# Patient Record
Sex: FEMALE | Race: WHITE | Employment: UNEMPLOYED | URBAN - NONMETROPOLITAN AREA
[De-identification: names, ages, dates, MRNs, and addresses within clinical notes are randomized per-mention and may not be internally consistent; named-entity substitution may affect disease eponyms.]

---
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
## 2021-06-18 ENCOUNTER — HOSPITAL ENCOUNTER (EMERGENCY)
Age: 21
Discharge: ANOTHER HOSPITAL | End: 2021-06-19
Attending: EMERGENCY MEDICINE
Payer: COMMERCIAL

## 2021-06-18 VITALS
RESPIRATION RATE: 18 BRPM | HEIGHT: 64 IN | HEART RATE: 78 BPM | BODY MASS INDEX: 46.95 KG/M2 | TEMPERATURE: 98.8 F | OXYGEN SATURATION: 96 % | DIASTOLIC BLOOD PRESSURE: 88 MMHG | SYSTOLIC BLOOD PRESSURE: 138 MMHG | WEIGHT: 275 LBS

## 2021-06-18 DIAGNOSIS — F32.A DEPRESSION WITH SUICIDAL IDEATION: Primary | ICD-10-CM

## 2021-06-18 DIAGNOSIS — R45.851 DEPRESSION WITH SUICIDAL IDEATION: Primary | ICD-10-CM

## 2021-06-18 LAB
-: ABNORMAL
-: NORMAL
ABSOLUTE EOS #: 0.07 K/UL (ref 0–0.44)
ABSOLUTE IMMATURE GRANULOCYTE: 0.03 K/UL (ref 0–0.3)
ABSOLUTE LYMPH #: 2.2 K/UL (ref 1.1–3.7)
ABSOLUTE MONO #: 0.76 K/UL (ref 0.1–1.2)
ACETAMINOPHEN LEVEL: <5 UG/ML (ref 10–30)
ALBUMIN SERPL-MCNC: 4.5 G/DL (ref 3.5–5.2)
ALBUMIN/GLOBULIN RATIO: 1.6 (ref 1–2.5)
ALP BLD-CCNC: 72 U/L (ref 35–104)
ALT SERPL-CCNC: 71 U/L (ref 5–33)
AMORPHOUS: ABNORMAL
AMPHETAMINE SCREEN URINE: NEGATIVE
ANION GAP SERPL CALCULATED.3IONS-SCNC: 10 MMOL/L (ref 9–17)
AST SERPL-CCNC: 35 U/L
BACTERIA: ABNORMAL
BARBITURATE SCREEN URINE: NEGATIVE
BASOPHILS # BLD: 0 % (ref 0–2)
BASOPHILS ABSOLUTE: 0.05 K/UL (ref 0–0.2)
BENZODIAZEPINE SCREEN, URINE: NEGATIVE
BILIRUB SERPL-MCNC: 0.34 MG/DL (ref 0.3–1.2)
BILIRUBIN URINE: NEGATIVE
BUN BLDV-MCNC: 9 MG/DL (ref 6–20)
BUN/CREAT BLD: 14 (ref 9–20)
BUPRENORPHINE URINE: NEGATIVE
CALCIUM SERPL-MCNC: 10.3 MG/DL (ref 8.6–10.4)
CANNABINOID SCREEN URINE: POSITIVE
CASTS UA: ABNORMAL /LPF
CHLORIDE BLD-SCNC: 103 MMOL/L (ref 98–107)
CO2: 23 MMOL/L (ref 20–31)
COCAINE METABOLITE, URINE: NEGATIVE
COLOR: YELLOW
COMMENT UA: ABNORMAL
CREAT SERPL-MCNC: 0.65 MG/DL (ref 0.5–0.9)
CRYSTALS, UA: ABNORMAL /HPF
DIFFERENTIAL TYPE: ABNORMAL
EOSINOPHILS RELATIVE PERCENT: 1 % (ref 1–4)
EPITHELIAL CELLS UA: ABNORMAL /HPF (ref 0–25)
ETHANOL PERCENT: <0.01 %
ETHANOL: <10 MG/DL
GFR AFRICAN AMERICAN: >60 ML/MIN
GFR NON-AFRICAN AMERICAN: >60 ML/MIN
GFR SERPL CREATININE-BSD FRML MDRD: ABNORMAL ML/MIN/{1.73_M2}
GFR SERPL CREATININE-BSD FRML MDRD: ABNORMAL ML/MIN/{1.73_M2}
GLUCOSE BLD-MCNC: 84 MG/DL (ref 70–99)
GLUCOSE URINE: NEGATIVE
HCG QUALITATIVE: NEGATIVE
HCT VFR BLD CALC: 45.2 % (ref 36.3–47.1)
HEMOGLOBIN: 15 G/DL (ref 11.9–15.1)
IMMATURE GRANULOCYTES: 0 %
KETONES, URINE: NEGATIVE
LEUKOCYTE ESTERASE, URINE: NEGATIVE
LYMPHOCYTES # BLD: 18 % (ref 24–43)
MCH RBC QN AUTO: 28.6 PG (ref 25.2–33.5)
MCHC RBC AUTO-ENTMCNC: 33.2 G/DL (ref 28.4–34.8)
MCV RBC AUTO: 86.3 FL (ref 82.6–102.9)
MDMA URINE: ABNORMAL
METHADONE SCREEN, URINE: NEGATIVE
METHAMPHETAMINE, URINE: NEGATIVE
MONOCYTES # BLD: 6 % (ref 3–12)
MUCUS: ABNORMAL
NITRITE, URINE: NEGATIVE
NRBC AUTOMATED: 0 PER 100 WBC
OPIATES, URINE: NEGATIVE
OTHER OBSERVATIONS UA: ABNORMAL
OXYCODONE SCREEN URINE: NEGATIVE
PDW BLD-RTO: 13 % (ref 11.8–14.4)
PH UA: 5.5 (ref 5–9)
PHENCYCLIDINE, URINE: NEGATIVE
PLATELET # BLD: 296 K/UL (ref 138–453)
PLATELET ESTIMATE: ABNORMAL
PMV BLD AUTO: 9.1 FL (ref 8.1–13.5)
POTASSIUM SERPL-SCNC: 4.2 MMOL/L (ref 3.7–5.3)
PROPOXYPHENE, URINE: NEGATIVE
PROTEIN UA: NEGATIVE
RBC # BLD: 5.24 M/UL (ref 3.95–5.11)
RBC # BLD: ABNORMAL 10*6/UL
RBC UA: ABNORMAL /HPF (ref 0–2)
REASON FOR REJECTION: NORMAL
RENAL EPITHELIAL, UA: ABNORMAL /HPF
SALICYLATE LEVEL: <1 MG/DL (ref 3–10)
SARS-COV-2, RAPID: NOT DETECTED
SEG NEUTROPHILS: 75 % (ref 36–65)
SEGMENTED NEUTROPHILS ABSOLUTE COUNT: 9.07 K/UL (ref 1.5–8.1)
SODIUM BLD-SCNC: 136 MMOL/L (ref 135–144)
SPECIFIC GRAVITY UA: 1.02 (ref 1.01–1.02)
SPECIMEN DESCRIPTION: NORMAL
TEST INFORMATION: ABNORMAL
TOTAL PROTEIN: 7.4 G/DL (ref 6.4–8.3)
TRICHOMONAS: ABNORMAL
TRICYCLIC ANTIDEPRESSANTS, UR: NEGATIVE
TURBIDITY: CLEAR
URINE HGB: NEGATIVE
UROBILINOGEN, URINE: NORMAL
WBC # BLD: 12.2 K/UL (ref 3.5–11.3)
WBC # BLD: ABNORMAL 10*3/UL
WBC UA: ABNORMAL /HPF (ref 0–5)
YEAST: ABNORMAL
ZZ NTE CLEAN UP: ORDERED TEST: NORMAL
ZZ NTE WITH NAME CLEAN UP: SPECIMEN SOURCE: NORMAL

## 2021-06-18 PROCEDURE — 87635 SARS-COV-2 COVID-19 AMP PRB: CPT

## 2021-06-18 PROCEDURE — 80053 COMPREHEN METABOLIC PANEL: CPT

## 2021-06-18 PROCEDURE — 93005 ELECTROCARDIOGRAM TRACING: CPT | Performed by: EMERGENCY MEDICINE

## 2021-06-18 PROCEDURE — 80143 DRUG ASSAY ACETAMINOPHEN: CPT

## 2021-06-18 PROCEDURE — G0480 DRUG TEST DEF 1-7 CLASSES: HCPCS

## 2021-06-18 PROCEDURE — 81001 URINALYSIS AUTO W/SCOPE: CPT

## 2021-06-18 PROCEDURE — C9803 HOPD COVID-19 SPEC COLLECT: HCPCS

## 2021-06-18 PROCEDURE — 80179 DRUG ASSAY SALICYLATE: CPT

## 2021-06-18 PROCEDURE — 84703 CHORIONIC GONADOTROPIN ASSAY: CPT

## 2021-06-18 PROCEDURE — 85025 COMPLETE CBC W/AUTO DIFF WBC: CPT

## 2021-06-18 PROCEDURE — 99284 EMERGENCY DEPT VISIT MOD MDM: CPT

## 2021-06-18 PROCEDURE — 80306 DRUG TEST PRSMV INSTRMNT: CPT

## 2021-06-18 RX ORDER — ZIPRASIDONE HYDROCHLORIDE 80 MG/1
80 CAPSULE ORAL NIGHTLY
Status: ON HOLD | COMMUNITY
End: 2021-09-17 | Stop reason: HOSPADM

## 2021-06-18 RX ORDER — PRAZOSIN HYDROCHLORIDE 1 MG/1
3 CAPSULE ORAL NIGHTLY
Status: ON HOLD | COMMUNITY
End: 2021-09-17 | Stop reason: HOSPADM

## 2021-06-18 ASSESSMENT — ENCOUNTER SYMPTOMS
ABDOMINAL PAIN: 0
COUGH: 0
SHORTNESS OF BREATH: 0
COLOR CHANGE: 0
NAUSEA: 0
SORE THROAT: 0
DIARRHEA: 0
VOMITING: 0

## 2021-06-18 ASSESSMENT — PATIENT HEALTH QUESTIONNAIRE - PHQ9: SUM OF ALL RESPONSES TO PHQ QUESTIONS 1-9: 21

## 2021-06-18 NOTE — ED NOTES
Called [facility] for transfer to [facility], waiting for call back. Trying [facility] but is discharge dependent, going to try other facilities also.       Shira Gains  06/18/21 3231

## 2021-06-18 NOTE — ED PROVIDER NOTES
[facility] ED  eMERGENCY dEPARTMENT eNCOUnter      Pt Name: Beronica Carreno  MRN: [id number]  [place] 2000  Date of evaluation: 6/18/2021  Provider: Abisai Fish DO     CHIEF COMPLAINT       Chief Complaint   Patient presents with    Suicidal     with a plan of overdosing on zoloft         HISTORY OF PRESENT ILLNESS   (Location/Symptom, Timing/Onset, Context/Setting, Quality, Duration, Modifying Factors, Severity) Note limiting factors. HPI    Beronica Carreno is a 24 y.o. female who presents to the emergency department with suicidal ideation. Patient is a 67-year-old female with past medical history of depression. She states that she has had 3 previous suicide attempts by overdose. She reports that over the past 2 weeks her depression has been increasing and her thoughts of suicide are worsened. She states that she is to the point where worsening depression has caused her to contemplate suicide and she states that she has a plan where she will overdose on her medications. She recognizes that this is not normal and is concerned that she will hurt her self if she stays at home and therefore comes to the hospital for evaluation. She denies any alcohol but does states she smokes marijuana daily    Nursing Notes were reviewed. REVIEW OF SYSTEMS    (2+ for level 4; 10+ for level 5)   Review of Systems   Constitutional: Negative for chills and fever. HENT: Negative for congestion and sore throat. Respiratory: Negative for cough and shortness of breath. Cardiovascular: Negative for chest pain. Gastrointestinal: Negative for abdominal pain, diarrhea, nausea and vomiting. Genitourinary: Negative for dysuria. Musculoskeletal: Negative for myalgias. Skin: Negative for color change and rash. Neurological: Negative for dizziness, light-headedness and headaches. Psychiatric/Behavioral: Positive for suicidal ideas. Negative for self-injury.    All other systems Insecurity:     Worried About Running Out of Food in the Last Year:     [address] in the Last Year:    Transportation Needs:     Lack of Transportation (Medical):  Lack of Transportation (Non-Medical):    Physical Activity:     Days of Exercise per Week:     Minutes of Exercise per Session:    Stress:     Feeling of Stress :    Social Connections:     Frequency of Communication with Friends and Family:     Frequency of Social Gatherings with Friends and Family:     Attends Buddhist Services:     Active Member of Clubs or Organizations:     Attends Club or Organization Meetings:     Marital Status:    Intimate Partner Violence:     Fear of Current or Ex-Partner:     Emotionally Abused:     Physically Abused:     Sexually Abused:        SCREENINGS           PHYSICAL EXAM    (up to 7 for level 4, 8 or more for level 5)   @[email or web address]    Physical Exam  Vitals and nursing note reviewed. Constitutional:       General: She is not in acute distress. Appearance: Normal appearance. She is not ill-appearing, toxic-appearing or diaphoretic. HENT:      Head: Normocephalic and atraumatic. Eyes:      General: No scleral icterus. Right eye: No discharge. Left eye: No discharge. Extraocular Movements: Extraocular movements intact. Conjunctiva/sclera: Conjunctivae normal.      Pupils: Pupils are equal, round, and reactive to light. Cardiovascular:      Rate and Rhythm: Normal rate and regular rhythm. Pulses: Normal pulses. Heart sounds: Normal heart sounds. No murmur heard. No friction rub. No gallop. Pulmonary:      Effort: Pulmonary effort is normal. No respiratory distress. Breath sounds: Normal breath sounds. No wheezing, rhonchi or rales. Musculoskeletal:         General: No swelling, tenderness, deformity or signs of injury. Normal range of motion. Cervical back: Normal range of motion and neck supple. No rigidity or tenderness.    Skin: General: Skin is warm and dry. Capillary Refill: Capillary refill takes less than 2 seconds. Findings: No erythema or rash. Neurological:      General: No focal deficit present. Mental Status: She is alert and oriented to person, place, and time. Cranial Nerves: No cranial nerve deficit. Sensory: No sensory deficit. Psychiatric:      Comments: Depressed affect with suicidal ideation         DIAGNOSTIC RESULTS     EKG (Per Emergency Physician):   EKG shows normal sinus rhythm at a rate of 70 with nonspecific ST segment changes but no acute cranial injury or dysrhythmia change. QTC is 414 and NC interval is normal at 140    RADIOLOGY (Per Emergency Physician): Interpretation per the Radiologist below, if available at the time of this note:  No results found. ED BEDSIDE ULTRASOUND:   Performed by ED Physician - none    LABS:  Labs Reviewed   ACETAMINOPHEN LEVEL - Abnormal; Notable for the following components:       Result Value    Acetaminophen Level <5 (*)     All other components within normal limits   COMPREHENSIVE METABOLIC PANEL - Abnormal; Notable for the following components:    ALT 71 (*)     AST 35 (*)     All other components within normal limits   SALICYLATE LEVEL - Abnormal; Notable for the following components:    Salicylate Lvl <1 (*)     All other components within normal limits   URINE DRUG SCREEN - Abnormal; Notable for the following components:    Cannabinoid Scrn, Ur POSITIVE (*)     All other components within normal limits   CBC WITH AUTO DIFFERENTIAL - Abnormal; Notable for the following components:    WBC 12.2 (*)     RBC 5.24 (*)     Seg Neutrophils 75 (*)     Lymphocytes 18 (*)     Segs Absolute 9.07 (*)     All other components within normal limits   COVID-19, RAPID   ETHANOL   HCG, SERUM, QUALITATIVE   SPECIMEN REJECTION        All other labs were within normal range or not returned as of this dictation.     EMERGENCY DEPARTMENT COURSE and DIFFERENTIAL DIAGNOSIS/MDM:   Vitals:    Vitals:    06/18/21 1630   BP: 138/88   Pulse: 78   Resp: 18   Temp: 98.8 °F (37.1 °C)   TempSrc: Tympanic   SpO2: 96%   Weight: 275 lb (124.7 kg)   Height: 5' 4\" (1.626 m)       Medications - No data to display    MDM. Patient presented to the ER with stable vitals and she denied any suicide attempt at this time. However she is high risk for suicide based on her 3 previous attempts and the fact she has a plan at this time. Secondary to this patient will be pink slipped. She underwent a medical clearance exam in the ER which revealed positive marijuana consistent with her history but otherwise no clinically significant findings. Therefore at this time as patient is high risk for suicide attempt based on her plan and previous intentional overdoses she will need to be placed in the hospital for further care    Patient is medically cleared at this time for placement/transfer to a psychiatric facility      REVAL:         Fahad Carver 124 time was minutes, excluding separately reportable procedures. There was a high probability of clinically significant/life threatening deterioration in the patient's condition which required my urgent intervention. CONSULTS:  None    PROCEDURES:  Unless otherwise noted below, none     Procedures    FINAL IMPRESSION      1. Depression with suicidal ideation          DISPOSITION/PLAN   DISPOSITION Decision To Transfer 06/18/2021 05:42:57 PM      PATIENT REFERRED TO:  No follow-up provider specified. DISCHARGE MEDICATIONS:  New Prescriptions    No medications on file          (Please note:  Portions of this note were completed with a voice recognition program.  Efforts were made to edit the dictations but occasionally words and phrases are mis-transcribed.)  Form v2016. J.[id number]    Marcos Nicholas DO (electronically signed)  Emergency Medicine Provider        DO Shannan  06/18/21 Lisbeth Spicer,   06/18/21 168

## 2021-06-19 LAB
EKG ATRIAL RATE: 75 BPM
EKG P AXIS: 24 DEGREES
EKG P-R INTERVAL: 140 MS
EKG Q-T INTERVAL: 384 MS
EKG QRS DURATION: 90 MS
EKG QTC CALCULATION (BAZETT): 414 MS
EKG R AXIS: 29 DEGREES
EKG T AXIS: 20 DEGREES
EKG VENTRICULAR RATE: 70 BPM

## 2021-06-19 PROCEDURE — 93010 ELECTROCARDIOGRAM REPORT: CPT | Performed by: FAMILY MEDICINE

## 2021-07-24 ENCOUNTER — HOSPITAL ENCOUNTER (EMERGENCY)
Age: 21
Discharge: HOME | End: 2021-07-25
Attending: EMERGENCY MEDICINE
Payer: COMMERCIAL

## 2021-07-24 DIAGNOSIS — F41.1 ANXIETY STATE: Primary | ICD-10-CM

## 2021-07-24 LAB
-: ABNORMAL
ABSOLUTE EOS #: 0.18 K/UL (ref 0–0.44)
ABSOLUTE IMMATURE GRANULOCYTE: 0.06 K/UL (ref 0–0.3)
ABSOLUTE LYMPH #: 2.41 K/UL (ref 1.1–3.7)
ABSOLUTE MONO #: 0.83 K/UL (ref 0.1–1.2)
ACETAMINOPHEN LEVEL: <5 UG/ML (ref 10–30)
ALBUMIN SERPL-MCNC: 4.4 G/DL (ref 3.5–5.2)
ALBUMIN/GLOBULIN RATIO: 1.3 (ref 1–2.5)
ALP BLD-CCNC: 82 U/L (ref 35–104)
ALT SERPL-CCNC: 40 U/L (ref 5–33)
AMORPHOUS: ABNORMAL
AMPHETAMINE SCREEN URINE: NEGATIVE
ANION GAP SERPL CALCULATED.3IONS-SCNC: 13 MMOL/L (ref 9–17)
AST SERPL-CCNC: 26 U/L
BACTERIA: ABNORMAL
BARBITURATE SCREEN URINE: NEGATIVE
BASOPHILS # BLD: 1 % (ref 0–2)
BASOPHILS ABSOLUTE: 0.07 K/UL (ref 0–0.2)
BENZODIAZEPINE SCREEN, URINE: NEGATIVE
BILIRUB SERPL-MCNC: 0.39 MG/DL (ref 0.3–1.2)
BILIRUBIN URINE: NEGATIVE
BUN BLDV-MCNC: 10 MG/DL (ref 6–20)
BUN/CREAT BLD: 12 (ref 9–20)
BUPRENORPHINE URINE: NEGATIVE
CALCIUM SERPL-MCNC: 9.9 MG/DL (ref 8.6–10.4)
CANNABINOID SCREEN URINE: POSITIVE
CASTS UA: ABNORMAL /LPF
CHLORIDE BLD-SCNC: 99 MMOL/L (ref 98–107)
CO2: 25 MMOL/L (ref 20–31)
COCAINE METABOLITE, URINE: NEGATIVE
COLOR: YELLOW
COMMENT UA: NORMAL
CREAT SERPL-MCNC: 0.81 MG/DL (ref 0.5–0.9)
CRYSTALS, UA: ABNORMAL /HPF
DIFFERENTIAL TYPE: ABNORMAL
EOSINOPHILS RELATIVE PERCENT: 1 % (ref 1–4)
EPITHELIAL CELLS UA: ABNORMAL /HPF (ref 0–25)
ETHANOL PERCENT: <0.01 %
ETHANOL: <10 MG/DL
GFR AFRICAN AMERICAN: >60 ML/MIN
GFR NON-AFRICAN AMERICAN: >60 ML/MIN
GFR SERPL CREATININE-BSD FRML MDRD: ABNORMAL ML/MIN/{1.73_M2}
GFR SERPL CREATININE-BSD FRML MDRD: ABNORMAL ML/MIN/{1.73_M2}
GLUCOSE BLD-MCNC: 86 MG/DL (ref 70–99)
GLUCOSE URINE: NEGATIVE
HCG QUALITATIVE: NEGATIVE
HCT VFR BLD CALC: 45.1 % (ref 36.3–47.1)
HEMOGLOBIN: 15 G/DL (ref 11.9–15.1)
IMMATURE GRANULOCYTES: 1 %
KETONES, URINE: NEGATIVE
LEUKOCYTE ESTERASE, URINE: NEGATIVE
LYMPHOCYTES # BLD: 18 % (ref 24–43)
MCH RBC QN AUTO: 28.4 PG (ref 25.2–33.5)
MCHC RBC AUTO-ENTMCNC: 33.3 G/DL (ref 28.4–34.8)
MCV RBC AUTO: 85.4 FL (ref 82.6–102.9)
MDMA URINE: ABNORMAL
METHADONE SCREEN, URINE: NEGATIVE
METHAMPHETAMINE, URINE: NEGATIVE
MONOCYTES # BLD: 6 % (ref 3–12)
MUCUS: ABNORMAL
NITRITE, URINE: NEGATIVE
NRBC AUTOMATED: 0 PER 100 WBC
OPIATES, URINE: NEGATIVE
OTHER OBSERVATIONS UA: ABNORMAL
OXYCODONE SCREEN URINE: NEGATIVE
PDW BLD-RTO: 13 % (ref 11.8–14.4)
PH UA: 8.5 (ref 5–9)
PHENCYCLIDINE, URINE: NEGATIVE
PLATELET # BLD: 301 K/UL (ref 138–453)
PLATELET ESTIMATE: ABNORMAL
PMV BLD AUTO: 8.3 FL (ref 8.1–13.5)
POTASSIUM SERPL-SCNC: 3.7 MMOL/L (ref 3.7–5.3)
PROPOXYPHENE, URINE: NEGATIVE
PROTEIN UA: NEGATIVE
RBC # BLD: 5.28 M/UL (ref 3.95–5.11)
RBC # BLD: ABNORMAL 10*6/UL
RBC UA: ABNORMAL /HPF (ref 0–2)
RENAL EPITHELIAL, UA: ABNORMAL /HPF
SALICYLATE LEVEL: <1 MG/DL (ref 3–10)
SARS-COV-2, RAPID: NOT DETECTED
SEG NEUTROPHILS: 73 % (ref 36–65)
SEGMENTED NEUTROPHILS ABSOLUTE COUNT: 9.71 K/UL (ref 1.5–8.1)
SODIUM BLD-SCNC: 137 MMOL/L (ref 135–144)
SPECIFIC GRAVITY UA: 1.01 (ref 1.01–1.02)
SPECIMEN DESCRIPTION: NORMAL
TEST INFORMATION: ABNORMAL
TOTAL PROTEIN: 7.8 G/DL (ref 6.4–8.3)
TRICHOMONAS: ABNORMAL
TRICYCLIC ANTIDEPRESSANTS, UR: NEGATIVE
TURBIDITY: CLEAR
URINE HGB: NEGATIVE
UROBILINOGEN, URINE: NORMAL
WBC # BLD: 13.3 K/UL (ref 3.5–11.3)
WBC # BLD: ABNORMAL 10*3/UL
WBC UA: ABNORMAL /HPF (ref 0–5)
YEAST: ABNORMAL

## 2021-07-24 PROCEDURE — 80143 DRUG ASSAY ACETAMINOPHEN: CPT

## 2021-07-24 PROCEDURE — C9803 HOPD COVID-19 SPEC COLLECT: HCPCS

## 2021-07-24 PROCEDURE — 6370000000 HC RX 637 (ALT 250 FOR IP): Performed by: EMERGENCY MEDICINE

## 2021-07-24 PROCEDURE — 87635 SARS-COV-2 COVID-19 AMP PRB: CPT

## 2021-07-24 PROCEDURE — G0480 DRUG TEST DEF 1-7 CLASSES: HCPCS

## 2021-07-24 PROCEDURE — 80179 DRUG ASSAY SALICYLATE: CPT

## 2021-07-24 PROCEDURE — 80053 COMPREHEN METABOLIC PANEL: CPT

## 2021-07-24 PROCEDURE — 81001 URINALYSIS AUTO W/SCOPE: CPT

## 2021-07-24 PROCEDURE — 80306 DRUG TEST PRSMV INSTRMNT: CPT

## 2021-07-24 PROCEDURE — 84703 CHORIONIC GONADOTROPIN ASSAY: CPT

## 2021-07-24 PROCEDURE — 85025 COMPLETE CBC W/AUTO DIFF WBC: CPT

## 2021-07-24 PROCEDURE — 99282 EMERGENCY DEPT VISIT SF MDM: CPT

## 2021-07-24 RX ORDER — ALPRAZOLAM 0.5 MG/1
0.5 TABLET ORAL ONCE
Status: COMPLETED | OUTPATIENT
Start: 2021-07-24 | End: 2021-07-24

## 2021-07-24 RX ADMIN — ALPRAZOLAM 0.5 MG: 0.5 TABLET ORAL at 21:09

## 2021-07-24 ASSESSMENT — ENCOUNTER SYMPTOMS
NAUSEA: 0
ABDOMINAL PAIN: 0
WHEEZING: 0
SHORTNESS OF BREATH: 0
COLOR CHANGE: 0
VOMITING: 0

## 2021-07-25 VITALS
RESPIRATION RATE: 20 BRPM | OXYGEN SATURATION: 100 % | TEMPERATURE: 99.1 F | HEART RATE: 75 BPM | DIASTOLIC BLOOD PRESSURE: 93 MMHG | SYSTOLIC BLOOD PRESSURE: 150 MMHG

## 2021-07-25 NOTE — ED NOTES
Called [facility] to have the counselor set up an appt with the pt, they will call Monday (7/26/2021) morning.       Compa Settler  07/25/21 6424

## 2021-07-25 NOTE — ED PROVIDER NOTES
History    Not on file   Tobacco Use    Smoking status: Never Smoker    Smokeless tobacco: Never Used   Vaping Use    Vaping Use: Every day    Substances: Nicotine   Substance and Sexual Activity    Alcohol use: Not Currently    Drug use: Yes     Frequency: 7.0 times per week     Types: Marijuana     Comment: HISTORY OF OPIATE USE; PT REPORTS OD ON ATIVAN 08/19/2020    Sexual activity: Not Currently   Other Topics Concern    Not on file   Social History Narrative    Not on file     Social Determinants of Health     Financial Resource Strain:     Difficulty of Paying Living Expenses:    Food Insecurity:     Worried About Running Out of Food in the Last Year:     Ran Out of Food in the Last Year:    Transportation Needs:     Lack of Transportation (Medical):  Lack of Transportation (Non-Medical):    Physical Activity:     Days of Exercise per Week:     Minutes of Exercise per Session:    Stress:     Feeling of Stress :    Social Connections:     Frequency of Communication with Friends and Family:     Frequency of Social Gatherings with Friends and Family:     Attends Denominational Services:     Active Member of Clubs or Organizations:     Attends Club or Organization Meetings:     Marital Status:    Intimate Partner Violence:     Fear of Current or Ex-Partner:     Emotionally Abused:     Physically Abused:     Sexually Abused:        No family history on file. Allergies:  Augmentin [amoxicillin-pot clavulanate]    Home Medications:  Prior to Admission medications    Medication Sig Start Date End Date Taking?  Authorizing Provider   prazosin (MINIPRESS) 1 MG capsule Take 1 mg by mouth nightly    Historical Provider, MD   sertraline (ZOLOFT) 50 MG tablet Take 50 mg by mouth nightly    Historical Provider, MD   ziprasidone (GEODON) 20 MG capsule Take 20 mg by mouth nightly    Historical Provider, MD   meclizine (ANTIVERT) 25 MG tablet Take 1 tablet by mouth 3 times daily as needed for Dizziness 11/9/17   Magalys Guadarrama MD   Topiramate (TOPAMAX PO) Take 25 mg by mouth 2 times daily     Historical Provider, MD   LORazepam (ATIVAN PO) Take 0.5 mg by mouth daily as needed     Historical Provider, MD   Naproxen Sodium (ALEVE PO) Take 1 tablet by mouth as needed     Historical Provider, MD   SITagliptin Phosphate (JANUVIA PO) Take 500 mg by mouth daily     Historical Provider, MD       REVIEW OF SYSTEMS    (2-9 systems for level 4, 10 or more for level 5)      Review of Systems   Constitutional: Negative for chills and fever. HENT: Negative for congestion. Respiratory: Negative for shortness of breath and wheezing. Cardiovascular: Negative for chest pain and leg swelling. Gastrointestinal: Negative for abdominal pain, nausea and vomiting. Genitourinary: Negative for dysuria. Skin: Negative for color change. Neurological: Negative for weakness and headaches. Psychiatric/Behavioral: Negative for agitation and suicidal ideas. PHYSICAL EXAM   (up to 7 for level 4, 8 or more for level 5)      INITIAL VITALS:   BP (!) 164/114   Pulse 104   Temp 99.1 °F (37.3 °C) (Tympanic)   Resp 18   SpO2 97%     Physical Exam  Vitals reviewed. Constitutional:       General: She is not in acute distress. Appearance: She is well-developed. HENT:      Head: Normocephalic and atraumatic. Eyes:      General:         Right eye: No discharge. Left eye: No discharge. Conjunctiva/sclera: Conjunctivae normal.   Cardiovascular:      Rate and Rhythm: Normal rate and regular rhythm. Heart sounds: Normal heart sounds. No murmur heard. No friction rub. No gallop. Pulmonary:      Effort: Pulmonary effort is normal. No respiratory distress. Breath sounds: Normal breath sounds. No wheezing or rales. Abdominal:      General: There is no distension. Palpations: Abdomen is soft. Tenderness: There is no abdominal tenderness. There is no guarding or rebound. Musculoskeletal:         General: No swelling or tenderness. Skin:     General: Skin is warm. Findings: No erythema. Neurological:      Mental Status: She is alert. Psychiatric:         Mood and Affect: Mood is not anxious or elated. Behavior: Behavior is not slowed. Thought Content: Thought content is not paranoid. Thought content includes homicidal ideation. Thought content does not include suicidal ideation. Thought content includes homicidal plan. Thought content does not include suicidal plan. DIFFERENTIAL  DIAGNOSIS     PLAN (LABS / IMAGING / EKG):  Orders Placed This Encounter   Procedures    COVID-19, Rapid    CBC auto differential    Comprehensive Metabolic Panel    Ethanol    Acetaminophen level    Salicylate    HCG Qualitative, Serum    Urine Drug Screen    Urinalysis Reflex to Culture    Microscopic Urinalysis    Suicide precautions       MEDICATIONS ORDERED:  Orders Placed This Encounter   Medications    ALPRAZolam (XANAX) tablet 0.5 mg       DDX: Depression versus bipolar disorder versus suicidal ideation versus homicidal ideation    DIAGNOSTIC RESULTS / EMERGENCY DEPARTMENT COURSE / MDM   :  Results for orders placed or performed during the hospital encounter of 07/24/21   COVID-19, Rapid    Specimen: Nasopharyngeal Swab   Result Value Ref Range    Specimen Description . NASOPHARYNGEAL SWAB     SARS-CoV-2, Rapid Not Detected Not Detected   CBC auto differential   Result Value Ref Range    WBC 13.3 (H) 3.5 - 11.3 k/uL    RBC 5.28 (H) 3.95 - 5.11 m/uL    Hemoglobin 15.0 11.9 - 15.1 g/dL    Hematocrit 45.1 36.3 - 47.1 %    MCV 85.4 82.6 - 102.9 fL    MCH 28.4 25.2 - 33.5 pg    MCHC 33.3 28.4 - 34.8 g/dL    RDW 13.0 11.8 - 14.4 %    Platelets 931 718 - 354 k/uL    MPV 8.3 8.1 - 13.5 fL    NRBC Automated 0.0 0.0 per 100 WBC    Differential Type NOT REPORTED     Seg Neutrophils 73 (H) 36 - 65 %    Lymphocytes 18 (L) 24 - 43 %    Monocytes 6 3 - 12 % Eosinophils % 1 1 - 4 %    Basophils 1 0 - 2 %    Immature Granulocytes 1 (H) 0 %    Segs Absolute 9.71 (H) 1.50 - 8.10 k/uL    Absolute Lymph # 2.41 1.10 - 3.70 k/uL    Absolute Mono # 0.83 0.10 - 1.20 k/uL    Absolute Eos # 0.18 0.00 - 0.44 k/uL    Basophils Absolute 0.07 0.00 - 0.20 k/uL    Absolute Immature Granulocyte 0.06 0.00 - 0.30 k/uL    WBC Morphology NOT REPORTED     RBC Morphology NOT REPORTED     Platelet Estimate NOT REPORTED    Comprehensive Metabolic Panel   Result Value Ref Range    Glucose 86 70 - 99 mg/dL    BUN 10 6 - 20 mg/dL    CREATININE 0.81 0.50 - 0.90 mg/dL    Bun/Cre Ratio 12 9 - 20    Calcium 9.9 8.6 - 10.4 mg/dL    Sodium 137 135 - 144 mmol/L    Potassium 3.7 3.7 - 5.3 mmol/L    Chloride 99 98 - 107 mmol/L    CO2 25 20 - 31 mmol/L    Anion Gap 13 9 - 17 mmol/L    Alkaline Phosphatase 82 35 - 104 U/L    ALT 40 (H) 5 - 33 U/L    AST 26 <32 U/L    Total Bilirubin 0.39 0.3 - 1.2 mg/dL    Total Protein 7.8 6.4 - 8.3 g/dL    Albumin 4.4 3.5 - 5.2 g/dL    Albumin/Globulin Ratio 1.3 1.0 - 2.5    GFR Non-African American >60 >60 mL/min    GFR African American >60 >60 mL/min    GFR Comment          GFR Staging         Ethanol   Result Value Ref Range    Ethanol <10 <10 mg/dL    Ethanol percent <0.010 <0.010 %   Acetaminophen level   Result Value Ref Range    Acetaminophen Level <5 (L) 10 - 30 ug/mL   Salicylate   Result Value Ref Range    Salicylate Lvl <1 (L) 3 - 10 mg/dL   HCG Qualitative, Serum   Result Value Ref Range    hCG Qual NEGATIVE NEGATIVE   Urine Drug Screen   Result Value Ref Range    Amphetamine Screen, Ur NEGATIVE NEGATIVE    Barbiturate Screen, Ur NEGATIVE NEGATIVE    Benzodiazepine Screen, Urine NEGATIVE NEGATIVE    Cocaine Metabolite, Urine NEGATIVE NEGATIVE    Methadone Screen, Urine NEGATIVE NEGATIVE    Opiates, Urine NEGATIVE NEGATIVE    Phencyclidine, Urine NEGATIVE NEGATIVE    Propoxyphene, Urine NEGATIVE NEGATIVE    Cannabinoid Scrn, Ur POSITIVE (A) NEGATIVE    Oxycodone Screen, Ur NEGATIVE NEGATIVE    Methamphetamine, Urine NEGATIVE NEGATIVE    Tricyclic Antidepressants, Urine NEGATIVE NEGATIVE    MDMA, Urine NOT REPORTED NEGATIVE    Buprenorphine Urine NEGATIVE NEGATIVE    Test Information NOT REPORTED    Urinalysis Reflex to Culture    Specimen: Urine, clean catch   Result Value Ref Range    Color, UA YELLOW YELLOW    Turbidity UA CLEAR CLEAR    Glucose, Ur NEGATIVE NEGATIVE    Bilirubin Urine NEGATIVE NEGATIVE    Ketones, Urine NEGATIVE NEGATIVE    Specific Gravity, UA 1.015 1.010 - 1.020    Urine Hgb NEGATIVE NEGATIVE    pH, UA 8.5 5.0 - 9.0    Protein, UA NEGATIVE NEGATIVE    Urobilinogen, Urine Normal Normal    Nitrite, Urine NEGATIVE NEGATIVE    Leukocyte Esterase, Urine NEGATIVE NEGATIVE    Urinalysis Comments NOT REPORTED    Microscopic Urinalysis   Result Value Ref Range    -          WBC, UA 0 TO 2 0 - 5 /HPF    RBC, UA 0 TO 2 0 - 2 /HPF    Casts UA NOT REPORTED /LPF    Crystals, UA NOT REPORTED None /HPF    Epithelial Cells UA 2 TO 5 0 - 25 /HPF    Renal Epithelial, UA NOT REPORTED 0 /HPF    Bacteria, UA TRACE (A) None    Mucus, UA TRACE (A) None    Trichomonas, UA NOT REPORTED None    Amorphous, UA NOT REPORTED None    Other Observations UA NOT REPORTED NOT REQ. Yeast, UA NOT REPORTED None       IMPRESSION: 74-year-old female comes in with homicidal ideation. Screening blood work was performed, watch precautions were started, patient pink slipped, involuntary hold because of homicidal ideation with plan strangling father. Likely transfer to psychiatric facility once medically cleared. RADIOLOGY:    No results found.       EKG  None    All EKG's are interpreted by the Emergency Department Physician who either signs or Co-signs this chart in the absence of a cardiologist.    EMERGENCY DEPARTMENT COURSE:    Patient work-up was unremarkable, Covid was negative as well, at this time patient is medically cleared, pending transfer to psychiatric facility. PROCEDURES:  None    CONSULTS:  None    CRITICAL CARE:  None    FINAL IMPRESSION      Homicidal Ideation. DISPOSITION / PLAN     DISPOSITION        PATIENT REFERRED TO:  No follow-up provider specified.     DISCHARGE MEDICATIONS:  New Prescriptions    No medications on file       Roro Bellamy MD  Emergency Medicine Attending    (Please note that portions of thisnote were completed with a voice recognition program.  Efforts were made to edit the dictations but occasionally words are mis-transcribed.)       Roro Bellamy MD  07/24/21 0572

## 2021-07-25 NOTE — ED NOTES
Called [facility] for transfer to [facility], waiting for call back.       Valeria Herrmann  07/24/21 9165

## 2021-09-14 ENCOUNTER — HOSPITAL ENCOUNTER (EMERGENCY)
Age: 21
Discharge: ANOTHER HOSPITAL | End: 2021-09-14
Attending: FAMILY MEDICINE
Payer: COMMERCIAL

## 2021-09-14 VITALS
OXYGEN SATURATION: 97 % | BODY MASS INDEX: 47.72 KG/M2 | WEIGHT: 278 LBS | DIASTOLIC BLOOD PRESSURE: 94 MMHG | SYSTOLIC BLOOD PRESSURE: 146 MMHG | HEART RATE: 100 BPM | RESPIRATION RATE: 16 BRPM | TEMPERATURE: 98.2 F

## 2021-09-14 DIAGNOSIS — R45.850 HOMICIDAL IDEATION: ICD-10-CM

## 2021-09-14 DIAGNOSIS — F31.9 BIPOLAR 1 DISORDER (HCC): Primary | ICD-10-CM

## 2021-09-14 PROBLEM — F32.A DEPRESSION WITH SUICIDAL IDEATION: Status: ACTIVE | Noted: 2021-09-14

## 2021-09-14 PROBLEM — R45.851 DEPRESSION WITH SUICIDAL IDEATION: Status: ACTIVE | Noted: 2021-09-14

## 2021-09-14 LAB
-: ABNORMAL
ABSOLUTE EOS #: 0.62 K/UL (ref 0–0.44)
ABSOLUTE IMMATURE GRANULOCYTE: 0.07 K/UL (ref 0–0.3)
ABSOLUTE LYMPH #: 3.2 K/UL (ref 1.1–3.7)
ABSOLUTE MONO #: 0.89 K/UL (ref 0.1–1.2)
ACETAMINOPHEN LEVEL: <5 UG/ML (ref 10–30)
ALBUMIN SERPL-MCNC: 4.2 G/DL (ref 3.5–5.2)
ALBUMIN/GLOBULIN RATIO: 1.4 (ref 1–2.5)
ALP BLD-CCNC: 74 U/L (ref 35–104)
ALT SERPL-CCNC: 31 U/L (ref 5–33)
AMORPHOUS: ABNORMAL
AMPHETAMINE SCREEN URINE: NEGATIVE
ANION GAP SERPL CALCULATED.3IONS-SCNC: 13 MMOL/L (ref 9–17)
AST SERPL-CCNC: 19 U/L
BACTERIA: ABNORMAL
BARBITURATE SCREEN URINE: NEGATIVE
BASOPHILS # BLD: 1 % (ref 0–2)
BASOPHILS ABSOLUTE: 0.1 K/UL (ref 0–0.2)
BENZODIAZEPINE SCREEN, URINE: NEGATIVE
BILIRUB SERPL-MCNC: 0.17 MG/DL (ref 0.3–1.2)
BILIRUBIN URINE: NEGATIVE
BUN BLDV-MCNC: 6 MG/DL (ref 6–20)
BUN/CREAT BLD: 7 (ref 9–20)
BUPRENORPHINE URINE: NEGATIVE
CALCIUM SERPL-MCNC: 9.2 MG/DL (ref 8.6–10.4)
CANNABINOID SCREEN URINE: POSITIVE
CASTS UA: ABNORMAL /LPF
CHLORIDE BLD-SCNC: 104 MMOL/L (ref 98–107)
CO2: 20 MMOL/L (ref 20–31)
COCAINE METABOLITE, URINE: NEGATIVE
COLOR: YELLOW
COMMENT UA: ABNORMAL
CREAT SERPL-MCNC: 0.82 MG/DL (ref 0.5–0.9)
CRYSTALS, UA: ABNORMAL /HPF
DIFFERENTIAL TYPE: ABNORMAL
EOSINOPHILS RELATIVE PERCENT: 5 % (ref 1–4)
EPITHELIAL CELLS UA: ABNORMAL /HPF (ref 0–25)
ETHANOL PERCENT: <0.01 %
ETHANOL: <10 MG/DL
GFR AFRICAN AMERICAN: >60 ML/MIN
GFR NON-AFRICAN AMERICAN: >60 ML/MIN
GFR SERPL CREATININE-BSD FRML MDRD: ABNORMAL ML/MIN/{1.73_M2}
GFR SERPL CREATININE-BSD FRML MDRD: ABNORMAL ML/MIN/{1.73_M2}
GLUCOSE BLD-MCNC: 100 MG/DL (ref 70–99)
GLUCOSE URINE: NEGATIVE
HCG QUALITATIVE: NEGATIVE
HCT VFR BLD CALC: 43.4 % (ref 36.3–47.1)
HEMOGLOBIN: 14.6 G/DL (ref 11.9–15.1)
IMMATURE GRANULOCYTES: 1 %
KETONES, URINE: NEGATIVE
LEUKOCYTE ESTERASE, URINE: NEGATIVE
LYMPHOCYTES # BLD: 26 % (ref 24–43)
MCH RBC QN AUTO: 28.3 PG (ref 25.2–33.5)
MCHC RBC AUTO-ENTMCNC: 33.6 G/DL (ref 28.4–34.8)
MCV RBC AUTO: 84.3 FL (ref 82.6–102.9)
MDMA URINE: ABNORMAL
METHADONE SCREEN, URINE: NEGATIVE
METHAMPHETAMINE, URINE: NEGATIVE
MONOCYTES # BLD: 7 % (ref 3–12)
MUCUS: ABNORMAL
NITRITE, URINE: NEGATIVE
NRBC AUTOMATED: 0 PER 100 WBC
OPIATES, URINE: NEGATIVE
OTHER OBSERVATIONS UA: ABNORMAL
OXYCODONE SCREEN URINE: NEGATIVE
PDW BLD-RTO: 12.3 % (ref 11.8–14.4)
PH UA: 6.5 (ref 5–9)
PHENCYCLIDINE, URINE: NEGATIVE
PLATELET # BLD: 273 K/UL (ref 138–453)
PLATELET ESTIMATE: ABNORMAL
PMV BLD AUTO: 8.7 FL (ref 8.1–13.5)
POTASSIUM SERPL-SCNC: 4 MMOL/L (ref 3.7–5.3)
PROPOXYPHENE, URINE: NEGATIVE
PROTEIN UA: NEGATIVE
RBC # BLD: 5.15 M/UL (ref 3.95–5.11)
RBC # BLD: ABNORMAL 10*6/UL
RBC UA: ABNORMAL /HPF (ref 0–2)
RENAL EPITHELIAL, UA: ABNORMAL /HPF
SALICYLATE LEVEL: <1 MG/DL (ref 3–10)
SARS-COV-2, RAPID: NOT DETECTED
SEG NEUTROPHILS: 60 % (ref 36–65)
SEGMENTED NEUTROPHILS ABSOLUTE COUNT: 7.61 K/UL (ref 1.5–8.1)
SODIUM BLD-SCNC: 137 MMOL/L (ref 135–144)
SPECIFIC GRAVITY UA: 1.02 (ref 1.01–1.02)
SPECIMEN DESCRIPTION: NORMAL
TEST INFORMATION: ABNORMAL
TOTAL PROTEIN: 7.2 G/DL (ref 6.4–8.3)
TRICHOMONAS: ABNORMAL
TRICYCLIC ANTIDEPRESSANTS, UR: NEGATIVE
TURBIDITY: CLEAR
URINE HGB: NEGATIVE
UROBILINOGEN, URINE: NORMAL
WBC # BLD: 12.5 K/UL (ref 3.5–11.3)
WBC # BLD: ABNORMAL 10*3/UL
WBC UA: ABNORMAL /HPF (ref 0–5)
YEAST: ABNORMAL

## 2021-09-14 PROCEDURE — 36415 COLL VENOUS BLD VENIPUNCTURE: CPT

## 2021-09-14 PROCEDURE — 80053 COMPREHEN METABOLIC PANEL: CPT

## 2021-09-14 PROCEDURE — 85025 COMPLETE CBC W/AUTO DIFF WBC: CPT

## 2021-09-14 PROCEDURE — 84703 CHORIONIC GONADOTROPIN ASSAY: CPT

## 2021-09-14 PROCEDURE — 81001 URINALYSIS AUTO W/SCOPE: CPT

## 2021-09-14 PROCEDURE — 6370000000 HC RX 637 (ALT 250 FOR IP): Performed by: EMERGENCY MEDICINE

## 2021-09-14 PROCEDURE — 80143 DRUG ASSAY ACETAMINOPHEN: CPT

## 2021-09-14 PROCEDURE — G0480 DRUG TEST DEF 1-7 CLASSES: HCPCS

## 2021-09-14 PROCEDURE — 87635 SARS-COV-2 COVID-19 AMP PRB: CPT

## 2021-09-14 PROCEDURE — 99285 EMERGENCY DEPT VISIT HI MDM: CPT

## 2021-09-14 PROCEDURE — 80179 DRUG ASSAY SALICYLATE: CPT

## 2021-09-14 PROCEDURE — 93005 ELECTROCARDIOGRAM TRACING: CPT | Performed by: FAMILY MEDICINE

## 2021-09-14 PROCEDURE — 80306 DRUG TEST PRSMV INSTRMNT: CPT

## 2021-09-14 RX ORDER — HYDROXYZINE HYDROCHLORIDE 25 MG/1
25 TABLET, FILM COATED ORAL 2 TIMES DAILY
Status: ON HOLD | COMMUNITY
End: 2021-09-17 | Stop reason: HOSPADM

## 2021-09-14 RX ORDER — HYDROXYZINE PAMOATE 50 MG/1
50 CAPSULE ORAL 3 TIMES DAILY PRN
Status: DISCONTINUED | OUTPATIENT
Start: 2021-09-14 | End: 2021-09-15 | Stop reason: HOSPADM

## 2021-09-14 RX ORDER — TRAZODONE HYDROCHLORIDE 50 MG/1
50 TABLET ORAL NIGHTLY
Status: ON HOLD | COMMUNITY
End: 2021-09-17 | Stop reason: HOSPADM

## 2021-09-14 RX ADMIN — HYDROXYZINE PAMOATE 50 MG: 50 CAPSULE ORAL at 22:12

## 2021-09-14 ASSESSMENT — ENCOUNTER SYMPTOMS
EYES NEGATIVE: 1
RESPIRATORY NEGATIVE: 1
GASTROINTESTINAL NEGATIVE: 1

## 2021-09-14 NOTE — ED PROVIDER NOTES
[facility] ED  EMERGENCY DEPARTMENT ENCOUNTER      Pt Name: Rafael Marques  MRN: [id number]  [place] 2000  Date of evaluation: 9/14/2021  Provider: Dick Lo MD    CHIEF COMPLAINT     Chief Complaint   Patient presents with    Homicidal     pt states she is on a \"donw slope\" of her bipolar, pt states \"I want to kill anyone who pisses me off\"         HISTORY OF PRESENT ILLNESS   (Location/Symptom, Timing/Onset, Context/Setting,Quality, Duration, Modifying Factors, Severity)  Note limiting factors. Rafael Marques is a17 y.o. female who presents to the emergency department      Patient is a 57-year-old with a history of bipolar presented ER complaining of decompensated bipolar. She reports that she knows she is in a downslope of her bipolar and she is generally irritable and she feels that she could really hurt somebody if they would irritate her. She denies having a particular person in mind or a plan to hurt somebody in particular. Nursing Notes werereviewed. REVIEW OF SYSTEMS    (2-9 systems for level 4, 10 or more for level 5)     Review of Systems   Constitutional: Negative. HENT: Negative. Eyes: Negative. Respiratory: Negative. Cardiovascular: Negative. Gastrointestinal: Negative. Endocrine: Negative. Genitourinary: Negative. Musculoskeletal: Negative. Skin: Negative. Neurological: Negative. Hematological: Negative. Psychiatric/Behavioral: Positive for behavioral problems and dysphoric mood. Homicidal ideas no specific plan. Except as noted above the remainder of the review of systems was reviewed and negative.        PAST MEDICAL HISTORY     Past Medical History:   Diagnosis Date    Anxiety     At risk for abuse of opiates     Borderline [facility] [address]     Depression     Diabetes mellitus ([facility] [address] [facility]     Migraines          SURGICALHISTORY       Past Surgical History:   Procedure Laterality Transportation (Non-Medical):    Physical Activity:     Days of Exercise per Week:     Minutes of Exercise per Session:    Stress:     Feeling of Stress :    Social Connections:     Frequency of Communication with Friends and Family:     Frequency of Social Gatherings with Friends and Family:     Attends [employer]ism Services:     Active Member of Clubs or Organizations:     Attends Club or Organization Meetings:     Marital Status:    Intimate Partner Violence:     Fear of Current or Ex-Partner:     Emotionally Abused:     Physically Abused:     Sexually Abused:        SCREENINGS                    PHYSICAL EXAM    (up to 7 for level 4, 8 or more for level 5)     ED Triage Vitals [09/14/21 1451]   BP Temp Temp Source Pulse Resp SpO2 Height Weight   (!) 161/104 98.2 °F (36.8 °C) Tympanic 129 16 97 % -- 278 lb (126.1 kg)       Physical Exam  Vitals and nursing note reviewed. Constitutional:       General: She is not in acute distress. Appearance: Normal appearance. She is obese. She is not ill-appearing, toxic-appearing or diaphoretic. HENT:      Head: Normocephalic. Nose: Nose normal.      Mouth/Throat:      Mouth: Mucous membranes are moist.   Cardiovascular:      Rate and Rhythm: Normal rate and regular rhythm. Pulses: Normal pulses. Heart sounds: Normal heart sounds. Pulmonary:      Effort: Pulmonary effort is normal.      Breath sounds: Normal breath sounds. Abdominal:      General: Abdomen is flat. Musculoskeletal:         General: No swelling, tenderness, deformity or signs of injury. Normal range of motion. Cervical back: Normal range of motion. Right lower leg: No edema. Skin:     General: Skin is warm. Capillary Refill: Capillary refill takes less than 2 seconds. Neurological:      General: No focal deficit present. Mental Status: She is alert. Mental status is at baseline.    Psychiatric:      Comments: Depression with homicidal ideation/irritability. Patient tells me she feels that she is very irritable and she is afraid she will hurt somebody. DIAGNOSTIC RESULTS     EKG: All EKG's are interpreted by the Emergency Department Physician who either signs orCo-signs this chart in the absence of a cardiologist.        RADIOLOGY:   plain film images such as CT, Ultrasound and MRI are read by the radiologist. Plain radiographic images are visualized and preliminarily interpreted by the emergency physician with the below findings:        Interpretation per the Radiologist below, ifavailable at the time of this note:    No orders to display         ED BEDSIDE ULTRASOUND:   Performed by ED Physician - none    LABS:  Labs Reviewed   ACETAMINOPHEN LEVEL - Abnormal; Notable for the following components:       Result Value    Acetaminophen Level <5 (*)     All other components within normal limits   CBC WITH AUTO DIFFERENTIAL - Abnormal; Notable for the following components:    WBC 12.5 (*)     RBC 5.15 (*)     Eosinophils % 5 (*)     Immature Granulocytes 1 (*)     Absolute Eos # 0.62 (*)     All other components within normal limits   COMPREHENSIVE METABOLIC PANEL - Abnormal; Notable for the following components:    Glucose 100 (*)     Bun/Cre Ratio 7 (*)     Total Bilirubin 0.17 (*)     All other components within normal limits   SALICYLATE LEVEL - Abnormal; Notable for the following components:    Salicylate Lvl <1 (*)     All other components within normal limits   URINE DRUG SCREEN - Abnormal; Notable for the following components:    Cannabinoid Scrn, Ur POSITIVE (*)     All other components within normal limits   URINALYSIS WITH MICROSCOPIC - Abnormal; Notable for the following components:    Bacteria, UA 1+ (*)     Mucus, UA 1+ (*)     All other components within normal limits   COVID-19, RAPID   ETHANOL   HCG, SERUM, QUALITATIVE       All other labs were within normal range ornot returned as of this dictation.     EMERGENCY DEPARTMENT COURSE and DIFFERENTIAL DIAGNOSIS/MDM:   Vitals:    Vitals:    09/14/21 1451   BP: (!) 161/104   Pulse: 129   Resp: 16   Temp: 98.2 °F (36.8 °C)   TempSrc: Tympanic   SpO2: 97%   Weight: 278 lb (126.1 kg)           MDM  Number of Diagnoses or Management Options  Bipolar 1 disorder (HCC)  Homicidal ideation  Diagnosis management comments: Patient history of bipolar disorder presents to the ER complaining of suicidal ideation and irritability. She tells me that she is decompensating she really needs to be inpatient at this time. She request to be admitted at a specific facility: [facility]. Medically cleared. CRITICAL CARE TIME   Total CriticalCare time was  minutes, excluding separately reportable procedures. There was a high probability of clinically significant/life threatening deterioration in the patient's condition which required my urgent intervention. CONSULTS:  None    PROCEDURES:  Unlessotherwise noted below, none     Procedures    FINAL IMPRESSION      1. Bipolar 1 disorder ([facility] [address]    2. Homicidal ideation          DISPOSITION/PLAN   DISPOSITION        PATIENT REFERRED TO:  No follow-up provider specified.     DISCHARGE MEDICATIONS:  New Prescriptions    No medications on file              (Please note that portions of this note were completed with a voice recognition program.  Efforts were made to edit the dictations but occasionally words are mis-transcribed.)      Maddy Pate MD (electronically signed)  Attending Emergency Physician            Maddy Pate MD  09/14/21 1267 Flora Couch MD  09/16/21 0412

## 2021-09-15 ENCOUNTER — HOSPITAL ENCOUNTER (INPATIENT)
Age: 21
LOS: 3 days | Discharge: HOME | DRG: 751 | End: 2021-09-18
Attending: PSYCHIATRY & NEUROLOGY | Admitting: PSYCHIATRY & NEUROLOGY
Payer: COMMERCIAL

## 2021-09-15 PROBLEM — F43.10 PTSD (POST-TRAUMATIC STRESS DISORDER): Status: ACTIVE | Noted: 2021-09-15

## 2021-09-15 PROBLEM — F12.10 MARIJUANA ABUSE: Status: ACTIVE | Noted: 2021-09-15

## 2021-09-15 PROBLEM — F31.64 BIPOLAR AFFECTIVE DISORDER, MIXED, SEVERE, WITH PSYCHOTIC BEHAVIOR (HCC): Status: ACTIVE | Noted: 2021-09-15

## 2021-09-15 PROBLEM — F33.3 SEVERE RECURRENT MAJOR DEPRESSION WITH PSYCHOTIC FEATURES (HCC): Status: ACTIVE | Noted: 2021-09-15

## 2021-09-15 LAB
EKG ATRIAL RATE: 113 BPM
EKG P AXIS: 42 DEGREES
EKG P-R INTERVAL: 136 MS
EKG Q-T INTERVAL: 346 MS
EKG QRS DURATION: 88 MS
EKG QTC CALCULATION (BAZETT): 459 MS
EKG R AXIS: 62 DEGREES
EKG T AXIS: 8 DEGREES
EKG VENTRICULAR RATE: 106 BPM

## 2021-09-15 PROCEDURE — 93010 ELECTROCARDIOGRAM REPORT: CPT | Performed by: INTERNAL MEDICINE

## 2021-09-15 PROCEDURE — 90792 PSYCH DIAG EVAL W/MED SRVCS: CPT | Performed by: PSYCHIATRY & NEUROLOGY

## 2021-09-15 PROCEDURE — 6370000000 HC RX 637 (ALT 250 FOR IP): Performed by: NURSE PRACTITIONER

## 2021-09-15 PROCEDURE — 1240000000 HC EMOTIONAL WELLNESS R&B

## 2021-09-15 RX ORDER — POLYETHYLENE GLYCOL 3350 17 G/17G
17 POWDER, FOR SOLUTION ORAL DAILY PRN
Status: DISCONTINUED | OUTPATIENT
Start: 2021-09-15 | End: 2021-09-18 | Stop reason: HOSPADM

## 2021-09-15 RX ORDER — IBUPROFEN 400 MG/1
400 TABLET ORAL EVERY 6 HOURS PRN
Status: DISCONTINUED | OUTPATIENT
Start: 2021-09-15 | End: 2021-09-18 | Stop reason: HOSPADM

## 2021-09-15 RX ORDER — ZIPRASIDONE HYDROCHLORIDE 40 MG/1
40 CAPSULE ORAL 2 TIMES DAILY WITH MEALS
Status: DISCONTINUED | OUTPATIENT
Start: 2021-09-15 | End: 2021-09-17

## 2021-09-15 RX ORDER — MECLIZINE HYDROCHLORIDE 25 MG/1
25 TABLET ORAL 3 TIMES DAILY PRN
Status: DISCONTINUED | OUTPATIENT
Start: 2021-09-15 | End: 2021-09-18 | Stop reason: HOSPADM

## 2021-09-15 RX ORDER — ONDANSETRON 4 MG/1
4 TABLET, FILM COATED ORAL
Status: ON HOLD | COMMUNITY
End: 2021-09-17 | Stop reason: HOSPADM

## 2021-09-15 RX ORDER — ACETAMINOPHEN 325 MG/1
650 TABLET ORAL EVERY 4 HOURS PRN
Status: DISCONTINUED | OUTPATIENT
Start: 2021-09-15 | End: 2021-09-18 | Stop reason: HOSPADM

## 2021-09-15 RX ORDER — MAGNESIUM HYDROXIDE/ALUMINUM HYDROXICE/SIMETHICONE 120; 1200; 1200 MG/30ML; MG/30ML; MG/30ML
30 SUSPENSION ORAL EVERY 6 HOURS PRN
Status: DISCONTINUED | OUTPATIENT
Start: 2021-09-15 | End: 2021-09-18 | Stop reason: HOSPADM

## 2021-09-15 RX ORDER — PRAZOSIN HYDROCHLORIDE 1 MG/1
3 CAPSULE ORAL NIGHTLY
Status: DISCONTINUED | OUTPATIENT
Start: 2021-09-15 | End: 2021-09-18 | Stop reason: HOSPADM

## 2021-09-15 RX ORDER — TRAZODONE HYDROCHLORIDE 50 MG/1
50 TABLET ORAL NIGHTLY PRN
Status: DISCONTINUED | OUTPATIENT
Start: 2021-09-15 | End: 2021-09-18 | Stop reason: HOSPADM

## 2021-09-15 RX ORDER — HYDROXYZINE 50 MG/1
50 TABLET, FILM COATED ORAL 3 TIMES DAILY PRN
Status: DISCONTINUED | OUTPATIENT
Start: 2021-09-15 | End: 2021-09-18 | Stop reason: HOSPADM

## 2021-09-15 RX ORDER — METFORMIN HYDROCHLORIDE 500 MG/1
500 TABLET, EXTENDED RELEASE ORAL
Status: ON HOLD | COMMUNITY
End: 2021-09-17 | Stop reason: HOSPADM

## 2021-09-15 RX ADMIN — TRAZODONE HYDROCHLORIDE 50 MG: 50 TABLET ORAL at 21:14

## 2021-09-15 RX ADMIN — NICOTINE POLACRILEX 2 MG: 2 GUM, CHEWING BUCCAL at 20:00

## 2021-09-15 RX ADMIN — PRAZOSIN HYDROCHLORIDE 3 MG: 1 CAPSULE ORAL at 21:14

## 2021-09-15 RX ADMIN — HYDROXYZINE HYDROCHLORIDE 50 MG: 50 TABLET ORAL at 21:14

## 2021-09-15 ASSESSMENT — SLEEP AND FATIGUE QUESTIONNAIRES
DO YOU USE A SLEEP AID: YES
DO YOU HAVE DIFFICULTY SLEEPING: YES
DIFFICULTY ARISING: NO
DIFFICULTY STAYING ASLEEP: YES
SLEEP PATTERN: DIFFICULTY FALLING ASLEEP;DISTURBED/INTERRUPTED SLEEP;RESTLESSNESS;INSOMNIA
AVERAGE NUMBER OF SLEEP HOURS: 2
RESTFUL SLEEP: NO
DIFFICULTY FALLING ASLEEP: YES

## 2021-09-15 ASSESSMENT — LIFESTYLE VARIABLES: HISTORY_ALCOHOL_USE: NO

## 2021-09-15 ASSESSMENT — PAIN SCALES - GENERAL: PAINLEVEL_OUTOF10: 0

## 2021-09-15 NOTE — ED PROVIDER NOTES
79-year-old patient initially evaluated per emergency department attending Dr. Maik Rojas please refer to his documentation concerning presentation laboratory studies    At change of shift and upon my interviewing the patient she relates that she is no longer homicidal however she is suicidal with a plan of taking pills.   She has had suicide attempts in the past with attempts at overdosing    Consultation undertaken with [facility] representative patient accepted under Dr. Melida Hernandez service    Diagnosis major depression with suicidal ideations     Zach Julian MD  09/14/21 4269

## 2021-09-15 NOTE — PROGRESS NOTES
Pharmacy Medication History Note      List of current medications patient is taking is complete. Source of information: [address] [place], [place]),[place]    Changes made to medication list:  Medications removed (include reason, ex. therapy complete or physician discontinued, noncompliance):  Lorazepam (therapy completed), Meclizine (therapy completed), Naproxen (list clean up), Sitagliptin (list clean up), Topiramate (list clean up)    Medications added/doses adjusted:  Adjusted Hydroxyzine to 25 mg twice daily  Added Prazosin to 3 mg nightly  Adjusted Sertraline to 75 mg daily  Adjusted Ziprasidone to 80 mg nightly  Added Metformin  mg daily  Added Ondansetron 4 mg every 4 to 6 hours as needed      Please let me know if you have any questions about this encounter. Thank you!     Electronically signed by Hai Skinner, [address] [place] [address] on 9/15/2021 at 9:11 AM

## 2021-09-15 NOTE — PROGRESS NOTES
Comprehensive Nutrition Assessment    Type and Reason for Visit:  Initial, Positive Nutrition Screen (wt loss, poor appetite)    Nutrition Recommendations/Plan:   Will continue to provide Regular diet and add Ensure High Protein supplements to trays once daily. Nutrition Assessment:  Pt was admitted due to homocidal/suicidal thoughts. Current wt is stated. Review of wt history shows wt has been stable since 6/21. Pt is now advanced to Regular diet. Malnutrition Assessment:  Malnutrition Status: At risk for malnutrition (Comment)    Context:  Acute Illness     Findings of the 6 clinical characteristics of malnutrition:  Energy Intake:  Unable to assess  Weight Loss:  Unable to assess (stated wts)     Body Fat Loss:  Unable to assess     Muscle Mass Loss:  Unable to assess    Fluid Accumulation:  No significant fluid accumulation     Strength:  Not Performed    Estimated Daily Nutrient Needs:  Energy (kcal):  15 kcal/kg= 1500 kcal; Weight Used for Energy Requirements:  Current     Protein (g):  1.3g/kg= 70 g; Weight Used for Protein Requirements:  Ideal          Nutrition Related Findings:  no edema, labs/meds: Reviewed, PMH: Polysubstance abuse      Wounds:  None       Current Nutrition Therapies:    ADULT DIET; Regular  Adult Oral Nutrition Supplement;  Low Calorie/High Protein Oral Supplement    Anthropometric Measures:  · Height: 5' 4\" (162.6 cm)  · Current Body Weight: 278 lb (126.1 kg)   · Admission Body Weight: 278 lb (126.1 kg)    · Usual Body Weight: 275 lb (124.7 kg)     · Ideal Body Weight: 120 lbs; BMI: 47.7  · BMI Categories: Obese Class 3 (BMI 40.0 or greater)       Nutrition Diagnosis:   · Inadequate oral intake related to psychological cause or life stress (current condition) as evidenced by poor intake prior to admission, weight loss    Nutrition Interventions:   Food and/or Nutrient Delivery:  Continue Current Diet, Start Oral Nutrition Supplement  Nutrition Education/Counseling:  No recommendation at this time   Coordination of Nutrition Care:  Continue to monitor while inpatient    Goals:  po intake greater than 50%       Nutrition Monitoring and Evaluation:   Food/Nutrient Intake Outcomes:  Food and Nutrient Intake, Supplement Intake  Physical Signs/Symptoms Outcomes:  Biochemical Data, GI Status, Skin, Weight, Fluid Status or Edema     Discharge Planning:    Continue current diet     Electronically signed by Deandre Cruz RD, LD on 9/15/21 at 2:31 PM EDT    Contact: [phone]

## 2021-09-15 NOTE — BH NOTE
Patient was called to desk for scheduled Geodon at 1700. Patient refused stating, \"I take that at night with my Trazadone. Please tell me you guys have that right, because they've had my meds wrong all day. I told them how I take my meds, and I want them given like that, I don't want my medications messed with. \" Writer and another staff member explained to patient that meds could only be given as written by the physician. Geodon was refused at this time.

## 2021-09-15 NOTE — ED NOTES
Called [facility] for I transfer. Pt has agreed to try [facility] as we have not recvd a response from [facility].   Waiting for [facility] to call back     Kulwinder Burr  09/14/21 2128
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
## 2021-09-15 NOTE — BH NOTE
Patient given tobacco quitline number 77842657738 at this time, refusing to call at this time, states \" I just dont want to quit now\"- patient given information as to the dangers of long term tobacco use. Continue to reinforce the importance of tobacco cessation.
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
## 2021-09-15 NOTE — BH NOTE
[facility]  Admission Note     Admission Type:   Admission Type: Involuntary (signed in)    Reason for admission:  Reason for Admission: homicial ideations towards \"people messing with me. \" no specific plan or people. wants medications adjusted    PATIENT STRENGTHS:  Strengths: Medication Compliance, Social Skills, Motivated    Patient Strengths and Limitations:  Limitations: Tendency to isolate self, Difficulty problem solving/relies on others to help solve problems, Inappropriate/potentially harmful leisure interests    Addictive Behavior:   Addictive Behavior  In the past 3 months, have you felt or has someone told you that you have a problem with:  : None  Do you have a history of Chemical Use?: No  Do you have a history of Alcohol Use?: No  Do you have a history of Street Drug Abuse?: Yes  Histroy of Prescripton Drug Abuse?: No    Medical Problems:   Past Medical History:   Diagnosis Date    Anxiety     At risk for abuse of opiates     Borderline [facility] disorder ([facility] [address]     Depression     Diabetes mellitus ([facility] [address] [facility]     Migraines        Status EXAM:  Status and Exam  Normal: No  Facial Expression: Worried  Affect: Appropriate  Level of Consciousness: Alert  Mood:Normal: No  Mood: Depressed, Anxious  Motor Activity:Normal: No  Motor Activity: Agitated  Interview Behavior: Cooperative  Preception: [place] to Person, [place] to Time, [place] to Place, [place] to Situation  Attention:Normal: Yes  Thought Content:Normal: No  Thought Content: Preoccupations  Hallucinations: None  Delusions: No  Memory:Normal: Yes  Insight and Judgment: No  Insight and Judgment: Poor Judgment, Poor Insight  Present Suicidal Ideation: No  Present Homicidal Ideation: Yes (towards no one specific and no specific plans \"just anyone who messes with me\")    Tobacco Screening:  Practical Counseling, on admission, kamlesh X, if applicable and completed (first 3 are required if patient doesn't refuse):             (x ) Recognizing danger situations (included triggers and roadblocks)                    (x )  Coping skills (new ways to manage stress, exercise, relaxation techniques, changing routine, distraction)                                                           (x )  Basic information about quitting (benefits of quitting, techniques in how to quit, available resources  ( ) Referral for counseling faxed to [facility]                                           (x ) Patient refused counseling  ( ) Patient has not smoked in the last 30 days    Metabolic Screening:    No results found for: LABA1C    Lab Results   Component Value Date    CHOL 127 06/20/2021     Lab Results   Component Value Date    TRIG 100 06/20/2021     Lab Results   Component Value Date    HDL 32 (L) 06/20/2021     No components found for: LDLCAL  No results found for: LABVLDL      Body mass index is 47.72 kg/m². BP Readings from Last 2 Encounters:   09/15/21 (!) 140/116   09/14/21 (!) 146/94       Patient reports increased depression and anxiety due to her increased irritation towards people in her life \"messing with her. \" pt reports homicidal ideations towards \"anyone who messes with me. \" pt reports she feels her medications need adjusted due to her mood concerns and her poor sleep. Pt reports she is a \"binge/ purge\" bulimic and has been since middle school. Pt denies any hallucinations and suicidal ideations. Pt admitted with followings belongings:  Dentures: None  Vision - Corrective Lenses: None  Hearing Aid: None  Jewelry: Earrings, Bracelet  Body Piercings Removed: No  Clothing: Pants, Jacket / coat, Shirt, Undergarments (Comment), Footwear  Were All Patient Medications Collected?: Not Applicable  Other Valuables: Cell phone     Patient's home medications were no. Patient oriented to surroundings and program expectations and copy of patient rights given. Received admission packet:  yes. Consents reviewed, signed yes. Refused no. Patient verbalize understanding:  yes.   Patient education on precautions: yes                   Kem Yang RN

## 2021-09-15 NOTE — GROUP NOTE
Group Therapy Note    Date: 9/15/2021    Group Start Time: 1000  Group End Time: 4317  Group Topic: Psychotherapy    [facility]    SAM Spann        Group Therapy Note    Attendees: 7/14         Patient's Goal:  Expression of feeling    Notes:      Status After Intervention:  Improved    Participation Level: Interactive    Participation Quality: Sharing      Speech:  normal      Thought Process/Content: Logical      Affective Functioning: Congruent      Mood: euthymic      Level of consciousness:  Alert and Oriented x4      Response to Learning: Able to verbalize/acknowledge new learning      Endings: None Reported    Modes of Intervention: Support      Discipline Responsible: /Counselor      Signature:  SAM Spann

## 2021-09-15 NOTE — GROUP NOTE
Group Therapy Note    Date: 9/15/2021    Group Start Time: 1330  Group End Time: 1609  Group Topic: Recreational    [address], JORGE        Group Therapy Note    Attendees: 4/16         Patient's Goal:  Identify coping skills through recreation and lesiure    Status After Intervention:  Improved    Participation Level:  Active Listener and Interactive    Participation Quality: Appropriate, Attentive, Sharing and Supportive      Speech:  normal      Thought Process/Content: Logical      Affective Functioning: Congruent      Mood: euphoric      Level of consciousness:  Alert, Oriented x4 and Attentive      Response to Learning: Able to verbalize current knowledge/experience, Able to verbalize/acknowledge new learning and Able to retain information    Modes of Intervention: Education, Support, Socialization and Exploration      Discipline Responsible: Psychoeducational Specialist      Signature:  Brittanie Pitt

## 2021-09-15 NOTE — PLAN OF CARE
[facility]  Initial Interdisciplinary Treatment Plan NO      Original treatment plan Date & Time: 9/15/21    Admission Type:  Admission Type: Involuntary (signed in)    Reason for admission:   Reason for Admission: homicial ideations towards \"people messing with me. \" no specific plan or people.  wants medications adjusted    Estimated Length of Stay:  5-7days  Estimated Discharge Date: to be determined by physician    PATIENT STRENGTHS:  Patient Strengths:Strengths: Medication Compliance, Social Skills, Motivated  Patient Strengths and Limitations:Limitations: Tendency to isolate self, Difficulty problem solving/relies on others to help solve problems, Inappropriate/potentially harmful leisure interests  Addictive Behavior: Addictive Behavior  In the past 3 months, have you felt or has someone told you that you have a problem with:  : None  Do you have a history of Chemical Use?: No  Do you have a history of Alcohol Use?: No  Do you have a history of Street Drug Abuse?: Yes  Histroy of Prescripton Drug Abuse?: No  Medical Problems:  Past Medical History:   Diagnosis Date    Anxiety     At risk for abuse of opiates     Borderline personality disorder ([facility]     Depression     Diabetes mellitus ([facility]     Migraines      Status EXAM:Status and Exam  Normal: No  Facial Expression: Worried  Affect: Appropriate  Level of Consciousness: Alert  Mood:Normal: No  Mood: Depressed, Anxious  Motor Activity:Normal: No  Motor Activity: Agitated  Interview Behavior: Cooperative  Preception: [place] to Person, [place] to Time, [place] to Place, [place] to Situation  Attention:Normal: Yes  Thought Content:Normal: No  Thought Content: Preoccupations  Hallucinations: None  Delusions: No  Memory:Normal: Yes  Insight and Judgment: No  Insight and Judgment: Poor Judgment, Poor Insight  Present Suicidal Ideation: No  Present Homicidal Ideation: Yes (towards no one specific and no specific plans \"just anyone who messes with me\")    EDUCATION:   Learner Progress Toward Treatment Goals: reviewed group plans and strategies for care    Method:group therapy, medication compliance, individualized assessments and care planning    Outcome: needs reinforcement    PATIENT GOALS: to be discussed with patient within 72 hours    PLAN/TREATMENT RECOMMENDATIONS:     continue group therapy , medications compliance, goal setting, individualized assessments and care, continue to monitor pt on unit      SHORT-TERM GOALS:   Time frame for Short-Term Goals: 5-7 days    LONG-TERM GOALS:  Time frame for Long-Term Goals: 6 months  Members Present in Team Meeting: See Signature Sheet    Amilcar Melgoza, [address]

## 2021-09-15 NOTE — PROGRESS NOTES
Pharmacy Med Education Group Note    Date: 9/15/21  Start Time: 3155  End Time: 1600    Number Participants in Group:  4    Goal:  Patient will demonstrate an understanding of the medications intended purpose and possible adverse effects  Topic: [place] for Pharmacy Med Ed Group    Discipline Responsible:     OT  AT  [facility].  RT     X Other       Participation Level:     None  Minimal      X Active Listener    X Interactive    Monopolizing         Participation Quality:    X Appropriate  Inappropriate     X       Attentive        Intrusive          Sharing        Resistant          Supportive        Lethargic       Affective:     X Congruent  Incongruent  Blunted  Flat    Constricted  Anxious  Elated  Angry    Labile  Depressed  Other         Cognitive:    X Alert  Oriented PPTP     Concentration   X G  F  P   Attention Span   X G  F  P   Short-Term Memory   X G  F  P   Long-Term Memory  G  F  P   ProblemSolving/  Decision Making  G  F  P   Ability to Process  Information   X G  F  P      Contributing Factors             Delusional             Hallucinating             Flight of Ideas             Other:       Modes of Intervention:    X Education   X Support  Exploration    Clarifying  Problem Solving  Confrontation    Socialization  Limit Setting  Reality Testing    Activity  Movement  Media    Other:            Response to Learning:    X Able to verbalize current knowledge/experience    Able to verbalize/acknowledge new learning    Able to retain information    Capable of insight    Able to change behavior    Progressing to goal    Other:        Comments:     Sienna Saenz RPH,PharmD,  9/15/2021, 4:11 PM

## 2021-09-15 NOTE — PLAN OF CARE
Problem: Anger Management/Homicidal Ideation:  Goal: Absence of homicidal ideation  Description: Absence of homicidal ideation, Patient approachable ;mood sad and irritable during 1:1 talk. Patient encouraged to seek staff for 1:1 talk time. Patient denied suicidal and homicidal ideations       Problem: Risk of Harm:  Goal: Ability to remain free from injury will improve  Description: Ability to remain free from injury will improve  Outcome: Ongoing, Patient remain free from falls ;non skid slippers on.

## 2021-09-15 NOTE — ED NOTES
Called [facility] access for I transfer to [facility] (per pt request). Pt is pink slipped.  Waiting for call back from [facility]  09/14/21 2015

## 2021-09-15 NOTE — PROGRESS NOTES
Behavioral Services  Medicare Certification Upon Admission    I certify that this patient's inpatient psychiatric hospital admission is medically necessary for:    [x] (1) Treatment which could reasonably be expected to improve this patient's condition,       [x] (2) Or for diagnostic study;     AND     [x](2) The inpatient psychiatric services are provided while the individual is under the care of a physician and are included in the individualized plan of care.     Estimated length of stay/service 3 to 5 days    Plan for post-hospital care Home with outpatient community mental health follow-up and recommended dialectical behavior therapy    Electronically signed by Meghann Briceño MD on 9/15/2021 at 3:33 PM

## 2021-09-16 PROCEDURE — 1240000000 HC EMOTIONAL WELLNESS R&B

## 2021-09-16 PROCEDURE — 90833 PSYTX W PT W E/M 30 MIN: CPT | Performed by: PSYCHIATRY & NEUROLOGY

## 2021-09-16 PROCEDURE — 6370000000 HC RX 637 (ALT 250 FOR IP): Performed by: NURSE PRACTITIONER

## 2021-09-16 PROCEDURE — 6370000000 HC RX 637 (ALT 250 FOR IP): Performed by: PSYCHIATRY & NEUROLOGY

## 2021-09-16 PROCEDURE — APPSS15 APP SPLIT SHARED TIME 0-15 MINUTES: Performed by: NURSE PRACTITIONER

## 2021-09-16 PROCEDURE — 99232 SBSQ HOSP IP/OBS MODERATE 35: CPT | Performed by: PSYCHIATRY & NEUROLOGY

## 2021-09-16 RX ORDER — HYDROXYZINE HYDROCHLORIDE 10 MG/1
10 TABLET, FILM COATED ORAL 2 TIMES DAILY
Status: DISCONTINUED | OUTPATIENT
Start: 2021-09-16 | End: 2021-09-18 | Stop reason: HOSPADM

## 2021-09-16 RX ORDER — NICOTINE 21 MG/24HR
1 PATCH, TRANSDERMAL 24 HOURS TRANSDERMAL DAILY
Status: DISCONTINUED | OUTPATIENT
Start: 2021-09-16 | End: 2021-09-18 | Stop reason: HOSPADM

## 2021-09-16 RX ADMIN — ZIPRASIDONE HYDROCHLORIDE 40 MG: 40 CAPSULE ORAL at 18:34

## 2021-09-16 RX ADMIN — HYDROXYZINE HYDROCHLORIDE 50 MG: 50 TABLET ORAL at 21:03

## 2021-09-16 RX ADMIN — PRAZOSIN HYDROCHLORIDE 3 MG: 1 CAPSULE ORAL at 21:03

## 2021-09-16 RX ADMIN — TRAZODONE HYDROCHLORIDE 50 MG: 50 TABLET ORAL at 21:03

## 2021-09-16 RX ADMIN — ZIPRASIDONE HYDROCHLORIDE 40 MG: 40 CAPSULE ORAL at 08:25

## 2021-09-16 ASSESSMENT — ENCOUNTER SYMPTOMS
EYES NEGATIVE: 1
GASTROINTESTINAL NEGATIVE: 1
RESPIRATORY NEGATIVE: 1

## 2021-09-16 NOTE — PROGRESS NOTES
Daily Progress Note  9/16/2021    Patient Name: Pascual Acosta:  Suicidal/homicidal ideation          SUBJECTIVE:      Patient is seen today for a follow up assessment. Anastasiya Vaughan reports that she is feeling better and \"no longer in crisis\", she has been attending group activities and remains social among the milieu. She did refuse to take her geodon yesterday because it was given earlier than what she takes it at home. She also refused her evening dose of zoloft. She reports today that she is \"willing to meet in the middle\" and agrees to take her geodon with dinner today. She was educated that she needs to take it with food. She reports that she is having increased anxiety regarding interactions with another peer who she reports \"wants to be my sugar daddy\". We discussed the importance of notifying staff when she feels uncomfortable, she then downplayed the interaction requesting that this author tell no one else. She endorses improvement in suicidal ideation, continues to contract for safety. We discussed therapy, she named off multiple providers in the [place] area that she reports as part of her \"team\" and feels that she has appropriate support already established.     Appetite:  [x] Adequate/Unchanged  [] Increased  [] Decreased      Sleep:       [x] Adequate/Unchanged  [] Fair  [] Poor      Group Attendance on Unit:   [x] Yes  [] Selectively    [] No    Medication Side Effects: denies         Mental Status Exam  Level of consciousness: Alert and awake   Appearance: Appropriate attire for setting, seated in chair, with fair  grooming and hygiene   Behavior/Motor: Approachable, no psychomotor abnormalities   Attitude toward examiner: Cooperative, attentive, good eye contact   Speech: Normal rate volume and tone  Mood: \"Better\"  Affect: Congruent, somewhat superficial and minimizing  Thought processes: Linear and coherent  Thought content: Endorses improvement in homicidal ideation  Suicidal Ideation: Reports improvement in suicidal ideations, contracts for safety on the unit. Delusions: No evidence of delusions. Perceptual Disturbance: Denies, patient does not appear to be responding to internal stimuli. Cognition: Oriented to self, location, time, and situation  Memory: intact  Insight: poor   Judgement: poor       Data   height is 5' 4\" (1.626 m) and weight is 278 lb (126.1 kg). Her oral temperature is 98 °F (36.7 °C). Her blood pressure is 145/98 (abnormal) and her pulse is 99. Her respiration is 14 and oxygen saturation is 100%. Labs:   No visits with results within 2 Day(s) from this visit.    Latest known visit with results is:   Admission on 09/14/2021, Discharged on 09/14/2021   Component Date Value Ref Range Status    Acetaminophen Level 09/14/2021 <5* 10 - 30 ug/mL Final    WBC 09/14/2021 12.5* 3.5 - 11.3 k/uL Final    RBC 09/14/2021 5.15* 3.95 - 5.11 m/uL Final    Hemoglobin 09/14/2021 14.6  11.9 - 15.1 g/dL Final    Hematocrit 09/14/2021 43.4  36.3 - 47.1 % Final    MCV 09/14/2021 84.3  82.6 - 102.9 fL Final    MCH 09/14/2021 28.3  25.2 - 33.5 pg Final    MCHC 09/14/2021 33.6  28.4 - 34.8 g/dL Final    RDW 09/14/2021 12.3  11.8 - 14.4 % Final    Platelets 71/13/8568 273  138 - 453 k/uL Final    MPV 09/14/2021 8.7  8.1 - 13.5 fL Final    NRBC Automated 09/14/2021 0.0  0.0 per 100 WBC Final    Differential Type 09/14/2021 NOT REPORTED   Final    Seg Neutrophils 09/14/2021 60  36 - 65 % Final    Lymphocytes 09/14/2021 26  24 - 43 % Final    Monocytes 09/14/2021 7  3 - 12 % Final    Eosinophils % 09/14/2021 5* 1 - 4 % Final    Basophils 09/14/2021 1  0 - 2 % Final    Immature Granulocytes 09/14/2021 1* 0 % Final    Segs Absolute 09/14/2021 7.61  1.50 - 8.10 k/uL Final    Absolute Lymph # 09/14/2021 3.20  1.10 - 3.70 k/uL Final    Absolute Mono # 09/14/2021 0.89  0.10 - 1.20 k/uL Final    Absolute Eos # 09/14/2021 0.62* 0.00 - 0.44 k/uL Final    Basophils Absolute 09/14/2021 0.10  0.00 - 0.20 k/uL Final    Absolute Immature Granulocyte 09/14/2021 0.07  0.00 - 0.30 k/uL Final    WBC Morphology 09/14/2021 NOT REPORTED   Final    RBC Morphology 09/14/2021 NOT REPORTED   Final    Platelet Estimate 42/84/1136 NOT REPORTED   Final    Glucose 09/14/2021 100* 70 - 99 mg/dL Final    BUN 09/14/2021 6  6 - 20 mg/dL Final    CREATININE 09/14/2021 0.82  0.50 - 0.90 mg/dL Final    Bun/Cre Ratio 09/14/2021 7* 9 - 20 Final    Calcium 09/14/2021 9.2  8.6 - 10.4 mg/dL Final    Sodium 09/14/2021 137  135 - 144 mmol/L Final    Potassium 09/14/2021 4.0  3.7 - 5.3 mmol/L Final    Chloride 09/14/2021 104  98 - 107 mmol/L Final    CO2 09/14/2021 20  20 - 31 mmol/L Final    Anion Gap 09/14/2021 13  9 - 17 mmol/L Final    Alkaline Phosphatase 09/14/2021 74  35 - 104 U/L Final    ALT 09/14/2021 31  5 - 33 U/L Final    AST 09/14/2021 19  <32 U/L Final    Total Bilirubin 09/14/2021 0.17* 0.3 - 1.2 mg/dL Final    Total Protein 09/14/2021 7.2  6.4 - 8.3 g/dL Final    Albumin 09/14/2021 4.2  3.5 - 5.2 g/dL Final    Albumin/Globulin Ratio 09/14/2021 1.4  1.0 - 2.5 Final    GFR Non- 09/14/2021 >60  >60 mL/min Final    GFR  09/14/2021 >60  >60 mL/min Final    GFR Comment 09/14/2021        Final    Comment: Average GFR for 20-28 years old:   80 mL/min/1.73sq m  Chronic Kidney Disease:   <60 mL/min/1.73sq m  Kidney failure:   <15 mL/min/1.73sq m              eGFR calculated using average adult body mass.  Additional eGFR calculator available at:        [employer]            GFR Staging 09/14/2021        Final    Comment: Stage 1: Some kidney damage normal GFR  Stage 2: Mild kidney damage GFR 60-89   Stage 3: Moderate kidney damage GFR 30-59  Stage 4: Severe kidney damage GFR 15-29  Stage 5: Severe kidney damage GFR <15  ESRD - chronic treatment by dialysis or transplant            Ethanol 09/14/2021 <10  <10 mg/dL Final    Ethanol percent 09/14/2021 <0.010  <0.010 % Final    hCG Qual 09/14/2021 NEGATIVE  NEGATIVE Final    Comment: Specimens with hCG levels near the threshold of the test (25 mIU/mL) may give a negative or   indeterminate result. In such cases, another test should be performed with a new specimen   in 48-72 hours. If early pregnancy is suspected clinically in this setting, correlation   with quantitative serum b-hCG level is suggested. [employer] has confirmed the use of plasma for this test. This has not been cleared   or approved by the U.S. Food and Drug Administration. The FDA has determined that such   clearance is not necessary.  Salicylate Lvl 47/75/6327 <1* 3 - 10 mg/dL Final    Amphetamine Screen, Ur 09/14/2021 NEGATIVE  NEGATIVE Final    Barbiturate Screen, Ur 09/14/2021 NEGATIVE  NEGATIVE Final    Benzodiazepine Screen, Urine 09/14/2021 NEGATIVE  NEGATIVE Final    Cocaine Metabolite, Urine 09/14/2021 NEGATIVE  NEGATIVE Final    Methadone Screen, Urine 09/14/2021 NEGATIVE  NEGATIVE Final    Opiates, Urine 09/14/2021 NEGATIVE  NEGATIVE Final    Phencyclidine, Urine 09/14/2021 NEGATIVE  NEGATIVE Final    Propoxyphene, Urine 09/14/2021 NEGATIVE  NEGATIVE Final    Cannabinoid Scrn, Ur 09/14/2021 POSITIVE* NEGATIVE Final    Oxycodone Screen, Ur 09/14/2021 NEGATIVE  NEGATIVE Final    Methamphetamine, Urine 09/14/2021 NEGATIVE  NEGATIVE Final    Tricyclic Antidepressants, Urine 09/14/2021 NEGATIVE  NEGATIVE Final    Comment: Drug screen results are to be used for medical purposes only. All positive results are   unconfirmed. Testing for employment or legal uses should be sent to a reference laboratory   for confirmation.       MDMA, Urine 09/14/2021 NOT REPORTED  NEGATIVE Final    Buprenorphine Urine 09/14/2021 NEGATIVE  NEGATIVE Final    Test Information 09/14/2021 NOT REPORTED   Final    Ventricular Rate 09/14/2021 106  BPM Final    Atrial Rate Normal  Normal Final    Nitrite, Urine 09/14/2021 NEGATIVE  NEGATIVE Final    Leukocyte Esterase, Urine 09/14/2021 NEGATIVE  NEGATIVE Final    Urinalysis Comments 09/14/2021 NOT REPORTED   Final    - 09/14/2021        Final    WBC, UA 09/14/2021 0 TO 2  0 - 5 /HPF Final    RBC, UA 09/14/2021 0 TO 2  0 - 2 /HPF Final    Casts UA 09/14/2021 NOT REPORTED  /LPF Final    Crystals, UA 09/14/2021 NOT REPORTED  None /HPF Final    Epithelial Cells UA 09/14/2021 5 TO 10  0 - 25 /HPF Final    Renal Epithelial, UA 09/14/2021 NOT REPORTED  0 /HPF Final    Bacteria, UA 09/14/2021 1+* None Final    Mucus, UA 09/14/2021 1+* None Final    Trichomonas, UA 09/14/2021 NOT REPORTED  None Final    Amorphous, UA 09/14/2021 NOT REPORTED  None Final    Other Observations UA 09/14/2021 NOT REPORTED  NOT REQ. Final    Yeast, UA 09/14/2021 NOT REPORTED  None Final         Reviewed patient's current plan of care and vital signs with nursing staff.     Labs reviewed: [x] Yes    Medications  Current Facility-Administered Medications: nicotine (NICODERM CQ) 14 MG/24HR 1 patch, 1 patch, TransDERmal, Daily  meclizine (ANTIVERT) tablet 25 mg, 25 mg, Oral, TID PRN  prazosin (MINIPRESS) capsule 3 mg, 3 mg, Oral, Nightly  acetaminophen (TYLENOL) tablet 650 mg, 650 mg, Oral, Q4H PRN  aluminum & magnesium hydroxide-simethicone (MAALOX) 200-200-20 MG/5ML suspension 30 mL, 30 mL, Oral, Q6H PRN  hydrOXYzine (ATARAX) tablet 50 mg, 50 mg, Oral, TID PRN  ibuprofen (ADVIL;MOTRIN) tablet 400 mg, 400 mg, Oral, Q6H PRN  traZODone (DESYREL) tablet 50 mg, 50 mg, Oral, Nightly PRN  polyethylene glycol (GLYCOLAX) packet 17 g, 17 g, Oral, Daily PRN  ziprasidone (GEODON) capsule 40 mg, 40 mg, Oral, BID WC  sertraline (ZOLOFT) tablet 75 mg, 75 mg, Oral, Nightly    ASSESSMENT  Severe recurrent major depression with psychotic features ([facility]         PLAN  Patient symptoms are: Slowly improving  Continue current medication regimen, encourage compliance with oral medication regimen this evening  Monitor need and frequency of PRN medications. Encourage participation in groups and milieu. Attempt to develop insight. Psycho-education conducted. Supportive Therapy conducted. Probable discharge is per attending physician, pending medication compliance and stability of symptoms  Follow-up daily while inpatient. Patient continues to be monitored in the inpatient psychiatric facility at [facility] for safety and stabilization. Patient continues to need, on a daily basis, active treatment furnished directly by or requiring the supervision of inpatient psychiatric personnel. Electronically signed by INOCENCIO Fields CNP on 9/16/2021 at 12:59 PM    **This report has been created using voice recognition software. It may contain minor errors which are inherent in voice recognition technology. **    I independently saw and evaluated the patient. I reviewed the nurse practitioners documentation above. Any additional comments or changes to the nurse practitioners documentation are stated below otherwise agree with assessment. Plan will be as follows:  Spent 30 minutes with the patient, of that greater than 16 minutes was spent in supportive psychotherapy. Patient is denying side effects to medication. We discussed risk benefits and alternatives and she is in agreement to take her Geodon and suppertime. Previously she states she took it at nighttime before bed and would be in the habit of eating before bed. We discussed potential benefit of splitting dosing to a.m. and dinnertime doses so that she could resume a more normal eating habit. Patient is reporting improvement in her suicidal ideations. Requesting Atarax p.o. twice daily  PLAN  Patient s symptoms   are improving  Schedule Atarax 10 mg p.o. twice daily  Attempt to develop insight  Psycho-education conducted. Supportive Therapy conducted.   Probable discharge is likely Saturday  Follow-up daily while on inpatient unit

## 2021-09-16 NOTE — GROUP NOTE
HS Goal Group          Date: September 15, 2021     Group Start Time: 2000     Group End Time: 2040     ARVIN PACHECOI      Attendees: 7/16          Group Topic: HS Goal Group     Notes: Patient participated in HS goal group. Status After Intervention: Improved     Participation Level:  Active Listener and Interactive     Participation Quality: Appropriate, attentive and supportive     Speech: Normal     Thought Process/Content: Logical and linear     Affective Functioning: Congruent     Mood: WDL     Level of consciousness: Oriented x4     Response to Learning: Progressing to goal; able to verbalize current knowledge/experience, acknowledge new learning, retain information and change behavior Endings: None reported     Modes of Intervention: Education and exploration          Discipline Responsible: Behavioral Health Tech     Signature: DONALDO Lindo

## 2021-09-16 NOTE — PLAN OF CARE
[address] [place] [address]  Day 3 Interdisciplinary Treatment Plan NOTE    Review Date & Time: 9/16/21    Admission Type:   Admission Type: Involuntary (signed in)    Reason for admission:  Reason for Admission: homicial ideations towards \"people messing with me. \" no specific plan or people.  wants medications adjusted  Estimated Length of Stay: 5-7 days  Estimated Discharge Date Update: to be determined by physician    PATIENT STRENGTHS:  Patient Strengths Strengths: Medication Compliance, Social Skills, Motivated  Patient Strengths and Limitations:Limitations: Unrealistic self-view  Addictive Behavior:Addictive Behavior  In the past 3 months, have you felt or has someone told you that you have a problem with:  : None  Do you have a history of Chemical Use?: No  Do you have a history of Alcohol Use?: No  Do you have a history of Street Drug Abuse?: Yes  Histroy of Prescripton Drug Abuse?: No  Medical Problems:  Past Medical History:   Diagnosis Date    Anxiety     At risk for abuse of opiates     Borderline personality disorder ([facility]     Depression     Diabetes mellitus ([facility]     Migraines        Risk:  Fall RiskTotal: 65  Ukrt Scale Kurt Scale Score: 23  BVC Total: 0  Change in scores no Changes to plan of Care no    Status EXAM:   Status and Exam  Normal: No  Facial Expression: Avoids Gaze, Worried  Affect: Appropriate  Level of Consciousness: Alert  Mood:Normal: No  Mood: Anxious, Empty  Motor Activity:Normal: No  Motor Activity: Agitated  Interview Behavior: Cooperative  Preception: [place] to Person, [place] to Time, [place] to Place, [place] to Situation  Attention:Normal: No  Attention: Unable to Concentrate  Thought Processes: Circumstantial  Thought Content:Normal: No  Thought Content: Preoccupations  Hallucinations: None  Delusions: No  Memory:Normal: Yes  Insight and Judgment: No  Insight and Judgment: Poor Insight, Poor Judgment  Present Suicidal Ideation: No  Present Homicidal Ideation: No    Daily

## 2021-09-16 NOTE — GROUP NOTE
Group Therapy Note    Date: 9/16/2021    Group Start Time: 1000  Group End Time: 9422  Group Topic: Psychotherapy    ARVIN BHI C    SAM Tian        Group Therapy Note    Attendees: 3/17         Patient's Goal:  Expression of feeling     Notes:      Status After Intervention:  Improved    Participation Level:  Active Listener and Interactive    Participation Quality: Appropriate and Attentive      Speech:  normal      Thought Process/Content: Logical      Affective Functioning: Congruent      Mood: euthymic      Level of consciousness:  Alert and Oriented x4      Response to Learning: Able to verbalize current knowledge/experience and Able to verbalize/acknowledge new learning      Endings: None Reported    Modes of Intervention: Education and Support      Discipline Responsible: /Counselor      Signature:  SAM Tian

## 2021-09-16 NOTE — GROUP NOTE
Group Therapy Note    Date: 9/16/2021    Group Start Time: 1100  Group End Time: 1200  Group Topic: Cognitive Skills    JORGE Steele        Group Therapy Note    Attendees:4/15         Patient's Goal:  Ability to express self appropriately, improving self esteem. Status After Intervention:  Improved    Participation Level:  Active Listener and Interactive    Participation Quality: Appropriate, Attentive, Sharing and Supportive      Speech:  normal      Thought Process/Content: Logical      Affective Functioning: Congruent      Mood: euphoric      Level of consciousness:  Alert, Oriented x4 and Attentive      Response to Learning: Able to verbalize current knowledge/experience, Able to verbalize/acknowledge new learning and Able to retain information    Modes of Intervention: Education, Support, Socialization and Exploration      Discipline Responsible: Psychoeducational Specialist      Signature:  John Bonds

## 2021-09-16 NOTE — GROUP NOTE
Group Therapy Note    Date: 9/16/2021    Group Start Time: 3341  Group End Time: 1430  Group Topic: Recreational    ARVIN Petty, CTRS        Group Therapy Note    Attendees:7/15         Patient's Goal:  To identify coping skills through recreation and leisure        Status After Intervention:  Improved    Participation Level:  Active Listener and Interactive    Participation Quality: Appropriate, Attentive, Sharing and Supportive      Speech:  normal      Thought Process/Content: Logical      Affective Functioning: Congruent      Mood: euphoric      Level of consciousness:  Alert, Oriented x4 and Attentive      Response to Learning: Able to verbalize current knowledge/experience, Able to verbalize/acknowledge new learning and Able to retain information      Modes of Intervention: Education, Support, Socialization, Exploration and Clarifying      Discipline Responsible: Psychoeducational Specialist      Signature:  Robbie Douglas

## 2021-09-16 NOTE — PLAN OF CARE
Patient was able to verbalize anger and frustration appropriately. Patient remains free from homicidal thoughts.

## 2021-09-17 PROCEDURE — 90833 PSYTX W PT W E/M 30 MIN: CPT | Performed by: PSYCHIATRY & NEUROLOGY

## 2021-09-17 PROCEDURE — 6370000000 HC RX 637 (ALT 250 FOR IP): Performed by: NURSE PRACTITIONER

## 2021-09-17 PROCEDURE — 1240000000 HC EMOTIONAL WELLNESS R&B

## 2021-09-17 PROCEDURE — 99232 SBSQ HOSP IP/OBS MODERATE 35: CPT | Performed by: PSYCHIATRY & NEUROLOGY

## 2021-09-17 PROCEDURE — 6370000000 HC RX 637 (ALT 250 FOR IP): Performed by: PSYCHIATRY & NEUROLOGY

## 2021-09-17 RX ORDER — ESCITALOPRAM OXALATE 10 MG/1
5 TABLET ORAL DAILY
Status: DISCONTINUED | OUTPATIENT
Start: 2021-09-18 | End: 2021-09-18 | Stop reason: HOSPADM

## 2021-09-17 RX ORDER — ZIPRASIDONE HYDROCHLORIDE 80 MG/1
80 CAPSULE ORAL
Qty: 30 CAPSULE | Refills: 0 | Status: SHIPPED | OUTPATIENT
Start: 2021-09-18

## 2021-09-17 RX ORDER — SERTRALINE HYDROCHLORIDE 25 MG/1
75 TABLET, FILM COATED ORAL NIGHTLY
Qty: 90 TABLET | Refills: 0 | Status: CANCELLED | OUTPATIENT
Start: 2021-09-17

## 2021-09-17 RX ORDER — TRAZODONE HYDROCHLORIDE 50 MG/1
50 TABLET ORAL NIGHTLY PRN
Qty: 30 TABLET | Refills: 0 | Status: SHIPPED | OUTPATIENT
Start: 2021-09-17

## 2021-09-17 RX ORDER — ESCITALOPRAM OXALATE 5 MG/1
5 TABLET ORAL DAILY
Qty: 30 TABLET | Refills: 0 | Status: SHIPPED | OUTPATIENT
Start: 2021-09-18

## 2021-09-17 RX ORDER — NICOTINE 21 MG/24HR
1 PATCH, TRANSDERMAL 24 HOURS TRANSDERMAL DAILY
Qty: 30 PATCH | Refills: 0 | Status: CANCELLED | OUTPATIENT
Start: 2021-09-18

## 2021-09-17 RX ORDER — HYDROXYZINE HYDROCHLORIDE 10 MG/1
10 TABLET, FILM COATED ORAL 2 TIMES DAILY
Qty: 60 TABLET | Refills: 0 | Status: SHIPPED | OUTPATIENT
Start: 2021-09-17 | End: 2021-09-27

## 2021-09-17 RX ORDER — IBUPROFEN 400 MG/1
400 TABLET ORAL EVERY 6 HOURS PRN
Qty: 120 TABLET | Refills: 0 | Status: SHIPPED | OUTPATIENT
Start: 2021-09-17

## 2021-09-17 RX ORDER — ZIPRASIDONE HYDROCHLORIDE 80 MG/1
80 CAPSULE ORAL
Status: DISCONTINUED | OUTPATIENT
Start: 2021-09-18 | End: 2021-09-18 | Stop reason: HOSPADM

## 2021-09-17 RX ORDER — ZIPRASIDONE HYDROCHLORIDE 40 MG/1
40 CAPSULE ORAL 2 TIMES DAILY WITH MEALS
Qty: 60 CAPSULE | Refills: 0 | Status: CANCELLED | OUTPATIENT
Start: 2021-09-17

## 2021-09-17 RX ORDER — PRAZOSIN HYDROCHLORIDE 1 MG/1
3 CAPSULE ORAL NIGHTLY
Qty: 90 CAPSULE | Refills: 0 | Status: SHIPPED | OUTPATIENT
Start: 2021-09-17

## 2021-09-17 RX ADMIN — HYDROXYZINE HYDROCHLORIDE 50 MG: 50 TABLET ORAL at 11:03

## 2021-09-17 RX ADMIN — ZIPRASIDONE HYDROCHLORIDE 40 MG: 40 CAPSULE ORAL at 08:38

## 2021-09-17 RX ADMIN — HYDROXYZINE HYDROCHLORIDE 10 MG: 10 TABLET, FILM COATED ORAL at 08:38

## 2021-09-17 RX ADMIN — HYDROXYZINE HYDROCHLORIDE 50 MG: 50 TABLET ORAL at 21:05

## 2021-09-17 RX ADMIN — TRAZODONE HYDROCHLORIDE 50 MG: 50 TABLET ORAL at 21:02

## 2021-09-17 RX ADMIN — HYDROXYZINE HYDROCHLORIDE 10 MG: 10 TABLET, FILM COATED ORAL at 21:01

## 2021-09-17 RX ADMIN — PRAZOSIN HYDROCHLORIDE 3 MG: 1 CAPSULE ORAL at 21:02

## 2021-09-17 NOTE — GROUP NOTE
Group Therapy Note    Date: 9/17/2021    Group Start Time: 0915  Group End Time: 0930  Group Topic: Community Meeting    ARVIN CAREY TED Sr RN        Group Therapy Note    Attendees: 03/19         Patient's Goal:  Daily goal setting      Status After Intervention:  Improved    Participation Level: Interactive    Participation Quality: Appropriate      Speech:  normal      Thought Process/Content: Logical      Affective Functioning: Congruent      Mood: anxious      Level of consciousness:  Alert and Oriented x4      Response to Learning: Able to verbalize current knowledge/experience      Endings: None Reported    Modes of Intervention: Support      Discipline Responsible: Registered Nurse      Signature:  Cesar Sr RN

## 2021-09-17 NOTE — GROUP NOTE
Group Therapy Note    Date: 9/17/2021    Group Start Time: 2478  Group End Time: 0142  Group Topic: Recreational    [address], CTRS        Group Therapy Note    Attendees:3/16    patient refused to attend priorities  group at [id number] after encouragement from staff. 1:1 talk time provided as alternative to group session.          Signature:  Baron Martinez, [address]

## 2021-09-17 NOTE — PROGRESS NOTES
CLINICAL PHARMACY NOTE: MEDS TO BEDS    Total # of Prescriptions Filled: 3   The following medications were delivered to the patient:  · Ibuprofen 400mg  · Escitalopram 5mg  · Hydroxyzine HCL 10mg      Additional Documentation:  Delivered medications to nurses station

## 2021-09-17 NOTE — GROUP NOTE
Group Therapy Note    Date: 9/17/2021    Group Start Time: 1000  Group End Time: 1687  Group Topic: Psychotherapy    ARVIN BHI SAM Shoemaker        Group Therapy Note    Attendees: 6/19         Patient's Goal:  Expression of feeling     Notes:      Status After Intervention:  Improved    Participation Level:  Active Listener and Interactive    Participation Quality: Appropriate and Attentive      Speech:  normal      Thought Process/Content: Logical      Affective Functioning: Congruent      Mood: euthymic      Level of consciousness:  Alert and Oriented x4      Response to Learning: Able to verbalize current knowledge/experience and Able to verbalize/acknowledge new learning      Endings: None Reported    Modes of Intervention: Education and Support      Discipline Responsible: /Counselor      Signature:  SAM Guillen

## 2021-09-17 NOTE — GROUP NOTE
Group Therapy Note    Date: 9/17/2021    Group Start Time: 1430  Group End Time: 1500  Group Topic: Healthy Living/Wellness    ARVIN Malone        Group Therapy Note    Attendees: 6         Patient's Goal: Socialization    Notes:  Pt attended group and participated. Status After Intervention:  Improved    Participation Level:  Active Listener    Participation Quality: Appropriate      Speech:  normal      Thought Process/Content: Logical      Affective Functioning: Flat      Mood: euthymic      Level of consciousness:  Alert and Oriented x4      Response to Learning: Able to verbalize current knowledge/experience      Endings: None Reported    Modes of Intervention: Socialization      Discipline Responsible: Behavorial Health Tech      Signature:  Deborah Chairez

## 2021-09-17 NOTE — GROUP NOTE
Group Therapy Note    Date: 9/17/2021    Group Start Time: 1100  Group End Time: 2699  Group Topic: Recreational    [address], CTRS        Group Therapy Note    Attendees: 5/18         Patient's Goal:  To identify positive coping skills through recreation and leisure    Status After Intervention:  Improved    Participation Level:  Active Listener and Interactive    Participation Quality: Appropriate, Attentive, Sharing and Supportive      Speech:  normal      Thought Process/Content: Logical      Affective Functioning: Congruent      Mood: euphoric      Level of consciousness:  Alert, Oriented x4 and Attentive      Response to Learning: Able to verbalize current knowledge/experience, Able to verbalize/acknowledge new learning and Able to retain information    Modes of Intervention: Education, Support, Socialization, Exploration and Clarifying      Discipline Responsible: Psychoeducational Specialist      Signature:  Steff Richardson

## 2021-09-18 VITALS
BODY MASS INDEX: 47.46 KG/M2 | HEIGHT: 64 IN | HEART RATE: 104 BPM | TEMPERATURE: 97.5 F | RESPIRATION RATE: 14 BRPM | DIASTOLIC BLOOD PRESSURE: 97 MMHG | WEIGHT: 278 LBS | OXYGEN SATURATION: 100 % | SYSTOLIC BLOOD PRESSURE: 137 MMHG

## 2021-09-18 PROCEDURE — 99239 HOSP IP/OBS DSCHRG MGMT >30: CPT | Performed by: PSYCHIATRY & NEUROLOGY

## 2021-09-18 PROCEDURE — 6370000000 HC RX 637 (ALT 250 FOR IP): Performed by: PSYCHIATRY & NEUROLOGY

## 2021-09-18 RX ADMIN — HYDROXYZINE HYDROCHLORIDE 10 MG: 10 TABLET, FILM COATED ORAL at 08:44

## 2021-09-18 RX ADMIN — ESCITALOPRAM OXALATE 5 MG: 10 TABLET ORAL at 08:43

## 2021-09-18 ASSESSMENT — PAIN SCALES - GENERAL: PAINLEVEL_OUTOF10: 0

## 2021-09-18 NOTE — BH NOTE
Patient given tobacco quitline number [id number] at this time, refusing to call at this time, states \" I just dont want to quit now\"- patient given information as to the dangers of long term tobacco use. Continue to reinforce the importance of tobacco cessation.

## 2021-09-18 NOTE — PLAN OF CARE
Problem: Falls - Risk of:  Goal: Will remain free from falls  Description: Will remain free from falls  Outcome: Ongoing  Note: Patient remains free from falls at this time. Problem: Falls - Risk of:  Goal: Absence of physical injury  Description: Absence of physical injury  Outcome: Ongoing  Note: Patient remains free from injuries at this time. Problem: Anger Management/Homicidal Ideation:  Goal: Ability to verbalize frustrations and anger appropriately will improve  Description: Ability to verbalize frustrations and anger appropriately will improve  9/17/2021 2223 by Karrie Shah LPN  Outcome: Ongoing  Note: Patient verbalized no frustrations and/or anger at this time. Problem: Anger Management/Homicidal Ideation:  Goal: Absence of homicidal ideation  Description: Absence of homicidal ideation  9/17/2021 2223 by Karrie Shah LPN  Outcome: Ongoing  Note: Patient denies homicidal ideations at this time. Problem: Risk of Harm:  Goal: Ability to remain free from injury will improve  Description: Ability to remain free from injury will improve  Outcome: Ongoing  Note: Patient remains free from injuries at this time     Problem: Tobacco Use:  Goal: Inpatient tobacco use cessation counseling participation  Description: Inpatient tobacco use cessation counseling participation  Outcome: Ongoing  Note: Patient participated in inpatient tobacco use cessation counseling at this time.
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
## 2021-09-18 NOTE — BH NOTE
[facility]  Discharge Note    Pt discharged with followings belongings:   Dentures: None  Vision - Corrective Lenses: None  Hearing Aid: None  Jewelry: Earrings, Bracelet  Body Piercings Removed: No  Clothing: Pants, Jacket / coat, Shirt, Undergarments (Comment), Footwear  Were All Patient Medications Collected?: Not Applicable  Other Valuables: Cell phone   Valuables sent home with patient or returned to patient. Patient education on aftercare instructions: completed  Information faxed to [facility] by nurse  at 10:27 AM .Patient verbalize understanding of AVS:  yes. Status EXAM upon discharge:  Status and Exam  Normal: Yes  Facial Expression: Brightened  Affect: Appropriate  Level of Consciousness: Alert  Mood:Normal: No  Mood: Anxious  Motor Activity:Normal: Yes  Motor Activity: Agitated  Interview Behavior: Cooperative  Preception: [place] to Person, [place] to Time, [place] to Place, [place] to Situation  Attention:Normal: No  Attention: Distractible  Thought Processes: Circumstantial  Thought Content:Normal: Yes  Thought Content: Preoccupations  Hallucinations: None  Delusions: No  Memory:Normal: Yes  Insight and Judgment: No  Insight and Judgment: Poor Insight  Present Suicidal Ideation: No  Present Homicidal Ideation: No      Metabolic Screening:    No results found for: LABA1C    Lab Results   Component Value Date    CHOL 127 06/20/2021     Lab Results   Component Value Date    TRIG 100 06/20/2021     Lab Results   Component Value Date    HDL 32 (L) 06/20/2021     No components found for: LDLCAL  No results found for: LABVLDL    Patient was discharged to a private residence with transportation provided by her parent. Patient was was ambulatory on discharge. Patient left with medications, valuables and discharge instructions.     Tamara Concepcion RN

## 2021-09-18 NOTE — SUICIDE SAFETY PLAN
SAFETY PLAN    A suicide Safety Plan is a document that supports someone when they are having thoughts of suicide. Warning Signs that indicate a suicidal crisis may be developing: What (situations, thoughts, feelings, body sensations, behaviors, etc.) do you experience that lets you know you are beginning to think about suicide? 1. Sweaty palms, racing heart and panic attacks  2. Negative thoughts  3. No motivation for weeks    Internal Coping Strategies:  What things can I do (relaxation techniques, hobbies, physical activities, etc.) to take my mind off my problems without contacting another person? 1. rosalio  2. meditation  3. drawing    People and social settings that provide distraction: Who can I call or where can I go to distract me? 1. Name: my grandparents   2. Name: friends house    3. Place: to the park with my brother                People whom I can ask for help: Who can I call when I need help - for example, friends, family, clergy, someone else? 1. Name: Carola Cowan (little brother)                  2. Name: Mom Oumou Levin)    3. Name: Diana Benítez (Grandfather)      Professionals or [address] [place] [address] agencies I can contact during a crisis: Who can I call for help - for example, my doctor, my psychiatrist, my psychologist, a mental health provider, a suicide hotline? 1. Clinician Name: Lalo Santana         Clinician Pager or Emergency Contact #:    2. Suicide Prevention Lifeline: 2-880-941-TALK (3422)    3. [address] [place] [address] Emergency Services -  for example, [facility] suicide hotline, [facility] Hotline: 211      Emergency Services Address:       Emergency Services Phone:     Making the environment safe: How can I make my environment (house/apartment/living space) safer? For example, can I remove guns, medications, and other items? 1. Removing addictive meds  2. Removing sharp objects  3.  Removing things to burn myself with

## 2021-09-18 NOTE — PROGRESS NOTES
denies       Data   height is 5' 4\" (1.626 m) and weight is 278 lb (126.1 kg). Her temporal temperature is 98 °F (36.7 °C). Her blood pressure is 115/74 and her pulse is 107. Her respiration is 14 and oxygen saturation is 100%. Labs:   No visits with results within 2 Day(s) from this visit.    Latest known visit with results is:   Admission on 09/14/2021, Discharged on 09/14/2021   Component Date Value Ref Range Status    Acetaminophen Level 09/14/2021 <5* 10 - 30 ug/mL Final    WBC 09/14/2021 12.5* 3.5 - 11.3 k/uL Final    RBC 09/14/2021 5.15* 3.95 - 5.11 m/uL Final    Hemoglobin 09/14/2021 14.6  11.9 - 15.1 g/dL Final    Hematocrit 09/14/2021 43.4  36.3 - 47.1 % Final    MCV 09/14/2021 84.3  82.6 - 102.9 fL Final    MCH 09/14/2021 28.3  25.2 - 33.5 pg Final    MCHC 09/14/2021 33.6  28.4 - 34.8 g/dL Final    RDW 09/14/2021 12.3  11.8 - 14.4 % Final    Platelets 16/49/1224 273  138 - 453 k/uL Final    MPV 09/14/2021 8.7  8.1 - 13.5 fL Final    NRBC Automated 09/14/2021 0.0  0.0 per 100 WBC Final    Differential Type 09/14/2021 NOT REPORTED   Final    Seg Neutrophils 09/14/2021 60  36 - 65 % Final    Lymphocytes 09/14/2021 26  24 - 43 % Final    Monocytes 09/14/2021 7  3 - 12 % Final    Eosinophils % 09/14/2021 5* 1 - 4 % Final    Basophils 09/14/2021 1  0 - 2 % Final    Immature Granulocytes 09/14/2021 1* 0 % Final    Segs Absolute 09/14/2021 7.61  1.50 - 8.10 k/uL Final    Absolute Lymph # 09/14/2021 3.20  1.10 - 3.70 k/uL Final    Absolute Mono # 09/14/2021 0.89  0.10 - 1.20 k/uL Final    Absolute Eos # 09/14/2021 0.62* 0.00 - 0.44 k/uL Final    Basophils Absolute 09/14/2021 0.10  0.00 - 0.20 k/uL Final    Absolute Immature Granulocyte 09/14/2021 0.07  0.00 - 0.30 k/uL Final    WBC Morphology 09/14/2021 NOT REPORTED   Final    RBC Morphology 09/14/2021 NOT REPORTED   Final    Platelet Estimate 38/09/7555 NOT REPORTED   Final    Glucose 09/14/2021 100* 70 - 99 mg/dL Final    BUN 09/14/2021 6  6 - 20 mg/dL Final    CREATININE 09/14/2021 0.82  0.50 - 0.90 mg/dL Final    Bun/Cre Ratio 09/14/2021 7* 9 - 20 Final    Calcium 09/14/2021 9.2  8.6 - 10.4 mg/dL Final    Sodium 09/14/2021 137  135 - 144 mmol/L Final    Potassium 09/14/2021 4.0  3.7 - 5.3 mmol/L Final    Chloride 09/14/2021 104  98 - 107 mmol/L Final    CO2 09/14/2021 20  20 - 31 mmol/L Final    Anion Gap 09/14/2021 13  9 - 17 mmol/L Final    Alkaline Phosphatase 09/14/2021 74  35 - 104 U/L Final    ALT 09/14/2021 31  5 - 33 U/L Final    AST 09/14/2021 19  <32 U/L Final    Total Bilirubin 09/14/2021 0.17* 0.3 - 1.2 mg/dL Final    Total Protein 09/14/2021 7.2  6.4 - 8.3 g/dL Final    Albumin 09/14/2021 4.2  3.5 - 5.2 g/dL Final    Albumin/Globulin Ratio 09/14/2021 1.4  1.0 - 2.5 Final    GFR Non- 09/14/2021 >60  >60 mL/min Final    GFR  09/14/2021 >60  >60 mL/min Final    GFR Comment 09/14/2021        Final    Comment: Average GFR for 20-28 years old:   80 mL/min/1.73sq m  Chronic Kidney Disease:   <60 mL/min/1.73sq m  Kidney failure:   <15 mL/min/1.73sq m              eGFR calculated using average adult body mass. Additional eGFR calculator available at:        [email or web address]            GFR Staging 09/14/2021        Final    Comment: Stage 1: Some kidney damage normal GFR  Stage 2: Mild kidney damage GFR 60-89   Stage 3: Moderate kidney damage GFR 30-59  Stage 4: Severe kidney damage GFR 15-29  Stage 5: Severe kidney damage GFR <15  ESRD - chronic treatment by dialysis or transplant            Ethanol 09/14/2021 <10  <10 mg/dL Final    Ethanol percent 09/14/2021 <0.010  <0.010 % Final    hCG Qual 09/14/2021 NEGATIVE  NEGATIVE Final    Comment: Specimens with hCG levels near the threshold of the test (25 mIU/mL) may give a negative or   indeterminate result. In such cases, another test should be performed with a new specimen   in 48-72 hours.   If early pregnancy is suspected clinically in this setting, correlation   with quantitative serum b-hCG level is suggested. [employer] has confirmed the use of plasma for this test. This has not been cleared   or approved by the U.S. Food and Drug Administration. The FDA has determined that such   clearance is not necessary.  Salicylate Lvl 92/35/8109 <1* 3 - 10 mg/dL Final    Amphetamine Screen, Ur 09/14/2021 NEGATIVE  NEGATIVE Final    Barbiturate Screen, Ur 09/14/2021 NEGATIVE  NEGATIVE Final    Benzodiazepine Screen, Urine 09/14/2021 NEGATIVE  NEGATIVE Final    Cocaine Metabolite, Urine 09/14/2021 NEGATIVE  NEGATIVE Final    Methadone Screen, Urine 09/14/2021 NEGATIVE  NEGATIVE Final    Opiates, Urine 09/14/2021 NEGATIVE  NEGATIVE Final    Phencyclidine, Urine 09/14/2021 NEGATIVE  NEGATIVE Final    Propoxyphene, Urine 09/14/2021 NEGATIVE  NEGATIVE Final    Cannabinoid Scrn, Ur 09/14/2021 POSITIVE* NEGATIVE Final    Oxycodone Screen, Ur 09/14/2021 NEGATIVE  NEGATIVE Final    Methamphetamine, Urine 09/14/2021 NEGATIVE  NEGATIVE Final    Tricyclic Antidepressants, Urine 09/14/2021 NEGATIVE  NEGATIVE Final    Comment: Drug screen results are to be used for medical purposes only. All positive results are   unconfirmed. Testing for employment or legal uses should be sent to a reference laboratory   for confirmation.       MDMA, Urine 09/14/2021 NOT REPORTED  NEGATIVE Final    Buprenorphine Urine 09/14/2021 NEGATIVE  NEGATIVE Final    Test Information 09/14/2021 NOT REPORTED   Final    Ventricular Rate 09/14/2021 106  BPM Final    Atrial Rate 09/14/2021 113  BPM Final    P-R Interval 09/14/2021 136  ms Final    QRS Duration 09/14/2021 88  ms Final    Q-T Interval 09/14/2021 346  ms Final    QTc Calculation (Bazett) 09/14/2021 459  ms Final    P Axis 09/14/2021 42  degrees Final    R Axis 09/14/2021 62  degrees Final    T Axis 09/14/2021 8  degrees Final    Specimen Description 09/14/2021 . NASOPHARYNGEAL SWAB   Final    SARS-CoV-2, Rapid 09/14/2021 Not Detected  Not Detected Final    Comment:       Rapid NAAT:  The specimen is NEGATIVE for SARS-CoV-2, the novel coronavirus associated with   COVID-19. The ID NOW COVID-19 assay is designed to detect the virus that causes COVID-19 in patients   with signs and symptoms of infection who are suspected of COVID-19. An individual without symptoms of COVID-19 and who is not shedding SARS-CoV-2 virus would   expect to have a negative (not detected) result in this assay. Negative results should be treated as presumptive and, if inconsistent with clinical signs   and symptoms or necessary for patient management,  should be tested with an alternative molecular assay. Negative results do not preclude   SARS-CoV-2 infection and   should not be used as the sole basis for patient management decisions.          Fact sheet for Healthcare Providers: Dmei.es  Fact sheet for Patients: Demi.es          Methodology: Isothermal Nucleic Acid Amplification      Color, UA 09/14/2021 YELLOW  YELLOW Final    Turbidity UA 09/14/2021 CLEAR  CLEAR Final    Glucose, Ur 09/14/2021 NEGATIVE  NEGATIVE Final    Bilirubin Urine 09/14/2021 NEGATIVE  NEGATIVE Final    Ketones, Urine 09/14/2021 NEGATIVE  NEGATIVE Final    Specific Gravity, UA 09/14/2021 1.020  1.010 - 1.020 Final    Urine Hgb 09/14/2021 NEGATIVE  NEGATIVE Final    pH, UA 09/14/2021 6.5  5.0 - 9.0 Final    Protein, UA 09/14/2021 NEGATIVE  NEGATIVE Final    Urobilinogen, Urine 09/14/2021 Normal  Normal Final    Nitrite, Urine 09/14/2021 NEGATIVE  NEGATIVE Final    Leukocyte Esterase, Urine 09/14/2021 NEGATIVE  NEGATIVE Final    Urinalysis Comments 09/14/2021 NOT REPORTED   Final    - 09/14/2021        Final    WBC, UA 09/14/2021 0 TO 2  0 - 5 /HPF Final    RBC, UA 09/14/2021 0 TO 2  0 - 2 /HPF Final    Casts UA 09/14/2021 NOT REPORTED  /LPF Final    Crystals, UA 09/14/2021 NOT REPORTED  None /HPF Final    Epithelial Cells UA 09/14/2021 5 TO 10  0 - 25 /HPF Final    Renal Epithelial, UA 09/14/2021 NOT REPORTED  0 /HPF Final    Bacteria, UA 09/14/2021 1+* None Final    Mucus, UA 09/14/2021 1+* None Final    Trichomonas, UA 09/14/2021 NOT REPORTED  None Final    Amorphous, UA 09/14/2021 NOT REPORTED  None Final    Other Observations UA 09/14/2021 NOT REPORTED  NOT REQ. Final    Yeast, UA 09/14/2021 NOT REPORTED  None Final            Medications  Current Facility-Administered Medications: [START ON 9/18/2021] ziprasidone (GEODON) capsule 80 mg, 80 mg, Oral, Dinner  [START ON 9/18/2021] escitalopram (LEXAPRO) tablet 5 mg, 5 mg, Oral, Daily  nicotine (NICODERM CQ) 14 MG/24HR 1 patch, 1 patch, TransDERmal, Daily  hydrOXYzine (ATARAX) tablet 10 mg, 10 mg, Oral, BID  meclizine (ANTIVERT) tablet 25 mg, 25 mg, Oral, TID PRN  prazosin (MINIPRESS) capsule 3 mg, 3 mg, Oral, Nightly  acetaminophen (TYLENOL) tablet 650 mg, 650 mg, Oral, Q4H PRN  aluminum & magnesium hydroxide-simethicone (MAALOX) 200-200-20 MG/5ML suspension 30 mL, 30 mL, Oral, Q6H PRN  hydrOXYzine (ATARAX) tablet 50 mg, 50 mg, Oral, TID PRN  ibuprofen (ADVIL;MOTRIN) tablet 400 mg, 400 mg, Oral, Q6H PRN  traZODone (DESYREL) tablet 50 mg, 50 mg, Oral, Nightly PRN  polyethylene glycol (GLYCOLAX) packet 17 g, 17 g, Oral, Daily PRN    ASSESSMENT  Severe recurrent major depression with psychotic features [facility])     PLAN  Patient s symptoms   are improving  Discontinue Zoloft and start Lexapro  Attempt to develop insight  Psycho-education conducted. Supportive Therapy conducted. Probable discharge is tomorrow  Follow-up daily while in the inpatient unit    More than 16 mins of the 30-minute session was spent doing Supportive psychotherapy.    Electronically signed by Carmella King MD on 9/17/21 at 9:20 PM EDT    **This report has been created using voice recognition software. It may contain minor errors which are inherent in voice recognition technology. **

## 2021-09-20 NOTE — CARE COORDINATION
Name: Brenton Hodgkin    : 2000    Discharge Date: 4154  Primary Auth/Cert #: [id number]     Destination: home     Discharge Medications:      Medication List      START taking these medications    escitalopram 5 MG tablet  Commonly known as: LEXAPRO  Take 1 tablet by mouth daily  Notes to patient: Help improve mood     ibuprofen 400 MG tablet  Commonly known as: ADVIL;MOTRIN  Take 1 tablet by mouth every 6 hours as needed for Pain  Notes to patient: Help with pain        CHANGE how you take these medications    hydrOXYzine 10 MG tablet  Commonly known as: ATARAX  Take 1 tablet by mouth 2 times daily for 10 days  What changed:   · medication strength  · how much to take  Notes to patient: Help with anxiety     traZODone 50 MG tablet  Commonly known as: DESYREL  Take 1 tablet by mouth nightly as needed for Sleep  What changed:   · when to take this  · reasons to take this  Notes to patient: Help with sleep     ziprasidone 80 MG capsule  Commonly known as: GEODON  Take 1 capsule by mouth Daily with supper  What changed: when to take this  Notes to patient: Help clear thoughts        CONTINUE taking these medications    prazosin 1 MG capsule  Commonly known as: MINIPRESS  Take 3 capsules by mouth nightly  Notes to patient: Help with blood pressure, PTSD        STOP taking these medications    ALEVE PO     ATIVAN PO     meclizine 25 MG tablet  Commonly known as: ANTIVERT     metFORMIN 500 MG extended release tablet  Commonly known as: GLUCOPHAGE-XR     ondansetron 4 MG tablet  Commonly known as: ZOFRAN     sertraline 50 MG tablet  Commonly known as: ZOLOFT           Where to Get Your Medications      These medications were sent to [facility], [address], [address] [facility] [address]    Phone: [phone]   · escitalopram 5 MG tablet  · hydrOXYzine 10 MG tablet  · ibuprofen 400 MG tablet  · prazosin 1 MG

## 2021-10-04 ENCOUNTER — HOSPITAL ENCOUNTER (OUTPATIENT)
Dept: NON INVASIVE DIAGNOSTICS | Age: 21
Discharge: HOME | End: 2021-10-04
Payer: COMMERCIAL

## 2021-10-04 DIAGNOSIS — R00.0 TACHYCARDIA: ICD-10-CM

## 2021-10-04 PROCEDURE — 93225 XTRNL ECG REC<48 HRS REC: CPT

## 2021-10-04 PROCEDURE — 93226 XTRNL ECG REC<48 HR SCAN A/R: CPT

## 2021-10-07 LAB
ACQUISITION DURATION: NORMAL S
AVERAGE HEART RATE: 92 BPM
EKG DIAGNOSIS: NORMAL
HOLTER MAX HEART RATE: 139 BPM
HOOKUP DATE: NORMAL
HOOKUP TIME: NORMAL
MAX HEART RATE TIME/DATE: NORMAL
MIN HEART RATE TIME/DATE: NORMAL
MIN HEART RATE: 53 BPM
NUMBER OF QRS COMPLEXES: NORMAL
NUMBER OF SUPRAVENTRICULAR COUPLETS: 0
NUMBER OF SUPRAVENTRICULAR ECTOPICS: 41
NUMBER OF SUPRAVENTRICULAR ISOLATED BEATS: 41
NUMBER OF VENTRICULAR BIGEMINAL CYCLES: 0
NUMBER OF VENTRICULAR COUPLETS: 1
NUMBER OF VENTRICULAR ECTOPICS: 5

## 2022-10-21 ENCOUNTER — HOSPITAL ENCOUNTER (OUTPATIENT)
Age: 22
Setting detail: SPECIMEN
Discharge: HOME | End: 2022-10-21

## 2022-10-22 LAB
SOURCE: NORMAL
TRICHOMONAS VAGINALI, MOLECULAR: NEGATIVE

## 2022-10-24 LAB
C. TRACHOMATIS DNA ,URINE: NEGATIVE
N. GONORRHOEAE DNA, URINE: NEGATIVE
SPECIMEN DESCRIPTION: NORMAL

## 2024-10-17 ENCOUNTER — HOSPITAL ENCOUNTER (OUTPATIENT)
Dept: HOSPITAL 101 - ER | Age: 24
Setting detail: OBSERVATION
LOS: 1 days | Discharge: HOME | End: 2024-10-18
Payer: COMMERCIAL

## 2024-10-17 VITALS
TEMPERATURE: 98.42 F | SYSTOLIC BLOOD PRESSURE: 117 MMHG | DIASTOLIC BLOOD PRESSURE: 78 MMHG | HEART RATE: 101 BPM | OXYGEN SATURATION: 98 %

## 2024-10-17 VITALS — OXYGEN SATURATION: 99 %

## 2024-10-17 VITALS — SYSTOLIC BLOOD PRESSURE: 148 MMHG | OXYGEN SATURATION: 99 % | DIASTOLIC BLOOD PRESSURE: 82 MMHG

## 2024-10-17 VITALS — OXYGEN SATURATION: 100 % | HEART RATE: 104 BPM

## 2024-10-17 VITALS
OXYGEN SATURATION: 97 % | HEART RATE: 94 BPM | SYSTOLIC BLOOD PRESSURE: 118 MMHG | DIASTOLIC BLOOD PRESSURE: 79 MMHG | TEMPERATURE: 98.2 F

## 2024-10-17 VITALS — TEMPERATURE: 98.2 F | SYSTOLIC BLOOD PRESSURE: 103 MMHG | DIASTOLIC BLOOD PRESSURE: 69 MMHG | OXYGEN SATURATION: 97 %

## 2024-10-17 VITALS
SYSTOLIC BLOOD PRESSURE: 120 MMHG | DIASTOLIC BLOOD PRESSURE: 70 MMHG | HEART RATE: 120 BPM | OXYGEN SATURATION: 98 % | TEMPERATURE: 98.7 F

## 2024-10-17 VITALS — SYSTOLIC BLOOD PRESSURE: 84 MMHG | OXYGEN SATURATION: 99 % | DIASTOLIC BLOOD PRESSURE: 50 MMHG

## 2024-10-17 VITALS
SYSTOLIC BLOOD PRESSURE: 84 MMHG | HEART RATE: 100 BPM | DIASTOLIC BLOOD PRESSURE: 60 MMHG | TEMPERATURE: 98.3 F | OXYGEN SATURATION: 98 %

## 2024-10-17 VITALS
DIASTOLIC BLOOD PRESSURE: 78 MMHG | OXYGEN SATURATION: 98 % | HEART RATE: 101 BPM | SYSTOLIC BLOOD PRESSURE: 117 MMHG | TEMPERATURE: 98 F

## 2024-10-17 VITALS — BODY MASS INDEX: 54.1 KG/M2 | BODY MASS INDEX: 58.5 KG/M2

## 2024-10-17 VITALS
DIASTOLIC BLOOD PRESSURE: 82 MMHG | HEART RATE: 99 BPM | SYSTOLIC BLOOD PRESSURE: 150 MMHG | TEMPERATURE: 98.8 F | OXYGEN SATURATION: 99 %

## 2024-10-17 VITALS — HEART RATE: 101 BPM | DIASTOLIC BLOOD PRESSURE: 85 MMHG | OXYGEN SATURATION: 100 % | SYSTOLIC BLOOD PRESSURE: 153 MMHG

## 2024-10-17 VITALS
OXYGEN SATURATION: 98 % | SYSTOLIC BLOOD PRESSURE: 100 MMHG | DIASTOLIC BLOOD PRESSURE: 65 MMHG | TEMPERATURE: 98.24 F | HEART RATE: 101 BPM

## 2024-10-17 VITALS — DIASTOLIC BLOOD PRESSURE: 78 MMHG | SYSTOLIC BLOOD PRESSURE: 135 MMHG | OXYGEN SATURATION: 98 %

## 2024-10-17 VITALS — OXYGEN SATURATION: 100 % | SYSTOLIC BLOOD PRESSURE: 150 MMHG | DIASTOLIC BLOOD PRESSURE: 82 MMHG

## 2024-10-17 VITALS — OXYGEN SATURATION: 100 %

## 2024-10-17 VITALS
HEART RATE: 100 BPM | SYSTOLIC BLOOD PRESSURE: 90 MMHG | DIASTOLIC BLOOD PRESSURE: 60 MMHG | OXYGEN SATURATION: 95 % | TEMPERATURE: 98.42 F

## 2024-10-17 VITALS — OXYGEN SATURATION: 98 % | HEART RATE: 106 BPM

## 2024-10-17 VITALS
DIASTOLIC BLOOD PRESSURE: 88 MMHG | OXYGEN SATURATION: 98 % | SYSTOLIC BLOOD PRESSURE: 139 MMHG | TEMPERATURE: 98.96 F | HEART RATE: 101 BPM

## 2024-10-17 VITALS — HEART RATE: 117 BPM

## 2024-10-17 VITALS — OXYGEN SATURATION: 99 % | HEART RATE: 106 BPM

## 2024-10-17 VITALS — HEART RATE: 102 BPM

## 2024-10-17 VITALS — OXYGEN SATURATION: 98 %

## 2024-10-17 VITALS — HEART RATE: 103 BPM

## 2024-10-17 DIAGNOSIS — Z79.3: ICD-10-CM

## 2024-10-17 DIAGNOSIS — F31.9: ICD-10-CM

## 2024-10-17 DIAGNOSIS — R55: ICD-10-CM

## 2024-10-17 DIAGNOSIS — D72.829: ICD-10-CM

## 2024-10-17 DIAGNOSIS — R00.0: ICD-10-CM

## 2024-10-17 DIAGNOSIS — D62: Primary | ICD-10-CM

## 2024-10-17 DIAGNOSIS — R06.03: ICD-10-CM

## 2024-10-17 DIAGNOSIS — E88.819: ICD-10-CM

## 2024-10-17 DIAGNOSIS — D47.02: ICD-10-CM

## 2024-10-17 DIAGNOSIS — G90.A: ICD-10-CM

## 2024-10-17 DIAGNOSIS — F17.290: ICD-10-CM

## 2024-10-17 DIAGNOSIS — R94.6: ICD-10-CM

## 2024-10-17 DIAGNOSIS — E28.2: ICD-10-CM

## 2024-10-17 DIAGNOSIS — N93.8: ICD-10-CM

## 2024-10-17 DIAGNOSIS — K76.0: ICD-10-CM

## 2024-10-17 DIAGNOSIS — I10: ICD-10-CM

## 2024-10-17 LAB
ADD MANUAL DIFF: NO
ALANINE AMINOTRANSFERASE: 51 U/L (ref 14–59)
ALBUMIN GLOBULIN RATIO: 0.9
ALBUMIN LEVEL: 3.2 G/DL (ref 3.4–5)
ALKALINE PHOSPHATASE: 89 U/L (ref 46–116)
ANION GAP: 13.3
ASPARTATE AMINO TRANSFERASE: 28 U/L (ref 15–37)
BLOOD UREA NITROGEN: 8 MG/DL (ref 7–18)
CALCIUM: 9 MG/DL (ref 8.5–10.1)
CARBON DIOXIDE: 25.7 MMOL/L (ref 21–32)
CAST SEEN?: (no result) #/LPF
CHLORIDE: 106 MMOL/L (ref 98–107)
CO2 BLD-SCNC: 25.7 MMOL/L (ref 21–32)
ESTIMATED GFR (AFRICAN AMERICA: >60
ESTIMATED GFR (NON-AFRICAN AME: >60
GLOBULIN: 3.6 G/DL
GLUCOSE BLD-MCNC: 123 MG/DL (ref 74–106)
GLUCOSE URINE UA: NEGATIVE MG/DL
HCT VFR BLD CALC: 19.8 % (ref 36–48)
HEMATOCRIT: 19.8 % (ref 36–48)
HEMOGLOBIN: 5.9 G/DL (ref 12–16)
IMMATURE GRANULOCYTES ABS AUTO: 0.14 10^3/UL (ref 0–0.03)
IMMATURE GRANULOCYTES PCT AUTO: 1 % (ref 0–0.5)
INR: 0.94
LACTATE/LACTIC ACID: 1.5 MMOL/L (ref 0.4–2)
LYMPHOCYTES  ABSOLUTE AUTO: 2.3 10^3/UL (ref 1.2–3.8)
MAGNESIUM: 2.1 MG/DL (ref 1.8–2.4)
MCV RBC: 82.5 FL (ref 81–99)
MEAN CORPUSCULAR HEMOGLOBIN: 24.6 PG (ref 26.7–34)
MEAN CORPUSCULAR HGB CONC: 29.8 G/DL (ref 29.9–35.2)
MEAN CORPUSCULAR VOLUME: 82.5 FL (ref 81–99)
PARTIAL THROMBOPLASTIN TIME: 21.7 SEC (ref 22.3–36.2)
PLATELET # BLD: 310 10^3/UL (ref 150–450)
PLATELET COUNT: 310 10^3/UL (ref 150–450)
POTASSIUM SERPLBLD-SCNC: 4 MMOL/L (ref 3.5–5.1)
POTASSIUM: 4 MMOL/L (ref 3.5–5.1)
PROTHROMBIN TIME: 10 SEC (ref 9–11.6)
RED BLOOD COUNT: 2.4 10^6/UL (ref 4.2–5.4)
SODIUM BLD-SCNC: 141 MMOL/L (ref 136–145)
SODIUM: 141 MMOL/L (ref 136–145)
THYROID STIMULATING HORMONE: 6.14 UIU/ML (ref 0.36–3.74)
TOTAL PROTEIN: 6.8 G/DL (ref 6.4–8.2)
URINE CULTURE INDICATED: YES
WBC # BLD: 14.3 10^3/UL (ref 4–11)
WHITE BLOOD COUNT: 14.3 10^3/UL (ref 4–11)

## 2024-10-17 PROCEDURE — 85378 FIBRIN DEGRADE SEMIQUANT: CPT

## 2024-10-17 PROCEDURE — 80053 COMPREHEN METABOLIC PANEL: CPT

## 2024-10-17 PROCEDURE — 86850 RBC ANTIBODY SCREEN: CPT

## 2024-10-17 PROCEDURE — 96376 TX/PRO/DX INJ SAME DRUG ADON: CPT

## 2024-10-17 PROCEDURE — 96374 THER/PROPH/DIAG INJ IV PUSH: CPT

## 2024-10-17 PROCEDURE — P9016 RBC LEUKOCYTES REDUCED: HCPCS

## 2024-10-17 PROCEDURE — 84703 CHORIONIC GONADOTROPIN ASSAY: CPT

## 2024-10-17 PROCEDURE — 83605 ASSAY OF LACTIC ACID: CPT

## 2024-10-17 PROCEDURE — 36415 COLL VENOUS BLD VENIPUNCTURE: CPT

## 2024-10-17 PROCEDURE — 97165 OT EVAL LOW COMPLEX 30 MIN: CPT

## 2024-10-17 PROCEDURE — 84443 ASSAY THYROID STIM HORMONE: CPT

## 2024-10-17 PROCEDURE — 83735 ASSAY OF MAGNESIUM: CPT

## 2024-10-17 PROCEDURE — 36430 TRANSFUSION BLD/BLD COMPNT: CPT

## 2024-10-17 PROCEDURE — 85025 COMPLETE CBC W/AUTO DIFF WBC: CPT

## 2024-10-17 PROCEDURE — 76830 TRANSVAGINAL US NON-OB: CPT

## 2024-10-17 PROCEDURE — 71045 X-RAY EXAM CHEST 1 VIEW: CPT

## 2024-10-17 PROCEDURE — 85730 THROMBOPLASTIN TIME PARTIAL: CPT

## 2024-10-17 PROCEDURE — 85027 COMPLETE CBC AUTOMATED: CPT

## 2024-10-17 PROCEDURE — 82948 REAGENT STRIP/BLOOD GLUCOSE: CPT

## 2024-10-17 PROCEDURE — 93005 ELECTROCARDIOGRAM TRACING: CPT

## 2024-10-17 PROCEDURE — 99285 EMERGENCY DEPT VISIT HI MDM: CPT

## 2024-10-17 PROCEDURE — 96361 HYDRATE IV INFUSION ADD-ON: CPT

## 2024-10-17 PROCEDURE — 96375 TX/PRO/DX INJ NEW DRUG ADDON: CPT

## 2024-10-17 PROCEDURE — 86923 COMPATIBILITY TEST ELECTRIC: CPT

## 2024-10-17 PROCEDURE — 86901 BLOOD TYPING SEROLOGIC RH(D): CPT

## 2024-10-17 PROCEDURE — 80048 BASIC METABOLIC PNL TOTAL CA: CPT

## 2024-10-17 PROCEDURE — 85610 PROTHROMBIN TIME: CPT

## 2024-10-17 PROCEDURE — 87086 URINE CULTURE/COLONY COUNT: CPT

## 2024-10-17 PROCEDURE — 94761 N-INVAS EAR/PLS OXIMETRY MLT: CPT

## 2024-10-17 PROCEDURE — 84484 ASSAY OF TROPONIN QUANT: CPT

## 2024-10-17 PROCEDURE — 86900 BLOOD TYPING SEROLOGIC ABO: CPT

## 2024-10-17 PROCEDURE — 81001 URINALYSIS AUTO W/SCOPE: CPT

## 2024-10-17 PROCEDURE — G0378 HOSPITAL OBSERVATION PER HR: HCPCS

## 2024-10-17 RX ADMIN — MEGESTROL ACETATE 20 MG: 40 SUSPENSION ORAL at 16:34

## 2024-10-17 RX ADMIN — SODIUM CHLORIDE 999 ML: 900 INJECTION, SOLUTION INTRAVENOUS at 14:56

## 2024-10-17 RX ADMIN — QUETIAPINE FUMARATE 50 MG: 25 TABLET ORAL at 21:21

## 2024-10-17 RX ADMIN — BUSPIRONE HYDROCHLORIDE 10 MG: 10 TABLET ORAL at 20:04

## 2024-10-17 RX ADMIN — ACETAMINOPHEN 1000 MG: 500 TABLET ORAL at 20:04

## 2024-10-17 RX ADMIN — ATORVASTATIN CALCIUM 20 MG: 20 TABLET, FILM COATED ORAL at 21:21

## 2024-10-17 RX ADMIN — DIPHENHYDRAMINE HYDROCHLORIDE 25 MG: 50 INJECTION INTRAMUSCULAR; INTRAVENOUS at 19:47

## 2024-10-17 RX ADMIN — KETOROLAC TROMETHAMINE 30 MG: 30 INJECTION, SOLUTION INTRAMUSCULAR; INTRAVENOUS at 14:56

## 2024-10-17 RX ADMIN — DEXAMETHASONE SODIUM PHOSPHATE 10 MG: 10 INJECTION INTRAMUSCULAR; INTRAVENOUS at 20:19

## 2024-10-17 NOTE — P.HP_ITS
HPI    
H&P: HPI    
History of Present Illness    
Chief complaint: GENERAL WEAKNESS ANEMIA VAginal Bleeding Blood Tra    
Narrative:     
Patient presented to the emergency room with mild dyspnea and near syncopal   
episode.  In ER found to have a hemoglobin of less than 6.  She has had bleeding  
for the last several months, heavy vaginal bleeding.  She is currently on birth   
control pills.  She does have a history of PCOS she also has a history of   
systemic mastocytosis.  Insulin resistance.    
    
When I saw patient up in the medical surgical floor, she was resting comfortably  
in bed eating dinner.  No complaints at the time.  But she is not up and moving   
obviously either.    
    
Opioid HPI    
Opioid Management    
Most Recent Pain and Opioid Data:     
    
    
                          Last Pain Assessment      10/17/24 17:57    
     
                Last ORT Total Score 11              10/17/24 17:06  10/17/24    
     
                Last ORT Risk Category High Risk       10/17/24 17:06  10/17/24    
    
    
    
PFSH    
PFSH    
Medical History (Updated 10/17/24 @ 17:47 by Jelly Tomlinson)    
    
PTSD (post-traumatic stress disorder)    
   ?F43.10 - Post-traumatic stress disorder, unspecified (ICD-10)    
Bipolar 1 disorder    
   ?F31.9 - Bipolar disorder, unspecified (ICD-10)    
Anxiety    
   ?F41.9 - Anxiety disorder, unspecified (ICD-10)    
Depression    
   ?F32.A - Depression, unspecified (ICD-10)    
PCOS (polycystic ovarian syndrome)    
   ?E28.2 - Polycystic ovarian syndrome (ICD-10)    
Insulin resistance    
   ?E88.819 - Insulin resistance, unspecified (ICD-10)    
Mastocytosis    
   ?D47.09 - Other mast cell neoplasms of uncertain behavior (ICD-10)    
POTS (postural orthostatic tachycardia syndrome)    
   ?G90.A - Postural orthostatic tachycardia syndrome [POTS] (ICD-10)    
Systemic mastocytosis    
   ?D47.02 - Systemic mastocytosis (ICD-10)    
    
    
Surgical History (Updated 10/17/24 @ 17:47 by Jelly Tomlinson)    
    
H/O esophagogastroduodenoscopy    
   ?Z98.890 - Other specified postprocedural states (ICD-10)    
Hx of tonsillectomy    
   ?Z90.89 - Acquired absence of other organs (ICD-10)    
    
    
Family History (Updated 10/17/24 @ 17:49 by Jelly Tomlinson)    
Grandmother   Family history of CHF (congestive heart failure)    
   Family history of COPD (chronic obstructive pulmonary disease)    
   Family history of hypertension    
Aunt   Family history of cancer    
   Family history of diabetes mellitus    
Grandfather   Family history of cancer    
   Family history of diabetes mellitus    
   Family history of hypertension    
Uncle   Family history of cancer    
   Family history of diabetes mellitus    
Father   Family history of hypertension    
    
    
Social History (Updated 10/17/24 @ 17:51 by Jelly Tomlinson)    
Within the past year, how often did you have a drink containing alcohol:    
monthly or less     
Within the past year, how many standard drinks containing alcohol did you have   
on a typical day:  1 or 2     
Within the past year, how often did you have six or more drinks on one occasion:  
  never     
Total score:  0     
Score interpretation:  A score less than 3 is consistent with normal alcohol   
consumption.     
Do you use any of these nicotine containing products:  vaping products     
Non-prescribed substance use:  denies use     
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     
Previous occupational history:  self employed     
Highest level of school completed/degree received:  some college, no degree     
Are you now , , , , never  or living with   
a partner:  living with partner     
Little interest or pleasure in doing things:  not at all     
Feeling down, depressed, or hopeless:  not at all     
Feel stressed/tense/nervous/anxious/difficulty sleeping:  only a little     
Gender Identity:  female     
    
    
    
Meds    
Home Medications and Allergies    
                                Home Medications    
    
    
    
?Medication ?Instructions ?Recorded ?Confirmed ?Type    
     
aripiprazole 5 mg tablet 5 mg PO DAILY 10/17/24 10/17/24 History    
     
atorvastatin 20 mg tablet 20 mg PO .QHS 10/17/24 10/17/24 History    
     
blood sugar diagnostic (OneTouch  10/17/24 10/17/24 History    
    
Ultra Test strips)        
     
blood-glucose meter (OneTouch  10/17/24 10/17/24 History    
    
Ultra2 Meter)        
     
buspirone 10 mg tablet 10 mg PO BID 10/17/24 10/17/24 History    
     
docusate sodium 100 mg capsule 100 mg PO .QHS PRN constipation 10/17/24 10/17/24  
 History    
     
drospirenone (contraceptive) 4 mg 4 mg PO DAILY 10/17/24 10/17/24 History    
    
(28) tablet (Slynd)        
     
ferrous sulfate 325 mg (65 mg 325 mg PO DAILY 10/17/24 10/17/24 History    
    
iron) tablet (FeroSul)        
     
guanfacine 1 mg tablet,extended 1 mg PO .QHS 10/17/24 10/17/24 History    
    
release 24 hr        
     
lamotrigine 100 mg tablet 100 mg PO DAILY 10/17/24 10/17/24 History    
     
lisinopril 5 mg tablet 5 mg PO DAILY 10/17/24 10/17/24 History    
     
prazosin 1 mg capsule 1 mg PO BID 10/17/24 10/17/24 History    
     
prazosin 1 mg capsule 1 mg PO Q12H 10/17/24 10/17/24 History    
     
quetiapine 50 mg tablet 50 mg PO .QHS 10/17/24 10/17/24 History    
     
quetiapine 50 mg tablet 50 mg PO .QHS 10/17/24 10/17/24 History    
     
sertraline 50 mg tablet 50 mg PO DAILY 10/17/24 10/17/24 History    
     
sertraline 50 mg tablet 50 mg PO Q24H 10/17/24 10/17/24 History    
     
sitagliptin phosphate 50 mg tablet 50 mg PO DAILY 10/17/24 10/17/24 History    
    
(Januvia)        
     
sitagliptin phosphate 50 mg tablet 50 mg PO DAILY 10/17/24 10/17/24 History    
    
(Januvia)        
    
    
    
                                    Allergies    
    
    
    
Allergy/AdvReac Type Severity Reaction Status Date / Time    
     
amoxicillin (From Augmentin) Allergy Severe Anaphylaxis Verified 10/17/24 13:16    
     
clavulanic acid (From Allergy Severe Anaphylaxis Verified 10/17/24 13:16    
    
Augmentin)         
     
Penicillins Allergy Severe Anaphylaxis Verified 10/17/24 13:16    
    
    
    
    
Exam    
Constitutional    
Vital Signs, click to edit/add:     
                                Last Vital Signs    
    
    
    
Temp  98.3 F   10/17/24 18:25    
     
Pulse  101 H  10/17/24 18:25    
     
Resp  20   10/17/24 18:25    
     
BP  100/65   10/17/24 18:25    
     
Pulse Ox  98   10/17/24 18:25    
     
O2 Del Method  Room Air  10/17/24 18:25    
    
    
    
Documenting provider has reviewed patient's vital signs: yes    
Common normals: no apparent distress    
Respiratory    
Common normals: normal respiratory effort and no retractions    
Cardio    
Common normals: regular rate and regular rhythm    
GI    
Common normals: Normal to inspection, nondistended, normoactive bowel sounds   
present (Morbid obesity), soft to palpation and non-tender    
    
Results    
Labs    
Labs:     
                                    Short CBC    
    
    
    
  10/17/24 Range/Units    
    
  14:20     
     
WBC  14.3 H  (4.0-11.0)  10^3/uL    
     
Hgb  5.9 L*  (12.0-16.0)  g/dL    
     
Hct  19.8 L*  (36.0-48.0)  %    
     
Plt Count  310  (150-450)  10^3/uL    
    
    
                                       BMP    
    
    
    
  10/17/24    
    
  14:20    
     
Sodium  141    
     
Potassium  4.0    
     
Chloride  106    
     
Carbon Dioxide  25.7    
     
BUN  8.0    
     
Creatinine  0.96    
     
Glucose  123 H    
     
Calcium  9.0    
    
    
                                 Liver Function    
    
    
    
  10/17/24 Range/Units    
    
  14:20     
     
Total Bilirubin  0.3  (0.2-1.0)  mg/dL    
     
AST  28  (15-37)  U/L    
     
ALT  51  (14-59)  U/L    
     
Alkaline Phosphatase  89  ()  U/L    
     
Albumin  3.2 L  (3.4-5.0)  g/dL    
    
    
                                      Urine    
    
    
    
  10/17/24 Range/Units    
    
  13:16     
     
Urine Color  Yellow  (YELLOW)      
     
Urine Clarity  Clear  (CLEAR)      
     
Urine pH  7.0  (5.0-9.0)      
     
Ur Specific Gravity  1.020  (1.005-1.025)      
     
Urine Protein  100 A  (NEG/TRACE)  mg/dL    
     
Urine Glucose (UA)  Negative  (NEGATIVE)  mg/dL    
    
    
    
    
Assessment and Plan    
Assessment and Plan    
(1) Vaginal bleeding:     
(2) Anemia requiring transfusions:     
(3) Bipolar 1 disorder:     
(4) PCOS (polycystic ovarian syndrome):     
(5) Mastocytosis:     
(6) Insulin resistance:     
(7) POTS (postural orthostatic tachycardia syndrome):     
(8) Systemic mastocytosis:     
    
Plan    
Admission findings: Sinus tachycardia, respiratory distress, uncontrolled   
hypertension, leukocytosis, severe anemia with hemoglobin less than 6 due to   
dysfunctional uterine bleeding over the last several months.    
--------------------------------------------------------------------------------    
    
    
Acute blood loss anemia secondary to acute NSTEMI acute heavy vaginal bleeding-  
checking on ultrasound, medications recommended by GYN, type cross and transfuse  
 2 units, my suspicion is she will need 1 additional unit tomorrow morning we   
will see how the labs play out repeat CBC around midnight.    
--------------------------------------------------------------------------------    
    
    
Systemic mastocytosis-would recommend patient to receive 1 dose of Decadron and   
Benadryl due to the getting the transfusion.    
--------------------------------------------------------------------------------    
    
    
Hepatic steatosis as well as insulin resistance-will check insulin sliding   
scale, monitor labs.    
--------------------------------------------------------------------------------    
    
    
Leukocytosis-check urinalysis, leukocytosis may be on the basis of demargination  
 from the acute and semiacute blood loss    
--------------------------------------------------------------------------------    
    
    
Elevated TSH-she can follow this as an outpatient.    
--------------------------------------------------------------------------------    
    
    
Bipolar disorder-maintain current medications    
--------------------------------------------------------------------------------    
    
    
Uncontrolled hypertension-maintain current medications, may be on the basis of   
her tachycardia and near syncope.    
--------------------------------------------------------------------------------    
    
    
Admission status: Patient with significant and symptomatic acute NSTEMI acute   
blood loss anemia-transfusion overnight, medically necessary treatment will   
likely span 1 midnight.  Observational status.  If further intervention possible  
 surgical intervention are necessary patient will be changed to inpatient status

## 2024-10-17 NOTE — ED_ITS
HPI    
HPI - General Adult    
General    
Chief complaint: Weakness    
Stated complaint: GENERAL WEAKNESS    
Time Seen by Provider: 10/17/24 14:29    
Source: patient    
Mode of arrival: walk-in    
History of Present Illness    
HPI narrative:     
Patient is a 24-year-old female with a history of PCOS who presents to the   
emergency department for increasing exertional dyspnea, intermittent episodes of  
syncope and generalized weakness.  She states she has had heavy vaginal bleeding  
for the last 5 months.  She has not gotten into see her gynecologist and in fact  
states she has not seen him in 3 years.  She has been on birth control for the   
last several months, drospirenone.  She does report pelvic cramping that has   
been constant for several months, no urinary symptoms or flank pain.    
Related Data    
                                    Allergies    
    
    
    
Allergy/AdvReac Type Severity Reaction Status Date / Time    
     
amoxicillin (From Augmentin) Allergy Severe Anaphylaxis Verified 10/17/24 13:16    
     
clavulanic acid (From Allergy Severe Anaphylaxis Verified 10/17/24 13:16    
    
Augmentin)         
     
Penicillins Allergy Severe Anaphylaxis Verified 10/17/24 13:16    
    
    
    
    
Opioid HPI    
Opioid Management    
Most Recent Opioid Data:     
    
    
                                        No Data to Display    
    
    
    
Review of Systems    
    
    
ROS      
    
 Constitutional Denies: fever or chills       
    
 Ears, nose, mouth, and throat Denies: throat pain or nasal congestion       
    
 Cardiovascular Denies: chest pain       
    
 Respiratory Reports: shortness of breath; Denies: cough       
    
 Gastrointestinal Denies: nausea or vomiting       
    
 Genitourinary Denies: painful urination       
    
 Neurological Denies: numbness in extremities or weakness in extremities       
    
 Hematologic/Lymphatic Denies: easy bruising or easy bleeding       
    
    
[facility]    
[facility]    
Medical History (Updated 10/17/24 @ 15:46 by IVIS Rothman)    
    
Systemic mastocytosis    
   ?D47.02 - Systemic mastocytosis (ICD-10)    
    
    
    
Exam    
Narrative    
Exam Narrative:     
Gen.: Awake, alert, in no distress    
Head: Normocephalic, atraumatic    
ENT: Moist mucous membranes    
Respiratory: No respiratory distress, lungs clear bilaterally    
Cardio: Regular rate and rhythm    
Gastrointestinal: Abdomen is soft, nondistended and nontender to palpation    
Extremities: Moves extremities equally    
Psych: Normal mood and affect    
Neuro: No focal neuro deficit    
Skin: Warm, dry, intact    
    
Constitutional    
Vital Signs, click to edit/add:     
    
                                Last Vital Signs    
    
    
    
Temp  98.7 F   10/17/24 13:07    
     
Pulse  120 H  10/17/24 13:07    
     
Resp  18   10/17/24 13:07    
     
BP  120/70   10/17/24 13:07    
     
Pulse Ox  98   10/17/24 13:07    
     
O2 Del Method  Room Air  10/17/24 13:07    
    
    
    
    
    
Course    
Vital Signs    
Vital signs:     
    
                                   Vital Signs    
    
    
    
Temperature  98.7 F   10/17/24 13:07    
     
Pulse Rate  120 H  10/17/24 13:07    
     
Respiratory Rate  18   10/17/24 13:07    
     
Blood Pressure  120/70   10/17/24 13:07    
     
Pulse Oximetry  98   10/17/24 13:07    
     
Oxygen Delivery Method  Room Air  10/17/24 13:07    
    
    
                                            
    
    
    
Temperature  98.7 F   10/17/24 13:07    
     
Pulse Rate  120 H  10/17/24 13:07    
     
Respiratory Rate  18   10/17/24 13:07    
     
Blood Pressure  120/70   10/17/24 13:07    
     
Pulse Oximetry  98   10/17/24 13:07    
     
Oxygen Delivery Method  Room Air  10/17/24 13:07    
    
    
    
    
    
Medical Decision Making    
MDM Narrative    
Medical decision making narrative:     
This patient had lab work obtained from the lobby while waiting for room   
assignment, she was noted to have a hemoglobin of 5.9 at time of getting a room   
assignment and she was made aware that she will need a blood transfusion and   
admission to the hospital.  Her D-dimer is normal.     
X-ray is unremarkable, I spoke with Dr. Patterson who recommends Megace 20 mg twice   
daily for 3 days and then 20 mg daily until she is seen in the office as an   
outpatient.  First dose of Megace was given in the ER and he also requested an   
ultrasound of the pelvis.  This was ordered for the patient and she is admitted   
for blood transfusion.  Stable at time of admission.    
    
    
SUPERVISED APC VISIT, PHYSICIAN ATTESTATION:     
    
Based on the medical record the care appears appropriate.    
    
?    
Medical Records    
Medical records reviewed: Yes I reviewed the patient's medical records    
Lab Data    
Lab results reviewed: Yes I reviewed the patient's lab results    
Labs:     
    
                                   Lab Results    
    
    
    
  10/17/24 10/17/24 Range/Units    
    
  14:20 14:40     
     
WBC  14.3 H   (4.0-11.0)  10^3/uL    
     
RBC  2.40 L   (4.20-5.40)  10^6/uL    
     
Hgb  5.9 L*   (12.0-16.0)  g/dL    
     
Hct  19.8 L*   (36.0-48.0)  %    
     
MCV  82.5   (81.0-99.0)  fL    
     
MCH  24.6 L   (26.7-34.0)  pg    
     
MCHC  29.8 L   (29.9-35.2)  g/dL    
     
RDW  17.6 H   (11.0-15.0)  %    
     
Plt Count  310   (150-450)  10^3/uL    
     
MPV  8.9 L   (9.5-13.5)  fL    
     
Neut % (Auto)  75.1 H   (43.0-75.0)  %    
     
Lymph % (Auto)  16.4 L   (20.5-60.0)  %    
     
Mono % (Auto)  5.8   (1.7-12.0)  %    
     
Eos % (Auto)  1.3   (0.9-7.0)  %    
     
Baso % (Auto)  0.4   (0.2-2.0)  %    
     
Neut # (Auto)  10.7 H   (1.4-6.5)  10^3/uL    
     
Lymph # (Auto)  2.3   (1.2-3.8)  10^3/uL    
     
Mono # (Auto)  0.8   (0.3-0.8)  10^3/uL    
     
Eos # (Auto)  0.2   (0.0-0.7)  10^3/uL    
     
Baso # (Auto)  0.1   (0.0-0.1)  10^3/uL    
     
Abs Immat Gran (auto)  0.14 H   (0.00-0.03)  10^3/uL    
     
Imm/Tot Granulo (auto)  1.0 H   (0.0-0.5)  %    
     
D-Dimer  0.39   (<=0.59)  mg/L FEU    
     
Sodium  141   (136-145)  mmol/L    
     
Potassium  4.0   (3.5-5.1)  mmol/L    
     
Chloride  106   ()  mmol/L    
     
Carbon Dioxide  25.7   (21.0-32.0)  mmol/L    
     
Anion Gap  13.3       
     
BUN  8.0   (7.0-18.0)  mg/dL    
     
Creatinine  0.96   (0.55-1.02)  mg/dL    
     
Est GFR ( Amer)  >60   (>=60 mL/min/1.73m^2)      
     
Est GFR (Non-Af Amer)  >60   (>=60 mL/min/1.73m^2)      
     
BUN/Creatinine Ratio  8.3       
     
Glucose  123 H   ()  mg/dL    
     
Lactate  1.5   (0.4-2.0)  mmol/L    
     
Calcium  9.0   (8.5-10.1)  mg/dL    
     
Magnesium  2.1   (1.8-2.4)  mg/dL    
     
Total Bilirubin  0.3   (0.2-1.0)  mg/dL    
     
AST  28   (15-37)  U/L    
     
ALT  51   (14-59)  U/L    
     
Alkaline Phosphatase  89   ()  U/L    
     
Troponin I High Sens  <4.0 L   (4.0-51.3)  pg/mL    
     
Total Protein  6.8   (6.4-8.2)  g/dL    
     
Albumin  3.2 L   (3.4-5.0)  g/dL    
     
Globulin  3.6   g/dL    
     
Albumin/Globulin Ratio  0.9       
     
TSH  6.138 H   (0.358-3.740)  uIU/mL    
     
Serum HCG, Qual  Negative   (NEGATIVE)      
     
Blood Type   B Positive      
     
Antibody Screen   Negative      
     
Crossmatch   See Detail      
    
    
    
    
Imaging Data    
Chest x-ray:     
      Attestation: I have reviewed the pertinent imaging results.    
ECG Data    
Attestation: I personally reviewed and interpreted this ECG as follows: (Sinus   
tachycardia at a rate of 112, no acute ST elevation or ectopy.  EKG reviewed by   
attending physician)    
    
Discharge Plan    
Discharge    
Chief Complaint: Weakness    
    
Patient Disposition: Admitted as Observation    
    
Time of Disposition Decision: 15:45    
    
Print Language: English    
    
Referrals:    
Physician,Non-Staff, MD [Physician] - 1 week

## 2024-10-17 NOTE — ED.GENADUL1
HPI
HPI - General Adult
General
Chief complaint: Weakness
Stated complaint: GENERAL WEAKNESS
Time Seen by Provider: 10/17/24 14:29
Source: patient
Mode of arrival: walk-in
History of Present Illness
HPI narrative: 
Patient is a 24-year-old female with a history of PCOS who presents to the emergency department for increasing exertional dyspnea, intermittent episodes of syncope and generalized weakness.  She states she has had heavy vaginal bleeding for the last 
5 months.  She has not gotten into see her gynecologist and in fact states she has not seen him in 3 years.  She has been on birth control for the last several months, drospirenone.  She does report pelvic cramping that has been constant for several 
months, no urinary symptoms or flank pain.
Related Data
Allergies

Allergy/AdvReac Type Severity Reaction Status Date / Time
amoxicillin (From Augmentin) Allergy Severe Anaphylaxis Verified 10/17/24 13:16
clavulanic acid (From Allergy Severe Anaphylaxis Verified 10/17/24 13:16
Augmentin)     
Penicillins Allergy Severe Anaphylaxis Verified 10/17/24 13:16



Opioid HPI
Opioid Management
Most Recent Opioid Data: 
      No Data to Display


Review of Systems
ROS  
 Constitutional Denies: fever or chills   
 Ears, nose, mouth, and throat Denies: throat pain or nasal congestion   
 Cardiovascular Denies: chest pain   
 Respiratory Reports: shortness of breath; Denies: cough   
 Gastrointestinal Denies: nausea or vomiting   
 Genitourinary Denies: painful urination   
 Neurological Denies: numbness in extremities or weakness in extremities   
 Hematologic/Lymphatic Denies: easy bruising or easy bleeding   

[facility]
Medical History (Updated 10/17/24 @ 15:46 by IVIS Rothman)

Systemic mastocytosis
 ?D47.02 - Systemic mastocytosis (ICD-10)



Exam
Narrative
Exam Narrative: 
Gen.: Awake, alert, in no distress
Head: Normocephalic, atraumatic
ENT: Moist mucous membranes
Respiratory: No respiratory distress, lungs clear bilaterally
Cardio: Regular rate and rhythm
Gastrointestinal: Abdomen is soft, nondistended and nontender to palpation
Extremities: Moves extremities equally
Psych: Normal mood and affect
Neuro: No focal neuro deficit
Skin: Warm, dry, intact

Constitutional
Vital Signs, click to edit/add: 

Last Vital Signs

Temp  98.7 F   10/17/24 13:07
Pulse  120 H  10/17/24 13:07
Resp  18   10/17/24 13:07
BP  120/70   10/17/24 13:07
Pulse Ox  98   10/17/24 13:07
O2 Del Method  Room Air  10/17/24 13:07




Course
Vital Signs
Vital signs: 

Vital Signs

Temperature  98.7 F   10/17/24 13:07
Pulse Rate  120 H  10/17/24 13:07
Respiratory Rate  18   10/17/24 13:07
Blood Pressure  120/70   10/17/24 13:07
Pulse Oximetry  98   10/17/24 13:07
Oxygen Delivery Method  Room Air  10/17/24 13:07



Temperature  98.7 F   10/17/24 13:07
Pulse Rate  120 H  10/17/24 13:07
Respiratory Rate  18   10/17/24 13:07
Blood Pressure  120/70   10/17/24 13:07
Pulse Oximetry  98   10/17/24 13:07
Oxygen Delivery Method  Room Air  10/17/24 13:07




Medical Decision Making
MDM Narrative
Medical decision making narrative: 
This patient had lab work obtained from the lobby while waiting for room assignment, she was noted to have a hemoglobin of 5.9 at time of getting a room assignment and she was made aware that she will need a blood transfusion and admission to the 
hospital.  Her D-dimer is normal. 
X-ray is unremarkable, I spoke with Dr. Patterson who recommends Megace 20 mg twice daily for 3 days and then 20 mg daily until she is seen in the office as an outpatient.  First dose of Megace was given in the ER and he also requested an ultrasound of 
the pelvis.  This was ordered for the patient and she is admitted for blood transfusion.  Stable at time of admission.


SUPERVISED APC VISIT, PHYSICIAN ATTESTATION: 

Based on the medical record the care appears appropriate.

?
Medical Records
Medical records reviewed: Yes I reviewed the patient's medical records
Lab Data
Lab results reviewed: Yes I reviewed the patient's lab results
Labs: 

Lab Results

  10/17/24 10/17/24 Range/Units
  14:20 14:40 
WBC  14.3 H   (4.0-11.0)  10^3/uL
RBC  2.40 L   (4.20-5.40)  10^6/uL
Hgb  5.9 L*   (12.0-16.0)  g/dL
Hct  19.8 L*   (36.0-48.0)  %
MCV  82.5   (81.0-99.0)  fL
MCH  24.6 L   (26.7-34.0)  pg
MCHC  29.8 L   (29.9-35.2)  g/dL
RDW  17.6 H   (11.0-15.0)  %
Plt Count  310   (150-450)  10^3/uL
MPV  8.9 L   (9.5-13.5)  fL
Neut % (Auto)  75.1 H   (43.0-75.0)  %
Lymph % (Auto)  16.4 L   (20.5-60.0)  %
Mono % (Auto)  5.8   (1.7-12.0)  %
Eos % (Auto)  1.3   (0.9-7.0)  %
Baso % (Auto)  0.4   (0.2-2.0)  %
Neut # (Auto)  10.7 H   (1.4-6.5)  10^3/uL
Lymph # (Auto)  2.3   (1.2-3.8)  10^3/uL
Mono # (Auto)  0.8   (0.3-0.8)  10^3/uL
Eos # (Auto)  0.2   (0.0-0.7)  10^3/uL
Baso # (Auto)  0.1   (0.0-0.1)  10^3/uL
Abs Immat Gran (auto)  0.14 H   (0.00-0.03)  10^3/uL
Imm/Tot Granulo (auto)  1.0 H   (0.0-0.5)  %
D-Dimer  0.39   (<=0.59)  mg/L FEU
Sodium  141   (136-145)  mmol/L
Potassium  4.0   (3.5-5.1)  mmol/L
Chloride  106   ()  mmol/L
Carbon Dioxide  25.7   (21.0-32.0)  mmol/L
Anion Gap  13.3   
BUN  8.0   (7.0-18.0)  mg/dL
Creatinine  0.96   (0.55-1.02)  mg/dL
Est GFR ( Amer)  >60   (>=60 mL/min/1.73m^2)  
Est GFR (Non-Af Amer)  >60   (>=60 mL/min/1.73m^2)  
BUN/Creatinine Ratio  8.3   
Glucose  123 H   ()  mg/dL
Lactate  1.5   (0.4-2.0)  mmol/L
Calcium  9.0   (8.5-10.1)  mg/dL
Magnesium  2.1   (1.8-2.4)  mg/dL
Total Bilirubin  0.3   (0.2-1.0)  mg/dL
AST  28   (15-37)  U/L
ALT  51   (14-59)  U/L
Alkaline Phosphatase  89   ()  U/L
Troponin I High Sens  <4.0 L   (4.0-51.3)  pg/mL
Total Protein  6.8   (6.4-8.2)  g/dL
Albumin  3.2 L   (3.4-5.0)  g/dL
Globulin  3.6   g/dL
Albumin/Globulin Ratio  0.9   
TSH  6.138 H   (0.358-3.740)  uIU/mL
Serum HCG, Qual  Negative   (NEGATIVE)  
Blood Type   B Positive  
Antibody Screen   Negative  
Crossmatch   See Detail  



Imaging Data
Chest x-ray: 
      Attestation: I have reviewed the pertinent imaging results.
ECG Data
Attestation: I personally reviewed and interpreted this ECG as follows: (Sinus tachycardia at a rate of 112, no acute ST elevation or ectopy.  EKG reviewed by attending physician)

Discharge Plan
Discharge
Chief Complaint: Weakness

Patient Disposition: Admitted as Observation

Time of Disposition Decision: 15:45

Print Language: English

Referrals:
Physician,Non-Staff, MD [Physician] - 1 week

## 2024-10-17 NOTE — XR_ITS
[address] [address] 
     [phone] 
  
  
Patient Name: 
SANTANA CORNELL 
  
MRN: [id number]    YOB: 2000    Sex: F 
Assigned Patient Location: ER 
Current Patient Location: ED.MAIN 
Accession/Order Number: [id number] 
Exam Date: 10/17/2024  15:20    Report Date: 10/17/2024  15:56 
  
At the request of: 
SAMANTHA DUTTON   
  
Procedure:  XR chest 1V 
  
EXAM: CHEST 1 VIEW 
  
HISTORY: Shortness of breath 
  
TECHNIQUE: Chest, one view. 
  
COMPARISON: None. 
  
FINDINGS:  
  
Lungs are clear. No focal consolidation, pleural effusion, or pneumothorax.  
Pulmonary vasculature is within normal limits. Cardiomediastinal silhouette is  
  
normal. 
  
ORDER #: [id number] XR/XR chest 1V  
IMPRESSION:   
1. No acute cardiopulmonary disease.   
   
   
Electronically authenticated by: CARY ROBERTS   Date: 10/17/2024  15:56

## 2024-10-17 NOTE — ECG_ITS
[facility] 
                                        
                                       Test Date:    2024-10-17 
Pat Name:     SANTANA CORNELL         Department:    
Patient ID:   [id number]               Room:         - 
Gender:       Female                   Technician:    
:          2000               Requested By:  
Order Number: [id number]              Reading MD:   CELIA NEAL 
                                 Measurements 
Intervals                              Axis           
Rate:         112                      P:            41 
MD:           136                      QRS:          60 
QRSD:         88                       T:            11 
QT:           324                                     
QTc:          391                                     
                           Interpretive Statements 
1120 Sinus tachycardia 
4068 Nonspecific Twave abnormality 
9140 **  abnormal rhythm ECG  ** 
No previous ECG available for comparison 
Electronically Signed On 10- 21:47:46 EDT by CELIA NEAL

## 2024-10-17 NOTE — US_ITS
The [address] [address] 
     [phone] 
  
  
Patient Name: 
SANTANA CORNELL 
  
MRN: [id number]    YOB: 2000    Sex: F 
Assigned Patient Location: [place] 
Current Patient Location: [place] 
Accession/Order Number: [id number] 
Exam Date: 10/17/2024  18:15    Report Date: 10/17/2024  20:08 
  
At the request of: 
SAMANTHA DUTTON   
  
Procedure:  US pelvis transvaginal 
  
US pelvis transvaginal 
  
HISTORY: Vaginal bleeding  
  
COMPARISONS: 5/14/2016 
  
TECHNIQUE: Transvaginal imaging of the pelvis was performed. 
  
FINDINGS:  
  
UTERUS: Normal in size and echogenicity. The uterus measures 8.8 x 4.4 x 5.5  
cm.  
  
MYOMETRIUM:Unremarkable. 
  
ENDOMETRIUM: The endometrium is abnormally thickened and heterogeneous in  
echotexture. The endometrium measures1.9 cm. 
  
RIGHT OVARY: Multiple peripheral follicles are present in the right ovary. No  
suspicious ovarian masses are present. There is enlargement of the right  
ovary.  
The right ovary measures 4.2 x 2.8 x 2.9 cm with a volume of 17.7 cc. 
  
LEFT OVARY: Multiple peripheral follicles are present in the left ovary. No  
suspicious ovarian masses are present. There is enlargement of the left ovary.  
  
The left ovary measures 3.9 x 2.5 x 3.0 cm with a volume of 15.3 cc. 
  
OTHER:There is no significant free fluid in the pelvis. 
  
ORDER #: [id number] US/US pelvis transvaginal  
IMPRESSION:   
   
1. Enlarged ovaries with multiple follicles typical of polycystic ovarian   
syndrome.  
2. Abnormal thickening and heterogeneity of the endometrium measuring 1.9 cm.   
This could be followed up with repeat pelvic sonography in 6 weeks.  
   
   
Electronically authenticated by: JESE MEZA   Date: 10/17/2024  20:08

## 2024-10-17 NOTE — PM.HP
HPI
H&P: HPI
History of Present Illness
Chief complaint: GENERAL WEAKNESS ANEMIA VAginal Bleeding Blood Tra
Narrative: 
Patient presented to the emergency room with mild dyspnea and near syncopal episode.  In ER found to have a hemoglobin of less than 6.  She has had bleeding for the last several months, heavy vaginal bleeding.  She is currently on birth control 
pills.  She does have a history of PCOS she also has a history of systemic mastocytosis.  Insulin resistance.

When I saw patient up in the medical surgical floor, she was resting comfortably in bed eating dinner.  No complaints at the time.  But she is not up and moving obviously either.

Opioid HPI
Opioid Management
Most Recent Pain and Opioid Data: 
      Last Pain Assessment 10/17/24 17:57  
      Last ORT Total Score 11 10/17/24 17:06 10/17/24
      Last ORT Risk Category High Risk 10/17/24 17:06 10/17/24


PFSH
PFSH
Medical History (Updated 10/17/24 @ 17:47 by Jelly Tomlinson)

PTSD (post-traumatic stress disorder)
 ?F43.10 - Post-traumatic stress disorder, unspecified (ICD-10)
Bipolar 1 disorder
 ?F31.9 - Bipolar disorder, unspecified (ICD-10)
Anxiety
 ?F41.9 - Anxiety disorder, unspecified (ICD-10)
Depression
 ?F32.A - Depression, unspecified (ICD-10)
PCOS (polycystic ovarian syndrome)
 ?E28.2 - Polycystic ovarian syndrome (ICD-10)
Insulin resistance
 ?E88.819 - Insulin resistance, unspecified (ICD-10)
Mastocytosis
 ?D47.09 - Other mast cell neoplasms of uncertain behavior (ICD-10)
POTS (postural orthostatic tachycardia syndrome)
 ?G90.A - Postural orthostatic tachycardia syndrome [POTS] (ICD-10)
Systemic mastocytosis
 ?D47.02 - Systemic mastocytosis (ICD-10)


Surgical History (Updated 10/17/24 @ 17:47 by Jelly Tomlinson)

H/O esophagogastroduodenoscopy
 ?Z98.890 - Other specified postprocedural states (ICD-10)
Hx of tonsillectomy
 ?Z90.89 - Acquired absence of other organs (ICD-10)


Family History (Updated 10/17/24 @ 17:49 by Jelly Tomlinson)
Grandmother
 Family history of CHF (congestive heart failure)
 Family history of COPD (chronic obstructive pulmonary disease)
 Family history of hypertension
Aunt
 Family history of cancer
 Family history of diabetes mellitus
Grandfather
 Family history of cancer
 Family history of diabetes mellitus
 Family history of hypertension
Uncle
 Family history of cancer
 Family history of diabetes mellitus
Father
 Family history of hypertension


Social History (Updated 10/17/24 @ 17:51 by Jelly Tomlinson)
Within the past year, how often did you have a drink containing alcohol:  monthly or less 
Within the past year, how many standard drinks containing alcohol did you have on a typical day:  1 or 2 
Within the past year, how often did you have six or more drinks on one occasion:  never 
Total score:  0 
Score interpretation:  A score less than 3 is consistent with normal alcohol consumption. 
Do you use any of these nicotine containing products:  vaping products 
Non-prescribed substance use:  denies use 
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 
Previous occupational history:  self employed 
Highest level of school completed/degree received:  some college, no degree 
Are you now , , , , never  or living with a partner:  living with partner 
Little interest or pleasure in doing things:  not at all 
Feeling down, depressed, or hopeless:  not at all 
Feel stressed/tense/nervous/anxious/difficulty sleeping:  only a little 
Gender Identity:  female 



Meds
Home Medications and Allergies
Home Medications

?Medication ?Instructions ?Recorded ?Confirmed ?Type
aripiprazole 5 mg tablet 5 mg PO DAILY 10/17/24 10/17/24 History
atorvastatin 20 mg tablet 20 mg PO .QHS 10/17/24 10/17/24 History
blood sugar diagnostic (OneTouch  10/17/24 10/17/24 History
Ultra Test strips)    
blood-glucose meter (OneTouch  10/17/24 10/17/24 History
Ultra2 Meter)    
buspirone 10 mg tablet 10 mg PO BID 10/17/24 10/17/24 History
docusate sodium 100 mg capsule 100 mg PO .QHS PRN constipation 10/17/24 10/17/24 History
drospirenone (contraceptive) 4 mg 4 mg PO DAILY 10/17/24 10/17/24 History
(28) tablet (Slynd)    
ferrous sulfate 325 mg (65 mg 325 mg PO DAILY 10/17/24 10/17/24 History
iron) tablet (FeroSul)    
guanfacine 1 mg tablet,extended 1 mg PO .QHS 10/17/24 10/17/24 History
release 24 hr    
lamotrigine 100 mg tablet 100 mg PO DAILY 10/17/24 10/17/24 History
lisinopril 5 mg tablet 5 mg PO DAILY 10/17/24 10/17/24 History
prazosin 1 mg capsule 1 mg PO BID 10/17/24 10/17/24 History
prazosin 1 mg capsule 1 mg PO Q12H 10/17/24 10/17/24 History
quetiapine 50 mg tablet 50 mg PO .QHS 10/17/24 10/17/24 History
quetiapine 50 mg tablet 50 mg PO .QHS 10/17/24 10/17/24 History
sertraline 50 mg tablet 50 mg PO DAILY 10/17/24 10/17/24 History
sertraline 50 mg tablet 50 mg PO Q24H 10/17/24 10/17/24 History
sitagliptin phosphate 50 mg tablet 50 mg PO DAILY 10/17/24 10/17/24 History
(Januvia)    
sitagliptin phosphate 50 mg tablet 50 mg PO DAILY 10/17/24 10/17/24 History
(Januvia)    


Allergies

Allergy/AdvReac Type Severity Reaction Status Date / Time
amoxicillin (From Augmentin) Allergy Severe Anaphylaxis Verified 10/17/24 13:16
clavulanic acid (From Allergy Severe Anaphylaxis Verified 10/17/24 13:16
Augmentin)     
Penicillins Allergy Severe Anaphylaxis Verified 10/17/24 13:16



Exam
Constitutional
Vital Signs, click to edit/add: 
Last Vital Signs

Temp  98.3 F   10/17/24 18:25
Pulse  101 H  10/17/24 18:25
Resp  20   10/17/24 18:25
BP  100/65   10/17/24 18:25
Pulse Ox  98   10/17/24 18:25
O2 Del Method  Room Air  10/17/24 18:25


Documenting provider has reviewed patient's vital signs: yes
Common normals: no apparent distress
Respiratory
Common normals: normal respiratory effort and no retractions
Cardio
Common normals: regular rate and regular rhythm
GI
Common normals: Normal to inspection, nondistended, normoactive bowel sounds present (Morbid obesity), soft to palpation and non-tender

Results
Labs
Labs: 
Short CBC

  10/17/24 Range/Units
  14:20 
WBC  14.3 H  (4.0-11.0)  10^3/uL
Hgb  5.9 L*  (12.0-16.0)  g/dL
Hct  19.8 L*  (36.0-48.0)  %
Plt Count  310  (150-450)  10^3/uL

BMP

  10/17/24
  14:20
Sodium  141
Potassium  4.0
Chloride  106
Carbon Dioxide  25.7
BUN  8.0
Creatinine  0.96
Glucose  123 H
Calcium  9.0

Liver Function

  10/17/24 Range/Units
  14:20 
Total Bilirubin  0.3  (0.2-1.0)  mg/dL
AST  28  (15-37)  U/L
ALT  51  (14-59)  U/L
Alkaline Phosphatase  89  ()  U/L
Albumin  3.2 L  (3.4-5.0)  g/dL

Urine

  10/17/24 Range/Units
  13:16 
Urine Color  Yellow  (YELLOW)  
Urine Clarity  Clear  (CLEAR)  
Urine pH  7.0  (5.0-9.0)  
Ur Specific Gravity  1.020  (1.005-1.025)  
Urine Protein  100 A  (NEG/TRACE)  mg/dL
Urine Glucose (UA)  Negative  (NEGATIVE)  mg/dL



Assessment and Plan
Assessment and Plan
(1) Vaginal bleeding: 
(2) Anemia requiring transfusions: 
(3) Bipolar 1 disorder: 
(4) PCOS (polycystic ovarian syndrome): 
(5) Mastocytosis: 
(6) Insulin resistance: 
(7) POTS (postural orthostatic tachycardia syndrome): 
(8) Systemic mastocytosis: 

Plan
Admission findings: Sinus tachycardia, respiratory distress, uncontrolled hypertension, leukocytosis, severe anemia with hemoglobin less than 6 due to dysfunctional uterine bleeding over the last several months.


Acute blood loss anemia secondary to acute NSTEMI acute heavy vaginal bleeding-checking on ultrasound, medications recommended by GYN, type cross and transfuse 2 units, my suspicion is she will need 1 additional unit tomorrow morning we will see how 
the labs play out repeat CBC around midnight.


Systemic mastocytosis-would recommend patient to receive 1 dose of Decadron and Benadryl due to the getting the transfusion.


Hepatic steatosis as well as insulin resistance-will check insulin sliding scale, monitor labs.


Leukocytosis-check urinalysis, leukocytosis may be on the basis of demargination from the acute and semiacute blood loss


Elevated TSH-she can follow this as an outpatient.


Bipolar disorder-maintain current medications


Uncontrolled hypertension-maintain current medications, may be on the basis of her tachycardia and near syncope.


Admission status: Patient with significant and symptomatic acute NSTEMI acute blood loss anemia-transfusion overnight, medically necessary treatment will likely span 1 midnight.  Observational status.  If further intervention possible surgical 
intervention are necessary patient will be changed to inpatient status

## 2024-10-18 VITALS
SYSTOLIC BLOOD PRESSURE: 108 MMHG | TEMPERATURE: 98.24 F | HEART RATE: 100 BPM | OXYGEN SATURATION: 98 % | DIASTOLIC BLOOD PRESSURE: 63 MMHG

## 2024-10-18 VITALS
OXYGEN SATURATION: 95 % | DIASTOLIC BLOOD PRESSURE: 68 MMHG | TEMPERATURE: 97.9 F | SYSTOLIC BLOOD PRESSURE: 108 MMHG | HEART RATE: 88 BPM

## 2024-10-18 VITALS — HEART RATE: 87 BPM

## 2024-10-18 VITALS — OXYGEN SATURATION: 95 % | SYSTOLIC BLOOD PRESSURE: 105 MMHG | DIASTOLIC BLOOD PRESSURE: 68 MMHG | TEMPERATURE: 98.24 F

## 2024-10-18 VITALS
DIASTOLIC BLOOD PRESSURE: 71 MMHG | HEART RATE: 90 BPM | OXYGEN SATURATION: 96 % | TEMPERATURE: 98.5 F | SYSTOLIC BLOOD PRESSURE: 115 MMHG

## 2024-10-18 VITALS
SYSTOLIC BLOOD PRESSURE: 108 MMHG | DIASTOLIC BLOOD PRESSURE: 63 MMHG | TEMPERATURE: 98.24 F | OXYGEN SATURATION: 98 % | HEART RATE: 100 BPM

## 2024-10-18 VITALS
HEART RATE: 89 BPM | TEMPERATURE: 98.06 F | SYSTOLIC BLOOD PRESSURE: 108 MMHG | DIASTOLIC BLOOD PRESSURE: 69 MMHG | OXYGEN SATURATION: 96 %

## 2024-10-18 VITALS — HEART RATE: 97 BPM

## 2024-10-18 VITALS
SYSTOLIC BLOOD PRESSURE: 108 MMHG | TEMPERATURE: 98 F | HEART RATE: 84 BPM | OXYGEN SATURATION: 94 % | DIASTOLIC BLOOD PRESSURE: 68 MMHG

## 2024-10-18 VITALS — HEART RATE: 91 BPM

## 2024-10-18 VITALS — HEART RATE: 85 BPM

## 2024-10-18 VITALS — HEART RATE: 102 BPM

## 2024-10-18 LAB
ADD MANUAL DIFF: NO
ADD MANUAL DIFF: NO
ANION GAP: 16.2
BLOOD UREA NITROGEN: 10 MG/DL (ref 7–18)
CALCIUM: 8.6 MG/DL (ref 8.5–10.1)
CARBON DIOXIDE: 20.2 MMOL/L (ref 21–32)
CHLORIDE: 106 MMOL/L (ref 98–107)
CO2 BLD-SCNC: 20.2 MMOL/L (ref 21–32)
ESTIMATED GFR (AFRICAN AMERICA: >60
ESTIMATED GFR (NON-AFRICAN AME: 60
GLUCOSE BLD-MCNC: 189 MG/DL (ref 74–106)
HCT VFR BLD CALC: 25.2 % (ref 36–48)
HCT VFR BLD CALC: 25.5 % (ref 36–48)
HCT VFR BLD CALC: 26.4 % (ref 36–48)
HEMATOCRIT: 25.2 % (ref 36–48)
HEMATOCRIT: 25.5 % (ref 36–48)
HEMATOCRIT: 26.4 % (ref 36–48)
HEMOGLOBIN: 7.8 G/DL (ref 12–16)
HEMOGLOBIN: 7.9 G/DL (ref 12–16)
HEMOGLOBIN: 8.4 G/DL (ref 12–16)
IMMATURE GRANULOCYTES ABS AUTO: 0.21 10^3/UL (ref 0–0.03)
IMMATURE GRANULOCYTES ABS AUTO: 0.29 10^3/UL (ref 0–0.03)
IMMATURE GRANULOCYTES PCT AUTO: 1.2 % (ref 0–0.5)
IMMATURE GRANULOCYTES PCT AUTO: 1.7 % (ref 0–0.5)
LYMPHOCYTES  ABSOLUTE AUTO: 1.2 10^3/UL (ref 1.2–3.8)
LYMPHOCYTES  ABSOLUTE AUTO: 1.3 10^3/UL (ref 1.2–3.8)
MCV RBC: 82 FL (ref 81–99)
MCV RBC: 82 FL (ref 81–99)
MCV RBC: 82.4 FL (ref 81–99)
MEAN CORPUSCULAR HEMOGLOBIN: 25.4 PG (ref 26.7–34)
MEAN CORPUSCULAR HEMOGLOBIN: 25.5 PG (ref 26.7–34)
MEAN CORPUSCULAR HEMOGLOBIN: 26.1 PG (ref 26.7–34)
MEAN CORPUSCULAR HGB CONC: 31 G/DL (ref 29.9–35.2)
MEAN CORPUSCULAR HGB CONC: 31 G/DL (ref 29.9–35.2)
MEAN CORPUSCULAR HGB CONC: 31.8 G/DL (ref 29.9–35.2)
MEAN CORPUSCULAR VOLUME: 82 FL (ref 81–99)
MEAN CORPUSCULAR VOLUME: 82 FL (ref 81–99)
MEAN CORPUSCULAR VOLUME: 82.4 FL (ref 81–99)
PLATELET # BLD: 275 10^3/UL (ref 150–450)
PLATELET # BLD: 293 10^3/UL (ref 150–450)
PLATELET # BLD: 313 10^3/UL (ref 150–450)
PLATELET COUNT: 275 10^3/UL (ref 150–450)
PLATELET COUNT: 293 10^3/UL (ref 150–450)
PLATELET COUNT: 313 10^3/UL (ref 150–450)
POTASSIUM SERPLBLD-SCNC: 4.4 MMOL/L (ref 3.5–5.1)
POTASSIUM: 4.4 MMOL/L (ref 3.5–5.1)
RED BLOOD COUNT: 3.06 10^6/UL (ref 4.2–5.4)
RED BLOOD COUNT: 3.11 10^6/UL (ref 4.2–5.4)
RED BLOOD COUNT: 3.22 10^6/UL (ref 4.2–5.4)
SODIUM BLD-SCNC: 138 MMOL/L (ref 136–145)
SODIUM: 138 MMOL/L (ref 136–145)
WBC # BLD: 17 10^3/UL (ref 4–11)
WBC # BLD: 17.2 10^3/UL (ref 4–11)
WBC # BLD: 18.1 10^3/UL (ref 4–11)
WHITE BLOOD COUNT: 17 10^3/UL (ref 4–11)
WHITE BLOOD COUNT: 17.2 10^3/UL (ref 4–11)
WHITE BLOOD COUNT: 18.1 10^3/UL (ref 4–11)

## 2024-10-18 RX ADMIN — BUSPIRONE HYDROCHLORIDE 10 MG: 10 TABLET ORAL at 09:23

## 2024-10-18 RX ADMIN — MEGESTROL ACETATE 20 MG: 40 SUSPENSION ORAL at 10:08

## 2024-10-18 RX ADMIN — LISINOPRIL 5 MG: 5 TABLET ORAL at 09:23

## 2024-10-18 RX ADMIN — LAMOTRIGINE 100 MG: 100 TABLET ORAL at 09:23

## 2024-10-18 RX ADMIN — SITAGLIPTIN 50 MG: 50 TABLET, FILM COATED ORAL at 09:23

## 2024-10-18 RX ADMIN — DIPHENHYDRAMINE HYDROCHLORIDE 25 MG: 50 INJECTION INTRAMUSCULAR; INTRAVENOUS at 04:43

## 2024-10-18 RX ADMIN — FERROUS SULFATE TAB 325 MG (65 MG ELEMENTAL FE) 325 MG: 325 (65 FE) TAB at 09:23

## 2024-10-18 RX ADMIN — SERTRALINE HYDROCHLORIDE 50 MG: 50 TABLET ORAL at 09:23

## 2024-10-18 RX ADMIN — ARIPIPRAZOLE 5 MG: 5 TABLET ORAL at 09:23

## 2024-10-18 RX ADMIN — DOCUSATE SODIUM 100 MG: 100 CAPSULE, LIQUID FILLED ORAL at 09:27

## 2024-10-18 NOTE — CM.NOTE
Rounds made with Dr. Wild, discussed with pt discharge to home today.  Dr. Wild will reach out to Dr. Patterson for further recommendations.  Pt will f/u with Dr. Patterson and PCP.

## 2024-10-18 NOTE — P.DS_ITS
DS: Providers    
Provider    
Date of admission:     
10/17/24 16:53    
    
Primary care physician:     
Jacquie Bacon NP    
    
Consults:     
                                            
    
10/17/24 18:00    
Consult to Pharmacy Routine     
   Consulting Provider:     
   Reason for consultation: Please [place] me when Med Rec is Updated    
   Has provider been notified: No    
Occupational Therapy Eval and Treat Routine     
   Reason for consultation: Only if needed for Rehab    
   Has provider been notified: No    
Physical Therapy Eval and Treat Routine     
   Reason for consultation: Eval and Treat    
   Has provider been notified: No    
    
    
    
    
DS: Diagnosis    
Discharge Diagnosis    
(1) Vaginal bleeding:     
(2) Anemia requiring transfusions:     
(3) Bipolar 1 disorder:     
(4) PCOS (polycystic ovarian syndrome):     
(5) Mastocytosis:     
(6) Insulin resistance:     
(7) POTS (postural orthostatic tachycardia syndrome):     
(8) Systemic mastocytosis:     
    
Plan    
Admission findings: Sinus tachycardia, respiratory distress, uncontrolled   
hypertension, leukocytosis, severe anemia with hemoglobin less than 6 due to   
dysfunctional uterine bleeding over the last several months.    
    
--------------------------------------------------------------------------------    
    
    
Acute blood loss anemia secondary to acute  heavy vaginal bleeding-checking on   
ultrasound, medications recommended by GYN, type cross and transfuse 2 units, my  
suspicion is she will need 1 additional unit tomorrow morning we will see how   
the labs play out repeat CBC around midnight.    
    
--------------------------------------------------------------------------------    
    
    
Systemic mastocytosis-would recommend patient to receive 1 dose of Decadron and   
Benadryl due to the getting the transfusion.    
    
--------------------------------------------------------------------------------    
    
    
Hepatic steatosis as well as insulin resistance-will check insulin sliding   
scale, monitor labs.    
    
--------------------------------------------------------------------------------    
    
    
Leukocytosis-check urinalysis, leukocytosis may be on the basis of demargination  
from the acute and semiacute blood loss    
    
--------------------------------------------------------------------------------    
    
    
Elevated TSH-she can follow this as an outpatient.    
    
--------------------------------------------------------------------------------    
    
    
Bipolar disorder-maintain current medications    
    
--------------------------------------------------------------------------------    
    
    
Uncontrolled hypertension-maintain current medications, may be on the basis of   
her tachycardia and near syncope.    
    
L    
    
Admission status: Patient with significant and symptomatic acute NSTEMI acute   
blood loss anemia-transfusion overnight, medically necessary treatment will   
likely span 1 midnight.  Observational status.  If further intervention possible  
surgical intervention are necessary patient will be changed to inpatient status    
    
    
?    
    
DS: Summary    
Hospital Course    
Hospital Course:    
Patient was admitted with increasing weakness and lightheadedness.  Near   
syncope.  In ER found to have significant anemia this is likely secondary to   
severe vaginal bleeding she has had over the last 3 months but more severe in   
the last couple of days.  She was admitted overnight given 2 units of PRBCs.    
She was still symptomatic and hemoglobin was not above 8, so she was given an   
additional unit of blood.  She feels much improved after the third unit of   
blood.  Her vaginal bleeding.  Case was discussed with gynecology, recommenda  
tion at this point is to discharge patient home with Megace 20 mg twice a day   
for 3 days and then 1020 mg a day for at least a month.  Follow-up with GYN in   
the meantime.  Patient agreeable with plan.  Medications to this.  Discharge to   
home today with follow-up with GYN within the next week or 2 and a PCP in the   
same timeframe    
Status at Discharge    
Overall status at discharge: patient is not back to baseline    
Time Spent with Patient    
Time attestation:     
Total time spent providing and/or coordinating discharge services:    
    
Time spent: greater than 30 minutes    
    
Exam    
Constitutional    
Vital Signs, click to edit/add:     
                                Last Vital Signs    
    
    
    
Temp  98.3 F   10/18/24 05:47    
     
Pulse  102 H  10/18/24 05:57    
     
Resp  16   10/18/24 05:47    
     
BP  108/63   10/18/24 05:47    
     
Pulse Ox  98   10/18/24 05:47    
     
O2 Del Method  Room Air  10/18/24 05:47    
    
    
    
Documenting provider has reviewed patient's vital signs: yes    
Common normals: no apparent distress    
Respiratory    
Common normals: normal respiratory effort and no retractions    
Cardio    
Common normals: regular rate and regular rhythm    
GI    
Common normals: Normal to inspection, nondistended, normoactive bowel sounds p  
resent (Morbid obesity), soft to palpation and non-tender    
    
DS: Data    
Data Completed and Pending    
Labs on day of discharge:     
                             Labs from last 24 hours    
    
    
    
  10/18/24 10/17/24 10/17/24    
    
  02:30 21:21 14:40    
     
WBC  17.2 H      
     
RBC  3.11 L      
     
Hgb  7.9 L      
     
Hct  25.5 L      
     
MCV  82.0      
     
MCH  25.4 L      
     
MCHC  31.0      
     
RDW  16.5 H      
     
Plt Count  313      
     
MPV  8.7 L      
     
Neut % (Auto)  89.2 H      
     
Lymph % (Auto)  7.8 L      
     
Mono % (Auto)  0.9 L      
     
Eos % (Auto)  0.2 L      
     
Baso % (Auto)  0.2      
     
Neut # (Auto)  15.3 H      
     
Lymph # (Auto)  1.3      
     
Mono # (Auto)  0.2 L      
     
Eos # (Auto)  0.0      
     
Baso # (Auto)  0.0      
     
Abs Immat Gran (auto)  0.29 H      
     
Imm/Tot Granulo (auto)  1.7 H      
     
PT       
     
INR       
     
APTT       
     
D-Dimer       
     
Sodium       
     
Potassium       
     
Chloride       
     
Carbon Dioxide       
     
Anion Gap       
     
BUN       
     
Creatinine       
     
Est GFR ( Amer)       
     
Est GFR (Non-Af Amer)       
     
BUN/Creatinine Ratio       
     
Glucose       
     
Lactate       
     
Calcium       
     
Magnesium       
     
Total Bilirubin       
     
AST       
     
ALT       
     
Alkaline Phosphatase       
     
Troponin I High Sens       
     
Total Protein       
     
Albumin       
     
Globulin       
     
Albumin/Globulin Ratio       
     
TSH       
     
Serum HCG, Qual       
     
Urine Color       
     
Urine Clarity       
     
Urine pH       
     
Ur Specific Gravity       
     
Urine Protein       
     
Urine Glucose (UA)       
     
Urine Ketones       
     
Urine Occult Blood       
     
Urine Nitrite       
     
Urine Bilirubin       
     
Urine Urobilinogen       
     
Ur Leukocyte Esterase       
     
Urine RBC       
     
Urine WBC       
     
Ur Squamous Epith Cells       
     
Urine Crystals       
     
Urine Bacteria       
     
Urine Casts       
     
Urine Mucus       
     
Ur Culture Indicated?       
     
POC Glucose   115 H     
     
Blood Type    B Positive    
     
Antibody Screen    Negative    
     
Crossmatch    See Detail    
    
    
    
    
  10/17/24 10/17/24    
    
  14:20 13:16    
     
WBC  14.3 H     
     
RBC  2.40 L     
     
Hgb  5.9 L*     
     
Hct  19.8 L*     
     
MCV  82.5     
     
MCH  24.6 L     
     
MCHC  29.8 L     
     
RDW  17.6 H     
     
Plt Count  310     
     
MPV  8.9 L     
     
Neut % (Auto)  75.1 H     
     
Lymph % (Auto)  16.4 L     
     
Mono % (Auto)  5.8     
     
Eos % (Auto)  1.3     
     
Baso % (Auto)  0.4     
     
Neut # (Auto)  10.7 H     
     
Lymph # (Auto)  2.3     
     
Mono # (Auto)  0.8     
     
Eos # (Auto)  0.2     
     
Baso # (Auto)  0.1     
     
Abs Immat Gran (auto)  0.14 H     
     
Imm/Tot Granulo (auto)  1.0 H     
     
PT  10.0     
     
INR  0.94     
     
APTT  21.7 L     
     
D-Dimer  0.39     
     
Sodium  141     
     
Potassium  4.0     
     
Chloride  106     
     
Carbon Dioxide  25.7     
     
Anion Gap  13.3     
     
BUN  8.0     
     
Creatinine  0.96     
     
Est GFR ( Amer)  >60     
     
Est GFR (Non-Af Amer)  >60     
     
BUN/Creatinine Ratio  8.3     
     
Glucose  123 H     
     
Lactate  1.5     
     
Calcium  9.0     
     
Magnesium  2.1     
     
Total Bilirubin  0.3     
     
AST  28     
     
ALT  51     
     
Alkaline Phosphatase  89     
     
Troponin I High Sens  <4.0 L     
     
Total Protein  6.8     
     
Albumin  3.2 L     
     
Globulin  3.6     
     
Albumin/Globulin Ratio  0.9     
     
TSH  6.138 H     
     
Serum HCG, Qual  Negative     
     
Urine Color   Yellow    
     
Urine Clarity   Clear    
     
Urine pH   7.0    
     
Ur Specific Gravity   1.020    
     
Urine Protein   100 A    
     
Urine Glucose (UA)   Negative    
     
Urine Ketones   Negative    
     
Urine Occult Blood   Large A    
     
Urine Nitrite   Negative    
     
Urine Bilirubin   Negative    
     
Urine Urobilinogen   0.2    
     
Ur Leukocyte Esterase   Negative    
     
Urine RBC   >100 A    
     
Urine WBC   0-2 A    
     
Ur Squamous Epith Cells   Rare    
     
Urine Crystals   None seen    
     
Urine Bacteria   Small A    
     
Urine Casts   None seen    
     
Urine Mucus   None seen    
     
Ur Culture Indicated?   Yes    
     
POC Glucose      
     
Blood Type      
     
Antibody Screen      
     
Crossmatch      
    
    
    
    
    
Discharge Plan    
Discharge    
Disposition: Home, Self-Care    
    
Condition: Good    
    
Discharge Medications:    
New    
  megestrol 20 mg tablet     
   20 mg PO BID MDD bid for 2 days, then daily Qty: 30 11RF    
   Rx Instructions:    
   bid for 2 days, then daily    
    
Continued    
  atorvastatin 20 mg tablet     
   20 mg PO .QHS     
  (DME) blood-glucose meter [OneTouch Ultra2 Meter]  Misc     
     MISCELLANEOUS      
  (DME) OneTouch Ultra Test  Strip     
     MISCELLANEOUS      
  buspirone 10 mg tablet     
   10 mg PO BID     
  docusate sodium 100 mg capsule     
   100 mg PO .QHS PRN (Reason: constipation)     
  Slynd 4 mg (28) tablet     
   4 mg PO DAILY     
  ferrous sulfate [FeroSul] 325 mg (65 mg iron) tablet     
   325 mg PO DAILY     
  guanfacine 1 mg tablet extended release 24 hr     
   1 mg PO .QHS     
  lamotrigine 100 mg tablet     
   100 mg PO DAILY     
  lisinopril 5 mg tablet     
   5 mg PO DAILY     
  prazosin 1 mg capsule     
   1 mg PO BID     
  quetiapine 50 mg tablet     
   50 mg PO .QHS     
  sertraline 50 mg tablet     
   50 mg PO DAILY     
  Januvia 50 mg tablet     
   50 mg PO DAILY     
  aripiprazole 5 mg tablet     
   5 mg PO DAILY     
  prazosin 1 mg capsule     
   1 mg PO Q12H     
  quetiapine 50 mg tablet     
   50 mg PO .QHS     
  sertraline 50 mg tablet     
   50 mg PO Q24H     
  Januvia 50 mg tablet     
   50 mg PO DAILY     
    
Print Language: English    
    
Patient Instructions:  Megestrol (By mouth), Anemia (DC)    
    
Forms:  Portal Instructions    
    
Follow Up Appointments: Thurs. Oct. 24 @ 4:15pm with Jacquie Balderas NP     
[phone]     
     
    
    
Discharge Date/Time: 10/18/24 10:41

## 2024-10-18 NOTE — PM.DS1
DS: Providers
Provider
Date of admission: 
10/17/24 16:53

Primary care physician: 
Jacquie Bacon NP

Consults: 


10/17/24 18:00
Consult to Pharmacy Routine 
 Consulting Provider: 
 Reason for consultation: Please [place] me when Med Rec is Updated
 Has provider been notified: No
Occupational Therapy Eval and Treat Routine 
 Reason for consultation: Only if needed for Rehab
 Has provider been notified: No
Physical Therapy Eval and Treat Routine 
 Reason for consultation: Eval and Treat
 Has provider been notified: No




DS: Diagnosis
Discharge Diagnosis
(1) Vaginal bleeding: 
(2) Anemia requiring transfusions: 
(3) Bipolar 1 disorder: 
(4) PCOS (polycystic ovarian syndrome): 
(5) Mastocytosis: 
(6) Insulin resistance: 
(7) POTS (postural orthostatic tachycardia syndrome): 
(8) Systemic mastocytosis: 

Plan
Admission findings: Sinus tachycardia, respiratory distress, uncontrolled hypertension, leukocytosis, severe anemia with hemoglobin less than 6 due to dysfunctional uterine bleeding over the last several months.



Acute blood loss anemia secondary to acute  heavy vaginal bleeding-checking on ultrasound, medications recommended by GYN, type cross and transfuse 2 units, my suspicion is she will need 1 additional unit tomorrow morning we will see how the labs 
play out repeat CBC around midnight.



Systemic mastocytosis-would recommend patient to receive 1 dose of Decadron and Benadryl due to the getting the transfusion.



Hepatic steatosis as well as insulin resistance-will check insulin sliding scale, monitor labs.



Leukocytosis-check urinalysis, leukocytosis may be on the basis of demargination from the acute and semiacute blood loss



Elevated TSH-she can follow this as an outpatient.



Bipolar disorder-maintain current medications



Uncontrolled hypertension-maintain current medications, may be on the basis of her tachycardia and near syncope.



Admission status: Patient with significant and symptomatic acute NSTEMI acute blood loss anemia-transfusion overnight, medically necessary treatment will likely span 1 midnight.  Observational status.  If further intervention possible surgical 
intervention are necessary patient will be changed to inpatient status


?

DS: Summary
Hospital Course
Hospital Course:
Patient was admitted with increasing weakness and lightheadedness.  Near syncope.  In ER found to have significant anemia this is likely secondary to severe vaginal bleeding she has had over the last 3 months but more severe in the last couple of 
days.  She was admitted overnight given 2 units of PRBCs.  She was still symptomatic and hemoglobin was not above 8, so she was given an additional unit of blood.  She feels much improved after the third unit of blood.  Her vaginal bleeding.  Case 
was discussed with gynecology, recommendation at this point is to discharge patient home with Megace 20 mg twice a day for 3 days and then 1020 mg a day for at least a month.  Follow-up with GYN in the meantime.  Patient agreeable with plan.  
Medications to this.  Discharge to home today with follow-up with GYN within the next week or 2 and a PCP in the same timeframe
Status at Discharge
Overall status at discharge: patient is not back to baseline
Time Spent with Patient
Time attestation: 
Total time spent providing and/or coordinating discharge services:

Time spent: greater than 30 minutes

Exam
Constitutional
Vital Signs, click to edit/add: 
Last Vital Signs

Temp  98.3 F   10/18/24 05:47
Pulse  102 H  10/18/24 05:57
Resp  16   10/18/24 05:47
BP  108/63   10/18/24 05:47
Pulse Ox  98   10/18/24 05:47
O2 Del Method  Room Air  10/18/24 05:47


Documenting provider has reviewed patient's vital signs: yes
Common normals: no apparent distress
Respiratory
Common normals: normal respiratory effort and no retractions
Cardio
Common normals: regular rate and regular rhythm
GI
Common normals: Normal to inspection, nondistended, normoactive bowel sounds present (Morbid obesity), soft to palpation and non-tender

DS: Data
Data Completed and Pending
Labs on day of discharge: 
Labs from last 24 hours

  10/18/24 10/17/24 10/17/24
  02:30 21:21 14:40
WBC  17.2 H  
RBC  3.11 L  
Hgb  7.9 L  
Hct  25.5 L  
MCV  82.0  
MCH  25.4 L  
MCHC  31.0  
RDW  16.5 H  
Plt Count  313  
MPV  8.7 L  
Neut % (Auto)  89.2 H  
Lymph % (Auto)  7.8 L  
Mono % (Auto)  0.9 L  
Eos % (Auto)  0.2 L  
Baso % (Auto)  0.2  
Neut # (Auto)  15.3 H  
Lymph # (Auto)  1.3  
Mono # (Auto)  0.2 L  
Eos # (Auto)  0.0  
Baso # (Auto)  0.0  
Abs Immat Gran (auto)  0.29 H  
Imm/Tot Granulo (auto)  1.7 H  
PT   
INR   
APTT   
D-Dimer   
Sodium   
Potassium   
Chloride   
Carbon Dioxide   
Anion Gap   
BUN   
Creatinine   
Est GFR ( Amer)   
Est GFR (Non-Af Amer)   
BUN/Creatinine Ratio   
Glucose   
Lactate   
Calcium   
Magnesium   
Total Bilirubin   
AST   
ALT   
Alkaline Phosphatase   
Troponin I High Sens   
Total Protein   
Albumin   
Globulin   
Albumin/Globulin Ratio   
TSH   
Serum HCG, Qual   
Urine Color   
Urine Clarity   
Urine pH   
Ur Specific Gravity   
Urine Protein   
Urine Glucose (UA)   
Urine Ketones   
Urine Occult Blood   
Urine Nitrite   
Urine Bilirubin   
Urine Urobilinogen   
Ur Leukocyte Esterase   
Urine RBC   
Urine WBC   
Ur Squamous Epith Cells   
Urine Crystals   
Urine Bacteria   
Urine Casts   
Urine Mucus   
Ur Culture Indicated?   
POC Glucose   115 H 
Blood Type    B Positive
Antibody Screen    Negative
Crossmatch    See Detail

  10/17/24 10/17/24
  14:20 13:16
WBC  14.3 H 
RBC  2.40 L 
Hgb  5.9 L* 
Hct  19.8 L* 
MCV  82.5 
MCH  24.6 L 
MCHC  29.8 L 
RDW  17.6 H 
Plt Count  310 
MPV  8.9 L 
Neut % (Auto)  75.1 H 
Lymph % (Auto)  16.4 L 
Mono % (Auto)  5.8 
Eos % (Auto)  1.3 
Baso % (Auto)  0.4 
Neut # (Auto)  10.7 H 
Lymph # (Auto)  2.3 
Mono # (Auto)  0.8 
Eos # (Auto)  0.2 
Baso # (Auto)  0.1 
Abs Immat Gran (auto)  0.14 H 
Imm/Tot Granulo (auto)  1.0 H 
PT  10.0 
INR  0.94 
APTT  21.7 L 
D-Dimer  0.39 
Sodium  141 
Potassium  4.0 
Chloride  106 
Carbon Dioxide  25.7 
Anion Gap  13.3 
BUN  8.0 
Creatinine  0.96 
Est GFR ( Amer)  >60 
Est GFR (Non-Af Amer)  >60 
BUN/Creatinine Ratio  8.3 
Glucose  123 H 
Lactate  1.5 
Calcium  9.0 
Magnesium  2.1 
Total Bilirubin  0.3 
AST  28 
ALT  51 
Alkaline Phosphatase  89 
Troponin I High Sens  <4.0 L 
Total Protein  6.8 
Albumin  3.2 L 
Globulin  3.6 
Albumin/Globulin Ratio  0.9 
TSH  6.138 H 
Serum HCG, Qual  Negative 
Urine Color   Yellow
Urine Clarity   Clear
Urine pH   7.0
Ur Specific Gravity   1.020
Urine Protein   100 A
Urine Glucose (UA)   Negative
Urine Ketones   Negative
Urine Occult Blood   Large A
Urine Nitrite   Negative
Urine Bilirubin   Negative
Urine Urobilinogen   0.2
Ur Leukocyte Esterase   Negative
Urine RBC   >100 A
Urine WBC   0-2 A
Ur Squamous Epith Cells   Rare
Urine Crystals   None seen
Urine Bacteria   Small A
Urine Casts   None seen
Urine Mucus   None seen
Ur Culture Indicated?   Yes
POC Glucose  
Blood Type  
Antibody Screen  
Crossmatch  




Discharge Plan
Discharge
Disposition: Home, Self-Care

Condition: Good

Discharge Medications:
New
  megestrol 20 mg tablet 
   20 mg PO BID MDD bid for 2 days, then daily Qty: 30 11RF
   Rx Instructions:
   bid for 2 days, then daily

Continued
  atorvastatin 20 mg tablet 
   20 mg PO .QHS 
  (DME) blood-glucose meter [OneTouch Ultra2 Meter]  Misc 
     MISCELLANEOUS  
  (DME) OneTouch Ultra Test  Strip 
     MISCELLANEOUS  
  buspirone 10 mg tablet 
   10 mg PO BID 
  docusate sodium 100 mg capsule 
   100 mg PO .QHS PRN (Reason: constipation) 
  Slynd 4 mg (28) tablet 
   4 mg PO DAILY 
  ferrous sulfate [FeroSul] 325 mg (65 mg iron) tablet 
   325 mg PO DAILY 
  guanfacine 1 mg tablet extended release 24 hr 
   1 mg PO .QHS 
  lamotrigine 100 mg tablet 
   100 mg PO DAILY 
  lisinopril 5 mg tablet 
   5 mg PO DAILY 
  prazosin 1 mg capsule 
   1 mg PO BID 
  quetiapine 50 mg tablet 
   50 mg PO .QHS 
  sertraline 50 mg tablet 
   50 mg PO DAILY 
  Januvia 50 mg tablet 
   50 mg PO DAILY 
  aripiprazole 5 mg tablet 
   5 mg PO DAILY 
  prazosin 1 mg capsule 
   1 mg PO Q12H 
  quetiapine 50 mg tablet 
   50 mg PO .QHS 
  sertraline 50 mg tablet 
   50 mg PO Q24H 
  Januvia 50 mg tablet 
   50 mg PO DAILY 

Print Language: English

Patient Instructions:  Megestrol (By mouth), Anemia (DC)

Forms:  Portal Instructions

Follow Up Appointments: Thurs. Oct. 24 @ 4:15pm with Jacquie Balderas NP 
[phone] 
 


Discharge Date/Time: 10/18/24 10:41

## 2024-10-19 ENCOUNTER — HOSPITAL ENCOUNTER (EMERGENCY)
Dept: HOSPITAL 101 - ER | Age: 24
Discharge: HOME | End: 2024-10-19
Payer: COMMERCIAL

## 2024-10-19 VITALS
DIASTOLIC BLOOD PRESSURE: 86 MMHG | TEMPERATURE: 98.78 F | SYSTOLIC BLOOD PRESSURE: 174 MMHG | OXYGEN SATURATION: 99 % | HEART RATE: 106 BPM

## 2024-10-19 VITALS — BODY MASS INDEX: 54.9 KG/M2

## 2024-10-19 DIAGNOSIS — F17.290: ICD-10-CM

## 2024-10-19 DIAGNOSIS — N93.9: Primary | ICD-10-CM

## 2024-10-19 DIAGNOSIS — E28.2: ICD-10-CM

## 2024-10-19 LAB
ADD MANUAL DIFF: NO
HCT VFR BLD CALC: 30.2 % (ref 36–48)
HEMATOCRIT: 30.2 % (ref 36–48)
HEMOGLOBIN: 9.3 G/DL (ref 12–16)
IMMATURE GRANULOCYTES ABS AUTO: 0.19 10^3/UL (ref 0–0.03)
IMMATURE GRANULOCYTES PCT AUTO: 1 % (ref 0–0.5)
LYMPHOCYTES  ABSOLUTE AUTO: 3.4 10^3/UL (ref 1.2–3.8)
MCV RBC: 83.4 FL (ref 81–99)
MEAN CORPUSCULAR HEMOGLOBIN: 25.7 PG (ref 26.7–34)
MEAN CORPUSCULAR HGB CONC: 30.8 G/DL (ref 29.9–35.2)
MEAN CORPUSCULAR VOLUME: 83.4 FL (ref 81–99)
PLATELET # BLD: 346 10^3/UL (ref 150–450)
PLATELET COUNT: 346 10^3/UL (ref 150–450)
RED BLOOD COUNT: 3.62 10^6/UL (ref 4.2–5.4)
WBC # BLD: 18.9 10^3/UL (ref 4–11)
WHITE BLOOD COUNT: 18.9 10^3/UL (ref 4–11)

## 2024-10-19 PROCEDURE — 36415 COLL VENOUS BLD VENIPUNCTURE: CPT

## 2024-10-19 PROCEDURE — 99284 EMERGENCY DEPT VISIT MOD MDM: CPT

## 2024-10-19 PROCEDURE — 96372 THER/PROPH/DIAG INJ SC/IM: CPT

## 2024-10-19 PROCEDURE — 85025 COMPLETE CBC W/AUTO DIFF WBC: CPT

## 2024-10-19 RX ADMIN — KETOROLAC TROMETHAMINE 60 MG: 30 INJECTION, SOLUTION INTRAMUSCULAR at 17:44

## 2024-10-19 RX ADMIN — PROMETHAZINE HYDROCHLORIDE 25 MG: 25 INJECTION INTRAMUSCULAR; INTRAVENOUS at 17:45

## 2024-10-19 NOTE — ED.FEMALEGU1
HPI - Female Genitourinary
General
Chief complaint: Vaginal Bleeding
Stated complaint: BLEED POST TRANSFUSION
Time Seen by Provider: 10/19/24 16:51
Source: patient
Mode of arrival: Wheelchair
Limitations: no limitations
History of Present Illness
HPI Narrative: 
Patient is a 24-year-old female with a history of PCOS who returns to the emergency department for reevaluation of vaginal bleeding.  This patient was seen in the ER 2 days ago and was found to be anemic requiring a blood transfusion secondary to a 
5-month history of dysfunctional uterine bleeding.  She had an ultrasound showing a thickened endometrium, she was found to have a hemoglobin of 5.9 and was admitted receiving 3 units of blood.  She states today she had a return of heavy vaginal 
bleeding and was concerned that she may be bleeding too much again.  She states she feels mildly dizzy.  She was started on megestrol twice daily while in the ER and discharged home with this medication.  She has an appoint with OB/GYN in 2 days.
Related Data
Home Medications

?Medication ?Instructions ?Recorded ?Confirmed
aripiprazole 5 mg tablet 5 mg PO DAILY 10/17/24 10/17/24
atorvastatin 20 mg tablet 20 mg PO .QHS 10/17/24 10/17/24
blood sugar diagnostic (OneTouch  10/17/24 10/17/24
Ultra Test strips)   
blood-glucose meter (OneTouch  10/17/24 10/17/24
Ultra2 Meter)   
buspirone 10 mg tablet 10 mg PO BID 10/17/24 10/17/24
docusate sodium 100 mg capsule 100 mg PO .QHS PRN constipation 10/17/24 10/17/24
drospirenone (contraceptive) 4 mg 4 mg PO DAILY 10/17/24 10/17/24
(28) tablet (Slynd)   
ferrous sulfate 325 mg (65 mg 325 mg PO DAILY 10/17/24 10/17/24
iron) tablet (FeroSul)   
guanfacine 1 mg tablet,extended 1 mg PO .QHS 10/17/24 10/17/24
release 24 hr   
lamotrigine 100 mg tablet 100 mg PO DAILY 10/17/24 10/17/24
lisinopril 5 mg tablet 5 mg PO DAILY 10/17/24 10/17/24
prazosin 1 mg capsule 1 mg PO BID 10/17/24 10/17/24
prazosin 1 mg capsule 1 mg PO Q12H 10/17/24 10/17/24
quetiapine 50 mg tablet 50 mg PO .QHS 10/17/24 10/17/24
quetiapine 50 mg tablet 50 mg PO .QHS 10/17/24 10/17/24
sertraline 50 mg tablet 50 mg PO DAILY 10/17/24 10/17/24
sertraline 50 mg tablet 50 mg PO Q24H 10/17/24 10/17/24
sitagliptin phosphate 50 mg tablet 50 mg PO DAILY 10/17/24 10/17/24
(Januvia)   
sitagliptin phosphate 50 mg tablet 50 mg PO DAILY 10/17/24 10/17/24
(Januvia)   

Previous Rx's

?Medication ?Instructions ?Recorded
megestrol 20 mg tablet 20 mg PO BID bid for 2 days, then 10/18/24
 daily #30 tabs 
ketorolac 10 mg tablet 10 mg PO TID PRN pain #10 tabs 10/19/24
promethazine 25 mg tablet 25 mg PO Q6H PRN nausea and 10/19/24
 vomiting #12 tabs 


Allergies

Allergy/AdvReac Type Severity Reaction Status Date / Time
amoxicillin (From Augmentin) Allergy Severe Anaphylaxis Verified 10/19/24 17:03
clavulanic acid (From Allergy Severe Anaphylaxis Verified 10/19/24 17:03
Augmentin)     
Penicillins Allergy Severe Anaphylaxis Verified 10/19/24 17:03



Review of Systems
ROS  
 Constitutional Denies: fever or chills   
 Cardiovascular Denies: chest pain   
 Respiratory Denies: shortness of breath   
 Gastrointestinal Reports: nausea; Denies: vomiting   
 Genitourinary Reports: pelvic pain   
 Neurological Denies: headache, numbness in extremities or weakness in extremities   
 Hematologic/Lymphatic Denies: easy bruising or easy bleeding   

[facility]
Medical History (Updated 10/19/24 @ 17:24 by IVIS Rothman)

PTSD (post-traumatic stress disorder)
 ?F43.10 - Post-traumatic stress disorder, unspecified (ICD-10)
Bipolar 1 disorder
 ?F31.9 - Bipolar disorder, unspecified (ICD-10)
Anxiety
 ?F41.9 - Anxiety disorder, unspecified (ICD-10)
Depression
 ?F32.A - Depression, unspecified (ICD-10)
PCOS (polycystic ovarian syndrome)
 ?E28.2 - Polycystic ovarian syndrome (ICD-10)
Insulin resistance
 ?E88.819 - Insulin resistance, unspecified (ICD-10)
Mastocytosis
 ?D47.09 - Other mast cell neoplasms of uncertain behavior (ICD-10)
POTS (postural orthostatic tachycardia syndrome)
 ?G90.A - Postural orthostatic tachycardia syndrome [POTS] (ICD-10)
Systemic mastocytosis
 ?D47.02 - Systemic mastocytosis (ICD-10)


Surgical History (Updated 10/17/24 @ 17:47 by Jelly Tomlinson)

H/O esophagogastroduodenoscopy
 ?Z98.890 - Other specified postprocedural states (ICD-10)
Hx of tonsillectomy
 ?Z90.89 - Acquired absence of other organs (ICD-10)


Family History (Updated 10/17/24 @ 17:49 by Jelly Tomlinson)
Grandmother
 Family history of CHF (congestive heart failure)
 Family history of COPD (chronic obstructive pulmonary disease)
 Family history of hypertension
Aunt
 Family history of cancer
 Family history of diabetes mellitus
Grandfather
 Family history of cancer
 Family history of diabetes mellitus
 Family history of hypertension
Uncle
 Family history of cancer
 Family history of diabetes mellitus
Father
 Family history of hypertension


Social History (Reviewed 10/19/24 @ 17:27 by IVIS Rothman)
Within the past year, how often did you have a drink containing alcohol:  monthly or less 
Within the past year, how many standard drinks containing alcohol did you have on a typical day:  1 or 2 
Within the past year, how often did you have six or more drinks on one occasion:  never 
Total score:  0 
Score interpretation:  A score less than 3 is consistent with normal alcohol consumption. 
Do you use any of these nicotine containing products:  vaping products 
Non-prescribed substance use:  denies use 
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 
Previous occupational history:  self employed 
Highest level of school completed/degree received:  some college, no degree 
Are you now , , , , never  or living with a partner:  living with partner 
Little interest or pleasure in doing things:  not at all 
Feeling down, depressed, or hopeless:  not at all 
Feel stressed/tense/nervous/anxious/difficulty sleeping:  only a little 
Gender Identity:  female 



Exam
Narrative
Exam Narrative: 
Gen.: Awake, alert, in no distress
Head: Normocephalic, atraumatic
ENT: Moist mucous membranes
Respiratory: No respiratory distress
Gastrointestinal: Abdomen is soft, nondistended and nontender to palpation
Extremities: Moves extremities equally
Psych: Normal mood and affect
Neuro: No focal neuro deficit
Skin: Warm, dry, intact

Constitutional
Vital Signs, click to edit/add: 

Last Vital Signs

Temp  98.7 F   10/19/24 17:03
Pulse  106 H  10/19/24 17:03
Resp  20   10/19/24 17:03
BP  174/86 H  10/19/24 17:03
Pulse Ox  99   10/19/24 17:03
O2 Del Method  Room Air  10/19/24 17:03




Course
Vital Signs
Vital signs: 

Vital Signs

Temperature  98.7 F   10/19/24 17:03
Pulse Rate  106 H  10/19/24 17:03
Respiratory Rate  20   10/19/24 17:03
Blood Pressure  174/86 H  10/19/24 17:03
Pulse Oximetry  99   10/19/24 17:03
Oxygen Delivery Method  Room Air  10/19/24 17:03



Temperature  98.7 F   10/19/24 17:03
Pulse Rate  106 H  10/19/24 17:03
Respiratory Rate  20   10/19/24 17:03
Blood Pressure  174/86 H  10/19/24 17:03
Pulse Oximetry  99   10/19/24 17:03
Oxygen Delivery Method  Room Air  10/19/24 17:03




MDM - Female Genitourinary
MDM Narrative
Medical decision making narrative: 
Repeat CBC shows hemoglobin 9.3 today.  This is improved from even discharge after 3 units of blood.  She was given education and reassurance.  She is not hypotensive.  She is not actively vomiting or febrile.  She should keep her appointment with 
Dr. Patterson on Monday as scheduled.  She was given Toradol and Phenergan for symptoms of nausea and abdominal cramping.  Family at bedside had additional questions about bleeding and clotting, these questions were answered and they were given 
reassurance that the patient is likely suffering from withdrawal bleeding from starting the Megace.  Return to the ER if symptoms change or worsen


SUPERVISED APC VISIT, PHYSICIAN ATTESTATION: 

Based on the medical record the care appears appropriate.

?
Medical Records
Attestation: I reviewed the patient's medical records.
Lab Data
Attestation: I reviewed the patient's lab results.
Labs: 

Lab Results

  10/19/24 Range/Units
  17:04 
WBC  18.9 H  (4.0-11.0)  10^3/uL
RBC  3.62 L  (4.20-5.40)  10^6/uL
Hgb  9.3 L  (12.0-16.0)  g/dL
Hct  30.2 L  (36.0-48.0)  %
MCV  83.4  (81.0-99.0)  fL
MCH  25.7 L  (26.7-34.0)  pg
MCHC  30.8  (29.9-35.2)  g/dL
RDW  17.6 H  (11.0-15.0)  %
Plt Count  346  (150-450)  10^3/uL
MPV  8.6 L  (9.5-13.5)  fL
Neut % (Auto)  75.4 H  (43.0-75.0)  %
Lymph % (Auto)  17.9 L  (20.5-60.0)  %
Mono % (Auto)  4.8  (1.7-12.0)  %
Eos % (Auto)  0.6 L  (0.9-7.0)  %
Baso % (Auto)  0.3  (0.2-2.0)  %
Neut # (Auto)  14.3 H  (1.4-6.5)  10^3/uL
Lymph # (Auto)  3.4  (1.2-3.8)  10^3/uL
Mono # (Auto)  0.9 H  (0.3-0.8)  10^3/uL
Eos # (Auto)  0.1  (0.0-0.7)  10^3/uL
Baso # (Auto)  0.1  (0.0-0.1)  10^3/uL
Abs Immat Gran (auto)  0.19 H  (0.00-0.03)  10^3/uL
Imm/Tot Granulo (auto)  1.0 H  (0.0-0.5)  %




Discharge Plan
Discharge
Chief Complaint: Vaginal Bleeding

Clinical Impression:
 Vaginal bleeding


Patient Disposition: Home, Self-Care

Time of Disposition Decision: 17:24

Condition: Good

Prescriptions / Home Meds:
New
  ketorolac 10 mg tablet 
   10 mg PO TID PRN (Reason: pain) Qty: 10 0RF
  promethazine 25 mg tablet 
   25 mg PO Q6H PRN (Reason: nausea and vomiting) Qty: 12 0RF

No Action
  atorvastatin 20 mg tablet 
   20 mg PO .QHS 
  (DME) blood-glucose meter [OneTouch Ultra2 Meter]  Misc 
     MISCELLANEOUS  
  (DME) OneTouch Ultra Test  Strip 
     MISCELLANEOUS  
  buspirone 10 mg tablet 
   10 mg PO BID 
  docusate sodium 100 mg capsule 
   100 mg PO .QHS PRN (Reason: constipation) 
  Slynd 4 mg (28) tablet 
   4 mg PO DAILY 
  ferrous sulfate [FeroSul] 325 mg (65 mg iron) tablet 
   325 mg PO DAILY 
  guanfacine 1 mg tablet extended release 24 hr 
   1 mg PO .QHS 
  lamotrigine 100 mg tablet 
   100 mg PO DAILY 
  lisinopril 5 mg tablet 
   5 mg PO DAILY 
  prazosin 1 mg capsule 
   1 mg PO BID 
  quetiapine 50 mg tablet 
   50 mg PO .QHS 
  sertraline 50 mg tablet 
   50 mg PO DAILY 
  Januvia 50 mg tablet 
   50 mg PO DAILY 
  aripiprazole 5 mg tablet 
   5 mg PO DAILY 
  prazosin 1 mg capsule 
   1 mg PO Q12H 
  quetiapine 50 mg tablet 
   50 mg PO .QHS 
  sertraline 50 mg tablet 
   50 mg PO Q24H 
  Januvia 50 mg tablet 
   50 mg PO DAILY 
  megestrol 20 mg tablet 
   20 mg PO BID MDD bid for 2 days, then daily Qty: 30 11RF
   Rx Instructions:
   bid for 2 days, then daily

Print Language: English

Instructions:  Abnormal (Dysfunctional) Uterine Bleeding (ED)

Additional Instructions:
Follow up with Dr. Patterson on Monday as scheduled

Referrals:
Jacquie Bacon NP [Primary Care Provider] - 1 week

## 2024-10-19 NOTE — XMS_ITS
Comprehensive CCD (C-CDA v2.1)  
  
                          Created on: 2024  
  
  
Linnette Beckham  
External Reference #: CDR,PersonID:[id number]  
: 2000  
Sex: Female  
  
Demographics  
  
  
                                        Address             [address] [place]  [address]  
   
                                        Mobile Phone        [phone]  
   
                                        Preferred Language  en  
   
                                        Marital Status      Single  
   
                                        [facility] Affiliation Unknown  
   
                                        Race                White  
   
                                        Ethnic Group        Not  or Lati  
no  
  
  
Author  
  
  
                                        Organization        [facility]  
  
  
Care Team Providers  
  
  
                                Care Team Member Name Role            Phone  
   
                                STANLEY NERI MD Unavailable     Unavailab  
le  
   
                                STANLEY NERI MD Unavailable     Unavailab  
le  
   
                                NONE            Unavailable     Unavailable  
   
                                ANYA OLIVARES Attending       Unavailable  
   
                                ANYA OLIVARES Consulting      Unavailable  
   
                                ANYA OLIVARES Admitting       Unavailable  
   
                                Doreen Cabrera CNP Primary Care Provider [phone]  
   
                                Deborah APRN - LARISSA Doreen M Primary Care Provider  
 [phone]  
   
                                MOLLY SALEH C Admitting       Unavailable  
   
                                MOLLY SALEH C Attending       Unavailable  
   
                                ISMAEL ORTEGA Referring       Unavailable  
   
                                DEBORAH, DOREEN M Primary Care    Unavailable  
   
                                MALIKA SHEPHERD Attending       Unavailable  
   
                                DEBORAH, DOREEN M Primary Care    Unavailable  
   
                                ISMAEL ORTEGA Attending       Unavailable  
   
                                DEBOARH, DOREEN M Primary Care    Unavailable  
   
                                RAFAEL GALLAGHER   Attending       Unavailable  
   
                                DEBORAH, DOREEN M Primary Care    Unavailable  
   
                                DEBORAH, DOREEN M Referring       Unavailable  
   
                                DEBORAH, DOREEN M Primary Care    Unavailable  
   
                                Deborah APRITZEL - LARISSA, Doreen M Primary Care Provider  
 [phone]  
   
                                DEBORAH, DOREEN M Referring       Unavailable  
   
                                DEBORAH, DOREEN M Primary Care    Unavailable  
   
                                Deborah Doreen DIAS Primary Care Provider [phone]  
   
                                DEBORAH, DOREEN M Primary Care    Unavailable  
   
                                ABRAHAM MARIE Attending       Unavailable  
   
                                ABRAHAM MARIE Attending       Unavailable  
   
                                MIKKI MARIEMAREINA CARNES Referring       Unavailable  
   
                                DEBORAH, DOREEN M Primary Care    Unavailable  
   
                                Deborah LARISSA Doreen Unavailable     [phone]  
  
  
  
Allergies  
  
  
                                                    Allergy   
Classification                          Reported   
Allergen(s)               Allergy Type              Date of   
Onset                     Reaction(s)               Facility  
   
                                                    Amoxicillin /   
Clavulanate  
(6 sources)                             Amoxicillin /   
Clavulanate;   
Translations:   
[Augmentin   
500-125 MG Oral   
Tablet]                   Drug Allergy                                       [facility]  
   
                                                    ARIPiprazole  
(6 sources)                             ARIPiprazole;   
Translations:   
[Abilify]                 Drug Allergy                
1                         groggy                    Health   
[facility]  
   
                                                    metFORMIN  
(6 sources)                             metFORMIN;   
Translations:   
[metformin]               Drug Allergy                
1                                       Nausea,   
Vomiting                                Health   
[facility]  
   
                                                    Serotonin Reuptake   
Inhibitors (SSRIs)  
(7 sources)                             Sertraline;   
Translations:   
[trazodone   
hydrochloride]            Drug Allergy                
1                                       Panic attack,   
slow/groggy                             Health   
[facility]  
   
                                                    Unclassified  
(6 sources)                             Amoxicillin-Pot   
Clavulanate;   
Translations:   
[AMOXICILLIN-PO  
T CLAVULANATE]                          Propensity to   
adverse   
reactions to   
drug                                      
7                                                   [facility]  
   
                                                      
(1 source)                              Amoxicillin /   
Clavulanate               Drug Allergy                
4                                                   [facility]   
Repository  
   
                                                      
(20 sources)                            Amoxicillin /   
Clavulanate;   
Translations:   
[Augmentin   
500-125 MG Oral   
Tablet]                   Drug Allergy                
1                         Shock                     [facility]  
   
                                                      
(20 sources)                            ARIPiprazole;   
Translations:   
[Abilify]                 Drug Allergy                
1                         groggy                    [facility]  
   
                                                      
(20 sources)                            metFORMIN;   
Translations:   
[METFORMIN]               Drug Allergy                
0                                       Nausea,   
Vomiting                                Health   
[facility]  
   
                                                      
(15 sources)        Sertraline          Drug Allergy          
1                         slow/ groggy              Health   
[facility]  
   
                                                      
(4 sources)         traZODone           Drug Allergy        10-  
2                         Panic attack              Health   
[facility]  
   
                                                      
(1 source)                              nickel;   
Translations:   
[NICKEL]                  Drug Allergy                
7                                                   [facility]   
Repository  
   
                                                      
(1 source)                              CARBONIC   
ANHYDRASE   
INHIBITORS;   
Translations:   
[CARBONIC   
ANHYDRASE   
INHIBITORS]                             Propensity to   
adverse   
reactions to   
drug   
(disorder)                                
7                                                   [facility]   
Repository  
   
                                                      
(8 sources)         Sertraline          Drug Allergy          
4                         slow/groggy               Health   
[facility]  
   
                                                      
(8 sources)         traZODone           Drug Allergy          
4                         Panic attack              Health   
[facility]  
  
  
  
Medications  
Current Medications  
  
  
  
                      Medication Drug Class(es) Dates      Sig (Normalized) Sig   
(Original)  
   
                                                    Alcohol Pads 70%  
(7 sources)                                         Start:   
2024                                          Alcohol Pads 70%   
2024   
Provider: Doreen Cabrera CNP  
   
                                                    ARIPiprazole 5 mg   
oral tablet  
(20 sources)                            Atypical   
Antipsychotic                           Start:   
10-  
End:   
2024                                          ARIPiprazole 5 MG   
Oral Tablet   
2024   
Provider: Doreen Cabrera CNP  
   
                                                    atorvastatin 20 mg   
oral tablet  
(7 sources)                             HMG-CoA Reductase   
Inhibitor                               Start:   
2024                                          Atorvastatin   
Calcium 20 MG Oral   
Tablet 2024   
Provider: Doreen Cabrera CNP  
   
                                                    Blood Glucose System   
Sriram Kit  
(7 sources)                                         Start:   
2024                                          Blood Glucose   
System Sriram Kit   
2024   
Provider: Dorene Cabrera CNP  
   
                                                    busPIRone   
hydrochloride 10 mg   
oral tablet  
(20 sources)                                        Start:   
2024                                          busPIRone HCl 10   
MG Oral Tablet   
2024   
Provider: Doreen Cabrera CNP  
  
  
  
                                                    Start: 2023  
End: 2024                                     busPIRone HCl 5 MG Oral Tabl  
et 2024 - 2024   
Provider:   
Doreen Cabrera CNP  
  
  
  
                                                    docusate sodium 100   
mg oral capsule  
(5 sources)                     Start: 2024                 Colace 100 MG   
Oral   
Capsule 2024   
Provider: Doreen Cabrera CNP  
   
                                                    24 hr guanFACINE 1 mg   
extended release oral   
tablet  
(17 sources)                            Central alpha-2   
Adrenergic Agonist                      Start: 2024  
End: 2024                                     Intuniv 1 MG Oral   
Tablet Extended   
Release 24 Hour   
2024 Provider:   
Doreen Cabrera CNP  
   
                                                    hydrOXYzine pamoate   
50 mg oral capsule  
(2 sources)     Antihistamine   Start: 2021                 hydrOXYzine   
(VISTARIL) capsule 50   
mg  
  
  
  
                                                take 1 tablet by mouth once verito  
y hydrOXYzine (ATARAX) 10 MG tablet Take 10 mg   
by   
mouth nightly 0 Active  
  
  
  
                                                    ibuprofen 400 mg   
oral tablet  
(2 sources)                             Nonsteroidal   
Anti-inflammatory Drug                  Start:   
2021                              take 1 tablet   
by mouth   
every six   
hours as   
needed for   
pain                                    ibuprofen   
(ADVIL;MOTRIN) 400   
MG tablet Take 1   
tablet by mouth   
every 6 hours as   
needed for Pain   
120 tablet 0   
2021 Active  
   
                                                    Iron  
(5 sources)                                         Start:   
2024                                          [facility] Iron 325 (65   
Fe) MG Oral Tablet   
2024   
Provider: Doreen Cabrera CNP  
   
                                                    lisinopril 5 mg   
oral tablet  
(7 sources)                             Angiotensin Converting   
Enzyme Inhibitor                        Start:   
2024                                          Lisinopril 5 MG   
Oral Tablet   
2024   
Provider: Doreen   
Deborah CNP  
   
                                                    LORazepam 0.5 mg   
oral tablet  
(3 sources)         Benzodiazepine                          take 0.5 mg   
by mouth once   
daily as   
needed                                  LORazepam (ATIVAN   
PO) Take 0.5 mg by   
mouth daily as   
needed 0 Active  
   
                                                    meclizine   
hydrochloride 25   
mg oral tablet  
(3 sources)               Antiemetic                Start:   
2017                              take 1 tablet   
by mouth   
three times   
daily as   
needed for   
dizziness                               meclizine   
(ANTIVERT) 25 MG   
tablet Take 1   
tablet by mouth 3   
times daily as   
needed for   
Dizziness 30   
tablet 0   
2017 Active  
   
                                                    naloxone   
hydrochloride 40   
mg/ml nasal spray  
(20 sources)              Opioid Antagonist         Start:   
2021  
End:   
2021                                          Narcan 4 MG/0.1ML   
Nasal Liquid   
2021   
Provider: Doreen Cabrera CNP  
   
                                                    Naproxen  
(3 sources)                             Nonsteroidal   
Anti-inflammatory Drug                                         Naproxen Sodium   
(ALEVE PO) Take 1   
tablet by mouth as   
needed 0 Active  
   
                                                    polyethylene   
glycol 3350 [id number]   
mg powder for oral   
solution  
(20 sources)              Osmotic Laxative          Start:   
2021                                          MiraLax 17   
GM/SCOOP Oral   
Powder 2021   
Provider: Doreen Cabrera CNP  
   
                                                    QUEtiapine 50 mg   
oral tablet  
(5 sources)               Atypical Antipsychotic    Start:   
2024                                          SEROquel 50 MG   
Oral Tablet   
2024   
Provider: Doreen Cabrera CNP  
   
                                                    SITagliptin 50 mg   
oral tablet  
(10 sources)                            Dipeptidyl Peptidase 4   
Inhibitor                               Start:   
2024                                          Januvia 50 MG Oral   
Tablet 2024   
Provider: Doreen Cabrera CNP  
  
  
  
                                                take 500 mg by mouth once daily   
SITagliptin Phosphate (JANUVIA PO) Take 500 mg   
by   
mouth daily 0 Active  
  
  
  
                                                    topiramate 25 mg oral   
tablet  
(3 sources)                                                 take 25 mg by mouth   
twice daily                             Topiramate (TOPAMAX PO)   
Take 25 mg by mouth 2   
times daily 0 Active  
   
                                                    {24 (drospirenone 4 MG   
Oral Tablet) / 4 (inert   
ingredients 1 MG Oral   
Tablet) } Pack [Slynd]  
(7 sources)                                         Start:   
2024                                          Slynd 4 MG Oral Tablet   
2024 Provider:   
Doreen Cabrera CNP  
  
  
  
Completed/Discontinued Medications  
  
  
  
                      Medication Drug Class(es) Dates      Sig (Normalized) Sig   
(Original)  
   
                                                    ALPRAZolam 0.5 mg oral   
tablet  
(1 source)                Benzodiazepine            Start:   
2021  
End:   
2021                                          ALPRAZolam   
(XANAX) tablet   
0.5 mg  
   
                                                    azithromycin 250 mg oral   
tablet  
(20 sources)                            Macrolide   
Antimicrobial                           Start:   
2021  
End:   
2021                                          Azithromycin 250   
MG Oral Tablet   
2021 -   
2021   
Provider:  
   
                                                    brompheniramine maleate   
0.4 mg/ml /   
dextromethorphan   
hydrobromide 2 mg/ml /   
pseudoephedrine   
hydrochloride 6 mg/ml   
oral solution  
(20 sources)                            alpha-Adrenergic   
Agonist,   
Uncompetitive   
N-methyl-D-aspartat  
e Receptor   
Antagonist, Sigma-1   
Agonist                                 Start:   
2021  
End:   
2021                                          Pseudoeph-Bromph  
en-DM 30-2-10   
MG/5ML Oral   
Syrup 2021   
- 2021   
Provider:  
   
                                                    escitalopram 5 mg oral   
tablet  
(20 sources)                            Serotonin Reuptake   
Inhibitor                               Start:   
2021  
End:   
10-                                          Lexapro 5 MG   
Oral Tablet   
2021 -   
2021   
Provider:  
   
                                                    hydroCHLOROthiazide 25   
mg / spironolactone 25   
mg oral tablet  
(20 sources)                            Thiazide Diuretic,   
Aldosterone   
Antagonist                              Start:   
2023  
End:   
2023                                          Spironolactone-H  
CTZ 25-25 MG   
Oral Tablet   
2023 -   
2023   
Provider: Doreen Cabrera CNP  
  
  
  
                                                    Start: 2021  
End: 2023                                     Aldactazide 50-50 MG Oral Ta  
blet 06/10/2021 - 2021   
Provider: Doreen Cabrera CNP  
  
  
  
                                                    hydrocortisone 10   
mg/ml / neomycin 3.5   
mg/ml / polymyxin b   
55167 unt/ml otic   
suspension  
(20 sources)                            Aminoglycoside   
Antibacterial,   
Polymyxin-class   
Antibacterial,   
Corticosteroid                          Start:   
2021  
End: 2021                                     Neomycin-Polymyxin-HC   
[id number] Otic   
Suspension 2021 -   
2021 Provider: Doreen Cabrera CNP  
   
                                                    ketorolac   
tromethamine 10 mg   
oral tablet  
(20 sources)                            Nonsteroidal   
Anti-inflammatory   
Drug, Cyclooxygenase   
Inhibitor                               Start:   
2022  
End: 2022                                     Ketorolac Tromethamine 10   
MG Oral Tablet 2022   
- 2022 Provider:   
Julieth Pisano CNP  
   
                                                    lamoTRIgine 100 mg   
oral tablet  
(20 sources)                            Mood Stabilizer,   
Anti-epileptic Agent                    Start:   
2024  
End: 2024                                     lamoTRIgine 100 MG Oral   
Tablet 2024 -   
2024 Provider: Doreen Cabrera CNP  
   
                                                    metFORMIN   
hydrochloride 500 mg   
oral tablet  
(20 sources)              Biguanide                 Start:   
07-  
End: 2024                                     metFORMIN HCl 500 MG Oral   
Tablet 07/15/2023 -   
2024 Provider:  
  
  
  
                                                    Start: 2021  
End: 10-                         take 1 tablet by mouth every   
twenty-four hours                       metFORMIN HCl  MG Oral   
Tablet Extended Release 24 Hour   
2021 - 10/21/2022 Provider:   
Doreen Cabrera CNP  
  
  
  
                                                    methylPREDNISolone 4 mg   
oral tablet  
(20 sources)              Corticosteroid            Start:   
2021  
End: 2021                                     methylPREDNISolone 4 MG   
Oral Tablet Therapy   
Pack 2021 -   
2021 Provider:  
   
                                                    prazosin 1 mg oral   
capsule  
(20 sources)                            alpha-Adrenergic   
Blocker                                 Start:   
2023  
End: 2024                                     Prazosin HCl 1 MG Oral   
Capsule 2024 -   
2024 Provider:   
Doreen Cabrera CNP  
  
  
  
                                                    Start: 2022  
End: 2023                                     Prazosin HCl 1 MG Oral Capsu  
le   
2022 - 2023 Provider:   
Doreen Cabrera CNP  
   
                                        Start: 2021   take 3 capsules by m  
outh once   
daily                                   prazosin (MINIPRESS) 1 MG capsule   
Take 3 capsules by mouth nightly   
90 capsule 0 2021 Active  
   
                                                    Start: 2021  
End: 10-                                     Prazosin HCl 1 MG Oral Capsu  
le   
06/10/2021 - 2021 Provider:   
Doreen Cabrera CNP  
  
  
  
                                                    promethazine   
hydrochloride 12.5 mg   
oral tablet  
(20 sources)              Phenothiazine             Start: 2022  
End: 2023                                     Promethazine HCl 12.5   
MG Oral Tablet   
2022 -   
2023 Provider:   
Julieth Pisano CNP  
   
                                                    sertraline 50 mg oral   
tablet  
(20 sources)                            Serotonin Reuptake   
Inhibitor                               Start: 2024  
End: 2024                                     Sertraline HCl 50 MG   
Oral Tablet   
2024 -   
2024 Provider:   
Doreen Cabrera CNP  
  
  
  
                                                    Start: 2021  
End: 2021                                     Zoloft 50 MG Oral Tablet 2021 - 2021 Provider:  
  
  
  
                                                    traZODone hydrochloride   
100 mg oral tablet  
(20 sources)                            Serotonin Reuptake   
Inhibitor                               Start: 2023  
End: 2024                                     traZODone HCl 100 MG   
Oral Tablet   
2024 -   
2024 Provider:   
Doreen Cabrera CNP  
  
  
  
                                                    Start: 2021  
End: 10-                                     traZODone HCl 50 MG Oral Tab  
let 2021 - 2021   
Provider:  
  
  
  
                                                    ziprasidone 80 mg oral   
capsule  
(20 sources)              Atypical Antipsychotic    Start: 2021  
End: 10-                                     Geodon 80 MG Oral   
Capsule 2021 -   
2021 Provider:  
  
  
  
                                                    Start: 2021  
End: 2021                                     Geodon 20 MG Oral Capsule   
2021 - 2021 Provider:  
   
                                                            take 4 capsules by m  
outh once   
daily                                   ziprasidone (GEODON) 20 MG capsule   
Take 80 mg by mouth nightly 0   
Active  
  
  
  
Problems  
Active Problems  
  
  
                                                    Problem   
Classification  Problem         Date            Documented Date Episodic/Chronic  
   
                                                    Anxiety disorders  
(20 sources)                            Posttraumatic stress   
disorder;   
Translations:   
[Post-traumatic stress   
disorder, unspecified]                  Onset:   
2021                                          Chronic  
   
                                                    Attention-deficit,   
conduct, and   
disruptive behavior   
disorders  
(20 sources)                            Attention deficit   
hyperactivity   
disorder;   
Translations:   
[Attention-deficit   
hyperactivity   
disorder, unspecified   
type]                                   Onset:   
2024                Chronic  
   
                                                    Attention-deficit,   
conduct, and   
disruptive behavior   
disorders  
(12 sources)                            Undifferentiated   
attention deficit   
disorder;   
Translations:   
[Attention deficit   
disorder with   
hyperactivity]                          Onset:   
2024                                          Chronic  
   
                                                    Deficiency and other   
anemia  
(3 sources)                             Iron deficiency   
anemia; Translations:   
[Iron deficiency   
anemia, unspecified]                    Onset:   
10-                                          Episodic  
   
                                                    Diabetes mellitus   
without complication  
(19 sources)                            Type 2 diabetes   
mellitus without   
complication;   
Translations: [Type 2   
diabetes mellitus   
without complications]                  Onset:   
2024                Chronic  
   
                                                    Disorders of teeth   
and jaw  
(5 sources)                             Dental caries;   
Translations: [Dental   
caries, unspecified]                    Onset:   
2024                                          Episodic  
   
                                                    Essential   
hypertension  
(16 sources)                            Hypertensive disorder;   
Translations:   
[Unspecified essential   
hypertension]                           Onset:   
2021                                          Chronic  
   
                                                    Immunizations and   
screening for   
infectious disease  
(20 sources)                            Contact with and   
(suspected) exposure   
to other viral   
communicable diseases;   
Translations: [HIV   
screening]                              Onset:   
08-                                          Episodic  
   
                                                    Impulse control   
disorders, NEC  
(1 source)                              Homicidal thoughts;   
Translations:   
[Homicidal ideations]                                         Episodic  
   
                                                    Menstrual disorders  
(14 sources)                            Dysmenorrhea;   
Translations:   
[Dysmenorrhea,   
unspecified]                            Onset:   
2024                Chronic  
   
                                                    Mood disorders  
(20 sources)                            Depressive disorder;   
Translations: [Major   
depressive disorder,   
single episode,   
unspecified]                            Onset:   
2021                                          Chronic  
   
                                                    Nonspecific chest   
pain  
(3 sources)                             Other chest pain;   
Translations: [Chest   
pain]                                   Onset:   
2024                                          Episodic  
   
                                                    Other ear and sense   
organ disorders  
(7 sources)                             Otitis externa;   
Translations:   
[Infective otitis   
externa, unspecified]                   Onset:   
2021                                          Chronic  
   
                                                    Other lower   
respiratory disease  
(6 sources)                             Cough; Translations:   
[Cough]                                 Onset:   
2022                                          Episodic  
   
                                                    Other nutritional;   
endocrine; and   
metabolic disorders  
(20 sources)                            Finding of body mass   
index; Translations:   
[Body mass index   
(observable entity)]                    Onset:   
2021                                          Chronic  
   
                                                    Other nutritional;   
endocrine; and   
metabolic disorders  
(20 sources)                            Morbid obesity;   
Translations: [Morbid   
obesity]                                Onset:   
2021                                          Chronic  
   
                                                    Personality   
disorders  
(20 sources)                            Borderline personality   
disorder;   
Translations:   
[Borderline   
personality disorder]                   Onset:   
2021  
Resolved:   
2024                                          Chronic  
   
                                                    Substance-related   
disorders  
(20 sources)                            Tobacco dependence   
syndrome;   
Translations:   
[Nicotine dependence,   
unspecified,   
uncomplicated]                          Onset:   
2021  
Resolved:   
2023                                          Chronic  
  
  
Past or Other Problems  
  
  
                      Problem Classification Problem    Date       Documented Da  
te Episodic/Chronic  
   
                                                    Cardiac dysrhythmias  
(10 sources)                            Tachycardia;   
Translations:   
[Tachycardia,   
unspecified]                            Onset:   
2021                                          Episodic  
   
                                                    Conditions associated   
with dizziness or   
vertigo  
(5 sources)                             Dizziness;   
Translations:   
[Dizziness and   
giddiness]                              Onset:   
2017                Episodic  
   
                                                    Deficiency and other   
anemia  
(9 sources)                             Anemia;   
Translations:   
[Anemia,   
unspecified]                            Onset:   
2024                                          Episodic  
   
                                                    Other screening for   
suspected conditions   
(not mental disorders   
or infectious disease)  
(20 sources)                            Encounter for   
screening for   
diabetes mellitus;   
Translations:   
[Diabetes Risk Test   
Score]                                  Onset:   
2021                                          Episodic  
   
                                                    Otitis media and   
related conditions  
(5 sources)                             Acute suppurative   
otitis media   
without spontaneous   
rupture of ear   
drum; Translations:   
[Acute suppurative   
otitis media   
without spontaneous   
rupture of ear   
drum, right ear]                        Onset:   
2017                Episodic  
  
  
  
Results  
  
  
                          Test Name    Value        Interpretation Reference   
Range                                   Facility  
   
                                                    Laboratory - Chemistry and C  
hemistry - challengeon 2024   
   
                      Ferritin [Mass/Vol] 118 ng/mL             ( )  [facility]  
h   
[facility]  
   
                      Free T3 [Mass/Vol] 3.2 pg/mL             (2.0-4.4 ) [facility]  
   
                          Free T4 [Mass/Vol] 1.13 ng/dL                (0.82-1.7  
7   
)                                       [facility]  
   
                      Iron [Mass/Vol] 52 ug/dL              ( )  [facility]  
   
                                                    Iron binding capacity   
[Mass/Vol]      396 ug/dL                       (250-450 )      [facility]  
   
                                                    Iron binding   
capacity.unsaturated   
[Mass/Vol]      344 ug/dL                       (131-425 )      [facility]  
   
                                                    Iron saturation [Mass   
fraction]       13 %            Low             (15-55 )        [facility]  
   
                          TSH Qn       2.930 uIU/mL              (0.450-4.50  
0 )                                     [facility]  
   
                                                    Laboratory - Hematology and   
Cell countson 2024   
   
                      Basophils (Bld) [#/Vol] 0.1 10*3/uL            (0.0-0.2 )   
[facility]  
   
                          Basophils/100 WBC (Bld) 0 %                       (Not  
 Estab.   
)                                       [facility]  
   
                                                    Eosinophils (Bld)   
[#/Vol]         0.2 10*3/uL                     (0.0-0.4 )      [facility]  
   
                                                    Eosinophils/100 WBC   
(Bld)               1 %                                     (Not Estab.   
)                                       [facility]  
   
                                                    Erythrocyte   
distribution width   
(RBC) [Ratio]       15.7 %              High                (11.7-15.4   
)                                       [facility]  
   
                                                    Hematocrit (Bld)   
[Volume fraction]   41.3 %                                  (34.0-46.6   
)                                       [facility]  
   
                                                    Hemoglobin (Bld)   
[Mass/Vol]          12.9 g/dL                               (11.1-15.9   
)                                       [facility]   
[facility]   
[facility] [place]  
   
                                                    Immature granulocytes   
(Bld) [#/Vol]   0.1 10*3/uL                     (0.0-0.1 )      [facility]   
[facility]   
[facility] [place]  
   
                                                    Immature   
granulocytes/100 WBC   
(Bld)               1 %                                     (Not Estab.   
)                                       [facility]   
[facility]   
[facility] [place]  
   
                                                    Lymphocytes (Bld)   
[#/Vol]         2.4 10*3/uL                     (0.7-3.1 )      [facility]   
[facility]   
[facility] [place]  
   
                                                    Lymphocytes/100 WBC   
(Bld)               18 %                                    (Not Estab.   
)                                       [facility]   
[facility]   
[facility] [place]  
   
                                                    MCH (RBC) [Entitic   
mass]               24.3 pg             Low                 (26.6-33.0   
)                                       [facility]   
[facility]   
[facility] [place]  
   
                          MCHC (RBC) [Mass/Vol] 31.2 g/dL    Low          (31.5-  
35.7   
)                                       [facility]  
   
                      MCV (RBC) [Entitic vol] 78 fL      Low        (79-97 )   H  
[facility]alt   
[facility]   
[facility] [place]  
   
                      Monocytes (Bld) [#/Vol] 0.6 10*3/uL            (0.1-0.9 )   
[facility]   
[facility]   
[facility] [place]  
   
                          Monocytes/100 WBC (Bld) 5 %                       (Not  
 Estab.   
)                                       [facility]  
   
                                                    Morphology Gagandeep (Bld)   
[Interp]        NP                                              [facility]  
   
                                                    Neutrophils (Bld)   
[#/Vol]         9.7 10*3/uL     High            (1.4-7.0 )      [facility]  
   
                                                    Neutrophils/100 WBC   
(Bld)               75 %                                    (Not Estab.   
)                                       [facility]  
   
                                                    Nucleated RBC/100 WBC   
(Bld) [Ratio]   NP                                              [facility]   
[facility]   
[facility] [place]  
   
                      Platelets (Bld) [#/Vol] 308 10*3/uL            (150-450 )   
[facility]   
[facility]   
[facility] [place]  
   
                          RBC (Bld) [#/Vol] 5.31 10*6/uL High         (3.77-5.28  
   
)                                       [facility]  
   
                      WBC (Bld) [#/Vol] 13.1 10*3/uL High       (3.4-10.8 ) [facility]  
   
                                                    Laboratory - Serology - non-  
microon 2024   
   
                      Thyroglobulin Ab Qn [IU]/mL               (0.0-0.9 ) [facility]  
[facility]   
[place]  
   
                                        Comment on above:   Note: Thyroglobulin   
Antibody measured by J[facility]   
[facility] .It should be noted that the presence of   
thyroglobulinantibodies may not be pathogenic nor diagnostic,   
especiallyat very low levels. The assay  has found   
thatfour percent of individuals without evidence of   
thyroiddisease or autoimmunity will have positive TgAb levels   
upto 4 IU/mL.   
   
                      TPO Ab Qn  14 [IU]/mL            (0-34 )    [facility]  
   
                                                    No Panel Informationon    
   
                      Immature Cells NP                               [facility]  
   
                      Reported Physicians See Note                         [facility]  
   
                                        Comment on above:   Note: Reported Physi  
cians:Ordering: Doreen Cabrera   
   
                                                    Troponin I.cardiac High sens  
itivity method [Mass/Vol]on 2024   
   
                                                    1 HOUR TROP I, HIGH   
SENSITIVITY     <2              Normal          <16             [facility]  
   
                                        Comment on above:   Performed By: #### 8  
9579-7 ####  
[facility] ([id number])  
[address] [place] [address]   
   
                                                    BASIC METABOLIC PANLon    
   
                      Anion gap [Moles/Vol] 7 mmol/L   Normal     5-15       [facility]  
   
                                        Comment on above:   Performed By: #### C  
BCA, BMP, 76049-2, 90413-7, 74580-0 ####  
[facility] ([id number])  
[address] [place] [address]   
   
                      Calcium [Mass/Vol] 8.9 mg/dL  Normal     8.5-10.5   [facility]  
   
                                        Comment on above:   Performed By: #### C  
BCA, BMP, 50583-9, 65175-7, 51915-8 ####  
[facility] ([id number])  
[address] [place] [address]   
   
                      Chloride [Moles/Vol] 101 mmol/L Normal          [facility]  
   
                                        Comment on above:   Performed By: #### C  
BCA, BMP, 62277-3, 94125-8, 67425-8 ####  
[facility] ([id number])  
[address] [place] [address]   
   
                      CO2 [Moles/Vol] 28 mmol/L  Normal     22-32      [facility]  
   
                                        Comment on above:   Performed By: #### C  
REBECCA, BMP, 74605-7, 88537-6, 93001-9 ####  
[facility] ([id number])  
[address] [place] [address]   
   
                      Creatinine [Mass/Vol] 0.91 mg/dL Normal     0.40-1.00  [facility]  
   
                                        Comment on above:   Result Comment: METH  
OD TRACEABLE TO IDMS STANDARD   
   
                                                            Performed By: #### C  
REBECCA, BISHOP, , 26582-7, 11151-8 ####  
[facility] ([id number])  
[address] [place] [address]   
   
                                                    eGFR (CKD-EPI) NON-RACE   
DEPENDENT       >90             Normal          >59             [facility]  
   
                                        Comment on above:   Result Comment:  
Reported eGFR is based on the  
CKD-EPI  equation that does  
not use a race coefficient.   
   
                                                            Performed By: #### C  
REBECCA, BISHOP, , 57284-2, 91461-7 ####  
[facility] ([id number])  
[address] [place] [address]   
   
                      Glucose [Mass/Vol] 107 mg/dL  High       65-99      [facility]  
   
                                        Comment on above:   Performed By: #### C  
REBECCA, BMP, , 99515-0, 87940-6 ####  
[facility] ([id number])  
[address] [place] [address]   
   
                      Potassium [Moles/Vol] 3.5 mmol/L Normal     3.5-5.0    [facility]  
   
                                        Comment on above:   Performed By: #### C  
REBECCA, BMP, 81237-1, 08546-2, 57063-1 ####  
[facility] ([id number])  
[address] [place] [address]   
   
                      Sodium [Moles/Vol] 136 mmol/L Normal     134-146    [facility]  
   
                                        Comment on above:   Performed By: #### C  
BCA, BMP, 97251-6, 92746-6, 84161-7 ####  
[facility] ([id number])  
[address] [place] [address]   
   
                                                    Urea nitrogen   
[Mass/Vol]      10 mg/dL        Normal          5-23            [facility]  
   
                                        Comment on above:   Performed By: #### C  
BCA, BMP, , 84870-6, 55791-9 ####  
[facility] ([id number])  
[address] [place] [address]   
   
                                                    CBC AND AUTO DIFFon 20  
24   
   
                      ABSOLUTE BASOPHIL 0.2 X10E9/L Normal     0.0-0.2    [facility]  
   
                                        Comment on above:   Performed By: #### C  
BCA, BMP, , 74753-1, 08744-8 ####  
[facility] ([id number])  
[address] [place] [address]   
   
                      ABSOLUTE NEUTROPHIL 10.7 X10E9/L High       1.5-6.6    [facility]  
   
                                        Comment on above:   Performed By: #### C  
BCA, BMP, , 30635-0, 76015-4 ####  
[facility] ([id number])  
[address] [place] [address]   
   
                      Basophils/100 WBC (Bld) 1.1 %      Normal                [facility]  
   
                                        Comment on above:   Performed By: #### C  
BCA, BMP, , 77826-0, 81090-5 ####  
[facility] ([id number])  
[address] [place] [address]   
   
                                                    Eosinophils (Bld)   
[#/Vol]         0.2 10*3/uL     Normal          0.0-0.4         [facility]  
   
                                        Comment on above:   Performed By: #### C  
BCA, BMP, , 62187-9, 85090-2 ####  
[facility] ([id number])  
[address] [place] [address]   
   
                                                    Eosinophils/100 WBC   
(Bld)           1.3 %           Normal                          [facility]  
   
                                        Comment on above:   Performed By: #### C  
BCA, BMP, 98847-7, 98912-5, 42246-7 ####  
[facility] ([id number])  
[address] [place] [address]   
   
                                                    Erythrocyte   
distribution width   
(RBC) [Ratio]   19.4 %          High            11.5-15.0       [facility]  
   
                                        Comment on above:   Performed By: #### C  
BCA, BMP, 69647-1, 67612-3, 02236-2 ####  
[facility] ([id number])  
[address] [place] [address]   
   
                                                    Hematocrit (Bld)   
[Volume fraction] 33.5 %          Low             35-47           [facility]  
   
                                        Comment on above:   Performed By: #### C  
BCA, BMP, , 24738-8, 60121-2 ####  
[facility] ([id number])  
[address] [place] [address]   
   
                                                    Hemoglobin (Bld)   
[Mass/Vol]      10.6 g/dL       Low             11.7-15.5       [facility]  
   
                                        Comment on above:   Performed By: #### C  
BCA, BMP, , 39451-6, 73644-2 ####  
[facility] ([id number])  
[address] [place] [address]   
   
                                                    Lymphocytes (Bld)   
[#/Vol]         2.4 10*3/uL     Normal          1.0-3.5         [facility]  
   
                                        Comment on above:   Performed By: #### C  
BCA, BMP, 32778-0, 31703-2, 14682-9 ####  
[facility] ([id number])  
[address] [place] [address]   
   
                                                    Lymphocytes/100 WBC   
(Bld)           17.2 %          Normal                          [facility]  
   
                                        Comment on above:   Performed By: #### C  
BCA, BMP, 22751-2, 97396-5, 26491-3 ####  
[facility] ([id number])  
[address] [place] [address]   
   
                                                    MCH (RBC) [Entitic   
mass]           21.7 pg         Low             27-34           [facility]  
   
                                        Comment on above:   Performed By: #### C  
BCA, BMP, 25985-8, 07457-0, 69252-6 ####  
[facility] ([id number])  
[address] [place] [address]   
   
                      MCHC (RBC) [Mass/Vol] 31.6 g/dL  Low        32-36      [facility]  
   
                                        Comment on above:   Performed By: #### DAVID  
BCA, BMP, 71598-0, 96509-7, 08032-1 ####  
[facility] ([id number])  
[address] [place] [address]   
   
                      MCV (RBC) [Entitic vol] 69 fL      Low             P  
[facility]  
   
                                        Comment on above:   Performed By: #### C  
BCA, BMP, , 23615-6, 00646-2 ####  
[facility] ([id number])  
[address] [place] [address]   
   
                      Monocytes (Bld) [#/Vol] 0.7 10*3/uL Normal     0-0.9        
[facility]  
   
                                        Comment on above:   Performed By: #### C  
BCA, BMP, 73593-2, 43285-7, 28156-9 ####  
[facility] ([id number])  
[address] [place] [address]   
   
                      Monocytes/100 WBC (Bld) 4.8 %      Normal                P  
[facility]  
   
                                        Comment on above:   Performed By: #### DAVID  
BCA, BMP, 61558-9, 32924-6, 93004-0 ####  
[facility] ([id number])  
[address] [place] [address]   
   
                                                    Neutrophils/100 WBC   
(Bld)           75.6 %          Normal                          [facility]  
   
                                        Comment on above:   Performed By: #### C  
REBECCA, BMP, 76527-9, 00772-3, 07956-4 ####  
[facility] ([id number])  
[address] [place] [address]   
   
                                                    Platelet mean volume   
(Bld) [Entitic vol] 6.6 fL          Low             7-12            [facility]  
   
                                        Comment on above:   Performed By: #### DAVID FERNANDEZ, BMP, 36059-9, 15570-7, 70016-0 ####  
[facility] ([id number])  
[address] [place] [address]   
   
                      Platelets (Bld) [#/Vol] 333 10*3/uL Normal     150-450      
[facility]  
   
                                        Comment on above:   Performed By: #### DAVID FERNANDEZ, BMP, 99198-1, 27521-2, 52125-1 ####  
[facility] ([id number])  
[address] [place] [address]   
   
                      RBC COUNT  4.88 X10E12/L Normal     3.80-5.20  [facility]  
   
                                        Comment on above:   Performed By: #### DAVID FERNANDEZ, BMP, 04944-6, 99101-7, 16563-1 ####  
[facility] ([id number])  
[address] [place] [address]   
   
                                                    RBC morphology finding   
Nom (Bld)       REVIEWED        Normal                          [facility]  
   
                                        Comment on above:   Performed By: #### DAVID  
BCA, BMP, 06552-7, 67880-1, 12250-8 ####  
[facility] ([id number])  
[address] [place] [address]   
   
                      WBC (Bld) [#/Vol] 14.1 10*3/uL High       4.0-11.0   [facility]  
   
                                        Comment on above:   Performed By: #### DAVID FERNANDEZ, BMP, 47482-8, 32227-2, 88100-7 ####  
[facility] ([id number])  
[address] [place] [address]   
   
                                                    Fibrin D-dimer DDU (PPP) [Ma  
ss/Vol]on 2024   
   
                      D DIMER    <150       Normal     <255       [facility]  
   
                                        Comment on above:   Result Comment:  
Results <255 ng/mL DDU: The presence of a  
VTE can safely be excluded with a negative  
D-Dimer result and Wells score. A negative  
result doesn't exclude the possibility of DIC.  
The test be repeated along with other  
diagnostic tests if the patient's symptoms  
persist or worsen.  
[email or web address].com/dv/dl.[email or web address]  
[email or web address]=acaea   
   
                                                            Performed By: #### C  
REBECCA BMP, , 60552-8, 00975-0 ####  
[facility] ([id number])  
[address] [place] [address]   
   
                                                    MAGNESIUMon 2024   
   
                      Magnesium [Mass/Vol] 1.8 mg/dL  Normal     1.8-2.6    [facility]  
   
                                        Comment on above:   Performed By: #### C  
REBECCA BMP, , 23154-0, 79883-5 ####  
[facility] ([id number])  
[address] [place] [address]   
   
                                                    Troponin I.cardiac High sens  
itivity method [Mass/Vol]on 2024   
   
                                                    TROPONIN I, HIGH   
SENSITIVITY     <2              Normal          <16             [facility]  
   
                                        Comment on above:   Performed By: #### C  
REBECCA, BMP, , 05450-2, 58519-0 ####  
[facility] ([id number])  
[address] [place] [address]   
   
                                                    XR CHEST 2 VWSon 2024   
   
                                        XR CHEST 2 VWS      XR CHEST 2 VWS  
XR CHEST 2 VWS  
History: . chest   
discomfort.  
Comparison: 2020  
Impression:  
No acute pulmonary   
process. No pneumothorax   
or pleural effusion.  
Nonenlarged heart.  
Workstation:[id number]  
Finalized by Dejon Khan MD on 2024   
9:49 PM             Normal                                  [facility]  
   
                                                    Chlamydia/GC DNA, Uron 10-24  
-2022   
   
                      Chlamydia Probe, Ur Negative   Normal     NEG        [facility]  
   
                                        Comment on above:   Result Comment: CHLA  
MYDIA TRACHOMATIS DNA not detected by   
nucleic acid amplification.  
This test is intended for medical purposes only and is not   
valid for the  
evaluation of suspected sexual abuse or for other forensic   
purposes.  
In certain contexts, culture may be required to meet   
applicable laws and  
regulations for diagnosis of C. trachomatis and N. gonorrhoeae   
infections.  
Per 2014 CDC recommendations, this test does not include   
confirmation of  
positive results by an alternative nucleic acid target.   
   
                                                            Performed By: #### T  
RCMOL, UC ####  
[facility]  
[address] [place], [place] [address] [phone]  
: Carson Santizo MD   
   
                      Gonorrhea Probe, Ur Negative   Normal     NEG        [facility]  
   
                                        Comment on above:   Result Comment: NEIS  
SERIA GONORRHOEAE DNA not detected by   
nucleic acid amplification.  
This test is intended for medical purposes only and is not   
valid for the  
evaluation of suspected sexual abuse or for other forensic   
purposes.  
In certain contexts, culture may be required to meet   
applicable laws and  
regulations for diagnosis of C. trachomatis and N. gonorrhoeae   
infections.  
Per 2014 CDC recommendations, this test does not include   
confirmation of  
positive results by an alternative nucleic acid target.   
   
                                                            Performed By: #### T  
RCMOL, UC ####  
[facility]  
[address] [place], [place] [address] [phone]  
: Carson Santizo MD   
   
                                                    Trich Vag, Molecularon 10-22  
-2022   
   
                      Trich Vag, Molecular Negative   Normal     NEG        [facility]  
   
                                        Comment on above:   Result Comment: T. v  
aginalis DNA not detected  
Results should be interpreted in conjunction with other   
clinical data.  
This test is intended for medical purposes only and is not   
valid for the  
evaluation of suspected sexual abuse or for other forensic   
purposes.  
This test has not been evaluated in pregnant women or in   
patients less than 16  
years of age.   
   
                                                            Performed By: #### T  
RCMOL, UCGP ####  
[facility]  
[address] [place] [address]  
[phone]  
: Carson Santizo MD   
   
                      Source:    .URINE     Normal                [facility]  
   
                                        Comment on above:   Performed By: #### T  
RCMOL, UCGP ####  
[facility]  
[address] [place] [address]  
[phone]  
: Carson Santizo MD   
   
                                                    Acetaminophenon 2021   
   
                                                    Acetaminophen   
[Mass/Vol]      ug/mL           Low             10-30           [facility]  
   
                                        Comment on above:   Performed By: #### C  
OVRB ####  
[facility]  
[address]   
[place] [address] [phone]  
: Haresh Zimmer MD   
   
                                                    Acetaminophen LevelOrdered B  
y: Seth Greteljanice on 2021   
   
                          Acetaminophen Level <5           Low          10 - 30   
ug/mL                                   [facility]   
Phone:   
[phone]  
   
                                                    Interpretation and   
review of laboratory   
results         Abnormal                                        [facility]   
Phone:   
[phone]  
   
                                                                  [facility]   
Phone:   
[phone]  
   
                                                    CBC Auto DifferentialOrdered  
 By: Seth Rahman on 2021   
   
                      Absolute Eos # 0.62       High                  [facility]   
Phone:   
[phone]  
   
                                                    Absolute Immature   
Granulocyte     0.07                                            [facility]   
Phone:   
[phone]  
   
                      Absolute Lymph # 3.20                             [facility]   
Phone:   
[phone]  
   
                      Absolute Mono # 0.89                             [facility]   
Phone:   
[phone]  
   
                      Basophils (Bld) [#/Vol] 0.10 10*3/uL                        
 [facility]   
Phone:   
[phone]  
   
                      Basophils/100 WBC (Bld) 1 %                   0 - 2 %    M  
[facility]   
Phone:   
[phone]  
   
                      Differential Type NOT REPORTED                       [facility]   
Phone:   
[phone]  
   
                                                    Eosinophils/100 WBC   
(Bld)           5 %             High            1 - 4 %         [facility]   
Phone:   
[phone]  
   
                                                    Hematocrit (Bld)   
[Volume fraction]   43.4 %                                  36.3 - 47.1   
%                                       [facility]   
Phone:   
[phone]  
   
                                                    Hemoglobin.gastrointest  
inal spec 1 Ql (Stl) 14.6 g/dL                               11.9 - 15.1   
g/dL                                    [facility]   
Phone:   
[phone]  
   
                                                    Immature   
granulocytes/100 WBC   
(Bld)           1 %             High            0               [facility]   
Phone:   
[phone]  
   
                                                    Interpretation and   
review of laboratory   
results         Abnormal                                        [facility]   
Phone:   
[phone]  
   
                                                    Lymphocytes/100 WBC   
(Bld)           26 %                            24 - 43 %       [facility]   
Phone:   
[phone]  
   
                                                    MCH (RBC) [Entitic   
mass]               28.3 pg                                 25.2 - 33.5   
pg                                      [facility]   
Phone:   
[phone]  
   
                          MCHC (RBC) [Mass/Vol] 33.6 g/dL                 28.4 -  
 34.8   
g/dL                                    [facility]   
Phone:   
[phone]  
   
                          MCV (RBC) [Entitic vol] 84.3 fL                   82.6  
 -   
102.9 fL                                [facility]   
Phone:   
[phone]  
   
                      Monocytes/100 WBC (Bld) 7 %                   3 - 12 %   M  
[facility]   
Phone:   
[phone]  
   
                          NRBC Automated 0.0                       0.0 per 100   
WBC                                     [facility]   
Phone:   
[phone]  
   
                                                    Platelet distribution   
width (Bld) [Ratio] 12.3 %                                  11.8 - 14.4   
%                                       [facility]   
Phone:   
[phone]  
   
                      Platelet Estimate NOT REPORTED                       [facility]   
Phone:   
[phone]  
   
                                                    Platelet mean volume   
(Bld) [Entitic vol] 8.7 fL                                  8.1 - 13.5   
fL                                      [facility]   
Phone:   
[phone]  
   
                      Platelets (Bld) [#/Vol] 273 10*3/uL                         
[facility]   
Phone:   
[phone]  
   
                          RBC (Bld) [#/Vol] 5.15 10*6/uL High         3.95 - 5.1  
1   
m/uL                                    [facility]   
Phone:   
[phone]  
   
                      RBC (Bld) [#/Vol] NOT REPORTED                       [facility]   
Phone:   
[phone]  
   
                                                    Segmented   
neutrophils/100 WBC   
(Bld)           60 %                            36 - 65 %       [facility]   
Phone:   
[phone]  
   
                      Segs Absolute 7.61                             [facility]   
Phone:   
[phone]  
   
                      WBC (Bld) [#/Vol] 12.5 10*3/uL High                  [facility]   
Phone:   
[phone]  
   
                      WBC (Bld) [#/Vol] NOT REPORTED                       [facility]   
Phone:   
[phone]  
   
                                                                  [facility]   
Phone:   
[phone]  
   
                                                    CBC with Diffon 2021   
   
                      Abs. Basophil 0.10 k/uL  Normal     0.00-0.20  [facility]  
   
                                        Comment on above:   Performed By: #### D  
AU ####  
[facility]  
[address] [place] [address] [address] [place], [facility]83 [phone]  
: Haresh Zimmer MD   
   
                      Abs.Imm.Granulocyte 0.07 k/uL  Normal     0.00-0.30  [facility]  
   
                                        Comment on above:   Performed By: #### D  
AU ####  
[address] [place] [address]  
[phone]  
: Haresh Zimmer MD   
   
                      Abs.Neutrophil (Seg) 7.61 k/uL  Normal     1.50-8.10  [facility]  
   
                                        Comment on above:   Performed By: #### D  
AU ####  
[address] [place], [place] [address] [phone]  
: Haresh Zimmer MD   
   
                      Basophils/100 WBC (Bld) 1 %        Normal     0-2        [facility]  
   
                                        Comment on above:   Performed By: #### D  
AU ####  
[address] [place], [place] [address] [phone]  
: Haresh Zimmer MD   
   
                                                    Eosinophils (Bld)   
[#/Vol]         0.62 10*3/uL    High            0.00-0.44       [facility]  
   
                                        Comment on above:   Performed By: #### D  
AU ####  
[facility]  
[address] [place], [facility]83 [phone]  
: Haresh Zimmer MD   
   
                                                    Eosinophils/100 WBC   
(Bld)           5 %             High            1-4             [facility]  
   
                                        Comment on above:   Performed By: #### D  
AU ####  
[facility]  
[address] [place], [place] [address]  
[phone]  
: Haresh Zimmer MD   
   
                                                    Erythrocyte   
distribution width   
(RBC) [Ratio]   12.3 %          Normal          11.8-14.4       [facility]  
   
                                        Comment on above:   Performed By: #### D  
AU ####  
[facility]  
[address] [place], [facility]83 [phone]  
: Haresh Zimmer MD   
   
                                                    Hematocrit (Bld)   
[Volume fraction] 43.4 %          Normal          36.3-47.1       [facility]  
   
                                        Comment on above:   Performed By: #### D  
AU ####  
[facility]  
[address] [place], [facility]83 [phone]  
: Haresh Zimmer MD   
   
                                                    Hemoglobin (Bld)   
[Mass/Vol]      14.6 g/dL       Normal          11.9-15.1       [facility]  
   
                                        Comment on above:   Performed By: #### D  
AU ####  
[address] [place], [facility]14 [phone]  
: Haresh Zimmer MD   
   
                                                    Immature   
granulocytes/100 WBC   
(Bld)           1 %             High            0               [facility]  
   
                                        Comment on above:   Performed By: #### D  
AU ####  
[facility]  
[address] [place], [facility]83 [phone]  
: Haresh Zimmer MD   
   
                                                    Lymphocytes (Bld)   
[#/Vol]         3.20 10*3/uL    Normal          1.10-3.70       [facility]  
   
                                        Comment on above:   Performed By: #### D  
AU ####  
[facility]  
[address] [place], [facility]83 [phone]  
: Haresh Zimmer MD   
   
                                                    Lymphocytes/100 WBC   
(Bld)           26 %            Normal          24-43           [facility]  
   
                                        Comment on above:   Performed By: #### D  
AU ####  
[facility]  
[address] [place] [address] [phone]  
: Haresh Zimmer MD   
   
                                                    MCH (RBC) [Entitic   
mass]           28.3 pg         Normal          25.2-33.5       [facility]  
   
                                        Comment on above:   Performed By: #### D  
AU ####  
[facility]  
[address] [place] [place] [address]  
[phone]  
: Haresh Zimmer MD   
   
                      MCHC (RBC) [Mass/Vol] 33.6 g/dL  Normal     28.4-34.8  [facility]  
   
                                        Comment on above:   Performed By: #### D  
AU ####  
[address] [place], [facility]83 [phone]  
: Haresh Zimmer MD   
   
                      MCV (RBC) [Entitic vol] 84.3 fL    Normal     82.6-102.9 [facility]  
   
                                        Comment on above:   Performed By: #### D  
AU ####  
[address] [place], [place] [address] [phone]  
: Haresh Zimmer MD   
   
                      Monocytes (Bld) [#/Vol] 0.89 10*3/uL Normal     0.10-1.20   
 [facility]  
   
                                        Comment on above:   Performed By: #### D  
AU ####  
[facility]  
[address] [place] [address] [address] [place], [place] [address] [phone]  
: Haresh Zimmer MD   
   
                      Monocytes/100 WBC (Bld) 7 %        Normal     3-12       M  
[facility]  
   
                                        Comment on above:   Performed By: #### D  
AU ####  
[facility]  
[address] [place] [address] [place] [place], [place] [address] [phone]  
: Haresh Zimmer MD   
   
                      Neutrophil (Seg) 60 %       Normal     36-65      [facility]  
   
                                        Comment on above:   Performed By: #### D  
AU ####  
[facility]  
[address] [place], [place] [address] [phone]  
: Haresh Zimmer MD   
   
                      NRBC Automated 0.0 per 100 WBC Normal     0.0        [facility]  
   
                                        Comment on above:   Performed By: #### D  
AU ####  
[facility]  
[address] [place], [place] [address] [phone]  
: Haresh Zimmer MD   
   
                                                    Platelet mean volume   
(Bld) [Entitic vol] 8.7 fL          Normal          8.1-13.5        [facility]  
   
                                        Comment on above:   Performed By: #### D  
AU ####  
[address] [place], [place] [address] [phone]  
: Haresh Zimmer MD   
   
                      Platelets (Bld) [#/Vol] 273 10*3/uL Normal     138-453      
[facility]  
   
                                        Comment on above:   Performed By: #### D  
AU ####  
[address] [place], [place] [address] [phone]  
: Haresh Zimmer MD   
   
                      RBC (Bld) [#/Vol] 5.15 10*6/uL High       3.95-5.11  [facility]  
   
                                        Comment on above:   Performed By: #### D  
AU ####  
[address] [place], [place] [address] [phone]  
: Haresh Zimmer MD   
   
                      WBC (Bld) [#/Vol] 12.5 10*3/uL High       3.5-11.3   [facility]  
   
                                        Comment on above:   Performed By: #### D  
AU ####  
[facility]  
[address] [place] [address] [place] [place], [place] [address] [phone]  
: Haresh Zimmer MD   
   
                      Auto Diff Performed NOT REPORTED Normal                [facility]  
   
                                        Comment on above:   Performed By: #### D  
AU ####  
[facility]  
[address] [place] [address] [address] [place], [facility]83 [phone]  
: Haresh Zimmer MD   
   
                      Platelet Estimate NOT REPORTED Normal                [facility]  
   
                                        Comment on above:   Performed By: #### D  
AU ####  
[facility]  
[address] [place] [address] [phone]  
: Haresh Zimmer MD   
   
                                                    RBC morphology finding   
Nom (Bld)       NOT REPORTED    Normal                          [facility]  
   
                                        Comment on above:   Performed By: #### D  
AU ####  
[facility]  
[address] [place] [address] [phone]  
: Haresh Zimmer MD   
   
                      WBC Morphology NOT REPORTED Normal                [facility]  
   
                                        Comment on above:   Performed By: #### D  
AU ####  
[facility]  
[address] [place] [address] [phone]  
: Haresh Zimmer MD   
   
                                                    COVID-19, RapidOrdered By: DAVID Rahman on 2021   
   
                                                    SARS-CoV-2 (COVID-19)   
RNA PAMELA+probe Ql (Unsp   
spec)               Not detected                            Not   
Detected                                [facility]   
Phone:   
[phone]  
   
                                        Comment on above:     
Rapid NAAT: The specimen is NEGATIVE for SARS-CoV-2, the novel   
coronavirus associated with  
COVID-19.  
  
The ID NOW COVID-19 assay is designed to detect the virus that   
causes COVID-19 in patients  
with signs and symptoms of infection who are suspected of   
COVID-19.  
An individual without symptoms of COVID-19 and who is not   
shedding SARS-CoV-2 virus would  
expect to have a negative (not detected) result in this assay.  
Negative results should be treated as presumptive and, if   
inconsistent with clinical signs  
and symptoms or necessary for patient management,  
should be tested with an alternative molecular assay. Negative   
results do not preclude  
SARS-CoV-2 infection and  
should not be used as the sole basis for patient management   
decisions.  
  
Fact sheet for Healthcare Providers:   
https://www.fda.gov/media/708635/download  
Fact sheet for Patients:   
https://www.fda.gov/media/492065/download  
  
Methodology: Isothermal Nucleic Acid Amplification  
  
   
   
                      Specimen Description .NASOPHARYNGEAL SWAB                   
      [facility]   
Phone:   
[phone]  
   
                                                                  [facility]   
Phone:   
[phone]  
   
                                                    Comp Metabolic Profon 2021   
   
                      (cont.)               Normal                [facility]  
   
                                        Comment on above:   Result Comment: Aver  
age GFR for 20-29 years old:  
116 mL/min/1.73sq m  
Chronic Kidney Disease:  
<60 mL/min/1.73sq m  
Kidney failure:  
<15 mL/min/1.73sq m  
eGFR calculated using average adult body mass. Additional eGFR   
calculator  
available at:  
http://[email or web address]/multiple_crcl_2012.htm   
   
                                                            Performed By: #### D  
AU ####  
[facility]  
[address] [place] [address] [place] [address] [phone]  
: Haresh Zimmer MD   
   
                      Albumin [Mass/Vol] 4.2 g/dL   Normal     3.5-5.2    [facility]  
   
                                        Comment on above:   Performed By: #### D  
AU ####  
[facility]  
[address] [place] [address] [phone]  
: Haresh Zimmer MD   
   
                      Albumin/Glob Ratio 1.4        Normal     1.0-2.5    [facility]  
   
                                        Comment on above:   Performed By: #### D  
AU ####  
[address] [place] [address] [phone]  
: Haresh Zimmer MD   
   
                      Alkaline Phos 74 U/L     Normal          [facility]  
   
                                        Comment on above:   Performed By: #### D  
AU ####  
[facility]  
[address] [place], [place] [address] [phone]  
: Haresh Zimmer MD   
   
                                                    ALT [Catalytic   
activity/Vol]   31 U/L          Normal          5-33            [facility]  
   
                                        Comment on above:   Performed By: #### D  
AU ####  
[facility]  
[address] [place] [address] [phone]  
: Haresh Zimmer MD   
   
                      Anion gap [Moles/Vol] 13 mmol/L  Normal     9-17       [facility]  
   
                                        Comment on above:   Performed By: #### D  
AU ####  
[facility]  
[address] [place], [place] [address] [phone]  
: Haresh Zimmer MD   
   
                                                    AST [Catalytic   
activity/Vol]   19 U/L          Normal          <32             [facility]  
   
                                        Comment on above:   Performed By: #### D  
AU ####  
[facility]  
[address] [place], [place] [address] [phone]  
: Haresh Zimmer MD   
   
                      Bilirubin [Mass/Vol] 0.17 mg/dL Low        0.3-1.2    [facility]  
   
                                        Comment on above:   Performed By: #### D  
AU ####  
[facility]  
[address] [place], [place] [address] [phone]  
: Haresh Zimmer MD   
   
                      BUN/CRE Ratio 7          Low        9-20       [facility]  
   
                                        Comment on above:   Performed By: #### D  
AU ####  
[facility]  
[address] [place], [place] [address] [phone]  
: Haresh Zimmer MD   
   
                      Calcium [Mass/Vol] 9.2 mg/dL  Normal     8.6-10.4   [facility]  
   
                                        Comment on above:   Performed By: #### D  
AU ####  
[facility]  
[address] [place] [address] [place] [address]  
[phone]  
: Haresh Zimmer MD   
   
                      Chloride [Moles/Vol] 104 mmol/L Normal          [facility]  
   
                                        Comment on above:   Performed By: #### D  
AU ####  
[facility]  
[address] [place], [place] [address] [phone]  
: Haresh Zimmer MD   
   
                      CO2 [Moles/Vol] 20 mmol/L  Normal     20-31      [facility]  
   
                                        Comment on above:   Performed By: #### D  
AU ####  
[facility]  
[address] [place] [address] [place] [place], [place] [address] [phone]  
: Haresh Zimmer MD   
   
                      Creatinine [Mass/Vol] 0.82 mg/dL Normal     0.50-0.90  [facility]  
   
                                        Comment on above:   Performed By: #### D  
AU ####  
[facility]  
[address] [place], [place] [address] [phone]  
: Haresh Zimmer MD   
   
                      GFR, Amer >60        Normal     >60        [facility]  
   
                                        Comment on above:   Performed By: #### D  
AU ####  
[facility]  
[address] [place], [place] [address] [phone]  
: Haresh Zimmer MD   
   
                      GFR,non  Amer >60        Normal     >60        [facility]  
   
                                        Comment on above:   Performed By: #### D  
AU ####  
[facility]  
[address] [place], [place] [address] [phone]  
: Haresh Zimmer MD   
   
                      Glucose [Mass/Vol] 100 mg/dL  High       70-99      [facility]  
   
                                        Comment on above:   Performed By: #### D  
AU ####  
[facility]  
[address] [place], [place] [address] [phone]  
: Haresh Zimmer MD   
   
                      Potassium [Moles/Vol] 4.0 mmol/L Normal     3.7-5.3    [facility]  
   
                                        Comment on above:   Performed By: #### D  
AU ####  
[facility]  
[address] [place] [address] [place] [address]  
[phone]  
: Haresh Zimmer MD   
   
                      Protein [Mass/Vol] 7.2 g/dL   Normal     6.4-8.3    [facility]  
   
                                        Comment on above:   Performed By: #### D  
AU ####  
[facility]  
[address] [place] [address]  
[phone]  
: Haresh Zimmer MD   
   
                      Sodium [Moles/Vol] 137 mmol/L Normal     135-144    [facility]  
   
                                        Comment on above:   Performed By: #### D  
AU ####  
[facility]  
[address] [place], [place] [address] [phone]  
: Haresh Zimmer MD   
   
                      Staging:              Normal                [facility]  
   
                                        Comment on above:   Result Comment: Stag  
e 1: Some kidney damage normal GFR  
Stage 2: Mild kidney damage GFR 60-89  
Stage 3: Moderate kidney damage GFR 30-59  
Stage 4: Severe kidney damage GFR 15-29  
Stage 5: Severe kidney damage GFR <15  
ESRD - chronic treatment by dialysis or transplant   
   
                                                            Performed By: #### D  
AU ####  
[facility]  
[address] [place] [address] [phone]  
: Haresh Zimmer MD   
   
                                                    Urea nitrogen   
[Mass/Vol]      6 mg/dL         Normal          6-20            [facility]  
   
                                        Comment on above:   Performed By: #### D  
AU ####  
[facility]  
[address] [place] [address] [phone]  
: Haresh Zimmer MD   
   
                                                    Comprehensive Metabolic Pane  
lOrdered By: Seth Rahman on 2021   
   
                          Albumin [Mass/Vol] 4.2 g/dL                  3.5 - 5.2  
   
g/dL                                    [facility]   
Phone:   
[phone]  
   
                                                    Albumin/Globulin [Mass   
ratio]          1.4 {ratio}                                     [facility]   
Phone:   
[phone]  
   
                                                    ALP (Bld) [Catalytic   
activity/Vol]       74 U/L                                  35 - 104   
U/L                                     [facility]   
Phone:   
[phone]  
   
                                                    ALT [Catalytic   
activity/Vol]   31 U/L                          5 - 33 U/L      [facility]   
Phone:   
[phone]  
   
                          Anion gap [Moles/Vol] 13 mmol/L                 9 - 17  
   
mmol/L                                  [facility]   
Phone:   
[phone]  
   
                                                    AST [Catalytic   
activity/Vol]   19 U/L                          <32             [facility]   
Phone:   
[phone]  
   
                          Bilirubin [Mass/Vol] 0.17 mg/dL   Low          0.3 - 1  
.2   
mg/dL                                   [facility]   
Phone:   
[phone]  
   
                          Calcium [Mass/Vol] 9.2 mg/dL                 8.6 - 10.  
4   
mg/dL                                   [facility]   
Phone:   
[phone]  
   
                          Chloride [Moles/Vol] 104 mmol/L                98 - 10  
7   
mmol/L                                  [facility]   
Phone:   
[phone]  
   
                          CO2 [Moles/Vol] 20 mmol/L                 20 - 31   
mmol/L                                  [facility]   
Phone:   
[phone]  
   
                          Creatinine [Mass/Vol] 0.82 mg/dL                0.50 -  
 0.90   
mg/dL                                   [facility]   
Phone:   
[phone]  
   
                                                    Free PSA/Total PSA   
[Mass fraction]     7.2 g/dL                                6.4 - 8.3   
g/dL                                    [facility]   
Phone:   
[phone]  
   
                      GFR  >60                   >60 mL/min [facility]  
y   
Health  
Work   
Phone:   
[phone]  
   
                                                    GFR Non-   
American        >60                             >60 mL/min      [facility]   
Phone:   
[phone]  
   
                          Glucose [Mass/Vol] 100 mg/dL    High         70 - 99   
mg/dL                                   [facility]   
Phone:   
[phone]  
   
                          Potassium [Moles/Vol] 4.0 mmol/L                3.7 -   
5.3   
mmol/L                                  [facility]   
Phone:   
[phone]  
   
                          Sodium [Moles/Vol] 137 mmol/L                135 - 144  
   
mmol/L                                  [facility]   
Phone:   
[phone]  
   
                                                    Urea nitrogen (BldV)   
[Mass/Vol]          6 mg/dL                                 6 - 20   
mg/dL                                   [facility]   
Phone:   
[phone]  
   
                                                    Urea   
nitrogen/Creatinine   
(Bld) [Mass ratio] 7               Low                             [facility]   
Phone:   
[phone]  
   
                                                    Drug Scr, Abuse, Uron 2021   
   
                      Amphetamine(s),Ur Negative   Normal     NEG        [facility]  
   
                                        Comment on above:   Performed By: #### D  
AU ####  
[facility]  
[address] [place] [address] [phone]  
: Haresh Zimmer MD   
   
                      Barbiturate(s),Ur Negative   Normal     NEG        [facility]  
   
                                        Comment on above:   Performed By: #### D  
AU ####  
[facility]  
[address] [place] [address] [phone]  
: Haresh Zimmer MD   
   
                      Benzodiazepine(s) Negative   Normal     NEG        [facility]  
   
                                        Comment on above:   Performed By: #### D  
AU ####  
[facility]  
[address] [place], [place] [address] [phone]  
: Haresh Zimmer MD   
   
                      Buprenorphrine, Ur Negative   Normal     [facility]  
   
                                        Comment on above:   Performed By: #### D  
AU ####  
[facility]  
[address] [place], [place] [address] [phone]  
: Haresh Zimmer MD   
   
                      Cannabinoid(s),Ur Positive   Abnormal   NEG        [facility]  
   
                                        Comment on above:   Performed By: #### D  
AU ####  
[facility]  
[address] [place], [place] [address] [phone]  
: Haresh Zimmer MD   
   
                      Cocaine Metabolite Negative   Normal     [facility]  
   
                                        Comment on above:   Performed By: #### D  
AU ####  
[facility]  
[address] [place], [facility] [phone]  
: Haresh Zimmer MD   
   
                      Methadone Ql (U) Negative   Normal     [facility]  
   
                                        Comment on above:   Performed By: #### D  
AU ####  
[facility]  
[address] [place], [facility] [phone]  
: Haresh Zimmer MD   
   
                      Methamphetamine, Ur Negative   Normal     [facility]  
   
                                        Comment on above:   Performed By: #### D  
AU ####  
[facility]  
[address] [place], [facility] [phone]  
: Haresh Zimmer MD   
   
                      Opiate(s), Ur Negative   Normal     [facility]  
   
                                        Comment on above:   Performed By: #### D  
AU ####  
[facility]  
[address] [place], [facility] [phone]  
: Haresh Zimmer MD   
   
                      Oxycodone, Urine Negative   Normal     [facility]  
   
                                        Comment on above:   Performed By: #### D  
AU ####  
[facility]  
[address] [place] [address] [phone]  
: Haresh Zimmer MD   
   
                      Phencyclidine, Ur Negative   Normal     NEG        [facility]  
   
                                        Comment on above:   Performed By: #### D  
AU ####  
[facility]  
[address] [place] [address] [phone]  
: Haresh Zimmer MD   
   
                      Propoxyphene,Urine Negative   Normal     NEG        [facility]  
   
                                        Comment on above:   Performed By: #### D  
AU ####  
[facility]  
[address] [place] [address] [phone]  
: Haresh Zimmer MD   
   
                                                    Tricyclic   
antidepressants Screen   
Ql (U)          Negative        Normal          NEG             [facility]  
   
                                        Comment on above:   Result Comment: Drug  
 screen results are to be used for medical  
  
purposes only. All positive  
results are unconfirmed. Testing for employment or legal uses   
should be sent  
to a reference laboratory for confirmation.   
   
                                                            Performed By: #### D  
AU ####  
[facility]  
[address] [place] [address] [place] [place], [place] [address] [phone]  
: Haresh Zimmer MD   
   
                      Interpretive Info NOT REPORTED Normal                [facility]  
   
                                        Comment on above:   Performed By: #### D  
AU ####  
[facility]  
[address] [place] [address] [place] [address] [phone]  
: Haresh Zimmer MD   
   
                      MDMA, Urine NOT REPORTED Normal     NEG        [facility]  
   
                                        Comment on above:   Performed By: #### D  
AU ####  
[facility]  
[address] [place] [address] [place] [address] [phone]  
: Haresh Zimmer MD   
   
                                                    EthanolOrdered By: Seth purvis on 2021   
   
                      Ethanol [Mass/Vol] mg/dL                 <10 mg/dL  [facility]   
Phone:   
[phone]  
   
                      Ethanol percent <0.010                <0.010 %   [facility]   
Phone:   
[phone]  
   
                                                                  [facility]   
Phone:   
[phone]  
   
                                                    Ethanol Alcoholon 2021  
   
   
                      Ethanol [Mass/Vol] mg/dL      Normal     <10        [facility]  
   
                                        Comment on above:   Performed By: #### C  
OVRB ####  
[facility]  
[address] [place] [address] [phone]  
: Haresh Zimmer MD   
   
                      Ethanol percent <0.010     Normal     <0.010     [facility]  
   
                                        Comment on above:   Performed By: #### C  
OVRB ####  
[facility]  
[address] [place] [address] [phone]  
: Haresh Zimmer MD   
   
                                                    HCG Qualitative, SerumOrdere  
d By: Seth Rahman on 2021   
   
                      hCG Qual   Negative              NEGATIVE   [facility]  
[employer]   
Phone:   
[phone]  
   
                                        Comment on above:   Specimens with hCG l  
evels near the threshold of the test (25   
mIU/mL) may give a negative or  
indeterminate result. In such cases, another test should be   
performed with a new specimen  
in 48-72 hours. If early pregnancy is suspected clinically in   
this setting, correlation  
with quantitative serum b-hCG level is suggested.  
  
[employer] has confirmed the use of plasma for this   
test. This has not been cleared  
or approved by the U.S. Food and Drug Administration. The FDA   
has determined that such  
clearance is not necessary.  
  
   
   
                                                                  [facility]   
Phone:   
[phone]  
   
                                                    HCG Screen, Bloodon 20  
21   
   
                      HCG Screen, Blood Negative   Normal     NEG        [facility]  
   
                                        Comment on above:   Result Comment: Spec  
imens with hCG levels near the threshold   
of the test (25 mIU/mL) may give a  
negative or indeterminate result. In such cases, another test   
should be  
performed with a new specimen in 48-72 hours. If early   
pregnancy is suspected  
clinically in this setting, correlation with quantitative   
serum b-hCG level is  
suggested.  
[employer] has confirmed the use of plasma for this   
test. This has not  
been cleared or approved by the U.S. Food and Drug   
Administration. The FDA has  
determined that such clearance is not necessary.   
   
                                                            Performed By: #### D  
AU ####  
[facility]  
[address] [place] [address] [phone]  
: Haresh Zimmer MD   
   
                                                    Laboratory - Chemistry and C  
hemistry - challengeOrdered By: Seth Rahman on   
2021   
   
                                                    GFR/1.73 sq M.predicted   
MDRD (S/P/Bld) [Vol   
rate/Area]                                                      [facility]  
[employer]   
Phone:   
[phone]  
   
                                        Comment on above:   Average GFR for 20-2  
9 years old:  
116 mL/min/1.73sq m  
Chronic Kidney Disease:  
<60 mL/min/1.73sq m  
Kidney failure:  
<15 mL/min/1.73sq m  
  
  
eGFR calculated using average adult body mass. Additional eGFR   
calculator available at:  
  
http://www.3LM/multiple_crcl_2012.htm  
  
  
   
   
                                                            Stage 1: Some kidney  
 damage normal GFR  
Stage 2: Mild kidney damage GFR 60-89  
Stage 3: Moderate kidney damage GFR 30-59  
Stage 4: Severe kidney damage GFR 15-29  
Stage 5: Severe kidney damage GFR <15  
ESRD - chronic treatment by dialysis or transplant  
  
  
   
   
                                                    No Panel InformationOrdered   
By: Seth Rahman on 2021   
   
                                                    Interpretation and   
review of laboratory   
results         Abnormal                                        [facility]   
Phone:   
[phone]  
   
                                                                  [facility]   
Phone:   
[phone]  
   
                                                    SARS-CoV-2on 2021   
   
                                                    SARS-CoV-2 (COVID-19)   
RNA PAMELA+probe Ql (Unsp   
spec)           Not detected    Normal          [facility]  
   
                                        Comment on above:   Result Comment:  
Rapid NAAT: The specimen is NEGATIVE for SARS-CoV-2, the novel   
coronavirus  
associated with COVID-19.  
The ID NOW COVID-19 assay is designed to detect the virus that   
causes COVID-19  
in patients with signs and symptoms of infection who are   
suspected of COVID-19.  
An individual without symptoms of COVID-19 and who is not   
shedding SARS-CoV-2  
virus would expect to have a negative (not detected) result in   
this assay.  
Negative results should be treated as presumptive and, if   
inconsistent with  
clinical signs and symptoms or necessary for patient   
management,  
should be tested with an alternative molecular assay. Negative   
results do not  
preclude SARS-CoV-2 infection and  
should not be used as the sole basis for patient management   
decisions.  
Fact sheet for Healthcare Providers:   
https://www.fda.gov/media/078160/download  
Fact sheet for Patients:   
https://www.fda.gov/media/096890/download  
Methodology: Isothermal Nucleic Acid Amplification   
   
                                                            Performed By: #### C  
OVRB ####  
[facility]  
[address] [place], [place] [address] [phone]  
: Haresh Zimmer MD   
   
                                                    Salicylateon 2021   
   
                      Salicylate <1         Low        3-10       [facility]  
   
                                        Comment on above:   Performed By: #### D  
AU ####  
[facility]  
[address] [place], [place] [address] [phone]  
: Haresh Zimmer MD   
   
                                                    SalicylateOrdered By: Meghna Rahman on 2021   
   
                          Salicylate Lvl <1           Low          3 - 10   
mg/dL                                   [facility]  
Work   
Phone:   
[phone]  
   
                                                    Urinalysis w/ Microon 2021   
   
                      -----                 Normal                [facility]  
   
                                        Comment on above:   Performed By: #### C  
OVRB ####  
[facility]  
[address] [place], [place] [address] [phone]  
: Haresh Zimmer MD   
   
                      Bacteria   1+         Abnormal   NONE       [facility]  
   
                                        Comment on above:   Performed By: #### C  
OVRB ####  
[facility]  
[address] [place] 1618883 [phone]  
: Haresh Zimmer MD   
   
                      Bilirubin, SemiQt,Ur Negative   Normal     NEG        [facility]  
   
                                        Comment on above:   Performed By: #### C  
OVRB ####  
[facility]  
[address] [place], [place] [address] [phone]  
: Haresh Zimmer MD   
   
                      Blood, Urine Negative   Normal     NEG        [facility]  
   
                                        Comment on above:   Performed By: #### C  
OVRB ####  
[facility]  
[address] [place], [place] [address] [phone]  
: Haresh Zimmer MD   
   
                      Clarity (U) CLEAR      Normal     CLEAR      [facility]  
   
                                        Comment on above:   Performed By: #### C  
OVRB ####  
[facility]  
[address] [place], [place] [address] [phone]  
: Haresh Zimmer MD   
   
                      Color (U)  YELLOW     Normal     YEL        [facility]  
   
                                        Comment on above:   Performed By: #### C  
OVRB ####  
[facility]  
[address] [place], [place] [address] [phone]  
: Haresh Zimmer MD   
   
                                                    Epithelial cells LM Ql   
(Urine sed)     5 TO 10         Normal          0-25            [facility]  
   
                                        Comment on above:   Performed By: #### C  
OVRB ####  
[facility]  
[address] [place], [place] [address] [phone]  
: Haresh Zimmer MD   
   
                      Glucose Ql (U) Negative   Normal     NEG        [facility]  
   
                                        Comment on above:   Performed By: #### C  
OVRB ####  
[facility]  
[address] [place] [address] [place] [place], [place] [address]83 [phone]  
: Haresh Zimmer MD   
   
                      Ketones Ql (U) Negative   Normal     NEG        [facility]  
   
                                        Comment on above:   Performed By: #### C  
OVRB ####  
[facility]  
[address] [place] [address] [place] [place], [place] [address] [phone]  
: Haresh Zimmer MD   
   
                                                    Leukocyte esterase Test   
strip Ql (U)    Negative        Normal          NEG             [facility]  
   
                                        Comment on above:   Performed By: #### C  
OVRB ####  
[facility]  
[address] [place] [address] [place] [address]  
[phone]  
: Haresh Zimmer MD   
   
                      Mucus Strands 1+         Abnormal   NONE       [facility]  
   
                                        Comment on above:   Performed By: #### C  
OVRB ####  
[facility]  
[address] [place], [place] [address] [phone]  
: Haresh Zimmer MD   
   
                      Nitrite,Ur Negative   Normal     NEG        [facility]  
   
                                        Comment on above:   Performed By: #### C  
OVRB ####  
[facility]  
[address] [place], [place] [address] [phone]  
: Haresh Zimmer MD   
   
                      PH,Ur      6.5        Normal     5.0-9.0    [facility]  
   
                                        Comment on above:   Performed By: #### C  
OVRB ####  
[facility]  
[address] [place], [place] [address] [phone]  
: Haresh Zimmer MD   
   
                      Protein Ql (U) Negative   Normal     NEG        [facility]  
   
                                        Comment on above:   Performed By: #### C  
OVRB ####  
[facility]  
[address] [place], [place] [address]83 [phone]  
: Haresh Zimmer MD   
   
                      Spec. Gravity,Ur 1.020      Normal     1.010-1.020 [facility]  
   
                                        Comment on above:   Performed By: #### C  
OVRB ####  
[facility]  
[address] [place] [address] [phone]  
: Haresh Zimmer MD   
   
                      Urine RBC's 0 TO 2     Normal     0-2        [facility]  
   
                                        Comment on above:   Performed By: #### C  
OVRB ####  
[facility]  
[address] [place], [facility] [phone]  
: Haresh Zimmer MD   
   
                      Urine WBC's 0 TO 2     Normal     0-5        [facility]  
   
                                        Comment on above:   Performed By: #### C  
OVRB ####  
[address] [place] [address]  
[phone]  
: Haresh Zimmer MD   
   
                      Urobilinogen,Ur Normal     Normal     NORM       [facility]  
   
                                        Comment on above:   Performed By: #### C  
OVRB ####  
[facility]  
[address] [place], [facility]  
[phone]  
: Haresh Zimmer MD   
   
                                                    Amorphous sediment LM   
Ql (Urine sed)  NOT REPORTED    Normal          NONE            [facility]  
   
                                        Comment on above:   Performed By: #### C  
OVRB ####  
[facility]  
[address] [place], [place] [address] [phone]  
: Haresh Zimmer MD   
   
                      Casts      NOT REPORTED Normal                [facility]  
   
                                        Comment on above:   Performed By: #### C  
OVRB ####  
[facility]  
[address] [place], [place] [address] [phone]  
: Haresh Zimmer MD   
   
                      Comment    NOT REPORTED Normal                [facility]  
   
                                        Comment on above:   Performed By: #### C  
OVRB ####  
[facility]  
[address] [place], [place] [address] [phone]  
: Haresh Zimmer MD   
   
                                                    Crystals LM Nom (Urine   
sed)            NOT REPORTED    Normal          [facility]  
   
                                        Comment on above:   Performed By: #### C  
OVRB ####  
[facility]  
[address] [place], [place] [address] [phone]  
: Haresh Zimmer MD   
   
                      Epithelial, Renal NOT REPORTED Normal     [address] [place] [address]  
   
                                        Comment on above:   Performed By: #### C  
OVRB ####  
[facility]  
[address] [place], [place] [address] [phone]  
: Haresh Zimmer MD   
   
                      Other Observations NOT REPORTED Normal     NREQ       [facility]  
   
                                        Comment on above:   Performed By: #### C  
OVRB ####  
[facility]  
[address] [place] [address]  
[phone]  
: Haresh Zimmer MD   
   
                      Trichomonas NOT REPORTED Normal     [facility]  
   
                                        Comment on above:   Performed By: #### C  
OVRB ####  
[facility]  
[address] [place] [address] [address] [place], [place] [address] [phone]  
: Haresh Zimmer MD   
   
                      Yeast      NOT REPORTED Normal     NONE       [facility]  
   
                                        Comment on above:   Performed By: #### C  
OVRB ####  
[facility]  
[address] [place], [place] [address] [phone]  
: Haresh Zimmer MD   
   
                                                    Urinalysis with microscopicO  
rdered By: Seth Rahman on 2021   
   
                      -                                           [facility]  
[employer]   
Phone:   
[phone]  
   
                      Amorphous, UA NOT REPORTED            None       [facility]   
Phone:   
[phone]  
   
                      Bacteria, UA 1+         Abnormal   None       [facility]   
Phone:   
[phone]  
   
                      Bilirubin Urine Negative              NEGATIVE   [facility]  
Work   
Phone:   
[phone]  
   
                      Casts UA   NOT REPORTED            /LPF       [facility]   
[employer]  
Work   
Phone:   
[phone]  
   
                      Color, UA  YELLOW                YELLOW     [facility]  
Work   
Phone:   
[phone]  
   
                      Crystals, UA NOT REPORTED            None /HPF  [facility]   
[employer]  
Work   
Phone:   
[phone]  
   
                      Epithelial Cells UA 5 TO 10                          [facility]  
   
[employer]  
Work   
Phone:   
[phone]  
   
                      Glucose, Ur Negative              NEGATIVE   [facility]  
Work   
Phone:   
[phone]  
   
                                                    Interpretation and   
review of laboratory   
results         Abnormal                                        [facility]  
Work   
Phone:   
[phone]  
   
                      Ketones Ql (U) Negative              NEGATIVE   [facility]   
[employer]  
Work   
Phone:   
[phone]  
   
                                                    Leukocyte esterase Test   
strip Ql (U)    Negative                        NEGATIVE        [facility]  
Work   
Phone:   
[phone]  
   
                      Mucus, UA  1+         Abnormal   None       [facility]  
Work   
Phone:   
[phone]  
   
                      Nitrite, Urine Negative              NEGATIVE   [facility]  
Work   
Phone:   
[phone]  
   
                      Other Observations UA NOT REPORTED            NOT REQ.   [facility]  
[facility]  
Work   
Phone:   
[phone]  
   
                      pH, UA     6.5                              [facility]   
[employer]  
Work   
Phone:   
[phone]  
   
                      Protein, UA Negative              NEGATIVE   [facility]  
Work   
Phone:   
[phone]  
   
                      RBC, UA    0 TO 2                           [facility]   
[employer]  
Work   
Phone:   
[phone]  
   
                      Renal Epithelial, UA NOT REPORTED            0 /HPF     [facility]  
y   
Health  
Work   
Phone:   
[phone]  
   
                      Specific Gravity, UA 1.020                            [facility]  
[employer]  
Work   
Phone:   
[phone]  
   
                      Trichomonas, UA NOT REPORTED            None       [facility]   
[employer]  
Work   
Phone:   
[phone]  
   
                      Turbidity UA CLEAR                 CLEAR      [facility]   
[employer]  
Work   
Phone:   
[phone]  
   
                      Urinalysis Comments NOT REPORTED                       [facility]  
   
Health  
Work   
Phone:   
[phone]  
   
                      Urine Hgb  Negative              NEGATIVE   [facility]  
Work   
Phone:   
[phone]  
   
                      Urobilinogen, Urine Normal                Normal     [facility]  
   
[employer]  
Work   
Phone:   
[phone]  
   
                      WBC, UA    0 TO 2                           [employer]  
Work   
Phone:   
[phone]  
   
                      Yeast, UA  NOT REPORTED            None       [employer]  
Work   
Phone:   
[phone]  
   
                                                                  [facility]  
Work   
Phone:   
[phone]  
   
                                                    Urine Drug ScreenOrdered By:  
 Seth Rahman on 2021   
   
                      Amphetamine Screen, Ur Negative              NEGATIVE   [facility]   
[employer]  
Work   
Phone:   
[phone]  
   
                      Barbiturate Screen, Ur Negative              NEGATIVE   [facility]   
[employer]  
Work   
Phone:   
[phone]  
   
                                                    Benzodiazepine Screen,   
Urine           Negative                        NEGATIVE        [facility]   
[employer]  
Work   
Phone:   
[phone]  
   
                      Buprenorphine Urine Negative              NEGATIVE   [facility]  
Work   
Phone:   
[phone]  
   
                      Cannabinoid Scrn, Ur Positive   Abnormal   NEGATIVE   [facility]  
y   
Health  
Work   
Phone:   
[phone]  
   
                                                    Cocaine Metabolite,   
Urine           Negative                        NEGATIVE        [facility]  
Work   
Phone:   
[phone]  
   
                                                    Interpretation and   
review of laboratory   
results         Abnormal                                        [facility]  
Work   
Phone:   
[phone]  
   
                      MDMA, Urine NOT REPORTED            NEGATIVE   [facility]  
Work   
Phone:   
[phone]  
   
                      Methadone Screen, Urine Negative              NEGATIVE   [facility]   
[employer]  
Work   
Phone:   
[phone]  
   
                      Methamphetamine, Urine Negative              NEGATIVE   [facility]   
[employer]  
Work   
Phone:   
[phone]  
   
                      Opiates, Urine Negative              NEGATIVE   [facility]   
Health  
Work   
Phone:   
[phone]  
   
                      Oxycodone Screen, Ur Negative              NEGATIVE   [facility]  
y   
Health  
Work   
Phone:   
[phone]  
   
                      Phencyclidine, Urine Negative              NEGATIVE   [facility]  
y   
Health  
Work   
Phone:   
[phone]  
   
                      Propoxyphene, Urine Negative              NEGATIVE   [facility]  
   
Health  
Work   
Phone:   
[phone]  
   
                      Test Information NOT REPORTED                       [employer]  
Work   
Phone:   
[phone]  
   
                                                    Tricyclic   
Antidepressants, Urine Negative                        NEGATIVE        [facility]   
[employer]  
Work   
Phone:   
[phone]  
   
                                        Comment on above:   Drug screen results   
are to be used for medical purposes only.   
All positive results are  
unconfirmed. Testing for employment or legal uses should be   
sent to a reference laboratory  
for confirmation.  
  
   
   
                                                                  [employer]  
Work   
Phone:   
[phone]  
   
                                                    Acetaminophenon 2021   
   
                                                    Acetaminophen   
[Mass/Vol]      ug/mL           Low             10-30           [facility]  
   
                                        Comment on above:   Performed By: #### D  
AU ####  
[facility]  
[address] [place] [address] [phone]  
: Haresh Zimmer MD   
   
                                                    Acetaminophen levelOrdered B  
y: Malika Shepherd on 2021   
   
                          Acetaminophen Level <5           Low          10 - 30   
ug/mL                                   [employer]   
Phone:   
[phone]  
   
                                                    Interpretation and   
review of laboratory   
results         Abnormal                                        [employer]   
Phone:   
[phone]  
   
                                                                  [employer]   
Phone:   
[phone]  
   
                                                    CBC auto differentialOrdered  
 By: Malika Chengsain on 2021   
   
                      Absolute Eos # 0.18                             [employer]   
Phone:   
[phone]  
   
                                                    Absolute Immature   
Granulocyte     0.06                                            [employer]   
Phone:   
[phone]  
   
                      Absolute Lymph # 2.41                             [employer]   
Phone:   
[phone]  
   
                      Absolute Mono # 0.83                             [employer]   
Phone:   
[phone]  
   
                      Basophils (Bld) [#/Vol] 0.07 10*3/uL                        
 [employer]   
Phone:   
[phone]  
   
                      Basophils/100 WBC (Bld) 1 %                   0 - 2 %    [facility]  
[facility]   
Phone:   
[phone]  
   
                      Differential Type NOT REPORTED                       [employer]   
Phone:   
[phone]  
   
                                                    Eosinophils/100 WBC   
(Bld)           1 %                             1 - 4 %         [employer]   
Phone:   
[phone]  
   
                                                    Hematocrit (Bld)   
[Volume fraction]   45.1 %                                  36.3 - 47.1   
%                                       [employer]   
Phone:   
[phone]  
   
                                                    Hemoglobin.gastrointest  
inal spec 1 Ql (Stl) 15.0 g/dL                               11.9 - 15.1   
g/dL                                    [employer]   
Phone:   
[phone]  
   
                                                    Immature   
granulocytes/100 WBC   
(Bld)           1 %             High            0               [employer]   
Phone:   
[phone]  
   
                                                    Interpretation and   
review of laboratory   
results         Abnormal                                        [employer]   
Phone:   
[phone]  
   
                                                    Lymphocytes/100 WBC   
(Bld)           18 %            Low             24 - 43 %       [employer]   
Phone:   
[phone]  
   
                                                    MCH (RBC) [Entitic   
mass]               28.4 pg                                 25.2 - 33.5   
pg                                      [employer]   
Phone:   
[phone]  
   
                          MCHC (RBC) [Mass/Vol] 33.3 g/dL                 28.4 -  
 34.8   
g/dL                                    [employer]   
Phone:   
[phone]  
   
                          MCV (RBC) [Entitic vol] 85.4 fL                   82.6  
 -   
102.9 fL                                [employer]   
Phone:   
[phone]  
   
                      Monocytes/100 WBC (Bld) 6 %                   3 - 12 %   M  
[facility]   
Phone:   
[phone]  
   
                          NRBC Automated 0.0                       0.0 per 100   
WBC                                     [employer]   
Phone:   
[phone]  
   
                                                    Platelet distribution   
width (Bld) [Ratio] 13.0 %                                  11.8 - 14.4   
%                                       [employer]   
Phone:   
[phone]  
   
                      Platelet Estimate NOT REPORTED                       [employer]   
Phone:   
[phone]  
   
                                                    Platelet mean volume   
(Bld) [Entitic vol] 8.3 fL                                  8.1 - 13.5   
fL                                      [employer]   
Phone:   
[phone]  
   
                      Platelets (Bld) [#/Vol] 301 10*3/uL                         
[employer]   
Phone:   
[phone]  
   
                          RBC (Bld) [#/Vol] 5.28 10*6/uL High         3.95 - 5.1  
1   
m/uL                                    [employer]   
Phone:   
[phone]  
   
                      RBC (Bld) [#/Vol] NOT REPORTED                       [employer]   
Phone:   
[phone]  
   
                                                    Segmented   
neutrophils/100 WBC   
(Bld)           73 %            High            36 - 65 %       [employer]   
Phone:   
[phone]  
   
                      Segs Absolute 9.71       High                  [employer]   
Phone:   
[phone]  
   
                      WBC (Bld) [#/Vol] 13.3 10*3/uL High                  [employer]   
Phone:   
[phone]  
   
                      WBC (Bld) [#/Vol] NOT REPORTED                       [facility]  
Work   
Phone:   
[phone]  
   
                                                                  [facility]  
Work   
Phone:   
[phone]  
   
                                                    CBC with Diffon 2021   
   
                      Abs. Basophil 0.07 k/uL  Normal     0.00-0.20  [facility]  
   
                                        Comment on above:   Performed By: #### C  
DP, WEN, CP, HCG ####  
[address] [place] [address]  
[phone]  
: Haresh Zimmer MD   
   
                      Abs.Imm.Granulocyte 0.06 k/uL  Normal     0.00-0.30  [facility]  
   
                                        Comment on above:   Performed By: #### C  
DPWEN, CP, HCG ####  
[address] [place] [address] [phone]  
: Haresh Zimmer MD   
   
                      Abs.Neutrophil (Seg) 9.71 k/uL  High       1.50-8.10  [facility]  
   
                                        Comment on above:   Performed By: #### C  
WEN MARTIN, CP, HCG ####  
[address] [place] [address]  
[phone]  
: Haresh Zimmer MD   
   
                      Basophils/100 WBC (Bld) 1 %        Normal     0-2        [facility]  
   
                                        Comment on above:   Performed By: #### C  
WEN MARTIN, CP, HCG ####  
[address] [place] [address]  
[phone]  
: Haresh Zimmer MD   
   
                                                    Eosinophils (Bld)   
[#/Vol]         0.18 10*3/uL    Normal          0.00-0.44       [facility]  
   
                                        Comment on above:   Performed By: #### C  
DP, SALI, CP, HCG ####  
[address] [place], [place] [address] [phone]  
: Haresh Zimmer MD   
   
                                                    Eosinophils/100 WBC   
(Bld)           1 %             Normal          1-4             [facility]  
   
                                        Comment on above:   Performed By: #### C  
DP, SALI, CP, HCG ####  
[facility]  
[address] [place] [address] [place] [address]  
[phone]  
: Haresh Zimmer MD   
   
                                                    Erythrocyte   
distribution width   
(RBC) [Ratio]   13.0 %          Normal          11.8-14.4       [facility]  
   
                                        Comment on above:   Performed By: #### C  
DP, SALI, CP, HCG ####  
[address] [place] [address]  
[phone]  
: Haresh Zimmer MD   
   
                                                    Hematocrit (Bld)   
[Volume fraction] 45.1 %          Normal          36.3-47.1       [facility]  
   
                                        Comment on above:   Performed By: #### C  
DP, SALI, CP, HCG ####  
[address] [place] [address]  
[phone]  
: Haresh Zimmer MD   
   
                                                    Hemoglobin (Bld)   
[Mass/Vol]      15.0 g/dL       Normal          11.9-15.1       [facility]  
   
                                        Comment on above:   Performed By: #### C  
DP, SALI, CP, HCG ####  
[address] [place] [address]  
[phone]  
: Haresh Zimmer MD   
   
                                                    Immature   
granulocytes/100 WBC   
(Bld)           1 %             High            0               [facility]  
   
                                        Comment on above:   Performed By: #### C  
DP, SALI, CP, HCG ####  
[address] [place] [address]  
[phone]  
: Haresh Zimmer MD   
   
                                                    Lymphocytes (Bld)   
[#/Vol]         2.41 10*3/uL    Normal          1.10-3.70       [facility]  
   
                                        Comment on above:   Performed By: #### C  
DP, SALI, CP, HCG ####  
[address] [place], [place] [address] [phone]  
: Haresh Zimmer MD   
   
                                                    Lymphocytes/100 WBC   
(Bld)           18 %            Low             24-43           [facility]  
   
                                        Comment on above:   Performed By: #### C  
DP, SALI, CP, HCG ####  
[facility]  
[address] [place] [address]  
[phone]  
: Haresh Zimmer MD   
   
                                                    MCH (RBC) [Entitic   
mass]           28.4 pg         Normal          25.2-33.5       [facility]  
   
                                        Comment on above:   Performed By: #### C  
DP, SALI, CP, HCG ####  
[facility]  
[address] [place] [address]  
[phone]  
: Haresh Zimmer MD   
   
                      MCHC (RBC) [Mass/Vol] 33.3 g/dL  Normal     28.4-34.8  [facility]  
   
                                        Comment on above:   Performed By: #### C  
DP, SALI, CP, HCG ####  
[address] [place] [address]  
[phone]  
: Haresh Zimmer MD   
   
                      MCV (RBC) [Entitic vol] 85.4 fL    Normal     82.6-102.9 [facility]  
   
                                        Comment on above:   Performed By: #### C  
DP, WEN, CP, HCG ####  
[address] [place] [address]  
[phone]  
: Haresh Zimmer MD   
   
                      Monocytes (Bld) [#/Vol] 0.83 10*3/uL Normal     0.10-1.20   
 [facility]  
   
                                        Comment on above:   Performed By: #### C  
DP, SALI, CP, HCG ####  
[address] [place] [address]  
[phone]  
: Haresh Zimmer MD   
   
                      Monocytes/100 WBC (Bld) 6 %        Normal     3-12       M  
[facility]  
   
                                        Comment on above:   Performed By: #### C  
DP, SALI, CP, HCG ####  
[address] [place] [address]  
[phone]  
: Haresh Zimmer MD   
   
                      Neutrophil (Seg) 73 %       High       36-65      [facility]  
   
                                        Comment on above:   Performed By: #### C  
DP SALI, CP, HCG ####  
[address] [place] [address]  
[phone]  
: Haresh iZmmer MD   
   
                      NRBC Automated 0.0 per 100 WBC Normal     0.0        [facility]  
   
                                        Comment on above:   Performed By: #### C  
DP, SALI, CP, HCG ####  
[address] [place], [place] [address]  
[phone]  
: Haresh Zimmer MD   
   
                                                    Platelet mean volume   
(Bld) [Entitic vol] 8.3 fL          Normal          8.1-13.5        [facility]  
   
                                        Comment on above:   Performed By: #### C  
WEN MARTIN, CP, HCG ####  
[address] [place] [address]  
[phone]  
: Haresh Zimmer MD   
   
                      Platelets (Bld) [#/Vol] 301 10*3/uL Normal     138-453      
[facility]  
   
                                        Comment on above:   Performed By: #### C  
DPWEN, CP, HCG ####  
[address] [place] [address]  
[phone]  
: Haresh Zimmer MD   
   
                      RBC (Bld) [#/Vol] 5.28 10*6/uL High       3.95-5.11  [facility]  
   
                                        Comment on above:   Performed By: #### C  
WEN MARTIN, CP, HCG ####  
[address] [place], [facility]  
[phone]  
: Haresh Zimmer MD   
   
                      WBC (Bld) [#/Vol] 13.3 10*3/uL High       3.5-11.3   [facility]  
   
                                        Comment on above:   Performed By: #### C  
VERONICA SALI, CP, HCG ####  
[address] [place] [address]  
[phone]  
: Haresh Zimmer MD   
   
                      Auto Diff Performed NOT REPORTED Normal                [facility]  
   
                                        Comment on above:   Performed By: #### C  
DP, SALI, CP, HCG ####  
[facility]  
[address] [place], [place] [address] [phone]  
: Haresh Zimmer MD   
   
                      Platelet Estimate NOT REPORTED Normal                [facility]  
   
                                        Comment on above:   Performed By: #### C  
DP, SALI, CP, HCG ####  
[facility]  
[address] [place] [address]  
[phone]  
: Haresh Zimmer MD   
   
                                                    RBC morphology finding   
Nom (Bld)       NOT REPORTED    Normal                          [facility]  
   
                                        Comment on above:   Performed By: #### C  
DP, SALI, CP, HCG ####  
[facility]  
[address] [place] [address] [place] [address]  
[phone]  
: Haresh Zimmer MD   
   
                      WBC Morphology NOT REPORTED Normal                [facility]  
   
                                        Comment on above:   Performed By: #### C  
DP, SALI, CP, HCG ####  
[address] [place], [place] [address] [phone]  
: Haresh Zimmer MD   
   
                                                    COVID-19, RapidOrdered By: SIMONE Shepherd on 2021   
   
                                                    SARS-CoV-2 (COVID-19)   
RNA PAMELA+probe Ql (Unsp   
spec)               Not detected                            Not   
Detected                                [facility]  
Work   
Phone:   
[phone]  
   
                                        Comment on above:     
Rapid NAAT: The specimen is NEGATIVE for SARS-CoV-2, the novel   
coronavirus associated with  
COVID-19.  
  
The ID NOW COVID-19 assay is designed to detect the virus that   
causes COVID-19 in patients  
with signs and symptoms of infection who are suspected of   
COVID-19.  
An individual without symptoms of COVID-19 and who is not   
shedding SARS-CoV-2 virus would  
expect to have a negative (not detected) result in this assay.  
Negative results should be treated as presumptive and, if   
inconsistent with clinical signs  
and symptoms or necessary for patient management,  
should be tested with an alternative molecular assay. Negative   
results do not preclude  
SARS-CoV-2 infection and  
should not be used as the sole basis for patient management   
decisions.  
  
Fact sheet for Healthcare Providers:   
https://www.fda.gov/media/441704/download  
Fact sheet for Patients:   
https://www.fda.gov/media/726810/download  
  
Methodology: Isothermal Nucleic Acid Amplification  
  
   
   
                      Specimen Description .NASOPHARYNGEAL SWAB                   
      [facility]  
Work   
Phone:   
[phone]  
   
                                                                  [facility]  
Work   
Phone:   
[phone]  
   
                                                    Comp Metabolic Profon 2021   
   
                      (cont.)               Normal                [facility]  
   
                                        Comment on above:   Result Comment: Aver  
age GFR for 20-29 years old:  
116 mL/min/1.73sq m  
Chronic Kidney Disease:  
<60 mL/min/1.73sq m  
Kidney failure:  
<15 mL/min/1.73sq m  
eGFR calculated using average adult body mass. Additional eGFR   
calculator  
available at:  
http://www.3LM/multiple_crcl_.htm   
   
                                                            Performed By: #### C  
WEN MARTIN CP, HCG ####  
[facility]  
[address] [place] [address] [place] [address] [phone]  
: Haresh Zimmer MD   
   
                      Albumin [Mass/Vol] 4.4 g/dL   Normal     3.5-5.2    [facility]  
   
                                        Comment on above:   Performed By: #### C  
WEN MARTIN CP, HCG ####  
[address] [place] [address] [phone]  
: Haresh Zimmer MD   
   
                      Albumin/Glob Ratio 1.3        Normal     1.0-2.5    [facility]  
   
                                        Comment on above:   Performed By: #### C  
WEN MARTIN CP, HCG ####  
[facility]  
[address] [place] [address] [phone]  
: Haresh Zimmer MD   
   
                      Alkaline Phos 82 U/L     Normal          [facility]  
   
                                        Comment on above:   Performed By: #### C  
WEN MARTIN CP, HCG ####  
[facility]  
[address] [place] [address] [phone]  
: Haresh Zimmer MD   
   
                                                    ALT [Catalytic   
activity/Vol]   40 U/L          High            5-33            [facility]  
   
                                        Comment on above:   Performed By: #### C  
WEN MARTIN CP, HCG ####  
[facility]  
[address] [place], [place] [address] [phone]  
: Haresh Zimmer MD   
   
                      Anion gap [Moles/Vol] 13 mmol/L  Normal     9-17       [facility]  
   
                                        Comment on above:   Performed By: #### C  
DP, SALI, CP, HCG ####  
[facility]  
[address] [place] [address]  
[phone]  
: Haresh Zimmer MD   
   
                                                    AST [Catalytic   
activity/Vol]   26 U/L          Normal          <32             [facility]  
   
                                        Comment on above:   Performed By: #### C  
DP, SALI, CP, HCG ####  
[address] [place] [address] [phone]  
: Haresh Zimmer MD   
   
                      Bilirubin [Mass/Vol] 0.39 mg/dL Normal     0.3-1.2    [facility]  
   
                                        Comment on above:   Performed By: #### C  
DP, SALI, CP, HCG ####  
[address] [place], [place] [address] [phone]  
: Haresh Zimmer MD   
   
                      BUN/CRE Ratio 12         Normal     9-20       [facility]  
   
                                        Comment on above:   Performed By: #### C  
DP, SALI, CP, HCG ####  
[address] [place], [place] [address] [phone]  
: Haresh Zimmer MD   
   
                      Calcium [Mass/Vol] 9.9 mg/dL  Normal     8.6-10.4   [facility]  
   
                                        Comment on above:   Performed By: #### C  
DP, SALI, CP, HCG ####  
[address] [place], [place] [address] [phone]  
: Haresh Zimmer MD   
   
                      Chloride [Moles/Vol] 99 mmol/L  Normal          [facility]  
   
                                        Comment on above:   Performed By: #### C  
DP, SALI, CP, HCG ####  
[address] [place], [place] [address] [phone]  
: Haresh Zimmer MD   
   
                      CO2 [Moles/Vol] 25 mmol/L  Normal     20-31      [facility]  
   
                                        Comment on above:   Performed By: #### C  
DP, SALI, CP, HCG ####  
[facility]  
[address] [place], [place] [address] [phone]  
: Haresh Zimmer MD   
   
                      Creatinine [Mass/Vol] 0.81 mg/dL Normal     0.50-0.90  [facility]  
   
                                        Comment on above:   Performed By: #### C  
DP, SALI, CP, HCG ####  
[facility]  
[address] [place], [place] [address] [phone]  
: Haresh Zimmer MD   
   
                      GFR, Amer >60        Normal     >60        [facility]  
   
                                        Comment on above:   Performed By: #### C  
DP, SALI, CP, HCG ####  
[facility]  
[address] [place] [address] [place] [place], [place] [address] [phone]  
: Haresh Zimmer MD   
   
                      GFR,non  Amer >60        Normal     >60        [facility]  
   
                                        Comment on above:   Performed By: #### C  
DP, SALI, CP, HCG ####  
[facility]  
[address] [place] [address] [place] [place], [place] [address] [phone]  
: Haresh Zimmer MD   
   
                      Glucose [Mass/Vol] 86 mg/dL   Normal     70-99      [facility]  
   
                                        Comment on above:   Performed By: #### C  
DP, SALI, CP, HCG ####  
[facility]  
[address] [place], [place] [address] [phone]  
: Haresh Zimmer MD   
   
                      Potassium [Moles/Vol] 3.7 mmol/L Normal     3.7-5.3    [facility]  
   
                                        Comment on above:   Performed By: #### C  
DP, SALI, CP, HCG ####  
[facility]  
[address] [place], [place] [address] [phone]  
: Haresh Zimmer MD   
   
                      Protein [Mass/Vol] 7.8 g/dL   Normal     6.4-8.3    [facility]  
   
                                        Comment on above:   Performed By: #### C  
DP, WEN, CP, HCG ####  
[facility]  
[address] [place] [address] [phone]  
: Haresh Zimmer MD   
   
                      Sodium [Moles/Vol] 137 mmol/L Normal     135-144    [facility]  
   
                                        Comment on above:   Performed By: #### C  
DP, SALI, CP, HCG ####  
[facility]  
[address] [place] [address] [phone]  
: Haresh Zimmer MD   
   
                      Staging:              Normal                [facility]  
   
                                        Comment on above:   Result Comment: Stag  
e 1: Some kidney damage normal GFR  
Stage 2: Mild kidney damage GFR 60-89  
Stage 3: Moderate kidney damage GFR 30-59  
Stage 4: Severe kidney damage GFR 15-29  
Stage 5: Severe kidney damage GFR <15  
ESRD - chronic treatment by dialysis or transplant   
   
                                                            Performed By: #### C  
DP, WEN, CP, HCG ####  
[facility]  
[address] [place] [address] [phone]  
: Haresh Zimmer MD   
   
                                                    Urea nitrogen   
[Mass/Vol]      10 mg/dL        Normal          6-20            [facility]  
   
                                        Comment on above:   Performed By: #### C  
DP, SALI, CP, HCG ####  
[facility]  
[address] [place] [address] [phone]  
: Haresh Zimmer MD   
   
                                                    Comprehensive Metabolic Pane  
lOrdered By: Malika Shepherd on 2021   
   
                          Albumin [Mass/Vol] 4.4 g/dL                  3.5 - 5.2  
   
g/dL                                    [facility]  
Work   
Phone:   
[phone]  
   
                                                    Albumin/Globulin [Mass   
ratio]          1.3 {ratio}                                     [facility]   
Phone:   
[phone]  
   
                                                    ALP (Bld) [Catalytic   
activity/Vol]       82 U/L                                  35 - 104   
U/L                                     [facility]   
Phone:   
[phone]  
   
                                                    ALT [Catalytic   
activity/Vol]   40 U/L          High            5 - 33 U/L      [facility]   
Phone:   
[phone]  
   
                          Anion gap [Moles/Vol] 13 mmol/L                 9 - 17  
   
mmol/L                                  [facility]   
Phone:   
[phone]  
   
                                                    AST [Catalytic   
activity/Vol]   26 U/L                          <32             [facility]   
Phone:   
[phone]  
   
                          Bilirubin [Mass/Vol] 0.39 mg/dL                0.3 - 1  
.2   
mg/dL                                   [facility]   
Phone:   
[phone]  
   
                          Calcium [Mass/Vol] 9.9 mg/dL                 8.6 - 10.  
4   
mg/dL                                   [facility]   
Phone:   
[phone]  
   
                          Chloride [Moles/Vol] 99 mmol/L                 98 - 10  
7   
mmol/L                                  [facility]   
Phone:   
[phone]  
   
                          CO2 [Moles/Vol] 25 mmol/L                 20 - 31   
mmol/L                                  [facility]   
Phone:   
[phone]  
   
                          Creatinine [Mass/Vol] 0.81 mg/dL                0.50 -  
 0.90   
mg/dL                                   [facility]   
Phone:   
[phone]  
   
                                                    Free PSA/Total PSA   
[Mass fraction]     7.8 g/dL                                6.4 - 8.3   
g/dL                                    [facility]   
Phone:   
[phone]  
   
                      GFR  >60                   >60 mL/min [facility]  
y   
Health  
Work   
Phone:   
[phone]  
   
                                                    GFR Non-   
American        >60                             >60 mL/min      [facility]   
Phone:   
[phone]  
   
                          Glucose [Mass/Vol] 86 mg/dL                  70 - 99   
mg/dL                                   [facility]   
Phone:   
[phone]  
   
                          Potassium [Moles/Vol] 3.7 mmol/L                3.7 -   
5.3   
mmol/L                                  [facility]   
Phone:   
[phone]  
   
                          Sodium [Moles/Vol] 137 mmol/L                135 - 144  
   
mmol/L                                  [facility]   
Phone:   
[phone]  
   
                                                    Urea nitrogen (BldV)   
[Mass/Vol]          10 mg/dL                                6 - 20   
mg/dL                                   [facility]   
Phone:   
[phone]  
   
                                                    Urea   
nitrogen/Creatinine   
(Bld) [Mass ratio] 12                                              [facility]  
Work   
Phone:   
[phone]  
   
                                                    Drug Scr, Abuse, Uron 2021   
   
                      Amphetamine(s),Ur Negative   Normal     [facility]  
   
                                        Comment on above:   Performed By: #### C  
OVRB ####  
[facility]  
[address] [place], [place] [address] [phone]  
: Haresh Zimmer MD   
   
                      Barbiturate(s),Ur Negative   Normal     NEG        [facility]  
   
                                        Comment on above:   Performed By: #### C  
OVRB ####  
[facility]  
[address] [place] [address]  
[phone]  
: Haresh Zimmer MD   
   
                      Benzodiazepine(s) Negative   Normal     NEG        [facility]  
   
                                        Comment on above:   Performed By: #### C  
OVRB ####  
[facility]  
[address] [place], [place] [address] [phone]  
: Haresh Zimmer MD   
   
                      Buprenorphrine, Ur Negative   Normal     [facility]  
   
                                        Comment on above:   Performed By: #### C  
OVRB ####  
[facility]  
[address] [place] [address]  
[phone]  
: Haresh Zimmer MD   
   
                      Cannabinoid(s),Ur Positive   Abnormal   NEG        [facility]  
   
                                        Comment on above:   Performed By: #### C  
OVRB ####  
[facility]  
[address] [place] [address]  
[phone]  
: Haresh Zimmer MD   
   
                      Cocaine Metabolite Negative   Normal     [facility]  
   
                                        Comment on above:   Performed By: #### C  
OVRB ####  
[facility]  
[address] [place], [place] [address] [phone]  
: Haresh Zimmer MD   
   
                      Methadone Ql (U) Negative   Normal     [facility]  
   
                                        Comment on above:   Performed By: #### C  
OVRB ####  
[facility]  
[address] [place], [place] [address] [phone]  
: Haresh Zimmer MD   
   
                      Methamphetamine, Ur Negative   Normal     NEG        [facility]  
   
                                        Comment on above:   Performed By: #### C  
OVRB ####  
[facility]  
[address] [place], [place] [address] [phone]  
: Haresh Zimmer MD   
   
                      Opiate(s), Ur Negative   Normal     NEG        [facility]  
   
                                        Comment on above:   Performed By: #### C  
OVRB ####  
[facility]  
[address] [place], [place] [address] [phone]  
: Haresh Zimmer MD   
   
                      Oxycodone, Urine Negative   Normal     NEG        [facility]  
   
                                        Comment on above:   Performed By: #### C  
OVRB ####  
[facility]  
[address] [place] [address] [place] [place], [place] [address] [phone]  
: Haresh Zimmer MD   
   
                      Phencyclidine, Ur Negative   Normal     [facility]  
   
                                        Comment on above:   Performed By: #### C  
OVRB ####  
[facility]  
[address] [place] [address]  
[phone]  
: Haresh Zimmer MD   
   
                      Propoxyphene,Urine Negative   Normal     NEG        [facility]  
   
                                        Comment on above:   Performed By: #### C  
OVRB ####  
[facility]  
[address] [place] [address] [place] [place], [place] [address] [phone]  
: Haresh Zimmer MD   
   
                                                    Tricyclic   
antidepressants Screen   
Ql (U)          Negative        [facility]  
   
                                        Comment on above:   Result Comment: Drug  
 screen results are to be used for medical  
  
purposes only. All positive  
results are unconfirmed. Testing for employment or legal uses   
should be sent  
to a reference laboratory for confirmation.   
   
                                                            Performed By: #### C  
OVRB ####  
[facility]  
[address] [place], [place] [address] [phone]  
: Haresh Zimmer MD   
   
                      Interpretive Info NOT REPORTED Normal                [facility]  
   
                                        Comment on above:   Performed By: #### C  
OVRB ####  
[facility]  
[address] [place], [place] [address] [phone]  
: Haresh Zimmer MD   
   
                      MDMA, Urine NOT REPORTED Normal     NEG        [facility]  
   
                                        Comment on above:   Performed By: #### C  
OVRB ####  
[facility]  
[address] [place] [address] [phone]  
: Haresh Zimmer MD   
   
                                                    EthanolOrdered By: Malika hernadez on 2021   
   
                      Ethanol [Mass/Vol] mg/dL                 <10 mg/dL  [facility]   
Phone:   
[phone]  
   
                      Ethanol percent <0.010                <0.010 %   [facility]   
Phone:   
[phone]  
   
                                                                  [facility]   
Phone:   
[phone]  
   
                                                    Ethanol Alcoholon 2021  
   
   
                      Ethanol [Mass/Vol] mg/dL      Normal     <10        [facility]  
   
                                        Comment on above:   Performed By: #### D  
AU ####  
[facility]  
[address] [place] [address] [phone]  
: Haresh Zimmer MD   
   
                      Ethanol percent <0.010     Normal     <0.010     [facility]  
   
                                        Comment on above:   Performed By: #### D  
AU ####  
[facility]  
[address] [place] [address] [phone]  
: Haresh Zimmer MD   
   
                                                    HCG Qualitative, SerumOrdere  
d By: Mlaika Shepherd on 2021   
   
                      hCG Qual   Negative              NEGATIVE   [facility]  
[employer]   
Phone:   
[phone]  
   
                                        Comment on above:   Specimens with hCG l  
evels near the threshold of the test (25   
mIU/mL) may give a negative or  
indeterminate result. In such cases, another test should be   
performed with a new specimen  
in 48-72 hours. If early pregnancy is suspected clinically in   
this setting, correlation  
with quantitative serum b-hCG level is suggested.  
  
[employer] has confirmed the use of plasma for this   
test. This has not been cleared  
or approved by the U.S. Food and Drug Administration. The FDA   
has determined that such  
clearance is not necessary.  
  
   
   
                                                                  [employer]   
Phone:   
[phone]  
   
                                                    HCG Screen, Bloodon 20   
   
                      HCG Screen, Blood Negative   Normal     NEG        [facility]  
   
                                        Comment on above:   Result Comment: Spec  
imens with hCG levels near the threshold   
of the test (25 mIU/mL) may give a  
negative or indeterminate result. In such cases, another test   
should be  
performed with a new specimen in 48-72 hours. If early   
pregnancy is suspected  
clinically in this setting, correlation with quantitative   
serum b-hCG level is  
suggested.  
[employer] has confirmed the use of plasma for this   
test. This has not  
been cleared or approved by the U.S. Food and Drug   
Administration. The FDA has  
determined that such clearance is not necessary.   
   
                                                            Performed By: #### C  
DP, SALI, CP, HCG ####  
[facility]  
[address] [place] [address] [phone]  
: Haresh Zimmer MD   
   
                                                    Laboratory - Chemistry and C  
hemistry - challengeOrdered By: Malika Shepherd on   
2021   
   
                                                    GFR/1.73 sq M.predicted   
MDRD (S/P/Bld) [Vol   
rate/Area]                                                      [facility]   
Phone:   
[phone]  
   
                                        Comment on above:   Average GFR for 20-2  
9 years old:  
116 mL/min/1.73sq m  
Chronic Kidney Disease:  
<60 mL/min/1.73sq m  
Kidney failure:  
<15 mL/min/1.73sq m  
  
  
eGFR calculated using average adult body mass. Additional eGFR   
calculator available at:  
  
http://www.3LM/multiple_crcl_2012.htm  
  
  
   
   
                                                            Stage 1: Some kidney  
 damage normal GFR  
Stage 2: Mild kidney damage GFR 60-89  
Stage 3: Moderate kidney damage GFR 30-59  
Stage 4: Severe kidney damage GFR 15-29  
Stage 5: Severe kidney damage GFR <15  
ESRD - chronic treatment by dialysis or transplant  
  
  
   
   
                                                    Microscopic UrinalysisOrdere  
d By: Malika Shepherd on 2021   
   
                      -                                           [employer]   
Phone:   
[phone]  
   
                      Amorphous, UA NOT REPORTED            None       [employer]  
Work   
Phone:   
[phone]  
   
                      Bacteria, UA TRACE      Abnormal   None       [employer]   
Phone:   
[phone]  
   
                      Casts UA   NOT REPORTED            /LPF       [employer]  
Work   
Phone:   
[phone]  
   
                      Crystals, UA NOT REPORTED            None /HPF  [employer]   
Phone:   
[phone]  
   
                      Epithelial Cells UA 2 TO 5                           [facility]  
   
[employer]   
Phone:   
[phone]  
   
                                                    Interpretation and   
review of laboratory   
results         Abnormal                                        [facility]   
Phone:   
[phone]  
   
                      Mucus, UA  TRACE      Abnormal   None       [facility]   
[employer]   
Phone:   
[phone]  
   
                      Other Observations UA NOT REPORTED            NOT REQ.   M  
[facility]   
[employer]  
Work   
Phone:   
[phone]  
   
                      RBC, UA    0 TO 2                           [facility]   
[employer]  
Work   
Phone:   
[phone]  
   
                      Renal Epithelial, UA NOT REPORTED            0 /HPF     [facility]  
[facility]   
[employer]  
Work   
Phone:   
[phone]  
   
                      Trichomonas, UA NOT REPORTED            None       [facility]   
[employer]   
Phone:   
[phone]  
   
                      WBC, UA    0 TO 2                           [facility]   
[employer]   
Phone:   
[phone]  
   
                      Yeast, UA  NOT REPORTED            None       [facility]   
[facility] [facility]   
Phone:   
[phone]  
   
                                                                  [facility]   
[facility]   
Phone:   
[phone]  
   
                                                    No Panel InformationOrdered   
By: Malika Shepherd on 2021   
   
                                                    Interpretation and   
review of laboratory   
results         Abnormal                                        [facility]   
Phone:   
[phone]  
   
                                                                  [facility]   
[facility]   
Phone:   
[phone]  
   
                                                    SARS-CoV-2on 2021   
   
                                                    SARS-CoV-2 (COVID-19)   
RNA PAMELA+probe Ql (Unsp   
spec)           Not detected    Normal          [facility]  
   
                                        Comment on above:   Result Comment:  
Rapid NAAT: The specimen is NEGATIVE for SARS-CoV-2, the novel   
coronavirus  
associated with COVID-19.  
The ID NOW COVID-19 assay is designed to detect the virus that   
causes COVID-19  
in patients with signs and symptoms of infection who are   
suspected of COVID-19.  
An individual without symptoms of COVID-19 and who is not   
shedding SARS-CoV-2  
virus would expect to have a negative (not detected) result in   
this assay.  
Negative results should be treated as presumptive and, if   
inconsistent with  
clinical signs and symptoms or necessary for patient   
management,  
should be tested with an alternative molecular assay. Negative   
results do not  
preclude SARS-CoV-2 infection and  
should not be used as the sole basis for patient management   
decisions.  
Fact sheet for Healthcare Providers:   
https://www.fda.gov/media/444325/download  
Fact sheet for Patients:   
https://www.fda.gov/media/449989/download  
Methodology: Isothermal Nucleic Acid Amplification   
   
                                                            Performed By: #### C  
OVRB ####  
[facility]  
[address] [place] [address] [phone]  
: Haresh Zimmer MD   
   
                                                    Salicylateon 2021   
   
                      Salicylate <1         Low        3-10       [facility]  
   
                                        Comment on above:   Performed By: #### C  
DP, SALI, CP, HCG ####  
[facility]  
[address] [place] [address] [phone]  
: Haresh Zimmer MD   
   
                                                    SalicylateOrdered By: Malika matta on 2021   
   
                          Salicylate Lvl <1           Low          3 - 10   
mg/dL                                   [facility]  
Work   
Phone:   
[phone]  
   
                                                    UA w/Reflex Cultureon 2021   
   
                      Bilirubin, SemiQt,Ur Negative   Normal     NEG        [facility]  
   
                                        Comment on above:   Performed By: #### C  
OVRB ####  
[facility]  
[address] [place] [address] [phone]  
: Haresh Zimmer MD   
   
                      Blood, Urine Negative   Normal     NEG        [facility]  
   
                                        Comment on above:   Performed By: #### C  
OVRB ####  
[facility]  
[address] [place] [address] [phone]  
: Haresh Zimmer MD   
   
                      Clarity (U) CLEAR      Normal     CLEAR      [facility]  
   
                                        Comment on above:   Performed By: #### C  
OVRB ####  
[facility]  
[address] [place] [address] [phone]  
: Haresh Zimmer MD   
   
                      Color (U)  YELLOW     Normal     YEL        [facility]  
   
                                        Comment on above:   Performed By: #### C  
OVRB ####  
[facility]  
[address] [place] [address] [phone]  
: Haresh Zimmer MD   
   
                      Glucose Ql (U) Negative   Normal     NEG        [facility]  
   
                                        Comment on above:   Performed By: #### C  
OVRB ####  
[facility]  
[address] [place] [address] [address] [place], [place] [id number] [phone]  
: Haresh Zimmer MD   
   
                      Ketones Ql (U) Negative   Normal     NEG        [facility]  
   
                                        Comment on above:   Performed By: #### C  
OVRB ####  
[facility]  
[address] [place] [address] [address] [place], [place] [address] [phone]  
: Haresh Zimmer MD   
   
                                                    Leukocyte esterase Test   
strip Ql (U)    Negative        Normal          NEG             [facility]  
   
                                        Comment on above:   Performed By: #### C  
OVRB ####  
[address] [place], [place] [address] [phone]  
: Haresh Zimmer MD   
   
                      Nitrite,Ur Negative   Normal     NEG        [facility]  
   
                                        Comment on above:   Performed By: #### C  
OVRB ####  
[facility]  
[address] [place] [address] [address] [place], [place] [address] [phone]  
: Haresh Zimmer MD   
   
                      PH,Ur      8.5        Normal     5.0-9.0    [facility]  
   
                                        Comment on above:   Performed By: #### C  
OVRB ####  
[address] [place], [place] [address] [phone]  
: Haresh Zimmer MD   
   
                      Protein Ql (U) Negative   Normal     [facility]  
   
                                        Comment on above:   Performed By: #### C  
OVRB ####  
[facility]  
[address] [place] [address] [address] [place], [place] [address] [phone]  
: Haresh Zimmer MD   
   
                      Spec. Gravity,Ur 1.015      Normal     1.010-1.020 [facility]  
   
                                        Comment on above:   Performed By: #### C  
OVRB ####  
[address] [place] [address] [phone]  
: Haresh Zimmer MD   
   
                      Urobilinogen,Ur Normal     Normal     NORM       [facility]  
   
                                        Comment on above:   Performed By: #### C  
OVRB ####  
[facility]  
[address] [place] [address] [phone]  
: Haresh Zimmer MD   
   
                      Comment    NOT REPORTED Normal                [facility]  
   
                                        Comment on above:   Performed By: #### C  
OVRB ####  
[facility]  
[address] [place] [address] [phone]  
: Haresh Zimmer MD   
   
                                                    Urinalysis Reflex to Culture  
Ordered By: Malika Shepherd on 2021   
   
                      Bilirubin Urine Negative              NEGATIVE   [facility]   
[facility]   
Phone:   
[phone]  
   
                      Color, UA  YELLOW                YELLOW     [facility]   
[employer]  
Work   
Phone:   
[phone]  
   
                      Glucose, Ur Negative              NEGATIVE   [facility]   
[facility]   
Phone:   
[phone]  
   
                      Ketones Ql (U) Negative              NEGATIVE   [facility]   
[facility]   
Phone:   
[phone]  
   
                                                    Leukocyte esterase Test   
strip Ql (U)    Negative                        NEGATIVE        [facility]   
[facility]   
Phone:   
[phone]  
   
                      Nitrite, Urine Negative              NEGATIVE   [facility]   
[facility]   
Phone:   
[phone]  
   
                      pH, UA     8.5                              [facility]   
[employer]  
Work   
Phone:   
[phone]  
   
                      Protein, UA Negative              NEGATIVE   [facility]   
[employer]   
Phone:   
[phone]  
   
                      Specific [place], UA 1.015                            [facility]   
[facility]   
Phone:   
[phone]  
   
                      Turbidity UA CLEAR                 CLEAR      [facility]   
[employer] [facility]   
Phone:   
[phone]  
   
                      Urinalysis Comments NOT REPORTED                       [facility]  
   
[employer]  
Work   
Phone:   
[phone]  
   
                      Urine Hgb  Negative              NEGATIVE   [facility]   
[employer] [facility]   
Phone:   
[phone]  
   
                      Urobilinogen, Urine Normal                Normal     [facility]  
   
[facility]   
Phone:   
[phone]  
   
                                                                  [facility]   
[employer]  
Work   
Phone:   
[phone]  
   
                                                    Urinalysis,Microon   
1   
   
                      -----                 Normal                [facility]  
   
                                        Comment on above:   Performed By: #### C  
OVRB ####  
[facility]  
[address] [place] [address] [phone]  
: Haresh Zimmer MD   
   
                      Bacteria   TRACE      Abnormal   [facility]  
   
                                        Comment on above:   Performed By: #### C  
OVRB ####  
[facility]  
[address] [place] [address] [address] [place], [place] [address] [phone]  
: Haresh Zimmer MD   
   
                                                    Epithelial cells LM Ql   
(Urine sed)     2 TO 5          Normal          0-25            [facility]  
   
                                        Comment on above:   Performed By: #### C  
OVRB ####  
[facility]  
[address] [place], [place] [address] [phone]  
: Haresh Zimmer MD   
   
                      Mucus Strands TRACE      Abnormal   [facility]  
   
                                        Comment on above:   Performed By: #### C  
OVRB ####  
[address] [place], [place] [address] [phone]  
: Haresh Zimmer MD   
   
                      Urine RBC's 0 TO 2     Normal     0-2        [facility]  
   
                                        Comment on above:   Performed By: #### C  
OVRB ####  
[facility]  
[address] [place] [address] [address] [place], [place] [address] [phone]  
: Haresh Zimmer MD   
   
                      Urine WBC's 0 TO 2     Normal     0-5        [facility]  
   
                                        Comment on above:   Performed By: #### C  
OVRB ####  
[address] [place], [place] [address] [phone]  
: Haresh Zimmer MD   
   
                                                    Amorphous sediment LM   
Ql (Urine sed)  NOT REPORTED    Normal          [facility]  
   
                                        Comment on above:   Performed By: #### C  
OVRB ####  
[facility]  
[address] [place] [address] [address] [place], [place] [address] [phone]  
: Haresh Zimmer MD   
   
                      Casts      NOT REPORTED Normal                [facility]  
   
                                        Comment on above:   Performed By: #### C  
OVRB ####  
[facility]  
[address] [place] [address] [address] [place], [place] [address] [phone]  
: Haresh Zimmer MD   
   
                                                    Crystals LM Nom (Urine   
sed)            NOT REPORTED    Normal          [facility]  
   
                                        Comment on above:   Performed By: #### C  
OVRB ####  
[facility]  
[address] [place] [address]  
[phone]  
: Haresh Zimmer MD   
   
                      Epithelial, Renal NOT REPORTED Normal     0          [facility]  
   
                                        Comment on above:   Performed By: #### C  
OVRB ####  
[facility]  
[address] [place], [place] [address] [phone]  
: Haresh Zimmer MD   
   
                      Other Observations NOT REPORTED Normal     NREQ       [facility]  
   
                                        Comment on above:   Performed By: #### C  
OVRB ####  
[facility]  
[address] [place] [address]  
[phone]  
: Haresh Zimmer MD   
   
                      Trichomonas NOT REPORTED Normal     NONE       [facility]  
   
                                        Comment on above:   Performed By: #### C  
OVRB ####  
[facility]  
[address] [place], [place] [address] [phone]  
: Haresh Zimmer MD   
   
                      Yeast      NOT REPORTED Normal     NONE       [facility]  
   
                                        Comment on above:   Performed By: #### C  
OVRB ####  
[facility]  
[address] [place], [place] [address] [phone]  
: Haresh Zimmer MD   
   
                                                    Urine Drug ScreenOrdered By:  
 Malika Shepherd on 2021   
   
                      Amphetamine Screen, Ur Negative              NEGATIVE   [facility]   
[employer]  
Work   
Phone:   
[phone]  
   
                      Barbiturate Screen, Ur Negative              NEGATIVE   [facility]  
Work   
Phone:   
[phone]  
   
                                                    Benzodiazepine Screen,   
Urine           Negative                        NEGATIVE        [facility]  
Work   
Phone:   
[phone]  
   
                      Buprenorphine Urine Negative              NEGATIVE   [facility]   
Phone:   
[phone]  
   
                      Cannabinoid Scrn, Ur Positive   Abnormal   NEGATIVE   [facility]   
Health  
Work   
Phone:   
[phone]  
   
                                                    Cocaine Metabolite,   
Urine           Negative                        NEGATIVE        [facility]  
Work   
Phone:   
[phone]  
   
                                                    Interpretation and   
review of laboratory   
results         Abnormal                                        [facility]   
[employer]   
Phone:   
[phone]  
   
                      MDMA, Urine NOT REPORTED            NEGATIVE   [facility]  
[employer]   
Phone:   
[phone]  
   
                      Methadone Screen, Urine Negative              NEGATIVE   M  
[facility]   
[employer]  
Work   
Phone:   
[phone]  
   
                      Methamphetamine, Urine Negative              NEGATIVE   Me  
[facility]   
[employer]  
Work   
Phone:   
[phone]  
   
                      Opiates, Urine Negative              NEGATIVE   [facility]   
[employer]  
Work   
Phone:   
[phone]  
   
                      Oxycodone Screen, Ur Negative              NEGATIVE   [facility]   
Health  
Work   
Phone:   
[phone]  
   
                      Phencyclidine, Urine Negative              NEGATIVE   [facility]   
Health  
Work   
Phone:   
[phone]  
   
                      Propoxyphene, Urine Negative              NEGATIVE   [facility]  
   
[employer]  
Work   
Phone:   
[phone]  
   
                      Test Information NOT REPORTED                       [facility]   
[facility]   
Phone:   
[phone]  
   
                                                    Tricyclic   
Antidepressants, Urine Negative                        NEGATIVE        [facility]   
[facility]   
Phone:   
[phone]  
   
                                        Comment on above:   Drug screen results   
are to be used for medical purposes only.   
All positive results are  
unconfirmed. Testing for employment or legal uses should be   
sent to a reference laboratory  
for confirmation.  
  
   
   
                                                                  [facility]   
Phone:   
[phone]  
   
                                                    Laboratory - Microbiology an  
d Antimicrobial susceptibilityOrdered By: Doreen Cabrera on   
2021   
   
                                                    SARS-CoV-2 (COVID-19)   
RNA PAMELA+probe Ql (Resp) Not detected                            (Not   
Detected )                              Health   
[facility]  
Work   
Phone:   
[phone]  
   
                                        Comment on above:   Note: This nucleic a  
mallorie amplification test was developed and   
itsperformance characteristics determined by   
[facility]. Nucleic acid amplification tests include   
RT-PCR and TMA. This test has not been FDA cleared orapproved.   
This test has been authorized by FDA under anEmergency Use   
Authorization (EUA). This test is onlyauthorized for the   
duration of time the declaration thatcircumstances exist   
justifying the authorization of theemergency use of in vitro   
diagnostic tests for detection [id number] virus and/or   
diagnosis of COVID-19 infectionunder section 564(b)(1) of the   
Act, 21 U.S.C. 360bbb-3(b)(1), unless the authorization is   
terminated or revokedsooner.When diagnostic testing is   
negative, the possibility of afalse negative result should be   
considered in the contextof a patient's recent exposures and   
the presence ofclinical signs and symptoms consistent with   
COVID-19. Anindividual without symptoms of COVID-19 and who is   
notshedding SARS-CoV-2 virus would expect to have a   
negative(not detected) result in this assay.   
   
                                                    SARS-CoV-2 (COVID-19)   
RNA PAMELA+probe Ql (Unsp   
spec)           Performed                                       Health   
[facility]  
Work   
Phone:   
[phone]  
   
                                                    Vitamin Don 2021   
   
                      Vitamin D  14.7 ng/mL Low        30.0-100.0 [facility]  
   
                                        Comment on above:   Result Comment: (<20  
 ng/mL) Deficiency  
This assay accurately quantifies the sum of vitamin D3,   
25-Hydroxy and  
vitamin D2, 25-Hyroxy.   
   
                                                            Performed By: #### V  
ITD ####  
[facility]  
[address] [place] [place] [address]  
[phone]   
   
                                                    CBC With Platelet No Differe  
ntialon 2021   
   
                                                    Erythrocyte   
distribution width   
(RBC) [Ratio]   13.6 %          Normal          11.5-14.5       [facility]  
   
                                        Comment on above:   Performed By: #### C  
BCND ####  
[facility]  
[address] [place] [address]  
[phone]   
   
                                                    Hematocrit (Bld)   
[Volume fraction] 43.5 %          Normal          37.0-47.0       [facility]  
   
                                        Comment on above:   Performed By: #### C  
BCND ####  
[facility]  
[address] [place] [address]  
[phone]   
   
                                                    Hemoglobin (Bld)   
[Mass/Vol]      14.6 g/dL       Normal          12.0-16.0       [facility]  
   
                                        Comment on above:   Performed By: #### C  
BCND ####  
[facility]  
[address] [place] [address]  
[phone]   
   
                                                    MCH (RBC) [Entitic   
mass]           28.6 pg         Normal          27.0-31.3       [facility]  
   
                                        Comment on above:   Performed By: #### C  
BCND ####  
[facility]  
[address] [place] [address]  
[phone]   
   
                      MCHC       33.5 %     Normal     33.0-37.0  [facility]  
   
                                        Comment on above:   Performed By: #### C  
BCND ####  
[facility]  
[address] [place] [address]  
[phone]   
   
                      MCV (RBC) [Entitic vol] 85.4 fL    Normal     82.0-100.0 M  
[facility]  
   
                                        Comment on above:   Performed By: #### C  
BCND ####  
[facility]  
[address] [place] [address]  
[phone]   
   
                      Platelets (Bld) [#/Vol] 284 10*3/uL Normal     130-400      
[facility]  
   
                                        Comment on above:   Performed By: #### C  
BCND ####  
[facility]  
[address] [place] [address]  
[phone]   
   
                      RBC (Bld) [#/Vol] 5.10 10*6/uL Normal     4.20-5.40  [facility]  
   
                                        Comment on above:   Performed By: #### C  
BCND ####  
[facility]  
[address] [place] [address]  
[phone]   
   
                      WBC (Bld) [#/Vol] 11.1 10*3/uL Critically high 4.8-10.8     
[facility]  
   
                                        Comment on above:   Performed By: #### C  
BCND ####  
[facility]  
[address] [place] [address]  
[phone]   
   
                                                    Folateon 2021   
   
                      Folate     15.3 ng/mL Normal     7.3-26.1   [facility]  
   
                                        Comment on above:   Result Comment: As o  
f 8/10/16, the methodology has changed.   
Results from  
this methodology should not be compared with results from  
previous methodology.   
   
                                                            Performed By: #### F  
OLAT ####  
[facility]  
[address] [place] [address]  
[phone]   
   
                                                    Lipid Panelon 2021   
   
                      Cholesterol [Mass/Vol] 127 mg/dL  Normal     0-199      [facility]  
   
                                        Comment on above:   Result Comment: ATP   
III Cholesterol classification is   
Desirable.   
   
                                                            Performed By: #### L  
IPID ####  
[facility]  
[address] [place] [address]  
[phone]   
   
                                                    Cholesterol in HDL   
[Mass/Vol]      32 mg/dL        Low             40-59           [facility]  
   
                                        Comment on above:   Result Comment: ATP   
III HDL Cholestrol Classification is low.  
Expected Values:  
Males: >55 = No Risk  
35-55 = Moderate Risk  
<35 = High Risk  
Females: >65 = No Risk  
45-65 = Moderate Risk  
<45 = High Risk  
NCEP Guidelines: Third Report May 2001  
>59 = negative risk factor for CHD  
<40 = major risk factor for CHD   
   
                                                            Performed By: #### L  
IPID ####  
[facility]  
[address] [place] [address]  
[phone]   
   
                                                    Cholesterol in LDL   
[Mass/Vol]      75 mg/dL        Normal          0-129           [facility]  
   
                                        Comment on above:   Result Comment: ATP   
III LDL Classification is Optimal.   
   
                                                            Performed By: #### L  
IPID ####  
[facility]  
[address] [place] [address]  
[phone]   
   
                      Triglyceride [Mass/Vol] 100 mg/dL  Normal     0-150      M  
[facility]  
   
                                        Comment on above:   Result Comment: ATP   
III Triglycerides Classification is   
Normal.   
   
                                                            Performed By: #### L  
IPID ####  
[facility]  
[address] [place] [address]  
[phone]   
   
                                                    TSH w/out Reflexon    
   
                      TSH w/out Reflex 1.020 uIU/mL Normal     0.440-3.86 [facility]  
   
                                        Comment on above:   Performed By: #### T  
SH ####  
[facility]  
[address] [place] [address]  
[phone]   
   
                                                    Vitamin B12on 2021   
   
                                                    Cobalamin (Vitamin B12)   
[Mass/Vol]      1050 pg/mL      Normal          232-1245        [facility]  
   
                                        Comment on above:   Performed By: #### B  
12 ####  
[facility]  
[address] [place] [address]  
[phone]   
   
                                                    EKG 12 LeadOrdered By: Cuca Gallagher on 2021   
   
                      Atrial Rate 75                    BPM        [facility]   
Phone:   
[phone]  
   
                      P Axis     24                    degrees    [facility]   
Phone:   
[phone]  
   
                      P-R Interval 140 ms                           [facility]   
Phone:   
[phone]  
   
                      Q-T Interval 384 ms                           [facility]   
Phone:   
[phone]  
   
                      QRS Duration 90 ms                            [facility]   
Phone:   
[phone]  
   
                                                    QTc Calculation   
(Bazett)        414 ms                                          [facility]   
Phone:   
[phone]  
   
                      R Axis     29                    degrees    [facility]   
Phone:   
[phone]  
   
                      T Axis     20                    degrees    [facility]   
Phone:   
[phone]  
   
                      Ventricular Rate 70                    BPM        [facility]   
Phone:   
[phone]  
   
                                                            Normal sinus rhythm   
with   
sinus arrhythmia  
Possible Anterior infarct   
, age undetermined  
Abnormal ECG  
No previous ECGs   
available  
Confirmed by KRANTHI SU ([id number]) on   
2021 12:40:27 AM  
                                                            [facility]   
Phone:   
[phone]  
   
                                                            [place], [facility] Incoming E  
kg   
Results From Ge Gilboa -   
2021 12:40 AM EDT   
Formatting of this note   
might be different from   
the original.  
Normal sinus rhythm with   
sinus arrhythmia  
Possible Anterior infarct   
, age undetermined  
Abnormal ECG  
No previous ECGs   
available  
Confirmed by KRANTHI SU ([id number]) on   
2021 12:40:27 AM                                         [facility]   
Phone:   
[phone]  
   
                                                                  [facility]   
Phone:   
[phone]  
   
                                                    Acetaminophenon 2021   
   
                                                    Acetaminophen   
[Mass/Vol]      ug/mL           Low             10-30           [facility]  
   
                                        Comment on above:   Performed By: #### A  
LCB, ACET ####  
[facility]  
[address] [place] [address] [place] [address] [phone]  
: Haresh Zimmer MD   
   
                                                    Acetaminophen LevelOrdered B  
y: Rafael Gallagher on 2021   
   
                          Acetaminophen Level <5           Low          10 - 30   
ug/mL                                   [facility]   
Phone:   
[phone]  
   
                                                    Interpretation and   
review of laboratory   
results         Abnormal                                        [facility]   
Phone:   
[phone]  
   
                                                                  [facility]   
Phone:   
[phone]  
   
                                                    CBC Auto DifferentialOrdered  
 By: Rafael Gallagher on 2021   
   
                      Absolute Eos # 0.07                             [facility]   
Phone:   
[phone]  
   
                                                    Absolute Immature   
Granulocyte     0.03                                            [facility]   
Phone:   
[phone]  
   
                      Absolute Lymph # 2.20                             [facility]   
Phone:   
[phone]  
   
                      Absolute Mono # 0.76                             [facility]   
Phone:   
[phone]  
   
                      Basophils (Bld) [#/Vol] 0.05 10*3/uL                        
 [facility]   
Phone:   
[phone]  
   
                      Basophils/100 WBC (Bld) 0 %                   0 - 2 %    M  
[facility]   
Phone:   
[phone]  
   
                      Differential Type NOT REPORTED                       [facility]   
Phone:   
[phone]  
   
                                                    Eosinophils/100 WBC   
(Bld)           1 %                             1 - 4 %         [facility]   
Phone:   
[phone]  
   
                                                    Hematocrit (Bld)   
[Volume fraction]   45.2 %                                  36.3 - 47.1   
%                                       [facility]   
Phone:   
[phone]  
   
                                                    Hemoglobin.gastrointest  
inal spec 1 Ql (Stl) 15.0 g/dL                               11.9 - 15.1   
g/dL                                    [facility]   
Phone:   
[phone]  
   
                                                    Immature   
granulocytes/100 WBC   
(Bld)           0 %                             0               [facility]   
Phone:   
[phone]  
   
                                                    Interpretation and   
review of laboratory   
results         Abnormal                                        [facility]   
Phone:   
[phone]  
   
                                                    Lymphocytes/100 WBC   
(Bld)           18 %            Low             24 - 43 %       [facility]   
Phone:   
[phone]  
   
                                                    MCH (RBC) [Entitic   
mass]               28.6 pg                                 25.2 - 33.5   
pg                                      [facility]   
Phone:   
[phone]  
   
                          MCHC (RBC) [Mass/Vol] 33.2 g/dL                 28.4 -  
 34.8   
g/dL                                    [facility]   
Phone:   
[phone]  
   
                          MCV (RBC) [Entitic vol] 86.3 fL                   82.6  
 -   
102.9 fL                                [facility]   
Phone:   
[phone]  
   
                      Monocytes/100 WBC (Bld) 6 %                   3 - 12 %   M  
[facility]   
Phone:   
[phone]  
   
                          NRBC Automated 0.0                       0.0 per 100   
WBC                                     [facility]   
Phone:   
[phone]  
   
                                                    Platelet distribution   
width (Bld) [Ratio] 13.0 %                                  11.8 - 14.4   
%                                       [facility]   
Phone:   
[phone]  
   
                      Platelet Estimate NOT REPORTED                       [facility]   
Phone:   
[phone]  
   
                                                    Platelet mean volume   
(Bld) [Entitic vol] 9.1 fL                                  8.1 - 13.5   
fL                                      [facility]   
Phone:   
[phone]  
   
                      Platelets (Bld) [#/Vol] 296 10*3/uL                         
[facility]   
Phone:   
[phone]  
   
                          RBC (Bld) [#/Vol] 5.24 10*6/uL High         3.95 - 5.1  
1   
m/uL                                    [facility]   
Phone:   
[phone]  
   
                      RBC (Bld) [#/Vol] NOT REPORTED                       [facility]   
Phone:   
[phone]  
   
                                                    Segmented   
neutrophils/100 WBC   
(Bld)           75 %            High            36 - 65 %       [facility]   
Phone:   
[phone]  
   
                      Segs Absolute 9.07       High                  [facility]   
Phone:   
[phone]  
   
                      WBC (Bld) [#/Vol] 12.2 10*3/uL High                  [facility]   
Phone:   
[phone]  
   
                      WBC (Bld) [#/Vol] NOT REPORTED                       [facility]   
Phone:   
[phone]  
   
                                                                  [facility]   
Phone:   
[phone]  
   
                                                    CBC with Diffon 2021   
   
                      Abs. Basophil 0.05 k/uL  Normal     0.00-0.20  [facility]  
   
                                        Comment on above:   Performed By: #### C  
OVRB ####  
[facility]  
[address] [place] [address]  
[phone]  
: Haresh Zimmer MD   
   
                      Abs.Imm.Granulocyte 0.03 k/uL  Normal     0.00-0.30  [facility]  
   
                                        Comment on above:   Performed By: #### C  
OVRB ####  
[facility]  
[address] [place], [place] [address] [phone]  
: Haresh Zimmer MD   
   
                      Abs.Neutrophil (Seg) 9.07 k/uL  High       1.50-8.10  [facility]  
   
                                        Comment on above:   Performed By: #### C  
OVRB ####  
[facility]  
[address] [place] [address] [address] [place] [id number] [phone]  
: Haresh Zimmer MD   
   
                      Basophils/100 WBC (Bld) 0 %        Normal     0-2        [facility]  
   
                                        Comment on above:   Performed By: #### C  
OVRB ####  
[facility]  
[address] [place] [address] [address] [place] [address] [phone]  
: Haresh Zimmer MD   
   
                                                    Eosinophils (Bld)   
[#/Vol]         0.07 10*3/uL    Normal          0.00-0.44       [facility]  
   
                                        Comment on above:   Performed By: #### C  
OVRB ####  
[address] [place] [facility] [phone]  
: Haresh Zimmer MD   
   
                                                    Eosinophils/100 WBC   
(Bld)           1 %             Normal          1-4             [facility]  
   
                                        Comment on above:   Performed By: #### C  
OVRB ####  
[address] [place], [place] [address] [phone]  
: Haresh Zimmer MD   
   
                                                    Erythrocyte   
distribution width   
(RBC) [Ratio]   13.0 %          Normal          11.8-14.4       [facility]  
   
                                        Comment on above:   Performed By: #### C  
OVRB ####  
[address] [place] [address] [phone]  
: Haresh Zimmer MD   
   
                                                    Hematocrit (Bld)   
[Volume fraction] 45.2 %          Normal          36.3-47.1       [facility]  
   
                                        Comment on above:   Performed By: #### C  
OVRB ####  
[address] [place], [place] [address] [phone]  
: Haresh Zimmer MD   
   
                                                    Hemoglobin (Bld)   
[Mass/Vol]      15.0 g/dL       Normal          11.9-15.1       [facility]  
   
                                        Comment on above:   Performed By: #### C  
OVRB ####  
[facility]  
[address] [place], [place] [address] [phone]  
: Haresh Zimmer MD   
   
                                                    Immature   
granulocytes/100 WBC   
(Bld)           0 %             Normal          0               [facility]  
   
                                        Comment on above:   Performed By: #### C  
OVRB ####  
[facility]  
[address] [place], [facility] [phone]  
: Haresh Zimmer MD   
   
                                                    Lymphocytes (Bld)   
[#/Vol]         2.20 10*3/uL    Normal          1.10-3.70       [facility]  
   
                                        Comment on above:   Performed By: #### C  
OVRB ####  
[address] [place], [place] [address] [phone]  
: Haresh Zimmer MD   
   
                                                    Lymphocytes/100 WBC   
(Bld)           18 %            Low             24-43           [facility]  
   
                                        Comment on above:   Performed By: #### C  
OVRB ####  
[address] [place], [place] [address] [phone]  
: Haresh Zimmer MD   
   
                                                    MCH (RBC) [Entitic   
mass]           28.6 pg         Normal          25.2-33.5       [facility]  
   
                                        Comment on above:   Performed By: #### C  
OVRB ####  
[address] [place], [facility] [phone]  
: Haresh Zimmer MD   
   
                      MCHC (RBC) [Mass/Vol] 33.2 g/dL  Normal     28.4-34.8  [facility]  
   
                                        Comment on above:   Performed By: #### C  
OVRB ####  
[address] [place] [address] [phone]  
: Haresh Zimmer MD   
   
                      MCV (RBC) [Entitic vol] 86.3 fL    Normal     82.6-102.9 M  
[facility]  
   
                                        Comment on above:   Performed By: #### C  
OVRB ####  
[facility]  
[address] [place] [address] [phone]  
: Haresh Zimmer MD   
   
                      Monocytes (Bld) [#/Vol] 0.76 10*3/uL Normal     0.10-1.20   
 [facility]  
   
                                        Comment on above:   Performed By: #### C  
OVRB ####  
[facility]  
[address] [place], [place] [address] [phone]  
: Haresh Zimmer MD   
   
                      Monocytes/100 WBC (Bld) 6 %        Normal     3-12       M  
[facility] [facility]  
   
                                        Comment on above:   Performed By: #### C  
OVRB ####  
[facility]  
[address] [place], [place] [address] [phone]  
: Haresh Zimmer MD   
   
                      Neutrophil (Seg) 75 %       High       36-65      [facility]  
   
                                        Comment on above:   Performed By: #### C  
OVRB ####  
[facility]  
[address] [place], [place] [address] [phone]  
: Haresh Zimmer MD   
   
                      NRBC Automated 0.0 per 100 WBC Normal     0.0        [facility]  
   
                                        Comment on above:   Performed By: #### C  
OVRB ####  
[address] [place], [place] [address] [phone]  
: Haresh Zimmer MD   
   
                                                    Platelet mean volume   
(Bld) [Entitic vol] 9.1 fL          Normal          8.1-13.5        [facility]  
   
                                        Comment on above:   Performed By: #### C  
OVRB ####  
[facility]  
[address] [place], [place] [address] [phone]  
: Haresh Zimmer MD   
   
                      Platelets (Bld) [#/Vol] 296 10*3/uL Normal     138-453      
[facility]  
   
                                        Comment on above:   Performed By: #### C  
OVRB ####  
[facility]  
[address] [place], [place] [address] [phone]  
: Haresh Zimmer MD   
   
                      RBC (Bld) [#/Vol] 5.24 10*6/uL High       3.95-5.11  [facility]  
   
                                        Comment on above:   Performed By: #### C  
OVRB ####  
[facility]  
[address] [place] [address]  
[phone]  
: Haresh Zimmer MD   
   
                      WBC (Bld) [#/Vol] 12.2 10*3/uL High       3.5-11.3   [facility]  
   
                                        Comment on above:   Performed By: #### C  
OVRB ####  
[facility]  
[address] [place] [address]  
[phone]  
: Haresh Zimmer MD   
   
                      Auto Diff Performed NOT REPORTED Normal                [facility]  
   
                                        Comment on above:   Performed By: #### C  
OVRB ####  
[address] [place] [address]  
[phone]  
: Haresh Zimmer MD   
   
                      Platelet Estimate NOT REPORTED Normal                [facility]  
   
                                        Comment on above:   Performed By: #### C  
OVRB ####  
[facility]  
[address] [place] [address] [place] [address]  
[phone]  
: Haresh Zimmer MD   
   
                                                    RBC morphology finding   
Nom (Bld)       NOT REPORTED    Normal                          [facility]  
   
                                        Comment on above:   Performed By: #### C  
OVRB ####  
[address] [place] [address]  
[phone]  
: Haresh Zimmer MD   
   
                      WBC Morphology NOT REPORTED Normal                [facility]  
   
                                        Comment on above:   Performed By: #### C  
OVRB ####  
[facility]  
[address] [place] [address] [place] [address]  
[phone]  
: Haresh Zimmer MD   
   
                                                    COVID-19, RapidOrdered By: PIPO Gallagher on 2021   
   
                                                    SARS-CoV-2 (COVID-19)   
RNA PAMELA+probe Ql (Unsp   
spec)               Not detected                            Not   
Detected                                [facility]  
Work   
Phone:   
[phone]  
   
                                        Comment on above:     
Rapid NAAT: The specimen is NEGATIVE for SARS-CoV-2, the novel   
coronavirus associated with  
COVID-19.  
  
The ID NOW COVID-19 assay is designed to detect the virus that   
causes COVID-19 in patients  
with signs and symptoms of infection who are suspected of   
COVID-19.  
An individual without symptoms of COVID-19 and who is not   
shedding SARS-CoV-2 virus would  
expect to have a negative (not detected) result in this assay.  
Negative results should be treated as presumptive and, if   
inconsistent with clinical signs  
and symptoms or necessary for patient management,  
should be tested with an alternative molecular assay. Negative   
results do not preclude  
SARS-CoV-2 infection and  
should not be used as the sole basis for patient management   
decisions.  
  
Fact sheet for Healthcare Providers:   
https://www.fda.gov/media/504724/download  
Fact sheet for Patients:   
https://www.fda.gov/media/251187/download  
  
Methodology: Isothermal Nucleic Acid Amplification  
  
   
   
                      Specimen Description .NASOPHARYNGEAL SWAB                   
      [facility]  
[employer]   
Phone:   
[phone]  
   
                                                                  [facility]  
[employer]   
Phone:   
[phone]  
   
                                                    Comp Metabolic Profon 2021   
   
                      (cont.)               Normal                [facility]  
   
                                        Comment on above:   Result Comment: Aver  
age GFR for 20-29 years old:  
116 mL/min/1.73sq m  
Chronic Kidney Disease:  
<60 mL/min/1.73sq m  
Kidney failure:  
<15 mL/min/1.73sq m  
eGFR calculated using average adult body mass. Additional eGFR   
calculator  
available at:  
http://www.3LM/multiple_crcl_2012.htm   
   
                                                            Performed By: #### D  
AU ####  
[facility]  
[address] [place] [address] [phone]  
: Haresh Zimmer MD   
   
                      Albumin [Mass/Vol] 4.5 g/dL   Normal     3.5-5.2    [facility]  
   
                                        Comment on above:   Performed By: #### D  
AU ####  
[facility]  
[address] [place] [address] [phone]  
: Haresh Zimmer MD   
   
                      Albumin/Glob Ratio 1.6        Normal     1.0-2.5    [facility]  
   
                                        Comment on above:   Performed By: #### D  
AU ####  
[facility]  
[address] [place], [place] [address] [phone]  
: Haresh Zimmer MD   
   
                      Alkaline Phos 72 U/L     Normal          [facility]  
   
                                        Comment on above:   Performed By: #### D  
AU ####  
[facility]  
[address] [place], [place] [address] [phone]  
: Haresh Zimmer MD   
   
                                                    ALT [Catalytic   
activity/Vol]   71 U/L          High            5-33            [facility]  
   
                                        Comment on above:   Performed By: #### D  
AU ####  
[facility]  
[address] [place], [place] [address] [phone]  
: Haresh Zimmer MD   
   
                      Anion gap [Moles/Vol] 10 mmol/L  Normal     9-17       [facility]  
   
                                        Comment on above:   Performed By: #### D  
AU ####  
[facility]  
[address] [place], [place] [address] [phone]  
: Haresh Zimmer MD   
   
                                                    AST [Catalytic   
activity/Vol]   35 U/L          High            <32             [facility]  
   
                                        Comment on above:   Performed By: #### D  
AU ####  
[facility]  
[address] [place], [place] [address] [phone]  
: Haresh Zimmer MD   
   
                      Bilirubin [Mass/Vol] 0.34 mg/dL Normal     0.3-1.2    [facility]  
   
                                        Comment on above:   Performed By: #### D  
AU ####  
[facility]  
[address] [place], [place] [address] [phone]  
: Harseh Zimmer MD   
   
                      BUN/CRE Ratio 14         Normal     9-20       [facility]  
   
                                        Comment on above:   Performed By: #### D  
AU ####  
[facility]  
[address] [place], [place] [address] [phone]  
: Haresh Zimmer MD   
   
                      Calcium [Mass/Vol] 10.3 mg/dL Normal     8.6-10.4   [facility]  
   
                                        Comment on above:   Performed By: #### D  
AU ####  
[facility]  
[address] [place], [place] [address] [phone]  
: Haresh Zimmer MD   
   
                      Chloride [Moles/Vol] 103 mmol/L Normal          [facility]  
   
                                        Comment on above:   Performed By: #### D  
AU ####  
[facility]  
[address] [place], [place] [address] [phone]  
: Haresh Zimmer MD   
   
                      CO2 [Moles/Vol] 23 mmol/L  Normal     20-31      [facility]  
   
                                        Comment on above:   Performed By: #### D  
AU ####  
[facility]  
[address] [place], [place] [address] [phone]  
: Haresh Zimmer MD   
   
                      Creatinine [Mass/Vol] 0.65 mg/dL Normal     0.50-0.90  [facility]  
   
                                        Comment on above:   Performed By: #### D  
AU ####  
[facility]  
[address] [place], [place] [address] [phone]  
: Haresh Zimmer MD   
   
                      GFR, Amer >60        Normal     >60        [facility]  
   
                                        Comment on above:   Performed By: #### D  
AU ####  
[facility]  
[address] [place], [place] [address] [phone]  
: Haresh Zimmer MD   
   
                      GFR,non  Amer >60        Normal     >60        [facility]  
   
                                        Comment on above:   Performed By: #### D  
AU ####  
[facility]  
[address] [place], [place] [address] [phone]  
: Haresh Zimmer MD   
   
                      Glucose [Mass/Vol] 84 mg/dL   Normal     70-99      [facility]  
   
                                        Comment on above:   Performed By: #### D  
AU ####  
[facility]  
[address] [place], [place] [address] [phone]  
: Haresh Zimmer MD   
   
                      Potassium [Moles/Vol] 4.2 mmol/L Normal     3.7-5.3    [facility]  
   
                                        Comment on above:   Performed By: #### D  
AU ####  
[facility]  
[address] [place] [address] [address] [place], [place] [address] [phone]  
: Haresh Zimmer MD   
   
                      Protein [Mass/Vol] 7.4 g/dL   Normal     6.4-8.3    [facility]  
   
                                        Comment on above:   Performed By: #### D  
AU ####  
[facility]  
[address] [place] [address] [phone]  
: Haresh Zimmer MD   
   
                      Sodium [Moles/Vol] 136 mmol/L Normal     135-144    [facility]  
   
                                        Comment on above:   Performed By: #### D  
AU ####  
[address] [place] [place] [address] [phone]  
: Haresh Zimmer MD   
   
                      Staging:              Normal                [facility]  
   
                                        Comment on above:   Result Comment: Stag  
e 1: Some kidney damage normal GFR  
Stage 2: Mild kidney damage GFR 60-89  
Stage 3: Moderate kidney damage GFR 30-59  
Stage 4: Severe kidney damage GFR 15-29  
Stage 5: Severe kidney damage GFR <15  
ESRD - chronic treatment by dialysis or transplant   
   
                                                            Performed By: #### D  
AU ####  
[address] [place] [address] [phone]  
: Haresh Zimmer MD   
   
                                                    Urea nitrogen   
[Mass/Vol]      9 mg/dL         Normal          6-20            [facility]  
   
                                        Comment on above:   Performed By: #### D  
AU ####  
[address] [place] [address] [phone]  
: Haresh Zimmer MD   
   
                                                    Comprehensive Metabolic Pane  
lOrdered By: Rafael Gallagher on 2021   
   
                          Albumin [Mass/Vol] 4.5 g/dL                  3.5 - 5.2  
   
g/dL                                    [facility]  
Work   
Phone:   
[phone]  
   
                                                    Albumin/Globulin [Mass   
ratio]          1.6 {ratio}                                     [facility]  
[employer]   
Phone:   
[phone]  
   
                                                    ALP (Bld) [Catalytic   
activity/Vol]       72 U/L                                  35 - 104   
U/L                                     [facility]  
Work   
Phone:   
[phone]  
   
                                                    ALT [Catalytic   
activity/Vol]   71 U/L          High            5 - 33 U/L      [facility]   
Phone:   
[phone]  
   
                          Anion gap [Moles/Vol] 10 mmol/L                 9 - 17  
   
mmol/L                                  [facility]   
Phone:   
[phone]  
   
                                                    AST [Catalytic   
activity/Vol]   35 U/L          High            <32             [facility]   
Phone:   
[phone]  
   
                          Bilirubin [Mass/Vol] 0.34 mg/dL                0.3 - 1  
.2   
mg/dL                                   [facility]   
Phone:   
[phone]  
   
                          Calcium [Mass/Vol] 10.3 mg/dL                8.6 - 10.  
4   
mg/dL                                   [facility]   
Phone:   
[phone]  
   
                          Chloride [Moles/Vol] 103 mmol/L                98 - 10  
7   
mmol/L                                  [facility]   
Phone:   
[phone]  
   
                          CO2 [Moles/Vol] 23 mmol/L                 20 - 31   
mmol/L                                  [facility]   
Phone:   
[phone]  
   
                          Creatinine [Mass/Vol] 0.65 mg/dL                0.50 -  
 0.90   
mg/dL                                   [facility]   
Phone:   
[phone]  
   
                                                    Free PSA/Total PSA   
[Mass fraction]     7.4 g/dL                                6.4 - 8.3   
g/dL                                    [facility]   
Phone:   
[phone]  
   
                      GFR  >60                   >60 mL/min [facility]  
y   
Health  
Work   
Phone:   
[phone]  
   
                                                    GFR Non-   
American        >60                             >60 mL/min      [facility]   
Phone:   
[phone]  
   
                          Glucose [Mass/Vol] 84 mg/dL                  70 - 99   
mg/dL                                   [facility]   
Phone:   
[phone]  
   
                          Potassium [Moles/Vol] 4.2 mmol/L                3.7 -   
5.3   
mmol/L                                  [facility]   
Phone:   
[phone]  
   
                          Sodium [Moles/Vol] 136 mmol/L                135 - 144  
   
mmol/L                                  [facility]   
Phone:   
[phone]  
   
                                                    Urea nitrogen (BldV)   
[Mass/Vol]          9 mg/dL                                 6 - 20   
mg/dL                                   [facility]  
Work   
Phone:   
[phone]  
   
                                                    Urea   
nitrogen/Creatinine   
(Bld) [Mass ratio] 14                                              [facility]  
[employer]   
Phone:   
[phone]  
   
                                                    Drug Scr, Abuse, Uron 2021   
   
                      Amphetamine(s),Ur Negative   Normal     NEG        [facility]  
   
                                        Comment on above:   Performed By: #### C  
OVRB ####  
[facility]  
[address] [place] [address] [place] [address] [phone]  
: Haresh Zimmer MD   
   
                      Barbiturate(s),Ur Negative   Normal     [facility]  
   
                                        Comment on above:   Performed By: #### C  
OVRB ####  
[address] [place], [place] [address] [phone]  
: Haresh Zimmer MD   
   
                      Benzodiazepine(s) Negative   Normal     NEG        [facility]  
   
                                        Comment on above:   Performed By: #### C  
OVRB ####  
[address] [place], [place] [address] [phone]  
: Haresh Zimmer MD   
   
                      Buprenorphrine, Ur Negative   Normal     [facility]  
   
                                        Comment on above:   Performed By: #### C  
OVRB ####  
[address] [place], [place] [address] [phone]  
: Haresh Zimmer MD   
   
                      Cannabinoid(s),Ur Positive   Abnormal   NEG        [facility]  
   
                                        Comment on above:   Performed By: #### C  
OVRB ####  
[facility]  
[address] [place] [address] [place] [address] [phone]  
: Haresh Zimmer MD   
   
                      Cocaine Metabolite Negative   Normal     [facility]  
   
                                        Comment on above:   Performed By: #### C  
OVRB ####  
[address] [place] [address] [phone]  
: Haresh Zimmer MD   
   
                      Methadone Ql (U) Negative   Normal     [facility]  
   
                                        Comment on above:   Performed By: #### C  
OVRB ####  
[facility]  
[address] [place] [address] [place] [place], [place] [address] [phone]  
: Haresh Zimmer MD   
   
                      Methamphetamine, Ur Negative   Normal     NEG        [facility]  
   
                                        Comment on above:   Performed By: #### C  
OVRB ####  
[facility]  
[address] [place], [place] [address] [phone]  
: Haresh Zimmer MD   
   
                      Opiate(s), Ur Negative   Normal     NEG        [facility]  
   
                                        Comment on above:   Performed By: #### C  
OVRB ####  
[facility]  
[address] [place], [place] [address] [phone]  
: Haresh Zimmer MD   
   
                      Oxycodone, Urine Negative   Normal     NEG        [facility]  
   
                                        Comment on above:   Performed By: #### C  
OVRB ####  
[facility]  
[address] [place], [place] [address] [phone]  
: Haresh Zimmer MD   
   
                      Phencyclidine, Ur Negative   Normal     NEG        [facility]  
   
                                        Comment on above:   Performed By: #### C  
OVRB ####  
[facility]  
[address] [place], [place] [address] [phone]  
: Haresh Zimmer MD   
   
                      Propoxyphene,Urine Negative   Normal     [facility]  
   
                                        Comment on above:   Performed By: #### C  
OVRB ####  
[facility]  
[address] [place], [place] [address] [phone]  
: Haresh Zimmer MD   
   
                                                    Tricyclic   
antidepressants Screen   
Ql (U)          Negative        Normal          [facility]  
   
                                        Comment on above:   Result Comment: Drug  
 screen results are to be used for medical  
  
purposes only. All positive  
results are unconfirmed. Testing for employment or legal uses   
should be sent  
to a reference laboratory for confirmation.   
   
                                                            Performed By: #### C  
OVRB ####  
[facility]  
[address] [place] [address] [phone]  
: Haresh Zimmer MD   
   
                      Interpretive Info NOT REPORTED [facility]  
   
                                        Comment on above:   Performed By: #### C  
OVRB ####  
[facility]  
[address] [place] [address] [phone]  
: Haresh Zimmer MD   
   
                                                    Drug Scr, Abuse, UrOrdered B  
y: Rafael Gallagher on 2021   
   
                      MDMA, Urine NOT REPORTED Normal     NEG        [facility]   
Phone:   
[phone]  
   
                                        Comment on above:   Performed By: #### C  
OVRB ####  
[address] [place] [address] [phone]  
: Haresh Zimmer MD   
   
                                                    EthanolOrdered By: Rafael valdivia on 2021   
   
                      Ethanol [Mass/Vol] mg/dL                 <10 mg/dL  [facility]   
Phone:   
[phone]  
   
                      Ethanol percent <0.010                <0.010 %   [facility]   
Phone:   
[phone]  
   
                                                                  [facility]   
Phone:   
[phone]  
   
                                                    Ethanol Alcoholon 2021  
   
   
                      Ethanol [Mass/Vol] mg/dL      Normal     <10        [facility]  
   
                                        Comment on above:   Performed By: #### A  
LC[facility], ACET ####  
[address] [place] [address] [phone]  
: Haresh Zimmer MD   
   
                      Ethanol percent <0.010     Normal     <0.010     [facility]  
   
                                        Comment on above:   Performed By: #### A  
[facility], [facility] ####  
[address] [place] [address] [phone]  
: Haresh Zimmer MD   
   
                                                    HCG Qualitative, SerumOrdere  
d By: Rafael Charlesmarianne on 2021   
   
                      hCG Qual   Negative              NEGATIVE   [facility]   
Phone:   
[phone]  
   
                                        Comment on above:   Specimens with hCG l  
evels near the threshold of the test (25   
mIU/mL) may give a negative or  
indeterminate result. In such cases, another test should be   
performed with a new specimen  
in 48-72 hours. If early pregnancy is suspected clinically in   
this setting, correlation  
with quantitative serum b-hCG level is suggested.  
  
[employer] has confirmed the use of plasma for this   
test. This has not been cleared  
or approved by the U.S. Food and Drug Administration. The FDA   
has determined that such  
clearance is not necessary.  
  
   
   
                                                                  [facility]   
Phone:   
[phone]  
   
                                                    HCG Screen, Bloodon 20  
21   
   
                      HCG Screen, Blood Negative   Normal     NEG        [facility]  
   
                                        Comment on above:   Result Comment: Spec  
imens with hCG levels near the threshold   
of the test (25 mIU/mL) may give a  
negative or indeterminate result. In such cases, another test   
should be  
performed with a new specimen in 48-72 hours. If early   
pregnancy is suspected  
clinically in this setting, correlation with quantitative   
serum b-hCG level is  
suggested.  
[employer] has confirmed the use of plasma for this   
test. This has not  
been cleared or approved by the U.S. Food and Drug   
Administration. The FDA has  
determined that such clearance is not necessary.   
   
                                                            Performed By: #### D  
AU ####  
[facility]  
[address] [place] [address] [place] [address]  
[phone]  
: Haresh Zimmer MD   
   
                                                    Laboratory - Chemistry and C  
hemistry - challengeOrdered By: Rafael Gallagher on   
2021   
   
                                                    GFR/1.73 sq M.predicted   
MDRD (S/P/Bld) [Vol   
rate/Area]                                                      [employer]   
Phone:   
[phone]  
   
                                        Comment on above:   Average GFR for 20-2  
9 years old:  
116 mL/min/1.73sq m  
Chronic Kidney Disease:  
<60 mL/min/1.73sq m  
Kidney failure:  
<15 mL/min/1.73sq m  
  
  
eGFR calculated using average adult body mass. Additional eGFR   
calculator available at:  
  
http://www.[employer].for; to (do)/multiple_crcl_2012.htm  
  
  
   
   
                                                            Stage 1: Some kidney  
 damage normal GFR  
Stage 2: Mild kidney damage GFR 60-89  
Stage 3: Moderate kidney damage GFR 30-59  
Stage 4: Severe kidney damage GFR 15-29  
Stage 5: Severe kidney damage GFR <15  
ESRD - chronic treatment by dialysis or transplant  
  
  
   
   
                                                    No Panel InformationOrdered   
By: Rafael Gallagher on 2021   
   
                                                    Interpretation and   
review of laboratory   
results         Abnormal                                        [facility]   
Phone:   
[phone]  
   
                                                                  [facility]   
Phone:   
[phone]  
[phone]  
   
                                                    SARS-CoV-2on 2021   
   
                                                    SARS-CoV-2 (COVID-19)   
RNA PAMELA+probe Ql (Unsp   
spec)           Not detected    Normal          NOTDET          [facility]  
   
                                        Comment on above:   Result Comment:  
Rapid NAAT: The specimen is NEGATIVE for SARS-CoV-2, the novel   
coronavirus  
associated with COVID-19.  
The ID NOW COVID-19 assay is designed to detect the virus that   
causes COVID-19  
in patients with signs and symptoms of infection who are   
suspected of COVID-19.  
An individual without symptoms of COVID-19 and who is not   
shedding SARS-CoV-2  
virus would expect to have a negative (not detected) result in   
this assay.  
Negative results should be treated as presumptive and, if   
inconsistent with  
clinical signs and symptoms or necessary for patient   
management,  
should be tested with an alternative molecular assay. Negative   
results do not  
preclude SARS-CoV-2 infection and  
should not be used as the sole basis for patient management   
decisions.  
Fact sheet for Healthcare Providers:   
https://www.fda.gov/media/335107/download  
Fact sheet for Patients:   
https://www.fda.gov/media/779892/download  
Methodology: Isothermal Nucleic Acid Amplification   
   
                                                            Performed By: #### C  
OVRB ####  
[facility]  
[address] [place] [address] [phone]  
: Haresh Zimmer MD   
   
                                                    SPECIMEN REJECTIONOrdered By  
: Rafael Gallagher on 2021   
   
                      -          NOT REPORTED                       [facility]   
Phone:   
[phone]  
   
                      Ordered Test CDP                              [facility]   
Phone:   
[phone]  
   
                                        Reason for Rejection Unable to perform   
testing: No specimen   
received.                                                   [employer]   
Phone:   
[phone]  
   
                                                    Specimen source Nom   
(Unsp spec)     .BLOOD                                          [facility]   
Phone:   
[phone]  
   
                                                                  [facility]   
Phone:   
[phone]  
   
                                                    Salicylateon 2021   
   
                      Salicylate <1         Low        3-10       [facility]  
   
                                        Comment on above:   Performed By: #### D  
AU ####  
[facility]  
[address] [place] [address] [phone]  
: Haresh Zimmer MD   
   
                                                    SalicylateOrdered By: Rafael Gallagher on 2021   
   
                          Salicylate Lvl <1           Low          3 - 10   
mg/dL                                   [facility]  
Work   
Phone:   
[phone]  
   
                                                    Specimen Rejectionon   
021   
   
                                        Reason for rejection Unable to perform   
testing: No specimen   
received.           Normal                                  [facility]  
   
                                        Comment on above:   Performed By: #### C  
OVRB ####  
[facility]  
[address] [place], [place] [address] [phone]  
: Haresh Zimmer MD   
   
                      Source of sample .BLOOD     Normal                [facility]  
   
                                        Comment on above:   Performed By: #### C  
OVRB ####  
[facility]  
[address] [place], [place] [address] [phone]  
: Haresh Zimmer MD   
   
                      Test ordered CDP        Normal                [facility]  
   
                                        Comment on above:   Performed By: #### C  
OVRB ####  
[facility]  
[address] [place], [place] [address] [phone]  
: Haresh Zimmer MD   
   
                      -----      NOT REPORTED Normal                [facility]  
   
                                        Comment on above:   Performed By: #### C  
OVRB ####  
[facility]  
[address] [place], [place] [address] [phone]  
: Haresh Zimmer MD   
   
                                                    Urinalysis w/ Microon 2021   
   
                      -----                 Normal                [facility]  
   
                                        Comment on above:   Performed By: #### C  
OVRB ####  
[facility]  
[address] [place], [place] [address] [phone]  
: Haresh Zimmer MD   
   
                      Bacteria   1+         Abnormal   NONE       [facility]  
   
                                        Comment on above:   Performed By: #### C  
OVRB ####  
[facility]  
[address] [place] [address] [phone]  
: Haresh Zimmer MD   
   
                      Bilirubin, SemiQt,Ur Negative   Normal     NEG        [facility]  
   
                                        Comment on above:   Performed By: #### C  
OVRB ####  
[facility]  
[address] [place], [place] [address] [phone]  
: Haresh Zimmer MD   
   
                      Blood, Urine Negative   Normal     NEG        [facility]  
   
                                        Comment on above:   Performed By: #### C  
OVRB ####  
[facility]  
[address] [place], [place] [address] [phone]  
: Haresh Zimmer MD   
   
                      Clarity (U) CLEAR      Normal     CLEAR      [facility]  
   
                                        Comment on above:   Performed By: #### C  
OVRB ####  
[facility]  
[address] [place], [place] [address] [phone]  
: Haresh Zimmer MD   
   
                      Color (U)  YELLOW     Normal     YEL        [facility]  
   
                                        Comment on above:   Performed By: #### C  
OVRB ####  
[facility]  
[address] [place], [place] [address] [phone]  
: Haresh Zimmer MD   
   
                                                    Epithelial cells LM Ql   
(Urine sed)     5 TO 10         Normal          0-25            [facility]  
   
                                        Comment on above:   Performed By: #### C  
OVRB ####  
[facility]  
[address] [place], [place] [address] [phone]  
: Haresh Zimmer MD   
   
                      Glucose Ql (U) Negative   Normal     NEG        [facility]  
   
                                        Comment on above:   Performed By: #### C  
OVRB ####  
[facility]  
[address] [place], [place] [address] [phone]  
: Haresh Zimmer MD   
   
                      Ketones Ql (U) Negative   Normal     NEG        [facility]  
   
                                        Comment on above:   Performed By: #### C  
OVRB ####  
[facility]  
[address] [place], [place] [address] [phone]  
: Haresh Zimmer MD   
   
                                                    Leukocyte esterase Test   
strip Ql (U)    Negative        Normal          NEG             [facility]  
   
                                        Comment on above:   Performed By: #### C  
OVRB ####  
[facility]  
[address] [place] [address] [phone]  
: Haresh Zimmer MD   
   
                      Nitrite,Ur Negative   Normal     NEG        [facility]  
   
                                        Comment on above:   Performed By: #### C  
OVRB ####  
[facility]  
[address] [place], [place] [address] [phone]  
: Haresh Zimmer MD   
   
                      PH,Ur      5.5        Normal     5.0-9.0    [facility]  
   
                                        Comment on above:   Performed By: #### C  
OVRB ####  
[facility]  
[address] [place], [place] [address] [phone]  
: Haresh Zimmer MD   
   
                      Protein Ql (U) Negative   Normal     NEG        [facility]  
   
                                        Comment on above:   Performed By: #### C  
OVRB ####  
[facility]  
[address] [place], [place] [address] [phone]  
: Haresh Zimmer MD   
   
                      Spec. Gravity,Ur 1.025      High       1.010-1.020 [facility]  
   
                                        Comment on above:   Performed By: #### C  
OVRB ####  
[facility]  
[address] [place], [place] [address] [phone]  
: Haresh Zimmer MD   
   
                      Urine RBC's None       Normal     0-2        [facility]  
   
                                        Comment on above:   Performed By: #### C  
OVRB ####  
[facility]  
[address] [place], [place] [address] [phone]  
: Haresh Zimmer MD   
   
                      Urine WBC's 0 TO 2     Normal     0-5        [facility]  
   
                                        Comment on above:   Performed By: #### C  
OVRB ####  
[facility]  
[address] [place], [place] [address] [phone]  
: Haresh Zimmer MD   
   
                      Urobilinogen,Ur Normal     Normal     NORM       [facility]  
   
                                        Comment on above:   Performed By: #### C  
OVRB ####  
[facility]  
[address] [place], [place] [address] [phone]  
: Haresh Zimmer MD   
   
                                                    Amorphous sediment LM   
Ql (Urine sed)  NOT REPORTED    Normal          NONE            [facility]  
   
                                        Comment on above:   Performed By: #### C  
OVRB ####  
[facility]  
[address] [place], [place] [address] [phone]  
: Haresh Zimmer MD   
   
                      Casts      NOT REPORTED Normal                [facility]  
   
                                        Comment on above:   Performed By: #### C  
OVRB ####  
[facility]  
[address] [place], [place] [address] [phone]  
: Haresh Zimmer MD   
   
                      Comment    NOT REPORTED Normal                [facility]  
   
                                        Comment on above:   Performed By: #### C  
OVRB ####  
[facility]  
[address] [place], [place] [address] [phone]  
: Haresh Zimmer MD   
   
                                                    Crystals LM Nom (Urine   
sed)            NOT REPORTED    Normal          [facility]  
   
                                        Comment on above:   Performed By: #### C  
OVRB ####  
[facility]  
[address] [place], [place] [address] [phone]  
: Haresh Zimmer MD   
   
                      Epithelial, Renal NOT REPORTED Normal     0          [facility]  
   
                                        Comment on above:   Performed By: #### C  
OVRB ####  
[facility]  
[address] [place] [address]  
[phone]  
: Haresh Zimmer MD   
   
                      Mucus Strands NOT REPORTED Normal     [facility]  
   
                                        Comment on above:   Performed By: #### C  
OVRB ####  
[facility]  
[address] [place], [place] [address] [phone]  
: Haresh Zimmer MD   
   
                      Other Observations NOT REPORTED Normal     NREQ       [facility]  
   
                                        Comment on above:   Performed By: #### C  
OVRB ####  
[facility]  
[address] [place], [place] [address] [phone]  
: Haresh Zimmer MD   
   
                      Trichomonas NOT REPORTED Normal     [facility]  
   
                                        Comment on above:   Performed By: #### C  
OVRB ####  
[facility]  
[address] [place] [address] [phone]  
: Haresh Zimmer MD   
   
                      Yeast      NOT REPORTED Normal     NONE       [facility]  
   
                                        Comment on above:   Performed By: #### C  
OVRB ####  
[facility]  
[address] [place] [address] [phone]  
: Haresh Zimmer MD   
   
                                                    Urinalysis with microscopicO  
rdered By: Seth Rahman on 2021   
   
                      -                                           [employer]  
Work   
Phone:   
[phone]  
   
                      Amorphous, UA NOT REPORTED            None       [employer]  
Work   
Phone:   
[phone]  
   
                      Bacteria, UA 1+         Abnormal   None       [employer]   
Phone:   
[phone]  
   
                      Bilirubin Urine Negative              NEGATIVE   [employer]   
Phone:   
[phone]  
   
                      Casts UA   NOT REPORTED            /LPF       [employer]  
Work   
Phone:   
[phone]  
   
                      Color, UA  YELLOW                YELLOW     [employer]  
Work   
Phone:   
[phone]  
   
                      Crystals, UA NOT REPORTED            None /HPF  [employer]  
Work   
Phone:   
[phone]  
   
                      Epithelial Cells UA 5 TO 10                          [employer]  
Work   
Phone:   
[phone]  
   
                      Glucose, Ur Negative              NEGATIVE   [employer]  
Work   
Phone:   
[phone]  
   
                                                    Interpretation and   
review of laboratory   
results         Abnormal                                        [employer]  
Work   
Phone:   
[phone]  
   
                      Ketones Ql (U) Negative              NEGATIVE   [facility]   
Phone:   
[phone]  
   
                                                    Leukocyte esterase Test   
strip Ql (U)    Negative                        NEGATIVE        [employer]   
Phone:   
[phone]  
   
                      Mucus, UA  NOT REPORTED            None       [employer]  
Work   
Phone:   
[phone]  
   
                      Nitrite, Urine Negative              NEGATIVE   [facility]   
Phone:   
[phone]  
   
                      Other Observations UA NOT REPORTED            NOT REQ.   [facility]  
[facility]  
Work   
Phone:   
[phone]  
   
                      pH, UA     5.5                              [facility]  
Work   
Phone:   
[phone]  
   
                      Protein, UA Negative              NEGATIVE   [employer]   
Phone:   
[phone]  
   
                      RBC, UA    None                             [employer]  
Work   
Phone:   
[phone]  
   
                      Renal Epithelial, UA NOT REPORTED            0 /HPF     [facility]  
[employer]  
Work   
Phone:   
[phone]  
   
                      Specific [place], UA 1.025      High                  [facility]  
y   
Health  
Work   
Phone:   
[phone]  
   
                      Trichomonas, UA NOT REPORTED            None       [facility]  
Work   
Phone:   
[phone]  
   
                      Turbidity UA CLEAR                 CLEAR      [facility]  
Work   
Phone:   
[phone]  
   
                      Urinalysis Comments NOT REPORTED                       [facility]   
Health  
Work   
Phone:   
[phone]  
   
                      Urine Hgb  Negative              NEGATIVE   [facility]  
Work   
Phone:   
[phone]  
   
                      Urobilinogen, Urine Normal                Normal     [facility]  
   
Health  
Work   
Phone:   
[phone]  
   
                      WBC, UA    0 TO 2                           [facility]   
[facility]  
Work   
Phone:   
[phone]  
   
                      Yeast, UA  NOT REPORTED            None       [facility]   
Health  
Work   
Phone:   
[phone]  
   
                                                                  [facility]   
Health  
Work   
Phone:   
[phone]  
   
                                                    Urine Drug ScreenOrdered By:  
 Rafael Gallagher on 2021   
   
                      Amphetamine Screen, Ur Negative              NEGATIVE   [facility]  
[facility]   
Health  
Work   
Phone:   
[phone]  
   
                      Barbiturate Screen, Ur Negative              NEGATIVE   [facility]  
y   
Health  
Work   
Phone:   
[phone]  
   
                                                    Benzodiazepine Screen,   
Urine           Negative                        NEGATIVE        [facility]   
[facility]  
Work   
Phone:   
[phone]  
   
                      Buprenorphine Urine Negative              NEGATIVE   [facility]  
   
Health  
Work   
Phone:   
[phone]  
   
                      Cannabinoid Scrn, Ur Positive   Abnormal   NEGATIVE   [facility]  
[facility]   
Health  
Work   
Phone:   
[phone]  
   
                                                    Cocaine Metabolite,   
Urine           Negative                        NEGATIVE        [facility]   
[facility]  
Work   
Phone:   
[phone]  
   
                                                    Interpretation and   
review of laboratory   
results         Abnormal                                        [facility]   
[facility]  
Work   
Phone:   
[phone]  
   
                      Methadone Screen, Urine Negative              NEGATIVE   [facility]  
[facility]   
[facility]  
Work   
Phone:   
[phone]  
   
                      Methamphetamine, Urine Negative              NEGATIVE   [facility]  
[facility]   
Health  
Work   
Phone:   
[phone]  
   
                      Opiates, Urine Negative              NEGATIVE   [facility]   
[facility]  
Work   
Phone:   
[phone]  
   
                      Oxycodone Screen, Ur Negative              NEGATIVE   [facility]  
[facility]   
Health  
Work   
Phone:   
[phone]  
   
                      Phencyclidine, Urine Negative              NEGATIVE   [facility]  
[facility]   
Health  
Work   
Phone:   
[phone]  
   
                      Propoxyphene, Urine Negative              NEGATIVE   [facility]  
   
Health  
Work   
Phone:   
[phone]  
   
                      Test Information NOT REPORTED                       [employer]   
Phone:   
[phone]  
   
                                                    Tricyclic   
Antidepressants, Urine Negative                        NEGATIVE        [employer]   
Phone:   
[phone]  
   
                                        Comment on above:   Drug screen results   
are to be used for medical purposes only.   
All positive results are  
unconfirmed. Testing for employment or legal uses should be   
sent to a reference laboratory  
for confirmation.  
  
   
   
                                                                  [facility]   
Phone:   
[phone]  
   
                                                    COVID-19 PCRon 2020   
   
                          SARS-CoV-2, PAMELA Not Detected Normal       Not   
Detected                                The   
[facility]  
   
                                        Comment on above:   Result Comment: This  
 test was developed and its performance   
characteristics determined  
by [employer]. This test has not been FDA cleared or  
approved. This test has been authorized by FDA under an   
Emergency Use  
Authorization (EUA). This test is only authorized for the   
duration of  
time the declaration that circumstances exist justifying the  
authorization of the emergency use of in vitro diagnostic   
tests for  
detection of SARS-CoV-2 virus and/or diagnosis of COVID-19   
infection  
under section 564(b)(1) of the Act, 21 U.S.C. 360bbb-3(b)(1),   
unless  
the authorization is terminated or revoked sooner.  
When diagnostic testing is negative, the possibility of a   
false  
negative result should be considered in the context of a   
patient's  
recent exposures and the presence of clinical signs and   
symptoms  
consistent with COVID-19. An individual without symptoms of   
COVID-19  
and who is not shedding SARS-CoV-2 virus would expect to have   
a  
negative (not detected) result in this assay.   
   
                                                            Performed By: #### C  
VDPCR ####  
[facility]  
[address] [place] [id number]  
[facility]   
   
                                                    CBC/PLATELET & AUTO DIFFEREN  
TIALon 2017   
   
                                                    Basophils Auto #/vol   
(Bld)           0.0 10*3/uL     Normal          0.0-0.1         [facility]  
   
                                                    Basophils/100 WBC Auto   
(Bld)           0 %             Normal          0-1             [facility]  
   
                                                    CBC/PLATELET & AUTO   
DIFFERENTIAL    0.0 10*3/uL     Normal          -               [facility]  
   
                                        Comment on above:   Result Comment: er 7  
   
   
                      Eosinophils 0.0 10*3/uL Normal     0.0-0.4    [facility]  
   
                                                    Eosinophils/100   
leukocytes      0 %             Normal          0-5             [facility]  
   
                                                    Erythrocyte   
distribution width Auto   
Ratio (RBC)     12.9 %          Normal          11.7-14.4       [facility]  
   
                      Erythrocytes (RBC) 4.79 10*6/uL Normal     4.20-5.40  [facility]  
   
                      Hematocrit (HCT) 40.0 %     Normal     37.0-47.0  [facility]  
   
                                                    Hemoglobin mass conc   
(Bld)           14.0 g/dL       Normal          11.5-16.0       [facility]  
   
                      Lymphocytes 1.8 10*3/uL Normal     0.9-2.5    [facility]  
   
                                                    Lymphocytes/100   
leukocytes      10 %            Low             13-44           [facility]  
   
                      MCH        29.2 pg    Normal     27.0-31.0  [facility]  
   
                      MCHC mass conc (RBC) 35.0 g/dL  Normal     31.5-36.0  [facility]  
   
                      MCV        83.5 fL    Normal     82.0-101.0 [facility]  
   
                      Monocytes  1.0 10*3/uL Normal     0.1-1.0    [facility]  
   
                                                    Monocytes/100   
leukocytes      6 %             Normal          5-9             [facility]  
   
                      Neutrophils 14.5 10*3/uL High       2.1-6.5    [facility]  
   
                                                    Neutrophils/100   
leukocytes      83 %            High            39-75           [facility]  
   
                      Nucleated erythrocytes 0 %        Normal     -          [facility]  
   
                                                    Platelet mean volume   
(PMV)           8.5 fL          Normal          8.2-11.4        [facility]  
   
                      Platelets  239.0 10*3/uL Normal     130.0-400.0 [facility]  
   
                      WBC (Leukocytes) 17.6 10*3/uL High       4.4-10.2   [facility]  
   
                                                    CHEST PORTABLEon 2017   
   
                                        CHEST PORTABLE      EXAM: Portable   
chest.CLINICAL STATEMENT:   
Syncopal episode   
today.COMPARISON:   
2017.TECHNIQUE:   
Single mobile AP upright   
view chest at 3:21   
PM.FINDINGS: Heart size   
within normal limits.   
There is no mediastinal   
widening.The lung fields   
and pleural spaces are   
clear.IMPRESSION:No acute   
change.Dictated   
Physician: Je BellDictated On: 2017   
15:30Interpreted By:   
Je BellTranscribed By: Gunnar Belligned By: Je BellSigned On:   
2017 15:30    Normal                                  [facility]  
   
                                                    HCG URINEon 2017   
   
                                                    HCG.beta subunit   
(pregnancy test) Ql (U) Negative        Normal          Negative        [facility]  
   
                                                    HCG.beta subunit   
(pregnancy test) Ql (U) SEE NOTE        Normal          -               [facility]  
   
                                        Comment on above:   Result Comment: er 7  
   
   
                                                    M I PANELon 2017   
   
                      Anion gap  12.8 mmol/L Normal     -          [facility]  
   
                      BUN/Creatinine Ratio 10.2 mg/mg Normal     [facility]  
   
                      Calcium    8.8 mg/dL  Normal     8.5-10.1   [facility]  
   
                      Chloride   107 mmol/L Normal          [facility]  
   
                      CO2        27.1 mmol/L Normal     21.0-32.0  [facility]  
   
                      Creatinine 0.98 mg/dL Normal     0.55-1.02  [facility]  
   
                      eGFR (non-black) mL/min/{1.73_m2} Normal     >60        [facility]  
   
                      Glucose mass conc 87 mg/dL   Normal          [facility]  
   
                      M I PANEL  SEE NOTE   Normal     -          [facility]  
   
                                        Comment on above:   Result Comment: er 7  
   
   
                      Magnesium  1.9 mg/dL  Normal     1.8-2.4    [facility]  
   
                      Osmolality 283 mosm/kg [facility]  
   
                      Potassium molar conc 3.9 mmol/L Normal     3.5-5.1    [facility]  
   
                      Sodium     143 mmol/L Normal     136-145    [facility]  
   
                                                    Troponin I.cardiac mass   
conc            ng/mL           Normal          0.000-0.056     [facility]  
   
                      Urea nitrogen 10 mg/dL   Normal     7-18       [facility]  
   
                                                    PT PROTIMEon 2017   
   
                      INR Coag RelTime (PPP) 1.0 {INR}  Normal     -          [facility]  
   
                                                    Prothrombin time (PT)   
Coag time (PPP) 10.5 s          Normal          9.3-11.7        [facility]  
   
                      PT PROTIME SEE NOTE   Normal     [facility]  
   
                                        Comment on above:   Result Comment: er 7  
   
   
                                                    PTTon 2017   
   
                      aPTT       24.8 s     Normal     24.5-32.7  [facility]  
   
                                        Comment on above:   Result Comment: er 7  
   
   
                                                    URINALYSIS W/ MICROSCOPIC if  
 indicatedon 2017   
   
                      Bilirubin Ql (U) Negative   Normal     Negative   [facility]  
   
                      Blood      Moderate   Abnormal   Negative   [facility]  
   
                      Blood product type Clean catch Normal     -          [facility]  
   
                      Glucose mass conc Normal     Normal     Normal     [facility]  
   
                      Protein    Moderate   Abnormal   Negative   [facility]  
   
                      Sq. Epithelial Cells 3 /[HPF]   Abnormal   None seen  [facility]  
   
                                                    URINALYSIS W/   
MICROSCOPIC if   
indicated       0 /[HPF]        Normal          0-2             [facility]  
   
                                        Comment on above:   Result Comment: er 7  
   
   
                      Urine, appearance CLEAR      Normal     Clear      [facility]  
   
                                                    Urine, bacteria in   
sediment        1 /[HPF]        Abnormal        None seen       [facility]  
   
                      Urine, color YELLOW     Normal     -          [facility]  
   
                      Urine, ketones presence Negative   Normal     Negative   H  
[facility]  
   
                      Urine, nitrite presence Positive   Abnormal   Negative   H  
[facility]  
   
                      Urine, pH  7.0 [pH]   Normal     5.0 - 9.0  [facility]  
   
                          Urine, specific gravity 1.010        Normal       1.01  
0 -   
1.030                                   [facility]  
   
                      Urine, urobilinogen Normal     Normal     Normal     [facility]  
   
                      WBC (Leukocytes) 6 /[HPF]   Abnormal   0-5        [facility]  
   
                      WBC (Leukocytes) Moderate   Abnormal   Negative   [facility]  
  
  
  
Vital Signs  
  
  
                          Date Time    Vital Sign   Value        Performing   
Clinician                               Facility  
   
                                                    2024   
16:          Body height         162.56 cm           Doreen Deborah LARISSA  
Work Phone:   
[phone]                          [facility]  
Work Phone:   
[phone]  
   
                                                    2024   
16:                              Body mass index   
(BMI) [Ratio]             57.2 kg/m2                Doreen Deborah DIAS  
Work Phone:   
[phone]                          [facility]  
Work Phone:   
[phone]  
   
                                                    2024   
16:                              Body surface area   
Derived from formula      2.4 m2                    Doreen Deborah DIAS  
Work Phone:   
[phone]                          [facility]  
Work Phone:   
[phone]  
   
                                                    2024   
16:          Body temperature    97.6 [degF]         Doreen Deborah DIAS  
Work Phone:   
[phone]                          [facility]  
Work Phone:   
[phone]  
   
                                                    2024   
16:          Body weight         151.05 kg           Doreen Cabrera CNP  
Work Phone:   
[phone]                          [facility]  
Work Phone:   
[phone]  
   
                                                    2024   
16:                              Diastolic blood   
pressure                  86 mm[Hg]                 Doreen Mccormacken CNP  
Work Phone:   
[phone]                          [facility]  
Work Phone:   
[phone]  
   
                                                    2024   
16:          Heart rate          103 /min            Doreen Cabrera CNP  
Work Phone:   
[phone]                          [facility]  
Work Phone:   
[phone]  
   
                                                    2024   
16:          Respiratory rate    18 /min             Doreen Cabrera CNP  
Work Phone:   
[phone]                          [facility]  
Work Phone:   
[phone]  
   
                                                    2024   
16:                              SaO2% (BldA) [Mass   
fraction]                 95 %                      Doreen Cabrera CNP  
Work Phone:   
[phone]                          [facility]  
Work Phone:   
[phone]  
   
                                                    2024   
16:                              Systolic blood   
pressure                  135 mm[Hg]                Doreen Deborah CNP  
Work Phone:   
[phone]                          [facility]  
Work Phone:   
[phone]  
   
                                                    2024   
17:                              Diastolic blood   
pressure                  84 mm[Hg]                 Doreen Deborah CNP  
Work Phone:   
[phone]                          [facility]  
   
                                        Comment on above:   manual large   
   
                                                    2024   
17:                              Systolic blood   
pressure                  144 mm[Hg]                Doreen Deborah CNP  
Work Phone:   
[phone]                          [facility]  
   
                                        Comment on above:   manual large   
   
                                                    2024   
17:                              Diastolic blood   
pressure                  86 mm[Hg]                 Doreen Deborah CNP  
Work Phone:   
[phone]                          [facility]  
Work Phone:   
[phone]  
   
                                                    2024   
17:                              Systolic blood   
pressure                  154 mm[Hg]                Doreen Deborah CNP  
Work Phone:   
[phone]                          [facility]  
Work Phone:   
[phone]  
   
                                                    2024   
16:          Body height         162.56 cm           Doreen Cabrera CNP  
Work Phone:   
[phone]                          [facility]  
Work Phone:   
[phone]  
   
                                                    2024   
16:                              Body mass index   
(BMI) [Ratio]             57.9 kg/m2                Doreen Cabrera CNP  
Work Phone:   
[phone]                          [facility]  
Work Phone:   
[phone]  
   
                                                    2024   
16:                              Body surface area   
Derived from formula      2.4 m2                    Doreen Cabrera CNP  
Work Phone:   
[phone]                          [facility]  
Work Phone:   
[phone]  
   
                                                    2024   
16:          Body weight         153.14 kg           Doreen Cabrera CNP  
Work Phone:   
[phone]                          [facility]  
Work Phone:   
[phone]  
   
                                                    2024   
16:                              Diastolic blood   
pressure                  98 mm[Hg]                 Doreen Cabrera CNP  
Work Phone:   
[phone]                          [facility]  
Work Phone:   
[phone]  
   
                                                    2024   
16:          Heart rate          103 /min            Doreen Cabrera CNP  
Work Phone:   
[phone]                          [facility]  
Work Phone:   
[phone]  
   
                                                    2024   
16:                              SaO2% (BldA) [Mass   
fraction]                 96 %                      Doreen Cabrera CNP  
Work Phone:   
[phone]                          [facility]  
Work Phone:   
[phone]  
   
                                                    2024   
16:                              Systolic blood   
pressure                  151 mm[Hg]                Doreen Cabrera CNP  
Work Phone:   
[phone]                          [facility]  
Work Phone:   
[phone]  
   
                                                    2024   
16:                              Diastolic blood   
pressure                  89 mm[Hg]                 Doreen Cabrera CNP  
Work Phone:   
[phone]                          [facility]  
   
                                                    2024   
16:          Heart rate          75 /min             Doreen aCbrera CNP  
Work Phone:   
[phone]                          [facility]   
[facility]  
   
                                                    2024   
16:                              Systolic blood   
pressure                  137 mm[Hg]                Doreen Cabrera CNP  
Work Phone:   
[phone]                          [facility]   
[facility] [place]  
   
                                                    2024   
16:          Body height         162.56 cm           Doreen Cabrera CNP  
Work Phone:   
[phone]                          [facility]  
   
                                                    2024   
16:                              Body mass index   
(BMI) [Ratio]             54.6 kg/m2                Doreen Cabrera CNP  
Work Phone:   
[phone]                          [facility]  
   
                                                    2024   
16:                              Body surface area   
Derived from formula      2.4 m2                    Doreen Cabrera CNP  
Work Phone:   
[phone]                          [facility]  
   
                                                    2024   
16:          Body weight         144.24 kg           Doreen Cabrera CNP  
Work Phone:   
[phone]                          [facility]  
   
                                                    2024   
16:                              Diastolic blood   
pressure                  94 mm[Hg]                 Doreen Cabrera CNP  
Work Phone:   
[phone]                          [facility]  
   
                                                    2024   
16:          Heart rate          75 /min             Doreen Cabrera CNP  
Work Phone:   
[phone]                          [facility]  
   
                                                    2024   
16:                              SaO2% (BldA) [Mass   
fraction]                 98 %                      Doreen Cabrera CNP  
Work Phone:   
[phone]                          [facility]  
   
                                                    2024   
16:                              Systolic blood   
pressure                  161 mm[Hg]                Doreen Cabrera CNP  
Work Phone:   
[phone]                          [facility]  
   
                                                    2024   
19:          Body height         162.56 cm           Doreen Cabrera CNP  
Work Phone:   
[phone]                          [facility]  
   
                                                    2024   
19:                              Body mass index   
(BMI) [Ratio]             52.7 kg/m2                Doreenpaola Mccormacken CNP  
Work Phone:   
[phone]                          [facility]  
   
                                                    2024   
19:                              Body surface area   
Derived from formula      2.3 m2                    Doreen Cabrera CNP  
Work Phone:   
[phone]                          [facility]  
   
                                                    2024   
19:          Body weight         139.26 kg           Doreen Cabrera CNP  
Work Phone:   
[phone]                          [facility]  
   
                                                    2024   
19:                              Diastolic blood   
pressure                  88 mm[Hg]                 Doreen Cabrera CNP  
Work Phone:   
[phone]                          [facility]  
   
                                                    2024   
19:          Heart rate          106 /min            Doreen Cabrera CNP  
Work Phone:   
[phone]                          [facility]  
   
                                                    2024   
19:                              SaO2% (BldA) [Mass   
fraction]                 97 %                      Doreen Cabrera CNP  
Work Phone:   
[phone]                          [facility]  
   
                                                    2024   
19:                              Systolic blood   
pressure                  134 mm[Hg]                Doreen Cabrera CNP  
Work Phone:   
[phone]                          [facility]  
   
                                                    2023   
15:                              Diastolic blood   
pressure                  69 mm[Hg]                 Doreen Cabrera CNP  
Work Phone:   
[phone]                          [facility]  
   
                                                    2023   
15:                              Systolic blood   
pressure                  116 mm[Hg]                Doreen Cabrera CNP  
Work Phone:   
[phone]                          [facility]  
   
                                                    2023   
15:                              Diastolic blood   
pressure                  78 mm[Hg]                 Doreen Cabrera CNP  
Work Phone:   
[phone]                          [facility]  
Work Phone:   
[phone]  
   
                                                    2023   
15:                              Systolic blood   
pressure                  137 mm[Hg]                Doreen Cabrera CNP  
Work Phone:   
[phone]                          [facility]  
Work Phone:   
[phone]  
   
                                                    2023   
17:          Body height         162.56 cm           Doreen Cabrera CNP  
Work Phone:   
[phone]                          [facility]  
Work Phone:   
[phone]  
   
                                                    07-   
17:                              Body mass index   
(BMI) [Ratio]             32.8 kg/m2                Doreen Cabrera CNP  
Work Phone:   
[phone]                          [facility]  
Work Phone:   
[phone]  
   
                                                    2023   
17:                              Body surface area   
Derived from formula      1.9 m2                    Doreen Cabrera CNP  
Work Phone:   
[phone]                          [facility]  
Work Phone:   
[phone]  
   
                                                    2023   
17:          Body temperature    98.2 [degF]         Doreen Cabrera CNP  
Work Phone:   
[phone]                          [facility]  
Work Phone:   
[phone]  
   
                                                    2023   
17:          Body weight         86.64 kg            Doreen Cabrera CNP  
Work Phone:   
[phone]                          [facility]  
Work Phone:   
[phone]  
   
                                                    2023   
17:                              Diastolic blood   
pressure                  76 mm[Hg]                 Doreen Cabrera CNP  
Work Phone:   
[phone]                          [facility]  
Work Phone:   
[phone]  
   
                                                    2023   
17:          Heart rate          97 /min             Doreen Cbarera CNP  
Work Phone:   
[phone]                          [facility]  
Work Phone:   
[phone]  
   
                                                    2023   
17:                              SaO2% (BldA) [Mass   
fraction]                 97 %                      Doreen Cabrera CNP  
Work Phone:   
[phone]                          [facility]  
Work Phone:   
[phone]  
   
                                                    2023   
17:                              Systolic blood   
pressure                  111 mm[Hg]                Doreen Cabrera CNP  
Work Phone:   
[phone]                          [facility]  
Work Phone:   
[phone]  
   
                                                    07-   
14:                              Diastolic blood   
pressure                  76 mm[Hg]                 Doreen Cabrera CNP  
Work Phone:   
[phone]                          [facility]  
   
                                                    07-   
14:          Heart rate          94 /min             Doreen Cabrera CNP  
Work Phone:   
[phone]                          [facility]  
   
                                                    07-   
14:                              Systolic blood   
pressure                  124 mm[Hg]                Doreen Cabrera CNP  
Work Phone:   
[phone]                          [facility]  
   
                                                    10-   
13:          Body height         162.56 cm           Doreen Cabrera CNP  
Work Phone:   
[phone]                          [facility]  
Work Phone:   
[phone]  
   
                                                    10-   
13:                              Body mass index   
(BMI) [Ratio]             52.5 kg/m2                Doreen Cabrera CNP  
Work Phone:   
[phone]                          [facility]  
Work Phone:   
[phone]  
   
                                                    10-   
13:                              Body surface area   
Derived from formula      2.3 m2                    Doreen Cabrera CNP  
Work Phone:   
[phone]                          [facility]  
Work Phone:   
[phone]  
   
                                                    10-   
13:          Body temperature    99.3 [degF]         Doreen Cabrera CNP  
Work Phone:   
[phone]                          [facility]  
Work Phone:   
[phone]  
   
                                                    10-   
13:          Body weight         138.8 kg            Doreen Cabrera CNP  
Work Phone:   
[phone]                          [facility]  
Work Phone:   
[phone]  
   
                                                    10-   
13:                              Diastolic blood   
pressure                  86 mm[Hg]                 Doreen Cabrera CNP  
Work Phone:   
[phone]                          [facility]  
Work Phone:   
[phone]  
   
                                                    10-   
13:          Heart rate          96 /min             Doreen Cabrera CNP  
Work Phone:   
[phone]                          [facility]  
Work Phone:   
[phone]  
   
                                                    10-   
13:          Heart Rate Rhythm   1 1                 Doreen Cabrera CNP  
Work Phone:   
[phone]                          [facility]  
Work Phone:   
[phone]  
   
                                                    10-   
13:                              SaO2% (BldA) [Mass   
fraction]                 97 %                      Doreen Cabrera CNP  
Work Phone:   
[phone]                          [facility]  
Work Phone:   
[phone]  
   
                                                    10-   
13:                              Systolic blood   
pressure                  124 mm[Hg]                Doreen Cabrera CNP  
Work Phone:   
[phone]                          [facility]  
Work Phone:   
[phone]  
   
                                                    2022   
15:          Body height         162.56 cm           Doreen Cabrera CNP  
Work Phone:   
[phone]                          [facility]  
Work Phone:   
[phone]  
   
                                                    2022   
15:                              Body mass index   
(BMI) [Ratio]             53.6 kg/m2                Doreen Cabrera CNP  
Work Phone:   
[phone]                          [facility]  
Work Phone:   
[phone]  
   
                                                    2022   
15:                              Body surface area   
Derived from formula      2.36 m2                   Doreen Cabrera CNP  
Work Phone:   
[phone]                          [facility]  
Work Phone:   
[phone]  
   
                                                    2022   
15:                              Body surface area   
Derived from formula      2.4 m2                    Doreen Cabrera CNP  
Work Phone:   
[phone]                          [facility]  
Work Phone:   
[phone]  
   
                                                    2022   
15:          Body temperature    97.4 [degF]         Doreen Cabrera CNP  
Work Phone:   
[phone]                          [facility]  
Work Phone:   
[phone]  
   
                                                    2022   
15:          Body weight         141.52 kg           Doreen Cabrera CNP  
Work Phone:   
[phone]                          [facility]  
Work Phone:   
[phone]  
   
                                                    2022   
15:                              Diastolic blood   
pressure                  82 mm[Hg]                 Doreen Cabrera CNP  
Work Phone:   
[phone]                          [facility]  
Work Phone:   
[phone]  
   
                                                    2022   
15:          Heart rate          86 /min             Doreen Cabrera CNP  
Work Phone:   
[phone]                          [facility]  
Work Phone:   
[phone]  
   
                                                    2022   
15:                              SaO2% (BldA) [Mass   
fraction]                 98 %                      Doreen Cabrera CNP  
Work Phone:   
[phone]                          [facility]  
Work Phone:   
[phone]  
   
                                                    2022   
15:                              Systolic blood   
pressure                  124 mm[Hg]                Doreen Cabrera CNP  
Work Phone:   
[phone]                          [facility]  
Work Phone:   
[phone]  
   
                                                    2022   
15:          Body height         162.56 cm           Doreen Cabrera CNP  
Work Phone:   
[phone]                          [facility]  
Work Phone:   
[phone]  
   
                                                    2022   
15:                              Body mass index   
(BMI) [Ratio]             52.2 kg/m2                Doreen Cabrera CNP  
Work Phone:   
[phone]                          [facility]  
Work Phone:   
[phone]  
   
                                                    2022   
15:                              Body surface area   
Derived from formula      2.34 m2                   Doreen Cabrera CNP  
Work Phone:   
[phone]                          [facility]  
Work Phone:   
[phone]  
   
                                                    2022   
15:                              Body surface area   
Derived from formula      2.3 m2                    Doreen Cabrera CNP  
Work Phone:   
[phone]                          [facility]  
Work Phone:   
[phone]  
   
                                                    2022   
15:          Body temperature    99.3 [degF]         Doreen Cabrera CNP  
Work Phone:   
[phone]                          [facility]  
Work Phone:   
[phone]  
   
                                                    2022   
15:          Body weight         137.89 kg           Doreen Cabrera CNP  
Work Phone:   
[phone]                          [facility]  
Work Phone:   
[phone]  
   
                                                    2022   
15:                              Diastolic blood   
pressure                  94 mm[Hg]                 Doreen Cabrera CNP  
Work Phone:   
[phone]                          [facility]  
Work Phone:   
[phone]  
   
                                                    2022   
15:          Heart rate          105 /min            Doreen Cabrera CNP  
Work Phone:   
[phone]                          [facility]  
Work Phone:   
[phone]  
   
                                                    2022   
15:          Heart Rate Rhythm   1 1                 Doreen Cabrera CNP  
Work Phone:   
[phone]                          [facility]  
Work Phone:   
[phone]  
   
                                                    2022   
15:                              SaO2% (BldA) [Mass   
fraction]                 98 %                      Doreen Cabrera CNP  
Work Phone:   
[phone]                          [facility]  
Work Phone:   
[phone]  
   
                                                    2022   
15:                              Systolic blood   
pressure                  126 mm[Hg]                Doreen Cabrera CNP  
Work Phone:   
[phone]                          [facility]  
Work Phone:   
[phone]  
   
                                                    2022   
16:                              Diastolic blood   
pressure                  102 mm[Hg]                Doreen Cabrera CNP  
Work Phone:   
[phone]                          [facility]  
Work Phone:   
[phone]  
   
                                                    2022   
16:                              Systolic blood   
pressure                  150 mm[Hg]                Doreen Cabrera CNP  
Work Phone:   
[phone]                          [facility]  
Work Phone:   
[phone]  
   
                                                    2022   
15:          Body height         162.56 cm           Doreen Cabrera CNP  
Work Phone:   
[phone]                          [facility]  
Work Phone:   
[phone]  
   
                                                    2022   
15:                              Body mass index   
(BMI) [Ratio]             52.6 kg/m2                Doreen Cabrera CNP  
Work Phone:   
[phone]                          [facility]  
Work Phone:   
[phone]  
   
                                                    2022   
15:                              Body surface area   
Derived from formula      2.34 m2                   Doreen Cabrera CNP  
Work Phone:   
[phone]                          [facility]  
Work Phone:   
[phone]  
   
                                                    2022   
15:                              Body surface area   
Derived from formula      2.3 m2                    Doreen Cabrera CNP  
Work Phone:   
[phone]                          [facility]  
Work Phone:   
[phone]  
   
                                                    2022   
15:          Body temperature    98.5 [degF]         Doreen Cabrera CNP  
Work Phone:   
[phone]                          [facility]  
Work Phone:   
[phone]  
   
                                                    2022   
15:          Body weight         138.98 kg           Doreen Cabrera CNP  
Work Phone:   
[phone]                          [facility]  
Work Phone:   
[phone]  
   
                                                    2022   
15:                              Diastolic blood   
pressure                  108 mm[Hg]                Doreen Cabrera CNP  
Work Phone:   
[phone]                          [facility]  
Work Phone:   
[phone]  
   
                                                    2022   
15:          Heart rate          89 /min             Doreen Cabrera CNP  
Work Phone:   
[phone]                          [facility]  
Work Phone:   
[phone]  
   
                                                    2022   
15:                              SaO2% (BldA) [Mass   
fraction]                 99 %                      Doreen Cabrera CNP  
Work Phone:   
[phone]                          [facility]  
Work Phone:   
[phone]  
   
                                                    2022   
15:                              Systolic blood   
pressure                  152 mm[Hg]                Doreen Cabrera CNP  
Work Phone:   
[phone]                          [facility]  
Work Phone:   
[phone]  
   
                                                    2021   
13:          Body height         162.56 cm           Doreen Cabrera CNP  
Work Phone:   
[phone]                          [facility]  
Work Phone:   
[phone]  
   
                                                    2021   
13:                              Body mass index   
(BMI) [Ratio]             50.1 kg/m2                Doreen Cabrera CNP  
Work Phone:   
[phone]                          [facility]  
Work Phone:   
[phone]  
   
                                                    2021   
13:                              Body surface area   
Derived from formula      2.3 m2                    Doreen Cabrera CNP  
Work Phone:   
[phone]                          [facility]  
Work Phone:   
[phone]  
   
                                                    2021   
13:          Body temperature    96.8 [degF]         Doreen Cabrera CNP  
Work Phone:   
[phone]                          [facility]  
Work Phone:   
[phone]  
   
                                                    2021   
13:          Body weight         132.45 kg           Doreen Cabrera CNP  
Work Phone:   
[phone]                          [facility]  
Work Phone:   
[phone]  
   
                                                    2021   
13:                              Diastolic blood   
pressure                  86 mm[Hg]                 Doreen Cabrera CNP  
Work Phone:   
[phone]                          [facility]  
Work Phone:   
[phone]  
   
                                                    2021   
13:          Heart rate          86 /min             Doreen Cabrera CNP  
Work Phone:   
[phone]                          [facility]  
Work Phone:   
[phone]  
   
                                                    2021   
13:          Respiratory rate    18 /min             Doreen Cabrera CNP  
Work Phone:   
[phone]                          [facility]  
Work Phone:   
[phone]  
   
                                                    2021   
13:                              SaO2% (BldA) [Mass   
fraction]                 96 %                      Doreen Cabrera CNP  
Work Phone:   
[phone]                          [facility]  
Work Phone:   
[phone]  
   
                                                    2021   
13:                              Systolic blood   
pressure                  132 mm[Hg]                Doreen Cabrera CNP  
Work Phone:   
[phone]                          [facility]   
[facility]  
Work Phone:   
[phone]  
   
                                                    2021   
23:          Heart rate          100 /min            Seth Rahman MD  
Work Phone:   
[phone]                          [facility]  
Work Phone:   
[phone]  
   
                                                    2021   
23:                              Diastolic blood   
pressure                  94 mm[Hg]                 Seth Rahman MD  
Work Phone:   
[phone]                          [facility]  
Work Phone:   
[phone]  
   
                                                    2021   
23:                              Systolic blood   
pressure                  146 mm[Hg]                Seth Rahman MD  
Work Phone:   
[phone]                          [facility]  
Work Phone:   
[phone]  
   
                                                    2021   
22:                              SaO2% (BldA) [Mass   
fraction]                 97 %                      Seth Rahman MD  
Work Phone:   
[phone]                          [facility]  
Work Phone:   
[phone]  
   
                                                    2021   
14:                              Body mass index   
(BMI) [Ratio]             47.72 kg/m2               Seth Rahman MD  
Work Phone:   
[phone]                          [facility]  
Work Phone:   
[phone]  
   
                                                    2021   
14:          Body temperature    98.2 [degF]         Seth Rahman MD  
Work Phone:   
[phone]                          [facility]  
Work Phone:   
[phone]  
   
                                                    2021   
14:          Body weight         126.1 kg            Seth Rahman MD  
Work Phone:   
[phone]                          [facility]  
Work Phone:   
[phone]  
   
                                                    2021   
14:          Respiratory rate    16 /min             Seth Rahman MD  
Work Phone:   
[phone]                          [facility]  
Work Phone:   
[phone]  
   
                                                    2021   
16:          Body height         162.56 cm           Doreen Cabrera CNP  
Work Phone:   
[phone]                          [facility]  
Work Phone:   
[phone]  
   
                                                    2021   
16:                              Body mass index   
(BMI) [Ratio]             49.3 kg/m2                Doreen Cabrera CNP  
Work Phone:   
[phone]                          [facility]  
Work Phone:   
[phone]  
   
                                                    2021   
16:                              Body surface area   
Derived from formula      2.28 m2                   Doreen Cabrera CNP  
Work Phone:   
[phone]                          [facility]  
Work Phone:   
[phone]  
   
                                                    2021   
16:          Body temperature    97.4 [degF]         Doreen Cabrera CNP  
Work Phone:   
[phone]                          [facility]  
Work Phone:   
[phone]  
   
                                                    2021   
16:          Body weight         130.18 kg           Doreen Cabrera CNP  
Work Phone:   
[phone]                          [facility]  
Work Phone:   
[phone]  
   
                                                    2021   
16:                              Diastolic blood   
pressure                  98 mm[Hg]                 Doreen Cabrera CNP  
Work Phone:   
[phone]                          [facility]  
Work Phone:   
[phone]  
   
                                                    2021   
16:          Heart rate          85 /min             Doreen Cabrera CNP  
Work Phone:   
[phone]                          [facility]  
Work Phone:   
[phone]  
   
                                                    2021   
16:          Respiratory rate    18 /min             Doreen Cabrera CNP  
Work Phone:   
[phone]                          [facility]  
Work Phone:   
[phone]  
   
                                                    2021   
16:                              SaO2% (BldA) [Mass   
fraction]                 97 %                      Doreen Cabrera CNP  
Work Phone:   
[phone]                          [facility]  
Work Phone:   
[phone]  
   
                                                    2021   
16:                              Systolic blood   
pressure                  144 mm[Hg]                Doreen Mccormackobed DIAS  
Work Phone:   
[phone]                          [facility]   
[facility] [place]  
Work Phone:   
[phone]  
   
                                                    2021   
02:                              Diastolic blood   
pressure                  93 mm[Hg]                 Malika Shepherd MD  
Work Phone:   
[phone]                          [facility]  
Work Phone:   
[phone]  
   
                                                    2021   
02:          Heart rate          75 /min             Malika Shepherd MD  
Work Phone:   
[phone]                          [facility]  
Work Phone:   
[phone]  
   
                                                    2021   
02:          Respiratory rate    20 /min             Malika Shepherd MD  
Work Phone:   
[phone]                          [facility]  
Work Phone:   
[phone]  
   
                                                    2021   
02:                              SaO2% (BldA) [Mass   
fraction]                 100 %                     Malika Shepherd MD  
Work Phone:   
[phone]                          [facility]  
Work Phone:   
[phone]  
   
                                                    2021   
02:                              Systolic blood   
pressure                  150 mm[Hg]                Malika Shepherd MD  
Work Phone:   
[phone]                          [facility]  
Work Phone:   
[phone]  
   
                                                    2021   
20:          Body temperature    99.1 [degF]         Malika Shepherd MD  
Work Phone:   
[phone]                          [facility]  
Work Phone:   
[phone]  
   
                                                    2021   
11:          Body height         162.56 cm           Doreenpaola Cabrera CNP  
Work Phone:   
[phone]                          [facility]  
Work Phone:   
[phone]  
   
                                                    2021   
11:                              Body mass index   
(BMI) [Ratio]             48.9 kg/m2                Doreenpaola Cabrera CNP  
Work Phone:   
[phone]                          [facility]  
Work Phone:   
[phone]  
   
                                                    2021   
11:                              Body surface area   
Derived from formula      2.27 m2                   Doreen Cabrera CNP  
Work Phone:   
[phone]                          [facility]  
Work Phone:   
[phone]  
   
                                                    2021   
11:          Body temperature    98.2 [degF]         Doreen Cabrera CNP  
Work Phone:   
[phone]                          [facility]  
Work Phone:   
[phone]  
   
                                                    2021   
11:          Body weight         129.28 kg           Doreen Cabrera CNP  
Work Phone:   
[phone]                          [facility]  
Work Phone:   
[phone]  
   
                                                    2021   
11:                              Diastolic blood   
pressure                  86 mm[Hg]                 Doreen Cabrera CNP  
Work Phone:   
[phone]                          [facility]  
Work Phone:   
[phone]  
   
                                                    2021   
11:          Heart rate          101 /min            Doreen Cabrera CNP  
Work Phone:   
[phone]                          [facility]  
Work Phone:   
[phone]  
   
                                                    2021   
11:                              SaO2% (BldA) [Mass   
fraction]                 98 %                      Doreen Cabrera CNP  
Work Phone:   
[phone]                          [facility]  
Work Phone:   
[phone]  
   
                                                    2021   
11:                              Systolic blood   
pressure                  124 mm[Hg]                Doreen Cabrera CNP  
Work Phone:   
[phone]                          [facility]  
Work Phone:   
[phone]  
   
                                                    2021   
16:          Body height         162.6 cm            [employer]  
Work Phone:   
[phone]                          [facility]  
Work Phone:   
[phone]  
   
                                                    2021   
16:                              Body mass index   
(BMI) [Ratio]             47.2 kg/m2                [employer]  
Work Phone:   
[phone]                          [facility]  
Work Phone:   
[phone]  
   
                                                    2021   
16:          Body temperature    98.8 [degF]         Rafael Andes DO  
Work Phone:   
[phone]                          [facility]  
Work Phone:   
[phone]  
   
                                                    2021   
16:          Body weight         124.74 kg           Rafael Andes DO  
Work Phone:   
[phone]                          [facility]  
Work Phone:   
[phone]  
   
                                                    2021   
16:                              Diastolic blood   
pressure                  88 mm[Hg]                 Rafael Andes DO  
Work Phone:   
[phone]                          [facility]  
Work Phone:   
[phone]  
   
                                                    2021   
16:          Heart rate          78 /min             Rafael Andes DO  
Work Phone:   
[phone]                          [facility]  
Work Phone:   
[phone]  
   
                                                    2021   
16:          Respiratory rate    18 /min             Rafael Andes DO  
Work Phone:   
[phone]                          [facility]  
Work Phone:   
[phone]  
   
                                                    2021   
16:                              SaO2% (BldA) [Mass   
fraction]                 96 %                      Rafael Andes DO  
Work Phone:   
[phone]                          [facility]  
Work Phone:   
[phone]  
   
                                                    2021   
16:                              Systolic blood   
pressure                  138 mm[Hg]                Rafael Andes DO  
Work Phone:   
[phone]                          [facility]  
Work Phone:   
[phone]  
   
                                                    06-   
15:          Body height         162.56 cm           Doreen Cabrera Arideas  
Work Phone:   
[phone]                          [employer]   
[facility]  
Work Phone:   
[phone]  
   
                                                    06-   
15:                              Body mass index   
(BMI) [Ratio]             49.5 kg/m2                Doreen Cabrera Arideas  
Work Phone:   
[phone]                          [facility]  
Work Phone:   
[phone]  
   
                                                    06-   
15:                              Body surface area   
Derived from formula      2.29 m2                   Doreen Cabrera CNP  
Work Phone:   
[phone]                          [facility]  
Work Phone:   
[phone]  
   
                                                    06-   
15:          Body temperature    97.6 [degF]         Doreen Cabrera CNP  
Work Phone:   
[phone]                          [facility]  
Work Phone:   
[phone]  
   
                                                    06-   
15:          Body weight         130.82 kg           Doreen Cabrera CNP  
Work Phone:   
[phone]                          [facility]  
Work Phone:   
[phone]  
   
                                                    06-   
15:                              Diastolic blood   
pressure                  88 mm[Hg]                 Doreen Cabrera CNP  
Work Phone:   
[phone]                          [facility]  
Work Phone:   
[phone]  
   
                                                    06-   
15:          Heart rate          83 /min             Doreen Cabrera CNP  
Work Phone:   
[phone]                          [facility]  
Work Phone:   
[phone]  
   
                                                    06-   
15:          Respiratory rate    20 /min             Doreen Cabrera CNP  
Work Phone:   
[phone]                          [facility]  
Work Phone:   
[phone]  
   
                                                    06-   
15:                              SaO2% (BldA) [Mass   
fraction]                 98 %                      Doreen Cabrera CNP  
Work Phone:   
[phone]                          [facility]  
Work Phone:   
[phone]  
   
                                                    06-   
15:                              Systolic blood   
pressure                  130 mm[Hg]                Doreen Cabrera CNP  
Work Phone:   
[phone]                          [facility]  
Work Phone:   
[phone]  
   
                                                    2021   
15:                              Diastolic blood   
pressure                  108 mm[Hg]                Doreen Cabrera CNP  
Work Phone:   
[phone]                          [facility]  
Work Phone:   
[phone]  
   
                                                    2021   
15:                              Systolic blood   
pressure                  134 mm[Hg]                Doreen Cabrera CNP  
Work Phone:   
[phone]                          [facility]  
Work Phone:   
[phone]  
   
                                                    2021   
14:          Body height         162.56 cm           Doreen Cabrera CNP  
Work Phone:   
[phone]                          [facility]  
Work Phone:   
[phone]  
   
                                                    2021   
14:                              Body mass index   
(BMI) [Ratio]             49.8 kg/m2                Doreen Cabrera CNP  
Work Phone:   
[phone]                          [facility]  
Work Phone:   
[phone]  
   
                                                    2021   
14:                              Body surface area   
Derived from formula      2.29 m2                   Doreen Cabrera CNP  
Work Phone:   
[phone]                          [facility]  
Work Phone:   
[phone]  
   
                                                    2021   
14:          Body temperature    96.4 [degF]         Doreen Cabrera CNP  
Work Phone:   
[phone]                          [facility]  
Work Phone:   
[phone]  
   
                                                    2021   
14:          Body weight         131.54 kg           Doreen Cabrera CNP  
Work Phone:   
[phone]                          [facility]  
Work Phone:   
[phone]  
   
                                                    2021   
14:                              Diastolic blood   
pressure                  108 mm[Hg]                Doreen Cabrera CNP  
Work Phone:   
[phone]                          [facility]  
Work Phone:   
[phone]  
   
                                                    2021   
14:          Heart rate          97 /min             Doreen Cabrera CNP  
Work Phone:   
[phone]                          [facility]  
Work Phone:   
[phone]  
   
                                                    2021   
14:          Respiratory rate    18 /min             Doreen Cabrera CNP  
Work Phone:   
[phone]                          [facility]  
Work Phone:   
[phone]  
   
                                                    2021   
14:                              SaO2% (BldA) [Mass   
fraction]                 98 %                      Doreen Cabrera CNP  
Work Phone:   
[phone]                          [facility]  
Work Phone:   
[phone]  
   
                                                    2021   
14:                              Systolic blood   
pressure                  150 mm[Hg]                Doreen Cabrera CNP  
Work Phone:   
[phone]                          [facility]  
Work Phone:   
[phone]  
  
  
  
Encounters  
  
  
                          Encounter Date Encounter Type Care Provider Facility  
   
                                                    Start: 2024  
End: 2024           [facility] visit, estab pt      Radha Young LSW  
Work Phone:   
[phone]                          [facility]  
Work Phone:   
[phone]  
   
                                                    Start: 2024  
End: 2024           [facility] visit, estab pt      Leonie Short LISWS  
Work Phone:   
[phone]                          [facility]  
Work Phone:   
[phone]  
   
                                                    Start: 2024  
End: 2024                         Encounter for   
preprocedural laboratory   
examination                             Doreen Cabrera CNP  
Work Phone:   
[phone]                          [facility]  
Work Phone:   
[phone]  
   
                                                    Start: 2024  
End: 2024           [facility] visit, estab pt      Doreen Cabrera CNP  
Work Phone:   
[phone]                          [facility]  
Work Phone:   
[phone]  
   
                                                    Start: 2024  
End: 2024           [facility] visit, estab pt      Leonie Short LISWS  
Work Phone:   
[phone]                          [facility]  
Work Phone:   
[phone]  
   
                                                    Start: 2024  
End: 2024           [facility] visit, estab pt      Leonie Short LISWS  
Work Phone:   
[phone]                          [facility]  
Work Phone:   
[phone]  
   
                                                    Start: 2024  
End: 2024                         Emergency department   
patient visit             ABRAHAM MARIE          [facility]  
   
                                                    Start: 2024  
End: 2024           FQHC visit, estab pt      Leonie HUTCHINSON  
Work Phone:   
[phone]                          [facility]  
Work Phone:   
[phone]  
   
                                                    Start: 2024  
End: 2024           General                   Doreen Cabrera CNP  
Work Phone:   
[phone]                          [facility]  
Work Phone:   
[phone]  
   
                                                    Start: 2023  
End: 2023           General                   Brandy Beaulieu DDS  
Work Phone:   
[phone]                          [facility]  
Work Phone:   
[phone]  
   
                                                    Start: 2023  
End: 2023           General                   Brandy Beaulieu DDS  
Work Phone:   
[phone]                          [facility]  
Work Phone:   
[phone]  
   
                                                    Start: 2023  
End: 2023           [facility]QHC visit, estab pt      Doreen Cabrera CNP  
Work Phone:   
[phone]                          [facility]  
Work Phone:   
[phone]  
   
                                        Start: 07-   comprehensive oral   
evaluation - new or   
established patient                     Brandy Beaulieu DDS  
Work Phone:   
[phone]                          [facility]  
   
                                                    Start: 07-  
End: 07-           General                   Yue Ronquillo RD  
Work Phone:   
[phone]                          [facility]  
Work Phone:   
[phone]  
   
                                                    Start: 10-  
End: 10-     ambulatory          DOREEN CABRERA      [facility]  
   
                                                    Start: 10-  
End: 10-                         Subsequent hospital visit   
by physician                            Doreen MCCORMACK -   
CNP  
Work Phone:   
[phone]                          [facility]   
CTR  
   
                                                    Start: 10-  
End: 10-           FQHC visit, estab pt      Doreen Cabrera CNP  
Work Phone:   
[phone]                          [facility]  
Work Phone:   
[phone]  
   
                                                    Start: 2022  
End: 2022           General                   Haylie Aguilar   
[facility]  
Work Phone:   
[phone]                          [facility]   
[facility]  
Work Phone:   
[phone]  
   
                                                    Start: 2022  
End: 2022           ambulatory                Julieth Pisano CNP  
Work Phone:   
[phone]                          [facility]  
Work Phone:   
[phone]  
   
                                                    Start: 2022  
End: 2022           General                   Julieth Aliya CNP  
Work Phone:   
[phone]                          [facility]  
Work Phone:   
[phone]  
   
                                                    Start: 2022  
End: 2022           FQHC visit, estab pt      Haylie Aguilar   
[facility]  
Work Phone:   
[phone]                          [facility]  
Work Phone:   
[phone]  
   
                                                    Start: 2022  
End: 2022           FQHC visit, estab pt      Haylie Aguilar   
[facility]  
Work Phone:   
[phone]                          [facility]  
Work Phone:   
[phone]  
   
                                                    Start: 2022  
End: 2022           ambulatory                Julieth Menaer CNP  
Work Phone:   
[phone]                          [facility]  
Work Phone:   
[phone]  
   
                                                    Start: 2022  
End: 2021           General                   Julieth Aliya CNP  
Work Phone:   
[phone]                          [facility]  
Work Phone:   
[phone]  
   
                                                    Start: 10-  
End: 10-     ambulatory          DOREEN CABRERA      [facility]  
l  
   
                                                    Start: 10-  
End: 10-                         Subsequent hospital visit   
by physician              [facility] EKG  
   
                                        Comment on above:   Tachycardia   
   
                                                    Start: 2021  
End: 2021           FQHC visit, estab pt      vAis Felder   
[facility]  
Work Phone:   
[phone]                          [facility]  
Work Phone:   
[phone]  
   
                                                    Start: 2021  
End: 2021           FQHC visit, estab pt      Avis Felder   
[facility]  
Work Phone:   
[phone]                          [facility]  
Work Phone:   
[phone]  
   
                                                    Start: 09-  
End: 2021                         Evaluation and management   
of inpatient              [facility]  
   
                                                    Start: 2021  
End: 09-                         Emergency department   
patient visit             ISMAEL ORTEGA          [facility]  
   
                                                    Start: 2021  
End: 2021                         Emergency department   
patient visit                           Seth Rahman MD  
Work Phone:   
[phone]                          [facility]   
ED  
   
                                        Comment on above:   Bipolar 1 disorder (  
HCC) (Primary Dx);  
Homicidal ideation   
   
                                                    Start: 2021  
End: 2021           FQHC visit, estab pt      Leonie HUTCHINSON  
Work Phone:   
[phone]                          [facility]  
Work Phone:   
[phone]  
   
                                                    Start: 2021  
End: 2021                         Emergency department   
patient visit             MALIKA MANN [facility]  
   
                                                    Start: 2021  
End: 2021                         Emergency department   
patient visit                           Malika Shepherd MD  
Work Phone:   
[phone]                          [facility]   
ED  
   
                                        Comment on above:   Anxiety state (Prima  
ry Dx)   
   
                                                    Start: 2021  
End: 2021           ambulatory                Pamela Maguire CNP  
Work Phone:   
[phone]                          [facility]  
Work Phone:   
[phone]  
   
                                                    Start: 2021  
End: 2021           General                   Pamela Maguire CNP  
Work Phone:   
[phone]                          [facility]  
Work Phone:   
[phone]  
   
                                                    Start: 2021  
End: 2021           FQHC visit, estab pt      Leonie HUTCHINSON  
Work Phone:   
[phone]                          [facility]  
Work Phone:   
[phone]  
   
                                                    Start: 2021  
End: 2021           FQHC visit, estab pt      Leonie Short LISWS  
Work Phone:   
[phone]                          [facility]  
Work Phone:   
[phone]  
   
                                                    Start: 2021  
End: 2021                         Emergency department   
patient visit             RAFAEL GALLAGHER              [facility]  
   
                                                    Start: 2021  
End: 2021                         Emergency department   
patient visit                           Rafael Gallagher DO  
Work Phone:   
[phone]                          [facility]   
ED  
   
                                        Comment on above:   Depression with suic  
idal ideation (Primary Dx)   
   
                                                    Start: 06-  
End: 06-           FQHC visit, estab pt      Leonie Short LISWS  
Work Phone:   
[phone]                          [facility]  
Work Phone:   
[phone]  
   
                                                    Start: 06-  
End: 06-           FQHC visit, estab pt      Leonie Short LISWS  
Work Phone:   
[phone]                          [facility]  
Work Phone:   
[phone]  
   
                                                    Start: 2021  
End: 2021           FQHC visit, estab pt      Leonie Short LISWS  
Work Phone:   
[phone]                          [facility]  
Work Phone:   
[phone]  
   
                                                    Start: 2021  
End: 2021           FQHC visit new patient    Doreen Cabrera CNP  
Work Phone:   
[phone]                          [facility]  
Work Phone:   
[phone]  
   
                                                    Start: 08-  
End: 08-                         Patient encounter   
procedure                 ANYA GOLDBERG MARSHALL           Facility:  
   
                                                    Start: 2017  
End: 2017                         Emergency department   
patient visit             MD HALLE ANGEL     Facility:[facility]  
  
  
  
Procedures  
  
  
                          Date         Procedure    Procedure Detail Performing   
Clinician  
   
                                        Start: 2024   Application of silve  
r   
diamine fluoride by   
physician                                           Doreen Cabrera CNP  
Work Phone:   
[phone]  
   
                                        Start: 2024   Behav assmt w/score   
&   
docd/stand instrument                               Radha Young LSW  
Work Phone:   
[phone]  
   
                                        Start: 2024   Eye img vld mtch dx   
7   
stnd fld w/o evc rtnopthy                           Doreen Cabrera CNP  
Work Phone:   
[phone]  
   
                                        Start: 2024   Foot examination   
performed                                           Doreen Cabrera CNP  
Work Phone:   
[phone]  
   
                                        Start: 2024   Fundus photography   
w/interpretation & report                           Doreen Cabrera CNP  
Work Phone:   
[phone]  
   
                                        Start: 2024   Gluc bld gluc mntr d  
ev   
cleared fda spec home use                           Doreen Cabrera CNP  
Work Phone:   
[phone]  
   
                                        Start: 2024   Most recent diastoli  
c   
blood pressure 80-89 mm   
hg                                                  Doreen Cabrera CNP  
Work Phone:   
[phone]  
   
                                        Start: 2024   Most recent systolic  
   
blood press 130-139mm hg                            Doreen Cabrera CNP  
Work Phone:   
[phone]  
   
                                        Start: 2024   Collection venous bl  
ood   
venipuncture                                        Doreen Cabrera CNP  
Work Phone:   
[phone]  
   
                                        Start: 2024   Current tobacco non-  
user   
cad cap copd pv dm                                  Doreen Cabrera CNP  
Work Phone:   
[phone]  
   
                                        Start: 2024   Gluc bld gluc mntr d  
ev   
cleared fda spec home use                           Doreen Cabrera CNP  
Work Phone:   
[phone]  
   
                                        Start: 2024   Most recent hg a1c>e  
qual   
to 7.0%&<8.0%                                       Doreen Cabrera CNP  
Work Phone:   
[phone]  
   
                          Start: 2024 Pneumococcal Prevnar 20               
 Doreen Cabrera CNP  
Work Phone:   
[phone]  
   
                                        Start: 2024   Psychotherapy w/tabatha  
ent   
30 minutes                                          Leonie Short LISYABUY  
Work Phone:   
[phone]  
   
                                        Start: 2024   Behav assmt w/score   
&   
docd/stand instrument                               Leonie Short LISWS  
Work Phone:   
[phone]  
   
                                        Start: 2024   Current tobacco non-  
user   
cad cap copd pv dm                                  Doreen Cabrera CNP  
Work Phone:   
[phone]  
   
                                        Start: 2024   Most recent diastoli  
c   
blood pressure 80-89 mm   
hg                                                  Doreen Cabrera CNP  
Work Phone:   
[phone]  
   
                                        Start: 2024   Most recent systolic  
   
blood press 130-139mm hg                            Doreen Cabrera CNP  
Work Phone:   
[phone]  
   
                                        Start: 2024   Pt scrnd tobacco use  
 rcvd   
tobacco cessation talk                              Doreen Cabrera CNP  
Work Phone:   
[phone]  
   
                                        Start: 2024   Current tobacco non-  
user   
cad cap copd pv dm                                  Doreen Cabrera CNP  
Work Phone:   
[phone]  
   
                                        Start: 2024   Gluc bld gluc mntr d  
ev   
cleared fda spec home use                           Doreen Cabrera CNP  
Work Phone:   
[phone]  
   
                                        Start: 2024   Most recent diastoli  
c   
blood pressure 80-89 mm   
hg                                                  Doreen Cabrera CNP  
Work Phone:   
[phone]  
   
                                        Start: 2024   Most recent hemoglob  
in   
a1c level < 7.0%                                    Doreen Cabrera CNP  
Work Phone:   
[phone]  
   
                                        Start: 2024   Most recent systolic  
   
blood press 130-139mm hg                            Doreen Cabrera CNP  
Work Phone:   
[phone]  
   
                                        Start: 2024   Psychotherapy w/tabatha  
ent   
30 minutes                                          Leonie HUTCHINSON  
Work Phone:   
[phone]  
   
                                        Start: 2024   Pt scrnd tobacco use  
 rcvd   
tobacco cessation talk                              Doreen Cabrera CNP  
Work Phone:   
[phone]  
   
                                Start: 2024 Screening for disorder Visit F  
or: Screening   
For Disorder                            Leonie HUTCHINSON  
Work Phone:   
[phone]  
   
                                        Start: 2024   Venereal disease   
screening                               Visit For: Screening   
Exam Std                                Doreen Cabrera CNP  
Work Phone:   
[phone]  
   
                                        Start: 2023   resin-based composit  
e -   
one surface, posterior                              Brandy Beaulieu DDS  
Work Phone:   
[phone]  
   
                                        Start: 2023   resin-based composit  
e -   
one surface, posterior                              Brandy Beaulieu DDS  
Work Phone:   
[phone]  
   
                                        Start: 2023   Hemoglobin glycosyla  
geraldine   
a1c                                                 Doreen Deborah CNP  
Work Phone:   
[phone]  
   
                                        Start: 2023   Most recent diastoli  
c   
blood pressure < 80 mm hg                           Doreen Cabrera CNP  
Work Phone:   
[phone]  
   
                                        Start: 2023   Most recent hemoglob  
in   
a1c level < 7.0%                                    Doreen Cabrera CNP  
Work Phone:   
[phone]  
   
                                        Start: 2023   Most recent systolic  
   
blood pressure <130 mm hg                           Doreen Cabrera CNP  
Work Phone:   
[phone]  
   
                                        Start: 07-   caries risk assessme  
nt   
and documentation, with a   
finding of high risk                                Yue Ronquillo RD  
Work Phone:   
[phone]  
   
                                        Start: 07-   intraoral - complete  
   
series of radiographic   
images                                              Yue Ronquillo RD  
Work Phone:   
[phone]  
   
                                        Start: 07-   panoramic radiograph  
ic   
image                                               Yue Ronquillo RD  
Work Phone:   
[phone]  
   
                          Start: 07- prophylaxis - adult              Tommy Ronquillo RD  
Work Phone:   
[phone]  
   
                                        Start: 10-   Most recent diastoli  
c   
blood pressure 80-89 mm   
hg                                                  Doreen Cabrera CNP  
Work Phone:   
[phone]  
   
                                        Start: 10-   Most recent systolic  
   
blood pressure <130 mm hg                           Doreen Cabrera CNP  
Work Phone:   
[phone]  
   
                                        Start: 2022   Psychotherapy w/tabatha  
ent   
30 minutes                                          Haylie Aguilar LPCC-S  
Work Phone:   
[phone]  
   
                                        Start: 2022   Most recent diastoli  
c   
blood pressure 80-89 mm   
hg                                                  Julieth Aliya CNP  
Work Phone:   
[phone]  
   
                                        Start: 2022   Most recent systolic  
   
blood pressure <130 mm hg                           Julieth Aliya CNP  
Work Phone:   
[phone]  
   
                                        Start: 2022   Psychotherapy w/tabatha  
ent   
30 minutes                                          Haylie Aguilar LPCC-S  
Work Phone:   
[phone]  
   
                                        Start: 2022   Most recent diastol   
blood   
pres >/equal 90 mm hg                               Doreen Cabrera CNP  
Work Phone:   
[phone]  
   
                                        Start: 2022   Most recent systolic  
   
blood pressure <130 mm hg                           Doreen Cabrera [facility]  
Work Phone:   
[phone]  
   
                                        Start: 2022   Psychotherapy w/tabatha  
ent   
30 minutes                                          Haylie Aguilar [facility]  
Work Phone:   
[phone]  
   
                                        Start: 2022   Hemoglobin glycosyla  
geraldine   
a1c                                                 Julieth Pisano [facility]  
Work Phone:   
[phone]  
   
                                        Start: 2022   Most recent diastol   
blood   
pres >/equal 90 mm hg                               Julieth Menaer [facility]  
Work Phone:   
[phone]  
   
                                        Start: 2022   Most recent hemoglob  
in   
a1c level < 7.0%                                    Julieth Menaer [facility]  
Work Phone:   
[phone]  
   
                                        Start: 2022   Most recent systolic  
   
blood pres>/equal 140 mm   
hg                                                  Julieth Pisano [facility]  
Work Phone:   
[phone]  
   
                                        Start: 2022   Psychotherapy w/tabatha  
ent   
30 minutes                                          Haylie Aguilar [facility]  
Work Phone:   
[phone]  
   
                                        Start: 2021   Antibody hiv-1&hiv-2  
   
single result                                       Doreen Cabrera [facility]  
Work Phone:   
[phone]  
   
                                        Start: 2021   Most recent diastoli  
c   
blood pressure 80-89 mm   
hg                                                  Doreen Cabrera [facility]  
Work Phone:   
[phone]  
   
                                        Start: 2021   Most recent systolic  
   
blood press 130-139mm hg                            Doreen Cabrera [facility]  
Work Phone:   
[phone]  
   
                                        Start: 2021   Psychotherapy w/tabatha  
ent   
30 minutes                                          Avis Felder   
[facility]  
Work Phone:   
[phone]  
   
                                        Start: 2021   Pt scrnd tobacco use  
 rcvd   
tobacco cessation talk                              Doreen Cabrera [facility]  
Work Phone:   
[phone]  
   
                          Start: 2021 COVID-19, RAPID              Seth Rahman MD  
Work Phone:   
[phone]  
   
                          Start: 2021 Drug screen class list a              
  Seth Rahman MD  
Work Phone:   
[phone]  
   
                                        Start: 2021   Urnls dip stick/tabl  
et   
reagent auto microscopy                             Seth Rahman MD  
Work Phone:   
[phone]  
   
                          Start: 2021 Assay of acetaminophen                
Seth Rahman MD  
Work Phone:   
[phone]  
   
                          Start: 2021 Assay of ethanol              Meghna Rahman MD  
Work Phone:   
[phone]  
   
                          Start: 2021 Assay of salicylate              Cip  
alysia Rahman MD  
Work Phone:   
[phone]  
   
                                        Start: 2021   Comprehensive metabo  
lic   
panel                                               Seth Rahman MD  
Work Phone:   
[phone]  
   
                                        Start: 2021   Ecg routine ecg w/le  
ast   
12 lds w/i&r                                        Seth Rahman MD  
Work Phone:   
[phone]  
   
                                        Start: 2021   Most recent diastoli  
c   
blood pressure < 80 mm hg                           Doreenpaola Cabrera [facility]  
Work Phone:   
[phone]  
   
                                        Start: 2021   Most recent systolic  
   
blood pressure <130 mm hg                           [facility]  
Work Phone:   
[phone]  
   
                                        Start: 2021   Psychotherapy w/tabatha  
ent   
30 minutes                                          Leonie Short LISWS  
Work Phone:   
[phone]  
   
                          Start: 2021 COVID-19, RAPID              Malika Shepherd MD  
Work Phone:   
[phone]  
   
                          Start: 2021 Assay of acetaminophen                
Malika Shepherd MD  
Work Phone:   
[phone]  
   
                          Start: 2021 Assay of ethanol              Malika Shepherd MD  
Work Phone:   
[phone]  
   
                          Start: 2021 Assay of salicylate              Maykel Shepherd MD  
Work Phone:   
[phone]  
   
                                        Start: 2021   Comprehensive metabo  
lic   
panel                                               Malika Shepherd MD  
Work Phone:   
[phone]  
   
                          Start: 2021 Drug screen class list mann Shepherd MD  
Work Phone:   
[phone]  
   
                                        Start: 2021   Urinalysis microscop  
ic   
only                                                Malika Shepherd MD  
Work Phone:   
[phone]  
   
                                        Start: 2021   Urnls dip stick/tabl  
et   
rgnt auto w/o microscopy                            Malika Shepherd MD  
Work Phone:   
[phone]  
   
                                        Start: 2021   Most recent diastoli  
c   
blood pressure 80-89 mm   
hg                                                  [facility] CNP  
Work Phone:   
[phone]  
   
                                        Start: 2021   Most recent systolic  
   
blood pressure <130 mm hg                           [facility] CNP  
Work Phone:   
[phone]  
   
                                        Start: 2021   Psychotherapy w/tabatha  
ent   
30 minutes                                          Leonie Short LISWS  
Work Phone:   
[phone]  
   
                                        Start: 2021   Ecg routine ecg w/le  
ast   
12 lds w/i&r                                        Rafael Andes DO  
Work Phone:   
[phone]  
   
                          Start: 2021 Assay of acetaminophen                
Rafael Andes DO  
Work Phone:   
[phone]  
   
                          Start: 2021 Assay of ethanol              Rafael  
 Andes DO  
Work Phone:   
[phone]  
   
                          Start: 2021 Assay of salicylate              Jus  
tin Andes DO  
Work Phone:   
[phone]  
   
                                        Start: 2021   Comprehensive metabo  
lic   
panel                                               Rafael Andes DO  
Work Phone:   
[phone]  
   
                          Start: 2021 SPECIMEN REJECTION              Just  
in Andes DO  
Work Phone:   
[phone]  
   
                          Start: 2021 COVID-19, RAPID              Rafael   
Andes DO  
Work Phone:   
[phone]  
   
                          Start: 2021 Drug screen class list a              
  Rafael Andes DO  
Work Phone:   
[phone]  
   
                                        Start: 2021   Urnls dip stick/tabl  
et   
reagent auto microscopy                             Seth Rahman MD  
Work Phone:   
[phone]  
   
                                        Start: 06-   Most recent diastoli  
c   
blood pressure 80-89 mm   
hg                                                  [facility]  
Work Phone:   
[phone]  
   
                                        Start: 06-   Most recent systolic  
   
blood pressure <130 mm hg                           Doreen Cabrera CNP  
Work Phone:   
[phone]  
   
                                        Start: 06-   Psychotherapy w/tabatha  
ent   
30 minutes                                          Leonie Garrett LISWS  
Work Phone:   
[phone]  
   
                                        Start: 2021   Ear Pressure Equaliz  
ation   
Tube, Insertion,   
Bilaterally                                         Doreen Cabrera CNP  
Work Phone:   
[phone]  
   
                                        Start: 2021   Most recent diastol   
blood   
pres >/equal 90 mm hg                               Doreen Cabrera CNP  
Work Phone:   
[phone]  
   
                                        Start: 2021   Most recent systolic  
   
blood pres>/equal 140 mm   
hg                                                  Doreen Cabrera CNP  
Work Phone:   
[phone]  
   
                                        Start: 2021   Pt-focused hlth risk  
   
assmt score doc stnd   
instrm                                              Doreen Cabrera CNP  
Work Phone:   
[phone]  
   
                          Start: 2021 Tonsillectomy              Doreenpaola cuevas CNP  
Work Phone:   
[phone]  
  
  
  
Plan of Treatment  
  
  
                          Date         Care Activity Detail       Author  
   
                                Start: 10- [facility] visit, estab pt Medical E  
stablished   
Patient                                 [facility]  
Work Phone:   
[phone]  
   
                                        Start: 10-   CBC W Auto Different  
ial   
panel - Blood                                       [facility]  
   
                                Start: 2024 [facility] visit, estab pt Medical E  
stablished   
Patient                                 [facility]  
Work Phone:   
[phone]  
   
                                                    Start: 2024  
End: 2024                         Patient education based   
on identified need                                  [facility]  
   
                          Start: 2024              US Transvaginal (18721)  
 [facility]  
   
                                                    Start: 2024  
End: 2024                         Patient education based   
on identified need                                  [facility]  
   
                                Start: 2024 [facility] visit, estab pt Medical E  
stablished   
Patient                                 [facility]  
Work Phone:   
[phone]  
   
                                                    Start: 2024  
End: 2024                         Patient education based   
on identified need                                  [facility]  
   
                                        Start: 2024   Ferritin [Mass/volum  
e]   
in Serum or Plasma                                  [facility]   
[place]  
   
                                                    Start: 2024  
End: 2024                         Patient education based   
on identified need                                  [facility]  
   
                                Start: 2024 [facility] visit, estab pt Medical E  
stablished   
Patient                                 [facility]  
Work Phone:   
[phone]  
   
                                Start: 2024                 All 3 Urine Te  
st   
Gonorrhea-Chlamydia-Tri  
[facility]  
   
                                                    Start: 2024  
End: 2024                         Patient education based   
on identified need                                  [facility]  
   
                                                    Start: 2023  
End: 2023                         Patient education based   
on identified need                                  [facility]  
   
                          Start: 2023              Psychiatry   [facility]  
Work Phone:   
[phone]  
   
                                        Comment on above:   Note: Please make a   
referral to: see if dr alonso accepting   
now,   
otherwise ok with arminda or edy   
   
                                Start: 2023 [facility] visit, estab pt Medical E  
stablished   
Patient                                 [facility]  
Work Phone:   
[phone]  
   
                                                    Start: 10-  
End: 10-                         Patient education based   
on identified need                                  [facility]  
   
                                Start: 2022                 All 3 Urine Te  
st   
Gonorrhea-Chlamydia-Tri  
[facility]  
   
                                Start: 09- [facility] visit, estab pt Medical E  
stablished   
Patient                                 [facility]  
Work Phone:   
[phone]  
   
                                                    Start: 2022  
End: 2022                         Patient education based   
on identified need                      [facility] offered active   
listening and   
supportive feedback;   
normalized emotions and   
feelings, also provided   
pt time to process any   
current stressors.   
~Promoted and   
encouraged   
follow-through with   
scheduling psychiatric   
services                                [facility]  
   
                                                    Start: 2022  
End: 2022                         Patient education based   
on identified need                                  [facility]  
   
                          Start: 2022                           [facility]  
   
                          Start: 2022 [facility] visit, estab pt              Ti  
[facility]  
Work Phone:   
[phone]  
   
                                        Comment on above:   Note: Please make a   
referral to: request dr alonso   
   
                                                    Start: 2022  
End: 2022                         Patient education based   
on identified need                                  [facility]  
   
                                                    Start: 2022  
End: 2022                         Patient education based   
on identified need                      [facility]   
[facility]   
[facility] ~[facility] offered   
active listening and   
supportive feedback;   
normalized emotions and   
feelings, also provided   
pt time to process any   
current stressors ~[facility]   
discussed potential   
benefits of counseling   
and supported   
re-engaging, as needed.   
~[facility] encouraged pt to   
continue to make time   
to implement self-care   
regimen and use coping   
methods, as needed                      [facility]  
   
                                        Start: 2022   CBC W Auto Different  
ial   
panel - Blood                                       [facility]  
   
                                        Start: 2022   Lipid 1996 panel - S  
wayne   
or Plasma                 LIPID PROFILE             [facility]  
   
                                                    Start: 2022  
End: 2022                         Patient education based   
on identified need                                  [facility]  
   
                          Start: 2022 Influenza vaccination Flu vaccine (#  
1) [facility]  
   
                                        Start: 2021   COVID-19 Vaccine (3   
-   
Booster for Pfizer   
series)                                 COVID-19 Vaccine (3 -   
Booster for Pfizer   
series)                                 [facility]  
   
                          Start: 10-                           [facility]  
   
                                Start: 2021 [facility] visit, estab pt Medical E  
stablished   
Patient                                 [facility]  
Work Phone:   
[phone]  
   
                          Start: 2021                           [facility]  
Work Phone:   
[phone]  
   
                                        Comment on above:   Note: Please make a   
referral to: [address] [id number]: [phone]:   
[phone]   
   
                                                            Note: Please make a   
referral to: [facility] , no longer wants to   
see vinny   
   
                                                    Start: 2021  
End: 2021                         Patient education based   
on identified need                                  [facility]  
   
                          Start: 2021 Influenza vaccination              M  
[facility]  
Work Phone:   
[phone]  
   
                                                    Start: 2021  
End: 2021                         Patient education based   
on identified need                                  [facility]  
   
                          Start: 2021              SARS-CoV-2, PAMELA [facility]  
   
                                                    Start: 2021  
End: 2021                         Patient education based   
on identified need                                  [facility]  
   
                                                    Start: 06-  
End: 06-                         Patient education based   
on identified need                                  [facility]  
   
                                                    Start: 2021  
End: 2021                         Patient education based   
on identified need                                  [facility]  
   
                                        Start: 2020   DTaP/Tdap/Td vaccine  
 (7   
- Td or Tdap)                           DTaP/Tdap/Td vaccine (7   
- Td or Tdap)                           [facility]  
   
                                        Start: 2020   Screening for malign  
ant   
neoplasm of cervix                                  [facility]   
[employer]  
   
                                        Start: 2018   DTaP/Tdap/Td vaccine  
 (1   
- Tdap)                                 DTaP/Tdap/Td vaccine (1   
- Tdap)                                 [facility]  
Work Phone:   
[phone]  
   
                          Start: 2017 Hepatitis C screening Hepatitis C sc  
reen [facility]   
[employer]  
   
                                        Start: 2015   Screening for Chlamy  
argelia   
trachomatis                                         [facility]   
[employer]  
   
                          Start: 2014 HIV screening HIV screen   [facility]  
Work Phone:   
[phone]  
   
                          Start: 2011 COVID-19 Vaccine (1) COVID-19 Vaccin  
e (1) [facility]  
Work Phone:   
[phone]  
   
                          Start: 2011 [facility]  
[facility]  
   
                                        Start: 2010   HPV vaccine (1 - 2-d  
ose   
series)                                 HPV vaccine (1 - 2-dose   
series)                                 [facility]   
[employer]  
   
                                        Start: 2000   Varicella vaccine (1  
 of   
2 - 2-dose childhood   
series)                                 Varicella vaccine (1 of   
2 - 2-dose childhood   
series)                                 [facility]  
   
                          Start: 1999 Hepatitis C screening Hepatitis C sc  
reen [facility]  
Work Phone:   
[phone]  
   
                                                      
End: 10-                         C.trachomatis   
N.gonorrhoeae DNA, Urine                            [facility]  
[employer] Phone:   
[phone]  
   
                                        Comment on above:   Once for 1 Occurrenc  
es starting 10/21/2022 until 10/21/2022   
   
                                                EKG 12 Lead     EKG 12 Lead ECG   
STAT   
2021 3:18 PM EDT                  [facility]  
Work Phone:   
[phone]  
   
                                                      
End: 10-                         Trichomonas Vaginali,   
Molecular                                           [facility]  
Work Phone:   
[phone]  
   
                                        Comment on above:   Once for 1 Occurrenc  
es starting 10/21/2022 until 10/21/2022   
  
  
  
Immunizations  
  
  
                      Immunization Date Immunization Notes      Care Provider Mandeep deshpande  
   
                                        2024          influenza, injectabl  
e,   
quadrivalent,   
preservative free;   
Translations: [Fluarix]                             Doreen Cabrera [facility]  
Work Phone:   
[phone]                          [facility]  
   
                                        Comment on above:   Note: Patient tolera  
geraldine well. No signs or symptoms of adverse   
reactions. Patient waited a minimum of 15 minutes.   
   
                                        2024          Imm.Admin. over 18 y  
rs   
Any Route FIRST Injection   
(Rendering physician   
modifier)                                           Doreen Cabrera [facility]  
Work Phone:   
[phone]                          [facility]  
   
                                        2024          Human Papillomavirus  
   
9-valent vaccine;   
Translations: [GARDASIL   
9]                                                  Doreen Cabrera [facility]  
Work Phone:   
[phone]                          [facility]  
   
                                        2024          pneumococcal vaccine  
,   
unspecified formulation                             Doreen Cabrera [facility]  
Work Phone:   
[phone]                          [facility]  
   
                                        Comment on above:   Note: Patient tolera  
geraldine well. No signs or symptoms of adverse   
reactions. Patient waited a minimum of 15 minutes.   
   
                                        2024          Imm.Admin.Through 17  
 yrs   
Any Route FIRST Injection                           Doreen Cabrera CNP  
Work Phone:   
[phone]                          [facility]  
   
                                        2024          Ea.Addnl.Imm.Admin.T  
hroug  
h 17 yrs Any Route                                  Doreen Cabrera CNP  
Work Phone:   
[phone]                          [facility]  
   
                                        2024          VFC Imm. Admin. thro  
ugh   
17 yrs Any Route per C   
Injection (Signi/Sep   
Eval. & Man.)                                       Doreen Cabrera CNP  
Work Phone:   
[phone]                          [facility]  
   
                                        10-          influenza, injectabl  
e,   
quadrivalent,   
preservative free;   
Translations: [Fluarix]                             Doreen Cabrera CNP  
Work Phone:   
[phone]                          [facility]  
   
                                        Comment on above:   Note: Patient tolera  
geraldine well. No signs or symptoms of adverse   
reactions. Patient waited a minimum of 15 minutes.   
   
                                        10-          Imm.Admin. over 17 y  
rs   
Any Route FIRST Injection                           Doreen Cabrera [facility]  
Work Phone:   
[phone]                          [facility]  
   
                                        2021          1st Dose PFIZER COVI  
D-19   
Vaccine                                             Doreen Cabrera [facility]  
Work Phone:   
[phone]                          [facility]  
   
                                        2021          1st Dose PFIZER COVI  
D-19   
Vaccine                                             Doreen Cabrera [facility]  
Work Phone:   
[phone]                          [facility]  
   
                                        2016          meningococcal   
oligosaccharide (groups   
A, C, Y and W-135)   
diphtheria toxoid   
conjugate vaccine (MCV4O)                           Doreen Cabrera [facility]  
Work Phone:   
[phone]                          [facility]  
   
                                        2010          tetanus toxoid, redu  
kyleigh   
diphtheria toxoid, and   
acellular pertussis   
vaccine, adsorbed                                   Doreen Cabrera [facility]  
Work Phone:   
[phone]                          [facility]  
   
                                        2004          measles, mumps and   
rubella virus vaccine                               Doreen Cabrera [facility]  
Work Phone:   
[phone]                          [facility]  
   
                                        2004          poliovirus vaccine,   
inactivated                                         Doreen Cabrera [facility]  
Work Phone:   
[phone]                          [facility]  
   
                                        2000          measles, mumps and   
rubella virus vaccine                               Doreen Cabrera [facility]  
Work Phone:   
[phone]                          [facility]  
   
                                        2000          poliovirus vaccine,   
inactivated                                         Doreen Cabrera [facility]  
Work Phone:   
[phone]                          [facility]  
   
                                        2000          haemophilus influenz  
ae   
type b conjugate and   
Hepatitis B vaccine                                 Doreen Cabrera [facility]  
Work Phone:   
[phone]                          [facility]  
   
                                        1999          poliovirus vaccine,   
inactivated                                         Doreen Cabrera [facility]  
Work Phone:   
[phone]                          [facility]  
   
                                        1999          diphtheria, tetanus   
toxoids and pertussis   
vaccine                                             Doreen Deborah [facility]  
Work Phone:   
[phone]                          [facility]  
   
                                        1999          trivalent poliovirus  
   
vaccine, live, oral                                 Doreen Cabrera CNP  
Work Phone:   
[phone]                          [facility]  
   
                                        1999          hepatitis B vaccine,  
   
pediatric or   
pediatric/adolescent   
dosage                                              Doreen Cabrera CNP  
Work Phone:   
[phone]                          [facility]  
   
                                        1999          hepatitis B vaccine,  
   
pediatric or   
pediatric/adolescent   
dosage                                              Doreen Cabrera CNP  
Work Phone:   
[phone]                          [facility]  
  
  
  
Payers  
  
  
                          Date         Payer Category Payer        Policy ID  
   
                          2021   Unknown                   [id number]   
[id number]  
   
                          1999   Unknown                   [id number] [id number]  
[id number]  
   
                          1999   Unknown                   [id number] [id number]  
[id number]  
   
                          1999   Unknown                   [id number] [id number]  
[id number]  
   
                          1999   Unknown                   [id number] [id number]  
[id number]  
   
                          1999   Unknown                   [id number] [id number]  
[id number]  
   
                          1999   Unknown                   [id number] [id number]  
[id number]  
   
                          1999   Unknown                   [id number] [id number].  
[id number]  
   
                          1999   Unknown                   [id number] [id number]  
[id number]  
   
                          1999   Unknown                   [id number] 2.16.8  
[id number]  
   
                          1960   Private Health Insurance              [id number]  
   
                                       Unknown                   [id number]  
  
  
  
Social History  
  
  
                          Date         Type         Detail       Facility  
   
                                       Assertion    Family problems (finding) He  
[facility]  
   
                                                Assertion       Problem situatio  
n   
relating to social and   
personal history   
(finding)                               [facility]  
   
                                                Assertion       Emotional stress  
   
(finding)                               [facility]  
   
                                                Assertion       Lives with parobed  
ts   
(finding)                               [facility]  
   
                                       Assertion    Social drinker (finding) [facility]  
[facility]  
   
                                                Assertion       Benzodiazepine m  
isuse   
(finding)                               [facility]  
   
                                       Assertion    Sexually active (finding) He  
[facility]  
   
                                       Assertion                 [facility]   
[facility]  
   
                                                Assertion       Gender identity   
finding   
(finding)                               [facility]  
   
                                                Assertion       Finding of sexua  
l   
orientation (finding)                   [facility]  
   
                                                            Tobacco smoking   
status                    Unknown if ever smoked    [facility]   
[facility]  
Work Phone:   
[phone]  
   
                                                    Start: 10-  
End: 2021                         Tobacco smoking   
status NHIS               Never smoker              [employer] Phone:   
[phone]  
   
                                                    Start: 10-  
End: 2021                         Tobacco use and   
exposure                  Never used                [facility]  
   
                                                    Start: 2021  
End: 2021     Alcohol intake      Ex-drinker (finding) [employer] Phone:   
[phone]  
   
                                        Start: 2021   History SDOH Alcohol  
   
Frequency                 1                         [employer] Phone:   
[phone]  
   
                                        Start: 1999   Sex Assigned At   
Birth                     Not on file               [employer] Phone:   
[phone]  
   
                                                            Exposure to   
SARS-CoV-2 (event)        Not sure                  [employer]       Smoking monitori  
ng status   
(finding)                               [facility]  
Work Phone:   
[phone]  
   
                                                Assertion       Cigarette smoker  
   
(finding)                               [facility]  
   
                                                [facility]       Light cigarette   
smoker   
(1-9 cigs/day) (finding)                [facility]  
   
                                                Assertion       Exposure to poll  
ution   
(event)                                 [facility]  
   
                                       Assertion    Smoker (finding) [facility]  
   
                                                Assertion       Finding relating  
 to   
sexual activity (finding)               [facility]  
   
                                                Assertion       Homosexual behav  
ior   
(finding)                               [facility]  
   
                                                Assertion       Currently not se  
xually   
active (finding)                        [facility]  
   
                                                Assertion       Details of drug   
misuse   
behavior (observable   
entity)                                 [facility]  
   
                                                Assertion       History of disor  
danny   
(situation)                             [facility]  
   
                                                    NEGATED: Highlighted   
row                       Assertion                 Current drinker of   
alcohol (finding)                       [facility]  
   
                                                    NEGATED: Highlighted   
row                       Assertion                 Finding relating to drug   
misuse behavior (finding)               [facility]   
[place]  
   
                                                    NEGATED: Highlighted   
row                 Assertion                               [facility]  
   
                                                    NEGATED: Highlighted   
row                       Assertion                 Exposure to pollution   
(event)                                 [facility]  
  
  
  
Medical Equipment  
  
  
                                Procedure Code  Equipment Code  Equipment Origin  
al   
Text                                    Equipment   
Identifier                              Dates  
   
                                                                Blood Glucose Te  
st In   
Vitro Strip               [id number]                  Start:   
2024  
End:   
2024  
   
                                                                [employer]  
   
Miscellaneous             [id number]                  Start:   
2024  
   
                                                                OneTouch Ultra I  
n   
Vitro Strip               [id number]                  Start:   
2024  
End:   
2025  
  
  
  
Mental Status  
  
  
                          Date         Assessment   Result       Facility  
   
                                2020      Cognitive function A previous haider  
icide attempt    
with hospitalization at [address] [place] [address]  
Work Phone:   
[phone]  
   
                                                Cognitive function Cognitive fun  
ctioning was   
normal Cognitive function   
finding (finding)                       [facility]  
Work Phone:   
[phone]  
  
  
  
Clinical Notes 2021 to 10-  
  
  
                                Note Date & Type Note            Facility  
  
  
  
                                        10- Evaluation note   
  
  
Includes: Assessments for all patient encounters  
  
  
  
                                                    Findings  
   
                                                    [D50.9 - Iron deficiency ane  
jennifer,   
unspecified] iron deficiency   
anemia                                  Chart Update with Doreen   
Deborah DIAS                              10/07/2024  
  
  
  
                                                    Last Documented On 10/09/202  
4 2:06PM ; [facility]  
  
  
  
                                        Attention-deficit hyperactivity disorder  
  Established Patient with Radha Young [facility]                               2024  
  
  
  
                                                    Last Documented On   
4 4:15PM ; [facility]  
  
  
  
                                                    Bipolar I disorder, most rec  
ent   
episode, depressed - mild                Established Patient with Radha Young [facility]                               2024  
  
  
  
                                                    Last Documented On   
4 4:15PM ; [facility]  
  
  
  
                                Nicotine dependence  Established Patient with   
Radha Young W 2024  
  
  
  
                                                    Last Documented On   
4 4:15PM ; [facility]  
  
  
  
                                        Post-traumatic stress disorder  Establ  
ished Patient with Radha Young   
[facility]                                     2024  
  
  
  
                                                    Last Documented On   
4 4:15PM ; [facility]  
  
  
  
                                                    [Z68.43 - Body mass index [B  
MI]   
50.0-59.9, adult] assessment of body   
mass index                              Medical Established Patient with   
Doreen Cabrera LARISSA                        2024  
  
  
  
                                                    Last Documented On 10/09/202  
4 2:09PM ; [facility]  
  
  
  
                                                    Bipolar I disorder, most rec  
ent   
episode, depressed - mild               Medical Established Patient with Doreen   
Deborah CNP                              2024  
  
  
  
                                                    Last Documented On 10/09/202  
4 2:09PM ; [facility]  
  
  
  
                                Caries          Medical Established Patient with  
 Doreen Deborah CNP 2024  
  
  
  
                                                    Last Documented On 10/09/202  
4 2:09PM ; [facility]  
  
  
  
                                        Encounter for Immunization Medical Estab  
lished Patient with Doreen Deborah   
CNP                                     2024  
  
  
  
                                                    Last Documented On 10/09/202  
4 2:09PM ; [facility]  
  
  
  
                                                    Type 2 diabetes mellitus wit  
hout   
complication                            Medical Established Patient with   
Doreen Deborah CNP                        2024  
  
  
  
                                                    Last Documented On 10/09/202  
4 2:09PM ; [facility]  
  
  
  
                                        Attention-deficit hyperactivity disorder  
  Established Patient with   
Leonie Short [facility]                     2024  
  
  
  
                                                    Last Documented On   
4 3:28PM ; [facility]  
  
  
  
                                                    Bipolar I disorder, most rec  
ent   
episode, depressed - mild               BH Established Patient with Leonie   
Short [facility]                             2024  
  
  
  
                                                    Last Documented On   
4 3:28PM ; [facility]  
  
  
  
                                        Post-traumatic stress disorder BH Establ  
ished Patient with Leonie Short   
[facility]                                   2024  
  
  
  
                                                    Last Documented On   
4 3:28PM ; [facility]  
  
  
  
                                                    [E11.9 - Type 2 diabetes lobito  
litus   
without complications] type 2 diabetes   
mellitus                                Medical Established Patient with   
Doreen Deborah CNP                        2024  
  
  
  
                                                    Last Documented On   
4 7:36PM ; [facility]  
  
  
  
                                                    [F41.1 - Generalized anxiety  
   
disorder] generalized anxiety   
disorder                                Medical Established Patient with   
Doreen Deborah CNP                        2024  
  
  
  
                                                    Last Documented On   
4 7:36PM ; [facility]  
  
  
  
                                                    [I10 - Essential (primary)   
hypertension] essential hypertension    Medical Established Patient with   
Doreen Deborah CNP                        2024  
  
  
  
                                                    Last Documented On   
4 7:36PM ; [facility]  
  
  
  
                                                    [N94.6 - Dysmenorrhea, unspe  
cified]   
dysmenorrhea                            Medical Established Patient with   
Doreen Deborah CNP                        2024  
  
  
  
                                                    Last Documented On   
4 7:36PM ; [facility]  
  
  
  
                                                    [Z68.43 - Body mass index [B  
MI]   
50.0-59.9, adult] assessment of body   
mass index                              Medical Established Patient with   
Doreen Cabrera CNP                        2024  
  
  
  
                                                    Last Documented On   
4 7:36PM ; [facility]  
  
  
  
                                        Encounter for Immunization Medical Estab  
lished Patient with Doreen Cabrera   
CNP                                     2024  
  
  
  
                                                    Last Documented On   
4 7:36PM ; [facility]  
  
  
  
                                        Venipuncture was performed Medical Estab  
lished Patient with Doreen Cabrera   
CNP                                     2024  
  
  
  
                                                    Last Documented On   
4 7:36PM ; [facility]  
  
  
  
                                        Attention-deficit hyperactivity disorder  
  Established Patient with   
Leonie Short LISWS                     2024  
  
  
  
                                                    Last Documented On   
4 10:10AM ; [facility]  
  
  
  
                                                    Bipolar I disorder, most rec  
ent   
episode, depressed - mild                Established Patient with Leonie   
Short LISWS                             2024  
  
  
  
                                                    Last Documented On   
4 10:10AM ; [facility]  
  
  
  
                                        Post-traumatic stress disorder  Establ  
ished Patient with Leonie Short   
LISWS                                   2024  
  
  
  
                                                    Last Documented On   
4 10:10AM ; [facility]  
  
  
  
                                        [D64.9 - Anemia, unspecified] anemia Med  
ical Established Patient with   
Doreen Cabrera CNP                        2024  
  
  
  
                                                    Last Documented On   
4 6:51PM ; [facility]  
  
  
  
                                                    [Z68.43 - Body mass index [B  
MI]   
50.0-59.9, adult] assessment of body   
mass index                              Medical Established Patient with   
Doreen Cabrera CNP                        2024  
  
  
  
                                                    Last Documented On   
4 6:51PM ; [facility]  
  
  
  
                                                    Bipolar affective disorder,   
current   
episode depressed, mild                  Established Patient with Leonie   
Short LISWS                             2024  
  
  
  
                                                    Last Documented On   
4 5:16PM ; [facility]  
  
  
  
                                        Post-traumatic stress disorder  Establ  
ished Patient with Leonie Short   
LISWS                                   2024  
  
  
  
                                                    Last Documented On   
4 5:16PM ; [facility]  
  
  
  
                                                    Undifferentiated attention d  
eficit   
disorder                                 Established Patient with   
Leonie Short LISWS                     2024  
  
  
  
                                                    Last Documented On   
4 5:16PM ; [facility]  
  
  
  
                                        Visit for: screening for disorder BH Est  
ablished Patient with Leonie   
Short LISWS                             2024  
  
  
  
                                                    Last Documented On   
4 5:16PM ; [facility]  
  
  
  
                                                    [Z68.43 - Body mass index [B  
MI]   
50.0-59.9, adult] assessment of body   
mass index                              Medical Established Patient with   
Doreen Cabrera CNP                        2024  
  
  
  
                                                    Last Documented On   
4 7:50PM ; [facility]  
  
  
  
                                        Attention-deficit hyperactivity disorder  
 Medical Established Patient with   
Doreen Cabrera CNP                        2024  
  
  
  
                                                    Last Documented On   
4 7:50PM ; [facility]  
  
  
  
                                                    Diabetes Risk Test Score was  
 three   
score 3/27/2024                         Medical Established Patient with Doreen Cabrera CNP                              2024  
  
  
  
                                                    Last Documented On   
4 7:50PM ; [facility]  
  
  
  
                                        Visit for: screening for STD Medical Est  
ablished Patient with Doreen Cabrera   
CNP                                     2024  
  
  
  
                                                    Last Documented On   
4 7:50PM ; [facility]  
  
  
  
                                                    [Z68.32 - Body mass index [B  
MI]   
32.0-32.9, adult] assessment of body   
mass index                              Medical Established Patient with   
Doreen Cabrera CNP                        2023  
  
  
  
                                                    Last Documented On   
3 6:01PM ; [facility]  
  
  
  
                                        Borderline personality disorder Medical   
Established Patient with Doreen Cabrrea CNP                              2023  
  
  
  
                                                    Last Documented On   
3 6:01PM ; [facility]  
  
  
  
                                        Screening for diabetes mellitus Medical   
Established Patient with Doreen Cabrera CNP                              2023  
  
  
  
                                                    Last Documented On   
3 6:01PM ; [facility]  
  
  
  
                                        Assessment of body mass index Medical Es  
tablished Patient with Doreen Cabrera CNP                              10/21/2022  
  
  
  
                                                    Last Documented On 10/21/202  
2 2:45PM ; [facility]  
  
  
  
                                                    Bipolar affective disorder,   
current   
episode manic                            [facility]                        [facility]  
  
  
  
                                                    Last Documented On   
2 3:22PM ; [facility]  
  
  
  
                                                    Bipolar affective disorder,   
current   
episode manic                           BH Establ[facility]                        [facility]  
  
  
  
                                                    Last Documented On   
2 2:28PM ; [facility]  
  
  
  
                                                    Bipolar affective disorder,   
current   
episode depressed, severe with   
psychosis                                Tele[facility]al Health with Haylienicanor Aguilar [facility]                        2022  
  
  
  
                                                    Last Documented On   
2 3:29PM ; [facility]  
  
  
  
                                                    Assessment of body mass inde  
x [Body   
mass index [BMI] 50.0-59.9, adult]      [facility] - Established with Julieth   
Aliya CNP                               2022  
  
  
  
                                                    Last Documented On   
2 9:36AM ; [facility]  
  
  
  
                                                    Bipolar I disorder, most rec  
ent   
episode, manic                          [facility] - Established with Julieth   
Aliya CNP                               2022  
  
  
  
                                                    Last Documented On   
2 9:36AM ; [facility]  
  
  
  
                                        Post-traumatic stress disorder  Establ  
ished Patient with Haylienicanor Aguilar   
[facility]                                  2022  
  
  
  
                                                    Last Documented On   
2 3:20PM ; [facility]  
  
  
  
                                No cough        Medical Established Patient with  
 Doreen Deborah CNP 2022  
  
  
  
                                                    Last Documented On   
2 4:04PM ; [facility]  
  
  
  
                                                    Z68.43 - Body mass index [BM  
I]   
50.0-59.9, adult                        Medical Established Patient with   
Doreen Deborah CNP                        2022  
  
  
  
                                                    Last Documented On   
2 4:04PM ; [facility]  
  
  
  
                                                    Borderline personality disor  
danny Pt   
reported hx of sx/dx                     Established Patient with Haylienicanor Aguilar [facility]                        2022  
  
  
  
                                                    Last Documented On   
2 4:08PM ; [facility]  
  
  
  
                                                    Assessment of body mass inde  
x [Body   
mass index [BMI] 50.0-59.9, adult]      [facility] - Established with Julieth   
Aliya CNP                               2022  
  
  
  
                                                    Last Documented On   
2 7:41PM ; [facility]  
  
  
  
                                                    Diabetes Risk Test Score was  
 three   
score 2022                         Open [facility] - Established with Julieth   
Aliya CNP                               2022  
  
  
  
                                                    Last Documented On   
2 7:41PM ; [facility]  
  
  
  
                                                    Bipolar I disorder, most rec  
ent   
episode, manic                           Established Patient with Avis Felder [facility]                          2021  
  
  
  
                                                    Last Documented On   
1 1:35AM ; [facility]  
  
  
  
                                        Borderline personality disorder  Estab  
lished Patient with Avis   
Felder [facility]                          2021  
  
  
  
                                                    Last Documented On   
1 1:35AM ; [facility]  
  
  
  
                                        Post-traumatic stress disorder  Establ  
ished Patient with Avis   
Felder LP[facility]                          2021  
  
  
  
                                                    Last Documented On   
1 1:35AM ; [facility]  
  
  
  
                                                    Assessment of visit for: bhargavi myles for   
human immunodeficiency virus            Medical Established Patient with   
Doreen Deborah CNP                        2021  
  
  
  
                                                    Last Documented On   
1 2:56PM ; [facility]  
  
  
  
                                Nicotine dependence Medical Established Patient   
with Doreen Deborah CNP 2021  
  
  
  
                                                    Last Documented On   
1 2:56PM ; [facility]  
  
  
  
                                Tachycardia     Medical Established Patient with  
 Doreen Deborah CNP 2021  
  
  
  
                                                    Last Documented On   
1 2:56PM ; [facility]  
  
  
  
                                                    Z68.43 - Body mass index [BM  
I]   
50.0-59.9, adult                        Medical Established Patient with   
Doreen Deborah CNP                        2021  
  
  
  
                                                    Last Documented On   
1 2:56PM ; [facility]  
  
  
  
                                                    Bipolar I disorder, most rec  
ent   
episode, manic                           Established Patient with Leonie   
Short LISWS                             2021  
  
  
  
                                                    Last Documented On   
1 10:17AM ; [facility]  
  
  
  
                                                    Borderline personality disor  
danny per   
patient reported history                 Established Patient with Leonie   
Short LISWS                             2021  
  
  
  
                                                    Last Documented On   
1 10:17AM ; [facility]  
  
  
  
                                Nicotine dependence  Established Patient with   
Leonie Short LISWS 2021  
  
  
  
                                                    Last Documented On   
1 10:17AM ; [facility]  
  
  
  
                                        Post-traumatic stress disorder  Establ  
ished Patient with Leonie Short   
LISWS                                   2021  
  
  
  
                                                    Last Documented On   
1 10:17AM ; [facility]  
  
  
  
                                Body mass index Medical Established Patient with  
 Doreen Deborah CNP 2021  
  
  
  
                                                    Last Documented On   
1 5:23PM ; [facility]  
  
  
  
                                Morbid obesity  Medical Established Patient with  
 Doreen Deborah CNP 2021  
  
  
  
                                                    Last Documented On   
1 5:23PM ; [facility]  
  
  
  
                                        Nicotine dependence uncomplicated Medica  
l Established Patient with Doreen   
Deborah CNP                              2021  
  
  
  
                                                    Last Documented On   
1 5:23PM ; [facility]  
  
  
  
                                                    Z68.42 - Body mass index [BM  
I]   
45.0-49.9, adult                        Medical Established Patient with   
Doreen Deborah CNP                        2021  
  
  
  
                                                    Last Documented On   
1 5:23PM ; [facility]  
  
  
  
                                Episodic mood disorders On Call with Pamela Velezammy edwards CNP 2021  
  
  
  
                                                    Last Documented On   
1 7:49AM ; [facility]  
  
  
  
                                                    Mood disorders, NOS per tabatha  
ent   
reported history                         Established Patient with Leonie   
Short LISWS                             2021  
  
  
  
                                                    Last Documented On   
1 7:15PM ; [facility]  
  
  
  
                                        Post-traumatic stress disorder  Establ  
ished Patient with Leonie Short   
LISWS                                   2021  
  
  
  
                                                    Last Documented On   
1 7:15PM ; [facility]  
  
  
  
                                Morbid obesity  Medical Established Patient with  
 Doreen Deborah CNP 2021  
  
  
  
                                                    Last Documented On   
1 12:31PM ; [facility]  
  
  
  
                                Otitis externa  Medical Established Patient with  
 Doreen Deborah CNP 2021  
  
  
  
                                                    Last Documented On   
1 12:31PM ; [facility]  
  
  
  
                                                    Z68.42 - Body mass index [BM  
I]   
45.0-49.9, adult                        Medical Established Patient with   
Doreen Deborah CNP                        2021  
  
  
  
                                                    Last Documented On   
1 12:31PM ; [facility]  
  
  
  
                                        Post-traumatic stress disorder  Establ  
ished Patient with Leonie Short   
LISWS                                   06/10/2021  
  
  
  
                                                    Last Documented On 06/10/202  
1 10:05PM ; [facility]  
  
  
  
                                Morbid obesity  Medical Established Patient with  
 Doreen Deborah CNP 06/10/2021  
  
  
  
                                                    Last Documented On 06/10/202  
1 4:45PM ; [facility]  
  
  
  
                                                    Z68.42 - Body mass index [BM  
I]   
45.0-49.9, adult                        Medical Established Patient with   
Doreen Deborah CNP                        06/10/2021  
  
  
  
                                                    Last Documented On 06/10/202  
1 4:45PM ; [facility]  
  
  
  
                                        Post-traumatic stress disorder  Establ  
ished Patient with Leonie Short   
LISWS                                   2021  
  
  
  
                                                    Last Documented On   
1 11:59AM ; [facility]  
  
  
  
                                                    Assessment of visit for: bhargavi myles for   
human immunodeficiency virus            Medical New Patient with Doreen Cabrera CNP                              2021  
  
  
  
                                                    Last Documented On   
1 4:06PM ; [facility]  
  
  
  
                                                    Diabetes Risk Test Score was  
 one score   
2021                               Medical New Patient with Doreen Cabrera CNP                              2021  
  
  
  
                                                    Last Documented On   
1 4:06PM ; [facility]  
  
  
  
                                Hypertension    Medical New Patient with Doreen esposito CNP 2021  
  
  
  
                                                    Last Documented On   
1 4:06PM ; [facility]  
  
  
  
                                Morbid obesity  Medical New Patient with Doreen esposito CNP 2021  
  
  
  
                                                    Last Documented On   
1 4:06PM ; [facility]  
  
  
  
                                Post-traumatic stress disorder Medical New Patie  
nt with Doreen Cabrera CNP   
2021  
  
  
  
                                                    Last Documented On   
1 4:06PM ; [facility]  
  
  
  
                                                    Z68.42 - Body mass index [BM  
I]   
45.0-49.9, adult                        Medical New Patient with Doreen Cabrera CNP                              2021  
  
  
  
                                                    Last Documented On   
1 4:06PM ; [facility]  
Work Phone: [phone]- Evaluation note  
  
Includes: Assessments for all patient encounters  
  
  
  
                                Findings        Encounter       Date  
   
                                                    [D50.9 - Iron deficiency ane  
jennifer,   
unspecified] iron deficiency anemia Chart Update with Doreen Cabrera CNP   
10/07/2024  
  
  
  
                                                    Last Documented On 10/09/202  
4 2:06PM ; [facility]  
  
  
  
                                        Attention-deficit hyperactivity disorder  
  Established Patient with Radha Young LSW                               2024  
  
  
  
                                                    Last Documented On   
4 4:15PM ; [facility]  
  
  
  
                                                    Bipolar I disorder, most rec  
ent   
episode, depressed - mild                Established Patient with Radha Young LSW                               2024  
  
  
  
                                                    Last Documented On   
4 4:15PM ; [facility]  
  
  
  
                                Nicotine dependence  Established Patient with   
Radha Young LSW 2024  
  
  
  
                                                    Last Documented On   
4 4:15PM ; [facility]  
  
  
  
                                        Post-traumatic stress disorder  Establ  
ished Patient with Radha Young   
LSW                                     2024  
  
  
  
                                                    Last Documented On   
4 4:15PM ; [facility]  
  
  
  
                                                    [Z68.43 - Body mass index [B  
MI]   
50.0-59.9, adult] assessment of body   
mass index                              Medical Established Patient with   
Doreenpaola Mccormacken CNP                        2024  
  
  
  
                                                    Last Documented On 10/04/202  
4 1:56PM ; [facility]  
  
  
  
                                                    Bipolar I disorder, most rec  
ent   
episode, depressed - mild               Medical Established Patient with Doreen   
Deborah CNP                              2024  
  
  
  
                                                    Last Documented On 10/04/202  
4 1:56PM ; [facility]  
  
  
  
                                Caries          Medical Established Patient with  
 Doreen Deborah CNP 2024  
  
  
  
                                                    Last Documented On 10/04/202  
4 1:56PM ; [facility]  
  
  
  
                                        Encounter for Immunization Medical Estab  
lished Patient with Doreen Deborah   
CNP                                     2024  
  
  
  
                                                    Last Documented On 10/04/202  
4 1:56PM ; [facility]  
  
  
  
                                                    Type 2 diabetes mellitus wit  
hout   
complication                            Medical Established Patient with   
Doreen Deborah CNP                        2024  
  
  
  
                                                    Last Documented On 10/04/202  
4 1:56PM ; [facility]  
  
  
  
                                        Attention-deficit hyperactivity disorder  
  Established Patient with   
Leonie Short LISWS                     2024  
  
  
  
                                                    Last Documented On   
4 3:28PM ; [facility]  
  
  
  
                                                    Bipolar I disorder, most rec  
ent   
episode, depressed - mild                Established Patient with Leonie   
Short LISWS                             2024  
  
  
  
                                                    Last Documented On   
4 3:28PM ; [facility]  
  
  
  
                                        Post-traumatic stress disorder  Establ  
ished Patient with Leonie Short   
LISWS                                   2024  
  
  
  
                                                    Last Documented On   
4 3:28PM ; [facility]  
  
  
  
                                                    [E11.9 - Type 2 diabetes lobito  
litus   
without complications] type 2 diabetes   
mellitus                                Medical Established Patient with   
Doreen Deborah CNP                        2024  
  
  
  
                                                    Last Documented On   
4 7:36PM ; [facility]  
  
  
  
                                                    [F41.1 - Generalized anxiety  
   
disorder] generalized anxiety   
disorder                                Medical Established Patient with   
Doreen Deborah CNP                        2024  
  
  
  
                                                    Last Documented On   
4 7:36PM ; [facility]  
  
  
  
                                                    [I10 - Essential (primary)   
hypertension] essential hypertension    Medical Established Patient with   
Doreen Cabrera CNP                        2024  
  
  
  
                                                    Last Documented On   
4 7:36PM ; [facility]  
  
  
  
                                                    [N94.6 - Dysmenorrhea, unspe  
cified]   
dysmenorrhea                            Medical Established Patient with   
Doreen Cabrera CNP                        2024  
  
  
  
                                                    Last Documented On   
4 7:36PM ; [facility]  
  
  
  
                                                    [Z68.43 - Body mass index [B  
MI]   
50.0-59.9, adult] assessment of body   
mass index                              Medical Established Patient with   
Doreen Cabrera CNP                        2024  
  
  
  
                                                    Last Documented On   
4 7:36PM ; [facility]  
  
  
  
                                        Encounter for Immunization Medical Estab  
lished Patient with Doreen Cabrera   
CNP                                     2024  
  
  
  
                                                    Last Documented On   
4 7:36PM ; [facility]  
  
  
  
                                        Venipuncture was performed Medical Estab  
lished Patient with Doreen Cabrera   
CNP                                     2024  
  
  
  
                                                    Last Documented On   
4 7:36PM ; [facility]  
  
  
  
                                        Attention-deficit hyperactivity disorder  
  Established Patient with   
Leonie Short LISWS                     2024  
  
  
  
                                                    Last Documented On   
4 10:10AM ; [facility]  
  
  
  
                                                    Bipolar I disorder, most rec  
ent   
episode, depressed - mild               BH Established Patient with Leonie   
Short LISWS                             2024  
  
  
  
                                                    Last Documented On   
4 10:10AM ; [facility]  
  
  
  
                                        Post-traumatic stress disorder  Establ  
ished Patient with Leonie Short   
LISWS                                   2024  
  
  
  
                                                    Last Documented On   
4 10:10AM ; [facility]  
  
  
  
                                        [D64.9 - Anemia, unspecified] anemia Med  
ical Established Patient with   
Doreen Cabrera CNP                        2024  
  
  
  
                                                    Last Documented On   
4 6:51PM ; [facility]  
  
  
  
                                                    [Z68.43 - Body mass index [B  
MI]   
50.0-59.9, adult] assessment of body   
mass index                              Medical Established Patient with   
Doreen Cabrera CNP                        2024  
  
  
  
                                                    Last Documented On   
4 6:51PM ; [facility]  
  
  
  
                                                    Bipolar affective disorder,   
current   
episode depressed, mild                  Established Patient with Leonie   
Short LISWS                             2024  
  
  
  
                                                    Last Documented On   
4 5:16PM ; [facility]  
  
  
  
                                        Post-traumatic stress disorder BH Establ  
ished Patient with Leonie Short   
LISWS                                   2024  
  
  
  
                                                    Last Documented On   
4 5:16PM ; [facility]  
  
  
  
                                                    Undifferentiated attention d  
eficit   
disorder                                 Established Patient with   
Leonie Short LISWS                     2024  
  
  
  
                                                    Last Documented On   
4 5:16PM ; [facility]  
  
  
  
                                        Visit for: screening for disorder BH Est  
ablished Patient with Leonie   
Short LISWS                             2024  
  
  
  
                                                    Last Documented On   
4 5:16PM ; [facility]  
  
  
  
                                                    [Z68.43 - Body mass index [B  
MI]   
50.0-59.9, adult] assessment of body   
mass index                              Medical Established Patient with   
Doreen Cabrera CNP                        2024  
  
  
  
                                                    Last Documented On   
4 7:50PM ; [facility]  
  
  
  
                                        Attention-deficit hyperactivity disorder  
 Medical Established Patient with   
Doreenpaola Cabrera CNP                        2024  
  
  
  
                                                    Last Documented On   
4 7:50PM ; [facility]  
  
  
  
                                                    Diabetes Risk Test Score was  
 three   
score 3/27/2024                         Medical Established Patient with Doreenpaola Mccormacken CNP                              2024  
  
  
  
                                                    Last Documented On   
4 7:50PM ; [facility]  
  
  
  
                                        Visit for: screening for STD Medical Est  
ablished Patient with Doreenpaola Cabrera   
CNP                                     2024  
  
  
  
                                                    Last Documented On   
4 7:50PM ; [facility]  
  
  
  
                                                    [Z68.32 - Body mass index [B  
MI]   
32.0-32.9, adult] assessment of body   
mass index                              Medical Established Patient with   
Doreenpaola Cabrera CNP                        2023  
  
  
  
                                                    Last Documented On   
3 6:01PM ; [facility]  
  
  
  
                                        Borderline personality disorder Medical   
Established Patient with Doreen   
Deborah CNP                              2023  
  
  
  
                                                    Last Documented On   
3 6:01PM ; [facility]  
  
  
  
                                        Screening for diabetes mellitus Medical   
Established Patient with Doreen   
Deborah CNP                              2023  
  
  
  
                                                    Last Documented On   
3 6:01PM ; [facility]  
  
  
  
                                        Assessment of body mass index Medical Es  
tablished Patient with Doreenpaola Mccormacken CNP                              10/21/2022  
  
  
  
                                                    Last Documented On 10/21/202  
2 2:45PM ; [facility]  
  
  
  
                                                    Bipolar affective disorder,   
current   
episode manic                            [facility] with Haylienicanor Aguilar LPCC-S                        2022  
  
  
  
                                                    Last Documented On   
2 3:22PM ; [facility]  
  
  
  
                                                    Bipolar affective disorder,   
current   
episode manic                            Established Patient with Haylienicanor Aguilar LPCC-S                        2022  
  
  
  
                                                    Last Documented On   
2 2:28PM ; [facility]  
  
  
  
                                                    Bipolar affective disorder,   
current   
episode depressed, severe with   
psychosis                                Tele[facility] with Haylienicanor Aguilar LPCC-S                        2022  
  
  
  
                                                    Last Documented On   
2 3:29PM ; [facility]  
  
  
  
                                                    Assessment of body mass inde  
x [Body   
mass index [BMI] 50.0-59.9, adult]      Open [facility] - Established with Julieth   
Aliya CNP                               2022  
  
  
  
                                                    Last Documented On   
2 9:36AM ; [facility]  
  
  
  
                                                    Bipolar I disorder, most rec  
ent   
episode, manic                          Open Access - Established with Julieth   
Aliya CNP                               2022  
  
  
  
                                                    Last Documented On   
2 9:36AM ; [facility]  
  
  
  
                                        Post-traumatic stress disorder  Establ  
ished Patient with Haylienicanor Aguilar   
LPCC-S                                  2022  
  
  
  
                                                    Last Documented On   
2 3:20PM ; [facility]  
  
  
  
                                No cough        Medical Established Patient with  
 Doreen Deborah CNP 2022  
  
  
  
                                                    Last Documented On   
2 4:04PM ; [facility]  
  
  
  
                                                    Z68.43 - Body mass index [BM  
I]   
50.0-59.9, adult                        Medical Established Patient with   
Doreen Deborah CNP                        2022  
  
  
  
                                                    Last Documented On   
2 4:04PM ; [facility]  
  
  
  
                                                    Borderline personality disor  
danny Pt   
reported hx of sx/dx                     Established Patient with Haylienicanor Aguilar LPCC-S                        2022  
  
  
  
                                                    Last Documented On   
2 4:08PM ; [facility]  
  
  
  
                                                    Assessment of body mass inde  
x [Body   
mass index [BMI] 50.0-59.9, adult]      Open Access - Established with Julieth   
Aliya CNP                               2022  
  
  
  
                                                    Last Documented On   
2 7:41PM ; [facility]  
  
  
  
                                                    Diabetes Risk Test Score was  
 three   
score 2022                         Open Access - Established with Julieth   
Aliya CNP                               2022  
  
  
  
                                                    Last Documented On   
2 7:41PM ; [facility]  
  
  
  
                                                    Bipolar I disorder, most rec  
ent   
episode, manic                           Established Patient with Avis   
Felder LPCC-S                          2021  
  
  
  
                                                    Last Documented On   
1 1:35AM ; [facility]  
  
  
  
                                        Borderline personality disorder  Estab  
lished Patient with Avis   
Felder LPCC-S                          2021  
  
  
  
                                                    Last Documented On   
1 1:35AM ; [facility]  
  
  
  
                                        Post-traumatic stress disorder  Establ  
ished Patient with Avis   
Felder LPCC-S                          2021  
  
  
  
                                                    Last Documented On   
1 1:35AM ; [facility]  
  
  
  
                                                    Assessment of visit for: bhargavi myles for   
human immunodeficiency virus            Medical Established Patient with   
Doreen Deborah CNP                        2021  
  
  
  
                                                    Last Documented On   
1 2:56PM ; [facility]  
  
  
  
                                Nicotine dependence Medical Established Patient   
with Doreen Deborah CNP 2021  
  
  
  
                                                    Last Documented On   
1 2:56PM ; [facility]  
  
  
  
                                Tachycardia     Medical Established Patient with  
 Doreen Deborah CNP 2021  
  
  
  
                                                    Last Documented On   
1 2:56PM ; [facility]  
  
  
  
                                                    Z68.43 - Body mass index [BM  
I]   
50.0-59.9, adult                        Medical Established Patient with   
Doreen Deborah CNP                        2021  
  
  
  
                                                    Last Documented On   
1 2:56PM ; [facility]  
  
  
  
                                                    Bipolar I disorder, most rec  
ent   
episode, manic                           Established Patient with Leonie   
Short LISWS                             2021  
  
  
  
                                                    Last Documented On   
1 10:17AM ; [facility]  
  
  
  
                                                    Borderline personality disor  
danny per   
patient reported history                 Established Patient with Leonie   
Short LISWS                             2021  
  
  
  
                                                    Last Documented On   
1 10:17AM ; [facility]  
  
  
  
                                Nicotine dependence  Established Patient with   
Leonie Short LISWS 2021  
  
  
  
                                                    Last Documented On   
1 10:17AM ; [facility]  
  
  
  
                                        Post-traumatic stress disorder  Establ  
ished Patient with Leonie Short   
LISWS                                   2021  
  
  
  
                                                    Last Documented On   
1 10:17AM ; [facility]  
  
  
  
                                Body mass index Medical Established Patient with  
 Doreen Deborah CNP 2021  
  
  
  
                                                    Last Documented On   
1 5:23PM ; [facility]  
  
  
  
                                Morbid obesity  Medical Established Patient with  
 Doreen Deborah CNP 2021  
  
  
  
                                                    Last Documented On   
1 5:23PM ; [facility]  
  
  
  
                                        Nicotine dependence uncomplicated Medica  
l Established Patient with Doreen   
Deborah CNP                              2021  
  
  
  
                                                    Last Documented On   
1 5:23PM ; [facility]  
  
  
  
                                                    Z68.42 - Body mass index [BM  
I]   
45.0-49.9, adult                        Medical Established Patient with   
Doreen Deborah CNP                        2021  
  
  
  
                                                    Last Documented On   
1 5:23PM ; [facility]  
  
  
  
                                Episodic mood disorders On Call with Pamela edwards CNP 2021  
  
  
  
                                                    Last Documented On   
1 7:49AM ; [facility]  
  
  
  
                                                    Mood disorders, NOS per tabatha  
ent   
reported history                         Established Patient with Leonie   
Short LISWS                             2021  
  
  
  
                                                    Last Documented On   
1 7:15PM ; [facility]  
  
  
  
                                        Post-traumatic stress disorder  Establ  
ished Patient with Leonie Short   
LISWS                                   2021  
  
  
  
                                                    Last Documented On   
1 7:15PM ; [facility]  
  
  
  
                                Morbid obesity  Medical Established Patient with  
 Doreen Deborah CNP 2021  
  
  
  
                                                    Last Documented On   
1 12:31PM ; [facility]  
  
  
  
                                Otitis externa  Medical Established Patient with  
 Doreen Deborah CNP 2021  
  
  
  
                                                    Last Documented On   
1 12:31PM ; [facility]  
  
  
  
                                                    Z68.42 - Body mass index [BM  
I]   
45.0-49.9, adult                        Medical Established Patient with   
Doreen Deborah CNP                        2021  
  
  
  
                                                    Last Documented On   
1 12:31PM ; [facility]  
  
  
  
                                        Post-traumatic stress disorder  Establ  
ished Patient with Leonie Short   
LISWS                                   06/10/2021  
  
  
  
                                                    Last Documented On 06/10/202  
1 10:05PM ; [facility]  
  
  
  
                                Morbid obesity  Medical Established Patient with  
 Doreen Cabrera CNP 06/10/2021  
  
  
  
                                                    Last Documented On 06/10/202  
1 4:45PM ; [facility]  
  
  
  
                                                    Z68.42 - Body mass index [BM  
I]   
45.0-49.9, adult                        Medical Established Patient with   
Doreenpaola Cabrera CNP                        06/10/2021  
  
  
  
                                                    Last Documented On 06/10/202  
1 4:45PM ; [facility]  
  
  
  
                                        Post-traumatic stress disorder BH Establ  
ished Patient with Leonie Short   
LISWS                                   2021  
  
  
  
                                                    Last Documented On   
1 11:59AM ; [facility]  
  
  
  
                                                    Assessment of visit for: bhargavi myles for   
human immunodeficiency virus            Medical New Patient with Doreen   
Deborah CNP                              2021  
  
  
  
                                                    Last Documented On   
1 4:06PM ; [facility]  
  
  
  
                                                    Diabetes Risk Test Score was  
 one score   
2021                               Medical New Patient with Doreen Cabrera CNP                              2021  
  
  
  
                                                    Last Documented On   
1 4:06PM ; [facility]  
  
  
  
                                Hypertension    Medical New Patient with Doreen DAVID esposito CNP 2021  
  
  
  
                                                    Last Documented On   
1 4:06PM ; [facility]  
  
  
  
                                Morbid obesity  Medical New Patient with Doreen DAVID almonteen CNP 2021  
  
  
  
                                                    Last Documented On   
1 4:06PM ; [facility]  
  
  
  
                                Post-traumatic stress disorder Medical New Patie  
nt with Doreenpaola Cabrera CNP   
2021  
  
  
  
                                                    Last Documented On   
1 4:06PM ; [facility]  
  
  
  
                                                    Z68.42 - Body mass index [BM  
I]   
45.0-49.9, adult                        Medical New Patient with Doreenpaola Cabrera CNP                              2021  
  
  
  
                                                    Last Documented On   
1 4:06PM ; [facility]  
Work Phone: [phone]- Progress note*   
  
Progress note  
  
  
  
                                Date            Encounter       Last Documented   
by  
   
                                10/07/2024      Chart Update    Last documented   
on 10/09/2024; 2:06 PM, Doreen Cabrera CNP;   
[facility]  
  
  
  
                                                      
  
  
** Active Problems & Conditions **  
- F90.9 - Attention-deficit Hyperactivity Disorder  
- F31.31 - Bipolar I Disorder, Most Recent Episode, Depressed Mild  
- E11.9 - Diabetes Mellitus Type 2 Without Complication  
- N94.6 - Dysmenorrhea  
- F43.10 - Post-traumatic Stress Disorder  
  
** Current Medication **  
- Alcohol Pads 70% use to cleanse skin prior to checking blood sugars, 90 days, 
3   
refills  
- ARIPiprazole 5 MG Oral Tablet take 1 tablet by mouth once daily, 30 days, 5 
refills  
- Atorvastatin Calcium 20 MG Oral Tablet take 1 tablet by mouth once daily at   
bedtime, 30 days, 5 refills  
- Blood Glucose System Srirma Kit use to check sugars once daily (use what is 
covered),   
30 days, 0 refills  
- busPIRone HCl 10 MG Oral Tablet take 1 tablet by mouth twice daily (d/c 5 mg 
rx),   
30 days, 5 refills  
- CareSens Lancets Miscellaneous use to check sugars once daily (dispense what 
is   
covered), 90 days, 3 refills  
- Colace 100 MG Oral Capsule take 1 capsule by mouth once daily at bedtime as 
needed   
for constipation, 30 days, 5 refills  
- [employer] Iron 325 (65 Fe) MG Oral Tablet take 1 tablet by mouth once daily, 30 
days, 5   
refills  
- Intuniv 1 MG Oral Tablet Extended Release 24 Hour take 1 tablet by mouth once 
daily   
at bedtime, 30 days, 5 refills  
- Januvia 50 MG Oral Tablet take 1 tablet by mouth once daily, 30 days, 5 
refills  
- lamoTRIgine 100 MG Oral Tablet take 1 tablet by mouth once daily, 30 days, 5   
refills  
- Lisinopril 5 MG Oral Tablet take 1 tablet by mouth once daily, 30 days, 5 
refills  
- MiraLax 17 GM/SCOOP Oral Powder mix 1 scoop with 8 ounces once daily, 30 days,
 2   
refills  
- Narcan 4 MG/0.1ML Nasal Liquid spray in nostril for symptoms of overdose , may
   
repeat in 2 minutes if symptoms persist, 1 days, 0 refills  
- OneTouch Ultra In Vitro Strip USE ONE TEST STRIP TO CHECK BLOOD SUGARS ONCE 
DAILY,   
90 days, 3 refills  
- Prazosin HCl 1 MG Oral Capsule take 1 capsule by mouth twice daily, 30 days, 5
   
refills  
- SEROquel 50 MG Oral Tablet take 1 tablet by mouth once daily at bedtime (d/c   
trazodone), 30 days, 1 refills  
- Sertraline HCl 50 MG Oral Tablet take 1 tablet by mouth once daily, 30 days, 5
   
refills  
- Slynd 4 MG Oral Tablet take 1 tablet by mouth at the same time everyday to 
help   
regulate your period, 28 days, 2 refills  
  
** Past Medical/Surgical History **  
Reported:  
No Safety Measures. Has sex without a condom.  
Medical: No previous hospitalizations. Chronic illness and Sexually transmitted   
infection Partners sexually transmitted infection status known.  
Immunization History: Recent immunization for flu.  
Exposure: Exposure to COVID-19.  
Pregnancy: Previously pregnant 1 time(s) and para having 0 live birth(s). Not   
planning a pregnancy in the next year.  
Legal Documents: Consent form on file for procedure.  
Diagnoses:  
Polycystic Ovarian Syndrome (PCOS).  
Migraine headache.  
Psychiatric disorders biopolar disorder  
Anxiety disorder  
POTS.  
Procedural:  
- Insertion of ear pressure equalization tubes in both ears  
Surgical:  
- Tonsillectomy  
- Tonsillectomy with adenoidectomy  
  
** Allergies **  
- Abilify Reaction: groggy  
- Augmentin Reaction: Shock  
- Metformin Reaction: Nausea, Vomiting  
- NO KNOWN ENVIRONMENTAL ALLERGIES  
- NO KNOWN FOOD ALLERGIES  
- Sertraline Reaction: slow/groggy  
- Trazodone Hydrochloride Reaction: Panic attack  
  
** Family History **  
Paternal:  
Systemic hypertension  
Oncologic disorder  
Maternal:  
Systemic hypertension  
Epilepsy and recurrent seizures  
Psychiatric disorders  
Oncologic disorder  
Fraternal:  
Psychiatric disorders  
  
** Assessment **  
- D50.9 - Iron deficiency anemia, unspecified  
  
** Plan **  
StartCited- Iron deficiency anemia, unspecified  
Lab: Iron and TIBC  
Lab: CBC With Differential/Platelet  
EndCited  
** Care Team **  
- Doreen Cabrera CNP  
  
  
[facility]- Progress note*   
  
Progress note  
  
  
  
                                Date            Encounter       Last Documented   
by  
   
                                10/01/2024      Chart Update    Last documented   
on 10/07/2024; 12:06 PM, Doreen Cabrera CNP;   
[facility]  
  
  
  
                                                      
  
  
** Active Problems & Conditions **  
- F90.9 - Attention-deficit Hyperactivity Disorder  
- F31.31 - Bipolar I Disorder, Most Recent Episode, Depressed Mild  
- E11.9 - Diabetes Mellitus Type 2 Without Complication  
- N94.6 - Dysmenorrhea  
- F43.10 - Post-traumatic Stress Disorder  
  
** Current Medication **  
- Alcohol Pads 70% use to cleanse skin prior to checking blood sugars, 90 days, 
3   
refills  
- ARIPiprazole 5 MG Oral Tablet take 1 tablet by mouth once daily, 30 days, 5 
refills  
- Atorvastatin Calcium 20 MG Oral Tablet take 1 tablet by mouth once daily at   
bedtime, 30 days, 5 refills  
- Blood Glucose System Sriram Kit use to check sugars once daily (use what is 
covered),   
30 days, 0 refills  
- busPIRone HCl 10 MG Oral Tablet take 1 tablet by mouth twice daily (d/c 5 mg 
rx),   
30 days, 5 refills  
- CareSens Lancets Miscellaneous use to check sugars once daily (dispense what 
is   
covered), 90 days, 3 refills  
- Colace 100 MG Oral Capsule take 1 capsule by mouth once daily at bedtime as 
needed   
for constipation, 30 days, 5 refills  
- [facility] Iron 325 (65 Fe) MG Oral Tablet take 1 tablet by mouth once daily, 30 
days, 5   
refills  
- Intuniv 1 MG Oral Tablet Extended Release 24 Hour take 1 tablet by mouth once 
daily   
at bedtime, 30 days, 5 refills  
- Januvia 50 MG Oral Tablet take 1 tablet by mouth once daily, 30 days, 5 
refills  
- lamoTRIgine 100 MG Oral Tablet take 1 tablet by mouth once daily, 30 days, 5   
refills  
- Lisinopril 5 MG Oral Tablet take 1 tablet by mouth once daily, 30 days, 5 
refills  
- MiraLax 17 GM/SCOOP Oral Powder mix 1 scoop with 8 ounces once daily, 30 days,
 2   
refills  
- Narcan 4 MG/0.1ML Nasal Liquid spray in nostril for symptoms of overdose , may
   
repeat in 2 minutes if symptoms persist, 1 days, 0 refills  
- OneTouch Ultra In Vitro Strip USE ONE TEST STRIP TO CHECK BLOOD SUGARS ONCE 
DAILY,   
90 days, 3 refills  
- Prazosin HCl 1 MG Oral Capsule take 1 capsule by mouth twice daily, 30 days, 5
   
refills  
- SEROquel 50 MG Oral Tablet take 1 tablet by mouth once daily at bedtime (d/c   
trazodone), 30 days, 1 refills  
- Sertraline HCl 50 MG Oral Tablet take 1 tablet by mouth once daily, 30 days, 5
   
refills  
- Slynd 4 MG Oral Tablet take 1 tablet by mouth at the same time everyday to 
help   
regulate your period, 28 days, 2 refills  
  
** Past Medical/Surgical History **  
Reported:  
No Safety Measures. Has sex without a condom.  
Medical: No previous hospitalizations. Chronic illness and Sexually transmitted   
infection Partners sexually transmitted infection status known.  
Immunization History: Recent immunization for flu.  
Exposure: Exposure to COVID-19.  
Pregnancy: Previously pregnant 1 time(s) and para having 0 live birth(s). Not   
planning a pregnancy in the next year.  
Legal Documents: Consent form on file for procedure.  
Diagnoses:  
Polycystic Ovarian Syndrome (PCOS).  
Migraine headache.  
Psychiatric disorders biopolar disorder  
Anxiety disorder  
POTS.  
Procedural:  
- Insertion of ear pressure equalization tubes in both ears  
Surgical:  
- Tonsillectomy  
- Tonsillectomy with adenoidectomy  
  
** Allergies **  
- Abilify Reaction: groggy  
- Augmentin Reaction: Shock  
- Metformin Reaction: Nausea, Vomiting  
- NO KNOWN ENVIRONMENTAL ALLERGIES  
- NO KNOWN FOOD ALLERGIES  
- Sertraline Reaction: slow/groggy  
- Trazodone Hydrochloride Reaction: Panic attack  
  
** Family History **  
Paternal:  
Systemic hypertension  
Oncologic disorder  
Maternal:  
Systemic hypertension  
Epilepsy and recurrent seizures  
Psychiatric disorders  
Oncologic disorder  
Fraternal:  
Psychiatric disorders  
  
** Care Team **  
- Doreen Cabrera CNP  
  
  
[facility]- History general Narrative - Reported  
  
Includes: Medical History in patient's chart  
  
  
  
                                        Description         Last Updated  
   
                                        No Safety Measures  2024  
  
  
  
                                                    Last Documented On   
4 4:15PM ; [facility]  
  
  
  
                                        POTS                2024  
  
  
  
                                                    Last Documented On   
4 6:51PM ; [facility]  
  
  
  
                                        0 previous live birth(s) 2024  
  
  
  
                                                    Last Documented On   
4 7:50PM ; [facility]  
  
  
  
                                        Previously pregnant 1 time(s) 2024  
  
  
  
                                                    Last Documented On   
4 7:50PM ; [facility]  
  
  
  
                                        Recent immunization for flu 2024  
  
  
  
                                                    Last Documented On   
4 7:50PM ; [facility]  
  
  
  
                                        Has sex without a condom 2022  
  
  
  
                                                    Last Documented On   
2 4:04PM ; [facility]  
  
  
  
                                        Not planning to have a baby in the next   
12 months 2022  
  
  
  
                                                    Last Documented On   
2 4:04PM ; [facility]  
  
  
  
                                        Partners sexually transmitted infection   
status known 2022  
  
  
  
                                                    Last Documented On   
2 4:04PM ; [facility]  
  
  
  
                                        No previous hospitalizations 2022  
  
  
  
                                                    Last Documented On   
2 4:04PM ; [facility]  
  
  
  
                                        Chronic illness     2021  
  
  
  
                                                    Last Documented On   
1 7:49AM ; [facility]  
  
  
  
                                        Exposure to COVID-19 2021  
  
  
  
                                                    Last Documented On   
1 12:31PM ; [facility]  
  
  
  
                                        History of gynecologic disorder 20  
21  
  
  
  
                                                    Last Documented On   
1 4:06PM ; [facility]  
  
  
  
                                        History of Polycystic Ovarian Syndrome (  
PCOS) 2021  
  
  
  
                                                    Last Documented On   
1 4:06PM ; [facility]  
  
  
  
                                        History of anxiety disorder NOS 20  
21  
  
  
  
                                                    Last Documented On   
1 4:06PM ; [facility]  
  
  
  
                                        History of migraine headache 2021  
  
  
  
                                                    Last Documented On   
1 4:06PM ; [facility]  
  
  
  
                                        History of psychiatric disorders biopola  
r disorder 2021  
  
  
  
                                                    Last Documented On   
1 4:06PM ; [facility]  
Work Phone: [phone]- History general Narrative - Reported  
  
Includes: Medical History in patient's chart  
  
  
  
                                        Description         Last Updated  
   
                                        No Safety Measures  2024  
  
  
  
                                                    Last Documented On   
4 4:15PM ; [facility]  
  
  
  
                                        Consent form on file for procedure   
  
  
  
                                                    Last Documented On 10/04/202  
4 1:56PM ; [facility]  
  
  
  
                                        POTS                2024  
  
  
  
                                                    Last Documented On   
4 6:51PM ; [facility]  
  
  
  
                                        0 previous live birth(s) 2024  
  
  
  
                                                    Last Documented On   
4 7:50PM ; [facility]  
  
  
  
                                        Previously pregnant 1 time(s) 2024  
  
  
  
                                                    Last Documented On   
4 7:50PM ; [facility]  
  
  
  
                                        Recent immunization for flu 2024  
  
  
  
                                                    Last Documented On   
4 7:50PM ; [facility]  
  
  
  
                                        Has sex without a condom 2022  
  
  
  
                                                    Last Documented On   
2 4:04PM ; [facility]  
  
  
  
                                        Not planning to have a baby in the next   
12 months 2022  
  
  
  
                                                    Last Documented On   
2 4:04PM ; [facility]  
  
  
  
                                        Partners sexually transmitted infection   
status known 2022  
  
  
  
                                                    Last Documented On   
2 4:04PM ; [facility]  
  
  
  
                                        No previous hospitalizations 2022  
  
  
  
                                                    Last Documented On   
2 4:04PM ; [facility]  
  
  
  
                                        Chronic illness     2021  
  
  
  
                                                    Last Documented On   
1 7:49AM ; [facility]  
  
  
  
                                        Exposure to COVID-19 2021  
  
  
  
                                                    Last Documented On   
1 12:31PM ; [facility]  
  
  
  
                                        History of gynecologic disorder 20  
21  
  
  
  
                                                    Last Documented On   
1 4:06PM ; [facility]  
  
  
  
                                        History of Polycystic Ovarian Syndrome (  
PCOS) 2021  
  
  
  
                                                    Last Documented On   
1 4:06PM ; [facility]  
  
  
  
                                        History of anxiety disorder NOS 20  
21  
  
  
  
                                                    Last Documented On   
1 4:06PM ; [facility]  
  
  
  
                                        History of migraine headache 2021  
  
  
  
                                                    Last Documented On   
1 4:06PM ; [facility]  
  
  
  
                                        History of psychiatric disorders biopola  
r disorder 2021  
  
  
  
                                                    Last Documented On   
1 4:06PM ; [facility]  
Work Phone: [phone]- History general Narrative - Reported  
  
Includes: Medical History in patient's chart  
  
  
  
                                        Description         Last Updated  
   
                                        No Safety Measures  2024  
  
  
  
                                                    Last Documented On   
4 4:15PM ; [facility]  
  
  
  
                                        Consent form on file for procedure   
  
  
  
                                                    Last Documented On 10/04/202  
4 1:56PM ; [facility]  
  
  
  
                                        POTS                2024  
  
  
  
                                                    Last Documented On   
4 6:51PM ; [facility]  
  
  
  
                                        0 previous live birth(s) 2024  
  
  
  
                                                    Last Documented On   
4 7:50PM ; [facility]  
  
  
  
                                        Previously pregnant 1 time(s) 2024  
  
  
  
                                                    Last Documented On   
4 7:50PM ; [facility]  
  
  
  
                                        Recent immunization for flu 2024  
  
  
  
                                                    Last Documented On   
4 7:50PM ; [facility]  
  
  
  
                                        Has sex without a condom 2022  
  
  
  
                                                    Last Documented On   
2 4:04PM ; [facility]  
  
  
  
                                        Not planning to have a baby in the next   
12 months 2022  
  
  
  
                                                    Last Documented On   
2 4:04PM ; [facility]  
  
  
  
                                        Partners sexually transmitted infection   
status known 2022  
  
  
  
                                                    Last Documented On   
2 4:04PM ; [facility]  
  
  
  
                                        No previous hospitalizations 2022  
  
  
  
                                                    Last Documented On   
2 4:04PM ; [facility]  
  
  
  
                                        Chronic illness     2021  
  
  
  
                                                    Last Documented On   
1 7:49AM ; [facility]  
  
  
  
                                        Exposure to COVID-19 2021  
  
  
  
                                                    Last Documented On   
1 12:31PM ; [facility]  
  
  
  
                                        History of gynecologic disorder 20  
21  
  
  
  
                                                    Last Documented On   
1 4:06PM ; [facility]  
  
  
  
                                        History of Polycystic Ovarian Syndrome (  
PCOS) 2021  
  
  
  
                                                    Last Documented On   
1 4:06PM ; [facility]  
  
  
  
                                        History of anxiety disorder NOS 20  
21  
  
  
  
                                                    Last Documented On   
1 4:06PM ; [facility]  
  
  
  
                                        History of migraine headache 2021  
  
  
  
                                                    Last Documented On   
1 4:06PM ; [facility]  
  
  
  
                                        History of psychiatric disorders biopola  
r disorder 2021  
  
  
  
                                                    Last Documented On   
1 4:06PM ; [facility]  
Work Phone: [phone]- History general Narrative - Reported  
  
Includes: Medical History in patient's chart  
  
  
  
                                        Description         Last Updated  
   
                                        No Safety Measures  2024  
  
  
  
                                                    Last Documented On   
4 4:15PM ; [facility]  
  
  
  
                                        Consent form on file for procedure   
  
  
  
                                                    Last Documented On 10/09/202  
4 2:09PM ; [facility]  
  
  
  
                                        POTS                2024  
  
  
  
                                                    Last Documented On   
4 6:51PM ; [facility]  
  
  
  
                                        0 previous live birth(s) 2024  
  
  
  
                                                    Last Documented On   
4 7:50PM ; [facility]  
  
  
  
                                        Previously pregnant 1 time(s) 2024  
  
  
  
                                                    Last Documented On   
4 7:50PM ; [facility]  
  
  
  
                                        Recent immunization for flu 2024  
  
  
  
                                                    Last Documented On   
4 7:50PM ; [facility]  
  
  
  
                                        Has sex without a condom 2022  
  
  
  
                                                    Last Documented On   
2 4:04PM ; [facility]  
  
  
  
                                        Not planning to have a baby in the next   
12 months 2022  
  
  
  
                                                    Last Documented On   
2 4:04PM ; [facility]  
  
  
  
                                        Partners sexually transmitted infection   
status known 2022  
  
  
  
                                                    Last Documented On   
2 4:04PM ; [facility]  
  
  
  
                                        No previous hospitalizations 2022  
  
  
  
                                                    Last Documented On   
2 4:04PM ; [facility]  
  
  
  
                                        Chronic illness     2021  
  
  
  
                                                    Last Documented On   
1 7:49AM ; [facility]  
  
  
  
                                        Exposure to COVID-19 2021  
  
  
  
                                                    Last Documented On   
1 12:31PM ; [facility]  
  
  
  
                                        History of gynecologic disorder 20  
21  
  
  
  
                                                    Last Documented On   
1 4:06PM ; [facility]  
  
  
  
                                        History of Polycystic Ovarian Syndrome (  
PCOS) 2021  
  
  
  
                                                    Last Documented On   
1 4:06PM ; [facility]  
  
  
  
                                        History of anxiety disorder NOS 20  
21  
  
  
  
                                                    Last Documented On   
1 4:06PM ; [facility]  
  
  
  
                                        History of migraine headache 2021  
  
  
  
                                                    Last Documented On   
1 4:06PM ; [facility]  
  
  
  
                                        History of psychiatric disorders biopola  
r disorder 2021  
  
  
  
                                                    Last Documented On   
1 4:06PM ; [facility]  
Work Phone: [phone]- History general Narrative - Reported  
  
Includes: Medical History in patient's chart  
  
  
  
                                        Description         Last Updated  
   
                                        No Safety Measures  2024  
  
  
  
                                                    Last Documented On   
4 4:15PM ; [facility]  
  
  
  
                                        Consent form on file for procedure 09/26  
/2024  
  
  
  
                                                    Last Documented On 10/04/202  
4 1:56PM ; [facility]  
  
  
  
                                        POTS                2024  
  
  
  
                                                    Last Documented On   
4 6:51PM ; [facility]  
  
  
  
                                        0 previous live birth(s) 2024  
  
  
  
                                                    Last Documented On   
4 7:50PM ; [facility]  
  
  
  
                                        Previously pregnant 1 time(s) 2024  
  
  
  
                                                    Last Documented On   
4 7:50PM ; [facility]  
  
  
  
                                        Recent immunization for flu 2024  
  
  
  
                                                    Last Documented On   
4 7:50PM ; [facility]  
  
  
  
                                        Has sex without a condom 2022  
  
  
  
                                                    Last Documented On   
2 4:04PM ; [facility]  
  
  
  
                                        Not planning to have a baby in the next   
12 months 2022  
  
  
  
                                                    Last Documented On   
2 4:04PM ; [facility]  
  
  
  
                                        Partners sexually transmitted infection   
status known 2022  
  
  
  
                                                    Last Documented On   
2 4:04PM ; [facility]  
  
  
  
                                        No previous hospitalizations 2022  
  
  
  
                                                    Last Documented On   
2 4:04PM ; [facility]  
  
  
  
                                        Chronic illness     2021  
  
  
  
                                                    Last Documented On   
1 7:49AM ; [facility]  
  
  
  
                                        Exposure to COVID-19 2021  
  
  
  
                                                    Last Documented On   
1 12:31PM ; [facility]  
  
  
  
                                        History of gynecologic disorder 20  
21  
  
  
  
                                                    Last Documented On   
1 4:06PM ; [facility]  
  
  
  
                                        History of Polycystic Ovarian Syndrome (  
PCOS) 2021  
  
  
  
                                                    Last Documented On   
1 4:06PM ; [facility]  
  
  
  
                                        History of anxiety disorder NOS 20  
21  
  
  
  
                                                    Last Documented On   
1 4:06PM ; [facility]  
  
  
  
                                        History of migraine headache 2021  
  
  
  
                                                    Last Documented On   
1 4:06PM ; [facility]  
  
  
  
                                        History of psychiatric disorders biopola  
r disorder 2021  
  
  
  
                                                    Last Documented On   
1 4:06PM ; [facility]  
Work Phone: [phone]- Evaluation note  
  
Includes: Assessments for all patient encounters  
  
  
  
                                Findings        Encounter       Date  
   
                                                    Attention-deficit hyperactiv  
ity   
disorder                                [facility] Established Patient with Radha Young LSW                               2024  
  
  
  
                                                    Last Documented On   
4 4:15PM ; [facility]  
  
  
  
                                                    Bipolar I disorder, most rec  
ent   
episode, depressed - mild               BH Established Patient with Radhaleslie Young LSW                               2024  
  
  
  
                                                    Last Documented On   
4 4:15PM ; [facility]  
  
  
  
                                Nicotine dependence  Established Patient with   
Radha Young LSW 2024  
  
  
  
                                                    Last Documented On   
4 4:15PM ; [facility]  
  
  
  
                                        Post-traumatic stress disorder  Establ  
ished Patient with Radha Young   
LSW                                     2024  
  
  
  
                                                    Last Documented On   
4 4:15PM ; [facility]  
  
  
  
                                                    [Z68.43 - Body mass index [B  
MI]   
50.0-59.9, adult] assessment of body   
mass index                              Medical Established Patient with   
Doreenpaola Mccormacken CNP                        2024  
  
  
  
                                                    Last Documented On   
4 5:46PM ; [facility]  
  
  
  
                                                    Bipolar I disorder, most rec  
ent   
episode, depressed - mild               Medical Established Patient with Doreen   
Deborah CNP                              2024  
  
  
  
                                                    Last Documented On   
4 5:46PM ; [facility]  
  
  
  
                                Caries          Medical Established Patient with  
 Doreen Deborah CNP 2024  
  
  
  
                                                    Last Documented On   
4 5:46PM ; [facility]  
  
  
  
                                        Encounter for Immunization Medical Estab  
lished Patient with Doreen Deborah   
CNP                                     2024  
  
  
  
                                                    Last Documented On   
4 5:46PM ; [facility]  
  
  
  
                                                    Type 2 diabetes mellitus wit  
hout   
complication                            Medical Established Patient with   
Doreen Deborah CNP                        2024  
  
  
  
                                                    Last Documented On   
4 5:46PM ; [facility]  
  
  
  
                                        Attention-deficit hyperactivity disorder  
  Established Patient with   
Leonie Short LISWS                     2024  
  
  
  
                                                    Last Documented On   
4 3:28PM ; [facility]  
  
  
  
                                                    Bipolar I disorder, most rec  
ent   
episode, depressed - mild                Established Patient with Leonie   
Short LISWS                             2024  
  
  
  
                                                    Last Documented On   
4 3:28PM ; [facility]  
  
  
  
                                        Post-traumatic stress disorder  Establ  
ished Patient with Leonie Short   
LISWS                                   2024  
  
  
  
                                                    Last Documented On   
4 3:28PM ; [facility]  
  
  
  
                                                    [E11.9 - Type 2 diabetes lobito  
litus   
without complications] type 2 diabetes   
mellitus                                Medical Established Patient with   
Doreen Cabrera CNP                        2024  
  
  
  
                                                    Last Documented On   
4 7:36PM ; [facility]  
  
  
  
                                                    [F41.1 - Generalized anxiety  
   
disorder] generalized anxiety   
disorder                                Medical Established Patient with   
Doreen Cabrera CNP                        2024  
  
  
  
                                                    Last Documented On   
4 7:36PM ; [facility]  
  
  
  
                                                    [I10 - Essential (primary)   
hypertension] essential hypertension    Medical Established Patient with   
Doreen Cabrera CNP                        2024  
  
  
  
                                                    Last Documented On   
4 7:36PM ; [facility]  
  
  
  
                                                    [N94.6 - Dysmenorrhea, unspe  
cified]   
dysmenorrhea                            Medical Established Patient with   
Doreen Cabrera CNP                        2024  
  
  
  
                                                    Last Documented On   
4 7:36PM ; [facility]  
  
  
  
                                                    [Z68.43 - Body mass index [B  
MI]   
50.0-59.9, adult] assessment of body   
mass index                              Medical Established Patient with   
Doreen Cabrera CNP                        2024  
  
  
  
                                                    Last Documented On   
4 7:36PM ; [facility]  
  
  
  
                                        Encounter for Immunization Medical Estab  
lished Patient with Doreen Cabrera   
CNP                                     2024  
  
  
  
                                                    Last Documented On   
4 7:36PM ; [facility]  
  
  
  
                                        Venipuncture was performed Medical Estab  
lished Patient with Doreen Cabrera   
CNP                                     2024  
  
  
  
                                                    Last Documented On   
4 7:36PM ; [facility]  
  
  
  
                                        Attention-deficit hyperactivity disorder  
  Established Patient with   
Leonie Short LISWS                     2024  
  
  
  
                                                    Last Documented On   
4 10:10AM ; [facility]  
  
  
  
                                                    Bipolar I disorder, most rec  
ent   
episode, depressed - mild               BH Established Patient with Leonie   
Short LISWS                             2024  
  
  
  
                                                    Last Documented On   
4 10:10AM ; [facility]  
  
  
  
                                        Post-traumatic stress disorder  Establ  
ished Patient with Leonie Short   
LISWS                                   2024  
  
  
  
                                                    Last Documented On   
4 10:10AM ; [facility]  
  
  
  
                                        [D64.9 - Anemia, unspecified] anemia Med  
ical Established Patient with   
Doreen Cabrera CNP                        2024  
  
  
  
                                                    Last Documented On   
4 6:51PM ; [facility]  
  
  
  
                                                    [Z68.43 - Body mass index [B  
MI]   
50.0-59.9, adult] assessment of body   
mass index                              Medical Established Patient with   
Doreen Cabrera CNP                        2024  
  
  
  
                                                    Last Documented On   
4 6:51PM ; [facility]  
  
  
  
                                                    Bipolar affective disorder,   
current   
episode depressed, mild                  Established Patient with Leonie   
Short LISWS                             2024  
  
  
  
                                                    Last Documented On   
4 5:16PM ; [facility]  
  
  
  
                                        Post-traumatic stress disorder  Establ  
ished Patient with Leonie Short   
LISWS                                   2024  
  
  
  
                                                    Last Documented On   
4 5:16PM ; [facility]  
  
  
  
                                                    Undifferentiated attention d  
eficit   
disorder                                 Established Patient with   
Leonie Short LISWS                     2024  
  
  
  
                                                    Last Documented On   
4 5:16PM ; [facility]  
  
  
  
                                        Visit for: screening for disorder BH Est  
ablished Patient with Leonie   
Short LISWS                             2024  
  
  
  
                                                    Last Documented On   
4 5:16PM ; [facility]  
  
  
  
                                                    [Z68.43 - Body mass index [B  
MI]   
50.0-59.9, adult] assessment of body   
mass index                              Medical Established Patient with   
Doreen Cabrera CNP                        2024  
  
  
  
                                                    Last Documented On   
4 7:50PM ; [facility]  
  
  
  
                                        Attention-deficit hyperactivity disorder  
 Medical Established Patient with   
Doreen Cabrera CNP                        2024  
  
  
  
                                                    Last Documented On   
4 7:50PM ; [facility]  
  
  
  
                                                    Diabetes Risk Test Score was  
 three   
score 3/27/2024                         Medical Established Patient with Doreen Cabrera CNP                              2024  
  
  
  
                                                    Last Documented On   
4 7:50PM ; [facility]  
  
  
  
                                        Visit for: screening for STD Medical Est  
ablished Patient with Doreen Cabrera   
CNP                                     2024  
  
  
  
                                                    Last Documented On   
4 7:50PM ; [facility]  
  
  
  
                                                    [Z68.32 - Body mass index [B  
MI]   
32.0-32.9, adult] assessment of body   
mass index                              Medical Established Patient with   
Doreenpaola Cabrera CNP                        2023  
  
  
  
                                                    Last Documented On   
3 6:01PM ; [facility]  
  
  
  
                                        Borderline personality disorder Medical   
Established Patient with Doreen   
Deborah CNP                              2023  
  
  
  
                                                    Last Documented On   
3 6:01PM ; [facility]  
  
  
  
                                        Screening for diabetes mellitus Medical   
Established Patient with Doreen   
Deborah CNP                              2023  
  
  
  
                                                    Last Documented On   
3 6:01PM ; [facility]  
  
  
  
                                        Assessment of body mass index Medical Es  
tablished Patient with Doreen   
Deborah CNP                              10/21/2022  
  
  
  
                                                    Last Documented On 10/21/202  
2 2:45PM ; [facility]  
  
  
  
                                                    Bipolar affective disorder,   
current   
episode manic                            Telebehavioral Health with Haylie   
Aguilar LPCC-S                        2022  
  
  
  
                                                    Last Documented On   
2 3:22PM ; [facility]  
  
  
  
                                                    Bipolar affective disorder,   
current   
episode manic                            Established Patient with Haylie   
Aguilar LPCC-S                        2022  
  
  
  
                                                    Last Documented On   
2 2:28PM ; [facility]  
  
  
  
                                                    Bipolar affective disorder,   
current   
episode depressed, severe with   
psychosis                                Telebehavioral Health with Haylie   
Aguilar LPCC-S                        2022  
  
  
  
                                                    Last Documented On   
2 3:29PM ; [facility]  
  
  
  
                                                    Assessment of body mass inde  
x [Body   
mass index [BMI] 50.0-59.9, adult]      Open Access - Established with Julieth   
Aliya CNP                               2022  
  
  
  
                                                    Last Documented On   
2 9:36AM ; [facility]  
  
  
  
                                                    Bipolar I disorder, most rec  
ent   
episode, manic                          Open Access - Established with Julieth   
Aliya CNP                               2022  
  
  
  
                                                    Last Documented On   
2 9:36AM ; [facility]  
  
  
  
                                        Post-traumatic stress disorder BH Establ  
ished Patient with Haylie Aguilar   
LPCC-S                                  2022  
  
  
  
                                                    Last Documented On   
2 3:20PM ; [facility]  
  
  
  
                                No cough        Medical Established Patient with  
 Doreen Deborah CNP 2022  
  
  
  
                                                    Last Documented On   
2 4:04PM ; [facility]  
  
  
  
                                                    Z68.43 - Body mass index [BM  
I]   
50.0-59.9, adult                        Medical Established Patient with   
Doreen Deborah CNP                        2022  
  
  
  
                                                    Last Documented On   
2 4:04PM ; [facility]  
  
  
  
                                                    Borderline personality disor  
danny Pt   
reported hx of sx/dx                     Established Patient with Haylie Aguilar LPCC-S                        2022  
  
  
  
                                                    Last Documented On   
2 4:08PM ; [facility]  
  
  
  
                                                    Assessment of body mass inde  
x [Body   
mass index [BMI] 50.0-59.9, adult]      Open Access - Established with Julieth Pisano CNP                               2022  
  
  
  
                                                    Last Documented On   
2 7:41PM ; [facility]  
  
  
  
                                                    Diabetes Risk Test Score was  
 three   
score 2022                         Open Access - Established with Julieth Pisano CNP                               2022  
  
  
  
                                                    Last Documented On   
2 7:41PM ; [facility]  
  
  
  
                                                    Bipolar I disorder, most rec  
ent   
episode, manic                           Established Patient with Avis   
Felder LPCC-S                          2021  
  
  
  
                                                    Last Documented On   
1 1:35AM ; [facility]  
  
  
  
                                        Borderline personality disorder  Estab  
lished Patient with Avis   
Felder LPCC-S                          2021  
  
  
  
                                                    Last Documented On   
1 1:35AM ; [facility]  
  
  
  
                                        Post-traumatic stress disorder  Establ  
ished Patient with Avis   
Felder LPCC-S                          2021  
  
  
  
                                                    Last Documented On   
1 1:35AM ; [facility]  
  
  
  
                                                    Assessment of visit for: bhargavi guevaraning for   
human immunodeficiency virus            Medical Established Patient with   
Doreen Deborah CNP                        2021  
  
  
  
                                                    Last Documented On   
1 2:56PM ; [facility]  
  
  
  
                                Nicotine dependence Medical Established Patient   
with Doreen Deborah CNP 2021  
  
  
  
                                                    Last Documented On   
1 2:56PM ; [facility]  
  
  
  
                                Tachycardia     Medical Established Patient with  
 Doreen Deborah CNP 2021  
  
  
  
                                                    Last Documented On   
1 2:56PM ; [facility]  
  
  
  
                                                    Z68.43 - Body mass index [BM  
I]   
50.0-59.9, adult                        Medical Established Patient with   
Doreen Deborah CNP                        2021  
  
  
  
                                                    Last Documented On   
1 2:56PM ; [facility]  
  
  
  
                                                    Bipolar I disorder, most rec  
ent   
episode, manic                          BH Established Patient with Leonie   
Short LISWS                             2021  
  
  
  
                                                    Last Documented On   
1 10:17AM ; [facility]  
  
  
  
                                                    Borderline personality disor  
danny per   
patient reported history                 Established Patient with Leonie   
Short LISWS                             2021  
  
  
  
                                                    Last Documented On   
1 10:17AM ; [facility]  
  
  
  
                                Nicotine dependence BH Established Patient with   
Leonie Short LISWS 2021  
  
  
  
                                                    Last Documented On   
1 10:17AM ; [facility]  
  
  
  
                                        Post-traumatic stress disorder  Establ  
ished Patient with Leonie Short   
LISWS                                   2021  
  
  
  
                                                    Last Documented On   
1 10:17AM ; [facility]  
  
  
  
                                Body mass index Medical Established Patient with  
 Doreen Deborah CNP 2021  
  
  
  
                                                    Last Documented On   
1 5:23PM ; [facility]  
  
  
  
                                Morbid obesity  Medical Established Patient with  
 Doreen Deborah CNP 2021  
  
  
  
                                                    Last Documented On   
1 5:23PM ; [facility]  
  
  
  
                                        Nicotine dependence uncomplicated Medica  
l Established Patient with Doreen   
Deborah CNP                              2021  
  
  
  
                                                    Last Documented On   
1 5:23PM ; [facility]  
  
  
  
                                                    Z68.42 - Body mass index [BM  
I]   
45.0-49.9, adult                        Medical Established Patient with   
Doreen Deborah CNP                        2021  
  
  
  
                                                    Last Documented On   
1 5:23PM ; [facility]  
  
  
  
                                Episodic mood disorders On Call with Pamela edwards CNP 2021  
  
  
  
                                                    Last Documented On   
1 7:49AM ; [facility]  
  
  
  
                                                    Mood disorders, NOS per tabatha  
ent   
reported history                         Established Patient with Leonie   
Short LISWS                             2021  
  
  
  
                                                    Last Documented On   
1 7:15PM ; [facility]  
  
  
  
                                        Post-traumatic stress disorder  Establ  
ished Patient with Leonie Short   
LISWS                                   2021  
  
  
  
                                                    Last Documented On   
1 7:15PM ; [facility]  
  
  
  
                                Morbid obesity  Medical Established Patient with  
 Doreen Deborah CNP 2021  
  
  
  
                                                    Last Documented On   
1 12:31PM ; [facility]  
  
  
  
                                Otitis externa  Medical Established Patient with  
 Doreen Deborah CNP 2021  
  
  
  
                                                    Last Documented On   
1 12:31PM ; [facility]  
  
  
  
                                                    Z68.42 - Body mass index [BM  
I]   
45.0-49.9, adult                        Medical Established Patient with   
Doreen Deborah CNP                        2021  
  
  
  
                                                    Last Documented On   
1 12:31PM ; [facility]  
  
  
  
                                        Post-traumatic stress disorder  Establ  
ished Patient with Leonie Short   
LISWS                                   06/10/2021  
  
  
  
                                                    Last Documented On 06/10/202  
1 10:05PM ; [facility]  
  
  
  
                                Morbid obesity  Medical Established Patient with  
 Doreen Deborah CNP 06/10/2021  
  
  
  
                                                    Last Documented On 06/10/202  
1 4:45PM ; [facility]  
  
  
  
                                                    Z68.42 - Body mass index [BM  
I]   
45.0-49.9, adult                        Medical Established Patient with   
Doreen Deborah CNP                        06/10/2021  
  
  
  
                                                    Last Documented On 06/10/202  
1 4:45PM ; [facility]  
  
  
  
                                        Post-traumatic stress disorder  Establ  
ished Patient with Leonie Short   
LISWS                                   2021  
  
  
  
                                                    Last Documented On   
1 11:59AM ; [facility]  
  
  
  
                                                    Assessment of visit for: bhargavi guevaraning for   
human immunodeficiency virus            Medical New Patient with Doreen   
Deborah CNP                              2021  
  
  
  
                                                    Last Documented On   
1 4:06PM ; [facility]  
  
  
  
                                                    Diabetes Risk Test Score was  
 one score   
2021                               Medical New Patient with Doreen   
Deborah CNP                              2021  
  
  
  
                                                    Last Documented On   
1 4:06PM ; [facility]  
  
  
  
                                Hypertension    Medical New Patient with Doreen C  
cate CNP 2021  
  
  
  
                                                    Last Documented On   
1 4:06PM ; [facility]  
  
  
  
                                Morbid obesity  Medical New Patient with Doreen C  
cate CNP 2021  
  
  
  
                                                    Last Documented On   
1 4:06PM ; [facility]  
  
  
  
                                Post-traumatic stress disorder Medical New Patie  
nt with Doreen Deborah CNP   
2021  
  
  
  
                                                    Last Documented On   
1 4:06PM ; [facility]  
  
  
  
                                                    Z68.42 - Body mass index [BM  
I]   
45.0-49.9, adult                        Medical New Patient with Doreen   
Deborah CNP                              2021  
  
  
  
                                                    Last Documented On   
1 4:06PM ; [facility]  
Work Phone: [phone]- Evaluation note  
  
Includes: Assessments for all patient encounters  
  
  
  
                                Findings        Encounter       Date  
   
                                                    Attention-deficit hyperactiv  
ity   
disorder                                BH Established Patient with Radha Young LSW                               2024  
  
  
  
                                                    Last Documented On   
4 4:15PM ; [facility]  
  
  
  
                                                    Bipolar I disorder, most rec  
ent   
episode, depressed - mild               BH Established Patient with Radha Young LSW                               2024  
  
  
  
                                                    Last Documented On   
4 4:15PM ; [facility]  
  
  
  
                                Nicotine dependence  Established Patient with   
Radhaleslie Young W 2024  
  
  
  
                                                    Last Documented On   
4 4:15PM ; [facility]  
  
  
  
                                        Post-traumatic stress disorder  Establ  
ished Patient with Radha Young   
W                                     2024  
  
  
  
                                                    Last Documented On   
4 4:15PM ; [facility]  
  
  
  
                                                    [Z68.43 - Body mass index [B  
MI]   
50.0-59.9, adult] assessment of body   
mass index                              Medical Established Patient with   
Doreen Cabrera CNP                        2024  
  
  
  
                                                    Last Documented On 10/04/202  
4 1:56PM ; [facility]  
  
  
  
                                                    Bipolar I disorder, most rec  
ent   
episode, depressed - mild               Medical Established Patient with Doreenpaola Cabrera CNP                              2024  
  
  
  
                                                    Last Documented On 10/04/202  
4 1:56PM ; [facility]  
  
  
  
                                Caries          Medical Established Patient with  
 Doreen Mccormacken CNP 2024  
  
  
  
                                                    Last Documented On 10/04/202  
4 1:56PM ; [facility]  
  
  
  
                                        Encounter for Immunization Medical Estab  
lished Patient with Doreen Mccormacken   
CNP                                     2024  
  
  
  
                                                    Last Documented On 10/04/202  
4 1:56PM ; [facility]  
  
  
  
                                                    Type 2 diabetes mellitus wit  
hout   
complication                            Medical Established Patient with   
Doreen Deborah CNP                        2024  
  
  
  
                                                    Last Documented On 10/04/202  
4 1:56PM ; [facility]  
  
  
  
                                        Attention-deficit hyperactivity disorder  
  Established Patient with   
Leonie Short LISWS                     2024  
  
  
  
                                                    Last Documented On   
4 3:28PM ; [facility]  
  
  
  
                                                    Bipolar I disorder, most rec  
ent   
episode, depressed - mild               BH Established Patient with Leonie Garrett LISWS                             2024  
  
  
  
                                                    Last Documented On   
4 3:28PM ; [facility]  
  
  
  
                                        Post-traumatic stress disorder BH Establ  
ished Patient with Leonie Short   
LISWS                                   2024  
  
  
  
                                                    Last Documented On   
4 3:28PM ; [facility]  
  
  
  
                                                    [E11.9 - Type 2 diabetes lobito  
litus   
without complications] type 2 diabetes   
mellitus                                Medical Established Patient with   
Doreen Cabrera CNP                        2024  
  
  
  
                                                    Last Documented On   
4 7:36PM ; [facility]  
  
  
  
                                                    [F41.1 - Generalized anxiety  
   
disorder] generalized anxiety   
disorder                                Medical Established Patient with   
Doreen Cabrera CNP                        2024  
  
  
  
                                                    Last Documented On   
4 7:36PM ; [facility]  
  
  
  
                                                    [I10 - Essential (primary)   
hypertension] essential hypertension    Medical Established Patient with   
Doreen Cabrera CNP                        2024  
  
  
  
                                                    Last Documented On   
4 7:36PM ; [facility]  
  
  
  
                                                    [N94.6 - Dysmenorrhea, unspe  
cified]   
dysmenorrhea                            Medical Established Patient with   
Doreen Cabrera CNP                        2024  
  
  
  
                                                    Last Documented On   
4 7:36PM ; [facility]  
  
  
  
                                                    [Z68.43 - Body mass index [B  
MI]   
50.0-59.9, adult] assessment of body   
mass index                              Medical Established Patient with   
Doreen Cabrera CNP                        2024  
  
  
  
                                                    Last Documented On   
4 7:36PM ; [facility]  
  
  
  
                                        Encounter for Immunization Medical Estab  
lished Patient with Doreen Cabrera   
CNP                                     2024  
  
  
  
                                                    Last Documented On   
4 7:36PM ; [facility]  
  
  
  
                                        Venipuncture was performed Medical Estab  
lished Patient with Doreen Cabrera   
CNP                                     2024  
  
  
  
                                                    Last Documented On   
4 7:36PM ; [facility]  
  
  
  
                                        Attention-deficit hyperactivity disorder  
  Established Patient with   
Leonie Short LISWS                     2024  
  
  
  
                                                    Last Documented On   
4 10:10AM ; [facility]  
  
  
  
                                                    Bipolar I disorder, most rec  
ent   
episode, depressed - mild                Established Patient with Leonei   
Short LISWS                             2024  
  
  
  
                                                    Last Documented On   
4 10:10AM ; [facility]  
  
  
  
                                        Post-traumatic stress disorder BH Establ  
ished Patient with Leonie Short   
LISWS                                   2024  
  
  
  
                                                    Last Documented On   
4 10:10AM ; [facility]  
  
  
  
                                        [D64.9 - Anemia, unspecified] anemia Med  
ical Established Patient with   
Doreen Cabrera CNP                        2024  
  
  
  
                                                    Last Documented On   
4 6:51PM ; [facility]  
  
  
  
                                                    [Z68.43 - Body mass index [B  
MI]   
50.0-59.9, adult] assessment of body   
mass index                              Medical Established Patient with   
Doreen Cabrera CNP                        2024  
  
  
  
                                                    Last Documented On   
4 6:51PM ; [facility]  
  
  
  
                                                    Bipolar affective disorder,   
current   
episode depressed, mild                  Established Patient with Leonie   
Short LISWS                             2024  
  
  
  
                                                    Last Documented On   
4 5:16PM ; [facility]  
  
  
  
                                        Post-traumatic stress disorder  Establ  
ished Patient with Leonie Short   
LISWS                                   2024  
  
  
  
                                                    Last Documented On   
4 5:16PM ; [facility]  
  
  
  
                                                    Undifferentiated attention d  
eficit   
disorder                                 Established Patient with   
Leonie Short LISWS                     2024  
  
  
  
                                                    Last Documented On   
4 5:16PM ; [facility]  
  
  
  
                                        Visit for: screening for disorder BH Est  
ablished Patient with Leonie   
Short LISWS                             2024  
  
  
  
                                                    Last Documented On   
4 5:16PM ; [facility]  
  
  
  
                                                    [Z68.43 - Body mass index [B  
MI]   
50.0-59.9, adult] assessment of body   
mass index                              Medical Established Patient with   
Doreen Cabrera CNP                        2024  
  
  
  
                                                    Last Documented On   
4 7:50PM ; [facility]  
  
  
  
                                        Attention-deficit hyperactivity disorder  
 Medical Established Patient with   
Doreen Cabrera CNP                        2024  
  
  
  
                                                    Last Documented On   
4 7:50PM ; [facility]  
  
  
  
                                                    Diabetes Risk Test Score was  
 three   
score 3/27/2024                         Medical Established Patient with Doreen Cabrera CNP                              2024  
  
  
  
                                                    Last Documented On   
4 7:50PM ; [facility]  
  
  
  
                                        Visit for: screening for STD Medical Est  
ablished Patient with Doreen Cabrera   
CNP                                     2024  
  
  
  
                                                    Last Documented On   
4 7:50PM ; [facility]  
  
  
  
                                                    [Z68.32 - Body mass index [B  
MI]   
32.0-32.9, adult] assessment of body   
mass index                              Medical Established Patient with   
Doreen Cabrera CNP                        2023  
  
  
  
                                                    Last Documented On   
3 6:01PM ; [facility]  
  
  
  
                                        Borderline personality disorder Medical   
Established Patient with Doreen Cabrera CNP                              2023  
  
  
  
                                                    Last Documented On   
3 6:01PM ; [facility]  
  
  
  
                                        Screening for diabetes mellitus Medical   
Established Patient with Doreen Cabrera CNP                              2023  
  
  
  
                                                    Last Documented On   
3 6:01PM ; [facility]  
  
  
  
                                        Assessment of body mass index Medical Es  
tablished Patient with Doreen Cabrera CNP                              10/21/2022  
  
  
  
                                                    Last Documented On 10/21/202  
2 2:45PM ; [facility]  
  
  
  
                                                    Bipolar affective disorder,   
current   
episode manic                            [facility] with Haylienicanor Martinerson LPCC-S                        2022  
  
  
  
                                                    Last Documented On   
2 3:22PM ; [facility]  
  
  
  
                                                    Bipolar affective disorder,   
current   
episode manic                            Established Patient with Haylie   
Aguilar LPCC-S                        2022  
  
  
  
                                                    Last Documented On   
2 2:28PM ; [facility]  
  
  
  
                                                    Bipolar affective disorder,   
current   
episode depressed, severe with   
psychosis                                Tele[facility] with Haylie   
Aguilar LPCC-S                        2022  
  
  
  
                                                    Last Documented On   
2 3:29PM ; [facility]  
  
  
  
                                                    Assessment of body mass inde  
x [Body   
mass index [BMI] 50.0-59.9, adult]      [facility] - Established with Julieth   
Aliya CNP                               2022  
  
  
  
                                                    Last Documented On   
2 9:36AM ; [facility]  
  
  
  
                                                    Bipolar I disorder, most rec  
ent   
episode, manic                          [facility] - Established with Julieth   
Aliya CNP                               2022  
  
  
  
                                                    Last Documented On   
2 9:36AM ; [facility]  
  
  
  
                                        Post-traumatic stress disorder BH Establ  
ished Patient with Haylie Aguilar   
LPCC-S                                  2022  
  
  
  
                                                    Last Documented On   
2 3:20PM ; [facility]  
  
  
  
                                No cough        Medical Established Patient with  
 Doreen Deborah CNP 2022  
  
  
  
                                                    Last Documented On   
2 4:04PM ; [facility]  
  
  
  
                                                    Z68.43 - Body mass index [BM  
I]   
50.0-59.9, adult                        Medical Established Patient with   
Doreen Deborah CNP                        2022  
  
  
  
                                                    Last Documented On   
2 4:04PM ; [facility]  
  
  
  
                                                    Borderline personality disor  
danny Pt   
reported hx of sx/dx                     Established Patient with Haylie Aguilar LPCC-S                        2022  
  
  
  
                                                    Last Documented On   
2 4:08PM ; [facility]  
  
  
  
                                                    Assessment of body mass inde  
x [Body   
mass index [BMI] 50.0-59.9, adult]      [facility] - Established with Julieth Pisano CNP                               2022  
  
  
  
                                                    Last Documented On   
2 7:41PM ; [facility]  
  
  
  
                                                    Diabetes Risk Test Score was  
 three   
score 2022                         Open Access - Established with Julieth   
Aliya CNP                               2022  
  
  
  
                                                    Last Documented On   
2 7:41PM ; [facility]  
  
  
  
                                                    Bipolar I disorder, most rec  
ent   
episode, manic                           Established Patient with Avis   
Felder LPCC-S                          2021  
  
  
  
                                                    Last Documented On   
1 1:35AM ; [facility]  
  
  
  
                                        Borderline personality disorder  Estab  
lished Patient with Avis   
Felder LPCC-S                          2021  
  
  
  
                                                    Last Documented On   
1 1:35AM ; [facility]  
  
  
  
                                        Post-traumatic stress disorder  Establ  
ished Patient with Avis   
Felder LPCC-S                          2021  
  
  
  
                                                    Last Documented On   
1 1:35AM ; [facility]  
  
  
  
                                                    Assessment of visit for: bhargavi guevaraning for   
human immunodeficiency virus            Medical Established Patient with   
Doreen Deborah CNP                        2021  
  
  
  
                                                    Last Documented On   
1 2:56PM ; [facility]  
  
  
  
                                Nicotine dependence Medical Established Patient   
with Doreen Deborah CNP 2021  
  
  
  
                                                    Last Documented On   
1 2:56PM ; [facility]  
  
  
  
                                Tachycardia     Medical Established Patient with  
 Doreen Deborah CNP 2021  
  
  
  
                                                    Last Documented On   
1 2:56PM ; [facility]  
  
  
  
                                                    Z68.43 - Body mass index [BM  
I]   
50.0-59.9, adult                        Medical Established Patient with   
Doreen Deborah CNP                        2021  
  
  
  
                                                    Last Documented On   
1 2:56PM ; [facility]  
  
  
  
                                                    Bipolar I disorder, most rec  
ent   
episode, manic                           Established Patient with Leonie   
Short LISWS                             2021  
  
  
  
                                                    Last Documented On   
1 10:17AM ; [facility]  
  
  
  
                                                    Borderline personality disor  
danny per   
patient reported history                 Established Patient with Leonie   
Short LISWS                             2021  
  
  
  
                                                    Last Documented On   
1 10:17AM ; [facility]  
  
  
  
                                Nicotine dependence BH Established Patient with   
Leonie Short LISWS 2021  
  
  
  
                                                    Last Documented On   
1 10:17AM ; [facility]  
  
  
  
                                        Post-traumatic stress disorder  Establ  
ished Patient with Leonie Short   
LISWS                                   2021  
  
  
  
                                                    Last Documented On   
1 10:17AM ; [facility]  
  
  
  
                                Body mass index Medical Established Patient with  
 Doreen Deborah CNP 2021  
  
  
  
                                                    Last Documented On   
1 5:23PM ; [facility]  
  
  
  
                                Morbid obesity  Medical Established Patient with  
 Doreen Deborah CNP 2021  
  
  
  
                                                    Last Documented On   
1 5:23PM ; [facility]  
  
  
  
                                        Nicotine dependence uncomplicated Medica  
l Established Patient with Doreen   
Deborah CNP                              2021  
  
  
  
                                                    Last Documented On   
1 5:23PM ; [facility]  
  
  
  
                                                    Z68.42 - Body mass index [BM  
I]   
45.0-49.9, adult                        Medical Established Patient with   
Doreen Deborah CNP                        2021  
  
  
  
                                                    Last Documented On   
1 5:23PM ; [facility]  
  
  
  
                                Episodic mood disorders On Call with Pamela edwards CNP 2021  
  
  
  
                                                    Last Documented On   
1 7:49AM ; [facility]  
  
  
  
                                                    Mood disorders, NOS per tabatha  
ent   
reported history                         Established Patient with Leonie   
Short LISWS                             2021  
  
  
  
                                                    Last Documented On   
1 7:15PM ; [facility]  
  
  
  
                                        Post-traumatic stress disorder  Establ  
ished Patient with Leonie Short   
LISWS                                   2021  
  
  
  
                                                    Last Documented On   
1 7:15PM ; [facility]  
  
  
  
                                Morbid obesity  Medical Established Patient with  
 Doreen Deborah CNP 2021  
  
  
  
                                                    Last Documented On   
1 12:31PM ; [facility]  
  
  
  
                                Otitis externa  Medical Established Patient with  
 Doreen Deborah CNP 2021  
  
  
  
                                                    Last Documented On   
1 12:31PM ; [facility]  
  
  
  
                                                    Z68.42 - Body mass index [BM  
I]   
45.0-49.9, adult                        Medical Established Patient with   
Doreen Deborah CNP                        2021  
  
  
  
                                                    Last Documented On   
1 12:31PM ; [facility]  
  
  
  
                                        Post-traumatic stress disorder  Establ  
ished Patient with Leonie Short   
LISWS                                   06/10/2021  
  
  
  
                                                    Last Documented On 06/10/202  
1 10:05PM ; [facility]  
  
  
  
                                Morbid obesity  Medical Established Patient with  
 Doreen Deborah CNP 06/10/2021  
  
  
  
                                                    Last Documented On 06/10/202  
1 4:45PM ; [facility]  
  
  
  
                                                    Z68.42 - Body mass index [BM  
I]   
45.0-49.9, adult                        Medical Established Patient with   
Doreen Deborah CNP                        06/10/2021  
  
  
  
                                                    Last Documented On 06/10/202  
1 4:45PM ; [facility]  
  
  
  
                                        Post-traumatic stress disorder BH Establ  
ished Patient with Leonie Short   
LISWS                                   2021  
  
  
  
                                                    Last Documented On   
1 11:59AM ; [facility]  
  
  
  
                                                    Assessment of visit for: scr  
eening for   
human immunodeficiency virus            Medical New Patient with Doreen   
Deborah CNP                              2021  
  
  
  
                                                    Last Documented On   
1 4:06PM ; [facility]  
  
  
  
                                                    Diabetes Risk Test Score was  
 one score   
2021                               Medical New Patient with Doreen   
Deborah CNP                              2021  
  
  
  
                                                    Last Documented On   
1 4:06PM ; [facility]  
  
  
  
                                Hypertension    Medical New Patient with Doreen C  
cate CNP 2021  
  
  
  
                                                    Last Documented On   
1 4:06PM ; [facility]  
  
  
  
                                Morbid obesity  Medical New Patient with Doreen C  
cate CNP 2021  
  
  
  
                                                    Last Documented On   
1 4:06PM ; [facility]  
  
  
  
                                Post-traumatic stress disorder Medical New Patie  
nt with Doreen Deborah CNP   
2021  
  
  
  
                                                    Last Documented On   
1 4:06PM ; [facility]  
  
  
  
                                                    Z68.42 - Body mass index [BM  
I]   
45.0-49.9, adult                        Medical New Patient with Doreen Cabrera CNP                              2021  
  
  
  
                                                    Last Documented On   
1 4:06PM ; [facility]  
Work Phone: [phone]- Progress note*   
  
Progress note  
  
  
  
                                Date            Encounter       Last Documented   
by  
   
                                2024       Established Patient Last docu  
mented on 2024; 4:15 PM, Radha LEIGHW; [facility]  
  
  
  
                                                      
  
  
** Active Problems & Conditions **  
- F90.9 - Attention-deficit Hyperactivity Disorder  
- F31.31 - Bipolar I Disorder, Most Recent Episode, Depressed Mild  
- E11.9 - Diabetes Mellitus Type 2 Without Complication  
- N94.6 - Dysmenorrhea  
- F43.10 - Post-traumatic Stress Disorder  
  
** Subjective **  
[facility] met with patient for mood and medications.  
PHQ9 score 18 ESTHELA score 21  
Patient reports that her depression and anxiety are not good at the moment.  
She is not sleeping well and does not feel that the trazedone is helping. She 
would   
like to start seroquel.  
Patient would like to go back to counseling and was provided resources.  
Pt is reporting that she is haivng nightmares and they are from her past.  
Discussed EMDR therapy for Pt to address her PTSD.  
Encouraged patient to engage in more physical activity to help with sleep. She   
reports that she just not tired to be able to sleep but feels tired. . Pt would 
like   
to walk around a track but is fearful that she will see her father.  
Pt was present with her mother who agreed to go with her walking.  
Discussed [facility] patient the benefits of seeing a psychiatrist for evaluation for 
ADHD.  
  
** Chief Complaint **  
The Chief Complaint is: Follow up for mood and medications.  
  
** History of Present Illness **  
Linnette Beckham is a 24 year old female.  
- Appetite not normal - Irritability  
- Anxiety - Depression severe - Sleep disturbances Will be starting seroquel - 
Energy   
level is good - Easily distracted  
  
** Current Medication **  
- Alcohol Pads 70% use to cleanse skin prior to checking blood sugars, 90 days, 
3   
refills  
- ARIPiprazole 5 MG Oral Tablet take 1 tablet by mouth once daily, 30 days, 5 
refills  
- Atorvastatin Calcium 20 MG Oral Tablet take 1 tablet by mouth once daily at   
bedtime, 30 days, 5 refills  
- Blood Glucose System Sriram Kit use to check sugars once daily (use what is 
covered),   
30 days, 0 refills  
- busPIRone HCl 10 MG Oral Tablet take 1 tablet by mouth twice daily (d/c 5 mg 
rx),   
30 days, 5 refills  
- CareSens Lancets Miscellaneous use to check sugars once daily (dispense what 
is   
covered), 90 days, 3 refills  
- Colace 100 MG Oral Capsule take 1 capsule by mouth once daily at bedtime as 
needed   
for constipation, 30 days, 5 refills  
- [employer] Iron 325 (65 Fe) MG Oral Tablet take 1 tablet by mouth once daily, 30 
days, 5   
refills  
- Intuniv 1 MG Oral Tablet Extended Release 24 Hour take 1 tablet by mouth once 
daily   
at bedtime, 30 days, 5 refills  
- Intuniv 1 MG Oral Tablet Extended Release 24 Hour take 1 tablet by mouth once 
daily   
at bedtime, 30 days, 5 refills  
- Januvia 50 MG Oral Tablet take 1 tablet by mouth once daily, 30 days, 5 
refills  
- lamoTRIgine 100 MG Oral Tablet take 1 tablet by mouth once daily, 30 days, 5   
refills  
- lamoTRIgine 100 MG Oral Tablet take 1 tablet by mouth once daily, 30 days, 5   
refills  
- Lisinopril 5 MG Oral Tablet take 1 tablet by mouth once daily, 30 days, 5 
refills  
- MiraLax 17 GM/SCOOP Oral Powder mix 1 scoop with 8 ounces once daily, 30 days,
 2   
refills  
- Narcan 4 MG/0.1ML Nasal Liquid spray in nostril for symptoms of overdose , may
   
repeat in 2 minutes if symptoms persist, 1 days, 0 refills  
- OneTouch Ultra In Vitro Strip USE ONE TEST STRIP TO CHECK BLOOD SUGARS ONCE 
DAILY,   
90 days, 3 refills  
- Prazosin HCl 1 MG Oral Capsule take 1 capsule by mouth twice daily, 30 days, 5
   
refills  
- Prazosin HCl 1 MG Oral Capsule take 1 capsule by mouth twice daily, 30 days, 5
   
refills  
- SEROquel 50 MG Oral Tablet take 1 tablet by mouth once daily at bedtime (d/c   
trazodone), 30 days, 1 refills  
- Sertraline HCl 50 MG Oral Tablet take 1 tablet by mouth once daily, 30 days, 5
   
refills  
- Sertraline HCl 50 MG Oral Tablet take 1 tablet by mouth once daily, 30 days, 5
   
refills  
- Slynd 4 MG Oral Tablet take 1 tablet by mouth at the same time everyday to 
help   
regulate your period, 28 days, 2 refills  
  
** Past Medical/Surgical History **  
Reported:  
No Safety Measures. Has sex without a condom.  
Medical: No previous hospitalizations. Chronic illness and Sexually transmitted   
infection Partners sexually transmitted infection status known.  
Immunization History: Recent immunization for flu.  
Exposure: Exposure to COVID-19.  
Pregnancy: Previously pregnant 1 time(s) and para having 0 live birth(s). Not   
planning a pregnancy in the next year.  
Diagnoses:  
Polycystic Ovarian Syndrome (PCOS).  
Migraine headache.  
Psychiatric disorders biopolar disorder  
Anxiety disorder  
POTS.  
Procedural:  
- Insertion of ear pressure equalization tubes in both ears  
Surgical:  
- Tonsillectomy  
- Tonsillectomy with adenoidectomy  
  
** Social History **  
Environmental Exposure: Secondhand cigarette smoke exposure.  
Personal: Recent emotional stress.  
Tobacco use: Using electronic cigarettes/vaping.  
Alcohol: Not using alcohol.  
Drug Use: Not using drugs.  
Housing And Economic Circumstances: Lives with parents.  
Sexual: Sexual orientation Lesbian or David and gender identity Other.  
  
** Allergies **  
- Abilify Reaction: groggy  
- Augmentin Reaction: Shock  
- Metformin Reaction: Nausea, Vomiting  
- NO KNOWN ENVIRONMENTAL ALLERGIES  
- NO KNOWN FOOD ALLERGIES  
- Sertraline Reaction: slow/groggy  
- Trazodone Hydrochloride Reaction: Panic attack  
  
** Family History **  
Paternal:  
Systemic hypertension  
Oncologic disorder  
Maternal:  
Systemic hypertension  
Epilepsy and recurrent seizures  
Psychiatric disorders  
Oncologic disorder  
Fraternal:  
Psychiatric disorders  
  
** Physical Findings **  
General Appearance:  
- Normal Appearance.  
Neurological:  
- Cognitive Functions was Normal. - Oriented to time, place, and person. - No   
hallucinations. - Judgement was not impaired.  
Speech: - Is Normal. - No articulation abnormalities.  
Psychiatric:  
- Mood is Euthymic. - Attitude Open.  
Appearance: - Normal.  
Demonstrated Behavior: - Motor Activity Normal Activity. - Eye Contact 
Appropriate.  
Affect: - Congruent with the mood.  
Thought Processes: - Not impaired.  
Thought Content: - Revealed no impairment. - Insight was intact. - No delusions.
 - No   
suicidal ideation. - No Passive thoughts of Death. - No suicidal plans. - No 
suicidal   
intent. - No homicidal ideations. - No homicidal plans. - No homicidal intent.  
Past Medical:  
- No repetitive self injurious behavior.  
  
** Assessment **  
- Attention-deficit hyperactivity disorder [F90.9 - Attention-deficit 
hyperactivity   
disorder, unspecified type]  
- Nicotine dependence [F17.200 - Nicotine dependence, unspecified, 
uncomplicated]  
- Bipolar I disorder, most recent episode, depressed - mild [F31.31 - Bipolar   
disorder, current episode depressed, mild]  
- Post-traumatic stress disorder [F43.10 - Post-traumatic stress disorder,   
unspecified]  
  
** Therapy **  
- Developmental/Behavioral Screening & Testing - PHQ9 and 
Developmental/Behavioral   
Screening & Testing - GAD7.  
- Brief solution-focused.  
- Adherent with medications.  
-  Visit 30 Minutes.  
- Referral to mental health team.  
- Plan - Begin taking the seroquel at bedtime and Collaborated with patient and   
provider:  
  
** Counseling/Education **  
*BHP offered active and supportive listening, normalized emotions and feelings, 
and   
processed  
current stressors.  
*Discussed engageing in counseling and looking into EMDR to address PTSD and   
increased nightmares.  
  
** Plan **  
*Continue to take medication(s) as prescribed.  
*BHP to follow-up with patient at next visit as scheduled.  
*Patient to follow up as needed/scheduled.  
  
** Care Team **  
- Doreen Cabrera CNP  
  
** Health Reminders **  
- Assess Tobacco Use satisfied 2024.  
- ESTHELA-2 satisfied 2024.  
- PHQ9 / PHQA satisfied 2024.  
  
** User Defined 1 **  
ESTHELA-2 score was six 2024, ESTHELA-7 score was 21 [ESTHELA-7] Feeling nervous, 
anxious or   
on edge? + 3 pt : Nearly every day, [ESTHELA-7] Feeling nervous, anxious or on edge?
+ 3   
pt : Nearly every day, [ESTHELA-7] Not being able to stop or control worrying? + 3 
pt :   
Nearly every day, [ESTHELA-7] Not being able to stop or control worrying? + 3 pt : 
Nearly   
every day, [ESTHELA-7] Worrying too much about different things? + 3 pt : Nearly 
every   
day, [ESTHELA-7] Trouble relaxing? + 3 pt : Nearly every day, [ESTHELA-7] Being so 
restless   
that it's hard to sit still? + 3 pt : Nearly every day, [ESTHELA-7] Becoming easily   
annoyed or irritable? + 3 pt : Nearly every day, [ESTHELA-7] Feeling afraid as if   
something awful might happen? + 3 pt : Nearly every day, Patient Health 
Questionnaire   
9-Item total score was 18 2024 If you checked off problems, how difficult 
is it   
for you to do your work? + : Very difficult, [PHQ-9-1] Little interest or 
pleasure in   
doing things? + 3 pt : Nearly every day, [PHQ-9-2] Feeling down, depressed, or   
hopeless? + 3 pt : Nearly every day, [PHQ-9-3] Trouble falling or staying asleep
 or   
sleeping too much? + 3 pt : Nearly every day, [PHQ-9-4] Feeling tired or having   
little energy? + 3 pt : Nearly every day, [PHQ-9-5] Poor appetite or overeating?
 + 3   
pt : Nearly every day, [PHQ-9-6] Feeling bad about yourself-or that you are a 
failure   
+ 0 pt : Not at all, [PHQ-9-7] Trouble concentrating on things such as reading 
the   
newspaper + 3 pt : Nearly every day, [PHQ-9-8] Moving or speaking so slowly that
   
other people have noticed. + 0 pt : Not at all, [PHQ-9-9] Thoughts that you 
would be   
better off dead or hurting yourself? + 0 pt : Not at all, and ESTHELA If you checked
 off   
problems, how difficult is it for you to do your work, take care of things, or 
get   
along? Very difficult.  
  
  
[facility]09- Progress note*   
  
Progress note  
  
  
  
                                Date            Encounter       Last Documented   
by  
   
                                2024      Medical Established Patient Last  
 documented on 10/04/2024; 1:56 PM,   
Doreen Cabrera CNP; [facility]  
  
  
  
                                                      
  
  
** Active Problems & Conditions **  
- F90.9 - Attention-deficit Hyperactivity Disorder  
- F31.31 - Bipolar I Disorder, Most Recent Episode, Depressed Mild  
- E11.9 - Diabetes Mellitus Type 2 Without Complication  
- N94.6 - Dysmenorrhea  
- F43.10 - Post-traumatic Stress Disorder  
  
** Chief Complaint **  
The Chief Complaint is: Patient reports her mental health is a problem. Patient 
isn't   
sure the zoloft is working for her. Patient said she is feeling very down. 
Patient is   
not sleeping, but has not picked up seroquel. Patient has been still taking the   
trazodone.  
  
** Referred Here **  
Not referred by urgent care clinic and not the emergency room. No prior 
encounters.  
- Data to be reviewed: no clinical lab tests  
  
** History of Present Illness **  
Linentte Beckham is a 24 year old female.  
- Allergy list reviewed - Reviewed Medications - Medication list reviewed  
- Date of last menstruation 2024 - Pregnancy test - would not like a 
pregnancy   
test  
Presents for med follow up , did not know seroquel was ready at the pharmacy so 
hasnt   
been taking . we do not have any hpv vaccines here today , needs 2nd dose. is 
going   
thru a vape about every 3 days , wants to quit but aware it would be better to 
wait   
until mental health is better , advised on the harm of nicotine / vapes  
  
** Current Medication **  
- Alcohol Pads 70% use to cleanse skin prior to checking blood sugars, 90 days, 
3   
refills  
- ARIPiprazole 5 MG Oral Tablet take 1 tablet by mouth once daily, 30 days, 5 
refills  
- Atorvastatin Calcium 20 MG Oral Tablet take 1 tablet by mouth once daily at   
bedtime, 30 days, 5 refills  
- Blood Glucose System Sriram Kit use to check sugars once daily (use what is 
covered),   
30 days, 0 refills  
- busPIRone HCl 10 MG Oral Tablet take 1 tablet by mouth twice daily (d/c 5 mg 
rx),   
30 days, 5 refills  
- CareSens Lancets Miscellaneous use to check sugars once daily (dispense what 
is   
covered), 90 days, 3 refills  
- Colace 100 MG Oral Capsule take 1 capsule by mouth once daily at bedtime as 
needed   
for constipation, 30 days, 5 refills  
- [employer] Iron 325 (65 Fe) MG Oral Tablet take 1 tablet by mouth once daily, 30 
days, 5   
refills  
- Intuniv 1 MG Oral Tablet Extended Release 24 Hour take 1 tablet by mouth once 
daily   
at bedtime, 30 days, 5 refills  
- Januvia 50 MG Oral Tablet take 1 tablet by mouth once daily, 30 days, 5 
refills  
- lamoTRIgine 100 MG Oral Tablet take 1 tablet by mouth once daily, 30 days, 5   
refills  
- Lisinopril 5 MG Oral Tablet take 1 tablet by mouth once daily, 30 days, 5 
refills  
- MiraLax 17 GM/SCOOP Oral Powder mix 1 scoop with 8 ounces once daily, 30 days,
 2   
refills  
- Narcan 4 MG/0.1ML Nasal Liquid spray in nostril for symptoms of overdose , may
   
repeat in 2 minutes if symptoms persist, 1 days, 0 refills  
- OneTouch Ultra In Vitro Strip USE ONE TEST STRIP TO CHECK BLOOD SUGARS ONCE 
DAILY,   
90 days, 3 refills  
- Prazosin HCl 1 MG Oral Capsule take 1 capsule by mouth twice daily, 30 days, 5
   
refills  
- SEROquel 50 MG Oral Tablet take 1 tablet by mouth once daily at bedtime (d/c   
trazodone), 30 days, 1 refills  
- Sertraline HCl 50 MG Oral Tablet take 1 tablet by mouth once daily, 30 days, 5
   
refills  
- Slynd 4 MG Oral Tablet take 1 tablet by mouth at the same time everyday to 
help   
regulate your period, 28 days, 2 refills  
  
** Past Medical/Surgical History **  
Reported:  
Has sex without a condom.  
Medical: No previous hospitalizations. Chronic illness and Sexually transmitted   
infection Partners sexually transmitted infection status known.  
Exposure: Exposure to COVID-19.  
Pregnancy: Previously pregnant 1 time(s) and para having 0 live birth(s). Not   
planning a pregnancy in the next year.  
Legal Documents: Consent form on file for procedure.  
Diagnoses:  
Polycystic Ovarian Syndrome (PCOS).  
Migraine headache.  
Psychiatric disorders biopolar disorder  
Anxiety disorder  
POTS.  
Procedural:  
- Insertion of ear pressure equalization tubes in both ears  
Surgical:  
- Tonsillectomy  
- Tonsillectomy with adenoidectomy  
  
** Social History **  
Environmental Exposure: Secondhand cigarette smoke exposure.  
Tobacco use: Using electronic cigarettes/vaping.  
Alcohol: Not using alcohol.  
Drug Use: Not using drugs denied by patient.  
Sexual: Sexually active, sexual orientation Lesbian or David, gender identity 
Other,   
and birth control is being practiced Pills.  
  
** Allergies **  
- Abilify Reaction: groggy  
- Augmentin Reaction: Shock  
- Metformin Reaction: Nausea, Vomiting  
- NO KNOWN ENVIRONMENTAL ALLERGIES  
- NO KNOWN FOOD ALLERGIES  
- Sertraline Reaction: slow/groggy  
- Trazodone Hydrochloride Reaction: Panic attack  
  
** Family History **  
Paternal:  
Systemic hypertension  
Oncologic disorder  
Maternal:  
Systemic hypertension  
Epilepsy and recurrent seizures  
Psychiatric disorders  
Oncologic disorder  
Fraternal:  
Psychiatric disorders  
  
** Review Of Systems **  
Head: No head symptoms.  
Neck: No neck symptoms.  
Eyes: No eye symptoms.  
Otolaryngeal: No ear symptoms, no nasal symptoms, no nose and sinus finding, no   
throat symptoms, no oral cavity symptoms, and no jaw symptoms.  
Breasts: No breast symptoms.  
Cardiovascular: No cardiovascular symptoms and no chest pain or discomfort.  
Pulmonary: No pulmonary symptoms.  
Gastrointestinal: No gastrointestinal symptoms.  
Genitourinary: No genitourinary symptoms.  
Musculoskeletal: No musculoskeletal symptoms.  
Neurological: No neurological symptoms.  
Psychological: No psychological symptoms.  
Skin: No skin symptoms.  
Cardiovascular: Edema not present.  
  
** Physical Findings **  
- Vitals taken 2024 04:27 pm  
BP-Sitting L135/86 mmHg  
BP Cuff SizeLarge  
Pulse Rate-Gtaxomy587 bpm  
Respiration Rate18 per min  
Temp-Oral97.6 F  
Mwhkut40 in  
Ppiuhp074 lbs  
Body Mass Index57.2 kg/m2  
Body Surface Area2.4 m2  
Oxygen Fdnlivwvxk54 %  
  
Vital Signs:  
- Systolic blood pressure 130 - 139 mmHg.  
- Diastolic blood pressure 80-89 mmHg.  
General Appearance:  
- Awake. - Alert. - Well developed. - Well nourished. - In no acute distress.  
Lungs:  
- Respiration rhythm and depth was normal. - Clear to auscultation.  
Cardiovascular:  
Heart Rate And Rhythm: - Normal.  
Heart Sounds: - Normal.  
Murmurs: - No murmurs were heard.  
Musculoskeletal System:  
General/bilateral: - Normal movement of all extremities.  
Foot:  
General/bilateral: - Normal 10-g monofilament exam of right foot. - Normal 10-g   
monofilament exam of left foot.  
Skin:  
- General appearance was normal.  
Physician's Services:  
- Diabetic Foot Exam Abnormal  
  
** Tests **  
Blood Analysis:  
Blood Endocrine Laboratory Tests: Value  
Blood glucose level by fingerstick Non-fasting 122 mg/dl  
Laboratory-based Chemistry:  
Other Laboratory Tests:  
Screening for sexually transmitted infections was not performed.  
Imaging:  
Ophthalmoscopy:  
Optomap completed Both eyes (OU) and with physician / healthcare professional   
interpretation and report.  
  
** Assessment **  
- Z68.43 - Body mass index [BMI] 50.0-59.9, adult  
- Z23 - Encounter for immunization  
- K02.9 - Dental caries, unspecified  
- E11.9 - Type 2 diabetes mellitus without complications  
- F31.31 - Bipolar disorder, current episode depressed, mild  
  
** Previous Tests **  
Laboratory Studies:  
Renal Function:  
Glomerular filtration rate 2024 >60.  
  
** Therapy **  
- Patient has agreed to receive flu vaccine today.  
- Silver diamine fluoride 38% application by physician or other QHP 10 Teeth.  
- Immunization administration, by injection, one vaccine.  
  
** Vaccinations **  
- Fluarix 0.5mL for 6 months and older Dose #2 Status: Administered Date: 
2024  
  
- Received dose of Fluarix 0.5mL (6 months and up)  
  
** Counseling/Education **  
- Wishing to stop using electronic cigarettes/vaping  
- Discussed nutritional needs teach healthy choices including fruits and 
vegetables  
- Patient education about a proper diet  
- Discussed concerns about exercise: promote physical activity  
  
** Plan **  
StartCited- Attention-deficit hyperactivity disorder, unspecified type  
Intuniv 1 MG tablet take 1 tablet by mouth once daily at bedtime, 30 days, 5 
refills  
EndCited  
StartCited- Other  
Follow-up Appointment  
1 month med check  
lamoTRIgine 100 MG tablet take 1 tablet by mouth once daily, 30 days, 5 refills  
Prazosin HCl 1 MG capsule take 1 capsule by mouth twice daily, 30 days, 5 
refills  
Sertraline HCl 50 MG tablet take 1 tablet by mouth once daily, 30 days, 5 
refills  
EndCited  
** Care Team **  
- Doreen Cabrera CNP  
  
** Health Reminders **  
- Assess BMI satisfied 2024.  
- Assess Tobacco Use satisfied 2024.  
- Eye Exam satisfied 2024.  
- Follow Up Plan BMI Management satisfied 2024.  
- Foot Exam satisfied 2024.  
  
** User Defined 1 **  
Not planning a pregnancy in the next year.  
  
** Bottom of Document **  
Illustration  
  
  
[facility]09- Progress note*   
  
Progress note  
  
  
  
                                Date            Encounter       Last Documented   
by  
   
                                2024      Medical Established Patient Last  
 documented on 10/09/2024; 2:09 PM,   
Doreen Cabrera CNP; [facility]  
  
  
  
                                                      
  
  
** Active Problems & Conditions **  
- F90.9 - Attention-deficit Hyperactivity Disorder  
- F31.31 - Bipolar I Disorder, Most Recent Episode, Depressed Mild  
- E11.9 - Diabetes Mellitus Type 2 Without Complication  
- N94.6 - Dysmenorrhea  
- F43.10 - Post-traumatic Stress Disorder  
  
** Chief Complaint **  
The Chief Complaint is: Patient reports her mental health is a problem. Patient 
isn't   
sure the zoloft is working for her. Patient said she is feeling very down. 
Patient is   
not sleeping, but has not picked up seroquel. Patient has been still taking the   
trazodone.  
  
** Referred Here **  
Not referred by urgent care clinic and not the emergency room. No prior 
encounters.  
- Data to be reviewed: no clinical lab tests  
  
** History of Present Illness **  
Linnette Beckham is a 24 year old female.  
- Allergy list reviewed - Reviewed Medications - Medication list reviewed  
- Date of last menstruation 2024 - Pregnancy test - would not like a 
pregnancy   
test  
Presents for med follow up , did not know seroquel was ready at the pharmacy so 
hasnt   
been taking . we do not have any hpv vaccines here today , needs 2nd dose. is 
going   
thru a vape about every 3 days , wants to quit but aware it would be better to 
wait   
until mental health is better , advised on the harm of nicotine / vapes  
  
** Current Medication **  
- Alcohol Pads 70% use to cleanse skin prior to checking blood sugars, 90 days, 
3   
refills  
- ARIPiprazole 5 MG Oral Tablet take 1 tablet by mouth once daily, 30 days, 5 
refills  
- Atorvastatin Calcium 20 MG Oral Tablet take 1 tablet by mouth once daily at   
bedtime, 30 days, 5 refills  
- Blood Glucose System Sriram Kit use to check sugars once daily (use what is 
covered),   
30 days, 0 refills  
- busPIRone HCl 10 MG Oral Tablet take 1 tablet by mouth twice daily (d/c 5 mg 
rx),   
30 days, 5 refills  
- CareSens Lancets Miscellaneous use to check sugars once daily (dispense what 
is   
covered), 90 days, 3 refills  
- Colace 100 MG Oral Capsule take 1 capsule by mouth once daily at bedtime as 
needed   
for constipation, 30 days, 5 refills  
- [employer] Iron 325 (65 Fe) MG Oral Tablet take 1 tablet by mouth once daily, 30 
days, 5   
refills  
- Intuniv 1 MG Oral Tablet Extended Release 24 Hour take 1 tablet by mouth once 
daily   
at bedtime, 30 days, 5 refills  
- Januvia 50 MG Oral Tablet take 1 tablet by mouth once daily, 30 days, 5 
refills  
- lamoTRIgine 100 MG Oral Tablet take 1 tablet by mouth once daily, 30 days, 5   
refills  
- Lisinopril 5 MG Oral Tablet take 1 tablet by mouth once daily, 30 days, 5 
refills  
- MiraLax 17 GM/SCOOP Oral Powder mix 1 scoop with 8 ounces once daily, 30 days,
 2   
refills  
- Narcan 4 MG/0.1ML Nasal Liquid spray in nostril for symptoms of overdose , may
   
repeat in 2 minutes if symptoms persist, 1 days, 0 refills  
- OneTouch Ultra In Vitro Strip USE ONE TEST STRIP TO CHECK BLOOD SUGARS ONCE 
DAILY,   
90 days, 3 refills  
- Prazosin HCl 1 MG Oral Capsule take 1 capsule by mouth twice daily, 30 days, 5
   
refills  
- SEROquel 50 MG Oral Tablet take 1 tablet by mouth once daily at bedtime (d/c   
trazodone), 30 days, 1 refills  
- Sertraline HCl 50 MG Oral Tablet take 1 tablet by mouth once daily, 30 days, 5
   
refills  
- Slynd 4 MG Oral Tablet take 1 tablet by mouth at the same time everyday to 
help   
regulate your period, 28 days, 2 refills  
  
** Past Medical/Surgical History **  
Reported:  
Has sex without a condom.  
Medical: No previous hospitalizations. Chronic illness and Sexually transmitted   
infection Partners sexually transmitted infection status known.  
Exposure: Exposure to COVID-19.  
Pregnancy: Previously pregnant 1 time(s) and para having 0 live birth(s). Not   
planning a pregnancy in the next year.  
Legal Documents: Consent form on file for procedure.  
Diagnoses:  
Polycystic Ovarian Syndrome (PCOS).  
Migraine headache.  
Psychiatric disorders biopolar disorder  
Anxiety disorder  
POTS.  
Procedural:  
- Insertion of ear pressure equalization tubes in both ears  
Surgical:  
- Tonsillectomy  
- Tonsillectomy with adenoidectomy  
  
** Social History **  
Environmental Exposure: Secondhand cigarette smoke exposure.  
Tobacco use: Using electronic cigarettes/vaping.  
Alcohol: Not using alcohol.  
Drug Use: Not using drugs denied by patient.  
Sexual: Sexually active, sexual orientation Lesbian or David, gender identity 
Other,   
and birth control is being practiced Pills.  
  
** Allergies **  
- Abilify Reaction: groggy  
- Augmentin Reaction: Shock  
- Metformin Reaction: Nausea, Vomiting  
- NO KNOWN ENVIRONMENTAL ALLERGIES  
- NO KNOWN FOOD ALLERGIES  
- Sertraline Reaction: slow/groggy  
- Trazodone Hydrochloride Reaction: Panic attack  
  
** Family History **  
Paternal:  
Systemic hypertension  
Oncologic disorder  
Maternal:  
Systemic hypertension  
Epilepsy and recurrent seizures  
Psychiatric disorders  
Oncologic disorder  
Fraternal:  
Psychiatric disorders  
  
** Review Of Systems **  
Head: No head symptoms.  
Neck: No neck symptoms.  
Eyes: No eye symptoms.  
Otolaryngeal: No ear symptoms, no nasal symptoms, no nose and sinus finding, no   
throat symptoms, no oral cavity symptoms, and no jaw symptoms.  
Breasts: No breast symptoms.  
Cardiovascular: No cardiovascular symptoms and no chest pain or discomfort.  
Pulmonary: No pulmonary symptoms.  
Gastrointestinal: No gastrointestinal symptoms.  
Genitourinary: No genitourinary symptoms.  
Musculoskeletal: No musculoskeletal symptoms.  
Neurological: No neurological symptoms.  
Psychological: No psychological symptoms.  
Skin: No skin symptoms.  
Cardiovascular: Edema not present.  
  
** Physical Findings **  
- Vitals taken 2024 04:27 pm  
BP-Sitting L135/86 mmHg  
BP Cuff SizeLarge  
Pulse Rate-Jarvvrx041 bpm  
Respiration Rate18 per min  
Temp-Oral97.6 F  
Qoybpl92 in  
Vgcupq332 lbs  
Body Mass Index57.2 kg/m2  
Body Surface Area2.4 m2  
Oxygen Mbdnrbmjtm49 %  
  
Vital Signs:  
- Systolic blood pressure 130 - 139 mmHg.  
- Diastolic blood pressure 80-89 mmHg.  
General Appearance:  
- Awake. - Alert. - Well developed. - Well nourished. - In no acute distress.  
Lungs:  
- Respiration rhythm and depth was normal. - Clear to auscultation.  
Cardiovascular:  
Heart Rate And Rhythm: - Normal.  
Heart Sounds: - Normal.  
Murmurs: - No murmurs were heard.  
Musculoskeletal System:  
General/bilateral: - Normal movement of all extremities.  
Foot:  
General/bilateral: - Normal 10-g monofilament exam of right foot. - Normal 10-g   
monofilament exam of left foot.  
Skin:  
- General appearance was normal.  
Physician's Services:  
- Diabetic Foot Exam Abnormal  
  
** Tests **  
Blood Analysis:  
Blood Endocrine Laboratory Tests: Value  
Blood glucose level by fingerstick Non-fasting 122 mg/dl  
Laboratory-based Chemistry:  
Other Laboratory Tests:  
Screening for sexually transmitted infections was not performed.  
Imaging:  
Ophthalmoscopy:  
No apparent diabetic retinopathy.  
Optomap completed Both eyes (OU) and with physician / healthcare professional   
interpretation and report.  
  
** Assessment **  
- Z68.43 - Body mass index [BMI] 50.0-59.9, adult  
- Z23 - Encounter for immunization  
- K02.9 - Dental caries, unspecified  
- E11.9 - Type 2 diabetes mellitus without complications  
- F31.31 - Bipolar disorder, current episode depressed, mild  
  
** Previous Tests **  
Laboratory Studies:  
Renal Function:  
Glomerular filtration rate 2024 >60.  
  
** Therapy **  
- Patient has agreed to receive flu vaccine today.  
- Silver diamine fluoride 38% application by physician or other QHP 10 Teeth.  
- Immunization administration, by injection, one vaccine.  
  
** Vaccinations **  
- Fluarix 0.5mL for 6 months and older Dose #2 Status: Administered Date: 
2024  
  
- Received dose of Fluarix 0.5mL (6 months and up)  
  
** Counseling/Education **  
- Wishing to stop using electronic cigarettes/vaping  
- Discussed nutritional needs teach healthy choices including fruits and 
vegetables  
- Patient education about a proper diet  
- Discussed concerns about exercise: promote physical activity  
  
** Plan **  
StartCited- Attention-deficit hyperactivity disorder, unspecified type  
Intuniv 1 MG tablet take 1 tablet by mouth once daily at bedtime, 30 days, 5 
refills  
EndCited  
StartCited- Other  
Follow-up Appointment  
1 month med check  
lamoTRIgine 100 MG tablet take 1 tablet by mouth once daily, 30 days, 5 refills  
Prazosin HCl 1 MG capsule take 1 capsule by mouth twice daily, 30 days, 5 
refills  
Sertraline HCl 50 MG tablet take 1 tablet by mouth once daily, 30 days, 5 
refills  
EndCited  
** Care Team **  
- Doreen Cabrera CNP  
  
** Health Reminders **  
- Assess BMI satisfied 2024.  
- Assess Tobacco Use satisfied 2024.  
- Eye Exam satisfied 10/09/2024.  
- Follow Up Plan BMI Management satisfied 2024.  
- Foot Exam satisfied 2024.  
  
** User Defined 1 **  
Not planning a pregnancy in the next year.  
  
** Bottom of Document **  
[facility]  
  
  
Health [facility]09- Reason for referral (narrative)*   
  
                          Date         Encounter Description Provider     Reason  
 for Referral  
   
                          24      Established Patient Radha Young LSW R  
eferral To Mental Health Team  
   
                          22      Established Patient Haylie Jeff MOYA  
CC-S Referral To Mental Health  
 Team  
   
                          21     Medical New Patient Doreen Cabrera CNP Refe  
rral To Mental Health Team  
  
  
[facility]  
Work Phone: [phone]- Evaluation note  
  
Includes: Assessments for all patient encounters  
  
  
  
                                Findings        Encounter       Date  
   
                                                    Attention-deficit hyperactiv  
ity   
disorder                                 Established Patient with Leonie   
Short LISWS                             2024  
  
  
  
                                                    Last Documented On   
4 6:28PM ; [facility]  
  
  
  
                                                    Bipolar I disorder, most rec  
ent   
episode, depressed - mild                Established Patient with Leonie   
Short LISWS                             2024  
  
  
  
                                                    Last Documented On   
4 6:28PM ; [facility]  
  
  
  
                                        Post-traumatic stress disorder  Establ  
ished Patient with Leonie Short   
LISWS                                   2024  
  
  
  
                                                    Last Documented On   
4 6:28PM ; [facility]  
  
  
  
                                                    [E11.9 - Type 2 diabetes lobito  
litus   
without complications] type 2 diabetes   
mellitus                                Medical Established Patient with   
Doreen Cabrera CNP                        2024  
  
  
  
                                                    Last Documented On   
4 7:36PM ; [facility]  
  
  
  
                                                    [F41.1 - Generalized anxiety  
   
disorder] generalized anxiety   
disorder                                Medical Established Patient with   
Doreen Cabrera CNP                        2024  
  
  
  
                                                    Last Documented On   
4 7:36PM ; [facility]  
  
  
  
                                                    [I10 - Essential (primary)   
hypertension] essential hypertension    Medical Established Patient with   
Doreen Cabrera CNP                        2024  
  
  
  
                                                    Last Documented On   
4 7:36PM ; [facility]  
  
  
  
                                                    [N94.6 - Dysmenorrhea, unspe  
cified]   
dysmenorrhea                            Medical Established Patient with   
Doreen Cabrera CNP                        2024  
  
  
  
                                                    Last Documented On   
4 7:36PM ; [facility]  
  
  
  
                                                    [Z68.43 - Body mass index [B  
MI]   
50.0-59.9, adult] assessment of body   
mass index                              Medical Established Patient with   
Doreen Cabrera CNP                        2024  
  
  
  
                                                    Last Documented On   
4 7:36PM ; [facility]  
  
  
  
                                        Encounter for Immunization Medical Estab  
lished Patient with Doreen Cabrera   
CNP                                     2024  
  
  
  
                                                    Last Documented On   
4 7:36PM ; [facility]  
  
  
  
                                        Venipuncture was performed Medical Estab  
lished Patient with Doreen Cabrera   
CNP                                     2024  
  
  
  
                                                    Last Documented On   
4 7:36PM ; [facility]  
  
  
  
                                        Attention-deficit hyperactivity disorder  
  Established Patient with   
Leonie Short LISWS                     2024  
  
  
  
                                                    Last Documented On   
4 10:10AM ; [facility]  
  
  
  
                                                    Bipolar I disorder, most rec  
ent   
episode, depressed - mild                Established Patient with Leonie   
Short LISWS                             2024  
  
  
  
                                                    Last Documented On   
4 10:10AM ; [facility]  
  
  
  
                                        Post-traumatic stress disorder  Establ  
ished Patient with Leonie Short   
LISWS                                   2024  
  
  
  
                                                    Last Documented On   
4 10:10AM ; [facility]  
  
  
  
                                        [D64.9 - Anemia, unspecified] anemia Med  
ical Established Patient with   
Doreen Cabrera CNP                        2024  
  
  
  
                                                    Last Documented On   
4 6:51PM ; [facility]  
  
  
  
                                                    [Z68.43 - Body mass index [B  
MI]   
50.0-59.9, adult] assessment of body   
mass index                              Medical Established Patient with   
Doreen Cabrera CNP                        2024  
  
  
  
                                                    Last Documented On   
4 6:51PM ; [facility]  
  
  
  
                                                    Bipolar affective disorder,   
current   
episode depressed, mild                  Established Patient with Leonie   
Short LISWS                             2024  
  
  
  
                                                    Last Documented On   
4 5:16PM ; [facility]  
  
  
  
                                        Post-traumatic stress disorder  Establ  
ished Patient with Leonie Short   
LISWS                                   2024  
  
  
  
                                                    Last Documented On   
4 5:16PM ; [facility]  
  
  
  
                                                    Undifferentiated attention d  
eficit   
disorder                                 Established Patient with   
Leonie Short LISWS                     2024  
  
  
  
                                                    Last Documented On   
4 5:16PM ; [facility]  
  
  
  
                                        Visit for: screening for disorder  Est  
ablished Patient with Leonie   
Short LISWS                             2024  
  
  
  
                                                    Last Documented On   
4 5:16PM ; [facility]  
  
  
  
                                                    [Z68.43 - Body mass index [B  
MI]   
50.0-59.9, adult] assessment of body   
mass index                              Medical Established Patient with   
Doreen Cabrera CNP                        2024  
  
  
  
                                                    Last Documented On   
4 7:50PM ; [facility]  
  
  
  
                                        Attention-deficit hyperactivity disorder  
 Medical Established Patient with   
Doreen Cabrera CNP                        2024  
  
  
  
                                                    Last Documented On   
4 7:50PM ; [facility]  
  
  
  
                                                    Diabetes Risk Test Score was  
 three   
score 3/27/2024                         Medical Established Patient with Doreen Cabrera CNP                              2024  
  
  
  
                                                    Last Documented On   
4 7:50PM ; [facility]  
  
  
  
                                        Visit for: screening for STD Medical Est  
ablished Patient with Doreen Cabrera   
CNP                                     2024  
  
  
  
                                                    Last Documented On   
4 7:50PM ; [facility]  
  
  
  
                                                    [Z68.32 - Body mass index [B  
MI]   
32.0-32.9, adult] assessment of body   
mass index                              Medical Established Patient with   
Doreen Cabrera CNP                        2023  
  
  
  
                                                    Last Documented On   
3 6:01PM ; [facility]  
  
  
  
                                        Borderline personality disorder Medical   
Established Patient with Doreen Cabrera CNP                              2023  
  
  
  
                                                    Last Documented On   
3 6:01PM ; [facility]  
  
  
  
                                        Screening for diabetes mellitus Medical   
Established Patient with Doreen Cabrera CNP                              2023  
  
  
  
                                                    Last Documented On   
3 6:01PM ; [facility]  
  
  
  
                                        Assessment of body mass index Medical Es  
tablished Patient with Doreen Cabrera CNP                              10/21/2022  
  
  
  
                                                    Last Documented On 10/21/202  
2 2:45PM ; [facility]  
  
  
  
                                                    Bipolar affective disorder,   
current   
episode manic                            Tele[facility] with Haylienicanor Aguilar LPCC-S                        2022  
  
  
  
                                                    Last Documented On   
2 3:22PM ; [facility]  
  
  
  
                                                    Bipolar affective disorder,   
current   
episode manic                            Established Patient with Haylienicanor Martinerson LPCC-S                        2022  
  
  
  
                                                    Last Documented On   
2 2:28PM ; [facility]  
  
  
  
                                                    Bipolar affective disorder,   
current   
episode depressed, severe with   
psychosis                                Tele[facility] with Haylienicanor Martinerson LPCC-S                        2022  
  
  
  
                                                    Last Documented On   
2 3:29PM ; [facility]  
  
  
  
                                                    Assessment of body mass inde  
x [Body   
mass index [BMI] 50.0-59.9, adult]      Open Access - Established with Julieth   
Aliya CNP                               2022  
  
  
  
                                                    Last Documented On   
2 9:36AM ; [facility]  
  
  
  
                                                    Bipolar I disorder, most rec  
ent   
episode, manic                          Open Access - Established with Julieth   
Aliya CNP                               2022  
  
  
  
                                                    Last Documented On   
2 9:36AM ; [facility]  
  
  
  
                                        Post-traumatic stress disorder BH Establ  
ished Patient with Haylienicanor MartinAguilar   
LPCC-S                                  2022  
  
  
  
                                                    Last Documented On   
2 3:20PM ; [facility]  
  
  
  
                                No cough        Medical Established Patient with  
 Doreen Deborah CNP 2022  
  
  
  
                                                    Last Documented On   
2 4:04PM ; [facility]  
  
  
  
                                                    Z68.43 - Body mass index [BM  
I]   
50.0-59.9, adult                        Medical Established Patient with   
Doreen Deborah CNP                        2022  
  
  
  
                                                    Last Documented On   
2 4:04PM ; [facility]  
  
  
  
                                                    Borderline personality disor  
danny Pt   
reported hx of sx/dx                    BH Established Patient with Haylienicanor Martinerson LPCC-S                        2022  
  
  
  
                                                    Last Documented On   
2 4:08PM ; [facility]  
  
  
  
                                                    Assessment of body mass inde  
x [Body   
mass index [BMI] 50.0-59.9, adult]      Open Access - Established with Julieth   
Aliya CNP                               2022  
  
  
  
                                                    Last Documented On   
2 7:41PM ; [facility]  
  
  
  
                                                    Diabetes Risk Test Score was  
 three   
score 2022                         Open Access - Established with Julieth   
Aliya CNP                               2022  
  
  
  
                                                    Last Documented On   
2 7:41PM ; [facility]  
  
  
  
                                                    Bipolar I disorder, most rec  
ent   
episode, manic                          BH Established Patient with Avis   
Felder LPCC-S                          2021  
  
  
  
                                                    Last Documented On   
1 1:35AM ; [facility]  
  
  
  
                                        Borderline personality disorder BH Estab  
lished Patient with Avis   
Felder LPCC-S                          2021  
  
  
  
                                                    Last Documented On   
1 1:35AM ; [facility]  
  
  
  
                                        Post-traumatic stress disorder BH Establ  
ished Patient with Avis   
Felder LPCC-S                          2021  
  
  
  
                                                    Last Documented On   
1 1:35AM ; [facility]  
  
  
  
                                                    Assessment of visit for: bhargavi myles for   
human immunodeficiency virus            Medical Established Patient with   
Doreen Deborah CNP                        2021  
  
  
  
                                                    Last Documented On   
1 2:56PM ; [facility]  
  
  
  
                                Nicotine dependence Medical Established Patient   
with Doreen Deborah CNP 2021  
  
  
  
                                                    Last Documented On   
1 2:56PM ; [facility]  
  
  
  
                                Tachycardia     Medical Established Patient with  
 Doreen Deborah CNP 2021  
  
  
  
                                                    Last Documented On   
1 2:56PM ; [facility]  
  
  
  
                                                    Z68.43 - Body mass index [BM  
I]   
50.0-59.9, adult                        Medical Established Patient with   
Doreen Deborah CNP                        2021  
  
  
  
                                                    Last Documented On   
1 2:56PM ; [facility]  
  
  
  
                                                    Bipolar I disorder, most rec  
ent   
episode, manic                           Established Patient with Leonie   
Short LISWS                             2021  
  
  
  
                                                    Last Documented On   
1 10:17AM ; [facility]  
  
  
  
                                                    Borderline personality disor  
danny per   
patient reported history                 Established Patient with Leonie   
Short LISWS                             2021  
  
  
  
                                                    Last Documented On   
1 10:17AM ; [facility]  
  
  
  
                                Nicotine dependence  Established Patient with   
Leonie Short LISWS 2021  
  
  
  
                                                    Last Documented On   
1 10:17AM ; [facility]  
  
  
  
                                        Post-traumatic stress disorder  Establ  
ished Patient with Leonie Short   
LISWS                                   2021  
  
  
  
                                                    Last Documented On   
1 10:17AM ; [facility]  
  
  
  
                                Body mass index Medical Established Patient with  
 Doreen Deborah CNP 2021  
  
  
  
                                                    Last Documented On   
1 5:23PM ; [facility]  
  
  
  
                                Morbid obesity  Medical Established Patient with  
 Doreen Deborah CNP 2021  
  
  
  
                                                    Last Documented On   
1 5:23PM ; [facility]  
  
  
  
                                        Nicotine dependence uncomplicated Medica  
l Established Patient with Doreen   
Deborah CNP                              2021  
  
  
  
                                                    Last Documented On   
1 5:23PM ; [facility]  
  
  
  
                                                    Z68.42 - Body mass index [BM  
I]   
45.0-49.9, adult                        Medical Established Patient with   
Doreen Deborah CNP                        2021  
  
  
  
                                                    Last Documented On   
1 5:23PM ; [facility]  
  
  
  
                                Episodic mood disorders On Call with Pamela Velezammy edwards CNP 2021  
  
  
  
                                                    Last Documented On   
1 7:49AM ; [facility]  
  
  
  
                                                    Mood disorders, NOS per tabatha  
ent   
reported history                         Established Patient with Leonie   
Short LISWS                             2021  
  
  
  
                                                    Last Documented On   
1 7:15PM ; [facility]  
  
  
  
                                        Post-traumatic stress disorder  Establ  
ished Patient with Leonie Short   
LISWS                                   2021  
  
  
  
                                                    Last Documented On   
1 7:15PM ; [facility]  
  
  
  
                                Morbid obesity  Medical Established Patient with  
 Doreen Deborah CNP 2021  
  
  
  
                                                    Last Documented On   
1 12:31PM ; [facility]  
  
  
  
                                Otitis externa  Medical Established Patient with  
 Doreen Deborah CNP 2021  
  
  
  
                                                    Last Documented On   
1 12:31PM ; [facility]  
  
  
  
                                                    Z68.42 - Body mass index [BM  
I]   
45.0-49.9, adult                        Medical Established Patient with   
Doreen Deborah CNP                        2021  
  
  
  
                                                    Last Documented On   
1 12:31PM ; [facility]  
  
  
  
                                        Post-traumatic stress disorder  Establ  
ished Patient with Leonie Short   
LISWS                                   06/10/2021  
  
  
  
                                                    Last Documented On 06/10/202  
1 10:05PM ; [facility]  
  
  
  
                                Morbid obesity  Medical Established Patient with  
 Doreen Deborah CNP 06/10/2021  
  
  
  
                                                    Last Documented On 06/10/202  
1 4:45PM ; [facility]  
  
  
  
                                                    Z68.42 - Body mass index [BM  
I]   
45.0-49.9, adult                        Medical Established Patient with   
Doreen Deborah CNP                        06/10/2021  
  
  
  
                                                    Last Documented On 06/10/202  
1 4:45PM ; [facility]  
  
  
  
                                        Post-traumatic stress disorder  Establ  
ished Patient with Leonie Short   
LISWS                                   2021  
  
  
  
                                                    Last Documented On   
1 11:59AM ; [facility]  
  
  
  
                                                    Assessment of visit for: bhargavi myles for   
human immunodeficiency virus            Medical New Patient with Doreen   
Deborah CNP                              2021  
  
  
  
                                                    Last Documented On   
1 4:06PM ; [facility]  
  
  
  
                                                    Diabetes Risk Test Score was  
 one score   
2021                               Medical New Patient with Doreen Cabrera CNP                              2021  
  
  
  
                                                    Last Documented On   
1 4:06PM ; [facility]  
  
  
  
                                Hypertension    Medical New Patient with Doreenpaola esposito CNP 2021  
  
  
  
                                                    Last Documented On   
1 4:06PM ; [facility]  
  
  
  
                                Morbid obesity  Medical New Patient with Doreen DAVID esposito CNP 2021  
  
  
  
                                                    Last Documented On   
1 4:06PM ; [facility]  
  
  
  
                                Post-traumatic stress disorder Medical New Patie  
nt with Doreenpaola Mccormacken CNP   
2021  
  
  
  
                                                    Last Documented On   
1 4:06PM ; [facility]  
  
  
  
                                                    Z68.42 - Body mass index [BM  
I]   
45.0-49.9, adult                        Medical New Patient with Doreen Cabrera CNP                              2021  
  
  
  
                                                    Last Documented On   
1 4:06PM ; [facility]  
Work Phone: [phone]- Evaluation note  
  
Includes: Assessments for all patient encounters  
  
  
  
                                Findings        Encounter       Date  
   
                                                    Attention-deficit hyperactiv  
ity   
disorder                                 Established Patient with Leonie   
Short LISWS                             2024  
  
  
  
                                                    Last Documented On   
4 3:28PM ; [facility]  
  
  
  
                                                    Bipolar I disorder, most rec  
ent   
episode, depressed - mild                Established Patient with Leonie   
Short LISWS                             2024  
  
  
  
                                                    Last Documented On   
4 3:28PM ; [facility]  
  
  
  
                                        Post-traumatic stress disorder  Establ  
ished Patient with Leonie Short   
LISWS                                   2024  
  
  
  
                                                    Last Documented On   
4 3:28PM ; [facility]  
  
  
  
                                                    [E11.9 - Type 2 diabetes lobito  
litus   
without complications] type 2 diabetes   
mellitus                                Medical Established Patient with   
Doreenpaola Mccormacken CNP                        2024  
  
  
  
                                                    Last Documented On   
4 7:36PM ; [facility]  
  
  
  
                                                    [F41.1 - Generalized anxiety  
   
disorder] generalized anxiety   
disorder                                Medical Established Patient with   
Doreen Deborah CNP                        2024  
  
  
  
                                                    Last Documented On   
4 7:36PM ; [facility]  
  
  
  
                                                    [I10 - Essential (primary)   
hypertension] essential hypertension    Medical Established Patient with   
Doreen Cabrera CNP                        2024  
  
  
  
                                                    Last Documented On   
4 7:36PM ; [facility]  
  
  
  
                                                    [N94.6 - Dysmenorrhea, unspe  
cified]   
dysmenorrhea                            Medical Established Patient with   
Doreen Cabrera CNP                        2024  
  
  
  
                                                    Last Documented On   
4 7:36PM ; [facility]  
  
  
  
                                                    [Z68.43 - Body mass index [B  
MI]   
50.0-59.9, adult] assessment of body   
mass index                              Medical Established Patient with   
Doreen Cabrera CNP                        2024  
  
  
  
                                                    Last Documented On   
4 7:36PM ; [facility]  
  
  
  
                                        Encounter for Immunization Medical Estab  
lished Patient with Doreen Cabrera   
CNP                                     2024  
  
  
  
                                                    Last Documented On   
4 7:36PM ; [facility]  
  
  
  
                                        Venipuncture was performed Medical Estab  
lished Patient with Doreen Cabrera   
CNP                                     2024  
  
  
  
                                                    Last Documented On   
4 7:36PM ; [facility]  
  
  
  
                                        Attention-deficit hyperactivity disorder  
  Established Patient with   
Leonie Short LISWS                     2024  
  
  
  
                                                    Last Documented On   
4 10:10AM ; [facility]  
  
  
  
                                                    Bipolar I disorder, most rec  
ent   
episode, depressed - mild               BH Established Patient with Leonie   
Short LISWS                             2024  
  
  
  
                                                    Last Documented On   
4 10:10AM ; [facility]  
  
  
  
                                        Post-traumatic stress disorder  Establ  
ished Patient with Leonie Short   
LISWS                                   2024  
  
  
  
                                                    Last Documented On   
4 10:10AM ; [facility]  
  
  
  
                                        [D64.9 - Anemia, unspecified] anemia Med  
ical Established Patient with   
Doreen Cabrera CNP                        2024  
  
  
  
                                                    Last Documented On   
4 6:51PM ; [facility]  
  
  
  
                                                    [Z68.43 - Body mass index [B  
MI]   
50.0-59.9, adult] assessment of body   
mass index                              Medical Established Patient with   
Doreen Cabrera CNP                        2024  
  
  
  
                                                    Last Documented On   
4 6:51PM ; [facility]  
  
  
  
                                                    Bipolar affective disorder,   
current   
episode depressed, mild                  Established Patient with Leonie   
Short LISWS                             2024  
  
  
  
                                                    Last Documented On   
4 5:16PM ; [facility]  
  
  
  
                                        Post-traumatic stress disorder BH Establ  
ished Patient with Leonie Short   
LISWS                                   2024  
  
  
  
                                                    Last Documented On   
4 5:16PM ; [facility]  
  
  
  
                                                    Undifferentiated attention d  
eficit   
disorder                                 Established Patient with   
Leonie Short LISWS                     2024  
  
  
  
                                                    Last Documented On   
4 5:16PM ; [facility]  
  
  
  
                                        Visit for: screening for disorder BH Est  
ablished Patient with Leonie   
Short LISWS                             2024  
  
  
  
                                                    Last Documented On   
4 5:16PM ; [facility]  
  
  
  
                                                    [Z68.43 - Body mass index [B  
MI]   
50.0-59.9, adult] assessment of body   
mass index                              Medical Established Patient with   
Doreen Cabrera CNP                        2024  
  
  
  
                                                    Last Documented On   
4 7:50PM ; [facility]  
  
  
  
                                        Attention-deficit hyperactivity disorder  
 Medical Established Patient with   
Doreenpaola Cabrera CNP                        2024  
  
  
  
                                                    Last Documented On   
4 7:50PM ; [facility]  
  
  
  
                                                    Diabetes Risk Test Score was  
 three   
score 3/27/2024                         Medical Established Patient with Doreenpaola Mccormacken CNP                              2024  
  
  
  
                                                    Last Documented On   
4 7:50PM ; [facility]  
  
  
  
                                        Visit for: screening for STD Medical Est  
ablished Patient with Doreenpaola Cabrera   
CNP                                     2024  
  
  
  
                                                    Last Documented On   
4 7:50PM ; [facility]  
  
  
  
                                                    [Z68.32 - Body mass index [B  
MI]   
32.0-32.9, adult] assessment of body   
mass index                              Medical Established Patient with   
Doreenpaola Cabrera CNP                        2023  
  
  
  
                                                    Last Documented On   
3 6:01PM ; [facility]  
  
  
  
                                        Borderline personality disorder Medical   
Established Patient with Doreen   
Deborah CNP                              2023  
  
  
  
                                                    Last Documented On   
3 6:01PM ; [facility]  
  
  
  
                                        Screening for diabetes mellitus Medical   
Established Patient with Doreen   
Deborah CNP                              2023  
  
  
  
                                                    Last Documented On   
3 6:01PM ; [facility]  
  
  
  
                                        Assessment of body mass index Medical Es  
tablished Patient with Doreenpaloa Mccormacken CNP                              10/21/2022  
  
  
  
                                                    Last Documented On 10/21/202  
2 2:45PM ; [facility]  
  
  
  
                                                    Bipolar affective disorder,   
current   
episode manic                            [facility] with Haylienicanor Aguilar LPCC-S                        2022  
  
  
  
                                                    Last Documented On   
2 3:22PM ; [facility]  
  
  
  
                                                    Bipolar affective disorder,   
current   
episode manic                            Established Patient with Haylienicanor Aguilar LPCC-S                        2022  
  
  
  
                                                    Last Documented On   
2 2:28PM ; [facility]  
  
  
  
                                                    Bipolar affective disorder,   
current   
episode depressed, severe with   
psychosis                                [facility] with Haylienicanor Aguilar LPCC-S                        2022  
  
  
  
                                                    Last Documented On   
2 3:29PM ; [facility]  
  
  
  
                                                    Assessment of body mass inde  
x [Body   
mass index [BMI] 50.0-59.9, adult]      [facility] - Established with Julieth   
Aliya CNP                               2022  
  
  
  
                                                    Last Documented On   
2 9:36AM ; [facility]  
  
  
  
                                                    Bipolar I disorder, most rec  
ent   
episode, manic                          [facility] - Established with Julieth   
Aliya CNP                               2022  
  
  
  
                                                    Last Documented On   
2 9:36AM ; [facility]  
  
  
  
                                        Post-traumatic stress disorder  Establ  
ished Patient with Haylienicanor Aguilar   
LPCC-S                                  2022  
  
  
  
                                                    Last Documented On   
2 3:20PM ; [facility]  
  
  
  
                                No cough        Medical Established Patient with  
 Doreen Deborah CNP 2022  
  
  
  
                                                    Last Documented On   
2 4:04PM ; [facility]  
  
  
  
                                                    Z68.43 - Body mass index [BM  
I]   
50.0-59.9, adult                        Medical Established Patient with   
Doreen Deborah CNP                        2022  
  
  
  
                                                    Last Documented On   
2 4:04PM ; [facility]  
  
  
  
                                                    Borderline personality disor  
danny Pt   
reported hx of sx/dx                     Established Patient with Haylienicanor Aguilar LPCC-S                        2022  
  
  
  
                                                    Last Documented On   
2 4:08PM ; [facility]  
  
  
  
                                                    Assessment of body mass inde  
x [Body   
mass index [BMI] 50.0-59.9, adult]      [facility] - Established with Julieth   
Aliya CNP                               2022  
  
  
  
                                                    Last Documented On   
2 7:41PM ; [facility]  
  
  
  
                                                    Diabetes Risk Test Score was  
 three   
score 2022                         [facility] - Established with Julieth   
Aliya CNP                               2022  
  
  
  
                                                    Last Documented On   
2 7:41PM ; [facility]  
  
  
  
                                                    Bipolar I disorder, most rec  
ent   
episode, manic                           Established Patient with Avis   
Felder LPCC-S                          2021  
  
  
  
                                                    Last Documented On   
1 1:35AM ; [facility]  
  
  
  
                                        Borderline personality disorder  Estab  
lished Patient with Avis   
Felder LPCC-S                          2021  
  
  
  
                                                    Last Documented On   
1 1:35AM ; [facility]  
  
  
  
                                        Post-traumatic stress disorder  Establ  
ished Patient with Avis   
Felder LPCC-S                          2021  
  
  
  
                                                    Last Documented On   
1 1:35AM ; [facility]  
  
  
  
                                                    Assessment of visit for: bhargavi myles for   
human immunodeficiency virus            Medical Established Patient with   
Doreen Deborah CNP                        2021  
  
  
  
                                                    Last Documented On   
1 2:56PM ; [facility]  
  
  
  
                                Nicotine dependence Medical Established Patient   
with Doreen Deborah CNP 2021  
  
  
  
                                                    Last Documented On   
1 2:56PM ; [facility]  
  
  
  
                                Tachycardia     Medical Established Patient with  
 Doreen Deborah CNP 2021  
  
  
  
                                                    Last Documented On   
1 2:56PM ; [facility]  
  
  
  
                                                    Z68.43 - Body mass index [BM  
I]   
50.0-59.9, adult                        Medical Established Patient with   
Doreenpaola Cabrera CNP                        2021  
  
  
  
                                                    Last Documented On   
1 2:56PM ; [facility]  
  
  
  
                                                    Bipolar I disorder, most rec  
ent   
episode, manic                           Established Patient with Leonie   
Short LISWS                             2021  
  
  
  
                                                    Last Documented On   
1 10:17AM ; [facility]  
  
  
  
                                                    Borderline personality disor  
danny per   
patient reported history                 Established Patient with Leonie   
Short LISWS                             2021  
  
  
  
                                                    Last Documented On   
1 10:17AM ; [facility]  
  
  
  
                                Nicotine dependence  Established Patient with   
Leonie Short LISWS 2021  
  
  
  
                                                    Last Documented On   
1 10:17AM ; [facility]  
  
  
  
                                        Post-traumatic stress disorder  Establ  
ished Patient with Leonie Short   
LISWS                                   2021  
  
  
  
                                                    Last Documented On   
1 10:17AM ; [facility]  
  
  
  
                                Body mass index Medical Established Patient with  
 Doreen Deborah CNP 2021  
  
  
  
                                                    Last Documented On   
1 5:23PM ; [facility]  
  
  
  
                                Morbid obesity  Medical Established Patient with  
 Doreen Deborah CNP 2021  
  
  
  
                                                    Last Documented On   
1 5:23PM ; [facility]  
  
  
  
                                        Nicotine dependence uncomplicated Medica  
l Established Patient with Doreen   
Deborah CNP                              2021  
  
  
  
                                                    Last Documented On   
1 5:23PM ; [facility]  
  
  
  
                                                    Z68.42 - Body mass index [BM  
I]   
45.0-49.9, adult                        Medical Established Patient with   
Doreen Deborah CNP                        2021  
  
  
  
                                                    Last Documented On   
1 5:23PM ; [facility]  
  
  
  
                                Episodic mood disorders On Call with Pamela edwards CNP 2021  
  
  
  
                                                    Last Documented On   
1 7:49AM ; [facility]  
  
  
  
                                                    Mood disorders, NOS per tabatha  
ent   
reported history                         Established Patient with Leonie   
Short LISWS                             2021  
  
  
  
                                                    Last Documented On   
1 7:15PM ; [facility]  
  
  
  
                                        Post-traumatic stress disorder  Establ  
ished Patient with Leonie Short   
LISWS                                   2021  
  
  
  
                                                    Last Documented On   
1 7:15PM ; [facility]  
  
  
  
                                Morbid obesity  Medical Established Patient with  
 Doreen Deborah CNP 2021  
  
  
  
                                                    Last Documented On   
1 12:31PM ; [facility]  
  
  
  
                                Otitis externa  Medical Established Patient with  
 Doreen Deborah CNP 2021  
  
  
  
                                                    Last Documented On   
1 12:31PM ; [facility]  
  
  
  
                                                    Z68.42 - Body mass index [BM  
I]   
45.0-49.9, adult                        Medical Established Patient with   
Doreen Deborah CNP                        2021  
  
  
  
                                                    Last Documented On   
1 12:31PM ; [facility]  
  
  
  
                                        Post-traumatic stress disorder  Establ  
ished Patient with Leonie Short   
LISWS                                   06/10/2021  
  
  
  
                                                    Last Documented On 06/10/202  
1 10:05PM ; [facility]  
  
  
  
                                Morbid obesity  Medical Established Patient with  
 Doreen Cabrera CNP 06/10/2021  
  
  
  
                                                    Last Documented On 06/10/202  
1 4:45PM ; [facility]  
  
  
  
                                                    Z68.42 - Body mass index [BM  
I]   
45.0-49.9, adult                        Medical Established Patient with   
Doreenpaola Cabrera CNP                        06/10/2021  
  
  
  
                                                    Last Documented On 06/10/202  
1 4:45PM ; [facility]  
  
  
  
                                        Post-traumatic stress disorder BH Establ  
ished Patient with Leonie Short   
LISWS                                   2021  
  
  
  
                                                    Last Documented On   
1 11:59AM ; [facility]  
  
  
  
                                                    Assessment of visit for: bhargavi myles for   
human immunodeficiency virus            Medical New Patient with Doreenpaola Cabrera CNP                              2021  
  
  
  
                                                    Last Documented On   
1 4:06PM ; [facility]  
  
  
  
                                                    Diabetes Risk Test Score was  
 one score   
2021                               Medical New Patient with Doreen Cabrera CNP                              2021  
  
  
  
                                                    Last Documented On   
1 4:06PM ; [facility]  
  
  
  
                                Hypertension    Medical New Patient with Doreen DAVID esposito CNP 2021  
  
  
  
                                                    Last Documented On   
1 4:06PM ; [facility]  
  
  
  
                                Morbid obesity  Medical New Patient with Doreen DAVID esposito CNP 2021  
  
  
  
                                                    Last Documented On   
1 4:06PM ; [facility]  
  
  
  
                                Post-traumatic stress disorder Medical New Patie  
nt with Doreenpaola Cabrera CNP   
2021  
  
  
  
                                                    Last Documented On   
1 4:06PM ; [facility]  
  
  
  
                                                    Z68.42 - Body mass index [BM  
I]   
45.0-49.9, adult                        Medical New Patient with Doreenpaola Cabrera CNP                              2021  
  
  
  
                                                    Last Documented On   
1 4:06PM ; [facility]  
Work Phone: [phone]- Progress note*   
  
Progress note  
  
  
  
                                Date            Encounter       Last Documented   
by  
   
                                2024      Medical Established Patient Last  
 documented on 2024; 7:36 PM,   
Doreen Cabrera CNP; [facility]  
  
  
  
                                                      
  
  
** Active Problems & Conditions **  
- F90.9 - Attention-deficit Hyperactivity Disorder  
- F31.31 - Bipolar I Disorder, Most Recent Episode, Depressed Mild  
- E11.9 - Diabetes Mellitus Type 2 Without Complication  
- N94.6 - Dysmenorrhea  
- F43.10 - Post-traumatic Stress Disorder  
  
** Chief Complaint **  
The Chief Complaint is: F/u on mental health. Patient stopped metformin and has   
gained weight. Patient has appointment scheduled with Dr. Patterson. Patient has 
been   
menstruating since stopping the metformin 4-5 months. Does not think buspirone 
is   
working because of increased anxiety.  
Patient did not get blood work completed.  
  
** Referred Here **  
Not referred by urgent care clinic and not the emergency room. No prior 
encounters.  
- Data to be reviewed: no clinical lab tests  
  
** History of Present Illness **  
Linnette Beckham is a 24 year old female.  
- Allergy list reviewed - Reviewed Medications - Medication list reviewed  
- Date of last menstruation patient unsure of date - Pregnancy test - would not 
like   
a pregnancy test  
Presents with sig other , anxiety is  not good  and has been bleeding ever since
   
stopping metformin r/t intolerance 3 months ago , has appt with gyn but not til   
october agreeable to progesterone ocp to help get bleeding to stop  
  
** Current Medication **  
- ARIPiprazole 5 MG Oral Tablet take 1 tablet by mouth once daily, 30 days, 5 
refills  
- Intuniv 1 MG Oral Tablet Extended Release 24 Hour take 1 tablet by mouth once 
daily   
at bedtime, 30 days, 5 refills  
- lamoTRIgine 100 MG Oral Tablet take 1 tablet by mouth once daily, 30 days, 5   
refills  
- MiraLax 17 GM/SCOOP Oral Powder mix 1 scoop with 8 ounces once daily, 30 days,
 2   
refills  
- Narcan 4 MG/0.1ML Nasal Liquid spray in nostril for symptoms of overdose , may
   
repeat in 2 minutes if symptoms persist, 1 days, 0 refills  
- Prazosin HCl 1 MG Oral Capsule take 1 capsule by mouth twice daily, 30 days, 5
   
refills  
- Sertraline HCl 50 MG Oral Tablet take 1 tablet by mouth once daily, 30 days, 5
   
refills  
- traZODone HCl 100 MG Oral Tablet take 1 tablet by mouth twice daily, 30 days, 
5   
refills  
  
** Past Medical/Surgical History **  
Reported:  
Has sex without a condom.  
Medical: No previous hospitalizations. Chronic illness and Sexually transmitted   
infection Partners sexually transmitted infection status known.  
Immunization History: Recent immunization for flu.  
Exposure: Exposure to COVID-19.  
Pregnancy: Previously pregnant 1 time(s) and para having 0 live birth(s). Not   
planning a pregnancy in the next year.  
Diagnoses:  
Polycystic Ovarian Syndrome (PCOS).  
Migraine headache.  
Psychiatric disorders biopolar disorder  
Anxiety disorder  
POTS.  
Procedural:  
- Insertion of ear pressure equalization tubes in both ears  
Surgical:  
- Tonsillectomy  
- Tonsillectomy with adenoidectomy  
  
** Social History **  
Environmental Exposure: Secondhand cigarette smoke exposure.  
Behavioral: Not a current tobacco user.  
Tobacco use: Using electronic cigarettes/vaping.  
Alcohol: A social drinker.  
Drug Use: Not using drugs denied by patient.  
Sexual: Sexually active age of sexual partner is _____ years old 21, sexual   
orientation Lesbian or David, gender identity Other, and birth control is being   
practiced Not Using - In Same Sex Relationship.  
  
** Allergies **  
- Abilify Reaction: groggy  
- Augmentin Reaction: Shock  
- Metformin Reaction: Nausea, Vomiting  
- NO KNOWN ENVIRONMENTAL ALLERGIES  
- NO KNOWN FOOD ALLERGIES  
- Sertraline Reaction: slow/groggy  
- Trazodone Hydrochloride Reaction: Panic attack  
  
** Family History **  
Paternal:  
Systemic hypertension  
Oncologic disorder  
Maternal:  
Systemic hypertension  
Epilepsy and recurrent seizures  
Psychiatric disorders  
Oncologic disorder  
Fraternal:  
Psychiatric disorders  
  
** Review Of Systems **  
Head: No head symptoms.  
Neck: No neck symptoms.  
Eyes: No eye symptoms.  
Otolaryngeal: No ear symptoms, no nasal symptoms, no nose and sinus finding, no   
throat symptoms, no oral cavity symptoms, and no jaw symptoms.  
Breasts: No breast symptoms.  
Cardiovascular: No cardiovascular symptoms and no chest pain or discomfort.  
Pulmonary: No pulmonary symptoms.  
Gastrointestinal: No gastrointestinal symptoms.  
Genitourinary: No genitourinary symptoms. Vaginal discharge has been on period x
 3   
months , not heavy and no clotting.  
Musculoskeletal: No musculoskeletal symptoms.  
Neurological: No neurological symptoms.  
Psychological: Anxiety, depression, and sleep.  
Skin: No skin symptoms.  
Cardiovascular: Edema not present.  
  
** Physical Findings **  
- Vitals taken 2024 04:45 pm  
BP-Sitting R151/98 mmHg  
BP Cuff SizeLarge  
Pulse Rate-Lbyowed915 bpm  
Eiunzh80 in  
Edzdnh506 lbs 9.6 oz  
Body Mass Index57.9 kg/m2  
Body Surface Area2.4 m2  
Oxygen Hwmhxrrrvz02 %  
  
- Vitals taken 2024 05:21 pm  
BP-Sitting R154/86 mmHg  
BP Cuff SizeLarge  
  
- Vitals taken 2024 05:46 pm  
manual large  
BP-Sitting R144/84 mmHg  
BP Cuff SizeLarge  
  
Vital Signs:  
- Systolic Blood Pressure > or = 140 mmHg.  
- Diastolic blood pressure 80-89 mmHg.  
General Appearance:  
- Awake. - Alert. - Well developed. - Well nourished. - In no acute distress.  
Lungs:  
- Respiration rhythm and depth was normal. - Clear to auscultation.  
Cardiovascular:  
Heart Rate And Rhythm: - Normal.  
Heart Sounds: - Normal.  
Murmurs: - No murmurs were heard.  
Abdomen:  
Visual Inspection: - Abdomen was normal on visual inspection.  
Musculoskeletal System:  
General/bilateral: - Normal movement of all extremities.  
Skin:  
- General appearance was normal.  
  
** Tests **  
Blood Analysis:  
Hemoglobin Studies: ValueDate  
Blood hemoglobin A1c 7.1%2024  
Blood Endocrine Laboratory Tests: Value  
Blood glucose level by fingerstick Non-fasting 112 mg/dl  
Laboratory-based Chemistry:  
Other Laboratory Tests:  
Screening for sexually transmitted infections was not performed.  
Laboratory Studies:  
Vascular Procedures:  
Right Antecubital Space.  
Left Antecubital Space.  
  
** Assessment **  
- Z68.43 - Body mass index [BMI] 50.0-59.9, adult  
- Z01.812 - Encounter for preprocedural laboratory examination  
- Z23 - Encounter for immunization  
- I10 - Essential (primary) hypertension  
- N94.6 - Dysmenorrhea, unspecified  
- E11.9 - Type 2 diabetes mellitus without complications  
- F41.1 - Generalized anxiety disorder  
  
** Therapy **  
- Immunization administration age 18 or younger, with counseling, first / only   
vaccine component and age 18 or younger, with counseling, each additional 
vaccine   
component.  
  
** Vaccinations **  
- HPV-Gardasil 9 Dose #1 Status: Administered Date: 2024  
- PCV (Prevnar-20) Dose #1 Status: Administered Date: 2024  
  
- Received dose of human papilloma virus vaccine  
- Received dose of pneumococcal conjugate vaccine, 20-valent, IM use  
  
** Counseling/Education **  
- No not wishing to stop using electronic cigarettes/vaping  
- Discussed nutritional needs teach healthy choices including fruits and 
vegetables  
- Patient education about a proper diet  
- Referred Patient to a Diabetes Self-Management Program  
- Discussed concerns about exercise: promote physical activity  
  
** Plan **  
StartCited- Dysmenorrhea, unspecified  
Outside Diagn Tests/Ultrasound: US Transvaginal ([id number])  
Instructions: [place]  
Slynd 4 MG tablet take 1 tablet by mouth at the same time everyday to help 
regulate   
your period, 28 days, 2 refills  
EndCited  
StartCited- Generalized anxiety disorder  
busPIRone HCl 10 MG tablet take 1 tablet by mouth twice daily (d/c 5 mg rx), 30 
days,   
5 refills  
EndCited  
StartCited- Other  
Follow-up Appointment  
1 month med f/u and 2nd hpv  
EndCited  
StartCited- Type 2 diabetes mellitus without complications  
Januvia 50 MG tablet take 1 tablet by mouth once daily, 30 days, 5 refills  
Lisinopril 5 MG tablet take 1 tablet by mouth once daily, 30 days, 5 refills  
Atorvastatin Calcium 20 MG tablet take 1 tablet by mouth once daily at bedtime, 
30   
days, 5 refills  
Blood Glucose System Sriram Kit use to check sugars once daily (use what is 
covered), 30   
days, 0 refills  
Blood Glucose Test strip use to cehck sugars (dispense what is covered), 90 
days, 3   
refills  
Alcohol Pads 70% pad use to cleanse skin prior to checking blood sugars, 90 
days, 3   
refills  
CareSens Lancets each use to check sugars once daily (dispense what is covered),
 90   
days, 3 refills  
EndCited  
Modify diet , limit starchy carbs such as breads / pastas / sweets.  
  
** Other **  
- Patient tolerated venipuncture well; - Number of attempts for venipuncture two  
  
** Practice Management **  
Hemoglobin A1c level >=7.0 and < 8.0%.  
  
** Care Team **  
- Doreen Cabrera CNP  
  
** Health Reminders **  
- Assess BMI satisfied 2024.  
- Assess Tobacco Use satisfied 2024.  
- Follow Up Plan BMI Management satisfied 2024.  
- Hemoglobin A1c satisfied 2024.  
- Statin Therapy Needed satisfied 2024.  
  
** User Defined 1 **  
Not planning a pregnancy in the next year.  
  
  
[facility]- Progress note*   
  
Progress note  
  
  
  
                                Date            Encounter       Last Documented   
by  
   
                                2024       Established Patient Last docu  
mented on 2024; 3:28 PM,   
Leonie HUTCHINSON; [facility]  
  
  
  
                                                      
  
  
** Active Problems & Conditions **  
- F90.9 - Attention-deficit Hyperactivity Disorder  
- F31.31 - Bipolar I Disorder, Most Recent Episode, Depressed Mild  
- E11.9 - Diabetes Mellitus Type 2 Without Complication  
- N94.6 - Dysmenorrhea  
- F43.10 - Post-traumatic Stress Disorder  
  
** Chief Complaint **  
The Chief Complaint is: [facility] met with patient to follow-up regarding mood and   
medications. Patient had requested to meet with a different [facility] in office but 
was not   
avialable and agreeable to meet with this  provider. Patient reports noticing 
that   
anxiety has continued to feel increased recently. Patient reports continued 
stressors   
related to physical health. Patient has scheduled with specialists, but 
appointment   
is not for several weeks still. Patient reports that Buspar is not helpful with   
increased anxiety. Patient reports experiencing nightmares and vivid dreams 
again and   
waking up with increased anxiety. Patient reports no thoughts of harm toward 
self or   
others.  
  
** History of Present Illness **  
Linnette Beckham is a 24 year old female.  
- Appetite not normal - Irritability  
- Anxiety - Nightmares - Energy level is fair - Feeling guilty - Easily 
distracted -   
Racing thoughts - No depression - No loss of interest in activities - No social   
isolation  
  
** Current Medication **  
- Alcohol Pads 70% use to cleanse skin prior to checking blood sugars, 90 days, 
3   
refills  
- ARIPiprazole 5 MG Oral Tablet take 1 tablet by mouth once daily, 30 days, 5 
refills  
- Atorvastatin Calcium 20 MG Oral Tablet take 1 tablet by mouth once daily at   
bedtime, 30 days, 5 refills  
- Blood Glucose System Sriram Kit use to check sugars once daily (use what is 
covered),   
30 days, 0 refills  
- Blood Glucose Test In Vitro Strip use to cehck sugars (dispense what is 
covered),   
90 days, 3 refills  
- busPIRone HCl 10 MG Oral Tablet take 1 tablet by mouth twice daily (d/c 5 mg 
rx),   
30 days, 5 refills  
- CareSens Lancets Miscellaneous use to check sugars once daily (dispense what 
is   
covered), 90 days, 3 refills  
- Intuniv 1 MG Oral Tablet Extended Release 24 Hour take 1 tablet by mouth once 
daily   
at bedtime, 30 days, 5 refills  
- Januvia 50 MG Oral Tablet take 1 tablet by mouth once daily, 30 days, 5 
refills  
- lamoTRIgine 100 MG Oral Tablet take 1 tablet by mouth once daily, 30 days, 5   
refills  
- Lisinopril 5 MG Oral Tablet take 1 tablet by mouth once daily, 30 days, 5 
refills  
- MiraLax 17 GM/SCOOP Oral Powder mix 1 scoop with 8 ounces once daily, 30 days,
 2   
refills  
- Narcan 4 MG/0.1ML Nasal Liquid spray in nostril for symptoms of overdose , may
   
repeat in 2 minutes if symptoms persist, 1 days, 0 refills  
- Prazosin HCl 1 MG Oral Capsule take 1 capsule by mouth twice daily, 30 days, 5
   
refills  
- Sertraline HCl 50 MG Oral Tablet take 1 tablet by mouth once daily, 30 days, 5
   
refills  
- Slynd 4 MG Oral Tablet take 1 tablet by mouth at the same time everyday to 
help   
regulate your period, 28 days, 2 refills  
- traZODone HCl 100 MG Oral Tablet take 1 tablet by mouth twice daily, 30 days, 
5   
refills  
  
** Social History **  
Medical: Chronic illness.  
Environmental Exposure: No secondhand cigarette smoke exposure.  
Personal: Recent emotional stress.  
Behavioral: Not a current tobacco user.  
Tobacco use: Using electronic cigarettes/vaping.  
Alcohol: Not using alcohol.  
Drug Use: Not using drugs.  
  
** Physical Findings **  
General Appearance:  
- Normal Appearance.  
Neurological:  
- Estimated intelligence was normal. - Oriented to time, place, and person. - No
   
hallucinations. - Judgement was not impaired.  
Speech: - Is Normal.  
Psychiatric:  
- Mood was anxious. - Mood was concerned. - Attitude Open.  
Demonstrated Behavior: - Motor Activity Normal Activity. - Eye Contact 
Appropriate.  
Affect: - Congruent with the mood.  
Thought Content: - Insight was intact. - No delusions. - No suicidal ideation. -
 No   
Passive thoughts of Death. - No suicidal plans. - No suicidal intent. - No 
homicidal   
ideations. - No homicidal plans. - No homicidal intent.  
Past Medical:  
- No repetitive self injurious behavior.  
  
** Assessment **  
- F90.9 - Attention-deficit hyperactivity disorder, unspecified type  
- F31.31 - Bipolar disorder, current episode depressed, mild  
- F43.10 - Post-traumatic stress disorder, unspecified  
  
** Therapy **  
- Brief solution-focused therapy.  
- Adherent with medications.  
-  Visit 30 Minutes.  
- Plan - PCP to have patient increase Buspar to 10mg twice daily and continue   
Prazosin 1mg in the morning and increase to 2 mg at bedtime. Patient agreeable 
to   
plan and Collaborated with patient and provider:  
  
** Counseling/Education **  
P offered active and supportive listening and processed recent stressors.  
BHP discussed coping skills and supports to implement in managing increased 
anxiety   
such as tools learned previously in therapy.  
BHP discussed sleep hygiene strategies that can be implemented.  
P encouraged patient to follow-up with specialists as scheduled.  
  
** Plan **  
P to task other  provider to follow-up with patient regarding medication 
changes.  
  
** Care Team **  
- Doreen Cabrera CNP  
  
** Health Reminders **  
- Assess Tobacco Use satisfied 2024.  
  
  
[facility]06- Instructions  
  
Includes: Instructions for all patient encounters  
  
  
  
                                                    Education and Decision Aids   
were provided during visit for:  
   
                                                    Discussed nutritional needs   
teach healthy choices including fruits and   
vegetables  
   
                                                    Last Documented On   
4 4:46PM ; [facility]  
   
                                                    Patient education about a pr  
oper diet  
   
                                                    Last Documented On   
4 4:46PM ; [facility]  
   
                                                    Discussed concerns about exe  
rcise : promote physical activity  
   
                                                    Last Documented On   
4 4:46PM ; [facility]  
   
                                                    Not requesting contraception  
   
                                                    Last Documented On   
4 4:46PM ; [facility] offered active and suppo  
rtive listening and processed current stressors   
related   
to getting medications. ~[facility] discussed coping skills and supports to implement 
in   
daily routine. ~[facility] discussed progress patient has felt they have made recently 
and   
encouraged continued follow-up with providers to address health  
   
                                                    Last Documented On   
4 5:16PM ; [facility]  
   
                                                    Discussed nutritional needs   
teach healthy choices including fruits and   
vegetables  
   
                                                    Last Documented On   
4 7:14PM ; [facility]  
   
                                                    Patient education about a pr  
oper diet  
   
                                                    Last Documented On   
4 7:14PM ; [facility]  
   
                                                    Discussed concerns about exe  
rcise : promote physical activity  
   
                                                    Last Documented On   
4 7:14PM ; [facility]  
   
                                                    Not requesting contraception  
   
                                                    Last Documented On   
4 7:14PM ; [facility]  
   
                                                    Discussed nutritional needs   
teach healthy choices including fruits and   
vegetables  
   
                                                    Last Documented On   
3 5:15PM ; [facility]  
   
                                                    Patient education about a pr  
oper diet  
   
                                                    Last Documented On   
3 5:15PM ; [facility]  
   
                                                    Discussed concerns about exe  
rcise : promote physical activity  
   
                                                    Last Documented On   
3 5:15PM ; [facility]  
   
                                                    Discussed nutritional needs   
teach healthy choices including fruits and   
vegetables  
   
                                                    Last Documented On 10/21/202  
2 1:42PM ; [facility]  
   
                                                    Patient education about a pr  
oper diet  
   
                                                    Last Documented On 10/21/202  
2 1:42PM ; [facility]  
   
                                                    Discussed concerns about exe  
rcise : promote physical activity  
   
                                                    Last Documented On 10/21/202  
2 1:42PM ; [facility]  
 and supportive feedback; normalized emotions and   
feelings, also provided pt time to process any current stressors. ~Promoted and   
encouraged follow-through with scheduling psychiatric services  
   
                                                    Last Documented On   
2 3:21PM ; [facility] provided supportive, empa  
thic listening and reflective feedback. ~Explored,   
encouraged, and supported the pt to discuss current sx/mood, assess risk for   
harm/need, coping mechanisms, support network and safety planning. ~Supported 
pt's   
plan to f/up with Dr. Alonso, as planned at [place], [place]. ~Encouraged 
pt to   
use safety plan, if needed to ensure she remains safe  
   
                                                    Last Documented On   
2 2:27PM ; [facility]  
   
                                                    Discussed nutritional needs   
teach healthy choices including fruits and   
vegetables  
   
                                                    Last Documented On   
2 3:20PM ; [facility]  
   
                                                    Patient education about a pr  
oper diet  
   
                                                    Last Documented On   
2 3:20PM ; [facility]  
   
                                                    Discussed concerns about exe  
rcise : promote physical activity ~ ~Will restart   
trazodone and prazosin ~ ~Patient is planning to have brother stay with her for 
a few   
days for emotional support ~ ~Follow up with PCP at next scheduled visit ~ ~Call
   
psychiatrist office to schedule appt ~ ~Call counselor  
   
                                                    Last Documented On   
2 9:35AM ; [facility]  
   
                                                    Provided supportive listenin  
g and empathic feedback; encouraged, explored, and   
supported the pt as she processed current symptoms, Issues, and concerns. 
~Discussed   
past tx and explored current needs/options. Acknowledged and validated pt's 
thoughts   
and emotions. ~Explored coping mechanisms and support network; utilized 
opportunity   
for safety planning; promoted seeking positive support and seeking help, as 
needed  
   
                                                    Last Documented On   
2 3:28PM ; [facility] provided active listenin  
g, support and helped pt process though current   
symptoms   
and stressor(s). Discussed and explored past effectiveness of medication; 
identified   
objectives and future goals; promoted use of healthy coping mechanisms, and 
self-care   
practices  
   
                                                    Last Documented On   
2 3:19PM ; [facility]  
   
                                                    Reviewed side effects and Ri  
sks/Benefits analysis  
   
                                                    Last Documented On   
2 3:19PM ; [facility]  
   
                                                    Discussed nutritional needs   
teach healthy choices including fruits and   
vegetables  
   
                                                    Last Documented On   
2 3:15PM ; [facility]  
   
                                                    Patient education about a pr  
oper diet  
   
                                                    Last Documented On   
2 3:15PM ; [facility]  
   
                                                    Discussed concerns about exe  
rcise : promote physical activity  
   
                                                    Last Documented On   
2 3:15PM ; [facility] introduced pt to [facility] in  
tegrated model of care ~[facility] offered active listening  
 and   
supportive feedback; normalized emotions and feelings, also provided pt time to   
process any current stressors ~[facility] discussed potential benefits of counseling 
and   
supported re-engaging, as needed. ~[facility] encouraged pt to continue to make time to
   
implement self-care regimen and use coping methods, as needed  
   
                                                    Last Documented On   
2 4:07PM ; [facility]  
   
                                                    Discussed nutritional needs   
teach healthy choices including fruits and   
vegetables  
   
                                                    Last Documented On   
2 3:15PM ; [facility]  
   
                                                    Patient education about a pr  
oper diet  
   
                                                    Last Documented On   
2 3:15PM ; [facility]  
   
                                                    Inquiry and counseling about  
 medication administration and compliance  
   
                                                    Last Documented On   
2 7:37PM ; [facility]  
   
                                                    Discussed concerns about exe  
rcise : promote physical activity  
   
                                                    Last Documented On   
2 3:15PM ; [facility]  
   
                                                    Patient goals discussed  
   
                                                    Last Documented On   
2 7:37PM ; [facility]  
   
                                                    Ansewred pt's questions re B  
orderlline Personality D/O and Bipolar D/O raised by  
   
psychiatrist at [place]. ~Validated and normalized patient?s feelings while   
assisting to process recent events  
   
                                                    Last Documented On   
1 1:33AM ; [facility]  
   
                                                    Discussed nutritional needs   
teach healthy choices including fruits and   
vegetables  
   
                                                    Last Documented On   
1 2:04PM ; [facility]  
   
                                                    Patient education about a pr  
oper diet  
   
                                                    Last Documented On   
1 2:04PM ; [facility]  
   
                                                    Discussed concerns about exe  
rcise : promote physical activity  
   
                                                    Last Documented On   
1 2:04PM ; [facility] provided active listenin  
g, support and helped patient process through   
current   
symptoms and stressors with ongoing mental health concerns and medication 
changes.   
~P discussed coping skills and supports that patient is implementing.  
discussed   
implementing coping skills as discussed with counseling and attending weekly   
appointments as scheduled with counselor. Patient was encouraged to continue 
writing   
down concerns with medications and discuss with providers. ~[facility] reminded patient
 of   
crisis resources should they be needed. Patient reports having crisis resources 
and   
could return to ER  
   
                                                    Last Documented On   
1 10:17AM ; [facility]  
   
                                                    Patient education about a pr  
oper diet  
   
                                                    Last Documented On   
1 5:17PM ; [facility]  
   
                                                    Patient education about meal  
 planning  
   
                                                    Last Documented On   
1 5:17PM ; [facility]  
   
                                                    Education about changing eat  
ing habits  
   
                                                    Last Documented On   
1 5:17PM ; [facility]  
   
                                                    Patient education about high  
 fiber diet  
   
                                                    Last Documented On   
1 5:17PM ; [facility]  
   
                                                    Patient education about low   
fat diet  
   
                                                    Last Documented On   
1 5:17PM ; [facility]  
   
                                                    Patient education about low   
cholesterol diet  
   
                                                    Last Documented On   
1 5:17PM ; [facility]  
   
                                                    Patient education about low   
carbohydrate diet  
   
                                                    Last Documented On   
1 5:17PM ; [facility]  
   
                                                    Patient education about high  
 protein diet  
   
                                                    Last Documented On   
1 5:17PM ; [facility] offered active and suppo  
rtive listening, normalized emotions and feelings,   
and   
processed current stressors. ~[facility] discussed resources for finding a counselor 
and   
provided list of local resources. ~[facility] discussed patients coping skills and 
supports   
and encouraged patient to continue to implement. [facility] reminded patient of crisis
   
resources should they be needed  
   
                                                    Last Documented On   
1 7:15PM ; [facility]  
   
                                                    Discussed nutritional needs   
teach healthy choices including fruits and   
vegetables  
   
                                                    Last Documented On   
1 11:38AM ; [facility]  
   
                                                    Patient education about a pr  
oper diet  
   
                                                    Last Documented On   
1 11:38AM ; [facility]  
   
                                                    Patient education about a pr  
oper diet  
   
                                                    Last Documented On   
1 12:19PM ; [facility]  
   
                                                    Patient education about meal  
 planning  
   
                                                    Last Documented On   
1 12:19PM ; [facility]  
   
                                                    Education about changing eat  
ing habits  
   
                                                    Last Documented On   
1 12:19PM ; [facility]  
   
                                                    Patient education about high  
 fiber diet  
   
                                                    Last Documented On   
1 12:19PM ; [facility]  
   
                                                    Patient education about low   
fat diet  
   
                                                    Last Documented On   
1 12:19PM ; [facility]  
   
                                                    Patient education about low   
cholesterol diet  
   
                                                    Last Documented On   
1 12:19PM ; [facility]  
   
                                                    Patient education about low   
carbohydrate diet  
   
                                                    Last Documented On   
1 12:19PM ; [facility]  
   
                                                    Patient education about high  
 protein diet  
   
                                                    Last Documented On   
1 12:19PM ; [facility]  
   
                                                    Discussed concerns about exe  
rcise : promote physical activity  
   
                                                    Last Documented On   
1 11:38AM ; [facility] provided active listenin  
g, support and helped patient process through   
current   
symptoms and stressors related to family conflict. [facility] discussed coping skills 
and   
supports with patient that can be implemented and reminded patient of ways to 
access   
additional resources. [facility] discussed crisis resources and plan. Patient has 
crisis   
resources still available should they be needed  
   
                                                    Last Documented On 06/10/202  
1 7:04PM ; [facility]  
   
                                                    Discussed nutritional needs   
teach healthy choices including fruits and   
vegetables  
   
                                                    Last Documented On 06/10/202  
1 3:57PM ; [facility]  
   
                                                    Patient education about a pr  
oper diet  
   
                                                    Last Documented On 06/10/202  
1 3:57PM ; [facility]  
   
                                                    Discussed concerns about exe  
rcise : promote physical activity  
   
                                                    Last Documented On 06/10/202  
1 3:57PM ; [facility]. [facility] and PCP reassured   
patient of not sharing information with anyone unless she has signed a release 
for us   
to do so. ~[facility] provided active listening, support and helped patient process 
through   
current symptoms and stressors. ~[facility] discussed establishing counseling and   
psychiatry. [facility] discussed EMDR therapy and ways to find provider who does this 
type   
of therapy. ~P discussed crisis resources should mood worsen, [facility] provided 
text   
hotline number for crisis. P reviewed crisis plan with patient and patient is 
able   
to contact positive supports and family when feeling down  
   
                                                    Last Documented On   
1 11:46AM ; [facility]  
   
                                                    Discussed nutritional needs   
teach healthy choices including fruits and   
vegetables  
   
                                                    Last Documented On   
1 2:11PM ; [facility]  
   
                                                    Patient education about a pr  
oper diet  
   
                                                    Last Documented On   
1 2:11PM ; [facility]  
   
                                                    Discussed concerns about exe  
rcise : promote physical activity  
   
                                                    Last Documented On   
1 2:11PM ; [facility]  
Work Phone: [phone]- Evaluation note  
  
Includes: Assessments for all patient encounters  
  
  
  
                                Findings        Encounter       Date  
   
                                        [D64.9 - Anemia, unspecified] anemia Med  
ical Established Patient with Doreen Cabrera LARISSA                              2024  
  
  
  
                                                    Last Documented On   
4 6:51PM ; [facility]  
  
  
  
                                                    [Z68.43 - Body mass index [B  
MI]   
50.0-59.9, adult] assessment of body   
mass index                              Medical Established Patient with   
Doreen Cabrera CNP                        2024  
  
  
  
                                                    Last Documented On   
4 6:51PM ; [facility]  
  
  
  
                                                    Bipolar affective disorder,   
current   
episode depressed, mild                  Established Patient with Leonie   
Short LISWS                             2024  
  
  
  
                                                    Last Documented On   
4 5:16PM ; [facility]  
  
  
  
                                        Post-traumatic stress disorder  Establ  
ished Patient with Leonie Short   
LISWS                                   2024  
  
  
  
                                                    Last Documented On   
4 5:16PM ; [facility]  
  
  
  
                                                    Undifferentiated attention d  
eficit   
disorder                                 Established Patient with   
Leonie Short LISWS                     2024  
  
  
  
                                                    Last Documented On   
4 5:16PM ; [facility]  
  
  
  
                                        Visit for: screening for disorder BH Est  
ablished Patient with Leonie Garrett LISWS                             2024  
  
  
  
                                                    Last Documented On   
4 5:16PM ; [facility]  
  
  
  
                                                    [Z68.43 - Body mass index [B  
MI]   
50.0-59.9, adult] assessment of body   
mass index                              Medical Established Patient with   
Doreen Cabrera CNP                        2024  
  
  
  
                                                    Last Documented On   
4 7:50PM ; [facility]  
  
  
  
                                        Attention-deficit hyperactivity disorder  
 Medical Established Patient with   
Doreen Cabrera CNP                        2024  
  
  
  
                                                    Last Documented On   
4 7:50PM ; [facility]  
  
  
  
                                                    Diabetes Risk Test Score was  
 three   
score 3/27/2024                         Medical Established Patient with Doreen Cabrera CNP                              2024  
  
  
  
                                                    Last Documented On   
4 7:50PM ; [facility]  
  
  
  
                                        Visit for: screening for STD Medical Est  
ablished Patient with Doreen Cabrera   
CNP                                     2024  
  
  
  
                                                    Last Documented On   
4 7:50PM ; [facility]  
  
  
  
                                                    [Z68.32 - Body mass index [B  
MI]   
32.0-32.9, adult] assessment of body   
mass index                              Medical Established Patient with   
Doreen Cabrera CNP                        2023  
  
  
  
                                                    Last Documented On   
3 6:01PM ; [facility]  
  
  
  
                                        Borderline personality disorder Medical   
Established Patient with Doreen Cabrera CNP                              2023  
  
  
  
                                                    Last Documented On   
3 6:01PM ; [facility]  
  
  
  
                                        Screening for diabetes mellitus Medical   
Established Patient with Doreen Cabrera CNP                              2023  
  
  
  
                                                    Last Documented On   
3 6:01PM ; [facility]  
  
  
  
                                        Assessment of body mass index Medical Es  
tablished Patient with Doreen Cabrera CNP                              10/21/2022  
  
  
  
                                                    Last Documented On 10/21/202  
2 2:45PM ; [facility]  
  
  
  
                                                    Bipolar affective disorder,   
current   
episode manic                            [facility] with Haylie Aguilar LPCC-S                        2022  
  
  
  
                                                    Last Documented On   
2 3:22PM ; [facility]  
  
  
  
                                                    Bipolar affective disorder,   
current   
episode manic                           BH Established Patient with Haylie   
Jeff LPCC-S                        2022  
  
  
  
                                                    Last Documented On   
2 2:28PM ; [facility]  
  
  
  
                                                    Bipolar affective disorder,   
current   
episode depressed, severe with   
psychosis                                [facility] with Haylie Aguilar LPCC-S                        2022  
  
  
  
                                                    Last Documented On   
2 3:29PM ; [facility]  
  
  
  
                                                    Assessment of body mass inde  
x [Body   
mass index [BMI] 50.0-59.9, adult]      Open [facility] - Established with Julieth   
Aliya CNP                               2022  
  
  
  
                                                    Last Documented On   
2 9:36AM ; [facility]  
  
  
  
                                                    Bipolar I disorder, most rec  
ent   
episode, manic                          Open [facility] - Established with Julieth   
Aliya CNP                               2022  
  
  
  
                                                    Last Documented On   
2 9:36AM ; [facility]  
  
  
  
                                        Post-traumatic stress disorder  Establ  
ished Patient with Haylienicanor Aguilar   
LPCC-S                                  2022  
  
  
  
                                                    Last Documented On   
2 3:20PM ; [facility]  
  
  
  
                                No cough        Medical Established Patient with  
 Doreen Deborah CNP 2022  
  
  
  
                                                    Last Documented On   
2 4:04PM ; [facility]  
  
  
  
                                                    Z68.43 - Body mass index [BM  
I]   
50.0-59.9, adult                        Medical Established Patient with   
Doreen Deborah CNP                        2022  
  
  
  
                                                    Last Documented On   
2 4:04PM ; [facility]  
  
  
  
                                                    Borderline personality disor  
danny Pt   
reported hx of sx/dx                     Established Patient with Haylie Aguilar LPCC-S                        2022  
  
  
  
                                                    Last Documented On   
2 4:08PM ; [facility]  
  
  
  
                                                    Assessment of body mass inde  
x [Body   
mass index [BMI] 50.0-59.9, adult]      [facility] - Established with Julieth   
Aliya CNP                               2022  
  
  
  
                                                    Last Documented On   
2 7:41PM ; [facility]  
  
  
  
                                                    Diabetes Risk Test Score was  
 three   
score 2022                         Open Access - Established with Julieth   
Aliya CNP                               2022  
  
  
  
                                                    Last Documented On   
2 7:41PM ; [facility]  
  
  
  
                                                    Bipolar I disorder, most rec  
ent   
episode, manic                           Established Patient with Avis Felder LPCC-S                          2021  
  
  
  
                                                    Last Documented On   
1 1:35AM ; [facility]  
  
  
  
                                        Borderline personality disorder  Estab  
lished Patient with Avis   
Felder LPCC-S                          2021  
  
  
  
                                                    Last Documented On   
1 1:35AM ; [facility]  
  
  
  
                                        Post-traumatic stress disorder  Establ  
ished Patient with Avis   
Felder LPCC-S                          2021  
  
  
  
                                                    Last Documented On   
1 1:35AM ; [facility]  
  
  
  
                                                    Assessment of visit for: bhargavi myles for   
human immunodeficiency virus            Medical Established Patient with   
Doreen Deborah CNP                        2021  
  
  
  
                                                    Last Documented On   
1 2:56PM ; [facility]  
  
  
  
                                Nicotine dependence Medical Established Patient   
with Doreen Deborah CNP 2021  
  
  
  
                                                    Last Documented On   
1 2:56PM ; [facility]  
  
  
  
                                Tachycardia     Medical Established Patient with  
 Doreen Deborah CNP 2021  
  
  
  
                                                    Last Documented On   
1 2:56PM ; [facility]  
  
  
  
                                                    Z68.43 - Body mass index [BM  
I]   
50.0-59.9, adult                        Medical Established Patient with   
Doreen Deborah CNP                        2021  
  
  
  
                                                    Last Documented On   
1 2:56PM ; [facility]  
  
  
  
                                                    Bipolar I disorder, most rec  
ent   
episode, manic                           Established Patient with Leonie   
Short LISWS                             2021  
  
  
  
                                                    Last Documented On   
1 10:17AM ; [facility]  
  
  
  
                                                    Borderline personality disor  
danny per   
patient reported history                 Established Patient with Leonie   
Short LISWS                             2021  
  
  
  
                                                    Last Documented On   
1 10:17AM ; [facility]  
  
  
  
                                Nicotine dependence  Established Patient with   
Leonie Short LISWS 2021  
  
  
  
                                                    Last Documented On   
1 10:17AM ; [facility]  
  
  
  
                                        Post-traumatic stress disorder  Establ  
ished Patient with Leonie Short   
LISWS                                   2021  
  
  
  
                                                    Last Documented On   
1 10:17AM ; [facility]  
  
  
  
                                Body mass index Medical Established Patient with  
 Doreen Deborah CNP 2021  
  
  
  
                                                    Last Documented On   
1 5:23PM ; [facility]  
  
  
  
                                Morbid obesity  Medical Established Patient with  
 Doreen Deborah CNP 2021  
  
  
  
                                                    Last Documented On   
1 5:23PM ; [facility]  
  
  
  
                                        Nicotine dependence uncomplicated [facility]  
l Established Patient with Doreen   
Deborah CNP                              2021  
  
  
  
                                                    Last Documented On   
1 5:23PM ; [facility]  
  
  
  
                                                    Z68.42 - Body mass index [BM  
I]   
45.0-49.9, adult                        Medical Established Patient with   
Doreen Deborah CNP                        2021  
  
  
  
                                                    Last Documented On   
1 5:23PM ; [facility]  
  
  
  
                                Episodic mood disorders On Call with Pamela edwards CNP 2021  
  
  
  
                                                    Last Documented On   
1 7:49AM ; [facility]  
  
  
  
                                                    Mood disorders, NOS per tabatha  
ent   
reported history                         Established Patient with Leonie   
Short LISWS                             2021  
  
  
  
                                                    Last Documented On   
1 7:15PM ; [facility]  
  
  
  
                                        Post-traumatic stress disorder  Establ  
ished Patient with Leonie Short   
LISWS                                   2021  
  
  
  
                                                    Last Documented On   
1 7:15PM ; [facility]  
  
  
  
                                Morbid obesity  Medical Established Patient with  
 Doreen Deborah CNP 2021  
  
  
  
                                                    Last Documented On   
1 12:31PM ; [facility]  
  
  
  
                                Otitis externa  Medical Established Patient with  
 Doreen Deborah CNP 2021  
  
  
  
                                                    Last Documented On   
1 12:31PM ; [facility]  
  
  
  
                                                    Z68.42 - Body mass index [BM  
I]   
45.0-49.9, adult                        Medical Established Patient with   
Doreen Deborah CNP                        2021  
  
  
  
                                                    Last Documented On   
1 12:31PM ; [facility]  
  
  
  
                                        Post-traumatic stress disorder  Establ  
ished Patient with Leonie Short   
LISWS                                   06/10/2021  
  
  
  
                                                    Last Documented On 06/10/202  
1 10:05PM ; [facility]  
  
  
  
                                Morbid obesity  Medical Established Patient with  
 Doreen Deborah CNP 06/10/2021  
  
  
  
                                                    Last Documented On 06/10/202  
1 4:45PM ; [facility]  
  
  
  
                                                    Z68.42 - Body mass index [BM  
I]   
45.0-49.9, adult                        Medical Established Patient with   
Doreen Deborah CNP                        06/10/2021  
  
  
  
                                                    Last Documented On 06/10/202  
1 4:45PM ; [facility]  
  
  
  
                                        Post-traumatic stress disorder  Establ  
ished Patient with Leonie Short   
LISWS                                   2021  
  
  
  
                                                    Last Documented On   
1 11:59AM ; [facility]  
  
  
  
                                                    Assessment of visit for: bhargavi myles for   
human immunodeficiency virus            Medical New Patient with Doreen Cabrera CNP                              2021  
  
  
  
                                                    Last Documented On   
1 4:06PM ; [facility]  
  
  
  
                                                    Diabetes Risk Test Score was  
 one score   
2021                               Medical New Patient with Doreen Cabrera CNP                              2021  
  
  
  
                                                    Last Documented On   
1 4:06PM ; [facility]  
  
  
  
                                Hypertension    Medical New Patient with Doreen DAVID esposito CNP 2021  
  
  
  
                                                    Last Documented On   
1 4:06PM ; [facility]  
  
  
  
                                Morbid obesity  Medical New Patient with Doreen C  
cate CNP 2021  
  
  
  
                                                    Last Documented On   
1 4:06PM ; [facility]  
  
  
  
                                Post-traumatic stress disorder Medical New Patie  
nt with Doreen Cabrera CNP   
2021  
  
  
  
                                                    Last Documented On   
1 4:06PM ; [facility]  
  
  
  
                                                    Z68.42 - Body mass index [BM  
I]   
45.0-49.9, adult                        Medical New Patient with Doreen Cabrera CNP                              2021  
  
  
  
                                                    Last Documented On   
1 4:06PM ; [facility]  
Work Phone: [phone]- Evaluation note  
  
Includes: Assessments for all patient encounters  
  
  
  
                                Findings        Encounter       Date  
   
                                                    Attention-deficit hyperactiv  
ity   
disorder                                 Established Patient with Leonie   
Short LISWS                             2024  
  
  
  
                                                    Last Documented On   
4 10:10AM ; [facility]  
  
  
  
                                                    Bipolar I disorder, most rec  
ent   
episode, depressed - mild                Established Patient with Leonie   
Short LISWS                             2024  
  
  
  
                                                    Last Documented On   
4 10:10AM ; [facility]  
  
  
  
                                        Post-traumatic stress disorder  Establ  
ished Patient with Leonie Short   
LISWS                                   2024  
  
  
  
                                                    Last Documented On   
4 10:10AM ; [facility]  
  
  
  
                                        [D64.9 - Anemia, unspecified] anemia Med  
ical Established Patient with   
Doreenpaola Cabrera CNP                        2024  
  
  
  
                                                    Last Documented On   
4 6:51PM ; [facility]  
  
  
  
                                                    [Z68.43 - Body mass index [B  
MI]   
50.0-59.9, adult] assessment of body   
mass index                              Medical Established Patient with   
Doreen Cabrera CNP                        2024  
  
  
  
                                                    Last Documented On   
4 6:51PM ; [facility]  
  
  
  
                                                    Bipolar affective disorder,   
current   
episode depressed, mild                  Established Patient with Leonie   
Short LISWS                             2024  
  
  
  
                                                    Last Documented On   
4 5:16PM ; [facility]  
  
  
  
                                        Post-traumatic stress disorder BH Establ  
ished Patient with Leonie Short   
LISWS                                   2024  
  
  
  
                                                    Last Documented On   
4 5:16PM ; [facility]  
  
  
  
                                                    Undifferentiated attention d  
eficit   
disorder                                 Established Patient with   
Leonie Short LISWS                     2024  
  
  
  
                                                    Last Documented On   
4 5:16PM ; [facility]  
  
  
  
                                        Visit for: screening for disorder BH Est  
[facility]                             [facility]  
  
  
  
                                                    Last Documented On   
4 5:16PM ; [facility]  
  
  
  
                                                    [Z68.43 - Body mass index [B  
MI]   
50.0-59.9, adult] assessment of body   
mass index                              Medical Established Patient with   
Doreen Cabrera CNP                        2024  
  
  
  
                                                    Last Documented On   
4 7:50PM ; [facility]  
  
  
  
                                        Attention-deficit hyperactivity disorder  
 Medical Established Patient with   
Doreen Cabrera CNP                        2024  
  
  
  
                                                    Last Documented On   
4 7:50PM ; [facility]  
  
  
  
                                                    Diabetes Risk Test Score was  
 three   
score 3/27/2024                         Medical Established Patient with Doreen Cabrera CNP                              2024  
  
  
  
                                                    Last Documented On   
4 7:50PM ; [facility]  
  
  
  
                                        Visit for: screening for STD Medical Est  
ablished Patient with Doreen Cabrera   
CNP                                     2024  
  
  
  
                                                    Last Documented On   
4 7:50PM ; [facility]  
  
  
  
                                                    [Z68.32 - Body mass index [B  
MI]   
32.0-32.9, adult] assessment of body   
mass index                              Medical Established Patient with   
Doreen Cabrera CNP                        2023  
  
  
  
                                                    Last Documented On   
3 6:01PM ; [facility]  
  
  
  
                                        Borderline personality disorder Medical   
Established Patient with Doreen   
Deborah CNP                              2023  
  
  
  
                                                    Last Documented On   
3 6:01PM ; [facility]  
  
  
  
                                        Screening for diabetes mellitus Medical   
Established Patient with Doreen   
Deborah CNP                              2023  
  
  
  
                                                    Last Documented On   
3 6:01PM ; [facility]  
  
  
  
                                        Assessment of body mass index Medical Es  
tablished Patient with Doreen   
Deborah CNP                              10/21/2022  
  
  
  
                                                    Last Documented On 10/21/202  
2 2:45PM ; [facility]  
  
  
  
                                                    Bipolar affective disorder,   
current   
episode manic                            [facility] with Haylie   
Aguilar [facility]                        2022  
  
  
  
                                                    Last Documented On   
2 3:22PM ; [facility]  
  
  
  
                                                    Bipolar affective disorder,   
current   
episode manic                            Established Patient with Haylie   
Aguilar LP[facility]-S                        2022  
  
  
  
                                                    Last Documented On   
2 2:28PM ; [facility]  
  
  
  
                                                    Bipolar affective disorder,   
current   
episode depressed, severe with   
psychosis                                [facility] with Haylie   
Aguilar [facility]S                        2022  
  
  
  
                                                    Last Documented On   
2 3:29PM ; [facility]  
  
  
  
                                                    Assessment of body mass inde  
x [Body   
mass index [BMI] 50.0-59.9, adult]      [facility] - Established with Julieth   
Aliya CNP                               2022  
  
  
  
                                                    Last Documented On   
2 9:36AM ; [facility]  
  
  
  
                                                    Bipolar I disorder, most rec  
ent   
episode, manic                          [facility] - Established with Julieth   
Aliya CNP                               2022  
  
  
  
                                                    Last Documented On   
2 9:36AM ; [facility]  
  
  
  
                                        Post-traumatic stress disorder BH Establ  
ished Patient with Haylie Aguilar   
[facility]-S                                  2022  
  
  
  
                                                    Last Documented On   
2 3:20PM ; [facility]  
  
  
  
                                No cough        Medical Established Patient with  
 Doreen Deborah CNP 2022  
  
  
  
                                                    Last Documented On   
2 4:04PM ; [facility]  
  
  
  
                                                    Z68.43 - Body mass index [BM  
I]   
50.0-59.9, adult                        Medical Established Patient with   
Doreen Deborah CNP                        2022  
  
  
  
                                                    Last Documented On   
2 4:04PM ; [facility]  
  
  
  
                                                    Borderline personality disor  
danny Pt   
reported hx of sx/dx                     [facility]                        [facility]  
  
  
  
                                                    Last Documented On   
2 4:08PM ; [facility]  
  
  
  
                                                    Assessment of body mass inde  
x [Body   
mass index [BMI] 50.0-59.9, adult]      [facility] - Established with Julieth   
Aliya CNP                               2022  
  
  
  
                                                    Last Documented On   
2 7:41PM ; [facility]  
  
  
  
                                                    Diabetes Risk Test Score was  
 three   
score 2022                         Open Access - Established with Julieth   
Aliya CNP                               2022  
  
  
  
                                                    Last Documented On   
2 7:41PM ; [facility]  
  
  
  
                                                    Bipolar I disorder, most rec  
ent   
episode, manic                           Establ[facility] Patient [facility]vis   
[facility]                          [facility]  
  
  
  
                                                    Last Documented On   
1 1:35AM ; [facility]  
  
  
  
                                        Borderline personality disorder  Estab  
lished Patient with Avis   
[facility]                          20[facility]  
  
  
  
                                                    Last Documented On   
1 1:35AM ; [facility]  
  
  
  
                                        Post-traumatic stress disorder  Establ  
ished Patient with [facility]vis   
[facility]                          20[facility]  
  
  
  
                                                    Last Documented On   
1 1:35AM ; [facility]  
  
  
  
                                                    Assessment of visit for: bhargavi guevaraning for   
human immunodeficiency virus            Medical Established Patient with   
Doreen Deborah CNP                        2021  
  
  
  
                                                    Last Documented On   
1 2:56PM ; [facility]  
  
  
  
                                Nicotine dependence Medical Established Patient   
with Doreen Deborah CNP 2021  
  
  
  
                                                    Last Documented On   
1 2:56PM ; [facility]  
  
  
  
                                Tachycardia     Medical Established Patient with  
 Doreen Deborah CNP 2021  
  
  
  
                                                    Last Documented On   
1 2:56PM ; [facility]  
  
  
  
                                                    Z68.43 - Body mass index [BM  
I]   
50.0-59.9, adult                        Medical Established Patient with   
Doreen Deborah CNP                        2021  
  
  
  
                                                    Last Documented On   
1 2:56PM ; [facility]  
  
  
  
                                                    Bipolar I disorder, most rec  
ent   
episode, manic                           Established Patient with Leonie   
Short LISWS                             2021  
  
  
  
                                                    Last Documented On   
1 10:17AM ; [facility]  
  
  
  
                                                    Borderline personality disor  
danny per   
patient reported history                 Established Patient with Leonie   
Short LISWS                             2021  
  
  
  
                                                    Last Documented On   
1 10:17AM ; [facility]  
  
  
  
                                Nicotine dependence BH Established Patient with   
Leonie Short LISWS 2021  
  
  
  
                                                    Last Documented On   
1 10:17AM ; [facility]  
  
  
  
                                        Post-traumatic stress disorder  Establ  
ished Patient with Leonie Short   
LISWS                                   2021  
  
  
  
                                                    Last Documented On   
1 10:17AM ; [facility]  
  
  
  
                                Body mass index Medical Established Patient with  
 Doreen Deborah CNP 2021  
  
  
  
                                                    Last Documented On   
1 5:23PM ; [facility]  
  
  
  
                                Morbid obesity  Medical Established Patient with  
 Doreen Deborah CNP 2021  
  
  
  
                                                    Last Documented On   
1 5:23PM ; [facility]  
  
  
  
                                        Nicotine dependence uncomplicated Medica  
l Established Patient with Doreen   
Deborah CNP                              2021  
  
  
  
                                                    Last Documented On   
1 5:23PM ; [facility]  
  
  
  
                                                    Z68.42 - Body mass index [BM  
I]   
45.0-49.9, adult                        Medical Established Patient with   
Doreen Deborah CNP                        2021  
  
  
  
                                                    Last Documented On   
1 5:23PM ; [facility]  
  
  
  
                                Episodic mood disorders On Call with Pamela edwards CNP 2021  
  
  
  
                                                    Last Documented On   
1 7:49AM ; [facility]  
  
  
  
                                                    Mood disorders, NOS per tabatha  
ent   
reported history                         Established Patient with Leonie   
Short LISWS                             2021  
  
  
  
                                                    Last Documented On   
1 7:15PM ; [facility]  
  
  
  
                                        Post-traumatic stress disorder  Establ  
ished Patient with Leonie Short   
LISWS                                   2021  
  
  
  
                                                    Last Documented On   
1 7:15PM ; [facility]  
  
  
  
                                Morbid obesity  Medical Established Patient with  
 Doreen Deborah CNP 2021  
  
  
  
                                                    Last Documented On   
1 12:31PM ; [facility]  
  
  
  
                                Otitis externa  Medical Established Patient with  
 Doreen Deborah CNP 2021  
  
  
  
                                                    Last Documented On   
1 12:31PM ; [facility]  
  
  
  
                                                    Z68.42 - Body mass index [BM  
I]   
45.0-49.9, adult                        Medical Established Patient with   
Doreen Deborah CNP                        2021  
  
  
  
                                                    Last Documented On   
1 12:31PM ; [facility]  
  
  
  
                                        Post-traumatic stress disorder  Establ  
ished Patient with Leonie Short   
LISWS                                   06/10/2021  
  
  
  
                                                    Last Documented On 06/10/202  
1 10:05PM ; [facility]  
  
  
  
                                Morbid obesity  Medical Established Patient with  
 Doreen Deborah CNP 06/10/2021  
  
  
  
                                                    Last Documented On 06/10/202  
1 4:45PM ; [facility]  
  
  
  
                                                    Z68.42 - Body mass index [BM  
I]   
45.0-49.9, adult                        Medical Established Patient with   
Doreen Deborah CNP                        06/10/2021  
  
  
  
                                                    Last Documented On 06/10/202  
1 4:45PM ; [facility]  
  
  
  
                                        Post-traumatic stress disorder  Establ  
ished Patient with Leonie Short   
LISWS                                   2021  
  
  
  
                                                    Last Documented On   
1 11:59AM ; [facility]  
  
  
  
                                                    Assessment of visit for: bhargavi guevaraning for   
human immunodeficiency virus            Medical New Patient with Doreen   
Deborah CNP                              2021  
  
  
  
                                                    Last Documented On   
1 4:06PM ; [facility]  
  
  
  
                                                    Diabetes Risk Test Score was  
 one score   
2021                               Medical New Patient with Doreen   
Deborah CNP                              2021  
  
  
  
                                                    Last Documented On   
1 4:06PM ; [facility]  
  
  
  
                                Hypertension    Medical New Patient with Doreen C  
cate CNP 2021  
  
  
  
                                                    Last Documented On   
1 4:06PM ; [facility]  
  
  
  
                                Morbid obesity  Medical New Patient with Doreen C  
cate CNP 2021  
  
  
  
                                                    Last Documented On   
1 4:06PM ; [facility]  
  
  
  
                                Post-traumatic stress disorder Medical New Patie  
nt with Doreen Deborah CNP   
2021  
  
  
  
                                                    Last Documented On   
1 4:06PM ; [facility]  
  
  
  
                                                    Z68.42 - Body mass index [BM  
I]   
45.0-49.9, adult                        Medical New Patient with Doreen   
Deborah CNP                              2021  
  
  
  
                                                    Last Documented On   
1 4:06PM ; [facility]  
Work Phone: [phone]- History general Narrative - Reported  
  
Includes: Medical History in patient's chart  
  
  
  
                                        Description         Last Updated  
   
                                        POTS                2024  
  
  
  
                                                    Last Documented On   
4 6:51PM ; [facility]  
  
  
  
                                        0 previous live birth(s) 2024  
  
  
  
                                                    Last Documented On   
4 7:50PM ; [facility]  
  
  
  
                                        Previously pregnant 1 time(s) 2024  
  
  
  
                                                    Last Documented On   
4 7:50PM ; [facility]  
  
  
  
                                        Recent immunization for flu 2024  
  
  
  
                                                    Last Documented On   
4 7:50PM ; [facility]  
  
  
  
                                        Has sex without a condom 2022  
  
  
  
                                                    Last Documented On   
2 4:04PM ; [facility]  
  
  
  
                                        Not planning to have a baby in the next   
12 months 2022  
  
  
  
                                                    Last Documented On   
2 4:04PM ; [facility]  
  
  
  
                                        Partners sexually transmitted infection   
status known 2022  
  
  
  
                                                    Last Documented On   
2 4:04PM ; [facility]  
  
  
  
                                        No previous hospitalizations 2022  
  
  
  
                                                    Last Documented On   
2 4:04PM ; [facility]  
  
  
  
                                        Chronic illness     2021  
  
  
  
                                                    Last Documented On   
1 7:49AM ; [facility]  
  
  
  
                                        Exposure to COVID-19 2021  
  
  
  
                                                    Last Documented On   
1 12:31PM ; [facility]  
  
  
  
                                        History of gynecologic disorder 20  
21  
  
  
  
                                                    Last Documented On   
1 4:06PM ; [facility]  
  
  
  
                                        History of Polycystic Ovarian Syndrome (  
PCOS) 2021  
  
  
  
                                                    Last Documented On   
1 4:06PM ; [facility]  
  
  
  
                                        History of anxiety disorder NOS 20  
21  
  
  
  
                                                    Last Documented On   
1 4:06PM ; [facility]  
  
  
  
                                        History of migraine headache 2021  
  
  
  
                                                    Last Documented On   
1 4:06PM ; [facility]  
  
  
  
                                        History of psychiatric disorders biopola  
r disorder 2021  
  
  
  
                                                    Last Documented On   
1 4:06PM ; [facility]  
Work Phone: [phone]- History general Narrative - Reported  
  
Includes: Medical History in patient's chart  
  
  
  
                                        Description         Last Updated  
   
                                        POTS                2024  
  
  
  
                                                    Last Documented On   
4 6:51PM ; [facility]  
  
  
  
                                        0 previous live birth(s) 2024  
  
  
  
                                                    Last Documented On   
4 7:50PM ; [facility]  
  
  
  
                                        Previously pregnant 1 time(s) 2024  
  
  
  
                                                    Last Documented On   
4 7:50PM ; [facility]  
  
  
  
                                        Recent immunization for flu 2024  
  
  
  
                                                    Last Documented On   
4 7:50PM ; [facility]  
  
  
  
                                        Has sex without a condom 2022  
  
  
  
                                                    Last Documented On   
2 4:04PM ; [facility]  
  
  
  
                                        Not planning to have a baby in the next   
12 months 2022  
  
  
  
                                                    Last Documented On   
2 4:04PM ; [facility]  
  
  
  
                                        Partners sexually transmitted infection   
status known 2022  
  
  
  
                                                    Last Documented On   
2 4:04PM ; [facility]  
  
  
  
                                        No previous hospitalizations 2022  
  
  
  
                                                    Last Documented On   
2 4:04PM ; [facility]  
  
  
  
                                        Chronic illness     2021  
  
  
  
                                                    Last Documented On   
1 7:49AM ; [facility]  
  
  
  
                                        Exposure to COVID-19 2021  
  
  
  
                                                    Last Documented On   
1 12:31PM ; [facility]  
  
  
  
                                        History of gynecologic disorder 20  
21  
  
  
  
                                                    Last Documented On   
1 4:06PM ; [facility]  
  
  
  
                                        History of Polycystic Ovarian Syndrome (  
PCOS) 2021  
  
  
  
                                                    Last Documented On   
1 4:06PM ; [facility]  
  
  
  
                                        History of anxiety disorder NOS 20  
21  
  
  
  
                                                    Last Documented On   
1 4:06PM ; [facility]  
  
  
  
                                        History of migraine headache 2021  
  
  
  
                                                    Last Documented On   
1 4:06PM ; [facility]  
  
  
  
                                        History of psychiatric disorders biopola  
r disorder 2021  
  
  
  
                                                    Last Documented On   
1 4:06PM ; [facility]  
Work Phone: [phone]- History general Narrative - Reported  
  
Includes: Medical History in patient's chart  
  
  
  
                                        Description         Last Updated  
   
                                        POTS                2024  
  
  
  
                                                    Last Documented On   
4 6:51PM ; [facility]  
  
  
  
                                        0 previous live birth(s) 2024  
  
  
  
                                                    Last Documented On   
4 7:50PM ; [facility]  
  
  
  
                                        Previously pregnant 1 time(s) 2024  
  
  
  
                                                    Last Documented On   
4 7:50PM ; [facility]  
  
  
  
                                        Recent immunization for flu 2024  
  
  
  
                                                    Last Documented On   
4 7:50PM ; [facility]  
  
  
  
                                        Has sex without a condom 2022  
  
  
  
                                                    Last Documented On   
2 4:04PM ; [facility]  
  
  
  
                                        Not planning to have a baby in the next   
12 months 2022  
  
  
  
                                                    Last Documented On   
2 4:04PM ; [facility]  
  
  
  
                                        Partners sexually transmitted infection   
status known 2022  
  
  
  
                                                    Last Documented On   
2 4:04PM ; [facility]  
  
  
  
                                        No previous hospitalizations 2022  
  
  
  
                                                    Last Documented On   
2 4:04PM ; [facility]  
  
  
  
                                        Chronic illness     2021  
  
  
  
                                                    Last Documented On   
1 7:49AM ; [facility]  
  
  
  
                                        Exposure to COVID-19 2021  
  
  
  
                                                    Last Documented On   
1 12:31PM ; [facility]  
  
  
  
                                        History of gynecologic disorder 20  
21  
  
  
  
                                                    Last Documented On   
1 4:06PM ; [facility]  
  
  
  
                                        History of Polycystic Ovarian Syndrome (  
PCOS) 2021  
  
  
  
                                                    Last Documented On   
1 4:06PM ; [facility]  
  
  
  
                                        History of anxiety disorder NOS 20  
21  
  
  
  
                                                    Last Documented On   
1 4:06PM ; [facility]  
  
  
  
                                        History of migraine headache 2021  
  
  
  
                                                    Last Documented On   
1 4:06PM ; [facility]  
  
  
  
                                        History of psychiatric disorders biopola  
r disorder 2021  
  
  
  
                                                    Last Documented On   
1 4:06PM ; [facility]  
Work Phone: [phone]- History general Narrative - Reported  
  
Includes: Medical History in patient's chart  
  
  
  
                                        Description         Last Updated  
   
                                        POTS                2024  
  
  
  
                                                    Last Documented On   
4 6:51PM ; [facility]  
  
  
  
                                        0 previous live birth(s) 2024  
  
  
  
                                                    Last Documented On   
4 7:50PM ; [facility]  
  
  
  
                                        Previously pregnant 1 time(s) 2024  
  
  
  
                                                    Last Documented On   
4 7:50PM ; [facility]  
  
  
  
                                        Recent immunization for flu 2024  
  
  
  
                                                    Last Documented On   
4 7:50PM ; [facility]  
  
  
  
                                        Has sex without a condom 2022  
  
  
  
                                                    Last Documented On   
2 4:04PM ; [facility]  
  
  
  
                                        Not planning to have a baby in the next   
12 months 2022  
  
  
  
                                                    Last Documented On   
2 4:04PM ; [facility]  
  
  
  
                                        Partners sexually transmitted infection   
status known 2022  
  
  
  
                                                    Last Documented On   
2 4:04PM ; [facility]  
  
  
  
                                        No previous hospitalizations 2022  
  
  
  
                                                    Last Documented On   
2 4:04PM ; [facility]  
  
  
  
                                        Chronic illness     2021  
  
  
  
                                                    Last Documented On   
1 7:49AM ; [facility]  
  
  
  
                                        Exposure to COVID-19 2021  
  
  
  
                                                    Last Documented On   
1 12:31PM ; [facility]  
  
  
  
                                        History of gynecologic disorder 20  
21  
  
  
  
                                                    Last Documented On   
1 4:06PM ; [facility]  
  
  
  
                                        History of Polycystic Ovarian Syndrome (  
PCOS) 2021  
  
  
  
                                                    Last Documented On   
1 4:06PM ; [facility]  
  
  
  
                                        History of anxiety disorder NOS 20  
21  
  
  
  
                                                    Last Documented On   
1 4:06PM ; [facility]  
  
  
  
                                        History of migraine headache 2021  
  
  
  
                                                    Last Documented On   
1 4:06PM ; [facility]  
  
  
  
                                        History of psychiatric disorders biopola  
r disorder 2021  
  
  
  
                                                    Last Documented On   
1 4:06PM ; [facility]  
Work Phone: [phone]- Progress note*   
  
Progress note  
  
  
  
                                Date            Encounter       Last Documented   
by  
   
                                2024      Medical Established Patient Last  
 documented on 2024; 6:51 PM,   
Doreen Cabrera CNP; [facility]  
  
  
  
                                                      
  
  
** Active Problems & Conditions **  
- F90.9 - Attention-deficit Hyperactivity Disorder  
- F31.31 - Bipolar I Disorder, Most Recent Episode, Depressed Mild  
- F43.10 - Post-traumatic Stress Disorder  
  
** Chief Complaint **  
The Chief Complaint is: Patient is not seeing [facility] any longer and wants to
   
discuss metformin. Patient is still taking med, but it is still getting diarrhea
 and   
nausea. Patient is wanting to get into [facility]. She has tried to contact Dr. Patterson 
in   
[place], but has not heard back. Patient provided with phone number for [place]   
[facility].  
Patient here for 2 month med check. Patient is taking meds as prescribed. 
Patient is   
still feeling anxiousness at times.  
Patient refused HPV and PCV.  
  
** Referred Here **  
Not referred by urgent care clinic and not the emergency room. No prior 
encounters.  
- Data to be reviewed: no clinical lab tests  
  
** History of Present Illness **  
Linnette Beckham is a 24 year old female.  
- Allergy list reviewed - Reviewed Medications - Medication list reviewed  
- Date of last menstruation 2024 - Pregnancy test - would not like a 
pregnancy   
test  
Presents with sig other  Tyesha  for med f/u . wants to get a new gyn . reports 
no   
longer doing counseling r/t  she said I dont need it  labs reviewed from last 
month   
ER visit and I explained her cbc shows anemia , likely r/t heavy periods . I 
would   
like them to get further labs as pt does admit to fatigue / sleeping 14 hours 
per day  
  
** Current Medication **  
- ARIPiprazole 5 MG Oral Tablet take 1 tablet by mouth once daily, 30 days, 5 
refills  
- busPIRone HCl 5 MG Oral Tablet take 1 tablet by mouth twice daily, 30 days, 5   
refills  
- Intuniv 1 MG Oral Tablet Extended Release 24 Hour take 1 tablet by mouth once 
daily   
at bedtime, 30 days, 5 refills  
- lamoTRIgine 100 MG Oral Tablet take 1 tablet by mouth once daily, 30 days, 5   
refills  
- MiraLax 17 GM/SCOOP Oral Powder mix 1 scoop with 8 ounces once daily, 30 days,
 2   
refills  
- Narcan 4 MG/0.1ML Nasal Liquid spray in nostril for symptoms of overdose , may
   
repeat in 2 minutes if symptoms persist, 1 days, 0 refills  
- Prazosin HCl 1 MG Oral Capsule take 1 capsule by mouth twice daily, 30 days, 5
   
refills  
- Sertraline HCl 50 MG Oral Tablet take 1 tablet by mouth once daily, 30 days, 5
   
refills  
- traZODone HCl 100 MG Oral Tablet take 1 tablet by mouth twice daily, 30 days, 
5   
refills  
  
** Past Medical/Surgical History **  
Reported:  
Has sex without a condom.  
Medical: No previous hospitalizations. Chronic illness and Sexually transmitted   
infection Partners sexually transmitted infection status known.  
Immunization History: Recent immunization for flu.  
Exposure: Exposure to COVID-19.  
Pregnancy: Previously pregnant 1 time(s) and para having 0 live birth(s). Not   
planning a pregnancy in the next year.  
Diagnoses:  
Polycystic Ovarian Syndrome (PCOS).  
Migraine headache.  
Psychiatric disorders biopolar disorder  
Anxiety disorder NOS  
POTS.  
Procedural:  
- Insertion of ear pressure equalization tubes in both ears  
Surgical:  
- Tonsillectomy  
- Tonsillectomy with adenoidectomy  
  
** Social History **  
Environmental Exposure: Secondhand cigarette smoke exposure.  
Behavioral: Not a current tobacco user.  
Tobacco use: Using electronic cigarettes/vaping.  
Alcohol: Not using alcohol.  
Drug Use: Not using drugs denied by patient.  
Sexual: Sexually active age of sexual partner is _____ years old 21, sexual   
orientation Lesbian or David, gender identity Other, and birth control is being   
practiced Not Using - In Same Sex Relationship.  
  
** Allergies **  
- Abilify Reaction: groggy  
- Augmentin Reaction: Shock  
- Metformin Reaction: Nausea, Vomiting  
- NO KNOWN ENVIRONMENTAL ALLERGIES  
- NO KNOWN FOOD ALLERGIES  
- Sertraline Reaction: slow/groggy  
- Trazodone Hydrochloride Reaction: Panic attack  
  
** Family History **  
Paternal:  
Systemic hypertension  
Oncologic disorder  
Maternal:  
Systemic hypertension  
Epilepsy and recurrent seizures  
Psychiatric disorders  
Oncologic disorder  
Fraternal:  
Psychiatric disorders  
  
** Review Of Systems **  
Head: No head symptoms.  
Neck: No neck symptoms.  
Eyes: No eye symptoms.  
Otolaryngeal: No ear symptoms, no nasal symptoms, no nose and sinus finding, no   
throat symptoms, no oral cavity symptoms, and no jaw symptoms.  
Breasts: No breast symptoms.  
Cardiovascular: No cardiovascular symptoms and no chest pain or discomfort.  
Pulmonary: No pulmonary symptoms.  
Gastrointestinal: No gastrointestinal symptoms.  
Genitourinary: No genitourinary symptoms. Obstetrics and gynecology: heavy 
periods.  
Musculoskeletal: No musculoskeletal symptoms.  
Neurological: No neurological symptoms.  
Psychological: No psychological symptoms.  
Skin: No skin symptoms.  
Cardiovascular: Edema not present.  
  
** Physical Findings **  
- Vitals taken 2024 04:39 pm  
BP-Sitting L161/94 mmHg  
BP Cuff SizeLarge  
Pulse Rate-Ijsqdtb36 bpm  
Vosrrh35 in  
Rttpgi039 lbs  
Body Mass Index54.6 kg/m2  
Body Surface Area2.4 m2  
Oxygen Dzzyjfmeix35 %  
  
- Vitals taken 2024 04:50 pm  
BP-Sitting L137/89 mmHg  
BP Cuff SizeLarge  
Pulse Rate-Lglcxmg75 bpm  
  
Vital Signs:  
- Systolic blood pressure 130 - 139 mmHg.  
- Diastolic blood pressure 80-89 mmHg.  
General Appearance:  
- Awake. - Alert. - Well developed. - Well nourished. - In no acute distress.  
Lungs:  
- Respiration rhythm and depth was normal. - Clear to auscultation.  
Cardiovascular:  
Heart Rate And Rhythm: - Normal.  
Heart Sounds: - Normal.  
Murmurs: - No murmurs were heard.  
Abdomen:  
Visual Inspection: - Abdomen was normal on visual inspection.  
Musculoskeletal System:  
General/bilateral: - Normal movement of all extremities.  
Skin:  
- General appearance was normal.  
  
** Tests **  
Laboratory-based Chemistry:  
Other Laboratory Tests:  
Screening for sexually transmitted infections was not performed.  
  
** Assessment **  
- Z68.43 - Body mass index [BMI] 50.0-59.9, adult  
- D64.9 - Anemia, unspecified  
  
** Counseling/Education **  
- Does not want to stop using current contraception  
- Wishing to stop using electronic cigarettes/vaping  
- Discussed nutritional needs teach healthy choices including fruits and 
vegetables  
- Patient education about a proper diet  
- Not requesting contraception  
- Discussed concerns about exercise: promote physical activity  
  
** Plan **  
StartCited- Anemia, unspecified  
Lab: Iron and TIBC  
Lab: Ferritin  
Lab: CBC With Differential/Platelet  
Lab: TSH+T4F+T3Free  
Lab: Thyroid Antibodies  
EndCited  
StartCited- Other  
Follow-up Appointment  
2 month med check  
EndCited  
# given to [facility] , call michael win appt asap . labs asap to further 
check   
on anemia.  
  
** Care Team **  
- Doreen Cabrera CNP  
  
** Health Reminders **  
- Assess BMI satisfied 2024.  
- Assess Tobacco Use satisfied 2024.  
- Follow Up Plan BMI Management satisfied 2024.  
  
** User Defined 1 **  
Not planning a pregnancy in the next year.  
  
  
[facility]- Progress note*   
  
Progress note  
  
  
  
                                Date            Encounter       Last Documented   
by  
   
                                2024       Established Patient Last docu  
mented on 2024; 10:10 AM,   
Leonie HUTCHINSON; [facility]  
  
  
  
                                                      
  
  
** Active Problems & Conditions **  
- F90.9 - Attention-deficit Hyperactivity Disorder  
- F31.31 - Bipolar I Disorder, Most Recent Episode, Depressed Mild  
- F43.10 - Post-traumatic Stress Disorder  
  
** Chief Complaint **  
The Chief Complaint is: [facility] met with patient to follow-up regarding mood and PHQ
   
score of 2 and ESTHELA score of 6. Patient reports noticing increased anxiety,   
overthinking and catastrophizing recently. Patient reports in the past, summers 
have   
been difficult and noticed a decline in mood around this time of year. Patient   
reports having several positive things ongoing in life at this time. Patient 
reports   
stressors related to physical health. Patient reports no thoughts of harm toward
 self   
or others.  
  
** History of Present Illness **  
Linnette Beckham is a 24 year old female.  
- Normal appetite - Irritability  
- Anxiety with persistent worry - With anticipation of misfortune to self or 
others -   
Sleep disturbances as patient reports sleeping too much (around 14-16 hours) - 
Energy   
level is fair - Feeling guilty - Racing thoughts - No depression - No loss of   
interest in activities - Not easily distracted - No social isolation - No 
impulsive   
behavior  
  
** Current Medication **  
- ARIPiprazole 5 MG Oral Tablet take 1 tablet by mouth once daily, 30 days, 5 
refills  
- busPIRone HCl 5 MG Oral Tablet take 1 tablet by mouth twice daily, 30 days, 5   
refills  
- Intuniv 1 MG Oral Tablet Extended Release 24 Hour take 1 tablet by mouth once 
daily   
at bedtime, 30 days, 5 refills  
- lamoTRIgine 100 MG Oral Tablet take 1 tablet by mouth once daily, 30 days, 5   
refills  
- MiraLax 17 GM/SCOOP Oral Powder mix 1 scoop with 8 ounces once daily, 30 days,
 2   
refills  
- Narcan 4 MG/0.1ML Nasal Liquid spray in nostril for symptoms of overdose , may
   
repeat in 2 minutes if symptoms persist, 1 days, 0 refills  
- Prazosin HCl 1 MG Oral Capsule take 1 capsule by mouth twice daily, 30 days, 5
   
refills  
- Sertraline HCl 50 MG Oral Tablet take 1 tablet by mouth once daily, 30 days, 5
   
refills  
- traZODone HCl 100 MG Oral Tablet take 1 tablet by mouth twice daily, 30 days, 
5   
refills  
  
** Social History **  
Medical: Chronic illness with POTS and PCOS.  
Environmental Exposure: No secondhand cigarette smoke exposure.  
Personal: Family problems and recent emotional stress.  
Behavioral: Not a current tobacco user.  
Tobacco use: Using electronic cigarettes/vaping.  
Alcohol: Not using alcohol.  
Drug Use: Not using drugs.  
  
** Physical Findings **  
General Appearance:  
- Normal Appearance.  
Neurological:  
- Estimated intelligence was normal. - Oriented to time, place, and person. - No
   
hallucinations. - Judgement was not impaired.  
Speech: - Is Normal.  
Psychiatric:  
- Mood is Euthymic. - Attitude Open.  
Demonstrated Behavior: - Motor Activity Normal Activity. - Eye Contact 
Appropriate.  
Affect: - Congruent with the mood.  
Thought Content: - Insight was intact. - No delusions. - No suicidal ideation. -
 No   
Passive thoughts of Death. - No suicidal plans. - No suicidal intent. - No 
homicidal   
ideations. - No homicidal plans. - No homicidal intent.  
Past Medical:  
- No repetitive self injurious behavior.  
  
** Assessment **  
- F90.9 - Attention-deficit hyperactivity disorder, unspecified type  
- F31.31 - Bipolar disorder, current episode depressed, mild  
- F43.10 - Post-traumatic stress disorder, unspecified  
  
** Therapy **  
- Developmental/Behavioral Screening & Testing - PHQ9 and 
Developmental/Behavioral   
Screening & Testing - GAD7.  
- Brief solution-focused therapy.  
- Adherent with medications.  
-  Visit 30 Minutes.  
- Plan - do not modify medication at this time. Patient would like to see if   
addressing physical health concerns helps before changing medications. 
Collaborated   
with patient and provider:  
  
** Counseling/Education **  
P offered active and supportive listening and processed current stressors.  
P discussed coping skills and supports that can be implemented to manage 
increased   
anxiety and stressors. BHP discussed potential of resuming counseling, even if 
for   
monthly visits to process ongoing stressors.  
BHP encouraged patient to follow-up on referrals as discussed with PCP.  
  
** Plan **  
BHP to attempt to follow-up with patient via phone in 2 weeks and at next in 
person   
visit as scheduled.  
  
** Care Team **  
- Doreen Cabrera CNP  
  
** Health Reminders **  
- Assess Tobacco Use satisfied 2024.  
- ESTHELA-2 satisfied 2024.  
- PHQ9 / PHQA satisfied 2024.  
  
** User Defined 1 **  
ESTHELA-2 score was three 2024, ESTHELA-7 score was six [ESTHELA-7] Feeling nervous, 
anxious   
or on edge? + 2 pt : More than half the days, [ESTHELA-7] Feeling nervous, anxious 
or on   
edge? + 2 pt : More than half the days, [ESTHELA-7] Not being able to stop or 
control   
worrying? + 1 pt : Several days, [ESTHELA-7] Not being able to stop or control 
worrying?   
+ 1 pt : Several days, [ESTHELA-7] Worrying too much about different things? + 1 pt 
:   
Several days, [ESTHELA-7] Trouble relaxing? + 0 pt : Not at all, [ESTHELA-7] Being so   
restless that it's hard to sit still? + 0 pt : Not at all, [ESTHELA-7] Becoming 
easily   
annoyed or irritable? + 1 pt : Several days, [ESTHELA-7] Feeling afraid as if 
something   
awful might happen? + 1 pt : Several days, Patient Health Questionnaire 9-Item 
total   
score was two 2024 If you checked off problems, how difficult is it for you
to   
do your work? + : Somewhat difficult, [PHQ-9-1] Little interest or pleasure in 
doing   
things? + 0 pt : Not at all, [PHQ-9-2] Feeling down, depressed, or hopeless? + 0
 pt :   
Not at all, [PHQ-9-3] Trouble falling or staying asleep or sleeping too much? + 
1 pt   
: Several days, [PHQ-9-4] Feeling tired or having little energy? + 0 pt : Not at
 all,   
[PHQ-9-5] Poor appetite or overeating? + 0 pt : Not at all, [PHQ-9-6] Feeling 
bad   
about yourself-or that you are a failure + 1 pt : Several days, [PHQ-9-7] 
Trouble   
concentrating on things such as reading the newspaper + 0 pt : Not at all, 
[PHQ-9-8]   
Moving or speaking so slowly that other people have noticed. + 0 pt : Not at 
all,   
[PHQ-9-9] Thoughts that you would be better off dead or hurting yourself? + 0 pt
 :   
Not at all, and ESTHELA If you checked off problems, how difficult is it for you to 
do   
your work, take care of things, or get along? Somewhat difficult.  
  
  
[facility]04- Progress note*   
  
Progress note  
  
  
  
                                Date            Encounter       Last Documented   
by  
   
                                2024      Chart Update    Last documented   
on 2024; 9:32 AM, Doreen Cabrera CNP;   
[facility]  
  
  
  
                                                      
  
  
** Active Problems & Conditions **  
- F90.9 - Attention-deficit Hyperactivity Disorder  
- F31.31 - Bipolar I Disorder, Most Recent Episode, Depressed Mild  
- F43.10 - Post-traumatic Stress Disorder  
  
** Current Medication **  
- ARIPiprazole 5 MG Oral Tablet take 1 tablet by mouth once daily, 30 days, 5 
refills  
- busPIRone HCl 5 MG Oral Tablet take 1 tablet by mouth twice daily, 30 days, 5   
refills  
- Intuniv 1 MG Oral Tablet Extended Release 24 Hour take 1 tablet by mouth once 
daily   
at bedtime, 30 days, 5 refills  
- lamoTRIgine 100 MG Oral Tablet take 1 tablet by mouth once daily, 30 days, 5   
refills  
- metFORMIN HCl 500 MG Oral Tablet AM and PM per GYN/[facility] in [place], 30 days, 0
   
refills  
- MiraLax 17 GM/SCOOP Oral Powder mix 1 scoop with 8 ounces once daily, 30 days,
 2   
refills  
- Narcan 4 MG/0.1ML Nasal Liquid spray in nostril for symptoms of overdose , may
   
repeat in 2 minutes if symptoms persist, 1 days, 0 refills  
- Prazosin HCl 1 MG Oral Capsule take 1 capsule by mouth twice daily, 30 days, 5
   
refills  
- Sertraline HCl 50 MG Oral Tablet take 1 tablet by mouth once daily, 30 days, 5
   
refills  
- traZODone HCl 100 MG Oral Tablet take 1 tablet by mouth twice daily, 30 days, 
5   
refills  
  
** Past Medical/Surgical History **  
Reported:  
Has sex without a condom.  
Medical: No previous hospitalizations. Chronic illness and Sexually transmitted   
infection Partners sexually transmitted infection status known.  
Immunization History: Recent immunization for flu.  
Exposure: Exposure to COVID-19.  
Pregnancy: Previously pregnant 1 time(s) and para having 0 live birth(s). Not   
planning a pregnancy in the next year.  
Diagnoses:  
Polycystic Ovarian Syndrome (PCOS).  
Migraine headache.  
Psychiatric disorders biopolar disorder  
Anxiety disorder NOS  
Procedural:  
- Insertion of ear pressure equalization tubes in both ears  
Surgical:  
- Tonsillectomy  
- Tonsillectomy with adenoidectomy  
  
** Allergies **  
- Abilify Reaction: groggy  
- Augmentin Reaction: Shock  
- metformin Reaction: Nausea, Vomiting  
- trazodone Reaction: Panic attack  
- Zoloft Reaction: slow/ groggy  
  
** Family History **  
Paternal:  
Systemic hypertension  
Oncologic disorder  
Maternal:  
Systemic hypertension  
Epilepsy and recurrent seizures  
Psychiatric disorders  
Oncologic disorder  
Fraternal:  
Psychiatric disorders  
  
** Tests **  
Physician's Services:  
- Outside Chlamydia Test was Negative 3/27/2024  
  
** Care Team **  
- Doreen Cabrera CNP  
  
  
[facility]- Evaluation note  
  
Includes: Assessments for all patient encounters  
  
  
  
                                Findings        Encounter       Date  
   
                                                    Bipolar affective disorder,   
current   
episode depressed, mild                  Established Patient with Leonie   
Short [facility]                             2024  
  
  
  
                                                    Last Documented On   
4 7:46PM ; [facility]  
  
  
  
                                        Post-traumatic stress disorder  Establ  
ished Patient with Leonie Short   
LISWS                                   2024  
  
  
  
                                                    Last Documented On   
4 7:46PM ; [facility]  
  
  
  
                                                    Undifferentiated attention d  
eficit   
disorder                                 Established Patient with   
Leonie Short [facility]                     2024  
  
  
  
                                                    Last Documented On   
4 7:46PM ; [facility]  
  
  
  
                                        Visit for: screening for disorder  Est  
ablished Patient with Leonie   
Short [facility]                             2024  
  
  
  
                                                    Last Documented On   
4 7:46PM ; [facility]  
  
  
  
                                                    [Z68.43 - Body mass index [B  
MI]   
50.0-59.9, adult] assessment of body   
mass index                              Medical Established Patient with   
Doreen Cabrera CNP                        2024  
  
  
  
                                                    Last Documented On   
4 7:50PM ; [facility]  
  
  
  
                                        Attention-deficit hyperactivity disorder  
 Medical Established Patient with   
Doreen Cabrera CNP                        2024  
  
  
  
                                                    Last Documented On   
4 7:50PM ; [facility]  
  
  
  
                                                    Diabetes Risk Test Score was  
 three   
score 3/27/2024                         Medical Established Patient with Doreen Cabrera CNP                              2024  
  
  
  
                                                    Last Documented On   
4 7:50PM ; [facility]  
  
  
  
                                        Visit for: screening for STD Medical Est  
ablished Patient with Doreen Cabrera   
CNP                                     2024  
  
  
  
                                                    Last Documented On   
4 7:50PM ; [facility]  
  
  
  
                                                    [Z68.32 - Body mass index [B  
MI]   
32.0-32.9, adult] assessment of body   
mass index                              Medical Established Patient with   
Doreen Cabrera CNP                        2023  
  
  
  
                                                    Last Documented On   
3 6:01PM ; [facility]  
  
  
  
                                        Borderline personality disorder Medical   
Established Patient with Doreen Cabrera CNP                              2023  
  
  
  
                                                    Last Documented On   
3 6:01PM ; [facility]  
  
  
  
                                        Screening for diabetes mellitus Medical   
Established Patient with Doreen Cabrera CNP                              2023  
  
  
  
                                                    Last Documented On   
3 6:01PM ; [facility]  
  
  
  
                                        Assessment of body mass index Medical Es  
tablished Patient with Doreen Cabrera CNP                              10/21/2022  
  
  
  
                                                    Last Documented On 10/21/202  
2 2:45PM ; [facility]  
  
  
  
                                                    Bipolar affective disorder,   
current   
episode manic                            [facility]                        [facility]  
  
  
  
                                                    Last Documented On   
2 3:22PM ; [facility]  
  
  
  
                                                    Bipolar affective disorder,   
current   
episode manic                            Established Patient with Haylienicanor Martinerson [facility]                        2022  
  
  
  
                                                    Last Documented On   
2 2:28PM ; [facility]  
  
  
  
                                                    Bipolar affective disorder,   
current   
episode depressed, severe with   
psychosis                                Telebehavioral Health with Haylienicanor Aguilar [facility]                        2022  
  
  
  
                                                    Last Documented On   
2 3:29PM ; [facility]  
  
  
  
                                                    Assessment of body mass inde  
x [Body   
mass index [BMI] 50.0-59.9, adult]      Open Access - Established with Julieth Pisano CNP                               2022  
  
  
  
                                                    Last Documented On   
2 9:36AM ; [facility]  
  
  
  
                                                    Bipolar I disorder, most rec  
ent   
episode, manic                          Open Access - Established with Julieth   
Aliya CNP                               2022  
  
  
  
                                                    Last Documented On   
2 9:36AM ; [facility]  
  
  
  
                                        Post-traumatic stress disorder  Establ  
ished Patient with Haylienicanor MartinAguilar   
[facility]                                  2022  
  
  
  
                                                    Last Documented On   
2 3:20PM ; [facility]  
  
  
  
                                No cough        Medical Established Patient with  
 Doreen Deborah CNP 2022  
  
  
  
                                                    Last Documented On   
2 4:04PM ; [facility]  
  
  
  
                                                    Z68.43 - Body mass index [BM  
I]   
50.0-59.9, adult                        Medical Established Patient with   
Doreen Deborah CNP                        2022  
  
  
  
                                                    Last Documented On   
2 4:04PM ; [facility]  
  
  
  
                                                    Borderline personality disor  
danny Pt   
reported hx of sx/dx                     Established Patient with Haylienicanor Martinerson [facility]                        2022  
  
  
  
                                                    Last Documented On   
2 4:08PM ; [facility]  
  
  
  
                                                    Assessment of body mass inde  
x [Body   
mass index [BMI] 50.0-59.9, adult]      Open Access - Established with Julieth Pisano CNP                               2022  
  
  
  
                                                    Last Documented On   
2 7:41PM ; [facility]  
  
  
  
                                                    Diabetes Risk Test Score was  
 three   
score 2022                         Open Access - Established with Julieth Pisano CNP                               2022  
  
  
  
                                                    Last Documented On   
2 7:41PM ; [facility]  
  
  
  
                                                    Bipolar I disorder, most rec  
ent   
episode, manic                          BH Established Patient with Avis   
Felder [facility]                          2021  
  
  
  
                                                    Last Documented On   
1 1:35AM ; [facility]  
  
  
  
                                        Borderline personality disorder  Estab  
lished Patient with Avis   
Felder LP[facility]S                          2021  
  
  
  
                                                    Last Documented On   
1 1:35AM ; [facility]  
  
  
  
                                        Post-traumatic stress disorder  Establ  
ished Patient with Avis   
Felder LP[facility]-S                          2021  
  
  
  
                                                    Last Documented On   
1 1:35AM ; [facility]  
  
  
  
                                                    Assessment of visit for: bhargavi guevaraning for   
human immunodeficiency virus            Medical Established Patient with   
Doreen Deborah CNP                        2021  
  
  
  
                                                    Last Documented On   
1 2:56PM ; [facility]  
  
  
  
                                Nicotine dependence Medical Established Patient   
with Doreen Deborah CNP 2021  
  
  
  
                                                    Last Documented On   
1 2:56PM ; [facility]  
  
  
  
                                Tachycardia     Medical Established Patient with  
 Doreen Deborah CNP 2021  
  
  
  
                                                    Last Documented On   
1 2:56PM ; [facility]  
  
  
  
                                                    Z68.43 - Body mass index [BM  
I]   
50.0-59.9, adult                        Medical Established Patient with   
Doreen Deborah CNP                        2021  
  
  
  
                                                    Last Documented On   
1 2:56PM ; [facility]  
  
  
  
                                                    Bipolar I disorder, most rec  
ent   
episode, manic                           Established Patient with Leonie   
Short [facility]                             2021  
  
  
  
                                                    Last Documented On   
1 10:17AM ; [facility]  
  
  
  
                                                    Borderline personality disor  
danny per   
patient reported history                 Established Patient with Leonie   
Short [facility]                             2021  
  
  
  
                                                    Last Documented On   
1 10:17AM ; [facility]  
  
  
  
                                Nicotine dependence  Established Patient with   
Leonie Short L[facility]WS 2021  
  
  
  
                                                    Last Documented On   
1 10:17AM ; [facility]  
  
  
  
                                        Post-traumatic stress disorder  Establ  
ished Patient with Leonie Short   
LISWS                                   2021  
  
  
  
                                                    Last Documented On   
1 10:17AM ; [facility]  
  
  
  
                                Body mass index Medical Established Patient with  
 Doreen Deborah CNP 2021  
  
  
  
                                                    Last Documented On   
1 5:23PM ; [facility]  
  
  
  
                                Morbid obesity  Medical Established Patient with  
 Doreen Deborah CNP 2021  
  
  
  
                                                    Last Documented On   
1 5:23PM ; [facility]  
  
  
  
                                        Nicotine dependence uncomplicated Medica  
l Established Patient with Doreen   
Deborah CNP                              2021  
  
  
  
                                                    Last Documented On   
1 5:23PM ; [facility]  
  
  
  
                                                    Z68.42 - Body mass index [BM  
I]   
45.0-49.9, adult                        Medical Established Patient with   
Doreen Deborah CNP                        2021  
  
  
  
                                                    Last Documented On   
1 5:23PM ; [facility]  
  
  
  
                                Episodic mood disorders On Call with Pamela edwards CNP 2021  
  
  
  
                                                    Last Documented On   
1 7:49AM ; [facility]  
  
  
  
                                                    Mood disorders, NOS per tabatha  
ent   
reported history                         Established Patient with Leonie   
Short [facility]                             2021  
  
  
  
                                                    Last Documented On   
1 7:15PM ; [facility]  
  
  
  
                                        Post-traumatic stress disorder  Establ  
ished Patient with Leonie Short   
LISWS                                   2021  
  
  
  
                                                    Last Documented On   
1 7:15PM ; [facility]  
  
  
  
                                Morbid obesity  Medical Established Patient with  
 Doreen Deborah CNP 2021  
  
  
  
                                                    Last Documented On   
1 12:31PM ; [facility]  
  
  
  
                                Otitis externa  Medical Established Patient with  
 Doreen Deborah CNP 2021  
  
  
  
                                                    Last Documented On   
1 12:31PM ; [facility]  
  
  
  
                                                    Z68.42 - Body mass index [BM  
I]   
45.0-49.9, adult                        Medical Established Patient with   
Doreen Deborah CNP                        2021  
  
  
  
                                                    Last Documented On   
1 12:31PM ; [facility]  
  
  
  
                                        Post-traumatic stress disorder  Establ  
ished Patient with Leonie Short   
LISWS                                   06/10/2021  
  
  
  
                                                    Last Documented On 06/10/202  
1 10:05PM ; [facility]  
  
  
  
                                Morbid obesity  Medical Established Patient with  
 Doreen Deborah CNP 06/10/2021  
  
  
  
                                                    Last Documented On 06/10/202  
1 4:45PM ; [facility]  
  
  
  
                                                    Z68.42 - Body mass index [BM  
I]   
45.0-49.9, adult                        Medical Established Patient with   
Doreen Deborah CNP                        06/10/2021  
  
  
  
                                                    Last Documented On 06/10/202  
1 4:45PM ; [facility]  
  
  
  
                                        Post-traumatic stress disorder  Establ  
ished Patient with Leonie Short   
LISWS                                   2021  
  
  
  
                                                    Last Documented On   
1 11:59AM ; [facility]  
  
  
  
                                                    Assessment of visit for: scr  
eening for   
human immunodeficiency virus            Medical New Patient with Doreenpaola Cabrera CNP                              2021  
  
  
  
                                                    Last Documented On   
1 4:06PM ; [facility]  
  
  
  
                                                    Diabetes Risk Test Score was  
 one score   
2021                               Medical New Patient with Doreen Cabrera CNP                              2021  
  
  
  
                                                    Last Documented On   
1 4:06PM ; [facility]  
  
  
  
                                Hypertension    Medical New Patient with Doreen esposito CNP 2021  
  
  
  
                                                    Last Documented On   
1 4:06PM ; [facility]  
  
  
  
                                Morbid obesity  Medical New Patient with Doreenpaola esposito CNP 2021  
  
  
  
                                                    Last Documented On   
1 4:06PM ; [facility]  
  
  
  
                                Post-traumatic stress disorder Medical New Patie  
nt with Doreenpaola Mccormacken CNP   
2021  
  
  
  
                                                    Last Documented On   
1 4:06PM ; [facility]  
  
  
  
                                                    Z68.42 - Body mass index [BM  
I]   
45.0-49.9, adult                        Medical New Patient with Doreen Cabrera CNP                              2021  
  
  
  
                                                    Last Documented On   
1 4:06PM ; [facility]  
Work Phone: [phone]- Evaluation note  
  
Includes: Assessments for all patient encounters  
  
  
  
                                Findings        Encounter       Date  
   
                                                    Bipolar affective disorder,   
current   
episode depressed, mild                  Established Patient with Leonie   
Short L[facility]                             2024  
  
  
  
                                                    Last Documented On   
4 5:16PM ; [facility]  
  
  
  
                                        Post-traumatic stress disorder BH Establ  
ished Patient with Leonie Short   
LISWS                                   2024  
  
  
  
                                                    Last Documented On   
4 5:16PM ; [facility]  
  
  
  
                                                    Undifferentiated attention d  
eficit   
disorder                                 Established Patient with   
Leonie Short LISWS                     2024  
  
  
  
                                                    Last Documented On   
4 5:16PM ; [facility]  
  
  
  
                                        Visit for: screening for disorder BH Est  
ablished Patient with Leonie   
Short LISWS                             2024  
  
  
  
                                                    Last Documented On   
4 5:16PM ; [facility]  
  
  
  
                                                    [Z68.43 - Body mass index [B  
MI]   
50.0-59.9, adult] assessment of body   
mass index                              Medical Established Patient with   
Doreenpaola Cabrera CNP                        2024  
  
  
  
                                                    Last Documented On   
4 7:50PM ; [facility]  
  
  
  
                                        Attention-deficit hyperactivity disorder  
 Medical Established Patient with   
Doreen Deborah CNP                        2024  
  
  
  
                                                    Last Documented On   
4 7:50PM ; [facility]  
  
  
  
                                                    Diabetes Risk Test Score was  
 three   
score 3/27/2024                         Medical Established Patient with Doreen Cabrera CNP                              2024  
  
  
  
                                                    Last Documented On   
4 7:50PM ; [facility]  
  
  
  
                                        Visit for: screening for STD Medical Est  
ablished Patient with Doreen Cabrera   
CNP                                     2024  
  
  
  
                                                    Last Documented On   
4 7:50PM ; [facility]  
  
  
  
                                                    [Z68.32 - Body mass index [B  
MI]   
32.0-32.9, adult] assessment of body   
mass index                              Medical Established Patient with   
Doreen Cabrera CNP                        2023  
  
  
  
                                                    Last Documented On   
3 6:01PM ; [facility]  
  
  
  
                                        Borderline personality disorder Medical   
Established Patient with Doreen Cabrera CNP                              2023  
  
  
  
                                                    Last Documented On   
3 6:01PM ; [facility]  
  
  
  
                                        Screening for diabetes mellitus Medical   
Established Patient with Doreen Cabrera CNP                              2023  
  
  
  
                                                    Last Documented On   
3 6:01PM ; [facility]  
  
  
  
                                        Assessment of body mass index Medical Es  
tablished Patient with Doreen Cabrera CNP                              10/21/2022  
  
  
  
                                                    Last Documented On 10/21/202  
2 2:45PM ; [facility]  
  
  
  
                                                    Bipolar affective disorder,   
current   
episode manic                            Telebehavioral Health with Haylienicanor Martinerson LPCC-S                        2022  
  
  
  
                                                    Last Documented On   
2 3:22PM ; [facility]  
  
  
  
                                                    Bipolar affective disorder,   
current   
episode manic                            Established Patient with Haylie   
Aguilar LPCC-S                        2022  
  
  
  
                                                    Last Documented On   
2 2:28PM ; [facility]  
  
  
  
                                                    Bipolar affective disorder,   
current   
episode depressed, severe with   
psychosis                                Telebehavioral Health with Haylie   
Aguilar LPCC-S                        2022  
  
  
  
                                                    Last Documented On   
2 3:29PM ; [facility]  
  
  
  
                                                    Assessment of body mass inde  
x [Body   
mass index [BMI] 50.0-59.9, adult]      Open Access - Established with Julieth Pisano CNP                               2022  
  
  
  
                                                    Last Documented On   
2 9:36AM ; [facility]  
  
  
  
                                                    Bipolar I disorder, most rec  
ent   
episode, manic                          Open Access - Established with Julieth   
Aliya CNP                               2022  
  
  
  
                                                    Last Documented On   
2 9:36AM ; [facility]  
  
  
  
                                        Post-traumatic stress disorder  Establ  
ished Patient with Haylie Aguilar   
LPCC-S                                  2022  
  
  
  
                                                    Last Documented On   
2 3:20PM ; [facility]  
  
  
  
                                No cough        Medical Established Patient with  
 Doreenpaola Cabrera CNP 2022  
  
  
  
                                                    Last Documented On   
2 4:04PM ; [facility]  
  
  
  
                                                    Z68.43 - Body mass index [BM  
I]   
50.0-59.9, adult                        Medical Established Patient with   
Doreen Cabrera CNP                        2022  
  
  
  
                                                    Last Documented On   
2 4:04PM ; [facility]  
  
  
  
                                                    Borderline personality disor  
danny Pt   
reported hx of sx/dx                     Established Patient with Haylienicanor Aguilar LPCC-S                        2022  
  
  
  
                                                    Last Documented On   
2 4:08PM ; [facility]  
  
  
  
                                                    Assessment of body mass inde  
x [Body   
mass index [BMI] 50.0-59.9, adult]      Open Access - Established with Julieth   
Aliya CNP                               2022  
  
  
  
                                                    Last Documented On   
2 7:41PM ; [facility]  
  
  
  
                                                    Diabetes Risk Test Score was  
 three   
score 2022                         Open Access - Established with Julieth   
Aliya CNP                               2022  
  
  
  
                                                    Last Documented On   
2 7:41PM ; [facility]  
  
  
  
                                                    Bipolar I disorder, most rec  
ent   
episode, manic                           Established Patient with Avis   
Felder LPCC-S                          2021  
  
  
  
                                                    Last Documented On   
1 1:35AM ; [facility]  
  
  
  
                                        Borderline personality disorder  Estab  
lished Patient with Avis   
Felder LPCC-S                          2021  
  
  
  
                                                    Last Documented On   
1 1:35AM ; [facility]  
  
  
  
                                        Post-traumatic stress disorder  Establ  
ished Patient with Avis   
Felder LPCC-S                          2021  
  
  
  
                                                    Last Documented On   
1 1:35AM ; [facility]  
  
  
  
                                                    Assessment of visit for: scr  
eening for   
human immunodeficiency virus            Medical Established Patient with   
Doreenpaola Cabrera CNP                        2021  
  
  
  
                                                    Last Documented On   
1 2:56PM ; [facility]  
  
  
  
                                Nicotine dependence Medical Established Patient   
with Doreen Deborah CNP 2021  
  
  
  
                                                    Last Documented On   
1 2:56PM ; [facility]  
  
  
  
                                Tachycardia     Medical Established Patient with  
 Doreen Deborah CNP 2021  
  
  
  
                                                    Last Documented On   
1 2:56PM ; [facility]  
  
  
  
                                                    Z68.43 - Body mass index [BM  
I]   
50.0-59.9, adult                        Medical Established Patient with   
Doreen Deborah CNP                        2021  
  
  
  
                                                    Last Documented On   
1 2:56PM ; [facility]  
  
  
  
                                                    Bipolar I disorder, most rec  
ent   
episode, manic                           Established Patient with Leonie   
Short LISWS                             2021  
  
  
  
                                                    Last Documented On   
1 10:17AM ; [facility]  
  
  
  
                                                    Borderline personality disor  
danny per   
patient reported history                 Established Patient with Leonie   
Short LISWS                             2021  
  
  
  
                                                    Last Documented On   
1 10:17AM ; [facility]  
  
  
  
                                Nicotine dependence  Established Patient with   
Leonie Short LISWS 2021  
  
  
  
                                                    Last Documented On   
1 10:17AM ; [facility]  
  
  
  
                                        Post-traumatic stress disorder  Establ  
ished Patient with Leonie Short   
LISWS                                   2021  
  
  
  
                                                    Last Documented On   
1 10:17AM ; [facility]  
  
  
  
                                Body mass index Medical Established Patient with  
 Doreen Deborah CNP 2021  
  
  
  
                                                    Last Documented On   
1 5:23PM ; [facility]  
  
  
  
                                Morbid obesity  Medical Established Patient with  
 Doreen Deborah CNP 2021  
  
  
  
                                                    Last Documented On   
1 5:23PM ; [facility]  
  
  
  
                                        Nicotine dependence uncomplicated Medica  
l Established Patient with Doreen   
Deborah CNP                              2021  
  
  
  
                                                    Last Documented On   
1 5:23PM ; [facility]  
  
  
  
                                                    Z68.42 - Body mass index [BM  
I]   
45.0-49.9, adult                        Medical Established Patient with   
Doreen Deborah CNP                        2021  
  
  
  
                                                    Last Documented On   
1 5:23PM ; [facility]  
  
  
  
                                Episodic mood disorders On Call with Pamela edwards CNP 2021  
  
  
  
                                                    Last Documented On   
1 7:49AM ; [facility]  
  
  
  
                                                    Mood disorders, NOS per atbatha  
ent   
reported history                         Established Patient with Leonie   
Short LISWS                             2021  
  
  
  
                                                    Last Documented On   
1 7:15PM ; [facility]  
  
  
  
                                        Post-traumatic stress disorder  Establ  
ished Patient with Leonie Short   
LISWS                                   2021  
  
  
  
                                                    Last Documented On   
1 7:15PM ; [facility]  
  
  
  
                                Morbid obesity  Medical Established Patient with  
 Doreen Deborah CNP 2021  
  
  
  
                                                    Last Documented On   
1 12:31PM ; [facility]  
  
  
  
                                Otitis externa  Medical Established Patient with  
 Doreen Deborah CNP 2021  
  
  
  
                                                    Last Documented On   
1 12:31PM ; [facility]  
  
  
  
                                                    Z68.42 - Body mass index [BM  
I]   
45.0-49.9, adult                        Medical Established Patient with   
Doreen Deborah CNP                        2021  
  
  
  
                                                    Last Documented On   
1 12:31PM ; [facility]  
  
  
  
                                        Post-traumatic stress disorder  Establ  
ished Patient with Leonie Short   
LISWS                                   06/10/2021  
  
  
  
                                                    Last Documented On 06/10/202  
1 10:05PM ; [facility]  
  
  
  
                                Morbid obesity  Medical Established Patient with  
 Doreen Deborah CNP 06/10/2021  
  
  
  
                                                    Last Documented On 06/10/202  
1 4:45PM ; [facility]  
  
  
  
                                                    Z68.42 - Body mass index [BM  
I]   
45.0-49.9, adult                        Medical Established Patient with   
Doreenpaola Cabrera CNP                        06/10/2021  
  
  
  
                                                    Last Documented On 06/10/202  
1 4:45PM ; [facility]  
  
  
  
                                        Post-traumatic stress disorder  Establ  
ished Patient with Leonie Short   
LISWS                                   2021  
  
  
  
                                                    Last Documented On   
1 11:59AM ; [facility]  
  
  
  
                                                    Assessment of visit for: scr  
eening for   
human immunodeficiency virus            Medical New Patient with Doreenpaola Cabrera CNP                              2021  
  
  
  
                                                    Last Documented On   
1 4:06PM ; [facility]  
  
  
  
                                                    Diabetes Risk Test Score was  
 one score   
2021                               Medical New Patient with Doreen Cabrera CNP                              2021  
  
  
  
                                                    Last Documented On   
1 4:06PM ; [facility]  
  
  
  
                                Hypertension    Medical New Patient with Doreenpaola esposito CNP 2021  
  
  
  
                                                    Last Documented On   
1 4:06PM ; [facility]  
  
  
  
                                Morbid obesity  Medical New Patient with Doreenpaola esposito CNP 2021  
  
  
  
                                                    Last Documented On   
1 4:06PM ; [facility]  
  
  
  
                                Post-traumatic stress disorder Medical New Patie  
nt with Doreenpaola Cabrera CNP   
2021  
  
  
  
                                                    Last Documented On   
1 4:06PM ; [facility]  
  
  
  
                                                    Z68.42 - Body mass index [BM  
I]   
45.0-49.9, adult                        Medical New Patient with Doreen Cabrera CNP                              2021  
  
  
  
                                                    Last Documented On   
1 4:06PM ; [facility]  
Work Phone: [phone]- Evaluation note  
  
Includes: Assessments for all patient encounters  
  
  
  
                                Findings        Encounter       Date  
   
                                                    Bipolar affective disorder,   
current   
episode depressed, mild                  Established Patient with Leonie   
Short LISWS                             2024  
  
  
  
                                                    Last Documented On   
4 5:16PM ; [facility]  
  
  
  
                                        Post-traumatic stress disorder  Establ  
ished Patient with Leonie Short   
LISWS                                   2024  
  
  
  
                                                    Last Documented On   
4 5:16PM ; [facility]  
  
  
  
                                                    Undifferentiated attention d  
eficit   
disorder                                 Established Patient with   
Leonie Short LISWS                     2024  
  
  
  
                                                    Last Documented On   
4 5:16PM ; [facility]  
  
  
  
                                        Visit for: screening for disorder  Est  
ablished Patient with Leonie   
Short LISWS                             2024  
  
  
  
                                                    Last Documented On   
4 5:16PM ; [facility]  
  
  
  
                                                    [Z68.43 - Body mass index [B  
MI]   
50.0-59.9, adult] assessment of body   
mass index                              Medical Established Patient with   
Doreen Cabrera CNP                        2024  
  
  
  
                                                    Last Documented On   
4 7:50PM ; [facility]  
  
  
  
                                        Attention-deficit hyperactivity disorder  
 Medical Established Patient with   
Doreen Deborah CNP                        2024  
  
  
  
                                                    Last Documented On   
4 7:50PM ; [facility]  
  
  
  
                                                    Diabetes Risk Test Score was  
 three   
score 3/27/2024                         Medical Established Patient with Doreenpaola Cabrera CNP                              2024  
  
  
  
                                                    Last Documented On   
4 7:50PM ; [facility]  
  
  
  
                                        Visit for: screening for STD Medical Est  
ablished Patient with Doreen Cabrera   
CNP                                     2024  
  
  
  
                                                    Last Documented On   
4 7:50PM ; [facility]  
  
  
  
                                                    [Z68.32 - Body mass index [B  
MI]   
32.0-32.9, adult] assessment of body   
mass index                              Medical Established Patient with   
Doreen Cabrera CNP                        2023  
  
  
  
                                                    Last Documented On   
3 6:01PM ; [facility]  
  
  
  
                                        Borderline personality disorder Medical   
Established Patient with Doreen Cabrera CNP                              2023  
  
  
  
                                                    Last Documented On   
3 6:01PM ; [facility]  
  
  
  
                                        Screening for diabetes mellitus Medical   
Established Patient with Doreen Cabrera CNP                              2023  
  
  
  
                                                    Last Documented On   
3 6:01PM ; [facility]  
  
  
  
                                        Assessment of body mass index Medical Es  
tablished Patient with Doreen Cabrera CNP                              10/21/2022  
  
  
  
                                                    Last Documented On 10/21/202  
2 2:45PM ; [facility]  
  
  
  
                                                    Bipolar affective disorder,   
current   
episode manic                            Telebehavioral Health with Haylie   
Aguilar LPCC-S                        2022  
  
  
  
                                                    Last Documented On   
2 3:22PM ; [facility]  
  
  
  
                                                    Bipolar affective disorder,   
current   
episode manic                            Established Patient with Haylie   
Aguilar LPCC-S                        2022  
  
  
  
                                                    Last Documented On   
2 2:28PM ; [facility]  
  
  
  
                                                    Bipolar affective disorder,   
current   
episode depressed, severe with   
psychosis                                Telebehavioral Health with Haylie   
Aguilar LPCC-S                        2022  
  
  
  
                                                    Last Documented On   
2 3:29PM ; [facility]  
  
  
  
                                                    Assessment of body mass inde  
x [Body   
mass index [BMI] 50.0-59.9, adult]      Open Access - Established with Julieth   
Aliya CNP                               2022  
  
  
  
                                                    Last Documented On   
2 9:36AM ; [facility]  
  
  
  
                                                    Bipolar I disorder, most rec  
ent   
episode, manic                          Open Access - Established with Julieth   
Aliya CNP                               2022  
  
  
  
                                                    Last Documented On   
2 9:36AM ; [facility]  
  
  
  
                                        Post-traumatic stress disorder  Establ  
ished Patient with Haylie Aguilar   
LPCC-S                                  2022  
  
  
  
                                                    Last Documented On   
2 3:20PM ; [facility]  
  
  
  
                                No cough        Medical Established Patient with  
 Doreen Cabrera CNP 2022  
  
  
  
                                                    Last Documented On   
2 4:04PM ; [facility]  
  
  
  
                                                    Z68.43 - Body mass index [BM  
I]   
50.0-59.9, adult                        Medical Established Patient with   
Doreen Cabrera CNP                        2022  
  
  
  
                                                    Last Documented On   
2 4:04PM ; [facility]  
  
  
  
                                                    Borderline personality disor  
danny Pt   
reported hx of sx/dx                     Established Patient with Haylie Aguilar LPCC-S                        2022  
  
  
  
                                                    Last Documented On   
2 4:08PM ; [facility]  
  
  
  
                                                    Assessment of body mass inde  
x [Body   
mass index [BMI] 50.0-59.9, adult]      Open Access - Established with Julieth   
Aliya CNP                               2022  
  
  
  
                                                    Last Documented On   
2 7:41PM ; [facility]  
  
  
  
                                                    Diabetes Risk Test Score was  
 three   
score 2022                         Open Access - Established with Julieth   
Aliya CNP                               2022  
  
  
  
                                                    Last Documented On   
2 7:41PM ; [facility]  
  
  
  
                                                    Bipolar I disorder, most rec  
ent   
episode, manic                           Established Patient with Avis   
Felder LPCC-S                          2021  
  
  
  
                                                    Last Documented On   
1 1:35AM ; [facility]  
  
  
  
                                        Borderline personality disorder  Estab  
lished Patient with Avis   
Felder LPCC-S                          2021  
  
  
  
                                                    Last Documented On   
1 1:35AM ; [facility]  
  
  
  
                                        Post-traumatic stress disorder  Establ  
ished Patient with Avis   
Felder LPCC-S                          2021  
  
  
  
                                                    Last Documented On   
1 1:35AM ; [facility]  
  
  
  
                                                    Assessment of visit for: bhargavi myles for   
human immunodeficiency virus            Medical Established Patient with   
Doreen Cabrera CNP                        2021  
  
  
  
                                                    Last Documented On   
1 2:56PM ; [facility]  
  
  
  
                                Nicotine dependence Medical Established Patient   
with Doreenpaola Cabrera CNP 2021  
  
  
  
                                                    Last Documented On   
1 2:56PM ; [facility]  
  
  
  
                                Tachycardia     Medical Established Patient with  
 Doreen Cabrera CNP 2021  
  
  
  
                                                    Last Documented On   
1 2:56PM ; [facility]  
  
  
  
                                                    Z68.43 - Body mass index [BM  
I]   
50.0-59.9, adult                        Medical Established Patient with   
Doreenpaola Cabrera CNP                        2021  
  
  
  
                                                    Last Documented On   
1 2:56PM ; [facility]  
  
  
  
                                                    Bipolar I disorder, most rec  
ent   
episode, manic                           Established Patient with Leonie   
Short LISWS                             2021  
  
  
  
                                                    Last Documented On   
1 10:17AM ; [facility]  
  
  
  
                                                    Borderline personality disor  
danny per   
patient reported history                 Established Patient with Leonie   
Short LISWS                             2021  
  
  
  
                                                    Last Documented On   
1 10:17AM ; [facility]  
  
  
  
                                Nicotine dependence  Established Patient with   
Leonie Short LISWS 2021  
  
  
  
                                                    Last Documented On   
1 10:17AM ; [facility]  
  
  
  
                                        Post-traumatic stress disorder  Establ  
ished Patient with Leonie Short   
LISWS                                   2021  
  
  
  
                                                    Last Documented On   
1 10:17AM ; [facility]  
  
  
  
                                Body mass index Medical Established Patient with  
 Doreen Cabrera CNP 2021  
  
  
  
                                                    Last Documented On   
1 5:23PM ; [facility]  
  
  
  
                                Morbid obesity  Medical Established Patient with  
 Doreen Deborah CNP 2021  
  
  
  
                                                    Last Documented On   
1 5:23PM ; [facility]  
  
  
  
                                        Nicotine dependence uncomplicated Medica  
l Established Patient with Doreen Cabrera CNP                              2021  
  
  
  
                                                    Last Documented On   
1 5:23PM ; [facility]  
  
  
  
                                                    Z68.42 - Body mass index [BM  
I]   
45.0-49.9, adult                        Medical Established Patient with   
Doreenpaola Mccormacken CNP                        2021  
  
  
  
                                                    Last Documented On   
1 5:23PM ; [facility]  
  
  
  
                                Episodic mood disorders On Call with Pamela edwards CNP 2021  
  
  
  
                                                    Last Documented On   
1 7:49AM ; [facility]  
  
  
  
                                                    Mood disorders, NOS per tabatha  
ent   
reported history                         Established Patient with Leonie   
Short LISWS                             2021  
  
  
  
                                                    Last Documented On   
1 7:15PM ; [facility]  
  
  
  
                                        Post-traumatic stress disorder  Establ  
ished Patient with Leonie Short   
LISWS                                   2021  
  
  
  
                                                    Last Documented On   
1 7:15PM ; [facility]  
  
  
  
                                Morbid obesity  Medical Established Patient with  
 Doreen Deborah CNP 2021  
  
  
  
                                                    Last Documented On   
1 12:31PM ; [facility]  
  
  
  
                                Otitis externa  Medical Established Patient with  
 Doreen Deborah CNP 2021  
  
  
  
                                                    Last Documented On   
1 12:31PM ; [facility]  
  
  
  
                                                    Z68.42 - Body mass index [BM  
I]   
45.0-49.9, adult                        Medical Established Patient with   
Doreen Deborah CNP                        2021  
  
  
  
                                                    Last Documented On   
1 12:31PM ; [facility]  
  
  
  
                                        Post-traumatic stress disorder  Establ  
ished Patient with Leonie Short   
LISWS                                   06/10/2021  
  
  
  
                                                    Last Documented On 06/10/202  
1 10:05PM ; [facility]  
  
  
  
                                Morbid obesity  Medical Established Patient with  
 Doreen Deborah CNP 06/10/2021  
  
  
  
                                                    Last Documented On 06/10/202  
1 4:45PM ; [facility]  
  
  
  
                                                    Z68.42 - Body mass index [BM  
I]   
45.0-49.9, adult                        Medical Established Patient with   
Doreen Deborha CNP                        06/10/2021  
  
  
  
                                                    Last Documented On 06/10/202  
1 4:45PM ; [facility]  
  
  
  
                                        Post-traumatic stress disorder  Establ  
ished Patient with Leonie Short   
LISWS                                   2021  
  
  
  
                                                    Last Documented On   
1 11:59AM ; [facility]  
  
  
  
                                                    Assessment of visit for: bhargavi  
eening for   
human immunodeficiency virus            Medical New Patient with Doreenpaola Cabrera CNP                              2021  
  
  
  
                                                    Last Documented On   
1 4:06PM ; [facility]  
  
  
  
                                                    Diabetes Risk Test Score was  
 one score   
2021                               Medical New Patient with Doreen   
Deborah CNP                              2021  
  
  
  
                                                    Last Documented On   
1 4:06PM ; [facility]  
  
  
  
                                Hypertension    Medical New Patient with Doreen C  
cate CNP 2021  
  
  
  
                                                    Last Documented On   
1 4:06PM ; [facility]  
  
  
  
                                Morbid obesity  Medical New Patient with Doreen C  
cate CNP 2021  
  
  
  
                                                    Last Documented On   
1 4:06PM ; [facility]  
  
  
  
                                Post-traumatic stress disorder Medical New Patie  
nt with Doreen Cabrera CNP   
2021  
  
  
  
                                                    Last Documented On   
1 4:06PM ; [facility]  
  
  
  
                                                    Z68.42 - Body mass index [BM  
I]   
45.0-49.9, adult                        Medical New Patient with Doreen Cabrera CNP                              2021  
  
  
  
                                                    Last Documented On   
1 4:06PM ; [facility]  
Work Phone: [phone]- History general Narrative - Reported  
  
Includes: Medical History in patient's chart  
  
  
  
                                        Description         Last Updated  
   
                                        0 previous live birth(s) 2024  
  
  
  
                                                    Last Documented On   
4 7:50PM ; [facility]  
  
  
  
                                        Previously pregnant 1 time(s) 2024  
  
  
  
                                                    Last Documented On   
4 7:50PM ; [facility]  
  
  
  
                                        Recent immunization for flu 2024  
  
  
  
                                                    Last Documented On   
4 7:50PM ; [facility]  
  
  
  
                                        Has sex without a condom 2022  
  
  
  
                                                    Last Documented On   
2 4:04PM ; [facility]  
  
  
  
                                        Not planning to have a baby in the next   
12 months 2022  
  
  
  
                                                    Last Documented On   
2 4:04PM ; [facility]  
  
  
  
                                        Partners sexually transmitted infection   
status known 2022  
  
  
  
                                                    Last Documented On   
2 4:04PM ; [facility]  
  
  
  
                                        No previous hospitalizations 2022  
  
  
  
                                                    Last Documented On   
2 4:04PM ; [facility]  
  
  
  
                                        Chronic illness     2021  
  
  
  
                                                    Last Documented On   
1 7:49AM ; [facility]  
  
  
  
                                        Exposure to COVID-19 2021  
  
  
  
                                                    Last Documented On   
1 12:31PM ; [facility]  
  
  
  
                                        History of gynecologic disorder 20  
21  
  
  
  
                                                    Last Documented On   
1 4:06PM ; [facility]  
  
  
  
                                        History of Polycystic Ovarian Syndrome (  
PCOS) 2021  
  
  
  
                                                    Last Documented On   
1 4:06PM ; [facility]  
  
  
  
                                        History of anxiety disorder NOS 20  
21  
  
  
  
                                                    Last Documented On   
1 4:06PM ; [facility]  
  
  
  
                                        History of migraine headache 2021  
  
  
  
                                                    Last Documented On   
1 4:06PM ; [facility]  
  
  
  
                                        History of psychiatric disorders biopola  
r disorder 2021  
  
  
  
                                                    Last Documented On   
1 4:06PM ; [facility]  
  
[facility]  
Work Phone: [phone]- History general Narrative - Reported  
  
Includes: Medical History in patient's chart  
  
  
  
                                        Description         Last Updated  
   
                                        0 previous live birth(s) 2024  
  
  
  
                                                    Last Documented On   
4 7:50PM ; [facility]  
  
  
  
                                        Previously pregnant 1 time(s) 2024  
  
  
  
                                                    Last Documented On   
4 7:50PM ; [facility]  
  
  
  
                                        Recent immunization for flu 2024  
  
  
  
                                                    Last Documented On   
4 7:50PM ; [facility]  
  
  
  
                                        Has sex without a condom 2022  
  
  
  
                                                    Last Documented On   
2 4:04PM ; [facility]  
  
  
  
                                        Not planning to have a baby in the next   
12 months 2022  
  
  
  
                                                    Last Documented On   
2 4:04PM ; [facility]  
  
  
  
                                        Partners sexually transmitted infection   
status known 2022  
  
  
  
                                                    Last Documented On   
2 4:04PM ; [facility]  
  
  
  
                                        No previous hospitalizations 2022  
  
  
  
                                                    Last Documented On   
2 4:04PM ; [facility]  
  
  
  
                                        Chronic illness     2021  
  
  
  
                                                    Last Documented On   
1 7:49AM ; [facility]  
  
  
  
                                        Exposure to COVID-19 2021  
  
  
  
                                                    Last Documented On   
1 12:31PM ; [facility]  
  
  
  
                                        History of gynecologic disorder 20  
21  
  
  
  
                                                    Last Documented On   
1 4:06PM ; [facility]  
  
  
  
                                        History of Polycystic Ovarian Syndrome (  
PCOS) 2021  
  
  
  
                                                    Last Documented On   
1 4:06PM ; [facility]  
  
  
  
                                        History of anxiety disorder NOS 20  
21  
  
  
  
                                                    Last Documented On   
1 4:06PM ; [facility]  
  
  
  
                                        History of migraine headache 2021  
  
  
  
                                                    Last Documented On   
1 4:06PM ; [facility]  
  
  
  
                                        History of psychiatric disorders biopola  
r disorder 2021  
  
  
  
                                                    Last Documented On   
1 4:06PM ; [facility]  
Work Phone: [phone]- History general Narrative - Reported  
  
Includes: Medical History in patient's chart  
  
  
  
                                        Description         Last Updated  
   
                                        0 previous live birth(s) 2024  
  
  
  
                                                    Last Documented On   
4 7:50PM ; [facility]  
  
  
  
                                        Previously pregnant 1 time(s) 2024  
  
  
  
                                                    Last Documented On   
4 7:50PM ; [facility]  
  
  
  
                                        Recent immunization for flu 2024  
  
  
  
                                                    Last Documented On   
4 7:50PM ; [facility]  
  
  
  
                                        Has sex without a condom 2022  
  
  
  
                                                    Last Documented On   
2 4:04PM ; [facility]  
  
  
  
                                        Not planning to have a baby in the next   
12 months 2022  
  
  
  
                                                    Last Documented On   
2 4:04PM ; [facility]  
  
  
  
                                        Partners sexually transmitted infection   
status known 2022  
  
  
  
                                                    Last Documented On   
2 4:04PM ; [facility]  
  
  
  
                                        No previous hospitalizations 2022  
  
  
  
                                                    Last Documented On   
2 4:04PM ; [facility]  
  
  
  
                                        Chronic illness     2021  
  
  
  
                                                    Last Documented On   
1 7:49AM ; [facility]  
  
  
  
                                        Exposure to COVID-19 2021  
  
  
  
                                                    Last Documented On   
1 12:31PM ; [facility]  
  
  
  
                                        History of gynecologic disorder 20  
21  
  
  
  
                                                    Last Documented On   
1 4:06PM ; [facility]  
  
  
  
                                        History of Polycystic Ovarian Syndrome (  
PCOS) 2021  
  
  
  
                                                    Last Documented On   
1 4:06PM ; [facility]  
  
  
  
                                        History of anxiety disorder NOS 20  
21  
  
  
  
                                                    Last Documented On   
1 4:06PM ; [facility]  
  
  
  
                                        History of migraine headache 2021  
  
  
  
                                                    Last Documented On   
1 4:06PM ; [facility]  
  
  
  
                                        History of psychiatric disorders biopola  
r disorder 2021  
  
  
  
                                                    Last Documented On   
1 4:06PM ; [facility]  
Work Phone: [phone]- Progress note*   
  
Progress note  
  
  
  
                                Date            Encounter       Last Documented   
by  
   
                                2024      Medical Established Patient Last  
 documented on 2024; 7:50 PM,   
Doreen Cabrera CNP; [facility]  
  
  
  
                                                      
  
  
** Active Problems & Conditions **  
- F90.9 - Attention-deficit Hyperactivity Disorder  
- F31.31 - Bipolar I Disorder, Most Recent Episode, Depressed Mild  
- F43.10 - Post-traumatic Stress Disorder  
  
** Chief Complaint **  
The Chief Complaint is: Patient was D/C'd from [facility] in 
[place]   
for no call no show. Patient needs all meds refilled. Ran out 3 days ago.  
  
** Referred Here **  
No prior encounters.  
  
** History of Present Illness **  
Linnette Beckham is a 23 year old female.  
- Allergy list reviewed - Reviewed Medications - Medication list reviewed  
- Date of last menstruation 2024 - Pregnancy test - would not like a 
pregnancy   
test  
Presents to see if we can take over psych meds , got dismissed from [facility] r/t no 
shows.   
has felt stable on meds x 11 months  
  
** Current Medication **  
- ARIPiprazole 5 MG Oral Tablet once daily, 90 days, 0 refills  
- busPIRone HCl 5 MG Oral Tablet one tablet twice daily, 90 days, 0 refills  
- lamoTRIgine 100 MG Oral Tablet once daily, 30 days, 0 refills  
- metFORMIN HCl 500 MG Oral Tablet AM and PM per GYN[facility] in [place], 30 days, 0
   
refills  
- MiraLax 17 GM/SCOOP Oral Powder mix 1 scoop with 8 ounces once daily, 30 days,
 2   
refills  
- Narcan 4 MG/0.1ML Nasal Liquid spray in nostril for symptoms of overdose , may
   
repeat in 2 minutes if symptoms persist, 1 days, 0 refills  
- Prazosin HCl 1 MG Oral Capsule twice daily, 90 days, 0 refills  
- Sertraline HCl 50 MG Oral Tablet Once daily, 30 days, 0 refills  
- traZODone HCl 100 MG Oral Tablet twice daily, 30 days, 0 refills  
  
** Past Medical/Surgical History **  
Reported:  
Has sex without a condom.  
Medical: No previous hospitalizations. Chronic illness and Sexually transmitted   
infection Partners sexually transmitted infection status known.  
Immunization History: Recent immunization for flu.  
Exposure: Exposure to COVID-19.  
Pregnancy: Previously pregnant 1 time(s) and para having 0 live birth(s). Not   
planning a pregnancy in the next year.  
Diagnoses:  
Polycystic Ovarian Syndrome (PCOS).  
Migraine headache.  
Psychiatric disorders biopolar disorder  
Anxiety disorder NOS  
Procedural:  
- Insertion of ear pressure equalization tubes in both ears  
Surgical:  
- Tonsillectomy  
- Tonsillectomy with adenoidectomy  
  
** Social History **  
Environmental Exposure: No secondhand cigarette smoke exposure.  
Behavioral: Not a current tobacco user.  
Tobacco use: Using electronic cigarettes/vaping.  
Alcohol: Not using alcohol.  
Drug Use: Not using drugs denied by patient.  
Sexual: Sexually active age of sexual partner is _____ years old 21, sexual   
orientation Lesbian or David, gender identity Other, and birth control is being   
practiced Not Using - In Same Sex Relationship.  
  
** Allergies **  
- Abilify Reaction: groggy  
- Augmentin Reaction: Shock  
- metformin Reaction: Nausea, Vomiting  
- trazodone Reaction: Panic attack  
- Zoloft Reaction: slow/ groggy  
  
** Family History **  
Paternal:  
Systemic hypertension  
Oncologic disorder  
Maternal:  
Systemic hypertension  
Epilepsy and recurrent seizures  
Psychiatric disorders  
Oncologic disorder  
Fraternal:  
Psychiatric disorders  
  
** Review Of Systems **  
Head: No head symptoms.  
Neck: No neck symptoms.  
Eyes: No eye symptoms.  
Otolaryngeal: No ear symptoms, no nasal symptoms, no nose and sinus finding, no   
throat symptoms, no oral cavity symptoms, and no jaw symptoms.  
Breasts: No breast symptoms.  
Cardiovascular: No cardiovascular symptoms and no chest pain or discomfort.  
Pulmonary: No pulmonary symptoms.  
Gastrointestinal: No gastrointestinal symptoms.  
Genitourinary: No genitourinary symptoms.  
Musculoskeletal: No musculoskeletal symptoms.  
Neurological: No neurological symptoms.  
Psychological: Easily distracted.  
Skin: No skin symptoms.  
  
** Physical Findings **  
- Vitals taken 2024 07:09 pm  
BP-Sitting R134/88 mmHg  
BP Cuff SizeLarge  
Pulse Rate-Eieacvg841 bpm  
Qkoqfj71 in  
Ezeuru108 lbs  
Body Mass Index52.7 kg/m2  
Body Surface Area2.3 m2  
Oxygen Jgekyscnkb46 %  
  
Vital Signs:  
- Systolic blood pressure 130 - 139 mmHg.  
- Diastolic blood pressure 80-89 mmHg.  
General Appearance:  
- Awake. - Alert. - Well developed. - Well nourished. - In no acute distress.  
Lungs:  
- Respiration rhythm and depth was normal. - Clear to auscultation.  
Cardiovascular:  
Heart Rate And Rhythm: - Normal.  
Heart Sounds: - Normal.  
Murmurs: - No murmurs were heard.  
Abdomen:  
Visual Inspection: - Abdomen was normal on visual inspection.  
Musculoskeletal System:  
General/bilateral: - Normal movement of all extremities.  
Skin:  
- General appearance was normal.  
  
** Tests **  
Blood Analysis:  
Hemoglobin Studies: ValueDate  
Blood hemoglobin A1c 5.5%3/27/2024  
Hemoglobin A1c level < 7.0%.  
Blood Endocrine Laboratory Tests: Value  
Blood glucose level by fingerstick Non-fasting 96 mg/dl  
Laboratory-based Chemistry:  
Other Laboratory Tests:  
Screening for sexually transmitted infections was performed.  
  
** Assessment **  
- Z11.3 - Encounter for screening for infections with a predominantly sexual 
mode of   
transmission  
- Z68.43 - Body mass index [BMI] 50.0-59.9, adult  
- Z13.1 - Encounter for screening for diabetes mellitus  
- F90.9 - Attention-deficit hyperactivity disorder, unspecified type  
  
** Vaccinations **  
- 1st Dose PFIZER COVID-19 Vaccine Dose #1 Status: Prev Hist Date: 2021  
- 1st Dose PFIZER COVID-19 Vaccine Dose #2 Status: Prev Hist Date: 2021  
- DTP Dose #1 Status: Prev Hist Date: 1999  
- Hep B, adolescent or pediatric (Engerix-B) Dose #1 Status: Prev Hist Date:   
1999  
- Hep B, adolescent or pediatric (Engerix-B) Dose #2 Status: Prev Hist Date:   
1999  
- Hib-Hep B (Comvax) Dose #1 Status: Prev Hist Date: 2000  
- IPV (IPOL) Dose #1 Status: Prev Hist Date: 1999  
- IPV (IPOL) Dose #2 Status: Prev Hist Date: 2000  
- IPV (IPOL) Dose #3 Status: Prev Hist Date: 2004  
- MMR (MMR-II) Dose #1 Status: Prev Hist Date: 2000  
- MMR (MMR-II) Dose #2 Status: Prev Hist Date: 2004  
- Meningococcal MCV4O Dose #1 Status: Prev Hist Date: 2016  
- OPV Dose #1 Status: Prev Hist Date: 1999  
- Tdap (Boostrix) Dose #1 Status: Prev Hist Date: 2010  
  
- Received dose of Reported: Patient has received the COVID Vaccine  
  
** Counseling/Education **  
- Wishing to stop using electronic cigarettes/vaping  
- Discussed nutritional needs teach healthy choices including fruits and 
vegetables  
- Patient education about a proper diet  
- Not requesting contraception  
- Discussed concerns about exercise: promote physical activity  
  
** Plan **  
StartCited- Attention-deficit hyperactivity disorder, unspecified type  
Intuniv 1 MG tablet take 1 tablet by mouth once daily at bedtime, 30 days, 5 
refills  
EndCited  
StartCited- Encntr screen for infections w sexl mode of transmiss  
Outside Labs/Microbiology: All 3 Urine Test Gonorrhea-Chlamydia-Trichomonas  
EndCited  
StartCited- Other  
Follow-up Appointment  
2 month med check  
ARIPiprazole 5 MG tablet take 1 tablet by mouth once daily, 30 days, 5 refills  
busPIRone HCl 5 MG tablet take 1 tablet by mouth twice daily, 30 days, 5 refills  
lamoTRIgine 100 MG tablet take 1 tablet by mouth once daily, 30 days, 5 refills  
Prazosin HCl 1 MG capsule take 1 capsule by mouth twice daily, 30 days, 5 
refills  
Sertraline HCl 50 MG tablet take 1 tablet by mouth once daily, 30 days, 5 
refills  
traZODone HCl 100 MG tablet take 1 tablet by mouth twice daily, 30 days, 5 
refills  
EndCited  
** Care Team **  
- Doreen Cabrera CNP  
  
** Health Reminders **  
- Assess BMI satisfied 2024.  
- Assess Tobacco Use satisfied 2024.  
- Diabetes Risk Screening Needed satisfied 2024.  
- Follow Up Plan BMI Management satisfied 2024.  
- Smoking & Tobacco Cessation Intervention and Counseling satisfied 2024.  
  
** User Defined 1 **  
Not planning a pregnancy in the next year.  
No (0 points) [Pre-DM]: No, patient has not been diagnosed with high blood 
pressure,   
No (0 points) [Pre-DM]: Patient has not been diagnosed with gestational diabetes
or   
given birth to a baby weighing 9 pounds or more, No (0 points) [Pre-DM]: No, 
mother,   
father, sister, or brother does not have DM, No (1 point) [Pre-DM]: No, not   
physically active, and Woman (0 Points) [Pre-DM].  
Less than 40 years (0 points) [Pre-DM].  
  
  
[facility]03- Progress note*   
  
Progress note  
  
  
  
                                Date            Encounter       Last Documented   
by  
   
                                2024       Established Patient Last docu  
mented on 2024; 5:16 PM,   
Leonie HUTCHINSON; [facility]  
  
  
  
                                                      
  
  
** Active Problems & Conditions **  
- F90.9 - Attention-deficit Hyperactivity Disorder  
- F31.31 - Bipolar I Disorder, Most Recent Episode, Depressed Mild  
- F43.10 - Post-traumatic Stress Disorder  
  
** Chief Complaint **  
The Chief Complaint is: [facility] met with patient to follow-up regarding mood and   
medications and discuss PHQ score of 2. Patient reports recently getting 
dismissed   
from services at a [facility] in [place] due to missing 
appointments.   
Patient reports being on a good medication regimen and feels that moods have 
been   
stable, sober from drugs for 6-7 months, in a healthy relationship and engaging 
with   
family and friends. Patient reports being diagnosed with ADHD but not treated at
 this   
time and would like to be due to concentration and focus issues. Patient reports
 no   
thoughts of harm toward self or others.  
  
** History of Present Illness **  
Linnette Beckham is a 23 year old female.  
- No Irritability - Normal appetite  
- Anxiety - Energy level is good - Easily distracted - Racing thoughts - No   
depression - No sleep disturbances - No loss of interest in activities - Not 
feeling   
guilty - No social isolation - No impulsive behavior - No high involvement in   
pleasurable activities  
  
** Current Medication **  
- ARIPiprazole 5 MG Oral Tablet take 1 tablet by mouth once daily, 30 days, 5 
refills  
- ARIPiprazole 5 MG Oral Tablet once daily, 90 days, 0 refills  
- busPIRone HCl 5 MG Oral Tablet take 1 tablet by mouth twice daily, 30 days, 5   
refills  
- busPIRone HCl 5 MG Oral Tablet one tablet twice daily, 90 days, 0 refills  
- Intuniv 1 MG Oral Tablet Extended Release 24 Hour take 1 tablet by mouth once 
daily   
at bedtime, 30 days, 5 refills  
- lamoTRIgine 100 MG Oral Tablet take 1 tablet by mouth once daily, 30 days, 5   
refills  
- lamoTRIgine 100 MG Oral Tablet once daily, 30 days, 0 refills  
- metFORMIN HCl 500 MG Oral Tablet AM and PM per GYN/[facility] in [place], 30 days, 0
   
refills  
- MiraLax 17 GM/SCOOP Oral Powder mix 1 scoop with 8 ounces once daily, 30 days,
 2   
refills  
- Narcan 4 MG/0.1ML Nasal Liquid spray in nostril for symptoms of overdose , may
   
repeat in 2 minutes if symptoms persist, 1 days, 0 refills  
- Prazosin HCl 1 MG Oral Capsule take 1 capsule by mouth twice daily, 30 days, 5
   
refills  
- Prazosin HCl 1 MG Oral Capsule twice daily, 90 days, 0 refills  
- Sertraline HCl 50 MG Oral Tablet take 1 tablet by mouth once daily, 30 days, 5
   
refills  
- Sertraline HCl 50 MG Oral Tablet Once daily, 30 days, 0 refills  
- traZODone HCl 100 MG Oral Tablet take 1 tablet by mouth twice daily, 30 days, 
5   
refills  
- traZODone HCl 100 MG Oral Tablet twice daily, 30 days, 0 refills  
  
** Social History **  
Environmental Exposure: No secondhand cigarette smoke exposure.  
Personal: Recent emotional stress due to running out of medications recently.  
Behavioral: Not a current tobacco user.  
Tobacco use: Using electronic cigarettes/vaping.  
Alcohol: Not using alcohol.  
Drug Use: Recovering from drug addiction. Not using drugs.  
  
** Physical Findings **  
General Appearance:  
- Normal Appearance.  
Neurological:  
- Estimated intelligence was normal. - Oriented to time, place, and person. - No
   
hallucinations. - Judgement was not impaired.  
Speech: - Is Normal.  
Psychiatric:  
- Mood is Euthymic. - Attitude Open.  
Demonstrated Behavior: - Motor Activity Normal Activity. - Eye Contact 
Appropriate.  
Affect: - Congruent with the mood.  
Thought Content: - Insight was intact. - No delusions. - No suicidal ideation. -
 No   
Passive thoughts of Death. - No suicidal plans. - No suicidal intent. - No 
homicidal   
ideations. - No homicidal plans. - No homicidal intent.  
Past Medical:  
- No repetitive self injurious behavior.  
  
** Assessment **  
- Z13.89 - Encounter for screening for other disorder  
- F90.9 - Attention-deficit hyperactivity disorder, unspecified type  
- F31.31 - Bipolar disorder, current episode depressed, mild  
- F43.10 - Post-traumatic stress disorder, unspecified  
  
** Therapy **  
- Developmental/Behavioral Screening & Testing - PHQ9.  
- SBIRT Full Screen Neg.  
- Brief solution-focused therapy.  
-  Visit 30 Minutes.  
- Plan - PCP to continue current medications and start Intuniv 1 mg daily. 
Patient   
agreeable to plan and Collaborated with patient and provider:  
  
** Counseling/Education **  
P offered active and supportive listening and processed current stressors 
related   
to getting medications.  
BHP discussed coping skills and supports to implement in daily routine.  
P discussed progress patient has felt they have made recently and encouraged   
continued follow-up with providers to address health.  
  
** Plan **  
Patient to take medications as prescribed and contact the office with any 
questions   
or concerns.  
Patient to implement coping skills and positive supports as discussed.  
P to attempt to follow-up with patient via phone in 2 weeks and at next in 
person   
visit as scheduled.  
  
** Care Team **  
- Doreen Cabrera CNP  
  
** Health Reminders **  
- Assess Tobacco Use satisfied 2024.  
- ESTHELA-2 satisfied 2024.  
- PHQ9 / PHQA satisfied 2024.  
- SBIRT satisfied 2024.  
  
** User Defined 1 **  
Not afraid of someone you have a relationship with No.  
Has lack of transportation kept patient from medical appointments or from 
getting   
medications: No and lack of transportation has kept patient from beneficial   
non-medical activities: No.  
She has not had 4 or more drinks in a day within the past year. Do you feel 
stress -   
tense, restless, nervous, or anxious, or unable to sleep at night because your 
mind   
is troubled all the time - these days? A little bit. No misuse of prescription 
only   
drugs. Illicit drug use and Does patient feel physically and emotionally safe 
where   
he/she lives: Yes. Is patient is worried about losing housing: No. What is the   
highest grade or level of school you have completed or the highest degree you 
have   
received? More than high school. In the past year, patient or family members in   
household were unable to get needed clothing: No, unable to get needed child 
care:   
No, unable to get other needs No, unable to get needed phone: No, unable to get   
needed utilities: No, unable to get needed Medicine or Health Care: No, and In 
the   
past year, patient or family members in household were unable to get needed 
food: No.   
How often does patient see or talk to people that that he/she cares about and 
feels   
close to: 5 or more times a week.  
ESTHELA-2 score was two 3/27/2024, ESTHELA-7 score [ESTHELA-7] Feeling nervous, anxious or 
on   
edge? + 2 pt : More than half the days, [ESTHELA-7] Not being able to stop or 
control   
worrying? + 0 pt : Not at all, Patient Health Questionnaire 9-Item total score 
was   
two 3/27/2024 If you checked off problems, how difficult is it for you to do 
your   
work? + : Somewhat difficult, [PHQ-9-1] Little interest or pleasure in doing 
things?   
+ 0 pt : Not at all, [PHQ-9-2] Feeling down, depressed, or hopeless? + 0 pt : 
Not at   
all, [PHQ-9-3] Trouble falling or staying asleep or sleeping too much? + 0 pt : 
Not   
at all, [PHQ-9-4] Feeling tired or having little energy? + 0 pt : Not at all,   
[PHQ-9-5] Poor appetite or overeating? + 0 pt : Not at all, [PHQ-9-6] Feeling 
bad   
about yourself-or that you are a failure + 0 pt : Not at all, [PHQ-9-7] Trouble   
concentrating on things such as reading the newspaper + 2 pt : More than half 
the   
days, [PHQ-9-8] Moving or speaking so slowly that other people have noticed. + 0
 pt :   
Not at all, and [PHQ-9-9] Thoughts that you would be better off dead or hurting   
yourself? + 0 pt : Not at all.  
  
  
[facility]07- Progress note*   
  
Progress note  
  
  
  
                                Date            Encounter       Last Documented   
by  
   
                                2023      Medical Established Patient Last  
 documented on 2023; 6:01 PM,   
Doreen Cabrera CNP; [facility]  
  
  
  
                                                      
  
  
** Active Problems & Conditions **  
- F31.10 - Bipolar I Disorder, Most Recent Episode, Manic  
- F60.3 - Borderline Personality Disorder  
- F43.10 - Post-traumatic Stress Disorder  
  
** Chief Complaint **  
The Chief Complaint is: Yearly follow up/would like referral to psychiatry (not 
Dr. Sullivan).  
  
** Referred Here **  
No prior encounters.  
  
** History of Present Illness **  
Linnette Beckham is a 23 year old female.  
- Allergy list reviewed - Reviewed Medications not taking any medications -   
Medication list reviewed  
Presents to get psych referral  anyone but dr sullivan  we had tried dr alonso in 
the   
past who was not accepting pts at that time. bh not in house and pt felt she 
didnt   
need to speak to the outside provider covering   I have a counselor   
  
Yearly follow up, would like a referral to psychiatry/not Dr. Sullivan, she does see
   
Haylie Almendarez a counselor at [facility]  
Currently only taking Metformin d/t PCOS  
  
** Current Medication **  
- Aldactazide 50-50 MG Oral Tablet take 1 tablet by mouth once daily, 30 days, 
11   
refills  
- metFORMIN HCl 500 MG Oral Tablet AM and PM per GYN/[facility] in [place], 30 days, 0
   
refills  
- MiraLax 17 GM/SCOOP Oral Powder mix 1 scoop with 8 ounces once daily, 30 days,
 2   
refills  
- Narcan 4 MG/0.1ML Nasal Liquid spray in nostril for symptoms of overdose , may
   
repeat in 2 minutes if symptoms persist, 1 days, 0 refills  
  
** Past Medical/Surgical History **  
Reported:  
Has sex without a condom.  
Medical: No previous hospitalizations. Chronic illness and Sexually transmitted   
infection Partners sexually transmitted infection status known.  
Exposure: Exposure to COVID-19.  
Pregnancy: Previously pregnant 0 time(s). Not planning to have a baby in the 
next 12   
months.  
Diagnoses:  
Polycystic Ovarian Syndrome (PCOS).  
Migraine headache.  
Psychiatric disorders biopolar disorder  
Anxiety disorder NOS  
Procedural:  
- Insertion of ear pressure equalization tubes in both ears  
Surgical:  
- Tonsillectomy  
- Tonsillectomy with adenoidectomy  
  
** Social History **  
Environmental Exposure: No secondhand cigarette smoke exposure.  
Behavioral: Not a current tobacco user.  
Tobacco use: Not using electronic cigarettes/vaping.  
Alcohol: Not using alcohol.  
Drug Use: Using marijuana.  
Sexual: Sexual orientation Other and gender identity Other.  
  
** Allergies **  
- Abilify Reaction: groggy  
- Augmentin Reaction: Shock  
- metformin Reaction: Nausea, Vomiting  
- trazodone Reaction: Panic attack  
- Zoloft Reaction: slow/ groggy  
  
** Family History **  
Paternal:  
Systemic hypertension  
Oncologic disorder  
Maternal:  
Systemic hypertension  
Epilepsy and recurrent seizures  
Psychiatric disorders  
Oncologic disorder  
Fraternal:  
Psychiatric disorders  
  
** Review Of Systems **  
Head: No head symptoms.  
Neck: No neck symptoms.  
Eyes: No eye symptoms.  
Otolaryngeal: No ear symptoms, no nasal symptoms, no nose and sinus finding, no   
throat symptoms, no oral cavity symptoms, and no jaw symptoms.  
Breasts: No breast symptoms.  
Cardiovascular: No cardiovascular symptoms and no chest pain or discomfort.  
Pulmonary: No pulmonary symptoms.  
Gastrointestinal: No gastrointestinal symptoms.  
Genitourinary: No genitourinary symptoms.  
Musculoskeletal: No musculoskeletal symptoms.  
Neurological: No neurological symptoms.  
Psychological: No psychological symptoms.  
Skin: No skin symptoms.  
Cardiovascular: Edema not present.  
  
** Physical Findings **  
- Vitals taken 2023 05:08 pm  
BP-Sitting L111/76 mmHg  
BP Cuff SizeLarge  
Pulse Rate-Quhiztv76 bpm  
Temp-Oral98.2 F  
Xhbqha25 in  
Qnqcvn002 lbs  
Body Mass Index32.8 kg/m2  
Body Surface Area1.9 m2  
Oxygen Zubhbgfcvj86 %  
  
Vital Signs:  
- Systolic blood pressure < 130 mmHg.  
- Diastolic Blood Pressure < 80 mmHg.  
General Appearance:  
- Awake. - Alert. - Well developed. - Well nourished. - In no acute distress.  
Lungs:  
- Respiration rhythm and depth was normal. - Clear to auscultation.  
Cardiovascular:  
Heart Rate And Rhythm: - Normal.  
Heart Sounds: - Normal.  
Murmurs: - No murmurs were heard.  
Thrill: - No thrill.  
Abdomen:  
Visual Inspection: - Abdomen was normal on visual inspection.  
Musculoskeletal System:  
General/bilateral: - Abnormal movement of all extremities.  
Skin:  
- General appearance was normal.  
  
** Tests **  
Blood Analysis:  
Hemoglobin Studies: ValueDate  
Blood hemoglobin A1c 5.3%2023  
Hemoglobin A1c level < 7.0%.  
Blood Endocrine Laboratory Tests: Value  
Blood glucose level by fingerstick Non-fasting 114 mg/dl  
  
** Assessment **  
- Z68.32 - Body mass index [BMI] 32.0-32.9, adult  
- F60.3 - Borderline personality disorder  
- Z13.1 - Encounter for screening for diabetes mellitus  
  
** Therapy **  
- Patient has agreed to receive flu vaccine today.  
  
** Vaccinations **  
- Received dose of Reported: Patient has received the COVID Vaccine  
  
** Counseling/Education **  
- Discussed nutritional needs teach healthy choices including fruits and 
vegetables  
- Patient education about a proper diet  
- Discussed concerns about exercise: promote physical activity  
  
** Plan **  
StartCited- Borderline personality disorder  
Referrals: Psychiatry  
Instructions: Please make a referral to: see if dr alonso accepting now, 
otherwise ok   
with arminda or edy  
EndCited  
StartCited- Essential (primary) hypertension  
Aldactazide 50-50 MG tablet take 1 tablet by mouth once daily, 30 days, 11 
refills  
EndCited  
StartCited- Other  
Follow-up Appointment  
f/u phone call 1 month  
EndCited  
** Health Reminders **  
- Assess BMI satisfied 2023.  
- Assess Tobacco Use satisfied 2023.  
- Follow Up Plan BMI Management satisfied 2023.  
  
  
[facility]- Evaluation note  
  
Includes: Assessments for all patient encounters  
  
  
  
                                Findings        Encounter       Date  
   
                                        Assessment of body mass index Medical Es  
tablished Patient with   
Doreen Cabrera [facility]                        10/21/2022  
   
                                                    Bipolar affective disorder,   
current   
episode manic                            Telebehavioral Health with Haylie Martinerson [facility]                        2022  
   
                                                    Bipolar affective disorder,   
current   
episode manic                            Established Patient with Haylie Martinerson [facility]                        2022  
   
                                                    Bipolar affective disorder,   
current   
episode depressed, severe with   
psychosis                                Telebehavioral Health with Haylie Martinerson [facility]                        2022  
   
                                                    Assessment of body mass inde  
x [Body   
mass index [BMI] 50.0-59.9, adult]      Open Access - Established with   
Julieth Pisano CNP                        2022  
   
                                                    Bipolar I disorder, most rec  
ent   
episode, manic                          Open Access - Established with   
Julieth Aliya CNP                        2022  
   
                                        Post-traumatic stress disorder  Establ  
ished Patient with Haylie Martinerson [facility]                        2022  
   
                                        No cough            Medical Established   
Patient with   
Doreen Cabrera [facility]                        2022  
   
                                                    Z68.43 - Body mass index [BM  
I]   
50.0-59.9, adult                        Medical Established Patient with   
Doreen Mccormacken [facility]                        2022  
   
                                                    Borderline personality disor  
danny Pt   
reported hx of sx/dx                     Established Patient with [facility]                        2022  
   
                                                    Assessment of body mass inde  
x [Body   
mass index [BMI] 50.0-59.9, adult]      [facility] - Established with   
Julieth Aliya CNP                        2022  
   
                                                    Diabetes Risk Test Score was  
 three   
score 2022                         Open Access - Established with   
Julieth Aliya CNP                        2022  
   
                                                    Bipolar I disorder, most rec  
ent   
episode, manic                           [facility]   
[facility]  
   
                                        Borderline personality disorder  Estab  
lished Patient with   
[facility]                2021  
   
                                        Post-traumatic stress disorder  Establ  
ished Patient [facility]   
[facility]  
   
                                                    Assessment of visit for: bhargavi myles for   
human immunodeficiency virus            Medical Established Patient with   
Doreen Deborah CNP                        2021  
   
                                        Nicotine dependence Medical Established   
Patient with   
Doreen Deborah [facility]                        2021  
   
                                        Tachycardia         Medical Established   
Patient with   
Doreen Deborah [facility]                        2021  
   
                                                    Z68.43 - Body mass index [BM  
I]   
50.0-59.9, adult                        Medical Established Patient with   
Doreen Deborah [facility]                        2021  
   
                                                    Bipolar I disorder, most rec  
ent   
episode, manic                           Established Patient with Leonie   
[facility]                             2021  
   
                                                    Borderline personality disor  
danny per   
patient reported history                 Established Patient with Leonie   
[facility]                             2021  
   
                                        Nicotine dependence  Established Patie  
nt with Leonie   
[facility]                             2021  
   
                                        Post-traumatic stress disorder  Establ  
ished Patient with Leonie   
[facility]                             2021  
   
                                        Body mass index     Medical Established   
Patient with   
Doreen Deborah [facility]                        2021  
   
                                        Morbid obesity      Medical Established   
Patient with   
Doreen Deborah [facility]                        2021  
   
                                        Nicotine dependence uncomplicated Medica  
l Established Patient with   
Doreen Deborah [facility]                        2021  
   
                                                    Z68.42 - Body mass index [BM  
I]   
45.0-49.9, adult                        Medical Established Patient with   
Doreen Deborah CNP                        2021  
   
                                Episodic mood disorders On Call with Pamela edwards CNP 2021  
   
                                                    Mood disorders, NOS per tabatha  
ent   
reported history                         Established Patient with Leonie   
[facility]                             2021  
   
                                        Post-traumatic stress disorder BH Establ  
ished Patient with Leonie   
[facility]                             2021  
   
                                        Morbid obesity      Medical Established   
Patient with   
Doreen Deborah CNP                        2021  
   
                                        Otitis externa      Medical Established   
Patient with   
Doreen Deborah CNP                        2021  
   
                                                    Z68.42 - Body mass index [BM  
I]   
45.0-49.9, adult                        Medical Established Patient with   
Doreen Deborah CNP                        2021  
   
                                        Post-traumatic stress disorder BH Establ  
ished Patient with Leonie   
Short [facility]                             06/10/2021  
   
                                        Morbid obesity      Medical Established   
Patient with   
Doreen Deborah CNP                        06/10/2021  
   
                                                    Z68.42 - Body mass index [BM  
I]   
45.0-49.9, adult                        Medical Established Patient with   
Doreen Deborah CNP                        06/10/2021  
   
                                        Post-traumatic stress disorder BH Establ  
ished Patient with Leonie   
Short [facility]                             2021  
   
                                                    [facility]   
[facility]            Medical New Patient with Doreen   
Deborah CNP                              2021  
   
                                                    Diabetes Risk Test Score was  
 one score   
2021                               Medical New Patient with Doreen   
Deborah CNP                              2021  
   
                                        Hypertension        Medical New Patient   
with Doreen   
Deborah CNP                              2021  
   
                                        Morbid obesity      Medical New Patient   
with Doreen   
Deborah CNP                              2021  
   
                                        Post-traumatic stress disorder Medical N  
ew Patient with Doreen   
Deborah CNP                              2021  
   
                                                    Z68.42 - Body mass index [BM  
I]   
45.0-49.9, adult                        Medical New Patient with Doreen   
Deborah [facility]                              2021  
  
[facility]  
Work Phone: [phone]- Evaluation note  
  
Includes: Assessments for all patient encounters  
  
  
  
                                Findings        Encounter       Date  
   
                                                    Bipolar affective disorder,   
current   
episode manic                            Telebehavioral Health with Haylie Aguilar [facility]                        2022  
   
                                                    Bipolar affective disorder,   
current   
episode manic                            Established Patient with Haylienicanor Aguilar [facility]                        2022  
   
                                                    Bipolar affective disorder,   
current   
episode depressed, severe with   
psychosis                                Telebehavioral Health with Haylie Aguilar [facility]                        2022  
   
                                                    Assessment of body mass inde  
x [Body   
mass index [BMI] 50.0-59.9, adult]      Open Access - Established with   
Julieth Pisano CNP                        2022  
   
                                                    Bipolar I disorder, most rec  
ent   
episode, manic                          Open Access - Established with   
Julieth Aliya CNP                        2022  
   
                                        Post-traumatic stress disorder  Establ  
ished Patient with Haylie   
[facility]gu[facility]                        2022  
   
                                        No cough            Medical Established   
Patient with   
Doreen Cabrera CNP                        2022  
   
                                                    Z68.43 - Body mass index [BM  
I]   
50.0-59.9, adult                        Medical Established Patient with   
Doreenpaola Cabrera CNP                        2022  
   
                                                    Borderline personality disor  
danny Pt   
reported hx of sx/dx                     Established Patient with Haylie   
Aguilar [facility]                        2022  
   
                                                    Assessment of body mass inde  
x [Body   
mass index [BMI] 50.0-59.9, adult]      Open Access - Established with   
Julieth Aliya CNP                        2022  
   
                                                    Diabetes Risk Test Score was  
 three   
score 2022                         Open Access - Established with   
Julieth Aliya CNP                        2022  
   
                                                    Bipolar I disorder, most rec  
ent   
episode, manic                           Established Patient with   
Avis Felder [facility]                2021  
   
                                        Borderline personality disorder  Estab  
lished Patient with   
Avis Felder [facility]                2021  
   
                                        Post-traumatic stress disorder  Establ  
ished Patient with   
Avis Felder [facility]                2021  
   
                                                    Assessment of visit for: bhargavi myles for   
human immunodeficiency virus            Medical Established Patient with   
Doreen Deborah CNP                        2021  
   
                                        Nicotine dependence Medical Established   
Patient with   
Doreen Deborah CNP                        2021  
   
                                        Tachycardia         Medical Established   
Patient with   
Doreen Deborah [facility]                        2021  
   
                                                    Z68.43 - Body mass index [BM  
I]   
50.0-59.9, adult                        Medical Established Patient with   
Doreen Deborah CNP                        2021  
   
                                                    Bipolar I disorder, most rec  
ent   
episode, manic                           Established Patient with Leonie   
Short [facility]                             2021  
   
                                                    Borderline personality disor  
danny per   
patient reported history                BH Established Patient with Leonie   
Short [facility]                             2021  
   
                                        Nicotine dependence  Established Patie  
nt with Leonie   
Short [facility]                             2021  
   
                                        Post-traumatic stress disorder  Establ  
ished Patient with Leonie   
Short [facility]                             2021  
   
                                        Body mass index     Medical Established   
Patient with   
Doreen Deborah CNP                        2021  
   
                                        Morbid obesity      Medical Established   
Patient with   
Doreen Deborah CNP                        2021  
   
                                        Nicotine dependence uncomplicated Medica  
l Established Patient with   
Doreen Deborah CNP                        2021  
   
                                                    Z68.42 - Body mass index [BM  
I]   
45.0-49.9, adult                        Medical Established Patient with   
Doreen Deborah CNP                        2021  
   
                                Episodic mood disorders On Call with Pamela edwards CNP 2021  
   
                                                    Mood disorders, NOS per tabatha  
ent   
reported history                         Established Patient with Leonie   
Short [facility]                             2021  
   
                                        Post-traumatic stress disorder  Establ  
ished Patient with Leonie   
Short [facility]                             2021  
   
                                        Morbid obesity      Medical Established   
Patient with   
Doreen Deborah CNP                        2021  
   
                                        Otitis externa      Medical Established   
Patient with   
Doreen Deborah CNP                        2021  
   
                                                    Z68.42 - Body mass index [BM  
I]   
45.0-49.9, adult                        Medical Established Patient with   
Doreen Deborah CNP                        2021  
   
                                        Post-traumatic stress disorder  Establ  
ished Patient with Leonie   
Short [facility]                             06/10/2021  
   
                                        Morbid obesity      Medical Established   
Patient with   
Doreen Deborah CNP                        06/10/2021  
   
                                                    Z68.42 - Body mass index [BM  
I]   
45.0-49.9, adult                        Medical Established Patient with   
Doreen Deborah CNP                        06/10/2021  
   
                                        Post-traumatic stress disorder  Establ  
ished Patient with Leonie   
Short [facility]                             2021  
   
                                                    Assessment of visit for: bhargavi myles for   
human immunodeficiency virus            Medical New Patient with Doreen   
Deborah [facility]                              2021  
   
                                                    Diabetes Risk Test Score was  
 one score   
2021                               Medical New Patient with Doreen   
Deborah [facility]                              2021  
   
                                        Hypertension        Medical New Patient   
with Doreen   
Deborah CNP                              2021  
   
                                        Morbid obesity      Medical New Patient   
with Doreen   
Deborah [facility]                              2021  
   
                                        Post-traumatic stress disorder Medical N  
ew Patient with Doreen   
Deborah [facility]                              2021  
   
                                                    Z68.42 - Body mass index [BM  
I]   
45.0-49.9, adult                        Medical New Patient with Doreen   
Deborah [facility]                              2021  
  
[facility] [place]  
Work Phone: [phone]- Evaluation note  
  
Includes: Assessments for all patient encounters  
  
  
  
                                Findings        Encounter       Date  
   
                                                    Bipolar affective disorder,   
current   
episode depressed, severe with   
psychosis                               BH Tele[facility]al Health with Haylie Aguilar [facility]                        2022  
   
                                                    Assessment of body mass inde  
x [Body   
mass index [BMI] 50.0-59.9, adult]      Open Access - Established with   
Julieth Aliya CNP                        2022  
   
                                        Post-traumatic stress disorder  Establ  
ished Patient with Haylienicanor Aguilar [facility]                        2022  
   
                                        No cough            Medical Established   
Patient with   
Doreenpaola Mccormacken CNP                        2022  
   
                                                    Z68.43 - Body mass index [BM  
I]   
50.0-59.9, adult                        Medical Established Patient with   
Doreen Deborah CNP                        2022  
   
                                                    Borderline personality disor  
danny Pt   
reported hx of sx/dx                     Established Patient with Haylie Aguilar [facility]                        2022  
   
                                                    Assessment of body mass inde  
x [Body   
mass index [BMI] 50.0-59.9, adult]      Open Access - Established with   
Julieth Aliya CNP                        2022  
   
                                                    Diabetes Risk Test Score was  
 three   
score 2022                         Open Access - Established with   
Julieth Aliya CNP                        2022  
   
                                                    Bipolar I disorder, most rec  
ent   
episode, manic                           Established Patient with   
Avis Felder [facility]                2021  
   
                                        Borderline personality disorder  Estab  
lished Patient with   
Avis Felder [facility]                2021  
   
                                        Post-traumatic stress disorder  Establ  
ished Patient with   
Avis Felder [facility]                2021  
   
                                                    Assessment of visit for: bhargavi myles for   
human immunodeficiency virus            Medical Established Patient with   
Doreen Deborah CNP                        2021  
   
                                        Nicotine dependence Medical Established   
Patient with   
Doreen Deborah CNP                        2021  
   
                                        Tachycardia         Medical Established   
Patient with   
Doreen Deborah [facility]                        2021  
   
                                                    Z68.43 - Body mass index [BM  
I]   
50.0-59.9, adult                        Medical Established Patient with   
Doreen Deborah CNP                        2021  
   
                                                    Bipolar I disorder, most rec  
ent   
episode, manic                           Established Patient with Leonie   
Short [facility]                             2021  
   
                                                    Borderline personality disor  
danny per   
patient reported history                 Established Patient with Leonie   
Short [facility]                             2021  
   
                                        Nicotine dependence  Established Patie  
nt with Leonie   
Short [facility]                             2021  
   
                                        Post-traumatic stress disorder BH Establ  
ished Patient with Leonie   
Short [facility]                             2021  
   
                                        Body mass index     Medical Established   
Patient with   
Doreen Deborah CNP                        2021  
   
                                        Morbid obesity      Medical Established   
Patient with   
Doreen Deborah CNP                        2021  
   
                                        Nicotine dependence uncomplicated Medica  
l Established Patient with   
Doreen Deborah CNP                        2021  
   
                                                    Z68.42 - Body mass index [BM  
I]   
45.0-49.9, adult                        Medical Established Patient with   
Doreen Deborah CNP                        2021  
   
                                Episodic mood disorders On Call with Pamela edwards CNP 2021  
   
                                                    Mood disorders, NOS per tabatha  
ent   
reported history                        BH Established Patient with Leonie   
Short [facility]                             2021  
   
                                        Post-traumatic stress disorder BH Establ  
ished Patient with Leonie   
Short [facility]                             2021  
   
                                        Morbid obesity      Medical Established   
Patient with   
Doreen Deborah CNP                        2021  
   
                                        Otitis externa      Medical Established   
Patient with   
Doreen Deborah CNP                        2021  
   
                                                    Z68.42 - Body mass index [BM  
I]   
45.0-49.9, adult                        Medical Established Patient with   
Doreen Deborah CNP                        2021  
   
                                        Post-traumatic stress disorder BH Establ  
ished Patient with Leonie   
Short [facility]WS                             06/10/2021  
   
                                        Morbid obesity      Medical Established   
Patient with   
Doreen Deborah CNP                        06/10/2021  
   
                                                    Z68.42 - Body mass index [BM  
I]   
45.0-49.9, adult                        Medical Established Patient with   
Doreen Deborah CNP                        06/10/2021  
   
                                        Post-traumatic stress disorder BH Establ  
ished Patient with Leonie   
Short [facility]                             2021  
   
                                                    Assessment of visit for: bhargavi myles for   
human immunodeficiency virus            Medical New Patient with Doreen   
Deborah CNP                              2021  
   
                                                    Diabetes Risk Test Score was  
 one score   
2021                               Medical New Patient with Doreen   
Deborah CNP                              2021  
   
                                        Hypertension        Medical New Patient   
with Doreen   
Deborah CNP                              2021  
   
                                        Morbid obesity      Medical New Patient   
with Doreen   
Deborah CNP                              2021  
   
                                        Post-traumatic stress disorder Medical N  
ew Patient with Doreen   
Deborah CNP                              2021  
   
                                                    Z68.42 - Body mass index [BM  
I]   
45.0-49.9, adult                        Medical New Patient with Doreen   
Deborah CNP                              2021  
  
[facility] [place]  
Work Phone: [phone]- Evaluation note  
  
Includes: Assessments for all patient encounters  
  
  
  
                                Findings        Encounter       Date  
   
                                                    Bipolar affective disorder,   
current   
episode depressed, severe with   
psychosis                                Telebehavioral Health with Haylienicanor Aguilar [facility]                        2022  
   
                                                    Assessment of body mass inde  
x [Body   
mass index [BMI] 50.0-59.9, adult]      Open Access - Established with   
Julieth Aliya CNP                        2022  
   
                                                    Bipolar I disorder, most rec  
ent   
episode, manic                          Open Access - Established with   
Julieth Aliya CNP                        2022  
   
                                        Post-traumatic stress disorder  Establ  
[facility]  
   
                                        No cough            Medical Established   
Patient with   
Doreen Deborah CNP                        2022  
   
                                                    Z68.43 - Body mass index [BM  
I]   
50.0-59.9, adult                        Medical Established Patient with   
Doreenpaola Cabrera CNP                        2022  
   
                                                    Borderline personality disor  
danny Pt   
reported hx of sx/dx                     [facility]  
   
                                                    Assessment of body mass inde  
x [Body   
mass index [BMI] 50.0-59.9, adult]      Open Access - Established with   
Julieth Aliya CNP                        2022  
   
                                                    Diabetes Risk Test Score was  
 three   
score 2022                         Open Access - Established with   
Julieth Aliya CNP                        2022  
   
                                                    Bipolar I disorder, most rec  
ent   
episode, manic                           [facility]   
[facility]  
   
                                        Borderline personality disorder  Estab  
lished Patient with   
Avis Felder [facility]                2021  
   
                                        Post-traumatic stress disorder  Establ  
ished Patient with   
Avis Felder [facility]                2021  
   
                                                    Assessment of visit for: bhargavi myles for   
human immunodeficiency virus            Medical Established Patient with   
Doreen Deborah CNP                        2021  
   
                                        Nicotine dependence Medical Established   
Patient with   
Doreen Deborah CNP                        2021  
   
                                        Tachycardia         Medical Established   
Patient with   
Doreen Deborah CNP                        2021  
   
                                                    Z68.43 - Body mass index [BM  
I]   
50.0-59.9, adult                        Medical Established Patient with   
Doreen Deborah CNP                        2021  
   
                                                    Bipolar I disorder, most rec  
ent   
episode, manic                          BH Established Patient with Leonie   
Short [facility]                             2021  
   
                                                    Borderline personality disor  
danny per   
patient reported history                BH Established Patient with Leonie   
Short L[facility]                             2021  
   
                                        Nicotine dependence BH Established Patie  
nt with Leonie   
Short LISWS                             2021  
   
                                        Post-traumatic stress disorder BH Establ  
ished Patient with Leonie   
Short LISWS                             2021  
   
                                        Body mass index     Medical Established   
Patient with   
Doreen Deborah CNP                        2021  
   
                                        Morbid obesity      Medical Established   
Patient with   
Doreen Deborah CNP                        2021  
   
                                        Nicotine dependence uncomplicated Medica  
l Established Patient with   
Doreen Deborah CNP                        2021  
   
                                                    Z68.42 - Body mass index [BM  
I]   
45.0-49.9, adult                        Medical Established Patient with   
Doreen Deborah CNP                        2021  
   
                                Episodic mood disorders On Call with Pamela edwards CNP 2021  
   
                                                    Mood disorders, NOS per tabatha  
ent   
reported history                        BH Established Patient with Leonie   
Short [facility]                             2021  
   
                                        Post-traumatic stress disorder BH Establ  
ished Patient with Leonie   
Short [facility]                             2021  
   
                                        Morbid obesity      Medical Established   
Patient with   
Doreen Deborah CNP                        2021  
   
                                        Otitis externa      Medical Established   
Patient with   
Doreen Deborah CNP                        2021  
   
                                                    Z68.42 - Body mass index [BM  
I]   
45.0-49.9, adult                        Medical Established Patient with   
Doreen Deborah CNP                        2021  
   
                                        Post-traumatic stress disorder BH Establ  
ished Patient with Leonie   
Short LISWS                             06/10/2021  
   
                                        Morbid obesity      Medical Established   
Patient with   
Doreen Deborah CNP                        06/10/2021  
   
                                                    Z68.42 - Body mass index [BM  
I]   
45.0-49.9, adult                        Medical Established Patient with   
Doreen Deborah CNP                        06/10/2021  
   
                                        Post-traumatic stress disorder BH Establ  
ished Patient with Leonie   
Short LISWS                             2021  
   
                                                    Assessment of visit for: bhargavi myles for   
human immunodeficiency virus            Medical New Patient with Doreen   
Deborah CNP                              2021  
   
                                                    Diabetes Risk Test Score was  
 one score   
2021                               Medical New Patient with Doreen   
Deborah CNP                              2021  
   
                                        Hypertension        Medical New Patient   
with Doreen   
Deborah CNP                              2021  
   
                                        Morbid obesity      Medical New Patient   
with Doreen   
Deborah CNP                              2021  
   
                                        Post-traumatic stress disorder Medical N  
ew Patient with Doreen Cabrera CNP                              2021  
   
                                                    Z68.42 - Body mass index [BM  
I]   
45.0-49.9, adult                        Medical New Patient with Doreen Cabrera CNP                              2021  
  
[facility]  
Work Phone: [phone]- Evaluation note  
  
Includes: Assessments for all patient encounters  
  
  
  
                                Findings        Encounter       Date  
   
                                                    Bipolar affective disorder,   
current   
episode manic                            Established Patient with Haylienicanor Aguilar LPCC-S                        2022  
   
                                                    Bipolar affective disorder,   
current   
episode depressed, severe with   
psychosis                                Tele[facility]al [facility] with Haylienicanor Aguilar LPCC-S                        2022  
   
                                                    Assessment of body mass inde  
x [Body   
mass index [BMI] 50.0-59.9, adult]      Open Access - Established with   
Julieth Aliya CNP                        2022  
   
                                                    Bipolar I disorder, most rec  
ent   
episode, manic                          Open Access - Established with   
Julieth Aliya CNP                        2022  
   
                                        Post-traumatic stress disorder  Establ  
ished Patient with Haylienicanor Aguilar LPCC-S                        2022  
   
                                        No cough            Medical Established   
Patient with   
Doreenpaola Cabrera [facility]                        2022  
   
                                                    Z68.43 - Body mass index [BM  
I]   
50.0-59.9, adult                        Medical Established Patient with   
Doreenpaola Cabrera CNP                        2022  
   
                                                    Borderline personality disor  
danny Pt   
reported hx of sx/dx                     Established Patient with Haylienicanor Aguilar LPCC-S                        2022  
   
                                                    Assessment of body mass inde  
x [Body   
mass index [BMI] 50.0-59.9, adult]      Open Access - Established with   
Julieth Aliya CNP                        2022  
   
                                                    Diabetes Risk Test Score was  
 three   
score 2022                         Open Access - Established with   
Julieth Aliya CNP                        2022  
   
                                                    Bipolar I disorder, most rec  
ent   
episode, manic                           Established Patient with   
Avissharmila Mcgees LPCC-S                2021  
   
                                        Borderline personality disorder  Estab  
lished Patient with   
Avis Felder LPCC-S                2021  
   
                                        Post-traumatic stress disorder  Establ  
ished Patient with   
Avis Felder LPCC-S                2021  
   
                                                    Assessment of visit for: bhargavi myles for   
human immunodeficiency virus            Medical Established Patient with   
Doreenpaola Mccormacken CNP                        2021  
   
                                        Nicotine dependence Medical Established   
Patient with   
Doreen Deborah CNP                        2021  
   
                                        Tachycardia         Medical Established   
Patient with   
Doreen Deborah CNP                        2021  
   
                                                    Z68.43 - Body mass index [BM  
I]   
50.0-59.9, adult                        Medical Established Patient with   
Doreen Deborah CNP                        2021  
   
                                                    Bipolar I disorder, most rec  
ent   
episode, manic                           Established Patient with Leonie   
Short LISWS                             2021  
   
                                                    Borderline personality disor  
danny per   
patient reported history                 Established Patient with Leonie   
Short LISWS                             2021  
   
                                        Nicotine dependence BH Established Patie  
nt with Leonie   
Short LISWS                             2021  
   
                                        Post-traumatic stress disorder  Establ  
ished Patient with Leonie   
Short LISWS                             2021  
   
                                        Body mass index     Medical Established   
Patient with   
Doreen Deborah CNP                        2021  
   
                                        Morbid obesity      Medical Established   
Patient with   
Doreen Deborah CNP                        2021  
   
                                        Nicotine dependence uncomplicated Medica  
l Established Patient with   
Doreen Deborah CNP                        2021  
   
                                                    Z68.42 - Body mass index [BM  
I]   
45.0-49.9, adult                        Medical Established Patient with   
Doreen Deborah CNP                        2021  
   
                                Episodic mood disorders On Call with Pamela edwards CNP 2021  
   
                                                    Mood disorders, NOS per tabatha  
ent   
reported history                         Established Patient with Leonie   
Short LISWS                             2021  
   
                                        Post-traumatic stress disorder  Establ  
ished Patient with Leonie   
Short LISWS                             2021  
   
                                        Morbid obesity      Medical Established   
Patient with   
Doreen Deborah CNP                        2021  
   
                                        Otitis externa      Medical Established   
Patient with   
Doreen Deborah CNP                        2021  
   
                                                    Z68.42 - Body mass index [BM  
I]   
45.0-49.9, adult                        Medical Established Patient with   
Doreen Deborah CNP                        2021  
   
                                        Post-traumatic stress disorder BH Establ  
ished Patient with Leonie   
Short LISWS                             06/10/2021  
   
                                        Morbid obesity      Medical Established   
Patient with   
Doreen Deborah CNP                        06/10/2021  
   
                                                    Z68.42 - Body mass index [BM  
I]   
45.0-49.9, adult                        Medical Established Patient with   
Doreen Deborah CNP                        06/10/2021  
   
                                        Post-traumatic stress disorder  Establ  
ished Patient with Leonie   
Short LISWS                             2021  
   
                                                    Assessment of visit for: scr  
eening for   
human immunodeficiency virus            Medical New Patient with Doreenpaola Cabrera [facility]                              2021  
   
                                                    Diabetes Risk Test Score was  
 one score   
2021                               Medical New Patient with Doreen   
Deborah [facility]                              2021  
   
                                        Hypertension        Medical New Patient   
with Doreen   
Deborah [facility]                              2021  
   
                                        Morbid obesity      Medical New Patient   
with Doreenpaola Cabrera CNP                              2021  
   
                                        Post-traumatic stress disorder Medical N  
ew Patient with Doreen   
Deborah [facility]                              2021  
   
                                                    Z68.42 - Body mass index [BM  
I]   
45.0-49.9, adult                        Medical New Patient with Doreenpaola Cabrera [facility]                              2021  
  
[facility] [place]  
Work Phone: [phone]- Evaluation note  
  
Includes: Assessments for all patient encounters  
  
  
  
                                Findings        Encounter       Date  
   
                                        Post-traumatic stress disorder  Establ  
ished Patient with Haylienicanor Martinerson LPCC-S                        2022  
   
                                        No cough            Medical Established   
Patient with   
Doreen Deborah [facility]                        2022  
   
                                                    Z68.43 - Body mass index [BM  
I]   
50.0-59.9, adult                        Medical Established Patient with   
Doreenpaola Mccormacken CNP                        2022  
   
                                                    Borderline personality disor  
danny Pt   
reported hx of sx/dx                     Established Patient with Haylienicanor Aguilar LPCC-S                        2022  
   
                                                    Assessment of body mass inde  
x [Body mass   
index [BMI] 50.0-59.9, adult]           Open Access - Established with   
Julieth Pisano CNP                        2022  
   
                                                    Diabetes Risk Test Score was  
 three score   
2022                               Open Access - Established with   
Julieth Aliya CNP                        2022  
   
                                                    Bipolar I disorder, most rec  
ent episode,   
manic                                    Established Patient with   
Avis Felder LPCC-S                2021  
   
                                        Borderline personality disorder  Estab  
lished Patient with   
Avis Felder LPCC-S                2021  
   
                                        Post-traumatic stress disorder  Establ  
ished Patient with   
Avis Felder LPCC-S                2021  
   
                                                    Assessment of visit for: scr  
eening for   
human immunodeficiency virus            Medical Established Patient with   
Doreen Deborah CNP                        2021  
   
                                        Nicotine dependence Medical Established   
Patient with   
Doreen Deborah CNP                        2021  
   
                                        Tachycardia         Medical Established   
Patient with   
Doreen Deborah CNP                        2021  
   
                                                    Z68.43 - Body mass index [BM  
I]   
50.0-59.9, adult                        Medical Established Patient with   
Doreen Deborah CNP                        2021  
   
                                                    Bipolar I disorder, most rec  
ent episode,   
manic                                    Established Patient with   
Leonie Short LISWS                     2021  
   
                                                    Borderline personality disor  
danny per   
patient reported history                BH Established Patient with   
Leonie Short LISWS                     2021  
   
                                        Nicotine dependence BH Established Patie  
nt with   
Leonie Short LISWS                     2021  
   
                                        Post-traumatic stress disorder  Establ  
ished Patient with   
Leonie Short LISWS                     2021  
   
                                        Body mass index     Medical Established   
Patient with   
Doreen Deborah CNP                        2021  
   
                                        Morbid obesity      Medical Established   
Patient with   
Doreen Deborah CNP                        2021  
   
                                        Nicotine dependence uncomplicated Medica  
l Established Patient with   
Doreen Deborah CNP                        2021  
   
                                                    Z68.42 - Body mass index [BM  
I]   
45.0-49.9, adult                        Medical Established Patient with   
Doreen Cabrera [facility]                        2021  
   
                                Episodic mood disorders On Call with Pamela edwards CNP 2021  
   
                                                    Mood disorders, NOS per tabatha  
ent reported   
history                                  Established Patient with   
Leonie Short LISWS                     2021  
   
                                        Post-traumatic stress disorder  Establ  
ished Patient with   
Leonie Short LISWS                     2021  
   
                                        Morbid obesity      Medical Established   
Patient with   
Doreen Deborah CNP                        2021  
   
                                        Otitis externa      Medical Established   
Patient with   
Doreen Cabrera CNP                        2021  
   
                                                    Z68.42 - Body mass index [BM  
I]   
45.0-49.9, adult                        Medical Established Patient with   
Doreen Deborah CNP                        2021  
   
                                        Post-traumatic stress disorder  Establ  
ished Patient with   
Leonie Short LISWS                     06/10/2021  
   
                                        Morbid obesity      Medical Established   
Patient with   
Doreen Deborah CNP                        06/10/2021  
   
                                                    Z68.42 - Body mass index [BM  
I]   
45.0-49.9, adult                        Medical Established Patient with   
Doreen Deborah CNP                        06/10/2021  
   
                                        Post-traumatic stress disorder BH Establ  
ished Patient with   
Leonie Short LISWS                     2021  
   
                                                    Assessment of visit for: bhargavi myles for   
human immunodeficiency virus            Medical New Patient with Doreen Cabrera CNP                              2021  
   
                                                    Diabetes Risk Test Score was  
 one score   
2021                               Medical New Patient with Doreen Cabrera CNP                              2021  
   
                                        Hypertension        Medical New Patient   
with Doreen Cabrera LARISSA                              2021  
   
                                        Morbid obesity      Medical New Patient   
with Doreen Cabrera [facility]                              2021  
   
                                        Post-traumatic stress disorder Medical N  
ew Patient with Doreen Cabrera CNP                              2021  
   
                                                    Z68.42 - Body mass index [BM  
I]   
45.0-49.9, adult                        Medical New Patient with Doreen Cabrera LARISSA                              2021  
  
[facility] [place]  
Work Phone: [phone]- History general Narrative - Reported  
  
Includes: Medical History in patient's chart  
  
  
  
                                        Description         Last Updated  
   
                                        Has sex without a condom 2022  
   
                                        Not planning to have a baby in the next   
12 months 2022  
   
                                        Partners sexually transmitted infection   
status known 2022  
   
                                        Previously pregnant 0 time(s) 2022  
   
                                        No previous hospitalizations 2022  
   
                                        Chronic illness     2021  
   
                                        Exposure to COVID-19 2021  
   
                                        History of gynecologic disorder 20  
21  
   
                                        History of Polycystic Ovarian Syndrome (  
PCOS) 2021  
   
                                        History of anxiety disorder NOS 20  
21  
   
                                        History of migraine headache 2021  
   
                                        History of psychiatric disorders biopola  
r disorder 2021  
  
[facility] [place]  
Work Phone: [phone]- Evaluation note  
  
Includes: Assessments for all patient encounters  
  
  
  
                                Findings        Encounter       Date  
   
                                                    Borderline personality disor  
danny Pt   
reported hx of sx/dx                     [facility]  
   
                                                    Assessment of body mass inde  
x [Body mass   
index [BMI] 50.0-59.9, adult]           Open Access - Established with   
Julieth Pisano CNP                        2022  
   
                                                    Diabetes Risk Test Score was  
 three score   
2022                               Open Access - Established with   
Julieth Pisano CNP                        2022  
   
                                                    Bipolar I disorder, most rec  
ent episode,   
manic                                    Established Patient with   
Avissharmila Mcgees LPCC-S                2021  
   
                                        Borderline personality disorder  Estab  
lished Patient with   
Avis Felder [facility]-S                2021  
   
                                        Post-traumatic stress disorder  Establ  
ished Patient with   
Avis Felder LPCC-S                2021  
   
                                                    Assessment of visit for: bhargavi myles for   
human immunodeficiency virus            Medical Established Patient with   
Doreen Deborah CNP                        2021  
   
                                        Nicotine dependence Medical Established   
Patient with   
Doreen Deborah CNP                        2021  
   
                                        Tachycardia         Medical Established   
Patient with   
Doreen Deborah CNP                        2021  
   
                                                    Z68.43 - Body mass index [BM  
I]   
50.0-59.9, adult                        Medical Established Patient with   
Doreen Deborah CNP                        2021  
   
                                                    Bipolar I disorder, most rec  
ent episode,   
manic                                    Established Patient with   
Leonie Short LISWS                     2021  
   
                                                    Borderline personality disor  
danny per   
patient reported history                 Established Patient with   
Leonie Short LISWS                     2021  
   
                                        Nicotine dependence  Established Patie  
nt with   
Leonie Short LISWS                     2021  
   
                                        Post-traumatic stress disorder  Establ  
ished Patient with   
Leonie Short LISWS                     2021  
   
                                        Body mass index     Medical Established   
Patient with   
Doreen Deborah CNP                        2021  
   
                                        Morbid obesity      Medical Established   
Patient with   
Doreen Deborah CNP                        2021  
   
                                        Nicotine dependence uncomplicated Medica  
l Established Patient with   
Doreen Deborah CNP                        2021  
   
                                                    Z68.42 - Body mass index [BM  
I]   
45.0-49.9, adult                        Medical Established Patient with   
Doreen Deborah CNP                        2021  
   
                                Episodic mood disorders On Call with Pamela edwards CNP 2021  
   
                                                    Mood disorders, NOS per tabatha  
ent reported   
history                                  Established Patient with   
Leonie Short LISWS                     2021  
   
                                        Post-traumatic stress disorder  Establ  
ished Patient with   
Leonie Short LISWS                     2021  
   
                                        Morbid obesity      Medical Established   
Patient with   
Doreen Deboarh CNP                        2021  
   
                                        Otitis externa      Medical Established   
Patient with   
Doreen Deborah CNP                        2021  
   
                                                    Z68.42 - Body mass index [BM  
I]   
45.0-49.9, adult                        Medical Established Patient with   
Doreen Deborah CNP                        2021  
   
                                        Post-traumatic stress disorder BH Establ  
ished Patient with   
Leonie Short LISWS                     06/10/2021  
   
                                        Morbid obesity      Medical Established   
Patient with   
Doreen Deborah CNP                        06/10/2021  
   
                                                    Z68.42 - Body mass index [BM  
I]   
45.0-49.9, adult                        Medical Established Patient with   
Doreen Deborah LARISSA                        06/10/2021  
   
                                        Post-traumatic stress disorder BH Establ  
ished Patient with   
Leonie Short LISWS                     2021  
   
                                                    Assessment of visit for: bhargavi myles for   
human immunodeficiency virus            Medical New Patient with Doreen Cabrera LARISSA                              2021  
   
                                                    Diabetes Risk Test Score was  
 one score   
2021                               Medical New Patient with Doreen Cabrera LARISSA                              2021  
   
                                        Hypertension        Medical New Patient   
with Doreen Cabrera LARISSA                              2021  
   
                                        Morbid obesity      Medical New Patient   
with Doreen Cabrera LARISSA                              2021  
   
                                        Post-traumatic stress disorder Medical N  
ew Patient with Doreen Cabrera LARISSA                              2021  
   
                                                    Z68.42 - Body mass index [BM  
I]   
45.0-49.9, adult                        Medical New Patient with Doreen Cabrera LARISSA                              2021  
  
[facility]  
Work Phone: [phone]- Reason for referral (narrative)*   
  
                          Date         Encounter Description Provider     Reason  
 for Referral  
   
                          22      Established Patient Haylie Aguilar LP  
CC-S Referral To Mental Health  
 Team  
   
                          21     Medical New Patient Doreen Cabrear CNP Refe  
rral To Mental Health Team  
  
  
[facility]  
Work Phone: [phone]- History of Present illness Narrative* Rosie Goldberg - 10/04/2021 1:30 PM EDT  
  
Formatting of this note might be different from the original.  
Explained Holter monitor and diary.  
  
  
  
Electronically signed by Rosie Goldberg at 10/04/2021 2:23 PM EDT  
  
  
[facility]  
Work Phone: [phone]- Evaluation note  
  
Includes: Assessments for all patient encounters  
  
  
  
                                Findings        Encounter       Date  
   
                                                    Bipolar I disorder, most rec  
ent episode,   
manic                                    Established Patient with   
Avis Felder LPCC-S                2021  
   
                                        Borderline personality disorder  Estab  
lished Patient with   
Avis Felder LPCC-S                2021  
   
                                                    Assessment of visit for: bhargavi myles for   
human immunodeficiency virus            Medical Established Patient with   
Doreen Mccormackobed DIAS                        2021  
   
                                        Nicotine dependence Medical Established   
Patient with   
Doreenpaola Cabrera CNP                        2021  
   
                                        Tachycardia         Medical Established   
Patient with   
Doreen Deborah DIAS                        2021  
   
                                                    Z68.43 - Body mass index [BM  
I]   
50.0-59.9, adult                        Medical Established Patient with   
Doreen Cabrera CNP                        2021  
   
                                                    Bipolar I disorder, most rec  
ent episode,   
manic                                    Established Patient with   
Leonie Short LISWS                     2021  
   
                                                    Borderline personality disor  
danny per   
patient reported history                BH Established Patient with   
Leonie Short LISWS                     2021  
   
                                        Nicotine dependence BH Established Patie  
nt with   
Leonie Short LISWS                     2021  
   
                                        Post-traumatic stress disorder  Establ  
ished Patient with   
Leonie Short LISWS                     2021  
   
                                        Body mass index     Medical Established   
Patient with   
Doreen Mccormacken CNP                        2021  
   
                                        Morbid obesity      Medical Established   
Patient with   
Doreen Deborah CNP                        2021  
   
                                        Nicotine dependence uncomplicated Medica  
l Established Patient with   
Doreen Deborah [facility]                        2021  
   
                                                    Z68.42 - Body mass index [BM  
I]   
45.0-49.9, adult                        Medical Established Patient with   
Doreen Cabrera [facility]                        2021  
   
                                Episodic mood disorders On Call with Pamela edwards CNP 2021  
   
                                                    Mood disorders, NOS per tabatha  
ent reported   
history                                  Established Patient with   
Leonie Short LISWS                     2021  
   
                                        Post-traumatic stress disorder  Establ  
ished Patient with   
Leonie Short LISWS                     2021  
   
                                        Morbid obesity      Medical Established   
Patient with   
Doreen Deborah [facility]                        2021  
   
                                        Otitis externa      Medical Established   
Patient with   
Doreenpaola Cabrera [facility]                        2021  
   
                                                    Z68.42 - Body mass index [BM  
I]   
45.0-49.9, adult                        Medical Established Patient with   
Doreen Deborah CNP                        2021  
   
                                        Post-traumatic stress disorder  Establ  
ished Patient with   
Leonie Short LISWS                     06/10/2021  
   
                                        Morbid obesity      Medical Established   
Patient with   
Doreen Deborah CNP                        06/10/2021  
   
                                                    Z68.42 - Body mass index [BM  
I]   
45.0-49.9, adult                        Medical Established Patient with   
Doreen Deborah CNP                        06/10/2021  
   
                                        Post-traumatic stress disorder BH Establ  
ished Patient with   
Leonie Short LISWS                     2021  
   
                                                    Assessment of visit for: hbargavi myles for   
human immunodeficiency virus            Medical New Patient with Doreen Cabrera CNP                              2021  
   
                                                    Diabetes Risk Test Score was  
 one score   
2021                               Medical New Patient with Doreen   
Deborah CNP                              2021  
   
                                        Hypertension        Medical New Patient   
with Doreen   
Deborah CNP                              2021  
   
                                        Morbid obesity      Medical New Patient   
with Doreen   
Deborah CNP                              2021  
   
                                        Post-traumatic stress disorder Medical N  
ew Patient with Doreen   
Deborah CNP                              2021  
   
                                                    Z68.42 - Body mass index [BM  
I]   
45.0-49.9, adult                        Medical New Patient with Doreen   
Deborah CNP                              2021  
  
[facility]  
Work Phone: [phone]- Evaluation note  
  
Includes: Assessments for all patient encounters  
  
  
  
                                Findings        Encounter       Date  
   
                                                    Bipolar I disorder, most rec  
ent episode,   
manic                                   BH Established Patient with   
Avis Felder LPCC-S                2021  
   
                                        Borderline personality disorder BH Estab  
lished Patient with   
Avis Felder LPCC-S                2021  
   
                                        Post-traumatic stress disorder BH Establ  
ished Patient with   
Avis Felder LPCC-S                2021  
   
                                                    Assessment of visit for: bhargavi myles for   
human immunodeficiency virus            Medical Established Patient with   
Doreen Deborah CNP                        2021  
   
                                        Nicotine dependence Medical Established   
Patient with   
Doreen Deborah CNP                        2021  
   
                                        Tachycardia         Medical Established   
Patient with   
Doreen Deborah CNP                        2021  
   
                                                    Z68.43 - Body mass index [BM  
I]   
50.0-59.9, adult                        Medical Established Patient with   
Doreen Deborah CNP                        2021  
   
                                                    Bipolar I disorder, most rec  
ent episode,   
manic                                    Established Patient with   
Leonie Short LISWS                     2021  
   
                                                    Borderline personality disor  
danny per   
patient reported history                BH Established Patient with   
Leonie Short LISWS                     2021  
   
                                        Nicotine dependence  Established Patie  
nt with   
Leonie Short LISWS                     2021  
   
                                        Post-traumatic stress disorder BH Establ  
ished Patient with   
Leonie Short LISWS                     2021  
   
                                        Body mass index     Medical Established   
Patient with   
Doreen Deborah CNP                        2021  
   
                                        Morbid obesity      Medical Established   
Patient with   
Doreen Deborah CNP                        2021  
   
                                        Nicotine dependence uncomplicated Medica  
l Established Patient with   
Doreen Deborah CNP                        2021  
   
                                                    Z68.42 - Body mass index [BM  
I]   
45.0-49.9, adult                        Medical Established Patient with   
Doreen Deborah CNP                        2021  
   
                                Episodic mood disorders On Call with Pamela edwards CNP 2021  
   
                                                    Mood disorders, NOS per tabatha  
ent reported   
history                                  Established Patient with   
Leonie Short LISWS                     2021  
   
                                        Post-traumatic stress disorder  Establ  
ished Patient with   
Leonie Short LISWS                     2021  
   
                                        Morbid obesity      Medical Established   
Patient with   
Doreen Deborah CNP                        2021  
   
                                        Otitis externa      Medical Established   
Patient with   
Doreen Deborah CNP                        2021  
   
                                                    Z68.42 - Body mass index [BM  
I]   
45.0-49.9, adult                        Medical Established Patient with   
Doreen Deborah CNP                        2021  
   
                                        Post-traumatic stress disorder BH Establ  
ished Patient with   
Leonie Short LISWS                     06/10/2021  
   
                                        Morbid obesity      Medical Established   
Patient with   
Doreen Deborah CNP                        06/10/2021  
   
                                                    Z68.42 - Body mass index [BM  
I]   
45.0-49.9, adult                        Medical Established Patient with   
Doreen Deborah CNP                        06/10/2021  
   
                                        Post-traumatic stress disorder  Establ  
ished Patient with   
Leonie Short LISWS                     2021  
   
                                                    Assessment of visit for: bhargavi myles for   
human immunodeficiency virus            Medical New Patient with Doreen   
Deborah CNP                              2021  
   
                                                    Diabetes Risk Test Score was  
 one score   
2021                               Medical New Patient with Doreen   
Deborah CNP                              2021  
   
                                        Hypertension        Medical New Patient   
with Doreen   
Deborah CNP                              2021  
   
                                        Morbid obesity      Medical New Patient   
with Doreen   
Deborah [facility]                              2021  
   
                                        Post-traumatic stress disorder Medical N  
ew Patient with Doreen   
Deborah CNP                              2021  
   
                                                    Z68.42 - Body mass index [BM  
I]   
45.0-49.9, adult                        Medical New Patient with Doreen   
Deborah [facility]                              2021  
  
[facility]  
Work Phone: [phone]- Evaluation note  
  
Includes: Assessments for all patient encounters  
  
  
  
                                Findings        Encounter       Date  
   
                                                    Bipolar I disorder, most rec  
ent episode,   
manic                                    Established Patient with   
Leonie Short LISWS                     2021  
   
                                                    Borderline personality disor  
danny per   
patient reported history                 Established Patient with   
Leonie Short LISWS                     2021  
   
                                        Nicotine dependence  Established Patie  
nt with   
Leonie Short LISWS                     2021  
   
                                        Post-traumatic stress disorder BH Establ  
ished Patient with   
Leonie Short LISWS                     2021  
   
                                        Body mass index     Medical Established   
Patient with   
Doreen Deborah CNP                        2021  
   
                                        Morbid obesity      Medical Established   
Patient with   
Doreen Deborah CNP                        2021  
   
                                        Nicotine dependence uncomplicated Medica  
l Established Patient with   
Doreen Deborah CNP                        2021  
   
                                                    Z68.42 - Body mass index [BM  
I]   
45.0-49.9, adult                        Medical Established Patient with   
Doreen Deborah CNP                        2021  
   
                                Episodic mood disorders On Call with Pamela edwards CNP 2021  
   
                                                    Mood disorders, NOS per tabatha  
ent reported   
history                                 BH Established Patient with   
Leonie Short LISWS                     2021  
   
                                        Post-traumatic stress disorder BH Establ  
ished Patient with   
Leonie Short LISWS                     2021  
   
                                        Morbid obesity      Medical Established   
Patient with   
Doreen Deborah CNP                        2021  
   
                                        Otitis externa      Medical Established   
Patient with   
Doreen Deborah CNP                        2021  
   
                                                    Z68.42 - Body mass index [BM  
I]   
45.0-49.9, adult                        Medical Established Patient with   
Doreen Deborah CNP                        2021  
   
                                        Post-traumatic stress disorder BH Establ  
ished Patient with   
Leonie Short LISWS                     06/10/2021  
   
                                        Morbid obesity      Medical Established   
Patient with   
Doreen Deborah CNP                        06/10/2021  
   
                                                    Z68.42 - Body mass index [BM  
I]   
45.0-49.9, adult                        Medical Established Patient with   
Doreen Deborah CNP                        06/10/2021  
   
                                        Post-traumatic stress disorder BH Establ  
ished Patient with   
Leonie Short LISWS                     2021  
   
                                                    Assessment of visit for: bhargavi myles for   
human immunodeficiency virus            Medical New Patient with Doreen   
Deborah CNP                              2021  
   
                                                    Diabetes Risk Test Score was  
 one score   
2021                               Medical New Patient with Doreen   
Deborah CNP                              2021  
   
                                        Hypertension        Medical New Patient   
with Doreen   
Deborah CNP                              2021  
   
                                        Morbid obesity      Medical New Patient   
with Doreen   
Deborah CNP                              2021  
   
                                        Post-traumatic stress disorder Medical N  
ew Patient with Doreen   
Deborah CNP                              2021  
   
                                                    Z68.42 - Body mass index [BM  
I]   
45.0-49.9, adult                        Medical New Patient with Doreen   
Deborah CNP                              2021  
  
[facility]  
Work Phone: [phone]- History general Narrative - Reported  
  
Includes: Medical History in patient's chart  
  
  
  
                                        Description         Last Updated  
   
                                        Chronic illness     2021  
   
                                        Exposure to COVID-19 2021  
   
                                        Previous hospitalizations 2021  
   
                                        History of gynecologic disorder 20  
21  
   
                                        History of Polycystic Ovarian Syndrome (  
PCOS) 2021  
   
                                        History of anxiety disorder NOS 20  
21  
   
                                        History of migraine headache 2021  
   
                                        History of psychiatric disorders biopola  
r disorder 2021  
  
[facility]  
Work Phone: [phone]- Evaluation note  
  
Includes: Assessments for all patient encounters  
  
  
  
                                Findings        Encounter       Date  
   
                                Episodic mood disorders On Call with Pamela edwards CNP 2021  
   
                                                    Mood disorders, NOS per tabatha  
ent reported   
history                                  Established Patient with   
Leoine Short LISWS                     2021  
   
                                        Post-traumatic stress disorder  Establ  
ished Patient with   
Leonie Short LISWS                     2021  
   
                                        Morbid obesity      Medical Established   
Patient with   
Doreen Deborah CNP                        2021  
   
                                        Otitis externa      Medical Established   
Patient with   
Doreen Deborah CNP                        2021  
   
                                                    Z68.42 - Body mass index [BM  
I]   
45.0-49.9, adult                        Medical Established Patient with   
Doreen Deborah CNP                        2021  
   
                                        Post-traumatic stress disorder  Establ  
ished Patient with   
Leonie Short LISWS                     06/10/2021  
   
                                        Morbid obesity      Medical Established   
Patient with   
Doreen Deborah CNP                        06/10/2021  
   
                                                    Z68.42 - Body mass index [BM  
I]   
45.0-49.9, adult                        Medical Established Patient with   
Doreen Deborah CNP                        06/10/2021  
   
                                        Post-traumatic stress disorder  Establ  
ished Patient with   
Leonie Short LISWS                     2021  
   
                                                    Assessment of visit for: bhargavi myles for   
human immunodeficiency virus            Medical New Patient with Doreen   
Deborah CNP                              2021  
   
                                                    Diabetes Risk Test Score was  
 one score   
2021                               Medical New Patient with Doreen   
Deborah CNP                              2021  
   
                                        Hypertension        Medical New Patient   
with Doreen   
Deborah CNP                              2021  
   
                                        Morbid obesity      Medical New Patient   
with Doreen   
Deborah CNP                              2021  
   
                                        Post-traumatic stress disorder Medical N  
ew Patient with Doreenpaola Cabrera CNP                              2021  
   
                                                    Z68.42 - Body mass index [BM  
I]   
45.0-49.9, adult                        Medical New Patient with Doreenpaola Cabrera CNP                              2021  
  
[facility]  
Work Phone: [phone]- Evaluation note  
  
Includes: Assessments for all patient encounters  
  
  
  
                                Findings        Encounter       Date  
   
                                                    Mood disorders, NOS per tabatha  
ent reported   
history                                 BH Established Patient with   
Leonie Short LISWS                     2021  
   
                                        Post-traumatic stress disorder BH Establ  
ished Patient with   
Leonie Short LISWS                     2021  
   
                                        Morbid obesity      Medical Established   
Patient with   
Doreen Deborah CNP                        2021  
   
                                        Otitis externa      Medical Established   
Patient with   
Doreen Deborah CNP                        2021  
   
                                                    Z68.42 - Body mass index [BM  
I]   
45.0-49.9, adult                        Medical Established Patient with   
Doreen Deborah CNP                        2021  
   
                                        Post-traumatic stress disorder BH Establ  
ished Patient with   
Leonie Short LISWS                     06/10/2021  
   
                                        Morbid obesity      Medical Established   
Patient with   
Doreen Deborah CNP                        06/10/2021  
   
                                                    Z68.42 - Body mass index [BM  
I]   
45.0-49.9, adult                        Medical Established Patient with   
Doreen Deborah CNP                        06/10/2021  
   
                                        Post-traumatic stress disorder BH Establ  
ished Patient with   
Leonie Short LISWS                     2021  
   
                                                    Assessment of visit for: bhargavi myles for   
human immunodeficiency virus            Medical New Patient with Doreenpaola Cabrera [facility]                              2021  
   
                                                    Diabetes Risk Test Score was  
 one score   
2021                               Medical New Patient with Doreen   
Deborah CNP                              2021  
   
                                        Hypertension        Medical New Patient   
with Doreen   
Deborah CNP                              2021  
   
                                        Morbid obesity      Medical New Patient   
with Doreen   
Deborah [facility]                              2021  
   
                                        Post-traumatic stress disorder Medical N  
ew Patient with Doreenpaola Cabrera [facility]                              2021  
   
                                                    Z68.42 - Body mass index [BM  
I]   
45.0-49.9, adult                        Medical New Patient with Doreenpaola Cabrera [facility]                              2021  
  
[facility]  
Work Phone: [phone]- History general Narrative - Reported  
  
Includes: Medical History in patient's chart  
  
  
  
                                        Description         Last Updated  
   
                                        Exposure to COVID-19 2021  
   
                                        Previous hospitalizations 2021  
   
                                        History of gynecologic disorder 20  
21  
   
                                        History of Polycystic Ovarian Syndrome (  
PCOS) 2021  
   
                                        History of anxiety disorder NOS 20  
21  
   
                                        History of migraine headache 2021  
   
                                        History of psychiatric disorders biopola  
r disorder 2021  
  
[facility]  
Work Phone: [phone]- History general Narrative - Reported  
  
Includes: Medical History in patient's chart  
  
  
  
                                        Description         Last Updated  
   
                                        Chronic illness     2021  
   
                                        Exposure to COVID-19 2021  
   
                                        Previous hospitalizations 2021  
   
                                        History of gynecologic disorder 20  
21  
   
                                        History of Polycystic Ovarian Syndrome (  
PCOS) 2021  
   
                                        History of anxiety disorder NOS 20  
21  
   
                                        History of migraine headache 2021  
   
                                        History of psychiatric disorders biopola  
r disorder 2021  
  
[facility]  
Work Phone: [phone]- Evaluation note  
  
Includes: Assessments for all patient encounters  
  
  
  
                                Findings        Encounter       Date  
   
                                        Morbid obesity      Medical Established   
Patient with   
Doreen Deborah CNP                        06/10/2021  
   
                                                    Z68.42 - Body mass index [BM  
I]   
45.0-49.9, adult                        Medical Established Patient with   
Doreen Deborah CNP                        06/10/2021  
   
                                        Post-traumatic stress disorder  Rebecca biggs Patient with   
Leonie Short LISWS                     2021  
   
                                                    Assessment of visit for: bhargavi guevaraning for   
human immunodeficiency virus            Medical New Patient with Doreen   
Deborah [facility]                              2021  
   
                                                    Diabetes Risk Test Score was  
 one score   
2021                               Medical New Patient with Doreen   
Deborah CNP                              2021  
   
                                        Hypertension        Medical New Patient   
with Doreen   
Deborah CNP                              2021  
   
                                        Morbid obesity      Medical New Patient   
with Doreen   
Deborah CNP                              2021  
   
                                        Post-traumatic stress disorder Medical N  
ew Patient with Doreen   
Deborah CNP                              2021  
   
                                                    Z68.42 - Body mass index [BM  
I]   
45.0-49.9, adult                        Medical New Patient with Doreen   
Deborah CNP                              2021  
  
[facility]  
Work Phone: [phone]- Evaluation note  
  
Includes: Assessments for all patient encounters  
  
  
  
                                Findings        Encounter       Date  
   
                                        Post-traumatic stress disorder  Rebecca biggs Patient with   
Leonie Short LISWS                     2021  
   
                                                    Assessment of visit for: bhargavi myles for   
human immunodeficiency virus            Medical New Patient with Doreen Cabrera CNP                              2021  
   
                                                    Diabetes Risk Test Score was  
 one score   
2021                               Medical New Patient with Doreen Cabrera CNP                              2021  
   
                                        Hypertension        Medical New Patient   
with Doreen Cabrera CNP                              2021  
   
                                        Morbid obesity      Medical New Patient   
with Doreen Cabrera CNP                              2021  
   
                                        Post-traumatic stress disorder Medical N  
ew Patient with Doreen Cabrera CNP                              2021  
   
                                                    Z68.42 - Body mass index [BM  
I]   
45.0-49.9, adult                        Medical New Patient with Doreen Cabrera CNP                              2021  
  
[facility]  
Work Phone: [phone]- History general Narrative - Reported  
  
Includes: Medical History in patient's chart  
  
  
  
                                        Description         Last Updated  
   
                                        History of gynecologic disorder 20  
21  
   
                                        History of Polycystic Ovarian Syndrome (  
PCOS) 2021  
   
                                        History of anxiety disorder NOS 20  
21  
   
                                        History of migraine headache 2021  
   
                                        History of psychiatric disorders biopola  
r disorder 2021  
  
[facility]  
Work Phone: [phone]- History general Narrative - Reported  
  
Includes: Medical History in patient's chart  
  
  
  
                                        Description         Last Updated  
   
                                        Exposure to COVID-19 2021  
   
                                        Previous hospitalizations 2021  
   
                                        History of gynecologic disorder 20  
21  
   
                                        History of Polycystic Ovarian Syndrome (  
PCOS) 2021  
   
                                        History of anxiety disorder NOS 20  
21  
   
                                        History of migraine headache 2021  
   
                                        History of psychiatric disorders biopola  
r disorder 2021  
  
[facility]  
Work Phone: [phone] note*   
  
                                                    Diagnosis  
   
                                                      
  
  
Depression with suicidal ideation- Primary  
  
documented in this encounter  
[facility]  
Work Phone: [phone] note  
  
Includes: Assessments for all patient encounters  
  
  
  
                                Findings        Encounter       Date  
   
                                        Morbid obesity      Medical Established   
Patient with   
Doreen Cabrera CNP                        2021  
   
                                        Otitis externa      Medical Established   
Patient with   
Doreen Cabrera CNP                        2021  
   
                                                    Z68.42 - Body mass index [BM  
I]   
45.0-49.9, adult                        Medical Established Patient with   
Doreen Deborah CNP                        2021  
   
                                        Post-traumatic stress disorder BH Establ  
ished Patient with   
Leonie Short LISWS                     06/10/2021  
   
                                        Morbid obesity      Medical Established   
Patient with   
Doreen Deborah CNP                        06/10/2021  
   
                                                    Z68.42 - Body mass index [BM  
I]   
45.0-49.9, adult                        Medical Established Patient with   
Doreen Deborah CNP                        06/10/2021  
   
                                        Post-traumatic stress disorder BH Establ  
ished Patient with   
Leonie Short LISWS                     2021  
   
                                                    Assessment of visit for: bhargavi myles for   
human immunodeficiency virus            Medical New Patient with Doreen   
Deborah CNP                              2021  
   
                                                    Diabetes Risk Test Score was  
 one score   
2021                               Medical New Patient with Doreen   
Deborah CNP                              2021  
   
                                        Hypertension        Medical New Patient   
with Doreen   
Deborah CNP                              2021  
   
                                        Morbid obesity      Medical New Patient   
with Doreen   
Deborah CNP                              2021  
   
                                        Post-traumatic stress disorder Medical N  
ew Patient with Doreen   
Deborah CNP                              2021  
   
                                                    Z68.42 - Body mass index [BM  
I]   
45.0-49.9, adult                        Medical New Patient with Doreen   
Deborah CNP                              2021  
  
[facility]  
Work Phone: [phone] note*   
  
                                                    Diagnosis  
   
                                                      
  
  
Anxiety state- Primary  
  
  
Anxiety state, unspecified  
  
documented in this encounter  
[employer] Phone: [phone] note*   
  
                                                    Diagnosis  
   
                                                      
  
  
Bipolar 1 disorder (HCC)- Primary  
  
  
Bipolar I disorder, most recent episode (or current) unspecified  
   
                                                      
  
  
Homicidal ideation  
  
documented in this encounter  
[employer] Phone: [phone] note*   
  
                                                    Diagnosis  
   
                                                      
  
  
Tachycardia  
  
  
Tachycardia, unspecified  
  
documented in this encounter  
[employer] Phone: [phone] note  
  
Includes: Assessments for all patient encounters  
  
  
  
                                Findings        Encounter       Date  
   
                                                    [D50.9 - Iron deficiency ane  
jennifer,   
unspecified] iron deficiency anemia Chart Update with Doreen Cabrera CNP   
10/07/2024  
  
  
  
                                                    Last Documented On 10/07/202  
4 12:07PM ; [facility]  
  
  
  
                                        Attention-deficit hyperactivity disorder  
  Established Patient with Radha Young LSW                               2024  
  
  
  
                                                    Last Documented On   
4 4:15PM ; [facility]  
  
  
  
                                                    Bipolar I disorder, most rec  
ent   
episode, depressed - mild                Established Patient with Radha Young LSW                               2024  
  
  
  
                                                    Last Documented On   
4 4:15PM ; [facility]  
  
  
  
                                Nicotine dependence  Established Patient with   
Radha Young LSW 2024  
  
  
  
                                                    Last Documented On   
4 4:15PM ; [facility]  
  
  
  
                                        Post-traumatic stress disorder  Establ  
ished Patient with Radha Young   
LSW                                     2024  
  
  
  
                                                    Last Documented On   
4 4:15PM ; [facility]  
  
  
  
                                                    [Z68.43 - Body mass index [B  
MI]   
50.0-59.9, adult] assessment of body   
mass index                              Medical Established Patient with   
Doreen Deborah CNP                        2024  
  
  
  
                                                    Last Documented On 10/04/202  
4 1:56PM ; [facility]  
  
  
  
                                                    Bipolar I disorder, most rec  
ent   
episode, depressed - mild               Medical Established Patient with Doreen   
Deborah CNP                              2024  
  
  
  
                                                    Last Documented On 10/04/202  
4 1:56PM ; [facility]  
  
  
  
                                Caries          Medical Established Patient with  
 Doreen Deborah CNP 2024  
  
  
  
                                                    Last Documented On 10/04/202  
4 1:56PM ; [facility]  
  
  
  
                                        Encounter for Immunization Medical Estab  
lished Patient with Doreen Deborah   
CNP                                     2024  
  
  
  
                                                    Last Documented On 10/04/202  
4 1:56PM ; [facility]  
  
  
  
                                                    Type 2 diabetes mellitus wit  
hout   
complication                            Medical Established Patient with   
Doreen Deborah CNP                        2024  
  
  
  
                                                    Last Documented On 10/04/202  
4 1:56PM ; [facility]  
  
  
  
                                        Attention-deficit hyperactivity disorder  
  Established Patient with   
Leonie Short LISWS                     2024  
  
  
  
                                                    Last Documented On   
4 3:28PM ; [facility]  
  
  
  
                                                    Bipolar I disorder, most rec  
ent   
episode, depressed - mild                Established Patient with Leonie   
Short LISWS                             2024  
  
  
  
                                                    Last Documented On   
4 3:28PM ; [facility]  
  
  
  
                                        Post-traumatic stress disorder  Establ  
ished Patient with Leonie Short   
LISWS                                   2024  
  
  
  
                                                    Last Documented On   
4 3:28PM ; [facility]  
  
  
  
                                                    [E11.9 - Type 2 diabetes lobito  
litus   
without complications] type 2 diabetes   
mellitus                                Medical Established Patient with   
Doreen Deborah CNP                        2024  
  
  
  
                                                    Last Documented On   
4 7:36PM ; [facility]  
  
  
  
                                                    [F41.1 - Generalized anxiety  
   
disorder] generalized anxiety   
disorder                                Medical Established Patient with   
Doreen Cabrera CNP                        2024  
  
  
  
                                                    Last Documented On   
4 7:36PM ; [facility]  
  
  
  
                                                    [I10 - Essential (primary)   
hypertension] essential hypertension    Medical Established Patient with   
Doreen Carbera CNP                        2024  
  
  
  
                                                    Last Documented On   
4 7:36PM ; [facility]  
  
  
  
                                                    [N94.6 - Dysmenorrhea, unspe  
cified]   
dysmenorrhea                            Medical Established Patient with   
Doreen Cabrera CNP                        2024  
  
  
  
                                                    Last Documented On   
4 7:36PM ; [facility]  
  
  
  
                                                    [Z68.43 - Body mass index [B  
MI]   
50.0-59.9, adult] assessment of body   
mass index                              Medical Established Patient with   
Doreen Cabrera CNP                        2024  
  
  
  
                                                    Last Documented On   
4 7:36PM ; [facility]  
  
  
  
                                        Encounter for Immunization Medical Estab  
lished Patient with Doreen Cabrera CNP                                     2024  
  
  
  
                                                    Last Documented On   
4 7:36PM ; [facility]  
  
  
  
                                        Venipuncture was performed Medical Estab  
lished Patient with Doreen Cabrera CNP                                     2024  
  
  
  
                                                    Last Documented On   
4 7:36PM ; [facility]  
  
  
  
                                        Attention-deficit hyperactivity disorder  
  Established Patient with   
Leonie Short LISWS                     2024  
  
  
  
                                                    Last Documented On   
4 10:10AM ; [facility]  
  
  
  
                                                    Bipolar I disorder, most rec  
ent   
episode, depressed - mild               BH Established Patient with Leonie   
Short LISWS                             2024  
  
  
  
                                                    Last Documented On   
4 10:10AM ; [facility]  
  
  
  
                                        Post-traumatic stress disorder  Establ  
ished Patient with Leonie Short   
LISWS                                   2024  
  
  
  
                                                    Last Documented On   
4 10:10AM ; [facility]  
  
  
  
                                        [D64.9 - Anemia, unspecified] anemia Med  
ical Established Patient with   
Doreen Cabrera CNP                        2024  
  
  
  
                                                    Last Documented On   
4 6:51PM ; [facility]  
  
  
  
                                                    [Z68.43 - Body mass index [B  
MI]   
50.0-59.9, adult] assessment of body   
mass index                              Medical Established Patient with   
Doreen Cabrera CNP                        2024  
  
  
  
                                                    Last Documented On   
4 6:51PM ; [facility]  
  
  
  
                                                    Bipolar affective disorder,   
current   
episode depressed, mild                  Established Patient with Leonie   
Short LISWS                             2024  
  
  
  
                                                    Last Documented On   
4 5:16PM ; [facility]  
  
  
  
                                        Post-traumatic stress disorder BH Establ  
ished Patient with Leonie Short   
LISWS                                   2024  
  
  
  
                                                    Last Documented On   
4 5:16PM ; [facility]  
  
  
  
                                                    Undifferentiated attention d  
eficit   
disorder                                 Established Patient with   
Leonie Short LISWS                     2024  
  
  
  
                                                    Last Documented On   
4 5:16PM ; [facility]  
  
  
  
                                        Visit for: screening for disorder BH Est  
[facility]                             [facility]  
  
  
  
                                                    Last Documented On   
4 5:16PM ; [facility]  
  
  
  
                                                    [Z68.43 - Body mass index [B  
MI]   
50.0-59.9, adult] assessment of body   
mass index                              Medical Established Patient with   
Doreen Cabrera CNP                        2024  
  
  
  
                                                    Last Documented On   
4 7:50PM ; [facility]  
  
  
  
                                        Attention-deficit hyperactivity disorder  
 Medical Established Patient with   
Doreen Cabrera CNP                        2024  
  
  
  
                                                    Last Documented On   
4 7:50PM ; [facility]  
  
  
  
                                                    Diabetes Risk Test Score was  
 three   
score 3/27/2024                         Medical Established Patient with Doreen Cabrera CNP                              2024  
  
  
  
                                                    Last Documented On   
4 7:50PM ; [facility]  
  
  
  
                                        Visit for: screening for STD Medical Est  
ablished Patient with Doreen Cabrera   
CNP                                     2024  
  
  
  
                                                    Last Documented On   
4 7:50PM ; [facility]  
  
  
  
                                                    [Z68.32 - Body mass index [B  
MI]   
32.0-32.9, adult] assessment of body   
mass index                              Medical Established Patient with   
Doreen Cabrera CNP                        2023  
  
  
  
                                                    Last Documented On   
3 6:01PM ; [facility]  
  
  
  
                                        Borderline personality disorder Medical   
Established Patient with Doreenpaola Cabrera CNP                              2023  
  
  
  
                                                    Last Documented On   
3 6:01PM ; [facility]  
  
  
  
                                        Screening for diabetes mellitus Medical   
Established Patient with Doreenpaola Cabrera CNP                              2023  
  
  
  
                                                    Last Documented On   
3 6:01PM ; [facility]  
  
  
  
                                        Assessment of body mass index Medical Es  
tablished Patient with Doreenpaola Cabrera CNP                              10/21/2022  
  
  
  
                                                    Last Documented On 10/21/202  
2 2:45PM ; [facility]  
  
  
  
                                                    Bipolar affective disorder,   
current   
episode manic                            [facility] with Haylie   
Agu[facility]-S                        2022  
  
  
  
                                                    Last Documented On   
2 3:22PM ; [facility]  
  
  
  
                                                    Bipolar affective disorder,   
current   
episode manic                           BH Established Patient with Haylie   
Aguilar LPCC-S                        2022  
  
  
  
                                                    Last Documented On   
2 2:28PM ; [facility]  
  
  
  
                                                    Bipolar affective disorder,   
current   
episode depressed, severe with   
psychosis                                [facility] with Haylie   
Aguilar LPCC-S                        2022  
  
  
  
                                                    Last Documented On   
2 3:29PM ; [facility]  
  
  
  
                                                    Assessment of body mass inde  
x [Body   
mass index [BMI] 50.0-59.9, adult]      [facility] - Established with Julieth   
Aliya CNP                               2022  
  
  
  
                                                    Last Documented On   
2 9:36AM ; [facility]  
  
  
  
                                                    Bipolar I disorder, most rec  
ent   
episode, manic                          Open Access - Established with Julieth   
Aliya CNP                               2022  
  
  
  
                                                    Last Documented On   
2 9:36AM ; [facility]  
  
  
  
                                        Post-traumatic stress disorder BH Establ  
ished Patient with Haylie Aguilar   
LPCC-S                                  2022  
  
  
  
                                                    Last Documented On   
2 3:20PM ; [facility]  
  
  
  
                                No cough        Medical Established Patient with  
 Doreen Deborah CNP 2022  
  
  
  
                                                    Last Documented On   
2 4:04PM ; [facility]  
  
  
  
                                                    Z68.43 - Body mass index [BM  
I]   
50.0-59.9, adult                        Medical Established Patient with   
Doreen Deborah CNP                        2022  
  
  
  
                                                    Last Documented On   
2 4:04PM ; [facility]  
  
  
  
                                                    Borderline personality disor  
danny Pt   
reported hx of sx/dx                     Established Patient with Haylie   
Aguilar LPCC-S                        2022  
  
  
  
                                                    Last Documented On   
2 4:08PM ; [facility]  
  
  
  
                                                    Assessment of body mass inde  
x [Body   
mass index [BMI] 50.0-59.9, adult]      Open Access - Established with Julieth   
Aliya CNP                               2022  
  
  
  
                                                    Last Documented On   
2 7:41PM ; [facility]  
  
  
  
                                                    Diabetes Risk Test Score was  
 three   
score 2022                         Open Access - Established with Julieth   
Aliya CNP                               2022  
  
  
  
                                                    Last Documented On   
2 7:41PM ; [facility]  
  
  
  
                                                    Bipolar I disorder, most rec  
ent   
episode, manic                           [facility]   
[facility]                          [facility]  
  
  
  
                                                    Last Documented On   
1 1:35AM ; [facility]  
  
  
  
                                        Borderline personality disorder  Estab  
lished Patient with [facility]vis   
[facility]                          20[facility]  
  
  
  
                                                    Last Documented On   
1 1:35AM ; [facility]  
  
  
  
                                        Post-traumatic stress disorder  Establ  
ished Patient [facility]   
[facility]                          20[facility]  
  
  
  
                                                    Last Documented On   
1 1:35AM ; [facility]  
  
  
  
                                                    Assessment of visit for: bhargavi guevaraning for   
human immunodeficiency virus            Medical Established Patient with   
Doreen Deborah [facility]                        2021  
  
  
  
                                                    Last Documented On   
1 2:56PM ; [facility]  
  
  
  
                                Nicotine dependence Medical Established Patient   
with Doreen Deborah CNP 2021  
  
  
  
                                                    Last Documented On   
1 2:56PM ; [facility]  
  
  
  
                                Tachycardia     Medical Established Patient with  
 Doreen Deborah CNP 2021  
  
  
  
                                                    Last Documented On   
1 2:56PM ; [facility]  
  
  
  
                                                    Z68.43 - Body mass index [BM  
I]   
50.0-59.9, adult                        Medical Established Patient with   
Doreen Deborah [facility]                        2021  
  
  
  
                                                    Last Documented On   
1 2:56PM ; [facility]  
  
  
  
                                                    Bipolar I disorder, most rec  
ent   
episode, manic                           Established Patient with Leonie   
Short LISWS                             2021  
  
  
  
                                                    Last Documented On   
1 10:17AM ; [facility]  
  
  
  
                                                    Borderline personality disor  
danny per   
patient reported history                 Established Patient with Leonie   
Short LISWS                             2021  
  
  
  
                                                    Last Documented On   
1 10:17AM ; [facility]  
  
  
  
                                Nicotine dependence BH Established Patient with   
Leonie Short LISWS 2021  
  
  
  
                                                    Last Documented On   
1 10:17AM ; [facility]  
  
  
  
                                        Post-traumatic stress disorder  Establ  
ished Patient with Leonie Short   
LISWS                                   2021  
  
  
  
                                                    Last Documented On   
1 10:17AM ; [facility]  
  
  
  
                                Body mass index Medical Established Patient with  
 Doreen Deborah CNP 2021  
  
  
  
                                                    Last Documented On   
1 5:23PM ; [facility]  
  
  
  
                                Morbid obesity  Medical Established Patient with  
 Doreen Deborah CNP 2021  
  
  
  
                                                    Last Documented On   
1 5:23PM ; [facility]  
  
  
  
                                        Nicotine dependence uncomplicated Medica  
l Established Patient with Doreen   
Deobrah CNP                              2021  
  
  
  
                                                    Last Documented On   
1 5:23PM ; [facility]  
  
  
  
                                                    Z68.42 - Body mass index [BM  
I]   
45.0-49.9, adult                        Medical Established Patient with   
Doreen Deborah CNP                        2021  
  
  
  
                                                    Last Documented On   
1 5:23PM ; [facility]  
  
  
  
                                Episodic mood disorders On Call with Pamela edwards CNP 2021  
  
  
  
                                                    Last Documented On   
1 7:49AM ; [facility]  
  
  
  
                                                    Mood disorders, NOS per tabatha  
ent   
reported history                         Established Patient with Leonie   
Short LISWS                             2021  
  
  
  
                                                    Last Documented On   
1 7:15PM ; [facility]  
  
  
  
                                        Post-traumatic stress disorder  Establ  
ished Patient with Leonie Short   
LISWS                                   2021  
  
  
  
                                                    Last Documented On   
1 7:15PM ; [facility]  
  
  
  
                                Morbid obesity  Medical Established Patient with  
 Doreen Deborah CNP 2021  
  
  
  
                                                    Last Documented On   
1 12:31PM ; [facility]  
  
  
  
                                Otitis externa  Medical Established Patient with  
 Doreen Deborah CNP 2021  
  
  
  
                                                    Last Documented On   
1 12:31PM ; [facility]  
  
  
  
                                                    Z68.42 - Body mass index [BM  
I]   
45.0-49.9, adult                        Medical Established Patient with   
Doreen Deborah CNP                        2021  
  
  
  
                                                    Last Documented On   
1 12:31PM ; [facility]  
  
  
  
                                        Post-traumatic stress disorder  Establ  
ished Patient with Leonie Short   
LISWS                                   06/10/2021  
  
  
  
                                                    Last Documented On 06/10/202  
1 10:05PM ; [facility]  
  
  
  
                                Morbid obesity  Medical Established Patient with  
 Doreen Deborah CNP 06/10/2021  
  
  
  
                                                    Last Documented On 06/10/202  
1 4:45PM ; [facility]  
  
  
  
                                                    Z68.42 - Body mass index [BM  
I]   
45.0-49.9, adult                        Medical Established Patient with   
Doreen Deborah CNP                        06/10/2021  
  
  
  
                                                    Last Documented On 06/10/202  
1 4:45PM ; [facility]  
  
  
  
                                        Post-traumatic stress disorder  Establ  
ished Patient with Leonie Short   
LISWS                                   2021  
  
  
  
                                                    Last Documented On   
1 11:59AM ; [facility]  
  
  
  
                                                    Assessment of visit for: bhargavi guevaraning for   
human immunodeficiency virus            Medical New Patient with Doreen   
Deborah CNP                              2021  
  
  
  
                                                    Last Documented On   
1 4:06PM ; [facility]  
  
  
  
                                                    Diabetes Risk Test Score was  
 one score   
2021                               Medical New Patient with Doreen   
Deborah CNP                              2021  
  
  
  
                                                    Last Documented On   
1 4:06PM ; [facility]  
  
  
  
                                Hypertension    Medical New Patient with Doreen C  
cate CNP 2021  
  
  
  
                                                    Last Documented On   
1 4:06PM ; [facility]  
  
  
  
                                Morbid obesity  Medical New Patient with Doreen C  
cate CNP 2021  
  
  
  
                                                    Last Documented On   
1 4:06PM ; [facility]  
  
  
  
                                Post-traumatic stress disorder Medical New Patie  
nt with Doreen Deborah CNP   
2021  
  
  
  
                                                    Last Documented On   
1 4:06PM ; [facility]  
  
  
  
                                                    Z68.42 - Body mass index [BM  
I]   
45.0-49.9, adult                        Medical New Patient with Doreen   
Deborah CNP                              2021  
  
  
  
                                                    Last Documented On   
1 4:06PM ; [facility]  
Work Phone: [phone] of Present illness Narrative  
  
History of Present Illness not supported for this document type  
  
No History of Present Illness [facility]edHealth [facility]  
Work Phone: [phone] Discharge instructions* Instructions*   
  
Malika Shepherd MD - 2021  
  
  
  
Formatting of this note might be different from the original.  
Please take all medications as prescribed.  
  
Please follow up with your primary care physician by calling today, or as soon 
as possible, for thefirst available appointment. If you do not have a primary 
care physician, please contact a physician or clinic listed below today to 
establish care.  
  
Please return to the emergency department IMMEDIATELY if you develop 
uncontrolled fevers, uncontrolled vomiting, change in symptoms, worsening of 
symptoms, or ANY other concerns.  
  
  
  
  
* Attachments  
  
The following attachments cannot be sent through [facility] Everywhere.  
  
* Anxiety Disorder (English)  
  
[facility]  
Work Phone: [phone]  
  
Instructions not supported for this document type  
  
[facility]  
Work Phone: [phone]  
  
Includes: Instructions for all patient encounters  
  
  
  
                                                    Education and Decision Aids   
were provided during visit for:  
   
                                                    Counseling/education [Use fo  
r free text]  
   
                                                    Last Documented On   
4 7:40PM ; [facility]  
   
                                                    Discussed nutritional needs   
teach healthy choices including fruits and   
vegetables  
   
                                                    Last Documented On   
4 7:14PM ; [facility]  
   
                                                    Patient education about a pr  
oper diet  
   
                                                    Last Documented On   
4 7:14PM ; [facility]  
   
                                                    Discussed concerns about exe  
rcise : promote physical activity  
   
                                                    Last Documented On   
4 7:14PM ; [facility]  
   
                                                    Not requesting contraception  
   
                                                    Last Documented On   
4 7:14PM ; [facility]  
   
                                                    Discussed nutritional needs   
teach healthy choices including fruits and   
vegetables  
   
                                                    Last Documented On   
3 5:15PM ; [facility]  
   
                                                    Patient education about a pr  
oper diet  
   
                                                    Last Documented On   
3 5:15PM ; [facility]  
   
                                                    Discussed concerns about exe  
rcise : promote physical activity  
   
                                                    Last Documented On   
3 5:15PM ; [facility]  
   
                                                    Discussed nutritional needs   
teach healthy choices including fruits and   
vegetables  
   
                                                    Last Documented On 10/21/202  
2 1:42PM ; [facility]  
   
                                                    Patient education about a pr  
oper diet  
   
                                                    Last Documented On 10/21/202  
2 1:42PM ; [facility]  
   
                                                    Discussed concerns about exe  
rcise : promote physical activity  
   
                                                    Last Documented On 10/21/202  
2 1:42PM ; [facility] offered active listening  
 and supportive feedback; normalized emotions and   
feelings, also provided pt time to process any current stressors. ~Promoted and   
encouraged follow-through with scheduling psychiatric services  
   
                                                    Last Documented On   
2 3:21PM ; [facility] provided supportive, empa  
thic listening and reflective feedback. ~Explored,   
encouraged, and supported the pt to discuss current sx/mood, assess risk for   
harm/need, coping mechanisms, support network and safety planning. ~Supported 
pt's   
plan to f/up with Dr. Alonso, as planned at [place]. ~Encouraged 
pt to   
use safety plan, if needed to ensure she remains safe  
   
                                                    Last Documented On   
2 2:27PM ; [facility]  
   
                                                    Discussed nutritional needs   
teach healthy choices including fruits and   
vegetables  
   
                                                    Last Documented On   
2 3:20PM ; [facility]  
   
                                                    Patient education about a pr  
oper diet  
   
                                                    Last Documented On   
2 3:20PM ; [facility]  
   
                                                    Discussed concerns about exe  
rcise : promote physical activity ~ ~Will restart   
trazodone and prazosin ~ ~Patient is planning to have brother stay with her for 
a few   
days for emotional support ~ ~Follow up with PCP at next scheduled visit ~ ~Call
  
psychiatrist office to schedule appt ~ ~Call counselor  
   
                                                    Last Documented On   
2 9:35AM ; [facility]  
   
                                                    Provided supportive listenin  
g and empathic feedback; encouraged, explored, and   
supported the pt as she processed current symptoms, Issues, and concerns. 
~Discussed   
past tx and explored current needs/options. Acknowledged and validated pt's 
thoughts   
and emotions. ~Explored coping mechanisms and support network; utilized 
opportunity   
for safety planning; promoted seeking positive support and seeking help, as 
needed  
   
                                                    Last Documented On   
2 3:28PM ; [facility] provided active listenin  
g, support and helped pt process though current   
symptoms   
and stressor(s). Discussed and explored past effectiveness of medication; 
identified   
objectives and future goals; promoted use of healthy coping mechanisms, and 
self-care   
practices  
   
                                                    Last Documented On   
2 3:19PM ; [facility]  
   
                                                    Reviewed side effects and Ri  
sks/Benefits analysis  
   
                                                    Last Documented On   
2 3:19PM ; [facility]  
   
                                                    Discussed nutritional needs   
teach healthy choices including fruits and   
vegetables  
   
                                                    Last Documented On   
2 3:15PM ; [facility]  
   
                                                    Patient education about a pr  
oper diet  
   
                                                    Last Documented On   
2 3:15PM ; [facility]  
   
                                                    Discussed concerns about exe  
rcise : promote physical activity  
   
                                                    Last Documented On   
2 3:15PM ; [facility] introduced pt to [facility] in  
tegrated model of care ~P offered active listening  
and   
supportive feedback; normalized emotions and feelings, also provided pt time to   
process any current stressors ~P discussed potential benefits of counseling 
and   
supported re-engaging, as needed. ~P encouraged pt to continue to make time to
  
implement self-care regimen and use coping methods, as needed  
   
                                                    Last Documented On   
2 4:07PM ; [facility]  
   
                                                    Discussed nutritional needs   
teach healthy choices including fruits and   
vegetables  
   
                                                    Last Documented On   
2 3:15PM ; [facility]  
   
                                                    Patient education about a pr  
oper diet  
   
                                                    Last Documented On   
2 3:15PM ; [facility]  
   
                                                    Inquiry and counseling about  
 medication administration and compliance  
   
                                                    Last Documented On   
2 7:37PM ; [facility]  
   
                                                    Discussed concerns about exe  
rcise : promote physical activity  
   
                                                    Last Documented On   
2 3:15PM ; [facility]  
   
                                                    Patient goals discussed  
   
                                                    Last Documented On   
2 7:37PM ; [facility]  
   
                                                    Ansewred pt's questions re B  
orderlline Personality D/O and Bipolar D/O raised by  
  
psychiatrist at [place]. ~Validated and normalized patient?s feelings while   
assisting to process recent events  
   
                                                    Last Documented On   
1 1:33AM ; [facility]  
   
                                                    Discussed nutritional needs   
teach healthy choices including fruits and   
vegetables  
   
                                                    Last Documented On   
1 2:04PM ; [facility]  
   
                                                    Patient education about a pr  
oper diet  
   
                                                    Last Documented On   
1 2:04PM ; [facility]  
   
                                                    Discussed concerns about exe  
rcise : promote physical activity  
   
                                                    Last Documented On   
1 2:04PM ; [facility] provided active listenin  
g, support and helped patient process through   
current   
symptoms and stressors with ongoing mental health concerns and medication 
changes.   
[facility] discussed coping skills and supports that patient is implementing.  
discussed   
implementing coping skills as discussed with counseling and attending weekly   
appointments as scheduled with counselor. Patient was encouraged to continue 
writing   
down concerns with medications and discuss with providers. [facility] reminded patient
of   
crisis resources should they be needed. Patient reports having crisis resources 
and   
could return to ER  
   
                                                    Last Documented On   
1 10:17AM ; [facility]  
   
                                                    Patient education about a pr  
oper diet  
   
                                                    Last Documented On   
1 5:17PM ; [facility]  
   
                                                    Patient education about meal  
 planning  
   
                                                    Last Documented On  5:17PM ; [facility]  
   
                                                    Education about changing eat  
ing habits  
   
                                                    Last Documented On  5:17PM ; [facility]  
   
                                                    Patient education about high  
 fiber diet  
   
                                                    Last Documented On  5:17PM ; [facility]  
   
                                                    Patient education about low   
fat diet  
   
                                                    Last Documented On   
1 5:17PM ; [facility]  
   
                                                    Patient education about low   
cholesterol diet  
   
                                                    Last Documented On   
1 5:17PM ; [facility]  
   
                                                    Patient education about low   
carbohydrate diet  
   
                                                    Last Documented On   
1 5:17PM ; [facility]  
   
                                                    Patient education about high  
 protein diet  
   
                                                    Last Documented On  5:17PM ; [facility] offered active and suppo  
rtive listening, normalized emotions and feelings,   
and   
processed current stressors. [facility] discussed resources for finding a counselor 
and   
provided list of local resources. [facility] discussed patients coping skills and 
supports   
and encouraged patient to continue to implement. [facility] reminded patient of crisis
  
resources should they be needed  
   
                                                    Last Documented On   
1 7:15PM ; [facility]  
   
                                                    Discussed nutritional needs   
teach healthy choices including fruits and   
vegetables  
   
                                                    Last Documented On   
1 11:38AM ; [facility]  
   
                                                    Patient education about a pr  
oper diet  
   
                                                    Last Documented On   
1 11:38AM ; [facility]  
   
                                                    Patient education about a pr  
oper diet  
   
                                                    Last Documented On   
1 12:19PM ; [facility]  
   
                                                    Patient education about meal  
 planning  
   
                                                    Last Documented On   
1 12:19PM ; [facility]  
   
                                                    Education about changing eat  
ing habits  
   
                                                    Last Documented On   
1 12:19PM ; [facility]  
   
                                                    Patient education about high  
 fiber diet  
   
                                                    Last Documented On   
1 12:19PM ; [facility]  
   
                                                    Patient education about low   
fat diet  
   
                                                    Last Documented On   
1 12:19PM ; [facility]  
   
                                                    Patient education about low   
cholesterol diet  
   
                                                    Last Documented On   
1 12:19PM ; [facility]  
   
                                                    Patient education about low   
carbohydrate diet  
   
                                                    Last Documented On   
1 12:19PM ; [facility]  
   
                                                    Patient education about high  
 protein diet  
   
                                                    Last Documented On   
1 12:19PM ; [facility]  
   
                                                    Discussed concerns about exe  
rcise : promote physical activity  
   
                                                    Last Documented On   
1 11:38AM ; [facility]  
g, support and helped patient process through   
current   
symptoms and stressors related to family conflict. ~P discussed coping skills 
and   
supports with patient that can be implemented and reminded patient of ways to 
access   
additional resources. ~P discussed crisis resources and plan. Patient has 
crisis   
resources still available should they be needed  
   
                                                    Last Documented On 06/10/202  
1 7:04PM ; [facility]  
   
                                                    Discussed nutritional needs   
teach healthy choices including fruits and   
vegetables  
   
                                                    Last Documented On 06/10/202  
1 3:57PM ; [facility]  
   
                                                    Patient education about a pr  
oper diet  
   
                                                    Last Documented On 06/10/202  
1 3:57PM ; [facility]  
   
                                                    Discussed concerns about exe  
rcise : promote physical activity  
   
                                                    Last Documented On 06/10/202  
1 3:57PM ; [facility] model of [facility]. [facility] and PCP reassured   
patient of not sharing information with anyone unless she has signed a release 
for us   
to do so. ~P provided active listening, support and helped patient process 
through   
current symptoms and stressors. ~P discussed establishing counseling and   
psychiatry. [facility] discussed EMDR therapy and ways to find provider who does this 
type   
of therapy. ~P discussed crisis resources should mood worsen, P provided 
text   
hotline number for crisis. P reviewed crisis plan with patient and patient is 
able   
to contact positive supports and family when feeling down  
   
                                                    Last Documented On   
1 11:46AM ; [facility]  
   
                                                    Discussed nutritional needs   
teach healthy choices including fruits and   
vegetables  
   
                                                    Last Documented On   
1 2:11PM ; [facility]  
   
                                                    Patient education about a pr  
oper diet  
   
                                                    Last Documented On   
1 2:11PM ; [facility]  
   
                                                    Discussed concerns about exe  
rcise : promote physical activity  
   
                                                    Last Documented On   
1 2:11PM ; [facility]  
Work Phone: [phone]  
  
Includes: Instructions for all patient encounters  
  
  
  
                                                    Education and Decision Aids   
were provided during visit for:  
   
                                                    P offered active and suppo  
rtive listening and processed current stressors   
related   
to getting medications. ~P discussed coping skills and supports to implement 
in   
daily routine. ~[facility] discussed progress patient has felt they have made recently 
and   
encouraged continued follow-up with providers to address health  
   
                                                    Last Documented On   
4 5:16PM ; [facility]  
   
                                                    Discussed nutritional needs   
teach healthy choices including fruits and   
vegetables  
   
                                                    Last Documented On   
4 7:14PM ; [facility]  
   
                                                    Patient education about a pr  
oper diet  
   
                                                    Last Documented On   
4 7:14PM ; [facility]  
   
                                                    Discussed concerns about exe  
rcise : promote physical activity  
   
                                                    Last Documented On   
4 7:14PM ; [facility]  
   
                                                    Not requesting contraception  
   
                                                    Last Documented On   
4 7:14PM ; [facility]  
   
                                                    Discussed nutritional needs   
teach healthy choices including fruits and   
vegetables  
   
                                                    Last Documented On   
3 5:15PM ; [facility]  
   
                                                    Patient education about a pr  
oper diet  
   
                                                    Last Documented On   
3 5:15PM ; [facility]  
   
                                                    Discussed concerns about exe  
rcise : promote physical activity  
   
                                                    Last Documented On   
3 5:15PM ; [facility]  
   
                                                    Discussed nutritional needs   
teach healthy choices including fruits and   
vegetables  
   
                                                    Last Documented On 10/21/202  
2 1:42PM ; [facility]  
   
                                                    Patient education about a pr  
oper diet  
   
                                                    Last Documented On 10/21/202  
2 1:42PM ; [facility]  
   
                                                    Discussed concerns about exe  
rcise : promote physical activity  
   
                                                    Last Documented On 10/21/202  
2 1:42PM ; [facility] offered active listening  
 and supportive feedback; normalized emotions and   
feelings, also provided pt time to process any current stressors. ~Promoted and   
encouraged follow-through with scheduling psychiatric services  
   
                                                    Last Documented On   
2 3:21PM ; [facility] provided supportive, empa  
thic listening and reflective feedback. ~Explored,   
encouraged, and supported the pt to discuss current sx/mood, assess risk for   
harm/need, coping mechanisms, support network and safety planning. ~Supported 
pt's   
plan to f/up with Dr. Alonso, as planned at [place], [place]. ~Encouraged 
pt to   
use safety plan, if needed to ensure she remains safe  
   
                                                    Last Documented On   
2 2:27PM ; [facility]  
   
                                                    Discussed nutritional needs   
teach healthy choices including fruits and   
vegetables  
   
                                                    Last Documented On   
2 3:20PM ; [facility]  
   
                                                    Patient education about a pr  
oper diet  
   
                                                    Last Documented On   
2 3:20PM ; [facility]  
   
                                                    Discussed concerns about exe  
rcise : promote physical activity ~ ~Will restart   
trazodone and prazosin ~ ~Patient is planning to have brother stay with her for 
a few   
days for emotional support ~ ~Follow up with PCP at next scheduled visit ~ ~Call
  
psychiatrist office to schedule appt ~ ~Call counselor  
   
                                                    Last Documented On   
2 9:35AM ; [facility]  
   
                                                    Provided supportive listenin  
g and empathic feedback; encouraged, explored, and   
supported the pt as she processed current symptoms, Issues, and concerns. 
~Discussed   
past tx and explored current needs/options. Acknowledged and validated pt's 
thoughts   
and emotions. ~Explored coping mechanisms and support network; utilized 
opportunity   
for safety planning; promoted seeking positive support and seeking help, as 
needed  
   
                                                    Last Documented On   
2 3:28PM ; [facility]  
g, support and helped pt process though current   
symptoms   
and stressor(s). Discussed and explored past effectiveness of medication; 
identified   
objectives and future goals; promoted use of healthy coping mechanisms, and 
self-care   
practices  
   
                                                    Last Documented On   
2 3:19PM ; [facility]  
   
                                                    Reviewed side effects and Ri  
sks/Benefits analysis  
   
                                                    Last Documented On   
2 3:19PM ; [facility]  
   
                                                    Discussed nutritional needs   
teach healthy choices including fruits and   
vegetables  
   
                                                    Last Documented On   
2 3:15PM ; [facility]  
   
                                                    Patient education about a pr  
oper diet  
   
                                                    Last Documented On   
2 3:15PM ; [facility]  
   
                                                    Discussed concerns about exe  
rcise : promote physical activity  
   
                                                    Last Documented On   
2 3:15PM ; [facility] introduced pt to [facility] in  
tegrated model of care ~P offered active listening  
and   
supportive feedback; normalized emotions and feelings, also provided pt time to   
process any current stressors ~[facility] discussed potential benefits of counseling 
and   
supported re-engaging, as needed. ~[facility] encouraged pt to continue to make time to
  
implement self-care regimen and use coping methods, as needed  
   
                                                    Last Documented On   
2 4:07PM ; [facility]  
   
                                                    Discussed nutritional needs   
teach healthy choices including fruits and   
vegetables  
   
                                                    Last Documented On   
2 3:15PM ; [facility]  
   
                                                    Patient education about a pr  
oper diet  
   
                                                    Last Documented On   
2 3:15PM ; [facility]  
   
                                                    Inquiry and counseling about  
 medication administration and compliance  
   
                                                    Last Documented On   
2 7:37PM ; [facility]  
   
                                                    Discussed concerns about exe  
rcise : promote physical activity  
   
                                                    Last Documented On   
2 3:15PM ; [facility]  
   
                                                    Patient goals discussed  
   
                                                    Last Documented On   
2 7:37PM ; [facility]  
   
                                                    Ansewred pt's questions re B  
orderlline Personality D/O and Bipolar D/O raised by  
  
psychiatrist at [place]. ~Validated and normalized patient?s feelings while   
assisting to process recent events  
   
                                                    Last Documented On   
1 1:33AM ; [facility]  
   
                                                    Discussed nutritional needs   
teach healthy choices including fruits and   
vegetables  
   
                                                    Last Documented On   
1 2:04PM ; [facility]  
   
                                                    Patient education about a pr  
oper diet  
   
                                                    Last Documented On   
1 2:04PM ; [facility]  
   
                                                    Discussed concerns about exe  
rcise : promote physical activity  
   
                                                    Last Documented On   
1 2:04PM ; [facility] provided active listenin  
g, support and helped patient process through   
current   
symptoms and stressors with ongoing mental health concerns and medication 
changes.   
~[facility] discussed coping skills and supports that patient is implementing.  
discussed   
implementing coping skills as discussed with counseling and attending weekly   
appointments as scheduled with counselor. Patient was encouraged to continue 
writing   
down concerns with medications and discuss with providers. [facility] reminded patient
of   
crisis resources should they be needed. Patient reports having crisis resources 
and   
could return to ER  
   
                                                    Last Documented On   
1 10:17AM ; [facility]  
   
                                                    Patient education about a pr  
oper diet  
   
                                                    Last Documented On   
1 5:17PM ; [facility]  
   
                                                    Patient education about meal  
 planning  
   
                                                    Last Documented On  5:17PM ; [facility]  
   
                                                    Education about changing eat  
ing habits  
   
                                                    Last Documented On   
1 5:17PM ; [facility]  
   
                                                    Patient education about high  
 fiber diet  
   
                                                    Last Documented On  5:17PM ; [facility]  
   
                                                    Patient education about low   
fat diet  
   
                                                    Last Documented On  5:17PM ; [facility]  
   
                                                    Patient education about low   
cholesterol diet  
   
                                                    Last Documented On  5:17PM ; [facility]  
   
                                                    Patient education about low   
carbohydrate diet  
   
                                                    Last Documented On  5:17PM ; [facility]  
   
                                                    Patient education about high  
 protein diet  
   
                                                    Last Documented On  5:17PM ; [facility] offered active and suppo  
rtive listening, normalized emotions and feelings,   
and   
processed current stressors. [facility] discussed resources for finding a counselor 
and   
provided list of local resources. [facility] discussed patients coping skills and 
supports   
and encouraged patient to continue to implement. [facility] reminded patient of crisis
  
resources should they be needed  
   
                                                    Last Documented On   
1 7:15PM ; [facility]  
   
                                                    Discussed nutritional needs   
teach healthy choices including fruits and   
vegetables  
   
                                                    Last Documented On   
1 11:38AM ; [facility]  
   
                                                    Patient education about a pr  
oper diet  
   
                                                    Last Documented On   
1 11:38AM ; [facility]  
   
                                                    Patient education about a pr  
oper diet  
   
                                                    Last Documented On   
1 12:19PM ; [facility]  
   
                                                    Patient education about meal  
 planning  
   
                                                    Last Documented On   
1 12:19PM ; [facility]  
   
                                                    Education about changing eat  
ing habits  
   
                                                    Last Documented On   
1 12:19PM ; [facility]  
   
                                                    Patient education about high  
 fiber diet  
   
                                                    Last Documented On   
1 12:19PM ; [facility]  
   
                                                    Patient education about low   
fat diet  
   
                                                    Last Documented On   
1 12:19PM ; [facility]  
   
                                                    Patient education about low   
cholesterol diet  
   
                                                    Last Documented On   
1 12:19PM ; [facility]  
   
                                                    Patient education about low   
carbohydrate diet  
   
                                                    Last Documented On   
1 12:19PM ; [facility]  
   
                                                    Patient education about high  
 protein diet  
   
                                                    Last Documented On   
1 12:19PM ; [facility]  
   
                                                    Discussed concerns about exe  
rcise : promote physical activity  
   
                                                    Last Documented On   
1 11:38AM ; [facility] provided active listenin  
g, support and helped patient process through   
current   
symptoms and stressors related to family conflict. ~[facility] discussed coping skills 
and   
supports with patient that can be implemented and reminded patient of ways to 
access   
additional resources. ~[facility] discussed crisis resources and plan. Patient has 
crisis   
resources still available should they be needed  
   
                                                    Last Documented On 06/10/202  
1 7:04PM ; [facility]  
   
                                                    Discussed nutritional needs   
teach healthy choices including fruits and   
vegetables  
   
                                                    Last Documented On 06/10/202  
1 3:57PM ; [facility]  
   
                                                    Patient education about a pr  
oper diet  
   
                                                    Last Documented On 06/10/202  
1 3:57PM ; [facility]  
   
                                                    Discussed concerns about exe  
rcise : promote physical activity  
   
                                                    Last Documented On 06/10/202  
1 3:57PM ; [facility] model of [facility]. [facility] and PCP reassured   
patient of not sharing information with anyone unless she has signed a release 
for us   
to do so. ~[facility] provided active listening, support and helped patient process 
through   
current symptoms and stressors. ~[facility] discussed establishing counseling and   
psychiatry. [facility] discussed EMDR therapy and ways to find provider who does this 
type   
of therapy. ~[facility] discussed crisis resources should mood worsen, [facility] provided 
text   
hotline number for crisis. [facility] reviewed crisis plan with patient and patient is 
able   
to contact positive supports and family when feeling down  
   
                                                    Last Documented On   
1 11:46AM ; [facility] [facility]  
   
                                                    Discussed nutritional needs   
teach healthy choices including fruits and   
vegetables  
   
                                                    Last Documented On   
1 2:11PM ; [facility]  
   
                                                    Patient education about a pr  
oper diet  
   
                                                    Last Documented On   
1 2:11PM ; [facility]  
   
                                                    Discussed concerns about exe  
rcise : promote physical activity  
   
                                                    Last Documented On   
1 2:11PM ; [facility]  
Work Phone: [phone]  
  
Includes: Instructions for all patient encounters  
  
  
  
                                                    Education and Decision Aids   
were provided during visit for:  
   
                                                    [facility] offered active and suppo  
rtive listening and processed current stressors   
related   
to getting medications. ~[facility] discussed coping skills and supports to implement 
in   
daily routine. ~P discussed progress patient has felt they have made recently 
and   
encouraged continued follow-up with providers to address health  
   
                                                    Last Documented On   
4 5:16PM ; [facility]  
   
                                                    Discussed nutritional needs   
teach healthy choices including fruits and   
vegetables  
   
                                                    Last Documented On   
4 7:14PM ; [facility]  
   
                                                    Patient education about a pr  
oper diet  
   
                                                    Last Documented On   
4 7:14PM ; [facility]  
   
                                                    Discussed concerns about exe  
rcise : promote physical activity  
   
                                                    Last Documented On   
4 7:14PM ; [facility]  
   
                                                    Not requesting contraception  
   
                                                    Last Documented On   
4 7:14PM ; [facility]  
   
                                                    Discussed nutritional needs   
teach healthy choices including fruits and   
vegetables  
   
                                                    Last Documented On   
3 5:15PM ; [facility]  
   
                                                    Patient education about a pr  
oper diet  
   
                                                    Last Documented On   
3 5:15PM ; [facility]  
   
                                                    Discussed concerns about exe  
rcise : promote physical activity  
   
                                                    Last Documented On   
3 5:15PM ; [facility]  
   
                                                    Discussed nutritional needs   
teach healthy choices including fruits and   
vegetables  
   
                                                    Last Documented On 10/21/202  
2 1:42PM ; [facility]  
   
                                                    Patient education about a pr  
oper diet  
   
                                                    Last Documented On 10/21/202  
2 1:42PM ; [facility]  
   
                                                    Discussed concerns about exe  
rcise : promote physical activity  
   
                                                    Last Documented On 10/21/202  
2 1:42PM ; [facility]  
 and supportive feedback; normalized emotions and   
feelings, also provided pt time to process any current stressors. ~Promoted and   
encouraged follow-through with scheduling psychiatric services  
   
                                                    Last Documented On   
2 3:21PM ; [facility] provided supportive, empa  
thic listening and reflective feedback. ~Explored,   
encouraged, and supported the pt to discuss current sx/mood, assess risk for   
harm/need, coping mechanisms, support network and safety planning. ~Supported 
pt's   
plan to f/up with Dr. Alonso, as planned at [place], [place]. ~Encouraged 
pt to   
use safety plan, if needed to ensure she remains safe  
   
                                                    Last Documented On   
2 2:27PM ; [facility]  
   
                                                    Discussed nutritional needs   
teach healthy choices including fruits and   
vegetables  
   
                                                    Last Documented On   
2 3:20PM ; [facility]  
   
                                                    Patient education about a pr  
oper diet  
   
                                                    Last Documented On   
2 3:20PM ; [facility]  
   
                                                    Discussed concerns about exe  
rcise : promote physical activity ~ ~Will restart   
trazodone and prazosin ~ ~Patient is planning to have brother stay with her for 
a few   
days for emotional support ~ ~Follow up with PCP at next scheduled visit ~ ~Call
  
psychiatrist office to schedule appt ~ ~Call counselor  
   
                                                    Last Documented On   
2 9:35AM ; [facility]  
   
                                                    Provided supportive listenin  
g and empathic feedback; encouraged, explored, and   
supported the pt as she processed current symptoms, Issues, and concerns. 
~Discussed   
past tx and explored current needs/options. Acknowledged and validated pt's 
thoughts   
and emotions. ~Explored coping mechanisms and support network; utilized 
opportunity   
for safety planning; promoted seeking positive support and seeking help, as 
needed  
   
                                                    Last Documented On   
2 3:28PM ; [facility] provided active listenin  
g, support and helped pt process though current   
symptoms   
and stressor(s). Discussed and explored past effectiveness of medication; 
identified   
objectives and future goals; promoted use of healthy coping mechanisms, and 
self-care   
practices  
   
                                                    Last Documented On   
2 3:19PM ; [facility]  
   
                                                    Reviewed side effects and Ri  
sks/Benefits analysis  
   
                                                    Last Documented On   
2 3:19PM ; [facility]  
   
                                                    Discussed nutritional needs   
teach healthy choices including fruits and   
vegetables  
   
                                                    Last Documented On   
2 3:15PM ; [facility]  
   
                                                    Patient education about a pr  
oper diet  
   
                                                    Last Documented On   
2 3:15PM ; [facility]  
   
                                                    Discussed concerns about exe  
rcise : promote physical activity  
   
                                                    Last Documented On   
2 3:15PM ; [facility] introduced pt to [facility] in  
tegrated model of care ~P offered active listening  
and   
supportive feedback; normalized emotions and feelings, also provided pt time to   
process any current stressors ~[facility] discussed potential benefits of counseling 
and   
supported re-engaging, as needed. ~P encouraged pt to continue to make time to
  
implement self-care regimen and use coping methods, as needed  
   
                                                    Last Documented On   
2 4:07PM ; [facility]  
   
                                                    Discussed nutritional needs   
teach healthy choices including fruits and   
vegetables  
   
                                                    Last Documented On   
2 3:15PM ; [facility]  
   
                                                    Patient education about a pr  
oper diet  
   
                                                    Last Documented On   
2 3:15PM ; [facility]  
   
                                                    Inquiry and counseling about  
 medication administration and compliance  
   
                                                    Last Documented On   
2 7:37PM ; [facility]  
   
                                                    Discussed concerns about exe  
rcise : promote physical activity  
   
                                                    Last Documented On   
2 3:15PM ; [facility]  
   
                                                    Patient goals discussed  
   
                                                    Last Documented On   
2 7:37PM ; [facility]  
   
                                                    Ansewred pt's questions re B  
orderlline Personality D/O and Bipolar D/O raised by  
  
psychiatrist at [place]. ~Validated and normalized patient?s feelings while   
assisting to process recent events  
   
                                                    Last Documented On   
1 1:33AM ; [facility]  
   
                                                    Discussed nutritional needs   
teach healthy choices including fruits and   
vegetables  
   
                                                    Last Documented On   
1 2:04PM ; [facility]  
   
                                                    Patient education about a pr  
oper diet  
   
                                                    Last Documented On   
1 2:04PM ; [facility]  
   
                                                    Discussed concerns about exe  
rcise : promote physical activity  
   
                                                    Last Documented On   
1 2:04PM ; [facility] provided active listenin  
g, support and helped patient process through   
current   
symptoms and stressors with ongoing mental health concerns and medication 
changes.   
~[facility] discussed coping skills and supports that patient is implementing.  
discussed   
implementing coping skills as discussed with counseling and attending weekly   
appointments as scheduled with counselor. Patient was encouraged to continue 
writing   
down concerns with medications and discuss with providers. ~[facility] reminded patient
of   
crisis resources should they be needed. Patient reports having crisis resources 
and   
could return to ER  
   
                                                    Last Documented On   
1 10:17AM ; [facility]  
   
                                                    Patient education about a pr  
oper diet  
   
                                                    Last Documented On   
1 5:17PM ; [facility]  
   
                                                    Patient education about meal  
 planning  
   
                                                    Last Documented On  5:17PM ; [facility]  
   
                                                    Education about changing eat  
ing habits  
   
                                                    Last Documented On   
1 5:17PM ; [facility]  
   
                                                    Patient education about high  
 fiber diet  
   
                                                    Last Documented On  5:17PM ; [facility]  
   
                                                    Patient education about low   
fat diet  
   
                                                    Last Documented On  5:17PM ; [facility]  
   
                                                    Patient education about low   
cholesterol diet  
   
                                                    Last Documented On  5:17PM ; [facility]  
   
                                                    Patient education about low   
carbohydrate diet  
   
                                                    Last Documented On  5:17PM ; [facility]  
   
                                                    Patient education about high  
 protein diet  
   
                                                    Last Documented On  5:17PM ; [facility] offered active and suppo  
rtive listening, normalized emotions and feelings,   
and   
processed current stressors. ~[facility] discussed resources for finding a counselor 
and   
provided list of local resources. ~[facility] discussed patients coping skills and 
supports   
and encouraged patient to continue to implement. [facility] reminded patient of crisis
  
resources should they be needed  
   
                                                    Last Documented On  7:15PM ; [facility]  
   
                                                    Discussed nutritional needs   
teach healthy choices including fruits and   
vegetables  
   
                                                    Last Documented On  11:38AM ; [facility]  
   
                                                    Patient education about a pr  
oper diet  
   
                                                    Last Documented On  11:38AM ; [facility]  
   
                                                    Patient education about a pr  
oper diet  
   
                                                    Last Documented On  12:19PM ; [facility]  
   
                                                    Patient education about meal  
 planning  
   
                                                    Last Documented On  12:19PM ; [facility]  
   
                                                    Education about changing eat  
ing habits  
   
                                                    Last Documented On  12:19PM ; [facility]  
   
                                                    Patient education about high  
 fiber diet  
   
                                                    Last Documented On  12:19PM ; [facility]  
   
                                                    Patient education about low   
fat diet  
   
                                                    Last Documented On  12:19PM ; [facility]  
   
                                                    Patient education about low   
cholesterol diet  
   
                                                    Last Documented On  12:19PM ; [facility]  
   
                                                    Patient education about low   
carbohydrate diet  
   
                                                    Last Documented On   
1 12:19PM ; [facility]  
   
                                                    Patient education about high  
 protein diet  
   
                                                    Last Documented On   
1 12:19PM ; [facility]  
   
                                                    Discussed concerns about exe  
rcise : promote physical activity  
   
                                                    Last Documented On   
1 11:38AM ; [facility] provided active listenin  
g, support and helped patient process through   
current   
symptoms and stressors related to family conflict. ~[facility] discussed coping skills 
and   
supports with patient that can be implemented and reminded patient of ways to 
access   
additional resources. ~[facility] discussed crisis resources and plan. Patient has 
crisis   
resources still available should they be needed  
   
                                                    Last Documented On 06/10/202  
1 7:04PM ; [facility]  
   
                                                    Discussed nutritional needs   
teach healthy choices including fruits and   
vegetables  
   
                                                    Last Documented On 06/10/202  
1 3:57PM ; [facility]  
   
                                                    Patient education about a pr  
oper diet  
   
                                                    Last Documented On 06/10/202  
1 3:57PM ; [facility]  
   
                                                    Discussed concerns about exe  
rcise : promote physical activity  
   
                                                    Last Documented On 06/10/202  
1 3:57PM ; [facility]. P and PCP reassured   
patient of not sharing information with anyone unless she has signed a release 
for us   
to do so. ~[facility] provided active listening, support and helped patient process 
through   
current symptoms and stressors. ~[facility] discussed establishing counseling and   
psychiatry. [facility] discussed EMDR therapy and ways to find provider who does this 
type   
of therapy. ~[facility] discussed crisis resources should mood worsen, [facility] provided 
text   
hotline number for crisis. [facility] reviewed crisis plan with patient and patient is 
able   
to contact positive supports and family when feeling down  
   
                                                    Last Documented On   
1 11:46AM ; [facility]  
   
                                                    Discussed nutritional needs   
teach healthy choices including fruits and   
vegetables  
   
                                                    Last Documented On   
1 2:11PM ; [facility]  
   
                                                    Patient education about a pr  
oper diet  
   
                                                    Last Documented On   
1 2:11PM ; [facility]  
   
                                                    Discussed concerns about exe  
rcise : promote physical activity  
   
                                                    Last Documented On   
1 2:11PM ; [facility]  
Work Phone: [phone]  
  
Includes: Instructions for all patient encounters  
  
  
  
                                                    Education and Decision Aids   
were provided during visit for:  
   
                                                    P offered active and suppo  
rtive listening and processed current stressors.   
~[facility]   
discussed coping skills and supports that can be implemented to manage increased
  
anxiety and stressors. [facility] discussed potential of resuming counseling, even if 
for   
monthly visits to process ongoing stressors. ~[facility] encouraged patient to follow-
up on   
referrals as discussed with PCP  
   
                                                    Last Documented On   
4 10:08AM ; [facility]  
   
                                                    Discussed nutritional needs   
teach healthy choices including fruits and   
vegetables  
   
                                                    Last Documented On   
4 4:46PM ; [facility]  
   
                                                    Patient education about a pr  
oper diet  
   
                                                    Last Documented On   
4 4:46PM ; [facility]  
   
                                                    Discussed concerns about exe  
rcise : promote physical activity  
   
                                                    Last Documented On   
4 4:46PM ; [facility]  
   
                                                    Not requesting contraception  
   
                                                    Last Documented On   
4 4:46PM ; [facility] offered active and suppo  
rtive listening and processed current stressors   
related   
to getting medications. ~[facility] discussed coping skills and supports to implement 
in   
daily routine. ~[facility] discussed progress patient has felt they have made recently 
and   
encouraged continued follow-up with providers to address health  
   
                                                    Last Documented On   
4 5:16PM ; [facility]  
   
                                                    Discussed nutritional needs   
teach healthy choices including fruits and   
vegetables  
   
                                                    Last Documented On   
4 7:14PM ; [facility]  
   
                                                    Patient education about a pr  
oper diet  
   
                                                    Last Documented On   
4 7:14PM ; [facility]  
   
                                                    Discussed concerns about exe  
rcise : promote physical activity  
   
                                                    Last Documented On   
4 7:14PM ; [facility]  
   
                                                    Not requesting contraception  
   
                                                    Last Documented On   
4 7:14PM ; [facility]  
   
                                                    Discussed nutritional needs   
teach healthy choices including fruits and   
vegetables  
   
                                                    Last Documented On   
3 5:15PM ; [facility]  
   
                                                    Patient education about a pr  
oper diet  
   
                                                    Last Documented On   
3 5:15PM ; [facility]  
   
                                                    Discussed concerns about exe  
rcise : promote physical activity  
   
                                                    Last Documented On   
3 5:15PM ; [facility]  
   
                                                    Discussed nutritional needs   
teach healthy choices including fruits and   
vegetables  
   
                                                    Last Documented On 10/21/202  
2 1:42PM ; [facility]  
   
                                                    Patient education about a pr  
oper diet  
   
                                                    Last Documented On 10/21/202  
2 1:42PM ; [facility]  
   
                                                    Discussed concerns about exe  
rcise : promote physical activity  
   
                                                    Last Documented On 10/21/202  
2 1:42PM ; [facility] offered active listening  
 and supportive feedback; normalized emotions and   
feelings, also provided pt time to process any current stressors. ~Promoted and   
encouraged follow-through with scheduling psychiatric services  
   
                                                    Last Documented On   
2 3:21PM ; [facility] provided supportive, empa  
thic listening and reflective feedback. ~Explored,   
encouraged, and supported the pt to discuss current sx/mood, assess risk for   
harm/need, coping mechanisms, support network and safety planning. ~Supported 
pt's   
plan to f/up with Dr. Alonso, as planned at [place], [place]. ~Encouraged 
pt to   
use safety plan, if needed to ensure she remains safe  
   
                                                    Last Documented On   
2 2:27PM ; [facility]  
   
                                                    Discussed nutritional needs   
teach healthy choices including fruits and   
vegetables  
   
                                                    Last Documented On   
2 3:20PM ; [facility]  
   
                                                    Patient education about a pr  
oper diet  
   
                                                    Last Documented On   
2 3:20PM ; [facility]  
   
                                                    Discussed concerns about exe  
rcise : promote physical activity ~ ~Will restart   
trazodone and prazosin ~ ~Patient is planning to have brother stay with her for 
a few   
days for emotional support ~ ~Follow up with PCP at next scheduled visit ~ ~Call
  
psychiatrist office to schedule appt ~ ~Call counselor  
   
                                                    Last Documented On   
2 9:35AM ; [facility]  
   
                                                    Provided supportive listenin  
g and empathic feedback; encouraged, explored, and   
supported the pt as she processed current symptoms, Issues, and concerns. 
~Discussed   
past tx and explored current needs/options. Acknowledged and validated pt's 
thoughts   
and emotions. ~Explored coping mechanisms and support network; utilized 
opportunity   
for safety planning; promoted seeking positive support and seeking help, as 
needed  
   
                                                    Last Documented On   
2 3:28PM ; [facility] provided active listenin  
g, support and helped pt process though current   
symptoms   
and stressor(s). Discussed and explored past effectiveness of medication; 
identified   
objectives and future goals; promoted use of healthy coping mechanisms, and 
self-care   
practices  
   
                                                    Last Documented On   
2 3:19PM ; [facility]  
   
                                                    Reviewed side effects and Ri  
sks/Benefits analysis  
   
                                                    Last Documented On   
2 3:19PM ; [facility]  
   
                                                    Discussed nutritional needs   
teach healthy choices including fruits and   
vegetables  
   
                                                    Last Documented On   
2 3:15PM ; [facility]  
   
                                                    Patient education about a pr  
oper diet  
   
                                                    Last Documented On   
2 3:15PM ; [facility]  
   
                                                    Discussed concerns about exe  
rcise : promote physical activity  
   
                                                    Last Documented On   
2 3:15PM ; [facility] introduced pt to [facility] in  
tegrated model of care ~P offered active listening  
and   
supportive feedback; normalized emotions and feelings, also provided pt time to   
process any current stressors ~P discussed potential benefits of counseling 
and   
supported re-engaging, as needed. ~P encouraged pt to continue to make time to
  
implement self-care regimen and use coping methods, as needed  
   
                                                    Last Documented On   
2 4:07PM ; [facility]  
   
                                                    Discussed nutritional needs   
teach healthy choices including fruits and   
vegetables  
   
                                                    Last Documented On   
2 3:15PM ; [facility]  
   
                                                    Patient education about a pr  
oper diet  
   
                                                    Last Documented On   
2 3:15PM ; [facility]  
   
                                                    Inquiry and counseling about  
 medication administration and compliance  
   
                                                    Last Documented On   
2 7:37PM ; [facility]  
   
                                                    Discussed concerns about exe  
rcise : promote physical activity  
   
                                                    Last Documented On   
2 3:15PM ; [facility]  
   
                                                    Patient goals discussed  
   
                                                    Last Documented On   
2 7:37PM ; [facility]  
   
                                                    Ansewred pt's questions re B  
orderlline Personality D/O and Bipolar D/O raised by  
  
psychiatrist at [place]. ~Validated and normalized patient?s feelings while   
assisting to process recent events  
   
                                                    Last Documented On   
1 1:33AM ; [facility]  
   
                                                    Discussed nutritional needs   
teach healthy choices including fruits and   
vegetables  
   
                                                    Last Documented On   
1 2:04PM ; [facility]  
   
                                                    Patient education about a pr  
oper diet  
   
                                                    Last Documented On   
1 2:04PM ; [facility]  
   
                                                    Discussed concerns about exe  
rcise : promote physical activity  
   
                                                    Last Documented On   
1 2:04PM ; [facility] provided active listenin  
g, support and helped patient process through   
current   
symptoms and stressors with ongoing mental health concerns and medication 
changes.   
[facility] discussed coping skills and supports that patient is implementing.  
discussed   
implementing coping skills as discussed with counseling and attending weekly   
appointments as scheduled with counselor. Patient was encouraged to continue 
writing   
down concerns with medications and discuss with providers. [facility] reminded patient
of   
crisis resources should they be needed. Patient reports having crisis resources 
and   
could return to ER  
   
                                                    Last Documented On   
1 10:17AM ; [facility]  
   
                                                    Patient education about a pr  
oper diet  
   
                                                    Last Documented On  5:17PM ; [facility]  
   
                                                    Patient education about meal  
 planning  
   
                                                    Last Documented On  5:17PM ; [facility]  
   
                                                    Education about changing eat  
ing habits  
   
                                                    Last Documented On  5:17PM ; [facility]  
   
                                                    Patient education about high  
 fiber diet  
   
                                                    Last Documented On  5:17PM ; [facility]  
   
                                                    Patient education about low   
fat diet  
   
                                                    Last Documented On   
1 5:17PM ; [facility]  
   
                                                    Patient education about low   
cholesterol diet  
   
                                                    Last Documented On   
1 5:17PM ; [facility]  
   
                                                    Patient education about low   
carbohydrate diet  
   
                                                    Last Documented On   
1 5:17PM ; [facility]  
   
                                                    Patient education about high  
 protein diet  
   
                                                    Last Documented On  5:17PM ; [facility] offered active and suppo  
rtive listening, normalized emotions and feelings,   
and   
processed current stressors. [facility] discussed resources for finding a counselor 
and   
provided list of local resources. [facility] discussed patients coping skills and 
supports   
and encouraged patient to continue to implement. [facility] reminded patient of crisis
  
resources should they be needed  
   
                                                    Last Documented On   
1 7:15PM ; [facility]  
   
                                                    Discussed nutritional needs   
teach healthy choices including fruits and   
vegetables  
   
                                                    Last Documented On   
1 11:38AM ; [facility]  
   
                                                    Patient education about a pr  
oper diet  
   
                                                    Last Documented On   
1 11:38AM ; [facility]  
   
                                                    Patient education about a pr  
oper diet  
   
                                                    Last Documented On   
1 12:19PM ; [facility]  
   
                                                    Patient education about meal  
 planning  
   
                                                    Last Documented On   
1 12:19PM ; [facility]  
   
                                                    Education about changing eat  
ing habits  
   
                                                    Last Documented On   
1 12:19PM ; [facility]  
   
                                                    Patient education about high  
 fiber diet  
   
                                                    Last Documented On   
1 12:19PM ; [facility]  
   
                                                    Patient education about low   
fat diet  
   
                                                    Last Documented On   
1 12:19PM ; [facility]  
   
                                                    Patient education about low   
cholesterol diet  
   
                                                    Last Documented On   
1 12:19PM ; [facility]  
   
                                                    Patient education about low   
carbohydrate diet  
   
                                                    Last Documented On   
1 12:19PM ; [facility]  
   
                                                    Patient education about high  
 protein diet  
   
                                                    Last Documented On   
1 12:19PM ; [facility]  
   
                                                    Discussed concerns about exe  
rcise : promote physical activity  
   
                                                    Last Documented On   
1 11:38AM ; [facility]  
[facility], support and helped patient process through   
current   
symptoms and stressors related to family conflict. ~P discussed coping skills 
and   
supports with patient that can be implemented and reminded patient of ways to 
access   
additional resources. ~P discussed crisis resources and plan. Patient has 
crisis   
resources still available should they be needed  
   
                                                    Last Documented On 06/10/202  
1 7:04PM ; [facility]  
   
                                                    Discussed nutritional needs   
teach healthy choices including fruits and   
vegetables  
   
                                                    Last Documented On 06/10/202  
1 3:57PM ; [facility]  
   
                                                    Patient education about a pr  
oper diet  
   
                                                    Last Documented On 06/10/202  
1 3:57PM ; [facility]  
   
                                                    Discussed concerns about exe  
rcise : promote physical activity  
   
                                                    Last Documented On 06/10/202  
1 3:57PM ; [facility]. [facility] and PCP reassured   
patient of not sharing information with anyone unless she has signed a release 
for us   
to do so. ~P provided active listening, support and helped patient process 
through   
current symptoms and stressors. ~P discussed establishing counseling and   
psychiatry. [facility] discussed EMDR therapy and ways to find provider who does this 
type   
of therapy. ~P discussed crisis resources should mood worsen, P provided 
text   
hotline number for crisis. P reviewed crisis plan with patient and patient is 
able   
to contact positive supports and family when feeling down  
   
                                                    Last Documented On   
1 11:46AM ; [facility]  
   
                                                    Discussed nutritional needs   
teach healthy choices including fruits and   
vegetables  
   
                                                    Last Documented On   
1 2:11PM ; [facility]  
   
                                                    Patient education about a pr  
oper diet  
   
                                                    Last Documented On   
1 2:11PM ; [facility]  
   
                                                    Discussed concerns about exe  
rcise : promote physical activity  
   
                                                    Last Documented On   
1 2:11PM ; [facility]  
Work Phone: [phone]  
  
Includes: Instructions for all patient encounters  
  
  
  
                                                    Education and Decision Aids   
were provided during visit for:  
   
                                                    Counseling/education [Use fo  
r free text]  
   
                                                    Last Documented On   
4 6:27PM ; [facility]  
   
                                                    Discussed nutritional needs   
teach healthy choices including fruits and   
vegetables  
   
                                                    Last Documented On   
4 4:51PM ; [facility]  
   
                                                    Patient education about a pr  
oper diet  
   
                                                    Last Documented On   
4 4:51PM ; [facility]  
   
                                                    Discussed concerns about exe  
rcise : promote physical activity  
   
                                                    Last Documented On   
4 4:51PM ; [facility]  
   
                                                    Referred Patient to a [facility]  
es Self-Management Program  
   
                                                    Last Documented On   
4 5:22PM ; [facility]  
   
                                                    [facility] offered active and suppo  
rtive listening and processed current stressors.   
~P   
discussed coping skills and supports that can be implemented to manage increased
  
anxiety and stressors. [facility] discussed potential of resuming counseling, even if 
for   
monthly visits to process ongoing stressors. ~P encouraged patient to follow-
up on   
referrals as discussed with PCP  
   
                                                    Last Documented On   
4 10:08AM ; [facility]  
   
                                                    Discussed nutritional needs   
teach healthy choices including fruits and   
vegetables  
   
                                                    Last Documented On   
4 4:46PM ; [facility]  
   
                                                    Patient education about a pr  
oper diet  
   
                                                    Last Documented On   
4 4:46PM ; [facility]  
   
                                                    Discussed concerns about exe  
rcise : promote physical activity  
   
                                                    Last Documented On   
4 4:46PM ; [facility]  
   
                                                    Not requesting contraception  
   
                                                    Last Documented On   
4 4:46PM ; [facility] and suppo  
rtive listening and processed current stressors   
related   
to getting medications. ~P discussed coping skills and supports to implement 
in   
daily routine. ~P discussed progress patient has felt they have made recently 
and   
encouraged continued follow-up with providers to address health  
   
                                                    Last Documented On   
4 5:16PM ; [facility]  
   
                                                    Discussed nutritional needs   
teach healthy choices including fruits and   
vegetables  
   
                                                    Last Documented On   
4 7:14PM ; [facility]  
   
                                                    Patient education about a pr  
oper diet  
   
                                                    Last Documented On   
4 7:14PM ; [facility]  
   
                                                    Discussed concerns about exe  
rcise : promote physical activity  
   
                                                    Last Documented On   
4 7:14PM ; [facility]  
   
                                                    Not requesting contraception  
   
                                                    Last Documented On   
4 7:14PM ; [facility]  
   
                                                    Discussed nutritional needs   
teach healthy choices including fruits and   
vegetables  
   
                                                    Last Documented On   
3 5:15PM ; [facility]  
   
                                                    Patient education about a pr  
oper diet  
   
                                                    Last Documented On   
3 5:15PM ; [facility]  
   
                                                    Discussed concerns about exe  
rcise : promote physical activity  
   
                                                    Last Documented On   
3 5:15PM ; [facility]  
   
                                                    Discussed nutritional needs   
teach healthy choices including fruits and   
vegetables  
   
                                                    Last Documented On 10/21/202  
2 1:42PM ; [facility]  
   
                                                    Patient education about a pr  
oper diet  
   
                                                    Last Documented On 10/21/202  
2 1:42PM ; [facility]  
   
                                                    Discussed concerns about exe  
rcise : promote physical activity  
   
                                                    Last Documented On 10/21/202  
2 1:42PM ; [facility]  
 and supportive feedback; normalized emotions and   
feelings, also provided pt time to process any current stressors. ~Promoted and   
encouraged follow-through with scheduling psychiatric services  
   
                                                    Last Documented On   
2 3:21PM ; [facility] provided supportive, empa  
thic listening and reflective feedback. ~Explored,   
encouraged, and supported the pt to discuss current sx/mood, assess risk for   
harm/need, coping mechanisms, support network and safety planning. ~Supported 
pt's   
plan to f/up with Dr. Alonso, as planned at [place]. ~Encouraged 
pt to   
use safety plan, if needed to ensure she remains safe  
   
                                                    Last Documented On   
2 2:27PM ; [facility]  
   
                                                    Discussed nutritional needs   
teach healthy choices including fruits and   
vegetables  
   
                                                    Last Documented On   
2 3:20PM ; [facility]  
   
                                                    Patient education about a pr  
oper diet  
   
                                                    Last Documented On   
2 3:20PM ; [facility]  
   
                                                    Discussed concerns about exe  
rcise : promote physical activity ~ ~Will restart   
trazodone and prazosin ~ ~Patient is planning to have brother stay with her for 
a few   
days for emotional support ~ ~Follow up with PCP at next scheduled visit ~ ~Call
  
psychiatrist office to schedule appt ~ ~Call counselor  
   
                                                    Last Documented On   
2 9:35AM ; [facility]  
   
                                                    Provided supportive listenin  
g and empathic feedback; encouraged, explored, and   
supported the pt as she processed current symptoms, Issues, and concerns. 
~Discussed   
past tx and explored current needs/options. Acknowledged and validated pt's 
thoughts   
and emotions. ~Explored coping mechanisms and support network; utilized 
opportunity   
for safety planning; promoted seeking positive support and seeking help, as 
needed  
   
                                                    Last Documented On   
2 3:28PM ; [facility] provided active listenin  
g, support and helped pt process though current   
symptoms   
and stressor(s). Discussed and explored past effectiveness of medication; 
identified   
objectives and future goals; promoted use of healthy coping mechanisms, and 
self-care   
practices  
   
                                                    Last Documented On   
2 3:19PM ; [facility]  
   
                                                    Reviewed side effects and Ri  
sks/Benefits analysis  
   
                                                    Last Documented On   
2 3:19PM ; [facility]  
   
                                                    Discussed nutritional needs   
teach healthy choices including fruits and   
vegetables  
   
                                                    Last Documented On   
2 3:15PM ; [facility]  
   
                                                    Patient education about a pr  
oper diet  
   
                                                    Last Documented On   
2 3:15PM ; [facility]  
   
                                                    Discussed concerns about exe  
rcise : promote physical activity  
   
                                                    Last Documented On   
2 3:15PM ; [facility] introduced pt to [facility] in  
tegrated model of care ~[facility] offered active listening  
and   
supportive feedback; normalized emotions and feelings, also provided pt time to   
process any current stressors ~[facility] discussed potential benefits of counseling 
and   
supported re-engaging, as needed. ~[facility] encouraged pt to continue to make time to
  
implement self-care regimen and use coping methods, as needed  
   
                                                    Last Documented On   
2 4:07PM ; [facility]  
   
                                                    Discussed nutritional needs   
teach healthy choices including fruits and   
vegetables  
   
                                                    Last Documented On   
2 3:15PM ; [facility]  
   
                                                    Patient education about a pr  
oper diet  
   
                                                    Last Documented On   
2 3:15PM ; [facility]  
   
                                                    Inquiry and counseling about  
 medication administration and compliance  
   
                                                    Last Documented On   
2 7:37PM ; [facility]  
   
                                                    Discussed concerns about exe  
rcise : promote physical activity  
   
                                                    Last Documented On   
2 3:15PM ; [facility]  
   
                                                    Patient goals discussed  
   
                                                    Last Documented On   
2 7:37PM ; [facility]  
   
                                                    Ansewred pt's questions re B  
orderlline Personality D/O and Bipolar D/O raised by  
  
psychiatrist at [place]. ~Validated and normalized patient?s feelings while   
assisting to process recent events  
   
                                                    Last Documented On   
1 1:33AM ; [facility]  
   
                                                    Discussed nutritional needs   
teach healthy choices including fruits and   
vegetables  
   
                                                    Last Documented On   
1 2:04PM ; [facility]  
   
                                                    Patient education about a pr  
oper diet  
   
                                                    Last Documented On   
1 2:04PM ; [facility]  
   
                                                    Discussed concerns about exe  
rcise : promote physical activity  
   
                                                    Last Documented On   
1 2:04PM ; [facility]  
   
                                                    [facility] provided active listenin  
g, support and helped patient process through   
current   
symptoms and stressors with ongoing mental health concerns and medication 
changes.   
~[facility] discussed coping skills and supports that patient is implementing.  
discussed   
implementing coping skills as discussed with counseling and attending weekly   
appointments as scheduled with counselor. Patient was encouraged to continue 
writing   
down concerns with medications and discuss with providers. ~P reminded patient
of   
crisis resources should they be needed. Patient reports having crisis resources 
and   
could return to ER  
   
                                                    Last Documented On   
1 10:17AM ; [facility]  
   
                                                    Patient education about a pr  
oper diet  
   
                                                    Last Documented On   
1 5:17PM ; [facility]  
   
                                                    Patient education about meal  
 planning  
   
                                                    Last Documented On   
1 5:17PM ; [facility]  
   
                                                    Education about changing eat  
ing habits  
   
                                                    Last Documented On   
1 5:17PM ; [facility]  
   
                                                    Patient education about high  
 fiber diet  
   
                                                    Last Documented On   
1 5:17PM ; [facility]  
   
                                                    Patient education about low   
fat diet  
   
                                                    Last Documented On   
1 5:17PM ; [facility]  
   
                                                    Patient education about low   
cholesterol diet  
   
                                                    Last Documented On   
1 5:17PM ; [facility]  
   
                                                    Patient education about low   
carbohydrate diet  
   
                                                    Last Documented On   
1 5:17PM ; [facility]  
   
                                                    Patient education about high  
 protein diet  
   
                                                    Last Documented On   
1 5:17PM ; [facility] offered active and suppo  
rtive listening, normalized emotions and feelings,   
and   
processed current stressors. ~[facility] discussed resources for finding a counselor 
and   
provided list of local resources. ~[facility] discussed patients coping skills and 
supports   
and encouraged patient to continue to implement. [facility] reminded patient of crisis
  
resources should they be needed  
   
                                                    Last Documented On  7:15PM ; [facility]  
   
                                                    Discussed nutritional needs   
teach healthy choices including fruits and   
vegetables  
   
                                                    Last Documented On  11:38AM ; [facility]  
   
                                                    Patient education about a pr  
oper diet  
   
                                                    Last Documented On   
1 11:38AM ; [facility]  
   
                                                    Patient education about a pr  
oper diet  
   
                                                    Last Documented On   
1 12:19PM ; [facility]  
   
                                                    Patient education about meal  
 planning  
   
                                                    Last Documented On   
1 12:19PM ; [facility]  
   
                                                    Education about changing eat  
ing habits  
   
                                                    Last Documented On   
1 12:19PM ; [facility]  
   
                                                    Patient education about high  
 fiber diet  
   
                                                    Last Documented On  12:19PM ; [facility]  
   
                                                    Patient education about low   
fat diet  
   
                                                    Last Documented On   
1 12:19PM ; [facility]  
   
                                                    Patient education about low   
cholesterol diet  
   
                                                    Last Documented On   
1 12:19PM ; [facility]  
   
                                                    Patient education about low   
carbohydrate diet  
   
                                                    Last Documented On   
1 12:19PM ; [facility]  
   
                                                    Patient education about high  
 protein diet  
   
                                                    Last Documented On   
1 12:19PM ; [facility]  
   
                                                    Discussed concerns about exe  
rcise : promote physical activity  
   
                                                    Last Documented On   
1 11:38AM ; [facility] provided active listenin  
g, support and helped patient process through   
current   
symptoms and stressors related to family conflict. ~[facility] discussed coping skills 
and   
supports with patient that can be implemented and reminded patient of ways to 
access   
additional resources. ~[facility] discussed crisis resources and plan. Patient has 
crisis   
resources still available should they be needed  
   
                                                    Last Documented On 06/10/202  
1 7:04PM ; [facility]  
   
                                                    Discussed nutritional needs   
teach healthy choices including fruits and   
vegetables  
   
                                                    Last Documented On 06/10/202  
1 3:57PM ; [facility]  
   
                                                    Patient education about a pr  
oper diet  
   
                                                    Last Documented On 06/10/202  
1 3:57PM ; [facility]  
   
                                                    Discussed concerns about exe  
rcise : promote physical activity  
   
                                                    Last Documented On 06/10/202  
1 3:57PM ; [facility]. [facility] and PCP reassured   
patient of not sharing information with anyone unless she has signed a release 
for us   
to do so. ~P provided active listening, support and helped patient process 
through   
current symptoms and stressors. ~P discussed establishing counseling and   
psychiatry. [facility] discussed EMDR therapy and ways to find provider who does this 
type   
of therapy. ~[facility] discussed crisis resources should mood worsen, [facility] provided 
text   
hotline number for crisis. [facility] reviewed crisis plan with patient and patient is 
able   
to contact positive supports and family when feeling down  
   
                                                    Last Documented On   
1 11:46AM ; [facility]  
   
                                                    Discussed nutritional needs   
teach healthy choices including fruits and   
vegetables  
   
                                                    Last Documented On   
1 2:11PM ; [facility]  
   
                                                    Patient education about a pr  
oper diet  
   
                                                    Last Documented On   
1 2:11PM ; [facility]  
   
                                                    Discussed concerns about exe  
rcise : promote physical activity  
   
                                                    Last Documented On   
1 2:11PM ; [facility]  
Work Phone: [phone]  
  
Includes: Instructions for all patient encounters  
  
  
  
                                                    Education and Decision Aids   
were provided during visit for:  
   
                                                    P offered active and suppo  
rtive listening and processed recent stressors. ~[facility]  
  
discussed coping skills and supports to implement in managing increased anxiety 
such   
as tools learned previously in therapy. ~P discussed sleep hygiene strategies 
that   
can be implemented. ~[facility] encouraged patient to follow-up with specialists as   
scheduled  
   
                                                    Last Documented On   
4 3:26PM ; [facility]  
   
                                                    Discussed nutritional needs   
teach healthy choices including fruits and   
vegetables  
   
                                                    Last Documented On   
4 4:51PM ; [facility]  
   
                                                    Patient education about a pr  
oper diet  
   
                                                    Last Documented On   
4 4:51PM ; [facility]  
   
                                                    Discussed concerns about exe  
rcise : promote physical activity  
   
                                                    Last Documented On   
4 4:51PM ; [facility]  
   
                                                    Referred Patient to a [facility]  
es Self-Management Program  
   
                                                    Last Documented On   
4 5:22PM ; [facility] offered active and suppo  
rtive listening and processed current stressors.   
~P   
discussed coping skills and supports that can be implemented to manage increased
  
anxiety and stressors. P discussed potential of resuming counseling, even if 
for   
monthly visits to process ongoing stressors. ~[facility] encouraged patient to follow-
up on   
referrals as discussed with PCP  
   
                                                    Last Documented On   
4 10:08AM ; [facility]  
   
                                                    Discussed nutritional needs   
teach healthy choices including fruits and   
vegetables  
   
                                                    Last Documented On   
4 4:46PM ; [facility]  
   
                                                    Patient education about a pr  
oper diet  
   
                                                    Last Documented On   
4 4:46PM ; [facility]  
   
                                                    Discussed concerns about exe  
rcise : promote physical activity  
   
                                                    Last Documented On   
4 4:46PM ; [facility]  
   
                                                    Not requesting contraception  
   
                                                    Last Documented On   
4 4:46PM ; [facility] offered active and suppo  
rtive listening and processed current stressors   
related   
to getting medications. ~[facility] discussed coping skills and supports to implement 
in   
daily routine. ~[facility] discussed progress patient has felt they have made recently 
and   
encouraged continued follow-up with providers to address health  
   
                                                    Last Documented On   
4 5:16PM ; [facility]  
   
                                                    Discussed nutritional needs   
teach healthy choices including fruits and   
vegetables  
   
                                                    Last Documented On   
4 7:14PM ; [facility]  
   
                                                    Patient education about a pr  
oper diet  
   
                                                    Last Documented On   
4 7:14PM ; [facility]  
   
                                                    Discussed concerns about exe  
rcise : promote physical activity  
   
                                                    Last Documented On   
4 7:14PM ; [facility]  
   
                                                    Not requesting contraception  
   
                                                    Last Documented On   
4 7:14PM ; [facility]  
   
                                                    Discussed nutritional needs   
teach healthy choices including fruits and   
vegetables  
   
                                                    Last Documented On   
3 5:15PM ; [facility]  
   
                                                    Patient education about a pr  
oper diet  
   
                                                    Last Documented On   
3 5:15PM ; [facility]  
   
                                                    Discussed concerns about exe  
rcise : promote physical activity  
   
                                                    Last Documented On   
3 5:15PM ; [facility]  
   
                                                    Discussed nutritional needs   
teach healthy choices including fruits and   
vegetables  
   
                                                    Last Documented On 10/21/202  
2 1:42PM ; [facility]  
   
                                                    Patient education about a pr  
oper diet  
   
                                                    Last Documented On 10/21/202  
2 1:42PM ; [facility]  
   
                                                    Discussed concerns about exe  
rcise : promote physical activity  
   
                                                    Last Documented On 10/21/202  
2 1:42PM ; [facility]  
 and supportive feedback; normalized emotions and   
feelings, also provided pt time to process any current stressors. ~Promoted and   
encouraged follow-through with scheduling psychiatric services  
   
                                                    Last Documented On   
2 3:21PM ; [facility] provided supportive, empa  
thic listening and reflective feedback. ~Explored,   
encouraged, and supported the pt to discuss current sx/mood, assess risk for   
harm/need, coping mechanisms, support network and safety planning. ~Supported 
pt's   
plan to f/up with Dr. Alonso, as planned at [place], [place]. ~Encouraged 
pt to   
use safety plan, if needed to ensure she remains safe  
   
                                                    Last Documented On   
2 2:27PM ; [facility]  
   
                                                    Discussed nutritional needs   
teach healthy choices including fruits and   
vegetables  
   
                                                    Last Documented On   
2 3:20PM ; [facility]  
   
                                                    Patient education about a pr  
oper diet  
   
                                                    Last Documented On   
2 3:20PM ; [facility]  
   
                                                    Discussed concerns about exe  
rcise : promote physical activity ~ ~Will restart   
trazodone and prazosin ~ ~Patient is planning to have brother stay with her for 
a few   
days for emotional support ~ ~Follow up with PCP at next scheduled visit ~ ~Call
  
psychiatrist office to schedule appt ~ ~Call counselor  
   
                                                    Last Documented On   
2 9:35AM ; [facility]  
   
                                                    Provided supportive listenin  
g and empathic feedback; encouraged, explored, and   
supported the pt as she processed current symptoms, Issues, and concerns. 
~Discussed   
past tx and explored current needs/options. Acknowledged and validated pt's 
thoughts   
and emotions. ~Explored coping mechanisms and support network; utilized 
opportunity   
for safety planning; promoted seeking positive support and seeking help, as 
needed  
   
                                                    Last Documented On   
2 3:28PM ; [facility] provided active listenin  
g, support and helped pt process though current   
symptoms   
and stressor(s). Discussed and explored past effectiveness of medication; 
identified   
objectives and future goals; promoted use of healthy coping mechanisms, and 
self-care   
practices  
   
                                                    Last Documented On   
2 3:19PM ; [facility]  
   
                                                    Reviewed side effects and Ri  
sks/Benefits analysis  
   
                                                    Last Documented On   
2 3:19PM ; [facility]  
   
                                                    Discussed nutritional needs   
teach healthy choices including fruits and   
vegetables  
   
                                                    Last Documented On   
2 3:15PM ; [facility]  
   
                                                    Patient education about a pr  
oper diet  
   
                                                    Last Documented On   
2 3:15PM ; [facility]  
   
                                                    Discussed concerns about exe  
rcise : promote physical activity  
   
                                                    Last Documented On   
2 3:15PM ; [facility] introduced pt to [facility] in  
tegrated model of care ~[facility] offered active listening  
and   
supportive feedback; normalized emotions and feelings, also provided pt time to   
process any current stressors ~[facility] discussed potential benefits of counseling 
and   
supported re-engaging, as needed. ~[facility] encouraged pt to continue to make time to
  
implement self-care regimen and use coping methods, as needed  
   
                                                    Last Documented On   
2 4:07PM ; [facility]  
   
                                                    Discussed nutritional needs   
teach healthy choices including fruits and   
vegetables  
   
                                                    Last Documented On   
2 3:15PM ; [facility]  
   
                                                    Patient education about a pr  
oper diet  
   
                                                    Last Documented On   
2 3:15PM ; [facility]  
   
                                                    Inquiry and counseling about  
 medication administration and compliance  
   
                                                    Last Documented On   
2 7:37PM ; [facility]  
   
                                                    Discussed concerns about exe  
rcise : promote physical activity  
   
                                                    Last Documented On   
2 3:15PM ; [facility]  
   
                                                    Patient goals discussed  
   
                                                    Last Documented On   
2 7:37PM ; [facility]  
   
                                                    Ansewred pt's questions re B  
orderlline Personality D/O and Bipolar D/O raised by  
  
psychiatrist at [place]. ~Validated and normalized patient?s feelings while   
assisting to process recent events  
   
                                                    Last Documented On   
1 1:33AM ; [facility]  
   
                                                    Discussed nutritional needs   
teach healthy choices including fruits and   
vegetables  
   
                                                    Last Documented On   
1 2:04PM ; [facility]  
   
                                                    Patient education about a pr  
oper diet  
   
                                                    Last Documented On   
1 2:04PM ; [facility]  
   
                                                    Discussed concerns about exe  
rcise : promote physical activity  
   
                                                    Last Documented On   
1 2:04PM ; [facility] provided active listenin  
g, support and helped patient process through   
current   
symptoms and stressors with ongoing mental health concerns and medication 
changes.   
[facility] Center discussed coping skills and supports that patient is implementing.  
discussed   
implementing coping skills as discussed with counseling and attending weekly   
appointments as scheduled with counselor. Patient was encouraged to continue 
writing   
down concerns with medications and discuss with providers. ~P reminded patient
of   
crisis resources should they be needed. Patient reports having crisis resources 
and   
could return to ER  
   
                                                    Last Documented On   
1 10:17AM ; [facility]  
   
                                                    Patient education about a pr  
oper diet  
   
                                                    Last Documented On   
1 5:17PM ; [facility]  
   
                                                    Patient education about meal  
 planning  
   
                                                    Last Documented On  5:17PM ; [facility]  
   
                                                    Education about changing eat  
ing habits  
   
                                                    Last Documented On  5:17PM ; [facility]  
   
                                                    Patient education about high  
 fiber diet  
   
                                                    Last Documented On   
1 5:17PM ; [facility]  
   
                                                    Patient education about low   
fat diet  
   
                                                    Last Documented On   
1 5:17PM ; [facility]  
   
                                                    Patient education about low   
cholesterol diet  
   
                                                    Last Documented On   
1 5:17PM ; [facility]  
   
                                                    Patient education about low   
carbohydrate diet  
   
                                                    Last Documented On   
1 5:17PM ; [facility]  
   
                                                    Patient education about high  
 protein diet  
   
                                                    Last Documented On   
1 5:17PM ; [facility] offered active and suppo  
rtive listening, normalized emotions and feelings,   
and   
processed current stressors. ~P discussed resources for finding a counselor 
and   
provided list of local resources. ~P discussed patients coping skills and 
supports   
and encouraged patient to continue to implement. ~P reminded patient of crisis
  
resources should they be needed  
   
                                                    Last Documented On   
1 7:15PM ; [facility]  
   
                                                    Discussed nutritional needs   
teach healthy choices including fruits and   
vegetables  
   
                                                    Last Documented On   
1 11:38AM ; [facility]  
   
                                                    Patient education about a pr  
oper diet  
   
                                                    Last Documented On   
1 11:38AM ; [facility]  
   
                                                    Patient education about a pr  
oper diet  
   
                                                    Last Documented On   
1 12:19PM ; [facility]  
   
                                                    Patient education about meal  
 planning  
   
                                                    Last Documented On   
1 12:19PM ; [facility]  
   
                                                    Education about changing eat  
ing habits  
   
                                                    Last Documented On   
1 12:19PM ; [facility]  
   
                                                    Patient education about high  
 fiber diet  
   
                                                    Last Documented On   
1 12:19PM ; [facility]  
   
                                                    Patient education about low   
fat diet  
   
                                                    Last Documented On   
1 12:19PM ; [facility]  
   
                                                    Patient education about low   
cholesterol diet  
   
                                                    Last Documented On   
1 12:19PM ; [facility]  
   
                                                    Patient education about low   
carbohydrate diet  
   
                                                    Last Documented On   
1 12:19PM ; [facility]  
   
                                                    Patient education about high  
 protein diet  
   
                                                    Last Documented On   
1 12:19PM ; [facility]  
   
                                                    Discussed concerns about exe  
rcise : promote physical activity  
   
                                                    Last Documented On   
1 11:38AM ; [facility] provided active listenin  
g, support and helped patient process through   
current   
symptoms and stressors related to family conflict. ~[facility] discussed coping skills 
and   
supports with patient that can be implemented and reminded patient of ways to 
access   
additional resources. ~[facility] discussed crisis resources and plan. Patient has 
crisis   
resources still available should they be needed  
   
                                                    Last Documented On 06/10/202  
1 7:04PM ; [facility]  
   
                                                    Discussed nutritional needs   
teach healthy choices including fruits and   
vegetables  
   
                                                    Last Documented On 06/10/202  
1 3:57PM ; [facility]  
   
                                                    Patient education about a pr  
oper diet  
   
                                                    Last Documented On 06/10/202  
1 3:57PM ; [facility]  
   
                                                    Discussed concerns about exe  
rcise : promote physical activity  
   
                                                    Last Documented On 06/10/202  
1 3:57PM ; [facility]. [facility] and PCP reassured   
patient of not sharing information with anyone unless she has signed a release 
for us   
to do so. ~[facility] provided active listening, support and helped patient process 
through   
current symptoms and stressors. ~[facility] discussed establishing counseling and   
psychiatry. P discussed EMDR therapy and ways to find provider who does this 
type   
of therapy. ~[facility] discussed crisis resources should mood worsen, [facility] provided 
text   
hotline number for crisis. [facility] reviewed crisis plan with patient and patient is 
able   
to contact positive supports and family when feeling down  
   
                                                    Last Documented On   
1 11:46AM ; [facility]  
   
                                                    Discussed nutritional needs   
teach healthy choices including fruits and   
vegetables  
   
                                                    Last Documented On   
1 2:11PM ; [facility]  
   
                                                    Patient education about a pr  
oper diet  
   
                                                    Last Documented On   
1 2:11PM ; [facility]  
   
                                                    Discussed concerns about exe  
rcise : promote physical activity  
   
                                                    Last Documented On   
1 2:11PM ; [facility]  
Work Phone: [phone]  
  
Includes: Instructions for all patient encounters  
  
  
  
                                                    Education and Decision Aids   
were provided during visit for:  
   
                                                    ~*[facility] offered active and sup  
portive listening, normalized emotions and feelings,  
and   
processed ~current stressors. ~*Discussed engageing in counseling and looking 
into   
EMDR to address PTSD and increased nightmares  
   
                                                    Last Documented On   
4 4:14PM ; [facility]  
   
                                                    Discussed nutritional needs   
teach healthy choices including fruits and   
vegetables  
   
                                                    Last Documented On   
4 4:33PM ; [facility]  
   
                                                    Patient education about a pr  
oper diet  
   
                                                    Last Documented On   
4 4:33PM ; [facility]  
   
                                                    Discussed concerns about exe  
rcise : promote physical activity  
   
                                                    Last Documented On   
4 4:33PM ; [facility] offered active and suppo  
rtive listening and processed recent stressors. ~[facility]  
  
discussed coping skills and supports to implement in managing increased anxiety 
such   
as tools learned previously in therapy. ~[facility] discussed sleep hygiene strategies 
that   
can be implemented. ~[facility] encouraged patient to follow-up with specialists as   
scheduled  
   
                                                    Last Documented On   
4 3:26PM ; [facility]  
   
                                                    Discussed nutritional needs   
teach healthy choices including fruits and   
vegetables  
   
                                                    Last Documented On   
4 4:51PM ; [facility]  
   
                                                    Patient education about a pr  
oper diet  
   
                                                    Last Documented On   
4 4:51PM ; [facility]  
   
                                                    Discussed concerns about exe  
rcise : promote physical activity  
   
                                                    Last Documented On   
4 4:51PM ; [facility]  
   
                                                    Referred Patient to a [facility]  
es Self-Management Program  
   
                                                    Last Documented On   
4 5:22PM ; [facility] and suppo  
rtive listening and processed current stressors.   
~P   
discussed coping skills and supports that can be implemented to manage increased
  
anxiety and stressors. P discussed potential of resuming counseling, even if 
for   
monthly visits to process ongoing stressors. ~[facility] encouraged patient to follow-
up on   
referrals as discussed with PCP  
   
                                                    Last Documented On   
4 10:08AM ; [facility]  
   
                                                    Discussed nutritional needs   
teach healthy choices including fruits and   
vegetables  
   
                                                    Last Documented On   
4 4:46PM ; [facility]  
   
                                                    Patient education about a pr  
oper diet  
   
                                                    Last Documented On   
4 4:46PM ; [facility]  
   
                                                    Discussed concerns about exe  
rcise : promote physical activity  
   
                                                    Last Documented On   
4 4:46PM ; [facility]  
   
                                                    Not requesting contraception  
   
                                                    Last Documented On   
4 4:46PM ; [facility] offered active and suppo  
rtive listening and processed current stressors   
related   
to getting medications. ~[facility] discussed coping skills and supports to implement 
in   
daily routine. ~[facility] discussed progress patient has felt they have made recently 
and   
encouraged continued follow-up with providers to address health  
   
                                                    Last Documented On   
4 5:16PM ; [facility]  
   
                                                    Discussed nutritional needs   
teach healthy choices including fruits and   
vegetables  
   
                                                    Last Documented On   
4 7:14PM ; [facility]  
   
                                                    Patient education about a pr  
oper diet  
   
                                                    Last Documented On   
4 7:14PM ; [facility]  
   
                                                    Discussed concerns about exe  
rcise : promote physical activity  
   
                                                    Last Documented On   
4 7:14PM ; [facility]  
   
                                                    Not requesting contraception  
   
                                                    Last Documented On   
4 7:14PM ; [facility]  
   
                                                    Discussed nutritional needs   
teach healthy choices including fruits and   
vegetables  
   
                                                    Last Documented On   
3 5:15PM ; [facility]  
   
                                                    Patient education about a pr  
oper diet  
   
                                                    Last Documented On   
3 5:15PM ; [facility]  
   
                                                    Discussed concerns about exe  
rcise : promote physical activity  
   
                                                    Last Documented On   
3 5:15PM ; [facility]  
   
                                                    Discussed nutritional needs   
teach healthy choices including fruits and   
vegetables  
   
                                                    Last Documented On 10/21/202  
2 1:42PM ; [facility]  
   
                                                    Patient education about a pr  
oper diet  
   
                                                    Last Documented On 10/21/202  
2 1:42PM ; [facility]  
   
                                                    Discussed concerns about exe  
rcise : promote physical activity  
   
                                                    Last Documented On 10/21/202  
2 1:42PM ; [facility] offered active listening  
 and supportive feedback; normalized emotions and   
feelings, also provided pt time to process any current stressors. ~Promoted and   
encouraged follow-through with scheduling psychiatric services  
   
                                                    Last Documented On   
2 3:21PM ; [facility] provided supportive, empa  
thic listening and reflective feedback. ~Explored,   
encouraged, and supported the pt to discuss current sx/mood, assess risk for   
harm/need, coping mechanisms, support network and safety planning. ~Supported 
pt's   
plan to f/up with Dr. Alonso, as planned at [place], [place]. ~Encouraged 
pt to   
use safety plan, if needed to ensure she remains safe  
   
                                                    Last Documented On   
2 2:27PM ; [facility]  
   
                                                    Discussed nutritional needs   
teach healthy choices including fruits and   
vegetables  
   
                                                    Last Documented On   
2 3:20PM ; [facility]  
   
                                                    Patient education about a pr  
oper diet  
   
                                                    Last Documented On   
2 3:20PM ; [facility]  
   
                                                    Discussed concerns about exe  
rcise : promote physical activity ~ ~Will restart   
trazodone and prazosin ~ ~Patient is planning to have brother stay with her for 
a few   
days for emotional support ~ ~Follow up with PCP at next scheduled visit ~ ~Call
  
psychiatrist office to schedule appt ~ ~Call counselor  
   
                                                    Last Documented On   
2 9:35AM ; [facility]  
   
                                                    Provided supportive listenin  
g and empathic feedback; encouraged, explored, and   
supported the pt as she processed current symptoms, Issues, and concerns. 
~Discussed   
past tx and explored current needs/options. Acknowledged and validated pt's 
thoughts   
and emotions. ~Explored coping mechanisms and support network; utilized 
opportunity   
for safety planning; promoted seeking positive support and seeking help, as 
needed  
   
                                                    Last Documented On   
2 3:28PM ; [facility] provided active listenin  
g, support and helped pt process though current   
symptoms   
and stressor(s). Discussed and explored past effectiveness of medication; 
identified   
objectives and future goals; promoted use of healthy coping mechanisms, and 
self-care   
practices  
   
                                                    Last Documented On   
2 3:19PM ; [facility]  
   
                                                    Reviewed side effects and Ri  
sks/Benefits analysis  
   
                                                    Last Documented On   
2 3:19PM ; [facility]  
   
                                                    Discussed nutritional needs   
teach healthy choices including fruits and   
vegetables  
   
                                                    Last Documented On   
2 3:15PM ; [facility]  
   
                                                    Patient education about a pr  
oper diet  
   
                                                    Last Documented On   
2 3:15PM ; [facility]  
   
                                                    Discussed concerns about exe  
rcise : promote physical activity  
   
                                                    Last Documented On   
2 3:15PM ; [facility] introduced pt to [facility] in  
tegrated model of care ~[facility] offered active listening  
and   
supportive feedback; normalized emotions and feelings, also provided pt time to   
process any current stressors ~[facility] discussed potential benefits of counseling 
and   
supported re-engaging, as needed. ~[facility] encouraged pt to continue to make time to
  
implement self-care regimen and use coping methods, as needed  
   
                                                    Last Documented On   
2 4:07PM ; [facility]  
   
                                                    Discussed nutritional needs   
teach healthy choices including fruits and   
vegetables  
   
                                                    Last Documented On   
2 3:15PM ; [facility]  
   
                                                    Patient education about a pr  
oper diet  
   
                                                    Last Documented On   
2 3:15PM ; [facility]  
   
                                                    Inquiry and counseling about  
 medication administration and compliance  
   
                                                    Last Documented On   
2 7:37PM ; [facility]  
   
                                                    Discussed concerns about exe  
rcise : promote physical activity  
   
                                                    Last Documented On   
2 3:15PM ; [facility]  
   
                                                    Patient goals discussed  
   
                                                    Last Documented On   
2 7:37PM ; [facility]  
   
                                                    Ansewred pt's questions re B  
orderlline Personality D/O and Bipolar D/O raised by  
  
psychiatrist at [place]. ~Validated and normalized patient?s feelings while   
assisting to process recent events  
   
                                                    Last Documented On   
1 1:33AM ; [facility]  
   
                                                    Discussed nutritional needs   
teach healthy choices including fruits and   
vegetables  
   
                                                    Last Documented On   
1 2:04PM ; [facility]  
   
                                                    Patient education about a pr  
oper diet  
   
                                                    Last Documented On   
1 2:04PM ; [facility]  
   
                                                    Discussed concerns about exe  
rcise : promote physical activity  
   
                                                    Last Documented On   
1 2:04PM ; [facility] provided active listenin  
g, support and helped patient process through   
current   
symptoms and stressors with ongoing mental health concerns and medication 
changes.   
~[facility] discussed coping skills and supports that patient is implementing.  
discussed   
implementing coping skills as discussed with counseling and attending weekly   
appointments as scheduled with counselor. Patient was encouraged to continue 
writing   
down concerns with medications and discuss with providers. ~[facility] reminded patient
of   
crisis resources should they be needed. Patient reports having crisis resources 
and   
could return to ER  
   
                                                    Last Documented On   
1 10:17AM ; [facility]  
   
                                                    Patient education about a pr  
oper diet  
   
                                                    Last Documented On   
1 5:17PM ; [facility]  
   
                                                    Patient education about meal  
 planning  
   
                                                    Last Documented On   
1 5:17PM ; [facility]  
   
                                                    Education about changing eat  
ing habits  
   
                                                    Last Documented On   
1 5:17PM ; [facility]  
   
                                                    Patient education about high  
 fiber diet  
   
                                                    Last Documented On   
1 5:17PM ; [facility]  
   
                                                    Patient education about low   
fat diet  
   
                                                    Last Documented On   
1 5:17PM ; [facility]  
   
                                                    Patient education about low   
cholesterol diet  
   
                                                    Last Documented On   
1 5:17PM ; [facility]  
   
                                                    Patient education about low   
carbohydrate diet  
   
                                                    Last Documented On   
1 5:17PM ; [facility]  
   
                                                    Patient education about high  
 protein diet  
   
                                                    Last Documented On   
1 5:17PM ; [facility] offered active and suppo  
rtive listening, normalized emotions and feelings,   
and   
processed current stressors. ~[facility] discussed resources for finding a counselor 
and   
provided list of local resources. ~[facility] discussed patients coping skills and 
supports   
and encouraged patient to continue to implement. [facility] reminded patient of crisis
  
resources should they be needed  
   
                                                    Last Documented On   
1 7:15PM ; [facility]  
   
                                                    Discussed nutritional needs   
teach healthy choices including fruits and   
vegetables  
   
                                                    Last Documented On   
1 11:38AM ; [facility]  
   
                                                    Patient education about a pr  
oper diet  
   
                                                    Last Documented On   
1 11:38AM ; [facility]  
   
                                                    Patient education about a pr  
oper diet  
   
                                                    Last Documented On   
1 12:19PM ; [facility]  
   
                                                    Patient education about meal  
 planning  
   
                                                    Last Documented On   
1 12:19PM ; [facility]  
   
                                                    Education about changing eat  
ing habits  
   
                                                    Last Documented On   
1 12:19PM ; [facility]  
   
                                                    Patient education about high  
 fiber diet  
   
                                                    Last Documented On   
1 12:19PM ; [facility]  
   
                                                    Patient education about low   
fat diet  
   
                                                    Last Documented On  12:19PM ; [facility]  
   
                                                    Patient education about low   
cholesterol diet  
   
                                                    Last Documented On   
1 12:19PM ; [facility]  
   
                                                    Patient education about low   
carbohydrate diet  
   
                                                    Last Documented On  12:19PM ; [facility]  
   
                                                    Patient education about high  
 protein diet  
   
                                                    Last Documented On   
1 12:19PM ; [facility]  
   
                                                    Discussed concerns about exe  
rcise : promote physical activity  
   
                                                    Last Documented On   
1 11:38AM ; [facility]  
g, support and helped patient process through   
current   
symptoms and stressors related to family conflict. ~P discussed coping skills 
and   
supports with patient that can be implemented and reminded patient of ways to 
access   
additional resources. ~[facility] discussed crisis resources and plan. Patient has 
crisis   
resources still available should they be needed  
   
                                                    Last Documented On 06/10/202  
1 7:04PM ; [facility]  
   
                                                    Discussed nutritional needs   
teach healthy choices including fruits and   
vegetables  
   
                                                    Last Documented On 06/10/202  
1 3:57PM ; [facility]  
   
                                                    Patient education about a pr  
oper diet  
   
                                                    Last Documented On 06/10/202  
1 3:57PM ; [facility]  
   
                                                    Discussed concerns about exe  
rcise : promote physical activity  
   
                                                    Last Documented On 06/10/202  
1 3:57PM ; [facility] model of [facility]. [facility] and PCP reassured   
patient of not sharing information with anyone unless she has signed a release 
for us   
to do so. ~P provided active listening, support and helped patient process 
through   
current symptoms and stressors. ~[facility] discussed establishing counseling and   
psychiatry. [facility] discussed EMDR therapy and ways to find provider who does this 
type   
of therapy. ~[facility] discussed crisis resources should mood worsen, [facility] provided 
text   
hotline number for crisis. [facility] reviewed crisis plan with patient and patient is 
able   
to contact positive supports and family when feeling down  
   
                                                    Last Documented On   
1 11:46AM ; [facility]  
   
                                                    Discussed nutritional needs   
teach healthy choices including fruits and   
vegetables  
   
                                                    Last Documented On   
1 2:11PM ; [facility]  
   
                                                    Patient education about a pr  
oper diet  
   
                                                    Last Documented On   
1 2:11PM ; [facility]  
   
                                                    Discussed concerns about exe  
rcise : promote physical activity  
   
                                                    Last Documented On   
1 2:11PM ; [facility]  
Work Phone: [phone]  
  
Includes: Instructions for all patient encounters  
  
  
  
                                                    Education and Decision Aids   
were provided during visit for:  
   
                                                    ~*[facility] offered active and sup  
portive listening, normalized emotions and feelings,  
and   
processed ~current stressors. ~*Discussed engageing in counseling and looking 
into   
EMDR to address PTSD and increased nightmares  
   
                                                    Last Documented On   
4 4:14PM ; [facility]  
   
                                                    Discussed nutritional needs   
teach healthy choices including fruits and   
vegetables  
   
                                                    Last Documented On   
4 4:33PM ; [facility]  
   
                                                    Patient education about a pr  
oper diet  
   
                                                    Last Documented On   
4 4:33PM ; [facility]  
   
                                                    Discussed concerns about exe  
rcise : promote physical activity  
   
                                                    Last Documented On   
4 4:33PM ; [facility] offered active and suppo  
rtive listening and processed recent stressors. ~[facility]  
  
discussed coping skills and supports to implement in managing increased anxiety 
such   
as tools learned previously in therapy. ~[facility] discussed sleep hygiene strategies 
that   
can be implemented. ~[facility] encouraged patient to follow-up with specialists as   
scheduled  
   
                                                    Last Documented On   
4 3:26PM ; [facility]  
   
                                                    Discussed nutritional needs   
teach healthy choices including fruits and   
vegetables  
   
                                                    Last Documented On   
4 4:51PM ; [facility]  
   
                                                    Patient education about a pr  
oper diet  
   
                                                    Last Documented On   
4 4:51PM ; [facility]  
   
                                                    Discussed concerns about exe  
rcise : promote physical activity  
   
                                                    Last Documented On   
4 4:51PM ; [facility]  
   
                                                    Referred Patient to a [facility]  
es Self-Management Program  
   
                                                    Last Documented On   
4 5:22PM ; [facility] offered active and suppo  
rtive listening and processed current stressors.   
~P   
discussed coping skills and supports that can be implemented to manage increased
  
anxiety and stressors. P discussed potential of resuming counseling, even if 
for   
monthly visits to process ongoing stressors. ~[facility] encouraged patient to follow-
up on   
referrals as discussed with PCP  
   
                                                    Last Documented On   
4 10:08AM ; [facility]  
   
                                                    Discussed nutritional needs   
teach healthy choices including fruits and   
vegetables  
   
                                                    Last Documented On   
4 4:46PM ; [facility]  
   
                                                    Patient education about a pr  
oper diet  
   
                                                    Last Documented On   
4 4:46PM ; [facility]  
   
                                                    Discussed concerns about exe  
rcise : promote physical activity  
   
                                                    Last Documented On   
4 4:46PM ; [facility]  
   
                                                    Not requesting contraception  
   
                                                    Last Documented On   
4 4:46PM ; [facility] offered active and suppo  
rtive listening and processed current stressors   
related   
to getting medications. ~[facility] discussed coping skills and supports to implement 
in   
daily routine. ~[facility] discussed progress patient has felt they have made recently 
and   
encouraged continued follow-up with providers to address health  
   
                                                    Last Documented On   
4 5:16PM ; [facility]  
   
                                                    Discussed nutritional needs   
teach healthy choices including fruits and   
vegetables  
   
                                                    Last Documented On   
4 7:14PM ; [facility]  
   
                                                    Patient education about a pr  
oper diet  
   
                                                    Last Documented On   
4 7:14PM ; [facility]  
   
                                                    Discussed concerns about exe  
rcise : promote physical activity  
   
                                                    Last Documented On   
4 7:14PM ; [facility]  
   
                                                    Not requesting contraception  
   
                                                    Last Documented On   
4 7:14PM ; [facility]  
   
                                                    Discussed nutritional needs   
teach healthy choices including fruits and   
vegetables  
   
                                                    Last Documented On   
3 5:15PM ; [facility]  
   
                                                    Patient education about a pr  
oper diet  
   
                                                    Last Documented On   
3 5:15PM ; [facility]  
   
                                                    Discussed concerns about exe  
rcise : promote physical activity  
   
                                                    Last Documented On   
3 5:15PM ; [facility]  
   
                                                    Discussed nutritional needs   
teach healthy choices including fruits and   
vegetables  
   
                                                    Last Documented On 10/21/202  
2 1:42PM ; [facility]  
   
                                                    Patient education about a pr  
oper diet  
   
                                                    Last Documented On 10/21/202  
2 1:42PM ; [facility]  
   
                                                    Discussed concerns about exe  
rcise : promote physical activity  
   
                                                    Last Documented On 10/21/202  
2 1:42PM ; [facility] offered active listening  
 and supportive feedback; normalized emotions and   
feelings, also provided pt time to process any current stressors. ~Promoted and   
encouraged follow-through with scheduling psychiatric services  
   
                                                    Last Documented On   
2 3:21PM ; [facility] provided supportive, empa  
thic listening and reflective feedback. ~Explored,   
encouraged, and supported the pt to discuss current sx/mood, assess risk for   
harm/need, coping mechanisms, support network and safety planning. ~Supported 
pt's   
plan to f/up with Dr. Alonso, as planned at [place], [place]. ~Encouraged 
pt to   
use safety plan, if needed to ensure she remains safe  
   
                                                    Last Documented On   
2 2:27PM ; [facility]  
   
                                                    Discussed nutritional needs   
teach healthy choices including fruits and   
vegetables  
   
                                                    Last Documented On   
2 3:20PM ; [facility]  
   
                                                    Patient education about a pr  
oper diet  
   
                                                    Last Documented On   
2 3:20PM ; [facility]  
   
                                                    Discussed concerns about exe  
rcise : promote physical activity ~ ~Will restart   
trazodone and prazosin ~ ~Patient is planning to have brother stay with her for 
a few   
days for emotional support ~ ~Follow up with PCP at next scheduled visit ~ ~Call
  
psychiatrist office to schedule appt ~ ~Call counselor  
   
                                                    Last Documented On   
2 9:35AM ; [facility]  
   
                                                    Provided supportive listenin  
g and empathic feedback; encouraged, explored, and   
supported the pt as she processed current symptoms, Issues, and concerns. 
~Discussed   
past tx and explored current needs/options. Acknowledged and validated pt's 
thoughts   
and emotions. ~Explored coping mechanisms and support network; utilized 
opportunity   
for safety planning; promoted seeking positive support and seeking help, as 
needed  
   
                                                    Last Documented On   
2 3:28PM ; [facility] provided active listenin  
g, support and helped pt process though current   
symptoms   
and stressor(s). Discussed and explored past effectiveness of medication; 
identified   
objectives and future goals; promoted use of healthy coping mechanisms, and 
self-care   
practices  
   
                                                    Last Documented On   
2 3:19PM ; [facility]  
   
                                                    Reviewed side effects and Ri  
sks/Benefits analysis  
   
                                                    Last Documented On   
2 3:19PM ; [facility]  
   
                                                    Discussed nutritional needs   
teach healthy choices including fruits and   
vegetables  
   
                                                    Last Documented On   
2 3:15PM ; [facility]  
   
                                                    Patient education about a pr  
oper diet  
   
                                                    Last Documented On   
2 3:15PM ; [facility]  
   
                                                    Discussed concerns about exe  
rcise : promote physical activity  
   
                                                    Last Documented On   
2 3:15PM ; [facility] introduced pt to [facility] in  
tegrated model of care ~[facility] offered active listening  
and   
supportive feedback; normalized emotions and feelings, also provided pt time to   
process any current stressors ~[facility] discussed potential benefits of counseling 
and   
supported re-engaging, as needed. ~[facility] encouraged pt to continue to make time to
  
implement self-care regimen and use coping methods, as needed  
   
                                                    Last Documented On   
2 4:07PM ; [facility]  
   
                                                    Discussed nutritional needs   
teach healthy choices including fruits and   
vegetables  
   
                                                    Last Documented On   
2 3:15PM ; [facility]  
   
                                                    Patient education about a pr  
oper diet  
   
                                                    Last Documented On   
2 3:15PM ; [facility]  
   
                                                    Inquiry and counseling about  
 medication administration and compliance  
   
                                                    Last Documented On   
2 7:37PM ; [facility]  
   
                                                    Discussed concerns about exe  
rcise : promote physical activity  
   
                                                    Last Documented On   
2 3:15PM ; [facility]  
   
                                                    Patient goals discussed  
   
                                                    Last Documented On   
2 7:37PM ; [facility]  
   
                                                    Ansewred pt's questions re B  
orderlline Personality D/O and Bipolar D/O raised by  
  
psychiatrist at [place]. ~Validated and normalized patient?s feelings while   
assisting to process recent events  
   
                                                    Last Documented On   
1 1:33AM ; [facility]  
   
                                                    Discussed nutritional needs   
teach healthy choices including fruits and   
vegetables  
   
                                                    Last Documented On   
1 2:04PM ; [facility]  
   
                                                    Patient education about a pr  
oper diet  
   
                                                    Last Documented On   
1 2:04PM ; [facility]  
   
                                                    Discussed concerns about exe  
rcise : promote physical activity  
   
                                                    Last Documented On   
1 2:04PM ; [facility] provided active listenin  
g, support and helped patient process through   
current   
symptoms and stressors with ongoing mental health concerns and medication 
changes.   
~[facility] discussed coping skills and supports that patient is implementing.  
discussed   
implementing coping skills as discussed with counseling and attending weekly   
appointments as scheduled with counselor. Patient was encouraged to continue 
writing   
down concerns with medications and discuss with providers. ~[facility] reminded patient
of   
crisis resources should they be needed. Patient reports having crisis resources 
and   
could return to ER  
   
                                                    Last Documented On   
1 10:17AM ; [facility]  
   
                                                    Patient education about a pr  
oper diet  
   
                                                    Last Documented On   
1 5:17PM ; [facility]  
   
                                                    Patient education about meal  
 planning  
   
                                                    Last Documented On   
1 5:17PM ; [facility]  
   
                                                    Education about changing eat  
ing habits  
   
                                                    Last Documented On   
1 5:17PM ; [facility]  
   
                                                    Patient education about high  
 fiber diet  
   
                                                    Last Documented On   
1 5:17PM ; [facility]  
   
                                                    Patient education about low   
fat diet  
   
                                                    Last Documented On   
1 5:17PM ; [facility]  
   
                                                    Patient education about low   
cholesterol diet  
   
                                                    Last Documented On   
1 5:17PM ; [facility]  
   
                                                    Patient education about low   
carbohydrate diet  
   
                                                    Last Documented On   
1 5:17PM ; [facility]  
   
                                                    Patient education about high  
 protein diet  
   
                                                    Last Documented On   
1 5:17PM ; [facility] offered active and suppo  
rtive listening, normalized emotions and feelings,   
and   
processed current stressors. ~[facility] discussed resources for finding a counselor 
and   
provided list of local resources. [facility] discussed patients coping skills and 
supports   
and encouraged patient to continue to implement. [facility] reminded patient of crisis
  
resources should they be needed  
   
                                                    Last Documented On   
1 7:15PM ; [facility]  
   
                                                    Discussed nutritional needs   
teach healthy choices including fruits and   
vegetables  
   
                                                    Last Documented On   
1 11:38AM ; [facility]  
   
                                                    Patient education about a pr  
oper diet  
   
                                                    Last Documented On   
1 11:38AM ; [facility]  
   
                                                    Patient education about a pr  
oper diet  
   
                                                    Last Documented On   
1 12:19PM ; [facility]  
   
                                                    Patient education about meal  
 planning  
   
                                                    Last Documented On   
1 12:19PM ; [facility]  
   
                                                    Education about changing eat  
ing habits  
   
                                                    Last Documented On   
1 12:19PM ; [facility]  
   
                                                    Patient education about high  
 fiber diet  
   
                                                    Last Documented On   
1 12:19PM ; [facility]  
   
                                                    Patient education about low   
fat diet  
   
                                                    Last Documented On   
1 12:19PM ; [facility]  
   
                                                    Patient education about low   
cholesterol diet  
   
                                                    Last Documented On   
1 12:19PM ; [facility]  
   
                                                    Patient education about low   
carbohydrate diet  
   
                                                    Last Documented On   
1 12:19PM ; [facility]  
   
                                                    Patient education about high  
 protein diet  
   
                                                    Last Documented On  12:19PM ; [facility]  
   
                                                    Discussed concerns about exe  
rcise : promote physical activity  
   
                                                    Last Documented On   
1 11:38AM ; [facility]  
g, support and helped patient process through   
current   
symptoms and stressors related to family conflict. ~P discussed coping skills 
and   
supports with patient that can be implemented and reminded patient of ways to 
access   
additional resources. ~P discussed crisis resources and plan. Patient has 
crisis   
resources still available should they be needed  
   
                                                    Last Documented On 06/10/202  
1 7:04PM ; [facility]  
   
                                                    Discussed nutritional needs   
teach healthy choices including fruits and   
vegetables  
   
                                                    Last Documented On 06/10/202  
1 3:57PM ; [facility]  
   
                                                    Patient education about a pr  
oper diet  
   
                                                    Last Documented On 06/10/202  
1 3:57PM ; [facility]  
   
                                                    Discussed concerns about exe  
rcise : promote physical activity  
   
                                                    Last Documented On 06/10/202  
1 3:57PM ; [facility] model of [facility]. [facility] and PCP reassured   
patient of not sharing information with anyone unless she has signed a release 
for us   
to do so. ~P provided active listening, support and helped patient process 
through   
current symptoms and stressors. ~P discussed establishing counseling and   
psychiatry. [facility] discussed EMDR therapy and ways to find provider who does this 
type   
of therapy. ~[facility] discussed crisis resources should mood worsen, [facility] provided 
text   
hotline number for crisis. [facility] reviewed crisis plan with patient and patient is 
able   
to contact positive supports and family when feeling down  
   
                                                    Last Documented On   
1 11:46AM ; [facility]  
   
                                                    Discussed nutritional needs   
teach healthy choices including fruits and   
vegetables  
   
                                                    Last Documented On   
1 2:11PM ; [facility]  
   
                                                    Patient education about a pr  
oper diet  
   
                                                    Last Documented On   
1 2:11PM ; [facility]  
   
                                                    Discussed concerns about exe  
rcise : promote physical activity  
   
                                                    Last Documented On   
1 2:11PM ; [facility]  
Work Phone: [phone]  
  
Includes: Instructions for all patient encounters  
  
  
  
                                                    Education and Decision Aids   
were provided during visit for:  
   
                                                    ~*[facility] offered active and sup  
portive listening, normalized emotions and feelings,  
and   
processed ~current stressors. ~*Discussed engageing in counseling and looking 
into   
EMDR to address PTSD and increased nightmares  
   
                                                    Last Documented On   
4 4:14PM ; [facility]  
   
                                                    Discussed nutritional needs   
teach healthy choices including fruits and   
vegetables  
   
                                                    Last Documented On   
4 4:33PM ; [facility]  
   
                                                    Patient education about a pr  
oper diet  
   
                                                    Last Documented On   
4 4:33PM ; [facility]  
   
                                                    Discussed concerns about exe  
rcise : promote physical activity  
   
                                                    Last Documented On   
4 4:33PM ; [facility] offered active and suppo  
rtive listening and processed recent stressors. ~[facility]  
  
discussed coping skills and supports to implement in managing increased anxiety 
such   
as tools learned previously in therapy. ~[facility] discussed sleep hygiene strategies 
that   
can be implemented. ~[facility] encouraged patient to follow-up with specialists as   
scheduled  
   
                                                    Last Documented On   
4 3:26PM ; [facility]  
   
                                                    Discussed nutritional needs   
teach healthy choices including fruits and   
vegetables  
   
                                                    Last Documented On   
4 4:51PM ; [facility]  
   
                                                    Patient education about a pr  
oper diet  
   
                                                    Last Documented On   
4 4:51PM ; [facility]  
   
                                                    Discussed concerns about exe  
rcise : promote physical activity  
   
                                                    Last Documented On   
4 4:51PM ; [facility]  
   
                                                    Referred Patient to a [facility]  
es Self-Management Program  
   
                                                    Last Documented On   
4 5:22PM ; [facility] offered active and suppo  
rtive listening and processed current stressors.   
~P   
discussed coping skills and supports that can be implemented to manage increased
  
anxiety and stressors. [facility] discussed potential of resuming counseling, even if 
for   
monthly visits to process ongoing stressors. ~[facility] encouraged patient to follow-
up on   
referrals as discussed with PCP  
   
                                                    Last Documented On   
4 10:08AM ; [facility]  
   
                                                    Discussed nutritional needs   
teach healthy choices including fruits and   
vegetables  
   
                                                    Last Documented On   
4 4:46PM ; [facility]  
   
                                                    Patient education about a pr  
oper diet  
   
                                                    Last Documented On   
4 4:46PM ; [facility]  
   
                                                    Discussed concerns about exe  
rcise : promote physical activity  
   
                                                    Last Documented On   
4 4:46PM ; [facility]  
   
                                                    Not requesting contraception  
   
                                                    Last Documented On   
4 4:46PM ; [facility] offered active and suppo  
rtive listening and processed current stressors   
related   
to getting medications. ~P discussed coping skills and supports to implement 
in   
daily routine. ~[facility] discussed progress patient has felt they have made recently 
and   
encouraged continued follow-up with providers to address health  
   
                                                    Last Documented On   
4 5:16PM ; [facility]  
   
                                                    Discussed nutritional needs   
teach healthy choices including fruits and   
vegetables  
   
                                                    Last Documented On   
4 7:14PM ; [facility]  
   
                                                    Patient education about a pr  
oper diet  
   
                                                    Last Documented On   
4 7:14PM ; [facility]  
   
                                                    Discussed concerns about exe  
rcise : promote physical activity  
   
                                                    Last Documented On   
4 7:14PM ; [facility]  
   
                                                    Not requesting contraception  
   
                                                    Last Documented On   
4 7:14PM ; [facility]  
   
                                                    Discussed nutritional needs   
teach healthy choices including fruits and   
vegetables  
   
                                                    Last Documented On   
3 5:15PM ; [facility]  
   
                                                    Patient education about a pr  
oper diet  
   
                                                    Last Documented On   
3 5:15PM ; [facility]  
   
                                                    Discussed concerns about exe  
rcise : promote physical activity  
   
                                                    Last Documented On   
3 5:15PM ; [facility]  
   
                                                    Discussed nutritional needs   
teach healthy choices including fruits and   
vegetables  
   
                                                    Last Documented On 10/21/202  
2 1:42PM ; [facility]  
   
                                                    Patient education about a pr  
oper diet  
   
                                                    Last Documented On 10/21/202  
2 1:42PM ; [facility]  
   
                                                    Discussed concerns about exe  
rcise : promote physical activity  
   
                                                    Last Documented On 10/21/202  
2 1:42PM ; [facility] offered active listening  
 and supportive feedback; normalized emotions and   
feelings, also provided pt time to process any current stressors. ~Promoted and   
encouraged follow-through with scheduling psychiatric services  
   
                                                    Last Documented On   
2 3:21PM ; [facility] provided supportive, empa  
thic listening and reflective feedback. ~Explored,   
encouraged, and supported the pt to discuss current sx/mood, assess risk for   
harm/need, coping mechanisms, support network and safety planning. ~Supported 
pt's   
plan to f/up with Dr. Alonso, as planned at [place], [place]. ~Encouraged 
pt to   
use safety plan, if needed to ensure she remains safe  
   
                                                    Last Documented On   
2 2:27PM ; [facility]  
   
                                                    Discussed nutritional needs   
teach healthy choices including fruits and   
vegetables  
   
                                                    Last Documented On   
2 3:20PM ; [facility]  
   
                                                    Patient education about a pr  
oper diet  
   
                                                    Last Documented On   
2 3:20PM ; [facility]  
   
                                                    Discussed concerns about exe  
rcise : promote physical activity ~ ~Will restart   
trazodone and prazosin ~ ~Patient is planning to have brother stay with her for 
a few   
days for emotional support ~ ~Follow up with PCP at next scheduled visit ~ ~Call
  
psychiatrist office to schedule appt ~ ~Call counselor  
   
                                                    Last Documented On   
2 9:35AM ; [facility]  
   
                                                    Provided supportive listenin  
g and empathic feedback; encouraged, explored, and   
supported the pt as she processed current symptoms, Issues, and concerns. 
~Discussed   
past tx and explored current needs/options. Acknowledged and validated pt's 
thoughts   
and emotions. ~Explored coping mechanisms and support network; utilized 
opportunity   
for safety planning; promoted seeking positive support and seeking help, as 
needed  
   
                                                    Last Documented On   
2 3:28PM ; [facility] provided active listenin  
g, support and helped pt process though current   
symptoms   
and stressor(s). Discussed and explored past effectiveness of medication; 
identified   
objectives and future goals; promoted use of healthy coping mechanisms, and 
self-care   
practices  
   
                                                    Last Documented On   
2 3:19PM ; [facility]  
   
                                                    Reviewed side effects and Ri  
sks/Benefits analysis  
   
                                                    Last Documented On   
2 3:19PM ; [facility]  
   
                                                    Discussed nutritional needs   
teach healthy choices including fruits and   
vegetables  
   
                                                    Last Documented On   
2 3:15PM ; [facility]  
   
                                                    Patient education about a pr  
oper diet  
   
                                                    Last Documented On   
2 3:15PM ; [facility]  
   
                                                    Discussed concerns about exe  
rcise : promote physical activity  
   
                                                    Last Documented On   
2 3:15PM ; [facility] introduced pt to [facility] in  
tegrated model of care ~P offered active listening  
and   
supportive feedback; normalized emotions and feelings, also provided pt time to   
process any current stressors ~P discussed potential benefits of counseling 
and   
supported re-engaging, as needed. ~P encouraged pt to continue to make time to
  
implement self-care regimen and use coping methods, as needed  
   
                                                    Last Documented On   
2 4:07PM ; [facility]  
   
                                                    Discussed nutritional needs   
teach healthy choices including fruits and   
vegetables  
   
                                                    Last Documented On   
2 3:15PM ; [facility]  
   
                                                    Patient education about a pr  
oper diet  
   
                                                    Last Documented On   
2 3:15PM ; [facility]  
   
                                                    Inquiry and counseling about  
 medication administration and compliance  
   
                                                    Last Documented On   
2 7:37PM ; [facility]  
   
                                                    Discussed concerns about exe  
rcise : promote physical activity  
   
                                                    Last Documented On   
2 3:15PM ; [facility]  
   
                                                    Patient goals discussed  
   
                                                    Last Documented On   
2 7:37PM ; [facility]  
   
                                                    Ansewred pt's questions re B  
orderlline Personality D/O and Bipolar D/O raised by  
  
psychiatrist at [place]. ~Validated and normalized patient?s feelings while   
assisting to process recent events  
   
                                                    Last Documented On   
1 1:33AM ; [facility]  
   
                                                    Discussed nutritional needs   
teach healthy choices including fruits and   
vegetables  
   
                                                    Last Documented On   
1 2:04PM ; [facility]  
   
                                                    Patient education about a pr  
oper diet  
   
                                                    Last Documented On   
1 2:04PM ; [facility]  
   
                                                    Discussed concerns about exe  
rcise : promote physical activity  
   
                                                    Last Documented On   
1 2:04PM ; [facility] provided active listenin  
g, support and helped patient process through   
current   
symptoms and stressors with ongoing mental health concerns and medication 
changes.   
[facility] discussed coping skills and supports that patient is implementing.  
discussed   
implementing coping skills as discussed with counseling and attending weekly   
appointments as scheduled with counselor. Patient was encouraged to continue 
writing   
down concerns with medications and discuss with providers. [facility] reminded patient
of   
crisis resources should they be needed. Patient reports having crisis resources 
and   
could return to ER  
   
                                                    Last Documented On   
1 10:17AM ; [facility]  
   
                                                    Patient education about a pr  
oper diet  
   
                                                    Last Documented On   
1 5:17PM ; [facility]  
   
                                                    Patient education about meal  
 planning  
   
                                                    Last Documented On   
1 5:17PM ; [facility]  
   
                                                    Education about changing eat  
ing habits  
   
                                                    Last Documented On   
1 5:17PM ; [facility]  
   
                                                    Patient education about high  
 fiber diet  
   
                                                    Last Documented On   
1 5:17PM ; [facility]  
   
                                                    Patient education about low   
fat diet  
   
                                                    Last Documented On   
1 5:17PM ; [facility]  
   
                                                    Patient education about low   
cholesterol diet  
   
                                                    Last Documented On   
1 5:17PM ; [facility]  
   
                                                    Patient education about low   
carbohydrate diet  
   
                                                    Last Documented On   
1 5:17PM ; [facility]  
   
                                                    Patient education about high  
 protein diet  
   
                                                    Last Documented On   
1 5:17PM ; [facility] offered active and suppo  
rtive listening, normalized emotions and feelings,   
and   
processed current stressors. [facility] discussed resources for finding a counselor 
and   
provided list of local resources. [facility] discussed patients coping skills and 
supports   
and encouraged patient to continue to implement. [facility] reminded patient of crisis
  
resources should they be needed  
   
                                                    Last Documented On   
1 7:15PM ; [facility]  
   
                                                    Discussed nutritional needs   
teach healthy choices including fruits and   
vegetables  
   
                                                    Last Documented On   
1 11:38AM ; [facility]  
   
                                                    Patient education about a pr  
oper diet  
   
                                                    Last Documented On   
1 11:38AM ; [facility]  
   
                                                    Patient education about a pr  
oper diet  
   
                                                    Last Documented On   
1 12:19PM ; [facility]  
   
                                                    Patient education about meal  
 planning  
   
                                                    Last Documented On   
1 12:19PM ; [facility]  
   
                                                    Education about changing eat  
ing habits  
   
                                                    Last Documented On   
1 12:19PM ; [facility]  
   
                                                    Patient education about high  
 fiber diet  
   
                                                    Last Documented On   
1 12:19PM ; [facility]  
   
                                                    Patient education about low   
fat diet  
   
                                                    Last Documented On   
1 12:19PM ; [facility]  
   
                                                    Patient education about low   
cholesterol diet  
   
                                                    Last Documented On   
1 12:19PM ; [facility]  
   
                                                    Patient education about low   
carbohydrate diet  
   
                                                    Last Documented On   
1 12:19PM ; [facility]  
   
                                                    Patient education about high  
 protein diet  
   
                                                    Last Documented On   
1 12:19PM ; [facility]  
   
                                                    Discussed concerns about exe  
rcise : promote physical activity  
   
                                                    Last Documented On   
1 11:38AM ; [facility]  
g, support and helped patient process through   
current   
symptoms and stressors related to family conflict. ~P discussed coping skills 
and   
supports with patient that can be implemented and reminded patient of ways to 
access   
additional resources. ~P discussed crisis resources and plan. Patient has 
crisis   
resources still available should they be needed  
   
                                                    Last Documented On 06/10/202  
1 7:04PM ; [facility]  
   
                                                    Discussed nutritional needs   
teach healthy choices including fruits and   
vegetables  
   
                                                    Last Documented On 06/10/202  
1 3:57PM ; [facility]  
   
                                                    Patient education about a pr  
oper diet  
   
                                                    Last Documented On 06/10/202  
1 3:57PM ; [facility]  
   
                                                    Discussed concerns about exe  
rcise : promote physical activity  
   
                                                    Last Documented On 06/10/202  
1 3:57PM [facility]. [facility] and PCP reassured   
patient of not sharing information with anyone unless she has signed a release 
for us   
to do so. ~P provided active listening, support and helped patient process 
through   
current symptoms and stressors. ~P discussed establishing counseling and   
psychiatry. [facility] discussed EMDR therapy and ways to find provider who does this 
type   
of therapy. ~P discussed crisis resources should mood worsen, [facility] provided 
text   
hotline number for crisis. [facility] reviewed crisis plan with patient and patient is 
able   
to contact positive supports and family when feeling down  
   
                                                    Last Documented On   
1 11:46AM ; [facility]  
   
                                                    Discussed nutritional needs   
teach healthy choices including fruits and   
vegetables  
   
                                                    Last Documented On   
1 2:11PM ; [facility]  
   
                                                    Patient education about a pr  
oper diet  
   
                                                    Last Documented On   
1 2:11PM ; [facility]  
   
                                                    Discussed concerns about exe  
rcise : promote physical activity  
   
                                                    Last Documented On   
1 2:11PM ; [facility]  
Work Phone: [phone]  
  
Includes: Instructions for all patient encounters  
  
  
  
                                                    Education and Decision Aids   
were provided during visit for:  
   
                                                    ~*[facility] offered active and sup  
portive listening, normalized emotions and feelings,  
and   
processed ~current stressors. ~*Discussed engageing in counseling and looking 
into   
EMDR to address PTSD and increased nightmares  
   
                                                    Last Documented On   
4 4:14PM ; [facility]  
   
                                                    Discussed nutritional needs   
teach healthy choices including fruits and   
vegetables  
   
                                                    Last Documented On   
4 4:33PM ; [facility]  
   
                                                    Patient education about a pr  
oper diet  
   
                                                    Last Documented On   
4 4:33PM ; [facility]  
   
                                                    Discussed concerns about exe  
rcise : promote physical activity  
   
                                                    Last Documented On   
4 4:33PM ; [facility] offered active and suppo  
rtive listening and processed recent stressors. ~[facility]  
  
discussed coping skills and supports to implement in managing increased anxiety 
such   
as tools learned previously in therapy. ~[facility] discussed sleep hygiene strategies 
that   
can be implemented. ~[facility] encouraged patient to follow-up with specialists as   
scheduled  
   
                                                    Last Documented On   
4 3:26PM ; [facility]  
   
                                                    Discussed nutritional needs   
teach healthy choices including fruits and   
vegetables  
   
                                                    Last Documented On   
4 4:51PM ; [facility]  
   
                                                    Patient education about a pr  
oper diet  
   
                                                    Last Documented On   
4 4:51PM ; [facility]  
   
                                                    Discussed concerns about exe  
rcise : promote physical activity  
   
                                                    Last Documented On   
4 4:51PM ; [facility]  
   
                                                    Referred Patient to a [facility]  
es Self-Management Program  
   
                                                    Last Documented On   
4 5:22PM ; [facility] offered active and suppo  
rtive listening and processed current stressors.   
~[facility]   
discussed coping skills and supports that can be implemented to manage increased
  
anxiety and stressors. [facility] discussed potential of resuming counseling, even if 
for   
monthly visits to process ongoing stressors. [facility] encouraged patient to follow-
up on   
referrals as discussed with PCP  
   
                                                    Last Documented On   
4 10:08AM ; [facility]  
   
                                                    Discussed nutritional needs   
teach healthy choices including fruits and   
vegetables  
   
                                                    Last Documented On   
4 4:46PM ; [facility]  
   
                                                    Patient education about a pr  
oper diet  
   
                                                    Last Documented On   
4 4:46PM ; [facility]  
   
                                                    Discussed concerns about exe  
rcise : promote physical activity  
   
                                                    Last Documented On   
4 4:46PM ; [facility]  
   
                                                    Not requesting contraception  
   
                                                    Last Documented On   
4 4:46PM ; [facility] offered active and suppo  
rtive listening and processed current stressors   
related   
to getting medications. ~[facility] discussed coping skills and supports to implement 
in   
daily routine. [facility] discussed progress patient has felt they have made recently 
and   
encouraged continued follow-up with providers to address health  
   
                                                    Last Documented On   
4 5:16PM ; [facility]  
   
                                                    Discussed nutritional needs   
teach healthy choices including fruits and   
vegetables  
   
                                                    Last Documented On   
4 7:14PM ; [facility]  
   
                                                    Patient education about a pr  
oper diet  
   
                                                    Last Documented On   
4 7:14PM ; [facility]  
   
                                                    Discussed concerns about exe  
rcise : promote physical activity  
   
                                                    Last Documented On   
4 7:14PM ; [facility]  
   
                                                    Not requesting contraception  
   
                                                    Last Documented On   
4 7:14PM ; [facility]  
   
                                                    Discussed nutritional needs   
teach healthy choices including fruits and   
vegetables  
   
                                                    Last Documented On   
3 5:15PM ; [facility]  
   
                                                    Patient education about a pr  
oper diet  
   
                                                    Last Documented On   
3 5:15PM ; [facility]  
   
                                                    Discussed concerns about exe  
rcise : promote physical activity  
   
                                                    Last Documented On   
3 5:15PM ; [facility]  
   
                                                    Discussed nutritional needs   
teach healthy choices including fruits and   
vegetables  
   
                                                    Last Documented On 10/21/202  
2 1:42PM ; [facility]  
   
                                                    Patient education about a pr  
oper diet  
   
                                                    Last Documented On 10/21/202  
2 1:42PM ; [facility]  
   
                                                    Discussed concerns about exe  
rcise : promote physical activity  
   
                                                    Last Documented On 10/21/202  
2 1:42PM ; [facility] offered active listening  
 and supportive feedback; normalized emotions and   
feelings, also provided pt time to process any current stressors. ~Promoted and   
encouraged follow-through with scheduling psychiatric services  
   
                                                    Last Documented On   
2 3:21PM ; [facility] provided supportive, empa  
thic listening and reflective feedback. ~Explored,   
encouraged, and supported the pt to discuss current sx/mood, assess risk for   
harm/need, coping mechanisms, support network and safety planning. ~Supported 
pt's   
plan to f/up with Dr. Alonso, as planned at [place], [place]. ~Encouraged 
pt to   
use safety plan, if needed to ensure she remains safe  
   
                                                    Last Documented On   
2 2:27PM ; [facility]  
   
                                                    Discussed nutritional needs   
teach healthy choices including fruits and   
vegetables  
   
                                                    Last Documented On   
2 3:20PM ; [facility]  
   
                                                    Patient education about a pr  
oper diet  
   
                                                    Last Documented On   
2 3:20PM ; [facility]  
   
                                                    Discussed concerns about exe  
rcise : promote physical activity ~ ~Will restart   
trazodone and prazosin ~ ~Patient is planning to have brother stay with her for 
a few   
days for emotional support ~ ~Follow up with PCP at next scheduled visit ~ ~Call
  
psychiatrist office to schedule appt ~ ~Call counselor  
   
                                                    Last Documented On   
2 9:35AM ; [facility]  
   
                                                    Provided supportive listenin  
g and empathic feedback; encouraged, explored, and   
supported the pt as she processed current symptoms, Issues, and concerns. 
~Discussed   
past tx and explored current needs/options. Acknowledged and validated pt's 
thoughts   
and emotions. ~Explored coping mechanisms and support network; utilized 
opportunity   
for safety planning; promoted seeking positive support and seeking help, as 
needed  
   
                                                    Last Documented On   
2 3:28PM ; [facility] provided active listenin  
g, support and helped pt process though current   
symptoms   
and stressor(s). Discussed and explored past effectiveness of medication; 
identified   
objectives and future goals; promoted use of healthy coping mechanisms, and 
self-care   
practices  
   
                                                    Last Documented On   
2 3:19PM ; [facility]  
   
                                                    Reviewed side effects and Ri  
sks/Benefits analysis  
   
                                                    Last Documented On   
2 3:19PM ; [facility]  
   
                                                    Discussed nutritional needs   
teach healthy choices including fruits and   
vegetables  
   
                                                    Last Documented On   
2 3:15PM ; [facility]  
   
                                                    Patient education about a pr  
oper diet  
   
                                                    Last Documented On   
2 3:15PM ; [facility]  
   
                                                    Discussed concerns about exe  
rcise : promote physical activity  
   
                                                    Last Documented On   
2 3:15PM ; [facility] introduced pt to [facility] in  
tegrated model of care ~P offered active listening  
and   
supportive feedback; normalized emotions and feelings, also provided pt time to   
process any current stressors ~P discussed potential benefits of counseling 
and   
supported re-engaging, as needed. ~P encouraged pt to continue to make time to
  
implement self-care regimen and use coping methods, as needed  
   
                                                    Last Documented On   
2 4:07PM ; [facility]  
   
                                                    Discussed nutritional needs   
teach healthy choices including fruits and   
vegetables  
   
                                                    Last Documented On   
2 3:15PM ; [facility]  
   
                                                    Patient education about a pr  
oper diet  
   
                                                    Last Documented On   
2 3:15PM ; [facility]  
   
                                                    Inquiry and counseling about  
 medication administration and compliance  
   
                                                    Last Documented On   
2 7:37PM ; [facility]  
   
                                                    Discussed concerns about exe  
rcise : promote physical activity  
   
                                                    Last Documented On   
2 3:15PM ; [facility]  
   
                                                    Patient goals discussed  
   
                                                    Last Documented On   
2 7:37PM ; [facility]  
   
                                                    Ansewred pt's questions re B  
orderlline Personality D/O and Bipolar D/O raised by  
  
psychiatrist at [place]. ~Validated and normalized patient?s feelings while   
assisting to process recent events  
   
                                                    Last Documented On   
1 1:33AM ; [facility]  
   
                                                    Discussed nutritional needs   
teach healthy choices including fruits and   
vegetables  
   
                                                    Last Documented On   
1 2:04PM ; [facility]  
   
                                                    Patient education about a pr  
oper diet  
   
                                                    Last Documented On   
1 2:04PM ; [facility]  
   
                                                    Discussed concerns about exe  
rcise : promote physical activity  
   
                                                    Last Documented On   
1 2:04PM ; [facility] provided active listenin  
g, support and helped patient process through   
current   
symptoms and stressors with ongoing mental health concerns and medication 
changes.   
[facility] discussed coping skills and supports that patient is implementing.  
discussed   
implementing coping skills as discussed with counseling and attending weekly   
appointments as scheduled with counselor. Patient was encouraged to continue 
writing   
down concerns with medications and discuss with providers. [facility] reminded patient
of   
crisis resources should they be needed. Patient reports having crisis resources 
and   
could return to ER  
   
                                                    Last Documented On   
1 10:17AM ; [facility]  
   
                                                    Patient education about a pr  
oper diet  
   
                                                    Last Documented On   
1 5:17PM ; [facility]  
   
                                                    Patient education about meal  
 planning  
   
                                                    Last Documented On  5:17PM ; [facility]  
   
                                                    Education about changing eat  
ing habits  
   
                                                    Last Documented On  5:17PM ; [facility]  
   
                                                    Patient education about high  
 fiber diet  
   
                                                    Last Documented On  5:17PM ; [facility]  
   
                                                    Patient education about low   
fat diet  
   
                                                    Last Documented On   
1 5:17PM ; [facility]  
   
                                                    Patient education about low   
cholesterol diet  
   
                                                    Last Documented On   
1 5:17PM ; [facility]  
   
                                                    Patient education about low   
carbohydrate diet  
   
                                                    Last Documented On   
1 5:17PM ; [facility]  
   
                                                    Patient education about high  
 protein diet  
   
                                                    Last Documented On   
1 5:17PM ; [facility] offered active and suppo  
rtive listening, normalized emotions and feelings,   
and   
processed current stressors. [facility] discussed resources for finding a counselor 
and   
provided list of local resources. [facility] discussed patients coping skills and 
supports   
and encouraged patient to continue to implement. [facility] reminded patient of crisis
  
resources should they be needed  
   
                                                    Last Documented On   
1 7:15PM ; [facility]  
   
                                                    Discussed nutritional needs   
teach healthy choices including fruits and   
vegetables  
   
                                                    Last Documented On   
1 11:38AM ; [facility]  
   
                                                    Patient education about a pr  
oper diet  
   
                                                    Last Documented On   
1 11:38AM ; [facility]  
   
                                                    Patient education about a pr  
oper diet  
   
                                                    Last Documented On   
1 12:19PM ; [facility]  
   
                                                    Patient education about meal  
 planning  
   
                                                    Last Documented On   
1 12:19PM ; [facility]  
   
                                                    Education about changing eat  
ing habits  
   
                                                    Last Documented On   
1 12:19PM ; [facility]  
   
                                                    Patient education about high  
 fiber diet  
   
                                                    Last Documented On   
1 12:19PM ; [facility]  
   
                                                    Patient education about low   
fat diet  
   
                                                    Last Documented On   
1 12:19PM ; [facility]  
   
                                                    Patient education about low   
cholesterol diet  
   
                                                    Last Documented On   
1 12:19PM ; [facility]  
   
                                                    Patient education about low   
carbohydrate diet  
   
                                                    Last Documented On   
1 12:19PM ; [facility]  
   
                                                    Patient education about high  
 protein diet  
   
                                                    Last Documented On   
1 12:19PM ; [facility]  
   
                                                    Discussed concerns about exe  
rcise : promote physical activity  
   
                                                    Last Documented On   
1 11:38AM ; [facility]  
g, support and helped patient process through   
current   
symptoms and stressors related to family conflict. ~P discussed coping skills 
and   
supports with patient that can be implemented and reminded patient of ways to 
access   
additional resources. ~P discussed crisis resources and plan. Patient has 
crisis   
resources still available should they be needed  
   
                                                    Last Documented On 06/10/202  
1 7:04PM ; [facility]  
   
                                                    Discussed nutritional needs   
teach healthy choices including fruits and   
vegetables  
   
                                                    Last Documented On 06/10/202  
1 3:57PM ; [facility]  
   
                                                    Patient education about a pr  
oper diet  
   
                                                    Last Documented On 06/10/202  
1 3:57PM ; [facility]  
   
                                                    Discussed concerns about exe  
rcise : promote physical activity  
   
                                                    Last Documented On 06/10/202  
1 3:57PM ; [facility]. [facility] and PCP reassured   
patient of not sharing information with anyone unless she has signed a release 
for us   
to do so. ~P provided active listening, support and helped patient process 
through   
current symptoms and stressors. ~P discussed establishing counseling and   
psychiatry. [facility] discussed EMDR therapy and ways to find provider who does this 
type   
of therapy. ~P discussed crisis resources should mood worsen, P provided 
text   
hotline number for crisis. P reviewed crisis plan with patient and patient is 
able   
to contact positive supports and family when feeling down  
   
                                                    Last Documented On   
1 11:46AM ; [facility]  
   
                                                    Discussed nutritional needs   
teach healthy choices including fruits and   
vegetables  
   
                                                    Last Documented On   
1 2:11PM ; [facility]  
   
                                                    Patient education about a pr  
oper diet  
   
                                                    Last Documented On   
1 2:11PM ; [facility]  
   
                                                    Discussed concerns about exe  
rcise : promote physical activity  
   
                                                    Last Documented On   
1 2:11PM ; [facility]  
Work Phone: [phone]  
  
Includes: Instructions for all patient encounters  
  
  
  
                                                    Education and Decision Aids   
were provided during visit for:  
   
                                                    ~*P offered active and sup  
portive listening, normalized emotions and feelings,  
and   
processed ~current stressors. ~*Discussed engageing in counseling and looking 
into   
EMDR to address PTSD and increased nightmares  
   
                                                    Last Documented On   
4 4:14PM ; [facility]  
   
                                                    Discussed nutritional needs   
teach healthy choices including fruits and   
vegetables  
   
                                                    Last Documented On   
4 4:33PM ; [facility]  
   
                                                    Patient education about a pr  
oper diet  
   
                                                    Last Documented On   
4 4:33PM ; [facility]  
   
                                                    Discussed concerns about exe  
rcise : promote physical activity  
   
                                                    Last Documented On   
4 4:33PM ; [facility]  
rtive listening and processed recent stressors. ~P  
  
discussed coping skills and supports to implement in managing increased anxiety 
such   
as tools learned previously in therapy. ~P discussed sleep hygiene strategies 
that   
can be implemented. ~[facility] encouraged patient to follow-up with specialists as   
scheduled  
   
                                                    Last Documented On   
4 3:26PM ; [facility]  
   
                                                    Discussed nutritional needs   
teach healthy choices including fruits and   
vegetables  
   
                                                    Last Documented On   
4 4:51PM ; [facility]  
   
                                                    Patient education about a pr  
oper diet  
   
                                                    Last Documented On   
4 4:51PM ; [facility]  
   
                                                    Discussed concerns about exe  
rcise : promote physical activity  
   
                                                    Last Documented On   
4 4:51PM ; [facility]  
   
                                                    Referred Patient to a [facility]  
es Self-Management Program  
   
                                                    Last Documented On   
4 5:22PM ; [facility] offered active and suppo  
rtive listening and processed current stressors.   
~P   
discussed coping skills and supports that can be implemented to manage increased
  
anxiety and stressors. P discussed potential of resuming counseling, even if 
for   
monthly visits to process ongoing stressors. ~[facility] encouraged patient to follow-
up on   
referrals as discussed with PCP  
   
                                                    Last Documented On   
4 10:08AM ; [facility]  
   
                                                    Discussed nutritional needs   
teach healthy choices including fruits and   
vegetables  
   
                                                    Last Documented On   
4 4:46PM ; [facility]  
   
                                                    Patient education about a pr  
oper diet  
   
                                                    Last Documented On   
4 4:46PM ; [facility]  
   
                                                    Discussed concerns about exe  
rcise : promote physical activity  
   
                                                    Last Documented On   
4 4:46PM ; [facility]  
   
                                                    Not requesting contraception  
   
                                                    Last Documented On   
4 4:46PM ; [facility] offered active and suppo  
rtive listening and processed current stressors   
related   
to getting medications. ~P discussed coping skills and supports to implement 
in   
daily routine. ~[facility] discussed progress patient has felt they have made recently 
and   
encouraged continued follow-up with providers to address health  
   
                                                    Last Documented On   
4 5:16PM ; [facility]  
   
                                                    Discussed nutritional needs   
teach healthy choices including fruits and   
vegetables  
   
                                                    Last Documented On   
4 7:14PM ; [facility]  
   
                                                    Patient education about a pr  
oper diet  
   
                                                    Last Documented On   
4 7:14PM ; [facility]  
   
                                                    Discussed concerns about exe  
rcise : promote physical activity  
   
                                                    Last Documented On   
4 7:14PM ; [facility]  
   
                                                    Not requesting contraception  
   
                                                    Last Documented On   
4 7:14PM ; [facility]  
   
                                                    Discussed nutritional needs   
teach healthy choices including fruits and   
vegetables  
   
                                                    Last Documented On   
3 5:15PM ; [facility]  
   
                                                    Patient education about a pr  
oper diet  
   
                                                    Last Documented On   
3 5:15PM ; [facility]  
   
                                                    Discussed concerns about exe  
rcise : promote physical activity  
   
                                                    Last Documented On   
3 5:15PM ; [facility]  
   
                                                    Discussed nutritional needs   
teach healthy choices including fruits and   
vegetables  
   
                                                    Last Documented On 10/21/202  
2 1:42PM ; [facility]  
   
                                                    Patient education about a pr  
oper diet  
   
                                                    Last Documented On 10/21/202  
2 1:42PM ; [facility]  
   
                                                    Discussed concerns about exe  
rcise : promote physical activity  
   
                                                    Last Documented On 10/21/202  
2 1:42PM ; [facility] offered active listening  
 and supportive feedback; normalized emotions and   
feelings, also provided pt time to process any current stressors. ~Promoted and   
encouraged follow-through with scheduling psychiatric services  
   
                                                    Last Documented On   
2 3:21PM ; [facility] provided supportive, empa  
thic listening and reflective feedback. ~Explored,   
encouraged, and supported the pt to discuss current sx/mood, assess risk for   
harm/need, coping mechanisms, support network and safety planning. ~Supported 
pt's   
plan to f/up with Dr. Alonso, as planned at [place], [place]. ~Encouraged 
pt to   
use safety plan, if needed to ensure she remains safe  
   
                                                    Last Documented On   
2 2:27PM ; [facility]  
   
                                                    Discussed nutritional needs   
teach healthy choices including fruits and   
vegetables  
   
                                                    Last Documented On   
2 3:20PM ; [facility]  
   
                                                    Patient education about a pr  
oper diet  
   
                                                    Last Documented On   
2 3:20PM ; [facility]  
   
                                                    Discussed concerns about exe  
rcise : promote physical activity ~ ~Will restart   
trazodone and prazosin ~ ~Patient is planning to have brother stay with her for 
a few   
days for emotional support ~ ~Follow up with PCP at next scheduled visit ~ ~Call
  
psychiatrist office to schedule appt ~ ~Call counselor  
   
                                                    Last Documented On   
2 9:35AM ; [facility]  
   
                                                    Provided supportive listenin  
g and empathic feedback; encouraged, explored, and   
supported the pt as she processed current symptoms, Issues, and concerns. 
~Discussed   
past tx and explored current needs/options. Acknowledged and validated pt's 
thoughts   
and emotions. ~Explored coping mechanisms and support network; utilized 
opportunity   
for safety planning; promoted seeking positive support and seeking help, as 
needed  
   
                                                    Last Documented On   
2 3:28PM ; [facility] provided active listenin  
g, support and helped pt process though current   
symptoms   
and stressor(s). Discussed and explored past effectiveness of medication; 
identified   
objectives and future goals; promoted use of healthy coping mechanisms, and 
self-care   
practices  
   
                                                    Last Documented On   
2 3:19PM ; [facility]  
   
                                                    Reviewed side effects and Ri  
sks/Benefits analysis  
   
                                                    Last Documented On   
2 3:19PM ; [facility]  
   
                                                    Discussed nutritional needs   
teach healthy choices including fruits and   
vegetables  
   
                                                    Last Documented On   
2 3:15PM ; [facility]  
   
                                                    Patient education about a pr  
oper diet  
   
                                                    Last Documented On   
2 3:15PM ; [facility]  
   
                                                    Discussed concerns about exe  
rcise : promote physical activity  
   
                                                    Last Documented On   
2 3:15PM ; [facility] introduced pt to [facility] in  
tegrated model of care ~P offered active listening  
and   
supportive feedback; normalized emotions and feelings, also provided pt time to   
process any current stressors ~[facility] discussed potential benefits of counseling 
and   
supported re-engaging, as needed. ~P encouraged pt to continue to make time to
  
implement self-care regimen and use coping methods, as needed  
   
                                                    Last Documented On   
2 4:07PM ; [facility]  
   
                                                    Discussed nutritional needs   
teach healthy choices including fruits and   
vegetables  
   
                                                    Last Documented On   
2 3:15PM ; [facility]  
   
                                                    Patient education about a pr  
oper diet  
   
                                                    Last Documented On   
2 3:15PM ; [facility]  
   
                                                    Inquiry and counseling about  
 medication administration and compliance  
   
                                                    Last Documented On   
2 7:37PM ; [facility]  
   
                                                    Discussed concerns about exe  
rcise : promote physical activity  
   
                                                    Last Documented On   
2 3:15PM ; [facility]  
   
                                                    Patient goals discussed  
   
                                                    Last Documented On   
2 7:37PM ; [facility]  
   
                                                    Ansewred pt's questions re B  
orderlline Personality D/O and Bipolar D/O raised by  
  
psychiatrist at [place]. ~Validated and normalized patient?s feelings while   
assisting to process recent events  
   
                                                    Last Documented On   
1 1:33AM ; [facility]  
   
                                                    Discussed nutritional needs   
teach healthy choices including fruits and   
vegetables  
   
                                                    Last Documented On   
1 2:04PM ; [facility]  
   
                                                    Patient education about a pr  
oper diet  
   
                                                    Last Documented On   
1 2:04PM ; [facility]  
   
                                                    Discussed concerns about exe  
rcise : promote physical activity  
   
                                                    Last Documented On   
1 2:04PM ; [facility] provided active listenin  
g, support and helped patient process through   
current   
symptoms and stressors with ongoing mental health concerns and medication 
changes.   
[facility] discussed coping skills and supports that patient is implementing.  
discussed   
implementing coping skills as discussed with counseling and attending weekly   
appointments as scheduled with counselor. Patient was encouraged to continue 
writing   
down concerns with medications and discuss with providers. [facility] reminded patient
of   
crisis resources should they be needed. Patient reports having crisis resources 
and   
could return to ER  
   
                                                    Last Documented On   
1 10:17AM ; [facility]  
   
                                                    Patient education about a pr  
oper diet  
   
                                                    Last Documented On  5:17PM ; [facility]  
   
                                                    Patient education about meal  
 planning  
   
                                                    Last Documented On  5:17PM ; [facility]  
   
                                                    Education about changing eat  
ing habits  
   
                                                    Last Documented On  5:17PM ; [facility]  
   
                                                    Patient education about high  
 fiber diet  
   
                                                    Last Documented On  5:17PM ; [facility]  
   
                                                    Patient education about low   
fat diet  
   
                                                    Last Documented On  5:17PM ; [facility]  
   
                                                    Patient education about low   
cholesterol diet  
   
                                                    Last Documented On  5:17PM ; [facility]  
   
                                                    Patient education about low   
carbohydrate diet  
   
                                                    Last Documented On  5:17PM ; [facility]  
   
                                                    Patient education about high  
 protein diet  
   
                                                    Last Documented On  5:17PM ; [facility] offered active and suppo  
rtive listening, normalized emotions and feelings,   
and   
processed current stressors. [facility] discussed resources for finding a counselor 
and   
provided list of local resources. [facility] discussed patients coping skills and 
supports   
and encouraged patient to continue to implement. [facility] reminded patient of crisis
  
resources should they be needed  
   
                                                    Last Documented On   
1 7:15PM ; [facility]  
   
                                                    Discussed nutritional needs   
teach healthy choices including fruits and   
vegetables  
   
                                                    Last Documented On  11:38AM ; [facility]  
   
                                                    Patient education about a pr  
oper diet  
   
                                                    Last Documented On  11:38AM ; [facility]  
   
                                                    Patient education about a pr  
oper diet  
   
                                                    Last Documented On   
1 12:19PM ; [facility]  
   
                                                    Patient education about meal  
 planning  
   
                                                    Last Documented On  12:19PM ; [facility]  
   
                                                    Education about changing eat  
ing habits  
   
                                                    Last Documented On   
1 12:19PM ; [facility]  
   
                                                    Patient education about high  
 fiber diet  
   
                                                    Last Documented On   
1 12:19PM ; [facility]  
   
                                                    Patient education about low   
fat diet  
   
                                                    Last Documented On   
1 12:19PM ; [facility]  
   
                                                    Patient education about low   
cholesterol diet  
   
                                                    Last Documented On   
1 12:19PM ; [facility]  
   
                                                    Patient education about low   
carbohydrate diet  
   
                                                    Last Documented On   
1 12:19PM ; [facility]  
   
                                                    Patient education about high  
 protein diet  
   
                                                    Last Documented On   
1 12:19PM ; [facility]  
   
                                                    Discussed concerns about exe  
rcise : promote physical activity  
   
                                                    Last Documented On   
1 11:38AM ; [facility]  
g, support and helped patient process through   
current   
symptoms and stressors related to family conflict. ~P discussed coping skills 
and   
supports with patient that can be implemented and reminded patient of ways to 
access   
additional resources. ~P discussed crisis resources and plan. Patient has 
crisis   
resources still available should they be needed  
   
                                                    Last Documented On 06/10/202  
1 7:04PM ; [facility]  
   
                                                    Discussed nutritional needs   
teach healthy choices including fruits and   
vegetables  
   
                                                    Last Documented On 06/10/202  
1 3:57PM ; [facility]  
   
                                                    Patient education about a pr  
oper diet  
   
                                                    Last Documented On 06/10/202  
1 3:57PM ; [facility]  
   
                                                    Discussed concerns about exe  
rcise : promote physical activity  
   
                                                    Last Documented On 06/10/202  
1 3:57PM ; [facility]. [facility] and PCP reassured   
patient of not sharing information with anyone unless she has signed a release 
for us   
to do so. ~P provided active listening, support and helped patient process 
through   
current symptoms and stressors. ~P discussed establishing counseling and   
psychiatry. P discussed EMDR therapy and ways to find provider who does this 
type   
of therapy. ~P discussed crisis resources should mood worsen, P provided 
text   
hotline number for crisis. P reviewed crisis plan with patient and patient is 
able   
to contact positive supports and family when feeling down  
   
                                                    Last Documented On   
1 11:46AM ; [facility]  
   
                                                    Discussed nutritional needs   
teach healthy choices including fruits and   
vegetables  
   
                                                    Last Documented On   
1 2:11PM ; [facility]  
   
                                                    Patient education about a pr  
oper diet  
   
                                                    Last Documented On   
1 2:11PM ; [facility]  
   
                                                    Discussed concerns about exe  
rcise : promote physical activity  
   
                                                    Last Documented On   
1 2:11PM ; [facility]  
Work Phone: [phone] problem outcome Narrative  
  
Includes: Evaluations & Outcomes for active Goals  
  
[facility]  
Work Phone: [phone] note*   
  
Progress note  
  
  
  
                                Date            Encounter       Last Documented   
by  
   
                                10/07/2024      Chart Update    Last documented   
on 10/07/2024; 12:07 PM, Doreen Cabrera CNP;   
[facility]  
  
  
  
                                                      
  
  
** Active Problems & Conditions **  
- F90.9 - Attention-deficit Hyperactivity Disorder  
- F31.31 - Bipolar I Disorder, Most Recent Episode, Depressed Mild  
- E11.9 - Diabetes Mellitus Type 2 Without Complication  
- N94.6 - Dysmenorrhea  
- F43.10 - Post-traumatic Stress Disorder  
  
** Current Medication **  
- Alcohol Pads 70% use to cleanse skin prior to checking blood sugars, 90 days, 
3   
refills  
- ARIPiprazole 5 MG Oral Tablet take 1 tablet by mouth once daily, 30 days, 5 
refills  
- Atorvastatin Calcium 20 MG Oral Tablet take 1 tablet by mouth once daily at   
bedtime, 30 days, 5 refills  
- Blood Glucose System Sriram Kit use to check sugars once daily (use what is 
covered),   
30 days, 0 refills  
- busPIRone HCl 10 MG Oral Tablet take 1 tablet by mouth twice daily (d/c 5 mg 
rx),   
30 days, 5 refills  
- CareSens Lancets Miscellaneous use to check sugars once daily (dispense what 
is   
covered), 90 days, 3 refills  
- Colace 100 MG Oral Capsule take 1 capsule by mouth once daily at bedtime as 
needed   
for constipation, 30 days, 5 refills  
- [facility] Iron 325 (65 Fe) MG Oral Tablet take 1 tablet by mouth once daily, 30 
days, 5   
refills  
- Intuniv 1 MG Oral Tablet Extended Release 24 Hour take 1 tablet by mouth once 
daily   
at bedtime, 30 days, 5 refills  
- Januvia 50 MG Oral Tablet take 1 tablet by mouth once daily, 30 days, 5 
refills  
- lamoTRIgine 100 MG Oral Tablet take 1 tablet by mouth once daily, 30 days, 5   
refills  
- Lisinopril 5 MG Oral Tablet take 1 tablet by mouth once daily, 30 days, 5 
refills  
- MiraLax 17 GM/SCOOP Oral Powder mix 1 scoop with 8 ounces once daily, 30 days,
2   
refills  
- Narcan 4 MG/0.1ML Nasal Liquid spray in nostril for symptoms of overdose , may
  
repeat in 2 minutes if symptoms persist, 1 days, 0 refills  
- OneTouch Ultra In Vitro Strip USE ONE TEST STRIP TO CHECK BLOOD SUGARS ONCE 
DAILY,   
90 days, 3 refills  
- Prazosin HCl 1 MG Oral Capsule take 1 capsule by mouth twice daily, 30 days, 5
  
refills  
- SEROquel 50 MG Oral Tablet take 1 tablet by mouth once daily at bedtime (d/c   
trazodone), 30 days, 1 refills  
- Sertraline HCl 50 MG Oral Tablet take 1 tablet by mouth once daily, 30 days, 5
  
refills  
- Slynd 4 MG Oral Tablet take 1 tablet by mouth at the same time everyday to 
help   
regulate your period, 28 days, 2 refills  
  
** Past Medical/Surgical History **  
Reported:  
No Safety Measures. Has sex without a condom.  
Medical: No previous hospitalizations. Chronic illness and Sexually transmitted   
infection Partners sexually transmitted infection status known.  
Immunization History: Recent immunization for flu.  
Exposure: Exposure to COVID-19.  
Pregnancy: Previously pregnant 1 time(s) and para having 0 live birth(s). Not   
planning a pregnancy in the next year.  
Legal Documents: Consent form on file for procedure.  
Diagnoses:  
Polycystic Ovarian Syndrome (PCOS).  
Migraine headache.  
Psychiatric disorders biopolar disorder  
Anxiety disorder  
POTS.  
Procedural:  
- Insertion of ear pressure equalization tubes in both ears  
Surgical:  
- Tonsillectomy  
- Tonsillectomy with adenoidectomy  
  
** Allergies **  
- Abilify Reaction: groggy  
- Augmentin Reaction: Shock  
- Metformin Reaction: Nausea, Vomiting  
- NO KNOWN ENVIRONMENTAL ALLERGIES  
- NO KNOWN FOOD ALLERGIES  
- Sertraline Reaction: slow/groggy  
- Trazodone Hydrochloride Reaction: Panic attack  
  
** Family History **  
Paternal:  
Systemic hypertension  
Oncologic disorder  
Maternal:  
Systemic hypertension  
Epilepsy and recurrent seizures  
Psychiatric disorders  
Oncologic disorder  
Fraternal:  
Psychiatric disorders  
  
** Assessment **  
- D50.9 - Iron deficiency anemia, unspecified  
  
** Plan **  
StartCited- Iron deficiency anemia, unspecified  
Lab: Iron and TIBC  
Lab: CBC With Differential/Platelet  
EndCited  
** Care Team **  
- Doreen Cabrera CNP  
  
  
[facility] 
[facility]  
Work Phone: [phone] of systems Narrative - Reported  
  
Review of Systems not supported for this document type  
  
No [facility] of Systems [facility]ed[facility]  
Work Phone: [phone]  
  
Summary Purpose  
  
  
                                                      
  
  
  
Family History  
  
  
                                        Description         Last Updated  
   
                                        Maternal history of epilepsy and recurre  
nt seizures 2021  
   
                                        Fraternal history of psychiatric disorde  
rs 2021  
   
                                        Maternal history of hypertension   
021  
   
                                        Maternal history of oncologic disorder 0  
2021  
   
                                        Maternal history of psychiatric disorder  
s 2021  
   
                                        Paternal history of hypertension   
021  
   
                                        Paternal history of oncologic disorder 0  
2021  
  
  
  
                                        Description         Last Updated  
   
                                        Maternal history of epilepsy and recurre  
nt seizures 2021  
  
  
  
                                                    Last Documented On   
1 4:06PM ; [facility]  
  
  
  
                                        Fraternal history of psychiatric disorde  
rs 2021  
   
                                        Maternal history of hypertension   
021  
   
                                        Maternal history of oncologic disorder 0  
2021  
   
                                        Maternal history of psychiatric disorder  
s 2021  
   
                                        Paternal history of hypertension   
021  
   
                                        Paternal history of oncologic disorder 0  
2021  
  
  
  
                                        Description         Last Updated  
   
                                        Maternal history of epilepsy and recurre  
nt seizures 2021  
  
  
  
                                                    Last Documented On   
1 4:06PM ; [facility]  
  
  
  
                                        Fraternal history of psychiatric disorde  
rs 2021  
   
                                        Maternal history of hypertension   
021  
   
                                        Maternal history of oncologic disorder 0  
2021  
   
                                        Maternal history of psychiatric disorder  
s 2021  
   
                                        Paternal history of hypertension   
021  
   
                                        Paternal history of oncologic disorder 0  
2021  
  
  
  
                                        Description         Last Updated  
   
                                        Maternal history of epilepsy and recurre  
nt seizures 2021  
  
  
  
                                                    Last Documented On   
1 4:06PM ; [facility]  
  
  
  
                                        Fraternal history of psychiatric disorde  
rs 2021  
   
                                        Maternal history of hypertension   
021  
   
                                        Maternal history of oncologic disorder 0  
2021  
   
                                        Maternal history of psychiatric disorder  
s 2021  
   
                                        Paternal history of hypertension   
021  
   
                                        Paternal history of oncologic disorder 0  
2021  
  
  
  
                                        Description         Last Updated  
   
                                        Maternal history of epilepsy and recurre  
nt seizures 2021  
  
  
  
                                                    Last Documented On   
1 4:06PM ; [facility]  
  
  
  
                                        Fraternal history of psychiatric disorde  
rs 2021  
   
                                        Maternal history of hypertension   
021  
   
                                        Maternal history of oncologic disorder 0  
2021  
   
                                        Maternal history of psychiatric disorder  
s 2021  
   
                                        Paternal history of hypertension   
021  
   
                                        Paternal history of oncologic disorder 0  
2021  
  
  
  
                                        Description         Last Updated  
   
                                        Maternal history of epilepsy and recurre  
nt seizures 2021  
  
  
  
                                                    Last Documented On   
1 4:06PM ; [facility]  
  
  
  
                                        Fraternal history of psychiatric disorde  
rs 2021  
   
                                        Maternal history of hypertension   
021  
   
                                        Maternal history of oncologic disorder 0  
2021  
   
                                        Maternal history of psychiatric disorder  
s 2021  
   
                                        Paternal history of hypertension   
021  
   
                                        Paternal history of oncologic disorder 0  
2021  
  
  
  
                                        Description         Last Updated  
   
                                        Maternal history of epilepsy and recurre  
nt seizures 2021  
  
  
  
                                                    Last Documented On   
1 4:06PM ; [facility]  
  
  
  
                                        Fraternal history of psychiatric disorde  
rs 2021  
   
                                        Maternal history of hypertension   
021  
   
                                        Maternal history of oncologic disorder 0  
2021  
   
                                        Maternal history of psychiatric disorder  
s 2021  
   
                                        Paternal history of hypertension   
021  
   
                                        Paternal history of oncologic disorder 0  
2021  
  
  
  
                                        Description         Last Updated  
   
                                        Maternal history of epilepsy and recurre  
nt seizures 2021  
  
  
  
                                                    Last Documented On   
1 4:06PM ; [facility]  
  
  
  
                                        Fraternal history of psychiatric disorde  
rs 2021  
   
                                        Maternal history of hypertension   
021  
   
                                        Maternal history of oncologic disorder 0  
2021  
   
                                        Maternal history of psychiatric disorder  
s 2021  
   
                                        Paternal history of hypertension   
021  
   
                                        Paternal history of oncologic disorder 0  
2021  
  
  
  
                                        Description         Last Updated  
   
                                        Maternal history of epilepsy and recurre  
nt seizures 2021  
  
  
  
                                                    Last Documented On   
1 4:06PM ; [facility]  
  
  
  
                                        Fraternal history of psychiatric disorde  
rs 2021  
   
                                        Maternal history of hypertension   
021  
   
                                        Maternal history of oncologic disorder 0  
2021  
   
                                        Maternal history of psychiatric disorder  
s 2021  
   
                                        Paternal history of hypertension   
021  
   
                                        Paternal history of oncologic disorder 0  
2021  
  
  
  
                                        Description         Last Updated  
   
                                        Maternal history of epilepsy and recurre  
nt seizures 2021  
  
  
  
                                                    Last Documented On   
1 4:06PM ; [facility]  
  
  
  
                                        Fraternal history of psychiatric disorde  
rs 2021  
   
                                        Maternal history of hypertension   
021  
   
                                        Maternal history of oncologic disorder 0  
2021  
   
                                        Maternal history of psychiatric disorder  
s 2021  
   
                                        Paternal history of hypertension   
021  
   
                                        Paternal history of oncologic disorder 0  
2021  
  
  
  
                                        Description         Last Updated  
   
                                        Maternal history of epilepsy and recurre  
nt seizures 2021  
  
  
  
                                                    Last Documented On  4:06PM ; [facility]  
  
  
  
                                        Fraternal history of psychiatric disorde  
rs 2021  
   
                                        Maternal history of hypertension   
021  
   
                                        Maternal history of oncologic disorder 0  
2021  
   
                                        Maternal history of psychiatric disorder  
s 2021  
   
                                        Paternal history of hypertension   
021  
   
                                        Paternal history of oncologic disorder 0  
2021  
  
  
  
                                        Description         Last Updated  
   
                                        Maternal history of epilepsy and recurre  
nt seizures 2021  
  
  
  
                                                    Last Documented On   
1 4:06PM ; [facility]  
  
  
  
                                        Fraternal history of psychiatric disorde  
rs 2021  
   
                                        Maternal history of hypertension   
021  
   
                                        Maternal history of oncologic disorder 0  
2021  
   
                                        Maternal history of psychiatric disorder  
s 2021  
   
                                        Paternal history of hypertension   
021  
   
                                        Paternal history of oncologic disorder 0  
2021  
  
  
  
                                        Description         Last Updated  
   
                                        Maternal history of epilepsy and recurre  
nt seizures 2021  
  
  
  
                                                    Last Documented On   
1 4:06PM ; [facility]  
  
  
  
                                        Fraternal history of psychiatric disorde  
rs 2021  
   
                                        Maternal history of hypertension   
021  
   
                                        Maternal history of oncologic disorder 0  
2021  
   
                                        Maternal history of psychiatric disorder  
s 2021  
   
                                        Paternal history of hypertension   
021  
   
                                        Paternal history of oncologic disorder 0  
2021  
  
  
  
Advance Directives  
  
  
Includes: Current Advance Directives  
  
No Advance Directives Recorded  Documents on File  
  
                          Type         Date Recorded Patient Representative Expl  
anation  
   
                          ACP-Advance Directive                             
   
                          ACP-Power of                              
  
                                Documents on File  
  
                          Type         Date Recorded Patient Representative Expl  
anation  
   
                          ACP-Advance Directive                             
   
                          ACP-Power of                              
  
  
  
Physical Exam  
  
  
Physical Exam not supported for this document type  
  
No Physical Exam Recorded  
  
Physical Exam not supported for this document type  
  
No Physical Exam Recorded  
  
Physical Exam not supported for this document type  
  
No Physical Exam Recorded  
  
Physical Exam not supported for this document type  
  
No Physical Exam Recorded  
  
Physical Exam not supported for this document type  
  
No Physical Exam Recorded  
  
Physical Exam not supported for this document type  
  
No Physical Exam Recorded  
  
Physical Exam not supported for this document type  
  
No Physical Exam Recorded  
  
Physical Exam not supported for this document type  
  
No Physical Exam Recorded  
  
Physical Exam not supported for this document type  
  
No Physical Exam Recorded  
  
Physical Exam not supported for this document type  
  
No Physical Exam Recorded  
  
Physical Exam not supported for this document type  
  
No Physical Exam Recorded  
  
Physical Exam not supported for this document type  
  
No Physical Exam Recorded  
  
Physical Exam not supported for this document type  
  
No Physical Exam Recorded  
  
Physical Exam not supported for this document type  
  
No Physical Exam Recorded  
  
Physical Exam not supported for this document type  
  
No Physical Exam Recorded  
  
Physical Exam not supported for this document type  
  
No Physical Exam Recorded  
  
Physical Exam not supported for this document type  
  
No Physical Exam Recorded  
  
Physical Exam not supported for this document type  
  
No Physical Exam Recorded  
  
Physical Exam not supported for this document type  
  
No Physical Exam Recorded  
  
Physical Exam not supported for this document type  
  
No Physical Exam Recorded  
  
Physical Exam not supported for this document type  
  
No Physical Exam Recorded  
  
Physical Exam not supported for this document type  
  
No Physical Exam Recorded  
  
Physical Exam not supported for this document type  
  
No Physical Exam Recorded  
  
Physical Exam not supported for this document type  
  
No Physical Exam Recorded  
  
Physical Exam not supported for this document type  
  
No Physical Exam Recorded  
  
Physical Exam not supported for this document type  
  
No Physical Exam Recorded  
  
Physical Exam not supported for this document type  
  
No Physical Exam Recorded  
  
Physical Exam not supported for this document type  
  
No Physical Exam Recorded  
  
Physical Exam not supported for this document type  
  
No Physical Exam Recorded  
  
Reason for Referral  
  
  
                          Status       Reason       Specialty    Diagnoses /   
Procedures                              Referred By   
Contact                                 Referred To   
Contact  
   
                          Pending Review                             
  
  
Diagnoses  
  
  
Tachycardia  
  
  
  
Procedures  
  
  
Holter Monitor 48   
Hour                                      
  
  
Doreen Cabrera,   
APRN - CNP  
  
  
[address]  
  
  
[place]   
[address]  
  
  
Phone:   
[phone]  
  
  
Fax: [phone]                         
  
  
  
  
  
Additional Source Comments  
  
  
  
                                                    INFORMATION SOURCE (unrecogn  
ized section and content)  
   
                                          
  
  
  
                                        DATE CREATED        AUTHOR  
   
                                2018                      [facility]  
  
  
  
                                DATE CREATED    AUTHOR          AUTHOR'S ORGANIZ  
ATION  
   
                                2021                      [facility]  
  
  
  
                                DATE CREATED    AUTHOR          AUTHOR'S ORGANIZ  
ATION  
   
                                2021                      [facility]  
  
  
  
                                DATE CREATED    AUTHOR          AUTHOR'S ORGANIZ  
ATION  
   
                                2021                      [facility]  
  
  
  
                                DATE CREATED    AUTHOR          AUTHOR'S ORGANIZ  
ATION  
   
                                10/05/2021                      [facility]  
  
  
  
                                DATE CREATED    AUTHOR          AUTHOR'S ORGANIZ  
ATION  
   
                                10/25/2022                      [facility]  
  
  
  
                                DATE CREATED    AUTHOR          AUTHOR'S ORGANIZ  
ATION  
   
                                05/10/2024                      [facility]  
  
  
  
  
  
                                                    Reason for Visit (unrecogniz  
ed section and content)  
   
                                          
  
  
  
                                        Reason              Comments  
   
                                        Suicidal            with a plan of overd  
osing on zoloft  
  
  
  
                                        Reason              Comments  
   
                                        Mental Health Problem patient states she  
 is feeling homicidal and wants meds   
adjusted, doesn't feel they are working  
   
                                        Panic Attack          
  
  
  
                                        Reason              Comments  
   
                                        Homicidal           pt states she is on   
a  donw slope  of her bipolar, pt states  I want   
to   
kill anyone who pisses me off   
  
  
  
                          Status       Reason       Specialty    Diagnoses /   
Procedures                              Referred By   
Contact                                 Referred To   
Contact  
   
                          Pending Review                             
  
  
Diagnoses  
  
  
Tachycardia  
  
  
  
Procedures  
  
  
Holter Monitor 48   
Hour                                      
  
  
Doreen Cabrera,   
APRN - CNP  
  
  
[address] [place]   
[address]  
  
  
Phone:   
[phone]  
  
  
Fax: [phone]                         
  
  
  
  
  
                                    Scheduled  
  
                                                    Active and Recently Administ  
ered Medications (unrecognized section and content)  
   
                                          
  
  
  
                          Medication Order 2021  
   
                                                      
  
  
ALPRAZolam (XANAX) tablet 0.5 mg   
(COMPLETED)  
0.5 mg, Oral, ONCE, On Sat   
21 at 2100, For 1 dose  
                                                     (Given - Provider: Dottie Barcenas RN)  
                                          
  
                                       PRN  
  
                          Medication Order 2021  
   
                                                      
  
  
hydrOXYzine (VISTARIL) capsule   
50 mg  
50 mg, Oral, 3 TIMES DAILY PRN,   
Itching, Starting on e 21   
at [id number] (Given - Provid  
er: Kareem Montiel RN)  
  
  
  
  
  
  
                                                    Care Teams (unrecognized sec  
tion and content)  
   
                                          
  
  
  
                      Team Member Relationship Specialty  Start Date End Date  
   
                                                      
  
  
Doreen Cabrera, APRN - CNP  
  
  
[address] [place] [address]  
  
  
[phone] (Work)  
  
  
[phone] (Fax) PCP - General   Family Medicine 21           
  
  
FOR RECORDS PERTAINING TO PATIENTS WHO ARE OR HAVE BEEN ENROLLED IN A CHEMICAL 
DEPENDENCY/SUBSTANCEABUSE PROGRAM, SOME INFORMATION MAY BE OMITTED. This 
clinical summary was aggregated from multiple sources. Caution should be 
exercised in using it in the provision of clinical care. This summary normalizes
information from multiple sources, and as a consequence, information in this 
document may materially change the coding, format and clinical context of 
patient data. In addition, data may be omitted in some cases. CLINICAL DECISIONS
SHOULD BE BASED ON THE PRIMARY CLINICAL RECORDS. [facility]. provides 
no warranty or guarantee of the accuracy or completeness of information in this 
document.

## 2024-10-19 NOTE — ED_ITS
HPI - Female Genitourinary    
General    
Chief complaint: Vaginal Bleeding    
Stated complaint: BLEED POST TRANSFUSION    
Time Seen by Provider: 10/19/24 16:51    
Source: patient    
Mode of arrival: Wheelchair    
Limitations: no limitations    
History of Present Illness    
HPI Narrative:     
Patient is a 24-year-old female with a history of PCOS who returns to the   
emergency department for reevaluation of vaginal bleeding.  This patient was see  
n in the ER 2 days ago and was found to be anemic requiring a blood transfusion   
secondary to a 5-month history of dysfunctional uterine bleeding.  She had an   
ultrasound showing a thickened endometrium, she was found to have a hemoglobin   
of 5.9 and was admitted receiving 3 units of blood.  She states today she had a   
return of heavy vaginal bleeding and was concerned that she may be bleeding too   
much again.  She states she feels mildly dizzy.  She was started on megestrol   
twice daily while in the ER and discharged home with this medication.  She has   
an appoint with OB/GYN in 2 days.    
Related Data    
                                Home Medications    
    
    
    
?Medication ?Instructions ?Recorded ?Confirmed    
     
aripiprazole 5 mg tablet 5 mg PO DAILY 10/17/24 10/17/24    
     
atorvastatin 20 mg tablet 20 mg PO .QHS 10/17/24 10/17/24    
     
blood sugar diagnostic (OneTouch  10/17/24 10/17/24    
    
Ultra Test strips)       
     
blood-glucose meter (OneTouch  10/17/24 10/17/24    
    
Ultra2 Meter)       
     
buspirone 10 mg tablet 10 mg PO BID 10/17/24 10/17/24    
     
docusate sodium 100 mg capsule 100 mg PO .QHS PRN constipation 10/17/24 10/17/24    
     
drospirenone (contraceptive) 4 mg 4 mg PO DAILY 10/17/24 10/17/24    
    
(28) tablet (Slynd)       
     
ferrous sulfate 325 mg (65 mg 325 mg PO DAILY 10/17/24 10/17/24    
    
iron) tablet (FeroSul)       
     
guanfacine 1 mg tablet,extended 1 mg PO .QHS 10/17/24 10/17/24    
    
release 24 hr       
     
lamotrigine 100 mg tablet 100 mg PO DAILY 10/17/24 10/17/24    
     
lisinopril 5 mg tablet 5 mg PO DAILY 10/17/24 10/17/24    
     
prazosin 1 mg capsule 1 mg PO BID 10/17/24 10/17/24    
     
prazosin 1 mg capsule 1 mg PO Q12H 10/17/24 10/17/24    
     
quetiapine 50 mg tablet 50 mg PO .QHS 10/17/24 10/17/24    
     
quetiapine 50 mg tablet 50 mg PO .QHS 10/17/24 10/17/24    
     
sertraline 50 mg tablet 50 mg PO DAILY 10/17/24 10/17/24    
     
sertraline 50 mg tablet 50 mg PO Q24H 10/17/24 10/17/24    
     
sitagliptin phosphate 50 mg tablet 50 mg PO DAILY 10/17/24 10/17/24    
    
(Januvia)       
     
sitagliptin phosphate 50 mg tablet 50 mg PO DAILY 10/17/24 10/17/24    
    
(Januvia)       
    
    
                                  Previous Rx's    
    
    
    
?Medication ?Instructions ?Recorded    
     
megestrol 20 mg tablet 20 mg PO BID bid for 2 days, then 10/18/24    
    
 daily #30 tabs     
     
ketorolac 10 mg tablet 10 mg PO TID PRN pain #10 tabs 10/19/24    
     
promethazine 25 mg tablet 25 mg PO Q6H PRN nausea and 10/19/24    
    
 vomiting #12 tabs     
    
    
    
                                    Allergies    
    
    
    
Allergy/AdvReac Type Severity Reaction Status Date / Time    
     
amoxicillin (From Augmentin) Allergy Severe Anaphylaxis Verified 10/19/24 17:03    
     
clavulanic acid (From Allergy Severe Anaphylaxis Verified 10/19/24 17:03    
    
Augmentin)         
     
Penicillins Allergy Severe Anaphylaxis Verified 10/19/24 17:03    
    
    
    
    
Review of Systems    
    
    
ROS      
    
 Constitutional Denies: fever or chills       
    
 Cardiovascular Denies: chest pain       
    
 Respiratory Denies: shortness of breath       
    
 Gastrointestinal Reports: nausea; Denies: vomiting       
    
 Genitourinary Reports: pelvic pain       
    
 Neurological Denies: headache, numbness in extremities or weakness in   
extremities       
    
 Hematologic/Lymphatic Denies: easy bruising or easy bleeding       
    
    
[facility]    
Medical History (Updated 10/19/24 @ 17:24 by IVIS Rothman)    
    
PTSD (post-traumatic stress disorder)    
   ?F43.10 - Post-traumatic stress disorder, unspecified (ICD-10)    
Bipolar 1 disorder    
   ?F31.9 - Bipolar disorder, unspecified (ICD-10)    
Anxiety    
   ?F41.9 - Anxiety disorder, unspecified (ICD-10)    
Depression    
   ?F32.A - Depression, unspecified (ICD-10)    
PCOS (polycystic ovarian syndrome)    
   ?E28.2 - Polycystic ovarian syndrome (ICD-10)    
Insulin resistance    
   ?E88.819 - Insulin resistance, unspecified (ICD-10)    
Mastocytosis    
   ?D47.09 - Other mast cell neoplasms of uncertain behavior (ICD-10)    
POTS (postural orthostatic tachycardia syndrome)    
   ?G90.A - Postural orthostatic tachycardia syndrome [POTS] (ICD-10)    
Systemic mastocytosis    
   ?D47.02 - Systemic mastocytosis (ICD-10)    
    
    
Surgical History (Updated 10/17/24 @ 17:47 by Jelly Tomlinson)    
    
H/O esophagogastroduodenoscopy    
   ?Z98.890 - Other specified postprocedural states (ICD-10)    
Hx of tonsillectomy    
   ?Z90.89 - Acquired absence of other organs (ICD-10)    
    
    
Family History (Updated 10/17/24 @ 17:49 by Jelly Tomlinson)    
Grandmother   Family history of CHF (congestive heart failure)    
   Family history of COPD (chronic obstructive pulmonary disease)    
   Family history of hypertension    
Aunt   Family history of cancer    
   Family history of diabetes mellitus    
Grandfather   Family history of cancer    
   Family history of diabetes mellitus    
   Family history of hypertension    
Uncle   Family history of cancer    
   Family history of diabetes mellitus    
Father   Family history of hypertension    
    
    
Social History (Reviewed 10/19/24 @ 17:27 by IVIS Rothman)    
Within the past year, how often did you have a drink containing alcohol:    
monthly or less     
Within the past year, how many standard drinks containing alcohol did you have   
on a typical day:  1 or 2     
Within the past year, how often did you have six or more drinks on one occasion:  
  never     
Total score:  0     
Score interpretation:  A score less than 3 is consistent with normal alcohol   
consumption.     
Do you use any of these nicotine containing products:  vaping products     
Non-prescribed substance use:  denies use     
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     
Previous occupational history:  self employed     
Highest level of school completed/degree received:  some college, no degree     
Are you now , , , , never  or living with   
a partner:  living with partner     
Little interest or pleasure in doing things:  not at all     
Feeling down, depressed, or hopeless:  not at all     
Feel stressed/tense/nervous/anxious/difficulty sleeping:  only a little     
Gender Identity:  female     
    
    
    
Exam    
Narrative    
Exam Narrative:     
Gen.: Awake, alert, in no distress    
Head: Normocephalic, atraumatic    
ENT: Moist mucous membranes    
Respiratory: No respiratory distress    
Gastrointestinal: Abdomen is soft, nondistended and nontender to palpation    
Extremities: Moves extremities equally    
Psych: Normal mood and affect    
Neuro: No focal neuro deficit    
Skin: Warm, dry, intact    
    
Constitutional    
Vital Signs, click to edit/add:     
    
                                Last Vital Signs    
    
    
    
Temp  98.7 F   10/19/24 17:03    
     
Pulse  106 H  10/19/24 17:03    
     
Resp  20   10/19/24 17:03    
     
BP  174/86 H  10/19/24 17:03    
     
Pulse Ox  99   10/19/24 17:03    
     
O2 Del Method  Room Air  10/19/24 17:03    
    
    
    
    
    
Course    
Vital Signs    
Vital signs:     
    
                                   Vital Signs    
    
    
    
Temperature  98.7 F   10/19/24 17:03    
     
Pulse Rate  106 H  10/19/24 17:03    
     
Respiratory Rate  20   10/19/24 17:03    
     
Blood Pressure  174/86 H  10/19/24 17:03    
     
Pulse Oximetry  99   10/19/24 17:03    
     
Oxygen Delivery Method  Room Air  10/19/24 17:03    
    
    
                                            
    
    
    
Temperature  98.7 F   10/19/24 17:03    
     
Pulse Rate  106 H  10/19/24 17:03    
     
Respiratory Rate  20   10/19/24 17:03    
     
Blood Pressure  174/86 H  10/19/24 17:03    
     
Pulse Oximetry  99   10/19/24 17:03    
     
Oxygen Delivery Method  Room Air  10/19/24 17:03    
    
    
    
    
    
MDM - Female Genitourinary    
MDM Narrative    
Medical decision making narrative:     
Repeat CBC shows hemoglobin 9.3 today.  This is improved from even discharge   
after 3 units of blood.  She was given education and reassurance.  She is not   
hypotensive.  She is not actively vomiting or febrile.  She should keep her   
appointment with Dr. Patterson on Monday as scheduled.  She was given Toradol and   
Phenergan for symptoms of nausea and abdominal cramping.  Family at bedside had   
additional questions about bleeding and clotting, these questions were answered   
and they were given reassurance that the patient is likely suffering from   
withdrawal bleeding from starting the Megace.  Return to the ER if symptoms   
change or worsen    
    
    
SUPERVISED APC VISIT, PHYSICIAN ATTESTATION:     
    
Based on the medical record the care appears appropriate.    
    
?    
Medical Records    
Attestation: I reviewed the patient's medical records.    
Lab Data    
Attestation: I reviewed the patient's lab results.    
Labs:     
    
                                   Lab Results    
    
    
    
  10/19/24 Range/Units    
    
  17:04     
     
WBC  18.9 H  (4.0-11.0)  10^3/uL    
     
RBC  3.62 L  (4.20-5.40)  10^6/uL    
     
Hgb  9.3 L  (12.0-16.0)  g/dL    
     
Hct  30.2 L  (36.0-48.0)  %    
     
MCV  83.4  (81.0-99.0)  fL    
     
MCH  25.7 L  (26.7-34.0)  pg    
     
MCHC  30.8  (29.9-35.2)  g/dL    
     
RDW  17.6 H  (11.0-15.0)  %    
     
Plt Count  346  (150-450)  10^3/uL    
     
MPV  8.6 L  (9.5-13.5)  fL    
     
Neut % (Auto)  75.4 H  (43.0-75.0)  %    
     
Lymph % (Auto)  17.9 L  (20.5-60.0)  %    
     
Mono % (Auto)  4.8  (1.7-12.0)  %    
     
Eos % (Auto)  0.6 L  (0.9-7.0)  %    
     
Baso % (Auto)  0.3  (0.2-2.0)  %    
     
Neut # (Auto)  14.3 H  (1.4-6.5)  10^3/uL    
     
Lymph # (Auto)  3.4  (1.2-3.8)  10^3/uL    
     
Mono # (Auto)  0.9 H  (0.3-0.8)  10^3/uL    
     
Eos # (Auto)  0.1  (0.0-0.7)  10^3/uL    
     
Baso # (Auto)  0.1  (0.0-0.1)  10^3/uL    
     
Abs Immat Gran (auto)  0.19 H  (0.00-0.03)  10^3/uL    
     
Imm/Tot Granulo (auto)  1.0 H  (0.0-0.5)  %    
    
    
    
    
    
Discharge Plan    
Discharge    
Chief Complaint: Vaginal Bleeding    
    
Clinical Impression:    
 Vaginal bleeding    
    
    
Patient Disposition: Home, Self-Care    
    
Time of Disposition Decision: 17:24    
    
Condition: Good    
    
Prescriptions / Home Meds:    
New    
  ketorolac 10 mg tablet     
   10 mg PO TID PRN (Reason: pain) Qty: 10 0RF    
  promethazine 25 mg tablet     
   25 mg PO Q6H PRN (Reason: nausea and vomiting) Qty: 12 0RF    
    
No Action    
  atorvastatin 20 mg tablet     
   20 mg PO .QHS     
  (DME) blood-glucose meter [OneTouch Ultra2 Meter]  Misc     
     MISCELLANEOUS      
  (DME) OneTouch Ultra Test  Strip     
     MISCELLANEOUS      
  buspirone 10 mg tablet     
   10 mg PO BID     
  docusate sodium 100 mg capsule     
   100 mg PO .QHS PRN (Reason: constipation)     
  Slynd 4 mg (28) tablet     
   4 mg PO DAILY     
  ferrous sulfate [FeroSul] 325 mg (65 mg iron) tablet     
   325 mg PO DAILY     
  guanfacine 1 mg tablet extended release 24 hr     
   1 mg PO .QHS     
  lamotrigine 100 mg tablet     
   100 mg PO DAILY     
  lisinopril 5 mg tablet     
   5 mg PO DAILY     
  prazosin 1 mg capsule     
   1 mg PO BID     
  quetiapine 50 mg tablet     
   50 mg PO .QHS     
  sertraline 50 mg tablet     
   50 mg PO DAILY     
  Januvia 50 mg tablet     
   50 mg PO DAILY     
  aripiprazole 5 mg tablet     
   5 mg PO DAILY     
  prazosin 1 mg capsule     
   1 mg PO Q12H     
  quetiapine 50 mg tablet     
   50 mg PO .QHS     
  sertraline 50 mg tablet     
   50 mg PO Q24H     
  Januvia 50 mg tablet     
   50 mg PO DAILY     
  megestrol 20 mg tablet     
   20 mg PO BID MDD bid for 2 days, then daily Qty: 30 11RF    
   Rx Instructions:    
   bid for 2 days, then daily    
    
Print Language: English    
    
Instructions:  Abnormal (Dysfunctional) Uterine Bleeding (ED)    
    
Additional Instructions:    
Follow up with Dr. Patterson on Monday as scheduled    
    
Referrals:    
Jacquie Bacon NP [Primary Care Provider] - 1 week

## 2024-10-22 ENCOUNTER — HOSPITAL ENCOUNTER (EMERGENCY)
Age: 24
Discharge: HOME | End: 2024-10-22
Payer: COMMERCIAL

## 2024-10-22 ENCOUNTER — HOSPITAL ENCOUNTER
Dept: HOSPITAL 101 - PST | Age: 24
Discharge: HOME | End: 2024-10-22
Payer: COMMERCIAL

## 2024-10-22 VITALS
OXYGEN SATURATION: 98 % | TEMPERATURE: 98.96 F | SYSTOLIC BLOOD PRESSURE: 138 MMHG | HEART RATE: 101 BPM | DIASTOLIC BLOOD PRESSURE: 90 MMHG

## 2024-10-22 VITALS
TEMPERATURE: 98 F | DIASTOLIC BLOOD PRESSURE: 81 MMHG | SYSTOLIC BLOOD PRESSURE: 132 MMHG | OXYGEN SATURATION: 98 % | HEART RATE: 103 BPM

## 2024-10-22 VITALS
HEART RATE: 98 BPM | DIASTOLIC BLOOD PRESSURE: 98 MMHG | OXYGEN SATURATION: 99 % | TEMPERATURE: 99.14 F | SYSTOLIC BLOOD PRESSURE: 140 MMHG

## 2024-10-22 VITALS — BODY MASS INDEX: 57.5 KG/M2

## 2024-10-22 DIAGNOSIS — N93.8: Primary | ICD-10-CM

## 2024-10-22 DIAGNOSIS — D64.9: ICD-10-CM

## 2024-10-22 DIAGNOSIS — N93.8: ICD-10-CM

## 2024-10-22 DIAGNOSIS — F17.290: ICD-10-CM

## 2024-10-22 DIAGNOSIS — Z01.818: Primary | ICD-10-CM

## 2024-10-22 LAB
ADD MANUAL DIFF: NO
ANION GAP: 15.6
BLOOD UREA NITROGEN: 10 MG/DL (ref 7–18)
CALCIUM: 9.3 MG/DL (ref 8.5–10.1)
CARBON DIOXIDE: 22.5 MMOL/L (ref 21–32)
CHLORIDE: 107 MMOL/L (ref 98–107)
CO2 BLD-SCNC: 22.5 MMOL/L (ref 21–32)
ESTIMATED GFR (AFRICAN AMERICA: >60
ESTIMATED GFR (NON-AFRICAN AME: >60
GLUCOSE BLD-MCNC: 158 MG/DL (ref 74–106)
HCT VFR BLD CALC: 28.2 % (ref 36–48)
HEMATOCRIT: 28.2 % (ref 36–48)
HEMOGLOBIN: 8.7 G/DL (ref 12–16)
IMMATURE GRANULOCYTES ABS AUTO: 0.07 10^3/UL (ref 0–0.03)
IMMATURE GRANULOCYTES PCT AUTO: 0.5 % (ref 0–0.5)
INR: 0.97
LYMPHOCYTES  ABSOLUTE AUTO: 2.6 10^3/UL (ref 1.2–3.8)
MCV RBC: 80.6 FL (ref 81–99)
MEAN CORPUSCULAR HEMOGLOBIN: 24.9 PG (ref 26.7–34)
MEAN CORPUSCULAR HGB CONC: 30.9 G/DL (ref 29.9–35.2)
MEAN CORPUSCULAR VOLUME: 80.6 FL (ref 81–99)
PARTIAL THROMBOPLASTIN TIME: 23.9 SEC (ref 22.3–36.2)
PLATELET # BLD: 289 10^3/UL (ref 150–450)
PLATELET COUNT: 289 10^3/UL (ref 150–450)
POTASSIUM SERPLBLD-SCNC: 4.1 MMOL/L (ref 3.5–5.1)
POTASSIUM: 4.1 MMOL/L (ref 3.5–5.1)
PROTHROMBIN TIME: 10.3 SEC (ref 9–11.6)
RED BLOOD COUNT: 3.5 10^6/UL (ref 4.2–5.4)
SODIUM BLD-SCNC: 141 MMOL/L (ref 136–145)
SODIUM: 141 MMOL/L (ref 136–145)
WBC # BLD: 14.5 10^3/UL (ref 4–11)
WHITE BLOOD COUNT: 14.5 10^3/UL (ref 4–11)

## 2024-10-22 PROCEDURE — 85730 THROMBOPLASTIN TIME PARTIAL: CPT

## 2024-10-22 PROCEDURE — 99284 EMERGENCY DEPT VISIT MOD MDM: CPT

## 2024-10-22 PROCEDURE — 36415 COLL VENOUS BLD VENIPUNCTURE: CPT

## 2024-10-22 PROCEDURE — 85025 COMPLETE CBC W/AUTO DIFF WBC: CPT

## 2024-10-22 PROCEDURE — 84703 CHORIONIC GONADOTROPIN ASSAY: CPT

## 2024-10-22 PROCEDURE — 80048 BASIC METABOLIC PNL TOTAL CA: CPT

## 2024-10-22 PROCEDURE — 85610 PROTHROMBIN TIME: CPT

## 2024-10-22 PROCEDURE — 93005 ELECTROCARDIOGRAM TRACING: CPT

## 2024-10-22 NOTE — ED.GENADUL1
HPI
HPI - General Adult
General
Chief complaint: Abdominal Pain
Stated complaint: Post Surgical Issues
Time Seen by Provider: 10/22/24 21:40
Source: patient
Mode of arrival: walk-in
Limitations: no limitations
History of Present Illness
HPI narrative: 
24-year-old female presents to the emergency department for vaginal bleeding and the concern that she has gotten anemic again.  Is scheduled for a D&C in 3 days and last week had to have a blood transfusion and states she received 3 units.  She 
states the bleeding has become heavier again.  She has been feeling short of breath and dizzy.
Related Data
Home Medications

?Medication ?Instructions ?Recorded ?Confirmed
aripiprazole 5 mg tablet 5 mg PO QPM 10/17/24 10/22/24
atorvastatin 20 mg tablet 20 mg PO .QHS 10/17/24 10/22/24
blood sugar diagnostic (OneTouch  10/17/24 10/17/24
Ultra Test strips)   
blood-glucose meter (OneTouch  10/17/24 10/17/24
Ultra2 Meter)   
buspirone 10 mg tablet 10 mg PO BID 10/17/24 10/22/24
docusate sodium 100 mg capsule 100 mg PO .QHS PRN constipation 10/17/24 10/22/24
drospirenone (contraceptive) 4 mg 4 mg PO DAILY 10/17/24 10/22/24
(28) tablet (Slynd)   
ferrous sulfate 325 mg (65 mg 325 mg PO DAILY 10/17/24 10/22/24
iron) tablet (FeroSul)   
guanfacine 1 mg tablet,extended 1 mg PO .QHS 10/17/24 10/22/24
release 24 hr   
lamotrigine 100 mg tablet 100 mg PO DAILY 10/17/24 10/22/24
lisinopril 5 mg tablet 5 mg PO DAILY 10/17/24 10/22/24
prazosin 1 mg capsule 2 mg PO QPM 10/17/24 10/22/24
quetiapine 50 mg tablet 50 mg PO .QHS 10/17/24 10/22/24
sertraline 50 mg tablet 50 mg PO DAILY 10/17/24 10/22/24
sitagliptin phosphate 50 mg tablet 50 mg PO DAILY 10/17/24 10/22/24
(Januvia)   

Previous Rx's

?Medication ?Instructions ?Recorded
megestrol 20 mg tablet 20 mg PO BID bid for 2 days, then 10/18/24
 daily #30 tabs 
ketorolac 10 mg tablet 10 mg PO TID PRN pain #10 tabs 10/19/24
promethazine 25 mg tablet 25 mg PO Q6H PRN nausea and 10/19/24
 vomiting #12 tabs 


Allergies

Allergy/AdvReac Type Severity Reaction Status Date / Time
amoxicillin (From Augmentin) Allergy Severe Anaphylaxis Verified 10/22/24 21:36
clavulanic acid (From Allergy Severe Anaphylaxis Verified 10/22/24 21:36
Augmentin)     
Penicillins Allergy Severe Anaphylaxis Verified 10/22/24 21:36
bee venom protein (honey bee) Allergy  Anaphylaxis Verified 10/22/24 21:36
metformin Allergy  Nausea Verified 10/22/24 21:36
nickel Allergy  Hives Verified 10/22/24 21:36



Opioid HPI
Opioid Management
Most Recent Opioid Data: 
      Last Pain Scale 6 10/19/24 17:44 10/19/24
      Last Pain Assessment 10/18/24 10:00  
      Last MAR Pain Assessment 10/19/24 17:44  
      Last ORT Total Score 11 10/17/24 17:06 10/17/24
      Last ORT Risk Category High Risk 10/17/24 17:06 10/17/24


Review of Systems
ROS  
 Narrative A ten point review of systems is negative except as noted above.   

[facility]
Medical History (Updated 10/22/24 @ 22:54 by Jonathon Ramirez MD)

History of blood transfusion (10/17/18)
 ?Z92.89 - Personal history of other medical treatment (ICD-10)
Anemia
 ?D64.9 - Anemia, unspecified (ICD-10)
Night terror
 ?F51.4 - Sleep terrors [night terrors] (ICD-10)
Sleep paralysis
 ?G47.8 - Other sleep disorders (ICD-10)
Insomnia
 ?G47.00 - Insomnia, unspecified (ICD-10)
Panic attacks
 ?F41.0 - Panic disorder [episodic paroxysmal anxiety] (ICD-10)
Snores
 ?R06.83 - Snoring (ICD-10)
Syncope
 ?R55 - Syncope and collapse (ICD-10)
Metabolic syndrome
 ?E88.810 - Metabolic syndrome (ICD-10)
Dysfunctional uterine bleeding
 ?N93.8 - Other specified abnormal uterine and vaginal bleeding (ICD-10)
Vaginal bleeding
 ?N93.9 - Abnormal uterine and vaginal bleeding, unspecified (ICD-10)
Anemia requiring transfusions
 ?D64.9 - Anemia, unspecified (ICD-10)
PTSD (post-traumatic stress disorder)
 ?F43.10 - Post-traumatic stress disorder, unspecified (ICD-10)
Bipolar 1 disorder
 ?F31.9 - Bipolar disorder, unspecified (ICD-10)
Anxiety
 ?F41.9 - Anxiety disorder, unspecified (ICD-10)
Depression
 ?F32.A - Depression, unspecified (ICD-10)
PCOS (polycystic ovarian syndrome)
 ?E28.2 - Polycystic ovarian syndrome (ICD-10)
Insulin resistance
 ?E88.819 - Insulin resistance, unspecified (ICD-10)
Mastocytosis
 ?D47.09 - Other mast cell neoplasms of uncertain behavior (ICD-10)
POTS (postural orthostatic tachycardia syndrome)
 ?G90.A - Postural orthostatic tachycardia syndrome [POTS] (ICD-10)
Systemic mastocytosis
 ?D47.02 - Systemic mastocytosis (ICD-10)


Surgical History (Updated 10/22/24 @ 11:25 by Gay Duke NP)

History of myringotomy
 ?Z98.890 - Other specified postprocedural states (ICD-10)
H/O esophagogastroduodenoscopy
 ?Z98.890 - Other specified postprocedural states (ICD-10)
Hx of tonsillectomy
 ?Z90.89 - Acquired absence of other organs (ICD-10)


Family History (Updated 10/22/24 @ 11:25 by Gay Duke NP)
Grandmother
 Family history of hypertension
 Family history of CHF (congestive heart failure)
 Family history of COPD (chronic obstructive pulmonary disease)
Aunt
 Family history of cancer
 Family history of diabetes mellitus
Grandfather
 Family history of cancer
 Family history of diabetes mellitus
 Family history of hypertension
Uncle
 Family history of cancer
 Family history of diabetes mellitus
Father
 Family history of hypertension
Other
 Family history of DVT
 Family history of seizures



Social History (Updated 10/22/24 @ 11:18 by Gay Duke NP)
Within the past year, how often did you have a drink containing alcohol:  monthly or less 
Within the past year, how many standard drinks containing alcohol did you have on a typical day:  1 or 2 
Within the past year, how often did you have six or more drinks on one occasion:  never 
Total score:  0 
Score interpretation:  A score less than 3 is consistent with normal alcohol consumption. 
Do you use any of these nicotine containing products:  vaping products 
Non-prescribed substance use:  denies use and former substance user 
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 
Previous occupational history:  self employed 
Highest level of school completed/degree received:  some college, no degree 
Are you now , , , , never  or living with a partner:  living with partner 
Little interest or pleasure in doing things:  not at all 
Feeling down, depressed, or hopeless:  not at all 
Feel stressed/tense/nervous/anxious/difficulty sleeping:  only a little 
Gender Identity:  female 



Exam
Narrative
Exam Narrative: 
Nurses note and vital signs reviewed and patient is not hypoxic.

General:  The patient appears somewhat dyspneic.
Skin:  Warm, dry, no pallor noted.  There is no rash noted.
Head:  Normocephalic, atraumatic
Eye: Normal conjunctiva, no drainage
Ears, Nose, Mouth, and Throat: oral mucosa is moist. Nares patent. 
Cardiovascular:  Regular Rate and Rhythm, not tachycardic
Respiratory:  Patient is in no distress, no accessory muscle use, lungs are clear to auscultation, no wheezing, rales or rhonchi
Back:  non-tender
GI: Obese and nontender
Musculoskeletal: The patient has no evidence of calf tenderness, no pitting edema, symmetrical pulses noted bilaterally
Neurological:  A&O, normal speech
Psychiatric:  Cooperative
Constitutional
Vital Signs, click to edit/add: 

Last Vital Signs

Temp  99.1 F   10/22/24 21:30
Pulse  98 H  10/22/24 21:30
Resp  20   10/22/24 21:30
BP  140/98 H  10/22/24 21:30
Pulse Ox  99   10/22/24 21:30
O2 Del Method  Room Air  10/22/24 21:30




Course
Vital Signs
Vital signs: 

Vital Signs

Temperature  99.1 F   10/22/24 21:30
Pulse Rate  98 H  10/22/24 21:30
Respiratory Rate  20   10/22/24 21:30
Blood Pressure  140/98 H  10/22/24 21:30
Pulse Oximetry  99   10/22/24 21:30
Oxygen Delivery Method  Room Air  10/22/24 21:30



Temperature  99.1 F   10/22/24 21:30
Pulse Rate  98 H  10/22/24 21:30
Respiratory Rate  20   10/22/24 21:30
Blood Pressure  140/98 H  10/22/24 21:30
Pulse Oximetry  99   10/22/24 21:30
Oxygen Delivery Method  Room Air  10/22/24 21:30




Medical Decision Making
MDM Narrative
Medical decision making narrative: 
Hemoglobin is 8.7 which is only down slightly from 9.3 three days ago.  She is not pregnant.  I discussed the case with Dr. Patterson and the patient will be discharged home and she will have her D&C performed on Friday, October 25 as scheduled.  
Treatment diagnosis and follow-up were discussed with the patient and her family.
Differential Diagnosis
Differential Diagnosis: Dysfunctional uterine bleeding, anemia
Lab Data
Lab results reviewed: Yes I reviewed the patient's lab results
Labs: 

Lab Results

  10/22/24 Range/Units
  22:15 
WBC  14.5 H  (4.0-11.0)  10^3/uL
RBC  3.50 L  (4.20-5.40)  10^6/uL
Hgb  8.7 L  (12.0-16.0)  g/dL
Hct  28.2 L  (36.0-48.0)  %
MCV  80.6 L  (81.0-99.0)  fL
MCH  24.9 L  (26.7-34.0)  pg
MCHC  30.9  (29.9-35.2)  g/dL
RDW  16.5 H  (11.0-15.0)  %
Plt Count  289  (150-450)  10^3/uL
MPV  8.4 L  (9.5-13.5)  fL
Neut % (Auto)  74.1  (43.0-75.0)  %
Lymph % (Auto)  18.2 L  (20.5-60.0)  %
Mono % (Auto)  4.8  (1.7-12.0)  %
Eos % (Auto)  2.1  (0.9-7.0)  %
Baso % (Auto)  0.3  (0.2-2.0)  %
Neut # (Auto)  10.8 H  (1.4-6.5)  10^3/uL
Lymph # (Auto)  2.6  (1.2-3.8)  10^3/uL
Mono # (Auto)  0.7  (0.3-0.8)  10^3/uL
Eos # (Auto)  0.3  (0.0-0.7)  10^3/uL
Baso # (Auto)  0.1  (0.0-0.1)  10^3/uL
Abs Immat Gran (auto)  0.07 H  (0.00-0.03)  10^3/uL
Imm/Tot Granulo (auto)  0.5  (0.0-0.5)  %
PT  10.3  (9.0-11.6)  sec
INR  0.97  
APTT  23.9  (22.3-36.2)  sec
Sodium  141  (136-145)  mmol/L
Potassium  4.1  (3.5-5.1)  mmol/L
Chloride  107  ()  mmol/L
Carbon Dioxide  22.5  (21.0-32.0)  mmol/L
Anion Gap  15.6  
BUN  10.0  (7.0-18.0)  mg/dL
Creatinine  0.99  (0.55-1.02)  mg/dL
Est GFR ( Amer)  >60  (>=60 mL/min/1.73m^2)  
Est GFR (Non-Af Amer)  >60  (>=60 mL/min/1.73m^2)  
BUN/Creatinine Ratio  10.1  
Glucose  158 H  ()  mg/dL
Calcium  9.3  (8.5-10.1)  mg/dL
Serum HCG, Qual  Negative  (NEGATIVE)  



ECG Data
Attestation: I personally reviewed and interpreted this ECG as follows: (EKG on my interpretation shows sinus rhythm with rate of 90 and no acute change)

Discharge Plan
Discharge
Chief Complaint: Abdominal Pain

Clinical Impression:
 Dysfunctional uterine bleeding, Anemia


Patient Disposition: Home, Self-Care

Time of Disposition Decision: 22:54

Condition: Good

Mode of Transportation: Private Vehicle

Prescriptions / Home Meds:
No Action
  ketorolac 10 mg tablet 
   10 mg PO TID PRN (Reason: pain) Qty: 10 0RF
  promethazine 25 mg tablet 
   25 mg PO Q6H PRN (Reason: nausea and vomiting) Qty: 12 0RF
  atorvastatin 20 mg tablet 
   20 mg PO .QHS 
  (DME) blood-glucose meter [OneTouch Ultra2 Meter]  Misc 
     MISCELLANEOUS  
  (DME) OneTouch Ultra Test  Strip 
     MISCELLANEOUS  
  buspirone 10 mg tablet 
   10 mg PO BID 
  docusate sodium 100 mg capsule 
   100 mg PO .QHS PRN (Reason: constipation) 
  Slynd 4 mg (28) tablet 
   4 mg PO DAILY 
  ferrous sulfate [FeroSul] 325 mg (65 mg iron) tablet 
   325 mg PO DAILY 
  guanfacine 1 mg tablet extended release 24 hr 
   1 mg PO .QHS 
  lamotrigine 100 mg tablet 
   100 mg PO DAILY 
  lisinopril 5 mg tablet 
   5 mg PO DAILY 
  sertraline 50 mg tablet 
   50 mg PO DAILY 
  Januvia 50 mg tablet 
   50 mg PO DAILY 
  aripiprazole 5 mg tablet 
   5 mg PO QPM 
  prazosin 1 mg capsule 
   2 mg PO QPM 
  quetiapine 50 mg tablet 
   50 mg PO .QHS 
  megestrol 20 mg tablet 
   20 mg PO BID MDD bid for 2 days, then daily Qty: 30 11RF
   Rx Instructions:
   bid for 2 days, then daily

Print Language: English

Instructions:  Abnormal (Dysfunctional) Uterine Bleeding (ED), Anemia (ED)

Additional Instructions:
See Dr. Patterson on Friday for your procedure.

Referrals:
Jacquie Bacon NP [Primary Care Provider] - 1 week

## 2024-10-22 NOTE — XMS_ITS
Comprehensive CCD (C-CDA v2.1)  
  
                          Created on: 2024  
  
  
Linnette Beckham  
External Reference #: CDR,PersonID:[id number]  
: 2000  
Sex: Female  
  
Demographics  
  
  
                                        Address             [address] [place]  [address]  
   
                                        Mobile Phone        [phone]  
   
                                        Preferred Language  en  
   
                                        Marital Status      Single  
   
                                        [employer] Aff[employer] Unknown  
   
                                        Race                White  
   
                                        Ethnic Group        Not  or Lati  
no  
  
  
Author  
  
  
                                        Organization        [facility]  
  
  
Care Team Providers  
  
  
                                Care Team Member Name Role            Phone  
   
                                STANLEY NERI MD Unavailable     Unavailab  
le  
   
                                STANLEY NERI MD Unavailable     Unavailab  
le  
   
                                NONE            Unavailable     Unavailable  
   
                                ANYA OLIVARES Attending       Unavailable  
   
                                ANYA OLIVARES Consulting      Unavailable  
   
                                ANYA OLIVARES Admitting       Unavailable  
   
                                Doreen Cabrera CNP Primary Care Provider [phone]  
   
                                Deborah APRN - LARISSA Doreen M Primary Care Provider  
 [phone]  
   
                                MOLLY SALEH C Admitting       Unavailable  
   
                                MOLLY SALEH C Attending       Unavailable  
   
                                ISMAEL ORTEGA Referring       Unavailable  
   
                                DEBORAH, DOREEN M Primary Care    Unavailable  
   
                                MALIKA SHEPHERD Attending       Unavailable  
   
                                DEBORAH, DOREEN M Primary Care    Unavailable  
   
                                ISMAEL ORTEGA Attending       Unavailable  
   
                                DEBORAH, DOREEN M Primary Care    Unavailable  
   
                                RAFAEL GALLAGHER   Attending       Unavailable  
   
                                DEBORAH, DOREEN M Primary Care    Unavailable  
   
                                DEBORAH, DOREEN M Referring       Unavailable  
   
                                DEBORAH, DOREEN M Primary Care    Unavailable  
   
                                Deborah APRITZEL - LARISSA, Doreen M Primary Care Provider  
 [phone]  
   
                                DEBORAH, DOREEN M Referring       Unavailable  
   
                                DEBORAH, DOREEN M Primary Care    Unavailable  
   
                                Deborah Doreen DIAS Primary Care Provider [phone]  
   
                                DEBORAH, DOREEN M Primary Care    Unavailable  
   
                                ABRAHAM MARIE Attending       Unavailable  
   
                                ABRAHAM MARIE Attending       Unavailable  
   
                                ABRAHAM MARIE Referring       Unavailable  
   
                                DEBORAH, DOREEN M Primary Care    Unavailable  
   
                                Deborah LARISSA Doreen Unavailable     [phone]  
  
  
  
Allergies  
  
  
                                                    Allergy   
Classification                          Reported   
Allergen(s)               Allergy Type              Date of   
Onset                     Reaction(s)               Facility  
   
                                                    Amoxicillin /   
Clavulanate  
(6 sources)                             Amoxicillin /   
Clavulanate;   
Translations:   
[Augmentin   
500-125 MG Oral   
Tablet]                   Drug Allergy                                       [facility]  
   
                                                    ARIPiprazole  
(6 sources)                             ARIPiprazole;   
Translations:   
[Abilify]                 Drug Allergy                
1                         groggy                    Health   
[facility]  
   
                                                    metFORMIN  
(6 sources)                             metFORMIN;   
Translations:   
[metformin]               Drug Allergy                
1                                       Nausea,   
Vomiting                                Health   
[facility]  
   
                                                    Serotonin Reuptake   
Inhibitors (SSRIs)  
(7 sources)                             Sertraline;   
Translations:   
[trazodone   
hydrochloride]            Drug Allergy                
1                                       Panic attack,   
slow/groggy                             Health   
[facility]  
   
                                                    Unclassified  
(6 sources)                             Amoxicillin-Pot   
Clavulanate;   
Translations:   
[AMOXICILLIN-PO  
T CLAVULANATE]                          Propensity to   
adverse   
reactions to   
drug                                      
7                                                   [facility]  
   
                                                      
(1 source)                              Amoxicillin /   
Clavulanate               Drug Allergy                
4                                                   [facility]   
Repository  
   
                                                      
(20 sources)                            Amoxicillin /   
Clavulanate;   
Translations:   
[Augmentin   
500-125 MG Oral   
Tablet]                   Drug Allergy                
1                         Shock                     [facility]  
   
                                                      
(20 sources)                            ARIPiprazole;   
Translations:   
[Abilify]                 Drug Allergy                
1                         groggy                    [facility]  
   
                                                      
(20 sources)                            metFORMIN;   
Translations:   
[METFORMIN]               Drug Allergy                
0                                       Nausea,   
Vomiting                                Health   
[facility]  
   
                                                      
(15 sources)        Sertraline          Drug Allergy          
1                         slow/ groggy              Health   
[facility]  
   
                                                      
(4 sources)         traZODone           Drug Allergy        10-  
2                         Panic attack              Health   
[facility]  
   
                                                      
(1 source)                              nickel;   
Translations:   
[NICKEL]                  Drug Allergy                
7                                                   [facility]   
Repository  
   
                                                      
(1 source)                              CARBONIC   
ANHYDRASE   
INHIBITORS;   
Translations:   
[CARBONIC   
ANHYDRASE   
INHIBITORS]                             Propensity to   
adverse   
reactions to   
drug   
(disorder)                                
7                                                   [facility]   
Repository  
   
                                                      
(8 sources)         Sertraline          Drug Allergy          
4                         slow/groggy               Health   
[facility]  
   
                                                      
(8 sources)         traZODone           Drug Allergy          
4                         Panic attack              Health   
[facility]  
  
  
  
Medications  
Current Medications  
  
  
  
                      Medication Drug Class(es) Dates      Sig (Normalized) Sig   
(Original)  
   
                                                    Alcohol Pads 70%  
(7 sources)                                         Start:   
2024                                          Alcohol Pads 70%   
2024   
Provider: Doreen Cabrera CNP  
   
                                                    ARIPiprazole 5 mg   
oral tablet  
(20 sources)                            Atypical   
Antipsychotic                           Start:   
10-  
End:   
2024                                          ARIPiprazole 5 MG   
Oral Tablet   
2024   
Provider: Doreen Cabrera CNP  
   
                                                    atorvastatin 20 mg   
oral tablet  
(7 sources)                             HMG-CoA Reductase   
Inhibitor                               Start:   
2024                                          Atorvastatin   
Calcium 20 MG Oral   
Tablet 2024   
Provider: Doreen Cabrera CNP  
   
                                                    Blood Glucose System   
Sriram Kit  
(7 sources)                                         Start:   
2024                                          Blood Glucose   
System Sriram Kit   
2024   
Provider: Doreen Cabrera CNP  
   
                                                    busPIRone   
hydrochloride 10 mg   
oral tablet  
(20 sources)                                        Start:   
2024                                          busPIRone HCl 10   
MG Oral Tablet   
2024   
Provider: Doreen Cabrera CNP  
  
  
  
                                                    Start: 2023  
End: 2024                                     busPIRone HCl 5 MG Oral Tabl  
et 2024 - 2024   
Provider:   
Doreen Cabrera CNP  
  
  
  
                                                    docusate sodium 100   
mg oral capsule  
(5 sources)                     Start: 2024                 Colace 100 MG   
Oral   
Capsule 2024   
Provider: Doreen Cabrera CNP  
   
                                                    24 hr guanFACINE 1 mg   
extended release oral   
tablet  
(17 sources)                            Central alpha-2   
Adrenergic Agonist                      Start: 2024  
End: 2024                                     Intuniv 1 MG Oral   
Tablet Extended   
Release 24 Hour   
2024 Provider:   
Doreen Cabrera CNP  
   
                                                    hydrOXYzine pamoate   
50 mg oral capsule  
(2 sources)     Antihistamine   Start: 2021                 hydrOXYzine   
(VISTARIL) capsule 50   
mg  
  
  
  
                                                take 1 tablet by mouth once verito  
y hydrOXYzine (ATARAX) 10 MG tablet Take 10 mg   
by   
mouth nightly 0 Active  
  
  
  
                                                    ibuprofen 400 mg   
oral tablet  
(2 sources)                             Nonsteroidal   
Anti-inflammatory Drug                  Start:   
2021                              take 1 tablet   
by mouth   
every six   
hours as   
needed for   
pain                                    ibuprofen   
(ADVIL;MOTRIN) 400   
MG tablet Take 1   
tablet by mouth   
every 6 hours as   
needed for Pain   
120 tablet 0   
2021 Active  
   
                                                    Iron  
(5 sources)                                         Start:   
2024                                          [facility] Iron 325 (65   
Fe) MG Oral Tablet   
2024   
Provider: Doreen Cabrera CNP  
   
                                                    lisinopril 5 mg   
oral tablet  
(7 sources)                             Angiotensin Converting   
Enzyme Inhibitor                        Start:   
2024                                          Lisinopril 5 MG   
Oral Tablet   
2024   
Provider: Doreen   
Deborah CNP  
   
                                                    LORazepam 0.5 mg   
oral tablet  
(3 sources)         Benzodiazepine                          take 0.5 mg   
by mouth once   
daily as   
needed                                  LORazepam (ATIVAN   
PO) Take 0.5 mg by   
mouth daily as   
needed 0 Active  
   
                                                    meclizine   
hydrochloride 25   
mg oral tablet  
(3 sources)               Antiemetic                Start:   
2017                              take 1 tablet   
by mouth   
three times   
daily as   
needed for   
dizziness                               meclizine   
(ANTIVERT) 25 MG   
tablet Take 1   
tablet by mouth 3   
times daily as   
needed for   
Dizziness 30   
tablet 0   
2017 Active  
   
                                                    naloxone   
hydrochloride 40   
mg/ml nasal spray  
(20 sources)              Opioid Antagonist         Start:   
2021  
End:   
2021                                          Narcan 4 MG/0.1ML   
Nasal Liquid   
2021   
Provider: Doreen Cabrera CNP  
   
                                                    Naproxen  
(3 sources)                             Nonsteroidal   
Anti-inflammatory Drug                                         Naproxen Sodium   
(ALEVE PO) Take 1   
tablet by mouth as   
needed 0 Active  
   
                                                    polyethylene   
glycol 3350 [id number]   
mg powder for oral   
solution  
(20 sources)              Osmotic Laxative          Start:   
2021                                          MiraLax 17   
GM/SCOOP Oral   
Powder 2021   
Provider: Doreen Cabrera CNP  
   
                                                    QUEtiapine 50 mg   
oral tablet  
(5 sources)               Atypical Antipsychotic    Start:   
2024                                          SEROquel 50 MG   
Oral Tablet   
2024   
Provider: Doreen Cabrera CNP  
   
                                                    SITagliptin 50 mg   
oral tablet  
(10 sources)                            Dipeptidyl Peptidase 4   
Inhibitor                               Start:   
2024                                          Januvia 50 MG Oral   
Tablet 2024   
Provider: Doreen Cabrera CNP  
  
  
  
                                                take 500 mg by mouth once daily   
SITagliptin Phosphate (JANUVIA PO) Take 500 mg   
by   
mouth daily 0 Active  
  
  
  
                                                    topiramate 25 mg oral   
tablet  
(3 sources)                                                 take 25 mg by mouth   
twice daily                             Topiramate (TOPAMAX PO)   
Take 25 mg by mouth 2   
times daily 0 Active  
   
                                                    {24 (drospirenone 4 MG   
Oral Tablet) / 4 (inert   
ingredients 1 MG Oral   
Tablet) } Pack [Slynd]  
(7 sources)                                         Start:   
2024                                          Slynd 4 MG Oral Tablet   
2024 Provider:   
Doreen Cabrera CNP  
  
  
  
Completed/Discontinued Medications  
  
  
  
                      Medication Drug Class(es) Dates      Sig (Normalized) Sig   
(Original)  
   
                                                    ALPRAZolam 0.5 mg oral   
tablet  
(1 source)                Benzodiazepine            Start:   
2021  
End:   
2021                                          ALPRAZolam   
(XANAX) tablet   
0.5 mg  
   
                                                    azithromycin 250 mg oral   
tablet  
(20 sources)                            Macrolide   
Antimicrobial                           Start:   
2021  
End:   
2021                                          Azithromycin 250   
MG Oral Tablet   
2021 -   
2021   
Provider:  
   
                                                    brompheniramine maleate   
0.4 mg/ml /   
dextromethorphan   
hydrobromide 2 mg/ml /   
pseudoephedrine   
hydrochloride 6 mg/ml   
oral solution  
(20 sources)                            alpha-Adrenergic   
Agonist,   
Uncompetitive   
N-methyl-D-aspartat  
e Receptor   
Antagonist, Sigma-1   
Agonist                                 Start:   
2021  
End:   
2021                                          Pseudoeph-Bromph  
en-DM 30-2-10   
MG/5ML Oral   
Syrup 2021   
- 2021   
Provider:  
   
                                                    escitalopram 5 mg oral   
tablet  
(20 sources)                            Serotonin Reuptake   
Inhibitor                               Start:   
2021  
End:   
10-                                          Lexapro 5 MG   
Oral Tablet   
2021 -   
2021   
Provider:  
   
                                                    hydroCHLOROthiazide 25   
mg / spironolactone 25   
mg oral tablet  
(20 sources)                            Thiazide Diuretic,   
Aldosterone   
Antagonist                              Start:   
2023  
End:   
2023                                          Spironolactone-H  
CTZ 25-25 MG   
Oral Tablet   
2023 -   
2023   
Provider: Doreen Cabrera CNP  
  
  
  
                                                    Start: 2021  
End: 2023                                     Aldactazide 50-50 MG Oral Ta  
blet 06/10/2021 - 2021   
Provider: Doreen Cabrera CNP  
  
  
  
                                                    hydrocortisone 10   
mg/ml / neomycin 3.5   
mg/ml / polymyxin b   
15187 unt/ml otic   
suspension  
(20 sources)                            Aminoglycoside   
Antibacterial,   
Polymyxin-class   
Antibacterial,   
Corticosteroid                          Start:   
2021  
End: 2021                                     Neomycin-Polymyxin-HC   
[id number] Otic   
Suspension 2021 -   
2021 Provider: Doreen Cabrera CNP  
   
                                                    ketorolac   
tromethamine 10 mg   
oral tablet  
(20 sources)                            Nonsteroidal   
Anti-inflammatory   
Drug, Cyclooxygenase   
Inhibitor                               Start:   
2022  
End: 2022                                     Ketorolac Tromethamine 10   
MG Oral Tablet 2022   
- 2022 Provider:   
Julieth Pisano CNP  
   
                                                    lamoTRIgine 100 mg   
oral tablet  
(20 sources)                            Mood Stabilizer,   
Anti-epileptic Agent                    Start:   
2024  
End: 2024                                     lamoTRIgine 100 MG Oral   
Tablet 2024 -   
2024 Provider: Doreen Cabrera CNP  
   
                                                    metFORMIN   
hydrochloride 500 mg   
oral tablet  
(20 sources)              Biguanide                 Start:   
07-  
End: 2024                                     metFORMIN HCl 500 MG Oral   
Tablet 07/15/2023 -   
2024 Provider:  
  
  
  
                                                    Start: 2021  
End: 10-                         take 1 tablet by mouth every   
twenty-four hours                       metFORMIN HCl  MG Oral   
Tablet Extended Release 24 Hour   
2021 - 10/21/2022 Provider:   
Doreen Cabrera CNP  
  
  
  
                                                    methylPREDNISolone 4 mg   
oral tablet  
(20 sources)              Corticosteroid            Start:   
2021  
End: 2021                                     methylPREDNISolone 4 MG   
Oral Tablet Therapy   
Pack 2021 -   
2021 Provider:  
   
                                                    prazosin 1 mg oral   
capsule  
(20 sources)                            alpha-Adrenergic   
Blocker                                 Start:   
2023  
End: 2024                                     Prazosin HCl 1 MG Oral   
Capsule 2024 -   
2024 Provider:   
Doreen Cabrera CNP  
  
  
  
                                                    Start: 2022  
End: 2023                                     Prazosin HCl 1 MG Oral Capsu  
le   
2022 - 2023 Provider:   
Doreen Cabrera CNP  
   
                                        Start: 2021   take 3 capsules by m  
outh once   
daily                                   prazosin (MINIPRESS) 1 MG capsule   
Take 3 capsules by mouth nightly   
90 capsule 0 2021 Active  
   
                                                    Start: 2021  
End: 10-                                     Prazosin HCl 1 MG Oral Capsu  
le   
06/10/2021 - 2021 Provider:   
Doreen Cabrera CNP  
  
  
  
                                                    promethazine   
hydrochloride 12.5 mg   
oral tablet  
(20 sources)              Phenothiazine             Start: 2022  
End: 2023                                     Promethazine HCl 12.5   
MG Oral Tablet   
2022 -   
2023 Provider:   
Julieth Pisano CNP  
   
                                                    sertraline 50 mg oral   
tablet  
(20 sources)                            Serotonin Reuptake   
Inhibitor                               Start: 2024  
End: 2024                                     Sertraline HCl 50 MG   
Oral Tablet   
2024 -   
2024 Provider:   
Doreen Cabrera CNP  
  
  
  
                                                    Start: 2021  
End: 2021                                     Zoloft 50 MG Oral Tablet 2021 - 2021 Provider:  
  
  
  
                                                    traZODone hydrochloride   
100 mg oral tablet  
(20 sources)                            Serotonin Reuptake   
Inhibitor                               Start: 2023  
End: 2024                                     traZODone HCl 100 MG   
Oral Tablet   
2024 -   
2024 Provider:   
Doreen Cabrera CNP  
  
  
  
                                                    Start: 2021  
End: 10-                                     traZODone HCl 50 MG Oral Tab  
let 2021 - 2021   
Provider:  
  
  
  
                                                    ziprasidone 80 mg oral   
capsule  
(20 sources)              Atypical Antipsychotic    Start: 2021  
End: 10-                                     Geodon 80 MG Oral   
Capsule 2021 -   
2021 Provider:  
  
  
  
                                                    Start: 2021  
End: 2021                                     Geodon 20 MG Oral Capsule   
2021 - 2021 Provider:  
   
                                                            take 4 capsules by m  
outh once   
daily                                   ziprasidone (GEODON) 20 MG capsule   
Take 80 mg by mouth nightly 0   
Active  
  
  
  
Problems  
Active Problems  
  
  
                                                    Problem   
Classification  Problem         Date            Documented Date Episodic/Chronic  
   
                                                    Anxiety disorders  
(20 sources)                            Posttraumatic stress   
disorder;   
Translations:   
[Post-traumatic stress   
disorder, unspecified]                  Onset:   
2021                                          Chronic  
   
                                                    Attention-deficit,   
conduct, and   
disruptive behavior   
disorders  
(20 sources)                            Attention deficit   
hyperactivity   
disorder;   
Translations:   
[Attention-deficit   
hyperactivity   
disorder, unspecified   
type]                                   Onset:   
2024                Chronic  
   
                                                    Attention-deficit,   
conduct, and   
disruptive behavior   
disorders  
(12 sources)                            Undifferentiated   
attention deficit   
disorder;   
Translations:   
[Attention deficit   
disorder with   
hyperactivity]                          Onset:   
2024                                          Chronic  
   
                                                    Deficiency and other   
anemia  
(3 sources)                             Iron deficiency   
anemia; Translations:   
[Iron deficiency   
anemia, unspecified]                    Onset:   
10-                                          Episodic  
   
                                                    Diabetes mellitus   
without complication  
(19 sources)                            Type 2 diabetes   
mellitus without   
complication;   
Translations: [Type 2   
diabetes mellitus   
without complications]                  Onset:   
2024                Chronic  
   
                                                    Disorders of teeth   
and jaw  
(5 sources)                             Dental caries;   
Translations: [Dental   
caries, unspecified]                    Onset:   
2024                                          Episodic  
   
                                                    Essential   
hypertension  
(16 sources)                            Hypertensive disorder;   
Translations:   
[Unspecified essential   
hypertension]                           Onset:   
2021                                          Chronic  
   
                                                    Immunizations and   
screening for   
infectious disease  
(20 sources)                            Contact with and   
(suspected) exposure   
to other viral   
communicable diseases;   
Translations: [HIV   
screening]                              Onset:   
08-                                          Episodic  
   
                                                    Impulse control   
disorders, NEC  
(1 source)                              Homicidal thoughts;   
Translations:   
[Homicidal ideations]                                         Episodic  
   
                                                    Menstrual disorders  
(14 sources)                            Dysmenorrhea;   
Translations:   
[Dysmenorrhea,   
unspecified]                            Onset:   
2024                Chronic  
   
                                                    Mood disorders  
(20 sources)                            Depressive disorder;   
Translations: [Major   
depressive disorder,   
single episode,   
unspecified]                            Onset:   
2021                                          Chronic  
   
                                                    Nonspecific chest   
pain  
(3 sources)                             Other chest pain;   
Translations: [Chest   
pain]                                   Onset:   
2024                                          Episodic  
   
                                                    Other ear and sense   
organ disorders  
(7 sources)                             Otitis externa;   
Translations:   
[Infective otitis   
externa, unspecified]                   Onset:   
2021                                          Chronic  
   
                                                    Other lower   
respiratory disease  
(6 sources)                             Cough; Translations:   
[Cough]                                 Onset:   
2022                                          Episodic  
   
                                                    Other nutritional;   
endocrine; and   
metabolic disorders  
(20 sources)                            Finding of body mass   
index; Translations:   
[Body mass index   
(observable entity)]                    Onset:   
2021                                          Chronic  
   
                                                    Other nutritional;   
endocrine; and   
metabolic disorders  
(20 sources)                            Morbid obesity;   
Translations: [Morbid   
obesity]                                Onset:   
2021                                          Chronic  
   
                                                    Personality   
disorders  
(20 sources)                            Borderline personality   
disorder;   
Translations:   
[Borderline   
personality disorder]                   Onset:   
2021  
Resolved:   
2024                                          Chronic  
   
                                                    Substance-related   
disorders  
(20 sources)                            Tobacco dependence   
syndrome;   
Translations:   
[Nicotine dependence,   
unspecified,   
uncomplicated]                          Onset:   
2021  
Resolved:   
2023                                          Chronic  
  
  
Past or Other Problems  
  
  
                      Problem Classification Problem    Date       Documented Da  
te Episodic/Chronic  
   
                                                    Cardiac dysrhythmias  
(10 sources)                            Tachycardia;   
Translations:   
[Tachycardia,   
unspecified]                            Onset:   
2021                                          Episodic  
   
                                                    Conditions associated   
with dizziness or   
vertigo  
(5 sources)                             Dizziness;   
Translations:   
[Dizziness and   
giddiness]                              Onset:   
2017                Episodic  
   
                                                    Deficiency and other   
anemia  
(9 sources)                             Anemia;   
Translations:   
[Anemia,   
unspecified]                            Onset:   
2024                                          Episodic  
   
                                                    Other screening for   
suspected conditions   
(not mental disorders   
or infectious disease)  
(20 sources)                            Encounter for   
screening for   
diabetes mellitus;   
Translations:   
[Diabetes Risk Test   
Score]                                  Onset:   
2021                                          Episodic  
   
                                                    Otitis media and   
related conditions  
(5 sources)                             Acute suppurative   
otitis media   
without spontaneous   
rupture of ear   
drum; Translations:   
[Acute suppurative   
otitis media   
without spontaneous   
rupture of ear   
drum, right ear]                        Onset:   
2017                Episodic  
  
  
  
Results  
  
  
                          Test Name    Value        Interpretation Reference   
Range                                   Facility  
   
                                                    Laboratory - Chemistry and C  
hemistry - challengeon 2024   
   
                      Ferritin [Mass/Vol] 118 ng/mL             ( )  Healt  
h   
[facility]  
   
                      Free T3 [Mass/Vol] 3.2 pg/mL             (2.0-4.4 ) [facility]  
   
                          Free T4 [Mass/Vol] 1.13 ng/dL                (0.82-1.7  
7   
)                                       [facility]  
   
                      Iron [Mass/Vol] 52 ug/dL              ( )  [facility]  
   
                                                    Iron binding capacity   
[Mass/Vol]      396 ug/dL                       (250-450 )      [facility]  
   
                                                    Iron binding   
capacity.unsaturated   
[Mass/Vol]      344 ug/dL                       (131-425 )      [facility]  
   
                                                    Iron saturation [Mass   
fraction]       13 %            Low             (15-55 )        [facility]  
   
                          TSH Qn       2.930 uIU/mL              (0.450-4.50  
0 )                                     [facility]  
   
                                                    Laboratory - Hematology and   
Cell countson 2024   
   
                      Basophils (Bld) [#/Vol] 0.1 10*3/uL            (0.0-0.2 )   
[facility]  
   
                          Basophils/100 WBC (Bld) 0 %                       (Not  
 Estab.   
)                                       [facility]  
   
                                                    Eosinophils (Bld)   
[#/Vol]         0.2 10*3/uL                     (0.0-0.4 )      [facility]  
   
                                                    Eosinophils/100 WBC   
(Bld)               1 %                                     (Not Estab.   
)                                       [facility]  
   
                                                    Erythrocyte   
distribution width   
(RBC) [Ratio]       15.7 %              High                (11.7-15.4   
)                                       [facility]  
   
                                                    Hematocrit (Bld)   
[Volume fraction]   41.3 %                                  (34.0-46.6   
)                                       [facility]   
[facility]   
[facility]  
   
                                                    Hemoglobin (Bld)   
[Mass/Vol]          12.9 g/dL                               (11.1-15.9   
)                                       [facility]   
[facility]   
[facility]  
   
                                                    Immature granulocytes   
(Bld) [#/Vol]   0.1 10*3/uL                     (0.0-0.1 )      [facility]   
[facility]   
[facility]  
   
                                                    Immature   
granulocytes/100 WBC   
(Bld)               1 %                                     (Not Estab.   
)                                       [facility]   
[facility]   
[facility]  
   
                                                    Lymphocytes (Bld)   
[#/Vol]         2.4 10*3/uL                     (0.7-3.1 )      [facility]   
[facility]   
[facility]  
   
                                                    Lymphocytes/100 WBC   
(Bld)               18 %                                    (Not Estab.   
)                                       [facility]   
[facility]   
[facility]  
   
                                                    MCH (RBC) [Entitic   
mass]               24.3 pg             Low                 (26.6-33.0   
)                                       [facility]   
[facility]   
[facility]  
   
                          MCHC (RBC) [Mass/Vol] 31.2 g/dL    Low          (31.5-  
35.7   
)                                       [facility]  
   
                      MCV (RBC) [Entitic vol] 78 fL      Low        (79-97 )   H  
ealt   
[facility]   
[facility]  
   
                      Monocytes (Bld) [#/Vol] 0.6 10*3/uL            (0.1-0.9 )   
[facility]   
[facility]   
[facility]  
   
                          Monocytes/100 WBC (Bld) 5 %                       (Not  
 Estab.   
)                                       [facility]  
   
                                                    Morphology Gagandeep (Bld)   
[Interp]        NP                                              [facility]  
   
                                                    Neutrophils (Bld)   
[#/Vol]         9.7 10*3/uL     High            (1.4-7.0 )      [facility]  
   
                                                    Neutrophils/100 WBC   
(Bld)               75 %                                    (Not Estab.   
)                                       [facility]  
   
                                                    Nucleated RBC/100 WBC   
(Bld) [Ratio]   NP                                              [facility]   
[facility]   
[facility]  
   
                      Platelets (Bld) [#/Vol] 308 10*3/uL            (150-450 )   
Health   
[facility]   
[facility]  
   
                          RBC (Bld) [#/Vol] 5.31 10*6/uL High         (3.77-5.28  
   
)                                       [facility]  
   
                      WBC (Bld) [#/Vol] 13.1 10*3/uL High       (3.4-10.8 ) [facility]  
   
                                                    Laboratory - Serology - non-  
microon 2024   
   
                      Thyroglobulin Ab Qn [IU]/mL               (0.0-0.9 ) Healt  
[facility]   
[place]  
   
                                        Comment on above:   Note: Thyroglobulin   
Antibody measured by [facility]   
[facility] .It should be noted that the presence of   
thyroglobulinantibodies may not be pathogenic nor diagnostic,   
especiallyat very low levels. The assay  has found   
thatfour percent of individuals without evidence of   
thyroiddisease or autoimmunity will have positive TgAb levels   
upto 4 IU/mL.   
   
                      TPO Ab Qn  14 [IU]/mL            (0-34 )    [facility]  
   
                                                    No Panel Informationon    
   
                      Immature Cells NP                               [facility]  
   
                      Reported Physicians See Note                         [facility]  
   
                                        Comment on above:   Note: Reported Physi  
cians:Ordering: Doreen Cabrera   
   
                                                    Troponin I.cardiac High sens  
itivity method [Mass/Vol]on 2024   
   
                                                    1 HOUR TROP I, HIGH   
SENSITIVITY     <2              Normal          <16             [facility]  
   
                                        Comment on above:   Performed By: #### 8  
9579-7 ####  
[facility] ([id number])  
[address] [place] [address]   
   
                                                    BASIC METABOLIC PANLon    
   
                      Anion gap [Moles/Vol] 7 mmol/L   Normal     5-15       [facility]  
   
                                        Comment on above:   Performed By: #### C  
BCA, BMP, 64022-6, 82028-0, 12328-2 ####  
[facility] ([id number])  
[address] [place] [address]   
   
                      Calcium [Mass/Vol] 8.9 mg/dL  Normal     8.5-10.5   [facility]  
   
                                        Comment on above:   Performed By: #### C  
BCA, BMP, 75137-7, 32435-2, 35500-1 ####  
[facility] ([id number])  
[address] [place] [address]   
   
                      Chloride [Moles/Vol] 101 mmol/L Normal          [facility]  
   
                                        Comment on above:   Performed By: #### C  
BCA, BMP, 01304-3, 92965-4, 19630-1 ####  
[facility] ([id number])  
[address] [place] [address]   
   
                      CO2 [Moles/Vol] 28 mmol/L  Normal     22-32      [facility]  
   
                                        Comment on above:   Performed By: #### C  
REBECCA, BMP, 38919-3, 43817-9, 75058-4 ####  
[facility] ([id number])  
[address] [place] [address]   
   
                      Creatinine [Mass/Vol] 0.91 mg/dL Normal     0.40-1.00  [facility]  
   
                                        Comment on above:   Result Comment: METH  
OD TRACEABLE TO IDMS STANDARD   
   
                                                            Performed By: #### C  
REBECCA, BISHOP, , 29999-5, 86356-7 ####  
[facility] ([id number])  
[address] [place] [address]   
   
                                                    eGFR (CKD-EPI) NON-RACE   
DEPENDENT       >90             Normal          >59             [facility]  
   
                                        Comment on above:   Result Comment:  
Reported eGFR is based on the  
CKD-EPI  equation that does  
not use a race coefficient.   
   
                                                            Performed By: #### C  
REBECCA, BISHOP, , 17126-5, 48257-8 ####  
[facility] ([id number])  
[address] [place] [address]   
   
                      Glucose [Mass/Vol] 107 mg/dL  High       65-99      [facility]  
   
                                        Comment on above:   Performed By: #### C  
REBECCA, BMP, , 98951-7, 51988-6 ####  
[facility] ([id number])  
[address] [place] [address]   
   
                      Potassium [Moles/Vol] 3.5 mmol/L Normal     3.5-5.0    [facility]  
   
                                        Comment on above:   Performed By: #### C  
REBECCA, BMP, 49250-4, 24101-1, 51337-9 ####  
[facility] ([id number])  
[address] [place] [address]   
   
                      Sodium [Moles/Vol] 136 mmol/L Normal     134-146    [facility]  
   
                                        Comment on above:   Performed By: #### C  
BCA, BMP, 75600-9, 08322-7, 36430-7 ####  
[facility] ([id number])  
[address] [place] [address]   
   
                                                    Urea nitrogen   
[Mass/Vol]      10 mg/dL        Normal          5-23            [facility]  
   
                                        Comment on above:   Performed By: #### C  
BCA, BMP, , 82841-6, 89363-5 ####  
[facility] ([id number])  
[address] [place] [address]   
   
                                                    CBC AND AUTO DIFFon 20  
24   
   
                      ABSOLUTE BASOPHIL 0.2 X10E9/L Normal     0.0-0.2    [facility]  
   
                                        Comment on above:   Performed By: #### C  
BCA, BMP, , 13047-5, 46662-7 ####  
[facility] ([id number])  
[address] [place] [address]   
   
                      ABSOLUTE NEUTROPHIL 10.7 X10E9/L High       1.5-6.6    [facility]  
   
                                        Comment on above:   Performed By: #### C  
BCA, BMP, , 41751-2, 22080-2 ####  
[facility] ([id number])  
[address] [place] [address]   
   
                      Basophils/100 WBC (Bld) 1.1 %      Normal                [facility]  
   
                                        Comment on above:   Performed By: #### C  
BCA, BMP, , 49449-8, 53514-0 ####  
[facility] ([id number])  
[address] [place] [address]   
   
                                                    Eosinophils (Bld)   
[#/Vol]         0.2 10*3/uL     Normal          0.0-0.4         [facility]  
   
                                        Comment on above:   Performed By: #### C  
BCA, BMP, , 17076-8, 33634-8 ####  
[facility] ([id number])  
[address] [place] [address]   
   
                                                    Eosinophils/100 WBC   
(Bld)           1.3 %           Normal                          [facility]  
   
                                        Comment on above:   Performed By: #### C  
BCA, BMP, 53910-7, 89061-4, 11772-8 ####  
[facility] ([id number])  
[address] [place] [address]   
   
                                                    Erythrocyte   
distribution width   
(RBC) [Ratio]   19.4 %          High            11.5-15.0       [facility]  
   
                                        Comment on above:   Performed By: #### C  
BCA, BMP, 64350-6, 59704-2, 74388-3 ####  
[facility] ([id number])  
[address] [place] [address]   
   
                                                    Hematocrit (Bld)   
[Volume fraction] 33.5 %          Low             35-47           [facility]  
   
                                        Comment on above:   Performed By: #### C  
BCA, BMP, , 64781-9, 77247-9 ####  
[facility] ([id number])  
[address] [place] [address]   
   
                                                    Hemoglobin (Bld)   
[Mass/Vol]      10.6 g/dL       Low             11.7-15.5       [facility]  
   
                                        Comment on above:   Performed By: #### C  
BCA, BMP, , 83845-7, 99681-5 ####  
[facility] ([id number])  
[address] [place] [address]   
   
                                                    Lymphocytes (Bld)   
[#/Vol]         2.4 10*3/uL     Normal          1.0-3.5         [facility]  
   
                                        Comment on above:   Performed By: #### C  
BCA, BMP, 33686-4, 80678-3, 52036-9 ####  
[facility] ([id number])  
[address] [place] [address]   
   
                                                    Lymphocytes/100 WBC   
(Bld)           17.2 %          Normal                          [facility]  
   
                                        Comment on above:   Performed By: #### C  
BCA, BMP, 60175-7, 18133-9, 62196-1 ####  
[facility] ([id number])  
[address] [place] [address]   
   
                                                    MCH (RBC) [Entitic   
mass]           21.7 pg         Low             27-34           [facility]  
   
                                        Comment on above:   Performed By: #### C  
BCA, BMP, 35488-0, 31342-3, 94390-7 ####  
[facility] ([id number])  
[address] [place] [address]   
   
                      MCHC (RBC) [Mass/Vol] 31.6 g/dL  Low        32-36      [facility]  
   
                                        Comment on above:   Performed By: #### DAVID  
BCA, BMP, 31369-4, 08245-8, 61271-4 ####  
[facility] ([id number])  
[address] [place] [address]   
   
                      MCV (RBC) [Entitic vol] 69 fL      Low             P  
[facility]  
   
                                        Comment on above:   Performed By: #### C  
BCA, BMP, , 05111-2, 60249-3 ####  
[facility] ([id number])  
[address] [place] [address]   
   
                      Monocytes (Bld) [#/Vol] 0.7 10*3/uL Normal     0-0.9        
[facility]  
   
                                        Comment on above:   Performed By: #### C  
BCA, BMP, 82797-6, 94003-5, 45399-1 ####  
[facility] ([id number])  
[address] [place] [address]   
   
                      Monocytes/100 WBC (Bld) 4.8 %      Normal                P  
[facility]  
   
                                        Comment on above:   Performed By: #### DAVID  
BCA, BMP, 71396-0, 37405-3, 82999-0 ####  
[facility] ([id number])  
[address] [place] [address]   
   
                                                    Neutrophils/100 WBC   
(Bld)           75.6 %          Normal                          [facility]  
   
                                        Comment on above:   Performed By: #### C  
REBECCA, BMP, 51709-7, 34835-0, 04387-9 ####  
[facility] ([id number])  
[address] [place] [address]   
   
                                                    Platelet mean volume   
(Bld) [Entitic vol] 6.6 fL          Low             7-12            [facility]  
   
                                        Comment on above:   Performed By: #### DAVID FERNANDEZ, BMP, 87172-9, 13485-0, 30932-3 ####  
[facility] ([id number])  
[address] [place] [address]   
   
                      Platelets (Bld) [#/Vol] 333 10*3/uL Normal     150-450      
[facility]  
   
                                        Comment on above:   Performed By: #### DAVID FERNANDEZ, BMP, 55606-7, 42111-8, 98523-5 ####  
[facility] ([id number])  
[address] [place] [address]   
   
                      RBC COUNT  4.88 X10E12/L Normal     3.80-5.20  [facility]  
   
                                        Comment on above:   Performed By: #### DAVID FERNANDEZ, BMP, 33171-6, 53346-3, 52256-0 ####  
[facility] ([id number])  
[address] [place] [address]   
   
                                                    RBC morphology finding   
Nom (Bld)       REVIEWED        Normal                          [facility]  
   
                                        Comment on above:   Performed By: #### DAVID  
BCA, BMP, 22765-3, 39885-3, 62535-3 ####  
[facility] ([id number])  
[address] [place] [address]   
   
                      WBC (Bld) [#/Vol] 14.1 10*3/uL High       4.0-11.0   [facility]  
   
                                        Comment on above:   Performed By: #### DAVID FERNANDEZ, BMP, 82211-5, 48075-3, 17982-2 ####  
[facility] ([id number])  
[address] [place] [address]   
   
                                                    Fibrin D-dimer DDU (PPP) [Ma  
ss/Vol]on 2024   
   
                      D DIMER    <150       Normal     <255       [facility]  
   
                                        Comment on above:   Result Comment:  
Results <255 ng/mL DDU: The presence of a  
VTE can safely be excluded with a negative  
D-Dimer result and Wells score. A negative  
result doesn't exclude the possibility of DIC.  
The test be repeated along with other  
diagnostic tests if the patient's symptoms  
persist or worsen.  
[email or web address]  
[email or web address]   
   
                                                            Performed By: #### C  
REBECCA BMP, , 63438-0, 75333-8 ####  
[facility] ([id number])  
[address] [place] [address]   
   
                                                    MAGNESIUMon 2024   
   
                      Magnesium [Mass/Vol] 1.8 mg/dL  Normal     1.8-2.6    [facility]  
   
                                        Comment on above:   Performed By: #### C  
REBECCA BMP, , 79332-7, 02064-7 ####  
[facility] ([id number])  
[address] [place] [address]   
   
                                                    Troponin I.cardiac High sens  
itivity method [Mass/Vol]on 2024   
   
                                                    TROPONIN I, HIGH   
SENSITIVITY     <2              Normal          <16             [facility]  
   
                                        Comment on above:   Performed By: #### C  
REBECCA, BMP, , 75712-8, 79889-0 ####  
[facility] ([id number])  
[address] [place] [address]   
   
                                                    XR CHEST 2 VWSon 2024   
   
                                        XR CHEST 2 VWS      XR CHEST 2 VWS  
XR CHEST 2 VWS  
History: . chest   
discomfort.  
Comparison: 2020  
Impression:  
No acute pulmonary   
process. No pneumothorax   
or pleural effusion.  
Nonenlarged heart.  
Workstation:[id number]  
Finalized by Dejon Khan MD on 2024   
9:49 PM             Normal                                  [facility]  
   
                                                    Chlamydia/GC DNA, Uron 10-24  
-2022   
   
                      Chlamydia Probe, Ur Negative   Normal     NEG        [facility]  
   
                                        Comment on above:   Result Comment: CHLA  
MYDIA TRACHOMATIS DNA not detected by   
nucleic acid amplification.  
This test is intended for medical purposes only and is not   
valid for the  
evaluation of suspected sexual abuse or for other forensic   
purposes.  
In certain contexts, culture may be required to meet   
applicable laws and  
regulations for diagnosis of C. trachomatis and N. gonorrhoeae   
infections.  
Per 2014 CDC recommendations, this test does not include   
confirmation of  
positive results by an alternative nucleic acid target.   
   
                                                            Performed By: #### T  
RCMOL, UC ####  
[facility]  
[address] [place], [place] [address] [phone]  
: Carson Santizo MD   
   
                      Gonorrhea Probe, Ur Negative   Normal     NEG        [facility]  
   
                                        Comment on above:   Result Comment: NEIS  
SERIA GONORRHOEAE DNA not detected by   
nucleic acid amplification.  
This test is intended for medical purposes only and is not   
valid for the  
evaluation of suspected sexual abuse or for other forensic   
purposes.  
In certain contexts, culture may be required to meet   
applicable laws and  
regulations for diagnosis of C. trachomatis and N. gonorrhoeae   
infections.  
Per 2014 CDC recommendations, this test does not include   
confirmation of  
positive results by an alternative nucleic acid target.   
   
                                                            Performed By: #### T  
RCMOL, UC ####  
[facility]  
[address] [place], [place] [address] [phone]  
: Carson Santizo MD   
   
                                                    Trich Vag, Molecularon 10-22  
-2022   
   
                      Trich Vag, Molecular Negative   Normal     NEG        [facility]  
   
                                        Comment on above:   Result Comment: T. v  
aginalis DNA not detected  
Results should be interpreted in conjunction with other   
clinical data.  
This test is intended for medical purposes only and is not   
valid for the  
evaluation of suspected sexual abuse or for other forensic   
purposes.  
This test has not been evaluated in pregnant women or in   
patients less than 16  
years of age.   
   
                                                            Performed By: #### T  
RCMOL, UCGP ####  
[facility]  
[address] [place] [address]  
[phone]  
: Carson Santizo MD   
   
                      Source:    .URINE     Normal                [facility]  
   
                                        Comment on above:   Performed By: #### T  
RCMOL, UCGP ####  
[facility]  
[address] [place] [address]  
[phone]  
: Carson Santizo MD   
   
                                                    Acetaminophenon 2021   
   
                                                    Acetaminophen   
[Mass/Vol]      ug/mL           Low             10-30           [facility]  
   
                                        Comment on above:   Performed By: #### C  
OVRB ####  
[facility]  
[address] [place] [address] [phone]  
: Haresh Zimmer MD   
   
                                                    Acetaminophen LevelOrdered B  
y: Seth Greteljanice on 2021   
   
                          Acetaminophen Level <5           Low          10 - 30   
ug/mL                                   [facility]   
Phone:   
[phone]  
   
                                                    Interpretation and   
review of laboratory   
results         Abnormal                                        [facility]   
Phone:   
[phone]  
   
                                                                  [facility]   
Phone:   
[phone]  
   
                                                    CBC Auto DifferentialOrdered  
 By: Seth Rahman on 2021   
   
                      Absolute Eos # 0.62       High                  [facility]   
Phone:   
[phone]  
   
                                                    Absolute Immature   
Granulocyte     0.07                                            [facility]   
Phone:   
[phone]  
   
                      Absolute Lymph # 3.20                             [facility]   
Phone:   
[phone]  
   
                      Absolute Mono # 0.89                             [facility]   
Phone:   
[phone]  
   
                      Basophils (Bld) [#/Vol] 0.10 10*3/uL                        
 [facility]   
Phone:   
[phone]  
   
                      Basophils/100 WBC (Bld) 1 %                   0 - 2 %    M  
[facility]   
Phone:   
[phone]  
   
                      Differential Type NOT REPORTED                       [facility]   
Phone:   
[phone]  
   
                                                    Eosinophils/100 WBC   
(Bld)           5 %             High            1 - 4 %         [facility]   
Phone:   
[phone]  
   
                                                    Hematocrit (Bld)   
[Volume fraction]   43.4 %                                  36.3 - 47.1   
%                                       [facility]   
Phone:   
[phone]  
   
                                                    Hemoglobin.gastrointest  
inal spec 1 Ql (Stl) 14.6 g/dL                               11.9 - 15.1   
g/dL                                    [facility]   
Phone:   
[phone]  
   
                                                    Immature   
granulocytes/100 WBC   
(Bld)           1 %             High            0               [facility]   
Phone:   
[phone]  
   
                                                    Interpretation and   
review of laboratory   
results         Abnormal                                        [facility]   
Phone:   
[phone]  
   
                                                    Lymphocytes/100 WBC   
(Bld)           26 %                            24 - 43 %       [facility]   
Phone:   
[phone]  
   
                                                    MCH (RBC) [Entitic   
mass]               28.3 pg                                 25.2 - 33.5   
pg                                      [facility]   
Phone:   
[phone]  
   
                          MCHC (RBC) [Mass/Vol] 33.6 g/dL                 28.4 -  
 34.8   
g/dL                                    [facility]   
Phone:   
[phone]  
   
                          MCV (RBC) [Entitic vol] 84.3 fL                   82.6  
 -   
102.9 fL                                [facility]   
Phone:   
[phone]  
   
                      Monocytes/100 WBC (Bld) 7 %                   3 - 12 %   M  
[facility]   
Phone:   
[phone]  
   
                          NRBC Automated 0.0                       0.0 per 100   
WBC                                     [facility]   
Phone:   
[phone]  
   
                                                    Platelet distribution   
width (Bld) [Ratio] 12.3 %                                  11.8 - 14.4   
%                                       [facility]   
Phone:   
[phone]  
   
                      Platelet Estimate NOT REPORTED                       [facility]   
Phone:   
[phone]  
   
                                                    Platelet mean volume   
(Bld) [Entitic vol] 8.7 fL                                  8.1 - 13.5   
fL                                      [facility]   
Phone:   
[phone]  
   
                      Platelets (Bld) [#/Vol] 273 10*3/uL                         
[facility]   
Phone:   
[phone]  
   
                          RBC (Bld) [#/Vol] 5.15 10*6/uL High         3.95 - 5.1  
1   
m/uL                                    [facility]   
Phone:   
[phone]  
   
                      RBC (Bld) [#/Vol] NOT REPORTED                       [facility]   
Phone:   
[phone]  
   
                                                    Segmented   
neutrophils/100 WBC   
(Bld)           60 %                            36 - 65 %       [facility]   
Phone:   
[phone]  
   
                      Segs Absolute 7.61                             [facility]   
Phone:   
[phone]  
   
                      WBC (Bld) [#/Vol] 12.5 10*3/uL High                  [facility]   
Phone:   
[phone]  
   
                      WBC (Bld) [#/Vol] NOT REPORTED                       [facility]   
Phone:   
[phone]  
   
                                                                  [facility]   
Phone:   
[phone]  
   
                                                    CBC with Diffon 2021   
   
                      Abs. Basophil 0.10 k/uL  Normal     0.00-0.20  [facility]  
   
                                        Comment on above:   Performed By: #### D  
AU ####  
[facility]  
[address] [place] [address] [address] [place], [facility] [phone]  
: Haresh Zimmer MD   
   
                      Abs.Imm.Granulocyte 0.07 k/uL  Normal     0.00-0.30  [facility]  
   
                                        Comment on above:   Performed By: #### D  
AU ####  
[address] [place] [address]  
[phone]  
: Haresh Zimmer MD   
   
                      Abs.Neutrophil (Seg) 7.61 k/uL  Normal     1.50-8.10  [facility]  
   
                                        Comment on above:   Performed By: #### D  
AU ####  
[address] [place], [place] [address] [phone]  
: Haresh Zimmer MD   
   
                      Basophils/100 WBC (Bld) 1 %        Normal     0-2        [facility]  
   
                                        Comment on above:   Performed By: #### D  
AU ####  
[address] [place], [place] [address] [phone]  
: Haresh Zimmer MD   
   
                                                    Eosinophils (Bld)   
[#/Vol]         0.62 10*3/uL    High            0.00-0.44       [facility]  
   
                                        Comment on above:   Performed By: #### D  
AU ####  
[facility]  
[address] [place], [facility] [phone]  
: Haresh Zimmer MD   
   
                                                    Eosinophils/100 WBC   
(Bld)           5 %             High            1-4             [facility]  
   
                                        Comment on above:   Performed By: #### D  
AU ####  
[facility]  
[address] [place], [place] [address]  
[phone]  
: Haresh Zimmer MD   
   
                                                    Erythrocyte   
distribution width   
(RBC) [Ratio]   12.3 %          Normal          11.8-14.4       [facility]  
   
                                        Comment on above:   Performed By: #### D  
AU ####  
[facility]  
[address] [place], [facility] [phone]  
: Haresh Zimmer MD   
   
                                                    Hematocrit (Bld)   
[Volume fraction] 43.4 %          Normal          36.3-47.1       [facility]  
   
                                        Comment on above:   Performed By: #### D  
AU ####  
[facility]  
[address] [place], [facility] [phone]  
: Haresh Zimmer MD   
   
                                                    Hemoglobin (Bld)   
[Mass/Vol]      14.6 g/dL       Normal          11.9-15.1       [facility]  
   
                                        Comment on above:   Performed By: #### D  
AU ####  
[address] [place], [facility] [phone]  
: Haresh Zimmer MD   
   
                                                    Immature   
granulocytes/100 WBC   
(Bld)           1 %             High            0               [facility]  
   
                                        Comment on above:   Performed By: #### D  
AU ####  
[facility]  
[address] [place], [facility] [phone]  
: Haresh Zimmer MD   
   
                                                    Lymphocytes (Bld)   
[#/Vol]         3.20 10*3/uL    Normal          1.10-3.70       [facility]  
   
                                        Comment on above:   Performed By: #### D  
AU ####  
[facility]  
[address] [place], [facility] [phone]  
: Haresh Zimmer MD   
   
                                                    Lymphocytes/100 WBC   
(Bld)           26 %            Normal          24-43           [facility]  
   
                                        Comment on above:   Performed By: #### D  
AU ####  
[facility]  
[address] [place] [address] [phone]  
: Haresh Zimmer MD   
   
                                                    MCH (RBC) [Entitic   
mass]           28.3 pg         Normal          25.2-33.5       [facility]  
   
                                        Comment on above:   Performed By: #### D  
AU ####  
[facility]  
[address] [place] [place] [address]  
[phone]  
: Haresh Zimmer MD   
   
                      MCHC (RBC) [Mass/Vol] 33.6 g/dL  Normal     28.4-34.8  [facility]  
   
                                        Comment on above:   Performed By: #### D  
AU ####  
[address] [place], [facility] [phone]  
: Haresh Zimmer MD   
   
                      MCV (RBC) [Entitic vol] 84.3 fL    Normal     82.6-102.9 [facility]  
   
                                        Comment on above:   Performed By: #### D  
AU ####  
[address] [place], [place] [address] [phone]  
: Haresh Zimmer MD   
   
                      Monocytes (Bld) [#/Vol] 0.89 10*3/uL Normal     0.10-1.20   
 [facility]  
   
                                        Comment on above:   Performed By: #### D  
AU ####  
[facility]  
[address] [place] [address] [address] [place], [place] [address] [phone]  
: Haresh Zimmer MD   
   
                      Monocytes/100 WBC (Bld) 7 %        Normal     3-12       M  
[facility]  
   
                                        Comment on above:   Performed By: #### D  
AU ####  
[facility]  
[address] [place] [address] [address] [place], [place] [address] [phone]  
: Haresh Zimmer MD   
   
                      Neutrophil (Seg) 60 %       Normal     36-65      [facility]  
   
                                        Comment on above:   Performed By: #### D  
AU ####  
[facility]  
[address] [place], [place] [address] [phone]  
: Haresh Zimmer MD   
   
                      NRBC Automated 0.0 per 100 WBC Normal     0.0        [facility]  
   
                                        Comment on above:   Performed By: #### D  
AU ####  
[facility]  
[address] [place], [place] [address] [phone]  
: Haresh Zimmer MD   
   
                                                    Platelet mean volume   
(Bld) [Entitic vol] 8.7 fL          Normal          8.1-13.5        [facility]  
   
                                        Comment on above:   Performed By: #### D  
AU ####  
[address] [place], [place] [address] [phone]  
: Haresh Zimmer MD   
   
                      Platelets (Bld) [#/Vol] 273 10*3/uL Normal     138-453      
[facility]  
   
                                        Comment on above:   Performed By: #### D  
AU ####  
[address] [place], [place] [address] [phone]  
: Haresh Zimmer MD   
   
                      RBC (Bld) [#/Vol] 5.15 10*6/uL High       3.95-5.11  [facility]  
   
                                        Comment on above:   Performed By: #### D  
AU ####  
[address] [place], [place] [address] [phone]  
: Haresh Zimmer MD   
   
                      WBC (Bld) [#/Vol] 12.5 10*3/uL High       3.5-11.3   [facility]  
   
                                        Comment on above:   Performed By: #### D  
AU ####  
[facility]  
[address] [place] [address] [place] [place], [place] [address] [phone]  
: Haresh Zimmer MD   
   
                      Auto Diff Performed NOT REPORTED Normal                [facility]  
   
                                        Comment on above:   Performed By: #### D  
AU ####  
[facility]  
[address] [place] [address] [address] [place], [facility] [phone]  
: Haresh Zimmer MD   
   
                      Platelet Estimate NOT REPORTED Normal                [facility]  
   
                                        Comment on above:   Performed By: #### D  
AU ####  
[facility]  
[address] [place] [address] [phone]  
: Haresh Zimmer MD   
   
                                                    RBC morphology finding   
Nom (Bld)       NOT REPORTED    Normal                          [facility]  
   
                                        Comment on above:   Performed By: #### D  
AU ####  
[facility]  
[address] [place] [address] [phone]  
: Haresh Zimmer MD   
   
                      WBC Morphology NOT REPORTED Normal                [facility]  
   
                                        Comment on above:   Performed By: #### D  
AU ####  
[facility]  
[address] [place] [address] [phone]  
: Haresh Zimmer MD   
   
                                                    COVID-19, RapidOrdered By: DAVID Rahman on 2021   
   
                                                    SARS-CoV-2 (COVID-19)   
RNA PAMELA+probe Ql (Unsp   
spec)               Not detected                            Not   
Detected                                [facility]   
Phone:   
[phone]  
   
                                        Comment on above:     
Rapid NAAT: The specimen is NEGATIVE for SARS-CoV-2, the novel   
coronavirus associated with  
COVID-19.  
  
The ID NOW COVID-19 assay is designed to detect the virus that   
causes COVID-19 in patients  
with signs and symptoms of infection who are suspected of   
COVID-19.  
An individual without symptoms of COVID-19 and who is not   
shedding SARS-CoV-2 virus would  
expect to have a negative (not detected) result in this assay.  
Negative results should be treated as presumptive and, if   
inconsistent with clinical signs  
and symptoms or necessary for patient management,  
should be tested with an alternative molecular assay. Negative   
results do not preclude  
SARS-CoV-2 infection and  
should not be used as the sole basis for patient management   
decisions.  
  
Fact sheet for Healthcare Providers:   
https://www.fda.gov/media/402030/download  
Fact sheet for Patients:   
https://www.fda.gov/media/182609/download  
  
Methodology: Isothermal Nucleic Acid Amplification  
  
   
   
                      Specimen Description .NASOPHARYNGEAL SWAB                   
      [facility]   
Phone:   
[phone]  
   
                                                                  [employer]   
Phone:   
[phone]  
   
                                                    Comp Metabolic Profon 2021   
   
                      (cont.)               Normal                [facility]  
   
                                        Comment on above:   Result Comment: Aver  
age GFR for 20-29 years old:  
116 mL/min/1.73sq m  
Chronic Kidney Disease:  
<60 mL/min/1.73sq m  
Kidney failure:  
<15 mL/min/1.73sq m  
eGFR calculated using average adult body mass. Additional eGFR   
calculator  
available at:  
http://www.[employer]/multiple_crcl_2012.htm   
   
                                                            Performed By: #### D  
AU ####  
[facility]  
[address] [place] [address] [place] [address] [phone]  
: Haresh Zimmer MD   
   
                      Albumin [Mass/Vol] 4.2 g/dL   Normal     3.5-5.2    [facility]  
   
                                        Comment on above:   Performed By: #### D  
AU ####  
[facility]  
[address] [place] [address] [phone]  
: Haresh Zimmer MD   
   
                      Albumin/Glob Ratio 1.4        Normal     1.0-2.5    [facility]  
   
                                        Comment on above:   Performed By: #### D  
AU ####  
[address] [place] [address] [phone]  
: Haresh Zimmer MD   
   
                      Alkaline Phos 74 U/L     Normal          [facility]  
   
                                        Comment on above:   Performed By: #### D  
AU ####  
[facility]  
[address] [place], [place] [address] [phone]  
: Haresh Zimmer MD   
   
                                                    ALT [Catalytic   
activity/Vol]   31 U/L          Normal          5-33            [facility]  
   
                                        Comment on above:   Performed By: #### D  
AU ####  
[facility]  
[address] [place] [address] [phone]  
: Haresh Zimmer MD   
   
                      Anion gap [Moles/Vol] 13 mmol/L  Normal     9-17       [facility]  
   
                                        Comment on above:   Performed By: #### D  
AU ####  
[facility]  
[address] [place], [place] [address] [phone]  
: Haresh Zimmer MD   
   
                                                    AST [Catalytic   
activity/Vol]   19 U/L          Normal          <32             [facility]  
   
                                        Comment on above:   Performed By: #### D  
AU ####  
[facility]  
[address] [place], [place] [address] [phone]  
: Haresh Zimmer MD   
   
                      Bilirubin [Mass/Vol] 0.17 mg/dL Low        0.3-1.2    [facility]  
   
                                        Comment on above:   Performed By: #### D  
AU ####  
[facility]  
[address] [place], [place] [address] [phone]  
: Haresh Zimmer MD   
   
                      BUN/CRE Ratio 7          Low        9-20       [facility]  
   
                                        Comment on above:   Performed By: #### D  
AU ####  
[facility]  
[address] [place], [place] [address] [phone]  
: Haresh Zimmer MD   
   
                      Calcium [Mass/Vol] 9.2 mg/dL  Normal     8.6-10.4   [facility]  
   
                                        Comment on above:   Performed By: #### D  
AU ####  
[facility]  
[address] [place] [address] [place] [address]  
[phone]  
: Haresh Zimmer MD   
   
                      Chloride [Moles/Vol] 104 mmol/L Normal          [facility]  
   
                                        Comment on above:   Performed By: #### D  
AU ####  
[facility]  
[address] [place], [place] [address] [phone]  
: Haresh Zimmer MD   
   
                      CO2 [Moles/Vol] 20 mmol/L  Normal     20-31      [facility]  
   
                                        Comment on above:   Performed By: #### D  
AU ####  
[facility]  
[address] [place] [address] [place] [place], [place] [address] [phone]  
: Haresh Zimmer MD   
   
                      Creatinine [Mass/Vol] 0.82 mg/dL Normal     0.50-0.90  [facility]  
   
                                        Comment on above:   Performed By: #### D  
AU ####  
[facility]  
[address] [place], [place] [address] [phone]  
: Haresh Zimmer MD   
   
                      GFR, Amer >60        Normal     >60        [facility]  
   
                                        Comment on above:   Performed By: #### D  
AU ####  
[facility]  
[address] [place], [place] [address] [phone]  
: Haresh Zimmer MD   
   
                      GFR,non  Amer >60        Normal     >60        [facility]  
   
                                        Comment on above:   Performed By: #### D  
AU ####  
[facility]  
[address] [place], [place] [address] [phone]  
: Haresh Zimmer MD   
   
                      Glucose [Mass/Vol] 100 mg/dL  High       70-99      [facility]  
   
                                        Comment on above:   Performed By: #### D  
AU ####  
[facility]  
[address] [place], [place] [address] [phone]  
: Haresh Zimmer MD   
   
                      Potassium [Moles/Vol] 4.0 mmol/L Normal     3.7-5.3    [facility]  
   
                                        Comment on above:   Performed By: #### D  
AU ####  
[facility]  
[address] [place] [address] [place] [address]  
[phone]  
: Haresh Zimmer MD   
   
                      Protein [Mass/Vol] 7.2 g/dL   Normal     6.4-8.3    [facility]  
   
                                        Comment on above:   Performed By: #### D  
AU ####  
[facility]  
[address] [place] [address]  
[phone]  
: Haresh Zimmer MD   
   
                      Sodium [Moles/Vol] 137 mmol/L Normal     135-144    [facility]  
   
                                        Comment on above:   Performed By: #### D  
AU ####  
[facility]  
[address] [place], [place] [address] [phone]  
: Haresh Zimmer MD   
   
                      Staging:              Normal                [facility]  
   
                                        Comment on above:   Result Comment: Stag  
e 1: Some kidney damage normal GFR  
Stage 2: Mild kidney damage GFR 60-89  
Stage 3: Moderate kidney damage GFR 30-59  
Stage 4: Severe kidney damage GFR 15-29  
Stage 5: Severe kidney damage GFR <15  
ESRD - chronic treatment by dialysis or transplant   
   
                                                            Performed By: #### D  
AU ####  
[facility]  
[address] [place] [address] [phone]  
: Haresh Zimmer MD   
   
                                                    Urea nitrogen   
[Mass/Vol]      6 mg/dL         Normal          6-20            [facility]  
   
                                        Comment on above:   Performed By: #### D  
AU ####  
[facility]  
[address] [place] [address] [phone]  
: Haresh Zimmer MD   
   
                                                    Comprehensive Metabolic Pane  
lOrdered By: Seth Rahman on 2021   
   
                          Albumin [Mass/Vol] 4.2 g/dL                  3.5 - 5.2  
   
g/dL                                    [employer]   
Phone:   
[phone]  
   
                                                    Albumin/Globulin [Mass   
ratio]          1.4 {ratio}                                     [employer]   
Phone:   
[phone]  
   
                                                    ALP (Bld) [Catalytic   
activity/Vol]       74 U/L                                  35 - 104   
U/L                                     [employer]   
Phone:   
[phone]  
   
                                                    ALT [Catalytic   
activity/Vol]   31 U/L                          5 - 33 U/L      [employer]   
Phone:   
[phone]  
   
                          Anion gap [Moles/Vol] 13 mmol/L                 9 - 17  
   
mmol/L                                  [employer]   
Phone:   
[phone]  
   
                                                    AST [Catalytic   
activity/Vol]   19 U/L                          <32             [employer]   
Phone:   
[phone]  
   
                          Bilirubin [Mass/Vol] 0.17 mg/dL   Low          0.3 - 1  
.2   
mg/dL                                   [employer]   
Phone:   
[phone]  
   
                          Calcium [Mass/Vol] 9.2 mg/dL                 8.6 - 10.  
4   
mg/dL                                   [employer]   
Phone:   
[phone]  
   
                          Chloride [Moles/Vol] 104 mmol/L                98 - 10  
7   
mmol/L                                  [employer]   
Phone:   
[phone]  
   
                          CO2 [Moles/Vol] 20 mmol/L                 20 - 31   
mmol/L                                  [employer]   
Phone:   
[phone]  
   
                          Creatinine [Mass/Vol] 0.82 mg/dL                0.50 -  
 0.90   
mg/dL                                   [employer]   
Phone:   
[phone]  
   
                                                    Free PSA/Total PSA   
[Mass fraction]     7.2 g/dL                                6.4 - 8.3   
g/dL                                    [employer]   
Phone:   
[phone]  
   
                      GFR  >60                   >60 mL/min [facility]  
y   
Health  
Work   
Phone:   
[phone]  
   
                                                    GFR Non-   
American        >60                             >60 mL/min      [employer]   
Phone:   
[phone]  
   
                          Glucose [Mass/Vol] 100 mg/dL    High         70 - 99   
mg/dL                                   [employer]   
Phone:   
[phone]  
   
                          Potassium [Moles/Vol] 4.0 mmol/L                3.7 -   
5.3   
mmol/L                                  [employer]   
Phone:   
[phone]  
   
                          Sodium [Moles/Vol] 137 mmol/L                135 - 144  
   
mmol/L                                  [employer]   
Phone:   
[phone]  
   
                                                    Urea nitrogen (BldV)   
[Mass/Vol]          6 mg/dL                                 6 - 20   
mg/dL                                   [employer]   
Phone:   
[phone]  
   
                                                    Urea   
nitrogen/Creatinine   
(Bld) [Mass ratio] 7               Low                             [employer]   
Phone:   
[phone]  
   
                                                    Drug Scr, Abuse, Uron 2021   
   
                      Amphetamine(s),Ur Negative   Normal     NEG        [facility]  
   
                                        Comment on above:   Performed By: #### D  
AU ####  
[facility]  
[address] [place] [address] [phone]  
: Haresh Zimmer MD   
   
                      Barbiturate(s),Ur Negative   Normal     NEG        [facility]  
   
                                        Comment on above:   Performed By: #### D  
AU ####  
[facility]  
[address] [place] [address] [phone]  
: Haresh Zimmer MD   
   
                      Benzodiazepine(s) Negative   Normal     NEG        [facility]  
   
                                        Comment on above:   Performed By: #### D  
AU ####  
[facility]  
[address] [place], [place] [address] [phone]  
: Haresh Zimmer MD   
   
                      Buprenorphrine, Ur Negative   Normal     [facility]  
   
                                        Comment on above:   Performed By: #### D  
AU ####  
[facility]  
[address] [place], [place] [address] [phone]  
: Haresh Zimmer MD   
   
                      Cannabinoid(s),Ur Positive   Abnormal   NEG        [facility]  
   
                                        Comment on above:   Performed By: #### D  
AU ####  
[facility]  
[address] [place], [place] [address] [phone]  
: Haresh Zimmer MD   
   
                      Cocaine Metabolite Negative   Normal     [facility]  
   
                                        Comment on above:   Performed By: #### D  
AU ####  
[facility]  
[address] [place], [facility] [phone]  
: Haresh Zimmer MD   
   
                      Methadone Ql (U) Negative   Normal     [facility]  
   
                                        Comment on above:   Performed By: #### D  
AU ####  
[facility]  
[address] [place], [facility] [phone]  
: Haresh Zimmer MD   
   
                      Methamphetamine, Ur Negative   Normal     [facility]  
   
                                        Comment on above:   Performed By: #### D  
AU ####  
[facility]  
[address] [place], [facility] [phone]  
: Haresh Zimmer MD   
   
                      Opiate(s), Ur Negative   Normal     [facility]  
   
                                        Comment on above:   Performed By: #### D  
AU ####  
[facility]  
[address] [place], [facility] [phone]  
: Haresh Zimmer MD   
   
                      Oxycodone, Urine Negative   Normal     [facility]  
   
                                        Comment on above:   Performed By: #### D  
AU ####  
[facility]  
[address] [place] [address] [phone]  
: Haresh Zimmer MD   
   
                      Phencyclidine, Ur Negative   Normal     NEG        [facility]  
   
                                        Comment on above:   Performed By: #### D  
AU ####  
[facility]  
[address] [place] [address] [phone]  
: Haresh Zimmer MD   
   
                      Propoxyphene,Urine Negative   Normal     NEG        [facility]  
   
                                        Comment on above:   Performed By: #### D  
AU ####  
[facility]  
[address] [place] [address] [phone]  
: Haresh Zimmer MD   
   
                                                    Tricyclic   
antidepressants Screen   
Ql (U)          Negative        Normal          NEG             [facility]  
   
                                        Comment on above:   Result Comment: Drug  
 screen results are to be used for medical  
  
purposes only. All positive  
results are unconfirmed. Testing for employment or legal uses   
should be sent  
to a reference laboratory for confirmation.   
   
                                                            Performed By: #### D  
AU ####  
[facility]  
[address] [place] [address] [place] [place], [place] [address] [phone]  
: Haresh Zimmer MD   
   
                      Interpretive Info NOT REPORTED Normal                [facility]  
   
                                        Comment on above:   Performed By: #### D  
AU ####  
[facility]  
[address] [place] [address] [place] [address] [phone]  
: Haresh Zimmer MD   
   
                      MDMA, Urine NOT REPORTED Normal     NEG        [facility]  
   
                                        Comment on above:   Performed By: #### D  
AU ####  
[facility]  
[address] [place] [address] [place] [address] [phone]  
: Haresh Zimmer MD   
   
                                                    EthanolOrdered By: Seth purvis on 2021   
   
                      Ethanol [Mass/Vol] mg/dL                 <10 mg/dL  [facility]   
Phone:   
[phone]  
   
                      Ethanol percent <0.010                <0.010 %   [facility]   
Phone:   
[phone]  
   
                                                                  [facility]   
Phone:   
[phone]  
   
                                                    Ethanol Alcoholon 2021  
   
   
                      Ethanol [Mass/Vol] mg/dL      Normal     <10        [facility]  
   
                                        Comment on above:   Performed By: #### C  
OVRB ####  
[facility]  
[address] [place] [address] [phone]  
: Haresh Zimmer MD   
   
                      Ethanol percent <0.010     Normal     <0.010     [facility]  
   
                                        Comment on above:   Performed By: #### C  
OVRB ####  
[facility]  
[address] [place] [address] [phone]  
: Haresh Zimmer MD   
   
                                                    HCG Qualitative, SerumOrdere  
d By: Seth Rahman on 2021   
   
                      hCG Qual   Negative              NEGATIVE   [facility]  
[employer]   
Phone:   
[phone]  
   
                                        Comment on above:   Specimens with hCG l  
evels near the threshold of the test (25   
mIU/mL) may give a negative or  
indeterminate result. In such cases, another test should be   
performed with a new specimen  
in 48-72 hours. If early pregnancy is suspected clinically in   
this setting, correlation  
with quantitative serum b-hCG level is suggested.  
  
[employer] has confirmed the use of plasma for this   
test. This has not been cleared  
or approved by the U.S. Food and Drug Administration. The FDA   
has determined that such  
clearance is not necessary.  
  
   
   
                                                                  [employer]   
Phone:   
[phone]  
   
                                                    HCG Screen, Bloodon 20  
21   
   
                      HCG Screen, Blood Negative   Normal     NEG        [facility]  
   
                                        Comment on above:   Result Comment: Spec  
imens with hCG levels near the threshold   
of the test (25 mIU/mL) may give a  
negative or indeterminate result. In such cases, another test   
should be  
performed with a new specimen in 48-72 hours. If early   
pregnancy is suspected  
clinically in this setting, correlation with quantitative   
serum b-hCG level is  
suggested.  
[facility] has confirmed the use of plasma for this   
test. This has not  
been cleared or approved by the U.S. Food and Drug   
Administration. The FDA has  
determined that such clearance is not necessary.   
   
                                                            Performed By: #### D  
AU ####  
[facility]  
[address] [place] [address] [phone]  
: Haresh Zimmer MD   
   
                                                    Laboratory - Chemistry and C  
hemistry - challengeOrdered By: Seth Rahman on   
2021   
   
                                                    GFR/1.73 sq M.predicted   
MDRD (S/P/Bld) [Vol   
rate/Area]                                                      [facility]  
[employer]   
Phone:   
[phone]  
   
                                        Comment on above:   Average GFR for 20-2  
9 years old:  
116 mL/min/1.73sq m  
Chronic Kidney Disease:  
<60 mL/min/1.73sq m  
Kidney failure:  
<15 mL/min/1.73sq m  
  
  
eGFR calculated using average adult body mass. Additional eGFR   
calculator available at:  
  
http://www.[employer]/multiple_crcl_2012.htm  
  
  
   
   
                                                            Stage 1: Some kidney  
 damage normal GFR  
Stage 2: Mild kidney damage GFR 60-89  
Stage 3: Moderate kidney damage GFR 30-59  
Stage 4: Severe kidney damage GFR 15-29  
Stage 5: Severe kidney damage GFR <15  
ESRD - chronic treatment by dialysis or transplant  
  
  
   
   
                                                    No Panel InformationOrdered   
By: Seth Rahman on 2021   
   
                                                    Interpretation and   
review of laboratory   
results         Abnormal                                        [employer]   
Phone:   
[phone]  
   
                                                                  [employer]   
Phone:   
[phone]  
   
                                                    SARS-CoV-2on 2021   
   
                                                    SARS-CoV-2 (COVID-19)   
RNA PAMELA+probe Ql (Unsp   
spec)           Not detected    Normal          [facility]  
   
                                        Comment on above:   Result Comment:  
Rapid NAAT: The specimen is NEGATIVE for SARS-CoV-2, the novel   
coronavirus  
associated with COVID-19.  
The ID NOW COVID-19 assay is designed to detect the virus that   
causes COVID-19  
in patients with signs and symptoms of infection who are   
suspected of COVID-19.  
An individual without symptoms of COVID-19 and who is not   
shedding SARS-CoV-2  
virus would expect to have a negative (not detected) result in   
this assay.  
Negative results should be treated as presumptive and, if   
inconsistent with  
clinical signs and symptoms or necessary for patient   
management,  
should be tested with an alternative molecular assay. Negative   
results do not  
preclude SARS-CoV-2 infection and  
should not be used as the sole basis for patient management   
decisions.  
Fact sheet for Healthcare Providers:   
https://www.fda.gov/media/432636/download  
Fact sheet for Patients:   
https://www.fda.gov/media/265020/download  
Methodology: Isothermal Nucleic Acid Amplification   
   
                                                            Performed By: #### C  
OVRB ####  
[facility]  
[address] [place], [place] [address] [phone]  
: Haresh Zimmer MD   
   
                                                    Salicylateon 2021   
   
                      Salicylate <1         Low        3-10       [facility]  
   
                                        Comment on above:   Performed By: #### D  
AU ####  
[facility]  
[address] [place], [place] [address] [phone]  
: Haresh Zimmer MD   
   
                                                    SalicylateOrdered By: Meghna Rahman on 2021   
   
                          Salicylate Lvl <1           Low          3 - 10   
mg/dL                                   [facility]  
Work   
Phone:   
[phone]  
   
                                                    Urinalysis w/ Microon 2021   
   
                      -----                 Normal                [facility]  
   
                                        Comment on above:   Performed By: #### C  
OVRB ####  
[facility]  
[address] [place], [place] [address] [phone]  
: Haresh Zimmer MD   
   
                      Bacteria   1+         Abnormal   NONE       [facility]  
   
                                        Comment on above:   Performed By: #### C  
OVRB ####  
[facility]  
[address] [place], [place] [address] [phone]  
: Haresh Zimmer MD   
   
                      Bilirubin, SemiQt,Ur Negative   Normal     NEG        [facility]  
   
                                        Comment on above:   Performed By: #### C  
OVRB ####  
[facility]  
[address] [place], [place] [address] [phone]  
: Haresh Zimmer MD   
   
                      Blood, Urine Negative   Normal     NEG        [facility]  
   
                                        Comment on above:   Performed By: #### C  
OVRB ####  
[facility]  
[address] [place], [place] [address] [phone]  
: Haresh Zimmer MD   
   
                      Clarity (U) CLEAR      Normal     CLEAR      [facility]  
   
                                        Comment on above:   Performed By: #### C  
OVRB ####  
[facility]  
[address] [place], [place] [address] [phone]  
: Haresh Zimmer MD   
   
                      Color (U)  YELLOW     Normal     YEL        [facility]  
   
                                        Comment on above:   Performed By: #### C  
OVRB ####  
[facility]  
[address] [place], [place] [address] [phone]  
: Haresh Zimmer MD   
   
                                                    Epithelial cells LM Ql   
(Urine sed)     5 TO 10         Normal          0-25            [facility]  
   
                                        Comment on above:   Performed By: #### C  
OVRB ####  
[facility]  
[address] [place], [place] [address] [phone]  
: Haresh Zimmer MD   
   
                      Glucose Ql (U) Negative   Normal     NEG        [facility]  
   
                                        Comment on above:   Performed By: #### C  
OVRB ####  
[facility]  
[address] [place] [address] [address] [place], [place] [address] [phone]  
: Haresh Zimmer MD   
   
                      Ketones Ql (U) Negative   Normal     NEG        [facility]  
   
                                        Comment on above:   Performed By: #### C  
OVRB ####  
[facility]  
[address] [place] [address] [address] [place], [place] [address] [phone]  
: Haresh Zimmer MD   
   
                                                    Leukocyte esterase Test   
strip Ql (U)    Negative        Normal          NEG             [facility]  
   
                                        Comment on above:   Performed By: #### C  
OVRB ####  
[facility]  
[address] [place] [address] [place] [address]  
[phone]  
: Haresh Zimmer MD   
   
                      Mucus Strands 1+         Abnormal   NONE       [facility]  
   
                                        Comment on above:   Performed By: #### C  
OVRB ####  
[facility]  
[address] [place], [place] [address] [phone]  
: Haresh Zimmer MD   
   
                      Nitrite,Ur Negative   Normal     NEG        [facility]  
   
                                        Comment on above:   Performed By: #### C  
OVRB ####  
[facility]  
[address] [place], [place] [address] [phone]  
: Haresh Zimmer MD   
   
                      PH,Ur      6.5        Normal     5.0-9.0    [facility]  
   
                                        Comment on above:   Performed By: #### C  
OVRB ####  
[facility]  
[address] [place], [place] [address] [phone]  
: Haresh Zimmer MD   
   
                      Protein Ql (U) Negative   Normal     NEG        [facility]  
   
                                        Comment on above:   Performed By: #### C  
OVRB ####  
[facility]  
[address] [place], [place] [address] [phone]  
: Haresh Zimmer MD   
   
                      Spec. Gravity,Ur 1.020      Normal     1.010-1.020 [facility]  
   
                                        Comment on above:   Performed By: #### C  
OVRB ####  
[facility]  
[address] [place] [address] [phone]  
: Haresh Zimmer MD   
   
                      Urine RBC's 0 TO 2     Normal     0-2        [facility]  
   
                                        Comment on above:   Performed By: #### C  
OVRB ####  
[facility]  
[address] [place], [facility] [phone]  
: Haresh Zimmer MD   
   
                      Urine WBC's 0 TO 2     Normal     0-5        [facility]  
   
                                        Comment on above:   Performed By: #### C  
OVRB ####  
[address] [place] [address]  
[phone]  
: Haresh Zimmer MD   
   
                      Urobilinogen,Ur Normal     Normal     NORM       [facility]  
   
                                        Comment on above:   Performed By: #### C  
OVRB ####  
[facility]  
[address] [place], [facility]  
[phone]  
: Haresh Zimmer MD   
   
                                                    Amorphous sediment LM   
Ql (Urine sed)  NOT REPORTED    Normal          NONE            [facility]  
   
                                        Comment on above:   Performed By: #### C  
OVRB ####  
[facility]  
[address] [place], [place] [address] [phone]  
: Haresh Zimmer MD   
   
                      Casts      NOT REPORTED Normal                [facility]  
   
                                        Comment on above:   Performed By: #### C  
OVRB ####  
[facility]  
[address] [place], [place] [address] [phone]  
: Haresh Zimmer MD   
   
                      Comment    NOT REPORTED Normal                [facility]  
   
                                        Comment on above:   Performed By: #### C  
OVRB ####  
[facility]  
[address] [place], [place] [address] [phone]  
: Haresh Zimmer MD   
   
                                                    Crystals LM Nom (Urine   
sed)            NOT REPORTED    Normal          [facility]  
   
                                        Comment on above:   Performed By: #### C  
OVRB ####  
[facility]  
[address] [place], [place] [address] [phone]  
: Haresh Zimmer MD   
   
                      Epithelial, Renal NOT REPORTED Normal     [address] [place] [address]  
   
                                        Comment on above:   Performed By: #### C  
OVRB ####  
[facility]  
[address] [place], [place] [address] [phone]  
: Haresh Zimmer MD   
   
                      Other Observations NOT REPORTED Normal     NREQ       [facility]  
   
                                        Comment on above:   Performed By: #### C  
OVRB ####  
[facility]  
[address] [place] [address]  
[phone]  
: Haresh Zimmer MD   
   
                      Trichomonas NOT REPORTED Normal     [facility]  
   
                                        Comment on above:   Performed By: #### C  
OVRB ####  
[facility]  
[address] [place] [address] [address] [place], [place] [address] [phone]  
: Haresh Zimmer MD   
   
                      Yeast      NOT REPORTED Normal     NONE       [facility]  
   
                                        Comment on above:   Performed By: #### C  
OVRB ####  
[facility]  
[address] [place], [place] [address] [phone]  
: Haresh Zimmer MD   
   
                                                    Urinalysis with microscopicO  
rdered By: Seth Rahman on 2021   
   
                      -                                           [facility]  
[employer]   
Phone:   
[phone]  
   
                      Amorphous, UA NOT REPORTED            None       [facility]   
Phone:   
[phone]  
   
                      Bacteria, UA 1+         Abnormal   None       [facility]   
Phone:   
[phone]  
   
                      Bilirubin Urine Negative              NEGATIVE   [facility]  
Work   
Phone:   
[phone]  
   
                      Casts UA   NOT REPORTED            /LPF       [facility]   
[facility]  
Work   
Phone:   
[phone]  
   
                      Color, UA  YELLOW                YELLOW     [facility]  
Work   
Phone:   
[phone]  
   
                      Crystals, UA NOT REPORTED            None /HPF  [facility]   
[facility]  
Work   
Phone:   
[phone]  
   
                      Epithelial Cells UA 5 TO 10                          [facility]  
   
[facility]  
Work   
Phone:   
[phone]  
   
                      Glucose, Ur Negative              NEGATIVE   [facility]  
Work   
Phone:   
[phone]  
   
                                                    Interpretation and   
review of laboratory   
results         Abnormal                                        [facility]  
Work   
Phone:   
[phone]  
   
                      Ketones Ql (U) Negative              NEGATIVE   [facility]   
[facility]  
Work   
Phone:   
[phone]  
   
                                                    Leukocyte esterase Test   
strip Ql (U)    Negative                        NEGATIVE        [facility]  
Work   
Phone:   
[phone]  
   
                      Mucus, UA  1+         Abnormal   None       [facility]  
Work   
Phone:   
[phone]  
   
                      Nitrite, Urine Negative              NEGATIVE   [facility]  
Work   
Phone:   
[phone]  
   
                      Other Observations UA NOT REPORTED            NOT REQ.   M  
[facility]  
Work   
Phone:   
[phone]  
   
                      pH, UA     6.5                              [facility]   
[facility]  
Work   
Phone:   
[phone]  
   
                      Protein, UA Negative              NEGATIVE   [facility]  
Work   
Phone:   
[phone]  
   
                      RBC, UA    0 TO 2                           [facility]   
[facility]  
Work   
Phone:   
[phone]  
   
                      Renal Epithelial, UA NOT REPORTED            0 /HPF     [facility]  
y   
Health  
Work   
Phone:   
[phone]  
   
                      Specific Gravity, UA 1.020                            [facility]  
[facility]  
Work   
Phone:   
[phone]  
   
                      Trichomonas, UA NOT REPORTED            None       [facility]   
[facility]  
Work   
Phone:   
[phone]  
   
                      Turbidity UA CLEAR                 CLEAR      [facility]   
[facility]  
Work   
Phone:   
[phone]  
   
                      Urinalysis Comments NOT REPORTED                       [facility]  
   
Health  
Work   
Phone:   
[phone]  
   
                      Urine Hgb  Negative              NEGATIVE   [facility]  
Work   
Phone:   
[phone]  
   
                      Urobilinogen, Urine Normal                Normal     [facility]  
   
[facility]  
Work   
Phone:   
[phone]  
   
                      WBC, UA    0 TO 2                           [facility]  
Work   
Phone:   
[phone]  
   
                      Yeast, UA  NOT REPORTED            None       [facility]  
Work   
Phone:   
[phone]  
   
                                                                  [facility]  
Work   
Phone:   
[phone]  
   
                                                    Urine Drug ScreenOrdered By:  
 Seth Rahman on 2021   
   
                      Amphetamine Screen, Ur Negative              NEGATIVE   [facility]   
[facility]  
Work   
Phone:   
[phone]  
   
                      Barbiturate Screen, Ur Negative              NEGATIVE   [facility]   
[facility]  
Work   
Phone:   
[phone]  
   
                                                    Benzodiazepine Screen,   
Urine           Negative                        NEGATIVE        [facility]   
[facility]  
Work   
Phone:   
[phone]  
   
                      Buprenorphine Urine Negative              NEGATIVE   [facility]  
Work   
Phone:   
[phone]  
   
                      Cannabinoid Scrn, Ur Positive   Abnormal   NEGATIVE   [facility]  
y   
Health  
Work   
Phone:   
[phone]  
   
                                                    Cocaine Metabolite,   
Urine           Negative                        NEGATIVE        [facility]  
Work   
Phone:   
[phone]  
   
                                                    Interpretation and   
review of laboratory   
results         Abnormal                                        [facility]  
Work   
Phone:   
[phone]  
   
                      MDMA, Urine NOT REPORTED            NEGATIVE   [facility]  
Work   
Phone:   
[phone]  
   
                      Methadone Screen, Urine Negative              NEGATIVE   [facility]   
[facility]  
Work   
Phone:   
[phone]  
   
                      Methamphetamine, Urine Negative              NEGATIVE   [facility]   
[facility]  
Work   
Phone:   
[phone]  
   
                      Opiates, Urine Negative              NEGATIVE   [facility]   
Health  
Work   
Phone:   
[phone]  
   
                      Oxycodone Screen, Ur Negative              NEGATIVE   [facility]  
y   
Health  
Work   
Phone:   
[phone]  
   
                      Phencyclidine, Urine Negative              NEGATIVE   [facility]  
y   
Health  
Work   
Phone:   
[phone]  
   
                      Propoxyphene, Urine Negative              NEGATIVE   [facility]  
   
Health  
Work   
Phone:   
[phone]  
   
                      Test Information NOT REPORTED                       [facility]  
Work   
Phone:   
[phone]  
   
                                                    Tricyclic   
Antidepressants, Urine Negative                        NEGATIVE        [facility]   
[facility]  
Work   
Phone:   
[phone]  
   
                                        Comment on above:   Drug screen results   
are to be used for medical purposes only.   
All positive results are  
unconfirmed. Testing for employment or legal uses should be   
sent to a reference laboratory  
for confirmation.  
  
   
   
                                                                  [facility]  
Work   
Phone:   
[phone]  
   
                                                    Acetaminophenon 2021   
   
                                                    Acetaminophen   
[Mass/Vol]      ug/mL           Low             10-30           [facility]  
   
                                        Comment on above:   Performed By: #### D  
AU ####  
[facility]  
[address] [place] [address] [phone]  
: Haresh Zimmer MD   
   
                                                    Acetaminophen levelOrdered B  
y: Malika Shepherd on 2021   
   
                          Acetaminophen Level <5           Low          10 - 30   
ug/mL                                   [employer] [facility]   
Phone:   
[phone]  
   
                                                    Interpretation and   
review of laboratory   
results         Abnormal                                        [facility] [facility]   
Phone:   
[phone]  
   
                                                                  [facility] [facility]   
Phone:   
[phone]  
   
                                                    CBC auto differentialOrdered  
 By: Malika Chengsain on 2021   
   
                      Absolute Eos # 0.18                             [employer] [facility]   
Phone:   
[phone]  
   
                                                    Absolute Immature   
Granulocyte     0.06                                            [employer] [facility]   
Phone:   
[phone]  
   
                      Absolute Lymph # 2.41                             [employer] [facility]   
Phone:   
[phone]  
   
                      Absolute Mono # 0.83                             [employer]   
Phone:   
[phone]  
   
                      Basophils (Bld) [#/Vol] 0.07 10*3/uL                        
 [employer] [facility]   
Phone:   
[phone]  
   
                      Basophils/100 WBC (Bld) 1 %                   0 - 2 %    M  
[facility]   
Phone:   
[phone]  
   
                      Differential Type NOT REPORTED                       [employer]   
Phone:   
[phone]  
   
                                                    Eosinophils/100 WBC   
(Bld)           1 %                             1 - 4 %         [employer] [facility]   
Phone:   
[phone]  
   
                                                    Hematocrit (Bld)   
[Volume fraction]   45.1 %                                  36.3 - 47.1   
%                                       [facility] [facility]   
Phone:   
[phone]  
   
                                                    Hemoglobin.gastrointest  
inal spec 1 Ql (Stl) 15.0 g/dL                               11.9 - 15.1   
g/dL                                    [employer] [facility]   
Phone:   
[phone]  
   
                                                    Immature   
granulocytes/100 WBC   
(Bld)           1 %             High            0               [facility]   
Phone:   
[phone]  
   
                                                    Interpretation and   
review of laboratory   
results         Abnormal                                        [facility] [facility]   
Phone:   
[phone]  
   
                                                    Lymphocytes/100 WBC   
(Bld)           18 %            Low             24 - 43 %       [employer] [facility]   
Phone:   
[phone]  
   
                                                    MCH (RBC) [Entitic   
mass]               28.4 pg                                 25.2 - 33.5   
pg                                      [employer] [facility]   
Phone:   
[phone]  
   
                          MCHC (RBC) [Mass/Vol] 33.3 g/dL                 28.4 -  
 34.8   
g/dL                                    [employer] [facility]   
Phone:   
[phone]  
   
                          MCV (RBC) [Entitic vol] 85.4 fL                   82.6  
 -   
102.9 fL                                [employer] [facility]   
Phone:   
[phone]  
   
                      Monocytes/100 WBC (Bld) 6 %                   3 - 12 %   M  
[facility]   
Phone:   
[phone]  
   
                          NRBC Automated 0.0                       0.0 per 100   
WBC                                     [employer] [facility]   
Phone:   
[phone]  
   
                                                    Platelet distribution   
width (Bld) [Ratio] 13.0 %                                  11.8 - 14.4   
%                                       [employer] [facility]   
Phone:   
[phone]  
   
                      Platelet Estimate NOT REPORTED                       [employer] [facility]   
Phone:   
[phone]  
   
                                                    Platelet mean volume   
(Bld) [Entitic vol] 8.3 fL                                  8.1 - 13.5   
fL                                      [employer] [facility]   
Phone:   
[phone]  
   
                      Platelets (Bld) [#/Vol] 301 10*3/uL                         
[employer] [facility]   
Phone:   
[phone]  
   
                          RBC (Bld) [#/Vol] 5.28 10*6/uL High         3.95 - 5.1  
1   
m/uL                                    [employer] [facility]   
Phone:   
[phone]  
   
                      RBC (Bld) [#/Vol] NOT REPORTED                       [employer] [facility]   
Phone:   
[phone]  
   
                                                    Segmented   
neutrophils/100 WBC   
(Bld)           73 %            High            36 - 65 %       [employer] [facility]   
Phone:   
[phone]  
   
                      Segs Absolute 9.71       High                  [employer]   
Phone:   
[phone]  
   
                      WBC (Bld) [#/Vol] 13.3 10*3/uL High                  [employer] [facility]   
Phone:   
[phone]  
   
                      WBC (Bld) [#/Vol] NOT REPORTED                       [facility]  
Work   
Phone:   
[phone]  
   
                                                                  [facility]  
Work   
Phone:   
[phone]  
   
                                                    CBC with Diffon 2021   
   
                      Abs. Basophil 0.07 k/uL  Normal     0.00-0.20  [facility]  
   
                                        Comment on above:   Performed By: #### C  
DP, WEN, CP, HCG ####  
[address] [place] [address]  
[phone]  
: Haresh Zimmer MD   
   
                      Abs.Imm.Granulocyte 0.06 k/uL  Normal     0.00-0.30  [facility]  
   
                                        Comment on above:   Performed By: #### C  
DPWEN, CP, HCG ####  
[address] [place] [address] [phone]  
: Haresh Zimmer MD   
   
                      Abs.Neutrophil (Seg) 9.71 k/uL  High       1.50-8.10  [facility]  
   
                                        Comment on above:   Performed By: #### C  
WEN MARTIN, CP, HCG ####  
[address] [place] [address]  
[phone]  
: Haresh Zimmer MD   
   
                      Basophils/100 WBC (Bld) 1 %        Normal     0-2        [facility]  
   
                                        Comment on above:   Performed By: #### C  
WEN MARTIN, CP, HCG ####  
[address] [place] [address]  
[phone]  
: Haresh Zimmer MD   
   
                                                    Eosinophils (Bld)   
[#/Vol]         0.18 10*3/uL    Normal          0.00-0.44       [facility]  
   
                                        Comment on above:   Performed By: #### C  
DP, SALI, CP, HCG ####  
[address] [place], [place] [address] [phone]  
: Haresh Zimmer MD   
   
                                                    Eosinophils/100 WBC   
(Bld)           1 %             Normal          1-4             [facility]  
   
                                        Comment on above:   Performed By: #### C  
DP, SALI, CP, HCG ####  
[facility]  
[address] [place] [address] [place] [address]  
[phone]  
: Haresh Zimmer MD   
   
                                                    Erythrocyte   
distribution width   
(RBC) [Ratio]   13.0 %          Normal          11.8-14.4       [facility]  
   
                                        Comment on above:   Performed By: #### C  
DP, SALI, CP, HCG ####  
[address] [place] [address]  
[phone]  
: Haresh Zimmer MD   
   
                                                    Hematocrit (Bld)   
[Volume fraction] 45.1 %          Normal          36.3-47.1       [facility]  
   
                                        Comment on above:   Performed By: #### C  
DP, SALI, CP, HCG ####  
[address] [place] [address]  
[phone]  
: Haresh Zimmer MD   
   
                                                    Hemoglobin (Bld)   
[Mass/Vol]      15.0 g/dL       Normal          11.9-15.1       [facility]  
   
                                        Comment on above:   Performed By: #### C  
DP, SALI, CP, HCG ####  
[address] [place] [address]  
[phone]  
: Haresh Zimmer MD   
   
                                                    Immature   
granulocytes/100 WBC   
(Bld)           1 %             High            0               [facility]  
   
                                        Comment on above:   Performed By: #### C  
DP, SALI, CP, HCG ####  
[address] [place] [address]  
[phone]  
: Haresh Zimmer MD   
   
                                                    Lymphocytes (Bld)   
[#/Vol]         2.41 10*3/uL    Normal          1.10-3.70       [facility]  
   
                                        Comment on above:   Performed By: #### C  
DP, SALI, CP, HCG ####  
[address] [place], [place] [address] [phone]  
: Haresh Zimmer MD   
   
                                                    Lymphocytes/100 WBC   
(Bld)           18 %            Low             24-43           [facility]  
   
                                        Comment on above:   Performed By: #### C  
DP, SALI, CP, HCG ####  
[facility]  
[address] [place] [address]  
[phone]  
: Haresh Zimmer MD   
   
                                                    MCH (RBC) [Entitic   
mass]           28.4 pg         Normal          25.2-33.5       [facility]  
   
                                        Comment on above:   Performed By: #### C  
DP, SALI, CP, HCG ####  
[facility]  
[address] [place] [address]  
[phone]  
: Haresh Zimmer MD   
   
                      MCHC (RBC) [Mass/Vol] 33.3 g/dL  Normal     28.4-34.8  [facility]  
   
                                        Comment on above:   Performed By: #### C  
DP, SALI, CP, HCG ####  
[address] [place] [address]  
[phone]  
: Haresh Zimmer MD   
   
                      MCV (RBC) [Entitic vol] 85.4 fL    Normal     82.6-102.9 [facility]  
   
                                        Comment on above:   Performed By: #### C  
DP, WEN, CP, HCG ####  
[address] [place] [address]  
[phone]  
: Haresh Zimmer MD   
   
                      Monocytes (Bld) [#/Vol] 0.83 10*3/uL Normal     0.10-1.20   
 [facility]  
   
                                        Comment on above:   Performed By: #### C  
DP, SALI, CP, HCG ####  
[address] [place] [address]  
[phone]  
: Haresh Zimmer MD   
   
                      Monocytes/100 WBC (Bld) 6 %        Normal     3-12       [facility]  
[facility]  
   
                                        Comment on above:   Performed By: #### C  
DP, SALI, CP, HCG ####  
[address] [place] [address]  
[phone]  
: Haresh Zimmer MD   
   
                      Neutrophil (Seg) 73 %       High       36-65      [facility]  
   
                                        Comment on above:   Performed By: #### C  
DP SALI, CP, HCG ####  
[address] [place] [address]  
[phone]  
: Haresh Zimmer MD   
   
                      NRBC Automated 0.0 per 100 WBC Normal     0.0        [facility]  
   
                                        Comment on above:   Performed By: #### C  
DP, SALI, CP, HCG ####  
[address] [place], [place] [address]  
[phone]  
: Haresh Zimmer MD   
   
                                                    Platelet mean volume   
(Bld) [Entitic vol] 8.3 fL          Normal          8.1-13.5        [facility]  
   
                                        Comment on above:   Performed By: #### C  
WEN MARTIN, CP, HCG ####  
[address] [place] [address]  
[phone]  
: Haresh Zimmer MD   
   
                      Platelets (Bld) [#/Vol] 301 10*3/uL Normal     138-453      
[facility]  
   
                                        Comment on above:   Performed By: #### C  
DPWEN, CP, HCG ####  
[address] [place] [address]  
[phone]  
: Haresh Zimmer MD   
   
                      RBC (Bld) [#/Vol] 5.28 10*6/uL High       3.95-5.11  [facility]  
   
                                        Comment on above:   Performed By: #### C  
WEN MARTIN, CP, HCG ####  
[address] [place], [facility]  
[phone]  
: Haresh Zimmer MD   
   
                      WBC (Bld) [#/Vol] 13.3 10*3/uL High       3.5-11.3   [facility]  
   
                                        Comment on above:   Performed By: #### C  
VERONICA SALI, CP, HCG ####  
[address] [place] [address]  
[phone]  
: Haresh Zimmer MD   
   
                      Auto Diff Performed NOT REPORTED Normal                [facility]  
   
                                        Comment on above:   Performed By: #### C  
DP, SALI, CP, HCG ####  
[facility]  
[address] [place], [place] [address] [phone]  
: Haresh Zimmer MD   
   
                      Platelet Estimate NOT REPORTED Normal                [facility]  
   
                                        Comment on above:   Performed By: #### C  
DP, SALI, CP, HCG ####  
[facility]  
[address] [place] [address]  
[phone]  
: Haresh Zimmer MD   
   
                                                    RBC morphology finding   
Nom (Bld)       NOT REPORTED    Normal                          [facility]  
   
                                        Comment on above:   Performed By: #### C  
DP, SALI, CP, HCG ####  
[facility]  
[address] [place] [address] [place] [address]  
[phone]  
: Haresh Zimmer MD   
   
                      WBC Morphology NOT REPORTED Normal                [facility]  
   
                                        Comment on above:   Performed By: #### C  
DP, SALI, CP, HCG ####  
[address] [place], [place] [address] [phone]  
: Haresh Zimmer MD   
   
                                                    COVID-19, RapidOrdered By: SIMONE Shepherd on 2021   
   
                                                    SARS-CoV-2 (COVID-19)   
RNA PAMELA+probe Ql (Unsp   
spec)               Not detected                            Not   
Detected                                [facility]  
Work   
Phone:   
[phone]  
   
                                        Comment on above:     
Rapid NAAT: The specimen is NEGATIVE for SARS-CoV-2, the novel   
coronavirus associated with  
COVID-19.  
  
The ID NOW COVID-19 assay is designed to detect the virus that   
causes COVID-19 in patients  
with signs and symptoms of infection who are suspected of   
COVID-19.  
An individual without symptoms of COVID-19 and who is not   
shedding SARS-CoV-2 virus would  
expect to have a negative (not detected) result in this assay.  
Negative results should be treated as presumptive and, if   
inconsistent with clinical signs  
and symptoms or necessary for patient management,  
should be tested with an alternative molecular assay. Negative   
results do not preclude  
SARS-CoV-2 infection and  
should not be used as the sole basis for patient management   
decisions.  
  
Fact sheet for Healthcare Providers:   
https://www.fda.gov/media/203285/download  
Fact sheet for Patients:   
https://www.fda.gov/media/917051/download  
  
Methodology: Isothermal Nucleic Acid Amplification  
  
   
   
                      Specimen Description .NASOPHARYNGEAL SWAB                   
      [facility]  
Work   
Phone:   
[phone]  
   
                                                                  [facility]  
Work   
Phone:   
[phone]  
   
                                                    Comp Metabolic Profon 2021   
   
                      (cont.)               Normal                [facility]  
   
                                        Comment on above:   Result Comment: Aver  
age GFR for 20-29 years old:  
116 mL/min/1.73sq m  
Chronic Kidney Disease:  
<60 mL/min/1.73sq m  
Kidney failure:  
<15 mL/min/1.73sq m  
eGFR calculated using average adult body mass. Additional eGFR   
calculator  
available at:  
http://www.[employer]/multiple_crcl_.htm   
   
                                                            Performed By: #### C  
WEN MARTIN CP, HCG ####  
[facility]  
[address] [place] [address] [place] [address] [phone]  
: Haresh Zimmer MD   
   
                      Albumin [Mass/Vol] 4.4 g/dL   Normal     3.5-5.2    [facility]  
   
                                        Comment on above:   Performed By: #### C  
WEN MARTIN CP, HCG ####  
[address] [place] [address] [phone]  
: Haresh Zimmer MD   
   
                      Albumin/Glob Ratio 1.3        Normal     1.0-2.5    [facility]  
   
                                        Comment on above:   Performed By: #### C  
WEN MARTIN CP, HCG ####  
[facility]  
[address] [place] [address] [phone]  
: Haresh Zimmer MD   
   
                      Alkaline Phos 82 U/L     Normal          [facility]  
   
                                        Comment on above:   Performed By: #### C  
WEN MARTIN CP, HCG ####  
[facility]  
[address] [place] [address] [phone]  
: Haresh Zimmer MD   
   
                                                    ALT [Catalytic   
activity/Vol]   40 U/L          High            5-33            [facility]  
   
                                        Comment on above:   Performed By: #### C  
WEN MARTIN CP, HCG ####  
[facility]  
[address] [place], [place] [address] [phone]  
: Haresh Zimmer MD   
   
                      Anion gap [Moles/Vol] 13 mmol/L  Normal     9-17       [facility]  
   
                                        Comment on above:   Performed By: #### C  
DP, SALI, CP, HCG ####  
[facility]  
[address] [place] [address]  
[phone]  
: Haresh Zimmer MD   
   
                                                    AST [Catalytic   
activity/Vol]   26 U/L          Normal          <32             [facility]  
   
                                        Comment on above:   Performed By: #### C  
DP, SALI, CP, HCG ####  
[address] [place] [address] [phone]  
: Haresh Zimmer MD   
   
                      Bilirubin [Mass/Vol] 0.39 mg/dL Normal     0.3-1.2    [facility]  
   
                                        Comment on above:   Performed By: #### C  
DP, SALI, CP, HCG ####  
[address] [place], [place] [address] [phone]  
: Haresh Zimmer MD   
   
                      BUN/CRE Ratio 12         Normal     9-20       [facility]  
   
                                        Comment on above:   Performed By: #### C  
DP, SALI, CP, HCG ####  
[address] [place], [place] [address] [phone]  
: Haresh Zimmer MD   
   
                      Calcium [Mass/Vol] 9.9 mg/dL  Normal     8.6-10.4   [facility]  
   
                                        Comment on above:   Performed By: #### C  
DP, SALI, CP, HCG ####  
[address] [place], [place] [address] [phone]  
: Haresh Zimmer MD   
   
                      Chloride [Moles/Vol] 99 mmol/L  Normal          [facility]  
   
                                        Comment on above:   Performed By: #### C  
DP, SALI, CP, HCG ####  
[address] [place], [place] [address] [phone]  
: Haresh Zimmer MD   
   
                      CO2 [Moles/Vol] 25 mmol/L  Normal     20-31      [facility]  
   
                                        Comment on above:   Performed By: #### C  
DP, SALI, CP, HCG ####  
[facility]  
[address] [place], [place] [address] [phone]  
: Haresh Zimmer MD   
   
                      Creatinine [Mass/Vol] 0.81 mg/dL Normal     0.50-0.90  [facility]  
   
                                        Comment on above:   Performed By: #### C  
DP, SALI, CP, HCG ####  
[facility]  
[address] [place], [place] [address] [phone]  
: Haresh Zimmer MD   
   
                      GFR, Amer >60        Normal     >60        [facility]  
   
                                        Comment on above:   Performed By: #### C  
DP, SALI, CP, HCG ####  
[facility]  
[address] [place] [address] [place] [place], [place] [address] [phone]  
: Haresh Zimmer MD   
   
                      GFR,non  Amer >60        Normal     >60        [facility]  
   
                                        Comment on above:   Performed By: #### C  
DP, SALI, CP, HCG ####  
[facility]  
[address] [place] [address] [place] [place], [place] [address] [phone]  
: Haresh Zimmer MD   
   
                      Glucose [Mass/Vol] 86 mg/dL   Normal     70-99      [facility]  
   
                                        Comment on above:   Performed By: #### C  
DP, SALI, CP, HCG ####  
[facility]  
[address] [place], [place] [address] [phone]  
: Haresh Zimmer MD   
   
                      Potassium [Moles/Vol] 3.7 mmol/L Normal     3.7-5.3    [facility]  
   
                                        Comment on above:   Performed By: #### C  
DP, SALI, CP, HCG ####  
[facility]  
[address] [place], [place] [address] [phone]  
: Haresh Zimmer MD   
   
                      Protein [Mass/Vol] 7.8 g/dL   Normal     6.4-8.3    [facility]  
   
                                        Comment on above:   Performed By: #### C  
DP, WEN, CP, HCG ####  
[facility]  
[address] [place] [address] [phone]  
: Haresh Zimmer MD   
   
                      Sodium [Moles/Vol] 137 mmol/L Normal     135-144    [facility]  
   
                                        Comment on above:   Performed By: #### C  
DP, SALI, CP, HCG ####  
[facility]  
[address] [place] [address] [phone]  
: Haresh Zimmer MD   
   
                      Staging:              Normal                [facility]  
   
                                        Comment on above:   Result Comment: Stag  
e 1: Some kidney damage normal GFR  
Stage 2: Mild kidney damage GFR 60-89  
Stage 3: Moderate kidney damage GFR 30-59  
Stage 4: Severe kidney damage GFR 15-29  
Stage 5: Severe kidney damage GFR <15  
ESRD - chronic treatment by dialysis or transplant   
   
                                                            Performed By: #### C  
DP, WEN, CP, HCG ####  
[facility]  
[address] [place] [address] [phone]  
: Haresh Zimmer MD   
   
                                                    Urea nitrogen   
[Mass/Vol]      10 mg/dL        Normal          6-20            [facility]  
   
                                        Comment on above:   Performed By: #### C  
DP, SALI, CP, HCG ####  
[facility]  
[address] [place] [address] [phone]  
: Haresh Zimmer MD   
   
                                                    Comprehensive Metabolic Pane  
lOrdered By: Malika Shepherd on 2021   
   
                          Albumin [Mass/Vol] 4.4 g/dL                  3.5 - 5.2  
   
g/dL                                    [facility]  
Work   
Phone:   
[phone]  
   
                                                    Albumin/Globulin [Mass   
ratio]          1.3 {ratio}                                     [facility]   
Phone:   
[phone]  
   
                                                    ALP (Bld) [Catalytic   
activity/Vol]       82 U/L                                  35 - 104   
U/L                                     [facility]   
Phone:   
[phone]  
   
                                                    ALT [Catalytic   
activity/Vol]   40 U/L          High            5 - 33 U/L      [facility]   
Phone:   
[phone]  
   
                          Anion gap [Moles/Vol] 13 mmol/L                 9 - 17  
   
mmol/L                                  [facility]   
Phone:   
[phone]  
   
                                                    AST [Catalytic   
activity/Vol]   26 U/L                          <32             [facility]   
Phone:   
[phone]  
   
                          Bilirubin [Mass/Vol] 0.39 mg/dL                0.3 - 1  
.2   
mg/dL                                   [facility]   
Phone:   
[phone]  
   
                          Calcium [Mass/Vol] 9.9 mg/dL                 8.6 - 10.  
4   
mg/dL                                   [facility]   
Phone:   
[phone]  
   
                          Chloride [Moles/Vol] 99 mmol/L                 98 - 10  
7   
mmol/L                                  [facility]   
Phone:   
[phone]  
   
                          CO2 [Moles/Vol] 25 mmol/L                 20 - 31   
mmol/L                                  [facility]   
Phone:   
[phone]  
   
                          Creatinine [Mass/Vol] 0.81 mg/dL                0.50 -  
 0.90   
mg/dL                                   [facility]   
Phone:   
[phone]  
   
                                                    Free PSA/Total PSA   
[Mass fraction]     7.8 g/dL                                6.4 - 8.3   
g/dL                                    [facility]   
Phone:   
[phone]  
   
                      GFR  >60                   >60 mL/min [facility]  
y   
Health  
Work   
Phone:   
[phone]  
   
                                                    GFR Non-   
American        >60                             >60 mL/min      [facility]   
Phone:   
[phone]  
   
                          Glucose [Mass/Vol] 86 mg/dL                  70 - 99   
mg/dL                                   [facility]   
Phone:   
[phone]  
   
                          Potassium [Moles/Vol] 3.7 mmol/L                3.7 -   
5.3   
mmol/L                                  [facility]   
Phone:   
[phone]  
   
                          Sodium [Moles/Vol] 137 mmol/L                135 - 144  
   
mmol/L                                  [facility]   
Phone:   
[phone]  
   
                                                    Urea nitrogen (BldV)   
[Mass/Vol]          10 mg/dL                                6 - 20   
mg/dL                                   [facility]   
Phone:   
[phone]  
   
                                                    Urea   
nitrogen/Creatinine   
(Bld) [Mass ratio] 12                                              [facility]  
Work   
Phone:   
[phone]  
   
                                                    Drug Scr, Abuse, Uron 2021   
   
                      Amphetamine(s),Ur Negative   Normal     [facility]  
   
                                        Comment on above:   Performed By: #### C  
OVRB ####  
[facility]  
[address] [place], [place] [address] [phone]  
: Haresh Zimmer MD   
   
                      Barbiturate(s),Ur Negative   Normal     NEG        [facility]  
   
                                        Comment on above:   Performed By: #### C  
OVRB ####  
[facility]  
[address] [place] [address]  
[phone]  
: Haresh Zimmer MD   
   
                      Benzodiazepine(s) Negative   Normal     NEG        [facility]  
   
                                        Comment on above:   Performed By: #### C  
OVRB ####  
[facility]  
[address] [place], [place] [address] [phone]  
: Haresh Zimmer MD   
   
                      Buprenorphrine, Ur Negative   Normal     [facility]  
   
                                        Comment on above:   Performed By: #### C  
OVRB ####  
[facility]  
[address] [place] [address]  
[phone]  
: Haresh Zimmer MD   
   
                      Cannabinoid(s),Ur Positive   Abnormal   NEG        [facility]  
   
                                        Comment on above:   Performed By: #### C  
OVRB ####  
[facility]  
[address] [place] [address]  
[phone]  
: Haresh Zimmer MD   
   
                      Cocaine Metabolite Negative   Normal     [facility]  
   
                                        Comment on above:   Performed By: #### C  
OVRB ####  
[facility]  
[address] [place], [place] [address] [phone]  
: Haresh Zimmer MD   
   
                      Methadone Ql (U) Negative   Normal     [facility]  
   
                                        Comment on above:   Performed By: #### C  
OVRB ####  
[facility]  
[address] [place], [place] [address] [phone]  
: Haresh Zimmer MD   
   
                      Methamphetamine, Ur Negative   Normal     NEG        [facility]  
   
                                        Comment on above:   Performed By: #### C  
OVRB ####  
[facility]  
[address] [place], [place] [address] [phone]  
: Haresh Zimmer MD   
   
                      Opiate(s), Ur Negative   Normal     NEG        [facility]  
   
                                        Comment on above:   Performed By: #### C  
OVRB ####  
[facility]  
[address] [place], [place] [address] [phone]  
: Haresh Zimmer MD   
   
                      Oxycodone, Urine Negative   Normal     NEG        [facility]  
   
                                        Comment on above:   Performed By: #### C  
OVRB ####  
[facility]  
[address] [place] [address] [address] [place], [place] [address] [phone]  
: Haresh Zimmer MD   
   
                      Phencyclidine, Ur Negative   Normal     [facility]  
   
                                        Comment on above:   Performed By: #### C  
OVRB ####  
[facility]  
[address] [place] [address]  
[phone]  
: Haresh Zimmer MD   
   
                      Propoxyphene,Urine Negative   Normal     NEG        [facility]  
   
                                        Comment on above:   Performed By: #### C  
OVRB ####  
[facility]  
[address] [place] [address] [address] [place], [place] [address] [phone]  
: Haresh Zimmer MD   
   
                                                    Tricyclic   
antidepressants Screen   
Ql (U)          Negative        [facility]  
   
                                        Comment on above:   Result Comment: Drug  
 screen results are to be used for medical  
  
purposes only. All positive  
results are unconfirmed. Testing for employment or legal uses   
should be sent  
to a reference laboratory for confirmation.   
   
                                                            Performed By: #### C  
OVRB ####  
[facility]  
[address] [place], [place] [address] [phone]  
: Haresh Zimmer MD   
   
                      Interpretive Info NOT REPORTED Normal                [facility]  
   
                                        Comment on above:   Performed By: #### C  
OVRB ####  
[facility]  
[address] [place], [place] [address] [phone]  
: Haresh Zimmer MD   
   
                      MDMA, Urine NOT REPORTED Normal     NEG        [facility]  
   
                                        Comment on above:   Performed By: #### C  
OVRB ####  
[facility]  
[address] [place] [address] [phone]  
: Haresh Zimmer MD   
   
                                                    EthanolOrdered By: Malika hernadez on 2021   
   
                      Ethanol [Mass/Vol] mg/dL                 <10 mg/dL  [facility]   
Phone:   
[phone]  
   
                      Ethanol percent <0.010                <0.010 %   [facility]   
Phone:   
[phone]  
   
                                                                  [facility]   
Phone:   
[phone]  
   
                                                    Ethanol Alcoholon 2021  
   
   
                      Ethanol [Mass/Vol] mg/dL      Normal     <10        [facility]  
   
                                        Comment on above:   Performed By: #### D  
AU ####  
[facility]  
[address] [place] [address] [phone]  
: Haresh Zimmer MD   
   
                      Ethanol percent <0.010     Normal     <0.010     [facility]  
   
                                        Comment on above:   Performed By: #### D  
AU ####  
[facility]  
[address] [place] [address] [phone]  
: Haresh Zimmer MD   
   
                                                    HCG Qualitative, SerumOrdere  
d By: Malika Shepherd on 2021   
   
                      hCG Qual   Negative              NEGATIVE   [facility]  
[employer]   
Phone:   
[phone]  
   
                                        Comment on above:   Specimens with hCG l  
evels near the threshold of the test (25   
mIU/mL) may give a negative or  
indeterminate result. In such cases, another test should be   
performed with a new specimen  
in 48-72 hours. If early pregnancy is suspected clinically in   
this setting, correlation  
with quantitative serum b-hCG level is suggested.  
  
[employer] has confirmed the use of plasma for this   
test. This has not been cleared  
or approved by the U.S. Food and Drug Administration. The FDA   
has determined that such  
clearance is not necessary.  
  
   
   
                                                                  [employer]   
Phone:   
[phone]  
   
                                                    HCG Screen, Bloodon 20   
   
                      HCG Screen, Blood Negative   Normal     NEG        [facility]  
   
                                        Comment on above:   Result Comment: Spec  
imens with hCG levels near the threshold   
of the test (25 mIU/mL) may give a  
negative or indeterminate result. In such cases, another test   
should be  
performed with a new specimen in 48-72 hours. If early   
pregnancy is suspected  
clinically in this setting, correlation with quantitative   
serum b-hCG level is  
suggested.  
[facility] has confirmed the use of plasma for this   
test. This has not  
been cleared or approved by the U.S. Food and Drug   
Administration. The FDA has  
determined that such clearance is not necessary.   
   
                                                            Performed By: #### C  
DP, SALI, CP, HCG ####  
[facility]  
[address] [place] [address] [phone]  
: Haresh Zimmer MD   
   
                                                    Laboratory - Chemistry and C  
hemistry - challengeOrdered By: Malika Shepherd on   
2021   
   
                                                    GFR/1.73 sq M.predicted   
MDRD (S/P/Bld) [Vol   
rate/Area]                                                      [employer]   
Phone:   
[phone]  
   
                                        Comment on above:   Average GFR for 20-2  
9 years old:  
116 mL/min/1.73sq m  
Chronic Kidney Disease:  
<60 mL/min/1.73sq m  
Kidney failure:  
<15 mL/min/1.73sq m  
  
  
eGFR calculated using average adult body mass. Additional eGFR   
calculator available at:  
  
http://www.[employer]/multiple_crcl_2012.htm  
  
  
   
   
                                                            Stage 1: Some kidney  
 damage normal GFR  
Stage 2: Mild kidney damage GFR 60-89  
Stage 3: Moderate kidney damage GFR 30-59  
Stage 4: Severe kidney damage GFR 15-29  
Stage 5: Severe kidney damage GFR <15  
ESRD - chronic treatment by dialysis or transplant  
  
  
   
   
                                                    Microscopic UrinalysisOrdere  
d By: Malika Shepherd on 2021   
   
                      -                                           [employer]   
Phone:   
[phone]  
   
                      Amorphous, UA NOT REPORTED            None       [employer]  
Work   
Phone:   
[phone]  
   
                      Bacteria, UA TRACE      Abnormal   None       [employer]   
Phone:   
[phone]  
   
                      Casts UA   NOT REPORTED            /LPF       [employer]  
Work   
Phone:   
[phone]  
   
                      Crystals, UA NOT REPORTED            None /HPF  [employer]   
Phone:   
[phone]  
   
                      Epithelial Cells UA 2 TO 5                           [facility]  
   
[employer]   
Phone:   
[phone]  
   
                                                    Interpretation and   
review of laboratory   
results         Abnormal                                        [facility]   
Phone:   
[phone]  
   
                      Mucus, UA  TRACE      Abnormal   None       [facility]   
[employer]   
Phone:   
[phone]  
   
                      Other Observations UA NOT REPORTED            NOT REQ.   M  
[facility]   
[employer]  
Work   
Phone:   
[phone]  
   
                      RBC, UA    0 TO 2                           [facility]   
[employer]  
Work   
Phone:   
[phone]  
   
                      Renal Epithelial, UA NOT REPORTED            0 /HPF     Me  
[facility]   
[employer]  
Work   
Phone:   
[phone]  
   
                      Trichomonas, UA NOT REPORTED            None       [facility]   
[employer]   
Phone:   
[phone]  
   
                      WBC, UA    0 TO 2                           [facility]   
[employer]   
Phone:   
[phone]  
   
                      Yeast, UA  NOT REPORTED            None       [facility]   
[employer]   
Phone:   
[phone]  
   
                                                                  [facility]   
[employer]   
Phone:   
[phone]  
   
                                                    No Panel InformationOrdered   
By: Malika Shepherd on 2021   
   
                                                    Interpretation and   
review of laboratory   
results         Abnormal                                        [facility]   
Phone:   
[phone]  
   
                                                                  [facility]   
[employer]   
Phone:   
[phone]  
   
                                                    SARS-CoV-2on 2021   
   
                                                    SARS-CoV-2 (COVID-19)   
RNA PAMELA+probe Ql (Unsp   
spec)           Not detected    Normal          [facility]  
   
                                        Comment on above:   Result Comment:  
Rapid NAAT: The specimen is NEGATIVE for SARS-CoV-2, the novel   
coronavirus  
associated with COVID-19.  
The ID NOW COVID-19 assay is designed to detect the virus that   
causes COVID-19  
in patients with signs and symptoms of infection who are   
suspected of COVID-19.  
An individual without symptoms of COVID-19 and who is not   
shedding SARS-CoV-2  
virus would expect to have a negative (not detected) result in   
this assay.  
Negative results should be treated as presumptive and, if   
inconsistent with  
clinical signs and symptoms or necessary for patient   
management,  
should be tested with an alternative molecular assay. Negative   
results do not  
preclude SARS-CoV-2 infection and  
should not be used as the sole basis for patient management   
decisions.  
Fact sheet for Healthcare Providers:   
https://www.fda.gov/media/701329/download  
Fact sheet for Patients:   
https://www.fda.gov/media/668010/download  
Methodology: Isothermal Nucleic Acid Amplification   
   
                                                            Performed By: #### C  
OVRB ####  
[facility]  
[address] [place] [address] [phone]  
: Haresh Zimmer MD   
   
                                                    Salicylateon 2021   
   
                      Salicylate <1         Low        3-10       [facility]  
   
                                        Comment on above:   Performed By: #### C  
DP, SALI, CP, HCG ####  
[facility]  
[address] [place] [address] [phone]  
: Haresh Zimmer MD   
   
                                                    SalicylateOrdered By: Malika matta on 2021   
   
                          Salicylate Lvl <1           Low          3 - 10   
mg/dL                                   [facility]  
Work   
Phone:   
[phone]  
   
                                                    UA w/Reflex Cultureon 2021   
   
                      Bilirubin, SemiQt,Ur Negative   Normal     NEG        [facility]  
   
                                        Comment on above:   Performed By: #### C  
OVRB ####  
[facility]  
[address] [place] [address] [phone]  
: Haresh Zimmer MD   
   
                      Blood, Urine Negative   Normal     NEG        [facility]  
   
                                        Comment on above:   Performed By: #### C  
OVRB ####  
[facility]  
[address] [place] [address] [phone]  
: Haresh Zimmer MD   
   
                      Clarity (U) CLEAR      Normal     CLEAR      [facility]  
   
                                        Comment on above:   Performed By: #### C  
OVRB ####  
[facility]  
[address] [place] [address] [phone]  
: Haresh Zimmer MD   
   
                      Color (U)  YELLOW     Normal     YEL        [facility]  
   
                                        Comment on above:   Performed By: #### C  
OVRB ####  
[facility]  
[address] [place] [address] [phone]  
: Haresh Zimmer MD   
   
                      Glucose Ql (U) Negative   Normal     NEG        [facility]  
   
                                        Comment on above:   Performed By: #### C  
OVRB ####  
[facility]  
[address] [place] [address] [address] [place], [place] [address] [phone]  
: Haresh Zimmer MD   
   
                      Ketones Ql (U) Negative   Normal     NEG        [facility]  
   
                                        Comment on above:   Performed By: #### C  
OVRB ####  
[facility]  
[address] [place] [address] [address] [place], [place] [address] [phone]  
: Haresh Zimmer MD   
   
                                                    Leukocyte esterase Test   
strip Ql (U)    Negative        Normal          NEG             [facility]  
   
                                        Comment on above:   Performed By: #### C  
OVRB ####  
[address] [place], [place] [address] [phone]  
: Haresh Zimmer MD   
   
                      Nitrite,Ur Negative   Normal     NEG        [facility]  
   
                                        Comment on above:   Performed By: #### C  
OVRB ####  
[facility]  
[address] [place] [address] [address] [place], [place] [address] [phone]  
: Haresh Zimmer MD   
   
                      PH,Ur      8.5        Normal     5.0-9.0    [facility]  
   
                                        Comment on above:   Performed By: #### C  
OVRB ####  
[address] [place], [place] [address] [phone]  
: Haresh Zimmer MD   
   
                      Protein Ql (U) Negative   Normal     [facility]  
   
                                        Comment on above:   Performed By: #### C  
OVRB ####  
[facility]  
[address] [place] [address] [address] [place], [place] [address] [phone]  
: Haresh Zimmer MD   
   
                      Spec. Gravity,Ur 1.015      Normal     1.010-1.020 [facility]  
   
                                        Comment on above:   Performed By: #### C  
OVRB ####  
[address] [place] [address] [phone]  
: Haresh Zimmer MD   
   
                      Urobilinogen,Ur Normal     Normal     NORM       [facility]  
   
                                        Comment on above:   Performed By: #### C  
OVRB ####  
[facility]  
[address] [place] [address] [phone]  
: Haresh Zimmer MD   
   
                      Comment    NOT REPORTED Normal                [facility]  
   
                                        Comment on above:   Performed By: #### C  
OVRB ####  
[facility]  
[address] [place] [address] [phone]  
: Haresh Zimmer MD   
   
                                                    Urinalysis Reflex to Culture  
Ordered By: Malika Shepherd on 2021   
   
                      Bilirubin Urine Negative              NEGATIVE   [facility]   
[employer]   
Phone:   
[phone]  
   
                      Color, UA  YELLOW                YELLOW     [facility]   
[employer]  
Work   
Phone:   
[phone]  
   
                      Glucose, Ur Negative              NEGATIVE   [facility]   
[employer]   
Phone:   
[phone]  
   
                      Ketones Ql (U) Negative              NEGATIVE   [facility]   
[employer]   
Phone:   
[phone]  
   
                                                    Leukocyte esterase Test   
strip Ql (U)    Negative                        NEGATIVE        [facility]   
[employer]   
Phone:   
[phone]  
   
                      Nitrite, Urine Negative              NEGATIVE   [facility]   
[employer]   
Phone:   
[phone]  
   
                      pH, UA     8.5                              [facility]   
[employer]  
Work   
Phone:   
[phone]  
   
                      Protein, UA Negative              NEGATIVE   [facility]   
[employer]   
Phone:   
[phone]  
   
                      Specific Marlow, UA 1.015                            [facility]   
[employer]   
Phone:   
[phone]  
   
                      Turbidity UA CLEAR                 CLEAR      [facility]   
[employer]   
Phone:   
[phone]  
   
                      Urinalysis Comments NOT REPORTED                       [facility]  
   
[employer]  
Work   
Phone:   
[phone]  
   
                      Urine Hgb  Negative              NEGATIVE   [facility]   
[employer]   
Phone:   
[phone]  
   
                      Urobilinogen, Urine Normal                Normal     [facility]  
   
[employer]   
Phone:   
[phone]  
   
                                                                  [facility]   
[employer]  
Work   
Phone:   
[phone]  
   
                                                    Urinalysis,Microon   
1   
   
                      -----                 Normal                [facility]  
   
                                        Comment on above:   Performed By: #### C  
OVRB ####  
[facility]  
[address] [place] [address] [phone]  
: Haresh Zimmer MD   
   
                      Bacteria   TRACE      Abnormal   [facility]  
   
                                        Comment on above:   Performed By: #### C  
OVRB ####  
[facility]  
[address] [place] [address] [address] [place], [place] [address] [phone]  
: Haresh Zimmer MD   
   
                                                    Epithelial cells LM Ql   
(Urine sed)     2 TO 5          Normal          0-25            [facility]  
   
                                        Comment on above:   Performed By: #### C  
OVRB ####  
[facility]  
[address] [place], [place] [address] [phone]  
: Haresh Zimmer MD   
   
                      Mucus Strands TRACE      Abnormal   [facility]  
   
                                        Comment on above:   Performed By: #### C  
OVRB ####  
[address] [place], [place] [address] [phone]  
: Haresh Zimmer MD   
   
                      Urine RBC's 0 TO 2     Normal     0-2        [facility]  
   
                                        Comment on above:   Performed By: #### C  
OVRB ####  
[facility]  
[address] [place] [address] [address] [place], [place] [address] [phone]  
: Haresh Zimmer MD   
   
                      Urine WBC's 0 TO 2     Normal     0-5        [facility]  
   
                                        Comment on above:   Performed By: #### C  
OVRB ####  
[address] [place], [place] [address] [phone]  
: Haresh Zimmer MD   
   
                                                    Amorphous sediment LM   
Ql (Urine sed)  NOT REPORTED    Normal          [facility]  
   
                                        Comment on above:   Performed By: #### C  
OVRB ####  
[facility]  
[address] [place] [address] [address] [place], [place] [address] [phone]  
: Haresh Zimmer MD   
   
                      Casts      NOT REPORTED Normal                [facility]  
   
                                        Comment on above:   Performed By: #### C  
OVRB ####  
[facility]  
[address] [place] [address] [place] [place], [place] [address] [phone]  
: Haresh Zimmer MD   
   
                                                    Crystals LM Nom (Urine   
sed)            NOT REPORTED    Normal          [facility]  
   
                                        Comment on above:   Performed By: #### C  
OVRB ####  
[facility]  
[address] [place] [address]  
[phone]  
: Haresh Zimmer MD   
   
                      Epithelial, Renal NOT REPORTED Normal     0          [facility]  
   
                                        Comment on above:   Performed By: #### C  
OVRB ####  
[facility]  
[address] [place], [place] [id number] [phone]  
: Haresh Zimmer MD   
   
                      Other Observations NOT REPORTED Normal     NREQ       [facility]  
   
                                        Comment on above:   Performed By: #### C  
OVRB ####  
[facility]  
[address] [place] [address]  
[phone]  
: Haresh Zimmer MD   
   
                      Trichomonas NOT REPORTED Normal     NONE       [facility]  
   
                                        Comment on above:   Performed By: #### C  
OVRB ####  
[facility]  
[address] [place], [place] [address] [phone]  
: Haresh Zimmer MD   
   
                      Yeast      NOT REPORTED Normal     NONE       [facility]  
   
                                        Comment on above:   Performed By: #### C  
OVRB ####  
[facility]  
[address] [place], [place] [id number] [phone]  
: Haresh Zimmer MD   
   
                                                    Urine Drug ScreenOrdered By:  
 Malika Shepherd on 2021   
   
                      Amphetamine Screen, Ur Negative              NEGATIVE   [facility]   
[employer]  
Work   
Phone:   
[phone]  
   
                      Barbiturate Screen, Ur Negative              NEGATIVE   [facility]  
Work   
Phone:   
[phone]  
   
                                                    Benzodiazepine Screen,   
Urine           Negative                        NEGATIVE        [facility]  
Work   
Phone:   
[phone]  
   
                      Buprenorphine Urine Negative              NEGATIVE   [facility]   
Phone:   
[phone]  
   
                      Cannabinoid Scrn, Ur Positive   Abnormal   NEGATIVE   [facility]   
Health  
Work   
Phone:   
[phone]  
   
                                                    Cocaine Metabolite,   
Urine           Negative                        NEGATIVE        [facility]  
Work   
Phone:   
[phone]  
   
                                                    Interpretation and   
review of laboratory   
results         Abnormal                                        [facility]   
[employer]   
Phone:   
[phone]  
   
                      MDMA, Urine NOT REPORTED            NEGATIVE   [facility]  
[employer]   
Phone:   
[phone]  
   
                      Methadone Screen, Urine Negative              NEGATIVE   M  
[facility]   
[employer]  
Work   
Phone:   
[phone]  
   
                      Methamphetamine, Urine Negative              NEGATIVE   Me  
[facility]   
[employer]  
Work   
Phone:   
[phone]  
   
                      Opiates, Urine Negative              NEGATIVE   [facility]   
[employer]  
Work   
Phone:   
[phone]  
   
                      Oxycodone Screen, Ur Negative              NEGATIVE   [facility]   
Health  
Work   
Phone:   
[phone]  
   
                      Phencyclidine, Urine Negative              NEGATIVE   [facility]   
Health  
Work   
Phone:   
[phone]  
   
                      Propoxyphene, Urine Negative              NEGATIVE   [facility]  
   
[employer]  
Work   
Phone:   
[phone]  
   
                      Test Information NOT REPORTED                       [facility]   
[employer]   
Phone:   
[phone]  
   
                                                    Tricyclic   
Antidepressants, Urine Negative                        NEGATIVE        [facility]   
[employer]   
Phone:   
[phone]  
   
                                        Comment on above:   Drug screen results   
are to be used for medical purposes only.   
All positive results are  
unconfirmed. Testing for employment or legal uses should be   
sent to a reference laboratory  
for confirmation.  
  
   
   
                                                                  [facility]   
Phone:   
[phone]  
   
                                                    Laboratory - Microbiology an  
d Antimicrobial susceptibilityOrdered By: Doreen Cabrera on   
2021   
   
                                                    SARS-CoV-2 (COVID-19)   
RNA PAMELA+probe Ql (Resp) Not detected                            (Not   
Detected )                              Health   
[facility]  
Work   
Phone:   
[phone]  
   
                                        Comment on above:   Note: This nucleic a  
mallorie amplification test was developed and   
itsperformance characteristics determined by   
[facility]. Nucleic acid amplification tests include   
RT-PCR and TMA. This test has not been FDA cleared orapproved.   
This test has been authorized by FDA under anEmergency Use   
Authorization (EUA). This test is onlyauthorized for the   
duration of time the declaration thatcircumstances exist   
justifying the authorization of theemergency use of in vitro   
diagnostic tests for detection [id number] virus and/or   
diagnosis of COVID-19 infectionunder section 564(b)(1) of the   
Act, 21 U.S.C. 360bbb-3(b)(1), unless the authorization is   
terminated or revokedsooner.When diagnostic testing is   
negative, the possibility of afalse negative result should be   
considered in the contextof a patient's recent exposures and   
the presence ofclinical signs and symptoms consistent with   
COVID-19. Anindividual without symptoms of COVID-19 and who is   
notshedding SARS-CoV-2 virus would expect to have a   
negative(not detected) result in this assay.   
   
                                                    SARS-CoV-2 (COVID-19)   
RNA PAMELA+probe Ql (Unsp   
spec)           Performed                                       Health   
[facility]  
Work   
Phone:   
[phone]  
   
                                                    Vitamin Don 2021   
   
                      Vitamin D  14.7 ng/mL Low        30.0-100.0 [facility]  
   
                                        Comment on above:   Result Comment: (<20  
 ng/mL) Deficiency  
This assay accurately quantifies the sum of vitamin D3,   
25-Hydroxy and  
vitamin D2, 25-Hyroxy.   
   
                                                            Performed By: #### V  
ITD ####  
[facility]  
[address] [place] [address]  
[phone]   
   
                                                    CBC With Platelet No Differe  
ntialon 2021   
   
                                                    Erythrocyte   
distribution width   
(RBC) [Ratio]   13.6 %          Normal          11.5-14.5       [facility]  
   
                                        Comment on above:   Performed By: #### C  
BCND ####  
[facility]  
[address] [place] [address]  
[phone]   
   
                                                    Hematocrit (Bld)   
[Volume fraction] 43.5 %          Normal          37.0-47.0       [facility]  
   
                                        Comment on above:   Performed By: #### C  
BCND ####  
[facility]  
[address] [place] [address]  
[phone]   
   
                                                    Hemoglobin (Bld)   
[Mass/Vol]      14.6 g/dL       Normal          12.0-16.0       [facility]  
   
                                        Comment on above:   Performed By: #### C  
BCND ####  
[facility]  
[address] [place] [address]  
[phone]   
   
                                                    MCH (RBC) [Entitic   
mass]           28.6 pg         Normal          27.0-31.3       [facility]  
   
                                        Comment on above:   Performed By: #### C  
BCND ####  
[facility]  
[address] [place] [address]  
[phone]   
   
                      MCHC       33.5 %     Normal     33.0-37.0  [facility]  
   
                                        Comment on above:   Performed By: #### C  
BCND ####  
[facility]  
[address] [place] [address]  
[phone]   
   
                      MCV (RBC) [Entitic vol] 85.4 fL    Normal     82.0-100.0 M  
[facility]  
   
                                        Comment on above:   Performed By: #### C  
BCND ####  
[facility]  
[address] [place] [address]  
[phone]   
   
                      Platelets (Bld) [#/Vol] 284 10*3/uL Normal     130-400      
[facility]  
   
                                        Comment on above:   Performed By: #### C  
BCND ####  
[facility]  
[address] [place] [address]  
[phone]   
   
                      RBC (Bld) [#/Vol] 5.10 10*6/uL Normal     4.20-5.40  [facility]  
   
                                        Comment on above:   Performed By: #### C  
BCND ####  
[facility]  
[address] [place] [address]  
[phone]   
   
                      WBC (Bld) [#/Vol] 11.1 10*3/uL Critically high 4.8-10.8     
[facility]  
   
                                        Comment on above:   Performed By: #### C  
BCND ####  
[facility]  
[address] [place] [address]  
[phone]   
   
                                                    Folateon 2021   
   
                      Folate     15.3 ng/mL Normal     7.3-26.1   [facility]  
   
                                        Comment on above:   Result Comment: As o  
f 8/10/16, the methodology has changed.   
Results from  
this methodology should not be compared with results from  
previous methodology.   
   
                                                            Performed By: #### F  
OLAT ####  
[facility]  
[address] [place] [address]  
[phone]   
   
                                                    Lipid Panelon 2021   
   
                      Cholesterol [Mass/Vol] 127 mg/dL  Normal     0-199      [facility]  
   
                                        Comment on above:   Result Comment: ATP   
III Cholesterol classification is   
Desirable.   
   
                                                            Performed By: #### L  
IPID ####  
[facility]  
[address] [place] [address]  
[phone]   
   
                                                    Cholesterol in HDL   
[Mass/Vol]      32 mg/dL        Low             40-59           [facility]  
   
                                        Comment on above:   Result Comment: ATP   
III HDL Cholestrol Classification is low.  
Expected Values:  
Males: >55 = No Risk  
35-55 = Moderate Risk  
<35 = High Risk  
Females: >65 = No Risk  
45-65 = Moderate Risk  
<45 = High Risk  
NCEP Guidelines: Third Report May 2001  
>59 = negative risk factor for CHD  
<40 = major risk factor for CHD   
   
                                                            Performed By: #### L  
IPID ####  
[facility]  
[address] [place] [address]  
[phone]   
   
                                                    Cholesterol in LDL   
[Mass/Vol]      75 mg/dL        Normal          0-129           [facility]  
   
                                        Comment on above:   Result Comment: ATP   
III LDL Classification is Optimal.   
   
                                                            Performed By: #### L  
IPID ####  
[facility]  
[address] [place] [address]  
[phone]   
   
                      Triglyceride [Mass/Vol] 100 mg/dL  Normal     0-150      M  
[facility]  
   
                                        Comment on above:   Result Comment: ATP   
III Triglycerides Classification is   
Normal.   
   
                                                            Performed By: #### L  
IPID ####  
[facility]  
[address] [place] [address]  
[phone]   
   
                                                    TSH w/out Reflexon    
   
                      TSH w/out Reflex 1.020 uIU/mL Normal     0.440-3.86 [facility]  
   
                                        Comment on above:   Performed By: #### T  
SH ####  
[facility]  
[address] [place] [address]  
[phone]   
   
                                                    Vitamin B12on 2021   
   
                                                    Cobalamin (Vitamin B12)   
[Mass/Vol]      1050 pg/mL      Normal          232-1245        [facility]  
   
                                        Comment on above:   Performed By: #### B  
12 ####  
[facility]  
[address] [place] [address]  
[phone]   
   
                                                    EKG 12 LeadOrdered By: Cuca Gallagher on 2021   
   
                      Atrial Rate 75                    BPM        [employer]   
Phone:   
[phone]  
   
                      P Axis     24                    degrees    [facility]   
Phone:   
[phone]  
   
                      P-R Interval 140 ms                           [employer]   
Phone:   
[phone]  
   
                      Q-T Interval 384 ms                           [employer]   
Phone:   
[phone]  
   
                      QRS Duration 90 ms                            [employer]   
Phone:   
[phone]  
   
                                                    QTc Calculation   
(Bazett)        414 ms                                          [employer]   
Phone:   
[phone]  
   
                      R Axis     29                    degrees    [employer]   
Phone:   
[phone]  
   
                      T Axis     20                    degrees    [employer]   
Phone:   
[phone]  
   
                      Ventricular Rate 70                    BPM        [employer]   
Phone:   
[phone]  
   
                                                            Normal sinus rhythm   
with   
sinus arrhythmia  
Possible Anterior infarct   
, age undetermined  
Abnormal ECG  
No previous ECGs   
available  
Confirmed by KRANTHI SU ([id number]) on   
2021 12:40:27 AM  
                                                            [employer]   
Phone:   
[phone]  
   
                                                            [place] Incoming E  
kg   
Results From [facility] -   
2021 12:40 AM EDT   
Formatting of this note   
might be different from   
the original.  
Normal sinus rhythm with   
sinus arrhythmia  
Possible Anterior infarct   
, age undetermined  
Abnormal ECG  
No previous ECGs   
available  
Confirmed by KRANTHI SU ([id number]) on   
2021 12:40:27 AM                                         [employer]   
Phone:   
[phone]  
   
                                                                  [employer]   
Phone:   
[phone]  
   
                                                    Acetaminophenon 2021   
   
                                                    Acetaminophen   
[Mass/Vol]      ug/mL           Low             10-30           [facility]  
   
                                        Comment on above:   Performed By: #### A  
LCB, ACET ####  
[facility]  
[address] [place] [address] [place] [address] [phone]  
: Haresh Zimmer MD   
   
                                                    Acetaminophen LevelOrdered B  
y: Rafael Gallagher on 2021   
   
                          Acetaminophen Level <5           Low          10 - 30   
ug/mL                                   [employer]   
Phone:   
[phone]  
   
                                                    Interpretation and   
review of laboratory   
results         Abnormal                                        [employer]   
Phone:   
[phone]  
   
                                                                  [employer]   
Phone:   
[phone]  
   
                                                    CBC Auto DifferentialOrdered  
 By: Rafael Gallagher on 2021   
   
                      Absolute Eos # 0.07                             [employer]   
Phone:   
[phone]  
   
                                                    Absolute Immature   
Granulocyte     0.03                                            [employer]   
Phone:   
[phone]  
   
                      Absolute Lymph # 2.20                             [employer]   
Phone:   
[phone]  
   
                      Absolute Mono # 0.76                             [employer]   
Phone:   
[phone]  
   
                      Basophils (Bld) [#/Vol] 0.05 10*3/uL                        
 [employer]   
Phone:   
[phone]  
   
                      Basophils/100 WBC (Bld) 0 %                   0 - 2 %    M  
[facility]   
Phone:   
[phone]  
   
                      Differential Type NOT REPORTED                       [employer]   
Phone:   
[phone]  
   
                                                    Eosinophils/100 WBC   
(Bld)           1 %                             1 - 4 %         [employer]   
Phone:   
[phone]  
   
                                                    Hematocrit (Bld)   
[Volume fraction]   45.2 %                                  36.3 - 47.1   
%                                       [employer]   
Phone:   
[phone]  
   
                                                    Hemoglobin.gastrointest  
inal spec 1 Ql (Stl) 15.0 g/dL                               11.9 - 15.1   
g/dL                                    [employer]   
Phone:   
[phone]  
   
                                                    Immature   
granulocytes/100 WBC   
(Bld)           0 %                             0               [employer]   
Phone:   
[phone]  
   
                                                    Interpretation and   
review of laboratory   
results         Abnormal                                        [employer]   
Phone:   
[phone]  
   
                                                    Lymphocytes/100 WBC   
(Bld)           18 %            Low             24 - 43 %       [employer]   
Phone:   
[phone]  
   
                                                    MCH (RBC) [Entitic   
mass]               28.6 pg                                 25.2 - 33.5   
pg                                      [employer]   
Phone:   
[phone]  
   
                          MCHC (RBC) [Mass/Vol] 33.2 g/dL                 28.4 -  
 34.8   
g/dL                                    [employer]   
Phone:   
[phone]  
   
                          MCV (RBC) [Entitic vol] 86.3 fL                   82.6  
 -   
102.9 fL                                [employer]   
Phone:   
[phone]  
   
                      Monocytes/100 WBC (Bld) 6 %                   3 - 12 %   M  
[facility]   
Phone:   
[phone]  
   
                          NRBC Automated 0.0                       0.0 per 100   
WBC                                     [employer]   
Phone:   
[phone]  
   
                                                    Platelet distribution   
width (Bld) [Ratio] 13.0 %                                  11.8 - 14.4   
%                                       [employer]   
Phone:   
[phone]  
   
                      Platelet Estimate NOT REPORTED                       [employer]   
Phone:   
[phone]  
   
                                                    Platelet mean volume   
(Bld) [Entitic vol] 9.1 fL                                  8.1 - 13.5   
fL                                      [employer]   
Phone:   
[phone]  
   
                      Platelets (Bld) [#/Vol] 296 10*3/uL                         
[employer]   
Phone:   
[phone]  
   
                          RBC (Bld) [#/Vol] 5.24 10*6/uL High         3.95 - 5.1  
1   
m/uL                                    [employer]   
Phone:   
[phone]  
   
                      RBC (Bld) [#/Vol] NOT REPORTED                       [employer]   
Phone:   
[phone]  
   
                                                    Segmented   
neutrophils/100 WBC   
(Bld)           75 %            High            36 - 65 %       [employer]   
Phone:   
[phone]  
   
                      Segs Absolute 9.07       High                  [employer]   
Phone:   
[phone]  
   
                      WBC (Bld) [#/Vol] 12.2 10*3/uL High                  [employer]   
Phone:   
[phone]  
   
                      WBC (Bld) [#/Vol] NOT REPORTED                       [employer]   
Phone:   
[phone]  
   
                                                                  [employer]   
Phone:   
[phone]  
   
                                                    CBC with Diffon 2021   
   
                      Abs. Basophil 0.05 k/uL  Normal     0.00-0.20  [facility]  
   
                                        Comment on above:   Performed By: #### C  
OVRB ####  
[facility]  
[address] [place] [address]  
[phone]  
: Haresh Zimmer MD   
   
                      Abs.Imm.Granulocyte 0.03 k/uL  Normal     0.00-0.30  [facility]  
   
                                        Comment on above:   Performed By: #### C  
OVRB ####  
[facility]  
[address] [place], [place] [address] [phone]  
: Haresh Zimmer MD   
   
                      Abs.Neutrophil (Seg) 9.07 k/uL  High       1.50-8.10  [facility]  
   
                                        Comment on above:   Performed By: #### C  
OVRB ####  
[facility]  
[address] [place] [address] [address] [place] [id number] [phone]  
: Haresh Zimmer MD   
   
                      Basophils/100 WBC (Bld) 0 %        Normal     0-2        [facility]  
   
                                        Comment on above:   Performed By: #### C  
OVRB ####  
[facility]  
[address] [place] [address] [address]. [place] [id number] [phone]  
: Haresh Zimmer MD   
   
                                                    Eosinophils (Bld)   
[#/Vol]         0.07 10*3/uL    Normal          0.00-0.44       [facility]  
   
                                        Comment on above:   Performed By: #### C  
OVRB ####  
[address] [place] [facility] [phone]  
: Haresh Zimmer MD   
   
                                                    Eosinophils/100 WBC   
(Bld)           1 %             Normal          1-4             [facility]  
   
                                        Comment on above:   Performed By: #### C  
OVRB ####  
[address] [place], [place] [address]83 [phone]  
: Haresh Zimmer MD   
   
                                                    Erythrocyte   
distribution width   
(RBC) [Ratio]   13.0 %          Normal          11.8-14.4       [facility]  
   
                                        Comment on above:   Performed By: #### C  
OVRB ####  
[address] [place] 39[address]83 [phone]  
: Haresh Zimmer MD   
   
                                                    Hematocrit (Bld)   
[Volume fraction] 45.2 %          Normal          36.3-47.1       [facility]  
   
                                        Comment on above:   Performed By: #### C  
OVRB ####  
[address] [place], [place] [address]83 [phone]  
: Haresh Zimmer MD   
   
                                                    Hemoglobin (Bld)   
[Mass/Vol]      15.0 g/dL       Normal          11.9-15.1       [facility]  
   
                                        Comment on above:   Performed By: #### C  
OVRB ####  
[facility]  
[address] [place], [place] 28[address]483 [phone]  
: Haresh Zimmer MD   
   
                                                    Immature   
granulocytes/100 WBC   
(Bld)           0 %             Normal          0               [facility]  
   
                                        Comment on above:   Performed By: #### C  
OVRB ####  
[facility]  
[address] [place], [facility] [phone]  
: Haresh Zimmer MD   
   
                                                    Lymphocytes (Bld)   
[#/Vol]         2.20 10*3/uL    Normal          1.10-3.70       [facility]  
   
                                        Comment on above:   Performed By: #### C  
OVRB ####  
[address] [place], [place] [address]683 [phone]  
: Haresh Zimmer MD   
   
                                                    Lymphocytes/100 WBC   
(Bld)           18 %            Low             24-43           [facility]  
   
                                        Comment on above:   Performed By: #### C  
OVRB ####  
[address] [place], [place] [address]683 [phone]  
: Haresh Zimmer MD   
   
                                                    MCH (RBC) [Entitic   
mass]           28.6 pg         Normal          25.2-33.5       [facility]  
   
                                        Comment on above:   Performed By: #### C  
OVRB ####  
[address] [place], [facility] [phone]  
: Haresh Zimmer MD   
   
                      MCHC (RBC) [Mass/Vol] 33.2 g/dL  Normal     28.4-34.8  [facility]  
   
                                        Comment on above:   Performed By: #### C  
OVRB ####  
[address] [place] [address] [phone]  
: Haresh Zimmer MD   
   
                      MCV (RBC) [Entitic vol] 86.3 fL    Normal     82.6-102.9 M  
[facility]  
   
                                        Comment on above:   Performed By: #### C  
OVRB ####  
[facility]  
[address] [place] [address] [phone]  
: Haresh Zimmer MD   
   
                      Monocytes (Bld) [#/Vol] 0.76 10*3/uL Normal     0.10-1.20   
 [facility]  
   
                                        Comment on above:   Performed By: #### C  
OVRB ####  
[facility]  
[address] [place], [place] [address]83 [phone]  
: Haresh Zimmer MD   
   
                      Monocytes/100 WBC (Bld) 6 %        Normal     3-12       M  
[facility]  
   
                                        Comment on above:   Performed By: #### C  
OVRB ####  
[facility]  
[address] [place], [place] 18[address]483 [phone]  
: Haresh Zimmer MD   
   
                      Neutrophil (Seg) 75 %       High       36-65      [facility]  
   
                                        Comment on above:   Performed By: #### C  
OVRB ####  
[facility]  
[address] [place], [place] [address]83 [phone]  
: Haresh Zimmer MD   
   
                      NRBC Automated 0.0 per 100 WBC Normal     0.0        [facility]  
   
                                        Comment on above:   Performed By: #### C  
OVRB ####  
[address] [place], [place] [address]83 [phone]  
: Haresh Zimmer MD   
   
                                                    Platelet mean volume   
(Bld) [Entitic vol] 9.1 fL          Normal          8.1-13.5        [facility]  
   
                                        Comment on above:   Performed By: #### C  
OVRB ####  
[facility]  
[address] [place], [place] 8[address]2383 [phone]  
: Haresh Zimmer MD   
   
                      Platelets (Bld) [#/Vol] 296 10*3/uL Normal     138-453      
[facility]  
   
                                        Comment on above:   Performed By: #### C  
OVRB ####  
[facility]  
[address] [place], [place] 418[address]83 [phone]  
: Haresh Zimmer MD   
   
                      RBC (Bld) [#/Vol] 5.24 10*6/uL High       3.95-5.11  [facility]  
   
                                        Comment on above:   Performed By: #### C  
OVRB ####  
[facility]  
[address] [place] [address]  
[phone]  
: Haresh Zimmer MD   
   
                      WBC (Bld) [#/Vol] 12.2 10*3/uL High       3.5-11.3   [facility]  
   
                                        Comment on above:   Performed By: #### C  
OVRB ####  
[facility]  
[address] [place] [address]  
[phone]  
: Haresh Zimmer MD   
   
                      Auto Diff Performed NOT REPORTED Normal                [facility]  
   
                                        Comment on above:   Performed By: #### C  
OVRB ####  
[address] [place] [address]  
[phone]  
: Haresh Zimmer MD   
   
                      Platelet Estimate NOT REPORTED Normal                [facility]  
   
                                        Comment on above:   Performed By: #### C  
OVRB ####  
[facility]  
[address] [place] [address] [place] [address]  
[phone]  
: Haresh Zimmer MD   
   
                                                    RBC morphology finding   
Nom (Bld)       NOT REPORTED    Normal                          [facility]  
   
                                        Comment on above:   Performed By: #### C  
OVRB ####  
[address] [place] [address]  
[phone]  
: Haresh Zimmer MD   
   
                      WBC Morphology NOT REPORTED Normal                [facility]  
   
                                        Comment on above:   Performed By: #### C  
OVRB ####  
[facility]  
[address] [place] [address] [place] [address]  
[phone]  
: Haresh Zimmer MD   
   
                                                    COVID-19, RapidOrdered By: PIPO Gallagher on 2021   
   
                                                    SARS-CoV-2 (COVID-19)   
RNA PAMELA+probe Ql (Unsp   
spec)               Not detected                            Not   
Detected                                [facility]  
Work   
Phone:   
[phone]  
   
                                        Comment on above:     
Rapid NAAT: The specimen is NEGATIVE for SARS-CoV-2, the novel   
coronavirus associated with  
COVID-19.  
  
The ID NOW COVID-19 assay is designed to detect the virus that   
causes COVID-19 in patients  
with signs and symptoms of infection who are suspected of   
COVID-19.  
An individual without symptoms of COVID-19 and who is not   
shedding SARS-CoV-2 virus would  
expect to have a negative (not detected) result in this assay.  
Negative results should be treated as presumptive and, if   
inconsistent with clinical signs  
and symptoms or necessary for patient management,  
should be tested with an alternative molecular assay. Negative   
results do not preclude  
SARS-CoV-2 infection and  
should not be used as the sole basis for patient management   
decisions.  
  
Fact sheet for Healthcare Providers:   
https://www.fda.gov/media/589075/download  
Fact sheet for Patients:   
https://www.fda.gov/media/959869/download  
  
Methodology: Isothermal Nucleic Acid Amplification  
  
   
   
                      Specimen Description .NASOPHARYNGEAL SWAB                   
      [facility]  
[employer]   
Phone:   
[phone]  
   
                                                                  [facility]  
[employer]   
Phone:   
[phone]  
   
                                                    Comp Metabolic Profon 2021   
   
                      (cont.)               Normal                [facility]  
   
                                        Comment on above:   Result Comment: Aver  
age GFR for 20-29 years old:  
116 mL/min/1.73sq m  
Chronic Kidney Disease:  
<60 mL/min/1.73sq m  
Kidney failure:  
<15 mL/min/1.73sq m  
eGFR calculated using average adult body mass. Additional eGFR   
calculator  
available at:  
http://www.[employer]/multiple_crcl_2012.htm   
   
                                                            Performed By: #### D  
AU ####  
[facility]  
[address] [place] [address] [phone]  
: Haresh Zimmer MD   
   
                      Albumin [Mass/Vol] 4.5 g/dL   Normal     3.5-5.2    [facility]  
   
                                        Comment on above:   Performed By: #### D  
AU ####  
[facility]  
[address] [place] [address] [phone]  
: Haresh Zimmer MD   
   
                      Albumin/Glob Ratio 1.6        Normal     1.0-2.5    [facility]  
   
                                        Comment on above:   Performed By: #### D  
AU ####  
[facility]  
[address] [place], [place] 1998[address] [phone]  
: Haresh Zimmer MD   
   
                      Alkaline Phos 72 U/L     Normal          [facility]  
   
                                        Comment on above:   Performed By: #### D  
AU ####  
[facility]  
[address] [place], [place] 6[address]4083 [phone]  
: Haresh Zimmer MD   
   
                                                    ALT [Catalytic   
activity/Vol]   71 U/L          High            5-33            [facility]  
   
                                        Comment on above:   Performed By: #### D  
AU ####  
[facility]  
[address] [place], [place] [address]483 [phone]  
: Haresh Zimmer MD   
   
                      Anion gap [Moles/Vol] 10 mmol/L  Normal     9-17       [facility]  
   
                                        Comment on above:   Performed By: #### D  
AU ####  
[facility]  
[address] [place], [place] [address]683 [phone]  
: Haresh Zimmer MD   
   
                                                    AST [Catalytic   
activity/Vol]   35 U/L          High            <32             [facility]  
   
                                        Comment on above:   Performed By: #### D  
AU ####  
[facility]  
[address] [place], [place] [address]883 [phone]  
: Haresh Zimmer MD   
   
                      Bilirubin [Mass/Vol] 0.34 mg/dL Normal     0.3-1.2    [facility]  
   
                                        Comment on above:   Performed By: #### D  
AU ####  
[facility]  
[address] [place], [place] 1350283 [phone]  
: Haresh Zimmer MD   
   
                      BUN/CRE Ratio 14         Normal     9-20       [facility]  
   
                                        Comment on above:   Performed By: #### D  
AU ####  
[facility]  
[address] [place], [place] [address]83 [phone]  
: Haresh Zimmer MD   
   
                      Calcium [Mass/Vol] 10.3 mg/dL Normal     8.6-10.4   [facility]  
   
                                        Comment on above:   Performed By: #### D  
AU ####  
[facility]  
[address] [place], [place] [address]883 [phone]  
: Haresh Zimmer MD   
   
                      Chloride [Moles/Vol] 103 mmol/L Normal          [facility]  
   
                                        Comment on above:   Performed By: #### D  
AU ####  
[facility]  
[address] [place], [place] 217[address]83 [phone]  
: Haresh Zimmer MD   
   
                      CO2 [Moles/Vol] 23 mmol/L  Normal     20-31      [facility]  
   
                                        Comment on above:   Performed By: #### D  
AU ####  
[facility]  
[address] [place], [place] 9576183 [phone]  
: Haresh Zimmer MD   
   
                      Creatinine [Mass/Vol] 0.65 mg/dL Normal     0.50-0.90  [facility]  
   
                                        Comment on above:   Performed By: #### D  
AU ####  
[facility]  
[address] [place], [place] [address]83 [phone]  
: Haresh Zimmer MD   
   
                      GFR, Amer >60        Normal     >60        [facility]  
   
                                        Comment on above:   Performed By: #### D  
AU ####  
[facility]  
[address] [place], [place] 3809783 [phone]  
: Haresh Zimmer MD   
   
                      GFR,non  Amer >60        Normal     >60        [facility]  
   
                                        Comment on above:   Performed By: #### D  
AU ####  
[facility]  
[address] [place], [place] [address]383 [phone]  
: Haresh Zimmer MD   
   
                      Glucose [Mass/Vol] 84 mg/dL   Normal     70-99      [facility]  
   
                                        Comment on above:   Performed By: #### D  
AU ####  
[facility]  
[address] [place], [place] 577[address]83 [phone]  
: Haresh Zimmer MD   
   
                      Potassium [Moles/Vol] 4.2 mmol/L Normal     3.7-5.3    [facility]  
   
                                        Comment on above:   Performed By: #### D  
AU ####  
[facility]  
[address] [place] [address] [place] [place], [place] [address]83 [phone]  
: Haresh Zimmer MD   
   
                      Protein [Mass/Vol] 7.4 g/dL   Normal     6.4-8.3    [facility]  
   
                                        Comment on above:   Performed By: #### D  
AU ####  
[facility]  
[address] [place] [address] [phone]  
: Haresh Zimmer MD   
   
                      Sodium [Moles/Vol] 136 mmol/L Normal     135-144    [facility]  
   
                                        Comment on above:   Performed By: #### D  
AU ####  
[address] [place] [place] [address] [phone]  
: Haresh Zimmer MD   
   
                      Staging:              Normal                [facility]  
   
                                        Comment on above:   Result Comment: Stag  
e 1: Some kidney damage normal GFR  
Stage 2: Mild kidney damage GFR 60-89  
Stage 3: Moderate kidney damage GFR 30-59  
Stage 4: Severe kidney damage GFR 15-29  
Stage 5: Severe kidney damage GFR <15  
ESRD - chronic treatment by dialysis or transplant   
   
                                                            Performed By: #### D  
AU ####  
[address] [place] [address] [phone]  
: Haresh Zimmer MD   
   
                                                    Urea nitrogen   
[Mass/Vol]      9 mg/dL         Normal          6-20            [facility]  
   
                                        Comment on above:   Performed By: #### D  
AU ####  
[address] [place] [address] [phone]  
: Haresh Zimmer MD   
   
                                                    Comprehensive Metabolic Pane  
lOrdered By: Rafael Gallagher on 2021   
   
                          Albumin [Mass/Vol] 4.5 g/dL                  3.5 - 5.2  
   
g/dL                                    [facility]  
Work   
Phone:   
[phone]  
   
                                                    Albumin/Globulin [Mass   
ratio]          1.6 {ratio}                                     [facility]  
[employer]   
Phone:   
[phone]  
   
                                                    ALP (Bld) [Catalytic   
activity/Vol]       72 U/L                                  35 - 104   
U/L                                     [facility]  
Work   
Phone:   
[phone]  
   
                                                    ALT [Catalytic   
activity/Vol]   71 U/L          High            5 - 33 U/L      [facility] [facility]   
Phone:   
[phone]  
   
                          Anion gap [Moles/Vol] 10 mmol/L                 9 - 17  
   
mmol/L                                  [facility]   
Phone:   
[phone]  
   
                                                    AST [Catalytic   
activity/Vol]   35 U/L          High            <32             [facility]   
Phone:   
[phone]  
   
                          Bilirubin [Mass/Vol] 0.34 mg/dL                0.3 - 1  
.2   
mg/dL                                   [facility]   
Phone:   
[phone]  
   
                          Calcium [Mass/Vol] 10.3 mg/dL                8.6 - 10.  
4   
mg/dL                                   [facility]   
Phone:   
[phone]  
   
                          Chloride [Moles/Vol] 103 mmol/L                98 - 10  
7   
mmol/L                                  [facility]   
Phone:   
[phone]  
   
                          CO2 [Moles/Vol] 23 mmol/L                 20 - 31   
mmol/L                                  [facility]   
Phone:   
[phone]  
   
                          Creatinine [Mass/Vol] 0.65 mg/dL                0.50 -  
 0.90   
mg/dL                                   [facility]   
Phone:   
[phone]  
   
                                                    Free PSA/Total PSA   
[Mass fraction]     7.4 g/dL                                6.4 - 8.3   
g/dL                                    [facility]   
Phone:   
[phone]  
   
                      GFR  >60                   >60 mL/min [facility]  
y   
Health  
Work   
Phone:   
[phone]  
   
                                                    GFR Non-   
American        >60                             >60 mL/min      [facility]   
Phone:   
[phone]  
   
                          Glucose [Mass/Vol] 84 mg/dL                  70 - 99   
mg/dL                                   [facility]   
Phone:   
[phone]  
   
                          Potassium [Moles/Vol] 4.2 mmol/L                3.7 -   
5.3   
mmol/L                                  [facility] [facility]   
Phone:   
[phone]  
   
                          Sodium [Moles/Vol] 136 mmol/L                135 - 144  
   
mmol/L                                  [facility]   
Phone:   
[phone]  
   
                                                    Urea nitrogen (BldV)   
[Mass/Vol]          9 mg/dL                                 6 - 20   
mg/dL                                   [facility]  
Work   
Phone:   
[phone]  
   
                                                    Urea   
nitrogen/Creatinine   
(Bld) [Mass ratio] 14                                              [facility]  
[employer]   
Phone:   
[phone]  
   
                                                    Drug Scr, Abuse, Uron 2021   
   
                      Amphetamine(s),Ur Negative   Normal     NEG        [facility]  
   
                                        Comment on above:   Performed By: #### C  
OVRB ####  
[facility]  
[address] [place] [address] [place] [address] [phone]  
: Haresh Zimmer MD   
   
                      Barbiturate(s),Ur Negative   Normal     [facility]  
   
                                        Comment on above:   Performed By: #### C  
OVRB ####  
[address] [place], [place] [address]83 [phone]  
: Haresh Zimmer MD   
   
                      Benzodiazepine(s) Negative   Normal     NEG        [facility]  
   
                                        Comment on above:   Performed By: #### C  
OVRB ####  
[address] [place], [place] [address]683 [phone]  
: Haresh Zimmer MD   
   
                      Buprenorphrine, Ur Negative   Normal     [facility]  
   
                                        Comment on above:   Performed By: #### C  
OVRB ####  
[address] [place], [place] 237[address]83 [phone]  
: Haresh Zimmer MD   
   
                      Cannabinoid(s),Ur Positive   Abnormal   NEG        [facility]  
   
                                        Comment on above:   Performed By: #### C  
OVRB ####  
[facility]  
[address] [place] [address] [place] [address] [phone]  
: Haresh Zimmer MD   
   
                      Cocaine Metabolite Negative   Normal     [facility]  
   
                                        Comment on above:   Performed By: #### C  
OVRB ####  
[address] [place] [address] [phone]  
: Haresh Zimmer MD   
   
                      Methadone Ql (U) Negative   Normal     [facility]  
   
                                        Comment on above:   Performed By: #### C  
OVRB ####  
[facility]  
[address] [place] [address] [place] [place], [place] [address] [phone]  
: Haresh Zimmer MD   
   
                      Methamphetamine, Ur Negative   Normal     NEG        [facility]  
   
                                        Comment on above:   Performed By: #### C  
OVRB ####  
[facility]  
[address] [place], [place] [address] [phone]  
: Haresh Zimmer MD   
   
                      Opiate(s), Ur Negative   Normal     NEG        [facility]  
   
                                        Comment on above:   Performed By: #### C  
OVRB ####  
[facility]  
[address] [place], [place] [address] [phone]  
: Haresh Zimmer MD   
   
                      Oxycodone, Urine Negative   Normal     NEG        [facility]  
   
                                        Comment on above:   Performed By: #### C  
OVRB ####  
[facility]  
[address] [place], [place] [address] [phone]  
: Haresh Zimmer MD   
   
                      Phencyclidine, Ur Negative   Normal     NEG        [facility]  
   
                                        Comment on above:   Performed By: #### C  
OVRB ####  
[facility]  
[address] [place], [place] [address] [phone]  
: Haresh Zimmer MD   
   
                      Propoxyphene,Urine Negative   Normal     [facility]  
   
                                        Comment on above:   Performed By: #### C  
OVRB ####  
[facility]  
[address] [place], [place] [address] [phone]  
: Haresh Zimmer MD   
   
                                                    Tricyclic   
antidepressants Screen   
Ql (U)          Negative        Normal          [facility]  
   
                                        Comment on above:   Result Comment: Drug  
 screen results are to be used for medical  
  
purposes only. All positive  
results are unconfirmed. Testing for employment or legal uses   
should be sent  
to a reference laboratory for confirmation.   
   
                                                            Performed By: #### C  
OVRB ####  
[facility]  
[address] [place] [address] [phone]  
: Haresh Zimmer MD   
   
                      Interpretive Info NOT REPORTED [facility]  
   
                                        Comment on above:   Performed By: #### C  
OVRB ####  
[facility]  
[address] [place] [address] [phone]  
: Haresh Zimmer MD   
   
                                                    Drug Scr, Abuse, UrOrdered B  
y: Rafael Gallagher on 2021   
   
                      MDMA, Urine NOT REPORTED Normal     NEG        [facility]   
Phone:   
[phone]  
   
                                        Comment on above:   Performed By: #### C  
OVRB ####  
[address] [place] [address] [phone]  
: Haresh Zimmer MD   
   
                                                    EthanolOrdered By: Rafael valdivia on 2021   
   
                      Ethanol [Mass/Vol] mg/dL                 <10 mg/dL  [facility]   
Phone:   
[phone]  
   
                      Ethanol percent <0.010                <0.010 %   [facility]   
Phone:   
[phone]  
   
                                                                  [facility]   
Phone:   
[phone]  
   
                                                    Ethanol Alcoholon 2021  
   
   
                      Ethanol [Mass/Vol] mg/dL      Normal     <10        [facility]  
   
                                        Comment on above:   Performed By: #### A  
[facility] ####  
[address] [place] [address] [phone]  
: Haresh Zimmer MD   
   
                      Ethanol percent <0.010     Normal     <0.010     [facility]  
   
                                        Comment on above:   Performed By: #### A  
[facility] ####  
[address] [place] [address] [phone]  
: Haresh Zimmer MD   
   
                                                    HCG Qualitative, SerumOrdere  
d By: Rafael Charlesmarianne on 2021   
   
                      hCG Qual   Negative              NEGATIVE   [facility]   
Phone:   
[phone]  
   
                                        Comment on above:   Specimens with hCG l  
evels near the threshold of the test (25   
mIU/mL) may give a negative or  
indeterminate result. In such cases, another test should be   
performed with a new specimen  
in 48-72 hours. If early pregnancy is suspected clinically in   
this setting, correlation  
with quantitative serum b-hCG level is suggested.  
  
[facility] has confirmed the use of plasma for this   
test. This has not been cleared  
or approved by the U.S. Food and Drug Administration. The FDA   
has determined that such  
clearance is not necessary.  
  
   
   
                                                                  [facility]   
Phone:   
[phone]  
   
                                                    HCG Screen, Bloodon 20  
21   
   
                      HCG Screen, Blood Negative   Normal     NEG        [facility]  
   
                                        Comment on above:   Result Comment: Spec  
imens with hCG levels near the threshold   
of the test (25 mIU/mL) may give a  
negative or indeterminate result. In such cases, another test   
should be  
performed with a new specimen in 48-72 hours. If early   
pregnancy is suspected  
clinically in this setting, correlation with quantitative   
serum b-hCG level is  
suggested.  
[facility] has confirmed the use of plasma for this   
test. This has not  
been cleared or approved by the U.S. Food and Drug   
Administration. The FDA has  
determined that such clearance is not necessary.   
   
                                                            Performed By: #### D  
AU ####  
[facility]  
[address] [place] [address] [place] [address]  
[phone]  
: Haresh Zimmer MD   
   
                                                    Laboratory - Chemistry and C  
hemistry - challengeOrdered By: Rafael Gallagher on   
2021   
   
                                                    GFR/1.73 sq M.predicted   
MDRD (S/P/Bld) [Vol   
rate/Area]                                                      [facility]   
Phone:   
[phone]  
   
                                        Comment on above:   Average GFR for 20-2  
9 years old:  
116 mL/min/1.73sq m  
Chronic Kidney Disease:  
<60 mL/min/1.73sq m  
Kidney failure:  
<15 mL/min/1.73sq m  
  
  
eGFR calculated using average adult body mass. Additional eGFR   
calculator available at:  
  
http://www.[employer]/multiple_crcl_2012.htm  
  
  
   
   
                                                            Stage 1: Some kidney  
 damage normal GFR  
Stage 2: Mild kidney damage GFR 60-89  
Stage 3: Moderate kidney damage GFR 30-59  
Stage 4: Severe kidney damage GFR 15-29  
Stage 5: Severe kidney damage GFR <15  
ESRD - chronic treatment by dialysis or transplant  
  
  
   
   
                                                    No Panel InformationOrdered   
By: Rafael Gallagher on 2021   
   
                                                    Interpretation and   
review of laboratory   
results         Abnormal                                        [facility]   
Phone:   
[phone]  
   
                                                                  [facility]   
Phone:   
[phone]  
[phone]  
   
                                                    SARS-CoV-2on 2021   
   
                                                    SARS-CoV-2 (COVID-19)   
RNA PAMELA+probe Ql (Unsp   
spec)           Not detected    Normal          NOTDET          [facility]  
   
                                        Comment on above:   Result Comment:  
Rapid NAAT: The specimen is NEGATIVE for SARS-CoV-2, the novel   
coronavirus  
associated with COVID-19.  
The ID NOW COVID-19 assay is designed to detect the virus that   
causes COVID-19  
in patients with signs and symptoms of infection who are   
suspected of COVID-19.  
An individual without symptoms of COVID-19 and who is not   
shedding SARS-CoV-2  
virus would expect to have a negative (not detected) result in   
this assay.  
Negative results should be treated as presumptive and, if   
inconsistent with  
clinical signs and symptoms or necessary for patient   
management,  
should be tested with an alternative molecular assay. Negative   
results do not  
preclude SARS-CoV-2 infection and  
should not be used as the sole basis for patient management   
decisions.  
Fact sheet for Healthcare Providers:   
https://www.fda.gov/media/224065/download  
Fact sheet for Patients:   
https://www.fda.gov/media/224259/download  
Methodology: Isothermal Nucleic Acid Amplification   
   
                                                            Performed By: #### C  
OVRB ####  
[facility]  
[address] [place] [address] [phone]  
: Haresh Zimmer MD   
   
                                                    SPECIMEN REJECTIONOrdered By  
: Rafael Gallagher on 2021   
   
                      -          NOT REPORTED                       [facility]   
Phone:   
[phone]  
   
                      Ordered Test CDP                              [facility]   
Phone:   
[phone]  
   
                                        Reason for Rejection Unable to perform   
testing: No specimen   
received.                                                   [facility]   
Phone:   
[phone]  
   
                                                    Specimen source Nom   
(Unsp spec)     .BLOOD                                          [facility]   
Phone:   
[phone]  
   
                                                                  [facility]   
Phone:   
[phone]  
   
                                                    Salicylateon 2021   
   
                      Salicylate <1         Low        3-10       [facility]  
   
                                        Comment on above:   Performed By: #### D  
AU ####  
[facility]  
[address] [place] [address] [phone]  
: Haresh Zimmer MD   
   
                                                    SalicylateOrdered By: Rafael Gallagher on 2021   
   
                          Salicylate Lvl <1           Low          3 - 10   
mg/dL                                   [facility]  
Work   
Phone:   
[phone]  
   
                                                    Specimen Rejectionon   
021   
   
                                        Reason for rejection Unable to perform   
testing: No specimen   
received.           Normal                                  [facility]  
   
                                        Comment on above:   Performed By: #### C  
OVRB ####  
[facility]  
[address] [place], [place] [address] [phone]  
: Haresh Zimmer MD   
   
                      Source of sample .BLOOD     Normal                [facility]  
   
                                        Comment on above:   Performed By: #### C  
OVRB ####  
[facility]  
[address] [place], [place] [address]83 [phone]  
: Haresh Zimmer MD   
   
                      Test ordered CDP        Normal                [facility]  
   
                                        Comment on above:   Performed By: #### C  
OVRB ####  
[facility]  
[address] [place], [place] [address]83 [phone]  
: Haresh Zimmer MD   
   
                      -----      NOT REPORTED Normal                [facility]  
   
                                        Comment on above:   Performed By: #### C  
OVRB ####  
[facility]  
[address] [place], [place] [address]83 [phone]  
: Haresh Zimmer MD   
   
                                                    Urinalysis w/ Microon 2021   
   
                      -----                 Normal                [facility]  
   
                                        Comment on above:   Performed By: #### C  
OVRB ####  
[facility]  
[address] [place], [place] [address] [phone]  
: Haresh Zimmer MD   
   
                      Bacteria   1+         Abnormal   NONE       [facility]  
   
                                        Comment on above:   Performed By: #### C  
OVRB ####  
[facility]  
[address] [place] [address] [phone]  
: Haresh Zimmer MD   
   
                      Bilirubin, SemiQt,Ur Negative   Normal     NEG        [facility]  
   
                                        Comment on above:   Performed By: #### C  
OVRB ####  
[facility]  
[address] [place], [place] [address] [phone]  
: Haresh Zimmer MD   
   
                      Blood, Urine Negative   Normal     NEG        [facility]  
   
                                        Comment on above:   Performed By: #### C  
OVRB ####  
[facility]  
[address] [place], [place] [address] [phone]  
: Haresh Zimmer MD   
   
                      Clarity (U) CLEAR      Normal     CLEAR      [facility]  
   
                                        Comment on above:   Performed By: #### C  
OVRB ####  
[facility]  
[address] [place], [place] [address] [phone]  
: Haresh Zimmer MD   
   
                      Color (U)  YELLOW     Normal     YEL        [facility]  
   
                                        Comment on above:   Performed By: #### C  
OVRB ####  
[facility]  
[address] [place], [place] [address] [phone]  
: Haresh Zimmer MD   
   
                                                    Epithelial cells LM Ql   
(Urine sed)     5 TO 10         Normal          0-25            [facility]  
   
                                        Comment on above:   Performed By: #### C  
OVRB ####  
[facility]  
[address] [place], [place] [address] [phone]  
: Haresh Zimmer MD   
   
                      Glucose Ql (U) Negative   Normal     NEG        [facility]  
   
                                        Comment on above:   Performed By: #### C  
OVRB ####  
[facility]  
[address] [place], [place] [address] [phone]  
: Haresh Zimmer MD   
   
                      Ketones Ql (U) Negative   Normal     NEG        [facility]  
   
                                        Comment on above:   Performed By: #### C  
OVRB ####  
[facility]  
[address] [place], [place] [address] [phone]  
: Haresh Zimmer MD   
   
                                                    Leukocyte esterase Test   
strip Ql (U)    Negative        Normal          NEG             [facility]  
   
                                        Comment on above:   Performed By: #### C  
OVRB ####  
[facility]  
[address] [place] [address] [phone]  
: Haresh Zimmer MD   
   
                      Nitrite,Ur Negative   Normal     NEG        [facility]  
   
                                        Comment on above:   Performed By: #### C  
OVRB ####  
[facility]  
[address] [place], [place] [address]83 [phone]  
: Haresh Zimmer MD   
   
                      PH,Ur      5.5        Normal     5.0-9.0    [facility]  
   
                                        Comment on above:   Performed By: #### C  
OVRB ####  
[facility]  
[address] [place], [place] [address]383 [phone]  
: Haresh Zimmer MD   
   
                      Protein Ql (U) Negative   Normal     NEG        [facility]  
   
                                        Comment on above:   Performed By: #### C  
OVRB ####  
[facility]  
[address] [place], [place] [address] [phone]  
: Haresh Zimmer MD   
   
                      Spec. Gravity,Ur 1.025      High       1.010-1.020 [facility]  
   
                                        Comment on above:   Performed By: #### C  
OVRB ####  
[facility]  
[address] [place], [place] [address] [phone]  
: Haresh Zimmer MD   
   
                      Urine RBC's None       Normal     0-2        [facility]  
   
                                        Comment on above:   Performed By: #### C  
OVRB ####  
[facility]  
[address] [place], [place] [address]83 [phone]  
: Haresh Zimmer MD   
   
                      Urine WBC's 0 TO 2     Normal     0-5        [facility]  
   
                                        Comment on above:   Performed By: #### C  
OVRB ####  
[facility]  
[address] [place], [place] [address] [phone]  
: Haresh Zimmer MD   
   
                      Urobilinogen,Ur Normal     Normal     NORM       [facility]  
   
                                        Comment on above:   Performed By: #### C  
OVRB ####  
[facility]  
[address] [place], [place] [address]83 [phone]  
: Haresh Zimmer MD   
   
                                                    Amorphous sediment LM   
Ql (Urine sed)  NOT REPORTED    Normal          NONE            [facility]  
   
                                        Comment on above:   Performed By: #### C  
OVRB ####  
[facility]  
[address] [place], [place] [address] [phone]  
: Haresh Zimmer MD   
   
                      Casts      NOT REPORTED Normal                [facility]  
   
                                        Comment on above:   Performed By: #### C  
OVRB ####  
[facility]  
[address] [place], [place] [address] [phone]  
: Haresh Zimmer MD   
   
                      Comment    NOT REPORTED Normal                [facility]  
   
                                        Comment on above:   Performed By: #### C  
OVRB ####  
[facility]  
[address] [place], [place] [address]383 [phone]  
: Haresh Zimmer MD   
   
                                                    Crystals LM Nom (Urine   
sed)            NOT REPORTED    Normal          [facility]  
   
                                        Comment on above:   Performed By: #### C  
OVRB ####  
[facility]  
[address] [place], [place] [address]857383 [phone]  
: Haresh Zimmer MD   
   
                      Epithelial, Renal NOT REPORTED Normal     0          [facility]  
   
                                        Comment on above:   Performed By: #### C  
OVRB ####  
[facility]  
[address] [place] [address]  
[phone]  
: Haresh Zimmer MD   
   
                      Mucus Strands NOT REPORTED Normal     [facility]  
   
                                        Comment on above:   Performed By: #### C  
OVRB ####  
[facility]  
[address] [place], [place] [address]83 [phone]  
: Haresh Zimmer MD   
   
                      Other Observations NOT REPORTED Normal     NREQ       [facility]  
   
                                        Comment on above:   Performed By: #### C  
OVRB ####  
[facility]  
[address] [place], [place] [address] [phone]  
: Haresh Zimmer MD   
   
                      Trichomonas NOT REPORTED Normal     [facility]  
   
                                        Comment on above:   Performed By: #### C  
OVRB ####  
[facility]  
[address] [place] [address] [phone]  
: Haresh Zimmer MD   
   
                      Yeast      NOT REPORTED Normal     NONE       [facility]  
   
                                        Comment on above:   Performed By: #### C  
OVRB ####  
[facility]  
[address] [place] [address] [phone]  
: Haresh Zimmer MD   
   
                                                    Urinalysis with microscopicO  
rdered By: Seth Rahman on 2021   
   
                      -                                           [employer]  
Work   
Phone:   
[phone]  
   
                      Amorphous, UA NOT REPORTED            None       [employer]  
Work   
Phone:   
[phone]  
   
                      Bacteria, UA 1+         Abnormal   None       [facility] [facility]   
Phone:   
[phone]  
   
                      Bilirubin Urine Negative              NEGATIVE   [employer] [facility]   
Phone:   
[phone]  
   
                      Casts UA   NOT REPORTED            /LPF       [employer]  
Work   
Phone:   
[phone]  
   
                      Color, UA  YELLOW                YELLOW     [employer]  
Work   
Phone:   
[phone]  
   
                      Crystals, UA NOT REPORTED            None /HPF  [employer]  
Work   
Phone:   
[phone]  
   
                      Epithelial Cells UA 5 TO 10                          [employer]  
Work   
Phone:   
[phone]  
   
                      Glucose, Ur Negative              NEGATIVE   [employer]  
Work   
Phone:   
[phone]  
   
                                                    Interpretation and   
review of laboratory   
results         Abnormal                                        [employer]  
Work   
Phone:   
[phone]  
   
                      Ketones Ql (U) Negative              NEGATIVE   [facility]   
Phone:   
[phone]  
   
                                                    Leukocyte esterase Test   
strip Ql (U)    Negative                        NEGATIVE        [facility] [facility]   
Phone:   
[phone]  
   
                      Mucus, UA  NOT REPORTED            None       [employer]  
Work   
Phone:   
[phone]  
   
                      Nitrite, Urine Negative              NEGATIVE   [facility] [facility]   
Phone:   
[phone]  
   
                      Other Observations UA NOT REPORTED            NOT REQ.   M  
[facility]  
Work   
Phone:   
[phone]  
   
                      pH, UA     5.5                              [facility]  
Work   
Phone:   
[phone]  
   
                      Protein, UA Negative              NEGATIVE   [employer]   
Phone:   
[phone]  
   
                      RBC, UA    None                             [employer] [facility]  
Work   
Phone:   
[phone]  
   
                      Renal Epithelial, UA NOT REPORTED            0 /HPF     [facility]  
[facility]  
Work   
Phone:   
[phone]  
   
                      Specific Marlow, UA 1.025      High                  [facility]  
[facility]   
Health  
Work   
Phone:   
[phone]  
   
                      Trichomonas, UA NOT REPORTED            None       [facility]   
[facility]  
Work   
Phone:   
[phone]  
   
                      Turbidity UA CLEAR                 CLEAR      [facility]   
Health  
Work   
Phone:   
[phone]  
   
                      Urinalysis Comments NOT REPORTED                       [facility]   
Health  
Work   
Phone:   
[phone]  
   
                      Urine Hgb  Negative              NEGATIVE   [facility]   
[facility]  
Work   
Phone:   
[phone]  
   
                      Urobilinogen, Urine Normal                Normal     [facility]  
   
Health  
Work   
Phone:   
[phone]  
   
                      WBC, UA    0 TO 2                           [facility]   
[facility]  
Work   
Phone:   
[phone]  
   
                      Yeast, UA  NOT REPORTED            None       [facility]   
Health  
Work   
Phone:   
[phone]  
   
                                                                  [facility]   
Health  
Work   
Phone:   
[phone]  
   
                                                    Urine Drug ScreenOrdered By:  
 Rafael Gallagher on 2021   
   
                      Amphetamine Screen, Ur Negative              NEGATIVE   [facility]  
[facility]   
Health  
Work   
Phone:   
[phone]  
   
                      Barbiturate Screen, Ur Negative              NEGATIVE   [facility]  
[facility]   
Health  
Work   
Phone:   
[phone]  
   
                                                    Benzodiazepine Screen,   
Urine           Negative                        NEGATIVE        [facility]   
[facility]  
Work   
Phone:   
[phone]  
   
                      Buprenorphine Urine Negative              NEGATIVE   [facility]  
   
Health  
Work   
Phone:   
[phone]  
   
                      Cannabinoid Scrn, Ur Positive   Abnormal   NEGATIVE   [facility]  
[facility]   
Health  
Work   
Phone:   
[phone]  
   
                                                    Cocaine Metabolite,   
Urine           Negative                        NEGATIVE        [facility]   
[facility]  
Work   
Phone:   
[phone]  
   
                                                    Interpretation and   
review of laboratory   
results         Abnormal                                        [facility]   
[facility]  
Work   
Phone:   
[phone]  
   
                      Methadone Screen, Urine Negative              NEGATIVE   [facility]  
[facility]   
[facility]  
Work   
Phone:   
[phone]  
   
                      Methamphetamine, Urine Negative              NEGATIVE   [facility]  
[facility]   
Health  
Work   
Phone:   
[phone]  
   
                      Opiates, Urine Negative              NEGATIVE   [facility]   
[facility]  
Work   
Phone:   
[phone]  
   
                      Oxycodone Screen, Ur Negative              NEGATIVE   [facility]  
[facility]   
Health  
Work   
Phone:   
[phone]  
   
                      Phencyclidine, Urine Negative              NEGATIVE   [facility]  
[facility]   
Health  
Work   
Phone:   
[phone]  
   
                      Propoxyphene, Urine Negative              NEGATIVE   [facility]  
   
Health  
Work   
Phone:   
[phone]  
   
                      Test Information NOT REPORTED                       [employer]   
Phone:   
[phone]  
   
                                                    Tricyclic   
Antidepressants, Urine Negative                        NEGATIVE        [employer]   
Phone:   
[phone]  
   
                                        Comment on above:   Drug screen results   
are to be used for medical purposes only.   
All positive results are  
unconfirmed. Testing for employment or legal uses should be   
sent to a reference laboratory  
for confirmation.  
  
   
   
                                                                  [employer]   
Phone:   
[phone]  
   
                                                    COVID-19 PCRon 2020   
   
                          SARS-CoV-2, PAMELA Not Detected Normal       Not   
Detected                                The   
[facility]  
   
                                        Comment on above:   Result Comment: This  
 test was developed and its performance   
characteristics determined  
by [employer]. This test has not been FDA cleared or  
approved. This test has been authorized by FDA under an   
Emergency Use  
Authorization (EUA). This test is only authorized for the   
duration of  
time the declaration that circumstances exist justifying the  
authorization of the emergency use of in vitro diagnostic   
tests for  
detection of SARS-CoV-2 virus and/or diagnosis of COVID-19   
infection  
under section 564(b)(1) of the Act, 21 U.S.C. 360bbb-3(b)(1),   
unless  
the authorization is terminated or revoked sooner.  
When diagnostic testing is negative, the possibility of a   
false  
negative result should be considered in the context of a   
patient's  
recent exposures and the presence of clinical signs and   
symptoms  
consistent with COVID-19. An individual without symptoms of   
COVID-19  
and who is not shedding SARS-CoV-2 virus would expect to have   
a  
negative (not detected) result in this assay.   
   
                                                            Performed By: #### C  
VDPCR ####  
[facility]  
[address] [place] [id number]  
Sosa Multani   
   
                                                    CBC/PLATELET & AUTO DIFFEREN  
TIALon 2017   
   
                                                    Basophils Auto #/vol   
(Bld)           0.0 10*3/uL     Normal          0.0-0.1         [facility]  
   
                                                    Basophils/100 WBC Auto   
(Bld)           0 %             Normal          0-1             [facility]  
   
                                                    CBC/PLATELET & AUTO   
DIFFERENTIAL    0.0 10*3/uL     Normal          -               [facility]  
   
                                        Comment on above:   Result Comment: er 7  
   
   
                      Eosinophils 0.0 10*3/uL Normal     0.0-0.4    [facility]  
   
                                                    Eosinophils/100   
leukocytes      0 %             Normal          0-5             [facility]  
   
                                                    Erythrocyte   
distribution width Auto   
Ratio (RBC)     12.9 %          Normal          11.7-14.4       [facility]  
   
                      Erythrocytes (RBC) 4.79 10*6/uL Normal     4.20-5.40  [facility]  
   
                      Hematocrit (HCT) 40.0 %     Normal     37.0-47.0  [facility]  
   
                                                    Hemoglobin mass conc   
(Bld)           14.0 g/dL       Normal          11.5-16.0       [facility]  
   
                      Lymphocytes 1.8 10*3/uL Normal     0.9-2.5    [facility]  
   
                                                    Lymphocytes/100   
leukocytes      10 %            Low             13-44           [facility]  
   
                      MCH        29.2 pg    Normal     27.0-31.0  [facility]  
   
                      MCHC mass conc (RBC) 35.0 g/dL  Normal     31.5-36.0  [facility]  
   
                      MCV        83.5 fL    Normal     82.0-101.0 [facility]  
   
                      Monocytes  1.0 10*3/uL Normal     0.1-1.0    [facility]  
   
                                                    Monocytes/100   
leukocytes      6 %             Normal          5-9             [facility]  
   
                      Neutrophils 14.5 10*3/uL High       2.1-6.5    [facility]  
   
                                                    Neutrophils/100   
leukocytes      83 %            High            39-75           [facility]  
   
                      Nucleated erythrocytes 0 %        Normal     -          [facility]  
   
                                                    Platelet mean volume   
(PMV)           8.5 fL          Normal          8.2-11.4        [facility]  
   
                      Platelets  239.0 10*3/uL Normal     130.0-400.0 [facility]  
   
                      WBC (Leukocytes) 17.6 10*3/uL High       4.4-10.2   [facility]  
   
                                                    CHEST PORTABLEon 2017   
   
                                        CHEST PORTABLE      EXAM: Portable   
chest.CLINICAL STATEMENT:   
Syncopal episode   
today.COMPARISON:   
2017.TECHNIQUE:   
Single mobile AP upright   
view chest at 3:21   
PM.FINDINGS: Heart size   
within normal limits.   
There is no mediastinal   
widening.The lung fields   
and pleural spaces are   
clear.IMPRESSION:No acute   
change.Dictated   
Physician: Je BellDictated On: 2017   
15:30Interpreted By:   
Je BellTranscribed By: Gunnar Belligned By: Je BellSigned On:   
2017 15:30    Normal                                  [facility]  
   
                                                    HCG URINEon 2017   
   
                                                    HCG.beta subunit   
(pregnancy test) Ql (U) Negative        Normal          Negative        [facility]  
   
                                                    HCG.beta subunit   
(pregnancy test) Ql (U) SEE NOTE        Normal          -               [facility]  
   
                                        Comment on above:   Result Comment: er 7  
   
   
                                                    M I PANELon 2017   
   
                      Anion gap  12.8 mmol/L Normal     -          [facility]  
   
                      BUN/Creatinine Ratio 10.2 mg/mg Normal     [facility]  
   
                      Calcium    8.8 mg/dL  Normal     8.5-10.1   [facility]  
   
                      Chloride   107 mmol/L Normal          [facility]  
   
                      CO2        27.1 mmol/L Normal     21.0-32.0  [facility]  
   
                      Creatinine 0.98 mg/dL Normal     0.55-1.02  [facility]  
   
                      eGFR (non-black) mL/min/{1.73_m2} Normal     >60        [facility]  
   
                      Glucose mass conc 87 mg/dL   Normal          [facility]  
   
                      M I PANEL  SEE NOTE   Normal     -          [facility]  
   
                                        Comment on above:   Result Comment: er 7  
   
   
                      Magnesium  1.9 mg/dL  Normal     1.8-2.4    [facility]  
   
                      Osmolality 283 mosm/kg [facility]  
   
                      Potassium molar conc 3.9 mmol/L Normal     3.5-5.1    [facility]  
   
                      Sodium     143 mmol/L Normal     136-145    [facility]  
   
                                                    Troponin I.cardiac mass   
conc            ng/mL           Normal          0.000-0.056     [facility]  
   
                      Urea nitrogen 10 mg/dL   Normal     7-18       [facility]  
   
                                                    PT PROTIMEon 2017   
   
                      INR Coag RelTime (PPP) 1.0 {INR}  Normal     -          [facility]  
   
                                                    Prothrombin time (PT)   
Coag time (PPP) 10.5 s          Normal          9.3-11.7        [facility]  
   
                      PT PROTIME SEE NOTE   Normal     [facility]  
   
                                        Comment on above:   Result Comment: er 7  
   
   
                                                    PTTon 2017   
   
                      aPTT       24.8 s     Normal     24.5-32.7  [facility]  
   
                                        Comment on above:   Result Comment: er 7  
   
   
                                                    URINALYSIS W/ MICROSCOPIC if  
 indicatedon 2017   
   
                      Bilirubin Ql (U) Negative   Normal     Negative   [facility]  
   
                      Blood      Moderate   Abnormal   Negative   [facility]  
   
                      Blood product type Clean catch Normal     -          [facility]  
   
                      Glucose mass conc Normal     Normal     Normal     [facility]  
   
                      Protein    Moderate   Abnormal   Negative   [facility]  
   
                      Sq. Epithelial Cells 3 /[HPF]   Abnormal   None seen  [facility]  
   
                                                    URINALYSIS W/   
MICROSCOPIC if   
indicated       0 /[HPF]        Normal          0-2             [facility]  
   
                                        Comment on above:   Result Comment: er 7  
   
   
                      Urine, appearance CLEAR      Normal     Clear      [facility]  
   
                                                    Urine, bacteria in   
sediment        1 /[HPF]        Abnormal        None seen       [facility]  
   
                      Urine, color YELLOW     Normal     -          [facility]  
   
                      Urine, ketones presence Negative   Normal     Negative   [facility]  
[facility]  
   
                      Urine, nitrite presence Positive   Abnormal   Negative   [facility]  
[facility]  
   
                      Urine, pH  7.0 [pH]   Normal     5.0 - 9.0  [facility]  
   
                          Urine, specific gravity 1.010        Normal       1.01  
0 -   
1.030                                   [facility]  
   
                      Urine, urobilinogen Normal     Normal     Normal     [facility]  
   
                      WBC (Leukocytes) 6 /[HPF]   Abnormal   0-5        [facility]  
   
                      WBC (Leukocytes) Moderate   Abnormal   Negative   [facility]  
  
  
  
Vital Signs  
  
  
                          Date Time    Vital Sign   Value        Performing   
Clinician                               Facility  
   
                                                    2024   
16:          Body height         162.56 cm           Doreen Deborah LARISSA  
Work Phone:   
[phone]                          [facility]  
Work Phone:   
[phone]  
   
                                                    2024   
16:                              Body mass index   
(BMI) [Ratio]             57.2 kg/m2                Doreen Deborah DIAS  
Work Phone:   
[phone]                          [facility]  
Work Phone:   
[phone]  
   
                                                    2024   
16:                              Body surface area   
Derived from formula      2.4 m2                    Doreen Deborah DIAS  
Work Phone:   
[phone]                          [facility]  
Work Phone:   
[phone]  
   
                                                    2024   
16:          Body temperature    97.6 [degF]         Doreen Deborah DIAS  
Work Phone:   
[phone]                          [facility]  
Work Phone:   
[phone]  
   
                                                    2024   
16:          Body weight         151.05 kg           Doreen Cabrera CNP  
Work Phone:   
[phone]                          [facility]  
Work Phone:   
[phone]  
   
                                                    2024   
16:                              Diastolic blood   
pressure                  86 mm[Hg]                 Doreen Mccormacken CNP  
Work Phone:   
[phone]                          [facility]  
Work Phone:   
[phone]  
   
                                                    2024   
16:          Heart rate          103 /min            Doreen Cabrera CNP  
Work Phone:   
[phone]                          [facility]  
Work Phone:   
[phone]  
   
                                                    2024   
16:          Respiratory rate    18 /min             Doreen Cabrera CNP  
Work Phone:   
[phone]                          [facility]  
Work Phone:   
[phone]  
   
                                                    2024   
16:                              SaO2% (BldA) [Mass   
fraction]                 95 %                      Doreen Cabrera CNP  
Work Phone:   
[phone]                          [facility]  
Work Phone:   
[phone]  
   
                                                    2024   
16:                              Systolic blood   
pressure                  135 mm[Hg]                Doreen Deborah CNP  
Work Phone:   
[phone]                          [facility]  
Work Phone:   
[phone]  
   
                                                    2024   
17:                              Diastolic blood   
pressure                  84 mm[Hg]                 Doreen Deborah CNP  
Work Phone:   
[phone]                          [facility]  
   
                                        Comment on above:   manual large   
   
                                                    2024   
17:                              Systolic blood   
pressure                  144 mm[Hg]                Doreen Deborah CNP  
Work Phone:   
[phone]                          [facility]  
   
                                        Comment on above:   manual large   
   
                                                    2024   
17:                              Diastolic blood   
pressure                  86 mm[Hg]                 Doreen Deborah CNP  
Work Phone:   
[phone]                          [facility]  
Work Phone:   
[phone]  
   
                                                    2024   
17:                              Systolic blood   
pressure                  154 mm[Hg]                Doreen Deborah CNP  
Work Phone:   
[phone]                          [facility]  
Work Phone:   
[phone]  
   
                                                    2024   
16:          Body height         162.56 cm           Doreen Cabrera CNP  
Work Phone:   
[phone]                          [facility]  
Work Phone:   
[phone]  
   
                                                    2024   
16:                              Body mass index   
(BMI) [Ratio]             57.9 kg/m2                Doreen Cabrera CNP  
Work Phone:   
[phone]                          [facility]  
Work Phone:   
[phone]  
   
                                                    2024   
16:                              Body surface area   
Derived from formula      2.4 m2                    Doreen Cabrera CNP  
Work Phone:   
[phone]                          [facility]  
Work Phone:   
[phone]  
   
                                                    2024   
16:          Body weight         153.14 kg           Doreen Cabrera CNP  
Work Phone:   
[phone]                          [facility]  
Work Phone:   
[phone]  
   
                                                    2024   
16:                              Diastolic blood   
pressure                  98 mm[Hg]                 Doreen Cabrera CNP  
Work Phone:   
[phone]                          [facility]  
Work Phone:   
[phone]  
   
                                                    2024   
16:          Heart rate          103 /min            Doreen Cabrera CNP  
Work Phone:   
[phone]                          [facility]  
Work Phone:   
[phone]  
   
                                                    2024   
16:                              SaO2% (BldA) [Mass   
fraction]                 96 %                      Doreen Cabrera CNP  
Work Phone:   
[phone]                          [facility]  
Work Phone:   
[phone]  
   
                                                    2024   
16:                              Systolic blood   
pressure                  151 mm[Hg]                Doreen Cabrera CNP  
Work Phone:   
[phone]                          [facility]  
Work Phone:   
[phone]  
   
                                                    2024   
16:                              Diastolic blood   
pressure                  89 mm[Hg]                 Doreen Cabrera CNP  
Work Phone:   
[phone]                          [facility]  
   
                                                    2024   
16:          Heart rate          75 /min             Doreen Cabrera CNP  
Work Phone:   
[phone]                          [facility]   
[facility]  
   
                                                    2024   
16:                              Systolic blood   
pressure                  137 mm[Hg]                Doreen Cabrera CNP  
Work Phone:   
[phone]                          [facility]   
[facility]  
   
                                                    2024   
16:          Body height         162.56 cm           Doreen Cabrera CNP  
Work Phone:   
[phone]                          [facility]  
   
                                                    2024   
16:                              Body mass index   
(BMI) [Ratio]             54.6 kg/m2                Doreen Cabrera CNP  
Work Phone:   
[phone]                          [facility]  
   
                                                    2024   
16:                              Body surface area   
Derived from formula      2.4 m2                    Doreen Cabrera CNP  
Work Phone:   
[phone]                          [facility]  
   
                                                    2024   
16:          Body weight         144.24 kg           Doreen Cabrera CNP  
Work Phone:   
[phone]                          [facility]  
   
                                                    2024   
16:                              Diastolic blood   
pressure                  94 mm[Hg]                 Doreen Cabrera CNP  
Work Phone:   
[phone]                          [facility]  
   
                                                    2024   
16:          Heart rate          75 /min             Doreen Cabrera CNP  
Work Phone:   
[phone]                          [facility]  
   
                                                    2024   
16:                              SaO2% (BldA) [Mass   
fraction]                 98 %                      Doreen Cabrera CNP  
Work Phone:   
[phone]                          [facility]  
   
                                                    2024   
16:                              Systolic blood   
pressure                  161 mm[Hg]                Doreen Cabrera CNP  
Work Phone:   
[phone]                          [facility]  
   
                                                    2024   
19:          Body height         162.56 cm           Doreen Cabrera CNP  
Work Phone:   
[phone]                          [facility]  
   
                                                    2024   
19:                              Body mass index   
(BMI) [Ratio]             52.7 kg/m2                Doreenpaola Mccormacken CNP  
Work Phone:   
[phone]                          [facility]  
   
                                                    2024   
19:                              Body surface area   
Derived from formula      2.3 m2                    Doreen Cabrera CNP  
Work Phone:   
[phone]                          [facility]  
   
                                                    2024   
19:          Body weight         139.26 kg           Doreen Cabrera CNP  
Work Phone:   
[phone]                          [facility]  
   
                                                    2024   
19:                              Diastolic blood   
pressure                  88 mm[Hg]                 Doreen Cabrera CNP  
Work Phone:   
[phone]                          [facility]  
   
                                                    2024   
19:          Heart rate          106 /min            Doreen Cabrera CNP  
Work Phone:   
[phone]                          [facility]  
   
                                                    2024   
19:                              SaO2% (BldA) [Mass   
fraction]                 97 %                      Doreen Cabrera CNP  
Work Phone:   
[phone]                          [facility]  
   
                                                    2024   
19:                              Systolic blood   
pressure                  134 mm[Hg]                Doreen Cabrera CNP  
Work Phone:   
[phone]                          [facility]  
   
                                                    2023   
15:                              Diastolic blood   
pressure                  69 mm[Hg]                 Doreen Cabrera CNP  
Work Phone:   
[phone]                          [facility]  
   
                                                    2023   
15:                              Systolic blood   
pressure                  116 mm[Hg]                Doreen Cabrera CNP  
Work Phone:   
[phone]                          [facility]  
   
                                                    2023   
15:                              Diastolic blood   
pressure                  78 mm[Hg]                 Doreen Cabrera CNP  
Work Phone:   
[phone]                          [facility]  
Work Phone:   
[phone]  
   
                                                    2023   
15:                              Systolic blood   
pressure                  137 mm[Hg]                Doreen Cabrera CNP  
Work Phone:   
[phone]                          [facility]  
Work Phone:   
[phone]  
   
                                                    2023   
17:          Body height         162.56 cm           Doreen Cabrera CNP  
Work Phone:   
[phone]                          [facility]  
Work Phone:   
[phone]  
   
                                                    07-   
17:                              Body mass index   
(BMI) [Ratio]             32.8 kg/m2                Doreen Cabrera CNP  
Work Phone:   
[phone]                          [facility]  
Work Phone:   
[phone]  
   
                                                    2023   
17:                              Body surface area   
Derived from formula      1.9 m2                    Doreen Cabrera CNP  
Work Phone:   
[phone]                          [facility]  
Work Phone:   
[phone]  
   
                                                    2023   
17:          Body temperature    98.2 [degF]         Doreen Cabrera CNP  
Work Phone:   
[phone]                          [facility]  
Work Phone:   
[phone]  
   
                                                    2023   
17:          Body weight         86.64 kg            Doreen Cabrera CNP  
Work Phone:   
[phone]                          [facility]  
Work Phone:   
[phone]  
   
                                                    2023   
17:                              Diastolic blood   
pressure                  76 mm[Hg]                 Doreen Cabrera CNP  
Work Phone:   
[phone]                          [facility]  
Work Phone:   
[phone]  
   
                                                    2023   
17:          Heart rate          97 /min             Doreen Cabrera CNP  
Work Phone:   
[phone]                          [facility]  
Work Phone:   
[phone]  
   
                                                    2023   
17:                              SaO2% (BldA) [Mass   
fraction]                 97 %                      Doreen Cabrera CNP  
Work Phone:   
[phone]                          [facility]  
Work Phone:   
[phone]  
   
                                                    2023   
17:                              Systolic blood   
pressure                  111 mm[Hg]                Doreen Cabrera CNP  
Work Phone:   
[phone]                          [facility]  
Work Phone:   
[phone]  
   
                                                    07-   
14:                              Diastolic blood   
pressure                  76 mm[Hg]                 Doreen Cabrera CNP  
Work Phone:   
[phone]                          [facility]  
   
                                                    07-   
14:          Heart rate          94 /min             Doreen Cabrera CNP  
Work Phone:   
[phone]                          [facility]  
   
                                                    07-   
14:                              Systolic blood   
pressure                  124 mm[Hg]                Doreen Cabrera CNP  
Work Phone:   
[phone]                          [facility]  
   
                                                    10-   
13:          Body height         162.56 cm           Doreen Cabrera CNP  
Work Phone:   
[phone]                          [facility]  
Work Phone:   
[phone]  
   
                                                    10-   
13:                              Body mass index   
(BMI) [Ratio]             52.5 kg/m2                Doreen Cabrera CNP  
Work Phone:   
[phone]                          [facility]  
Work Phone:   
[phone]  
   
                                                    10-   
13:                              Body surface area   
Derived from formula      2.3 m2                    Doreen Cabrera CNP  
Work Phone:   
[phone]                          [facility]  
Work Phone:   
[phone]  
   
                                                    10-   
13:          Body temperature    99.3 [degF]         Doreen Cabrera CNP  
Work Phone:   
[phone]                          [facility]  
Work Phone:   
[phone]  
   
                                                    10-   
13:          Body weight         138.8 kg            Doreen Cabrera CNP  
Work Phone:   
[phone]                          [facility]  
Work Phone:   
[phone]  
   
                                                    10-   
13:                              Diastolic blood   
pressure                  86 mm[Hg]                 Doreen Cabrera CNP  
Work Phone:   
[phone]                          [facility]  
Work Phone:   
[phone]  
   
                                                    10-   
13:          Heart rate          96 /min             Doreen Cabrera CNP  
Work Phone:   
[phone]                          [facility]  
Work Phone:   
[phone]  
   
                                                    10-   
13:          Heart Rate Rhythm   1 1                 Doreen Cabrera CNP  
Work Phone:   
[phone]                          [facility]  
Work Phone:   
[phone]  
   
                                                    10-   
13:                              SaO2% (BldA) [Mass   
fraction]                 97 %                      Doreen Cabrera CNP  
Work Phone:   
[phone]                          [facility]  
Work Phone:   
[phone]  
   
                                                    10-   
13:                              Systolic blood   
pressure                  124 mm[Hg]                Doreen Cabrera CNP  
Work Phone:   
[phone]                          [facility]  
Work Phone:   
[phone]  
   
                                                    2022   
15:          Body height         162.56 cm           Doreen Cabrera CNP  
Work Phone:   
[phone]                          [facility]  
Work Phone:   
[phone]  
   
                                                    2022   
15:                              Body mass index   
(BMI) [Ratio]             53.6 kg/m2                Doreen Cabrera CNP  
Work Phone:   
[phone]                          [facility]  
Work Phone:   
[phone]  
   
                                                    2022   
15:                              Body surface area   
Derived from formula      2.36 m2                   Doreen Cabrera CNP  
Work Phone:   
[phone]                          [facility]  
Work Phone:   
[phone]  
   
                                                    2022   
15:                              Body surface area   
Derived from formula      2.4 m2                    Doreen Cabrera CNP  
Work Phone:   
[phone]                          [facility]  
Work Phone:   
[phone]  
   
                                                    2022   
15:          Body temperature    97.4 [degF]         Doreen Cabrera CNP  
Work Phone:   
[phone]                          [facility]  
Work Phone:   
[phone]  
   
                                                    2022   
15:          Body weight         141.52 kg           Doreen Cabrera CNP  
Work Phone:   
[phone]                          [facility]  
Work Phone:   
[phone]  
   
                                                    2022   
15:                              Diastolic blood   
pressure                  82 mm[Hg]                 Doreen Cabrera CNP  
Work Phone:   
[phone]                          [facility]  
Work Phone:   
[phone]  
   
                                                    2022   
15:          Heart rate          86 /min             Doreen Cabrera CNP  
Work Phone:   
[phone]                          [facility]  
Work Phone:   
[phone]  
   
                                                    2022   
15:                              SaO2% (BldA) [Mass   
fraction]                 98 %                      Doreen Cabrera CNP  
Work Phone:   
[phone]                          [facility]  
Work Phone:   
[phone]  
   
                                                    2022   
15:                              Systolic blood   
pressure                  124 mm[Hg]                Doreen Cabrera CNP  
Work Phone:   
[phone]                          [facility]  
Work Phone:   
[phone]  
   
                                                    2022   
15:          Body height         162.56 cm           Doreen Cabrera CNP  
Work Phone:   
[phone]                          [facility]  
Work Phone:   
[phone]  
   
                                                    2022   
15:                              Body mass index   
(BMI) [Ratio]             52.2 kg/m2                Doreen Cabrera CNP  
Work Phone:   
[phone]                          [facility]  
Work Phone:   
[phone]  
   
                                                    2022   
15:                              Body surface area   
Derived from formula      2.34 m2                   Doreen Cabrera CNP  
Work Phone:   
[phone]                          [facility]  
Work Phone:   
[phone]  
   
                                                    2022   
15:                              Body surface area   
Derived from formula      2.3 m2                    Doreen Cabrera CNP  
Work Phone:   
[phone]                          [facility]  
Work Phone:   
[phone]  
   
                                                    2022   
15:          Body temperature    99.3 [degF]         Doreen Cabrera CNP  
Work Phone:   
[phone]                          [facility]  
Work Phone:   
[phone]  
   
                                                    2022   
15:          Body weight         137.89 kg           Doreen Cabrera CNP  
Work Phone:   
[phone]                          [facility]  
Work Phone:   
[phone]  
   
                                                    2022   
15:                              Diastolic blood   
pressure                  94 mm[Hg]                 Doreen Cabrera CNP  
Work Phone:   
[phone]                          [facility]  
Work Phone:   
[phone]  
   
                                                    2022   
15:          Heart rate          105 /min            Doreen Cabrera CNP  
Work Phone:   
[phone]                          [facility]  
Work Phone:   
[phone]  
   
                                                    2022   
15:          Heart Rate Rhythm   1 1                 Doreen Cabrera CNP  
Work Phone:   
[phone]                          [facility]  
Work Phone:   
[phone]  
   
                                                    2022   
15:                              SaO2% (BldA) [Mass   
fraction]                 98 %                      Doreen Cabrera CNP  
Work Phone:   
[phone]                          [facility]  
Work Phone:   
[phone]  
   
                                                    2022   
15:                              Systolic blood   
pressure                  126 mm[Hg]                Doreen Cabrera CNP  
Work Phone:   
[phone]                          [facility]  
Work Phone:   
[phone]  
   
                                                    2022   
16:                              Diastolic blood   
pressure                  102 mm[Hg]                Doreen Cabrera CNP  
Work Phone:   
[phone]                          [facility]  
Work Phone:   
[phone]  
   
                                                    2022   
16:                              Systolic blood   
pressure                  150 mm[Hg]                Doreen Cabrera CNP  
Work Phone:   
[phone]                          [facility]  
Work Phone:   
[phone]  
   
                                                    2022   
15:          Body height         162.56 cm           Doreen Cabrera CNP  
Work Phone:   
[phone]                          [facility]  
Work Phone:   
[phone]  
   
                                                    2022   
15:                              Body mass index   
(BMI) [Ratio]             52.6 kg/m2                Doreen Cabrera CNP  
Work Phone:   
[phone]                          [facility]  
Work Phone:   
[phone]  
   
                                                    2022   
15:                              Body surface area   
Derived from formula      2.34 m2                   Doreen Cabrera CNP  
Work Phone:   
[phone]                          [facility]  
Work Phone:   
[phone]  
   
                                                    2022   
15:                              Body surface area   
Derived from formula      2.3 m2                    Doreen Cabrera CNP  
Work Phone:   
[phone]                          [facility]  
Work Phone:   
[phone]  
   
                                                    2022   
15:          Body temperature    98.5 [degF]         Doreen Cabrera CNP  
Work Phone:   
[phone]                          [facility]  
Work Phone:   
[phone]  
   
                                                    2022   
15:          Body weight         138.98 kg           Doreen Cabrera CNP  
Work Phone:   
[phone]                          [facility]  
Work Phone:   
[phone]  
   
                                                    2022   
15:                              Diastolic blood   
pressure                  108 mm[Hg]                Doreen Cabrera CNP  
Work Phone:   
[phone]                          [facility]  
Work Phone:   
[phone]  
   
                                                    2022   
15:          Heart rate          89 /min             Doreen Cabrera CNP  
Work Phone:   
[phone]                          [facility]  
Work Phone:   
[phone]  
   
                                                    2022   
15:                              SaO2% (BldA) [Mass   
fraction]                 99 %                      Doreen Cabrera CNP  
Work Phone:   
[phone]                          [facility]  
Work Phone:   
[phone]  
   
                                                    2022   
15:                              Systolic blood   
pressure                  152 mm[Hg]                Doreen Cabrera CNP  
Work Phone:   
[phone]                          [facility]  
Work Phone:   
[phone]  
   
                                                    2021   
13:          Body height         162.56 cm           Doreen Cabrera CNP  
Work Phone:   
[phone]                          [facility]  
Work Phone:   
[phone]  
   
                                                    2021   
13:                              Body mass index   
(BMI) [Ratio]             50.1 kg/m2                Doreen Cabrera CNP  
Work Phone:   
[phone]                          [facility]  
Work Phone:   
[phone]  
   
                                                    2021   
13:                              Body surface area   
Derived from formula      2.3 m2                    Doreen Cabrera CNP  
Work Phone:   
[phone]                          [facility]  
Work Phone:   
[phone]  
   
                                                    2021   
13:          Body temperature    96.8 [degF]         Doreen Cabrera CNP  
Work Phone:   
[phone]                          [facility]  
Work Phone:   
[phone]  
   
                                                    2021   
13:          Body weight         132.45 kg           Doreen Cabrera CNP  
Work Phone:   
[phone]                          [facility]  
Work Phone:   
[phone]  
   
                                                    2021   
13:                              Diastolic blood   
pressure                  86 mm[Hg]                 Doreen Cabrera CNP  
Work Phone:   
[phone]                          [facility]  
Work Phone:   
[phone]  
   
                                                    2021   
13:          Heart rate          86 /min             Doreen Cabrera CNP  
Work Phone:   
[phone]                          [facility]  
Work Phone:   
[phone]  
   
                                                    2021   
13:          Respiratory rate    18 /min             Doreen Cabrera CNP  
Work Phone:   
[phone]                          [facility]  
Work Phone:   
[phone]  
   
                                                    2021   
13:                              SaO2% (BldA) [Mass   
fraction]                 96 %                      Doreen Cabrera CNP  
Work Phone:   
[phone]                          [facility]  
Work Phone:   
[phone]  
   
                                                    2021   
13:                              Systolic blood   
pressure                  132 mm[Hg]                Doreen Cabrera CNP  
Work Phone:   
[phone]                          [facility]   
[facility]  
Work Phone:   
[phone]  
   
                                                    2021   
23:          Heart rate          100 /min            Seth Rahman MD  
Work Phone:   
[phone]                          [facility]  
Work Phone:   
[phone]  
   
                                                    2021   
23:                              Diastolic blood   
pressure                  94 mm[Hg]                 Seth Rahman MD  
Work Phone:   
[phone]                          [facility]  
Work Phone:   
[phone]  
   
                                                    2021   
23:                              Systolic blood   
pressure                  146 mm[Hg]                Seth Rahman MD  
Work Phone:   
[phone]                          [facility]  
Work Phone:   
[phone]  
   
                                                    2021   
22:                              SaO2% (BldA) [Mass   
fraction]                 97 %                      Seth Rahman MD  
Work Phone:   
[phone]                          [facility]  
Work Phone:   
[phone]  
   
                                                    2021   
14:                              Body mass index   
(BMI) [Ratio]             47.72 kg/m2               Seth Rahman MD  
Work Phone:   
[phone]                          [facility]  
Work Phone:   
[phone]  
   
                                                    2021   
14:          Body temperature    98.2 [degF]         Seth Rahman MD  
Work Phone:   
[phone]                          [facility]  
Work Phone:   
[phone]  
   
                                                    2021   
14:          Body weight         126.1 kg            Seth Rahman MD  
Work Phone:   
[phone]                          [facility]  
Work Phone:   
[phone]  
   
                                                    2021   
14:          Respiratory rate    16 /min             Seth Rahman MD  
Work Phone:   
[phone]                          [facility]  
Work Phone:   
[phone]  
   
                                                    2021   
16:          Body height         162.56 cm           Doreen Cabrera CNP  
Work Phone:   
[phone]                          [facility]  
Work Phone:   
[phone]  
   
                                                    2021   
16:                              Body mass index   
(BMI) [Ratio]             49.3 kg/m2                Doreen Cabrera CNP  
Work Phone:   
[phone]                          [facility]  
Work Phone:   
[phone]  
   
                                                    2021   
16:                              Body surface area   
Derived from formula      2.28 m2                   Doreen Cabrera CNP  
Work Phone:   
[phone]                          [facility]  
Work Phone:   
[phone]  
   
                                                    2021   
16:          Body temperature    97.4 [degF]         Doreen Cabrera CNP  
Work Phone:   
[phone]                          [facility]  
Work Phone:   
[phone]  
   
                                                    2021   
16:          Body weight         130.18 kg           Doreen Cabrera CNP  
Work Phone:   
[phone]                          [facility]  
Work Phone:   
[phone]  
   
                                                    2021   
16:                              Diastolic blood   
pressure                  98 mm[Hg]                 Doreen Cabrera CNP  
Work Phone:   
[phone]                          [facility]  
Work Phone:   
[phone]  
   
                                                    2021   
16:          Heart rate          85 /min             Doreen Cabrera CNP  
Work Phone:   
[phone]                          [facility]  
Work Phone:   
[phone]  
   
                                                    2021   
16:          Respiratory rate    18 /min             Doreen Cabrera CNP  
Work Phone:   
[phone]                          [facility]  
Work Phone:   
[phone]  
   
                                                    2021   
16:                              SaO2% (BldA) [Mass   
fraction]                 97 %                      Doreen Cabrera CNP  
Work Phone:   
[phone]                          [facility]  
Work Phone:   
[phone]  
   
                                                    2021   
16:                              Systolic blood   
pressure                  144 mm[Hg]                Doreen Mccormackobed DIAS  
Work Phone:   
[phone]                          [facility]   
[facility]  
Work Phone:   
[phone]  
   
                                                    2021   
02:                              Diastolic blood   
pressure                  93 mm[Hg]                 Malika Shepherd MD  
Work Phone:   
[phone]                          [facility]  
Work Phone:   
[phone]  
   
                                                    2021   
02:          Heart rate          75 /min             Malika Shepherd MD  
Work Phone:   
[phone]                          [facility]  
Work Phone:   
[phone]  
   
                                                    2021   
02:          Respiratory rate    20 /min             Malika Shepherd MD  
Work Phone:   
[phone]                          [facility]  
Work Phone:   
[phone]  
   
                                                    2021   
02:                              SaO2% (BldA) [Mass   
fraction]                 100 %                     Malika Shepherd MD  
Work Phone:   
[phone]                          [facility]  
Work Phone:   
[phone]  
   
                                                    2021   
02:                              Systolic blood   
pressure                  150 mm[Hg]                Malika Shepherd MD  
Work Phone:   
[phone]                          [facility]  
Work Phone:   
[phone]  
   
                                                    2021   
20:          Body temperature    99.1 [degF]         Malika Shepherd MD  
Work Phone:   
[phone]                          [facility]  
Work Phone:   
[phone]  
   
                                                    2021   
11:          Body height         162.56 cm           Doreenpaola Cabrera CNP  
Work Phone:   
[phone]                          [facility]  
Work Phone:   
[phone]  
   
                                                    2021   
11:                              Body mass index   
(BMI) [Ratio]             48.9 kg/m2                Doreenpaola Cabrera CNP  
Work Phone:   
[phone]                          [facility]  
Work Phone:   
[phone]  
   
                                                    2021   
11:                              Body surface area   
Derived from formula      2.27 m2                   Doreen Cabrera CNP  
Work Phone:   
[phone]                          [facility]  
Work Phone:   
[phone]  
   
                                                    2021   
11:          Body temperature    98.2 [degF]         Doreen Cabrera CNP  
Work Phone:   
[phone]                          [facility]  
Work Phone:   
[phone]  
   
                                                    2021   
11:          Body weight         129.28 kg           Doreen Cabrera CNP  
Work Phone:   
[phone]                          [facility]  
Work Phone:   
[phone]  
   
                                                    2021   
11:                              Diastolic blood   
pressure                  86 mm[Hg]                 Doreen Cabrera CNP  
Work Phone:   
[phone]                          [facility]  
Work Phone:   
[phone]  
   
                                                    2021   
11:          Heart rate          101 /min            Doreen Cabrera CNP  
Work Phone:   
[phone]                          [facility]  
Work Phone:   
[phone]  
   
                                                    2021   
11:                              SaO2% (BldA) [Mass   
fraction]                 98 %                      Doreen Cabrera CNP  
Work Phone:   
[phone]                          [facility]  
Work Phone:   
[phone]  
   
                                                    2021   
11:                              Systolic blood   
pressure                  124 mm[Hg]                Doreen Cabrera CNP  
Work Phone:   
[phone]                          [facility]  
Work Phone:   
[phone]  
   
                                                    2021   
16:          Body height         162.6 cm            [employer]  
Work Phone:   
[phone]                          [facility]  
Work Phone:   
[phone]  
   
                                                    2021   
16:                              Body mass index   
(BMI) [Ratio]             47.2 kg/m2                [employer]  
Work Phone:   
[phone]                          [facility]  
Work Phone:   
[phone]  
   
                                                    2021   
16:          Body temperature    98.8 [degF]         Rafael Andes DO  
Work Phone:   
[phone]                          [facility]  
Work Phone:   
[phone]  
   
                                                    2021   
16:          Body weight         124.74 kg           Rafael Andes DO  
Work Phone:   
[phone]                          [facility]  
Work Phone:   
[phone]  
   
                                                    2021   
16:                              Diastolic blood   
pressure                  88 mm[Hg]                 Rafael Andes DO  
Work Phone:   
[phone]                          [facility]  
Work Phone:   
[phone]  
   
                                                    2021   
16:          Heart rate          78 /min             Rafael Andes DO  
Work Phone:   
[phone]                          [facility]  
Work Phone:   
[phone]  
   
                                                    2021   
16:          Respiratory rate    18 /min             Rafael Andes DO  
Work Phone:   
[phone]                          [facility]  
Work Phone:   
[phone]  
   
                                                    2021   
16:                              SaO2% (BldA) [Mass   
fraction]                 96 %                      Rafael Andes DO  
Work Phone:   
[phone]                          [facility]  
Work Phone:   
[phone]  
   
                                                    2021   
16:                              Systolic blood   
pressure                  138 mm[Hg]                Rafael Andes DO  
Work Phone:   
[phone]                          [facility]  
Work Phone:   
[phone]  
   
                                                    06-   
15:          Body height         162.56 cm           Doreen Cabrera [employer]  
Work Phone:   
[phone]                          [employer]   
[facility]  
Work Phone:   
[phone]  
   
                                                    06-   
15:                              Body mass index   
(BMI) [Ratio]             49.5 kg/m2                Doreen Cabrera [employer]  
Work Phone:   
[phone]                          [facility]  
Work Phone:   
[phone]  
   
                                                    06-   
15:                              Body surface area   
Derived from formula      2.29 m2                   Doreen Cabrera CNP  
Work Phone:   
[phone]                          [facility]  
Work Phone:   
[phone]  
   
                                                    06-   
15:          Body temperature    97.6 [degF]         Doreen Cabrera CNP  
Work Phone:   
[phone]                          [facility]  
Work Phone:   
[phone]  
   
                                                    06-   
15:          Body weight         130.82 kg           Doreen Cabrera CNP  
Work Phone:   
[phone]                          [facility]  
Work Phone:   
[phone]  
   
                                                    06-   
15:                              Diastolic blood   
pressure                  88 mm[Hg]                 Doreen Cabrera CNP  
Work Phone:   
[phone]                          [facility]  
Work Phone:   
[phone]  
   
                                                    06-   
15:          Heart rate          83 /min             Doreen Cabrera CNP  
Work Phone:   
[phone]                          [facility]  
Work Phone:   
[phone]  
   
                                                    06-   
15:          Respiratory rate    20 /min             Doreen Cabrera CNP  
Work Phone:   
[phone]                          [facility]  
Work Phone:   
[phone]  
   
                                                    06-   
15:                              SaO2% (BldA) [Mass   
fraction]                 98 %                      Doreen Cabrera CNP  
Work Phone:   
[phone]                          [facility]  
Work Phone:   
[phone]  
   
                                                    06-   
15:                              Systolic blood   
pressure                  130 mm[Hg]                Doreen Cabrera CNP  
Work Phone:   
[phone]                          [facility]  
Work Phone:   
[phone]  
   
                                                    2021   
15:                              Diastolic blood   
pressure                  108 mm[Hg]                Doreen Cabrera CNP  
Work Phone:   
[phone]                          [facility]  
Work Phone:   
[phone]  
   
                                                    2021   
15:                              Systolic blood   
pressure                  134 mm[Hg]                Doreen Cabrera CNP  
Work Phone:   
[phone]                          [facility]  
Work Phone:   
[phone]  
   
                                                    2021   
14:          Body height         162.56 cm           Doreen Cabrera CNP  
Work Phone:   
[phone]                          [facility]  
Work Phone:   
[phone]  
   
                                                    2021   
14:                              Body mass index   
(BMI) [Ratio]             49.8 kg/m2                Doreen Cabrera CNP  
Work Phone:   
[phone]                          [facility]  
Work Phone:   
[phone]  
   
                                                    2021   
14:                              Body surface area   
Derived from formula      2.29 m2                   Doreen Cabrera CNP  
Work Phone:   
[phone]                          [facility]  
Work Phone:   
[phone]  
   
                                                    2021   
14:          Body temperature    96.4 [degF]         Doreen Cabrera CNP  
Work Phone:   
[phone]                          [facility]  
Work Phone:   
[phone]  
   
                                                    2021   
14:          Body weight         131.54 kg           Doreen Cabrera CNP  
Work Phone:   
[phone]                          [facility]  
Work Phone:   
[phone]  
   
                                                    2021   
14:                              Diastolic blood   
pressure                  108 mm[Hg]                Doreen Cabrera CNP  
Work Phone:   
[phone]                          [facility]  
Work Phone:   
[phone]  
   
                                                    2021   
14:          Heart rate          97 /min             Doreen Cabrera CNP  
Work Phone:   
[phone]                          [facility]  
Work Phone:   
[phone]  
   
                                                    2021   
14:          Respiratory rate    18 /min             Doreen Cabrera CNP  
Work Phone:   
[phone]                          [facility]  
Work Phone:   
[phone]  
   
                                                    2021   
14:                              SaO2% (BldA) [Mass   
fraction]                 98 %                      Doreen Cabrera CNP  
Work Phone:   
[phone]                          [facility]  
Work Phone:   
[phone]  
   
                                                    2021   
14:                              Systolic blood   
pressure                  150 mm[Hg]                Doreen Cabrera CNP  
Work Phone:   
[phone]                          [facility]  
Work Phone:   
[phone]  
  
  
  
Encounters  
  
  
                          Encounter Date Encounter Type Care Provider Facility  
   
                                                    Start: 2024  
End: 2024           [facility] visit, estab pt      Radha Young LSW  
Work Phone:   
[phone]                          [facility]  
Work Phone:   
[phone]  
   
                                                    Start: 2024  
End: 2024           [facility] visit, estab pt      Leonie Short LISWS  
Work Phone:   
[phone]                          [facility]  
Work Phone:   
[phone]  
   
                                                    Start: 2024  
End: 2024                         Encounter for   
preprocedural laboratory   
examination                             Doreen Cabrera CNP  
Work Phone:   
[phone]                          [facility]  
Work Phone:   
[phone]  
   
                                                    Start: 2024  
End: 2024           [facility] visit, estab pt      Doreen Cabrera CNP  
Work Phone:   
[phone]                          [facility]  
Work Phone:   
[phone]  
   
                                                    Start: 2024  
End: 2024           [facility] visit, estab pt      Leonie Short LISWS  
Work Phone:   
[phone]                          [facility]  
Work Phone:   
[phone]  
   
                                                    Start: 2024  
End: 2024           [facility] visit, estab pt      Leonie Short LISWS  
Work Phone:   
[phone]                          [facility]  
Work Phone:   
[phone]  
   
                                                    Start: 2024  
End: 2024                         Emergency department   
patient visit             ABRAHAM MARIE          [facility]  
   
                                                    Start: 2024  
End: 2024           [facility] visit, estab pt      Leonie HUTCHINSON  
Work Phone:   
[phone]                          [facility]  
Work Phone:   
[phone]  
   
                                                    Start: 2024  
End: 2024           General                   Doreen Cabrera CNP  
Work Phone:   
[phone]                          [facility]  
Work Phone:   
[phone]  
   
                                                    Start: 2023  
End: 2023           General                   Brandy Beaulieu DDS  
Work Phone:   
[phone]                          [facility]  
Work Phone:   
[phone]  
   
                                                    Start: 2023  
End: 2023           General                   Brandy Beaulieu DDS  
Work Phone:   
[phone]                          [facility]  
Work Phone:   
[phone]  
   
                                                    Start: 2023  
End: 2023           [facility] visit, estab pt      Doreen Cabrera CNP  
Work Phone:   
[phone]                          [facility]  
Work Phone:   
[phone]  
   
                                        Start: 07-   comprehensive oral   
evaluation - new or   
established patient                     Brandy Beaulieu DDS  
Work Phone:   
[phone]                          [facility]  
   
                                                    Start: 07-  
End: 07-           General                   [address] Ronquillo [address]  
Work Phone:   
[phone]                          [facility]  
Work Phone:   
[phone]  
   
                                                    Start: 10-  
End: 10-     ambulatory          DOREEN CABRERA      [facility]  
   
                                                    Start: 10-  
End: 10-                         Subsequent hospital visit   
by physician                            Doreen MCCORMACK -   
CNP  
Work Phone:   
[phone]                          [facility]   
CTR  
   
                                                    Start: 10-  
End: 10-           FQHC visit, estab pt      Doreen Cabrera CNP  
Work Phone:   
[phone]                          [facility]  
Work Phone:   
[phone]  
   
                                                    Start: 2022  
End: 2022           General                   Haylie Aguilar   
[facility]  
Work Phone:   
[phone]                          [facility]   
[facility]  
Work Phone:   
[phone]  
   
                                                    Start: 2022  
End: 2022           ambulatory                Julieth Pisano CNP  
Work Phone:   
[phone]                          [facility]  
Work Phone:   
[phone]  
   
                                                    Start: 2022  
End: 2022           General                   Julieth Ailya CNP  
Work Phone:   
[phone]                          [facility]  
Work Phone:   
[phone]  
   
                                                    Start: 2022  
End: 2022           FQHC visit, estab pt      Haylie Aguilar   
[facility]  
Work Phone:   
[phone]                          [facility]  
Work Phone:   
[phone]  
   
                                                    Start: 2022  
End: 2022           FQHC visit, estab pt      Haylie Aguilar   
[facility]  
Work Phone:   
[phone]                          [facility]  
Work Phone:   
[phone]  
   
                                                    Start: 2022  
End: 2022           ambulatory                Julieth Menaer CNP  
Work Phone:   
[phone]                          [facility]  
Work Phone:   
[phone]  
   
                                                    Start: 2022  
End: 2021           General                   Julieth Aliya CNP  
Work Phone:   
[phone]                          [facility]  
Work Phone:   
[phone]  
   
                                                    Start: 10-  
End: 10-     ambulatory          DOREEN CABRERA      [facility]  
l  
   
                                                    Start: 10-  
End: 10-                         Subsequent hospital visit   
by physician              [facility] EKG  
   
                                        Comment on above:   Tachycardia   
   
                                                    Start: 2021  
End: 2021           FQHC visit, estab pt      Avis Felder   
[facility]  
Work Phone:   
[phone]                          [facility]  
Work Phone:   
[phone]  
   
                                                    Start: 2021  
End: 2021           FQHC visit, estab pt      Avis Felder   
[facility]  
Work Phone:   
[phone]                          [facility]  
Work Phone:   
[phone]  
   
                                                    Start: 09-  
End: 2021                         Evaluation and management   
of inpatient              [facility]  
   
                                                    Start: 2021  
End: 09-                         Emergency department   
patient visit             ISMAEL ORTEGA          [facility]  
   
                                                    Start: 2021  
End: 2021                         Emergency department   
patient visit                           Seth Rahman MD  
Work Phone:   
[phone]                          [facility]   
ED  
   
                                        Comment on above:   Bipolar 1 disorder (  
HCC) (Primary Dx);  
Homicidal ideation   
   
                                                    Start: 2021  
End: 2021           FQHC visit, estab pt      Leonie HUTCHINSON  
Work Phone:   
[phone]                          [facility]  
Work Phone:   
[phone]  
   
                                                    Start: 2021  
End: 2021                         Emergency department   
patient visit             MALIKA MANN [facility]  
   
                                                    Start: 2021  
End: 2021                         Emergency department   
patient visit                           Malika Shepherd MD  
Work Phone:   
[phone]                          [facility]   
ED  
   
                                        Comment on above:   Anxiety state (Prima  
ry Dx)   
   
                                                    Start: 2021  
End: 2021           ambulatory                Pamela Maguire CNP  
Work Phone:   
[phone]                          [facility]  
Work Phone:   
[phone]  
   
                                                    Start: 2021  
End: 2021           General                   Pamela Maguire CNP  
Work Phone:   
[phone]                          [facility]  
Work Phone:   
[phone]  
   
                                                    Start: 2021  
End: 2021           FQHC visit, estab pt      Leonie HUTCHINSON  
Work Phone:   
[phone]                          [facility]  
Work Phone:   
[phone]  
   
                                                    Start: 2021  
End: 2021           FQHC visit, estab pt      Leonie Short LISWS  
Work Phone:   
[phone]                          [facility]  
Work Phone:   
[phone]  
   
                                                    Start: 2021  
End: 2021                         Emergency department   
patient visit             RAFAEL GALLAGHER              [facility]  
   
                                                    Start: 2021  
End: 2021                         Emergency department   
patient visit                           Rafael Gallagher DO  
Work Phone:   
[phone]                          [facility]   
ED  
   
                                        Comment on above:   Depression with suic  
idal ideation (Primary Dx)   
   
                                                    Start: 06-  
End: 06-           FQHC visit, estab pt      Leonie Short LISWS  
Work Phone:   
[phone]                          [facility]  
Work Phone:   
[phone]  
   
                                                    Start: 06-  
End: 06-           FQHC visit, estab pt      Leonie Short LISWS  
Work Phone:   
[phone]                          [facility]  
Work Phone:   
[phone]  
   
                                                    Start: 2021  
End: 2021           FQHC visit, estab pt      Leonie Short LISWS  
Work Phone:   
[phone]                          [facility]  
Work Phone:   
[phone]  
   
                                                    Start: 2021  
End: 2021           FQHC visit new patient    Doreen Cabrera CNP  
Work Phone:   
[phone]                          [facility]  
Work Phone:   
[phone]  
   
                                                    Start: 08-  
End: 08-                         Patient encounter   
procedure                 ANYA GOLDBERG MARSHALL           Facility:  
   
                                                    Start: 2017  
End: 2017                         Emergency department   
patient visit             MD HALLE ANGEL     Facility:[facility]  
  
  
  
Procedures  
  
  
                          Date         Procedure    Procedure Detail Performing   
Clinician  
   
                                        Start: 2024   Application of silve  
r   
diamine fluoride by   
physician                                           Doreen Cabrera CNP  
Work Phone:   
[phone]  
   
                                        Start: 2024   Behav assmt w/score   
&   
docd/stand instrument                               Radha Young LSW  
Work Phone:   
[phone]  
   
                                        Start: 2024   Eye img vld mtch dx   
7   
stnd fld w/o evc rtnopthy                           Doreen Cabrera CNP  
Work Phone:   
[phone]  
   
                                        Start: 2024   Foot examination   
performed                                           Doreen Cabrera CNP  
Work Phone:   
[phone]  
   
                                        Start: 2024   Fundus photography   
w/interpretation & report                           Doreen Cabrera CNP  
Work Phone:   
[phone]  
   
                                        Start: 2024   Gluc bld gluc mntr d  
ev   
cleared fda spec home use                           Doreen Cabrera CNP  
Work Phone:   
[phone]  
   
                                        Start: 2024   Most recent diastoli  
c   
blood pressure 80-89 mm   
hg                                                  Doreen Cabrera CNP  
Work Phone:   
[phone]  
   
                                        Start: 2024   Most recent systolic  
   
blood press 130-139mm hg                            Doreen Cabrera CNP  
Work Phone:   
[phone]  
   
                                        Start: 2024   Collection venous bl  
ood   
venipuncture                                        Doreen Cabrera CNP  
Work Phone:   
[phone]  
   
                                        Start: 2024   Current tobacco non-  
user   
cad cap copd pv dm                                  Doreen Cabrera CNP  
Work Phone:   
[phone]  
   
                                        Start: 2024   Gluc bld gluc mntr d  
ev   
cleared fda spec home use                           Doreen Cabrera CNP  
Work Phone:   
[phone]  
   
                                        Start: 2024   Most recent hg a1c>e  
qual   
to 7.0%&<8.0%                                       Doreen Cabrera CNP  
Work Phone:   
[phone]  
   
                          Start: 2024 Pneumococcal Prevnar 20               
 Doreen Cabrera CNP  
Work Phone:   
[phone]  
   
                                        Start: 2024   Psychotherapy w/tabatha  
ent   
30 minutes                                          Leonie Short LISTaggs  
Work Phone:   
[phone]  
   
                                        Start: 2024   Behav assmt w/score   
&   
docd/stand instrument                               Leonie Short LISWS  
Work Phone:   
[phone]  
   
                                        Start: 2024   Current tobacco non-  
user   
cad cap copd pv dm                                  Doreen Cabrera CNP  
Work Phone:   
[phone]  
   
                                        Start: 2024   Most recent diastoli  
c   
blood pressure 80-89 mm   
hg                                                  Doreen Cabrera CNP  
Work Phone:   
[phone]  
   
                                        Start: 2024   Most recent systolic  
   
blood press 130-139mm hg                            Doreen Cabrera CNP  
Work Phone:   
[phone]  
   
                                        Start: 2024   Pt scrnd tobacco use  
 rcvd   
tobacco cessation talk                              Doreen Cabrera CNP  
Work Phone:   
[phone]  
   
                                        Start: 2024   Current tobacco non-  
user   
cad cap copd pv dm                                  Doreen Cabrera CNP  
Work Phone:   
[phone]  
   
                                        Start: 2024   Gluc bld gluc mntr d  
ev   
cleared fda spec home use                           Doreen Cabrera CNP  
Work Phone:   
[phone]  
   
                                        Start: 2024   Most recent diastoli  
c   
blood pressure 80-89 mm   
hg                                                  Doreen Cabrera CNP  
Work Phone:   
[phone]  
   
                                        Start: 2024   Most recent hemoglob  
in   
a1c level < 7.0%                                    Doreen Cabrera CNP  
Work Phone:   
[phone]  
   
                                        Start: 2024   Most recent systolic  
   
blood press 130-139mm hg                            Doreen Cabrera CNP  
Work Phone:   
[phone]  
   
                                        Start: 2024   Psychotherapy w/tabatha  
ent   
30 minutes                                          Leonie HUTCHINSON  
Work Phone:   
[phone]  
   
                                        Start: 2024   Pt scrnd tobacco use  
 rcvd   
tobacco cessation talk                              Doreen Cabrera CNP  
Work Phone:   
[phone]  
   
                                Start: 2024 Screening for disorder Visit F  
or: Screening   
For Disorder                            Leonie HUTCHINSON  
Work Phone:   
[phone]  
   
                                        Start: 2024   Venereal disease   
screening                               Visit For: Screening   
Exam Std                                Doreen Cabrera CNP  
Work Phone:   
[phone]  
   
                                        Start: 2023   resin-based composit  
e -   
one surface, posterior                              Brandy Beaulieu DDS  
Work Phone:   
[phone]  
   
                                        Start: 2023   resin-based composit  
e -   
one surface, posterior                              Brandy Beaulieu DDS  
Work Phone:   
[phone]  
   
                                        Start: 2023   Hemoglobin glycosyla  
geraldine   
a1c                                                 Doreen Deborah CNP  
Work Phone:   
[phone]  
   
                                        Start: 2023   Most recent diastoli  
c   
blood pressure < 80 mm hg                           Doreen Cabrera CNP  
Work Phone:   
[phone]  
   
                                        Start: 2023   Most recent hemoglob  
in   
a1c level < 7.0%                                    Doreen Cabrera CNP  
Work Phone:   
[phone]  
   
                                        Start: 2023   Most recent systolic  
   
blood pressure <130 mm hg                           Doreen Cabrera CNP  
Work Phone:   
[phone]  
   
                                        Start: 07-   caries risk assessme  
nt   
and documentation, with a   
finding of high risk                                Yue Ronquillo RD  
Work Phone:   
[phone]  
   
                                        Start: 07-   intraoral - complete  
   
series of radiographic   
images                                              Yue Ronquillo RD  
Work Phone:   
[phone]  
   
                                        Start: 07-   panoramic radiograph  
ic   
image                                               Yue Ronquillo RD  
Work Phone:   
[phone]  
   
                          Start: 07- prophylaxis - adult              Tommy Ronquillo RD  
Work Phone:   
[phone]  
   
                                        Start: 10-   Most recent diastoli  
c   
blood pressure 80-89 mm   
hg                                                  Doreen Cabrera CNP  
Work Phone:   
[phone]  
   
                                        Start: 10-   Most recent systolic  
   
blood pressure <130 mm hg                           Doreen Cabrera CNP  
Work Phone:   
[phone]  
   
                                        Start: 2022   Psychotherapy w/tabatha  
ent   
30 minutes                                          Haylie Aguilar [facility]S  
Work Phone:   
[phone]  
   
                                        Start: 2022   Most recent diastoli  
c   
blood pressure 80-89 mm   
hg                                                  Julieth Aliya CNP  
Work Phone:   
[phone]  
   
                                        Start: 2022   Most recent systolic  
   
blood pressure <130 mm hg                           Julieth Aliya CNP  
Work Phone:   
[phone]  
   
                                        Start: 2022   Psychotherapy w/tabatha  
ent   
30 minutes                                          Haylie Aguilar [facility]  
Work Phone:   
[phone]  
   
                                        Start: 2022   Most recent diastol   
blood   
pres >/equal 90 mm hg                               Doreen Cabrera CNP  
Work Phone:   
[phone]  
   
                                        Start: 2022   Most recent systolic  
   
blood pressure <130 mm hg                           Doreen Cabrera [facility]  
Work Phone:   
[phone]  
   
                                        Start: 2022   Psychotherapy w/tabatha  
ent   
30 minutes                                          Haylie Aguilar [facility]  
Work Phone:   
[phone]  
   
                                        Start: 2022   Hemoglobin glycosyla  
geraldine   
a1c                                                 Julieth Pisano [facility]  
Work Phone:   
[phone]  
   
                                        Start: 2022   Most recent diastol   
blood   
pres >/equal 90 mm hg                               Julieth Menaer [facility]  
Work Phone:   
[phone]  
   
                                        Start: 2022   Most recent hemoglob  
in   
a1c level < 7.0%                                    Julieth Menaer [facility]  
Work Phone:   
[phone]  
   
                                        Start: 2022   Most recent systolic  
   
blood pres>/equal 140 mm   
hg                                                  Julieth Pisano [facility]  
Work Phone:   
[phone]  
   
                                        Start: 2022   Psychotherapy w/tabatha  
ent   
30 minutes                                          Haylie Aguilar [facility]  
Work Phone:   
[phone]  
   
                                        Start: 2021   Antibody hiv-1&hiv-2  
   
single result                                       Doreen Cabrera [facility]  
Work Phone:   
[phone]  
   
                                        Start: 2021   Most recent diastoli  
c   
blood pressure 80-89 mm   
hg                                                  Doreen Cabrera [facility]  
Work Phone:   
[phone]  
   
                                        Start: 2021   Most recent systolic  
   
blood press 130-139mm hg                            Doreen Cabrera [facility]  
Work Phone:   
[phone]  
   
                                        Start: 2021   Psychotherapy w/tabatha  
ent   
30 minutes                                          Avis Felder   
[facility]  
Work Phone:   
[phone]  
   
                                        Start: 2021   Pt scrnd tobacco use  
 rcvd   
tobacco cessation talk                              Doreen Cabrera [facility]  
Work Phone:   
[phone]  
   
                          Start: 2021 COVID-19, RAPID              Seth Rahman MD  
Work Phone:   
[phone]  
   
                          Start: 2021 Drug screen class list a              
  Seth Rahman MD  
Work Phone:   
[phone]  
   
                                        Start: 2021   Urnls dip stick/tabl  
et   
reagent auto microscopy                             Seth Rahman MD  
Work Phone:   
[phone]  
   
                          Start: 2021 Assay of acetaminophen                
Seth Rahman MD  
Work Phone:   
[phone]  
   
                          Start: 2021 Assay of ethanol              Meghna Rahman MD  
Work Phone:   
[phone]  
   
                          Start: 2021 Assay of salicylate              Cip  
alysia Rahman MD  
Work Phone:   
[phone]  
   
                                        Start: 2021   Comprehensive metabo  
lic   
panel                                               Seth Rahman MD  
Work Phone:   
[phone]  
   
                                        Start: 2021   Ecg routine ecg w/le  
ast   
12 lds w/i&r                                        Seth Ramhan MD  
Work Phone:   
[phone]  
   
                                        Start: 2021   Most recent diastoli  
c   
blood pressure < 80 mm hg                           Doreenpaola Cabrera [facility]  
Work Phone:   
[phone]  
   
                                        Start: 2021   Most recent systolic  
   
blood pressure <130 mm hg                           [facility]  
Work Phone:   
[phone]  
   
                                        Start: 2021   Psychotherapy w/tabatha  
ent   
30 minutes                                          Leonie Short LISWS  
Work Phone:   
[phone]  
   
                          Start: 2021 COVID-19, RAPID              Malika Shepherd MD  
Work Phone:   
[phone]  
   
                          Start: 2021 Assay of acetaminophen                
Malika Shepherd MD  
Work Phone:   
[phone]  
   
                          Start: 2021 Assay of ethanol              Malika Shepherd MD  
Work Phone:   
[phone]  
   
                          Start: 2021 Assay of salicylate              Maykel Shepherd MD  
Work Phone:   
[phone]  
   
                                        Start: 2021   Comprehensive metabo  
lic   
panel                                               Malika Shepherd MD  
Work Phone:   
[phone]  
   
                          Start: 2021 Drug screen class list mann Shepherd MD  
Work Phone:   
[phone]  
   
                                        Start: 2021   Urinalysis microscop  
ic   
only                                                Malika Shepherd MD  
Work Phone:   
[phone]  
   
                                        Start: 2021   Urnls dip stick/tabl  
et   
rgnt auto w/o microscopy                            Malika Shepherd MD  
Work Phone:   
[phone]  
   
                                        Start: 2021   Most recent diastoli  
c   
blood pressure 80-89 mm   
hg                                                  [facility] CNP  
Work Phone:   
[phone]  
   
                                        Start: 2021   Most recent systolic  
   
blood pressure <130 mm hg                           [facility] CNP  
Work Phone:   
[phone]  
   
                                        Start: 2021   Psychotherapy w/tabatha  
ent   
30 minutes                                          Leonie Short LISWS  
Work Phone:   
[phone]  
   
                                        Start: 2021   Ecg routine ecg w/le  
ast   
12 lds w/i&r                                        Rafael Andes DO  
Work Phone:   
[phone]  
   
                          Start: 2021 Assay of acetaminophen                
Rafael Andes DO  
Work Phone:   
[phone]  
   
                          Start: 2021 Assay of ethanol              Rafael  
 Andes DO  
Work Phone:   
[phone]  
   
                          Start: 2021 Assay of salicylate              Jus  
tin Andes DO  
Work Phone:   
[phone]  
   
                                        Start: 2021   Comprehensive metabo  
lic   
panel                                               Rafael Andes DO  
Work Phone:   
[phone]  
   
                          Start: 2021 SPECIMEN REJECTION              Just  
in Andes DO  
Work Phone:   
[phone]  
   
                          Start: 2021 COVID-19, RAPID              Rafael   
Andes DO  
Work Phone:   
[phone]  
   
                          Start: 2021 Drug screen class list a              
  Rafael Andes DO  
Work Phone:   
[phone]  
   
                                        Start: 2021   Urnls dip stick/tabl  
et   
reagent auto microscopy                             Seth Rahman MD  
Work Phone:   
[phone]  
   
                                        Start: 06-   Most recent diastoli  
c   
blood pressure 80-89 mm   
hg                                                  [facility]  
Work Phone:   
[phone]  
   
                                        Start: 06-   Most recent systolic  
   
blood pressure <130 mm hg                           Doreen Cabrera CNP  
Work Phone:   
[phone]  
   
                                        Start: 06-   Psychotherapy w/tabatha  
ent   
30 minutes                                          Leonie Garrett LISWS  
Work Phone:   
[phone]  
   
                                        Start: 2021   Ear Pressure Equaliz  
ation   
Tube, Insertion,   
Bilaterally                                         Doreen Cabrera CNP  
Work Phone:   
[phone]  
   
                                        Start: 2021   Most recent diastol   
blood   
pres >/equal 90 mm hg                               Doreen Cabrera CNP  
Work Phone:   
[phone]  
   
                                        Start: 2021   Most recent systolic  
   
blood pres>/equal 140 mm   
hg                                                  Doreen Cabrera CNP  
Work Phone:   
[phone]  
   
                                        Start: 2021   Pt-focused hlth risk  
   
assmt score doc stnd   
instrm                                              Doreen Cabrera CNP  
Work Phone:   
[phone]  
   
                          Start: 2021 Tonsillectomy              Doreenpaola cuevas CNP  
Work Phone:   
[phone]  
  
  
  
Plan of Treatment  
  
  
                          Date         Care Activity Detail       Author  
   
                                Start: 10- FQHC visit, estab pt Medical E  
stablished   
Patient                                 [facility]  
Work Phone:   
[phone]  
   
                                        Start: 10-   CBC W Auto Different  
ial   
panel - Blood                                       [facility]  
   
                                Start: 2024 FQHC visit, estab pt Medical E  
stablished   
Patient                                 [facility]  
Work Phone:   
[phone]  
   
                                                    Start: 2024  
End: 2024                         Patient education based   
on identified need                                  [facility]  
   
                          Start: 2024              US Transvaginal ([id number])  
 [facility]  
   
                                                    Start: 2024  
End: 2024                         Patient education based   
on identified need                                  [facility]  
   
                                Start: 2024 FQHC visit, estab pt Medical E  
stablished   
Patient                                 [facility]  
Work Phone:   
[phone]  
   
                                                    Start: 2024  
End: 2024                         Patient education based   
on identified need                                  [facility]  
   
                                        Start: 2024   Ferritin [Mass/volum  
e]   
in Serum or Plasma                                  [facility]   
[place]  
   
                                                    Start: 2024  
End: 2024                         Patient education based   
on identified need                                  [facility]  
   
                                Start: 2024 FQHC visit, estab pt Medical E  
stablished   
Patient                                 [facility]  
Work Phone:   
[phone]  
   
                                Start: 2024                 All 3 Urine Te  
st   
Gonorrhea-Chlamydia-Tri  
[facility]  
   
                                                    Start: 2024  
End: 2024                         Patient education based   
on identified need                                  [facility]  
   
                                                    Start: 2023  
End: 2023                         Patient education based   
on identified need                                  [facility]  
   
                          Start: 2023              Psychiatry   [facility]  
Work Phone:   
[phone]  
   
                                        Comment on above:   Note: Please make a   
referral to: see if dr alonso accepting   
now,   
otherwise ok with arminda or edy   
   
                                Start: 2023 FQHC visit, estab pt Medical E  
stablished   
Patient                                 [facility]  
Work Phone:   
[phone]  
   
                                                    Start: 10-  
End: 10-                         Patient education based   
on identified need                                  [facility]  
   
                                Start: 2022                 All 3 Urine Te  
st   
Gonorrhea-Chlamydia-Tri  
[facility]  
   
                                Start: 09- FQHC visit, estab pt Medical E  
stablished   
Patient                                 [facility]  
Work Phone:   
[phone]  
   
                                                    Start: 2022  
End: 2022                         Patient education based   
on identified need                      [facility] offered active   
listening and   
supportive feedback;   
normalized emotions and   
feelings, also provided   
pt time to process any   
current stressors.   
~Promoted and   
encouraged   
follow-through with   
scheduling psychiatric   
services                                [facility]  
   
                                                    Start: 2022  
End: 2022                         Patient education based   
on identified need                                  [facility]  
   
                          Start: 2022                           Health Par  
[facility]  
   
                          Start: 2022 FQHC visit, estab pt              Ti  
[facility]  
Work Phone:   
[phone]  
   
                                        Comment on above:   Note: Please make a   
referral to: request dr alonso   
   
                                                    Start: 2022  
End: 2022                         Patient education based   
on identified need                                  [facility]  
   
                                                    Start: 2022  
End: 2022                         Patient education based   
on identified need                      [facility]   
[facility]   
[facility] ~[facility] offered   
active listening and   
supportive feedback;   
normalized emotions and   
feelings, also provided   
pt time to process any   
current stressors ~[facility]   
discussed potential   
benefits of counseling   
and supported   
re-engaging, as needed.   
~[facility] encouraged pt to   
continue to make time   
to implement self-care   
regimen and use coping   
methods, as needed                      [facility]  
   
                                        Start: 2022   CBC W Auto Different  
ial   
panel - Blood                                       [facility]  
   
                                        Start: 2022   Lipid 1996 panel - S  
wayne   
or Plasma                 LIPID PROFILE             [facility]  
   
                                                    Start: 2022  
End: 2022                         Patient education based   
on identified need                                  [facility]  
   
                          Start: 2022 Influenza vaccination Flu vaccine (#  
1) [facility]  
   
                                        Start: 2021   COVID-19 Vaccine (3   
-   
Booster for Pfizer   
series)                                 COVID-19 Vaccine (3 -   
Booster for Pfizer   
series)                                 [facility]  
   
                          Start: 10-                           [facility]  
   
                                Start: 2021 FQHC visit, estab pt Medical E  
stablished   
Patient                                 [facility]  
Work Phone:   
[phone]  
   
                          Start: 2021                           [facility]  
Work Phone:   
[phone]  
   
                                        Comment on above:   Note: Please make a   
referral to: [address] [address]FK: [phone]:   
[phone]   
   
                                                            Note: Please make a   
referral to: [facility] , no longer wants to   
see vinny   
   
                                                    Start: 2021  
End: 2021                         Patient education based   
on identified need                                  [facility]  
   
                          Start: 2021 Influenza vaccination              M  
[facility]  
Work Phone:   
[phone]  
   
                                                    Start: 2021  
End: 2021                         Patient education based   
on identified need                                  [facility]  
   
                          Start: 2021              SARS-CoV-2, PAMELA [facility]  
   
                                                    Start: 2021  
End: 2021                         Patient education based   
on identified need                                  [facility]  
   
                                                    Start: 06-  
End: 06-                         Patient education based   
on identified need                                  [facility]  
   
                                                    Start: 2021  
End: 2021                         Patient education based   
on identified need                                  [facility]  
   
                                        Start: 2020   DTaP/Tdap/Td vaccine  
 (7   
- Td or Tdap)                           DTaP/Tdap/Td vaccine (7   
- Td or Tdap)                           [facility]  
   
                                        Start: 2020   Screening for malign  
ant   
neoplasm of cervix                                  [facility]   
[facility]  
   
                                        Start: 2018   DTaP/Tdap/Td vaccine  
 (1   
- Tdap)                                 DTaP/Tdap/Td vaccine (1   
- Tdap)                                 [facility]  
Work Phone:   
[phone]  
   
                          Start: 2017 Hepatitis C screening Hepatitis C sc  
reen [facility]   
[facility]  
   
                                        Start: 2015   Screening for Chlamy  
argelia   
trachomatis                                         [facility]   
[facility]  
   
                          Start: 2014 HIV screening HIV screen   [facility]  
Work Phone:   
[phone]  
   
                          Start: 2011 COVID-19 Vaccine (1) COVID-19 Vaccin  
e (1) [facility]  
Work Phone:   
[phone]  
   
                          Start: 2011 [facility]  
[facility]  
   
                                        Start: 2010   HPV vaccine (1 - 2-d  
ose   
series)                                 HPV vaccine (1 - 2-dose   
series)                                 [facility]   
[facility]  
   
                                        Start: 2000   Varicella vaccine (1  
 of   
2 - 2-dose childhood   
series)                                 Varicella vaccine (1 of   
2 - 2-dose childhood   
series)                                 [facility]  
   
                          Start: 1999 Hepatitis C screening Hepatitis C sc  
reen [facility]  
Work Phone:   
[phone]  
   
                                                      
End: 10-                         C.trachomatis   
N.gonorrhoeae DNA, Urine                            [facility]  
[employer] Phone:   
[phone]  
   
                                        Comment on above:   Once for 1 Occurrenc  
es starting 10/21/2022 until 10/21/2022   
   
                                                EKG 12 Lead     EKG 12 Lead ECG   
STAT   
2021 3:18 PM EDT                  [facility]  
Work Phone:   
[phone]  
   
                                                      
End: 10-                         Trichomonas Vaginali,   
Molecular                                           [facility]  
Work Phone:   
[phone]  
   
                                        Comment on above:   Once for 1 Occurrenc  
es starting 10/21/2022 until 10/21/2022   
  
  
  
Immunizations  
  
  
                      Immunization Date Immunization Notes      Care Provider Mandeep deshpande  
   
                                        2024          influenza, injectabl  
e,   
quadrivalent,   
preservative free;   
Translations: [Fluarix]                             [facility]  
Work Phone:   
[phone]                          [facility]  
   
                                        Comment on above:   Note: Patient tolera  
geraldine well. No signs or symptoms of adverse   
reactions. Patient waited a minimum of 15 minutes.   
   
                                        2024          Imm.Admin. over 18 y  
rs   
Any Route FIRST Injection   
(Rendering physician   
modifier)                                           [facility]  
Work Phone:   
[phone]                          [facility]  
   
                                        2024          Human Papillomavirus  
   
9-valent vaccine;   
Translations: [GARDASIL   
9]                                                  [facility]  
Work Phone:   
[phone]                          [facility]  
   
                                        2024          pneumococcal vaccine  
,   
unspecified formulation                             [facility]  
Work Phone:   
[phone]                          [facility]  
   
                                        Comment on above:   Note: Patient tolera  
geraldine well. No signs or symptoms of adverse   
reactions. Patient waited a minimum of 15 minutes.   
   
                                        2024          Imm.Admin.Through 17  
 yrs   
Any Route FIRST Injection                           Doreen Cabrera CNP  
Work Phone:   
[phone]                          [facility]  
   
                                        2024          Ea.Addnl.Imm.Admin.T  
hroug  
h 17 yrs Any Route                                  Doreen Cabrera CNP  
Work Phone:   
[phone]                          [facility]  
   
                                        2024          VFC Imm. Admin. thro  
ugh   
17 yrs Any Route per C   
Injection (Signi/Sep   
Eval. & Man.)                                       Doreen Cabrera CNP  
Work Phone:   
[phone]                          [facility]  
   
                                        10-          influenza, injectabl  
e,   
quadrivalent,   
preservative free;   
Translations: [Fluarix]                             Doreen Cabrera CNP  
Work Phone:   
[phone]                          [facility]  
   
                                        Comment on above:   Note: Patient tolera  
geraldine well. No signs or symptoms of adverse   
reactions. Patient waited a minimum of 15 minutes.   
   
                                        10-          Imm.Admin. over 18 y  
rs   
Any Route FIRST Injection                           [facility]  
Work Phone:   
[phone]                          [facility]  
   
                                        2021          1st Dose PFIZER COVI  
D-19   
Vaccine                                             [facility]  
Work Phone:   
[phone]                          [facility]  
   
                                        2021          1st Dose PFIZER COVI  
D-19   
Vaccine                                             [facility]  
Work Phone:   
[phone]                          [facility]  
   
                                        2016          meningococcal   
oligosaccharide (groups   
A, C, Y and W-135)   
diphtheria toxoid   
conjugate vaccine (MCV4O)                           [facility]  
Work Phone:   
[phone]                          [facility]  
   
                                        2010          tetanus toxoid, redu  
kyleigh   
diphtheria toxoid, and   
acellular pertussis   
vaccine, adsorbed                                   [facility]  
Work Phone:   
[phone]                          [facility]  
   
                                        2004          measles, mumps and   
rubella virus vaccine                               [facility]  
Work Phone:   
[phone]                          [facility]  
   
                                        2004          poliovirus vaccine,   
inactivated                                         [facility]  
Work Phone:   
[phone]                          [facility]  
   
                                        2000          measles, mumps and   
rubella virus vaccine                               [facility]  
Work Phone:   
[phone]                          [facility]  
   
                                        2000          poliovirus vaccine,   
inactivated                                         [facility]  
Work Phone:   
[phone]                          [facility]  
   
                                        2000          haemophilus influenz  
ae   
type b conjugate and   
Hepatitis B vaccine                                 [facility]  
Work Phone:   
[phone]                          [facility]  
   
                                        1999          poliovirus vaccine,   
inactivated                                         [facility]  
Work Phone:   
[phone]                          [facility]  
   
                                        1999          diphtheria, tetanus   
toxoids and pertussis   
vaccine                                             [facility]  
Work Phone:   
[phone]                          [facility]  
   
                                        1999          trivalent poliovirus  
   
vaccine, live, oral                                 Doreen Cabrera CNP  
Work Phone:   
[phone]                          [facility]  
   
                                        1999          hepatitis B vaccine,  
   
pediatric or   
pediatric/adolescent   
dosage                                              Doreen Cabrera CNP  
Work Phone:   
[phone]                          [facility]  
   
                                        1999          hepatitis B vaccine,  
   
pediatric or   
pediatric/adolescent   
dosage                                              Doreen Cabrera CNP  
Work Phone:   
[phone]                          [facility]  
  
  
  
Payers  
  
  
                          Date         Payer Category Payer        Policy ID  
   
                          2021   Unknown                   [id number]   
[id number]  
   
                          1999   Unknown                   [id number] 2.16.84  
[id number]  
   
                          1999   Unknown                   [id number] [id number]  
[id number]  
   
                          1999   Unknown                   [id number] 2.16.8  
[id number]  
   
                          1999   Unknown                   [id number] [id number]  
[id number]  
   
                          1999   Unknown                   [id number] [id number]  
[id number]  
   
                          1999   Unknown                   [id number] 2.16.8  
[id number]  
   
                          1999   Unknown                   [id number] 2.16.  
[id number]  
   
                          1999   Unknown                   [id number] 2.16.8  
[id number]  
   
                          1999   Unknown                   [id number] 2.16.8  
[id number]  
   
                          1960   Private Health Insurance              [id number]  
   
                                       Unknown                   [id number]  
  
  
  
Social History  
  
  
                          Date         Type         Detail       Facility  
   
                                       Assertion    Family problems (finding) [facility]  
[facility]  
   
                                                Assertion       Problem situatio  
n   
relating to social and   
personal history   
(finding)                               [facility]  
   
                                                Assertion       Emotional stress  
   
(finding)                               [facility]  
   
                                                Assertion       Lives with parobed  
ts   
(finding)                               [facility]  
   
                                       Assertion    Social drinker (finding) [facility]  
[facility]  
   
                                                Assertion       Benzodiazepine m  
isuse   
(finding)                               [facility]  
   
                                       Assertion    Sexually active (finding) He  
[facility]  
   
                                       Assertion                 [facility]  
   
                                                Assertion       Gender identity   
finding   
(finding)                               [facility]  
   
                                                Assertion       Finding of sexua  
l   
orientation (finding)                   [facility]  
   
                                                            Tobacco smoking   
status                    Unknown if ever smoked    [facility]  
Work Phone:   
[phone]  
   
                                                    Start: 10-  
End: 2021                         Tobacco smoking   
status NHIS               Never smoker              [employer] Phone:   
[phone]  
   
                                                    Start: 10-  
End: 2021                         Tobacco use and   
exposure                  Never used                [facility]  
   
                                                    Start: 2021  
End: 2021     Alcohol intake      Ex-drinker (finding) [employer] Phone:   
[phone]  
   
                                        Start: 2021   History SDOH Alcohol  
   
Frequency                 1                         [employer] Phone:   
[phone]  
   
                                        Start: 1999   Sex Assigned At   
Birth                     Not on file               [employer] Phone:   
[phone]  
   
                                                            Exposure to   
SARS-CoV-2 (event)        Not sure                  [employer]       Smoking monitori  
ng status   
(finding)                               [facility]  
Work Phone:   
[phone]  
   
                                                Assertion       Cigarette smoker  
   
(finding)                               [facility]  
   
                                                [facility]       Light cigarette   
smoker   
(1-9 cigs/day) (finding)                [facility]  
   
                                                Assertion       Exposure to poll  
ution   
(event)                                 [facility]  
   
                                       Assertion    Smoker (finding) [facility]  
   
                                                Assertion       Finding relating  
 to   
sexual activity (finding)               [facility]  
   
                                                Assertion       Homosexual behav  
ior   
(finding)                               [facility]  
   
                                                Assertion       Currently not se  
xually   
active (finding)                        [facility]  
   
                                                Assertion       Details of drug   
misuse   
behavior (observable   
entity)                                 [facility]  
   
                                                Assertion       History of disor  
danny   
(situation)                             [facility]  
   
                                                    NEGATED: Highlighted   
row                       Assertion                 Current drinker of   
alcohol (finding)                       [facility]  
   
                                                    NEGATED: Highlighted   
row                       Assertion                 Finding relating to drug   
misuse behavior (finding)               [facility]   
[place]  
   
                                                    NEGATED: Highlighted   
row                 Assertion                               [facility]  
   
                                                    NEGATED: Highlighted   
row                       Assertion                 Exposure to pollution   
(event)                                 [facility]  
  
  
  
Medical Equipment  
  
  
                                Procedure Code  Equipment Code  Equipment Origin  
al   
Text                                    Equipment   
Identifier                              Dates  
   
                                                                Blood Glucose Te  
st In   
Vitro Strip               [id number]                  Start:   
2024  
End:   
2024  
   
                                                                [employer] Lancets  
   
Miscellaneous             [id number]                  Start:   
2024  
   
                                                                OneTouch Ultra I  
n   
Vitro Strip               [id number]                  Start:   
2024  
End:   
2025  
  
  
  
Mental Status  
  
  
                          Date         Assessment   Result       Facility  
   
                                2020      Cognitive function A previous haider  
icide attempt    
with hospitalization at [address] [place] [address]  
Work Phone:   
[phone]  
   
                                                Cognitive function Cognitive fun  
ctioning was   
normal Cognitive function   
finding (finding)                       [facility]  
Work Phone:   
[phone]  
  
  
  
Clinical Notes 2021 to 10-  
  
  
                                Note Date & Type Note            Facility  
  
  
  
                                        10- Evaluation note   
  
  
Includes: Assessments for all patient encounters  
  
  
  
                                                    Findings  
   
                                                    [D50.9 - Iron deficiency ane  
jennifer,   
unspecified] iron deficiency   
anemia                                  Chart Update with Doreen   
Deborah DIAS                              10/07/2024  
  
  
  
                                                    Last Documented On 10/09/202  
4 2:06PM ; [facility]  
  
  
  
                                        Attention-deficit hyperactivity disorder  
  Established Patient with Radha Young \[facility]""                               2024  
  
  
  
                                                    Last Documented On   
4 4:15PM ; [facility]  
  
  
  
                                                    Bipolar I disorder, most rec  
ent   
episode, depressed - mild                Established Patient with Radha Young \[facility]\""                               2024  
  
  
  
                                                    Last Documented On   
4 4:15PM ; [facility]  
  
  
  
                                Nicotine dependence  Established Patient with   
Radha Young W 2024  
  
  
  
                                                    Last Documented On   
4 4:15PM ; [facility]  
  
  
  
                                        Post-traumatic stress disorder  Establ  
ished Patient with Radha Young   
\[facility]\""                                     2024  
  
  
  
                                                    Last Documented On   
4 4:15PM ; [facility]  
  
  
  
                                                    [Z68.43 - Body mass index [B  
MI]   
50.0-59.9, adult] assessment of body   
mass index                              Medical Established Patient with   
Doreen Cabrera LARISSA                        2024  
  
  
  
                                                    Last Documented On 10/09/202  
4 2:09PM ; [facility]  
  
  
  
                                                    Bipolar I disorder, most rec  
ent   
episode, depressed - mild               Medical Established Patient with Doreen   
Deborah CNP                              2024  
  
  
  
                                                    Last Documented On 10/09/202  
4 2:09PM ; [facility]  
  
  
  
                                Caries          Medical Established Patient with  
 Doreen Deborah CNP 2024  
  
  
  
                                                    Last Documented On 10/09/202  
4 2:09PM ; [facility]  
  
  
  
                                        Encounter for Immunization Medical Estab  
lished Patient with Doreen Deborah   
CNP                                     2024  
  
  
  
                                                    Last Documented On 10/09/202  
4 2:09PM ; [facility]  
  
  
  
                                                    Type 2 diabetes mellitus wit  
hout   
complication                            Medical Established Patient with   
Doreen Deborah CNP                        2024  
  
  
  
                                                    Last Documented On 10/09/202  
4 2:09PM ; [facility]  
  
  
  
                                        Attention-deficit hyperactivity disorder  
  Established Patient with   
Leonie Short [facility]                     2024  
  
  
  
                                                    Last Documented On   
4 3:28PM ; [facility]  
  
  
  
                                                    Bipolar I disorder, most rec  
ent   
episode, depressed - mild               BH Established Patient with Leonie   
Short [facility]                             2024  
  
  
  
                                                    Last Documented On   
4 3:28PM ; [facility]  
  
  
  
                                        Post-traumatic stress disorder BH Establ  
ished Patient with Leonie Short   
[facility]                                   2024  
  
  
  
                                                    Last Documented On   
4 3:28PM ; [facility]  
  
  
  
                                                    [E11.9 - Type 2 diabetes lobito  
litus   
without complications] type 2 diabetes   
mellitus                                Medical Established Patient with   
Doreen Deborah CNP                        2024  
  
  
  
                                                    Last Documented On   
4 7:36PM ; [facility]  
  
  
  
                                                    [F41.1 - Generalized anxiety  
   
disorder] generalized anxiety   
disorder                                Medical Established Patient with   
Doreen Deborah CNP                        2024  
  
  
  
                                                    Last Documented On   
4 7:36PM ; [facility]  
  
  
  
                                                    [I10 - Essential (primary)   
hypertension] essential hypertension    Medical Established Patient with   
Doreen Deborah CNP                        2024  
  
  
  
                                                    Last Documented On   
4 7:36PM ; [facility]  
  
  
  
                                                    [N94.6 - Dysmenorrhea, unspe  
cified]   
dysmenorrhea                            Medical Established Patient with   
Doreen Deborah CNP                        2024  
  
  
  
                                                    Last Documented On   
4 7:36PM ; [facility]  
  
  
  
                                                    [Z68.43 - Body mass index [B  
MI]   
50.0-59.9, adult] assessment of body   
mass index                              Medical Established Patient with   
Doreen Cabrera CNP                        2024  
  
  
  
                                                    Last Documented On   
4 7:36PM ; [facility]  
  
  
  
                                        Encounter for Immunization Medical Estab  
lished Patient with Doreen Cabrera   
CNP                                     2024  
  
  
  
                                                    Last Documented On   
4 7:36PM ; [facility]  
  
  
  
                                        Venipuncture was performed Medical Estab  
lished Patient with Doreen Cabrera   
CNP                                     2024  
  
  
  
                                                    Last Documented On   
4 7:36PM ; [facility]  
  
  
  
                                        Attention-deficit hyperactivity disorder  
  Established Patient with   
Leonie Short [facility]                     2024  
  
  
  
                                                    Last Documented On   
4 10:10AM ; [facility]  
  
  
  
                                                    Bipolar I disorder, most rec  
ent   
episode, depressed - mild                Established Patient with Leonie   
Short [facility]                             2024  
  
  
  
                                                    Last Documented On   
4 10:10AM ; [facility]  
  
  
  
                                        Post-traumatic stress disorder  Establ  
ished Patient with Leonie Short   
[facility]IS[facility]                                   2024  
  
  
  
                                                    Last Documented On   
4 10:10AM ; [facility]  
  
  
  
                                        [D64.9 - Anemia, unspecified] anemia Med  
ical Established Patient with   
Doreen Cabrera CNP                        2024  
  
  
  
                                                    Last Documented On   
4 6:51PM ; [facility]  
  
  
  
                                                    [Z68.43 - Body mass index [B  
MI]   
50.0-59.9, adult] assessment of body   
mass index                              Medical Established Patient with   
Doreen Cabrera CNP                        2024  
  
  
  
                                                    Last Documented On   
4 6:51PM ; [facility]  
  
  
  
                                                    Bipolar affective disorder,   
current   
episode depressed, mild                  Established Patient with Leonie   
Short [facility]                             2024  
  
  
  
                                                    Last Documented On   
4 5:16PM ; [facility]  
  
  
  
                                        Post-traumatic stress disorder  Establ  
ished Patient with Leonie Short   
LIS[facility]                                   2024  
  
  
  
                                                    Last Documented On   
4 5:16PM ; [facility]  
  
  
  
                                                    Undifferentiated attention d  
eficit   
disorder                                 Established Patient with   
Leonie Short [facility]                     2024  
  
  
  
                                                    Last Documented On   
4 5:16PM ; [facility]  
  
  
  
                                        Visit for: screening for disorder BH Est  
ablished Patient with Leonie   
Short [facility]                             2024  
  
  
  
                                                    Last Documented On   
4 5:16PM ; [facility]  
  
  
  
                                                    [Z68.43 - Body mass index [B  
MI]   
50.0-59.9, adult] assessment of body   
mass index                              Medical Established Patient with   
Doreen Cabrera CNP                        2024  
  
  
  
                                                    Last Documented On   
4 7:50PM ; [facility]  
  
  
  
                                        Attention-deficit hyperactivity disorder  
 Medical Established Patient with   
Doreen Cabrera CNP                        2024  
  
  
  
                                                    Last Documented On   
4 7:50PM ; [facility]  
  
  
  
                                                    Diabetes Risk Test Score was  
 three   
score 3/27/2024                         Medical Established Patient with Doreen Cabrera CNP                              2024  
  
  
  
                                                    Last Documented On   
4 7:50PM ; [facility]  
  
  
  
                                        Visit for: screening for STD Medical Est  
ablished Patient with Doreen Cabrera   
CNP                                     2024  
  
  
  
                                                    Last Documented On   
4 7:50PM ; [facility]  
  
  
  
                                                    [Z68.32 - Body mass index [B  
MI]   
32.0-32.9, adult] assessment of body   
mass index                              Medical Established Patient with   
Doreen Cabrera CNP                        2023  
  
  
  
                                                    Last Documented On   
3 6:01PM ; [facility]  
  
  
  
                                        Borderline personality disorder Medical   
Established Patient with Doreen Cabrera CNP                              2023  
  
  
  
                                                    Last Documented On   
3 6:01PM ; [facility]  
  
  
  
                                        Screening for diabetes mellitus Medical   
Established Patient with Doreen Cabrera CNP                              2023  
  
  
  
                                                    Last Documented On   
3 6:01PM ; [facility]  
  
  
  
                                        Assessment of body mass index Medical Es  
tablished Patient with Doreen Cabrera CNP                              10/21/2022  
  
  
  
                                                    Last Documented On 10/21/202  
2 2:45PM ; [facility]  
  
  
  
                                                    Bipolar affective disorder,   
current   
episode manic                            [facility]                        [facility]  
  
  
  
                                                    Last Documented On   
2 3:22PM ; [facility]  
  
  
  
                                                    Bipolar affective disorder,   
current   
episode manic                           [facility]                        [facility]  
  
  
  
                                                    Last Documented On   
2 2:28PM ; [facility]  
  
  
  
                                                    Bipolar affective disorder,   
current   
episode depressed, severe with   
psychosis                                [facility] with Haylienicanor Aguilar [facility]                        2022  
  
  
  
                                                    Last Documented On   
2 3:29PM ; [facility]  
  
  
  
                                                    Assessment of body mass inde  
x [Body   
mass index [BMI] 50.0-59.9, adult]      Open Access - Established with Julieth   
Aliya CNP                               2022  
  
  
  
                                                    Last Documented On   
2 9:36AM ; [facility]  
  
  
  
                                                    Bipolar I disorder, most rec  
ent   
episode, manic                          Open Access - Established with Julieth   
Aliya CNP                               2022  
  
  
  
                                                    Last Documented On   
2 9:36AM ; [facility]  
  
  
  
                                        Post-traumatic stress disorder  Establ  
ished Patient with Haylienicanor Aguilar   
[facility]                                  2022  
  
  
  
                                                    Last Documented On   
2 3:20PM ; [facility]  
  
  
  
                                No cough        Medical Established Patient with  
 Doreen Deborah CNP 2022  
  
  
  
                                                    Last Documented On   
2 4:04PM ; [facility]  
  
  
  
                                                    Z68.43 - Body mass index [BM  
I]   
50.0-59.9, adult                        Medical Established Patient with   
Doreen Deborah CNP                        2022  
  
  
  
                                                    Last Documented On   
2 4:04PM ; [facility]  
  
  
  
                                                    Borderline personality disor  
danny Pt   
reported hx of sx/dx                     Established Patient with Haylienicanor Aguilar [facility]                        2022  
  
  
  
                                                    Last Documented On   
2 4:08PM ; [facility]  
  
  
  
                                                    Assessment of body mass inde  
x [Body   
mass index [BMI] 50.0-59.9, adult]      [facility] - Established with Julieth   
Aliya CNP                               2022  
  
  
  
                                                    Last Documented On   
2 7:41PM ; [facility]  
  
  
  
                                                    Diabetes Risk Test Score was  
 three   
score 2022                         Open Access - Established with Julieth   
Aliya CNP                               2022  
  
  
  
                                                    Last Documented On   
2 7:41PM ; [facility]  
  
  
  
                                                    Bipolar I disorder, most rec  
ent   
episode, manic                           Established Patient with Avis Felder [facility]                          2021  
  
  
  
                                                    Last Documented On   
1 1:35AM ; [facility]  
  
  
  
                                        Borderline personality disorder  Estab  
lished Patient with Avis   
Felder [facility]                          2021  
  
  
  
                                                    Last Documented On   
1 1:35AM ; [facility]  
  
  
  
                                        Post-traumatic stress disorder  Establ  
ished Patient with Avis   
Felder [facility]                          2021  
  
  
  
                                                    Last Documented On   
1 1:35AM ; [facility]  
  
  
  
                                                    Assessment of visit for: bhargavi myles for   
human immunodeficiency virus            Medical Established Patient with   
Doreen Deborah CNP                        2021  
  
  
  
                                                    Last Documented On   
1 2:56PM ; [facility]  
  
  
  
                                Nicotine dependence Medical Established Patient   
with Doreen Deborah CNP 2021  
  
  
  
                                                    Last Documented On   
1 2:56PM ; [facility]  
  
  
  
                                Tachycardia     Medical Established Patient with  
 Doreen Deborah CNP 2021  
  
  
  
                                                    Last Documented On   
1 2:56PM ; [facility]  
  
  
  
                                                    Z68.43 - Body mass index [BM  
I]   
50.0-59.9, adult                        Medical Established Patient with   
Doreen Deborah CNP                        2021  
  
  
  
                                                    Last Documented On   
1 2:56PM ; [facility]  
  
  
  
                                                    Bipolar I disorder, most rec  
ent   
episode, manic                           Established Patient with Leonie   
Short [facility]                             2021  
  
  
  
                                                    Last Documented On   
1 10:17AM ; [facility]  
  
  
  
                                                    Borderline personality disor  
danny per   
patient reported history                 Established Patient with Leonie   
Short [facility]                             2021  
  
  
  
                                                    Last Documented On   
1 10:17AM ; [facility]  
  
  
  
                                Nicotine dependence  Established Patient with   
Leonie Short [facility] 2021  
  
  
  
                                                    Last Documented On   
1 10:17AM ; [facility]  
  
  
  
                                        Post-traumatic stress disorder  Establ  
ished Patient with Leonie Short   
LIS[facility]                                   2021  
  
  
  
                                                    Last Documented On   
1 10:17AM ; [facility]  
  
  
  
                                Body mass index Medical Established Patient with  
 Doreen Deborah CNP 2021  
  
  
  
                                                    Last Documented On   
1 5:23PM ; [facility]  
  
  
  
                                Morbid obesity  Medical Established Patient with  
 Doreen Deborah CNP 2021  
  
  
  
                                                    Last Documented On   
1 5:23PM ; [facility]  
  
  
  
                                        Nicotine dependence uncomplicated Medica  
l Established Patient with Doreen   
Deborah CNP                              2021  
  
  
  
                                                    Last Documented On   
1 5:23PM ; [facility]  
  
  
  
                                                    Z68.42 - Body mass index [BM  
I]   
45.0-49.9, adult                        Medical Established Patient with   
Doreen Deborah CNP                        2021  
  
  
  
                                                    Last Documented On   
1 5:23PM ; [facility]  
  
  
  
                                Episodic mood disorders On Call with Pamela Velezammy edwards CNP 2021  
  
  
  
                                                    Last Documented On   
1 7:49AM ; [facility]  
  
  
  
                                                    Mood disorders, NOS per tabatha  
ent   
reported history                         Established Patient with Leonie   
Short [facility]                             2021  
  
  
  
                                                    Last Documented On   
1 7:15PM ; [facility]  
  
  
  
                                        Post-traumatic stress disorder  Establ  
ished Patient with Leonie Short   
LIS[facility]                                   2021  
  
  
  
                                                    Last Documented On   
1 7:15PM ; [facility]  
  
  
  
                                Morbid obesity  Medical Established Patient with  
 Doreen Deborah CNP 2021  
  
  
  
                                                    Last Documented On   
1 12:31PM ; [facility]  
  
  
  
                                Otitis externa  Medical Established Patient with  
 Doreen Deborah CNP 2021  
  
  
  
                                                    Last Documented On   
1 12:31PM ; [facility]  
  
  
  
                                                    Z68.42 - Body mass index [BM  
I]   
45.0-49.9, adult                        Medical Established Patient with   
Doreen Dbeorah CNP                        2021  
  
  
  
                                                    Last Documented On   
1 12:31PM ; [facility]  
  
  
  
                                        Post-traumatic stress disorder  Establ  
ished Patient with Leonie Short   
LISWS                                   06/10/2021  
  
  
  
                                                    Last Documented On 06/10/202  
1 10:05PM ; [facility]  
  
  
  
                                Morbid obesity  Medical Established Patient with  
 Doreen Deborah CNP 06/10/2021  
  
  
  
                                                    Last Documented On 06/10/202  
1 4:45PM ; [facility]  
  
  
  
                                                    Z68.42 - Body mass index [BM  
I]   
45.0-49.9, adult                        Medical Established Patient with   
Doreen Deborah CNP                        06/10/2021  
  
  
  
                                                    Last Documented On 06/10/202  
1 4:45PM ; [facility]  
  
  
  
                                        Post-traumatic stress disorder  Establ  
ished Patient with Leonie Short   
LIS[facility]                                   2021  
  
  
  
                                                    Last Documented On   
1 11:59AM ; [facility]  
  
  
  
                                                    Assessment of visit for: bhargavi myles for   
human immunodeficiency virus            Medical New Patient with Doreen Cabrera CNP                              2021  
  
  
  
                                                    Last Documented On   
1 4:06PM ; [facility]  
  
  
  
                                                    Diabetes Risk Test Score was  
 one score   
2021                               Medical New Patient with Doreen Cabrera CNP                              2021  
  
  
  
                                                    Last Documented On   
1 4:06PM ; [facility]  
  
  
  
                                Hypertension    Medical New Patient with Doreen esposito CNP 2021  
  
  
  
                                                    Last Documented On   
1 4:06PM ; [facility]  
  
  
  
                                Morbid obesity  Medical New Patient with Doreen esposito CNP 2021  
  
  
  
                                                    Last Documented On   
1 4:06PM ; [facility]  
  
  
  
                                Post-traumatic stress disorder Medical New Patie  
nt with Doreen Cabrera CNP   
2021  
  
  
  
                                                    Last Documented On   
1 4:06PM ; [facility]  
  
  
  
                                                    Z68.42 - Body mass index [BM  
I]   
45.0-49.9, adult                        Medical New Patient with Doreen Cabrera CNP                              2021  
  
  
  
                                                    Last Documented On   
1 4:06PM ; [facility]  
Work Phone: [phone] Evaluation note  
  
Includes: Assessments for all patient encounters  
  
  
  
                                Findings        Encounter       Date  
   
                                                    [D50.9 - Iron deficiency ane  
jennifer,   
unspecified] iron deficiency anemia Chart Update with Doreen Cabrera CNP   
10/07/2024  
  
  
  
                                                    Last Documented On 10/09/202  
4 2:06PM ; [facility]  
  
  
  
                                        Attention-deficit hyperactivity disorder  
  Established Patient with Radha Young LSW                               2024  
  
  
  
                                                    Last Documented On   
4 4:15PM ; [facility]  
  
  
  
                                                    Bipolar I disorder, most rec  
ent   
episode, depressed - mild                Established Patient with Radha Young LSW                               2024  
  
  
  
                                                    Last Documented On   
4 4:15PM ; [facility]  
  
  
  
                                Nicotine dependence  Established Patient with   
Radha Young LSW 2024  
  
  
  
                                                    Last Documented On   
4 4:15PM ; [facility]  
  
  
  
                                        Post-traumatic stress disorder  Establ  
ished Patient with Radha Young   
LSW                                     2024  
  
  
  
                                                    Last Documented On   
4 4:15PM ; [facility]  
  
  
  
                                                    [Z68.43 - Body mass index [B  
MI]   
50.0-59.9, adult] assessment of body   
mass index                              Medical Established Patient with   
Doreenpaola Mccormacken CNP                        2024  
  
  
  
                                                    Last Documented On 10/04/202  
4 1:56PM ; [facility]  
  
  
  
                                                    Bipolar I disorder, most rec  
ent   
episode, depressed - mild               Medical Established Patient with Doreen   
Deborah CNP                              2024  
  
  
  
                                                    Last Documented On 10/04/202  
4 1:56PM ; [facility]  
  
  
  
                                Caries          Medical Established Patient with  
 Doreen Deborah CNP 2024  
  
  
  
                                                    Last Documented On 10/04/202  
4 1:56PM ; [facility]  
  
  
  
                                        Encounter for Immunization Medical Estab  
lished Patient with Doreen Deborah   
CNP                                     2024  
  
  
  
                                                    Last Documented On 10/04/202  
4 1:56PM ; [facility]  
  
  
  
                                                    Type 2 diabetes mellitus wit  
hout   
complication                            Medical Established Patient with   
Doreen Deborah CNP                        2024  
  
  
  
                                                    Last Documented On 10/04/202  
4 1:56PM ; [facility]  
  
  
  
                                        Attention-deficit hyperactivity disorder  
  Established Patient with   
Leonie Short LISWS                     2024  
  
  
  
                                                    Last Documented On   
4 3:28PM ; [facility]  
  
  
  
                                                    Bipolar I disorder, most rec  
ent   
episode, depressed - mild                Established Patient with Leonie   
Short LISWS                             2024  
  
  
  
                                                    Last Documented On   
4 3:28PM ; [facility]  
  
  
  
                                        Post-traumatic stress disorder  Establ  
ished Patient with Leonie Short   
LISWS                                   2024  
  
  
  
                                                    Last Documented On   
4 3:28PM ; [facility]  
  
  
  
                                                    [E11.9 - Type 2 diabetes lobito  
litus   
without complications] type 2 diabetes   
mellitus                                Medical Established Patient with   
Doreen Deborah CNP                        2024  
  
  
  
                                                    Last Documented On   
4 7:36PM ; [facility]  
  
  
  
                                                    [F41.1 - Generalized anxiety  
   
disorder] generalized anxiety   
disorder                                Medical Established Patient with   
Doreen Deborah CNP                        2024  
  
  
  
                                                    Last Documented On   
4 7:36PM ; [facility]  
  
  
  
                                                    [I10 - Essential (primary)   
hypertension] essential hypertension    Medical Established Patient with   
Doreen Cabrera CNP                        2024  
  
  
  
                                                    Last Documented On   
4 7:36PM ; [facility]  
  
  
  
                                                    [N94.6 - Dysmenorrhea, unspe  
cified]   
dysmenorrhea                            Medical Established Patient with   
Doreen Cabrera CNP                        2024  
  
  
  
                                                    Last Documented On   
4 7:36PM ; [facility]  
  
  
  
                                                    [Z68.43 - Body mass index [B  
MI]   
50.0-59.9, adult] assessment of body   
mass index                              Medical Established Patient with   
Doreen Cabrera CNP                        2024  
  
  
  
                                                    Last Documented On   
4 7:36PM ; [facility]  
  
  
  
                                        Encounter for Immunization Medical Estab  
lished Patient with Doreen Cabrera   
CNP                                     2024  
  
  
  
                                                    Last Documented On   
4 7:36PM ; [facility]  
  
  
  
                                        Venipuncture was performed Medical Estab  
lished Patient with Doreen Cabrera   
CNP                                     2024  
  
  
  
                                                    Last Documented On   
4 7:36PM ; [facility]  
  
  
  
                                        Attention-deficit hyperactivity disorder  
  Established Patient with   
Leonie Short LISWS                     2024  
  
  
  
                                                    Last Documented On   
4 10:10AM ; [facility]  
  
  
  
                                                    Bipolar I disorder, most rec  
ent   
episode, depressed - mild               BH Established Patient with Leonie   
Short LISWS                             2024  
  
  
  
                                                    Last Documented On   
4 10:10AM ; [facility]  
  
  
  
                                        Post-traumatic stress disorder  Establ  
ished Patient with Leonie Short   
LISWS                                   2024  
  
  
  
                                                    Last Documented On   
4 10:10AM ; [facility]  
  
  
  
                                        [D64.9 - Anemia, unspecified] anemia Med  
ical Established Patient with   
Doreen Cabrera CNP                        2024  
  
  
  
                                                    Last Documented On   
4 6:51PM ; [facility]  
  
  
  
                                                    [Z68.43 - Body mass index [B  
MI]   
50.0-59.9, adult] assessment of body   
mass index                              Medical Established Patient with   
Doreen Cabrera CNP                        2024  
  
  
  
                                                    Last Documented On   
4 6:51PM ; [facility]  
  
  
  
                                                    Bipolar affective disorder,   
current   
episode depressed, mild                  Established Patient with Leonie   
Short LISWS                             2024  
  
  
  
                                                    Last Documented On   
4 5:16PM ; [facility]  
  
  
  
                                        Post-traumatic stress disorder BH Establ  
ished Patient with Leonie Short   
LISWS                                   2024  
  
  
  
                                                    Last Documented On   
4 5:16PM ; [facility]  
  
  
  
                                                    Undifferentiated attention d  
eficit   
disorder                                 Established Patient with   
Leonie Short LISWS                     2024  
  
  
  
                                                    Last Documented On   
4 5:16PM ; [facility]  
  
  
  
                                        Visit for: screening for disorder BH Est  
ablished Patient with Leonie   
Short LISWS                             2024  
  
  
  
                                                    Last Documented On   
4 5:16PM ; [facility]  
  
  
  
                                                    [Z68.43 - Body mass index [B  
MI]   
50.0-59.9, adult] assessment of body   
mass index                              Medical Established Patient with   
Doreen Cabrera CNP                        2024  
  
  
  
                                                    Last Documented On   
4 7:50PM ; [facility]  
  
  
  
                                        Attention-deficit hyperactivity disorder  
 Medical Established Patient with   
Doreenpaola Cabrera CNP                        2024  
  
  
  
                                                    Last Documented On   
4 7:50PM ; [facility]  
  
  
  
                                                    Diabetes Risk Test Score was  
 three   
score 3/27/2024                         Medical Established Patient with Doreenpaola Mccormacken CNP                              2024  
  
  
  
                                                    Last Documented On   
4 7:50PM ; [facility]  
  
  
  
                                        Visit for: screening for STD Medical Est  
ablished Patient with Doreenapola Cabrera   
CNP                                     2024  
  
  
  
                                                    Last Documented On   
4 7:50PM ; [facility]  
  
  
  
                                                    [Z68.32 - Body mass index [B  
MI]   
32.0-32.9, adult] assessment of body   
mass index                              Medical Established Patient with   
Doreenpaola Cabrera CNP                        2023  
  
  
  
                                                    Last Documented On   
3 6:01PM ; [facility]  
  
  
  
                                        Borderline personality disorder Medical   
Established Patient with Doreen   
Deborah CNP                              2023  
  
  
  
                                                    Last Documented On   
3 6:01PM ; [facility]  
  
  
  
                                        Screening for diabetes mellitus Medical   
Established Patient with Doreen   
Deborah CNP                              2023  
  
  
  
                                                    Last Documented On   
3 6:01PM ; [facility]  
  
  
  
                                        Assessment of body mass index Medical Es  
tablished Patient with Doreenpaola Mccormacken CNP                              10/21/2022  
  
  
  
                                                    Last Documented On 10/21/202  
2 2:45PM ; [facility]  
  
  
  
                                                    Bipolar affective disorder,   
current   
episode manic                            [facility] with Haylienicanor Aguilar LPCC-S                        2022  
  
  
  
                                                    Last Documented On   
2 3:22PM ; [facility]  
  
  
  
                                                    Bipolar affective disorder,   
current   
episode manic                            Established Patient with Haylienicanor Aguilar LPCC-S                        2022  
  
  
  
                                                    Last Documented On   
2 2:28PM ; [facility]  
  
  
  
                                                    Bipolar affective disorder,   
current   
episode depressed, severe with   
psychosis                                [facility] with Haylienicanor Aguilar LPCC-S                        2022  
  
  
  
                                                    Last Documented On   
2 3:29PM ; [facility]  
  
  
  
                                                    Assessment of body mass inde  
x [Body   
mass index [BMI] 50.0-59.9, adult]      Open Access - Established with Julieth   
Aliya CNP                               2022  
  
  
  
                                                    Last Documented On   
2 9:36AM ; [facility]  
  
  
  
                                                    Bipolar I disorder, most rec  
ent   
episode, manic                          Open Access - Established with Julieth   
Aliya CNP                               2022  
  
  
  
                                                    Last Documented On   
2 9:36AM ; [facility]  
  
  
  
                                        Post-traumatic stress disorder  Establ  
ished Patient with Haylienicanor Aguilar   
LPCC-S                                  2022  
  
  
  
                                                    Last Documented On   
2 3:20PM ; [facility]  
  
  
  
                                No cough        Medical Established Patient with  
 Doreen Deborah CNP 2022  
  
  
  
                                                    Last Documented On   
2 4:04PM ; [facility]  
  
  
  
                                                    Z68.43 - Body mass index [BM  
I]   
50.0-59.9, adult                        Medical Established Patient with   
Doreen Deborah CNP                        2022  
  
  
  
                                                    Last Documented On   
2 4:04PM ; [facility]  
  
  
  
                                                    Borderline personality disor  
danny Pt   
reported hx of sx/dx                     Established Patient with Haylienicanor Aguilar LPCC-S                        2022  
  
  
  
                                                    Last Documented On   
2 4:08PM ; [facility]  
  
  
  
                                                    Assessment of body mass inde  
x [Body   
mass index [BMI] 50.0-59.9, adult]      Open Access - Established with Julieth   
Aliya CNP                               2022  
  
  
  
                                                    Last Documented On   
2 7:41PM ; [facility]  
  
  
  
                                                    Diabetes Risk Test Score was  
 three   
score 2022                         Open Access - Established with Julieth   
Aliya CNP                               2022  
  
  
  
                                                    Last Documented On   
2 7:41PM ; [facility]  
  
  
  
                                                    Bipolar I disorder, most rec  
ent   
episode, manic                           Established Patient with Avis   
Felder LPCC-S                          2021  
  
  
  
                                                    Last Documented On   
1 1:35AM ; [facility]  
  
  
  
                                        Borderline personality disorder  Estab  
lished Patient with Avis   
Felder LPCC-S                          2021  
  
  
  
                                                    Last Documented On   
1 1:35AM ; [facility]  
  
  
  
                                        Post-traumatic stress disorder  Establ  
ished Patient with Avis   
Eflder LPCC-S                          2021  
  
  
  
                                                    Last Documented On   
1 1:35AM ; [facility]  
  
  
  
                                                    Assessment of visit for: bhargavi myles for   
human immunodeficiency virus            Medical Established Patient with   
Doreen Deborah CNP                        2021  
  
  
  
                                                    Last Documented On   
1 2:56PM ; [facility]  
  
  
  
                                Nicotine dependence Medical Established Patient   
with Doreen Deborah CNP 2021  
  
  
  
                                                    Last Documented On   
1 2:56PM ; [facility]  
  
  
  
                                Tachycardia     Medical Established Patient with  
 Doreen Deborah CNP 2021  
  
  
  
                                                    Last Documented On   
1 2:56PM ; [facility]  
  
  
  
                                                    Z68.43 - Body mass index [BM  
I]   
50.0-59.9, adult                        Medical Established Patient with   
Doreen Deborah CNP                        2021  
  
  
  
                                                    Last Documented On   
1 2:56PM ; [facility]  
  
  
  
                                                    Bipolar I disorder, most rec  
ent   
episode, manic                           Established Patient with Leonie   
Short LISWS                             2021  
  
  
  
                                                    Last Documented On   
1 10:17AM ; [facility]  
  
  
  
                                                    Borderline personality disor  
danny per   
patient reported history                 Established Patient with Leonie   
Short LISWS                             2021  
  
  
  
                                                    Last Documented On   
1 10:17AM ; [facility]  
  
  
  
                                Nicotine dependence  Established Patient with   
Leonie Short LISWS 2021  
  
  
  
                                                    Last Documented On   
1 10:17AM ; [facility]  
  
  
  
                                        Post-traumatic stress disorder  Establ  
ished Patient with Leonie Short   
LISWS                                   2021  
  
  
  
                                                    Last Documented On   
1 10:17AM ; [facility]  
  
  
  
                                Body mass index Medical Established Patient with  
 Doreen Deborah CNP 2021  
  
  
  
                                                    Last Documented On   
1 5:23PM ; [facility]  
  
  
  
                                Morbid obesity  Medical Established Patient with  
 Doreen Deborah CNP 2021  
  
  
  
                                                    Last Documented On   
1 5:23PM ; [facility]  
  
  
  
                                        Nicotine dependence uncomplicated Medica  
l Established Patient with Doreen   
Deborah CNP                              2021  
  
  
  
                                                    Last Documented On   
1 5:23PM ; [facility]  
  
  
  
                                                    Z68.42 - Body mass index [BM  
I]   
45.0-49.9, adult                        Medical Established Patient with   
Doreen Deborah CNP                        2021  
  
  
  
                                                    Last Documented On   
1 5:23PM ; [facility]  
  
  
  
                                Episodic mood disorders On Call with Pamela edwards CNP 2021  
  
  
  
                                                    Last Documented On   
1 7:49AM ; [facility]  
  
  
  
                                                    Mood disorders, NOS per tabatha  
ent   
reported history                         Established Patient with Leonie   
Short LISWS                             2021  
  
  
  
                                                    Last Documented On   
1 7:15PM ; [facility]  
  
  
  
                                        Post-traumatic stress disorder  Establ  
ished Patient with Leonie Short   
LISWS                                   2021  
  
  
  
                                                    Last Documented On   
1 7:15PM ; [facility]  
  
  
  
                                Morbid obesity  Medical Established Patient with  
 Doreen Deborah CNP 2021  
  
  
  
                                                    Last Documented On   
1 12:31PM ; [facility]  
  
  
  
                                Otitis externa  Medical Established Patient with  
 Doreen Deborah CNP 2021  
  
  
  
                                                    Last Documented On   
1 12:31PM ; [facility]  
  
  
  
                                                    Z68.42 - Body mass index [BM  
I]   
45.0-49.9, adult                        Medical Established Patient with   
Doreen Deborah CNP                        2021  
  
  
  
                                                    Last Documented On   
1 12:31PM ; [facility]  
  
  
  
                                        Post-traumatic stress disorder  Establ  
ished Patient with Leonie Short   
LISWS                                   06/10/2021  
  
  
  
                                                    Last Documented On 06/10/202  
1 10:05PM ; [facility]  
  
  
  
                                Morbid obesity  Medical Established Patient with  
 Doreen Cabrera CNP 06/10/2021  
  
  
  
                                                    Last Documented On 06/10/202  
1 4:45PM ; [facility]  
  
  
  
                                                    Z68.42 - Body mass index [BM  
I]   
45.0-49.9, adult                        Medical Established Patient with   
Doreenpaola Cabrera CNP                        06/10/2021  
  
  
  
                                                    Last Documented On 06/10/202  
1 4:45PM ; [facility]  
  
  
  
                                        Post-traumatic stress disorder BH Establ  
ished Patient with Leonie Short   
LISWS                                   2021  
  
  
  
                                                    Last Documented On   
1 11:59AM ; [facility]  
  
  
  
                                                    Assessment of visit for: bhargavi myles for   
human immunodeficiency virus            Medical New Patient with Doreen   
Deborah CNP                              2021  
  
  
  
                                                    Last Documented On   
1 4:06PM ; [facility]  
  
  
  
                                                    Diabetes Risk Test Score was  
 one score   
2021                               Medical New Patient with Doreen Cabrera CNP                              2021  
  
  
  
                                                    Last Documented On   
1 4:06PM ; [facility]  
  
  
  
                                Hypertension    Medical New Patient with Doreen DAVID esposito CNP 2021  
  
  
  
                                                    Last Documented On   
1 4:06PM ; [facility]  
  
  
  
                                Morbid obesity  Medical New Patient with Doreen DAVID almonteen CNP 2021  
  
  
  
                                                    Last Documented On   
1 4:06PM ; [facility]  
  
  
  
                                Post-traumatic stress disorder Medical New Patie  
nt with Doreenpaola Cabrera CNP   
2021  
  
  
  
                                                    Last Documented On   
1 4:06PM ; [facility]  
  
  
  
                                                    Z68.42 - Body mass index [BM  
I]   
45.0-49.9, adult                        Medical New Patient with Doreenpaola Cabrera CNP                              2021  
  
  
  
                                                    Last Documented On   
1 4:06PM ; [facility]  
Work Phone: [phone]- Progress note*   
  
Progress note  
  
  
  
                                Date            Encounter       Last Documented   
by  
   
                                10/07/2024      Chart Update    Last documented   
on 10/09/2024; 2:06 PM, Doreen Cabrera CNP;   
[facility]  
  
  
  
                                                      
  
  
** Active Problems & Conditions **  
- F90.9 - Attention-deficit Hyperactivity Disorder  
- F31.31 - Bipolar I Disorder, Most Recent Episode, Depressed Mild  
- E11.9 - Diabetes Mellitus Type 2 Without Complication  
- N94.6 - Dysmenorrhea  
- F43.10 - Post-traumatic Stress Disorder  
  
** Current Medication **  
- Alcohol Pads 70% use to cleanse skin prior to checking blood sugars, 90 days, 
3   
refills  
- ARIPiprazole 5 MG Oral Tablet take 1 tablet by mouth once daily, 30 days, 5 
refills  
- Atorvastatin Calcium 20 MG Oral Tablet take 1 tablet by mouth once daily at   
bedtime, 30 days, 5 refills  
- Blood Glucose System Sriram Kit use to check sugars once daily (use what is 
covered),   
30 days, 0 refills  
- busPIRone HCl 10 MG Oral Tablet take 1 tablet by mouth twice daily (d/c 5 mg 
rx),   
30 days, 5 refills  
- CareSens Lancets Miscellaneous use to check sugars once daily (dispense what 
is   
covered), 90 days, 3 refills  
- Colace 100 MG Oral Capsule take 1 capsule by mouth once daily at bedtime as 
needed   
for constipation, 30 days, 5 refills  
- [employer] Iron 325 (65 Fe) MG Oral Tablet take 1 tablet by mouth once daily, 30 
days, 5   
refills  
- Intuniv 1 MG Oral Tablet Extended Release 24 Hour take 1 tablet by mouth once 
daily   
at bedtime, 30 days, 5 refills  
- Januvia 50 MG Oral Tablet take 1 tablet by mouth once daily, 30 days, 5 
refills  
- lamoTRIgine 100 MG Oral Tablet take 1 tablet by mouth once daily, 30 days, 5   
refills  
- Lisinopril 5 MG Oral Tablet take 1 tablet by mouth once daily, 30 days, 5 
refills  
- MiraLax 17 GM/SCOOP Oral Powder mix 1 scoop with 8 ounces once daily, 30 days,
 2   
refills  
- Narcan 4 MG/0.1ML Nasal Liquid spray in nostril for symptoms of overdose , may
   
repeat in 2 minutes if symptoms persist, 1 days, 0 refills  
- OneTouch Ultra In Vitro Strip USE ONE TEST STRIP TO CHECK BLOOD SUGARS ONCE 
DAILY,   
90 days, 3 refills  
- Prazosin HCl 1 MG Oral Capsule take 1 capsule by mouth twice daily, 30 days, 5
   
refills  
- SEROquel 50 MG Oral Tablet take 1 tablet by mouth once daily at bedtime (d/c   
trazodone), 30 days, 1 refills  
- Sertraline HCl 50 MG Oral Tablet take 1 tablet by mouth once daily, 30 days, 5
   
refills  
- Slynd 4 MG Oral Tablet take 1 tablet by mouth at the same time everyday to 
help   
regulate your period, 28 days, 2 refills  
  
** Past Medical/Surgical History **  
Reported:  
No Safety Measures. Has sex without a condom.  
Medical: No previous hospitalizations. Chronic illness and Sexually transmitted   
infection Partners sexually transmitted infection status known.  
Immunization History: Recent immunization for flu.  
Exposure: Exposure to COVID-19.  
Pregnancy: Previously pregnant 1 time(s) and para having 0 live birth(s). Not   
planning a pregnancy in the next year.  
Legal Documents: Consent form on file for procedure.  
Diagnoses:  
Polycystic Ovarian Syndrome (PCOS).  
Migraine headache.  
Psychiatric disorders biopolar disorder  
Anxiety disorder  
POTS.  
Procedural:  
- Insertion of ear pressure equalization tubes in both ears  
Surgical:  
- Tonsillectomy  
- Tonsillectomy with adenoidectomy  
  
** Allergies **  
- Abilify Reaction: groggy  
- Augmentin Reaction: Shock  
- Metformin Reaction: Nausea, Vomiting  
- NO KNOWN ENVIRONMENTAL ALLERGIES  
- NO KNOWN FOOD ALLERGIES  
- Sertraline Reaction: slow/groggy  
- Trazodone Hydrochloride Reaction: Panic attack  
  
** Family History **  
Paternal:  
Systemic hypertension  
Oncologic disorder  
Maternal:  
Systemic hypertension  
Epilepsy and recurrent seizures  
Psychiatric disorders  
Oncologic disorder  
Fraternal:  
Psychiatric disorders  
  
** Assessment **  
- D50.9 - Iron deficiency anemia, unspecified  
  
** Plan **  
StartCited- Iron deficiency anemia, unspecified  
Lab: Iron and TIBC  
Lab: CBC With Differential/Platelet  
EndCited  
** Care Team **  
- Doreen Cabrera CNP  
  
  
[facility]- Progress note*   
  
Progress note  
  
  
  
                                Date            Encounter       Last Documented   
by  
   
                                10/01/2024      Chart Update    Last documented   
on 10/07/2024; 12:06 PM, Doreen Cabrera CNP;   
[facility]  
  
  
  
                                                      
  
  
** Active Problems & Conditions **  
- F90.9 - Attention-deficit Hyperactivity Disorder  
- F31.31 - Bipolar I Disorder, Most Recent Episode, Depressed Mild  
- E11.9 - Diabetes Mellitus Type 2 Without Complication  
- N94.6 - Dysmenorrhea  
- F43.10 - Post-traumatic Stress Disorder  
  
** Current Medication **  
- Alcohol Pads 70% use to cleanse skin prior to checking blood sugars, 90 days, 
3   
refills  
- ARIPiprazole 5 MG Oral Tablet take 1 tablet by mouth once daily, 30 days, 5 
refills  
- Atorvastatin Calcium 20 MG Oral Tablet take 1 tablet by mouth once daily at   
bedtime, 30 days, 5 refills  
- Blood Glucose System Sriram Kit use to check sugars once daily (use what is 
covered),   
30 days, 0 refills  
- busPIRone HCl 10 MG Oral Tablet take 1 tablet by mouth twice daily (d/c 5 mg 
rx),   
30 days, 5 refills  
- CareSens Lancets Miscellaneous use to check sugars once daily (dispense what 
is   
covered), 90 days, 3 refills  
- Colace 100 MG Oral Capsule take 1 capsule by mouth once daily at bedtime as 
needed   
for constipation, 30 days, 5 refills  
- [employer] Iron 325 (65 Fe) MG Oral Tablet take 1 tablet by mouth once daily, 30 
days, 5   
refills  
- Intuniv 1 MG Oral Tablet Extended Release 24 Hour take 1 tablet by mouth once 
daily   
at bedtime, 30 days, 5 refills  
- Januvia 50 MG Oral Tablet take 1 tablet by mouth once daily, 30 days, 5 
refills  
- lamoTRIgine 100 MG Oral Tablet take 1 tablet by mouth once daily, 30 days, 5   
refills  
- Lisinopril 5 MG Oral Tablet take 1 tablet by mouth once daily, 30 days, 5 
refills  
- MiraLax 17 GM/SCOOP Oral Powder mix 1 scoop with 8 ounces once daily, 30 days,
 2   
refills  
- Narcan 4 MG/0.1ML Nasal Liquid spray in nostril for symptoms of overdose , may
   
repeat in 2 minutes if symptoms persist, 1 days, 0 refills  
- OneTouch Ultra In Vitro Strip USE ONE TEST STRIP TO CHECK BLOOD SUGARS ONCE 
DAILY,   
90 days, 3 refills  
- Prazosin HCl 1 MG Oral Capsule take 1 capsule by mouth twice daily, 30 days, 5
   
refills  
- SEROquel 50 MG Oral Tablet take 1 tablet by mouth once daily at bedtime (d/c   
trazodone), 30 days, 1 refills  
- Sertraline HCl 50 MG Oral Tablet take 1 tablet by mouth once daily, 30 days, 5
   
refills  
- Slynd 4 MG Oral Tablet take 1 tablet by mouth at the same time everyday to 
help   
regulate your period, 28 days, 2 refills  
  
** Past Medical/Surgical History **  
Reported:  
No Safety Measures. Has sex without a condom.  
Medical: No previous hospitalizations. Chronic illness and Sexually transmitted   
infection Partners sexually transmitted infection status known.  
Immunization History: Recent immunization for flu.  
Exposure: Exposure to COVID-19.  
Pregnancy: Previously pregnant 1 time(s) and para having 0 live birth(s). Not   
planning a pregnancy in the next year.  
Legal Documents: Consent form on file for procedure.  
Diagnoses:  
Polycystic Ovarian Syndrome (PCOS).  
Migraine headache.  
Psychiatric disorders biopolar disorder  
Anxiety disorder  
POTS.  
Procedural:  
- Insertion of ear pressure equalization tubes in both ears  
Surgical:  
- Tonsillectomy  
- Tonsillectomy with adenoidectomy  
  
** Allergies **  
- Abilify Reaction: groggy  
- Augmentin Reaction: Shock  
- Metformin Reaction: Nausea, Vomiting  
- NO KNOWN ENVIRONMENTAL ALLERGIES  
- NO KNOWN FOOD ALLERGIES  
- Sertraline Reaction: slow/groggy  
- Trazodone Hydrochloride Reaction: Panic attack  
  
** Family History **  
Paternal:  
Systemic hypertension  
Oncologic disorder  
Maternal:  
Systemic hypertension  
Epilepsy and recurrent seizures  
Psychiatric disorders  
Oncologic disorder  
Fraternal:  
Psychiatric disorders  
  
** Care Team **  
- Doreen Cabrera CNP  
  
  
[facility]- History general Narrative - Reported  
  
Includes: Medical History in patient's chart  
  
  
  
                                        Description         Last Updated  
   
                                        No Safety Measures  2024  
  
  
  
                                                    Last Documented On   
4 4:15PM ; [facility]  
  
  
  
                                        POTS                2024  
  
  
  
                                                    Last Documented On   
4 6:51PM ; [facility]  
  
  
  
                                        0 previous live birth(s) 2024  
  
  
  
                                                    Last Documented On   
4 7:50PM ; [facility]  
  
  
  
                                        Previously pregnant 1 time(s) 2024  
  
  
  
                                                    Last Documented On   
4 7:50PM ; [facility]  
  
  
  
                                        Recent immunization for flu 2024  
  
  
  
                                                    Last Documented On   
4 7:50PM ; [facility]  
  
  
  
                                        Has sex without a condom 2022  
  
  
  
                                                    Last Documented On   
2 4:04PM ; [facility]  
  
  
  
                                        Not planning to have a baby in the next   
12 months 2022  
  
  
  
                                                    Last Documented On   
2 4:04PM ; [facility]  
  
  
  
                                        Partners sexually transmitted infection   
status known 2022  
  
  
  
                                                    Last Documented On   
2 4:04PM ; [facility]  
  
  
  
                                        No previous hospitalizations 2022  
  
  
  
                                                    Last Documented On   
2 4:04PM ; [facility]  
  
  
  
                                        Chronic illness     2021  
  
  
  
                                                    Last Documented On   
1 7:49AM ; [facility]  
  
  
  
                                        Exposure to COVID-19 2021  
  
  
  
                                                    Last Documented On   
1 12:31PM ; [facility]  
  
  
  
                                        History of gynecologic disorder 20  
21  
  
  
  
                                                    Last Documented On   
1 4:06PM ; [facility]  
  
  
  
                                        History of Polycystic Ovarian Syndrome (  
PCOS) 2021  
  
  
  
                                                    Last Documented On   
1 4:06PM ; [facility]  
  
  
  
                                        History of anxiety disorder NOS 20  
21  
  
  
  
                                                    Last Documented On   
1 4:06PM ; [facility]  
  
  
  
                                        History of migraine headache 2021  
  
  
  
                                                    Last Documented On   
1 4:06PM ; [facility]  
  
  
  
                                        History of psychiatric disorders biopola  
r disorder 2021  
  
  
  
                                                    Last Documented On   
1 4:06PM ; [facility]  
Work Phone: [phone]- History general Narrative - Reported  
  
Includes: Medical History in patient's chart  
  
  
  
                                        Description         Last Updated  
   
                                        No Safety Measures  2024  
  
  
  
                                                    Last Documented On   
4 4:15PM ; [facility]  
  
  
  
                                        Consent form on file for procedure   
  
  
  
                                                    Last Documented On 10/04/202  
4 1:56PM ; [facility]  
  
  
  
                                        POTS                2024  
  
  
  
                                                    Last Documented On   
4 6:51PM ; [facility]  
  
  
  
                                        0 previous live birth(s) 2024  
  
  
  
                                                    Last Documented On   
4 7:50PM ; [facility]  
  
  
  
                                        Previously pregnant 1 time(s) 2024  
  
  
  
                                                    Last Documented On   
4 7:50PM ; [facility]  
  
  
  
                                        Recent immunization for flu 2024  
  
  
  
                                                    Last Documented On   
4 7:50PM ; [facility]  
  
  
  
                                        Has sex without a condom 2022  
  
  
  
                                                    Last Documented On   
2 4:04PM ; [facility]  
  
  
  
                                        Not planning to have a baby in the next   
12 months 2022  
  
  
  
                                                    Last Documented On   
2 4:04PM ; [facility]  
  
  
  
                                        Partners sexually transmitted infection   
status known 2022  
  
  
  
                                                    Last Documented On   
2 4:04PM ; [facility]  
  
  
  
                                        No previous hospitalizations 2022  
  
  
  
                                                    Last Documented On   
2 4:04PM ; [facility]  
  
  
  
                                        Chronic illness     2021  
  
  
  
                                                    Last Documented On   
1 7:49AM ; [facility]  
  
  
  
                                        Exposure to COVID-19 2021  
  
  
  
                                                    Last Documented On   
1 12:31PM ; [facility]  
  
  
  
                                        History of gynecologic disorder 20  
21  
  
  
  
                                                    Last Documented On   
1 4:06PM ; [facility]  
  
  
  
                                        History of Polycystic Ovarian Syndrome (  
PCOS) 2021  
  
  
  
                                                    Last Documented On   
1 4:06PM ; [facility]  
  
  
  
                                        History of anxiety disorder NOS 20  
21  
  
  
  
                                                    Last Documented On   
1 4:06PM ; [facility]  
  
  
  
                                        History of migraine headache 2021  
  
  
  
                                                    Last Documented On   
1 4:06PM ; [facility]  
  
  
  
                                        History of psychiatric disorders biopola  
r disorder 2021  
  
  
  
                                                    Last Documented On   
1 4:06PM ; [facility]  
Work Phone: [phone]- History general Narrative - Reported  
  
Includes: Medical History in patient's chart  
  
  
  
                                        Description         Last Updated  
   
                                        No Safety Measures  2024  
  
  
  
                                                    Last Documented On   
4 4:15PM ; [facility]  
  
  
  
                                        Consent form on file for procedure   
  
  
  
                                                    Last Documented On 10/04/202  
4 1:56PM ; [facility]  
  
  
  
                                        POTS                2024  
  
  
  
                                                    Last Documented On   
4 6:51PM ; [facility]  
  
  
  
                                        0 previous live birth(s) 2024  
  
  
  
                                                    Last Documented On   
4 7:50PM ; [facility]  
  
  
  
                                        Previously pregnant 1 time(s) 2024  
  
  
  
                                                    Last Documented On   
4 7:50PM ; [facility]  
  
  
  
                                        Recent immunization for flu 2024  
  
  
  
                                                    Last Documented On   
4 7:50PM ; [facility]  
  
  
  
                                        Has sex without a condom 2022  
  
  
  
                                                    Last Documented On   
2 4:04PM ; [facility]  
  
  
  
                                        Not planning to have a baby in the next   
12 months 2022  
  
  
  
                                                    Last Documented On   
2 4:04PM ; [facility]  
  
  
  
                                        Partners sexually transmitted infection   
status known 2022  
  
  
  
                                                    Last Documented On   
2 4:04PM ; [facility]  
  
  
  
                                        No previous hospitalizations 2022  
  
  
  
                                                    Last Documented On   
2 4:04PM ; [facility]  
  
  
  
                                        Chronic illness     2021  
  
  
  
                                                    Last Documented On   
1 7:49AM ; [facility]  
  
  
  
                                        Exposure to COVID-19 2021  
  
  
  
                                                    Last Documented On   
1 12:31PM ; [facility]  
  
  
  
                                        History of gynecologic disorder 20  
21  
  
  
  
                                                    Last Documented On   
1 4:06PM ; [facility]  
  
  
  
                                        History of Polycystic Ovarian Syndrome (  
PCOS) 2021  
  
  
  
                                                    Last Documented On   
1 4:06PM ; [facility]  
  
  
  
                                        History of anxiety disorder NOS 20  
21  
  
  
  
                                                    Last Documented On   
1 4:06PM ; [facility]  
  
  
  
                                        History of migraine headache 2021  
  
  
  
                                                    Last Documented On   
1 4:06PM ; [facility]  
  
  
  
                                        History of psychiatric disorders biopola  
r disorder 2021  
  
  
  
                                                    Last Documented On   
1 4:06PM ; [facility]  
Work Phone: [phone]- History general Narrative - Reported  
  
Includes: Medical History in patient's chart  
  
  
  
                                        Description         Last Updated  
   
                                        No Safety Measures  2024  
  
  
  
                                                    Last Documented On   
4 4:15PM ; [facility]  
  
  
  
                                        Consent form on file for procedure   
  
  
  
                                                    Last Documented On 10/09/202  
4 2:09PM ; [facility]  
  
  
  
                                        POTS                2024  
  
  
  
                                                    Last Documented On   
4 6:51PM ; [facility]  
  
  
  
                                        0 previous live birth(s) 2024  
  
  
  
                                                    Last Documented On   
4 7:50PM ; [facility]  
  
  
  
                                        Previously pregnant 1 time(s) 2024  
  
  
  
                                                    Last Documented On   
4 7:50PM ; [facility]  
  
  
  
                                        Recent immunization for flu 2024  
  
  
  
                                                    Last Documented On   
4 7:50PM ; [facility]  
  
  
  
                                        Has sex without a condom 2022  
  
  
  
                                                    Last Documented On   
2 4:04PM ; [facility]  
  
  
  
                                        Not planning to have a baby in the next   
12 months 2022  
  
  
  
                                                    Last Documented On   
2 4:04PM ; [facility]  
  
  
  
                                        Partners sexually transmitted infection   
status known 2022  
  
  
  
                                                    Last Documented On   
2 4:04PM ; [facility]  
  
  
  
                                        No previous hospitalizations 2022  
  
  
  
                                                    Last Documented On   
2 4:04PM ; [facility]  
  
  
  
                                        Chronic illness     2021  
  
  
  
                                                    Last Documented On   
1 7:49AM ; [facility]  
  
  
  
                                        Exposure to COVID-19 2021  
  
  
  
                                                    Last Documented On   
1 12:31PM ; [facility]  
  
  
  
                                        History of gynecologic disorder 20  
21  
  
  
  
                                                    Last Documented On   
1 4:06PM ; [facility]  
  
  
  
                                        History of Polycystic Ovarian Syndrome (  
PCOS) 2021  
  
  
  
                                                    Last Documented On   
1 4:06PM ; [facility]  
  
  
  
                                        History of anxiety disorder NOS 20  
21  
  
  
  
                                                    Last Documented On   
1 4:06PM ; [facility]  
  
  
  
                                        History of migraine headache 2021  
  
  
  
                                                    Last Documented On   
1 4:06PM ; [facility]  
  
  
  
                                        History of psychiatric disorders biopola  
r disorder 2021  
  
  
  
                                                    Last Documented On   
1 4:06PM ; [facility]  
Work Phone: [phone]- History general Narrative - Reported  
  
Includes: Medical History in patient's chart  
  
  
  
                                        Description         Last Updated  
   
                                        No Safety Measures  2024  
  
  
  
                                                    Last Documented On   
4 4:15PM ; [facility]  
  
  
  
                                        Consent form on file for procedure 09/26  
/2024  
  
  
  
                                                    Last Documented On 10/04/202  
4 1:56PM ; [facility]  
  
  
  
                                        POTS                2024  
  
  
  
                                                    Last Documented On   
4 6:51PM ; [facility]  
  
  
  
                                        0 previous live birth(s) 2024  
  
  
  
                                                    Last Documented On   
4 7:50PM ; [facility]  
  
  
  
                                        Previously pregnant 1 time(s) 2024  
  
  
  
                                                    Last Documented On   
4 7:50PM ; [facility]  
  
  
  
                                        Recent immunization for flu 2024  
  
  
  
                                                    Last Documented On   
4 7:50PM ; [facility]  
  
  
  
                                        Has sex without a condom 2022  
  
  
  
                                                    Last Documented On   
2 4:04PM ; [facility]  
  
  
  
                                        Not planning to have a baby in the next   
12 months 2022  
  
  
  
                                                    Last Documented On   
2 4:04PM ; [facility]  
  
  
  
                                        Partners sexually transmitted infection   
status known 2022  
  
  
  
                                                    Last Documented On   
2 4:04PM ; [facility]  
  
  
  
                                        No previous hospitalizations 2022  
  
  
  
                                                    Last Documented On   
2 4:04PM ; [facility]  
  
  
  
                                        Chronic illness     2021  
  
  
  
                                                    Last Documented On   
1 7:49AM ; [facility]  
  
  
  
                                        Exposure to COVID-19 2021  
  
  
  
                                                    Last Documented On   
1 12:31PM ; [facility]  
  
  
  
                                        History of gynecologic disorder 20  
21  
  
  
  
                                                    Last Documented On   
1 4:06PM ; [facility]  
  
  
  
                                        History of Polycystic Ovarian Syndrome (  
PCOS) 2021  
  
  
  
                                                    Last Documented On   
1 4:06PM ; [facility]  
  
  
  
                                        History of anxiety disorder NOS 20  
21  
  
  
  
                                                    Last Documented On   
1 4:06PM ; [facility]  
  
  
  
                                        History of migraine headache 2021  
  
  
  
                                                    Last Documented On   
1 4:06PM ; [facility]  
  
  
  
                                        History of psychiatric disorders biopola  
r disorder 2021  
  
  
  
                                                    Last Documented On   
1 4:06PM ; [facility]  
Work Phone: [phone]- Evaluation note  
  
Includes: Assessments for all patient encounters  
  
  
  
                                Findings        Encounter       Date  
   
                                                    Attention-deficit hyperactiv  
ity   
disorder                                BH Established Patient with Radha Young LSW                               2024  
  
  
  
                                                    Last Documented On   
4 4:15PM ; [facility]  
  
  
  
                                                    Bipolar I disorder, most rec  
ent   
episode, depressed - mild               BH Established Patient with Radhaleslie Young LSW                               2024  
  
  
  
                                                    Last Documented On   
4 4:15PM ; [facility]  
  
  
  
                                Nicotine dependence  Established Patient with   
Radha Young LSW 2024  
  
  
  
                                                    Last Documented On   
4 4:15PM ; [facility]  
  
  
  
                                        Post-traumatic stress disorder  Establ  
ished Patient with Radha Young   
LSW                                     2024  
  
  
  
                                                    Last Documented On   
4 4:15PM ; [facility]  
  
  
  
                                                    [Z68.43 - Body mass index [B  
MI]   
50.0-59.9, adult] assessment of body   
mass index                              Medical Established Patient with   
Doreenpaola Mccormacken CNP                        2024  
  
  
  
                                                    Last Documented On   
4 5:46PM ; [facility]  
  
  
  
                                                    Bipolar I disorder, most rec  
ent   
episode, depressed - mild               Medical Established Patient with Doreen   
Deborah CNP                              2024  
  
  
  
                                                    Last Documented On   
4 5:46PM ; [facility]  
  
  
  
                                Caries          Medical Established Patient with  
 Doreen Deborah CNP 2024  
  
  
  
                                                    Last Documented On   
4 5:46PM ; [facility]  
  
  
  
                                        Encounter for Immunization Medical Estab  
lished Patient with Doreen Deborah   
CNP                                     2024  
  
  
  
                                                    Last Documented On   
4 5:46PM ; [facility]  
  
  
  
                                                    Type 2 diabetes mellitus wit  
hout   
complication                            Medical Established Patient with   
Doreen Deborah CNP                        2024  
  
  
  
                                                    Last Documented On   
4 5:46PM ; [facility]  
  
  
  
                                        Attention-deficit hyperactivity disorder  
  Established Patient with   
Leonie Short LISWS                     2024  
  
  
  
                                                    Last Documented On   
4 3:28PM ; [facility]  
  
  
  
                                                    Bipolar I disorder, most rec  
ent   
episode, depressed - mild                Established Patient with Leonie   
Short LISWS                             2024  
  
  
  
                                                    Last Documented On   
4 3:28PM ; [facility]  
  
  
  
                                        Post-traumatic stress disorder  Establ  
ished Patient with Leonie Short   
LISWS                                   2024  
  
  
  
                                                    Last Documented On   
4 3:28PM ; [facility]  
  
  
  
                                                    [E11.9 - Type 2 diabetes lobito  
litus   
without complications] type 2 diabetes   
mellitus                                Medical Established Patient with   
Doreen Cabrera CNP                        2024  
  
  
  
                                                    Last Documented On   
4 7:36PM ; [facility]  
  
  
  
                                                    [F41.1 - Generalized anxiety  
   
disorder] generalized anxiety   
disorder                                Medical Established Patient with   
Doreen Cabrera CNP                        2024  
  
  
  
                                                    Last Documented On   
4 7:36PM ; [facility]  
  
  
  
                                                    [I10 - Essential (primary)   
hypertension] essential hypertension    Medical Established Patient with   
Doreen Cabrera CNP                        2024  
  
  
  
                                                    Last Documented On   
4 7:36PM ; [facility]  
  
  
  
                                                    [N94.6 - Dysmenorrhea, unspe  
cified]   
dysmenorrhea                            Medical Established Patient with   
Doreen Cabrera CNP                        2024  
  
  
  
                                                    Last Documented On   
4 7:36PM ; [facility]  
  
  
  
                                                    [Z68.43 - Body mass index [B  
MI]   
50.0-59.9, adult] assessment of body   
mass index                              Medical Established Patient with   
Doreen Cabrera CNP                        2024  
  
  
  
                                                    Last Documented On   
4 7:36PM ; [facility]  
  
  
  
                                        Encounter for Immunization Medical Estab  
lished Patient with Doreen Cabrera   
CNP                                     2024  
  
  
  
                                                    Last Documented On   
4 7:36PM ; [facility]  
  
  
  
                                        Venipuncture was performed Medical Estab  
lished Patient with Doreen Cabrera   
CNP                                     2024  
  
  
  
                                                    Last Documented On   
4 7:36PM ; [facility]  
  
  
  
                                        Attention-deficit hyperactivity disorder  
  Established Patient with   
Leonie Short LISWS                     2024  
  
  
  
                                                    Last Documented On   
4 10:10AM ; [facility]  
  
  
  
                                                    Bipolar I disorder, most rec  
ent   
episode, depressed - mild               BH Established Patient with Leonie   
Short LISWS                             2024  
  
  
  
                                                    Last Documented On   
4 10:10AM ; [facility]  
  
  
  
                                        Post-traumatic stress disorder  Establ  
ished Patient with Leonie Short   
LISWS                                   2024  
  
  
  
                                                    Last Documented On   
4 10:10AM ; [facility]  
  
  
  
                                        [D64.9 - Anemia, unspecified] anemia Med  
ical Established Patient with   
Doreen Cabrera CNP                        2024  
  
  
  
                                                    Last Documented On   
4 6:51PM ; [facility]  
  
  
  
                                                    [Z68.43 - Body mass index [B  
MI]   
50.0-59.9, adult] assessment of body   
mass index                              Medical Established Patient with   
Doreen Cabrera CNP                        2024  
  
  
  
                                                    Last Documented On   
4 6:51PM ; [facility]  
  
  
  
                                                    Bipolar affective disorder,   
current   
episode depressed, mild                  Established Patient with Leonie   
Short LISWS                             2024  
  
  
  
                                                    Last Documented On   
4 5:16PM ; [facility]  
  
  
  
                                        Post-traumatic stress disorder  Establ  
ished Patient with Leonie Short   
LISWS                                   2024  
  
  
  
                                                    Last Documented On   
4 5:16PM ; [facility]  
  
  
  
                                                    Undifferentiated attention d  
eficit   
disorder                                 Established Patient with   
Leonie Short LISWS                     2024  
  
  
  
                                                    Last Documented On   
4 5:16PM ; [facility]  
  
  
  
                                        Visit for: screening for disorder BH Est  
ablished Patient with Leonie   
Short LISWS                             2024  
  
  
  
                                                    Last Documented On   
4 5:16PM ; [facility]  
  
  
  
                                                    [Z68.43 - Body mass index [B  
MI]   
50.0-59.9, adult] assessment of body   
mass index                              Medical Established Patient with   
Doreen Cabrera CNP                        2024  
  
  
  
                                                    Last Documented On   
4 7:50PM ; [facility]  
  
  
  
                                        Attention-deficit hyperactivity disorder  
 Medical Established Patient with   
Doreen Cabrera CNP                        2024  
  
  
  
                                                    Last Documented On   
4 7:50PM ; [facility]  
  
  
  
                                                    Diabetes Risk Test Score was  
 three   
score 3/27/2024                         Medical Established Patient with Doreen Cabrera CNP                              2024  
  
  
  
                                                    Last Documented On   
4 7:50PM ; [facility]  
  
  
  
                                        Visit for: screening for STD Medical Est  
ablished Patient with Doreen Cabrera   
CNP                                     2024  
  
  
  
                                                    Last Documented On   
4 7:50PM ; [facility]  
  
  
  
                                                    [Z68.32 - Body mass index [B  
MI]   
32.0-32.9, adult] assessment of body   
mass index                              Medical Established Patient with   
Doreenpaola Cabrera CNP                        2023  
  
  
  
                                                    Last Documented On   
3 6:01PM ; [facility]  
  
  
  
                                        Borderline personality disorder Medical   
Established Patient with Doreen   
Deborah CNP                              2023  
  
  
  
                                                    Last Documented On   
3 6:01PM ; [facility]  
  
  
  
                                        Screening for diabetes mellitus Medical   
Established Patient with Doreen   
Deborah CNP                              2023  
  
  
  
                                                    Last Documented On   
3 6:01PM ; [facility]  
  
  
  
                                        Assessment of body mass index Medical Es  
tablished Patient with Doreen   
Deborah CNP                              10/21/2022  
  
  
  
                                                    Last Documented On 10/21/202  
2 2:45PM ; [facility]  
  
  
  
                                                    Bipolar affective disorder,   
current   
episode manic                            Tele[facility] with Haylie   
Aguilar LPCC-S                        2022  
  
  
  
                                                    Last Documented On   
2 3:22PM ; [facility]  
  
  
  
                                                    Bipolar affective disorder,   
current   
episode manic                            Established Patient with Haylie   
Aguilar LPCC-S                        2022  
  
  
  
                                                    Last Documented On   
2 2:28PM ; [facility]  
  
  
  
                                                    Bipolar affective disorder,   
current   
episode depressed, severe with   
psychosis                                Telebehavioral Health with Haylie   
Aguilar LPCC-S                        2022  
  
  
  
                                                    Last Documented On   
2 3:29PM ; [facility]  
  
  
  
                                                    Assessment of body mass inde  
x [Body   
mass index [BMI] 50.0-59.9, adult]      Open Access - Established with Julieth   
Aliya CNP                               2022  
  
  
  
                                                    Last Documented On   
2 9:36AM ; [facility]  
  
  
  
                                                    Bipolar I disorder, most rec  
ent   
episode, manic                          Open Access - Established with Julieth   
Aliya CNP                               2022  
  
  
  
                                                    Last Documented On   
2 9:36AM ; [facility]  
  
  
  
                                        Post-traumatic stress disorder BH Establ  
ished Patient with Haylie Aguilar   
LPCC-S                                  2022  
  
  
  
                                                    Last Documented On   
2 3:20PM ; [facility]  
  
  
  
                                No cough        Medical Established Patient with  
 Doreen Deborah CNP 2022  
  
  
  
                                                    Last Documented On   
2 4:04PM ; [facility]  
  
  
  
                                                    Z68.43 - Body mass index [BM  
I]   
50.0-59.9, adult                        Medical Established Patient with   
Doreen Deborah CNP                        2022  
  
  
  
                                                    Last Documented On   
2 4:04PM ; [facility]  
  
  
  
                                                    Borderline personality disor  
danny Pt   
reported hx of sx/dx                     Established Patient with Haylie Aguilar LPCC-S                        2022  
  
  
  
                                                    Last Documented On   
2 4:08PM ; [facility]  
  
  
  
                                                    Assessment of body mass inde  
x [Body   
mass index [BMI] 50.0-59.9, adult]      Open Access - Established with Julieth Pisano CNP                               2022  
  
  
  
                                                    Last Documented On   
2 7:41PM ; [facility]  
  
  
  
                                                    Diabetes Risk Test Score was  
 three   
score 2022                         Open Access - Established with Julieth Pisano CNP                               2022  
  
  
  
                                                    Last Documented On   
2 7:41PM ; [facility]  
  
  
  
                                                    Bipolar I disorder, most rec  
ent   
episode, manic                           Established Patient with Avis   
Felder LPCC-S                          2021  
  
  
  
                                                    Last Documented On   
1 1:35AM ; [facility]  
  
  
  
                                        Borderline personality disorder  Estab  
lished Patient with Avis   
Felder LPCC-S                          2021  
  
  
  
                                                    Last Documented On   
1 1:35AM ; [facility]  
  
  
  
                                        Post-traumatic stress disorder  Establ  
ished Patient with Avis   
Felder LPCC-S                          2021  
  
  
  
                                                    Last Documented On   
1 1:35AM ; [facility]  
  
  
  
                                                    Assessment of visit for: bhargavi guevaraning for   
human immunodeficiency virus            Medical Established Patient with   
Doreen Deborah CNP                        2021  
  
  
  
                                                    Last Documented On   
1 2:56PM ; [facility]  
  
  
  
                                Nicotine dependence Medical Established Patient   
with Doreen Deborah CNP 2021  
  
  
  
                                                    Last Documented On   
1 2:56PM ; [facility]  
  
  
  
                                Tachycardia     Medical Established Patient with  
 Doreen Deborah CNP 2021  
  
  
  
                                                    Last Documented On   
1 2:56PM ; [facility]  
  
  
  
                                                    Z68.43 - Body mass index [BM  
I]   
50.0-59.9, adult                        Medical Established Patient with   
Doreen Deborah CNP                        2021  
  
  
  
                                                    Last Documented On   
1 2:56PM ; [facility]  
  
  
  
                                                    Bipolar I disorder, most rec  
ent   
episode, manic                          BH Established Patient with Leonie   
Short LISWS                             2021  
  
  
  
                                                    Last Documented On   
1 10:17AM ; [facility]  
  
  
  
                                                    Borderline personality disor  
danny per   
patient reported history                 Established Patient with Leonie   
Short LISWS                             2021  
  
  
  
                                                    Last Documented On   
1 10:17AM ; [facility]  
  
  
  
                                Nicotine dependence BH Established Patient with   
Leonie Short LISWS 2021  
  
  
  
                                                    Last Documented On   
1 10:17AM ; [facility]  
  
  
  
                                        Post-traumatic stress disorder  Establ  
ished Patient with Leonie Short   
LISWS                                   2021  
  
  
  
                                                    Last Documented On   
1 10:17AM ; [facility]  
  
  
  
                                Body mass index Medical Established Patient with  
 Doreen Deborah CNP 2021  
  
  
  
                                                    Last Documented On   
1 5:23PM ; [facility]  
  
  
  
                                Morbid obesity  Medical Established Patient with  
 Doreen Deborah CNP 2021  
  
  
  
                                                    Last Documented On   
1 5:23PM ; [facility]  
  
  
  
                                        Nicotine dependence uncomplicated Medica  
l Established Patient with Doreen   
Deborah CNP                              2021  
  
  
  
                                                    Last Documented On   
1 5:23PM ; [facility]  
  
  
  
                                                    Z68.42 - Body mass index [BM  
I]   
45.0-49.9, adult                        Medical Established Patient with   
Doreen Deborah CNP                        2021  
  
  
  
                                                    Last Documented On   
1 5:23PM ; [facility]  
  
  
  
                                Episodic mood disorders On Call with Pamela edwards CNP 2021  
  
  
  
                                                    Last Documented On   
1 7:49AM ; [facility]  
  
  
  
                                                    Mood disorders, NOS per tabatha  
ent   
reported history                         Established Patient with Leonie   
Short LISWS                             2021  
  
  
  
                                                    Last Documented On   
1 7:15PM ; [facility]  
  
  
  
                                        Post-traumatic stress disorder  Establ  
ished Patient with Leonie Short   
LISWS                                   2021  
  
  
  
                                                    Last Documented On   
1 7:15PM ; [facility]  
  
  
  
                                Morbid obesity  Medical Established Patient with  
 Doreen Deborah CNP 2021  
  
  
  
                                                    Last Documented On   
1 12:31PM ; [facility]  
  
  
  
                                Otitis externa  Medical Established Patient with  
 Doreen Deborah CNP 2021  
  
  
  
                                                    Last Documented On   
1 12:31PM ; [facility]  
  
  
  
                                                    Z68.42 - Body mass index [BM  
I]   
45.0-49.9, adult                        Medical Established Patient with   
Doreen Deborah CNP                        2021  
  
  
  
                                                    Last Documented On   
1 12:31PM ; [facility]  
  
  
  
                                        Post-traumatic stress disorder  Establ  
ished Patient with Leonie Short   
LISWS                                   06/10/2021  
  
  
  
                                                    Last Documented On 06/10/202  
1 10:05PM ; [facility]  
  
  
  
                                Morbid obesity  Medical Established Patient with  
 Doreen Deborah CNP 06/10/2021  
  
  
  
                                                    Last Documented On 06/10/202  
1 4:45PM ; [facility]  
  
  
  
                                                    Z68.42 - Body mass index [BM  
I]   
45.0-49.9, adult                        Medical Established Patient with   
Doreen Deborah CNP                        06/10/2021  
  
  
  
                                                    Last Documented On 06/10/202  
1 4:45PM ; [facility]  
  
  
  
                                        Post-traumatic stress disorder  Establ  
ished Patient with Leonie Short   
LISWS                                   2021  
  
  
  
                                                    Last Documented On   
1 11:59AM ; [facility]  
  
  
  
                                                    Assessment of visit for: bhargavi guevaraning for   
human immunodeficiency virus            Medical New Patient with Doreen   
Deborah CNP                              2021  
  
  
  
                                                    Last Documented On   
1 4:06PM ; [facility]  
  
  
  
                                                    Diabetes Risk Test Score was  
 one score   
2021                               Medical New Patient with Doreen   
Deborah CNP                              2021  
  
  
  
                                                    Last Documented On   
1 4:06PM ; [facility]  
  
  
  
                                Hypertension    Medical New Patient with Doreen C  
cate CNP 2021  
  
  
  
                                                    Last Documented On   
1 4:06PM ; [facility]  
  
  
  
                                Morbid obesity  Medical New Patient with Doreen C  
cate CNP 2021  
  
  
  
                                                    Last Documented On   
1 4:06PM ; [facility]  
  
  
  
                                Post-traumatic stress disorder Medical New Patie  
nt with Doreen Deborah CNP   
2021  
  
  
  
                                                    Last Documented On   
1 4:06PM ; [facility]  
  
  
  
                                                    Z68.42 - Body mass index [BM  
I]   
45.0-49.9, adult                        Medical New Patient with Doreen   
Deborah CNP                              2021  
  
  
  
                                                    Last Documented On   
1 4:06PM ; [facility]  
Work Phone: [phone]- Evaluation note  
  
Includes: Assessments for all patient encounters  
  
  
  
                                Findings        Encounter       Date  
   
                                                    Attention-deficit hyperactiv  
ity   
disorder                                BH Established Patient with Radha Young LSW                               2024  
  
  
  
                                                    Last Documented On   
4 4:15PM ; [facility]  
  
  
  
                                                    Bipolar I disorder, most rec  
ent   
episode, depressed - mild               BH Established Patient with Radha Young LSW                               2024  
  
  
  
                                                    Last Documented On   
4 4:15PM ; [facility]  
  
  
  
                                Nicotine dependence  Established Patient with   
Radhaleslie Young W 2024  
  
  
  
                                                    Last Documented On   
4 4:15PM ; [facility]  
  
  
  
                                        Post-traumatic stress disorder  Establ  
ished Patient with Radha Young   
W                                     2024  
  
  
  
                                                    Last Documented On   
4 4:15PM ; [facility]  
  
  
  
                                                    [Z68.43 - Body mass index [B  
MI]   
50.0-59.9, adult] assessment of body   
mass index                              Medical Established Patient with   
Doreen Cabrera CNP                        2024  
  
  
  
                                                    Last Documented On 10/04/202  
4 1:56PM ; [facility]  
  
  
  
                                                    Bipolar I disorder, most rec  
ent   
episode, depressed - mild               Medical Established Patient with Doreenpaola Cabrera CNP                              2024  
  
  
  
                                                    Last Documented On 10/04/202  
4 1:56PM ; [facility]  
  
  
  
                                Caries          Medical Established Patient with  
 Doreen Mccormacken CNP 2024  
  
  
  
                                                    Last Documented On 10/04/202  
4 1:56PM ; [facility]  
  
  
  
                                        Encounter for Immunization Medical Estab  
lished Patient with Doreen Mccormacken   
CNP                                     2024  
  
  
  
                                                    Last Documented On 10/04/202  
4 1:56PM ; [facility]  
  
  
  
                                                    Type 2 diabetes mellitus wit  
hout   
complication                            Medical Established Patient with   
Doeren Deborah CNP                        2024  
  
  
  
                                                    Last Documented On 10/04/202  
4 1:56PM ; [facility]  
  
  
  
                                        Attention-deficit hyperactivity disorder  
  Established Patient with   
Leonie Short LISWS                     2024  
  
  
  
                                                    Last Documented On   
4 3:28PM ; [facility]  
  
  
  
                                                    Bipolar I disorder, most rec  
ent   
episode, depressed - mild               BH Established Patient with Leonie Garrett LISWS                             2024  
  
  
  
                                                    Last Documented On   
4 3:28PM ; [facility]  
  
  
  
                                        Post-traumatic stress disorder BH Establ  
ished Patient with Leonie Short   
LISWS                                   2024  
  
  
  
                                                    Last Documented On   
4 3:28PM ; [facility]  
  
  
  
                                                    [E11.9 - Type 2 diabetes lobito  
litus   
without complications] type 2 diabetes   
mellitus                                Medical Established Patient with   
Doreen Cabrera CNP                        2024  
  
  
  
                                                    Last Documented On   
4 7:36PM ; [facility]  
  
  
  
                                                    [F41.1 - Generalized anxiety  
   
disorder] generalized anxiety   
disorder                                Medical Established Patient with   
Doreen Cabrera CNP                        2024  
  
  
  
                                                    Last Documented On   
4 7:36PM ; [facility]  
  
  
  
                                                    [I10 - Essential (primary)   
hypertension] essential hypertension    Medical Established Patient with   
Doreen Cabrera CNP                        2024  
  
  
  
                                                    Last Documented On   
4 7:36PM ; [facility]  
  
  
  
                                                    [N94.6 - Dysmenorrhea, unspe  
cified]   
dysmenorrhea                            Medical Established Patient with   
Doreen Cabrera CNP                        2024  
  
  
  
                                                    Last Documented On   
4 7:36PM ; [facility]  
  
  
  
                                                    [Z68.43 - Body mass index [B  
MI]   
50.0-59.9, adult] assessment of body   
mass index                              Medical Established Patient with   
Doreen Cabrera CNP                        2024  
  
  
  
                                                    Last Documented On   
4 7:36PM ; [facility]  
  
  
  
                                        Encounter for Immunization Medical Estab  
lished Patient with Doreen Cabrera   
CNP                                     2024  
  
  
  
                                                    Last Documented On   
4 7:36PM ; [facility]  
  
  
  
                                        Venipuncture was performed Medical Estab  
lished Patient with Doreen Cabrera   
CNP                                     2024  
  
  
  
                                                    Last Documented On   
4 7:36PM ; [facility]  
  
  
  
                                        Attention-deficit hyperactivity disorder  
  Established Patient with   
Leonie Short LISWS                     2024  
  
  
  
                                                    Last Documented On   
4 10:10AM ; [facility]  
  
  
  
                                                    Bipolar I disorder, most rec  
ent   
episode, depressed - mild                Established Patient with Leonie   
Short LISWS                             2024  
  
  
  
                                                    Last Documented On   
4 10:10AM ; [facility]  
  
  
  
                                        Post-traumatic stress disorder BH Establ  
ished Patient with Leonie Short   
LISWS                                   2024  
  
  
  
                                                    Last Documented On   
4 10:10AM ; [facility]  
  
  
  
                                        [D64.9 - Anemia, unspecified] anemia Med  
ical Established Patient with   
Doreen Cabrera CNP                        2024  
  
  
  
                                                    Last Documented On   
4 6:51PM ; [facility]  
  
  
  
                                                    [Z68.43 - Body mass index [B  
MI]   
50.0-59.9, adult] assessment of body   
mass index                              Medical Established Patient with   
Doreen Cabrera CNP                        2024  
  
  
  
                                                    Last Documented On   
4 6:51PM ; [facility]  
  
  
  
                                                    Bipolar affective disorder,   
current   
episode depressed, mild                  Established Patient with Leonie   
Short LISWS                             2024  
  
  
  
                                                    Last Documented On   
4 5:16PM ; [facility]  
  
  
  
                                        Post-traumatic stress disorder  Establ  
ished Patient with Leonie Short   
LISWS                                   2024  
  
  
  
                                                    Last Documented On   
4 5:16PM ; [facility]  
  
  
  
                                                    Undifferentiated attention d  
eficit   
disorder                                 Established Patient with   
Leonie Short LISWS                     2024  
  
  
  
                                                    Last Documented On   
4 5:16PM ; [facility]  
  
  
  
                                        Visit for: screening for disorder BH Est  
ablished Patient with Leonie   
Short LISWS                             2024  
  
  
  
                                                    Last Documented On   
4 5:16PM ; [facility]  
  
  
  
                                                    [Z68.43 - Body mass index [B  
MI]   
50.0-59.9, adult] assessment of body   
mass index                              Medical Established Patient with   
Doreen Cabrera CNP                        2024  
  
  
  
                                                    Last Documented On   
4 7:50PM ; [facility]  
  
  
  
                                        Attention-deficit hyperactivity disorder  
 Medical Established Patient with   
Doreen Cabrera CNP                        2024  
  
  
  
                                                    Last Documented On   
4 7:50PM ; [facility]  
  
  
  
                                                    Diabetes Risk Test Score was  
 three   
score 3/27/2024                         Medical Established Patient with Doreen Cabrera CNP                              2024  
  
  
  
                                                    Last Documented On   
4 7:50PM ; [facility]  
  
  
  
                                        Visit for: screening for STD Medical Est  
ablished Patient with Doreen Cabrera   
CNP                                     2024  
  
  
  
                                                    Last Documented On   
4 7:50PM ; [facility]  
  
  
  
                                                    [Z68.32 - Body mass index [B  
MI]   
32.0-32.9, adult] assessment of body   
mass index                              Medical Established Patient with   
Doreen Cabrera CNP                        2023  
  
  
  
                                                    Last Documented On   
3 6:01PM ; [facility]  
  
  
  
                                        Borderline personality disorder Medical   
Established Patient with Doreen Cabrera CNP                              2023  
  
  
  
                                                    Last Documented On   
3 6:01PM ; [facility]  
  
  
  
                                        Screening for diabetes mellitus Medical   
Established Patient with Doreen Cabrera CNP                              2023  
  
  
  
                                                    Last Documented On   
3 6:01PM ; [facility]  
  
  
  
                                        Assessment of body mass index Medical Es  
tablished Patient with Doreen Cabrera CNP                              10/21/2022  
  
  
  
                                                    Last Documented On 10/21/202  
2 2:45PM ; [facility]  
  
  
  
                                                    Bipolar affective disorder,   
current   
episode manic                            [facility] with Haylienicanor Martinerson LPCC-S                        2022  
  
  
  
                                                    Last Documented On   
2 3:22PM ; [facility]  
  
  
  
                                                    Bipolar affective disorder,   
current   
episode manic                            Established Patient with Haylie   
Aguilar LPCC-S                        2022  
  
  
  
                                                    Last Documented On   
2 2:28PM ; [facility]  
  
  
  
                                                    Bipolar affective disorder,   
current   
episode depressed, severe with   
psychosis                                [facility] with Haylie   
Aguilar LPCC-S                        2022  
  
  
  
                                                    Last Documented On   
2 3:29PM ; [facility]  
  
  
  
                                                    Assessment of body mass inde  
x [Body   
mass index [BMI] 50.0-59.9, adult]      Open Access - Established with Julieth   
Aliya CNP                               2022  
  
  
  
                                                    Last Documented On   
2 9:36AM ; [facility]  
  
  
  
                                                    Bipolar I disorder, most rec  
ent   
episode, manic                          Open Access - Established with Julieth   
Aliya CNP                               2022  
  
  
  
                                                    Last Documented On   
2 9:36AM ; [facility]  
  
  
  
                                        Post-traumatic stress disorder BH Establ  
ished Patient with Haylie Aguilar   
LPCC-S                                  2022  
  
  
  
                                                    Last Documented On   
2 3:20PM ; [facility]  
  
  
  
                                No cough        Medical Established Patient with  
 Doreen Deborah CNP 2022  
  
  
  
                                                    Last Documented On   
2 4:04PM ; [facility]  
  
  
  
                                                    Z68.43 - Body mass index [BM  
I]   
50.0-59.9, adult                        Medical Established Patient with   
Doreen Deborah CNP                        2022  
  
  
  
                                                    Last Documented On   
2 4:04PM ; [facility]  
  
  
  
                                                    Borderline personality disor  
danny Pt   
reported hx of sx/dx                     Established Patient with Haylie Aguilar LPCC-S                        2022  
  
  
  
                                                    Last Documented On   
2 4:08PM ; [facility]  
  
  
  
                                                    Assessment of body mass inde  
x [Body   
mass index [BMI] 50.0-59.9, adult]      Open Access - Established with Julieth Pisano CNP                               2022  
  
  
  
                                                    Last Documented On   
2 7:41PM ; [facility]  
  
  
  
                                                    Diabetes Risk Test Score was  
 three   
score 2022                         Open Access - Established with Julieth   
Aliya CNP                               2022  
  
  
  
                                                    Last Documented On   
2 7:41PM ; [facility]  
  
  
  
                                                    Bipolar I disorder, most rec  
ent   
episode, manic                           Established Patient with Avis   
Felder LPCC-S                          2021  
  
  
  
                                                    Last Documented On   
1 1:35AM ; [facility]  
  
  
  
                                        Borderline personality disorder  Estab  
lished Patient with Avis   
Felder LPCC-S                          2021  
  
  
  
                                                    Last Documented On   
1 1:35AM ; [facility]  
  
  
  
                                        Post-traumatic stress disorder  Establ  
ished Patient with Avis   
Felder LPCC-S                          2021  
  
  
  
                                                    Last Documented On   
1 1:35AM ; [facility]  
  
  
  
                                                    Assessment of visit for: bhargavi guevaraning for   
human immunodeficiency virus            Medical Established Patient with   
Doreen Deborah CNP                        2021  
  
  
  
                                                    Last Documented On   
1 2:56PM ; [facility]  
  
  
  
                                Nicotine dependence Medical Established Patient   
with Doreen Deborah CNP 2021  
  
  
  
                                                    Last Documented On   
1 2:56PM ; [facility]  
  
  
  
                                Tachycardia     Medical Established Patient with  
 Doreen Deborah CNP 2021  
  
  
  
                                                    Last Documented On   
1 2:56PM ; [facility]  
  
  
  
                                                    Z68.43 - Body mass index [BM  
I]   
50.0-59.9, adult                        Medical Established Patient with   
Doreen Deborah CNP                        2021  
  
  
  
                                                    Last Documented On   
1 2:56PM ; [facility]  
  
  
  
                                                    Bipolar I disorder, most rec  
ent   
episode, manic                           Established Patient with Leonie   
Short LISWS                             2021  
  
  
  
                                                    Last Documented On   
1 10:17AM ; [facility]  
  
  
  
                                                    Borderline personality disor  
danny per   
patient reported history                 Established Patient with Leonie   
Short LISWS                             2021  
  
  
  
                                                    Last Documented On   
1 10:17AM ; [facility]  
  
  
  
                                Nicotine dependence BH Established Patient with   
Leonie Short LISWS 2021  
  
  
  
                                                    Last Documented On   
1 10:17AM ; [facility]  
  
  
  
                                        Post-traumatic stress disorder  Establ  
ished Patient with Leonie Short   
LISWS                                   2021  
  
  
  
                                                    Last Documented On   
1 10:17AM ; [facility]  
  
  
  
                                Body mass index Medical Established Patient with  
 Doreen Deborah CNP 2021  
  
  
  
                                                    Last Documented On   
1 5:23PM ; [facility]  
  
  
  
                                Morbid obesity  Medical Established Patient with  
 Doreen Deborah CNP 2021  
  
  
  
                                                    Last Documented On   
1 5:23PM ; [facility]  
  
  
  
                                        Nicotine dependence uncomplicated Medica  
l Established Patient with Doreen   
Deborah CNP                              2021  
  
  
  
                                                    Last Documented On   
1 5:23PM ; [facility]  
  
  
  
                                                    Z68.42 - Body mass index [BM  
I]   
45.0-49.9, adult                        Medical Established Patient with   
Doreen Deborah CNP                        2021  
  
  
  
                                                    Last Documented On   
1 5:23PM ; [facility]  
  
  
  
                                Episodic mood disorders On Call with Pamela edwards CNP 2021  
  
  
  
                                                    Last Documented On   
1 7:49AM ; [facility]  
  
  
  
                                                    Mood disorders, NOS per tabatha  
ent   
reported history                         Established Patient with Leonie   
Short LISWS                             2021  
  
  
  
                                                    Last Documented On   
1 7:15PM ; [facility]  
  
  
  
                                        Post-traumatic stress disorder  Establ  
ished Patient with Leonie Short   
LISWS                                   2021  
  
  
  
                                                    Last Documented On   
1 7:15PM ; [facility]  
  
  
  
                                Morbid obesity  Medical Established Patient with  
 Doreen Deborah CNP 2021  
  
  
  
                                                    Last Documented On   
1 12:31PM ; [facility]  
  
  
  
                                Otitis externa  Medical Established Patient with  
 Doreen Deborah CNP 2021  
  
  
  
                                                    Last Documented On   
1 12:31PM ; [facility]  
  
  
  
                                                    Z68.42 - Body mass index [BM  
I]   
45.0-49.9, adult                        Medical Established Patient with   
Doreen Deborah CNP                        2021  
  
  
  
                                                    Last Documented On   
1 12:31PM ; [facility]  
  
  
  
                                        Post-traumatic stress disorder  Establ  
ished Patient with Leonie Short   
LISWS                                   06/10/2021  
  
  
  
                                                    Last Documented On 06/10/202  
1 10:05PM ; [facility]  
  
  
  
                                Morbid obesity  Medical Established Patient with  
 Doreen Deborah CNP 06/10/2021  
  
  
  
                                                    Last Documented On 06/10/202  
1 4:45PM ; [facility]  
  
  
  
                                                    Z68.42 - Body mass index [BM  
I]   
45.0-49.9, adult                        Medical Established Patient with   
Doreen Deborah CNP                        06/10/2021  
  
  
  
                                                    Last Documented On 06/10/202  
1 4:45PM ; [facility]  
  
  
  
                                        Post-traumatic stress disorder BH Establ  
ished Patient with Leonie Short   
LISWS                                   2021  
  
  
  
                                                    Last Documented On   
1 11:59AM ; [facility]  
  
  
  
                                                    Assessment of visit for: scr  
eening for   
human immunodeficiency virus            Medical New Patient with Doreen   
Deborah CNP                              2021  
  
  
  
                                                    Last Documented On   
1 4:06PM ; [facility]  
  
  
  
                                                    Diabetes Risk Test Score was  
 one score   
2021                               Medical New Patient with Doreen   
Deborah CNP                              2021  
  
  
  
                                                    Last Documented On   
1 4:06PM ; [facility]  
  
  
  
                                Hypertension    Medical New Patient with Doreen C  
cate CNP 2021  
  
  
  
                                                    Last Documented On   
1 4:06PM ; [facility]  
  
  
  
                                Morbid obesity  Medical New Patient with Doreen C  
cate CNP 2021  
  
  
  
                                                    Last Documented On   
1 4:06PM ; [facility]  
  
  
  
                                Post-traumatic stress disorder Medical New Patie  
nt with Doreen Deborah CNP   
2021  
  
  
  
                                                    Last Documented On   
1 4:06PM ; [facility]  
  
  
  
                                                    Z68.42 - Body mass index [BM  
I]   
45.0-49.9, adult                        Medical New Patient with Doreen Cabrera CNP                              2021  
  
  
  
                                                    Last Documented On   
1 4:06PM ; [facility]  
Work Phone: [phone]- Progress note*   
  
Progress note  
  
  
  
                                Date            Encounter       Last Documented   
by  
   
                                2024       Established Patient Last docu  
mented on 2024; 4:15 PM, Radha LEIGHW; [facility]  
  
  
  
                                                      
  
  
** Active Problems & Conditions **  
- F90.9 - Attention-deficit Hyperactivity Disorder  
- F31.31 - Bipolar I Disorder, Most Recent Episode, Depressed Mild  
- E11.9 - Diabetes Mellitus Type 2 Without Complication  
- N94.6 - Dysmenorrhea  
- F43.10 - Post-traumatic Stress Disorder  
  
** Subjective **  
[facility] met with patient for mood and medications.  
PHQ9 score 18 ESTHELA score 21  
Patient reports that her depression and anxiety are not good at the moment.  
She is not sleeping well and does not feel that the trazedone is helping. She 
would   
like to start seroquel.  
Patient would like to go back to counseling and was provided resources.  
Pt is reporting that she is haivng nightmares and they are from her past.  
Discussed EMDR therapy for Pt to address her PTSD.  
Encouraged patient to engage in more physical activity to help with sleep. She   
reports that she just not tired to be able to sleep but feels tired. . Pt would 
like   
to walk around a track but is fearful that she will see her father.  
Pt was present with her mother who agreed to go with her walking.  
Discussed [facility] patient the benefits of seeing a psychiatrist for evaluation for 
ADHD.  
  
** Chief Complaint **  
The Chief Complaint is: Follow up for mood and medications.  
  
** History of Present Illness **  
Linnette Beckham is a 24 year old female.  
- Appetite not normal - Irritability  
- Anxiety - Depression severe - Sleep disturbances Will be starting seroquel - 
Energy   
level is good - Easily distracted  
  
** Current Medication **  
- Alcohol Pads 70% use to cleanse skin prior to checking blood sugars, 90 days, 
3   
refills  
- ARIPiprazole 5 MG Oral Tablet take 1 tablet by mouth once daily, 30 days, 5 
refills  
- Atorvastatin Calcium 20 MG Oral Tablet take 1 tablet by mouth once daily at   
bedtime, 30 days, 5 refills  
- Blood Glucose System Sriram Kit use to check sugars once daily (use what is 
covered),   
30 days, 0 refills  
- busPIRone HCl 10 MG Oral Tablet take 1 tablet by mouth twice daily (d/c 5 mg 
rx),   
30 days, 5 refills  
- CareSens Lancets Miscellaneous use to check sugars once daily (dispense what 
is   
covered), 90 days, 3 refills  
- Colace 100 MG Oral Capsule take 1 capsule by mouth once daily at bedtime as 
needed   
for constipation, 30 days, 5 refills  
- [employer] Iron 325 (65 Fe) MG Oral Tablet take 1 tablet by mouth once daily, 30 
days, 5   
refills  
- Intuniv 1 MG Oral Tablet Extended Release 24 Hour take 1 tablet by mouth once 
daily   
at bedtime, 30 days, 5 refills  
- Intuniv 1 MG Oral Tablet Extended Release 24 Hour take 1 tablet by mouth once 
daily   
at bedtime, 30 days, 5 refills  
- Januvia 50 MG Oral Tablet take 1 tablet by mouth once daily, 30 days, 5 
refills  
- lamoTRIgine 100 MG Oral Tablet take 1 tablet by mouth once daily, 30 days, 5   
refills  
- lamoTRIgine 100 MG Oral Tablet take 1 tablet by mouth once daily, 30 days, 5   
refills  
- Lisinopril 5 MG Oral Tablet take 1 tablet by mouth once daily, 30 days, 5 
refills  
- MiraLax 17 GM/SCOOP Oral Powder mix 1 scoop with 8 ounces once daily, 30 days,
 2   
refills  
- Narcan 4 MG/0.1ML Nasal Liquid spray in nostril for symptoms of overdose , may
   
repeat in 2 minutes if symptoms persist, 1 days, 0 refills  
- OneTouch Ultra In Vitro Strip USE ONE TEST STRIP TO CHECK BLOOD SUGARS ONCE 
DAILY,   
90 days, 3 refills  
- Prazosin HCl 1 MG Oral Capsule take 1 capsule by mouth twice daily, 30 days, 5
   
refills  
- Prazosin HCl 1 MG Oral Capsule take 1 capsule by mouth twice daily, 30 days, 5
   
refills  
- SEROquel 50 MG Oral Tablet take 1 tablet by mouth once daily at bedtime (d/c   
trazodone), 30 days, 1 refills  
- Sertraline HCl 50 MG Oral Tablet take 1 tablet by mouth once daily, 30 days, 5
   
refills  
- Sertraline HCl 50 MG Oral Tablet take 1 tablet by mouth once daily, 30 days, 5
   
refills  
- Slynd 4 MG Oral Tablet take 1 tablet by mouth at the same time everyday to 
help   
regulate your period, 28 days, 2 refills  
  
** Past Medical/Surgical History **  
Reported:  
No Safety Measures. Has sex without a condom.  
Medical: No previous hospitalizations. Chronic illness and Sexually transmitted   
infection Partners sexually transmitted infection status known.  
Immunization History: Recent immunization for flu.  
Exposure: Exposure to COVID-19.  
Pregnancy: Previously pregnant 1 time(s) and para having 0 live birth(s). Not   
planning a pregnancy in the next year.  
Diagnoses:  
Polycystic Ovarian Syndrome (PCOS).  
Migraine headache.  
Psychiatric disorders biopolar disorder  
Anxiety disorder  
POTS.  
Procedural:  
- Insertion of ear pressure equalization tubes in both ears  
Surgical:  
- Tonsillectomy  
- Tonsillectomy with adenoidectomy  
  
** Social History **  
Environmental Exposure: Secondhand cigarette smoke exposure.  
Personal: Recent emotional stress.  
Tobacco use: Using electronic cigarettes/vaping.  
Alcohol: Not using alcohol.  
Drug Use: Not using drugs.  
Housing And Economic Circumstances: Lives with parents.  
Sexual: Sexual orientation Lesbian or David and gender identity Other.  
  
** Allergies **  
- Abilify Reaction: groggy  
- Augmentin Reaction: Shock  
- Metformin Reaction: Nausea, Vomiting  
- NO KNOWN ENVIRONMENTAL ALLERGIES  
- NO KNOWN FOOD ALLERGIES  
- Sertraline Reaction: slow/groggy  
- Trazodone Hydrochloride Reaction: Panic attack  
  
** Family History **  
Paternal:  
Systemic hypertension  
Oncologic disorder  
Maternal:  
Systemic hypertension  
Epilepsy and recurrent seizures  
Psychiatric disorders  
Oncologic disorder  
Fraternal:  
Psychiatric disorders  
  
** Physical Findings **  
General Appearance:  
- Normal Appearance.  
Neurological:  
- Cognitive Functions was Normal. - Oriented to time, place, and person. - No   
hallucinations. - Judgement was not impaired.  
Speech: - Is Normal. - No articulation abnormalities.  
Psychiatric:  
- Mood is Euthymic. - Attitude Open.  
Appearance: - Normal.  
Demonstrated Behavior: - Motor Activity Normal Activity. - Eye Contact 
Appropriate.  
Affect: - Congruent with the mood.  
Thought Processes: - Not impaired.  
Thought Content: - Revealed no impairment. - Insight was intact. - No delusions.
 - No   
suicidal ideation. - No Passive thoughts of Death. - No suicidal plans. - No 
suicidal   
intent. - No homicidal ideations. - No homicidal plans. - No homicidal intent.  
Past Medical:  
- No repetitive self injurious behavior.  
  
** Assessment **  
- Attention-deficit hyperactivity disorder [F90.9 - Attention-deficit 
hyperactivity   
disorder, unspecified type]  
- Nicotine dependence [F17.200 - Nicotine dependence, unspecified, 
uncomplicated]  
- Bipolar I disorder, most recent episode, depressed - mild [F31.31 - Bipolar   
disorder, current episode depressed, mild]  
- Post-traumatic stress disorder [F43.10 - Post-traumatic stress disorder,   
unspecified]  
  
** Therapy **  
- Developmental/Behavioral Screening & Testing - PHQ9 and 
Developmental/Behavioral   
Screening & Testing - GAD7.  
- Brief solution-focused.  
- Adherent with medications.  
-  Visit 30 Minutes.  
- Referral to mental health team.  
- Plan - Begin taking the seroquel at bedtime and Collaborated with patient and   
provider:  
  
** Counseling/Education **  
*BHP offered active and supportive listening, normalized emotions and feelings, 
and   
processed  
current stressors.  
*Discussed engageing in counseling and looking into EMDR to address PTSD and   
increased nightmares.  
  
** Plan **  
*Continue to take medication(s) as prescribed.  
*BHP to follow-up with patient at next visit as scheduled.  
*Patient to follow up as needed/scheduled.  
  
** Care Team **  
- Doreen Cabrera CNP  
  
** Health Reminders **  
- Assess Tobacco Use satisfied 2024.  
- ESTHELA-2 satisfied 2024.  
- PHQ9 / PHQA satisfied 2024.  
  
** User Defined 1 **  
ESTHELA-2 score was six 2024, ESTHELA-7 score was 21 [ESTHELA-7] Feeling nervous, 
anxious or   
on edge? + 3 pt : Nearly every day, [ESTHELA-7] Feeling nervous, anxious or on edge?
+ 3   
pt : Nearly every day, [ESTHELA-7] Not being able to stop or control worrying? + 3 
pt :   
Nearly every day, [ESTHELA-7] Not being able to stop or control worrying? + 3 pt : 
Nearly   
every day, [ESTHELA-7] Worrying too much about different things? + 3 pt : Nearly 
every   
day, [ESTHELA-7] Trouble relaxing? + 3 pt : Nearly every day, [ESTHELA-7] Being so 
restless   
that it's hard to sit still? + 3 pt : Nearly every day, [ESTHELA-7] Becoming easily   
annoyed or irritable? + 3 pt : Nearly every day, [ESTHELA-7] Feeling afraid as if   
something awful might happen? + 3 pt : Nearly every day, Patient Health 
Questionnaire   
9-Item total score was 18 2024 If you checked off problems, how difficult 
is it   
for you to do your work? + : Very difficult, [PHQ-9-1] Little interest or 
pleasure in   
doing things? + 3 pt : Nearly every day, [PHQ-9-2] Feeling down, depressed, or   
hopeless? + 3 pt : Nearly every day, [PHQ-9-3] Trouble falling or staying asleep
 or   
sleeping too much? + 3 pt : Nearly every day, [PHQ-9-4] Feeling tired or having   
little energy? + 3 pt : Nearly every day, [PHQ-9-5] Poor appetite or overeating?
 + 3   
pt : Nearly every day, [PHQ-9-6] Feeling bad about yourself-or that you are a 
failure   
+ 0 pt : Not at all, [PHQ-9-7] Trouble concentrating on things such as reading 
the   
newspaper + 3 pt : Nearly every day, [PHQ-9-8] Moving or speaking so slowly that
   
other people have noticed. + 0 pt : Not at all, [PHQ-9-9] Thoughts that you 
would be   
better off dead or hurting yourself? + 0 pt : Not at all, and ESTHELA If you checked
 off   
problems, how difficult is it for you to do your work, take care of things, or 
get   
along? Very difficult.  
  
  
[facility]- Progress note*   
  
Progress note  
  
  
  
                                Date            Encounter       Last Documented   
by  
   
                                2024      Medical Established Patient Last  
 documented on 10/04/2024; 1:56 PM,   
Doreen Cabrera CNP; [facility]  
  
  
  
                                                      
  
  
** Active Problems & Conditions **  
- F90.9 - Attention-deficit Hyperactivity Disorder  
- F31.31 - Bipolar I Disorder, Most Recent Episode, Depressed Mild  
- E11.9 - Diabetes Mellitus Type 2 Without Complication  
- N94.6 - Dysmenorrhea  
- F43.10 - Post-traumatic Stress Disorder  
  
** Chief Complaint **  
The Chief Complaint is: Patient reports her mental health is a problem. Patient 
isn't   
sure the zoloft is working for her. Patient said she is feeling very down. 
Patient is   
not sleeping, but has not picked up seroquel. Patient has been still taking the   
trazodone.  
  
** Referred Here **  
Not referred by urgent care clinic and not the emergency room. No prior 
encounters.  
- Data to be reviewed: no clinical lab tests  
  
** History of Present Illness **  
Linnette Beckham is a 24 year old female.  
- Allergy list reviewed - Reviewed Medications - Medication list reviewed  
- Date of last menstruation 2024 - Pregnancy test - would not like a 
pregnancy   
test  
Presents for med follow up , did not know seroquel was ready at the pharmacy so 
hasnt   
been taking . we do not have any hpv vaccines here today , needs 2nd dose. is 
going   
thru a vape about every 3 days , wants to quit but aware it would be better to 
wait   
until mental health is better , advised on the harm of nicotine / vapes  
  
** Current Medication **  
- Alcohol Pads 70% use to cleanse skin prior to checking blood sugars, 90 days, 
3   
refills  
- ARIPiprazole 5 MG Oral Tablet take 1 tablet by mouth once daily, 30 days, 5 
refills  
- Atorvastatin Calcium 20 MG Oral Tablet take 1 tablet by mouth once daily at   
bedtime, 30 days, 5 refills  
- Blood Glucose System Sriram Kit use to check sugars once daily (use what is 
covered),   
30 days, 0 refills  
- busPIRone HCl 10 MG Oral Tablet take 1 tablet by mouth twice daily (d/c 5 mg 
rx),   
30 days, 5 refills  
- CareSens Lancets Miscellaneous use to check sugars once daily (dispense what 
is   
covered), 90 days, 3 refills  
- Colace 100 MG Oral Capsule take 1 capsule by mouth once daily at bedtime as 
needed   
for constipation, 30 days, 5 refills  
- [employer] Iron 325 (65 Fe) MG Oral Tablet take 1 tablet by mouth once daily, 30 
days, 5   
refills  
- Intuniv 1 MG Oral Tablet Extended Release 24 Hour take 1 tablet by mouth once 
daily   
at bedtime, 30 days, 5 refills  
- Januvia 50 MG Oral Tablet take 1 tablet by mouth once daily, 30 days, 5 
refills  
- lamoTRIgine 100 MG Oral Tablet take 1 tablet by mouth once daily, 30 days, 5   
refills  
- Lisinopril 5 MG Oral Tablet take 1 tablet by mouth once daily, 30 days, 5 
refills  
- MiraLax 17 GM/SCOOP Oral Powder mix 1 scoop with 8 ounces once daily, 30 days,
 2   
refills  
- Narcan 4 MG/0.1ML Nasal Liquid spray in nostril for symptoms of overdose , may
   
repeat in 2 minutes if symptoms persist, 1 days, 0 refills  
- OneTouch Ultra In Vitro Strip USE ONE TEST STRIP TO CHECK BLOOD SUGARS ONCE 
DAILY,   
90 days, 3 refills  
- Prazosin HCl 1 MG Oral Capsule take 1 capsule by mouth twice daily, 30 days, 5
   
refills  
- SEROquel 50 MG Oral Tablet take 1 tablet by mouth once daily at bedtime (d/c   
trazodone), 30 days, 1 refills  
- Sertraline HCl 50 MG Oral Tablet take 1 tablet by mouth once daily, 30 days, 5
   
refills  
- Slynd 4 MG Oral Tablet take 1 tablet by mouth at the same time everyday to 
help   
regulate your period, 28 days, 2 refills  
  
** Past Medical/Surgical History **  
Reported:  
Has sex without a condom.  
Medical: No previous hospitalizations. Chronic illness and Sexually transmitted   
infection Partners sexually transmitted infection status known.  
Exposure: Exposure to COVID-19.  
Pregnancy: Previously pregnant 1 time(s) and para having 0 live birth(s). Not   
planning a pregnancy in the next year.  
Legal Documents: Consent form on file for procedure.  
Diagnoses:  
Polycystic Ovarian Syndrome (PCOS).  
Migraine headache.  
Psychiatric disorders biopolar disorder  
Anxiety disorder  
POTS.  
Procedural:  
- Insertion of ear pressure equalization tubes in both ears  
Surgical:  
- Tonsillectomy  
- Tonsillectomy with adenoidectomy  
  
** Social History **  
Environmental Exposure: Secondhand cigarette smoke exposure.  
Tobacco use: Using electronic cigarettes/vaping.  
Alcohol: Not using alcohol.  
Drug Use: Not using drugs denied by patient.  
Sexual: Sexually active, sexual orientation Lesbian or David, gender identity 
Other,   
and birth control is being practiced Pills.  
  
** Allergies **  
- Abilify Reaction: groggy  
- Augmentin Reaction: Shock  
- Metformin Reaction: Nausea, Vomiting  
- NO KNOWN ENVIRONMENTAL ALLERGIES  
- NO KNOWN FOOD ALLERGIES  
- Sertraline Reaction: slow/groggy  
- Trazodone Hydrochloride Reaction: Panic attack  
  
** Family History **  
Paternal:  
Systemic hypertension  
Oncologic disorder  
Maternal:  
Systemic hypertension  
Epilepsy and recurrent seizures  
Psychiatric disorders  
Oncologic disorder  
Fraternal:  
Psychiatric disorders  
  
** Review Of Systems **  
Head: No head symptoms.  
Neck: No neck symptoms.  
Eyes: No eye symptoms.  
Otolaryngeal: No ear symptoms, no nasal symptoms, no nose and sinus finding, no   
throat symptoms, no oral cavity symptoms, and no jaw symptoms.  
Breasts: No breast symptoms.  
Cardiovascular: No cardiovascular symptoms and no chest pain or discomfort.  
Pulmonary: No pulmonary symptoms.  
Gastrointestinal: No gastrointestinal symptoms.  
Genitourinary: No genitourinary symptoms.  
Musculoskeletal: No musculoskeletal symptoms.  
Neurological: No neurological symptoms.  
Psychological: No psychological symptoms.  
Skin: No skin symptoms.  
Cardiovascular: Edema not present.  
  
** Physical Findings **  
- Vitals taken 2024 04:27 pm  
BP-Sitting L135/86 mmHg  
BP Cuff SizeLarge  
Pulse Rate-[id number] bpm  
Respiration Rate18 per min  
Temp-Oral97.6 F  
Hkjder22 in  
Mcvead920 lbs  
Body Mass Index57.2 kg/m2  
Body Surface Area2.4 m2  
Oxygen [id number] %  
  
Vital Signs:  
- Systolic blood pressure 130 - 139 mmHg.  
- Diastolic blood pressure 80-89 mmHg.  
General Appearance:  
- Awake. - Alert. - Well developed. - Well nourished. - In no acute distress.  
Lungs:  
- Respiration rhythm and depth was normal. - Clear to auscultation.  
Cardiovascular:  
Heart Rate And Rhythm: - Normal.  
Heart Sounds: - Normal.  
Murmurs: - No murmurs were heard.  
Musculoskeletal System:  
General/bilateral: - Normal movement of all extremities.  
Foot:  
General/bilateral: - Normal 10-g monofilament exam of right foot. - Normal 10-g   
monofilament exam of left foot.  
Skin:  
- General appearance was normal.  
Physician's Services:  
- Diabetic Foot Exam Abnormal  
  
** Tests **  
Blood Analysis:  
Blood Endocrine Laboratory Tests: Value  
Blood glucose level by fingerstick Non-fasting 122 mg/dl  
Laboratory-based Chemistry:  
Other Laboratory Tests:  
Screening for sexually transmitted infections was not performed.  
Imaging:  
Ophthalmoscopy:  
Optomap completed Both eyes (OU) and with physician / healthcare professional   
interpretation and report.  
  
** Assessment **  
- Z68.43 - Body mass index [BMI] 50.0-59.9, adult  
- Z23 - Encounter for immunization  
- K02.9 - Dental caries, unspecified  
- E11.9 - Type 2 diabetes mellitus without complications  
- F31.31 - Bipolar disorder, current episode depressed, mild  
  
** Previous Tests **  
Laboratory Studies:  
Renal Function:  
Glomerular filtration rate 2024 >60.  
  
** Therapy **  
- Patient has agreed to receive flu vaccine today.  
- Silver diamine fluoride 38% application by physician or other QHP 10 Teeth.  
- Immunization administration, by injection, one vaccine.  
  
** Vaccinations **  
- Fluarix 0.5mL for 6 months and older Dose #2 Status: Administered Date: 
2024  
  
- Received dose of Fluarix 0.5mL (6 months and up)  
  
** Counseling/Education **  
- Wishing to stop using electronic cigarettes/vaping  
- Discussed nutritional needs teach healthy choices including fruits and 
vegetables  
- Patient education about a proper diet  
- Discussed concerns about exercise: promote physical activity  
  
** Plan **  
StartCited- Attention-deficit hyperactivity disorder, unspecified type  
Intuniv 1 MG tablet take 1 tablet by mouth once daily at bedtime, 30 days, 5 
refills  
EndCited  
StartCited- Other  
Follow-up Appointment  
1 month med check  
lamoTRIgine 100 MG tablet take 1 tablet by mouth once daily, 30 days, 5 refills  
Prazosin HCl 1 MG capsule take 1 capsule by mouth twice daily, 30 days, 5 
refills  
Sertraline HCl 50 MG tablet take 1 tablet by mouth once daily, 30 days, 5 
refills  
EndCited  
** Care Team **  
- Doreen Cabrera CNP  
  
** Health Reminders **  
- Assess BMI satisfied 2024.  
- Assess Tobacco Use satisfied 2024.  
- Eye Exam satisfied 2024.  
- Follow Up Plan BMI Management satisfied 2024.  
- Foot Exam satisfied 2024.  
  
** User Defined 1 **  
Not planning a pregnancy in the next year.  
  
** Bottom of Document **  
Illustration  
  
  
[facility]09- Progress note*   
  
Progress note  
  
  
  
                                Date            Encounter       Last Documented   
by  
   
                                2024      Medical Established Patient Last  
 documented on 10/09/2024; 2:09 PM,   
Doreen Cabrera CNP; [facility]  
  
  
  
                                                      
  
  
** Active Problems & Conditions **  
- F90.9 - Attention-deficit Hyperactivity Disorder  
- F31.31 - Bipolar I Disorder, Most Recent Episode, Depressed Mild  
- E11.9 - Diabetes Mellitus Type 2 Without Complication  
- N94.6 - Dysmenorrhea  
- F43.10 - Post-traumatic Stress Disorder  
  
** Chief Complaint **  
The Chief Complaint is: Patient reports her mental health is a problem. Patient 
isn't   
sure the zoloft is working for her. Patient said she is feeling very down. 
Patient is   
not sleeping, but has not picked up seroquel. Patient has been still taking the   
trazodone.  
  
** Referred Here **  
Not referred by urgent care clinic and not the emergency room. No prior 
encounters.  
- Data to be reviewed: no clinical lab tests  
  
** History of Present Illness **  
Linnette Beckham is a 24 year old female.  
- Allergy list reviewed - Reviewed Medications - Medication list reviewed  
- Date of last menstruation 2024 - Pregnancy test - would not like a 
pregnancy   
test  
Presents for med follow up , did not know seroquel was ready at the pharmacy so 
hasnt   
been taking . we do not have any hpv vaccines here today , needs 2nd dose. is 
going   
thru a vape about every 3 days , wants to quit but aware it would be better to 
wait   
until mental health is better , advised on the harm of nicotine / vapes  
  
** Current Medication **  
- Alcohol Pads 70% use to cleanse skin prior to checking blood sugars, 90 days, 
3   
refills  
- ARIPiprazole 5 MG Oral Tablet take 1 tablet by mouth once daily, 30 days, 5 
refills  
- Atorvastatin Calcium 20 MG Oral Tablet take 1 tablet by mouth once daily at   
bedtime, 30 days, 5 refills  
- Blood Glucose System Sriram Kit use to check sugars once daily (use what is 
covered),   
30 days, 0 refills  
- busPIRone HCl 10 MG Oral Tablet take 1 tablet by mouth twice daily (d/c 5 mg 
rx),   
30 days, 5 refills  
- CareSens Lancets Miscellaneous use to check sugars once daily (dispense what 
is   
covered), 90 days, 3 refills  
- Colace 100 MG Oral Capsule take 1 capsule by mouth once daily at bedtime as 
needed   
for constipation, 30 days, 5 refills  
- [facility] Iron 325 (65 Fe) MG Oral Tablet take 1 tablet by mouth once daily, 30 
days, 5   
refills  
- Intuniv 1 MG Oral Tablet Extended Release 24 Hour take 1 tablet by mouth once 
daily   
at bedtime, 30 days, 5 refills  
- Januvia 50 MG Oral Tablet take 1 tablet by mouth once daily, 30 days, 5 
refills  
- lamoTRIgine 100 MG Oral Tablet take 1 tablet by mouth once daily, 30 days, 5   
refills  
- Lisinopril 5 MG Oral Tablet take 1 tablet by mouth once daily, 30 days, 5 
refills  
- MiraLax 17 GM/SCOOP Oral Powder mix 1 scoop with 8 ounces once daily, 30 days,
 2   
refills  
- Narcan 4 MG/0.1ML Nasal Liquid spray in nostril for symptoms of overdose , may
   
repeat in 2 minutes if symptoms persist, 1 days, 0 refills  
- OneTouch Ultra In Vitro Strip USE ONE TEST STRIP TO CHECK BLOOD SUGARS ONCE 
DAILY,   
90 days, 3 refills  
- Prazosin HCl 1 MG Oral Capsule take 1 capsule by mouth twice daily, 30 days, 5
   
refills  
- SEROquel 50 MG Oral Tablet take 1 tablet by mouth once daily at bedtime (d/c   
trazodone), 30 days, 1 refills  
- Sertraline HCl 50 MG Oral Tablet take 1 tablet by mouth once daily, 30 days, 5
   
refills  
- Slynd 4 MG Oral Tablet take 1 tablet by mouth at the same time everyday to 
help   
regulate your period, 28 days, 2 refills  
  
** Past Medical/Surgical History **  
Reported:  
Has sex without a condom.  
Medical: No previous hospitalizations. Chronic illness and Sexually transmitted   
infection Partners sexually transmitted infection status known.  
Exposure: Exposure to COVID-19.  
Pregnancy: Previously pregnant 1 time(s) and para having 0 live birth(s). Not   
planning a pregnancy in the next year.  
Legal Documents: Consent form on file for procedure.  
Diagnoses:  
Polycystic Ovarian Syndrome (PCOS).  
Migraine headache.  
Psychiatric disorders biopolar disorder  
Anxiety disorder  
POTS.  
Procedural:  
- Insertion of ear pressure equalization tubes in both ears  
Surgical:  
- Tonsillectomy  
- Tonsillectomy with adenoidectomy  
  
** Social History **  
Environmental Exposure: Secondhand cigarette smoke exposure.  
Tobacco use: Using electronic cigarettes/vaping.  
Alcohol: Not using alcohol.  
Drug Use: Not using drugs denied by patient.  
Sexual: Sexually active, sexual orientation Lesbian or David, gender identity 
Other,   
and birth control is being practiced Pills.  
  
** Allergies **  
- Abilify Reaction: groggy  
- Augmentin Reaction: Shock  
- Metformin Reaction: Nausea, Vomiting  
- NO KNOWN ENVIRONMENTAL ALLERGIES  
- NO KNOWN FOOD ALLERGIES  
- Sertraline Reaction: slow/groggy  
- Trazodone Hydrochloride Reaction: Panic attack  
  
** Family History **  
Paternal:  
Systemic hypertension  
Oncologic disorder  
Maternal:  
Systemic hypertension  
Epilepsy and recurrent seizures  
Psychiatric disorders  
Oncologic disorder  
Fraternal:  
Psychiatric disorders  
  
** Review Of Systems **  
Head: No head symptoms.  
Neck: No neck symptoms.  
Eyes: No eye symptoms.  
Otolaryngeal: No ear symptoms, no nasal symptoms, no nose and sinus finding, no   
throat symptoms, no oral cavity symptoms, and no jaw symptoms.  
Breasts: No breast symptoms.  
Cardiovascular: No cardiovascular symptoms and no chest pain or discomfort.  
Pulmonary: No pulmonary symptoms.  
Gastrointestinal: No gastrointestinal symptoms.  
Genitourinary: No genitourinary symptoms.  
Musculoskeletal: No musculoskeletal symptoms.  
Neurological: No neurological symptoms.  
Psychological: No psychological symptoms.  
Skin: No skin symptoms.  
Cardiovascular: Edema not present.  
  
** Physical Findings **  
- Vitals taken 2024 04:27 pm  
BP-Sitting L135/86 mmHg  
BP Cuff SizeLarge  
Pulse Rate-Yrkdeac585 bpm  
Respiration Rate18 per min  
Temp-Oral97.6 F  
Xtrmcb76 in  
Osygty143 lbs  
Body Mass Index57.2 kg/m2  
Body Surface Area2.4 m2  
Oxygen Lnljfmlfih27 %  
  
Vital Signs:  
- Systolic blood pressure 130 - 139 mmHg.  
- Diastolic blood pressure 80-89 mmHg.  
General Appearance:  
- Awake. - Alert. - Well developed. - Well nourished. - In no acute distress.  
Lungs:  
- Respiration rhythm and depth was normal. - Clear to auscultation.  
Cardiovascular:  
Heart Rate And Rhythm: - Normal.  
Heart Sounds: - Normal.  
Murmurs: - No murmurs were heard.  
Musculoskeletal System:  
General/bilateral: - Normal movement of all extremities.  
Foot:  
General/bilateral: - Normal 10-g monofilament exam of right foot. - Normal 10-g   
monofilament exam of left foot.  
Skin:  
- General appearance was normal.  
Physician's Services:  
- Diabetic Foot Exam Abnormal  
  
** Tests **  
Blood Analysis:  
Blood Endocrine Laboratory Tests: Value  
Blood glucose level by fingerstick Non-fasting 122 mg/dl  
Laboratory-based Chemistry:  
Other Laboratory Tests:  
Screening for sexually transmitted infections was not performed.  
Imaging:  
Ophthalmoscopy:  
No apparent diabetic retinopathy.  
Optomap completed Both eyes (OU) and with physician / healthcare professional   
interpretation and report.  
  
** Assessment **  
- Z68.43 - Body mass index [BMI] 50.0-59.9, adult  
- Z23 - Encounter for immunization  
- K02.9 - Dental caries, unspecified  
- E11.9 - Type 2 diabetes mellitus without complications  
- F31.31 - Bipolar disorder, current episode depressed, mild  
  
** Previous Tests **  
Laboratory Studies:  
Renal Function:  
Glomerular filtration rate 2024 >60.  
  
** Therapy **  
- Patient has agreed to receive flu vaccine today.  
- Silver diamine fluoride 38% application by physician or other QHP 10 Teeth.  
- Immunization administration, by injection, one vaccine.  
  
** Vaccinations **  
- Fluarix 0.5mL for 6 months and older Dose #2 Status: Administered Date: 
2024  
  
- Received dose of Fluarix 0.5mL (6 months and up)  
  
** Counseling/Education **  
- Wishing to stop using electronic cigarettes/vaping  
- Discussed nutritional needs teach healthy choices including fruits and 
vegetables  
- Patient education about a proper diet  
- Discussed concerns about exercise: promote physical activity  
  
** Plan **  
StartCited- Attention-deficit hyperactivity disorder, unspecified type  
Intuniv 1 MG tablet take 1 tablet by mouth once daily at bedtime, 30 days, 5 
refills  
EndCited  
StartCited- Other  
Follow-up Appointment  
1 month med check  
lamoTRIgine 100 MG tablet take 1 tablet by mouth once daily, 30 days, 5 refills  
Prazosin HCl 1 MG capsule take 1 capsule by mouth twice daily, 30 days, 5 
refills  
Sertraline HCl 50 MG tablet take 1 tablet by mouth once daily, 30 days, 5 
refills  
EndCited  
** Care Team **  
- Doreen Cabrera CNP  
  
** Health Reminders **  
- Assess BMI satisfied 2024.  
- Assess Tobacco Use satisfied 2024.  
- Eye Exam satisfied 10/09/2024.  
- Follow Up Plan BMI Management satisfied 2024.  
- Foot Exam satisfied 2024.  
  
** User Defined 1 **  
Not planning a pregnancy in the next year.  
  
** Bottom of Document **  
[facility]  
  
  
Health [facility]09- Reason for referral (narrative)*   
  
                          Date         Encounter Description Provider     Reason  
 for Referral  
   
                          24      Established Patient Radha Young LSW R  
eferral To Mental Health Team  
   
                          22      Established Patient Haylie Jeff MOYA  
CC-S Referral To Mental Health  
 Team  
   
                          21     Medical New Patient Doreen Cabrera CNP Refe  
rral To Mental Health Team  
  
  
[facility]  
Work Phone: [phone]- Evaluation note  
  
Includes: Assessments for all patient encounters  
  
  
  
                                Findings        Encounter       Date  
   
                                                    Attention-deficit hyperactiv  
ity   
disorder                                 Established Patient with Leonie   
Short LISWS                             2024  
  
  
  
                                                    Last Documented On   
4 6:28PM ; [facility]  
  
  
  
                                                    Bipolar I disorder, most rec  
ent   
episode, depressed - mild                Established Patient with Leonie   
Short LISWS                             2024  
  
  
  
                                                    Last Documented On   
4 6:28PM ; [facility]  
  
  
  
                                        Post-traumatic stress disorder  Establ  
ished Patient with Leonie Short   
LISWS                                   2024  
  
  
  
                                                    Last Documented On   
4 6:28PM ; [facility]  
  
  
  
                                                    [E11.9 - Type 2 diabetes lobito  
litus   
without complications] type 2 diabetes   
mellitus                                Medical Established Patient with   
Doreen Cabrera CNP                        2024  
  
  
  
                                                    Last Documented On   
4 7:36PM ; [facility]  
  
  
  
                                                    [F41.1 - Generalized anxiety  
   
disorder] generalized anxiety   
disorder                                Medical Established Patient with   
Doreen Cabrera CNP                        2024  
  
  
  
                                                    Last Documented On   
4 7:36PM ; [facility]  
  
  
  
                                                    [I10 - Essential (primary)   
hypertension] essential hypertension    Medical Established Patient with   
Doreen Cabrera CNP                        2024  
  
  
  
                                                    Last Documented On   
4 7:36PM ; [facility]  
  
  
  
                                                    [N94.6 - Dysmenorrhea, unspe  
cified]   
dysmenorrhea                            Medical Established Patient with   
Doreen Cabrera CNP                        2024  
  
  
  
                                                    Last Documented On   
4 7:36PM ; [facility]  
  
  
  
                                                    [Z68.43 - Body mass index [B  
MI]   
50.0-59.9, adult] assessment of body   
mass index                              Medical Established Patient with   
Doreen Cabrera CNP                        2024  
  
  
  
                                                    Last Documented On   
4 7:36PM ; [facility]  
  
  
  
                                        Encounter for Immunization Medical Estab  
lished Patient with Doreen Cabrera   
CNP                                     2024  
  
  
  
                                                    Last Documented On   
4 7:36PM ; [facility]  
  
  
  
                                        Venipuncture was performed Medical Estab  
lished Patient with Doreen Cabrera   
CNP                                     2024  
  
  
  
                                                    Last Documented On   
4 7:36PM ; [facility]  
  
  
  
                                        Attention-deficit hyperactivity disorder  
  Established Patient with   
Leonie Short LISWS                     2024  
  
  
  
                                                    Last Documented On   
4 10:10AM ; [facility]  
  
  
  
                                                    Bipolar I disorder, most rec  
ent   
episode, depressed - mild                Established Patient with Leonie   
Short LISWS                             2024  
  
  
  
                                                    Last Documented On   
4 10:10AM ; [facility]  
  
  
  
                                        Post-traumatic stress disorder  Establ  
ished Patient with Leonie Short   
LISWS                                   2024  
  
  
  
                                                    Last Documented On   
4 10:10AM ; [facility]  
  
  
  
                                        [D64.9 - Anemia, unspecified] anemia Med  
ical Established Patient with   
Doreen Cabrera CNP                        2024  
  
  
  
                                                    Last Documented On   
4 6:51PM ; [facility]  
  
  
  
                                                    [Z68.43 - Body mass index [B  
MI]   
50.0-59.9, adult] assessment of body   
mass index                              Medical Established Patient with   
Doreen Cabrera CNP                        2024  
  
  
  
                                                    Last Documented On   
4 6:51PM ; [facility]  
  
  
  
                                                    Bipolar affective disorder,   
current   
episode depressed, mild                  Established Patient with Leonie   
Short LISWS                             2024  
  
  
  
                                                    Last Documented On   
4 5:16PM ; [facility]  
  
  
  
                                        Post-traumatic stress disorder  Establ  
ished Patient with Leonie Short   
LISWS                                   2024  
  
  
  
                                                    Last Documented On   
4 5:16PM ; [facility]  
  
  
  
                                                    Undifferentiated attention d  
eficit   
disorder                                 Established Patient with   
Leonie Short LISWS                     2024  
  
  
  
                                                    Last Documented On   
4 5:16PM ; [facility]  
  
  
  
                                        Visit for: screening for disorder  Est  
ablished Patient with Leonie   
Short LISWS                             2024  
  
  
  
                                                    Last Documented On   
4 5:16PM ; [facility]  
  
  
  
                                                    [Z68.43 - Body mass index [B  
MI]   
50.0-59.9, adult] assessment of body   
mass index                              Medical Established Patient with   
Doreen Cabrera CNP                        2024  
  
  
  
                                                    Last Documented On   
4 7:50PM ; [facility]  
  
  
  
                                        Attention-deficit hyperactivity disorder  
 Medical Established Patient with   
Doreen Cabrera CNP                        2024  
  
  
  
                                                    Last Documented On   
4 7:50PM ; [facility]  
  
  
  
                                                    Diabetes Risk Test Score was  
 three   
score 3/27/2024                         Medical Established Patient with Doreen Cabrera CNP                              2024  
  
  
  
                                                    Last Documented On   
4 7:50PM ; [facility]  
  
  
  
                                        Visit for: screening for STD Medical Est  
ablished Patient with Doreen Cabrera   
CNP                                     2024  
  
  
  
                                                    Last Documented On   
4 7:50PM ; [facility]  
  
  
  
                                                    [Z68.32 - Body mass index [B  
MI]   
32.0-32.9, adult] assessment of body   
mass index                              Medical Established Patient with   
Doreen Cabrera CNP                        2023  
  
  
  
                                                    Last Documented On   
3 6:01PM ; [facility]  
  
  
  
                                        Borderline personality disorder Medical   
Established Patient with Doreen Cabrera CNP                              2023  
  
  
  
                                                    Last Documented On   
3 6:01PM ; [facility]  
  
  
  
                                        Screening for diabetes mellitus Medical   
Established Patient with Doreen Cabrera CNP                              2023  
  
  
  
                                                    Last Documented On   
3 6:01PM ; [facility]  
  
  
  
                                        Assessment of body mass index Medical Es  
tablished Patient with Doreen Cabrera CNP                              10/21/2022  
  
  
  
                                                    Last Documented On 10/21/202  
2 2:45PM ; [facility]  
  
  
  
                                                    Bipolar affective disorder,   
current   
episode manic                            Telebehavioral Health with Haylienicanor Aguilar LPCC-S                        2022  
  
  
  
                                                    Last Documented On   
2 3:22PM ; [facility]  
  
  
  
                                                    Bipolar affective disorder,   
current   
episode manic                            Established Patient with Haylienicanor Martinerson LPCC-S                        2022  
  
  
  
                                                    Last Documented On   
2 2:28PM ; [facility]  
  
  
  
                                                    Bipolar affective disorder,   
current   
episode depressed, severe with   
psychosis                                Telebehavioral Health with Haylienicanor Martinerson LPCC-S                        2022  
  
  
  
                                                    Last Documented On   
2 3:29PM ; [facility]  
  
  
  
                                                    Assessment of body mass inde  
x [Body   
mass index [BMI] 50.0-59.9, adult]      Open Access - Established with Julieth   
Aliya CNP                               2022  
  
  
  
                                                    Last Documented On   
2 9:36AM ; [facility]  
  
  
  
                                                    Bipolar I disorder, most rec  
ent   
episode, manic                          Open Access - Established with Julieth   
Aliya CNP                               2022  
  
  
  
                                                    Last Documented On   
2 9:36AM ; [facility]  
  
  
  
                                        Post-traumatic stress disorder BH Establ  
ished Patient with Haylienicanor MartinAguilar   
LPCC-S                                  2022  
  
  
  
                                                    Last Documented On   
2 3:20PM ; [facility]  
  
  
  
                                No cough        Medical Established Patient with  
 Doreen Deborah CNP 2022  
  
  
  
                                                    Last Documented On   
2 4:04PM ; [facility]  
  
  
  
                                                    Z68.43 - Body mass index [BM  
I]   
50.0-59.9, adult                        Medical Established Patient with   
Doreen Deborah CNP                        2022  
  
  
  
                                                    Last Documented On   
2 4:04PM ; [facility]  
  
  
  
                                                    Borderline personality disor  
danny Pt   
reported hx of sx/dx                    BH Established Patient with Haylienicanor Martinerson LPCC-S                        2022  
  
  
  
                                                    Last Documented On   
2 4:08PM ; [facility]  
  
  
  
                                                    Assessment of body mass inde  
x [Body   
mass index [BMI] 50.0-59.9, adult]      Open Access - Established with Julieth   
Aliya CNP                               2022  
  
  
  
                                                    Last Documented On   
2 7:41PM ; [facility]  
  
  
  
                                                    Diabetes Risk Test Score was  
 three   
score 2022                         Open Access - Established with Julieth   
Aliya CNP                               2022  
  
  
  
                                                    Last Documented On   
2 7:41PM ; [facility]  
  
  
  
                                                    Bipolar I disorder, most rec  
ent   
episode, manic                          BH Established Patient with Avis   
Felder LPCC-S                          2021  
  
  
  
                                                    Last Documented On   
1 1:35AM ; [facility]  
  
  
  
                                        Borderline personality disorder BH Estab  
lished Patient with Avis   
Felder LPCC-S                          2021  
  
  
  
                                                    Last Documented On   
1 1:35AM ; [facility]  
  
  
  
                                        Post-traumatic stress disorder BH Establ  
ished Patient with Avis   
Felder LPCC-S                          2021  
  
  
  
                                                    Last Documented On   
1 1:35AM ; [facility]  
  
  
  
                                                    Assessment of visit for: bhargavi myles for   
human immunodeficiency virus            Medical Established Patient with   
Doreen Deborah CNP                        2021  
  
  
  
                                                    Last Documented On   
1 2:56PM ; [facility]  
  
  
  
                                Nicotine dependence Medical Established Patient   
with Doreen Deborah CNP 2021  
  
  
  
                                                    Last Documented On   
1 2:56PM ; [facility]  
  
  
  
                                Tachycardia     Medical Established Patient with  
 Doreen Deborah CNP 2021  
  
  
  
                                                    Last Documented On   
1 2:56PM ; [facility]  
  
  
  
                                                    Z68.43 - Body mass index [BM  
I]   
50.0-59.9, adult                        Medical Established Patient with   
Doreen Deborah CNP                        2021  
  
  
  
                                                    Last Documented On   
1 2:56PM ; [facility]  
  
  
  
                                                    Bipolar I disorder, most rec  
ent   
episode, manic                           Established Patient with Leonie   
Short LISWS                             2021  
  
  
  
                                                    Last Documented On   
1 10:17AM ; [facility]  
  
  
  
                                                    Borderline personality disor  
danny per   
patient reported history                 Established Patient with Leonie   
Short LISWS                             2021  
  
  
  
                                                    Last Documented On   
1 10:17AM ; [facility]  
  
  
  
                                Nicotine dependence  Established Patient with   
Leonie Short LISWS 2021  
  
  
  
                                                    Last Documented On   
1 10:17AM ; [facility]  
  
  
  
                                        Post-traumatic stress disorder  Establ  
ished Patient with Leonie Short   
LISWS                                   2021  
  
  
  
                                                    Last Documented On   
1 10:17AM ; [facility]  
  
  
  
                                Body mass index Medical Established Patient with  
 Doreen Deborah CNP 2021  
  
  
  
                                                    Last Documented On   
1 5:23PM ; [facility]  
  
  
  
                                Morbid obesity  Medical Established Patient with  
 Doreen Deborah CNP 2021  
  
  
  
                                                    Last Documented On   
1 5:23PM ; [facility]  
  
  
  
                                        Nicotine dependence uncomplicated Medica  
l Established Patient with Doreen   
Deborah CNP                              2021  
  
  
  
                                                    Last Documented On   
1 5:23PM ; [facility]  
  
  
  
                                                    Z68.42 - Body mass index [BM  
I]   
45.0-49.9, adult                        Medical Established Patient with   
Doreen Deborah CNP                        2021  
  
  
  
                                                    Last Documented On   
1 5:23PM ; [facility]  
  
  
  
                                Episodic mood disorders On Call with Pamela Velezammy edwards CNP 2021  
  
  
  
                                                    Last Documented On   
1 7:49AM ; [facility]  
  
  
  
                                                    Mood disorders, NOS per tabatha  
ent   
reported history                         Established Patient with Leoine   
Short LISWS                             2021  
  
  
  
                                                    Last Documented On   
1 7:15PM ; [facility]  
  
  
  
                                        Post-traumatic stress disorder  Establ  
ished Patient with Leonie Short   
LISWS                                   2021  
  
  
  
                                                    Last Documented On   
1 7:15PM ; [facility]  
  
  
  
                                Morbid obesity  Medical Established Patient with  
 Doreen Deborah CNP 2021  
  
  
  
                                                    Last Documented On   
1 12:31PM ; [facility]  
  
  
  
                                Otitis externa  Medical Established Patient with  
 Doreen Deborah CNP 2021  
  
  
  
                                                    Last Documented On   
1 12:31PM ; [facility]  
  
  
  
                                                    Z68.42 - Body mass index [BM  
I]   
45.0-49.9, adult                        Medical Established Patient with   
Doreen Deborah CNP                        2021  
  
  
  
                                                    Last Documented On   
1 12:31PM ; [facility]  
  
  
  
                                        Post-traumatic stress disorder  Establ  
ished Patient with Leonie Short   
LISWS                                   06/10/2021  
  
  
  
                                                    Last Documented On 06/10/202  
1 10:05PM ; [facility]  
  
  
  
                                Morbid obesity  Medical Established Patient with  
 Doreen Deborah CNP 06/10/2021  
  
  
  
                                                    Last Documented On 06/10/202  
1 4:45PM ; [facility]  
  
  
  
                                                    Z68.42 - Body mass index [BM  
I]   
45.0-49.9, adult                        Medical Established Patient with   
Doreen Deborah CNP                        06/10/2021  
  
  
  
                                                    Last Documented On 06/10/202  
1 4:45PM ; [facility]  
  
  
  
                                        Post-traumatic stress disorder  Establ  
ished Patient with Leonie Short   
LISWS                                   2021  
  
  
  
                                                    Last Documented On   
1 11:59AM ; [facility]  
  
  
  
                                                    Assessment of visit for: bhargavi myles for   
human immunodeficiency virus            Medical New Patient with Doreen   
Deborah CNP                              2021  
  
  
  
                                                    Last Documented On   
1 4:06PM ; [facility]  
  
  
  
                                                    Diabetes Risk Test Score was  
 one score   
2021                               Medical New Patient with Doreen Cabrera CNP                              2021  
  
  
  
                                                    Last Documented On   
1 4:06PM ; [facility]  
  
  
  
                                Hypertension    Medical New Patient with Doreenpaola esposito CNP 2021  
  
  
  
                                                    Last Documented On   
1 4:06PM ; [facility]  
  
  
  
                                Morbid obesity  Medical New Patient with Doreen DAVID esposito CNP 2021  
  
  
  
                                                    Last Documented On   
1 4:06PM ; [facility]  
  
  
  
                                Post-traumatic stress disorder Medical New Patie  
nt with Doreenpaola Mccormacken CNP   
2021  
  
  
  
                                                    Last Documented On   
1 4:06PM ; [facility]  
  
  
  
                                                    Z68.42 - Body mass index [BM  
I]   
45.0-49.9, adult                        Medical New Patient with Doeren Cabrera CNP                              2021  
  
  
  
                                                    Last Documented On   
1 4:06PM ; [facility]  
Work Phone: [phone]- Evaluation note  
  
Includes: Assessments for all patient encounters  
  
  
  
                                Findings        Encounter       Date  
   
                                                    Attention-deficit hyperactiv  
ity   
disorder                                 Established Patient with Leonie   
Short LISWS                             2024  
  
  
  
                                                    Last Documented On   
4 3:28PM ; [facility]  
  
  
  
                                                    Bipolar I disorder, most rec  
ent   
episode, depressed - mild                Established Patient with Leonie   
Short LISWS                             2024  
  
  
  
                                                    Last Documented On   
4 3:28PM ; [facility]  
  
  
  
                                        Post-traumatic stress disorder  Establ  
ished Patient with Leonie Short   
LISWS                                   2024  
  
  
  
                                                    Last Documented On   
4 3:28PM ; [facility]  
  
  
  
                                                    [E11.9 - Type 2 diabetes lobito  
litus   
without complications] type 2 diabetes   
mellitus                                Medical Established Patient with   
Doreenpaola Mccormacken CNP                        2024  
  
  
  
                                                    Last Documented On   
4 7:36PM ; [facility]  
  
  
  
                                                    [F41.1 - Generalized anxiety  
   
disorder] generalized anxiety   
disorder                                Medical Established Patient with   
Doreen Deborah CNP                        2024  
  
  
  
                                                    Last Documented On   
4 7:36PM ; [facility]  
  
  
  
                                                    [I10 - Essential (primary)   
hypertension] essential hypertension    Medical Established Patient with   
Doreen Cabrera CNP                        2024  
  
  
  
                                                    Last Documented On   
4 7:36PM ; [facility]  
  
  
  
                                                    [N94.6 - Dysmenorrhea, unspe  
cified]   
dysmenorrhea                            Medical Established Patient with   
Doreen Cabrera CNP                        2024  
  
  
  
                                                    Last Documented On   
4 7:36PM ; [facility]  
  
  
  
                                                    [Z68.43 - Body mass index [B  
MI]   
50.0-59.9, adult] assessment of body   
mass index                              Medical Established Patient with   
Doreen Cabrera CNP                        2024  
  
  
  
                                                    Last Documented On   
4 7:36PM ; [facility]  
  
  
  
                                        Encounter for Immunization Medical Estab  
lished Patient with Doreen Cabrera   
CNP                                     2024  
  
  
  
                                                    Last Documented On   
4 7:36PM ; [facility]  
  
  
  
                                        Venipuncture was performed Medical Estab  
lished Patient with Doreen Cabrera   
CNP                                     2024  
  
  
  
                                                    Last Documented On   
4 7:36PM ; [facility]  
  
  
  
                                        Attention-deficit hyperactivity disorder  
  Established Patient with   
Leonie Short LISWS                     2024  
  
  
  
                                                    Last Documented On   
4 10:10AM ; [facility]  
  
  
  
                                                    Bipolar I disorder, most rec  
ent   
episode, depressed - mild               BH Established Patient with Leonie   
Short LISWS                             2024  
  
  
  
                                                    Last Documented On   
4 10:10AM ; [facility]  
  
  
  
                                        Post-traumatic stress disorder  Establ  
ished Patient with Leonie Short   
LISWS                                   2024  
  
  
  
                                                    Last Documented On   
4 10:10AM ; [facility]  
  
  
  
                                        [D64.9 - Anemia, unspecified] anemia Med  
ical Established Patient with   
Doreen Cabrera CNP                        2024  
  
  
  
                                                    Last Documented On   
4 6:51PM ; [facility]  
  
  
  
                                                    [Z68.43 - Body mass index [B  
MI]   
50.0-59.9, adult] assessment of body   
mass index                              Medical Established Patient with   
Doreen Cabrera CNP                        2024  
  
  
  
                                                    Last Documented On   
4 6:51PM ; [facility]  
  
  
  
                                                    Bipolar affective disorder,   
current   
episode depressed, mild                  Established Patient with Leonie   
Short LISWS                             2024  
  
  
  
                                                    Last Documented On   
4 5:16PM ; [facility]  
  
  
  
                                        Post-traumatic stress disorder BH Establ  
ished Patient with Leonie Short   
LISWS                                   2024  
  
  
  
                                                    Last Documented On   
4 5:16PM ; [facility]  
  
  
  
                                                    Undifferentiated attention d  
eficit   
disorder                                 Established Patient with   
Leonie Short LISWS                     2024  
  
  
  
                                                    Last Documented On   
4 5:16PM ; [facility]  
  
  
  
                                        Visit for: screening for disorder BH Est  
ablished Patient with Leonie   
Short LISWS                             2024  
  
  
  
                                                    Last Documented On   
4 5:16PM ; [facility]  
  
  
  
                                                    [Z68.43 - Body mass index [B  
MI]   
50.0-59.9, adult] assessment of body   
mass index                              Medical Established Patient with   
Doreen Cabrera CNP                        2024  
  
  
  
                                                    Last Documented On   
4 7:50PM ; [facility]  
  
  
  
                                        Attention-deficit hyperactivity disorder  
 Medical Established Patient with   
Doreenpaola Cabrera CNP                        2024  
  
  
  
                                                    Last Documented On   
4 7:50PM ; [facility]  
  
  
  
                                                    Diabetes Risk Test Score was  
 three   
score 3/27/2024                         Medical Established Patient with Doreenpaola Mccormacken CNP                              2024  
  
  
  
                                                    Last Documented On   
4 7:50PM ; [facility]  
  
  
  
                                        Visit for: screening for STD Medical Est  
ablished Patient with Doreenpaola Cabrera   
CNP                                     2024  
  
  
  
                                                    Last Documented On   
4 7:50PM ; [facility]  
  
  
  
                                                    [Z68.32 - Body mass index [B  
MI]   
32.0-32.9, adult] assessment of body   
mass index                              Medical Established Patient with   
Doreenpaola Cabrera CNP                        2023  
  
  
  
                                                    Last Documented On   
3 6:01PM ; [facility]  
  
  
  
                                        Borderline personality disorder Medical   
Established Patient with Doreen   
Deborah CNP                              2023  
  
  
  
                                                    Last Documented On   
3 6:01PM ; [facility]  
  
  
  
                                        Screening for diabetes mellitus Medical   
Established Patient with Doreen   
Deborah CNP                              2023  
  
  
  
                                                    Last Documented On   
3 6:01PM ; [facility]  
  
  
  
                                        Assessment of body mass index Medical Es  
tablished Patient with Doreenpaola Mccormacken CNP                              10/21/2022  
  
  
  
                                                    Last Documented On 10/21/202  
2 2:45PM ; [facility]  
  
  
  
                                                    Bipolar affective disorder,   
current   
episode manic                            [facility] with Haylienicanor Aguilar LPCC-S                        2022  
  
  
  
                                                    Last Documented On   
2 3:22PM ; [facility]  
  
  
  
                                                    Bipolar affective disorder,   
current   
episode manic                            Established Patient with Haylienicanor Aguilar LPCC-S                        2022  
  
  
  
                                                    Last Documented On   
2 2:28PM ; [facility]  
  
  
  
                                                    Bipolar affective disorder,   
current   
episode depressed, severe with   
psychosis                                Telebehavior[facility] Health with Haylienicanor Aguilar LPCC-S                        2022  
  
  
  
                                                    Last Documented On   
2 3:29PM ; [facility]  
  
  
  
                                                    Assessment of body mass inde  
x [Body   
mass index [BMI] 50.0-59.9, adult]      Open Access - Established with Julieth   
Aliya CNP                               2022  
  
  
  
                                                    Last Documented On   
2 9:36AM ; [facility]  
  
  
  
                                                    Bipolar I disorder, most rec  
ent   
episode, manic                          Open Access - Established with Julieth   
Aliya CNP                               2022  
  
  
  
                                                    Last Documented On   
2 9:36AM ; [facility]  
  
  
  
                                        Post-traumatic stress disorder  Establ  
ished Patient with Haylienicanor Aguilar   
LPCC-S                                  2022  
  
  
  
                                                    Last Documented On   
2 3:20PM ; [facility]  
  
  
  
                                No cough        Medical Established Patient with  
 Doreen Deborah CNP 2022  
  
  
  
                                                    Last Documented On   
2 4:04PM ; [facility]  
  
  
  
                                                    Z68.43 - Body mass index [BM  
I]   
50.0-59.9, adult                        Medical Established Patient with   
Doreen Deborah CNP                        2022  
  
  
  
                                                    Last Documented On   
2 4:04PM ; [facility]  
  
  
  
                                                    Borderline personality disor  
danny Pt   
reported hx of sx/dx                     Established Patient with Haylienicanor Aguilar LPCC-S                        2022  
  
  
  
                                                    Last Documented On   
2 4:08PM ; [facility]  
  
  
  
                                                    Assessment of body mass inde  
x [Body   
mass index [BMI] 50.0-59.9, adult]      Open Access - Established with Julieth   
Aliya CNP                               2022  
  
  
  
                                                    Last Documented On   
2 7:41PM ; [facility]  
  
  
  
                                                    Diabetes Risk Test Score was  
 three   
score 2022                         Open Access - Established with Julieth   
Aliya CNP                               2022  
  
  
  
                                                    Last Documented On   
2 7:41PM ; [facility]  
  
  
  
                                                    Bipolar I disorder, most rec  
ent   
episode, manic                           Established Patient with Avis   
Felder LPCC-S                          2021  
  
  
  
                                                    Last Documented On   
1 1:35AM ; [facility]  
  
  
  
                                        Borderline personality disorder  Estab  
lished Patient with Avis   
Felder LPCC-S                          2021  
  
  
  
                                                    Last Documented On   
1 1:35AM ; [facility]  
  
  
  
                                        Post-traumatic stress disorder  Establ  
ished Patient with Avis   
Felder LPCC-S                          2021  
  
  
  
                                                    Last Documented On   
1 1:35AM ; [facility]  
  
  
  
                                                    Assessment of visit for: bhargavi myles for   
human immunodeficiency virus            Medical Established Patient with   
Doreen Deborah CNP                        2021  
  
  
  
                                                    Last Documented On   
1 2:56PM ; [facility]  
  
  
  
                                Nicotine dependence Medical Established Patient   
with Doreen Deborah CNP 2021  
  
  
  
                                                    Last Documented On   
1 2:56PM ; [facility]  
  
  
  
                                Tachycardia     Medical Established Patient with  
 Doreen Deborah CNP 2021  
  
  
  
                                                    Last Documented On   
1 2:56PM ; [facility]  
  
  
  
                                                    Z68.43 - Body mass index [BM  
I]   
50.0-59.9, adult                        Medical Established Patient with   
Doreenpaola Cabrera CNP                        2021  
  
  
  
                                                    Last Documented On   
1 2:56PM ; [facility]  
  
  
  
                                                    Bipolar I disorder, most rec  
ent   
episode, manic                           Established Patient with Leonie   
Short LISWS                             2021  
  
  
  
                                                    Last Documented On   
1 10:17AM ; [facility]  
  
  
  
                                                    Borderline personality disor  
danny per   
patient reported history                 Established Patient with Leonie   
Short LISWS                             2021  
  
  
  
                                                    Last Documented On   
1 10:17AM ; [facility]  
  
  
  
                                Nicotine dependence  Established Patient with   
Leonie Short LISWS 2021  
  
  
  
                                                    Last Documented On   
1 10:17AM ; [facility]  
  
  
  
                                        Post-traumatic stress disorder  Establ  
ished Patient with Leonie Short   
LISWS                                   2021  
  
  
  
                                                    Last Documented On   
1 10:17AM ; [facility]  
  
  
  
                                Body mass index Medical Established Patient with  
 Doreen Deborah CNP 2021  
  
  
  
                                                    Last Documented On   
1 5:23PM ; [facility]  
  
  
  
                                Morbid obesity  Medical Established Patient with  
 Doreen Deborah CNP 2021  
  
  
  
                                                    Last Documented On   
1 5:23PM ; [facility]  
  
  
  
                                        Nicotine dependence uncomplicated Medica  
l Established Patient with Doreen   
Deborah CNP                              2021  
  
  
  
                                                    Last Documented On   
1 5:23PM ; [facility]  
  
  
  
                                                    Z68.42 - Body mass index [BM  
I]   
45.0-49.9, adult                        Medical Established Patient with   
Doreen Deborah CNP                        2021  
  
  
  
                                                    Last Documented On   
1 5:23PM ; [facility]  
  
  
  
                                Episodic mood disorders On Call with Pamela edwards CNP 2021  
  
  
  
                                                    Last Documented On   
1 7:49AM ; [facility]  
  
  
  
                                                    Mood disorders, NOS per tabatha  
ent   
reported history                         Established Patient with Leonie   
Short LISWS                             2021  
  
  
  
                                                    Last Documented On   
1 7:15PM ; [facility]  
  
  
  
                                        Post-traumatic stress disorder  Establ  
ished Patient with Leonie Short   
LISWS                                   2021  
  
  
  
                                                    Last Documented On   
1 7:15PM ; [facility]  
  
  
  
                                Morbid obesity  Medical Established Patient with  
 Doreen Deborah CNP 2021  
  
  
  
                                                    Last Documented On   
1 12:31PM ; [facility]  
  
  
  
                                Otitis externa  Medical Established Patient with  
 Doreen Deborah CNP 2021  
  
  
  
                                                    Last Documented On   
1 12:31PM ; [facility]  
  
  
  
                                                    Z68.42 - Body mass index [BM  
I]   
45.0-49.9, adult                        Medical Established Patient with   
Doreen Deborah CNP                        2021  
  
  
  
                                                    Last Documented On   
1 12:31PM ; [facility]  
  
  
  
                                        Post-traumatic stress disorder  Establ  
ished Patient with Leonie Short   
LISWS                                   06/10/2021  
  
  
  
                                                    Last Documented On 06/10/202  
1 10:05PM ; [facility]  
  
  
  
                                Morbid obesity  Medical Established Patient with  
 Doreen Cabrera CNP 06/10/2021  
  
  
  
                                                    Last Documented On 06/10/202  
1 4:45PM ; [facility]  
  
  
  
                                                    Z68.42 - Body mass index [BM  
I]   
45.0-49.9, adult                        Medical Established Patient with   
Doreenpaola Cabrera CNP                        06/10/2021  
  
  
  
                                                    Last Documented On 06/10/202  
1 4:45PM ; [facility]  
  
  
  
                                        Post-traumatic stress disorder BH Establ  
ished Patient with Leonie Short   
LISWS                                   2021  
  
  
  
                                                    Last Documented On   
1 11:59AM ; [facility]  
  
  
  
                                                    Assessment of visit for: bhargavi myles for   
human immunodeficiency virus            Medical New Patient with Doreenpaola Cabrera CNP                              2021  
  
  
  
                                                    Last Documented On   
1 4:06PM ; [facility]  
  
  
  
                                                    Diabetes Risk Test Score was  
 one score   
2021                               Medical New Patient with Doreen Cabrera CNP                              2021  
  
  
  
                                                    Last Documented On   
1 4:06PM ; [facility]  
  
  
  
                                Hypertension    Medical New Patient with Doreen DAVID esposito CNP 2021  
  
  
  
                                                    Last Documented On   
1 4:06PM ; [facility]  
  
  
  
                                Morbid obesity  Medical New Patient with Doreen DAVID esposito CNP 2021  
  
  
  
                                                    Last Documented On   
1 4:06PM ; [facility]  
  
  
  
                                Post-traumatic stress disorder Medical New Patie  
nt with Doreenpaola Cabrera CNP   
2021  
  
  
  
                                                    Last Documented On   
1 4:06PM ; [facility]  
  
  
  
                                                    Z68.42 - Body mass index [BM  
I]   
45.0-49.9, adult                        Medical New Patient with Doreenpaola Cabrera CNP                              2021  
  
  
  
                                                    Last Documented On   
1 4:06PM ; [facility]  
Work Phone: [phone]- Progress note*   
  
Progress note  
  
  
  
                                Date            Encounter       Last Documented   
by  
   
                                2024      Medical Established Patient Last  
 documented on 2024; 7:36 PM,   
Doreen Cabrera CNP; [facility]  
  
  
  
                                                      
  
  
** Active Problems & Conditions **  
- F90.9 - Attention-deficit Hyperactivity Disorder  
- F31.31 - Bipolar I Disorder, Most Recent Episode, Depressed Mild  
- E11.9 - Diabetes Mellitus Type 2 Without Complication  
- N94.6 - Dysmenorrhea  
- F43.10 - Post-traumatic Stress Disorder  
  
** Chief Complaint **  
The Chief Complaint is: F/u on mental health. Patient stopped metformin and has   
gained weight. Patient has appointment scheduled with Dr. Patterson. Patient has 
been   
menstruating since stopping the metformin 4-5 months. Does not think buspirone 
is   
working because of increased anxiety.  
Patient did not get blood work completed.  
  
** Referred Here **  
Not referred by urgent care clinic and not the emergency room. No prior 
encounters.  
- Data to be reviewed: no clinical lab tests  
  
** History of Present Illness **  
Linnette Beckham is a 24 year old female.  
- Allergy list reviewed - Reviewed Medications - Medication list reviewed  
- Date of last menstruation patient unsure of date - Pregnancy test - would not 
like   
a pregnancy test  
Presents with sig other , anxiety is  not good  and has been bleeding ever since
   
stopping metformin r/t intolerance 3 months ago , has appt with gyn but not til   
october agreeable to progesterone ocp to help get bleeding to stop  
  
** Current Medication **  
- ARIPiprazole 5 MG Oral Tablet take 1 tablet by mouth once daily, 30 days, 5 
refills  
- Intuniv 1 MG Oral Tablet Extended Release 24 Hour take 1 tablet by mouth once 
daily   
at bedtime, 30 days, 5 refills  
- lamoTRIgine 100 MG Oral Tablet take 1 tablet by mouth once daily, 30 days, 5   
refills  
- MiraLax 17 GM/SCOOP Oral Powder mix 1 scoop with 8 ounces once daily, 30 days,
 2   
refills  
- Narcan 4 MG/0.1ML Nasal Liquid spray in nostril for symptoms of overdose , may
   
repeat in 2 minutes if symptoms persist, 1 days, 0 refills  
- Prazosin HCl 1 MG Oral Capsule take 1 capsule by mouth twice daily, 30 days, 5
   
refills  
- Sertraline HCl 50 MG Oral Tablet take 1 tablet by mouth once daily, 30 days, 5
   
refills  
- traZODone HCl 100 MG Oral Tablet take 1 tablet by mouth twice daily, 30 days, 
5   
refills  
  
** Past Medical/Surgical History **  
Reported:  
Has sex without a condom.  
Medical: No previous hospitalizations. Chronic illness and Sexually transmitted   
infection Partners sexually transmitted infection status known.  
Immunization History: Recent immunization for flu.  
Exposure: Exposure to COVID-19.  
Pregnancy: Previously pregnant 1 time(s) and para having 0 live birth(s). Not   
planning a pregnancy in the next year.  
Diagnoses:  
Polycystic Ovarian Syndrome (PCOS).  
Migraine headache.  
Psychiatric disorders biopolar disorder  
Anxiety disorder  
POTS.  
Procedural:  
- Insertion of ear pressure equalization tubes in both ears  
Surgical:  
- Tonsillectomy  
- Tonsillectomy with adenoidectomy  
  
** Social History **  
Environmental Exposure: Secondhand cigarette smoke exposure.  
Behavioral: Not a current tobacco user.  
Tobacco use: Using electronic cigarettes/vaping.  
Alcohol: A social drinker.  
Drug Use: Not using drugs denied by patient.  
Sexual: Sexually active age of sexual partner is _____ years old 21, sexual   
orientation Lesbian or David, gender identity Other, and birth control is being   
practiced Not Using - In Same Sex Relationship.  
  
** Allergies **  
- Abilify Reaction: groggy  
- Augmentin Reaction: Shock  
- Metformin Reaction: Nausea, Vomiting  
- NO KNOWN ENVIRONMENTAL ALLERGIES  
- NO KNOWN FOOD ALLERGIES  
- Sertraline Reaction: slow/groggy  
- Trazodone Hydrochloride Reaction: Panic attack  
  
** Family History **  
Paternal:  
Systemic hypertension  
Oncologic disorder  
Maternal:  
Systemic hypertension  
Epilepsy and recurrent seizures  
Psychiatric disorders  
Oncologic disorder  
Fraternal:  
Psychiatric disorders  
  
** Review Of Systems **  
Head: No head symptoms.  
Neck: No neck symptoms.  
Eyes: No eye symptoms.  
Otolaryngeal: No ear symptoms, no nasal symptoms, no nose and sinus finding, no   
throat symptoms, no oral cavity symptoms, and no jaw symptoms.  
Breasts: No breast symptoms.  
Cardiovascular: No cardiovascular symptoms and no chest pain or discomfort.  
Pulmonary: No pulmonary symptoms.  
Gastrointestinal: No gastrointestinal symptoms.  
Genitourinary: No genitourinary symptoms. Vaginal discharge has been on period x
 3   
months , not heavy and no clotting.  
Musculoskeletal: No musculoskeletal symptoms.  
Neurological: No neurological symptoms.  
Psychological: Anxiety, depression, and sleep.  
Skin: No skin symptoms.  
Cardiovascular: Edema not present.  
  
** Physical Findings **  
- Vitals taken 2024 04:45 pm  
BP-Sitting R151/98 mmHg  
BP Cuff SizeLarge  
Pulse Rate-Uestnzi042 bpm  
Lsmubn19 in  
Ssnaom996 lbs 9.6 oz  
Body Mass Index57.9 kg/m2  
Body Surface Area2.4 m2  
Oxygen Draylzhwwo49 %  
  
- Vitals taken 2024 05:21 pm  
BP-Sitting R154/86 mmHg  
BP Cuff SizeLarge  
  
- Vitals taken 2024 05:46 pm  
manual large  
BP-Sitting R144/84 mmHg  
BP Cuff SizeLarge  
  
Vital Signs:  
- Systolic Blood Pressure > or = 140 mmHg.  
- Diastolic blood pressure 80-89 mmHg.  
General Appearance:  
- Awake. - Alert. - Well developed. - Well nourished. - In no acute distress.  
Lungs:  
- Respiration rhythm and depth was normal. - Clear to auscultation.  
Cardiovascular:  
Heart Rate And Rhythm: - Normal.  
Heart Sounds: - Normal.  
Murmurs: - No murmurs were heard.  
Abdomen:  
Visual Inspection: - Abdomen was normal on visual inspection.  
Musculoskeletal System:  
General/bilateral: - Normal movement of all extremities.  
Skin:  
- General appearance was normal.  
  
** Tests **  
Blood Analysis:  
Hemoglobin Studies: ValueDate  
Blood hemoglobin A1c 7.1%2024  
Blood Endocrine Laboratory Tests: Value  
Blood glucose level by fingerstick Non-fasting 112 mg/dl  
Laboratory-based Chemistry:  
Other Laboratory Tests:  
Screening for sexually transmitted infections was not performed.  
Laboratory Studies:  
Vascular Procedures:  
Right Antecubital Space.  
Left Antecubital Space.  
  
** Assessment **  
- Z68.43 - Body mass index [BMI] 50.0-59.9, adult  
- Z01.812 - Encounter for preprocedural laboratory examination  
- Z23 - Encounter for immunization  
- I10 - Essential (primary) hypertension  
- N94.6 - Dysmenorrhea, unspecified  
- E11.9 - Type 2 diabetes mellitus without complications  
- F41.1 - Generalized anxiety disorder  
  
** Therapy **  
- Immunization administration age 18 or younger, with counseling, first / only   
vaccine component and age 18 or younger, with counseling, each additional 
vaccine   
component.  
  
** Vaccinations **  
- HPV-Gardasil 9 Dose #1 Status: Administered Date: 2024  
- PCV (Prevnar-20) Dose #1 Status: Administered Date: 2024  
  
- Received dose of human papilloma virus vaccine  
- Received dose of pneumococcal conjugate vaccine, 20-valent, IM use  
  
** Counseling/Education **  
- No not wishing to stop using electronic cigarettes/vaping  
- Discussed nutritional needs teach healthy choices including fruits and 
vegetables  
- Patient education about a proper diet  
- Referred Patient to a Diabetes Self-Management Program  
- Discussed concerns about exercise: promote physical activity  
  
** Plan **  
StartCited- Dysmenorrhea, unspecified  
Outside Diagn Tests/Ultrasound: US Transvaginal ([id number])  
Instructions: [place]  
Slynd 4 MG tablet take 1 tablet by mouth at the same time everyday to help 
regulate   
your period, 28 days, 2 refills  
EndCited  
StartCited- Generalized anxiety disorder  
busPIRone HCl 10 MG tablet take 1 tablet by mouth twice daily (d/c 5 mg rx), 30 
days,   
5 refills  
EndCited  
StartCited- Other  
Follow-up Appointment  
1 month med f/u and 2nd hpv  
EndCited  
StartCited- Type 2 diabetes mellitus without complications  
Januvia 50 MG tablet take 1 tablet by mouth once daily, 30 days, 5 refills  
Lisinopril 5 MG tablet take 1 tablet by mouth once daily, 30 days, 5 refills  
Atorvastatin Calcium 20 MG tablet take 1 tablet by mouth once daily at bedtime, 
30   
days, 5 refills  
Blood Glucose System Sriram Kit use to check sugars once daily (use what is 
covered), 30   
days, 0 refills  
Blood Glucose Test strip use to cehck sugars (dispense what is covered), 90 
days, 3   
refills  
Alcohol Pads 70% pad use to cleanse skin prior to checking blood sugars, 90 
days, 3   
refills  
CareSens Lancets each use to check sugars once daily (dispense what is covered),
 90   
days, 3 refills  
EndCited  
Modify diet , limit starchy carbs such as breads / pastas / sweets.  
  
** Other **  
- Patient tolerated venipuncture well; - Number of attempts for venipuncture two  
  
** Practice Management **  
Hemoglobin A1c level >=7.0 and < 8.0%.  
  
** Care Team **  
- Doreen Cabrera CNP  
  
** Health Reminders **  
- Assess BMI satisfied 2024.  
- Assess Tobacco Use satisfied 2024.  
- Follow Up Plan BMI Management satisfied 2024.  
- Hemoglobin A1c satisfied 2024.  
- Statin Therapy Needed satisfied 2024.  
  
** User Defined 1 **  
Not planning a pregnancy in the next year.  
  
  
[facility]- Progress note*   
  
Progress note  
  
  
  
                                Date            Encounter       Last Documented   
by  
   
                                2024       Established Patient Last docu  
mented on 2024; 3:28 PM,   
Leonie HUTCHINSON; [facility]  
  
  
  
                                                      
  
  
** Active Problems & Conditions **  
- F90.9 - Attention-deficit Hyperactivity Disorder  
- F31.31 - Bipolar I Disorder, Most Recent Episode, Depressed Mild  
- E11.9 - Diabetes Mellitus Type 2 Without Complication  
- N94.6 - Dysmenorrhea  
- F43.10 - Post-traumatic Stress Disorder  
  
** Chief Complaint **  
The Chief Complaint is: [facility] met with patient to follow-up regarding mood and   
medications. Patient had requested to meet with a different [facility] in office but 
was not   
avialable and agreeable to meet with this  provider. Patient reports noticing 
that   
anxiety has continued to feel increased recently. Patient reports continued 
stressors   
related to physical health. Patient has scheduled with specialists, but 
appointment   
is not for several weeks still. Patient reports that Buspar is not helpful with   
increased anxiety. Patient reports experiencing nightmares and vivid dreams 
again and   
waking up with increased anxiety. Patient reports no thoughts of harm toward 
self or   
others.  
  
** History of Present Illness **  
Linnette Beckham is a 24 year old female.  
- Appetite not normal - Irritability  
- Anxiety - Nightmares - Energy level is fair - Feeling guilty - Easily 
distracted -   
Racing thoughts - No depression - No loss of interest in activities - No social   
isolation  
  
** Current Medication **  
- Alcohol Pads 70% use to cleanse skin prior to checking blood sugars, 90 days, 
3   
refills  
- ARIPiprazole 5 MG Oral Tablet take 1 tablet by mouth once daily, 30 days, 5 
refills  
- Atorvastatin Calcium 20 MG Oral Tablet take 1 tablet by mouth once daily at   
bedtime, 30 days, 5 refills  
- Blood Glucose System Sriram Kit use to check sugars once daily (use what is 
covered),   
30 days, 0 refills  
- Blood Glucose Test In Vitro Strip use to cehck sugars (dispense what is 
covered),   
90 days, 3 refills  
- busPIRone HCl 10 MG Oral Tablet take 1 tablet by mouth twice daily (d/c 5 mg 
rx),   
30 days, 5 refills  
- CareSens Lancets Miscellaneous use to check sugars once daily (dispense what 
is   
covered), 90 days, 3 refills  
- Intuniv 1 MG Oral Tablet Extended Release 24 Hour take 1 tablet by mouth once 
daily   
at bedtime, 30 days, 5 refills  
- Januvia 50 MG Oral Tablet take 1 tablet by mouth once daily, 30 days, 5 
refills  
- lamoTRIgine 100 MG Oral Tablet take 1 tablet by mouth once daily, 30 days, 5   
refills  
- Lisinopril 5 MG Oral Tablet take 1 tablet by mouth once daily, 30 days, 5 
refills  
- MiraLax 17 GM/SCOOP Oral Powder mix 1 scoop with 8 ounces once daily, 30 days,
 2   
refills  
- Narcan 4 MG/0.1ML Nasal Liquid spray in nostril for symptoms of overdose , may
   
repeat in 2 minutes if symptoms persist, 1 days, 0 refills  
- Prazosin HCl 1 MG Oral Capsule take 1 capsule by mouth twice daily, 30 days, 5
   
refills  
- Sertraline HCl 50 MG Oral Tablet take 1 tablet by mouth once daily, 30 days, 5
   
refills  
- Slynd 4 MG Oral Tablet take 1 tablet by mouth at the same time everyday to 
help   
regulate your period, 28 days, 2 refills  
- traZODone HCl 100 MG Oral Tablet take 1 tablet by mouth twice daily, 30 days, 
5   
refills  
  
** Social History **  
Medical: Chronic illness.  
Environmental Exposure: No secondhand cigarette smoke exposure.  
Personal: Recent emotional stress.  
Behavioral: Not a current tobacco user.  
Tobacco use: Using electronic cigarettes/vaping.  
Alcohol: Not using alcohol.  
Drug Use: Not using drugs.  
  
** Physical Findings **  
General Appearance:  
- Normal Appearance.  
Neurological:  
- Estimated intelligence was normal. - Oriented to time, place, and person. - No
   
hallucinations. - Judgement was not impaired.  
Speech: - Is Normal.  
Psychiatric:  
- Mood was anxious. - Mood was concerned. - Attitude Open.  
Demonstrated Behavior: - Motor Activity Normal Activity. - Eye Contact 
Appropriate.  
Affect: - Congruent with the mood.  
Thought Content: - Insight was intact. - No delusions. - No suicidal ideation. -
 No   
Passive thoughts of Death. - No suicidal plans. - No suicidal intent. - No 
homicidal   
ideations. - No homicidal plans. - No homicidal intent.  
Past Medical:  
- No repetitive self injurious behavior.  
  
** Assessment **  
- F90.9 - Attention-deficit hyperactivity disorder, unspecified type  
- F31.31 - Bipolar disorder, current episode depressed, mild  
- F43.10 - Post-traumatic stress disorder, unspecified  
  
** Therapy **  
- Brief solution-focused therapy.  
- Adherent with medications.  
-  Visit 30 Minutes.  
- Plan - PCP to have patient increase Buspar to 10mg twice daily and continue   
Prazosin 1mg in the morning and increase to 2 mg at bedtime. Patient agreeable 
to   
plan and Collaborated with patient and provider:  
  
** Counseling/Education **  
P offered active and supportive listening and processed recent stressors.  
BHP discussed coping skills and supports to implement in managing increased 
anxiety   
such as tools learned previously in therapy.  
BHP discussed sleep hygiene strategies that can be implemented.  
P encouraged patient to follow-up with specialists as scheduled.  
  
** Plan **  
P to task other  provider to follow-up with patient regarding medication 
changes.  
  
** Care Team **  
- Doreen Cabrera CNP  
  
** Health Reminders **  
- Assess Tobacco Use satisfied 2024.  
  
  
[facility]06- Instructions  
  
Includes: Instructions for all patient encounters  
  
  
  
                                                    Education and Decision Aids   
were provided during visit for:  
   
                                                    Discussed nutritional needs   
teach healthy choices including fruits and   
vegetables  
   
                                                    Last Documented On   
4 4:46PM ; [facility]  
   
                                                    Patient education about a pr  
oper diet  
   
                                                    Last Documented On   
4 4:46PM ; [facility]  
   
                                                    Discussed concerns about exe  
rcise : promote physical activity  
   
                                                    Last Documented On   
4 4:46PM ; [facility]  
   
                                                    Not requesting contraception  
   
                                                    Last Documented On   
4 4:46PM ; [facility] offered active and suppo  
rtive listening and processed current stressors   
related   
to getting medications. ~[facility] discussed coping skills and supports to implement 
in   
daily routine. ~[facility] discussed progress patient has felt they have made recently 
and   
encouraged continued follow-up with providers to address health  
   
                                                    Last Documented On   
4 5:16PM ; [facility]  
   
                                                    Discussed nutritional needs   
teach healthy choices including fruits and   
vegetables  
   
                                                    Last Documented On   
4 7:14PM ; [facility]  
   
                                                    Patient education about a pr  
oper diet  
   
                                                    Last Documented On   
4 7:14PM ; [facility]  
   
                                                    Discussed concerns about exe  
rcise : promote physical activity  
   
                                                    Last Documented On   
4 7:14PM ; [facility]  
   
                                                    Not requesting contraception  
   
                                                    Last Documented On   
4 7:14PM ; [facility]  
   
                                                    Discussed nutritional needs   
teach healthy choices including fruits and   
vegetables  
   
                                                    Last Documented On   
3 5:15PM ; [facility]  
   
                                                    Patient education about a pr  
oper diet  
   
                                                    Last Documented On   
3 5:15PM ; [facility]  
   
                                                    Discussed concerns about exe  
rcise : promote physical activity  
   
                                                    Last Documented On   
3 5:15PM ; [facility]  
   
                                                    Discussed nutritional needs   
teach healthy choices including fruits and   
vegetables  
   
                                                    Last Documented On 10/21/202  
2 1:42PM ; [facility]  
   
                                                    Patient education about a pr  
oper diet  
   
                                                    Last Documented On 10/21/202  
2 1:42PM ; [facility]  
   
                                                    Discussed concerns about exe  
rcise : promote physical activity  
   
                                                    Last Documented On 10/21/202  
2 1:42PM ; [facility]  
 and supportive feedback; normalized emotions and   
feelings, also provided pt time to process any current stressors. ~Promoted and   
encouraged follow-through with scheduling psychiatric services  
   
                                                    Last Documented On   
2 3:21PM ; [facility] provided supportive, empa  
thic listening and reflective feedback. ~Explored,   
encouraged, and supported the pt to discuss current sx/mood, assess risk for   
harm/need, coping mechanisms, support network and safety planning. ~Supported 
pt's   
plan to f/up with Dr. Alonso, as planned at [place], [place]. ~Encouraged 
pt to   
use safety plan, if needed to ensure she remains safe  
   
                                                    Last Documented On   
2 2:27PM ; [facility]  
   
                                                    Discussed nutritional needs   
teach healthy choices including fruits and   
vegetables  
   
                                                    Last Documented On   
2 3:20PM ; [facility]  
   
                                                    Patient education about a pr  
oper diet  
   
                                                    Last Documented On   
2 3:20PM ; [facility]  
   
                                                    Discussed concerns about exe  
rcise : promote physical activity ~ ~Will restart   
trazodone and prazosin ~ ~Patient is planning to have brother stay with her for 
a few   
days for emotional support ~ ~Follow up with PCP at next scheduled visit ~ ~Call
   
psychiatrist office to schedule appt ~ ~Call counselor  
   
                                                    Last Documented On   
2 9:35AM ; [facility]  
   
                                                    Provided supportive listenin  
g and empathic feedback; encouraged, explored, and   
supported the pt as she processed current symptoms, Issues, and concerns. 
~Discussed   
past tx and explored current needs/options. Acknowledged and validated pt's 
thoughts   
and emotions. ~Explored coping mechanisms and support network; utilized 
opportunity   
for safety planning; promoted seeking positive support and seeking help, as 
needed  
   
                                                    Last Documented On   
2 3:28PM ; [facility] provided active listenin  
g, support and helped pt process though current   
symptoms   
and stressor(s). Discussed and explored past effectiveness of medication; 
identified   
objectives and future goals; promoted use of healthy coping mechanisms, and 
self-care   
practices  
   
                                                    Last Documented On   
2 3:19PM ; [facility]  
   
                                                    Reviewed side effects and Ri  
sks/Benefits analysis  
   
                                                    Last Documented On   
2 3:19PM ; [facility]  
   
                                                    Discussed nutritional needs   
teach healthy choices including fruits and   
vegetables  
   
                                                    Last Documented On   
2 3:15PM ; [facility]  
   
                                                    Patient education about a pr  
oper diet  
   
                                                    Last Documented On   
2 3:15PM ; [facility]  
   
                                                    Discussed concerns about exe  
rcise : promote physical activity  
   
                                                    Last Documented On   
2 3:15PM ; [facility] introduced pt to [facility] in  
tegrated model of care ~[facility] offered active listening  
 and   
supportive feedback; normalized emotions and feelings, also provided pt time to   
process any current stressors ~[facility] discussed potential benefits of counseling 
and   
supported re-engaging, as needed. ~[facility] encouraged pt to continue to make time to
   
implement self-care regimen and use coping methods, as needed  
   
                                                    Last Documented On   
2 4:07PM ; [facility]  
   
                                                    Discussed nutritional needs   
teach healthy choices including fruits and   
vegetables  
   
                                                    Last Documented On   
2 3:15PM ; [facility]  
   
                                                    Patient education about a pr  
oper diet  
   
                                                    Last Documented On   
2 3:15PM ; [facility]  
   
                                                    Inquiry and counseling about  
 medication administration and compliance  
   
                                                    Last Documented On   
2 7:37PM ; [facility]  
   
                                                    Discussed concerns about exe  
rcise : promote physical activity  
   
                                                    Last Documented On   
2 3:15PM ; [facility]  
   
                                                    Patient goals discussed  
   
                                                    Last Documented On   
2 7:37PM ; [facility]  
   
                                                    Ansewred pt's questions re B  
orderlline Personality D/O and Bipolar D/O raised by  
   
psychiatrist at [place]. ~Validated and normalized patient?s feelings while   
assisting to process recent events  
   
                                                    Last Documented On   
1 1:33AM ; [facility]  
   
                                                    Discussed nutritional needs   
teach healthy choices including fruits and   
vegetables  
   
                                                    Last Documented On   
1 2:04PM ; [facility]  
   
                                                    Patient education about a pr  
oper diet  
   
                                                    Last Documented On   
1 2:04PM ; [facility]  
   
                                                    Discussed concerns about exe  
rcise : promote physical activity  
   
                                                    Last Documented On   
1 2:04PM ; [facility] provided active listenin  
g, support and helped patient process through   
current   
symptoms and stressors with ongoing mental health concerns and medication 
changes.   
~P discussed coping skills and supports that patient is implementing.  
discussed   
implementing coping skills as discussed with counseling and attending weekly   
appointments as scheduled with counselor. Patient was encouraged to continue 
writing   
down concerns with medications and discuss with providers. ~[facility] reminded patient
 of   
crisis resources should they be needed. Patient reports having crisis resources 
and   
could return to ER  
   
                                                    Last Documented On   
1 10:17AM ; [facility]  
   
                                                    Patient education about a pr  
oper diet  
   
                                                    Last Documented On   
1 5:17PM ; [facility]  
   
                                                    Patient education about meal  
 planning  
   
                                                    Last Documented On   
1 5:17PM ; [facility]  
   
                                                    Education about changing eat  
ing habits  
   
                                                    Last Documented On   
1 5:17PM ; [facility]  
   
                                                    Patient education about high  
 fiber diet  
   
                                                    Last Documented On   
1 5:17PM ; [facility]  
   
                                                    Patient education about low   
fat diet  
   
                                                    Last Documented On   
1 5:17PM ; [facility]  
   
                                                    Patient education about low   
cholesterol diet  
   
                                                    Last Documented On   
1 5:17PM ; [facility]  
   
                                                    Patient education about low   
carbohydrate diet  
   
                                                    Last Documented On   
1 5:17PM ; [facility]  
   
                                                    Patient education about high  
 protein diet  
   
                                                    Last Documented On   
1 5:17PM ; [facility] offered active and suppo  
rtive listening, normalized emotions and feelings,   
and   
processed current stressors. ~[facility] discussed resources for finding a counselor 
and   
provided list of local resources. ~[facility] discussed patients coping skills and 
supports   
and encouraged patient to continue to implement. [facility] reminded patient of crisis
   
resources should they be needed  
   
                                                    Last Documented On   
1 7:15PM ; [facility]  
   
                                                    Discussed nutritional needs   
teach healthy choices including fruits and   
vegetables  
   
                                                    Last Documented On   
1 11:38AM ; [facility]  
   
                                                    Patient education about a pr  
oper diet  
   
                                                    Last Documented On   
1 11:38AM ; [facility]  
   
                                                    Patient education about a pr  
oper diet  
   
                                                    Last Documented On   
1 12:19PM ; [facility]  
   
                                                    Patient education about meal  
 planning  
   
                                                    Last Documented On   
1 12:19PM ; [facility]  
   
                                                    Education about changing eat  
ing habits  
   
                                                    Last Documented On   
1 12:19PM ; [facility]  
   
                                                    Patient education about high  
 fiber diet  
   
                                                    Last Documented On   
1 12:19PM ; [facility]  
   
                                                    Patient education about low   
fat diet  
   
                                                    Last Documented On   
1 12:19PM ; [facility]  
   
                                                    Patient education about low   
cholesterol diet  
   
                                                    Last Documented On   
1 12:19PM ; [facility]  
   
                                                    Patient education about low   
carbohydrate diet  
   
                                                    Last Documented On   
1 12:19PM ; [facility]  
   
                                                    Patient education about high  
 protein diet  
   
                                                    Last Documented On   
1 12:19PM ; [facility]  
   
                                                    Discussed concerns about exe  
rcise : promote physical activity  
   
                                                    Last Documented On   
1 11:38AM ; [facility] provided active listenin  
g, support and helped patient process through   
current   
symptoms and stressors related to family conflict. [facility] discussed coping skills 
and   
supports with patient that can be implemented and reminded patient of ways to 
access   
additional resources. [facility] discussed crisis resources and plan. Patient has 
crisis   
resources still available should they be needed  
   
                                                    Last Documented On 06/10/202  
1 7:04PM ; [facility]  
   
                                                    Discussed nutritional needs   
teach healthy choices including fruits and   
vegetables  
   
                                                    Last Documented On 06/10/202  
1 3:57PM ; [facility]  
   
                                                    Patient education about a pr  
oper diet  
   
                                                    Last Documented On 06/10/202  
1 3:57PM ; [facility]  
   
                                                    Discussed concerns about exe  
rcise : promote physical activity  
   
                                                    Last Documented On 06/10/202  
1 3:57PM ; [facility]. [facility] and PCP reassured   
patient of not sharing information with anyone unless she has signed a release 
for us   
to do so. ~[facility] provided active listening, support and helped patient process 
through   
current symptoms and stressors. ~[facility] discussed establishing counseling and   
psychiatry. [facility] discussed EMDR therapy and ways to find provider who does this 
type   
of therapy. ~P discussed crisis resources should mood worsen, [facility] provided 
text   
hotline number for crisis. P reviewed crisis plan with patient and patient is 
able   
to contact positive supports and family when feeling down  
   
                                                    Last Documented On   
1 11:46AM ; [facility]  
   
                                                    Discussed nutritional needs   
teach healthy choices including fruits and   
vegetables  
   
                                                    Last Documented On   
1 2:11PM ; [facility]  
   
                                                    Patient education about a pr  
oper diet  
   
                                                    Last Documented On   
1 2:11PM ; [facility]  
   
                                                    Discussed concerns about exe  
rcise : promote physical activity  
   
                                                    Last Documented On   
1 2:11PM ; [facility]  
Work Phone: [phone]- Evaluation note  
  
Includes: Assessments for all patient encounters  
  
  
  
                                Findings        Encounter       Date  
   
                                        [D64.9 - Anemia, unspecified] anemia Med  
ical Established Patient with Doreen Cabrera LARISSA                              2024  
  
  
  
                                                    Last Documented On   
4 6:51PM ; [facility]  
  
  
  
                                                    [Z68.43 - Body mass index [B  
MI]   
50.0-59.9, adult] assessment of body   
mass index                              Medical Established Patient with   
Doreen Cabrera CNP                        2024  
  
  
  
                                                    Last Documented On   
4 6:51PM ; [facility]  
  
  
  
                                                    Bipolar affective disorder,   
current   
episode depressed, mild                  Established Patient with Leonie   
Short LISWS                             2024  
  
  
  
                                                    Last Documented On   
4 5:16PM ; [facility]  
  
  
  
                                        Post-traumatic stress disorder  Establ  
ished Patient with Leonie Short   
LISWS                                   2024  
  
  
  
                                                    Last Documented On   
4 5:16PM ; [facility]  
  
  
  
                                                    Undifferentiated attention d  
eficit   
disorder                                 Established Patient with   
Leonie Short LISWS                     2024  
  
  
  
                                                    Last Documented On   
4 5:16PM ; [facility]  
  
  
  
                                        Visit for: screening for disorder BH Est  
ablished Patient with Leonie Garrett LISWS                             2024  
  
  
  
                                                    Last Documented On   
4 5:16PM ; [facility]  
  
  
  
                                                    [Z68.43 - Body mass index [B  
MI]   
50.0-59.9, adult] assessment of body   
mass index                              Medical Established Patient with   
Doreen Cabrera CNP                        2024  
  
  
  
                                                    Last Documented On   
4 7:50PM ; [facility]  
  
  
  
                                        Attention-deficit hyperactivity disorder  
 Medical Established Patient with   
Doreen Cabrera CNP                        2024  
  
  
  
                                                    Last Documented On   
4 7:50PM ; [facility]  
  
  
  
                                                    Diabetes Risk Test Score was  
 three   
score 3/27/2024                         Medical Established Patient with Doreen Cabrera CNP                              2024  
  
  
  
                                                    Last Documented On   
4 7:50PM ; [facility]  
  
  
  
                                        Visit for: screening for STD Medical Est  
ablished Patient with Doreen Cabrera   
CNP                                     2024  
  
  
  
                                                    Last Documented On   
4 7:50PM ; [facility]  
  
  
  
                                                    [Z68.32 - Body mass index [B  
MI]   
32.0-32.9, adult] assessment of body   
mass index                              Medical Established Patient with   
Doreen Cabrera CNP                        2023  
  
  
  
                                                    Last Documented On   
3 6:01PM ; [facility]  
  
  
  
                                        Borderline personality disorder Medical   
Established Patient with Doreen Cabrera CNP                              2023  
  
  
  
                                                    Last Documented On   
3 6:01PM ; [facility]  
  
  
  
                                        Screening for diabetes mellitus Medical   
Established Patient with Doreen Cabrera CNP                              2023  
  
  
  
                                                    Last Documented On   
3 6:01PM ; [facility]  
  
  
  
                                        Assessment of body mass index Medical Es  
tablished Patient with Doreen Cabrera CNP                              10/21/2022  
  
  
  
                                                    Last Documented On 10/21/202  
2 2:45PM ; [facility]  
  
  
  
                                                    Bipolar affective disorder,   
current   
episode manic                            [facility] with Haylie Aguilar LPCC-S                        2022  
  
  
  
                                                    Last Documented On   
2 3:22PM ; [facility]  
  
  
  
                                                    Bipolar affective disorder,   
current   
episode manic                           BH Established Patient with Haylie   
Jeff LPCC-S                        2022  
  
  
  
                                                    Last Documented On   
2 2:28PM ; [facility]  
  
  
  
                                                    Bipolar affective disorder,   
current   
episode depressed, severe with   
psychosis                                [facility] with Haylie Aguilar LPCC-S                        2022  
  
  
  
                                                    Last Documented On   
2 3:29PM ; [facility]  
  
  
  
                                                    Assessment of body mass inde  
x [Body   
mass index [BMI] 50.0-59.9, adult]      Open Access - Established with Julieth   
Aliya CNP                               2022  
  
  
  
                                                    Last Documented On   
2 9:36AM ; [facility]  
  
  
  
                                                    Bipolar I disorder, most rec  
ent   
episode, manic                          Open Access - Established with Julieth   
Aliya CNP                               2022  
  
  
  
                                                    Last Documented On   
2 9:36AM ; [facility]  
  
  
  
                                        Post-traumatic stress disorder  Establ  
ished Patient with Haylienicanor Aguilar   
LPCC-S                                  2022  
  
  
  
                                                    Last Documented On   
2 3:20PM ; [facility]  
  
  
  
                                No cough        Medical Established Patient with  
 Doreen Deborah CNP 2022  
  
  
  
                                                    Last Documented On   
2 4:04PM ; [facility]  
  
  
  
                                                    Z68.43 - Body mass index [BM  
I]   
50.0-59.9, adult                        Medical Established Patient with   
Doreen Deborah CNP                        2022  
  
  
  
                                                    Last Documented On   
2 4:04PM ; [facility]  
  
  
  
                                                    Borderline personality disor  
danny Pt   
reported hx of sx/dx                     Established Patient with Haylie Aguilar LPCC-S                        2022  
  
  
  
                                                    Last Documented On   
2 4:08PM ; [facility]  
  
  
  
                                                    Assessment of body mass inde  
x [Body   
mass index [BMI] 50.0-59.9, adult]      Open Access - Established with Julieth   
Aliya CNP                               2022  
  
  
  
                                                    Last Documented On   
2 7:41PM ; [facility]  
  
  
  
                                                    Diabetes Risk Test Score was  
 three   
score 2022                         Open Access - Established with Julieth   
Aliya CNP                               2022  
  
  
  
                                                    Last Documented On   
2 7:41PM ; [facility]  
  
  
  
                                                    Bipolar I disorder, most rec  
ent   
episode, manic                           Established Patient with Avis Felder LPCC-S                          2021  
  
  
  
                                                    Last Documented On   
1 1:35AM ; [facility]  
  
  
  
                                        Borderline personality disorder  Estab  
lished Patient with Avis   
Felder LPCC-S                          2021  
  
  
  
                                                    Last Documented On   
1 1:35AM ; [facility]  
  
  
  
                                        Post-traumatic stress disorder  Establ  
ished Patient with Avis   
Felder LPCC-S                          2021  
  
  
  
                                                    Last Documented On   
1 1:35AM ; [facility]  
  
  
  
                                                    Assessment of visit for: bhargavi myles for   
human immunodeficiency virus            Medical Established Patient with   
Doreen Deborah CNP                        2021  
  
  
  
                                                    Last Documented On   
1 2:56PM ; [facility]  
  
  
  
                                Nicotine dependence Medical Established Patient   
with Doreen Deborah CNP 2021  
  
  
  
                                                    Last Documented On   
1 2:56PM ; [facility]  
  
  
  
                                Tachycardia     Medical Established Patient with  
 Doreen Deborah CNP 2021  
  
  
  
                                                    Last Documented On   
1 2:56PM ; [facility]  
  
  
  
                                                    Z68.43 - Body mass index [BM  
I]   
50.0-59.9, adult                        Medical Established Patient with   
Doreen Deborah CNP                        2021  
  
  
  
                                                    Last Documented On   
1 2:56PM ; [facility]  
  
  
  
                                                    Bipolar I disorder, most rec  
ent   
episode, manic                           Established Patient with Leonie   
Short LISWS                             2021  
  
  
  
                                                    Last Documented On   
1 10:17AM ; [facility]  
  
  
  
                                                    Borderline personality disor  
danny per   
patient reported history                 Established Patient with Leonie   
Short [facility]WS                             2021  
  
  
  
                                                    Last Documented On   
1 10:17AM ; [facility]  
  
  
  
                                Nicotine dependence  Established Patient with   
Leonie Short LISWS 2021  
  
  
  
                                                    Last Documented On   
1 10:17AM ; [facility]  
  
  
  
                                        Post-traumatic stress disorder  Establ  
ished Patient with Leonie Short   
LISWS                                   2021  
  
  
  
                                                    Last Documented On   
1 10:17AM ; [facility]  
  
  
  
                                Body mass index Medical Established Patient with  
 Doreen Deborah CNP 2021  
  
  
  
                                                    Last Documented On   
1 5:23PM ; [facility]  
  
  
  
                                Morbid obesity  Medical Established Patient with  
 Doreen Deborah CNP 2021  
  
  
  
                                                    Last Documented On   
1 5:23PM ; [facility]  
  
  
  
                                        Nicotine dependence uncomplicated Medica  
l Established Patient with Doreen   
Deborah CNP                              2021  
  
  
  
                                                    Last Documented On   
1 5:23PM ; [facility]  
  
  
  
                                                    Z68.42 - Body mass index [BM  
I]   
45.0-49.9, adult                        Medical Established Patient with   
Doreen Deborah CNP                        2021  
  
  
  
                                                    Last Documented On   
1 5:23PM ; [facility]  
  
  
  
                                Episodic mood disorders On Call with Pamela edwards CNP 2021  
  
  
  
                                                    Last Documented On   
1 7:49AM ; [facility]  
  
  
  
                                                    Mood disorders, NOS per tabatha  
ent   
reported history                         Established Patient with Leonie   
Short [facility]WS                             2021  
  
  
  
                                                    Last Documented On   
1 7:15PM ; [facility]  
  
  
  
                                        Post-traumatic stress disorder  Establ  
ished Patient with Leonie Short   
LISWS                                   2021  
  
  
  
                                                    Last Documented On   
1 7:15PM ; [facility]  
  
  
  
                                Morbid obesity  Medical Established Patient with  
 Doreen Deborah CNP 2021  
  
  
  
                                                    Last Documented On   
1 12:31PM ; [facility]  
  
  
  
                                Otitis externa  Medical Established Patient with  
 Doreen Deborah CNP 2021  
  
  
  
                                                    Last Documented On   
1 12:31PM ; [facility]  
  
  
  
                                                    Z68.42 - Body mass index [BM  
I]   
45.0-49.9, adult                        Medical Established Patient with   
Doreen Deborah CNP                        2021  
  
  
  
                                                    Last Documented On   
1 12:31PM ; [facility]  
  
  
  
                                        Post-traumatic stress disorder  Establ  
ished Patient with Leonie Short   
LISWS                                   06/10/2021  
  
  
  
                                                    Last Documented On 06/10/202  
1 10:05PM ; [facility]  
  
  
  
                                Morbid obesity  Medical Established Patient with  
 Doreen Deborah CNP 06/10/2021  
  
  
  
                                                    Last Documented On 06/10/202  
1 4:45PM ; [facility]  
  
  
  
                                                    Z68.42 - Body mass index [BM  
I]   
45.0-49.9, adult                        Medical Established Patient with   
Doreen Deborah CNP                        06/10/2021  
  
  
  
                                                    Last Documented On 06/10/202  
1 4:45PM ; [facility]  
  
  
  
                                        Post-traumatic stress disorder  Establ  
ished Patient with Leonie Short   
LISWS                                   2021  
  
  
  
                                                    Last Documented On   
1 11:59AM ; [facility]  
  
  
  
                                                    Assessment of visit for: bhargavi myles for   
human immunodeficiency virus            Medical New Patient with Doreen Cabrera CNP                              2021  
  
  
  
                                                    Last Documented On   
1 4:06PM ; [facility]  
  
  
  
                                                    Diabetes Risk Test Score was  
 one score   
2021                               Medical New Patient with Doreen Cabrera CNP                              2021  
  
  
  
                                                    Last Documented On   
1 4:06PM ; [facility]  
  
  
  
                                Hypertension    Medical New Patient with Doreen DAVID esposito CNP 2021  
  
  
  
                                                    Last Documented On   
1 4:06PM ; [facility]  
  
  
  
                                Morbid obesity  Medical New Patient with Doreen C  
cate CNP 2021  
  
  
  
                                                    Last Documented On   
1 4:06PM ; [facility]  
  
  
  
                                Post-traumatic stress disorder Medical New Patie  
nt with Doreen Cabrera CNP   
2021  
  
  
  
                                                    Last Documented On   
1 4:06PM ; [facility]  
  
  
  
                                                    Z68.42 - Body mass index [BM  
I]   
45.0-49.9, adult                        Medical New Patient with Doreen Cabrera CNP                              2021  
  
  
  
                                                    Last Documented On   
1 4:06PM ; [facility]  
Work Phone: [phone]- Evaluation note  
  
Includes: Assessments for all patient encounters  
  
  
  
                                Findings        Encounter       Date  
   
                                                    Attention-deficit hyperactiv  
ity   
disorder                                 Established Patient with Leonie   
Short [facility]WS                             2024  
  
  
  
                                                    Last Documented On   
4 10:10AM ; [facility]  
  
  
  
                                                    Bipolar I disorder, most rec  
ent   
episode, depressed - mild                Established Patient with Leonie   
Short [facility]                             2024  
  
  
  
                                                    Last Documented On   
4 10:10AM ; [facility]  
  
  
  
                                        Post-traumatic stress disorder  Establ  
ished Patient with Leonie Short   
LISWS                                   2024  
  
  
  
                                                    Last Documented On   
4 10:10AM ; [facility]  
  
  
  
                                        [D64.9 - Anemia, unspecified] anemia Med  
ical Established Patient with   
Doreenpaola Cabrera CNP                        2024  
  
  
  
                                                    Last Documented On   
4 6:51PM ; [facility]  
  
  
  
                                                    [Z68.43 - Body mass index [B  
MI]   
50.0-59.9, adult] assessment of body   
mass index                              Medical Established Patient with   
Doreen Cabrera CNP                        2024  
  
  
  
                                                    Last Documented On   
4 6:51PM ; [facility]  
  
  
  
                                                    Bipolar affective disorder,   
current   
episode depressed, mild                  Established Patient with Leonie   
Short [facility]                             2024  
  
  
  
                                                    Last Documented On   
4 5:16PM ; [facility]  
  
  
  
                                        Post-traumatic stress disorder BH Establ  
ished Patient with Leonie Short   
LISWS                                   2024  
  
  
  
                                                    Last Documented On   
4 5:16PM ; [facility]  
  
  
  
                                                    Undifferentiated attention d  
eficit   
disorder                                 Established Patient with   
Leonie Short LISWS                     2024  
  
  
  
                                                    Last Documented On   
4 5:16PM ; [facility]  
  
  
  
                                        Visit for: screening for disorder BH Est  
[facility]                             [facility]  
  
  
  
                                                    Last Documented On   
4 5:16PM ; [facility]  
  
  
  
                                                    [Z68.43 - Body mass index [B  
MI]   
50.0-59.9, adult] assessment of body   
mass index                              Medical Established Patient with   
Doreen Cabrera CNP                        2024  
  
  
  
                                                    Last Documented On   
4 7:50PM ; [facility]  
  
  
  
                                        Attention-deficit hyperactivity disorder  
 Medical Established Patient with   
Doreen Cabrera CNP                        2024  
  
  
  
                                                    Last Documented On   
4 7:50PM ; [facility]  
  
  
  
                                                    Diabetes Risk Test Score was  
 three   
score 3/27/2024                         Medical Established Patient with Doreen Cabrera CNP                              2024  
  
  
  
                                                    Last Documented On   
4 7:50PM ; [facility]  
  
  
  
                                        Visit for: screening for STD Medical Est  
ablished Patient with Doreen Cabrera   
CNP                                     2024  
  
  
  
                                                    Last Documented On   
4 7:50PM ; [facility]  
  
  
  
                                                    [Z68.32 - Body mass index [B  
MI]   
32.0-32.9, adult] assessment of body   
mass index                              Medical Established Patient with   
Doreen Cabrera CNP                        2023  
  
  
  
                                                    Last Documented On   
3 6:01PM ; [facility]  
  
  
  
                                        Borderline personality disorder Medical   
Established Patient with Doreen   
Deborah CNP                              2023  
  
  
  
                                                    Last Documented On   
3 6:01PM ; [facility]  
  
  
  
                                        Screening for diabetes mellitus Medical   
Established Patient with Doreen   
Deborah CNP                              2023  
  
  
  
                                                    Last Documented On   
3 6:01PM ; [facility]  
  
  
  
                                        Assessment of body mass index Medical Es  
tablished Patient with Doreen   
Deborah CNP                              10/21/2022  
  
  
  
                                                    Last Documented On 10/21/202  
2 2:45PM ; [facility]  
  
  
  
                                                    Bipolar affective disorder,   
current   
episode manic                            Telebehavioral Health with Haylie   
Aguilar LPCC-S                        2022  
  
  
  
                                                    Last Documented On   
2 3:22PM ; [facility]  
  
  
  
                                                    Bipolar affective disorder,   
current   
episode manic                            Established Patient with Haylie   
Aguilar LPCC-S                        2022  
  
  
  
                                                    Last Documented On   
2 2:28PM ; [facility]  
  
  
  
                                                    Bipolar affective disorder,   
current   
episode depressed, severe with   
psychosis                                Telebehavioral Health with Haylie   
Aguilar LPCC-S                        2022  
  
  
  
                                                    Last Documented On   
2 3:29PM ; [facility]  
  
  
  
                                                    Assessment of body mass inde  
x [Body   
mass index [BMI] 50.0-59.9, adult]      Open Access - Established with Julieth   
Aliya CNP                               2022  
  
  
  
                                                    Last Documented On   
2 9:36AM ; [facility]  
  
  
  
                                                    Bipolar I disorder, most rec  
ent   
episode, manic                          Open Access - Established with Julieth   
Aliya CNP                               2022  
  
  
  
                                                    Last Documented On   
2 9:36AM ; [facility]  
  
  
  
                                        Post-traumatic stress disorder BH Establ  
ished Patient with Haylie Aguilar   
LPCC-S                                  2022  
  
  
  
                                                    Last Documented On   
2 3:20PM ; [facility]  
  
  
  
                                No cough        Medical Established Patient with  
 Doreen Deborah CNP 2022  
  
  
  
                                                    Last Documented On   
2 4:04PM ; [facility]  
  
  
  
                                                    Z68.43 - Body mass index [BM  
I]   
50.0-59.9, adult                        Medical Established Patient with   
Doreen Deborah CNP                        2022  
  
  
  
                                                    Last Documented On   
2 4:04PM ; [facility]  
  
  
  
                                                    Borderline personality disor  
danny Pt   
reported hx of sx/dx                     [facility]                        [facility]  
  
  
  
                                                    Last Documented On   
2 4:08PM ; [facility]  
  
  
  
                                                    Assessment of body mass inde  
x [Body   
mass index [BMI] 50.0-59.9, adult]      Open Access - Established with Julieth   
Aliya CNP                               2022  
  
  
  
                                                    Last Documented On   
2 7:41PM ; [facility]  
  
  
  
                                                    Diabetes Risk Test Score was  
 three   
score 2022                         Open Access - Established with Julieth   
Aliya CNP                               2022  
  
  
  
                                                    Last Documented On   
2 7:41PM ; [facility]  
  
  
  
                                                    Bipolar I disorder, most rec  
ent   
episode, manic                           [facility]   
[facility]                          [facility]  
  
  
  
                                                    Last Documented On   
1 1:35AM ; [facility]  
  
  
  
                                        Borderline personality disorder  Estab  
lished Patient [facility]   
[facility]                          [facility]  
  
  
  
                                                    Last Documented On   
1 1:35AM ; [facility]  
  
  
  
                                        Post-traumatic stress disorder  Establ  
ished Patient [facility]   
[facility]                          [facility]  
  
  
  
                                                    Last Documented On   
1 1:35AM ; [facility]  
  
  
  
                                                    Assessment of visit for: bhargavi guevaraning for   
human immunodeficiency virus            Medical Established Patient with   
Doreen Deborah CNP                        2021  
  
  
  
                                                    Last Documented On   
1 2:56PM ; [facility]  
  
  
  
                                Nicotine dependence Medical Established Patient   
with Doreen Deborah CNP 2021  
  
  
  
                                                    Last Documented On   
1 2:56PM ; [facility]  
  
  
  
                                Tachycardia     Medical Established Patient with  
 Doreen Deborah CNP 2021  
  
  
  
                                                    Last Documented On   
1 2:56PM ; [facility]  
  
  
  
                                                    Z68.43 - Body mass index [BM  
I]   
50.0-59.9, adult                        Medical Established Patient with   
Doreen Deborah CNP                        2021  
  
  
  
                                                    Last Documented On   
1 2:56PM ; [facility]  
  
  
  
                                                    Bipolar I disorder, most rec  
ent   
episode, manic                           Established Patient with Leonie   
Short [facility]                             2021  
  
  
  
                                                    Last Documented On   
1 10:17AM ; [facility]  
  
  
  
                                                    Borderline personality disor  
danny per   
patient reported history                 Established Patient with Leonie   
Short [facility]                             2021  
  
  
  
                                                    Last Documented On   
1 10:17AM ; [facility]  
  
  
  
                                Nicotine dependence BH Established Patient with   
Leonie Short [facility] 2021  
  
  
  
                                                    Last Documented On   
1 10:17AM ; [facility]  
  
  
  
                                        Post-traumatic stress disorder  Establ  
ished Patient with Leonie Short   
LISWS                                   2021  
  
  
  
                                                    Last Documented On   
1 10:17AM ; [facility]  
  
  
  
                                Body mass index Medical Established Patient with  
 Doreen Deborah CNP 2021  
  
  
  
                                                    Last Documented On   
1 5:23PM ; [facility]  
  
  
  
                                Morbid obesity  Medical Established Patient with  
 Doreen Deborah CNP 2021  
  
  
  
                                                    Last Documented On   
1 5:23PM ; [facility]  
  
  
  
                                        Nicotine dependence uncomplicated Medica  
l Established Patient with Doreen   
Deborah CNP                              2021  
  
  
  
                                                    Last Documented On   
1 5:23PM ; [facility]  
  
  
  
                                                    Z68.42 - Body mass index [BM  
I]   
45.0-49.9, adult                        Medical Established Patient with   
Doreen Deborah CNP                        2021  
  
  
  
                                                    Last Documented On   
1 5:23PM ; [facility]  
  
  
  
                                Episodic mood disorders On Call with Pamela edwards CNP 2021  
  
  
  
                                                    Last Documented On   
1 7:49AM ; [facility]  
  
  
  
                                                    Mood disorders, NOS per tabatha  
ent   
reported history                         Established Patient with Leonie   
Short LISWS                             2021  
  
  
  
                                                    Last Documented On   
1 7:15PM ; [facility]  
  
  
  
                                        Post-traumatic stress disorder  Establ  
ished Patient with Leonie Short   
LISWS                                   2021  
  
  
  
                                                    Last Documented On   
1 7:15PM ; [facility]  
  
  
  
                                Morbid obesity  Medical Established Patient with  
 Doreen Deborah CNP 2021  
  
  
  
                                                    Last Documented On   
1 12:31PM ; [facility]  
  
  
  
                                Otitis externa  Medical Established Patient with  
 Doreen Deborah CNP 2021  
  
  
  
                                                    Last Documented On   
1 12:31PM ; [facility]  
  
  
  
                                                    Z68.42 - Body mass index [BM  
I]   
45.0-49.9, adult                        Medical Established Patient with   
Doreen Deborah CNP                        2021  
  
  
  
                                                    Last Documented On   
1 12:31PM ; [facility]  
  
  
  
                                        Post-traumatic stress disorder  Establ  
ished Patient with Leonie Short   
LISWS                                   06/10/2021  
  
  
  
                                                    Last Documented On 06/10/202  
1 10:05PM ; [facility]  
  
  
  
                                Morbid obesity  Medical Established Patient with  
 Doreen Deborah CNP 06/10/2021  
  
  
  
                                                    Last Documented On 06/10/202  
1 4:45PM ; [facility]  
  
  
  
                                                    Z68.42 - Body mass index [BM  
I]   
45.0-49.9, adult                        Medical Established Patient with   
Doreen Deborah CNP                        06/10/2021  
  
  
  
                                                    Last Documented On 06/10/202  
1 4:45PM ; [facility]  
  
  
  
                                        Post-traumatic stress disorder  Establ  
ished Patient with Leonie Short   
LISWS                                   2021  
  
  
  
                                                    Last Documented On   
1 11:59AM ; [facility]  
  
  
  
                                                    Assessment of visit for: bhargavi guevaraning for   
human immunodeficiency virus            Medical New Patient with Doreen   
Deborah CNP                              2021  
  
  
  
                                                    Last Documented On   
1 4:06PM ; [facility]  
  
  
  
                                                    Diabetes Risk Test Score was  
 one score   
2021                               Medical New Patient with Doreen   
Deborah CNP                              2021  
  
  
  
                                                    Last Documented On   
1 4:06PM ; [facility]  
  
  
  
                                Hypertension    Medical New Patient with Doreen C  
cate CNP 2021  
  
  
  
                                                    Last Documented On   
1 4:06PM ; [facility]  
  
  
  
                                Morbid obesity  Medical New Patient with Doreen C  
cate CNP 2021  
  
  
  
                                                    Last Documented On   
1 4:06PM ; [facility]  
  
  
  
                                Post-traumatic stress disorder Medical New Patie  
nt with Doreen Deborah CNP   
2021  
  
  
  
                                                    Last Documented On   
1 4:06PM ; [facility]  
  
  
  
                                                    Z68.42 - Body mass index [BM  
I]   
45.0-49.9, adult                        Medical New Patient with Doreen   
Deborah CNP                              2021  
  
  
  
                                                    Last Documented On   
1 4:06PM ; [facility]  
Work Phone: [phone]- History general Narrative - Reported  
  
Includes: Medical History in patient's chart  
  
  
  
                                        Description         Last Updated  
   
                                        POTS                2024  
  
  
  
                                                    Last Documented On   
4 6:51PM ; [facility]  
  
  
  
                                        0 previous live birth(s) 2024  
  
  
  
                                                    Last Documented On   
4 7:50PM ; [facility]  
  
  
  
                                        Previously pregnant 1 time(s) 2024  
  
  
  
                                                    Last Documented On   
4 7:50PM ; [facility]  
  
  
  
                                        Recent immunization for flu 2024  
  
  
  
                                                    Last Documented On   
4 7:50PM ; [facility]  
  
  
  
                                        Has sex without a condom 2022  
  
  
  
                                                    Last Documented On   
2 4:04PM ; [facility]  
  
  
  
                                        Not planning to have a baby in the next   
12 months 2022  
  
  
  
                                                    Last Documented On   
2 4:04PM ; [facility]  
  
  
  
                                        Partners sexually transmitted infection   
status known 2022  
  
  
  
                                                    Last Documented On   
2 4:04PM ; [facility]  
  
  
  
                                        No previous hospitalizations 2022  
  
  
  
                                                    Last Documented On   
2 4:04PM ; [facility]  
  
  
  
                                        Chronic illness     2021  
  
  
  
                                                    Last Documented On   
1 7:49AM ; [facility]  
  
  
  
                                        Exposure to COVID-19 2021  
  
  
  
                                                    Last Documented On   
1 12:31PM ; [facility]  
  
  
  
                                        History of gynecologic disorder 20  
21  
  
  
  
                                                    Last Documented On   
1 4:06PM ; [facility]  
  
  
  
                                        History of Polycystic Ovarian Syndrome (  
PCOS) 2021  
  
  
  
                                                    Last Documented On   
1 4:06PM ; [facility]  
  
  
  
                                        History of anxiety disorder NOS 20  
21  
  
  
  
                                                    Last Documented On   
1 4:06PM ; [facility]  
  
  
  
                                        History of migraine headache 2021  
  
  
  
                                                    Last Documented On   
1 4:06PM ; [facility]  
  
  
  
                                        History of psychiatric disorders biopola  
r disorder 2021  
  
  
  
                                                    Last Documented On   
1 4:06PM ; [facility]  
Work Phone: [phone]- History general Narrative - Reported  
  
Includes: Medical History in patient's chart  
  
  
  
                                        Description         Last Updated  
   
                                        POTS                2024  
  
  
  
                                                    Last Documented On   
4 6:51PM ; [facility]  
  
  
  
                                        0 previous live birth(s) 2024  
  
  
  
                                                    Last Documented On   
4 7:50PM ; [facility]  
  
  
  
                                        Previously pregnant 1 time(s) 2024  
  
  
  
                                                    Last Documented On   
4 7:50PM ; [facility]  
  
  
  
                                        Recent immunization for flu 2024  
  
  
  
                                                    Last Documented On   
4 7:50PM ; [facility]  
  
  
  
                                        Has sex without a condom 2022  
  
  
  
                                                    Last Documented On   
2 4:04PM ; [facility]  
  
  
  
                                        Not planning to have a baby in the next   
12 months 2022  
  
  
  
                                                    Last Documented On   
2 4:04PM ; [facility]  
  
  
  
                                        Partners sexually transmitted infection   
status known 2022  
  
  
  
                                                    Last Documented On   
2 4:04PM ; [facility]  
  
  
  
                                        No previous hospitalizations 2022  
  
  
  
                                                    Last Documented On   
2 4:04PM ; [facility]  
  
  
  
                                        Chronic illness     2021  
  
  
  
                                                    Last Documented On   
1 7:49AM ; [facility]  
  
  
  
                                        Exposure to COVID-19 2021  
  
  
  
                                                    Last Documented On   
1 12:31PM ; [facility]  
  
  
  
                                        History of gynecologic disorder 20  
21  
  
  
  
                                                    Last Documented On   
1 4:06PM ; [facility]  
  
  
  
                                        History of Polycystic Ovarian Syndrome (  
PCOS) 2021  
  
  
  
                                                    Last Documented On   
1 4:06PM ; [facility]  
  
  
  
                                        History of anxiety disorder NOS 20  
21  
  
  
  
                                                    Last Documented On   
1 4:06PM ; [facility]  
  
  
  
                                        History of migraine headache 2021  
  
  
  
                                                    Last Documented On   
1 4:06PM ; [facility]  
  
  
  
                                        History of psychiatric disorders biopola  
r disorder 2021  
  
  
  
                                                    Last Documented On   
1 4:06PM ; [facility]  
Work Phone: [phone]- History general Narrative - Reported  
  
Includes: Medical History in patient's chart  
  
  
  
                                        Description         Last Updated  
   
                                        POTS                2024  
  
  
  
                                                    Last Documented On   
4 6:51PM ; [facility]  
  
  
  
                                        0 previous live birth(s) 2024  
  
  
  
                                                    Last Documented On   
4 7:50PM ; [facility]  
  
  
  
                                        Previously pregnant 1 time(s) 2024  
  
  
  
                                                    Last Documented On   
4 7:50PM ; [facility]  
  
  
  
                                        Recent immunization for flu 2024  
  
  
  
                                                    Last Documented On   
4 7:50PM ; [facility]  
  
  
  
                                        Has sex without a condom 2022  
  
  
  
                                                    Last Documented On   
2 4:04PM ; [facility]  
  
  
  
                                        Not planning to have a baby in the next   
12 months 2022  
  
  
  
                                                    Last Documented On   
2 4:04PM ; [facility]  
  
  
  
                                        Partners sexually transmitted infection   
status known 2022  
  
  
  
                                                    Last Documented On   
2 4:04PM ; [facility]  
  
  
  
                                        No previous hospitalizations 2022  
  
  
  
                                                    Last Documented On   
2 4:04PM ; [facility]  
  
  
  
                                        Chronic illness     2021  
  
  
  
                                                    Last Documented On   
1 7:49AM ; [facility]  
  
  
  
                                        Exposure to COVID-19 2021  
  
  
  
                                                    Last Documented On   
1 12:31PM ; [facility]  
  
  
  
                                        History of gynecologic disorder 20  
21  
  
  
  
                                                    Last Documented On   
1 4:06PM ; [facility]  
  
  
  
                                        History of Polycystic Ovarian Syndrome (  
PCOS) 2021  
  
  
  
                                                    Last Documented On   
1 4:06PM ; [facility]  
  
  
  
                                        History of anxiety disorder NOS 20  
21  
  
  
  
                                                    Last Documented On   
1 4:06PM ; [facility]  
  
  
  
                                        History of migraine headache 2021  
  
  
  
                                                    Last Documented On   
1 4:06PM ; [facility]  
  
  
  
                                        History of psychiatric disorders biopola  
r disorder 2021  
  
  
  
                                                    Last Documented On   
1 4:06PM ; [facility]  
Work Phone: [phone]- History general Narrative - Reported  
  
Includes: Medical History in patient's chart  
  
  
  
                                        Description         Last Updated  
   
                                        POTS                2024  
  
  
  
                                                    Last Documented On   
4 6:51PM ; [facility]  
  
  
  
                                        0 previous live birth(s) 2024  
  
  
  
                                                    Last Documented On   
4 7:50PM ; [facility]  
  
  
  
                                        Previously pregnant 1 time(s) 2024  
  
  
  
                                                    Last Documented On   
4 7:50PM ; [facility]  
  
  
  
                                        Recent immunization for flu 2024  
  
  
  
                                                    Last Documented On   
4 7:50PM ; [facility]  
  
  
  
                                        Has sex without a condom 2022  
  
  
  
                                                    Last Documented On   
2 4:04PM ; [facility]  
  
  
  
                                        Not planning to have a baby in the next   
12 months 2022  
  
  
  
                                                    Last Documented On   
2 4:04PM ; [facility]  
  
  
  
                                        Partners sexually transmitted infection   
status known 2022  
  
  
  
                                                    Last Documented On   
2 4:04PM ; [facility]  
  
  
  
                                        No previous hospitalizations 2022  
  
  
  
                                                    Last Documented On   
2 4:04PM ; [facility]  
  
  
  
                                        Chronic illness     2021  
  
  
  
                                                    Last Documented On   
1 7:49AM ; [facility]  
  
  
  
                                        Exposure to COVID-19 2021  
  
  
  
                                                    Last Documented On   
1 12:31PM ; [facility]  
  
  
  
                                        History of gynecologic disorder 20  
21  
  
  
  
                                                    Last Documented On   
1 4:06PM ; [facility]  
  
  
  
                                        History of Polycystic Ovarian Syndrome (  
PCOS) 2021  
  
  
  
                                                    Last Documented On   
1 4:06PM ; [facility]  
  
  
  
                                        History of anxiety disorder NOS 20  
21  
  
  
  
                                                    Last Documented On   
1 4:06PM ; [facility]  
  
  
  
                                        History of migraine headache 2021  
  
  
  
                                                    Last Documented On   
1 4:06PM ; [facility]  
  
  
  
                                        History of psychiatric disorders biopola  
r disorder 2021  
  
  
  
                                                    Last Documented On   
1 4:06PM ; [facility]  
Work Phone: [phone]- Progress note*   
  
Progress note  
  
  
  
                                Date            Encounter       Last Documented   
by  
   
                                2024      Medical Established Patient Last  
 documented on 2024; 6:51 PM,   
Doreen Cabrera CNP; [facility]  
  
  
  
                                                      
  
  
** Active Problems & Conditions **  
- F90.9 - Attention-deficit Hyperactivity Disorder  
- F31.31 - Bipolar I Disorder, Most Recent Episode, Depressed Mild  
- F43.10 - Post-traumatic Stress Disorder  
  
** Chief Complaint **  
The Chief Complaint is: Patient is not seeing [facility] any longer and wants to
   
discuss metformin. Patient is still taking med, but it is still getting diarrhea
 and   
nausea. Patient is wanting to get into [facility]. She has tried to contact Dr. Patterson 
in   
[place], but has not heard back. Patient provided with phone number for [facility].  
Patient here for 2 month med check. Patient is taking meds as prescribed. 
Patient is   
still feeling anxiousness at times.  
Patient refused HPV and PCV.  
  
** Referred Here **  
Not referred by urgent care clinic and not the emergency room. No prior 
encounters.  
- Data to be reviewed: no clinical lab tests  
  
** History of Present Illness **  
Linnette Beckham is a 24 year old female.  
- Allergy list reviewed - Reviewed Medications - Medication list reviewed  
- Date of last menstruation 2024 - Pregnancy test - would not like a 
pregnancy   
test  
Presents with sig other  Tyesha  for med f/u . wants to get a new gyn . reports 
no   
longer doing counseling r/t  she said I dont need it  labs reviewed from last 
month   
ER visit and I explained her cbc shows anemia , likely r/t heavy periods . I 
would   
like them to get further labs as pt does admit to fatigue / sleeping 14 hours 
per day  
  
** Current Medication **  
- ARIPiprazole 5 MG Oral Tablet take 1 tablet by mouth once daily, 30 days, 5 
refills  
- busPIRone HCl 5 MG Oral Tablet take 1 tablet by mouth twice daily, 30 days, 5   
refills  
- Intuniv 1 MG Oral Tablet Extended Release 24 Hour take 1 tablet by mouth once 
daily   
at bedtime, 30 days, 5 refills  
- lamoTRIgine 100 MG Oral Tablet take 1 tablet by mouth once daily, 30 days, 5   
refills  
- MiraLax 17 GM/SCOOP Oral Powder mix 1 scoop with 8 ounces once daily, 30 days,
 2   
refills  
- Narcan 4 MG/0.1ML Nasal Liquid spray in nostril for symptoms of overdose , may
   
repeat in 2 minutes if symptoms persist, 1 days, 0 refills  
- Prazosin HCl 1 MG Oral Capsule take 1 capsule by mouth twice daily, 30 days, 5
   
refills  
- Sertraline HCl 50 MG Oral Tablet take 1 tablet by mouth once daily, 30 days, 5
   
refills  
- traZODone HCl 100 MG Oral Tablet take 1 tablet by mouth twice daily, 30 days, 
5   
refills  
  
** Past Medical/Surgical History **  
Reported:  
Has sex without a condom.  
Medical: No previous hospitalizations. Chronic illness and Sexually transmitted   
infection Partners sexually transmitted infection status known.  
Immunization History: Recent immunization for flu.  
Exposure: Exposure to COVID-19.  
Pregnancy: Previously pregnant 1 time(s) and para having 0 live birth(s). Not   
planning a pregnancy in the next year.  
Diagnoses:  
Polycystic Ovarian Syndrome (PCOS).  
Migraine headache.  
Psychiatric disorders biopolar disorder  
Anxiety disorder NOS  
POTS.  
Procedural:  
- Insertion of ear pressure equalization tubes in both ears  
Surgical:  
- Tonsillectomy  
- Tonsillectomy with adenoidectomy  
  
** Social History **  
Environmental Exposure: Secondhand cigarette smoke exposure.  
Behavioral: Not a current tobacco user.  
Tobacco use: Using electronic cigarettes/vaping.  
Alcohol: Not using alcohol.  
Drug Use: Not using drugs denied by patient.  
Sexual: Sexually active age of sexual partner is _____ years old 21, sexual   
orientation Lesbian or David, gender identity Other, and birth control is being   
practiced Not Using - In Same Sex Relationship.  
  
** Allergies **  
- Abilify Reaction: groggy  
- Augmentin Reaction: Shock  
- Metformin Reaction: Nausea, Vomiting  
- NO KNOWN ENVIRONMENTAL ALLERGIES  
- NO KNOWN FOOD ALLERGIES  
- Sertraline Reaction: slow/groggy  
- Trazodone Hydrochloride Reaction: Panic attack  
  
** Family History **  
Paternal:  
Systemic hypertension  
Oncologic disorder  
Maternal:  
Systemic hypertension  
Epilepsy and recurrent seizures  
Psychiatric disorders  
Oncologic disorder  
Fraternal:  
Psychiatric disorders  
  
** Review Of Systems **  
Head: No head symptoms.  
Neck: No neck symptoms.  
Eyes: No eye symptoms.  
Otolaryngeal: No ear symptoms, no nasal symptoms, no nose and sinus finding, no   
throat symptoms, no oral cavity symptoms, and no jaw symptoms.  
Breasts: No breast symptoms.  
Cardiovascular: No cardiovascular symptoms and no chest pain or discomfort.  
Pulmonary: No pulmonary symptoms.  
Gastrointestinal: No gastrointestinal symptoms.  
Genitourinary: No genitourinary symptoms. Obstetrics and gynecology: heavy 
periods.  
Musculoskeletal: No musculoskeletal symptoms.  
Neurological: No neurological symptoms.  
Psychological: No psychological symptoms.  
Skin: No skin symptoms.  
Cardiovascular: Edema not present.  
  
** Physical Findings **  
- Vitals taken 2024 04:39 pm  
BP-Sitting L161/94 mmHg  
BP Cuff SizeLarge  
Pulse Rate-Xggqevc15 bpm  
Sryvpi28 in  
Avgaip071 lbs  
Body Mass Index54.6 kg/m2  
Body Surface Area2.4 m2  
Oxygen Uxzxgildtk09 %  
  
- Vitals taken 2024 04:50 pm  
BP-Sitting L137/89 mmHg  
BP Cuff SizeLarge  
Pulse Rate-Qtrgugf72 bpm  
  
Vital Signs:  
- Systolic blood pressure 130 - 139 mmHg.  
- Diastolic blood pressure 80-89 mmHg.  
General Appearance:  
- Awake. - Alert. - Well developed. - Well nourished. - In no acute distress.  
Lungs:  
- Respiration rhythm and depth was normal. - Clear to auscultation.  
Cardiovascular:  
Heart Rate And Rhythm: - Normal.  
Heart Sounds: - Normal.  
Murmurs: - No murmurs were heard.  
Abdomen:  
Visual Inspection: - Abdomen was normal on visual inspection.  
Musculoskeletal System:  
General/bilateral: - Normal movement of all extremities.  
Skin:  
- General appearance was normal.  
  
** Tests **  
Laboratory-based Chemistry:  
Other Laboratory Tests:  
Screening for sexually transmitted infections was not performed.  
  
** Assessment **  
- Z68.43 - Body mass index [BMI] 50.0-59.9, adult  
- D64.9 - Anemia, unspecified  
  
** Counseling/Education **  
- Does not want to stop using current contraception  
- Wishing to stop using electronic cigarettes/vaping  
- Discussed nutritional needs teach healthy choices including fruits and 
vegetables  
- Patient education about a proper diet  
- Not requesting contraception  
- Discussed concerns about exercise: promote physical activity  
  
** Plan **  
StartCited- Anemia, unspecified  
Lab: Iron and TIBC  
Lab: Ferritin  
Lab: CBC With Differential/Platelet  
Lab: TSH+T4F+T3Free  
Lab: Thyroid Antibodies  
EndCited  
StartCited- Other  
Follow-up Appointment  
2 month med check  
EndCited  
# given to [facility] , call michael win appt asap . labs asap to further 
check   
on anemia.  
  
** Care Team **  
- Doreen Cabrera CNP  
  
** Health Reminders **  
- Assess BMI satisfied 2024.  
- Assess Tobacco Use satisfied 2024.  
- Follow Up Plan BMI Management satisfied 2024.  
  
** User Defined 1 **  
Not planning a pregnancy in the next year.  
  
  
[facility]- Progress note*   
  
Progress note  
  
  
  
                                Date            Encounter       Last Documented   
by  
   
                                2024       Established Patient Last docu  
mented on 2024; 10:10 AM,   
Leonie HUTCHINSON; [facility]  
  
  
  
                                                      
  
  
** Active Problems & Conditions **  
- F90.9 - Attention-deficit Hyperactivity Disorder  
- F31.31 - Bipolar I Disorder, Most Recent Episode, Depressed Mild  
- F43.10 - Post-traumatic Stress Disorder  
  
** Chief Complaint **  
The Chief Complaint is: [facility] met with patient to follow-up regarding mood and PHQ
   
score of 2 and ESTHELA score of 6. Patient reports noticing increased anxiety,   
overthinking and catastrophizing recently. Patient reports in the past, summers 
have   
been difficult and noticed a decline in mood around this time of year. Patient   
reports having several positive things ongoing in life at this time. Patient 
reports   
stressors related to physical health. Patient reports no thoughts of harm toward
 self   
or others.  
  
** History of Present Illness **  
Linnette Beckham is a 24 year old female.  
- Normal appetite - Irritability  
- Anxiety with persistent worry - With anticipation of misfortune to self or 
others -   
Sleep disturbances as patient reports sleeping too much (around 14-16 hours) - 
Energy   
level is fair - Feeling guilty - Racing thoughts - No depression - No loss of   
interest in activities - Not easily distracted - No social isolation - No 
impulsive   
behavior  
  
** Current Medication **  
- ARIPiprazole 5 MG Oral Tablet take 1 tablet by mouth once daily, 30 days, 5 
refills  
- busPIRone HCl 5 MG Oral Tablet take 1 tablet by mouth twice daily, 30 days, 5   
refills  
- Intuniv 1 MG Oral Tablet Extended Release 24 Hour take 1 tablet by mouth once 
daily   
at bedtime, 30 days, 5 refills  
- lamoTRIgine 100 MG Oral Tablet take 1 tablet by mouth once daily, 30 days, 5   
refills  
- MiraLax 17 GM/SCOOP Oral Powder mix 1 scoop with 8 ounces once daily, 30 days,
 2   
refills  
- Narcan 4 MG/0.1ML Nasal Liquid spray in nostril for symptoms of overdose , may
   
repeat in 2 minutes if symptoms persist, 1 days, 0 refills  
- Prazosin HCl 1 MG Oral Capsule take 1 capsule by mouth twice daily, 30 days, 5
   
refills  
- Sertraline HCl 50 MG Oral Tablet take 1 tablet by mouth once daily, 30 days, 5
   
refills  
- traZODone HCl 100 MG Oral Tablet take 1 tablet by mouth twice daily, 30 days, 
5   
refills  
  
** Social History **  
Medical: Chronic illness with POTS and PCOS.  
Environmental Exposure: No secondhand cigarette smoke exposure.  
Personal: Family problems and recent emotional stress.  
Behavioral: Not a current tobacco user.  
Tobacco use: Using electronic cigarettes/vaping.  
Alcohol: Not using alcohol.  
Drug Use: Not using drugs.  
  
** Physical Findings **  
General Appearance:  
- Normal Appearance.  
Neurological:  
- Estimated intelligence was normal. - Oriented to time, place, and person. - No
   
hallucinations. - Judgement was not impaired.  
Speech: - Is Normal.  
Psychiatric:  
- Mood is Euthymic. - Attitude Open.  
Demonstrated Behavior: - Motor Activity Normal Activity. - Eye Contact 
Appropriate.  
Affect: - Congruent with the mood.  
Thought Content: - Insight was intact. - No delusions. - No suicidal ideation. -
 No   
Passive thoughts of Death. - No suicidal plans. - No suicidal intent. - No 
homicidal   
ideations. - No homicidal plans. - No homicidal intent.  
Past Medical:  
- No repetitive self injurious behavior.  
  
** Assessment **  
- F90.9 - Attention-deficit hyperactivity disorder, unspecified type  
- F31.31 - Bipolar disorder, current episode depressed, mild  
- F43.10 - Post-traumatic stress disorder, unspecified  
  
** Therapy **  
- Developmental/Behavioral Screening & Testing - PHQ9 and 
Developmental/Behavioral   
Screening & Testing - GAD7.  
- Brief solution-focused therapy.  
- Adherent with medications.  
-  Visit 30 Minutes.  
- Plan - do not modify medication at this time. Patient would like to see if   
addressing physical health concerns helps before changing medications. 
Collaborated   
with patient and provider:  
  
** Counseling/Education **  
P offered active and supportive listening and processed current stressors.  
P discussed coping skills and supports that can be implemented to manage 
increased   
anxiety and stressors. BHP discussed potential of resuming counseling, even if 
for   
monthly visits to process ongoing stressors.  
BHP encouraged patient to follow-up on referrals as discussed with PCP.  
  
** Plan **  
BHP to attempt to follow-up with patient via phone in 2 weeks and at next in 
person   
visit as scheduled.  
  
** Care Team **  
- Doreen Cabrera CNP  
  
** Health Reminders **  
- Assess Tobacco Use satisfied 2024.  
- ESTHELA-2 satisfied 2024.  
- PHQ9 / PHQA satisfied 2024.  
  
** User Defined 1 **  
ESTHELA-2 score was three 2024, ESTHELA-7 score was six [ESTHELA-7] Feeling nervous, 
anxious   
or on edge? + 2 pt : More than half the days, [ESTHELA-7] Feeling nervous, anxious 
or on   
edge? + 2 pt : More than half the days, [ESTHELA-7] Not being able to stop or 
control   
worrying? + 1 pt : Several days, [ESTHELA-7] Not being able to stop or control 
worrying?   
+ 1 pt : Several days, [ESTHELA-7] Worrying too much about different things? + 1 pt 
:   
Several days, [ESTHELA-7] Trouble relaxing? + 0 pt : Not at all, [ESTHELA-7] Being so   
restless that it's hard to sit still? + 0 pt : Not at all, [ESTHELA-7] Becoming 
easily   
annoyed or irritable? + 1 pt : Several days, [ESTHELA-7] Feeling afraid as if 
something   
awful might happen? + 1 pt : Several days, Patient Health Questionnaire 9-Item 
total   
score was two 2024 If you checked off problems, how difficult is it for you
to   
do your work? + : Somewhat difficult, [PHQ-9-1] Little interest or pleasure in 
doing   
things? + 0 pt : Not at all, [PHQ-9-2] Feeling down, depressed, or hopeless? + 0
 pt :   
Not at all, [PHQ-9-3] Trouble falling or staying asleep or sleeping too much? + 
1 pt   
: Several days, [PHQ-9-4] Feeling tired or having little energy? + 0 pt : Not at
 all,   
[PHQ-9-5] Poor appetite or overeating? + 0 pt : Not at all, [PHQ-9-6] Feeling 
bad   
about yourself-or that you are a failure + 1 pt : Several days, [PHQ-9-7] 
Trouble   
concentrating on things such as reading the newspaper + 0 pt : Not at all, 
[PHQ-9-8]   
Moving or speaking so slowly that other people have noticed. + 0 pt : Not at 
all,   
[PHQ-9-9] Thoughts that you would be better off dead or hurting yourself? + 0 pt
 :   
Not at all, and ESTHELA If you checked off problems, how difficult is it for you to 
do   
your work, take care of things, or get along? Somewhat difficult.  
  
  
[facility]- Progress note*   
  
Progress note  
  
  
  
                                Date            Encounter       Last Documented   
by  
   
                                2024      Chart Update    Last documented   
on 2024; 9:32 AM, Doreen Cabrera CNP;   
[facility]  
  
  
  
                                                      
  
  
** Active Problems & Conditions **  
- F90.9 - Attention-deficit Hyperactivity Disorder  
- F31.31 - Bipolar I Disorder, Most Recent Episode, Depressed Mild  
- F43.10 - Post-traumatic Stress Disorder  
  
** Current Medication **  
- ARIPiprazole 5 MG Oral Tablet take 1 tablet by mouth once daily, 30 days, 5 
refills  
- busPIRone HCl 5 MG Oral Tablet take 1 tablet by mouth twice daily, 30 days, 5   
refills  
- Intuniv 1 MG Oral Tablet Extended Release 24 Hour take 1 tablet by mouth once 
daily   
at bedtime, 30 days, 5 refills  
- lamoTRIgine 100 MG Oral Tablet take 1 tablet by mouth once daily, 30 days, 5   
refills  
- metFORMIN HCl 500 MG Oral Tablet AM and PM per GYN/NOMS in [place], 30 days, 0
   
refills  
- MiraLax 17 GM/SCOOP Oral Powder mix 1 scoop with 8 ounces once daily, 30 days,
 2   
refills  
- Narcan 4 MG/0.1ML Nasal Liquid spray in nostril for symptoms of overdose , may
   
repeat in 2 minutes if symptoms persist, 1 days, 0 refills  
- Prazosin HCl 1 MG Oral Capsule take 1 capsule by mouth twice daily, 30 days, 5
   
refills  
- Sertraline HCl 50 MG Oral Tablet take 1 tablet by mouth once daily, 30 days, 5
   
refills  
- traZODone HCl 100 MG Oral Tablet take 1 tablet by mouth twice daily, 30 days, 
5   
refills  
  
** Past Medical/Surgical History **  
Reported:  
Has sex without a condom.  
Medical: No previous hospitalizations. Chronic illness and Sexually transmitted   
infection Partners sexually transmitted infection status known.  
Immunization History: Recent immunization for flu.  
Exposure: Exposure to COVID-19.  
Pregnancy: Previously pregnant 1 time(s) and para having 0 live birth(s). Not   
planning a pregnancy in the next year.  
Diagnoses:  
Polycystic Ovarian Syndrome (PCOS).  
Migraine headache.  
Psychiatric disorders biopolar disorder  
Anxiety disorder NOS  
Procedural:  
- Insertion of ear pressure equalization tubes in both ears  
Surgical:  
- Tonsillectomy  
- Tonsillectomy with adenoidectomy  
  
** Allergies **  
- Abilify Reaction: groggy  
- Augmentin Reaction: Shock  
- metformin Reaction: Nausea, Vomiting  
- trazodone Reaction: Panic attack  
- Zoloft Reaction: slow/ groggy  
  
** Family History **  
Paternal:  
Systemic hypertension  
Oncologic disorder  
Maternal:  
Systemic hypertension  
Epilepsy and recurrent seizures  
Psychiatric disorders  
Oncologic disorder  
Fraternal:  
Psychiatric disorders  
  
** Tests **  
Physician's Services:  
- Outside Chlamydia Test was Negative 3/27/2024  
  
** Care Team **  
- Doreen Cabrera CNP  
  
  
[facility]- Evaluation note  
  
Includes: Assessments for all patient encounters  
  
  
  
                                Findings        Encounter       Date  
   
                                                    Bipolar affective disorder,   
current   
episode depressed, mild                  Established Patient with Leonie   
Short [facility]                             2024  
  
  
  
                                                    Last Documented On   
4 7:46PM ; [facility]  
  
  
  
                                        Post-traumatic stress disorder  Establ  
ished Patient with Leonie Short   
LISWS                                   2024  
  
  
  
                                                    Last Documented On   
4 7:46PM ; [facility]  
  
  
  
                                                    Undifferentiated attention d  
eficit   
disorder                                 Established Patient with   
Leonie Short [facility]                     2024  
  
  
  
                                                    Last Documented On   
4 7:46PM ; [facility]  
  
  
  
                                        Visit for: screening for disorder  Est  
ablished Patient with Leonie   
Short [facility]                             2024  
  
  
  
                                                    Last Documented On   
4 7:46PM ; [facility]  
  
  
  
                                                    [Z68.43 - Body mass index [B  
MI]   
50.0-59.9, adult] assessment of body   
mass index                              Medical Established Patient with   
Doreen Cabrera CNP                        2024  
  
  
  
                                                    Last Documented On   
4 7:50PM ; [facility]  
  
  
  
                                        Attention-deficit hyperactivity disorder  
 Medical Established Patient with   
Doreen Cabrera CNP                        2024  
  
  
  
                                                    Last Documented On   
4 7:50PM ; [facility]  
  
  
  
                                                    Diabetes Risk Test Score was  
 three   
score 3/27/2024                         Medical Established Patient with Doreen Cabrera CNP                              2024  
  
  
  
                                                    Last Documented On   
4 7:50PM ; [facility]  
  
  
  
                                        Visit for: screening for STD Medical Est  
ablished Patient with Doreen Cabrera   
CNP                                     2024  
  
  
  
                                                    Last Documented On   
4 7:50PM ; [facility]  
  
  
  
                                                    [Z68.32 - Body mass index [B  
MI]   
32.0-32.9, adult] assessment of body   
mass index                              Medical Established Patient with   
Doreen Cabrera CNP                        2023  
  
  
  
                                                    Last Documented On   
3 6:01PM ; [facility]  
  
  
  
                                        Borderline personality disorder Medical   
Established Patient with Doreen Cabrera CNP                              2023  
  
  
  
                                                    Last Documented On   
3 6:01PM ; [facility]  
  
  
  
                                        Screening for diabetes mellitus Medical   
Established Patient with Doreen Cabrera CNP                              2023  
  
  
  
                                                    Last Documented On   
3 6:01PM ; [facility]  
  
  
  
                                        Assessment of body mass index Medical Es  
tablished Patient with Doreen Cabrera CNP                              10/21/2022  
  
  
  
                                                    Last Documented On 10/21/202  
2 2:45PM ; [facility]  
  
  
  
                                                    Bipolar affective disorder,   
current   
episode manic                            [facility]                        [facility]  
  
  
  
                                                    Last Documented On   
2 3:22PM ; [facility]  
  
  
  
                                                    Bipolar affective disorder,   
current   
episode manic                            Established Patient with Haylienicanor Martinerson [facility]                        2022  
  
  
  
                                                    Last Documented On   
2 2:28PM ; [facility]  
  
  
  
                                                    Bipolar affective disorder,   
current   
episode depressed, severe with   
psychosis                                Telebehavioral Health with Haylienicanor Aguilar LPCC-S                        2022  
  
  
  
                                                    Last Documented On   
2 3:29PM ; [facility]  
  
  
  
                                                    Assessment of body mass inde  
x [Body   
mass index [BMI] 50.0-59.9, adult]      Open Access - Established with Julieth Pisano CNP                               2022  
  
  
  
                                                    Last Documented On   
2 9:36AM ; [facility]  
  
  
  
                                                    Bipolar I disorder, most rec  
ent   
episode, manic                          Open Access - Established with Julieth   
Aliya CNP                               2022  
  
  
  
                                                    Last Documented On   
2 9:36AM ; [facility]  
  
  
  
                                        Post-traumatic stress disorder  Establ  
ished Patient with Haylienicanor MartinAguilar   
LP[facility]-S                                  2022  
  
  
  
                                                    Last Documented On   
2 3:20PM ; [facility]  
  
  
  
                                No cough        Medical Established Patient with  
 Doreen Deborah CNP 2022  
  
  
  
                                                    Last Documented On   
2 4:04PM ; [facility]  
  
  
  
                                                    Z68.43 - Body mass index [BM  
I]   
50.0-59.9, adult                        Medical Established Patient with   
Doreen Deborah CNP                        2022  
  
  
  
                                                    Last Documented On   
2 4:04PM ; [facility]  
  
  
  
                                                    Borderline personality disor  
danny Pt   
reported hx of sx/dx                     Established Patient with Haylienicanor Martinerson LP[facility]S                        2022  
  
  
  
                                                    Last Documented On   
2 4:08PM ; [facility]  
  
  
  
                                                    Assessment of body mass inde  
x [Body   
mass index [BMI] 50.0-59.9, adult]      Open Access - Established with Julieth Pisano CNP                               2022  
  
  
  
                                                    Last Documented On   
2 7:41PM ; [facility]  
  
  
  
                                                    Diabetes Risk Test Score was  
 three   
score 2022                         Open Access - Established with Julieth Pisano CNP                               2022  
  
  
  
                                                    Last Documented On   
2 7:41PM ; [facility]  
  
  
  
                                                    Bipolar I disorder, most rec  
ent   
episode, manic                          BH Established Patient with Avis   
Felder LP[facility]S                          2021  
  
  
  
                                                    Last Documented On   
1 1:35AM ; [facility]  
  
  
  
                                        Borderline personality disorder  Estab  
lished Patient with Avis   
Felder LPCC-S                          2021  
  
  
  
                                                    Last Documented On   
1 1:35AM ; [facility]  
  
  
  
                                        Post-traumatic stress disorder  Establ  
ished Patient with Avis   
Felder LPCC-S                          2021  
  
  
  
                                                    Last Documented On   
1 1:35AM ; [facility]  
  
  
  
                                                    Assessment of visit for: bhargavi guevaraning for   
human immunodeficiency virus            Medical Established Patient with   
Doreen Deborah CNP                        2021  
  
  
  
                                                    Last Documented On   
1 2:56PM ; [facility]  
  
  
  
                                Nicotine dependence Medical Established Patient   
with Doreen Deborah CNP 2021  
  
  
  
                                                    Last Documented On   
1 2:56PM ; [facility]  
  
  
  
                                Tachycardia     Medical Established Patient with  
 Doreen Deborah CNP 2021  
  
  
  
                                                    Last Documented On   
1 2:56PM ; [facility]  
  
  
  
                                                    Z68.43 - Body mass index [BM  
I]   
50.0-59.9, adult                        Medical Established Patient with   
Doreen Deborah CNP                        2021  
  
  
  
                                                    Last Documented On   
1 2:56PM ; [facility]  
  
  
  
                                                    Bipolar I disorder, most rec  
ent   
episode, manic                           Established Patient with Leonie   
Short [facility]                             2021  
  
  
  
                                                    Last Documented On   
1 10:17AM ; [facility]  
  
  
  
                                                    Borderline personality disor  
danny per   
patient reported history                 Established Patient with Leonie   
Short [facility]                             2021  
  
  
  
                                                    Last Documented On   
1 10:17AM ; [facility]  
  
  
  
                                Nicotine dependence  Established Patient with   
Leonie Short [facility]WS 2021  
  
  
  
                                                    Last Documented On   
1 10:17AM ; [facility]  
  
  
  
                                        Post-traumatic stress disorder  Establ  
ished Patient with Leonie Short   
LISWS                                   2021  
  
  
  
                                                    Last Documented On   
1 10:17AM ; [facility]  
  
  
  
                                Body mass index Medical Established Patient with  
 Doreen Deborah CNP 2021  
  
  
  
                                                    Last Documented On   
1 5:23PM ; [facility]  
  
  
  
                                Morbid obesity  Medical Established Patient with  
 Doreen Deborah CNP 2021  
  
  
  
                                                    Last Documented On   
1 5:23PM ; [facility]  
  
  
  
                                        Nicotine dependence uncomplicated Medica  
l Established Patient with Doreen   
Deborah CNP                              2021  
  
  
  
                                                    Last Documented On   
1 5:23PM ; [facility]  
  
  
  
                                                    Z68.42 - Body mass index [BM  
I]   
45.0-49.9, adult                        Medical Established Patient with   
Doreen Deborah CNP                        2021  
  
  
  
                                                    Last Documented On   
1 5:23PM ; [facility]  
  
  
  
                                Episodic mood disorders On Call with Pamela edwards CNP 2021  
  
  
  
                                                    Last Documented On   
1 7:49AM ; [facility]  
  
  
  
                                                    Mood disorders, NOS per tabatha  
ent   
reported history                         Established Patient with Leonie   
Short [facility]                             2021  
  
  
  
                                                    Last Documented On   
1 7:15PM ; [facility]  
  
  
  
                                        Post-traumatic stress disorder  Establ  
ished Patient with Leonie Short   
LISWS                                   2021  
  
  
  
                                                    Last Documented On   
1 7:15PM ; [facility]  
  
  
  
                                Morbid obesity  Medical Established Patient with  
 Doreen Deborah CNP 2021  
  
  
  
                                                    Last Documented On   
1 12:31PM ; [facility]  
  
  
  
                                Otitis externa  Medical Established Patient with  
 Doreen Deborah CNP 2021  
  
  
  
                                                    Last Documented On   
1 12:31PM ; [facility]  
  
  
  
                                                    Z68.42 - Body mass index [BM  
I]   
45.0-49.9, adult                        Medical Established Patient with   
Doreen Deborah CNP                        2021  
  
  
  
                                                    Last Documented On   
1 12:31PM ; [facility]  
  
  
  
                                        Post-traumatic stress disorder  Establ  
ished Patient with Leonie Short   
LISWS                                   06/10/2021  
  
  
  
                                                    Last Documented On 06/10/202  
1 10:05PM ; [facility]  
  
  
  
                                Morbid obesity  Medical Established Patient with  
 Doreen Deborah CNP 06/10/2021  
  
  
  
                                                    Last Documented On 06/10/202  
1 4:45PM ; [facility]  
  
  
  
                                                    Z68.42 - Body mass index [BM  
I]   
45.0-49.9, adult                        Medical Established Patient with   
Doeren Deborah CNP                        06/10/2021  
  
  
  
                                                    Last Documented On 06/10/202  
1 4:45PM ; [facility]  
  
  
  
                                        Post-traumatic stress disorder  Establ  
ished Patient with Leonie Short   
LISWS                                   2021  
  
  
  
                                                    Last Documented On   
1 11:59AM ; [facility]  
  
  
  
                                                    Assessment of visit for: scr  
eening for   
human immunodeficiency virus            Medical New Patient with Doreenpaola Cabrera CNP                              2021  
  
  
  
                                                    Last Documented On   
1 4:06PM ; [facility]  
  
  
  
                                                    Diabetes Risk Test Score was  
 one score   
2021                               Medical New Patient with Doreen Cabrera CNP                              2021  
  
  
  
                                                    Last Documented On   
1 4:06PM ; [facility]  
  
  
  
                                Hypertension    Medical New Patient with Doreen esposito CNP 2021  
  
  
  
                                                    Last Documented On   
1 4:06PM ; [facility]  
  
  
  
                                Morbid obesity  Medical New Patient with Doreenpaola esposito CNP 2021  
  
  
  
                                                    Last Documented On   
1 4:06PM ; [facility]  
  
  
  
                                Post-traumatic stress disorder Medical New Patie  
nt with Doreenpaola Mccormacken CNP   
2021  
  
  
  
                                                    Last Documented On   
1 4:06PM ; [facility]  
  
  
  
                                                    Z68.42 - Body mass index [BM  
I]   
45.0-49.9, adult                        Medical New Patient with Doreen Cabrera CNP                              2021  
  
  
  
                                                    Last Documented On   
1 4:06PM ; [facility]  
Work Phone: [phone]- Evaluation note  
  
Includes: Assessments for all patient encounters  
  
  
  
                                Findings        Encounter       Date  
   
                                                    Bipolar affective disorder,   
current   
episode depressed, mild                  Established Patient with Leonie   
Short LISWS                             2024  
  
  
  
                                                    Last Documented On   
4 5:16PM ; [facility]  
  
  
  
                                        Post-traumatic stress disorder BH Establ  
ished Patient with Leonie Short   
LISWS                                   2024  
  
  
  
                                                    Last Documented On   
4 5:16PM ; [facility]  
  
  
  
                                                    Undifferentiated attention d  
eficit   
disorder                                 Established Patient with   
Leonie Short LISWS                     2024  
  
  
  
                                                    Last Documented On   
4 5:16PM ; [facility]  
  
  
  
                                        Visit for: screening for disorder BH Est  
ablished Patient with Leonie   
Short LISWS                             2024  
  
  
  
                                                    Last Documented On   
4 5:16PM ; [facility]  
  
  
  
                                                    [Z68.43 - Body mass index [B  
MI]   
50.0-59.9, adult] assessment of body   
mass index                              Medical Established Patient with   
Doreenpaola Cabrera CNP                        2024  
  
  
  
                                                    Last Documented On   
4 7:50PM ; [facility]  
  
  
  
                                        Attention-deficit hyperactivity disorder  
 Medical Established Patient with   
Doreen Deborah CNP                        2024  
  
  
  
                                                    Last Documented On   
4 7:50PM ; [facility]  
  
  
  
                                                    Diabetes Risk Test Score was  
 three   
score 3/27/2024                         Medical Established Patient with Doreen Cabrera CNP                              2024  
  
  
  
                                                    Last Documented On   
4 7:50PM ; [facility]  
  
  
  
                                        Visit for: screening for STD Medical Est  
ablished Patient with Doreen Cabrera   
CNP                                     2024  
  
  
  
                                                    Last Documented On   
4 7:50PM ; [facility]  
  
  
  
                                                    [Z68.32 - Body mass index [B  
MI]   
32.0-32.9, adult] assessment of body   
mass index                              Medical Established Patient with   
Doreen Cabrera CNP                        2023  
  
  
  
                                                    Last Documented On   
3 6:01PM ; [facility]  
  
  
  
                                        Borderline personality disorder Medical   
Established Patient with Doreen Cabrera CNP                              2023  
  
  
  
                                                    Last Documented On   
3 6:01PM ; [facility]  
  
  
  
                                        Screening for diabetes mellitus Medical   
Established Patient with Doreen Cabrera CNP                              2023  
  
  
  
                                                    Last Documented On   
3 6:01PM ; [facility]  
  
  
  
                                        Assessment of body mass index Medical Es  
tablished Patient with Doreen Cabrera CNP                              10/21/2022  
  
  
  
                                                    Last Documented On 10/21/202  
2 2:45PM ; [facility]  
  
  
  
                                                    Bipolar affective disorder,   
current   
episode manic                            [facility] with Haylienicanor Martinerson LPCC-S                        2022  
  
  
  
                                                    Last Documented On   
2 3:22PM ; [facility]  
  
  
  
                                                    Bipolar affective disorder,   
current   
episode manic                            Established Patient with Haylie   
Aguilar LPCC-S                        2022  
  
  
  
                                                    Last Documented On   
2 2:28PM ; [facility]  
  
  
  
                                                    Bipolar affective disorder,   
current   
episode depressed, severe with   
psychosis                                Telebehavioral Health with Haylie   
Aguilar LPCC-S                        2022  
  
  
  
                                                    Last Documented On   
2 3:29PM ; [facility]  
  
  
  
                                                    Assessment of body mass inde  
x [Body   
mass index [BMI] 50.0-59.9, adult]      Open Access - Established with Julieth Pisano CNP                               2022  
  
  
  
                                                    Last Documented On   
2 9:36AM ; [facility]  
  
  
  
                                                    Bipolar I disorder, most rec  
ent   
episode, manic                          Open Access - Established with Julieth   
Aliya CNP                               2022  
  
  
  
                                                    Last Documented On   
2 9:36AM ; [facility]  
  
  
  
                                        Post-traumatic stress disorder  Establ  
ished Patient with Haylie Aguilar   
LPCC-S                                  2022  
  
  
  
                                                    Last Documented On   
2 3:20PM ; [facility]  
  
  
  
                                No cough        Medical Established Patient with  
 Doreenpaola Cabrera CNP 2022  
  
  
  
                                                    Last Documented On   
2 4:04PM ; [facility]  
  
  
  
                                                    Z68.43 - Body mass index [BM  
I]   
50.0-59.9, adult                        Medical Established Patient with   
Doreen Cabrera CNP                        2022  
  
  
  
                                                    Last Documented On   
2 4:04PM ; [facility]  
  
  
  
                                                    Borderline personality disor  
danny Pt   
reported hx of sx/dx                     Established Patient with Haylienicanor Aguilar LPCC-S                        2022  
  
  
  
                                                    Last Documented On   
2 4:08PM ; [facility]  
  
  
  
                                                    Assessment of body mass inde  
x [Body   
mass index [BMI] 50.0-59.9, adult]      Open Access - Established with Julieth   
Aliya CNP                               2022  
  
  
  
                                                    Last Documented On   
2 7:41PM ; [facility]  
  
  
  
                                                    Diabetes Risk Test Score was  
 three   
score 2022                         Open Access - Established with Julieth   
Aliya CNP                               2022  
  
  
  
                                                    Last Documented On   
2 7:41PM ; [facility]  
  
  
  
                                                    Bipolar I disorder, most rec  
ent   
episode, manic                           Established Patient with Avis   
Felder LPCC-S                          2021  
  
  
  
                                                    Last Documented On   
1 1:35AM ; [facility]  
  
  
  
                                        Borderline personality disorder  Estab  
lished Patient with Avis   
Felder LPCC-S                          2021  
  
  
  
                                                    Last Documented On   
1 1:35AM ; [facility]  
  
  
  
                                        Post-traumatic stress disorder  Establ  
ished Patient with Avis   
Felder LPCC-S                          2021  
  
  
  
                                                    Last Documented On   
1 1:35AM ; [facility]  
  
  
  
                                                    Assessment of visit for: scr  
eening for   
human immunodeficiency virus            Medical Established Patient with   
Doreenpaola Cabrera CNP                        2021  
  
  
  
                                                    Last Documented On   
1 2:56PM ; [facility]  
  
  
  
                                Nicotine dependence Medical Established Patient   
with Doreen Deborah CNP 2021  
  
  
  
                                                    Last Documented On   
1 2:56PM ; [facility]  
  
  
  
                                Tachycardia     Medical Established Patient with  
 Doreen Deborah CNP 2021  
  
  
  
                                                    Last Documented On   
1 2:56PM ; [facility]  
  
  
  
                                                    Z68.43 - Body mass index [BM  
I]   
50.0-59.9, adult                        Medical Established Patient with   
Doreen Deborah CNP                        2021  
  
  
  
                                                    Last Documented On   
1 2:56PM ; [facility]  
  
  
  
                                                    Bipolar I disorder, most rec  
ent   
episode, manic                           Established Patient with Leonie   
Short LISWS                             2021  
  
  
  
                                                    Last Documented On   
1 10:17AM ; [facility]  
  
  
  
                                                    Borderline personality disor  
danny per   
patient reported history                 Established Patient with Leonie   
Short LISWS                             2021  
  
  
  
                                                    Last Documented On   
1 10:17AM ; [facility]  
  
  
  
                                Nicotine dependence  Established Patient with   
Leonie Short LISWS 2021  
  
  
  
                                                    Last Documented On   
1 10:17AM ; [facility]  
  
  
  
                                        Post-traumatic stress disorder  Establ  
ished Patient with Leonie Short   
LISWS                                   2021  
  
  
  
                                                    Last Documented On   
1 10:17AM ; [facility]  
  
  
  
                                Body mass index Medical Established Patient with  
 Doreen Deborah CNP 2021  
  
  
  
                                                    Last Documented On   
1 5:23PM ; [facility]  
  
  
  
                                Morbid obesity  Medical Established Patient with  
 Doreen Deborah CNP 2021  
  
  
  
                                                    Last Documented On   
1 5:23PM ; [facility]  
  
  
  
                                        Nicotine dependence uncomplicated Medica  
l Established Patient with Doreen   
Deborah CNP                              2021  
  
  
  
                                                    Last Documented On   
1 5:23PM ; [facility]  
  
  
  
                                                    Z68.42 - Body mass index [BM  
I]   
45.0-49.9, adult                        Medical Established Patient with   
Doreen Deborah CNP                        2021  
  
  
  
                                                    Last Documented On   
1 5:23PM ; [facility]  
  
  
  
                                Episodic mood disorders On Call with Pamela edwards CNP 2021  
  
  
  
                                                    Last Documented On   
1 7:49AM ; [facility]  
  
  
  
                                                    Mood disorders, NOS per tabatha  
ent   
reported history                         Established Patient with Leonie   
Short LISWS                             2021  
  
  
  
                                                    Last Documented On   
1 7:15PM ; [facility]  
  
  
  
                                        Post-traumatic stress disorder  Establ  
ished Patient with Leonie Short   
LISWS                                   2021  
  
  
  
                                                    Last Documented On   
1 7:15PM ; [facility]  
  
  
  
                                Morbid obesity  Medical Established Patient with  
 Doreen Deborah CNP 2021  
  
  
  
                                                    Last Documented On   
1 12:31PM ; [facility]  
  
  
  
                                Otitis externa  Medical Established Patient with  
 Doreen Deborah CNP 2021  
  
  
  
                                                    Last Documented On   
1 12:31PM ; [facility]  
  
  
  
                                                    Z68.42 - Body mass index [BM  
I]   
45.0-49.9, adult                        Medical Established Patient with   
Doreen Deborah CNP                        2021  
  
  
  
                                                    Last Documented On   
1 12:31PM ; [facility]  
  
  
  
                                        Post-traumatic stress disorder  Establ  
ished Patient with Leonie Short   
LISWS                                   06/10/2021  
  
  
  
                                                    Last Documented On 06/10/202  
1 10:05PM ; [facility]  
  
  
  
                                Morbid obesity  Medical Established Patient with  
 Doreen Deborah CNP 06/10/2021  
  
  
  
                                                    Last Documented On 06/10/202  
1 4:45PM ; [facility]  
  
  
  
                                                    Z68.42 - Body mass index [BM  
I]   
45.0-49.9, adult                        Medical Established Patient with   
Doreenpaola Cabrera CNP                        06/10/2021  
  
  
  
                                                    Last Documented On 06/10/202  
1 4:45PM ; [facility]  
  
  
  
                                        Post-traumatic stress disorder  Establ  
ished Patient with Leonie Short   
LISWS                                   2021  
  
  
  
                                                    Last Documented On   
1 11:59AM ; [facility]  
  
  
  
                                                    Assessment of visit for: scr  
eening for   
human immunodeficiency virus            Medical New Patient with Doreenpaola Cabrera CNP                              2021  
  
  
  
                                                    Last Documented On   
1 4:06PM ; [facility]  
  
  
  
                                                    Diabetes Risk Test Score was  
 one score   
2021                               Medical New Patient with Doreen Cabrera CNP                              2021  
  
  
  
                                                    Last Documented On   
1 4:06PM ; [facility]  
  
  
  
                                Hypertension    Medical New Patient with Doreenpaola esposito CNP 2021  
  
  
  
                                                    Last Documented On   
1 4:06PM ; [facility]  
  
  
  
                                Morbid obesity  Medical New Patient with Doreenpaola esposito CNP 2021  
  
  
  
                                                    Last Documented On   
1 4:06PM ; [facility]  
  
  
  
                                Post-traumatic stress disorder Medical New Patie  
nt with Doreenpaola Cabrera CNP   
2021  
  
  
  
                                                    Last Documented On   
1 4:06PM ; [facility]  
  
  
  
                                                    Z68.42 - Body mass index [BM  
I]   
45.0-49.9, adult                        Medical New Patient with Doreen Cabrera CNP                              2021  
  
  
  
                                                    Last Documented On   
1 4:06PM ; [facility]  
Work Phone: [phone]- Evaluation note  
  
Includes: Assessments for all patient encounters  
  
  
  
                                Findings        Encounter       Date  
   
                                                    Bipolar affective disorder,   
current   
episode depressed, mild                  Established Patient with Leonie   
Short LISWS                             2024  
  
  
  
                                                    Last Documented On   
4 5:16PM ; [facility]  
  
  
  
                                        Post-traumatic stress disorder  Establ  
ished Patient with Leonie Short   
LISWS                                   2024  
  
  
  
                                                    Last Documented On   
4 5:16PM ; [facility]  
  
  
  
                                                    Undifferentiated attention d  
eficit   
disorder                                 Established Patient with   
Leonie Short LISWS                     2024  
  
  
  
                                                    Last Documented On   
4 5:16PM ; [facility]  
  
  
  
                                        Visit for: screening for disorder  Est  
ablished Patient with Leonie   
Short LISWS                             2024  
  
  
  
                                                    Last Documented On   
4 5:16PM ; [facility]  
  
  
  
                                                    [Z68.43 - Body mass index [B  
MI]   
50.0-59.9, adult] assessment of body   
mass index                              Medical Established Patient with   
Doreen Cabrera CNP                        2024  
  
  
  
                                                    Last Documented On   
4 7:50PM ; [facility]  
  
  
  
                                        Attention-deficit hyperactivity disorder  
 Medical Established Patient with   
Doreen Deborah CNP                        2024  
  
  
  
                                                    Last Documented On   
4 7:50PM ; [facility]  
  
  
  
                                                    Diabetes Risk Test Score was  
 three   
score 3/27/2024                         Medical Established Patient with Doreenpaola Cabrera CNP                              2024  
  
  
  
                                                    Last Documented On   
4 7:50PM ; [facility]  
  
  
  
                                        Visit for: screening for STD Medical Est  
ablished Patient with Doreen Cabrera   
CNP                                     2024  
  
  
  
                                                    Last Documented On   
4 7:50PM ; [facility]  
  
  
  
                                                    [Z68.32 - Body mass index [B  
MI]   
32.0-32.9, adult] assessment of body   
mass index                              Medical Established Patient with   
Doreen Cabrera CNP                        2023  
  
  
  
                                                    Last Documented On   
3 6:01PM ; [facility]  
  
  
  
                                        Borderline personality disorder Medical   
Established Patient with Doreen Cabrera CNP                              2023  
  
  
  
                                                    Last Documented On   
3 6:01PM ; [facility]  
  
  
  
                                        Screening for diabetes mellitus Medical   
Established Patient with Doreen Cabrera CNP                              2023  
  
  
  
                                                    Last Documented On   
3 6:01PM ; [facility]  
  
  
  
                                        Assessment of body mass index Medical Es  
tablished Patient with Doreen Cabrera CNP                              10/21/2022  
  
  
  
                                                    Last Documented On 10/21/202  
2 2:45PM ; [facility]  
  
  
  
                                                    Bipolar affective disorder,   
current   
episode manic                            Tele[facility]al [facility] with Haylie   
Aguilar LPCC-S                        2022  
  
  
  
                                                    Last Documented On   
2 3:22PM ; [facility]  
  
  
  
                                                    Bipolar affective disorder,   
current   
episode manic                            Established Patient with Haylie   
Aguilar LPCC-S                        2022  
  
  
  
                                                    Last Documented On   
2 2:28PM ; [facility]  
  
  
  
                                                    Bipolar affective disorder,   
current   
episode depressed, severe with   
psychosis                                Telebehavioral Health with Haylie   
Aguilar LPCC-S                        2022  
  
  
  
                                                    Last Documented On   
2 3:29PM ; [facility]  
  
  
  
                                                    Assessment of body mass inde  
x [Body   
mass index [BMI] 50.0-59.9, adult]      Open Access - Established with Julieth   
Aliya CNP                               2022  
  
  
  
                                                    Last Documented On   
2 9:36AM ; [facility]  
  
  
  
                                                    Bipolar I disorder, most rec  
ent   
episode, manic                          Open Access - Established with Julieth   
Aliya CNP                               2022  
  
  
  
                                                    Last Documented On   
2 9:36AM ; [facility]  
  
  
  
                                        Post-traumatic stress disorder  Establ  
ished Patient with Haylie Aguilar   
LPCC-S                                  2022  
  
  
  
                                                    Last Documented On   
2 3:20PM ; [facility]  
  
  
  
                                No cough        Medical Established Patient with  
 Doreen Cabrera CNP 2022  
  
  
  
                                                    Last Documented On   
2 4:04PM ; [facility]  
  
  
  
                                                    Z68.43 - Body mass index [BM  
I]   
50.0-59.9, adult                        Medical Established Patient with   
Doreen Cabrera CNP                        2022  
  
  
  
                                                    Last Documented On   
2 4:04PM ; [facility]  
  
  
  
                                                    Borderline personality disor  
danny Pt   
reported hx of sx/dx                     Established Patient with Haylie Aguilar LPCC-S                        2022  
  
  
  
                                                    Last Documented On   
2 4:08PM ; [facility]  
  
  
  
                                                    Assessment of body mass inde  
x [Body   
mass index [BMI] 50.0-59.9, adult]      Open Access - Established with Julieth   
Aliya CNP                               2022  
  
  
  
                                                    Last Documented On   
2 7:41PM ; [facility]  
  
  
  
                                                    Diabetes Risk Test Score was  
 three   
score 2022                         Open Access - Established with Julieth   
Aliya CNP                               2022  
  
  
  
                                                    Last Documented On   
2 7:41PM ; [facility]  
  
  
  
                                                    Bipolar I disorder, most rec  
ent   
episode, manic                           Established Patient with Avis   
Felder LPCC-S                          2021  
  
  
  
                                                    Last Documented On   
1 1:35AM ; [facility]  
  
  
  
                                        Borderline personality disorder  Estab  
lished Patient with Avis   
Felder LPCC-S                          2021  
  
  
  
                                                    Last Documented On   
1 1:35AM ; [facility]  
  
  
  
                                        Post-traumatic stress disorder  Establ  
ished Patient with Avis   
Felder LPCC-S                          2021  
  
  
  
                                                    Last Documented On   
1 1:35AM ; [facility]  
  
  
  
                                                    Assessment of visit for: bhargavi myles for   
human immunodeficiency virus            Medical Established Patient with   
Doreen Cabrera CNP                        2021  
  
  
  
                                                    Last Documented On   
1 2:56PM ; [facility]  
  
  
  
                                Nicotine dependence Medical Established Patient   
with Doreenpaola Cabrera CNP 2021  
  
  
  
                                                    Last Documented On   
1 2:56PM ; [facility]  
  
  
  
                                Tachycardia     Medical Established Patient with  
 Doreen Cabrera CNP 2021  
  
  
  
                                                    Last Documented On   
1 2:56PM ; [facility]  
  
  
  
                                                    Z68.43 - Body mass index [BM  
I]   
50.0-59.9, adult                        Medical Established Patient with   
Doreenpaola Cabrera CNP                        2021  
  
  
  
                                                    Last Documented On   
1 2:56PM ; [facility]  
  
  
  
                                                    Bipolar I disorder, most rec  
ent   
episode, manic                           Established Patient with Leonie   
Short LISWS                             2021  
  
  
  
                                                    Last Documented On   
1 10:17AM ; [facility]  
  
  
  
                                                    Borderline personality disor  
danny per   
patient reported history                 Established Patient with Leonie   
Short LISWS                             2021  
  
  
  
                                                    Last Documented On   
1 10:17AM ; [facility]  
  
  
  
                                Nicotine dependence  Established Patient with   
Leonie Short LISWS 2021  
  
  
  
                                                    Last Documented On   
1 10:17AM ; [facility]  
  
  
  
                                        Post-traumatic stress disorder  Establ  
ished Patient with Leonie Short   
LISWS                                   2021  
  
  
  
                                                    Last Documented On   
1 10:17AM ; [facility]  
  
  
  
                                Body mass index Medical Established Patient with  
 Doreen Cabrera CNP 2021  
  
  
  
                                                    Last Documented On   
1 5:23PM ; [facility]  
  
  
  
                                Morbid obesity  Medical Established Patient with  
 Doreen Deborah CNP 2021  
  
  
  
                                                    Last Documented On   
1 5:23PM ; [facility]  
  
  
  
                                        Nicotine dependence uncomplicated Medica  
l Established Patient with Doreen Cabrera CNP                              2021  
  
  
  
                                                    Last Documented On   
1 5:23PM ; [facility]  
  
  
  
                                                    Z68.42 - Body mass index [BM  
I]   
45.0-49.9, adult                        Medical Established Patient with   
Doreenpaola Mccormacken CNP                        2021  
  
  
  
                                                    Last Documented On   
1 5:23PM ; [facility]  
  
  
  
                                Episodic mood disorders On Call with Pamela edwards CNP 2021  
  
  
  
                                                    Last Documented On   
1 7:49AM ; [facility]  
  
  
  
                                                    Mood disorders, NOS per tabatha  
ent   
reported history                         Established Patient with Leonie   
Short LISWS                             2021  
  
  
  
                                                    Last Documented On   
1 7:15PM ; [facility]  
  
  
  
                                        Post-traumatic stress disorder  Establ  
ished Patient with Leonie Short   
LISWS                                   2021  
  
  
  
                                                    Last Documented On   
1 7:15PM ; [facility]  
  
  
  
                                Morbid obesity  Medical Established Patient with  
 Doreen Deborah CNP 2021  
  
  
  
                                                    Last Documented On   
1 12:31PM ; [facility]  
  
  
  
                                Otitis externa  Medical Established Patient with  
 Doreen Deborah CNP 2021  
  
  
  
                                                    Last Documented On   
1 12:31PM ; [facility]  
  
  
  
                                                    Z68.42 - Body mass index [BM  
I]   
45.0-49.9, adult                        Medical Established Patient with   
Doreen Deborah CNP                        2021  
  
  
  
                                                    Last Documented On   
1 12:31PM ; [facility]  
  
  
  
                                        Post-traumatic stress disorder  Establ  
ished Patient with Leonie Short   
LISWS                                   06/10/2021  
  
  
  
                                                    Last Documented On 06/10/202  
1 10:05PM ; [facility]  
  
  
  
                                Morbid obesity  Medical Established Patient with  
 Doreen Deborah CNP 06/10/2021  
  
  
  
                                                    Last Documented On 06/10/202  
1 4:45PM ; [facility]  
  
  
  
                                                    Z68.42 - Body mass index [BM  
I]   
45.0-49.9, adult                        Medical Established Patient with   
Doreen Deborah CNP                        06/10/2021  
  
  
  
                                                    Last Documented On 06/10/202  
1 4:45PM ; [facility]  
  
  
  
                                        Post-traumatic stress disorder  Establ  
ished Patient with Leonie Short   
LISWS                                   2021  
  
  
  
                                                    Last Documented On   
1 11:59AM ; [facility]  
  
  
  
                                                    Assessment of visit for: bhargavi  
eening for   
human immunodeficiency virus            Medical New Patient with Doreenpaola Cabrera CNP                              2021  
  
  
  
                                                    Last Documented On   
1 4:06PM ; [facility]  
  
  
  
                                                    Diabetes Risk Test Score was  
 one score   
2021                               Medical New Patient with Doreen   
Deborah CNP                              2021  
  
  
  
                                                    Last Documented On   
1 4:06PM ; [facility]  
  
  
  
                                Hypertension    Medical New Patient with Doreen C  
cate CNP 2021  
  
  
  
                                                    Last Documented On   
1 4:06PM ; [facility]  
  
  
  
                                Morbid obesity  Medical New Patient with Doreen C  
cate CNP 2021  
  
  
  
                                                    Last Documented On   
1 4:06PM ; [facility]  
  
  
  
                                Post-traumatic stress disorder Medical New Patie  
nt with Doreen Cabrera CNP   
2021  
  
  
  
                                                    Last Documented On   
1 4:06PM ; [facility]  
  
  
  
                                                    Z68.42 - Body mass index [BM  
I]   
45.0-49.9, adult                        Medical New Patient with Doreen Cabrera CNP                              2021  
  
  
  
                                                    Last Documented On   
1 4:06PM ; [facility]  
Work Phone: [phone]- History general Narrative - Reported  
  
Includes: Medical History in patient's chart  
  
  
  
                                        Description         Last Updated  
   
                                        0 previous live birth(s) 2024  
  
  
  
                                                    Last Documented On   
4 7:50PM ; [facility]  
  
  
  
                                        Previously pregnant 1 time(s) 2024  
  
  
  
                                                    Last Documented On   
4 7:50PM ; [facility]  
  
  
  
                                        Recent immunization for flu 2024  
  
  
  
                                                    Last Documented On   
4 7:50PM ; [facility]  
  
  
  
                                        Has sex without a condom 2022  
  
  
  
                                                    Last Documented On   
2 4:04PM ; [facility]  
  
  
  
                                        Not planning to have a baby in the next   
12 months 2022  
  
  
  
                                                    Last Documented On   
2 4:04PM ; [facility]  
  
  
  
                                        Partners sexually transmitted infection   
status known 2022  
  
  
  
                                                    Last Documented On   
2 4:04PM ; [facility]  
  
  
  
                                        No previous hospitalizations 2022  
  
  
  
                                                    Last Documented On   
2 4:04PM ; [facility]  
  
  
  
                                        Chronic illness     2021  
  
  
  
                                                    Last Documented On   
1 7:49AM ; [facility]  
  
  
  
                                        Exposure to COVID-19 2021  
  
  
  
                                                    Last Documented On   
1 12:31PM ; [facility]  
  
  
  
                                        History of gynecologic disorder 20  
21  
  
  
  
                                                    Last Documented On   
1 4:06PM ; [facility]  
  
  
  
                                        History of Polycystic Ovarian Syndrome (  
PCOS) 2021  
  
  
  
                                                    Last Documented On   
1 4:06PM ; [facility]  
  
  
  
                                        History of anxiety disorder NOS 20  
21  
  
  
  
                                                    Last Documented On   
1 4:06PM ; [facility]  
  
  
  
                                        History of migraine headache 2021  
  
  
  
                                                    Last Documented On   
1 4:06PM ; [facility]  
  
  
  
                                        History of psychiatric disorders biopola  
r disorder 2021  
  
  
  
                                                    Last Documented On   
1 4:06PM ; [facility]  
  
[facility]  
Work Phone: [phone]- History general Narrative - Reported  
  
Includes: Medical History in patient's chart  
  
  
  
                                        Description         Last Updated  
   
                                        0 previous live birth(s) 2024  
  
  
  
                                                    Last Documented On   
4 7:50PM ; [facility]  
  
  
  
                                        Previously pregnant 1 time(s) 2024  
  
  
  
                                                    Last Documented On   
4 7:50PM ; [facility]  
  
  
  
                                        Recent immunization for flu 2024  
  
  
  
                                                    Last Documented On   
4 7:50PM ; [facility]  
  
  
  
                                        Has sex without a condom 2022  
  
  
  
                                                    Last Documented On   
2 4:04PM ; [facility]  
  
  
  
                                        Not planning to have a baby in the next   
12 months 2022  
  
  
  
                                                    Last Documented On   
2 4:04PM ; [facility]  
  
  
  
                                        Partners sexually transmitted infection   
status known 2022  
  
  
  
                                                    Last Documented On   
2 4:04PM ; [facility]  
  
  
  
                                        No previous hospitalizations 2022  
  
  
  
                                                    Last Documented On   
2 4:04PM ; [facility]  
  
  
  
                                        Chronic illness     2021  
  
  
  
                                                    Last Documented On   
1 7:49AM ; [facility]  
  
  
  
                                        Exposure to COVID-19 2021  
  
  
  
                                                    Last Documented On   
1 12:31PM ; [facility]  
  
  
  
                                        History of gynecologic disorder 20  
21  
  
  
  
                                                    Last Documented On   
1 4:06PM ; [facility]  
  
  
  
                                        History of Polycystic Ovarian Syndrome (  
PCOS) 2021  
  
  
  
                                                    Last Documented On   
1 4:06PM ; [facility]  
  
  
  
                                        History of anxiety disorder NOS 20  
21  
  
  
  
                                                    Last Documented On   
1 4:06PM ; [facility]  
  
  
  
                                        History of migraine headache 2021  
  
  
  
                                                    Last Documented On   
1 4:06PM ; [facility]  
  
  
  
                                        History of psychiatric disorders biopola  
r disorder 2021  
  
  
  
                                                    Last Documented On   
1 4:06PM ; [facility]  
Work Phone: [phone]- History general Narrative - Reported  
  
Includes: Medical History in patient's chart  
  
  
  
                                        Description         Last Updated  
   
                                        0 previous live birth(s) 2024  
  
  
  
                                                    Last Documented On   
4 7:50PM ; [facility]  
  
  
  
                                        Previously pregnant 1 time(s) 2024  
  
  
  
                                                    Last Documented On   
4 7:50PM ; [facility]  
  
  
  
                                        Recent immunization for flu 2024  
  
  
  
                                                    Last Documented On   
4 7:50PM ; [facility]  
  
  
  
                                        Has sex without a condom 2022  
  
  
  
                                                    Last Documented On   
2 4:04PM ; [facility]  
  
  
  
                                        Not planning to have a baby in the next   
12 months 2022  
  
  
  
                                                    Last Documented On   
2 4:04PM ; [facility]  
  
  
  
                                        Partners sexually transmitted infection   
status known 2022  
  
  
  
                                                    Last Documented On   
2 4:04PM ; [facility]  
  
  
  
                                        No previous hospitalizations 2022  
  
  
  
                                                    Last Documented On   
2 4:04PM ; [facility]  
  
  
  
                                        Chronic illness     2021  
  
  
  
                                                    Last Documented On   
1 7:49AM ; [facility]  
  
  
  
                                        Exposure to COVID-19 2021  
  
  
  
                                                    Last Documented On   
1 12:31PM ; [facility]  
  
  
  
                                        History of gynecologic disorder 20  
21  
  
  
  
                                                    Last Documented On   
1 4:06PM ; [facility]  
  
  
  
                                        History of Polycystic Ovarian Syndrome (  
PCOS) 2021  
  
  
  
                                                    Last Documented On   
1 4:06PM ; [facility]  
  
  
  
                                        History of anxiety disorder NOS 20  
21  
  
  
  
                                                    Last Documented On   
1 4:06PM ; [facility]  
  
  
  
                                        History of migraine headache 2021  
  
  
  
                                                    Last Documented On   
1 4:06PM ; [facility]  
  
  
  
                                        History of psychiatric disorders biopola  
r disorder 2021  
  
  
  
                                                    Last Documented On   
1 4:06PM ; [facility]  
Work Phone: [phone]- Progress note*   
  
Progress note  
  
  
  
                                Date            Encounter       Last Documented   
by  
   
                                2024      Medical Established Patient Last  
 documented on 2024; 7:50 PM,   
Doreen Cabrera CNP; [facility]  
  
  
  
                                                      
  
  
** Active Problems & Conditions **  
- F90.9 - Attention-deficit Hyperactivity Disorder  
- F31.31 - Bipolar I Disorder, Most Recent Episode, Depressed Mild  
- F43.10 - Post-traumatic Stress Disorder  
  
** Chief Complaint **  
The Chief Complaint is: Patient was D/C'd from [facility] in 
[place]   
for no call no show. Patient needs all meds refilled. Ran out 3 days ago.  
  
** Referred Here **  
No prior encounters.  
  
** History of Present Illness **  
Linnette Beckham is a 23 year old female.  
- Allergy list reviewed - Reviewed Medications - Medication list reviewed  
- Date of last menstruation 2024 - Pregnancy test - would not like a 
pregnancy   
test  
Presents to see if we can take over psych meds , got dismissed from [facility] r/t no 
shows.   
has felt stable on meds x 11 months  
  
** Current Medication **  
- ARIPiprazole 5 MG Oral Tablet once daily, 90 days, 0 refills  
- busPIRone HCl 5 MG Oral Tablet one tablet twice daily, 90 days, 0 refills  
- lamoTRIgine 100 MG Oral Tablet once daily, 30 days, 0 refills  
- metFORMIN HCl 500 MG Oral Tablet AM and PM per GYN/N[facility]S in [place], 30 days, 0
   
refills  
- MiraLax 17 GM/SCOOP Oral Powder mix 1 scoop with 8 ounces once daily, 30 days,
 2   
refills  
- Narcan 4 MG/0.1ML Nasal Liquid spray in nostril for symptoms of overdose , may
   
repeat in 2 minutes if symptoms persist, 1 days, 0 refills  
- Prazosin HCl 1 MG Oral Capsule twice daily, 90 days, 0 refills  
- Sertraline HCl 50 MG Oral Tablet Once daily, 30 days, 0 refills  
- traZODone HCl 100 MG Oral Tablet twice daily, 30 days, 0 refills  
  
** Past Medical/Surgical History **  
Reported:  
Has sex without a condom.  
Medical: No previous hospitalizations. Chronic illness and Sexually transmitted   
infection Partners sexually transmitted infection status known.  
Immunization History: Recent immunization for flu.  
Exposure: Exposure to COVID-19.  
Pregnancy: Previously pregnant 1 time(s) and para having 0 live birth(s). Not   
planning a pregnancy in the next year.  
Diagnoses:  
Polycystic Ovarian Syndrome (PCOS).  
Migraine headache.  
Psychiatric disorders biopolar disorder  
Anxiety disorder NOS  
Procedural:  
- Insertion of ear pressure equalization tubes in both ears  
Surgical:  
- Tonsillectomy  
- Tonsillectomy with adenoidectomy  
  
** Social History **  
Environmental Exposure: No secondhand cigarette smoke exposure.  
Behavioral: Not a current tobacco user.  
Tobacco use: Using electronic cigarettes/vaping.  
Alcohol: Not using alcohol.  
Drug Use: Not using drugs denied by patient.  
Sexual: Sexually active age of sexual partner is _____ years old 21, sexual   
orientation Lesbian or David, gender identity Other, and birth control is being   
practiced Not Using - In Same Sex Relationship.  
  
** Allergies **  
- Abilify Reaction: groggy  
- Augmentin Reaction: Shock  
- metformin Reaction: Nausea, Vomiting  
- trazodone Reaction: Panic attack  
- Zoloft Reaction: slow/ groggy  
  
** Family History **  
Paternal:  
Systemic hypertension  
Oncologic disorder  
Maternal:  
Systemic hypertension  
Epilepsy and recurrent seizures  
Psychiatric disorders  
Oncologic disorder  
Fraternal:  
Psychiatric disorders  
  
** Review Of Systems **  
Head: No head symptoms.  
Neck: No neck symptoms.  
Eyes: No eye symptoms.  
Otolaryngeal: No ear symptoms, no nasal symptoms, no nose and sinus finding, no   
throat symptoms, no oral cavity symptoms, and no jaw symptoms.  
Breasts: No breast symptoms.  
Cardiovascular: No cardiovascular symptoms and no chest pain or discomfort.  
Pulmonary: No pulmonary symptoms.  
Gastrointestinal: No gastrointestinal symptoms.  
Genitourinary: No genitourinary symptoms.  
Musculoskeletal: No musculoskeletal symptoms.  
Neurological: No neurological symptoms.  
Psychological: Easily distracted.  
Skin: No skin symptoms.  
  
** Physical Findings **  
- Vitals taken 2024 07:09 pm  
BP-Sitting R134/88 mmHg  
BP Cuff SizeLarge  
Pulse Rate-Kseekcs751 bpm  
Liqunc77 in  
Excrng257 lbs  
Body Mass Index52.7 kg/m2  
Body Surface Area2.3 m2  
Oxygen Etatehtpox41 %  
  
Vital Signs:  
- Systolic blood pressure 130 - 139 mmHg.  
- Diastolic blood pressure 80-89 mmHg.  
General Appearance:  
- Awake. - Alert. - Well developed. - Well nourished. - In no acute distress.  
Lungs:  
- Respiration rhythm and depth was normal. - Clear to auscultation.  
Cardiovascular:  
Heart Rate And Rhythm: - Normal.  
Heart Sounds: - Normal.  
Murmurs: - No murmurs were heard.  
Abdomen:  
Visual Inspection: - Abdomen was normal on visual inspection.  
Musculoskeletal System:  
General/bilateral: - Normal movement of all extremities.  
Skin:  
- General appearance was normal.  
  
** Tests **  
Blood Analysis:  
Hemoglobin Studies: ValueDate  
Blood hemoglobin A1c 5.5%3/27/2024  
Hemoglobin A1c level < 7.0%.  
Blood Endocrine Laboratory Tests: Value  
Blood glucose level by fingerstick Non-fasting 96 mg/dl  
Laboratory-based Chemistry:  
Other Laboratory Tests:  
Screening for sexually transmitted infections was performed.  
  
** Assessment **  
- Z11.3 - Encounter for screening for infections with a predominantly sexual 
mode of   
transmission  
- Z68.43 - Body mass index [BMI] 50.0-59.9, adult  
- Z13.1 - Encounter for screening for diabetes mellitus  
- F90.9 - Attention-deficit hyperactivity disorder, unspecified type  
  
** Vaccinations **  
- 1st Dose PFIZER COVID-19 Vaccine Dose #1 Status: Prev Hist Date: 2021  
- 1st Dose PFIZER COVID-19 Vaccine Dose #2 Status: Prev Hist Date: 2021  
- DTP Dose #1 Status: Prev Hist Date: 1999  
- Hep B, adolescent or pediatric (Engerix-B) Dose #1 Status: Prev Hist Date:   
1999  
- Hep B, adolescent or pediatric (Engerix-B) Dose #2 Status: Prev Hist Date:   
1999  
- Hib-Hep B (Comvax) Dose #1 Status: Prev Hist Date: 2000  
- IPV (IPOL) Dose #1 Status: Prev Hist Date: 1999  
- IPV (IPOL) Dose #2 Status: Prev Hist Date: 2000  
- IPV (IPOL) Dose #3 Status: Prev Hist Date: 2004  
- MMR (MMR-II) Dose #1 Status: Prev Hist Date: 2000  
- MMR (MMR-II) Dose #2 Status: Prev Hist Date: 2004  
- Meningococcal MCV4O Dose #1 Status: Prev Hist Date: 2016  
- OPV Dose #1 Status: Prev Hist Date: 1999  
- Tdap (Boostrix) Dose #1 Status: Prev Hist Date: 2010  
  
- Received dose of Reported: Patient has received the COVID Vaccine  
  
** Counseling/Education **  
- Wishing to stop using electronic cigarettes/vaping  
- Discussed nutritional needs teach healthy choices including fruits and 
vegetables  
- Patient education about a proper diet  
- Not requesting contraception  
- Discussed concerns about exercise: promote physical activity  
  
** Plan **  
StartCited- Attention-deficit hyperactivity disorder, unspecified type  
Intuniv 1 MG tablet take 1 tablet by mouth once daily at bedtime, 30 days, 5 
refills  
EndCited  
StartCited- Encntr screen for infections w sexl mode of transmiss  
Outside Labs/Microbiology: All 3 Urine Test Gonorrhea-Chlamydia-Trichomonas  
EndCited  
StartCited- Other  
Follow-up Appointment  
2 month med check  
ARIPiprazole 5 MG tablet take 1 tablet by mouth once daily, 30 days, 5 refills  
busPIRone HCl 5 MG tablet take 1 tablet by mouth twice daily, 30 days, 5 refills  
lamoTRIgine 100 MG tablet take 1 tablet by mouth once daily, 30 days, 5 refills  
Prazosin HCl 1 MG capsule take 1 capsule by mouth twice daily, 30 days, 5 
refills  
Sertraline HCl 50 MG tablet take 1 tablet by mouth once daily, 30 days, 5 
refills  
traZODone HCl 100 MG tablet take 1 tablet by mouth twice daily, 30 days, 5 
refills  
EndCited  
** Care Team **  
- Doreen Cabrera CNP  
  
** Health Reminders **  
- Assess BMI satisfied 2024.  
- Assess Tobacco Use satisfied 2024.  
- Diabetes Risk Screening Needed satisfied 2024.  
- Follow Up Plan BMI Management satisfied 2024.  
- Smoking & Tobacco Cessation Intervention and Counseling satisfied 2024.  
  
** User Defined 1 **  
Not planning a pregnancy in the next year.  
No (0 points) [Pre-DM]: No, patient has not been diagnosed with high blood 
pressure,   
No (0 points) [Pre-DM]: Patient has not been diagnosed with gestational diabetes
or   
given birth to a baby weighing 9 pounds or more, No (0 points) [Pre-DM]: No, 
mother,   
father, sister, or brother does not have DM, No (1 point) [Pre-DM]: No, not   
physically active, and Woman (0 Points) [Pre-DM].  
Less than 40 years (0 points) [Pre-DM].  
  
  
[facility]- Progress note*   
  
Progress note  
  
  
  
                                Date            Encounter       Last Documented   
by  
   
                                2024       Established Patient Last docu  
mented on 2024; 5:16 PM,   
Leonie HUTCHINSON; [facility]  
  
  
  
                                                      
  
  
** Active Problems & Conditions **  
- F90.9 - Attention-deficit Hyperactivity Disorder  
- F31.31 - Bipolar I Disorder, Most Recent Episode, Depressed Mild  
- F43.10 - Post-traumatic Stress Disorder  
  
** Chief Complaint **  
The Chief Complaint is: [facility] met with patient to follow-up regarding mood and   
medications and discuss PHQ score of 2. Patient reports recently getting 
dismissed   
from services at a [facility] in [place] due to missing 
appointments.   
Patient reports being on a good medication regimen and feels that moods have 
been   
stable, sober from drugs for 6-7 months, in a healthy relationship and engaging 
with   
family and friends. Patient reports being diagnosed with ADHD but not treated at
 this   
time and would like to be due to concentration and focus issues. Patient reports
 no   
thoughts of harm toward self or others.  
  
** History of Present Illness **  
Linnette Beckham is a 23 year old female.  
- No Irritability - Normal appetite  
- Anxiety - Energy level is good - Easily distracted - Racing thoughts - No   
depression - No sleep disturbances - No loss of interest in activities - Not 
feeling   
guilty - No social isolation - No impulsive behavior - No high involvement in   
pleasurable activities  
  
** Current Medication **  
- ARIPiprazole 5 MG Oral Tablet take 1 tablet by mouth once daily, 30 days, 5 
refills  
- ARIPiprazole 5 MG Oral Tablet once daily, 90 days, 0 refills  
- busPIRone HCl 5 MG Oral Tablet take 1 tablet by mouth twice daily, 30 days, 5   
refills  
- busPIRone HCl 5 MG Oral Tablet one tablet twice daily, 90 days, 0 refills  
- Intuniv 1 MG Oral Tablet Extended Release 24 Hour take 1 tablet by mouth once 
daily   
at bedtime, 30 days, 5 refills  
- lamoTRIgine 100 MG Oral Tablet take 1 tablet by mouth once daily, 30 days, 5   
refills  
- lamoTRIgine 100 MG Oral Tablet once daily, 30 days, 0 refills  
- metFORMIN HCl 500 MG Oral Tablet AM and PM per GYN/[facility]S in [place], 30 days, 0
   
refills  
- MiraLax 17 GM/SCOOP Oral Powder mix 1 scoop with 8 ounces once daily, 30 days,
 2   
refills  
- Narcan 4 MG/0.1ML Nasal Liquid spray in nostril for symptoms of overdose , may
   
repeat in 2 minutes if symptoms persist, 1 days, 0 refills  
- Prazosin HCl 1 MG Oral Capsule take 1 capsule by mouth twice daily, 30 days, 5
   
refills  
- Prazosin HCl 1 MG Oral Capsule twice daily, 90 days, 0 refills  
- Sertraline HCl 50 MG Oral Tablet take 1 tablet by mouth once daily, 30 days, 5
   
refills  
- Sertraline HCl 50 MG Oral Tablet Once daily, 30 days, 0 refills  
- traZODone HCl 100 MG Oral Tablet take 1 tablet by mouth twice daily, 30 days, 
5   
refills  
- traZODone HCl 100 MG Oral Tablet twice daily, 30 days, 0 refills  
  
** Social History **  
Environmental Exposure: No secondhand cigarette smoke exposure.  
Personal: Recent emotional stress due to running out of medications recently.  
Behavioral: Not a current tobacco user.  
Tobacco use: Using electronic cigarettes/vaping.  
Alcohol: Not using alcohol.  
Drug Use: Recovering from drug addiction. Not using drugs.  
  
** Physical Findings **  
General Appearance:  
- Normal Appearance.  
Neurological:  
- Estimated intelligence was normal. - Oriented to time, place, and person. - No
   
hallucinations. - Judgement was not impaired.  
Speech: - Is Normal.  
Psychiatric:  
- Mood is Euthymic. - Attitude Open.  
Demonstrated Behavior: - Motor Activity Normal Activity. - Eye Contact 
Appropriate.  
Affect: - Congruent with the mood.  
Thought Content: - Insight was intact. - No delusions. - No suicidal ideation. -
 No   
Passive thoughts of Death. - No suicidal plans. - No suicidal intent. - No 
homicidal   
ideations. - No homicidal plans. - No homicidal intent.  
Past Medical:  
- No repetitive self injurious behavior.  
  
** Assessment **  
- Z13.89 - Encounter for screening for other disorder  
- F90.9 - Attention-deficit hyperactivity disorder, unspecified type  
- F31.31 - Bipolar disorder, current episode depressed, mild  
- F43.10 - Post-traumatic stress disorder, unspecified  
  
** Therapy **  
- Developmental/Behavioral Screening & Testing - PHQ9.  
- SBIRT Full Screen Neg.  
- Brief solution-focused therapy.  
-  Visit 30 Minutes.  
- Plan - PCP to continue current medications and start Intuniv 1 mg daily. 
Patient   
agreeable to plan and Collaborated with patient and provider:  
  
** Counseling/Education **  
P offered active and supportive listening and processed current stressors 
related   
to getting medications.  
BHP discussed coping skills and supports to implement in daily routine.  
P discussed progress patient has felt they have made recently and encouraged   
continued follow-up with providers to address health.  
  
** Plan **  
Patient to take medications as prescribed and contact the office with any 
questions   
or concerns.  
Patient to implement coping skills and positive supports as discussed.  
P to attempt to follow-up with patient via phone in 2 weeks and at next in 
person   
visit as scheduled.  
  
** Care Team **  
- Doreen Cabrera CNP  
  
** Health Reminders **  
- Assess Tobacco Use satisfied 2024.  
- ESTHELA-2 satisfied 2024.  
- PHQ9 / PHQA satisfied 2024.  
- SBIRT satisfied 2024.  
  
** User Defined 1 **  
Not afraid of someone you have a relationship with No.  
Has lack of transportation kept patient from medical appointments or from 
getting   
medications: No and lack of transportation has kept patient from beneficial   
non-medical activities: No.  
She has not had 4 or more drinks in a day within the past year. Do you feel 
stress -   
tense, restless, nervous, or anxious, or unable to sleep at night because your 
mind   
is troubled all the time - these days? A little bit. No misuse of prescription 
only   
drugs. Illicit drug use and Does patient feel physically and emotionally safe 
where   
he/she lives: Yes. Is patient is worried about losing housing: No. What is the   
highest grade or level of school you have completed or the highest degree you 
have   
received? More than high school. In the past year, patient or family members in   
household were unable to get needed clothing: No, unable to get needed child 
care:   
No, unable to get other needs No, unable to get needed phone: No, unable to get   
needed utilities: No, unable to get needed Medicine or Health Care: No, and In 
the   
past year, patient or family members in household were unable to get needed 
food: No.   
How often does patient see or talk to people that that he/she cares about and 
feels   
close to: 5 or more times a week.  
ESTHELA-2 score was two 3/27/2024, ESTHELA-7 score [ESTHELA-7] Feeling nervous, anxious or 
on   
edge? + 2 pt : More than half the days, [ESTHELA-7] Not being able to stop or 
control   
worrying? + 0 pt : Not at all, Patient Health Questionnaire 9-Item total score 
was   
two 3/27/2024 If you checked off problems, how difficult is it for you to do 
your   
work? + : Somewhat difficult, [PHQ-9-1] Little interest or pleasure in doing 
things?   
+ 0 pt : Not at all, [PHQ-9-2] Feeling down, depressed, or hopeless? + 0 pt : 
Not at   
all, [PHQ-9-3] Trouble falling or staying asleep or sleeping too much? + 0 pt : 
Not   
at all, [PHQ-9-4] Feeling tired or having little energy? + 0 pt : Not at all,   
[PHQ-9-5] Poor appetite or overeating? + 0 pt : Not at all, [PHQ-9-6] Feeling 
bad   
about yourself-or that you are a failure + 0 pt : Not at all, [PHQ-9-7] Trouble   
concentrating on things such as reading the newspaper + 2 pt : More than half 
the   
days, [PHQ-9-8] Moving or speaking so slowly that other people have noticed. + 0
 pt :   
Not at all, and [PHQ-9-9] Thoughts that you would be better off dead or hurting   
yourself? + 0 pt : Not at all.  
  
  
[facility]- Progress note*   
  
Progress note  
  
  
  
                                Date            Encounter       Last Documented   
by  
   
                                2023      Medical Established Patient Last  
 documented on 2023; 6:01 PM,   
Doreen Cabrera CNP; [facility]  
  
  
  
                                                      
  
  
** Active Problems & Conditions **  
- F31.10 - Bipolar I Disorder, Most Recent Episode, Manic  
- F60.3 - Borderline Personality Disorder  
- F43.10 - Post-traumatic Stress Disorder  
  
** Chief Complaint **  
The Chief Complaint is: Yearly follow up/would like referral to psychiatry (not 
Dr. Sullivan).  
  
** Referred Here **  
No prior encounters.  
  
** History of Present Illness **  
Linnette Beckham is a 23 year old female.  
- Allergy list reviewed - Reviewed Medications not taking any medications -   
Medication list reviewed  
Presents to get psych referral  anyone but dr sullivan  we had tried dr alonso in 
the   
past who was not accepting pts at that time. bh not in house and pt felt she 
didnt   
need to speak to the outside provider covering   I have a counselor   
  
Yearly follow up, would like a referral to psychiatry/not Dr. Sullivan, she does see
   
Haylie Almendarez a counselor at [facility]  
Currently only taking Metformin d/t PCOS  
  
** Current Medication **  
- Aldactazide 50-50 MG Oral Tablet take 1 tablet by mouth once daily, 30 days, 
11   
refills  
- metFORMIN HCl 500 MG Oral Tablet AM and PM per GYN/[facility] in [place], 30 days, 0
   
refills  
- MiraLax 17 GM/SCOOP Oral Powder mix 1 scoop with 8 ounces once daily, 30 days,
 2   
refills  
- Narcan 4 MG/0.1ML Nasal Liquid spray in nostril for symptoms of overdose , may
   
repeat in 2 minutes if symptoms persist, 1 days, 0 refills  
  
** Past Medical/Surgical History **  
Reported:  
Has sex without a condom.  
Medical: No previous hospitalizations. Chronic illness and Sexually transmitted   
infection Partners sexually transmitted infection status known.  
Exposure: Exposure to COVID-19.  
Pregnancy: Previously pregnant 0 time(s). Not planning to have a baby in the 
next 12   
months.  
Diagnoses:  
Polycystic Ovarian Syndrome (PCOS).  
Migraine headache.  
Psychiatric disorders biopolar disorder  
Anxiety disorder NOS  
Procedural:  
- Insertion of ear pressure equalization tubes in both ears  
Surgical:  
- Tonsillectomy  
- Tonsillectomy with adenoidectomy  
  
** Social History **  
Environmental Exposure: No secondhand cigarette smoke exposure.  
Behavioral: Not a current tobacco user.  
Tobacco use: Not using electronic cigarettes/vaping.  
Alcohol: Not using alcohol.  
Drug Use: Using marijuana.  
Sexual: Sexual orientation Other and gender identity Other.  
  
** Allergies **  
- Abilify Reaction: groggy  
- Augmentin Reaction: Shock  
- metformin Reaction: Nausea, Vomiting  
- trazodone Reaction: Panic attack  
- Zoloft Reaction: slow/ groggy  
  
** Family History **  
Paternal:  
Systemic hypertension  
Oncologic disorder  
Maternal:  
Systemic hypertension  
Epilepsy and recurrent seizures  
Psychiatric disorders  
Oncologic disorder  
Fraternal:  
Psychiatric disorders  
  
** Review Of Systems **  
Head: No head symptoms.  
Neck: No neck symptoms.  
Eyes: No eye symptoms.  
Otolaryngeal: No ear symptoms, no nasal symptoms, no nose and sinus finding, no   
throat symptoms, no oral cavity symptoms, and no jaw symptoms.  
Breasts: No breast symptoms.  
Cardiovascular: No cardiovascular symptoms and no chest pain or discomfort.  
Pulmonary: No pulmonary symptoms.  
Gastrointestinal: No gastrointestinal symptoms.  
Genitourinary: No genitourinary symptoms.  
Musculoskeletal: No musculoskeletal symptoms.  
Neurological: No neurological symptoms.  
Psychological: No psychological symptoms.  
Skin: No skin symptoms.  
Cardiovascular: Edema not present.  
  
** Physical Findings **  
- Vitals taken 2023 05:08 pm  
BP-Sitting L111/76 mmHg  
BP Cuff SizeLarge  
Pulse Rate-Rfvezom29 bpm  
Temp-Oral98.2 F  
Aziffu46 in  
Mlgwte190 lbs  
Body Mass Index32.8 kg/m2  
Body Surface Area1.9 m2  
Oxygen Funkxxyqka22 %  
  
Vital Signs:  
- Systolic blood pressure < 130 mmHg.  
- Diastolic Blood Pressure < 80 mmHg.  
General Appearance:  
- Awake. - Alert. - Well developed. - Well nourished. - In no acute distress.  
Lungs:  
- Respiration rhythm and depth was normal. - Clear to auscultation.  
Cardiovascular:  
Heart Rate And Rhythm: - Normal.  
Heart Sounds: - Normal.  
Murmurs: - No murmurs were heard.  
Thrill: - No thrill.  
Abdomen:  
Visual Inspection: - Abdomen was normal on visual inspection.  
Musculoskeletal System:  
General/bilateral: - Abnormal movement of all extremities.  
Skin:  
- General appearance was normal.  
  
** Tests **  
Blood Analysis:  
Hemoglobin Studies: ValueDate  
Blood hemoglobin A1c 5.3%2023  
Hemoglobin A1c level < 7.0%.  
Blood Endocrine Laboratory Tests: Value  
Blood glucose level by fingerstick Non-fasting 114 mg/dl  
  
** Assessment **  
- Z68.32 - Body mass index [BMI] 32.0-32.9, adult  
- F60.3 - Borderline personality disorder  
- Z13.1 - Encounter for screening for diabetes mellitus  
  
** Therapy **  
- Patient has agreed to receive flu vaccine today.  
  
** Vaccinations **  
- Received dose of Reported: Patient has received the COVID Vaccine  
  
** Counseling/Education **  
- Discussed nutritional needs teach healthy choices including fruits and 
vegetables  
- Patient education about a proper diet  
- Discussed concerns about exercise: promote physical activity  
  
** Plan **  
StartCited- Borderline personality disorder  
Referrals: Psychiatry  
Instructions: Please make a referral to: see if dr alonso accepting now, 
otherwise ok   
with arm[facility] or edy  
EndCited  
StartCited- Essential (primary) hypertension  
Aldactazide 50-50 MG tablet take 1 tablet by mouth once daily, 30 days, 11 
refills  
EndCited  
StartCited- Other  
Follow-up Appointment  
f/u phone call 1 month  
EndCited  
** Health Reminders **  
- Assess BMI satisfied 2023.  
- Assess Tobacco Use satisfied 2023.  
- Follow Up Plan BMI Management satisfied 2023.  
  
  
[facility]- Evaluation note  
  
Includes: Assessments for all patient encounters  
  
  
  
                                Findings        Encounter       Date  
   
                                        Assessment of body mass index Medical Es  
tablished Patient with   
Doreen Cabrera [facility]                        10/21/2022  
   
                                                    Bipolar affective disorder,   
current   
episode manic                            Telebehavioral Health with Haylie Martinerson [facility]                        2022  
   
                                                    Bipolar affective disorder,   
current   
episode manic                            Established Patient with Haylie Martinerson [facility]                        2022  
   
                                                    Bipolar affective disorder,   
current   
episode depressed, severe with   
psychosis                                Telebehavioral Health with Haylie Martinerson [facility]                        2022  
   
                                                    Assessment of body mass inde  
x [Body   
mass index [BMI] 50.0-59.9, adult]      Open Access - Established with   
Julieth Pisano CNP                        2022  
   
                                                    Bipolar I disorder, most rec  
ent   
episode, manic                          Open Access - Established with   
Julieth Aliya CNP                        2022  
   
                                        Post-traumatic stress disorder  Establ  
ished Patient with Haylie Martinerson [facility]                        2022  
   
                                        No cough            Medical Established   
Patient with   
Doreen Cabrera [facility]                        2022  
   
                                                    Z68.43 - Body mass index [BM  
I]   
50.0-59.9, adult                        Medical Established Patient with   
Doreen Mccormacken [facility]                        2022  
   
                                                    Borderline personality disor  
danny Pt   
reported hx of sx/dx                     Established Patient with Haylie Aguilar [facility]                        2022  
   
                                                    Assessment of body mass inde  
x [Body   
mass index [BMI] 50.0-59.9, adult]      Open Access - Established with   
Julieth Aliya CNP                        2022  
   
                                                    Diabetes Risk Test Score was  
 three   
score 2022                         Open Access - Established with   
Julieth Aliya CNP                        2022  
   
                                                    Bipolar I disorder, most rec  
ent   
episode, manic                           [facility]   
[facility]  
   
                                        Borderline personality disorder  Estab  
lished Patient with   
[facility]                2021  
   
                                        Post-traumatic stress disorder  Establ  
ished Patient [facility]   
[facility]  
   
                                                    Assessment of visit for: bhargavi myles for   
human immunodeficiency virus            Medical Established Patient with   
Doreen Deborah CNP                        2021  
   
                                        Nicotine dependence Medical Established   
Patient with   
Doreen Deborah [facility]                        2021  
   
                                        Tachycardia         Medical Established   
Patient with   
Doreen Deborah [facility]                        2021  
   
                                                    Z68.43 - Body mass index [BM  
I]   
50.0-59.9, adult                        Medical Established Patient with   
Doreen Deborah [facility]                        2021  
   
                                                    Bipolar I disorder, most rec  
ent   
episode, manic                           Established Patient with Leonie   
[facility]                             2021  
   
                                                    Borderline personality disor  
danny per   
patient reported history                 Established Patient with Leonie   
[facility]                             2021  
   
                                        Nicotine dependence  Established Patie  
nt with Leonie   
[facility]                             2021  
   
                                        Post-traumatic stress disorder  Establ  
ished Patient with Leonie   
[facility]                             2021  
   
                                        Body mass index     Medical Established   
Patient with   
Doreen Deborah [facility]                        2021  
   
                                        Morbid obesity      Medical Established   
Patient with   
Doreen Deborah [facility]                        2021  
   
                                        Nicotine dependence uncomplicated Medica  
l Established Patient with   
Doreen Deborah [facility]                        2021  
   
                                                    Z68.42 - Body mass index [BM  
I]   
45.0-49.9, adult                        Medical Established Patient with   
Doreen Deborah CNP                        2021  
   
                                Episodic mood disorders On Call with Pamela edwards CNP 2021  
   
                                                    Mood disorders, NOS per tabatha  
ent   
reported history                         Established Patient with Leonie   
[facility]                             2021  
   
                                        Post-traumatic stress disorder BH Establ  
ished Patient with Leonie   
[facility]                             2021  
   
                                        Morbid obesity      Medical Established   
Patient with   
Doreen Deborah CNP                        2021  
   
                                        Otitis externa      Medical Established   
Patient with   
Doreen Deborah CNP                        2021  
   
                                                    Z68.42 - Body mass index [BM  
I]   
45.0-49.9, adult                        Medical Established Patient with   
Doreen Deborah CNP                        2021  
   
                                        Post-traumatic stress disorder BH Establ  
ished Patient with Leonie   
[facility]                             06/10/2021  
   
                                        Morbid obesity      Medical Established   
Patient with   
Doreen Deborah CNP                        06/10/2021  
   
                                                    Z68.42 - Body mass index [BM  
I]   
45.0-49.9, adult                        Medical Established Patient with   
Doreen Deborah CNP                        06/10/2021  
   
                                        Post-traumatic stress disorder BH Establ  
ished Patient with Leonie   
[facility]                             2021  
   
                                                    [facility]   
[facility]            Medical New Patient with Doreen   
Deborah CNP                              2021  
   
                                                    Diabetes Risk Test Score was  
 one score   
2021                               Medical New Patient with Doreen   
Deborah CNP                              2021  
   
                                        Hypertension        Medical New Patient   
with Doreen   
Deborah CNP                              2021  
   
                                        Morbid obesity      Medical New Patient   
with Doreen   
Deborah CNP                              2021  
   
                                        Post-traumatic stress disorder Medical N  
ew Patient with Doreen   
Deborah CNP                              2021  
   
                                                    Z68.42 - Body mass index [BM  
I]   
45.0-49.9, adult                        Medical New Patient with Doreen   
Deborah [facility]                              2021  
  
[facility]  
Work Phone: [phone]- Evaluation note  
  
Includes: Assessments for all patient encounters  
  
  
  
                                Findings        Encounter       Date  
   
                                                    Bipolar affective disorder,   
current   
episode manic                            Telebehavioral Health with Haylie Aguilar [facility]                        2022  
   
                                                    Bipolar affective disorder,   
current   
episode manic                            Established Patient with Haylienicanor Aguilar [facility]                        2022  
   
                                                    Bipolar affective disorder,   
current   
episode depressed, severe with   
psychosis                                Telebehavioral Health with Haylie Aguilar [facility]                        2022  
   
                                                    Assessment of body mass inde  
x [Body   
mass index [BMI] 50.0-59.9, adult]      Open Access - Established with   
Julieth Pisano CNP                        2022  
   
                                                    Bipolar I disorder, most rec  
ent   
episode, manic                          Open Access - Established with   
Julieth Aliya CNP                        2022  
   
                                        Post-traumatic stress disorder  Establ  
ished Patient with Haylie   
[facility]                        2022  
   
                                        No cough            Medical Established   
Patient with   
Doreen Cabrera CNP                        2022  
   
                                                    Z68.43 - Body mass index [BM  
I]   
50.0-59.9, adult                        Medical Established Patient with   
Doreenpaola Cabrera CNP                        2022  
   
                                                    Borderline personality disor  
danny Pt   
reported hx of sx/dx                     Established Patient with Haylie   
[facility]gu[facility]                        2022  
   
                                                    Assessment of body mass inde  
x [Body   
mass index [BMI] 50.0-59.9, adult]      Open Access - Established with   
Julieth Aliya CNP                        2022  
   
                                                    Diabetes Risk Test Score was  
 three   
score 2022                         Open Access - Established with   
Julieth Aliya CNP                        2022  
   
                                                    Bipolar I disorder, most rec  
ent   
episode, manic                           Established Patient with   
[facility]vis [facility]der [facility]                2021  
   
                                        Borderline personality disorder  Estab  
lished Patient with   
[facility]vis Felder [facility]                2021  
   
                                        Post-traumatic stress disorder  Establ  
ished Patient with   
Avis Felder [facility]                2021  
   
                                                    Assessment of visit for: bhargavi myles for   
human immunodeficiency virus            Medical Established Patient with   
Doreen Deborah CNP                        2021  
   
                                        Nicotine dependence Medical Established   
Patient with   
Doreen Deborah CNP                        2021  
   
                                        Tachycardia         Medical Established   
Patient with   
Doreen Deborah [facility]                        2021  
   
                                                    Z68.43 - Body mass index [BM  
I]   
50.0-59.9, adult                        Medical Established Patient with   
Doreen Deborah CNP                        2021  
   
                                                    Bipolar I disorder, most rec  
ent   
episode, manic                           Established Patient with Leonie   
[facility]                             2021  
   
                                                    Borderline personality disor  
danny per   
patient reported history                BH Established Patient with Leonie   
[facility]                             2021  
   
                                        Nicotine dependence  Established Patie  
nt with Leonie   
[facility]                             2021  
   
                                        Post-traumatic stress disorder  Establ  
ished Patient with Leonie   
[facility]                             2021  
   
                                        Body mass index     Medical Established   
Patient with   
Doreen Deborah CNP                        2021  
   
                                        Morbid obesity      Medical Established   
Patient with   
Doreen Deborah CNP                        2021  
   
                                        Nicotine dependence uncomplicated Medica  
l Established Patient with   
Doreen Deborah CNP                        2021  
   
                                                    Z68.42 - Body mass index [BM  
I]   
45.0-49.9, adult                        Medical Established Patient with   
Doreen Deborah CNP                        2021  
   
                                Episodic mood disorders On Call with Pamela edwards CNP 2021  
   
                                                    Mood disorders, NOS per tabatha  
ent   
reported history                         Established Patient with Leonie   
[facility]                             2021  
   
                                        Post-traumatic stress disorder  Establ  
ished Patient with Leonie   
[facility]                             2021  
   
                                        Morbid obesity      Medical Established   
Patient with   
Doreen Deborah CNP                        2021  
   
                                        Otitis externa      Medical Established   
Patient with   
Doreen Deborah CNP                        2021  
   
                                                    Z68.42 - Body mass index [BM  
I]   
45.0-49.9, adult                        Medical Established Patient with   
Doreen Deborah CNP                        2021  
   
                                        Post-traumatic stress disorder  Establ  
ished Patient with Leonie   
[facility]                             06/10/2021  
   
                                        Morbid obesity      Medical Established   
Patient with   
Doreen Deborah CNP                        06/10/2021  
   
                                                    Z68.42 - Body mass index [BM  
I]   
45.0-49.9, adult                        Medical Established Patient with   
Doreen Deborah CNP                        06/10/2021  
   
                                        Post-traumatic stress disorder  Establ  
ished Patient with Leonie   
[facility]                             2021  
   
                                                    Assessment of visit for: bhargavi myles for   
human immunodeficiency virus            Medical New Patient with Doreen   
Deborah [facility]                              2021  
   
                                                    Diabetes Risk Test Score was  
 one score   
2021                               Medical New Patient with Doreen   
Deborah [facility]                              2021  
   
                                        Hypertension        Medical New Patient   
with Doreen   
Deborah CNP                              2021  
   
                                        Morbid obesity      Medical New Patient   
with Doreen   
Deborah [facility]                              2021  
   
                                        Post-traumatic stress disorder Medical N  
ew Patient with Doreen   
Deborah [facility]                              2021  
   
                                                    Z68.42 - Body mass index [BM  
I]   
45.0-49.9, adult                        Medical New Patient with Doreen   
Deborah [facility]                              2021  
  
[facility]  
Work Phone: [phone]- Evaluation note  
  
Includes: Assessments for all patient encounters  
  
  
  
                                Findings        Encounter       Date  
   
                                                    Bipolar affective disorder,   
current   
episode depressed, severe with   
psychosis                               BH Telebehavioral Health with Haylie [facility]ilar [facility]                        2022  
   
                                                    Assessment of body mass inde  
x [Body   
mass index [BMI] 50.0-59.9, adult]      Open Access - Established with   
Julieth Aliya CNP                        2022  
   
                                        Post-traumatic stress disorder  Establ  
ished Patient with Haylienicanor Aguilar [facility]                        2022  
   
                                        No cough            Medical Established   
Patient with   
Doreenpaola Mccormacken CNP                        2022  
   
                                                    Z68.43 - Body mass index [BM  
I]   
50.0-59.9, adult                        Medical Established Patient with   
Doreen Deborah CNP                        2022  
   
                                                    Borderline personality disor  
danny Pt   
reported hx of sx/dx                     Established Patient with Haylie [facility]                        2022  
   
                                                    Assessment of body mass inde  
x [Body   
mass index [BMI] 50.0-59.9, adult]      Open Access - Established with   
Julieht Aliya CNP                        2022  
   
                                                    Diabetes Risk Test Score was  
 three   
score 2022                         Open Access - Established with   
Julieth Aliya CNP                        2022  
   
                                                    Bipolar I disorder, most rec  
ent   
episode, manic                           Established Patient with   
[facility]vis [facility]der [facility]                2021  
   
                                        Borderline personality disorder  Estab  
lished Patient with   
Avis Felder [facility]                2021  
   
                                        Post-traumatic stress disorder  Establ  
ished Patient with   
Avis Felder [facility]                2021  
   
                                                    Assessment of visit for: bhargavi myles for   
human immunodeficiency virus            Medical Established Patient with   
Doreen Deborah CNP                        2021  
   
                                        Nicotine dependence Medical Established   
Patient with   
Doreen Deborah CNP                        2021  
   
                                        Tachycardia         Medical Established   
Patient with   
Doreen Deborah [facility]                        2021  
   
                                                    Z68.43 - Body mass index [BM  
I]   
50.0-59.9, adult                        Medical Established Patient with   
Doreen Deborah CNP                        2021  
   
                                                    Bipolar I disorder, most rec  
ent   
episode, manic                           Established Patient with Leonie   
[facility]                             2021  
   
                                                    Borderline personality disor  
danny per   
patient reported history                 Established Patient with Leonie   
[facility]                             2021  
   
                                        Nicotine dependence  Established Patie  
nt with Leonie   
[facility]                             2021  
   
                                        Post-traumatic stress disorder BH Establ  
ished Patient with Leonie   
[facility]                             2021  
   
                                        Body mass index     Medical Established   
Patient with   
Doreen Deborah CNP                        2021  
   
                                        Morbid obesity      Medical Established   
Patient with   
Doreen Deborah CNP                        2021  
   
                                        Nicotine dependence uncomplicated Medica  
l Established Patient with   
Doreen Deborah CNP                        2021  
   
                                                    Z68.42 - Body mass index [BM  
I]   
45.0-49.9, adult                        Medical Established Patient with   
Doreen Deborah CNP                        2021  
   
                                Episodic mood disorders On Call with Pamela edwards CNP 2021  
   
                                                    Mood disorders, NOS per tabatha  
ent   
reported history                        BH Established Patient with Leonie   
[facility]                             2021  
   
                                        Post-traumatic stress disorder BH Establ  
ished Patient with Leonie   
[facility]                             2021  
   
                                        Morbid obesity      Medical Established   
Patient with   
Doreen Deborah CNP                        2021  
   
                                        Otitis externa      Medical Established   
Patient with   
Doreen Deborah CNP                        2021  
   
                                                    Z68.42 - Body mass index [BM  
I]   
45.0-49.9, adult                        Medical Established Patient with   
Doreen Deborah CNP                        2021  
   
                                        Post-traumatic stress disorder BH Establ  
ished Patient with Leonie   
[facility]                             06/10/2021  
   
                                        Morbid obesity      Medical Established   
Patient with   
Doreen Deborah CNP                        06/10/2021  
   
                                                    Z68.42 - Body mass index [BM  
I]   
45.0-49.9, adult                        Medical Established Patient with   
Doreen Deborah CNP                        06/10/2021  
   
                                        Post-traumatic stress disorder BH Establ  
ished Patient with Leonie   
[facility]                             2021  
   
                                                    Assessment of visit for: bhargavi myles for   
human immunodeficiency virus            Medical New Patient with Doreen   
Deborah CNP                              2021  
   
                                                    Diabetes Risk Test Score was  
 one score   
2021                               Medical New Patient with Doreen   
Deborah CNP                              2021  
   
                                        Hypertension        Medical New Patient   
with Doreen   
Deborah CNP                              2021  
   
                                        Morbid obesity      Medical New Patient   
with Doreen   
Deborah CNP                              2021  
   
                                        Post-traumatic stress disorder Medical N  
ew Patient with Doreen   
Deborah CNP                              2021  
   
                                                    Z68.42 - Body mass index [BM  
I]   
45.0-49.9, adult                        Medical New Patient with Doreen   
Deborah CNP                              2021  
  
[facility]  
Work Phone: [phone]- Evaluation note  
  
Includes: Assessments for all patient encounters  
  
  
  
                                Findings        Encounter       Date  
   
                                                    Bipolar affective disorder,   
current   
episode depressed, severe with   
psychosis                                Telebehavioral Health with [facility]lienicanor [facility]ilar [facility]                        2022  
   
                                                    Assessment of body mass inde  
x [Body   
mass index [BMI] 50.0-59.9, adult]      Open Access - Established with   
Julieth Aliya CNP                        2022  
   
                                                    Bipolar I disorder, most rec  
ent   
episode, manic                          Open Access - Established with   
Julieth Aliya CNP                        2022  
   
                                        Post-traumatic stress disorder  Establ  
[facility]  
   
                                        No cough            Medical Established   
Patient with   
Doreen Deborah CNP                        2022  
   
                                                    Z68.43 - Body mass index [BM  
I]   
50.0-59.9, adult                        Medical Established Patient with   
Doreenpaola Cabrera CNP                        2022  
   
                                                    Borderline personality disor  
danny Pt   
reported hx of sx/dx                     [facility]  
   
                                                    Assessment of body mass inde  
x [Body   
mass index [BMI] 50.0-59.9, adult]      Open Access - Established with   
Julieth Aliya CNP                        2022  
   
                                                    Diabetes Risk Test Score was  
 three   
score 2022                         Open Access - Established with   
Julieth Aliya CNP                        2022  
   
                                                    Bipolar I disorder, most rec  
ent   
episode, manic                           [facility]   
[facility]                [facility]  
   
                                        Borderline personality disorder  Estab  
lished Patient with   
[facility]                2021  
   
                                        Post-traumatic stress disorder  Establ  
ished Patient with   
[facility]                2021  
   
                                                    Assessment of visit for: bhargavi myles for   
human immunodeficiency virus            Medical Established Patient with   
Doreen Deborah CNP                        2021  
   
                                        Nicotine dependence Medical Established   
Patient with   
Doreen Deborah CNP                        2021  
   
                                        Tachycardia         Medical Established   
Patient with   
Doreen Deborah CNP                        2021  
   
                                                    Z68.43 - Body mass index [BM  
I]   
50.0-59.9, adult                        Medical Established Patient with   
Doreen Deborah CNP                        2021  
   
                                                    Bipolar I disorder, most rec  
ent   
episode, manic                          BH Established Patient with Leonie   
[facility]                             2021  
   
                                                    Borderline personality disor  
danny per   
patient reported history                BH Established Patient with Leonie   
[facility]                             2021  
   
                                        Nicotine dependence BH Established Patie  
nt with Leonie   
[facility]                             2021  
   
                                        Post-traumatic stress disorder BH Establ  
ished Patient with Leonie   
[facility]                             2021  
   
                                        Body mass index     Medical Established   
Patient with   
Doreen Deborah CNP                        2021  
   
                                        Morbid obesity      Medical Established   
Patient with   
Doreen Deborah CNP                        2021  
   
                                        Nicotine dependence uncomplicated Medica  
l Established Patient with   
Doreen Deborah CNP                        2021  
   
                                                    Z68.42 - Body mass index [BM  
I]   
45.0-49.9, adult                        Medical Established Patient with   
Droeen Deborah CNP                        2021  
   
                                Episodic mood disorders On Call with Pamela edwards CNP 2021  
   
                                                    Mood disorders, NOS per tabatha  
ent   
reported history                        BH Established Patient with Leonie   
[facility]                             2021  
   
                                        Post-traumatic stress disorder BH Establ  
ished Patient with Leonie   
[facility]                             2021  
   
                                        Morbid obesity      Medical Established   
Patient with   
Doreen Deborah CNP                        2021  
   
                                        Otitis externa      Medical Established   
Patient with   
Doreen Deborah CNP                        2021  
   
                                                    Z68.42 - Body mass index [BM  
I]   
45.0-49.9, adult                        Medical Established Patient with   
Doreen Deborah CNP                        2021  
   
                                        Post-traumatic stress disorder BH Establ  
ished Patient with Leonie   
[facility]                             06/10/2021  
   
                                        Morbid obesity      Medical Established   
Patient with   
Doreen Deborah CNP                        06/10/2021  
   
                                                    Z68.42 - Body mass index [BM  
I]   
45.0-49.9, adult                        Medical Established Patient with   
Doreen Deborah CNP                        06/10/2021  
   
                                        Post-traumatic stress disorder BH Establ  
ished Patient with Leonie   
[facility]                             2021  
   
                                                    Assessment of visit for: bhargavi myles for   
human immunodeficiency virus            Medical New Patient with Doreen   
Deborah CNP                              2021  
   
                                                    Diabetes Risk Test Score was  
 one score   
2021                               Medical New Patient with Doreen   
Deborah CNP                              2021  
   
                                        Hypertension        Medical New Patient   
with Doreen   
Deborah CNP                              2021  
   
                                        Morbid obesity      Medical New Patient   
with Doreen   
Deborah CNP                              2021  
   
                                        Post-traumatic stress disorder Medical N  
ew Patient with Doreen Cabrera CNP                              2021  
   
                                                    Z68.42 - Body mass index [BM  
I]   
45.0-49.9, adult                        Medical New Patient with Doreen Cabrera CNP                              2021  
  
[facility]  
Work Phone: [phone]- Evaluation note  
  
Includes: Assessments for all patient encounters  
  
  
  
                                Findings        Encounter       Date  
   
                                                    Bipolar affective disorder,   
current   
episode manic                            Established Patient with [facility]                        2022  
   
                                                    Bipolar affective disorder,   
current   
episode depressed, severe with   
psychosis                                [facility] with [facility]ican[facility]                        2022  
   
                                                    Assessment of body mass inde  
x [Body   
mass index [BMI] 50.0-59.9, adult]      Open Access - Established with   
Julieth Aliya CNP                        2022  
   
                                                    Bipolar I disorder, most rec  
ent   
episode, manic                          Open Access - Established with   
Julieth Aliya CNP                        2022  
   
                                        Post-traumatic stress disorder  Establ  
ished Patient with [facility]icanor [facility]                        2022  
   
                                        No cough            Medical Established   
Patient with   
Doreenpaola Cabrera [facility]                        2022  
   
                                                    Z68.43 - Body mass index [BM  
I]   
50.0-59.9, adult                        Medical Established Patient with   
Doreenpaola Cabrera CNP                        2022  
   
                                                    Borderline personality disor  
danny Pt   
reported hx of sx/dx                     Established Patient with [facility]                        2022  
   
                                                    Assessment of body mass inde  
x [Body   
mass index [BMI] 50.0-59.9, adult]      Open Access - Established with   
Julieth Aliya CNP                        2022  
   
                                                    Diabetes Risk Test Score was  
 three   
score 2022                         Open Access - Established with   
Julieth Aliya CNP                        2022  
   
                                                    Bipolar I disorder, most rec  
ent   
episode, manic                           Established Patient with   
Avissharmila Mcgees [facility]                2021  
   
                                        Borderline personality disorder  Estab  
lished Patient with   
[facility]vis Felder [facility]                2021  
   
                                        Post-traumatic stress disorder  Establ  
ished Patient with   
[facility]vis [facility]der [facility]                2021  
   
                                                    Assessment of visit for: bhargavi myles for   
human immunodeficiency virus            Medical Established Patient with   
Doreenpaola Mccormacken CNP                        2021  
   
                                        Nicotine dependence Medical Established   
Patient with   
Doreen Deborah CNP                        2021  
   
                                        Tachycardia         Medical Established   
Patient with   
Doreen Deborah CNP                        2021  
   
                                                    Z68.43 - Body mass index [BM  
I]   
50.0-59.9, adult                        Medical Established Patient with   
Doreen Deborah CNP                        2021  
   
                                                    Bipolar I disorder, most rec  
ent   
episode, manic                           Established Patient with Leonie   
[facility]                             2021  
   
                                                    Borderline personality disor  
danny per   
patient reported history                 Established Patient with Leonie   
[facility]                             2021  
   
                                        Nicotine dependence BH Established Patie  
nt with Leonie   
[facility]                             2021  
   
                                        Post-traumatic stress disorder  Establ  
ished Patient with Leonie   
[facility]                             2021  
   
                                        Body mass index     Medical Established   
Patient with   
Doreen Deborah CNP                        2021  
   
                                        Morbid obesity      Medical Established   
Patient with   
Doreen Deborah CNP                        2021  
   
                                        Nicotine dependence uncomplicated Medica  
l Established Patient with   
Doreen Deborah CNP                        2021  
   
                                                    Z68.42 - Body mass index [BM  
I]   
45.0-49.9, adult                        Medical Established Patient with   
Doreen Deborah CNP                        2021  
   
                                Episodic mood disorders On Call with Pamela edwards CNP 2021  
   
                                                    Mood disorders, NOS per tabatha  
ent   
reported history                         Established Patient with Leonie   
[facility]                             2021  
   
                                        Post-traumatic stress disorder  Establ  
ished Patient with Leonie   
[facility]                             2021  
   
                                        Morbid obesity      Medical Established   
Patient with   
Doreen Deborah CNP                        2021  
   
                                        Otitis externa      Medical Established   
Patient with   
Doreen Deborah CNP                        2021  
   
                                                    Z68.42 - Body mass index [BM  
I]   
45.0-49.9, adult                        Medical Established Patient with   
Doreen Deborah CNP                        2021  
   
                                        Post-traumatic stress disorder BH Establ  
ished Patient with Leonie   
Short [facility]                             06/10/2021  
   
                                        Morbid obesity      Medical Established   
Patient with   
Doreen Deborah CNP                        06/10/2021  
   
                                                    Z68.42 - Body mass index [BM  
I]   
45.0-49.9, adult                        Medical Established Patient with   
Doreen Deborah CNP                        06/10/2021  
   
                                        Post-traumatic stress disorder  Establ  
ished Patient with Leonie   
Short [facility]                             2021  
   
                                                    Assessment of visit for: scr  
eening for   
human immunodeficiency virus            Medical New Patient with Doreenpaola Cabrera [facility]                              2021  
   
                                                    Diabetes Risk Test Score was  
 one score   
2021                               Medical New Patient with Doreen   
Deborah [facility]                              2021  
   
                                        Hypertension        Medical New Patient   
with Doreen   
Deborah [facility]                              2021  
   
                                        Morbid obesity      Medical New Patient   
with Doreenpaola Cabrera CNP                              2021  
   
                                        Post-traumatic stress disorder Medical N  
ew Patient with Doreen   
Deborah [facility]                              2021  
   
                                                    Z68.42 - Body mass index [BM  
I]   
45.0-49.9, adult                        Medical New Patient with Doreenpaola Cabrera [facility]                              2021  
  
[facility]  
Work Phone: [phone]- Evaluation note  
  
Includes: Assessments for all patient encounters  
  
  
  
                                Findings        Encounter       Date  
   
                                        Post-traumatic stress disorder  Establ  
ished Patient with [facility]icanor [facility]erson [facility]                        2022  
   
                                        No cough            Medical Established   
Patient with   
Doreen Deborah [facility]                        2022  
   
                                                    Z68.43 - Body mass index [BM  
I]   
50.0-59.9, adult                        Medical Established Patient with   
Doreenpaola Mccormacken CNP                        2022  
   
                                                    Borderline personality disor  
danny Pt   
reported hx of sx/dx                     Established Patient with [facility]icanor [facility]                        2022  
   
                                                    Assessment of body mass inde  
x [Body mass   
index [BMI] 50.0-59.9, adult]           Open Access - Established with   
Julieth Pisano CNP                        2022  
   
                                                    Diabetes Risk Test Score was  
 three score   
2022                               Open Access - Established with   
Julieth Aliya CNP                        2022  
   
                                                    Bipolar I disorder, most rec  
ent episode,   
manic                                    Established Patient with   
[facility]vis [facility]der [facility]                2021  
   
                                        Borderline personality disorder  Estab  
lished Patient with   
Avis Felder [facility]                2021  
   
                                        Post-traumatic stress disorder  Establ  
ished Patient with   
Avis Felder [facility]                2021  
   
                                                    Assessment of visit for: scr  
eening for   
human immunodeficiency virus            Medical Established Patient with   
Doreen Deborah CNP                        2021  
   
                                        Nicotine dependence Medical Established   
Patient with   
Doreen Deborah CNP                        2021  
   
                                        Tachycardia         Medical Established   
Patient with   
Doreen Deborah CNP                        2021  
   
                                                    Z68.43 - Body mass index [BM  
I]   
50.0-59.9, adult                        Medical Established Patient with   
Doreen Deborah CNP                        2021  
   
                                                    Bipolar I disorder, most rec  
ent episode,   
manic                                    Established Patient with   
Leonie Short [facility]                     2021  
   
                                                    Borderline personality disor  
danny per   
patient reported history                BH Established Patient with   
Leonie [facility]                     2021  
   
                                        Nicotine dependence BH Established Patie  
nt with   
Leonie Short [facility]                     2021  
   
                                        Post-traumatic stress disorder  Establ  
ished Patient with   
Leonie Short [facility]                     2021  
   
                                        Body mass index     Medical Established   
Patient with   
Doreen Deborah CNP                        2021  
   
                                        Morbid obesity      Medical Established   
Patient with   
Doreen Deborah CNP                        2021  
   
                                        Nicotine dependence uncomplicated Medica  
l Established Patient with   
Doreen Deborah CNP                        2021  
   
                                                    Z68.42 - Body mass index [BM  
I]   
45.0-49.9, adult                        Medical Established Patient with   
Doreen Cabrera [facility]                        2021  
   
                                Episodic mood disorders On Call with Pamela edwards CNP 2021  
   
                                                    Mood disorders, NOS per tabatha  
ent reported   
history                                  Established Patient with   
Leonie Short [facility]                     2021  
   
                                        Post-traumatic stress disorder  Establ  
ished Patient with   
Leonie Short [facility]                     2021  
   
                                        Morbid obesity      Medical Established   
Patient with   
Doreen Deborah CNP                        2021  
   
                                        Otitis externa      Medical Established   
Patient with   
Doreen Cabrera CNP                        2021  
   
                                                    Z68.42 - Body mass index [BM  
I]   
45.0-49.9, adult                        Medical Established Patient with   
Doreen Deborah CNP                        2021  
   
                                        Post-traumatic stress disorder  Establ  
ished Patient with   
Leonie Short [facility]                     06/10/2021  
   
                                        Morbid obesity      Medical Established   
Patient with   
Doreen Deborah CNP                        06/10/2021  
   
                                                    Z68.42 - Body mass index [BM  
I]   
45.0-49.9, adult                        Medical Established Patient with   
Doreen Deborah CNP                        06/10/2021  
   
                                        Post-traumatic stress disorder BH Establ  
ished Patient with   
Leonie Short [facility]                     2021  
   
                                                    Assessment of visit for: bhargavi myles for   
human immunodeficiency virus            Medical New Patient with Doreen Cabrera CNP                              2021  
   
                                                    Diabetes Risk Test Score was  
 one score   
2021                               Medical New Patient with Doreen Cabrera CNP                              2021  
   
                                        Hypertension        Medical New Patient   
with Doreen Cabrera [facility]                              2021  
   
                                        Morbid obesity      Medical New Patient   
with Doreen Cabrera [facility]                              2021  
   
                                        Post-traumatic stress disorder Medical N  
ew Patient with Doreen Cabrera CNP                              2021  
   
                                                    Z68.42 - Body mass index [BM  
I]   
45.0-49.9, adult                        Medical New Patient with Doreen Cabrera [facility]                              2021  
  
[facility]  
Work Phone: [phone]- History general Narrative - Reported  
  
Includes: Medical History in patient's chart  
  
  
  
                                        Description         Last Updated  
   
                                        Has sex without a condom 2022  
   
                                        Not planning to have a baby in the next   
12 months 2022  
   
                                        Partners sexually transmitted infection   
status known 2022  
   
                                        Previously pregnant 0 time(s) 2022  
   
                                        No previous hospitalizations 2022  
   
                                        Chronic illness     2021  
   
                                        Exposure to COVID-19 2021  
   
                                        History of gynecologic disorder 20  
21  
   
                                        History of Polycystic Ovarian Syndrome (  
PCOS) 2021  
   
                                        History of anxiety disorder NOS 20  
21  
   
                                        History of migraine headache 2021  
   
                                        History of psychiatric disorders biopola  
r disorder 2021  
  
[facility]  
Work Phone: [phone]- Evaluation note  
  
Includes: Assessments for all patient encounters  
  
  
  
                                Findings        Encounter       Date  
   
                                                    Borderline personality disor  
danny Pt   
reported hx of sx/dx                     [facility]   
[facility]  
   
                                                    Assessment of body mass inde  
x [Body mass   
index [BMI] 50.0-59.9, adult]           Open Access - Established with   
Julieth Pisano CNP                        2022  
   
                                                    Diabetes Risk Test Score was  
 three score   
2022                               Open Access - Established with   
Julieth Pisano CNP                        2022  
   
                                                    Bipolar I disorder, most rec  
ent episode,   
manic                                    Established Patient with   
Avissharmila Mcgees [facility]                2021  
   
                                        Borderline personality disorder  Estab  
lished Patient with   
Avis Felder [facility]                2021  
   
                                        Post-traumatic stress disorder  Establ  
ished Patient with   
Avis Felder [facility]                2021  
   
                                                    Assessment of visit for: bhargavi myles for   
human immunodeficiency virus            Medical Established Patient with   
Doreen Deborah CNP                        2021  
   
                                        Nicotine dependence Medical Established   
Patient with   
Doreen Deborah CNP                        2021  
   
                                        Tachycardia         Medical Established   
Patient with   
Doreen Deborah CNP                        2021  
   
                                                    Z68.43 - Body mass index [BM  
I]   
50.0-59.9, adult                        Medical Established Patient with   
Doreen Deborah CNP                        2021  
   
                                                    Bipolar I disorder, most rec  
ent episode,   
manic                                    Established Patient with   
Leonie Short LISWS                     2021  
   
                                                    Borderline personality disor  
danny per   
patient reported history                 Established Patient with   
Leonie Short LISWS                     2021  
   
                                        Nicotine dependence  Established Patie  
nt with   
Leonie Short LISWS                     2021  
   
                                        Post-traumatic stress disorder  Establ  
ished Patient with   
Leonie Short LISWS                     2021  
   
                                        Body mass index     Medical Established   
Patient with   
Doreen Deborah CNP                        2021  
   
                                        Morbid obesity      Medical Established   
Patient with   
Doreen Deborah CNP                        2021  
   
                                        Nicotine dependence uncomplicated Medica  
l Established Patient with   
Doreen Deborah CNP                        2021  
   
                                                    Z68.42 - Body mass index [BM  
I]   
45.0-49.9, adult                        Medical Established Patient with   
Doreen Deborah CNP                        2021  
   
                                Episodic mood disorders On Call with Pamela edwards CNP 2021  
   
                                                    Mood disorders, NOS per tabatha  
ent reported   
history                                  Established Patient with   
Leonie Short LISWS                     2021  
   
                                        Post-traumatic stress disorder  Establ  
ished Patient with   
Leonie Short LISWS                     2021  
   
                                        Morbid obesity      Medical Established   
Patient with   
Doreen Deborah CNP                        2021  
   
                                        Otitis externa      Medical Established   
Patient with   
Doreen Deborah CNP                        2021  
   
                                                    Z68.42 - Body mass index [BM  
I]   
45.0-49.9, adult                        Medical Established Patient with   
Doreen Deborah CNP                        2021  
   
                                        Post-traumatic stress disorder BH Establ  
ished Patient with   
Leonie Short LISWS                     06/10/2021  
   
                                        Morbid obesity      Medical Established   
Patient with   
Doreen Deborah CNP                        06/10/2021  
   
                                                    Z68.42 - Body mass index [BM  
I]   
45.0-49.9, adult                        Medical Established Patient with   
Doreen Deborah LARISSA                        06/10/2021  
   
                                        Post-traumatic stress disorder BH Establ  
ished Patient with   
Leonie Short LISWS                     2021  
   
                                                    Assessment of visit for: bhargavi myles for   
human immunodeficiency virus            Medical New Patient with Doreen Cabrera LARISSA                              2021  
   
                                                    Diabetes Risk Test Score was  
 one score   
2021                               Medical New Patient with Doreen Cabrera LARISSA                              2021  
   
                                        Hypertension        Medical New Patient   
with Doreen Cabrera LARISSA                              2021  
   
                                        Morbid obesity      Medical New Patient   
with Doreen Cabrera LARISSA                              2021  
   
                                        Post-traumatic stress disorder Medical N  
ew Patient with Doreen Cabrera LARISSA                              2021  
   
                                                    Z68.42 - Body mass index [BM  
I]   
45.0-49.9, adult                        Medical New Patient with Doreen Cabrera LARISSA                              2021  
  
[facility]  
Work Phone: [phone]- Reason for referral (narrative)*   
  
                          Date         Encounter Description Provider     Reason  
 for Referral  
   
                          22      Established Patient Haylie Aguilar LP  
CC-S Referral To Mental Health  
 Team  
   
                          21     Medical New Patient Doreen Cabrera CNP Refe  
rral To Mental Health Team  
  
  
[facility]  
Work Phone: [phone]- History of Present illness Narrative* Rosie Goldberg - 10/04/2021 1:30 PM EDT  
  
Formatting of this note might be different from the original.  
Explained Holter monitor and diary.  
  
  
  
Electronically signed by Rosie Goldberg at 10/04/2021 2:23 PM EDT  
  
  
[facility]  
[facility] Phone: [phone]- Evaluation note  
  
Includes: Assessments for all patient encounters  
  
  
  
                                Findings        Encounter       Date  
   
                                                    Bipolar I disorder, most rec  
ent episode,   
manic                                    Established Patient with   
Avis Felder LPCC-S                2021  
   
                                        Borderline personality disorder  Estab  
lished Patient with   
Avis Felder LPCC-S                2021  
   
                                                    Assessment of visit for: bhargavi myles for   
human immunodeficiency virus            Medical Established Patient with   
Doreen Mccormackobed DIAS                        2021  
   
                                        Nicotine dependence Medical Established   
Patient with   
Doreenpaola Cabrera CNP                        2021  
   
                                        Tachycardia         Medical Established   
Patient with   
Doreen Deborah DIAS                        2021  
   
                                                    Z68.43 - Body mass index [BM  
I]   
50.0-59.9, adult                        Medical Established Patient with   
Doreen Cabrera CNP                        2021  
   
                                                    Bipolar I disorder, most rec  
ent episode,   
manic                                    Established Patient with   
Leonie Short LISWS                     2021  
   
                                                    Borderline personality disor  
danny per   
patient reported history                BH Established Patient with   
Leonie Short LISWS                     2021  
   
                                        Nicotine dependence BH Established Patie  
nt with   
Leonie Short LISWS                     2021  
   
                                        Post-traumatic stress disorder  Establ  
ished Patient with   
Leonie Short LISWS                     2021  
   
                                        Body mass index     Medical Established   
Patient with   
Doreen Mccormacken CNP                        2021  
   
                                        Morbid obesity      Medical Established   
Patient with   
Doreen Deborah CNP                        2021  
   
                                        Nicotine dependence uncomplicated Medica  
l Established Patient with   
Doreen Deborah [facility]                        2021  
   
                                                    Z68.42 - Body mass index [BM  
I]   
45.0-49.9, adult                        Medical Established Patient with   
Doreen Cabrera [facility]                        2021  
   
                                Episodic mood disorders On Call with Pamela edwards CNP 2021  
   
                                                    Mood disorders, NOS per tabatha  
ent reported   
history                                  Established Patient with   
Leonie Short LISWS                     2021  
   
                                        Post-traumatic stress disorder  Establ  
ished Patient with   
Leonie Short LISWS                     2021  
   
                                        Morbid obesity      Medical Established   
Patient with   
Doreen Deborah [facility]                        2021  
   
                                        Otitis externa      Medical Established   
Patient with   
Doreenpaola Cabrera [facility]                        2021  
   
                                                    Z68.42 - Body mass index [BM  
I]   
45.0-49.9, adult                        Medical Established Patient with   
Doreen Deborah CNP                        2021  
   
                                        Post-traumatic stress disorder  Establ  
ished Patient with   
Leonie Short LISWS                     06/10/2021  
   
                                        Morbid obesity      Medical Established   
Patient with   
Doreen Deborah CNP                        06/10/2021  
   
                                                    Z68.42 - Body mass index [BM  
I]   
45.0-49.9, adult                        Medical Established Patient with   
Doreen Deborah CNP                        06/10/2021  
   
                                        Post-traumatic stress disorder BH Establ  
ished Patient with   
Leonie Short LISWS                     2021  
   
                                                    Assessment of visit for: bhargavi myles for   
human immunodeficiency virus            Medical New Patient with Doreen Cabrera CNP                              2021  
   
                                                    Diabetes Risk Test Score was  
 one score   
2021                               Medical New Patient with Doreen   
Deborah CNP                              2021  
   
                                        Hypertension        Medical New Patient   
with Doreen   
Deborah CNP                              2021  
   
                                        Morbid obesity      Medical New Patient   
with Doreen   
Deborah CNP                              2021  
   
                                        Post-traumatic stress disorder Medical N  
ew Patient with Doreen   
Deborah CNP                              2021  
   
                                                    Z68.42 - Body mass index [BM  
I]   
45.0-49.9, adult                        Medical New Patient with Doreen   
Deborah CNP                              2021  
  
[facility]  
Work Phone: [phone]- Evaluation note  
  
Includes: Assessments for all patient encounters  
  
  
  
                                Findings        Encounter       Date  
   
                                                    Bipolar I disorder, most rec  
ent episode,   
manic                                   BH Established Patient with   
Avis Felder LPCC-S                2021  
   
                                        Borderline personality disorder BH Estab  
lished Patient with   
Avis Felder LPCC-S                2021  
   
                                        Post-traumatic stress disorder BH Establ  
ished Patient with   
Avis Felder LPCC-S                2021  
   
                                                    Assessment of visit for: bhargavi myles for   
human immunodeficiency virus            Medical Established Patient with   
Doreen Deborah CNP                        2021  
   
                                        Nicotine dependence Medical Established   
Patient with   
Doreen Deborah CNP                        2021  
   
                                        Tachycardia         Medical Established   
Patient with   
Doreen Deborah CNP                        2021  
   
                                                    Z68.43 - Body mass index [BM  
I]   
50.0-59.9, adult                        Medical Established Patient with   
Doreen Deborah CNP                        2021  
   
                                                    Bipolar I disorder, most rec  
ent episode,   
manic                                    Established Patient with   
Leonie Short LISWS                     2021  
   
                                                    Borderline personality disor  
danny per   
patient reported history                BH Established Patient with   
Leonie Short LISWS                     2021  
   
                                        Nicotine dependence  Established Patie  
nt with   
Leonie Short LISWS                     2021  
   
                                        Post-traumatic stress disorder BH Establ  
ished Patient with   
Leonie Short LISWS                     2021  
   
                                        Body mass index     Medical Established   
Patient with   
Doreen Deborah CNP                        2021  
   
                                        Morbid obesity      Medical Established   
Patient with   
Doreen Deborah CNP                        2021  
   
                                        Nicotine dependence uncomplicated Medica  
l Established Patient with   
Doreen Deborah CNP                        2021  
   
                                                    Z68.42 - Body mass index [BM  
I]   
45.0-49.9, adult                        Medical Established Patient with   
Doreen Deborah CNP                        2021  
   
                                Episodic mood disorders On Call with Pamela edwards CNP 2021  
   
                                                    Mood disorders, NOS per tabatha  
ent reported   
history                                  Established Patient with   
Leonie Short LISWS                     2021  
   
                                        Post-traumatic stress disorder  Establ  
ished Patient with   
Leonie Short LISWS                     2021  
   
                                        Morbid obesity      Medical Established   
Patient with   
Doreen Deborah CNP                        2021  
   
                                        Otitis externa      Medical Established   
Patient with   
Doreen Deborah CNP                        2021  
   
                                                    Z68.42 - Body mass index [BM  
I]   
45.0-49.9, adult                        Medical Established Patient with   
Doreen Deborah CNP                        2021  
   
                                        Post-traumatic stress disorder BH Establ  
ished Patient with   
Leonie Short LISWS                     06/10/2021  
   
                                        Morbid obesity      Medical Established   
Patient with   
Doreen Deborah CNP                        06/10/2021  
   
                                                    Z68.42 - Body mass index [BM  
I]   
45.0-49.9, adult                        Medical Established Patient with   
Doreen Deborah CNP                        06/10/2021  
   
                                        Post-traumatic stress disorder  Establ  
ished Patient with   
Leonie Short LISWS                     2021  
   
                                                    Assessment of visit for: bhargavi myles for   
human immunodeficiency virus            Medical New Patient with Doreen   
Deborah CNP                              2021  
   
                                                    Diabetes Risk Test Score was  
 one score   
2021                               Medical New Patient with Doreen   
Deborah CNP                              2021  
   
                                        Hypertension        Medical New Patient   
with Doreen   
Deborah CNP                              2021  
   
                                        Morbid obesity      Medical New Patient   
with Doreen   
Deborah [facility]                              2021  
   
                                        Post-traumatic stress disorder Medical N  
ew Patient with Doreen   
Deborah CNP                              2021  
   
                                                    Z68.42 - Body mass index [BM  
I]   
45.0-49.9, adult                        Medical New Patient with Doreen   
Deborah [facility]                              2021  
  
[facility]  
Work Phone: [phone]- Evaluation note  
  
Includes: Assessments for all patient encounters  
  
  
  
                                Findings        Encounter       Date  
   
                                                    Bipolar I disorder, most rec  
ent episode,   
manic                                    Established Patient with   
Leonie Short LISWS                     2021  
   
                                                    Borderline personality disor  
danny per   
patient reported history                 Established Patient with   
Leonie Short LISWS                     2021  
   
                                        Nicotine dependence  Established Patie  
nt with   
Leonie Short LISWS                     2021  
   
                                        Post-traumatic stress disorder BH Establ  
ished Patient with   
Leonie Short LISWS                     2021  
   
                                        Body mass index     Medical Established   
Patient with   
Doreen Deborah CNP                        2021  
   
                                        Morbid obesity      Medical Established   
Patient with   
Doreen Deborah CNP                        2021  
   
                                        Nicotine dependence uncomplicated Medica  
l Established Patient with   
Doreen Deborah CNP                        2021  
   
                                                    Z68.42 - Body mass index [BM  
I]   
45.0-49.9, adult                        Medical Established Patient with   
Doreen Deborah CNP                        2021  
   
                                Episodic mood disorders On Call with Pamela edwards CNP 2021  
   
                                                    Mood disorders, NOS per tabatha  
ent reported   
history                                 BH Established Patient with   
Leonie Short LISWS                     2021  
   
                                        Post-traumatic stress disorder BH Establ  
ished Patient with   
Leonie Short LISWS                     2021  
   
                                        Morbid obesity      Medical Established   
Patient with   
Doreen Deborah CNP                        2021  
   
                                        Otitis externa      Medical Established   
Patient with   
Doreen Deborah CNP                        2021  
   
                                                    Z68.42 - Body mass index [BM  
I]   
45.0-49.9, adult                        Medical Established Patient with   
Doreen Deborah CNP                        2021  
   
                                        Post-traumatic stress disorder BH Establ  
ished Patient with   
Leonie Short LISWS                     06/10/2021  
   
                                        Morbid obesity      Medical Established   
Patient with   
Doreen Deborah CNP                        06/10/2021  
   
                                                    Z68.42 - Body mass index [BM  
I]   
45.0-49.9, adult                        Medical Established Patient with   
Doreen Deborah CNP                        06/10/2021  
   
                                        Post-traumatic stress disorder BH Establ  
ished Patient with   
Leonie Short LISWS                     2021  
   
                                                    Assessment of visit for: bhargavi myles for   
human immunodeficiency virus            Medical New Patient with Doreen   
Deborah CNP                              2021  
   
                                                    Diabetes Risk Test Score was  
 one score   
2021                               Medical New Patient with Doreen   
Deborah CNP                              2021  
   
                                        Hypertension        Medical New Patient   
with Doreen   
Deborah CNP                              2021  
   
                                        Morbid obesity      Medical New Patient   
with Doreen   
Deborah CNP                              2021  
   
                                        Post-traumatic stress disorder Medical N  
ew Patient with Doreen   
Deborah CNP                              2021  
   
                                                    Z68.42 - Body mass index [BM  
I]   
45.0-49.9, adult                        Medical New Patient with Doreen   
Deborah CNP                              2021  
  
[facility]  
Work Phone: [phone]- History general Narrative - Reported  
  
Includes: Medical History in patient's chart  
  
  
  
                                        Description         Last Updated  
   
                                        Chronic illness     2021  
   
                                        Exposure to COVID-19 2021  
   
                                        Previous hospitalizations 2021  
   
                                        History of gynecologic disorder 20  
21  
   
                                        History of Polycystic Ovarian Syndrome (  
PCOS) 2021  
   
                                        History of anxiety disorder NOS 20  
21  
   
                                        History of migraine headache 2021  
   
                                        History of psychiatric disorders biopola  
r disorder 2021  
  
[facility]  
Work Phone: [phone]- Evaluation note  
  
Includes: Assessments for all patient encounters  
  
  
  
                                Findings        Encounter       Date  
   
                                Episodic mood disorders On Call with Pamela edwards CNP 2021  
   
                                                    Mood disorders, NOS per tabatha  
ent reported   
history                                  Established Patient with   
Leonie Short LISWS                     2021  
   
                                        Post-traumatic stress disorder  Establ  
ished Patient with   
Leonie Short LISWS                     2021  
   
                                        Morbid obesity      Medical Established   
Patient with   
Doreen Deborah CNP                        2021  
   
                                        Otitis externa      Medical Established   
Patient with   
Doreen Deborah CNP                        2021  
   
                                                    Z68.42 - Body mass index [BM  
I]   
45.0-49.9, adult                        Medical Established Patient with   
Doreen Deborah CNP                        2021  
   
                                        Post-traumatic stress disorder  Establ  
ished Patient with   
Leonie Short LISWS                     06/10/2021  
   
                                        Morbid obesity      Medical Established   
Patient with   
Doreen Deborah CNP                        06/10/2021  
   
                                                    Z68.42 - Body mass index [BM  
I]   
45.0-49.9, adult                        Medical Established Patient with   
Doreen Deborah CNP                        06/10/2021  
   
                                        Post-traumatic stress disorder  Establ  
ished Patient with   
Leonie Short LISWS                     2021  
   
                                                    Assessment of visit for: bhargavi myles for   
human immunodeficiency virus            Medical New Patient with Doreen   
Deborah CNP                              2021  
   
                                                    Diabetes Risk Test Score was  
 one score   
2021                               Medical New Patient with Doreen   
Deborah CNP                              2021  
   
                                        Hypertension        Medical New Patient   
with Doreen   
Deborah CNP                              2021  
   
                                        Morbid obesity      Medical New Patient   
with Doreen   
Deborah CNP                              2021  
   
                                        Post-traumatic stress disorder Medical N  
ew Patient with Doreenpaola Cabrera CNP                              2021  
   
                                                    Z68.42 - Body mass index [BM  
I]   
45.0-49.9, adult                        Medical New Patient with Doreenpaola Cabrera CNP                              2021  
  
[facility]  
Work Phone: [phone]- Evaluation note  
  
Includes: Assessments for all patient encounters  
  
  
  
                                Findings        Encounter       Date  
   
                                                    Mood disorders, NOS per tabatha  
ent reported   
history                                 BH Established Patient with   
Leonie Short LISWS                     2021  
   
                                        Post-traumatic stress disorder BH Establ  
ished Patient with   
Leonie Short LISWS                     2021  
   
                                        Morbid obesity      Medical Established   
Patient with   
Doreen Deborah CNP                        2021  
   
                                        Otitis externa      Medical Established   
Patient with   
Doreen Deborah CNP                        2021  
   
                                                    Z68.42 - Body mass index [BM  
I]   
45.0-49.9, adult                        Medical Established Patient with   
Doreen Deborah CNP                        2021  
   
                                        Post-traumatic stress disorder BH Establ  
ished Patient with   
Leonie Short LISWS                     06/10/2021  
   
                                        Morbid obesity      Medical Established   
Patient with   
Doreen Deborah CNP                        06/10/2021  
   
                                                    Z68.42 - Body mass index [BM  
I]   
45.0-49.9, adult                        Medical Established Patient with   
Doreen Deborah CNP                        06/10/2021  
   
                                        Post-traumatic stress disorder BH Establ  
ished Patient with   
Leonie Short LISWS                     2021  
   
                                                    Assessment of visit for: bhargavi myles for   
human immunodeficiency virus            Medical New Patient with Doreenpaola Cabrera [facility]                              2021  
   
                                                    Diabetes Risk Test Score was  
 one score   
2021                               Medical New Patient with Doreen   
Deborah CNP                              2021  
   
                                        Hypertension        Medical New Patient   
with Doreen   
Deborah CNP                              2021  
   
                                        Morbid obesity      Medical New Patient   
with Doreen   
Deborah [facility]                              2021  
   
                                        Post-traumatic stress disorder Medical N  
ew Patient with Doreenpaola Cabrera [facility]                              2021  
   
                                                    Z68.42 - Body mass index [BM  
I]   
45.0-49.9, adult                        Medical New Patient with Doreenpaola Cabrera [facility]                              2021  
  
[facility]  
Work Phone: [phone]- History general Narrative - Reported  
  
Includes: Medical History in patient's chart  
  
  
  
                                        Description         Last Updated  
   
                                        Exposure to COVID-19 2021  
   
                                        Previous hospitalizations 2021  
   
                                        History of gynecologic disorder 20  
21  
   
                                        History of Polycystic Ovarian Syndrome (  
PCOS) 2021  
   
                                        History of anxiety disorder NOS 20  
21  
   
                                        History of migraine headache 2021  
   
                                        History of psychiatric disorders biopola  
r disorder 2021  
  
[facility]  
Work Phone: [phone]- History general Narrative - Reported  
  
Includes: Medical History in patient's chart  
  
  
  
                                        Description         Last Updated  
   
                                        Chronic illness     2021  
   
                                        Exposure to COVID-19 2021  
   
                                        Previous hospitalizations 2021  
   
                                        History of gynecologic disorder 20  
21  
   
                                        History of Polycystic Ovarian Syndrome (  
PCOS) 2021  
   
                                        History of anxiety disorder NOS 20  
21  
   
                                        History of migraine headache 2021  
   
                                        History of psychiatric disorders biopola  
r disorder 2021  
  
[facility]  
Work Phone: [phone]- Evaluation note  
  
Includes: Assessments for all patient encounters  
  
  
  
                                Findings        Encounter       Date  
   
                                        Morbid obesity      Medical Established   
Patient with   
Doreen Deborah CNP                        06/10/2021  
   
                                                    Z68.42 - Body mass index [BM  
I]   
45.0-49.9, adult                        Medical Established Patient with   
Doreen Deborah CNP                        06/10/2021  
   
                                        Post-traumatic stress disorder  Rebecca biggs Patient with   
Leonie Short LISWS                     2021  
   
                                                    Assessment of visit for: bhargavi guevaraning for   
human immunodeficiency virus            Medical New Patient with Doreen   
Deborah [facility]                              2021  
   
                                                    Diabetes Risk Test Score was  
 one score   
2021                               Medical New Patient with Doreen   
Deborah CNP                              2021  
   
                                        Hypertension        Medical New Patient   
with Doreen   
Deborah CNP                              2021  
   
                                        Morbid obesity      Medical New Patient   
with Doreen   
Deborah CNP                              2021  
   
                                        Post-traumatic stress disorder Medical N  
ew Patient with Doreen   
Deborah CNP                              2021  
   
                                                    Z68.42 - Body mass index [BM  
I]   
45.0-49.9, adult                        Medical New Patient with Doreen   
Deborah CNP                              2021  
  
[facility]  
Work Phone: [phone]- Evaluation note  
  
Includes: Assessments for all patient encounters  
  
  
  
                                Findings        Encounter       Date  
   
                                        Post-traumatic stress disorder  Rebecca biggs Patient with   
Leonie Short LISWS                     2021  
   
                                                    Assessment of visit for: bhargavi myles for   
human immunodeficiency virus            Medical New Patient with Doreen Cabrera CNP                              2021  
   
                                                    Diabetes Risk Test Score was  
 one score   
2021                               Medical New Patient with Doreen Cabrera CNP                              2021  
   
                                        Hypertension        Medical New Patient   
with Doreen Cabrera CNP                              2021  
   
                                        Morbid obesity      Medical New Patient   
with Doreen Cabrera CNP                              2021  
   
                                        Post-traumatic stress disorder Medical N  
ew Patient with Doreen Cabrera CNP                              2021  
   
                                                    Z68.42 - Body mass index [BM  
I]   
45.0-49.9, adult                        Medical New Patient with Doreen Cabrera CNP                              2021  
  
[facility]  
Work Phone: [phone]- History general Narrative - Reported  
  
Includes: Medical History in patient's chart  
  
  
  
                                        Description         Last Updated  
   
                                        History of gynecologic disorder 20  
21  
   
                                        History of Polycystic Ovarian Syndrome (  
PCOS) 2021  
   
                                        History of anxiety disorder NOS 20  
21  
   
                                        History of migraine headache 2021  
   
                                        History of psychiatric disorders biopola  
r disorder 2021  
  
[facility]  
Work Phone: [phone]- History general Narrative - Reported  
  
Includes: Medical History in patient's chart  
  
  
  
                                        Description         Last Updated  
   
                                        Exposure to COVID-19 2021  
   
                                        Previous hospitalizations 2021  
   
                                        History of gynecologic disorder 20  
21  
   
                                        History of Polycystic Ovarian Syndrome (  
PCOS) 2021  
   
                                        History of anxiety disorder NOS 20  
21  
   
                                        History of migraine headache 2021  
   
                                        History of psychiatric disorders biopola  
r disorder 2021  
  
[facility]  
Work Phone: [phone] note*   
  
                                                    Diagnosis  
   
                                                      
  
  
Depression with suicidal ideation- Primary  
  
documented in this encounter  
[facility]  
Work Phone: [phone] note  
  
Includes: Assessments for all patient encounters  
  
  
  
                                Findings        Encounter       Date  
   
                                        Morbid obesity      Medical Established   
Patient with   
Doreen Cabrera CNP                        2021  
   
                                        Otitis externa      Medical Established   
Patient with   
Doreen Cabrera CNP                        2021  
   
                                                    Z68.42 - Body mass index [BM  
I]   
45.0-49.9, adult                        Medical Established Patient with   
Doreen Deborah CNP                        2021  
   
                                        Post-traumatic stress disorder BH Establ  
ished Patient with   
Leonie Short LISWS                     06/10/2021  
   
                                        Morbid obesity      Medical Established   
Patient with   
Doreen Deborah CNP                        06/10/2021  
   
                                                    Z68.42 - Body mass index [BM  
I]   
45.0-49.9, adult                        Medical Established Patient with   
Doreen Deborah CNP                        06/10/2021  
   
                                        Post-traumatic stress disorder BH Establ  
ished Patient with   
Leonie Short LISWS                     2021  
   
                                                    Assessment of visit for: bhargavi myles for   
human immunodeficiency virus            Medical New Patient with Doreen   
Deborah CNP                              2021  
   
                                                    Diabetes Risk Test Score was  
 one score   
2021                               Medical New Patient with Doreen   
Deborah CNP                              2021  
   
                                        Hypertension        Medical New Patient   
with Doreen   
Deborah CNP                              2021  
   
                                        Morbid obesity      Medical New Patient   
with Doreen   
Deborah CNP                              2021  
   
                                        Post-traumatic stress disorder Medical N  
ew Patient with Doreen   
Deborah CNP                              2021  
   
                                                    Z68.42 - Body mass index [BM  
I]   
45.0-49.9, adult                        Medical New Patient with Doreen   
Deborah CNP                              2021  
  
[facility]  
Work Phone: [phone] note*   
  
                                                    Diagnosis  
   
                                                      
  
  
Anxiety state- Primary  
  
  
Anxiety state, unspecified  
  
documented in this encounter  
[employer] Phone: [phone] note*   
  
                                                    Diagnosis  
   
                                                      
  
  
Bipolar 1 disorder (HCC)- Primary  
  
  
Bipolar I disorder, most recent episode (or current) unspecified  
   
                                                      
  
  
Homicidal ideation  
  
documented in this encounter  
[employer] Phone: [phone] note*   
  
                                                    Diagnosis  
   
                                                      
  
  
Tachycardia  
  
  
Tachycardia, unspecified  
  
documented in this encounter  
[employer] Phone: [phone] note  
  
Includes: Assessments for all patient encounters  
  
  
  
                                Findings        Encounter       Date  
   
                                                    [D50.9 - Iron deficiency ane  
jennifer,   
unspecified] iron deficiency anemia Chart Update with Doreen Cabrera CNP   
10/07/2024  
  
  
  
                                                    Last Documented On 10/07/202  
4 12:07PM ; [facility]  
  
  
  
                                        Attention-deficit hyperactivity disorder  
  Established Patient with Radha Young LSW                               2024  
  
  
  
                                                    Last Documented On   
4 4:15PM ; [facility]  
  
  
  
                                                    Bipolar I disorder, most rec  
ent   
episode, depressed - mild                Established Patient with Radha Young LSW                               2024  
  
  
  
                                                    Last Documented On   
4 4:15PM ; [facility]  
  
  
  
                                Nicotine dependence  Established Patient with   
Radha Young LSW 2024  
  
  
  
                                                    Last Documented On   
4 4:15PM ; [facility]  
  
  
  
                                        Post-traumatic stress disorder  Establ  
ished Patient with Radha Young   
LSW                                     2024  
  
  
  
                                                    Last Documented On   
4 4:15PM ; [facility]  
  
  
  
                                                    [Z68.43 - Body mass index [B  
MI]   
50.0-59.9, adult] assessment of body   
mass index                              Medical Established Patient with   
Doreen Deborah CNP                        2024  
  
  
  
                                                    Last Documented On 10/04/202  
4 1:56PM ; [facility]  
  
  
  
                                                    Bipolar I disorder, most rec  
ent   
episode, depressed - mild               Medical Established Patient with Doreen   
Deborah CNP                              2024  
  
  
  
                                                    Last Documented On 10/04/202  
4 1:56PM ; [facility]  
  
  
  
                                Caries          Medical Established Patient with  
 Doreen Deborah CNP 2024  
  
  
  
                                                    Last Documented On 10/04/202  
4 1:56PM ; [facility]  
  
  
  
                                        Encounter for Immunization Medical Estab  
lished Patient with Doreen Deborah   
CNP                                     2024  
  
  
  
                                                    Last Documented On 10/04/202  
4 1:56PM ; [facility]  
  
  
  
                                                    Type 2 diabetes mellitus wit  
hout   
complication                            Medical Established Patient with   
Doreen Deborah CNP                        2024  
  
  
  
                                                    Last Documented On 10/04/202  
4 1:56PM ; [facility]  
  
  
  
                                        Attention-deficit hyperactivity disorder  
  Established Patient with   
Leonie Short LISWS                     2024  
  
  
  
                                                    Last Documented On   
4 3:28PM ; [facility]  
  
  
  
                                                    Bipolar I disorder, most rec  
ent   
episode, depressed - mild                Established Patient with Leonie   
Short LISWS                             2024  
  
  
  
                                                    Last Documented On   
4 3:28PM ; [facility]  
  
  
  
                                        Post-traumatic stress disorder  Establ  
ished Patient with Leonie Short   
LISWS                                   2024  
  
  
  
                                                    Last Documented On   
4 3:28PM ; [facility]  
  
  
  
                                                    [E11.9 - Type 2 diabetes lobito  
litus   
without complications] type 2 diabetes   
mellitus                                Medical Established Patient with   
Doreen Deborah CNP                        2024  
  
  
  
                                                    Last Documented On   
4 7:36PM ; [facility]  
  
  
  
                                                    [F41.1 - Generalized anxiety  
   
disorder] generalized anxiety   
disorder                                Medical Established Patient with   
Doreen Cabrera CNP                        2024  
  
  
  
                                                    Last Documented On   
4 7:36PM ; [facility]  
  
  
  
                                                    [I10 - Essential (primary)   
hypertension] essential hypertension    Medical Established Patient with   
Doreen Cabrera CNP                        2024  
  
  
  
                                                    Last Documented On   
4 7:36PM ; [facility]  
  
  
  
                                                    [N94.6 - Dysmenorrhea, unspe  
cified]   
dysmenorrhea                            Medical Established Patient with   
Doreen Cabrera CNP                        2024  
  
  
  
                                                    Last Documented On   
4 7:36PM ; [facility]  
  
  
  
                                                    [Z68.43 - Body mass index [B  
MI]   
50.0-59.9, adult] assessment of body   
mass index                              Medical Established Patient with   
Doreen Cabrera CNP                        2024  
  
  
  
                                                    Last Documented On   
4 7:36PM ; [facility]  
  
  
  
                                        Encounter for Immunization Medical Estab  
lished Patient with Doreen Cabrera CNP                                     2024  
  
  
  
                                                    Last Documented On   
4 7:36PM ; [facility]  
  
  
  
                                        Venipuncture was performed Medical Estab  
lished Patient with Doreen Cabrera CNP                                     2024  
  
  
  
                                                    Last Documented On   
4 7:36PM ; [facility]  
  
  
  
                                        Attention-deficit hyperactivity disorder  
  Established Patient with   
Leonie Short LISWS                     2024  
  
  
  
                                                    Last Documented On   
4 10:10AM ; [facility]  
  
  
  
                                                    Bipolar I disorder, most rec  
ent   
episode, depressed - mild               BH Established Patient with Leonie   
Short LISWS                             2024  
  
  
  
                                                    Last Documented On   
4 10:10AM ; [facility]  
  
  
  
                                        Post-traumatic stress disorder  Establ  
ished Patient with Leonie Short   
LISWS                                   2024  
  
  
  
                                                    Last Documented On   
4 10:10AM ; [facility]  
  
  
  
                                        [D64.9 - Anemia, unspecified] anemia Med  
ical Established Patient with   
Doreen Cabrera CNP                        2024  
  
  
  
                                                    Last Documented On   
4 6:51PM ; [facility]  
  
  
  
                                                    [Z68.43 - Body mass index [B  
MI]   
50.0-59.9, adult] assessment of body   
mass index                              Medical Established Patient with   
Doreen Cabrera CNP                        2024  
  
  
  
                                                    Last Documented On   
4 6:51PM ; [facility]  
  
  
  
                                                    Bipolar affective disorder,   
current   
episode depressed, mild                  Established Patient with Leonie   
Short LISWS                             2024  
  
  
  
                                                    Last Documented On   
4 5:16PM ; [facility]  
  
  
  
                                        Post-traumatic stress disorder BH Establ  
ished Patient with Leonie Short   
LISWS                                   2024  
  
  
  
                                                    Last Documented On   
4 5:16PM ; [facility]  
  
  
  
                                                    Undifferentiated attention d  
eficit   
disorder                                 Established Patient with   
Leonie Short [facility]                     2024  
  
  
  
                                                    Last Documented On   
4 5:16PM ; [facility]  
  
  
  
                                        Visit for: screening for disorder BH Est  
[facility]                             [facility]  
  
  
  
                                                    Last Documented On   
4 5:16PM ; [facility]  
  
  
  
                                                    [Z68.43 - Body mass index [B  
MI]   
50.0-59.9, adult] assessment of body   
mass index                              Medical Established Patient with   
Doreen Cabrera CNP                        2024  
  
  
  
                                                    Last Documented On   
4 7:50PM ; [facility]  
  
  
  
                                        Attention-deficit hyperactivity disorder  
 Medical Established Patient with   
Doreen Cabrera CNP                        2024  
  
  
  
                                                    Last Documented On   
4 7:50PM ; [facility]  
  
  
  
                                                    Diabetes Risk Test Score was  
 three   
score 3/27/2024                         Medical Established Patient with Doreen Cabrera CNP                              2024  
  
  
  
                                                    Last Documented On   
4 7:50PM ; [facility]  
  
  
  
                                        Visit for: screening for STD Medical Est  
ablished Patient with Doreen Cabrera   
CNP                                     2024  
  
  
  
                                                    Last Documented On   
4 7:50PM ; [facility]  
  
  
  
                                                    [Z68.32 - Body mass index [B  
MI]   
32.0-32.9, adult] assessment of body   
mass index                              Medical Established Patient with   
Doreen Cabrera CNP                        2023  
  
  
  
                                                    Last Documented On   
3 6:01PM ; [facility]  
  
  
  
                                        Borderline personality disorder Medical   
Established Patient with Doreenpaola Cabrera CNP                              2023  
  
  
  
                                                    Last Documented On   
3 6:01PM ; [facility]  
  
  
  
                                        Screening for diabetes mellitus Medical   
Established Patient with Doreenpaola Cabrera CNP                              2023  
  
  
  
                                                    Last Documented On   
3 6:01PM ; [facility]  
  
  
  
                                        Assessment of body mass index Medical Es  
tablished Patient with Doreenpaola Cabrera CNP                              10/21/2022  
  
  
  
                                                    Last Documented On 10/21/202  
2 2:45PM ; [facility]  
  
  
  
                                                    Bipolar affective disorder,   
current   
episode manic                            Telebehavioral Health with Haylie   
Agu[facility]                        2022  
  
  
  
                                                    Last Documented On   
2 3:22PM ; [facility]  
  
  
  
                                                    Bipolar affective disorder,   
current   
episode manic                           BH Established Patient with Haylie   
Aguilar LPCC-S                        2022  
  
  
  
                                                    Last Documented On   
2 2:28PM ; [facility]  
  
  
  
                                                    Bipolar affective disorder,   
current   
episode depressed, severe with   
psychosis                                Telebehavioral Health with Haylie   
Aguilar [facility]-S                        2022  
  
  
  
                                                    Last Documented On   
2 3:29PM ; [facility]  
  
  
  
                                                    Assessment of body mass inde  
x [Body   
mass index [BMI] 50.0-59.9, adult]      Open Access - Established with Julieth   
Aliya CNP                               2022  
  
  
  
                                                    Last Documented On   
2 9:36AM ; [facility]  
  
  
  
                                                    Bipolar I disorder, most rec  
ent   
episode, manic                          Open Access - Established with Julieth   
Aliya CNP                               2022  
  
  
  
                                                    Last Documented On   
2 9:36AM ; [facility]  
  
  
  
                                        Post-traumatic stress disorder BH Establ  
ished Patient with Haylie Aguilar   
LP[facility]-S                                  2022  
  
  
  
                                                    Last Documented On   
2 3:20PM ; [facility]  
  
  
  
                                No cough        Medical Established Patient with  
 Doreen Deborah CNP 2022  
  
  
  
                                                    Last Documented On   
2 4:04PM ; [facility]  
  
  
  
                                                    Z68.43 - Body mass index [BM  
I]   
50.0-59.9, adult                        Medical Established Patient with   
Doreen Deborah CNP                        2022  
  
  
  
                                                    Last Documented On   
2 4:04PM ; [facility]  
  
  
  
                                                    Borderline personality disor  
danny Pt   
reported hx of sx/dx                     Established Patient with Haylie   
Aguilar [facility]-S                        2022  
  
  
  
                                                    Last Documented On   
2 4:08PM ; [facility]  
  
  
  
                                                    Assessment of body mass inde  
x [Body   
mass index [BMI] 50.0-59.9, adult]      Open Access - Established with Julieth   
Aliya CNP                               2022  
  
  
  
                                                    Last Documented On   
2 7:41PM ; [facility]  
  
  
  
                                                    Diabetes Risk Test Score was  
 three   
score 2022                         Open Access - Established with Julieth   
Aliya CNP                               2022  
  
  
  
                                                    Last Documented On   
2 7:41PM ; [facility]  
  
  
  
                                                    Bipolar I disorder, most rec  
ent   
episode, manic                           [facility]   
[facility]                          [facility]  
  
  
  
                                                    Last Documented On   
1 1:35AM ; [facility]  
  
  
  
                                        Borderline personality disorder  Estab  
lished Patient [facility]   
[facility]                          [facility]  
  
  
  
                                                    Last Documented On   
1 1:35AM ; [facility]  
  
  
  
                                        Post-traumatic stress disorder  Establ  
ished Patient [facility]   
[facility]                          20[facility]  
  
  
  
                                                    Last Documented On   
1 1:35AM ; [facility]  
  
  
  
                                                    Assessment of visit for: bhargavi guevaraning for   
human immunodeficiency virus            Medical Established Patient with   
Doreen Deborah [facility]                        2021  
  
  
  
                                                    Last Documented On   
1 2:56PM ; [facility]  
  
  
  
                                Nicotine dependence Medical Established Patient   
with Doreen Deborah CNP 2021  
  
  
  
                                                    Last Documented On   
1 2:56PM ; [facility]  
  
  
  
                                Tachycardia     Medical Established Patient with  
 Doreen Deborah CNP 2021  
  
  
  
                                                    Last Documented On   
1 2:56PM ; [facility]  
  
  
  
                                                    Z68.43 - Body mass index [BM  
I]   
50.0-59.9, adult                        Medical Established Patient with   
Doreen Deborah [facility]                        2021  
  
  
  
                                                    Last Documented On   
1 2:56PM ; [facility]  
  
  
  
                                                    Bipolar I disorder, most rec  
ent   
episode, manic                           Established Patient with Leonie   
Short [facility]                             2021  
  
  
  
                                                    Last Documented On   
1 10:17AM ; [facility]  
  
  
  
                                                    Borderline personality disor  
danny per   
patient reported history                 Established Patient with Leonie   
Short [facility]                             2021  
  
  
  
                                                    Last Documented On   
1 10:17AM ; [facility]  
  
  
  
                                Nicotine dependence BH Established Patient with   
Leonie Short [facility]WS 2021  
  
  
  
                                                    Last Documented On   
1 10:17AM ; [facility]  
  
  
  
                                        Post-traumatic stress disorder  Establ  
ished Patient with Leonie Short   
[facility]                                   2021  
  
  
  
                                                    Last Documented On   
1 10:17AM ; [facility]  
  
  
  
                                Body mass index Medical Established Patient with  
 Doreen Deborah CNP 2021  
  
  
  
                                                    Last Documented On   
1 5:23PM ; [facility]  
  
  
  
                                Morbid obesity  Medical Established Patient with  
 Doreen Deborah CNP 2021  
  
  
  
                                                    Last Documented On   
1 5:23PM ; [facility]  
  
  
  
                                        Nicotine dependence uncomplicated Medica  
l Established Patient with Doreen   
Deborah CNP                              2021  
  
  
  
                                                    Last Documented On   
1 5:23PM ; [facility]  
  
  
  
                                                    Z68.42 - Body mass index [BM  
I]   
45.0-49.9, adult                        Medical Established Patient with   
Doreen Deborah CNP                        2021  
  
  
  
                                                    Last Documented On   
1 5:23PM ; [facility]  
  
  
  
                                Episodic mood disorders On Call with Pamela edwards CNP 2021  
  
  
  
                                                    Last Documented On   
1 7:49AM ; [facility]  
  
  
  
                                                    Mood disorders, NOS per tabatha  
ent   
reported history                         Established Patient with Leonie   
Short [facility]                             2021  
  
  
  
                                                    Last Documented On   
1 7:15PM ; [facility]  
  
  
  
                                        Post-traumatic stress disorder  Establ  
ished Patient with Leonie Short   
[facility]                                   2021  
  
  
  
                                                    Last Documented On   
1 7:15PM ; [facility]  
  
  
  
                                Morbid obesity  Medical Established Patient with  
 Doreen Deborah CNP 2021  
  
  
  
                                                    Last Documented On   
1 12:31PM ; [facility]  
  
  
  
                                Otitis externa  Medical Established Patient with  
 Doreen Deborah CNP 2021  
  
  
  
                                                    Last Documented On   
1 12:31PM ; [facility]  
  
  
  
                                                    Z68.42 - Body mass index [BM  
I]   
45.0-49.9, adult                        Medical Established Patient with   
Doreen Deborah CNP                        2021  
  
  
  
                                                    Last Documented On   
1 12:31PM ; [facility]  
  
  
  
                                        Post-traumatic stress disorder  Establ  
ished Patient with Leonie Short   
[facility]IS[facility]                                   06/10/2021  
  
  
  
                                                    Last Documented On 06/10/202  
1 10:05PM ; [facility]  
  
  
  
                                Morbid obesity  Medical Established Patient with  
 Doreen Deborah CNP 06/10/2021  
  
  
  
                                                    Last Documented On 06/10/202  
1 4:45PM ; [facility]  
  
  
  
                                                    Z68.42 - Body mass index [BM  
I]   
45.0-49.9, adult                        Medical Established Patient with   
Doreen Deborah CNP                        06/10/2021  
  
  
  
                                                    Last Documented On 06/10/202  
1 4:45PM ; [facility]  
  
  
  
                                        Post-traumatic stress disorder  Establ  
ished Patient with Leonie Short   
LISWS                                   2021  
  
  
  
                                                    Last Documented On   
1 11:59AM ; [facility]  
  
  
  
                                                    Assessment of visit for: bhargavi guevaraning for   
human immunodeficiency virus            Medical New Patient with Doreen   
Deborah CNP                              2021  
  
  
  
                                                    Last Documented On   
1 4:06PM ; [facility]  
  
  
  
                                                    Diabetes Risk Test Score was  
 one score   
2021                               Medical New Patient with Doreen   
Deborah CNP                              2021  
  
  
  
                                                    Last Documented On   
1 4:06PM ; [facility]  
  
  
  
                                Hypertension    Medical New Patient with Doreen C  
cate CNP 2021  
  
  
  
                                                    Last Documented On   
1 4:06PM ; [facility]  
  
  
  
                                Morbid obesity  Medical New Patient with Doreen C  
cate CNP 2021  
  
  
  
                                                    Last Documented On   
1 4:06PM ; [facility]  
  
  
  
                                Post-traumatic stress disorder Medical New Patie  
nt with Doreen Deborah CNP   
2021  
  
  
  
                                                    Last Documented On   
1 4:06PM ; [facility]  
  
  
  
                                                    Z68.42 - Body mass index [BM  
I]   
45.0-49.9, adult                        Medical New Patient with Doreen   
Deborah CNP                              2021  
  
  
  
                                                    Last Documented On   
1 4:06PM ; [facility]  
Work Phone: [phone] of Present illness Narrative  
  
History of Present Illness not supported for this document type  
  
No History of Present Illness RecordedHealth [facility]  
Work Phone: [phone] Discharge instructions* Instructions*   
  
Malika Shepherd MD - 2021  
  
  
  
Formatting of this note might be different from the original.  
Please take all medications as prescribed.  
  
Please follow up with your primary care physician by calling today, or as soon 
as possible, for thefirst available appointment. If you do not have a primary 
care physician, please contact a physician or clinic listed below today to 
establish care.  
  
Please return to the emergency department IMMEDIATELY if you develop 
uncontrolled fevers, uncontrolled vomiting, change in symptoms, worsening of 
symptoms, or ANY other concerns.  
  
  
  
  
* Attachments  
  
The following attachments cannot be sent through [facility] Everywhere.  
  
* Anxiety Disorder (English)  
  
[facility]  
Work Phone: [phone]  
  
Instructions not supported for this document type  
  
[facility]  
Work Phone: [phone]  
  
Includes: Instructions for all patient encounters  
  
  
  
                                                    Education and Decision Aids   
were provided during visit for:  
   
                                                    Counseling/education [Use fo  
r free text]  
   
                                                    Last Documented On   
4 7:40PM ; [facility]  
   
                                                    Discussed nutritional needs   
teach healthy choices including fruits and   
vegetables  
   
                                                    Last Documented On   
4 7:14PM ; [facility]  
   
                                                    Patient education about a pr  
oper diet  
   
                                                    Last Documented On   
4 7:14PM ; [facility]  
   
                                                    Discussed concerns about exe  
rcise : promote physical activity  
   
                                                    Last Documented On   
4 7:14PM ; [facility]  
   
                                                    Not requesting contraception  
   
                                                    Last Documented On   
4 7:14PM ; [facility]  
   
                                                    Discussed nutritional needs   
teach healthy choices including fruits and   
vegetables  
   
                                                    Last Documented On   
3 5:15PM ; [facility]  
   
                                                    Patient education about a pr  
oper diet  
   
                                                    Last Documented On   
3 5:15PM ; [facility]  
   
                                                    Discussed concerns about exe  
rcise : promote physical activity  
   
                                                    Last Documented On   
3 5:15PM ; [facility]  
   
                                                    Discussed nutritional needs   
teach healthy choices including fruits and   
vegetables  
   
                                                    Last Documented On 10/21/202  
2 1:42PM ; [facility]  
   
                                                    Patient education about a pr  
oper diet  
   
                                                    Last Documented On 10/21/202  
2 1:42PM ; [facility]  
   
                                                    Discussed concerns about exe  
rcise : promote physical activity  
   
                                                    Last Documented On 10/21/202  
2 1:42PM ; [facility] offered active listening  
 and supportive feedback; normalized emotions and   
feelings, also provided pt time to process any current stressors. ~Promoted and   
encouraged follow-through with scheduling psychiatric services  
   
                                                    Last Documented On   
2 3:21PM ; [facility] provided supportive, empa  
thic listening and reflective feedback. ~Explored,   
encouraged, and supported the pt to discuss current sx/mood, assess risk for   
harm/need, coping mechanisms, support network and safety planning. ~Supported 
pt's   
plan to f/up with Dr. Alonso, as planned at [place], [place]. ~Encouraged 
pt to   
use safety plan, if needed to ensure she remains safe  
   
                                                    Last Documented On   
2 2:27PM ; [facility]  
   
                                                    Discussed nutritional needs   
teach healthy choices including fruits and   
vegetables  
   
                                                    Last Documented On   
2 3:20PM ; [facility]  
   
                                                    Patient education about a pr  
oper diet  
   
                                                    Last Documented On   
2 3:20PM ; [facility]  
   
                                                    Discussed concerns about exe  
rcise : promote physical activity ~ ~Will restart   
trazodone and prazosin ~ ~Patient is planning to have brother stay with her for 
a few   
days for emotional support ~ ~Follow up with PCP at next scheduled visit ~ ~Call
  
psychiatrist office to schedule appt ~ ~Call counselor  
   
                                                    Last Documented On   
2 9:35AM ; [facility]  
   
                                                    Provided supportive listenin  
g and empathic feedback; encouraged, explored, and   
supported the pt as she processed current symptoms, Issues, and concerns. 
~Discussed   
past tx and explored current needs/options. Acknowledged and validated pt's 
thoughts   
and emotions. ~Explored coping mechanisms and support network; utilized 
opportunity   
for safety planning; promoted seeking positive support and seeking help, as 
needed  
   
                                                    Last Documented On   
2 3:28PM ; [facility] provided active listenin  
g, support and helped pt process though current   
symptoms   
and stressor(s). Discussed and explored past effectiveness of medication; 
identified   
objectives and future goals; promoted use of healthy coping mechanisms, and 
self-care   
practices  
   
                                                    Last Documented On   
2 3:19PM ; [facility]  
   
                                                    Reviewed side effects and Ri  
sks/Benefits analysis  
   
                                                    Last Documented On   
2 3:19PM ; [facility]  
   
                                                    Discussed nutritional needs   
teach healthy choices including fruits and   
vegetables  
   
                                                    Last Documented On   
2 3:15PM ; [facility]  
   
                                                    Patient education about a pr  
oper diet  
   
                                                    Last Documented On   
2 3:15PM ; [facility]  
   
                                                    Discussed concerns about exe  
rcise : promote physical activity  
   
                                                    Last Documented On   
2 3:15PM ; [facility] introduced pt to [facility] in  
tegrated model of care ~P offered active listening  
and   
supportive feedback; normalized emotions and feelings, also provided pt time to   
process any current stressors ~P discussed potential benefits of counseling 
and   
supported re-engaging, as needed. ~P encouraged pt to continue to make time to
  
implement self-care regimen and use coping methods, as needed  
   
                                                    Last Documented On   
2 4:07PM ; [facility]  
   
                                                    Discussed nutritional needs   
teach healthy choices including fruits and   
vegetables  
   
                                                    Last Documented On   
2 3:15PM ; [facility]  
   
                                                    Patient education about a pr  
oper diet  
   
                                                    Last Documented On   
2 3:15PM ; [facility]  
   
                                                    Inquiry and counseling about  
 medication administration and compliance  
   
                                                    Last Documented On   
2 7:37PM ; [facility]  
   
                                                    Discussed concerns about exe  
rcise : promote physical activity  
   
                                                    Last Documented On   
2 3:15PM ; [facility]  
   
                                                    Patient goals discussed  
   
                                                    Last Documented On   
2 7:37PM ; [facility]  
   
                                                    Ansewred pt's questions re B  
orderlline Personality D/O and Bipolar D/O raised by  
  
psychiatrist at [place]. ~Validated and normalized patient?s feelings while   
assisting to process recent events  
   
                                                    Last Documented On   
1 1:33AM ; [facility]  
   
                                                    Discussed nutritional needs   
teach healthy choices including fruits and   
vegetables  
   
                                                    Last Documented On   
1 2:04PM ; [facility]  
   
                                                    Patient education about a pr  
oper diet  
   
                                                    Last Documented On   
1 2:04PM ; [facility]  
   
                                                    Discussed concerns about exe  
rcise : promote physical activity  
   
                                                    Last Documented On   
1 2:04PM ; [facility] provided active listenin  
g, support and helped patient process through   
current   
symptoms and stressors with ongoing mental health concerns and medication 
changes.   
[facility] discussed coping skills and supports that patient is implementing.  
discussed   
implementing coping skills as discussed with counseling and attending weekly   
appointments as scheduled with counselor. Patient was encouraged to continue 
writing   
down concerns with medications and discuss with providers. [facility] reminded patient
of   
crisis resources should they be needed. Patient reports having crisis resources 
and   
could return to ER  
   
                                                    Last Documented On   
1 10:17AM ; [facility]  
   
                                                    Patient education about a pr  
oper diet  
   
                                                    Last Documented On   
1 5:17PM ; [facility]  
   
                                                    Patient education about meal  
 planning  
   
                                                    Last Documented On  5:17PM ; [facility]  
   
                                                    Education about changing eat  
ing habits  
   
                                                    Last Documented On  5:17PM ; [facility]  
   
                                                    Patient education about high  
 fiber diet  
   
                                                    Last Documented On  5:17PM ; [facility]  
   
                                                    Patient education about low   
fat diet  
   
                                                    Last Documented On   
1 5:17PM ; [facility]  
   
                                                    Patient education about low   
cholesterol diet  
   
                                                    Last Documented On   
1 5:17PM ; [facility]  
   
                                                    Patient education about low   
carbohydrate diet  
   
                                                    Last Documented On   
1 5:17PM ; [facility]  
   
                                                    Patient education about high  
 protein diet  
   
                                                    Last Documented On  5:17PM ; [facility] offered active and suppo  
rtive listening, normalized emotions and feelings,   
and   
processed current stressors. [facility] discussed resources for finding a counselor 
and   
provided list of local resources. [facility] discussed patients coping skills and 
supports   
and encouraged patient to continue to implement. [facility] reminded patient of crisis
  
resources should they be needed  
   
                                                    Last Documented On   
1 7:15PM ; [facility]  
   
                                                    Discussed nutritional needs   
teach healthy choices including fruits and   
vegetables  
   
                                                    Last Documented On   
1 11:38AM ; [facility]  
   
                                                    Patient education about a pr  
oper diet  
   
                                                    Last Documented On   
1 11:38AM ; [facility]  
   
                                                    Patient education about a pr  
oper diet  
   
                                                    Last Documented On   
1 12:19PM ; [facility]  
   
                                                    Patient education about meal  
 planning  
   
                                                    Last Documented On   
1 12:19PM ; [facility]  
   
                                                    Education about changing eat  
ing habits  
   
                                                    Last Documented On   
1 12:19PM ; [facility]  
   
                                                    Patient education about high  
 fiber diet  
   
                                                    Last Documented On   
1 12:19PM ; [facility]  
   
                                                    Patient education about low   
fat diet  
   
                                                    Last Documented On   
1 12:19PM ; [facility]  
   
                                                    Patient education about low   
cholesterol diet  
   
                                                    Last Documented On   
1 12:19PM ; [facility]  
   
                                                    Patient education about low   
carbohydrate diet  
   
                                                    Last Documented On   
1 12:19PM ; [facility]  
   
                                                    Patient education about high  
 protein diet  
   
                                                    Last Documented On   
1 12:19PM ; [facility]  
   
                                                    Discussed concerns about exe  
rcise : promote physical activity  
   
                                                    Last Documented On   
1 11:38AM ; [facility]  
[facility], support and helped patient process through   
current   
symptoms and stressors related to family conflict. ~P discussed coping skills 
and   
supports with patient that can be implemented and reminded patient of ways to 
access   
additional resources. ~P discussed crisis resources and plan. Patient has 
crisis   
resources still available should they be needed  
   
                                                    Last Documented On 06/10/202  
1 7:04PM ; [facility]  
   
                                                    Discussed nutritional needs   
teach healthy choices including fruits and   
vegetables  
   
                                                    Last Documented On 06/10/202  
1 3:57PM ; [facility]  
   
                                                    Patient education about a pr  
oper diet  
   
                                                    Last Documented On 06/10/202  
1 3:57PM ; [facility]  
   
                                                    Discussed concerns about exe  
rcise : promote physical activity  
   
                                                    Last Documented On 06/10/202  
1 3:57PM ; [facility]. [facility] and PCP reassured   
patient of not sharing information with anyone unless she has signed a release 
for us   
to do so. ~P provided active listening, support and helped patient process 
through   
current symptoms and stressors. ~P discussed establishing counseling and   
psychiatry. [facility] discussed EMDR therapy and ways to find provider who does this 
type   
of therapy. ~P discussed crisis resources should mood worsen, P provided 
text   
hotline number for crisis. P reviewed crisis plan with patient and patient is 
able   
to contact positive supports and family when feeling down  
   
                                                    Last Documented On   
1 11:46AM ; [facility]  
   
                                                    Discussed nutritional needs   
teach healthy choices including fruits and   
vegetables  
   
                                                    Last Documented On   
1 2:11PM ; [facility]  
   
                                                    Patient education about a pr  
oper diet  
   
                                                    Last Documented On   
1 2:11PM ; [facility]  
   
                                                    Discussed concerns about exe  
rcise : promote physical activity  
   
                                                    Last Documented On   
1 2:11PM ; [facility]  
Work Phone: [phone]  
  
Includes: Instructions for all patient encounters  
  
  
  
                                                    Education and Decision Aids   
were provided during visit for:  
   
                                                    P offered active and suppo  
rtive listening and processed current stressors   
related   
to getting medications. ~P discussed coping skills and supports to implement 
in   
daily routine. ~[facility] discussed progress patient has felt they have made recently 
and   
encouraged continued follow-up with providers to address health  
   
                                                    Last Documented On   
4 5:16PM ; [facility]  
   
                                                    Discussed nutritional needs   
teach healthy choices including fruits and   
vegetables  
   
                                                    Last Documented On   
4 7:14PM ; [facility]  
   
                                                    Patient education about a pr  
oper diet  
   
                                                    Last Documented On   
4 7:14PM ; [facility]  
   
                                                    Discussed concerns about exe  
rcise : promote physical activity  
   
                                                    Last Documented On   
4 7:14PM ; [facility]  
   
                                                    Not requesting contraception  
   
                                                    Last Documented On   
4 7:14PM ; [facility]  
   
                                                    Discussed nutritional needs   
teach healthy choices including fruits and   
vegetables  
   
                                                    Last Documented On   
3 5:15PM ; [facility]  
   
                                                    Patient education about a pr  
oper diet  
   
                                                    Last Documented On   
3 5:15PM ; [facility]  
   
                                                    Discussed concerns about exe  
rcise : promote physical activity  
   
                                                    Last Documented On   
3 5:15PM ; [facility]  
   
                                                    Discussed nutritional needs   
teach healthy choices including fruits and   
vegetables  
   
                                                    Last Documented On 10/21/202  
2 1:42PM ; [facility]  
   
                                                    Patient education about a pr  
oper diet  
   
                                                    Last Documented On 10/21/202  
2 1:42PM ; [facility]  
   
                                                    Discussed concerns about exe  
rcise : promote physical activity  
   
                                                    Last Documented On 10/21/202  
2 1:42PM ; [facility] offered active listening  
 and supportive feedback; normalized emotions and   
feelings, also provided pt time to process any current stressors. ~Promoted and   
encouraged follow-through with scheduling psychiatric services  
   
                                                    Last Documented On   
2 3:21PM ; [facility] provided supportive, empa  
thic listening and reflective feedback. ~Explored,   
encouraged, and supported the pt to discuss current sx/mood, assess risk for   
harm/need, coping mechanisms, support network and safety planning. ~Supported 
pt's   
plan to f/up with Dr. Alonso, as planned at [place], [place]. ~Encouraged 
pt to   
use safety plan, if needed to ensure she remains safe  
   
                                                    Last Documented On   
2 2:27PM ; [facility]  
   
                                                    Discussed nutritional needs   
teach healthy choices including fruits and   
vegetables  
   
                                                    Last Documented On   
2 3:20PM ; [facility]  
   
                                                    Patient education about a pr  
oper diet  
   
                                                    Last Documented On   
2 3:20PM ; [facility]  
   
                                                    Discussed concerns about exe  
rcise : promote physical activity ~ ~Will restart   
trazodone and prazosin ~ ~Patient is planning to have brother stay with her for 
a few   
days for emotional support ~ ~Follow up with PCP at next scheduled visit ~ ~Call
  
psychiatrist office to schedule appt ~ ~Call counselor  
   
                                                    Last Documented On   
2 9:35AM ; [facility]  
   
                                                    Provided supportive listenin  
g and empathic feedback; encouraged, explored, and   
supported the pt as she processed current symptoms, Issues, and concerns. 
~Discussed   
past tx and explored current needs/options. Acknowledged and validated pt's 
thoughts   
and emotions. ~Explored coping mechanisms and support network; utilized 
opportunity   
for safety planning; promoted seeking positive support and seeking help, as 
needed  
   
                                                    Last Documented On   
2 3:28PM ; [facility]  
g, support and helped pt process though current   
symptoms   
and stressor(s). Discussed and explored past effectiveness of medication; 
identified   
objectives and future goals; promoted use of healthy coping mechanisms, and 
self-care   
practices  
   
                                                    Last Documented On   
2 3:19PM ; [facility]  
   
                                                    Reviewed side effects and Ri  
sks/Benefits analysis  
   
                                                    Last Documented On   
2 3:19PM ; [facility]  
   
                                                    Discussed nutritional needs   
teach healthy choices including fruits and   
vegetables  
   
                                                    Last Documented On   
2 3:15PM ; [facility]  
   
                                                    Patient education about a pr  
oper diet  
   
                                                    Last Documented On   
2 3:15PM ; [facility]  
   
                                                    Discussed concerns about exe  
rcise : promote physical activity  
   
                                                    Last Documented On   
2 3:15PM ; [facility] introduced pt to [facility] in  
tegrated model of care ~P offered active listening  
and   
supportive feedback; normalized emotions and feelings, also provided pt time to   
process any current stressors ~[facility] discussed potential benefits of counseling 
and   
supported re-engaging, as needed. ~[facility] encouraged pt to continue to make time to
  
implement self-care regimen and use coping methods, as needed  
   
                                                    Last Documented On   
2 4:07PM ; [facility]  
   
                                                    Discussed nutritional needs   
teach healthy choices including fruits and   
vegetables  
   
                                                    Last Documented On   
2 3:15PM ; [facility]  
   
                                                    Patient education about a pr  
oper diet  
   
                                                    Last Documented On   
2 3:15PM ; [facility]  
   
                                                    Inquiry and counseling about  
 medication administration and compliance  
   
                                                    Last Documented On   
2 7:37PM ; [facility]  
   
                                                    Discussed concerns about exe  
rcise : promote physical activity  
   
                                                    Last Documented On   
2 3:15PM ; [facility]  
   
                                                    Patient goals discussed  
   
                                                    Last Documented On   
2 7:37PM ; [facility]  
   
                                                    Ansewred pt's questions re B  
orderlline Personality D/O and Bipolar D/O raised by  
  
psychiatrist at [place]. ~Validated and normalized patient?s feelings while   
assisting to process recent events  
   
                                                    Last Documented On   
1 1:33AM ; [facility]  
   
                                                    Discussed nutritional needs   
teach healthy choices including fruits and   
vegetables  
   
                                                    Last Documented On   
1 2:04PM ; [facility]  
   
                                                    Patient education about a pr  
oper diet  
   
                                                    Last Documented On   
1 2:04PM ; [facility]  
   
                                                    Discussed concerns about exe  
rcise : promote physical activity  
   
                                                    Last Documented On   
1 2:04PM ; [facility] provided active listenin  
g, support and helped patient process through   
current   
symptoms and stressors with ongoing mental health concerns and medication 
changes.   
~[facility] discussed coping skills and supports that patient is implementing.  
discussed   
implementing coping skills as discussed with counseling and attending weekly   
appointments as scheduled with counselor. Patient was encouraged to continue 
writing   
down concerns with medications and discuss with providers. [facility] reminded patient
of   
crisis resources should they be needed. Patient reports having crisis resources 
and   
could return to ER  
   
                                                    Last Documented On   
1 10:17AM ; [facility]  
   
                                                    Patient education about a pr  
oper diet  
   
                                                    Last Documented On   
1 5:17PM ; [facility]  
   
                                                    Patient education about meal  
 planning  
   
                                                    Last Documented On  5:17PM ; [facility]  
   
                                                    Education about changing eat  
ing habits  
   
                                                    Last Documented On   
1 5:17PM ; [facility]  
   
                                                    Patient education about high  
 fiber diet  
   
                                                    Last Documented On  5:17PM ; [facility]  
   
                                                    Patient education about low   
fat diet  
   
                                                    Last Documented On  5:17PM ; [facility]  
   
                                                    Patient education about low   
cholesterol diet  
   
                                                    Last Documented On  5:17PM ; [facility]  
   
                                                    Patient education about low   
carbohydrate diet  
   
                                                    Last Documented On  5:17PM ; [facility]  
   
                                                    Patient education about high  
 protein diet  
   
                                                    Last Documented On  5:17PM ; [facility] offered active and suppo  
rtive listening, normalized emotions and feelings,   
and   
processed current stressors. [facility] discussed resources for finding a counselor 
and   
provided list of local resources. [facility] discussed patients coping skills and 
supports   
and encouraged patient to continue to implement. [facility] reminded patient of crisis
  
resources should they be needed  
   
                                                    Last Documented On   
1 7:15PM ; [facility]  
   
                                                    Discussed nutritional needs   
teach healthy choices including fruits and   
vegetables  
   
                                                    Last Documented On   
1 11:38AM ; [facility]  
   
                                                    Patient education about a pr  
oper diet  
   
                                                    Last Documented On   
1 11:38AM ; [facility]  
   
                                                    Patient education about a pr  
oper diet  
   
                                                    Last Documented On   
1 12:19PM ; [facility]  
   
                                                    Patient education about meal  
 planning  
   
                                                    Last Documented On   
1 12:19PM ; [facility]  
   
                                                    Education about changing eat  
ing habits  
   
                                                    Last Documented On   
1 12:19PM ; [facility]  
   
                                                    Patient education about high  
 fiber diet  
   
                                                    Last Documented On   
1 12:19PM ; [facility]  
   
                                                    Patient education about low   
fat diet  
   
                                                    Last Documented On   
1 12:19PM ; [facility]  
   
                                                    Patient education about low   
cholesterol diet  
   
                                                    Last Documented On   
1 12:19PM ; [facility]  
   
                                                    Patient education about low   
carbohydrate diet  
   
                                                    Last Documented On   
1 12:19PM ; [facility]  
   
                                                    Patient education about high  
 protein diet  
   
                                                    Last Documented On   
1 12:19PM ; [facility]  
   
                                                    Discussed concerns about exe  
rcise : promote physical activity  
   
                                                    Last Documented On   
1 11:38AM ; [facility] provided active listenin  
g, support and helped patient process through   
current   
symptoms and stressors related to family conflict. ~[facility] discussed coping skills 
and   
supports with patient that can be implemented and reminded patient of ways to 
access   
additional resources. ~[facility] discussed crisis resources and plan. Patient has 
crisis   
resources still available should they be needed  
   
                                                    Last Documented On 06/10/202  
1 7:04PM ; [facility]  
   
                                                    Discussed nutritional needs   
teach healthy choices including fruits and   
vegetables  
   
                                                    Last Documented On 06/10/202  
1 3:57PM ; [facility]  
   
                                                    Patient education about a pr  
oper diet  
   
                                                    Last Documented On 06/10/202  
1 3:57PM ; [facility]  
   
                                                    Discussed concerns about exe  
rcise : promote physical activity  
   
                                                    Last Documented On 06/10/202  
1 3:57PM ; [facility] and PCP reassured   
patient of not sharing information with anyone unless she has signed a release 
for us   
to do so. ~[facility] provided active listening, support and helped patient process 
through   
current symptoms and stressors. ~[facility] discussed establishing counseling and   
psychiatry. [facility] discussed EMDR therapy and ways to find provider who does this 
type   
of therapy. ~[facility] discussed crisis resources should mood worsen, [facility] provided 
text   
hotline number for crisis. [facility] reviewed crisis plan with patient and patient is 
able   
to contact positive supports and family when feeling down  
   
                                                    Last Documented On   
1 11:46AM ; [facility]  
   
                                                    Discussed nutritional needs   
teach healthy choices including fruits and   
vegetables  
   
                                                    Last Documented On   
1 2:11PM ; [facility]  
   
                                                    Patient education about a pr  
oper diet  
   
                                                    Last Documented On   
1 2:11PM ; [facility]  
   
                                                    Discussed concerns about exe  
rcise : promote physical activity  
   
                                                    Last Documented On   
1 2:11PM ; [facility]  
Work Phone: [phone]  
  
Includes: Instructions for all patient encounters  
  
  
  
                                                    Education and Decision Aids   
were provided during visit for:  
   
                                                    [facility] offered active and suppo  
rtive listening and processed current stressors   
related   
to getting medications. ~[facility] discussed coping skills and supports to implement 
in   
daily routine. ~P discussed progress patient has felt they have made recently 
and   
encouraged continued follow-up with providers to address health  
   
                                                    Last Documented On   
4 5:16PM ; [facility]  
   
                                                    Discussed nutritional needs   
teach healthy choices including fruits and   
vegetables  
   
                                                    Last Documented On   
4 7:14PM ; [facility]  
   
                                                    Patient education about a pr  
oper diet  
   
                                                    Last Documented On   
4 7:14PM ; [facility]  
   
                                                    Discussed concerns about exe  
rcise : promote physical activity  
   
                                                    Last Documented On   
4 7:14PM ; [facility]  
   
                                                    Not requesting contraception  
   
                                                    Last Documented On   
4 7:14PM ; [facility]  
   
                                                    Discussed nutritional needs   
teach healthy choices including fruits and   
vegetables  
   
                                                    Last Documented On   
3 5:15PM ; [facility]  
   
                                                    Patient education about a pr  
oper diet  
   
                                                    Last Documented On   
3 5:15PM ; [facility]  
   
                                                    Discussed concerns about exe  
rcise : promote physical activity  
   
                                                    Last Documented On   
3 5:15PM ; [facility]  
   
                                                    Discussed nutritional needs   
teach healthy choices including fruits and   
vegetables  
   
                                                    Last Documented On 10/21/202  
2 1:42PM ; [facility]  
   
                                                    Patient education about a pr  
oper diet  
   
                                                    Last Documented On 10/21/202  
2 1:42PM ; [facility]  
   
                                                    Discussed concerns about exe  
rcise : promote physical activity  
   
                                                    Last Documented On 10/21/202  
2 1:42PM ; [facility]  
 and supportive feedback; normalized emotions and   
feelings, also provided pt time to process any current stressors. ~Promoted and   
encouraged follow-through with scheduling psychiatric services  
   
                                                    Last Documented On   
2 3:21PM ; [facility] provided supportive, empa  
thic listening and reflective feedback. ~Explored,   
encouraged, and supported the pt to discuss current sx/mood, assess risk for   
harm/need, coping mechanisms, support network and safety planning. ~Supported 
pt's   
plan to f/up with Dr. Alonso, as planned at [place], [place]. ~Encouraged 
pt to   
use safety plan, if needed to ensure she remains safe  
   
                                                    Last Documented On   
2 2:27PM ; [facility]  
   
                                                    Discussed nutritional needs   
teach healthy choices including fruits and   
vegetables  
   
                                                    Last Documented On   
2 3:20PM ; [facility]  
   
                                                    Patient education about a pr  
oper diet  
   
                                                    Last Documented On   
2 3:20PM ; [facility]  
   
                                                    Discussed concerns about exe  
rcise : promote physical activity ~ ~Will restart   
trazodone and prazosin ~ ~Patient is planning to have brother stay with her for 
a few   
days for emotional support ~ ~Follow up with PCP at next scheduled visit ~ ~Call
  
psychiatrist office to schedule appt ~ ~Call counselor  
   
                                                    Last Documented On   
2 9:35AM ; [facility]  
   
                                                    Provided supportive listenin  
g and empathic feedback; encouraged, explored, and   
supported the pt as she processed current symptoms, Issues, and concerns. 
~Discussed   
past tx and explored current needs/options. Acknowledged and validated pt's 
thoughts   
and emotions. ~Explored coping mechanisms and support network; utilized 
opportunity   
for safety planning; promoted seeking positive support and seeking help, as 
needed  
   
                                                    Last Documented On   
2 3:28PM ; [facility] provided active listenin  
g, support and helped pt process though current   
symptoms   
and stressor(s). Discussed and explored past effectiveness of medication; 
identified   
objectives and future goals; promoted use of healthy coping mechanisms, and 
self-care   
practices  
   
                                                    Last Documented On   
2 3:19PM ; [facility]  
   
                                                    Reviewed side effects and Ri  
sks/Benefits analysis  
   
                                                    Last Documented On   
2 3:19PM ; [facility]  
   
                                                    Discussed nutritional needs   
teach healthy choices including fruits and   
vegetables  
   
                                                    Last Documented On   
2 3:15PM ; [facility]  
   
                                                    Patient education about a pr  
oper diet  
   
                                                    Last Documented On   
2 3:15PM ; [facility]  
   
                                                    Discussed concerns about exe  
rcise : promote physical activity  
   
                                                    Last Documented On   
2 3:15PM ; [facility] introduced pt to [facility] in  
tegrated model of care ~P offered active listening  
and   
supportive feedback; normalized emotions and feelings, also provided pt time to   
process any current stressors ~[facility] discussed potential benefits of counseling 
and   
supported re-engaging, as needed. ~P encouraged pt to continue to make time to
  
implement self-care regimen and use coping methods, as needed  
   
                                                    Last Documented On   
2 4:07PM ; [facility]  
   
                                                    Discussed nutritional needs   
teach healthy choices including fruits and   
vegetables  
   
                                                    Last Documented On   
2 3:15PM ; [facility]  
   
                                                    Patient education about a pr  
oper diet  
   
                                                    Last Documented On   
2 3:15PM ; [facility]  
   
                                                    Inquiry and counseling about  
 medication administration and compliance  
   
                                                    Last Documented On   
2 7:37PM ; [facility]  
   
                                                    Discussed concerns about exe  
rcise : promote physical activity  
   
                                                    Last Documented On   
2 3:15PM ; [facility]  
   
                                                    Patient goals discussed  
   
                                                    Last Documented On   
2 7:37PM ; [facility]  
   
                                                    Ansewred pt's questions re B  
orderlline Personality D/O and Bipolar D/O raised by  
  
psychiatrist at [place]. ~Validated and normalized patient?s feelings while   
assisting to process recent events  
   
                                                    Last Documented On   
1 1:33AM ; [facility]  
   
                                                    Discussed nutritional needs   
teach healthy choices including fruits and   
vegetables  
   
                                                    Last Documented On   
1 2:04PM ; [facility]  
   
                                                    Patient education about a pr  
oper diet  
   
                                                    Last Documented On   
1 2:04PM ; [facility]  
   
                                                    Discussed concerns about exe  
rcise : promote physical activity  
   
                                                    Last Documented On   
1 2:04PM ; [facility] provided active listenin  
g, support and helped patient process through   
current   
symptoms and stressors with ongoing mental health concerns and medication 
changes.   
~[facility] discussed coping skills and supports that patient is implementing.  
discussed   
implementing coping skills as discussed with counseling and attending weekly   
appointments as scheduled with counselor. Patient was encouraged to continue 
writing   
down concerns with medications and discuss with providers. ~[facility] reminded patient
of   
crisis resources should they be needed. Patient reports having crisis resources 
and   
could return to ER  
   
                                                    Last Documented On   
1 10:17AM ; [facility]  
   
                                                    Patient education about a pr  
oper diet  
   
                                                    Last Documented On   
1 5:17PM ; [facility]  
   
                                                    Patient education about meal  
 planning  
   
                                                    Last Documented On  5:17PM ; [facility]  
   
                                                    Education about changing eat  
ing habits  
   
                                                    Last Documented On   
1 5:17PM ; [facility]  
   
                                                    Patient education about high  
 fiber diet  
   
                                                    Last Documented On  5:17PM ; [facility]  
   
                                                    Patient education about low   
fat diet  
   
                                                    Last Documented On  5:17PM ; [facility]  
   
                                                    Patient education about low   
cholesterol diet  
   
                                                    Last Documented On  5:17PM ; [facility]  
   
                                                    Patient education about low   
carbohydrate diet  
   
                                                    Last Documented On  5:17PM ; [facility]  
   
                                                    Patient education about high  
 protein diet  
   
                                                    Last Documented On  5:17PM ; [facility] offered active and suppo  
rtive listening, normalized emotions and feelings,   
and   
processed current stressors. ~[facility] discussed resources for finding a counselor 
and   
provided list of local resources. ~[facility] discussed patients coping skills and 
supports   
and encouraged patient to continue to implement. [facility] reminded patient of crisis
  
resources should they be needed  
   
                                                    Last Documented On  7:15PM ; [facility]  
   
                                                    Discussed nutritional needs   
teach healthy choices including fruits and   
vegetables  
   
                                                    Last Documented On  11:38AM ; [facility]  
   
                                                    Patient education about a pr  
oper diet  
   
                                                    Last Documented On  11:38AM ; [facility]  
   
                                                    Patient education about a pr  
oper diet  
   
                                                    Last Documented On  12:19PM ; [facility]  
   
                                                    Patient education about meal  
 planning  
   
                                                    Last Documented On  12:19PM ; [facility]  
   
                                                    Education about changing eat  
ing habits  
   
                                                    Last Documented On  12:19PM ; [facility]  
   
                                                    Patient education about high  
 fiber diet  
   
                                                    Last Documented On  12:19PM ; [facility]  
   
                                                    Patient education about low   
fat diet  
   
                                                    Last Documented On  12:19PM ; [facility]  
   
                                                    Patient education about low   
cholesterol diet  
   
                                                    Last Documented On  12:19PM ; [facility]  
   
                                                    Patient education about low   
carbohydrate diet  
   
                                                    Last Documented On   
1 12:19PM ; [facility]  
   
                                                    Patient education about high  
 protein diet  
   
                                                    Last Documented On   
1 12:19PM ; [facility]  
   
                                                    Discussed concerns about exe  
rcise : promote physical activity  
   
                                                    Last Documented On   
1 11:38AM ; [facility] provided active listenin  
g, support and helped patient process through   
current   
symptoms and stressors related to family conflict. ~[facility] discussed coping skills 
and   
supports with patient that can be implemented and reminded patient of ways to 
access   
additional resources. ~[facility] discussed crisis resources and plan. Patient has 
crisis   
resources still available should they be needed  
   
                                                    Last Documented On 06/10/202  
1 7:04PM ; [facility]  
   
                                                    Discussed nutritional needs   
teach healthy choices including fruits and   
vegetables  
   
                                                    Last Documented On 06/10/202  
1 3:57PM ; [facility]  
   
                                                    Patient education about a pr  
oper diet  
   
                                                    Last Documented On 06/10/202  
1 3:57PM ; [facility]  
   
                                                    Discussed concerns about exe  
rcise : promote physical activity  
   
                                                    Last Documented On 06/10/202  
1 3:57PM ; [facility]. P and PCP reassured   
patient of not sharing information with anyone unless she has signed a release 
for us   
to do so. ~[facility] provided active listening, support and helped patient process 
through   
current symptoms and stressors. ~[facility] discussed establishing counseling and   
psychiatry. [facility] discussed EMDR therapy and ways to find provider who does this 
type   
of therapy. ~[facility] discussed crisis resources should mood worsen, [facility] provided 
text   
hotline number for crisis. [facility] reviewed crisis plan with patient and patient is 
able   
to contact positive supports and family when feeling down  
   
                                                    Last Documented On   
1 11:46AM ; [facility]  
   
                                                    Discussed nutritional needs   
teach healthy choices including fruits and   
vegetables  
   
                                                    Last Documented On   
1 2:11PM ; [facility]  
   
                                                    Patient education about a pr  
oper diet  
   
                                                    Last Documented On   
1 2:11PM ; [facility]  
   
                                                    Discussed concerns about exe  
rcise : promote physical activity  
   
                                                    Last Documented On   
1 2:11PM ; [facility]  
Work Phone: [phone]  
  
Includes: Instructions for all patient encounters  
  
  
  
                                                    Education and Decision Aids   
were provided during visit for:  
   
                                                    P offered active and suppo  
rtive listening and processed current stressors.   
~[facility]   
discussed coping skills and supports that can be implemented to manage increased
  
anxiety and stressors. [facility] discussed potential of resuming counseling, even if 
for   
monthly visits to process ongoing stressors. ~[facility] encouraged patient to follow-
up on   
referrals as discussed with PCP  
   
                                                    Last Documented On   
4 10:08AM ; [facility]  
   
                                                    Discussed nutritional needs   
teach healthy choices including fruits and   
vegetables  
   
                                                    Last Documented On   
4 4:46PM ; [facility]  
   
                                                    Patient education about a pr  
oper diet  
   
                                                    Last Documented On   
4 4:46PM ; [facility]  
   
                                                    Discussed concerns about exe  
rcise : promote physical activity  
   
                                                    Last Documented On   
4 4:46PM ; [facility]  
   
                                                    Not requesting contraception  
   
                                                    Last Documented On   
4 4:46PM ; [facility] offered active and suppo  
rtive listening and processed current stressors   
related   
to getting medications. ~[facility] discussed coping skills and supports to implement 
in   
daily routine. ~[facility] discussed progress patient has felt they have made recently 
and   
encouraged continued follow-up with providers to address health  
   
                                                    Last Documented On   
4 5:16PM ; [facility]  
   
                                                    Discussed nutritional needs   
teach healthy choices including fruits and   
vegetables  
   
                                                    Last Documented On   
4 7:14PM ; [facility]  
   
                                                    Patient education about a pr  
oper diet  
   
                                                    Last Documented On   
4 7:14PM ; [facility]  
   
                                                    Discussed concerns about exe  
rcise : promote physical activity  
   
                                                    Last Documented On   
4 7:14PM ; [facility]  
   
                                                    Not requesting contraception  
   
                                                    Last Documented On   
4 7:14PM ; [facility]  
   
                                                    Discussed nutritional needs   
teach healthy choices including fruits and   
vegetables  
   
                                                    Last Documented On   
3 5:15PM ; [facility]  
   
                                                    Patient education about a pr  
oper diet  
   
                                                    Last Documented On   
3 5:15PM ; [facility]  
   
                                                    Discussed concerns about exe  
rcise : promote physical activity  
   
                                                    Last Documented On   
3 5:15PM ; [facility]  
   
                                                    Discussed nutritional needs   
teach healthy choices including fruits and   
vegetables  
   
                                                    Last Documented On 10/21/202  
2 1:42PM ; [facility]  
   
                                                    Patient education about a pr  
oper diet  
   
                                                    Last Documented On 10/21/202  
2 1:42PM ; [facility]  
   
                                                    Discussed concerns about exe  
rcise : promote physical activity  
   
                                                    Last Documented On 10/21/202  
2 1:42PM ; [facility] offered active listening  
 and supportive feedback; normalized emotions and   
feelings, also provided pt time to process any current stressors. ~Promoted and   
encouraged follow-through with scheduling psychiatric services  
   
                                                    Last Documented On   
2 3:21PM ; [facility] provided supportive, empa  
thic listening and reflective feedback. ~Explored,   
encouraged, and supported the pt to discuss current sx/mood, assess risk for   
harm/need, coping mechanisms, support network and safety planning. ~Supported 
pt's   
plan to f/up with Dr. Alonso, as planned at [place], [place]. ~Encouraged 
pt to   
use safety plan, if needed to ensure she remains safe  
   
                                                    Last Documented On   
2 2:27PM ; [facility]  
   
                                                    Discussed nutritional needs   
teach healthy choices including fruits and   
vegetables  
   
                                                    Last Documented On   
2 3:20PM ; [facility]  
   
                                                    Patient education about a pr  
oper diet  
   
                                                    Last Documented On   
2 3:20PM ; [facility]  
   
                                                    Discussed concerns about exe  
rcise : promote physical activity ~ ~Will restart   
trazodone and prazosin ~ ~Patient is planning to have brother stay with her for 
a few   
days for emotional support ~ ~Follow up with PCP at next scheduled visit ~ ~Call
  
psychiatrist office to schedule appt ~ ~Call counselor  
   
                                                    Last Documented On   
2 9:35AM ; [facility]  
   
                                                    Provided supportive listenin  
g and empathic feedback; encouraged, explored, and   
supported the pt as she processed current symptoms, Issues, and concerns. 
~Discussed   
past tx and explored current needs/options. Acknowledged and validated pt's 
thoughts   
and emotions. ~Explored coping mechanisms and support network; utilized 
opportunity   
for safety planning; promoted seeking positive support and seeking help, as 
needed  
   
                                                    Last Documented On   
2 3:28PM ; [facility] provided active listenin  
g, support and helped pt process though current   
symptoms   
and stressor(s). Discussed and explored past effectiveness of medication; 
identified   
objectives and future goals; promoted use of healthy coping mechanisms, and 
self-care   
practices  
   
                                                    Last Documented On   
2 3:19PM ; [facility]  
   
                                                    Reviewed side effects and Ri  
sks/Benefits analysis  
   
                                                    Last Documented On   
2 3:19PM ; [facility]  
   
                                                    Discussed nutritional needs   
teach healthy choices including fruits and   
vegetables  
   
                                                    Last Documented On   
2 3:15PM ; [facility]  
   
                                                    Patient education about a pr  
oper diet  
   
                                                    Last Documented On   
2 3:15PM ; [facility]  
   
                                                    Discussed concerns about exe  
rcise : promote physical activity  
   
                                                    Last Documented On   
2 3:15PM ; [facility] introduced pt to [facility] in  
tegrated model of care ~P offered active listening  
and   
supportive feedback; normalized emotions and feelings, also provided pt time to   
process any current stressors ~P discussed potential benefits of counseling 
and   
supported re-engaging, as needed. ~P encouraged pt to continue to make time to
  
implement self-care regimen and use coping methods, as needed  
   
                                                    Last Documented On   
2 4:07PM ; [facility]  
   
                                                    Discussed nutritional needs   
teach healthy choices including fruits and   
vegetables  
   
                                                    Last Documented On   
2 3:15PM ; [facility]  
   
                                                    Patient education about a pr  
oper diet  
   
                                                    Last Documented On   
2 3:15PM ; [facility]  
   
                                                    Inquiry and counseling about  
 medication administration and compliance  
   
                                                    Last Documented On   
2 7:37PM ; [facility]  
   
                                                    Discussed concerns about exe  
rcise : promote physical activity  
   
                                                    Last Documented On   
2 3:15PM ; [facility]  
   
                                                    Patient goals discussed  
   
                                                    Last Documented On   
2 7:37PM ; [facility]  
   
                                                    Ansewred pt's questions re B  
orderlline Personality D/O and Bipolar D/O raised by  
  
psychiatrist at [place]. ~Validated and normalized patient?s feelings while   
assisting to process recent events  
   
                                                    Last Documented On   
1 1:33AM ; [facility]  
   
                                                    Discussed nutritional needs   
teach healthy choices including fruits and   
vegetables  
   
                                                    Last Documented On   
1 2:04PM ; [facility]  
   
                                                    Patient education about a pr  
oper diet  
   
                                                    Last Documented On   
1 2:04PM ; [facility]  
   
                                                    Discussed concerns about exe  
rcise : promote physical activity  
   
                                                    Last Documented On   
1 2:04PM ; [facility] provided active listenin  
g, support and helped patient process through   
current   
symptoms and stressors with ongoing mental health concerns and medication 
changes.   
[facility] discussed coping skills and supports that patient is implementing.  
discussed   
implementing coping skills as discussed with counseling and attending weekly   
appointments as scheduled with counselor. Patient was encouraged to continue 
writing   
down concerns with medications and discuss with providers. [facility] reminded patient
of   
crisis resources should they be needed. Patient reports having crisis resources 
and   
could return to ER  
   
                                                    Last Documented On   
1 10:17AM ; [facility]  
   
                                                    Patient education about a pr  
oper diet  
   
                                                    Last Documented On  5:17PM ; [facility]  
   
                                                    Patient education about meal  
 planning  
   
                                                    Last Documented On  5:17PM ; [facility]  
   
                                                    Education about changing eat  
ing habits  
   
                                                    Last Documented On  5:17PM ; [facility]  
   
                                                    Patient education about high  
 fiber diet  
   
                                                    Last Documented On  5:17PM ; [facility]  
   
                                                    Patient education about low   
fat diet  
   
                                                    Last Documented On   
1 5:17PM ; [facility]  
   
                                                    Patient education about low   
cholesterol diet  
   
                                                    Last Documented On   
1 5:17PM ; [facility]  
   
                                                    Patient education about low   
carbohydrate diet  
   
                                                    Last Documented On   
1 5:17PM ; [facility]  
   
                                                    Patient education about high  
 protein diet  
   
                                                    Last Documented On  5:17PM ; [facility] offered active and suppo  
rtive listening, normalized emotions and feelings,   
and   
processed current stressors. [facility] discussed resources for finding a counselor 
and   
provided list of local resources. [facility] discussed patients coping skills and 
supports   
and encouraged patient to continue to implement. [facility] reminded patient of crisis
  
resources should they be needed  
   
                                                    Last Documented On   
1 7:15PM ; [facility]  
   
                                                    Discussed nutritional needs   
teach healthy choices including fruits and   
vegetables  
   
                                                    Last Documented On   
1 11:38AM ; [facility]  
   
                                                    Patient education about a pr  
oper diet  
   
                                                    Last Documented On   
1 11:38AM ; [facility]  
   
                                                    Patient education about a pr  
oper diet  
   
                                                    Last Documented On   
1 12:19PM ; [facility]  
   
                                                    Patient education about meal  
 planning  
   
                                                    Last Documented On   
1 12:19PM ; [facility]  
   
                                                    Education about changing eat  
ing habits  
   
                                                    Last Documented On   
1 12:19PM ; [facility]  
   
                                                    Patient education about high  
 fiber diet  
   
                                                    Last Documented On   
1 12:19PM ; [facility]  
   
                                                    Patient education about low   
fat diet  
   
                                                    Last Documented On   
1 12:19PM ; [facility]  
   
                                                    Patient education about low   
cholesterol diet  
   
                                                    Last Documented On   
1 12:19PM ; [facility]  
   
                                                    Patient education about low   
carbohydrate diet  
   
                                                    Last Documented On   
1 12:19PM ; [facility]  
   
                                                    Patient education about high  
 protein diet  
   
                                                    Last Documented On   
1 12:19PM ; [facility]  
   
                                                    Discussed concerns about exe  
rcise : promote physical activity  
   
                                                    Last Documented On   
1 11:38AM ; [facility]  
[facility], support and helped patient process through   
current   
symptoms and stressors related to family conflict. ~P discussed coping skills 
and   
supports with patient that can be implemented and reminded patient of ways to 
access   
additional resources. ~P discussed crisis resources and plan. Patient has 
crisis   
resources still available should they be needed  
   
                                                    Last Documented On 06/10/202  
1 7:04PM ; [facility]  
   
                                                    Discussed nutritional needs   
teach healthy choices including fruits and   
vegetables  
   
                                                    Last Documented On 06/10/202  
1 3:57PM ; [facility]  
   
                                                    Patient education about a pr  
oper diet  
   
                                                    Last Documented On 06/10/202  
1 3:57PM ; [facility]  
   
                                                    Discussed concerns about exe  
rcise : promote physical activity  
   
                                                    Last Documented On 06/10/202  
1 3:57PM ; [facility]. [facility] and PCP reassured   
patient of not sharing information with anyone unless she has signed a release 
for us   
to do so. ~P provided active listening, support and helped patient process 
through   
current symptoms and stressors. ~P discussed establishing counseling and   
psychiatry. [facility] discussed EMDR therapy and ways to find provider who does this 
type   
of therapy. ~P discussed crisis resources should mood worsen, P provided 
text   
hotline number for crisis. P reviewed crisis plan with patient and patient is 
able   
to contact positive supports and family when feeling down  
   
                                                    Last Documented On   
1 11:46AM ; [facility]  
   
                                                    Discussed nutritional needs   
teach healthy choices including fruits and   
vegetables  
   
                                                    Last Documented On   
1 2:11PM ; [facility]  
   
                                                    Patient education about a pr  
oper diet  
   
                                                    Last Documented On   
1 2:11PM ; [facility]  
   
                                                    Discussed concerns about exe  
rcise : promote physical activity  
   
                                                    Last Documented On   
1 2:11PM ; [facility]  
Work Phone: [phone]  
  
Includes: Instructions for all patient encounters  
  
  
  
                                                    Education and Decision Aids   
were provided during visit for:  
   
                                                    Counseling/education [Use fo  
r free text]  
   
                                                    Last Documented On   
4 6:27PM ; [facility]  
   
                                                    Discussed nutritional needs   
teach healthy choices including fruits and   
vegetables  
   
                                                    Last Documented On   
4 4:51PM ; [facility]  
   
                                                    Patient education about a pr  
oper diet  
   
                                                    Last Documented On   
4 4:51PM ; [facility]  
   
                                                    Discussed concerns about exe  
rcise : promote physical activity  
   
                                                    Last Documented On   
4 4:51PM ; [facility]  
   
                                                    Referred Patient to a [facility]  
es Self-Management Program  
   
                                                    Last Documented On   
4 5:22PM ; [facility]  
   
                                                    [facility] offered active and suppo  
rtive listening and processed current stressors.   
~P   
discussed coping skills and supports that can be implemented to manage increased
  
anxiety and stressors. [facility] discussed potential of resuming counseling, even if 
for   
monthly visits to process ongoing stressors. ~P encouraged patient to follow-
up on   
referrals as discussed with PCP  
   
                                                    Last Documented On   
4 10:08AM ; [facility]  
   
                                                    Discussed nutritional needs   
teach healthy choices including fruits and   
vegetables  
   
                                                    Last Documented On   
4 4:46PM ; [facility]  
   
                                                    Patient education about a pr  
oper diet  
   
                                                    Last Documented On   
4 4:46PM ; [facility]  
   
                                                    Discussed concerns about exe  
rcise : promote physical activity  
   
                                                    Last Documented On   
4 4:46PM ; [facility]  
   
                                                    Not requesting contraception  
   
                                                    Last Documented On   
4 4:46PM ; [facility]  
rtive listening and processed current stressors   
related   
to getting medications. ~P discussed coping skills and supports to implement 
in   
daily routine. ~P discussed progress patient has felt they have made recently 
and   
encouraged continued follow-up with providers to address health  
   
                                                    Last Documented On   
4 5:16PM ; [facility]  
   
                                                    Discussed nutritional needs   
teach healthy choices including fruits and   
vegetables  
   
                                                    Last Documented On   
4 7:14PM ; [facility]  
   
                                                    Patient education about a pr  
oper diet  
   
                                                    Last Documented On   
4 7:14PM ; [facility]  
   
                                                    Discussed concerns about exe  
rcise : promote physical activity  
   
                                                    Last Documented On   
4 7:14PM ; [facility]  
   
                                                    Not requesting contraception  
   
                                                    Last Documented On   
4 7:14PM ; [facility]  
   
                                                    Discussed nutritional needs   
teach healthy choices including fruits and   
vegetables  
   
                                                    Last Documented On   
3 5:15PM ; [facility]  
   
                                                    Patient education about a pr  
oper diet  
   
                                                    Last Documented On   
3 5:15PM ; [facility]  
   
                                                    Discussed concerns about exe  
rcise : promote physical activity  
   
                                                    Last Documented On   
3 5:15PM ; [facility]  
   
                                                    Discussed nutritional needs   
teach healthy choices including fruits and   
vegetables  
   
                                                    Last Documented On 10/21/202  
2 1:42PM ; [facility]  
   
                                                    Patient education about a pr  
oper diet  
   
                                                    Last Documented On 10/21/202  
2 1:42PM ; [facility]  
   
                                                    Discussed concerns about exe  
rcise : promote physical activity  
   
                                                    Last Documented On 10/21/202  
2 1:42PM ; [facility]  
 and supportive feedback; normalized emotions and   
feelings, also provided pt time to process any current stressors. ~Promoted and   
encouraged follow-through with scheduling psychiatric services  
   
                                                    Last Documented On   
2 3:21PM ; [facility] provided supportive, empa  
thic listening and reflective feedback. ~Explored,   
encouraged, and supported the pt to discuss current sx/mood, assess risk for   
harm/need, coping mechanisms, support network and safety planning. ~Supported 
pt's   
plan to f/up with Dr. Alonso, as planned at [place], [place]. ~Encouraged 
pt to   
use safety plan, if needed to ensure she remains safe  
   
                                                    Last Documented On   
2 2:27PM ; [facility]  
   
                                                    Discussed nutritional needs   
teach healthy choices including fruits and   
vegetables  
   
                                                    Last Documented On   
2 3:20PM ; [facility]  
   
                                                    Patient education about a pr  
oper diet  
   
                                                    Last Documented On   
2 3:20PM ; [facility]  
   
                                                    Discussed concerns about exe  
rcise : promote physical activity ~ ~Will restart   
trazodone and prazosin ~ ~Patient is planning to have brother stay with her for 
a few   
days for emotional support ~ ~Follow up with PCP at next scheduled visit ~ ~Call
  
psychiatrist office to schedule appt ~ ~Call counselor  
   
                                                    Last Documented On   
2 9:35AM ; [facility]  
   
                                                    Provided supportive listenin  
g and empathic feedback; encouraged, explored, and   
supported the pt as she processed current symptoms, Issues, and concerns. 
~Discussed   
past tx and explored current needs/options. Acknowledged and validated pt's 
thoughts   
and emotions. ~Explored coping mechanisms and support network; utilized 
opportunity   
for safety planning; promoted seeking positive support and seeking help, as 
needed  
   
                                                    Last Documented On   
2 3:28PM ; [facility] provided active listenin  
g, support and helped pt process though current   
symptoms   
and stressor(s). Discussed and explored past effectiveness of medication; 
identified   
objectives and future goals; promoted use of healthy coping mechanisms, and 
self-care   
practices  
   
                                                    Last Documented On   
2 3:19PM ; [facility]  
   
                                                    Reviewed side effects and Ri  
sks/Benefits analysis  
   
                                                    Last Documented On   
2 3:19PM ; [facility]  
   
                                                    Discussed nutritional needs   
teach healthy choices including fruits and   
vegetables  
   
                                                    Last Documented On   
2 3:15PM ; [facility]  
   
                                                    Patient education about a pr  
oper diet  
   
                                                    Last Documented On   
2 3:15PM ; [facility]  
   
                                                    Discussed concerns about exe  
rcise : promote physical activity  
   
                                                    Last Documented On   
2 3:15PM ; [facility] introduced pt to [facility] in  
tegrated model of care ~[facility] offered active listening  
and   
supportive feedback; normalized emotions and feelings, also provided pt time to   
process any current stressors ~[facility] discussed potential benefits of counseling 
and   
supported re-engaging, as needed. ~[facility] encouraged pt to continue to make time to
  
implement self-care regimen and use coping methods, as needed  
   
                                                    Last Documented On   
2 4:07PM ; [facility]  
   
                                                    Discussed nutritional needs   
teach healthy choices including fruits and   
vegetables  
   
                                                    Last Documented On   
2 3:15PM ; [facility]  
   
                                                    Patient education about a pr  
oper diet  
   
                                                    Last Documented On   
2 3:15PM ; [facility]  
   
                                                    Inquiry and counseling about  
 medication administration and compliance  
   
                                                    Last Documented On   
2 7:37PM ; [facility]  
   
                                                    Discussed concerns about exe  
rcise : promote physical activity  
   
                                                    Last Documented On   
2 3:15PM ; [facility]  
   
                                                    Patient goals discussed  
   
                                                    Last Documented On   
2 7:37PM ; [facility]  
   
                                                    Ansewred pt's questions re B  
orderlline Personality D/O and Bipolar D/O raised by  
  
psychiatrist at [place]. ~Validated and normalized patient?s feelings while   
assisting to process recent events  
   
                                                    Last Documented On   
1 1:33AM ; [facility]  
   
                                                    Discussed nutritional needs   
teach healthy choices including fruits and   
vegetables  
   
                                                    Last Documented On   
1 2:04PM ; [facility]  
   
                                                    Patient education about a pr  
oper diet  
   
                                                    Last Documented On   
1 2:04PM ; [facility]  
   
                                                    Discussed concerns about exe  
rcise : promote physical activity  
   
                                                    Last Documented On   
1 2:04PM ; [facility]  
   
                                                    [facility] provided active listenin  
g, support and helped patient process through   
current   
symptoms and stressors with ongoing mental health concerns and medication 
changes.   
~[facility] discussed coping skills and supports that patient is implementing.  
discussed   
implementing coping skills as discussed with counseling and attending weekly   
appointments as scheduled with counselor. Patient was encouraged to continue 
writing   
down concerns with medications and discuss with providers. ~P reminded patient
of   
crisis resources should they be needed. Patient reports having crisis resources 
and   
could return to ER  
   
                                                    Last Documented On   
1 10:17AM ; [facility]  
   
                                                    Patient education about a pr  
oper diet  
   
                                                    Last Documented On   
1 5:17PM ; [facility]  
   
                                                    Patient education about meal  
 planning  
   
                                                    Last Documented On   
1 5:17PM ; [facility]  
   
                                                    Education about changing eat  
ing habits  
   
                                                    Last Documented On   
1 5:17PM ; [facility]  
   
                                                    Patient education about high  
 fiber diet  
   
                                                    Last Documented On   
1 5:17PM ; [facility]  
   
                                                    Patient education about low   
fat diet  
   
                                                    Last Documented On   
1 5:17PM ; [facility]  
   
                                                    Patient education about low   
cholesterol diet  
   
                                                    Last Documented On   
1 5:17PM ; [facility]  
   
                                                    Patient education about low   
carbohydrate diet  
   
                                                    Last Documented On   
1 5:17PM ; [facility]  
   
                                                    Patient education about high  
 protein diet  
   
                                                    Last Documented On   
1 5:17PM ; [facility] offered active and suppo  
rtive listening, normalized emotions and feelings,   
and   
processed current stressors. ~[facility] discussed resources for finding a counselor 
and   
provided list of local resources. ~[facility] discussed patients coping skills and 
supports   
and encouraged patient to continue to implement. [facility] reminded patient of crisis
  
resources should they be needed  
   
                                                    Last Documented On  7:15PM ; [facility]  
   
                                                    Discussed nutritional needs   
teach healthy choices including fruits and   
vegetables  
   
                                                    Last Documented On  11:38AM ; [facility]  
   
                                                    Patient education about a pr  
oper diet  
   
                                                    Last Documented On   
1 11:38AM ; [facility]  
   
                                                    Patient education about a pr  
oper diet  
   
                                                    Last Documented On   
1 12:19PM ; [facility]  
   
                                                    Patient education about meal  
 planning  
   
                                                    Last Documented On   
1 12:19PM ; [facility]  
   
                                                    Education about changing eat  
ing habits  
   
                                                    Last Documented On   
1 12:19PM ; [facility]  
   
                                                    Patient education about high  
 fiber diet  
   
                                                    Last Documented On  12:19PM ; [facility]  
   
                                                    Patient education about low   
fat diet  
   
                                                    Last Documented On   
1 12:19PM ; [facility]  
   
                                                    Patient education about low   
cholesterol diet  
   
                                                    Last Documented On   
1 12:19PM ; [facility]  
   
                                                    Patient education about low   
carbohydrate diet  
   
                                                    Last Documented On   
1 12:19PM ; [facility]  
   
                                                    Patient education about high  
 protein diet  
   
                                                    Last Documented On   
1 12:19PM ; [facility]  
   
                                                    Discussed concerns about exe  
rcise : promote physical activity  
   
                                                    Last Documented On   
1 11:38AM ; [facility] provided active listenin  
g, support and helped patient process through   
current   
symptoms and stressors related to family conflict. ~[facility] discussed coping skills 
and   
supports with patient that can be implemented and reminded patient of ways to 
access   
additional resources. ~[facility] discussed crisis resources and plan. Patient has 
crisis   
resources still available should they be needed  
   
                                                    Last Documented On 06/10/202  
1 7:04PM ; [facility]  
   
                                                    Discussed nutritional needs   
teach healthy choices including fruits and   
vegetables  
   
                                                    Last Documented On 06/10/202  
1 3:57PM ; [facility]  
   
                                                    Patient education about a pr  
oper diet  
   
                                                    Last Documented On 06/10/202  
1 3:57PM ; [facility]  
   
                                                    Discussed concerns about exe  
rcise : promote physical activity  
   
                                                    Last Documented On 06/10/202  
1 3:57PM ; [facility]. [facility] and PCP reassured   
patient of not sharing information with anyone unless she has signed a release 
for us   
to do so. ~P provided active listening, support and helped patient process 
through   
current symptoms and stressors. ~P discussed establishing counseling and   
psychiatry. [facility] discussed EMDR therapy and ways to find provider who does this 
type   
of therapy. ~[facility] discussed crisis resources should mood worsen, [facility] provided 
text   
hotline number for crisis. [facility] reviewed crisis plan with patient and patient is 
able   
to contact positive supports and family when feeling down  
   
                                                    Last Documented On   
1 11:46AM ; [facility]  
   
                                                    Discussed nutritional needs   
teach healthy choices including fruits and   
vegetables  
   
                                                    Last Documented On   
1 2:11PM ; [facility]  
   
                                                    Patient education about a pr  
oper diet  
   
                                                    Last Documented On   
1 2:11PM ; [facility]  
   
                                                    Discussed concerns about exe  
rcise : promote physical activity  
   
                                                    Last Documented On   
1 2:11PM ; [facility]  
Work Phone: [phone]  
  
Includes: Instructions for all patient encounters  
  
  
  
                                                    Education and Decision Aids   
were provided during visit for:  
   
                                                    P offered active and suppo  
rtive listening and processed recent stressors. ~[facility]  
  
discussed coping skills and supports to implement in managing increased anxiety 
such   
as tools learned previously in therapy. ~P discussed sleep hygiene strategies 
that   
can be implemented. ~[facility] encouraged patient to follow-up with specialists as   
scheduled  
   
                                                    Last Documented On   
4 3:26PM ; [facility]  
   
                                                    Discussed nutritional needs   
teach healthy choices including fruits and   
vegetables  
   
                                                    Last Documented On   
4 4:51PM ; [facility]  
   
                                                    Patient education about a pr  
oper diet  
   
                                                    Last Documented On   
4 4:51PM ; [facility]  
   
                                                    Discussed concerns about exe  
rcise : promote physical activity  
   
                                                    Last Documented On   
4 4:51PM ; [facility]  
   
                                                    Referred Patient to a [facility]  
es Self-Management Program  
   
                                                    Last Documented On   
4 5:22PM ; [facility] offered active and suppo  
rtive listening and processed current stressors.   
~P   
discussed coping skills and supports that can be implemented to manage increased
  
anxiety and stressors. P discussed potential of resuming counseling, even if 
for   
monthly visits to process ongoing stressors. ~[facility] encouraged patient to follow-
up on   
referrals as discussed with PCP  
   
                                                    Last Documented On   
4 10:08AM ; [facility]  
   
                                                    Discussed nutritional needs   
teach healthy choices including fruits and   
vegetables  
   
                                                    Last Documented On   
4 4:46PM ; [facility]  
   
                                                    Patient education about a pr  
oper diet  
   
                                                    Last Documented On   
4 4:46PM ; [facility]  
   
                                                    Discussed concerns about exe  
rcise : promote physical activity  
   
                                                    Last Documented On   
4 4:46PM ; [facility]  
   
                                                    Not requesting contraception  
   
                                                    Last Documented On   
4 4:46PM ; [facility] offered active and suppo  
rtive listening and processed current stressors   
related   
to getting medications. ~[facility] discussed coping skills and supports to implement 
in   
daily routine. ~[facility] discussed progress patient has felt they have made recently 
and   
encouraged continued follow-up with providers to address health  
   
                                                    Last Documented On   
4 5:16PM ; [facility]  
   
                                                    Discussed nutritional needs   
teach healthy choices including fruits and   
vegetables  
   
                                                    Last Documented On   
4 7:14PM ; [facility]  
   
                                                    Patient education about a pr  
oper diet  
   
                                                    Last Documented On   
4 7:14PM ; [facility]  
   
                                                    Discussed concerns about exe  
rcise : promote physical activity  
   
                                                    Last Documented On   
4 7:14PM ; [facility]  
   
                                                    Not requesting contraception  
   
                                                    Last Documented On   
4 7:14PM ; [facility]  
   
                                                    Discussed nutritional needs   
teach healthy choices including fruits and   
vegetables  
   
                                                    Last Documented On   
3 5:15PM ; [facility]  
   
                                                    Patient education about a pr  
oper diet  
   
                                                    Last Documented On   
3 5:15PM ; [facility]  
   
                                                    Discussed concerns about exe  
rcise : promote physical activity  
   
                                                    Last Documented On   
3 5:15PM ; [facility]  
   
                                                    Discussed nutritional needs   
teach healthy choices including fruits and   
vegetables  
   
                                                    Last Documented On 10/21/202  
2 1:42PM ; [facility]  
   
                                                    Patient education about a pr  
oper diet  
   
                                                    Last Documented On 10/21/202  
2 1:42PM ; [facility]  
   
                                                    Discussed concerns about exe  
rcise : promote physical activity  
   
                                                    Last Documented On 10/21/202  
2 1:42PM ; [facility]  
 and supportive feedback; normalized emotions and   
feelings, also provided pt time to process any current stressors. ~Promoted and   
encouraged follow-through with scheduling psychiatric services  
   
                                                    Last Documented On   
2 3:21PM ; [facility] provided supportive, empa  
thic listening and reflective feedback. ~Explored,   
encouraged, and supported the pt to discuss current sx/mood, assess risk for   
harm/need, coping mechanisms, support network and safety planning. ~Supported 
pt's   
plan to f/up with Dr. Alonso, as planned at [place], [place]. ~Encouraged 
pt to   
use safety plan, if needed to ensure she remains safe  
   
                                                    Last Documented On   
2 2:27PM ; [facility]  
   
                                                    Discussed nutritional needs   
teach healthy choices including fruits and   
vegetables  
   
                                                    Last Documented On   
2 3:20PM ; [facility]  
   
                                                    Patient education about a pr  
oper diet  
   
                                                    Last Documented On   
2 3:20PM ; [facility]  
   
                                                    Discussed concerns about exe  
rcise : promote physical activity ~ ~Will restart   
trazodone and prazosin ~ ~Patient is planning to have brother stay with her for 
a few   
days for emotional support ~ ~Follow up with PCP at next scheduled visit ~ ~Call
  
psychiatrist office to schedule appt ~ ~Call counselor  
   
                                                    Last Documented On   
2 9:35AM ; [facility]  
   
                                                    Provided supportive listenin  
g and empathic feedback; encouraged, explored, and   
supported the pt as she processed current symptoms, Issues, and concerns. 
~Discussed   
past tx and explored current needs/options. Acknowledged and validated pt's 
thoughts   
and emotions. ~Explored coping mechanisms and support network; utilized 
opportunity   
for safety planning; promoted seeking positive support and seeking help, as 
needed  
   
                                                    Last Documented On   
2 3:28PM ; [facility] provided active listenin  
g, support and helped pt process though current   
symptoms   
and stressor(s). Discussed and explored past effectiveness of medication; 
identified   
objectives and future goals; promoted use of healthy coping mechanisms, and 
self-care   
practices  
   
                                                    Last Documented On   
2 3:19PM ; [facility]  
   
                                                    Reviewed side effects and Ri  
sks/Benefits analysis  
   
                                                    Last Documented On   
2 3:19PM ; [facility]  
   
                                                    Discussed nutritional needs   
teach healthy choices including fruits and   
vegetables  
   
                                                    Last Documented On   
2 3:15PM ; [facility]  
   
                                                    Patient education about a pr  
oper diet  
   
                                                    Last Documented On   
2 3:15PM ; [facility]  
   
                                                    Discussed concerns about exe  
rcise : promote physical activity  
   
                                                    Last Documented On   
2 3:15PM ; [facility] introduced pt to [facility]WO in  
tegrated model of care ~[facility] offered active listening  
and   
supportive feedback; normalized emotions and feelings, also provided pt time to   
process any current stressors ~[facility] discussed potential benefits of counseling 
and   
supported re-engaging, as needed. ~[facility] encouraged pt to continue to make time to
  
implement self-care regimen and use coping methods, as needed  
   
                                                    Last Documented On   
2 4:07PM ; [facility]  
   
                                                    Discussed nutritional needs   
teach healthy choices including fruits and   
vegetables  
   
                                                    Last Documented On   
2 3:15PM ; [facility]  
   
                                                    Patient education about a pr  
oper diet  
   
                                                    Last Documented On   
2 3:15PM ; [facility]  
   
                                                    Inquiry and counseling about  
 medication administration and compliance  
   
                                                    Last Documented On   
2 7:37PM ; [facility]  
   
                                                    Discussed concerns about exe  
rcise : promote physical activity  
   
                                                    Last Documented On   
2 3:15PM ; [facility]  
   
                                                    Patient goals discussed  
   
                                                    Last Documented On   
2 7:37PM ; [facility]  
   
                                                    Ansewred pt's questions re B  
orderlline Personality D/O and Bipolar D/O raised by  
  
psychiatrist at [place]. ~Validated and normalized patient?s feelings while   
assisting to process recent events  
   
                                                    Last Documented On   
1 1:33AM ; [facility]  
   
                                                    Discussed nutritional needs   
teach healthy choices including fruits and   
vegetables  
   
                                                    Last Documented On   
1 2:04PM ; [facility]  
   
                                                    Patient education about a pr  
oper diet  
   
                                                    Last Documented On   
1 2:04PM ; [facility]  
   
                                                    Discussed concerns about exe  
rcise : promote physical activity  
   
                                                    Last Documented On   
1 2:04PM ; [facility] provided active listenin  
g, support and helped patient process through   
current   
symptoms and stressors with ongoing mental health concerns and medication 
changes.   
~[facility] discussed coping skills and supports that patient is implementing.  
discussed   
implementing coping skills as discussed with counseling and attending weekly   
appointments as scheduled with counselor. Patient was encouraged to continue 
writing   
down concerns with medications and discuss with providers. ~P reminded patient
of   
crisis resources should they be needed. Patient reports having crisis resources 
and   
could return to ER  
   
                                                    Last Documented On   
1 10:17AM ; [facility]  
   
                                                    Patient education about a pr  
oper diet  
   
                                                    Last Documented On   
1 5:17PM ; [facility]  
   
                                                    Patient education about meal  
 planning  
   
                                                    Last Documented On  5:17PM ; [facility]  
   
                                                    Education about changing eat  
ing habits  
   
                                                    Last Documented On  5:17PM ; [facility]  
   
                                                    Patient education about high  
 fiber diet  
   
                                                    Last Documented On   
1 5:17PM ; [facility]  
   
                                                    Patient education about low   
fat diet  
   
                                                    Last Documented On   
1 5:17PM ; [facility]  
   
                                                    Patient education about low   
cholesterol diet  
   
                                                    Last Documented On   
1 5:17PM ; [facility]  
   
                                                    Patient education about low   
carbohydrate diet  
   
                                                    Last Documented On   
1 5:17PM ; [facility]  
   
                                                    Patient education about high  
 protein diet  
   
                                                    Last Documented On   
1 5:17PM ; [facility] offered active and suppo  
rtive listening, normalized emotions and feelings,   
and   
processed current stressors. ~P discussed resources for finding a counselor 
and   
provided list of local resources. ~P discussed patients coping skills and 
supports   
and encouraged patient to continue to implement. ~P reminded patient of crisis
  
resources should they be needed  
   
                                                    Last Documented On   
1 7:15PM ; [facility]  
   
                                                    Discussed nutritional needs   
teach healthy choices including fruits and   
vegetables  
   
                                                    Last Documented On   
1 11:38AM ; [facility]  
   
                                                    Patient education about a pr  
oper diet  
   
                                                    Last Documented On   
1 11:38AM ; [facility]  
   
                                                    Patient education about a pr  
oper diet  
   
                                                    Last Documented On   
1 12:19PM ; [facility]  
   
                                                    Patient education about meal  
 planning  
   
                                                    Last Documented On   
1 12:19PM ; [facility]  
   
                                                    Education about changing eat  
ing habits  
   
                                                    Last Documented On   
1 12:19PM ; [facility]  
   
                                                    Patient education about high  
 fiber diet  
   
                                                    Last Documented On   
1 12:19PM ; [facility]  
   
                                                    Patient education about low   
fat diet  
   
                                                    Last Documented On   
1 12:19PM ; [facility]  
   
                                                    Patient education about low   
cholesterol diet  
   
                                                    Last Documented On   
1 12:19PM ; [facility]  
   
                                                    Patient education about low   
carbohydrate diet  
   
                                                    Last Documented On   
1 12:19PM ; [facility]  
   
                                                    Patient education about high  
 protein diet  
   
                                                    Last Documented On   
1 12:19PM ; [facility]  
   
                                                    Discussed concerns about exe  
rcise : promote physical activity  
   
                                                    Last Documented On   
1 11:38AM ; [facility] provided active listenin  
g, support and helped patient process through   
current   
symptoms and stressors related to family conflict. ~[facility] discussed coping skills 
and   
supports with patient that can be implemented and reminded patient of ways to 
access   
additional resources. ~[facility] discussed crisis resources and plan. Patient has 
crisis   
resources still available should they be needed  
   
                                                    Last Documented On 06/10/202  
1 7:04PM ; [facility]  
   
                                                    Discussed nutritional needs   
teach healthy choices including fruits and   
vegetables  
   
                                                    Last Documented On 06/10/202  
1 3:57PM ; [facility]  
   
                                                    Patient education about a pr  
oper diet  
   
                                                    Last Documented On 06/10/202  
1 3:57PM ; [facility]  
   
                                                    Discussed concerns about exe  
rcise : promote physical activity  
   
                                                    Last Documented On 06/10/202  
1 3:57PM ; [facility] and PCP reassured   
patient of not sharing information with anyone unless she has signed a release 
for us   
to do so. ~[facility] provided active listening, support and helped patient process 
through   
current symptoms and stressors. ~[facility] discussed establishing counseling and   
psychiatry. P discussed EMDR therapy and ways to find provider who does this 
type   
of therapy. ~[facility] discussed crisis resources should mood worsen, [facility] provided 
text   
hotline number for crisis. [facility] reviewed crisis plan with patient and patient is 
able   
to contact positive supports and family when feeling down  
   
                                                    Last Documented On   
1 11:46AM ; [facility]  
   
                                                    Discussed nutritional needs   
teach healthy choices including fruits and   
vegetables  
   
                                                    Last Documented On   
1 2:11PM ; [facility]  
   
                                                    Patient education about a pr  
oper diet  
   
                                                    Last Documented On   
1 2:11PM ; [facility]  
   
                                                    Discussed concerns about exe  
rcise : promote physical activity  
   
                                                    Last Documented On   
1 2:11PM ; [facility]  
Work Phone: [phone]  
  
Includes: Instructions for all patient encounters  
  
  
  
                                                    Education and Decision Aids   
were provided during visit for:  
   
                                                    ~*[facility] offered active and sup  
portive listening, normalized emotions and feelings,  
and   
processed ~current stressors. ~*Discussed engageing in counseling and looking 
into   
EMDR to address PTSD and increased nightmares  
   
                                                    Last Documented On   
4 4:14PM ; [facility]  
   
                                                    Discussed nutritional needs   
teach healthy choices including fruits and   
vegetables  
   
                                                    Last Documented On   
4 4:33PM ; [facility]  
   
                                                    Patient education about a pr  
oper diet  
   
                                                    Last Documented On   
4 4:33PM ; [facility]  
   
                                                    Discussed concerns about exe  
rcise : promote physical activity  
   
                                                    Last Documented On   
4 4:33PM ; [facility] offered active and suppo  
rtive listening and processed recent stressors. ~[facility]  
  
discussed coping skills and supports to implement in managing increased anxiety 
such   
as tools learned previously in therapy. ~[facility] discussed sleep hygiene strategies 
that   
can be implemented. ~[facility] encouraged patient to follow-up with specialists as   
scheduled  
   
                                                    Last Documented On   
4 3:26PM ; [facility]  
   
                                                    Discussed nutritional needs   
teach healthy choices including fruits and   
vegetables  
   
                                                    Last Documented On   
4 4:51PM ; [facility]  
   
                                                    Patient education about a pr  
oper diet  
   
                                                    Last Documented On   
4 4:51PM ; [facility]  
   
                                                    Discussed concerns about exe  
rcise : promote physical activity  
   
                                                    Last Documented On   
4 4:51PM ; [facility]  
   
                                                    Referred Patient to a [facility]  
es Self-Management Program  
   
                                                    Last Documented On   
4 5:22PM ; [facility]  
rtive listening and processed current stressors.   
~P   
discussed coping skills and supports that can be implemented to manage increased
  
anxiety and stressors. P discussed potential of resuming counseling, even if 
for   
monthly visits to process ongoing stressors. ~[facility] encouraged patient to follow-
up on   
referrals as discussed with PCP  
   
                                                    Last Documented On   
4 10:08AM ; [facility]  
   
                                                    Discussed nutritional needs   
teach healthy choices including fruits and   
vegetables  
   
                                                    Last Documented On   
4 4:46PM ; [facility]  
   
                                                    Patient education about a pr  
oper diet  
   
                                                    Last Documented On   
4 4:46PM ; [facility]  
   
                                                    Discussed concerns about exe  
rcise : promote physical activity  
   
                                                    Last Documented On   
4 4:46PM ; [facility]  
   
                                                    Not requesting contraception  
   
                                                    Last Documented On   
4 4:46PM ; [facility] offered active and suppo  
rtive listening and processed current stressors   
related   
to getting medications. ~[facility] discussed coping skills and supports to implement 
in   
daily routine. ~[facility] discussed progress patient has felt they have made recently 
and   
encouraged continued follow-up with providers to address health  
   
                                                    Last Documented On   
4 5:16PM ; [facility]  
   
                                                    Discussed nutritional needs   
teach healthy choices including fruits and   
vegetables  
   
                                                    Last Documented On   
4 7:14PM ; [facility]  
   
                                                    Patient education about a pr  
oper diet  
   
                                                    Last Documented On   
4 7:14PM ; [facility]  
   
                                                    Discussed concerns about exe  
rcise : promote physical activity  
   
                                                    Last Documented On   
4 7:14PM ; [facility]  
   
                                                    Not requesting contraception  
   
                                                    Last Documented On   
4 7:14PM ; [facility]  
   
                                                    Discussed nutritional needs   
teach healthy choices including fruits and   
vegetables  
   
                                                    Last Documented On   
3 5:15PM ; [facility]  
   
                                                    Patient education about a pr  
oper diet  
   
                                                    Last Documented On   
3 5:15PM ; [facility]  
   
                                                    Discussed concerns about exe  
rcise : promote physical activity  
   
                                                    Last Documented On   
3 5:15PM ; [facility]  
   
                                                    Discussed nutritional needs   
teach healthy choices including fruits and   
vegetables  
   
                                                    Last Documented On 10/21/202  
2 1:42PM ; [facility]  
   
                                                    Patient education about a pr  
oper diet  
   
                                                    Last Documented On 10/21/202  
2 1:42PM ; [facility]  
   
                                                    Discussed concerns about exe  
rcise : promote physical activity  
   
                                                    Last Documented On 10/21/202  
2 1:42PM ; [facility] offered active listening  
 and supportive feedback; normalized emotions and   
feelings, also provided pt time to process any current stressors. ~Promoted and   
encouraged follow-through with scheduling psychiatric services  
   
                                                    Last Documented On   
2 3:21PM ; [facility] provided supportive, empa  
thic listening and reflective feedback. ~Explored,   
encouraged, and supported the pt to discuss current sx/mood, assess risk for   
harm/need, coping mechanisms, support network and safety planning. ~Supported 
pt's   
plan to f/up with Dr. Alonso, as planned at [place], [place]. ~Encouraged 
pt to   
use safety plan, if needed to ensure she remains safe  
   
                                                    Last Documented On   
2 2:27PM ; [facility]  
   
                                                    Discussed nutritional needs   
teach healthy choices including fruits and   
vegetables  
   
                                                    Last Documented On   
2 3:20PM ; [facility]  
   
                                                    Patient education about a pr  
oper diet  
   
                                                    Last Documented On   
2 3:20PM ; [facility]  
   
                                                    Discussed concerns about exe  
rcise : promote physical activity ~ ~Will restart   
trazodone and prazosin ~ ~Patient is planning to have brother stay with her for 
a few   
days for emotional support ~ ~Follow up with PCP at next scheduled visit ~ ~Call
  
psychiatrist office to schedule appt ~ ~Call counselor  
   
                                                    Last Documented On   
2 9:35AM ; [facility]  
   
                                                    Provided supportive listenin  
g and empathic feedback; encouraged, explored, and   
supported the pt as she processed current symptoms, Issues, and concerns. 
~Discussed   
past tx and explored current needs/options. Acknowledged and validated pt's 
thoughts   
and emotions. ~Explored coping mechanisms and support network; utilized 
opportunity   
for safety planning; promoted seeking positive support and seeking help, as 
needed  
   
                                                    Last Documented On   
2 3:28PM ; [facility] provided active listenin  
g, support and helped pt process though current   
symptoms   
and stressor(s). Discussed and explored past effectiveness of medication; 
identified   
objectives and future goals; promoted use of healthy coping mechanisms, and 
self-care   
practices  
   
                                                    Last Documented On   
2 3:19PM ; [facility]  
   
                                                    Reviewed side effects and Ri  
sks/Benefits analysis  
   
                                                    Last Documented On   
2 3:19PM ; [facility]  
   
                                                    Discussed nutritional needs   
teach healthy choices including fruits and   
vegetables  
   
                                                    Last Documented On   
2 3:15PM ; [facility]  
   
                                                    Patient education about a pr  
oper diet  
   
                                                    Last Documented On   
2 3:15PM ; [facility]  
   
                                                    Discussed concerns about exe  
rcise : promote physical activity  
   
                                                    Last Documented On   
2 3:15PM ; [facility] introduced pt to [facility] in  
tegrated model of care ~[facility] offered active listening  
and   
supportive feedback; normalized emotions and feelings, also provided pt time to   
process any current stressors ~[facility] discussed potential benefits of counseling 
and   
supported re-engaging, as needed. ~[facility] encouraged pt to continue to make time to
  
implement self-care regimen and use coping methods, as needed  
   
                                                    Last Documented On   
2 4:07PM ; [facility]  
   
                                                    Discussed nutritional needs   
teach healthy choices including fruits and   
vegetables  
   
                                                    Last Documented On   
2 3:15PM ; [facility]  
   
                                                    Patient education about a pr  
oper diet  
   
                                                    Last Documented On   
2 3:15PM ; [facility]  
   
                                                    Inquiry and counseling about  
 medication administration and compliance  
   
                                                    Last Documented On   
2 7:37PM ; [facility]  
   
                                                    Discussed concerns about exe  
rcise : promote physical activity  
   
                                                    Last Documented On   
2 3:15PM ; [facility]  
   
                                                    Patient goals discussed  
   
                                                    Last Documented On   
2 7:37PM ; [facility]  
   
                                                    Ansewred pt's questions re B  
orderlline Personality D/O and Bipolar D/O raised by  
  
psychiatrist at [place]. ~Validated and normalized patient?s feelings while   
assisting to process recent events  
   
                                                    Last Documented On   
1 1:33AM ; [facility]  
   
                                                    Discussed nutritional needs   
teach healthy choices including fruits and   
vegetables  
   
                                                    Last Documented On   
1 2:04PM ; [facility]  
   
                                                    Patient education about a pr  
oper diet  
   
                                                    Last Documented On   
1 2:04PM ; [facility]  
   
                                                    Discussed concerns about exe  
rcise : promote physical activity  
   
                                                    Last Documented On   
1 2:04PM ; [facility] provided active listenin  
g, support and helped patient process through   
current   
symptoms and stressors with ongoing mental health concerns and medication 
changes.   
~[facility] discussed coping skills and supports that patient is implementing.  
discussed   
implementing coping skills as discussed with counseling and attending weekly   
appointments as scheduled with counselor. Patient was encouraged to continue 
writing   
down concerns with medications and discuss with providers. ~[facility] reminded patient
of   
crisis resources should they be needed. Patient reports having crisis resources 
and   
could return to ER  
   
                                                    Last Documented On   
1 10:17AM ; [facility]  
   
                                                    Patient education about a pr  
oper diet  
   
                                                    Last Documented On   
1 5:17PM ; [facility]  
   
                                                    Patient education about meal  
 planning  
   
                                                    Last Documented On   
1 5:17PM ; [facility]  
   
                                                    Education about changing eat  
ing habits  
   
                                                    Last Documented On   
1 5:17PM ; [facility]  
   
                                                    Patient education about high  
 fiber diet  
   
                                                    Last Documented On   
1 5:17PM ; [facility]  
   
                                                    Patient education about low   
fat diet  
   
                                                    Last Documented On   
1 5:17PM ; [facility]  
   
                                                    Patient education about low   
cholesterol diet  
   
                                                    Last Documented On   
1 5:17PM ; [facility]  
   
                                                    Patient education about low   
carbohydrate diet  
   
                                                    Last Documented On   
1 5:17PM ; [facility]  
   
                                                    Patient education about high  
 protein diet  
   
                                                    Last Documented On   
1 5:17PM ; [facility] offered active and suppo  
rtive listening, normalized emotions and feelings,   
and   
processed current stressors. ~[facility] discussed resources for finding a counselor 
and   
provided list of local resources. ~[facility] discussed patients coping skills and 
supports   
and encouraged patient to continue to implement. [facility] reminded patient of crisis
  
resources should they be needed  
   
                                                    Last Documented On   
1 7:15PM ; [facility]  
   
                                                    Discussed nutritional needs   
teach healthy choices including fruits and   
vegetables  
   
                                                    Last Documented On   
1 11:38AM ; [facility]  
   
                                                    Patient education about a pr  
oper diet  
   
                                                    Last Documented On   
1 11:38AM ; [facility]  
   
                                                    Patient education about a pr  
oper diet  
   
                                                    Last Documented On   
1 12:19PM ; [facility]  
   
                                                    Patient education about meal  
 planning  
   
                                                    Last Documented On   
1 12:19PM ; [facility]  
   
                                                    Education about changing eat  
ing habits  
   
                                                    Last Documented On   
1 12:19PM ; [facility]  
   
                                                    Patient education about high  
 fiber diet  
   
                                                    Last Documented On   
1 12:19PM ; [facility]  
   
                                                    Patient education about low   
fat diet  
   
                                                    Last Documented On  12:19PM ; [facility]  
   
                                                    Patient education about low   
cholesterol diet  
   
                                                    Last Documented On   
1 12:19PM ; [facility]  
   
                                                    Patient education about low   
carbohydrate diet  
   
                                                    Last Documented On  12:19PM ; [facility]  
   
                                                    Patient education about high  
 protein diet  
   
                                                    Last Documented On   
1 12:19PM ; [facility]  
   
                                                    Discussed concerns about exe  
rcise : promote physical activity  
   
                                                    Last Documented On   
1 11:38AM ; [facility]  
g, support and helped patient process through   
current   
symptoms and stressors related to family conflict. ~P discussed coping skills 
and   
supports with patient that can be implemented and reminded patient of ways to 
access   
additional resources. ~[facility] discussed crisis resources and plan. Patient has 
crisis   
resources still available should they be needed  
   
                                                    Last Documented On 06/10/202  
1 7:04PM ; [facility]  
   
                                                    Discussed nutritional needs   
teach healthy choices including fruits and   
vegetables  
   
                                                    Last Documented On 06/10/202  
1 3:57PM ; [facility]  
   
                                                    Patient education about a pr  
oper diet  
   
                                                    Last Documented On 06/10/202  
1 3:57PM ; [facility]  
   
                                                    Discussed concerns about exe  
rcise : promote physical activity  
   
                                                    Last Documented On 06/10/202  
1 3:57PM ; [facility]. [facility] and PCP reassured   
patient of not sharing information with anyone unless she has signed a release 
for us   
to do so. ~P provided active listening, support and helped patient process 
through   
current symptoms and stressors. ~[facility] discussed establishing counseling and   
psychiatry. [facility] discussed EMDR therapy and ways to find provider who does this 
type   
of therapy. ~[facility] discussed crisis resources should mood worsen, [facility] provided 
text   
hotline number for crisis. [facility] reviewed crisis plan with patient and patient is 
able   
to contact positive supports and family when feeling down  
   
                                                    Last Documented On   
1 11:46AM ; [facility]  
   
                                                    Discussed nutritional needs   
teach healthy choices including fruits and   
vegetables  
   
                                                    Last Documented On   
1 2:11PM ; [facility]  
   
                                                    Patient education about a pr  
oper diet  
   
                                                    Last Documented On   
1 2:11PM ; [facility]  
   
                                                    Discussed concerns about exe  
rcise : promote physical activity  
   
                                                    Last Documented On   
1 2:11PM ; [facility]  
Work Phone: [phone]  
  
Includes: Instructions for all patient encounters  
  
  
  
                                                    Education and Decision Aids   
were provided during visit for:  
   
                                                    ~*[facility] offered active and sup  
portive listening, normalized emotions and feelings,  
and   
processed ~current stressors. ~*Discussed engageing in counseling and looking 
into   
EMDR to address PTSD and increased nightmares  
   
                                                    Last Documented On   
4 4:14PM ; [facility]  
   
                                                    Discussed nutritional needs   
teach healthy choices including fruits and   
vegetables  
   
                                                    Last Documented On   
4 4:33PM ; [facility]  
   
                                                    Patient education about a pr  
oper diet  
   
                                                    Last Documented On   
4 4:33PM ; [facility]  
   
                                                    Discussed concerns about exe  
rcise : promote physical activity  
   
                                                    Last Documented On   
4 4:33PM ; [facility] offered active and suppo  
rtive listening and processed recent stressors. ~[facility]  
  
discussed coping skills and supports to implement in managing increased anxiety 
such   
as tools learned previously in therapy. ~[facility] discussed sleep hygiene strategies 
that   
can be implemented. ~[facility] encouraged patient to follow-up with specialists as   
scheduled  
   
                                                    Last Documented On   
4 3:26PM ; [facility]  
   
                                                    Discussed nutritional needs   
teach healthy choices including fruits and   
vegetables  
   
                                                    Last Documented On   
4 4:51PM ; [facility]  
   
                                                    Patient education about a pr  
oper diet  
   
                                                    Last Documented On   
4 4:51PM ; [facility]  
   
                                                    Discussed concerns about exe  
rcise : promote physical activity  
   
                                                    Last Documented On   
4 4:51PM ; [facility]  
   
                                                    Referred Patient to a [facility]  
es Self-Management Program  
   
                                                    Last Documented On   
4 5:22PM ; [facility] and suppo  
rtive listening and processed current stressors.   
~P   
discussed coping skills and supports that can be implemented to manage increased
  
anxiety and stressors. P discussed potential of resuming counseling, even if 
for   
monthly visits to process ongoing stressors. ~[facility] encouraged patient to follow-
up on   
referrals as discussed with PCP  
   
                                                    Last Documented On   
4 10:08AM ; [facility]  
   
                                                    Discussed nutritional needs   
teach healthy choices including fruits and   
vegetables  
   
                                                    Last Documented On   
4 4:46PM ; [facility]  
   
                                                    Patient education about a pr  
oper diet  
   
                                                    Last Documented On   
4 4:46PM ; [facility]  
   
                                                    Discussed concerns about exe  
rcise : promote physical activity  
   
                                                    Last Documented On   
4 4:46PM ; [facility]  
   
                                                    Not requesting contraception  
   
                                                    Last Documented On   
4 4:46PM ; [facility]  
rtive listening and processed current stressors   
related   
to getting medications. ~[facility] discussed coping skills and supports to implement 
in   
daily routine. ~[facility] discussed progress patient has felt they have made recently 
and   
encouraged continued follow-up with providers to address health  
   
                                                    Last Documented On   
4 5:16PM ; [facility]  
   
                                                    Discussed nutritional needs   
teach healthy choices including fruits and   
vegetables  
   
                                                    Last Documented On   
4 7:14PM ; [facility]  
   
                                                    Patient education about a pr  
oper diet  
   
                                                    Last Documented On   
4 7:14PM ; [facility]  
   
                                                    Discussed concerns about exe  
rcise : promote physical activity  
   
                                                    Last Documented On   
4 7:14PM ; [facility]  
   
                                                    Not requesting contraception  
   
                                                    Last Documented On   
4 7:14PM ; [facility]  
   
                                                    Discussed nutritional needs   
teach healthy choices including fruits and   
vegetables  
   
                                                    Last Documented On   
3 5:15PM ; [facility]  
   
                                                    Patient education about a pr  
oper diet  
   
                                                    Last Documented On   
3 5:15PM ; [facility]  
   
                                                    Discussed concerns about exe  
rcise : promote physical activity  
   
                                                    Last Documented On   
3 5:15PM ; [facility]  
   
                                                    Discussed nutritional needs   
teach healthy choices including fruits and   
vegetables  
   
                                                    Last Documented On 10/21/202  
2 1:42PM ; [facility]  
   
                                                    Patient education about a pr  
oper diet  
   
                                                    Last Documented On 10/21/202  
2 1:42PM ; [facility]  
   
                                                    Discussed concerns about exe  
rcise : promote physical activity  
   
                                                    Last Documented On 10/21/202  
2 1:42PM ; [facility] offered active listening  
 and supportive feedback; normalized emotions and   
feelings, also provided pt time to process any current stressors. ~Promoted and   
encouraged follow-through with scheduling psychiatric services  
   
                                                    Last Documented On   
2 3:21PM ; [facility] provided supportive, empa  
thic listening and reflective feedback. ~Explored,   
encouraged, and supported the pt to discuss current sx/mood, assess risk for   
harm/need, coping mechanisms, support network and safety planning. ~Supported 
pt's   
plan to f/up with Dr. Alonso, as planned at [place], [place]. ~Encouraged 
pt to   
use safety plan, if needed to ensure she remains safe  
   
                                                    Last Documented On   
2 2:27PM ; [facility]  
   
                                                    Discussed nutritional needs   
teach healthy choices including fruits and   
vegetables  
   
                                                    Last Documented On   
2 3:20PM ; [facility]  
   
                                                    Patient education about a pr  
oper diet  
   
                                                    Last Documented On   
2 3:20PM ; [facility]  
   
                                                    Discussed concerns about exe  
rcise : promote physical activity ~ ~Will restart   
trazodone and prazosin ~ ~Patient is planning to have brother stay with her for 
a few   
days for emotional support ~ ~Follow up with PCP at next scheduled visit ~ ~Call
  
psychiatrist office to schedule appt ~ ~Call counselor  
   
                                                    Last Documented On   
2 9:35AM ; [facility]  
   
                                                    Provided supportive listenin  
g and empathic feedback; encouraged, explored, and   
supported the pt as she processed current symptoms, Issues, and concerns. 
~Discussed   
past tx and explored current needs/options. Acknowledged and validated pt's 
thoughts   
and emotions. ~Explored coping mechanisms and support network; utilized 
opportunity   
for safety planning; promoted seeking positive support and seeking help, as 
needed  
   
                                                    Last Documented On   
2 3:28PM ; [facility] provided active listenin  
g, support and helped pt process though current   
symptoms   
and stressor(s). Discussed and explored past effectiveness of medication; 
identified   
objectives and future goals; promoted use of healthy coping mechanisms, and 
self-care   
practices  
   
                                                    Last Documented On   
2 3:19PM ; [facility]  
   
                                                    Reviewed side effects and Ri  
sks/Benefits analysis  
   
                                                    Last Documented On   
2 3:19PM ; [facility]  
   
                                                    Discussed nutritional needs   
teach healthy choices including fruits and   
vegetables  
   
                                                    Last Documented On   
2 3:15PM ; [facility]  
   
                                                    Patient education about a pr  
oper diet  
   
                                                    Last Documented On   
2 3:15PM ; [facility]  
   
                                                    Discussed concerns about exe  
rcise : promote physical activity  
   
                                                    Last Documented On   
2 3:15PM ; [facility] introduced pt to [facility] in  
tegrated model of care ~[facility] offered active listening  
and   
supportive feedback; normalized emotions and feelings, also provided pt time to   
process any current stressors ~[facility] discussed potential benefits of counseling 
and   
supported re-engaging, as needed. ~[facility] encouraged pt to continue to make time to
  
implement self-care regimen and use coping methods, as needed  
   
                                                    Last Documented On   
2 4:07PM ; [facility]  
   
                                                    Discussed nutritional needs   
teach healthy choices including fruits and   
vegetables  
   
                                                    Last Documented On   
2 3:15PM ; [facility]  
   
                                                    Patient education about a pr  
oper diet  
   
                                                    Last Documented On   
2 3:15PM ; [facility]  
   
                                                    Inquiry and counseling about  
 medication administration and compliance  
   
                                                    Last Documented On   
2 7:37PM ; [facility]  
   
                                                    Discussed concerns about exe  
rcise : promote physical activity  
   
                                                    Last Documented On   
2 3:15PM ; [facility]  
   
                                                    Patient goals discussed  
   
                                                    Last Documented On   
2 7:37PM ; [facility]  
   
                                                    Ansewred pt's questions re B  
orderlline Personality D/O and Bipolar D/O raised by  
  
psychiatrist at [place]. ~Validated and normalized patient?s feelings while   
assisting to process recent events  
   
                                                    Last Documented On   
1 1:33AM ; [facility]  
   
                                                    Discussed nutritional needs   
teach healthy choices including fruits and   
vegetables  
   
                                                    Last Documented On   
1 2:04PM ; [facility]  
   
                                                    Patient education about a pr  
oper diet  
   
                                                    Last Documented On   
1 2:04PM ; [facility]  
   
                                                    Discussed concerns about exe  
rcise : promote physical activity  
   
                                                    Last Documented On   
1 2:04PM ; [facility] provided active listenin  
g, support and helped patient process through   
current   
symptoms and stressors with ongoing mental health concerns and medication 
changes.   
~[facility] discussed coping skills and supports that patient is implementing.  
discussed   
implementing coping skills as discussed with counseling and attending weekly   
appointments as scheduled with counselor. Patient was encouraged to continue 
writing   
down concerns with medications and discuss with providers. ~[facility] reminded patient
of   
crisis resources should they be needed. Patient reports having crisis resources 
and   
could return to ER  
   
                                                    Last Documented On   
1 10:17AM ; [facility]  
   
                                                    Patient education about a pr  
oper diet  
   
                                                    Last Documented On   
1 5:17PM ; [facility]  
   
                                                    Patient education about meal  
 planning  
   
                                                    Last Documented On   
1 5:17PM ; [facility]  
   
                                                    Education about changing eat  
ing habits  
   
                                                    Last Documented On   
1 5:17PM ; [facility]  
   
                                                    Patient education about high  
 fiber diet  
   
                                                    Last Documented On   
1 5:17PM ; [facility]  
   
                                                    Patient education about low   
fat diet  
   
                                                    Last Documented On   
1 5:17PM ; [facility]  
   
                                                    Patient education about low   
cholesterol diet  
   
                                                    Last Documented On   
1 5:17PM ; [facility]  
   
                                                    Patient education about low   
carbohydrate diet  
   
                                                    Last Documented On   
1 5:17PM ; [facility]  
   
                                                    Patient education about high  
 protein diet  
   
                                                    Last Documented On   
1 5:17PM ; [facility] offered active and suppo  
rtive listening, normalized emotions and feelings,   
and   
processed current stressors. ~[facility] discussed resources for finding a counselor 
and   
provided list of local resources. [facility] discussed patients coping skills and 
supports   
and encouraged patient to continue to implement. [facility] reminded patient of crisis
  
resources should they be needed  
   
                                                    Last Documented On   
1 7:15PM ; [facility]  
   
                                                    Discussed nutritional needs   
teach healthy choices including fruits and   
vegetables  
   
                                                    Last Documented On   
1 11:38AM ; [facility]  
   
                                                    Patient education about a pr  
oper diet  
   
                                                    Last Documented On   
1 11:38AM ; [facility]  
   
                                                    Patient education about a pr  
oper diet  
   
                                                    Last Documented On   
1 12:19PM ; [facility]  
   
                                                    Patient education about meal  
 planning  
   
                                                    Last Documented On   
1 12:19PM ; [facility]  
   
                                                    Education about changing eat  
ing habits  
   
                                                    Last Documented On   
1 12:19PM ; [facility]  
   
                                                    Patient education about high  
 fiber diet  
   
                                                    Last Documented On   
1 12:19PM ; [facility]  
   
                                                    Patient education about low   
fat diet  
   
                                                    Last Documented On   
1 12:19PM ; [facility]  
   
                                                    Patient education about low   
cholesterol diet  
   
                                                    Last Documented On   
1 12:19PM ; [facility]  
   
                                                    Patient education about low   
carbohydrate diet  
   
                                                    Last Documented On   
1 12:19PM ; [facility]  
   
                                                    Patient education about high  
 protein diet  
   
                                                    Last Documented On  12:19PM ; [facility]  
   
                                                    Discussed concerns about exe  
rcise : promote physical activity  
   
                                                    Last Documented On   
1 11:38AM ; [facility]  
g, support and helped patient process through   
current   
symptoms and stressors related to family conflict. ~P discussed coping skills 
and   
supports with patient that can be implemented and reminded patient of ways to 
access   
additional resources. ~P discussed crisis resources and plan. Patient has 
crisis   
resources still available should they be needed  
   
                                                    Last Documented On 06/10/202  
1 7:04PM ; [facility]  
   
                                                    Discussed nutritional needs   
teach healthy choices including fruits and   
vegetables  
   
                                                    Last Documented On 06/10/202  
1 3:57PM ; [facility]  
   
                                                    Patient education about a pr  
oper diet  
   
                                                    Last Documented On 06/10/202  
1 3:57PM ; [facility]  
   
                                                    Discussed concerns about exe  
rcise : promote physical activity  
   
                                                    Last Documented On 06/10/202  
1 3:57PM ; [facility]. [facility] and PCP reassured   
patient of not sharing information with anyone unless she has signed a release 
for us   
to do so. ~P provided active listening, support and helped patient process 
through   
current symptoms and stressors. ~P discussed establishing counseling and   
psychiatry. [facility] discussed EMDR therapy and ways to find provider who does this 
type   
of therapy. ~[facility] discussed crisis resources should mood worsen, [facility] provided 
text   
hotline number for crisis. [facility] reviewed crisis plan with patient and patient is 
able   
to contact positive supports and family when feeling down  
   
                                                    Last Documented On   
1 11:46AM ; [facility]  
   
                                                    Discussed nutritional needs   
teach healthy choices including fruits and   
vegetables  
   
                                                    Last Documented On   
1 2:11PM ; [facility]  
   
                                                    Patient education about a pr  
oper diet  
   
                                                    Last Documented On   
1 2:11PM ; [facility]  
   
                                                    Discussed concerns about exe  
rcise : promote physical activity  
   
                                                    Last Documented On   
1 2:11PM ; [facility]  
Work Phone: [phone]  
  
Includes: Instructions for all patient encounters  
  
  
  
                                                    Education and Decision Aids   
were provided during visit for:  
   
                                                    ~*[facility] offered active and sup  
portive listening, normalized emotions and feelings,  
and   
processed ~current stressors. ~*Discussed engageing in counseling and looking 
into   
EMDR to address PTSD and increased nightmares  
   
                                                    Last Documented On   
4 4:14PM ; [facility]  
   
                                                    Discussed nutritional needs   
teach healthy choices including fruits and   
vegetables  
   
                                                    Last Documented On   
4 4:33PM ; [facility]  
   
                                                    Patient education about a pr  
oper diet  
   
                                                    Last Documented On   
4 4:33PM ; [facility]  
   
                                                    Discussed concerns about exe  
rcise : promote physical activity  
   
                                                    Last Documented On   
4 4:33PM ; [facility] offered active and suppo  
rtive listening and processed recent stressors. ~[facility]  
  
discussed coping skills and supports to implement in managing increased anxiety 
such   
as tools learned previously in therapy. ~[facility] discussed sleep hygiene strategies 
that   
can be implemented. ~[facility] encouraged patient to follow-up with specialists as   
scheduled  
   
                                                    Last Documented On   
4 3:26PM ; [facility]  
   
                                                    Discussed nutritional needs   
teach healthy choices including fruits and   
vegetables  
   
                                                    Last Documented On   
4 4:51PM ; [facility]  
   
                                                    Patient education about a pr  
oper diet  
   
                                                    Last Documented On   
4 4:51PM ; [facility]  
   
                                                    Discussed concerns about exe  
rcise : promote physical activity  
   
                                                    Last Documented On   
4 4:51PM ; [facility]  
   
                                                    Referred Patient to a [facility]  
es Self-Management Program  
   
                                                    Last Documented On   
4 5:22PM ; [facility] and [facility]o  
rtive listening and processed current stressors.   
~P   
discussed coping skills and supports that can be implemented to manage increased
  
anxiety and stressors. [facility] discussed potential of resuming counseling, even if 
for   
monthly visits to process ongoing stressors. ~[facility] encouraged patient to follow-
up on   
referrals as discussed with PCP  
   
                                                    Last Documented On   
4 10:08AM ; [facility]  
   
                                                    Discussed nutritional needs   
teach healthy choices including fruits and   
vegetables  
   
                                                    Last Documented On   
4 4:46PM ; [facility]  
   
                                                    Patient education about a pr  
oper diet  
   
                                                    Last Documented On   
4 4:46PM ; [facility]  
   
                                                    Discussed concerns about exe  
rcise : promote physical activity  
   
                                                    Last Documented On   
4 4:46PM ; [facility]  
   
                                                    Not requesting contraception  
   
                                                    Last Documented On   
4 4:46PM ; [facility]  
rtive listening and processed current stressors   
related   
to getting medications. ~P discussed coping skills and supports to implement 
in   
daily routine. ~[facility] discussed progress patient has felt they have made recently 
and   
encouraged continued follow-up with providers to address health  
   
                                                    Last Documented On   
4 5:16PM ; [facility]  
   
                                                    Discussed nutritional needs   
teach healthy choices including fruits and   
vegetables  
   
                                                    Last Documented On   
4 7:14PM ; [facility]  
   
                                                    Patient education about a pr  
oper diet  
   
                                                    Last Documented On   
4 7:14PM ; [facility]  
   
                                                    Discussed concerns about exe  
rcise : promote physical activity  
   
                                                    Last Documented On   
4 7:14PM ; [facility]  
   
                                                    Not requesting contraception  
   
                                                    Last Documented On   
4 7:14PM ; [facility]  
   
                                                    Discussed nutritional needs   
teach healthy choices including fruits and   
vegetables  
   
                                                    Last Documented On   
3 5:15PM ; [facility]  
   
                                                    Patient education about a pr  
oper diet  
   
                                                    Last Documented On   
3 5:15PM ; [facility]  
   
                                                    Discussed concerns about exe  
rcise : promote physical activity  
   
                                                    Last Documented On   
3 5:15PM ; [facility]  
   
                                                    Discussed nutritional needs   
teach healthy choices including fruits and   
vegetables  
   
                                                    Last Documented On 10/21/202  
2 1:42PM ; [facility]  
   
                                                    Patient education about a pr  
oper diet  
   
                                                    Last Documented On 10/21/202  
2 1:42PM ; [facility]  
   
                                                    Discussed concerns about exe  
rcise : promote physical activity  
   
                                                    Last Documented On 10/21/202  
2 1:42PM ; [facility] offered active listening  
 and supportive feedback; normalized emotions and   
feelings, also provided pt time to process any current stressors. ~Promoted and   
encouraged follow-through with scheduling psychiatric services  
   
                                                    Last Documented On   
2 3:21PM ; [facility] provided supportive, empa  
thic listening and reflective feedback. ~Explored,   
encouraged, and supported the pt to discuss current sx/mood, assess risk for   
harm/need, coping mechanisms, support network and safety planning. ~Supported 
pt's   
plan to f/up with Dr. Alonso, as planned at [place], [place]. ~Encouraged 
pt to   
use safety plan, if needed to ensure she remains safe  
   
                                                    Last Documented On   
2 2:27PM ; [facility]  
   
                                                    Discussed nutritional needs   
teach healthy choices including fruits and   
vegetables  
   
                                                    Last Documented On   
2 3:20PM ; [facility]  
   
                                                    Patient education about a pr  
oper diet  
   
                                                    Last Documented On   
2 3:20PM ; [facility]  
   
                                                    Discussed concerns about exe  
rcise : promote physical activity ~ ~Will restart   
trazodone and prazosin ~ ~Patient is planning to have brother stay with her for 
a few   
days for emotional support ~ ~Follow up with PCP at next scheduled visit ~ ~Call
  
psychiatrist office to schedule appt ~ ~Call counselor  
   
                                                    Last Documented On   
2 9:35AM ; [facility]  
   
                                                    Provided supportive listenin  
g and empathic feedback; encouraged, explored, and   
supported the pt as she processed current symptoms, Issues, and concerns. 
~Discussed   
past tx and explored current needs/options. Acknowledged and validated pt's 
thoughts   
and emotions. ~Explored coping mechanisms and support network; utilized 
opportunity   
for safety planning; promoted seeking positive support and seeking help, as 
needed  
   
                                                    Last Documented On   
2 3:28PM ; [facility] provided active listenin  
g, support and helped pt process though current   
symptoms   
and stressor(s). Discussed and explored past effectiveness of medication; 
identified   
objectives and future goals; promoted use of healthy coping mechanisms, and 
self-care   
practices  
   
                                                    Last Documented On   
2 3:19PM ; [facility]  
   
                                                    Reviewed side effects and Ri  
sks/Benefits analysis  
   
                                                    Last Documented On   
2 3:19PM ; [facility]  
   
                                                    Discussed nutritional needs   
teach healthy choices including fruits and   
vegetables  
   
                                                    Last Documented On   
2 3:15PM ; [facility]  
   
                                                    Patient education about a pr  
oper diet  
   
                                                    Last Documented On   
2 3:15PM ; [facility]  
   
                                                    Discussed concerns about exe  
rcise : promote physical activity  
   
                                                    Last Documented On   
2 3:15PM ; [facility] introduced pt to [facility] in  
tegrated model of care ~P offered active listening  
and   
supportive feedback; normalized emotions and feelings, also provided pt time to   
process any current stressors ~P discussed potential benefits of counseling 
and   
supported re-engaging, as needed. ~P encouraged pt to continue to make time to
  
implement self-care regimen and use coping methods, as needed  
   
                                                    Last Documented On   
2 4:07PM ; [facility]  
   
                                                    Discussed nutritional needs   
teach healthy choices including fruits and   
vegetables  
   
                                                    Last Documented On   
2 3:15PM ; [facility]  
   
                                                    Patient education about a pr  
oper diet  
   
                                                    Last Documented On   
2 3:15PM ; [facility]  
   
                                                    Inquiry and counseling about  
 medication administration and compliance  
   
                                                    Last Documented On   
2 7:37PM ; [facility]  
   
                                                    Discussed concerns about exe  
rcise : promote physical activity  
   
                                                    Last Documented On   
2 3:15PM ; [facility]  
   
                                                    Patient goals discussed  
   
                                                    Last Documented On   
2 7:37PM ; [facility]  
   
                                                    Ansewred pt's questions re B  
orderlline Personality D/O and Bipolar D/O raised by  
  
psychiatrist at [place]. ~Validated and normalized patient?s feelings while   
assisting to process recent events  
   
                                                    Last Documented On   
1 1:33AM ; [facility]  
   
                                                    Discussed nutritional needs   
teach healthy choices including fruits and   
vegetables  
   
                                                    Last Documented On   
1 2:04PM ; [facility]  
   
                                                    Patient education about a pr  
oper diet  
   
                                                    Last Documented On   
1 2:04PM ; [facility]  
   
                                                    Discussed concerns about exe  
rcise : promote physical activity  
   
                                                    Last Documented On   
1 2:04PM ; [facility] provided active listenin  
g, support and helped patient process through   
current   
symptoms and stressors with ongoing mental health concerns and medication 
changes.   
[facility] discussed coping skills and supports that patient is implementing.  
discussed   
implementing coping skills as discussed with counseling and attending weekly   
appointments as scheduled with counselor. Patient was encouraged to continue 
writing   
down concerns with medications and discuss with providers. [facility] reminded patient
of   
crisis resources should they be needed. Patient reports having crisis resources 
and   
could return to ER  
   
                                                    Last Documented On   
1 10:17AM ; [facility]  
   
                                                    Patient education about a pr  
oper diet  
   
                                                    Last Documented On   
1 5:17PM ; [facility]  
   
                                                    Patient education about meal  
 planning  
   
                                                    Last Documented On   
1 5:17PM ; [facility]  
   
                                                    Education about changing eat  
ing habits  
   
                                                    Last Documented On   
1 5:17PM ; [facility]  
   
                                                    Patient education about high  
 fiber diet  
   
                                                    Last Documented On   
1 5:17PM ; [facility]  
   
                                                    Patient education about low   
fat diet  
   
                                                    Last Documented On   
1 5:17PM ; [facility]  
   
                                                    Patient education about low   
cholesterol diet  
   
                                                    Last Documented On   
1 5:17PM ; [facility]  
   
                                                    Patient education about low   
carbohydrate diet  
   
                                                    Last Documented On   
1 5:17PM ; [facility]  
   
                                                    Patient education about high  
 protein diet  
   
                                                    Last Documented On   
1 5:17PM ; [facility] offered active and suppo  
rtive listening, normalized emotions and feelings,   
and   
processed current stressors. [facility] discussed resources for finding a counselor 
and   
provided list of local resources. [facility] discussed patients coping skills and 
supports   
and encouraged patient to continue to implement. [facility] reminded patient of crisis
  
resources should they be needed  
   
                                                    Last Documented On   
1 7:15PM ; [facility]  
   
                                                    Discussed nutritional needs   
teach healthy choices including fruits and   
vegetables  
   
                                                    Last Documented On   
1 11:38AM ; [facility]  
   
                                                    Patient education about a pr  
oper diet  
   
                                                    Last Documented On   
1 11:38AM ; [facility]  
   
                                                    Patient education about a pr  
oper diet  
   
                                                    Last Documented On   
1 12:19PM ; [facility]  
   
                                                    Patient education about meal  
 planning  
   
                                                    Last Documented On   
1 12:19PM ; [facility]  
   
                                                    Education about changing eat  
ing habits  
   
                                                    Last Documented On   
1 12:19PM ; [facility]  
   
                                                    Patient education about high  
 fiber diet  
   
                                                    Last Documented On   
1 12:19PM ; [facility]  
   
                                                    Patient education about low   
fat diet  
   
                                                    Last Documented On   
1 12:19PM ; [facility]  
   
                                                    Patient education about low   
cholesterol diet  
   
                                                    Last Documented On   
1 12:19PM ; [facility]  
   
                                                    Patient education about low   
carbohydrate diet  
   
                                                    Last Documented On   
1 12:19PM ; [facility]  
   
                                                    Patient education about high  
 protein diet  
   
                                                    Last Documented On   
1 12:19PM ; [facility]  
   
                                                    Discussed concerns about exe  
rcise : promote physical activity  
   
                                                    Last Documented On   
1 11:38AM ; [facility]  
[facility], support and helped patient process through   
current   
symptoms and stressors related to family conflict. ~P discussed coping skills 
and   
supports with patient that can be implemented and reminded patient of ways to 
access   
additional resources. ~P discussed crisis resources and plan. Patient has 
crisis   
resources still available should they be needed  
   
                                                    Last Documented On 06/10/202  
1 7:04PM ; [facility]  
   
                                                    Discussed nutritional needs   
teach healthy choices including fruits and   
vegetables  
   
                                                    Last Documented On 06/10/202  
1 3:57PM ; [facility]  
   
                                                    Patient education about a pr  
oper diet  
   
                                                    Last Documented On 06/10/202  
1 3:57PM ; [facility]  
   
                                                    Discussed concerns about exe  
rcise : promote physical activity  
   
                                                    Last Documented On 06/10/202  
1 3:57PM ; [facility]. [facility] and PCP reassured   
patient of not sharing information with anyone unless she has signed a release 
for us   
to do so. ~P provided active listening, support and helped patient process 
through   
current symptoms and stressors. ~P discussed establishing counseling and   
psychiatry. [facility] discussed EMDR therapy and ways to find provider who does this 
type   
of therapy. ~P discussed crisis resources should mood worsen, [facility] provided 
text   
hotline number for crisis. [facility] reviewed crisis plan with patient and patient is 
able   
to contact positive supports and family when feeling down  
   
                                                    Last Documented On   
1 11:46AM ; [facility]  
   
                                                    Discussed nutritional needs   
teach healthy choices including fruits and   
vegetables  
   
                                                    Last Documented On   
1 2:11PM ; [facility]  
   
                                                    Patient education about a pr  
oper diet  
   
                                                    Last Documented On   
1 2:11PM ; [facility]  
   
                                                    Discussed concerns about exe  
rcise : promote physical activity  
   
                                                    Last Documented On   
1 2:11PM ; [facility]  
Work Phone: [phone]  
  
Includes: Instructions for all patient encounters  
  
  
  
                                                    Education and Decision Aids   
were provided during visit for:  
   
                                                    ~*[facility] offered active and sup  
portive listening, normalized emotions and feelings,  
and   
processed ~current stressors. ~*Discussed engageing in counseling and looking 
into   
EMDR to address PTSD and increased nightmares  
   
                                                    Last Documented On   
4 4:14PM ; [facility]  
   
                                                    Discussed nutritional needs   
teach healthy choices including fruits and   
vegetables  
   
                                                    Last Documented On   
4 4:33PM ; [facility]  
   
                                                    Patient education about a pr  
oper diet  
   
                                                    Last Documented On   
4 4:33PM ; [facility]  
   
                                                    Discussed concerns about exe  
rcise : promote physical activity  
   
                                                    Last Documented On   
4 4:33PM ; [facility] offered active and suppo  
rtive listening and processed recent stressors. ~[facility]  
  
discussed coping skills and supports to implement in managing increased anxiety 
such   
as tools learned previously in therapy. ~[facility] discussed sleep hygiene strategies 
that   
can be implemented. ~[facility] encouraged patient to follow-up with specialists as   
scheduled  
   
                                                    Last Documented On   
4 3:26PM ; [facility]  
   
                                                    Discussed nutritional needs   
teach healthy choices including fruits and   
vegetables  
   
                                                    Last Documented On   
4 4:51PM ; [facility]  
   
                                                    Patient education about a pr  
oper diet  
   
                                                    Last Documented On   
4 4:51PM ; [facility]  
   
                                                    Discussed concerns about exe  
rcise : promote physical activity  
   
                                                    Last Documented On   
4 4:51PM ; [facility]  
   
                                                    Referred Patient to a [facility]  
es Self-Management Program  
   
                                                    Last Documented On   
4 5:22PM ; [facility] offered active and suppo  
rtive listening and processed current stressors.   
~[facility]   
discussed coping skills and supports that can be implemented to manage increased
  
anxiety and stressors. [facility] discussed potential of resuming counseling, even if 
for   
monthly visits to process ongoing stressors. [facility] encouraged patient to follow-
up on   
referrals as discussed with PCP  
   
                                                    Last Documented On   
4 10:08AM ; [facility]  
   
                                                    Discussed nutritional needs   
teach healthy choices including fruits and   
vegetables  
   
                                                    Last Documented On   
4 4:46PM ; [facility]  
   
                                                    Patient education about a pr  
oper diet  
   
                                                    Last Documented On   
4 4:46PM ; [facility]  
   
                                                    Discussed concerns about exe  
rcise : promote physical activity  
   
                                                    Last Documented On   
4 4:46PM ; [facility]  
   
                                                    Not requesting contraception  
   
                                                    Last Documented On   
4 4:46PM ; [facility] offered active and suppo  
rtive listening and processed current stressors   
related   
to getting medications. ~[facility] discussed coping skills and supports to implement 
in   
daily routine. [facility] discussed progress patient has felt they have made recently 
and   
encouraged continued follow-up with providers to address health  
   
                                                    Last Documented On   
4 5:16PM ; [facility]  
   
                                                    Discussed nutritional needs   
teach healthy choices including fruits and   
vegetables  
   
                                                    Last Documented On   
4 7:14PM ; [facility]  
   
                                                    Patient education about a pr  
oper diet  
   
                                                    Last Documented On   
4 7:14PM ; [facility]  
   
                                                    Discussed concerns about exe  
rcise : promote physical activity  
   
                                                    Last Documented On   
4 7:14PM ; [facility]  
   
                                                    Not requesting contraception  
   
                                                    Last Documented On   
4 7:14PM ; [facility]  
   
                                                    Discussed nutritional needs   
teach healthy choices including fruits and   
vegetables  
   
                                                    Last Documented On   
3 5:15PM ; [facility]  
   
                                                    Patient education about a pr  
oper diet  
   
                                                    Last Documented On   
3 5:15PM ; [facility]  
   
                                                    Discussed concerns about exe  
rcise : promote physical activity  
   
                                                    Last Documented On   
3 5:15PM ; [facility]  
   
                                                    Discussed nutritional needs   
teach healthy choices including fruits and   
vegetables  
   
                                                    Last Documented On 10/21/202  
2 1:42PM ; [facility]  
   
                                                    Patient education about a pr  
oper diet  
   
                                                    Last Documented On 10/21/202  
2 1:42PM ; [facility]  
   
                                                    Discussed concerns about exe  
rcise : promote physical activity  
   
                                                    Last Documented On 10/21/202  
2 1:42PM ; [facility] offered active listening  
 and supportive feedback; normalized emotions and   
feelings, also provided pt time to process any current stressors. ~Promoted and   
encouraged follow-through with scheduling psychiatric services  
   
                                                    Last Documented On   
2 3:21PM ; [facility] provided supportive, empa  
thic listening and reflective feedback. ~Explored,   
encouraged, and supported the pt to discuss current sx/mood, assess risk for   
harm/need, coping mechanisms, support network and safety planning. ~Supported 
pt's   
plan to f/up with Dr. Alonso, as planned at [place], [place]. ~Encouraged 
pt to   
use safety plan, if needed to ensure she remains safe  
   
                                                    Last Documented On   
2 2:27PM ; [facility]  
   
                                                    Discussed nutritional needs   
teach healthy choices including fruits and   
vegetables  
   
                                                    Last Documented On   
2 3:20PM ; [facility]  
   
                                                    Patient education about a pr  
oper diet  
   
                                                    Last Documented On   
2 3:20PM ; [facility]  
   
                                                    Discussed concerns about exe  
rcise : promote physical activity ~ ~Will restart   
trazodone and prazosin ~ ~Patient is planning to have brother stay with her for 
a few   
days for emotional support ~ ~Follow up with PCP at next scheduled visit ~ ~Call
  
psychiatrist office to schedule appt ~ ~Call counselor  
   
                                                    Last Documented On   
2 9:35AM ; [facility]  
   
                                                    Provided supportive listenin  
g and empathic feedback; encouraged, explored, and   
supported the pt as she processed current symptoms, Issues, and concerns. 
~Discussed   
past tx and explored current needs/options. Acknowledged and validated pt's 
thoughts   
and emotions. ~Explored coping mechanisms and support network; utilized 
opportunity   
for safety planning; promoted seeking positive support and seeking help, as 
needed  
   
                                                    Last Documented On   
2 3:28PM ; [facility] provided active listenin  
g, support and helped pt process though current   
symptoms   
and stressor(s). Discussed and explored past effectiveness of medication; 
identified   
objectives and future goals; promoted use of healthy coping mechanisms, and 
self-care   
practices  
   
                                                    Last Documented On   
2 3:19PM ; [facility]  
   
                                                    Reviewed side effects and Ri  
sks/Benefits analysis  
   
                                                    Last Documented On   
2 3:19PM ; [facility]  
   
                                                    Discussed nutritional needs   
teach healthy choices including fruits and   
vegetables  
   
                                                    Last Documented On   
2 3:15PM ; [facility]  
   
                                                    Patient education about a pr  
oper diet  
   
                                                    Last Documented On   
2 3:15PM ; [facility]  
   
                                                    Discussed concerns about exe  
rcise : promote physical activity  
   
                                                    Last Documented On   
2 3:15PM ; [facility] introduced pt to [facility] in  
tegrated model of care ~P offered active listening  
and   
supportive feedback; normalized emotions and feelings, also provided pt time to   
process any current stressors ~P discussed potential benefits of counseling 
and   
supported re-engaging, as needed. ~P encouraged pt to continue to make time to
  
implement self-care regimen and use coping methods, as needed  
   
                                                    Last Documented On   
2 4:07PM ; [facility]  
   
                                                    Discussed nutritional needs   
teach healthy choices including fruits and   
vegetables  
   
                                                    Last Documented On   
2 3:15PM ; [facility]  
   
                                                    Patient education about a pr  
oper diet  
   
                                                    Last Documented On   
2 3:15PM ; [facility]  
   
                                                    Inquiry and counseling about  
 medication administration and compliance  
   
                                                    Last Documented On   
2 7:37PM ; [facility]  
   
                                                    Discussed concerns about exe  
rcise : promote physical activity  
   
                                                    Last Documented On   
2 3:15PM ; [facility]  
   
                                                    Patient goals discussed  
   
                                                    Last Documented On   
2 7:37PM ; [facility]  
   
                                                    Ansewred pt's questions re B  
orderlline Personality D/O and Bipolar D/O raised by  
  
psychiatrist at [place]. ~Validated and normalized patient?s feelings while   
assisting to process recent events  
   
                                                    Last Documented On   
1 1:33AM ; [facility]  
   
                                                    Discussed nutritional needs   
teach healthy choices including fruits and   
vegetables  
   
                                                    Last Documented On   
1 2:04PM ; [facility]  
   
                                                    Patient education about a pr  
oper diet  
   
                                                    Last Documented On   
1 2:04PM ; [facility]  
   
                                                    Discussed concerns about exe  
rcise : promote physical activity  
   
                                                    Last Documented On   
1 2:04PM ; [facility] provided active listenin  
g, support and helped patient process through   
current   
symptoms and stressors with ongoing mental health concerns and medication 
changes.   
[facility] discussed coping skills and supports that patient is implementing.  
discussed   
implementing coping skills as discussed with counseling and attending weekly   
appointments as scheduled with counselor. Patient was encouraged to continue 
writing   
down concerns with medications and discuss with providers. [facility] reminded patient
of   
crisis resources should they be needed. Patient reports having crisis resources 
and   
could return to ER  
   
                                                    Last Documented On   
1 10:17AM ; [facility]  
   
                                                    Patient education about a pr  
oper diet  
   
                                                    Last Documented On   
1 5:17PM ; [facility]  
   
                                                    Patient education about meal  
 planning  
   
                                                    Last Documented On  5:17PM ; [facility]  
   
                                                    Education about changing eat  
ing habits  
   
                                                    Last Documented On  5:17PM ; [facility]  
   
                                                    Patient education about high  
 fiber diet  
   
                                                    Last Documented On  5:17PM ; [facility]  
   
                                                    Patient education about low   
fat diet  
   
                                                    Last Documented On   
1 5:17PM ; [facility]  
   
                                                    Patient education about low   
cholesterol diet  
   
                                                    Last Documented On   
1 5:17PM ; [facility]  
   
                                                    Patient education about low   
carbohydrate diet  
   
                                                    Last Documented On   
1 5:17PM ; [facility]  
   
                                                    Patient education about high  
 protein diet  
   
                                                    Last Documented On   
1 5:17PM ; [facility] offered active and suppo  
rtive listening, normalized emotions and feelings,   
and   
processed current stressors. [facility] discussed resources for finding a counselor 
and   
provided list of local resources. [facility] discussed patients coping skills and 
supports   
and encouraged patient to continue to implement. [facility] reminded patient of crisis
  
resources should they be needed  
   
                                                    Last Documented On   
1 7:15PM ; [facility]  
   
                                                    Discussed nutritional needs   
teach healthy choices including fruits and   
vegetables  
   
                                                    Last Documented On   
1 11:38AM ; [facility]  
   
                                                    Patient education about a pr  
oper diet  
   
                                                    Last Documented On   
1 11:38AM ; [facility]  
   
                                                    Patient education about a pr  
oper diet  
   
                                                    Last Documented On   
1 12:19PM ; [facility]  
   
                                                    Patient education about meal  
 planning  
   
                                                    Last Documented On   
1 12:19PM ; [facility]  
   
                                                    Education about changing eat  
ing habits  
   
                                                    Last Documented On   
1 12:19PM ; [facility]  
   
                                                    Patient education about high  
 fiber diet  
   
                                                    Last Documented On   
1 12:19PM ; [facility]  
   
                                                    Patient education about low   
fat diet  
   
                                                    Last Documented On   
1 12:19PM ; [facility]  
   
                                                    Patient education about low   
cholesterol diet  
   
                                                    Last Documented On   
1 12:19PM ; [facility]  
   
                                                    Patient education about low   
carbohydrate diet  
   
                                                    Last Documented On   
1 12:19PM ; [facility]  
   
                                                    Patient education about high  
 protein diet  
   
                                                    Last Documented On   
1 12:19PM ; [facility]  
   
                                                    Discussed concerns about exe  
rcise : promote physical activity  
   
                                                    Last Documented On   
1 11:38AM ; [facility]  
[facility], support and helped patient process through   
current   
symptoms and stressors related to family conflict. ~P discussed coping skills 
and   
supports with patient that can be implemented and reminded patient of ways to 
access   
additional resources. ~P discussed crisis resources and plan. Patient has 
crisis   
resources still available should they be needed  
   
                                                    Last Documented On 06/10/202  
1 7:04PM ; [facility]  
   
                                                    Discussed nutritional needs   
teach healthy choices including fruits and   
vegetables  
   
                                                    Last Documented On 06/10/202  
1 3:57PM ; [facility]  
   
                                                    Patient education about a pr  
oper diet  
   
                                                    Last Documented On 06/10/202  
1 3:57PM ; [facility]  
   
                                                    Discussed concerns about exe  
rcise : promote physical activity  
   
                                                    Last Documented On 06/10/202  
1 3:57PM ; [facility]. [facility] and PCP reassured   
patient of not sharing information with anyone unless she has signed a release 
for us   
to do so. ~P provided active listening, support and helped patient process 
through   
current symptoms and stressors. ~P discussed establishing counseling and   
psychiatry. [facility] discussed EMDR therapy and ways to find provider who does this 
type   
of therapy. ~P discussed crisis resources should mood worsen, P provided 
text   
hotline number for crisis. P reviewed crisis plan with patient and patient is 
able   
to contact positive supports and family when feeling down  
   
                                                    Last Documented On   
1 11:46AM ; [facility]  
   
                                                    Discussed nutritional needs   
teach healthy choices including fruits and   
vegetables  
   
                                                    Last Documented On   
1 2:11PM ; [facility]  
   
                                                    Patient education about a pr  
oper diet  
   
                                                    Last Documented On   
1 2:11PM ; [facility]  
   
                                                    Discussed concerns about exe  
rcise : promote physical activity  
   
                                                    Last Documented On   
1 2:11PM ; [facility]  
Work Phone: [phone]  
  
Includes: Instructions for all patient encounters  
  
  
  
                                                    Education and Decision Aids   
were provided during visit for:  
   
                                                    ~*P offered active and sup  
portive listening, normalized emotions and feelings,  
and   
processed ~current stressors. ~*Discussed engageing in counseling and looking 
into   
EMDR to address PTSD and increased nightmares  
   
                                                    Last Documented On   
4 4:14PM ; [facility]  
   
                                                    Discussed nutritional needs   
teach healthy choices including fruits and   
vegetables  
   
                                                    Last Documented On   
4 4:33PM ; [facility]  
   
                                                    Patient education about a pr  
oper diet  
   
                                                    Last Documented On   
4 4:33PM ; [facility]  
   
                                                    Discussed concerns about exe  
rcise : promote physical activity  
   
                                                    Last Documented On   
4 4:33PM ; [facility]o  
rtive listening and processed recent stressors. ~P  
  
discussed coping skills and supports to implement in managing increased anxiety 
such   
as tools learned previously in therapy. ~P discussed sleep hygiene strategies 
that   
can be implemented. ~[facility] encouraged patient to follow-up with specialists as   
scheduled  
   
                                                    Last Documented On   
4 3:26PM ; [facility]  
   
                                                    Discussed nutritional needs   
teach healthy choices including fruits and   
vegetables  
   
                                                    Last Documented On   
4 4:51PM ; [facility]  
   
                                                    Patient education about a pr  
oper diet  
   
                                                    Last Documented On   
4 4:51PM ; [facility]  
   
                                                    Discussed concerns about exe  
rcise : promote physical activity  
   
                                                    Last Documented On   
4 4:51PM ; [facility]  
   
                                                    Referred Patient to a [facility]  
es Self-Management Program  
   
                                                    Last Documented On   
4 5:22PM ; [facility] offered active and suppo  
rtive listening and processed current stressors.   
~P   
discussed coping skills and supports that can be implemented to manage increased
  
anxiety and stressors. P discussed potential of resuming counseling, even if 
for   
monthly visits to process ongoing stressors. ~[facility] encouraged patient to follow-
up on   
referrals as discussed with PCP  
   
                                                    Last Documented On   
4 10:08AM ; [facility]  
   
                                                    Discussed nutritional needs   
teach healthy choices including fruits and   
vegetables  
   
                                                    Last Documented On   
4 4:46PM ; [facility]  
   
                                                    Patient education about a pr  
oper diet  
   
                                                    Last Documented On   
4 4:46PM ; [facility]  
   
                                                    Discussed concerns about exe  
rcise : promote physical activity  
   
                                                    Last Documented On   
4 4:46PM ; [facility]  
   
                                                    Not requesting contraception  
   
                                                    Last Documented On   
4 4:46PM ; [facility] offered active and suppo  
rtive listening and processed current stressors   
related   
to getting medications. ~P discussed coping skills and supports to implement 
in   
daily routine. ~[facility] discussed progress patient has felt they have made recently 
and   
encouraged continued follow-up with providers to address health  
   
                                                    Last Documented On   
4 5:16PM ; [facility]  
   
                                                    Discussed nutritional needs   
teach healthy choices including fruits and   
vegetables  
   
                                                    Last Documented On   
4 7:14PM ; [facility]  
   
                                                    Patient education about a pr  
oper diet  
   
                                                    Last Documented On   
4 7:14PM ; [facility]  
   
                                                    Discussed concerns about exe  
rcise : promote physical activity  
   
                                                    Last Documented On   
4 7:14PM ; [facility]  
   
                                                    Not requesting contraception  
   
                                                    Last Documented On   
4 7:14PM ; [facility]  
   
                                                    Discussed nutritional needs   
teach healthy choices including fruits and   
vegetables  
   
                                                    Last Documented On   
3 5:15PM ; [facility]  
   
                                                    Patient education about a pr  
oper diet  
   
                                                    Last Documented On   
3 5:15PM ; [facility]  
   
                                                    Discussed concerns about exe  
rcise : promote physical activity  
   
                                                    Last Documented On   
3 5:15PM ; [facility]  
   
                                                    Discussed nutritional needs   
teach healthy choices including fruits and   
vegetables  
   
                                                    Last Documented On 10/21/202  
2 1:42PM ; [facility]  
   
                                                    Patient education about a pr  
oper diet  
   
                                                    Last Documented On 10/21/202  
2 1:42PM ; [facility]  
   
                                                    Discussed concerns about exe  
rcise : promote physical activity  
   
                                                    Last Documented On 10/21/202  
2 1:42PM ; [facility] offered active listening  
 and supportive feedback; normalized emotions and   
feelings, also provided pt time to process any current stressors. ~Promoted and   
encouraged follow-through with scheduling psychiatric services  
   
                                                    Last Documented On   
2 3:21PM ; [facility] provided supportive, empa  
thic listening and reflective feedback. ~Explored,   
encouraged, and supported the pt to discuss current sx/mood, assess risk for   
harm/need, coping mechanisms, support network and safety planning. ~Supported 
pt's   
plan to f/up with Dr. Alonso, as planned at [place], [place]. ~Encouraged 
pt to   
use safety plan, if needed to ensure she remains safe  
   
                                                    Last Documented On   
2 2:27PM ; [facility]  
   
                                                    Discussed nutritional needs   
teach healthy choices including fruits and   
vegetables  
   
                                                    Last Documented On   
2 3:20PM ; [facility]  
   
                                                    Patient education about a pr  
oper diet  
   
                                                    Last Documented On   
2 3:20PM ; [facility]  
   
                                                    Discussed concerns about exe  
rcise : promote physical activity ~ ~Will restart   
trazodone and prazosin ~ ~Patient is planning to have brother stay with her for 
a few   
days for emotional support ~ ~Follow up with PCP at next scheduled visit ~ ~Call
  
psychiatrist office to schedule appt ~ ~Call counselor  
   
                                                    Last Documented On   
2 9:35AM ; [facility]  
   
                                                    Provided supportive listenin  
g and empathic feedback; encouraged, explored, and   
supported the pt as she processed current symptoms, Issues, and concerns. 
~Discussed   
past tx and explored current needs/options. Acknowledged and validated pt's 
thoughts   
and emotions. ~Explored coping mechanisms and support network; utilized 
opportunity   
for safety planning; promoted seeking positive support and seeking help, as 
needed  
   
                                                    Last Documented On   
2 3:28PM ; [facility] provided active listenin  
g, support and helped pt process though current   
symptoms   
and stressor(s). Discussed and explored past effectiveness of medication; 
identified   
objectives and future goals; promoted use of healthy coping mechanisms, and 
self-care   
practices  
   
                                                    Last Documented On   
2 3:19PM ; [facility]  
   
                                                    Reviewed side effects and Ri  
sks/Benefits analysis  
   
                                                    Last Documented On   
2 3:19PM ; [facility]  
   
                                                    Discussed nutritional needs   
teach healthy choices including fruits and   
vegetables  
   
                                                    Last Documented On   
2 3:15PM ; [facility]  
   
                                                    Patient education about a pr  
oper diet  
   
                                                    Last Documented On   
2 3:15PM ; [facility]  
   
                                                    Discussed concerns about exe  
rcise : promote physical activity  
   
                                                    Last Documented On   
2 3:15PM ; [facility] introduced pt to [facility] in  
tegrated model of care ~P offered active listening  
and   
supportive feedback; normalized emotions and feelings, also provided pt time to   
process any current stressors ~[facility] discussed potential benefits of counseling 
and   
supported re-engaging, as needed. ~P encouraged pt to continue to make time to
  
implement self-care regimen and use coping methods, as needed  
   
                                                    Last Documented On   
2 4:07PM ; [facility]  
   
                                                    Discussed nutritional needs   
teach healthy choices including fruits and   
vegetables  
   
                                                    Last Documented On   
2 3:15PM ; [facility]  
   
                                                    Patient education about a pr  
oper diet  
   
                                                    Last Documented On   
2 3:15PM ; [facility]  
   
                                                    Inquiry and counseling about  
 medication administration and compliance  
   
                                                    Last Documented On   
2 7:37PM ; [facility]  
   
                                                    Discussed concerns about exe  
rcise : promote physical activity  
   
                                                    Last Documented On   
2 3:15PM ; [facility]  
   
                                                    Patient goals discussed  
   
                                                    Last Documented On   
2 7:37PM ; [facility]  
   
                                                    Ansewred pt's questions re B  
orderlline Personality D/O and Bipolar D/O raised by  
  
psychiatrist at [place]. ~Validated and normalized patient?s feelings while   
assisting to process recent events  
   
                                                    Last Documented On   
1 1:33AM ; [facility]  
   
                                                    Discussed nutritional needs   
teach healthy choices including fruits and   
vegetables  
   
                                                    Last Documented On   
1 2:04PM ; [facility]  
   
                                                    Patient education about a pr  
oper diet  
   
                                                    Last Documented On   
1 2:04PM ; [facility]  
   
                                                    Discussed concerns about exe  
rcise : promote physical activity  
   
                                                    Last Documented On   
1 2:04PM ; [facility] provided active listenin  
g, support and helped patient process through   
current   
symptoms and stressors with ongoing mental health concerns and medication 
changes.   
[facility] discussed coping skills and supports that patient is implementing.  
discussed   
implementing coping skills as discussed with counseling and attending weekly   
appointments as scheduled with counselor. Patient was encouraged to continue 
writing   
down concerns with medications and discuss with providers. [facility] reminded patient
of   
crisis resources should they be needed. Patient reports having crisis resources 
and   
could return to ER  
   
                                                    Last Documented On   
1 10:17AM ; [facility]  
   
                                                    Patient education about a pr  
oper diet  
   
                                                    Last Documented On  5:17PM ; [facility]  
   
                                                    Patient education about meal  
 planning  
   
                                                    Last Documented On  5:17PM ; [facility]  
   
                                                    Education about changing eat  
ing habits  
   
                                                    Last Documented On  5:17PM ; [facility]  
   
                                                    Patient education about high  
 fiber diet  
   
                                                    Last Documented On  5:17PM ; [facility]  
   
                                                    Patient education about low   
fat diet  
   
                                                    Last Documented On  5:17PM ; [facility]  
   
                                                    Patient education about low   
cholesterol diet  
   
                                                    Last Documented On  5:17PM ; [facility]  
   
                                                    Patient education about low   
carbohydrate diet  
   
                                                    Last Documented On  5:17PM ; [facility]  
   
                                                    Patient education about high  
 protein diet  
   
                                                    Last Documented On  5:17PM ; [facility] offered active and suppo  
rtive listening, normalized emotions and feelings,   
and   
processed current stressors. [facility] discussed resources for finding a counselor 
and   
provided list of local resources. [facility] discussed patients coping skills and 
supports   
and encouraged patient to continue to implement. [facility] reminded patient of crisis
  
resources should they be needed  
   
                                                    Last Documented On   
1 7:15PM ; [facility]  
   
                                                    Discussed nutritional needs   
teach healthy choices including fruits and   
vegetables  
   
                                                    Last Documented On  11:38AM ; [facility]  
   
                                                    Patient education about a pr  
oper diet  
   
                                                    Last Documented On  11:38AM ; [facility]  
   
                                                    Patient education about a pr  
oper diet  
   
                                                    Last Documented On   
1 12:19PM ; [facility]  
   
                                                    Patient education about meal  
 planning  
   
                                                    Last Documented On  12:19PM ; [facility]  
   
                                                    Education about changing eat  
ing habits  
   
                                                    Last Documented On   
1 12:19PM ; [facility]  
   
                                                    Patient education about high  
 fiber diet  
   
                                                    Last Documented On   
1 12:19PM ; [facility]  
   
                                                    Patient education about low   
fat diet  
   
                                                    Last Documented On   
1 12:19PM ; [facility]  
   
                                                    Patient education about low   
cholesterol diet  
   
                                                    Last Documented On   
1 12:19PM ; [facility]  
   
                                                    Patient education about low   
carbohydrate diet  
   
                                                    Last Documented On   
1 12:19PM ; [facility]  
   
                                                    Patient education about high  
 protein diet  
   
                                                    Last Documented On   
1 12:19PM ; [facility]  
   
                                                    Discussed concerns about exe  
rcise : promote physical activity  
   
                                                    Last Documented On   
1 11:38AM ; [facility]  
[facility], support and helped patient process through   
current   
symptoms and stressors related to family conflict. ~P discussed coping skills 
and   
supports with patient that can be implemented and reminded patient of ways to 
access   
additional resources. ~P discussed crisis resources and plan. Patient has 
crisis   
resources still available should they be needed  
   
                                                    Last Documented On 06/10/202  
1 7:04PM ; [facility]  
   
                                                    Discussed nutritional needs   
teach healthy choices including fruits and   
vegetables  
   
                                                    Last Documented On 06/10/202  
1 3:57PM ; [facility]  
   
                                                    Patient education about a pr  
oper diet  
   
                                                    Last Documented On 06/10/202  
1 3:57PM ; [facility]  
   
                                                    Discussed concerns about exe  
rcise : promote physical activity  
   
                                                    Last Documented On 06/10/202  
1 3:57PM ; [facility] and PCP reassured   
patient of not sharing information with anyone unless she has signed a release 
for us   
to do so. ~P provided active listening, support and helped patient process 
through   
current symptoms and stressors. ~P discussed establishing counseling and   
psychiatry. P discussed EMDR therapy and ways to find provider who does this 
type   
of therapy. ~P discussed crisis resources should mood worsen, P provided 
text   
hotline number for crisis. P reviewed crisis plan with patient and patient is 
able   
to contact positive supports and family when feeling down  
   
                                                    Last Documented On   
1 11:46AM ; [facility]  
   
                                                    Discussed nutritional needs   
teach healthy choices including fruits and   
vegetables  
   
                                                    Last Documented On   
1 2:11PM ; [facility]  
   
                                                    Patient education about a pr  
oper diet  
   
                                                    Last Documented On   
1 2:11PM ; [facility]  
   
                                                    Discussed concerns about exe  
rcise : promote physical activity  
   
                                                    Last Documented On   
1 2:11PM ; [facility]  
Work Phone: [phone] problem outcome Narrative  
  
Includes: Evaluations & Outcomes for active Goals  
  
No Outcomes [facility]ed[facility]  
Work Phone: [phone] note*   
  
Progress note  
  
  
  
                                Date            Encounter       Last Documented   
by  
   
                                10/07/2024      Chart Update    Last documented   
on 10/07/2024; 12:07 PM, Doreen Cabrera CNP;   
[facility]  
  
  
  
                                                      
  
  
** Active Problems & Conditions **  
- F90.9 - Attention-deficit Hyperactivity Disorder  
- F31.31 - Bipolar I Disorder, Most Recent Episode, Depressed Mild  
- E11.9 - Diabetes Mellitus Type 2 Without Complication  
- N94.6 - Dysmenorrhea  
- F43.10 - Post-traumatic Stress Disorder  
  
** Current Medication **  
- Alcohol Pads 70% use to cleanse skin prior to checking blood sugars, 90 days, 
3   
refills  
- ARIPiprazole 5 MG Oral Tablet take 1 tablet by mouth once daily, 30 days, 5 
refills  
- Atorvastatin Calcium 20 MG Oral Tablet take 1 tablet by mouth once daily at   
bedtime, 30 days, 5 refills  
- Blood Glucose System Sriram Kit use to check sugars once daily (use what is 
covered),   
30 days, 0 refills  
- busPIRone HCl 10 MG Oral Tablet take 1 tablet by mouth twice daily (d/c 5 mg 
rx),   
30 days, 5 refills  
- CareSens Lancets Miscellaneous use to check sugars once daily (dispense what 
is   
covered), 90 days, 3 refills  
- Colace 100 MG Oral Capsule take 1 capsule by mouth once daily at bedtime as 
needed   
for constipation, 30 days, 5 refills  
- [employer] Iron 325 (65 Fe) MG Oral Tablet take 1 tablet by mouth once daily, 30 
days, 5   
refills  
- Intuniv 1 MG Oral Tablet Extended Release 24 Hour take 1 tablet by mouth once 
daily   
at bedtime, 30 days, 5 refills  
- Januvia 50 MG Oral Tablet take 1 tablet by mouth once daily, 30 days, 5 
refills  
- lamoTRIgine 100 MG Oral Tablet take 1 tablet by mouth once daily, 30 days, 5   
refills  
- Lisinopril 5 MG Oral Tablet take 1 tablet by mouth once daily, 30 days, 5 
refills  
- MiraLax 17 GM/SCOOP Oral Powder mix 1 scoop with 8 ounces once daily, 30 days,
2   
refills  
- Narcan 4 MG/0.1ML Nasal Liquid spray in nostril for symptoms of overdose , may
  
repeat in 2 minutes if symptoms persist, 1 days, 0 refills  
- OneTouch Ultra In Vitro Strip USE ONE TEST STRIP TO CHECK BLOOD SUGARS ONCE 
DAILY,   
90 days, 3 refills  
- Prazosin HCl 1 MG Oral Capsule take 1 capsule by mouth twice daily, 30 days, 5
  
refills  
- SEROquel 50 MG Oral Tablet take 1 tablet by mouth once daily at bedtime (d/c   
trazodone), 30 days, 1 refills  
- Sertraline HCl 50 MG Oral Tablet take 1 tablet by mouth once daily, 30 days, 5
  
refills  
- Slynd 4 MG Oral Tablet take 1 tablet by mouth at the same time everyday to 
help   
regulate your period, 28 days, 2 refills  
  
** Past Medical/Surgical History **  
Reported:  
No Safety Measures. Has sex without a condom.  
Medical: No previous hospitalizations. Chronic illness and Sexually transmitted   
infection Partners sexually transmitted infection status known.  
Immunization History: Recent immunization for flu.  
Exposure: Exposure to COVID-19.  
Pregnancy: Previously pregnant 1 time(s) and para having 0 live birth(s). Not   
planning a pregnancy in the next year.  
Legal Documents: Consent form on file for procedure.  
Diagnoses:  
Polycystic Ovarian Syndrome (PCOS).  
Migraine headache.  
Psychiatric disorders biopolar disorder  
Anxiety disorder  
POTS.  
Procedural:  
- Insertion of ear pressure equalization tubes in both ears  
Surgical:  
- Tonsillectomy  
- Tonsillectomy with adenoidectomy  
  
** Allergies **  
- Abilify Reaction: groggy  
- Augmentin Reaction: Shock  
- Metformin Reaction: Nausea, Vomiting  
- NO KNOWN ENVIRONMENTAL ALLERGIES  
- NO KNOWN FOOD ALLERGIES  
- Sertraline Reaction: slow/groggy  
- Trazodone Hydrochloride Reaction: Panic attack  
  
** Family History **  
Paternal:  
Systemic hypertension  
Oncologic disorder  
Maternal:  
Systemic hypertension  
Epilepsy and recurrent seizures  
Psychiatric disorders  
Oncologic disorder  
Fraternal:  
Psychiatric disorders  
  
** Assessment **  
- D50.9 - Iron deficiency anemia, unspecified  
  
** Plan **  
StartCited- Iron deficiency anemia, unspecified  
Lab: Iron and TIBC  
Lab: CBC With Differential/Platelet  
EndCited  
** Care Team **  
- Doreen Cabrera CNP  
  
  
[facility]No Reason for 
Referral RecordedFranc[facility]  
Work Phone: [phone] of systems Narrative - Reported  
  
Review of Systems not supported for this document type  
  
No Review of Systems RecordedFranc[facility]an [facility]  
Work Phone: [phone]  
  
Summary Purpose  
  
  
                                                      
  
  
  
Family History  
  
  
                                        Description         Last Updated  
   
                                        Maternal history of epilepsy and recurre  
nt seizures 2021  
   
                                        Fraternal history of psychiatric disorde  
rs 2021  
   
                                        Maternal history of hypertension   
021  
   
                                        Maternal history of oncologic disorder 0  
2021  
   
                                        Maternal history of psychiatric disorder  
s 2021  
   
                                        Paternal history of hypertension   
021  
   
                                        Paternal history of oncologic disorder 0  
2021  
  
  
  
                                        Description         Last Updated  
   
                                        Maternal history of epilepsy and recurre  
nt seizures 2021  
  
  
  
                                                    Last Documented On   
1 4:06PM ; [facility]  
  
  
  
                                        Fraternal history of psychiatric disorde  
rs 2021  
   
                                        Maternal history of hypertension   
021  
   
                                        Maternal history of oncologic disorder 0  
2021  
   
                                        Maternal history of psychiatric disorder  
s 2021  
   
                                        Paternal history of hypertension   
021  
   
                                        Paternal history of oncologic disorder 0  
2021  
  
  
  
                                        Description         Last Updated  
   
                                        Maternal history of epilepsy and recurre  
nt seizures 2021  
  
  
  
                                                    Last Documented On   
1 4:06PM ; [facility]  
  
  
  
                                        Fraternal history of psychiatric disorde  
rs 2021  
   
                                        Maternal history of hypertension   
021  
   
                                        Maternal history of oncologic disorder 0  
2021  
   
                                        Maternal history of psychiatric disorder  
s 2021  
   
                                        Paternal history of hypertension   
021  
   
                                        Paternal history of oncologic disorder 0  
2021  
  
  
  
                                        Description         Last Updated  
   
                                        Maternal history of epilepsy and recurre  
nt seizures 2021  
  
  
  
                                                    Last Documented On   
1 4:06PM ; [facility]  
  
  
  
                                        Fraternal history of psychiatric disorde  
rs 2021  
   
                                        Maternal history of hypertension   
021  
   
                                        Maternal history of oncologic disorder 0  
2021  
   
                                        Maternal history of psychiatric disorder  
s 2021  
   
                                        Paternal history of hypertension   
021  
   
                                        Paternal history of oncologic disorder 0  
2021  
  
  
  
                                        Description         Last Updated  
   
                                        Maternal history of epilepsy and recurre  
nt seizures 2021  
  
  
  
                                                    Last Documented On   
1 4:06PM ; [facility]  
  
  
  
                                        Fraternal history of psychiatric disorde  
rs 2021  
   
                                        Maternal history of hypertension   
021  
   
                                        Maternal history of oncologic disorder 0  
2021  
   
                                        Maternal history of psychiatric disorder  
s 2021  
   
                                        Paternal history of hypertension   
021  
   
                                        Paternal history of oncologic disorder 0  
2021  
  
  
  
                                        Description         Last Updated  
   
                                        Maternal history of epilepsy and recurre  
nt seizures 2021  
  
  
  
                                                    Last Documented On   
1 4:06PM ; [facility]  
  
  
  
                                        Fraternal history of psychiatric disorde  
rs 2021  
   
                                        Maternal history of hypertension   
021  
   
                                        Maternal history of oncologic disorder 0  
2021  
   
                                        Maternal history of psychiatric disorder  
s 2021  
   
                                        Paternal history of hypertension   
021  
   
                                        Paternal history of oncologic disorder 0  
2021  
  
  
  
                                        Description         Last Updated  
   
                                        Maternal history of epilepsy and recurre  
nt seizures 2021  
  
  
  
                                                    Last Documented On   
1 4:06PM ; [facility]  
  
  
  
                                        Fraternal history of psychiatric disorde  
rs 2021  
   
                                        Maternal history of hypertension   
021  
   
                                        Maternal history of oncologic disorder 0  
2021  
   
                                        Maternal history of psychiatric disorder  
s 2021  
   
                                        Paternal history of hypertension   
021  
   
                                        Paternal history of oncologic disorder 0  
2021  
  
  
  
                                        Description         Last Updated  
   
                                        Maternal history of epilepsy and recurre  
nt seizures 2021  
  
  
  
                                                    Last Documented On   
1 4:06PM ; [facility]  
  
  
  
                                        Fraternal history of psychiatric disorde  
rs 2021  
   
                                        Maternal history of hypertension   
021  
   
                                        Maternal history of oncologic disorder 0  
2021  
   
                                        Maternal history of psychiatric disorder  
s 2021  
   
                                        Paternal history of hypertension   
021  
   
                                        Paternal history of oncologic disorder 0  
2021  
  
  
  
                                        Description         Last Updated  
   
                                        Maternal history of epilepsy and recurre  
nt seizures 2021  
  
  
  
                                                    Last Documented On   
1 4:06PM ; [facility]  
  
  
  
                                        Fraternal history of psychiatric disorde  
rs 2021  
   
                                        Maternal history of hypertension   
021  
   
                                        Maternal history of oncologic disorder 0  
2021  
   
                                        Maternal history of psychiatric disorder  
s 2021  
   
                                        Paternal history of hypertension   
021  
   
                                        Paternal history of oncologic disorder 0  
2021  
  
  
  
                                        Description         Last Updated  
   
                                        Maternal history of epilepsy and recurre  
nt seizures 2021  
  
  
  
                                                    Last Documented On   
1 4:06PM ; [facility]  
  
  
  
                                        Fraternal history of psychiatric disorde  
rs 2021  
   
                                        Maternal history of hypertension   
021  
   
                                        Maternal history of oncologic disorder 0  
2021  
   
                                        Maternal history of psychiatric disorder  
s 2021  
   
                                        Paternal history of hypertension   
021  
   
                                        Paternal history of oncologic disorder 0  
2021  
  
  
  
                                        Description         Last Updated  
   
                                        Maternal history of epilepsy and recurre  
nt seizures 2021  
  
  
  
                                                    Last Documented On  4:06PM ; [facility]  
  
  
  
                                        Fraternal history of psychiatric disorde  
rs 2021  
   
                                        Maternal history of hypertension   
021  
   
                                        Maternal history of oncologic disorder 0  
2021  
   
                                        Maternal history of psychiatric disorder  
s 2021  
   
                                        Paternal history of hypertension   
021  
   
                                        Paternal history of oncologic disorder 0  
2021  
  
  
  
                                        Description         Last Updated  
   
                                        Maternal history of epilepsy and recurre  
nt seizures 2021  
  
  
  
                                                    Last Documented On   
1 4:06PM ; [facility]  
  
  
  
                                        Fraternal history of psychiatric disorde  
rs 2021  
   
                                        Maternal history of hypertension   
021  
   
                                        Maternal history of oncologic disorder 0  
2021  
   
                                        Maternal history of psychiatric disorder  
s 2021  
   
                                        Paternal history of hypertension   
021  
   
                                        Paternal history of oncologic disorder 0  
2021  
  
  
  
                                        Description         Last Updated  
   
                                        Maternal history of epilepsy and recurre  
nt seizures 2021  
  
  
  
                                                    Last Documented On   
1 4:06PM ; [facility]  
  
  
  
                                        Fraternal history of psychiatric disorde  
rs 2021  
   
                                        Maternal history of hypertension   
021  
   
                                        Maternal history of oncologic disorder 0  
2021  
   
                                        Maternal history of psychiatric disorder  
s 2021  
   
                                        Paternal history of hypertension   
021  
   
                                        Paternal history of oncologic disorder 0  
2021  
  
  
  
Advance Directives  
  
  
Includes: Current Advance Directives  
  
No Advance Directives Recorded  Documents on File  
  
                          Type         Date Recorded Patient Representative Expl  
anation  
   
                          ACP-Advance Directive                             
   
                          ACP-Power of                              
  
                                Documents on File  
  
                          Type         Date Recorded Patient Representative Expl  
anation  
   
                          ACP-Advance Directive                             
   
                          ACP-Power of                              
  
  
  
Physical Exam  
  
  
Physical Exam not supported for this document type  
  
No Physical Exam Recorded  
  
Physical Exam not supported for this document type  
  
No Physical Exam Recorded  
  
Physical Exam not supported for this document type  
  
No Physical Exam Recorded  
  
Physical Exam not supported for this document type  
  
No Physical Exam Recorded  
  
Physical Exam not supported for this document type  
  
No Physical Exam Recorded  
  
Physical Exam not supported for this document type  
  
No Physical Exam Recorded  
  
Physical Exam not supported for this document type  
  
No Physical Exam Recorded  
  
Physical Exam not supported for this document type  
  
No Physical Exam Recorded  
  
Physical Exam not supported for this document type  
  
No Physical Exam Recorded  
  
Physical Exam not supported for this document type  
  
No Physical Exam Recorded  
  
Physical Exam not supported for this document type  
  
No Physical Exam Recorded  
  
Physical Exam not supported for this document type  
  
No Physical Exam Recorded  
  
Physical Exam not supported for this document type  
  
No Physical Exam Recorded  
  
Physical Exam not supported for this document type  
  
No Physical Exam Recorded  
  
Physical Exam not supported for this document type  
  
No Physical Exam Recorded  
  
Physical Exam not supported for this document type  
  
No Physical Exam Recorded  
  
Physical Exam not supported for this document type  
  
No Physical Exam Recorded  
  
Physical Exam not supported for this document type  
  
No Physical Exam Recorded  
  
Physical Exam not supported for this document type  
  
No Physical Exam Recorded  
  
Physical Exam not supported for this document type  
  
No Physical Exam Recorded  
  
Physical Exam not supported for this document type  
  
No Physical Exam Recorded  
  
Physical Exam not supported for this document type  
  
No Physical Exam Recorded  
  
Physical Exam not supported for this document type  
  
No Physical Exam Recorded  
  
Physical Exam not supported for this document type  
  
No Physical Exam Recorded  
  
Physical Exam not supported for this document type  
  
No Physical Exam Recorded  
  
Physical Exam not supported for this document type  
  
No Physical Exam Recorded  
  
Physical Exam not supported for this document type  
  
No Physical Exam Recorded  
  
Physical Exam not supported for this document type  
  
No Physical Exam Recorded  
  
Physical Exam not supported for this document type  
  
No Physical Exam Recorded  
  
Reason for Referral  
  
  
                          Status       Reason       Specialty    Diagnoses /   
Procedures                              Referred By   
Contact                                 Referred To   
Contact  
   
                          Pending Review                             
  
  
Diagnoses  
  
  
Tachycardia  
  
  
  
Procedures  
  
  
Holter Monitor 48   
Hour                                      
  
  
Doreen Cabrera,   
APRN - CNP  
  
  
[address]  
  
  
[place]   
[address]  
  
  
Phone:   
[phone]  
  
  
Fax: [phone]                         
  
  
  
  
  
Additional Source Comments  
  
  
  
                                                    INFORMATION SOURCE (unrecogn  
ized section and content)  
   
                                          
  
  
  
                                        DATE CREATED        AUTHOR  
   
                                2018                      [facility]  
  
  
  
                                DATE CREATED    AUTHOR          AUTHOR'S ORGANIZ  
ATION  
   
                                2021                      [facility]  
  
  
  
                                DATE CREATED    AUTHOR          AUTHOR'S ORGANIZ  
ATION  
   
                                2021                      [facility]  
  
  
  
                                DATE CREATED    AUTHOR          AUTHOR'S ORGANIZ  
ATION  
   
                                2021                      [facility]  
  
  
  
                                DATE CREATED    AUTHOR          AUTHOR'S ORGANIZ  
ATION  
   
                                10/05/2021                      [facility]  
  
  
  
                                DATE CREATED    AUTHOR          AUTHOR'S ORGANIZ  
ATION  
   
                                10/25/2022                      [facility]  
  
  
  
                                DATE CREATED    AUTHOR          AUTHOR'S ORGANIZ  
ATION  
   
                                05/10/2024                      [facility]  
  
  
  
  
  
                                                    Reason for Visit (unrecogniz  
ed section and content)  
   
                                          
  
  
  
                                        Reason              Comments  
   
                                        Suicidal            with a plan of overd  
osing on zoloft  
  
  
  
                                        Reason              Comments  
   
                                        Mental Health Problem patient states she  
 is feeling homicidal and wants meds   
adjusted, doesn't feel they are working  
   
                                        Panic Attack          
  
  
  
                                        Reason              Comments  
   
                                        Homicidal           pt states she is on   
a  donw slope  of her bipolar, pt states  I want   
to   
kill anyone who pisses me off   
  
  
  
                          Status       Reason       Specialty    Diagnoses /   
Procedures                              Referred By   
Contact                                 Referred To   
Contact  
   
                          Pending Review                             
  
  
Diagnoses  
  
  
Tachycardia  
  
  
  
Procedures  
  
  
Holter Monitor 48   
Hour                                      
  
  
Doreen Cabrera,   
APRN - CNP  
  
  
[address] [place]   
[address]  
  
  
Phone:   
[phone]  
  
  
Fax: [phone]                         
  
  
  
  
  
                                    Scheduled  
  
                                                    Active and Recently Administ  
ered Medications (unrecognized section and content)  
   
                                          
  
  
  
                          Medication Order 2021  
   
                                                      
  
  
ALPRAZolam (XANAX) tablet 0.5 mg   
(COMPLETED)  
0.5 mg, Oral, ONCE, On Sat   
21 at 2100, For 1 dose  
                                                     (Given - Provider: Dottie Barcenas RN)  
                                          
  
                                       PRN  
  
                          Medication Order 2021  
   
                                                      
  
  
hydrOXYzine (VISTARIL) capsule   
50 mg  
50 mg, Oral, 3 TIMES DAILY PRN,   
Itching, Starting on e 21   
at [id number] (Given - Provid  
er: Kareem Montiel RN)  
  
  
  
  
  
  
                                                    Care Teams (unrecognized sec  
tion and content)  
   
                                          
  
  
  
                      Team Member Relationship Specialty  Start Date End Date  
   
                                                      
  
  
Doreen Cabrera, APRN - CNP  
  
  
[address] [place] [address]  
  
  
[phone] (Work)  
  
  
[phone] (Fax) PCP - General   Family Medicine 21           
  
  
FOR RECORDS PERTAINING TO PATIENTS WHO ARE OR HAVE BEEN ENROLLED IN A CHEMICAL 
DEPENDENCY/SUBSTANCEABUSE PROGRAM, SOME INFORMATION MAY BE OMITTED. This 
clinical summary was aggregated from multiple sources. Caution should be 
exercised in using it in the provision of clinical care. This summary normalizes
information from multiple sources, and as a consequence, information in this 
document may materially change the coding, format and clinical context of 
patient data. In addition, data may be omitted in some cases. CLINICAL DECISIONS
SHOULD BE BASED ON THE PRIMARY CLINICAL RECORDS. [facility]. provides 
no warranty or guarantee of the accuracy or completeness of information in this 
document.

## 2024-10-22 NOTE — ECG_ITS
[facility] 
                                        
                                       Test Date:    2024-10-22 
Pat Name:     SANTANA CORNELL         Department:    
Patient ID:   [id number]               Room:         - 
Gender:       Female                   Technician:    
:          2000               Requested By: [id number] 
Order Number: [id number]              Reading MD:   CELIA NEAL 
                                 Measurements 
Intervals                              Axis           
Rate:         90                       P:            42 
AZ:           132                      QRS:          67 
QRSD:         90                       T:            6 
QT:           346                                     
QTc:          394                                     
                           Interpretive Statements 
1100 Sinus rhythm 
4068 Nonspecific Twave abnormality 
9130 **  borderline ECG  ** 
Compared to ECG 10/17/2024 14:43:32 
Sinus tachycardia no longer present 
Electronically Signed On 10- 6:50:00 EDT by CELIA NEAL

## 2024-10-22 NOTE — XMS_ITS
Comprehensive CCD (C-CDA v2.1)  
  
                          Created on: 2024  
  
  
ANETA LINNETTE SIMONE NICHOLS~ANETA  
External Reference #: CDR,PersonID:[id number]  
: 2000  
Sex: Female  
  
Demographics  
  
  
                                        Address             [address] [place] [address]  [address]  
   
                                        Home Phone          [phone]  
   
                                        Home Phone          [phone]  
   
                                        Home Phone          [phone]  
   
                                        Home Phone          [phone]  
   
                                        Preferred Language  en  
   
                                        Marital Status      Single  
   
                                        Amish Affiliation Unknown  
   
                                        Race                Unknown  
   
                                        Ethnic Group        Not  or Lati  
no  
  
  
Author  
  
  
                                        Organization        [facility]  
  
  
Care Team Providers  
  
  
                                Care Team Member Name Role            Phone  
   
                                STANLEY NERI MD Unavailable     Unavailab  
le  
   
                                STANLEY NERI MD Unavailable     Unavailab  
nilda  
   
                                NONE            Unavailable     Unavailable  
   
                                ANYA OLIVARES Attending       Unavailable  
   
                                ANYA OLIVARES Consulting      Unavailable  
   
                                ANYA OLIVARES Admitting       Unavailable  
   
                                Deborah DIAS, Doreen Primary Care Provider [phone]  
   
                                Deborah APRN - LARISSA, Doreen M Primary Care Provider  
 [phone]  
   
                                MOLLY SALEH C Admitting       Unavailable  
   
                                MOLLY SALEH C Attending       Unavailable  
   
                                ISMAEL ORTEGA Referring       Unavailable  
   
                                DEBORAH, DOREEN M Primary Care    Unavailable  
   
                                MALIKA SHEPHERD Attending       Unavailable  
   
                                DEBORAH, DOREEN M Primary Care    Unavailable  
   
                                ISMAEL ORTEGA Attending       Unavailable  
   
                                DEBORAH, DOREEN M Primary Care    Unavailable  
   
                                RAFAEL GALLAGHER   Attending       Unavailable  
   
                                DEBORAH, DOREEN M Primary Care    Unavailable  
   
                                DEBORAH, DOREEN M Referring       Unavailable  
   
                                DEBORAH, DOREEN M Primary Care    Unavailable  
   
                                Deborah APRN - LARISSA, Doreen M Primary Care Provider  
 [phone]  
   
                                DEBROAH, DOREEN M Referring       Unavailable  
   
                                DEBORAH, DOREEN M Primary Care    Unavailable  
   
                                Deborah LARISSA, Doreen Primary Care Provider [phone]  
   
                                DEBORAH, DOREEN M Primary Care    Unavailable  
   
                                ABRAHAM MARIE Attending       Unavailable  
   
                                ABRAHAM MARIE Attending       Unavailable  
   
                                ABRAHAM MARIE Referring       Unavailable  
   
                                DEBORAH, DOREEN M Primary Care    Unavailable  
   
                                Deborah LARISSA, Doreen Unavailable     [phone]  
   
                                SYEDA PATTERSON    Attending       Unavailable  
  
  
  
Allergies  
  
  
                                                    Allergy   
Classification                          Reported   
Allergen(s)               Allergy Type              Date of   
Onset                     Reaction(s)               Facility  
   
                                                    Amoxicillin /   
Clavulanate  
(6 sources)                             Amoxicillin /   
Clavulanate;   
Translations:   
[Augmentin   
500-125 MG Oral   
Tablet]                   Drug Allergy                
1                         [facility]  
   
                                                    ARIPiprazole  
(6 sources)                             ARIPiprazole;   
Translations:   
[Abilify]                 Drug Allergy                
1                         [facility]                    [facility]  
   
                                                    metFORMIN  
(6 sources)                             metFORMIN;   
Translations:   
[metformin]               Drug Allergy                
1                                       Nausea,   
Vomiting                                [facility]   
[facility] [place] [facility]  
   
                                                    Serotonin Reuptake   
Inhibitors (SSRIs)  
(7 sources)                             Sertraline;   
Translations:   
[trazodone   
hydrochloride]            Drug Allergy                
1                                       Panic attack,   
slow/groggy                             [facility]   
[facility] [place] [facility]""  
   
                                                    Unclassified  
(6 sources)                             Amoxicillin-Pot   
Clavulanate;   
Translations:   
[AMOXICILLIN-PO  
T CLAVULANATE]                          Propensity to   
adverse   
reactions to   
drug                                      
7                                                   [facility]  
   
                                                      
(1 source)                              Amoxicillin /   
Clavulanate               Drug Allergy                
4                                                   [facility]   
[facility]  
   
                                                      
(20 sources)                            Amoxicillin /   
Clavulanate;   
Translations:   
[Augmentin   
500-125 MG Oral   
Tablet]                   Drug Allergy                
1                         [facility]  
   
                                                      
(20 sources)                            ARIPiprazole;   
Translations:   
[Abilify]                 Drug Allergy                
1                         [facility]  
   
                                                      
(20 sources)                            metFORMIN;   
Translations:   
[METFORMIN]               Drug Allergy                
0                                       Nausea,   
Vomiting                                [facility]   
[facility] [place] [facility]""  
   
                                                      
(15 sources)        Sertraline          Drug Allergy          
1                         slow/ [facility]              [facility]  
   
                                                      
(4 sources)         traZODone           Drug Allergy        10-  
2                         Panic attack              [facility]  
   
                                                      
(1 source)                              nickel;   
Translations:   
[NICKEL]                  Drug Allergy                
7                                                   [facility]   
Repository  
   
                                                      
(1 source)                              CARBONIC   
ANHYDRASE   
INHIBITORS;   
Translations:   
[CARBONIC   
ANHYDRASE   
INHIBITORS]                             Propensity to   
adverse   
reactions to   
drug   
(disorder)                                
7                                                   [facility]   
Repository  
   
                                                      
(8 sources)         Sertraline          Drug Allergy          
4                         slow/groggy               [facility]  
   
                                                      
(8 sources)         traZODone           Drug Allergy          
4                         Panic attack              [facility]  
  
  
  
Medications  
Current Medications  
  
  
  
                      Medication Drug Class(es) Dates      Sig (Normalized) Sig   
(Original)  
   
                                                    Alcohol Pads 70%  
(7 sources)                                         Start:   
2024                                          Alcohol Pads 70%   
2024   
Provider: Doreen Cabrera CNP  
   
                                                    ARIPiprazole 5 mg   
oral tablet  
(20 sources)                            Atypical   
Antipsychotic                           Start:   
10-  
End:   
2024                                          ARIPiprazole 5 MG   
Oral Tablet   
2024   
Provider: Doreen Cabrera CNP  
   
                                                    atorvastatin 20 mg   
oral tablet  
(7 sources)                             HMG-CoA Reductase   
Inhibitor                               Start:   
2024                                          Atorvastatin   
Calcium 20 MG Oral   
Tablet 2024   
Provider: Doreen Cabrera CNP  
   
                                                    Blood Glucose System   
Sriram Kit  
(7 sources)                                         Start:   
2024                                          Blood Glucose   
System Sriram Kit   
2024   
Provider: Doreen Cabrera CNP  
   
                                                    busPIRone   
hydrochloride 10 mg   
oral tablet  
(20 sources)                                        Start:   
2024                                          busPIRone HCl 10   
MG Oral Tablet   
2024   
Provider: Doreen Cabrera CNP  
  
  
  
                                                    Start: 2023  
End: 2024                                     busPIRone HCl 5 MG Oral Tabl  
et 2024 - 2024   
Provider:   
Doreen Cabrera CNP  
  
  
  
                                                    docusate sodium 100   
mg oral capsule  
(5 sources)                     Start: 2024                 Colace 100 MG   
Oral   
Capsule 2024   
Provider: Doreen Cabrera CNP  
   
                                                    24 hr guanFACINE 1 mg   
extended release oral   
tablet  
(17 sources)                            Central alpha-2   
Adrenergic Agonist                      Start: 2024  
End: 2024                                     Intuniv 1 MG Oral   
Tablet Extended   
Release 24 Hour   
2024 Provider:   
Doreen Cabrera CNP  
   
                                                    hydrOXYzine pamoate   
50 mg oral capsule  
(2 sources)     Antihistamine   Start: 2021                 hydrOXYzine   
(VISTARIL) capsule 50   
mg  
  
  
  
                                                take 1 tablet by mouth once verito  
y hydrOXYzine (ATARAX) 10 MG tablet Take 10 mg   
by   
mouth nightly 0 Active  
  
  
  
                                                    ibuprofen 400 mg   
oral tablet  
(2 sources)                             Nonsteroidal   
Anti-inflammatory Drug                  Start:   
2021                              take 1 tablet   
by mouth   
every six   
hours as   
needed for   
pain                                    ibuprofen   
(ADVIL;MOTRIN) 400   
MG tablet Take 1   
tablet by mouth   
every 6 hours as   
needed for Pain   
120 tablet 0   
2021 Active  
   
                                                    Iron  
(5 sources)                                         Start:   
2024                                          [employer] Iron 325 (65   
Fe) MG Oral Tablet   
2024   
Provider: Doreen Cabrera CNP  
   
                                                    lisinopril 5 mg   
oral tablet  
(7 sources)                             Angiotensin Converting   
Enzyme Inhibitor                        Start:   
2024                                          Lisinopril 5 MG   
Oral Tablet   
2024   
Provider: Doreen Cabrera CNP  
   
                                                    LORazepam 0.5 mg   
oral tablet  
(3 sources)         Benzodiazepine                          take 0.5 mg   
by mouth once   
daily as   
needed                                  LORazepam (ATIVAN   
PO) Take 0.5 mg by   
mouth daily as   
needed 0 Active  
   
                                                    meclizine   
hydrochloride 25   
mg oral tablet  
(3 sources)               Antiemetic                Start:   
2017                              take 1 tablet   
by mouth   
three times   
daily as   
needed for   
dizziness                               meclizine   
(ANTIVERT) 25 MG   
tablet Take 1   
tablet by mouth 3   
times daily as   
needed for   
Dizziness 30   
tablet 0   
2017 Active  
   
                                                    naloxone   
hydrochloride 40   
mg/ml nasal spray  
(20 sources)              Opioid Antagonist         Start:   
2021  
End:   
2021                                          Narcan 4 MG/0.1ML   
Nasal Liquid   
2021   
Provider: Doreen Cabrera CNP  
   
                                                    Naproxen  
(3 sources)                             Nonsteroidal   
Anti-inflammatory Drug                                         Naproxen Sodium   
(ALEVE PO) Take 1   
tablet by mouth as   
needed 0 Active  
   
                                                    polyethylene   
glycol 3350 50308   
mg powder for oral   
solution  
(20 sources)              Osmotic Laxative          Start:   
2021                                          MiraLax 17   
GM/SCOOP Oral   
Powder 2021   
Provider: Doreen Cabrera CNP  
   
                                                    QUEtiapine 50 mg   
oral tablet  
(5 sources)               Atypical Antipsychotic    Start:   
2024                                          SEROquel 50 MG   
Oral Tablet   
2024   
Provider: Doreen Cabrera CNP  
   
                                                    SITagliptin 50 mg   
oral tablet  
(10 sources)                            Dipeptidyl Peptidase 4   
Inhibitor                               Start:   
2024                                          Januvia 50 MG Oral   
Tablet 2024   
Provider: Doreen Cabrera CNP  
  
  
  
                                                take 500 mg by mouth once daily   
SITagliptin Phosphate (JANUVIA PO) Take 500 mg   
by   
mouth daily 0 Active  
  
  
  
                                                    topiramate 25 mg oral   
tablet  
(3 sources)                                                 take 25 mg by mouth   
twice daily                             Topiramate (TOPAMAX PO)   
Take 25 mg by mouth 2   
times daily 0 Active  
   
                                                    {24 (drospirenone 4 MG   
Oral Tablet) / 4 (inert   
ingredients 1 MG Oral   
Tablet) } Pack [Slynd]  
(7 sources)                                         Start:   
2024                                          Slynd 4 MG Oral Tablet   
2024 Provider:   
Doreen Cabrera CNP  
  
  
  
Completed/Discontinued Medications  
  
  
  
                      Medication Drug Class(es) Dates      Sig (Normalized) Sig   
(Original)  
   
                                                    ALPRAZolam 0.5 mg oral   
tablet  
(1 source)                Benzodiazepine            Start:   
2021  
End:   
2021                                          ALPRAZolam   
(XANAX) tablet   
0.5 mg  
   
                                                    azithromycin 250 mg oral   
tablet  
(20 sources)                            Macrolide   
Antimicrobial                           Start:   
2021  
End:   
2021                                          Azithromycin 250   
MG Oral Tablet   
2021 -   
2021   
Provider:  
   
                                                    brompheniramine maleate   
0.4 mg/ml /   
dextromethorphan   
hydrobromide 2 mg/ml /   
pseudoephedrine   
hydrochloride 6 mg/ml   
oral solution  
(20 sources)                            alpha-Adrenergic   
Agonist,   
Uncompetitive   
N-methyl-D-aspartat  
e Receptor   
Antagonist, Sigma-1   
Agonist                                 Start:   
2021  
End:   
2021                                          Pseudoeph-Bromph  
en-DM 30-2-10   
MG/5ML Oral   
Syrup 2021   
- 2021   
Provider:  
   
                                                    escitalopram 5 mg oral   
tablet  
(20 sources)                            Serotonin Reuptake   
Inhibitor                               Start:   
2021  
End:   
10-                                          Lexapro 5 MG   
Oral Tablet   
2021 -   
2021   
Provider:  
   
                                                    hydroCHLOROthiazide 25   
mg / spironolactone 25   
mg oral tablet  
(20 sources)                            Thiazide Diuretic,   
Aldosterone   
Antagonist                              Start:   
2023  
End:   
2023                                          Spironolactone-H  
CTZ 25-25 MG   
Oral Tablet   
2023 -   
2023   
Provider: Doreen Cabrera CNP  
  
  
  
                                                    Start: 2021  
End: 2023                                     Aldactazide 50-50 MG Oral Ta  
blet 06/10/2021 - 2021   
Provider: Doreen Cabrera CNP  
  
  
  
                                                    hydrocortisone 10   
mg/ml / neomycin 3.5   
mg/ml / polymyxin b   
63119 unt/ml otic   
suspension  
(20 sources)                            Aminoglycoside   
Antibacterial,   
Polymyxin-class   
Antibacterial,   
Corticosteroid                          Start:   
2021  
End: 2021                                     Neomycin-Polymyxin-HC   
3.5-11219-1 Otic   
Suspension 2021 -   
2021 Provider: Doreen Cabrera CNP  
   
                                                    ketorolac   
tromethamine 10 mg   
oral tablet  
(20 sources)                            Nonsteroidal   
Anti-inflammatory   
Drug, Cyclooxygenase   
Inhibitor                               Start:   
2022  
End: 2022                                     Ketorolac Tromethamine 10   
MG Oral Tablet 2022   
- 2022 Provider:   
Julieth Pisano CNP  
   
                                                    lamoTRIgine 100 mg   
oral tablet  
(20 sources)                            Mood Stabilizer,   
Anti-epileptic Agent                    Start:   
2024  
End: 2024                                     lamoTRIgine 100 MG Oral   
Tablet 2024 -   
2024 Provider: Doreen Cabrera CNP  
   
                                                    metFORMIN   
hydrochloride 500 mg   
oral tablet  
(20 sources)              Biguanide                 Start:   
07-  
End: 2024                                     metFORMIN HCl 500 MG Oral   
Tablet 07/15/2023 -   
2024 Provider:  
  
  
  
                                                    Start: 2021  
End: 10-                         take 1 tablet by mouth every   
twenty-four hours                       metFORMIN HCl  MG Oral   
Tablet Extended Release 24 Hour   
2021 - 10/21/2022 Provider:   
Doreen Cabrera CNP  
  
  
  
                                                    methylPREDNISolone 4 mg   
oral tablet  
(20 sources)              Corticosteroid            Start:   
2021  
End: 2021                                     methylPREDNISolone 4 MG   
Oral Tablet Therapy   
Pack 2021 -   
2021 Provider:  
   
                                                    prazosin 1 mg oral   
capsule  
(20 sources)                            alpha-Adrenergic   
Blocker                                 Start:   
2023  
End: 2024                                     Prazosin HCl 1 MG Oral   
Capsule 2024 -   
2024 Provider:   
Doreen Cabrera CNP  
  
  
  
                                                    Start: 2022  
End: 2023                                     Prazosin HCl 1 MG Oral Capsu  
le   
2022 - 2023 Provider:   
Doreen Cabrera CNP  
   
                                        Start: 2021   take 3 capsules by m  
outh once   
daily                                   prazosin (MINIPRESS) 1 MG capsule   
Take 3 capsules by mouth nightly   
90 capsule 0 2021 Active  
   
                                                    Start: 2021  
End: 10-                                     Prazosin HCl 1 MG Oral Capsu  
le   
06/10/2021 - 2021 Provider:   
Doreen Cabrera CNP  
  
  
  
                                                    promethazine   
hydrochloride 12.5 mg   
oral tablet  
(20 sources)              Phenothiazine             Start: 2022  
End: 2023                                     Promethazine HCl 12.5   
MG Oral Tablet   
2022 -   
2023 Provider:   
Julieth Pisano CNP  
   
                                                    sertraline 50 mg oral   
tablet  
(20 sources)                            Serotonin Reuptake   
Inhibitor                               Start: 2024  
End: 2024                                     Sertraline HCl 50 MG   
Oral Tablet   
2024 -   
2024 Provider:   
Doreen Cabrera CNP  
  
  
  
                                                    Start: 2021  
End: 2021                                     Zoloft 50 MG Oral Tablet 2021 - 2021 Provider:  
  
  
  
                                                    traZODone hydrochloride   
100 mg oral tablet  
(20 sources)                            Serotonin Reuptake   
Inhibitor                               Start: 2023  
End: 2024                                     traZODone HCl 100 MG   
Oral Tablet   
2024 -   
2024 Provider:   
Doreen Cabrera CNP  
  
  
  
                                                    Start: 2021  
End: 10-                                     traZODone HCl 50 MG Oral Tab  
let 2021 - 2021   
Provider:  
  
  
  
                                                    ziprasidone 80 mg oral   
capsule  
(20 sources)              Atypical Antipsychotic    Start: 2021  
End: 10-                                     Geodon 80 MG Oral   
Capsule 2021 -   
2021 Provider:  
  
  
  
                                                    Start: 2021  
End: 2021                                     Geodon 20 MG Oral Capsule   
2021 - 2021 Provider:  
   
                                                            take 4 capsules by m  
outh once   
daily                                   ziprasidone (GEODON) 20 MG capsule   
Take 80 mg by mouth nightly 0   
Active  
  
  
  
Problems  
Active Problems  
  
  
                                                    Problem   
Classification  Problem         Date            Documented Date Episodic/Chronic  
   
                                                    Anxiety disorders  
(20 sources)                            Posttraumatic stress   
disorder;   
Translations:   
[Post-traumatic stress   
disorder, unspecified]                  Onset:   
2021                                          Chronic  
   
                                                    Attention-deficit,   
conduct, and   
disruptive behavior   
disorders  
(20 sources)                            Attention deficit   
hyperactivity   
disorder;   
Translations:   
[Attention-deficit   
hyperactivity   
disorder, unspecified   
type]                                   Onset:   
2024                Chronic  
   
                                                    Attention-deficit,   
conduct, and   
disruptive behavior   
disorders  
(12 sources)                            Undifferentiated   
attention deficit   
disorder;   
Translations:   
[Attention deficit   
disorder with   
hyperactivity]                          Onset:   
2024                                          Chronic  
   
                                                    Deficiency and other   
anemia  
(3 sources)                             Iron deficiency   
anemia; Translations:   
[Iron deficiency   
anemia, unspecified]                    Onset:   
10-                                          Episodic  
   
                                                    Diabetes mellitus   
without complication  
(19 sources)                            Type 2 diabetes   
mellitus without   
complication;   
Translations: [Type 2   
diabetes mellitus   
without complications]                  Onset:   
2024                Chronic  
   
                                                    Disorders of teeth   
and jaw  
(5 sources)                             Dental caries;   
Translations: [Dental   
caries, unspecified]                    Onset:   
2024                                          Episodic  
   
                                                    Essential   
hypertension  
(16 sources)                            Hypertensive disorder;   
Translations:   
[Unspecified essential   
hypertension]                           Onset:   
2021                                          Chronic  
   
                                                    Immunizations and   
screening for   
infectious disease  
(20 sources)                            Contact with and   
(suspected) exposure   
to other viral   
communicable diseases;   
Translations: [HIV   
screening]                              Onset:   
08-                                          Episodic  
   
                                                    Impulse control   
disorders, NEC  
(1 source)                              Homicidal thoughts;   
Translations:   
[Homicidal ideations]                                         Episodic  
   
                                                    Menstrual disorders  
(14 sources)                            Dysmenorrhea;   
Translations:   
[Dysmenorrhea,   
unspecified]                            Onset:   
2024                Chronic  
   
                                                    Mood disorders  
(20 sources)                            Depressive disorder;   
Translations: [Major   
depressive disorder,   
single episode,   
unspecified]                            Onset:   
2021                                          Chronic  
   
                                                    Nonspecific chest   
pain  
(3 sources)                             Other chest pain;   
Translations: [Chest   
pain]                                   Onset:   
2024                                          Episodic  
   
                                                    Other ear and sense   
organ disorders  
(7 sources)                             Otitis externa;   
Translations:   
[Infective otitis   
externa, unspecified]                   Onset:   
2021                                          Chronic  
   
                                                    Other lower   
respiratory disease  
(6 sources)                             Cough; Translations:   
[Cough]                                 Onset:   
2022                                          Episodic  
   
                                                    Other nutritional;   
endocrine; and   
metabolic disorders  
(20 sources)                            Finding of body mass   
index; Translations:   
[Body mass index   
(observable entity)]                    Onset:   
2021                                          Chronic  
   
                                                    Other nutritional;   
endocrine; and   
metabolic disorders  
(20 sources)                            Morbid obesity;   
Translations: [Morbid   
obesity]                                Onset:   
2021                                          Chronic  
   
                                                    Personality   
disorders  
(20 sources)                            Borderline personality   
disorder;   
Translations:   
[Borderline   
personality disorder]                   Onset:   
2021  
Resolved:   
2024                                          Chronic  
   
                                                    Substance-related   
disorders  
(20 sources)                            Tobacco dependence   
syndrome;   
Translations:   
[Nicotine dependence,   
unspecified,   
uncomplicated]                          Onset:   
2021  
Resolved:   
2023                                          Chronic  
  
  
Past or Other Problems  
  
  
                      Problem Classification Problem    Date       Documented Da  
te Episodic/Chronic  
   
                                                    Cardiac dysrhythmias  
(10 sources)                            Tachycardia;   
Translations:   
[Tachycardia,   
unspecified]                            Onset:   
2021                                          Episodic  
   
                                                    Conditions associated   
with dizziness or   
vertigo  
(5 sources)                             Dizziness;   
Translations:   
[Dizziness and   
giddiness]                              Onset:   
2017                Episodic  
   
                                                    Deficiency and other   
anemia  
(9 sources)                             Anemia;   
Translations:   
[Anemia,   
unspecified]                            Onset:   
2024                                          Episodic  
   
                                                    Other screening for   
suspected conditions   
(not mental disorders   
or infectious disease)  
(20 sources)                            Encounter for   
screening for   
diabetes mellitus;   
Translations:   
[Diabetes Risk Test   
Score]                                  Onset:   
2021                                          Episodic  
   
                                                    Otitis media and   
related conditions  
(5 sources)                             Acute suppurative   
otitis media   
without spontaneous   
rupture of ear   
drum; Translations:   
[Acute suppurative   
otitis media   
without spontaneous   
rupture of ear   
drum, right ear]                        Onset:   
2017                Episodic  
  
  
  
Results  
  
  
                          Test Name    Value        Interpretation Reference   
Range                                   Facility  
   
                                                    Laboratory - Chemistry and C  
hemistry - challengeon 2024   
   
                      Ferritin [Mass/Vol] 118 ng/mL             ( )  [facility]  
[facility]  
   
                      Free T3 [Mass/Vol] 3.2 pg/mL             (2.0-4.4 ) [facility]  
   
                          Free T4 [Mass/Vol] 1.13 ng/dL                (0.82-1.7  
7   
)                                       [facility]  
   
                      Iron [Mass/Vol] 52 ug/dL              ( )  [facility]  
   
                                                    Iron binding capacity   
[Mass/Vol]      396 ug/dL                       (250-450 )      [facility]  
   
                                                    Iron binding   
capacity.unsaturated   
[Mass/Vol]      344 ug/dL                       (131-425 )      [facility]  
   
                                                    Iron saturation [Mass   
fraction]       13 %            Low             (15-55 )        [facility]  
   
                          TSH Qn       2.930 uIU/mL              (0.450-4.50  
0 )                                     [facility]  
   
                                                    Laboratory - Hematology and   
Cell countson 2024   
   
                      Basophils (Bld) [#/Vol] 0.1 10*3/uL            (0.0-0.2 )   
[facility]  
   
                          Basophils/100 WBC (Bld) 0 %                       (Not  
 Estab.   
)                                       [facility]  
   
                                                    Eosinophils (Bld)   
[#/Vol]         0.2 10*3/uL                     (0.0-0.4 )      [facility]  
   
                                                    Eosinophils/100 WBC   
(Bld)               1 %                                     (Not Estab.   
)                                       [facility]  
   
                                                    Erythrocyte   
distribution width   
(RBC) [Ratio]       15.7 %              High                (11.7-15.4   
)                                       [facility]   
[facility]   
[facility] [place] [facility]""  
   
                                                    Hematocrit (Bld)   
[Volume fraction]   41.3 %                                  (34.0-46.6   
)                                       [facility]   
[facility]   
\[facility] [place] [facility]\""  
   
                                                    Hemoglobin (Bld)   
[Mass/Vol]          12.9 g/dL                               (11.1-15.9   
)                                       [facility]   
\[facility] [place] [facility]\""  
   
                                                    Immature granulocytes   
(Bld) [#/Vol]   0.1 10*3/uL                     (0.0-0.1 )      [facility]   
\[facility] [place] [facility]\""  
   
                                                    Immature   
granulocytes/100 WBC   
(Bld)               1 %                                     (Not Estab.   
)                                       [facility]   
[facility]  
   
                                                    Lymphocytes (Bld)   
[#/Vol]         2.4 10*3/uL                     (0.7-3.1 )      [facility]   
[facility]   
\[facility] [place] [facility]\""  
   
                                                    Lymphocytes/100 WBC   
(Bld)               18 %                                    (Not Estab.   
)                                       [facility]   
[facility]   
\[facility] [place] [facility]\""  
   
                                                    MCH (RBC) [Entitic   
mass]               24.3 pg             Low                 (26.6-33.0   
)                                       [facility]   
[facility]   
\[facility] [place] [facility]\""  
   
                          MCHC (RBC) [Mass/Vol] 31.2 g/dL    Low          (31.5-  
35.7   
)                                       [facility]   
[facility]   
\[facility] [place] [facility]\""  
   
                      MCV (RBC) [Entitic vol] 78 fL      Low        (79-97 )   [facility]  
[facility]   
[facility]   
\[facility] [place] [facility]\""  
   
                      Monocytes (Bld) [#/Vol] 0.6 10*3/uL            (0.1-0.9 )   
[facility]   
[facility]   
\[facility] [place] [facility]\""  
   
                          Monocytes/100 WBC (Bld) 5 %                       (Not  
 Estab.   
)                                       [facility]   
[facility]   
\[facility] [place] [facility]\""  
   
                                                    Morphology Gagandeep (Bld)   
[Interp]        NP                                              [facility]   
[facility]   
\[facility] [place] [facility]\""  
   
                                                    Neutrophils (Bld)   
[#/Vol]         9.7 10*3/uL     High            (1.4-7.0 )      [facility]   
[facility]   
\[facility] [place] [facility]""  
   
                                                    Neutrophils/100 WBC   
(Bld)               75 %                                    (Not Estab.   
)                                       [facility]   
[facility]   
\[facility] [place] [facility]\""  
   
                                                    Nucleated RBC/100 WBC   
(Bld) [Ratio]   NP                                              [facility]   
[facility]   
\[facility] [place] [facility]\""  
   
                      Platelets (Bld) [#/Vol] 308 10*3/uL            (150-450 )   
[facility]   
[facility]   
\[facility] [place] [facility]\""  
   
                          RBC (Bld) [#/Vol] 5.31 10*6/uL High         (3.77-5.28  
   
)                                       [facility]  
   
                      WBC (Bld) [#/Vol] 13.1 10*3/uL High       (3.4-10.8 ) [facility]  
[facility]   
[facility]   
[facility]   
[place]  
   
                                                    Laboratory - Serology - non-  
microon 2024   
   
                      Thyroglobulin Ab Qn [IU]/mL               (0.0-0.9 ) [facility]  
   
                                        Comment on above:   Note: Thyroglobulin   
Antibody measured by J[facility]   
[employer] .It should be noted that the presence of   
thyroglobulinantibodies may not be pathogenic nor diagnostic,   
especiallyat very low levels. The assay  has found   
thatfour percent of individuals without evidence of   
thyroiddisease or autoimmunity will have positive TgAb levels   
upto 4 IU/mL.   
   
                      TPO Ab Qn  14 [IU]/mL            (0-34 )    [facility]  
   
                                                    No Panel Informationon    
   
                      Immature Cells NP                               [facility]  
   
                      Reported Physicians See Note                         [facility]  
   
                                        Comment on above:   Note: Reported Physi  
cians:Ordering: Doreen Cabrera   
   
                                                    Troponin I.cardiac High sens  
itivity method [Mass/Vol]on 2024   
   
                                                    1 HOUR TROP I, HIGH   
SENSITIVITY     <2              Normal          <16             [facility]  
   
                                        Comment on above:   Performed By: #### 8  
9579-7 ####  
[facility] ([id number])  
[address] [place] [address]   
   
                                                    BASIC METABOLIC PANLon    
   
                      Anion gap [Moles/Vol] 7 mmol/L   Normal     5-15       [facility]  
   
                                        Comment on above:   Performed By: #### C  
BCA, BMP, 04890-9, 65797-2, 68472-7 ####  
[facility] ([id number])  
[address] [place] [address]   
   
                      Calcium [Mass/Vol] 8.9 mg/dL  Normal     8.5-10.5   [facility]  
   
                                        Comment on above:   Performed By: #### C  
BCA, BMP, 84070-1, 19117-3, 25731-8 ####  
[facility] ([id number])  
[address] [place] [address]   
   
                      Chloride [Moles/Vol] 101 mmol/L Normal          [facility]  
   
                                        Comment on above:   Performed By: #### C  
BCA, BMP, 79694-6, 96473-8, 53519-8 ####  
[facility] ([id number])  
[address] [place] [address]   
   
                      CO2 [Moles/Vol] 28 mmol/L  Normal     22-32      [facility]  
   
                                        Comment on above:   Performed By: #### C  
BCA, BMP, 99832-8, 26776-0, 45186-1 ####  
[facility] ([id number])  
[address] [place] [address]   
   
                      Creatinine [Mass/Vol] 0.91 mg/dL Normal     0.40-1.00  [facility]  
   
                                        Comment on above:   Result Comment: METH  
OD TRACEABLE TO IDMS STANDARD   
   
                                                            Performed By: #### C  
BCA, BMP, 23089-8, 97048-4, 27949-6 ####  
[facility] ([id number])  
[address] [place] [address]   
   
                                                    eGFR (CKD-EPI) NON-RACE   
DEPENDENT       >90             Normal          >59             [facility]  
   
                                        Comment on above:   Result Comment:  
Reported eGFR is based on the  
CKD-EPI  equation that does  
not use a race coefficient.   
   
                                                            Performed By: #### C  
BCA, BMP, 08184-8, 03758-8, 13640-1 ####  
[facility] ([id number])  
[address] [place] [address]   
   
                      Glucose [Mass/Vol] 107 mg/dL  High       65-99      [facility]  
   
                                        Comment on above:   Performed By: #### C  
BCA, BMP, 84116-1, 21375-9, 97868-2 ####  
[facility] ([id number])  
[address] [place] [address]   
   
                      Potassium [Moles/Vol] 3.5 mmol/L Normal     3.5-5.0    [facility]  
   
                                        Comment on above:   Performed By: #### C  
BCA, BMP, 52425-4, 18158-5, 91103-5 ####  
[facility] ([id number])  
[address] [place] [address]   
   
                      Sodium [Moles/Vol] 136 mmol/L Normal     134-146    [facility]  
   
                                        Comment on above:   Performed By: #### C  
REBECCA, BMP, , 54602-4, 28366-5 ####  
[facility] ([id number])  
[address] [place] [address]   
   
                                                    Urea nitrogen   
[Mass/Vol]      10 mg/dL        Normal          5-23            [facility]  
   
                                        Comment on above:   Performed By: #### C  
REBECCA, BMP, , 11589-9, 18083-3 ####  
[facility] ([id number])  
[address] [place] [address]   
   
                                                    CBC AND AUTO DIFFon 20  
24   
   
                      ABSOLUTE BASOPHIL 0.2 X10E9/L Normal     0.0-0.2    [facility]  
   
                                        Comment on above:   Performed By: #### C  
REBECCA, BMP, , 33894-7, 44591-2 ####  
[facility] ([id number])  
[address] [place] [address]   
   
                      ABSOLUTE NEUTROPHIL 10.7 X10E9/L High       1.5-6.6    [facility]  
   
                                        Comment on above:   Performed By: #### C  
REBECCA, BMP, , 53335-7, 59482-7 ####  
[facility] ([id number])  
[address] [place] [address]   
   
                      Basophils/100 WBC (Bld) 1.1 %      Normal                [facility]  
   
                                        Comment on above:   Performed By: #### C  
REBECCA, BMP, , 20142-4, 26775-2 ####  
[facility] ([id number])  
[address] [place] [address]   
   
                                                    Eosinophils (Bld)   
[#/Vol]         0.2 10*3/uL     Normal          0.0-0.4         [facility]  
   
                                        Comment on above:   Performed By: #### C  
BCA, BMP, 48997-7, 67880-8, 18355-1 ####  
[facility] ([id number])  
[address] [place] [address]   
   
                                                    Eosinophils/100 WBC   
(Bld)           1.3 %           Normal                          [facility]  
   
                                        Comment on above:   Performed By: #### C  
BCA, BMP, , 55118-9, 44411-5 ####  
[facility] ([id number])  
[address] [place] [address]   
   
                                                    Erythrocyte   
distribution width   
(RBC) [Ratio]   19.4 %          High            11.5-15.0       [facility]  
   
                                        Comment on above:   Performed By: #### C  
BCA, BMP, , 58485-2, 12036-5 ####  
[facility] ([id number])  
[address] [place] [address]   
   
                                                    Hematocrit (Bld)   
[Volume fraction] 33.5 %          Low             35-47           [facility]  
   
                                        Comment on above:   Performed By: #### C  
BCA, BMP, , 40800-7, 96952-5 ####  
[facility] ([id number])  
[address] [place] [address]   
   
                                                    Hemoglobin (Bld)   
[Mass/Vol]      10.6 g/dL       Low             11.7-15.5       [facility]  
   
                                        Comment on above:   Performed By: #### C  
BCA, BMP, , 52858-8, 76445-1 ####  
[facility] ([id number])  
[address] [place] [address]   
   
                                                    Lymphocytes (Bld)   
[#/Vol]         2.4 10*3/uL     Normal          1.0-3.5         [facility]  
   
                                        Comment on above:   Performed By: #### C  
BCA, BMP, , 22600-9, 62669-9 ####  
[facility] ([id number])  
[address] [place] [address]   
   
                                                    Lymphocytes/100 WBC   
(Bld)           17.2 %          Normal                          [facility]  
   
                                        Comment on above:   Performed By: #### C  
BCA, BMP, 63216-9, 11987-7, 92784-1 ####  
[facility] ([id number])  
[address] [place] [address]   
   
                                                    MCH (RBC) [Entitic   
mass]           21.7 pg         Low             27-34           [facility]  
   
                                        Comment on above:   Performed By: #### C  
REBECCA, BMP, 57526-3, 48131-3, 53251-5 ####  
[facility] ([id number])  
[address] [place] [address]   
   
                      MCHC (RBC) [Mass/Vol] 31.6 g/dL  Low        32-36      [facility]  
   
                                        Comment on above:   Performed By: #### C  
REBECCA, BMP, , 68599-2, 56085-3 ####  
[facility] ([id number])  
[address] [place] [address]   
   
                      MCV (RBC) [Entitic vol] 69 fL      Low             P  
[facility]  
   
                                        Comment on above:   Performed By: #### DAVID FERNANDEZ, BMP, , 42148-6, 01435-3 ####  
[facility] ([id number])  
[address] [place] [address]   
   
                      Monocytes (Bld) [#/Vol] 0.7 10*3/uL Normal     0-0.9        
[facility]  
   
                                        Comment on above:   Performed By: #### DAVID FERNANDEZ, BMP, 88459-7, 63437-8, 06203-8 ####  
[facility] ([id number])  
[address] [place] [address]   
   
                      Monocytes/100 WBC (Bld) 4.8 %      Normal                P  
[facility]  
   
                                        Comment on above:   Performed By: #### C  
BCA, BMP, 58203-9, 16169-0, 89612-0 ####  
[facility] ([id number])  
[address] [place] [address]   
   
                                                    Neutrophils/100 WBC   
(Bld)           75.6 %          Normal                          [facility]  
   
                                        Comment on above:   Performed By: #### DAVID  
BCA, BMP, 68148-4, 02955-3, 23076-9 ####  
[facility] ([id number])  
[address] [place] [address]   
   
                                                    Platelet mean volume   
(Bld) [Entitic vol] 6.6 fL          Low             7-12            [facility]  
   
                                        Comment on above:   Performed By: #### DAVID  
BCA, BMP, 42198-9, 72997-2, 94309-3 ####  
[facility] ([id number])  
[address] [place] [address]   
   
                      Platelets (Bld) [#/Vol] 333 10*3/uL Normal     150-450      
[facility]  
   
                                        Comment on above:   Performed By: #### DAVID FERNANDEZ, BMP, 29072-3, 35669-3, 97186-5 ####  
[facility] ([id number])  
[address] [place] [address]   
   
                      RBC COUNT  4.88 X10E12/L Normal     3.80-5.20  [facility]  
   
                                        Comment on above:   Performed By: #### DAVID  
BCA, BMP, 71341-9, 32229-2, 58085-7 ####  
[facility] ([id number])  
[address] [place] [address]   
   
                                                    RBC morphology finding   
Nom (Bld)       REVIEWED        Normal                          [facility]  
   
                                        Comment on above:   Performed By: #### DAVID  
BCA, BMP, 27748-8, 00998-2, 07362-9 ####  
[facility] ([id number])  
[address] [place] [address]   
   
                      WBC (Bld) [#/Vol] 14.1 10*3/uL High       4.0-11.0   [facility]  
   
                                        Comment on above:   Performed By: #### C  
BISHOP FERNANDEZ, , 72043-6, 99475-4 ####  
[facility] ([id number])  
[address] [place] [address]   
   
                                                    Fibrin D-dimer DDU (PPP) [Ma  
ss/Vol]on 2024   
   
                      D DIMER    <150       Normal     <255       [facility]  
   
                                        Comment on above:   Result Comment:  
Results <255 ng/mL DDU: The presence of a  
VTE can safely be excluded with a negative  
D-Dimer result and Wells score. A negative  
result doesn't exclude the possibility of DIC.  
The test be repeated along with other  
diagnostic tests if the patient's symptoms  
persist or worsen.  
https://www.iStoryTime.com/dv/dl.[email or web address]=[email or web address]  
[email or web address]=acaea   
   
                                                            Performed By: #### C  
BISHOP FERNANDEZ, , 54075-4, 18907-4 ####  
[facility] ([id number])  
[address] [place] [address]   
   
                                                    MAGNESIUMon 2024   
   
                      Magnesium [Mass/Vol] 1.8 mg/dL  Normal     1.8-2.6    [facility]  
   
                                        Comment on above:   Performed By: #### C  
BISHOP FERNANDEZ, , 76801-5, 18793-7 ####  
[facility] ([id number])  
[address] [place] [address]   
   
                                                    Troponin I.cardiac High sens  
itivity method [Mass/Vol]on 2024   
   
                                                    TROPONIN I, HIGH   
SENSITIVITY     <2              Normal          <16             [facility]  
   
                                        Comment on above:   Performed By: #### C  
BISHOP FERNANDEZ, , 45432-3, 83402-4 ####  
[facility] ([id number])  
[address] [place] [address]   
   
                                                    XR CHEST 2 VWSon 2024   
   
                                        XR CHEST 2 VWS      XR CHEST 2 VWS  
XR CHEST 2 VWS  
History: . chest   
discomfort.  
Comparison: 2020  
Impression:  
No acute pulmonary   
process. No pneumothorax   
or pleural effusion.  
Nonenlarged heart.  
Workstation:[id number]  
Finalized by Dejon Khan MD on 2024   
9:49 PM             Normal                                  [facility]  
   
                                                    Chlamydia/GC DNA, Uron 10-24  
-2022   
   
                      Chlamydia Probe, Ur Negative   Normal     NEG        [facility]  
   
                                        Comment on above:   Result Comment: CHLA  
MYDIA TRACHOMATIS DNA not detected by   
nucleic acid amplification.  
This test is intended for medical purposes only and is not   
valid for the  
evaluation of suspected sexual abuse or for other forensic   
purposes.  
In certain contexts, culture may be required to meet   
applicable laws and  
regulations for diagnosis of C. trachomatis and N. gonorrhoeae   
infections.  
Per 2014 CDC recommendations, this test does not include   
confirmation of  
positive results by an alternative nucleic acid target.   
   
                                                            Performed By: #### T  
RCMOL, UCGP ####  
[facility]  
[address] [place], [place] [address] [phone]  
: Carson Santizo MD   
   
                      Gonorrhea Probe, Ur Negative   Normal     NEG        [facility]  
   
                                        Comment on above:   Result Comment: NEIS  
SERIA GONORRHOEAE DNA not detected by   
nucleic acid amplification.  
This test is intended for medical purposes only and is not   
valid for the  
evaluation of suspected sexual abuse or for other forensic   
purposes.  
In certain contexts, culture may be required to meet   
applicable laws and  
regulations for diagnosis of C. trachomatis and N. gonorrhoeae   
infections.  
Per 2014 CDC recommendations, this test does not include   
confirmation of  
positive results by an alternative nucleic acid target.   
   
                                                            Performed By: #### T  
RCMOL, UCGP ####  
[facility]  
[facility] [place], [place] [address] [phone]  
: Carson Santizo MD   
   
                                                    Trich Vag, Molecularon 10-22  
-2022   
   
                      Trich Vag, Molecular Negative   Normal     NEG        [facility]  
   
                                        Comment on above:   Result Comment: T. v  
aginalis DNA not detected  
Results should be interpreted in conjunction with other   
clinical data.  
This test is intended for medical purposes only and is not   
valid for the  
evaluation of suspected sexual abuse or for other forensic   
purposes.  
This test has not been evaluated in pregnant women or in   
patients less than 16  
years of age.   
   
                                                            Performed By: #### T  
RCCHRIS UCGP ####  
[facility]  
[address] [place], [place] [address] [phone]  
: Carson Santizo MD   
   
                      Source:    .URINE     Normal                [facility]  
   
                                        Comment on above:   Performed By: #### T  
RG, UCGP ####  
[facility]  
[address] [place], [place] [address] [phone]  
: Carson Santizo MD   
   
                                                    Acetaminophenon 2021   
   
                                                    Acetaminophen   
[Mass/Vol]      ug/mL           Low             10-30           [facility]  
   
                                        Comment on above:   Performed By: #### C  
OVRB ####  
[facility]  
[address]   
[place] [address] [phone]  
: Haresh Zimmer MD   
   
                                                    Acetaminophen LevelOrdered B  
y: Seth Rahman on 2021   
   
                          Acetaminophen Level <5           Low          10 - 30   
ug/mL                                   [facility]   
Phone:   
[phone]  
   
                                                    Interpretation and   
review of laboratory   
results         Abnormal                                        [facility]   
Phone:   
[phone]  
   
                                                                  [facility]   
Phone:   
[phone]  
   
                                                    CBC Auto DifferentialOrdered  
 By: Seth Rahman on 2021   
   
                      Absolute Eos # 0.62       High                  [facility]   
Phone:   
[phone]  
   
                                                    Absolute Immature   
Granulocyte     0.07                                            [facility]   
Phone:   
[phone]  
   
                      Absolute Lymph # 3.20                             [facility]   
Phone:   
[phone]  
   
                      Absolute Mono # 0.89                             [facility]   
Phone:   
[phone]  
   
                      Basophils (Bld) [#/Vol] 0.10 10*3/uL                        
 [facility]   
Phone:   
[phone]  
   
                      Basophils/100 WBC (Bld) 1 %                   0 - 2 %    M  
[facility]   
Phone:   
[phone]  
   
                      Differential Type NOT REPORTED                       [facility]   
Phone:   
[phone]  
   
                                                    Eosinophils/100 WBC   
(Bld)           5 %             High            1 - 4 %         [facility]   
Phone:   
[phone]  
   
                                                    Hematocrit (Bld)   
[Volume fraction]   43.4 %                                  36.3 - 47.1   
%                                       [facility]   
Phone:   
[phone]  
   
                                                    Hemoglobin.gastrointest  
inal spec 1 Ql (Stl) 14.6 g/dL                               11.9 - 15.1   
g/dL                                    [facility]   
Phone:   
[phone]  
   
                                                    Immature   
granulocytes/100 WBC   
(Bld)           1 %             High            0               [facility]   
Phone:   
[phone]  
   
                                                    Interpretation and   
review of laboratory   
results         Abnormal                                        [facility]   
Phone:   
[phone]  
   
                                                    Lymphocytes/100 WBC   
(Bld)           26 %                            24 - 43 %       [facility]   
Phone:   
[phone]  
   
                                                    MCH (RBC) [Entitic   
mass]               28.3 pg                                 25.2 - 33.5   
pg                                      [facility]   
Phone:   
[phone]  
   
                          MCHC (RBC) [Mass/Vol] 33.6 g/dL                 28.4 -  
 34.8   
g/dL                                    [facility]   
Phone:   
[phone]  
   
                          MCV (RBC) [Entitic vol] 84.3 fL                   82.6  
 -   
102.9 fL                                [facility]   
Phone:   
[phone]  
   
                      Monocytes/100 WBC (Bld) 7 %                   3 - 12 %   M  
[facility]   
Phone:   
[phone]  
   
                          NRBC Automated 0.0                       0.0 per 100   
WBC                                     [facility]   
Phone:   
[phone]  
   
                                                    Platelet distribution   
width (Bld) [Ratio] 12.3 %                                  11.8 - 14.4   
%                                       [facility]   
Phone:   
[phone]  
   
                      Platelet Estimate NOT REPORTED                       [facility]   
Phone:   
[phone]  
   
                                                    Platelet mean volume   
(Bld) [Entitic vol] 8.7 fL                                  8.1 - 13.5   
fL                                      [facility]   
Phone:   
[phone]  
   
                      Platelets (Bld) [#/Vol] 273 10*3/uL                         
[facility]   
Phone:   
[phone]  
   
                          RBC (Bld) [#/Vol] 5.15 10*6/uL High         3.95 - 5.1  
1   
m/uL                                    [facility]   
Phone:   
[phone]  
   
                      RBC (Bld) [#/Vol] NOT REPORTED                       [facility]   
Phone:   
[phone]  
   
                                                    Segmented   
neutrophils/100 WBC   
(Bld)           60 %                            36 - 65 %       [facility]   
Phone:   
[phone]  
   
                      Segs Absolute 7.61                             [facility]   
Phone:   
[phone]  
   
                      WBC (Bld) [#/Vol] 12.5 10*3/uL High                  [facility]   
Phone:   
[phone]  
   
                      WBC (Bld) [#/Vol] NOT REPORTED                       [facility]   
Phone:   
[phone]  
   
                                                                  [facility]   
Phone:   
[phone]  
   
                                                    CBC with Diffon 2021   
   
                      Abs. Basophil 0.10 k/uL  Normal     0.00-0.20  [facility]  
   
                                        Comment on above:   Performed By: #### D  
AU ####  
[facility]  
[address] [place], [place] [address] [phone]  
: Haresh Zimmer MD   
   
                      Abs.Imm.Granulocyte 0.07 k/uL  Normal     0.00-0.30  [facility]  
   
                                        Comment on above:   Performed By: #### D  
AU ####  
[facility]  
[address] [place], [place] [address] [phone]  
: Haresh Zimmer MD   
   
                      Abs.Neutrophil (Seg) 7.61 k/uL  Normal     1.50-8.10  [facility]  
   
                                        Comment on above:   Performed By: #### D  
AU ####  
[facility]  
[address] [place] [address] [phone]  
: Haresh Zimmer MD   
   
                      Basophils/100 WBC (Bld) 1 %        Normal     0-2        M  
[facility]  
   
                                        Comment on above:   Performed By: #### D  
AU ####  
[facility]  
[address] [place] [address]  
[phone]  
: Haresh Zimmer MD   
   
                                                    Eosinophils (Bld)   
[#/Vol]         0.62 10*3/uL    High            0.00-0.44       [facility]  
   
                                        Comment on above:   Performed By: #### D  
AU ####  
[facility]  
[address] [place] [address] [place] [place], [place] [address] [phone]  
: Haresh Zimmer MD   
   
                                                    Eosinophils/100 WBC   
(Bld)           5 %             High            1-4             [facility]  
   
                                        Comment on above:   Performed By: #### D  
AU ####  
[address] [place] [address]  
[phone]  
: Haresh Zimmer MD   
   
                                                    Erythrocyte   
distribution width   
(RBC) [Ratio]   12.3 %          Normal          11.8-14.4       [facility]  
   
                                        Comment on above:   Performed By: #### D  
AU ####  
[address] [place], [facility] [phone]  
: Haresh Zimmer MD   
   
                                                    Hematocrit (Bld)   
[Volume fraction] 43.4 %          Normal          36.3-47.1       [facility]  
   
                                        Comment on above:   Performed By: #### D  
AU ####  
[address] [place], [facility] [phone]  
: Haresh Zimmer MD   
   
                                                    Hemoglobin (Bld)   
[Mass/Vol]      14.6 g/dL       Normal          11.9-15.1       [facility]  
   
                                        Comment on above:   Performed By: #### D  
AU ####  
[address] [place] [address]  
[phone]  
: Haresh Zimmer MD   
   
                                                    Immature   
granulocytes/100 WBC   
(Bld)           1 %             High            0               [facility]  
   
                                        Comment on above:   Performed By: #### D  
AU ####  
[address] [place], [place] [address] [phone]  
: Haresh Zimmer MD   
   
                                                    Lymphocytes (Bld)   
[#/Vol]         3.20 10*3/uL    Normal          1.10-3.70       [facility]  
   
                                        Comment on above:   Performed By: #### D  
AU ####  
[facility]  
[address] [place] [address] [phone]  
: Haresh Zimmer MD   
   
                                                    Lymphocytes/100 WBC   
(Bld)           26 %            Normal          24-43           [facility]  
   
                                        Comment on above:   Performed By: #### D  
AU ####  
[facility]  
[address] [place], [facility] [phone]  
: Haresh Zimmer MD   
   
                                                    MCH (RBC) [Entitic   
mass]           28.3 pg         Normal          25.2-33.5       [facility]  
   
                                        Comment on above:   Performed By: #### D  
AU ####  
[address] [place] [address] [phone]  
: Haresh Zimmer MD   
   
                      MCHC (RBC) [Mass/Vol] 33.6 g/dL  Normal     28.4-34.8  [facility]  
   
                                        Comment on above:   Performed By: #### D  
AU ####  
[address] [place], [place] [address]  
[phone]  
: Haresh Zimmer MD   
   
                      MCV (RBC) [Entitic vol] 84.3 fL    Normal     82.6-102.9 [facility]  
   
                                        Comment on above:   Performed By: #### D  
AU ####  
[address] [place], [place] [address]  
[phone]  
: Haresh Zimmer MD   
   
                      Monocytes (Bld) [#/Vol] 0.89 10*3/uL Normal     0.10-1.20   
 [facility]  
   
                                        Comment on above:   Performed By: #### D  
AU ####  
[address] [place], [facility] [phone]  
: Haresh Zimmer MD   
   
                      Monocytes/100 WBC (Bld) 7 %        Normal     3-12       M  
[facility]  
   
                                        Comment on above:   Performed By: #### D  
AU ####  
[address] [place], [place] [address] [phone]  
: Haresh Zimmer MD   
   
                      Neutrophil (Seg) 60 %       Normal     36-65      [facility]  
   
                                        Comment on above:   Performed By: #### D  
AU ####  
[facility]  
[address] [place] [address] [place] [place], [place] [address] [phone]  
: Haresh Zimmer MD   
   
                      NRBC Automated 0.0 per 100 WBC Normal     0.0        [facility]  
   
                                        Comment on above:   Performed By: #### D  
AU ####  
[facility]  
[address] [place] [address] [address] [place], [place] [address] [phone]  
: Haresh Zimmer MD   
   
                                                    Platelet mean volume   
(Bld) [Entitic vol] 8.7 fL          Normal          8.1-13.5        [facility]  
   
                                        Comment on above:   Performed By: #### D  
AU ####  
[address] [place], [place] [address] [phone]  
: Haresh Zimmer MD   
   
                      Platelets (Bld) [#/Vol] 273 10*3/uL Normal     138-453      
[facility]  
   
                                        Comment on above:   Performed By: #### D  
AU ####  
[address] [place], [place] [address] [phone]  
: Haresh Zimmer MD   
   
                      RBC (Bld) [#/Vol] 5.15 10*6/uL High       3.95-5.11  [facility]  
   
                                        Comment on above:   Performed By: #### D  
AU ####  
[facility]  
[address] [place] [address] [place] [place], [place] [address] [phone]  
: Haresh Zimmer MD   
   
                      WBC (Bld) [#/Vol] 12.5 10*3/uL High       3.5-11.3   [facility]  
   
                                        Comment on above:   Performed By: #### D  
AU ####  
[facility]  
[address] [place] [address] [address] [place], [place] [address] [phone]  
: Haresh Zimmer MD   
   
                      Auto Diff Performed NOT REPORTED Normal                [facility]  
   
                                        Comment on above:   Performed By: #### D  
AU ####  
[facility]  
[address] [place], [place] [address] [phone]  
: Haresh Zimmer MD   
   
                      Platelet Estimate NOT REPORTED Normal                [facility]  
   
                                        Comment on above:   Performed By: #### D  
AU ####  
[facility]  
[address] [place], [place] [address] [phone]  
: Haresh Zimmer MD   
   
                                                    RBC morphology finding   
Nom (Bld)       NOT REPORTED    Normal                          [facility]  
   
                                        Comment on above:   Performed By: #### D  
AU ####  
[facility]  
[address] [place] [address] [phone]  
: Haresh Zimmer MD   
   
                      WBC Morphology NOT REPORTED Normal                [facility]  
   
                                        Comment on above:   Performed By: #### D  
AU ####  
[facility]  
[address] [place], [place] [address] [phone]  
: Haresh Zimmer MD   
   
                                                    COVID-19, RapidOrdered By: DAVID Rahman on 2021   
   
                                                    SARS-CoV-2 (COVID-19)   
RNA PAMELA+probe Ql (Unsp   
spec)               Not detected                            Not   
Detected                                [facility]  
Work   
Phone:   
[phone]  
   
                                        Comment on above:     
Rapid NAAT: The specimen is NEGATIVE for SARS-CoV-2, the novel   
coronavirus associated with  
COVID-19.  
  
The ID NOW COVID-19 assay is designed to detect the virus that   
causes COVID-19 in patients  
with signs and symptoms of infection who are suspected of   
COVID-19.  
An individual without symptoms of COVID-19 and who is not   
shedding SARS-CoV-2 virus would  
expect to have a negative (not detected) result in this assay.  
Negative results should be treated as presumptive and, if   
inconsistent with clinical signs  
and symptoms or necessary for patient management,  
should be tested with an alternative molecular assay. Negative   
results do not preclude  
SARS-CoV-2 infection and  
should not be used as the sole basis for patient management   
decisions.  
  
Fact sheet for Healthcare Providers:   
https://www.fda.gov/media/111286/download  
Fact sheet for Patients:   
https://www.fda.gov/media/047038/download  
  
Methodology: Isothermal Nucleic Acid Amplification  
  
   
   
                      Specimen Description .NASOPHARYNGEAL SWAB                   
      [facility]  
Work   
Phone:   
[phone]  
   
                                                                  [facility]  
Work   
Phone:   
[phone]  
   
                                                    Comp Metabolic Profon 2021   
   
                      (cont.)               Normal                [facility]  
   
                                        Comment on above:   Result Comment: Aver  
age GFR for 20-29 years old:  
116 mL/min/1.73sq m  
Chronic Kidney Disease:  
<60 mL/min/1.73sq m  
Kidney failure:  
<15 mL/min/1.73sq m  
eGFR calculated using average adult body mass. Additional eGFR   
calculator  
available at:  
http://www.[employer]/multiple_crcl_.htm   
   
                                                            Performed By: #### D  
AU ####  
[facility]  
[address] [place] [address] [phone]  
: Haresh Zimmer MD   
   
                      Albumin [Mass/Vol] 4.2 g/dL   Normal     3.5-5.2    [facility]  
   
                                        Comment on above:   Performed By: #### D  
AU ####  
[facility]  
[address] [place], [place] [id number] [phone]  
: Haresh Zimmer MD   
   
                      Albumin/Glob Ratio 1.4        Normal     1.0-2.5    [facility]  
   
                                        Comment on above:   Performed By: #### D  
AU ####  
[facility]  
[address] [place], [place] [id number] [phone]  
: Haresh Zimmer MD   
   
                      Alkaline Phos 74 U/L     Normal          [facility]  
   
                                        Comment on above:   Performed By: #### D  
AU ####  
[facility]  
[address] [place], [place] [address] [phone]  
: Haresh Zimmer MD   
   
                                                    ALT [Catalytic   
activity/Vol]   31 U/L          Normal          5-33            [facility]  
   
                                        Comment on above:   Performed By: #### D  
AU ####  
[facility]  
[address] [place] [address] [phone]  
: Haresh Zimmer MD   
   
                      Anion gap [Moles/Vol] 13 mmol/L  Normal     9-17       [facility]  
   
                                        Comment on above:   Performed By: #### D  
AU ####  
[facility]  
[address] [place], [place] [address] [phone]  
: Haresh Zimmer MD   
   
                                                    AST [Catalytic   
activity/Vol]   19 U/L          Normal          <32             [facility]  
   
                                        Comment on above:   Performed By: #### D  
AU ####  
[facility]  
[address] [place], [place] [address] [phone]  
: Haresh Zimmer MD   
   
                      Bilirubin [Mass/Vol] 0.17 mg/dL Low        0.3-1.2    [facility]  
   
                                        Comment on above:   Performed By: #### D  
AU ####  
[facility]  
[address] [place] [address] [address] [place], [place] [address] [phone]  
: Haresh Zimmer MD   
   
                      BUN/CRE Ratio 7          Low        9-20       [facility]  
   
                                        Comment on above:   Performed By: #### D  
AU ####  
[facility]  
[address] [place] [address] [place] [address]  
[phone]  
: Haresh Zimmer MD   
   
                      Calcium [Mass/Vol] 9.2 mg/dL  Normal     8.6-10.4   [facility]  
   
                                        Comment on above:   Performed By: #### D  
AU ####  
[facility]  
[address] [place] [address]  
[phone]  
: Haresh Zimmer MD   
   
                      Chloride [Moles/Vol] 104 mmol/L Normal          [facility]  
   
                                        Comment on above:   Performed By: #### D  
AU ####  
[facility]  
[address] [place] [address]  
[phone]  
: Haresh Zimmer MD   
   
                      CO2 [Moles/Vol] 20 mmol/L  Normal     20-31      [facility]  
   
                                        Comment on above:   Performed By: #### D  
AU ####  
[facility]  
[address] [place], [place] [address] [phone]  
: Haresh Zimmer MD   
   
                      Creatinine [Mass/Vol] 0.82 mg/dL Normal     0.50-0.90  [facility]  
   
                                        Comment on above:   Performed By: #### D  
AU ####  
[facility]  
[address] [place], [place] [address] [phone]  
: Haresh Zimmer MD   
   
                      GFR, Amer >60        Normal     >60        [facility]  
   
                                        Comment on above:   Performed By: #### D  
AU ####  
[facility]  
[address] [place], [place] [address] [phone]  
: Haresh Zimmer MD   
   
                      GFR,non  Amer >60        Normal     >60        [facility]  
   
                                        Comment on above:   Performed By: #### D  
AU ####  
[facility]  
[address] [place], [place] [address] [phone]  
: Haresh Zimmer MD   
   
                      Glucose [Mass/Vol] 100 mg/dL  High       70-99      [facility]  
   
                                        Comment on above:   Performed By: #### D  
AU ####  
[facility]  
[address] [place], [place] [address] [phone]  
: Haresh Zimmer MD   
   
                      Potassium [Moles/Vol] 4.0 mmol/L Normal     3.7-5.3    [facility]  
   
                                        Comment on above:   Performed By: #### D  
AU ####  
[facility]  
[address] [place] [address] [place] [address]  
[phone]  
: Haresh Zimmer MD   
   
                      Protein [Mass/Vol] 7.2 g/dL   Normal     6.4-8.3    [facility]  
   
                                        Comment on above:   Performed By: #### D  
AU ####  
[facility]  
[address] [place], [place] [address] [phone]  
: Haresh Zimmer MD   
   
                      Sodium [Moles/Vol] 137 mmol/L Normal     135-144    [facility]  
   
                                        Comment on above:   Performed By: #### D  
AU ####  
[facility]  
[address] [place] [address] [phone]  
: Haresh Zimmer MD   
   
                      Staging:              Normal                [facility]  
   
                                        Comment on above:   Result Comment: Stag  
e 1: Some kidney damage normal GFR  
Stage 2: Mild kidney damage GFR 60-89  
Stage 3: Moderate kidney damage GFR 30-59  
Stage 4: Severe kidney damage GFR 15-29  
Stage 5: Severe kidney damage GFR <15  
ESRD - chronic treatment by dialysis or transplant   
   
                                                            Performed By: #### D  
AU ####  
[facility]  
[address] [place] [address] [phone]  
: Haresh Zimmer MD   
   
                                                    Urea nitrogen   
[Mass/Vol]      6 mg/dL         Normal          6-20            [facility]  
   
                                        Comment on above:   Performed By: #### D  
AU ####  
[facility]  
[address] [place] [address] [phone]  
: Haresh Zimmer MD   
   
                                                    Comprehensive Metabolic Pane  
lOrdered By: Seth Rahman on 2021   
   
                          Albumin [Mass/Vol] 4.2 g/dL                  3.5 - 5.2  
   
g/dL                                    [facility]   
Phone:   
[phone]  
   
                                                    Albumin/Globulin [Mass   
ratio]          1.4 {ratio}                                     [facility]   
Phone:   
[phone]  
   
                                                    ALP (Bld) [Catalytic   
activity/Vol]       74 U/L                                  35 - 104   
U/L                                     [facility]   
Phone:   
[phone]  
   
                                                    ALT [Catalytic   
activity/Vol]   31 U/L                          5 - 33 U/L      [facility]   
Phone:   
[phone]  
   
                          Anion gap [Moles/Vol] 13 mmol/L                 9 - 17  
   
mmol/L                                  [facility]   
Phone:   
[phone]  
   
                                                    AST [Catalytic   
activity/Vol]   19 U/L                          <32             [facility]   
Phone:   
[phone]  
   
                          Bilirubin [Mass/Vol] 0.17 mg/dL   Low          0.3 - 1  
.2   
mg/dL                                   [facility]   
Phone:   
[phone]  
   
                          Calcium [Mass/Vol] 9.2 mg/dL                 8.6 - 10.  
4   
mg/dL                                   [facility]   
Phone:   
[phone]  
   
                          Chloride [Moles/Vol] 104 mmol/L                98 - 10  
7   
mmol/L                                  [facility]   
Phone:   
[phone]  
   
                          CO2 [Moles/Vol] 20 mmol/L                 20 - 31   
mmol/L                                  [facility]   
Phone:   
[phone]  
   
                          Creatinine [Mass/Vol] 0.82 mg/dL                0.50 -  
 0.90   
mg/dL                                   [facility]   
Phone:   
[phone]  
   
                                                    Free PSA/Total PSA   
[Mass fraction]     7.2 g/dL                                6.4 - 8.3   
g/dL                                    [facility]   
Phone:   
[phone]  
   
                      GFR  >60                   >60 mL/min [facility]  
y   
Health  
Work   
Phone:   
[phone]  
   
                                                    GFR Non-   
American        >60                             >60 mL/min      [facility]   
Phone:   
[phone]  
   
                          Glucose [Mass/Vol] 100 mg/dL    High         70 - 99   
mg/dL                                   [facility]   
Phone:   
[phone]  
   
                          Potassium [Moles/Vol] 4.0 mmol/L                3.7 -   
5.3   
mmol/L                                  [facility]   
Phone:   
[phone]  
   
                          Sodium [Moles/Vol] 137 mmol/L                135 - 144  
   
mmol/L                                  [facility]   
Phone:   
[phone]  
   
                                                    Urea nitrogen (BldV)   
[Mass/Vol]          6 mg/dL                                 6 - 20   
mg/dL                                   [facility]   
Phone:   
[phone]  
   
                                                    Urea   
nitrogen/Creatinine   
(Bld) [Mass ratio] 7               Low                             [facility]   
Phone:   
[phone]  
   
                                                    Drug Scr, Abuse, Uron 2021   
   
                      Amphetamine(s),Ur Negative   Normal     NEG        [facility]  
   
                                        Comment on above:   Performed By: #### D  
AU ####  
[facility]  
[address] [place] [address] [phone]  
: Haresh Zimmer MD   
   
                      Barbiturate(s),Ur Negative   Normal     NEG        [facility]  
   
                                        Comment on above:   Performed By: #### D  
AU ####  
[facility]  
[address] [place], [place] [id number] [phone]  
: Haresh Zimmer MD   
   
                      Benzodiazepine(s) Negative   Normal     NEG        [facility]  
   
                                        Comment on above:   Performed By: #### D  
AU ####  
[facility]  
[address] [place], [place] [address] [phone]  
: Haresh Zimmer MD   
   
                      Buprenorphrine, Ur Negative   Normal     NEG        [facility]  
   
                                        Comment on above:   Performed By: #### D  
AU ####  
[facility]  
[address] [place], [place] [address] [phone]  
: Haresh Zimmer MD   
   
                      Cannabinoid(s),Ur Positive   Abnormal   NEG        [facility]  
   
                                        Comment on above:   Performed By: #### D  
AU ####  
[facility]  
[address] [place], [place] [address] [phone]  
: Haresh Zimmer MD   
   
                      Cocaine Metabolite Negative   Normal     [facility]  
   
                                        Comment on above:   Performed By: #### D  
AU ####  
[facility]  
[address] [place] [address]  
[phone]  
: Haresh Zimmer MD   
   
                      Methadone Ql (U) Negative   Normal     [facility]  
   
                                        Comment on above:   Performed By: #### D  
AU ####  
[facility]  
[address] [place] [address] [address] [place], [place] [address] [phone]  
: Haresh Zimmer MD   
   
                      Methamphetamine, Ur Negative   Normal     [facility]  
   
                                        Comment on above:   Performed By: #### D  
AU ####  
[facility]  
[address] [place], [place] [address] [phone]  
: Haresh Zimmer MD   
   
                      Opiate(s), Ur Negative   Normal     [facility]  
   
                                        Comment on above:   Performed By: #### D  
AU ####  
[facility]  
[address] [place], [place] [address] [phone]  
: Haresh Zimmer MD   
   
                      Oxycodone, Urine Negative   Normal     [facility]  
   
                                        Comment on above:   Performed By: #### D  
AU ####  
[facility]  
[address] [place], [place] [address] [phone]  
: Haresh Zimmer MD   
   
                      Phencyclidine, Ur Negative   Normal     NEG        [facility]  
   
                                        Comment on above:   Performed By: #### D  
AU ####  
[facility]  
[address] [place], [place] [address] [phone]  
: Haresh Zimmer MD   
   
                      Propoxyphene,Urine Negative   Normal     NEG        [facility]  
   
                                        Comment on above:   Performed By: #### D  
AU ####  
[facility]  
[address] [place], [place] [address] [phone]  
: Haresh Zimmer MD   
   
                                                    Tricyclic   
antidepressants Screen   
Ql (U)          Negative        Normal          NEG             [facility]  
   
                                        Comment on above:   Result Comment: Drug  
 screen results are to be used for medical  
  
purposes only. All positive  
results are unconfirmed. Testing for employment or legal uses   
should be sent  
to a reference laboratory for confirmation.   
   
                                                            Performed By: #### D  
AU ####  
[facility]  
[address] [place] [address] [place] [address]  
[phone]  
: Haresh Zimmer MD   
   
                      Interpretive Info NOT REPORTED Normal                [facility]  
   
                                        Comment on above:   Performed By: #### D  
AU ####  
[facility]  
[address] [place] [address] [address] [place], [place] [address] [phone]  
: Haresh Zimmer MD   
   
                      MDMA, Urine NOT REPORTED Normal     NEG        [facility]  
   
                                        Comment on above:   Performed By: #### D  
AU ####  
[facility]  
[address] [place], [place] [address] [phone]  
: Haresh Zimmer MD   
   
                                                    EthanolOrdered By: Seth purvis on 2021   
   
                      Ethanol [Mass/Vol] mg/dL                 <10 mg/dL  [facility]  
[place]   
Phone:   
[phone]  
   
                      Ethanol percent <0.010                <0.010 %   [facility]   
Phone:   
[phone]  
   
                                                                  [facility]   
Phone:   
[phone]  
   
                                                    Ethanol Alcoholon 2021  
   
   
                      Ethanol [Mass/Vol] mg/dL      Normal     <10        [facility]  
   
                                        Comment on above:   Performed By: #### C  
OVRB ####  
[facility]  
[address] [place] [address]  
[phone]  
: Haresh Zimmer MD   
   
                      Ethanol percent <0.010     Normal     <0.010     [facility]  
   
                                        Comment on above:   Performed By: #### C  
OVRB ####  
[facility]  
[address] [place] [address] [phone]  
: Hraesh Zimmer MD   
   
                                                    HCG Qualitative, SerumOrdere  
d By: Seth Rahman on 2021   
   
                      hCG Qual   Negative              NEGATIVE   [facility]  
[place]   
Phone:   
[phone]  
   
                                        Comment on above:   Specimens with hCG l  
evels near the threshold of the test (25   
mIU/mL) may give a negative or  
indeterminate result. In such cases, another test should be   
performed with a new specimen  
in 48-72 hours. If early pregnancy is suspected clinically in   
this setting, correlation  
with quantitative serum b-hCG level is suggested.  
  
[employer] has confirmed the use of plasma for this   
test. This has not been cleared  
or approved by the U.S. Food and Drug Administration. The FDA   
has determined that such  
clearance is not necessary.  
  
   
   
                                                                  [facility]   
Phone:   
[phone]  
   
                                                    HCG Screen, Bloodon 20  
21   
   
                      HCG Screen, Blood Negative   Normal     NEG        [facility]  
   
                                        Comment on above:   Result Comment: Spec  
imens with hCG levels near the threshold   
of the test (25 mIU/mL) may give a  
negative or indeterminate result. In such cases, another test   
should be  
performed with a new specimen in 48-72 hours. If early   
pregnancy is suspected  
clinically in this setting, correlation with quantitative   
serum b-hCG level is  
suggested.  
[employer] has confirmed the use of plasma for this   
test. This has not  
been cleared or approved by the U.S. Food and Drug   
Administration. The FDA has  
determined that such clearance is not necessary.   
   
                                                            Performed By: #### D  
AU ####  
[facility]  
[address] [place] [address] [phone]  
: Haresh Zimmer MD   
   
                                                    Laboratory - Chemistry and C  
hemistry - challengeOrdered By: Seth Rahman on   
2021   
   
                                                    GFR/1.73 sq M.predicted   
MDRD (S/P/Bld) [Vol   
rate/Area]                                                      [facility]   
Phone:   
[phone]  
   
                                        Comment on above:   Average GFR for 20-2  
9 years old:  
116 mL/min/1.73sq m  
Chronic Kidney Disease:  
<60 mL/min/1.73sq m  
Kidney failure:  
<15 mL/min/1.73sq m  
  
  
eGFR calculated using average adult body mass. Additional eGFR   
calculator available at:  
  
http://www.[employer]/multiple_crcl_2012.htm  
  
  
   
   
                                                            Stage 1: Some kidney  
 damage normal GFR  
Stage 2: Mild kidney damage GFR 60-89  
Stage 3: Moderate kidney damage GFR 30-59  
Stage 4: Severe kidney damage GFR 15-29  
Stage 5: Severe kidney damage GFR <15  
ESRD - chronic treatment by dialysis or transplant  
  
  
   
   
                                                    No Panel InformationOrdered   
By: Seth Rahman on 2021   
   
                                                    Interpretation and   
review of laboratory   
results         Abnormal                                        [facility]   
Phone:   
[phone]  
   
                                                                  [facility]   
Phone:   
[phone]  
   
                                                    SARS-CoV-2on 2021   
   
                                                    SARS-CoV-2 (COVID-19)   
RNA PAMELA+probe Ql (Unsp   
spec)           Not detected    Normal          [facility]  
   
                                        Comment on above:   Result Comment:  
Rapid NAAT: The specimen is NEGATIVE for SARS-CoV-2, the novel   
coronavirus  
associated with COVID-19.  
The ID NOW COVID-19 assay is designed to detect the virus that   
causes COVID-19  
in patients with signs and symptoms of infection who are   
suspected of COVID-19.  
An individual without symptoms of COVID-19 and who is not   
shedding SARS-CoV-2  
virus would expect to have a negative (not detected) result in   
this assay.  
Negative results should be treated as presumptive and, if   
inconsistent with  
clinical signs and symptoms or necessary for patient   
management,  
should be tested with an alternative molecular assay. Negative   
results do not  
preclude SARS-CoV-2 infection and  
should not be used as the sole basis for patient management   
decisions.  
Fact sheet for Healthcare Providers:   
https://www.fda.gov/media/198999/download  
Fact sheet for Patients:   
https://www.fda.gov/media/246069/download  
Methodology: Isothermal Nucleic Acid Amplification   
   
                                                            Performed By: #### C  
OVRB ####  
[facility]  
[address] [place], [place] [address] [phone]  
: Haresh Zimmer MD   
   
                                                    Salicylateon 2021   
   
                      Salicylate <1         Low        3-10       [facility]  
   
                                        Comment on above:   Performed By: #### D  
AU ####  
[facility]  
[address] [place] [address] [phone]  
: Haresh Zimmer MD   
   
                                                    SalicylateOrdered By: Meghna Rahman on 2021   
   
                          Salicylate Lvl <1           Low          3 - 10   
mg/dL                                   [facility]  
Work   
Phone:   
[phone]  
   
                                                    Urinalysis w/ Microon 2021   
   
                      -----                 Normal                [facility]  
   
                                        Comment on above:   Performed By: #### C  
OVRB ####  
[facility]  
[address] [place], [place] [address] [phone]  
: Haresh Zimmer MD   
   
                      Bacteria   1+         Abnormal   NONE       [facility]  
   
                                        Comment on above:   Performed By: #### C  
OVRB ####  
[facility]  
[address] [place], [place] [address] [phone]  
: Haresh Zimmer MD   
   
                      Bilirubin, SemiQt,Ur Negative   Normal     NEG        [facility]  
   
                                        Comment on above:   Performed By: #### C  
OVRB ####  
[facility]  
[address] [place] [address] [phone]  
: Haresh Zimmer MD   
   
                      Blood, Urine Negative   Normal     NEG        [facility]  
   
                                        Comment on above:   Performed By: #### C  
OVRB ####  
[facility]  
[address] [place] [address] [phone]  
: Haresh Zimmer MD   
   
                      Clarity (U) CLEAR      Normal     CLEAR      [facility]  
   
                                        Comment on above:   Performed By: #### C  
OVRB ####  
[facility]  
[address] [place], [place] [address] [phone]  
: Haresh Zimmer MD   
   
                      Color (U)  YELLOW     Normal     YEL        [facility]  
   
                                        Comment on above:   Performed By: #### C  
OVRB ####  
[facility]  
[address] [place], [place] [address] [phone]  
: Haresh Zimmer MD   
   
                                                    Epithelial cells LM Ql   
(Urine sed)     5 TO 10         Normal          0-25            [facility]  
   
                                        Comment on above:   Performed By: #### C  
OVRB ####  
[facility]  
[address] [place], [place] [address] [phone]  
: Haresh Zimmer MD   
   
                      Glucose Ql (U) Negative   Normal     NEG        [facility]  
   
                                        Comment on above:   Performed By: #### C  
OVRB ####  
[facility]  
[address] [place], [place] [address] [phone]  
: Haresh Zimmer MD   
   
                      Ketones Ql (U) Negative   Normal     NEG        [facility]  
   
                                        Comment on above:   Performed By: #### C  
OVRB ####  
[address] [place], [place] [address] [phone]  
: Haresh Zimmer MD   
   
                                                    Leukocyte esterase Test   
strip Ql (U)    Negative        Normal          NEG             [facility]  
   
                                        Comment on above:   Performed By: #### C  
OVRB ####  
[facility]  
[address] [place], [place] [address] [phone]  
: Haresh Zimmer MD   
   
                      Mucus Strands 1+         Abnormal   NONE       [facility]  
   
                                        Comment on above:   Performed By: #### C  
OVRB ####  
[facility]  
[address] [place] [address] [phone]  
: Haresh Zimmer MD   
   
                      Nitrite,Ur Negative   Normal     NEG        [facility]  
   
                                        Comment on above:   Performed By: #### C  
OVRB ####  
[facility]  
[address] [place] [address]  
[phone]  
: Haresh Zimmer MD   
   
                      PH,Ur      6.5        Normal     5.0-9.0    [facility]  
   
                                        Comment on above:   Performed By: #### C  
OVRB ####  
[facility]  
[address] [place] [address]  
[phone]  
: Haresh Zimmer MD   
   
                      Protein Ql (U) Negative   Normal     NEG        [facility]  
   
                                        Comment on above:   Performed By: #### C  
OVRB ####  
[facility]  
[address] [place] [address]  
[phone]  
: Haresh Zimmer MD   
   
                      Spec. Gravity,Ur 1.020      Normal     1.010-1.020 [facility]  
   
                                        Comment on above:   Performed By: #### C  
OVRB ####  
[facility]  
[address] [place] [address]  
[phone]  
: Haresh Zimmer MD   
   
                      Urine RBC's 0 TO 2     Normal     0-2        [facility]  
   
                                        Comment on above:   Performed By: #### C  
OVRB ####  
[facility]  
[address] [place], [place] [address] [phone]  
: Haresh Zimmer MD   
   
                      Urine WBC's 0 TO 2     Normal     0-5        [facility]  
   
                                        Comment on above:   Performed By: #### C  
OVRB ####  
[facility]  
[address] [place], [facility]  
[phone]  
: Haresh Zimmer MD   
   
                      Urobilinogen,Ur Normal     Normal     NORM       [facility]  
   
                                        Comment on above:   Performed By: #### C  
OVRB ####  
[facility]  
[address] [place], [place] [address] [phone]  
: Haresh Zimmer MD   
   
                                                    Amorphous sediment LM   
Ql (Urine sed)  NOT REPORTED    Normal          NONE            [facility]  
   
                                        Comment on above:   Performed By: #### C  
OVRB ####  
[facility]  
[address] [place] [address] [phone]  
: Haresh Zimmer MD   
   
                      Casts      NOT REPORTED Normal                [facility]  
   
                                        Comment on above:   Performed By: #### C  
OVRB ####  
[facility]  
[address] [place] [address] [phone]  
: Haresh Zimmer MD   
   
                      Comment    NOT REPORTED Normal                [facility]  
   
                                        Comment on above:   Performed By: #### C  
OVRB ####  
[facility]  
[address] [place] [address] [phone]  
: Haresh Zimmer MD   
   
                                                    Crystals LM Nom (Urine   
sed)            NOT REPORTED    Normal          [facility]  
   
                                        Comment on above:   Performed By: #### C  
OVRB ####  
[facility]  
[address] [place] [address] [address] [place], [place] [address] [phone]  
: Haresh Zimmer MD   
   
                      Epithelial, Renal NOT REPORTED Normal     0          [facility]  
   
                                        Comment on above:   Performed By: #### C  
OVRB ####  
[facility]  
[address] [place], [place] [address] [phone]  
: Haresh Zimmer MD   
   
                      Other Observations NOT REPORTED Normal     [facility]  
   
                                        Comment on above:   Performed By: #### C  
OVRB ####  
[facility]  
[address] [place] [address] [address] [place], [place] [address] [phone]  
: Haresh Zimmer MD   
   
                      Trichomonas NOT REPORTED Normal     [facility]  
   
                                        Comment on above:   Performed By: #### C  
OVRB ####  
[facility]  
[address] [place], [place] [address] [phone]  
: Haresh Zimmer MD   
   
                      Yeast      NOT REPORTED Normal     [facility]  
   
                                        Comment on above:   Performed By: #### C  
OVRB ####  
[facility]  
[address] [place] [address] [phone]  
: Haresh Zimmer MD   
   
                                                    Urinalysis with microscopicO  
rdered By: Seth Rahman on 2021   
   
                      -                                           [facility]  
Work   
Phone:   
[phone]  
[phone]  
   
                      Amorphous, UA NOT REPORTED            None       [facility]  
Work   
Phone:   
[phone]  
   
                      Bacteria, UA 1+         Abnormal   None       [facility]  
Work   
Phone:   
[phone]  
   
                      Bilirubin Urine Negative              NEGATIVE   [facility]  
Work   
Phone:   
[phone]  
   
                      Casts UA   NOT REPORTED            /LPF       [facility]  
Work   
Phone:   
[phone]  
   
                      Color, UA  YELLOW                YELLOW     [facility]  
Work   
Phone:   
[phone]  
   
                      Crystals, UA NOT REPORTED            None /HPF  [facility]  
Work   
Phone:   
[phone]  
   
                      Epithelial Cells UA 5 TO 10                          [facility]  
Work   
Phone:   
[phone]  
   
                      Glucose, Ur Negative              NEGATIVE   [facility]  
Work   
Phone:   
[phone]  
   
                                                    Interpretation and   
review of laboratory   
results         Abnormal                                        [facility]  
Work   
Phone:   
[phone]  
   
                      Ketones Ql (U) Negative              NEGATIVE   [facility]  
Work   
Phone:   
[phone]  
   
                                                    Leukocyte esterase Test   
strip Ql (U)    Negative                        NEGATIVE        [facility]  
Work   
Phone:   
[phone]  
   
                      Mucus, UA  1+         Abnormal   None       [facility]  
Work   
Phone:   
[phone]  
   
                      Nitrite, Urine Negative              NEGATIVE   [facility]  
Work   
Phone:   
[phone]  
   
                      Other Observations UA NOT REPORTED            NOT REQ.   [facility]  
[facility]  
Work   
Phone:   
[phone]  
   
                      pH, UA     6.5                              [facility]  
Work   
Phone:   
[phone]  
   
                      Protein, UA Negative              NEGATIVE   [facility]  
Work   
Phone:   
[phone]  
   
                      RBC, UA    0 TO 2                           [facility]  
Work   
Phone:   
[phone]  
   
                      Renal Epithelial, UA NOT REPORTED            0 /HPF     Me  
y   
[employer]  
Work   
Phone:   
[phone]  
   
                      Specific Gravity, UA 1.020                            [facility]  
[employer]  
Work   
Phone:   
[phone]  
   
                      Trichomonas, UA NOT REPORTED            None       [facility]  
Work   
Phone:   
[phone]  
   
                      Turbidity UA CLEAR                 CLEAR      [facility]  
Work   
Phone:   
[phone]  
   
                      Urinalysis Comments NOT REPORTED                       [facility]  
   
Health  
Work   
Phone:   
[phone]  
   
                      Urine Hgb  Negative              NEGATIVE   [facility]   
[facility]  
Work   
Phone:   
[phone]  
   
                      Urobilinogen, Urine Normal                Normal     [facility]  
   
Health  
Work   
Phone:   
[phone]  
   
                      WBC, UA    0 TO 2                           [facility]   
Health  
Work   
Phone:   
[phone]  
   
                      Yeast, UA  NOT REPORTED            None       [facility]   
Health  
Work   
Phone:   
[phone]  
   
                                                                  [facility]   
[facility]  
Work   
Phone:   
[phone]  
   
                                                    Urine Drug ScreenOrdered By:  
 Seth Rahman on 2021   
   
                      Amphetamine Screen, Ur Negative              NEGATIVE   [facility]  
y   
Health  
Work   
Phone:   
[phone]  
   
                      Barbiturate Screen, Ur Negative              NEGATIVE   [facility]   
Health  
Work   
Phone:   
[phone]  
   
                                                    Benzodiazepine Screen,   
Urine           Negative                        NEGATIVE        [facility]   
Health  
Work   
Phone:   
[phone]  
   
                      Buprenorphine Urine Negative              NEGATIVE   [facility]  
   
Health  
Work   
Phone:   
[phone]  
   
                      Cannabinoid Scrn, Ur Positive   Abnormal   NEGATIVE   [facility]  
y   
Health  
Work   
Phone:   
[phone]  
   
                                                    Cocaine Metabolite,   
Urine           Negative                        NEGATIVE        [facility]   
Health  
Work   
Phone:   
[phone]  
   
                                                    Interpretation and   
review of laboratory   
results         Abnormal                                        [facility]   
Health  
Work   
Phone:   
[phone]  
   
                      MDMA, Urine NOT REPORTED            NEGATIVE   [facility]   
Health  
Work   
Phone:   
[phone]  
   
                      Methadone Screen, Urine Negative              NEGATIVE   [facility]   
Health  
Work   
Phone:   
[phone]  
   
                      Methamphetamine, Urine Negative              NEGATIVE   [facility]   
Health  
Work   
Phone:   
[phone]  
   
                      Opiates, Urine Negative              NEGATIVE   [facility]   
Health  
Work   
Phone:   
[phone]  
   
                      Oxycodone Screen, Ur Negative              NEGATIVE   [facility]  
y   
Health  
Work   
Phone:   
[phone]  
   
                      Phencyclidine, Urine Negative              NEGATIVE   [facility]  
y   
Health  
Work   
Phone:   
[phone]  
   
                      Propoxyphene, Urine Negative              NEGATIVE   [facility]  
   
Health  
Work   
Phone:   
[phone]  
   
                      Test Information NOT REPORTED                       [facility]   
Health  
Work   
Phone:   
[phone]  
   
                                                    Tricyclic   
Antidepressants, Urine Negative                        NEGATIVE        [facility]   
Health  
Work   
Phone:   
[phone]  
   
                                        Comment on above:   Drug screen results   
are to be used for medical purposes only.   
All positive results are  
unconfirmed. Testing for employment or legal uses should be   
sent to a reference laboratory  
for confirmation.  
  
   
   
                                                                  [facility]   
Phone:   
[phone]  
   
                                                    Acetaminophenon 2021   
   
                                                    Acetaminophen   
[Mass/Vol]      ug/mL           Low             10-30           [facility]  
   
                                        Comment on above:   Performed By: #### D  
AU ####  
[facility]  
[address] [place] [address] [phone]  
: Haresh Zimmer MD   
   
                                                    Acetaminophen levelOrdered B  
y: Malika Spicerin on 2021   
   
                          Acetaminophen Level <5           Low          10 - 30   
ug/mL                                   [facility]   
Phone:   
[phone]  
   
                                                    Interpretation and   
review of laboratory   
results         Abnormal                                        [facility]   
Phone:   
[phone]  
   
                                                                  [facility]   
Phone:   
[phone]  
   
                                                    CBC auto differentialOrdered  
 By: Malika Shepherd on 2021   
   
                      Absolute Eos # 0.18                             [facility]   
Phone:   
[phone]  
   
                                                    Absolute Immature   
Granulocyte     0.06                                            [facility]   
Phone:   
[phone]  
   
                      Absolute Lymph # 2.41                             [facility]   
Phone:   
[phone]  
   
                      Absolute Mono # 0.83                             [facility]   
Phone:   
[phone]  
   
                      Basophils (Bld) [#/Vol] 0.07 10*3/uL                        
 [facility]   
Phone:   
[phone]  
   
                      Basophils/100 WBC (Bld) 1 %                   0 - 2 %    M  
[facility]   
Phone:   
[phone]  
   
                      Differential Type NOT REPORTED                       [facility]   
Phone:   
[phone]  
   
                                                    Eosinophils/100 WBC   
(Bld)           1 %                             1 - 4 %         [facility]   
Phone:   
[phone]  
   
                                                    Hematocrit (Bld)   
[Volume fraction]   45.1 %                                  36.3 - 47.1   
%                                       [facility]   
Phone:   
[phone]  
   
                                                    Hemoglobin.gastrointest  
inal spec 1 Ql (Stl) 15.0 g/dL                               11.9 - 15.1   
g/dL                                    [facility]   
Phone:   
[phone]  
   
                                                    Immature   
granulocytes/100 WBC   
(Bld)           1 %             High            0               [facility]   
Phone:   
[phone]  
   
                                                    Interpretation and   
review of laboratory   
results         Abnormal                                        [facility]   
Phone:   
[phone]  
   
                                                    Lymphocytes/100 WBC   
(Bld)           18 %            Low             24 - 43 %       [facility]   
Phone:   
[phone]  
   
                                                    MCH (RBC) [Entitic   
mass]               28.4 pg                                 25.2 - 33.5   
pg                                      [facility]   
Phone:   
[phone]  
   
                          MCHC (RBC) [Mass/Vol] 33.3 g/dL                 28.4 -  
 34.8   
g/dL                                    [facility]   
Phone:   
[phone]  
   
                          MCV (RBC) [Entitic vol] 85.4 fL                   82.6  
 -   
102.9 fL                                [facility]   
Phone:   
[phone]  
   
                      Monocytes/100 WBC (Bld) 6 %                   3 - 12 %   M  
[facility]   
Phone:   
[phone]  
   
                          NRBC Automated 0.0                       0.0 per 100   
WBC                                     [facility]   
Phone:   
[phone]  
   
                                                    Platelet distribution   
width (Bld) [Ratio] 13.0 %                                  11.8 - 14.4   
%                                       [facility]   
Phone:   
[phone]  
   
                      Platelet Estimate NOT REPORTED                       [facility]   
Phone:   
[phone]  
   
                                                    Platelet mean volume   
(Bld) [Entitic vol] 8.3 fL                                  8.1 - 13.5   
fL                                      [facility]   
Phone:   
[phone]  
   
                      Platelets (Bld) [#/Vol] 301 10*3/uL                         
[facility]   
Phone:   
[phone]  
   
                          RBC (Bld) [#/Vol] 5.28 10*6/uL High         3.95 - 5.1  
1   
m/uL                                    [facility]   
Phone:   
[phone]  
   
                      RBC (Bld) [#/Vol] NOT REPORTED                       [facility]   
Phone:   
[phone]  
   
                                                    Segmented   
neutrophils/100 WBC   
(Bld)           73 %            High            36 - 65 %       [facility]   
Phone:   
[phone]  
   
                      Segs Absolute 9.71       High                  [facility]  
Work   
Phone:   
[phone]  
   
                      WBC (Bld) [#/Vol] 13.3 10*3/uL High                  [facility]  
Work   
Phone:   
[phone]  
   
                      WBC (Bld) [#/Vol] NOT REPORTED                       [facility]  
[employer]   
Phone:   
[phone]  
   
                                                                  [facility]   
[employer]   
Phone:   
[phone]  
   
                                                    CBC with Diffon 2021   
   
                      Abs. Basophil 0.07 k/uL  Normal     0.00-0.20  [facility]  
   
                                        Comment on above:   Performed By: #### C  
DP, SALI, CP, HCG ####  
[address] [place] [address] [phone]  
: Haresh Zimmer MD   
   
                      Abs.Imm.Granulocyte 0.06 k/uL  Normal     0.00-0.30  [facility]  
   
                                        Comment on above:   Performed By: #### C  
DP, SALI, CP, HCG ####  
[facility]  
[address] [place], [place] [address]  
[phone]  
: Haresh Zimmer MD   
   
                      Abs.Neutrophil (Seg) 9.71 k/uL  High       1.50-8.10  [facility]  
   
                                        Comment on above:   Performed By: #### C  
DP, SALI, CP, HCG ####  
[address] [place], [facility]83 [phone]  
: Haresh Zimmer MD   
   
                      Basophils/100 WBC (Bld) 1 %        Normal     0-2        [facility]  
   
                                        Comment on above:   Performed By: #### C  
DP, SALI, CP, HCG ####  
[facility]  
[address] [place], [place] [address]  
[phone]  
: Haresh Zimmer MD   
   
                                                    Eosinophils (Bld)   
[#/Vol]         0.18 10*3/uL    Normal          0.00-0.44       [facility]  
   
                                        Comment on above:   Performed By: #### C  
DP, SALI, CP, HCG ####  
[facility]  
[address] [place] [address] [place] [address] [phone]  
: Haresh Zimmer MD   
   
                                                    Eosinophils/100 WBC   
(Bld)           1 %             Normal          1-4             [facility]  
   
                                        Comment on above:   Performed By: #### C  
DP, SALI, CP, HCG ####  
[address] [place] [address]  
[phone]  
: Haresh Zimmer MD   
   
                                                    Erythrocyte   
distribution width   
(RBC) [Ratio]   13.0 %          Normal          11.8-14.4       [facility]  
   
                                        Comment on above:   Performed By: #### C  
DP, SALI, CP, HCG ####  
[address] [place] [address]  
[phone]  
: Haresh Zimmer MD   
   
                                                    Hematocrit (Bld)   
[Volume fraction] 45.1 %          Normal          36.3-47.1       [facility]  
   
                                        Comment on above:   Performed By: #### C  
DP, SALI, CP, HCG ####  
[address] [place] [address]  
[phone]  
: Haresh Zimmer MD   
   
                                                    Hemoglobin (Bld)   
[Mass/Vol]      15.0 g/dL       Normal          11.9-15.1       [facility]  
   
                                        Comment on above:   Performed By: #### C  
DP, SALI, CP, HCG ####  
[address] [place] [address] [phone]  
: Haresh Zimmer MD   
   
                                                    Immature   
granulocytes/100 WBC   
(Bld)           1 %             High            0               [facility]  
   
                                        Comment on above:   Performed By: #### C  
DP, SALI, CP, HCG ####  
[address] [place] [address] [phone]  
: Haresh Zimmer MD   
   
                                                    Lymphocytes (Bld)   
[#/Vol]         2.41 10*3/uL    Normal          1.10-3.70       [facility]  
   
                                        Comment on above:   Performed By: #### C  
DP, SALI, CP, HCG ####  
[address] [place] [address]  
[phone]  
: Haresh Zimmer MD   
   
                                                    Lymphocytes/100 WBC   
(Bld)           18 %            Low             24-43           [facility]  
   
                                        Comment on above:   Performed By: #### C  
DP, SALI, CP, HCG ####  
[facility]  
[address] [place] [address]  
[phone]  
: Haresh Zimmer MD   
   
                                                    MCH (RBC) [Entitic   
mass]           28.4 pg         Normal          25.2-33.5       [facility]  
   
                                        Comment on above:   Performed By: #### C  
DP, SALI, CP, HCG ####  
[address] [place] [address]  
[phone]  
: Haresh Zimmer MD   
   
                      MCHC (RBC) [Mass/Vol] 33.3 g/dL  Normal     28.4-34.8  [facility]  
   
                                        Comment on above:   Performed By: #### C  
DP, SALI, CP, HCG ####  
[address] [place] [address]  
[phone]  
: Haresh Zimmer MD   
   
                      MCV (RBC) [Entitic vol] 85.4 fL    Normal     82.6-102.9 [facility]  
   
                                        Comment on above:   Performed By: #### C  
DP, SALI, CP, HCG ####  
[address] [place] [address]  
[phone]  
: Haresh Zimmer MD   
   
                      Monocytes (Bld) [#/Vol] 0.83 10*3/uL Normal     0.10-1.20   
 [facility]  
   
                                        Comment on above:   Performed By: #### C  
DP, SALI, CP, HCG ####  
[address] [place] [address]  
[phone]  
: Haresh Zimmer MD   
   
                      Monocytes/100 WBC (Bld) 6 %        Normal     3-12       [facility]  
[facility]  
   
                                        Comment on above:   Performed By: #### C  
DP, SALI, CP, HCG ####  
[address] [place] [address]  
[phone]  
: Haresh Zimmer MD   
   
                      Neutrophil (Seg) 73 %       High       36-65      [facility]  
   
                                        Comment on above:   Performed By: #### C  
DP, SALI, CP, HCG ####  
[facility]  
[address] [place], [place] [address] [phone]  
: Haresh Zimmer MD   
   
                      NRBC Automated 0.0 per 100 WBC Normal     0.0        [facility]  
   
                                        Comment on above:   Performed By: #### C  
DP, SALI, CP, HCG ####  
[facility]  
[address] [place] [address]  
[phone]  
: Haresh Zimmer MD   
   
                                                    Platelet mean volume   
(Bld) [Entitic vol] 8.3 fL          Normal          8.1-13.5        [facility]  
   
                                        Comment on above:   Performed By: #### C  
DPWEN, CP, HCG ####  
[facility]  
[address] [place] [address]  
[phone]  
: Haresh Zimmer MD   
   
                      Platelets (Bld) [#/Vol] 301 10*3/uL Normal     138-453      
[facility]  
   
                                        Comment on above:   Performed By: #### C  
DPWEN, CP, HCG ####  
[address] [place], [place] [address] [phone]  
: Haresh Zimmer MD   
   
                      RBC (Bld) [#/Vol] 5.28 10*6/uL High       3.95-5.11  [facility]  
   
                                        Comment on above:   Performed By: #### C  
DP, SALI, CP, HCG ####  
[facility]  
[address] [place], [place] [address] [phone]  
: Haresh Zimmer MD   
   
                      WBC (Bld) [#/Vol] 13.3 10*3/uL High       3.5-11.3   [facility]  
   
                                        Comment on above:   Performed By: #### C  
DP, SALI, CP, HCG ####  
[address] [place] [address]  
[phone]  
: aHresh Zimmer MD   
   
                      Auto Diff Performed NOT REPORTED Normal                [facility]  
   
                                        Comment on above:   Performed By: #### C  
DP, SALI, CP, HCG ####  
[facility]  
[address] [place] [address] [place] [address]  
[phone]  
: Haresh Zimmer MD   
   
                      Platelet Estimate NOT REPORTED Normal                [facility]  
   
                                        Comment on above:   Performed By: #### C  
DP, SALI, CP, HCG ####  
[facility]  
[address] [place] [address] [place] [address]  
[phone]  
: Haresh Zimmer MD   
   
                                                    RBC morphology finding   
Nom (Bld)       NOT REPORTED    Normal                          [facility]  
   
                                        Comment on above:   Performed By: #### C  
DP, SALI, CP, HCG ####  
[address] [place] [address]  
[phone]  
: Haresh Zimmer MD   
   
                      WBC Morphology NOT REPORTED Normal                [facility]  
   
                                        Comment on above:   Performed By: #### C  
DP, SALI, CP, HCG ####  
[facility]  
[address] [place] [address] [place] [address]  
[phone]  
: Haresh Zimmer MD   
   
                                                    COVID-19, RapidOrdered By: SIMONE Shepherd on 2021   
   
                                                    SARS-CoV-2 (COVID-19)   
RNA PAMELA+probe Ql (Unsp   
spec)               Not detected                            Not   
Detected                                [facility]  
Work   
Phone:   
[phone]  
   
                                        Comment on above:     
Rapid NAAT: The specimen is NEGATIVE for SARS-CoV-2, the novel   
coronavirus associated with  
COVID-19.  
  
The ID NOW COVID-19 assay is designed to detect the virus that   
causes COVID-19 in patients  
with signs and symptoms of infection who are suspected of   
COVID-19.  
An individual without symptoms of COVID-19 and who is not   
shedding SARS-CoV-2 virus would  
expect to have a negative (not detected) result in this assay.  
Negative results should be treated as presumptive and, if   
inconsistent with clinical signs  
and symptoms or necessary for patient management,  
should be tested with an alternative molecular assay. Negative   
results do not preclude  
SARS-CoV-2 infection and  
should not be used as the sole basis for patient management   
decisions.  
  
Fact sheet for Healthcare Providers:   
https://www.fda.gov/media/703475/download  
Fact sheet for Patients:   
https://www.fda.gov/media/275903/download  
  
Methodology: Isothermal Nucleic Acid Amplification  
  
   
   
                      Specimen Description .NASOPHARYNGEAL SWAB                   
      [facility]  
Work   
Phone:   
[phone]  
   
                                                                  [facility]  
Work   
Phone:   
[phone]  
   
                                                    Comp Metabolic Profon 2021   
   
                      (cont.)               Normal                [facility]  
   
                                        Comment on above:   Result Comment: Aver  
age GFR for 20-29 years old:  
116 mL/min/1.73sq m  
Chronic Kidney Disease:  
<60 mL/min/1.73sq m  
Kidney failure:  
<15 mL/min/1.73sq m  
eGFR calculated using average adult body mass. Additional eGFR   
calculator  
available at:  
http://www.[employer]/multiple_crcl_.htm   
   
                                                            Performed By: #### C  
DP, SALI, CP, HCG ####  
[facility]  
[address] [place] [address] [phone]  
: Haresh Zimmer MD   
   
                      Albumin [Mass/Vol] 4.4 g/dL   Normal     3.5-5.2    [facility]  
   
                                        Comment on above:   Performed By: #### C  
DP, SALI, CP, HCG ####  
[facility]  
[address] [place] [address] [phone]  
: Haresh Zimmer MD   
   
                      Albumin/Glob Ratio 1.3        Normal     1.0-2.5    [facility]  
   
                                        Comment on above:   Performed By: #### C  
DP, SALI, CP, HCG ####  
[facility]  
[address] [place] [address] [phone]  
: Haresh Zimmer MD   
   
                      Alkaline Phos 82 U/L     Normal          [facility]  
   
                                        Comment on above:   Performed By: #### C  
DP, SALI, CP, HCG ####  
[facility]  
[address] [place] [address] [phone]  
: Haresh Zimmer MD   
   
                                                    ALT [Catalytic   
activity/Vol]   40 U/L          High            5-33            [facility]  
   
                                        Comment on above:   Performed By: #### C  
DP, SALI, CP, HCG ####  
[facility]  
[address] [place], [place] [address] [phone]  
: Haresh Zimmer MD   
   
                      Anion gap [Moles/Vol] 13 mmol/L  Normal     9-17       [facility]  
   
                                        Comment on above:   Performed By: #### C  
DP, SALI, CP, HCG ####  
[facility]  
[address] [place] [address] [place] [place], [place] [address] [phone]  
: Haresh Zimmer MD   
   
                                                    AST [Catalytic   
activity/Vol]   26 U/L          Normal          <32             [facility]  
   
                                        Comment on above:   Performed By: #### C  
DP, SALI, CP, HCG ####  
[facility]  
[address] [place] [address] [place] [place], [place] [address] [phone]  
: Haresh Zimmer MD   
   
                      Bilirubin [Mass/Vol] 0.39 mg/dL Normal     0.3-1.2    [facility]  
   
                                        Comment on above:   Performed By: #### C  
DP, SALI, CP, HCG ####  
[facility]  
[address] [place] [address] [place] [place], [place] [address] [phone]  
: Haresh Zimmer MD   
   
                      BUN/CRE Ratio 12         Normal     9-20       [facility]  
   
                                        Comment on above:   Performed By: #### C  
DP, SALI, CP, HCG ####  
[facility]  
[address] [place], [place] [address] [phone]  
: Haresh Zimmer MD   
   
                      Calcium [Mass/Vol] 9.9 mg/dL  Normal     8.6-10.4   [facility]  
   
                                        Comment on above:   Performed By: #### C  
DP, SALI, CP, HCG ####  
[facility]  
[address] [place] [address] [place] [place], [place] [id number] [phone]  
: Haresh Zimmer MD   
   
                      Chloride [Moles/Vol] 99 mmol/L  Normal          [facility]  
   
                                        Comment on above:   Performed By: #### C  
DP, SALI, CP, HCG ####  
[facility]  
[address] [place] [address]  
[phone]  
: Haresh Zimmer MD   
   
                      CO2 [Moles/Vol] 25 mmol/L  Normal     20-31      [facility]  
   
                                        Comment on above:   Performed By: #### C  
DP, SALI, CP, HCG ####  
[facility]  
[address] [place], [facility]  
[phone]  
: Haresh Zimmer MD   
   
                      Creatinine [Mass/Vol] 0.81 mg/dL Normal     0.50-0.90  [facility]  
   
                                        Comment on above:   Performed By: #### C  
DP, SALI, CP, HCG ####  
[address] [place], [facility]  
[phone]  
: Haresh Zimmer MD   
   
                      GFR, Amer >60        Normal     >60        [facility]  
   
                                        Comment on above:   Performed By: #### C  
DP, SALI, CP, HCG ####  
[address] [place], [facility]  
[phone]  
: Haresh Zimmer MD   
   
                      GFR,non  Amer >60        Normal     >60        [facility]  
   
                                        Comment on above:   Performed By: #### C  
DP, SALI, CP, HCG ####  
[address] [place], [facility]  
[phone]  
: Haresh Zimmer MD   
   
                      Glucose [Mass/Vol] 86 mg/dL   Normal     70-99      [facility]  
   
                                        Comment on above:   Performed By: #### C  
DP, SALI, CP, HCG ####  
[address] [place], [facility]83 [phone]  
: Haresh Zimmer MD   
   
                      Potassium [Moles/Vol] 3.7 mmol/L Normal     3.7-5.3    [facility]  
   
                                        Comment on above:   Performed By: #### C  
DP, SALI, CP, HCG ####  
[facility]  
[address] [place] [address] [phone]  
: Haresh Zimmer MD   
   
                      Protein [Mass/Vol] 7.8 g/dL   Normal     6.4-8.3    [facility]  
   
                                        Comment on above:   Performed By: #### C  
DP, SALI, CP, HCG ####  
[facility]  
[address] [place] [address] [phone]  
: Haresh Zimmer MD   
   
                      Sodium [Moles/Vol] 137 mmol/L Normal     135-144    [facility]  
   
                                        Comment on above:   Performed By: #### C  
DP, SALI, CP, HCG ####  
[address] [place] [address] [phone]  
: Haresh Zimmer MD   
   
                      Staging:              Normal                [facility]  
   
                                        Comment on above:   Result Comment: Stag  
e 1: Some kidney damage normal GFR  
Stage 2: Mild kidney damage GFR 60-89  
Stage 3: Moderate kidney damage GFR 30-59  
Stage 4: Severe kidney damage GFR 15-29  
Stage 5: Severe kidney damage GFR <15  
ESRD - chronic treatment by dialysis or transplant   
   
                                                            Performed By: #### C  
DP, SALI, CP, HCG ####  
[address] [place] [address] [phone]  
: Haresh Zimmer MD   
   
                                                    Urea nitrogen   
[Mass/Vol]      10 mg/dL        Normal          6-20            [facility]  
   
                                        Comment on above:   Performed By: #### C  
DP, SALI, CP, HCG ####  
[address] [place] [address] [phone]  
: Haresh Zimmer MD   
   
                                                    Comprehensive Metabolic Pane  
lOrdered By: Malika Shepherd on 2021   
   
                          Albumin [Mass/Vol] 4.4 g/dL                  3.5 - 5.2  
   
g/dL                                    [facility]  
Work   
Phone:   
[phone]  
   
                                                    Albumin/Globulin [Mass   
ratio]          1.3 {ratio}                                     [facility]  
Work   
Phone:   
[phone]  
   
                                                    ALP (Bld) [Catalytic   
activity/Vol]       82 U/L                                  35 - 104   
U/L                                     [facility]   
Phone:   
[phone]  
   
                                                    ALT [Catalytic   
activity/Vol]   40 U/L          High            5 - 33 U/L      [facility]   
Phone:   
[phone]  
   
                          Anion gap [Moles/Vol] 13 mmol/L                 9 - 17  
   
mmol/L                                  [facility]   
Phone:   
[phone]  
   
                                                    AST [Catalytic   
activity/Vol]   26 U/L                          <32             [facility]   
Phone:   
[phone]  
   
                          Bilirubin [Mass/Vol] 0.39 mg/dL                0.3 - 1  
.2   
mg/dL                                   [facility]   
Phone:   
[phone]  
   
                          Calcium [Mass/Vol] 9.9 mg/dL                 8.6 - 10.  
4   
mg/dL                                   [facility]   
Phone:   
[phone]  
   
                          Chloride [Moles/Vol] 99 mmol/L                 98 - 10  
7   
mmol/L                                  [facility]   
Phone:   
[phone]  
   
                          CO2 [Moles/Vol] 25 mmol/L                 20 - 31   
mmol/L                                  [facility]   
Phone:   
[phone]  
   
                          Creatinine [Mass/Vol] 0.81 mg/dL                0.50 -  
 0.90   
mg/dL                                   [facility]   
Phone:   
[phone]  
   
                                                    Free PSA/Total PSA   
[Mass fraction]     7.8 g/dL                                6.4 - 8.3   
g/dL                                    [facility]   
Phone:   
[phone]  
   
                      GFR  >60                   >60 mL/min [facility]  
y   
Health  
Work   
Phone:   
[phone]  
   
                                                    GFR Non-   
American        >60                             >60 mL/min      [facility]   
Phone:   
[phone]  
   
                          Glucose [Mass/Vol] 86 mg/dL                  70 - 99   
mg/dL                                   [facility]   
Phone:   
[phone]  
   
                          Potassium [Moles/Vol] 3.7 mmol/L                3.7 -   
5.3   
mmol/L                                  [facility]   
Phone:   
[phone]  
   
                          Sodium [Moles/Vol] 137 mmol/L                135 - 144  
   
mmol/L                                  [facility]   
Phone:   
[phone]  
   
                                                    Urea nitrogen (BldV)   
[Mass/Vol]          10 mg/dL                                6 - 20   
mg/dL                                   [facility]  
Work   
Phone:   
[phone]  
   
                                                    Urea   
nitrogen/Creatinine   
(Bld) [Mass ratio] 12                                              [facility]  
Work   
Phone:   
[phone]  
   
                                                    Drug Scr, Abuse, Uron 2021   
   
                      Amphetamine(s),Ur Negative   Normal     [facility]  
   
                                        Comment on above:   Performed By: #### C  
OVRB ####  
[facility]  
[address] [place], [place] [address] [phone]  
: Haresh Zimmer MD   
   
                      Barbiturate(s),Ur Negative   Normal     NEG        [facility]  
   
                                        Comment on above:   Performed By: #### C  
OVRB ####  
[facility]  
[address] [place], [place] [address] [phone]  
: Haresh Zimmer MD   
   
                      Benzodiazepine(s) Negative   Normal     [facility]  
   
                                        Comment on above:   Performed By: #### C  
OVRB ####  
[facility]  
[address] [place] [address]  
[phone]  
: Haresh Zimmer MD   
   
                      Buprenorphrine, Ur Negative   Normal     [facility] [facility]  
   
                                        Comment on above:   Performed By: #### C  
OVRB ####  
[facility]  
[address] [place] [address] [place] [address]  
[phone]  
: Haresh Zimmer MD   
   
                      Cannabinoid(s),Ur Positive   Abnormal   NEG        [facility]  
   
                                        Comment on above:   Performed By: #### C  
OVRB ####  
[facility]  
[address] [place] [address]  
[phone]  
: Haresh Zimemr MD   
   
                      Cocaine Metabolite Negative   Normal     [facility]  
   
                                        Comment on above:   Performed By: #### C  
OVRB ####  
[facility]  
[address] [place], [place] [address] [phone]  
: Haresh Zimmer MD   
   
                      Methadone Ql (U) Negative   Normal     [facility]  
   
                                        Comment on above:   Performed By: #### C  
OVRB ####  
[facility]  
[address] [place], [place] [address] [phone]  
: Haresh Zimmer MD   
   
                      Methamphetamine, Ur Negative   Normal     NEG        [facility]  
   
                                        Comment on above:   Performed By: #### C  
OVRB ####  
[facility]  
[address] [place], [place] [address] [phone]  
: Haresh Zimmer MD   
   
                      Opiate(s), Ur Negative   Normal     NEG        [facility]  
   
                                        Comment on above:   Performed By: #### C  
OVRB ####  
[facility]  
[address] [place], [place] [address] [phone]  
: Haresh Zimmer MD   
   
                      Oxycodone, Urine Negative   Normal     [facility]  
   
                                        Comment on above:   Performed By: #### C  
OVRB ####  
[facility]  
[address] [place], [place] [address] [phone]  
: Haresh Zimmer MD   
   
                      Phencyclidine, Ur Negative   Normal     NEG        [facility]  
   
                                        Comment on above:   Performed By: #### C  
OVRB ####  
[facility]  
[address] [place] [address] [place] [place], [place] [address] [phone]  
: Haresh Zimmer MD   
   
                      Propoxyphene,Urine Negative   Normal     [facility]  
   
                                        Comment on above:   Performed By: #### C  
OVRB ####  
[facility]  
[address] [place], [place] [address] [phone]  
: Haresh Zimmer MD   
   
                                                    Tricyclic   
antidepressants Screen   
Ql (U)          Negative        [facility]  
   
                                        Comment on above:   Result Comment: Drug  
 screen results are to be used for medical  
  
purposes only. All positive  
results are unconfirmed. Testing for employment or legal uses   
should be sent  
to a reference laboratory for confirmation.   
   
                                                            Performed By: #### C  
OVRB ####  
[facility]  
[address] [place], [place] [address] [phone]  
: Haresh Zimmer MD   
   
                      Interpretive Info NOT REPORTED Normal                [facility]  
   
                                        Comment on above:   Performed By: #### C  
OVRB ####  
[facility]  
[address] [place] [address] [phone]  
: Haresh Zimmer MD   
   
                      MDMA, Urine NOT REPORTED Normal     NEG        [facility]  
   
                                        Comment on above:   Performed By: #### C  
OVRB ####  
[facility]  
[address] [place] [address] [place] [address] [phone]  
: Haresh Zimmer MD   
   
                                                    EthanolOrdered By: Malika hernadez on 2021   
   
                      Ethanol [Mass/Vol] mg/dL                 <10 mg/dL  [facility]   
Phone:   
[phone]  
   
                      Ethanol percent <0.010                <0.010 %   [facility]   
Phone:   
[phone]  
   
                                                                  [facility]   
Phone:   
[phone]  
   
                                                    Ethanol Alcoholon 2021  
   
   
                      Ethanol [Mass/Vol] mg/dL      Normal     <10        [facility]  
   
                                        Comment on above:   Performed By: #### D  
AU ####  
[facility]  
[address] [place] [address] [place] [address] [phone]  
: Haresh Zimmer MD   
   
                      Ethanol percent <0.010     Normal     <0.010     [facility]  
   
                                        Comment on above:   Performed By: #### D  
AU ####  
[address] [place] [address] [phone]  
: Haresh Zimmer MD   
   
                                                    HCG Qualitative, SerumOrdere  
d By: Malika Shepherd on 2021   
   
                      hCG Qual   Negative              NEGATIVE   [facility]   
Phone:   
[phone]  
   
                                        Comment on above:   Specimens with hCG l  
evels near the threshold of the test (25   
mIU/mL) may give a negative or  
indeterminate result. In such cases, another test should be   
performed with a new specimen  
in 48-72 hours. If early pregnancy is suspected clinically in   
this setting, correlation  
with quantitative serum b-hCG level is suggested.  
  
[employer] has confirmed the use of plasma for this   
test. This has not been cleared  
or approved by the U.S. Food and Drug Administration. The FDA   
has determined that such  
clearance is not necessary.  
  
   
   
                                                                  [facility]   
Phone:   
[phone]  
   
                                                    HCG Screen, Bloodon 20   
   
                      HCG Screen, Blood Negative   Normal     NEG        [facility]  
   
                                        Comment on above:   Result Comment: Spec  
imens with hCG levels near the threshold   
of the test (25 mIU/mL) may give a  
negative or indeterminate result. In such cases, another test   
should be  
performed with a new specimen in 48-72 hours. If early   
pregnancy is suspected  
clinically in this setting, correlation with quantitative   
serum b-hCG level is  
suggested.  
[employer] has confirmed the use of plasma for this   
test. This has not  
been cleared or approved by the U.S. Food and Drug   
Administration. The FDA has  
determined that such clearance is not necessary.   
   
                                                            Performed By: #### C  
DP, SALI, CP, HCG ####  
[facility]  
[address] [place] [address]  
[phone]  
: Haresh Zimmer MD   
   
                                                    Laboratory - Chemistry and C  
hemistry - challengeOrdered By: Malika Shepherd on   
2021   
   
                                                    GFR/1.73 sq M.predicted   
MDRD (S/P/Bld) [Vol   
rate/Area]                                                      [facility]   
Phone:   
[phone]  
   
                                        Comment on above:   Average GFR for 20-2  
9 years old:  
116 mL/min/1.73sq m  
Chronic Kidney Disease:  
<60 mL/min/1.73sq m  
Kidney failure:  
<15 mL/min/1.73sq m  
  
  
eGFR calculated using average adult body mass. Additional eGFR   
calculator available at:  
  
http://www.[employer]/multiple_crcl_2012.htm  
  
  
   
   
                                                            Stage 1: Some kidney  
 damage normal GFR  
Stage 2: Mild kidney damage GFR 60-89  
Stage 3: Moderate kidney damage GFR 30-59  
Stage 4: Severe kidney damage GFR 15-29  
Stage 5: Severe kidney damage GFR <15  
ESRD - chronic treatment by dialysis or transplant  
  
  
   
   
                                                    Microscopic UrinalysisOrdere  
d By: Malika Shepherd on 2021   
   
                      -                                           [facility]   
Phone:   
[phone]  
   
                      Amorphous, UA NOT REPORTED            None       [facility]   
Phone:   
[phone]  
   
                      Bacteria, UA TRACE      Abnormal   None       [facility]   
Phone:   
[phone]  
   
                      Casts UA   NOT REPORTED            /LPF       [facility]  
Work   
Phone:   
[phone]  
   
                      Crystals, UA NOT REPORTED            None /HPF  [facility]  
Work   
Phone:   
[phone]  
   
                      Epithelial Cells UA 2 TO 5                           [facility]  
Work   
Phone:   
[phone]  
   
                                                    Interpretation and   
review of laboratory   
results         Abnormal                                        [facility]  
Work   
Phone:   
[phone]  
   
                      Mucus, UA  TRACE      Abnormal   None       [facility]  
Work   
Phone:   
[phone]  
   
                      Other Observations UA NOT REPORTED            NOT REQ.   M  
[facility]   
[employer]  
Work   
Phone:   
[phone]  
   
                      RBC, UA    0 TO 2                           [facility]  
Work   
Phone:   
[phone]  
   
                      Renal Epithelial, UA NOT REPORTED            0 /HPF     Me  
[facility]   
[employer]  
Work   
Phone:   
[phone]  
   
                      Trichomonas, UA NOT REPORTED            None       [facility]   
Phone:   
[phone]  
   
                      WBC, UA    0 TO 2                           [facility]  
Work   
Phone:   
[phone]  
   
                      Yeast, UA  NOT REPORTED            None       [facility]   
Phone:   
[phone]  
   
                                                                  [facility]  
Work   
Phone:   
[phone]  
   
                                                    No Panel InformationOrdered   
By: Malika Shepherd on 2021   
   
                                                    Interpretation and   
review of laboratory   
results         Abnormal                                        [facility]   
Phone:   
[phone]  
   
                                                                  [facility]   
Phone:   
[phone]  
   
                                                    SARS-CoV-2on 2021   
   
                                                    SARS-CoV-2 (COVID-19)   
RNA PAMELA+probe Ql (Unsp   
spec)           Not detected    Normal          [facility]  
   
                                        Comment on above:   Result Comment:  
Rapid NAAT: The specimen is NEGATIVE for SARS-CoV-2, the novel   
coronavirus  
associated with COVID-19.  
The ID NOW COVID-19 assay is designed to detect the virus that   
causes COVID-19  
in patients with signs and symptoms of infection who are   
suspected of COVID-19.  
An individual without symptoms of COVID-19 and who is not   
shedding SARS-CoV-2  
virus would expect to have a negative (not detected) result in   
this assay.  
Negative results should be treated as presumptive and, if   
inconsistent with  
clinical signs and symptoms or necessary for patient   
management,  
should be tested with an alternative molecular assay. Negative   
results do not  
preclude SARS-CoV-2 infection and  
should not be used as the sole basis for patient management   
decisions.  
Fact sheet for Healthcare Providers:   
https://www.fda.gov/media/668276/download  
Fact sheet for Patients:   
https://www.fda.gov/media/591392/download  
Methodology: Isothermal Nucleic Acid Amplification   
   
                                                            Performed By: #### C  
OVRB ####  
[facility]  
[address] [place] [address] [phone]  
: Haresh Zimmer MD   
   
                                                    Salicylateon 2021   
   
                      Salicylate <1         Low        3-10       [facility]  
   
                                        Comment on above:   Performed By: #### C  
DP, WEN, CP, HCG ####  
[address] [place] [address] [phone]  
: Haresh Zimmer MD   
   
                                                    SalicylateOrdered By: Malika matta on 2021   
   
                          Salicylate Lvl <1           Low          3 - 10   
mg/dL                                   [facility]  
Work   
Phone:   
[phone]  
   
                                                    UA w/Reflex Cultureon 2021   
   
                      Bilirubin, SemiQt,Ur Negative   Normal     NEG        [facility]  
   
                                        Comment on above:   Performed By: #### C  
OVRB ####  
[facility]  
[address] [place] [address] [place] [address] [phone]  
: Haresh Zimmer MD   
   
                      Blood, Urine Negative   Normal     NEG        [facility]  
   
                                        Comment on above:   Performed By: #### C  
OVRB ####  
[facility]  
[address] [place] [address] [phone]  
: Haresh Zimmer MD   
   
                      Clarity (U) CLEAR      Normal     CLEAR      [facility]  
   
                                        Comment on above:   Performed By: #### C  
OVRB ####  
[facility]  
[address] [place] [address] [phone]  
: Haresh Zimmer MD   
   
                      Color (U)  YELLOW     Normal     YEL        [facility]  
   
                                        Comment on above:   Performed By: #### C  
OVRB ####  
[facility]  
[address] [place], [place] [address] [phone]  
: Haresh Zimmer MD   
   
                      Glucose Ql (U) Negative   Normal     NEG        [facility]  
   
                                        Comment on above:   Performed By: #### C  
OVRB ####  
[facility]  
[address] [place], [place] [address] [phone]  
: Haresh Zimmer MD   
   
                      Ketones Ql (U) Negative   Normal     NEG        [facility]  
   
                                        Comment on above:   Performed By: #### C  
OVRB ####  
[facility]  
[address] [place], [place] [address] [phone]  
: Haresh Zimmer MD   
   
                                                    Leukocyte esterase Test   
strip Ql (U)    Negative        Normal          [facility]  
   
                                        Comment on above:   Performed By: #### C  
OVRB ####  
[facility]  
[address] [place] [address] [place] [place], [place] [address] [phone]  
: Haresh Zimmer MD   
   
                      Nitrite,Ur Negative   Normal     [facility]  
   
                                        Comment on above:   Performed By: #### C  
OVRB ####  
[facility]  
[address] [place] [address] [place], [place] [address] [phone]  
: Haresh Zimmer MD   
   
                      PH,Ur      8.5        Normal     5.0-9.0    [facility]  
   
                                        Comment on above:   Performed By: #### C  
OVRB ####  
[facility]  
[address] [place] [address] [place], [place] [address] [phone]  
: Haresh Zimmer MD   
   
                      Protein Ql (U) Negative   [facility]  
   
                                        Comment on above:   Performed By: #### C  
OVRB ####  
[facility]  
[address] [place], [place] [address] [phone]  
: Haresh Zimmer MD   
   
                      Spec. Gravity,Ur 1.015      Normal     1.010-1.020 [facility]  
   
                                        Comment on above:   Performed By: #### C  
OVRB ####  
[facility]  
[address] [place] [address] [phone]  
: Haresh Zimmer MD   
   
                      Urobilinogen,Ur Normal     Normal     NORM       [facility]  
   
                                        Comment on above:   Performed By: #### C  
OVRB ####  
[facility]  
[address] [place] [address] [phone]  
: Haresh Zimmer MD   
   
                      Comment    NOT REPORTED Normal                [facility]  
   
                                        Comment on above:   Performed By: #### C  
OVRB ####  
[facility]  
[address] [place] [address] [phone]  
: Haresh Zimmer MD   
   
                                                    Urinalysis Reflex to Culture  
Ordered By: Malika Shepherd on 2021   
   
                      Bilirubin Urine Negative              NEGATIVE   [facility]   
Phone:   
[phone]  
   
                      Color, UA  YELLOW                YELLOW     [facility]   
Phone:   
[phone]  
   
                      Glucose, Ur Negative              NEGATIVE   [facility]   
Phone:   
[phone]  
   
                      Ketones Ql (U) Negative              NEGATIVE   [facility]   
Phone:   
[phone]  
   
                                                    Leukocyte esterase Test   
strip Ql (U)    Negative                        NEGATIVE        [facility]   
Phone:   
[phone]  
   
                      Nitrite, Urine Negative              NEGATIVE   [facility]   
Phone:   
[phone]  
   
                      pH, UA     8.5                              [facility]   
[employer]   
Phone:   
[phone]  
   
                      Protein, UA Negative              NEGATIVE   [facility]   
Phone:   
[phone]  
   
                      Specific [place], UA 1.015                            [facility]  
[employer]  
Work   
Phone:   
[phone]  
   
                      Turbidity UA CLEAR                 CLEAR      [facility]  
Work   
Phone:   
[phone]  
   
                      Urinalysis Comments NOT REPORTED                       [facility]  
   
[facility]  
Work   
Phone:   
[phone]  
   
                      Urine Hgb  Negative              NEGATIVE   [facility]   
[employer]   
Phone:   
[phone]  
   
                      Urobilinogen, Urine Normal                Normal     [facility]  
   
[employer]   
Phone:   
[phone]  
   
                                                                  [facility]   
[employer]  
Work   
Phone:   
[phone]  
   
                                                    Urinalysis,Microon   
1   
   
                      -----                 Normal                [facility]  
   
                                        Comment on above:   Performed By: #### C  
OVRB ####  
[facility]  
[address] [place], [place] [address] [phone]  
: Haresh Zimmer MD   
   
                      Bacteria   TRACE      Abnormal   [facility]  
   
                                        Comment on above:   Performed By: #### C  
OVRB ####  
[facility]  
[address] [place], [place] [address] [phone]  
: Haresh Zimmer MD   
   
                                                    Epithelial cells LM Ql   
(Urine sed)     2 TO 5          Normal          0-25            [facility]  
   
                                        Comment on above:   Performed By: #### C  
OVRB ####  
[facility]  
[address] [place] [address] [phone]  
: Haresh Zimmer MD   
   
                      Mucus Strands TRACE      Abnormal   [facility]  
   
                                        Comment on above:   Performed By: #### C  
OVRB ####  
[facility]  
[address] [place] [address] [place] [address] [phone]  
: Haresh Zimmer MD   
   
                      Urine RBC's 0 TO 2     Normal     0-2        [facility]  
   
                                        Comment on above:   Performed By: #### C  
OVRB ####  
[address] [place] [address] [phone]  
: Haresh Zimmer MD   
   
                      Urine WBC's 0 TO 2     Normal     0-5        [facility]  
   
                                        Comment on above:   Performed By: #### C  
OVRB ####  
[facility]  
[address] [place] [address] [phone]  
: Haresh Zimmer MD   
   
                                                    Amorphous sediment LM   
Ql (Urine sed)  NOT REPORTED    Normal          [facility]  
   
                                        Comment on above:   Performed By: #### C  
OVRB ####  
[address] [place] [address] [phone]  
: Haresh Zimmer MD   
   
                      Casts      NOT REPORTED Normal                [facility]  
   
                                        Comment on above:   Performed By: #### C  
OVRB ####  
[facility]  
[address] [place] [address] [place] [place], [place] [address] [phone]  
: Haresh Zimmer MD   
   
                                                    Crystals LM Nom (Urine   
sed)            NOT REPORTED    Normal          [facility]  
   
                                        Comment on above:   Performed By: #### C  
OVRB ####  
[facility]  
[address] [place], [place] [address] [phone]  
: Haresh Zimmer MD   
   
                      Epithelial, Renal NOT REPORTED Normal     0          [facility]  
   
                                        Comment on above:   Performed By: #### C  
OVRB ####  
[facility]  
[address] [place], [place] [address] [phone]  
: Haresh Zimmer MD   
   
                      Other Observations NOT REPORTED Normal     NREQ       [facility]  
   
                                        Comment on above:   Performed By: #### C  
OVRB ####  
[facility]  
[address] [place], [place] [address] [phone]  
: Haresh Zimmer MD   
   
                      Trichomonas NOT REPORTED Normal     [facility]  
   
                                        Comment on above:   Performed By: #### C  
OVRB ####  
[facility]  
[address] [place], [place] [address] [phone]  
: Haresh Zimmer MD   
   
                      Yeast      NOT REPORTED Normal     [facility]  
   
                                        Comment on above:   Performed By: #### C  
OVRB ####  
[facility]  
[address] [place], [place] [address] [phone]  
: Haresh Zimmer MD   
   
                                                    Urine Drug ScreenOrdered By:  
 Malika Shepherd on 2021   
   
                      Amphetamine Screen, Ur Negative              NEGATIVE   [facility]   
[employer]  
Work   
Phone:   
[phone]  
   
                      Barbiturate Screen, Ur Negative              NEGATIVE   [facility]   
[employer]  
Work   
Phone:   
[phone]  
   
                                                    Benzodiazepine Screen,   
Urine           Negative                        NEGATIVE        [facility]   
Phone:   
[phone]  
   
                      Buprenorphine Urine Negative              NEGATIVE   [facility]   
Phone:   
[phone]  
   
                      Cannabinoid Scrn, Ur Positive   Abnormal   NEGATIVE   [facility]   
Health  
[facility]   
Phone:   
[phone]  
   
                                                    Cocaine Metabolite,   
Urine           Negative                        NEGATIVE        [facility]   
Phone:   
[phone]  
   
                                                    Interpretation and   
review of laboratory   
results         Abnormal                                        [employer]  
Work   
Phone:   
[phone]  
   
                      MDMA, Urine NOT REPORTED            NEGATIVE   [facility]  
Work   
Phone:   
[phone]  
   
                      Methadone Screen, Urine Negative              NEGATIVE   M  
[facility]   
[employer]  
Work   
Phone:   
[phone]  
   
                      Methamphetamine, Urine Negative              NEGATIVE   [facility]  
y   
[employer]  
Work   
Phone:   
[phone]  
   
                      Opiates, Urine Negative              NEGATIVE   [facility]   
[employer]  
Work   
Phone:   
[phone]  
   
                      Oxycodone Screen, Ur Negative              NEGATIVE   [facility]  
y   
Health  
Work   
Phone:   
[phone]  
   
                      Phencyclidine, Urine Negative              NEGATIVE   [facility]  
y   
Health  
Work   
Phone:   
[phone]  
   
                      Propoxyphene, Urine Negative              NEGATIVE   [facility]  
   
[employer]  
Work   
Phone:   
[phone]  
   
                      Test Information NOT REPORTED                       [facility]   
Phone:   
[phone]  
   
                                                    Tricyclic   
Antidepressants, Urine Negative                        NEGATIVE        [facility]   
[employer]  
Work   
Phone:   
[phone]  
   
                                        Comment on above:   Drug screen results   
are to be used for medical purposes only.   
All positive results are  
unconfirmed. Testing for employment or legal uses should be   
sent to a reference laboratory  
for confirmation.  
  
   
   
                                                                  [facility]   
Phone:   
[phone]  
   
                                                    Laboratory - Microbiology an  
d Antimicrobial susceptibilityOrdered By: Doreen Cabrera on   
2021   
   
                                                    SARS-CoV-2 (COVID-19)   
RNA PAMELA+probe Ql (Resp) Not detected                            (Not   
Detected )                              Health   
[facility]  
Work   
Phone:   
[phone]  
   
                                        Comment on above:   Note: This nucleic a  
mallorie amplification test was developed and   
itsperformance characteristics determined by   
[facility]. Nucleic acid amplification tests include   
RT-PCR and TMA. This test has not been FDA cleared orapproved.   
This test has been authorized by FDA under anEmergency Use   
Authorization (EUA). This test is onlyauthorized for the   
duration of time the declaration thatcircumstances exist   
justifying the authorization of theemergency use of in vitro   
diagnostic tests for detection oaASHF-HcG-2 virus and/or   
diagnosis of COVID-19 infectionunder section 564(b)(1) of the   
Act, 21 U.S.C. 360bbb-3(b)(1), unless the authorization is   
terminated or revokedsooner.When diagnostic testing is   
negative, the possibility of afalse negative result should be   
considered in the contextof a patient's recent exposures and   
the presence ofclinical signs and symptoms consistent with   
COVID-19. Anindividual without symptoms of COVID-19 and who is   
notshedding SARS-CoV-2 virus would expect to have a   
negative(not detected) result in this assay.   
   
                                                    SARS-CoV-2 (COVID-19)   
RNA PAMELA+probe Ql (Unsp   
spec)           Performed                                       Health   
[facility]  
Work   
Phone:   
[phone]  
   
                                                    Vitamin Don 2021   
   
                      Vitamin D  14.7 ng/mL Low        30.0-100.0 [facility]  
   
                                        Comment on above:   Result Comment: (<20  
 ng/mL) Deficiency  
This assay accurately quantifies the sum of vitamin D3,   
25-Hydroxy and  
vitamin D2, 25-Hyroxy.   
   
                                                            Performed By: #### V  
ITD ####  
[facility]  
[address] [place] [address]  
[phone]   
   
                                                    CBC With Platelet No Differe  
ntialon 2021   
   
                                                    Erythrocyte   
distribution width   
(RBC) [Ratio]   13.6 %          Normal          11.5-14.5       [facility]  
   
                                        Comment on above:   Performed By: #### C  
BCND ####  
[facility]  
[address] [place] [address]  
[phone]   
   
                                                    Hematocrit (Bld)   
[Volume fraction] 43.5 %          Normal          37.0-47.0       [facility]  
   
                                        Comment on above:   Performed By: #### C  
BCND ####  
[facility]  
[address] [place] [address]  
[phone]   
   
                                                    Hemoglobin (Bld)   
[Mass/Vol]      14.6 g/dL       Normal          12.0-16.0       [facility]  
   
                                        Comment on above:   Performed By: #### C  
BCND ####  
[facility]  
[address]  
[place] [address]  
[phone]   
   
                                                    MCH (RBC) [Entitic   
mass]           28.6 pg         Normal          27.0-31.3       [facility]  
   
                                        Comment on above:   Performed By: #### C  
BCND ####  
[facility]  
[address] [place] [address]  
[phone]   
   
                      MCHC       33.5 %     Normal     33.0-37.0  [facility]  
   
                                        Comment on above:   Performed By: #### C  
BCND ####  
[facility]  
[address] [place] [address]  
[phone]   
   
                      MCV (RBC) [Entitic vol] 85.4 fL    Normal     82.0-100.0 M  
[facility]  
   
                                        Comment on above:   Performed By: #### C  
BCND ####  
[facility]  
[address] [place] [address]  
[phone]   
   
                      Platelets (Bld) [#/Vol] 284 10*3/uL Normal     130-400      
[facility]  
   
                                        Comment on above:   Performed By: #### C  
BCND ####  
[facility]  
[address] [place] [address]  
[phone]   
   
                      RBC (Bld) [#/Vol] 5.10 10*6/uL Normal     4.20-5.40  [facility]  
   
                                        Comment on above:   Performed By: #### C  
BCND ####  
[facility]  
[address] [place] [address]  
[phone]   
   
                      WBC (Bld) [#/Vol] 11.1 10*3/uL Critically high 4.8-10.8     
[facility]  
   
                                        Comment on above:   Performed By: #### C  
BCND ####  
[facility]  
[address] [place] [address]  
[phone]   
   
                                                    Folateon 2021   
   
                      Folate     15.3 ng/mL Normal     7.3-26.1   [facility]  
   
                                        Comment on above:   Result Comment: As o  
f 8/10/16, the methodology has changed.   
Results from  
this methodology should not be compared with results from  
previous methodology.   
   
                                                            Performed By: #### F  
OLAT ####  
[facility]  
[address] [place] [address]  
[phone]   
   
                                                    Lipid Panelon 2021   
   
                      Cholesterol [Mass/Vol] 127 mg/dL  Normal     0-199      [facility]  
   
                                        Comment on above:   Result Comment: ATP   
III Cholesterol classification is   
Desirable.   
   
                                                            Performed By: #### L  
IPID ####  
[facility]  
[address] [place] [address]  
[phone]   
   
                                                    Cholesterol in HDL   
[Mass/Vol]      32 mg/dL        Low             40-59           [facility]  
   
                                        Comment on above:   Result Comment: ATP   
III HDL Cholestrol Classification is low.  
Expected Values:  
Males: >55 = No Risk  
35-55 = Moderate Risk  
<35 = High Risk  
Females: >65 = No Risk  
45-65 = Moderate Risk  
<45 = High Risk  
NCEP Guidelines: Third Report May 2001  
>59 = negative risk factor for CHD  
<40 = major risk factor for CHD   
   
                                                            Performed By: #### L  
IPID ####  
[facility]  
[address] [place] [address]  
[phone]   
   
                                                    Cholesterol in LDL   
[Mass/Vol]      75 mg/dL        Normal          0-129           [facility]  
   
                                        Comment on above:   Result Comment: ATP   
III LDL Classification is Optimal.   
   
                                                            Performed By: #### L  
IPID ####  
[facility]  
[address] [place] [address]  
[phone]   
   
                      Triglyceride [Mass/Vol] 100 mg/dL  Normal     0-150      M  
[facility]  
   
                                        Comment on above:   Result Comment: ATP   
III Triglycerides Classification is   
Normal.   
   
                                                            Performed By: #### L  
IPID ####  
[facility]  
[address] [place] [address]  
[phone]   
   
                                                    TSH w/out Reflexon    
   
                      TSH w/out Reflex 1.020 uIU/mL Normal     0.440-3.86 [facility]  
   
                                        Comment on above:   Performed By: #### T  
SH ####  
[facility]  
[address] [place] [address]  
[phone]   
   
                                                    Vitamin B12on 2021   
   
                                                    Cobalamin (Vitamin B12)   
[Mass/Vol]      1050 pg/mL      Normal          232-1245        [facility]  
   
                                        Comment on above:   Performed By: #### B  
12 ####  
[facility]  
[address] [place] [address]  
[phone]   
   
                                                    EKG 12 LeadOrdered By: Cuca Gallagher on 2021   
   
                      Atrial Rate 75                    BPM        [facility]   
[employer]   
Phone:   
[phone]  
   
                      P Axis     24                    degrees    [facility]   
Phone:   
[phone]  
   
                      P-R Interval 140 ms                           [facility]   
Phone:   
[phone]  
   
                      Q-T Interval 384 ms                           [facility]   
Phone:   
[phone]  
   
                      QRS Duration 90 ms                            [facility]   
Phone:   
[phone]  
   
                                                    QTc Calculation   
(Bazett)        414 ms                                          [facility]   
Phone:   
[phone]  
   
                      R Axis     29                    degrees    [facility]   
Phone:   
[phone]  
   
                      T Axis     20                    degrees    [facility]   
Phone:   
[phone]  
   
                      Ventricular Rate 70                    BPM        [facility]   
Phone:   
[phone]  
   
                                                            Normal sinus rhythm   
with   
sinus arrhythmia  
Possible Anterior infarct   
, age undetermined  
Abnormal ECG  
No previous ECGs   
available  
Confirmed by KRANTHI SU ([id number]) on   
2021 12:40:27 AM  
                                                            [facility]   
Phone:   
[phone]  
   
                                                            [place], [facility] Incoming E  
kg   
Results From [facility] [place] -   
2021 12:40 AM EDT   
Formatting of this note   
might be different from   
the original.  
Normal sinus rhythm with   
sinus arrhythmia  
Possible Anterior infarct   
, age undetermined  
Abnormal ECG  
No previous ECGs   
available  
Confirmed by KRANTHI SU ([id number]) on   
2021 12:40:27 AM                                         [facility]   
Phone:   
[phone]  
   
                                                                  [facility]   
Phone:   
[phone]  
   
                                                    Acetaminophenon 2021   
   
                                                    Acetaminophen   
[Mass/Vol]      ug/mL           Low             10-30           [facility]  
   
                                        Comment on above:   Performed By: #### A  
LCB, ACET ####  
[facility]  
[address] [place] [address] [phone]  
: Haresh Zimmer MD   
   
                                                    Acetaminophen LevelOrdered B  
y: Rafael Gallagher on 2021   
   
                          Acetaminophen Level <5           Low          10 - 30   
ug/mL                                   [facility]   
Phone:   
[phone]  
   
                                                    Interpretation and   
review of laboratory   
results         Abnormal                                        [facility]   
Phone:   
[phone]  
   
                                                                  [facility]   
Phone:   
[phone]  
   
                                                    CBC Auto DifferentialOrdered  
 By: Rafael Gallagher on 2021   
   
                      Absolute Eos # 0.07                             [facility]   
Phone:   
[phone]  
   
                                                    Absolute Immature   
Granulocyte     0.03                                            [facility]   
Phone:   
[phone]  
   
                      Absolute Lymph # 2.20                             [facility]   
Phone:   
[phone]  
   
                      Absolute Mono # 0.76                             [facility]   
Phone:   
[phone]  
   
                      Basophils (Bld) [#/Vol] 0.05 10*3/uL                        
 [facility]   
Phone:   
[phone]  
   
                      Basophils/100 WBC (Bld) 0 %                   0 - 2 %    M  
[facility]   
Phone:   
[phone]  
   
                      Differential Type NOT REPORTED                       [facility]   
Phone:   
[phone]  
   
                                                    Eosinophils/100 WBC   
(Bld)           1 %                             1 - 4 %         [facility]   
Phone:   
[phone]  
   
                                                    Hematocrit (Bld)   
[Volume fraction]   45.2 %                                  36.3 - 47.1   
%                                       [facility]   
Phone:   
[phone]  
   
                                                    Hemoglobin.gastrointest  
inal spec 1 Ql (Stl) 15.0 g/dL                               11.9 - 15.1   
g/dL                                    [facility]   
Phone:   
[phone]  
   
                                                    Immature   
granulocytes/100 WBC   
(Bld)           0 %                             0               [facility]   
Phone:   
[phone]  
   
                                                    Interpretation and   
review of laboratory   
results         Abnormal                                        [facility]   
Phone:   
[phone]  
   
                                                    Lymphocytes/100 WBC   
(Bld)           18 %            Low             24 - 43 %       [facility]   
Phone:   
[phone]  
   
                                                    MCH (RBC) [Entitic   
mass]               28.6 pg                                 25.2 - 33.5   
pg                                      [facility]   
Phone:   
[phone]  
   
                          MCHC (RBC) [Mass/Vol] 33.2 g/dL                 28.4 -  
 34.8   
g/dL                                    [facility]   
Phone:   
[phone]  
   
                          MCV (RBC) [Entitic vol] 86.3 fL                   82.6  
 -   
102.9 fL                                [facility]   
Phone:   
[phone]  
   
                      Monocytes/100 WBC (Bld) 6 %                   3 - 12 %   M  
[facility]   
Phone:   
[phone]  
   
                          NRBC Automated 0.0                       0.0 per 100   
WBC                                     [facility]   
Phone:   
[phone]  
   
                                                    Platelet distribution   
width (Bld) [Ratio] 13.0 %                                  11.8 - 14.4   
%                                       [facility]   
Phone:   
[phone]  
   
                      Platelet Estimate NOT REPORTED                       [facility]   
Phone:   
[phone]  
   
                                                    Platelet mean volume   
(Bld) [Entitic vol] 9.1 fL                                  8.1 - 13.5   
fL                                      [facility]   
Phone:   
[phone]  
   
                      Platelets (Bld) [#/Vol] 296 10*3/uL                         
[facility]   
Phone:   
[phone]  
   
                          RBC (Bld) [#/Vol] 5.24 10*6/uL High         3.95 - 5.1  
1   
m/uL                                    [facility]   
Phone:   
[phone]  
   
                      RBC (Bld) [#/Vol] NOT REPORTED                       [facility]   
Phone:   
[phone]  
   
                                                    Segmented   
neutrophils/100 WBC   
(Bld)           75 %            High            36 - 65 %       [facility]   
Phone:   
[phone]  
   
                      Segs Absolute 9.07       High                  [facility]   
Phone:   
[phone]  
   
                      WBC (Bld) [#/Vol] 12.2 10*3/uL High                  [facility]   
Phone:   
[phone]  
   
                      WBC (Bld) [#/Vol] NOT REPORTED                       [facility]   
Phone:   
[phone]  
   
                                                                  [facility]   
Phone:   
[phone]  
   
                                                    CBC with Diffon 2021   
   
                      Abs. Basophil 0.05 k/uL  Normal     0.00-0.20  [facility]  
   
                                        Comment on above:   Performed By: #### C  
OVRB ####  
[facility]  
[address] [place] [address] [phone]  
: Haresh Zimmer MD   
   
                      Abs.Imm.Granulocyte 0.03 k/uL  Normal     0.00-0.30  [facility]  
   
                                        Comment on above:   Performed By: #### C  
OVRB ####  
[facility]  
[address] [place], [place] [address]  
[phone]  
: Haresh Zimmer MD   
   
                      Abs.Neutrophil (Seg) 9.07 k/uL  High       1.50-8.10  [facility]  
   
                                        Comment on above:   Performed By: #### C  
OVRB ####  
[address] [place], [place] [address]  
[phone]  
: Haresh Zimmer MD   
   
                      Basophils/100 WBC (Bld) 0 %        Normal     0-2        M  
[facility]  
   
                                        Comment on above:   Performed By: #### C  
OVRB ####  
[address] [place] [address]  
[phone]  
: Haresh Zimmer MD   
   
                                                    Eosinophils (Bld)   
[#/Vol]         0.07 10*3/uL    Normal          0.00-0.44       [facility]  
   
                                        Comment on above:   Performed By: #### C  
OVRB ####  
[address] [place], [place] [address]  
[phone]  
: Haresh Zimmer MD   
   
                                                    Eosinophils/100 WBC   
(Bld)           1 %             Normal          1-4             [facility]  
   
                                        Comment on above:   Performed By: #### C  
OVRB ####  
[address] [place], [place] [address]  
[phone]  
: Haresh Zimmer MD   
   
                                                    Erythrocyte   
distribution width   
(RBC) [Ratio]   13.0 %          Normal          11.8-14.4       [facility]  
   
                                        Comment on above:   Performed By: #### C  
OVRB ####  
[address] [place] [id number] [phone]  
: Haresh Zimmer MD   
   
                                                    Hematocrit (Bld)   
[Volume fraction] 45.2 %          Normal          36.3-47.1       [facility] [facility]  
   
                                        Comment on above:   Performed By: #### C  
OVRB ####  
[address] [place] [address] [phone]  
: Haresh Zimmer MD   
   
                                                    Hemoglobin (Bld)   
[Mass/Vol]      15.0 g/dL       Normal          11.9-15.1       [facility]  
   
                                        Comment on above:   Performed By: #### C  
OVRB ####  
[facility]  
[address] [place], [place] [address] [phone]  
: Haresh Zimmer MD   
   
                                                    Immature   
granulocytes/100 WBC   
(Bld)           0 %             Normal          0               [facility]  
   
                                        Comment on above:   Performed By: #### C  
OVRB ####  
[facility]  
[address] [place], [place] [address]83 [phone]  
: Haresh Zimmer MD   
   
                                                    Lymphocytes (Bld)   
[#/Vol]         2.20 10*3/uL    Normal          1.10-3.70       [facility]  
   
                                        Comment on above:   Performed By: #### C  
OVRB ####  
[address] [place], [facility]83 [phone]  
: Haresh Zimmer MD   
   
                                                    Lymphocytes/100 WBC   
(Bld)           18 %            Low             24-43           [facility]  
   
                                        Comment on above:   Performed By: #### C  
OVRB ####  
[address] [place], [place] [address]683 [phone]  
: Haresh Zimmer MD   
   
                                                    MCH (RBC) [Entitic   
mass]           28.6 pg         Normal          25.2-33.5       [facility]  
   
                                        Comment on above:   Performed By: #### C  
OVRB ####  
[facility]  
[address] [place] [address] [address] [place] [id number] [phone]  
: Haresh Zimmer MD   
   
                      MCHC (RBC) [Mass/Vol] 33.2 g/dL  Normal     28.4-34.8  [facility]  
   
                                        Comment on above:   Performed By: #### C  
OVRB ####  
[facility]  
[address] [place] [address] [phone]  
: Haresh Zimmer MD   
   
                      MCV (RBC) [Entitic vol] 86.3 fL    Normal     82.6-102.9 [facility]  
   
                                        Comment on above:   Performed By: #### C  
OVRB ####  
[facility]  
[address] [place] [address] [phone]  
: Haresh Zimmer MD   
   
                      Monocytes (Bld) [#/Vol] 0.76 10*3/uL Normal     0.10-1.20   
 [facility]  
   
                                        Comment on above:   Performed By: #### C  
OVRB ####  
[facility]  
[address] [place], [place] [address] [phone]  
: Haresh Zimmer MD   
   
                      Monocytes/100 WBC (Bld) 6 %        Normal     3-12       [facility]  
   
                                        Comment on above:   Performed By: #### C  
OVRB ####  
[address] [place], [place] [address]383 [phone]  
: Haresh Zimmer MD   
   
                      Neutrophil (Seg) 75 %       High       36-65      [facility]  
   
                                        Comment on above:   Performed By: #### C  
OVRB ####  
[address] [place], [place] [address]283 [phone]  
: Haresh Zimmer MD   
   
                      NRBC Automated 0.0 per 100 WBC Normal     0.0        [facility]  
   
                                        Comment on above:   Performed By: #### C  
OVRB ####  
[address] [place], [facility]83 [phone]  
: Haresh Zimmer MD   
   
                                                    Platelet mean volume   
(Bld) [Entitic vol] 9.1 fL          Normal          8.1-13.5        [facility]  
   
                                        Comment on above:   Performed By: #### C  
OVRB ####  
[address] [place] [address] [phone]  
: Haresh Zimmer MD   
   
                      Platelets (Bld) [#/Vol] 296 10*3/uL Normal     138-453      
[facility]  
   
                                        Comment on above:   Performed By: #### C  
OVRB ####  
[facility]  
[address] [place] [address] [address] [place], [place] [address]83 [phone]  
: Haresh Zmimer MD   
   
                      RBC (Bld) [#/Vol] 5.24 10*6/uL High       3.95-5.11  [facility]  
   
                                        Comment on above:   Performed By: #### C  
OVRB ####  
[address] [place], [place] [address]83 [phone]  
: Haresh Zimmer MD   
   
                      WBC (Bld) [#/Vol] 12.2 10*3/uL High       3.5-11.3   [facility]  
   
                                        Comment on above:   Performed By: #### C  
OVRB ####  
[address] [place], [place] [address]483 [phone]  
: Haresh Zimmer MD   
   
                      Auto Diff Performed NOT REPORTED Normal                [facility]  
   
                                        Comment on above:   Performed By: #### C  
OVRB ####  
[address] [place], [facility]83 [phone]  
: Haresh Zimmer MD   
   
                      Platelet Estimate NOT REPORTED Normal                [facility]  
   
                                        Comment on above:   Performed By: #### C  
OVRB ####  
[address] [place], [facility]83 [phone]  
: Haresh Zimmer MD   
   
                                                    RBC morphology finding   
Nom (d)       NOT REPORTED    Normal                          [facility]  
   
                                        Comment on above:   Performed By: #### C  
OVRB ####  
[address] [place], [facility]83 [phone]  
: Haresh Zimmer MD   
   
                      WBC Morphology NOT REPORTED Normal                [facility]  
   
                                        Comment on above:   Performed By: #### C  
OVRB ####  
[address] [place] [address] [phone]  
: Haresh Zimmer MD   
   
                                                    COVID-19, RapidOrdered By: PIPO Gallagher on 2021   
   
                                                    SARS-CoV-2 (COVID-19)   
RNA PAMELA+probe Ql (Unsp   
spec)               Not detected                            Not   
Detected                                [facility]   
Phone:   
[phone]  
   
                                        Comment on above:     
Rapid NAAT: The specimen is NEGATIVE for SARS-CoV-2, the novel   
coronavirus associated with  
COVID-19.  
  
The ID NOW COVID-19 assay is designed to detect the virus that   
causes COVID-19 in patients  
with signs and symptoms of infection who are suspected of   
COVID-19.  
An individual without symptoms of COVID-19 and who is not   
shedding SARS-CoV-2 virus would  
expect to have a negative (not detected) result in this assay.  
Negative results should be treated as presumptive and, if   
inconsistent with clinical signs  
and symptoms or necessary for patient management,  
should be tested with an alternative molecular assay. Negative   
results do not preclude  
SARS-CoV-2 infection and  
should not be used as the sole basis for patient management   
decisions.  
  
Fact sheet for Healthcare Providers:   
https://www.fda.gov/media/618554/download  
Fact sheet for Patients:   
https://www.fda.gov/media/961580/download  
  
Methodology: Isothermal Nucleic Acid Amplification  
  
   
   
                      Specimen Description .NASOPHARYNGEAL SWAB                   
      [facility]   
Phone:   
[phone]  
   
                                                                  [facility]   
Phone:   
[phone]  
   
                                                    Comp Metabolic Profon 2021   
   
                      (cont.)               Normal                [facility]  
   
                                        Comment on above:   Result Comment: Aver  
age GFR for 20-29 years old:  
116 mL/min/1.73sq m  
Chronic Kidney Disease:  
<60 mL/min/1.73sq m  
Kidney failure:  
<15 mL/min/1.73sq m  
eGFR calculated using average adult body mass. Additional eGFR   
calculator  
available at:  
http://www.[employer]/multiple_crcl_2012.htm   
   
                                                            Performed By: #### D  
AU ####  
[facility]  
[address] [place] [address] [phone]  
: Haresh Zimmer MD   
   
                      Albumin [Mass/Vol] 4.5 g/dL   Normal     3.5-5.2    [facility]  
   
                                        Comment on above:   Performed By: #### D  
AU ####  
[facility]  
[address] [place] [address] [phone]  
: Haresh Zimmer MD   
   
                      Albumin/Glob Ratio 1.6        Normal     1.0-2.5    [facility]  
   
                                        Comment on above:   Performed By: #### D  
AU ####  
[facility]  
[address] [place], [place] [address] [phone]  
: Haresh Zimmer MD   
   
                      Alkaline Phos 72 U/L     Normal          [facility]  
   
                                        Comment on above:   Performed By: #### D  
AU ####  
[facility]  
[address] [place], [place] [address]483 [phone]  
: Haresh Zimmer MD   
   
                                                    ALT [Catalytic   
activity/Vol]   71 U/L          High            5-33            [facility]  
   
                                        Comment on above:   Performed By: #### D  
AU ####  
[facility]  
[address] [place], [place] [address] [phone]  
: Haresh Zimmer MD   
   
                      Anion gap [Moles/Vol] 10 mmol/L  Normal     9-17       [facility]  
   
                                        Comment on above:   Performed By: #### D  
AU ####  
[facility]  
[address] [place], [place] [address] [phone]  
: Haresh Zimmer MD   
   
                                                    AST [Catalytic   
activity/Vol]   35 U/L          High            <32             [facility]  
   
                                        Comment on above:   Performed By: #### D  
AU ####  
[facility]  
[address] [place], [place] [address]483 [phone]  
: Haresh Zimmer MD   
   
                      Bilirubin [Mass/Vol] 0.34 mg/dL Normal     0.3-1.2    [facility]  
   
                                        Comment on above:   Performed By: #### D  
AU ####  
[facility]  
[address] [place], [place] [address] [phone]  
: Haresh Zimmer MD   
   
                      BUN/CRE Ratio 14         Normal     9-20       [facility]  
   
                                        Comment on above:   Performed By: #### D  
AU ####  
[facility]  
[address] [place], [place] [address] [phone]  
: Haresh Zimmer MD   
   
                      Calcium [Mass/Vol] 10.3 mg/dL Normal     8.6-10.4   [facility]  
   
                                        Comment on above:   Performed By: #### D  
AU ####  
[facility]  
[address] [place], [place] [address]83 [phone]  
: Haresh Zimmer MD   
   
                      Chloride [Moles/Vol] 103 mmol/L Normal          [facility]  
   
                                        Comment on above:   Performed By: #### D  
AU ####  
[facility]  
[address] [place], [place] [address]383 [phone]  
: Haresh Zimmer MD   
   
                      CO2 [Moles/Vol] 23 mmol/L  Normal     20-31      [facility]  
   
                                        Comment on above:   Performed By: #### D  
AU ####  
[facility]  
[address] [place], [place] [address]83 [phone]  
: Haresh Zimmer MD   
   
                      Creatinine [Mass/Vol] 0.65 mg/dL Normal     0.50-0.90  [facility]  
   
                                        Comment on above:   Performed By: #### D  
AU ####  
[facility]  
[address] [place], [place] [address] [phone]  
: Haresh Zimmer MD   
   
                      GFR, Amer >60        Normal     >60        [facility]  
   
                                        Comment on above:   Performed By: #### D  
AU ####  
[facility]  
[address] [place], [place] [address] [phone]  
: Haresh Zimmer MD   
   
                      GFR,non  Amer >60        Normal     >60        [facility]  
   
                                        Comment on above:   Performed By: #### D  
AU ####  
[facility]  
[address] [place], [place] 447[address] [phone]  
: Haresh Zimmer MD   
   
                      Glucose [Mass/Vol] 84 mg/dL   Normal     70-99      [facility]  
   
                                        Comment on above:   Performed By: #### D  
AU ####  
[facility]  
[address] [place], [place] [address] [phone]  
: Haresh Zimmer MD   
   
                      Potassium [Moles/Vol] 4.2 mmol/L Normal     3.7-5.3    [facility]  
   
                                        Comment on above:   Performed By: #### D  
AU ####  
[facility]  
[address] [place] [address] [place] [place], [place] [address] [phone]  
: Haresh Zimmer MD   
   
                      Protein [Mass/Vol] 7.4 g/dL   Normal     6.4-8.3    [facility]  
   
                                        Comment on above:   Performed By: #### D  
AU ####  
[facility]  
[address] [place], [place] [address] [phone]  
: Haresh Zimmer MD   
   
                      Sodium [Moles/Vol] 136 mmol/L Normal     135-144    [facility]  
   
                                        Comment on above:   Performed By: #### D  
AU ####  
[facility]  
[address] [place] [address] [phone]  
: Haresh Zimmer MD   
   
                      Staging:              Normal                [facility]  
   
                                        Comment on above:   Result Comment: Stag  
e 1: Some kidney damage normal GFR  
Stage 2: Mild kidney damage GFR 60-89  
Stage 3: Moderate kidney damage GFR 30-59  
Stage 4: Severe kidney damage GFR 15-29  
Stage 5: Severe kidney damage GFR <15  
ESRD - chronic treatment by dialysis or transplant   
   
                                                            Performed By: #### D  
AU ####  
[facility]  
[address] [place] [address] [place] [place], [place] [address] [phone]  
: Haresh Zimmer MD   
   
                                                    Urea nitrogen   
[Mass/Vol]      9 mg/dL         Normal          6-20            [facility]  
   
                                        Comment on above:   Performed By: #### D  
AU ####  
[facility]  
[address] [place] [address] [phone]  
: Haresh Zimmer MD   
   
                                                    Comprehensive Metabolic Pane  
lOrdered By: Rafael Gallagher on 2021   
   
                          Albumin [Mass/Vol] 4.5 g/dL                  3.5 - 5.2  
   
g/dL                                    [facility]  
Work   
Phone:   
[phone]  
   
                                                    Albumin/Globulin [Mass   
ratio]          1.6 {ratio}                                     [facility]   
Phone:   
[phone]  
   
                                                    ALP (Bld) [Catalytic   
activity/Vol]       72 U/L                                  35 - 104   
U/L                                     [facility]   
Phone:   
[phone]  
   
                                                    ALT [Catalytic   
activity/Vol]   71 U/L          High            5 - 33 U/L      [facility]   
Phone:   
[phone]  
   
                          Anion gap [Moles/Vol] 10 mmol/L                 9 - 17  
   
mmol/L                                  [facility]   
Phone:   
[phone]  
   
                                                    AST [Catalytic   
activity/Vol]   35 U/L          High            <32             [facility]   
Phone:   
[phone]  
   
                          Bilirubin [Mass/Vol] 0.34 mg/dL                0.3 - 1  
.2   
mg/dL                                   [facility]   
Phone:   
[phone]  
   
                          Calcium [Mass/Vol] 10.3 mg/dL                8.6 - 10.  
4   
mg/dL                                   [facility]   
Phone:   
[phone]  
   
                          Chloride [Moles/Vol] 103 mmol/L                98 - 10  
7   
mmol/L                                  [facility]   
Phone:   
[phone]  
   
                          CO2 [Moles/Vol] 23 mmol/L                 20 - 31   
mmol/L                                  [facility]   
Phone:   
[phone]  
   
                          Creatinine [Mass/Vol] 0.65 mg/dL                0.50 -  
 0.90   
mg/dL                                   [facility]   
Phone:   
[phone]  
   
                                                    Free PSA/Total PSA   
[Mass fraction]     7.4 g/dL                                6.4 - 8.3   
g/dL                                    [facility]   
Phone:   
[phone]  
   
                      GFR  >60                   >60 mL/min [facility]  
y   
Health  
Work   
Phone:   
[phone]  
   
                                                    GFR Non-   
American        >60                             >60 mL/min      [facility]   
Phone:   
[phone]  
   
                          Glucose [Mass/Vol] 84 mg/dL                  70 - 99   
mg/dL                                   [facility]   
Phone:   
[phone]  
   
                          Potassium [Moles/Vol] 4.2 mmol/L                3.7 -   
5.3   
mmol/L                                  [facility]   
Phone:   
[phone]  
   
                          Sodium [Moles/Vol] 136 mmol/L                135 - 144  
   
mmol/L                                  [facility]  
[place]   
Phone:   
[phone]  
   
                                                    Urea nitrogen (BldV)   
[Mass/Vol]          9 mg/dL                                 6 - 20   
mg/dL                                   [facility]  
[place]   
Phone:   
[phone]  
   
                                                    Urea   
nitrogen/Creatinine   
(Bld) [Mass ratio] 14                                              [facility]  
[place]   
Phone:   
[phone]  
   
                                                    Drug Scr, Abuse, Uron 2021   
   
                      Amphetamine(s),Ur Negative   Normal     NEG        [facility]  
   
                                        Comment on above:   Performed By: #### C  
OVRB ####  
[facility]  
[address] [place] [address] [phone]  
: Haresh Zimmer MD   
   
                      Barbiturate(s),Ur Negative   Normal     NEG        [facility]  
   
                                        Comment on above:   Performed By: #### C  
OVRB ####  
[facility]  
[address] [place] [address] [phone]  
: Haresh Zimmer MD   
   
                      Benzodiazepine(s) Negative   Normal     [facility]  
   
                                        Comment on above:   Performed By: #### C  
OVRB ####  
[facility]  
[address] [place] [address] [phone]  
: Haresh Zimmer MD   
   
                      Buprenorphrine, Ur Negative   Normal     [facility]  
   
                                        Comment on above:   Performed By: #### C  
OVRB ####  
[facility]  
[address] [place], [place] [address] [phone]  
: Haresh Zimmer MD   
   
                      Cannabinoid(s),Ur Positive   Abnormal   NEG        [facility]  
   
                                        Comment on above:   Performed By: #### C  
OVRB ####  
[facility]  
[address] [place] [address] [phone]  
: Haresh Zimmer MD   
   
                      Cocaine Metabolite Negative   Normal     [facility]  
   
                                        Comment on above:   Performed By: #### C  
OVRB ####  
[facility]  
[address] [place] [address] [phone]  
: Haresh Zimmer MD   
   
                      Methadone Ql (U) Negative   Normal     NEG        [facility]  
   
                                        Comment on above:   Performed By: #### C  
OVRB ####  
[facility]  
[address] [place] [address] [phone]  
: Haresh Zimmer MD   
   
                      Methamphetamine, Ur Negative   Normal     NEG        [facility]  
   
                                        Comment on above:   Performed By: #### C  
OVRB ####  
[facility]  
[address] [place] [address] [phone]  
: Haresh Zimmer MD   
   
                      Opiate(s), Ur Negative   Normal     NEG        [facility]  
   
                                        Comment on above:   Performed By: #### C  
OVRB ####  
[facility]  
[address] [place] [address] [phone]  
: Haresh Zimmer MD   
   
                      Oxycodone, Urine Negative   Normal     [facility]  
   
                                        Comment on above:   Performed By: #### C  
OVRB ####  
[facility]  
[address] [place], [place] [address] [phone]  
: Haresh Zimmer MD   
   
                      Phencyclidine, Ur Negative   Normal     [facility]  
   
                                        Comment on above:   Performed By: #### C  
OVRB ####  
[facility]  
[address] [place] [address] [place] [place], [place] [address] [phone]  
: Haresh Zimmer MD   
   
                      Propoxyphene,Urine Negative   Normal     [facility]  
   
                                        Comment on above:   Performed By: #### C  
OVRB ####  
[facility]  
[address] [place], [place] [address] [phone]  
: Haresh Zimmer MD   
   
                                                    Tricyclic   
antidepressants Screen   
Ql (U)          Negative        Normal          [facility]  
   
                                        Comment on above:   Result Comment: Drug  
 screen results are to be used for medical  
  
purposes only. All positive  
results are unconfirmed. Testing for employment or legal uses   
should be sent  
to a reference laboratory for confirmation.   
   
                                                            Performed By: #### C  
OVRB ####  
[facility]  
[address] [place] [address] [phone]  
: Haresh Zimmer MD   
   
                      Interpretive Info NOT REPORTED Normal                [facility]  
   
                                        Comment on above:   Performed By: #### C  
OVRB ####  
[facility]  
[address] [place] [address] [place] [address] [phone]  
: Haresh Zimmer MD   
   
                                                    Drug Scr, Abuse, UrOrdered B  
y: Rafael Gallagher on 2021   
   
                      MDMA, Urine NOT REPORTED Normal     NEG        [facility]   
Phone:   
[phone]  
   
                                        Comment on above:   Performed By: #### C  
OVRB ####  
[address] [place] [address] [phone]  
: Haresh Zimmer MD   
   
                                                    EthanolOrdered By: Rafael valdivia on 2021   
   
                      Ethanol [Mass/Vol] mg/dL                 <10 mg/dL  [facility]   
Phone:   
[phone]  
   
                      Ethanol percent <0.010                <0.010 %   [facility]   
[employer]   
Phone:   
[phone]  
   
                                                                  [facility]   
Phone:   
[phone]  
   
                                                    Ethanol Alcoholon 2021  
   
   
                      Ethanol [Mass/Vol] mg/dL      Normal     <10        [facility]  
   
                                        Comment on above:   Performed By: #### A  
LCB, ACET ####  
[address] [place] [address] [phone]  
: Haresh Zimmer MD   
   
                      Ethanol percent <0.010     Normal     <0.010     [facility]  
   
                                        Comment on above:   Performed By: #### A  
[facility], ACET ####  
[address] [place] [address] [phone]  
: Haresh Zimmer MD   
   
                                                    HCG Qualitative, SerumOrdere  
d By: Rafael Gallagher on 2021   
   
                      hCG Qual   Negative              NEGATIVE   [facility]   
Phone:   
[phone]  
   
                                        Comment on above:   Specimens with hCG l  
evels near the threshold of the test (25   
mIU/mL) may give a negative or  
indeterminate result. In such cases, another test should be   
performed with a new specimen  
in 48-72 hours. If early pregnancy is suspected clinically in   
this setting, correlation  
with quantitative serum b-hCG level is suggested.  
  
[employer] has confirmed the use of plasma for this   
test. This has not been cleared  
or approved by the U.S. Food and Drug Administration. The FDA   
has determined that such  
clearance is not necessary.  
  
   
   
                                                                  [facility]   
Phone:   
[phone]  
   
                                                    HCG Screen, Bloodon 20  
21   
   
                      HCG Screen, Blood Negative   Normal     NEG        [facility]  
   
                                        Comment on above:   Result Comment: Spec  
imens with hCG levels near the threshold   
of the test (25 mIU/mL) may give a  
negative or indeterminate result. In such cases, another test   
should be  
performed with a new specimen in 48-72 hours. If early   
pregnancy is suspected  
clinically in this setting, correlation with quantitative   
serum b-hCG level is  
suggested.  
[employer] has confirmed the use of plasma for this   
test. This has not  
been cleared or approved by the U.S. Food and Drug   
Administration. The FDA has  
determined that such clearance is not necessary.   
   
                                                            Performed By: #### D  
AU ####  
[facility]  
[address] [place] [address] [place] [address]  
[phone]  
: Haresh Zimmer MD   
   
                                                    Laboratory - Chemistry and C  
hemistry - challengeOrdered By: Rafael Gallagher on   
2021   
   
                                                    GFR/1.73 sq M.predicted   
MDRD (S/P/Bld) [Vol   
rate/Area]                                                      [facility]   
Phone:   
[phone]  
   
                                        Comment on above:   Average GFR for 20-2  
9 years old:  
116 mL/min/1.73sq m  
Chronic Kidney Disease:  
<60 mL/min/1.73sq m  
Kidney failure:  
<15 mL/min/1.73sq m  
  
  
eGFR calculated using average adult body mass. Additional eGFR   
calculator available at:  
  
http://www.[employer].com/multiple_crcl_2012.htm  
  
  
   
   
                                                            Stage 1: Some kidney  
 damage normal GFR  
Stage 2: Mild kidney damage GFR 60-89  
Stage 3: Moderate kidney damage GFR 30-59  
Stage 4: Severe kidney damage GFR 15-29  
Stage 5: Severe kidney damage GFR <15  
ESRD - chronic treatment by dialysis or transplant  
  
  
   
   
                                                    No Panel InformationOrdered   
By: Rafael Gallagher on 2021   
   
                                                    Interpretation and   
review of laboratory   
results         Abnormal                                        [facility]   
Phone:   
[phone]  
   
                                                                  [facility]   
[facility]   
Phone:   
[phone]  
   
                                                    SARS-CoV-2on 2021   
   
                                                    SARS-CoV-2 (COVID-19)   
RNA PAMELA+probe Ql (Unsp   
spec)           Not detected    Normal          NOTDET          [facility]  
   
                                        Comment on above:   Result Comment:  
Rapid NAAT: The specimen is NEGATIVE for SARS-CoV-2, the novel   
coronavirus  
associated with COVID-19.  
The ID NOW COVID-19 assay is designed to detect the virus that   
causes COVID-19  
in patients with signs and symptoms of infection who are   
suspected of COVID-19.  
An individual without symptoms of COVID-19 and who is not   
shedding SARS-CoV-2  
virus would expect to have a negative (not detected) result in   
this assay.  
Negative results should be treated as presumptive and, if   
inconsistent with  
clinical signs and symptoms or necessary for patient   
management,  
should be tested with an alternative molecular assay. Negative   
results do not  
preclude SARS-CoV-2 infection and  
should not be used as the sole basis for patient management   
decisions.  
Fact sheet for Healthcare Providers:   
https://www.fda.gov/media/125527/download  
Fact sheet for Patients:   
https://www.fda.gov/media/515192/download  
Methodology: Isothermal Nucleic Acid Amplification   
   
                                                            Performed By: #### C  
OVRB ####  
[facility]  
[address] [place] [address]  
[phone]  
: Haresh Zimmer MD   
   
                                                    SPECIMEN REJECTIONOrdered By  
: Rafael Gallagher on 2021   
   
                      -          NOT REPORTED                       [facility]   
[employer]   
Phone:   
[phone]  
   
                      Ordered Test CDP                              [facility]  
[employer]   
Phone:   
[phone]  
   
                                        Reason for Rejection Unable to perform   
testing: No specimen   
received.                                                   [facility]  
[employer]   
Phone:   
[phone]  
   
                                                    Specimen source Nom   
(Unsp spec)     .BLOOD                                          [facility]  
[employer]   
Phone:   
[phone]  
   
                                                                  [facility]  
[place]   
Phone:   
[phone]  
   
                                                    Salicylateon 2021   
   
                      Salicylate <1         Low        3-10       [facility]  
   
                                        Comment on above:   Performed By: #### D  
AU ####  
[facility]  
[address] [place], [place] [address] [phone]  
: Haresh Zimmer MD   
   
                                                    SalicylateOrdered By: Rafael Gallagher on 2021   
   
                          Salicylate Lvl <1           Low          3 - 10   
mg/dL                                   [facility]  
Work   
Phone:   
[phone]  
   
                                                    Specimen Rejectionon   
021   
   
                                        Reason for rejection Unable to perform   
testing: No specimen   
received.           Normal                                  [facility]  
   
                                        Comment on above:   Performed By: #### C  
OVRB ####  
[facility]  
[address] [place], [place] [address] [phone]  
: Haresh Zimmer MD   
   
                      Source of sample .BLOOD     Normal                [facility]  
   
                                        Comment on above:   Performed By: #### C  
OVRB ####  
[facility]  
[address] [place], [place] [address] [phone]  
: Haresh Zimmer MD   
   
                      Test ordered CDP        Normal                [facility]  
   
                                        Comment on above:   Performed By: #### C  
OVRB ####  
[facility]  
[address] [place], [place] [address] [phone]  
: Haresh Zimmer MD   
   
                      -----      NOT REPORTED [facility]  
   
                                        Comment on above:   Performed By: #### C  
OVRB ####  
[facility]  
[address] [place] [address]  
[phone]  
: Haresh Zimmer MD   
   
                                                    Urinalysis w/ Microon 2021   
   
                      -----                 Normal                [facility]  
   
                                        Comment on above:   Performed By: #### C  
OVRB ####  
[facility]  
[address] [place], [place] [address] [phone]  
: Haresh Zimmer MD   
   
                      Bacteria   1+         Abnormal   NONE       [facility]  
   
                                        Comment on above:   Performed By: #### C  
OVRB ####  
[facility]  
[address] [place] [address] [phone]  
: Haresh Zimmer MD   
   
                      Bilirubin, SemiQt,Ur Negative   Normal     NEG        [facility]  
   
                                        Comment on above:   Performed By: #### C  
OVRB ####  
[facility]  
[address] [place] [address] [phone]  
: Haresh Zimmer MD   
   
                      Blood, Urine Negative   Normal     NEG        [facility]  
   
                                        Comment on above:   Performed By: #### C  
OVRB ####  
[facility]  
[address] [place] [address] [phone]  
: Haresh Zimmer MD   
   
                      Clarity (U) CLEAR      Normal     CLEAR      [facility]  
   
                                        Comment on above:   Performed By: #### C  
OVRB ####  
[facility]  
[address] [place] [address] [phone]  
: Haresh Zimmer MD   
   
                      Color (U)  YELLOW     Normal     YEL        [facility]  
   
                                        Comment on above:   Performed By: #### C  
OVRB ####  
[facility]  
[address] [place], [place] [address] [phone]  
: Haresh Zimmer MD   
   
                                                    Epithelial cells LM Ql   
(Urine sed)     5 TO 10         Normal          0-25            [facility]  
   
                                        Comment on above:   Performed By: #### C  
OVRB ####  
[facility]  
[address] [place], [place] [address] [phone]  
: Haresh Zimmer MD   
   
                      Glucose Ql (U) Negative   Normal     NEG        [facility]  
   
                                        Comment on above:   Performed By: #### C  
OVRB ####  
[facility]  
[address] [place], [place] [address] [phone]  
: Haresh Zimmer MD   
   
                      Ketones Ql (U) Negative   Normal     NEG        [facility]  
   
                                        Comment on above:   Performed By: #### C  
OVRB ####  
[facility]  
[address] [place] [address] [phone]  
: Haresh Zimmer MD   
   
                                                    Leukocyte esterase Test   
strip Ql (U)    Negative        Normal          NEG             [facility]  
   
                                        Comment on above:   Performed By: #### C  
OVRB ####  
[facility]  
[address] [place], [place] [address] [phone]  
: Haresh Zimmer MD   
   
                      Nitrite,Ur Negative   Normal     NEG        [facility]  
   
                                        Comment on above:   Performed By: #### C  
OVRB ####  
[facility]  
[address] [place], [place] [address] [phone]  
: Haresh Zimmer MD   
   
                      PH,Ur      5.5        Normal     5.0-9.0    [facility]  
   
                                        Comment on above:   Performed By: #### C  
OVRB ####  
[facility]  
[address] [place], [place] [address] [phone]  
: Haresh Zimmer MD   
   
                      Protein Ql (U) Negative   Normal     NEG        [facility]  
   
                                        Comment on above:   Performed By: #### C  
OVRB ####  
[facility]  
[address] [place], [place] [address] [phone]  
: Haresh Zimmer MD   
   
                      Spec. Gravity,Ur 1.025      High       1.010-1.020 [facility]  
   
                                        Comment on above:   Performed By: #### C  
OVRB ####  
[facility]  
[address] [place], [place] [address] [phone]  
: Haresh Zimmer MD   
   
                      Urine RBC's None       Normal     0-2        [facility]  
   
                                        Comment on above:   Performed By: #### C  
OVRB ####  
[facility]  
[address] [place], [place] [address] [phone]  
: Haresh Zimmer MD   
   
                      Urine WBC's 0 TO 2     Normal     0-5        [facility]  
   
                                        Comment on above:   Performed By: #### C  
OVRB ####  
[facility]  
[address] [place], [place] [address] [phone]  
: Haresh Zimmer MD   
   
                      Urobilinogen,Ur Normal     Normal     NORM       [facility]  
   
                                        Comment on above:   Performed By: #### C  
OVRB ####  
[facility]  
[address] [place], [place] [address] [phone]  
: Haresh Zimmer MD   
   
                                                    Amorphous sediment LM   
Ql (Urine sed)  NOT REPORTED    Normal          NONE            [facility]  
   
                                        Comment on above:   Performed By: #### C  
OVRB ####  
[facility]  
[address] [place], [place] [address] [phone]  
: Haresh Zimmer MD   
   
                      Casts      NOT REPORTED Normal                [facility]  
   
                                        Comment on above:   Performed By: #### C  
OVRB ####  
[facility]  
[address] [place] [address] [phone]  
: Haresh Zimmer MD   
   
                      Comment    NOT REPORTED Normal                [facility]  
   
                                        Comment on above:   Performed By: #### C  
OVRB ####  
[facility]  
[address] [place] [address] [phone]  
: Haresh Zimmer MD   
   
                                                    Crystals LM Nom (Urine   
sed)            NOT REPORTED    Normal          [facility]  
   
                                        Comment on above:   Performed By: #### C  
OVRB ####  
[facility]  
[address] [place] [address] [phone]  
: Haresh Zimemr MD   
   
                      Epithelial, Renal NOT REPORTED Normal     0          [facility]  
   
                                        Comment on above:   Performed By: #### C  
OVRB ####  
[facility]  
[address] [place], [place] [address] [phone]  
: Haresh Zimmer MD   
   
                      Mucus Strands NOT REPORTED Normal     [facility]  
   
                                        Comment on above:   Performed By: #### C  
OVRB ####  
[facility]  
[address] [place] [address] [phone]  
: Haresh Zimmer MD   
   
                      Other Observations NOT REPORTED Normal     NREQ       [facility]  
   
                                        Comment on above:   Performed By: #### C  
OVRB ####  
[facility]  
[address] [place] [address] [phone]  
: Haresh Zimmer MD   
   
                      Trichomonas NOT REPORTED Normal     [facility]  
   
                                        Comment on above:   Performed By: #### C  
OVRB ####  
[facility]  
[address] [place] [address] [phone]  
: Haresh Zimmer MD   
   
                      Yeast      NOT REPORTED Normal     NONE       [facility]  
   
                                        Comment on above:   Performed By: #### C  
OVRB ####  
[facility]  
[address] [place] [address] [phone]  
: Haresh Zimmer MD   
   
                                                    Urinalysis with microscopicO  
rdered By: Seth Rahman on 2021   
   
                      -                                           [employer]  
Work   
Phone:   
[phone]  
   
                      Amorphous, UA NOT REPORTED            None       [facility]   
Phone:   
[phone]  
   
                      Bacteria, UA 1+         Abnormal   None       [facility]   
Phone:   
[phone]  
   
                      Bilirubin Urine Negative              NEGATIVE   [facility]   
Phone:   
[phone]  
   
                      Casts UA   NOT REPORTED            /LPF       [facility]   
Phone:   
[phone]  
   
                      Color, UA  YELLOW                YELLOW     [facility]  
Work   
Phone:   
[phone]  
   
                      Crystals, UA NOT REPORTED            None /HPF  [facility]  
Work   
Phone:   
[phone]  
   
                      Epithelial Cells UA 5 TO 10                          [facility]   
Phone:   
[phone]  
   
                      Glucose, Ur Negative              NEGATIVE   [facility]   
Phone:   
[phone]  
   
                                                    Interpretation and   
review of laboratory   
results         Abnormal                                        [facility]   
Phone:   
[phone]  
   
                      Ketones Ql (U) Negative              NEGATIVE   [facility]   
Phone:   
[phone]  
   
                                                    Leukocyte esterase Test   
strip Ql (U)    Negative                        NEGATIVE        [facility]   
Phone:   
[phone]  
   
                      Mucus, UA  NOT REPORTED            None       [facility]   
Phone:   
[phone]  
   
                      Nitrite, Urine Negative              NEGATIVE   [facility]   
Phone:   
[phone]  
   
                      Other Observations UA NOT REPORTED            NOT REQ.   [facility]  
[facility]  
Work   
Phone:   
[phone]  
   
                      pH, UA     5.5                              [facility]  
Work   
Phone:   
[phone]  
   
                      Protein, UA Negative              NEGATIVE   [facility]   
Phone:   
[phone]  
   
                      RBC, UA    None                             [facility]   
Health  
Work   
Phone:   
[phone]  
   
                      Renal Epithelial, UA NOT REPORTED            0 /HPF     [facility]  
y   
Health  
Work   
Phone:   
[phone]  
   
                      Specific [place], UA 1.025      High                  [facility]  
y   
Health  
Work   
Phone:   
[phone]  
   
                      Trichomonas, UA NOT REPORTED            None       [facility]   
Health  
Work   
Phone:   
[phone]  
   
                      Turbidity UA CLEAR                 CLEAR      [facility]   
Health  
Work   
Phone:   
[phone]  
   
                      Urinalysis Comments NOT REPORTED                       [facility]   
Health  
Work   
Phone:   
[phone]  
   
                      Urine Hgb  Negative              NEGATIVE   [facility]   
Health  
Work   
Phone:   
[phone]  
   
                      Urobilinogen, Urine Normal                Normal     [facility]  
   
Health  
Work   
Phone:   
[phone]  
   
                      WBC, UA    0 TO 2                           [facility]   
Health  
Work   
Phone:   
[phone]  
   
                      Yeast, UA  NOT REPORTED            None       [facility]   
Health  
Work   
Phone:   
[phone]  
   
                                                                  [facility]   
Health  
Work   
Phone:   
[phone]  
   
                                                    Urine Drug ScreenOrdered By:  
 Rafael Gallagher on 2021   
   
                      Amphetamine Screen, Ur Negative              NEGATIVE   [facility]   
Health  
Work   
Phone:   
[phone]  
   
                      Barbiturate Screen, Ur Negative              NEGATIVE   [facility]   
Health  
Work   
Phone:   
[phone]  
   
                                                    Benzodiazepine Screen,   
Urine           Negative                        NEGATIVE        [facility]   
Health  
Work   
Phone:   
[phone]  
   
                      Buprenorphine Urine Negative              NEGATIVE   [facility]  
   
Health  
Work   
Phone:   
[phone]  
   
                      Cannabinoid Scrn, Ur Positive   Abnormal   NEGATIVE   [facility]  
y   
Health  
Work   
Phone:   
[phone]  
   
                                                    Cocaine Metabolite,   
Urine           Negative                        NEGATIVE        [facility]   
Health  
Work   
Phone:   
[phone]  
   
                                                    Interpretation and   
review of laboratory   
results         Abnormal                                        [facility]   
Health  
Work   
Phone:   
[phone]  
   
                      Methadone Screen, Urine Negative              NEGATIVE   [facility]  
[facility]   
Health  
Work   
Phone:   
[phone]  
   
                      Methamphetamine, Urine Negative              NEGATIVE   [facility]  
y   
Health  
Work   
Phone:   
[phone]  
   
                      Opiates, Urine Negative              NEGATIVE   [facility]   
Health  
Work   
Phone:   
[phone]  
   
                      Oxycodone Screen, Ur Negative              NEGATIVE   [facility]  
y   
Health  
Work   
Phone:   
[phone]  
   
                      Phencyclidine, Urine Negative              NEGATIVE   [facility]  
y   
Health  
Work   
Phone:   
[phone]  
   
                      Propoxyphene, Urine Negative              NEGATIVE   [facility]   
Phone:   
[phone]  
   
                      Test Information NOT REPORTED                       [facility]   
Phone:   
[phone]  
   
                                                    Tricyclic   
Antidepressants, Urine Negative                        NEGATIVE        [facility]   
Phone:   
[phone]  
   
                                        Comment on above:   Drug screen results   
are to be used for medical purposes only.   
All positive results are  
unconfirmed. Testing for employment or legal uses should be   
sent to a reference laboratory  
for confirmation.  
  
   
   
                                                                  [facility]   
Phone:   
[phone]  
   
                                                    COVID-19 PCRon 2020   
   
                          SARS-CoV-2, PAMELA Not Detected Normal       Not   
Detected                                The   
[facility]  
   
                                        Comment on above:   Result Comment: This  
 test was developed and its performance   
characteristics determined  
by [email or web address]. This test has not been FDA cleared or  
approved. This test has been authorized by FDA under an   
Emergency Use  
Authorization (EUA). This test is only authorized for the   
duration of  
time the declaration that circumstances exist justifying the  
authorization of the emergency use of in vitro diagnostic   
tests for  
detection of SARS-CoV-2 virus and/or diagnosis of COVID-19   
infection  
under section 564(b)(1) of the Act, 21 U.S.C. 360bbb-3(b)(1),   
unless  
the authorization is terminated or revoked sooner.  
When diagnostic testing is negative, the possibility of a   
false  
negative result should be considered in the context of a   
patient's  
recent exposures and the presence of clinical signs and   
symptoms  
consistent with COVID-19. An individual without symptoms of   
COVID-19  
and who is not shedding SARS-CoV-2 virus would expect to have   
a  
negative (not detected) result in this assay.   
   
                                                            Performed By: #### C  
VDPCR ####  
[facility]  
[address] [place] [address]  
Sosa Multani   
   
                                                    CBC/PLATELET & AUTO DIFFEREN  
[place] 2017   
   
                                                    Basophils Auto #/vol   
(Bld)           0.0 10*3/uL     Normal          0.0-0.1         [facility]  
   
                                                    Basophils/100 WBC Auto   
(Bld)           0 %             Normal          0-1             [facility]  
   
                                                    CBC/PLATELET & AUTO   
DIFFERENTIAL    0.0 10*3/uL     Normal          -               [facility]  
   
                                        Comment on above:   Result Comment: er 7  
   
   
                      Eosinophils 0.0 10*3/uL Normal     0.0-0.4    [facility]  
   
                                                    Eosinophils/100   
leukocytes      0 %             Normal          0-5             [facility]  
   
                                                    Erythrocyte   
distribution width Auto   
Ratio (RBC)     12.9 %          Normal          11.7-14.4       [facility]  
   
                      Erythrocytes (RBC) 4.79 10*6/uL Normal     4.20-5.40  [facility]  
   
                      Hematocrit (HCT) 40.0 %     Normal     37.0-47.0  [facility]  
   
                                                    Hemoglobin mass conc   
(Bld)           14.0 g/dL       Normal          11.5-16.0       [facility]  
   
                      Lymphocytes 1.8 10*3/uL Normal     0.9-2.5    [facility]  
   
                                                    Lymphocytes/100   
leukocytes      10 %            Low             13-44           [facility]  
   
                      MCH        29.2 pg    Normal     27.0-31.0  [facility]  
   
                      MCHC mass conc (RBC) 35.0 g/dL  Normal     31.5-36.0  [facility]  
   
                      MCV        83.5 fL    Normal     82.0-101.0 [facility]  
   
                      Monocytes  1.0 10*3/uL Normal     0.1-1.0    [facility]  
   
                                                    Monocytes/100   
leukocytes      6 %             Normal          5-9             [facility]  
   
                      Neutrophils 14.5 10*3/uL High       2.1-6.5    [facility]  
   
                                                    Neutrophils/100   
leukocytes      83 %            High            39-75           [facility]  
   
                      Nucleated erythrocytes 0 %        Normal     -          [facility]  
   
                                                    Platelet mean volume   
(PMV)           8.5 fL          Normal          8.2-11.4        [facility]  
   
                      Platelets  239.0 10*3/uL Normal     130.0-400.0 [facility]  
   
                      WBC (Leukocytes) 17.6 10*3/uL High       4.4-10.2   [facility]  
   
                                                    CHEST PORTABLEon 2017   
   
                                        CHEST PORTABLE      EXAM: Portable   
chest.CLINICAL STATEMENT:   
Syncopal episode   
today.COMPARISON:   
2017.TECHNIQUE:   
Single mobile AP upright   
view chest at 3:21   
PM.FINDINGS: Heart size   
within normal limits.   
There is no mediastinal   
widening.The lung fields   
and pleural spaces are   
clear.IMPRESSION:No acute   
change.Dictated   
Physician: Je BellDictated On: 2017   
15:30Interpreted By:   
Je BellTranscribed By: Gunnar Belligned By: Je Bell.Signed On:   
2017 15:30    Normal                                  [facility]  
   
                                                    HCG URINEon 2017   
   
                                                    HCG.beta subunit   
(pregnancy test) Ql (U) Negative        Normal          Negative        [facility]  
   
                                                    HCG.beta subunit   
(pregnancy test) Ql (U) SEE NOTE        Normal          -               [facility]  
   
                                        Comment on above:   Result Comment: er 7  
   
   
                                                    M I PANELon 2017   
   
                      Anion gap  12.8 mmol/L Normal     -          [facility]  
   
                      BUN/Creatinine Ratio 10.2 mg/mg Normal     [facility]  
   
                      Calcium    8.8 mg/dL  Normal     8.5-10.1   [facility]  
   
                      Chloride   107 mmol/L Normal          [facility]  
   
                      CO2        27.1 mmol/L Normal     21.0-32.0  [facility]  
   
                      Creatinine 0.98 mg/dL Normal     0.55-1.02  [facility]  
   
                      eGFR (non-black) mL/min/{1.73_m2} Normal     >60        [facility]  
   
                      Glucose mass conc 87 mg/dL   Normal          [facility]  
   
                      M I PANEL  SEE NOTE   Normal     -          [facility]  
   
                                        Comment on above:   Result Comment: er 7  
   
   
                      Magnesium  1.9 mg/dL  Normal     1.8-2.4    [facility]  
   
                      Osmolality 283 mosm/kg Normal     -          [facility]  
   
                      Potassium molar conc 3.9 mmol/L Normal     3.5-5.1    [facility]  
   
                      Sodium     143 mmol/L Normal     136-145    [facility]  
   
                                                    Troponin I.cardiac mass   
conc            ng/mL           Normal          0.000-0.056     [facility]  
   
                      Urea nitrogen 10 mg/dL   Normal     7-18       [facility]  
   
                                                    PT PROTIMEon 2017   
   
                      INR Coag RelTime (PPP) 1.0 {INR}  Normal     -          [facility]  
   
                                                    Prothrombin time (PT)   
Coag time (PPP) 10.5 s          Normal          9.3-11.7        [facility]  
   
                      PT PROTIME SEE NOTE   Normal     [facility]  
   
                                        Comment on above:   Result Comment: er 7  
   
   
                                                    PTTon 2017   
   
                      aPTT       24.8 s     Normal     24.5-32.7  [facility]  
   
                                        Comment on above:   Result Comment: er 7  
   
   
                                                    URINALYSIS W/ MICROSCOPIC if  
 indicatedon 2017   
   
                      Bilirubin Ql (U) Negative   Normal     Negative   [facility]  
   
                      Blood      Moderate   Abnormal   Negative   [facility]  
   
                      Blood product type Clean catch Normal     -          [facility]  
   
                      Glucose mass conc Normal     Normal     Normal     [facility]  
   
                      Protein    Moderate   Abnormal   Negative   [facility]  
   
                      Sq. Epithelial Cells 3 /[HPF]   Abnormal   None seen  [facility]  
   
                                                    URINALYSIS W/   
MICROSCOPIC if   
indicated       0 /[HPF]        Normal          0-2             [facility]  
   
                                        Comment on above:   Result Comment: er 7  
   
   
                      Urine, appearance CLEAR      Normal     Clear      [facility]  
   
                                                    Urine, bacteria in   
sediment        1 /[HPF]        Abnormal        None seen       [facility]  
   
                      Urine, color YELLOW     Normal     -          [facility]  
   
                      Urine, ketones presence Negative   Normal     Negative   H  
[facility]  
   
                      Urine, nitrite presence Positive   Abnormal   Negative   H  
[facility]  
   
                      Urine, pH  7.0 [pH]   Normal     5.0 - 9.0  [facility]  
   
                          Urine, specific gravity 1.010        Normal       1.01  
0 -   
1.030                                   [facility]  
   
                      Urine, urobilinogen Normal     Normal     Normal     [facility]  
   
                      WBC (Leukocytes) 6 /[HPF]   Abnormal   0-5        [facility]  
   
                      WBC (Leukocytes) Moderate   Abnormal   Negative   [facility]  
  
  
  
Vital Signs  
  
  
                          Date Time    Vital Sign   Value        Performing   
Clinician                               Facility  
   
                                                    2024   
16:          Body height         162.56 cm           Doreen Cabrera CNP  
Work Phone:   
[phone]                          [facility]  
Work Phone:   
[phone]  
   
                                                    2024   
16:                              Body mass index   
(BMI) [Ratio]             57.2 kg/m2                Doreen Cbarera [facility]  
Work Phone:   
[phone]                          [facility]  
Work Phone:   
[phone]  
   
                                                    2024   
16:                              Body surface area   
Derived from formula      2.4 m2                    Doreen Cabrera [facility]  
Work Phone:   
[phone]                          [facility]  
Work Phone:   
[phone]  
   
                                                    2024   
16:          Body temperature    97.6 [degF]         Doreen Cabrera CNP  
Work Phone:   
[phone]                          [facility]  
Work Phone:   
[phone]  
   
                                                    2024   
16:          Body weight         151.05 kg           Doreen Cabrera CNP  
Work Phone:   
[phone]                          [facility]  
Work Phone:   
[phone]  
   
                                                    2024   
16:                              Diastolic blood   
pressure                  86 mm[Hg]                 oDreen Cabrera CNP  
Work Phone:   
[phone]                          [facility]  
Work Phone:   
[phone]  
   
                                                    2024   
16:          Heart rate          103 /min            Doreen Cabrera CNP  
Work Phone:   
[phone]                          [facility]  
Work Phone:   
[phone]  
   
                                                    2024   
16:          Respiratory rate    18 /min             Doreen Cabrera CNP  
Work Phone:   
[phone]                          [facility]  
Work Phone:   
[phone]  
   
                                                    2024   
16:                              SaO2% (BldA) [Mass   
fraction]                 95 %                      Doreen Cabrera CNP  
Work Phone:   
[phone]                          [facility]  
Work Phone:   
[phone]  
   
                                                    2024   
16:                              Systolic blood   
pressure                  135 mm[Hg]                Doreen Cabrera CNP  
Work Phone:   
[phone]                          [facility]  
Work Phone:   
[phone]  
   
                                                    2024   
17:                              Diastolic blood   
pressure                  84 mm[Hg]                 Doreen Cabrera CNP  
Work Phone:   
[phone]                          [facility]  
   
                                        Comment on above:   manual large   
   
                                                    2024   
17:                              Systolic blood   
pressure                  144 mm[Hg]                Doreen Cabrera CNP  
Work Phone:   
[phone]                          [facility]  
   
                                        Comment on above:   manual large   
   
                                                    2024   
17:                              Diastolic blood   
pressure                  86 mm[Hg]                 Doreen Cabrera CNP  
Work Phone:   
[phone]                          [facility]  
Work Phone:   
[phone]  
   
                                                    2024   
17:                              Systolic blood   
pressure                  154 mm[Hg]                Doreen Cabrera CNP  
Work Phone:   
[phone]                          [facility]  
Work Phone:   
[phone]  
   
                                                    2024   
16:          Body height         162.56 cm           Doreen Cabrera CNP  
Work Phone:   
[phone]                          [facility]  
Work Phone:   
[phone]  
   
                                                    2024   
16:                              Body mass index   
(BMI) [Ratio]             57.9 kg/m2                Doreen Cabrera CNP  
Work Phone:   
[phone]                          [facility]  
Work Phone:   
[phone]  
   
                                                    2024   
16:                              Body surface area   
Derived from formula      2.4 m2                    Doreen Cabrera CNP  
Work Phone:   
[phone]                          [facility]  
Work Phone:   
[phone]  
   
                                                    2024   
16:          Body weight         153.14 kg           Doreen Cabrera CNP  
Work Phone:   
[phone]                          [facility]  
Work Phone:   
[phone]  
   
                                                    2024   
16:                              Diastolic blood   
pressure                  98 mm[Hg]                 Doreen Cabrera CNP  
Work Phone:   
[phone]                          [facility]  
Work Phone:   
[phone]  
   
                                                    2024   
16:          Heart rate          103 /min            Doreen Cabrera CNP  
Work Phone:   
[phone]                          [facility]  
Work Phone:   
[phone]  
   
                                                    2024   
16:                              SaO2% (BldA) [Mass   
fraction]                 96 %                      Doreen Cabrera CNP  
Work Phone:   
[phone]                          [facility]  
Work Phone:   
[phone]  
   
                                                    2024   
16:                              Systolic blood   
pressure                  151 mm[Hg]                Doreen Cabrera CNP  
Work Phone:   
[phone]                          [facility]  
Work Phone:   
[phone]  
   
                                                    2024   
16:                              Diastolic blood   
pressure                  89 mm[Hg]                 Doreen Cabrera CNP  
Work Phone:   
[phone]                          [facility]  
   
                                                    2024   
16:          Heart rate          75 /min             Doreen Cabrera CNP  
Work Phone:   
[phone]                          [facility]  
   
                                                    2024   
16:                              Systolic blood   
pressure                  137 mm[Hg]                Doreen Cabrera CNP  
Work Phone:   
[phone]                          [facility]  
   
                                                    2024   
16:          Body height         162.56 cm           Doreen Cabrera CNP  
Work Phone:   
[phone]                          [facility]  
   
                                                    2024   
16:                              Body mass index   
(BMI) [Ratio]             54.6 kg/m2                Doreen Cabrera CNP  
Work Phone:   
[phone]                          [facility]  
   
                                                    2024   
16:                              Body surface area   
Derived from formula      2.4 m2                    Doreen Cabrera CNP  
Work Phone:   
[phone]                          [facility]  
   
                                                    2024   
16:          Body weight         144.24 kg           Doreen Cabrera CNP  
Work Phone:   
[phone]                          [facility]  
   
                                                    2024   
16:                              Diastolic blood   
pressure                  94 mm[Hg]                 Doreen Cabrera CNP  
Work Phone:   
[phone]                          [facility]  
   
                                                    2024   
16:          Heart rate          75 /min             Doreen Cabrera CNP  
Work Phone:   
[phone]                          [facility]  
   
                                                    2024   
16:                              SaO2% (BldA) [Mass   
fraction]                 98 %                      Doreen Cabrera CNP  
Work Phone:   
[phone]                          [facility]  
   
                                                    2024   
16:                              Systolic blood   
pressure                  161 mm[Hg]                Doreen Cabrera CNP  
Work Phone:   
[phone]                          [facility]  
   
                                                    2024   
19:          Body height         162.56 cm           Doreen Cabrera CNP  
Work Phone:   
[phone]                          [facility]  
   
                                                    2024   
19:                              Body mass index   
(BMI) [Ratio]             52.7 kg/m2                Doreen Cabrera CNP  
Work Phone:   
[phone]                          [facility]  
   
                                                    2024   
19:                              Body surface area   
Derived from formula      2.3 m2                    Doreen Cabrera CNP  
Work Phone:   
[phone]                          [facility]  
   
                                                    2024   
19:          Body weight         139.26 kg           Doreen Cabrera CNP  
Work Phone:   
[phone]                          [facility]  
   
                                                    2024   
19:                              Diastolic blood   
pressure                  88 mm[Hg]                 Doreen Deborah CNP  
Work Phone:   
[phone]                          [facility]  
   
                                                    2024   
19:          Heart rate          106 /min            Doreen Cabrera CNP  
Work Phone:   
[phone]                          [facility]  
   
                                                    2024   
19:                              SaO2% (BldA) [Mass   
fraction]                 97 %                      Doreen Cabrera CNP  
Work Phone:   
[phone]                          [facility]  
   
                                                    2024   
19:                              Systolic blood   
pressure                  134 mm[Hg]                Doreen Mccormacken CNP  
Work Phone:   
[phone]                          [facility]  
   
                                                    2023   
15:                              Diastolic blood   
pressure                  69 mm[Hg]                 Doreen Mccormacken CNP  
Work Phone:   
[phone]                          [facility]  
   
                                                    2023   
15:                              Systolic blood   
pressure                  116 mm[Hg]                Doreen Deborah CNP  
Work Phone:   
[phone]                          [facility]  
   
                                                    2023   
15:                              Diastolic blood   
pressure                  78 mm[Hg]                 Doreen Deborah CNP  
Work Phone:   
[phone]                          [facility]  
Work Phone:   
[phone]  
   
                                                    2023   
15:                              Systolic blood   
pressure                  137 mm[Hg]                Doreen Deborah CNP  
Work Phone:   
[phone]                          [facility]  
Work Phone:   
[phone]  
   
                                                    2023   
17:          Body height         162.56 cm           Doreen Deborah CNP  
Work Phone:   
[phone]                          [facility]  
Work Phone:   
[phone]  
   
                                                    2023   
17:                              Body mass index   
(BMI) [Ratio]             32.8 kg/m2                Doreen Cabrera CNP  
Work Phone:   
[phone]                          [facility]  
Work Phone:   
[phone]  
   
                                                    2023   
17:                              Body surface area   
Derived from formula      1.9 m2                    Doreen Cabrera CNP  
Work Phone:   
[phone]                          [facility]  
Work Phone:   
[phone]  
   
                                                    2023   
17:          Body temperature    98.2 [degF]         Doreen Cabrera CNP  
Work Phone:   
[phone]                          [facility]  
Work Phone:   
[phone]  
   
                                                    2023   
17:          Body weight         86.64 kg            Doreen Cabrera CNP  
Work Phone:   
[phone]                          [facility]  
Work Phone:   
[phone]  
   
                                                    2023   
17:                              Diastolic blood   
pressure                  76 mm[Hg]                 Doreen Cabrera CNP  
Work Phone:   
[phone]                          [facility]  
Work Phone:   
[phone]  
   
                                                    2023   
17:          Heart rate          97 /min             Doreen Cabrera CNP  
Work Phone:   
[phone]                          [facility]  
Work Phone:   
[phone]  
   
                                                    2023   
17:                              SaO2% (BldA) [Mass   
fraction]                 97 %                      Doreen Cabrera CNP  
Work Phone:   
[phone]                          [facility]  
Work Phone:   
[phone]  
   
                                                    2023   
17:                              Systolic blood   
pressure                  111 mm[Hg]                Doreen Cabrera CNP  
Work Phone:   
[phone]                          [facility]  
Work Phone:   
[phone]  
   
                                                    07-   
14:                              Diastolic blood   
pressure                  76 mm[Hg]                 Doreen Cabrera CNP  
Work Phone:   
[phone]                          [facility]  
   
                                                    07-   
14:          Heart rate          94 /min             Doreen Cabrera CNP  
Work Phone:   
[phone]                          [facility]  
   
                                                    07-   
14:                              Systolic blood   
pressure                  124 mm[Hg]                Doreen Cabrera CNP  
Work Phone:   
[phone]                          [facility]  
   
                                                    10-   
13:          Body height         162.56 cm           Doreen Cabrera CNP  
Work Phone:   
[phone]                          [facility]  
Work Phone:   
[phone]  
   
                                                    10-   
13:                              Body mass index   
(BMI) [Ratio]             52.5 kg/m2                Doreen Cabrera CNP  
Work Phone:   
[phone]                          [facility]  
Work Phone:   
[phone]  
   
                                                    10-   
13:                              Body surface area   
Derived from formula      2.3 m2                    Doreen Cabrera CNP  
Work Phone:   
[phone]                          [facility]  
Work Phone:   
[phone]  
   
                                                    10-   
13:          Body temperature    99.3 [degF]         Doreen Cabrera CNP  
Work Phone:   
[phone]                          [facility]  
Work Phone:   
[phone]  
   
                                                    10-   
13:          Body weight         138.8 kg            Doreen Cabrera CNP  
Work Phone:   
[phone]                          [facility]  
Work Phone:   
[phone]  
   
                                                    10-   
13:                              Diastolic blood   
pressure                  86 mm[Hg]                 Doreen Cabrera CNP  
Work Phone:   
[phone]                          [facility]  
Work Phone:   
[phone]  
   
                                                    10-   
13:          Heart rate          96 /min             Doreen Cabrera CNP  
Work Phone:   
[phone]                          [facility]  
Work Phone:   
[phone]  
   
                                                    10-   
13:          Heart Rate Rhythm   1 1                 Doreen Cabrera CNP  
Work Phone:   
[phone]                          [facility]  
Work Phone:   
[phone]  
   
                                                    10-   
13:                              SaO2% (BldA) [Mass   
fraction]                 97 %                      Doreen Cabrera CNP  
Work Phone:   
[phone]                          [facility]  
Work Phone:   
[phone]  
   
                                                    10-   
13:                              Systolic blood   
pressure                  124 mm[Hg]                Doreen Cabrera CNP  
Work Phone:   
[phone]                          [facility]  
Work Phone:   
[phone]  
   
                                                    2022   
15:          Body height         162.56 cm           Doreen Cabrera CNP  
Work Phone:   
[phone]                          [facility]  
Work Phone:   
[phone]  
   
                                                    2022   
15:                              Body mass index   
(BMI) [Ratio]             53.6 kg/m2                Doreen Cabrera CNP  
Work Phone:   
[phone]                          [facility]  
Work Phone:   
[phone]  
   
                                                    2022   
15:                              Body surface area   
Derived from formula      2.36 m2                   Doreen Cabrera CNP  
Work Phone:   
[phone]                          [facility]  
Work Phone:   
[phone]  
   
                                                    2022   
15:                              Body surface area   
Derived from formula      2.4 m2                    Doreen Cabrera CNP  
Work Phone:   
[phone]                          [facility]  
Work Phone:   
[phone]  
   
                                                    2022   
15:          Body temperature    97.4 [degF]         Doreen Cabrera CNP  
Work Phone:   
[phone]                          [facility]  
Work Phone:   
[phone]  
   
                                                    2022   
15:          Body weight         141.52 kg           Doreen Cabrera CNP  
Work Phone:   
[phone]                          [facility]  
Work Phone:   
[phone]  
   
                                                    2022   
15:                              Diastolic blood   
pressure                  82 mm[Hg]                 Doreen Cabrera CNP  
Work Phone:   
[phone]                          [facility]  
Work Phone:   
[phone]  
   
                                                    2022   
15:          Heart rate          86 /min             Doreen Cabrera CNP  
Work Phone:   
[phone]                          [facility]  
Work Phone:   
[phone]  
   
                                                    2022   
15:                              SaO2% (BldA) [Mass   
fraction]                 98 %                      Doreen Cabrera CNP  
Work Phone:   
[phone]                          [facility]  
Work Phone:   
[phone]  
   
                                                    2022   
15:                              Systolic blood   
pressure                  124 mm[Hg]                Doreen Cabrera CNP  
Work Phone:   
[phone]                          [facility]  
Work Phone:   
[phone]  
   
                                                    2022   
15:          Body height         162.56 cm           Doreen Cabrera CNP  
Work Phone:   
[phone]                          [facility]  
Work Phone:   
[phone]  
   
                                                    2022   
15:                              Body mass index   
(BMI) [Ratio]             52.2 kg/m2                Doreen Cabrera CNP  
Work Phone:   
[phone]                          [facility]  
Work Phone:   
[phone]  
   
                                                    2022   
15:                              Body surface area   
Derived from formula      2.34 m2                   Doreen Cabrera CNP  
Work Phone:   
[phone]                          [facility]  
Work Phone:   
[phone]  
   
                                                    2022   
15:                              Body surface area   
Derived from formula      2.3 m2                    Doreen Cabrera CNP  
Work Phone:   
[phone]                          [facility]  
Work Phone:   
[phone]  
   
                                                    2022   
15:          Body temperature    99.3 [degF]         Doreen Cabrera CNP  
Work Phone:   
[phone]                          [facility]  
Work Phone:   
[phone]  
   
                                                    2022   
15:          Body weight         137.89 kg           Doreen Cabrera CNP  
Work Phone:   
[phone]                          [facility]  
Work Phone:   
[phone]  
   
                                                    2022   
15:                              Diastolic blood   
pressure                  94 mm[Hg]                 Doreen Cabrera CNP  
Work Phone:   
[phone]                          [facility]  
Work Phone:   
[phone]  
   
                                                    2022   
15:          Heart rate          105 /min            Doreen Cabrera CNP  
Work Phone:   
[phone]                          [facility]  
Work Phone:   
[phone]  
   
                                                    2022   
15:          Heart Rate Rhythm   1 1                 Doreen Cabrera CNP  
Work Phone:   
[phone]                          [facility]  
Work Phone:   
[phone]  
   
                                                    2022   
15:                              SaO2% (BldA) [Mass   
fraction]                 98 %                      Doreen Cabrera CNP  
Work Phone:   
[phone]                          [facility]  
Work Phone:   
[phone]  
   
                                                    2022   
15:                              Systolic blood   
pressure                  126 mm[Hg]                Doreen Cabrera CNP  
Work Phone:   
[phone]                          [facility]  
Work Phone:   
[phone]  
   
                                                    2022   
16:                              Diastolic blood   
pressure                  102 mm[Hg]                Doreen Cabrera CNP  
Work Phone:   
[phone]                          [facility]  
Work Phone:   
[phone]  
   
                                                    2022   
16:                              Systolic blood   
pressure                  150 mm[Hg]                Doreen Deborah CNP  
Work Phone:   
[phone]                          [facility]  
Work Phone:   
[phone]  
   
                                                    2022   
15:          Body height         162.56 cm           Doreen Cabrera CNP  
Work Phone:   
[phone]                          [facility]  
Work Phone:   
[phone]  
   
                                                    2022   
15:                              Body mass index   
(BMI) [Ratio]             52.6 kg/m2                Doreen Cabrera CNP  
Work Phone:   
[phone]                          [facility]  
Work Phone:   
[phone]  
   
                                                    2022   
15:                              Body surface area   
Derived from formula      2.34 m2                   Doreen Cabrera CNP  
Work Phone:   
[phone]                          [facility]  
Work Phone:   
[phone]  
   
                                                    2022   
15:                              Body surface area   
Derived from formula      2.3 m2                    Doreen Cabrera CNP  
Work Phone:   
[phone]                          [facility]  
Work Phone:   
[phone]  
   
                                                    2022   
15:          Body temperature    98.5 [degF]         Doreen Cabrera CNP  
Work Phone:   
[phone]                          [facility]  
Work Phone:   
[phone]  
   
                                                    2022   
15:          Body weight         138.98 kg           Doreen Cabrera CNP  
Work Phone:   
[phone]                          [facility]  
Work Phone:   
[phone]  
   
                                                    2022   
15:                              Diastolic blood   
pressure                  108 mm[Hg]                Doreen Cabrera CNP  
Work Phone:   
[phone]                          [facility]  
Work Phone:   
[phone]  
   
                                                    2022   
15:          Heart rate          89 /min             Doreen Cabrera CNP  
Work Phone:   
[phone]                          [facility]  
Work Phone:   
[phone]  
   
                                                    2022   
15:                              SaO2% (BldA) [Mass   
fraction]                 99 %                      Doreen Cabrera CNP  
Work Phone:   
[phone]                          [facility]  
Work Phone:   
[phone]  
   
                                                    2022   
15:                              Systolic blood   
pressure                  152 mm[Hg]                Doreen Cabrera CNP  
Work Phone:   
[phone]                          [facility]  
Work Phone:   
[phone]  
   
                                                    2021   
13:          Body height         162.56 cm           Doreen Cabrera CNP  
Work Phone:   
[phone]                          [facility]  
Work Phone:   
[phone]  
   
                                                    2021   
13:                              Body mass index   
(BMI) [Ratio]             50.1 kg/m2                Doreen Cabrera CNP  
Work Phone:   
[phone]                          [facility]  
Work Phone:   
[phone]  
   
                                                    2021   
13:                              Body surface area   
Derived from formula      2.3 m2                    Doreen Cabrera CNP  
Work Phone:   
[phone]                          [facility]  
Work Phone:   
[phone]  
   
                                                    2021   
13:          Body temperature    96.8 [degF]         Doreen Cabrera CNP  
Work Phone:   
[phone]                          [facility]  
Work Phone:   
[phone]  
   
                                                    2021   
13:          Body weight         132.45 kg           Doreen Cabrera CNP  
Work Phone:   
[phone]                          [facility]  
Work Phone:   
[phone]  
   
                                                    2021   
13:                              Diastolic blood   
pressure                  86 mm[Hg]                 Doreen Cabrera CNP  
Work Phone:   
[phone]                          [facility]  
Work Phone:   
[phone]  
   
                                                    2021   
13:          Heart rate          86 /min             Doreen Cabrera CNP  
Work Phone:   
[phone]                          [facility]  
Work Phone:   
[phone]  
   
                                                    2021   
13:          Respiratory rate    18 /min             Doreen Cabrera CNP  
Work Phone:   
[phone]                          [facility]  
Work Phone:   
[phone]  
   
                                                    2021   
13:                              SaO2% (BldA) [Mass   
fraction]                 96 %                      Doreen Cabrera CNP  
Work Phone:   
[phone]                          [facility]  
Work Phone:   
[phone]  
   
                                                    2021   
13:                              Systolic blood   
pressure                  132 mm[Hg]                Doreen Cabrera CNP  
Work Phone:   
[phone]                          [facility]  
Work Phone:   
[phone]  
   
                                                    2021   
23:          Heart rate          100 /min            Seth Rahman MD  
Work Phone:   
[phone]                          [facility]  
Work Phone:   
[phone]  
   
                                                    2021   
23:                              Diastolic blood   
pressure                  94 mm[Hg]                 Seth Rahman MD  
Work Phone:   
[phone]                          [facility]  
Work Phone:   
[phone]  
   
                                                    2021   
23:                              Systolic blood   
pressure                  146 mm[Hg]                eSth Rahman MD  
Work Phone:   
[phone]                          [facility]  
Work Phone:   
[phone]  
   
                                                    2021   
22:                              SaO2% (BldA) [Mass   
fraction]                 97 %                      Seth Rahman MD  
Work Phone:   
[phone]                          [facility]  
Work Phone:   
[phone]  
   
                                                    2021   
14:                              Body mass index   
(BMI) [Ratio]             47.72 kg/m2               Seth Rahman MD  
Work Phone:   
[phone]                          [facility]  
Work Phone:   
[phone]  
   
                                                    2021   
14:          Body temperature    98.2 [degF]         Seth Rahman MD  
Work Phone:   
[phone]                          [facility]  
Work Phone:   
[phone]  
   
                                                    2021   
14:          Body weight         126.1 kg            Seth Rahman MD  
Work Phone:   
[phone]                          [facility]  
Work Phone:   
[phone]  
   
                                                    2021   
14:          Respiratory rate    16 /min             Seth Rahman MD  
Work Phone:   
[phone]                          [facility]  
Work Phone:   
[phone]  
   
                                                    2021   
16:          Body height         162.56 cm           Doreen Cabrera CNP  
Work Phone:   
[phone]                          [facility]  
Work Phone:   
[phone]  
   
                                                    2021   
16:                              Body mass index   
(BMI) [Ratio]             49.3 kg/m2                Doreen Cabrera CNP  
Work Phone:   
[phone]                          [facility]  
Work Phone:   
[phone]  
   
                                                    2021   
16:                              Body surface area   
Derived from formula      2.28 m2                   Doreen Cabrera CNP  
Work Phone:   
[phone]                          [facility]  
Work Phone:   
[phone]  
   
                                                    2021   
16:          Body temperature    97.4 [degF]         Doreen Cabrera CNP  
Work Phone:   
[phone]                          [facility]  
Work Phone:   
[phone]  
   
                                                    2021   
16:          Body weight         130.18 kg           Doreen Cabrera CNP  
Work Phone:   
[phone]                          [facility]  
Work Phone:   
[phone]  
   
                                                    2021   
16:                              Diastolic blood   
pressure                  98 mm[Hg]                 Doreen Cabrera CNP  
Work Phone:   
[phone]                          [facility]  
Work Phone:   
[phone]  
   
                                                    2021   
16:          Heart rate          85 /min             Doreen Cabrera CNP  
Work Phone:   
[phone]                          [facility]  
Work Phone:   
[phone]  
   
                                                    2021   
16:          Respiratory rate    18 /min             Doreen Cabrera CNP  
Work Phone:   
[phone]                          [facility]  
Work Phone:   
[phone]  
   
                                                    2021   
16:                              SaO2% (BldA) [Mass   
fraction]                 97 %                      Doreen Deborah CNP  
Work Phone:   
[phone]                          [facility]  
Work Phone:   
[phone]  
   
                                                    2021   
16:                              Systolic blood   
pressure                  144 mm[Hg]                Doreen Deborah CNP  
Work Phone:   
[phone]                          [facility]  
Work Phone:   
[phone]  
   
                                                    2021   
02:                              Diastolic blood   
pressure                  93 mm[Hg]                 Malika Shepherd MD  
Work Phone:   
[phone]                          [employer]  
Work Phone:   
[phone]  
   
                                                    2021   
02:          Heart rate          75 /min             Malika Shepherd MD  
Work Phone:   
[phone]                          [employer]  
Work Phone:   
[phone]  
   
                                                    2021   
02:          Respiratory rate    20 /min             Malika Shepherd MD  
Work Phone:   
[phone]                          [employer]  
Work Phone:   
[phone]  
   
                                                    2021   
02:                              SaO2% (BldA) [Mass   
fraction]                 100 %                     Malika Shepherd MD  
Work Phone:   
[phone]                          [employer]  
Work Phone:   
[phone]  
   
                                                    2021   
02:                              Systolic blood   
pressure                  150 mm[Hg]                Malika Shepherd MD  
Work Phone:   
[phone]                          [employer]  
Work Phone:   
[phone]  
   
                                                    2021   
20:          Body temperature    99.1 [degF]         Malika Shepherd MD  
Work Phone:   
[phone]                          [employer]  
Work Phone:   
[phone]  
   
                                                    2021   
11:          Body height         162.56 cm           Doreenpaola Cabrera CNP  
Work Phone:   
[phone]                          [facility]  
Work Phone:   
[phone]  
   
                                                    2021   
11:                              Body mass index   
(BMI) [Ratio]             48.9 kg/m2                Doreen Cabrera CNP  
Work Phone:   
[phone]                          [facility]  
Work Phone:   
[phone]  
   
                                                    2021   
11:                              Body surface area   
Derived from formula      2.27 m2                   Doreen Cabrera CNP  
Work Phone:   
[phone]                          [facility]  
Work Phone:   
[phone]  
   
                                                    2021   
11:          Body temperature    98.2 [degF]         Doreen Cabrera CNP  
Work Phone:   
[phone]                          [facility]  
Work Phone:   
[phone]  
   
                                                    2021   
11:          Body weight         129.28 kg           Doreen Cabrera CNP  
Work Phone:   
[phone]                          [facility]  
Work Phone:   
[phone]  
   
                                                    2021   
11:                              Diastolic blood   
pressure                  86 mm[Hg]                 Doreen Cabrera CNP  
Work Phone:   
[phone]                          [facility]  
Work Phone:   
[phone]  
   
                                                    2021   
11:          Heart rate          101 /min            Doreen Cabrera CNP  
Work Phone:   
[phone]                          [facility]  
Work Phone:   
[phone]  
   
                                                    2021   
11:                              SaO2% (BldA) [Mass   
fraction]                 98 %                      Doreen Cabrera CNP  
Work Phone:   
[phone]                          [facility]  
Work Phone:   
[phone]  
   
                                                    2021   
11:                              Systolic blood   
pressure                  124 mm[Hg]                Doreen Cabrera CNP  
Work Phone:   
[phone]                          [facility]  
Work Phone:   
[phone]  
   
                                                    2021   
16:          Body height         162.6 cm            [employer]  
Work Phone:   
[phone]                          [facility]  
Work Phone:   
[phone]  
   
                                                    2021   
16:                              Body mass index   
(BMI) [Ratio]             47.2 kg/m2                Rafael Andes DO  
Work Phone:   
[phone]                          [employer]  
Work Phone:   
[phone]  
   
                                                    2021   
16:          Body temperature    98.8 [degF]         Rafael Andes DO  
Work Phone:   
[phone]                          [employer]  
Work Phone:   
[phone]  
   
                                                    2021   
16:          Body weight         124.74 kg           Rafael Andes DO  
Work Phone:   
[phone]                          [employer]  
Work Phone:   
[phone]  
   
                                                    2021   
16:                              Diastolic blood   
pressure                  88 mm[Hg]                 Rafael Andes DO  
Work Phone:   
[phone]                          [employer]  
Work Phone:   
[phone]  
   
                                                    2021   
16:          Heart rate          78 /min             Rafael Andes DO  
Work Phone:   
[phone]                          [employer]  
Work Phone:   
[phone]  
   
                                                    2021   
16:          Respiratory rate    18 /min             Rafael Andes DO  
Work Phone:   
[phone]                          [employer]  
Work Phone:   
[phone]  
   
                                                    2021   
16:                              SaO2% (BldA) [Mass   
fraction]                 96 %                      Rafael Andes DO  
Work Phone:   
[phone]                          [employer]  
Work Phone:   
[phone]  
   
                                                    2021   
16:                              Systolic blood   
pressure                  138 mm[Hg]                Rafael Andes DO  
Work Phone:   
[phone]                          [employer]  
Work Phone:   
[phone]  
   
                                                    06-   
15:          Body height         162.56 cm           Doreen Cabrera CNP  
Work Phone:   
[phone]                          [facility]  
Work Phone:   
[phone]  
   
                                                    06-   
15:                              Body mass index   
(BMI) [Ratio]             49.5 kg/m2                Doreen Cabrera CNP  
Work Phone:   
[phone]                          [facility]  
Work Phone:   
[phone]  
   
                                                    06-   
15:                              Body surface area   
Derived from formula      2.29 m2                   Doreen Cabrera CNP  
Work Phone:   
[phone]                          [facility]  
Work Phone:   
[phone]  
   
                                                    06-   
15:          Body temperature    97.6 [degF]         Doreen Cabrera CNP  
Work Phone:   
[phone]                          [facility]  
Work Phone:   
[phone]  
   
                                                    06-   
15:          Body weight         130.82 kg           Doreen Cabrera CNP  
Work Phone:   
[phone]                          [facility]  
Work Phone:   
[phone]  
   
                                                    06-   
15:                              Diastolic blood   
pressure                  88 mm[Hg]                 Doreen Cabrera CNP  
Work Phone:   
[phone]                          [facility]  
Work Phone:   
[phone]  
   
                                                    06-   
15:          Heart rate          83 /min             Doreen Cabrera CNP  
Work Phone:   
[phone]                          [facility]  
Work Phone:   
[phone]  
   
                                                    06-   
15:          Respiratory rate    20 /min             Doreen Cabrera CNP  
Work Phone:   
[phone]                          [facility]  
Work Phone:   
[phone]  
   
                                                    06-   
15:                              SaO2% (BldA) [Mass   
fraction]                 98 %                      Doreen Cabrera CNP  
Work Phone:   
[phone]                          [facility]  
Work Phone:   
[phone]  
   
                                                    06-   
15:                              Systolic blood   
pressure                  130 mm[Hg]                Doreen Cabrera CNP  
Work Phone:   
[phone]                          [facility]  
Work Phone:   
[phone]  
   
                                                    2021   
15:                              Diastolic blood   
pressure                  108 mm[Hg]                Doreen Cabrera CNP  
Work Phone:   
[phone]                          [facility]  
Work Phone:   
[phone]  
   
                                                    2021   
15:                              Systolic blood   
pressure                  134 mm[Hg]                Doreen Cabrera CNP  
Work Phone:   
[phone]                          [facility]  
Work Phone:   
[phone]  
   
                                                    2021   
14:          Body height         162.56 cm           Doreen Cabrera CNP  
Work Phone:   
[phone]                          [facility]  
Work Phone:   
[phone]  
   
                                                    2021   
14:                              Body mass index   
(BMI) [Ratio]             49.8 kg/m2                Doreen Cabrera CNP  
Work Phone:   
[phone]                          [facility]  
Work Phone:   
[phone]  
   
                                                    2021   
14:                              Body surface area   
Derived from formula      2.29 m2                   Doreen Cabrera CNP  
Work Phone:   
[phone]                          [facility]  
Work Phone:   
[phone]  
   
                                                    2021   
14:          Body temperature    96.4 [degF]         Doreen Cabrera CNP  
Work Phone:   
[phone]                          [facility]  
Work Phone:   
[phone]  
   
                                                    2021   
14:          Body weight         131.54 kg           Doreen Cabrera CNP  
Work Phone:   
[phone]                          [facility]  
Work Phone:   
[phone]  
   
                                                    2021   
14:                              Diastolic blood   
pressure                  108 mm[Hg]                Doreen Cabrera CNP  
Work Phone:   
[phone]                          [facility]  
Work Phone:   
[phone]  
   
                                                    2021   
14:          Heart rate          97 /min             Doreen Cabrera CNP  
Work Phone:   
[phone]                          [facility]  
Work Phone:   
[phone]  
   
                                                    2021   
14:          Respiratory rate    18 /min             Doreen Deborah CNP  
Work Phone:   
[phone]                          [facility]  
Work Phone:   
[phone]  
   
                                                    2021   
14:                              SaO2% (BldA) [Mass   
fraction]                 98 %                      Doreen Cabrera CNP  
Work Phone:   
[phone]                          [facility]  
Work Phone:   
[phone]  
   
                                                    2021   
14:                              Systolic blood   
pressure                  150 mm[Hg]                Doreen Cabrera CNP  
Work Phone:   
[phone]                          [facility]  
Work Phone:   
[phone]  
  
  
  
Encounters  
  
  
                          Encounter Date Encounter Type Care Provider Facility  
   
                                                    Start: 10-  
End: 10-     ambulatory          SYEDA PATTERSON         Not Available  
   
                                                    Start: 2024  
End: 2024           [facility] visit, estab pt      Radha Young LSW  
Work Phone:   
[phone]                          [facility]  
Work Phone:   
[phone]  
   
                                                    Start: 2024  
End: 2024           [facility] visit, estab pt      Leonie Short LISWS  
Work Phone:   
[phone]                          [facility]  
Work Phone:   
[phone]  
   
                                                    Start: 2024  
End: 2024                         Encounter for   
preprocedural laboratory   
examination                             Doreen Cabrera CNP  
Work Phone:   
[phone]                          [facility]  
Work Phone:   
[phone]  
   
                                                    Start: 2024  
End: 2024           [facility] visit, estab pt      Doreen Cabrera CNP  
Work Phone:   
[phone]                          [facility]  
Work Phone:   
[phone]  
   
                                                    Start: 2024  
End: 2024           [facility] visit, estab pt      Leonie Short LISWS  
Work Phone:   
[phone]                          [facility]  
Work Phone:   
[phone]  
   
                                                    Start: 2024  
End: 2024           [facility] visit, estab pt      Leonie Short LISWS  
Work Phone:   
[phone]                          [facility]  
Work Phone:   
[phone]  
   
                                                    Start: 2024  
End: 2024                         Emergency department   
patient visit             MIKKISJ CARNES CYNTHIA          [facility]  
   
                                                    Start: 2024  
End: 2024           [facility]QHC visit, estab pt      Leonienani HUTCHINSON  
Work Phone:   
[phone]                          [facility]  
Work Phone:   
[phone]  
   
                                                    Start: 2024  
End: 2024           General                   Doreen Cabrera CNP  
Work Phone:   
[phone]                          [facility]  
Work Phone:   
[phone]  
   
                                                    Start: 2023  
End: 2023           General                   Brandy Beaulieu DDS  
Work Phone:   
[phone]                          [facility]  
Work Phone:   
[phone]  
   
                                                    Start: 2023  
End: 2023           General                   Brandy Beaulieu DDS  
Work Phone:   
[phone]                          [facility]  
Work Phone:   
[phone]  
   
                                                    Start: 2023  
End: 2023           [facility] visit, estab pt      Doreen Cabrera CNP  
Work Phone:   
[phone]                          [facility]  
Work Phone:   
[phone]  
   
                                        Start: 07-   comprehensive oral   
evaluation - new or   
established patient                     Brandy Beaulieu DDS  
Work Phone:   
[phone]                          [facility]  
   
                                                    Start: 07-  
End: 07-           General                   Yue Ronquillo RDH  
Work Phone:   
[phone]                          [facility]  
Work Phone:   
[phone]  
   
                                                    Start: 10-  
End: 10-     ambulatory          DOREEN CABRERA      [facility]  
   
                                                    Start: 10-  
End: 10-                         Subsequent hospital visit   
by physician                            Doreen Cabrera APRN -   
CNP  
Work Phone:   
[phone]                          [facility]   
CTR  
   
                                                    Start: 10-  
End: 10-           FQHC visit, estab pt      Doreen Cabrera CNP  
Work Phone:   
[phone]                          [facility]  
Work Phone:   
[phone]  
   
                                                    Start: 2022  
End: 2022           General                   Haylie Aguilar   
[facility]  
Work Phone:   
[phone]                          [facility]  
Work Phone:   
[phone]  
   
                                                    Start: 2022  
End: 2022           ambulatory                Julieth Aliya CNP  
Work Phone:   
[phone]                          [facility]  
Work Phone:   
[phone]  
   
                                                    Start: 2022  
End: 2022           General                   Julieth Aliya CNP  
Work Phone:   
[phone]                          [facility]  
Work Phone:   
[phone]  
   
                                                    Start: 2022  
End: 2022           FQHC visit, estab pt      Haylienicanor Aguilar   
[facility]  
Work Phone:   
[phone]                          [facility]  
Work Phone:   
[phone]  
   
                                                    Start: 2022  
End: 2022           FQHC visit, estab pt      Haylie Aguilar   
[facility]  
Work Phone:   
[phone]                          [facility]  
Work Phone:   
[phone]  
   
                                                    Start: 2022  
End: 2022           ambulatory                Julieth Aliya CNP  
Work Phone:   
[phone]                          [facility]  
Work Phone:   
[phone]  
   
                                                    Start: 2022  
End: 2021           General                   Juileth Aliya CNP  
Work Phone:   
[phone]                          [facility]  
Work Phone:   
[phone]  
   
                                                    Start: 10-  
End: 10-     ambulatory          [facility]  
l  
   
                                                    Start: 10-  
End: 10-                         Subsequent hospital visit   
by physician              [facility] Ekg Monitor Rm        [facility]  
   
                                        Comment on above:   Tachycardia   
   
                                                    Start: 2021  
End: 2021           FQHC visit, estab pt      Avis Felder   
[facility]  
Work Phone:   
[phone]                          [facility]  
Work Phone:   
[phone]  
   
                                                    Start: 2021  
End: 2021           FQHC visit, estab pt      Avis Felder   
[facility]  
Work Phone:   
[phone]                          [facility]  
Work Phone:   
[phone]  
   
                                                    Start: 09-  
End: 2021                         Evaluation and management   
of inpatient              DAVID BARNES [facility]  
   
                                                    Start: 2021  
End: 09-                         Emergency department   
[facility]             [facility]  
   
                                                    Start: 2021  
End: 2021                         Emergency department   
patient visit                           Seth Rahman MD  
Work Phone:   
[phone]                          [facility]   
ED  
   
                                        Comment on above:   Bipolar 1 disorder (  
HCC) (Primary Dx);  
Homicidal ideation   
   
                                                    Start: 2021  
End: 2021           FQ visit, estab pt      Leonie HUTCHINSON  
Work Phone:   
[phone]                          [facility]  
Work Phone:   
[phone]  
   
                                                    Start: 2021  
End: 2021                         Emergency department   
patient visit             MALIKA DARNELL [facility]  
   
                                                    Start: 2021  
End: 2021                         Emergency department   
patient visit                           Malika Shepherd MD  
Work Phone:   
[phone]                          [facility]   
ED  
   
                                        Comment on above:   Anxiety state (Prima  
ry Dx)   
   
                                                    Start: 2021  
End: 2021           ambulatory                Pamela Maguire CNP  
Work Phone:   
[phone]                          [facility]  
Work Phone:   
[phone]  
   
                                                    Start: 2021  
End: 2021           General                   Pamela Maguire CNP  
Work Phone:   
[phone]                          [facility]  
Work Phone:   
[phone]  
   
                                                    Start: 2021  
End: 2021           FQHC visit, estab pt      Leonie Garrett LISSARAH  
Work Phone:   
[phone]                          [facility]  
Work Phone:   
[phone]  
   
                                                    Start: 2021  
End: 2021           FQHC visit, estab pt      Leonie Garrett LISWS  
Work Phone:   
[phone]                          [facility]  
Work Phone:   
[phone]  
   
                                                    Start: 2021  
End: 2021                         Emergency department   
patient visit             [facility]  
   
                                                    Start: 2021  
End: 2021                         Emergency department   
patient visit                           [facility] DO  
Work Phone:   
[phone]                          [facility]   
ED  
   
                                        Comment on above:   Depression with suic  
idal ideation (Primary Dx)   
   
                                                    Start: 06-  
End: 06-           FQHC visit, estab pt      Leonie Garrett LISSARAH  
Work Phone:   
[phone]                          [facility]  
Work Phone:   
[phone]  
   
                                                    Start: 06-  
End: 06-           FQHC visit, estab pt      Leonie Short LISWS  
Work Phone:   
[phone]                          [facility]  
Work Phone:   
[phone]  
   
                                                    Start: 2021  
End: 2021           FQHC visit, estab pt      Leonie Short LISWS  
Work Phone:   
[phone]                          [facility]  
Work Phone:   
[phone]  
   
                                                    Start: 2021  
End: 2021           FQHC visit new patient    Doreen Cabrera CNP  
Work Phone:   
[phone]                          [facility]  
Work Phone:   
[phone]  
   
                                                    Start: 08-  
End: 08-                         Patient encounter   
procedure                 ANYA OLIVARES           Facility:  
   
                                                    Start: 2017  
End: 2017                         Emergency department   
patient visit             MD HALLE ANGEL     Facility:[facility]  
  
  
  
Procedures  
  
  
                          Date         Procedure    Procedure Detail Performing   
Clinician  
   
                                        Start: 09-   Application of silve  
r   
diamine fluoride by   
physician                                           Doreen Cabrera CNP  
Work Phone:   
[phone]  
   
                                        Start: 2024   Behav assmt w/score   
&   
docd/stand instrument                               Radha Young LSW  
Work Phone:   
[phone]  
   
                                        Start: 2024   Eye img vld mtch dx   
7   
stnd fld w/o evc rtnopthy                           Doreen Cabrera CNP  
Work Phone:   
[phone]  
   
                                        Start: 2024   Foot examination   
performed                                           Doreen Cabrera CNP  
Work Phone:   
[phone]  
   
                                        Start: 2024   Fundus photography   
w/interpretation & report                           Doreen Cabrera CNP  
Work Phone:   
[phone]  
   
                                        Start: 2024   Gluc bld gluc mntr d  
ev   
cleared fda spec home use                           Doreen Cabrera CNP  
Work Phone:   
[phone]  
   
                                        Start: 2024   Most recent diastoli  
c   
blood pressure 80-89 mm   
hg                                                  Doreen Cabrera CNP  
Work Phone:   
[phone]  
   
                                        Start: 2024   Most recent systolic  
   
blood press 130-139mm hg                            Doreen Cabrera CNP  
Work Phone:   
[phone]  
   
                                        Start: 2024   Collection venous bl  
ood   
venipuncture                                        Doreen Cabrera CNP  
Work Phone:   
[phone]  
   
                                        Start: 2024   Current tobacco non-  
user   
cad cap copd pv dm                                  Doreen Cabrera CNP  
Work Phone:   
[phone]  
   
                                        Start: 2024   Gluc bld gluc mntr d  
ev   
cleared fda spec home use                           Doreen Cabrera CNP  
Work Phone:   
[phone]  
   
                                        Start: 2024   Most recent hg a1c>e  
qual   
to 7.0%&<8.0%                                       Doreen Cabrera CNP  
Work Phone:   
[phone]  
   
                          Start: 2024 Pneumococcal Prevnar 20               
 Doreen Cabrera CNP  
Work Phone:   
[phone]  
   
                                        Start: 2024   Psychotherapy w/tabatha  
ent   
30 minutes                                          Leonie Short LISWS  
Work Phone:   
[phone]  
   
                                        Start: 2024   Behav assmt w/score   
&   
docd/stand instrument                               Leonie Short LISWS  
Work Phone:   
[phone]  
   
                                        Start: 2024   Current tobacco non-  
user   
cad cap copd pv dm                                  Doreen Cabrera CNP  
Work Phone:   
[phone]  
   
                                        Start: 2024   Most recent diastoli  
c   
blood pressure 80-89 mm   
hg                                                  Doreen Cabrera CNP  
Work Phone:   
[phone]  
   
                                        Start: 2024   Most recent systolic  
   
blood press 130-139mm hg                            Doreen Cabrera CNP  
Work Phone:   
[phone]  
   
                                        Start: 2024   Pt scrnd tobacco use  
 rcvd   
tobacco cessation talk                              Doreen Cabrera CNP  
Work Phone:   
[phone]  
   
                                        Start: 2024   Current tobacco non-  
user   
cad cap copd pv dm                                  Doreen Cabrera CNP  
Work Phone:   
[phone]  
   
                                        Start: 2024   Gluc bld gluc mntr d  
ev   
cleared fda spec home use                           Doreen Cabrera CNP  
Work Phone:   
[phone]  
   
                                        Start: 2024   Most recent diastoli  
c   
blood pressure 80-89 mm   
hg                                                  Doreen Cabrera CNP  
Work Phone:   
[phone]  
   
                                        Start: 2024   Most recent hemoglob  
in   
a1c level < 7.0%                                    Doreen Cabrera CNP  
Work Phone:   
[phone]  
   
                                        Start: 2024   Most recent systolic  
   
blood press 130-139mm hg                            Doreen Cabrera CNP  
Work Phone:   
[phone]  
   
                                        Start: 2024   Psychotherapy w/tabatha  
ent   
30 minutes                                          Leonie HUTCHINSON  
Work Phone:   
[phone]  
   
                                        Start: 2024   Pt scrnd tobacco use  
 rcvd   
tobacco cessation talk                              Doreen Cabrera CNP  
Work Phone:   
[phone]  
   
                                Start: 2024 Screening for disorder Visit F  
or: Screening   
For Disorder                            Leonie HUTCHINSON  
Work Phone:   
[phone]  
   
                                        Start: 2024   Venereal disease   
screening                               Visit For: Screening   
Exam Std                                Doreen Cabrera CNP  
Work Phone:   
[phone]  
   
                                        Start: 2023   resin-based composit  
e -   
one surface, posterior                              Brandy Beaulieu DDS  
Work Phone:   
[phone]  
   
                                        Start: 2023   resin-based composit  
e -   
one surface, posterior                              Dougeen Brittnee DDS  
Work Phone:   
[phone]  
   
                                        Start: 2023   Hemoglobin glycosyla  
geraldine   
a1c                                                 Doreen Cabrera [facility]  
Work Phone:   
[phone]  
   
                                        Start: 2023   Most recent diastoli  
c   
blood pressure < 80 mm hg                           Doreen Cabrera [facility]  
Work Phone:   
[phone]  
   
                                        Start: 2023   Most recent hemoglob  
in   
a1c level < 7.0%                                    Doreen Cabrera [facility]  
Work Phone:   
[phone]  
   
                                        Start: 2023   Most recent systolic  
   
blood pressure <130 mm hg                           Doreen Cabrera [facility]  
Work Phone:   
[phone]  
   
                                        Start: 07-   caries risk assessme  
nt   
and documentation, with a   
finding of high risk                                Yue Ronquillo [facility]  
Work Phone:   
[phone]  
   
                                        Start: 07-   intraoral - complete  
   
series of radiographic   
images                                              Yue Ronquillo [facility]  
Work Phone:   
[phone]  
   
                                        Start: 07-   panoramic radiograph  
ic   
image                                               Yue Ronquillo RD  
Work Phone:   
[phone]  
   
                          Start: 07- prophylaxis - adult              Tommy Ronquillo RD  
Work Phone:   
[phone]  
   
                                        Start: 10-   Most recent diastoli  
c   
blood pressure 80-89 mm   
hg                                                  Doreen Cabrera [facility]  
Work Phone:   
[phone]  
   
                                        Start: 10-   Most recent systolic  
   
blood pressure <130 mm hg                           Doreen Cabrera [facility]  
Work Phone:   
[phone]  
   
                                        Start: 2022   Psychotherapy w/tabatha  
ent   
30 minutes                                          Haylie Aguilar [facility]  
Work Phone:   
[phone]  
   
                                        Start: 2022   Most recent diastoli  
c   
blood pressure 80-89 mm   
hg                                                  Julieth Piasno CNP  
Work Phone:   
[phone]  
   
                                        Start: 2022   Most recent systolic  
   
blood pressure <130 mm hg                           Julieth Aliya CNP  
Work Phone:   
[phone]  
   
                                        Start: 2022   Psychotherapy w/tabatha  
ent   
30 minutes                                          Haylie Aguilar [facility]  
Work Phone:   
[phone]  
   
                                        Start: 2022   Most recent diastol   
blood   
pres >/equal 90 mm hg                               Doreen Cabrera CNP  
Work Phone:   
[phone]  
   
                                        Start: 2022   Most recent systolic  
   
blood pressure <130 mm hg                           Doreen Cabrera CNP  
Work Phone:   
[phone]  
   
                                        Start: 2022   Psychotherapy w/tabatha  
ent   
30 minutes                                          Haylie Aguilar [facility]  
Work Phone:   
[phone]  
   
                                        Start: 2022   Hemoglobin glycosyla  
geraldine   
a1c                                                 Julieth Pisano [facility]  
Work Phone:   
[phone]  
   
                                        Start: 2022   Most recent diastol   
blood   
pres >/equal 90 mm hg                               Julieth Pisano [facility]  
Work Phone:   
[phone]  
   
                                        Start: 2022   Most recent hemoglob  
in   
a1c level < 7.0%                                    Julieth Pisano [facility]  
Work Phone:   
[phone]  
   
                                        Start: 2022   Most recent systolic  
   
blood pres>/equal 140 mm   
hg                                                  Julieth Pisano CNP  
Work Phone:   
[phone]  
   
                                        Start: 2022   Psychotherapy w/tabatha  
ent   
30 minutes                                          Haylie Aguilar [facility]  
Work Phone:   
[phone]  
   
                                        Start: 2021   Antibody hiv-1&hiv-2  
   
single result                                       Doreen Cabrera CNP  
Work Phone:   
[phone]  
   
                                        Start: 2021   Most recent diastoli  
c   
blood pressure 80-89 mm   
hg                                                  Doreen Cabrera CNP  
Work Phone:   
[phone]  
   
                                        Start: 2021   Most recent systolic  
   
blood press 130-139mm hg                            Doreen Cabrera CNP  
Work Phone:   
[phone]  
   
                                        Start: 2021   Psychotherapy w/tabatha  
ent   
30 minutes                                          Avis Felder   
[facility]  
Work Phone:   
[phone]  
   
                                        Start: 2021   Pt scrnd tobacco use  
 rcvd   
tobacco cessation talk                              Doreen Cabrera CNP  
Work Phone:   
[phone]  
   
                          Start: 2021 COVID-19, RAPID              Seth Rahman MD  
Work Phone:   
[phone]  
   
                          Start: 2021 Drug screen class list a              
  Seth Rahman MD  
Work Phone:   
[phone]  
   
                                        Start: 2021   Urnls dip stick/tabl  
et   
reagent auto microscopy                             Seth Rahman MD  
Work Phone:   
[phone]  
   
                          Start: 2021 Assay of acetaminophen                
Seth Rahman MD  
Work Phone:   
[phone]  
   
                          Start: 2021 Assay of ethanol              Meghna Rahman MD  
Work Phone:   
[phone]  
   
                          Start: 2021 Assay of salicylate              Cip  
alysia Rahman MD  
Work Phone:   
[phone]  
   
                                        Start: 2021   Comprehensive metabo  
lic   
panel                                               Seth Rahman MD  
Work Phone:   
[phone]  
   
                                        Start: 2021   Ecg routine ecg w/le  
ast   
12 lds w/i&r                                        Seth Rahman MD  
Work Phone:   
[phone]  
   
                                        Start: 2021   Most recent diastoli  
c   
blood pressure < 80 mm hg                           Doreen Cabrera [facility]  
Work Phone:   
[phone]  
   
                                        Start: 2021   Most recent systolic  
   
blood pressure <130 mm hg                           Doreen Cabrera [facility]  
Work Phone:   
[phone]  
   
                                        Start: 2021   Psychotherapy w/tabatha  
ent   
30 minutes                                          Leonie Short LISWS  
Work Phone:   
[phone]  
   
                          Start: 2021 COVID-19, RAPID              Malika Shepherd MD  
Work Phone:   
[phone]  
   
                          Start: 2021 Assay of acetaminophen                
Malika Shepherd MD  
Work Phone:   
[phone]  
   
                          Start: 2021 Assay of ethanol              Malika Shepherd MD  
Work Phone:   
[phone]  
   
                          Start: 2021 Assay of salicylate              Maykel Shepherd MD  
Work Phone:   
[phone]  
   
                                        Start: 2021   Comprehensive metabo  
lic   
panel                                               Malika Shepherd MD  
Work Phone:   
[phone]  
   
                          Start: 2021 Drug screen class list a              
  Malika Shepherd MD  
Work Phone:   
[phone]  
   
                                        Start: 2021   Urinalysis microscop  
ic   
only                                                Malika Shepherd MD  
Work Phone:   
[phone]  
   
                                        Start: 2021   Urnls dip stick/tabl  
et   
rgnt auto w/o microscopy                            Malika Shepherd MD  
Work Phone:   
[phone]  
   
                                        Start: 2021   Most recent diastoli  
c   
blood pressure 80-89 mm   
hg                                                  Doreen Deborah CNP  
Work Phone:   
[phone]  
   
                                        Start: 2021   Most recent systolic  
   
blood pressure <130 mm hg                           Doreen Deborah CNP  
Work Phone:   
[phone]  
   
                                        Start: 2021   Psychotherapy w/tabatha  
ent   
30 minutes                                          Leonie Short LISWS  
Work Phone:   
[phone]  
   
                                        Start: 2021   Ecg routine ecg w/le  
ast   
12 lds w/i&r                                        Rafael Andes DO  
Work Phone:   
[phone]  
   
                          Start: 2021 Assay of acetaminophen                
Rafael Andes DO  
Work Phone:   
[phone]  
   
                          Start: 2021 Assay of ethanol              Rafael  
 Andes DO  
Work Phone:   
[phone]  
   
                          Start: 2021 Assay of salicylate              Jus  
tin Andes DO  
Work Phone:   
[phone]  
   
                                        Start: 2021   Comprehensive metabo  
lic   
panel                                               Rafael Andes DO  
Work Phone:   
[phone]  
   
                          Start: 2021 SPECIMEN REJECTION              Just  
in Andes DO  
Work Phone:   
[phone]  
   
                          Start: 2021 COVID-19, RAPID              Rafael   
Andes DO  
Work Phone:   
[phone]  
   
                          Start: 2021 Drug screen class list a              
  Rafael Andes DO  
Work Phone:   
[phone]  
   
                                        Start: 2021   Urnls dip stick/tabl  
et   
reagent auto microscopy                             Seth Rahman MD  
Work Phone:   
[phone]  
   
                                        Start: 06-   Most recent diastoli  
c   
blood pressure 80-89 mm   
hg                                                  Doreen Cabrera CNP  
Work Phone:   
[phone]  
   
                                        Start: 06-   Most recent systolic  
   
blood pressure <130 mm hg                           Doreen Cabrera CNP  
Work Phone:   
[phone]  
   
                                        Start: 06-   Psychotherapy w/tabatha  
ent   
30 minutes                                          Leonie Short LISWS  
Work Phone:   
[phone]  
   
                                        Start: 2021   Ear Pressure Equaliz  
ation   
Tube, Insertion,   
Bilaterally                                         Doreen Cabrera CNP  
Work Phone:   
[phone]  
   
                                        Start: 2021   Most recent diastol   
blood   
pres >/equal 90 mm hg                               Doreen Cabrera CNP  
Work Phone:   
[phone]  
   
                                        Start: 2021   Most recent systolic  
   
blood pres>/equal 140 mm   
hg                                                  Doreen Cabrera CNP  
Work Phone:   
[phone]  
   
                                        Start: 2021   Pt-focused hlth risk  
   
assmt score doc stnd   
instrm                                              Doreen Cabrera CNP  
Work Phone:   
[phone]  
   
                          Start: 2021 Tonsillectomy              Doreenpaola cuevas CNP  
Work Phone:   
[phone]  
  
  
  
Plan of Treatment  
  
  
                          Date         Care Activity Detail       Author  
   
                                Start: 10- FQHC visit, estab pt Medical E  
stablished   
Patient                                 [facility]  
Work Phone:   
[phone]  
   
                                        Start: 10-   CBC W Auto Different  
ial   
panel - Blood                                       [facility]  
   
                                Start: 2024 FQHC visit, estab pt Medical E  
stablished   
Patient                                 [facility]  
Work Phone:   
[phone]  
   
                                                    Start: 2024  
End: 2024                         Patient education based   
on identified need                                  [facility]  
   
                          Start: 2024              US Transvaginal ([id number])  
 [facility]  
   
                                                    Start: 2024  
End: 2024                         Patient education based   
on identified need                                  [facility]  
   
                                Start: 2024 FQHC visit, estab pt Medical E  
stablished   
Patient                                 [facility]  
Work Phone:   
[phone]  
   
                                                    Start: 2024  
End: 2024                         Patient education based   
on identified need                                  [facility]  
   
                                        Start: 2024   Ferritin [Mass/volum  
e]   
in Serum or Plasma                                  [facility]  
   
                                                    Start: 2024  
End: 2024                         Patient education based   
on identified need                                  [facility]  
   
                                Start: 2024 FQHC visit, estab pt Medical E  
stablished   
Patient                                 [facility]  
Work Phone:   
[phone]  
   
                                Start: 2024                 All 3 Urine Te  
st   
Gonorrhea-Chlamydia-Tri  
[facility]  
   
                                                    Start: 2024  
End: 2024                         Patient education based   
on identified need                                  [facility]  
   
                                                    Start: 2023  
End: 2023                         Patient education based   
on identified need                                  [facility]  
   
                          Start: 2023              Psychiatry   [facility]  
Work Phone:   
[phone]  
   
                                        Comment on above:   Note: Please make a   
referral to: see if dr alonso accepting   
now,   
otherwise ok with arminda or edy   
   
                                Start: 2023 FQHC visit, estab pt Medical E  
stablished   
Patient                                 [facility]  
Work Phone:   
[phone]  
   
                                                    Start: 10-  
End: 10-                         Patient education based   
on identified need                                  [facility]  
   
                                Start: 2022                 All 3 Urine Te  
st   
Gonorrhea-Chlamydia-Tri  
[facility]  
   
                                Start: 09- FQHC visit, estab pt Medical E  
stablished   
Patient                                 [facility]  
Work Phone:   
[phone]  
   
                                                    Start: 2022  
End: 2022                         Patient education based   
on identified need                      [facility] offered active   
listening and   
supportive feedback;   
normalized emotions and   
feelings, also provided   
pt time to process any   
current stressors.   
~Promoted and   
encouraged   
follow-through with   
scheduling psychiatric   
services                                [facility]  
   
                                                    Start: 2022  
End: 2022                         Patient education based   
on identified need                                  [facility]  
   
                          Start: 2022                           [facility]  
   
                          Start: 2022 FQHC visit, estab pt              Ti  
in [facility]  
Work Phone:   
[phone]  
   
                                        Comment on above:   Note: Please make a   
referral to: request dr alonso   
   
                                                    Start: 2022  
End: 2022                         Patient education based   
on identified need                                  [facility]  
   
                                                    Start: 2022  
End: 2022                         Patient education based   
on identified need                      [facility] pt to   
[facility]   
of care ~[facility] offered   
active listening and   
supportive feedback;   
normalized emotions and   
feelings, also provided   
pt time to process any   
current stressors ~P   
discussed potential   
benefits of counseling   
and supported   
re-engaging, as needed.   
~[facility] encouraged pt to   
continue to make time   
to implement self-care   
regimen and use coping   
methods, as needed                      [facility]  
   
                                        Start: 2022   CBC W Auto Different  
ial   
panel - Blood                                       [facility]  
   
                                        Start: 2022   Lipid 1996 panel - S  
wayne   
or Plasma                 LIPID PROFILE             [facility]  
   
                                                    Start: 2022  
End: 2022                         Patient education based   
on identified need                                  [facility]  
   
                          Start: 2022 Influenza vaccination Flu vaccine (#  
1) [facility]  
   
                                        Start: 2021   COVID-19 Vaccine (3   
-   
Booster for Pfizer   
series)                                 COVID-19 Vaccine (3 -   
Booster for Pfizer   
series)                                 [facility]  
   
                          Start: 10-                           [facility]  
   
                                Start: 2021 FQ visit, estab pt Medical E  
stablished   
Patient                                 [facility]  
Work Phone:   
[phone]  
   
                          Start: 2021                           [facility]  
Work Phone:   
[phone]  
   
                                        Comment on above:   Note: Please make a   
referral to: [facility] -   
[address] [address]MG: [phone]:   
[phone]   
   
                                                            Note: Please make a   
referral to: Addison bailey , no longer wants to   
see vinny   
   
                                                    Start: 2021  
End: 2021                         Patient education based   
on identified need                                  [facility]  
   
                          Start: 2021 Influenza vaccination              [facility]  
Work Phone:   
[phone]  
   
                                                    Start: 2021  
End: 2021                         Patient education based   
on identified need                                  [facility]  
   
                          Start: 2021              SARS-CoV-2, [facility]   
[facility]  
   
                                                    Start: 2021  
End: 2021                         Patient education based   
on identified need                                  [facility]  
   
                                                    Start: 06-  
End: 06-                         Patient education based   
on identified need                                  [facility]  
   
                                                    Start: 2021  
End: 2021                         Patient education based   
on identified need                                  [facility]  
   
                                        Start: 2020   DTaP/Tdap/Td vaccine  
 (7   
- Td or Tdap)                           DTaP/Tdap/Td vaccine (7   
- Td or Tdap)                           [facility]  
   
                                        Start: 2020   Screening for malign  
ant   
neoplasm of cervix                                  [facility]  
   
                                        Start: 2018   DTaP/Tdap/Td vaccine  
 (1   
- Tdap)                                 DTaP/Tdap/Td vaccine (1   
- Tdap)                                 [facility]  
Work Phone:   
[phone]  
   
                          Start: 2017 Hepatitis C screening Hepatitis C sc  
reen [facility]  
   
                                        Start: 2015   Screening for Chlamy  
argelia   
trachomatis                                         [facility]  
   
                          Start: 2014 HIV screening HIV screen   [facility]  
Work Phone:   
[phone]  
   
                          Start: 2011 COVID-19 Vaccine (1) COVID-19 Vaccin  
e (1) [facility]  
Work Phone:   
[phone]  
   
                          Start: 2011 Depression Monitoring Depression [facility]  
   
                                        Start: 2010   HPV vaccine (1 - 2-d  
ose   
series)                                 HPV vaccine (1 - 2-dose   
series)                                 [facility]  
   
                                        Start: 2000   Varicella vaccine (1  
 of   
2 - 2-dose childhood   
series)                                 Varicella vaccine (1 of   
2 - 2-dose childhood   
series)                                 [facility]  
   
                          Start: 1999 Hepatitis C screening Hepatitis C sc  
reen [facility]  
Work Phone:   
[phone]  
   
                                                      
End: 10-                         C.trachomatis   
N.gonorrhoeae DNA, Urine                            [facility]  
Work Phone:   
[phone]  
   
                                        Comment on above:   Once for 1 Occurrenc  
es starting 10/21/2022 until 10/21/2022   
   
                                                EKG 12 Lead     EKG 12 Lead ECG   
STAT   
2021 3:18 PM EDT                  [facility]  
Work Phone:   
[phone]  
   
                                                      
End: 10-                         Trichomonas Vaginali,   
Molecular                                           [facility]  
Work Phone:   
[phone]  
   
                                        Comment on above:   Once for 1 Occurrenc  
es starting 10/21/2022 until 10/21/2022   
  
  
  
Immunizations  
  
  
                      Immunization Date Immunization Notes      Care Provider Mandeep deshpande  
   
                                        2024          influenza, injectabl  
e,   
quadrivalent,   
preservative free;   
Translations: [Fluarix]                             [facility] Cab[facility]  
Work Phone:   
[phone]                          [facility]  
   
                                        Comment on above:   Note: Patient tolera  
geraldine well. No signs or symptoms of adverse   
reactions. Patient waited a minimum of 15 minutes.   
   
                                        2024          Imm.Admin. over 18 y  
rs   
Any Route FIRST Injection   
(Rendering physician   
modifier)                                           [facility]  
Work Phone:   
[phone]                          [facility]  
   
                                        2024          Human Papillomavirus  
   
9-valent vaccine;   
Translations: [GARDASIL   
9]                                                  [facility]  
Work Phone:   
[phone]                          [facility]  
   
                                        2024          pneumococcal vaccine  
,   
unspecified formulation                             [facility]  
Work Phone:   
[phone]                          [facility]  
   
                                        Comment on above:   Note: Patient tolera  
geraldine well. No signs or symptoms of adverse   
reactions. Patient waited a minimum of 15 minutes.   
   
                                        2024          Imm.Admin.Through 17  
 yrs   
Any Route FIRST Injection                           Doreen Cabrera CNP  
Work Phone:   
[phone]                          [facility]  
   
                                        2024          Ea.Addnl.Imm.Admin.T  
hroug  
h 17 yrs Any Route                                  Doreen Cabrera CNP  
Work Phone:   
[phone]                          [facility]  
   
                                        2024          VFC Imm. Admin. thro  
ugh   
17 yrs Any Route per VFC   
Injection (Signi/Sep   
Eval. & Man.)                                       [facility]  
Work Phone:   
[phone]                          [facility]  
   
                                        10-          influenza, injectabl  
e,   
quadrivalent,   
preservative free;   
Translations: [Fluarix]                             [facility]  
Work Phone:   
[phone]                          [facility]  
   
                                        Comment on above:   Note: Patient tolera  
geraldine well. No signs or symptoms of adverse   
reactions. Patient waited a minimum of 15 minutes.   
   
                                        10-          Imm.Admin. over 18 y  
rs   
Any Route FIRST Injection                           [facility]  
Work Phone:   
[phone]                          [facility]  
   
                                        2021          1st Dose PFIZER COVI  
D-19   
Vaccine                                             [facility]  
Work Phone:   
[phone]                          [facility]  
   
                                        2021          1st Dose PFIZER COVI  
D-19   
Vaccine                                             [facility]  
Work Phone:   
[phone]                          [facility]  
   
                                        2016          meningococcal   
oligosaccharide (groups   
A, C, Y and W-135)   
diphtheria toxoid   
conjugate vaccine (MCV4O)                           [facility]  
Work Phone:   
[phone]                          [facility]  
   
                                        2010          tetanus toxoid, redu  
kyleigh   
diphtheria toxoid, and   
acellular pertussis   
vaccine, adsorbed                                   [facility]  
Work Phone:   
[phone]                          [facility]  
   
                                        2004          measles, mumps and   
rubella virus vaccine                               [facility]  
Work Phone:   
[phone]                          [facility]  
   
                                        2004          poliovirus vaccine,   
inactivated                                         [facility]  
Work Phone:   
[phone]                          [facility]  
   
                                        2000          measles, mumps and   
rubella virus vaccine                               [facility]  
Work Phone:   
[phone]                          [facility]  
   
                                        2000          poliovirus vaccine,   
inactivated                                         [facility]  
Work Phone:   
[phone]                          [facility]  
   
                                        2000          haemophilus influenz  
ae   
type b conjugate and   
Hepatitis B vaccine                                 [facility]  
Work Phone:   
[phone]                          [facility]  
   
                                        1999          poliovirus vaccine,   
inactivated                                         Doreen Deborah CNP  
Work Phone:   
[phone]                          [facility]  
   
                                        1999          diphtheria, tetanus   
toxoids and pertussis   
vaccine                                             Doreen Cabrera CNP  
Work Phone:   
[phone]                          [facility]  
   
                                        1999          trivalent poliovirus  
   
vaccine, live, oral                                 Doreen Cabrera CNP  
Work Phone:   
[phone]                          [facility]  
   
                                        1999          hepatitis B vaccine,  
   
pediatric or   
pediatric/adolescent   
dosage                                              Doreen Cabrera CNP  
Work Phone:   
[phone]                          [facility]  
   
                                        1999          hepatitis B vaccine,  
   
pediatric or   
pediatric/adolescent   
dosage                                              Doreen Cabrera CNP  
Work Phone:   
[phone]                          [facility]  
  
  
  
Payers  
  
  
                          Date         Payer Category Payer        Policy ID  
   
                          2021   Unknown                   [id number]   
[id number]  
   
                          1999   Unknown                   [id number] 2.16.84  
[id number]  
   
                          1999   Unknown                   [id number] [id number]  
[id number]  
   
                          1999   Unknown                   [id number] 2.16.8  
[id number]  
   
                          1999   Unknown                   [id number] 2.16.8  
[id number]  
   
                          1999   Unknown                   [id number] [id number]  
[id number]  
   
                          1999   Unknown                   [id number] 2.16.8  
[id number]  
   
                          1999   Unknown                   [id number] 2.16.  
[id number]  
   
                          1999   Unknown                   [id number] 2.16.8  
[id number]  
   
                          1999   Unknown                   [id number] 2.16.8  
[id number]  
   
                          1999   Unknown                   [id number] 2.16.84  
[id number]  
   
                          1960   Private Health Insurance              [id number]  
   
                                       Unknown                   [id number]  
  
  
  
Social History  
  
  
                          Date         Type         Detail       Facility  
   
                                       Assertion    Family problems (finding) He  
[facility]  
   
                                                [facility]       Problem situatio  
n   
relating to social and   
personal history   
(finding)                               [facility]  
   
                                                [facility]       Emotional stress  
   
(finding)                               [facility]  
   
                                                [facility] [place]       Lives with paren  
ts   
(finding)                               [facility]  
   
                                       [facility]    Social drinker (finding) He[facility]  
[facility]  
   
                                                Assertion       Benzodiazepine m  
isuse   
(finding)                               [facility]  
   
                                       [facility] [place]    Sexually active (finding) He  
[facility]   
[facility]                 [facility]   
[facility]  
   
                                                [facility] [place]       Gender identity   
finding   
(finding)                               [facility]  
   
                                                [facility]       Finding of sexua  
l   
orientation (finding)                   [facility]  
   
                                                            Tobacco smoking   
status                    Unknown if ever smoked    [facility]   
[facility]  
Work Phone:   
[phone]  
   
                                                    Start: 10-  
End: 2021                         Tobacco smoking   
status NHIS               Never smoker              [facility] Phone:   
[phone]  
   
                                                    Start: 10-  
End: 2021                         Tobacco use and   
exposure                  Never used                [facility]  
   
                                                    Start: 2021  
End: 2021     Alcohol intake      Ex-drinker (finding) [facility] Phone:   
[phone]  
   
                                        Start: 2021   History SDOH Alcohol  
   
Frequency                 1                         [facility] Phone:   
[phone]  
   
                                        Start: 1999   Sex Assigned At   
Birth                     Not on file               [facility] Phone:   
[phone]  
   
                                                            Exposure to   
SARS-CoV-2 (event)        Not sure                  [employer]       Smoking monitori  
ng status   
(finding)                               [facility]  
Work Phone:   
[phone]  
   
                                                Assertion       Cigarette smoker  
   
(finding)                               [facility]  
   
                                                [facility] [place]       Light cigarette   
smoker   
(1-9 cigs/day) (finding)                [facility]  
   
                                                [facility] [place]       Exposure to poll  
ution   
(event)                                 [facility]  
   
                                       [facility]    Smoker (finding) [facility]  
   
                                                [facility] [place]       Finding relating  
 to   
sexual activity (finding)               [facility]  
   
                                                Assertion       Homosexual behav  
ior   
(finding)                               [facility]  
   
                                                Assertion       Currently not se  
xually   
active (finding)                        [facility]  
   
                                                Assertion       Details of drug   
misuse   
behavior (observable   
entity)                                 [facility]  
   
                                                Assertion       History of disor  
danny   
(situation)                             [facility]  
   
                                                    NEGATED: Highlighted   
row                       Assertion                 Current drinker of   
alcohol (finding)                       [facility]  
   
                                                    NEGATED: Highlighted   
row                       Assertion                 Finding relating to drug   
misuse behavior (finding)               [facility]  
   
                                                    NEGATED: Highlighted   
row                 Assertion                               [facility]  
   
                                                    NEGATED: Highlighted   
row                       Assertion                 Exposure to pollution   
(event)                                 [facility]  
  
  
  
Medical Equipment  
  
  
                                Procedure Code  Equipment Code  Equipment Origin  
al   
Text                                    Equipment   
Identifier                              Dates  
   
                                                                Blood Glucose Te  
st In   
Vitro Strip               [id number]                  Start:   
2024  
End:   
2024  
   
                                                                [employer] Lanc[employer]  
   
Miscellaneous             [id number]                  Start:   
2024  
   
                                                                OneTouch Ultra I  
n   
Vitro Strip               [id number]                  Start:   
2024  
End:   
2025  
  
  
  
Mental Status  
  
  
                          Date         Assessment   Result       Facility  
   
                                2020      Cognitive function A previous haider  
icide attempt    
with hospitalization at [address] [place] [address]  
Work Phone:   
[phone]  
   
                                                Cognitive function Cognitive fun  
ctioning was   
normal Cognitive function   
finding (finding)                       [facility]  
Work Phone:   
[phone]  
  
  
  
Clinical Notes 2021 to 10-  
  
  
                                Note Date & Type Note            Facility  
  
  
  
                                        10- Evaluation note   
  
  
Includes: Assessments for all patient encounters  
  
  
  
                                                    Findings  
   
                                                    [D50.9 - Iron deficiency ane  
jennifer,   
unspecified] iron deficiency   
anemia                                  Chart Update with Doreen Cabrera CNP                              10/07/2024  
  
  
  
                                                    Last Documented On 10/09/202  
4 2:06PM ; [facility]  
  
  
  
                                        Attention-deficit hyperactivity disorder  
  Established Patient with Radhaleslie Young [facility]                               2024  
  
  
  
                                                    Last Documented On   
4 4:15PM ; [facility]  
  
  
  
                                                    Bipolar I disorder, most rec  
ent   
episode, depressed - mild                Established Patient with Radhaleslie Young [facility]                               2024  
  
  
  
                                                    Last Documented On   
4 4:15PM ; [facility]  
  
  
  
                                Nicotine dependence  Established Patient with   
Radhaleslie Young [facility] 2024  
  
  
  
                                                    Last Documented On   
4 4:15PM ; [facility]  
  
  
  
                                        Post-traumatic stress disorder  Establ  
ished Patient with Radha Young   
LSW                                     2024  
  
  
  
                                                    Last Documented On   
4 4:15PM ; [facility]  
  
  
  
                                                    [Z68.43 - Body mass index [B  
MI]   
50.0-59.9, adult] assessment of body   
mass index                              Medical Established Patient with   
Doreenpaola Mccormacken CNP                        2024  
  
  
  
                                                    Last Documented On 10/09/202  
4 2:09PM ; [facility]  
  
  
  
                                                    Bipolar I disorder, most rec  
ent   
episode, depressed - mild               Medical Established Patient with Doreen   
Deborah CNP                              2024  
  
  
  
                                                    Last Documented On 10/09/202  
4 2:09PM ; [facility]  
  
  
  
                                Caries          Medical Established Patient with  
 Doreen Deborah CNP 2024  
  
  
  
                                                    Last Documented On 10/09/202  
4 2:09PM ; [facility]  
  
  
  
                                        Encounter for Immunization Medical Estab  
lished Patient with Doreen Deborah   
CNP                                     2024  
  
  
  
                                                    Last Documented On 10/09/202  
4 2:09PM ; [facility]  
  
  
  
                                                    Type 2 diabetes mellitus wit  
hout   
complication                            Medical Established Patient with   
Doreen Deborah CNP                        2024  
  
  
  
                                                    Last Documented On 10/09/202  
4 2:09PM ; [facility]  
  
  
  
                                        Attention-deficit hyperactivity disorder  
  Established Patient with   
Leonie Short [facility]                     2024  
  
  
  
                                                    Last Documented On   
4 3:28PM ; [facility]  
  
  
  
                                                    Bipolar I disorder, most rec  
ent   
episode, depressed - mild                Established Patient with Leonie   
Short [facility]                             2024  
  
  
  
                                                    Last Documented On   
4 3:28PM ; [facility]  
  
  
  
                                        Post-traumatic stress disorder  Establ  
ished Patient with Leonie Short   
[facility]                                   2024  
  
  
  
                                                    Last Documented On   
4 3:28PM ; [facility]  
  
  
  
                                                    [E11.9 - Type 2 diabetes lobito  
litus   
without complications] type 2 diabetes   
mellitus                                Medical Established Patient with   
Doreen Deborah CNP                        2024  
  
  
  
                                                    Last Documented On   
4 7:36PM ; [facility]  
  
  
  
                                                    [F41.1 - Generalized anxiety  
   
disorder] generalized anxiety   
disorder                                Medical Established Patient with   
Doreen Deborah CNP                        2024  
  
  
  
                                                    Last Documented On   
4 7:36PM ; [facility]  
  
  
  
                                                    [I10 - Essential (primary)   
hypertension] essential hypertension    Medical Established Patient with   
Doreen Cabrera CNP                        2024  
  
  
  
                                                    Last Documented On   
4 7:36PM ; [facility]  
  
  
  
                                                    [N94.6 - Dysmenorrhea, unspe  
cified]   
dysmenorrhea                            Medical Established Patient with   
Doreen Cabrera CNP                        2024  
  
  
  
                                                    Last Documented On   
4 7:36PM ; [facility]  
  
  
  
                                                    [Z68.43 - Body mass index [B  
MI]   
50.0-59.9, adult] assessment of body   
mass index                              Medical Established Patient with   
Doreen Cabrera CNP                        2024  
  
  
  
                                                    Last Documented On   
4 7:36PM ; [facility]  
  
  
  
                                        Encounter for Immunization Medical Estab  
lished Patient with Doreen Cabrera   
CNP                                     2024  
  
  
  
                                                    Last Documented On   
4 7:36PM ; [facility]  
  
  
  
                                        Venipuncture was performed Medical Estab  
lished Patient with Doreen Cabrera   
CNP                                     2024  
  
  
  
                                                    Last Documented On   
4 7:36PM ; [facility]  
  
  
  
                                        Attention-deficit hyperactivity disorder  
  Established Patient with   
Leonie Short [facility]                     2024  
  
  
  
                                                    Last Documented On   
4 10:10AM ; [facility]  
  
  
  
                                                    Bipolar I disorder, most rec  
ent   
episode, depressed - mild                Established Patient with Leonie   
Short [facility]                             2024  
  
  
  
                                                    Last Documented On   
4 10:10AM ; [facility]  
  
  
  
                                        Post-traumatic stress disorder BH Establ  
ished Patient with Leonie Short   
[facility]                                   2024  
  
  
  
                                                    Last Documented On   
4 10:10AM ; [facility]  
  
  
  
                                        [D64.9 - Anemia, unspecified] anemia Med  
ical Established Patient with   
Doreen Cabrera CNP                        2024  
  
  
  
                                                    Last Documented On   
4 6:51PM ; [facility]  
  
  
  
                                                    [Z68.43 - Body mass index [B  
MI]   
50.0-59.9, adult] assessment of body   
mass index                              Medical Established Patient with   
Doreen Cabrera CNP                        2024  
  
  
  
                                                    Last Documented On   
4 6:51PM ; [facility]  
  
  
  
                                                    Bipolar affective disorder,   
current   
episode depressed, mild                  Established Patient with Leonie   
Short [facility]                             2024  
  
  
  
                                                    Last Documented On   
4 5:16PM ; [facility]  
  
  
  
                                        Post-traumatic stress disorder BH Establ  
ished Patient with Leonie Short   
[facility]                                   2024  
  
  
  
                                                    Last Documented On   
4 5:16PM ; [facility]  
  
  
  
                                                    Undifferentiated attention d  
eficit   
disorder                                BH Established Patient with   
Leonie Short [facility]                     2024  
  
  
  
                                                    Last Documented On   
4 5:16PM ; [facility]  
  
  
  
                                        Visit for: screening for disorder BH Est  
ablished Patient with Leonie   
Short [facility]                             2024  
  
  
  
                                                    Last Documented On   
4 5:16PM ; [facility]  
  
  
  
                                                    [Z68.43 - Body mass index [B  
MI]   
50.0-59.9, adult] assessment of body   
mass index                              Medical Established Patient with   
Doreen Cabrera CNP                        2024  
  
  
  
                                                    Last Documented On   
4 7:50PM ; [facility]  
  
  
  
                                        Attention-deficit hyperactivity disorder  
 Medical Established Patient with   
Doreen Cabrera CNP                        2024  
  
  
  
                                                    Last Documented On   
4 7:50PM ; [facility]  
  
  
  
                                                    Diabetes Risk Test Score was  
 three   
score 3/27/2024                         Medical Established Patient with Doreen Cabrera CNP                              2024  
  
  
  
                                                    Last Documented On   
4 7:50PM ; [facility]  
  
  
  
                                        Visit for: screening for STD Medical Est  
ablished Patient with Doreen Cabrera   
CNP                                     2024  
  
  
  
                                                    Last Documented On   
4 7:50PM ; [facility]  
  
  
  
                                                    [Z68.32 - Body mass index [B  
MI]   
32.0-32.9, adult] assessment of body   
mass index                              Medical Established Patient with   
Doreen Cabrera CNP                        2023  
  
  
  
                                                    Last Documented On   
3 6:01PM ; [facility]  
  
  
  
                                        Borderline personality disorder Medical   
Established Patient with Doreenpaola Mccormacken CNP                              2023  
  
  
  
                                                    Last Documented On   
3 6:01PM ; [facility]  
  
  
  
                                        Screening for diabetes mellitus Medical   
Established Patient with Doreenpaola Mccormacken CNP                              2023  
  
  
  
                                                    Last Documented On   
3 6:01PM ; [facility]  
  
  
  
                                        Assessment of body mass index Medical Es  
tablished Patient with Doreen Cabrera CNP                              10/21/2022  
  
  
  
                                                    Last Documented On 10/21/202  
2 2:45PM ; [facility]  
  
  
  
                                                    Bipolar affective disorder,   
current   
episode manic                            [facility] with Haylienicanor Aguilar [facility]                        2022  
  
  
  
                                                    Last Documented On   
2 3:22PM ; [facility]  
  
  
  
                                                    Bipolar affective disorder,   
current   
episode manic                            Established Patient with Haylie   
Aguilar [facility]                        2022  
  
  
  
                                                    Last Documented On   
2 2:28PM ; [facility]  
  
  
  
                                                    Bipolar affective disorder,   
current   
episode depressed, severe with   
psychosis                                [facility] with Haylienicanor Aguilar [facility]                        2022  
  
  
  
                                                    Last Documented On   
2 3:29PM ; [facility]  
  
  
  
                                                    Assessment of body mass inde  
x [Body   
mass index [BMI] 50.0-59.9, adult]      Open Access - Established with Julieth   
Aliya CNP                               2022  
  
  
  
                                                    Last Documented On   
2 9:36AM ; [facility]  
  
  
  
                                                    Bipolar I disorder, most rec  
ent   
episode, manic                          Open Access - Established with Julieth   
Aliya CNP                               2022  
  
  
  
                                                    Last Documented On   
2 9:36AM ; [facility]  
  
  
  
                                        Post-traumatic stress disorder  Establ  
ished Patient with Haylienicanor Aguilar   
LP[facility]                                  2022  
  
  
  
                                                    Last Documented On   
2 3:20PM ; [facility]  
  
  
  
                                No cough        Medical Established Patient with  
 Doreen Deborah CNP 2022  
  
  
  
                                                    Last Documented On   
2 4:04PM ; [facility]  
  
  
  
                                                    Z68.43 - Body mass index [BM  
I]   
50.0-59.9, adult                        Medical Established Patient with   
Doreen Deborah CNP                        2022  
  
  
  
                                                    Last Documented On   
2 4:04PM ; [facility]  
  
  
  
                                                    Borderline personality disor  
danny Pt   
reported hx of sx/dx                     Established Patient with Haylienicanor Martinerson [facility]                        2022  
  
  
  
                                                    Last Documented On   
2 4:08PM ; [facility]  
  
  
  
                                                    Assessment of body mass inde  
x [Body   
mass index [BMI] 50.0-59.9, adult]      Open Access - Established with Julieth   
Aliya CNP                               2022  
  
  
  
                                                    Last Documented On   
2 7:41PM ; [facility]  
  
  
  
                                                    Diabetes Risk Test Score was  
 three   
score 2022                         Open Access - Established with Julieth   
Aliya CNP                               2022  
  
  
  
                                                    Last Documented On   
2 7:41PM ; [facility]  
  
  
  
                                                    Bipolar I disorder, most rec  
ent   
episode, manic                           Established Patient with Avis   
Felder [facility]                          2021  
  
  
  
                                                    Last Documented On   
1 1:35AM ; [facility]  
  
  
  
                                        Borderline personality disorder  Estab  
lished Patient with Avis   
Felder [facility]                          2021  
  
  
  
                                                    Last Documented On   
1 1:35AM ; [facility]  
  
  
  
                                        Post-traumatic stress disorder  Establ  
ished Patient with Avis   
Felder [facility]                          2021  
  
  
  
                                                    Last Documented On   
1 1:35AM ; [facility]  
  
  
  
                                                    Assessment of visit for: bhargavi guevaraning for   
human immunodeficiency virus            Medical Established Patient with   
Doreen Deborah CNP                        2021  
  
  
  
                                                    Last Documented On   
1 2:56PM ; [facility]  
  
  
  
                                Nicotine dependence Medical Established Patient   
with Doreen Deborah CNP 2021  
  
  
  
                                                    Last Documented On   
1 2:56PM ; [facility]     Medical Established Patient with  
 Doreen Deborah CNP 2021  
  
  
  
                                                    Last Documented On   
1 2:56PM ; [facility]  
  
  
  
                                                    Z68.43 - Body mass index [BM  
I]   
50.0-59.9, adult                        Medical Established Patient with   
Doreen Deborah CNP                        2021  
  
  
  
                                                    Last Documented On   
1 2:56PM ; [facility]  
  
  
  
                                                    Bipolar I disorder, most rec  
ent   
episode, manic                           Established Patient with Leonie   
Short [facility]                             2021  
  
  
  
                                                    Last Documented On   
1 10:17AM ; [facility]  
  
  
  
                                                    Borderline personality disor  
danny per   
patient reported history                BH Established Patient with Leonie   
Short [facility]                             2021  
  
  
  
                                                    Last Documented On   
1 10:17AM ; [facility]  
  
  
  
                                Nicotine dependence  Established Patient with   
Leonie Short [facility] 2021  
  
  
  
                                                    Last Documented On   
1 10:17AM ; [facility]  
  
  
  
                                        Post-traumatic stress disorder  Establ  
ished Patient with Leonie Short   
[facility]                                   2021  
  
  
  
                                                    Last Documented On   
1 10:17AM ; [facility]  
  
  
  
                                Body mass index Medical Established Patient with  
 Doreenpaola Mccormacken CNP 2021  
  
  
  
                                                    Last Documented On   
1 5:23PM ; [facility]  
  
  
  
                                Morbid obesity  Medical Established Patient with  
 Doreen Deborah CNP 2021  
  
  
  
                                                    Last Documented On   
1 5:23PM ; [facility]  
  
  
  
                                        Nicotine dependence uncomplicated Medica  
l Established Patient with Doreen   
Deborah CNP                              2021  
  
  
  
                                                    Last Documented On   
1 5:23PM ; [facility]  
  
  
  
                                                    Z68.42 - Body mass index [BM  
I]   
45.0-49.9, adult                        Medical Established Patient with   
Doreen Deborah CNP                        2021  
  
  
  
                                                    Last Documented On   
1 5:23PM ; [facility]  
  
  
  
                                Episodic mood disorders On Call with Pamela edwards CNP 2021  
  
  
  
                                                    Last Documented On   
1 7:49AM ; [facility]  
  
  
  
                                                    Mood disorders, NOS per tabatha  
ent   
reported history                         Established Patient with Leonie   
Short [facility]                             2021  
  
  
  
                                                    Last Documented On   
1 7:15PM ; [facility]  
  
  
  
                                        Post-traumatic stress disorder  Establ  
ished Patient with Leonie Short   
[facility]                                   2021  
  
  
  
                                                    Last Documented On   
1 7:15PM ; [facility]  
  
  
  
                                Morbid obesity  Medical Established Patient with  
 Doreen Deborah CNP 2021  
  
  
  
                                                    Last Documented On   
1 12:31PM ; [facility]  
  
  
  
                                Otitis externa  Medical Established Patient with  
 Doreen Deborah CNP 2021  
  
  
  
                                                    Last Documented On   
1 12:31PM ; [facility]  
  
  
  
                                                    Z68.42 - Body mass index [BM  
I]   
45.0-49.9, adult                        Medical Established Patient with   
Doreen Deborah CNP                        2021  
  
  
  
                                                    Last Documented On   
1 12:31PM ; [facility]  
  
  
  
                                        Post-traumatic stress disorder  Establ  
ished Patient with Leonie Short   
[facility]                                   06/10/2021  
  
  
  
                                                    Last Documented On 06/10/202  
1 10:05PM ; [facility]  
  
  
  
                                Morbid obesity  Medical Established Patient with  
 Doreen Cabrera CNP 06/10/2021  
  
  
  
                                                    Last Documented On 06/10/202  
1 4:45PM ; [facility]  
  
  
  
                                                    Z68.42 - Body mass index [BM  
I]   
45.0-49.9, adult                        Medical Established Patient with   
Doreenpaola Cabrera CNP                        06/10/2021  
  
  
  
                                                    Last Documented On 06/10/202  
1 4:45PM ; [facility]  
  
  
  
                                        Post-traumatic stress disorder  Establ  
ished Patient with Leonie Short   
[facility]                                   2021  
  
  
  
                                                    Last Documented On   
1 11:59AM ; [facility]  
  
  
  
                                                    Assessment of visit for: bhargavi myles for   
human immunodeficiency virus            Medical New Patient with Doreen Cabrera CNP                              2021  
  
  
  
                                                    Last Documented On   
1 4:06PM ; [facility]  
  
  
  
                                                    Diabetes Risk Test Score was  
 one score   
2021                               Medical New Patient with Doreen Cabrera CNP                              2021  
  
  
  
                                                    Last Documented On   
1 4:06PM ; [facility]  
  
  
  
                                Hypertension    Medical New Patient with Doreen DAVID esposito CNP 2021  
  
  
  
                                                    Last Documented On   
1 4:06PM ; [facility]  
  
  
  
                                Morbid obesity  Medical New Patient with Doreen C  
cate CNP 2021  
  
  
  
                                                    Last Documented On   
1 4:06PM ; [facility]  
  
  
  
                                Post-traumatic stress disorder Medical New Patie  
nt with Doreenpaola Cabrera CNP   
2021  
  
  
  
                                                    Last Documented On   
1 4:06PM ; [facility]  
  
  
  
                                                    Z68.42 - Body mass index [BM  
I]   
45.0-49.9, adult                        Medical New Patient with Doreenpaola Cabrera CNP                              2021  
  
  
  
                                                    Last Documented On   
1 4:06PM ; [facility]  
Work Phone: [phone]- Evaluation note  
  
Includes: Assessments for all patient encounters  
  
  
  
                                Findings        Encounter       Date  
   
                                                    [D50.9 - Iron deficiency ane  
jennifer,   
unspecified] iron deficiency anemia Chart Update with Doreen Cabrera CNP   
10/07/2024  
  
  
  
                                                    Last Documented On 10/09/202  
4 2:06PM ; [facility]  
  
  
  
                                        Attention-deficit hyperactivity disorder  
 BH Established Patient with Radha Young LSW                               2024  
  
  
  
                                                    Last Documented On   
4 4:15PM ; [facility]  
  
  
  
                                                    Bipolar I disorder, most rec  
ent   
episode, depressed - mild               BH Established Patient with Radha   
Young LSW                               2024  
  
  
  
                                                    Last Documented On   
4 4:15PM ; [facility]  
  
  
  
                                Nicotine dependence  Established Patient with   
Radhaleslie Young LSW 2024  
  
  
  
                                                    Last Documented On   
4 4:15PM ; [facility]  
  
  
  
                                        Post-traumatic stress disorder  Establ  
ished Patient with Radha Young   
LSW                                     2024  
  
  
  
                                                    Last Documented On   
4 4:15PM ; [facility]  
  
  
  
                                                    [Z68.43 - Body mass index [B  
MI]   
50.0-59.9, adult] assessment of body   
mass index                              Medical Established Patient with   
Doreen Cabrera CNP                        2024  
  
  
  
                                                    Last Documented On 10/04/202  
4 1:56PM ; [facility]  
  
  
  
                                                    Bipolar I disorder, most rec  
ent   
episode, depressed - mild               Medical Established Patient with Doreen Cabrera CNP                              2024  
  
  
  
                                                    Last Documented On 10/04/202  
4 1:56PM ; [facility]  
  
  
  
                                Caries          Medical Established Patient with  
 Doreen Deborah CNP 2024  
  
  
  
                                                    Last Documented On 10/04/202  
4 1:56PM ; [facility]  
  
  
  
                                        Encounter for Immunization Medical Estab  
lished Patient with Doreen Cabrera   
CNP                                     2024  
  
  
  
                                                    Last Documented On 10/04/202  
4 1:56PM ; [facility]  
  
  
  
                                                    Type 2 diabetes mellitus wit  
hout   
complication                            Medical Established Patient with   
Doreen Mccormacken CNP                        2024  
  
  
  
                                                    Last Documented On 10/04/202  
4 1:56PM ; [facility]  
  
  
  
                                        Attention-deficit hyperactivity disorder  
  Established Patient with   
Leonie Short LISWS                     2024  
  
  
  
                                                    Last Documented On   
4 3:28PM ; [facility]  
  
  
  
                                                    Bipolar I disorder, most rec  
ent   
episode, depressed - mild               BH Established Patient with Leonie   
Short LISWS                             2024  
  
  
  
                                                    Last Documented On   
4 3:28PM ; [facility]  
  
  
  
                                        Post-traumatic stress disorder  Establ  
ished Patient with Leonie Short   
LISWS                                   2024  
  
  
  
                                                    Last Documented On   
4 3:28PM ; [facility]  
  
  
  
                                                    [E11.9 - Type 2 diabetes lobito  
litus   
without complications] type 2 diabetes   
mellitus                                Medical Established Patient with   
Doreen Cabrera CNP                        2024  
  
  
  
                                                    Last Documented On   
4 7:36PM ; [facility]  
  
  
  
                                                    [F41.1 - Generalized anxiety  
   
disorder] generalized anxiety   
disorder                                Medical Established Patient with   
Doreen Cabrera CNP                        2024  
  
  
  
                                                    Last Documented On   
4 7:36PM ; [facility]  
  
  
  
                                                    [I10 - Essential (primary)   
hypertension] essential hypertension    Medical Established Patient with   
Doreen Cabrera CNP                        2024  
  
  
  
                                                    Last Documented On   
4 7:36PM ; [facility]  
  
  
  
                                                    [N94.6 - Dysmenorrhea, unspe  
cified]   
dysmenorrhea                            Medical Established Patient with   
Doreen Cabrera CNP                        2024  
  
  
  
                                                    Last Documented On   
4 7:36PM ; [facility]  
  
  
  
                                                    [Z68.43 - Body mass index [B  
MI]   
50.0-59.9, adult] assessment of body   
mass index                              Medical Established Patient with   
Doreen Cabrera CNP                        2024  
  
  
  
                                                    Last Documented On   
4 7:36PM ; [facility]  
  
  
  
                                        Encounter for Immunization Medical Estab  
lished Patient with Doreen Cabrera   
CNP                                     2024  
  
  
  
                                                    Last Documented On   
4 7:36PM ; [facility]  
  
  
  
                                        Venipuncture was performed Medical Estab  
lished Patient with Doreen Cabrera   
CNP                                     2024  
  
  
  
                                                    Last Documented On   
4 7:36PM ; [facility]  
  
  
  
                                        Attention-deficit hyperactivity disorder  
  Established Patient with   
Leonie Short LISWS                     2024  
  
  
  
                                                    Last Documented On   
4 10:10AM ; [facility]  
  
  
  
                                                    Bipolar I disorder, most rec  
ent   
episode, depressed - mild                Established Patient with Leonie   
Short LISWS                             2024  
  
  
  
                                                    Last Documented On   
4 10:10AM ; [facility]  
  
  
  
                                        Post-traumatic stress disorder  Establ  
ished Patient with Leonie Short   
LISWS                                   2024  
  
  
  
                                                    Last Documented On   
4 10:10AM ; [facility]  
  
  
  
                                        [D64.9 - Anemia, unspecified] anemia Med  
ical Established Patient with   
Doreen Cabrera CNP                        2024  
  
  
  
                                                    Last Documented On   
4 6:51PM ; [facility]  
  
  
  
                                                    [Z68.43 - Body mass index [B  
MI]   
50.0-59.9, adult] assessment of body   
mass index                              Medical Established Patient with   
Doreen Cabrera CNP                        2024  
  
  
  
                                                    Last Documented On   
4 6:51PM ; [facility]  
  
  
  
                                                    Bipolar affective disorder,   
current   
episode depressed, mild                  Established Patient with Leonie   
Short LISWS                             2024  
  
  
  
                                                    Last Documented On   
4 5:16PM ; [facility]  
  
  
  
                                        Post-traumatic stress disorder  Establ  
ished Patient with Leonie Short   
LISWS                                   2024  
  
  
  
                                                    Last Documented On   
4 5:16PM ; [facility]  
  
  
  
                                                    Undifferentiated attention d  
eficit   
disorder                                 Established Patient with   
Leonie Short LISWS                     2024  
  
  
  
                                                    Last Documented On   
4 5:16PM ; [facility]  
  
  
  
                                        Visit for: screening for disorder BH Est  
ablished Patient with Leonie Garrett LISWS                             2024  
  
  
  
                                                    Last Documented On   
4 5:16PM ; [facility]  
  
  
  
                                                    [Z68.43 - Body mass index [B  
MI]   
50.0-59.9, adult] assessment of body   
mass index                              Medical Established Patient with   
Doreen Cabrera CNP                        2024  
  
  
  
                                                    Last Documented On   
4 7:50PM ; [facility]  
  
  
  
                                        Attention-deficit hyperactivity disorder  
 Medical Established Patient with   
Doreen Cabrera CNP                        2024  
  
  
  
                                                    Last Documented On   
4 7:50PM ; [facility]  
  
  
  
                                                    Diabetes Risk Test Score was  
 three   
score 3/27/2024                         Medical Established Patient with Doreen Cabrera CNP                              2024  
  
  
  
                                                    Last Documented On   
4 7:50PM ; [facility]  
  
  
  
                                        Visit for: screening for STD Medical Est  
ablished Patient with Doreen Cabrera   
CNP                                     2024  
  
  
  
                                                    Last Documented On   
4 7:50PM ; [facility]  
  
  
  
                                                    [Z68.32 - Body mass index [B  
MI]   
32.0-32.9, adult] assessment of body   
mass index                              Medical Established Patient with   
Doreen Cabrera CNP                        2023  
  
  
  
                                                    Last Documented On   
3 6:01PM ; [facility]  
  
  
  
                                        Borderline personality disorder Medical   
Established Patient with Doreenpaola Cabrera CNP                              2023  
  
  
  
                                                    Last Documented On   
3 6:01PM ; [facility]  
  
  
  
                                        Screening for diabetes mellitus Medical   
Established Patient with Doreenpaola Cabrera CNP                              2023  
  
  
  
                                                    Last Documented On   
3 6:01PM ; [facility]  
  
  
  
                                        Assessment of body mass index Medical Es  
tablished Patient with Doreenpaola Cabrera CNP                              10/21/2022  
  
  
  
                                                    Last Documented On 10/21/202  
2 2:45PM ; [facility]  
  
  
  
                                                    Bipolar affective disorder,   
current   
episode manic                            [facility] with Haylie   
Aguilar [facility]                        2022  
  
  
  
                                                    Last Documented On   
2 3:22PM ; [facility]  
  
  
  
                                                    Bipolar affective disorder,   
current   
episode manic                            Established Patient with Haylie   
Aguilar LPCC-S                        2022  
  
  
  
                                                    Last Documented On   
2 2:28PM ; [facility]  
  
  
  
                                                    Bipolar affective disorder,   
current   
episode depressed, severe with   
psychosis                                [facility] with Haylie   
Aguilar [facility]                        2022  
  
  
  
                                                    Last Documented On   
2 3:29PM ; [facility]  
  
  
  
                                                    Assessment of body mass inde  
x [Body   
mass index [BMI] 50.0-59.9, adult]      Open Access - Established with Julieth   
Aliya CNP                               2022  
  
  
  
                                                    Last Documented On   
2 9:36AM ; [facility]  
  
  
  
                                                    Bipolar I disorder, most rec  
ent   
episode, manic                          Open Access - Established with Julieth   
Aliya CNP                               2022  
  
  
  
                                                    Last Documented On   
2 9:36AM ; [facility]  
  
  
  
                                        Post-traumatic stress disorder BH Establ  
ished Patient with Haylie Aguilar   
LP[facility]-S                                  2022  
  
  
  
                                                    Last Documented On   
2 3:20PM ; [facility]  
  
  
  
                                No cough        Medical Established Patient with  
 Doreenpaola Cabrera CNP 2022  
  
  
  
                                                    Last Documented On   
2 4:04PM ; [facility]  
  
  
  
                                                    Z68.43 - Body mass index [BM  
I]   
50.0-59.9, adult                        Medical Established Patient with   
Doreen Deborah CNP                        2022  
  
  
  
                                                    Last Documented On   
2 4:04PM ; [facility]  
  
  
  
                                                    Borderline personality disor  
danny Pt   
reported hx of sx/dx                     Established Patient with Haylie Aguilar LPCC-S                        2022  
  
  
  
                                                    Last Documented On   
2 4:08PM ; [facility]  
  
  
  
                                                    Assessment of body mass inde  
x [Body   
mass index [BMI] 50.0-59.9, adult]      Open Access - Established with Julieth   
Aliya CNP                               2022  
  
  
  
                                                    Last Documented On   
2 7:41PM ; [facility]  
  
  
  
                                                    Diabetes Risk Test Score was  
 three   
score 2022                         Open Access - Established with Julieth   
Aliya CNP                               2022  
  
  
  
                                                    Last Documented On   
2 7:41PM ; [facility]  
  
  
  
                                                    Bipolar I disorder, most rec  
ent   
episode, manic                           [facility]                          [facility]  
  
  
  
                                                    Last Documented On   
1 1:35AM ; [facility]  
  
  
  
                                        Borderline personality disorder  Estab  
lished Patient [facility]   
[facility]                          20[facility]  
  
  
  
                                                    Last Documented On   
1 1:35AM ; [facility]  
  
  
  
                                        Post-traumatic stress disorder  Establ  
ished Patient [facility]   
[facility]                          2021  
  
  
  
                                                    Last Documented On   
1 1:35AM ; [facility]  
  
  
  
                                                    Assessment of visit for: scr  
eening for   
human immunodeficiency virus            Medical Established Patient with   
Doreen Deborah CNP                        2021  
  
  
  
                                                    Last Documented On   
1 2:56PM ; [facility]  
  
  
  
                                Nicotine dependence Medical Established Patient   
with Doreen Deborah CNP 2021  
  
  
  
                                                    Last Documented On   
1 2:56PM ; [facility]  
  
  
  
                                Tachycardia     Medical Established Patient with  
 Doreen Deborah CNP 2021  
  
  
  
                                                    Last Documented On   
1 2:56PM ; [facility]  
  
  
  
                                                    Z68.43 - Body mass index [BM  
I]   
50.0-59.9, adult                        Medical Established Patient with   
Doreen Deborah [facility]                        2021  
  
  
  
                                                    Last Documented On   
1 2:56PM ; [facility]  
  
  
  
                                                    Bipolar I disorder, most rec  
ent   
episode, manic                           Established Patient with Leonie   
Short LISWS                             2021  
  
  
  
                                                    Last Documented On   
1 10:17AM ; [facility]  
  
  
  
                                                    Borderline personality disor  
danny per   
patient reported history                 Established Patient with Leonie   
Short LISWS                             2021  
  
  
  
                                                    Last Documented On   
1 10:17AM ; [facility]  
  
  
  
                                Nicotine dependence BH Established Patient with   
Leonie Short LISWS 2021  
  
  
  
                                                    Last Documented On   
1 10:17AM ; [facility]  
  
  
  
                                        Post-traumatic stress disorder  Establ  
ished Patient with Leonie Short   
LISWS                                   2021  
  
  
  
                                                    Last Documented On   
1 10:17AM ; [facility]  
  
  
  
                                Body mass index Medical Established Patient with  
 Doreen Deborah CNP 2021  
  
  
  
                                                    Last Documented On   
1 5:23PM ; [facility]  
  
  
  
                                Morbid obesity  Medical Established Patient with  
 Doreen Deborah CNP 2021  
  
  
  
                                                    Last Documented On   
1 5:23PM ; [facility]  
  
  
  
                                        Nicotine dependence uncomplicated Medica  
l Established Patient with Doreen   
Deborah CNP                              2021  
  
  
  
                                                    Last Documented On   
1 5:23PM ; [facility]  
  
  
  
                                                    Z68.42 - Body mass index [BM  
I]   
45.0-49.9, adult                        Medical Established Patient with   
Doreen Deborah CNP                        2021  
  
  
  
                                                    Last Documented On   
1 5:23PM ; [facility]  
  
  
  
                                Episodic mood disorders On Call with Pamela edwards CNP 2021  
  
  
  
                                                    Last Documented On   
1 7:49AM ; [facility]  
  
  
  
                                                    Mood disorders, NOS per tabatha  
ent   
reported history                         Established Patient with Leonie   
Short LISWS                             2021  
  
  
  
                                                    Last Documented On   
1 7:15PM ; [facility]  
  
  
  
                                        Post-traumatic stress disorder  Establ  
ished Patient with Leonie Short   
LISWS                                   2021  
  
  
  
                                                    Last Documented On   
1 7:15PM ; [facility]  
  
  
  
                                Morbid obesity  Medical Established Patient with  
 Doreen Deborah CNP 2021  
  
  
  
                                                    Last Documented On   
1 12:31PM ; [facility]  
  
  
  
                                Otitis externa  Medical Established Patient with  
 Doreen Deborah CNP 2021  
  
  
  
                                                    Last Documented On   
1 12:31PM ; [facility]  
  
  
  
                                                    Z68.42 - Body mass index [BM  
I]   
45.0-49.9, adult                        Medical Established Patient with   
Doreen Deborah CNP                        2021  
  
  
  
                                                    Last Documented On   
1 12:31PM ; [facility]  
  
  
  
                                        Post-traumatic stress disorder  Establ  
ished Patient with Leonie Short   
LISWS                                   06/10/2021  
  
  
  
                                                    Last Documented On 06/10/202  
1 10:05PM ; [facility]  
  
  
  
                                Morbid obesity  Medical Established Patient with  
 Doreen Deborah CNP 06/10/2021  
  
  
  
                                                    Last Documented On 06/10/202  
1 4:45PM ; [facility]  
  
  
  
                                                    Z68.42 - Body mass index [BM  
I]   
45.0-49.9, adult                        Medical Established Patient with   
Doreen Deborah CNP                        06/10/2021  
  
  
  
                                                    Last Documented On 06/10/202  
1 4:45PM ; [facility]  
  
  
  
                                        Post-traumatic stress disorder  Establ  
ished Patient with Leonie Short   
LISWS                                   2021  
  
  
  
                                                    Last Documented On   
1 11:59AM ; [facility]  
  
  
  
                                                    Assessment of visit for: scr  
eening for   
human immunodeficiency virus            Medical New Patient with Doreen   
Deborah CNP                              2021  
  
  
  
                                                    Last Documented On   
1 4:06PM ; [facility]  
  
  
  
                                                    Diabetes Risk Test Score was  
 one score   
2021                               Medical New Patient with Doreen   
Deborah CNP                              2021  
  
  
  
                                                    Last Documented On   
1 4:06PM ; [facility]  
  
  
  
                                Hypertension    Medical New Patient with Doreen C  
cate CNP 2021  
  
  
  
                                                    Last Documented On   
1 4:06PM ; [facility]  
  
  
  
                                Morbid obesity  Medical New Patient with Doreen C  
cate CNP 2021  
  
  
  
                                                    Last Documented On   
1 4:06PM ; [facility]  
  
  
  
                                Post-traumatic stress disorder Medical New Patie  
nt with Doreen Deborah CNP   
2021  
  
  
  
                                                    Last Documented On   
1 4:06PM ; [facility]  
  
  
  
                                                    Z68.42 - Body mass index [BM  
I]   
45.0-49.9, adult                        Medical New Patient with Doreen   
Deborah CNP                              2021  
  
  
  
                                                    Last Documented On   
1 4:06PM ; [facility]  
Work Phone: [phone]- Progress note*   
  
Progress note  
  
  
  
                                Date            Encounter       Last Documented   
by  
   
                                10/07/2024      Chart Update    Last documented   
on 10/09/2024; 2:06 PM, Doreen Cabrera CNP;   
[facility]  
  
  
  
                                                      
  
  
** Active Problems & Conditions **  
- F90.9 - Attention-deficit Hyperactivity Disorder  
- F31.31 - Bipolar I Disorder, Most Recent Episode, Depressed Mild  
- E11.9 - Diabetes Mellitus Type 2 Without Complication  
- N94.6 - Dysmenorrhea  
- F43.10 - Post-traumatic Stress Disorder  
  
** Current Medication **  
- Alcohol Pads 70% use to cleanse skin prior to checking blood sugars, 90 days, 
3   
refills  
- ARIPiprazole 5 MG Oral Tablet take 1 tablet by mouth once daily, 30 days, 5 
refills  
- Atorvastatin Calcium 20 MG Oral Tablet take 1 tablet by mouth once daily at   
bedtime, 30 days, 5 refills  
- Blood Glucose System Sriram Kit use to check sugars once daily (use what is 
covered),   
30 days, 0 refills  
- busPIRone HCl 10 MG Oral Tablet take 1 tablet by mouth twice daily (d/c 5 mg 
rx),   
30 days, 5 refills  
- CareSens Lancets Miscellaneous use to check sugars once daily (dispense what 
is   
covered), 90 days, 3 refills  
- Colace 100 MG Oral Capsule take 1 capsule by mouth once daily at bedtime as 
needed   
for constipation, 30 days, 5 refills  
- [employer] Iron 325 (65 Fe) MG Oral Tablet take 1 tablet by mouth once daily, 30 
days, 5   
refills  
- Intuniv 1 MG Oral Tablet Extended Release 24 Hour take 1 tablet by mouth once 
daily   
at bedtime, 30 days, 5 refills  
- Januvia 50 MG Oral Tablet take 1 tablet by mouth once daily, 30 days, 5 
refills  
- lamoTRIgine 100 MG Oral Tablet take 1 tablet by mouth once daily, 30 days, 5   
refills  
- Lisinopril 5 MG Oral Tablet take 1 tablet by mouth once daily, 30 days, 5 
refills  
- MiraLax 17 GM/SCOOP Oral Powder mix 1 scoop with 8 ounces once daily, 30 days,
 2   
refills  
- Narcan 4 MG/0.1ML Nasal Liquid spray in nostril for symptoms of overdose , may
   
repeat in 2 minutes if symptoms persist, 1 days, 0 refills  
- OneTouch Ultra In Vitro Strip USE ONE TEST STRIP TO CHECK BLOOD SUGARS ONCE 
DAILY,   
90 days, 3 refills  
- Prazosin HCl 1 MG Oral Capsule take 1 capsule by mouth twice daily, 30 days, 5
   
refills  
- SEROquel 50 MG Oral Tablet take 1 tablet by mouth once daily at bedtime (d/c   
trazodone), 30 days, 1 refills  
- Sertraline HCl 50 MG Oral Tablet take 1 tablet by mouth once daily, 30 days, 5
   
refills  
- Slynd 4 MG Oral Tablet take 1 tablet by mouth at the same time everyday to 
help   
regulate your period, 28 days, 2 refills  
  
** Past Medical/Surgical History **  
Reported:  
No Safety Measures. Has sex without a condom.  
Medical: No previous hospitalizations. Chronic illness and Sexually transmitted   
infection Partners sexually transmitted infection status known.  
Immunization History: Recent immunization for flu.  
Exposure: Exposure to COVID-19.  
Pregnancy: Previously pregnant 1 time(s) and para having 0 live birth(s). Not   
planning a pregnancy in the next year.  
Legal Documents: Consent form on file for procedure.  
Diagnoses:  
Polycystic Ovarian Syndrome (PCOS).  
Migraine headache.  
Psychiatric disorders biopolar disorder  
Anxiety disorder  
POTS.  
Procedural:  
- Insertion of ear pressure equalization tubes in both ears  
Surgical:  
- Tonsillectomy  
- Tonsillectomy with adenoidectomy  
  
** Allergies **  
- Abilify Reaction: groggy  
- Augmentin Reaction: Shock  
- Metformin Reaction: Nausea, Vomiting  
- NO KNOWN ENVIRONMENTAL ALLERGIES  
- NO KNOWN FOOD ALLERGIES  
- Sertraline Reaction: slow/groggy  
- Trazodone Hydrochloride Reaction: Panic attack  
  
** Family History **  
Paternal:  
Systemic hypertension  
Oncologic disorder  
Maternal:  
Systemic hypertension  
Epilepsy and recurrent seizures  
Psychiatric disorders  
Oncologic disorder  
Fraternal:  
Psychiatric disorders  
  
** Assessment **  
- D50.9 - Iron deficiency anemia, unspecified  
  
** Plan **  
StartCited- Iron deficiency anemia, unspecified  
Lab: Iron and TIBC  
Lab: CBC With Differential/Platelet  
EndCited  
** Care Team **  
- Doreen Cabrera CNP  
  
  
[facility]- Progress note*   
  
Progress note  
  
  
  
                                Date            Encounter       Last Documented   
by  
   
                                10/01/2024      Chart Update    Last documented   
on 10/07/2024; 12:06 PM, Doreen Cabrera CNP;   
[facility]  
  
  
  
                                                      
  
  
** Active Problems & Conditions **  
- F90.9 - Attention-deficit Hyperactivity Disorder  
- F31.31 - Bipolar I Disorder, Most Recent Episode, Depressed Mild  
- E11.9 - Diabetes Mellitus Type 2 Without Complication  
- N94.6 - Dysmenorrhea  
- F43.10 - Post-traumatic Stress Disorder  
  
** Current Medication **  
- Alcohol Pads 70% use to cleanse skin prior to checking blood sugars, 90 days, 
3   
refills  
- ARIPiprazole 5 MG Oral Tablet take 1 tablet by mouth once daily, 30 days, 5 
refills  
- Atorvastatin Calcium 20 MG Oral Tablet take 1 tablet by mouth once daily at   
bedtime, 30 days, 5 refills  
- Blood Glucose System Sriram Kit use to check sugars once daily (use what is 
covered),   
30 days, 0 refills  
- busPIRone HCl 10 MG Oral Tablet take 1 tablet by mouth twice daily (d/c 5 mg 
rx),   
30 days, 5 refills  
- CareSens Lancets Miscellaneous use to check sugars once daily (dispense what 
is   
covered), 90 days, 3 refills  
- Colace 100 MG Oral Capsule take 1 capsule by mouth once daily at bedtime as 
needed   
for constipation, 30 days, 5 refills  
- [employer]S Iron 325 (65 Fe) MG Oral Tablet take 1 tablet by mouth once daily, 30 
days, 5   
refills  
- Intuniv 1 MG Oral Tablet Extended Release 24 Hour take 1 tablet by mouth once 
daily   
at bedtime, 30 days, 5 refills  
- Januvia 50 MG Oral Tablet take 1 tablet by mouth once daily, 30 days, 5 
refills  
- lamoTRIgine 100 MG Oral Tablet take 1 tablet by mouth once daily, 30 days, 5   
refills  
- Lisinopril 5 MG Oral Tablet take 1 tablet by mouth once daily, 30 days, 5 
refills  
- MiraLax 17 GM/SCOOP Oral Powder mix 1 scoop with 8 ounces once daily, 30 days,
 2   
refills  
- Narcan 4 MG/0.1ML Nasal Liquid spray in nostril for symptoms of overdose , may
   
repeat in 2 minutes if symptoms persist, 1 days, 0 refills  
- OneTouch Ultra In Vitro Strip USE ONE TEST STRIP TO CHECK BLOOD SUGARS ONCE 
DAILY,   
90 days, 3 refills  
- Prazosin HCl 1 MG Oral Capsule take 1 capsule by mouth twice daily, 30 days, 5
   
refills  
- SEROquel 50 MG Oral Tablet take 1 tablet by mouth once daily at bedtime (d/c   
trazodone), 30 days, 1 refills  
- Sertraline HCl 50 MG Oral Tablet take 1 tablet by mouth once daily, 30 days, 5
   
refills  
- Slynd 4 MG Oral Tablet take 1 tablet by mouth at the same time everyday to 
help   
regulate your period, 28 days, 2 refills  
  
** Past Medical/Surgical History **  
Reported:  
No Safety Measures. Has sex without a condom.  
Medical: No previous hospitalizations. Chronic illness and Sexually transmitted   
infection Partners sexually transmitted infection status known.  
Immunization History: Recent immunization for flu.  
Exposure: Exposure to COVID-19.  
Pregnancy: Previously pregnant 1 time(s) and para having 0 live birth(s). Not   
planning a pregnancy in the next year.  
Legal Documents: Consent form on file for procedure.  
Diagnoses:  
Polycystic Ovarian Syndrome (PCOS).  
Migraine headache.  
Psychiatric disorders biopolar disorder  
Anxiety disorder  
POTS.  
Procedural:  
- Insertion of ear pressure equalization tubes in both ears  
Surgical:  
- Tonsillectomy  
- Tonsillectomy with adenoidectomy  
  
** Allergies **  
- Abilify Reaction: groggy  
- Augmentin Reaction: Shock  
- Metformin Reaction: Nausea, Vomiting  
- NO KNOWN ENVIRONMENTAL ALLERGIES  
- NO KNOWN FOOD ALLERGIES  
- Sertraline Reaction: slow/groggy  
- Trazodone Hydrochloride Reaction: Panic attack  
  
** Family History **  
Paternal:  
Systemic hypertension  
Oncologic disorder  
Maternal:  
Systemic hypertension  
Epilepsy and recurrent seizures  
Psychiatric disorders  
Oncologic disorder  
Fraternal:  
Psychiatric disorders  
  
** Care Team **  
- Doreen Cabrera CNP  
  
  
[facility]- History general Narrative - Reported  
  
Includes: Medical History in patient's chart  
  
  
  
                                        Description         Last Updated  
   
                                        No Safety Measures  2024  
  
  
  
                                                    Last Documented On   
4 4:15PM ; [facility]  
  
  
  
                                        POTS                2024  
  
  
  
                                                    Last Documented On   
4 6:51PM ; [facility]  
  
  
  
                                        0 previous live birth(s) 2024  
  
  
  
                                                    Last Documented On   
4 7:50PM ; [facility]  
  
  
  
                                        Previously pregnant 1 time(s) 2024  
  
  
  
                                                    Last Documented On   
4 7:50PM ; [facility]  
  
  
  
                                        Recent immunization for flu 2024  
  
  
  
                                                    Last Documented On   
4 7:50PM ; [facility]  
  
  
  
                                        Has sex without a condom 2022  
  
  
  
                                                    Last Documented On   
2 4:04PM ; [facility]  
  
  
  
                                        Not planning to have a baby in the next   
12 months 2022  
  
  
  
                                                    Last Documented On   
2 4:04PM ; [facility]  
  
  
  
                                        Partners sexually transmitted infection   
status known 2022  
  
  
  
                                                    Last Documented On   
2 4:04PM ; [facility]  
  
  
  
                                        No previous hospitalizations 2022  
  
  
  
                                                    Last Documented On   
2 4:04PM ; [facility]  
  
  
  
                                        Chronic illness     2021  
  
  
  
                                                    Last Documented On   
1 7:49AM ; [facility]  
  
  
  
                                        Exposure to COVID-19 2021  
  
  
  
                                                    Last Documented On   
1 12:31PM ; [facility]  
  
  
  
                                        History of gynecologic disorder 20  
21  
  
  
  
                                                    Last Documented On   
1 4:06PM ; [facility]  
  
  
  
                                        History of Polycystic Ovarian Syndrome (  
PCOS) 2021  
  
  
  
                                                    Last Documented On   
1 4:06PM ; [facility]  
  
  
  
                                        History of anxiety disorder NOS 20  
21  
  
  
  
                                                    Last Documented On   
1 4:06PM ; [facility]  
  
  
  
                                        History of migraine headache 2021  
  
  
  
                                                    Last Documented On   
1 4:06PM ; [facility]  
  
  
  
                                        History of psychiatric disorders biopola  
r disorder 2021  
  
  
  
                                                    Last Documented On   
1 4:06PM ; [facility]  
Work Phone: [phone]- History general Narrative - Reported  
  
Includes: Medical History in patient's chart  
  
  
  
                                        Description         Last Updated  
   
                                        No Safety Measures  2024  
  
  
  
                                                    Last Documented On   
4 4:15PM ; [facility]  
  
  
  
                                        Consent form on file for procedure   
  
  
  
                                                    Last Documented On 10/04/202  
4 1:56PM ; [facility]  
  
  
  
                                        POTS                2024  
  
  
  
                                                    Last Documented On   
4 6:51PM ; [facility]  
  
  
  
                                        0 previous live birth(s) 2024  
  
  
  
                                                    Last Documented On   
4 7:50PM ; [facility]  
  
  
  
                                        Previously pregnant 1 time(s) 2024  
  
  
  
                                                    Last Documented On   
4 7:50PM ; [facility]  
  
  
  
                                        Recent immunization for flu 2024  
  
  
  
                                                    Last Documented On   
4 7:50PM ; [facility]  
  
  
  
                                        Has sex without a condom 2022  
  
  
  
                                                    Last Documented On   
2 4:04PM ; [facility]  
  
  
  
                                        Not planning to have a baby in the next   
12 months 2022  
  
  
  
                                                    Last Documented On   
2 4:04PM ; [facility]  
  
  
  
                                        Partners sexually transmitted infection   
status known 2022  
  
  
  
                                                    Last Documented On   
2 4:04PM ; [facility]  
  
  
  
                                        No previous hospitalizations 2022  
  
  
  
                                                    Last Documented On   
2 4:04PM ; [facility]  
  
  
  
                                        Chronic illness     2021  
  
  
  
                                                    Last Documented On   
1 7:49AM ; [facility]  
  
  
  
                                        Exposure to COVID-19 2021  
  
  
  
                                                    Last Documented On   
1 12:31PM ; [facility]  
  
  
  
                                        History of gynecologic disorder 20  
21  
  
  
  
                                                    Last Documented On   
1 4:06PM ; [facility]  
  
  
  
                                        History of Polycystic Ovarian Syndrome (  
PCOS) 2021  
  
  
  
                                                    Last Documented On   
1 4:06PM ; [facility]  
  
  
  
                                        History of anxiety disorder NOS 20  
21  
  
  
  
                                                    Last Documented On   
1 4:06PM ; [facility]  
  
  
  
                                        History of migraine headache 2021  
  
  
  
                                                    Last Documented On   
1 4:06PM ; [facility]  
  
  
  
                                        History of psychiatric disorders biopola  
r disorder 2021  
  
  
  
                                                    Last Documented On   
1 4:06PM ; [facility]  
Work Phone: [phone]- History general Narrative - Reported  
  
Includes: Medical History in patient's chart  
  
  
  
                                        Description         Last Updated  
   
                                        No Safety Measures  2024  
  
  
  
                                                    Last Documented On   
4 4:15PM ; [facility]  
  
  
  
                                        Consent form on file for procedure   
  
  
  
                                                    Last Documented On 10/04/202  
4 1:56PM ; [facility]  
  
  
  
                                        POTS                2024  
  
  
  
                                                    Last Documented On   
4 6:51PM ; [facility]  
  
  
  
                                        0 previous live birth(s) 2024  
  
  
  
                                                    Last Documented On   
4 7:50PM ; [facility]  
  
  
  
                                        Previously pregnant 1 time(s) 2024  
  
  
  
                                                    Last Documented On   
4 7:50PM ; [facility]  
  
  
  
                                        Recent immunization for flu 2024  
  
  
  
                                                    Last Documented On   
4 7:50PM ; [facility]  
  
  
  
                                        Has sex without a condom 2022  
  
  
  
                                                    Last Documented On   
2 4:04PM ; [facility]  
  
  
  
                                        Not planning to have a baby in the next   
12 months 2022  
  
  
  
                                                    Last Documented On   
2 4:04PM ; [facility]  
  
  
  
                                        Partners sexually transmitted infection   
status known 2022  
  
  
  
                                                    Last Documented On   
2 4:04PM ; [facility]  
  
  
  
                                        No previous hospitalizations 2022  
  
  
  
                                                    Last Documented On   
2 4:04PM ; [facility]  
  
  
  
                                        Chronic illness     2021  
  
  
  
                                                    Last Documented On   
1 7:49AM ; [facility]  
  
  
  
                                        Exposure to COVID-19 2021  
  
  
  
                                                    Last Documented On   
1 12:31PM ; [facility]  
  
  
  
                                        History of gynecologic disorder 20  
21  
  
  
  
                                                    Last Documented On   
1 4:06PM ; [facility]  
  
  
  
                                        History of Polycystic Ovarian Syndrome (  
PCOS) 2021  
  
  
  
                                                    Last Documented On   
1 4:06PM ; [facility]  
  
  
  
                                        History of anxiety disorder NOS 20  
21  
  
  
  
                                                    Last Documented On   
1 4:06PM ; [facility]  
  
  
  
                                        History of migraine headache 2021  
  
  
  
                                                    Last Documented On   
1 4:06PM ; [facility]  
  
  
  
                                        History of psychiatric disorders biopola  
r disorder 2021  
  
  
  
                                                    Last Documented On   
1 4:06PM ; [facility]  
Work Phone: [phone]- History general Narrative - Reported  
  
Includes: Medical History in patient's chart  
  
  
  
                                        Description         Last Updated  
   
                                        No Safety Measures  2024  
  
  
  
                                                    Last Documented On   
4 4:15PM ; [facility]  
  
  
  
                                        Consent form on file for procedure   
  
  
  
                                                    Last Documented On 10/09/202  
4 2:09PM ; [facility]  
  
  
  
                                        POTS                2024  
  
  
  
                                                    Last Documented On   
4 6:51PM ; [facility]  
  
  
  
                                        0 previous live birth(s) 2024  
  
  
  
                                                    Last Documented On   
4 7:50PM ; [facility]  
  
  
  
                                        Previously pregnant 1 time(s) 2024  
  
  
  
                                                    Last Documented On   
4 7:50PM ; [facility]  
  
  
  
                                        Recent immunization for flu 2024  
  
  
  
                                                    Last Documented On   
4 7:50PM ; [facility]  
  
  
  
                                        Has sex without a condom 2022  
  
  
  
                                                    Last Documented On   
2 4:04PM ; [facility]  
  
  
  
                                        Not planning to have a baby in the next   
12 months 2022  
  
  
  
                                                    Last Documented On   
2 4:04PM ; [facility]  
  
  
  
                                        Partners sexually transmitted infection   
status known 2022  
  
  
  
                                                    Last Documented On   
2 4:04PM ; [facility]  
  
  
  
                                        No previous hospitalizations 2022  
  
  
  
                                                    Last Documented On   
2 4:04PM ; [facility]  
  
  
  
                                        Chronic illness     2021  
  
  
  
                                                    Last Documented On   
1 7:49AM ; [facility]  
  
  
  
                                        Exposure to COVID-19 2021  
  
  
  
                                                    Last Documented On   
1 12:31PM ; [facility]  
  
  
  
                                        History of gynecologic disorder 20  
21  
  
  
  
                                                    Last Documented On   
1 4:06PM ; [facility]  
  
  
  
                                        History of Polycystic Ovarian Syndrome (  
PCOS) 2021  
  
  
  
                                                    Last Documented On   
1 4:06PM ; [facility]  
  
  
  
                                        History of anxiety disorder NOS 20  
21  
  
  
  
                                                    Last Documented On   
1 4:06PM ; [facility]  
  
  
  
                                        History of migraine headache 2021  
  
  
  
                                                    Last Documented On   
1 4:06PM ; [facility]  
  
  
  
                                        History of psychiatric disorders biopola  
r disorder 2021  
  
  
  
                                                    Last Documented On   
1 4:06PM ; [facility]  
Work Phone: [phone]- History general Narrative - Reported  
  
Includes: Medical History in patient's chart  
  
  
  
                                        Description         Last Updated  
   
                                        No Safety Measures  2024  
  
  
  
                                                    Last Documented On   
4 4:15PM ; [facility]  
  
  
  
                                        Consent form on file for procedure   
  
  
  
                                                    Last Documented On 10/04/202  
4 1:56PM ; [facility]  
  
  
  
                                        POTS                2024  
  
  
  
                                                    Last Documented On   
4 6:51PM ; [facility]  
  
  
  
                                        0 previous live birth(s) 2024  
  
  
  
                                                    Last Documented On   
4 7:50PM ; [facility]  
  
  
  
                                        Previously pregnant 1 time(s) 2024  
  
  
  
                                                    Last Documented On   
4 7:50PM ; [facility]  
  
  
  
                                        Recent immunization for flu 2024  
  
  
  
                                                    Last Documented On   
4 7:50PM ; [facility]  
  
  
  
                                        Has sex without a condom 2022  
  
  
  
                                                    Last Documented On   
2 4:04PM ; [facility]  
  
  
  
                                        Not planning to have a baby in the next   
12 months 2022  
  
  
  
                                                    Last Documented On   
2 4:04PM ; [facility]  
  
  
  
                                        Partners sexually transmitted infection   
status known 2022  
  
  
  
                                                    Last Documented On   
2 4:04PM ; [facility]  
  
  
  
                                        No previous hospitalizations 2022  
  
  
  
                                                    Last Documented On   
2 4:04PM ; [facility]  
  
  
  
                                        Chronic illness     2021  
  
  
  
                                                    Last Documented On   
1 7:49AM ; [facility]  
  
  
  
                                        Exposure to COVID-19 2021  
  
  
  
                                                    Last Documented On   
1 12:31PM ; [facility]  
  
  
  
                                        History of gynecologic disorder 20  
21  
  
  
  
                                                    Last Documented On   
1 4:06PM ; [facility]  
  
  
  
                                        History of Polycystic Ovarian Syndrome (  
PCOS) 2021  
  
  
  
                                                    Last Documented On   
1 4:06PM ; [facility]  
  
  
  
                                        History of anxiety disorder NOS 20  
21  
  
  
  
                                                    Last Documented On   
1 4:06PM ; [facility]  
  
  
  
                                        History of migraine headache 2021  
  
  
  
                                                    Last Documented On   
1 4:06PM ; [facility]  
  
  
  
                                        History of psychiatric disorders biopola  
r disorder 2021  
  
  
  
                                                    Last Documented On   
1 4:06PM ; [facility]  
Work Phone: [phone]- Evaluation note  
  
Includes: Assessments for all patient encounters  
  
  
  
                                Findings        Encounter       Date  
   
                                                    Attention-deficit hyperactiv  
ity   
disorder                                 Established Patient with Radha Young LSW                               2024  
  
  
  
                                                    Last Documented On   
4 4:15PM ; [facility]  
  
  
  
                                                    Bipolar I disorder, most rec  
ent   
episode, depressed - mild                Established Patient with Radha Young LSW                               2024  
  
  
  
                                                    Last Documented On   
4 4:15PM ; [facility]  
  
  
  
                                Nicotine dependence  Established Patient with   
Radha Young LSW 2024  
  
  
  
                                                    Last Documented On   
4 4:15PM ; [facility]  
  
  
  
                                        Post-traumatic stress disorder  Establ  
ished Patient with Radha Young   
LSW                                     2024  
  
  
  
                                                    Last Documented On   
4 4:15PM ; [facility]  
  
  
  
                                                    [Z68.43 - Body mass index [B  
MI]   
50.0-59.9, adult] assessment of body   
mass index                              Medical Established Patient with   
Doreen Cabrera CNP                        2024  
  
  
  
                                                    Last Documented On   
4 5:46PM ; [facility]  
  
  
  
                                                    Bipolar I disorder, most rec  
ent   
episode, depressed - mild               Medical Established Patient with Doreen   
Deborah CNP                              2024  
  
  
  
                                                    Last Documented On   
4 5:46PM ; [facility]  
  
  
  
                                Caries          Medical Established Patient with  
 Doreen Deborah CNP 2024  
  
  
  
                                                    Last Documented On   
4 5:46PM ; [facility]  
  
  
  
                                        Encounter for Immunization Medical Estab  
lished Patient with Doreen Deborah   
CNP                                     2024  
  
  
  
                                                    Last Documented On   
4 5:46PM ; [facility]  
  
  
  
                                                    Type 2 diabetes mellitus wit  
hout   
complication                            Medical Established Patient with   
Doreen Deborah CNP                        2024  
  
  
  
                                                    Last Documented On   
4 5:46PM ; [facility]  
  
  
  
                                        Attention-deficit hyperactivity disorder  
  Established Patient with   
Leonie Short LISWS                     2024  
  
  
  
                                                    Last Documented On   
4 3:28PM ; [facility]  
  
  
  
                                                    Bipolar I disorder, most rec  
ent   
episode, depressed - mild               BH Established Patient with Leonie   
Short LISWS                             2024  
  
  
  
                                                    Last Documented On   
4 3:28PM ; [facility]  
  
  
  
                                        Post-traumatic stress disorder  Establ  
ished Patient with Leonie Short   
LISWS                                   2024  
  
  
  
                                                    Last Documented On   
4 3:28PM ; [facility]  
  
  
  
                                                    [E11.9 - Type 2 diabetes lobito  
litus   
without complications] type 2 diabetes   
mellitus                                Medical Established Patient with   
Doreen Cabrera CNP                        2024  
  
  
  
                                                    Last Documented On   
4 7:36PM ; [facility]  
  
  
  
                                                    [F41.1 - Generalized anxiety  
   
disorder] generalized anxiety   
disorder                                Medical Established Patient with   
Doreen Cabrera CNP                        2024  
  
  
  
                                                    Last Documented On   
4 7:36PM ; [facility]  
  
  
  
                                                    [I10 - Essential (primary)   
hypertension] essential hypertension    Medical Established Patient with   
Doreen Cabrera CNP                        2024  
  
  
  
                                                    Last Documented On   
4 7:36PM ; [facility]  
  
  
  
                                                    [N94.6 - Dysmenorrhea, unspe  
cified]   
dysmenorrhea                            Medical Established Patient with   
Doreen Cabrera CNP                        2024  
  
  
  
                                                    Last Documented On   
4 7:36PM ; [facility]  
  
  
  
                                                    [Z68.43 - Body mass index [B  
MI]   
50.0-59.9, adult] assessment of body   
mass index                              Medical Established Patient with   
Doreen Cabrera CNP                        2024  
  
  
  
                                                    Last Documented On   
4 7:36PM ; [facility]  
  
  
  
                                        Encounter for Immunization Medical Estab  
lished Patient with Doreen Cabrera   
CNP                                     2024  
  
  
  
                                                    Last Documented On   
4 7:36PM ; [facility]  
  
  
  
                                        Venipuncture was performed Medical Estab  
lished Patient with Doreen Cabrera   
CNP                                     2024  
  
  
  
                                                    Last Documented On   
4 7:36PM ; [facility]  
  
  
  
                                        Attention-deficit hyperactivity disorder  
  Established Patient with   
Leonie Short LISWS                     2024  
  
  
  
                                                    Last Documented On   
4 10:10AM ; [facility]  
  
  
  
                                                    Bipolar I disorder, most rec  
ent   
episode, depressed - mild                Established Patient with Leonie   
Short LISWS                             2024  
  
  
  
                                                    Last Documented On   
4 10:10AM ; [facility]  
  
  
  
                                        Post-traumatic stress disorder  Establ  
ished Patient with Leonie Short   
LISWS                                   2024  
  
  
  
                                                    Last Documented On   
4 10:10AM ; [facility]  
  
  
  
                                        [D64.9 - Anemia, unspecified] anemia Med  
ical Established Patient with   
Doreenpaola Cabrera CNP                        2024  
  
  
  
                                                    Last Documented On   
4 6:51PM ; [facility]  
  
  
  
                                                    [Z68.43 - Body mass index [B  
MI]   
50.0-59.9, adult] assessment of body   
mass index                              Medical Established Patient with   
Doreen Cabrera CNP                        2024  
  
  
  
                                                    Last Documented On   
4 6:51PM ; [facility]  
  
  
  
                                                    Bipolar affective disorder,   
current   
episode depressed, mild                  Established Patient with Leonie   
Short LISWS                             2024  
  
  
  
                                                    Last Documented On   
4 5:16PM ; [facility]  
  
  
  
                                        Post-traumatic stress disorder  Establ  
ished Patient with Leonie Short   
LISWS                                   2024  
  
  
  
                                                    Last Documented On   
4 5:16PM ; [facility]  
  
  
  
                                                    Undifferentiated attention d  
eficit   
disorder                                 Established Patient with   
Leonie Short LISWS                     2024  
  
  
  
                                                    Last Documented On   
4 5:16PM ; [facility]  
  
  
  
                                        Visit for: screening for disorder  Est  
ablished Patient with Leonie   
Short LISWS                             2024  
  
  
  
                                                    Last Documented On   
4 5:16PM ; [facility]  
  
  
  
                                                    [Z68.43 - Body mass index [B  
MI]   
50.0-59.9, adult] assessment of body   
mass index                              Medical Established Patient with   
Doreen Cabrera CNP                        2024  
  
  
  
                                                    Last Documented On   
4 7:50PM ; [facility]  
  
  
  
                                        Attention-deficit hyperactivity disorder  
 Medical Established Patient with   
Doreen Cabrera CNP                        2024  
  
  
  
                                                    Last Documented On   
4 7:50PM ; [facility]  
  
  
  
                                                    Diabetes Risk Test Score was  
 three   
score 3/27/2024                         Medical Established Patient with Doreen Cabrera CNP                              2024  
  
  
  
                                                    Last Documented On   
4 7:50PM ; [facility]  
  
  
  
                                        Visit for: screening for STD Medical Est  
ablished Patient with Doreen Cabrera   
CNP                                     2024  
  
  
  
                                                    Last Documented On   
4 7:50PM ; [facility]  
  
  
  
                                                    [Z68.32 - Body mass index [B  
MI]   
32.0-32.9, adult] assessment of body   
mass index                              Medical Established Patient with   
Doreen Cabrera [facility]                        2023  
  
  
  
                                                    Last Documented On   
3 6:01PM ; [facility]  
  
  
  
                                        Borderline personality disorder Medical   
Established Patient with Doreen Cabrera [facility]                              2023  
  
  
  
                                                    Last Documented On   
3 6:01PM ; [facility]  
  
  
  
                                        Screening for diabetes mellitus Medical   
Established Patient with Doreen Cabrera [facility]                              2023  
  
  
  
                                                    Last Documented On   
3 6:01PM ; [facility]  
  
  
  
                                        Assessment of body mass index Medical Es  
tablished Patient with Doreen Cabrera [facility]                              10/21/2022  
  
  
  
                                                    Last Documented On 10/21/202  
2 2:45PM ; [facility]  
  
  
  
                                                    Bipolar affective disorder,   
current   
episode manic                            [facility] with Haylienicanor Aguilar [facility]S                        2022  
  
  
  
                                                    Last Documented On   
2 3:22PM ; [facility]  
  
  
  
                                                    Bipolar affective disorder,   
current   
episode manic                            Established Patient with Haylienicanor Aguilar LPCC-S                        2022  
  
  
  
                                                    Last Documented On   
2 2:28PM ; [facility]  
  
  
  
                                                    Bipolar affective disorder,   
current   
episode depressed, severe with   
psychosis                                [facility] with Haylienicanor Aguilar [facility]                        2022  
  
  
  
                                                    Last Documented On   
2 3:29PM ; [facility]  
  
  
  
                                                    Assessment of body mass inde  
x [Body   
mass index [BMI] 50.0-59.9, adult]      Open Access - Established with Julieth Pisano CNP                               2022  
  
  
  
                                                    Last Documented On   
2 9:36AM ; [facility]  
  
  
  
                                                    Bipolar I disorder, most rec  
ent   
episode, manic                          Open Access - Established with Julieth   
Aliya CNP                               2022  
  
  
  
                                                    Last Documented On   
2 9:36AM ; [facility]  
  
  
  
                                        Post-traumatic stress disorder BH Establ  
ished Patient with Haylienicanor Aguilar   
[facility]                                  2022  
  
  
  
                                                    Last Documented On   
2 3:20PM ; [facility]  
  
  
  
                                No cough        Medical Established Patient with  
 Doreenpaola Cabrera CNP 2022  
  
  
  
                                                    Last Documented On   
2 4:04PM ; [facility]  
  
  
  
                                                    Z68.43 - Body mass index [BM  
I]   
50.0-59.9, adult                        Medical Established Patient with   
Doreenpaola Cabrera CNP                        2022  
  
  
  
                                                    Last Documented On   
2 4:04PM ; [facility]  
  
  
  
                                                    Borderline personality disor  
danny Pt   
reported hx of sx/dx                     Established Patient with Haylie   
Aguilar [facility]S                        2022  
  
  
  
                                                    Last Documented On   
2 4:08PM ; [facility]  
  
  
  
                                                    Assessment of body mass inde  
x [Body   
mass index [BMI] 50.0-59.9, adult]      Open Access - Established with Julieth   
Aliya CNP                               2022  
  
  
  
                                                    Last Documented On   
2 7:41PM ; [facility]  
  
  
  
                                                    Diabetes Risk Test Score was  
 three   
score 2022                         Open Access - Established with Julieth   
Aliya CNP                               2022  
  
  
  
                                                    Last Documented On   
2 7:41PM ; [facility]  
  
  
  
                                                    Bipolar I disorder, most rec  
ent   
episode, manic                           Established Patient with Avis   
Felder LP[facility]S                          2021  
  
  
  
                                                    Last Documented On   
1 1:35AM ; [facility]  
  
  
  
                                        Borderline personality disorder BH Estab  
lished Patient with Avis   
Felder LP[facility]S                          2021  
  
  
  
                                                    Last Documented On   
1 1:35AM ; [facility]  
  
  
  
                                        Post-traumatic stress disorder BH Establ  
ished Patient with Avis   
Felder LP[facility]-S                          2021  
  
  
  
                                                    Last Documented On   
1 1:35AM ; [facility]  
  
  
  
                                                    Assessment of visit for: bhargavi myles for   
human immunodeficiency virus            Medical Established Patient with   
Doreen Cabrera CNP                        2021  
  
  
  
                                                    Last Documented On   
1 2:56PM ; [facility]  
  
  
  
                                Nicotine dependence Medical Established Patient   
with Doreen Deborah CNP 2021  
  
  
  
                                                    Last Documented On   
1 2:56PM ; [facility]  
  
  
  
                                Tachycardia     Medical Established Patient with  
 Doreen Deborah CNP 2021  
  
  
  
                                                    Last Documented On   
1 2:56PM ; [facility]  
  
  
  
                                                    Z68.43 - Body mass index [BM  
I]   
50.0-59.9, adult                        Medical Established Patient with   
Doreen Deborah CNP                        2021  
  
  
  
                                                    Last Documented On   
1 2:56PM ; [facility]  
  
  
  
                                                    Bipolar I disorder, most rec  
ent   
episode, manic                           Established Patient with Leonie   
Short LISWS                             2021  
  
  
  
                                                    Last Documented On   
1 10:17AM ; [facility]  
  
  
  
                                                    Borderline personality disor  
danny per   
patient reported history                 Established Patient with Leonie   
Short [facility]                             2021  
  
  
  
                                                    Last Documented On   
1 10:17AM ; [facility]  
  
  
  
                                Nicotine dependence  Established Patient with   
Leonie Short LISWS 2021  
  
  
  
                                                    Last Documented On   
1 10:17AM ; [facility]  
  
  
  
                                        Post-traumatic stress disorder  Establ  
ished Patient with Leonie Short   
LISWS                                   2021  
  
  
  
                                                    Last Documented On   
1 10:17AM ; [facility]  
  
  
  
                                Body mass index Medical Established Patient with  
 Doreen Deborah CNP 2021  
  
  
  
                                                    Last Documented On   
1 5:23PM ; [facility]  
  
  
  
                                Morbid obesity  Medical Established Patient with  
 Doreen Deborah CNP 2021  
  
  
  
                                                    Last Documented On   
1 5:23PM ; [facility]  
  
  
  
                                        Nicotine dependence uncomplicated Medica  
l Established Patient with Doreen   
Deborah CNP                              2021  
  
  
  
                                                    Last Documented On   
1 5:23PM ; [facility]  
  
  
  
                                                    Z68.42 - Body mass index [BM  
I]   
45.0-49.9, adult                        Medical Established Patient with   
Doreen Deborah CNP                        2021  
  
  
  
                                                    Last Documented On   
1 5:23PM ; [facility]  
  
  
  
                                Episodic mood disorders On Call with Pamela edwards CNP 2021  
  
  
  
                                                    Last Documented On   
1 7:49AM ; [facility]  
  
  
  
                                                    Mood disorders, NOS per tabatha  
ent   
reported history                         Established Patient with Leonie   
Short [facility]                             2021  
  
  
  
                                                    Last Documented On   
1 7:15PM ; [facility]  
  
  
  
                                        Post-traumatic stress disorder  Establ  
ished Patient with Leonie Short   
LISWS                                   2021  
  
  
  
                                                    Last Documented On   
1 7:15PM ; [facility]  
  
  
  
                                Morbid obesity  Medical Established Patient with  
 Doreen Deborah CNP 2021  
  
  
  
                                                    Last Documented On   
1 12:31PM ; [facility]  
  
  
  
                                Otitis externa  Medical Established Patient with  
 Doreen Deborah CNP 2021  
  
  
  
                                                    Last Documented On   
1 12:31PM ; [facility]  
  
  
  
                                                    Z68.42 - Body mass index [BM  
I]   
45.0-49.9, adult                        Medical Established Patient with   
Doreen Deborah CNP                        2021  
  
  
  
                                                    Last Documented On   
1 12:31PM ; [facility]  
  
  
  
                                        Post-traumatic stress disorder  Establ  
ished Patient with Leonie Short   
LISWS                                   06/10/2021  
  
  
  
                                                    Last Documented On 06/10/202  
1 10:05PM ; [facility]  
  
  
  
                                Morbid obesity  Medical Established Patient with  
 Doreen Deborah CNP 06/10/2021  
  
  
  
                                                    Last Documented On 06/10/202  
1 4:45PM ; [facility]  
  
  
  
                                                    Z68.42 - Body mass index [BM  
I]   
45.0-49.9, adult                        Medical Established Patient with   
Doreen Deborah CNP                        06/10/2021  
  
  
  
                                                    Last Documented On 06/10/202  
1 4:45PM ; [facility]  
  
  
  
                                        Post-traumatic stress disorder  Establ  
ished Patient with Leonie Short   
LISWS                                   2021  
  
  
  
                                                    Last Documented On   
1 11:59AM ; [facility]  
  
  
  
                                                    Assessment of visit for: bhargavi guevaraning for   
human immunodeficiency virus            Medical New Patient with Doreenpaola Mccormacken CNP                              2021  
  
  
  
                                                    Last Documented On   
1 4:06PM ; [facility]  
  
  
  
                                                    Diabetes Risk Test Score was  
 one score   
2021                               Medical New Patient with Doreen   
Deborah CNP                              2021  
  
  
  
                                                    Last Documented On   
1 4:06PM ; [facility]  
  
  
  
                                Hypertension    Medical New Patient with Doreen DAVID esposito CNP 2021  
  
  
  
                                                    Last Documented On   
1 4:06PM ; [facility]  
  
  
  
                                Morbid obesity  Medical New Patient with Doreen C  
cate CNP 2021  
  
  
  
                                                    Last Documented On   
1 4:06PM ; [facility]  
  
  
  
                                Post-traumatic stress disorder Medical New Patie  
nt with Doreen Cabrera CNP   
2021  
  
  
  
                                                    Last Documented On   
1 4:06PM ; [facility]  
  
  
  
                                                    Z68.42 - Body mass index [BM  
I]   
45.0-49.9, adult                        Medical New Patient with Doreen Cabrera CNP                              2021  
  
  
  
                                                    Last Documented On   
1 4:06PM ; [facility]  
Work Phone: [phone]- Evaluation note  
  
Includes: Assessments for all patient encounters  
  
  
  
                                Findings        Encounter       Date  
   
                                                    Attention-deficit hyperactiv  
ity   
disorder                                 Established Patient with Radha Young [facility]                               2024  
  
  
  
                                                    Last Documented On   
4 4:15PM ; [facility]  
  
  
  
                                                    Bipolar I disorder, most rec  
ent   
episode, depressed - mild               BH Established Patient with Radha Young [facility]                               2024  
  
  
  
                                                    Last Documented On   
4 4:15PM ; [facility]  
  
  
  
                                Nicotine dependence  Established Patient with   
Radha Young [facility] 2024  
  
  
  
                                                    Last Documented On   
4 4:15PM ; [facility]  
  
  
  
                                        Post-traumatic stress disorder BH Establ  
ished Patient with Radha Young   
[facility]                                     2024  
  
  
  
                                                    Last Documented On   
4 4:15PM ; [facility]  
  
  
  
                                                    [Z68.43 - Body mass index [B  
MI]   
50.0-59.9, adult] assessment of body   
mass index                              Medical Established Patient with   
Doreen Cabrera CNP                        2024  
  
  
  
                                                    Last Documented On 10/04/202  
4 1:56PM ; [facility]  
  
  
  
                                                    Bipolar I disorder, most rec  
ent   
episode, depressed - mild               Medical Established Patient with Doreenpaola Mccormacken CNP                              2024  
  
  
  
                                                    Last Documented On 10/04/202  
4 1:56PM ; [facility]  
  
  
  
                                Caries          Medical Established Patient with  
 Doreenpaola Mccormacken CNP 2024  
  
  
  
                                                    Last Documented On 10/04/202  
4 1:56PM ; [facility]  
  
  
  
                                        Encounter for Immunization Medical Estab  
lished Patient with Doreen Deborah   
CNP                                     2024  
  
  
  
                                                    Last Documented On 10/04/202  
4 1:56PM ; [facility]  
  
  
  
                                                    Type 2 diabetes mellitus wit  
hout   
complication                            Medical Established Patient with   
Doreen Cabrera CNP                        2024  
  
  
  
                                                    Last Documented On 10/04/202  
4 1:56PM ; [facility]  
  
  
  
                                        Attention-deficit hyperactivity disorder  
  Established Patient with   
Leonie Short [facility]                     2024  
  
  
  
                                                    Last Documented On   
4 3:28PM ; [facility]  
  
  
  
                                                    Bipolar I disorder, most rec  
ent   
episode, depressed - mild                Established Patient with Leonie   
Short [facility]                             2024  
  
  
  
                                                    Last Documented On   
4 3:28PM ; [facility]  
  
  
  
                                        Post-traumatic stress disorder  Establ  
ished Patient with Leonie Short   
LISWS                                   2024  
  
  
  
                                                    Last Documented On   
4 3:28PM ; [facility]  
  
  
  
                                                    [E11.9 - Type 2 diabetes lobito  
litus   
without complications] type 2 diabetes   
mellitus                                Medical Established Patient with   
Doreen Cabrera CNP                        2024  
  
  
  
                                                    Last Documented On   
4 7:36PM ; [facility]  
  
  
  
                                                    [F41.1 - Generalized anxiety  
   
disorder] generalized anxiety   
disorder                                Medical Established Patient with   
Doreen Cabrera CNP                        2024  
  
  
  
                                                    Last Documented On   
4 7:36PM ; [facility]  
  
  
  
                                                    [I10 - Essential (primary)   
hypertension] essential hypertension    Medical Established Patient with   
Doreen Cabrera CNP                        2024  
  
  
  
                                                    Last Documented On   
4 7:36PM ; [facility]  
  
  
  
                                                    [N94.6 - Dysmenorrhea, unspe  
cified]   
dysmenorrhea                            Medical Established Patient with   
Doreen Cabrera CNP                        2024  
  
  
  
                                                    Last Documented On   
4 7:36PM ; [facility]  
  
  
  
                                                    [Z68.43 - Body mass index [B  
MI]   
50.0-59.9, adult] assessment of body   
mass index                              Medical Established Patient with   
Doreen Cabrera CNP                        2024  
  
  
  
                                                    Last Documented On   
4 7:36PM ; [facility]  
  
  
  
                                        Encounter for Immunization Medical Estab  
lished Patient with Doreenpaola Cabrera   
CNP                                     2024  
  
  
  
                                                    Last Documented On   
4 7:36PM ; [facility]  
  
  
  
                                        Venipuncture was performed Medical Estab  
lished Patient with Doreen Cabrera   
CNP                                     2024  
  
  
  
                                                    Last Documented On   
4 7:36PM ; [facility]  
  
  
  
                                        Attention-deficit hyperactivity disorder  
  Established Patient with   
Leonie Short [facility]                     2024  
  
  
  
                                                    Last Documented On   
4 10:10AM ; [facility]  
  
  
  
                                                    Bipolar I disorder, most rec  
ent   
episode, depressed - mild               BH Established Patient with Leonie   
Short [facility]                             2024  
  
  
  
                                                    Last Documented On   
4 10:10AM ; [facility]  
  
  
  
                                        Post-traumatic stress disorder  Establ  
ished Patient with Leonie Short   
LIS[facility]                                   2024  
  
  
  
                                                    Last Documented On   
4 10:10AM ; [facility]  
  
  
  
                                        [D64.9 - Anemia, unspecified] anemia Med  
ical Established Patient with   
Doreenpaola Cabrera CNP                        2024  
  
  
  
                                                    Last Documented On   
4 6:51PM ; [facility]  
  
  
  
                                                    [Z68.43 - Body mass index [B  
MI]   
50.0-59.9, adult] assessment of body   
mass index                              Medical Established Patient with   
Doreen Cabrera CNP                        2024  
  
  
  
                                                    Last Documented On   
4 6:51PM ; [facility]  
  
  
  
                                                    Bipolar affective disorder,   
current   
episode depressed, mild                  Established Patient with Leonie   
Short [facility]                             2024  
  
  
  
                                                    Last Documented On   
4 5:16PM ; [facility]  
  
  
  
                                        Post-traumatic stress disorder  Establ  
ished Patient with Leonie Short   
LISWS                                   2024  
  
  
  
                                                    Last Documented On   
4 5:16PM ; [facility]  
  
  
  
                                                    Undifferentiated attention d  
eficit   
disorder                                 Established Patient with   
Leonie Short [facility]                     2024  
  
  
  
                                                    Last Documented On   
4 5:16PM ; [facility]  
  
  
  
                                        Visit for: screening for disorder  Est  
ablished Patient with Leonie   
Short [facility]                             2024  
  
  
  
                                                    Last Documented On   
4 5:16PM ; [facility]  
  
  
  
                                                    [Z68.43 - Body mass index [B  
MI]   
50.0-59.9, adult] assessment of body   
mass index                              Medical Established Patient with   
Doreen Cabrera CNP                        2024  
  
  
  
                                                    Last Documented On   
4 7:50PM ; [facility]  
  
  
  
                                        Attention-deficit hyperactivity disorder  
 Medical Established Patient with   
Doreen Cabrera CNP                        2024  
  
  
  
                                                    Last Documented On   
4 7:50PM ; [facility]  
  
  
  
                                                    Diabetes Risk Test Score was  
 three   
score 3/27/2024                         Medical Established Patient with Doreen Cabrera CNP                              2024  
  
  
  
                                                    Last Documented On   
4 7:50PM ; [facility]  
  
  
  
                                        Visit for: screening for STD Medical Est  
ablished Patient with Doreen Cabrera   
CNP                                     2024  
  
  
  
                                                    Last Documented On   
4 7:50PM ; [facility]  
  
  
  
                                                    [Z68.32 - Body mass index [B  
MI]   
32.0-32.9, adult] assessment of body   
mass index                              Medical Established Patient with   
Doreen Cabrera CNP                        2023  
  
  
  
                                                    Last Documented On   
3 6:01PM ; [facility]  
  
  
  
                                        Borderline personality disorder Medical   
Established Patient with Doreen Cabrera CNP                              2023  
  
  
  
                                                    Last Documented On   
3 6:01PM ; [facility]  
  
  
  
                                        Screening for diabetes mellitus Medical   
Established Patient with Doreen Cabrera CNP                              2023  
  
  
  
                                                    Last Documented On   
3 6:01PM ; [facility]  
  
  
  
                                        Assessment of body mass index Medical Es  
tablished Patient with Doreen Cabrera CNP                              10/21/2022  
  
  
  
                                                    Last Documented On 10/21/202  
2 2:45PM ; [facility]  
  
  
  
                                                    Bipolar affective disorder,   
current   
episode manic                            [facility] with Haylienicanor Martinerson [facility]-S                        2022  
  
  
  
                                                    Last Documented On   
2 3:22PM ; [facility]  
  
  
  
                                                    Bipolar affective disorder,   
current   
episode manic                            Established Patient with Haylie   
Aguilar LPCC-S                        2022  
  
  
  
                                                    Last Documented On   
2 2:28PM ; [facility]  
  
  
  
                                                    Bipolar affective disorder,   
current   
episode depressed, severe with   
psychosis                                [facility] with Haylie   
Aguilar LPCC-S                        2022  
  
  
  
                                                    Last Documented On   
2 3:29PM ; [facility]  
  
  
  
                                                    Assessment of body mass inde  
x [Body   
mass index [BMI] 50.0-59.9, adult]      Open Access - Established with Julieth Pisano CNP                               2022  
  
  
  
                                                    Last Documented On   
2 9:36AM ; [facility]  
  
  
  
                                                    Bipolar I disorder, most rec  
ent   
episode, manic                          Open Access - Established with Julieth   
Aliya CNP                               2022  
  
  
  
                                                    Last Documented On   
2 9:36AM ; [facility]  
  
  
  
                                        Post-traumatic stress disorder  Establ  
ished Patient with Haylie Aguilar   
LPCC-S                                  2022  
  
  
  
                                                    Last Documented On   
2 3:20PM ; [facility]  
  
  
  
                                No cough        Medical Established Patient with  
 Doreen Deborah CNP 2022  
  
  
  
                                                    Last Documented On   
2 4:04PM ; [facility]  
  
  
  
                                                    Z68.43 - Body mass index [BM  
I]   
50.0-59.9, adult                        Medical Established Patient with   
Doreen Deborah CNP                        2022  
  
  
  
                                                    Last Documented On   
2 4:04PM ; [facility]  
  
  
  
                                                    Borderline personality disor  
danny Pt   
reported hx of sx/dx                     Established Patient with Haylie   
Aguilar LPCC-S                        2022  
  
  
  
                                                    Last Documented On   
2 4:08PM ; [facility]  
  
  
  
                                                    Assessment of body mass inde  
x [Body   
mass index [BMI] 50.0-59.9, adult]      Open Access - Established with Julieth   
Aliya CNP                               2022  
  
  
  
                                                    Last Documented On   
2 7:41PM ; [facility]  
  
  
  
                                                    Diabetes Risk Test Score was  
 three   
score 2022                         Open Access - Established with Julieth   
Aliya CNP                               2022  
  
  
  
                                                    Last Documented On   
2 7:41PM ; [facility]  
  
  
  
                                                    Bipolar I disorder, most rec  
ent   
episode, manic                           Established Patient with Avis   
Felder LPCC-S                          2021  
  
  
  
                                                    Last Documented On   
1 1:35AM ; [facility]  
  
  
  
                                        Borderline personality disorder  Estab  
lished Patient with Avis   
Felder LPCC-S                          2021  
  
  
  
                                                    Last Documented On   
1 1:35AM ; [facility]  
  
  
  
                                        Post-traumatic stress disorder  Establ  
ished Patient with Avis   
Felder LPCC-S                          2021  
  
  
  
                                                    Last Documented On   
1 1:35AM ; [facility]  
  
  
  
                                                    Assessment of visit for: bhargavi myles for   
human immunodeficiency virus            Medical Established Patient with   
Doreen Deborah CNP                        2021  
  
  
  
                                                    Last Documented On   
1 2:56PM ; [facility]  
  
  
  
                                Nicotine dependence Medical Established Patient   
with Doreen Deborah CNP 2021  
  
  
  
                                                    Last Documented On   
1 2:56PM ; [facility]  
  
  
  
                                Tachycardia     Medical Established Patient with  
 Doreen Deborah CNP 2021  
  
  
  
                                                    Last Documented On   
1 2:56PM ; [facility]  
  
  
  
                                                    Z68.43 - Body mass index [BM  
I]   
50.0-59.9, adult                        Medical Established Patient with   
Doreen Deborah CNP                        2021  
  
  
  
                                                    Last Documented On   
1 2:56PM ; [facility]  
  
  
  
                                                    Bipolar I disorder, most rec  
ent   
episode, manic                           Established Patient with Leonie   
Short [facility]                             2021  
  
  
  
                                                    Last Documented On   
1 10:17AM ; [facility]  
  
  
  
                                                    Borderline personality disor  
danny per   
patient reported history                 Established Patient with Leonie   
Short [facility]                             2021  
  
  
  
                                                    Last Documented On   
1 10:17AM ; [facility]  
  
  
  
                                Nicotine dependence  Established Patient with   
Leonie Short LISWS 2021  
  
  
  
                                                    Last Documented On   
1 10:17AM ; [facility]  
  
  
  
                                        Post-traumatic stress disorder  Establ  
ished Patient with Leonie Short   
LISWS                                   2021  
  
  
  
                                                    Last Documented On   
1 10:17AM ; [facility]  
  
  
  
                                Body mass index Medical Established Patient with  
 Doreen Deborah CNP 2021  
  
  
  
                                                    Last Documented On   
1 5:23PM ; [facility]  
  
  
  
                                Morbid obesity  Medical Established Patient with  
 Doreen Deborah CNP 2021  
  
  
  
                                                    Last Documented On   
1 5:23PM ; [facility]  
  
  
  
                                        Nicotine dependence uncomplicated Medica  
l Established Patient with Doreen   
Deborah CNP                              2021  
  
  
  
                                                    Last Documented On   
1 5:23PM ; [facility]  
  
  
  
                                                    Z68.42 - Body mass index [BM  
I]   
45.0-49.9, adult                        Medical Established Patient with   
Doreen Deborah CNP                        2021  
  
  
  
                                                    Last Documented On   
1 5:23PM ; [facility]  
  
  
  
                                Episodic mood disorders On Call with Pamela edwards CNP 2021  
  
  
  
                                                    Last Documented On   
1 7:49AM ; [facility]  
  
  
  
                                                    Mood disorders, NOS per tabatha  
ent   
reported history                         Established Patient with Leonie   
Short LISWS                             2021  
  
  
  
                                                    Last Documented On   
1 7:15PM ; [facility]  
  
  
  
                                        Post-traumatic stress disorder BH Establ  
ished Patient with Leonie Short   
LISWS                                   2021  
  
  
  
                                                    Last Documented On   
1 7:15PM ; [facility]  
  
  
  
                                Morbid obesity  Medical Established Patient with  
 Doreen Deborah CNP 2021  
  
  
  
                                                    Last Documented On   
1 12:31PM ; [facility]  
  
  
  
                                Otitis externa  Medical Established Patient with  
 Doreen Deborah CNP 2021  
  
  
  
                                                    Last Documented On   
1 12:31PM ; [facility]  
  
  
  
                                                    Z68.42 - Body mass index [BM  
I]   
45.0-49.9, adult                        Medical Established Patient with   
Doreen Deborah CNP                        2021  
  
  
  
                                                    Last Documented On   
1 12:31PM ; [facility]  
  
  
  
                                        Post-traumatic stress disorder  Establ  
ished Patient with Leonie Short   
LISWS                                   06/10/2021  
  
  
  
                                                    Last Documented On 06/10/202  
1 10:05PM ; [facility]  
  
  
  
                                Morbid obesity  Medical Established Patient with  
 Doreen Deborah CNP 06/10/2021  
  
  
  
                                                    Last Documented On 06/10/202  
1 4:45PM ; [facility]  
  
  
  
                                                    Z68.42 - Body mass index [BM  
I]   
45.0-49.9, adult                        Medical Established Patient with   
Doreen Deborah CNP                        06/10/2021  
  
  
  
                                                    Last Documented On 06/10/202  
1 4:45PM ; [facility]  
  
  
  
                                        Post-traumatic stress disorder  Establ  
ished Patient with Leonie Short   
LISWS                                   2021  
  
  
  
                                                    Last Documented On   
1 11:59AM ; [facility]  
  
  
  
                                                    Assessment of visit for: bhargavi myles for   
human immunodeficiency virus            Medical New Patient with Doreen Cabrera CNP                              2021  
  
  
  
                                                    Last Documented On   
1 4:06PM ; [facility]  
  
  
  
                                                    Diabetes Risk Test Score was  
 one score   
2021                               Medical New Patient with Doreen   
Deborah CNP                              2021  
  
  
  
                                                    Last Documented On   
1 4:06PM ; [facility]  
  
  
  
                                Hypertension    Medical New Patient with Doreen esposito CNP 2021  
  
  
  
                                                    Last Documented On   
1 4:06PM ; [facility]  
  
  
  
                                Morbid obesity  Medical New Patient with Doreen esposito CNP 2021  
  
  
  
                                                    Last Documented On   
1 4:06PM ; [facility]  
  
  
  
                                Post-traumatic stress disorder Medical New Patie  
nt with Doreen Cabrera CNP   
2021  
  
  
  
                                                    Last Documented On   
1 4:06PM ; [facility]  
  
  
  
                                                    Z68.42 - Body mass index [BM  
I]   
45.0-49.9, adult                        Medical New Patient with Doreen Cabrera CNP                              2021  
  
  
  
                                                    Last Documented On   
1 4:06PM ; [facility]  
Work Phone: [phone]- Progress note*   
  
Progress note  
  
  
  
                                Date            Encounter       Last Documented   
by  
   
                                2024       Established Patient Last docu  
mented on 2024; 4:15 PM, Radha MONTERO; [facility]  
  
  
  
                                                      
  
  
** Active Problems & Conditions **  
- F90.9 - Attention-deficit Hyperactivity Disorder  
- F31.31 - Bipolar I Disorder, Most Recent Episode, Depressed Mild  
- E11.9 - Diabetes Mellitus Type 2 Without Complication  
- N94.6 - Dysmenorrhea  
- F43.10 - Post-traumatic Stress Disorder  
  
** Subjective **  
[facility] met with patient for mood and medications.  
PHQ9 score 18 ESTHELA score 21  
Patient reports that her depression and anxiety are not good at the moment.  
She is not sleeping well and does not feel that the trazedone is helping. She 
would   
like to start seroquel.  
Patient would like to go back to counseling and was provided resources.  
Pt is reporting that she is haivng nightmares and they are from her past.  
Discussed EMDR therapy for Pt to address her PTSD.  
Encouraged patient to engage in more physical activity to help with sleep. She   
reports that she just not tired to be able to sleep but feels tired. . Pt would 
like   
to walk around a track but is fearful that she will see her father.  
Pt was present with her mother who agreed to go with her walking.  
Discussed wtih patient the benefits of seeing a psychiatrist for evaluation for 
ADHD.  
  
** Chief Complaint **  
The Chief Complaint is: Follow up for mood and medications.  
  
** History of Present Illness **  
Linnette Beckham is a 24 year old female.  
- Appetite not normal - Irritability  
- Anxiety - Depression severe - Sleep disturbances Will be starting seroquel - 
Energy   
level is good - Easily distracted  
  
** Current Medication **  
- Alcohol Pads 70% use to cleanse skin prior to checking blood sugars, 90 days, 
3   
refills  
- ARIPiprazole 5 MG Oral Tablet take 1 tablet by mouth once daily, 30 days, 5 
refills  
- Atorvastatin Calcium 20 MG Oral Tablet take 1 tablet by mouth once daily at   
bedtime, 30 days, 5 refills  
- Blood Glucose System Sriram Kit use to check sugars once daily (use what is 
covered),   
30 days, 0 refills  
- busPIRone HCl 10 MG Oral Tablet take 1 tablet by mouth twice daily (d/c 5 mg 
rx),   
30 days, 5 refills  
- CareSens Lancets Miscellaneous use to check sugars once daily (dispense what 
is   
covered), 90 days, 3 refills  
- Colace 100 MG Oral Capsule take 1 capsule by mouth once daily at bedtime as 
needed   
for constipation, 30 days, 5 refills  
- [employer] Iron 325 (65 Fe) MG Oral Tablet take 1 tablet by mouth once daily, 30 
days, 5   
refills  
- Intuniv 1 MG Oral Tablet Extended Release 24 Hour take 1 tablet by mouth once 
daily   
at bedtime, 30 days, 5 refills  
- Intuniv 1 MG Oral Tablet Extended Release 24 Hour take 1 tablet by mouth once 
daily   
at bedtime, 30 days, 5 refills  
- Januvia 50 MG Oral Tablet take 1 tablet by mouth once daily, 30 days, 5 
refills  
- lamoTRIgine 100 MG Oral Tablet take 1 tablet by mouth once daily, 30 days, 5   
refills  
- lamoTRIgine 100 MG Oral Tablet take 1 tablet by mouth once daily, 30 days, 5   
refills  
- Lisinopril 5 MG Oral Tablet take 1 tablet by mouth once daily, 30 days, 5 
refills  
- MiraLax 17 GM/SCOOP Oral Powder mix 1 scoop with 8 ounces once daily, 30 days,
 2   
refills  
- Narcan 4 MG/0.1ML Nasal Liquid spray in nostril for symptoms of overdose , may
   
repeat in 2 minutes if symptoms persist, 1 days, 0 refills  
- OneTouch Ultra In Vitro Strip USE ONE TEST STRIP TO CHECK BLOOD SUGARS ONCE 
DAILY,   
90 days, 3 refills  
- Prazosin HCl 1 MG Oral Capsule take 1 capsule by mouth twice daily, 30 days, 5
   
refills  
- Prazosin HCl 1 MG Oral Capsule take 1 capsule by mouth twice daily, 30 days, 5
   
refills  
- SEROquel 50 MG Oral Tablet take 1 tablet by mouth once daily at bedtime (d/c   
trazodone), 30 days, 1 refills  
- Sertraline HCl 50 MG Oral Tablet take 1 tablet by mouth once daily, 30 days, 5
   
refills  
- Sertraline HCl 50 MG Oral Tablet take 1 tablet by mouth once daily, 30 days, 5
   
refills  
- Slynd 4 MG Oral Tablet take 1 tablet by mouth at the same time everyday to 
help   
regulate your period, 28 days, 2 refills  
  
** Past Medical/Surgical History **  
Reported:  
No Safety Measures. Has sex without a condom.  
Medical: No previous hospitalizations. Chronic illness and Sexually transmitted   
infection Partners sexually transmitted infection status known.  
Immunization History: Recent immunization for flu.  
Exposure: Exposure to COVID-19.  
Pregnancy: Previously pregnant 1 time(s) and para having 0 live birth(s). Not   
planning a pregnancy in the next year.  
Diagnoses:  
Polycystic Ovarian Syndrome (PCOS).  
Migraine headache.  
Psychiatric disorders biopolar disorder  
Anxiety disorder  
POTS.  
Procedural:  
- Insertion of ear pressure equalization tubes in both ears  
Surgical:  
- Tonsillectomy  
- Tonsillectomy with adenoidectomy  
  
** Social History **  
Environmental Exposure: Secondhand cigarette smoke exposure.  
Personal: Recent emotional stress.  
Tobacco use: Using electronic cigarettes/vaping.  
Alcohol: Not using alcohol.  
Drug Use: Not using drugs.  
Housing And Economic Circumstances: Lives with parents.  
Sexual: Sexual orientation Lesbian or David and gender identity Other.  
  
** Allergies **  
- Abilify Reaction: groggy  
- Augmentin Reaction: Shock  
- Metformin Reaction: Nausea, Vomiting  
- NO KNOWN ENVIRONMENTAL ALLERGIES  
- NO KNOWN FOOD ALLERGIES  
- Sertraline Reaction: slow/groggy  
- Trazodone Hydrochloride Reaction: Panic attack  
  
** Family History **  
Paternal:  
Systemic hypertension  
Oncologic disorder  
Maternal:  
Systemic hypertension  
Epilepsy and recurrent seizures  
Psychiatric disorders  
Oncologic disorder  
Fraternal:  
Psychiatric disorders  
  
** Physical Findings **  
General Appearance:  
- Normal Appearance.  
Neurological:  
- Cognitive Functions was Normal. - Oriented to time, place, and person. - No   
hallucinations. - Judgement was not impaired.  
Speech: - Is Normal. - No articulation abnormalities.  
Psychiatric:  
- Mood is Euthymic. - Attitude Open.  
Appearance: - Normal.  
Demonstrated Behavior: - Motor Activity Normal Activity. - Eye Contact 
Appropriate.  
Affect: - Congruent with the mood.  
Thought Processes: - Not impaired.  
Thought Content: - Revealed no impairment. - Insight was intact. - No delusions.
 - No   
suicidal ideation. - No Passive thoughts of Death. - No suicidal plans. - No 
suicidal   
intent. - No homicidal ideations. - No homicidal plans. - No homicidal intent.  
Past Medical:  
- No repetitive self injurious behavior.  
  
** Assessment **  
- Attention-deficit hyperactivity disorder [F90.9 - Attention-deficit 
hyperactivity   
disorder, unspecified type]  
- Nicotine dependence [F17.200 - Nicotine dependence, unspecified, 
uncomplicated]  
- Bipolar I disorder, most recent episode, depressed - mild [F31.31 - Bipolar   
disorder, current episode depressed, mild]  
- Post-traumatic stress disorder [F43.10 - Post-traumatic stress disorder,   
unspecified]  
  
** Therapy **  
- Developmental/Behavioral Screening & Testing - PHQ9 and 
Developmental/Behavioral   
Screening & Testing - GAD7.  
- Brief solution-focused.  
- Adherent with medications.  
-  Visit 30 Minutes.  
- Referral to mental health team.  
- Plan - Begin taking the seroquel at bedtime and Collaborated with patient and   
provider:  
  
** Counseling/Education **  
*BHP offered active and supportive listening, normalized emotions and feelings, 
and   
processed  
current stressors.  
*Discussed engageing in counseling and looking into EMDR to address PTSD and   
increased nightmares.  
  
** Plan **  
*Continue to take medication(s) as prescribed.  
*[facility] to follow-up with patient at next visit as scheduled.  
*Patient to follow up as needed/scheduled.  
  
** Care Team **  
- Doreen Cabrera CNP  
  
** Health Reminders **  
- Assess Tobacco Use satisfied 2024.  
- ESTHELA-2 satisfied 2024.  
- PHQ9 / PHQA satisfied 2024.  
  
** User Defined 1 **  
ESTHELA-2 score was six 2024, ESTHELA-7 score was 21 [ESTHELA-7] Feeling nervous, 
anxious or   
on edge? + 3 pt : Nearly every day, [ESTHELA-7] Feeling nervous, anxious or on edge?
+ 3   
pt : Nearly every day, [ESTHELA-7] Not being able to stop or control worrying? + 3 
pt :   
Nearly every day, [ESTHELA-7] Not being able to stop or control worrying? + 3 pt : 
Nearly   
every day, [ESTHELA-7] Worrying too much about different things? + 3 pt : Nearly 
every   
day, [ESTHELA-7] Trouble relaxing? + 3 pt : Nearly every day, [ESTHELA-7] Being so 
restless   
that it's hard to sit still? + 3 pt : Nearly every day, [ESTHELA-7] Becoming easily   
annoyed or irritable? + 3 pt : Nearly every day, [ESTHELA-7] Feeling afraid as if   
something awful might happen? + 3 pt : Nearly every day, Patient Health 
Questionnaire   
9-Item total score was 18 2024 If you checked off problems, how difficult 
is it   
for you to do your work? + : Very difficult, [PHQ-9-1] Little interest or 
pleasure in   
doing things? + 3 pt : Nearly every day, [PHQ-9-2] Feeling down, depressed, or   
hopeless? + 3 pt : Nearly every day, [PHQ-9-3] Trouble falling or staying asleep
 or   
sleeping too much? + 3 pt : Nearly every day, [PHQ-9-4] Feeling tired or having   
little energy? + 3 pt : Nearly every day, [PHQ-9-5] Poor appetite or overeating?
 + 3   
pt : Nearly every day, [PHQ-9-6] Feeling bad about yourself-or that you are a 
failure   
+ 0 pt : Not at all, [PHQ-9-7] Trouble concentrating on things such as reading 
the   
newspaper + 3 pt : Nearly every day, [PHQ-9-8] Moving or speaking so slowly that
   
other people have noticed. + 0 pt : Not at all, [PHQ-9-9] Thoughts that you 
would be   
better off dead or hurting yourself? + 0 pt : Not at all, and ESTHELA If you checked
 off   
problems, how difficult is it for you to do your work, take care of things, or 
get   
along? Very difficult.  
  
  
[facility] [place] [facility]\""09- Progress note*   
  
Progress note  
  
  
  
                                Date            Encounter       Last Documented   
by  
   
                                2024      Medical Established Patient Last  
 documented on 10/04/2024; 1:56 PM,   
Doreen Cabrera CNP; [facility]  
  
  
  
                                                      
  
  
** Active Problems & Conditions **  
- F90.9 - Attention-deficit Hyperactivity Disorder  
- F31.31 - Bipolar I Disorder, Most Recent Episode, Depressed Mild  
- E11.9 - Diabetes Mellitus Type 2 Without Complication  
- N94.6 - Dysmenorrhea  
- F43.10 - Post-traumatic Stress Disorder  
  
** Chief Complaint **  
The Chief Complaint is: Patient reports her mental health is a problem. Patient 
isn't   
sure the zoloft is working for her. Patient said she is feeling very down. 
Patient is   
not sleeping, but has not picked up seroquel. Patient has been still taking the   
trazodone.  
  
** Referred Here **  
Not referred by urgent care clinic and not the emergency room. No prior 
encounters.  
- Data to be reviewed: no clinical lab tests  
  
** History of Present Illness **  
Linnette Beckham is a 24 year old female.  
- Allergy list reviewed - Reviewed Medications - Medication list reviewed  
- Date of last menstruation 2024 - Pregnancy test - would not like a 
pregnancy   
test  
Presents for med follow up , did not know seroquel was ready at the pharmacy so 
hasnt   
been taking . we do not have any hpv vaccines here today , needs 2nd dose. is 
going   
thru a vape about every 3 days , wants to quit but aware it would be better to 
wait   
until mental health is better , advised on the harm of nicotine / vapes  
  
** Current Medication **  
- Alcohol Pads 70% use to cleanse skin prior to checking blood sugars, 90 days, 
3   
refills  
- ARIPiprazole 5 MG Oral Tablet take 1 tablet by mouth once daily, 30 days, 5 
refills  
- Atorvastatin Calcium 20 MG Oral Tablet take 1 tablet by mouth once daily at   
bedtime, 30 days, 5 refills  
- Blood Glucose System Sriram Kit use to check sugars once daily (use what is 
covered),   
30 days, 0 refills  
- busPIRone HCl 10 MG Oral Tablet take 1 tablet by mouth twice daily (d/c 5 mg 
rx),   
30 days, 5 refills  
- CareSens Lancets Miscellaneous use to check sugars once daily (dispense what 
is   
covered), 90 days, 3 refills  
- Colace 100 MG Oral Capsule take 1 capsule by mouth once daily at bedtime as 
needed   
for constipation, 30 days, 5 refills  
- [employer] Iron 325 (65 Fe) MG Oral Tablet take 1 tablet by mouth once daily, 30 
days, 5   
refills  
- Intuniv 1 MG Oral Tablet Extended Release 24 Hour take 1 tablet by mouth once 
daily   
at bedtime, 30 days, 5 refills  
- Januvia 50 MG Oral Tablet take 1 tablet by mouth once daily, 30 days, 5 
refills  
- lamoTRIgine 100 MG Oral Tablet take 1 tablet by mouth once daily, 30 days, 5   
refills  
- Lisinopril 5 MG Oral Tablet take 1 tablet by mouth once daily, 30 days, 5 
refills  
- MiraLax 17 GM/SCOOP Oral Powder mix 1 scoop with 8 ounces once daily, 30 days,
 2   
refills  
- Narcan 4 MG/0.1ML Nasal Liquid spray in nostril for symptoms of overdose , may
   
repeat in 2 minutes if symptoms persist, 1 days, 0 refills  
- OneTouch Ultra In Vitro Strip USE ONE TEST STRIP TO CHECK BLOOD SUGARS ONCE 
DAILY,   
90 days, 3 refills  
- Prazosin HCl 1 MG Oral Capsule take 1 capsule by mouth twice daily, 30 days, 5
   
refills  
- SEROquel 50 MG Oral Tablet take 1 tablet by mouth once daily at bedtime (d/c   
trazodone), 30 days, 1 refills  
- Sertraline HCl 50 MG Oral Tablet take 1 tablet by mouth once daily, 30 days, 5
   
refills  
- Slynd 4 MG Oral Tablet take 1 tablet by mouth at the same time everyday to 
help   
regulate your period, 28 days, 2 refills  
  
** Past Medical/Surgical History **  
Reported:  
Has sex without a condom.  
Medical: No previous hospitalizations. Chronic illness and Sexually transmitted   
infection Partners sexually transmitted infection status known.  
Exposure: Exposure to COVID-19.  
Pregnancy: Previously pregnant 1 time(s) and para having 0 live birth(s). Not   
planning a pregnancy in the next year.  
Legal Documents: Consent form on file for procedure.  
Diagnoses:  
Polycystic Ovarian Syndrome (PCOS).  
Migraine headache.  
Psychiatric disorders biopolar disorder  
Anxiety disorder  
POTS.  
Procedural:  
- Insertion of ear pressure equalization tubes in both ears  
Surgical:  
- Tonsillectomy  
- Tonsillectomy with adenoidectomy  
  
** Social History **  
Environmental Exposure: Secondhand cigarette smoke exposure.  
Tobacco use: Using electronic cigarettes/vaping.  
Alcohol: Not using alcohol.  
Drug Use: Not using drugs denied by patient.  
Sexual: Sexually active, sexual orientation Lesbian or David, gender identity 
Other,   
and birth control is being practiced Pills.  
  
** Allergies **  
- Abilify Reaction: groggy  
- Augmentin Reaction: Shock  
- Metformin Reaction: Nausea, Vomiting  
- NO KNOWN ENVIRONMENTAL ALLERGIES  
- NO KNOWN FOOD ALLERGIES  
- Sertraline Reaction: slow/groggy  
- Trazodone Hydrochloride Reaction: Panic attack  
  
** Family History **  
Paternal:  
Systemic hypertension  
Oncologic disorder  
Maternal:  
Systemic hypertension  
Epilepsy and recurrent seizures  
Psychiatric disorders  
Oncologic disorder  
Fraternal:  
Psychiatric disorders  
  
** Review Of Systems **  
Head: No head symptoms.  
Neck: No neck symptoms.  
Eyes: No eye symptoms.  
Otolaryngeal: No ear symptoms, no nasal symptoms, no nose and sinus finding, no   
throat symptoms, no oral cavity symptoms, and no jaw symptoms.  
Breasts: No breast symptoms.  
Cardiovascular: No cardiovascular symptoms and no chest pain or discomfort.  
Pulmonary: No pulmonary symptoms.  
Gastrointestinal: No gastrointestinal symptoms.  
Genitourinary: No genitourinary symptoms.  
Musculoskeletal: No musculoskeletal symptoms.  
Neurological: No neurological symptoms.  
Psychological: No psychological symptoms.  
Skin: No skin symptoms.  
Cardiovascular: Edema not present.  
  
** Physical Findings **  
- Vitals taken 2024 04:27 pm  
BP-Sitting L135/86 mmHg  
BP Cuff SizeLarge  
Pulse Rate-Xbxutvo502 bpm  
Respiration Rate18 per min  
Temp-Oral97.6 F  
Eglily77 in  
Viryus721 lbs  
Body Mass Index57.2 kg/m2  
Body Surface Area2.4 m2  
Oxygen Jzjenbhdyo05 %  
  
Vital Signs:  
- Systolic blood pressure 130 - 139 mmHg.  
- Diastolic blood pressure 80-89 mmHg.  
General Appearance:  
- Awake. - Alert. - Well developed. - Well nourished. - In no acute distress.  
Lungs:  
- Respiration rhythm and depth was normal. - Clear to auscultation.  
Cardiovascular:  
Heart Rate And Rhythm: - Normal.  
Heart Sounds: - Normal.  
Murmurs: - No murmurs were heard.  
Musculoskeletal System:  
General/bilateral: - Normal movement of all extremities.  
Foot:  
General/bilateral: - Normal 10-g monofilament exam of right foot. - Normal 10-g   
monofilament exam of left foot.  
Skin:  
- General appearance was normal.  
Physician's Services:  
- Diabetic Foot Exam Abnormal  
  
** Tests **  
Blood Analysis:  
Blood Endocrine Laboratory Tests: Value  
Blood glucose level by fingerstick Non-fasting 122 mg/dl  
Laboratory-based Chemistry:  
Other Laboratory Tests:  
Screening for sexually transmitted infections was not performed.  
Imaging:  
Ophthalmoscopy:  
[employer] completed Both eyes (OU) and with physician / healthcare professional   
interpretation and report.  
  
** Assessment **  
- Z68.43 - Body mass index [BMI] 50.0-59.9, adult  
- Z23 - Encounter for immunization  
- K02.9 - Dental caries, unspecified  
- E11.9 - Type 2 diabetes mellitus without complications  
- F31.31 - Bipolar disorder, current episode depressed, mild  
  
** Previous Tests **  
Laboratory Studies:  
Renal Function:  
Glomerular filtration rate 2024 >60.  
  
** Therapy **  
- Patient has agreed to receive flu vaccine today.  
- Silver diamine fluoride 38% application by physician or other QHP 10 Teeth.  
- Immunization administration, by injection, one vaccine.  
  
** Vaccinations **  
- Fluarix 0.5mL for 6 months and older Dose #2 Status: Administered Date: 
2024  
  
- Received dose of Fluarix 0.5mL (6 months and up)  
  
** Counseling/Education **  
- Wishing to stop using electronic cigarettes/vaping  
- Discussed nutritional needs teach healthy choices including fruits and 
vegetables  
- Patient education about a proper diet  
- Discussed concerns about exercise: promote physical activity  
  
** Plan **  
StartCited- Attention-deficit hyperactivity disorder, unspecified type  
Intuniv 1 MG tablet take 1 tablet by mouth once daily at bedtime, 30 days, 5 
refills  
EndCited  
StartCited- Other  
Follow-up Appointment  
1 month med check  
lamoTRIgine 100 MG tablet take 1 tablet by mouth once daily, 30 days, 5 refills  
Prazosin HCl 1 MG capsule take 1 capsule by mouth twice daily, 30 days, 5 
refills  
Sertraline HCl 50 MG tablet take 1 tablet by mouth once daily, 30 days, 5 
refills  
EndCited  
** Care Team **  
- Doreen Cabrera CNP  
  
** Health Reminders **  
- Assess BMI satisfied 2024.  
- Assess Tobacco Use satisfied 2024.  
- Eye Exam satisfied 2024.  
- Follow Up Plan BMI Management satisfied 2024.  
- Foot Exam satisfied 2024.  
  
** User Defined 1 **  
Not planning a pregnancy in the next year.  
  
** Bottom of Document **  
Illustration  
  
  
[facility]09- Progress note*   
  
Progress note  
  
  
  
                                Date            Encounter       Last Documented   
by  
   
                                2024      Medical Established Patient Last  
 documented on 10/09/2024; 2:09 PM,   
Doreen Cabrera CNP; [facility]  
  
  
  
                                                      
  
  
** Active Problems & Conditions **  
- F90.9 - Attention-deficit Hyperactivity Disorder  
- F31.31 - Bipolar I Disorder, Most Recent Episode, Depressed Mild  
- E11.9 - Diabetes Mellitus Type 2 Without Complication  
- N94.6 - Dysmenorrhea  
- F43.10 - Post-traumatic Stress Disorder  
  
** Chief Complaint **  
The Chief Complaint is: Patient reports her mental health is a problem. Patient 
isn't   
sure the zoloft is working for her. Patient said she is feeling very down. 
Patient is   
not sleeping, but has not picked up seroquel. Patient has been still taking the   
trazodone.  
  
** Referred Here **  
Not referred by urgent care clinic and not the emergency room. No prior 
encounters.  
- Data to be reviewed: no clinical lab tests  
  
** History of Present Illness **  
Linnette Beckham is a 24 year old female.  
- Allergy list reviewed - Reviewed Medications - Medication list reviewed  
- Date of last menstruation 2024 - Pregnancy test - would not like a 
pregnancy   
test  
Presents for med follow up , did not know seroquel was ready at the pharmacy so 
hasnt   
been taking . we do not have any hpv vaccines here today , needs 2nd dose. is 
going   
thru a vape about every 3 days , wants to quit but aware it would be better to 
wait   
until mental health is better , advised on the harm of nicotine / vapes  
  
** Current Medication **  
- Alcohol Pads 70% use to cleanse skin prior to checking blood sugars, 90 days, 
3   
refills  
- ARIPiprazole 5 MG Oral Tablet take 1 tablet by mouth once daily, 30 days, 5 
refills  
- Atorvastatin Calcium 20 MG Oral Tablet take 1 tablet by mouth once daily at   
bedtime, 30 days, 5 refills  
- Blood Glucose System Sriram Kit use to check sugars once daily (use what is 
covered),   
30 days, 0 refills  
- busPIRone HCl 10 MG Oral Tablet take 1 tablet by mouth twice daily (d/c 5 mg 
rx),   
30 days, 5 refills  
- CareSens Lancets Miscellaneous use to check sugars once daily (dispense what 
is   
covered), 90 days, 3 refills  
- Colace 100 MG Oral Capsule take 1 capsule by mouth once daily at bedtime as 
needed   
for constipation, 30 days, 5 refills  
- [facility] Iron 325 (65 Fe) MG Oral Tablet take 1 tablet by mouth once daily, 30 
days, 5   
refills  
- Intuniv 1 MG Oral Tablet Extended Release 24 Hour take 1 tablet by mouth once 
daily   
at bedtime, 30 days, 5 refills  
- Januvia 50 MG Oral Tablet take 1 tablet by mouth once daily, 30 days, 5 
refills  
- lamoTRIgine 100 MG Oral Tablet take 1 tablet by mouth once daily, 30 days, 5   
refills  
- Lisinopril 5 MG Oral Tablet take 1 tablet by mouth once daily, 30 days, 5 
refills  
- MiraLax 17 GM/SCOOP Oral Powder mix 1 scoop with 8 ounces once daily, 30 days,
 2   
refills  
- Narcan 4 MG/0.1ML Nasal Liquid spray in nostril for symptoms of overdose , may
   
repeat in 2 minutes if symptoms persist, 1 days, 0 refills  
- OneTouch Ultra In Vitro Strip USE ONE TEST STRIP TO CHECK BLOOD SUGARS ONCE 
DAILY,   
90 days, 3 refills  
- Prazosin HCl 1 MG Oral Capsule take 1 capsule by mouth twice daily, 30 days, 5
   
refills  
- SEROquel 50 MG Oral Tablet take 1 tablet by mouth once daily at bedtime (d/c   
trazodone), 30 days, 1 refills  
- Sertraline HCl 50 MG Oral Tablet take 1 tablet by mouth once daily, 30 days, 5
   
refills  
- Slynd 4 MG Oral Tablet take 1 tablet by mouth at the same time everyday to 
help   
regulate your period, 28 days, 2 refills  
  
** Past Medical/Surgical History **  
Reported:  
Has sex without a condom.  
Medical: No previous hospitalizations. Chronic illness and Sexually transmitted   
infection Partners sexually transmitted infection status known.  
Exposure: Exposure to COVID-19.  
Pregnancy: Previously pregnant 1 time(s) and para having 0 live birth(s). Not   
planning a pregnancy in the next year.  
Legal Documents: Consent form on file for procedure.  
Diagnoses:  
Polycystic Ovarian Syndrome (PCOS).  
Migraine headache.  
Psychiatric disorders biopolar disorder  
Anxiety disorder  
POTS.  
Procedural:  
- Insertion of ear pressure equalization tubes in both ears  
Surgical:  
- Tonsillectomy  
- Tonsillectomy with adenoidectomy  
  
** Social History **  
Environmental Exposure: Secondhand cigarette smoke exposure.  
Tobacco use: Using electronic cigarettes/vaping.  
Alcohol: Not using alcohol.  
Drug Use: Not using drugs denied by patient.  
Sexual: Sexually active, sexual orientation Lesbian or David, gender identity 
Other,   
and birth control is being practiced Pills.  
  
** Allergies **  
- Abilify Reaction: groggy  
- Augmentin Reaction: Shock  
- Metformin Reaction: Nausea, Vomiting  
- NO KNOWN ENVIRONMENTAL ALLERGIES  
- NO KNOWN FOOD ALLERGIES  
- Sertraline Reaction: slow/groggy  
- Trazodone Hydrochloride Reaction: Panic attack  
  
** Family History **  
Paternal:  
Systemic hypertension  
Oncologic disorder  
Maternal:  
Systemic hypertension  
Epilepsy and recurrent seizures  
Psychiatric disorders  
Oncologic disorder  
Fraternal:  
Psychiatric disorders  
  
** Review Of Systems **  
Head: No head symptoms.  
Neck: No neck symptoms.  
Eyes: No eye symptoms.  
Otolaryngeal: No ear symptoms, no nasal symptoms, no nose and sinus finding, no   
throat symptoms, no oral cavity symptoms, and no jaw symptoms.  
Breasts: No breast symptoms.  
Cardiovascular: No cardiovascular symptoms and no chest pain or discomfort.  
Pulmonary: No pulmonary symptoms.  
Gastrointestinal: No gastrointestinal symptoms.  
Genitourinary: No genitourinary symptoms.  
Musculoskeletal: No musculoskeletal symptoms.  
Neurological: No neurological symptoms.  
Psychological: No psychological symptoms.  
Skin: No skin symptoms.  
Cardiovascular: Edema not present.  
  
** Physical Findings **  
- Vitals taken 2024 04:27 pm  
BP-Sitting L135/86 mmHg  
BP Cuff SizeLarge  
Pulse Rate-Vzvoixh801 bpm  
Respiration Rate18 per min  
Temp-Oral97.6 F  
Zccunq87 in  
Ebhobm123 lbs  
Body Mass Index57.2 kg/m2  
Body Surface Area2.4 m2  
Oxygen Jlbhxrmoqn07 %  
  
Vital Signs:  
- Systolic blood pressure 130 - 139 mmHg.  
- Diastolic blood pressure 80-89 mmHg.  
General Appearance:  
- Awake. - Alert. - Well developed. - Well nourished. - In no acute distress.  
Lungs:  
- Respiration rhythm and depth was normal. - Clear to auscultation.  
Cardiovascular:  
Heart Rate And Rhythm: - Normal.  
Heart Sounds: - Normal.  
Murmurs: - No murmurs were heard.  
Musculoskeletal System:  
General/bilateral: - Normal movement of all extremities.  
Foot:  
General/bilateral: - Normal 10-g monofilament exam of right foot. - Normal 10-g   
monofilament exam of left foot.  
Skin:  
- General appearance was normal.  
Physician's Services:  
- Diabetic Foot Exam Abnormal  
  
** Tests **  
Blood Analysis:  
Blood Endocrine Laboratory Tests: Value  
Blood glucose level by fingerstick Non-fasting 122 mg/dl  
Laboratory-based Chemistry:  
Other Laboratory Tests:  
Screening for sexually transmitted infections was not performed.  
Imaging:  
Ophthalmoscopy:  
No apparent diabetic retinopathy.  
[employer] completed Both eyes (OU) and with physician / healthcare professional   
interpretation and report.  
  
** Assessment **  
- Z68.43 - Body mass index [BMI] 50.0-59.9, adult  
- Z23 - Encounter for immunization  
- K02.9 - Dental caries, unspecified  
- E11.9 - Type 2 diabetes mellitus without complications  
- F31.31 - Bipolar disorder, current episode depressed, mild  
  
** Previous Tests **  
Laboratory Studies:  
Renal Function:  
Glomerular filtration rate 2024 >60.  
  
** Therapy **  
- Patient has agreed to receive flu vaccine today.  
- Silver diamine fluoride 38% application by physician or other QHP 10 Teeth.  
- Immunization administration, by injection, one vaccine.  
  
** Vaccinations **  
- Fluarix 0.5mL for 6 months and older Dose #2 Status: Administered Date: 
2024  
  
- Received dose of Fluarix 0.5mL (6 months and up)  
  
** Counseling/Education **  
- Wishing to stop using electronic cigarettes/vaping  
- Discussed nutritional needs teach healthy choices including fruits and 
vegetables  
- Patient education about a proper diet  
- Discussed concerns about exercise: promote physical activity  
  
** Plan **  
StartCited- Attention-deficit hyperactivity disorder, unspecified type  
Intuniv 1 MG tablet take 1 tablet by mouth once daily at bedtime, 30 days, 5 
refills  
EndCited  
StartCited- Other  
Follow-up Appointment  
1 month med check  
lamoTRIgine 100 MG tablet take 1 tablet by mouth once daily, 30 days, 5 refills  
Prazosin HCl 1 MG capsule take 1 capsule by mouth twice daily, 30 days, 5 
refills  
Sertraline HCl 50 MG tablet take 1 tablet by mouth once daily, 30 days, 5 
refills  
EndCited  
** Care Team **  
- Doreen Cabrera CNP  
  
** Health Reminders **  
- Assess BMI satisfied 2024.  
- Assess Tobacco Use satisfied 2024.  
- Eye Exam satisfied 10/09/2024.  
- Follow Up Plan BMI Management satisfied 2024.  
- Foot Exam satisfied 2024.  
  
** User Defined 1 **  
Not planning a pregnancy in the next year.  
  
** Bottom of Document **  
Illustration  
  
  
[facility]09- Reason for referral (narrative)*   
  
                          Date         Encounter Description Provider     Reason  
 for Referral  
   
                          24      Established Patient Radha Hector LSW R  
eferral To Mental Health Team  
   
                          22      Established Patient Haylie Jeff MOYA  
CC-S Referral To Mental Health  
 Team  
   
                          21     Medical New Patient Doreen Cabrera CNP Refe  
rral To Mental Health Team  
  
  
[facility]  
Work Phone: [phone]- Evaluation note  
  
Includes: Assessments for all patient encounters  
  
  
  
                                Findings        Encounter       Date  
   
                                                    Attention-deficit hyperactiv  
ity   
disorder                                 Established Patient with Leonie   
Short LISWS                             2024  
  
  
  
                                                    Last Documented On   
4 6:28PM ; [facility]  
  
  
  
                                                    Bipolar I disorder, most rec  
ent   
episode, depressed - mild                Established Patient with Leonie   
Short LISWS                             2024  
  
  
  
                                                    Last Documented On   
4 6:28PM ; [facility]  
  
  
  
                                        Post-traumatic stress disorder  Establ  
ished Patient with Leonie Short   
LISWS                                   2024  
  
  
  
                                                    Last Documented On   
4 6:28PM ; [facility]  
  
  
  
                                                    [E11.9 - Type 2 diabetes lobito  
litus   
without complications] type 2 diabetes   
mellitus                                Medical Established Patient with   
Doreen Cabrera CNP                        2024  
  
  
  
                                                    Last Documented On   
4 7:36PM ; [facility]  
  
  
  
                                                    [F41.1 - Generalized anxiety  
   
disorder] generalized anxiety   
disorder                                Medical Established Patient with   
Doreen Cabrera CNP                        2024  
  
  
  
                                                    Last Documented On   
4 7:36PM ; [facility]  
  
  
  
                                                    [I10 - Essential (primary)   
hypertension] essential hypertension    Medical Established Patient with   
Doreen Cabrera CNP                        2024  
  
  
  
                                                    Last Documented On   
4 7:36PM ; [facility]  
  
  
  
                                                    [N94.6 - Dysmenorrhea, unspe  
cified]   
dysmenorrhea                            Medical Established Patient with   
Doreen Cabrera CNP                        2024  
  
  
  
                                                    Last Documented On   
4 7:36PM ; [facility]  
  
  
  
                                                    [Z68.43 - Body mass index [B  
MI]   
50.0-59.9, adult] assessment of body   
mass index                              Medical Established Patient with   
Doreen Cabrera CNP                        2024  
  
  
  
                                                    Last Documented On   
4 7:36PM ; [facility]  
  
  
  
                                        Encounter for Immunization Medical Estab  
lished Patient with Doreen Cabrera   
CNP                                     2024  
  
  
  
                                                    Last Documented On   
4 7:36PM ; [facility]  
  
  
  
                                        Venipuncture was performed Medical Estab  
lished Patient with Doreen Cabrera   
CNP                                     2024  
  
  
  
                                                    Last Documented On   
4 7:36PM ; [facility]  
  
  
  
                                        Attention-deficit hyperactivity disorder  
  Established Patient with   
Leonie Short LISWS                     2024  
  
  
  
                                                    Last Documented On   
4 10:10AM ; [facility]  
  
  
  
                                                    Bipolar I disorder, most rec  
ent   
episode, depressed - mild                Established Patient with Leonie   
Short LISWS                             2024  
  
  
  
                                                    Last Documented On   
4 10:10AM ; [facility]  
  
  
  
                                        Post-traumatic stress disorder  Establ  
ished Patient with Leonie Short   
LISWS                                   2024  
  
  
  
                                                    Last Documented On   
4 10:10AM ; [facility]  
  
  
  
                                        [D64.9 - Anemia, unspecified] anemia Med  
ical Established Patient with   
Doreen Cabrera CNP                        2024  
  
  
  
                                                    Last Documented On   
4 6:51PM ; [facility]  
  
  
  
                                                    [Z68.43 - Body mass index [B  
MI]   
50.0-59.9, adult] assessment of body   
mass index                              Medical Established Patient with   
Doreen Cabrera CNP                        2024  
  
  
  
                                                    Last Documented On   
4 6:51PM ; [facility]  
  
  
  
                                                    Bipolar affective disorder,   
current   
episode depressed, mild                  Established Patient with Leonie   
Short LISWS                             2024  
  
  
  
                                                    Last Documented On   
4 5:16PM ; [facility]  
  
  
  
                                        Post-traumatic stress disorder  Establ  
ished Patient with Leonie Short   
LISWS                                   2024  
  
  
  
                                                    Last Documented On   
4 5:16PM ; [facility]  
  
  
  
                                                    Undifferentiated attention d  
eficit   
disorder                                BH Established Patient with   
Leonie Garrett LISWS                     2024  
  
  
  
                                                    Last Documented On   
4 5:16PM ; [facility]  
  
  
  
                                        Visit for: screening for disorder BH Est  
ablished Patient with Leonie Garrett LISWS                             2024  
  
  
  
                                                    Last Documented On   
4 5:16PM ; [facility]  
  
  
  
                                                    [Z68.43 - Body mass index [B  
MI]   
50.0-59.9, adult] assessment of body   
mass index                              Medical Established Patient with   
Doreen Cabrera CNP                        2024  
  
  
  
                                                    Last Documented On   
4 7:50PM ; [facility]  
  
  
  
                                        Attention-deficit hyperactivity disorder  
 Medical Established Patient with   
Doreen Cabrera CNP                        2024  
  
  
  
                                                    Last Documented On   
4 7:50PM ; [facility]  
  
  
  
                                                    Diabetes Risk Test Score was  
 three   
score 3/27/2024                         Medical Established Patient with Doreen Cabrera CNP                              2024  
  
  
  
                                                    Last Documented On   
4 7:50PM ; [facility]  
  
  
  
                                        Visit for: screening for STD Medical Est  
ablished Patient with Doreen Cabrera   
CNP                                     2024  
  
  
  
                                                    Last Documented On   
4 7:50PM ; [facility]  
  
  
  
                                                    [Z68.32 - Body mass index [B  
MI]   
32.0-32.9, adult] assessment of body   
mass index                              Medical Established Patient with   
Doreen Cabrera CNP                        2023  
  
  
  
                                                    Last Documented On   
3 6:01PM ; [facility]  
  
  
  
                                        Borderline personality disorder Medical   
Established Patient with Doreen Cabrera CNP                              2023  
  
  
  
                                                    Last Documented On   
3 6:01PM ; [facility]  
  
  
  
                                        Screening for diabetes mellitus Medical   
Established Patient with Doreen Cabrera CNP                              2023  
  
  
  
                                                    Last Documented On   
3 6:01PM ; [facility]  
  
  
  
                                        Assessment of body mass index Medical Es  
tablished Patient with Doreen Cabrera CNP                              10/21/2022  
  
  
  
                                                    Last Documented On 10/21/202  
2 2:45PM ; [facility]  
  
  
  
                                                    Bipolar affective disorder,   
current   
episode manic                            Telebehavioral Health with Haylie Aguilar [facility]                        2022  
  
  
  
                                                    Last Documented On   
2 3:22PM ; [facility]  
  
  
  
                                                    Bipolar affective disorder,   
current   
episode manic                            Established Patient with Haylienicanor Aguilar [facility]                        2022  
  
  
  
                                                    Last Documented On   
2 2:28PM ; [facility]  
  
  
  
                                                    Bipolar affective disorder,   
current   
episode depressed, severe with   
psychosis                                Telebehavioral Health with Haylienicanor Aguilar [facility]                        2022  
  
  
  
                                                    Last Documented On   
2 3:29PM ; [facility]  
  
  
  
                                                    Assessment of body mass inde  
x [Body   
mass index [BMI] 50.0-59.9, adult]      [facility] - Established with Julieth   
Aliya CNP                               2022  
  
  
  
                                                    Last Documented On   
2 9:36AM ; [facility]  
  
  
  
                                                    Bipolar I disorder, most rec  
ent   
episode, manic                          Open [facility] - Established with Julieth   
Aliya CNP                               2022  
  
  
  
                                                    Last Documented On   
2 9:36AM ; [facility]  
  
  
  
                                        Post-traumatic stress disorder  Establ  
ished Patient with Haylienicanor Aguilar   
[facility]                                  2022  
  
  
  
                                                    Last Documented On   
2 3:20PM ; [facility]  
  
  
  
                                No cough        Medical Established Patient with  
 Doreen Deborah CNP 2022  
  
  
  
                                                    Last Documented On   
2 4:04PM ; [facility]  
  
  
  
                                                    Z68.43 - Body mass index [BM  
I]   
50.0-59.9, adult                        Medical Established Patient with   
Doreen Deborah CNP                        2022  
  
  
  
                                                    Last Documented On   
2 4:04PM ; [facility]  
  
  
  
                                                    Borderline personality disor  
danny Pt   
reported hx of sx/dx                     Established Patient with Haylienicanor Aguilar [facility]                        2022  
  
  
  
                                                    Last Documented On   
2 4:08PM ; [facility]  
  
  
  
                                                    Assessment of body mass inde  
x [Body   
mass index [BMI] 50.0-59.9, adult]      Open Access - Established with Julieth   
Aliya CNP                               2022  
  
  
  
                                                    Last Documented On   
2 7:41PM ; [facility]  
  
  
  
                                                    Diabetes Risk Test Score was  
 three   
score 2022                         Open Access - Established with Julieth   
Aliya CNP                               2022  
  
  
  
                                                    Last Documented On   
2 7:41PM ; [facility]  
  
  
  
                                                    Bipolar I disorder, most rec  
ent   
episode, manic                           Established Patient with Avis   
Felder LPCC-S                          2021  
  
  
  
                                                    Last Documented On   
1 1:35AM ; [facility]  
  
  
  
                                        Borderline personality disorder  Estab  
lished Patient with Avis   
Felder LPCC-S                          2021  
  
  
  
                                                    Last Documented On   
1 1:35AM ; [facility]  
  
  
  
                                        Post-traumatic stress disorder  Establ  
ished Patient with Avis   
Felder LPCC-S                          2021  
  
  
  
                                                    Last Documented On   
1 1:35AM ; [facility]  
  
  
  
                                                    Assessment of visit for: bhargavi myles for   
human immunodeficiency virus            Medical Established Patient with   
Doreen Deborah CNP                        2021  
  
  
  
                                                    Last Documented On   
1 2:56PM ; [facility]  
  
  
  
                                Nicotine dependence Medical Established Patient   
with Doreen Deborah CNP 2021  
  
  
  
                                                    Last Documented On   
1 2:56PM ; [facility]  
  
  
  
                                Tachycardia     Medical Established Patient with  
 Doreen Deborah CNP 2021  
  
  
  
                                                    Last Documented On   
1 2:56PM ; [facility]  
  
  
  
                                                    Z68.43 - Body mass index [BM  
I]   
50.0-59.9, adult                        Medical Established Patient with   
Doreen Deborah CNP                        2021  
  
  
  
                                                    Last Documented On   
1 2:56PM ; [facility]  
  
  
  
                                                    Bipolar I disorder, most rec  
ent   
episode, manic                           Established Patient with Leonie   
Short LISWS                             2021  
  
  
  
                                                    Last Documented On   
1 10:17AM ; [facility]  
  
  
  
                                                    Borderline personality disor  
danny per   
patient reported history                 Established Patient with Leonie   
Short LISWS                             2021  
  
  
  
                                                    Last Documented On   
1 10:17AM ; [facility]  
  
  
  
                                Nicotine dependence  Established Patient with   
Leonie Short LISWS 2021  
  
  
  
                                                    Last Documented On   
1 10:17AM ; [facility]  
  
  
  
                                        Post-traumatic stress disorder  Establ  
ished Patient with Leonie Short   
LISWS                                   2021  
  
  
  
                                                    Last Documented On   
1 10:17AM ; [facility]  
  
  
  
                                Body mass index Medical Established Patient with  
 Doreen Deborah CNP 2021  
  
  
  
                                                    Last Documented On   
1 5:23PM ; [facility]  
  
  
  
                                Morbid obesity  Medical Established Patient with  
 Doreen Deborah CNP 2021  
  
  
  
                                                    Last Documented On   
1 5:23PM ; [facility]  
  
  
  
                                        Nicotine dependence uncomplicated Medica  
l Established Patient with Doreen   
Deborah CNP                              2021  
  
  
  
                                                    Last Documented On   
1 5:23PM ; [facility]  
  
  
  
                                                    Z68.42 - Body mass index [BM  
I]   
45.0-49.9, adult                        Medical Established Patient with   
Doreen Deborah CNP                        2021  
  
  
  
                                                    Last Documented On   
1 5:23PM ; [facility]  
  
  
  
                                Episodic mood disorders On Call with Pamela edwards CNP 2021  
  
  
  
                                                    Last Documented On   
1 7:49AM ; [facility]  
  
  
  
                                                    Mood disorders, NOS per tabatha  
ent   
reported history                         Established Patient with Leonie   
Short LISWS                             2021  
  
  
  
                                                    Last Documented On   
1 7:15PM ; [facility]  
  
  
  
                                        Post-traumatic stress disorder  Establ  
ished Patient with Leonie Short   
LISWS                                   2021  
  
  
  
                                                    Last Documented On   
1 7:15PM ; [facility]  
  
  
  
                                Morbid obesity  Medical Established Patient with  
 Doreen Deborah CNP 2021  
  
  
  
                                                    Last Documented On   
1 12:31PM ; [facility]  
  
  
  
                                Otitis externa  Medical Established Patient with  
 Doreen Deborah CNP 2021  
  
  
  
                                                    Last Documented On   
1 12:31PM ; [facility]  
  
  
  
                                                    Z68.42 - Body mass index [BM  
I]   
45.0-49.9, adult                        Medical Established Patient with   
Doreen Deborah CNP                        2021  
  
  
  
                                                    Last Documented On   
1 12:31PM ; [facility]  
  
  
  
                                        Post-traumatic stress disorder BH Establ  
ished Patient with Leonie Short   
LISWS                                   06/10/2021  
  
  
  
                                                    Last Documented On 06/10/202  
1 10:05PM ; [facility]  
  
  
  
                                Morbid obesity  Medical Established Patient with  
 Doreen Deborah CNP 06/10/2021  
  
  
  
                                                    Last Documented On 06/10/202  
1 4:45PM ; [facility]  
  
  
  
                                                    Z68.42 - Body mass index [BM  
I]   
45.0-49.9, adult                        Medical Established Patient with   
Doreen Deborah CNP                        06/10/2021  
  
  
  
                                                    Last Documented On 06/10/202  
1 4:45PM ; [facility]  
  
  
  
                                        Post-traumatic stress disorder  Establ  
ished Patient with Leonie Short   
LISWS                                   2021  
  
  
  
                                                    Last Documented On   
1 11:59AM ; [facility]  
  
  
  
                                                    Assessment of visit for: scr  
eening for   
human immunodeficiency virus            Medical New Patient with Doreen   
Deborah CNP                              2021  
  
  
  
                                                    Last Documented On   
1 4:06PM ; [facility]  
  
  
  
                                                    Diabetes Risk Test Score was  
 one score   
2021                               Medical New Patient with Doreenpaola Cabrera CNP                              2021  
  
  
  
                                                    Last Documented On   
1 4:06PM ; [facility]  
  
  
  
                                Hypertension    Medical New Patient with Doreenpaola esposito CNP 2021  
  
  
  
                                                    Last Documented On   
1 4:06PM ; [facility]  
  
  
  
                                Morbid obesity  Medical New Patient with Doreen DAVID almonteen CNP 2021  
  
  
  
                                                    Last Documented On   
1 4:06PM ; [facility]  
  
  
  
                                Post-traumatic stress disorder Medical New Patie  
nt with Doreenpaola Cabrera CNP   
2021  
  
  
  
                                                    Last Documented On   
1 4:06PM ; [facility]  
  
  
  
                                                    Z68.42 - Body mass index [BM  
I]   
45.0-49.9, adult                        Medical New Patient with Doreenpaola Cabrera CNP                              2021  
  
  
  
                                                    Last Documented On   
1 4:06PM ; [facility]  
Work Phone: [phone]- Evaluation note  
  
Includes: Assessments for all patient encounters  
  
  
  
                                Findings        Encounter       Date  
   
                                                    Attention-deficit hyperactiv  
ity   
disorder                                 Established Patient with Leonie   
Short LISWS                             2024  
  
  
  
                                                    Last Documented On   
4 3:28PM ; [facility]  
  
  
  
                                                    Bipolar I disorder, most rec  
ent   
episode, depressed - mild                Established Patient with Leonie   
Short LISWS                             2024  
  
  
  
                                                    Last Documented On   
4 3:28PM ; [facility]  
  
  
  
                                        Post-traumatic stress disorder  Establ  
ished Patient with Leonie Short   
LISWS                                   2024  
  
  
  
                                                    Last Documented On   
4 3:28PM ; [facility]  
  
  
  
                                                    [E11.9 - Type 2 diabetes lobito  
litus   
without complications] type 2 diabetes   
mellitus                                Medical Established Patient with   
Doreen Cabrera CNP                        2024  
  
  
  
                                                    Last Documented On   
4 7:36PM ; [facility]  
  
  
  
                                                    [F41.1 - Generalized anxiety  
   
disorder] generalized anxiety   
disorder                                Medical Established Patient with   
Doreen Cabrera CNP                        2024  
  
  
  
                                                    Last Documented On   
4 7:36PM ; [facility]  
  
  
  
                                                    [I10 - Essential (primary)   
hypertension] essential hypertension    Medical Established Patient with   
Doreen Cabrera CNP                        2024  
  
  
  
                                                    Last Documented On   
4 7:36PM ; [facility]  
  
  
  
                                                    [N94.6 - Dysmenorrhea, unspe  
cified]   
dysmenorrhea                            Medical Established Patient with   
Doreen Cabrera CNP                        2024  
  
  
  
                                                    Last Documented On   
4 7:36PM ; [facility]  
  
  
  
                                                    [Z68.43 - Body mass index [B  
MI]   
50.0-59.9, adult] assessment of body   
mass index                              Medical Established Patient with   
Doreen Cabrera CNP                        2024  
  
  
  
                                                    Last Documented On   
4 7:36PM ; [facility]  
  
  
  
                                        Encounter for Immunization Medical Estab  
lished Patient with Doreen Cabrera   
CNP                                     2024  
  
  
  
                                                    Last Documented On   
4 7:36PM ; [facility]  
  
  
  
                                        Venipuncture was performed Medical Estab  
lished Patient with Doreen Cabrera   
CNP                                     2024  
  
  
  
                                                    Last Documented On   
4 7:36PM ; [facility]  
  
  
  
                                        Attention-deficit hyperactivity disorder  
  Established Patient with   
Leonie Short LISWS                     2024  
  
  
  
                                                    Last Documented On   
4 10:10AM ; [facility]  
  
  
  
                                                    Bipolar I disorder, most rec  
ent   
episode, depressed - mild                Established Patient with Leonie   
Short LISWS                             2024  
  
  
  
                                                    Last Documented On   
4 10:10AM ; [facility]  
  
  
  
                                        Post-traumatic stress disorder  Establ  
ished Patient with Leonie Short   
LISWS                                   2024  
  
  
  
                                                    Last Documented On   
4 10:10AM ; [facility]  
  
  
  
                                        [D64.9 - Anemia, unspecified] anemia Med  
ical Established Patient with   
Doreen Cabrera CNP                        2024  
  
  
  
                                                    Last Documented On   
4 6:51PM ; [facility]  
  
  
  
                                                    [Z68.43 - Body mass index [B  
MI]   
50.0-59.9, adult] assessment of body   
mass index                              Medical Established Patient with   
Doreen Cabrera CNP                        2024  
  
  
  
                                                    Last Documented On   
4 6:51PM ; [facility]  
  
  
  
                                                    Bipolar affective disorder,   
current   
episode depressed, mild                  Established Patient with Leonie Garrett LISWS                             2024  
  
  
  
                                                    Last Documented On   
4 5:16PM ; [facility]  
  
  
  
                                        Post-traumatic stress disorder  Establ  
ished Patient with Leonie Short   
LISWS                                   2024  
  
  
  
                                                    Last Documented On   
4 5:16PM ; [facility]  
  
  
  
                                                    Undifferentiated attention d  
eficit   
disorder                                 Established Patient with   
Leonie Short LISWS                     2024  
  
  
  
                                                    Last Documented On   
4 5:16PM ; [facility]  
  
  
  
                                        Visit for: screening for disorder BH Est  
ablished Patient with Leonie Garrett LISWS                             2024  
  
  
  
                                                    Last Documented On   
4 5:16PM ; [facility]  
  
  
  
                                                    [Z68.43 - Body mass index [B  
MI]   
50.0-59.9, adult] assessment of body   
mass index                              Medical Established Patient with   
Doreen Cabrera CNP                        2024  
  
  
  
                                                    Last Documented On   
4 7:50PM ; [facility]  
  
  
  
                                        Attention-deficit hyperactivity disorder  
 Medical Established Patient with   
Doreen Cabrera CNP                        2024  
  
  
  
                                                    Last Documented On   
4 7:50PM ; [facility]  
  
  
  
                                                    Diabetes Risk Test Score was  
 three   
score 3/27/2024                         Medical Established Patient with Doreen Cabrera CNP                              2024  
  
  
  
                                                    Last Documented On   
4 7:50PM ; [facility]  
  
  
  
                                        Visit for: screening for STD Medical Est  
ablished Patient with Doreen Cabrera   
CNP                                     2024  
  
  
  
                                                    Last Documented On   
4 7:50PM ; [facility]  
  
  
  
                                                    [Z68.32 - Body mass index [B  
MI]   
32.0-32.9, adult] assessment of body   
mass index                              Medical Established Patient with   
Doreen Cabrera CNP                        2023  
  
  
  
                                                    Last Documented On   
3 6:01PM ; [facility]  
  
  
  
                                        Borderline personality disorder Medical   
Established Patient with Doreen Cabrera CNP                              2023  
  
  
  
                                                    Last Documented On   
3 6:01PM ; [facility]  
  
  
  
                                        Screening for diabetes mellitus Medical   
Established Patient with Doreenpaola Cabrera CNP                              2023  
  
  
  
                                                    Last Documented On   
3 6:01PM ; [facility]  
  
  
  
                                        Assessment of body mass index Medical Es  
tablished Patient with Doreenpaola Cabrera CNP                              10/21/2022  
  
  
  
                                                    Last Documented On 10/21/202  
2 2:45PM ; [facility]  
  
  
  
                                                    Bipolar affective disorder,   
current   
episode manic                            Telebehavioral [facility] with Haylie   
Aguilar LPCC-S                        2022  
  
  
  
                                                    Last Documented On   
2 3:22PM ; [facility]  
  
  
  
                                                    Bipolar affective disorder,   
current   
episode manic                            Established Patient with Haylie   
Aguilar LPCC-S                        2022  
  
  
  
                                                    Last Documented On   
2 2:28PM ; [facility]  
  
  
  
                                                    Bipolar affective disorder,   
current   
episode depressed, severe with   
psychosis                                Telebehavioral Health with Haylie   
Aguilar LPCC-S                        2022  
  
  
  
                                                    Last Documented On   
2 3:29PM ; [facility]  
  
  
  
                                                    Assessment of body mass inde  
x [Body   
mass index [BMI] 50.0-59.9, adult]      Open Access - Established with Julieth   
Aliya CNP                               2022  
  
  
  
                                                    Last Documented On   
2 9:36AM ; [facility]  
  
  
  
                                                    Bipolar I disorder, most rec  
ent   
episode, manic                          Open Access - Established with Julieth   
Aliya CNP                               2022  
  
  
  
                                                    Last Documented On   
2 9:36AM ; [facility]  
  
  
  
                                        Post-traumatic stress disorder BH Establ  
ished Patient with Haylie Aguilar   
LPCC-S                                  2022  
  
  
  
                                                    Last Documented On   
2 3:20PM ; [facility]  
  
  
  
                                No cough        Medical Established Patient with  
 Doreenpaola Cabrera CNP 2022  
  
  
  
                                                    Last Documented On   
2 4:04PM ; [facility]  
  
  
  
                                                    Z68.43 - Body mass index [BM  
I]   
50.0-59.9, adult                        Medical Established Patient with   
Doreen Deborah CNP                        2022  
  
  
  
                                                    Last Documented On   
2 4:04PM ; [facility]  
  
  
  
                                                    Borderline personality disor  
danny Pt   
reported hx of sx/dx                     Established Patient with Haylie Aguilar LPCC-S                        2022  
  
  
  
                                                    Last Documented On   
2 4:08PM ; [facility]  
  
  
  
                                                    Assessment of body mass inde  
x [Body   
mass index [BMI] 50.0-59.9, adult]      Open Access - Established with Julieth   
Aliya CNP                               2022  
  
  
  
                                                    Last Documented On   
2 7:41PM ; [facility]  
  
  
  
                                                    Diabetes Risk Test Score was  
 three   
score 2022                         Open Access - Established with Julieth   
Aliya CNP                               2022  
  
  
  
                                                    Last Documented On   
2 7:41PM ; [facility]  
  
  
  
                                                    Bipolar I disorder, most rec  
ent   
episode, manic                           [facility]                          [facility]  
  
  
  
                                                    Last Documented On   
1 1:35AM ; [facility]  
  
  
  
                                        Borderline personality disorder  Estab  
lished Patient with [facility]   
[facility]                          20[facility]  
  
  
  
                                                    Last Documented On   
1 1:35AM ; [facility]  
  
  
  
                                        Post-traumatic stress disorder  Establ  
ished Patient with Avis   
Felder LPCC-S                          2021  
  
  
  
                                                    Last Documented On   
1 1:35AM ; [facility]  
  
  
  
                                                    Assessment of visit for: bhargavi guevaraning for   
human immunodeficiency virus            Medical Established Patient with   
Doreen Deborah CNP                        2021  
  
  
  
                                                    Last Documented On   
1 2:56PM ; [facility]  
  
  
  
                                Nicotine dependence Medical Established Patient   
with Doreen Deborah CNP 2021  
  
  
  
                                                    Last Documented On   
1 2:56PM ; [facility]  
  
  
  
                                Tachycardia     Medical Established Patient with  
 Doreen Deborah CNP 2021  
  
  
  
                                                    Last Documented On   
1 2:56PM ; [facility]  
  
  
  
                                                    Z68.43 - Body mass index [BM  
I]   
50.0-59.9, adult                        Medical Established Patient with   
Doreen Deborah CNP                        2021  
  
  
  
                                                    Last Documented On   
1 2:56PM ; [facility]  
  
  
  
                                                    Bipolar I disorder, most rec  
ent   
episode, manic                           Established Patient with Leonie   
Short LISWS                             2021  
  
  
  
                                                    Last Documented On   
1 10:17AM ; [facility]  
  
  
  
                                                    Borderline personality disor  
danny per   
patient reported history                 Established Patient with Leonie   
Short LISWS                             2021  
  
  
  
                                                    Last Documented On   
1 10:17AM ; [facility]  
  
  
  
                                Nicotine dependence BH Established Patient with   
Leonie Short LISWS 2021  
  
  
  
                                                    Last Documented On   
1 10:17AM ; [facility]  
  
  
  
                                        Post-traumatic stress disorder  Establ  
ished Patient with Leonie Short   
LISWS                                   2021  
  
  
  
                                                    Last Documented On   
1 10:17AM ; [facility]  
  
  
  
                                Body mass index Medical Established Patient with  
 Doreen Deborah CNP 2021  
  
  
  
                                                    Last Documented On   
1 5:23PM ; [facility]  
  
  
  
                                Morbid obesity  Medical Established Patient with  
 Doreen Deborah CNP 2021  
  
  
  
                                                    Last Documented On   
1 5:23PM ; [facility]  
  
  
  
                                        Nicotine dependence uncomplicated Medica  
l Established Patient with Doreen   
Deborah CNP                              2021  
  
  
  
                                                    Last Documented On   
1 5:23PM ; [facility]  
  
  
  
                                                    Z68.42 - Body mass index [BM  
I]   
45.0-49.9, adult                        Medical Established Patient with   
Doreen Deborah CNP                        2021  
  
  
  
                                                    Last Documented On   
1 5:23PM ; [facility]  
  
  
  
                                Episodic mood disorders On Call with Pamela edwards CNP 2021  
  
  
  
                                                    Last Documented On   
1 7:49AM ; [facility]  
  
  
  
                                                    Mood disorders, NOS per tabatha  
ent   
reported history                         Established Patient with Leonie   
Short LISWS                             2021  
  
  
  
                                                    Last Documented On   
1 7:15PM ; [facility]  
  
  
  
                                        Post-traumatic stress disorder  Establ  
ished Patient with Leonie Short   
LISWS                                   2021  
  
  
  
                                                    Last Documented On   
1 7:15PM ; [facility]  
  
  
  
                                Morbid obesity  Medical Established Patient with  
 Doreen Deborah CNP 2021  
  
  
  
                                                    Last Documented On   
1 12:31PM ; [facility]  
  
  
  
                                Otitis externa  Medical Established Patient with  
 Doreen Deborah CNP 2021  
  
  
  
                                                    Last Documented On   
1 12:31PM ; [facility]  
  
  
  
                                                    Z68.42 - Body mass index [BM  
I]   
45.0-49.9, adult                        Medical Established Patient with   
Doreen Deborah CNP                        2021  
  
  
  
                                                    Last Documented On   
1 12:31PM ; [facility]  
  
  
  
                                        Post-traumatic stress disorder  Establ  
ished Patient with Leonie Short   
LISWS                                   06/10/2021  
  
  
  
                                                    Last Documented On 06/10/202  
1 10:05PM ; [facility]  
  
  
  
                                Morbid obesity  Medical Established Patient with  
 Doreen Deborah CNP 06/10/2021  
  
  
  
                                                    Last Documented On 06/10/202  
1 4:45PM ; [facility]  
  
  
  
                                                    Z68.42 - Body mass index [BM  
I]   
45.0-49.9, adult                        Medical Established Patient with   
Doreen Deborah CNP                        06/10/2021  
  
  
  
                                                    Last Documented On 06/10/202  
1 4:45PM ; [facility]  
  
  
  
                                        Post-traumatic stress disorder  Establ  
ished Patient with Leonie Short   
LISWS                                   2021  
  
  
  
                                                    Last Documented On   
1 11:59AM ; [facility]  
  
  
  
                                                    Assessment of visit for: scr  
eening for   
human immunodeficiency virus            Medical New Patient with Doreen   
Deborah CNP                              2021  
  
  
  
                                                    Last Documented On   
1 4:06PM ; [facility]  
  
  
  
                                                    Diabetes Risk Test Score was  
 one score   
2021                               Medical New Patient with Doreen   
Deborah CNP                              2021  
  
  
  
                                                    Last Documented On   
1 4:06PM ; [facility]  
  
  
  
                                Hypertension    Medical New Patient with Doreen C  
cate CNP 2021  
  
  
  
                                                    Last Documented On   
1 4:06PM ; [facility]  
  
  
  
                                Morbid obesity  Medical New Patient with Doreen C  
cate CNP 2021  
  
  
  
                                                    Last Documented On   
1 4:06PM ; [facility]  
  
  
  
                                Post-traumatic stress disorder Medical New Patie  
nt with Doreen Deborah CNP   
2021  
  
  
  
                                                    Last Documented On   
1 4:06PM ; [facility]  
  
  
  
                                                    Z68.42 - Body mass index [BM  
I]   
45.0-49.9, adult                        Medical New Patient with Doreen   
Deborah CNP                              2021  
  
  
  
                                                    Last Documented On   
1 4:06PM ; [facility]  
Work Phone: [phone]- Progress note*   
  
Progress note  
  
  
  
                                Date            Encounter       Last Documented   
by  
   
                                2024      Medical Established Patient Last  
 documented on 2024; 7:36 PM,   
Doreen Cabrera CNP; [facility]  
  
  
  
                                                      
  
  
** Active Problems & Conditions **  
- F90.9 - Attention-deficit Hyperactivity Disorder  
- F31.31 - Bipolar I Disorder, Most Recent Episode, Depressed Mild  
- E11.9 - Diabetes Mellitus Type 2 Without Complication  
- N94.6 - Dysmenorrhea  
- F43.10 - Post-traumatic Stress Disorder  
  
** Chief Complaint **  
The Chief Complaint is: F/u on mental health. Patient stopped metformin and has   
gained weight. Patient has appointment scheduled with Dr. Patterson. Patient has 
been   
menstruating since stopping the metformin 4-5 months. Does not think buspirone 
is   
working because of increased anxiety.  
Patient did not get blood work completed.  
  
** Referred Here **  
Not referred by urgent care clinic and not the emergency room. No prior 
encounters.  
- Data to be reviewed: no clinical lab tests  
  
** History of Present Illness **  
Linnette Beckham is a 24 year old female.  
- Allergy list reviewed - Reviewed Medications - Medication list reviewed  
- Date of last menstruation patient unsure of date - Pregnancy test - would not 
like   
a pregnancy test  
Presents with sig other , anxiety is  not good  and has been bleeding ever since
   
stopping metformin r/t intolerance 3 months ago , has appt with gyn but not til   
october agreeable to progesterone ocp to help get bleeding to stop  
  
** Current Medication **  
- ARIPiprazole 5 MG Oral Tablet take 1 tablet by mouth once daily, 30 days, 5 
refills  
- Intuniv 1 MG Oral Tablet Extended Release 24 Hour take 1 tablet by mouth once 
daily   
at bedtime, 30 days, 5 refills  
- lamoTRIgine 100 MG Oral Tablet take 1 tablet by mouth once daily, 30 days, 5   
refills  
- MiraLax 17 GM/SCOOP Oral Powder mix 1 scoop with 8 ounces once daily, 30 days,
 2   
refills  
- Narcan 4 MG/0.1ML Nasal Liquid spray in nostril for symptoms of overdose , may
   
repeat in 2 minutes if symptoms persist, 1 days, 0 refills  
- Prazosin HCl 1 MG Oral Capsule take 1 capsule by mouth twice daily, 30 days, 5
   
refills  
- Sertraline HCl 50 MG Oral Tablet take 1 tablet by mouth once daily, 30 days, 5
   
refills  
- traZODone HCl 100 MG Oral Tablet take 1 tablet by mouth twice daily, 30 days, 
5   
refills  
  
** Past Medical/Surgical History **  
Reported:  
Has sex without a condom.  
Medical: No previous hospitalizations. Chronic illness and Sexually transmitted   
infection Partners sexually transmitted infection status known.  
Immunization History: Recent immunization for flu.  
Exposure: Exposure to COVID-19.  
Pregnancy: Previously pregnant 1 time(s) and para having 0 live birth(s). Not   
planning a pregnancy in the next year.  
Diagnoses:  
Polycystic Ovarian Syndrome (PCOS).  
Migraine headache.  
Psychiatric disorders biopolar disorder  
Anxiety disorder  
POTS.  
Procedural:  
- Insertion of ear pressure equalization tubes in both ears  
Surgical:  
- Tonsillectomy  
- Tonsillectomy with adenoidectomy  
  
** Social History **  
Environmental Exposure: Secondhand cigarette smoke exposure.  
Behavioral: Not a current tobacco user.  
Tobacco use: Using electronic cigarettes/vaping.  
Alcohol: A social drinker.  
Drug Use: Not using drugs denied by patient.  
Sexual: Sexually active age of sexual partner is _____ years old 21, sexual   
orientation Lesbian or David, gender identity Other, and birth control is being   
practiced Not Using - In Same Sex Relationship.  
  
** Allergies **  
- Abilify Reaction: groggy  
- Augmentin Reaction: Shock  
- Metformin Reaction: Nausea, Vomiting  
- NO KNOWN ENVIRONMENTAL ALLERGIES  
- NO KNOWN FOOD ALLERGIES  
- Sertraline Reaction: slow/groggy  
- Trazodone Hydrochloride Reaction: Panic attack  
  
** Family History **  
Paternal:  
Systemic hypertension  
Oncologic disorder  
Maternal:  
Systemic hypertension  
Epilepsy and recurrent seizures  
Psychiatric disorders  
Oncologic disorder  
Fraternal:  
Psychiatric disorders  
  
** Review Of Systems **  
Head: No head symptoms.  
Neck: No neck symptoms.  
Eyes: No eye symptoms.  
Otolaryngeal: No ear symptoms, no nasal symptoms, no nose and sinus finding, no   
throat symptoms, no oral cavity symptoms, and no jaw symptoms.  
Breasts: No breast symptoms.  
Cardiovascular: No cardiovascular symptoms and no chest pain or discomfort.  
Pulmonary: No pulmonary symptoms.  
Gastrointestinal: No gastrointestinal symptoms.  
Genitourinary: No genitourinary symptoms. Vaginal discharge has been on period x
 3   
months , not heavy and no clotting.  
Musculoskeletal: No musculoskeletal symptoms.  
Neurological: No neurological symptoms.  
Psychological: Anxiety, depression, and sleep.  
Skin: No skin symptoms.  
Cardiovascular: Edema not present.  
  
** Physical Findings **  
- Vitals taken 2024 04:45 pm  
BP-Sitting R151/98 mmHg  
BP Cuff SizeLarge  
Pulse Rate-Rybabln121 bpm  
Eqnzly65 in  
Xpbdnt205 lbs 9.6 oz  
Body Mass Index57.9 kg/m2  
Body Surface Area2.4 m2  
Oxygen Nelubitddb50 %  
  
- Vitals taken 2024 05:21 pm  
BP-Sitting R154/86 mmHg  
BP Cuff SizeLarge  
  
- Vitals taken 2024 05:46 pm  
manual large  
BP-Sitting R144/84 mmHg  
BP Cuff SizeLarge  
  
Vital Signs:  
- Systolic Blood Pressure > or = 140 mmHg.  
- Diastolic blood pressure 80-89 mmHg.  
General Appearance:  
- Awake. - Alert. - Well developed. - Well nourished. - In no acute distress.  
Lungs:  
- Respiration rhythm and depth was normal. - Clear to auscultation.  
Cardiovascular:  
Heart Rate And Rhythm: - Normal.  
Heart Sounds: - Normal.  
Murmurs: - No murmurs were heard.  
Abdomen:  
Visual Inspection: - Abdomen was normal on visual inspection.  
Musculoskeletal System:  
General/bilateral: - Normal movement of all extremities.  
Skin:  
- General appearance was normal.  
  
** Tests **  
Blood Analysis:  
Hemoglobin Studies: ValueDate  
Blood hemoglobin A1c 7.1%2024  
Blood Endocrine Laboratory Tests: Value  
Blood glucose level by fingerstick Non-fasting 112 mg/dl  
Laboratory-based Chemistry:  
Other Laboratory Tests:  
Screening for sexually transmitted infections was not performed.  
Laboratory Studies:  
Vascular Procedures:  
Right Antecubital Space.  
Left Antecubital Space.  
  
** Assessment **  
- Z68.43 - Body mass index [BMI] 50.0-59.9, adult  
- Z01.812 - Encounter for preprocedural laboratory examination  
- Z23 - Encounter for immunization  
- I10 - Essential (primary) hypertension  
- N94.6 - Dysmenorrhea, unspecified  
- E11.9 - Type 2 diabetes mellitus without complications  
- F41.1 - Generalized anxiety disorder  
  
** Therapy **  
- Immunization administration age 18 or younger, with counseling, first / only   
vaccine component and age 18 or younger, with counseling, each additional 
vaccine   
component.  
  
** Vaccinations **  
- HPV-Gardasil 9 Dose #1 Status: Administered Date: 2024  
- PCV (Prevnar-20) Dose #1 Status: Administered Date: 2024  
  
- Received dose of human papilloma virus vaccine  
- Received dose of pneumococcal conjugate vaccine, 20-valent, IM use  
  
** Counseling/Education **  
- No not wishing to stop using electronic cigarettes/vaping  
- Discussed nutritional needs teach healthy choices including fruits and 
vegetables  
- Patient education about a proper diet  
- Referred Patient to a Diabetes Self-Management Program  
- Discussed concerns about exercise: promote physical activity  
  
** Plan **  
StartCited- Dysmenorrhea, unspecified  
Outside Diagn Tests/Ultrasound: US Transvaginal ([id number])  
Instructions: [place]  
Slynd 4 MG tablet take 1 tablet by mouth at the same time everyday to help 
regulate   
your period, 28 days, 2 refills  
EndCited  
StartCited- Generalized anxiety disorder  
busPIRone HCl 10 MG tablet take 1 tablet by mouth twice daily (d/c 5 mg rx), 30 
days,   
5 refills  
EndCited  
StartCited- Other  
Follow-up Appointment  
1 month med f/u and 2nd hpv  
EndCited  
StartCited- Type 2 diabetes mellitus without complications  
Januvia 50 MG tablet take 1 tablet by mouth once daily, 30 days, 5 refills  
Lisinopril 5 MG tablet take 1 tablet by mouth once daily, 30 days, 5 refills  
Atorvastatin Calcium 20 MG tablet take 1 tablet by mouth once daily at bedtime, 
30   
days, 5 refills  
Blood Glucose System Sriram Kit use to check sugars once daily (use what is 
covered), 30   
days, 0 refills  
Blood Glucose Test strip use to cehck sugars (dispense what is covered), 90 
days, 3   
refills  
Alcohol Pads 70% pad use to cleanse skin prior to checking blood sugars, 90 
days, 3   
refills  
CareSens Lancets each use to check sugars once daily (dispense what is covered),
 90   
days, 3 refills  
EndCited  
Modify diet , limit starchy carbs such as breads / pastas / sweets.  
  
** Other **  
- Patient tolerated venipuncture well; - Number of attempts for venipuncture two  
  
** Practice Management **  
Hemoglobin A1c level >=7.0 and < 8.0%.  
  
** Care Team **  
- Doreen Cabrera CNP  
  
** Health Reminders **  
- Assess BMI satisfied 2024.  
- Assess Tobacco Use satisfied 2024.  
- Follow Up Plan BMI Management satisfied 2024.  
- Hemoglobin A1c satisfied 2024.  
- Statin Therapy Needed satisfied 2024.  
  
** User Defined 1 **  
Not planning a pregnancy in the next year.  
  
  
[facility]08- Progress note*   
  
Progress note  
  
  
  
                                Date            Encounter       Last Documented   
by  
   
                                2024       Established Patient Last docu  
mented on 2024; 3:28 PM,   
Leonie HUTCHINSON; [facility]  
  
  
  
                                                      
  
  
** Active Problems & Conditions **  
- F90.9 - Attention-deficit Hyperactivity Disorder  
- F31.31 - Bipolar I Disorder, Most Recent Episode, Depressed Mild  
- E11.9 - Diabetes Mellitus Type 2 Without Complication  
- N94.6 - Dysmenorrhea  
- F43.10 - Post-traumatic Stress Disorder  
  
** Chief Complaint **  
The Chief Complaint is: [facility] met with patient to follow-up regarding mood and   
medications. Patient had requested to meet with a different [facility] in office but 
was not   
avialable and agreeable to meet with this  provider. Patient reports noticing 
that   
anxiety has continued to feel increased recently. Patient reports continued 
stressors   
related to physical health. Patient has scheduled with specialists, but 
appointment   
is not for several weeks still. Patient reports that Buspar is not helpful with   
increased anxiety. Patient reports experiencing nightmares and vivid dreams 
again and   
waking up with increased anxiety. Patient reports no thoughts of harm toward 
self or   
others.  
  
** History of Present Illness **  
Linnette Beckham is a 24 year old female.  
- Appetite not normal - Irritability  
- Anxiety - Nightmares - Energy level is fair - Feeling guilty - Easily 
distracted -   
Racing thoughts - No depression - No loss of interest in activities - No social   
isolation  
  
** Current Medication **  
- Alcohol Pads 70% use to cleanse skin prior to checking blood sugars, 90 days, 
3   
refills  
- ARIPiprazole 5 MG Oral Tablet take 1 tablet by mouth once daily, 30 days, 5 
refills  
- Atorvastatin Calcium 20 MG Oral Tablet take 1 tablet by mouth once daily at   
bedtime, 30 days, 5 refills  
- Blood Glucose System Sriram Kit use to check sugars once daily (use what is 
covered),   
30 days, 0 refills  
- Blood Glucose Test In Vitro Strip use to cehck sugars (dispense what is 
covered),   
90 days, 3 refills  
- busPIRone HCl 10 MG Oral Tablet take 1 tablet by mouth twice daily (d/c 5 mg 
rx),   
30 days, 5 refills  
- CareSens Lancets Miscellaneous use to check sugars once daily (dispense what 
is   
covered), 90 days, 3 refills  
- Intuniv 1 MG Oral Tablet Extended Release 24 Hour take 1 tablet by mouth once 
daily   
at bedtime, 30 days, 5 refills  
- Januvia 50 MG Oral Tablet take 1 tablet by mouth once daily, 30 days, 5 
refills  
- lamoTRIgine 100 MG Oral Tablet take 1 tablet by mouth once daily, 30 days, 5   
refills  
- Lisinopril 5 MG Oral Tablet take 1 tablet by mouth once daily, 30 days, 5 
refills  
- MiraLax 17 GM/SCOOP Oral Powder mix 1 scoop with 8 ounces once daily, 30 days,
 2   
refills  
- Narcan 4 MG/0.1ML Nasal Liquid spray in nostril for symptoms of overdose , may
   
repeat in 2 minutes if symptoms persist, 1 days, 0 refills  
- Prazosin HCl 1 MG Oral Capsule take 1 capsule by mouth twice daily, 30 days, 5
   
refills  
- Sertraline HCl 50 MG Oral Tablet take 1 tablet by mouth once daily, 30 days, 5
   
refills  
- Slynd 4 MG Oral Tablet take 1 tablet by mouth at the same time everyday to 
help   
regulate your period, 28 days, 2 refills  
- traZODone HCl 100 MG Oral Tablet take 1 tablet by mouth twice daily, 30 days, 
5   
refills  
  
** Social History **  
Medical: Chronic illness.  
Environmental Exposure: No secondhand cigarette smoke exposure.  
Personal: Recent emotional stress.  
Behavioral: Not a current tobacco user.  
Tobacco use: Using electronic cigarettes/vaping.  
Alcohol: Not using alcohol.  
Drug Use: Not using drugs.  
  
** Physical Findings **  
General Appearance:  
- Normal Appearance.  
Neurological:  
- Estimated intelligence was normal. - Oriented to time, place, and person. - No
   
hallucinations. - Judgement was not impaired.  
Speech: - Is Normal.  
Psychiatric:  
- Mood was anxious. - Mood was concerned. - Attitude Open.  
Demonstrated Behavior: - Motor Activity Normal Activity. - Eye Contact 
Appropriate.  
Affect: - Congruent with the mood.  
Thought Content: - Insight was intact. - No delusions. - No suicidal ideation. -
 No   
Passive thoughts of Death. - No suicidal plans. - No suicidal intent. - No 
homicidal   
ideations. - No homicidal plans. - No homicidal intent.  
Past Medical:  
- No repetitive self injurious behavior.  
  
** Assessment **  
- F90.9 - Attention-deficit hyperactivity disorder, unspecified type  
- F31.31 - Bipolar disorder, current episode depressed, mild  
- F43.10 - Post-traumatic stress disorder, unspecified  
  
** Therapy **  
- Brief solution-focused therapy.  
- Adherent with medications.  
-  Visit 30 Minutes.  
- Plan - PCP to have patient increase Buspar to 10mg twice daily and continue   
Prazosin 1mg in the morning and increase to 2 mg at bedtime. Patient agreeable 
to   
plan and Collaborated with patient and provider:  
  
** Counseling/Education **  
P offered active and supportive listening and processed recent stressors.  
BHP discussed coping skills and supports to implement in managing increased 
anxiety   
such as tools learned previously in therapy.  
BHP discussed sleep hygiene strategies that can be implemented.  
P encouraged patient to follow-up with specialists as scheduled.  
  
** Plan **  
P to task other  provider to follow-up with patient regarding medication 
changes.  
  
** Care Team **  
- Doreen Cabrera CNP  
  
** Health Reminders **  
- Assess Tobacco Use satisfied 2024.  
  
  
[facility]06- Instructions  
  
Includes: Instructions for all patient encounters  
  
  
  
                                                    Education and Decision Aids   
were provided during visit for:  
   
                                                    Discussed nutritional needs   
teach healthy choices including fruits and   
vegetables  
   
                                                    Last Documented On   
4 4:46PM ; [facility]  
   
                                                    Patient education about a pr  
oper diet  
   
                                                    Last Documented On   
4 4:46PM ; [facility]  
   
                                                    Discussed concerns about exe  
rcise : promote physical activity  
   
                                                    Last Documented On   
4 4:46PM ; [facility]  
   
                                                    Not requesting contraception  
   
                                                    Last Documented On   
4 4:46PM ; [facility]  
rtive listening and processed current stressors   
related   
to getting medications. ~[facility] discussed coping skills and supports to implement 
in   
daily routine. ~P discussed progress patient has felt they have made recently 
and   
encouraged continued follow-up with providers to address health  
   
                                                    Last Documented On   
4 5:16PM ; [facility]  
   
                                                    Discussed nutritional needs   
teach healthy choices including fruits and   
vegetables  
   
                                                    Last Documented On   
4 7:14PM ; [facility]  
   
                                                    Patient education about a pr  
oper diet  
   
                                                    Last Documented On   
4 7:14PM ; [facility]  
   
                                                    Discussed concerns about exe  
rcise : promote physical activity  
   
                                                    Last Documented On   
4 7:14PM ; [facility]  
   
                                                    Not requesting contraception  
   
                                                    Last Documented On   
4 7:14PM ; [facility]  
   
                                                    Discussed nutritional needs   
teach healthy choices including fruits and   
vegetables  
   
                                                    Last Documented On   
3 5:15PM ; [facility]  
   
                                                    Patient education about a pr  
oper diet  
   
                                                    Last Documented On   
3 5:15PM ; [facility]  
   
                                                    Discussed concerns about exe  
rcise : promote physical activity  
   
                                                    Last Documented On   
3 5:15PM ; [facility]  
   
                                                    Discussed nutritional needs   
teach healthy choices including fruits and   
vegetables  
   
                                                    Last Documented On 10/21/202  
2 1:42PM ; [facility]  
   
                                                    Patient education about a pr  
oper diet  
   
                                                    Last Documented On 10/21/202  
2 1:42PM ; [facility]  
   
                                                    Discussed concerns about exe  
rcise : promote physical activity  
   
                                                    Last Documented On 10/21/202  
2 1:42PM ; [facility]  
 and supportive feedback; normalized emotions and   
feelings, also provided pt time to process any current stressors. ~Promoted and   
encouraged follow-through with scheduling psychiatric services  
   
                                                    Last Documented On   
2 3:21PM ; [facility] provided supportive, empa  
thic listening and reflective feedback. ~Explored,   
encouraged, and supported the pt to discuss current sx/mood, assess risk for   
harm/need, coping mechanisms, support network and safety planning. ~Supported 
pt's   
plan to f/up with Dr. Alonso, as planned at [place], [place]. ~Encouraged 
pt to   
use safety plan, if needed to ensure she remains safe  
   
                                                    Last Documented On   
2 2:27PM ; [facility]  
   
                                                    Discussed nutritional needs   
teach healthy choices including fruits and   
vegetables  
   
                                                    Last Documented On   
2 3:20PM ; [facility]  
   
                                                    Patient education about a pr  
oper diet  
   
                                                    Last Documented On   
2 3:20PM ; [facility]  
   
                                                    Discussed concerns about exe  
rcise : promote physical activity ~ ~Will restart   
trazodone and prazosin ~ ~Patient is planning to have brother stay with her for 
a few   
days for emotional support ~ ~Follow up with PCP at next scheduled visit ~ ~Call
   
psychiatrist office to schedule appt ~ ~Call counselor  
   
                                                    Last Documented On   
2 9:35AM ; [facility]  
   
                                                    Provided supportive listenin  
g and empathic feedback; encouraged, explored, and   
supported the pt as she processed current symptoms, Issues, and concerns. 
~Discussed   
past tx and explored current needs/options. Acknowledged and validated pt's 
thoughts   
and emotions. ~Explored coping mechanisms and support network; utilized 
opportunity   
for safety planning; promoted seeking positive support and seeking help, as 
needed  
   
                                                    Last Documented On   
2 3:28PM ; [facility] provided active listenin  
g, support and helped pt process though current   
symptoms   
and stressor(s). Discussed and explored past effectiveness of medication; 
identified   
objectives and future goals; promoted use of healthy coping mechanisms, and 
self-care   
practices  
   
                                                    Last Documented On   
2 3:19PM ; [facility]  
   
                                                    Reviewed side effects and Ri  
sks/Benefits analysis  
   
                                                    Last Documented On   
2 3:19PM ; [facility]  
   
                                                    Discussed nutritional needs   
teach healthy choices including fruits and   
vegetables  
   
                                                    Last Documented On   
2 3:15PM ; [facility]  
   
                                                    Patient education about a pr  
oper diet  
   
                                                    Last Documented On   
2 3:15PM ; [facility]  
   
                                                    Discussed concerns about exe  
rcise : promote physical activity  
   
                                                    Last Documented On   
2 3:15PM ; [facility] introduced pt to [facility] in  
tegrated model of care ~[facility] offered active listening  
 and   
supportive feedback; normalized emotions and feelings, also provided pt time to   
process any current stressors ~[facility] discussed potential benefits of counseling 
and   
supported re-engaging, as needed. ~[facility] encouraged pt to continue to make time to
   
implement self-care regimen and use coping methods, as needed  
   
                                                    Last Documented On   
2 4:07PM ; [facility]  
   
                                                    Discussed nutritional needs   
teach healthy choices including fruits and   
vegetables  
   
                                                    Last Documented On   
2 3:15PM ; [facility]  
   
                                                    Patient education about a pr  
oper diet  
   
                                                    Last Documented On   
2 3:15PM ; [facility]  
   
                                                    Inquiry and counseling about  
 medication administration and compliance  
   
                                                    Last Documented On   
2 7:37PM ; [facility]  
   
                                                    Discussed concerns about exe  
rcise : promote physical activity  
   
                                                    Last Documented On   
2 3:15PM ; [facility]  
   
                                                    Patient goals discussed  
   
                                                    Last Documented On   
2 7:37PM ; [facility]  
   
                                                    Ansewred pt's questions re B  
orderlline Personality D/O and Bipolar D/O raised by  
   
psychiatrist at [place]. ~Validated and normalized patient?s feelings while   
assisting to process recent events  
   
                                                    Last Documented On   
1 1:33AM ; [facility]  
   
                                                    Discussed nutritional needs   
teach healthy choices including fruits and   
vegetables  
   
                                                    Last Documented On   
1 2:04PM ; [facility]  
   
                                                    Patient education about a pr  
oper diet  
   
                                                    Last Documented On   
1 2:04PM ; [facility]  
   
                                                    Discussed concerns about exe  
rcise : promote physical activity  
   
                                                    Last Documented On   
1 2:04PM ; [facility]  
   
                                                    [facility] provided active listenin  
g, support and helped patient process through   
current   
symptoms and stressors with ongoing mental health concerns and medication 
changes.   
~[facility] discussed coping skills and supports that patient is implementing.  
discussed   
implementing coping skills as discussed with counseling and attending weekly   
appointments as scheduled with counselor. Patient was encouraged to continue 
writing   
down concerns with medications and discuss with providers. ~P reminded patient
 of   
crisis resources should they be needed. Patient reports having crisis resources 
and   
could return to ER  
   
                                                    Last Documented On   
1 10:17AM ; [facility]  
   
                                                    Patient education about a pr  
oper diet  
   
                                                    Last Documented On   
1 5:17PM ; [facility]  
   
                                                    Patient education about meal  
 planning  
   
                                                    Last Documented On   
1 5:17PM ; [facility]  
   
                                                    Education about changing eat  
ing habits  
   
                                                    Last Documented On   
1 5:17PM ; [facility]  
   
                                                    Patient education about high  
 fiber diet  
   
                                                    Last Documented On   
1 5:17PM ; [facility]  
   
                                                    Patient education about low   
fat diet  
   
                                                    Last Documented On   
1 5:17PM ; [facility]  
   
                                                    Patient education about low   
cholesterol diet  
   
                                                    Last Documented On   
1 5:17PM ; [facility]  
   
                                                    Patient education about low   
carbohydrate diet  
   
                                                    Last Documented On   
1 5:17PM ; [facility]  
   
                                                    Patient education about high  
 protein diet  
   
                                                    Last Documented On   
1 5:17PM ; [facility] offered active and suppo  
rtive listening, normalized emotions and feelings,   
and   
processed current stressors. ~[facility] discussed resources for finding a counselor 
and   
provided list of local resources. ~P discussed patients coping skills and 
supports   
and encouraged patient to continue to implement. ~P reminded patient of crisis
   
resources should they be needed  
   
                                                    Last Documented On  7:15PM ; [facility]  
   
                                                    Discussed nutritional needs   
teach healthy choices including fruits and   
vegetables  
   
                                                    Last Documented On  11:38AM ; [facility]  
   
                                                    Patient education about a pr  
oper diet  
   
                                                    Last Documented On  11:38AM ; [facility]  
   
                                                    Patient education about a pr  
oper diet  
   
                                                    Last Documented On  12:19PM ; [facility]  
   
                                                    Patient education about meal  
 planning  
   
                                                    Last Documented On   
1 12:19PM ; [facility]  
   
                                                    Education about changing eat  
ing habits  
   
                                                    Last Documented On  12:19PM ; [facility]  
   
                                                    Patient education about high  
 fiber diet  
   
                                                    Last Documented On   
1 12:19PM ; [facility]  
   
                                                    Patient education about low   
fat diet  
   
                                                    Last Documented On  12:19PM ; [facility]  
   
                                                    Patient education about low   
cholesterol diet  
   
                                                    Last Documented On  12:19PM ; [facility]  
   
                                                    Patient education about low   
carbohydrate diet  
   
                                                    Last Documented On   
1 12:19PM ; [facility]  
   
                                                    Patient education about high  
 protein diet  
   
                                                    Last Documented On   
1 12:19PM ; [facility]  
   
                                                    Discussed concerns about exe  
rcise : promote physical activity  
   
                                                    Last Documented On  11:38AM ; [facility] provided active listenin  
g, support and helped patient process through   
current   
symptoms and stressors related to family conflict. ~[facility] discussed coping skills 
and   
supports with patient that can be implemented and reminded patient of ways to 
access   
additional resources. ~[facility] discussed crisis resources and plan. Patient has 
crisis   
resources still available should they be needed  
   
                                                    Last Documented On 06/10/202  
1 7:04PM ; [facility]  
   
                                                    Discussed nutritional needs   
teach healthy choices including fruits and   
vegetables  
   
                                                    Last Documented On 06/10/202  
1 3:57PM ; [facility]  
   
                                                    Patient education about a pr  
oper diet  
   
                                                    Last Documented On 06/10/202  
1 3:57PM ; [facility]  
   
                                                    Discussed concerns about exe  
rcise : promote physical activity  
   
                                                    Last Documented On 06/10/202  
1 3:57PM ; [facility]. [facility] and PCP reassured   
patient of not sharing information with anyone unless she has signed a release 
for us   
to do so. ~P provided active listening, support and helped patient process 
through   
current symptoms and stressors. ~P discussed establishing counseling and   
psychiatry. P discussed EMDR therapy and ways to find provider who does this 
type   
of therapy. ~[facility] discussed crisis resources should mood worsen, [facility] provided 
text   
hotline number for crisis. P reviewed crisis plan with patient and patient is 
able   
to contact positive supports and family when feeling down  
   
                                                    Last Documented On   
1 11:46AM ; [facility]  
   
                                                    Discussed nutritional needs   
teach healthy choices including fruits and   
vegetables  
   
                                                    Last Documented On   
1 2:11PM ; [facility]  
   
                                                    Patient education about a pr  
oper diet  
   
                                                    Last Documented On   
1 2:11PM ; [facility]  
   
                                                    Discussed concerns about exe  
rcise : promote physical activity  
   
                                                    Last Documented On   
1 2:11PM ; [facility]  
Work Phone: [phone]- Evaluation note  
  
Includes: Assessments for all patient encounters  
  
  
  
                                Findings        Encounter       Date  
   
                                        [D64.9 - Anemia, unspecified] anemia Med  
ical Established Patient with Doreen Cabrera LARISSA                              2024  
  
  
  
                                                    Last Documented On   
4 6:51PM ; [facility]  
  
  
  
                                                    [Z68.43 - Body mass index [B  
MI]   
50.0-59.9, adult] assessment of body   
mass index                              Medical Established Patient with   
Doreen Cabrera LARISSA                        2024  
  
  
  
                                                    Last Documented On   
4 6:51PM ; [facility]  
  
  
  
                                                    Bipolar affective disorder,   
current   
episode depressed, mild                 BH Established Patient with Leonie   
Short LISWS                             2024  
  
  
  
                                                    Last Documented On   
4 5:16PM ; [facility]  
  
  
  
                                        Post-traumatic stress disorder BH Establ  
ished Patient with Leonie Short   
LISWS                                   2024  
  
  
  
                                                    Last Documented On   
4 5:16PM ; [facility]  
  
  
  
                                                    Undifferentiated attention d  
eficit   
disorder                                BH Established Patient with   
Leonie Short LISWS                     2024  
  
  
  
                                                    Last Documented On   
4 5:16PM ; [facility]  
  
  
  
                                        Visit for: screening for disorder BH Est  
ablished Patient with Leonie Garrett LISWS                             2024  
  
  
  
                                                    Last Documented On   
4 5:16PM ; [facility]  
  
  
  
                                                    [Z68.43 - Body mass index [B  
MI]   
50.0-59.9, adult] assessment of body   
mass index                              Medical Established Patient with   
Doreen Cabrera CNP                        2024  
  
  
  
                                                    Last Documented On   
4 7:50PM ; [facility]  
  
  
  
                                        Attention-deficit hyperactivity disorder  
 Medical Established Patient with   
Doreen Cabrera CNP                        2024  
  
  
  
                                                    Last Documented On   
4 7:50PM ; [facility]  
  
  
  
                                                    Diabetes Risk Test Score was  
 three   
score 3/27/2024                         Medical Established Patient with Doreen Cabrera CNP                              2024  
  
  
  
                                                    Last Documented On   
4 7:50PM ; [facility]  
  
  
  
                                        Visit for: screening for STD Medical Est  
ablished Patient with Doreen Cabrera   
CNP                                     2024  
  
  
  
                                                    Last Documented On   
4 7:50PM ; [facility]  
  
  
  
                                                    [Z68.32 - Body mass index [B  
MI]   
32.0-32.9, adult] assessment of body   
mass index                              Medical Established Patient with   
Doreen Cabrera CNP                        2023  
  
  
  
                                                    Last Documented On   
3 6:01PM ; [facility]  
  
  
  
                                        Borderline personality disorder Medical   
Established Patient with Doreen Cabrera CNP                              2023  
  
  
  
                                                    Last Documented On   
3 6:01PM ; [facility]  
  
  
  
                                        Screening for diabetes mellitus Medical   
Established Patient with Doreen Cabrera CNP                              2023  
  
  
  
                                                    Last Documented On   
3 6:01PM ; [facility]  
  
  
  
                                        Assessment of body mass index Medical Es  
tablished Patient with Doreen Cabrera CNP                              10/21/2022  
  
  
  
                                                    Last Documented On 10/21/202  
2 2:45PM ; [facility]  
  
  
  
                                                    Bipolar affective disorder,   
current   
episode manic                            [facility] with Haylie Aguilar LPCC-S                        2022  
  
  
  
                                                    Last Documented On   
2 3:22PM ; [facility]  
  
  
  
                                                    Bipolar affective disorder,   
current   
episode manic                            Established Patient with Haylienicanor Aguilar LPCC-S                        2022  
  
  
  
                                                    Last Documented On   
2 2:28PM ; [facility]  
  
  
  
                                                    Bipolar affective disorder,   
current   
episode depressed, severe with   
psychosis                                [facility] with Haylienicanor Aguilar LPCC-S                        2022  
  
  
  
                                                    Last Documented On   
2 3:29PM ; [facility]  
  
  
  
                                                    Assessment of body mass inde  
x [Body   
mass index [BMI] 50.0-59.9, adult]      Open Access - Established with Julieth   
Aliya CNP                               2022  
  
  
  
                                                    Last Documented On   
2 9:36AM ; [facility]  
  
  
  
                                                    Bipolar I disorder, most rec  
ent   
episode, manic                          Open Access - Established with Julieth   
Aliya CNP                               2022  
  
  
  
                                                    Last Documented On   
2 9:36AM ; [facility]  
  
  
  
                                        Post-traumatic stress disorder  Establ  
ished Patient with Haylienicanor Aguilar   
LPCC-S                                  2022  
  
  
  
                                                    Last Documented On   
2 3:20PM ; [facility]  
  
  
  
                                No cough        Medical Established Patient with  
 Doreen Deborah CNP 2022  
  
  
  
                                                    Last Documented On   
2 4:04PM ; [facility]  
  
  
  
                                                    Z68.43 - Body mass index [BM  
I]   
50.0-59.9, adult                        Medical Established Patient with   
Doreen Deborah CNP                        2022  
  
  
  
                                                    Last Documented On   
2 4:04PM ; [facility]  
  
  
  
                                                    Borderline personality disor  
danny Pt   
reported hx of sx/dx                     Established Patient with Haylienicanor Martinerson LPCC-S                        2022  
  
  
  
                                                    Last Documented On   
2 4:08PM ; [facility]  
  
  
  
                                                    Assessment of body mass inde  
x [Body   
mass index [BMI] 50.0-59.9, adult]      Open Access - Established with Julieth   
Aliya CNP                               2022  
  
  
  
                                                    Last Documented On   
2 7:41PM ; [facility]  
  
  
  
                                                    Diabetes Risk Test Score was  
 three   
score 2022                         Open Access - Established with Julieth   
Aliya CNP                               2022  
  
  
  
                                                    Last Documented On   
2 7:41PM ; [facility]  
  
  
  
                                                    Bipolar I disorder, most rec  
ent   
episode, manic                           Established Patient with Avis   
Felder LPCC-S                          2021  
  
  
  
                                                    Last Documented On   
1 1:35AM ; [facility]  
  
  
  
                                        Borderline personality disorder  Estab  
lished Patient with Avis   
Felder LPCC-S                          2021  
  
  
  
                                                    Last Documented On   
1 1:35AM ; [facility]  
  
  
  
                                        Post-traumatic stress disorder  Establ  
ished Patient with Avis   
Felder LPCC-S                          2021  
  
  
  
                                                    Last Documented On   
1 1:35AM ; [facility]  
  
  
  
                                                    Assessment of visit for: bhargavi guevaraning for   
human immunodeficiency virus            Medical Established Patient with   
Doreen Deborah CNP                        2021  
  
  
  
                                                    Last Documented On   
1 2:56PM ; [facility]  
  
  
  
                                Nicotine dependence Medical Established Patient   
with Doreen Deborah CNP 2021  
  
  
  
                                                    Last Documented On   
1 2:56PM ; [facility]  
  
  
  
                                Tachycardia     Medical Established Patient with  
 Doreen Deborah CNP 2021  
  
  
  
                                                    Last Documented On   
1 2:56PM ; [facility]  
  
  
  
                                                    Z68.43 - Body mass index [BM  
I]   
50.0-59.9, adult                        Medical Established Patient with   
Doreen Deborah CNP                        2021  
  
  
  
                                                    Last Documented On   
1 2:56PM ; [facility]  
  
  
  
                                                    Bipolar I disorder, most rec  
ent   
episode, manic                           Established Patient with Leonie   
Short LISWS                             2021  
  
  
  
                                                    Last Documented On   
1 10:17AM ; [facility]  
  
  
  
                                                    Borderline personality disor  
danny per   
patient reported history                 Established Patient with Leonie   
Short LISWS                             2021  
  
  
  
                                                    Last Documented On   
1 10:17AM ; [facility]  
  
  
  
                                Nicotine dependence  Established Patient with   
Leonie Short LISWS 2021  
  
  
  
                                                    Last Documented On   
1 10:17AM ; [facility]  
  
  
  
                                        Post-traumatic stress disorder  Establ  
ished Patient with Leonie Short   
LISWS                                   2021  
  
  
  
                                                    Last Documented On   
1 10:17AM ; [facility]  
  
  
  
                                Body mass index Medical Established Patient with  
 Doreen Deborah CNP 2021  
  
  
  
                                                    Last Documented On   
1 5:23PM ; [facility]  
  
  
  
                                Morbid obesity  Medical Established Patient with  
 Doreen Deborah CNP 2021  
  
  
  
                                                    Last Documented On   
1 5:23PM ; [facility]  
  
  
  
                                        Nicotine dependence uncomplicated Medica  
l Established Patient with Doreen   
Deborah CNP                              2021  
  
  
  
                                                    Last Documented On   
1 5:23PM ; [facility]  
  
  
  
                                                    Z68.42 - Body mass index [BM  
I]   
45.0-49.9, adult                        Medical Established Patient with   
Doreen Deborah CNP                        2021  
  
  
  
                                                    Last Documented On   
1 5:23PM ; [facility]  
  
  
  
                                Episodic mood disorders On Call with Pamela edwards CNP 2021  
  
  
  
                                                    Last Documented On   
1 7:49AM ; [facility]  
  
  
  
                                                    Mood disorders, NOS per tabatha  
ent   
reported history                         Established Patient with Leonie   
Short LISWS                             2021  
  
  
  
                                                    Last Documented On   
1 7:15PM ; [facility]  
  
  
  
                                        Post-traumatic stress disorder  Establ  
ished Patient with Leonie Short   
LISWS                                   2021  
  
  
  
                                                    Last Documented On   
1 7:15PM ; [facility]  
  
  
  
                                Morbid obesity  Medical Established Patient with  
 Doreen Deborah CNP 2021  
  
  
  
                                                    Last Documented On   
1 12:31PM ; [facility]  
  
  
  
                                Otitis externa  Medical Established Patient with  
 Doreen Deborah CNP 2021  
  
  
  
                                                    Last Documented On   
1 12:31PM ; [facility]  
  
  
  
                                                    Z68.42 - Body mass index [BM  
I]   
45.0-49.9, adult                        Medical Established Patient with   
Doreen Deborah CNP                        2021  
  
  
  
                                                    Last Documented On   
1 12:31PM ; [facility]  
  
  
  
                                        Post-traumatic stress disorder  Establ  
ished Patient with Leonie Short   
LISWS                                   06/10/2021  
  
  
  
                                                    Last Documented On 06/10/202  
1 10:05PM ; [facility]  
  
  
  
                                Morbid obesity  Medical Established Patient with  
 Doreen Deborah CNP 06/10/2021  
  
  
  
                                                    Last Documented On 06/10/202  
1 4:45PM ; [facility]  
  
  
  
                                                    Z68.42 - Body mass index [BM  
I]   
45.0-49.9, adult                        Medical Established Patient with   
Doreen Deborah CNP                        06/10/2021  
  
  
  
                                                    Last Documented On 06/10/202  
1 4:45PM ; [facility]  
  
  
  
                                        Post-traumatic stress disorder  Establ  
ished Patient with Leonie Short   
LISWS                                   2021  
  
  
  
                                                    Last Documented On   
1 11:59AM ; [facility]  
  
  
  
                                                    Assessment of visit for: bhargavi myles for   
human immunodeficiency virus            Medical New Patient with Doreen   
Deborah CNP                              2021  
  
  
  
                                                    Last Documented On   
1 4:06PM ; [facility]  
  
  
  
                                                    Diabetes Risk Test Score was  
 one score   
2021                               Medical New Patient with Doreen   
Deborah CNP                              2021  
  
  
  
                                                    Last Documented On   
1 4:06PM ; [facility]  
  
  
  
                                Hypertension    Medical New Patient with Doreen C  
cate CNP 2021  
  
  
  
                                                    Last Documented On   
1 4:06PM ; [facility]  
  
  
  
                                Morbid obesity  Medical New Patient with Doreen C  
cate CNP 2021  
  
  
  
                                                    Last Documented On   
1 4:06PM ; [facility]  
  
  
  
                                Post-traumatic stress disorder Medical New Patie  
nt with Doreen Deborah CNP   
2021  
  
  
  
                                                    Last Documented On   
1 4:06PM ; [facility]  
  
  
  
                                                    Z68.42 - Body mass index [BM  
I]   
45.0-49.9, adult                        Medical New Patient with Doreen   
Deborah CNP                              2021  
  
  
  
                                                    Last Documented On   
1 4:06PM ; [facility]  
Work Phone: [phone]- Evaluation note  
  
Includes: Assessments for all patient encounters  
  
  
  
                                Findings        Encounter       Date  
   
                                                    Attention-deficit hyperactiv  
ity   
disorder                                 Established Patient with Leonie   
Short LISWS                             2024  
  
  
  
                                                    Last Documented On   
4 10:10AM ; [facility]  
  
  
  
                                                    Bipolar I disorder, most rec  
ent   
episode, depressed - mild                Established Patient with Leonie   
Short LISWS                             2024  
  
  
  
                                                    Last Documented On   
4 10:10AM ; [facility]  
  
  
  
                                        Post-traumatic stress disorder BH Establ  
ished Patient with Leonie Short   
LISWS                                   2024  
  
  
  
                                                    Last Documented On   
4 10:10AM ; [facility]  
  
  
  
                                        [D64.9 - Anemia, unspecified] anemia Med  
ical Established Patient with   
Doreenpaola Cabrera CNP                        2024  
  
  
  
                                                    Last Documented On   
4 6:51PM ; [facility]  
  
  
  
                                                    [Z68.43 - Body mass index [B  
MI]   
50.0-59.9, adult] assessment of body   
mass index                              Medical Established Patient with   
Doreen Cabrera CNP                        2024  
  
  
  
                                                    Last Documented On   
4 6:51PM ; [facility]  
  
  
  
                                                    Bipolar affective disorder,   
current   
episode depressed, mild                  Established Patient with Leonie   
Short LISWS                             2024  
  
  
  
                                                    Last Documented On   
4 5:16PM ; [facility]  
  
  
  
                                        Post-traumatic stress disorder BH Establ  
ished Patient with Leonie Short   
LISWS                                   2024  
  
  
  
                                                    Last Documented On   
4 5:16PM ; [facility]  
  
  
  
                                                    Undifferentiated attention d  
eficit   
disorder                                 Established Patient with   
Leonie Short LISWS                     2024  
  
  
  
                                                    Last Documented On   
4 5:16PM ; [facility]  
  
  
  
                                        Visit for: screening for disorder  Est  
ablished Patient with Leonie   
Short LISWS                             2024  
  
  
  
                                                    Last Documented On   
4 5:16PM ; [facility]  
  
  
  
                                                    [Z68.43 - Body mass index [B  
MI]   
50.0-59.9, adult] assessment of body   
mass index                              Medical Established Patient with   
Doreen Cabrera CNP                        2024  
  
  
  
                                                    Last Documented On   
4 7:50PM ; [facility]  
  
  
  
                                        Attention-deficit hyperactivity disorder  
 Medical Established Patient with   
Doreenpaola Mccormacken CNP                        2024  
  
  
  
                                                    Last Documented On   
4 7:50PM ; [facility]  
  
  
  
                                                    Diabetes Risk Test Score was  
 three   
score 3/27/2024                         Medical Established Patient with Doreenpaola Cabrera CNP                              2024  
  
  
  
                                                    Last Documented On   
4 7:50PM ; [facility]  
  
  
  
                                        Visit for: screening for STD Medical Est  
ablished Patient with Doreen Cabrera   
CNP                                     2024  
  
  
  
                                                    Last Documented On   
4 7:50PM ; [facility]  
  
  
  
                                                    [Z68.32 - Body mass index [B  
MI]   
32.0-32.9, adult] assessment of body   
mass index                              Medical Established Patient with   
Doreen Cabrera CNP                        2023  
  
  
  
                                                    Last Documented On   
3 6:01PM ; [facility]  
  
  
  
                                        Borderline personality disorder Medical   
Established Patient with Doreen Cabrera CNP                              2023  
  
  
  
                                                    Last Documented On   
3 6:01PM ; [facility]  
  
  
  
                                        Screening for diabetes mellitus Medical   
Established Patient with Doreen Cabrera CNP                              2023  
  
  
  
                                                    Last Documented On   
3 6:01PM ; [facility]  
  
  
  
                                        Assessment of body mass index Medical Es  
tablished Patient with Doreen Cabrera CNP                              10/21/2022  
  
  
  
                                                    Last Documented On 10/21/202  
2 2:45PM ; [facility]  
  
  
  
                                                    Bipolar affective disorder,   
current   
episode manic                            Tele[facility] with Haylie   
Aguilar LPCC-S                        2022  
  
  
  
                                                    Last Documented On   
2 3:22PM ; [facility]  
  
  
  
                                                    Bipolar affective disorder,   
current   
episode manic                            Established Patient with Haylie   
Aguilar LPCC-S                        2022  
  
  
  
                                                    Last Documented On   
2 2:28PM ; [facility]  
  
  
  
                                                    Bipolar affective disorder,   
current   
episode depressed, severe with   
psychosis                                Telebehavioral Health with Haylie   
Aguilar LPCC-S                        2022  
  
  
  
                                                    Last Documented On   
2 3:29PM ; [facility]  
  
  
  
                                                    Assessment of body mass inde  
x [Body   
mass index [BMI] 50.0-59.9, adult]      Open Access - Established with Julieth   
Aliya CNP                               2022  
  
  
  
                                                    Last Documented On   
2 9:36AM ; [facility]  
  
  
  
                                                    Bipolar I disorder, most rec  
ent   
episode, manic                          Open Access - Established with Julieth   
Aliya CNP                               2022  
  
  
  
                                                    Last Documented On   
2 9:36AM ; [facility]  
  
  
  
                                        Post-traumatic stress disorder BH Establ  
ished Patient with Haylie Aguilar   
LPCC-S                                  2022  
  
  
  
                                                    Last Documented On   
2 3:20PM ; [facility]  
  
  
  
                                No cough        Medical Established Patient with  
 Doreen Deborah CNP 2022  
  
  
  
                                                    Last Documented On   
2 4:04PM ; [facility]  
  
  
  
                                                    Z68.43 - Body mass index [BM  
I]   
50.0-59.9, adult                        Medical Established Patient with   
Doreen Deborah CNP                        2022  
  
  
  
                                                    Last Documented On   
2 4:04PM ; [facility]  
  
  
  
                                                    Borderline personality disor  
danny Pt   
reported hx of sx/dx                     Established Patient with Haylie Aguilar LPCC-S                        2022  
  
  
  
                                                    Last Documented On   
2 4:08PM ; [facility]  
  
  
  
                                                    Assessment of body mass inde  
x [Body   
mass index [BMI] 50.0-59.9, adult]      Open Access - Established with Julieth Pisano CNP                               2022  
  
  
  
                                                    Last Documented On   
2 7:41PM ; [facility]  
  
  
  
                                                    Diabetes Risk Test Score was  
 three   
score 2022                         Open Access - Established with Julieth   
Aliya CNP                               2022  
  
  
  
                                                    Last Documented On   
2 7:41PM ; [facility]  
  
  
  
                                                    Bipolar I disorder, most rec  
ent   
episode, manic                           Established Patient with Avis   
Felder LPCC-S                          2021  
  
  
  
                                                    Last Documented On   
1 1:35AM ; [facility]  
  
  
  
                                        Borderline personality disorder  Estab  
lished Patient with Avis   
Felder LPCC-S                          2021  
  
  
  
                                                    Last Documented On   
1 1:35AM ; [facility]  
  
  
  
                                        Post-traumatic stress disorder  Establ  
ished Patient with Avis   
Felder LPCC-S                          2021  
  
  
  
                                                    Last Documented On   
1 1:35AM ; [facility]  
  
  
  
                                                    Assessment of visit for: scr  
eening for   
human immunodeficiency virus            Medical Established Patient with   
Doreenpaola Mccormacken CNP                        2021  
  
  
  
                                                    Last Documented On   
1 2:56PM ; [facility]  
  
  
  
                                Nicotine dependence Medical Established Patient   
with Doreen Deborah CNP 2021  
  
  
  
                                                    Last Documented On   
1 2:56PM ; [facility]  
  
  
  
                                Tachycardia     Medical Established Patient with  
 Doreen Deborah CNP 2021  
  
  
  
                                                    Last Documented On   
1 2:56PM ; [facility]  
  
  
  
                                                    Z68.43 - Body mass index [BM  
I]   
50.0-59.9, adult                        Medical Established Patient with   
Doreenpaola Cabrera CNP                        2021  
  
  
  
                                                    Last Documented On   
1 2:56PM ; [facility]  
  
  
  
                                                    Bipolar I disorder, most rec  
ent   
episode, manic                           Established Patient with Leonie   
Short LISWS                             2021  
  
  
  
                                                    Last Documented On   
1 10:17AM ; [facility]  
  
  
  
                                                    Borderline personality disor  
danny per   
patient reported history                 Established Patient with Leonie   
Short LISWS                             2021  
  
  
  
                                                    Last Documented On   
1 10:17AM ; [facility]  
  
  
  
                                Nicotine dependence  Established Patient with   
Leonie Short LISWS 2021  
  
  
  
                                                    Last Documented On   
1 10:17AM ; [facility]  
  
  
  
                                        Post-traumatic stress disorder  Establ  
ished Patient with Leonie Short   
LISWS                                   2021  
  
  
  
                                                    Last Documented On   
1 10:17AM ; [facility]  
  
  
  
                                Body mass index Medical Established Patient with  
 Doreen Deborah CNP 2021  
  
  
  
                                                    Last Documented On   
1 5:23PM ; [facility]  
  
  
  
                                Morbid obesity  Medical Established Patient with  
 Doreen Deborah CNP 2021  
  
  
  
                                                    Last Documented On   
1 5:23PM ; [facility]  
  
  
  
                                        Nicotine dependence uncomplicated Medica  
l Established Patient with Doreen   
Deborah CNP                              2021  
  
  
  
                                                    Last Documented On   
1 5:23PM ; [facility]  
  
  
  
                                                    Z68.42 - Body mass index [BM  
I]   
45.0-49.9, adult                        Medical Established Patient with   
Doreen Deborah CNP                        2021  
  
  
  
                                                    Last Documented On   
1 5:23PM ; [facility]  
  
  
  
                                Episodic mood disorders On Call with Pamela edwards CNP 2021  
  
  
  
                                                    Last Documented On   
1 7:49AM ; [facility]  
  
  
  
                                                    Mood disorders, NOS per tabatha  
ent   
reported history                         Established Patient with Leonie   
Short LISWS                             2021  
  
  
  
                                                    Last Documented On   
1 7:15PM ; [facility]  
  
  
  
                                        Post-traumatic stress disorder  Establ  
ished Patient with Leonie Short   
LISWS                                   2021  
  
  
  
                                                    Last Documented On   
1 7:15PM ; [facility]  
  
  
  
                                Morbid obesity  Medical Established Patient with  
 Doreen Deborah CNP 2021  
  
  
  
                                                    Last Documented On   
1 12:31PM ; [facility]  
  
  
  
                                Otitis externa  Medical Established Patient with  
 Doreen Deborah CNP 2021  
  
  
  
                                                    Last Documented On   
1 12:31PM ; [facility]  
  
  
  
                                                    Z68.42 - Body mass index [BM  
I]   
45.0-49.9, adult                        Medical Established Patient with   
Doreen Deborah CNP                        2021  
  
  
  
                                                    Last Documented On   
1 12:31PM ; [facility]  
  
  
  
                                        Post-traumatic stress disorder BH Establ  
ished Patient with Leonie Short   
LISWS                                   06/10/2021  
  
  
  
                                                    Last Documented On 06/10/202  
1 10:05PM ; [facility]  
  
  
  
                                Morbid obesity  Medical Established Patient with  
 Doreen Deborah CNP 06/10/2021  
  
  
  
                                                    Last Documented On 06/10/202  
1 4:45PM ; [facility]  
  
  
  
                                                    Z68.42 - Body mass index [BM  
I]   
45.0-49.9, adult                        Medical Established Patient with   
Doreen Deborah CNP                        06/10/2021  
  
  
  
                                                    Last Documented On 06/10/202  
1 4:45PM ; [facility]  
  
  
  
                                        Post-traumatic stress disorder  Establ  
ished Patient with Leonie Short   
LISWS                                   2021  
  
  
  
                                                    Last Documented On   
1 11:59AM ; [facility]  
  
  
  
                                                    Assessment of visit for: bhargavi guevaraning for   
human immunodeficiency virus            Medical New Patient with Doreenpaola Cabrera CNP                              2021  
  
  
  
                                                    Last Documented On   
1 4:06PM ; [facility]  
  
  
  
                                                    Diabetes Risk Test Score was  
 one score   
2021                               Medical New Patient with Doreen   
Deborah CNP                              2021  
  
  
  
                                                    Last Documented On   
1 4:06PM ; [facility]  
  
  
  
                                Hypertension    Medical New Patient with Doreen DAVID almonteen CNP 2021  
  
  
  
                                                    Last Documented On   
1 4:06PM ; [facility]  
  
  
  
                                Morbid obesity  Medical New Patient with Droeen C  
cate CNP 2021  
  
  
  
                                                    Last Documented On   
1 4:06PM ; [facility]  
  
  
  
                                Post-traumatic stress disorder Medical New Patie  
nt with Doreen Cabrera CNP   
2021  
  
  
  
                                                    Last Documented On   
1 4:06PM ; [facility]  
  
  
  
                                                    Z68.42 - Body mass index [BM  
I]   
45.0-49.9, adult                        Medical New Patient with Doreen Cabrera CNP                              2021  
  
  
  
                                                    Last Documented On   
1 4:06PM ; [facility]  
  
Health [facility]  
Work Phone: [phone]- History general Narrative - Reported  
  
Includes: Medical History in patient's chart  
  
  
  
                                        Description         Last Updated  
   
                                        POTS                2024  
  
  
  
                                                    Last Documented On   
4 6:51PM ; [facility]  
  
  
  
                                        0 previous live birth(s) 2024  
  
  
  
                                                    Last Documented On   
4 7:50PM ; [facility]  
  
  
  
                                        Previously pregnant 1 time(s) 2024  
  
  
  
                                                    Last Documented On   
4 7:50PM ; [facility]  
  
  
  
                                        Recent immunization for flu 2024  
  
  
  
                                                    Last Documented On   
4 7:50PM ; [facility]  
  
  
  
                                        Has sex without a condom 2022  
  
  
  
                                                    Last Documented On   
2 4:04PM ; [facility]  
  
  
  
                                        Not planning to have a baby in the next   
12 months 2022  
  
  
  
                                                    Last Documented On   
2 4:04PM ; [facility]  
  
  
  
                                        Partners sexually transmitted infection   
status known 2022  
  
  
  
                                                    Last Documented On   
2 4:04PM ; [facility]  
  
  
  
                                        No previous hospitalizations 2022  
  
  
  
                                                    Last Documented On   
2 4:04PM ; [facility]  
  
  
  
                                        Chronic illness     2021  
  
  
  
                                                    Last Documented On   
1 7:49AM ; [facility]  
  
  
  
                                        Exposure to COVID-19 2021  
  
  
  
                                                    Last Documented On   
1 12:31PM ; [facility]  
  
  
  
                                        History of gynecologic disorder 20  
21  
  
  
  
                                                    Last Documented On   
1 4:06PM ; [facility]  
  
  
  
                                        History of Polycystic Ovarian Syndrome (  
PCOS) 2021  
  
  
  
                                                    Last Documented On   
1 4:06PM ; [facility]  
  
  
  
                                        History of anxiety disorder NOS 20  
21  
  
  
  
                                                    Last Documented On   
1 4:06PM ; [facility]  
  
  
  
                                        History of migraine headache 2021  
  
  
  
                                                    Last Documented On   
1 4:06PM ; [facility]  
  
  
  
                                        History of psychiatric disorders biopola  
r disorder 2021  
  
  
  
                                                    Last Documented On   
1 4:06PM ; [facility]  
  
[facility]""  
Work Phone: [phone]- History general Narrative - Reported  
  
Includes: Medical History in patient's chart  
  
  
  
                                        Description         Last Updated  
   
                                        POTS                2024  
  
  
  
                                                    Last Documented On   
4 6:51PM ; [facility]  
  
  
  
                                        0 previous live birth(s) 2024  
  
  
  
                                                    Last Documented On   
4 7:50PM ; [facility]  
  
  
  
                                        Previously pregnant 1 time(s) 2024  
  
  
  
                                                    Last Documented On   
4 7:50PM ; [facility]  
  
  
  
                                        Recent immunization for flu 2024  
  
  
  
                                                    Last Documented On   
4 7:50PM ; [facility]  
  
  
  
                                        Has sex without a condom 2022  
  
  
  
                                                    Last Documented On   
2 4:04PM ; [facility]  
  
  
  
                                        Not planning to have a baby in the next   
12 months 2022  
  
  
  
                                                    Last Documented On   
2 4:04PM ; [facility]  
  
  
  
                                        Partners sexually transmitted infection   
status known 2022  
  
  
  
                                                    Last Documented On   
2 4:04PM ; [facility]  
  
  
  
                                        No previous hospitalizations 2022  
  
  
  
                                                    Last Documented On   
2 4:04PM ; [facility]  
  
  
  
                                        Chronic illness     2021  
  
  
  
                                                    Last Documented On   
1 7:49AM ; [facility]  
  
  
  
                                        Exposure to COVID-19 2021  
  
  
  
                                                    Last Documented On   
1 12:31PM ; [facility]  
  
  
  
                                        History of gynecologic disorder 20  
21  
  
  
  
                                                    Last Documented On   
1 4:06PM ; [facility]  
  
  
  
                                        History of Polycystic Ovarian Syndrome (  
PCOS) 2021  
  
  
  
                                                    Last Documented On   
1 4:06PM ; [facility]  
  
  
  
                                        History of anxiety disorder NOS 20  
21  
  
  
  
                                                    Last Documented On   
1 4:06PM ; [facility]  
  
  
  
                                        History of migraine headache 2021  
  
  
  
                                                    Last Documented On   
1 4:06PM ; [facility]  
  
  
  
                                        History of psychiatric disorders biopola  
r disorder 2021  
  
  
  
                                                    Last Documented On   
1 4:06PM ; [facility]""  
  
[facility]""  
Work Phone: [phone]- History general Narrative - Reported  
  
Includes: Medical History in patient's chart  
  
  
  
                                        Description         Last Updated  
   
                                        POTS                2024  
  
  
  
                                                    Last Documented On   
4 6:51PM ; [facility]  
  
  
  
                                        0 previous live birth(s) 2024  
  
  
  
                                                    Last Documented On   
4 7:50PM ; [facility]  
  
  
  
                                        Previously pregnant 1 time(s) 2024  
  
  
  
                                                    Last Documented On   
4 7:50PM ; [facility]  
  
  
  
                                        Recent immunization for flu 2024  
  
  
  
                                                    Last Documented On   
4 7:50PM ; [facility]  
  
  
  
                                        Has sex without a condom 2022  
  
  
  
                                                    Last Documented On   
2 4:04PM ; [facility]  
  
  
  
                                        Not planning to have a baby in the next   
12 months 2022  
  
  
  
                                                    Last Documented On   
2 4:04PM ; [facility]  
  
  
  
                                        Partners sexually transmitted infection   
status known 2022  
  
  
  
                                                    Last Documented On   
2 4:04PM ; [facility]  
  
  
  
                                        No previous hospitalizations 2022  
  
  
  
                                                    Last Documented On   
2 4:04PM ; [facility]  
  
  
  
                                        Chronic illness     2021  
  
  
  
                                                    Last Documented On   
1 7:49AM ; [facility]  
  
  
  
                                        Exposure to COVID-19 2021  
  
  
  
                                                    Last Documented On   
1 12:31PM ; [facility]  
  
  
  
                                        History of gynecologic disorder 20  
21  
  
  
  
                                                    Last Documented On   
1 4:06PM ; [facility]  
  
  
  
                                        History of Polycystic Ovarian Syndrome (  
PCOS) 2021  
  
  
  
                                                    Last Documented On   
1 4:06PM ; [facility]  
  
  
  
                                        History of anxiety disorder NOS 20  
21  
  
  
  
                                                    Last Documented On   
1 4:06PM ; [facility]  
  
  
  
                                        History of migraine headache 2021  
  
  
  
                                                    Last Documented On   
1 4:06PM ; [facility]  
  
  
  
                                        History of psychiatric disorders biopola  
r disorder 2021  
  
  
  
                                                    Last Documented On   
1 4:06PM ; [facility]""  
  
[facility]""  
Work Phone: [phone]- History general Narrative - Reported  
  
Includes: Medical History in patient's chart  
  
  
  
                                        Description         Last Updated  
   
                                        POTS                2024  
  
  
  
                                                    Last Documented On   
4 6:51PM ; [facility]  
  
  
  
                                        0 previous live birth(s) 2024  
  
  
  
                                                    Last Documented On   
4 7:50PM ; [facility]  
  
  
  
                                        Previously pregnant 1 time(s) 2024  
  
  
  
                                                    Last Documented On   
4 7:50PM ; [facility]  
  
  
  
                                        Recent immunization for flu 2024  
  
  
  
                                                    Last Documented On   
4 7:50PM ; [facility]  
  
  
  
                                        Has sex without a condom 2022  
  
  
  
                                                    Last Documented On   
2 4:04PM ; [facility]  
  
  
  
                                        Not planning to have a baby in the next   
12 months 2022  
  
  
  
                                                    Last Documented On   
2 4:04PM ; [facility]  
  
  
  
                                        Partners sexually transmitted infection   
status known 2022  
  
  
  
                                                    Last Documented On   
2 4:04PM ; [facility]  
  
  
  
                                        No previous hospitalizations 2022  
  
  
  
                                                    Last Documented On   
2 4:04PM ; [facility]  
  
  
  
                                        Chronic illness     2021  
  
  
  
                                                    Last Documented On   
1 7:49AM ; [facility]  
  
  
  
                                        Exposure to COVID-19 2021  
  
  
  
                                                    Last Documented On   
1 12:31PM ; [facility]  
  
  
  
                                        History of gynecologic disorder 20  
21  
  
  
  
                                                    Last Documented On   
1 4:06PM ; [facility]  
  
  
  
                                        History of Polycystic Ovarian Syndrome (  
PCOS) 2021  
  
  
  
                                                    Last Documented On   
1 4:06PM ; [facility]  
  
  
  
                                        History of anxiety disorder NOS 20  
21  
  
  
  
                                                    Last Documented On   
1 4:06PM ; [facility]  
  
  
  
                                        History of migraine headache 2021  
  
  
  
                                                    Last Documented On   
1 4:06PM ; [facility]  
  
  
  
                                        History of psychiatric disorders biopola  
r disorder 2021  
  
  
  
                                                    Last Documented On   
1 4:06PM ; [facility]  
Work Phone: [phone]- Progress note*   
  
Progress note  
  
  
  
                                Date            Encounter       Last Documented   
by  
   
                                2024      Medical Established Patient Last  
 documented on 2024; 6:51 PM,   
Doreen Cabrera CNP; [facility]  
  
  
  
                                                      
  
  
** Active Problems & Conditions **  
- F90.9 - Attention-deficit Hyperactivity Disorder  
- F31.31 - Bipolar I Disorder, Most Recent Episode, Depressed Mild  
- F43.10 - Post-traumatic Stress Disorder  
  
** Chief Complaint **  
The Chief Complaint is: Patient is not seeing [facility] any longer and wants to
   
discuss metformin. Patient is still taking med, but it is still getting diarrhea
 and   
nausea. Patient is wanting to get into [facility]. She has tried to contact Dr. Patterson 
in   
[place], but has not heard back. Patient provided with phone number for [place]   
[facility].  
Patient here for 2 month med check. Patient is taking meds as prescribed. 
Patient is   
still feeling anxiousness at times.  
Patient refused HPV and PCV.  
  
** Referred Here **  
Not referred by urgent care clinic and not the emergency room. No prior 
encounters.  
- Data to be reviewed: no clinical lab tests  
  
** History of Present Illness **  
Linnette Beckham is a 24 year old female.  
- Allergy list reviewed - Reviewed Medications - Medication list reviewed  
- Date of last menstruation 2024 - Pregnancy test - would not like a 
pregnancy   
test  
Presents with sig other  Tyesha  for med f/u . wants to get a new gyn . reports 
no   
longer doing counseling r/t  she said I dont need it  labs reviewed from last 
month   
ER visit and I explained her cbc shows anemia , likely r/t heavy periods . I 
would   
like them to get further labs as pt does admit to fatigue / sleeping 14 hours 
per day  
  
** Current Medication **  
- ARIPiprazole 5 MG Oral Tablet take 1 tablet by mouth once daily, 30 days, 5 
refills  
- busPIRone HCl 5 MG Oral Tablet take 1 tablet by mouth twice daily, 30 days, 5   
refills  
- Intuniv 1 MG Oral Tablet Extended Release 24 Hour take 1 tablet by mouth once 
daily   
at bedtime, 30 days, 5 refills  
- lamoTRIgine 100 MG Oral Tablet take 1 tablet by mouth once daily, 30 days, 5   
refills  
- MiraLax 17 GM/SCOOP Oral Powder mix 1 scoop with 8 ounces once daily, 30 days,
 2   
refills  
- Narcan 4 MG/0.1ML Nasal Liquid spray in nostril for symptoms of overdose , may
   
repeat in 2 minutes if symptoms persist, 1 days, 0 refills  
- Prazosin HCl 1 MG Oral Capsule take 1 capsule by mouth twice daily, 30 days, 5
   
refills  
- Sertraline HCl 50 MG Oral Tablet take 1 tablet by mouth once daily, 30 days, 5
   
refills  
- traZODone HCl 100 MG Oral Tablet take 1 tablet by mouth twice daily, 30 days, 
5   
refills  
  
** Past Medical/Surgical History **  
Reported:  
Has sex without a condom.  
Medical: No previous hospitalizations. Chronic illness and Sexually transmitted   
infection Partners sexually transmitted infection status known.  
Immunization History: Recent immunization for flu.  
Exposure: Exposure to COVID-19.  
Pregnancy: Previously pregnant 1 time(s) and para having 0 live birth(s). Not   
planning a pregnancy in the next year.  
Diagnoses:  
Polycystic Ovarian Syndrome (PCOS).  
Migraine headache.  
Psychiatric disorders biopolar disorder  
Anxiety disorder NOS  
POTS.  
Procedural:  
- Insertion of ear pressure equalization tubes in both ears  
Surgical:  
- Tonsillectomy  
- Tonsillectomy with adenoidectomy  
  
** Social History **  
Environmental Exposure: Secondhand cigarette smoke exposure.  
Behavioral: Not a current tobacco user.  
Tobacco use: Using electronic cigarettes/vaping.  
Alcohol: Not using alcohol.  
Drug Use: Not using drugs denied by patient.  
Sexual: Sexually active age of sexual partner is _____ years old 21, sexual   
orientation Lesbian or David, gender identity Other, and birth control is being   
practiced Not Using - In Same Sex Relationship.  
  
** Allergies **  
- Abilify Reaction: groggy  
- Augmentin Reaction: Shock  
- Metformin Reaction: Nausea, Vomiting  
- NO KNOWN ENVIRONMENTAL ALLERGIES  
- NO KNOWN FOOD ALLERGIES  
- Sertraline Reaction: slow/groggy  
- Trazodone Hydrochloride Reaction: Panic attack  
  
** Family History **  
Paternal:  
Systemic hypertension  
Oncologic disorder  
Maternal:  
Systemic hypertension  
Epilepsy and recurrent seizures  
Psychiatric disorders  
Oncologic disorder  
Fraternal:  
Psychiatric disorders  
  
** Review Of Systems **  
Head: No head symptoms.  
Neck: No neck symptoms.  
Eyes: No eye symptoms.  
Otolaryngeal: No ear symptoms, no nasal symptoms, no nose and sinus finding, no   
throat symptoms, no oral cavity symptoms, and no jaw symptoms.  
Breasts: No breast symptoms.  
Cardiovascular: No cardiovascular symptoms and no chest pain or discomfort.  
Pulmonary: No pulmonary symptoms.  
Gastrointestinal: No gastrointestinal symptoms.  
Genitourinary: No genitourinary symptoms. Obstetrics and gynecology: heavy 
periods.  
Musculoskeletal: No musculoskeletal symptoms.  
Neurological: No neurological symptoms.  
Psychological: No psychological symptoms.  
Skin: No skin symptoms.  
Cardiovascular: Edema not present.  
  
** Physical Findings **  
- Vitals taken 2024 04:39 pm  
BP-Sitting L161/94 mmHg  
BP Cuff SizeLarge  
Pulse Rate-Itsqlkw90 bpm  
Hyxiya17 in  
Fhldig627 lbs  
Body Mass Index54.6 kg/m2  
Body Surface Area2.4 m2  
Oxygen Nhfaxgvyrj39 %  
  
- Vitals taken 2024 04:50 pm  
BP-Sitting L137/89 mmHg  
BP Cuff SizeLarge  
Pulse Rate-Eutbmdg62 bpm  
  
Vital Signs:  
- Systolic blood pressure 130 - 139 mmHg.  
- Diastolic blood pressure 80-89 mmHg.  
General Appearance:  
- Awake. - Alert. - Well developed. - Well nourished. - In no acute distress.  
Lungs:  
- Respiration rhythm and depth was normal. - Clear to auscultation.  
Cardiovascular:  
Heart Rate And Rhythm: - Normal.  
Heart Sounds: - Normal.  
Murmurs: - No murmurs were heard.  
Abdomen:  
Visual Inspection: - Abdomen was normal on visual inspection.  
Musculoskeletal System:  
General/bilateral: - Normal movement of all extremities.  
Skin:  
- General appearance was normal.  
  
** Tests **  
Laboratory-based Chemistry:  
Other Laboratory Tests:  
Screening for sexually transmitted infections was not performed.  
  
** Assessment **  
- Z68.43 - Body mass index [BMI] 50.0-59.9, adult  
- D64.9 - Anemia, unspecified  
  
** Counseling/Education **  
- Does not want to stop using current contraception  
- Wishing to stop using electronic cigarettes/vaping  
- Discussed nutritional needs teach healthy choices including fruits and 
vegetables  
- Patient education about a proper diet  
- Not requesting contraception  
- Discussed concerns about exercise: promote physical activity  
  
** Plan **  
StartCited- Anemia, unspecified  
Lab: Iron and TIBC  
Lab: Ferritin  
Lab: CBC With Differential/Platelet  
Lab: TSH+T4F+T3Free  
Lab: Thyroid Antibodies  
EndCited  
StartCited- Other  
Follow-up Appointment  
2 month med check  
EndCited  
# given to [facility] , call michael quirino appt asap . labs asap to further 
check   
on anemia.  
  
** Care Team **  
- Doreen Cbarera CNP  
  
** Health Reminders **  
- Assess BMI satisfied 2024.  
- Assess Tobacco Use satisfied 2024.  
- Follow Up Plan BMI Management satisfied 2024.  
  
** User Defined 1 **  
Not planning a pregnancy in the next year.  
  
  
[facility]06- Progress note*   
  
Progress note  
  
  
  
                                Date            Encounter       Last Documented   
by  
   
                                2024       Established Patient Last docu  
mented on 2024; 10:10 AM,   
Leonie HUTCHINSON; [facility]  
  
  
  
                                                      
  
  
** Active Problems & Conditions **  
- F90.9 - Attention-deficit Hyperactivity Disorder  
- F31.31 - Bipolar I Disorder, Most Recent Episode, Depressed Mild  
- F43.10 - Post-traumatic Stress Disorder  
  
** Chief Complaint **  
The Chief Complaint is: [facility] met with patient to follow-up regarding mood and PHQ
   
score of 2 and ESTHELA score of 6. Patient reports noticing increased anxiety,   
overthinking and catastrophizing recently. Patient reports in the past, summers 
have   
been difficult and noticed a decline in mood around this time of year. Patient   
reports having several positive things ongoing in life at this time. Patient 
reports   
stressors related to physical health. Patient reports no thoughts of harm toward
 self   
or others.  
  
** History of Present Illness **  
Linnette Beckham is a 24 year old female.  
- Normal appetite - Irritability  
- Anxiety with persistent worry - With anticipation of misfortune to self or 
others -   
Sleep disturbances as patient reports sleeping too much (around 14-16 hours) - 
Energy   
level is fair - Feeling guilty - Racing thoughts - No depression - No loss of   
interest in activities - Not easily distracted - No social isolation - No 
impulsive   
behavior  
  
** Current Medication **  
- ARIPiprazole 5 MG Oral Tablet take 1 tablet by mouth once daily, 30 days, 5 
refills  
- busPIRone HCl 5 MG Oral Tablet take 1 tablet by mouth twice daily, 30 days, 5   
refills  
- Intuniv 1 MG Oral Tablet Extended Release 24 Hour take 1 tablet by mouth once 
daily   
at bedtime, 30 days, 5 refills  
- lamoTRIgine 100 MG Oral Tablet take 1 tablet by mouth once daily, 30 days, 5   
refills  
- MiraLax 17 GM/SCOOP Oral Powder mix 1 scoop with 8 ounces once daily, 30 days,
 2   
refills  
- Narcan 4 MG/0.1ML Nasal Liquid spray in nostril for symptoms of overdose , may
   
repeat in 2 minutes if symptoms persist, 1 days, 0 refills  
- Prazosin HCl 1 MG Oral Capsule take 1 capsule by mouth twice daily, 30 days, 5
   
refills  
- Sertraline HCl 50 MG Oral Tablet take 1 tablet by mouth once daily, 30 days, 5
   
refills  
- traZODone HCl 100 MG Oral Tablet take 1 tablet by mouth twice daily, 30 days, 
5   
refills  
  
** Social History **  
Medical: Chronic illness with POTS and PCOS.  
Environmental Exposure: No secondhand cigarette smoke exposure.  
Personal: Family problems and recent emotional stress.  
Behavioral: Not a current tobacco user.  
Tobacco use: Using electronic cigarettes/vaping.  
Alcohol: Not using alcohol.  
Drug Use: Not using drugs.  
  
** Physical Findings **  
General Appearance:  
- Normal Appearance.  
Neurological:  
- Estimated intelligence was normal. - Oriented to time, place, and person. - No
   
hallucinations. - Judgement was not impaired.  
Speech: - Is Normal.  
Psychiatric:  
- Mood is Euthymic. - Attitude Open.  
Demonstrated Behavior: - Motor Activity Normal Activity. - Eye Contact 
Appropriate.  
Affect: - Congruent with the mood.  
Thought Content: - Insight was intact. - No delusions. - No suicidal ideation. -
 No   
Passive thoughts of Death. - No suicidal plans. - No suicidal intent. - No 
homicidal   
ideations. - No homicidal plans. - No homicidal intent.  
Past Medical:  
- No repetitive self injurious behavior.  
  
** Assessment **  
- F90.9 - Attention-deficit hyperactivity disorder, unspecified type  
- F31.31 - Bipolar disorder, current episode depressed, mild  
- F43.10 - Post-traumatic stress disorder, unspecified  
  
** Therapy **  
- Developmental/Behavioral Screening & Testing - PHQ9 and 
Developmental/Behavioral   
Screening & Testing - GAD7.  
- Brief solution-focused therapy.  
- Adherent with medications.  
-  Visit 30 Minutes.  
- Plan - do not modify medication at this time. Patient would like to see if   
addressing physical health concerns helps before changing medications. 
Collaborated   
with patient and provider:  
  
** Counseling/Education **  
BHP offered active and supportive listening and processed current stressors.  
BHP discussed coping skills and supports that can be implemented to manage 
increased   
anxiety and stressors. BHP discussed potential of resuming counseling, even if 
for   
monthly visits to process ongoing stressors.  
BHP encouraged patient to follow-up on referrals as discussed with PCP.  
  
** Plan **  
P to attempt to follow-up with patient via phone in 2 weeks and at next in 
person   
visit as scheduled.  
  
** Care Team **  
- Doreen Cabrera CNP  
  
** Health Reminders **  
- Assess Tobacco Use satisfied 2024.  
- ESTHELA-2 satisfied 2024.  
- PHQ9 / PHQA satisfied 2024.  
  
** User Defined 1 **  
ESTHELA-2 score was three 2024, ESTHELA-7 score was six [ESTHELA-7] Feeling nervous, 
anxious   
or on edge? + 2 pt : More than half the days, [ESTHELA-7] Feeling nervous, anxious 
or on   
edge? + 2 pt : More than half the days, [ESTHELA-7] Not being able to stop or 
control   
worrying? + 1 pt : Several days, [ESTHELA-7] Not being able to stop or control 
worrying?   
+ 1 pt : Several days, [ESTHELA-7] Worrying too much about different things? + 1 pt 
:   
Several days, [ESTHELA-7] Trouble relaxing? + 0 pt : Not at all, [ESTHELA-7] Being so   
restless that it's hard to sit still? + 0 pt : Not at all, [ESTHELA-7] Becoming 
easily   
annoyed or irritable? + 1 pt : Several days, [ESTHELA-7] Feeling afraid as if 
something   
awful might happen? + 1 pt : Several days, Patient Health Questionnaire 9-Item 
total   
score was two 2024 If you checked off problems, how difficult is it for you
to   
do your work? + : Somewhat difficult, [PHQ-9-1] Little interest or pleasure in 
doing   
things? + 0 pt : Not at all, [PHQ-9-2] Feeling down, depressed, or hopeless? + 0
 pt :   
Not at all, [PHQ-9-3] Trouble falling or staying asleep or sleeping too much? + 
1 pt   
: Several days, [PHQ-9-4] Feeling tired or having little energy? + 0 pt : Not at
 all,   
[PHQ-9-5] Poor appetite or overeating? + 0 pt : Not at all, [PHQ-9-6] Feeling 
bad   
about yourself-or that you are a failure + 1 pt : Several days, [PHQ-9-7] 
Trouble   
concentrating on things such as reading the newspaper + 0 pt : Not at all, 
[PHQ-9-8]   
Moving or speaking so slowly that other people have noticed. + 0 pt : Not at 
all,   
[PHQ-9-9] Thoughts that you would be better off dead or hurting yourself? + 0 pt
 :   
Not at all, and ESTHELA If you checked off problems, how difficult is it for you to 
do   
your work, take care of things, or get along? Somewhat difficult.  
  
  
[facility]04- Progress note*   
  
Progress note  
  
  
  
                                Date            Encounter       Last Documented   
by  
   
                                2024      Chart Update    Last documented   
on 2024; 9:32 AM, Doreen Cabrera CNP;   
[facility]  
  
  
  
                                                      
  
  
** Active Problems & Conditions **  
- F90.9 - Attention-deficit Hyperactivity Disorder  
- F31.31 - Bipolar I Disorder, Most Recent Episode, Depressed Mild  
- F43.10 - Post-traumatic Stress Disorder  
  
** Current Medication **  
- ARIPiprazole 5 MG Oral Tablet take 1 tablet by mouth once daily, 30 days, 5 
refills  
- busPIRone HCl 5 MG Oral Tablet take 1 tablet by mouth twice daily, 30 days, 5   
refills  
- Intuniv 1 MG Oral Tablet Extended Release 24 Hour take 1 tablet by mouth once 
daily   
at bedtime, 30 days, 5 refills  
- lamoTRIgine 100 MG Oral Tablet take 1 tablet by mouth once daily, 30 days, 5   
refills  
- metFORMIN HCl 500 MG Oral Tablet AM and PM per GYN/NOMS in [place], 30 days, 0
   
refills  
- MiraLax 17 GM/SCOOP Oral Powder mix 1 scoop with 8 ounces once daily, 30 days,
 2   
refills  
- Narcan 4 MG/0.1ML Nasal Liquid spray in nostril for symptoms of overdose , may
   
repeat in 2 minutes if symptoms persist, 1 days, 0 refills  
- Prazosin HCl 1 MG Oral Capsule take 1 capsule by mouth twice daily, 30 days, 5
   
refills  
- Sertraline HCl 50 MG Oral Tablet take 1 tablet by mouth once daily, 30 days, 5
   
refills  
- traZODone HCl 100 MG Oral Tablet take 1 tablet by mouth twice daily, 30 days, 
5   
refills  
  
** Past Medical/Surgical History **  
Reported:  
Has sex without a condom.  
Medical: No previous hospitalizations. Chronic illness and Sexually transmitted   
infection Partners sexually transmitted infection status known.  
Immunization History: Recent immunization for flu.  
Exposure: Exposure to COVID-19.  
Pregnancy: Previously pregnant 1 time(s) and para having 0 live birth(s). Not   
planning a pregnancy in the next year.  
Diagnoses:  
Polycystic Ovarian Syndrome (PCOS).  
Migraine headache.  
Psychiatric disorders biopolar disorder  
Anxiety disorder NOS  
Procedural:  
- Insertion of ear pressure equalization tubes in both ears  
Surgical:  
- Tonsillectomy  
- Tonsillectomy with adenoidectomy  
  
** Allergies **  
- Abilify Reaction: groggy  
- Augmentin Reaction: Shock  
- metformin Reaction: Nausea, Vomiting  
- trazodone Reaction: Panic attack  
- Zoloft Reaction: slow/ groggy  
  
** Family History **  
Paternal:  
Systemic hypertension  
Oncologic disorder  
Maternal:  
Systemic hypertension  
Epilepsy and recurrent seizures  
Psychiatric disorders  
Oncologic disorder  
Fraternal:  
Psychiatric disorders  
  
** Tests **  
Physician's Services:  
- Outside Chlamydia Test was Negative 3/27/2024  
  
** Care Team **  
- Doreen Cabrera CNP  
  
  
[facility]03- Evaluation note  
  
Includes: Assessments for all patient encounters  
  
  
  
                                Findings        Encounter       Date  
   
                                                    Bipolar affective disorder,   
current   
episode depressed, mild                  Established Patient with Leonie   
Short [facility]                             2024  
  
  
  
                                                    Last Documented On   
4 7:46PM ; [facility]  
  
  
  
                                        Post-traumatic stress disorder  Establ  
ished Patient with Leonie Short   
LISWS                                   2024  
  
  
  
                                                    Last Documented On   
4 7:46PM ; [facility]  
  
  
  
                                                    Undifferentiated attention d  
eficit   
disorder                                 Established Patient with   
Leonie Short [facility]                     2024  
  
  
  
                                                    Last Documented On   
4 7:46PM ; [facility]  
  
  
  
                                        Visit for: screening for disorder BH Est  
ablished Patient with Leonie   
Short [facility]                             2024  
  
  
  
                                                    Last Documented On   
4 7:46PM ; [facility]  
  
  
  
                                                    [Z68.43 - Body mass index [B  
MI]   
50.0-59.9, adult] assessment of body   
mass index                              Medical Established Patient with   
Doreen Cabrera CNP                        2024  
  
  
  
                                                    Last Documented On   
4 7:50PM ; [facility]  
  
  
  
                                        Attention-deficit hyperactivity disorder  
 Medical Established Patient with   
Doreen Cabrera CNP                        2024  
  
  
  
                                                    Last Documented On   
4 7:50PM ; [facility]  
  
  
  
                                                    Diabetes Risk Test Score was  
 three   
score 3/27/2024                         Medical Established Patient with Doreen Cabrera CNP                              2024  
  
  
  
                                                    Last Documented On   
4 7:50PM ; [facility]  
  
  
  
                                        Visit for: screening for STD Medical Est  
ablished Patient with Doreen Cabrera   
CNP                                     2024  
  
  
  
                                                    Last Documented On   
4 7:50PM ; [facility]  
  
  
  
                                                    [Z68.32 - Body mass index [B  
MI]   
32.0-32.9, adult] assessment of body   
mass index                              Medical Established Patient with   
Doreen Cabrera CNP                        2023  
  
  
  
                                                    Last Documented On   
3 6:01PM ; [facility]  
  
  
  
                                        Borderline personality disorder Medical   
Established Patient with Doreen Cabrera CNP                              2023  
  
  
  
                                                    Last Documented On   
3 6:01PM ; [facility]  
  
  
  
                                        Screening for diabetes mellitus Medical   
Established Patient with Doreen Cabrera CNP                              2023  
  
  
  
                                                    Last Documented On   
3 6:01PM ; [facility]  
  
  
  
                                        Assessment of body mass index Medical Es  
tablished Patient with Doreen Cabrera CNP                              10/21/2022  
  
  
  
                                                    Last Documented On 10/21/202  
2 2:45PM ; [facility]  
  
  
  
                                                    Bipolar affective disorder,   
current   
episode manic                            [facility]                        20[facility]  
  
  
  
                                                    Last Documented On   
2 3:22PM ; [facility]  
  
  
  
                                                    Bipolar affective disorder,   
current   
episode manic                           BH Established Patient with Haylie Aguilar [facility]                        2022  
  
  
  
                                                    Last Documented On   
2 2:28PM ; [facility]  
  
  
  
                                                    Bipolar affective disorder,   
current   
episode depressed, severe with   
psychosis                                [facility] with Haylienicanor Aguilar [facility]                        2022  
  
  
  
                                                    Last Documented On   
2 3:29PM ; [facility]  
  
  
  
                                                    Assessment of body mass inde  
x [Body   
mass index [BMI] 50.0-59.9, adult]      Open Access - Established with Julieth   
Aliya CNP                               2022  
  
  
  
                                                    Last Documented On   
2 9:36AM ; [facility]  
  
  
  
                                                    Bipolar I disorder, most rec  
ent   
episode, manic                          Open Access - Established with Julieth   
Aliya CNP                               2022  
  
  
  
                                                    Last Documented On   
2 9:36AM ; [facility]  
  
  
  
                                        Post-traumatic stress disorder  Establ  
ished Patient with Haylienicanor Aguilar   
[facility]                                  2022  
  
  
  
                                                    Last Documented On   
2 3:20PM ; [facility]  
  
  
  
                                No cough        Medical Established Patient with  
 Doreen Deborah CNP 2022  
  
  
  
                                                    Last Documented On   
2 4:04PM ; [facility]  
  
  
  
                                                    Z68.43 - Body mass index [BM  
I]   
50.0-59.9, adult                        Medical Established Patient with   
Doreen Deborah CNP                        2022  
  
  
  
                                                    Last Documented On   
2 4:04PM ; [facility]  
  
  
  
                                                    Borderline personality disor  
danny Pt   
reported hx of sx/dx                     Established Patient with Haylienicanor Aguilar [facility]                        2022  
  
  
  
                                                    Last Documented On   
2 4:08PM ; [facility]  
  
  
  
                                                    Assessment of body mass inde  
x [Body   
mass index [BMI] 50.0-59.9, adult]      Open Access - Established with Julieth   
Aliya CNP                               2022  
  
  
  
                                                    Last Documented On   
2 7:41PM ; [facility]  
  
  
  
                                                    Diabetes Risk Test Score was  
 three   
score 2022                         Open Access - Established with Julieth   
Aliya CNP                               2022  
  
  
  
                                                    Last Documented On   
2 7:41PM ; [facility]  
  
  
  
                                                    Bipolar I disorder, most rec  
ent   
episode, manic                           Established Patient with Avis Felder [facility]                          2021  
  
  
  
                                                    Last Documented On   
1 1:35AM ; [facility]  
  
  
  
                                        Borderline personality disorder  Estab  
lished Patient with Avis   
Felder [facility]                          2021  
  
  
  
                                                    Last Documented On   
1 1:35AM ; [facility]  
  
  
  
                                        Post-traumatic stress disorder  Establ  
ished Patient with Avis   
Felder [facility]                          2021  
  
  
  
                                                    Last Documented On   
1 1:35AM ; [facility]  
  
  
  
                                                    Assessment of visit for: bhargavi guevaraning for   
human immunodeficiency virus            Medical Established Patient with   
Doreen Deborah CNP                        2021  
  
  
  
                                                    Last Documented On   
1 2:56PM ; [facility]  
  
  
  
                                Nicotine dependence Medical Established Patient   
with Doreen Deborah CNP 2021  
  
  
  
                                                    Last Documented On   
1 2:56PM ; [facility]  
  
  
  
                                Tachycardia     Medical Established Patient with  
 Doreen Deborah CNP 2021  
  
  
  
                                                    Last Documented On   
1 2:56PM ; [facility]  
  
  
  
                                                    Z68.43 - Body mass index [BM  
I]   
50.0-59.9, adult                        Medical Established Patient with   
Doreen Deborah CNP                        2021  
  
  
  
                                                    Last Documented On   
1 2:56PM ; [facility]  
  
  
  
                                                    Bipolar I disorder, most rec  
ent   
episode, manic                           Established Patient with Leonie   
Short [facility]                             2021  
  
  
  
                                                    Last Documented On   
1 10:17AM ; [facility]  
  
  
  
                                                    Borderline personality disor  
danny per   
patient reported history                 Established Patient with Leonie   
Short [facility]                             2021  
  
  
  
                                                    Last Documented On   
1 10:17AM ; [facility]  
  
  
  
                                Nicotine dependence  Established Patient with   
Leonie Short [facility] 2021  
  
  
  
                                                    Last Documented On   
1 10:17AM ; [facility]  
  
  
  
                                        Post-traumatic stress disorder  Establ  
ished Patient with Leonie Short   
LISWS                                   2021  
  
  
  
                                                    Last Documented On   
1 10:17AM ; [facility]  
  
  
  
                                Body mass index Medical Established Patient with  
 Doreen Deborah CNP 2021  
  
  
  
                                                    Last Documented On   
1 5:23PM ; [facility]  
  
  
  
                                Morbid obesity  Medical Established Patient with  
 Doreen Deborah CNP 2021  
  
  
  
                                                    Last Documented On   
1 5:23PM ; [facility]  
  
  
  
                                        Nicotine dependence uncomplicated Medica  
l Established Patient with Doreen   
Deborah CNP                              2021  
  
  
  
                                                    Last Documented On   
1 5:23PM ; [facility]  
  
  
  
                                                    Z68.42 - Body mass index [BM  
I]   
45.0-49.9, adult                        Medical Established Patient with   
Doreen Deborah CNP                        2021  
  
  
  
                                                    Last Documented On   
1 5:23PM ; [facility]  
  
  
  
                                Episodic mood disorders On Call with Pamela Velezammy edwards CNP 2021  
  
  
  
                                                    Last Documented On   
1 7:49AM ; [facility]  
  
  
  
                                                    Mood disorders, NOS per tabatha  
ent   
reported history                         Established Patient with Leonie   
Short LISWS                             2021  
  
  
  
                                                    Last Documented On   
1 7:15PM ; [facility]  
  
  
  
                                        Post-traumatic stress disorder  Establ  
ished Patient with Leonie Short   
LISWS                                   2021  
  
  
  
                                                    Last Documented On   
1 7:15PM ; [facility]  
  
  
  
                                Morbid obesity  Medical Established Patient with  
 Doreen Deborah CNP 2021  
  
  
  
                                                    Last Documented On   
1 12:31PM ; [facility]  
  
  
  
                                Otitis externa  Medical Established Patient with  
 Doreen Deborah CNP 2021  
  
  
  
                                                    Last Documented On   
1 12:31PM ; [facility]  
  
  
  
                                                    Z68.42 - Body mass index [BM  
I]   
45.0-49.9, adult                        Medical Established Patient with   
Doreen Deborah CNP                        2021  
  
  
  
                                                    Last Documented On   
1 12:31PM ; [facility]  
  
  
  
                                        Post-traumatic stress disorder  Establ  
ished Patient with Leonie Short   
LISWS                                   06/10/2021  
  
  
  
                                                    Last Documented On 06/10/202  
1 10:05PM ; [facility]  
  
  
  
                                Morbid obesity  Medical Established Patient with  
 Doreen Deborah CNP 06/10/2021  
  
  
  
                                                    Last Documented On 06/10/202  
1 4:45PM ; [facility]  
  
  
  
                                                    Z68.42 - Body mass index [BM  
I]   
45.0-49.9, adult                        Medical Established Patient with   
Doreen Deborah CNP                        06/10/2021  
  
  
  
                                                    Last Documented On 06/10/202  
1 4:45PM ; [facility]  
  
  
  
                                        Post-traumatic stress disorder  Establ  
ished Patient with Leonie Short   
LISWS                                   2021  
  
  
  
                                                    Last Documented On   
1 11:59AM ; [facility]  
  
  
  
                                                    Assessment of visit for: scr  
eening for   
human immunodeficiency virus            Medical New Patient with Doreen Cabrera CNP                              2021  
  
  
  
                                                    Last Documented On   
1 4:06PM ; [facility]  
  
  
  
                                                    Diabetes Risk Test Score was  
 one score   
2021                               Medical New Patient with Doreen Cabrera CNP                              2021  
  
  
  
                                                    Last Documented On   
1 4:06PM ; [facility]  
  
  
  
                                Hypertension    Medical New Patient with Doreenpaola esposito CNP 2021  
  
  
  
                                                    Last Documented On   
1 4:06PM ; [facility]  
  
  
  
                                Morbid obesity  Medical New Patient with Doreen DAVID esposito CNP 2021  
  
  
  
                                                    Last Documented On   
1 4:06PM ; [facility]  
  
  
  
                                Post-traumatic stress disorder Medical New Patie  
nt with Doreen Cabrera CNP   
2021  
  
  
  
                                                    Last Documented On   
1 4:06PM ; [facility]  
  
  
  
                                                    Z68.42 - Body mass index [BM  
I]   
45.0-49.9, adult                        Medical New Patient with Doreen Cabrera CNP                              2021  
  
  
  
                                                    Last Documented On   
1 4:06PM ; [facility]  
Work Phone: [phone]- Evaluation note  
  
Includes: Assessments for all patient encounters  
  
  
  
                                Findings        Encounter       Date  
   
                                                    Bipolar affective disorder,   
current   
episode depressed, mild                  Established Patient with Leonie   
Short LISWS                             2024  
  
  
  
                                                    Last Documented On   
4 5:16PM ; [facility]  
  
  
  
                                        Post-traumatic stress disorder  Establ  
ished Patient with Leonie Short   
LISWS                                   2024  
  
  
  
                                                    Last Documented On   
4 5:16PM ; [facility]  
  
  
  
                                                    Undifferentiated attention d  
eficit   
disorder                                 Established Patient with   
Leonie Short LISWS                     2024  
  
  
  
                                                    Last Documented On   
4 5:16PM ; [facility]  
  
  
  
                                        Visit for: screening for disorder  Est  
ablished Patient with Leonie   
Short LISWS                             2024  
  
  
  
                                                    Last Documented On   
4 5:16PM ; [facility]  
  
  
  
                                                    [Z68.43 - Body mass index [B  
MI]   
50.0-59.9, adult] assessment of body   
mass index                              Medical Established Patient with   
Doreen Cabrera CNP                        2024  
  
  
  
                                                    Last Documented On   
4 7:50PM ; [facility]  
  
  
  
                                        Attention-deficit hyperactivity disorder  
 Medical Established Patient with   
Doreen Cabrera CNP                        2024  
  
  
  
                                                    Last Documented On   
4 7:50PM ; [facility]  
  
  
  
                                                    Diabetes Risk Test Score was  
 three   
score 3/27/2024                         Medical Established Patient with Doreen Cabrera CNP                              2024  
  
  
  
                                                    Last Documented On   
4 7:50PM ; [facility]  
  
  
  
                                        Visit for: screening for STD Medical Est  
ablished Patient with Doreen Cabrera   
CNP                                     2024  
  
  
  
                                                    Last Documented On   
4 7:50PM ; [facility]  
  
  
  
                                                    [Z68.32 - Body mass index [B  
MI]   
32.0-32.9, adult] assessment of body   
mass index                              Medical Established Patient with   
Doreen Cabrera CNP                        2023  
  
  
  
                                                    Last Documented On   
3 6:01PM ; [facility]  
  
  
  
                                        Borderline personality disorder Medical   
Established Patient with Doreen Cabrera CNP                              2023  
  
  
  
                                                    Last Documented On   
3 6:01PM ; [facility]  
  
  
  
                                        Screening for diabetes mellitus Medical   
Established Patient with Doreen Cabrera CNP                              2023  
  
  
  
                                                    Last Documented On   
3 6:01PM ; [facility]  
  
  
  
                                        Assessment of body mass index Medical Es  
tablished Patient with Doreen Cabrera CNP                              10/21/2022  
  
  
  
                                                    Last Documented On 10/21/202  
2 2:45PM ; [facility]  
  
  
  
                                                    Bipolar affective disorder,   
current   
episode manic                            [facility] with Haylienicanor Aguilar LPCC-S                        2022  
  
  
  
                                                    Last Documented On   
2 3:22PM ; [facility]  
  
  
  
                                                    Bipolar affective disorder,   
current   
episode manic                            Established Patient with Haylienicanor Martinerson LPCC-S                        2022  
  
  
  
                                                    Last Documented On   
2 2:28PM ; [facility]  
  
  
  
                                                    Bipolar affective disorder,   
current   
episode depressed, severe with   
psychosis                                [facility] with Haylienicanor Aguilar LPCC-S                        2022  
  
  
  
                                                    Last Documented On   
2 3:29PM ; [facility]  
  
  
  
                                                    Assessment of body mass inde  
x [Body   
mass index [BMI] 50.0-59.9, adult]      Open Access - Established with Julieth   
Aliya CNP                               2022  
  
  
  
                                                    Last Documented On   
2 9:36AM ; [facility]  
  
  
  
                                                    Bipolar I disorder, most rec  
ent   
episode, manic                          Open Access - Established with Julieth   
Aliya CNP                               2022  
  
  
  
                                                    Last Documented On   
2 9:36AM ; [facility]  
  
  
  
                                        Post-traumatic stress disorder BH Establ  
ished Patient with Haylie Aguilar   
LPCC-S                                  2022  
  
  
  
                                                    Last Documented On   
2 3:20PM ; [facility]  
  
  
  
                                No cough        Medical Established Patient with  
 Doreen Deborah CNP 2022  
  
  
  
                                                    Last Documented On   
2 4:04PM ; [facility]  
  
  
  
                                                    Z68.43 - Body mass index [BM  
I]   
50.0-59.9, adult                        Medical Established Patient with   
Doreen Deborah CNP                        2022  
  
  
  
                                                    Last Documented On   
2 4:04PM ; [facility]  
  
  
  
                                                    Borderline personality disor  
danny Pt   
reported hx of sx/dx                    BH Established Patient with Haylie   
Aguilar LPCC-S                        2022  
  
  
  
                                                    Last Documented On   
2 4:08PM ; [facility]  
  
  
  
                                                    Assessment of body mass inde  
x [Body   
mass index [BMI] 50.0-59.9, adult]      Open Access - Established with Julieth Pisano CNP                               2022  
  
  
  
                                                    Last Documented On   
2 7:41PM ; [facility]  
  
  
  
                                                    Diabetes Risk Test Score was  
 three   
score 2022                         Open Access - Established with Julieth   
Aliya CNP                               2022  
  
  
  
                                                    Last Documented On   
2 7:41PM ; [facility]  
  
  
  
                                                    Bipolar I disorder, most rec  
ent   
episode, manic                          BH Established Patient with Avis   
Felder LPCC-S                          2021  
  
  
  
                                                    Last Documented On   
1 1:35AM ; [facility]  
  
  
  
                                        Borderline personality disorder BH Estab  
lished Patient with Avis   
Felder LPCC-S                          2021  
  
  
  
                                                    Last Documented On   
1 1:35AM ; [facility]  
  
  
  
                                        Post-traumatic stress disorder  Establ  
ished Patient with Avis Felder LPCC-S                          2021  
  
  
  
                                                    Last Documented On   
1 1:35AM ; [facility]  
  
  
  
                                                    Assessment of visit for: scr  
eening for   
human immunodeficiency virus            Medical Established Patient with   
Doreen Deborah CNP                        2021  
  
  
  
                                                    Last Documented On   
1 2:56PM ; [facility]  
  
  
  
                                Nicotine dependence Medical Established Patient   
with Doreen Deborah CNP 2021  
  
  
  
                                                    Last Documented On   
1 2:56PM ; [facility]  
  
  
  
                                Tachycardia     Medical Established Patient with  
 Doreen Deborah CNP 2021  
  
  
  
                                                    Last Documented On   
1 2:56PM ; [facility]  
  
  
  
                                                    Z68.43 - Body mass index [BM  
I]   
50.0-59.9, adult                        Medical Established Patient with   
Doreen Deborah CNP                        2021  
  
  
  
                                                    Last Documented On   
1 2:56PM ; [facility]  
  
  
  
                                                    Bipolar I disorder, most rec  
ent   
episode, manic                           Established Patient with Leonie   
Short LISWS                             2021  
  
  
  
                                                    Last Documented On   
1 10:17AM ; [facility]  
  
  
  
                                                    Borderline personality disor  
danny per   
patient reported history                 Established Patient with Leonie   
Short LISWS                             2021  
  
  
  
                                                    Last Documented On   
1 10:17AM ; [facility]  
  
  
  
                                Nicotine dependence  Established Patient with   
Leonie Short LISWS 2021  
  
  
  
                                                    Last Documented On   
1 10:17AM ; [facility]  
  
  
  
                                        Post-traumatic stress disorder  Establ  
ished Patient with Leonie Short   
LISWS                                   2021  
  
  
  
                                                    Last Documented On   
1 10:17AM ; [facility]  
  
  
  
                                Body mass index Medical Established Patient with  
 Doreen Deborah CNP 2021  
  
  
  
                                                    Last Documented On   
1 5:23PM ; [facility]  
  
  
  
                                Morbid obesity  Medical Established Patient with  
 Doreen Deborah CNP 2021  
  
  
  
                                                    Last Documented On   
1 5:23PM ; [facility]  
  
  
  
                                        Nicotine dependence uncomplicated Medica  
l Established Patient with Doreen   
Deborah CNP                              2021  
  
  
  
                                                    Last Documented On   
1 5:23PM ; [facility]  
  
  
  
                                                    Z68.42 - Body mass index [BM  
I]   
45.0-49.9, adult                        Medical Established Patient with   
Doreen Deborah CNP                        2021  
  
  
  
                                                    Last Documented On   
1 5:23PM ; [facility]  
  
  
  
                                Episodic mood disorders On Call with Pamela edwards CNP 2021  
  
  
  
                                                    Last Documented On   
1 7:49AM ; [facility]  
  
  
  
                                                    Mood disorders, NOS per tabatha  
ent   
reported history                         Established Patient with Leonie   
Short LISWS                             2021  
  
  
  
                                                    Last Documented On   
1 7:15PM ; [facility]  
  
  
  
                                        Post-traumatic stress disorder  Establ  
ished Patient with Leonie Short   
LISWS                                   2021  
  
  
  
                                                    Last Documented On   
1 7:15PM ; [facility]  
  
  
  
                                Morbid obesity  Medical Established Patient with  
 Doreen Deborah CNP 2021  
  
  
  
                                                    Last Documented On   
1 12:31PM ; [facility]  
  
  
  
                                Otitis externa  Medical Established Patient with  
 Doreen Deborah CNP 2021  
  
  
  
                                                    Last Documented On   
1 12:31PM ; [facility]  
  
  
  
                                                    Z68.42 - Body mass index [BM  
I]   
45.0-49.9, adult                        Medical Established Patient with   
Doreen Deborah CNP                        2021  
  
  
  
                                                    Last Documented On   
1 12:31PM ; [facility]  
  
  
  
                                        Post-traumatic stress disorder  Establ  
ished Patient with Leonie Short   
LISWS                                   06/10/2021  
  
  
  
                                                    Last Documented On 06/10/202  
1 10:05PM ; [facility]  
  
  
  
                                Morbid obesity  Medical Established Patient with  
 Doreen Deborah CNP 06/10/2021  
  
  
  
                                                    Last Documented On 06/10/202  
1 4:45PM ; [facility]  
  
  
  
                                                    Z68.42 - Body mass index [BM  
I]   
45.0-49.9, adult                        Medical Established Patient with   
Doreen Deborah CNP                        06/10/2021  
  
  
  
                                                    Last Documented On 06/10/202  
1 4:45PM ; [facility]  
  
  
  
                                        Post-traumatic stress disorder  Establ  
ished Patient with Leonie Short   
LISWS                                   2021  
  
  
  
                                                    Last Documented On   
1 11:59AM ; [facility]  
  
  
  
                                                    Assessment of visit for: scr  
eening for   
human immunodeficiency virus            Medical New Patient with Doreen Cabrera CNP                              2021  
  
  
  
                                                    Last Documented On   
1 4:06PM ; [facility]  
  
  
  
                                                    Diabetes Risk Test Score was  
 one score   
2021                               Medical New Patient with Doreen Cabrera CNP                              2021  
  
  
  
                                                    Last Documented On   
1 4:06PM ; [facility]  
  
  
  
                                Hypertension    Medical New Patient with Doreen esposito CNP 2021  
  
  
  
                                                    Last Documented On   
1 4:06PM ; [facility]  
  
  
  
                                Morbid obesity  Medical New Patient with Doreen DAVID esposito CNP 2021  
  
  
  
                                                    Last Documented On   
1 4:06PM ; [facility]  
  
  
  
                                Post-traumatic stress disorder Medical New Patie  
nt with Doreen Cabrera CNP   
2021  
  
  
  
                                                    Last Documented On   
1 4:06PM ; [facility]  
  
  
  
                                                    Z68.42 - Body mass index [BM  
I]   
45.0-49.9, adult                        Medical New Patient with Doreen Cabrera CNP                              2021  
  
  
  
                                                    Last Documented On   
1 4:06PM ; [facility]  
Work Phone: [phone]- Evaluation note  
  
Includes: Assessments for all patient encounters  
  
  
  
                                Findings        Encounter       Date  
   
                                                    Bipolar affective disorder,   
current   
episode depressed, mild                  Established Patient with Leonie   
Short LISWS                             2024  
  
  
  
                                                    Last Documented On   
4 5:16PM ; [facility]  
  
  
  
                                        Post-traumatic stress disorder BH Establ  
ished Patient with Leonie Short   
LISWS                                   2024  
  
  
  
                                                    Last Documented On   
4 5:16PM ; [facility]  
  
  
  
                                                    Undifferentiated attention d  
eficit   
disorder                                 Established Patient with   
Leonie Short LISWS                     2024  
  
  
  
                                                    Last Documented On   
4 5:16PM ; [facility]  
  
  
  
                                        Visit for: screening for disorder BH Est  
ablished Patient with Leonie   
Short LISWS                             2024  
  
  
  
                                                    Last Documented On   
4 5:16PM ; [facility]  
  
  
  
                                                    [Z68.43 - Body mass index [B  
MI]   
50.0-59.9, adult] assessment of body   
mass index                              Medical Established Patient with   
Doreenpaola Cabrera CNP                        2024  
  
  
  
                                                    Last Documented On   
4 7:50PM ; [facility]  
  
  
  
                                        Attention-deficit hyperactivity disorder  
 Medical Established Patient with   
Doreen Cabrera CNP                        2024  
  
  
  
                                                    Last Documented On   
4 7:50PM ; [facility]  
  
  
  
                                                    Diabetes Risk Test Score was  
 three   
score 3/27/2024                         Medical Established Patient with Doreen Cabrera CNP                              2024  
  
  
  
                                                    Last Documented On   
4 7:50PM ; [facility]  
  
  
  
                                        Visit for: screening for STD Medical Est  
ablished Patient with Doreen Cabrera   
CNP                                     2024  
  
  
  
                                                    Last Documented On   
4 7:50PM ; [facility]  
  
  
  
                                                    [Z68.32 - Body mass index [B  
MI]   
32.0-32.9, adult] assessment of body   
mass index                              Medical Established Patient with   
Doreen Cabrera CNP                        2023  
  
  
  
                                                    Last Documented On   
3 6:01PM ; [facility]  
  
  
  
                                        Borderline personality disorder Medical   
Established Patient with Doreen Cabrera CNP                              2023  
  
  
  
                                                    Last Documented On   
3 6:01PM ; [facility]  
  
  
  
                                        Screening for diabetes mellitus Medical   
Established Patient with Doreen Cabrera CNP                              2023  
  
  
  
                                                    Last Documented On   
3 6:01PM ; [facility]  
  
  
  
                                        Assessment of body mass index Medical Es  
tablished Patient with Doreen Cabrera CNP                              10/21/2022  
  
  
  
                                                    Last Documented On 10/21/202  
2 2:45PM ; [facility]  
  
  
  
                                                    Bipolar affective disorder,   
current   
episode manic                            [facility]                        20[facility]  
  
  
  
                                                    Last Documented On   
2 3:22PM ; [facility]  
  
  
  
                                                    Bipolar affective disorder,   
current   
episode manic                            Established Patient with Haylie Martinerson [facility]                        2022  
  
  
  
                                                    Last Documented On   
2 2:28PM ; [facility]  
  
  
  
                                                    Bipolar affective disorder,   
current   
episode depressed, severe with   
psychosis                                Telebehavioral Health with Haylie Martinerson [facility]                        2022  
  
  
  
                                                    Last Documented On   
2 3:29PM ; [facility]  
  
  
  
                                                    Assessment of body mass inde  
x [Body   
mass index [BMI] 50.0-59.9, adult]      Open Access - Established with Julieth   
Aliya CNP                               2022  
  
  
  
                                                    Last Documented On   
2 9:36AM ; [facility]  
  
  
  
                                                    Bipolar I disorder, most rec  
ent   
episode, manic                          Open Access - Established with Julieth   
Aliya CNP                               2022  
  
  
  
                                                    Last Documented On   
2 9:36AM ; [facility]  
  
  
  
                                        Post-traumatic stress disorder BH Establ  
ished Patient with Haylie Aguilar   
[facility]                                  2022  
  
  
  
                                                    Last Documented On   
2 3:20PM ; [facility]  
  
  
  
                                No cough        Medical Established Patient with  
 Doreen Deborah CNP 2022  
  
  
  
                                                    Last Documented On   
2 4:04PM ; [facility]  
  
  
  
                                                    Z68.43 - Body mass index [BM  
I]   
50.0-59.9, adult                        Medical Established Patient with   
Doreen Deborah CNP                        2022  
  
  
  
                                                    Last Documented On   
2 4:04PM ; [facility]  
  
  
  
                                                    Borderline personality disor  
danny Pt   
reported hx of sx/dx                    BH Established Patient with Haylienicanor Martinerson [facility]                        2022  
  
  
  
                                                    Last Documented On   
2 4:08PM ; [facility]  
  
  
  
                                                    Assessment of body mass inde  
x [Body   
mass index [BMI] 50.0-59.9, adult]      Open Access - Established with Julieth   
Aliya CNP                               2022  
  
  
  
                                                    Last Documented On   
2 7:41PM ; [facility]  
  
  
  
                                                    Diabetes Risk Test Score was  
 three   
score 2022                         Open Access - Established with Julieth   
Aliya CNP                               2022  
  
  
  
                                                    Last Documented On   
2 7:41PM ; [facility]  
  
  
  
                                                    Bipolar I disorder, most rec  
ent   
episode, manic                          BH Established Patient with Avis   
Felder [facility]                          2021  
  
  
  
                                                    Last Documented On   
1 1:35AM ; [facility]  
  
  
  
                                        Borderline personality disorder BH Estab  
lished Patient with Avis   
Felder [facility]                          2021  
  
  
  
                                                    Last Documented On   
1 1:35AM ; [facility]  
  
  
  
                                        Post-traumatic stress disorder BH Establ  
ished Patient with Avis   
Felder [facility]                          2021  
  
  
  
                                                    Last Documented On   
1 1:35AM ; [facility]  
  
  
  
                                                    Assessment of visit for: bhargavi myles for   
human immunodeficiency virus            Medical Established Patient with   
Doreen Deborah CNP                        2021  
  
  
  
                                                    Last Documented On   
1 2:56PM ; [facility]  
  
  
  
                                Nicotine dependence Medical Established Patient   
with Doreen Deborah CNP 2021  
  
  
  
                                                    Last Documented On   
1 2:56PM ; [facility]  
  
  
  
                                Tachycardia     Medical Established Patient with  
 Doreen Deborah CNP 2021  
  
  
  
                                                    Last Documented On   
1 2:56PM ; [facility]  
  
  
  
                                                    Z68.43 - Body mass index [BM  
I]   
50.0-59.9, adult                        Medical Established Patient with   
Doreen Deborah CNP                        2021  
  
  
  
                                                    Last Documented On   
1 2:56PM ; [facility]  
  
  
  
                                                    Bipolar I disorder, most rec  
ent   
episode, manic                           Established Patient with Leonie   
Short LISWS                             2021  
  
  
  
                                                    Last Documented On   
1 10:17AM ; [facility]  
  
  
  
                                                    Borderline personality disor  
danny per   
patient reported history                 Established Patient with Leonie   
Short LISWS                             2021  
  
  
  
                                                    Last Documented On   
1 10:17AM ; [facility]  
  
  
  
                                Nicotine dependence  Established Patient with   
Leonie Short LISWS 2021  
  
  
  
                                                    Last Documented On   
1 10:17AM ; [facility]  
  
  
  
                                        Post-traumatic stress disorder BH Establ  
ished Patient with Leonie Short   
LISWS                                   2021  
  
  
  
                                                    Last Documented On   
1 10:17AM ; [facility]  
  
  
  
                                Body mass index Medical Established Patient with  
 Doreen Deborah CNP 2021  
  
  
  
                                                    Last Documented On   
1 5:23PM ; [facility]  
  
  
  
                                Morbid obesity  Medical Established Patient with  
 Doreen Deborah CNP 2021  
  
  
  
                                                    Last Documented On   
1 5:23PM ; [facility]  
  
  
  
                                        Nicotine dependence uncomplicated Medica  
l Established Patient with Doreen   
Deborah CNP                              2021  
  
  
  
                                                    Last Documented On   
1 5:23PM ; [facility]  
  
  
  
                                                    Z68.42 - Body mass index [BM  
I]   
45.0-49.9, adult                        Medical Established Patient with   
Doreen Deborah CNP                        2021  
  
  
  
                                                    Last Documented On   
1 5:23PM ; [facility]  
  
  
  
                                Episodic mood disorders On Call with Pamela edwards CNP 2021  
  
  
  
                                                    Last Documented On   
1 7:49AM ; [facility]  
  
  
  
                                                    Mood disorders, NOS per tabatha  
ent   
reported history                         Established Patient with Leonie   
Short LISWS                             2021  
  
  
  
                                                    Last Documented On   
1 7:15PM ; [facility]  
  
  
  
                                        Post-traumatic stress disorder BH Establ  
ished Patient with Leonie Short   
LISWS                                   2021  
  
  
  
                                                    Last Documented On   
1 7:15PM ; [facility]  
  
  
  
                                Morbid obesity  Medical Established Patient with  
 Doreen Deborah CNP 2021  
  
  
  
                                                    Last Documented On   
1 12:31PM ; [facility]  
  
  
  
                                Otitis externa  Medical Established Patient with  
 Doreen Deborah CNP 2021  
  
  
  
                                                    Last Documented On   
1 12:31PM ; [facility]  
  
  
  
                                                    Z68.42 - Body mass index [BM  
I]   
45.0-49.9, adult                        Medical Established Patient with   
Doreen Deborah CNP                        2021  
  
  
  
                                                    Last Documented On   
1 12:31PM ; [facility]  
  
  
  
                                        Post-traumatic stress disorder  Establ  
ished Patient with Leonie Short   
LISWS                                   06/10/2021  
  
  
  
                                                    Last Documented On 06/10/202  
1 10:05PM ; [facility]  
  
  
  
                                Morbid obesity  Medical Established Patient with  
 Doreen Deborah CNP 06/10/2021  
  
  
  
                                                    Last Documented On 06/10/202  
1 4:45PM ; [facility]  
  
  
  
                                                    Z68.42 - Body mass index [BM  
I]   
45.0-49.9, adult                        Medical Established Patient with   
Doreen Deborah CNP                        06/10/2021  
  
  
  
                                                    Last Documented On 06/10/202  
1 4:45PM ; [facility]  
  
  
  
                                        Post-traumatic stress disorder  Establ  
ished Patient with Leonie Short   
LISWS                                   2021  
  
  
  
                                                    Last Documented On   
1 11:59AM ; [facility]  
  
  
  
                                                    Assessment of visit for: scr  
eening for   
human immunodeficiency virus            Medical New Patient with Doreen   
Deborah CNP                              2021  
  
  
  
                                                    Last Documented On   
1 4:06PM ; [facility]  
  
  
  
                                                    Diabetes Risk Test Score was  
 one score   
2021                               Medical New Patient with Doreen Cabrera CNP                              2021  
  
  
  
                                                    Last Documented On   
1 4:06PM ; [facility]  
  
  
  
                                Hypertension    Medical New Patient with Doreen esposito CNP 2021  
  
  
  
                                                    Last Documented On   
1 4:06PM ; [facility]  
  
  
  
                                Morbid obesity  Medical New Patient with Doreen esposito CNP 2021  
  
  
  
                                                    Last Documented On   
1 4:06PM ; [facility]  
  
  
  
                                Post-traumatic stress disorder Medical New Patie  
nt with Doreen Cabrera CNP   
2021  
  
  
  
                                                    Last Documented On   
1 4:06PM ; [facility]  
  
  
  
                                                    Z68.42 - Body mass index [BM  
I]   
45.0-49.9, adult                        Medical New Patient with Doreen Cabrera CNP                              2021  
  
  
  
                                                    Last Documented On   
1 4:06PM ; [facility]  
Work Phone: [phone]- History general Narrative - Reported  
  
Includes: Medical History in patient's chart  
  
  
  
                                        Description         Last Updated  
   
                                        0 previous live birth(s) 2024  
  
  
  
                                                    Last Documented On   
4 7:50PM ; [facility]  
  
  
  
                                        Previously pregnant 1 time(s) 2024  
  
  
  
                                                    Last Documented On   
4 7:50PM ; [facility]  
  
  
  
                                        Recent immunization for flu 2024  
  
  
  
                                                    Last Documented On   
4 7:50PM ; [facility]  
  
  
  
                                        Has sex without a condom 2022  
  
  
  
                                                    Last Documented On   
2 4:04PM ; [facility]  
  
  
  
                                        Not planning to have a baby in the next   
12 months 2022  
  
  
  
                                                    Last Documented On   
2 4:04PM ; [facility]  
  
  
  
                                        Partners sexually transmitted infection   
status known 2022  
  
  
  
                                                    Last Documented On   
2 4:04PM ; [facility]  
  
  
  
                                        No previous hospitalizations 2022  
  
  
  
                                                    Last Documented On   
2 4:04PM ; [facility]  
  
  
  
                                        Chronic illness     2021  
  
  
  
                                                    Last Documented On   
1 7:49AM ; [facility]  
  
  
  
                                        Exposure to COVID-19 2021  
  
  
  
                                                    Last Documented On   
1 12:31PM ; [facility]  
  
  
  
                                        History of gynecologic disorder 20  
21  
  
  
  
                                                    Last Documented On   
1 4:06PM ; [facility]  
  
  
  
                                        History of Polycystic Ovarian Syndrome (  
PCOS) 2021  
  
  
  
                                                    Last Documented On   
1 4:06PM ; [facility]  
  
  
  
                                        History of anxiety disorder NOS 20  
21  
  
  
  
                                                    Last Documented On   
1 4:06PM ; [facility]  
  
  
  
                                        History of migraine headache 2021  
  
  
  
                                                    Last Documented On   
1 4:06PM ; [facility]  
  
  
  
                                        History of psychiatric disorders biopola  
r disorder 2021  
  
  
  
                                                    Last Documented On   
1 4:06PM ; [facility]  
Work Phone: [phone]- History general Narrative - Reported  
  
Includes: Medical History in patient's chart  
  
  
  
                                        Description         Last Updated  
   
                                        0 previous live birth(s) 2024  
  
  
  
                                                    Last Documented On   
4 7:50PM ; [facility]  
  
  
  
                                        Previously pregnant 1 time(s) 2024  
  
  
  
                                                    Last Documented On   
4 7:50PM ; [facility]  
  
  
  
                                        Recent immunization for flu 2024  
  
  
  
                                                    Last Documented On   
4 7:50PM ; [facility]  
  
  
  
                                        Has sex without a condom 2022  
  
  
  
                                                    Last Documented On   
2 4:04PM ; [facility]  
  
  
  
                                        Not planning to have a baby in the next   
12 months 2022  
  
  
  
                                                    Last Documented On   
2 4:04PM ; [facility]  
  
  
  
                                        Partners sexually transmitted infection   
status known 2022  
  
  
  
                                                    Last Documented On   
2 4:04PM ; [facility]  
  
  
  
                                        No previous hospitalizations 2022  
  
  
  
                                                    Last Documented On   
2 4:04PM ; [facility]  
  
  
  
                                        Chronic illness     2021  
  
  
  
                                                    Last Documented On   
1 7:49AM ; [facility]  
  
  
  
                                        Exposure to COVID-19 2021  
  
  
  
                                                    Last Documented On   
1 12:31PM ; [facility]  
  
  
  
                                        History of gynecologic disorder 20  
21  
  
  
  
                                                    Last Documented On   
1 4:06PM ; [facility]  
  
  
  
                                        History of Polycystic Ovarian Syndrome (  
PCOS) 2021  
  
  
  
                                                    Last Documented On   
1 4:06PM ; [facility]  
  
  
  
                                        History of anxiety disorder NOS 20  
21  
  
  
  
                                                    Last Documented On   
1 4:06PM ; [facility]  
  
  
  
                                        History of migraine headache 2021  
  
  
  
                                                    Last Documented On   
1 4:06PM ; [facility]  
  
  
  
                                        History of psychiatric disorders biopola  
r disorder 2021  
  
  
  
                                                    Last Documented On   
1 4:06PM ; [facility]  
Work Phone: [phone]- History general Narrative - Reported  
  
Includes: Medical History in patient's chart  
  
  
  
                                        Description         Last Updated  
   
                                        0 previous live birth(s) 2024  
  
  
  
                                                    Last Documented On   
4 7:50PM ; [facility]  
  
  
  
                                        Previously pregnant 1 time(s) 2024  
  
  
  
                                                    Last Documented On   
4 7:50PM ; [facility]  
  
  
  
                                        Recent immunization for flu 2024  
  
  
  
                                                    Last Documented On   
4 7:50PM ; [facility]  
  
  
  
                                        Has sex without a condom 2022  
  
  
  
                                                    Last Documented On   
2 4:04PM ; [facility]  
  
  
  
                                        Not planning to have a baby in the next   
12 months 2022  
  
  
  
                                                    Last Documented On   
2 4:04PM ; [facility]  
  
  
  
                                        Partners sexually transmitted infection   
status known 2022  
  
  
  
                                                    Last Documented On   
2 4:04PM ; [facility]  
  
  
  
                                        No previous hospitalizations 2022  
  
  
  
                                                    Last Documented On   
2 4:04PM ; [facility]  
  
  
  
                                        Chronic illness     2021  
  
  
  
                                                    Last Documented On   
1 7:49AM ; [facility]  
  
  
  
                                        Exposure to COVID-19 2021  
  
  
  
                                                    Last Documented On   
1 12:31PM ; [facility]  
  
  
  
                                        History of gynecologic disorder 20  
21  
  
  
  
                                                    Last Documented On   
1 4:06PM ; [facility]  
  
  
  
                                        History of Polycystic Ovarian Syndrome (  
PCOS) 2021  
  
  
  
                                                    Last Documented On   
1 4:06PM ; [facility]  
  
  
  
                                        History of anxiety disorder NOS 20  
21  
  
  
  
                                                    Last Documented On   
1 4:06PM ; [facility]  
  
  
  
                                        History of migraine headache 2021  
  
  
  
                                                    Last Documented On   
1 4:06PM ; [facility]  
  
  
  
                                        History of psychiatric disorders biopola  
r disorder 2021  
  
  
  
                                                    Last Documented On   
1 4:06PM ; [facility]  
Work Phone: [phone]- Progress note*   
  
Progress note  
  
  
  
                                Date            Encounter       Last Documented   
by  
   
                                2024      Medical Established Patient Last  
 documented on 2024; 7:50 PM,   
Doreen Cabrera CNP; [facility]  
  
  
  
                                                      
  
  
** Active Problems & Conditions **  
- F90.9 - Attention-deficit Hyperactivity Disorder  
- F31.31 - Bipolar I Disorder, Most Recent Episode, Depressed Mild  
- F43.10 - Post-traumatic Stress Disorder  
  
** Chief Complaint **  
The Chief Complaint is: Patient was D/C'd from [facility] in 
[place]   
for no call no show. Patient needs all meds refilled. Ran out 3 days ago.  
  
** Referred Here **  
No prior encounters.  
  
** History of Present Illness **  
Linnette Beckham is a 23 year old female.  
- Allergy list reviewed - Reviewed Medications - Medication list reviewed  
- Date of last menstruation 2024 - Pregnancy test - would not like a 
pregnancy   
test  
Presents to see if we can take over psych meds , got dismissed from [facility] r/t no 
shows.   
has felt stable on meds x 11 months  
  
** Current Medication **  
- ARIPiprazole 5 MG Oral Tablet once daily, 90 days, 0 refills  
- busPIRone HCl 5 MG Oral Tablet one tablet twice daily, 90 days, 0 refills  
- lamoTRIgine 100 MG Oral Tablet once daily, 30 days, 0 refills  
- metFORMIN HCl 500 MG Oral Tablet AM and PM per GYN/NOMS in [place], 30 days, 0
   
refills  
- MiraLax 17 GM/SCOOP Oral Powder mix 1 scoop with 8 ounces once daily, 30 days,
 2   
refills  
- Narcan 4 MG/0.1ML Nasal Liquid spray in nostril for symptoms of overdose , may
   
repeat in 2 minutes if symptoms persist, 1 days, 0 refills  
- Prazosin HCl 1 MG Oral Capsule twice daily, 90 days, 0 refills  
- Sertraline HCl 50 MG Oral Tablet Once daily, 30 days, 0 refills  
- traZODone HCl 100 MG Oral Tablet twice daily, 30 days, 0 refills  
  
** Past Medical/Surgical History **  
Reported:  
Has sex without a condom.  
Medical: No previous hospitalizations. Chronic illness and Sexually transmitted   
infection Partners sexually transmitted infection status known.  
Immunization History: Recent immunization for flu.  
Exposure: Exposure to COVID-19.  
Pregnancy: Previously pregnant 1 time(s) and para having 0 live birth(s). Not   
planning a pregnancy in the next year.  
Diagnoses:  
Polycystic Ovarian Syndrome (PCOS).  
Migraine headache.  
Psychiatric disorders biopolar disorder  
Anxiety disorder NOS  
Procedural:  
- Insertion of ear pressure equalization tubes in both ears  
Surgical:  
- Tonsillectomy  
- Tonsillectomy with adenoidectomy  
  
** Social History **  
Environmental Exposure: No secondhand cigarette smoke exposure.  
Behavioral: Not a current tobacco user.  
Tobacco use: Using electronic cigarettes/vaping.  
Alcohol: Not using alcohol.  
Drug Use: Not using drugs denied by patient.  
Sexual: Sexually active age of sexual partner is _____ years old 21, sexual   
orientation Lesbian or David, gender identity Other, and birth control is being   
practiced Not Using - In Same Sex Relationship.  
  
** Allergies **  
- Abilify Reaction: groggy  
- Augmentin Reaction: Shock  
- metformin Reaction: Nausea, Vomiting  
- trazodone Reaction: Panic attack  
- Zoloft Reaction: slow/ groggy  
  
** Family History **  
Paternal:  
Systemic hypertension  
Oncologic disorder  
Maternal:  
Systemic hypertension  
Epilepsy and recurrent seizures  
Psychiatric disorders  
Oncologic disorder  
Fraternal:  
Psychiatric disorders  
  
** Review Of Systems **  
Head: No head symptoms.  
Neck: No neck symptoms.  
Eyes: No eye symptoms.  
Otolaryngeal: No ear symptoms, no nasal symptoms, no nose and sinus finding, no   
throat symptoms, no oral cavity symptoms, and no jaw symptoms.  
Breasts: No breast symptoms.  
Cardiovascular: No cardiovascular symptoms and no chest pain or discomfort.  
Pulmonary: No pulmonary symptoms.  
Gastrointestinal: No gastrointestinal symptoms.  
Genitourinary: No genitourinary symptoms.  
Musculoskeletal: No musculoskeletal symptoms.  
Neurological: No neurological symptoms.  
Psychological: Easily distracted.  
Skin: No skin symptoms.  
  
** Physical Findings **  
- Vitals taken 2024 07:09 pm  
BP-Sitting R134/88 mmHg  
BP Cuff SizeLarge  
Pulse Rate-Xystsig853 bpm  
Cksjqf47 in  
Qfyxet302 lbs  
Body Mass Index52.7 kg/m2  
Body Surface Area2.3 m2  
Oxygen Foiqyqhxjp71 %  
  
Vital Signs:  
- Systolic blood pressure 130 - 139 mmHg.  
- Diastolic blood pressure 80-89 mmHg.  
General Appearance:  
- Awake. - Alert. - Well developed. - Well nourished. - In no acute distress.  
Lungs:  
- Respiration rhythm and depth was normal. - Clear to auscultation.  
Cardiovascular:  
Heart Rate And Rhythm: - Normal.  
Heart Sounds: - Normal.  
Murmurs: - No murmurs were heard.  
Abdomen:  
Visual Inspection: - Abdomen was normal on visual inspection.  
Musculoskeletal System:  
General/bilateral: - Normal movement of all extremities.  
Skin:  
- General appearance was normal.  
  
** Tests **  
Blood Analysis:  
Hemoglobin Studies: ValueDate  
Blood hemoglobin A1c 5.5%3/27/2024  
Hemoglobin A1c level < 7.0%.  
Blood Endocrine Laboratory Tests: Value  
Blood glucose level by fingerstick Non-fasting 96 mg/dl  
Laboratory-based Chemistry:  
Other Laboratory Tests:  
Screening for sexually transmitted infections was performed.  
  
** Assessment **  
- Z11.3 - Encounter for screening for infections with a predominantly sexual 
mode of   
transmission  
- Z68.43 - Body mass index [BMI] 50.0-59.9, adult  
- Z13.1 - Encounter for screening for diabetes mellitus  
- F90.9 - Attention-deficit hyperactivity disorder, unspecified type  
  
** Vaccinations **  
- 1st Dose PFIZER COVID-19 Vaccine Dose #1 Status: Prev Hist Date: 2021  
- 1st Dose PFIZER COVID-19 Vaccine Dose #2 Status: Prev Hist Date: 2021  
- DTP Dose #1 Status: Prev Hist Date: 1999  
- Hep B, adolescent or pediatric (Engerix-B) Dose #1 Status: Prev Hist Date:   
1999  
- Hep B, adolescent or pediatric (Engerix-B) Dose #2 Status: Prev Hist Date:   
1999  
- Hib-Hep B (Comvax) Dose #1 Status: Prev Hist Date: 2000  
- IPV (IPOL) Dose #1 Status: Prev Hist Date: 1999  
- IPV (IPOL) Dose #2 Status: Prev Hist Date: 2000  
- IPV (IPOL) Dose #3 Status: Prev Hist Date: 2004  
- MMR (MMR-II) Dose #1 Status: Prev Hist Date: 2000  
- MMR (MMR-II) Dose #2 Status: Prev Hist Date: 2004  
- Meningococcal MCV4O Dose #1 Status: Prev Hist Date: 2016  
- OPV Dose #1 Status: Prev Hist Date: 1999  
- Tdap (Boostrix) Dose #1 Status: Prev Hist Date: 2010  
  
- Received dose of Reported: Patient has received the COVID Vaccine  
  
** Counseling/Education **  
- Wishing to stop using electronic cigarettes/vaping  
- Discussed nutritional needs teach healthy choices including fruits and 
vegetables  
- Patient education about a proper diet  
- Not requesting contraception  
- Discussed concerns about exercise: promote physical activity  
  
** Plan **  
StartCited- Attention-deficit hyperactivity disorder, unspecified type  
Intuniv 1 MG tablet take 1 tablet by mouth once daily at bedtime, 30 days, 5 
refills  
EndCited  
StartCited- Encntr screen for infections w sexl mode of transmiss  
Outside Labs/Microbiology: All 3 Urine Test Gonorrhea-Chlamydia-Trichomonas  
EndCited  
StartCited- Other  
Follow-up Appointment  
2 month med check  
ARIPiprazole 5 MG tablet take 1 tablet by mouth once daily, 30 days, 5 refills  
busPIRone HCl 5 MG tablet take 1 tablet by mouth twice daily, 30 days, 5 refills  
lamoTRIgine 100 MG tablet take 1 tablet by mouth once daily, 30 days, 5 refills  
Prazosin HCl 1 MG capsule take 1 capsule by mouth twice daily, 30 days, 5 
refills  
Sertraline HCl 50 MG tablet take 1 tablet by mouth once daily, 30 days, 5 
refills  
traZODone HCl 100 MG tablet take 1 tablet by mouth twice daily, 30 days, 5 
refills  
EndCited  
** Care Team **  
- Doreen Cabrera CNP  
  
** Health Reminders **  
- Assess BMI satisfied 2024.  
- Assess Tobacco Use satisfied 2024.  
- Diabetes Risk Screening Needed satisfied 2024.  
- Follow Up Plan BMI Management satisfied 2024.  
- Smoking & Tobacco Cessation Intervention and Counseling satisfied 2024.  
  
** User Defined 1 **  
Not planning a pregnancy in the next year.  
No (0 points) [Pre-DM]: No, patient has not been diagnosed with high blood 
pressure,   
No (0 points) [Pre-DM]: Patient has not been diagnosed with gestational diabetes
or   
given birth to a baby weighing 9 pounds or more, No (0 points) [Pre-DM]: No, 
mother,   
father, sister, or brother does not have DM, No (1 point) [Pre-DM]: No, not   
physically active, and Woman (0 Points) [Pre-DM].  
Less than 40 years (0 points) [Pre-DM].  
  
  
[facility]03- Progress note*   
  
Progress note  
  
  
  
                                Date            Encounter       Last Documented   
by  
   
                                2024       Established Patient Last docu  
mented on 2024; 5:16 PM,   
Leonie HUTCHINSON; [facility]  
  
  
  
                                                      
  
  
** Active Problems & Conditions **  
- F90.9 - Attention-deficit Hyperactivity Disorder  
- F31.31 - Bipolar I Disorder, Most Recent Episode, Depressed Mild  
- F43.10 - Post-traumatic Stress Disorder  
  
** Chief Complaint **  
The Chief Complaint is: [facility] met with patient to follow-up regarding mood and   
medications and discuss PHQ score of 2. Patient reports recently getting 
dismissed   
from services at a [facility] in [place] due to missing 
appointments.   
Patient reports being on a good medication regimen and feels that moods have 
been   
stable, sober from drugs for 6-7 months, in a healthy relationship and engaging 
with   
family and friends. Patient reports being diagnosed with ADHD but not treated at
 this   
time and would like to be due to concentration and focus issues. Patient reports
 no   
thoughts of harm toward self or others.  
  
** History of Present Illness **  
Linnette Beckham is a 23 year old female.  
- No Irritability - Normal appetite  
- Anxiety - Energy level is good - Easily distracted - Racing thoughts - No   
depression - No sleep disturbances - No loss of interest in activities - Not 
feeling   
guilty - No social isolation - No impulsive behavior - No high involvement in   
pleasurable activities  
  
** Current Medication **  
- ARIPiprazole 5 MG Oral Tablet take 1 tablet by mouth once daily, 30 days, 5 
refills  
- ARIPiprazole 5 MG Oral Tablet once daily, 90 days, 0 refills  
- busPIRone HCl 5 MG Oral Tablet take 1 tablet by mouth twice daily, 30 days, 5   
refills  
- busPIRone HCl 5 MG Oral Tablet one tablet twice daily, 90 days, 0 refills  
- Intuniv 1 MG Oral Tablet Extended Release 24 Hour take 1 tablet by mouth once 
daily   
at bedtime, 30 days, 5 refills  
- lamoTRIgine 100 MG Oral Tablet take 1 tablet by mouth once daily, 30 days, 5   
refills  
- lamoTRIgine 100 MG Oral Tablet once daily, 30 days, 0 refills  
- metFORMIN HCl 500 MG Oral Tablet AM and PM per GYN/NOMS in [place], 30 days, 0
   
refills  
- MiraLax 17 GM/SCOOP Oral Powder mix 1 scoop with 8 ounces once daily, 30 days,
 2   
refills  
- Narcan 4 MG/0.1ML Nasal Liquid spray in nostril for symptoms of overdose , may
   
repeat in 2 minutes if symptoms persist, 1 days, 0 refills  
- Prazosin HCl 1 MG Oral Capsule take 1 capsule by mouth twice daily, 30 days, 5
   
refills  
- Prazosin HCl 1 MG Oral Capsule twice daily, 90 days, 0 refills  
- Sertraline HCl 50 MG Oral Tablet take 1 tablet by mouth once daily, 30 days, 5
   
refills  
- Sertraline HCl 50 MG Oral Tablet Once daily, 30 days, 0 refills  
- traZODone HCl 100 MG Oral Tablet take 1 tablet by mouth twice daily, 30 days, 
5   
refills  
- traZODone HCl 100 MG Oral Tablet twice daily, 30 days, 0 refills  
  
** Social History **  
Environmental Exposure: No secondhand cigarette smoke exposure.  
Personal: Recent emotional stress due to running out of medications recently.  
Behavioral: Not a current tobacco user.  
Tobacco use: Using electronic cigarettes/vaping.  
Alcohol: Not using alcohol.  
Drug Use: Recovering from drug addiction. Not using drugs.  
  
** Physical Findings **  
General Appearance:  
- Normal Appearance.  
Neurological:  
- Estimated intelligence was normal. - Oriented to time, place, and person. - No
   
hallucinations. - Judgement was not impaired.  
Speech: - Is Normal.  
Psychiatric:  
- Mood is Euthymic. - Attitude Open.  
Demonstrated Behavior: - Motor Activity Normal Activity. - Eye Contact 
Appropriate.  
Affect: - Congruent with the mood.  
Thought Content: - Insight was intact. - No delusions. - No suicidal ideation. -
 No   
Passive thoughts of Death. - No suicidal plans. - No suicidal intent. - No 
homicidal   
ideations. - No homicidal plans. - No homicidal intent.  
Past Medical:  
- No repetitive self injurious behavior.  
  
** Assessment **  
- Z13.89 - Encounter for screening for other disorder  
- F90.9 - Attention-deficit hyperactivity disorder, unspecified type  
- F31.31 - Bipolar disorder, current episode depressed, mild  
- F43.10 - Post-traumatic stress disorder, unspecified  
  
** Therapy **  
- Developmental/Behavioral Screening & Testing - PHQ9.  
- SBIRT Full Screen Neg.  
- Brief solution-focused therapy.  
-  Visit 30 Minutes.  
- Plan - PCP to continue current medications and start Intuniv 1 mg daily. 
Patient   
agreeable to plan and Collaborated with patient and provider:  
  
** Counseling/Education **  
[facility] offered active and supportive listening and processed current stressors 
related   
to getting medications.  
[facility] discussed coping skills and supports to implement in daily routine.  
[facility] discussed progress patient has felt they have made recently and encouraged   
continued follow-up with providers to address health.  
  
** Plan **  
Patient to take medications as prescribed and contact the office with any 
questions   
or concerns.  
Patient to implement coping skills and positive supports as discussed.  
[facility] to attempt to follow-up with patient via phone in 2 weeks and at next in 
person   
visit as scheduled.  
  
** Care Team **  
- Doreen Cabrera CNP  
  
** Health Reminders **  
- Assess Tobacco Use satisfied 2024.  
- ESTHELA-2 satisfied 2024.  
- PHQ9 / PHQA satisfied 2024.  
- SBIRT satisfied 2024.  
  
** User Defined 1 **  
Not afraid of someone you have a relationship with No.  
Has lack of transportation kept patient from medical appointments or from 
getting   
medications: No and lack of transportation has kept patient from beneficial   
non-medical activities: No.  
She has not had 4 or more drinks in a day within the past year. Do you feel 
stress -   
tense, restless, nervous, or anxious, or unable to sleep at night because your 
mind   
is troubled all the time - these days? A little bit. No misuse of prescription 
only   
drugs. Illicit drug use and Does patient feel physically and emotionally safe 
where   
he/she lives: Yes. Is patient is worried about losing housing: No. What is the   
highest grade or level of school you have completed or the highest degree you 
have   
received? More than high school. In the past year, patient or family members in   
household were unable to get needed clothing: No, unable to get needed child 
care:   
No, unable to get other needs No, unable to get needed phone: No, unable to get   
needed utilities: No, unable to get needed Medicine or Health Care: No, and In 
the   
past year, patient or family members in household were unable to get needed 
food: No.   
How often does patient see or talk to people that that he/she cares about and 
feels   
close to: 5 or more times a week.  
ESTHELA-2 score was two 3/27/2024, ESTHELA-7 score [ESTHELA-7] Feeling nervous, anxious or 
on   
edge? + 2 pt : More than half the days, [ESTHELA-7] Not being able to stop or 
control   
worrying? + 0 pt : Not at all, Patient Health Questionnaire 9-Item total score 
was   
two 3/27/2024 If you checked off problems, how difficult is it for you to do 
your   
work? + : Somewhat difficult, [PHQ-9-1] Little interest or pleasure in doing 
things?   
+ 0 pt : Not at all, [PHQ-9-2] Feeling down, depressed, or hopeless? + 0 pt : 
Not at   
all, [PHQ-9-3] Trouble falling or staying asleep or sleeping too much? + 0 pt : 
Not   
at all, [PHQ-9-4] Feeling tired or having little energy? + 0 pt : Not at all,   
[PHQ-9-5] Poor appetite or overeating? + 0 pt : Not at all, [PHQ-9-6] Feeling 
bad   
about yourself-or that you are a failure + 0 pt : Not at all, [PHQ-9-7] Trouble   
concentrating on things such as reading the newspaper + 2 pt : More than half 
the   
days, [PHQ-9-8] Moving or speaking so slowly that other people have noticed. + 0
 pt :   
Not at all, and [PHQ-9-9] Thoughts that you would be better off dead or hurting   
yourself? + 0 pt : Not at all.  
  
  
[facility]07- Progress note*   
  
Progress note  
  
  
  
                                Date            Encounter       Last Documented   
by  
   
                                2023      Medical Established Patient Last  
 documented on 2023; 6:01 PM,   
Doreen Cabrera CNP; [facility]  
  
  
  
                                                      
  
  
** Active Problems & Conditions **  
- F31.10 - Bipolar I Disorder, Most Recent Episode, Manic  
- F60.3 - Borderline Personality Disorder  
- F43.10 - Post-traumatic Stress Disorder  
  
** Chief Complaint **  
The Chief Complaint is: Yearly follow up/would like referral to psychiatry (not 
Dr. Sullivan).  
  
** Referred Here **  
No prior encounters.  
  
** History of Present Illness **  
Linnette Beckham is a 23 year old female.  
- Allergy list reviewed - Reviewed Medications not taking any medications -   
Medication list reviewed  
Presents to get psych referral  anyone but dr sullivan  we had tried dr alonso in 
the   
past who was not accepting pts at that time. bh not in house and pt felt she 
didnt   
need to speak to the outside provider covering   I have a counselor   
  
Yearly follow up, would like a referral to psychiatry/not Dr. Sullivan, she does see
   
Haylie Almendarez a counselor at [facility]  
Currently only taking Metformin d/t PCOS  
  
** Current Medication **  
- Aldactazide 50-50 MG Oral Tablet take 1 tablet by mouth once daily, 30 days, 
11   
refills  
- metFORMIN HCl 500 MG Oral Tablet AM and PM per GYN/[facility] in [place], 30 days, 0
   
refills  
- MiraLax 17 GM/SCOOP Oral Powder mix 1 scoop with 8 ounces once daily, 30 days,
 2   
refills  
- Narcan 4 MG/0.1ML Nasal Liquid spray in nostril for symptoms of overdose , may
   
repeat in 2 minutes if symptoms persist, 1 days, 0 refills  
  
** Past Medical/Surgical History **  
Reported:  
Has sex without a condom.  
Medical: No previous hospitalizations. Chronic illness and Sexually transmitted   
infection Partners sexually transmitted infection status known.  
Exposure: Exposure to COVID-19.  
Pregnancy: Previously pregnant 0 time(s). Not planning to have a baby in the 
next 12   
months.  
Diagnoses:  
Polycystic Ovarian Syndrome (PCOS).  
Migraine headache.  
Psychiatric disorders biopolar disorder  
Anxiety disorder NOS  
Procedural:  
- Insertion of ear pressure equalization tubes in both ears  
Surgical:  
- Tonsillectomy  
- Tonsillectomy with adenoidectomy  
  
** Social History **  
Environmental Exposure: No secondhand cigarette smoke exposure.  
Behavioral: Not a current tobacco user.  
Tobacco use: Not using electronic cigarettes/vaping.  
Alcohol: Not using alcohol.  
Drug Use: Using marijuana.  
Sexual: Sexual orientation Other and gender identity Other.  
  
** Allergies **  
- Abilify Reaction: groggy  
- Augmentin Reaction: Shock  
- metformin Reaction: Nausea, Vomiting  
- trazodone Reaction: Panic attack  
- Zoloft Reaction: slow/ groggy  
  
** Family History **  
Paternal:  
Systemic hypertension  
Oncologic disorder  
Maternal:  
Systemic hypertension  
Epilepsy and recurrent seizures  
Psychiatric disorders  
Oncologic disorder  
Fraternal:  
Psychiatric disorders  
  
** Review Of Systems **  
Head: No head symptoms.  
Neck: No neck symptoms.  
Eyes: No eye symptoms.  
Otolaryngeal: No ear symptoms, no nasal symptoms, no nose and sinus finding, no   
throat symptoms, no oral cavity symptoms, and no jaw symptoms.  
Breasts: No breast symptoms.  
Cardiovascular: No cardiovascular symptoms and no chest pain or discomfort.  
Pulmonary: No pulmonary symptoms.  
Gastrointestinal: No gastrointestinal symptoms.  
Genitourinary: No genitourinary symptoms.  
Musculoskeletal: No musculoskeletal symptoms.  
Neurological: No neurological symptoms.  
Psychological: No psychological symptoms.  
Skin: No skin symptoms.  
Cardiovascular: Edema not present.  
  
** Physical Findings **  
- Vitals taken 2023 05:08 pm  
BP-Sitting L111/76 mmHg  
BP Cuff SizeLarge  
Pulse Rate-Aqptgui33 bpm  
Temp-Oral98.2 F  
Gszbpu52 in  
Kenrxd735 lbs  
Body Mass Index32.8 kg/m2  
Body Surface Area1.9 m2  
Oxygen Bxjwyoykpn84 %  
  
Vital Signs:  
- Systolic blood pressure < 130 mmHg.  
- Diastolic Blood Pressure < 80 mmHg.  
General Appearance:  
- Awake. - Alert. - Well developed. - Well nourished. - In no acute distress.  
Lungs:  
- Respiration rhythm and depth was normal. - Clear to auscultation.  
Cardiovascular:  
Heart Rate And Rhythm: - Normal.  
Heart Sounds: - Normal.  
Murmurs: - No murmurs were heard.  
Thrill: - No thrill.  
Abdomen:  
Visual Inspection: - Abdomen was normal on visual inspection.  
Musculoskeletal System:  
General/bilateral: - Abnormal movement of all extremities.  
Skin:  
- General appearance was normal.  
  
** Tests **  
Blood Analysis:  
Hemoglobin Studies: ValueDate  
Blood hemoglobin A1c 5.3%2023  
Hemoglobin A1c level < 7.0%.  
Blood Endocrine Laboratory Tests: Value  
Blood glucose level by fingerstick Non-fasting 114 mg/dl  
  
** Assessment **  
- Z68.32 - Body mass index [BMI] 32.0-32.9, adult  
- F60.3 - Borderline personality disorder  
- Z13.1 - Encounter for screening for diabetes mellitus  
  
** Therapy **  
- Patient has agreed to receive flu vaccine today.  
  
** Vaccinations **  
- Received dose of Reported: Patient has received the COVID Vaccine  
  
** Counseling/Education **  
- Discussed nutritional needs teach healthy choices including fruits and 
vegetables  
- Patient education about a proper diet  
- Discussed concerns about exercise: promote physical activity  
  
** Plan **  
StartCited- Borderline personality disorder  
Referrals: Psychiatry  
Instructions: Please make a referral to: see if dr alonso accepting now, 
otherwise ok   
with arminda or edy  
EndCited  
StartCited- Essential (primary) hypertension  
Aldactazide 50-50 MG tablet take 1 tablet by mouth once daily, 30 days, 11 
refills  
EndCited  
StartCited- Other  
Follow-up Appointment  
f/u phone call 1 month  
EndCited  
** Health Reminders **  
- Assess BMI satisfied 2023.  
- Assess Tobacco Use satisfied 2023.  
- Follow Up Plan BMI Management satisfied 2023.  
  
  
[id number]  
  
Includes: Assessments for all patient encounters  
  
  
  
                                Findings        Encounter       Date  
   
                                        Assessment of body mass index Medical Es  
tablished Patient with   
Doreen Cabrera CNP                        10/21/2022  
   
                                                    Bipolar affective disorder,   
current   
episode manic                            Telebehavioral Health with Haylie Aguilar [facility]                        2022  
   
                                                    Bipolar affective disorder,   
current   
episode manic                            Established Patient with Haylie Aguilar [facility]                        2022  
   
                                                    Bipolar affective disorder,   
current   
episode depressed, severe with   
psychosis                                Telebehavioral Health with Haylie Aguilar [facility]                        2022  
   
                                                    Assessment of body mass inde  
x [Body   
mass index [BMI] 50.0-59.9, adult]      [facility] - Established with   
Julieth Pisano CNP                        2022  
   
                                                    Bipolar I disorder, most rec  
ent   
episode, manic                          Open Access - Established with   
Julieth Pisano CNP                        2022  
   
                                        Post-traumatic stress disorder  Establ  
ished Patient with Haylie Aguilar [facility]                        2022  
   
                                        No cough            Medical Established   
Patient with   
Doreen Mccormacken CNP                        2022  
   
                                                    Z68.43 - Body mass index [BM  
I]   
50.0-59.9, adult                        Medical Established Patient with   
Doreen Deborah CNP                        2022  
   
                                                    Borderline personality disor  
danny Pt   
reported hx of sx/dx                     Established Patient with Haylienicanor Aguilar [facility]                        2022  
   
                                                    Assessment of body mass inde  
x [Body   
mass index [BMI] 50.0-59.9, adult]      Open Access - Established with   
Julieth Aliya CNP                        2022  
   
                                                    Diabetes Risk Test Score was  
 three   
score 2022                         Open Access - Established with   
Julieth Aliya CNP                        2022  
   
                                                    Bipolar I disorder, most rec  
ent   
episode, manic                           Established Patient with   
Avis Felder [facility]                2021  
   
                                        Borderline personality disorder  Estab  
lished Patient with   
Avis Felder [facility]                2021  
   
                                        Post-traumatic stress disorder  Establ  
ished Patient with   
Avis Felder [facility]                2021  
   
                                                    Assessment of visit for: bhargavi myles for   
human immunodeficiency virus            Medical Established Patient with   
Doreen Deborah CNP                        2021  
   
                                        Nicotine dependence Medical Established   
Patient with   
Doreen Deborah CNP                        2021  
   
                                        Tachycardia         Medical Established   
Patient with   
Doreen Deborah CNP                        2021  
   
                                                    Z68.43 - Body mass index [BM  
I]   
50.0-59.9, adult                        Medical Established Patient with   
Doreen Deborah CNP                        2021  
   
                                                    Bipolar I disorder, most rec  
ent   
episode, manic                           Established Patient with Leonie   
[facility]                             2021  
   
                                                    Borderline personality disor  
danny per   
patient reported history                BH Established Patient with Leonie   
[facility]                             2021  
   
                                        Nicotine dependence  Established Patie  
nt with Leonie   
[facility]                             2021  
   
                                        Post-traumatic stress disorder  Establ  
ished Patient with Leonie   
[facility]                             2021  
   
                                        Body mass index     Medical Established   
Patient with   
Doreen Deborah CNP                        2021  
   
                                        Morbid obesity      Medical Established   
Patient with   
Doreen Deborah CNP                        2021  
   
                                        Nicotine dependence uncomplicated Medica  
l Established Patient with   
Doreen Deborah CNP                        2021  
   
                                                    Z68.42 - Body mass index [BM  
I]   
45.0-49.9, adult                        Medical Established Patient with   
Doreen Deborah CNP                        2021  
   
                                Episodic mood disorders On Call with Pamela edwards CNP 2021  
   
                                                    Mood disorders, NOS per tabatha  
ent   
reported history                        BH Established Patient with Leonie   
[facility]                             2021  
   
                                        Post-traumatic stress disorder BH Establ  
ished Patient with Leonie   
[facility]                             2021  
   
                                        Morbid obesity      Medical Established   
Patient with   
Doreen Deborah CNP                        2021  
   
                                        Otitis externa      Medical Established   
Patient with   
Doreen Deborah CNP                        2021  
   
                                                    Z68.42 - Body mass index [BM  
I]   
45.0-49.9, adult                        Medical Established Patient with   
Doreen Deborah CNP                        2021  
   
                                        Post-traumatic stress disorder BH Establ  
ished Patient with Leonie   
[facility]                             06/10/2021  
   
                                        Morbid obesity      Medical Established   
Patient with   
Doreen Deborah [facility]                        06/10/2021  
   
                                                    Z68.42 - Body mass index [BM  
I]   
45.0-49.9, adult                        Medical Established Patient with   
Doreen Deborah [facility]                        06/10/2021  
   
                                        Post-traumatic stress disorder  Establ  
ished Patient with Leonie   
[facility]                             2021  
   
                                                    Assessment of visit for: bhargavi myles for   
human immunodeficiency virus            Medical New Patient with Doreen   
Deborah [facility]                              2021  
   
                                                    Diabetes Risk Test Score was  
 one score   
2021                               Medical New Patient with Doreen   
Deborah [facility]                              2021  
   
                                        Hypertension        Medical New Patient   
with Doreen   
Deborah [facility]                              2021  
   
                                        Morbid obesity      Medical New Patient   
with Doreen   
Deborah [facility]                              2021  
   
                                        Post-traumatic stress disorder Medical N  
ew Patient with Doreen   
Deborah [facility]                              2021  
   
                                                    Z68.42 - Body mass index [BM  
I]   
45.0-49.9, adult                        Medical New Patient with Doreen   
Deborah [facility]                              2021  
  
[facility] [place] [facility]""  
Work Phone: [phone]- Evaluation note  
  
Includes: Assessments for all patient encounters  
  
  
  
                                Findings        Encounter       Date  
   
                                                    Bipolar affective disorder,   
current   
episode manic                            Telebehavioral Health with Haylie Aguilar [facility]                        2022  
   
                                                    Bipolar affective disorder,   
current   
episode manic                            Established Patient with Haylienicanor Aguilar [facility]                        2022  
   
                                                    Bipolar affective disorder,   
current   
episode depressed, severe with   
psychosis                                Telebehavioral Health with Haylienicanor Aguilar [facility]                        2022  
   
                                                    Assessment of body mass inde  
x [Body   
mass index [BMI] 50.0-59.9, adult]      Open Access - Established with   
Julieth Aliya CNP                        2022  
   
                                                    Bipolar I disorder, most rec  
ent   
episode, manic                          Open Access - Established with   
Julieth Aliya CNP                        2022  
   
                                        Post-traumatic stress disorder  Establ  
ished Patient with Haylienicanor Aguilar [facility]                        2022  
   
                                        No cough            Medical Established   
Patient with   
Doreen Deborah CNP                        2022  
   
                                                    Z68.43 - Body mass index [BM  
I]   
50.0-59.9, adult                        Medical Established Patient with   
Doreen Deborah CNP                        2022  
   
                                                    Borderline personality disor  
danny Pt   
reported hx of sx/dx                     Established Patient with Haylie [facility]                        2022  
   
                                                    Assessment of body mass inde  
x [Body   
mass index [BMI] 50.0-59.9, adult]      Open Access - Established with   
Julieth Aliya CNP                        2022  
   
                                                    Diabetes Risk Test Score was  
 three   
score 2022                         Open Access - Established with   
Julieth Aliya CNP                        2022  
   
                                                    Bipolar I disorder, most rec  
ent   
episode, manic                           Established Patient with   
Avis Felder [facility]                2021  
   
                                        Borderline personality disorder  Estab  
lished Patient with   
Avis Felder [facility]                2021  
   
                                        Post-traumatic stress disorder  Establ  
ished Patient with   
Avis Felder [facility]                2021  
   
                                                    Assessment of visit for: bhargavi myles for   
human immunodeficiency virus            Medical Established Patient with   
Doreen Deborah CNP                        2021  
   
                                        Nicotine dependence Medical Established   
Patient with   
Doreen Deborah CNP                        2021  
   
                                        Tachycardia         Medical Established   
Patient with   
Doreen Deborah CNP                        2021  
   
                                                    Z68.43 - Body mass index [BM  
I]   
50.0-59.9, adult                        Medical Established Patient with   
Doreen Deborah CNP                        2021  
   
                                                    Bipolar I disorder, most rec  
ent   
episode, manic                           Established Patient with Leonie   
[facility]                             2021  
   
                                                    Borderline personality disor  
danny per   
patient reported history                BH Established Patient with Leonie   
[facility]                             2021  
   
                                        Nicotine dependence BH Established Patie  
nt with Leonie   
[facility]                             2021  
   
                                        Post-traumatic stress disorder BH Establ  
ished Patient with Leonie   
[facility]                             2021  
   
                                        Body mass index     Medical Established   
Patient with   
Doreen Deborah CNP                        2021  
   
                                        Morbid obesity      Medical Established   
Patient with   
Doreen Deborah CNP                        2021  
   
                                        Nicotine dependence uncomplicated Medica  
l Established Patient with   
Doreen Deborah CNP                        2021  
   
                                                    Z68.42 - Body mass index [BM  
I]   
45.0-49.9, adult                        Medical Established Patient with   
Doreen Deborah CNP                        2021  
   
                                Episodic mood disorders On Call with Pamela edwards CNP 2021  
   
                                                    Mood disorders, NOS per tabatha  
ent   
reported history                        BH Established Patient with Leonie   
[facility]                             2021  
   
                                        Post-traumatic stress disorder BH Establ  
ished Patient with Leonie   
[facility]                             2021  
   
                                        Morbid obesity      Medical Established   
Patient with   
Doreen Deborah CNP                        2021  
   
                                        Otitis externa      Medical Established   
Patient with   
Doreen Deborah CNP                        2021  
   
                                                    Z68.42 - Body mass index [BM  
I]   
45.0-49.9, adult                        Medical Established Patient with   
Doreen Deborah CNP                        2021  
   
                                        Post-traumatic stress disorder BH Establ  
ished Patient with Leonie   
[facility]                             06/10/2021  
   
                                        Morbid obesity      Medical Established   
Patient with   
Doreen Deborah CNP                        06/10/2021  
   
                                                    Z68.42 - Body mass index [BM  
I]   
45.0-49.9, adult                        Medical Established Patient with   
Doreen Deborah CNP                        06/10/2021  
   
                                        Post-traumatic stress disorder BH Establ  
ished Patient with Leonie   
[facility]                             2021  
   
                                                    Assessment of visit for: bhargavi myles for   
human immunodeficiency virus            Medical New Patient with Doreen   
Deborah [facility]                              2021  
   
                                                    Diabetes Risk Test Score was  
 one score   
2021                               Medical New Patient with Doreen   
Deborah [facility]                              2021  
   
                                        Hypertension        Medical New Patient   
with Doreen   
[facility]ah [facility]                              2021  
   
                                        Morbid obesity      Medical New Patient   
with Doreen   
Deborah [facility]                              2021  
   
                                        Post-traumatic stress disorder Medical N  
ew Patient with Doreen   
[facility]                              2021  
   
                                                    Z68.42 - Body mass index [BM  
I]   
45.0-49.9, adult                        Medical New Patient with Doreenpaola Cabrera [facility]                              2021  
  
[facility]  
Work Phone: [phone]- Evaluation note  
  
Includes: Assessments for all patient encounters  
  
  
  
                                Findings        Encounter       Date  
   
                                                    Bipolar affective disorder,   
current   
episode depressed, severe with   
psychosis                                Telebehavioral Health with Haylienicanor Martinerson [facility]                        2022  
   
                                                    Assessment of body mass inde  
x [Body   
mass index [BMI] 50.0-59.9, adult]      Open Access - Established with   
Julieth Aliya CNP                        2022  
   
                                        Post-traumatic stress disorder  Establ  
ished Patient with Haylie   
Aguilar [facility]                        2022  
   
                                        No cough            Medical Established   
Patient with   
Doreen Deborah [facility]                        2022  
   
                                                    Z68.43 - Body mass index [BM  
I]   
50.0-59.9, adult                        Medical Established Patient with   
Doreen Deborah [facility]                        2022  
   
                                                    Borderline personality disor  
danny Pt   
reported hx of sx/dx                     Established Patient with Haylie   
[facility]gu[facility]                        2022  
   
                                                    Assessment of body mass inde  
x [Body   
mass index [BMI] 50.0-59.9, adult]      Open Access - Established with   
Julieth Aliya CNP                        2022  
   
                                                    Diabetes Risk Test Score was  
 three   
score 2022                         Open Access - Established with   
Julieth Aliya CNP                        2022  
   
                                                    Bipolar I disorder, most rec  
ent   
episode, manic                           Established Patient with   
Avis Felder [facility]                2021  
   
                                        Borderline personality disorder  Estab  
lished Patient with   
Avis Felder [facility]                2021  
   
                                        Post-traumatic stress disorder  Establ  
ished Patient with   
Avis Felder [facility]                2021  
   
                                                    Assessment of visit for: bhargavi myles for   
human immunodeficiency virus            Medical Established Patient with   
Doreen Deborah [facility]                        2021  
   
                                        Nicotine dependence Medical Established   
Patient with   
Doreen Deborah [facility]                        2021  
   
                                        Tachycardia         Medical Established   
Patient with   
Doreen Deborah [facility]                        2021  
   
                                                    Z68.43 - Body mass index [BM  
I]   
50.0-59.9, adult                        Medical Established Patient with   
Doreen Deborah CNP                        2021  
   
                                                    Bipolar I disorder, most rec  
ent   
episode, manic                           Established Patient with Leonie   
[facility]                             2021  
   
                                                    Borderline personality disor  
danny per   
patient reported history                BH Established Patient with Leonie   
[facility]                             2021  
   
                                        Nicotine dependence BH Established Patie  
nt with Leonie   
[facility]                             2021  
   
                                        Post-traumatic stress disorder  Establ  
ished Patient with Leonie   
[facility]                             2021  
   
                                        Body mass index     Medical Established   
Patient with   
Doreen Deborah CNP                        2021  
   
                                        Morbid obesity      Medical Established   
Patient with   
Doreen Deborah CNP                        2021  
   
                                        Nicotine dependence uncomplicated Medica  
l Established Patient with   
Doreen Deborah CNP                        2021  
   
                                                    Z68.42 - Body mass index [BM  
I]   
45.0-49.9, adult                        Medical Established Patient with   
Doreen Deborah CNP                        2021  
   
                                Episodic mood disorders On Call with Pamela edwards CNP 2021  
   
                                                    Mood disorders, NOS per tabatha  
ent   
reported history                         Established Patient with Leonie   
[facility]                             2021  
   
                                        Post-traumatic stress disorder  Establ  
ished Patient with Leonie   
[facility]                             2021  
   
                                        Morbid obesity      Medical Established   
Patient with   
Doreen Deborah CNP                        2021  
   
                                        Otitis externa      Medical Established   
Patient with   
Doreen Deborah CNP                        2021  
   
                                                    Z68.42 - Body mass index [BM  
I]   
45.0-49.9, adult                        Medical Established Patient with   
Doreen Deborah CNP                        2021  
   
                                        Post-traumatic stress disorder  Establ  
ished Patient with Leonie   
[facility]                             06/10/2021  
   
                                        Morbid obesity      Medical Established   
Patient with   
Doreen Deborah CNP                        06/10/2021  
   
                                                    Z68.42 - Body mass index [BM  
I]   
45.0-49.9, adult                        Medical Established Patient with   
Doreen Deborah CNP                        06/10/2021  
   
                                        Post-traumatic stress disorder BH Establ  
ished Patient with Leonie   
[facility]                             2021  
   
                                                    Assessment of visit for: bhargavi myles for   
human immunodeficiency virus            Medical New Patient with Doreen Cabrera CNP                              2021  
   
                                                    Diabetes Risk Test Score was  
 one score   
2021                               Medical New Patient with Doreen   
Deborah [facility]NP                              2021  
   
                                        Hypertension        Medical New Patient   
with Doreen Cabrera [facility]                              2021  
   
                                        Morbid obesity      Medical New Patient   
with Doreen [facility]                              2021  
   
                                        Post-traumatic stress disorder Medical N  
ew Patient with Doreen Cabrera [facility]                              2021  
   
                                                    Z68.42 - Body mass index [BM  
I]   
45.0-49.9, adult                        Medical New Patient with Doreen Cabrera [facility]                              2021  
  
[facility]  
Work Phone: [phone]- Evaluation note  
  
Includes: Assessments for all patient encounters  
  
  
  
                                Findings        Encounter       Date  
   
                                                    Bipolar affective disorder,   
current   
episode depressed, severe with   
psychosis                                Telebehavioral Health with Haylienicanor Martinerson [facility]                        2022  
   
                                                    Assessment of body mass inde  
x [Body   
mass index [BMI] 50.0-59.9, adult]      [facility] - Established with   
Julieth Aliya CNP                        2022  
   
                                                    Bipolar I disorder, most rec  
ent   
episode, manic                          [facility] - Established with   
Julieth Aliya CNP                        2022  
   
                                        Post-traumatic stress disorder  Establ  
ished Patient with [facility]lienicanor Martinerson [facility]                        2022  
   
                                        No cough            Medical Established   
Patient with   
[facility]oreen [facility]rera [facility]                        2022  
   
                                                    Z68.43 - Body mass index [BM  
I]   
50.0-59.9, adult                        Medical Established Patient with   
Doreen Cabrera [facility]                        2022  
   
                                                    Borderline personality disor  
danny Pt   
reported hx of sx/dx                     [facility]                        20[facility]  
   
                                                    Assessment of body mass inde  
x [Body   
mass index [BMI] 50.0-59.9, adult]      [facility] - Established with   
Julieth Aliya CNP                        2022  
   
                                                    Diabetes Risk Test Score was  
 three   
score 2022                         [facility] - Established with   
Julieth Aliya CNP                        2022  
   
                                                    Bipolar I disorder, most rec  
ent   
episode, manic                           Established Patient with   
[facility]                2021  
   
                                        Borderline personality disorder  Estab  
lished Patient with   
[facility]                2021  
   
                                        Post-traumatic stress disorder  Establ  
ished Patient with   
[facility]                2021  
   
                                                    Assessment of visit for: bhargavi myles for   
human immunodeficiency virus            Medical Established Patient with   
Doreen Deborah CNP                        2021  
   
                                        Nicotine dependence Medical Established   
Patient with   
Doreen Deborah CNP                        2021  
   
                                        Tachycardia         Medical Established   
Patient with   
Doreen Deborah CNP                        2021  
   
                                                    Z68.43 - Body mass index [BM  
I]   
50.0-59.9, adult                        Medical Established Patient with   
Doreen Deborah CNP                        2021  
   
                                                    Bipolar I disorder, most rec  
ent   
episode, manic                           Established Patient with Leonie   
[facility]                             2021  
   
                                                    Borderline personality disor  
danny per   
patient reported history                 Established Patient with Leonie   
[facility]                             2021  
   
                                        Nicotine dependence BH Established Patie  
nt with Leonie   
[facility]                             2021  
   
                                        Post-traumatic stress disorder  Establ  
ished Patient with Leonie   
[facility]                             2021  
   
                                        Body mass index     Medical Established   
Patient with   
Doreenpaola Mccormacken CNP                        2021  
   
                                        Morbid obesity      Medical Established   
Patient with   
Doreen Deborah CNP                        2021  
   
                                        Nicotine dependence uncomplicated Medica  
l Established Patient with   
Doreen Deborah CNP                        2021  
   
                                                    Z68.42 - Body mass index [BM  
I]   
45.0-49.9, adult                        Medical Established Patient with   
Doreen Deborah CNP                        2021  
   
                                Episodic mood disorders On Call with Pamela edwards CNP 2021  
   
                                                    Mood disorders, NOS per tabatha  
ent   
reported history                         Established Patient with Leonie   
[facility]                             2021  
   
                                        Post-traumatic stress disorder  Establ  
ished Patient with Leonie   
[facility]                             2021  
   
                                        Morbid obesity      Medical Established   
Patient with   
Doreen Deborah CNP                        2021  
   
                                        Otitis externa      Medical Established   
Patient with   
Doreen Deborah CNP                        2021  
   
                                                    Z68.42 - Body mass index [BM  
I]   
45.0-49.9, adult                        Medical Established Patient with   
Doreen Deborah CNP                        2021  
   
                                        Post-traumatic stress disorder  Establ  
ished Patient with Leonie   
[facility]                             06/10/2021  
   
                                        Morbid obesity      Medical Established   
Patient with   
Doreen Deborah CNP                        06/10/2021  
   
                                                    Z68.42 - Body mass index [BM  
I]   
45.0-49.9, adult                        Medical Established Patient with   
Doreen Deborah CNP                        06/10/2021  
   
                                        Post-traumatic stress disorder  Establ  
ished Patient with Leonie   
[facility]                             2021  
   
                                                    Assessment of visit for: bhargavi myles for   
human immunodeficiency virus            Medical New Patient with [facility]en [facility]rera [facility]                              2021  
   
                                                    Diabetes Risk Test Score was  
 one score   
2021                               Medical New Patient with Doreen   
Deborah [facility]                              2021  
   
                                        Hypertension        Medical New Patient   
with Doreen Cabrera [facility]                              2021  
   
                                        Morbid obesity      Medical New Patient   
with Doreen   
Deborah [facility]                              2021  
   
                                        Post-traumatic stress disorder Medical N  
ew Patient with Doreen Cabrera [facility]                              2021  
   
                                                    Z68.42 - Body mass index [BM  
I]   
45.0-49.9, adult                        Medical New Patient with Doreen Cabrera [facility]                              2021  
  
[facility]  
Work Phone: [phone]- Evaluation note  
  
Includes: Assessments for all patient encounters  
  
  
  
                                Findings        Encounter       Date  
   
                                                    Bipolar affective disorder,   
current   
episode manic                            [facility]                        20[facility]  
   
                                                    Bipolar affective disorder,   
current   
episode depressed, severe with   
psychosis                                Telebehavioral Health with Haylienicanor Martinerson [facility]                        2022  
   
                                                    Assessment of body mass inde  
x [Body   
mass index [BMI] 50.0-59.9, adult]      [facility] - Established with   
Julieth Pisano CNP                        2022  
   
                                                    Bipolar I disorder, most rec  
ent   
episode, manic                          [facility] - Established with   
Julieth Aliya CNP                        2022  
   
                                        Post-traumatic stress disorder BH Establ  
ished Patient [facility]                        20[facility]  
   
                                        No cough            Medical Established   
Patient with   
Doreen Deborah [facility]                        2022  
   
                                                    Z68.43 - Body mass index [BM  
I]   
50.0-59.9, adult                        Medical Established Patient with   
Doreen Deborah [facility]                        2022  
   
                                                    Borderline personality disor  
danny Pt   
reported hx of sx/dx                     Est[facility]                        20[facility]  
   
                                                    Assessment of body mass inde  
x [Body   
mass index [BMI] 50.0-59.9, adult]      [facility] - Established with   
Julieth Aliyasteven [facility]                        2022  
   
                                                    Diabetes Risk Test Score was  
 three   
score 2022                         [facility] - Established with   
Julieth Aliya CNP                        2022  
   
                                                    Bipolar I disorder, most rec  
ent   
episode, manic                          BH Established Patient with   
[facility]                2021  
   
                                        Borderline personality disorder BH Estab  
lished Patient with   
[facility]vis [facility]                2021  
   
                                        Post-traumatic stress disorder BH Establ  
ished Patient with   
[facility]vis [facility]der [facility]                2021  
   
                                                    Assessment of visit for: bhargavi myles for   
human immunodeficiency virus            Medical Established Patient with   
Doreen Deborah CNP                        2021  
   
                                        Nicotine dependence Medical Established   
Patient with   
Doreen Deborah CNP                        2021  
   
                                        Tachycardia         Medical Established   
Patient with   
Doreen Deborah CNP                        2021  
   
                                                    Z68.43 - Body mass index [BM  
I]   
50.0-59.9, adult                        Medical Established Patient with   
Doreen Deborah CNP                        2021  
   
                                                    Bipolar I disorder, most rec  
ent   
episode, manic                           Established Patient with Leonie   
[facility]                             2021  
   
                                                    Borderline personality disor  
danny per   
patient reported history                BH Established Patient with Leonie   
[facility]                             2021  
   
                                        Nicotine dependence BH Established Patie  
nt with Leonie   
[facility]                             2021  
   
                                        Post-traumatic stress disorder BH Establ  
ished Patient with Leonie   
[facility]                             2021  
   
                                        Body mass index     Medical Established   
Patient with   
Doreen Deborah CNP                        2021  
   
                                        Morbid obesity      Medical Established   
Patient with   
Doreen Deborah CNP                        2021  
   
                                        Nicotine dependence uncomplicated Medica  
l Established Patient with   
Doreen Deborah CNP                        2021  
   
                                                    Z68.42 - Body mass index [BM  
I]   
45.0-49.9, adult                        Medical Established Patient with   
Doreen Deborah CNP                        2021  
   
                                Episodic mood disorders On Call with Pamela edwards CNP 2021  
   
                                                    Mood disorders, NOS per tabatha  
ent   
reported history                        BH Established Patient with Leonie   
[facility]                             2021  
   
                                        Post-traumatic stress disorder BH Establ  
ished Patient with Leonie   
[facility]                             2021  
   
                                        Morbid obesity      Medical Established   
Patient with   
Doreen Deborah CNP                        2021  
   
                                        Otitis externa      Medical Established   
Patient with   
Doreen Deborah CNP                        2021  
   
                                                    Z68.42 - Body mass index [BM  
I]   
45.0-49.9, adult                        Medical Established Patient with   
Doreen Deborah CNP                        2021  
   
                                        Post-traumatic stress disorder BH Establ  
ished Patient with Leonie   
[facility]                             06/10/2021  
   
                                        Morbid obesity      Medical Established   
Patient with   
Doreen Deborah CNP                        06/10/2021  
   
                                                    Z68.42 - Body mass index [BM  
I]   
45.0-49.9, adult                        Medical Established Patient with   
Doreen Deborah CNP                        06/10/2021  
   
                                        Post-traumatic stress disorder BH Establ  
ished Patient with Leonie   
[facility]                             2021  
   
                                                    Assessment of visit for: bhargavi myles for   
human immunodeficiency virus            Medical New Patient with Doreen   
Deborah CNP                              2021  
   
                                                    Diabetes Risk Test Score was  
 one score   
2021                               Medical New Patient with Doreen   
Deborah CNP                              2021  
   
                                        Hypertension        Medical New Patient   
with Doreen   
Deborah CNP                              2021  
   
                                        Morbid obesity      Medical New Patient   
with Doreen   
Deborah CNP                              2021  
   
                                        Post-traumatic stress disorder Medical N  
ew Patient with Doreen   
Deborah CNP                              2021  
   
                                                    Z68.42 - Body mass index [BM  
I]   
45.0-49.9, adult                        Medical New Patient with Doreen   
Deborah CNP                              2021  
  
[facility]""  
Work Phone: [phone]- Evaluation note  
  
Includes: Assessments for all patient encounters  
  
  
  
                                Findings        Encounter       Date  
   
                                        Post-traumatic stress disorder BH Establ  
ished Patient with [facility]icanor [facility]                        2022  
   
                                        No cough            Medical Established   
Patient with   
Doreen Deborah CNP                        2022  
   
                                                    Z68.43 - Body mass index [BM  
I]   
50.0-59.9, adult                        Medical Established Patient with   
Doreen Deborah CNP                        2022  
   
                                                    Borderline personality disor  
danny Pt   
reported hx of sx/dx                     Established Patient with Haylie   
[facility]gu[facility]                        2022  
   
                                                    Assessment of body mass inde  
x [Body mass   
index [BMI] 50.0-59.9, adult]           [facility] - Established with   
Julieth Pisano CNP                        2022  
   
                                                    Diabetes Risk Test Score was  
 three score   
2022                               [facility] - Established with   
Julieth Aliya CNP                        2022  
   
                                                    Bipolar I disorder, most rec  
ent episode,   
manic                                    Established Patient with   
[facility]vis [facility]der [facility]                2021  
   
                                        Borderline personality disorder  Estab  
lished Patient with   
Avissharmila Felder [facility]                2021  
   
                                        Post-traumatic stress disorder BH Establ  
ished Patient with   
Avis Felder [facility]                2021  
   
                                                    Assessment of visit for: bhargavi myles for   
human immunodeficiency virus            Medical Established Patient with   
Doreen Deborah CNP                        2021  
   
                                        Nicotine dependence Medical Established   
Patient with   
Doreen Deborah CNP                        2021  
   
                                        Tachycardia         Medical Established   
Patient with   
Doreen Deborah CNP                        2021  
   
                                                    Z68.43 - Body mass index [BM  
I]   
50.0-59.9, adult                        Medical Established Patient with   
Doreen Deborah CNP                        2021  
   
                                                    Bipolar I disorder, most rec  
ent episode,   
manic                                    Established Patient with   
Leonie Short [facility]                     2021  
   
                                                    Borderline personality disor  
danny per   
patient reported history                BH Established Patient with   
Leonie Short [facility]                     2021  
   
                                        Nicotine dependence BH Established Patie  
nt with   
Leonie Short [facility]                     2021  
   
                                        Post-traumatic stress disorder BH Establ  
ished Patient with   
Leonie Short [facility]                     2021  
   
                                        Body mass index     Medical Established   
Patient with   
Doreen Dbeorah CNP                        2021  
   
                                        Morbid obesity      Medical Established   
Patient with   
Doreen Deborah CNP                        2021  
   
                                        Nicotine dependence uncomplicated Medica  
l Established Patient with   
Doreen Deborah CNP                        2021  
   
                                                    Z68.42 - Body mass index [BM  
I]   
45.0-49.9, adult                        Medical Established Patient with   
Doreen Deborah CNP                        2021  
   
                                Episodic mood disorders On Call with Pamela edwards CNP 2021  
   
                                                    Mood disorders, NOS per tabatha  
ent reported   
history                                 BH Established Patient with   
Leonie Short [facility]                     2021  
   
                                        Post-traumatic stress disorder BH Establ  
ished Patient with   
Leonie Short [facility]                     2021  
   
                                        Morbid obesity      Medical Established   
Patient with   
Doreen Deborah CNP                        2021  
   
                                        Otitis externa      Medical Established   
Patient with   
Doreen Deborah CNP                        2021  
   
                                                    Z68.42 - Body mass index [BM  
I]   
45.0-49.9, adult                        Medical Established Patient with   
Doreen Deborah CNP                        2021  
   
                                        Post-traumatic stress disorder BH Establ  
ished Patient with   
Leonie Short [facility]                     06/10/2021  
   
                                        Morbid obesity      Medical Established   
Patient with   
Doreen Deborah CNP                        06/10/2021  
   
                                                    Z68.42 - Body mass index [BM  
I]   
45.0-49.9, adult                        Medical Established Patient with   
Doreen [facility]rera [facility]                        06/10/2021  
   
                                        Post-traumatic stress disorder BH Establ  
ished Patient with   
Leonie Garrett [facility]                     2021  
   
                                                    Assessment of visit for: bhargavi guevaraning for   
human immunodeficiency virus            Medical New Patient with Doreen [facility]rera [facility]                              2021  
   
                                                    Diabetes Risk Test Score was  
 one score   
2021                               Medical New Patient with Doreen [facility]rera [facility]                              2021  
   
                                        Hypertension        Medical New Patient   
with Doreen Cabrera [facility]                              2021  
   
                                        Morbid obesity      Medical New Patient   
with Doreen Cabrera [facility]                              2021  
   
                                        Post-traumatic stress disorder Medical N  
ew Patient with Doreen Cabrera [facility]                              2021  
   
                                                    Z68.42 - Body mass index [BM  
I]   
45.0-49.9, adult                        Medical New Patient with Doreen Cabrera [facility]                              2021  
  
[facility]  
Work Phone: [phone]- History general Narrative - Reported  
  
Includes: Medical History in patient's chart  
  
  
  
                                        Description         Last Updated  
   
                                        Has sex without a condom 2022  
   
                                        Not planning to have a baby in the next   
12 months 2022  
   
                                        Partners sexually transmitted infection   
status known 2022  
   
                                        Previously pregnant 0 time(s) 2022  
   
                                        No previous hospitalizations 2022  
   
                                        Chronic illness     2021  
   
                                        Exposure to COVID-19 2021  
   
                                        History of gynecologic disorder 20  
21  
   
                                        History of Polycystic Ovarian Syndrome (  
PCOS) 2021  
   
                                        History of anxiety disorder NOS 20  
21  
   
                                        History of migraine headache 2021  
   
                                        History of psychiatric disorders biopola  
r disorder 2021  
  
[facility] \[facility] [place] [facility]\""  
Work Phone: [phone]- Evaluation note  
  
Includes: Assessments for all patient encounters  
  
  
  
                                Findings        Encounter       Date  
   
                                                    Borderline personality disor  
danny Pt   
reported hx of sx/dx                    BH Established Patient with Haylie Aguilar [facility]                        2022  
   
                                                    Assessment of body mass inde  
x [Body mass   
index [BMI] 50.0-59.9, adult]           [facility] - Established with   
Julieth Pisano CNP                        2022  
   
                                                    Diabetes Risk Test Score was  
 three score   
2022                               Open Access - Established with   
Julieth Pisano CNP                        2022  
   
                                                    Bipolar I disorder, most rec  
ent episode,   
manic                                    Established Patient with   
Avis Felder [facility]                2021  
   
                                        Borderline personality disorder BH Estab  
lished Patient with   
Avis Felder [facility]                2021  
   
                                        Post-traumatic stress disorder BH Establ  
ished Patient with   
Vais Felder [facility]                2021  
   
                                                    Assessment of visit for: bhargavi myles for   
human immunodeficiency virus            Medical Established Patient with   
Doreen Deborah CNP                        2021  
   
                                        Nicotine dependence Medical Established   
Patient with   
Doreen Deborah CNP                        2021  
   
                                        Tachycardia         Medical Established   
Patient with   
Doreen Deborah CNP                        2021  
   
                                                    Z68.43 - Body mass index [BM  
I]   
50.0-59.9, adult                        Medical Established Patient with   
Doreen Deborah CNP                        2021  
   
                                                    Bipolar I disorder, most rec  
ent episode,   
manic                                    Established Patient with   
Leonie Short [facility]                     2021  
   
                                                    Borderline personality disor  
danny per   
patient reported history                BH Established Patient with   
Leonie Short [facility]                     2021  
   
                                        Nicotine dependence BH Established Patie  
nt with   
Leonie Short [facility]                     2021  
   
                                        Post-traumatic stress disorder  Establ  
ished Patient with   
Leonie Short [facility]                     2021  
   
                                        Body mass index     Medical Established   
Patient with   
Doreen Deborah CNP                        2021  
   
                                        Morbid obesity      Medical Established   
Patient with   
Doreen Deborah CNP                        2021  
   
                                        Nicotine dependence uncomplicated Medica  
l Established Patient with   
Doreen Deborah CNP                        2021  
   
                                                    Z68.42 - Body mass index [BM  
I]   
45.0-49.9, adult                        Medical Established Patient with   
Doreen Deborah CNP                        2021  
   
                                Episodic mood disorders On Call with Pamela edwards CNP 2021  
   
                                                    Mood disorders, NOS per tabatha  
ent reported   
history                                 BH Established Patient with   
Leonie Short [facility]                     2021  
   
                                        Post-traumatic stress disorder BH Establ  
ished Patient with   
Leonie Short [facility]                     2021  
   
                                        Morbid obesity      Medical Established   
Patient with   
Doreen Deborah CNP                        2021  
   
                                        Otitis externa      Medical Established   
Patient with   
Doreen Deborah CNP                        2021  
   
                                                    Z68.42 - Body mass index [BM  
I]   
45.0-49.9, adult                        Medical Established Patient with   
Doreen Cabrera CNP                        2021  
   
                                        Post-traumatic stress disorder BH Establ  
ished Patient with   
Leonie Short [facility]                     06/10/2021  
   
                                        Morbid obesity      Medical Established   
Patient with   
Doreenpaola Cabrera CNP                        06/10/2021  
   
                                                    Z68.42 - Body mass index [BM  
I]   
45.0-49.9, adult                        Medical Established Patient with   
Doreen Cabrera CNP                        06/10/2021  
   
                                        Post-traumatic stress disorder BH Establ  
ished Patient with   
Leonie Short [facility]                     2021  
   
                                                    Assessment of visit for: bhargavi myles for   
human immunodeficiency virus            Medical New Patient with Doreenpaola Mccormacken CNP                              2021  
   
                                                    Diabetes Risk Test Score was  
 one score   
2021                               Medical New Patient with Doreenpaola Cabrera CNP                              2021  
   
                                        Hypertension        Medical New Patient   
with Doreen   
Deborah CNP                              2021  
   
                                        Morbid obesity      Medical New Patient   
with Doreen Cabrera CNP                              2021  
   
                                        Post-traumatic stress disorder Medical N  
ew Patient with Doreen   
Deborah CNP                              2021  
   
                                                    Z68.42 - Body mass index [BM  
I]   
45.0-49.9, adult                        Medical New Patient with Doreenpaola Cabrera CNP                              2021  
  
[facility]  
Work Phone: [phone]- Reason for referral (narrative)*   
  
                          Date         Encounter Description Provider     Reason  
 for Referral  
   
                          22      Established Patient Haylie Aguilar GRIFFIN  
CC-S Referral To Mental Health  
 Team  
   
                          21     Medical New Patient Doreen Cabrera CNP Refe  
rral To Mental Health Team  
  
  
[facility]  
Work Phone: [phone]- History of Present illness Narrative* Rosie Goldberg - 10/04/2021 1:30 PM EDT  
  
Formatting of this note might be different from the original.  
Explained Holter monitor and diary.  
  
  
  
Electronically signed by Rosie Goldberg at 10/04/2021 2:23 PM EDT  
  
  
[facility]  
Work Phone: [phone]- Evaluation note  
  
Includes: Assessments for all patient encounters  
  
  
  
                                Findings        Encounter       Date  
   
                                                    Bipolar I disorder, most rec  
ent episode,   
manic                                   BH Established Patient with   
Avis Felder [facility]                2021  
   
                                        Borderline personality disorder BH Estab  
lished Patient with   
Avis Bandamons [facility]                2021  
   
                                                    Assessment of visit for: bhargavi myles for   
human immunodeficiency virus            Medical Established Patient with   
Doreen Deborah CNP                        2021  
   
                                        Nicotine dependence Medical Established   
Patient with   
Doreen Deborah CNP                        2021  
   
                                        Tachycardia         Medical Established   
Patient with   
Doreen Deborah CNP                        2021  
   
                                                    Z68.43 - Body mass index [BM  
I]   
50.0-59.9, adult                        Medical Established Patient with   
Doreen Deborah CNP                        2021  
   
                                                    Bipolar I disorder, most rec  
ent episode,   
manic                                    Established Patient with   
Leonie Short [facility]                     2021  
   
                                                    Borderline personality disor  
danny per   
patient reported history                 Established Patient with   
Leonie Short [facility]                     2021  
   
                                        Nicotine dependence BH Established Patie  
nt with   
Leonie Short [facility]                     2021  
   
                                        Post-traumatic stress disorder  Establ  
ished Patient with   
Leonie Short [facility]                     2021  
   
                                        Body mass index     Medical Established   
Patient with   
Doreen Deborah CNP                        2021  
   
                                        Morbid obesity      Medical Established   
Patient with   
Doreen Deborah CNP                        2021  
   
                                        Nicotine dependence uncomplicated Medica  
l Established Patient with   
Doreen Deborah CNP                        2021  
   
                                                    Z68.42 - Body mass index [BM  
I]   
45.0-49.9, adult                        Medical Established Patient with   
Doreen Deborah CNP                        2021  
   
                                Episodic mood disorders On Call with Pamela edwards CNP 2021  
   
                                                    Mood disorders, NOS per tabatha  
ent reported   
history                                  Established Patient with   
Leonie Short [facility]                     2021  
   
                                        Post-traumatic stress disorder  Establ  
ished Patient with   
Leonie Short [facility]                     2021  
   
                                        Morbid obesity      Medical Established   
Patient with   
Doreen Deborah CNP                        2021  
   
                                        Otitis externa      Medical Established   
Patient with   
Doreen Deborah CNP                        2021  
   
                                                    Z68.42 - Body mass index [BM  
I]   
45.0-49.9, adult                        Medical Established Patient with   
Doreen Deborah CNP                        2021  
   
                                        Post-traumatic stress disorder BH Establ  
ished Patient with   
Leonie Short [facility]                     06/10/2021  
   
                                        Morbid obesity      Medical Established   
Patient with   
Doreen Deborah CNP                        06/10/2021  
   
                                                    Z68.42 - Body mass index [BM  
I]   
45.0-49.9, adult                        Medical Established Patient with   
Doreen Deborah CNP                        06/10/2021  
   
                                        Post-traumatic stress disorder  Establ  
ished Patient with   
Leonie Short [facility]                     2021  
   
                                                    Assessment of visit for: scr  
eening for   
human immunodeficiency virus            Medical New Patient with Doreenpaola Mccormacken CNP                              2021  
   
                                                    Diabetes Risk Test Score was  
 one score   
2021                               Medical New Patient with Doreen   
Deborah CNP                              2021  
   
                                        Hypertension        Medical New Patient   
with Doreen   
Deborah CNP                              2021  
   
                                        Morbid obesity      Medical New Patient   
with Doreen   
Deborah CNP                              2021  
   
                                        Post-traumatic stress disorder Medical N  
ew Patient with Doreen   
Deborah CNP                              2021  
   
                                                    Z68.42 - Body mass index [BM  
I]   
45.0-49.9, adult                        Medical New Patient with Doreen   
Deborah CNP                              2021  
  
[facility] \[facility] [place] [facility]\""  
Work Phone: [phone]- Evaluation note  
  
Includes: Assessments for all patient encounters  
  
  
  
                                Findings        Encounter       Date  
   
                                                    Bipolar I disorder, most rec  
ent episode,   
manic                                    Established Patient with   
Avis Felder [facility]                2021  
   
                                        Borderline personality disorder  Estab  
lished Patient with   
Avis Felder [facility]                2021  
   
                                        Post-traumatic stress disorder  Establ  
ished Patient with   
Avis Felder [facility]                2021  
   
                                                    Assessment of visit for: scr  
eening for   
human immunodeficiency virus            Medical Established Patient with   
Doreen Deborah CNP                        2021  
   
                                        Nicotine dependence Medical Established   
Patient with   
Doreen Deborah CNP                        2021  
   
                                        Tachycardia         Medical Established   
Patient with   
Doreen Deborah CNP                        2021  
   
                                                    Z68.43 - Body mass index [BM  
I]   
50.0-59.9, adult                        Medical Established Patient with   
Doreen Deborah CNP                        2021  
   
                                                    Bipolar I disorder, most rec  
ent episode,   
manic                                    Established Patient with   
Leonie Short [facility]                     2021  
   
                                                    Borderline personality disor  
danny per   
patient reported history                BH Established Patient with   
Leonie Short [facility]                     2021  
   
                                        Nicotine dependence  Established Patie  
nt with   
Leonie Short [facility]                     2021  
   
                                        Post-traumatic stress disorder BH Establ  
ished Patient with   
Leonie Short [facility]                     2021  
   
                                        Body mass index     Medical Established   
Patient with   
Doreen Deborah CNP                        2021  
   
                                        Morbid obesity      Medical Established   
Patient with   
Doreen Deborah CNP                        2021  
   
                                        Nicotine dependence uncomplicated Medica  
l Established Patient with   
Doreen Deborah CNP                        2021  
   
                                                    Z68.42 - Body mass index [BM  
I]   
45.0-49.9, adult                        Medical Established Patient with   
Doreen Deborah CNP                        2021  
   
                                Episodic mood disorders On Call with Pamela edwards CNP 2021  
   
                                                    Mood disorders, NOS per tabatha  
ent reported   
history                                 BH Established Patient with   
Leonie Short [facility]                     2021  
   
                                        Post-traumatic stress disorder BH Establ  
ished Patient with   
Leonie Short [facility]                     2021  
   
                                        Morbid obesity      Medical Established   
Patient with   
Doreen Deborah CNP                        2021  
   
                                        Otitis externa      Medical Established   
Patient with   
Doreen Deborah CNP                        2021  
   
                                                    Z68.42 - Body mass index [BM  
I]   
45.0-49.9, adult                        Medical Established Patient with   
Doreen Deborah CNP                        2021  
   
                                        Post-traumatic stress disorder BH Establ  
ished Patient with   
Leonie Short [facility]                     06/10/2021  
   
                                        Morbid obesity      Medical Established   
Patient with   
Doreen Deborah CNP                        06/10/2021  
   
                                                    Z68.42 - Body mass index [BM  
I]   
45.0-49.9, adult                        Medical Established Patient with   
Doreen Deborah CNP                        06/10/2021  
   
                                        Post-traumatic stress disorder BH Establ  
ished Patient with   
Leonie Short [facility]                     2021  
   
                                                    Assessment of visit for: bhargavi myles for   
human immunodeficiency virus            Medical New Patient with Doreen   
Deborah CNP                              2021  
   
                                                    Diabetes Risk Test Score was  
 one score   
2021                               Medical New Patient with Doreen   
Deborah CNP                              2021  
   
                                        Hypertension        Medical New Patient   
with Doreen   
Deborah CNP                              2021  
   
                                        Morbid obesity      Medical New Patient   
with Doreen   
Deborah [facility]                              2021  
   
                                        Post-traumatic stress disorder Medical N  
ew Patient with Doreen   
Deborah CNP                              2021  
   
                                                    Z68.42 - Body mass index [BM  
I]   
45.0-49.9, adult                        Medical New Patient with Doreen   
Deborah [facility]                              2021  
  
[facility] \[facility] [place] [facility]\""  
Work Phone: [phone]- Evaluation note  
  
Includes: Assessments for all patient encounters  
  
  
  
                                Findings        Encounter       Date  
   
                                                    Bipolar I disorder, most rec  
ent episode,   
manic                                    Established Patient with   
Leonie Short [facility]                     2021  
   
                                                    Borderline personality disor  
danny per   
patient reported history                 Established Patient with   
Leonie Short [facility]                     2021  
   
                                        Nicotine dependence  Established Patie  
nt with   
Leonie Short [facility]                     2021  
   
                                        Post-traumatic stress disorder  Establ  
ished Patient with   
Leonie Short [facility]                     2021  
   
                                        Body mass index     Medical Established   
Patient with   
Doreen Deborah CNP                        2021  
   
                                        Morbid obesity      Medical Established   
Patient with   
Doreen Deborah CNP                        2021  
   
                                        Nicotine dependence uncomplicated Medica  
l Established Patient with   
Doreen Deborah CNP                        2021  
   
                                                    Z68.42 - Body mass index [BM  
I]   
45.0-49.9, adult                        Medical Established Patient with   
Doreen Deborah CNP                        2021  
   
                                Episodic mood disorders On Call with Pamela edwards CNP 2021  
   
                                                    Mood disorders, NOS per tabatha  
ent reported   
history                                  Established Patient with   
Leonie Short [facility]                     2021  
   
                                        Post-traumatic stress disorder  Establ  
ished Patient with   
Leonie Short [facility]                     2021  
   
                                        Morbid obesity      Medical Established   
Patient with   
Doreen Deborah CNP                        2021  
   
                                        Otitis externa      Medical Established   
Patient with   
Doreen Deborah CNP                        2021  
   
                                                    Z68.42 - Body mass index [BM  
I]   
45.0-49.9, adult                        Medical Established Patient with   
Doreen Deborah CNP                        2021  
   
                                        Post-traumatic stress disorder  Establ  
ished Patient with   
Leonie Short [facility]                     06/10/2021  
   
                                        Morbid obesity      Medical Established   
Patient with   
Doreen Deborah CNP                        06/10/2021  
   
                                                    Z68.42 - Body mass index [BM  
I]   
45.0-49.9, adult                        Medical Established Patient with   
Doreen Deborah CNP                        06/10/2021  
   
                                        Post-traumatic stress disorder  Establ  
ished Patient with   
Leonie Short [facility]                     2021  
   
                                                    Assessment of visit for: bhargavi myles for   
human immunodeficiency virus            Medical New Patient with Doreen   
Deborah CNP                              2021  
   
                                                    Diabetes Risk Test Score was  
 one score   
2021                               Medical New Patient with Doreen Cabrera CNP                              2021  
   
                                        Hypertension        Medical New Patient   
with Doreen Cabrera CNP                              2021  
   
                                        Morbid obesity      Medical New Patient   
with Doreenpaola Mccormacken CNP                              2021  
   
                                        Post-traumatic stress disorder Medical N  
ew Patient with Doreen   
Deborah CNP                              2021  
   
                                                    Z68.42 - Body mass index [BM  
I]   
45.0-49.9, adult                        Medical New Patient with Doreen Cabrera CNP                              2021  
  
[facility]  
Work Phone: [phone]- History general Narrative - Reported  
  
Includes: Medical History in patient's chart  
  
  
  
                                        Description         Last Updated  
   
                                        Chronic illness     2021  
   
                                        Exposure to COVID-19 2021  
   
                                        Previous hospitalizations 2021  
   
                                        History of gynecologic disorder 20  
21  
   
                                        History of Polycystic Ovarian Syndrome (  
PCOS) 2021  
   
                                        History of anxiety disorder NOS 20  
21  
   
                                        History of migraine headache 2021  
   
                                        History of psychiatric disorders biopola  
r disorder 2021  
  
[facility]  
Work Phone: [phone]- Evaluation note  
  
Includes: Assessments for all patient encounters  
  
  
  
                                Findings        Encounter       Date  
   
                                Episodic mood disorders On Call with Pamela edwards CNP 2021  
   
                                                    Mood disorders, NOS per tabatha  
ent reported   
history                                  Established Patient with   
Leonie Short LISWS                     2021  
   
                                        Post-traumatic stress disorder  Establ  
ished Patient with   
Leonie Short LISWS                     2021  
   
                                        Morbid obesity      Medical Established   
Patient with   
Doreenpaola Mccormacken CNP                        2021  
   
                                        Otitis externa      Medical Established   
Patient with   
Doreen Deborah CNP                        2021  
   
                                                    Z68.42 - Body mass index [BM  
I]   
45.0-49.9, adult                        Medical Established Patient with   
Doreen Deborah CNP                        2021  
   
                                        Post-traumatic stress disorder BH Establ  
ished Patient with   
Leonie Short LISWS                     06/10/2021  
   
                                        Morbid obesity      Medical Established   
Patient with   
Doreen Deborah CNP                        06/10/2021  
   
                                                    Z68.42 - Body mass index [BM  
I]   
45.0-49.9, adult                        Medical Established Patient with   
Doreen Deborah CNP                        06/10/2021  
   
                                        Post-traumatic stress disorder BH Establ  
ished Patient with   
Leonie Short LISWS                     2021  
   
                                                    Assessment of visit for: bhargavi mylse for   
human immunodeficiency virus            Medical New Patient with Doreen   
Deborah CNP                              2021  
   
                                                    Diabetes Risk Test Score was  
 one score   
2021                               Medical New Patient with Doreen   
Deborah CNP                              2021  
   
                                        Hypertension        Medical New Patient   
with Doreen   
Deborah CNP                              2021  
   
                                        Morbid obesity      Medical New Patient   
with Doreen   
Deborah CNP                              2021  
   
                                        Post-traumatic stress disorder Medical N  
ew Patient with Doreen   
Deborah CNP                              2021  
   
                                                    Z68.42 - Body mass index [BM  
I]   
45.0-49.9, adult                        Medical New Patient with Doreen   
Deborah CNP                              2021  
  
[facility]""  
Work Phone: [phone]- Evaluation note  
  
Includes: Assessments for all patient encounters  
  
  
  
                                Findings        Encounter       Date  
   
                                                    Mood disorders, NOS per tabatha  
ent reported   
history                                 BH Established Patient with   
Leonie Short LISWS                     2021  
   
                                        Post-traumatic stress disorder BH Establ  
ished Patient with   
Leonie Short LISWS                     2021  
   
                                        Morbid obesity      Medical Established   
Patient with   
Doreen Deborah CNP                        2021  
   
                                        Otitis externa      Medical Established   
Patient with   
Doreen Deborah CNP                        2021  
   
                                                    Z68.42 - Body mass index [BM  
I]   
45.0-49.9, adult                        Medical Established Patient with   
Doreen Deborah CNP                        2021  
   
                                        Post-traumatic stress disorder BH Establ  
ished Patient with   
Leonie Short LISWS                     06/10/2021  
   
                                        Morbid obesity      Medical Established   
Patient with   
Doreen Deborah CNP                        06/10/2021  
   
                                                    Z68.42 - Body mass index [BM  
I]   
45.0-49.9, adult                        Medical Established Patient with   
Doreen Deborah CNP                        06/10/2021  
   
                                        Post-traumatic stress disorder BH Establ  
ished Patient with   
Leonie Short LISWS                     2021  
   
                                                    Assessment of visit for: bhargavi myles for   
human immunodeficiency virus            Medical New Patient with Doreen   
Deborah CNP                              2021  
   
                                                    Diabetes Risk Test Score was  
 one score   
2021                               Medical New Patient with Doreen   
Deborah CNP                              2021  
   
                                        Hypertension        Medical New Patient   
with Doreen   
Deborah CNP                              2021  
   
                                        Morbid obesity      Medical New Patient   
with Doreen   
Deborah CNP                              2021  
   
                                        Post-traumatic stress disorder Medical N  
ew Patient with Doreen   
Deborah CNP                              2021  
   
                                                    Z68.42 - Body mass index [BM  
I]   
45.0-49.9, adult                        Medical New Patient with Doreen Cabrera CNP                              2021  
  
[facility]  
Work Phone: [phone]- History general Narrative - Reported  
  
Includes: Medical History in patient's chart  
  
  
  
                                        Description         Last Updated  
   
                                        Exposure to COVID-19 2021  
   
                                        Previous hospitalizations 2021  
   
                                        History of gynecologic disorder 20  
21  
   
                                        History of Polycystic Ovarian Syndrome (  
PCOS) 2021  
   
                                        History of anxiety disorder NOS 20  
21  
   
                                        History of migraine headache 2021  
   
                                        History of psychiatric disorders biopola  
r disorder 2021  
  
[facility]  
Work Phone: [phone]- History general Narrative - Reported  
  
Includes: Medical History in patient's chart  
  
  
  
                                        Description         Last Updated  
   
                                        Chronic illness     2021  
   
                                        Exposure to COVID-19 2021  
   
                                        Previous hospitalizations 2021  
   
                                        History of gynecologic disorder 20  
21  
   
                                        History of Polycystic Ovarian Syndrome (  
PCOS) 2021  
   
                                        History of anxiety disorder NOS 20  
21  
   
                                        History of migraine headache 2021  
   
                                        History of psychiatric disorders biopola  
r disorder 2021  
  
[facility]  
Work Phone: [phone]- Evaluation note  
  
Includes: Assessments for all patient encounters  
  
  
  
                                Findings        Encounter       Date  
   
                                        Morbid obesity      Medical Established   
Patient with   
Doreen Cabrera CNP                        06/10/2021  
   
                                                    Z68.42 - Body mass index [BM  
I]   
45.0-49.9, adult                        Medical Established Patient with   
Doreen Cabrera CNP                        06/10/2021  
   
                                        Post-traumatic stress disorder  Establ  
ished Patient with   
Leonie Short LISWS                     2021  
   
                                                    Assessment of visit for: bhargavi myles for   
human immunodeficiency virus            Medical New Patient with Doreen Cabrera CNP                              2021  
   
                                                    Diabetes Risk Test Score was  
 one score   
2021                               Medical New Patient with Doreen Cabrera CNP                              2021  
   
                                        Hypertension        Medical New Patient   
with Doreen Cabrera CNP                              2021  
   
                                        Morbid obesity      Medical New Patient   
with Doreen Cabrera CNP                              2021  
   
                                        Post-traumatic stress disorder Medical N  
ew Patient with Doreen Cabrera CNP                              2021  
   
                                                    Z68.42 - Body mass index [BM  
I]   
45.0-49.9, adult                        Medical New Patient with Doreen Cabrera CNP                              2021  
  
[facility]  
Work Phone: [phone]- Evaluation note  
  
Includes: Assessments for all patient encounters  
  
  
  
                                Findings        Encounter       Date  
   
                                        Post-traumatic stress disorder BH Establ  
ished Patient with   
Leonie Short LISWS                     2021  
   
                                                    Assessment of visit for: bhargavi myles for   
human immunodeficiency virus            Medical New Patient with Doreen Cabrera CNP                              2021  
   
                                                    Diabetes Risk Test Score was  
 one score   
2021                               Medical New Patient with Doreen Cabrera CNP                              2021  
   
                                        Hypertension        Medical New Patient   
with Doreen Cabrera CNP                              2021  
   
                                        Morbid obesity      Medical New Patient   
with Doreen Cabrera CNP                              2021  
   
                                        Post-traumatic stress disorder Medical N  
ew Patient with Doreen Cabrera CNP                              2021  
   
                                                    Z68.42 - Body mass index [BM  
I]   
45.0-49.9, adult                        Medical New Patient with Doreen Cabrera CNP                              2021  
  
[facility]  
Work Phone: [phone]- History general Narrative - Reported  
  
Includes: Medical History in patient's chart  
  
  
  
                                        Description         Last Updated  
   
                                        History of gynecologic disorder 20  
21  
   
                                        History of Polycystic Ovarian Syndrome (  
PCOS) 2021  
   
                                        History of anxiety disorder NOS 20  
21  
   
                                        History of migraine headache 2021  
   
                                        History of psychiatric disorders biopola  
r disorder 2021  
  
[facility]  
Work Phone: [phone]- History general Narrative - Reported  
  
Includes: Medical History in patient's chart  
  
  
  
                                        Description         Last Updated  
   
                                        Exposure to COVID-19 2021  
   
                                        Previous hospitalizations 2021  
   
                                        History of gynecologic disorder 20  
21  
   
                                        History of Polycystic Ovarian Syndrome (  
PCOS) 2021  
   
                                        History of anxiety disorder NOS 20  
21  
   
                                        History of migraine headache 2021  
   
                                        History of psychiatric disorders biopola  
r disorder 2021  
  
[facility]  
Work Phone: [phone] note*   
  
                                                    Diagnosis  
   
                                                      
  
  
Depression with suicidal ideation- Primary  
  
documented in this encounter  
[facility] Phone: [phone] note  
  
Includes: Assessments for all patient encounters  
  
  
  
                                Findings        Encounter       Date  
   
                                        Morbid obesity      Medical Established   
Patient with   
Doreen Deborah CNP                        2021  
   
                                        Otitis externa      Medical Established   
Patient with   
Doreen Deborah CNP                        2021  
   
                                                    Z68.42 - Body mass index [BM  
I]   
45.0-49.9, adult                        Medical Established Patient with   
Doreen Deborah CNP                        2021  
   
                                        Post-traumatic stress disorder BH Establ  
ished Patient with   
Leonie Short LISWS                     06/10/2021  
   
                                        Morbid obesity      Medical Established   
Patient with   
Doreen Deborah CNP                        06/10/2021  
   
                                                    Z68.42 - Body mass index [BM  
I]   
45.0-49.9, adult                        Medical Established Patient with   
Doreen Deborah CNP                        06/10/2021  
   
                                        Post-traumatic stress disorder BH Establ  
ished Patient with   
Leonie Short LISWS                     2021  
   
                                                    Assessment of visit for: bhargavi myles for   
human immunodeficiency virus            Medical New Patient with Doreen   
Deborah CNP                              2021  
   
                                                    Diabetes Risk Test Score was  
 one score   
2021                               Medical New Patient with Doreen   
Deborah CNP                              2021  
   
                                        Hypertension        Medical New Patient   
with Doreen   
Deborah CNP                              2021  
   
                                        Morbid obesity      Medical New Patient   
with Doreen   
Deborah CNP                              2021  
   
                                        Post-traumatic stress disorder Medical N  
ew Patient with Doreen   
Deborah CNP                              2021  
   
                                                    Z68.42 - Body mass index [BM  
I]   
45.0-49.9, adult                        Medical New Patient with Doreen   
Deborah CNP                              2021  
  
[facility] \[facility]""  
Work Phone: [phone] note*   
  
                                                    Diagnosis  
   
                                                      
  
  
Anxiety state- Primary  
  
  
Anxiety state, unspecified  
  
documented in this encounter  
[facility] Phone: [phone] note*   
  
                                                    Diagnosis  
   
                                                      
  
  
Bipolar 1 disorder (HCC)- Primary  
  
  
Bipolar I disorder, most recent episode (or current) unspecified  
   
                                                      
  
  
Homicidal ideation  
  
documented in this encounter  
[facility] Phone: [phone] note*   
  
                                                    Diagnosis  
   
                                                      
  
  
Tachycardia  
  
  
Tachycardia, unspecified  
  
documented in this encounter  
[facility] Phone: [phone] note  
  
Includes: Assessments for all patient encounters  
  
  
  
                                Findings        Encounter       Date  
   
                                                    [D50.9 - Iron deficiency ane  
jennifer,   
unspecified] iron deficiency anemia Chart Update with Doreen Cabrera CNP   
10/07/2024  
  
  
  
                                                    Last Documented On 10/07/202  
4 12:07PM ; [facility]  
  
  
  
                                        Attention-deficit hyperactivity disorder  
  Established Patient with Radha Young LSW                               2024  
  
  
  
                                                    Last Documented On   
4 4:15PM ; [facility]  
  
  
  
                                                    Bipolar I disorder, most rec  
ent   
episode, depressed - mild               BH Established Patient with Radha   
Young LSW                               2024  
  
  
  
                                                    Last Documented On   
4 4:15PM ; [facility]  
  
  
  
                                Nicotine dependence  Established Patient with   
Radha Young W 2024  
  
  
  
                                                    Last Documented On   
4 4:15PM ; [facility]  
  
  
  
                                        Post-traumatic stress disorder  Establ  
ished Patient with Radha Young   
W                                     2024  
  
  
  
                                                    Last Documented On   
4 4:15PM ; [facility]  
  
  
  
                                                    [Z68.43 - Body mass index [B  
MI]   
50.0-59.9, adult] assessment of body   
mass index                              Medical Established Patient with   
Doreen Cabrera CNP                        2024  
  
  
  
                                                    Last Documented On 10/04/202  
4 1:56PM ; [facility]  
  
  
  
                                                    Bipolar I disorder, most rec  
ent   
episode, depressed - mild               Medical Established Patient with Doreenpaola Cabrera CNP                              2024  
  
  
  
                                                    Last Documented On 10/04/202  
4 1:56PM ; [facility]  
  
  
  
                                Caries          Medical Established Patient with  
 Doreen Cabrera CNP 2024  
  
  
  
                                                    Last Documented On 10/04/202  
4 1:56PM ; [facility]  
  
  
  
                                        Encounter for Immunization Medical Estab  
lished Patient with Doreen Cabrera   
CNP                                     2024  
  
  
  
                                                    Last Documented On 10/04/202  
4 1:56PM ; [facility]  
  
  
  
                                                    Type 2 diabetes mellitus wit  
hout   
complication                            Medical Established Patient with   
Doreenpaola Mccormacken CNP                        2024  
  
  
  
                                                    Last Documented On 10/04/202  
4 1:56PM ; [facility]  
  
  
  
                                        Attention-deficit hyperactivity disorder  
  Established Patient with   
Leonie Short LISWS                     2024  
  
  
  
                                                    Last Documented On   
4 3:28PM ; [facility]  
  
  
  
                                                    Bipolar I disorder, most rec  
ent   
episode, depressed - mild                Established Patient with Leonie   
Short LISWS                             2024  
  
  
  
                                                    Last Documented On   
4 3:28PM ; [facility]  
  
  
  
                                        Post-traumatic stress disorder BH Establ  
ished Patient with Leonie Short   
LISWS                                   2024  
  
  
  
                                                    Last Documented On   
4 3:28PM ; [facility]  
  
  
  
                                                    [E11.9 - Type 2 diabetes lobito  
litus   
without complications] type 2 diabetes   
mellitus                                Medical Established Patient with   
Doreen Cabrera CNP                        2024  
  
  
  
                                                    Last Documented On   
4 7:36PM ; [facility]  
  
  
  
                                                    [F41.1 - Generalized anxiety  
   
disorder] generalized anxiety   
disorder                                Medical Established Patient with   
Doreen Cabrera CNP                        2024  
  
  
  
                                                    Last Documented On   
4 7:36PM ; [facility]  
  
  
  
                                                    [I10 - Essential (primary)   
hypertension] essential hypertension    Medical Established Patient with   
Doreen Cabrera CNP                        2024  
  
  
  
                                                    Last Documented On   
4 7:36PM ; [facility]  
  
  
  
                                                    [N94.6 - Dysmenorrhea, unspe  
cified]   
dysmenorrhea                            Medical Established Patient with   
Doreen Cabrera CNP                        2024  
  
  
  
                                                    Last Documented On   
4 7:36PM ; [facility]  
  
  
  
                                                    [Z68.43 - Body mass index [B  
MI]   
50.0-59.9, adult] assessment of body   
mass index                              Medical Established Patient with   
Doreen Cabrera CNP                        2024  
  
  
  
                                                    Last Documented On   
4 7:36PM ; [facility]  
  
  
  
                                        Encounter for Immunization Medical Estab  
lished Patient with Doreen Cabrera   
CNP                                     2024  
  
  
  
                                                    Last Documented On   
4 7:36PM ; [facility]  
  
  
  
                                        Venipuncture was performed Medical Estab  
lished Patient with Doreen Cabrera   
CNP                                     2024  
  
  
  
                                                    Last Documented On   
4 7:36PM ; [facility]  
  
  
  
                                        Attention-deficit hyperactivity disorder  
  Established Patient with   
Leonie Short LISWS                     2024  
  
  
  
                                                    Last Documented On   
4 10:10AM ; [facility]  
  
  
  
                                                    Bipolar I disorder, most rec  
ent   
episode, depressed - mild                Established Patient with Leonie   
Short L[facility]WS                             2024  
  
  
  
                                                    Last Documented On   
4 10:10AM ; [facility]  
  
  
  
                                        Post-traumatic stress disorder  Establ  
ished Patient with Leonie Short   
LISWS                                   2024  
  
  
  
                                                    Last Documented On   
4 10:10AM ; [facility]  
  
  
  
                                        [D64.9 - Anemia, unspecified] anemia Med  
ical Established Patient with   
Doreenpaola Cabrera CNP                        2024  
  
  
  
                                                    Last Documented On   
4 6:51PM ; [facility]  
  
  
  
                                                    [Z68.43 - Body mass index [B  
MI]   
50.0-59.9, adult] assessment of body   
mass index                              Medical Established Patient with   
Doreen Cabrera CNP                        2024  
  
  
  
                                                    Last Documented On   
4 6:51PM ; [facility]  
  
  
  
                                                    Bipolar affective disorder,   
current   
episode depressed, mild                  Established Patient with Leonie   
Short LISWS                             2024  
  
  
  
                                                    Last Documented On   
4 5:16PM ; [facility]  
  
  
  
                                        Post-traumatic stress disorder  Establ  
ished Patient with Leonie Short   
LISWS                                   2024  
  
  
  
                                                    Last Documented On   
4 5:16PM ; [facility]  
  
  
  
                                                    Undifferentiated attention d  
eficit   
disorder                                 Established Patient with   
Leonie Short LISWS                     2024  
  
  
  
                                                    Last Documented On   
4 5:16PM ; [facility]  
  
  
  
                                        Visit for: screening for disorder  Est  
ablished Patient with Leonie   
Short LISWS                             2024  
  
  
  
                                                    Last Documented On   
4 5:16PM ; [facility]  
  
  
  
                                                    [Z68.43 - Body mass index [B  
MI]   
50.0-59.9, adult] assessment of body   
mass index                              Medical Established Patient with   
Doreen Cabrera CNP                        2024  
  
  
  
                                                    Last Documented On   
4 7:50PM ; [facility]  
  
  
  
                                        Attention-deficit hyperactivity disorder  
 Medical Established Patient with   
Doreenpaola Mccormacken CNP                        2024  
  
  
  
                                                    Last Documented On   
4 7:50PM ; [facility]  
  
  
  
                                                    Diabetes Risk Test Score was  
 three   
score 3/27/2024                         Medical Established Patient with Doreenpaola Cabrera CNP                              2024  
  
  
  
                                                    Last Documented On   
4 7:50PM ; [facility]  
  
  
  
                                        Visit for: screening for STD Medical Est  
ablished Patient with Doreenpaola Cabrera   
CNP                                     2024  
  
  
  
                                                    Last Documented On   
4 7:50PM ; [facility]  
  
  
  
                                                    [Z68.32 - Body mass index [B  
MI]   
32.0-32.9, adult] assessment of body   
mass index                              Medical Established Patient with   
Doreen Cabrera CNP                        2023  
  
  
  
                                                    Last Documented On   
3 6:01PM ; [facility]  
  
  
  
                                        Borderline personality disorder Medical   
Established Patient with Doreenpaola Cabrera CNP                              2023  
  
  
  
                                                    Last Documented On   
3 6:01PM ; [facility]  
  
  
  
                                        Screening for diabetes mellitus Medical   
Established Patient with Doreenpaola Mccormacken CNP                              2023  
  
  
  
                                                    Last Documented On   
3 6:01PM ; [facility]  
  
  
  
                                        Assessment of body mass index Medical Es  
tablished Patient with Doreen Cabrera CNP                              10/21/2022  
  
  
  
                                                    Last Documented On 10/21/202  
2 2:45PM ; [facility]  
  
  
  
                                                    Bipolar affective disorder,   
current   
episode manic                            [facility] with Haylie   
Aguilar [facility]                        2022  
  
  
  
                                                    Last Documented On   
2 3:22PM ; [facility]  
  
  
  
                                                    Bipolar affective disorder,   
current   
episode manic                            Established Patient with Haylie   
Aguilar [facility]S                        2022  
  
  
  
                                                    Last Documented On   
2 2:28PM ; [facility]  
  
  
  
                                                    Bipolar affective disorder,   
current   
episode depressed, severe with   
psychosis                                Tele[facility] Health with Haylie   
Aguilar [facility]                        2022  
  
  
  
                                                    Last Documented On   
2 3:29PM ; [facility]  
  
  
  
                                                    Assessment of body mass inde  
x [Body   
mass index [BMI] 50.0-59.9, adult]      Open Access - Established with Julieth   
Aliya CNP                               2022  
  
  
  
                                                    Last Documented On   
2 9:36AM ; [facility]  
  
  
  
                                                    Bipolar I disorder, most rec  
ent   
episode, manic                          Open Access - Established with Julieth   
Aliya CNP                               2022  
  
  
  
                                                    Last Documented On   
2 9:36AM ; [facility]  
  
  
  
                                        Post-traumatic stress disorder  Establ  
ished Patient with Haylie Aguilar   
[facility]                                  2022  
  
  
  
                                                    Last Documented On   
2 3:20PM ; [facility]  
  
  
  
                                No cough        Medical Established Patient with  
 Doreenpaola Cabrera CNP 2022  
  
  
  
                                                    Last Documented On   
2 4:04PM ; [facility]  
  
  
  
                                                    Z68.43 - Body mass index [BM  
I]   
50.0-59.9, adult                        Medical Established Patient with   
Doreenpaola Cabrera [facility]                        2022  
  
  
  
                                                    Last Documented On   
2 4:04PM ; [facility]  
  
  
  
                                                    Borderline personality disor  
danny Pt   
reported hx of sx/dx                     Established Patient with Haylie Aguilar [facility]S                        2022  
  
  
  
                                                    Last Documented On   
2 4:08PM ; [facility]  
  
  
  
                                                    Assessment of body mass inde  
x [Body   
mass index [BMI] 50.0-59.9, adult]      Open Access - Established with Julieth Pisano CNP                               2022  
  
  
  
                                                    Last Documented On   
2 7:41PM ; [facility]  
  
  
  
                                                    Diabetes Risk Test Score was  
 three   
score 2022                         Open Access - Established with Julieth   
Aliya CNP                               2022  
  
  
  
                                                    Last Documented On   
2 7:41PM ; [facility]  
  
  
  
                                                    Bipolar I disorder, most rec  
ent   
episode, manic                           [facility]                          [facility]  
  
  
  
                                                    Last Documented On   
1 1:35AM ; [facility]  
  
  
  
                                        Borderline personality disorder  Estab  
lished Patient with Avis   
Felder [facility]                          2021  
  
  
  
                                                    Last Documented On   
1 1:35AM ; [facility]  
  
  
  
                                        Post-traumatic stress disorder  Establ  
ished Patient [facility]   
[facility]                          20[facility]  
  
  
  
                                                    Last Documented On   
1 1:35AM ; [facility]  
  
  
  
                                                    Assessment of visit for: bhargavi myles for   
human immunodeficiency virus            Medical Established Patient with   
Doreenpaola Mccormacken [facility]                        2021  
  
  
  
                                                    Last Documented On   
1 2:56PM ; [facility]  
  
  
  
                                Nicotine dependence Medical Established Patient   
with Doreen Deborah CNP 2021  
  
  
  
                                                    Last Documented On   
1 2:56PM ; [facility]  
  
  
  
                                Tachycardia     Medical Established Patient with  
 Doreen Deborah CNP 2021  
  
  
  
                                                    Last Documented On   
1 2:56PM ; [facility]  
  
  
  
                                                    Z68.43 - Body mass index [BM  
I]   
50.0-59.9, adult                        Medical Established Patient with   
Doreen Cabrera CNP                        2021  
  
  
  
                                                    Last Documented On   
1 2:56PM ; [facility]  
  
  
  
                                                    Bipolar I disorder, most rec  
ent   
episode, manic                           Established Patient with Leonie   
Short [facility]                             2021  
  
  
  
                                                    Last Documented On   
1 10:17AM ; [facility]  
  
  
  
                                                    Borderline personality disor  
danny per   
patient reported history                 Established Patient with Leonie   
Short [facility]                             2021  
  
  
  
                                                    Last Documented On   
1 10:17AM ; [facility]  
  
  
  
                                Nicotine dependence  Established Patient with   
Leonie Short [facility]WS 2021  
  
  
  
                                                    Last Documented On   
1 10:17AM ; [facility]  
  
  
  
                                        Post-traumatic stress disorder  Establ  
ished Patient with Leonie Short   
LISWS                                   2021  
  
  
  
                                                    Last Documented On   
1 10:17AM ; [facility]  
  
  
  
                                Body mass index Medical Established Patient with  
 Doreen Cabrera CNP 2021  
  
  
  
                                                    Last Documented On   
1 5:23PM ; [facility]  
  
  
  
                                Morbid obesity  Medical Established Patient with  
 Doreen Deborah CNP 2021  
  
  
  
                                                    Last Documented On   
1 5:23PM ; [facility]  
  
  
  
                                        Nicotine dependence uncomplicated Medica  
l Established Patient with Doreenpaola Cabrera CNP                              2021  
  
  
  
                                                    Last Documented On   
1 5:23PM ; [facility]  
  
  
  
                                                    Z68.42 - Body mass index [BM  
I]   
45.0-49.9, adult                        Medical Established Patient with   
Doreen Cabrera CNP                        2021  
  
  
  
                                                    Last Documented On   
1 5:23PM ; [facility]  
  
  
  
                                Episodic mood disorders On Call with Pamela edwards CNP 2021  
  
  
  
                                                    Last Documented On   
1 7:49AM ; [facility]  
  
  
  
                                                    Mood disorders, NOS per tabatha  
ent   
reported history                         Established Patient with Leonie   
Short [facility]                             2021  
  
  
  
                                                    Last Documented On   
1 7:15PM ; [facility]  
  
  
  
                                        Post-traumatic stress disorder  Establ  
ished Patient with Leonie Short   
LISWS                                   2021  
  
  
  
                                                    Last Documented On   
1 7:15PM ; [facility]  
  
  
  
                                Morbid obesity  Medical Established Patient with  
 Doreen Deborah CNP 2021  
  
  
  
                                                    Last Documented On  12:31PM ; [facility]  
  
  
  
                                Otitis externa  Medical Established Patient with  
 Doreen Deborah CNP 2021  
  
  
  
                                                    Last Documented On  12:31PM ; [facility]  
  
  
  
                                                    Z68.42 - Body mass index [BM  
I]   
45.0-49.9, adult                        Medical Established Patient with   
Doreen Deborah CNP                        2021  
  
  
  
                                                    Last Documented On   
1 12:31PM ; [facility]  
  
  
  
                                        Post-traumatic stress disorder  Establ  
ished Patient with Leonie Short   
LISWS                                   06/10/2021  
  
  
  
                                                    Last Documented On 06/10/202  
1 10:05PM ; [facility]  
  
  
  
                                Morbid obesity  Medical Established Patient with  
 Doreen Deborah CNP 06/10/2021  
  
  
  
                                                    Last Documented On 06/10/202  
1 4:45PM ; [facility]  
  
  
  
                                                    Z68.42 - Body mass index [BM  
I]   
45.0-49.9, adult                        Medical Established Patient with   
Doreen Deborah CNP                        06/10/2021  
  
  
  
                                                    Last Documented On 06/10/202  
1 4:45PM ; [facility]  
  
  
  
                                        Post-traumatic stress disorder  Establ  
ished Patient with Leonie Short   
LISWS                                   2021  
  
  
  
                                                    Last Documented On   
1 11:59AM ; [facility]  
  
  
  
                                                    Assessment of visit for: scr  
eening for   
human immunodeficiency virus            Medical New Patient with Doreen   
Deborah CNP                              2021  
  
  
  
                                                    Last Documented On   
1 4:06PM ; [facility]  
  
  
  
                                                    Diabetes Risk Test Score was  
 one score   
2021                               Medical New Patient with Doreen   
Deborah CNP                              2021  
  
  
  
                                                    Last Documented On   
1 4:06PM ; [facility]  
  
  
  
                                Hypertension    Medical New Patient with Doreen C  
cate CNP 2021  
  
  
  
                                                    Last Documented On   
1 4:06PM ; [facility]  
  
  
  
                                Morbid obesity  Medical New Patient with Doreen C  
cate CNP 2021  
  
  
  
                                                    Last Documented On   
1 4:06PM ; [facility]  
  
  
  
                                Post-traumatic stress disorder Medical New Patie  
nt with Doreen Deborah CNP   
2021  
  
  
  
                                                    Last Documented On   
1 4:06PM ; [facility]  
  
  
  
                                                    Z68.42 - Body mass index [BM  
I]   
45.0-49.9, adult                        Medical New Patient with Doreen Cabrera CNP                              2021  
  
  
  
                                                    Last Documented On   
1 4:06PM ; [facility]  
Work Phone: [phone] of Present illness Narrative  
  
History of Present Illness not supported for this document type  
  
No History of Present Illness RecordedHealth [facility]  
Work Phone: [phone] Discharge instructions* Instructions*   
  
Malika Shepherd MD - 2021  
  
  
  
Formatting of this note might be different from the original.  
Please take all medications as prescribed.  
  
Please follow up with your primary care physician by calling today, or as soon 
as possible, for thefirst available appointment. If you do not have a primary 
care physician, please contact a physician or clinic listed below today to 
establish care.  
  
Please return to the emergency department IMMEDIATELY if you develop 
uncontrolled fevers, uncontrolled vomiting, change in symptoms, worsening of 
symptoms, or ANY other concerns.  
  
  
  
  
* Attachments  
  
The following attachments cannot be sent through [facility] Everywhere.  
  
* Anxiety Disorder (English)  
  
[facility]  
Work Phone: [phone]  
  
Instructions not supported for this document type  
  
[facility]  
Work Phone: [phone]  
  
Includes: Instructions for all patient encounters  
  
  
  
                                                    Education and Decision Aids   
were provided during visit for:  
   
                                                    Counseling/education [Use fo  
r free text]  
   
                                                    Last Documented On   
4 7:40PM ; [facility]  
   
                                                    Discussed nutritional needs   
teach healthy choices including fruits and   
vegetables  
   
                                                    Last Documented On   
4 7:14PM ; [facility]  
   
                                                    Patient education about a pr  
oper diet  
   
                                                    Last Documented On   
4 7:14PM ; [facility]  
   
                                                    Discussed concerns about exe  
rcise : promote physical activity  
   
                                                    Last Documented On   
4 7:14PM ; [facility]  
   
                                                    Not requesting contraception  
   
                                                    Last Documented On   
4 7:14PM ; [facility]  
   
                                                    Discussed nutritional needs   
teach healthy choices including fruits and   
vegetables  
   
                                                    Last Documented On   
3 5:15PM ; [facility]  
   
                                                    Patient education about a pr  
oper diet  
   
                                                    Last Documented On   
3 5:15PM ; [facility]  
   
                                                    Discussed concerns about exe  
rcise : promote physical activity  
   
                                                    Last Documented On   
3 5:15PM ; [facility]  
   
                                                    Discussed nutritional needs   
teach healthy choices including fruits and   
vegetables  
   
                                                    Last Documented On 10/21/202  
2 1:42PM ; [facility]  
   
                                                    Patient education about a pr  
oper diet  
   
                                                    Last Documented On 10/21/202  
2 1:42PM ; [facility]  
   
                                                    Discussed concerns about exe  
rcise : promote physical activity  
   
                                                    Last Documented On 10/21/202  
2 1:42PM [facility]  
 and supportive [facility]; normalized emotions and   
feelings, also provided pt time to process any current stressors. ~Promoted and   
encouraged follow-through with scheduling psychiatric services  
   
                                                    Last Documented On   
2 3:21PM ; [facility] provided supportive, empa  
thic listening and reflective feedback. ~Explored,   
encouraged, and supported the pt to discuss current sx/mood, assess risk for   
harm/need, coping mechanisms, support network and safety planning. ~Supported 
pt's   
plan to f/up with Dr. Alonso, as planned at [place], [place]. ~Encouraged 
pt to   
use safety plan, if needed to ensure she remains safe  
   
                                                    Last Documented On   
2 2:27PM ; [facility]  
   
                                                    Discussed nutritional needs   
teach healthy choices including fruits and   
vegetables  
   
                                                    Last Documented On   
2 3:20PM ; [facility]  
   
                                                    Patient education about a pr  
oper diet  
   
                                                    Last Documented On   
2 3:20PM ; [facility]  
   
                                                    Discussed concerns about exe  
rcise : promote physical activity ~ ~Will restart   
trazodone and prazosin ~ ~Patient is planning to have brother stay with her for 
a few   
days for emotional support ~ ~Follow up with PCP at next scheduled visit ~ ~Call
  
psychiatrist office to schedule appt ~ ~Call counselor  
   
                                                    Last Documented On   
2 9:35AM [facility] [facility]  
   
                                                    Provided supportive listenin  
g and empathic feedback; encouraged, explored, and   
supported the pt as she processed current symptoms, Issues, and concerns. 
~Discussed   
past tx and explored current needs/options. Acknowledged and validated pt's 
thoughts   
and emotions. ~Explored coping mechanisms and support network; utilized 
opportunity   
for safety planning; promoted seeking positive support and seeking help, as 
needed  
   
                                                    Last Documented On   
2 3:28PM ; formerly [facility] provided active listenin  
g, support and helped pt process though current   
symptoms   
and stressor(s). Discussed and explored past effectiveness of medication; 
identified   
objectives and future goals; promoted use of healthy coping mechanisms, and 
self-care   
practices  
   
                                                    Last Documented On   
2 3:19PM ; [facility]  
   
                                                    Reviewed side effects and Ri  
sks/Benefits analysis  
   
                                                    Last Documented On   
2 3:19PM ; [facility]  
   
                                                    Discussed nutritional needs   
teach healthy choices including fruits and   
vegetables  
   
                                                    Last Documented On   
2 3:15PM ; [facility]  
   
                                                    Patient education about a pr  
oper diet  
   
                                                    Last Documented On   
2 3:15PM ; [facility]  
   
                                                    Discussed concerns about exe  
rcise : promote physical activity  
   
                                                    Last Documented On   
2 3:15PM ; formerly [facility] introduced pt to [facility] in  
tegrated model of care ~[facility] offered active listening  
and   
supportive feedback; normalized emotions and feelings, also provided pt time to   
process any current stressors ~[facility] discussed potential benefits of counseling 
and   
supported re-engaging, as needed. ~[facility] encouraged pt to continue to make time to
  
implement self-care regimen and use coping methods, as needed  
   
                                                    Last Documented On   
2 4:07PM ; [facility]  
   
                                                    Discussed nutritional needs   
teach healthy choices including fruits and   
vegetables  
   
                                                    Last Documented On   
2 3:15PM ; [facility]  
   
                                                    Patient education about a pr  
oper diet  
   
                                                    Last Documented On   
2 3:15PM ; [facility]  
   
                                                    Inquiry and counseling about  
 medication administration and compliance  
   
                                                    Last Documented On   
2 7:37PM ; [facility]  
   
                                                    Discussed concerns about exe  
rcise : promote physical activity  
   
                                                    Last Documented On   
2 3:15PM ; [facility]  
   
                                                    Patient goals discussed  
   
                                                    Last Documented On   
2 7:37PM ; [facility]  
   
                                                    Ansewred pt's questions re B  
orderlline Personality D/O and Bipolar D/O raised by  
  
psychiatrist at [place]. ~Validated and normalized patient?s feelings while   
assisting to process recent events  
   
                                                    Last Documented On   
1 1:33AM ; [facility]  
   
                                                    Discussed nutritional needs   
teach healthy choices including fruits and   
vegetables  
   
                                                    Last Documented On   
1 2:04PM ; [facility]  
   
                                                    Patient education about a pr  
oper diet  
   
                                                    Last Documented On   
1 2:04PM ; [facility]  
   
                                                    Discussed concerns about exe  
rcise : promote physical activity  
   
                                                    Last Documented On   
1 2:04PM ; formerly [facility] provided active listenin  
g, support and helped patient process through   
current   
symptoms and stressors with ongoing mental health concerns and medication 
changes.   
~[facility] discussed coping skills and supports that patient is implementing.  
discussed   
implementing coping skills as discussed with counseling and attending weekly   
appointments as scheduled with counselor. Patient was encouraged to continue 
writing   
down concerns with medications and discuss with providers. ~P reminded patient
of   
crisis resources should they be needed. Patient reports having crisis resources 
and   
could return to ER  
   
                                                    Last Documented On   
1 10:17AM ; [facility]  
   
                                                    Patient education about a pr  
oper diet  
   
                                                    Last Documented On   
1 5:17PM ; [facility]  
   
                                                    Patient education about meal  
 planning  
   
                                                    Last Documented On   
1 5:17PM ; [facility]  
   
                                                    Education about changing eat  
ing habits  
   
                                                    Last Documented On   
1 5:17PM ; [facility]  
   
                                                    Patient education about high  
 fiber diet  
   
                                                    Last Documented On   
1 5:17PM ; [facility]  
   
                                                    Patient education about low   
fat diet  
   
                                                    Last Documented On   
1 5:17PM ; [facility]  
   
                                                    Patient education about low   
cholesterol diet  
   
                                                    Last Documented On   
1 5:17PM ; [facility]  
   
                                                    Patient education about low   
carbohydrate diet  
   
                                                    Last Documented On   
1 5:17PM ; [facility]  
   
                                                    Patient education about high  
 protein diet  
   
                                                    Last Documented On   
1 5:17PM ; formerly [facility] offered active and suppo  
rtive listening, normalized emotions and feelings,   
and   
processed current stressors. ~[facility] discussed resources for finding a counselor 
and   
provided list of local resources. ~P discussed patients coping skills and 
supports   
and encouraged patient to continue to implement. ~[facility] reminded patient of crisis
  
resources should they be needed  
   
                                                    Last Documented On   
1 7:15PM ; [facility]  
   
                                                    Discussed nutritional needs   
teach healthy choices including fruits and   
vegetables  
   
                                                    Last Documented On   
1 11:38AM ; [facility]  
   
                                                    Patient education about a pr  
oper diet  
   
                                                    Last Documented On   
1 11:38AM ; [facility]  
   
                                                    Patient education about a pr  
oper diet  
   
                                                    Last Documented On   
1 12:19PM ; [facility]  
   
                                                    Patient education about meal  
 planning  
   
                                                    Last Documented On   
1 12:19PM ; [facility]  
   
                                                    Education about changing eat  
ing habits  
   
                                                    Last Documented On   
1 12:19PM ; [facility]  
   
                                                    Patient education about high  
 fiber diet  
   
                                                    Last Documented On  12:19PM ; [facility]  
   
                                                    Patient education about low   
fat diet  
   
                                                    Last Documented On   
1 12:19PM ; [facility]  
   
                                                    Patient education about low   
cholesterol diet  
   
                                                    Last Documented On   
1 12:19PM ; [facility]  
   
                                                    Patient education about low   
carbohydrate diet  
   
                                                    Last Documented On   
1 12:19PM ; [facility]  
   
                                                    Patient education about high  
 protein diet  
   
                                                    Last Documented On   
1 12:19PM ; [facility]  
   
                                                    Discussed concerns about exe  
rcise : promote physical activity  
   
                                                    Last Documented On   
1 11:38AM ; formerly [facility] provided active listenin  
g, support and helped patient process through   
current   
symptoms and stressors related to family conflict. ~[facility] discussed coping skills 
and   
supports with patient that can be implemented and reminded patient of ways to 
access   
additional resources. ~[facility] discussed crisis resources and plan. Patient has 
crisis   
resources still available should they be needed  
   
                                                    Last Documented On 06/10/202  
1 7:04PM ; [facility]  
   
                                                    Discussed nutritional needs   
teach healthy choices including fruits and   
vegetables  
   
                                                    Last Documented On 06/10/202  
1 3:57PM ; [facility]  
   
                                                    Patient education about a pr  
oper diet  
   
                                                    Last Documented On 06/10/202  
1 3:57PM ; [facility]  
   
                                                    Discussed concerns about exe  
rcise : promote physical activity  
   
                                                    Last Documented On 06/10/202  
1 3:57PM [facility]. [facility] and PCP reassured   
patient of not sharing information with anyone unless she has signed a release 
for us   
to do so. ~[facility] provided active listening, support and helped patient process 
through   
current symptoms and stressors. ~[facility] discussed establishing counseling and   
psychiatry. [facility] discussed EMDR therapy and ways to find provider who does this 
type   
of therapy. ~[facility] discussed crisis resources should mood worsen, [facility] provided 
text   
hotline number for crisis. [facility] reviewed crisis plan with patient and patient is 
able   
to contact positive supports and family when feeling down  
   
                                                    Last Documented On   
1 11:46AM ; [facility]  
   
                                                    Discussed nutritional needs   
teach healthy choices including fruits and   
vegetables  
   
                                                    Last Documented On   
1 2:11PM ; [facility]  
   
                                                    Patient education about a pr  
oper diet  
   
                                                    Last Documented On   
1 2:11PM ; [facility]  
   
                                                    Discussed concerns about exe  
rcise : promote physical activity  
   
                                                    Last Documented On   
1 2:11PM ; [facility]  
Work Phone: [phone]  
  
Includes: Instructions for all patient encounters  
  
  
  
                                                    Education and Decision Aids   
were provided during visit for:  
   
                                                    [facility] offered active and suppo  
rtive listening and processed current stressors   
related   
to getting medications. ~[facility] discussed coping skills and supports to implement 
in   
daily routine. ~[facility] discussed progress patient has felt they have made recently 
and   
encouraged continued follow-up with providers to address health  
   
                                                    Last Documented On   
4 5:16PM ; [facility]  
   
                                                    Discussed nutritional needs   
teach healthy choices including fruits and   
vegetables  
   
                                                    Last Documented On   
4 7:14PM ; [facility]  
   
                                                    Patient education about a pr  
oper diet  
   
                                                    Last Documented On   
4 7:14PM ; [facility]  
   
                                                    Discussed concerns about exe  
rcise : promote physical activity  
   
                                                    Last Documented On   
4 7:14PM ; [facility]  
   
                                                    Not requesting contraception  
   
                                                    Last Documented On   
4 7:14PM ; [facility]  
   
                                                    Discussed nutritional needs   
teach healthy choices including fruits and   
vegetables  
   
                                                    Last Documented On   
3 5:15PM ; [facility]  
   
                                                    Patient education about a pr  
oper diet  
   
                                                    Last Documented On   
3 5:15PM ; [facility]  
   
                                                    Discussed concerns about exe  
rcise : promote physical activity  
   
                                                    Last Documented On   
3 5:15PM ; [facility]  
   
                                                    Discussed nutritional needs   
teach healthy choices including fruits and   
vegetables  
   
                                                    Last Documented On 10/21/202  
2 1:42PM ; [facility]  
   
                                                    Patient education about a pr  
oper diet  
   
                                                    Last Documented On 10/21/202  
2 1:42PM ; [facility]  
   
                                                    Discussed concerns about exe  
rcise : promote physical activity  
   
                                                    Last Documented On 10/21/202  
2 1:42PM ; [facility] offered active listening  
 and supportive feedback; normalized emotions and   
feelings, also provided pt time to process any current stressors. ~Promoted and   
encouraged follow-through with scheduling psychiatric services  
   
                                                    Last Documented On   
2 3:21PM ; [facility] provided supportive, empa  
thic listening and reflective feedback. ~Explored,   
encouraged, and supported the pt to discuss current sx/mood, assess risk for   
harm/need, coping mechanisms, support network and safety planning. ~Supported 
pt's   
plan to f/up with Dr. Alonso, as planned at [place], [place]. ~Encouraged 
pt to   
use safety plan, if needed to ensure she remains safe  
   
                                                    Last Documented On   
2 2:27PM ; [facility]  
   
                                                    Discussed nutritional needs   
teach healthy choices including fruits and   
vegetables  
   
                                                    Last Documented On   
2 3:20PM ; [facility]  
   
                                                    Patient education about a pr  
oper diet  
   
                                                    Last Documented On   
2 3:20PM ; [facility]  
   
                                                    Discussed concerns about exe  
rcise : promote physical activity ~ ~Will restart   
trazodone and prazosin ~ ~Patient is planning to have brother stay with her for 
a few   
days for emotional support ~ ~Follow up with PCP at next scheduled visit ~ ~Call
  
psychiatrist office to schedule appt ~ ~Call counselor  
   
                                                    Last Documented On   
2 9:35AM ; [facility]  
   
                                                    Provided supportive listenin  
g and empathic feedback; encouraged, explored, and   
supported the pt as she processed current symptoms, Issues, and concerns. 
~Discussed   
past tx and explored current needs/options. Acknowledged and validated pt's 
thoughts   
and emotions. ~Explored coping mechanisms and support network; utilized 
opportunity   
for safety planning; promoted seeking positive support and seeking help, as 
needed  
   
                                                    Last Documented On   
2 3:28PM ; [facility] provided active listenin  
g, support and helped pt process though current   
symptoms   
and stressor(s). Discussed and explored past effectiveness of medication; 
identified   
objectives and future goals; promoted use of healthy coping mechanisms, and 
self-care   
practices  
   
                                                    Last Documented On   
2 3:19PM ; [facility]  
   
                                                    Reviewed side effects and Ri  
sks/Benefits analysis  
   
                                                    Last Documented On   
2 3:19PM ; [facility]  
   
                                                    Discussed nutritional needs   
teach healthy choices including fruits and   
vegetables  
   
                                                    Last Documented On   
2 3:15PM ; [facility]  
   
                                                    Patient education about a pr  
oper diet  
   
                                                    Last Documented On   
2 3:15PM ; [facility]  
   
                                                    Discussed concerns about exe  
rcise : promote physical activity  
   
                                                    Last Documented On   
2 3:15PM ; [facility] introduced pt to [facility] in  
tegrated model of care ~[facility] offered active listening  
and   
supportive feedback; normalized emotions and feelings, also provided pt time to   
process any current stressors ~[facility] discussed potential benefits of counseling 
and   
supported re-engaging, as needed. ~[facility] encouraged pt to continue to make time to
  
implement self-care regimen and use coping methods, as needed  
   
                                                    Last Documented On   
2 4:07PM ; [facility]  
   
                                                    Discussed nutritional needs   
teach healthy choices including fruits and   
vegetables  
   
                                                    Last Documented On   
2 3:15PM ; [facility]  
   
                                                    Patient education about a pr  
oper diet  
   
                                                    Last Documented On   
2 3:15PM ; [facility]  
   
                                                    Inquiry and counseling about  
 medication administration and compliance  
   
                                                    Last Documented On   
2 7:37PM ; [facility]  
   
                                                    Discussed concerns about exe  
rcise : promote physical activity  
   
                                                    Last Documented On   
2 3:15PM ; [facility]  
   
                                                    Patient goals discussed  
   
                                                    Last Documented On   
2 7:37PM ; [facility]  
   
                                                    Ansewred pt's questions re B  
orderlline Personality D/O and Bipolar D/O raised by  
  
psychiatrist at [place]. ~Validated and normalized patient?s feelings while   
assisting to process recent events  
   
                                                    Last Documented On   
1 1:33AM ; [facility]  
   
                                                    Discussed nutritional needs   
teach healthy choices including fruits and   
vegetables  
   
                                                    Last Documented On   
1 2:04PM ; [facility]  
   
                                                    Patient education about a pr  
oper diet  
   
                                                    Last Documented On   
1 2:04PM ; [facility]  
   
                                                    Discussed concerns about exe  
rcise : promote physical activity  
   
                                                    Last Documented On   
1 2:04PM ; [facility] provided active listenin  
g, support and helped patient process through   
current   
symptoms and stressors with ongoing mental health concerns and medication 
changes.   
[facility] discussed coping skills and supports that patient is implementing.  
discussed   
implementing coping skills as discussed with counseling and attending weekly   
appointments as scheduled with counselor. Patient was encouraged to continue 
writing   
down concerns with medications and discuss with providers. [facility] reminded patient
of   
crisis resources should they be needed. Patient reports having crisis resources 
and   
could return to ER  
   
                                                    Last Documented On   
1 10:17AM ; [facility]  
   
                                                    Patient education about a pr  
oper diet  
   
                                                    Last Documented On   
1 5:17PM ; [facility]  
   
                                                    Patient education about meal  
 planning  
   
                                                    Last Documented On   
1 5:17PM ; [facility]  
   
                                                    Education about changing eat  
ing habits  
   
                                                    Last Documented On   
1 5:17PM ; [facility]  
   
                                                    Patient education about high  
 fiber diet  
   
                                                    Last Documented On   
1 5:17PM ; [facility]  
   
                                                    Patient education about low   
fat diet  
   
                                                    Last Documented On   
1 5:17PM ; [facility]  
   
                                                    Patient education about low   
cholesterol diet  
   
                                                    Last Documented On   
1 5:17PM ; [facility]  
   
                                                    Patient education about low   
carbohydrate diet  
   
                                                    Last Documented On   
1 5:17PM ; [facility]  
   
                                                    Patient education about high  
 protein diet  
   
                                                    Last Documented On   
1 5:17PM ; [facility] offered active and suppo  
rtive listening, normalized emotions and feelings,   
and   
processed current stressors. [facility] discussed resources for finding a counselor 
and   
provided list of local resources. [facility] discussed patients coping skills and 
supports   
and encouraged patient to continue to implement. [facility] reminded patient of crisis
  
resources should they be needed  
   
                                                    Last Documented On   
1 7:15PM ; [facility]  
   
                                                    Discussed nutritional needs   
teach healthy choices including fruits and   
vegetables  
   
                                                    Last Documented On   
1 11:38AM ; [facility]  
   
                                                    Patient education about a pr  
oper diet  
   
                                                    Last Documented On   
1 11:38AM ; [facility]  
   
                                                    Patient education about a pr  
oper diet  
   
                                                    Last Documented On   
1 12:19PM ; [facility]  
   
                                                    Patient education about meal  
 planning  
   
                                                    Last Documented On   
1 12:19PM ; [facility]  
   
                                                    Education about changing eat  
ing habits  
   
                                                    Last Documented On   
1 12:19PM ; [facility]  
   
                                                    Patient education about high  
 fiber diet  
   
                                                    Last Documented On   
1 12:19PM ; [facility]  
   
                                                    Patient education about low   
fat diet  
   
                                                    Last Documented On   
1 12:19PM ; [facility]  
   
                                                    Patient education about low   
cholesterol diet  
   
                                                    Last Documented On   
1 12:19PM ; [facility]  
   
                                                    Patient education about low   
carbohydrate diet  
   
                                                    Last Documented On   
1 12:19PM ; [facility]  
   
                                                    Patient education about high  
 protein diet  
   
                                                    Last Documented On   
1 12:19PM ; [facility]  
   
                                                    Discussed concerns about exe  
rcise : promote physical activity  
   
                                                    Last Documented On   
1 11:38AM ; [facility]  
g, support and helped patient process through   
current   
symptoms and stressors related to family conflict. ~P discussed coping skills 
and   
supports with patient that can be implemented and reminded patient of ways to 
access   
additional resources. ~P discussed crisis resources and plan. Patient has 
crisis   
resources still available should they be needed  
   
                                                    Last Documented On 06/10/202  
1 7:04PM ; [facility]  
   
                                                    Discussed nutritional needs   
teach healthy choices including fruits and   
vegetables  
   
                                                    Last Documented On 06/10/202  
1 3:57PM ; [facility]  
   
                                                    Patient education about a pr  
oper diet  
   
                                                    Last Documented On 06/10/202  
1 3:57PM ; [facility]  
   
                                                    Discussed concerns about exe  
rcise : promote physical activity  
   
                                                    Last Documented On 06/10/202  
1 3:57PM ; [facility]. [facility] and PCP reassured   
patient of not sharing information with anyone unless she has signed a release 
for us   
to do so. ~P provided active listening, support and helped patient process 
through   
current symptoms and stressors. ~P discussed establishing counseling and   
psychiatry. [facility] discussed EMDR therapy and ways to find provider who does this 
type   
of therapy. ~P discussed crisis resources should mood worsen, [facility] provided 
text   
hotline number for crisis. [facility] reviewed crisis plan with patient and patient is 
able   
to contact positive supports and family when feeling down  
   
                                                    Last Documented On   
1 11:46AM ; [facility]  
   
                                                    Discussed nutritional needs   
teach healthy choices including fruits and   
vegetables  
   
                                                    Last Documented On   
1 2:11PM ; [facility]  
   
                                                    Patient education about a pr  
oper diet  
   
                                                    Last Documented On   
1 2:11PM ; [facility]  
   
                                                    Discussed concerns about exe  
rcise : promote physical activity  
   
                                                    Last Documented On   
1 2:11PM ; [facility]  
Work Phone: [phone]  
  
Includes: Instructions for all patient encounters  
  
  
  
                                                    Education and Decision Aids   
were provided during visit for:  
   
                                                    [facility] offered active and suppo  
rtive listening and processed current stressors   
related   
to getting medications. ~[facility] discussed coping skills and supports to implement 
in   
daily routine. ~[facility] discussed progress patient has felt they have made recently 
and   
encouraged continued follow-up with providers to address health  
   
                                                    Last Documented On   
4 5:16PM ; [facility]  
   
                                                    Discussed nutritional needs   
teach healthy choices including fruits and   
vegetables  
   
                                                    Last Documented On   
4 7:14PM ; [facility]  
   
                                                    Patient education about a pr  
oper diet  
   
                                                    Last Documented On   
4 7:14PM ; [facility]  
   
                                                    Discussed concerns about exe  
rcise : promote physical activity  
   
                                                    Last Documented On   
4 7:14PM ; [facility]  
   
                                                    Not requesting contraception  
   
                                                    Last Documented On   
4 7:14PM ; [facility]  
   
                                                    Discussed nutritional needs   
teach healthy choices including fruits and   
vegetables  
   
                                                    Last Documented On   
3 5:15PM ; [facility]  
   
                                                    Patient education about a pr  
oper diet  
   
                                                    Last Documented On   
3 5:15PM ; [facility]  
   
                                                    Discussed concerns about exe  
rcise : promote physical activity  
   
                                                    Last Documented On   
3 5:15PM ; [facility]  
   
                                                    Discussed nutritional needs   
teach healthy choices including fruits and   
vegetables  
   
                                                    Last Documented On 10/21/202  
2 1:42PM ; [facility]  
   
                                                    Patient education about a pr  
oper diet  
   
                                                    Last Documented On 10/21/202  
2 1:42PM ; [facility]  
   
                                                    Discussed concerns about exe  
rcise : promote physical activity  
   
                                                    Last Documented On 10/21/202  
2 1:42PM ; [facility] offered active listening  
 and supportive feedback; normalized emotions and   
feelings, also provided pt time to process any current stressors. ~Promoted and   
encouraged follow-through with scheduling psychiatric services  
   
                                                    Last Documented On   
2 3:21PM ; [facility] provided supportive, empa  
thic listening and reflective feedback. ~Explored,   
encouraged, and supported the pt to discuss current sx/mood, assess risk for   
harm/need, coping mechanisms, support network and safety planning. ~Supported 
pt's   
plan to f/up with Dr. Alonso, as planned at [place], [place]. ~Encouraged 
pt to   
use safety plan, if needed to ensure she remains safe  
   
                                                    Last Documented On   
2 2:27PM ; [facility]  
   
                                                    Discussed nutritional needs   
teach healthy choices including fruits and   
vegetables  
   
                                                    Last Documented On   
2 3:20PM ; [facility]  
   
                                                    Patient education about a pr  
oper diet  
   
                                                    Last Documented On   
2 3:20PM ; [facility]  
   
                                                    Discussed concerns about exe  
rcise : promote physical activity ~ ~Will restart   
trazodone and prazosin ~ ~Patient is planning to have brother stay with her for 
a few   
days for emotional support ~ ~Follow up with PCP at next scheduled visit ~ ~Call
  
psychiatrist office to schedule appt ~ ~Call counselor  
   
                                                    Last Documented On   
2 9:35AM ; [facility]  
   
                                                    Provided supportive listenin  
g and empathic feedback; encouraged, explored, and   
supported the pt as she processed current symptoms, Issues, and concerns. 
~Discussed   
past tx and explored current needs/options. Acknowledged and validated pt's 
thoughts   
and emotions. ~Explored coping mechanisms and support network; utilized 
opportunity   
for safety planning; promoted seeking positive support and seeking help, as 
needed  
   
                                                    Last Documented On   
2 3:28PM ; [facility] provided active listenin  
g, support and helped pt process though current   
symptoms   
and stressor(s). Discussed and explored past effectiveness of medication; 
identified   
objectives and future goals; promoted use of healthy coping mechanisms, and 
self-care   
practices  
   
                                                    Last Documented On   
2 3:19PM ; [facility]  
   
                                                    Reviewed side effects and Ri  
sks/Benefits analysis  
   
                                                    Last Documented On   
2 3:19PM ; [facility]  
   
                                                    Discussed nutritional needs   
teach healthy choices including fruits and   
vegetables  
   
                                                    Last Documented On   
2 3:15PM ; [facility]  
   
                                                    Patient education about a pr  
oper diet  
   
                                                    Last Documented On   
2 3:15PM ; [facility]  
   
                                                    Discussed concerns about exe  
rcise : promote physical activity  
   
                                                    Last Documented On   
2 3:15PM ; [facility] introduced pt to [facility] in  
tegrated model of care ~[facility] offered active listening  
and   
supportive feedback; normalized emotions and feelings, also provided pt time to   
process any current stressors ~[facility] discussed potential benefits of counseling 
and   
supported re-engaging, as needed. ~[facility] encouraged pt to continue to make time to
  
implement self-care regimen and use coping methods, as needed  
   
                                                    Last Documented On   
2 4:07PM ; [facility]  
   
                                                    Discussed nutritional needs   
teach healthy choices including fruits and   
vegetables  
   
                                                    Last Documented On   
2 3:15PM ; [facility]  
   
                                                    Patient education about a pr  
oper diet  
   
                                                    Last Documented On   
2 3:15PM ; [facility]  
   
                                                    Inquiry and counseling about  
 medication administration and compliance  
   
                                                    Last Documented On   
2 7:37PM ; [facility]  
   
                                                    Discussed concerns about exe  
rcise : promote physical activity  
   
                                                    Last Documented On   
2 3:15PM ; [facility]  
   
                                                    Patient goals discussed  
   
                                                    Last Documented On   
2 7:37PM ; [facility]  
   
                                                    Ansewred pt's questions re B  
orderlline Personality D/O and Bipolar D/O raised by  
  
psychiatrist at [place]. ~Validated and normalized patient?s feelings while   
assisting to process recent events  
   
                                                    Last Documented On   
1 1:33AM ; [facility]  
   
                                                    Discussed nutritional needs   
teach healthy choices including fruits and   
vegetables  
   
                                                    Last Documented On   
1 2:04PM ; [facility]  
   
                                                    Patient education about a pr  
oper diet  
   
                                                    Last Documented On   
1 2:04PM ; [facility]  
   
                                                    Discussed concerns about exe  
rcise : promote physical activity  
   
                                                    Last Documented On   
1 2:04PM ; [facility] provided active listenin  
g, support and helped patient process through   
current   
symptoms and stressors with ongoing mental health concerns and medication 
changes.   
[facility] discussed coping skills and supports that patient is implementing.  
discussed   
implementing coping skills as discussed with counseling and attending weekly   
appointments as scheduled with counselor. Patient was encouraged to continue 
writing   
down concerns with medications and discuss with providers. [facility] reminded patient
of   
crisis resources should they be needed. Patient reports having crisis resources 
and   
could return to ER  
   
                                                    Last Documented On   
1 10:17AM ; [facility]  
   
                                                    Patient education about a pr  
oper diet  
   
                                                    Last Documented On  5:17PM ; [facility]  
   
                                                    Patient education about meal  
 planning  
   
                                                    Last Documented On  5:17PM ; [facility]  
   
                                                    Education about changing eat  
ing habits  
   
                                                    Last Documented On  5:17PM ; [facility]  
   
                                                    Patient education about high  
 fiber diet  
   
                                                    Last Documented On  5:17PM ; [facility]  
   
                                                    Patient education about low   
fat diet  
   
                                                    Last Documented On  5:17PM ; [facility]  
   
                                                    Patient education about low   
cholesterol diet  
   
                                                    Last Documented On  5:17PM ; [facility]  
   
                                                    Patient education about low   
carbohydrate diet  
   
                                                    Last Documented On  5:17PM ; [facility]  
   
                                                    Patient education about high  
 protein diet  
   
                                                    Last Documented On  5:17PM ; [facility] offered active and suppo  
rtive listening, normalized emotions and feelings,   
and   
processed current stressors. [facility] discussed resources for finding a counselor 
and   
provided list of local resources. [facility] discussed patients coping skills and 
supports   
and encouraged patient to continue to implement. [facility] reminded patient of crisis
  
resources should they be needed  
   
                                                    Last Documented On   
1 7:15PM ; [facility]  
   
                                                    Discussed nutritional needs   
teach healthy choices including fruits and   
vegetables  
   
                                                    Last Documented On   
1 11:38AM ; [facility]  
   
                                                    Patient education about a pr  
oper diet  
   
                                                    Last Documented On   
1 11:38AM ; [facility]  
   
                                                    Patient education about a pr  
oper diet  
   
                                                    Last Documented On  12:19PM ; [facility]  
   
                                                    Patient education about meal  
 planning  
   
                                                    Last Documented On  12:19PM ; [facility]  
   
                                                    Education about changing eat  
ing habits  
   
                                                    Last Documented On   
1 12:19PM ; [facility]  
   
                                                    Patient education about high  
 fiber diet  
   
                                                    Last Documented On   
1 12:19PM ; [facility]  
   
                                                    Patient education about low   
fat diet  
   
                                                    Last Documented On   
1 12:19PM ; [facility]  
   
                                                    Patient education about low   
cholesterol diet  
   
                                                    Last Documented On   
1 12:19PM ; [facility]  
   
                                                    Patient education about low   
carbohydrate diet  
   
                                                    Last Documented On   
1 12:19PM ; [facility]  
   
                                                    Patient education about high  
 protein diet  
   
                                                    Last Documented On   
1 12:19PM ; [facility]  
   
                                                    Discussed concerns about exe  
rcise : promote physical activity  
   
                                                    Last Documented On   
1 11:38AM ; [facility] provided active listenin  
g, support and helped patient process through   
current   
symptoms and stressors related to family conflict. ~P discussed coping skills 
and   
supports with patient that can be implemented and reminded patient of ways to 
access   
additional resources. ~[facility] discussed crisis resources and plan. Patient has 
crisis   
resources still available should they be needed  
   
                                                    Last Documented On 06/10/202  
1 7:04PM ; [facility]  
   
                                                    Discussed nutritional needs   
teach healthy choices including fruits and   
vegetables  
   
                                                    Last Documented On 06/10/202  
1 3:57PM ; [facility]  
   
                                                    Patient education about a pr  
oper diet  
   
                                                    Last Documented On 06/10/202  
1 3:57PM ; [facility]  
   
                                                    Discussed concerns about exe  
rcise : promote physical activity  
   
                                                    Last Documented On 06/10/202  
1 3:57PM ; [facility]. [facility] and PCP reassured   
patient of not sharing information with anyone unless she has signed a release 
for us   
to do so. ~P provided active listening, support and helped patient process 
through   
current symptoms and stressors. ~P discussed establishing counseling and   
psychiatry. P discussed EMDR therapy and ways to find provider who does this 
type   
of therapy. ~[facility] discussed crisis resources should mood worsen, P provided 
text   
hotline number for crisis. P reviewed crisis plan with patient and patient is 
able   
to contact positive supports and family when feeling down  
   
                                                    Last Documented On   
1 11:46AM ; [facility]  
   
                                                    Discussed nutritional needs   
teach healthy choices including fruits and   
vegetables  
   
                                                    Last Documented On   
1 2:11PM ; [facility]  
   
                                                    Patient education about a pr  
oper diet  
   
                                                    Last Documented On   
1 2:11PM ; [facility]  
   
                                                    Discussed concerns about exe  
rcise : promote physical activity  
   
                                                    Last Documented On   
1 2:11PM ; [facility]  
Work Phone: [phone]  
  
Includes: Instructions for all patient encounters  
  
  
  
                                                    Education and Decision Aids   
were provided during visit for:  
   
                                                    [facility] offered active and suppo  
rtive listening and processed current stressors.   
~[facility]   
discussed coping skills and supports that can be implemented to manage increased
  
anxiety and stressors. [facility] discussed potential of resuming counseling, even if 
for   
monthly visits to process ongoing stressors. ~[facility] encouraged patient to follow-
up on   
referrals as discussed with PCP  
   
                                                    Last Documented On   
4 10:08AM ; [facility]  
   
                                                    Discussed nutritional needs   
teach healthy choices including fruits and   
vegetables  
   
                                                    Last Documented On   
4 4:46PM ; [facility]  
   
                                                    Patient education about a pr  
oper diet  
   
                                                    Last Documented On   
4 4:46PM ; [facility]  
   
                                                    Discussed concerns about exe  
rcise : promote physical activity  
   
                                                    Last Documented On   
4 4:46PM ; [facility]  
   
                                                    Not requesting contraception  
   
                                                    Last Documented On   
4 4:46PM ; [facility] offered active and suppo  
rtive listening and processed current stressors   
related   
to getting medications. ~[facility] discussed coping skills and supports to implement 
in   
daily routine. ~[facility] discussed progress patient has felt they have made recently 
and   
encouraged continued follow-up with providers to address health  
   
                                                    Last Documented On   
4 5:16PM ; [facility]  
   
                                                    Discussed nutritional needs   
teach healthy choices including fruits and   
vegetables  
   
                                                    Last Documented On   
4 7:14PM ; [facility]  
   
                                                    Patient education about a pr  
oper diet  
   
                                                    Last Documented On   
4 7:14PM ; [facility]  
   
                                                    Discussed concerns about exe  
rcise : promote physical activity  
   
                                                    Last Documented On   
4 7:14PM ; [facility]  
   
                                                    Not requesting contraception  
   
                                                    Last Documented On   
4 7:14PM ; [facility]  
   
                                                    Discussed nutritional needs   
teach healthy choices including fruits and   
vegetables  
   
                                                    Last Documented On   
3 5:15PM ; [facility]  
   
                                                    Patient education about a pr  
oper diet  
   
                                                    Last Documented On   
3 5:15PM ; [facility]  
   
                                                    Discussed concerns about exe  
rcise : promote physical activity  
   
                                                    Last Documented On   
3 5:15PM ; [facility]  
   
                                                    Discussed nutritional needs   
teach healthy choices including fruits and   
vegetables  
   
                                                    Last Documented On 10/21/202  
2 1:42PM ; [facility]  
   
                                                    Patient education about a pr  
oper diet  
   
                                                    Last Documented On 10/21/202  
2 1:42PM ; [facility]  
   
                                                    Discussed concerns about exe  
rcise : promote physical activity  
   
                                                    Last Documented On 10/21/202  
2 1:42PM ; [facility]  
 and supportive feedback; normalized emotions and   
feelings, also provided pt time to process any current stressors. ~Promoted and   
encouraged follow-through with scheduling psychiatric services  
   
                                                    Last Documented On   
2 3:21PM ; [facility] provided supportive, empa  
thic listening and reflective feedback. ~Explored,   
encouraged, and supported the pt to discuss current sx/mood, assess risk for   
harm/need, coping mechanisms, support network and safety planning. ~Supported 
pt's   
plan to f/up with Dr. Alonso, as planned at [place], [place]. ~Encouraged 
pt to   
use safety plan, if needed to ensure she remains safe  
   
                                                    Last Documented On   
2 2:27PM ; [facility]  
   
                                                    Discussed nutritional needs   
teach healthy choices including fruits and   
vegetables  
   
                                                    Last Documented On   
2 3:20PM ; [facility]  
   
                                                    Patient education about a pr  
oper diet  
   
                                                    Last Documented On   
2 3:20PM ; [facility]  
   
                                                    Discussed concerns about exe  
rcise : promote physical activity ~ ~Will restart   
trazodone and prazosin ~ ~Patient is planning to have brother stay with her for 
a few   
days for emotional support ~ ~Follow up with PCP at next scheduled visit ~ ~Call
  
psychiatrist office to schedule appt ~ ~Call counselor  
   
                                                    Last Documented On   
2 9:35AM ; [facility]  
   
                                                    Provided supportive listenin  
g and empathic feedback; encouraged, explored, and   
supported the pt as she processed current symptoms, Issues, and concerns. 
~Discussed   
past tx and explored current needs/options. Acknowledged and validated pt's 
thoughts   
and emotions. ~Explored coping mechanisms and support network; utilized 
opportunity   
for safety planning; promoted seeking positive support and seeking help, as 
needed  
   
                                                    Last Documented On   
2 3:28PM ; [facility] provided active listenin  
g, support and helped pt process though current   
symptoms   
and stressor(s). Discussed and explored past effectiveness of medication; 
identified   
objectives and future goals; promoted use of healthy coping mechanisms, and 
self-care   
practices  
   
                                                    Last Documented On   
2 3:19PM ; [facility]  
   
                                                    Reviewed side effects and Ri  
sks/Benefits analysis  
   
                                                    Last Documented On   
2 3:19PM ; [facility]  
   
                                                    Discussed nutritional needs   
teach healthy choices including fruits and   
vegetables  
   
                                                    Last Documented On   
2 3:15PM ; [facility]  
   
                                                    Patient education about a pr  
oper diet  
   
                                                    Last Documented On   
2 3:15PM ; [facility]  
   
                                                    Discussed concerns about exe  
rcise : promote physical activity  
   
                                                    Last Documented On   
2 3:15PM ; [facility] introduced pt to [facility] in  
tegrated model of care ~[facility] offered active listening  
and   
supportive feedback; normalized emotions and feelings, also provided pt time to   
process any current stressors ~[facility] discussed potential benefits of counseling 
and   
supported re-engaging, as needed. ~[facility] encouraged pt to continue to make time to
  
implement self-care regimen and use coping methods, as needed  
   
                                                    Last Documented On   
2 4:07PM ; [facility]  
   
                                                    Discussed nutritional needs   
teach healthy choices including fruits and   
vegetables  
   
                                                    Last Documented On   
2 3:15PM ; [facility]  
   
                                                    Patient education about a pr  
oper diet  
   
                                                    Last Documented On   
2 3:15PM ; [facility]  
   
                                                    Inquiry and counseling about  
 medication administration and compliance  
   
                                                    Last Documented On   
2 7:37PM ; [facility]  
   
                                                    Discussed concerns about exe  
rcise : promote physical activity  
   
                                                    Last Documented On   
2 3:15PM ; [facility]  
   
                                                    Patient goals discussed  
   
                                                    Last Documented On   
2 7:37PM ; [facility]  
   
                                                    Ansewred pt's questions re B  
orderlline Personality D/O and Bipolar D/O raised by  
  
psychiatrist at [place]. ~Validated and normalized patient?s feelings while   
assisting to process recent events  
   
                                                    Last Documented On   
1 1:33AM ; [facility]  
   
                                                    Discussed nutritional needs   
teach healthy choices including fruits and   
vegetables  
   
                                                    Last Documented On   
1 2:04PM ; [facility]  
   
                                                    Patient education about a pr  
oper diet  
   
                                                    Last Documented On   
1 2:04PM ; [facility]  
   
                                                    Discussed concerns about exe  
rcise : promote physical activity  
   
                                                    Last Documented On   
1 2:04PM ; [facility] provided active listenin  
g, support and helped patient process through   
current   
symptoms and stressors with ongoing mental health concerns and medication 
changes.   
~[facility] discussed coping skills and supports that patient is implementing.  
discussed   
implementing coping skills as discussed with counseling and attending weekly   
appointments as scheduled with counselor. Patient was encouraged to continue 
writing   
down concerns with medications and discuss with providers. ~P reminded patient
of   
crisis resources should they be needed. Patient reports having crisis resources 
and   
could return to ER  
   
                                                    Last Documented On   
1 10:17AM ; [facility]  
   
                                                    Patient education about a pr  
oper diet  
   
                                                    Last Documented On   
1 5:17PM ; [facility]  
   
                                                    Patient education about meal  
 planning  
   
                                                    Last Documented On   
1 5:17PM ; [facility]  
   
                                                    Education about changing eat  
ing habits  
   
                                                    Last Documented On   
1 5:17PM ; [facility]  
   
                                                    Patient education about high  
 fiber diet  
   
                                                    Last Documented On   
1 5:17PM ; [facility]  
   
                                                    Patient education about low   
fat diet  
   
                                                    Last Documented On   
1 5:17PM ; [facility]  
   
                                                    Patient education about low   
cholesterol diet  
   
                                                    Last Documented On   
1 5:17PM ; [facility]  
   
                                                    Patient education about low   
carbohydrate diet  
   
                                                    Last Documented On   
1 5:17PM ; [facility]  
   
                                                    Patient education about high  
 protein diet  
   
                                                    Last Documented On   
1 5:17PM ; [facility] offered active and suppo  
rtive listening, normalized emotions and feelings,   
and   
processed current stressors. ~[facility] discussed resources for finding a counselor 
and   
provided list of local resources. ~P discussed patients coping skills and 
supports   
and encouraged patient to continue to implement. ~[facility] reminded patient of crisis
  
resources should they be needed  
   
                                                    Last Documented On   
1 7:15PM ; [facility]  
   
                                                    Discussed nutritional needs   
teach healthy choices including fruits and   
vegetables  
   
                                                    Last Documented On   
1 11:38AM ; [facility]  
   
                                                    Patient education about a pr  
oper diet  
   
                                                    Last Documented On   
1 11:38AM ; [facility]  
   
                                                    Patient education about a pr  
oper diet  
   
                                                    Last Documented On   
1 12:19PM ; [facility]  
   
                                                    Patient education about meal  
 planning  
   
                                                    Last Documented On   
1 12:19PM ; [facility]  
   
                                                    Education about changing eat  
ing habits  
   
                                                    Last Documented On   
1 12:19PM ; [facility]  
   
                                                    Patient education about high  
 fiber diet  
   
                                                    Last Documented On   
1 12:19PM ; [facility]  
   
                                                    Patient education about low   
fat diet  
   
                                                    Last Documented On   
1 12:19PM ; [facility]  
   
                                                    Patient education about low   
cholesterol diet  
   
                                                    Last Documented On   
1 12:19PM ; [facility]  
   
                                                    Patient education about low   
carbohydrate diet  
   
                                                    Last Documented On   
1 12:19PM ; [facility]  
   
                                                    Patient education about high  
 protein diet  
   
                                                    Last Documented On   
1 12:19PM ; [facility]  
   
                                                    Discussed concerns about exe  
rcise : promote physical activity  
   
                                                    Last Documented On   
1 11:38AM ; [facility] provided active listenin  
g, support and helped patient process through   
current   
symptoms and stressors related to family conflict. ~[facility] discussed coping skills 
and   
supports with patient that can be implemented and reminded patient of ways to 
access   
additional resources. ~[facility] discussed crisis resources and plan. Patient has 
crisis   
resources still available should they be needed  
   
                                                    Last Documented On 06/10/202  
1 7:04PM ; [facility]  
   
                                                    Discussed nutritional needs   
teach healthy choices including fruits and   
vegetables  
   
                                                    Last Documented On 06/10/202  
1 3:57PM ; [facility]  
   
                                                    Patient education about a pr  
oper diet  
   
                                                    Last Documented On 06/10/202  
1 3:57PM ; [facility]  
   
                                                    Discussed concerns about exe  
rcise : promote physical activity  
   
                                                    Last Documented On 06/10/202  
1 3:57PM ; [facility]. [facility] and PCP reassured   
patient of not sharing information with anyone unless she has signed a release 
for us   
to do so. ~[facility] provided active listening, support and helped patient process 
through   
current symptoms and stressors. ~[facility] discussed establishing counseling and   
psychiatry. P discussed EMDR therapy and ways to find provider who does this 
type   
of therapy. ~[facility] discussed crisis resources should mood worsen, [facility] provided 
text   
hotline number for crisis. [facility] reviewed crisis plan with patient and patient is 
able   
to contact positive supports and family when feeling down  
   
                                                    Last Documented On   
1 11:46AM ; [facility]  
   
                                                    Discussed nutritional needs   
teach healthy choices including fruits and   
vegetables  
   
                                                    Last Documented On   
1 2:11PM ; [facility]  
   
                                                    Patient education about a pr  
oper diet  
   
                                                    Last Documented On   
1 2:11PM ; [facility]  
   
                                                    Discussed concerns about exe  
rcise : promote physical activity  
   
                                                    Last Documented On   
1 2:11PM ; [facility]  
Work Phone: [phone]  
  
Includes: Instructions for all patient encounters  
  
  
  
                                                    Education and Decision Aids   
were provided during visit for:  
   
                                                    Counseling/education [Use fo  
r free text]  
   
                                                    Last Documented On   
4 6:27PM ; [facility]  
   
                                                    Discussed nutritional needs   
teach healthy choices including fruits and   
vegetables  
   
                                                    Last Documented On   
4 4:51PM ; [facility]  
   
                                                    Patient education about a pr  
oper diet  
   
                                                    Last Documented On   
4 4:51PM ; [facility]  
   
                                                    Discussed concerns about exe  
rcise : promote physical activity  
   
                                                    Last Documented On   
4 4:51PM ; [facility]  
   
                                                    Referred Patient to a [facility]  
es Self-Management Program  
   
                                                    Last Documented On   
4 5:22PM ; [facility] offered active and suppo  
rtive listening and processed current stressors.   
~[facility]   
discussed coping skills and supports that can be implemented to manage increased
  
anxiety and stressors. [facility] discussed potential of resuming counseling, even if 
for   
monthly visits to process ongoing stressors. ~[facility] encouraged patient to follow-
up on   
referrals as discussed with PCP  
   
                                                    Last Documented On   
4 10:08AM ; [facility]  
   
                                                    Discussed nutritional needs   
teach healthy choices including fruits and   
vegetables  
   
                                                    Last Documented On   
4 4:46PM ; [facility]  
   
                                                    Patient education about a pr  
oper diet  
   
                                                    Last Documented On   
4 4:46PM ; [facility]  
   
                                                    Discussed concerns about exe  
rcise : promote physical activity  
   
                                                    Last Documented On   
4 4:46PM ; [facility] Women  
   
                                                    Not requesting contraception  
   
                                                    Last Documented On   
4 4:46PM ; formerly [facility] offered active and suppo  
rtive listening and processed current stressors   
related   
to getting medications. ~P discussed coping skills and supports to implement 
in   
daily routine. ~P discussed progress patient has felt they have made recently 
and   
encouraged continued follow-up with providers to address health  
   
                                                    Last Documented On   
4 5:16PM ; [facility]  
   
                                                    Discussed nutritional needs   
teach healthy choices including fruits and   
vegetables  
   
                                                    Last Documented On   
4 7:14PM ; [facility]  
   
                                                    Patient education about a pr  
oper diet  
   
                                                    Last Documented On   
4 7:14PM ; [facility]  
   
                                                    Discussed concerns about exe  
rcise : promote physical activity  
   
                                                    Last Documented On   
4 7:14PM ; [facility]  
   
                                                    Not requesting contraception  
   
                                                    Last Documented On   
4 7:14PM ; [facility]  
   
                                                    Discussed nutritional needs   
teach healthy choices including fruits and   
vegetables  
   
                                                    Last Documented On   
3 5:15PM ; [facility]  
   
                                                    Patient education about a pr  
oper diet  
   
                                                    Last Documented On   
3 5:15PM ; [facility]  
   
                                                    Discussed concerns about exe  
rcise : promote physical activity  
   
                                                    Last Documented On   
3 5:15PM ; [facility]  
   
                                                    Discussed nutritional needs   
teach healthy choices including fruits and   
vegetables  
   
                                                    Last Documented On 10/21/202  
2 1:42PM ; [facility]  
   
                                                    Patient education about a pr  
oper diet  
   
                                                    Last Documented On 10/21/202  
2 1:42PM ; [facility]  
   
                                                    Discussed concerns about exe  
rcise : promote physical activity  
   
                                                    Last Documented On 10/21/202  
2 1:42PM ; formerly [facility] offered active listening  
 and supportive feedback; normalized emotions and   
feelings, also provided pt time to process any current stressors. ~Promoted and   
encouraged follow-through with scheduling psychiatric services  
   
                                                    Last Documented On   
2 3:21PM ; [facility] provided supportive, empa  
thic listening and reflective feedback. ~Explored,   
encouraged, and supported the pt to discuss current sx/mood, assess risk for   
harm/need, coping mechanisms, support network and safety planning. ~Supported 
pt's   
plan to f/up with Dr. Alonso, as planned at [place], [place]. ~Encouraged 
pt to   
use safety plan, if needed to ensure she remains safe  
   
                                                    Last Documented On   
2 2:27PM ; [facility]  
   
                                                    Discussed nutritional needs   
teach healthy choices including fruits and   
vegetables  
   
                                                    Last Documented On   
2 3:20PM ; [facility]  
   
                                                    Patient education about a pr  
oper diet  
   
                                                    Last Documented On   
2 3:20PM ; [facility]  
   
                                                    Discussed concerns about exe  
rcise : promote physical activity ~ ~Will restart   
trazodone and prazosin ~ ~Patient is planning to have brother stay with her for 
a few   
days for emotional support ~ ~Follow up with PCP at next scheduled visit ~ ~Call
  
psychiatrist office to schedule appt ~ ~Call counselor  
   
                                                    Last Documented On   
2 9:35AM ; [facility]  
   
                                                    Provided supportive listenin  
g and empathic feedback; encouraged, explored, and   
supported the pt as she processed current symptoms, Issues, and concerns. 
~Discussed   
past tx and explored current needs/options. Acknowledged and validated pt's 
thoughts   
and emotions. ~Explored coping mechanisms and support network; utilized 
opportunity   
for safety planning; promoted seeking positive support and seeking help, as 
needed  
   
                                                    Last Documented On   
2 3:28PM ; formerly [facility] provided active listenin  
g, support and helped pt process though current   
symptoms   
and stressor(s). Discussed and explored past effectiveness of medication; 
identified   
objectives and future goals; promoted use of healthy coping mechanisms, and 
self-care   
practices  
   
                                                    Last Documented On   
2 3:19PM ; [facility]  
   
                                                    Reviewed side effects and Ri  
sks/Benefits analysis  
   
                                                    Last Documented On   
2 3:19PM ; [facility]  
   
                                                    Discussed nutritional needs   
teach healthy choices including fruits and   
vegetables  
   
                                                    Last Documented On   
2 3:15PM ; [facility]  
   
                                                    Patient education about a pr  
oper diet  
   
                                                    Last Documented On   
2 3:15PM ; [facility]  
   
                                                    Discussed concerns about exe  
rcise : promote physical activity  
   
                                                    Last Documented On   
2 3:15PM ; formerly [facility] introduced pt to [facility] in  
tegrated model of care ~P offered active listening  
and   
supportive feedback; normalized emotions and feelings, also provided pt time to   
process any current stressors ~P discussed potential benefits of counseling 
and   
supported re-engaging, as needed. ~P encouraged pt to continue to make time to
  
implement self-care regimen and use coping methods, as needed  
   
                                                    Last Documented On   
2 4:07PM ; [facility]  
   
                                                    Discussed nutritional needs   
teach healthy choices including fruits and   
vegetables  
   
                                                    Last Documented On   
2 3:15PM ; [facility]  
   
                                                    Patient education about a pr  
oper diet  
   
                                                    Last Documented On   
2 3:15PM ; [facility]  
   
                                                    Inquiry and counseling about  
 medication administration and compliance  
   
                                                    Last Documented On   
2 7:37PM ; [facility]  
   
                                                    Discussed concerns about exe  
rcise : promote physical activity  
   
                                                    Last Documented On   
2 3:15PM ; [facility]  
   
                                                    Patient goals discussed  
   
                                                    Last Documented On   
2 7:37PM ; [facility]  
   
                                                    Ansewred pt's questions re B  
orderlline Personality D/O and Bipolar D/O raised by  
  
psychiatrist at [place]. ~Validated and normalized patient?s feelings while   
assisting to process recent events  
   
                                                    Last Documented On   
1 1:33AM ; [facility]  
   
                                                    Discussed nutritional needs   
teach healthy choices including fruits and   
vegetables  
   
                                                    Last Documented On   
1 2:04PM ; [facility]  
   
                                                    Patient education about a pr  
oper diet  
   
                                                    Last Documented On   
1 2:04PM ; [facility]  
   
                                                    Discussed concerns about exe  
rcise : promote physical activity  
   
                                                    Last Documented On   
1 2:04PM ; formerly [facility] provided active listenin  
g, support and helped patient process through   
current   
symptoms and stressors with ongoing mental health concerns and medication 
changes.   
~[facility] discussed coping skills and supports that patient is implementing.  
discussed   
implementing coping skills as discussed with counseling and attending weekly   
appointments as scheduled with counselor. Patient was encouraged to continue 
writing   
down concerns with medications and discuss with providers. ~[facility] reminded patient
of   
crisis resources should they be needed. Patient reports having crisis resources 
and   
could return to ER  
   
                                                    Last Documented On   
1 10:17AM ; [facility]  
   
                                                    Patient education about a pr  
oper diet  
   
                                                    Last Documented On   
1 5:17PM ; [facility]  
   
                                                    Patient education about meal  
 planning  
   
                                                    Last Documented On  5:17PM ; [facility]  
   
                                                    Education about changing eat  
ing habits  
   
                                                    Last Documented On  5:17PM ; [facility]  
   
                                                    Patient education about high  
 fiber diet  
   
                                                    Last Documented On  5:17PM ; [facility]  
   
                                                    Patient education about low   
fat diet  
   
                                                    Last Documented On   
1 5:17PM ; [facility]  
   
                                                    Patient education about low   
cholesterol diet  
   
                                                    Last Documented On  5:17PM ; [facility]  
   
                                                    Patient education about low   
carbohydrate diet  
   
                                                    Last Documented On  5:17PM ; [facility]  
   
                                                    Patient education about high  
 protein diet  
   
                                                    Last Documented On  5:17PM ; formerly [facility] offered active and suppo  
rtive listening, normalized emotions and feelings,   
and   
processed current stressors. ~[facility] discussed resources for finding a counselor 
and   
provided list of local resources. ~[facility] discussed patients coping skills and 
supports   
and encouraged patient to continue to implement. ~[facility] reminded patient of crisis
  
resources should they be needed  
   
                                                    Last Documented On  7:15PM ; [facility]  
   
                                                    Discussed nutritional needs   
teach healthy choices including fruits and   
vegetables  
   
                                                    Last Documented On  11:38AM ; [facility]  
   
                                                    Patient education about a pr  
oper diet  
   
                                                    Last Documented On  11:38AM ; [facility]  
   
                                                    Patient education about a pr  
oper diet  
   
                                                    Last Documented On   
1 12:19PM ; [facility]  
   
                                                    Patient education about meal  
 planning  
   
                                                    Last Documented On   
1 12:19PM ; [facility]  
   
                                                    Education about changing eat  
ing habits  
   
                                                    Last Documented On  12:19PM ; [facility]  
   
                                                    Patient education about high  
 fiber diet  
   
                                                    Last Documented On  12:19PM ; [facility]  
   
                                                    Patient education about low   
fat diet  
   
                                                    Last Documented On  12:19PM ; [facility]  
   
                                                    Patient education about low   
cholesterol diet  
   
                                                    Last Documented On  12:19PM ; [facility]  
   
                                                    Patient education about low   
carbohydrate diet  
   
                                                    Last Documented On   
1 12:19PM ; [facility]  
   
                                                    Patient education about high  
 protein diet  
   
                                                    Last Documented On   
1 12:19PM ; [facility]  
   
                                                    Discussed concerns about exe  
rcise : promote physical activity  
   
                                                    Last Documented On   
1 11:38AM ; formerly [facility] provided active listenin  
g, support and helped patient process through   
current   
symptoms and stressors related to family conflict. ~[facility] discussed coping skills 
and   
supports with patient that can be implemented and reminded patient of ways to 
access   
additional resources. ~[facility] discussed crisis resources and plan. Patient has 
crisis   
resources still available should they be needed  
   
                                                    Last Documented On 06/10/202  
1 7:04PM ; [facility]  
   
                                                    Discussed nutritional needs   
teach healthy choices including fruits and   
vegetables  
   
                                                    Last Documented On 06/10/202  
1 3:57PM ; [facility]  
   
                                                    Patient education about a pr  
oper diet  
   
                                                    Last Documented On 06/10/202  
1 3:57PM ; [facility]  
   
                                                    Discussed concerns about exe  
rcise : promote physical activity  
   
                                                    Last Documented On 06/10/202  
1 3:57PM ; formerly [facility] introduced patient to [facility] integrated model of care. P and PCP reassured   
patient of not sharing information with anyone unless she has signed a release 
for us   
to do so. ~[facility] provided active listening, support and helped patient process 
through   
current symptoms and stressors. ~[facility] discussed establishing counseling and   
psychiatry. [facility] discussed EMDR therapy and ways to find provider who does this 
type   
of therapy. ~[facility] discussed crisis resources should mood worsen, [facility] provided 
text   
hotline number for crisis. [facility] reviewed crisis plan with patient and patient is 
able   
to contact positive supports and family when feeling down  
   
                                                    Last Documented On   
1 11:46AM ; [facility]  
   
                                                    Discussed nutritional needs   
teach healthy choices including fruits and   
vegetables  
   
                                                    Last Documented On   
1 2:11PM ; [facility]  
   
                                                    Patient education about a pr  
oper diet  
   
                                                    Last Documented On   
1 2:11PM ; [facility]  
   
                                                    Discussed concerns about exe  
rcise : promote physical activity  
   
                                                    Last Documented On   
1 2:11PM ; [facility]  
Work Phone: [phone]  
  
Includes: Instructions for all patient encounters  
  
  
  
                                                    Education and Decision Aids   
were provided during visit for:  
   
                                                    P offered active and suppo  
rtive listening and processed recent stressors. ~P  
  
discussed coping skills and supports to implement in managing increased anxiety 
such   
as tools learned previously in therapy. ~P discussed sleep hygiene strategies 
that   
can be implemented. ~P encouraged patient to follow-up with specialists as   
scheduled  
   
                                                    Last Documented On   
4 3:26PM ; [facility]  
   
                                                    Discussed nutritional needs   
teach healthy choices including fruits and   
vegetables  
   
                                                    Last Documented On   
4 4:51PM ; [facility]  
   
                                                    Patient education about a pr  
oper diet  
   
                                                    Last Documented On   
4 4:51PM ; [facility]  
   
                                                    Discussed concerns about exe  
rcise : promote physical activity  
   
                                                    Last Documented On   
4 4:51PM ; [facility]  
   
                                                    Referred Patient to a [facility]  
es Self-Management Program  
   
                                                    Last Documented On   
4 5:22PM ; formerly [facility] offered active and suppo  
rtive listening and processed current stressors.   
~[facility]   
discussed coping skills and supports that can be implemented to manage increased
  
anxiety and stressors. [facility] discussed potential of resuming counseling, even if 
for   
monthly visits to process ongoing stressors. ~[facility] encouraged patient to follow-
up on   
referrals as discussed with PCP  
   
                                                    Last Documented On   
4 10:08AM ; [facility]  
   
                                                    Discussed nutritional needs   
teach healthy choices including fruits and   
vegetables  
   
                                                    Last Documented On   
4 4:46PM ; [facility]  
   
                                                    Patient education about a pr  
oper diet  
   
                                                    Last Documented On   
4 4:46PM ; [facility]  
   
                                                    Discussed concerns about exe  
rcise : promote physical activity  
   
                                                    Last Documented On   
4 4:46PM ; [facility]  
   
                                                    Not requesting contraception  
   
                                                    Last Documented On   
4 4:46PM ; formerly [facility] offered active and suppo  
rtive listening and processed current stressors   
related   
to getting medications. ~P discussed coping skills and supports to implement 
in   
daily routine. ~P discussed progress patient has felt they have made recently 
and   
encouraged continued follow-up with providers to address health  
   
                                                    Last Documented On   
4 5:16PM ; [facility]  
   
                                                    Discussed nutritional needs   
teach healthy choices including fruits and   
vegetables  
   
                                                    Last Documented On   
4 7:14PM ; [facility]  
   
                                                    Patient education about a pr  
oper diet  
   
                                                    Last Documented On   
4 7:14PM ; [facility]  
   
                                                    Discussed concerns about exe  
rcise : promote physical activity  
   
                                                    Last Documented On   
4 7:14PM ; [facility]  
   
                                                    Not requesting contraception  
   
                                                    Last Documented On   
4 7:14PM ; [facility]  
   
                                                    Discussed nutritional needs   
teach healthy choices including fruits and   
vegetables  
   
                                                    Last Documented On   
3 5:15PM ; [facility]  
   
                                                    Patient education about a pr  
oper diet  
   
                                                    Last Documented On   
3 5:15PM ; [facility]  
   
                                                    Discussed concerns about exe  
rcise : promote physical activity  
   
                                                    Last Documented On   
3 5:15PM ; [facility]  
   
                                                    Discussed nutritional needs   
teach healthy choices including fruits and   
vegetables  
   
                                                    Last Documented On 10/21/202  
2 1:42PM ; [facility]  
   
                                                    Patient education about a pr  
oper diet  
   
                                                    Last Documented On 10/21/202  
2 1:42PM ; [facility]  
   
                                                    Discussed concerns about exe  
rcise : promote physical activity  
   
                                                    Last Documented On 10/21/202  
2 1:42PM ; [facility]  
 and supportive feedback; normalized emotions and   
feelings, also provided pt time to process any current stressors. ~Promoted and   
encouraged follow-through with scheduling psychiatric services  
   
                                                    Last Documented On   
2 3:21PM ; [facility] provided supportive, empa  
thic listening and reflective feedback. ~Explored,   
encouraged, and supported the pt to discuss current sx/mood, assess risk for   
harm/need, coping mechanisms, support network and safety planning. ~Supported 
pt's   
plan to f/up with Dr. Alonso, as planned at [place], [place]. ~Encouraged 
pt to   
use safety plan, if needed to ensure she remains safe  
   
                                                    Last Documented On   
2 2:27PM ; [facility]  
   
                                                    Discussed nutritional needs   
teach healthy choices including fruits and   
vegetables  
   
                                                    Last Documented On   
2 3:20PM ; [facility]  
   
                                                    Patient education about a pr  
oper diet  
   
                                                    Last Documented On   
2 3:20PM ; [facility]  
   
                                                    Discussed concerns about exe  
rcise : promote physical activity ~ ~Will restart   
trazodone and prazosin ~ ~Patient is planning to have brother stay with her for 
a few   
days for emotional support ~ ~Follow up with PCP at next scheduled visit ~ ~Call
  
psychiatrist office to schedule appt ~ ~Call counselor  
   
                                                    Last Documented On   
2 9:35AM ; [facility]  
   
                                                    Provided supportive listenin  
g and empathic feedback; encouraged, explored, and   
supported the pt as she processed current symptoms, Issues, and concerns. 
~Discussed   
past tx and explored current needs/options. Acknowledged and validated pt's 
thoughts   
and emotions. ~Explored coping mechanisms and support network; utilized 
opportunity   
for safety planning; promoted seeking positive support and seeking help, as 
needed  
   
                                                    Last Documented On   
2 3:28PM ; [facility] provided active listenin  
g, support and helped pt process though current   
symptoms   
and stressor(s). Discussed and explored past effectiveness of medication; 
identified   
objectives and future goals; promoted use of healthy coping mechanisms, and 
self-care   
practices  
   
                                                    Last Documented On   
2 3:19PM ; [facility]  
   
                                                    Reviewed side effects and Ri  
sks/Benefits analysis  
   
                                                    Last Documented On   
2 3:19PM ; [facility]  
   
                                                    Discussed nutritional needs   
teach healthy choices including fruits and   
vegetables  
   
                                                    Last Documented On   
2 3:15PM ; [facility]  
   
                                                    Patient education about a pr  
oper diet  
   
                                                    Last Documented On   
2 3:15PM ; [facility]  
   
                                                    Discussed concerns about exe  
rcise : promote physical activity  
   
                                                    Last Documented On   
2 3:15PM ; [facility] introduced pt to [facility] in  
tegrated model of care ~[facility] offered active listening  
and   
supportive feedback; normalized emotions and feelings, also provided pt time to   
process any current stressors ~[facility] discussed potential benefits of counseling 
and   
supported re-engaging, as needed. ~[facility] encouraged pt to continue to make time to
  
implement self-care regimen and use coping methods, as needed  
   
                                                    Last Documented On   
2 4:07PM ; [facility]  
   
                                                    Discussed nutritional needs   
teach healthy choices including fruits and   
vegetables  
   
                                                    Last Documented On   
2 3:15PM ; [facility]  
   
                                                    Patient education about a pr  
oper diet  
   
                                                    Last Documented On   
2 3:15PM ; [facility]  
   
                                                    Inquiry and counseling about  
 medication administration and compliance  
   
                                                    Last Documented On   
2 7:37PM ; [facility]  
   
                                                    Discussed concerns about exe  
rcise : promote physical activity  
   
                                                    Last Documented On   
2 3:15PM ; [facility]  
   
                                                    Patient goals discussed  
   
                                                    Last Documented On   
2 7:37PM ; [facility]  
   
                                                    Ansewred pt's questions re B  
orderlline Personality D/O and Bipolar D/O raised by  
  
psychiatrist at [place]. ~Validated and normalized patient?s feelings while   
assisting to process recent events  
   
                                                    Last Documented On   
1 1:33AM ; [facility]  
   
                                                    Discussed nutritional needs   
teach healthy choices including fruits and   
vegetables  
   
                                                    Last Documented On   
1 2:04PM ; [facility]  
   
                                                    Patient education about a pr  
oper diet  
   
                                                    Last Documented On   
1 2:04PM ; [facility]  
   
                                                    Discussed concerns about exe  
rcise : promote physical activity  
   
                                                    Last Documented On   
1 2:04PM ; [facility]  
   
                                                    [facility] provided active listenin  
g, support and helped patient process through   
current   
symptoms and stressors with ongoing mental health concerns and medication 
changes.   
~[facility] discussed coping skills and supports that patient is implementing.  
discussed   
implementing coping skills as discussed with counseling and attending weekly   
appointments as scheduled with counselor. Patient was encouraged to continue 
writing   
down concerns with medications and discuss with providers. ~[facility] reminded patient
of   
crisis resources should they be needed. Patient reports having crisis resources 
and   
could return to ER  
   
                                                    Last Documented On   
1 10:17AM ; [facility]  
   
                                                    Patient education about a pr  
oper diet  
   
                                                    Last Documented On   
1 5:17PM ; [facility]  
   
                                                    Patient education about meal  
 planning  
   
                                                    Last Documented On   
1 5:17PM ; [facility]  
   
                                                    Education about changing eat  
ing habits  
   
                                                    Last Documented On   
1 5:17PM ; [facility]  
   
                                                    Patient education about high  
 fiber diet  
   
                                                    Last Documented On   
1 5:17PM ; [facility]  
   
                                                    Patient education about low   
fat diet  
   
                                                    Last Documented On   
1 5:17PM ; [facility]  
   
                                                    Patient education about low   
cholesterol diet  
   
                                                    Last Documented On   
1 5:17PM ; [facility]  
   
                                                    Patient education about low   
carbohydrate diet  
   
                                                    Last Documented On   
1 5:17PM ; [facility]  
   
                                                    Patient education about high  
 protein diet  
   
                                                    Last Documented On   
1 5:17PM ; [facility] offered active and suppo  
rtive listening, normalized emotions and feelings,   
and   
processed current stressors. ~[facility] discussed resources for finding a counselor 
and   
provided list of local resources. ~[facility] discussed patients coping skills and 
supports   
and encouraged patient to continue to implement. ~[facility] reminded patient of crisis
  
resources should they be needed  
   
                                                    Last Documented On   
1 7:15PM ; [facility]  
   
                                                    Discussed nutritional needs   
teach healthy choices including fruits and   
vegetables  
   
                                                    Last Documented On   
1 11:38AM ; [facility]  
   
                                                    Patient education about a pr  
oper diet  
   
                                                    Last Documented On   
1 11:38AM ; [facility]  
   
                                                    Patient education about a pr  
oper diet  
   
                                                    Last Documented On   
1 12:19PM ; [facility]  
   
                                                    Patient education about meal  
 planning  
   
                                                    Last Documented On  12:19PM ; [facility]  
   
                                                    Education about changing eat  
ing habits  
   
                                                    Last Documented On   
1 12:19PM ; [facility]  
   
                                                    Patient education about high  
 fiber diet  
   
                                                    Last Documented On   
1 12:19PM ; [facility]  
   
                                                    Patient education about low   
fat diet  
   
                                                    Last Documented On   
1 12:19PM ; [facility]  
   
                                                    Patient education about low   
cholesterol diet  
   
                                                    Last Documented On   
1 12:19PM ; [facility]  
   
                                                    Patient education about low   
carbohydrate diet  
   
                                                    Last Documented On   
1 12:19PM ; [facility]  
   
                                                    Patient education about high  
 protein diet  
   
                                                    Last Documented On   
1 12:19PM ; [facility]  
   
                                                    Discussed concerns about exe  
rcise : promote physical activity  
   
                                                    Last Documented On   
1 11:38AM ; [facility] provided active listenin  
g, support and helped patient process through   
current   
symptoms and stressors related to family conflict. ~[facility] discussed coping skills 
and   
supports with patient that can be implemented and reminded patient of ways to 
access   
additional resources. ~[facility] discussed crisis resources and plan. Patient has 
crisis   
resources still available should they be needed  
   
                                                    Last Documented On 06/10/202  
1 7:04PM ; [facility]  
   
                                                    Discussed nutritional needs   
teach healthy choices including fruits and   
vegetables  
   
                                                    Last Documented On 06/10/202  
1 3:57PM ; [facility]  
   
                                                    Patient education about a pr  
oper diet  
   
                                                    Last Documented On 06/10/202  
1 3:57PM ; [facility]  
   
                                                    Discussed concerns about exe  
rcise : promote physical activity  
   
                                                    Last Documented On 06/10/202  
1 3:57PM ; [facility] introduced patient to [facility] integrated model of care. [facility] and PCP reassured   
patient of not sharing information with anyone unless she has signed a release 
for us   
to do so. ~P provided active listening, support and helped patient process 
through   
current symptoms and stressors. ~P discussed establishing counseling and   
psychiatry. P discussed EMDR therapy and ways to find provider who does this 
type   
of therapy. ~[facility] discussed crisis resources should mood worsen, [facility] provided 
text   
hotline number for crisis. [facility] reviewed crisis plan with patient and patient is 
able   
to contact positive supports and family when feeling down  
   
                                                    Last Documented On   
1 11:46AM ; [facility]  
   
                                                    Discussed nutritional needs   
teach healthy choices including fruits and   
vegetables  
   
                                                    Last Documented On   
1 2:11PM ; [facility]  
   
                                                    Patient education about a pr  
oper diet  
   
                                                    Last Documented On   
1 2:11PM ; [facility]  
   
                                                    Discussed concerns about exe  
rcise : promote physical activity  
   
                                                    Last Documented On   
1 2:11PM ; [facility]  
Work Phone: [phone]  
  
Includes: Instructions for all patient encounters  
  
  
  
                                                    Education and Decision Aids   
were provided during visit for:  
   
                                                    ~*[facility] offered active and sup  
portive listening, normalized emotions and feelings,  
and   
processed ~current stressors. ~*Discussed engageing in counseling and looking 
into   
EMDR to address PTSD and increased nightmares  
   
                                                    Last Documented On   
4 4:14PM ; [facility]  
   
                                                    Discussed nutritional needs   
teach healthy choices including fruits and   
vegetables  
   
                                                    Last Documented On   
4 4:33PM ; [facility]  
   
                                                    Patient education about a pr  
oper diet  
   
                                                    Last Documented On   
4 4:33PM ; [facility]  
   
                                                    Discussed concerns about exe  
rcise : promote physical activity  
   
                                                    Last Documented On   
4 4:33PM ; [facility] offered active and suppo  
rtive listening and processed recent stressors. ~[facility]  
  
discussed coping skills and supports to implement in managing increased anxiety 
such   
as tools learned previously in therapy. ~P discussed sleep hygiene strategies 
that   
can be implemented. ~P encouraged patient to follow-up with specialists as   
scheduled  
   
                                                    Last Documented On   
4 3:26PM ; [facility]  
   
                                                    Discussed nutritional needs   
teach healthy choices including fruits and   
vegetables  
   
                                                    Last Documented On   
4 4:51PM ; [facility]  
   
                                                    Patient education about a pr  
oper diet  
   
                                                    Last Documented On   
4 4:51PM ; [facility]  
   
                                                    Discussed concerns about exe  
rcise : promote physical activity  
   
                                                    Last Documented On   
4 4:51PM ; [facility]  
   
                                                    Referred Patient to a [facility]  
es Self-Management Program  
   
                                                    Last Documented On   
4 5:22PM ; [facility]o  
rtive listening and processed current stressors.   
~P   
discussed coping skills and supports that can be implemented to manage increased
  
anxiety and stressors. P discussed potential of resuming counseling, even if 
for   
monthly visits to process ongoing stressors. ~[facility] encouraged patient to follow-
up on   
referrals as discussed with PCP  
   
                                                    Last Documented On   
4 10:08AM ; [facility]  
   
                                                    Discussed nutritional needs   
teach healthy choices including fruits and   
vegetables  
   
                                                    Last Documented On   
4 4:46PM ; [facility]  
   
                                                    Patient education about a pr  
oper diet  
   
                                                    Last Documented On   
4 4:46PM ; [facility]  
   
                                                    Discussed concerns about exe  
rcise : promote physical activity  
   
                                                    Last Documented On   
4 4:46PM ; [facility]  
   
                                                    Not requesting contraception  
   
                                                    Last Documented On   
4 4:46PM ; [facility] offered active and suppo  
rtive listening and processed current stressors   
related   
to getting medications. ~P discussed coping skills and supports to implement 
in   
daily routine. ~P discussed progress patient has felt they have made recently 
and   
encouraged continued follow-up with providers to address health  
   
                                                    Last Documented On   
4 5:16PM ; [facility]  
   
                                                    Discussed nutritional needs   
teach healthy choices including fruits and   
vegetables  
   
                                                    Last Documented On   
4 7:14PM ; [facility]  
   
                                                    Patient education about a pr  
oper diet  
   
                                                    Last Documented On   
4 7:14PM ; [facility]  
   
                                                    Discussed concerns about exe  
rcise : promote physical activity  
   
                                                    Last Documented On   
4 7:14PM ; [facility]  
   
                                                    Not requesting contraception  
   
                                                    Last Documented On   
4 7:14PM ; [facility]  
   
                                                    Discussed nutritional needs   
teach healthy choices including fruits and   
vegetables  
   
                                                    Last Documented On   
3 5:15PM ; [facility]  
   
                                                    Patient education about a pr  
oper diet  
   
                                                    Last Documented On   
3 5:15PM ; [facility]  
   
                                                    Discussed concerns about exe  
rcise : promote physical activity  
   
                                                    Last Documented On   
3 5:15PM ; [facility]  
   
                                                    Discussed nutritional needs   
teach healthy choices including fruits and   
vegetables  
   
                                                    Last Documented On 10/21/202  
2 1:42PM ; [facility]  
   
                                                    Patient education about a pr  
oper diet  
   
                                                    Last Documented On 10/21/202  
2 1:42PM ; [facility]  
   
                                                    Discussed concerns about exe  
rcise : promote physical activity  
   
                                                    Last Documented On 10/21/202  
2 1:42PM ; [facility]  
 and supportive feedback; normalized emotions and   
feelings, also provided pt time to process any current stressors. ~Promoted and   
encouraged follow-through with scheduling psychiatric services  
   
                                                    Last Documented On   
2 3:21PM ; [facility] provided supportive, empa  
thic listening and reflective feedback. ~Explored,   
encouraged, and supported the pt to discuss current sx/mood, assess risk for   
harm/need, coping mechanisms, support network and safety planning. ~Supported 
pt's   
plan to f/up with Dr. Alonso, as planned at [place], [place]. ~Encouraged 
pt to   
use safety plan, if needed to ensure she remains safe  
   
                                                    Last Documented On   
2 2:27PM ; [facility]  
   
                                                    Discussed nutritional needs   
teach healthy choices including fruits and   
vegetables  
   
                                                    Last Documented On   
2 3:20PM ; [facility]  
   
                                                    Patient education about a pr  
oper diet  
   
                                                    Last Documented On   
2 3:20PM ; [facility]  
   
                                                    Discussed concerns about exe  
rcise : promote physical activity ~ ~Will restart   
trazodone and prazosin ~ ~Patient is planning to have brother stay with her for 
a few   
days for emotional support ~ ~Follow up with PCP at next scheduled visit ~ ~Call
  
psychiatrist office to schedule appt ~ ~Call counselor  
   
                                                    Last Documented On   
2 9:35AM ; [facility]  
   
                                                    Provided supportive listenin  
g and empathic feedback; encouraged, explored, and   
supported the pt as she processed current symptoms, Issues, and concerns. 
~Discussed   
past tx and explored current needs/options. Acknowledged and validated pt's 
thoughts   
and emotions. ~Explored coping mechanisms and support network; utilized 
opportunity   
for safety planning; promoted seeking positive support and seeking help, as 
needed  
   
                                                    Last Documented On   
2 3:28PM ; formerly [facility] provided active listenin  
g, support and helped pt process though current   
symptoms   
and stressor(s). Discussed and explored past effectiveness of medication; 
identified   
objectives and future goals; promoted use of healthy coping mechanisms, and 
self-care   
practices  
   
                                                    Last Documented On   
2 3:19PM ; [facility]  
   
                                                    Reviewed side effects and Ri  
sks/Benefits analysis  
   
                                                    Last Documented On   
2 3:19PM ; [facility]  
   
                                                    Discussed nutritional needs   
teach healthy choices including fruits and   
vegetables  
   
                                                    Last Documented On   
2 3:15PM ; [facility]  
   
                                                    Patient education about a pr  
oper diet  
   
                                                    Last Documented On   
2 3:15PM ; [facility]  
   
                                                    Discussed concerns about exe  
rcise : promote physical activity  
   
                                                    Last Documented On   
2 3:15PM ; formerly [facility] introduced pt to [facility] in  
tegrated model of care ~[facility] offered active listening  
and   
supportive feedback; normalized emotions and feelings, also provided pt time to   
process any current stressors ~[facility] discussed potential benefits of counseling 
and   
supported re-engaging, as needed. ~[facility] encouraged pt to continue to make time to
  
implement self-care regimen and use coping methods, as needed  
   
                                                    Last Documented On   
2 4:07PM ; [facility]  
   
                                                    Discussed nutritional needs   
teach healthy choices including fruits and   
vegetables  
   
                                                    Last Documented On   
2 3:15PM ; [facility]  
   
                                                    Patient education about a pr  
oper diet  
   
                                                    Last Documented On   
2 3:15PM ; [facility]  
   
                                                    Inquiry and counseling about  
 medication administration and compliance  
   
                                                    Last Documented On   
2 7:37PM ; [facility]  
   
                                                    Discussed concerns about exe  
rcise : promote physical activity  
   
                                                    Last Documented On   
2 3:15PM ; [facility]  
   
                                                    Patient goals discussed  
   
                                                    Last Documented On   
2 7:37PM ; [facility]  
   
                                                    Ansewred pt's questions re B  
orderlline Personality D/O and Bipolar D/O raised by  
  
psychiatrist at [place]. ~Validated and normalized patient?s feelings while   
assisting to process recent events  
   
                                                    Last Documented On   
1 1:33AM ; [facility]  
   
                                                    Discussed nutritional needs   
teach healthy choices including fruits and   
vegetables  
   
                                                    Last Documented On   
1 2:04PM ; [facility]  
   
                                                    Patient education about a pr  
oper diet  
   
                                                    Last Documented On   
1 2:04PM ; [facility]  
   
                                                    Discussed concerns about exe  
rcise : promote physical activity  
   
                                                    Last Documented On   
1 2:04PM ; [facility]  
   
                                                    [facility] provided active listenin  
g, support and helped patient process through   
current   
symptoms and stressors with ongoing mental health concerns and medication 
changes.   
~P discussed coping skills and supports that patient is implementing.  
discussed   
implementing coping skills as discussed with counseling and attending weekly   
appointments as scheduled with counselor. Patient was encouraged to continue 
writing   
down concerns with medications and discuss with providers. ~P reminded patient
of   
crisis resources should they be needed. Patient reports having crisis resources 
and   
could return to ER  
   
                                                    Last Documented On   
1 10:17AM ; [facility]  
   
                                                    Patient education about a pr  
oper diet  
   
                                                    Last Documented On   
1 5:17PM ; [facility]  
   
                                                    Patient education about meal  
 planning  
   
                                                    Last Documented On   
1 5:17PM ; [facility]  
   
                                                    Education about changing eat  
ing habits  
   
                                                    Last Documented On   
1 5:17PM ; [facility]  
   
                                                    Patient education about high  
 fiber diet  
   
                                                    Last Documented On   
1 5:17PM ; [facility]  
   
                                                    Patient education about low   
fat diet  
   
                                                    Last Documented On   
1 5:17PM ; [facility]  
   
                                                    Patient education about low   
cholesterol diet  
   
                                                    Last Documented On   
1 5:17PM ; [facility]  
   
                                                    Patient education about low   
carbohydrate diet  
   
                                                    Last Documented On   
1 5:17PM ; [facility]  
   
                                                    Patient education about high  
 protein diet  
   
                                                    Last Documented On   
1 5:17PM ; [facility] offered active and suppo  
rtive listening, normalized emotions and feelings,   
and   
processed current stressors. ~[facility] discussed resources for finding a counselor 
and   
provided list of local resources. ~[facility] discussed patients coping skills and 
supports   
and encouraged patient to continue to implement. ~[facility] reminded patient of crisis
  
resources should they be needed  
   
                                                    Last Documented On   
1 7:15PM ; [facility]  
   
                                                    Discussed nutritional needs   
teach healthy choices including fruits and   
vegetables  
   
                                                    Last Documented On   
1 11:38AM ; [facility]  
   
                                                    Patient education about a pr  
oper diet  
   
                                                    Last Documented On   
1 11:38AM ; [facility]  
   
                                                    Patient education about a pr  
oper diet  
   
                                                    Last Documented On   
1 12:19PM ; [facility]  
   
                                                    Patient education about meal  
 planning  
   
                                                    Last Documented On   
1 12:19PM ; [facility]  
   
                                                    Education about changing eat  
ing habits  
   
                                                    Last Documented On  12:19PM ; [facility]  
   
                                                    Patient education about high  
 fiber diet  
   
                                                    Last Documented On   
1 12:19PM ; [facility]  
   
                                                    Patient education about low   
fat diet  
   
                                                    Last Documented On   
1 12:19PM ; [facility]  
   
                                                    Patient education about low   
cholesterol diet  
   
                                                    Last Documented On   
1 12:19PM ; [facility]  
   
                                                    Patient education about low   
carbohydrate diet  
   
                                                    Last Documented On   
1 12:19PM ; [facility]  
   
                                                    Patient education about high  
 protein diet  
   
                                                    Last Documented On   
1 12:19PM ; [facility]  
   
                                                    Discussed concerns about exe  
rcise : promote physical activity  
   
                                                    Last Documented On   
1 11:38AM ; formerly [facility] provided active listenin  
g, support and helped patient process through   
current   
symptoms and stressors related to family conflict. ~[facility] discussed coping skills 
and   
supports with patient that can be implemented and reminded patient of ways to 
access   
additional resources. ~[facility] discussed crisis resources and plan. Patient has 
crisis   
resources still available should they be needed  
   
                                                    Last Documented On 06/10/202  
1 7:04PM ; [facility]  
   
                                                    Discussed nutritional needs   
teach healthy choices including fruits and   
vegetables  
   
                                                    Last Documented On 06/10/202  
1 3:57PM ; [facility]  
   
                                                    Patient education about a pr  
oper diet  
   
                                                    Last Documented On 06/10/202  
1 3:57PM ; [facility]  
   
                                                    Discussed concerns about exe  
rcise : promote physical activity  
   
                                                    Last Documented On 06/10/202  
1 3:57PM ; [facility] introduced patient to [facility] integrated model of care. [facility] and PCP reassured   
patient of not sharing information with anyone unless she has signed a release 
for us   
to do so. ~P provided active listening, support and helped patient process 
through   
current symptoms and stressors. ~P discussed establishing counseling and   
psychiatry. P discussed EMDR therapy and ways to find provider who does this 
type   
of therapy. ~P discussed crisis resources should mood worsen, [facility] provided 
text   
hotline number for crisis. [facility] reviewed crisis plan with patient and patient is 
able   
to contact positive supports and family when feeling down  
   
                                                    Last Documented On   
1 11:46AM ; [facility]  
   
                                                    Discussed nutritional needs   
teach healthy choices including fruits and   
vegetables  
   
                                                    Last Documented On   
1 2:11PM ; [facility]  
   
                                                    Patient education about a pr  
oper diet  
   
                                                    Last Documented On   
1 2:11PM ; [facility]  
   
                                                    Discussed concerns about exe  
rcise : promote physical activity  
   
                                                    Last Documented On   
1 2:11PM ; [facility]  
Work Phone: [phone]  
  
Includes: Instructions for all patient encounters  
  
  
  
                                                    Education and Decision Aids   
were provided during visit for:  
   
                                                    ~*[facility] offered active and sup  
portive listening, normalized emotions and feelings,  
and   
processed ~current stressors. ~*Discussed engageing in counseling and looking 
into   
EMDR to address PTSD and increased nightmares  
   
                                                    Last Documented On   
4 4:14PM ; [facility]  
   
                                                    Discussed nutritional needs   
teach healthy choices including fruits and   
vegetables  
   
                                                    Last Documented On   
4 4:33PM ; [facility]  
   
                                                    Patient education about a pr  
oper diet  
   
                                                    Last Documented On   
4 4:33PM ; [facility]  
   
                                                    Discussed concerns about exe  
rcise : promote physical activity  
   
                                                    Last Documented On   
4 4:33PM ; [facility] offered active and suppo  
rtive listening and processed recent stressors. ~P  
  
discussed coping skills and supports to implement in managing increased anxiety 
such   
as tools learned previously in therapy. ~P discussed sleep hygiene strategies 
that   
can be implemented. ~[facility] encouraged patient to follow-up with specialists as   
scheduled  
   
                                                    Last Documented On   
4 3:26PM ; [facility]  
   
                                                    Discussed nutritional needs   
teach healthy choices including fruits and   
vegetables  
   
                                                    Last Documented On   
4 4:51PM ; [facility]  
   
                                                    Patient education about a pr  
oper diet  
   
                                                    Last Documented On   
4 4:51PM ; [facility]  
   
                                                    Discussed concerns about exe  
rcise : promote physical activity  
   
                                                    Last Documented On   
4 4:51PM ; [facility]  
   
                                                    Referred Patient to a [facility]  
es Self-Management Program  
   
                                                    Last Documented On   
4 5:22PM [facility]  
rtive listening and processed current stressors.   
~P   
discussed coping skills and supports that can be implemented to manage increased
  
anxiety and stressors. [facility] discussed potential of resuming counseling, even if 
for   
monthly visits to process ongoing stressors. ~[facility] encouraged patient to follow-
up on   
referrals as discussed with PCP  
   
                                                    Last Documented On   
4 10:08AM ; [facility]  
   
                                                    Discussed nutritional needs   
teach healthy choices including fruits and   
vegetables  
   
                                                    Last Documented On   
4 4:46PM ; [facility]  
   
                                                    Patient education about a pr  
oper diet  
   
                                                    Last Documented On   
4 4:46PM ; [facility]  
   
                                                    Discussed concerns about exe  
rcise : promote physical activity  
   
                                                    Last Documented On   
4 4:46PM ; [facility]  
   
                                                    Not requesting contraception  
   
                                                    Last Documented On   
4 4:46PM ; [facility] offered active and suppo  
rtive listening and processed current stressors   
related   
to getting medications. ~P discussed coping skills and supports to implement 
in   
daily routine. ~P discussed progress patient has felt they have made recently 
and   
encouraged continued follow-up with providers to address health  
   
                                                    Last Documented On   
4 5:16PM ; [facility]  
   
                                                    Discussed nutritional needs   
teach healthy choices including fruits and   
vegetables  
   
                                                    Last Documented On   
4 7:14PM ; [facility]  
   
                                                    Patient education about a pr  
oper diet  
   
                                                    Last Documented On   
4 7:14PM ; [facility]  
   
                                                    Discussed concerns about exe  
rcise : promote physical activity  
   
                                                    Last Documented On   
4 7:14PM ; [facility]  
   
                                                    Not requesting contraception  
   
                                                    Last Documented On   
4 7:14PM ; [facility]  
   
                                                    Discussed nutritional needs   
teach healthy choices including fruits and   
vegetables  
   
                                                    Last Documented On   
3 5:15PM ; [facility]  
   
                                                    Patient education about a pr  
oper diet  
   
                                                    Last Documented On   
3 5:15PM ; [facility]  
   
                                                    Discussed concerns about exe  
rcise : promote physical activity  
   
                                                    Last Documented On   
3 5:15PM ; [facility]  
   
                                                    Discussed nutritional needs   
teach healthy choices including fruits and   
vegetables  
   
                                                    Last Documented On 10/21/202  
2 1:42PM ; [facility]  
   
                                                    Patient education about a pr  
oper diet  
   
                                                    Last Documented On 10/21/202  
2 1:42PM ; [facility]  
   
                                                    Discussed concerns about exe  
rcise : promote physical activity  
   
                                                    Last Documented On 10/21/202  
2 1:42PM ; [facility]  
 and supportive feedback; normalized emotions and   
feelings, also provided pt time to process any current stressors. ~Promoted and   
encouraged follow-through with scheduling psychiatric services  
   
                                                    Last Documented On   
2 3:21PM ; [facility] provided supportive, empa  
thic listening and reflective feedback. ~Explored,   
encouraged, and supported the pt to discuss current sx/mood, assess risk for   
harm/need, coping mechanisms, support network and safety planning. ~Supported 
pt's   
plan to f/up with Dr. Alonso, as planned at [place], [place]. ~Encouraged 
pt to   
use safety plan, if needed to ensure she remains safe  
   
                                                    Last Documented On   
2 2:27PM ; [facility]  
   
                                                    Discussed nutritional needs   
teach healthy choices including fruits and   
vegetables  
   
                                                    Last Documented On   
2 3:20PM ; [facility]  
   
                                                    Patient education about a pr  
oper diet  
   
                                                    Last Documented On   
2 3:20PM ; [facility]  
   
                                                    Discussed concerns about exe  
rcise : promote physical activity ~ ~Will restart   
trazodone and prazosin ~ ~Patient is planning to have brother stay with her for 
a few   
days for emotional support ~ ~Follow up with PCP at next scheduled visit ~ ~Call
  
psychiatrist office to schedule appt ~ ~Call counselor  
   
                                                    Last Documented On   
2 9:35AM ; [facility]  
   
                                                    Provided supportive listenin  
g and empathic feedback; encouraged, explored, and   
supported the pt as she processed current symptoms, Issues, and concerns. 
~Discussed   
past tx and explored current needs/options. Acknowledged and validated pt's 
thoughts   
and emotions. ~Explored coping mechanisms and support network; utilized 
opportunity   
for safety planning; promoted seeking positive support and seeking help, as 
needed  
   
                                                    Last Documented On   
2 3:28PM ; [facility] provided active listenin  
g, support and helped pt process though current   
symptoms   
and stressor(s). Discussed and explored past effectiveness of medication; 
identified   
objectives and future goals; promoted use of healthy coping mechanisms, and 
self-care   
practices  
   
                                                    Last Documented On   
2 3:19PM ; [facility]  
   
                                                    Reviewed side effects and Ri  
sks/Benefits analysis  
   
                                                    Last Documented On   
2 3:19PM ; [facility]  
   
                                                    Discussed nutritional needs   
teach healthy choices including fruits and   
vegetables  
   
                                                    Last Documented On   
2 3:15PM ; [facility]  
   
                                                    Patient education about a pr  
oper diet  
   
                                                    Last Documented On   
2 3:15PM ; [facility]  
   
                                                    Discussed concerns about exe  
rcise : promote physical activity  
   
                                                    Last Documented On   
2 3:15PM ; formerly [facility] introduced pt to [facility]WO in  
tegrated model of care ~[facility] offered active listening  
and   
supportive feedback; normalized emotions and feelings, also provided pt time to   
process any current stressors ~[facility] discussed potential benefits of counseling 
and   
supported re-engaging, as needed. ~[facility] encouraged pt to continue to make time to
  
implement self-care regimen and use coping methods, as needed  
   
                                                    Last Documented On   
2 4:07PM ; [facility]  
   
                                                    Discussed nutritional needs   
teach healthy choices including fruits and   
vegetables  
   
                                                    Last Documented On   
2 3:15PM ; [facility]  
   
                                                    Patient education about a pr  
oper diet  
   
                                                    Last Documented On   
2 3:15PM ; [facility]  
   
                                                    Inquiry and counseling about  
 medication administration and compliance  
   
                                                    Last Documented On   
2 7:37PM ; [facility]  
   
                                                    Discussed concerns about exe  
rcise : promote physical activity  
   
                                                    Last Documented On   
2 3:15PM ; [facility]  
   
                                                    Patient goals discussed  
   
                                                    Last Documented On   
2 7:37PM ; [facility]  
   
                                                    Ansewred pt's questions re B  
orderlline Personality D/O and Bipolar D/O raised by  
  
psychiatrist at [place]. ~Validated and normalized patient?s feelings while   
assisting to process recent events  
   
                                                    Last Documented On   
1 1:33AM ; [facility]  
   
                                                    Discussed nutritional needs   
teach healthy choices including fruits and   
vegetables  
   
                                                    Last Documented On   
1 2:04PM ; [facility]  
   
                                                    Patient education about a pr  
oper diet  
   
                                                    Last Documented On   
1 2:04PM ; [facility]  
   
                                                    Discussed concerns about exe  
rcise : promote physical activity  
   
                                                    Last Documented On   
1 2:04PM ; formerly [facility] provided active listenin  
g, support and helped patient process through   
current   
symptoms and stressors with ongoing mental health concerns and medication 
changes.   
~P discussed coping skills and supports that patient is implementing.  
discussed   
implementing coping skills as discussed with counseling and attending weekly   
appointments as scheduled with counselor. Patient was encouraged to continue 
writing   
down concerns with medications and discuss with providers. ~P reminded patient
of   
crisis resources should they be needed. Patient reports having crisis resources 
and   
could return to ER  
   
                                                    Last Documented On   
1 10:17AM ; [facility]  
   
                                                    Patient education about a pr  
oper diet  
   
                                                    Last Documented On   
1 5:17PM ; [facility]  
   
                                                    Patient education about meal  
 planning  
   
                                                    Last Documented On   
1 5:17PM ; [facility]  
   
                                                    Education about changing eat  
ing habits  
   
                                                    Last Documented On   
1 5:17PM ; [facility]  
   
                                                    Patient education about high  
 fiber diet  
   
                                                    Last Documented On   
1 5:17PM ; [facility]  
   
                                                    Patient education about low   
fat diet  
   
                                                    Last Documented On   
1 5:17PM ; [facility]  
   
                                                    Patient education about low   
cholesterol diet  
   
                                                    Last Documented On   
1 5:17PM ; [facility]  
   
                                                    Patient education about low   
carbohydrate diet  
   
                                                    Last Documented On   
1 5:17PM ; [facility]  
   
                                                    Patient education about high  
 protein diet  
   
                                                    Last Documented On   
1 5:17PM ; formerly [facility] offered active and suppo  
rtive listening, normalized emotions and feelings,   
and   
processed current stressors. ~P discussed resources for finding a counselor 
and   
provided list of local resources. ~[facility] discussed patients coping skills and 
supports   
and encouraged patient to continue to implement. [facility] reminded patient of crisis
  
resources should they be needed  
   
                                                    Last Documented On   
1 7:15PM ; [facility]  
   
                                                    Discussed nutritional needs   
teach healthy choices including fruits and   
vegetables  
   
                                                    Last Documented On   
1 11:38AM ; [facility]  
   
                                                    Patient education about a pr  
oper diet  
   
                                                    Last Documented On   
1 11:38AM ; [facility]  
   
                                                    Patient education about a pr  
oper diet  
   
                                                    Last Documented On   
1 12:19PM ; [facility]  
   
                                                    Patient education about meal  
 planning  
   
                                                    Last Documented On   
1 12:19PM ; [facility]  
   
                                                    Education about changing eat  
ing habits  
   
                                                    Last Documented On   
1 12:19PM ; [facility]  
   
                                                    Patient education about high  
 fiber diet  
   
                                                    Last Documented On   
1 12:19PM ; [facility]  
   
                                                    Patient education about low   
fat diet  
   
                                                    Last Documented On   
1 12:19PM ; [facility]  
   
                                                    Patient education about low   
cholesterol diet  
   
                                                    Last Documented On   
1 12:19PM ; [facility]  
   
                                                    Patient education about low   
carbohydrate diet  
   
                                                    Last Documented On   
1 12:19PM ; [facility]  
   
                                                    Patient education about high  
 protein diet  
   
                                                    Last Documented On   
1 12:19PM ; [facility]  
   
                                                    Discussed concerns about exe  
rcise : promote physical activity  
   
                                                    Last Documented On   
1 11:38AM ; formerly [facility] provided active listenin  
g, support and helped patient process through   
current   
symptoms and stressors related to family conflict. [facility] discussed coping skills 
and   
supports with patient that can be implemented and reminded patient of ways to 
access   
additional resources. [facility] discussed crisis resources and plan. Patient has 
crisis   
resources still available should they be needed  
   
                                                    Last Documented On 06/10/202  
1 7:04PM ; [facility]  
   
                                                    Discussed nutritional needs   
teach healthy choices including fruits and   
vegetables  
   
                                                    Last Documented On 06/10/202  
1 3:57PM ; [facility]  
   
                                                    Patient education about a pr  
oper diet  
   
                                                    Last Documented On 06/10/202  
1 3:57PM ; [facility]  
   
                                                    Discussed concerns about exe  
rcise : promote physical activity  
   
                                                    Last Documented On 06/10/202  
1 3:57PM [facility]. [facility] and PCP reassured   
patient of not sharing information with anyone unless she has signed a release 
for us   
to do so. ~P provided active listening, support and helped patient process 
through   
current symptoms and stressors. ~P discussed establishing counseling and   
psychiatry. P discussed EMDR therapy and ways to find provider who does this 
type   
of therapy. ~P discussed crisis resources should mood worsen, [facility] provided 
text   
hotline number for crisis. [facility] reviewed crisis plan with patient and patient is 
able   
to contact positive supports and family when feeling down  
   
                                                    Last Documented On   
1 11:46AM ; [facility]  
   
                                                    Discussed nutritional needs   
teach healthy choices including fruits and   
vegetables  
   
                                                    Last Documented On   
1 2:11PM ; [facility]  
   
                                                    Patient education about a pr  
oper diet  
   
                                                    Last Documented On   
1 2:11PM ; [facility]  
   
                                                    Discussed concerns about exe  
rcise : promote physical activity  
   
                                                    Last Documented On   
1 2:11PM ; [facility]  
Work Phone: [phone]  
  
Includes: Instructions for all patient encounters  
  
  
  
                                                    Education and Decision Aids   
were provided during visit for:  
   
                                                    ~*[facility] offered active and sup  
portive listening, normalized emotions and feelings,  
and   
processed ~current stressors. ~*Discussed engageing in counseling and looking 
into   
EMDR to address PTSD and increased nightmares  
   
                                                    Last Documented On   
4 4:14PM ; [facility]  
   
                                                    Discussed nutritional needs   
teach healthy choices including fruits and   
vegetables  
   
                                                    Last Documented On   
4 4:33PM ; [facility]  
   
                                                    Patient education about a pr  
oper diet  
   
                                                    Last Documented On   
4 4:33PM ; [facility]  
   
                                                    Discussed concerns about exe  
rcise : promote physical activity  
   
                                                    Last Documented On   
4 4:33PM ; formerly [facility] offered active and suppo  
rtive listening and processed recent stressors. ~P  
  
discussed coping skills and supports to implement in managing increased anxiety 
such   
as tools learned previously in therapy. ~P discussed sleep hygiene strategies 
that   
can be implemented. ~P encouraged patient to follow-up with specialists as   
scheduled  
   
                                                    Last Documented On   
4 3:26PM ; [facility]  
   
                                                    Discussed nutritional needs   
teach healthy choices including fruits and   
vegetables  
   
                                                    Last Documented On   
4 4:51PM ; [facility]  
   
                                                    Patient education about a pr  
oper diet  
   
                                                    Last Documented On   
4 4:51PM ; [facility]  
   
                                                    Discussed concerns about exe  
rcise : promote physical activity  
   
                                                    Last Documented On   
4 4:51PM ; [facility]  
   
                                                    Referred Patient to a [facility]  
es Self-Management Program  
   
                                                    Last Documented On   
4 5:22PM ; [facility]  
rtive listening and processed current stressors.   
~[facility]   
discussed coping skills and supports that can be implemented to manage increased
  
anxiety and stressors. P discussed potential of resuming counseling, even if 
for   
monthly visits to process ongoing stressors. ~[facility] encouraged patient to follow-
up on   
referrals as discussed with PCP  
   
                                                    Last Documented On   
4 10:08AM ; [facility]  
   
                                                    Discussed nutritional needs   
teach healthy choices including fruits and   
vegetables  
   
                                                    Last Documented On   
4 4:46PM ; [facility]  
   
                                                    Patient education about a pr  
oper diet  
   
                                                    Last Documented On   
4 4:46PM ; [facility]  
   
                                                    Discussed concerns about exe  
rcise : promote physical activity  
   
                                                    Last Documented On   
4 4:46PM ; [facility]  
   
                                                    Not requesting contraception  
   
                                                    Last Documented On   
4 4:46PM ; formerly [facility] offered active and suppo  
rtive listening and processed current stressors   
related   
to getting medications. ~[facility] discussed coping skills and supports to implement 
in   
daily routine. ~[facility] discussed progress patient has felt they have made recently 
and   
encouraged continued follow-up with providers to address health  
   
                                                    Last Documented On   
4 5:16PM ; [facility]  
   
                                                    Discussed nutritional needs   
teach healthy choices including fruits and   
vegetables  
   
                                                    Last Documented On   
4 7:14PM ; [facility]  
   
                                                    Patient education about a pr  
oper diet  
   
                                                    Last Documented On   
4 7:14PM ; [facility]  
   
                                                    Discussed concerns about exe  
rcise : promote physical activity  
   
                                                    Last Documented On   
4 7:14PM ; [facility]  
   
                                                    Not requesting contraception  
   
                                                    Last Documented On   
4 7:14PM ; [facility]  
   
                                                    Discussed nutritional needs   
teach healthy choices including fruits and   
vegetables  
   
                                                    Last Documented On   
3 5:15PM ; [facility]  
   
                                                    Patient education about a pr  
oper diet  
   
                                                    Last Documented On   
3 5:15PM ; [facility]  
   
                                                    Discussed concerns about exe  
rcise : promote physical activity  
   
                                                    Last Documented On   
3 5:15PM ; [facility]  
   
                                                    Discussed nutritional needs   
teach healthy choices including fruits and   
vegetables  
   
                                                    Last Documented On 10/21/202  
2 1:42PM ; [facility]  
   
                                                    Patient education about a pr  
oper diet  
   
                                                    Last Documented On 10/21/202  
2 1:42PM ; [facility]  
   
                                                    Discussed concerns about exe  
rcise : promote physical activity  
   
                                                    Last Documented On 10/21/202  
2 1:42PM ; [facility]  
 and supportive feedback; normalized emotions and   
feelings, also provided pt time to process any current stressors. ~Promoted and   
encouraged follow-through with scheduling psychiatric services  
   
                                                    Last Documented On   
2 3:21PM ; [facility] provided supportive, empa  
thic listening and reflective feedback. ~Explored,   
encouraged, and supported the pt to discuss current sx/mood, assess risk for   
harm/need, coping mechanisms, support network and safety planning. ~Supported 
pt's   
plan to f/up with Dr. Alonso, as planned at [place], [place]. ~Encouraged 
pt to   
use safety plan, if needed to ensure she remains safe  
   
                                                    Last Documented On   
2 2:27PM ; [facility]  
   
                                                    Discussed nutritional needs   
teach healthy choices including fruits and   
vegetables  
   
                                                    Last Documented On   
2 3:20PM ; [facility]  
   
                                                    Patient education about a pr  
oper diet  
   
                                                    Last Documented On   
2 3:20PM ; [facility]  
   
                                                    Discussed concerns about exe  
rcise : promote physical activity ~ ~Will restart   
trazodone and prazosin ~ ~Patient is planning to have brother stay with her for 
a few   
days for emotional support ~ ~Follow up with PCP at next scheduled visit ~ ~Call
  
psychiatrist office to schedule appt ~ ~Call counselor  
   
                                                    Last Documented On   
2 9:35AM ; [facility]  
   
                                                    Provided supportive listenin  
g and empathic feedback; encouraged, explored, and   
supported the pt as she processed current symptoms, Issues, and concerns. 
~Discussed   
past tx and explored current needs/options. Acknowledged and validated pt's 
thoughts   
and emotions. ~Explored coping mechanisms and support network; utilized 
opportunity   
for safety planning; promoted seeking positive support and seeking help, as 
needed  
   
                                                    Last Documented On   
2 3:28PM ; [facility] provided active listenin  
g, support and helped pt process though current   
symptoms   
and stressor(s). Discussed and explored past effectiveness of medication; 
identified   
objectives and future goals; promoted use of healthy coping mechanisms, and 
self-care   
practices  
   
                                                    Last Documented On   
2 3:19PM ; [facility]  
   
                                                    Reviewed side effects and Ri  
sks/Benefits analysis  
   
                                                    Last Documented On   
2 3:19PM ; [facility]  
   
                                                    Discussed nutritional needs   
teach healthy choices including fruits and   
vegetables  
   
                                                    Last Documented On   
2 3:15PM ; [facility]  
   
                                                    Patient education about a pr  
oper diet  
   
                                                    Last Documented On   
2 3:15PM ; [facility]  
   
                                                    Discussed concerns about exe  
rcise : promote physical activity  
   
                                                    Last Documented On   
2 3:15PM ; [facility] introduced pt to [facility]WO in  
tegrated model of care ~[facility] offered active listening  
and   
supportive feedback; normalized emotions and feelings, also provided pt time to   
process any current stressors ~[facility] discussed potential benefits of counseling 
and   
supported re-engaging, as needed. ~[facility] encouraged pt to continue to make time to
  
implement self-care regimen and use coping methods, as needed  
   
                                                    Last Documented On   
2 4:07PM ; [facility]  
   
                                                    Discussed nutritional needs   
teach healthy choices including fruits and   
vegetables  
   
                                                    Last Documented On   
2 3:15PM ; [facility]  
   
                                                    Patient education about a pr  
oper diet  
   
                                                    Last Documented On   
2 3:15PM ; [facility]  
   
                                                    Inquiry and counseling about  
 medication administration and compliance  
   
                                                    Last Documented On   
2 7:37PM ; [facility]  
   
                                                    Discussed concerns about exe  
rcise : promote physical activity  
   
                                                    Last Documented On   
2 3:15PM ; [facility]  
   
                                                    Patient goals discussed  
   
                                                    Last Documented On   
2 7:37PM ; [facility]  
   
                                                    Ansewred pt's questions re B  
orderlline Personality D/O and Bipolar D/O raised by  
  
psychiatrist at [place]. ~Validated and normalized patient?s feelings while   
assisting to process recent events  
   
                                                    Last Documented On   
1 1:33AM ; [facility]  
   
                                                    Discussed nutritional needs   
teach healthy choices including fruits and   
vegetables  
   
                                                    Last Documented On   
1 2:04PM ; [facility]  
   
                                                    Patient education about a pr  
oper diet  
   
                                                    Last Documented On   
1 2:04PM ; [facility]  
   
                                                    Discussed concerns about exe  
rcise : promote physical activity  
   
                                                    Last Documented On   
1 2:04PM ; formerly [facility] provided active listenin  
g, support and helped patient process through   
current   
symptoms and stressors with ongoing mental health concerns and medication 
changes.   
~P discussed coping skills and supports that patient is implementing.  
discussed   
implementing coping skills as discussed with counseling and attending weekly   
appointments as scheduled with counselor. Patient was encouraged to continue 
writing   
down concerns with medications and discuss with providers. ~[facility] reminded patient
of   
crisis resources should they be needed. Patient reports having crisis resources 
and   
could return to ER  
   
                                                    Last Documented On   
1 10:17AM ; [facility]  
   
                                                    Patient education about a pr  
oper diet  
   
                                                    Last Documented On   
1 5:17PM ; [facility]  
   
                                                    Patient education about meal  
 planning  
   
                                                    Last Documented On   
1 5:17PM ; [facility]  
   
                                                    Education about changing eat  
ing habits  
   
                                                    Last Documented On   
1 5:17PM ; [facility]  
   
                                                    Patient education about high  
 fiber diet  
   
                                                    Last Documented On   
1 5:17PM ; [facility]  
   
                                                    Patient education about low   
fat diet  
   
                                                    Last Documented On   
1 5:17PM ; [facility]  
   
                                                    Patient education about low   
cholesterol diet  
   
                                                    Last Documented On   
1 5:17PM ; [facility]  
   
                                                    Patient education about low   
carbohydrate diet  
   
                                                    Last Documented On   
1 5:17PM ; [facility]  
   
                                                    Patient education about high  
 protein diet  
   
                                                    Last Documented On   
1 5:17PM ; formerly [facility] offered active and suppo  
rtive listening, normalized emotions and feelings,   
and   
processed current stressors. ~[facility] discussed resources for finding a counselor 
and   
provided list of local resources. ~[facility] discussed patients coping skills and 
supports   
and encouraged patient to continue to implement. [facility] reminded patient of crisis
  
resources should they be needed  
   
                                                    Last Documented On   
1 7:15PM ; [facility]  
   
                                                    Discussed nutritional needs   
teach healthy choices including fruits and   
vegetables  
   
                                                    Last Documented On   
1 11:38AM ; [facility]  
   
                                                    Patient education about a pr  
oper diet  
   
                                                    Last Documented On   
1 11:38AM ; [facility]  
   
                                                    Patient education about a pr  
oper diet  
   
                                                    Last Documented On   
1 12:19PM ; [facility]  
   
                                                    Patient education about meal  
 planning  
   
                                                    Last Documented On   
1 12:19PM ; [facility]  
   
                                                    Education about changing eat  
ing habits  
   
                                                    Last Documented On   
1 12:19PM ; [facility]  
   
                                                    Patient education about high  
 fiber diet  
   
                                                    Last Documented On   
1 12:19PM ; [facility]  
   
                                                    Patient education about low   
fat diet  
   
                                                    Last Documented On   
1 12:19PM ; [facility]  
   
                                                    Patient education about low   
cholesterol diet  
   
                                                    Last Documented On   
1 12:19PM ; [facility]  
   
                                                    Patient education about low   
carbohydrate diet  
   
                                                    Last Documented On   
1 12:19PM ; [facility]  
   
                                                    Patient education about high  
 protein diet  
   
                                                    Last Documented On   
1 12:19PM ; [facility]  
   
                                                    Discussed concerns about exe  
rcise : promote physical activity  
   
                                                    Last Documented On   
1 11:38AM ; formerly [facility] provided active listenin  
g, support and helped patient process through   
current   
symptoms and stressors related to family conflict. [facility] discussed coping skills 
and   
supports with patient that can be implemented and reminded patient of ways to 
access   
additional resources. [facility] discussed crisis resources and plan. Patient has 
crisis   
resources still available should they be needed  
   
                                                    Last Documented On 06/10/202  
1 7:04PM ; [facility]  
   
                                                    Discussed nutritional needs   
teach healthy choices including fruits and   
vegetables  
   
                                                    Last Documented On 06/10/202  
1 3:57PM ; [facility]  
   
                                                    Patient education about a pr  
oper diet  
   
                                                    Last Documented On 06/10/202  
1 3:57PM ; [facility]  
   
                                                    Discussed concerns about exe  
rcise : promote physical activity  
   
                                                    Last Documented On 06/10/202  
1 3:57PM ; [facility] introduced patient to HP  
WO integrated model of care. [facility] and PCP reassured   
patient of not sharing information with anyone unless she has signed a release 
for us   
to do so. ~P provided active listening, support and helped patient process 
through   
current symptoms and stressors. ~P discussed establishing counseling and   
psychiatry. [facility] discussed EMDR therapy and ways to find provider who does this 
type   
of therapy. ~P discussed crisis resources should mood worsen, [facility] provided 
text   
hotline number for crisis. P reviewed crisis plan with patient and patient is 
able   
to contact positive supports and family when feeling down  
   
                                                    Last Documented On   
1 11:46AM ; [facility]  
   
                                                    Discussed nutritional needs   
teach healthy choices including fruits and   
vegetables  
   
                                                    Last Documented On   
1 2:11PM ; [facility]  
   
                                                    Patient education about a pr  
oper diet  
   
                                                    Last Documented On   
1 2:11PM ; [facility]  
   
                                                    Discussed concerns about exe  
rcise : promote physical activity  
   
                                                    Last Documented On   
1 2:11PM ; [facility]  
Work Phone: [phone]  
  
Includes: Instructions for all patient encounters  
  
  
  
                                                    Education and Decision Aids   
were provided during visit for:  
   
                                                    ~*[facility] offered active and sup  
portive listening, normalized emotions and feelings,  
and   
processed ~current stressors. ~*Discussed engageing in counseling and looking 
into   
EMDR to address PTSD and increased nightmares  
   
                                                    Last Documented On   
4 4:14PM ; [facility]  
   
                                                    Discussed nutritional needs   
teach healthy choices including fruits and   
vegetables  
   
                                                    Last Documented On   
4 4:33PM ; [facility]  
   
                                                    Patient education about a pr  
oper diet  
   
                                                    Last Documented On   
4 4:33PM ; [facility]  
   
                                                    Discussed concerns about exe  
rcise : promote physical activity  
   
                                                    Last Documented On   
4 4:33PM ; formerly [facility] offered active and suppo  
rtive listening and processed recent stressors. ~P  
  
discussed coping skills and supports to implement in managing increased anxiety 
such   
as tools learned previously in therapy. ~P discussed sleep hygiene strategies 
that   
can be implemented. ~[facility] encouraged patient to follow-up with specialists as   
scheduled  
   
                                                    Last Documented On   
4 3:26PM ; [facility]  
   
                                                    Discussed nutritional needs   
teach healthy choices including fruits and   
vegetables  
   
                                                    Last Documented On   
4 4:51PM ; [facility]  
   
                                                    Patient education about a pr  
oper diet  
   
                                                    Last Documented On   
4 4:51PM ; [facility]  
   
                                                    Discussed concerns about exe  
rcise : promote physical activity  
   
                                                    Last Documented On   
4 4:51PM ; [facility]  
   
                                                    Referred Patient to a [facility]  
es Self-Management Program  
   
                                                    Last Documented On   
4 5:22PM ; [facility] offered active and suppo  
rtive listening and processed current stressors.   
~P   
discussed coping skills and supports that can be implemented to manage increased
  
anxiety and stressors. P discussed potential of resuming counseling, even if 
for   
monthly visits to process ongoing stressors. ~[facility] encouraged patient to follow-
up on   
referrals as discussed with PCP  
   
                                                    Last Documented On   
4 10:08AM ; [facility]  
   
                                                    Discussed nutritional needs   
teach healthy choices including fruits and   
vegetables  
   
                                                    Last Documented On   
4 4:46PM ; [facility]  
   
                                                    Patient education about a pr  
oper diet  
   
                                                    Last Documented On   
4 4:46PM ; [facility]  
   
                                                    Discussed concerns about exe  
rcise : promote physical activity  
   
                                                    Last Documented On   
4 4:46PM ; [facility]  
   
                                                    Not requesting contraception  
   
                                                    Last Documented On   
4 4:46PM ; formerly [facility] offered active and suppo  
rtive listening and processed current stressors   
related   
to getting medications. ~[facility] discussed coping skills and supports to implement 
in   
daily routine. ~[facility] discussed progress patient has felt they have made recently 
and   
encouraged continued follow-up with providers to address health  
   
                                                    Last Documented On   
4 5:16PM ; [facility]  
   
                                                    Discussed nutritional needs   
teach healthy choices including fruits and   
vegetables  
   
                                                    Last Documented On   
4 7:14PM ; [facility]  
   
                                                    Patient education about a pr  
oper diet  
   
                                                    Last Documented On   
4 7:14PM ; [facility]  
   
                                                    Discussed concerns about exe  
rcise : promote physical activity  
   
                                                    Last Documented On   
4 7:14PM ; [facility]  
   
                                                    Not requesting contraception  
   
                                                    Last Documented On   
4 7:14PM ; [facility]  
   
                                                    Discussed nutritional needs   
teach healthy choices including fruits and   
vegetables  
   
                                                    Last Documented On   
3 5:15PM ; [facility]  
   
                                                    Patient education about a pr  
oper diet  
   
                                                    Last Documented On   
3 5:15PM ; [facility]  
   
                                                    Discussed concerns about exe  
rcise : promote physical activity  
   
                                                    Last Documented On   
3 5:15PM ; [facility]  
   
                                                    Discussed nutritional needs   
teach healthy choices including fruits and   
vegetables  
   
                                                    Last Documented On 10/21/202  
2 1:42PM ; [facility]  
   
                                                    Patient education about a pr  
oper diet  
   
                                                    Last Documented On 10/21/202  
2 1:42PM ; [facility]  
   
                                                    Discussed concerns about exe  
rcise : promote physical activity  
   
                                                    Last Documented On 10/21/202  
2 1:42PM [facility]  
 and supportive feedback; normalized emotions and   
feelings, also provided pt time to process any current stressors. ~Promoted and   
encouraged follow-through with scheduling psychiatric services  
   
                                                    Last Documented On   
2 3:21PM ; [facility] provided supportive, empa  
thic listening and reflective feedback. ~Explored,   
encouraged, and supported the pt to discuss current sx/mood, assess risk for   
harm/need, coping mechanisms, support network and safety planning. ~Supported 
pt's   
plan to f/up with Dr. Alonso, as planned at [place], [place]. ~Encouraged 
pt to   
use safety plan, if needed to ensure she remains safe  
   
                                                    Last Documented On   
2 2:27PM ; [facility]  
   
                                                    Discussed nutritional needs   
teach healthy choices including fruits and   
vegetables  
   
                                                    Last Documented On   
2 3:20PM ; [facility]  
   
                                                    Patient education about a pr  
oper diet  
   
                                                    Last Documented On   
2 3:20PM ; [facility]  
   
                                                    Discussed concerns about exe  
rcise : promote physical activity ~ ~Will restart   
trazodone and prazosin ~ ~Patient is planning to have brother stay with her for 
a few   
days for emotional support ~ ~Follow up with PCP at next scheduled visit ~ ~Call
  
psychiatrist office to schedule appt ~ ~Call counselor  
   
                                                    Last Documented On   
2 9:35AM ; [facility]  
   
                                                    Provided supportive listenin  
g and empathic feedback; encouraged, explored, and   
supported the pt as she processed current symptoms, Issues, and concerns. 
~Discussed   
past tx and explored current needs/options. Acknowledged and validated pt's 
thoughts   
and emotions. ~Explored coping mechanisms and support network; utilized 
opportunity   
for safety planning; promoted seeking positive support and seeking help, as 
needed  
   
                                                    Last Documented On   
2 3:28PM ; [facility] provided active listenin  
g, support and helped pt process though current   
symptoms   
and stressor(s). Discussed and explored past effectiveness of medication; 
identified   
objectives and future goals; promoted use of healthy coping mechanisms, and 
self-care   
practices  
   
                                                    Last Documented On   
2 3:19PM ; [facility]  
   
                                                    Reviewed side effects and Ri  
sks/Benefits analysis  
   
                                                    Last Documented On   
2 3:19PM ; [facility]  
   
                                                    Discussed nutritional needs   
teach healthy choices including fruits and   
vegetables  
   
                                                    Last Documented On   
2 3:15PM ; [facility]  
   
                                                    Patient education about a pr  
oper diet  
   
                                                    Last Documented On   
2 3:15PM ; [facility]  
   
                                                    Discussed concerns about exe  
rcise : promote physical activity  
   
                                                    Last Documented On   
2 3:15PM ; [facility] introduced pt to HPWO in  
tegrated model of care ~[facility] offered active listening  
and   
supportive feedback; normalized emotions and feelings, also provided pt time to   
process any current stressors ~[facility] discussed potential benefits of counseling 
and   
supported re-engaging, as needed. ~[facility] encouraged pt to continue to make time to
  
implement self-care regimen and use coping methods, as needed  
   
                                                    Last Documented On   
2 4:07PM ; [facility]  
   
                                                    Discussed nutritional needs   
teach healthy choices including fruits and   
vegetables  
   
                                                    Last Documented On   
2 3:15PM ; [facility]  
   
                                                    Patient education about a pr  
oper diet  
   
                                                    Last Documented On   
2 3:15PM ; [facility]  
   
                                                    Inquiry and counseling about  
 medication administration and compliance  
   
                                                    Last Documented On   
2 7:37PM ; [facility]  
   
                                                    Discussed concerns about exe  
rcise : promote physical activity  
   
                                                    Last Documented On   
2 3:15PM ; [facility]  
   
                                                    Patient goals discussed  
   
                                                    Last Documented On   
2 7:37PM ; [facility]  
   
                                                    Ansewred pt's questions re B  
orderlline Personality D/O and Bipolar D/O raised by  
  
psychiatrist at [facility]. ~Validated and normalized patient?s feelings while   
assisting to process recent events  
   
                                                    Last Documented On   
1 1:33AM ; [facility]  
   
                                                    Discussed nutritional needs   
teach healthy choices including fruits and   
vegetables  
   
                                                    Last Documented On   
1 2:04PM ; [facility]  
   
                                                    Patient education about a pr  
oper diet  
   
                                                    Last Documented On   
1 2:04PM ; [facility]  
   
                                                    Discussed concerns about exe  
rcise : promote physical activity  
   
                                                    Last Documented On   
1 2:04PM ; [facility] provided active listenin  
g, support and helped patient process through   
current   
symptoms and stressors with ongoing mental health concerns and medication 
changes.   
~[facility] discussed coping skills and supports that patient is implementing.  
discussed   
implementing coping skills as discussed with counseling and attending weekly   
appointments as scheduled with counselor. Patient was encouraged to continue 
writing   
down concerns with medications and discuss with providers. ~P reminded patient
of   
crisis resources should they be needed. Patient reports having crisis resources 
and   
could return to ER  
   
                                                    Last Documented On   
1 10:17AM ; [facility] [facility]  
   
                                                    Patient education about a pr  
oper diet  
   
                                                    Last Documented On   
1 5:17PM ; [facility]  
   
                                                    Patient education about meal  
 planning  
   
                                                    Last Documented On   
1 5:17PM ; [facility]  
   
                                                    Education about changing eat  
ing habits  
   
                                                    Last Documented On   
1 5:17PM ; [facility]  
   
                                                    Patient education about high  
 fiber diet  
   
                                                    Last Documented On   
1 5:17PM ; [facility]  
   
                                                    Patient education about low   
fat diet  
   
                                                    Last Documented On   
1 5:17PM ; [facility]  
   
                                                    Patient education about low   
cholesterol diet  
   
                                                    Last Documented On   
1 5:17PM ; [facility]  
   
                                                    Patient education about low   
carbohydrate diet  
   
                                                    Last Documented On   
1 5:17PM ; [facility]  
   
                                                    Patient education about high  
 protein diet  
   
                                                    Last Documented On   
1 5:17PM ; [facility] offered active and suppo  
rtive listening, normalized emotions and feelings,   
and   
processed current stressors. ~[facility] discussed resources for finding a counselor 
and   
provided list of local resources. ~P discussed patients coping skills and 
supports   
and encouraged patient to continue to implement. ~[facility] reminded patient of crisis
  
resources should they be needed  
   
                                                    Last Documented On   
1 7:15PM ; [facility]  
   
                                                    Discussed nutritional needs   
teach healthy choices including fruits and   
vegetables  
   
                                                    Last Documented On   
1 11:38AM ; [facility]  
   
                                                    Patient education about a pr  
oper diet  
   
                                                    Last Documented On   
1 11:38AM ; [facility]  
   
                                                    Patient education about a pr  
oper diet  
   
                                                    Last Documented On   
1 12:19PM ; [facility]  
   
                                                    Patient education about meal  
 planning  
   
                                                    Last Documented On   
1 12:19PM ; [facility]  
   
                                                    Education about changing eat  
ing habits  
   
                                                    Last Documented On   
1 12:19PM ; [facility]  
   
                                                    Patient education about high  
 fiber diet  
   
                                                    Last Documented On   
1 12:19PM ; [facility]  
   
                                                    Patient education about low   
fat diet  
   
                                                    Last Documented On   
1 12:19PM ; [facility]  
   
                                                    Patient education about low   
cholesterol diet  
   
                                                    Last Documented On   
1 12:19PM ; [facility]  
   
                                                    Patient education about low   
carbohydrate diet  
   
                                                    Last Documented On   
1 12:19PM ; [facility]  
   
                                                    Patient education about high  
 protein diet  
   
                                                    Last Documented On   
1 12:19PM ; [facility]  
   
                                                    Discussed concerns about exe  
rcise : promote physical activity  
   
                                                    Last Documented On   
1 11:38AM ; [facility] provided active listenin  
g, support and helped patient process through   
current   
symptoms and stressors related to family conflict. ~[facility] discussed coping skills 
and   
supports with patient that can be implemented and reminded patient of ways to 
access   
additional resources. ~[facility] discussed crisis resources and plan. Patient has 
crisis   
resources still available should they be needed  
   
                                                    Last Documented On 06/10/202  
1 7:04PM ; [facility]  
   
                                                    Discussed nutritional needs   
teach healthy choices including fruits and   
vegetables  
   
                                                    Last Documented On 06/10/202  
1 3:57PM ; [facility]  
   
                                                    Patient education about a pr  
oper diet  
   
                                                    Last Documented On 06/10/202  
1 3:57PM ; [facility]  
   
                                                    Discussed concerns about exe  
rcise : promote physical activity  
   
                                                    Last Documented On 06/10/202  
1 3:57PM ; [facility]. [facility] and PCP reassured   
patient of not sharing information with anyone unless she has signed a release 
for us   
to do so. ~[facility] provided active listening, support and helped patient process 
through   
current symptoms and stressors. ~[facility] discussed establishing counseling and   
psychiatry. [facility] discussed EMDR therapy and ways to find provider who does this 
type   
of therapy. ~[facility] discussed crisis resources should mood worsen, [facility] provided 
text   
hotline number for crisis. [facility] reviewed crisis plan with patient and patient is 
able   
to contact positive supports and family when feeling down  
   
                                                    Last Documented On   
1 11:46AM ; [facility]  
   
                                                    Discussed nutritional needs   
teach healthy choices including fruits and   
vegetables  
   
                                                    Last Documented On   
1 2:11PM ; [facility]  
   
                                                    Patient education about a pr  
oper diet  
   
                                                    Last Documented On   
1 2:11PM ; [facility]  
   
                                                    Discussed concerns about exe  
rcise : promote physical activity  
   
                                                    Last Documented On   
1 2:11PM ; [facility]  
Work Phone: [phone]  
  
Includes: Instructions for all patient encounters  
  
  
  
                                                    Education and Decision Aids   
were provided during visit for:  
   
                                                    ~*[facility] offered active and sup  
portive listening, normalized emotions and feelings,  
and   
processed ~current stressors. ~*Discussed engageing in counseling and looking 
into   
EMDR to address PTSD and increased nightmares  
   
                                                    Last Documented On   
4 4:14PM ; [facility]  
   
                                                    Discussed nutritional needs   
teach healthy choices including fruits and   
vegetables  
   
                                                    Last Documented On   
4 4:33PM ; [facility]  
   
                                                    Patient education about a pr  
oper diet  
   
                                                    Last Documented On   
4 4:33PM ; [facility]  
   
                                                    Discussed concerns about exe  
rcise : promote physical activity  
   
                                                    Last Documented On   
4 4:33PM ; [facility] offered active and suppo  
rtive listening and processed recent stressors. ~[facility]  
  
discussed coping skills and supports to implement in managing increased anxiety 
such   
as tools learned previously in therapy. ~[facility] discussed sleep hygiene strategies 
that   
can be implemented. ~[facility] encouraged patient to follow-up with specialists as   
scheduled  
   
                                                    Last Documented On   
4 3:26PM ; [facility]  
   
                                                    Discussed nutritional needs   
teach healthy choices including fruits and   
vegetables  
   
                                                    Last Documented On   
4 4:51PM ; [facility]  
   
                                                    Patient education about a pr  
oper diet  
   
                                                    Last Documented On   
4 4:51PM ; [facility]  
   
                                                    Discussed concerns about exe  
rcise : promote physical activity  
   
                                                    Last Documented On   
4 4:51PM ; [facility]  
   
                                                    Referred Patient to a [facility]  
es Self-Management Program  
   
                                                    Last Documented On   
4 5:22PM ; [facility]  
rtive listening and processed current stressors.   
~P   
discussed coping skills and supports that can be implemented to manage increased
  
anxiety and stressors. P discussed potential of resuming counseling, even if 
for   
monthly visits to process ongoing stressors. ~[facility] encouraged patient to follow-
up on   
referrals as discussed with PCP  
   
                                                    Last Documented On   
4 10:08AM ; [facility]  
   
                                                    Discussed nutritional needs   
teach healthy choices including fruits and   
vegetables  
   
                                                    Last Documented On   
4 4:46PM ; [facility]  
   
                                                    Patient education about a pr  
oper diet  
   
                                                    Last Documented On   
4 4:46PM ; [facility]  
   
                                                    Discussed concerns about exe  
rcise : promote physical activity  
   
                                                    Last Documented On   
4 4:46PM ; [facility]  
   
                                                    Not requesting contraception  
   
                                                    Last Documented On   
4 4:46PM ; [facility] offered active and suppo  
rtive listening and processed current stressors   
related   
to getting medications. ~[facility] discussed coping skills and supports to implement 
in   
daily routine. ~[facility] discussed progress patient has felt they have made recently 
and   
encouraged continued follow-up with providers to address health  
   
                                                    Last Documented On   
4 5:16PM ; [facility]  
   
                                                    Discussed nutritional needs   
teach healthy choices including fruits and   
vegetables  
   
                                                    Last Documented On   
4 7:14PM ; [facility]  
   
                                                    Patient education about a pr  
oper diet  
   
                                                    Last Documented On   
4 7:14PM ; [facility]  
   
                                                    Discussed concerns about exe  
rcise : promote physical activity  
   
                                                    Last Documented On   
4 7:14PM ; [facility]  
   
                                                    Not requesting contraception  
   
                                                    Last Documented On   
4 7:14PM ; [facility]  
   
                                                    Discussed nutritional needs   
teach healthy choices including fruits and   
vegetables  
   
                                                    Last Documented On   
3 5:15PM ; [facility]  
   
                                                    Patient education about a pr  
oper diet  
   
                                                    Last Documented On   
3 5:15PM ; [facility]  
   
                                                    Discussed concerns about exe  
rcise : promote physical activity  
   
                                                    Last Documented On   
3 5:15PM ; [facility]  
   
                                                    Discussed nutritional needs   
teach healthy choices including fruits and   
vegetables  
   
                                                    Last Documented On 10/21/202  
2 1:42PM ; [facility]  
   
                                                    Patient education about a pr  
oper diet  
   
                                                    Last Documented On 10/21/202  
2 1:42PM ; [facility]  
   
                                                    Discussed concerns about exe  
rcise : promote physical activity  
   
                                                    Last Documented On 10/21/202  
2 1:42PM ; [facility] offered active listening  
 and supportive feedback; normalized emotions and   
feelings, also provided pt time to process any current stressors. ~Promoted and   
encouraged follow-through with scheduling psychiatric services  
   
                                                    Last Documented On   
2 3:21PM ; [facility] provided supportive, empa  
thic listening and reflective feedback. ~Explored,   
encouraged, and supported the pt to discuss current sx/mood, assess risk for   
harm/need, coping mechanisms, support network and safety planning. ~Supported 
pt's   
plan to f/up with Dr. Alonso, as planned at [place], [place]. ~Encouraged 
pt to   
use safety plan, if needed to ensure she remains safe  
   
                                                    Last Documented On   
2 2:27PM ; [facility]  
   
                                                    Discussed nutritional needs   
teach healthy choices including fruits and   
vegetables  
   
                                                    Last Documented On   
2 3:20PM ; [facility]  
   
                                                    Patient education about a pr  
oper diet  
   
                                                    Last Documented On   
2 3:20PM ; [facility]  
   
                                                    Discussed concerns about exe  
rcise : promote physical activity ~ ~Will restart   
trazodone and prazosin ~ ~Patient is planning to have brother stay with her for 
a few   
days for emotional support ~ ~Follow up with PCP at next scheduled visit ~ ~Call
  
psychiatrist office to schedule appt ~ ~Call counselor  
   
                                                    Last Documented On   
2 9:35AM ; [facility]  
   
                                                    Provided supportive listenin  
g and empathic feedback; encouraged, explored, and   
supported the pt as she processed current symptoms, Issues, and concerns. 
~Discussed   
past tx and explored current needs/options. Acknowledged and validated pt's 
thoughts   
and emotions. ~Explored coping mechanisms and support network; utilized 
opportunity   
for safety planning; promoted seeking positive support and seeking help, as 
needed  
   
                                                    Last Documented On   
2 3:28PM ; [facility] provided active listenin  
g, support and helped pt process though current   
symptoms   
and stressor(s). Discussed and explored past effectiveness of medication; 
identified   
objectives and future goals; promoted use of healthy coping mechanisms, and 
self-care   
practices  
   
                                                    Last Documented On   
2 3:19PM ; [facility]  
   
                                                    Reviewed side effects and Ri  
sks/Benefits analysis  
   
                                                    Last Documented On   
2 3:19PM ; [facility]  
   
                                                    Discussed nutritional needs   
teach healthy choices including fruits and   
vegetables  
   
                                                    Last Documented On   
2 3:15PM ; [facility]  
   
                                                    Patient education about a pr  
oper diet  
   
                                                    Last Documented On   
2 3:15PM ; [facility]  
   
                                                    Discussed concerns about exe  
rcise : promote physical activity  
   
                                                    Last Documented On   
2 3:15PM ; [facility] introduced pt to [facility] in  
tegrated model of care ~[facility] offered active listening  
and   
supportive feedback; normalized emotions and feelings, also provided pt time to   
process any current stressors ~[facility] discussed potential benefits of counseling 
and   
supported re-engaging, as needed. ~[facility] encouraged pt to continue to make time to
  
implement self-care regimen and use coping methods, as needed  
   
                                                    Last Documented On   
2 4:07PM ; [facility]  
   
                                                    Discussed nutritional needs   
teach healthy choices including fruits and   
vegetables  
   
                                                    Last Documented On   
2 3:15PM ; [facility]  
   
                                                    Patient education about a pr  
oper diet  
   
                                                    Last Documented On   
2 3:15PM ; [facility]  
   
                                                    Inquiry and counseling about  
 medication administration and compliance  
   
                                                    Last Documented On   
2 7:37PM ; [facility]  
   
                                                    Discussed concerns about exe  
rcise : promote physical activity  
   
                                                    Last Documented On   
2 3:15PM ; [facility]  
   
                                                    Patient goals discussed  
   
                                                    Last Documented On   
2 7:37PM ; [facility]  
   
                                                    Ansewred pt's questions re B  
orderlline Personality D/O and Bipolar D/O raised by  
  
psychiatrist at [place]. ~Validated and normalized patient?s feelings while   
assisting to process recent events  
   
                                                    Last Documented On   
1 1:33AM ; [facility]  
   
                                                    Discussed nutritional needs   
teach healthy choices including fruits and   
vegetables  
   
                                                    Last Documented On   
1 2:04PM ; [facility]  
   
                                                    Patient education about a pr  
oper diet  
   
                                                    Last Documented On   
1 2:04PM ; [facility]  
   
                                                    Discussed concerns about exe  
rcise : promote physical activity  
   
                                                    Last Documented On   
1 2:04PM ; [facility] provided active listenin  
g, support and helped patient process through   
current   
symptoms and stressors with ongoing mental health concerns and medication 
changes.   
~[facility] discussed coping skills and supports that patient is implementing.  
discussed   
implementing coping skills as discussed with counseling and attending weekly   
appointments as scheduled with counselor. Patient was encouraged to continue 
writing   
down concerns with medications and discuss with providers. ~P reminded patient
of   
crisis resources should they be needed. Patient reports having crisis resources 
and   
could return to ER  
   
                                                    Last Documented On   
1 10:17AM ; [facility]  
   
                                                    Patient education about a pr  
oper diet  
   
                                                    Last Documented On   
1 5:17PM ; [facility]  
   
                                                    Patient education about meal  
 planning  
   
                                                    Last Documented On   
1 5:17PM ; [facility]  
   
                                                    Education about changing eat  
ing habits  
   
                                                    Last Documented On   
1 5:17PM ; [facility]  
   
                                                    Patient education about high  
 fiber diet  
   
                                                    Last Documented On   
1 5:17PM ; [facility]  
   
                                                    Patient education about low   
fat diet  
   
                                                    Last Documented On   
1 5:17PM ; [facility]  
   
                                                    Patient education about low   
cholesterol diet  
   
                                                    Last Documented On   
1 5:17PM ; [facility]  
   
                                                    Patient education about low   
carbohydrate diet  
   
                                                    Last Documented On   
1 5:17PM ; [facility]  
   
                                                    Patient education about high  
 protein diet  
   
                                                    Last Documented On   
1 5:17PM ; formerly [facility] offered active and suppo  
rtive listening, normalized emotions and feelings,   
and   
processed current stressors. ~[facility] discussed resources for finding a counselor 
and   
provided list of local resources. ~P discussed patients coping skills and 
supports   
and encouraged patient to continue to implement. ~[facility] reminded patient of crisis
  
resources should they be needed  
   
                                                    Last Documented On   
1 7:15PM ; [facility]  
   
                                                    Discussed nutritional needs   
teach healthy choices including fruits and   
vegetables  
   
                                                    Last Documented On   
1 11:38AM ; [facility]  
   
                                                    Patient education about a pr  
oper diet  
   
                                                    Last Documented On   
1 11:38AM ; [facility]  
   
                                                    Patient education about a pr  
oper diet  
   
                                                    Last Documented On   
1 12:19PM ; [facility]  
   
                                                    Patient education about meal  
 planning  
   
                                                    Last Documented On   
1 12:19PM ; [facility]  
   
                                                    Education about changing eat  
ing habits  
   
                                                    Last Documented On   
1 12:19PM ; [facility]  
   
                                                    Patient education about high  
 fiber diet  
   
                                                    Last Documented On   
1 12:19PM ; [facility]  
   
                                                    Patient education about low   
fat diet  
   
                                                    Last Documented On  12:19PM ; [facility]  
   
                                                    Patient education about low   
cholesterol diet  
   
                                                    Last Documented On   
1 12:19PM ; [facility]  
   
                                                    Patient education about low   
carbohydrate diet  
   
                                                    Last Documented On   
1 12:19PM ; [facility]  
   
                                                    Patient education about high  
 protein diet  
   
                                                    Last Documented On   
1 12:19PM ; [facility]  
   
                                                    Discussed concerns about exe  
rcise : promote physical activity  
   
                                                    Last Documented On   
1 11:38AM [facility]  
g, support and helped patient process through   
current   
symptoms and stressors related to family conflict. ~P discussed coping skills 
and   
supports with patient that can be implemented and reminded patient of ways to 
access   
additional resources. ~P discussed crisis resources and plan. Patient has 
crisis   
resources still available should they be needed  
   
                                                    Last Documented On 06/10/202  
1 7:04PM ; [facility]  
   
                                                    Discussed nutritional needs   
teach healthy choices including fruits and   
vegetables  
   
                                                    Last Documented On 06/10/202  
1 3:57PM ; [facility]  
   
                                                    Patient education about a pr  
oper diet  
   
                                                    Last Documented On 06/10/202  
1 3:57PM ; [facility]  
   
                                                    Discussed concerns about exe  
rcise : promote physical activity  
   
                                                    Last Documented On 06/10/202  
1 3:57PM ; [facility]. [facility] and PCP reassured   
patient of not sharing information with anyone unless she has signed a release 
for us   
to do so. ~P provided active listening, support and helped patient process 
through   
current symptoms and stressors. ~[facility] discussed establishing counseling and   
psychiatry. P discussed EMDR therapy and ways to find provider who does this 
type   
of therapy. ~[facility] discussed crisis resources should mood worsen, [facility] provided 
text   
hotline number for crisis. [facility] reviewed crisis plan with patient and patient is 
able   
to contact positive supports and family when feeling down  
   
                                                    Last Documented On   
1 11:46AM ; [facility]  
   
                                                    Discussed nutritional needs   
teach healthy choices including fruits and   
vegetables  
   
                                                    Last Documented On   
1 2:11PM ; [facility]  
   
                                                    Patient education about a pr  
oper diet  
   
                                                    Last Documented On   
1 2:11PM ; [facility]  
   
                                                    Discussed concerns about exe  
rcise : promote physical activity  
   
                                                    Last Documented On   
1 2:11PM ; [facility]  
Work Phone: [phone] problem outcome Narrative  
  
Includes: Evaluations & Outcomes for active Goals  
  
No [facility]edFree [facility]  
Work Phone: [phone] note*   
  
Progress note  
  
  
  
                                Date            Encounter       Last Documented   
by  
   
                                10/07/2024      Chart Update    Last documented   
on 10/07/2024; 12:07 PM, Doreen Cabrera CNP;   
[facility]  
  
  
  
                                                      
  
  
** Active Problems & Conditions **  
- F90.9 - Attention-deficit Hyperactivity Disorder  
- F31.31 - Bipolar I Disorder, Most Recent Episode, Depressed Mild  
- E11.9 - Diabetes Mellitus Type 2 Without Complication  
- N94.6 - Dysmenorrhea  
- F43.10 - Post-traumatic Stress Disorder  
  
** Current Medication **  
- Alcohol Pads 70% use to cleanse skin prior to checking blood sugars, 90 days, 
3   
refills  
- ARIPiprazole 5 MG Oral Tablet take 1 tablet by mouth once daily, 30 days, 5 
refills  
- Atorvastatin Calcium 20 MG Oral Tablet take 1 tablet by mouth once daily at   
bedtime, 30 days, 5 refills  
- Blood Glucose System Sriram Kit use to check sugars once daily (use what is 
covered),   
30 days, 0 refills  
- busPIRone HCl 10 MG Oral Tablet take 1 tablet by mouth twice daily (d/c 5 mg 
rx),   
30 days, 5 refills  
- CareSens Lancets Miscellaneous use to check sugars once daily (dispense what 
is   
covered), 90 days, 3 refills  
- Colace 100 MG Oral Capsule take 1 capsule by mouth once daily at bedtime as 
needed   
for constipation, 30 days, 5 refills  
- [employer] Iron 325 (65 Fe) MG Oral Tablet take 1 tablet by mouth once daily, 30 
days, 5   
refills  
- Intuniv 1 MG Oral Tablet Extended Release 24 Hour take 1 tablet by mouth once 
daily   
at bedtime, 30 days, 5 refills  
- Januvia 50 MG Oral Tablet take 1 tablet by mouth once daily, 30 days, 5 
refills  
- lamoTRIgine 100 MG Oral Tablet take 1 tablet by mouth once daily, 30 days, 5   
refills  
- Lisinopril 5 MG Oral Tablet take 1 tablet by mouth once daily, 30 days, 5 
refills  
- MiraLax 17 GM/SCOOP Oral Powder mix 1 scoop with 8 ounces once daily, 30 days,
2   
refills  
- Narcan 4 MG/0.1ML Nasal Liquid spray in nostril for symptoms of overdose , may
  
repeat in 2 minutes if symptoms persist, 1 days, 0 refills  
- OneTouch Ultra In Vitro Strip USE ONE TEST STRIP TO CHECK BLOOD SUGARS ONCE 
DAILY,   
90 days, 3 refills  
- Prazosin HCl 1 MG Oral Capsule take 1 capsule by mouth twice daily, 30 days, 5
  
refills  
- SEROquel 50 MG Oral Tablet take 1 tablet by mouth once daily at bedtime (d/c   
trazodone), 30 days, 1 refills  
- Sertraline HCl 50 MG Oral Tablet take 1 tablet by mouth once daily, 30 days, 5
  
refills  
- Slynd 4 MG Oral Tablet take 1 tablet by mouth at the same time everyday to 
help   
regulate your period, 28 days, 2 refills  
  
** Past Medical/Surgical History **  
Reported:  
No Safety Measures. Has sex without a condom.  
Medical: No previous hospitalizations. Chronic illness and Sexually transmitted   
infection Partners sexually transmitted infection status known.  
Immunization History: Recent immunization for flu.  
Exposure: Exposure to COVID-19.  
Pregnancy: Previously pregnant 1 time(s) and para having 0 live birth(s). Not   
planning a pregnancy in the next year.  
Legal Documents: Consent form on file for procedure.  
Diagnoses:  
Polycystic Ovarian Syndrome (PCOS).  
Migraine headache.  
Psychiatric disorders biopolar disorder  
Anxiety disorder  
POTS.  
Procedural:  
- Insertion of ear pressure equalization tubes in both ears  
Surgical:  
- Tonsillectomy  
- Tonsillectomy with adenoidectomy  
  
** Allergies **  
- Abilify Reaction: groggy  
- Augmentin Reaction: Shock  
- Metformin Reaction: Nausea, Vomiting  
- NO KNOWN ENVIRONMENTAL ALLERGIES  
- NO KNOWN FOOD ALLERGIES  
- Sertraline Reaction: slow/groggy  
- Trazodone Hydrochloride Reaction: Panic attack  
  
** Family History **  
Paternal:  
Systemic hypertension  
Oncologic disorder  
Maternal:  
Systemic hypertension  
Epilepsy and recurrent seizures  
Psychiatric disorders  
Oncologic disorder  
Fraternal:  
Psychiatric disorders  
  
** Assessment **  
- D50.9 - Iron deficiency anemia, unspecified  
  
** Plan **  
StartCited- Iron deficiency anemia, unspecified  
Lab: Iron and TIBC  
Lab: CBC With Differential/Platelet  
EndCited  
** Care Team **  
- Doreen Cabrera CNP  
  
  
[facility]No Reason for 
Referral [facility]edFree [facility]  
Work Phone: [phone] of systems Narrative - Reported  
  
Review of Systems not supported for this document type  
  
No Review of Systems RecordedFree [facility]  
Work Phone: [phone]  
  
Summary Purpose  
  
  
                                                      
  
  
  
Family History  
No Family History Records Found  
  
                                        Description         Last Updated  
   
                                        Maternal history of epilepsy and recurre  
nt seizures 2021  
   
                                        Fraternal history of psychiatric disorde  
rs 2021  
   
                                        Maternal history of hypertension   
021  
   
                                        Maternal history of oncologic disorder 0  
2021  
   
                                        Maternal history of psychiatric disorder  
s 2021  
   
                                        Paternal history of hypertension   
021  
   
                                        Paternal history of oncologic disorder 0  
2021  
  
  
  
                                        Description         Last Updated  
   
                                        Maternal history of epilepsy and recurre  
nt seizures 2021  
  
  
  
                                                    Last Documented On   
1 4:06PM ; [facility]  
  
  
  
                                        Fraternal history of psychiatric disorde  
rs 2021  
   
                                        Maternal history of hypertension   
021  
   
                                        Maternal history of oncologic disorder 0  
2021  
   
                                        Maternal history of psychiatric disorder  
s 2021  
   
                                        Paternal history of hypertension   
021  
   
                                        Paternal history of oncologic disorder 0  
2021  
  
  
  
                                        Description         Last Updated  
   
                                        Maternal history of epilepsy and recurre  
nt seizures 2021  
  
  
  
                                                    Last Documented On   
1 4:06PM ; [facility]  
  
  
  
                                        Fraternal history of psychiatric disorde  
rs 2021  
   
                                        Maternal history of hypertension   
021  
   
                                        Maternal history of oncologic disorder 0  
2021  
   
                                        Maternal history of psychiatric disorder  
s 2021  
   
                                        Paternal history of hypertension   
021  
   
                                        Paternal history of oncologic disorder 0  
2021  
  
  
  
                                        Description         Last Updated  
   
                                        Maternal history of epilepsy and recurre  
nt seizures 2021  
  
  
  
                                                    Last Documented On   
1 4:06PM ; [facility]  
  
  
  
                                        Fraternal history of psychiatric disorde  
rs 2021  
   
                                        Maternal history of hypertension   
021  
   
                                        Maternal history of oncologic disorder 0  
2021  
   
                                        Maternal history of psychiatric disorder  
s 2021  
   
                                        Paternal history of hypertension   
021  
   
                                        Paternal history of oncologic disorder 0  
2021  
  
  
  
                                        Description         Last Updated  
   
                                        Maternal history of epilepsy and recurre  
nt seizures 2021  
  
  
  
                                                    Last Documented On   
1 4:06PM ; [facility]  
  
  
  
                                        Fraternal history of psychiatric disorde  
rs 2021  
   
                                        Maternal history of hypertension   
021  
   
                                        Maternal history of oncologic disorder 0  
2021  
   
                                        Maternal history of psychiatric disorder  
s 2021  
   
                                        Paternal history of hypertension   
021  
   
                                        Paternal history of oncologic disorder 0  
2021  
  
  
  
                                        Description         Last Updated  
   
                                        Maternal history of epilepsy and recurre  
nt seizures 2021  
  
  
  
                                                    Last Documented On   
1 4:06PM ; [facility]  
  
  
  
                                        Fraternal history of psychiatric disorde  
rs 2021  
   
                                        Maternal history of hypertension   
021  
   
                                        Maternal history of oncologic disorder 0  
2021  
   
                                        Maternal history of psychiatric disorder  
s 2021  
   
                                        Paternal history of hypertension   
021  
   
                                        Paternal history of oncologic disorder 0  
2021  
  
  
  
                                        Description         Last Updated  
   
                                        Maternal history of epilepsy and recurre  
nt seizures 2021  
  
  
  
                                                    Last Documented On   
1 4:06PM ; [facility]  
  
  
  
                                        Fraternal history of psychiatric disorde  
rs 2021  
   
                                        Maternal history of hypertension   
021  
   
                                        Maternal history of oncologic disorder 0  
2021  
   
                                        Maternal history of psychiatric disorder  
s 2021  
   
                                        Paternal history of hypertension   
021  
   
                                        Paternal history of oncologic disorder 0  
2021  
  
  
  
                                        Description         Last Updated  
   
                                        Maternal history of epilepsy and recurre  
nt seizures 2021  
  
  
  
                                                    Last Documented On   
1 4:06PM ; [facility]  
  
  
  
                                        Fraternal history of psychiatric disorde  
rs 2021  
   
                                        Maternal history of hypertension   
021  
   
                                        Maternal history of oncologic disorder 0  
2021  
   
                                        Maternal history of psychiatric disorder  
s 2021  
   
                                        Paternal history of hypertension   
021  
   
                                        Paternal history of oncologic disorder 0  
2021  
  
  
  
                                        Description         Last Updated  
   
                                        Maternal history of epilepsy and recurre  
nt seizures 2021  
  
  
  
                                                    Last Documented On   
1 4:06PM ; [facility]  
  
  
  
                                        Fraternal history of psychiatric disorde  
rs 2021  
   
                                        Maternal history of hypertension   
021  
   
                                        Maternal history of oncologic disorder 0  
2021  
   
                                        Maternal history of psychiatric disorder  
s 2021  
   
                                        Paternal history of hypertension   
021  
   
                                        Paternal history of oncologic disorder 0  
2021  
  
  
  
                                        Description         Last Updated  
   
                                        Maternal history of epilepsy and recurre  
nt seizures 2021  
  
  
  
                                                    Last Documented On   
1 4:06PM ; [facility]  
  
  
  
                                        Fraternal history of psychiatric disorde  
rs 2021  
   
                                        Maternal history of hypertension   
021  
   
                                        Maternal history of oncologic disorder 0  
2021  
   
                                        Maternal history of psychiatric disorder  
s 2021  
   
                                        Paternal history of hypertension   
021  
   
                                        Paternal history of oncologic disorder 0  
2021  
  
  
  
                                        Description         Last Updated  
   
                                        Maternal history of epilepsy and recurre  
nt seizures 2021  
  
  
  
                                                    Last Documented On   
1 4:06PM ; [facility]  
  
  
  
                                        Fraternal history of psychiatric disorde  
rs 2021  
   
                                        Maternal history of hypertension   
021  
   
                                        Maternal history of oncologic disorder 0  
2021  
   
                                        Maternal history of psychiatric disorder  
s 2021  
   
                                        Paternal history of hypertension   
021  
   
                                        Paternal history of oncologic disorder 0  
2021  
  
  
  
                                        Description         Last Updated  
   
                                        Maternal history of epilepsy and recurre  
nt seizures 2021  
  
  
  
                                                    Last Documented On   
1 4:06PM ; [facility]  
  
  
  
                                        Fraternal history of psychiatric disorde  
rs 2021  
   
                                        Maternal history of hypertension   
021  
   
                                        Maternal history of oncologic disorder 0  
2021  
   
                                        Maternal history of psychiatric disorder  
s 2021  
   
                                        Paternal history of hypertension   
021  
   
                                        Paternal history of oncologic disorder 0  
2021  
  
  
  
                                        Description         Last Updated  
   
                                        Maternal history of epilepsy and recurre  
nt seizures 2021  
  
  
  
                                                    Last Documented On  4:06PM ; [facility]  
  
  
  
                                        Fraternal history of psychiatric disorde  
rs 2021  
   
                                        Maternal history of hypertension   
021  
   
                                        Maternal history of oncologic disorder 0  
2021  
   
                                        Maternal history of psychiatric disorder  
s 2021  
   
                                        Paternal history of hypertension   
021  
   
                                        Paternal history of oncologic disorder 0  
2021  
  
  
  
Advance Directives  
No Advanced Directives Records FoundDocuments on File  
  
                          Type         Date Recorded Patient Representative Expl  
anation  
   
                          ACP-Advance Directive                             
   
                          ACP-Power of                              
  
                                Documents on File  
  
                          Type         Date Recorded Patient Representative Expl  
anation  
   
                          ACP-Advance Directive                             
   
                          ACP-Power of                              
  
  
  
Physical Exam  
  
  
Physical Exam not supported for this document type  
  
No Physical Exam Recorded  
  
Physical Exam not supported for this document type  
  
No Physical Exam Recorded  
  
Physical Exam not supported for this document type  
  
No Physical Exam Recorded  
  
Physical Exam not supported for this document type  
  
No Physical Exam Recorded  
  
Physical Exam not supported for this document type  
  
No Physical Exam Recorded  
  
Physical Exam not supported for this document type  
  
No Physical Exam Recorded  
  
Physical Exam not supported for this document type  
  
No Physical Exam Recorded  
  
Physical Exam not supported for this document type  
  
No Physical Exam Recorded  
  
Physical Exam not supported for this document type  
  
No Physical Exam Recorded  
  
Physical Exam not supported for this document type  
  
No Physical Exam Recorded  
  
Physical Exam not supported for this document type  
  
No Physical Exam Recorded  
  
Physical Exam not supported for this document type  
  
No Physical Exam Recorded  
  
Physical Exam not supported for this document type  
  
No Physical Exam Recorded  
  
Physical Exam not supported for this document type  
  
No Physical Exam Recorded  
  
Physical Exam not supported for this document type  
  
No Physical Exam Recorded  
  
Physical Exam not supported for this document type  
  
No Physical Exam Recorded  
  
Physical Exam not supported for this document type  
  
No Physical Exam Recorded  
  
Physical Exam not supported for this document type  
  
No Physical Exam Recorded  
  
Physical Exam not supported for this document type  
  
No Physical Exam Recorded  
  
Physical Exam not supported for this document type  
  
No Physical Exam Recorded  
  
Physical Exam not supported for this document type  
  
No Physical Exam Recorded  
  
Physical Exam not supported for this document type  
  
No Physical Exam Recorded  
  
Physical Exam not supported for this document type  
  
No Physical Exam Recorded  
  
Physical Exam not supported for this document type  
  
No Physical Exam Recorded  
  
Physical Exam not supported for this document type  
  
No Physical Exam Recorded  
  
Physical Exam not supported for this document type  
  
No Physical Exam Recorded  
  
Physical Exam not supported for this document type  
  
No Physical Exam Recorded  
  
Physical Exam not supported for this document type  
  
No Physical Exam Recorded  
  
Physical Exam not supported for this document type  
  
No Physical Exam Recorded  
  
Reason for Referral  
  
  
                          Status       Reason       Specialty    Diagnoses /   
Procedures                              Referred By   
Contact                                 Referred To   
Contact  
   
                          Pending Review                             
  
  
Diagnoses  
  
  
Tachycardia  
  
  
  
Procedures  
  
  
Holter Monitor 48   
Hour                                      
  
  
Doreen Cabrera APRN - CNP  
  
  
[address]  
  
  
[place]   
[address]  
  
  
Phone:   
[phone]  
  
  
Fax: [phone]                         
  
  
  
  
  
Additional Source Comments  
  
  
  
                                                    INFORMATION SOURCE (unrecogn  
ized section and content)  
   
                                          
  
  
  
                                        DATE CREATED        AUTHOR  
   
                                2018                      [facility]  
  
  
  
                                DATE CREATED    AUTHOR          AUTHOR'S ORGANIZ  
ATION  
   
                                2021                      [facility]  
  
  
  
                                DATE CREATED    AUTHOR          AUTHOR'S ORGANIZ  
ATION  
   
                                2021                      [facility]  
  
  
  
                                DATE CREATED    AUTHOR          AUTHOR'S ORGANIZ  
ATION  
   
                                2021                      [facility]  
  
  
  
                                DATE CREATED    AUTHOR          AUTHOR'S ORGANIZ  
ATION  
   
                                10/05/2021                      [facility]  
  
  
  
                                DATE CREATED    AUTHOR          AUTHOR'S ORGANIZ  
ATION  
   
                                10/25/2022                      [facility]  
  
  
  
                                DATE CREATED    AUTHOR          AUTHOR'S ORGANIZ  
ATION  
   
                                05/10/2024                      [facility]  
  
  
  
                                DATE CREATED    AUTHOR          AUTHOR'S ORGANIZ  
ATION  
   
                                10/22/2024                      [facility]  
[facility]  
  
  
  
  
  
                                                    Reason for Visit (unrecogniz  
ed section and content)  
   
                                          
  
  
  
                                        Reason              Comments  
   
                                        Suicidal            with a plan of overd  
osing on zoloft  
  
  
  
                                        Reason              Comments  
   
                                        Mental Health Problem patient states she  
 is feeling homicidal and wants meds   
adjusted, doesn't feel they are working  
   
                                        Panic Attack          
  
  
  
                                        Reason              Comments  
   
                                        Homicidal           pt states she is on   
a  donw slope  of her bipolar, pt states  I want   
to   
kill anyone who pisses me off   
  
  
  
                          Status       Reason       Specialty    Diagnoses /   
Procedures                              Referred By   
Contact                                 Referred To   
Contact  
   
                          Pending Review                             
  
  
Diagnoses  
  
  
Tachycardia  
  
  
  
Procedures  
  
  
Holter Monitor 48   
Hour                                      
  
  
Doreen Cabrera,   
APRN - CNP  
  
  
[address] [place]   
[address]  
  
  
Phone:   
[phone]  
  
  
Fax: [phone]                         
  
  
  
  
  
                                    Scheduled  
  
                                                    Active and Recently Administ  
ered Medications (unrecognized section and content)  
   
                                          
  
  
  
                          Medication Order 2021  
   
                                                      
  
  
ALPRAZolam (XANAX) tablet 0.5 mg   
(COMPLETED)  
0.5 mg, Oral, ONCE, On Sat   
21 at 2100, For 1 dose  
                                                    2109 (Given - Provider: Dottie Barcenas RN)  
                                          
  
                                       PRN  
  
                          Medication Order 2021  
   
                                                      
  
  
hydrOXYzine (VISTARIL) capsule   
50 mg  
50 mg, Oral, 3 TIMES DAILY PRN,   
Itching, Starting on 21   
at 2147  
                                                            2212 (Given - Provid  
er: Kareem Montiel RN)  
  
  
  
  
  
  
                                                    Care Teams (unrecognized sec  
tion and content)  
   
                                          
  
  
  
                      Team Member Relationship Specialty  Start Date End Date  
   
                                                      
  
  
Doreen Cabrera, APRN - CNP  
  
  
[address] [place] [id number] [phone] (Work)  
  
  
[phone] (Fax) PCP - General   Family Medicine 21           
  
  
FOR RECORDS PERTAINING TO PATIENTS WHO ARE OR HAVE BEEN ENROLLED IN A CHEMICAL 
DEPENDENCY/SUBSTANCEABUSE PROGRAM, SOME INFORMATION MAY BE OMITTED. This 
clinical summary was aggregated from multiple sources. Caution should be 
exercised in using it in the provision of clinical care. This summary normalizes
information from multiple sources, and as a consequence, information in this 
document may materially change the coding, format and clinical context of 
patient data. In addition, data may be omitted in some cases. CLINICAL DECISIONS
SHOULD BE BASED ON THE PRIMARY CLINICAL RECORDS. [employer] provides 
no warranty or guarantee of the accuracy or completeness of information in this 
document.

## 2024-10-25 ENCOUNTER — HOSPITAL ENCOUNTER (OUTPATIENT)
Age: 24
Discharge: HOME | End: 2024-10-25
Payer: COMMERCIAL

## 2024-10-25 VITALS — SYSTOLIC BLOOD PRESSURE: 151 MMHG | HEART RATE: 110 BPM | OXYGEN SATURATION: 100 % | DIASTOLIC BLOOD PRESSURE: 84 MMHG

## 2024-10-25 VITALS — HEART RATE: 109 BPM | DIASTOLIC BLOOD PRESSURE: 86 MMHG | OXYGEN SATURATION: 96 % | SYSTOLIC BLOOD PRESSURE: 143 MMHG

## 2024-10-25 VITALS
DIASTOLIC BLOOD PRESSURE: 85 MMHG | OXYGEN SATURATION: 99 % | SYSTOLIC BLOOD PRESSURE: 132 MMHG | HEART RATE: 119 BPM | TEMPERATURE: 96.6 F

## 2024-10-25 VITALS — BODY MASS INDEX: 57 KG/M2 | BODY MASS INDEX: 57.4 KG/M2

## 2024-10-25 VITALS
DIASTOLIC BLOOD PRESSURE: 82 MMHG | HEART RATE: 104 BPM | SYSTOLIC BLOOD PRESSURE: 145 MMHG | OXYGEN SATURATION: 97 % | TEMPERATURE: 97.8 F

## 2024-10-25 VITALS — OXYGEN SATURATION: 96 % | HEART RATE: 107 BPM | DIASTOLIC BLOOD PRESSURE: 89 MMHG | SYSTOLIC BLOOD PRESSURE: 150 MMHG

## 2024-10-25 VITALS
HEART RATE: 120 BPM | OXYGEN SATURATION: 95 % | TEMPERATURE: 97.88 F | SYSTOLIC BLOOD PRESSURE: 153 MMHG | DIASTOLIC BLOOD PRESSURE: 92 MMHG

## 2024-10-25 VITALS — OXYGEN SATURATION: 95 % | DIASTOLIC BLOOD PRESSURE: 79 MMHG | SYSTOLIC BLOOD PRESSURE: 153 MMHG

## 2024-10-25 VITALS — DIASTOLIC BLOOD PRESSURE: 85 MMHG | OXYGEN SATURATION: 98 % | SYSTOLIC BLOOD PRESSURE: 128 MMHG | HEART RATE: 106 BPM

## 2024-10-25 VITALS — SYSTOLIC BLOOD PRESSURE: 176 MMHG | OXYGEN SATURATION: 97 % | DIASTOLIC BLOOD PRESSURE: 91 MMHG

## 2024-10-25 DIAGNOSIS — D62: ICD-10-CM

## 2024-10-25 DIAGNOSIS — I10: ICD-10-CM

## 2024-10-25 DIAGNOSIS — E66.01: ICD-10-CM

## 2024-10-25 DIAGNOSIS — E28.2: ICD-10-CM

## 2024-10-25 DIAGNOSIS — F17.290: ICD-10-CM

## 2024-10-25 DIAGNOSIS — N93.8: Primary | ICD-10-CM

## 2024-10-25 DIAGNOSIS — N92.0: ICD-10-CM

## 2024-10-25 LAB
ADD MANUAL DIFF: NO
HCT VFR BLD CALC: 29.2 % (ref 36–48)
HEMATOCRIT: 29.2 % (ref 36–48)
HEMOGLOBIN: 9.2 G/DL (ref 12–16)
IMMATURE GRANULOCYTES ABS AUTO: 0.09 10^3/UL (ref 0–0.03)
IMMATURE GRANULOCYTES PCT AUTO: 0.7 % (ref 0–0.5)
INR: 0.98
LYMPHOCYTES  ABSOLUTE AUTO: 2.9 10^3/UL (ref 1.2–3.8)
MCV RBC: 80 FL (ref 81–99)
MEAN CORPUSCULAR HEMOGLOBIN: 25.2 PG (ref 26.7–34)
MEAN CORPUSCULAR HGB CONC: 31.5 G/DL (ref 29.9–35.2)
MEAN CORPUSCULAR VOLUME: 80 FL (ref 81–99)
PARTIAL THROMBOPLASTIN TIME: 23.9 SEC (ref 22.3–36.2)
PLATELET # BLD: 290 10^3/UL (ref 150–450)
PLATELET COUNT: 290 10^3/UL (ref 150–450)
PROTHROMBIN TIME: 10.4 SEC (ref 9–11.6)
RED BLOOD COUNT: 3.65 10^6/UL (ref 4.2–5.4)
WBC # BLD: 13.3 10^3/UL (ref 4–11)
WHITE BLOOD COUNT: 13.3 10^3/UL (ref 4–11)

## 2024-10-25 PROCEDURE — 86900 BLOOD TYPING SEROLOGIC ABO: CPT

## 2024-10-25 PROCEDURE — 85025 COMPLETE CBC W/AUTO DIFF WBC: CPT

## 2024-10-25 PROCEDURE — 85730 THROMBOPLASTIN TIME PARTIAL: CPT

## 2024-10-25 PROCEDURE — 88305 TISSUE EXAM BY PATHOLOGIST: CPT

## 2024-10-25 PROCEDURE — 84702 CHORIONIC GONADOTROPIN TEST: CPT

## 2024-10-25 PROCEDURE — 86850 RBC ANTIBODY SCREEN: CPT

## 2024-10-25 PROCEDURE — 86901 BLOOD TYPING SEROLOGIC RH(D): CPT

## 2024-10-25 PROCEDURE — 85610 PROTHROMBIN TIME: CPT

## 2024-10-25 PROCEDURE — 36415 COLL VENOUS BLD VENIPUNCTURE: CPT

## 2024-10-25 PROCEDURE — 58558 HYSTEROSCOPY BIOPSY: CPT

## 2024-10-25 NOTE — XMS_ITS
Comprehensive CCD (C-CDA v2.1)  
  
                          Created on: 2024  
  
  
ANETA LINNETTE SIMONE NICHOLS~ANETA  
External Reference #: CDR,PersonID:[id number]  
: 2000  
Sex: Female  
  
Demographics  
  
  
                                        Address             [address] [place] [address]  [address]  
   
                                        Home Phone          [phone]  
   
                                        Home Phone          [phone]  
   
                                        Home Phone          [phone]  
   
                                        Home Phone          [phone]  
   
                                        Preferred Language  en  
   
                                        Marital Status      Single  
   
                                        Mu-ism Affiliation Unknown  
   
                                        Race                Unknown  
   
                                        Ethnic Group        Not  or Lati  
no  
  
  
Author  
  
  
                                        Organization        [facility]  
  
  
Care Team Providers  
  
  
                                Care Team Member Name Role            Phone  
   
                                STANLEY NERI MD Unavailable     Unavailab  
le  
   
                                STANLEY NERI MD Unavailable     Unavailab  
nilda  
   
                                NONE            Unavailable     Unavailable  
   
                                ANYA OLIVARES Attending       Unavailable  
   
                                ANYA OLIVARES Consulting      Unavailable  
   
                                ANYA OLIVARES Admitting       Unavailable  
   
                                Deborah DIAS, Doreen Primary Care Provider [phone]  
   
                                Deborah APRN - LARISSA, Doreen M Primary Care Provider  
 [phone]  
   
                                MOLLY SALEH C Admitting       Unavailable  
   
                                MOLLY SALEH C Attending       Unavailable  
   
                                ISMAEL ORTEGA Referring       Unavailable  
   
                                DEBORAH, DOREEN M Primary Care    Unavailable  
   
                                MALIKA SHEPHERD Attending       Unavailable  
   
                                DEBORAH, DOREEN M Primary Care    Unavailable  
   
                                ISMAEL ORTEGA Attending       Unavailable  
   
                                DEBORAH, DOREEN M Primary Care    Unavailable  
   
                                RAFAEL GALLAGHER   Attending       Unavailable  
   
                                DEBORAH, DOREEN M Primary Care    Unavailable  
   
                                DEBORAH, DOREEN M Referring       Unavailable  
   
                                DEBORAH, DOREEN M Primary Care    Unavailable  
   
                                Deborah APRN - LARISSA, Doreen M Primary Care Provider  
 [phone]  
   
                                DEBORAH, DOREEN M Referring       Unavailable  
   
                                DEBORAH, DOREEN M Primary Care    Unavailable  
   
                                Deborah LARISSA, Doreen Primary Care Provider [phone]  
   
                                DEBORAH, DOREEN M Primary Care    Unavailable  
   
                                ABRAHAM MARIE Attending       Unavailable  
   
                                ABRAHAM MARIE Attending       Unavailable  
   
                                ABRAHAM MARIE Referring       Unavailable  
   
                                DEBORAH, DOREEN M Primary Care    Unavailable  
   
                                Deborah LARISSA, Doreen Unavailable     [phone]  
   
                                SYEDA PATTERSON    Attending       Unavailable  
  
  
  
Allergies  
  
  
                                                    Allergy   
Classification                          Reported   
Allergen(s)               Allergy Type              Date of   
Onset                     Reaction(s)               Facility  
   
                                                    Amoxicillin /   
Clavulanate  
(6 sources)                             Amoxicillin /   
Clavulanate;   
Translations:   
[Augmentin   
500-125 MG Oral   
Tablet]                   Drug Allergy                
1                         [facility]  
   
                                                    ARIPiprazole  
(6 sources)                             ARIPiprazole;   
Translations:   
[Abilify]                 Drug Allergy                
1                         [facility]                    [facility]  
   
                                                    metFORMIN  
(6 sources)                             metFORMIN;   
Translations:   
[metformin]               Drug Allergy                
1                                       Nausea,   
Vomiting                                Health   
[facility]  
   
                                                    Serotonin Reuptake   
Inhibitors (SSRIs)  
(7 sources)                             Sertraline;   
Translations:   
[trazodone   
hydrochloride]            Drug Allergy                
1                                       Panic attack,   
slow/groggy                             Health   
[facility]  
   
                                                    Unclassified  
(6 sources)                             Amoxicillin-Pot   
Clavulanate;   
Translations:   
[AMOXICILLIN-PO  
T CLAVULANATE]                          Propensity to   
adverse   
reactions to   
drug                                      
7                                                   [facility]  
   
                                                      
(1 source)                              Amoxicillin /   
Clavulanate               Drug Allergy                
4                                                   [facility]   
Repository  
   
                                                      
(20 sources)                            Amoxicillin /   
Clavulanate;   
Translations:   
[Augmentin   
500-125 MG Oral   
Tablet]                   Drug Allergy                
1                         [facility]  
   
                                                      
(20 sources)                            ARIPiprazole;   
Translations:   
[Abilify]                 Drug Allergy                
1                         [facility]  
   
                                                      
(20 sources)                            metFORMIN;   
Translations:   
[METFORMIN]               Drug Allergy                
0                                       Nausea,   
Vomiting                                [facility]   
[facility]  
   
                                                      
(15 sources)        Sertraline          Drug Allergy          
1                         slow/ [facility]              [facility]  
   
                                                      
(4 sources)         traZODone           Drug Allergy        10-  
2                         Panic attack              [facility]  
   
                                                      
(1 source)                              nickel;   
Translations:   
[NICKEL]                  Drug Allergy                
7                                                   [facility]   
Repository  
   
                                                      
(1 source)                              CARBONIC   
ANHYDRASE   
INHIBITORS;   
Translations:   
[CARBONIC   
ANHYDRASE   
INHIBITORS]                             Propensity to   
adverse   
reactions to   
drug   
(disorder)                                
7                                                   [facility]   
Repository  
   
                                                      
(8 sources)         Sertraline          Drug Allergy          
4                         slow/groggy               [facility]  
   
                                                      
(8 sources)         traZODone           Drug Allergy          
4                         Panic attack              [facility]  
  
  
  
Medications  
Current Medications  
  
  
  
                      Medication Drug Class(es) Dates      Sig (Normalized) Sig   
(Original)  
   
                                                    Alcohol Pads 70%  
(7 sources)                                         Start:   
2024                                          Alcohol Pads 70%   
2024   
Provider: Doreen Cabrera CNP  
   
                                                    ARIPiprazole 5 mg   
oral tablet  
(20 sources)                            Atypical   
Antipsychotic                           Start:   
10-  
End:   
2024                                          ARIPiprazole 5 MG   
Oral Tablet   
2024   
Provider: Doreen Cabrera CNP  
   
                                                    atorvastatin 20 mg   
oral tablet  
(7 sources)                             HMG-CoA Reductase   
Inhibitor                               Start:   
2024                                          Atorvastatin   
Calcium 20 MG Oral   
Tablet 2024   
Provider: Doreen Cabrera CNP  
   
                                                    Blood Glucose System   
Sriram Kit  
(7 sources)                                         Start:   
2024                                          Blood Glucose   
System Sriram Kit   
2024   
Provider: Doreen Cabrera CNP  
   
                                                    busPIRone   
hydrochloride 10 mg   
oral tablet  
(20 sources)                                        Start:   
2024                                          busPIRone HCl 10   
MG Oral Tablet   
2024   
Provider: Doreen Cabrera CNP  
  
  
  
                                                    Start: 2023  
End: 2024                                     busPIRone HCl 5 MG Oral Tabl  
et 2024 - 2024   
Provider:   
Doreen Cabrera CNP  
  
  
  
                                                    docusate sodium 100   
mg oral capsule  
(5 sources)                     Start: 2024                 Colace 100 MG   
Oral   
Capsule 2024   
Provider: Doreen Cabrera CNP  
   
                                                    24 hr guanFACINE 1 mg   
extended release oral   
tablet  
(17 sources)                            Central alpha-2   
Adrenergic Agonist                      Start: 2024  
End: 2024                                     Intuniv 1 MG Oral   
Tablet Extended   
Release 24 Hour   
2024 Provider:   
Doreen Cabrera CNP  
   
                                                    hydrOXYzine pamoate   
50 mg oral capsule  
(2 sources)     Antihistamine   Start: 2021                 hydrOXYzine   
(VISTARIL) capsule 50   
mg  
  
  
  
                                                take 1 tablet by mouth once verito  
y hydrOXYzine (ATARAX) 10 MG tablet Take 10 mg   
by   
mouth nightly 0 Active  
  
  
  
                                                    ibuprofen 400 mg   
oral tablet  
(2 sources)                             Nonsteroidal   
Anti-inflammatory Drug                  Start:   
2021                              take 1 tablet   
by mouth   
every six   
hours as   
needed for   
pain                                    ibuprofen   
(ADVIL;MOTRIN) 400   
MG tablet Take 1   
tablet by mouth   
every 6 hours as   
needed for Pain   
120 tablet 0   
2021 Active  
   
                                                    Iron  
(5 sources)                                         Start:   
2024                                          [employer] Iron 325 (65   
Fe) MG Oral Tablet   
2024   
Provider: Doreen Cabrera CNP  
   
                                                    lisinopril 5 mg   
oral tablet  
(7 sources)                             Angiotensin Converting   
Enzyme Inhibitor                        Start:   
2024                                          Lisinopril 5 MG   
Oral Tablet   
2024   
Provider: Doreen Cabrera CNP  
   
                                                    LORazepam 0.5 mg   
oral tablet  
(3 sources)         Benzodiazepine                          take 0.5 mg   
by mouth once   
daily as   
needed                                  LORazepam (ATIVAN   
PO) Take 0.5 mg by   
mouth daily as   
needed 0 Active  
   
                                                    meclizine   
hydrochloride 25   
mg oral tablet  
(3 sources)               Antiemetic                Start:   
2017                              take 1 tablet   
by mouth   
three times   
daily as   
needed for   
dizziness                               meclizine   
(ANTIVERT) 25 MG   
tablet Take 1   
tablet by mouth 3   
times daily as   
needed for   
Dizziness 30   
tablet 0   
2017 Active  
   
                                                    naloxone   
hydrochloride 40   
mg/ml nasal spray  
(20 sources)              Opioid Antagonist         Start:   
2021  
End:   
2021                                          Narcan 4 MG/0.1ML   
Nasal Liquid   
2021   
Provider: Doreen Cabrera CNP  
   
                                                    Naproxen  
(3 sources)                             Nonsteroidal   
Anti-inflammatory Drug                                         Naproxen Sodium   
(ALEVE PO) Take 1   
tablet by mouth as   
needed 0 Active  
   
                                                    polyethylene   
glycol 3350 [id number]   
mg powder for oral   
solution  
(20 sources)              Osmotic Laxative          Start:   
2021                                          MiraLax 17   
GM/SCOOP Oral   
Powder 2021   
Provider: Doreen Cabrera CNP  
   
                                                    QUEtiapine 50 mg   
oral tablet  
(5 sources)               Atypical Antipsychotic    Start:   
2024                                          SEROquel 50 MG   
Oral Tablet   
2024   
Provider: Doreen Cabrera CNP  
   
                                                    SITagliptin 50 mg   
oral tablet  
(10 sources)                            Dipeptidyl Peptidase 4   
Inhibitor                               Start:   
2024                                          Januvia 50 MG Oral   
Tablet 2024   
Provider: Doreen Cabrera CNP  
  
  
  
                                                take 500 mg by mouth once daily   
SITagliptin Phosphate (JANUVIA PO) Take 500 mg   
by   
mouth daily 0 Active  
  
  
  
                                                    topiramate 25 mg oral   
tablet  
(3 sources)                                                 take 25 mg by mouth   
twice daily                             Topiramate (TOPAMAX PO)   
Take 25 mg by mouth 2   
times daily 0 Active  
   
                                                    {24 (drospirenone 4 MG   
Oral Tablet) / 4 (inert   
ingredients 1 MG Oral   
Tablet) } Pack [Slynd]  
(7 sources)                                         Start:   
2024                                          Slynd 4 MG Oral Tablet   
2024 Provider:   
Doreen Cabrera CNP  
  
  
  
Completed/Discontinued Medications  
  
  
  
                      Medication Drug Class(es) Dates      Sig (Normalized) Sig   
(Original)  
   
                                                    ALPRAZolam 0.5 mg oral   
tablet  
(1 source)                Benzodiazepine            Start:   
2021  
End:   
2021                                          ALPRAZolam   
(XANAX) tablet   
0.5 mg  
   
                                                    azithromycin 250 mg oral   
tablet  
(20 sources)                            Macrolide   
Antimicrobial                           Start:   
2021  
End:   
2021                                          Azithromycin 250   
MG Oral Tablet   
2021 -   
2021   
Provider:  
   
                                                    brompheniramine maleate   
0.4 mg/ml /   
dextromethorphan   
hydrobromide 2 mg/ml /   
pseudoephedrine   
hydrochloride 6 mg/ml   
oral solution  
(20 sources)                            alpha-Adrenergic   
Agonist,   
Uncompetitive   
N-methyl-D-aspartat  
e Receptor   
Antagonist, Sigma-1   
Agonist                                 Start:   
2021  
End:   
2021                                          Pseudoeph-Bromph  
en-DM 30-2-10   
MG/5ML Oral   
Syrup 2021   
- 2021   
Provider:  
   
                                                    escitalopram 5 mg oral   
tablet  
(20 sources)                            Serotonin Reuptake   
Inhibitor                               Start:   
2021  
End:   
10-                                          Lexapro 5 MG   
Oral Tablet   
2021 -   
2021   
Provider:  
   
                                                    hydroCHLOROthiazide 25   
mg / spironolactone 25   
mg oral tablet  
(20 sources)                            Thiazide Diuretic,   
Aldosterone   
Antagonist                              Start:   
2023  
End:   
2023                                          Spironolactone-H  
CTZ 25-25 MG   
Oral Tablet   
2023 -   
2023   
Provider: Doreen Cabrera CNP  
  
  
  
                                                    Start: 2021  
End: 2023                                     Aldactazide 50-50 MG Oral Ta  
blet 06/10/2021 - 2021   
Provider: Doreen Cabrera CNP  
  
  
  
                                                    hydrocortisone 10   
mg/ml / neomycin 3.5   
mg/ml / polymyxin b   
32927 unt/ml otic   
suspension  
(20 sources)                            Aminoglycoside   
Antibacterial,   
Polymyxin-class   
Antibacterial,   
Corticosteroid                          Start:   
2021  
End: 2021                                     Neomycin-Polymyxin-HC   
3.5-25576-1 Otic   
Suspension 2021 -   
2021 Provider: Doreen Cabrera CNP  
   
                                                    ketorolac   
tromethamine 10 mg   
oral tablet  
(20 sources)                            Nonsteroidal   
Anti-inflammatory   
Drug, Cyclooxygenase   
Inhibitor                               Start:   
2022  
End: 2022                                     Ketorolac Tromethamine 10   
MG Oral Tablet 2022   
- 2022 Provider:   
Julieth Pisano CNP  
   
                                                    lamoTRIgine 100 mg   
oral tablet  
(20 sources)                            Mood Stabilizer,   
Anti-epileptic Agent                    Start:   
2024  
End: 2024                                     lamoTRIgine 100 MG Oral   
Tablet 2024 -   
2024 Provider: Doreen Cabrera CNP  
   
                                                    metFORMIN   
hydrochloride 500 mg   
oral tablet  
(20 sources)              Biguanide                 Start:   
07-  
End: 2024                                     metFORMIN HCl 500 MG Oral   
Tablet 07/15/2023 -   
2024 Provider:  
  
  
  
                                                    Start: 2021  
End: 10-                         take 1 tablet by mouth every   
twenty-four hours                       metFORMIN HCl  MG Oral   
Tablet Extended Release 24 Hour   
2021 - 10/21/2022 Provider:   
Doreen Cabrera CNP  
  
  
  
                                                    methylPREDNISolone 4 mg   
oral tablet  
(20 sources)              Corticosteroid            Start:   
2021  
End: 2021                                     methylPREDNISolone 4 MG   
Oral Tablet Therapy   
Pack 2021 -   
2021 Provider:  
   
                                                    prazosin 1 mg oral   
capsule  
(20 sources)                            alpha-Adrenergic   
Blocker                                 Start:   
2023  
End: 2024                                     Prazosin HCl 1 MG Oral   
Capsule 2024 -   
2024 Provider:   
Doreen Cabrera CNP  
  
  
  
                                                    Start: 2022  
End: 2023                                     Prazosin HCl 1 MG Oral Capsu  
le   
2022 - 2023 Provider:   
Doreen Cabrera CNP  
   
                                        Start: 2021   take 3 capsules by m  
outh once   
daily                                   prazosin (MINIPRESS) 1 MG capsule   
Take 3 capsules by mouth nightly   
90 capsule 0 2021 Active  
   
                                                    Start: 2021  
End: 10-                                     Prazosin HCl 1 MG Oral Capsu  
le   
06/10/2021 - 2021 Provider:   
Doreen Cabrera CNP  
  
  
  
                                                    promethazine   
hydrochloride 12.5 mg   
oral tablet  
(20 sources)              Phenothiazine             Start: 2022  
End: 2023                                     Promethazine HCl 12.5   
MG Oral Tablet   
2022 -   
2023 Provider:   
Julieth Pisano CNP  
   
                                                    sertraline 50 mg oral   
tablet  
(20 sources)                            Serotonin Reuptake   
Inhibitor                               Start: 2024  
End: 2024                                     Sertraline HCl 50 MG   
Oral Tablet   
2024 -   
2024 Provider:   
Doreen Cabrera CNP  
  
  
  
                                                    Start: 2021  
End: 2021                                     Zoloft 50 MG Oral Tablet 2021 - 2021 Provider:  
  
  
  
                                                    traZODone hydrochloride   
100 mg oral tablet  
(20 sources)                            Serotonin Reuptake   
Inhibitor                               Start: 2023  
End: 2024                                     traZODone HCl 100 MG   
Oral Tablet   
2024 -   
2024 Provider:   
Doreen Cabrera CNP  
  
  
  
                                                    Start: 2021  
End: 10-                                     traZODone HCl 50 MG Oral Tab  
let 2021 - 2021   
Provider:  
  
  
  
                                                    ziprasidone 80 mg oral   
capsule  
(20 sources)              Atypical Antipsychotic    Start: 2021  
End: 10-                                     Geodon 80 MG Oral   
Capsule 2021 -   
2021 Provider:  
  
  
  
                                                    Start: 2021  
End: 2021                                     Geodon 20 MG Oral Capsule   
2021 - 2021 Provider:  
   
                                                            take 4 capsules by m  
outh once   
daily                                   ziprasidone (GEODON) 20 MG capsule   
Take 80 mg by mouth nightly 0   
Active  
  
  
  
Problems  
Active Problems  
  
  
                                                    Problem   
Classification  Problem         Date            Documented Date Episodic/Chronic  
   
                                                    Anxiety disorders  
(20 sources)                            Posttraumatic stress   
disorder;   
Translations:   
[Post-traumatic stress   
disorder, unspecified]                  Onset:   
2021                                          Chronic  
   
                                                    Attention-deficit,   
conduct, and   
disruptive behavior   
disorders  
(20 sources)                            Attention deficit   
hyperactivity   
disorder;   
Translations:   
[Attention-deficit   
hyperactivity   
disorder, unspecified   
type]                                   Onset:   
2024                Chronic  
   
                                                    Attention-deficit,   
conduct, and   
disruptive behavior   
disorders  
(12 sources)                            Undifferentiated   
attention deficit   
disorder;   
Translations:   
[Attention deficit   
disorder with   
hyperactivity]                          Onset:   
2024                                          Chronic  
   
                                                    Deficiency and other   
anemia  
(3 sources)                             Iron deficiency   
anemia; Translations:   
[Iron deficiency   
anemia, unspecified]                    Onset:   
10-                                          Episodic  
   
                                                    Diabetes mellitus   
without complication  
(19 sources)                            Type 2 diabetes   
mellitus without   
complication;   
Translations: [Type 2   
diabetes mellitus   
without complications]                  Onset:   
2024                Chronic  
   
                                                    Disorders of teeth   
and jaw  
(5 sources)                             Dental caries;   
Translations: [Dental   
caries, unspecified]                    Onset:   
2024                                          Episodic  
   
                                                    Essential   
hypertension  
(16 sources)                            Hypertensive disorder;   
Translations:   
[Unspecified essential   
hypertension]                           Onset:   
2021                                          Chronic  
   
                                                    Immunizations and   
screening for   
infectious disease  
(20 sources)                            Contact with and   
(suspected) exposure   
to other viral   
communicable diseases;   
Translations: [HIV   
screening]                              Onset:   
08-                                          Episodic  
   
                                                    Impulse control   
disorders, NEC  
(1 source)                              Homicidal thoughts;   
Translations:   
[Homicidal ideations]                                         Episodic  
   
                                                    Menstrual disorders  
(14 sources)                            Dysmenorrhea;   
Translations:   
[Dysmenorrhea,   
unspecified]                            Onset:   
2024                Chronic  
   
                                                    Mood disorders  
(20 sources)                            Depressive disorder;   
Translations: [Major   
depressive disorder,   
single episode,   
unspecified]                            Onset:   
2021                                          Chronic  
   
                                                    Nonspecific chest   
pain  
(3 sources)                             Other chest pain;   
Translations: [Chest   
pain]                                   Onset:   
2024                                          Episodic  
   
                                                    Other ear and sense   
organ disorders  
(7 sources)                             Otitis externa;   
Translations:   
[Infective otitis   
externa, unspecified]                   Onset:   
2021                                          Chronic  
   
                                                    Other lower   
respiratory disease  
(6 sources)                             Cough; Translations:   
[Cough]                                 Onset:   
2022                                          Episodic  
   
                                                    Other nutritional;   
endocrine; and   
metabolic disorders  
(20 sources)                            Finding of body mass   
index; Translations:   
[Body mass index   
(observable entity)]                    Onset:   
2021                                          Chronic  
   
                                                    Other nutritional;   
endocrine; and   
metabolic disorders  
(20 sources)                            Morbid obesity;   
Translations: [Morbid   
obesity]                                Onset:   
2021                                          Chronic  
   
                                                    Personality   
disorders  
(20 sources)                            Borderline personality   
disorder;   
Translations:   
[Borderline   
personality disorder]                   Onset:   
2021  
Resolved:   
2024                                          Chronic  
   
                                                    Substance-related   
disorders  
(20 sources)                            Tobacco dependence   
syndrome;   
Translations:   
[Nicotine dependence,   
unspecified,   
uncomplicated]                          Onset:   
2021  
Resolved:   
2023                                          Chronic  
  
  
Past or Other Problems  
  
  
                      Problem Classification Problem    Date       Documented Da  
te Episodic/Chronic  
   
                                                    Cardiac dysrhythmias  
(10 sources)                            Tachycardia;   
Translations:   
[Tachycardia,   
unspecified]                            Onset:   
2021                                          Episodic  
   
                                                    Conditions associated   
with dizziness or   
vertigo  
(5 sources)                             Dizziness;   
Translations:   
[Dizziness and   
giddiness]                              Onset:   
2017                Episodic  
   
                                                    Deficiency and other   
anemia  
(9 sources)                             Anemia;   
Translations:   
[Anemia,   
unspecified]                            Onset:   
2024                                          Episodic  
   
                                                    Other screening for   
suspected conditions   
(not mental disorders   
or infectious disease)  
(20 sources)                            Encounter for   
screening for   
diabetes mellitus;   
Translations:   
[Diabetes Risk Test   
Score]                                  Onset:   
2021                                          Episodic  
   
                                                    Otitis media and   
related conditions  
(5 sources)                             Acute suppurative   
otitis media   
without spontaneous   
rupture of ear   
drum; Translations:   
[Acute suppurative   
otitis media   
without spontaneous   
rupture of ear   
drum, right ear]                        Onset:   
2017                Episodic  
  
  
  
Results  
  
  
                          Test Name    Value        Interpretation Reference   
Range                                   Facility  
   
                                                    Laboratory - Chemistry and C  
hemistry - challengeon 2024   
   
                      Ferritin [Mass/Vol] 118 ng/mL             ( )  [facility]  
[facility]  
   
                      Free T3 [Mass/Vol] 3.2 pg/mL             (2.0-4.4 ) [facility]  
   
                          Free T4 [Mass/Vol] 1.13 ng/dL                (0.82-1.7  
7   
)                                       [facility]  
   
                      Iron [Mass/Vol] 52 ug/dL              ( )  [facility]  
   
                                                    Iron binding capacity   
[Mass/Vol]      396 ug/dL                       (250-450 )      [facility]  
   
                                                    Iron binding   
capacity.unsaturated   
[Mass/Vol]      344 ug/dL                       (131-425 )      [facility]  
   
                                                    Iron saturation [Mass   
fraction]       13 %            Low             (15-55 )        [facility]  
   
                          TSH Qn       2.930 uIU/mL              (0.450-4.50  
0 )                                     [facility]  
   
                                                    Laboratory - Hematology and   
Cell countson 2024   
   
                      Basophils (Bld) [#/Vol] 0.1 10*3/uL            (0.0-0.2 )   
[facility]  
   
                          Basophils/100 WBC (Bld) 0 %                       (Not  
 Estab.   
)                                       [facility]  
   
                                                    Eosinophils (Bld)   
[#/Vol]         0.2 10*3/uL                     (0.0-0.4 )      [facility]  
   
                                                    Eosinophils/100 WBC   
(Bld)               1 %                                     (Not Estab.   
)                                       [facility]  
   
                                                    Erythrocyte   
distribution width   
(RBC) [Ratio]       15.7 %              High                (11.7-15.4   
)                                       [facility]   
[facility]   
[facility]  
   
                                                    Hematocrit (Bld)   
[Volume fraction]   41.3 %                                  (34.0-46.6   
)                                       [facility]   
[facility]   
[facility]  
   
                                                    Hemoglobin (Bld)   
[Mass/Vol]          12.9 g/dL                               (11.1-15.9   
)                                       [facility]   
[facility]   
[facility]  
   
                                                    Immature granulocytes   
(Bld) [#/Vol]   0.1 10*3/uL                     (0.0-0.1 )      [facility]   
[facility]   
[facility]  
   
                                                    Immature   
granulocytes/100 WBC   
(Bld)               1 %                                     (Not Estab.   
)                                       [facility]   
[facility]  
   
                                                    Lymphocytes (Bld)   
[#/Vol]         2.4 10*3/uL                     (0.7-3.1 )      [facility]   
[facility]   
[facility]  
   
                                                    Lymphocytes/100 WBC   
(Bld)               18 %                                    (Not Estab.   
)                                       [facility]   
[facility]   
[facility]  
   
                                                    MCH (RBC) [Entitic   
mass]               24.3 pg             Low                 (26.6-33.0   
)                                       [facility]   
[facility]   
[facility]  
   
                          MCHC (RBC) [Mass/Vol] 31.2 g/dL    Low          (31.5-  
35.7   
)                                       [facility]   
[facility]   
[facility]  
   
                      MCV (RBC) [Entitic vol] 78 fL      Low        (79-97 )   H  
ealt   
[facility]   
[facility]  
   
                      Monocytes (Bld) [#/Vol] 0.6 10*3/uL            (0.1-0.9 )   
[facility]   
[facility]   
[facility]  
   
                          Monocytes/100 WBC (Bld) 5 %                       (Not  
 Estab.   
)                                       [facility]   
[facility]  
   
                                                    Morphology Gagandeep (Bld)   
[Interp]        NP                                              [facility]   
[facility]  
   
                                                    Neutrophils (Bld)   
[#/Vol]         9.7 10*3/uL     High            (1.4-7.0 )      [facility]   
[facility]   
[facility]  
   
                                                    Neutrophils/100 WBC   
(Bld)               75 %                                    (Not Estab.   
)                                       [facility]   
[facility]   
[facility]  
   
                                                    Nucleated RBC/100 WBC   
(Bld) [Ratio]   NP                                              [facility]   
[facility]   
[facility]  
   
                      Platelets (Bld) [#/Vol] 308 10*3/uL            (150-450 )   
[facility]   
[facility]   
[facility]  
   
                          RBC (Bld) [#/Vol] 5.31 10*6/uL High         (3.77-5.28  
   
)                                       [facility]  
   
                      WBC (Bld) [#/Vol] 13.1 10*3/uL High       (3.4-10.8 ) [facility]  
[facility]   
[facility]   
[place]  
   
                                                    Laboratory - Serology - non-  
microon 2024   
   
                      Thyroglobulin Ab Qn [IU]/mL               (0.0-0.9 ) [facility]  
   
                                        Comment on above:   Note: Thyroglobulin   
Antibody measured by [facility]   
[facility] .It should be noted that the presence of   
thyroglobulinantibodies may not be pathogenic nor diagnostic,   
especiallyat very low levels. The assay  has found   
thatfour percent of individuals without evidence of   
thyroiddisease or autoimmunity will have positive TgAb levels   
upto 4 IU/mL.   
   
                      TPO Ab Qn  14 [IU]/mL            (0-34 )    [facility]  
   
                                                    No Panel Informationon    
   
                      Immature Cells NP                               [facility]  
   
                      Reported Physicians See Note                         [facility]  
   
                                        Comment on above:   Note: Reported Physi  
cians:Ordering: Doreen Cabrera   
   
                                                    Troponin I.cardiac High sens  
itivity method [Mass/Vol]on 2024   
   
                                                    1 HOUR TROP I, HIGH   
SENSITIVITY     <2              Normal          <16             [facility]  
   
                                        Comment on above:   Performed By: #### 8  
9579-7 ####  
[facility] ([id number])  
[address] [place] [address]   
   
                                                    BASIC METABOLIC PANLon    
   
                      Anion gap [Moles/Vol] 7 mmol/L   Normal     5-15       [facility]  
   
                                        Comment on above:   Performed By: #### C  
BCA, BMP, 81403-7, 27194-4, 56972-1 ####  
[facility] ([id number])  
[address] [place] [address]   
   
                      Calcium [Mass/Vol] 8.9 mg/dL  Normal     8.5-10.5   [facility]  
   
                                        Comment on above:   Performed By: #### C  
BCA, BMP, 64667-8, 78404-0, 95454-7 ####  
[facility] ([id number])  
[address] [place] [address]   
   
                      Chloride [Moles/Vol] 101 mmol/L Normal          [facility]  
   
                                        Comment on above:   Performed By: #### C  
BCA, BMP, 93814-9, 28004-0, 90019-0 ####  
[facility] ([id number])  
[address] [place] [address]   
   
                      CO2 [Moles/Vol] 28 mmol/L  Normal     22-32      [facility]  
   
                                        Comment on above:   Performed By: #### C  
BCA, BMP, 06873-0, 80916-0, 32240-7 ####  
[facility] ([id number])  
[address] [place] [address]   
   
                      Creatinine [Mass/Vol] 0.91 mg/dL Normal     0.40-1.00  [facility]  
   
                                        Comment on above:   Result Comment: METH  
OD TRACEABLE TO IDMS STANDARD   
   
                                                            Performed By: #### C  
BCA, BMP, 33645-1, 07807-3, 57425-0 ####  
[facility] ([id number])  
[address] [place] [address]   
   
                                                    eGFR (CKD-EPI) NON-RACE   
DEPENDENT       >90             Normal          >59             [facility]  
   
                                        Comment on above:   Result Comment:  
Reported eGFR is based on the  
CKD-EPI  equation that does  
not use a race coefficient.   
   
                                                            Performed By: #### C  
BCA, BMP, 36343-1, 51253-3, 41795-1 ####  
[facility] ([id number])  
[address] [place] [address]   
   
                      Glucose [Mass/Vol] 107 mg/dL  High       65-99      [facility]  
   
                                        Comment on above:   Performed By: #### C  
BCA, BMP, 75579-5, 78326-3, 99990-8 ####  
[facility] ([id number])  
[address] [place] [address]   
   
                      Potassium [Moles/Vol] 3.5 mmol/L Normal     3.5-5.0    [facility]  
   
                                        Comment on above:   Performed By: #### C  
BCA, BMP, 56441-2, 94019-2, 63335-2 ####  
[facility] ([id number])  
[address] [place] [address]   
   
                      Sodium [Moles/Vol] 136 mmol/L Normal     134-146    [facility]  
   
                                        Comment on above:   Performed By: #### C  
REBECCA, BMP, , 34481-7, 32229-4 ####  
[facility] ([id number])  
[address] [place] [address]   
   
                                                    Urea nitrogen   
[Mass/Vol]      10 mg/dL        Normal          5-23            [facility]  
   
                                        Comment on above:   Performed By: #### C  
REBECCA, BMP, , 93265-7, 21260-4 ####  
[facility] ([id number])  
[address] [place] [address]   
   
                                                    CBC AND AUTO DIFFon 20  
24   
   
                      ABSOLUTE BASOPHIL 0.2 X10E9/L Normal     0.0-0.2    [facility]  
   
                                        Comment on above:   Performed By: #### C  
REBECCA, BMP, , 14628-5, 93750-8 ####  
[facility] ([id number])  
[address] [place] [address]   
   
                      ABSOLUTE NEUTROPHIL 10.7 X10E9/L High       1.5-6.6    [facility]  
   
                                        Comment on above:   Performed By: #### C  
REBECCA, BMP, , 72166-7, 93383-0 ####  
[facility] ([id number])  
[address] [place] [address]   
   
                      Basophils/100 WBC (Bld) 1.1 %      Normal                [facility]  
   
                                        Comment on above:   Performed By: #### C  
REBECCA, BMP, , 16868-2, 67755-9 ####  
[facility] ([id number])  
[address] [place] [address]   
   
                                                    Eosinophils (Bld)   
[#/Vol]         0.2 10*3/uL     Normal          0.0-0.4         [facility]  
   
                                        Comment on above:   Performed By: #### C  
BCA, BMP, 58337-1, 26248-0, 01322-0 ####  
[facility] ([id number])  
[address] [place] [address]   
   
                                                    Eosinophils/100 WBC   
(Bld)           1.3 %           Normal                          [facility]  
   
                                        Comment on above:   Performed By: #### C  
BCA, BMP, , 69716-3, 55406-5 ####  
[facility] ([id number])  
[address] [place] [address]   
   
                                                    Erythrocyte   
distribution width   
(RBC) [Ratio]   19.4 %          High            11.5-15.0       [facility]  
   
                                        Comment on above:   Performed By: #### C  
BCA, BMP, , 46798-9, 00125-2 ####  
[facility] ([id number])  
[address] [place] [address]   
   
                                                    Hematocrit (Bld)   
[Volume fraction] 33.5 %          Low             35-47           [facility]  
   
                                        Comment on above:   Performed By: #### C  
BCA, BMP, , 09110-3, 47163-8 ####  
[facility] ([id number])  
[address] [place] [address]   
   
                                                    Hemoglobin (Bld)   
[Mass/Vol]      10.6 g/dL       Low             11.7-15.5       [facility]  
   
                                        Comment on above:   Performed By: #### C  
BCA, BMP, , 59995-3, 16295-1 ####  
[facility] ([id number])  
[address] [place] [address]   
   
                                                    Lymphocytes (Bld)   
[#/Vol]         2.4 10*3/uL     Normal          1.0-3.5         [facility]  
   
                                        Comment on above:   Performed By: #### C  
BCA, BMP, , 95409-8, 65237-6 ####  
[facility] ([id number])  
[address] [place] [address]   
   
                                                    Lymphocytes/100 WBC   
(Bld)           17.2 %          Normal                          [facility]  
   
                                        Comment on above:   Performed By: #### C  
BCA, BMP, 57903-3, 80252-0, 93246-4 ####  
[facility] ([id number])  
[address] [place] [address]   
   
                                                    MCH (RBC) [Entitic   
mass]           21.7 pg         Low             27-34           [facility]  
   
                                        Comment on above:   Performed By: #### C  
REBECCA, BMP, 63820-2, 06300-3, 32224-2 ####  
[facility] ([id number])  
[address] [place] [address]   
   
                      MCHC (RBC) [Mass/Vol] 31.6 g/dL  Low        32-36      [facility]  
   
                                        Comment on above:   Performed By: #### C  
REBECCA, BMP, , 23608-4, 50082-7 ####  
[facility] ([id number])  
[address] [place] [address]   
   
                      MCV (RBC) [Entitic vol] 69 fL      Low             P  
[facility]  
   
                                        Comment on above:   Performed By: #### DAVID FERNANDEZ, BMP, , 15213-4, 69855-6 ####  
[facility] ([id number])  
[address] [place] [address]   
   
                      Monocytes (Bld) [#/Vol] 0.7 10*3/uL Normal     0-0.9        
[facility]  
   
                                        Comment on above:   Performed By: #### DAVID FERNANDEZ, BMP, 30093-9, 32387-9, 90456-7 ####  
[facility] ([id number])  
[address] [place] [address]   
   
                      Monocytes/100 WBC (Bld) 4.8 %      Normal                P  
[facility]  
   
                                        Comment on above:   Performed By: #### C  
BCA, BMP, 85471-2, 30350-1, 02520-3 ####  
[facility] ([id number])  
[address] [place] [address]   
   
                                                    Neutrophils/100 WBC   
(Bld)           75.6 %          Normal                          [facility]  
   
                                        Comment on above:   Performed By: #### DAVID  
BCA, BMP, 09690-7, 78502-6, 26102-0 ####  
[facility] ([id number])  
[address] [place] [address]   
   
                                                    Platelet mean volume   
(Bld) [Entitic vol] 6.6 fL          Low             7-12            [facility]  
   
                                        Comment on above:   Performed By: #### DAVID  
BCA, BMP, 94715-9, 56969-0, 20201-9 ####  
[facility] ([id number])  
[address] [place] [address]   
   
                      Platelets (Bld) [#/Vol] 333 10*3/uL Normal     150-450      
[facility]  
   
                                        Comment on above:   Performed By: #### DAVID FERNANDEZ, BMP, 73932-4, 84839-7, 38537-9 ####  
[facility] ([id number])  
[address] [place] [address]   
   
                      RBC COUNT  4.88 X10E12/L Normal     3.80-5.20  [facility]  
   
                                        Comment on above:   Performed By: #### DAVID  
BCA, BMP, 84397-0, 63685-9, 11279-0 ####  
[facility] ([id number])  
[address] [place] [address]   
   
                                                    RBC morphology finding   
Nom (Bld)       REVIEWED        Normal                          [facility]  
   
                                        Comment on above:   Performed By: #### DAVID  
BCA, BMP, 38759-6, 64393-4, 29567-8 ####  
[facility] ([id number])  
[address] [place] [address]   
   
                      WBC (Bld) [#/Vol] 14.1 10*3/uL High       4.0-11.0   [facility]  
   
                                        Comment on above:   Performed By: #### C  
BISHOP FERNANDEZ, , 99521-1, 43044-3 ####  
[facility] ([id number])  
[address] [place] [address]   
   
                                                    Fibrin D-dimer DDU (PPP) [Ma  
ss/Vol]on 2024   
   
                      D DIMER    <150       Normal     <255       [facility]  
   
                                        Comment on above:   Result Comment:  
Results <255 ng/mL DDU: The presence of a  
VTE can safely be excluded with a negative  
D-Dimer result and Wells score. A negative  
result doesn't exclude the possibility of DIC.  
The test be repeated along with other  
diagnostic tests if the patient's symptoms  
persist or worsen.  
https://[email or web address].com.com/dv/dl.[email or web address]=[email or web address]  
&uh=acaea   
   
                                                            Performed By: #### C  
BISHOP FERNANDEZ, , 74664-1, 50254-5 ####  
[facility] ([id number])  
[address] [place] [address]   
   
                                                    MAGNESIUMon 2024   
   
                      Magnesium [Mass/Vol] 1.8 mg/dL  Normal     1.8-2.6    [facility]  
   
                                        Comment on above:   Performed By: #### C  
BISHOP FERNANEDZ, , 31093-6, 23890-9 ####  
[facility] ([id number])  
[address] [place] [address]   
   
                                                    Troponin I.cardiac High sens  
itivity method [Mass/Vol]on 2024   
   
                                                    TROPONIN I, HIGH   
SENSITIVITY     <2              Normal          <16             [facility]  
   
                                        Comment on above:   Performed By: #### C  
BISHOP FERNANDEZ, , 05386-0, 35357-3 ####  
[facility] ([id number])  
[address] [place] [address]   
   
                                                    XR CHEST 2 VWSon 2024   
   
                                        XR CHEST 2 VWS      XR CHEST 2 VWS  
XR CHEST 2 VWS  
History: . chest   
discomfort.  
Comparison: 2020  
Impression:  
No acute pulmonary   
process. No pneumothorax   
or pleural effusion.  
Nonenlarged heart.  
Workstation:[id number]  
Finalized by Dejon Khan MD on 2024   
9:49 PM             Normal                                  [facility]  
   
                                                    Chlamydia/GC DNA, Uron 10-24  
-2022   
   
                      Chlamydia Probe, Ur Negative   Normal     NEG        [facility]  
   
                                        Comment on above:   Result Comment: CHLA  
MYDIA TRACHOMATIS DNA not detected by   
nucleic acid amplification.  
This test is intended for medical purposes only and is not   
valid for the  
evaluation of suspected sexual abuse or for other forensic   
purposes.  
In certain contexts, culture may be required to meet   
applicable laws and  
regulations for diagnosis of C. trachomatis and N. gonorrhoeae   
infections.  
Per 2014 CDC recommendations, this test does not include   
confirmation of  
positive results by an alternative nucleic acid target.   
   
                                                            Performed By: #### T  
RCMOL, UCGP ####  
[facility]  
[address] [place], [place] [address] [phone]  
: Carson Santizo MD   
   
                      Gonorrhea Probe, Ur Negative   Normal     NEG        [facility]  
   
                                        Comment on above:   Result Comment: NEIS  
SERIA GONORRHOEAE DNA not detected by   
nucleic acid amplification.  
This test is intended for medical purposes only and is not   
valid for the  
evaluation of suspected sexual abuse or for other forensic   
purposes.  
In certain contexts, culture may be required to meet   
applicable laws and  
regulations for diagnosis of C. trachomatis and N. gonorrhoeae   
infections.  
Per 2014 CDC recommendations, this test does not include   
confirmation of  
positive results by an alternative nucleic acid target.   
   
                                                            Performed By: #### T  
RCMOL, UCGP ####  
[employer]  
[facility] [place], [place] [address] [phone]  
: Carson Santizo MD   
   
                                                    Trich Vag, Molecularon 10-22  
-2022   
   
                      Trich Vag, Molecular Negative   Normal     NEG        [facility]  
   
                                        Comment on above:   Result Comment: T. v  
aginalis DNA not detected  
Results should be interpreted in conjunction with other   
clinical data.  
This test is intended for medical purposes only and is not   
valid for the  
evaluation of suspected sexual abuse or for other forensic   
purposes.  
This test has not been evaluated in pregnant women or in   
patients less than 16  
years of age.   
   
                                                            Performed By: #### T  
RCCHRIS UCGP ####  
[facility]  
[address] [place], [place] [address] [phone]  
: Carson Santizo MD   
   
                      Source:    .URINE     Normal                [facility]  
   
                                        Comment on above:   Performed By: #### T  
RG, UCGP ####  
[facility]  
[address] [place], [place] [address] [phone]  
: Carson Santizo MD   
   
                                                    Acetaminophenon 2021   
   
                                                    Acetaminophen   
[Mass/Vol]      ug/mL           Low             10-30           [facility]  
   
                                        Comment on above:   Performed By: #### C  
OVRB ####  
[facility]  
[address] [place]   
[place] [address] [phone]  
: Haresh Zimmer MD   
   
                                                    Acetaminophen LevelOrdered B  
y: Seth Rahman on 2021   
   
                          Acetaminophen Level <5           Low          10 - 30   
ug/mL                                   [facility]   
Phone:   
[phone]  
   
                                                    Interpretation and   
review of laboratory   
results         Abnormal                                        [facility]   
Phone:   
[phone]  
   
                                                                  [facility]   
Phone:   
[phone]  
   
                                                    CBC Auto DifferentialOrdered  
 By: Seth Rahman on 2021   
   
                      Absolute Eos # 0.62       High                  [facility]   
Phone:   
[phone]  
   
                                                    Absolute Immature   
Granulocyte     0.07                                            [facility]   
Phone:   
[phone]  
   
                      Absolute Lymph # 3.20                             [facility]   
Phone:   
[phone]  
   
                      Absolute Mono # 0.89                             [facility]   
Phone:   
[phone]  
   
                      Basophils (Bld) [#/Vol] 0.10 10*3/uL                        
 [facility]   
Phone:   
[phone]  
   
                      Basophils/100 WBC (Bld) 1 %                   0 - 2 %    M  
[facility]   
Phone:   
[phone]  
   
                      Differential Type NOT REPORTED                       [facility]   
Phone:   
[phone]  
   
                                                    Eosinophils/100 WBC   
(Bld)           5 %             High            1 - 4 %         [facility]   
Phone:   
[phone]  
   
                                                    Hematocrit (Bld)   
[Volume fraction]   43.4 %                                  36.3 - 47.1   
%                                       [facility]   
Phone:   
[phone]  
   
                                                    Hemoglobin.gastrointest  
inal spec 1 Ql (Stl) 14.6 g/dL                               11.9 - 15.1   
g/dL                                    [facility]   
Phone:   
[phone]  
   
                                                    Immature   
granulocytes/100 WBC   
(Bld)           1 %             High            0               [facility]   
Phone:   
[phone]  
   
                                                    Interpretation and   
review of laboratory   
results         Abnormal                                        [facility]   
Phone:   
[phone]  
   
                                                    Lymphocytes/100 WBC   
(Bld)           26 %                            24 - 43 %       [facility]   
Phone:   
[phone]  
   
                                                    MCH (RBC) [Entitic   
mass]               28.3 pg                                 25.2 - 33.5   
pg                                      [facility]   
Phone:   
[phone]  
   
                          MCHC (RBC) [Mass/Vol] 33.6 g/dL                 28.4 -  
 34.8   
g/dL                                    [facility]   
Phone:   
[phone]  
   
                          MCV (RBC) [Entitic vol] 84.3 fL                   82.6  
 -   
102.9 fL                                [facility]   
Phone:   
[phone]  
   
                      Monocytes/100 WBC (Bld) 7 %                   3 - 12 %   M  
[employer]   
Phone:   
[phone]  
   
                          NRBC Automated 0.0                       0.0 per 100   
WBC                                     [facility]   
Phone:   
[phone]  
   
                                                    Platelet distribution   
width (Bld) [Ratio] 12.3 %                                  11.8 - 14.4   
%                                       [facility]   
Phone:   
[phone]  
   
                      Platelet Estimate NOT REPORTED                       [facility]   
Phone:   
[phone]  
   
                                                    Platelet mean volume   
(Bld) [Entitic vol] 8.7 fL                                  8.1 - 13.5   
fL                                      [facility]   
Phone:   
[phone]  
   
                      Platelets (Bld) [#/Vol] 273 10*3/uL                         
[facility]   
Phone:   
[phone]  
   
                          RBC (Bld) [#/Vol] 5.15 10*6/uL High         3.95 - 5.1  
1   
m/uL                                    [facility]   
Phone:   
[phone]  
   
                      RBC (Bld) [#/Vol] NOT REPORTED                       [facility]   
Phone:   
[phone]  
   
                                                    Segmented   
neutrophils/100 WBC   
(Bld)           60 %                            36 - 65 %       [facility]   
Phone:   
[phone]  
   
                      Segs Absolute 7.61                             [facility]   
Phone:   
[phone]  
   
                      WBC (Bld) [#/Vol] 12.5 10*3/uL High                  [facility]   
Phone:   
[phone]  
   
                      WBC (Bld) [#/Vol] NOT REPORTED                       [facility]   
Phone:   
[phone]  
   
                                                                  [facility]   
Phone:   
[phone]  
   
                                                    CBC with Diffon 2021   
   
                      Abs. Basophil 0.10 k/uL  Normal     0.00-0.20  [facility]  
   
                                        Comment on above:   Performed By: #### D  
AU ####  
[facility]  
[address] [place], [place] [address] [phone]  
: Haresh Zimmer MD   
   
                      Abs.Imm.Granulocyte 0.07 k/uL  Normal     0.00-0.30  [facility]  
   
                                        Comment on above:   Performed By: #### D  
AU ####  
[facility]  
[address] [place], [place] [address] [phone]  
: Haresh Zimmer MD   
   
                      Abs.Neutrophil (Seg) 7.61 k/uL  Normal     1.50-8.10  [facility]  
   
                                        Comment on above:   Performed By: #### D  
AU ####  
[facility]  
[address] [place] [address] [phone]  
: Haresh Zimmer MD   
   
                      Basophils/100 WBC (Bld) 1 %        Normal     0-2        M  
[facility]  
   
                                        Comment on above:   Performed By: #### D  
AU ####  
[facility]  
[address] [place] [address]  
[phone]  
: Haresh Zimmer MD   
   
                                                    Eosinophils (Bld)   
[#/Vol]         0.62 10*3/uL    High            0.00-0.44       [facility]  
   
                                        Comment on above:   Performed By: #### D  
AU ####  
[facility]  
[address] [place] [address] [address] [place], [place] [address] [phone]  
: Haresh Zimmer MD   
   
                                                    Eosinophils/100 WBC   
(Bld)           5 %             High            1-4             [facility]  
   
                                        Comment on above:   Performed By: #### D  
AU ####  
[address] [place] [address]  
[phone]  
: Haresh Zimmer MD   
   
                                                    Erythrocyte   
distribution width   
(RBC) [Ratio]   12.3 %          Normal          11.8-14.4       [facility]  
   
                                        Comment on above:   Performed By: #### D  
AU ####  
[address] [place], [facility] [phone]  
: Haresh Zimmer MD   
   
                                                    Hematocrit (Bld)   
[Volume fraction] 43.4 %          Normal          36.3-47.1       [facility]  
   
                                        Comment on above:   Performed By: #### D  
AU ####  
[address] [place], [facility] [phone]  
: Haresh Zimmer MD   
   
                                                    Hemoglobin (Bld)   
[Mass/Vol]      14.6 g/dL       Normal          11.9-15.1       [facility]  
   
                                        Comment on above:   Performed By: #### D  
AU ####  
[address] [place] [address]  
[phone]  
: Haresh Zimmer MD   
   
                                                    Immature   
granulocytes/100 WBC   
(Bld)           1 %             High            0               [facility]  
   
                                        Comment on above:   Performed By: #### D  
AU ####  
[address] [place], [place] [address] [phone]  
: Haresh Zimmer MD   
   
                                                    Lymphocytes (Bld)   
[#/Vol]         3.20 10*3/uL    Normal          1.10-3.70       [facility]  
   
                                        Comment on above:   Performed By: #### D  
AU ####  
[facility]  
[address] [place] [address] [phone]  
: Haresh Zimmer MD   
   
                                                    Lymphocytes/100 WBC   
(Bld)           26 %            Normal          24-43           [facility]  
   
                                        Comment on above:   Performed By: #### D  
AU ####  
[facility]  
[address] [place], [facility] [phone]  
: Haresh Zimmer MD   
   
                                                    MCH (RBC) [Entitic   
mass]           28.3 pg         Normal          25.2-33.5       [facility]  
   
                                        Comment on above:   Performed By: #### D  
AU ####  
[address] [place] [address] [phone]  
: Haresh Zimmer MD   
   
                      MCHC (RBC) [Mass/Vol] 33.6 g/dL  Normal     28.4-34.8  [facility]  
   
                                        Comment on above:   Performed By: #### D  
AU ####  
[address] [place], [place] [address]  
[phone]  
: Haresh Zimmer MD   
   
                      MCV (RBC) [Entitic vol] 84.3 fL    Normal     82.6-102.9 [facility]  
   
                                        Comment on above:   Performed By: #### D  
AU ####  
[address] [place], [place] [address]  
[phone]  
: Haresh Zimmer MD   
   
                      Monocytes (Bld) [#/Vol] 0.89 10*3/uL Normal     0.10-1.20   
 [facility]  
   
                                        Comment on above:   Performed By: #### D  
AU ####  
[address] [place], [facility] [phone]  
: Haresh Zimmer MD   
   
                      Monocytes/100 WBC (Bld) 7 %        Normal     3-12       M  
[facility]  
   
                                        Comment on above:   Performed By: #### D  
AU ####  
[address] [place], [place] [address] [phone]  
: Haresh Zimmer MD   
   
                      Neutrophil (Seg) 60 %       Normal     36-65      [facility]  
   
                                        Comment on above:   Performed By: #### D  
AU ####  
[facility]  
[address] [place] [address] [address] [place], [place] [address] [phone]  
: Haresh Zimmer MD   
   
                      NRBC Automated 0.0 per 100 WBC Normal     0.0        [facility]  
   
                                        Comment on above:   Performed By: #### D  
AU ####  
[facility]  
[address] [place] [address] [place] [place], [place] [address] [phone]  
: Haresh Zimmer MD   
   
                                                    Platelet mean volume   
(Bld) [Entitic vol] 8.7 fL          Normal          8.1-13.5        [facility]  
   
                                        Comment on above:   Performed By: #### D  
AU ####  
[address] [place], [place] [address] [phone]  
: Haresh Zimmer MD   
   
                      Platelets (Bld) [#/Vol] 273 10*3/uL Normal     138-453      
[facility]  
   
                                        Comment on above:   Performed By: #### D  
AU ####  
[address] [place], [place] [address] [phone]  
: Haresh Zimmer MD   
   
                      RBC (Bld) [#/Vol] 5.15 10*6/uL High       3.95-5.11  [facility]  
   
                                        Comment on above:   Performed By: #### D  
AU ####  
[facility]  
[address] [place] [address] [address] [place], [place] [address] [phone]  
: Haresh Zimmer MD   
   
                      WBC (Bld) [#/Vol] 12.5 10*3/uL High       3.5-11.3   [facility]  
   
                                        Comment on above:   Performed By: #### D  
AU ####  
[facility]  
[address] [place] [address] [address] [place], [place] [address] [phone]  
: Haresh Zimmer MD   
   
                      Auto Diff Performed NOT REPORTED Normal                [facility]  
   
                                        Comment on above:   Performed By: #### D  
AU ####  
[facility]  
[address] [place], [place] [address] [phone]  
: Haresh Zimmer MD   
   
                      Platelet Estimate NOT REPORTED Normal                [facility]  
   
                                        Comment on above:   Performed By: #### D  
AU ####  
[facility]  
[address] [place], [place] [address] [phone]  
: Haresh Zimmer MD   
   
                                                    RBC morphology finding   
Nom (Bld)       NOT REPORTED    Normal                          [facility]  
   
                                        Comment on above:   Performed By: #### D  
AU ####  
[facility]  
[address] [place] [address] [phone]  
: Haresh Zimmer MD   
   
                      WBC Morphology NOT REPORTED Normal                [facility]  
   
                                        Comment on above:   Performed By: #### D  
AU ####  
[facility]  
[address] [place], [place] [address] [phone]  
: Haresh Zimmer MD   
   
                                                    COVID-19, RapidOrdered By: DAVID Rahman on 2021   
   
                                                    SARS-CoV-2 (COVID-19)   
RNA PAMELA+probe Ql (Unsp   
spec)               Not detected                            Not   
Detected                                [facility]  
Work   
Phone:   
[phone]  
   
                                        Comment on above:     
Rapid NAAT: The specimen is NEGATIVE for SARS-CoV-2, the novel   
coronavirus associated with  
COVID-19.  
  
The ID NOW COVID-19 assay is designed to detect the virus that   
causes COVID-19 in patients  
with signs and symptoms of infection who are suspected of   
COVID-19.  
An individual without symptoms of COVID-19 and who is not   
shedding SARS-CoV-2 virus would  
expect to have a negative (not detected) result in this assay.  
Negative results should be treated as presumptive and, if   
inconsistent with clinical signs  
and symptoms or necessary for patient management,  
should be tested with an alternative molecular assay. Negative   
results do not preclude  
SARS-CoV-2 infection and  
should not be used as the sole basis for patient management   
decisions.  
  
Fact sheet for Healthcare Providers:   
https://www.fda.gov/media/333234/download  
Fact sheet for Patients:   
https://www.fda.gov/media/679269/download  
  
Methodology: Isothermal Nucleic Acid Amplification  
  
   
   
                      Specimen Description .NASOPHARYNGEAL SWAB                   
      [facility]  
Work   
Phone:   
[phone]  
   
                                                                  [facility]  
Work   
Phone:   
[phone]  
   
                                                    Comp Metabolic Profon 2021   
   
                      (cont.)               Normal                [facility]  
   
                                        Comment on above:   Result Comment: Aver  
age GFR for 20-29 years old:  
116 mL/min/1.73sq m  
Chronic Kidney Disease:  
<60 mL/min/1.73sq m  
Kidney failure:  
<15 mL/min/1.73sq m  
eGFR calculated using average adult body mass. Additional eGFR   
calculator  
available at:  
http://www.Segment/multiple_crcl_.htm   
   
                                                            Performed By: #### D  
AU ####  
[facility]  
[address] [place] [address] [phone]  
: Haresh Zimmer MD   
   
                      Albumin [Mass/Vol] 4.2 g/dL   Normal     3.5-5.2    [facility]  
   
                                        Comment on above:   Performed By: #### D  
AU ####  
[facility]  
[address] [place], [place] [id number] [phone]  
: Haresh Zimmer MD   
   
                      Albumin/Glob Ratio 1.4        Normal     1.0-2.5    [facility]  
   
                                        Comment on above:   Performed By: #### D  
AU ####  
[facility]  
[address] [place], [place] [address] [phone]  
: Haresh Zimmer MD   
   
                      Alkaline Phos 74 U/L     Normal          [facility]  
   
                                        Comment on above:   Performed By: #### D  
AU ####  
[facility]  
[address] [place], [place] [address] [phone]  
: Haresh Zimmer MD   
   
                                                    ALT [Catalytic   
activity/Vol]   31 U/L          Normal          5-33            [facility]  
   
                                        Comment on above:   Performed By: #### D  
AU ####  
[facility]  
[address] [place] [address] [phone]  
: Haresh Zimmer MD   
   
                      Anion gap [Moles/Vol] 13 mmol/L  Normal     9-17       [facility]  
   
                                        Comment on above:   Performed By: #### D  
AU ####  
[facility]  
[address] [place], [place] [address] [phone]  
: Haresh Zimmer MD   
   
                                                    AST [Catalytic   
activity/Vol]   19 U/L          Normal          <32             [facility]  
   
                                        Comment on above:   Performed By: #### D  
AU ####  
[facility]  
[address] [place], [place] [address] [phone]  
: Haresh Zimmer MD   
   
                      Bilirubin [Mass/Vol] 0.17 mg/dL Low        0.3-1.2    [facility]  
   
                                        Comment on above:   Performed By: #### D  
AU ####  
[facility]  
[address] [place] [address] [address] [place], [place] [address] [phone]  
: Haresh Zimmer MD   
   
                      BUN/CRE Ratio 7          Low        9-20       [facility]  
   
                                        Comment on above:   Performed By: #### D  
AU ####  
[facility]  
[address] [place] [address] [place] [address]  
[phone]  
: Haresh Zimmer MD   
   
                      Calcium [Mass/Vol] 9.2 mg/dL  Normal     8.6-10.4   [facility]  
   
                                        Comment on above:   Performed By: #### D  
AU ####  
[facility]  
[address] [place] [address]  
[phone]  
: Haresh Zimmer MD   
   
                      Chloride [Moles/Vol] 104 mmol/L Normal          [facility]  
   
                                        Comment on above:   Performed By: #### D  
AU ####  
[facility]  
[address] [place] [address]  
[phone]  
: Haresh Zimmer MD   
   
                      CO2 [Moles/Vol] 20 mmol/L  Normal     20-31      [facility]  
   
                                        Comment on above:   Performed By: #### D  
AU ####  
[facility]  
[address] [place], [place] [address] [phone]  
: Haresh Zimmer MD   
   
                      Creatinine [Mass/Vol] 0.82 mg/dL Normal     0.50-0.90  [facility]  
   
                                        Comment on above:   Performed By: #### D  
AU ####  
[facility]  
[address] [place], [place] [address] [phone]  
: Haresh Zimmer MD   
   
                      GFR, Amer >60        Normal     >60        [facility]  
   
                                        Comment on above:   Performed By: #### D  
AU ####  
[facility]  
[address] [place], [place] [address] [phone]  
: Haresh Zimmer MD   
   
                      GFR,non  Amer >60        Normal     >60        [facility]  
   
                                        Comment on above:   Performed By: #### D  
AU ####  
[facility]  
[address] [place], [place] [address] [phone]  
: Haresh Zimmer MD   
   
                      Glucose [Mass/Vol] 100 mg/dL  High       70-99      [facility]  
   
                                        Comment on above:   Performed By: #### D  
AU ####  
[facility]  
[address] [place], [place] [address] [phone]  
: Haresh Zimmer MD   
   
                      Potassium [Moles/Vol] 4.0 mmol/L Normal     3.7-5.3    [facility]  
   
                                        Comment on above:   Performed By: #### D  
AU ####  
[facility]  
[address] [place] [address] [place] [address]  
[phone]  
: Haresh Zimmer MD   
   
                      Protein [Mass/Vol] 7.2 g/dL   Normal     6.4-8.3    [facility]  
   
                                        Comment on above:   Performed By: #### D  
AU ####  
[facility]  
[address] [place], [place] [address] [phone]  
: Haresh Zimmer MD   
   
                      Sodium [Moles/Vol] 137 mmol/L Normal     135-144    [facility]  
   
                                        Comment on above:   Performed By: #### D  
AU ####  
[facility]  
[address] [place] [address] [phone]  
: Haresh Zimmer MD   
   
                      Staging:              Normal                [facility]  
   
                                        Comment on above:   Result Comment: Stag  
e 1: Some kidney damage normal GFR  
Stage 2: Mild kidney damage GFR 60-89  
Stage 3: Moderate kidney damage GFR 30-59  
Stage 4: Severe kidney damage GFR 15-29  
Stage 5: Severe kidney damage GFR <15  
ESRD - chronic treatment by dialysis or transplant   
   
                                                            Performed By: #### D  
AU ####  
[facility]  
[address] [place] [address] [phone]  
: Haresh Zimmer MD   
   
                                                    Urea nitrogen   
[Mass/Vol]      6 mg/dL         Normal          6-20            [facility]  
   
                                        Comment on above:   Performed By: #### D  
AU ####  
[facility]  
[address] [place] [address] [phone]  
: Haresh Zimmer MD   
   
                                                    Comprehensive Metabolic Pane  
lOrdered By: Seth Rahman on 2021   
   
                          Albumin [Mass/Vol] 4.2 g/dL                  3.5 - 5.2  
   
g/dL                                    [facility]   
Phone:   
[phone]  
   
                                                    Albumin/Globulin [Mass   
ratio]          1.4 {ratio}                                     [facility]   
Phone:   
[phone]  
   
                                                    ALP (Bld) [Catalytic   
activity/Vol]       74 U/L                                  35 - 104   
U/L                                     [facility]   
Phone:   
[phone]  
   
                                                    ALT [Catalytic   
activity/Vol]   31 U/L                          5 - 33 U/L      [facility]   
Phone:   
[phone]  
   
                          Anion gap [Moles/Vol] 13 mmol/L                 9 - 17  
   
mmol/L                                  [facility]   
Phone:   
[phone]  
   
                                                    AST [Catalytic   
activity/Vol]   19 U/L                          <32             [facility]   
Phone:   
[phone]  
   
                          Bilirubin [Mass/Vol] 0.17 mg/dL   Low          0.3 - 1  
.2   
mg/dL                                   [facility]   
Phone:   
[phone]  
   
                          Calcium [Mass/Vol] 9.2 mg/dL                 8.6 - 10.  
4   
mg/dL                                   [facility]   
Phone:   
[phone]  
   
                          Chloride [Moles/Vol] 104 mmol/L                98 - 10  
7   
mmol/L                                  [facility]   
Phone:   
[phone]  
   
                          CO2 [Moles/Vol] 20 mmol/L                 20 - 31   
mmol/L                                  [facility]   
Phone:   
[phone]  
   
                          Creatinine [Mass/Vol] 0.82 mg/dL                0.50 -  
 0.90   
mg/dL                                   [facility]   
Phone:   
[phone]  
   
                                                    Free PSA/Total PSA   
[Mass fraction]     7.2 g/dL                                6.4 - 8.3   
g/dL                                    [facility]   
Phone:   
[phone]  
   
                      GFR  >60                   >60 mL/min [facility]  
y   
Health  
Work   
Phone:   
[phone]  
   
                                                    GFR Non-   
American        >60                             >60 mL/min      [facility]   
Phone:   
[phone]  
   
                          Glucose [Mass/Vol] 100 mg/dL    High         70 - 99   
mg/dL                                   [facility]   
Phone:   
[phone]  
   
                          Potassium [Moles/Vol] 4.0 mmol/L                3.7 -   
5.3   
mmol/L                                  [facility]   
Phone:   
[phone]  
   
                          Sodium [Moles/Vol] 137 mmol/L                135 - 144  
   
mmol/L                                  [facility]   
Phone:   
[phone]  
   
                                                    Urea nitrogen (BldV)   
[Mass/Vol]          6 mg/dL                                 6 - 20   
mg/dL                                   [facility]   
Phone:   
[phone]  
   
                                                    Urea   
nitrogen/Creatinine   
(Bld) [Mass ratio] 7               Low                             [facility]   
Phone:   
[phone]  
   
                                                    Drug Scr, Abuse, Uron 2021   
   
                      Amphetamine(s),Ur Negative   Normal     NEG        [facility]  
   
                                        Comment on above:   Performed By: #### D  
AU ####  
[facility]  
[address] [place] [address] [phone]  
: Haresh Zimmer MD   
   
                      Barbiturate(s),Ur Negative   Normal     NEG        [facility]  
   
                                        Comment on above:   Performed By: #### D  
AU ####  
[facility]  
[address] [place], [place] [address] [phone]  
: Haresh Zimmer MD   
   
                      Benzodiazepine(s) Negative   Normal     NEG        [facility]  
   
                                        Comment on above:   Performed By: #### D  
AU ####  
[facility]  
[address] [place], [place] [address] [phone]  
: Haresh Zimmer MD   
   
                      Buprenorphrine, Ur Negative   Normal     NEG        [facility]  
   
                                        Comment on above:   Performed By: #### D  
AU ####  
[facility]  
[address] [place], [place] [address] [phone]  
: Haresh Zimmer MD   
   
                      Cannabinoid(s),Ur Positive   Abnormal   NEG        [facility]  
   
                                        Comment on above:   Performed By: #### D  
AU ####  
[facility]  
[address] [place], [place] [address] [phone]  
: Haresh Zimmer MD   
   
                      Cocaine Metabolite Negative   Normal     [facility]  
   
                                        Comment on above:   Performed By: #### D  
AU ####  
[facility]  
[address] [place] [address]  
[phone]  
: Haresh Zimmer MD   
   
                      Methadone Ql (U) Negative   Normal     [facility]  
   
                                        Comment on above:   Performed By: #### D  
AU ####  
[facility]  
[address] [place] [address] [address] [place], [place] [address] [phone]  
: Haresh Zimmer MD   
   
                      Methamphetamine, Ur Negative   Normal     [facility]  
   
                                        Comment on above:   Performed By: #### D  
AU ####  
[facility]  
[address] [place], [place] [address] [phone]  
: Haresh Zimmer MD   
   
                      Opiate(s), Ur Negative   Normal     [facility]  
   
                                        Comment on above:   Performed By: #### D  
AU ####  
[facility]  
[address] [place], [place] [address] [phone]  
: Haresh Zimmer MD   
   
                      Oxycodone, Urine Negative   Normal     [facility]  
   
                                        Comment on above:   Performed By: #### D  
AU ####  
[facility]  
[address] [place], [place] [address] [phone]  
: Haresh Zimmer MD   
   
                      Phencyclidine, Ur Negative   Normal     NEG        [facility]  
   
                                        Comment on above:   Performed By: #### D  
AU ####  
[facility]  
[address] [place], [place] [address] [phone]  
: Haresh Zimmer MD   
   
                      Propoxyphene,Urine Negative   Normal     NEG        [facility]  
   
                                        Comment on above:   Performed By: #### D  
AU ####  
[facility]  
[address] [place], [place] [address] [phone]  
: Haresh Zimmer MD   
   
                                                    Tricyclic   
antidepressants Screen   
Ql (U)          Negative        Normal          NEG             [facility]  
   
                                        Comment on above:   Result Comment: Drug  
 screen results are to be used for medical  
  
purposes only. All positive  
results are unconfirmed. Testing for employment or legal uses   
should be sent  
to a reference laboratory for confirmation.   
   
                                                            Performed By: #### D  
AU ####  
[facility]  
[address] [place] [address] [place] [address]  
[phone]  
: Haresh Zimmer MD   
   
                      Interpretive Info NOT REPORTED Normal                [facility]  
   
                                        Comment on above:   Performed By: #### D  
AU ####  
[facility]  
[address] [place] [address] [address] [place], [place] [address] [phone]  
: Haresh Zimmer MD   
   
                      MDMA, Urine NOT REPORTED Normal     NEG        [facility]  
   
                                        Comment on above:   Performed By: #### D  
AU ####  
[facility]  
[address] [place], [place] [address] [phone]  
: Haresh Zimmer MD   
   
                                                    EthanolOrdered By: Seth purvis on 2021   
   
                      Ethanol [Mass/Vol] mg/dL                 <10 mg/dL  [facility]  
[employer]   
Phone:   
[phone]  
   
                      Ethanol percent <0.010                <0.010 %   [facility]   
Phone:   
[phone]  
   
                                                                  [facility]   
Phone:   
[phone]  
   
                                                    Ethanol Alcoholon 2021  
   
   
                      Ethanol [Mass/Vol] mg/dL      Normal     <10        [facility]  
   
                                        Comment on above:   Performed By: #### C  
OVRB ####  
[facility]  
[address] [place] [address]  
[phone]  
: Haresh Zimmer MD   
   
                      Ethanol percent <0.010     Normal     <0.010     [facility]  
   
                                        Comment on above:   Performed By: #### C  
OVRB ####  
[facility]  
[address] [place] [address] [phone]  
: Haresh Zimmer MD   
   
                                                    HCG Qualitative, SerumOrdere  
d By: Seth Rahman on 2021   
   
                      hCG Qual   Negative              NEGATIVE   [facility]  
[employer]   
Phone:   
[phone]  
   
                                        Comment on above:   Specimens with hCG l  
evels near the threshold of the test (25   
mIU/mL) may give a negative or  
indeterminate result. In such cases, another test should be   
performed with a new specimen  
in 48-72 hours. If early pregnancy is suspected clinically in   
this setting, correlation  
with quantitative serum b-hCG level is suggested.  
  
[employer] has confirmed the use of plasma for this   
test. This has not been cleared  
or approved by the U.S. Food and Drug Administration. The FDA   
has determined that such  
clearance is not necessary.  
  
   
   
                                                                  [facility]   
Phone:   
[phone]  
   
                                                    HCG Screen, Bloodon 20  
21   
   
                      HCG Screen, Blood Negative   Normal     NEG        [facility]  
   
                                        Comment on above:   Result Comment: Spec  
imens with hCG levels near the threshold   
of the test (25 mIU/mL) may give a  
negative or indeterminate result. In such cases, another test   
should be  
performed with a new specimen in 48-72 hours. If early   
pregnancy is suspected  
clinically in this setting, correlation with quantitative   
serum b-hCG level is  
suggested.  
[employer] has confirmed the use of plasma for this   
test. This has not  
been cleared or approved by the U.S. Food and Drug   
Administration. The FDA has  
determined that such clearance is not necessary.   
   
                                                            Performed By: #### D  
AU ####  
[facility]  
[address] [place] [address] [phone]  
: Haresh Zimmer MD   
   
                                                    Laboratory - Chemistry and C  
hemistry - challengeOrdered By: Seth Rahman on   
2021   
   
                                                    GFR/1.73 sq M.predicted   
MDRD (S/P/Bld) [Vol   
rate/Area]                                                      [facility]   
Phone:   
[phone]  
   
                                        Comment on above:   Average GFR for 20-2  
9 years old:  
116 mL/min/1.73sq m  
Chronic Kidney Disease:  
<60 mL/min/1.73sq m  
Kidney failure:  
<15 mL/min/1.73sq m  
  
  
eGFR calculated using average adult body mass. Additional eGFR   
calculator available at:  
  
http://www.Segment/multiple_crcl_2012.htm  
  
  
   
   
                                                            Stage 1: Some kidney  
 damage normal GFR  
Stage 2: Mild kidney damage GFR 60-89  
Stage 3: Moderate kidney damage GFR 30-59  
Stage 4: Severe kidney damage GFR 15-29  
Stage 5: Severe kidney damage GFR <15  
ESRD - chronic treatment by dialysis or transplant  
  
  
   
   
                                                    No Panel InformationOrdered   
By: Seth Rahman on 2021   
   
                                                    Interpretation and   
review of laboratory   
results         Abnormal                                        [facility]   
Phone:   
[phone]  
   
                                                                  [facility]   
Phone:   
[phone]  
   
                                                    SARS-CoV-2on 2021   
   
                                                    SARS-CoV-2 (COVID-19)   
RNA PAMELA+probe Ql (Unsp   
spec)           Not detected    Normal          [facility]  
   
                                        Comment on above:   Result Comment:  
Rapid NAAT: The specimen is NEGATIVE for SARS-CoV-2, the novel   
coronavirus  
associated with COVID-19.  
The ID NOW COVID-19 assay is designed to detect the virus that   
causes COVID-19  
in patients with signs and symptoms of infection who are   
suspected of COVID-19.  
An individual without symptoms of COVID-19 and who is not   
shedding SARS-CoV-2  
virus would expect to have a negative (not detected) result in   
this assay.  
Negative results should be treated as presumptive and, if   
inconsistent with  
clinical signs and symptoms or necessary for patient   
management,  
should be tested with an alternative molecular assay. Negative   
results do not  
preclude SARS-CoV-2 infection and  
should not be used as the sole basis for patient management   
decisions.  
Fact sheet for Healthcare Providers:   
https://www.fda.gov/media/190087/download  
Fact sheet for Patients:   
https://www.fda.gov/media/967333/download  
Methodology: Isothermal Nucleic Acid Amplification   
   
                                                            Performed By: #### C  
OVRB ####  
[facility]  
[address] [place], [place] [address] [phone]  
: Haresh Zimmer MD   
   
                                                    Salicylateon 2021   
   
                      Salicylate <1         Low        3-10       [facility]  
   
                                        Comment on above:   Performed By: #### D  
AU ####  
[facility]  
[address] [place] [address] [phone]  
: Haresh Zimmer MD   
   
                                                    SalicylateOrdered By: Meghna Rahman on 2021   
   
                          Salicylate Lvl <1           Low          3 - 10   
mg/dL                                   [facility]  
Work   
Phone:   
[phone]  
   
                                                    Urinalysis w/ Microon 2021   
   
                      -----                 Normal                [facility]  
   
                                        Comment on above:   Performed By: #### C  
OVRB ####  
[facility]  
[address] [place], [place] [address] [phone]  
: Haresh Zimmer MD   
   
                      Bacteria   1+         Abnormal   NONE       [facility]  
   
                                        Comment on above:   Performed By: #### C  
OVRB ####  
[facility]  
[address] [place], [place] [address] [phone]  
: Haresh Zimmer MD   
   
                      Bilirubin, SemiQt,Ur Negative   Normal     NEG        [facility]  
   
                                        Comment on above:   Performed By: #### C  
OVRB ####  
[facility]  
[address] [place] [address] [phone]  
: Haresh Zimmer MD   
   
                      Blood, Urine Negative   Normal     NEG        [facility]  
   
                                        Comment on above:   Performed By: #### C  
OVRB ####  
[facility]  
[address] [place] [address] [phone]  
: Haresh Zimmer MD   
   
                      Clarity (U) CLEAR      Normal     CLEAR      [facility]  
   
                                        Comment on above:   Performed By: #### C  
OVRB ####  
[facility]  
[address] [place], [place] [address] [phone]  
: Haresh Zimmer MD   
   
                      Color (U)  YELLOW     Normal     YEL        [facility]  
   
                                        Comment on above:   Performed By: #### C  
OVRB ####  
[facility]  
[address] [place], [place] [address] [phone]  
: Haresh Zimmer MD   
   
                                                    Epithelial cells LM Ql   
(Urine sed)     5 TO 10         Normal          0-25            [facility]  
   
                                        Comment on above:   Performed By: #### C  
OVRB ####  
[facility]  
[address] [place], [place] [address] [phone]  
: Haresh Zimmer MD   
   
                      Glucose Ql (U) Negative   Normal     NEG        [facility]  
   
                                        Comment on above:   Performed By: #### C  
OVRB ####  
[facility]  
[address] [place], [place] [address] [phone]  
: Haresh Zimmer MD   
   
                      Ketones Ql (U) Negative   Normal     NEG        [facility]  
   
                                        Comment on above:   Performed By: #### C  
OVRB ####  
[address] [place], [place] [address] [phone]  
: Haresh Zimmer MD   
   
                                                    Leukocyte esterase Test   
strip Ql (U)    Negative        Normal          NEG             [facility]  
   
                                        Comment on above:   Performed By: #### C  
OVRB ####  
[facility]  
[address] [place], [place] [address] [phone]  
: Haresh Zimmer MD   
   
                      Mucus Strands 1+         Abnormal   NONE       [facility]  
   
                                        Comment on above:   Performed By: #### C  
OVRB ####  
[facility]  
[address] [place] [address] [phone]  
: Haresh Zimmer MD   
   
                      Nitrite,Ur Negative   Normal     NEG        [facility]  
   
                                        Comment on above:   Performed By: #### C  
OVRB ####  
[facility]  
[address] [place] [address]  
[phone]  
: Haresh Zimmer MD   
   
                      PH,Ur      6.5        Normal     5.0-9.0    [facility]  
   
                                        Comment on above:   Performed By: #### C  
OVRB ####  
[facility]  
[address] [place] [address]  
[phone]  
: Haresh Zimmer MD   
   
                      Protein Ql (U) Negative   Normal     NEG        [facility]  
   
                                        Comment on above:   Performed By: #### C  
OVRB ####  
[facility]  
[address] [place] [address]  
[phone]  
: Haresh Zimmer MD   
   
                      Spec. Gravity,Ur 1.020      Normal     1.010-1.020 [facility]  
   
                                        Comment on above:   Performed By: #### C  
OVRB ####  
[facility]  
[address] [place] [address]  
[phone]  
: Haresh Zimmer MD   
   
                      Urine RBC's 0 TO 2     Normal     0-2        [facility]  
   
                                        Comment on above:   Performed By: #### C  
OVRB ####  
[facility]  
[address] [place], [place] [address] [phone]  
: Haresh Zimmer MD   
   
                      Urine WBC's 0 TO 2     Normal     0-5        [facility]  
   
                                        Comment on above:   Performed By: #### C  
OVRB ####  
[facility]  
[address] [place], [facility]  
[phone]  
: Haresh Zimmer MD   
   
                      Urobilinogen,Ur Normal     Normal     NORM       [facility]  
   
                                        Comment on above:   Performed By: #### C  
OVRB ####  
[facility]  
[address] [place], [place] [address] [phone]  
: Haresh Zimmer MD   
   
                                                    Amorphous sediment LM   
Ql (Urine sed)  NOT REPORTED    Normal          NONE            [facility]  
   
                                        Comment on above:   Performed By: #### C  
OVRB ####  
[facility]  
[address] [place] [address] [phone]  
: Haresh Zimmer MD   
   
                      Casts      NOT REPORTED Normal                [facility]  
   
                                        Comment on above:   Performed By: #### C  
OVRB ####  
[facility]  
[address] [place] [address] [phone]  
: Haresh Zimmer MD   
   
                      Comment    NOT REPORTED Normal                [facility]  
   
                                        Comment on above:   Performed By: #### C  
OVRB ####  
[facility]  
[address] [place] [address] [phone]  
: Haresh Zimmer MD   
   
                                                    Crystals LM Nom (Urine   
sed)            NOT REPORTED    Normal          [facility]  
   
                                        Comment on above:   Performed By: #### C  
OVRB ####  
[facility]  
[address] [place] [address] [address] [place], [place] [address] [phone]  
: Haresh Zimmer MD   
   
                      Epithelial, Renal NOT REPORTED Normal     0          [facility]  
   
                                        Comment on above:   Performed By: #### C  
OVRB ####  
[facility]  
[address] [place], [place] [address] [phone]  
: Haresh Zimmer MD   
   
                      Other Observations NOT REPORTED Normal     [facility]  
   
                                        Comment on above:   Performed By: #### C  
OVRB ####  
[facility]  
[address] [place] [address] [address] [place], [place] [address] [phone]  
: Haresh Zimmer MD   
   
                      Trichomonas NOT REPORTED Normal     [facility]  
   
                                        Comment on above:   Performed By: #### C  
OVRB ####  
[facility]  
[address] [place], [place] [address] [phone]  
: Haresh Zimmer MD   
   
                      Yeast      NOT REPORTED Normal     [facility]  
   
                                        Comment on above:   Performed By: #### C  
OVRB ####  
[facility]  
[address] [place] [address] [phone]  
: Haresh Zimmer MD   
   
                                                    Urinalysis with microscopicO  
rdered By: Seth Rahman on 2021   
   
                      -                                           [facility]  
Work   
Phone:   
[phone]  
[phone]  
   
                      Amorphous, UA NOT REPORTED            None       [facility]  
Work   
Phone:   
[phone]  
   
                      Bacteria, UA 1+         Abnormal   None       [employer]  
Work   
Phone:   
[phone]  
   
                      Bilirubin Urine Negative              NEGATIVE   [employer]  
Work   
Phone:   
[phone]  
   
                      Casts UA   NOT REPORTED            /LPF       [employer]  
Work   
Phone:   
[phone]  
   
                      Color, UA  YELLOW                YELLOW     [employer]  
Work   
Phone:   
[phone]  
   
                      Crystals, UA NOT REPORTED            None /HPF  [facility]  
Work   
Phone:   
[phone]  
   
                      Epithelial Cells UA 5 TO 10                          [employer]  
Work   
Phone:   
[phone]  
   
                      Glucose, Ur Negative              NEGATIVE   [employer]  
Work   
Phone:   
[phone]  
   
                                                    Interpretation and   
review of laboratory   
results         Abnormal                                        [employer]  
Work   
Phone:   
[phone]  
   
                      Ketones Ql (U) Negative              NEGATIVE   [employer]  
Work   
Phone:   
[phone]  
   
                                                    Leukocyte esterase Test   
strip Ql (U)    Negative                        NEGATIVE        [employer]  
Work   
Phone:   
[phone]  
   
                      Mucus, UA  1+         Abnormal   None       [employer]  
Work   
Phone:   
[phone]  
   
                      Nitrite, Urine Negative              NEGATIVE   [employer]  
Work   
Phone:   
[phone]  
   
                      Other Observations UA NOT REPORTED            NOT REQ.   [facility]  
[facility]  
Work   
Phone:   
[phone]  
   
                      pH, UA     6.5                              [facility]  
Work   
Phone:   
[phone]  
   
                      Protein, UA Negative              NEGATIVE   [employer]  
Work   
Phone:   
[phone]  
   
                      RBC, UA    0 TO 2                           [employer]  
Work   
Phone:   
[phone]  
   
                      Renal Epithelial, UA NOT REPORTED            0 /HPF     [facility]  
y   
[employer]  
Work   
Phone:   
[phone]  
   
                      Specific Gravity, UA 1.020                            [facility]  
[employer]  
Work   
Phone:   
[phone]  
   
                      Trichomonas, UA NOT REPORTED            None       [employer]  
Work   
Phone:   
[phone]  
   
                      Turbidity UA CLEAR                 CLEAR      [employer]  
Work   
Phone:   
[phone]  
   
                      Urinalysis Comments NOT REPORTED                       [facility]  
   
Health  
Work   
Phone:   
[phone]  
   
                      Urine Hgb  Negative              NEGATIVE   [facility]   
[facility]  
Work   
Phone:   
[phone]  
   
                      Urobilinogen, Urine Normal                Normal     [facility]  
   
Health  
Work   
Phone:   
[phone]  
   
                      WBC, UA    0 TO 2                           [facility]   
[facility]  
Work   
Phone:   
[phone]  
   
                      Yeast, UA  NOT REPORTED            None       [facility]   
[facility]  
Work   
Phone:   
[phone]  
   
                                                                  [facility]   
Health  
Work   
Phone:   
[phone]  
   
                                                    Urine Drug ScreenOrdered By:  
 Seth Rahman on 2021   
   
                      Amphetamine Screen, Ur Negative              NEGATIVE   [facility]  
y   
Health  
Work   
Phone:   
[phone]  
   
                      Barbiturate Screen, Ur Negative              NEGATIVE   [facility]   
[facility]  
Work   
Phone:   
[phone]  
   
                                                    Benzodiazepine Screen,   
Urine           Negative                        NEGATIVE        [facility]   
[facility]  
Work   
Phone:   
[phone]  
   
                      Buprenorphine Urine Negative              NEGATIVE   [facility]  
   
Health  
Work   
Phone:   
[phone]  
   
                      Cannabinoid Scrn, Ur Positive   Abnormal   NEGATIVE   [facility]  
y   
Health  
Work   
Phone:   
[phone]  
   
                                                    Cocaine Metabolite,   
Urine           Negative                        NEGATIVE        [facility]   
Health  
Work   
Phone:   
[phone]  
   
                                                    Interpretation and   
review of laboratory   
results         Abnormal                                        [facility]   
Health  
Work   
Phone:   
[phone]  
   
                      MDMA, Urine NOT REPORTED            NEGATIVE   [facility]   
Health  
Work   
Phone:   
[phone]  
   
                      Methadone Screen, Urine Negative              NEGATIVE   [facility]   
[facility]  
Work   
Phone:   
[phone]  
   
                      Methamphetamine, Urine Negative              NEGATIVE   [facility]   
Health  
Work   
Phone:   
[phone]  
   
                      Opiates, Urine Negative              NEGATIVE   [facility]   
Health  
Work   
Phone:   
[phone]  
   
                      Oxycodone Screen, Ur Negative              NEGATIVE   [facility]  
y   
Health  
Work   
Phone:   
[phone]  
   
                      Phencyclidine, Urine Negative              NEGATIVE   [facility]  
y   
Health  
Work   
Phone:   
[phone]  
   
                      Propoxyphene, Urine Negative              NEGATIVE   [facility]  
   
Health  
Work   
Phone:   
[phone]  
   
                      Test Information NOT REPORTED                       [facility]   
Health  
Work   
Phone:   
[phone]  
   
                                                    Tricyclic   
Antidepressants, Urine Negative                        NEGATIVE        [facility]   
Health  
Work   
Phone:   
[phone]  
   
                                        Comment on above:   Drug screen results   
are to be used for medical purposes only.   
All positive results are  
unconfirmed. Testing for employment or legal uses should be   
sent to a reference laboratory  
for confirmation.  
  
   
   
                                                                  [facility]   
Phone:   
[phone]  
   
                                                    Acetaminophenon 2021   
   
                                                    Acetaminophen   
[Mass/Vol]      ug/mL           Low             10-30           [facility]  
   
                                        Comment on above:   Performed By: #### D  
AU ####  
[facility]  
[address] [place] [address] [phone]  
: Haresh Zimmer MD   
   
                                                    Acetaminophen levelOrdered B  
y: Malika Spicerin on 2021   
   
                          Acetaminophen Level <5           Low          10 - 30   
ug/mL                                   [employer]   
Phone:   
[phone]  
   
                                                    Interpretation and   
review of laboratory   
results         Abnormal                                        [facility]   
Phone:   
[phone]  
   
                                                                  [facility]   
Phone:   
[phone]  
   
                                                    CBC auto differentialOrdered  
 By: Malika Shepherd on 2021   
   
                      Absolute Eos # 0.18                             [facility]   
Phone:   
[phone]  
   
                                                    Absolute Immature   
Granulocyte     0.06                                            [employer]   
Phone:   
[phone]  
   
                      Absolute Lymph # 2.41                             [employer]   
Phone:   
[phone]  
   
                      Absolute Mono # 0.83                             [employer]   
Phone:   
[phone]  
   
                      Basophils (Bld) [#/Vol] 0.07 10*3/uL                        
 [employer]   
Phone:   
[phone]  
   
                      Basophils/100 WBC (Bld) 1 %                   0 - 2 %    [facility]  
[facility]   
Phone:   
[phone]  
   
                      Differential Type NOT REPORTED                       [employer]   
Phone:   
[phone]  
   
                                                    Eosinophils/100 WBC   
(Bld)           1 %                             1 - 4 %         [facility]   
Phone:   
[phone]  
   
                                                    Hematocrit (Bld)   
[Volume fraction]   45.1 %                                  36.3 - 47.1   
%                                       [facility]   
Phone:   
[phone]  
   
                                                    Hemoglobin.gastrointest  
inal spec 1 Ql (Stl) 15.0 g/dL                               11.9 - 15.1   
g/dL                                    [facility]   
Phone:   
[phone]  
   
                                                    Immature   
granulocytes/100 WBC   
(Bld)           1 %             High            0               [facility]   
Phone:   
[phone]  
   
                                                    Interpretation and   
review of laboratory   
results         Abnormal                                        [facility]   
Phone:   
[phone]  
   
                                                    Lymphocytes/100 WBC   
(Bld)           18 %            Low             24 - 43 %       [facility]   
Phone:   
[phone]  
   
                                                    MCH (RBC) [Entitic   
mass]               28.4 pg                                 25.2 - 33.5   
pg                                      [facility]   
Phone:   
[phone]  
   
                          MCHC (RBC) [Mass/Vol] 33.3 g/dL                 28.4 -  
 34.8   
g/dL                                    [facility]   
Phone:   
[phone]  
   
                          MCV (RBC) [Entitic vol] 85.4 fL                   82.6  
 -   
102.9 fL                                [facility]   
Phone:   
[phone]  
   
                      Monocytes/100 WBC (Bld) 6 %                   3 - 12 %   [facility]  
[facility]   
Phone:   
[phone]  
   
                          NRBC Automated 0.0                       0.0 per 100   
WBC                                     [facility]   
Phone:   
[phone]  
   
                                                    Platelet distribution   
width (Bld) [Ratio] 13.0 %                                  11.8 - 14.4   
%                                       [facility]   
Phone:   
[phone]  
   
                      Platelet Estimate NOT REPORTED                       [facility]   
Phone:   
[phone]  
   
                                                    Platelet mean volume   
(Bld) [Entitic vol] 8.3 fL                                  8.1 - 13.5   
fL                                      [facility]   
Phone:   
[phone]  
   
                      Platelets (Bld) [#/Vol] 301 10*3/uL                         
[facility]   
Phone:   
[phone]  
   
                          RBC (Bld) [#/Vol] 5.28 10*6/uL High         3.95 - 5.1  
1   
m/uL                                    [facility]   
Phone:   
[phone]  
   
                      RBC (Bld) [#/Vol] NOT REPORTED                       [facility]   
Phone:   
[phone]  
   
                                                    Segmented   
neutrophils/100 WBC   
(Bld)           73 %            High            36 - 65 %       [facility]   
Phone:   
[phone]  
   
                      Segs Absolute 9.71       High                  [facility]  
Work   
Phone:   
[phone]  
   
                      WBC (Bld) [#/Vol] 13.3 10*3/uL High                  [facility]  
Work   
Phone:   
[phone]  
   
                      WBC (Bld) [#/Vol] NOT REPORTED                       [facility]  
[employer]   
Phone:   
[phone]  
   
                                                                  [facility]   
[employer]   
Phone:   
[phone]  
   
                                                    CBC with Diffon 2021   
   
                      Abs. Basophil 0.07 k/uL  Normal     0.00-0.20  [facility]  
   
                                        Comment on above:   Performed By: #### C  
DP, SALI, CP, HCG ####  
[address] [place] [address] [phone]  
: Haresh Zimmer MD   
   
                      Abs.Imm.Granulocyte 0.06 k/uL  Normal     0.00-0.30  [facility]  
   
                                        Comment on above:   Performed By: #### C  
DP, SALI, CP, HCG ####  
[facility]  
[address] [place], [place] [address]  
[phone]  
: Haresh Zimmer MD   
   
                      Abs.Neutrophil (Seg) 9.71 k/uL  High       1.50-8.10  [facility]  
   
                                        Comment on above:   Performed By: #### C  
DP, SALI, CP, HCG ####  
[address] [place], [facility] [phone]  
: Haresh Zimmer MD   
   
                      Basophils/100 WBC (Bld) 1 %        Normal     0-2        [facility]  
   
                                        Comment on above:   Performed By: #### C  
DP, SALI, CP, HCG ####  
[facility]  
[address] [place], [place] [address]  
[phone]  
: Haresh Zimmer MD   
   
                                                    Eosinophils (Bld)   
[#/Vol]         0.18 10*3/uL    Normal          0.00-0.44       [facility]  
   
                                        Comment on above:   Performed By: #### C  
DP, SALI, CP, HCG ####  
[facility]  
[address] [place] [address] [place] [address] [phone]  
: Haresh Zimmer MD   
   
                                                    Eosinophils/100 WBC   
(Bld)           1 %             Normal          1-4             [facility]  
   
                                        Comment on above:   Performed By: #### C  
DP, SALI, CP, HCG ####  
[address] [place] [address]  
[phone]  
: Haresh Zimmer MD   
   
                                                    Erythrocyte   
distribution width   
(RBC) [Ratio]   13.0 %          Normal          11.8-14.4       [facility]  
   
                                        Comment on above:   Performed By: #### C  
DP, SALI, CP, HCG ####  
[address] [place] [address]  
[phone]  
: Haresh Zimmer MD   
   
                                                    Hematocrit (Bld)   
[Volume fraction] 45.1 %          Normal          36.3-47.1       [facility]  
   
                                        Comment on above:   Performed By: #### C  
DP, SALI, CP, HCG ####  
[address] [place] [address]  
[phone]  
: Haresh Zimmer MD   
   
                                                    Hemoglobin (Bld)   
[Mass/Vol]      15.0 g/dL       Normal          11.9-15.1       [facility]  
   
                                        Comment on above:   Performed By: #### C  
DP, SALI, CP, HCG ####  
[address] [place] [place] [id number] [phone]  
: Haresh Zimmer MD   
   
                                                    Immature   
granulocytes/100 WBC   
(Bld)           1 %             High            0               [facility]  
   
                                        Comment on above:   Performed By: #### C  
DP, SALI, CP, HCG ####  
[address] [place] [id number] [phone]  
: Haresh Zimmer MD   
   
                                                    Lymphocytes (Bld)   
[#/Vol]         2.41 10*3/uL    Normal          1.10-3.70       [facility]  
   
                                        Comment on above:   Performed By: #### C  
DP, SALI, CP, HCG ####  
[address] [place] [place] [address]  
[phone]  
: Haresh Zimmer MD   
   
                                                    Lymphocytes/100 WBC   
(Bld)           18 %            Low             24-43           [facility]  
   
                                        Comment on above:   Performed By: #### C  
DP, SALI, CP, HCG ####  
[facility]  
[address] [place] [id number]  
[phone]  
: Haresh Zimmer MD   
   
                                                    MCH (RBC) [Entitic   
mass]           28.4 pg         Normal          25.2-33.5       [facility]  
   
                                        Comment on above:   Performed By: #### C  
DP, SALI, CP, HCG ####  
[address] [place] [address]  
[phone]  
: Haresh Zimmer MD   
   
                      MCHC (RBC) [Mass/Vol] 33.3 g/dL  Normal     28.4-34.8  [facility]  
   
                                        Comment on above:   Performed By: #### C  
DP, SALI, CP, HCG ####  
[address] [place] [address]  
[phone]  
: Haresh Zimmer MD   
   
                      MCV (RBC) [Entitic vol] 85.4 fL    Normal     82.6-102.9 [facility]  
   
                                        Comment on above:   Performed By: #### C  
DP, SALI, CP, HCG ####  
[address] [place] [address]  
[phone]  
: Haresh Zimmer MD   
   
                      Monocytes (Bld) [#/Vol] 0.83 10*3/uL Normal     0.10-1.20   
 [facility]  
   
                                        Comment on above:   Performed By: #### C  
DP, SALI, CP, HCG ####  
[address] [place] [address]  
[phone]  
: Haresh Zimmer MD   
   
                      Monocytes/100 WBC (Bld) 6 %        Normal     3-12       M  
[facility]  
   
                                        Comment on above:   Performed By: #### C  
DP, SALI, CP, HCG ####  
[address] [place] [address]  
[phone]  
: Haresh Zimmer MD   
   
                      Neutrophil (Seg) 73 %       High       36-65      [facility]  
   
                                        Comment on above:   Performed By: #### C  
DP, SALI, CP, HCG ####  
[facility]  
[address] [place], [place] [address] [phone]  
: Haresh Zimmer MD   
   
                      NRBC Automated 0.0 per 100 WBC Normal     0.0        [facility]  
   
                                        Comment on above:   Performed By: #### C  
DP, SALI, CP, HCG ####  
[facility]  
[address] [place] [address]  
[phone]  
: Haresh Zimmer MD   
   
                                                    Platelet mean volume   
(Bld) [Entitic vol] 8.3 fL          Normal          8.1-13.5        [facility]  
   
                                        Comment on above:   Performed By: #### C  
DPWEN, CP, HCG ####  
[facility]  
[address] [place] [address]  
[phone]  
: Haresh Zimmer MD   
   
                      Platelets (Bld) [#/Vol] 301 10*3/uL Normal     138-453      
[facility]  
   
                                        Comment on above:   Performed By: #### C  
DPWEN, CP, HCG ####  
[address] [place], [place] [address] [phone]  
: Haresh Zimmer MD   
   
                      RBC (Bld) [#/Vol] 5.28 10*6/uL High       3.95-5.11  [facility]  
   
                                        Comment on above:   Performed By: #### C  
DP, SALI, CP, HCG ####  
[facility]  
[address] [place], [place] [address] [phone]  
: Haresh Zimmer MD   
   
                      WBC (Bld) [#/Vol] 13.3 10*3/uL High       3.5-11.3   [facility]  
   
                                        Comment on above:   Performed By: #### C  
DP, SALI, CP, HCG ####  
[address] [place] [address]  
[phone]  
: Haresh Zimmer MD   
   
                      Auto Diff Performed NOT REPORTED Normal                [facility]  
   
                                        Comment on above:   Performed By: #### C  
DP, SALI, CP, HCG ####  
[facility]  
[address] [place] [address] [place] [address]  
[phone]  
: Haresh Zimmer MD   
   
                      Platelet Estimate NOT REPORTED Normal                [facility]  
   
                                        Comment on above:   Performed By: #### C  
DP, SALI, CP, HCG ####  
[facility]  
[address] [place] [address] [place] [address]  
[phone]  
: Haresh Zimmer MD   
   
                                                    RBC morphology finding   
Nom (Bld)       NOT REPORTED    Normal                          [facility]  
   
                                        Comment on above:   Performed By: #### C  
DP, SALI, CP, HCG ####  
[address] [place] [address]  
[phone]  
: Haresh Zimmer MD   
   
                      WBC Morphology NOT REPORTED Normal                [facility]  
   
                                        Comment on above:   Performed By: #### C  
DP, SALI, CP, HCG ####  
[facility]  
[address] [place] [address] [place] [address]  
[phone]  
: Haresh Zimmer MD   
   
                                                    COVID-19, RapidOrdered By: SIMONE Shepherd on 2021   
   
                                                    SARS-CoV-2 (COVID-19)   
RNA PAMELA+probe Ql (Unsp   
spec)               Not detected                            Not   
Detected                                [facility]  
Work   
Phone:   
[phone]  
   
                                        Comment on above:     
Rapid NAAT: The specimen is NEGATIVE for SARS-CoV-2, the novel   
coronavirus associated with  
COVID-19.  
  
The ID NOW COVID-19 assay is designed to detect the virus that   
causes COVID-19 in patients  
with signs and symptoms of infection who are suspected of   
COVID-19.  
An individual without symptoms of COVID-19 and who is not   
shedding SARS-CoV-2 virus would  
expect to have a negative (not detected) result in this assay.  
Negative results should be treated as presumptive and, if   
inconsistent with clinical signs  
and symptoms or necessary for patient management,  
should be tested with an alternative molecular assay. Negative   
results do not preclude  
SARS-CoV-2 infection and  
should not be used as the sole basis for patient management   
decisions.  
  
Fact sheet for Healthcare Providers:   
https://www.fda.gov/media/460288/download  
Fact sheet for Patients:   
https://www.fda.gov/media/697224/download  
  
Methodology: Isothermal Nucleic Acid Amplification  
  
   
   
                      Specimen Description .NASOPHARYNGEAL SWAB                   
      [facility]  
Work   
Phone:   
[phone]  
   
                                                                  [facility]  
Work   
Phone:   
[phone]  
   
                                                    Comp Metabolic Profon 2021   
   
                      (cont.)               Normal                [facility]  
   
                                        Comment on above:   Result Comment: Aver  
age GFR for 20-29 years old:  
116 mL/min/1.73sq m  
Chronic Kidney Disease:  
<60 mL/min/1.73sq m  
Kidney failure:  
<15 mL/min/1.73sq m  
eGFR calculated using average adult body mass. Additional eGFR   
calculator  
available at:  
http://www.Segment/multiple_crcl_.htm   
   
                                                            Performed By: #### C  
DP, SALI, CP, HCG ####  
[facility]  
[address] [place] [address] [phone]  
: Haresh Zimmer MD   
   
                      Albumin [Mass/Vol] 4.4 g/dL   Normal     3.5-5.2    [facility]  
   
                                        Comment on above:   Performed By: #### C  
DP, SALI, CP, HCG ####  
[facility]  
[address] [place] [address] [phone]  
: Haresh Zimmer MD   
   
                      Albumin/Glob Ratio 1.3        Normal     1.0-2.5    [facility]  
   
                                        Comment on above:   Performed By: #### C  
DP, SALI, CP, HCG ####  
[facility]  
[address] [place] [address] [phone]  
: Haresh Zimmer MD   
   
                      Alkaline Phos 82 U/L     Normal          [facility]  
   
                                        Comment on above:   Performed By: #### C  
DP, SALI, CP, HCG ####  
[facility]  
[address] [place] [address] [phone]  
: Haresh Zimmer MD   
   
                                                    ALT [Catalytic   
activity/Vol]   40 U/L          High            5-33            [facility]  
   
                                        Comment on above:   Performed By: #### C  
DP, SALI, CP, HCG ####  
[facility]  
[address] [place], [place] [address] [phone]  
: Haresh Zimmer MD   
   
                      Anion gap [Moles/Vol] 13 mmol/L  Normal     9-17       [facility]  
   
                                        Comment on above:   Performed By: #### C  
DP, SALI, CP, HCG ####  
[facility]  
[address] [place] [address] [place] [place], [place] 7[address]83 [phone]  
: Haresh Zimmer MD   
   
                                                    AST [Catalytic   
activity/Vol]   26 U/L          Normal          <32             [facility]  
   
                                        Comment on above:   Performed By: #### C  
DP, SALI, CP, HCG ####  
[facility]  
[address] [place] [address] [place] [place], [place] [address] [phone]  
: Haresh Zimmer MD   
   
                      Bilirubin [Mass/Vol] 0.39 mg/dL Normal     0.3-1.2    [facility]  
   
                                        Comment on above:   Performed By: #### C  
DP, SALI, CP, HCG ####  
[facility]  
[address] [place] [address] [place], [place] [address] [phone]  
: Haresh Zimmer MD   
   
                      BUN/CRE Ratio 12         Normal     9-20       [facility]  
   
                                        Comment on above:   Performed By: #### C  
DP, SALI, CP, HCG ####  
[facility]  
[address] [place], [place] [address] [phone]  
: Haresh Zimmer MD   
   
                      Calcium [Mass/Vol] 9.9 mg/dL  Normal     8.6-10.4   [facility]  
   
                                        Comment on above:   Performed By: #### C  
DP, SALI, CP, HCG ####  
[facility]  
[address] [place] [address] [place] [place], [place] [address] [phone]  
: Haresh Zimmer MD   
   
                      Chloride [Moles/Vol] 99 mmol/L  Normal          [facility]  
   
                                        Comment on above:   Performed By: #### C  
DP, SALI, CP, HCG ####  
[facility]  
[address] [place] [address]  
[phone]  
: Haresh Zimmer MD   
   
                      CO2 [Moles/Vol] 25 mmol/L  Normal     20-31      [facility]  
   
                                        Comment on above:   Performed By: #### C  
DP, SALI, CP, HCG ####  
[facility]  
[address] [place], [facility]  
[phone]  
: Haresh Zimmer MD   
   
                      Creatinine [Mass/Vol] 0.81 mg/dL Normal     0.50-0.90  [facility]  
   
                                        Comment on above:   Performed By: #### C  
DP, SALI, CP, HCG ####  
[address] [place], [facility]  
[phone]  
: Haresh Zimmer MD   
   
                      GFR, Amer >60        Normal     >60        [facility]  
   
                                        Comment on above:   Performed By: #### C  
DP, SALI, CP, HCG ####  
[address] [place], [facility]  
[phone]  
: Haresh Zimmer MD   
   
                      GFR,non  Amer >60        Normal     >60        [facility]  
   
                                        Comment on above:   Performed By: #### C  
DP, SALI, CP, HCG ####  
[address] [place], [facility]  
[phone]  
: Haresh Zimmer MD   
   
                      Glucose [Mass/Vol] 86 mg/dL   Normal     70-99      [facility]  
   
                                        Comment on above:   Performed By: #### C  
DP, SALI, CP, HCG ####  
[address] [place], [facility] [phone]  
: Haresh Zimmer MD   
   
                      Potassium [Moles/Vol] 3.7 mmol/L Normal     3.7-5.3    [facility]  
   
                                        Comment on above:   Performed By: #### C  
DP, SALI, CP, HCG ####  
[facility]  
[address] [place] [address] [phone]  
: Haresh Zimmer MD   
   
                      Protein [Mass/Vol] 7.8 g/dL   Normal     6.4-8.3    [facility]  
   
                                        Comment on above:   Performed By: #### C  
DP, SALI, CP, HCG ####  
[facility]  
[address] [place] [address] [phone]  
: Haresh Zimmer MD   
   
                      Sodium [Moles/Vol] 137 mmol/L Normal     135-144    [facility]  
   
                                        Comment on above:   Performed By: #### C  
DP, SALI, CP, HCG ####  
[address] [place] [address] [phone]  
: Haresh Zimmer MD   
   
                      Staging:              Normal                [facility]  
   
                                        Comment on above:   Result Comment: Stag  
e 1: Some kidney damage normal GFR  
Stage 2: Mild kidney damage GFR 60-89  
Stage 3: Moderate kidney damage GFR 30-59  
Stage 4: Severe kidney damage GFR 15-29  
Stage 5: Severe kidney damage GFR <15  
ESRD - chronic treatment by dialysis or transplant   
   
                                                            Performed By: #### C  
DP, SALI, CP, HCG ####  
[address] [place] [address] [phone]  
: Haresh Zimmer MD   
   
                                                    Urea nitrogen   
[Mass/Vol]      10 mg/dL        Normal          6-20            [facility]  
   
                                        Comment on above:   Performed By: #### C  
DP, SALI, CP, HCG ####  
[address] [place] [address] [phone]  
: Haresh Zimmer MD   
   
                                                    Comprehensive Metabolic Pane  
lOrdered By: Malika Shepherd on 2021   
   
                          Albumin [Mass/Vol] 4.4 g/dL                  3.5 - 5.2  
   
g/dL                                    [facility]  
Work   
Phone:   
[phone]  
   
                                                    Albumin/Globulin [Mass   
ratio]          1.3 {ratio}                                     [facility]  
Work   
Phone:   
[phone]  
   
                                                    ALP (Bld) [Catalytic   
activity/Vol]       82 U/L                                  35 - 104   
U/L                                     [facility]   
Phone:   
[phone]  
   
                                                    ALT [Catalytic   
activity/Vol]   40 U/L          High            5 - 33 U/L      [facility]   
Phone:   
[phone]  
   
                          Anion gap [Moles/Vol] 13 mmol/L                 9 - 17  
   
mmol/L                                  [facility]   
Phone:   
[phone]  
   
                                                    AST [Catalytic   
activity/Vol]   26 U/L                          <32             [facility]   
Phone:   
[phone]  
   
                          Bilirubin [Mass/Vol] 0.39 mg/dL                0.3 - 1  
.2   
mg/dL                                   [facility]   
Phone:   
[phone]  
   
                          Calcium [Mass/Vol] 9.9 mg/dL                 8.6 - 10.  
4   
mg/dL                                   [facility]   
Phone:   
[phone]  
   
                          Chloride [Moles/Vol] 99 mmol/L                 98 - 10  
7   
mmol/L                                  [facility]   
Phone:   
[phone]  
   
                          CO2 [Moles/Vol] 25 mmol/L                 20 - 31   
mmol/L                                  [facility]   
Phone:   
[phone]  
   
                          Creatinine [Mass/Vol] 0.81 mg/dL                0.50 -  
 0.90   
mg/dL                                   [facility]   
Phone:   
[phone]  
   
                                                    Free PSA/Total PSA   
[Mass fraction]     7.8 g/dL                                6.4 - 8.3   
g/dL                                    [facility]   
Phone:   
[phone]  
   
                      GFR  >60                   >60 mL/min [facility]  
y   
Health  
Work   
Phone:   
[phone]  
   
                                                    GFR Non-   
American        >60                             >60 mL/min      [facility]   
Phone:   
[phone]  
   
                          Glucose [Mass/Vol] 86 mg/dL                  70 - 99   
mg/dL                                   [facility]   
Phone:   
[phone]  
   
                          Potassium [Moles/Vol] 3.7 mmol/L                3.7 -   
5.3   
mmol/L                                  [facility]   
Phone:   
[phone]  
   
                          Sodium [Moles/Vol] 137 mmol/L                135 - 144  
   
mmol/L                                  [facility]   
Phone:   
[phone]  
   
                                                    Urea nitrogen (BldV)   
[Mass/Vol]          10 mg/dL                                6 - 20   
mg/dL                                   [facility]  
Work   
Phone:   
[phone]  
   
                                                    Urea   
nitrogen/Creatinine   
(Bld) [Mass ratio] 12                                              [facility]  
Work   
Phone:   
[phone]  
   
                                                    Drug Scr, Abuse, Uron 2021   
   
                      Amphetamine(s),Ur Negative   Normal     [facility]  
   
                                        Comment on above:   Performed By: #### C  
OVRB ####  
[facility]  
[address] [place], [place] [address] [phone]  
: Haresh Zimmer MD   
   
                      Barbiturate(s),Ur Negative   Normal     NEG        [facility]  
   
                                        Comment on above:   Performed By: #### C  
OVRB ####  
[facility]  
[address] [place], [place] [address] [phone]  
: Haresh Zimmer MD   
   
                      Benzodiazepine(s) Negative   Normal     [facility]  
   
                                        Comment on above:   Performed By: #### C  
OVRB ####  
[facility]  
[address] [place] [address]  
[phone]  
: Haresh Zimmer MD   
   
                      Buprenorphrine, Ur Negative   Normal     [facility]  
   
                                        Comment on above:   Performed By: #### C  
OVRB ####  
[facility]  
[address] [place] [address] [place] [address]  
[phone]  
: Haresh Zimmer MD   
   
                      Cannabinoid(s),Ur Positive   Abnormal   NEG        [facility]  
   
                                        Comment on above:   Performed By: #### C  
OVRB ####  
[facility]  
[address] [place] [address]  
[phone]  
: Haresh Zimmer MD   
   
                      Cocaine Metabolite Negative   Normal     [facility]  
   
                                        Comment on above:   Performed By: #### C  
OVRB ####  
[facility]  
[address] [place], [place] [address] [phone]  
: Haresh Zimmer MD   
   
                      Methadone Ql (U) Negative   Normal     [facility]  
   
                                        Comment on above:   Performed By: #### C  
OVRB ####  
[facility]  
[address] [place], [place] [address]83 [phone]  
: Haresh Zimmer MD   
   
                      Methamphetamine, Ur Negative   Normal     NEG        [facility]  
   
                                        Comment on above:   Performed By: #### C  
OVRB ####  
[facility]  
[address] [place], [place] [id number] [phone]  
: Haresh Zimmer MD   
   
                      Opiate(s), Ur Negative   Normal     NEG        [facility]  
   
                                        Comment on above:   Performed By: #### C  
OVRB ####  
[facility]  
[address] [place], [place] [address] [phone]  
: Haresh Zimmer MD   
   
                      Oxycodone, Urine Negative   Normal     [facility]  
   
                                        Comment on above:   Performed By: #### C  
OVRB ####  
[facility]  
[address] [place], [place] [address] [phone]  
: Haresh Zimmer MD   
   
                      Phencyclidine, Ur Negative   Normal     NEG        [facility]  
   
                                        Comment on above:   Performed By: #### C  
OVRB ####  
[facility]  
[address] [place] [address] [place] [place], [place] [address] [phone]  
: Haresh Zimmer MD   
   
                      Propoxyphene,Urine Negative   Normal     [facility]  
   
                                        Comment on above:   Performed By: #### C  
OVRB ####  
[facility]  
[address] [place], [place] [address] [phone]  
: Haresh Zimmer MD   
   
                                                    Tricyclic   
antidepressants Screen   
Ql (U)          Negative        [facility]  
   
                                        Comment on above:   Result Comment: Drug  
 screen results are to be used for medical  
  
purposes only. All positive  
results are unconfirmed. Testing for employment or legal uses   
should be sent  
to a reference laboratory for confirmation.   
   
                                                            Performed By: #### C  
OVRB ####  
[facility]  
[address] [place], [place] [address] [phone]  
: Haresh Zimmer MD   
   
                      Interpretive Info NOT REPORTED Normal                [facility]  
   
                                        Comment on above:   Performed By: #### C  
OVRB ####  
[facility]  
[address] [place] [address] [phone]  
: Haresh Zimmer MD   
   
                      MDMA, Urine NOT REPORTED Normal     NEG        [facility]  
   
                                        Comment on above:   Performed By: #### C  
OVRB ####  
[facility]  
[address] [place] [address] [place] [address] [phone]  
: Haresh Zimmer MD   
   
                                                    EthanolOrdered By: Malika hernadez on 2021   
   
                      Ethanol [Mass/Vol] mg/dL                 <10 mg/dL  [employer]   
Phone:   
[phone]  
   
                      Ethanol percent <0.010                <0.010 %   [employer]   
Phone:   
[phone]  
   
                                                                  [employer]   
Phone:   
[phone]  
   
                                                    Ethanol Alcoholon 2021  
   
   
                      Ethanol [Mass/Vol] mg/dL      Normal     <10        [facility]  
   
                                        Comment on above:   Performed By: #### D  
AU ####  
[facility]  
[address] [place] [address] [place] [address] [phone]  
: Haresh Zimmer MD   
   
                      Ethanol percent <0.010     Normal     <0.010     [facility]  
   
                                        Comment on above:   Performed By: #### D  
AU ####  
[address] [place] [address] [phone]  
: Haresh Zimmer MD   
   
                                                    HCG Qualitative, SerumOrdere  
d By: Malika Shepherd on 2021   
   
                      hCG Qual   Negative              NEGATIVE   [facility]   
Phone:   
[phone]  
   
                                        Comment on above:   Specimens with hCG l  
evels near the threshold of the test (25   
mIU/mL) may give a negative or  
indeterminate result. In such cases, another test should be   
performed with a new specimen  
in 48-72 hours. If early pregnancy is suspected clinically in   
this setting, correlation  
with quantitative serum b-hCG level is suggested.  
  
[employer] has confirmed the use of plasma for this   
test. This has not been cleared  
or approved by the U.S. Food and Drug Administration. The FDA   
has determined that such  
clearance is not necessary.  
  
   
   
                                                                  [employer]   
Phone:   
[phone]  
   
                                                    HCG Screen, Bloodon 20   
   
                      HCG Screen, Blood Negative   Normal     NEG        [facility]  
   
                                        Comment on above:   Result Comment: Spec  
imens with hCG levels near the threshold   
of the test (25 mIU/mL) may give a  
negative or indeterminate result. In such cases, another test   
should be  
performed with a new specimen in 48-72 hours. If early   
pregnancy is suspected  
clinically in this setting, correlation with quantitative   
serum b-hCG level is  
suggested.  
[employer] has confirmed the use of plasma for this   
test. This has not  
been cleared or approved by the U.S. Food and Drug   
Administration. The FDA has  
determined that such clearance is not necessary.   
   
                                                            Performed By: #### C  
DP, SALI, CP, HCG ####  
[facility]  
[address] [place] [address]  
[phone]  
: Haresh Zimmer MD   
   
                                                    Laboratory - Chemistry and C  
hemistry - challengeOrdered By: Malika Shepherd on   
2021   
   
                                                    GFR/1.73 sq M.predicted   
MDRD (S/P/Bld) [Vol   
rate/Area]                                                      [employer]   
Phone:   
[phone]  
   
                                        Comment on above:   Average GFR for 20-2  
9 years old:  
116 mL/min/1.73sq m  
Chronic Kidney Disease:  
<60 mL/min/1.73sq m  
Kidney failure:  
<15 mL/min/1.73sq m  
  
  
eGFR calculated using average adult body mass. Additional eGFR   
calculator available at:  
  
http://www.[employer]/multiple_crcl_2012.htm  
  
  
   
   
                                                            Stage 1: Some kidney  
 damage normal GFR  
Stage 2: Mild kidney damage GFR 60-89  
Stage 3: Moderate kidney damage GFR 30-59  
Stage 4: Severe kidney damage GFR 15-29  
Stage 5: Severe kidney damage GFR <15  
ESRD - chronic treatment by dialysis or transplant  
  
  
   
   
                                                    Microscopic UrinalysisOrdere  
d By: Malika Shepherd on 2021   
   
                      -                                           [employer]   
Phone:   
[phone]  
   
                      Amorphous, UA NOT REPORTED            None       [employer]   
Phone:   
[phone]  
   
                      Bacteria, UA TRACE      Abnormal   None       [employer]   
Phone:   
[phone]  
   
                      Casts UA   NOT REPORTED            /LPF       [facility]  
Work   
Phone:   
[phone]  
   
                      Crystals, UA NOT REPORTED            None /HPF  [facility]  
Work   
Phone:   
[phone]  
   
                      Epithelial Cells UA 2 TO 5                           [facility]  
Work   
Phone:   
[phone]  
   
                                                    Interpretation and   
review of laboratory   
results         Abnormal                                        [employer]  
Work   
Phone:   
[phone]  
   
                      Mucus, UA  TRACE      Abnormal   None       [employer]  
Work   
Phone:   
[phone]  
   
                      Other Observations UA NOT REPORTED            NOT REQ.   M  
[facility]   
[employer]  
Work   
Phone:   
[phone]  
   
                      RBC, UA    0 TO 2                           [facility]  
Work   
Phone:   
[phone]  
   
                      Renal Epithelial, UA NOT REPORTED            0 /HPF     [facility]  
[facility]   
[employer]  
Work   
Phone:   
[phone]  
   
                      Trichomonas, UA NOT REPORTED            None       [employer]   
Phone:   
[phone]  
   
                      WBC, UA    0 TO 2                           [facility]  
Work   
Phone:   
[phone]  
   
                      Yeast, UA  NOT REPORTED            None       [employer]   
Phone:   
[phone]  
   
                                                                  [facility]  
Work   
Phone:   
[phone]  
   
                                                    No Panel InformationOrdered   
By: Malika Shepherd on 2021   
   
                                                    Interpretation and   
review of laboratory   
results         Abnormal                                        [employer]   
Phone:   
[phone]  
   
                                                                  [employer]   
Phone:   
[phone]  
   
                                                    SARS-CoV-2on 2021   
   
                                                    SARS-CoV-2 (COVID-19)   
RNA PAMELA+probe Ql (Unsp   
spec)           Not detected    Normal          [facility]  
   
                                        Comment on above:   Result Comment:  
Rapid NAAT: The specimen is NEGATIVE for SARS-CoV-2, the novel   
coronavirus  
associated with COVID-19.  
The ID NOW COVID-19 assay is designed to detect the virus that   
causes COVID-19  
in patients with signs and symptoms of infection who are   
suspected of COVID-19.  
An individual without symptoms of COVID-19 and who is not   
shedding SARS-CoV-2  
virus would expect to have a negative (not detected) result in   
this assay.  
Negative results should be treated as presumptive and, if   
inconsistent with  
clinical signs and symptoms or necessary for patient   
management,  
should be tested with an alternative molecular assay. Negative   
results do not  
preclude SARS-CoV-2 infection and  
should not be used as the sole basis for patient management   
decisions.  
Fact sheet for Healthcare Providers:   
https://www.fda.gov/media/128349/download  
Fact sheet for Patients:   
https://www.fda.gov/media/288633/download  
Methodology: Isothermal Nucleic Acid Amplification   
   
                                                            Performed By: #### C  
OVRB ####  
[facility]  
[address] [place] [address] [phone]  
: Haresh Zimmer MD   
   
                                                    Salicylateon 2021   
   
                      Salicylate <1         Low        3-10       [facility]  
   
                                        Comment on above:   Performed By: #### C  
DP, WEN, CP, HCG ####  
[address] [place] [address] [phone]  
: Haresh Zimmer MD   
   
                                                    SalicylateOrdered By: Malika matta on 2021   
   
                          Salicylate Lvl <1           Low          3 - 10   
mg/dL                                   [facility]  
Work   
Phone:   
[phone]  
   
                                                    UA w/Reflex Cultureon 2021   
   
                      Bilirubin, SemiQt,Ur Negative   Normal     NEG        [facility]  
   
                                        Comment on above:   Performed By: #### C  
OVRB ####  
[facility]  
[address] [place] [address] [place] [address] [phone]  
: Haresh Zimmer MD   
   
                      Blood, Urine Negative   Normal     NEG        [facility]  
   
                                        Comment on above:   Performed By: #### C  
OVRB ####  
[facility]  
[address] [place] [address] [phone]  
: Haresh Zimmer MD   
   
                      Clarity (U) CLEAR      Normal     CLEAR      [facility]  
   
                                        Comment on above:   Performed By: #### C  
OVRB ####  
[facility]  
[address] [place] [address] [phone]  
: Haresh Zimmer MD   
   
                      Color (U)  YELLOW     Normal     YEL        [facility]  
   
                                        Comment on above:   Performed By: #### C  
OVRB ####  
[facility]  
[address] [place], [place] [address] [phone]  
: Haresh Zimmer MD   
   
                      Glucose Ql (U) Negative   Normal     NEG        [facility]  
   
                                        Comment on above:   Performed By: #### C  
OVRB ####  
[facility]  
[address] [place], [place] [address] [phone]  
: Haresh Zimmer MD   
   
                      Ketones Ql (U) Negative   Normal     NEG        [facility]  
   
                                        Comment on above:   Performed By: #### C  
OVRB ####  
[facility]  
[address] [place], [place] [address] [phone]  
: Haresh Zimmer MD   
   
                                                    Leukocyte esterase Test   
strip Ql (U)    Negative        Normal          [facility]  
   
                                        Comment on above:   Performed By: #### C  
OVRB ####  
[facility]  
[address] [place] [address] [place] [place], [place] [address] [phone]  
: Haresh Zimmer MD   
   
                      Nitrite,Ur Negative   Normal     [facility]  
   
                                        Comment on above:   Performed By: #### C  
OVRB ####  
[facility]  
[address] [place] [address] [address] [place], [place] [address] [phone]  
: Haresh Zimmer MD   
   
                      PH,Ur      8.5        Normal     5.0-9.0    [facility]  
   
                                        Comment on above:   Performed By: #### C  
OVRB ####  
[facility]  
[address] [place] [address] [place] [place], [place] [address] [phone]  
: Haresh Zimmer MD   
   
                      Protein Ql (U) Negative   [facility]  
   
                                        Comment on above:   Performed By: #### C  
OVRB ####  
[facility]  
[address] [place], [place] [address] [phone]  
: Haresh Zimmer MD   
   
                      Spec. Gravity,Ur 1.015      Normal     1.010-1.020 [facility]  
   
                                        Comment on above:   Performed By: #### C  
OVRB ####  
[facility]  
[address] [place] [address] [phone]  
: Haresh Zimmer MD   
   
                      Urobilinogen,Ur Normal     Normal     NORM       [facility]  
   
                                        Comment on above:   Performed By: #### C  
OVRB ####  
[facility]  
[address] [place] [address] [phone]  
: Haresh Zimmer MD   
   
                      Comment    NOT REPORTED Normal                [facility]  
   
                                        Comment on above:   Performed By: #### C  
OVRB ####  
[facility]  
[address] [place] [address] [phone]  
: Haresh Zimmer MD   
   
                                                    Urinalysis Reflex to Culture  
Ordered By: Malika Shepherd on 2021   
   
                      Bilirubin Urine Negative              NEGATIVE   [facility]   
Phone:   
[phone]  
   
                      Color, UA  YELLOW                YELLOW     [facility]   
Phone:   
[phone]  
   
                      Glucose, Ur Negative              NEGATIVE   [facility]   
Phone:   
[phone]  
   
                      Ketones Ql (U) Negative              NEGATIVE   [facility]   
Phone:   
[phone]  
   
                                                    Leukocyte esterase Test   
strip Ql (U)    Negative                        NEGATIVE        [facility]   
Phone:   
[phone]  
   
                      Nitrite, Urine Negative              NEGATIVE   [facility]   
Phone:   
[phone]  
   
                      pH, UA     8.5                              [facility]   
[employer]   
Phone:   
[phone]  
   
                      Protein, UA Negative              NEGATIVE   [facility]   
Phone:   
[phone]  
   
                      Specific [place], UA 1.015                            [facility]  
[employer]  
Work   
Phone:   
[phone]  
   
                      Turbidity UA CLEAR                 CLEAR      [facility]  
Work   
Phone:   
[phone]  
   
                      Urinalysis Comments NOT REPORTED                       [facility]  
   
[employer]  
Work   
Phone:   
[phone]  
   
                      Urine Hgb  Negative              NEGATIVE   [facility]   
[employer]   
Phone:   
[phone]  
   
                      Urobilinogen, Urine Normal                Normal     [facility]  
   
[employer]   
Phone:   
[phone]  
   
                                                                  [facility]   
[employer]  
Work   
Phone:   
[phone]  
   
                                                    Urinalysis,Microon   
1   
   
                      -----                 Normal                [facility]  
   
                                        Comment on above:   Performed By: #### C  
OVRB ####  
[facility]  
[address] [place], [place] [address] [phone]  
: Haresh Zimmer MD   
   
                      Bacteria   TRACE      Abnormal   [facility]  
   
                                        Comment on above:   Performed By: #### C  
OVRB ####  
[facility]  
[address] [place], [place] [address] [phone]  
: Haresh Zimmer MD   
   
                                                    Epithelial cells LM Ql   
(Urine sed)     2 TO 5          Normal          0-25            [facility]  
   
                                        Comment on above:   Performed By: #### C  
OVRB ####  
[facility]  
[address] [place] [address] [phone]  
: Haresh Zimmer MD   
   
                      Mucus Strands TRACE      Abnormal   [facility]  
   
                                        Comment on above:   Performed By: #### C  
OVRB ####  
[facility]  
[address] [place] [address] [place] [address] [phone]  
: Haresh Zimmer MD   
   
                      Urine RBC's 0 TO 2     Normal     0-2        [facility]  
   
                                        Comment on above:   Performed By: #### C  
OVRB ####  
[address] [place] [address] [phone]  
: Haresh Zimmer MD   
   
                      Urine WBC's 0 TO 2     Normal     0-5        [facility]  
   
                                        Comment on above:   Performed By: #### C  
OVRB ####  
[facility]  
[address] [place] [address] [phone]  
: Haresh Zimmer MD   
   
                                                    Amorphous sediment LM   
Ql (Urine sed)  NOT REPORTED    Normal          [facility]  
   
                                        Comment on above:   Performed By: #### C  
OVRB ####  
[address] [place] [address] [phone]  
: Haresh Zimmer MD   
   
                      Casts      NOT REPORTED Normal                [facility]  
   
                                        Comment on above:   Performed By: #### C  
OVRB ####  
[facility]  
[address] [place] [address] [place] [place], [place] [address] [phone]  
: Haresh Zimmer MD   
   
                                                    Crystals LM Nom (Urine   
sed)            NOT REPORTED    Normal          [facility]  
   
                                        Comment on above:   Performed By: #### C  
OVRB ####  
[facility]  
[address] [place], [place] [address] [phone]  
: Haresh Zimmer MD   
   
                      Epithelial, Renal NOT REPORTED Normal     0          [facility]  
   
                                        Comment on above:   Performed By: #### C  
OVRB ####  
[facility]  
[address] [place], [place] [address] [phone]  
: Haresh Zimmer MD   
   
                      Other Observations NOT REPORTED Normal     NREQ       [facility]  
   
                                        Comment on above:   Performed By: #### C  
OVRB ####  
[facility]  
[address] [place], [place] [address] [phone]  
: Haresh Zimmer MD   
   
                      Trichomonas NOT REPORTED Normal     [facility]  
   
                                        Comment on above:   Performed By: #### C  
OVRB ####  
[facility]  
[address] [place], [place] [address] [phone]  
: Haresh Zimmer MD   
   
                      Yeast      NOT REPORTED Normal     [facility]  
   
                                        Comment on above:   Performed By: #### C  
OVRB ####  
[facility]  
[address] [place], [place] [address] [phone]  
: Haresh Zimmer MD   
   
                                                    Urine Drug ScreenOrdered By:  
 Malika Shepherd on 2021   
   
                      Amphetamine Screen, Ur Negative              NEGATIVE   [facility]   
[employer]  
Work   
Phone:   
[phone]  
   
                      Barbiturate Screen, Ur Negative              NEGATIVE   [facility]   
[employer]  
Work   
Phone:   
[phone]  
   
                                                    Benzodiazepine Screen,   
Urine           Negative                        NEGATIVE        [facility]   
Phone:   
[phone]  
   
                      Buprenorphine Urine Negative              NEGATIVE   [facility]   
Phone:   
[phone]  
   
                      Cannabinoid Scrn, Ur Positive   Abnormal   NEGATIVE   [facility]   
Health  
[facility]   
Phone:   
[phone]  
   
                                                    Cocaine Metabolite,   
Urine           Negative                        NEGATIVE        [facility]   
Phone:   
[phone]  
   
                                                    Interpretation and   
review of laboratory   
results         Abnormal                                        [employer]  
Work   
Phone:   
[phone]  
   
                      MDMA, Urine NOT REPORTED            NEGATIVE   [facility]  
Work   
Phone:   
[phone]  
   
                      Methadone Screen, Urine Negative              NEGATIVE   [facility]  
[facility]   
[employer]  
Work   
Phone:   
[phone]  
   
                      Methamphetamine, Urine Negative              NEGATIVE   [facility]  
y   
[employer]  
Work   
Phone:   
[phone]  
   
                      Opiates, Urine Negative              NEGATIVE   [facility]   
[employer]  
Work   
Phone:   
[phone]  
   
                      Oxycodone Screen, Ur Negative              NEGATIVE   [facility]  
[facility]   
Health  
Work   
Phone:   
[phone]  
   
                      Phencyclidine, Urine Negative              NEGATIVE   [facility]  
[facility]   
Health  
Work   
Phone:   
[phone]  
   
                      Propoxyphene, Urine Negative              NEGATIVE   [facility]  
   
[employer]  
Work   
Phone:   
[phone]  
   
                      Test Information NOT REPORTED                       [facility]   
Phone:   
[phone]  
   
                                                    Tricyclic   
Antidepressants, Urine Negative                        NEGATIVE        [facility]   
[employer]  
Work   
Phone:   
[phone]  
   
                                        Comment on above:   Drug screen results   
are to be used for medical purposes only.   
All positive results are  
unconfirmed. Testing for employment or legal uses should be   
sent to a reference laboratory  
for confirmation.  
  
   
   
                                                                  [employer]   
Phone:   
[phone]  
   
                                                    Laboratory - Microbiology an  
d Antimicrobial susceptibilityOrdered By: Doreen Cabrera on   
2021   
   
                                                    SARS-CoV-2 (COVID-19)   
RNA PAMELA+probe Ql (Resp) Not detected                            (Not   
Detected )                              Health   
[facility]  
Work   
Phone:   
[phone]  
   
                                        Comment on above:   Note: This nucleic a  
mallorie amplification test was developed and   
itsperformance characteristics determined by   
[facility]. Nucleic acid amplification tests include   
RT-PCR and TMA. This test has not been FDA cleared orapproved.   
This test has been authorized by FDA under anEmergency Use   
Authorization (EUA). This test is onlyauthorized for the   
duration of time the declaration thatcircumstances exist   
justifying the authorization of theemergency use of in vitro   
diagnostic tests for detection [id number] virus and/or   
diagnosis of COVID-19 infectionunder section 564(b)(1) of the   
Act, 21 U.S.C. 360bbb-3(b)(1), unless the authorization is   
terminated or revokedsooner.When diagnostic testing is   
negative, the possibility of afalse negative result should be   
considered in the contextof a patient's recent exposures and   
the presence ofclinical signs and symptoms consistent with   
COVID-19. Anindividual without symptoms of COVID-19 and who is   
notshedding SARS-CoV-2 virus would expect to have a   
negative(not detected) result in this assay.   
   
                                                    SARS-CoV-2 (COVID-19)   
RNA PAMELA+probe Ql (Unsp   
spec)           Performed                                       Health   
[facility]  
Work   
Phone:   
[phone]  
   
                                                    Vitamin Don 2021   
   
                      Vitamin D  14.7 ng/mL Low        30.0-100.0 [facility]  
   
                                        Comment on above:   Result Comment: (<20  
 ng/mL) Deficiency  
This assay accurately quantifies the sum of vitamin D3,   
25-Hydroxy and  
vitamin D2, 25-Hyroxy.   
   
                                                            Performed By: #### V  
ITD ####  
[facility]  
[address] [place] [address]  
[phone]   
   
                                                    CBC With Platelet No Differe  
ntialon 2021   
   
                                                    Erythrocyte   
distribution width   
(RBC) [Ratio]   13.6 %          Normal          11.5-14.5       [facility]  
   
                                        Comment on above:   Performed By: #### C  
BCND ####  
[facility]  
[address] [place] [place] [address]  
[phone]   
   
                                                    Hematocrit (Bld)   
[Volume fraction] 43.5 %          Normal          37.0-47.0       [facility]  
   
                                        Comment on above:   Performed By: #### C  
BCND ####  
[facility]  
[address] [place] [place] [address]  
[phone]   
   
                                                    Hemoglobin (Bld)   
[Mass/Vol]      14.6 g/dL       Normal          12.0-16.0       [facility]  
   
                                        Comment on above:   Performed By: #### C  
BCND ####  
[facility]  
[address]  
[place] [address]  
[phone]   
   
                                                    MCH (RBC) [Entitic   
mass]           28.6 pg         Normal          27.0-31.3       [facility]  
   
                                        Comment on above:   Performed By: #### C  
BCND ####  
[facility]  
[address] [place] [address]  
[phone]   
   
                      MCHC       33.5 %     Normal     33.0-37.0  [facility]  
   
                                        Comment on above:   Performed By: #### C  
BCND ####  
[facility]  
[address] [place] [address]  
[phone]   
   
                      MCV (RBC) [Entitic vol] 85.4 fL    Normal     82.0-100.0 M  
[facility]  
   
                                        Comment on above:   Performed By: #### C  
BCND ####  
[facility]  
[address] [place] [address]  
[phone]   
   
                      Platelets (Bld) [#/Vol] 284 10*3/uL Normal     130-400      
[facility]  
   
                                        Comment on above:   Performed By: #### C  
BCND ####  
[facility]  
[address] [place] [address]  
[phone]   
   
                      RBC (Bld) [#/Vol] 5.10 10*6/uL Normal     4.20-5.40  [facility]  
   
                                        Comment on above:   Performed By: #### C  
BCND ####  
[facility]  
[address] [place] [address]  
[phone]   
   
                      WBC (Bld) [#/Vol] 11.1 10*3/uL Critically high 4.8-10.8     
[facility]  
   
                                        Comment on above:   Performed By: #### C  
BCND ####  
[facility]  
[address] [place] [address]  
[phone]   
   
                                                    Folateon 2021   
   
                      Folate     15.3 ng/mL Normal     7.3-26.1   [facility]  
   
                                        Comment on above:   Result Comment: As o  
f 8/10/16, the methodology has changed.   
Results from  
this methodology should not be compared with results from  
previous methodology.   
   
                                                            Performed By: #### F  
OLAT ####  
[facility]  
[address] [place] [address]  
[phone]   
   
                                                    Lipid Panelon 2021   
   
                      Cholesterol [Mass/Vol] 127 mg/dL  Normal     0-199      [facility]  
   
                                        Comment on above:   Result Comment: ATP   
III Cholesterol classification is   
Desirable.   
   
                                                            Performed By: #### L  
IPID ####  
[facility]  
[address] [place] [address]  
[phone]   
   
                                                    Cholesterol in HDL   
[Mass/Vol]      32 mg/dL        Low             40-59           [facility]  
   
                                        Comment on above:   Result Comment: ATP   
III HDL Cholestrol Classification is low.  
Expected Values:  
Males: >55 = No Risk  
35-55 = Moderate Risk  
<35 = High Risk  
Females: >65 = No Risk  
45-65 = Moderate Risk  
<45 = High Risk  
NCEP Guidelines: Third Report May 2001  
>59 = negative risk factor for CHD  
<40 = major risk factor for CHD   
   
                                                            Performed By: #### L  
IPID ####  
[facility]  
[address] [place] [address]  
[phone]   
   
                                                    Cholesterol in LDL   
[Mass/Vol]      75 mg/dL        Normal          0-129           [facility]  
   
                                        Comment on above:   Result Comment: ATP   
III LDL Classification is Optimal.   
   
                                                            Performed By: #### L  
IPID ####  
[facility]  
[address] [place] [address]  
[phone]   
   
                      Triglyceride [Mass/Vol] 100 mg/dL  Normal     0-150      M  
[facility]  
   
                                        Comment on above:   Result Comment: ATP   
III Triglycerides Classification is   
Normal.   
   
                                                            Performed By: #### L  
IPID ####  
[facility]  
[address] [place] [address]  
[phone]   
   
                                                    TSH w/out Reflexon    
   
                      TSH w/out Reflex 1.020 uIU/mL Normal     0.440-3.86 [facility]  
   
                                        Comment on above:   Performed By: #### T  
SH ####  
[facility]  
[address] [place] [address]  
[phone]   
   
                                                    Vitamin B12on 2021   
   
                                                    Cobalamin (Vitamin B12)   
[Mass/Vol]      1050 pg/mL      Normal          232-1245        [facility]  
   
                                        Comment on above:   Performed By: #### B  
12 ####  
[facility]  
[address] [place] [address]  
[phone]   
   
                                                    EKG 12 LeadOrdered By: Cuca Gallagher on 2021   
   
                      Atrial Rate 75                    BPM        [facility]   
[employer]   
Phone:   
[phone]  
   
                      P Axis     24                    degrees    [employer]   
Phone:   
[phone]  
   
                      P-R Interval 140 ms                           [employer]   
Phone:   
[phone]  
   
                      Q-T Interval 384 ms                           [employer]   
Phone:   
[phone]  
   
                      QRS Duration 90 ms                            [employer]   
Phone:   
[phone]  
   
                                                    QTc Calculation   
(Bazett)        414 ms                                          [employer]   
Phone:   
[phone]  
   
                      R Axis     29                    degrees    [employer]   
Phone:   
[phone]  
   
                      T Axis     20                    degrees    [employer]   
Phone:   
[phone]  
   
                      Ventricular Rate 70                    BPM        [employer]   
Phone:   
[phone]  
   
                                                            Normal sinus rhythm   
with   
sinus arrhythmia  
Possible Anterior infarct   
, age undetermined  
Abnormal ECG  
No previous ECGs   
available  
Confirmed by KRANTHI SU ([id number]) on   
2021 12:40:27 AM  
                                                            [employer]   
Phone:   
[phone]  
   
                                                            [place], [facility] Incoming E  
kg   
Results From Ge Page -   
2021 12:40 AM EDT   
Formatting of this note   
might be different from   
the original.  
Normal sinus rhythm with   
sinus arrhythmia  
Possible Anterior infarct   
, age undetermined  
Abnormal ECG  
No previous ECGs   
available  
Confirmed by KRANTHI SU ([id number]) on   
2021 12:40:27 AM                                         [employer]   
Phone:   
[phone]  
   
                                                                  [employer]   
Phone:   
[phone]  
   
                                                    Acetaminophenon 2021   
   
                                                    Acetaminophen   
[Mass/Vol]      ug/mL           Low             10-30           [facility]  
   
                                        Comment on above:   Performed By: #### A  
LCB, ACET ####  
[facility]  
[address] [place] [address] [phone]  
: Haresh Zimmer MD   
   
                                                    Acetaminophen LevelOrdered B  
y: Rafael Gallagher on 2021   
   
                          Acetaminophen Level <5           Low          10 - 30   
ug/mL                                   [employer]   
Phone:   
[phone]  
   
                                                    Interpretation and   
review of laboratory   
results         Abnormal                                        [employer]   
Phone:   
[phone]  
   
                                                                  [employer]   
Phone:   
[phone]  
   
                                                    CBC Auto DifferentialOrdered  
 By: Rafael Gallagher on 2021   
   
                      Absolute Eos # 0.07                             [employer]   
Phone:   
[phone]  
   
                                                    Absolute Immature   
Granulocyte     0.03                                            [employer]   
Phone:   
[phone]  
   
                      Absolute Lymph # 2.20                             [employer]   
Phone:   
[phone]  
   
                      Absolute Mono # 0.76                             [employer]   
Phone:   
[phone]  
   
                      Basophils (Bld) [#/Vol] 0.05 10*3/uL                        
 [employer]   
Phone:   
[phone]  
   
                      Basophils/100 WBC (Bld) 0 %                   0 - 2 %    M  
[facility]   
Phone:   
[phone]  
   
                      Differential Type NOT REPORTED                       [employer]   
Phone:   
[phone]  
   
                                                    Eosinophils/100 WBC   
(Bld)           1 %                             1 - 4 %         [employer]   
Phone:   
[phone]  
   
                                                    Hematocrit (Bld)   
[Volume fraction]   45.2 %                                  36.3 - 47.1   
%                                       [employer]   
Phone:   
[phone]  
   
                                                    Hemoglobin.gastrointest  
inal spec 1 Ql (Stl) 15.0 g/dL                               11.9 - 15.1   
g/dL                                    [employer]   
Phone:   
[phone]  
   
                                                    Immature   
granulocytes/100 WBC   
(Bld)           0 %                             0               [employer]   
Phone:   
[phone]  
   
                                                    Interpretation and   
review of laboratory   
results         Abnormal                                        [employer]   
Phone:   
[phone]  
   
                                                    Lymphocytes/100 WBC   
(Bld)           18 %            Low             24 - 43 %       [employer]   
Phone:   
[phone]  
   
                                                    MCH (RBC) [Entitic   
mass]               28.6 pg                                 25.2 - 33.5   
pg                                      [employer]   
Phone:   
[phone]  
   
                          MCHC (RBC) [Mass/Vol] 33.2 g/dL                 28.4 -  
 34.8   
g/dL                                    [employer]   
Phone:   
[phone]  
   
                          MCV (RBC) [Entitic vol] 86.3 fL                   82.6  
 -   
102.9 fL                                [employer]   
Phone:   
[phone]  
   
                      Monocytes/100 WBC (Bld) 6 %                   3 - 12 %   M  
[employer]   
Phone:   
[phone]  
   
                          NRBC Automated 0.0                       0.0 per 100   
WBC                                     [employer]   
Phone:   
[phone]  
   
                                                    Platelet distribution   
width (Bld) [Ratio] 13.0 %                                  11.8 - 14.4   
%                                       [employer]   
Phone:   
[phone]  
   
                      Platelet Estimate NOT REPORTED                       [employer]   
Phone:   
[phone]  
   
                                                    Platelet mean volume   
(Bld) [Entitic vol] 9.1 fL                                  8.1 - 13.5   
fL                                      [employer]   
Phone:   
[phone]  
   
                      Platelets (Bld) [#/Vol] 296 10*3/uL                         
[employer]   
Phone:   
[phone]  
   
                          RBC (Bld) [#/Vol] 5.24 10*6/uL High         3.95 - 5.1  
1   
m/uL                                    [employer]   
Phone:   
[phone]  
   
                      RBC (Bld) [#/Vol] NOT REPORTED                       [employer]   
Phone:   
[phone]  
   
                                                    Segmented   
neutrophils/100 WBC   
(Bld)           75 %            High            36 - 65 %       [employer]   
Phone:   
[phone]  
   
                      Segs Absolute 9.07       High                  [employer]   
Phone:   
[phone]  
   
                      WBC (Bld) [#/Vol] 12.2 10*3/uL High                  [employer]   
Phone:   
[phone]  
   
                      WBC (Bld) [#/Vol] NOT REPORTED                       [employer]   
Phone:   
[phone]  
   
                                                                  [employer]   
Phone:   
[phone]  
   
                                                    CBC with Diffon 2021   
   
                      Abs. Basophil 0.05 k/uL  Normal     0.00-0.20  [facility]  
   
                                        Comment on above:   Performed By: #### C  
OVRB ####  
[facility]  
[address] [place] [address] [phone]  
: Haresh Zimmer MD   
   
                      Abs.Imm.Granulocyte 0.03 k/uL  Normal     0.00-0.30  [facility]  
   
                                        Comment on above:   Performed By: #### C  
OVRB ####  
[facility]  
[address] [place], [place] [address]  
[phone]  
: Haresh Zimmer MD   
   
                      Abs.Neutrophil (Seg) 9.07 k/uL  High       1.50-8.10  [facility]  
   
                                        Comment on above:   Performed By: #### C  
OVRB ####  
[address] [place], [place] [address]  
[phone]  
: Haresh Zimmer MD   
   
                      Basophils/100 WBC (Bld) 0 %        Normal     0-2        M  
[facility]  
   
                                        Comment on above:   Performed By: #### C  
OVRB ####  
[address] [place] [address]  
[phone]  
: Haresh Zimmer MD   
   
                                                    Eosinophils (Bld)   
[#/Vol]         0.07 10*3/uL    Normal          0.00-0.44       [facility]  
   
                                        Comment on above:   Performed By: #### C  
OVRB ####  
[address] [place], [place] [address]  
[phone]  
: Haresh Zimmer MD   
   
                                                    Eosinophils/100 WBC   
(Bld)           1 %             Normal          1-4             [facility]  
   
                                        Comment on above:   Performed By: #### C  
OVRB ####  
[address] [place], [place] [address]  
[phone]  
: Haresh Zimmer MD   
   
                                                    Erythrocyte   
distribution width   
(RBC) [Ratio]   13.0 %          Normal          11.8-14.4       [facility]  
   
                                        Comment on above:   Performed By: #### C  
OVRB ####  
[address] [place] [address] [phone]  
: Haresh Zimmer MD   
   
                                                    Hematocrit (Bld)   
[Volume fraction] 45.2 %          Normal          36.3-47.1       [facility]  
   
                                        Comment on above:   Performed By: #### C  
OVRB ####  
[address] [place] [address] [phone]  
: Haresh Zimmer MD   
   
                                                    Hemoglobin (Bld)   
[Mass/Vol]      15.0 g/dL       Normal          11.9-15.1       [facility]  
   
                                        Comment on above:   Performed By: #### C  
OVRB ####  
[facility]  
[address] [place], [place] [address] [phone]  
: Haresh Zimmer MD   
   
                                                    Immature   
granulocytes/100 WBC   
(Bld)           0 %             Normal          0               [facility]  
   
                                        Comment on above:   Performed By: #### C  
OVRB ####  
[facility]  
[address] [place], [place] [address] [phone]  
: Haresh Zimmer MD   
   
                                                    Lymphocytes (Bld)   
[#/Vol]         2.20 10*3/uL    Normal          1.10-3.70       [facility]  
   
                                        Comment on above:   Performed By: #### C  
OVRB ####  
[address] [place], [facility] [phone]  
: Haresh Zimmer MD   
   
                                                    Lymphocytes/100 WBC   
(Bld)           18 %            Low             24-43           [facility]  
   
                                        Comment on above:   Performed By: #### C  
OVRB ####  
[address] [place], [place] [address] [phone]  
: Haresh Zimmer MD   
   
                                                    MCH (RBC) [Entitic   
mass]           28.6 pg         Normal          25.2-33.5       [facility]  
   
                                        Comment on above:   Performed By: #### C  
OVRB ####  
[facility]  
[address] [place] [address] [address] [place] [id number] [phone]  
: Haresh Zimmer MD   
   
                      MCHC (RBC) [Mass/Vol] 33.2 g/dL  Normal     28.4-34.8  [facility]  
   
                                        Comment on above:   Performed By: #### C  
OVRB ####  
[facility]  
[address] [place] [address] [phone]  
: Haresh Zimmer MD   
   
                      MCV (RBC) [Entitic vol] 86.3 fL    Normal     82.6-102.9 [facility]  
   
                                        Comment on above:   Performed By: #### C  
OVRB ####  
[facility]  
[address] [place] [address] [phone]  
: Haresh Zimmer MD   
   
                      Monocytes (Bld) [#/Vol] 0.76 10*3/uL Normal     0.10-1.20   
 [facility]  
   
                                        Comment on above:   Performed By: #### C  
OVRB ####  
[facility]  
[address] [place], [place] [address] [phone]  
: Haresh Zimmer MD   
   
                      Monocytes/100 WBC (Bld) 6 %        Normal     3-12       [facility]  
   
                                        Comment on above:   Performed By: #### C  
OVRB ####  
[address] [place], [place] [address] [phone]  
: Haresh Zimmer MD   
   
                      Neutrophil (Seg) 75 %       High       36-65      [facility]  
   
                                        Comment on above:   Performed By: #### C  
OVRB ####  
[address] [place], [place] [address] [phone]  
: Haresh Zimmer MD   
   
                      NRBC Automated 0.0 per 100 WBC Normal     0.0        [facility]  
   
                                        Comment on above:   Performed By: #### C  
OVRB ####  
[address] [place], [facility] [phone]  
: Haresh Zimmer MD   
   
                                                    Platelet mean volume   
(Bld) [Entitic vol] 9.1 fL          Normal          8.1-13.5        [facility]  
   
                                        Comment on above:   Performed By: #### C  
OVRB ####  
[address] [place] [address] [phone]  
: Haresh Zimmer MD   
   
                      Platelets (Bld) [#/Vol] 296 10*3/uL Normal     138-453      
[facility]  
   
                                        Comment on above:   Performed By: #### C  
OVRB ####  
[facility]  
[address] [place] [address] [address] [place], [place] [address] [phone]  
: Haresh Zimmer MD   
   
                      RBC (Bld) [#/Vol] 5.24 10*6/uL High       3.95-5.11  [facility]  
   
                                        Comment on above:   Performed By: #### C  
OVRB ####  
[address] [place], [place] [address] [phone]  
: Haresh Zimmer MD   
   
                      WBC (Bld) [#/Vol] 12.2 10*3/uL High       3.5-11.3   [facility]  
   
                                        Comment on above:   Performed By: #### C  
OVRB ####  
[address] [place], [place] [address] [phone]  
: Haresh Zimmer MD   
   
                      Auto Diff Performed NOT REPORTED Normal                [facility]  
   
                                        Comment on above:   Performed By: #### C  
OVRB ####  
[address] [place], [facility]83 [phone]  
: Haresh Zimmer MD   
   
                      Platelet Estimate NOT REPORTED Normal                [facility]  
   
                                        Comment on above:   Performed By: #### C  
OVRB ####  
[address] [place], [facility]83 [phone]  
: Haresh Zimmer MD   
   
                                                    RBC morphology finding   
Nom (d)       NOT REPORTED    Normal                          [facility]  
   
                                        Comment on above:   Performed By: #### C  
OVRB ####  
[address] [place], [facility]83 [phone]  
: Haresh Zimmer MD   
   
                      WBC Morphology NOT REPORTED Normal                [facility]  
   
                                        Comment on above:   Performed By: #### C  
OVRB ####  
[address] [place] [address] [phone]  
: Haresh Zimmer MD   
   
                                                    COVID-19, RapidOrdered By: PIPO Gallagher on 2021   
   
                                                    SARS-CoV-2 (COVID-19)   
RNA PAMELA+probe Ql (Unsp   
spec)               Not detected                            Not   
Detected                                [facility]   
Phone:   
[phone]  
   
                                        Comment on above:     
Rapid NAAT: The specimen is NEGATIVE for SARS-CoV-2, the novel   
coronavirus associated with  
COVID-19.  
  
The ID NOW COVID-19 assay is designed to detect the virus that   
causes COVID-19 in patients  
with signs and symptoms of infection who are suspected of   
COVID-19.  
An individual without symptoms of COVID-19 and who is not   
shedding SARS-CoV-2 virus would  
expect to have a negative (not detected) result in this assay.  
Negative results should be treated as presumptive and, if   
inconsistent with clinical signs  
and symptoms or necessary for patient management,  
should be tested with an alternative molecular assay. Negative   
results do not preclude  
SARS-CoV-2 infection and  
should not be used as the sole basis for patient management   
decisions.  
  
Fact sheet for Healthcare Providers:   
https://www.fda.gov/media/514894/download  
Fact sheet for Patients:   
https://www.fda.gov/media/366550/download  
  
Methodology: Isothermal Nucleic Acid Amplification  
  
   
   
                      Specimen Description .NASOPHARYNGEAL SWAB                   
      [facility]   
Phone:   
[phone]  
   
                                                                  [facility]   
Phone:   
[phone]  
   
                                                    Comp Metabolic Profon 2021   
   
                      (cont.)               Normal                [facility]  
   
                                        Comment on above:   Result Comment: Aver  
age GFR for 20-29 years old:  
116 mL/min/1.73sq m  
Chronic Kidney Disease:  
<60 mL/min/1.73sq m  
Kidney failure:  
<15 mL/min/1.73sq m  
eGFR calculated using average adult body mass. Additional eGFR   
calculator  
available at:  
http://www.[employer]/multiple_crcl_2012.htm   
   
                                                            Performed By: #### D  
AU ####  
[facility]  
[address] [place] [address] [phone]  
: Haresh Zimmer MD   
   
                      Albumin [Mass/Vol] 4.5 g/dL   Normal     3.5-5.2    [facility]  
   
                                        Comment on above:   Performed By: #### D  
AU ####  
[facility]  
[address] [place] [place] [address] [phone]  
: aHresh Zimmer MD   
   
                      Albumin/Glob Ratio 1.6        Normal     1.0-2.5    [facility]  
   
                                        Comment on above:   Performed By: #### D  
AU ####  
[facility]  
[address] [place], [place] [address] [phone]  
: Haresh Zimmer MD   
   
                      Alkaline Phos 72 U/L     Normal          [facility]  
   
                                        Comment on above:   Performed By: #### D  
AU ####  
[facility]  
[address] [place], [place] [address] [phone]  
: Haresh Zimmer MD   
   
                                                    ALT [Catalytic   
activity/Vol]   71 U/L          High            5-33            [facility]  
   
                                        Comment on above:   Performed By: #### D  
AU ####  
[facility]  
[address] [place], [place] [address] [phone]  
: Haresh Zimmer MD   
   
                      Anion gap [Moles/Vol] 10 mmol/L  Normal     9-17       [facility]  
   
                                        Comment on above:   Performed By: #### D  
AU ####  
[facility]  
[address] [place], [place] [address] [phone]  
: Haresh Zimmer MD   
   
                                                    AST [Catalytic   
activity/Vol]   35 U/L          High            <32             [facility]  
   
                                        Comment on above:   Performed By: #### D  
AU ####  
[facility]  
[address] [place], [place] [address] [phone]  
: Haresh Zimmer MD   
   
                      Bilirubin [Mass/Vol] 0.34 mg/dL Normal     0.3-1.2    [facility]  
   
                                        Comment on above:   Performed By: #### D  
AU ####  
[facility]  
[address] [place], [place] [address] [phone]  
: Haresh Zimmer MD   
   
                      BUN/CRE Ratio 14         Normal     9-20       [facility]  
   
                                        Comment on above:   Performed By: #### D  
AU ####  
[facility]  
[address] [place], [place] [address] [phone]  
: Haresh Zimmer MD   
   
                      Calcium [Mass/Vol] 10.3 mg/dL Normal     8.6-10.4   [facility]  
   
                                        Comment on above:   Performed By: #### D  
AU ####  
[facility]  
[address] [place], [place] [address] [phone]  
: Haresh Zimmer MD   
   
                      Chloride [Moles/Vol] 103 mmol/L Normal          [facility]  
   
                                        Comment on above:   Performed By: #### D  
AU ####  
[facility]  
[address] [place], [place] [address] [phone]  
: Haresh Zimmer MD   
   
                      CO2 [Moles/Vol] 23 mmol/L  Normal     20-31      [facility]  
   
                                        Comment on above:   Performed By: #### D  
AU ####  
[facility]  
[address] [place], [place] [address] [phone]  
: Haresh Zimmer MD   
   
                      Creatinine [Mass/Vol] 0.65 mg/dL Normal     0.50-0.90  [facility]  
   
                                        Comment on above:   Performed By: #### D  
AU ####  
[facility]  
[address] [place], [place] [address] [phone]  
: Haresh Zimmer MD   
   
                      GFR, Amer >60        Normal     >60        [facility]  
   
                                        Comment on above:   Performed By: #### D  
AU ####  
[facility]  
[address] [place], [place] [address] [phone]  
: Haresh Zimmer MD   
   
                      GFR,non  Amer >60        Normal     >60        [facility]  
   
                                        Comment on above:   Performed By: #### D  
AU ####  
[facility]  
[address] [place], [place] [address] [phone]  
: Haresh Zimmer MD   
   
                      Glucose [Mass/Vol] 84 mg/dL   Normal     70-99      [facility]  
   
                                        Comment on above:   Performed By: #### D  
AU ####  
[facility]  
[address] [place], [place] [address] [phone]  
: Haresh Zimmer MD   
   
                      Potassium [Moles/Vol] 4.2 mmol/L Normal     3.7-5.3    [facility]  
   
                                        Comment on above:   Performed By: #### D  
AU ####  
[facility]  
[address] [place] [address] [place] [place], [place] [address] [phone]  
: Haresh Zimmer MD   
   
                      Protein [Mass/Vol] 7.4 g/dL   Normal     6.4-8.3    [facility]  
   
                                        Comment on above:   Performed By: #### D  
AU ####  
[facility]  
[address] [place], [place] [address] [phone]  
: Haresh Zimmer MD   
   
                      Sodium [Moles/Vol] 136 mmol/L Normal     135-144    [facility]  
   
                                        Comment on above:   Performed By: #### D  
AU ####  
[facility]  
[address] [place] [address] [phone]  
: Haresh Zimmer MD   
   
                      Staging:              Normal                [facility]  
   
                                        Comment on above:   Result Comment: Stag  
e 1: Some kidney damage normal GFR  
Stage 2: Mild kidney damage GFR 60-89  
Stage 3: Moderate kidney damage GFR 30-59  
Stage 4: Severe kidney damage GFR 15-29  
Stage 5: Severe kidney damage GFR <15  
ESRD - chronic treatment by dialysis or transplant   
   
                                                            Performed By: #### D  
AU ####  
[facility]  
[address] [place] [address] [place] [place], [place] [address] [phone]  
: Haresh Zimmer MD   
   
                                                    Urea nitrogen   
[Mass/Vol]      9 mg/dL         Normal          6-20            [facility]  
   
                                        Comment on above:   Performed By: #### D  
AU ####  
[facility]  
[address] [place] [address] [phone]  
: Haresh Zimmer MD   
   
                                                    Comprehensive Metabolic Pane  
lOrdered By: Rafael Gallagher on 2021   
   
                          Albumin [Mass/Vol] 4.5 g/dL                  3.5 - 5.2  
   
g/dL                                    [facility]  
Work   
Phone:   
[phone]  
   
                                                    Albumin/Globulin [Mass   
ratio]          1.6 {ratio}                                     [facility]   
Phone:   
[phone]  
   
                                                    ALP (Bld) [Catalytic   
activity/Vol]       72 U/L                                  35 - 104   
U/L                                     [facility]   
Phone:   
[phone]  
   
                                                    ALT [Catalytic   
activity/Vol]   71 U/L          High            5 - 33 U/L      [facility]   
Phone:   
[phone]  
   
                          Anion gap [Moles/Vol] 10 mmol/L                 9 - 17  
   
mmol/L                                  [facility]   
Phone:   
[phone]  
   
                                                    AST [Catalytic   
activity/Vol]   35 U/L          High            <32             [facility]   
Phone:   
[phone]  
   
                          Bilirubin [Mass/Vol] 0.34 mg/dL                0.3 - 1  
.2   
mg/dL                                   [facility]   
Phone:   
[phone]  
   
                          Calcium [Mass/Vol] 10.3 mg/dL                8.6 - 10.  
4   
mg/dL                                   [facility]   
Phone:   
[phone]  
   
                          Chloride [Moles/Vol] 103 mmol/L                98 - 10  
7   
mmol/L                                  [facility]   
Phone:   
[phone]  
   
                          CO2 [Moles/Vol] 23 mmol/L                 20 - 31   
mmol/L                                  [facility]   
Phone:   
[phone]  
   
                          Creatinine [Mass/Vol] 0.65 mg/dL                0.50 -  
 0.90   
mg/dL                                   [facility]   
Phone:   
[phone]  
   
                                                    Free PSA/Total PSA   
[Mass fraction]     7.4 g/dL                                6.4 - 8.3   
g/dL                                    [facility]   
Phone:   
[phone]  
   
                      GFR  >60                   >60 mL/min [facility]  
y   
Health  
Work   
Phone:   
[phone]  
   
                                                    GFR Non-   
American        >60                             >60 mL/min      [facility]   
Phone:   
[phone]  
   
                          Glucose [Mass/Vol] 84 mg/dL                  70 - 99   
mg/dL                                   [facility]   
Phone:   
[phone]  
   
                          Potassium [Moles/Vol] 4.2 mmol/L                3.7 -   
5.3   
mmol/L                                  [facility]   
Phone:   
[phone]  
   
                          Sodium [Moles/Vol] 136 mmol/L                135 - 144  
   
mmol/L                                  [facility]  
[employer]   
Phone:   
[phone]  
   
                                                    Urea nitrogen (BldV)   
[Mass/Vol]          9 mg/dL                                 6 - 20   
mg/dL                                   [facility]  
[employer]   
Phone:   
[phone]  
   
                                                    Urea   
nitrogen/Creatinine   
(Bld) [Mass ratio] 14                                              [facility]  
[employer]   
Phone:   
[phone]  
   
                                                    Drug Scr, Abuse, Uron 2021   
   
                      Amphetamine(s),Ur Negative   Normal     NEG        [facility]  
   
                                        Comment on above:   Performed By: #### C  
OVRB ####  
[facility]  
[address] [place] [address] [phone]  
: Haresh Zimmer MD   
   
                      Barbiturate(s),Ur Negative   Normal     NEG        [facility]  
   
                                        Comment on above:   Performed By: #### C  
OVRB ####  
[facility]  
[address] [place] [address] [phone]  
: Haresh Zimmer MD   
   
                      Benzodiazepine(s) Negative   Normal     [facility]  
   
                                        Comment on above:   Performed By: #### C  
OVRB ####  
[facility]  
[address] [place] [address] [phone]  
: Haresh Zimmer MD   
   
                      Buprenorphrine, Ur Negative   Normal     [facility]  
   
                                        Comment on above:   Performed By: #### C  
OVRB ####  
[facility]  
[address] [place], [place] [address] [phone]  
: Haresh Zimmer MD   
   
                      Cannabinoid(s),Ur Positive   Abnormal   NEG        [facility]  
   
                                        Comment on above:   Performed By: #### C  
OVRB ####  
[facility]  
[address] [place] [address] [phone]  
: Haresh Zimmer MD   
   
                      Cocaine Metabolite Negative   Normal     [facility]  
   
                                        Comment on above:   Performed By: #### C  
OVRB ####  
[facility]  
[address] [place] [address] [phone]  
: Haresh Zimmer MD   
   
                      Methadone Ql (U) Negative   Normal     NEG        [facility]  
   
                                        Comment on above:   Performed By: #### C  
OVRB ####  
[facility]  
[address] [place] [address] [phone]  
: Haresh Zimmer MD   
   
                      Methamphetamine, Ur Negative   Normal     NEG        [facility]  
   
                                        Comment on above:   Performed By: #### C  
OVRB ####  
[facility]  
[address] [place] [address] [phone]  
: Haresh Zimmer MD   
   
                      Opiate(s), Ur Negative   Normal     NEG        [facility]  
   
                                        Comment on above:   Performed By: #### C  
OVRB ####  
[facility]  
[address] [place] [address] [phone]  
: Haresh Zimmer MD   
   
                      Oxycodone, Urine Negative   Normal     [facility]  
   
                                        Comment on above:   Performed By: #### C  
OVRB ####  
[facility]  
[address] [place], [place] [address] [phone]  
: Haresh Zimmer MD   
   
                      Phencyclidine, Ur Negative   Normal     [facility]  
   
                                        Comment on above:   Performed By: #### C  
OVRB ####  
[facility]  
[address] [place] [address] [place] [place], [place] [address] [phone]  
: Haresh Zimmer MD   
   
                      Propoxyphene,Urine Negative   Normal     [facility]  
   
                                        Comment on above:   Performed By: #### C  
OVRB ####  
[facility]  
[address] [place], [place] [address] [phone]  
: Haresh Zimmer MD   
   
                                                    Tricyclic   
antidepressants Screen   
Ql (U)          Negative        Normal          [facility]  
   
                                        Comment on above:   Result Comment: Drug  
 screen results are to be used for medical  
  
purposes only. All positive  
results are unconfirmed. Testing for employment or legal uses   
should be sent  
to a reference laboratory for confirmation.   
   
                                                            Performed By: #### C  
OVRB ####  
[facility]  
[address] [place] [address] [phone]  
: Haresh Zimmer MD   
   
                      Interpretive Info NOT REPORTED Normal                [facility]  
   
                                        Comment on above:   Performed By: #### C  
OVRB ####  
[facility]  
[address] [place] [address] [place] [address] [phone]  
: Haresh Zimmer MD   
   
                                                    Drug Scr, Abuse, UrOrdered B  
y: Rafael Gallagher on 2021   
   
                      MDMA, Urine NOT REPORTED Normal     NEG        [facility]   
Phone:   
[phone]  
   
                                        Comment on above:   Performed By: #### C  
OVRB ####  
[address] [place] [address] [phone]  
: Haresh Zimmer MD   
   
                                                    EthanolOrdered By: Rafael valdivia on 2021   
   
                      Ethanol [Mass/Vol] mg/dL                 <10 mg/dL  [facility]   
Phone:   
[phone]  
   
                      Ethanol percent <0.010                <0.010 %   [facility]   
[employer]   
Phone:   
[phone]  
   
                                                                  [facility]   
Phone:   
[phone]  
   
                                                    Ethanol Alcoholon 2021  
   
   
                      Ethanol [Mass/Vol] mg/dL      Normal     <10        [facility]  
   
                                        Comment on above:   Performed By: #### A  
LCB, ACET ####  
[address] [place] [address] [phone]  
: Haresh Zimmer MD   
   
                      Ethanol percent <0.010     Normal     <0.010     [facility]  
   
                                        Comment on above:   Performed By: #### A  
[facility], ACET ####  
[address] [place] [address] [phone]  
: Haresh Zimmer MD   
   
                                                    HCG Qualitative, SerumOrdere  
d By: Rafael Gallagher on 2021   
   
                      hCG Qual   Negative              NEGATIVE   [facility]   
Phone:   
[phone]  
   
                                        Comment on above:   Specimens with hCG l  
evels near the threshold of the test (25   
mIU/mL) may give a negative or  
indeterminate result. In such cases, another test should be   
performed with a new specimen  
in 48-72 hours. If early pregnancy is suspected clinically in   
this setting, correlation  
with quantitative serum b-hCG level is suggested.  
  
[employer] has confirmed the use of plasma for this   
test. This has not been cleared  
or approved by the U.S. Food and Drug Administration. The FDA   
has determined that such  
clearance is not necessary.  
  
   
   
                                                                  [facility]   
Phone:   
[phone]  
   
                                                    HCG Screen, Bloodon 20  
21   
   
                      HCG Screen, Blood Negative   Normal     NEG        [facility]  
   
                                        Comment on above:   Result Comment: Spec  
imens with hCG levels near the threshold   
of the test (25 mIU/mL) may give a  
negative or indeterminate result. In such cases, another test   
should be  
performed with a new specimen in 48-72 hours. If early   
pregnancy is suspected  
clinically in this setting, correlation with quantitative   
serum b-hCG level is  
suggested.  
[employer] has confirmed the use of plasma for this   
test. This has not  
been cleared or approved by the U.S. Food and Drug   
Administration. The FDA has  
determined that such clearance is not necessary.   
   
                                                            Performed By: #### D  
AU ####  
[facility]  
[address] [place] [address] [place] [address]  
[phone]  
: Haresh Zimmer MD   
   
                                                    Laboratory - Chemistry and C  
hemistry - challengeOrdered By: Rafael Gallagher on   
2021   
   
                                                    GFR/1.73 sq M.predicted   
MDRD (S/P/Bld) [Vol   
rate/Area]                                                      [facility]   
Phone:   
[phone]  
   
                                        Comment on above:   Average GFR for 20-2  
9 years old:  
116 mL/min/1.73sq m  
Chronic Kidney Disease:  
<60 mL/min/1.73sq m  
Kidney failure:  
<15 mL/min/1.73sq m  
  
  
eGFR calculated using average adult body mass. Additional eGFR   
calculator available at:  
  
http://www.[employer].com/multiple_crcl_2012.htm  
  
  
   
   
                                                            Stage 1: Some kidney  
 damage normal GFR  
Stage 2: Mild kidney damage GFR 60-89  
Stage 3: Moderate kidney damage GFR 30-59  
Stage 4: Severe kidney damage GFR 15-29  
Stage 5: Severe kidney damage GFR <15  
ESRD - chronic treatment by dialysis or transplant  
  
  
   
   
                                                    No Panel InformationOrdered   
By: Rafael Gallagher on 2021   
   
                                                    Interpretation and   
review of laboratory   
results         Abnormal                                        [facility]   
Phone:   
[phone]  
   
                                                                  [facility]   
[employer]   
Phone:   
[phone] [id number]  
   
                                                    SARS-CoV-2on 2021   
   
                                                    SARS-CoV-2 (COVID-19)   
RNA PAMELA+probe Ql (Unsp   
spec)           Not detected    Normal          NOTDET          [facility]  
   
                                        Comment on above:   Result Comment:  
Rapid NAAT: The specimen is NEGATIVE for SARS-CoV-2, the novel   
coronavirus  
associated with COVID-19.  
The ID NOW COVID-19 assay is designed to detect the virus that   
causes COVID-19  
in patients with signs and symptoms of infection who are   
suspected of COVID-19.  
An individual without symptoms of COVID-19 and who is not   
shedding SARS-CoV-2  
virus would expect to have a negative (not detected) result in   
this assay.  
Negative results should be treated as presumptive and, if   
inconsistent with  
clinical signs and symptoms or necessary for patient   
management,  
should be tested with an alternative molecular assay. Negative   
results do not  
preclude SARS-CoV-2 infection and  
should not be used as the sole basis for patient management   
decisions.  
Fact sheet for Healthcare Providers:   
https://www.fda.gov/media/690542/download  
Fact sheet for Patients:   
https://www.fda.gov/media/005987/download  
Methodology: Isothermal Nucleic Acid Amplification   
   
                                                            Performed By: #### C  
OVRB ####  
[facility]  
[address] [place] [address]  
[phone]  
: Haresh Zimmer MD   
   
                                                    SPECIMEN REJECTIONOrdered By  
: Rafael Gallagher on 2021   
   
                      -          NOT REPORTED                       [facility]   
[employer]   
Phone:   
[phone]  
   
                      Ordered Test CDP                              [facility]  
[employer]   
Phone:   
[phone]  
   
                                        Reason for Rejection Unable to perform   
testing: No specimen   
received.                                                   [facility]  
[employer]   
Phone:   
[phone]  
   
                                                    Specimen source Nom   
(Unsp spec)     .BLOOD                                          [facility]  
[employer]   
Phone:   
[phone]  
   
                                                                  [facility]  
[employer]   
Phone:   
[phone]  
   
                                                    Salicylateon 2021   
   
                      Salicylate <1         Low        3-10       [facility]  
   
                                        Comment on above:   Performed By: #### D  
AU ####  
[facility]  
[address] [place], [place] [address] [phone]  
: Haresh Zimmer MD   
   
                                                    SalicylateOrdered By: Rafael Gallagher on 2021   
   
                          Salicylate Lvl <1           Low          3 - 10   
mg/dL                                   [facility]  
Work   
Phone:   
[phone]  
   
                                                    Specimen Rejectionon   
021   
   
                                        Reason for rejection Unable to perform   
testing: No specimen   
received.           Normal                                  [facility]  
   
                                        Comment on above:   Performed By: #### C  
OVRB ####  
[facility]  
[address] [place], [place] [address] [phone]  
: Haresh Zimmer MD   
   
                      Source of sample .BLOOD     Normal                [facility]  
   
                                        Comment on above:   Performed By: #### C  
OVRB ####  
[facility]  
[address] [place], [place] [address] [phone]  
: Haresh Zimmer MD   
   
                      Test ordered CDP        Normal                [facility]  
   
                                        Comment on above:   Performed By: #### C  
OVRB ####  
[facility]  
[address] [place], [place] [address] [phone]  
: Haresh Zimmer MD   
   
                      -----      NOT REPORTED [facility]  
   
                                        Comment on above:   Performed By: #### C  
OVRB ####  
[facility]  
[address] [place] [address]  
[phone]  
: Haresh Zimmer MD   
   
                                                    Urinalysis w/ Microon 2021   
   
                      -----                 Normal                [facility]  
   
                                        Comment on above:   Performed By: #### C  
OVRB ####  
[facility]  
[address] [place], [place] [address] [phone]  
: Haresh Zimmer MD   
   
                      Bacteria   1+         Abnormal   NONE       [facility]  
   
                                        Comment on above:   Performed By: #### C  
OVRB ####  
[facility]  
[address] [place] [address] [phone]  
: Haresh Zimmer MD   
   
                      Bilirubin, SemiQt,Ur Negative   Normal     NEG        [facility]  
   
                                        Comment on above:   Performed By: #### C  
OVRB ####  
[facility]  
[address] [place] [address] [phone]  
: Haresh Zimmer MD   
   
                      Blood, Urine Negative   Normal     NEG        [facility]  
   
                                        Comment on above:   Performed By: #### C  
OVRB ####  
[facility]  
[address] [place] [address] [phone]  
: Haresh Zimmer MD   
   
                      Clarity (U) CLEAR      Normal     CLEAR      [facility]  
   
                                        Comment on above:   Performed By: #### C  
OVRB ####  
[facility]  
[address] [place] [address] [phone]  
: Haresh Zimmer MD   
   
                      Color (U)  YELLOW     Normal     YEL        [facility]  
   
                                        Comment on above:   Performed By: #### C  
OVRB ####  
[facility]  
[address] [place], [place] [address] [phone]  
: Haresh Zimmer MD   
   
                                                    Epithelial cells LM Ql   
(Urine sed)     5 TO 10         Normal          0-25            [facility]  
   
                                        Comment on above:   Performed By: #### C  
OVRB ####  
[facility]  
[address] [place], [place] [address] [phone]  
: Haresh Zimmer MD   
   
                      Glucose Ql (U) Negative   Normal     NEG        [facility]  
   
                                        Comment on above:   Performed By: #### C  
OVRB ####  
[facility]  
[address] [place], [place] [address] [phone]  
: Haresh Zimmer MD   
   
                      Ketones Ql (U) Negative   Normal     NEG        [facility]  
   
                                        Comment on above:   Performed By: #### C  
OVRB ####  
[facility]  
[address] [place] [address] [phone]  
: Haresh Zimmer MD   
   
                                                    Leukocyte esterase Test   
strip Ql (U)    Negative        Normal          NEG             [facility]  
   
                                        Comment on above:   Performed By: #### C  
OVRB ####  
[facility]  
[address] [place], [place] [address] [phone]  
: Haresh Zimmer MD   
   
                      Nitrite,Ur Negative   Normal     NEG        [facility]  
   
                                        Comment on above:   Performed By: #### C  
OVRB ####  
[facility]  
[address] [place], [place] [address] [phone]  
: Haresh Zimmer MD   
   
                      PH,Ur      5.5        Normal     5.0-9.0    [facility]  
   
                                        Comment on above:   Performed By: #### C  
OVRB ####  
[facility]  
[address] [place], [place] [address] [phone]  
: Haresh Zimmer MD   
   
                      Protein Ql (U) Negative   Normal     NEG        [facility]  
   
                                        Comment on above:   Performed By: #### C  
OVRB ####  
[facility]  
[address] [place], [place] [address] [phone]  
: Haresh Zimmer MD   
   
                      Spec. Gravity,Ur 1.025      High       1.010-1.020 [facility]  
   
                                        Comment on above:   Performed By: #### C  
OVRB ####  
[facility]  
[address] [place], [place] [address] [phone]  
: Haresh Zimmer MD   
   
                      Urine RBC's None       Normal     0-2        [facility]  
   
                                        Comment on above:   Performed By: #### C  
OVRB ####  
[facility]  
[address] [place], [place] [address] [phone]  
: Haresh Zimmer MD   
   
                      Urine WBC's 0 TO 2     Normal     0-5        [facility]  
   
                                        Comment on above:   Performed By: #### C  
OVRB ####  
[facility]  
[address] [place], [place] [address] [phone]  
: Haresh Zimmer MD   
   
                      Urobilinogen,Ur Normal     Normal     NORM       [facility]  
   
                                        Comment on above:   Performed By: #### C  
OVRB ####  
[facility]  
[address] [place], [place] [address] [phone]  
: Haresh Zimmer MD   
   
                                                    Amorphous sediment LM   
Ql (Urine sed)  NOT REPORTED    Normal          NONE            [facility]  
   
                                        Comment on above:   Performed By: #### C  
OVRB ####  
[facility]  
[address] [place], [place] [address] [phone]  
: Haresh Zimmer MD   
   
                      Casts      NOT REPORTED Normal                [facility]  
   
                                        Comment on above:   Performed By: #### C  
OVRB ####  
[facility]  
[address] [place] [address] [phone]  
: Haresh Zimmer MD   
   
                      Comment    NOT REPORTED Normal                [facility]  
   
                                        Comment on above:   Performed By: #### C  
OVRB ####  
[facility]  
[address] [place] [address] [phone]  
: Haresh Zimmer MD   
   
                                                    Crystals LM Nom (Urine   
sed)            NOT REPORTED    Normal          [facility]  
   
                                        Comment on above:   Performed By: #### C  
OVRB ####  
[facility]  
[address] [place] [address] [phone]  
: Haresh Zimmer MD   
   
                      Epithelial, Renal NOT REPORTED Normal     0          [facility]  
   
                                        Comment on above:   Performed By: #### C  
OVRB ####  
[facility]  
[address] [place], [place] [address] [phone]  
: Haresh Zimmer MD   
   
                      Mucus Strands NOT REPORTED Normal     [facility]  
   
                                        Comment on above:   Performed By: #### C  
OVRB ####  
[facility]  
[address] [place] [address] [phone]  
: Haresh Zimmer MD   
   
                      Other Observations NOT REPORTED Normal     NREQ       [facility]  
   
                                        Comment on above:   Performed By: #### C  
OVRB ####  
[facility]  
[address] [place] [address] [phone]  
: Haresh Zimmer MD   
   
                      Trichomonas NOT REPORTED Normal     [facility]  
   
                                        Comment on above:   Performed By: #### C  
OVRB ####  
[facility]  
[address] [place] [address] [phone]  
: Haresh Zimmer MD   
   
                      Yeast      NOT REPORTED Normal     NONE       [facility]  
   
                                        Comment on above:   Performed By: #### C  
OVRB ####  
[facility]  
[address] [place] [address] [phone]  
: Haresh Zimmer MD   
   
                                                    Urinalysis with microscopicO  
rdered By: Seth Rahman on 2021   
   
                      -                                           [employer]  
Work   
Phone:   
[phone]  
   
                      Amorphous, UA NOT REPORTED            None       [employer]   
Phone:   
[phone]  
   
                      Bacteria, UA 1+         Abnormal   None       [employer]   
Phone:   
[phone]  
   
                      Bilirubin Urine Negative              NEGATIVE   [employer]   
Phone:   
[phone]  
   
                      Casts UA   NOT REPORTED            /LPF       [employer]   
Phone:   
[phone]  
   
                      Color, UA  YELLOW                YELLOW     [employer]  
Work   
Phone:   
[phone]  
   
                      Crystals, UA NOT REPORTED            None /HPF  [employer]  
Work   
Phone:   
[phone]  
   
                      Epithelial Cells UA 5 TO 10                          [employer]   
Phone:   
[phone]  
   
                      Glucose, Ur Negative              NEGATIVE   [employer]   
Phone:   
[phone]  
   
                                                    Interpretation and   
review of laboratory   
results         Abnormal                                        [employer]   
Phone:   
[phone]  
   
                      Ketones Ql (U) Negative              NEGATIVE   [employer]   
Phone:   
[phone]  
   
                                                    Leukocyte esterase Test   
strip Ql (U)    Negative                        NEGATIVE        [employer]   
Phone:   
[phone]  
   
                      Mucus, UA  NOT REPORTED            None       [employer]   
Phone:   
[phone]  
   
                      Nitrite, Urine Negative              NEGATIVE   [employer]   
Phone:   
[phone]  
   
                      Other Observations UA NOT REPORTED            NOT REQ.   [facility]  
[facility]  
Work   
Phone:   
[phone]  
   
                      pH, UA     5.5                              [employer]  
Work   
Phone:   
[phone]  
   
                      Protein, UA Negative              NEGATIVE   [employer]   
Phone:   
[phone]  
   
                      RBC, UA    None                             [facility]   
Health  
Work   
Phone:   
[phone]  
   
                      Renal Epithelial, UA NOT REPORTED            0 /HPF     [employer]  
y   
Health  
Work   
Phone:   
[phone]  
   
                      Specific [place], UA 1.025      High                  [facility]  
y   
Health  
Work   
Phone:   
[phone]  
   
                      Trichomonas, UA NOT REPORTED            None       [facility]   
Health  
Work   
Phone:   
[phone]  
   
                      Turbidity UA CLEAR                 CLEAR      [facility]   
Health  
Work   
Phone:   
[phone]  
   
                      Urinalysis Comments NOT REPORTED                       [facility]   
Health  
Work   
Phone:   
[phone]  
   
                      Urine Hgb  Negative              NEGATIVE   [facility]   
Health  
Work   
Phone:   
[phone]  
   
                      Urobilinogen, Urine Normal                Normal     [facility]  
   
Health  
Work   
Phone:   
[phone]  
   
                      WBC, UA    0 TO 2                           [facility]   
Health  
Work   
Phone:   
[phone]  
   
                      Yeast, UA  NOT REPORTED            None       [facility]   
Health  
Work   
Phone:   
[phone]  
   
                                                                  [facility]   
Health  
Work   
Phone:   
[phone]  
   
                                                    Urine Drug ScreenOrdered By:  
 Rafael Gallagher on 2021   
   
                      Amphetamine Screen, Ur Negative              NEGATIVE   [facility]   
Health  
Work   
Phone:   
[phone]  
   
                      Barbiturate Screen, Ur Negative              NEGATIVE   [facility]   
Health  
Work   
Phone:   
[phone]  
   
                                                    Benzodiazepine Screen,   
Urine           Negative                        NEGATIVE        [facility]   
Health  
Work   
Phone:   
[phone]  
   
                      Buprenorphine Urine Negative              NEGATIVE   [facility]  
   
Health  
Work   
Phone:   
[phone]  
   
                      Cannabinoid Scrn, Ur Positive   Abnormal   NEGATIVE   [facility]  
y   
Health  
Work   
Phone:   
[phone]  
   
                                                    Cocaine Metabolite,   
Urine           Negative                        NEGATIVE        [facility]   
Health  
Work   
Phone:   
[phone]  
   
                                                    Interpretation and   
review of laboratory   
results         Abnormal                                        [facility]   
Health  
Work   
Phone:   
[phone]  
   
                      Methadone Screen, Urine Negative              NEGATIVE   [facility]  
[facility]   
Health  
Work   
Phone:   
[phone]  
   
                      Methamphetamine, Urine Negative              NEGATIVE   [facility]  
y   
Health  
Work   
Phone:   
[phone]  
   
                      Opiates, Urine Negative              NEGATIVE   [facility]   
Health  
Work   
Phone:   
[phone]  
   
                      Oxycodone Screen, Ur Negative              NEGATIVE   [facility]  
y   
Health  
Work   
Phone:   
[phone]  
   
                      Phencyclidine, Urine Negative              NEGATIVE   [facility]  
y   
Health  
Work   
Phone:   
[phone]  
   
                      Propoxyphene, Urine Negative              NEGATIVE   [employer]   
Phone:   
[phone]  
   
                      Test Information NOT REPORTED                       [employer]   
Phone:   
[phone]  
   
                                                    Tricyclic   
Antidepressants, Urine Negative                        NEGATIVE        [employer]   
Phone:   
[phone]  
   
                                        Comment on above:   Drug screen results   
are to be used for medical purposes only.   
All positive results are  
unconfirmed. Testing for employment or legal uses should be   
sent to a reference laboratory  
for confirmation.  
  
   
   
                                                                  [employer]   
Phone:   
[phone]  
   
                                                    COVID-19 PCRon 2020   
   
                          SARS-CoV-2, PAMELA Not Detected Normal       Not   
Detected                                The   
[facility]  
   
                                        Comment on above:   Result Comment: This  
 test was developed and its performance   
characteristics determined  
by [employer]. This test has not been FDA cleared or  
approved. This test has been authorized by FDA under an   
Emergency Use  
Authorization (EUA). This test is only authorized for the   
duration of  
time the declaration that circumstances exist justifying the  
authorization of the emergency use of in vitro diagnostic   
tests for  
detection of SARS-CoV-2 virus and/or diagnosis of COVID-19   
infection  
under section 564(b)(1) of the Act, 21 U.S.C. 360bbb-3(b)(1),   
unless  
the authorization is terminated or revoked sooner.  
When diagnostic testing is negative, the possibility of a   
false  
negative result should be considered in the context of a   
patient's  
recent exposures and the presence of clinical signs and   
symptoms  
consistent with COVID-19. An individual without symptoms of   
COVID-19  
and who is not shedding SARS-CoV-2 virus would expect to have   
a  
negative (not detected) result in this assay.   
   
                                                            Performed By: #### C  
VDPCR ####  
[facility]  
[address] [place] [address]  
Sosa Multani   
   
                                                    CBC/PLATELET & AUTO DIFFEREN  
[place] 2017   
   
                                                    Basophils Auto #/vol   
(Bld)           0.0 10*3/uL     Normal          0.0-0.1         [facility]  
   
                                                    Basophils/100 WBC Auto   
(Bld)           0 %             Normal          0-1             [facility]  
   
                                                    CBC/PLATELET & AUTO   
DIFFERENTIAL    0.0 10*3/uL     Normal          -               [facility]  
   
                                        Comment on above:   Result Comment: er 7  
   
   
                      Eosinophils 0.0 10*3/uL Normal     0.0-0.4    [facility]  
   
                                                    Eosinophils/100   
leukocytes      0 %             Normal          0-5             [facility]  
   
                                                    Erythrocyte   
distribution width Auto   
Ratio (RBC)     12.9 %          Normal          11.7-14.4       [facility]  
   
                      Erythrocytes (RBC) 4.79 10*6/uL Normal     4.20-5.40  [facility]  
   
                      Hematocrit (HCT) 40.0 %     Normal     37.0-47.0  [facility]  
   
                                                    Hemoglobin mass conc   
(Bld)           14.0 g/dL       Normal          11.5-16.0       [facility]  
   
                      Lymphocytes 1.8 10*3/uL Normal     0.9-2.5    [facility]  
   
                                                    Lymphocytes/100   
leukocytes      10 %            Low             13-44           [facility]  
   
                      MCH        29.2 pg    Normal     27.0-31.0  [facility]  
   
                      MCHC mass conc (RBC) 35.0 g/dL  Normal     31.5-36.0  [facility]  
   
                      MCV        83.5 fL    Normal     82.0-101.0 [facility]  
   
                      Monocytes  1.0 10*3/uL Normal     0.1-1.0    [facility]  
   
                                                    Monocytes/100   
leukocytes      6 %             Normal          5-9             [facility]  
   
                      Neutrophils 14.5 10*3/uL High       2.1-6.5    [facility]  
   
                                                    Neutrophils/100   
leukocytes      83 %            High            39-75           [facility]  
   
                      Nucleated erythrocytes 0 %        Normal     -          [facility]  
   
                                                    Platelet mean volume   
(PMV)           8.5 fL          Normal          8.2-11.4        [facility]  
   
                      Platelets  239.0 10*3/uL Normal     130.0-400.0 [facility]  
   
                      WBC (Leukocytes) 17.6 10*3/uL High       4.4-10.2   [facility]  
   
                                                    CHEST PORTABLEon 2017   
   
                                        CHEST PORTABLE      EXAM: Portable   
chest.CLINICAL STATEMENT:   
Syncopal episode   
today.COMPARISON:   
2017.TECHNIQUE:   
Single mobile AP upright   
view chest at 3:21   
PM.FINDINGS: Heart size   
within normal limits.   
There is no mediastinal   
widening.The lung fields   
and pleural spaces are   
clear.IMPRESSION:No acute   
change.Dictated   
Physician: Je BellDictated On: 2017   
15:30Interpreted By:   
Je BellTranscribed By: Gunnar Belligned By: Je Bell.Signed On:   
2017 15:30    Normal                                  [facility]  
   
                                                    HCG URINEon 2017   
   
                                                    HCG.beta subunit   
(pregnancy test) Ql (U) Negative        Normal          Negative        [facility]  
   
                                                    HCG.beta subunit   
(pregnancy test) Ql (U) SEE NOTE        Normal          -               [facility]  
   
                                        Comment on above:   Result Comment: er 7  
   
   
                                                    M I PANELon 2017   
   
                      Anion gap  12.8 mmol/L Normal     -          [facility]  
   
                      BUN/Creatinine Ratio 10.2 mg/mg Normal     [facility]  
   
                      Calcium    8.8 mg/dL  Normal     8.5-10.1   [facility]  
   
                      Chloride   107 mmol/L Normal          [facility]  
   
                      CO2        27.1 mmol/L Normal     21.0-32.0  [facility]  
   
                      Creatinine 0.98 mg/dL Normal     0.55-1.02  [facility]  
   
                      eGFR (non-black) mL/min/{1.73_m2} Normal     >60        [facility]  
   
                      Glucose mass conc 87 mg/dL   Normal          [facility]  
   
                      M I PANEL  SEE NOTE   Normal     -          [facility]  
   
                                        Comment on above:   Result Comment: er 7  
   
   
                      Magnesium  1.9 mg/dL  Normal     1.8-2.4    [facility]  
   
                      Osmolality 283 mosm/kg Normal     -          [facility]  
   
                      Potassium molar conc 3.9 mmol/L Normal     3.5-5.1    [facility]  
   
                      Sodium     143 mmol/L Normal     136-145    [facility]  
   
                                                    Troponin I.cardiac mass   
conc            ng/mL           Normal          0.000-0.056     [facility]  
   
                      Urea nitrogen 10 mg/dL   Normal     7-18       [facility]  
   
                                                    PT PROTIMEon 2017   
   
                      INR Coag RelTime (PPP) 1.0 {INR}  Normal     -          [facility]  
   
                                                    Prothrombin time (PT)   
Coag time (PPP) 10.5 s          Normal          9.3-11.7        [facility]  
   
                      PT PROTIME SEE NOTE   Normal     [facility]  
   
                                        Comment on above:   Result Comment: er 7  
   
   
                                                    PTTon 2017   
   
                      aPTT       24.8 s     Normal     24.5-32.7  [facility]  
   
                                        Comment on above:   Result Comment: er 7  
   
   
                                                    URINALYSIS W/ MICROSCOPIC if  
 indicatedon 2017   
   
                      Bilirubin Ql (U) Negative   Normal     Negative   [facility]  
   
                      Blood      Moderate   Abnormal   Negative   [facility]  
   
                      Blood product type Clean catch Normal     -          [facility]  
   
                      Glucose mass conc Normal     Normal     Normal     [facility]  
   
                      Protein    Moderate   Abnormal   Negative   [facility]  
   
                      Sq. Epithelial Cells 3 /[HPF]   Abnormal   None seen  [facility]  
   
                                                    URINALYSIS W/   
MICROSCOPIC if   
indicated       0 /[HPF]        Normal          0-2             [facility]  
   
                                        Comment on above:   Result Comment: er 7  
   
   
                      Urine, appearance CLEAR      Normal     Clear      [facility]  
   
                                                    Urine, bacteria in   
sediment        1 /[HPF]        Abnormal        None seen       [facility]  
   
                      Urine, color YELLOW     Normal     -          [facility]  
   
                      Urine, ketones presence Negative   Normal     Negative   H  
[facility]  
   
                      Urine, nitrite presence Positive   Abnormal   Negative   H  
[facility]  
   
                      Urine, pH  7.0 [pH]   Normal     5.0 - 9.0  [facility]  
   
                          Urine, specific gravity 1.010        Normal       1.01  
0 -   
1.030                                   [facility]  
   
                      Urine, urobilinogen Normal     Normal     Normal     [facility]  
   
                      WBC (Leukocytes) 6 /[HPF]   Abnormal   0-5        [facility]  
   
                      WBC (Leukocytes) Moderate   Abnormal   Negative   [facility]  
  
  
  
Vital Signs  
  
  
                          Date Time    Vital Sign   Value        Performing   
Clinician                               Facility  
   
                                                    2024   
16:          Body height         162.56 cm           Doreen Cabrera CNP  
Work Phone:   
[phone]                          [facility]  
Work Phone:   
[phone]  
   
                                                    2024   
16:                              Body mass index   
(BMI) [Ratio]             57.2 kg/m2                Doreen Cabrera [facility]  
Work Phone:   
[phone]                          [facility]  
Work Phone:   
[phone]  
   
                                                    2024   
16:                              Body surface area   
Derived from formula      2.4 m2                    Doreen Cabrera [facility]  
Work Phone:   
[phone]                          [facility]  
Work Phone:   
[phone]  
   
                                                    2024   
16:          Body temperature    97.6 [degF]         Doreen Cabrera CNP  
Work Phone:   
[phone]                          [facility]  
Work Phone:   
[phone]  
   
                                                    2024   
16:          Body weight         151.05 kg           Doreen Cabrera CNP  
Work Phone:   
[phone]                          [facility]  
Work Phone:   
[phone]  
   
                                                    2024   
16:                              Diastolic blood   
pressure                  86 mm[Hg]                 Doreen Cabrera CNP  
Work Phone:   
[phone]                          [facility]  
Work Phone:   
[phone]  
   
                                                    2024   
16:          Heart rate          103 /min            Doreen Cabrera CNP  
Work Phone:   
[phone]                          [facility]  
Work Phone:   
[phone]  
   
                                                    2024   
16:          Respiratory rate    18 /min             Doreen Cabrera CNP  
Work Phone:   
[phone]                          [facility]  
Work Phone:   
[phone]  
   
                                                    2024   
16:                              SaO2% (BldA) [Mass   
fraction]                 95 %                      Doreen Cabrera CNP  
Work Phone:   
[phone]                          [facility]  
Work Phone:   
[phone]  
   
                                                    2024   
16:                              Systolic blood   
pressure                  135 mm[Hg]                Doreen Cabrera CNP  
Work Phone:   
[phone]                          [facility]  
Work Phone:   
[phone]  
   
                                                    2024   
17:                              Diastolic blood   
pressure                  84 mm[Hg]                 Doreen Cabrera CNP  
Work Phone:   
[phone]                          [facility]  
   
                                        Comment on above:   manual large   
   
                                                    2024   
17:                              Systolic blood   
pressure                  144 mm[Hg]                Doreen Cabrera CNP  
Work Phone:   
[phone]                          [facility]  
   
                                        Comment on above:   manual large   
   
                                                    2024   
17:                              Diastolic blood   
pressure                  86 mm[Hg]                 Doreen Cabrera CNP  
Work Phone:   
[phone]                          [facility]  
Work Phone:   
[phone]  
   
                                                    2024   
17:                              Systolic blood   
pressure                  154 mm[Hg]                Doreen Cabrera CNP  
Work Phone:   
[phone]                          [facility]  
Work Phone:   
[phone]  
   
                                                    2024   
16:          Body height         162.56 cm           Doreen Cabrera CNP  
Work Phone:   
[phone]                          [facility]  
Work Phone:   
[phone]  
   
                                                    2024   
16:                              Body mass index   
(BMI) [Ratio]             57.9 kg/m2                Doreen Cabrera CNP  
Work Phone:   
[phone]                          [facility]  
Work Phone:   
[phone]  
   
                                                    2024   
16:                              Body surface area   
Derived from formula      2.4 m2                    Doreen Cabrera CNP  
Work Phone:   
[phone]                          [facility]  
Work Phone:   
[phone]  
   
                                                    2024   
16:          Body weight         153.14 kg           Doreen Cabrera CNP  
Work Phone:   
[phone]                          [facility]  
Work Phone:   
[phone]  
   
                                                    2024   
16:                              Diastolic blood   
pressure                  98 mm[Hg]                 Doreen Cabrera CNP  
Work Phone:   
[phone]                          [facility]  
Work Phone:   
[phone]  
   
                                                    2024   
16:          Heart rate          103 /min            Doreen Cabrera CNP  
Work Phone:   
[phone]                          [facility]  
Work Phone:   
[phone]  
   
                                                    2024   
16:                              SaO2% (BldA) [Mass   
fraction]                 96 %                      Doreen Cabrera CNP  
Work Phone:   
[phone]                          [facility]  
Work Phone:   
[phone]  
   
                                                    2024   
16:                              Systolic blood   
pressure                  151 mm[Hg]                Doreen Cabrera CNP  
Work Phone:   
[phone]                          [facility]  
Work Phone:   
[phone]  
   
                                                    2024   
16:                              Diastolic blood   
pressure                  89 mm[Hg]                 Doreen Cabrera CNP  
Work Phone:   
[phone]                          [facility]  
   
                                                    2024   
16:          Heart rate          75 /min             Doreen Cabrera CNP  
Work Phone:   
[phone]                          [facility]  
   
                                                    2024   
16:                              Systolic blood   
pressure                  137 mm[Hg]                Doreen Cabrera CNP  
Work Phone:   
[phone]                          [facility]  
   
                                                    2024   
16:          Body height         162.56 cm           Doreen Cabrera CNP  
Work Phone:   
[phone]                          [facility]  
   
                                                    2024   
16:                              Body mass index   
(BMI) [Ratio]             54.6 kg/m2                Doreen Cabrera CNP  
Work Phone:   
[phone]                          [facility]  
   
                                                    2024   
16:                              Body surface area   
Derived from formula      2.4 m2                    Doreen Cabrera CNP  
Work Phone:   
[phone]                          [facility]  
   
                                                    2024   
16:          Body weight         144.24 kg           Doreen Cabrera CNP  
Work Phone:   
[phone]                          [facility]  
   
                                                    2024   
16:                              Diastolic blood   
pressure                  94 mm[Hg]                 Doreen Cabrera CNP  
Work Phone:   
[phone]                          [facility]  
   
                                                    2024   
16:          Heart rate          75 /min             Doreen Cabrera CNP  
Work Phone:   
[phone]                          [facility]  
   
                                                    2024   
16:                              SaO2% (BldA) [Mass   
fraction]                 98 %                      Doreen Cabrera CNP  
Work Phone:   
[phone]                          [facility]  
   
                                                    2024   
16:                              Systolic blood   
pressure                  161 mm[Hg]                Doreen Cabrera CNP  
Work Phone:   
[phone]                          [facility]  
   
                                                    2024   
19:          Body height         162.56 cm           Doreen Cabrera CNP  
Work Phone:   
[phone]                          [facility]  
   
                                                    2024   
19:                              Body mass index   
(BMI) [Ratio]             52.7 kg/m2                Doreen Cabrera CNP  
Work Phone:   
[phone]                          [facility]  
   
                                                    2024   
19:                              Body surface area   
Derived from formula      2.3 m2                    Doreen Cabrera CNP  
Work Phone:   
[phone]                          [facility]  
   
                                                    2024   
19:          Body weight         139.26 kg           Doreen Cabrera CNP  
Work Phone:   
[phone]                          [facility]  
   
                                                    2024   
19:                              Diastolic blood   
pressure                  88 mm[Hg]                 Doreen Deborah CNP  
Work Phone:   
[phone]                          [facility]  
   
                                                    2024   
19:          Heart rate          106 /min            Doreen Cabrera CNP  
Work Phone:   
[phone]                          [facility]  
   
                                                    2024   
19:                              SaO2% (BldA) [Mass   
fraction]                 97 %                      Doreen Cabrera CNP  
Work Phone:   
[phone]                          [facility]  
   
                                                    2024   
19:                              Systolic blood   
pressure                  134 mm[Hg]                Doreen Mccormacken CNP  
Work Phone:   
[phone]                          [facility]  
   
                                                    2023   
15:                              Diastolic blood   
pressure                  69 mm[Hg]                 Doreen Mccormacken CNP  
Work Phone:   
[phone]                          [facility]  
   
                                                    2023   
15:                              Systolic blood   
pressure                  116 mm[Hg]                Doreen Deborah CNP  
Work Phone:   
[phone]                          [facility]  
   
                                                    2023   
15:                              Diastolic blood   
pressure                  78 mm[Hg]                 Doreen Deborah CNP  
Work Phone:   
[phone]                          [facility]  
Work Phone:   
[phone]  
   
                                                    2023   
15:                              Systolic blood   
pressure                  137 mm[Hg]                Doreen Deborah CNP  
Work Phone:   
[phone]                          [facility]  
Work Phone:   
[phone]  
   
                                                    2023   
17:          Body height         162.56 cm           Doreen Deborah CNP  
Work Phone:   
[phone]                          [facility]  
Work Phone:   
[phone]  
   
                                                    2023   
17:                              Body mass index   
(BMI) [Ratio]             32.8 kg/m2                Doreen Cabrera CNP  
Work Phone:   
[phone]                          [facility]  
Work Phone:   
[phone]  
   
                                                    2023   
17:                              Body surface area   
Derived from formula      1.9 m2                    Doreen Cabrera CNP  
Work Phone:   
[phone]                          [facility]  
Work Phone:   
[phone]  
   
                                                    2023   
17:          Body temperature    98.2 [degF]         Doreen Cabrera CNP  
Work Phone:   
[phone]                          [facility]  
Work Phone:   
[phone]  
   
                                                    2023   
17:          Body weight         86.64 kg            Doreen Cabrera CNP  
Work Phone:   
[phone]                          [facility]  
Work Phone:   
[phone]  
   
                                                    2023   
17:                              Diastolic blood   
pressure                  76 mm[Hg]                 Doreen Cabrera CNP  
Work Phone:   
[phone]                          [facility]  
Work Phone:   
[phone]  
   
                                                    2023   
17:          Heart rate          97 /min             Doreen Cabrera CNP  
Work Phone:   
[phone]                          [facility]  
Work Phone:   
[phone]  
   
                                                    2023   
17:                              SaO2% (BldA) [Mass   
fraction]                 97 %                      Doreen Cabrera CNP  
Work Phone:   
[phone]                          [facility]  
Work Phone:   
[phone]  
   
                                                    2023   
17:                              Systolic blood   
pressure                  111 mm[Hg]                Doreen Cabrera CNP  
Work Phone:   
[phone]                          [facility]  
Work Phone:   
[phone]  
   
                                                    07-   
14:                              Diastolic blood   
pressure                  76 mm[Hg]                 Doreen Cabrera CNP  
Work Phone:   
[phone]                          [facility]  
   
                                                    07-   
14:          Heart rate          94 /min             Doreen Cabrera CNP  
Work Phone:   
[phone]                          [facility]  
   
                                                    07-   
14:                              Systolic blood   
pressure                  124 mm[Hg]                Doreen Cabrera CNP  
Work Phone:   
[phone]                          [facility]  
   
                                                    10-   
13:          Body height         162.56 cm           Doreen Cabrera CNP  
Work Phone:   
[phone]                          [facility]  
Work Phone:   
[phone]  
   
                                                    10-   
13:                              Body mass index   
(BMI) [Ratio]             52.5 kg/m2                Doreen Cabrera CNP  
Work Phone:   
[phone]                          [facility]  
Work Phone:   
[phone]  
   
                                                    10-   
13:                              Body surface area   
Derived from formula      2.3 m2                    Doreen Cabrrea CNP  
Work Phone:   
[phone]                          [facility]  
Work Phone:   
[phone]  
   
                                                    10-   
13:          Body temperature    99.3 [degF]         Doreen Cabrera CNP  
Work Phone:   
[phone]                          [facility]  
Work Phone:   
[phone]  
   
                                                    10-   
13:          Body weight         138.8 kg            Doreen Cabrera CNP  
Work Phone:   
[phone]                          [facility]  
Work Phone:   
[phone]  
   
                                                    10-   
13:                              Diastolic blood   
pressure                  86 mm[Hg]                 Doreen Cabrera CNP  
Work Phone:   
[phone]                          [facility]  
Work Phone:   
[phone]  
   
                                                    10-   
13:          Heart rate          96 /min             Doreen Cabrera CNP  
Work Phone:   
[phone]                          [facility]  
Work Phone:   
[phone]  
   
                                                    10-   
13:          Heart Rate Rhythm   1 1                 Doreen Cabrera CNP  
Work Phone:   
[phone]                          [facility]  
Work Phone:   
[phone]  
   
                                                    10-   
13:                              SaO2% (BldA) [Mass   
fraction]                 97 %                      Doreen Cabrera CNP  
Work Phone:   
[phone]                          [facility]  
Work Phone:   
[phone]  
   
                                                    10-   
13:                              Systolic blood   
pressure                  124 mm[Hg]                Doreen Cabrera CNP  
Work Phone:   
[phone]                          [facility]  
Work Phone:   
[phone]  
   
                                                    2022   
15:          Body height         162.56 cm           Doreen Cabrera CNP  
Work Phone:   
[phone]                          [facility]  
Work Phone:   
[phone]  
   
                                                    2022   
15:                              Body mass index   
(BMI) [Ratio]             53.6 kg/m2                Doreen Cabrera CNP  
Work Phone:   
[phone]                          [facility]  
Work Phone:   
[phone]  
   
                                                    2022   
15:                              Body surface area   
Derived from formula      2.36 m2                   Doreen Cabrera CNP  
Work Phone:   
[phone]                          [facility]  
Work Phone:   
[phone]  
   
                                                    2022   
15:                              Body surface area   
Derived from formula      2.4 m2                    Doreen Cabrera CNP  
Work Phone:   
[phone]                          [facility]  
Work Phone:   
[phone]  
   
                                                    2022   
15:          Body temperature    97.4 [degF]         Doreen Cabrera CNP  
Work Phone:   
[phone]                          [facility]  
Work Phone:   
[phone]  
   
                                                    2022   
15:          Body weight         141.52 kg           Doreen Cabrera CNP  
Work Phone:   
[phone]                          [facility]  
Work Phone:   
[phone]  
   
                                                    2022   
15:                              Diastolic blood   
pressure                  82 mm[Hg]                 Doreen Cabrera CNP  
Work Phone:   
[phone]                          [facility]  
Work Phone:   
[phone]  
   
                                                    2022   
15:          Heart rate          86 /min             Doreen Cabrera CNP  
Work Phone:   
[phone]                          [facility]  
Work Phone:   
[phone]  
   
                                                    2022   
15:                              SaO2% (BldA) [Mass   
fraction]                 98 %                      Doreen Cabrera CNP  
Work Phone:   
[phone]                          [facility]  
Work Phone:   
[phone]  
   
                                                    2022   
15:                              Systolic blood   
pressure                  124 mm[Hg]                Doreen Cabrera CNP  
Work Phone:   
[phone]                          [facility]  
Work Phone:   
[phone]  
   
                                                    2022   
15:          Body height         162.56 cm           Doreen Cabrear CNP  
Work Phone:   
[phone]                          [facility]  
Work Phone:   
[phone]  
   
                                                    2022   
15:                              Body mass index   
(BMI) [Ratio]             52.2 kg/m2                Doreen Cabrera CNP  
Work Phone:   
[phone]                          [facility]  
Work Phone:   
[phone]  
   
                                                    2022   
15:                              Body surface area   
Derived from formula      2.34 m2                   Doreen Cabrera CNP  
Work Phone:   
[phone]                          [facility]  
Work Phone:   
[phone]  
   
                                                    2022   
15:                              Body surface area   
Derived from formula      2.3 m2                    Doreen Cabrera CNP  
Work Phone:   
[phone]                          [facility]  
Work Phone:   
[phone]  
   
                                                    2022   
15:          Body temperature    99.3 [degF]         Doreen Cabrera CNP  
Work Phone:   
[phone]                          [facility]  
Work Phone:   
[phone]  
   
                                                    2022   
15:          Body weight         137.89 kg           Doreen Cabrera CNP  
Work Phone:   
[phone]                          [facility]  
Work Phone:   
[phone]  
   
                                                    2022   
15:                              Diastolic blood   
pressure                  94 mm[Hg]                 Doreen Cabrera CNP  
Work Phone:   
[phone]                          [facility]  
Work Phone:   
[phone]  
   
                                                    2022   
15:          Heart rate          105 /min            Doreen Cabrera CNP  
Work Phone:   
[phone]                          [facility]  
Work Phone:   
[phone]  
   
                                                    2022   
15:          Heart Rate Rhythm   1 1                 Doreen Cabrera CNP  
Work Phone:   
[phone]                          [facility]  
Work Phone:   
[phone]  
   
                                                    2022   
15:                              SaO2% (BldA) [Mass   
fraction]                 98 %                      Doreen Cabrera CNP  
Work Phone:   
[phone]                          [facility]  
Work Phone:   
[phone]  
   
                                                    2022   
15:                              Systolic blood   
pressure                  126 mm[Hg]                Doreen Cabrera CNP  
Work Phone:   
[phone]                          [facility]  
Work Phone:   
[phone]  
   
                                                    2022   
16:                              Diastolic blood   
pressure                  102 mm[Hg]                Doreen Cabrera CNP  
Work Phone:   
[phone]                          [facility]  
Work Phone:   
[phone]  
   
                                                    2022   
16:                              Systolic blood   
pressure                  150 mm[Hg]                Doreen Deborah CNP  
Work Phone:   
[phone]                          [facility]  
Work Phone:   
[phone]  
   
                                                    2022   
15:          Body height         162.56 cm           Doreen Cabrera CNP  
Work Phone:   
[phone]                          [facility]  
Work Phone:   
[phone]  
   
                                                    2022   
15:                              Body mass index   
(BMI) [Ratio]             52.6 kg/m2                Doreen Cabrera CNP  
Work Phone:   
[phone]                          [facility]  
Work Phone:   
[phone]  
   
                                                    2022   
15:                              Body surface area   
Derived from formula      2.34 m2                   Doreen Cabrera CNP  
Work Phone:   
[phone]                          [facility]  
Work Phone:   
[phone]  
   
                                                    2022   
15:                              Body surface area   
Derived from formula      2.3 m2                    Doreen Cabrera CNP  
Work Phone:   
[phone]                          [facility]  
Work Phone:   
[phone]  
   
                                                    2022   
15:          Body temperature    98.5 [degF]         Doreen Cabrera CNP  
Work Phone:   
[phone]                          [facility]  
Work Phone:   
[phone]  
   
                                                    2022   
15:          Body weight         138.98 kg           Doreen Cabrera CNP  
Work Phone:   
[phone]                          [facility]  
Work Phone:   
[phone]  
   
                                                    2022   
15:                              Diastolic blood   
pressure                  108 mm[Hg]                Doreen Cabrera CNP  
Work Phone:   
[phone]                          [facility]  
Work Phone:   
[phone]  
   
                                                    2022   
15:          Heart rate          89 /min             Doreen Cabrera CNP  
Work Phone:   
[phone]                          [facility]  
Work Phone:   
[phone]  
   
                                                    2022   
15:                              SaO2% (BldA) [Mass   
fraction]                 99 %                      Doreen Cabrera CNP  
Work Phone:   
[phone]                          [facility]  
Work Phone:   
[phone]  
   
                                                    2022   
15:                              Systolic blood   
pressure                  152 mm[Hg]                Doreen Cabrera CNP  
Work Phone:   
[phone]                          [facility]  
Work Phone:   
[phone]  
   
                                                    2021   
13:          Body height         162.56 cm           Doreen Cabrera CNP  
Work Phone:   
[phone]                          [facility]  
Work Phone:   
[phone]  
   
                                                    2021   
13:                              Body mass index   
(BMI) [Ratio]             50.1 kg/m2                Doreen Cabrera CNP  
Work Phone:   
[phone]                          [facility]  
Work Phone:   
[phone]  
   
                                                    2021   
13:                              Body surface area   
Derived from formula      2.3 m2                    Doreen Cabrera CNP  
Work Phone:   
[phone]                          [facility]  
Work Phone:   
[phone]  
   
                                                    2021   
13:          Body temperature    96.8 [degF]         Doreen Cabrera CNP  
Work Phone:   
[phone]                          [facility]  
Work Phone:   
[phone]  
   
                                                    2021   
13:          Body weight         132.45 kg           Doreen Carbera CNP  
Work Phone:   
[phone]                          [facility]  
Work Phone:   
[phone]  
   
                                                    2021   
13:                              Diastolic blood   
pressure                  86 mm[Hg]                 Doreen Cabrera CNP  
Work Phone:   
[phone]                          [facility]  
Work Phone:   
[phone]  
   
                                                    2021   
13:          Heart rate          86 /min             Doreen Cabrera CNP  
Work Phone:   
[phone]                          [facility]  
Work Phone:   
[phone]  
   
                                                    2021   
13:          Respiratory rate    18 /min             Doreen Cabrera CNP  
Work Phone:   
[phone]                          [facility]  
Work Phone:   
[phone]  
   
                                                    2021   
13:                              SaO2% (BldA) [Mass   
fraction]                 96 %                      Doreen Cabrera CNP  
Work Phone:   
[phone]                          [facility]  
Work Phone:   
[phone]  
   
                                                    2021   
13:                              Systolic blood   
pressure                  132 mm[Hg]                Doreen Cabrera CNP  
Work Phone:   
[phone]                          [facility]  
Work Phone:   
[phone]  
   
                                                    2021   
23:          Heart rate          100 /min            Seth Rahman MD  
Work Phone:   
[phone]                          [facility]  
Work Phone:   
[phone]  
   
                                                    2021   
23:                              Diastolic blood   
pressure                  94 mm[Hg]                 Seth Rahman MD  
Work Phone:   
[phone]                          [facility]  
Work Phone:   
[phone]  
   
                                                    2021   
23:                              Systolic blood   
pressure                  146 mm[Hg]                Seth Rahman MD  
Work Phone:   
[phone]                          [facility]  
Work Phone:   
[phone]  
   
                                                    2021   
22:                              SaO2% (BldA) [Mass   
fraction]                 97 %                      Seth Rahman MD  
Work Phone:   
[phone]                          [facility]  
Work Phone:   
[phone]  
   
                                                    2021   
14:                              Body mass index   
(BMI) [Ratio]             47.72 kg/m2               Seth Rahman MD  
Work Phone:   
[phone]                          [facility]  
Work Phone:   
[phone]  
   
                                                    2021   
14:          Body temperature    98.2 [degF]         Seth Rahman MD  
Work Phone:   
[phone]                          [facility]  
Work Phone:   
[phone]  
   
                                                    2021   
14:          Body weight         126.1 kg            Seth Rahman MD  
Work Phone:   
[phone]                          [facility]  
Work Phone:   
[phone]  
   
                                                    2021   
14:          Respiratory rate    16 /min             Seth Rahman MD  
Work Phone:   
[phone]                          [facility]  
Work Phone:   
[phone]  
   
                                                    2021   
16:          Body height         162.56 cm           Doreen Cabrera CNP  
Work Phone:   
[phone]                          [facility]  
Work Phone:   
[phone]  
   
                                                    2021   
16:                              Body mass index   
(BMI) [Ratio]             49.3 kg/m2                Doreen Cabrera CNP  
Work Phone:   
[phone]                          [facility]  
Work Phone:   
[phone]  
   
                                                    2021   
16:                              Body surface area   
Derived from formula      2.28 m2                   Doreen Cabrera CNP  
Work Phone:   
[phone]                          [facility]  
Work Phone:   
[phone]  
   
                                                    2021   
16:          Body temperature    97.4 [degF]         Doreen Cabrera CNP  
Work Phone:   
[phone]                          [facility]  
Work Phone:   
[phone]  
   
                                                    2021   
16:          Body weight         130.18 kg           Doreen Cabrera CNP  
Work Phone:   
[phone]                          [facility]  
Work Phone:   
[phone]  
   
                                                    2021   
16:                              Diastolic blood   
pressure                  98 mm[Hg]                 Doreen Cabrera CNP  
Work Phone:   
[phone]                          [facility]  
Work Phone:   
[phone]  
   
                                                    2021   
16:          Heart rate          85 /min             Doreen Cabrera CNP  
Work Phone:   
[phone]                          [facility]  
Work Phone:   
[phone]  
   
                                                    2021   
16:          Respiratory rate    18 /min             Doreen Cabrera CNP  
Work Phone:   
[phone]                          [facility]  
Work Phone:   
[phone]  
   
                                                    2021   
16:                              SaO2% (BldA) [Mass   
fraction]                 97 %                      Doreen Deborah CNP  
Work Phone:   
[phone]                          [facility]  
Work Phone:   
[phone]  
   
                                                    2021   
16:                              Systolic blood   
pressure                  144 mm[Hg]                Doreen Deborah CNP  
Work Phone:   
[phone]                          [facility]  
Work Phone:   
[phone]  
   
                                                    2021   
02:                              Diastolic blood   
pressure                  93 mm[Hg]                 Malika Shepherd MD  
Work Phone:   
[phone]                          [facility]  
Work Phone:   
[phone]  
   
                                                    2021   
02:          Heart rate          75 /min             Malika Shepherd MD  
Work Phone:   
[phone]                          [facility]  
Work Phone:   
[phone]  
   
                                                    2021   
02:          Respiratory rate    20 /min             Malika Shepherd MD  
Work Phone:   
[phone]                          [facility]  
Work Phone:   
[phone]  
   
                                                    2021   
02:                              SaO2% (BldA) [Mass   
fraction]                 100 %                     Malika Shepherd MD  
Work Phone:   
[phone]                          [facility]  
Work Phone:   
[phone]  
   
                                                    2021   
02:                              Systolic blood   
pressure                  150 mm[Hg]                Malika Shepherd MD  
Work Phone:   
[phone]                          [facility]  
Work Phone:   
[phone]  
   
                                                    2021   
20:          Body temperature    99.1 [degF]         Malika Shepherd MD  
Work Phone:   
[phone]                          [facility]  
Work Phone:   
[phone]  
   
                                                    2021   
11:          Body height         162.56 cm           Doreenpaola Cabrera CNP  
Work Phone:   
[phone]                          [facility]  
Work Phone:   
[phone]  
   
                                                    2021   
11:                              Body mass index   
(BMI) [Ratio]             48.9 kg/m2                Doreen Cabrera CNP  
Work Phone:   
[phone]                          [facility]  
Work Phone:   
[phone]  
   
                                                    2021   
11:                              Body surface area   
Derived from formula      2.27 m2                   Doreen Cabrera CNP  
Work Phone:   
[phone]                          [facility]  
Work Phone:   
[phone]  
   
                                                    2021   
11:          Body temperature    98.2 [degF]         Doreen Cabrera CNP  
Work Phone:   
[phone]                          [facility]  
Work Phone:   
[phone]  
   
                                                    2021   
11:          Body weight         129.28 kg           Doreen Cabrera CNP  
Work Phone:   
[phone]                          [facility]  
Work Phone:   
[phone]  
   
                                                    2021   
11:                              Diastolic blood   
pressure                  86 mm[Hg]                 Doreen Cabrera CNP  
Work Phone:   
[phone]                          [facility]  
Work Phone:   
[phone]  
   
                                                    2021   
11:          Heart rate          101 /min            Doreen Cabrera CNP  
Work Phone:   
[phone]                          [facility]  
Work Phone:   
[phone]  
   
                                                    2021   
11:                              SaO2% (BldA) [Mass   
fraction]                 98 %                      Doreen Cabrera CNP  
Work Phone:   
[phone]                          [facility]  
Work Phone:   
[phone]  
   
                                                    2021   
11:                              Systolic blood   
pressure                  124 mm[Hg]                Doreen Cabrera CNP  
Work Phone:   
[phone]                          [facility]  
Work Phone:   
[phone]  
   
                                                    2021   
16:          Body height         162.6 cm            Rafael SotoTrabajoPanel  
Work Phone:   
[phone]                          [facility]  
Work Phone:   
[phone]  
   
                                                    2021   
16:                              Body mass index   
(BMI) [Ratio]             47.2 kg/m2                Rafael Andes DO  
Work Phone:   
[phone]                          [facility]  
Work Phone:   
[phone]  
   
                                                    2021   
16:          Body temperature    98.8 [degF]         Rafael Andes DO  
Work Phone:   
[phone]                          [employer]  
Work Phone:   
[phone]  
   
                                                    2021   
16:          Body weight         124.74 kg           Rafael Andes DO  
Work Phone:   
[phone]                          [employer]  
Work Phone:   
[phone]  
   
                                                    2021   
16:                              Diastolic blood   
pressure                  88 mm[Hg]                 Rafael Andes DO  
Work Phone:   
[phone]                          [employer]  
Work Phone:   
[phone]  
   
                                                    2021   
16:          Heart rate          78 /min             Rafael Andes DO  
Work Phone:   
[phone]                          [employer]  
Work Phone:   
[phone]  
   
                                                    2021   
16:          Respiratory rate    18 /min             Rafael Andes DO  
Work Phone:   
[phone]                          [employer]  
Work Phone:   
[phone]  
   
                                                    2021   
16:                              SaO2% (BldA) [Mass   
fraction]                 96 %                      Rafael Andes DO  
Work Phone:   
[phone]                          [employer]  
Work Phone:   
[phone]  
   
                                                    2021   
16:                              Systolic blood   
pressure                  138 mm[Hg]                Rafael Andes DO  
Work Phone:   
[phone]                          [employer]  
Work Phone:   
[phone]  
   
                                                    06-   
15:          Body height         162.56 cm           Doreen Cabrera CNP  
Work Phone:   
[phone]                          [facility]  
Work Phone:   
[phone]  
   
                                                    06-   
15:                              Body mass index   
(BMI) [Ratio]             49.5 kg/m2                Doreen Cabrera CNP  
Work Phone:   
[phone]                          [facility]  
Work Phone:   
[phone]  
   
                                                    06-   
15:                              Body surface area   
Derived from formula      2.29 m2                   Doreen Cabrera CNP  
Work Phone:   
[phone]                          [facility]  
Work Phone:   
[phone]  
   
                                                    06-   
15:          Body temperature    97.6 [degF]         Doreen Cabrera CNP  
Work Phone:   
[phone]                          [facility]  
Work Phone:   
[phone]  
   
                                                    06-   
15:          Body weight         130.82 kg           Doreen Cabrera CNP  
Work Phone:   
[phone]                          [facility]  
Work Phone:   
[phone]  
   
                                                    06-   
15:                              Diastolic blood   
pressure                  88 mm[Hg]                 Doreen Cabrera CNP  
Work Phone:   
[phone]                          [facility]  
Work Phone:   
[phone]  
   
                                                    06-   
15:          Heart rate          83 /min             Doreen Cabrera CNP  
Work Phone:   
[phone]                          [facility]  
Work Phone:   
[phone]  
   
                                                    06-   
15:          Respiratory rate    20 /min             Doreen Cabrera CNP  
Work Phone:   
[phone]                          [facility]  
Work Phone:   
[phone]  
   
                                                    06-   
15:                              SaO2% (BldA) [Mass   
fraction]                 98 %                      Doreen Cabrera CNP  
Work Phone:   
[phone]                          [facility]  
Work Phone:   
[phone]  
   
                                                    06-   
15:                              Systolic blood   
pressure                  130 mm[Hg]                Doreen Cabrera CNP  
Work Phone:   
[phone]                          [facility]  
Work Phone:   
[phone]  
   
                                                    2021   
15:                              Diastolic blood   
pressure                  108 mm[Hg]                Doreen Cabrera CNP  
Work Phone:   
[phone]                          [facility]  
Work Phone:   
[phone]  
   
                                                    2021   
15:                              Systolic blood   
pressure                  134 mm[Hg]                Doreen Cabrera CNP  
Work Phone:   
[phone]                          [facility]  
Work Phone:   
[phone]  
   
                                                    2021   
14:          Body height         162.56 cm           Doreen Cabrera CNP  
Work Phone:   
[phone]                          [facility]  
Work Phone:   
[phone]  
   
                                                    2021   
14:                              Body mass index   
(BMI) [Ratio]             49.8 kg/m2                Doreen Cabrera CNP  
Work Phone:   
[phone]                          [facility]  
Work Phone:   
[phone]  
   
                                                    2021   
14:                              Body surface area   
Derived from formula      2.29 m2                   Doreen Cabrera CNP  
Work Phone:   
[phone]                          [facility]  
Work Phone:   
[phone]  
   
                                                    2021   
14:          Body temperature    96.4 [degF]         Doreen Cabrera CNP  
Work Phone:   
[phone]                          [facility]  
Work Phone:   
[phone]  
   
                                                    2021   
14:          Body weight         131.54 kg           Doreen Cabrera CNP  
Work Phone:   
[phone]                          [facility]  
Work Phone:   
[phone]  
   
                                                    2021   
14:                              Diastolic blood   
pressure                  108 mm[Hg]                Doreen Cabrera CNP  
Work Phone:   
[phone]                          [facility]  
Work Phone:   
[phone]  
   
                                                    2021   
14:          Heart rate          97 /min             Doreen Cabrera CNP  
Work Phone:   
[phone]                          [facility]  
Work Phone:   
[phone]  
   
                                                    2021   
14:          Respiratory rate    18 /min             Doreen Deborah CNP  
Work Phone:   
[phone]                          [facility]  
Work Phone:   
[phone]  
   
                                                    2021   
14:                              SaO2% (BldA) [Mass   
fraction]                 98 %                      Doreen Cabrera CNP  
Work Phone:   
[phone]                          [facility]  
Work Phone:   
[phone]  
   
                                                    2021   
14:                              Systolic blood   
pressure                  150 mm[Hg]                Doreen Cabrera CNP  
Work Phone:   
[phone]                          [facility]  
Work Phone:   
[phone]  
  
  
  
Encounters  
  
  
                          Encounter Date Encounter Type Care Provider Facility  
   
                                                    Start: 10-  
End: 10-     ambulatory          SYEDA PATTERSON         Not Available  
   
                                                    Start: 2024  
End: 2024           FQHC visit, estab pt      Radha Young LSW  
Work Phone:   
[phone]                          [facility]  
Work Phone:   
[phone]  
   
                                                    Start: 2024  
End: 2024           FQHC visit, estab pt      Leonie Short LISWS  
Work Phone:   
[phone]                          [facility]  
Work Phone:   
[phone]  
   
                                                    Start: 2024  
End: 2024                         Encounter for   
preprocedural laboratory   
examination                             Doreen Cabrera CNP  
Work Phone:   
[phone]                          [facility]  
Work Phone:   
[phone]  
   
                                                    Start: 2024  
End: 2024           FQHC visit, estab pt      Doreen Cabrera CNP  
Work Phone:   
[phone]                          [facility]  
Work Phone:   
[phone]  
   
                                                    Start: 2024  
End: 2024           FQHC visit, estab pt      Leonie Short LISWS  
Work Phone:   
[phone]                          [facility]  
Work Phone:   
[phone]  
   
                                                    Start: 2024  
End: 2024           FQHC visit, estab pt      Leonie Short LISWS  
Work Phone:   
[phone]                          [facility]  
Work Phone:   
[phone]  
   
                                                    Start: 2024  
End: 2024                         Emergency department   
patient visit             MIKKISJ CARNES CYNTHIA          [facility]  
   
                                                    Start: 2024  
End: 2024           FQHC visit, estab pt      Leonienani HUTCHINSON  
Work Phone:   
[phone]                          [facility]  
Work Phone:   
[phone]  
   
                                                    Start: 2024  
End: 2024           General                   Doreen Cabrera CNP  
Work Phone:   
[phone]                          [facility]  
Work Phone:   
[phone]  
   
                                                    Start: 2023  
End: 2023           General                   Brandy Beaulieu DDS  
Work Phone:   
[phone]                          [facility]  
Work Phone:   
[phone]  
   
                                                    Start: 2023  
End: 2023           General                   Brandy Beaulieu DDS  
Work Phone:   
[phone]                          [facility]  
Work Phone:   
[phone]  
   
                                                    Start: 2023  
End: 2023           FQHC visit, estab pt      Doreen Cabrera CNP  
Work Phone:   
[phone]                          [facility]  
Work Phone:   
[phone]  
   
                                        Start: 07-   comprehensive oral   
evaluation - new or   
established patient                     Brandy Beaulieu DDS  
Work Phone:   
[phone]                          [facility]  
   
                                                    Start: 07-  
End: 07-           General                   Yue Ronquillo RDH  
Work Phone:   
[phone]                          [facility]  
Work Phone:   
[phone]  
   
                                                    Start: 10-  
End: 10-     ambulatory          DOREEN CABRERA      [facility]  
   
                                                    Start: 10-  
End: 10-                         Subsequent hospital visit   
by physician                            Doreen Cabrera APRN -   
CNP  
Work Phone:   
[phone]                          [facility]   
CTR  
   
                                                    Start: 10-  
End: 10-           FQHC visit, estab pt      Doreen Cabrera CNP  
Work Phone:   
[phone]                          [facility]  
Work Phone:   
[phone]  
   
                                                    Start: 2022  
End: 2022           General                   Haylie Aguilar   
[facility]  
Work Phone:   
[phone]                          [facility]  
Work Phone:   
[phone]  
   
                                                    Start: 2022  
End: 2022           ambulatory                Julieth Aliya CNP  
Work Phone:   
[phone]                          [facility]  
Work Phone:   
[phone]  
   
                                                    Start: 2022  
End: 2022           General                   Julieth Aliya CNP  
Work Phone:   
[phone]                          [facility]  
Work Phone:   
[phone]  
   
                                                    Start: 2022  
End: 2022           FQHC visit, estab pt      Haylienicanor Aguilar   
[facility]  
Work Phone:   
[phone]                          [facility]  
Work Phone:   
[phone]  
   
                                                    Start: 2022  
End: 2022           FQHC visit, estab pt      Haylie Aguilar   
[facility]  
Work Phone:   
[phone]                          [facility]  
Work Phone:   
[phone]  
   
                                                    Start: 2022  
End: 2022           ambulatory                Julieth Aliya CNP  
Work Phone:   
[phone]                          [facility]  
Work Phone:   
[phone]  
   
                                                    Start: 2022  
End: 2021           General                   Julieth Aliya CNP  
Work Phone:   
[phone]                          [facility]  
Work Phone:   
[phone]  
   
                                                    Start: 10-  
End: 10-     ambulatory          [facility]  
l  
   
                                                    Start: 10-  
End: 10-                         Subsequent hospital visit   
by physician              [facility] Ekg Monitor Rm        [facility]  
   
                                        Comment on above:   Tachycardia   
   
                                                    Start: 2021  
End: 2021           FQHC visit, estab pt      Avis Felder   
[facility]  
Work Phone:   
[phone]                          [facility]  
Work Phone:   
[phone]  
   
                                                    Start: 2021  
End: 2021           FQHC visit, estab pt      Avis Felder   
[facility]  
Work Phone:   
[phone]                          [facility]  
Work Phone:   
[phone]  
   
                                                    Start: 09-  
End: 2021                         Evaluation and management   
of inpatient              DAVID BARNES [facility]  
   
                                                    Start: 2021  
End: 09-                         [facility]   
[facility]  
   
                                                    Start: 2021  
End: 2021                         Emergency department   
patient visit                           Seth Rahman MD  
Work Phone:   
[phone]                          [facility]   
ED  
   
                                        Comment on above:   Bipolar 1 disorder (  
HCC) (Primary Dx);  
Homicidal ideation   
   
                                                    Start: 2021  
End: 2021           FQ visit, estab pt      Leonie HUTCHINSON  
Work Phone:   
[phone]                          [facility]  
Work Phone:   
[phone]  
   
                                                    Start: 2021  
End: 2021                         Emergency department   
patient visit             MALIKA DARNELL [facility]  
   
                                                    Start: 2021  
End: 2021                         Emergency department   
patient visit                           Malika Shepherd MD  
Work Phone:   
[phone]                          [facility]   
ED  
   
                                        Comment on above:   Anxiety state (Prima  
ry Dx)   
   
                                                    Start: 2021  
End: 2021           ambulatory                Pamela Maguire CNP  
Work Phone:   
[phone]                          [facility]  
Work Phone:   
[phone]  
   
                                                    Start: 2021  
End: 2021           General                   Pamela Maguire CNP  
Work Phone:   
[phone]                          [facility]  
Work Phone:   
[phone]  
   
                                                    Start: 2021  
End: 2021           FQHC visit, estab pt      Leonie Garrett LISSARAH  
Work Phone:   
[phone]                          [facility]  
Work Phone:   
[phone]  
   
                                                    Start: 2021  
End: 2021           FQHC visit, estab pt      Leonie Garrett LISWS  
Work Phone:   
[phone]                          [facility]  
Work Phone:   
[phone]  
   
                                                    Start: 2021  
End: 2021                         Emergency department   
patient visit             [facility]  
   
                                                    Start: 2021  
End: 2021                         Emergency department   
patient visit                           [facility] DO  
Work Phone:   
[phone]                          [facility]   
ED  
   
                                        Comment on above:   Depression with suic  
idal ideation (Primary Dx)   
   
                                                    Start: 06-  
End: 06-           FQHC visit, estab pt      Leonie Garrett LISSARAH  
Work Phone:   
[phone]                          [facility]  
Work Phone:   
[phone]  
   
                                                    Start: 06-  
End: 06-           FQHC visit, estab pt      Leonie Short LISWS  
Work Phone:   
[phone]                          [facility]  
Work Phone:   
[phone]  
   
                                                    Start: 2021  
End: 2021           FQHC visit, estab pt      Leonie Short LISWS  
Work Phone:   
[phone]                          [facility]  
Work Phone:   
[phone]  
   
                                                    Start: 2021  
End: 2021           FQHC visit new patient    Doreen Cabrera CNP  
Work Phone:   
[phone]                          [facility]  
Work Phone:   
[phone]  
   
                                                    Start: 08-  
End: 08-                         Patient encounter   
procedure                 ANYA OLIVARES           Facility:  
   
                                                    Start: 2017  
End: 2017                         Emergency department   
patient visit             MD HALLE ANGEL     Facility:[facility]  
  
  
  
Procedures  
  
  
                          Date         Procedure    Procedure Detail Performing   
Clinician  
   
                                        Start: 09-   Application of silve  
r   
diamine fluoride by   
physician                                           Doreen Cabrera CNP  
Work Phone:   
[phone]  
   
                                        Start: 2024   Behav assmt w/score   
&   
docd/stand instrument                               Radha Young LSW  
Work Phone:   
[phone]  
   
                                        Start: 2024   Eye img vld mtch dx   
7   
stnd fld w/o evc rtnopthy                           Doreen Cabrera CNP  
Work Phone:   
[phone]  
   
                                        Start: 2024   Foot examination   
performed                                           Doreen Cabrera CNP  
Work Phone:   
[phone]  
   
                                        Start: 2024   Fundus photography   
w/interpretation & report                           Doreen Cabrera CNP  
Work Phone:   
[phone]  
   
                                        Start: 2024   Gluc bld gluc mntr d  
ev   
cleared fda spec home use                           Doreen Cabrera CNP  
Work Phone:   
[phone]  
   
                                        Start: 2024   Most recent diastoli  
c   
blood pressure 80-89 mm   
hg                                                  Doreen Cabrera CNP  
Work Phone:   
[phone]  
   
                                        Start: 2024   Most recent systolic  
   
blood press 130-139mm hg                            Doreen Cabrera CNP  
Work Phone:   
[phone]  
   
                                        Start: 2024   Collection venous bl  
ood   
venipuncture                                        Doreen Cabrera CNP  
Work Phone:   
[phone]  
   
                                        Start: 2024   Current tobacco non-  
user   
cad cap copd pv dm                                  Doreen Cabrera CNP  
Work Phone:   
[phone]  
   
                                        Start: 2024   Gluc bld gluc mntr d  
ev   
cleared fda spec home use                           Doreen Cabrera CNP  
Work Phone:   
[phone]  
   
                                        Start: 2024   Most recent hg a1c>e  
qual   
to 7.0%&<8.0%                                       Doreen Cabrera CNP  
Work Phone:   
[phone]  
   
                          Start: 2024 Pneumococcal Prevnar 20               
 Doreen Cabrera CNP  
Work Phone:   
[phone]  
   
                                        Start: 2024   Psychotherapy w/tabatha  
ent   
30 minutes                                          Leonie Short LISWS  
Work Phone:   
[phone]  
   
                                        Start: 2024   Behav assmt w/score   
&   
docd/stand instrument                               Leonie Short LISWS  
Work Phone:   
[phone]  
   
                                        Start: 2024   Current tobacco non-  
user   
cad cap copd pv dm                                  Doreen Cabrera CNP  
Work Phone:   
[phone]  
   
                                        Start: 2024   Most recent diastoli  
c   
blood pressure 80-89 mm   
hg                                                  Doreen Cabrera CNP  
Work Phone:   
[phone]  
   
                                        Start: 2024   Most recent systolic  
   
blood press 130-139mm hg                            Doreen Cabrera CNP  
Work Phone:   
[phone]  
   
                                        Start: 2024   Pt scrnd tobacco use  
 rcvd   
tobacco cessation talk                              Doreen Cabrera CNP  
Work Phone:   
[phone]  
   
                                        Start: 2024   Current tobacco non-  
user   
cad cap copd pv dm                                  Doreen Cabrera CNP  
Work Phone:   
[phone]  
   
                                        Start: 2024   Gluc bld gluc mntr d  
ev   
cleared fda spec home use                           Doreen Cabrera CNP  
Work Phone:   
[phone]  
   
                                        Start: 2024   Most recent diastoli  
c   
blood pressure 80-89 mm   
hg                                                  Doreen Cabrera CNP  
Work Phone:   
[phone]  
   
                                        Start: 2024   Most recent hemoglob  
in   
a1c level < 7.0%                                    Doreen Cabrera CNP  
Work Phone:   
[phone]  
   
                                        Start: 2024   Most recent systolic  
   
blood press 130-139mm hg                            Doreen Cabrera CNP  
Work Phone:   
[phone]  
   
                                        Start: 2024   Psychotherapy w/tabatha  
ent   
30 minutes                                          Leonie HUTCHINSON  
Work Phone:   
[phone]  
   
                                        Start: 2024   Pt scrnd tobacco use  
 rcvd   
tobacco cessation talk                              Doreen Cabrera CNP  
Work Phone:   
[phone]  
   
                                Start: 2024 Screening for disorder Visit F  
or: Screening   
For Disorder                            Leonie HUTCHINSON  
Work Phone:   
[phone]  
   
                                        Start: 2024   Venereal disease   
screening                               Visit For: Screening   
Exam Std                                Doreen Cabrera CNP  
Work Phone:   
[phone]  
   
                                        Start: 2023   resin-based composit  
e -   
one surface, posterior                              Brandy Beaulieu DDS  
Work Phone:   
[phone]  
   
                                        Start: 2023   resin-based composit  
e -   
one surface, posterior                              Dougeen Brittnee DDS  
Work Phone:   
[phone]  
   
                                        Start: 2023   Hemoglobin glycosyla  
geraldine   
a1c                                                 Doreen Cabrera [facility]  
Work Phone:   
[phone]  
   
                                        Start: 2023   Most recent diastoli  
c   
blood pressure < 80 mm hg                           Doreen Cabrera [facility]  
Work Phone:   
[phone]  
   
                                        Start: 2023   Most recent hemoglob  
in   
a1c level < 7.0%                                    Doreen Cabrera [facility]  
Work Phone:   
[phone]  
   
                                        Start: 2023   Most recent systolic  
   
blood pressure <130 mm hg                           Doreen Cabrera [facility]  
Work Phone:   
[phone]  
   
                                        Start: 07-   caries risk assessme  
nt   
and documentation, with a   
finding of high risk                                Yue Ronquillo [facility]  
Work Phone:   
[phone]  
   
                                        Start: 07-   intraoral - complete  
   
series of radiographic   
images                                              Yue Ronquillo [facility]  
Work Phone:   
[phone]  
   
                                        Start: 07-   panoramic radiograph  
ic   
image                                               Yue Ronquillo RD  
Work Phone:   
[phone]  
   
                          Start: 07- prophylaxis - adult              Tommy Ronquillo RD  
Work Phone:   
[phone]  
   
                                        Start: 10-   Most recent diastoli  
c   
blood pressure 80-89 mm   
hg                                                  Doreen Cabrera [facility]  
Work Phone:   
[phone]  
   
                                        Start: 10-   Most recent systolic  
   
blood pressure <130 mm hg                           Doreen Cabrera [facility]  
Work Phone:   
[phone]  
   
                                        Start: 2022   Psychotherapy w/tabatha  
ent   
30 minutes                                          Haylie Aguilar [facility]  
Work Phone:   
[phone]  
   
                                        Start: 2022   Most recent diastoli  
c   
blood pressure 80-89 mm   
hg                                                  Julieth Pisano CNP  
Work Phone:   
[phone]  
   
                                        Start: 2022   Most recent systolic  
   
blood pressure <130 mm hg                           Julieth Aliya CNP  
Work Phone:   
[phone]  
   
                                        Start: 2022   Psychotherapy w/tabatha  
ent   
30 minutes                                          Haylie Aguilar [facility]  
Work Phone:   
[phone]  
   
                                        Start: 2022   Most recent diastol   
blood   
pres >/equal 90 mm hg                               Doreen Cabrera CNP  
Work Phone:   
[phone]  
   
                                        Start: 2022   Most recent systolic  
   
blood pressure <130 mm hg                           Doreen Cabrera CNP  
Work Phone:   
[phone]  
   
                                        Start: 2022   Psychotherapy w/tabatha  
ent   
30 minutes                                          Haylie Aguilar [facility]  
Work Phone:   
[phone]  
   
                                        Start: 2022   Hemoglobin glycosyla  
geraldine   
a1c                                                 Julieth Pisano [facility]  
Work Phone:   
[phone]  
   
                                        Start: 2022   Most recent diastol   
blood   
pres >/equal 90 mm hg                               Julieth Pisano [facility]  
Work Phone:   
[phone]  
   
                                        Start: 2022   Most recent hemoglob  
in   
a1c level < 7.0%                                    Julieth Pisano [facility]  
Work Phone:   
[phone]  
   
                                        Start: 2022   Most recent systolic  
   
blood pres>/equal 140 mm   
hg                                                  Julieth Pisano CNP  
Work Phone:   
[phone]  
   
                                        Start: 2022   Psychotherapy w/tabatha  
ent   
30 minutes                                          Haylie Aguilar [facility]  
Work Phone:   
[phone]  
   
                                        Start: 2021   Antibody hiv-1&hiv-2  
   
single result                                       Doreen Cabrera CNP  
Work Phone:   
[phone]  
   
                                        Start: 2021   Most recent diastoli  
c   
blood pressure 80-89 mm   
hg                                                  Doreen Cabrera CNP  
Work Phone:   
[phone]  
   
                                        Start: 2021   Most recent systolic  
   
blood press 130-139mm hg                            Doreen Cabrera CNP  
Work Phone:   
[phone]  
   
                                        Start: 2021   Psychotherapy w/tabatha  
ent   
30 minutes                                          Avis Felder   
[facility]  
Work Phone:   
[phone]  
   
                                        Start: 2021   Pt scrnd tobacco use  
 rcvd   
tobacco cessation talk                              Doreen Cabrera CNP  
Work Phone:   
[phone]  
   
                          Start: 2021 COVID-19, RAPID              Seth Rahman MD  
Work Phone:   
[phone]  
   
                          Start: 2021 Drug screen class list a              
  Seth Rahman MD  
Work Phone:   
[phone]  
   
                                        Start: 2021   Urnls dip stick/tabl  
et   
reagent auto microscopy                             Seth Rahman MD  
Work Phone:   
[phone]  
   
                          Start: 2021 Assay of acetaminophen                
Seth Rahman MD  
Work Phone:   
[phone]  
   
                          Start: 2021 Assay of ethanol              Meghna Rahman MD  
Work Phone:   
[phone]  
   
                          Start: 2021 Assay of salicylate              Cip  
alysia Rahman MD  
Work Phone:   
[phone]  
   
                                        Start: 2021   Comprehensive metabo  
lic   
panel                                               Seth Rahman MD  
Work Phone:   
[phone]  
   
                                        Start: 2021   Ecg routine ecg w/le  
ast   
12 lds w/i&r                                        Seth Rahman MD  
Work Phone:   
[phone]  
   
                                        Start: 2021   Most recent diastoli  
c   
blood pressure < 80 mm hg                           Doreen Cabrera [facility]  
Work Phone:   
[phone]  
   
                                        Start: 2021   Most recent systolic  
   
blood pressure <130 mm hg                           Doreen Cabrera [facility]  
Work Phone:   
[phone]  
   
                                        Start: 2021   Psychotherapy w/tabatha  
ent   
30 minutes                                          Leonie Short [facility]  
Work Phone:   
[phone]  
   
                          Start: 2021 COVID-19, RAPID              Malika Shepherd MD  
Work Phone:   
[phone]  
   
                          Start: 2021 Assay of acetaminophen                
Malika Shepherd MD  
Work Phone:   
[phone]  
   
                          Start: 2021 Assay of ethanol              Malika Shepherd MD  
Work Phone:   
[phone]  
   
                          Start: 2021 Assay of salicylate              Maykel Shepherd MD  
Work Phone:   
[phone]  
   
                                        Start: 2021   Comprehensive metabo  
lic   
panel                                               Malika Shepherd MD  
Work Phone:   
[phone]  
   
                          Start: 2021 Drug screen class list a              
  Malika Shepherd MD  
Work Phone:   
[phone]  
   
                                        Start: 2021   Urinalysis microscop  
ic   
only                                                Malika Shepherd MD  
Work Phone:   
[phone]  
   
                                        Start: 2021   Urnls dip stick/tabl  
et   
rgnt auto w/o microscopy                            Malika Shepherd MD  
Work Phone:   
[phone]  
   
                                        Start: 2021   Most recent diastoli  
c   
blood pressure 80-89 mm   
hg                                                  Doreen Deborah CNP  
Work Phone:   
[phone]  
   
                                        Start: 2021   Most recent systolic  
   
blood pressure <130 mm hg                           Doreen Deborah CNP  
Work Phone:   
[phone]  
   
                                        Start: 2021   Psychotherapy w/tabatha  
ent   
30 minutes                                          Leonie Short [facility]  
Work Phone:   
[phone]  
   
                                        Start: 2021   Ecg routine ecg w/le  
ast   
12 lds w/i&r                                        Rafael Andes DO  
Work Phone:   
[phone]  
   
                          Start: 2021 Assay of acetaminophen                
Rafael Andes DO  
Work Phone:   
[phone]  
   
                          Start: 2021 Assay of ethanol              Rafael  
 Andes DO  
Work Phone:   
[phone]  
   
                          Start: 2021 Assay of salicylate              Jus  
tin Andes DO  
Work Phone:   
[phone]  
   
                                        Start: 2021   Comprehensive metabo  
lic   
panel                                               Rafael Andes DO  
Work Phone:   
[phone]  
   
                          Start: 2021 SPECIMEN REJECTION              Just  
in Andes DO  
Work Phone:   
[phone]  
   
                          Start: 2021 COVID-19, RAPID              Rafael   
Andes DO  
Work Phone:   
[phone]  
   
                          Start: 2021 Drug screen class list a              
  Rafael Andes DO  
Work Phone:   
[phone]  
   
                                        Start: 2021   Urnls dip stick/tabl  
et   
reagent auto microscopy                             Seth Rahman MD  
Work Phone:   
[phone]  
   
                                        Start: 06-   Most recent diastoli  
c   
blood pressure 80-89 mm   
hg                                                  Doreen Cabrera CNP  
Work Phone:   
[phone]  
   
                                        Start: 06-   Most recent systolic  
   
blood pressure <130 mm hg                           Doreen Cabrera CNP  
Work Phone:   
[phone]  
   
                                        Start: 06-   Psychotherapy w/tabatha  
ent   
30 minutes                                          Leonie Short [facility]  
Work Phone:   
[phone]  
   
                                        Start: 2021   Ear Pressure Equaliz  
ation   
Tube, Insertion,   
Bilaterally                                         Doreen Cabrera CNP  
Work Phone:   
[phone]  
   
                                        Start: 2021   Most recent diastol   
blood   
pres >/equal 90 mm hg                               Doreen Cabrera CNP  
Work Phone:   
[phone]  
   
                                        Start: 2021   Most recent systolic  
   
blood pres>/equal 140 mm   
hg                                                  Doreen Cabrera CNP  
Work Phone:   
[phone]  
   
                                        Start: 2021   Pt-focused hlth risk  
   
assmt score doc stnd   
instrm                                              Doreen Cabrera CNP  
Work Phone:   
[phone]  
   
                          Start: 2021 Tonsillectomy              Doreenpaola cuevas CNP  
Work Phone:   
[phone]  
  
  
  
Plan of Treatment  
  
  
                          Date         Care Activity Detail       Author  
   
                                Start: 10- FQHC visit, estab pt Medical E  
stablished   
Patient                                 [facility]  
Work Phone:   
[phone]  
   
                                        Start: 10-   CBC W Auto Different  
ial   
panel - Blood                                       [facility]  
   
                                Start: 2024 FQHC visit, estab pt Medical E  
stablished   
Patient                                 [facility]  
Work Phone:   
[phone]  
   
                                                    Start: 2024  
End: 2024                         Patient education based   
on identified need                                  [facility]  
   
                          Start: 2024              US Transvaginal ([id number])  
 [facility]  
   
                                                    Start: 2024  
End: 2024                         Patient education based   
on identified need                                  [facility]  
   
                                Start: 2024 FQHC visit, estab pt Medical E  
stablished   
Patient                                 [facility]  
Work Phone:   
[phone]  
   
                                                    Start: 2024  
End: 2024                         Patient education based   
on identified need                                  [facility]  
   
                                        Start: 2024   Ferritin [Mass/volum  
e]   
in Serum or Plasma                                  [facility]  
   
                                                    Start: 2024  
End: 2024                         Patient education based   
on identified need                                  [facility]  
   
                                Start: 2024 FQHC visit, estab pt Medical E  
stablished   
Patient                                 [facility]  
Work Phone:   
[phone]  
   
                                Start: 2024                 All 3 Urine Te  
st   
Gonorrhea-Chlamydia-Tri  
[facility]  
   
                                                    Start: 2024  
End: 2024                         Patient education based   
on identified need                                  [facility]  
   
                                                    Start: 2023  
End: 2023                         Patient education based   
on identified need                                  [facility]  
   
                          Start: 2023              Psychiatry   [facility]  
Work Phone:   
[phone]  
   
                                        Comment on above:   Note: Please make a   
referral to: see if dr alonso accepting   
now,   
otherwise ok with arminda or edy   
   
                                Start: 2023 FQHC visit, estab pt Medical E  
stablished   
Patient                                 [facility]  
Work Phone:   
[phone]  
   
                                                    Start: 10-  
End: 10-                         Patient education based   
on identified need                                  [facility]  
   
                                Start: 2022                 All 3 Urine Te  
st   
Gonorrhea-Chlamydia-Tri  
[facility]  
   
                                Start: 09- FQHC visit, estab pt Medical E  
stablished   
Patient                                 [facility]  
Work Phone:   
[phone]  
   
                                                    Start: 2022  
End: 2022                         Patient education based   
on identified need                      [facility] offered active   
listening and   
supportive feedback;   
normalized emotions and   
feelings, also provided   
pt time to process any   
current stressors.   
~Promoted and   
encouraged   
follow-through with   
scheduling psychiatric   
services                                [facility]  
   
                                                    Start: 2022  
End: 2022                         Patient education based   
on identified need                                  [facility]  
   
                          Start: 2022                           [facility]  
   
                          Start: 2022 FQHC visit, estab pt              Ti  
in [facility]  
Work Phone:   
[phone]  
   
                                        Comment on above:   Note: Please make a   
referral to: request dr alonso   
   
                                                    Start: 2022  
End: 2022                         Patient education based   
on identified need                                  [facility]  
   
                                                    Start: 2022  
End: 2022                         Patient education based   
on identified need                      [facility]   
[facility]   
[facility] ~[facility] offered   
active listening and   
supportive feedback;   
normalized emotions and   
feelings, also provided   
pt time to process any   
current stressors ~P   
discussed potential   
benefits of counseling   
and supported   
re-engaging, as needed.   
~[facility] encouraged pt to   
continue to make time   
to implement self-care   
regimen and use coping   
methods, as needed                      [facility]  
   
                                        Start: 2022   CBC W Auto Different  
ial   
panel - Blood                                       [facility]  
   
                                        Start: 2022   Lipid 1996 panel - S  
wayne   
or Plasma                 LIPID PROFILE             [facility]  
   
                                                    Start: 2022  
End: 2022                         Patient education based   
on identified need                                  [facility]  
   
                          Start: 2022 Influenza vaccination Flu vaccine (#  
1) [facility]  
   
                                        Start: 2021   COVID-19 Vaccine (3   
-   
Booster for Pfizer   
series)                                 COVID-19 Vaccine (3 -   
Booster for Pfizer   
series)                                 [facility]  
   
                          Start: 10-                           [facility]  
   
                                Start: 2021 FQ visit, estab pt Medical E  
stablished   
Patient                                 [facility]  
Work Phone:   
[phone]  
   
                          Start: 2021                           [facility]  
Work Phone:   
[phone]  
   
                                        Comment on above:   Note: Please make a   
referral to: [facility] -   
[address] [address]IH: [phone]:   
[phone]   
   
                                                            Note: Please make a   
referral to: Addison bailey , no longer wants to   
see vinny   
   
                                                    Start: 2021  
End: 2021                         Patient education based   
on identified need                                  [facility]  
   
                          Start: 2021 Influenza vaccination              [facility]  
Work Phone:   
[phone]  
   
                                                    Start: 2021  
End: 2021                         Patient education based   
on identified need                                  [facility]  
   
                          Start: 2021              SARS-CoV-2, [facility]   
[facility]  
   
                                                    Start: 2021  
End: 2021                         Patient education based   
on identified need                                  [facility]  
   
                                                    Start: 06-  
End: 06-                         Patient education based   
on identified need                                  [facility]  
   
                                                    Start: 2021  
End: 2021                         Patient education based   
on identified need                                  [facility]  
   
                                        Start: 2020   DTaP/Tdap/Td vaccine  
 (7   
- Td or Tdap)                           DTaP/Tdap/Td vaccine (7   
- Td or Tdap)                           [facility]  
   
                                        Start: 2020   Screening for malign  
ant   
neoplasm of cervix                                  [facility]  
   
                                        Start: 2018   DTaP/Tdap/Td vaccine  
 (1   
- Tdap)                                 DTaP/Tdap/Td vaccine (1   
- Tdap)                                 [facility]  
Work Phone:   
[phone]  
   
                          Start: 2017 Hepatitis C screening Hepatitis C sc  
reen [facility]  
   
                                        Start: 2015   Screening for Chlamy  
argelia   
trachomatis                                         [facility]  
   
                          Start: 2014 HIV screening HIV screen   [facility]  
Work Phone:   
[phone]  
   
                          Start: 2011 COVID-19 Vaccine (1) COVID-19 Vaccin  
e (1) [facility]  
Work Phone:   
[phone]  
   
                          Start: 2011 Depression Monitoring Depression [facility]  
   
                                        Start: 2010   HPV vaccine (1 - 2-d  
ose   
series)                                 HPV vaccine (1 - 2-dose   
series)                                 [facility]  
   
                                        Start: 2000   Varicella vaccine (1  
 of   
2 - 2-dose childhood   
series)                                 Varicella vaccine (1 of   
2 - 2-dose childhood   
series)                                 [facility]  
   
                          Start: 1999 Hepatitis C screening Hepatitis C sc  
reen [facility]  
Work Phone:   
[phone]  
   
                                                      
End: 10-                         C.trachomatis   
N.gonorrhoeae DNA, Urine                            [facility]  
Work Phone:   
[phone]  
   
                                        Comment on above:   Once for 1 Occurrenc  
es starting 10/21/2022 until 10/21/2022   
   
                                                EKG 12 Lead     EKG 12 Lead ECG   
STAT   
2021 3:18 PM EDT                  [employer]  
Work Phone:   
[phone]  
   
                                                      
End: 10-                         Trichomonas Vaginali,   
Molecular                                           [facility]  
Work Phone:   
[phone]  
   
                                        Comment on above:   Once for 1 Occurrenc  
es starting 10/21/2022 until 10/21/2022   
  
  
  
Immunizations  
  
  
                      Immunization Date Immunization Notes      Care Provider Mandeep deshpande  
   
                                        2024          influenza, injectabl  
e,   
quadrivalent,   
preservative free;   
Translations: [Fluarix]                             Doreen Cabrera [facility]  
Work Phone:   
[phone]                          [facility]  
   
                                        Comment on above:   Note: Patient tolera  
geraldine well. No signs or symptoms of adverse   
reactions. Patient waited a minimum of 15 minutes.   
   
                                        2024          Imm.Admin. over 18 y  
rs   
Any Route FIRST Injection   
(Rendering physician   
modifier)                                           Doreen Cabrera [facility]  
Work Phone:   
[phone]                          [facility]  
   
                                        2024          Human Papillomavirus  
   
9-valent vaccine;   
Translations: [GARDASIL   
9]                                                  Doreen Cabrera [facility]  
Work Phone:   
[phone]                          [facility]  
   
                                        2024          pneumococcal vaccine  
,   
unspecified formulation                             Doreen Cabrera [facility]  
Work Phone:   
[phone]                          [facility]  
   
                                        Comment on above:   Note: Patient tolera  
geraldine well. No signs or symptoms of adverse   
reactions. Patient waited a minimum of 15 minutes.   
   
                                        2024          Imm.Admin.Through 17  
 yrs   
Any Route FIRST Injection                           Doreen Cabrera CNP  
Work Phone:   
[phone]                          [facility]  
   
                                        2024          Ea.Addnl.Imm.Admin.T  
hroug  
h 17 yrs Any Route                                  Doreen Cabrera CNP  
Work Phone:   
[phone]                          [facility]  
   
                                        2024          VFC Imm. Admin. thro  
ugh   
17 yrs Any Route per VFC   
Injection (Signi/Sep   
Eval. & Man.)                                       Doreen Cabrera [facility]  
Work Phone:   
[phone]                          [facility]  
   
                                        10-          influenza, injectabl  
e,   
quadrivalent,   
preservative free;   
Translations: [Fluarix]                             Doreen Cabrera [facility]  
Work Phone:   
[phone]                          [facility]  
   
                                        Comment on above:   Note: Patient tolera  
geraldine well. No signs or symptoms of adverse   
reactions. Patient waited a minimum of 15 minutes.   
   
                                        10-          Imm.Admin. over 17 y  
rs   
Any Route FIRST Injection                           Doreen Cabrera [facility]  
Work Phone:   
[phone]                          [facility]  
   
                                        2021          1st Dose PFIZER COVI  
D-19   
Vaccine                                             Doreen Cabrera [facility]  
Work Phone:   
[phone]                          [facility]  
   
                                        2021          1st Dose PFIZER COVI  
D-19   
Vaccine                                             Doreen Cabrera [facility]  
Work Phone:   
[phone]                          [facility]  
   
                                        2016          meningococcal   
oligosaccharide (groups   
A, C, Y and W-135)   
diphtheria toxoid   
conjugate vaccine (MCV4O)                           Doreen Cabrera [facility]  
Work Phone:   
[phone]                          [facility]  
   
                                        2010          tetanus toxoid, redu  
kyleigh   
diphtheria toxoid, and   
acellular pertussis   
vaccine, adsorbed                                   Doreen Cabrera [facility]  
Work Phone:   
[phone]                          [facility]  
   
                                        2004          measles, mumps and   
rubella virus vaccine                               Doreen Cabrera [facility]  
Work Phone:   
[phone]                          [facility]  
   
                                        2004          poliovirus vaccine,   
inactivated                                         Doreen Cabrera [facility]  
Work Phone:   
[phone]                          [facility]  
   
                                        2000          measles, mumps and   
rubella virus vaccine                               Doreen Cabrera [facility]  
Work Phone:   
[phone]                          [facility]  
   
                                        2000          poliovirus vaccine,   
inactivated                                         Doreen Cabrera [facility]  
Work Phone:   
[phone]                          [facility]  
   
                                        2000          haemophilus influenz  
ae   
type b conjugate and   
Hepatitis B vaccine                                 Doreen Cabrera [facility]  
Work Phone:   
[phone]                          [facility]  
   
                                        1999          poliovirus vaccine,   
inactivated                                         Doreen Deborah CNP  
Work Phone:   
[phone]                          [facility]  
   
                                        1999          diphtheria, tetanus   
toxoids and pertussis   
vaccine                                             Doreen Cabrera CNP  
Work Phone:   
[phone]                          [facility]  
   
                                        1999          trivalent poliovirus  
   
vaccine, live, oral                                 Doreen Cabrera CNP  
Work Phone:   
[phone]                          [facility]  
   
                                        1999          hepatitis B vaccine,  
   
pediatric or   
pediatric/adolescent   
dosage                                              Doreen Cabrera CNP  
Work Phone:   
[phone]                          [facility]  
   
                                        1999          hepatitis B vaccine,  
   
pediatric or   
pediatric/adolescent   
dosage                                              Doreen Cabrera CNP  
Work Phone:   
[phone]                          [facility]  
  
  
  
Payers  
  
  
                          Date         Payer Category Payer        Policy ID  
   
                          2021   Unknown                   [id number]   
[id number]  
   
                          1999   Unknown                   [id number] 2.16.84  
[id number]  
   
                          1999   Unknown                   [id number] [id number]  
[id number]  
   
                          1999   Unknown                   [id number] 2.16.8  
[id number]  
   
                          1999   Unknown                   [id number] [id number]  
[id number]  
   
                          1999   Unknown                   [id number] 2.16.8  
[id number]  
   
                          1999   Unknown                   [id number] [id number]  
[id number]  
   
                          1999   Unknown                   [id number] 2.16.  
[id number]  
   
                          1999   Unknown                   [id number] 2.16.8  
[id number]  
   
                          1999   Unknown                   [id number] 2.16.8  
[id number]  
   
                          1999   Unknown                   [id number] [id number]  
[id number]  
   
                          1960   Private Health Insurance              [id number]  
   
                                       Unknown                   [id number]  
  
  
  
Social History  
  
  
                          Date         Type         Detail       Facility  
   
                                       Assertion    Family problems (finding) [facility]  
[facility]  
   
                                                [facility]       Problem situatio  
n   
relating to social and   
personal history   
(finding)                               [facility]  
   
                                                [facility]       Emotional stress  
   
(finding)                               [facility]  
   
                                                [facility]       Lives with paren  
ts   
(finding)                               [facility]  
   
                                       [facility]    Social drinker (finding) [facility]  
[facility]  
   
                                                Assertion       Benzodiazepine m  
isuse   
(finding)                               [facility]  
   
                                       [facility]    Sexually active (finding) [facility]  
[facility]                 [facility]  
   
                                                [facility]       Gender identity   
finding   
(finding)                               [facility]  
   
                                                [facility]       Finding of sexua  
l   
orientation (finding)                   [facility]  
   
                                                            Tobacco smoking   
status                    Unknown if ever smoked    [facility]  
Work Phone:   
[phone]  
   
                                                    Start: 10-  
End: 2021                         Tobacco smoking   
status NHIS               Never smoker              [employer] Phone:   
[phone]  
   
                                                    Start: 10-  
End: 2021                         Tobacco use and   
exposure                  Never used                [employer]  
   
                                                    Start: 2021  
End: 2021     Alcohol intake      Ex-drinker (finding) [employer] Phone:   
[phone]  
   
                                        Start: 2021   History SDOH Alcohol  
   
Frequency                 1                         [employer] Phone:   
[phone]  
   
                                        Start: 1999   Sex Assigned At   
Birth                     Not on file               [employer] Phone:   
[phone]  
   
                                                            Exposure to   
SARS-CoV-2 (event)        Not sure                  [employer]       Smoking monitori  
ng status   
(finding)                               [facility]  
Work Phone:   
[phone]  
   
                                                Assertion       Cigarette smoker  
   
(finding)                               [facility]  
   
                                                [facility]       Light cigarette   
smoker   
(1-9 cigs/day) (finding)                [facility]  
   
                                                [facility]       Exposure to poll  
ution   
(event)                                 [facility]  
   
                                       [facility]    [facility] (finding) [facility]  
   
                                                [facility]       Finding relating  
 to   
sexual activity (finding)               [facility]  
   
                                                Assertion       Homosexual behav  
ior   
(finding)                               [facility]  
   
                                                Assertion       Currently not se  
xually   
active (finding)                        [facility]  
   
                                                Assertion       Details of drug   
misuse   
behavior (observable   
entity)                                 [facility]  
   
                                                Assertion       History of disor  
danny   
(situation)                             [facility]  
   
                                                    NEGATED: Highlighted   
row                       Assertion                 Current drinker of   
alcohol (finding)                       [facility]  
   
                                                    NEGATED: Highlighted   
row                       Assertion                 Finding relating to drug   
misuse behavior (finding)               [facility]  
   
                                                    NEGATED: Highlighted   
row                 Assertion                               [facility]  
   
                                                    NEGATED: Highlighted   
row                       Assertion                 Exposure to pollution   
(event)                                 [facility]  
  
  
  
Medical Equipment  
  
  
                                Procedure Code  Equipment Code  Equipment Origin  
al   
Text                                    Equipment   
Identifier                              Dates  
   
                                                                Blood Glucose Te  
st In   
Vitro Strip               [id number]                  Start:   
2024  
End:   
2024  
   
                                                                [employer]  
   
Miscellaneous             [id number]                  Start:   
2024  
   
                                                                OneTouch Ultra I  
n   
Vitro Strip               [id number]                  Start:   
2024  
End:   
2025  
  
  
  
Mental Status  
  
  
                          Date         Assessment   Result       Facility  
   
                                2020      Cognitive function A previous haider  
icide attempt    
with hospitalization at [address] [place] [address]  
Work Phone:   
[phone]  
   
                                                Cognitive function Cognitive fun  
ctioning was   
normal Cognitive function   
finding (finding)                       [facility]  
Work Phone:   
[phone]  
  
  
  
Clinical Notes 2021 to 10-  
  
  
                                Note Date & Type Note            Facility  
  
  
  
                                        10- Evaluation note   
  
  
Includes: Assessments for all patient encounters  
  
  
  
                                                    Findings  
   
                                                    [D50.9 - Iron deficiency ane  
jennifer,   
unspecified] iron deficiency   
anemia                                  Chart Update with Doreen Cabrera CNP                              10/07/2024  
  
  
  
                                                    Last Documented On 10/09/202  
4 2:06PM ; [facility]  
  
  
  
                                        Attention-deficit hyperactivity disorder  
  [facility]                               2024  
  
  
  
                                                    Last Documented On   
4 4:15PM ; [facility]  
  
  
  
                                                    Bipolar I disorder, most rec  
ent   
episode, depressed - mild                [facility]                               2024  
  
  
  
                                                    Last Documented On   
4 4:15PM ; [facility]  
  
  
  
                                Nicotine dependence  Established Patient with   
[facility] 20[facility]  
  
  
  
                                                    Last Documented On   
4 4:15PM ; [facility]  
  
  
  
                                        Post-traumatic stress disorder  Establ  
ished Patient with Radha Young   
LSW                                     2024  
  
  
  
                                                    Last Documented On   
4 4:15PM ; [facility]  
  
  
  
                                                    [Z68.43 - Body mass index [B  
MI]   
50.0-59.9, adult] assessment of body   
mass index                              Medical Established Patient with   
Doreenpaola Mccormacken CNP                        2024  
  
  
  
                                                    Last Documented On 10/09/202  
4 2:09PM ; [facility]  
  
  
  
                                                    Bipolar I disorder, most rec  
ent   
episode, depressed - mild               Medical Established Patient with Doreen   
Deborah CNP                              2024  
  
  
  
                                                    Last Documented On 10/09/202  
4 2:09PM ; [facility]  
  
  
  
                                Caries          Medical Established Patient with  
 Doreen Deborah CNP 2024  
  
  
  
                                                    Last Documented On 10/09/202  
4 2:09PM ; [facility]  
  
  
  
                                        Encounter for Immunization Medical Estab  
lished Patient with Doreen Deborah   
CNP                                     2024  
  
  
  
                                                    Last Documented On 10/09/202  
4 2:09PM ; [facility]  
  
  
  
                                                    Type 2 diabetes mellitus wit  
hout   
complication                            Medical Established Patient with   
Doreen Deborah CNP                        2024  
  
  
  
                                                    Last Documented On 10/09/202  
4 2:09PM ; [facility]  
  
  
  
                                        Attention-deficit hyperactivity disorder  
  Established Patient with   
Leonie Short [facility]                     2024  
  
  
  
                                                    Last Documented On   
4 3:28PM ; [facility]  
  
  
  
                                                    Bipolar I disorder, most rec  
ent   
episode, depressed - mild                Established Patient with Leonie   
Short [facility]                             2024  
  
  
  
                                                    Last Documented On   
4 3:28PM ; [facility]  
  
  
  
                                        Post-traumatic stress disorder  Establ  
ished Patient with Leonie Short   
LIS[facility]                                   2024  
  
  
  
                                                    Last Documented On   
4 3:28PM ; [facility]  
  
  
  
                                                    [E11.9 - Type 2 diabetes lobito  
litus   
without complications] type 2 diabetes   
mellitus                                Medical Established Patient with   
Doreen Deborah CNP                        2024  
  
  
  
                                                    Last Documented On   
4 7:36PM ; [facility]  
  
  
  
                                                    [F41.1 - Generalized anxiety  
   
disorder] generalized anxiety   
disorder                                Medical Established Patient with   
Doreen Deborah CNP                        2024  
  
  
  
                                                    Last Documented On   
4 7:36PM ; [facility]  
  
  
  
                                                    [I10 - Essential (primary)   
hypertension] essential hypertension    Medical Established Patient with   
Doreen Cabrera CNP                        2024  
  
  
  
                                                    Last Documented On   
4 7:36PM ; [facility]  
  
  
  
                                                    [N94.6 - Dysmenorrhea, unspe  
cified]   
dysmenorrhea                            Medical Established Patient with   
Doreen Cabrera CNP                        2024  
  
  
  
                                                    Last Documented On   
4 7:36PM ; [facility]  
  
  
  
                                                    [Z68.43 - Body mass index [B  
MI]   
50.0-59.9, adult] assessment of body   
mass index                              Medical Established Patient with   
Doreen Cabrera CNP                        2024  
  
  
  
                                                    Last Documented On   
4 7:36PM ; [facility]  
  
  
  
                                        Encounter for Immunization Medical Estab  
lished Patient with Doreen Cabrera   
CNP                                     2024  
  
  
  
                                                    Last Documented On   
4 7:36PM ; [facility]  
  
  
  
                                        Venipuncture was performed Medical Estab  
lished Patient with Doreen Cabrera   
CNP                                     2024  
  
  
  
                                                    Last Documented On   
4 7:36PM ; [facility]  
  
  
  
                                        Attention-deficit hyperactivity disorder  
  Established Patient with   
Leonie Short LISWS                     2024  
  
  
  
                                                    Last Documented On   
4 10:10AM ; [facility]  
  
  
  
                                                    Bipolar I disorder, most rec  
ent   
episode, depressed - mild                Established Patient with Leonie   
Short LISWS                             2024  
  
  
  
                                                    Last Documented On   
4 10:10AM ; [facility]  
  
  
  
                                        Post-traumatic stress disorder BH Establ  
ished Patient with Leonie Short   
LISWS                                   2024  
  
  
  
                                                    Last Documented On   
4 10:10AM ; [facility]  
  
  
  
                                        [D64.9 - Anemia, unspecified] anemia Med  
ical Established Patient with   
Doreen Cabrera CNP                        2024  
  
  
  
                                                    Last Documented On   
4 6:51PM ; [facility]  
  
  
  
                                                    [Z68.43 - Body mass index [B  
MI]   
50.0-59.9, adult] assessment of body   
mass index                              Medical Established Patient with   
Doreen Cabrera CNP                        2024  
  
  
  
                                                    Last Documented On   
4 6:51PM ; [facility]  
  
  
  
                                                    Bipolar affective disorder,   
current   
episode depressed, mild                  Established Patient with Leonie   
Short LISWS                             2024  
  
  
  
                                                    Last Documented On   
4 5:16PM ; [facility]  
  
  
  
                                        Post-traumatic stress disorder BH Establ  
ished Patient with Leonie Short   
LISWS                                   2024  
  
  
  
                                                    Last Documented On   
4 5:16PM ; [facility]  
  
  
  
                                                    Undifferentiated attention d  
eficit   
disorder                                BH Established Patient with   
Leonie Short LISWS                     2024  
  
  
  
                                                    Last Documented On   
4 5:16PM ; [facility]  
  
  
  
                                        Visit for: screening for disorder BH Est  
ablished Patient with Leonie   
Short LISWS                             2024  
  
  
  
                                                    Last Documented On   
4 5:16PM ; [facility]  
  
  
  
                                                    [Z68.43 - Body mass index [B  
MI]   
50.0-59.9, adult] assessment of body   
mass index                              Medical Established Patient with   
Doreen Cabrera CNP                        2024  
  
  
  
                                                    Last Documented On   
4 7:50PM ; [facility]  
  
  
  
                                        Attention-deficit hyperactivity disorder  
 Medical Established Patient with   
Doreen Cabrera CNP                        2024  
  
  
  
                                                    Last Documented On   
4 7:50PM ; [facility]  
  
  
  
                                                    Diabetes Risk Test Score was  
 three   
score 3/27/2024                         Medical Established Patient with Doreen Cabrera CNP                              2024  
  
  
  
                                                    Last Documented On   
4 7:50PM ; [facility]  
  
  
  
                                        Visit for: screening for STD Medical Est  
ablished Patient with Doreen Cabrera   
CNP                                     2024  
  
  
  
                                                    Last Documented On   
4 7:50PM ; [facility]  
  
  
  
                                                    [Z68.32 - Body mass index [B  
MI]   
32.0-32.9, adult] assessment of body   
mass index                              Medical Established Patient with   
Doreen Cabrera CNP                        2023  
  
  
  
                                                    Last Documented On   
3 6:01PM ; [facility]  
  
  
  
                                        Borderline personality disorder Medical   
Established Patient with Doreenpaola Mccormacken CNP                              2023  
  
  
  
                                                    Last Documented On   
3 6:01PM ; [facility]  
  
  
  
                                        Screening for diabetes mellitus Medical   
Established Patient with Doreenpaola Mccormacken CNP                              2023  
  
  
  
                                                    Last Documented On   
3 6:01PM ; [facility]  
  
  
  
                                        Assessment of body mass index Medical Es  
tablished Patient with Doreen Cabrera CNP                              10/21/2022  
  
  
  
                                                    Last Documented On 10/21/202  
2 2:45PM ; [facility]  
  
  
  
                                                    Bipolar affective disorder,   
current   
episode manic                            [facility] with Haylienicanor Aguilar LPCC-S                        2022  
  
  
  
                                                    Last Documented On   
2 3:22PM ; [facility]  
  
  
  
                                                    Bipolar affective disorder,   
current   
episode manic                            Established Patient with Haylie   
Aguilar LPCC-S                        2022  
  
  
  
                                                    Last Documented On   
2 2:28PM ; [facility]  
  
  
  
                                                    Bipolar affective disorder,   
current   
episode depressed, severe with   
psychosis                                [facility] with Haylienicanor Aguilar LPCC-S                        2022  
  
  
  
                                                    Last Documented On   
2 3:29PM ; [facility]  
  
  
  
                                                    Assessment of body mass inde  
x [Body   
mass index [BMI] 50.0-59.9, adult]      Open Access - Established with Julieth   
Aliya CNP                               2022  
  
  
  
                                                    Last Documented On   
2 9:36AM ; [facility]  
  
  
  
                                                    Bipolar I disorder, most rec  
ent   
episode, manic                          Open Access - Established with Julieth   
Aliya CNP                               2022  
  
  
  
                                                    Last Documented On   
2 9:36AM ; [facility]  
  
  
  
                                        Post-traumatic stress disorder  Establ  
ished Patient with Haylienicanor Aguilar   
LPCC-S                                  2022  
  
  
  
                                                    Last Documented On   
2 3:20PM ; [facility]  
  
  
  
                                No cough        Medical Established Patient with  
 Doreen Deborah CNP 2022  
  
  
  
                                                    Last Documented On   
2 4:04PM ; [facility]  
  
  
  
                                                    Z68.43 - Body mass index [BM  
I]   
50.0-59.9, adult                        Medical Established Patient with   
Doreen Deborah CNP                        2022  
  
  
  
                                                    Last Documented On   
2 4:04PM ; [facility]  
  
  
  
                                                    Borderline personality disor  
danny Pt   
reported hx of sx/dx                     Established Patient with Haylienicanor Martinerson LPCC-S                        2022  
  
  
  
                                                    Last Documented On   
2 4:08PM ; [facility]  
  
  
  
                                                    Assessment of body mass inde  
x [Body   
mass index [BMI] 50.0-59.9, adult]      Open Access - Established with Julieth   
Aliya CNP                               2022  
  
  
  
                                                    Last Documented On   
2 7:41PM ; [facility]  
  
  
  
                                                    Diabetes Risk Test Score was  
 three   
score 2022                         Open Access - Established with Julieth   
Aliya CNP                               2022  
  
  
  
                                                    Last Documented On   
2 7:41PM ; [facility]  
  
  
  
                                                    Bipolar I disorder, most rec  
ent   
episode, manic                           Established Patient with Avis   
Felder LPCC-S                          2021  
  
  
  
                                                    Last Documented On   
1 1:35AM ; [facility]  
  
  
  
                                        Borderline personality disorder  Estab  
lished Patient with Avis   
Felder LPCC-S                          2021  
  
  
  
                                                    Last Documented On   
1 1:35AM ; [facility]  
  
  
  
                                        Post-traumatic stress disorder  Establ  
ished Patient with Avis   
Felder LPCC-S                          2021  
  
  
  
                                                    Last Documented On   
1 1:35AM ; [facility]  
  
  
  
                                                    Assessment of visit for: bhargavi guevaraning for   
human immunodeficiency virus            Medical Established Patient with   
Doreen Deborah CNP                        2021  
  
  
  
                                                    Last Documented On   
1 2:56PM ; [facility]  
  
  
  
                                Nicotine dependence Medical Established Patient   
with Doreen Deborah CNP 2021  
  
  
  
                                                    Last Documented On   
1 2:56PM ; [facility]     Medical Established Patient with  
 Doreen Deborah CNP 2021  
  
  
  
                                                    Last Documented On   
1 2:56PM ; [facility]  
  
  
  
                                                    Z68.43 - Body mass index [BM  
I]   
50.0-59.9, adult                        Medical Established Patient with   
Doreen Deborah CNP                        2021  
  
  
  
                                                    Last Documented On   
1 2:56PM ; [facility]  
  
  
  
                                                    Bipolar I disorder, most rec  
ent   
episode, manic                           Established Patient with Leonie   
Short LISWS                             2021  
  
  
  
                                                    Last Documented On   
1 10:17AM ; [facility]  
  
  
  
                                                    Borderline personality disor  
danny per   
patient reported history                BH Established Patient with Leonie   
Short LISWS                             2021  
  
  
  
                                                    Last Documented On   
1 10:17AM ; [facility]  
  
  
  
                                Nicotine dependence  Established Patient with   
Leonie Short LISWS 2021  
  
  
  
                                                    Last Documented On   
1 10:17AM ; [facility]  
  
  
  
                                        Post-traumatic stress disorder  Establ  
ished Patient with Leonie Short   
LISWS                                   2021  
  
  
  
                                                    Last Documented On   
1 10:17AM ; [facility]  
  
  
  
                                Body mass index Medical Established Patient with  
 Doreenpaola Mccormacken CNP 2021  
  
  
  
                                                    Last Documented On   
1 5:23PM ; [facility]  
  
  
  
                                Morbid obesity  Medical Established Patient with  
 Doreen Deborah CNP 2021  
  
  
  
                                                    Last Documented On   
1 5:23PM ; [facility]  
  
  
  
                                        Nicotine dependence uncomplicated Medica  
l Established Patient with Doreen   
Deborah CNP                              2021  
  
  
  
                                                    Last Documented On   
1 5:23PM ; [facility]  
  
  
  
                                                    Z68.42 - Body mass index [BM  
I]   
45.0-49.9, adult                        Medical Established Patient with   
Doreen Deborah CNP                        2021  
  
  
  
                                                    Last Documented On   
1 5:23PM ; [facility]  
  
  
  
                                Episodic mood disorders On Call with Pamela edwards CNP 2021  
  
  
  
                                                    Last Documented On   
1 7:49AM ; [facility]  
  
  
  
                                                    Mood disorders, NOS per tabatha  
ent   
reported history                         Established Patient with Leonie   
Short LISWS                             2021  
  
  
  
                                                    Last Documented On   
1 7:15PM ; [facility]  
  
  
  
                                        Post-traumatic stress disorder  Establ  
ished Patient with Leonie Short   
LISWS                                   2021  
  
  
  
                                                    Last Documented On   
1 7:15PM ; [facility]  
  
  
  
                                Morbid obesity  Medical Established Patient with  
 Doreen Deborah CNP 2021  
  
  
  
                                                    Last Documented On   
1 12:31PM ; [facility]  
  
  
  
                                Otitis externa  Medical Established Patient with  
 Doreen Deborah CNP 2021  
  
  
  
                                                    Last Documented On   
1 12:31PM ; [facility]  
  
  
  
                                                    Z68.42 - Body mass index [BM  
I]   
45.0-49.9, adult                        Medical Established Patient with   
Doreen Deborah CNP                        2021  
  
  
  
                                                    Last Documented On   
1 12:31PM ; [facility]  
  
  
  
                                        Post-traumatic stress disorder  Establ  
ished Patient with Leonie Short   
LISWS                                   06/10/2021  
  
  
  
                                                    Last Documented On 06/10/202  
1 10:05PM ; [facility]  
  
  
  
                                Morbid obesity  Medical Established Patient with  
 Doreen Cabrera CNP 06/10/2021  
  
  
  
                                                    Last Documented On 06/10/202  
1 4:45PM ; [facility]  
  
  
  
                                                    Z68.42 - Body mass index [BM  
I]   
45.0-49.9, adult                        Medical Established Patient with   
Doreenpaola Cabrera CNP                        06/10/2021  
  
  
  
                                                    Last Documented On 06/10/202  
1 4:45PM ; [facility]  
  
  
  
                                        Post-traumatic stress disorder  Establ  
ished Patient with Leonie Short   
LISWS                                   2021  
  
  
  
                                                    Last Documented On   
1 11:59AM ; [facility]  
  
  
  
                                                    Assessment of visit for: bhargavi myles for   
human immunodeficiency virus            Medical New Patient with Doreen Cabrera CNP                              2021  
  
  
  
                                                    Last Documented On   
1 4:06PM ; [facility]  
  
  
  
                                                    Diabetes Risk Test Score was  
 one score   
2021                               Medical New Patient with Doreen Cabrera CNP                              2021  
  
  
  
                                                    Last Documented On   
1 4:06PM ; [facility]  
  
  
  
                                Hypertension    Medical New Patient with Doreen DAVID esposito CNP 2021  
  
  
  
                                                    Last Documented On   
1 4:06PM ; [facility]  
  
  
  
                                Morbid obesity  Medical New Patient with Doreen C  
cate CNP 2021  
  
  
  
                                                    Last Documented On   
1 4:06PM ; [facility]  
  
  
  
                                Post-traumatic stress disorder Medical New Patie  
nt with Doreenpaola Cabrera CNP   
2021  
  
  
  
                                                    Last Documented On   
1 4:06PM ; [facility]  
  
  
  
                                                    Z68.42 - Body mass index [BM  
I]   
45.0-49.9, adult                        Medical New Patient with Doreenpaola Cabrera CNP                              2021  
  
  
  
                                                    Last Documented On   
1 4:06PM ; [facility]  
Work Phone: [phone]- Evaluation note  
  
Includes: Assessments for all patient encounters  
  
  
  
                                Findings        Encounter       Date  
   
                                                    [D50.9 - Iron deficiency ane  
jennifer,   
unspecified] iron deficiency anemia Chart Update with Doreen Cabrera CNP   
10/07/2024  
  
  
  
                                                    Last Documented On 10/09/202  
4 2:06PM ; [facility]  
  
  
  
                                        Attention-deficit hyperactivity disorder  
 BH Established Patient with Radha Young LSW                               2024  
  
  
  
                                                    Last Documented On   
4 4:15PM ; [facility]  
  
  
  
                                                    Bipolar I disorder, most rec  
ent   
episode, depressed - mild               BH Established Patient with Radha   
Young LSW                               2024  
  
  
  
                                                    Last Documented On   
4 4:15PM ; [facility]  
  
  
  
                                Nicotine dependence  Established Patient with   
Radhaleslie Young LSW 2024  
  
  
  
                                                    Last Documented On   
4 4:15PM ; [facility]  
  
  
  
                                        Post-traumatic stress disorder  Establ  
ished Patient with Radha Young   
LSW                                     2024  
  
  
  
                                                    Last Documented On   
4 4:15PM ; [facility]  
  
  
  
                                                    [Z68.43 - Body mass index [B  
MI]   
50.0-59.9, adult] assessment of body   
mass index                              Medical Established Patient with   
Doreen Cabrera CNP                        2024  
  
  
  
                                                    Last Documented On 10/04/202  
4 1:56PM ; [facility]  
  
  
  
                                                    Bipolar I disorder, most rec  
ent   
episode, depressed - mild               Medical Established Patient with Doreen Cabrera CNP                              2024  
  
  
  
                                                    Last Documented On 10/04/202  
4 1:56PM ; [facility]  
  
  
  
                                Caries          Medical Established Patient with  
 Doreen Deborah CNP 2024  
  
  
  
                                                    Last Documented On 10/04/202  
4 1:56PM ; [facility]  
  
  
  
                                        Encounter for Immunization Medical Estab  
lished Patient with Doreen Cabrera   
CNP                                     2024  
  
  
  
                                                    Last Documented On 10/04/202  
4 1:56PM ; [facility]  
  
  
  
                                                    Type 2 diabetes mellitus wit  
hout   
complication                            Medical Established Patient with   
Doreen Mccormacken CNP                        2024  
  
  
  
                                                    Last Documented On 10/04/202  
4 1:56PM ; [facility]  
  
  
  
                                        Attention-deficit hyperactivity disorder  
  Established Patient with   
Leonie Short LISWS                     2024  
  
  
  
                                                    Last Documented On   
4 3:28PM ; [facility]  
  
  
  
                                                    Bipolar I disorder, most rec  
ent   
episode, depressed - mild               BH Established Patient with Leonie   
Short LISWS                             2024  
  
  
  
                                                    Last Documented On   
4 3:28PM ; [facility]  
  
  
  
                                        Post-traumatic stress disorder  Establ  
ished Patient with Leonie Short   
LISWS                                   2024  
  
  
  
                                                    Last Documented On   
4 3:28PM ; [facility]  
  
  
  
                                                    [E11.9 - Type 2 diabetes lobito  
litus   
without complications] type 2 diabetes   
mellitus                                Medical Established Patient with   
Doreen Cabrera CNP                        2024  
  
  
  
                                                    Last Documented On   
4 7:36PM ; [facility]  
  
  
  
                                                    [F41.1 - Generalized anxiety  
   
disorder] generalized anxiety   
disorder                                Medical Established Patient with   
Doreen Cabrera CNP                        2024  
  
  
  
                                                    Last Documented On   
4 7:36PM ; [facility]  
  
  
  
                                                    [I10 - Essential (primary)   
hypertension] essential hypertension    Medical Established Patient with   
Doreen Cabrera CNP                        2024  
  
  
  
                                                    Last Documented On   
4 7:36PM ; [facility]  
  
  
  
                                                    [N94.6 - Dysmenorrhea, unspe  
cified]   
dysmenorrhea                            Medical Established Patient with   
Doreen Cabrera CNP                        2024  
  
  
  
                                                    Last Documented On   
4 7:36PM ; [facility]  
  
  
  
                                                    [Z68.43 - Body mass index [B  
MI]   
50.0-59.9, adult] assessment of body   
mass index                              Medical Established Patient with   
Doreen Cabrera CNP                        2024  
  
  
  
                                                    Last Documented On   
4 7:36PM ; [facility]  
  
  
  
                                        Encounter for Immunization Medical Estab  
lished Patient with Doreen Cabrera   
CNP                                     2024  
  
  
  
                                                    Last Documented On   
4 7:36PM ; [facility]  
  
  
  
                                        Venipuncture was performed Medical Estab  
lished Patient with Doreen Cabrera   
CNP                                     2024  
  
  
  
                                                    Last Documented On   
4 7:36PM ; [facility]  
  
  
  
                                        Attention-deficit hyperactivity disorder  
  Established Patient with   
Leonie Short LISWS                     2024  
  
  
  
                                                    Last Documented On   
4 10:10AM ; [facility]  
  
  
  
                                                    Bipolar I disorder, most rec  
ent   
episode, depressed - mild                Established Patient with Leonie   
Short LISWS                             2024  
  
  
  
                                                    Last Documented On   
4 10:10AM ; [facility]  
  
  
  
                                        Post-traumatic stress disorder  Establ  
ished Patient with Leonie Short   
LISWS                                   2024  
  
  
  
                                                    Last Documented On   
4 10:10AM ; [facility]  
  
  
  
                                        [D64.9 - Anemia, unspecified] anemia Med  
ical Established Patient with   
Doreen Cabrera CNP                        2024  
  
  
  
                                                    Last Documented On   
4 6:51PM ; [facility]  
  
  
  
                                                    [Z68.43 - Body mass index [B  
MI]   
50.0-59.9, adult] assessment of body   
mass index                              Medical Established Patient with   
Doreen Cabrera CNP                        2024  
  
  
  
                                                    Last Documented On   
4 6:51PM ; [facility]  
  
  
  
                                                    Bipolar affective disorder,   
current   
episode depressed, mild                  Established Patient with Leonie   
Short LISWS                             2024  
  
  
  
                                                    Last Documented On   
4 5:16PM ; [facility]  
  
  
  
                                        Post-traumatic stress disorder  Establ  
ished Patient with Leonie Short   
LISWS                                   2024  
  
  
  
                                                    Last Documented On   
4 5:16PM ; [facility]  
  
  
  
                                                    Undifferentiated attention d  
eficit   
disorder                                 Established Patient with   
Leonie Short LISWS                     2024  
  
  
  
                                                    Last Documented On   
4 5:16PM ; [facility]  
  
  
  
                                        Visit for: screening for disorder BH Est  
ablished Patient with Leonie Garrett LISWS                             2024  
  
  
  
                                                    Last Documented On   
4 5:16PM ; [facility]  
  
  
  
                                                    [Z68.43 - Body mass index [B  
MI]   
50.0-59.9, adult] assessment of body   
mass index                              Medical Established Patient with   
Doreen Cabrera CNP                        2024  
  
  
  
                                                    Last Documented On   
4 7:50PM ; [facility]  
  
  
  
                                        Attention-deficit hyperactivity disorder  
 Medical Established Patient with   
Doreen Cabrera CNP                        2024  
  
  
  
                                                    Last Documented On   
4 7:50PM ; [facility]  
  
  
  
                                                    Diabetes Risk Test Score was  
 three   
score 3/27/2024                         Medical Established Patient with Doreen Cabrera CNP                              2024  
  
  
  
                                                    Last Documented On   
4 7:50PM ; [facility]  
  
  
  
                                        Visit for: screening for STD Medical Est  
ablished Patient with Doreen Cabrera   
CNP                                     2024  
  
  
  
                                                    Last Documented On   
4 7:50PM ; [facility]  
  
  
  
                                                    [Z68.32 - Body mass index [B  
MI]   
32.0-32.9, adult] assessment of body   
mass index                              Medical Established Patient with   
Doreen Cabrera CNP                        2023  
  
  
  
                                                    Last Documented On   
3 6:01PM ; [facility]  
  
  
  
                                        Borderline personality disorder Medical   
Established Patient with Doreenpaola Cabrera CNP                              2023  
  
  
  
                                                    Last Documented On   
3 6:01PM ; [facility]  
  
  
  
                                        Screening for diabetes mellitus Medical   
Established Patient with Doreenpaola Cabrera CNP                              2023  
  
  
  
                                                    Last Documented On   
3 6:01PM ; [facility]  
  
  
  
                                        Assessment of body mass index Medical Es  
tablished Patient with Doreenpaola Cabrera CNP                              10/21/2022  
  
  
  
                                                    Last Documented On 10/21/202  
2 2:45PM ; [facility]  
  
  
  
                                                    Bipolar affective disorder,   
current   
episode manic                            Telebehavior[facility] with Haylie   
Aguilar LPCC-S                        2022  
  
  
  
                                                    Last Documented On   
2 3:22PM ; [facility]  
  
  
  
                                                    Bipolar affective disorder,   
current   
episode manic                            Established Patient with Haylie   
Aguilar LPCC-S                        2022  
  
  
  
                                                    Last Documented On   
2 2:28PM ; [facility]  
  
  
  
                                                    Bipolar affective disorder,   
current   
episode depressed, severe with   
psychosis                                Telebehavioral Health with Haylie   
Aguilar LPCC-S                        2022  
  
  
  
                                                    Last Documented On   
2 3:29PM ; [facility]  
  
  
  
                                                    Assessment of body mass inde  
x [Body   
mass index [BMI] 50.0-59.9, adult]      Open Access - Established with Julieth   
Aliya CNP                               2022  
  
  
  
                                                    Last Documented On   
2 9:36AM ; [facility]  
  
  
  
                                                    Bipolar I disorder, most rec  
ent   
episode, manic                          Open Access - Established with Julieth   
Aliya CNP                               2022  
  
  
  
                                                    Last Documented On   
2 9:36AM ; [facility]  
  
  
  
                                        Post-traumatic stress disorder BH Establ  
ished Patient with Haylie Aguilar   
LPCC-S                                  2022  
  
  
  
                                                    Last Documented On   
2 3:20PM ; [facility]  
  
  
  
                                No cough        Medical Established Patient with  
 Doreenpaola Cabrera CNP 2022  
  
  
  
                                                    Last Documented On   
2 4:04PM ; [facility]  
  
  
  
                                                    Z68.43 - Body mass index [BM  
I]   
50.0-59.9, adult                        Medical Established Patient with   
Doreen Deborah CNP                        2022  
  
  
  
                                                    Last Documented On   
2 4:04PM ; [facility]  
  
  
  
                                                    Borderline personality disor  
danny Pt   
reported hx of sx/dx                     Established Patient with Haylie Aguilar LPCC-S                        2022  
  
  
  
                                                    Last Documented On   
2 4:08PM ; [facility]  
  
  
  
                                                    Assessment of body mass inde  
x [Body   
mass index [BMI] 50.0-59.9, adult]      Open Access - Established with Julieth   
lAiya CNP                               2022  
  
  
  
                                                    Last Documented On   
2 7:41PM ; [facility]  
  
  
  
                                                    Diabetes Risk Test Score was  
 three   
score 2022                         Open Access - Established with Julieth   
Aliya CNP                               2022  
  
  
  
                                                    Last Documented On   
2 7:41PM ; [facility]  
  
  
  
                                                    Bipolar I disorder, most rec  
ent   
episode, manic                           [facility]  
  
  
  
                                                    Last Documented On   
1 1:35AM ; [facility]  
  
  
  
                                        Borderline personality disorder  Estab  
lished Patient [facility]   
[facility]                          [facility]  
  
  
  
                                                    Last Documented On   
1 1:35AM ; [facility]  
  
  
  
                                        Post-traumatic stress disorder  Establ  
ished Patient [facility]   
[facility]                          [facility]  
  
  
  
                                                    Last Documented On   
1 1:35AM ; [facility]  
  
  
  
                                                    Assessment of visit for: scr  
eening for   
human immunodeficiency virus            Medical Established Patient with   
Doreen Deborah CNP                        2021  
  
  
  
                                                    Last Documented On   
1 2:56PM ; [facility]  
  
  
  
                                Nicotine dependence Medical Established Patient   
with Doreen Deborah CNP 2021  
  
  
  
                                                    Last Documented On   
1 2:56PM ; [facility]  
  
  
  
                                Tachycardia     Medical Established Patient with  
 Doreen Deborah CNP 2021  
  
  
  
                                                    Last Documented On   
1 2:56PM ; [facility]  
  
  
  
                                                    Z68.43 - Body mass index [BM  
I]   
50.0-59.9, adult                        Medical Established Patient with   
Doreen Deborah [facility]                        2021  
  
  
  
                                                    Last Documented On   
1 2:56PM ; [facility]  
  
  
  
                                                    Bipolar I disorder, most rec  
ent   
episode, manic                           Established Patient with Leonie   
Short [facility]                             2021  
  
  
  
                                                    Last Documented On   
1 10:17AM ; [facility]  
  
  
  
                                                    Borderline personality disor  
danny per   
patient reported history                 Established Patient with Leonie   
Short [facility]                             2021  
  
  
  
                                                    Last Documented On   
1 10:17AM ; [facility]  
  
  
  
                                Nicotine dependence BH Established Patient with   
Leonie Short LISWS 2021  
  
  
  
                                                    Last Documented On   
1 10:17AM ; [facility]  
  
  
  
                                        Post-traumatic stress disorder  Establ  
ished Patient with Leonie Short   
LISWS                                   2021  
  
  
  
                                                    Last Documented On   
1 10:17AM ; [facility]  
  
  
  
                                Body mass index Medical Established Patient with  
 Doreen Deborah CNP 2021  
  
  
  
                                                    Last Documented On   
1 5:23PM ; [facility]  
  
  
  
                                Morbid obesity  Medical Established Patient with  
 Doreen Deborah CNP 2021  
  
  
  
                                                    Last Documented On   
1 5:23PM ; [facility]  
  
  
  
                                        Nicotine dependence uncomplicated Medica  
l Established Patient with Doreen   
Deborah CNP                              2021  
  
  
  
                                                    Last Documented On   
1 5:23PM ; [facility]  
  
  
  
                                                    Z68.42 - Body mass index [BM  
I]   
45.0-49.9, adult                        Medical Established Patient with   
Doreen Deborah CNP                        2021  
  
  
  
                                                    Last Documented On   
1 5:23PM ; [facility]  
  
  
  
                                Episodic mood disorders On Call with Pamela edwards CNP 2021  
  
  
  
                                                    Last Documented On   
1 7:49AM ; [facility]  
  
  
  
                                                    Mood disorders, NOS per tabatha  
ent   
reported history                         Established Patient with Leonie   
Short LISWS                             2021  
  
  
  
                                                    Last Documented On   
1 7:15PM ; [facility]  
  
  
  
                                        Post-traumatic stress disorder  Establ  
ished Patient with Leonie Short   
LISWS                                   2021  
  
  
  
                                                    Last Documented On   
1 7:15PM ; [facility]  
  
  
  
                                Morbid obesity  Medical Established Patient with  
 Doreen Deborah CNP 2021  
  
  
  
                                                    Last Documented On   
1 12:31PM ; [facility]  
  
  
  
                                Otitis externa  Medical Established Patient with  
 Doreen Deborah CNP 2021  
  
  
  
                                                    Last Documented On   
1 12:31PM ; [facility]  
  
  
  
                                                    Z68.42 - Body mass index [BM  
I]   
45.0-49.9, adult                        Medical Established Patient with   
Doreen Deborah CNP                        2021  
  
  
  
                                                    Last Documented On   
1 12:31PM ; [facility]  
  
  
  
                                        Post-traumatic stress disorder  Establ  
ished Patient with Leonie Short   
LISWS                                   06/10/2021  
  
  
  
                                                    Last Documented On 06/10/202  
1 10:05PM ; [facility]  
  
  
  
                                Morbid obesity  Medical Established Patient with  
 Doreen Deborah CNP 06/10/2021  
  
  
  
                                                    Last Documented On 06/10/202  
1 4:45PM ; [facility]  
  
  
  
                                                    Z68.42 - Body mass index [BM  
I]   
45.0-49.9, adult                        Medical Established Patient with   
Doreen Deborah CNP                        06/10/2021  
  
  
  
                                                    Last Documented On 06/10/202  
1 4:45PM ; [facility]  
  
  
  
                                        Post-traumatic stress disorder  Establ  
ished Patient with Leonie Short   
LISWS                                   2021  
  
  
  
                                                    Last Documented On   
1 11:59AM ; [facility]  
  
  
  
                                                    Assessment of visit for: scr  
eening for   
human immunodeficiency virus            Medical New Patient with Doreen   
Deborah CNP                              2021  
  
  
  
                                                    Last Documented On   
1 4:06PM ; [facility]  
  
  
  
                                                    Diabetes Risk Test Score was  
 one score   
2021                               Medical New Patient with Doreen   
Deborah CNP                              2021  
  
  
  
                                                    Last Documented On   
1 4:06PM ; [facility]  
  
  
  
                                Hypertension    Medical New Patient with Doreen C  
cate CNP 2021  
  
  
  
                                                    Last Documented On   
1 4:06PM ; [facility]  
  
  
  
                                Morbid obesity  Medical New Patient with Doreen C  
cate CNP 2021  
  
  
  
                                                    Last Documented On   
1 4:06PM ; [facility]  
  
  
  
                                Post-traumatic stress disorder Medical New Patie  
nt with Doreen Deborah CNP   
2021  
  
  
  
                                                    Last Documented On   
1 4:06PM ; [facility]  
  
  
  
                                                    Z68.42 - Body mass index [BM  
I]   
45.0-49.9, adult                        Medical New Patient with Doreen   
Deborah CNP                              2021  
  
  
  
                                                    Last Documented On   
1 4:06PM ; [facility]  
Work Phone: [phone]- Progress note*   
  
Progress note  
  
  
  
                                Date            Encounter       Last Documented   
by  
   
                                10/07/2024      Chart Update    Last documented   
on 10/09/2024; 2:06 PM, Doreen Cabrera CNP;   
[facility]  
  
  
  
                                                      
  
  
** Active Problems & Conditions **  
- F90.9 - Attention-deficit Hyperactivity Disorder  
- F31.31 - Bipolar I Disorder, Most Recent Episode, Depressed Mild  
- E11.9 - Diabetes Mellitus Type 2 Without Complication  
- N94.6 - Dysmenorrhea  
- F43.10 - Post-traumatic Stress Disorder  
  
** Current Medication **  
- Alcohol Pads 70% use to cleanse skin prior to checking blood sugars, 90 days, 
3   
refills  
- ARIPiprazole 5 MG Oral Tablet take 1 tablet by mouth once daily, 30 days, 5 
refills  
- Atorvastatin Calcium 20 MG Oral Tablet take 1 tablet by mouth once daily at   
bedtime, 30 days, 5 refills  
- Blood Glucose System Sriram Kit use to check sugars once daily (use what is 
covered),   
30 days, 0 refills  
- busPIRone HCl 10 MG Oral Tablet take 1 tablet by mouth twice daily (d/c 5 mg 
rx),   
30 days, 5 refills  
- CareSens Lancets Miscellaneous use to check sugars once daily (dispense what 
is   
covered), 90 days, 3 refills  
- Colace 100 MG Oral Capsule take 1 capsule by mouth once daily at bedtime as 
needed   
for constipation, 30 days, 5 refills  
- [employer] Iron 325 (65 Fe) MG Oral Tablet take 1 tablet by mouth once daily, 30 
days, 5   
refills  
- Intuniv 1 MG Oral Tablet Extended Release 24 Hour take 1 tablet by mouth once 
daily   
at bedtime, 30 days, 5 refills  
- Januvia 50 MG Oral Tablet take 1 tablet by mouth once daily, 30 days, 5 
refills  
- lamoTRIgine 100 MG Oral Tablet take 1 tablet by mouth once daily, 30 days, 5   
refills  
- Lisinopril 5 MG Oral Tablet take 1 tablet by mouth once daily, 30 days, 5 
refills  
- MiraLax 17 GM/SCOOP Oral Powder mix 1 scoop with 8 ounces once daily, 30 days,
 2   
refills  
- Narcan 4 MG/0.1ML Nasal Liquid spray in nostril for symptoms of overdose , may
   
repeat in 2 minutes if symptoms persist, 1 days, 0 refills  
- OneTouch Ultra In Vitro Strip USE ONE TEST STRIP TO CHECK BLOOD SUGARS ONCE 
DAILY,   
90 days, 3 refills  
- Prazosin HCl 1 MG Oral Capsule take 1 capsule by mouth twice daily, 30 days, 5
   
refills  
- SEROquel 50 MG Oral Tablet take 1 tablet by mouth once daily at bedtime (d/c   
trazodone), 30 days, 1 refills  
- Sertraline HCl 50 MG Oral Tablet take 1 tablet by mouth once daily, 30 days, 5
   
refills  
- Slynd 4 MG Oral Tablet take 1 tablet by mouth at the same time everyday to 
help   
regulate your period, 28 days, 2 refills  
  
** Past Medical/Surgical History **  
Reported:  
No Safety Measures. Has sex without a condom.  
Medical: No previous hospitalizations. Chronic illness and Sexually transmitted   
infection Partners sexually transmitted infection status known.  
Immunization History: Recent immunization for flu.  
Exposure: Exposure to COVID-19.  
Pregnancy: Previously pregnant 1 time(s) and para having 0 live birth(s). Not   
planning a pregnancy in the next year.  
Legal Documents: Consent form on file for procedure.  
Diagnoses:  
Polycystic Ovarian Syndrome (PCOS).  
Migraine headache.  
Psychiatric disorders biopolar disorder  
Anxiety disorder  
POTS.  
Procedural:  
- Insertion of ear pressure equalization tubes in both ears  
Surgical:  
- Tonsillectomy  
- Tonsillectomy with adenoidectomy  
  
** Allergies **  
- Abilify Reaction: groggy  
- Augmentin Reaction: Shock  
- Metformin Reaction: Nausea, Vomiting  
- NO KNOWN ENVIRONMENTAL ALLERGIES  
- NO KNOWN FOOD ALLERGIES  
- Sertraline Reaction: slow/groggy  
- Trazodone Hydrochloride Reaction: Panic attack  
  
** Family History **  
Paternal:  
Systemic hypertension  
Oncologic disorder  
Maternal:  
Systemic hypertension  
Epilepsy and recurrent seizures  
Psychiatric disorders  
Oncologic disorder  
Fraternal:  
Psychiatric disorders  
  
** Assessment **  
- D50.9 - Iron deficiency anemia, unspecified  
  
** Plan **  
StartCited- Iron deficiency anemia, unspecified  
Lab: Iron and TIBC  
Lab: CBC With Differential/Platelet  
EndCited  
** Care Team **  
- Doreen Cabrera CNP  
  
  
[facility]- Progress note*   
  
Progress note  
  
  
  
                                Date            Encounter       Last Documented   
by  
   
                                10/01/2024      Chart Update    Last documented   
on 10/07/2024; 12:06 PM, Doreen Cabrera CNP;   
[facility] [place]  
  
  
  
                                                      
  
  
** Active Problems & Conditions **  
- F90.9 - Attention-deficit Hyperactivity Disorder  
- F31.31 - Bipolar I Disorder, Most Recent Episode, Depressed Mild  
- E11.9 - Diabetes Mellitus Type 2 Without Complication  
- N94.6 - Dysmenorrhea  
- F43.10 - Post-traumatic Stress Disorder  
  
** Current Medication **  
- Alcohol Pads 70% use to cleanse skin prior to checking blood sugars, 90 days, 
3   
refills  
- ARIPiprazole 5 MG Oral Tablet take 1 tablet by mouth once daily, 30 days, 5 
refills  
- Atorvastatin Calcium 20 MG Oral Tablet take 1 tablet by mouth once daily at   
bedtime, 30 days, 5 refills  
- Blood Glucose System Sriram Kit use to check sugars once daily (use what is 
covered),   
30 days, 0 refills  
- busPIRone HCl 10 MG Oral Tablet take 1 tablet by mouth twice daily (d/c 5 mg 
rx),   
30 days, 5 refills  
- CareSens Lancets Miscellaneous use to check sugars once daily (dispense what 
is   
covered), 90 days, 3 refills  
- Colace 100 MG Oral Capsule take 1 capsule by mouth once daily at bedtime as 
needed   
for constipation, 30 days, 5 refills  
- [employer] Iron 325 (65 Fe) MG Oral Tablet take 1 tablet by mouth once daily, 30 
days, 5   
refills  
- Intuniv 1 MG Oral Tablet Extended Release 24 Hour take 1 tablet by mouth once 
daily   
at bedtime, 30 days, 5 refills  
- Januvia 50 MG Oral Tablet take 1 tablet by mouth once daily, 30 days, 5 
refills  
- lamoTRIgine 100 MG Oral Tablet take 1 tablet by mouth once daily, 30 days, 5   
refills  
- Lisinopril 5 MG Oral Tablet take 1 tablet by mouth once daily, 30 days, 5 
refills  
- MiraLax 17 GM/SCOOP Oral Powder mix 1 scoop with 8 ounces once daily, 30 days,
 2   
refills  
- Narcan 4 MG/0.1ML Nasal Liquid spray in nostril for symptoms of overdose , may
   
repeat in 2 minutes if symptoms persist, 1 days, 0 refills  
- OneTouch Ultra In Vitro Strip USE ONE TEST STRIP TO CHECK BLOOD SUGARS ONCE 
DAILY,   
90 days, 3 refills  
- Prazosin HCl 1 MG Oral Capsule take 1 capsule by mouth twice daily, 30 days, 5
   
refills  
- SEROquel 50 MG Oral Tablet take 1 tablet by mouth once daily at bedtime (d/c   
trazodone), 30 days, 1 refills  
- Sertraline HCl 50 MG Oral Tablet take 1 tablet by mouth once daily, 30 days, 5
   
refills  
- Slynd 4 MG Oral Tablet take 1 tablet by mouth at the same time everyday to 
help   
regulate your period, 28 days, 2 refills  
  
** Past Medical/Surgical History **  
Reported:  
No Safety Measures. Has sex without a condom.  
Medical: No previous hospitalizations. Chronic illness and Sexually transmitted   
infection Partners sexually transmitted infection status known.  
Immunization History: Recent immunization for flu.  
Exposure: Exposure to COVID-19.  
Pregnancy: Previously pregnant 1 time(s) and para having 0 live birth(s). Not   
planning a pregnancy in the next year.  
Legal Documents: Consent form on file for procedure.  
Diagnoses:  
Polycystic Ovarian Syndrome (PCOS).  
Migraine headache.  
Psychiatric disorders biopolar disorder  
Anxiety disorder  
POTS.  
Procedural:  
- Insertion of ear pressure equalization tubes in both ears  
Surgical:  
- Tonsillectomy  
- Tonsillectomy with adenoidectomy  
  
** Allergies **  
- Abilify Reaction: groggy  
- Augmentin Reaction: Shock  
- Metformin Reaction: Nausea, Vomiting  
- NO KNOWN ENVIRONMENTAL ALLERGIES  
- NO KNOWN FOOD ALLERGIES  
- Sertraline Reaction: slow/groggy  
- Trazodone Hydrochloride Reaction: Panic attack  
  
** Family History **  
Paternal:  
Systemic hypertension  
Oncologic disorder  
Maternal:  
Systemic hypertension  
Epilepsy and recurrent seizures  
Psychiatric disorders  
Oncologic disorder  
Fraternal:  
Psychiatric disorders  
  
** Care Team **  
- Doreen Cabrera CNP  
  
  
[facility]- History general Narrative - Reported  
  
Includes: Medical History in patient's chart  
  
  
  
                                        Description         Last Updated  
   
                                        No Safety Measures  2024  
  
  
  
                                                    Last Documented On   
4 4:15PM ; [facility]  
  
  
  
                                        POTS                2024  
  
  
  
                                                    Last Documented On   
4 6:51PM ; [facility]  
  
  
  
                                        0 previous live birth(s) 2024  
  
  
  
                                                    Last Documented On   
4 7:50PM ; [facility]  
  
  
  
                                        Previously pregnant 1 time(s) 2024  
  
  
  
                                                    Last Documented On   
4 7:50PM ; [facility]  
  
  
  
                                        Recent immunization for flu 2024  
  
  
  
                                                    Last Documented On   
4 7:50PM ; [facility]  
  
  
  
                                        Has sex without a condom 2022  
  
  
  
                                                    Last Documented On   
2 4:04PM ; [facility]  
  
  
  
                                        Not planning to have a baby in the next   
12 months 2022  
  
  
  
                                                    Last Documented On   
2 4:04PM ; [facility]  
  
  
  
                                        Partners sexually transmitted infection   
status known 2022  
  
  
  
                                                    Last Documented On   
2 4:04PM ; [facility]  
  
  
  
                                        No previous hospitalizations 2022  
  
  
  
                                                    Last Documented On   
2 4:04PM ; [facility]  
  
  
  
                                        Chronic illness     2021  
  
  
  
                                                    Last Documented On   
1 7:49AM ; [facility]  
  
  
  
                                        Exposure to COVID-19 2021  
  
  
  
                                                    Last Documented On   
1 12:31PM ; [facility]  
  
  
  
                                        History of gynecologic disorder 20  
21  
  
  
  
                                                    Last Documented On   
1 4:06PM ; [facility]  
  
  
  
                                        History of Polycystic Ovarian Syndrome (  
PCOS) 2021  
  
  
  
                                                    Last Documented On   
1 4:06PM ; [facility]  
  
  
  
                                        History of anxiety disorder NOS 20  
21  
  
  
  
                                                    Last Documented On   
1 4:06PM ; [facility]  
  
  
  
                                        History of migraine headache 2021  
  
  
  
                                                    Last Documented On   
1 4:06PM ; [facility]  
  
  
  
                                        History of psychiatric disorders biopola  
r disorder 2021  
  
  
  
                                                    Last Documented On   
1 4:06PM ; [facility]  
Work Phone: [phone]- History general Narrative - Reported  
  
Includes: Medical History in patient's chart  
  
  
  
                                        Description         Last Updated  
   
                                        No Safety Measures  2024  
  
  
  
                                                    Last Documented On   
4 4:15PM ; [facility]  
  
  
  
                                        Consent form on file for procedure   
  
  
  
                                                    Last Documented On 10/04/202  
4 1:56PM ; [facility]  
  
  
  
                                        POTS                2024  
  
  
  
                                                    Last Documented On   
4 6:51PM ; [facility]  
  
  
  
                                        0 previous live birth(s) 2024  
  
  
  
                                                    Last Documented On   
4 7:50PM ; [facility]  
  
  
  
                                        Previously pregnant 1 time(s) 2024  
  
  
  
                                                    Last Documented On   
4 7:50PM ; [facility]  
  
  
  
                                        Recent immunization for flu 2024  
  
  
  
                                                    Last Documented On   
4 7:50PM ; [facility]  
  
  
  
                                        Has sex without a condom 2022  
  
  
  
                                                    Last Documented On   
2 4:04PM ; [facility]  
  
  
  
                                        Not planning to have a baby in the next   
12 months 2022  
  
  
  
                                                    Last Documented On   
2 4:04PM ; [facility]  
  
  
  
                                        Partners sexually transmitted infection   
status known 2022  
  
  
  
                                                    Last Documented On   
2 4:04PM ; [facility]  
  
  
  
                                        No previous hospitalizations 2022  
  
  
  
                                                    Last Documented On   
2 4:04PM ; [facility]  
  
  
  
                                        Chronic illness     2021  
  
  
  
                                                    Last Documented On   
1 7:49AM ; [facility]  
  
  
  
                                        Exposure to COVID-19 2021  
  
  
  
                                                    Last Documented On   
1 12:31PM ; [facility]  
  
  
  
                                        History of gynecologic disorder 20  
21  
  
  
  
                                                    Last Documented On   
1 4:06PM ; [facility]  
  
  
  
                                        History of Polycystic Ovarian Syndrome (  
PCOS) 2021  
  
  
  
                                                    Last Documented On   
1 4:06PM ; [facility]  
  
  
  
                                        History of anxiety disorder NOS 20  
21  
  
  
  
                                                    Last Documented On   
1 4:06PM ; [facility]  
  
  
  
                                        History of migraine headache 2021  
  
  
  
                                                    Last Documented On   
1 4:06PM ; [facility]  
  
  
  
                                        History of psychiatric disorders biopola  
r disorder 2021  
  
  
  
                                                    Last Documented On   
1 4:06PM ; [facility]  
Work Phone: [phone]- History general Narrative - Reported  
  
Includes: Medical History in patient's chart  
  
  
  
                                        Description         Last Updated  
   
                                        No Safety Measures  2024  
  
  
  
                                                    Last Documented On   
4 4:15PM ; [facility]  
  
  
  
                                        Consent form on file for procedure   
  
  
  
                                                    Last Documented On 10/04/202  
4 1:56PM ; [facility]  
  
  
  
                                        POTS                2024  
  
  
  
                                                    Last Documented On   
4 6:51PM ; [facility]  
  
  
  
                                        0 previous live birth(s) 2024  
  
  
  
                                                    Last Documented On   
4 7:50PM ; [facility]  
  
  
  
                                        Previously pregnant 1 time(s) 2024  
  
  
  
                                                    Last Documented On   
4 7:50PM ; [facility]  
  
  
  
                                        Recent immunization for flu 2024  
  
  
  
                                                    Last Documented On   
4 7:50PM ; [facility]  
  
  
  
                                        Has sex without a condom 2022  
  
  
  
                                                    Last Documented On   
2 4:04PM ; [facility]  
  
  
  
                                        Not planning to have a baby in the next   
12 months 2022  
  
  
  
                                                    Last Documented On   
2 4:04PM ; [facility]  
  
  
  
                                        Partners sexually transmitted infection   
status known 2022  
  
  
  
                                                    Last Documented On   
2 4:04PM ; [facility]  
  
  
  
                                        No previous hospitalizations 2022  
  
  
  
                                                    Last Documented On   
2 4:04PM ; [facility]  
  
  
  
                                        Chronic illness     2021  
  
  
  
                                                    Last Documented On   
1 7:49AM ; [facility]  
  
  
  
                                        Exposure to COVID-19 2021  
  
  
  
                                                    Last Documented On   
1 12:31PM ; [facility]  
  
  
  
                                        History of gynecologic disorder 20  
21  
  
  
  
                                                    Last Documented On   
1 4:06PM ; [facility]  
  
  
  
                                        History of Polycystic Ovarian Syndrome (  
PCOS) 2021  
  
  
  
                                                    Last Documented On   
1 4:06PM ; [facility]  
  
  
  
                                        History of anxiety disorder NOS 20  
21  
  
  
  
                                                    Last Documented On   
1 4:06PM ; [facility]  
  
  
  
                                        History of migraine headache 2021  
  
  
  
                                                    Last Documented On   
1 4:06PM ; [facility]  
  
  
  
                                        History of psychiatric disorders biopola  
r disorder 2021  
  
  
  
                                                    Last Documented On   
1 4:06PM ; [facility]  
Work Phone: [phone]- History general Narrative - Reported  
  
Includes: Medical History in patient's chart  
  
  
  
                                        Description         Last Updated  
   
                                        No Safety Measures  2024  
  
  
  
                                                    Last Documented On   
4 4:15PM ; [facility]  
  
  
  
                                        Consent form on file for procedure   
  
  
  
                                                    Last Documented On 10/09/202  
4 2:09PM ; [facility]  
  
  
  
                                        POTS                2024  
  
  
  
                                                    Last Documented On   
4 6:51PM ; [facility]  
  
  
  
                                        0 previous live birth(s) 2024  
  
  
  
                                                    Last Documented On   
4 7:50PM ; [facility]  
  
  
  
                                        Previously pregnant 1 time(s) 2024  
  
  
  
                                                    Last Documented On   
4 7:50PM ; [facility]  
  
  
  
                                        Recent immunization for flu 2024  
  
  
  
                                                    Last Documented On   
4 7:50PM ; [facility]  
  
  
  
                                        Has sex without a condom 2022  
  
  
  
                                                    Last Documented On   
2 4:04PM ; [facility]  
  
  
  
                                        Not planning to have a baby in the next   
12 months 2022  
  
  
  
                                                    Last Documented On   
2 4:04PM ; [facility]  
  
  
  
                                        Partners sexually transmitted infection   
status known 2022  
  
  
  
                                                    Last Documented On   
2 4:04PM ; [facility]  
  
  
  
                                        No previous hospitalizations 2022  
  
  
  
                                                    Last Documented On   
2 4:04PM ; [facility]  
  
  
  
                                        Chronic illness     2021  
  
  
  
                                                    Last Documented On   
1 7:49AM ; [facility]  
  
  
  
                                        Exposure to COVID-19 2021  
  
  
  
                                                    Last Documented On   
1 12:31PM ; [facility]  
  
  
  
                                        History of gynecologic disorder 20  
21  
  
  
  
                                                    Last Documented On   
1 4:06PM ; [facility]  
  
  
  
                                        History of Polycystic Ovarian Syndrome (  
PCOS) 2021  
  
  
  
                                                    Last Documented On   
1 4:06PM ; [facility]  
  
  
  
                                        History of anxiety disorder NOS 20  
21  
  
  
  
                                                    Last Documented On   
1 4:06PM ; [facility]  
  
  
  
                                        History of migraine headache 2021  
  
  
  
                                                    Last Documented On   
1 4:06PM ; [facility]  
  
  
  
                                        History of psychiatric disorders biopola  
r disorder 2021  
  
  
  
                                                    Last Documented On   
1 4:06PM ; [facility]  
Work Phone: [phone]- History general Narrative - Reported  
  
Includes: Medical History in patient's chart  
  
  
  
                                        Description         Last Updated  
   
                                        No Safety Measures  2024  
  
  
  
                                                    Last Documented On   
4 4:15PM ; [facility]  
  
  
  
                                        Consent form on file for procedure   
  
  
  
                                                    Last Documented On 10/04/202  
4 1:56PM ; [facility]  
  
  
  
                                        POTS                2024  
  
  
  
                                                    Last Documented On   
4 6:51PM ; [facility]  
  
  
  
                                        0 previous live birth(s) 2024  
  
  
  
                                                    Last Documented On   
4 7:50PM ; [facility]  
  
  
  
                                        Previously pregnant 1 time(s) 2024  
  
  
  
                                                    Last Documented On   
4 7:50PM ; [facility]  
  
  
  
                                        Recent immunization for flu 2024  
  
  
  
                                                    Last Documented On   
4 7:50PM ; [facility]  
  
  
  
                                        Has sex without a condom 2022  
  
  
  
                                                    Last Documented On   
2 4:04PM ; [facility]  
  
  
  
                                        Not planning to have a baby in the next   
12 months 2022  
  
  
  
                                                    Last Documented On   
2 4:04PM ; [facility]  
  
  
  
                                        Partners sexually transmitted infection   
status known 2022  
  
  
  
                                                    Last Documented On   
2 4:04PM ; [facility]  
  
  
  
                                        No previous hospitalizations 2022  
  
  
  
                                                    Last Documented On   
2 4:04PM ; [facility]  
  
  
  
                                        Chronic illness     2021  
  
  
  
                                                    Last Documented On   
1 7:49AM ; [facility]  
  
  
  
                                        Exposure to COVID-19 2021  
  
  
  
                                                    Last Documented On   
1 12:31PM ; [facility]  
  
  
  
                                        History of gynecologic disorder 20  
21  
  
  
  
                                                    Last Documented On   
1 4:06PM ; [facility]  
  
  
  
                                        History of Polycystic Ovarian Syndrome (  
PCOS) 2021  
  
  
  
                                                    Last Documented On   
1 4:06PM ; [facility]  
  
  
  
                                        History of anxiety disorder NOS 20  
21  
  
  
  
                                                    Last Documented On   
1 4:06PM ; [facility]  
  
  
  
                                        History of migraine headache 2021  
  
  
  
                                                    Last Documented On   
1 4:06PM ; [facility]  
  
  
  
                                        History of psychiatric disorders biopola  
r disorder 2021  
  
  
  
                                                    Last Documented On   
1 4:06PM ; [facility]  
Work Phone: [phone]- Evaluation note  
  
Includes: Assessments for all patient encounters  
  
  
  
                                Findings        Encounter       Date  
   
                                                    Attention-deficit hyperactiv  
ity   
disorder                                 Established Patient with Radha Young LSW                               2024  
  
  
  
                                                    Last Documented On   
4 4:15PM ; [facility]  
  
  
  
                                                    Bipolar I disorder, most rec  
ent   
episode, depressed - mild                Established Patient with Radha Young LSW                               2024  
  
  
  
                                                    Last Documented On   
4 4:15PM ; [facility]  
  
  
  
                                Nicotine dependence  Established Patient with   
Radha Young LSW 2024  
  
  
  
                                                    Last Documented On   
4 4:15PM ; [facility]  
  
  
  
                                        Post-traumatic stress disorder  Establ  
ished Patient with Radha Young   
LSW                                     2024  
  
  
  
                                                    Last Documented On   
4 4:15PM ; [facility]  
  
  
  
                                                    [Z68.43 - Body mass index [B  
MI]   
50.0-59.9, adult] assessment of body   
mass index                              Medical Established Patient with   
Doreen Cabrera CNP                        2024  
  
  
  
                                                    Last Documented On   
4 5:46PM ; [facility]  
  
  
  
                                                    Bipolar I disorder, most rec  
ent   
episode, depressed - mild               Medical Established Patient with Doreen   
Deborah CNP                              2024  
  
  
  
                                                    Last Documented On   
4 5:46PM ; [facility]  
  
  
  
                                Caries          Medical Established Patient with  
 Doreen Deborah CNP 2024  
  
  
  
                                                    Last Documented On   
4 5:46PM ; [facility]  
  
  
  
                                        Encounter for Immunization Medical Estab  
lished Patient with Doreen Deborah   
CNP                                     2024  
  
  
  
                                                    Last Documented On   
4 5:46PM ; [facility]  
  
  
  
                                                    Type 2 diabetes mellitus wit  
hout   
complication                            Medical Established Patient with   
Doreen Deborah CNP                        2024  
  
  
  
                                                    Last Documented On   
4 5:46PM ; [facility]  
  
  
  
                                        Attention-deficit hyperactivity disorder  
  Established Patient with   
Leonie Short LISWS                     2024  
  
  
  
                                                    Last Documented On   
4 3:28PM ; [facility]  
  
  
  
                                                    Bipolar I disorder, most rec  
ent   
episode, depressed - mild               BH Established Patient with Leonie   
Short LISWS                             2024  
  
  
  
                                                    Last Documented On   
4 3:28PM ; [facility]  
  
  
  
                                        Post-traumatic stress disorder  Establ  
ished Patient with Leonie Short   
LISWS                                   2024  
  
  
  
                                                    Last Documented On   
4 3:28PM ; [facility]  
  
  
  
                                                    [E11.9 - Type 2 diabetes lobito  
litus   
without complications] type 2 diabetes   
mellitus                                Medical Established Patient with   
Doreen Cabrera CNP                        2024  
  
  
  
                                                    Last Documented On   
4 7:36PM ; [facility]  
  
  
  
                                                    [F41.1 - Generalized anxiety  
   
disorder] generalized anxiety   
disorder                                Medical Established Patient with   
Doreen Cabrera CNP                        2024  
  
  
  
                                                    Last Documented On   
4 7:36PM ; [facility]  
  
  
  
                                                    [I10 - Essential (primary)   
hypertension] essential hypertension    Medical Established Patient with   
Doreen Cabrera CNP                        2024  
  
  
  
                                                    Last Documented On   
4 7:36PM ; [facility]  
  
  
  
                                                    [N94.6 - Dysmenorrhea, unspe  
cified]   
dysmenorrhea                            Medical Established Patient with   
Doreen Cabrera CNP                        2024  
  
  
  
                                                    Last Documented On   
4 7:36PM ; [facility]  
  
  
  
                                                    [Z68.43 - Body mass index [B  
MI]   
50.0-59.9, adult] assessment of body   
mass index                              Medical Established Patient with   
Doreen Cabrera CNP                        2024  
  
  
  
                                                    Last Documented On   
4 7:36PM ; [facility]  
  
  
  
                                        Encounter for Immunization Medical Estab  
lished Patient with Doreen Cabrera   
CNP                                     2024  
  
  
  
                                                    Last Documented On   
4 7:36PM ; [facility]  
  
  
  
                                        Venipuncture was performed Medical Estab  
lished Patient with Doreen Cabrera   
CNP                                     2024  
  
  
  
                                                    Last Documented On   
4 7:36PM ; [facility]  
  
  
  
                                        Attention-deficit hyperactivity disorder  
  Established Patient with   
Leonie Short LISWS                     2024  
  
  
  
                                                    Last Documented On   
4 10:10AM ; [facility]  
  
  
  
                                                    Bipolar I disorder, most rec  
ent   
episode, depressed - mild                Established Patient with Leonie   
Short LISWS                             2024  
  
  
  
                                                    Last Documented On   
4 10:10AM ; [facility]  
  
  
  
                                        Post-traumatic stress disorder  Establ  
ished Patient with Leonie Short   
LISWS                                   2024  
  
  
  
                                                    Last Documented On   
4 10:10AM ; [facility]  
  
  
  
                                        [D64.9 - Anemia, unspecified] anemia Med  
ical Established Patient with   
Doreenpaola Cabrera CNP                        2024  
  
  
  
                                                    Last Documented On   
4 6:51PM ; [facility]  
  
  
  
                                                    [Z68.43 - Body mass index [B  
MI]   
50.0-59.9, adult] assessment of body   
mass index                              Medical Established Patient with   
Doreen Cabrera CNP                        2024  
  
  
  
                                                    Last Documented On   
4 6:51PM ; [facility]  
  
  
  
                                                    Bipolar affective disorder,   
current   
episode depressed, mild                  Established Patient with Leonie   
Short LISWS                             2024  
  
  
  
                                                    Last Documented On   
4 5:16PM ; [facility]  
  
  
  
                                        Post-traumatic stress disorder  Establ  
ished Patient with Leonie Short   
LISWS                                   2024  
  
  
  
                                                    Last Documented On   
4 5:16PM ; [facility]  
  
  
  
                                                    Undifferentiated attention d  
eficit   
disorder                                 Established Patient with   
Leonie Short LISWS                     2024  
  
  
  
                                                    Last Documented On   
4 5:16PM ; [facility]  
  
  
  
                                        Visit for: screening for disorder  Est  
ablished Patient with Leonie   
Short LISWS                             2024  
  
  
  
                                                    Last Documented On   
4 5:16PM ; [facility]  
  
  
  
                                                    [Z68.43 - Body mass index [B  
MI]   
50.0-59.9, adult] assessment of body   
mass index                              Medical Established Patient with   
Doreen Cabrera CNP                        2024  
  
  
  
                                                    Last Documented On   
4 7:50PM ; [facility]  
  
  
  
                                        Attention-deficit hyperactivity disorder  
 Medical Established Patient with   
Doreen Cabrera CNP                        2024  
  
  
  
                                                    Last Documented On   
4 7:50PM ; [facility]  
  
  
  
                                                    Diabetes Risk Test Score was  
 three   
score 3/27/2024                         Medical Established Patient with Doreen Cabrera CNP                              2024  
  
  
  
                                                    Last Documented On   
4 7:50PM ; [facility]  
  
  
  
                                        Visit for: screening for STD Medical Est  
ablished Patient with Doreen Cabrera   
CNP                                     2024  
  
  
  
                                                    Last Documented On   
4 7:50PM ; [facility]  
  
  
  
                                                    [Z68.32 - Body mass index [B  
MI]   
32.0-32.9, adult] assessment of body   
mass index                              Medical Established Patient with   
Doreen Cabrera [facility]                        2023  
  
  
  
                                                    Last Documented On   
3 6:01PM ; [facility]  
  
  
  
                                        Borderline personality disorder Medical   
Established Patient with Doreen Cabrera [facility]                              2023  
  
  
  
                                                    Last Documented On   
3 6:01PM ; [facility]  
  
  
  
                                        Screening for diabetes mellitus Medical   
Established Patient with Doreen Cabrera [facility]                              2023  
  
  
  
                                                    Last Documented On   
3 6:01PM ; [facility]  
  
  
  
                                        Assessment of body mass index Medical Es  
tablished Patient with Doreen Cabrera [facility]                              10/21/2022  
  
  
  
                                                    Last Documented On 10/21/202  
2 2:45PM ; [facility]  
  
  
  
                                                    Bipolar affective disorder,   
current   
episode manic                            [facility] with Haylienicanor Aguilar LP[facility]-S                        2022  
  
  
  
                                                    Last Documented On   
2 3:22PM ; [facility]  
  
  
  
                                                    Bipolar affective disorder,   
current   
episode manic                            Established Patient with Haylienicanor Aguilar LPCC-S                        2022  
  
  
  
                                                    Last Documented On   
2 2:28PM ; [facility]  
  
  
  
                                                    Bipolar affective disorder,   
current   
episode depressed, severe with   
psychosis                                [facility] with Haylienicanor Aguilar LPCC-S                        2022  
  
  
  
                                                    Last Documented On   
2 3:29PM ; [facility]  
  
  
  
                                                    Assessment of body mass inde  
x [Body   
mass index [BMI] 50.0-59.9, adult]      [facility] - Established with Julieth Pisano CNP                               2022  
  
  
  
                                                    Last Documented On   
2 9:36AM ; [facility]  
  
  
  
                                                    Bipolar I disorder, most rec  
ent   
episode, manic                          [facility] - Established with Julieth   
Aliya CNP                               2022  
  
  
  
                                                    Last Documented On   
2 9:36AM ; [facility]  
  
  
  
                                        Post-traumatic stress disorder BH Establ  
ished Patient with Haylienicanor Aguilar   
LP[facility]S                                  2022  
  
  
  
                                                    Last Documented On   
2 3:20PM ; [facility]  
  
  
  
                                No cough        Medical Established Patient with  
 Doreenpaola Cabrera CNP 2022  
  
  
  
                                                    Last Documented On   
2 4:04PM ; [facility]  
  
  
  
                                                    Z68.43 - Body mass index [BM  
I]   
50.0-59.9, adult                        Medical Established Patient with   
Doreenpaola Cabrera CNP                        2022  
  
  
  
                                                    Last Documented On   
2 4:04PM ; [facility]  
  
  
  
                                                    Borderline personality disor  
danny Pt   
reported hx of sx/dx                     Established Patient with Haylie   
Aguilar LP[facility]-S                        2022  
  
  
  
                                                    Last Documented On   
2 4:08PM ; [facility]  
  
  
  
                                                    Assessment of body mass inde  
x [Body   
mass index [BMI] 50.0-59.9, adult]      [facility] - Established with Julieth   
Aliya CNP                               2022  
  
  
  
                                                    Last Documented On   
2 7:41PM ; [facility]  
  
  
  
                                                    Diabetes Risk Test Score was  
 three   
score 2022                         Open Access - Established with Julieth   
Aliya CNP                               2022  
  
  
  
                                                    Last Documented On   
2 7:41PM ; [facility]  
  
  
  
                                                    Bipolar I disorder, most rec  
ent   
episode, manic                           Established Patient with Avis   
Felder LPCC-S                          2021  
  
  
  
                                                    Last Documented On   
1 1:35AM ; [facility]  
  
  
  
                                        Borderline personality disorder BH Estab  
lished Patient with Avis   
Felder LPCC-S                          2021  
  
  
  
                                                    Last Documented On   
1 1:35AM ; [facility]  
  
  
  
                                        Post-traumatic stress disorder BH Establ  
ished Patient with Avis   
Felder LPCC-S                          2021  
  
  
  
                                                    Last Documented On   
1 1:35AM ; [facility]  
  
  
  
                                                    Assessment of visit for: bhargavi myles for   
human immunodeficiency virus            Medical Established Patient with   
Doreen Cabrera CNP                        2021  
  
  
  
                                                    Last Documented On   
1 2:56PM ; [facility]  
  
  
  
                                Nicotine dependence Medical Established Patient   
with Doreen Deborah CNP 2021  
  
  
  
                                                    Last Documented On   
1 2:56PM ; [facility]  
  
  
  
                                Tachycardia     Medical Established Patient with  
 Doreen Deborah CNP 2021  
  
  
  
                                                    Last Documented On   
1 2:56PM ; [facility]  
  
  
  
                                                    Z68.43 - Body mass index [BM  
I]   
50.0-59.9, adult                        Medical Established Patient with   
Doreen Deborah CNP                        2021  
  
  
  
                                                    Last Documented On   
1 2:56PM ; [facility]  
  
  
  
                                                    Bipolar I disorder, most rec  
ent   
episode, manic                           Established Patient with Leonie   
Short LISWS                             2021  
  
  
  
                                                    Last Documented On   
1 10:17AM ; [facility]  
  
  
  
                                                    Borderline personality disor  
danny per   
patient reported history                 Established Patient with Leonie   
Short LISWS                             2021  
  
  
  
                                                    Last Documented On   
1 10:17AM ; [facility]  
  
  
  
                                Nicotine dependence  Established Patient with   
Leonie Short LISWS 2021  
  
  
  
                                                    Last Documented On   
1 10:17AM ; [facility]  
  
  
  
                                        Post-traumatic stress disorder  Establ  
ished Patient with Leonie Short   
LISWS                                   2021  
  
  
  
                                                    Last Documented On   
1 10:17AM ; [facility]  
  
  
  
                                Body mass index Medical Established Patient with  
 Doreen Deborah CNP 2021  
  
  
  
                                                    Last Documented On   
1 5:23PM ; [facility]  
  
  
  
                                Morbid obesity  Medical Established Patient with  
 Doreen Deborah CNP 2021  
  
  
  
                                                    Last Documented On   
1 5:23PM ; [facility]  
  
  
  
                                        Nicotine dependence uncomplicated Medica  
l Established Patient with Doreen   
Deborah CNP                              2021  
  
  
  
                                                    Last Documented On   
1 5:23PM ; [facility]  
  
  
  
                                                    Z68.42 - Body mass index [BM  
I]   
45.0-49.9, adult                        Medical Established Patient with   
Doreen Deborah CNP                        2021  
  
  
  
                                                    Last Documented On   
1 5:23PM ; [facility]  
  
  
  
                                Episodic mood disorders On Call with Pamela edwards CNP 2021  
  
  
  
                                                    Last Documented On   
1 7:49AM ; [facility]  
  
  
  
                                                    Mood disorders, NOS per tabatha  
ent   
reported history                         Established Patient with Leonie   
Short LISWS                             2021  
  
  
  
                                                    Last Documented On   
1 7:15PM ; [facility]  
  
  
  
                                        Post-traumatic stress disorder  Establ  
ished Patient with Leonie Short   
LISWS                                   2021  
  
  
  
                                                    Last Documented On   
1 7:15PM ; [facility]  
  
  
  
                                Morbid obesity  Medical Established Patient with  
 Doreen Deborah CNP 2021  
  
  
  
                                                    Last Documented On   
1 12:31PM ; [facility]  
  
  
  
                                Otitis externa  Medical Established Patient with  
 Doreen Deborah CNP 2021  
  
  
  
                                                    Last Documented On   
1 12:31PM ; [facility]  
  
  
  
                                                    Z68.42 - Body mass index [BM  
I]   
45.0-49.9, adult                        Medical Established Patient with   
Doreen Deborah CNP                        2021  
  
  
  
                                                    Last Documented On   
1 12:31PM ; [facility]  
  
  
  
                                        Post-traumatic stress disorder  Establ  
ished Patient with Leonie Short   
LISWS                                   06/10/2021  
  
  
  
                                                    Last Documented On 06/10/202  
1 10:05PM ; [facility]  
  
  
  
                                Morbid obesity  Medical Established Patient with  
 Doreen Deborah CNP 06/10/2021  
  
  
  
                                                    Last Documented On 06/10/202  
1 4:45PM ; [facility]  
  
  
  
                                                    Z68.42 - Body mass index [BM  
I]   
45.0-49.9, adult                        Medical Established Patient with   
Doreen Deborah CNP                        06/10/2021  
  
  
  
                                                    Last Documented On 06/10/202  
1 4:45PM ; [facility]  
  
  
  
                                        Post-traumatic stress disorder  Establ  
ished Patient with Leonie Short   
LISWS                                   2021  
  
  
  
                                                    Last Documented On   
1 11:59AM ; [facility]  
  
  
  
                                                    Assessment of visit for: bhargavi guevaraning for   
human immunodeficiency virus            Medical New Patient with Doreenpaola Mccormacken CNP                              2021  
  
  
  
                                                    Last Documented On   
1 4:06PM ; [facility]  
  
  
  
                                                    Diabetes Risk Test Score was  
 one score   
2021                               Medical New Patient with Doreen   
Deborah CNP                              2021  
  
  
  
                                                    Last Documented On   
1 4:06PM ; [facility]  
  
  
  
                                Hypertension    Medical New Patient with Doreen DAVID esposito CNP 2021  
  
  
  
                                                    Last Documented On   
1 4:06PM ; [facility]  
  
  
  
                                Morbid obesity  Medical New Patient with Doreen C  
cate CNP 2021  
  
  
  
                                                    Last Documented On   
1 4:06PM ; [facility]  
  
  
  
                                Post-traumatic stress disorder Medical New Patie  
nt with Doreen Cabrera CNP   
2021  
  
  
  
                                                    Last Documented On   
1 4:06PM ; [facility]  
  
  
  
                                                    Z68.42 - Body mass index [BM  
I]   
45.0-49.9, adult                        Medical New Patient with Doreen Cabrera CNP                              2021  
  
  
  
                                                    Last Documented On   
1 4:06PM ; [facility]  
Work Phone: [phone]- Evaluation note  
  
Includes: Assessments for all patient encounters  
  
  
  
                                Findings        Encounter       Date  
   
                                                    Attention-deficit hyperactiv  
ity   
disorder                                 [facility]                               2024  
  
  
  
                                                    Last Documented On   
4 4:15PM ; [facility]  
  
  
  
                                                    Bipolar I disorder, most rec  
ent   
episode, depressed - mild               BH [facility]                               2024  
  
  
  
                                                    Last Documented On   
4 4:15PM ; [facility]  
  
  
  
                                Nicotine dependence  Established Patient with   
[facility] 2024  
  
  
  
                                                    Last Documented On   
4 4:15PM ; [facility]  
  
  
  
                                        Post-traumatic stress disorder BH Establ  
ished Patient with [facility]   
[facility]                                     2024  
  
  
  
                                                    Last Documented On   
4 4:15PM ; [facility]  
  
  
  
                                                    [Z68.43 - Body mass index [B  
MI]   
50.0-59.9, adult] assessment of body   
mass index                              Medical Established Patient with   
Doreen Cabrera CNP                        2024  
  
  
  
                                                    Last Documented On 10/04/202  
4 1:56PM ; [facility]  
  
  
  
                                                    Bipolar I disorder, most rec  
ent   
episode, depressed - mild               Medical Established Patient with Doreenpaola Mccormacken CNP                              2024  
  
  
  
                                                    Last Documented On 10/04/202  
4 1:56PM ; [facility]  
  
  
  
                                Caries          Medical Established Patient with  
 Doreenpaola Mccormacken CNP 2024  
  
  
  
                                                    Last Documented On 10/04/202  
4 1:56PM ; [facility]  
  
  
  
                                        Encounter for Immunization Medical Estab  
lished Patient with Doreen Deborah   
CNP                                     2024  
  
  
  
                                                    Last Documented On 10/04/202  
4 1:56PM ; [facility]  
  
  
  
                                                    Type 2 diabetes mellitus wit  
hout   
complication                            Medical Established Patient with   
Doreen Cabrera CNP                        2024  
  
  
  
                                                    Last Documented On 10/04/202  
4 1:56PM ; [facility]  
  
  
  
                                        Attention-deficit hyperactivity disorder  
  Established Patient with   
Leonie Short [facility]                     2024  
  
  
  
                                                    Last Documented On   
4 3:28PM ; [facility]  
  
  
  
                                                    Bipolar I disorder, most rec  
ent   
episode, depressed - mild                Established Patient with Leonie   
Short [facility]                             2024  
  
  
  
                                                    Last Documented On   
4 3:28PM ; [facility]  
  
  
  
                                        Post-traumatic stress disorder  Establ  
ished Patient with Leonie Short   
[facility]                                   2024  
  
  
  
                                                    Last Documented On   
4 3:28PM ; [facility]  
  
  
  
                                                    [E11.9 - Type 2 diabetes lobito  
litus   
without complications] type 2 diabetes   
mellitus                                Medical Established Patient with   
Doreen Cabrera CNP                        2024  
  
  
  
                                                    Last Documented On   
4 7:36PM ; [facility]  
  
  
  
                                                    [F41.1 - Generalized anxiety  
   
disorder] generalized anxiety   
disorder                                Medical Established Patient with   
Doreen Cabrera CNP                        2024  
  
  
  
                                                    Last Documented On   
4 7:36PM ; [facility]  
  
  
  
                                                    [I10 - Essential (primary)   
hypertension] essential hypertension    Medical Established Patient with   
Doreen Cabrera CNP                        2024  
  
  
  
                                                    Last Documented On   
4 7:36PM ; [facility]  
  
  
  
                                                    [N94.6 - Dysmenorrhea, unspe  
cified]   
dysmenorrhea                            Medical Established Patient with   
Doreen Cabrera CNP                        2024  
  
  
  
                                                    Last Documented On   
4 7:36PM ; [facility]  
  
  
  
                                                    [Z68.43 - Body mass index [B  
MI]   
50.0-59.9, adult] assessment of body   
mass index                              Medical Established Patient with   
Doreen Cabrera CNP                        2024  
  
  
  
                                                    Last Documented On   
4 7:36PM ; [facility]  
  
  
  
                                        Encounter for Immunization Medical Estab  
lished Patient with Doreenpaola Cabrera   
CNP                                     2024  
  
  
  
                                                    Last Documented On   
4 7:36PM ; [facility]  
  
  
  
                                        Venipuncture was performed Medical Estab  
lished Patient with Doreen Cabrera   
CNP                                     2024  
  
  
  
                                                    Last Documented On   
4 7:36PM ; [facility]  
  
  
  
                                        Attention-deficit hyperactivity disorder  
  Established Patient with   
Leonie Short [facility]                     2024  
  
  
  
                                                    Last Documented On   
4 10:10AM ; [facility]  
  
  
  
                                                    Bipolar I disorder, most rec  
ent   
episode, depressed - mild               BH Established Patient with Leonie   
Short [facility]                             2024  
  
  
  
                                                    Last Documented On   
4 10:10AM ; [facility]  
  
  
  
                                        Post-traumatic stress disorder  Establ  
ished Patient with Leonie Short   
LIS[facility]                                   2024  
  
  
  
                                                    Last Documented On   
4 10:10AM ; [facility]  
  
  
  
                                        [D64.9 - Anemia, unspecified] anemia Med  
ical Established Patient with   
Doreenpaola Cabrera CNP                        2024  
  
  
  
                                                    Last Documented On   
4 6:51PM ; [facility]  
  
  
  
                                                    [Z68.43 - Body mass index [B  
MI]   
50.0-59.9, adult] assessment of body   
mass index                              Medical Established Patient with   
Doreen Cabrera CNP                        2024  
  
  
  
                                                    Last Documented On   
4 6:51PM ; [facility]  
  
  
  
                                                    Bipolar affective disorder,   
current   
episode depressed, mild                  Established Patient with Leonie   
Short [facility]                             2024  
  
  
  
                                                    Last Documented On   
4 5:16PM ; [facility]  
  
  
  
                                        Post-traumatic stress disorder  Establ  
ished Patient with Leonie Short   
[facility]                                   2024  
  
  
  
                                                    Last Documented On   
4 5:16PM ; [facility]  
  
  
  
                                                    Undifferentiated attention d  
eficit   
disorder                                 Established Patient with   
Leonie Short [facility]                     2024  
  
  
  
                                                    Last Documented On   
4 5:16PM ; [facility]  
  
  
  
                                        Visit for: screening for disorder  Est  
ablished Patient with Leonie   
Short [facility]                             2024  
  
  
  
                                                    Last Documented On   
4 5:16PM ; [facility]  
  
  
  
                                                    [Z68.43 - Body mass index [B  
MI]   
50.0-59.9, adult] assessment of body   
mass index                              Medical Established Patient with   
Doreen Cabrera CNP                        2024  
  
  
  
                                                    Last Documented On   
4 7:50PM ; [facility]  
  
  
  
                                        Attention-deficit hyperactivity disorder  
 Medical Established Patient with   
Doreen Cabrera CNP                        2024  
  
  
  
                                                    Last Documented On   
4 7:50PM ; [facility]  
  
  
  
                                                    Diabetes Risk Test Score was  
 three   
score 3/27/2024                         Medical Established Patient with Doreen Cabrera CNP                              2024  
  
  
  
                                                    Last Documented On   
4 7:50PM ; [facility]  
  
  
  
                                        Visit for: screening for STD Medical Est  
ablished Patient with Doreen Cabrera   
CNP                                     2024  
  
  
  
                                                    Last Documented On   
4 7:50PM ; [facility]  
  
  
  
                                                    [Z68.32 - Body mass index [B  
MI]   
32.0-32.9, adult] assessment of body   
mass index                              Medical Established Patient with   
Doreen Cabrera CNP                        2023  
  
  
  
                                                    Last Documented On   
3 6:01PM ; [facility]  
  
  
  
                                        Borderline personality disorder Medical   
Established Patient with Doreen Cabrera CNP                              2023  
  
  
  
                                                    Last Documented On   
3 6:01PM ; [facility]  
  
  
  
                                        Screening for diabetes mellitus Medical   
Established Patient with Doreen Cabrera CNP                              2023  
  
  
  
                                                    Last Documented On   
3 6:01PM ; [facility]  
  
  
  
                                        Assessment of body mass index Medical Es  
tablished Patient with Doreen Cabrera CNP                              10/21/2022  
  
  
  
                                                    Last Documented On 10/21/202  
2 2:45PM ; [facility]  
  
  
  
                                                    Bipolar affective disorder,   
current   
episode manic                            [facility] with Haylienicanor Martinerson LPCC-S                        2022  
  
  
  
                                                    Last Documented On   
2 3:22PM ; [facility]  
  
  
  
                                                    Bipolar affective disorder,   
current   
episode manic                            Established Patient with Haylie   
Aguilar LPCC-S                        2022  
  
  
  
                                                    Last Documented On   
2 2:28PM ; [facility]  
  
  
  
                                                    Bipolar affective disorder,   
current   
episode depressed, severe with   
psychosis                                [facility] with Haylie   
Aguilar LPCC-S                        2022  
  
  
  
                                                    Last Documented On   
2 3:29PM ; [facility]  
  
  
  
                                                    Assessment of body mass inde  
x [Body   
mass index [BMI] 50.0-59.9, adult]      Open Access - Established with Julieth Pisano CNP                               2022  
  
  
  
                                                    Last Documented On   
2 9:36AM ; [facility]  
  
  
  
                                                    Bipolar I disorder, most rec  
ent   
episode, manic                          Open Access - Established with Julieth   
Aliya CNP                               2022  
  
  
  
                                                    Last Documented On   
2 9:36AM ; [facility]  
  
  
  
                                        Post-traumatic stress disorder  Establ  
ished Patient with Haylie Aguilar   
LPCC-S                                  2022  
  
  
  
                                                    Last Documented On   
2 3:20PM ; [facility]  
  
  
  
                                No cough        Medical Established Patient with  
 Doreen Deborah CNP 2022  
  
  
  
                                                    Last Documented On   
2 4:04PM ; [facility]  
  
  
  
                                                    Z68.43 - Body mass index [BM  
I]   
50.0-59.9, adult                        Medical Established Patient with   
Doreen Deborah CNP                        2022  
  
  
  
                                                    Last Documented On   
2 4:04PM ; [facility]  
  
  
  
                                                    Borderline personality disor  
danny Pt   
reported hx of sx/dx                     Established Patient with Haylie   
Aguilar LPCC-S                        2022  
  
  
  
                                                    Last Documented On   
2 4:08PM ; [facility]  
  
  
  
                                                    Assessment of body mass inde  
x [Body   
mass index [BMI] 50.0-59.9, adult]      Open Access - Established with Julieth   
Aliya CNP                               2022  
  
  
  
                                                    Last Documented On   
2 7:41PM ; [facility]  
  
  
  
                                                    Diabetes Risk Test Score was  
 three   
score 2022                         Open Access - Established with Julieth   
Aliya CNP                               2022  
  
  
  
                                                    Last Documented On   
2 7:41PM ; [facility]  
  
  
  
                                                    Bipolar I disorder, most rec  
ent   
episode, manic                           Established Patient with Avis   
Felder LPCC-S                          2021  
  
  
  
                                                    Last Documented On   
1 1:35AM ; [facility]  
  
  
  
                                        Borderline personality disorder  Estab  
lished Patient with Avis   
Felder LPCC-S                          2021  
  
  
  
                                                    Last Documented On   
1 1:35AM ; [facility]  
  
  
  
                                        Post-traumatic stress disorder  Establ  
ished Patient with Avis   
Felder LPCC-S                          2021  
  
  
  
                                                    Last Documented On   
1 1:35AM ; [facility]  
  
  
  
                                                    Assessment of visit for: bhargavi myles for   
human immunodeficiency virus            Medical Established Patient with   
Doreen Deborah CNP                        2021  
  
  
  
                                                    Last Documented On   
1 2:56PM ; [facility]  
  
  
  
                                Nicotine dependence Medical Established Patient   
with Doreen Deborah CNP 2021  
  
  
  
                                                    Last Documented On   
1 2:56PM ; [facility]  
  
  
  
                                Tachycardia     Medical Established Patient with  
 Doreen Deborah CNP 2021  
  
  
  
                                                    Last Documented On   
1 2:56PM ; [facility]  
  
  
  
                                                    Z68.43 - Body mass index [BM  
I]   
50.0-59.9, adult                        Medical Established Patient with   
Doreen Deborah CNP                        2021  
  
  
  
                                                    Last Documented On   
1 2:56PM ; [facility]  
  
  
  
                                                    Bipolar I disorder, most rec  
ent   
episode, manic                           Established Patient with Leonie   
Short [facility]                             2021  
  
  
  
                                                    Last Documented On   
1 10:17AM ; [facility]  
  
  
  
                                                    Borderline personality disor  
danny per   
patient reported history                 Established Patient with Leonie   
Short [facility]                             2021  
  
  
  
                                                    Last Documented On   
1 10:17AM ; [facility]  
  
  
  
                                Nicotine dependence  Established Patient with   
Leonie Short LISWS 2021  
  
  
  
                                                    Last Documented On   
1 10:17AM ; [facility]  
  
  
  
                                        Post-traumatic stress disorder  Establ  
ished Patient with Leonie Short   
LISWS                                   2021  
  
  
  
                                                    Last Documented On   
1 10:17AM ; [facility]  
  
  
  
                                Body mass index Medical Established Patient with  
 Doreen Deborah CNP 2021  
  
  
  
                                                    Last Documented On   
1 5:23PM ; [facility]  
  
  
  
                                Morbid obesity  Medical Established Patient with  
 Doreen Deborah CNP 2021  
  
  
  
                                                    Last Documented On   
1 5:23PM ; [facility]  
  
  
  
                                        Nicotine dependence uncomplicated Medica  
l Established Patient with Doreen   
Deborah CNP                              2021  
  
  
  
                                                    Last Documented On   
1 5:23PM ; [facility]  
  
  
  
                                                    Z68.42 - Body mass index [BM  
I]   
45.0-49.9, adult                        Medical Established Patient with   
Doreen Deborah CNP                        2021  
  
  
  
                                                    Last Documented On   
1 5:23PM ; [facility]  
  
  
  
                                Episodic mood disorders On Call with Pamela edwards CNP 2021  
  
  
  
                                                    Last Documented On   
1 7:49AM ; [facility]  
  
  
  
                                                    Mood disorders, NOS per tabatha  
ent   
reported history                         Established Patient with Leonie   
Short [facility]                             2021  
  
  
  
                                                    Last Documented On   
1 7:15PM ; [facility]  
  
  
  
                                        Post-traumatic stress disorder BH Establ  
ished Patient with Leonie Short   
LISWS                                   2021  
  
  
  
                                                    Last Documented On   
1 7:15PM ; [facility]  
  
  
  
                                Morbid obesity  Medical Established Patient with  
 Doreen Deborah CNP 2021  
  
  
  
                                                    Last Documented On   
1 12:31PM ; [facility]  
  
  
  
                                Otitis externa  Medical Established Patient with  
 Doreen Deborah CNP 2021  
  
  
  
                                                    Last Documented On   
1 12:31PM ; [facility]  
  
  
  
                                                    Z68.42 - Body mass index [BM  
I]   
45.0-49.9, adult                        Medical Established Patient with   
Doreen Deborah CNP                        2021  
  
  
  
                                                    Last Documented On   
1 12:31PM ; [facility]  
  
  
  
                                        Post-traumatic stress disorder  Establ  
ished Patient with Leonie Short   
LISWS                                   06/10/2021  
  
  
  
                                                    Last Documented On 06/10/202  
1 10:05PM ; [facility]  
  
  
  
                                Morbid obesity  Medical Established Patient with  
 Doreen Deborah CNP 06/10/2021  
  
  
  
                                                    Last Documented On 06/10/202  
1 4:45PM ; [facility]  
  
  
  
                                                    Z68.42 - Body mass index [BM  
I]   
45.0-49.9, adult                        Medical Established Patient with   
Doreen Deborah CNP                        06/10/2021  
  
  
  
                                                    Last Documented On 06/10/202  
1 4:45PM ; [facility]  
  
  
  
                                        Post-traumatic stress disorder  Establ  
ished Patient with Leonie Short   
LISWS                                   2021  
  
  
  
                                                    Last Documented On   
1 11:59AM ; [facility]  
  
  
  
                                                    Assessment of visit for: bhargavi myles for   
human immunodeficiency virus            Medical New Patient with Doreen Cabrera CNP                              2021  
  
  
  
                                                    Last Documented On   
1 4:06PM ; [facility]  
  
  
  
                                                    Diabetes Risk Test Score was  
 one score   
2021                               Medical New Patient with Doreen   
Deborah CNP                              2021  
  
  
  
                                                    Last Documented On   
1 4:06PM ; [facility]  
  
  
  
                                Hypertension    Medical New Patient with Doreen esposito CNP 2021  
  
  
  
                                                    Last Documented On   
1 4:06PM ; [facility]  
  
  
  
                                Morbid obesity  Medical New Patient with Doreen esposito CNP 2021  
  
  
  
                                                    Last Documented On   
1 4:06PM ; [facility]  
  
  
  
                                Post-traumatic stress disorder Medical New Patie  
nt with Doreen Cabrera CNP   
2021  
  
  
  
                                                    Last Documented On   
1 4:06PM ; [facility]  
  
  
  
                                                    Z68.42 - Body mass index [BM  
I]   
45.0-49.9, adult                        Medical New Patient with Doreen Cabrera CNP                              2021  
  
  
  
                                                    Last Documented On   
1 4:06PM ; [facility]  
Work Phone: [phone]- Progress note*   
  
Progress note  
  
  
  
                                Date            Encounter       Last Documented   
by  
   
                                2024       Established Patient Last docu  
mented on 2024; 4:15 PM, Radha MONTERO; [facility]  
  
  
  
                                                      
  
  
** Active Problems & Conditions **  
- F90.9 - Attention-deficit Hyperactivity Disorder  
- F31.31 - Bipolar I Disorder, Most Recent Episode, Depressed Mild  
- E11.9 - Diabetes Mellitus Type 2 Without Complication  
- N94.6 - Dysmenorrhea  
- F43.10 - Post-traumatic Stress Disorder  
  
** Subjective **  
[facility] met with patient for mood and medications.  
PHQ9 score 18 ESTHELA score 21  
Patient reports that her depression and anxiety are not good at the moment.  
She is not sleeping well and does not feel that the trazedone is helping. She 
would   
like to start seroquel.  
Patient would like to go back to counseling and was provided resources.  
Pt is reporting that she is haivng nightmares and they are from her past.  
Discussed EMDR therapy for Pt to address her PTSD.  
Encouraged patient to engage in more physical activity to help with sleep. She   
reports that she just not tired to be able to sleep but feels tired. . Pt would 
like   
to walk around a track but is fearful that she will see her father.  
Pt was present with her mother who agreed to go with her walking.  
Discussed wtih patient the benefits of seeing a psychiatrist for evaluation for 
ADHD.  
  
** Chief Complaint **  
The Chief Complaint is: Follow up for mood and medications.  
  
** History of Present Illness **  
Linnette Beckham is a 24 year old female.  
- Appetite not normal - Irritability  
- Anxiety - Depression severe - Sleep disturbances Will be starting seroquel - 
Energy   
level is good - Easily distracted  
  
** Current Medication **  
- Alcohol Pads 70% use to cleanse skin prior to checking blood sugars, 90 days, 
3   
refills  
- ARIPiprazole 5 MG Oral Tablet take 1 tablet by mouth once daily, 30 days, 5 
refills  
- Atorvastatin Calcium 20 MG Oral Tablet take 1 tablet by mouth once daily at   
bedtime, 30 days, 5 refills  
- Blood Glucose System Sriram Kit use to check sugars once daily (use what is 
covered),   
30 days, 0 refills  
- busPIRone HCl 10 MG Oral Tablet take 1 tablet by mouth twice daily (d/c 5 mg 
rx),   
30 days, 5 refills  
- CareSens Lancets Miscellaneous use to check sugars once daily (dispense what 
is   
covered), 90 days, 3 refills  
- Colace 100 MG Oral Capsule take 1 capsule by mouth once daily at bedtime as 
needed   
for constipation, 30 days, 5 refills  
- [employer] Iron 325 (65 Fe) MG Oral Tablet take 1 tablet by mouth once daily, 30 
days, 5   
refills  
- Intuniv 1 MG Oral Tablet Extended Release 24 Hour take 1 tablet by mouth once 
daily   
at bedtime, 30 days, 5 refills  
- Intuniv 1 MG Oral Tablet Extended Release 24 Hour take 1 tablet by mouth once 
daily   
at bedtime, 30 days, 5 refills  
- Januvia 50 MG Oral Tablet take 1 tablet by mouth once daily, 30 days, 5 
refills  
- lamoTRIgine 100 MG Oral Tablet take 1 tablet by mouth once daily, 30 days, 5   
refills  
- lamoTRIgine 100 MG Oral Tablet take 1 tablet by mouth once daily, 30 days, 5   
refills  
- Lisinopril 5 MG Oral Tablet take 1 tablet by mouth once daily, 30 days, 5 
refills  
- MiraLax 17 GM/SCOOP Oral Powder mix 1 scoop with 8 ounces once daily, 30 days,
 2   
refills  
- Narcan 4 MG/0.1ML Nasal Liquid spray in nostril for symptoms of overdose , may
   
repeat in 2 minutes if symptoms persist, 1 days, 0 refills  
- OneTouch Ultra In Vitro Strip USE ONE TEST STRIP TO CHECK BLOOD SUGARS ONCE 
DAILY,   
90 days, 3 refills  
- Prazosin HCl 1 MG Oral Capsule take 1 capsule by mouth twice daily, 30 days, 5
   
refills  
- Prazosin HCl 1 MG Oral Capsule take 1 capsule by mouth twice daily, 30 days, 5
   
refills  
- SEROquel 50 MG Oral Tablet take 1 tablet by mouth once daily at bedtime (d/c   
trazodone), 30 days, 1 refills  
- Sertraline HCl 50 MG Oral Tablet take 1 tablet by mouth once daily, 30 days, 5
   
refills  
- Sertraline HCl 50 MG Oral Tablet take 1 tablet by mouth once daily, 30 days, 5
   
refills  
- Slynd 4 MG Oral Tablet take 1 tablet by mouth at the same time everyday to 
help   
regulate your period, 28 days, 2 refills  
  
** Past Medical/Surgical History **  
Reported:  
No Safety Measures. Has sex without a condom.  
Medical: No previous hospitalizations. Chronic illness and Sexually transmitted   
infection Partners sexually transmitted infection status known.  
Immunization History: Recent immunization for flu.  
Exposure: Exposure to COVID-19.  
Pregnancy: Previously pregnant 1 time(s) and para having 0 live birth(s). Not   
planning a pregnancy in the next year.  
Diagnoses:  
Polycystic Ovarian Syndrome (PCOS).  
Migraine headache.  
Psychiatric disorders biopolar disorder  
Anxiety disorder  
POTS.  
Procedural:  
- Insertion of ear pressure equalization tubes in both ears  
Surgical:  
- Tonsillectomy  
- Tonsillectomy with adenoidectomy  
  
** Social History **  
Environmental Exposure: Secondhand cigarette smoke exposure.  
Personal: Recent emotional stress.  
Tobacco use: Using electronic cigarettes/vaping.  
Alcohol: Not using alcohol.  
Drug Use: Not using drugs.  
Housing And Economic Circumstances: Lives with parents.  
Sexual: Sexual orientation Lesbian or David and gender identity Other.  
  
** Allergies **  
- Abilify Reaction: groggy  
- Augmentin Reaction: Shock  
- Metformin Reaction: Nausea, Vomiting  
- NO KNOWN ENVIRONMENTAL ALLERGIES  
- NO KNOWN FOOD ALLERGIES  
- Sertraline Reaction: slow/groggy  
- Trazodone Hydrochloride Reaction: Panic attack  
  
** Family History **  
Paternal:  
Systemic hypertension  
Oncologic disorder  
Maternal:  
Systemic hypertension  
Epilepsy and recurrent seizures  
Psychiatric disorders  
Oncologic disorder  
Fraternal:  
Psychiatric disorders  
  
** Physical Findings **  
General Appearance:  
- Normal Appearance.  
Neurological:  
- Cognitive Functions was Normal. - Oriented to time, place, and person. - No   
hallucinations. - Judgement was not impaired.  
Speech: - Is Normal. - No articulation abnormalities.  
Psychiatric:  
- Mood is Euthymic. - Attitude Open.  
Appearance: - Normal.  
Demonstrated Behavior: - Motor Activity Normal Activity. - Eye Contact 
Appropriate.  
Affect: - Congruent with the mood.  
Thought Processes: - Not impaired.  
Thought Content: - Revealed no impairment. - Insight was intact. - No delusions.
 - No   
suicidal ideation. - No Passive thoughts of Death. - No suicidal plans. - No 
suicidal   
intent. - No homicidal ideations. - No homicidal plans. - No homicidal intent.  
Past Medical:  
- No repetitive self injurious behavior.  
  
** Assessment **  
- Attention-deficit hyperactivity disorder [F90.9 - Attention-deficit 
hyperactivity   
disorder, unspecified type]  
- Nicotine dependence [F17.200 - Nicotine dependence, unspecified, 
uncomplicated]  
- Bipolar I disorder, most recent episode, depressed - mild [F31.31 - Bipolar   
disorder, current episode depressed, mild]  
- Post-traumatic stress disorder [F43.10 - Post-traumatic stress disorder,   
unspecified]  
  
** Therapy **  
- Developmental/Behavioral Screening & Testing - PHQ9 and 
Developmental/Behavioral   
Screening & Testing - GAD7.  
- Brief solution-focused.  
- Adherent with medications.  
-  Visit 30 Minutes.  
- Referral to mental health team.  
- Plan - Begin taking the seroquel at bedtime and Collaborated with patient and   
provider:  
  
** Counseling/Education **  
*BHP offered active and supportive listening, normalized emotions and feelings, 
and   
processed  
current stressors.  
*Discussed engageing in counseling and looking into EMDR to address PTSD and   
increased nightmares.  
  
** Plan **  
*Continue to take medication(s) as prescribed.  
*BHP to follow-up with patient at next visit as scheduled.  
*Patient to follow up as needed/scheduled.  
  
** Care Team **  
- Doreen Cabrera CNP  
  
** Health Reminders **  
- Assess Tobacco Use satisfied 2024.  
- ESTHELA-2 satisfied 2024.  
- PHQ9 / PHQA satisfied 2024.  
  
** User Defined 1 **  
ESTHELA-2 score was six 2024, ESTHELA-7 score was 21 [ESTHELA-7] Feeling nervous, 
anxious or   
on edge? + 3 pt : Nearly every day, [ESTHELA-7] Feeling nervous, anxious or on edge?
+ 3   
pt : Nearly every day, [ESTHELA-7] Not being able to stop or control worrying? + 3 
pt :   
Nearly every day, [ESTHELA-7] Not being able to stop or control worrying? + 3 pt : 
Nearly   
every day, [ESTHELA-7] Worrying too much about different things? + 3 pt : Nearly 
every   
day, [ESTHELA-7] Trouble relaxing? + 3 pt : Nearly every day, [ESTHELA-7] Being so 
restless   
that it's hard to sit still? + 3 pt : Nearly every day, [ESTHELA-7] Becoming easily   
annoyed or irritable? + 3 pt : Nearly every day, [ESTHELA-7] Feeling afraid as if   
something awful might happen? + 3 pt : Nearly every day, Patient Health 
Questionnaire   
9-Item total score was 18 2024 If you checked off problems, how difficult 
is it   
for you to do your work? + : Very difficult, [PHQ-9-1] Little interest or 
pleasure in   
doing things? + 3 pt : Nearly every day, [PHQ-9-2] Feeling down, depressed, or   
hopeless? + 3 pt : Nearly every day, [PHQ-9-3] Trouble falling or staying asleep
 or   
sleeping too much? + 3 pt : Nearly every day, [PHQ-9-4] Feeling tired or having   
little energy? + 3 pt : Nearly every day, [PHQ-9-5] Poor appetite or overeating?
 + 3   
pt : Nearly every day, [PHQ-9-6] Feeling bad about yourself-or that you are a 
failure   
+ 0 pt : Not at all, [PHQ-9-7] Trouble concentrating on things such as reading 
the   
newspaper + 3 pt : Nearly every day, [PHQ-9-8] Moving or speaking so slowly that
   
other people have noticed. + 0 pt : Not at all, [PHQ-9-9] Thoughts that you 
would be   
better off dead or hurting yourself? + 0 pt : Not at all, and ESTHELA If you checked
 off   
problems, how difficult is it for you to do your work, take care of things, or 
get   
along? Very difficult.  
  
  
[facility]09- Progress note*   
  
Progress note  
  
  
  
                                Date            Encounter       Last Documented   
by  
   
                                2024      Medical Established Patient Last  
 documented on 10/04/2024; 1:56 PM,   
Doreen Cabrera CNP; [facility]  
  
  
  
                                                      
  
  
** Active Problems & Conditions **  
- F90.9 - Attention-deficit Hyperactivity Disorder  
- F31.31 - Bipolar I Disorder, Most Recent Episode, Depressed Mild  
- E11.9 - Diabetes Mellitus Type 2 Without Complication  
- N94.6 - Dysmenorrhea  
- F43.10 - Post-traumatic Stress Disorder  
  
** Chief Complaint **  
The Chief Complaint is: Patient reports her mental health is a problem. Patient 
isn't   
sure the zoloft is working for her. Patient said she is feeling very down. 
Patient is   
not sleeping, but has not picked up seroquel. Patient has been still taking the   
trazodone.  
  
** Referred Here **  
Not referred by urgent care clinic and not the emergency room. No prior 
encounters.  
- Data to be reviewed: no clinical lab tests  
  
** History of Present Illness **  
Linnette Beckham is a 24 year old female.  
- Allergy list reviewed - Reviewed Medications - Medication list reviewed  
- Date of last menstruation 2024 - Pregnancy test - would not like a 
pregnancy   
test  
Presents for med follow up , did not know seroquel was ready at the pharmacy so 
hasnt   
been taking . we do not have any hpv vaccines here today , needs 2nd dose. is 
going   
thru a vape about every 3 days , wants to quit but aware it would be better to 
wait   
until mental health is better , advised on the harm of nicotine / vapes  
  
** Current Medication **  
- Alcohol Pads 70% use to cleanse skin prior to checking blood sugars, 90 days, 
3   
refills  
- ARIPiprazole 5 MG Oral Tablet take 1 tablet by mouth once daily, 30 days, 5 
refills  
- Atorvastatin Calcium 20 MG Oral Tablet take 1 tablet by mouth once daily at   
bedtime, 30 days, 5 refills  
- Blood Glucose System Sriram Kit use to check sugars once daily (use what is 
covered),   
30 days, 0 refills  
- busPIRone HCl 10 MG Oral Tablet take 1 tablet by mouth twice daily (d/c 5 mg 
rx),   
30 days, 5 refills  
- CareSens Lancets Miscellaneous use to check sugars once daily (dispense what 
is   
covered), 90 days, 3 refills  
- Colace 100 MG Oral Capsule take 1 capsule by mouth once daily at bedtime as 
needed   
for constipation, 30 days, 5 refills  
- [employer] Iron 325 (65 Fe) MG Oral Tablet take 1 tablet by mouth once daily, 30 
days, 5   
refills  
- Intuniv 1 MG Oral Tablet Extended Release 24 Hour take 1 tablet by mouth once 
daily   
at bedtime, 30 days, 5 refills  
- Januvia 50 MG Oral Tablet take 1 tablet by mouth once daily, 30 days, 5 
refills  
- lamoTRIgine 100 MG Oral Tablet take 1 tablet by mouth once daily, 30 days, 5   
refills  
- Lisinopril 5 MG Oral Tablet take 1 tablet by mouth once daily, 30 days, 5 
refills  
- MiraLax 17 GM/SCOOP Oral Powder mix 1 scoop with 8 ounces once daily, 30 days,
 2   
refills  
- Narcan 4 MG/0.1ML Nasal Liquid spray in nostril for symptoms of overdose , may
   
repeat in 2 minutes if symptoms persist, 1 days, 0 refills  
- OneTouch Ultra In Vitro Strip USE ONE TEST STRIP TO CHECK BLOOD SUGARS ONCE 
DAILY,   
90 days, 3 refills  
- Prazosin HCl 1 MG Oral Capsule take 1 capsule by mouth twice daily, 30 days, 5
   
refills  
- SEROquel 50 MG Oral Tablet take 1 tablet by mouth once daily at bedtime (d/c   
trazodone), 30 days, 1 refills  
- Sertraline HCl 50 MG Oral Tablet take 1 tablet by mouth once daily, 30 days, 5
   
refills  
- Slynd 4 MG Oral Tablet take 1 tablet by mouth at the same time everyday to 
help   
regulate your period, 28 days, 2 refills  
  
** Past Medical/Surgical History **  
Reported:  
Has sex without a condom.  
Medical: No previous hospitalizations. Chronic illness and Sexually transmitted   
infection Partners sexually transmitted infection status known.  
Exposure: Exposure to COVID-19.  
Pregnancy: Previously pregnant 1 time(s) and para having 0 live birth(s). Not   
planning a pregnancy in the next year.  
Legal Documents: Consent form on file for procedure.  
Diagnoses:  
Polycystic Ovarian Syndrome (PCOS).  
Migraine headache.  
Psychiatric disorders biopolar disorder  
Anxiety disorder  
POTS.  
Procedural:  
- Insertion of ear pressure equalization tubes in both ears  
Surgical:  
- Tonsillectomy  
- Tonsillectomy with adenoidectomy  
  
** Social History **  
Environmental Exposure: Secondhand cigarette smoke exposure.  
Tobacco use: Using electronic cigarettes/vaping.  
Alcohol: Not using alcohol.  
Drug Use: Not using drugs denied by patient.  
Sexual: Sexually active, sexual orientation Lesbian or David, gender identity 
Other,   
and birth control is being practiced Pills.  
  
** Allergies **  
- Abilify Reaction: groggy  
- Augmentin Reaction: Shock  
- Metformin Reaction: Nausea, Vomiting  
- NO KNOWN ENVIRONMENTAL ALLERGIES  
- NO KNOWN FOOD ALLERGIES  
- Sertraline Reaction: slow/groggy  
- Trazodone Hydrochloride Reaction: Panic attack  
  
** Family History **  
Paternal:  
Systemic hypertension  
Oncologic disorder  
Maternal:  
Systemic hypertension  
Epilepsy and recurrent seizures  
Psychiatric disorders  
Oncologic disorder  
Fraternal:  
Psychiatric disorders  
  
** Review Of Systems **  
Head: No head symptoms.  
Neck: No neck symptoms.  
Eyes: No eye symptoms.  
Otolaryngeal: No ear symptoms, no nasal symptoms, no nose and sinus finding, no   
throat symptoms, no oral cavity symptoms, and no jaw symptoms.  
Breasts: No breast symptoms.  
Cardiovascular: No cardiovascular symptoms and no chest pain or discomfort.  
Pulmonary: No pulmonary symptoms.  
Gastrointestinal: No gastrointestinal symptoms.  
Genitourinary: No genitourinary symptoms.  
Musculoskeletal: No musculoskeletal symptoms.  
Neurological: No neurological symptoms.  
Psychological: No psychological symptoms.  
Skin: No skin symptoms.  
Cardiovascular: Edema not present.  
  
** Physical Findings **  
- Vitals taken 2024 04:27 pm  
BP-Sitting L135/86 mmHg  
BP Cuff SizeLarge  
Pulse Rate-Akfcgic143 bpm  
Respiration Rate18 per min  
Temp-Oral97.6 F  
Wsgnak00 in  
Yboqof101 lbs  
Body Mass Index57.2 kg/m2  
Body Surface Area2.4 m2  
Oxygen Rqmsahudta75 %  
  
Vital Signs:  
- Systolic blood pressure 130 - 139 mmHg.  
- Diastolic blood pressure 80-89 mmHg.  
General Appearance:  
- Awake. - Alert. - Well developed. - Well nourished. - In no acute distress.  
Lungs:  
- Respiration rhythm and depth was normal. - Clear to auscultation.  
Cardiovascular:  
Heart Rate And Rhythm: - Normal.  
Heart Sounds: - Normal.  
Murmurs: - No murmurs were heard.  
Musculoskeletal System:  
General/bilateral: - Normal movement of all extremities.  
Foot:  
General/bilateral: - Normal 10-g monofilament exam of right foot. - Normal 10-g   
monofilament exam of left foot.  
Skin:  
- General appearance was normal.  
Physician's Services:  
- Diabetic Foot Exam Abnormal  
  
** Tests **  
Blood Analysis:  
Blood Endocrine Laboratory Tests: Value  
Blood glucose level by fingerstick Non-fasting 122 mg/dl  
Laboratory-based Chemistry:  
Other Laboratory Tests:  
Screening for sexually transmitted infections was not performed.  
Imaging:  
Ophthalmoscopy:  
Optomap completed Both eyes (OU) and with physician / healthcare professional   
interpretation and report.  
  
** Assessment **  
- Z68.43 - Body mass index [BMI] 50.0-59.9, adult  
- Z23 - Encounter for immunization  
- K02.9 - Dental caries, unspecified  
- E11.9 - Type 2 diabetes mellitus without complications  
- F31.31 - Bipolar disorder, current episode depressed, mild  
  
** Previous Tests **  
Laboratory Studies:  
Renal Function:  
Glomerular filtration rate 2024 >60.  
  
** Therapy **  
- Patient has agreed to receive flu vaccine today.  
- Silver diamine fluoride 38% application by physician or other QHP 10 Teeth.  
- Immunization administration, by injection, one vaccine.  
  
** Vaccinations **  
- Fluarix 0.5mL for 6 months and older Dose #2 Status: Administered Date: 
2024  
  
- Received dose of Fluarix 0.5mL (6 months and up)  
  
** Counseling/Education **  
- Wishing to stop using electronic cigarettes/vaping  
- Discussed nutritional needs teach healthy choices including fruits and 
vegetables  
- Patient education about a proper diet  
- Discussed concerns about exercise: promote physical activity  
  
** Plan **  
StartCited- Attention-deficit hyperactivity disorder, unspecified type  
Intuniv 1 MG tablet take 1 tablet by mouth once daily at bedtime, 30 days, 5 
refills  
EndCited  
StartCited- Other  
Follow-up Appointment  
1 month med check  
lamoTRIgine 100 MG tablet take 1 tablet by mouth once daily, 30 days, 5 refills  
Prazosin HCl 1 MG capsule take 1 capsule by mouth twice daily, 30 days, 5 
refills  
Sertraline HCl 50 MG tablet take 1 tablet by mouth once daily, 30 days, 5 
refills  
EndCited  
** Care Team **  
- Doreen Cabrera CNP  
  
** Health Reminders **  
- Assess BMI satisfied 2024.  
- Assess Tobacco Use satisfied 2024.  
- Eye Exam satisfied 2024.  
- Follow Up Plan BMI Management satisfied 2024.  
- Foot Exam satisfied 2024.  
  
** User Defined 1 **  
Not planning a pregnancy in the next year.  
  
** Bottom of Document **  
Illustration  
  
  
[facility]09- Progress note*   
  
Progress note  
  
  
  
                                Date            Encounter       Last Documented   
by  
   
                                2024      Medical Established Patient Last  
 documented on 10/09/2024; 2:09 PM,   
Doreen Cabrera CNP; [facility]  
  
  
  
                                                      
  
  
** Active Problems & Conditions **  
- F90.9 - Attention-deficit Hyperactivity Disorder  
- F31.31 - Bipolar I Disorder, Most Recent Episode, Depressed Mild  
- E11.9 - Diabetes Mellitus Type 2 Without Complication  
- N94.6 - Dysmenorrhea  
- F43.10 - Post-traumatic Stress Disorder  
  
** Chief Complaint **  
The Chief Complaint is: Patient reports her mental health is a problem. Patient 
isn't   
sure the zoloft is working for her. Patient said she is feeling very down. 
Patient is   
not sleeping, but has not picked up seroquel. Patient has been still taking the   
trazodone.  
  
** Referred Here **  
Not referred by urgent care clinic and not the emergency room. No prior 
encounters.  
- Data to be reviewed: no clinical lab tests  
  
** History of Present Illness **  
Linnette Beckham is a 24 year old female.  
- Allergy list reviewed - Reviewed Medications - Medication list reviewed  
- Date of last menstruation 2024 - Pregnancy test - would not like a 
pregnancy   
test  
Presents for med follow up , did not know seroquel was ready at the pharmacy so 
hasnt   
been taking . we do not have any hpv vaccines here today , needs 2nd dose. is 
going   
thru a vape about every 3 days , wants to quit but aware it would be better to 
wait   
until mental health is better , advised on the harm of nicotine / vapes  
  
** Current Medication **  
- Alcohol Pads 70% use to cleanse skin prior to checking blood sugars, 90 days, 
3   
refills  
- ARIPiprazole 5 MG Oral Tablet take 1 tablet by mouth once daily, 30 days, 5 
refills  
- Atorvastatin Calcium 20 MG Oral Tablet take 1 tablet by mouth once daily at   
bedtime, 30 days, 5 refills  
- Blood Glucose System Sriram Kit use to check sugars once daily (use what is 
covered),   
30 days, 0 refills  
- busPIRone HCl 10 MG Oral Tablet take 1 tablet by mouth twice daily (d/c 5 mg 
rx),   
30 days, 5 refills  
- CareSens Lancets Miscellaneous use to check sugars once daily (dispense what 
is   
covered), 90 days, 3 refills  
- Colace 100 MG Oral Capsule take 1 capsule by mouth once daily at bedtime as 
needed   
for constipation, 30 days, 5 refills  
- [facility] Iron 325 (65 Fe) MG Oral Tablet take 1 tablet by mouth once daily, 30 
days, 5   
refills  
- Intuniv 1 MG Oral Tablet Extended Release 24 Hour take 1 tablet by mouth once 
daily   
at bedtime, 30 days, 5 refills  
- Januvia 50 MG Oral Tablet take 1 tablet by mouth once daily, 30 days, 5 
refills  
- lamoTRIgine 100 MG Oral Tablet take 1 tablet by mouth once daily, 30 days, 5   
refills  
- Lisinopril 5 MG Oral Tablet take 1 tablet by mouth once daily, 30 days, 5 
refills  
- MiraLax 17 GM/SCOOP Oral Powder mix 1 scoop with 8 ounces once daily, 30 days,
 2   
refills  
- Narcan 4 MG/0.1ML Nasal Liquid spray in nostril for symptoms of overdose , may
   
repeat in 2 minutes if symptoms persist, 1 days, 0 refills  
- OneTouch Ultra In Vitro Strip USE ONE TEST STRIP TO CHECK BLOOD SUGARS ONCE 
DAILY,   
90 days, 3 refills  
- Prazosin HCl 1 MG Oral Capsule take 1 capsule by mouth twice daily, 30 days, 5
   
refills  
- SEROquel 50 MG Oral Tablet take 1 tablet by mouth once daily at bedtime (d/c   
trazodone), 30 days, 1 refills  
- Sertraline HCl 50 MG Oral Tablet take 1 tablet by mouth once daily, 30 days, 5
   
refills  
- Slynd 4 MG Oral Tablet take 1 tablet by mouth at the same time everyday to 
help   
regulate your period, 28 days, 2 refills  
  
** Past Medical/Surgical History **  
Reported:  
Has sex without a condom.  
Medical: No previous hospitalizations. Chronic illness and Sexually transmitted   
infection Partners sexually transmitted infection status known.  
Exposure: Exposure to COVID-19.  
Pregnancy: Previously pregnant 1 time(s) and para having 0 live birth(s). Not   
planning a pregnancy in the next year.  
Legal Documents: Consent form on file for procedure.  
Diagnoses:  
Polycystic Ovarian Syndrome (PCOS).  
Migraine headache.  
Psychiatric disorders biopolar disorder  
Anxiety disorder  
POTS.  
Procedural:  
- Insertion of ear pressure equalization tubes in both ears  
Surgical:  
- Tonsillectomy  
- Tonsillectomy with adenoidectomy  
  
** Social History **  
Environmental Exposure: Secondhand cigarette smoke exposure.  
Tobacco use: Using electronic cigarettes/vaping.  
Alcohol: Not using alcohol.  
Drug Use: Not using drugs denied by patient.  
Sexual: Sexually active, sexual orientation Lesbian or David, gender identity 
Other,   
and birth control is being practiced Pills.  
  
** Allergies **  
- Abilify Reaction: groggy  
- Augmentin Reaction: Shock  
- Metformin Reaction: Nausea, Vomiting  
- NO KNOWN ENVIRONMENTAL ALLERGIES  
- NO KNOWN FOOD ALLERGIES  
- Sertraline Reaction: slow/groggy  
- Trazodone Hydrochloride Reaction: Panic attack  
  
** Family History **  
Paternal:  
Systemic hypertension  
Oncologic disorder  
Maternal:  
Systemic hypertension  
Epilepsy and recurrent seizures  
Psychiatric disorders  
Oncologic disorder  
Fraternal:  
Psychiatric disorders  
  
** Review Of Systems **  
Head: No head symptoms.  
Neck: No neck symptoms.  
Eyes: No eye symptoms.  
Otolaryngeal: No ear symptoms, no nasal symptoms, no nose and sinus finding, no   
throat symptoms, no oral cavity symptoms, and no jaw symptoms.  
Breasts: No breast symptoms.  
Cardiovascular: No cardiovascular symptoms and no chest pain or discomfort.  
Pulmonary: No pulmonary symptoms.  
Gastrointestinal: No gastrointestinal symptoms.  
Genitourinary: No genitourinary symptoms.  
Musculoskeletal: No musculoskeletal symptoms.  
Neurological: No neurological symptoms.  
Psychological: No psychological symptoms.  
Skin: No skin symptoms.  
Cardiovascular: Edema not present.  
  
** Physical Findings **  
- Vitals taken 2024 04:27 pm  
BP-Sitting L135/86 mmHg  
BP Cuff SizeLarge  
Pulse Rate-Dbdxtzb398 bpm  
Respiration Rate18 per min  
Temp-Oral97.6 F  
Tcvyoz11 in  
Wixzgh199 lbs  
Body Mass Index57.2 kg/m2  
Body Surface Area2.4 m2  
Oxygen Nreyraspde12 %  
  
Vital Signs:  
- Systolic blood pressure 130 - 139 mmHg.  
- Diastolic blood pressure 80-89 mmHg.  
General Appearance:  
- Awake. - Alert. - Well developed. - Well nourished. - In no acute distress.  
Lungs:  
- Respiration rhythm and depth was normal. - Clear to auscultation.  
Cardiovascular:  
Heart Rate And Rhythm: - Normal.  
Heart Sounds: - Normal.  
Murmurs: - No murmurs were heard.  
Musculoskeletal System:  
General/bilateral: - Normal movement of all extremities.  
Foot:  
General/bilateral: - Normal 10-g monofilament exam of right foot. - Normal 10-g   
monofilament exam of left foot.  
Skin:  
- General appearance was normal.  
Physician's Services:  
- Diabetic Foot Exam Abnormal  
  
** Tests **  
Blood Analysis:  
Blood Endocrine Laboratory Tests: Value  
Blood glucose level by fingerstick Non-fasting 122 mg/dl  
Laboratory-based Chemistry:  
Other Laboratory Tests:  
Screening for sexually transmitted infections was not performed.  
Imaging:  
Ophthalmoscopy:  
No apparent diabetic retinopathy.  
Optomap completed Both eyes (OU) and with physician / healthcare professional   
interpretation and report.  
  
** Assessment **  
- Z68.43 - Body mass index [BMI] 50.0-59.9, adult  
- Z23 - Encounter for immunization  
- K02.9 - Dental caries, unspecified  
- E11.9 - Type 2 diabetes mellitus without complications  
- F31.31 - Bipolar disorder, current episode depressed, mild  
  
** Previous Tests **  
Laboratory Studies:  
Renal Function:  
Glomerular filtration rate 2024 >60.  
  
** Therapy **  
- Patient has agreed to receive flu vaccine today.  
- Silver diamine fluoride 38% application by physician or other QHP 10 Teeth.  
- Immunization administration, by injection, one vaccine.  
  
** Vaccinations **  
- Fluarix 0.5mL for 6 months and older Dose #2 Status: Administered Date: 
2024  
  
- Received dose of Fluarix 0.5mL (6 months and up)  
  
** Counseling/Education **  
- Wishing to stop using electronic cigarettes/vaping  
- Discussed nutritional needs teach healthy choices including fruits and 
vegetables  
- Patient education about a proper diet  
- Discussed concerns about exercise: promote physical activity  
  
** Plan **  
StartCited- Attention-deficit hyperactivity disorder, unspecified type  
Intuniv 1 MG tablet take 1 tablet by mouth once daily at bedtime, 30 days, 5 
refills  
EndCited  
StartCited- Other  
Follow-up Appointment  
1 month med check  
lamoTRIgine 100 MG tablet take 1 tablet by mouth once daily, 30 days, 5 refills  
Prazosin HCl 1 MG capsule take 1 capsule by mouth twice daily, 30 days, 5 
refills  
Sertraline HCl 50 MG tablet take 1 tablet by mouth once daily, 30 days, 5 
refills  
EndCited  
** Care Team **  
- Doreen Cabrera CNP  
  
** Health Reminders **  
- Assess BMI satisfied 2024.  
- Assess Tobacco Use satisfied 2024.  
- Eye Exam satisfied 10/09/2024.  
- Follow Up Plan BMI Management satisfied 2024.  
- Foot Exam satisfied 2024.  
  
** User Defined 1 **  
Not planning a pregnancy in the next year.  
  
** Bottom of Document **  
Illustration  
  
  
[facility]09- Reason for referral (narrative)*   
  
                          Date         Encounter Description Provider     Reason  
 for Referral  
   
                          24      Established Patient Radha Hector LSW R  
eferral To Mental Health Team  
   
                          22      Established Patient Haylie Jeff MOYA  
CC-S Referral To Mental Health  
 Team  
   
                          21     Medical New Patient Doreen Cabrera CNP Refe  
rral To Mental Health Team  
  
  
[facility]  
Work Phone: [phone]- Evaluation note  
  
Includes: Assessments for all patient encounters  
  
  
  
                                Findings        Encounter       Date  
   
                                                    Attention-deficit hyperactiv  
ity   
disorder                                 Established Patient with Leonie   
Short LISWS                             2024  
  
  
  
                                                    Last Documented On   
4 6:28PM ; [facility]  
  
  
  
                                                    Bipolar I disorder, most rec  
ent   
episode, depressed - mild                Established Patient with Leonie   
Short LISWS                             2024  
  
  
  
                                                    Last Documented On   
4 6:28PM ; [facility]  
  
  
  
                                        Post-traumatic stress disorder  Establ  
ished Patient with Leonie Short   
LISWS                                   2024  
  
  
  
                                                    Last Documented On   
4 6:28PM ; [facility]  
  
  
  
                                                    [E11.9 - Type 2 diabetes lobito  
litus   
without complications] type 2 diabetes   
mellitus                                Medical Established Patient with   
Doreen Cabrera CNP                        2024  
  
  
  
                                                    Last Documented On   
4 7:36PM ; [facility]  
  
  
  
                                                    [F41.1 - Generalized anxiety  
   
disorder] generalized anxiety   
disorder                                Medical Established Patient with   
Doreen Cabrera CNP                        2024  
  
  
  
                                                    Last Documented On   
4 7:36PM ; [facility]  
  
  
  
                                                    [I10 - Essential (primary)   
hypertension] essential hypertension    Medical Established Patient with   
Doreen Cabrera CNP                        2024  
  
  
  
                                                    Last Documented On   
4 7:36PM ; [facility]  
  
  
  
                                                    [N94.6 - Dysmenorrhea, unspe  
cified]   
dysmenorrhea                            Medical Established Patient with   
Doreen Cabrera CNP                        2024  
  
  
  
                                                    Last Documented On   
4 7:36PM ; [facility]  
  
  
  
                                                    [Z68.43 - Body mass index [B  
MI]   
50.0-59.9, adult] assessment of body   
mass index                              Medical Established Patient with   
Doreen Cabrera CNP                        2024  
  
  
  
                                                    Last Documented On   
4 7:36PM ; [facility]  
  
  
  
                                        Encounter for Immunization Medical Estab  
lished Patient with Doreen Cabrera   
CNP                                     2024  
  
  
  
                                                    Last Documented On   
4 7:36PM ; [facility]  
  
  
  
                                        Venipuncture was performed Medical Estab  
lished Patient with Doreen Cabrera   
CNP                                     2024  
  
  
  
                                                    Last Documented On   
4 7:36PM ; [facility]  
  
  
  
                                        Attention-deficit hyperactivity disorder  
  Established Patient with   
Leonie Short LISWS                     2024  
  
  
  
                                                    Last Documented On   
4 10:10AM ; [facility]  
  
  
  
                                                    Bipolar I disorder, most rec  
ent   
episode, depressed - mild                Established Patient with Leonie   
Short LISWS                             2024  
  
  
  
                                                    Last Documented On   
4 10:10AM ; [facility]  
  
  
  
                                        Post-traumatic stress disorder  Establ  
ished Patient with Leonie Short   
LISWS                                   2024  
  
  
  
                                                    Last Documented On   
4 10:10AM ; [facility]  
  
  
  
                                        [D64.9 - Anemia, unspecified] anemia Med  
ical Established Patient with   
Doreen Cabrera CNP                        2024  
  
  
  
                                                    Last Documented On   
4 6:51PM ; [facility]  
  
  
  
                                                    [Z68.43 - Body mass index [B  
MI]   
50.0-59.9, adult] assessment of body   
mass index                              Medical Established Patient with   
Doreen Cabrera CNP                        2024  
  
  
  
                                                    Last Documented On   
4 6:51PM ; [facility]  
  
  
  
                                                    Bipolar affective disorder,   
current   
episode depressed, mild                  Established Patient with Leonie   
Short LISWS                             2024  
  
  
  
                                                    Last Documented On   
4 5:16PM ; [facility]  
  
  
  
                                        Post-traumatic stress disorder  Establ  
ished Patient with Leonie Short   
LISWS                                   2024  
  
  
  
                                                    Last Documented On   
4 5:16PM ; [facility]  
  
  
  
                                                    Undifferentiated attention d  
eficit   
disorder                                BH Established Patient with   
Leonie Garrett LISWS                     2024  
  
  
  
                                                    Last Documented On   
4 5:16PM ; [facility]  
  
  
  
                                        Visit for: screening for disorder BH Est  
ablished Patient with Leonie Garrett LISWS                             2024  
  
  
  
                                                    Last Documented On   
4 5:16PM ; [facility]  
  
  
  
                                                    [Z68.43 - Body mass index [B  
MI]   
50.0-59.9, adult] assessment of body   
mass index                              Medical Established Patient with   
Doreen Cabrera CNP                        2024  
  
  
  
                                                    Last Documented On   
4 7:50PM ; [facility]  
  
  
  
                                        Attention-deficit hyperactivity disorder  
 Medical Established Patient with   
Doreen Cabrera CNP                        2024  
  
  
  
                                                    Last Documented On   
4 7:50PM ; [facility]  
  
  
  
                                                    Diabetes Risk Test Score was  
 three   
score 3/27/2024                         Medical Established Patient with Doreen Cabrera CNP                              2024  
  
  
  
                                                    Last Documented On   
4 7:50PM ; [facility]  
  
  
  
                                        Visit for: screening for STD Medical Est  
ablished Patient with Doreen Cabrera   
CNP                                     2024  
  
  
  
                                                    Last Documented On   
4 7:50PM ; [facility]  
  
  
  
                                                    [Z68.32 - Body mass index [B  
MI]   
32.0-32.9, adult] assessment of body   
mass index                              Medical Established Patient with   
Doreen Cabrera CNP                        2023  
  
  
  
                                                    Last Documented On   
3 6:01PM ; [facility]  
  
  
  
                                        Borderline personality disorder Medical   
Established Patient with Doreen Cabrera CNP                              2023  
  
  
  
                                                    Last Documented On   
3 6:01PM ; [facility]  
  
  
  
                                        Screening for diabetes mellitus Medical   
Established Patient with Doreen Cabrera CNP                              2023  
  
  
  
                                                    Last Documented On   
3 6:01PM ; [facility]  
  
  
  
                                        Assessment of body mass index Medical Es  
tablished Patient with Doreen Cabrera CNP                              10/21/2022  
  
  
  
                                                    Last Documented On 10/21/202  
2 2:45PM ; [facility]  
  
  
  
                                                    Bipolar affective disorder,   
current   
episode manic                            Telebehavioral Health with Haylie Aguilar [facility]                        2022  
  
  
  
                                                    Last Documented On   
2 3:22PM ; [facility]  
  
  
  
                                                    Bipolar affective disorder,   
current   
episode manic                            Established Patient with Haylienicanor Aguilar [facility]                        2022  
  
  
  
                                                    Last Documented On   
2 2:28PM ; [facility]  
  
  
  
                                                    Bipolar affective disorder,   
current   
episode depressed, severe with   
psychosis                                Telebehavioral Health with Haylienicanor Aguilar [facility]                        2022  
  
  
  
                                                    Last Documented On   
2 3:29PM ; [facility]  
  
  
  
                                                    Assessment of body mass inde  
x [Body   
mass index [BMI] 50.0-59.9, adult]      Open [facility] - Established with Julieth   
Aliya CNP                               2022  
  
  
  
                                                    Last Documented On   
2 9:36AM ; [facility]  
  
  
  
                                                    Bipolar I disorder, most rec  
ent   
episode, manic                          Open Access - Established with Julieth   
Aliya CNP                               2022  
  
  
  
                                                    Last Documented On   
2 9:36AM ; [facility]  
  
  
  
                                        Post-traumatic stress disorder  Establ  
ished Patient with Haylienicanor Aguilar   
[facility]                                  2022  
  
  
  
                                                    Last Documented On   
2 3:20PM ; [facility]  
  
  
  
                                No cough        Medical Established Patient with  
 Doreen Deborah CNP 2022  
  
  
  
                                                    Last Documented On   
2 4:04PM ; [facility]  
  
  
  
                                                    Z68.43 - Body mass index [BM  
I]   
50.0-59.9, adult                        Medical Established Patient with   
Doreen Deborah CNP                        2022  
  
  
  
                                                    Last Documented On   
2 4:04PM ; [facility]  
  
  
  
                                                    Borderline personality disor  
danny Pt   
reported hx of sx/dx                     Established Patient with Haylienicanor Aguilar [facility]                        2022  
  
  
  
                                                    Last Documented On   
2 4:08PM ; [facility]  
  
  
  
                                                    Assessment of body mass inde  
x [Body   
mass index [BMI] 50.0-59.9, adult]      [facility] - Established with Julieth   
Aliya CNP                               2022  
  
  
  
                                                    Last Documented On   
2 7:41PM ; [facility]  
  
  
  
                                                    Diabetes Risk Test Score was  
 three   
score 2022                         Open Access - Established with Julieth   
Aliya CNP                               2022  
  
  
  
                                                    Last Documented On   
2 7:41PM ; [facility]  
  
  
  
                                                    Bipolar I disorder, most rec  
ent   
episode, manic                           Established Patient with Avis   
Felder [facility]                          2021  
  
  
  
                                                    Last Documented On   
1 1:35AM ; [facility]  
  
  
  
                                        Borderline personality disorder  Estab  
lished Patient with Avis   
Felder [facility]S                          2021  
  
  
  
                                                    Last Documented On   
1 1:35AM ; [facility]  
  
  
  
                                        Post-traumatic stress disorder  Establ  
ished Patient with Avis   
Felder [facility]                          2021  
  
  
  
                                                    Last Documented On   
1 1:35AM ; [facility]  
  
  
  
                                                    Assessment of visit for: bhargavi myles for   
human immunodeficiency virus            Medical Established Patient with   
Doreen Deborah CNP                        2021  
  
  
  
                                                    Last Documented On   
1 2:56PM ; [facility]  
  
  
  
                                Nicotine dependence Medical Established Patient   
with Doreen Deborah CNP 2021  
  
  
  
                                                    Last Documented On   
1 2:56PM ; [facility]  
  
  
  
                                Tachycardia     Medical Established Patient with  
 Doreen Deborah CNP 2021  
  
  
  
                                                    Last Documented On   
1 2:56PM ; [facility]  
  
  
  
                                                    Z68.43 - Body mass index [BM  
I]   
50.0-59.9, adult                        Medical Established Patient with   
Doreen Deborah CNP                        2021  
  
  
  
                                                    Last Documented On   
1 2:56PM ; [facility]  
  
  
  
                                                    Bipolar I disorder, most rec  
ent   
episode, manic                           Established Patient with Leonie   
Short LISWS                             2021  
  
  
  
                                                    Last Documented On   
1 10:17AM ; [facility]  
  
  
  
                                                    Borderline personality disor  
danny per   
patient reported history                 Established Patient with Leonie   
Short LISWS                             2021  
  
  
  
                                                    Last Documented On   
1 10:17AM ; [facility]  
  
  
  
                                Nicotine dependence  Established Patient with   
Leonie Short LISWS 2021  
  
  
  
                                                    Last Documented On   
1 10:17AM ; [facility]  
  
  
  
                                        Post-traumatic stress disorder  Establ  
ished Patient with Leonie Short   
LISWS                                   2021  
  
  
  
                                                    Last Documented On   
1 10:17AM ; [facility]  
  
  
  
                                Body mass index Medical Established Patient with  
 Doreen Deborah CNP 2021  
  
  
  
                                                    Last Documented On   
1 5:23PM ; [facility]  
  
  
  
                                Morbid obesity  Medical Established Patient with  
 Doreen Deborah CNP 2021  
  
  
  
                                                    Last Documented On   
1 5:23PM ; [facility]  
  
  
  
                                        Nicotine dependence uncomplicated Medica  
l Established Patient with Doreen   
Deborah CNP                              2021  
  
  
  
                                                    Last Documented On   
1 5:23PM ; [facility]  
  
  
  
                                                    Z68.42 - Body mass index [BM  
I]   
45.0-49.9, adult                        Medical Established Patient with   
Doreen Deborah CNP                        2021  
  
  
  
                                                    Last Documented On   
1 5:23PM ; [facility]  
  
  
  
                                Episodic mood disorders On Call with Pamela edwards CNP 2021  
  
  
  
                                                    Last Documented On   
1 7:49AM ; [facility]  
  
  
  
                                                    Mood disorders, NOS per tabatha  
ent   
reported history                         Established Patient with Leonie   
Short LISWS                             2021  
  
  
  
                                                    Last Documented On   
1 7:15PM ; [facility]  
  
  
  
                                        Post-traumatic stress disorder  Establ  
ished Patient with Leonie Short   
LISWS                                   2021  
  
  
  
                                                    Last Documented On   
1 7:15PM ; [facility]  
  
  
  
                                Morbid obesity  Medical Established Patient with  
 Doreen Deborah CNP 2021  
  
  
  
                                                    Last Documented On   
1 12:31PM ; [facility]  
  
  
  
                                Otitis externa  Medical Established Patient with  
 Doreen Deborah CNP 2021  
  
  
  
                                                    Last Documented On   
1 12:31PM ; [facility]  
  
  
  
                                                    Z68.42 - Body mass index [BM  
I]   
45.0-49.9, adult                        Medical Established Patient with   
Doreen Deborah CNP                        2021  
  
  
  
                                                    Last Documented On   
1 12:31PM ; [facility]  
  
  
  
                                        Post-traumatic stress disorder BH Establ  
ished Patient with Leonie Short   
LISWS                                   06/10/2021  
  
  
  
                                                    Last Documented On 06/10/202  
1 10:05PM ; [facility]  
  
  
  
                                Morbid obesity  Medical Established Patient with  
 Doreen Deborah CNP 06/10/2021  
  
  
  
                                                    Last Documented On 06/10/202  
1 4:45PM ; [facility]  
  
  
  
                                                    Z68.42 - Body mass index [BM  
I]   
45.0-49.9, adult                        Medical Established Patient with   
Doreen Deborah CNP                        06/10/2021  
  
  
  
                                                    Last Documented On 06/10/202  
1 4:45PM ; [facility]  
  
  
  
                                        Post-traumatic stress disorder  Establ  
ished Patient with Leonie Short   
LISWS                                   2021  
  
  
  
                                                    Last Documented On   
1 11:59AM ; [facility]  
  
  
  
                                                    Assessment of visit for: scr  
eening for   
human immunodeficiency virus            Medical New Patient with Doreen   
Deborah CNP                              2021  
  
  
  
                                                    Last Documented On   
1 4:06PM ; [facility]  
  
  
  
                                                    Diabetes Risk Test Score was  
 one score   
2021                               Medical New Patient with Doreenpaola Cabrera CNP                              2021  
  
  
  
                                                    Last Documented On   
1 4:06PM ; [facility]  
  
  
  
                                Hypertension    Medical New Patient with Doreenpaola esposito CNP 2021  
  
  
  
                                                    Last Documented On   
1 4:06PM ; [facility]  
  
  
  
                                Morbid obesity  Medical New Patient with Doreen DAVID almonteen CNP 2021  
  
  
  
                                                    Last Documented On   
1 4:06PM ; [facility]  
  
  
  
                                Post-traumatic stress disorder Medical New Patie  
nt with Doreenpaola Cabrera CNP   
2021  
  
  
  
                                                    Last Documented On   
1 4:06PM ; [facility]  
  
  
  
                                                    Z68.42 - Body mass index [BM  
I]   
45.0-49.9, adult                        Medical New Patient with Doreenpaola Cabrera CNP                              2021  
  
  
  
                                                    Last Documented On   
1 4:06PM ; [facility]  
Work Phone: [phone]- Evaluation note  
  
Includes: Assessments for all patient encounters  
  
  
  
                                Findings        Encounter       Date  
   
                                                    Attention-deficit hyperactiv  
ity   
disorder                                 Established Patient with Leonie   
Short LISWS                             2024  
  
  
  
                                                    Last Documented On   
4 3:28PM ; [facility]  
  
  
  
                                                    Bipolar I disorder, most rec  
ent   
episode, depressed - mild                Established Patient with Leonie   
Short LISWS                             2024  
  
  
  
                                                    Last Documented On   
4 3:28PM ; [facility]  
  
  
  
                                        Post-traumatic stress disorder  Establ  
ished Patient with Leonie Short   
LISWS                                   2024  
  
  
  
                                                    Last Documented On   
4 3:28PM ; [facility]  
  
  
  
                                                    [E11.9 - Type 2 diabetes lobito  
litus   
without complications] type 2 diabetes   
mellitus                                Medical Established Patient with   
Doreen Cabrera CNP                        2024  
  
  
  
                                                    Last Documented On   
4 7:36PM ; [facility]  
  
  
  
                                                    [F41.1 - Generalized anxiety  
   
disorder] generalized anxiety   
disorder                                Medical Established Patient with   
Doreen Cabrera CNP                        2024  
  
  
  
                                                    Last Documented On   
4 7:36PM ; [facility]  
  
  
  
                                                    [I10 - Essential (primary)   
hypertension] essential hypertension    Medical Established Patient with   
Doreen Cabrera CNP                        2024  
  
  
  
                                                    Last Documented On   
4 7:36PM ; [facility]  
  
  
  
                                                    [N94.6 - Dysmenorrhea, unspe  
cified]   
dysmenorrhea                            Medical Established Patient with   
Doreen Cabrera CNP                        2024  
  
  
  
                                                    Last Documented On   
4 7:36PM ; [facility]  
  
  
  
                                                    [Z68.43 - Body mass index [B  
MI]   
50.0-59.9, adult] assessment of body   
mass index                              Medical Established Patient with   
Doreen Cabrera CNP                        2024  
  
  
  
                                                    Last Documented On   
4 7:36PM ; [facility]  
  
  
  
                                        Encounter for Immunization Medical Estab  
lished Patient with Doreen Cabrera   
CNP                                     2024  
  
  
  
                                                    Last Documented On   
4 7:36PM ; [facility]  
  
  
  
                                        Venipuncture was performed Medical Estab  
lished Patient with Doreen Cabrera   
CNP                                     2024  
  
  
  
                                                    Last Documented On   
4 7:36PM ; [facility]  
  
  
  
                                        Attention-deficit hyperactivity disorder  
  Established Patient with   
Leonie Short LISWS                     2024  
  
  
  
                                                    Last Documented On   
4 10:10AM ; [facility]  
  
  
  
                                                    Bipolar I disorder, most rec  
ent   
episode, depressed - mild                Established Patient with Leonie   
Short LISWS                             2024  
  
  
  
                                                    Last Documented On   
4 10:10AM ; [facility]  
  
  
  
                                        Post-traumatic stress disorder  Establ  
ished Patient with Leonie Short   
LISWS                                   2024  
  
  
  
                                                    Last Documented On   
4 10:10AM ; [facility]  
  
  
  
                                        [D64.9 - Anemia, unspecified] anemia Med  
ical Established Patient with   
Doreen Cabrera CNP                        2024  
  
  
  
                                                    Last Documented On   
4 6:51PM ; [facility]  
  
  
  
                                                    [Z68.43 - Body mass index [B  
MI]   
50.0-59.9, adult] assessment of body   
mass index                              Medical Established Patient with   
Doreen Cabrera CNP                        2024  
  
  
  
                                                    Last Documented On   
4 6:51PM ; [facility]  
  
  
  
                                                    Bipolar affective disorder,   
current   
episode depressed, mild                  Established Patient with Leonie Garrett LISWS                             2024  
  
  
  
                                                    Last Documented On   
4 5:16PM ; [facility]  
  
  
  
                                        Post-traumatic stress disorder  Establ  
ished Patient with Leonie Short   
LISWS                                   2024  
  
  
  
                                                    Last Documented On   
4 5:16PM ; [facility]  
  
  
  
                                                    Undifferentiated attention d  
eficit   
disorder                                 Established Patient with   
Leonie Short LISWS                     2024  
  
  
  
                                                    Last Documented On   
4 5:16PM ; [facility]  
  
  
  
                                        Visit for: screening for disorder BH Est  
ablished Patient with Leonie Garrett LISWS                             2024  
  
  
  
                                                    Last Documented On   
4 5:16PM ; [facility]  
  
  
  
                                                    [Z68.43 - Body mass index [B  
MI]   
50.0-59.9, adult] assessment of body   
mass index                              Medical Established Patient with   
Doreen Cabrera CNP                        2024  
  
  
  
                                                    Last Documented On   
4 7:50PM ; [facility]  
  
  
  
                                        Attention-deficit hyperactivity disorder  
 Medical Established Patient with   
Doreen Cabrera CNP                        2024  
  
  
  
                                                    Last Documented On   
4 7:50PM ; [facility]  
  
  
  
                                                    Diabetes Risk Test Score was  
 three   
score 3/27/2024                         Medical Established Patient with Doreen Cabrera CNP                              2024  
  
  
  
                                                    Last Documented On   
4 7:50PM ; [facility]  
  
  
  
                                        Visit for: screening for STD Medical Est  
ablished Patient with Doreen Cabrera   
CNP                                     2024  
  
  
  
                                                    Last Documented On   
4 7:50PM ; [facility]  
  
  
  
                                                    [Z68.32 - Body mass index [B  
MI]   
32.0-32.9, adult] assessment of body   
mass index                              Medical Established Patient with   
Doreen Cabrera CNP                        2023  
  
  
  
                                                    Last Documented On   
3 6:01PM ; [facility]  
  
  
  
                                        Borderline personality disorder Medical   
Established Patient with Doreen Cabrera CNP                              2023  
  
  
  
                                                    Last Documented On   
3 6:01PM ; [facility]  
  
  
  
                                        Screening for diabetes mellitus Medical   
Established Patient with Doreenpaola Cabrera CNP                              2023  
  
  
  
                                                    Last Documented On   
3 6:01PM ; [facility]  
  
  
  
                                        Assessment of body mass index Medical Es  
tablished Patient with Doreenpaola Cabrera CNP                              10/21/2022  
  
  
  
                                                    Last Documented On 10/21/202  
2 2:45PM ; [facility]  
  
  
  
                                                    Bipolar affective disorder,   
current   
episode manic                            [facility] with Haylie   
Aguilar LP[facility]-S                        2022  
  
  
  
                                                    Last Documented On   
2 3:22PM ; [facility]  
  
  
  
                                                    Bipolar affective disorder,   
current   
episode manic                            Established Patient with Haylie   
Aguilar LPCC-S                        2022  
  
  
  
                                                    Last Documented On   
2 2:28PM ; [facility]  
  
  
  
                                                    Bipolar affective disorder,   
current   
episode depressed, severe with   
psychosis                                [facility] with Haylie   
Aguilar LPCC-S                        2022  
  
  
  
                                                    Last Documented On   
2 3:29PM ; [facility]  
  
  
  
                                                    Assessment of body mass inde  
x [Body   
mass index [BMI] 50.0-59.9, adult]      [facility] - Established with Julieth   
Aliya CNP                               2022  
  
  
  
                                                    Last Documented On   
2 9:36AM ; [facility]  
  
  
  
                                                    Bipolar I disorder, most rec  
ent   
episode, manic                          [facility] - Established with Julieth   
Aliya CNP                               2022  
  
  
  
                                                    Last Documented On   
2 9:36AM ; [facility]  
  
  
  
                                        Post-traumatic stress disorder BH Establ  
ished Patient with Haylie Aguilar   
LPCC-S                                  2022  
  
  
  
                                                    Last Documented On   
2 3:20PM ; [facility]  
  
  
  
                                No cough        Medical Established Patient with  
 Doreenpaola Cabrera CNP 2022  
  
  
  
                                                    Last Documented On   
2 4:04PM ; [facility]  
  
  
  
                                                    Z68.43 - Body mass index [BM  
I]   
50.0-59.9, adult                        Medical Established Patient with   
Doreen Deborah CNP                        2022  
  
  
  
                                                    Last Documented On   
2 4:04PM ; [facility]  
  
  
  
                                                    Borderline personality disor  
danny Pt   
reported hx of sx/dx                     Established Patient with Haylie Aguilar LPCC-S                        2022  
  
  
  
                                                    Last Documented On   
2 4:08PM ; [facility]  
  
  
  
                                                    Assessment of body mass inde  
x [Body   
mass index [BMI] 50.0-59.9, adult]      [facility] - Established with Julieth   
Aliya CNP                               2022  
  
  
  
                                                    Last Documented On   
2 7:41PM ; [facility]  
  
  
  
                                                    Diabetes Risk Test Score was  
 three   
score 2022                         Open Access - Established with Julieth   
Aliya CNP                               2022  
  
  
  
                                                    Last Documented On   
2 7:41PM ; [facility]  
  
  
  
                                                    Bipolar I disorder, most rec  
ent   
episode, manic                           [facility]  
  
  
  
                                                    Last Documented On   
1 1:35AM ; [facility]  
  
  
  
                                        Borderline personality disorder  Estab  
lished Patient [facility]   
[facility]                          [facility]  
  
  
  
                                                    Last Documented On   
1 1:35AM ; [facility]  
  
  
  
                                        Post-traumatic stress disorder  Establ  
ished Patient with Avis   
Felder LPCC-S                          2021  
  
  
  
                                                    Last Documented On   
1 1:35AM ; [facility]  
  
  
  
                                                    Assessment of visit for: bhargavi guevaraning for   
human immunodeficiency virus            Medical Established Patient with   
Doreen Deborah CNP                        2021  
  
  
  
                                                    Last Documented On   
1 2:56PM ; [facility]  
  
  
  
                                Nicotine dependence Medical Established Patient   
with Doreen Deborah CNP 2021  
  
  
  
                                                    Last Documented On   
1 2:56PM ; [facility]  
  
  
  
                                Tachycardia     Medical Established Patient with  
 Doreen Deborah CNP 2021  
  
  
  
                                                    Last Documented On   
1 2:56PM ; [facility]  
  
  
  
                                                    Z68.43 - Body mass index [BM  
I]   
50.0-59.9, adult                        Medical Established Patient with   
Doreen Deborah CNP                        2021  
  
  
  
                                                    Last Documented On   
1 2:56PM ; [facility]  
  
  
  
                                                    Bipolar I disorder, most rec  
ent   
episode, manic                           Established Patient with Leonie   
Short LISWS                             2021  
  
  
  
                                                    Last Documented On   
1 10:17AM ; [facility]  
  
  
  
                                                    Borderline personality disor  
danny per   
patient reported history                 Established Patient with Leonie   
Short LISWS                             2021  
  
  
  
                                                    Last Documented On   
1 10:17AM ; [facility]  
  
  
  
                                Nicotine dependence BH Established Patient with   
Leonie Short LISWS 2021  
  
  
  
                                                    Last Documented On   
1 10:17AM ; [facility]  
  
  
  
                                        Post-traumatic stress disorder  Establ  
ished Patient with Leonie Short   
LISWS                                   2021  
  
  
  
                                                    Last Documented On   
1 10:17AM ; [facility]  
  
  
  
                                Body mass index Medical Established Patient with  
 Doreen Deborah CNP 2021  
  
  
  
                                                    Last Documented On   
1 5:23PM ; [facility]  
  
  
  
                                Morbid obesity  Medical Established Patient with  
 Doreen Deborah CNP 2021  
  
  
  
                                                    Last Documented On   
1 5:23PM ; [facility]  
  
  
  
                                        Nicotine dependence uncomplicated Medica  
l Established Patient with Doreen   
Deborah CNP                              2021  
  
  
  
                                                    Last Documented On   
1 5:23PM ; [facility]  
  
  
  
                                                    Z68.42 - Body mass index [BM  
I]   
45.0-49.9, adult                        Medical Established Patient with   
Doreen Deborah CNP                        2021  
  
  
  
                                                    Last Documented On   
1 5:23PM ; [facility]  
  
  
  
                                Episodic mood disorders On Call with Pamela edwards CNP 2021  
  
  
  
                                                    Last Documented On   
1 7:49AM ; [facility]  
  
  
  
                                                    Mood disorders, NOS per tabatha  
ent   
reported history                         Established Patient with Leonie   
Short LISWS                             2021  
  
  
  
                                                    Last Documented On   
1 7:15PM ; [facility]  
  
  
  
                                        Post-traumatic stress disorder  Establ  
ished Patient with Leonie Short   
LISWS                                   2021  
  
  
  
                                                    Last Documented On   
1 7:15PM ; [facility]  
  
  
  
                                Morbid obesity  Medical Established Patient with  
 Doreen Deborah CNP 2021  
  
  
  
                                                    Last Documented On   
1 12:31PM ; [facility]  
  
  
  
                                Otitis externa  Medical Established Patient with  
 Doreen Deborah CNP 2021  
  
  
  
                                                    Last Documented On   
1 12:31PM ; [facility]  
  
  
  
                                                    Z68.42 - Body mass index [BM  
I]   
45.0-49.9, adult                        Medical Established Patient with   
Doreen Deborah CNP                        2021  
  
  
  
                                                    Last Documented On   
1 12:31PM ; [facility]  
  
  
  
                                        Post-traumatic stress disorder  Establ  
ished Patient with Leonie Short   
LISWS                                   06/10/2021  
  
  
  
                                                    Last Documented On 06/10/202  
1 10:05PM ; [facility]  
  
  
  
                                Morbid obesity  Medical Established Patient with  
 Doreen Deborah CNP 06/10/2021  
  
  
  
                                                    Last Documented On 06/10/202  
1 4:45PM ; [facility]  
  
  
  
                                                    Z68.42 - Body mass index [BM  
I]   
45.0-49.9, adult                        Medical Established Patient with   
Doreen Deborah CNP                        06/10/2021  
  
  
  
                                                    Last Documented On 06/10/202  
1 4:45PM ; [facility]  
  
  
  
                                        Post-traumatic stress disorder  Establ  
ished Patient with Leonie Short   
LISWS                                   2021  
  
  
  
                                                    Last Documented On   
1 11:59AM ; [facility]  
  
  
  
                                                    Assessment of visit for: scr  
eening for   
human immunodeficiency virus            Medical New Patient with Doreen   
Deborah CNP                              2021  
  
  
  
                                                    Last Documented On   
1 4:06PM ; [facility]  
  
  
  
                                                    Diabetes Risk Test Score was  
 one score   
2021                               Medical New Patient with Doreen   
Deborah CNP                              2021  
  
  
  
                                                    Last Documented On   
1 4:06PM ; [facility]  
  
  
  
                                Hypertension    Medical New Patient with Doreen C  
cate CNP 2021  
  
  
  
                                                    Last Documented On   
1 4:06PM ; [facility]  
  
  
  
                                Morbid obesity  Medical New Patient with Doreen C  
cate CNP 2021  
  
  
  
                                                    Last Documented On   
1 4:06PM ; [facility]  
  
  
  
                                Post-traumatic stress disorder Medical New Patie  
nt with Doreen Deborah CNP   
2021  
  
  
  
                                                    Last Documented On   
1 4:06PM ; [facility]  
  
  
  
                                                    Z68.42 - Body mass index [BM  
I]   
45.0-49.9, adult                        Medical New Patient with Doreen   
Deborah CNP                              2021  
  
  
  
                                                    Last Documented On   
1 4:06PM ; [facility]  
Work Phone: [phone]- Progress note*   
  
Progress note  
  
  
  
                                Date            Encounter       Last Documented   
by  
   
                                2024      Medical Established Patient Last  
 documented on 2024; 7:36 PM,   
Doreen Cabrera CNP; [facility]  
  
  
  
                                                      
  
  
** Active Problems & Conditions **  
- F90.9 - Attention-deficit Hyperactivity Disorder  
- F31.31 - Bipolar I Disorder, Most Recent Episode, Depressed Mild  
- E11.9 - Diabetes Mellitus Type 2 Without Complication  
- N94.6 - Dysmenorrhea  
- F43.10 - Post-traumatic Stress Disorder  
  
** Chief Complaint **  
The Chief Complaint is: F/u on mental health. Patient stopped metformin and has   
gained weight. Patient has appointment scheduled with Dr. Patterson. Patient has 
been   
menstruating since stopping the metformin 4-5 months. Does not think buspirone 
is   
working because of increased anxiety.  
Patient did not get blood work completed.  
  
** Referred Here **  
Not referred by urgent care clinic and not the emergency room. No prior 
encounters.  
- Data to be reviewed: no clinical lab tests  
  
** History of Present Illness **  
Linnette Beckham is a 24 year old female.  
- Allergy list reviewed - Reviewed Medications - Medication list reviewed  
- Date of last menstruation patient unsure of date - Pregnancy test - would not 
like   
a pregnancy test  
Presents with sig other , anxiety is  not good  and has been bleeding ever since
   
stopping metformin r/t intolerance 3 months ago , has appt with gyn but not til   
october agreeable to progesterone ocp to help get bleeding to stop  
  
** Current Medication **  
- ARIPiprazole 5 MG Oral Tablet take 1 tablet by mouth once daily, 30 days, 5 
refills  
- Intuniv 1 MG Oral Tablet Extended Release 24 Hour take 1 tablet by mouth once 
daily   
at bedtime, 30 days, 5 refills  
- lamoTRIgine 100 MG Oral Tablet take 1 tablet by mouth once daily, 30 days, 5   
refills  
- MiraLax 17 GM/SCOOP Oral Powder mix 1 scoop with 8 ounces once daily, 30 days,
 2   
refills  
- Narcan 4 MG/0.1ML Nasal Liquid spray in nostril for symptoms of overdose , may
   
repeat in 2 minutes if symptoms persist, 1 days, 0 refills  
- Prazosin HCl 1 MG Oral Capsule take 1 capsule by mouth twice daily, 30 days, 5
   
refills  
- Sertraline HCl 50 MG Oral Tablet take 1 tablet by mouth once daily, 30 days, 5
   
refills  
- traZODone HCl 100 MG Oral Tablet take 1 tablet by mouth twice daily, 30 days, 
5   
refills  
  
** Past Medical/Surgical History **  
Reported:  
Has sex without a condom.  
Medical: No previous hospitalizations. Chronic illness and Sexually transmitted   
infection Partners sexually transmitted infection status known.  
Immunization History: Recent immunization for flu.  
Exposure: Exposure to COVID-19.  
Pregnancy: Previously pregnant 1 time(s) and para having 0 live birth(s). Not   
planning a pregnancy in the next year.  
Diagnoses:  
Polycystic Ovarian Syndrome (PCOS).  
Migraine headache.  
Psychiatric disorders biopolar disorder  
Anxiety disorder  
POTS.  
Procedural:  
- Insertion of ear pressure equalization tubes in both ears  
Surgical:  
- Tonsillectomy  
- Tonsillectomy with adenoidectomy  
  
** Social History **  
Environmental Exposure: Secondhand cigarette smoke exposure.  
Behavioral: Not a current tobacco user.  
Tobacco use: Using electronic cigarettes/vaping.  
Alcohol: A social drinker.  
Drug Use: Not using drugs denied by patient.  
Sexual: Sexually active age of sexual partner is _____ years old 21, sexual   
orientation Lesbian or David, gender identity Other, and birth control is being   
practiced Not Using - In Same Sex Relationship.  
  
** Allergies **  
- Abilify Reaction: groggy  
- Augmentin Reaction: Shock  
- Metformin Reaction: Nausea, Vomiting  
- NO KNOWN ENVIRONMENTAL ALLERGIES  
- NO KNOWN FOOD ALLERGIES  
- Sertraline Reaction: slow/groggy  
- Trazodone Hydrochloride Reaction: Panic attack  
  
** Family History **  
Paternal:  
Systemic hypertension  
Oncologic disorder  
Maternal:  
Systemic hypertension  
Epilepsy and recurrent seizures  
Psychiatric disorders  
Oncologic disorder  
Fraternal:  
Psychiatric disorders  
  
** Review Of Systems **  
Head: No head symptoms.  
Neck: No neck symptoms.  
Eyes: No eye symptoms.  
Otolaryngeal: No ear symptoms, no nasal symptoms, no nose and sinus finding, no   
throat symptoms, no oral cavity symptoms, and no jaw symptoms.  
Breasts: No breast symptoms.  
Cardiovascular: No cardiovascular symptoms and no chest pain or discomfort.  
Pulmonary: No pulmonary symptoms.  
Gastrointestinal: No gastrointestinal symptoms.  
Genitourinary: No genitourinary symptoms. Vaginal discharge has been on period x
 3   
months , not heavy and no clotting.  
Musculoskeletal: No musculoskeletal symptoms.  
Neurological: No neurological symptoms.  
Psychological: Anxiety, depression, and sleep.  
Skin: No skin symptoms.  
Cardiovascular: Edema not present.  
  
** Physical Findings **  
- Vitals taken 2024 04:45 pm  
BP-Sitting R151/98 mmHg  
BP Cuff SizeLarge  
Pulse Rate-Agryemb096 bpm  
Nmhlay75 in  
Glsslr009 lbs 9.6 oz  
Body Mass Index57.9 kg/m2  
Body Surface Area2.4 m2  
Oxygen [place]07 %  
  
- Vitals taken 2024 05:21 pm  
BP-Sitting R154/86 mmHg  
BP Cuff SizeLarge  
  
- Vitals taken 2024 05:46 pm  
manual large  
BP-Sitting R144/84 mmHg  
BP Cuff SizeLarge  
  
Vital Signs:  
- Systolic Blood Pressure > or = 140 mmHg.  
- Diastolic blood pressure 80-89 mmHg.  
General Appearance:  
- Awake. - Alert. - Well developed. - Well nourished. - In no acute distress.  
Lungs:  
- Respiration rhythm and depth was normal. - Clear to auscultation.  
Cardiovascular:  
Heart Rate And Rhythm: - Normal.  
Heart Sounds: - Normal.  
Murmurs: - No murmurs were heard.  
Abdomen:  
Visual Inspection: - Abdomen was normal on visual inspection.  
Musculoskeletal System:  
General/bilateral: - Normal movement of all extremities.  
Skin:  
- General appearance was normal.  
  
** Tests **  
Blood Analysis:  
Hemoglobin Studies: ValueDate  
Blood hemoglobin A1c 7.1%2024  
Blood Endocrine Laboratory Tests: Value  
Blood glucose level by fingerstick Non-fasting 112 mg/dl  
Laboratory-based Chemistry:  
Other Laboratory Tests:  
Screening for sexually transmitted infections was not performed.  
Laboratory Studies:  
Vascular Procedures:  
Right Antecubital Space.  
Left Antecubital Space.  
  
** Assessment **  
- Z68.43 - Body mass index [BMI] 50.0-59.9, adult  
- Z01.812 - Encounter for preprocedural laboratory examination  
- Z23 - Encounter for immunization  
- I10 - Essential (primary) hypertension  
- N94.6 - Dysmenorrhea, unspecified  
- E11.9 - Type 2 diabetes mellitus without complications  
- F41.1 - Generalized anxiety disorder  
  
** Therapy **  
- Immunization administration age 18 or younger, with counseling, first / only   
vaccine component and age 18 or younger, with counseling, each additional 
vaccine   
component.  
  
** Vaccinations **  
- HPV-Gardasil 9 Dose #1 Status: Administered Date: 2024  
- PCV (Prevnar-20) Dose #1 Status: Administered Date: 2024  
  
- Received dose of human papilloma virus vaccine  
- Received dose of pneumococcal conjugate vaccine, 20-valent, IM use  
  
** Counseling/Education **  
- No not wishing to stop using electronic cigarettes/vaping  
- Discussed nutritional needs teach healthy choices including fruits and 
vegetables  
- Patient education about a proper diet  
- Referred Patient to a Diabetes Self-Management Program  
- Discussed concerns about exercise: promote physical activity  
  
** Plan **  
StartCited- Dysmenorrhea, unspecified  
Outside Diagn Tests/Ultrasound: US Transvaginal ([id number])  
Instructions: [place]  
Slynd 4 MG tablet take 1 tablet by mouth at the same time everyday to help 
regulate   
your period, 28 days, 2 refills  
EndCited  
StartCited- Generalized anxiety disorder  
busPIRone HCl 10 MG tablet take 1 tablet by mouth twice daily (d/c 5 mg rx), 30 
days,   
5 refills  
EndCited  
StartCited- Other  
Follow-up Appointment  
1 month med f/u and 2nd hpv  
EndCited  
StartCited- Type 2 diabetes mellitus without complications  
Januvia 50 MG tablet take 1 tablet by mouth once daily, 30 days, 5 refills  
Lisinopril 5 MG tablet take 1 tablet by mouth once daily, 30 days, 5 refills  
Atorvastatin Calcium 20 MG tablet take 1 tablet by mouth once daily at bedtime, 
30   
days, 5 refills  
Blood Glucose System Sriram Kit use to check sugars once daily (use what is 
covered), 30   
days, 0 refills  
Blood Glucose Test strip use to cehck sugars (dispense what is covered), 90 
days, 3   
refills  
Alcohol Pads 70% pad use to cleanse skin prior to checking blood sugars, 90 
days, 3   
refills  
CareSens Lancets each use to check sugars once daily (dispense what is covered),
 90   
days, 3 refills  
EndCited  
Modify diet , limit starchy carbs such as breads / pastas / sweets.  
  
** Other **  
- Patient tolerated venipuncture well; - Number of attempts for venipuncture two  
  
** Practice Management **  
Hemoglobin A1c level >=7.0 and < 8.0%.  
  
** Care Team **  
- Doreen Cabrera CNP  
  
** Health Reminders **  
- Assess BMI satisfied 2024.  
- Assess Tobacco Use satisfied 2024.  
- Follow Up Plan BMI Management satisfied 2024.  
- Hemoglobin A1c satisfied 2024.  
- Statin Therapy Needed satisfied 2024.  
  
** User Defined 1 **  
Not planning a pregnancy in the next year.  
  
  
[facility]08- Progress note*   
  
Progress note  
  
  
  
                                Date            Encounter       Last Documented   
by  
   
                                2024       Established Patient Last docu  
mented on 2024; 3:28 PM,   
Leonie HUTCHINSON; [facility]  
  
  
  
                                                      
  
  
** Active Problems & Conditions **  
- F90.9 - Attention-deficit Hyperactivity Disorder  
- F31.31 - Bipolar I Disorder, Most Recent Episode, Depressed Mild  
- E11.9 - Diabetes Mellitus Type 2 Without Complication  
- N94.6 - Dysmenorrhea  
- F43.10 - Post-traumatic Stress Disorder  
  
** Chief Complaint **  
The Chief Complaint is: [facility] met with patient to follow-up regarding mood and   
medications. Patient had requested to meet with a different [facility] in office but 
was not   
avialable and agreeable to meet with this  provider. Patient reports noticing 
that   
anxiety has continued to feel increased recently. Patient reports continued 
stressors   
related to physical health. Patient has scheduled with specialists, but 
appointment   
is not for several weeks still. Patient reports that Buspar is not helpful with   
increased anxiety. Patient reports experiencing nightmares and vivid dreams 
again and   
waking up with increased anxiety. Patient reports no thoughts of harm toward 
self or   
others.  
  
** History of Present Illness **  
Linnette Beckham is a 24 year old female.  
- Appetite not normal - Irritability  
- Anxiety - Nightmares - Energy level is fair - Feeling guilty - Easily 
distracted -   
Racing thoughts - No depression - No loss of interest in activities - No social   
isolation  
  
** Current Medication **  
- Alcohol Pads 70% use to cleanse skin prior to checking blood sugars, 90 days, 
3   
refills  
- ARIPiprazole 5 MG Oral Tablet take 1 tablet by mouth once daily, 30 days, 5 
refills  
- Atorvastatin Calcium 20 MG Oral Tablet take 1 tablet by mouth once daily at   
bedtime, 30 days, 5 refills  
- Blood Glucose System Sriram Kit use to check sugars once daily (use what is 
covered),   
30 days, 0 refills  
- Blood Glucose Test In Vitro Strip use to cehck sugars (dispense what is 
covered),   
90 days, 3 refills  
- busPIRone HCl 10 MG Oral Tablet take 1 tablet by mouth twice daily (d/c 5 mg 
rx),   
30 days, 5 refills  
- CareSens Lancets Miscellaneous use to check sugars once daily (dispense what 
is   
covered), 90 days, 3 refills  
- Intuniv 1 MG Oral Tablet Extended Release 24 Hour take 1 tablet by mouth once 
daily   
at bedtime, 30 days, 5 refills  
- Januvia 50 MG Oral Tablet take 1 tablet by mouth once daily, 30 days, 5 
refills  
- lamoTRIgine 100 MG Oral Tablet take 1 tablet by mouth once daily, 30 days, 5   
refills  
- Lisinopril 5 MG Oral Tablet take 1 tablet by mouth once daily, 30 days, 5 
refills  
- MiraLax 17 GM/SCOOP Oral Powder mix 1 scoop with 8 ounces once daily, 30 days,
 2   
refills  
- Narcan 4 MG/0.1ML Nasal Liquid spray in nostril for symptoms of overdose , may
   
repeat in 2 minutes if symptoms persist, 1 days, 0 refills  
- Prazosin HCl 1 MG Oral Capsule take 1 capsule by mouth twice daily, 30 days, 5
   
refills  
- Sertraline HCl 50 MG Oral Tablet take 1 tablet by mouth once daily, 30 days, 5
   
refills  
- Slynd 4 MG Oral Tablet take 1 tablet by mouth at the same time everyday to 
help   
regulate your period, 28 days, 2 refills  
- traZODone HCl 100 MG Oral Tablet take 1 tablet by mouth twice daily, 30 days, 
5   
refills  
  
** Social History **  
Medical: Chronic illness.  
Environmental Exposure: No secondhand cigarette smoke exposure.  
Personal: Recent emotional stress.  
Behavioral: Not a current tobacco user.  
Tobacco use: Using electronic cigarettes/vaping.  
Alcohol: Not using alcohol.  
Drug Use: Not using drugs.  
  
** Physical Findings **  
General Appearance:  
- Normal Appearance.  
Neurological:  
- Estimated intelligence was normal. - Oriented to time, place, and person. - No
   
hallucinations. - Judgement was not impaired.  
Speech: - Is Normal.  
Psychiatric:  
- Mood was anxious. - Mood was concerned. - Attitude Open.  
Demonstrated Behavior: - Motor Activity Normal Activity. - Eye Contact 
Appropriate.  
Affect: - Congruent with the mood.  
Thought Content: - Insight was intact. - No delusions. - No suicidal ideation. -
 No   
Passive thoughts of Death. - No suicidal plans. - No suicidal intent. - No 
homicidal   
ideations. - No homicidal plans. - No homicidal intent.  
Past Medical:  
- No repetitive self injurious behavior.  
  
** Assessment **  
- F90.9 - Attention-deficit hyperactivity disorder, unspecified type  
- F31.31 - Bipolar disorder, current episode depressed, mild  
- F43.10 - Post-traumatic stress disorder, unspecified  
  
** Therapy **  
- Brief solution-focused therapy.  
- Adherent with medications.  
-  Visit 30 Minutes.  
- Plan - PCP to have patient increase Buspar to 10mg twice daily and continue   
Prazosin 1mg in the morning and increase to 2 mg at bedtime. Patient agreeable 
to   
plan and Collaborated with patient and provider:  
  
** Counseling/Education **  
P offered active and supportive listening and processed recent stressors.  
BHP discussed coping skills and supports to implement in managing increased 
anxiety   
such as tools learned previously in therapy.  
BHP discussed sleep hygiene strategies that can be implemented.  
P encouraged patient to follow-up with specialists as scheduled.  
  
** Plan **  
P to task other  provider to follow-up with patient regarding medication 
changes.  
  
** Care Team **  
- Doreen Cabrera CNP  
  
** Health Reminders **  
- Assess Tobacco Use satisfied 2024.  
  
  
[facility]06- Instructions  
  
Includes: Instructions for all patient encounters  
  
  
  
                                                    Education and Decision Aids   
were provided during visit for:  
   
                                                    Discussed nutritional needs   
teach healthy choices including fruits and   
vegetables  
   
                                                    Last Documented On   
4 4:46PM ; [facility]  
   
                                                    Patient education about a pr  
oper diet  
   
                                                    Last Documented On   
4 4:46PM ; [facility]  
   
                                                    Discussed concerns about exe  
rcise : promote physical activity  
   
                                                    Last Documented On   
4 4:46PM ; [facility]  
   
                                                    Not requesting contraception  
   
                                                    Last Documented On   
4 4[facility]46PM [facility]  
rtive listening and processed current stressors   
related   
to getting medications. ~BHP discussed coping skills and supports to implement 
in   
daily routine. ~P discussed progress patient has felt they have made recently 
and   
encouraged continued follow-up with providers to address health  
   
                                                    Last Documented On   
4 5:16PM ; [facility]  
   
                                                    Discussed nutritional needs   
teach healthy choices including fruits and   
vegetables  
   
                                                    Last Documented On   
4 7:14PM ; [facility]  
   
                                                    Patient education about a pr  
oper diet  
   
                                                    Last Documented On   
4 7:14PM ; [facility]  
   
                                                    Discussed concerns about exe  
rcise : promote physical activity  
   
                                                    Last Documented On   
4 7:14PM ; [facility]  
   
                                                    Not requesting contraception  
   
                                                    Last Documented On   
4 7:14PM ; [facility]  
   
                                                    Discussed nutritional needs   
teach healthy choices including fruits and   
vegetables  
   
                                                    Last Documented On   
3 5:15PM ; [facility]  
   
                                                    Patient education about a pr  
oper diet  
   
                                                    Last Documented On   
3 5:15PM ; [facility]  
   
                                                    Discussed concerns about exe  
rcise : promote physical activity  
   
                                                    Last Documented On   
3 5:15PM ; [facility]  
   
                                                    Discussed nutritional needs   
teach healthy choices including fruits and   
vegetables  
   
                                                    Last Documented On 10/21/202  
2 1:42PM ; [facility]  
   
                                                    Patient education about a pr  
oper diet  
   
                                                    Last Documented On 10/21/202  
2 1:42PM ; [facility]  
   
                                                    Discussed concerns about exe  
rcise : promote physical activity  
   
                                                    Last Documented On 10/21/202  
2 1:42PM ; [facility]  
 and supportive feedback; normalized emotions and   
feelings, also provided pt time to process any current stressors. ~Promoted and   
encouraged follow-through with scheduling psychiatric services  
   
                                                    Last Documented On   
2 3:21PM ; [facility] provided supportive, empa  
thic listening and reflective feedback. ~Explored,   
encouraged, and supported the pt to discuss current sx/mood, assess risk for   
harm/need, coping mechanisms, support network and safety planning. ~Supported 
pt's   
plan to f/up with Dr. Alonso, as planned at [place], [place]. ~Encouraged 
pt to   
use safety plan, if needed to ensure she remains safe  
   
                                                    Last Documented On   
2 2:27PM ; [facility]  
   
                                                    Discussed nutritional needs   
teach healthy choices including fruits and   
vegetables  
   
                                                    Last Documented On   
2 3:20PM ; [facility]  
   
                                                    Patient education about a pr  
oper diet  
   
                                                    Last Documented On   
2 3:20PM ; [facility]  
   
                                                    Discussed concerns about exe  
rcise : promote physical activity ~ ~Will restart   
trazodone and prazosin ~ ~Patient is planning to have brother stay with her for 
a few   
days for emotional support ~ ~Follow up with PCP at next scheduled visit ~ ~Call
   
psychiatrist office to schedule appt ~ ~Call counselor  
   
                                                    Last Documented On   
2 9:35AM ; [facility]  
   
                                                    Provided supportive listenin  
g and empathic feedback; encouraged, explored, and   
supported the pt as she processed current symptoms, Issues, and concerns. 
~Discussed   
past tx and explored current needs/options. Acknowledged and validated pt's 
thoughts   
and emotions. ~Explored coping mechanisms and support network; utilized 
opportunity   
for safety planning; promoted seeking positive support and seeking help, as 
needed  
   
                                                    Last Documented On   
2 3:28PM ; [facility] provided active listenin  
g, support and helped pt process though current   
symptoms   
and stressor(s). Discussed and explored past effectiveness of medication; 
identified   
objectives and future goals; promoted use of healthy coping mechanisms, and 
self-care   
practices  
   
                                                    Last Documented On   
2 3:19PM ; [facility]  
   
                                                    Reviewed side effects and Ri  
sks/Benefits analysis  
   
                                                    Last Documented On   
2 3:19PM ; [facility]  
   
                                                    Discussed nutritional needs   
teach healthy choices including fruits and   
vegetables  
   
                                                    Last Documented On   
2 3:15PM ; [facility]  
   
                                                    Patient education about a pr  
oper diet  
   
                                                    Last Documented On   
2 3:15PM ; [facility]  
   
                                                    Discussed concerns about exe  
rcise : promote physical activity  
   
                                                    Last Documented On   
2 3:15PM ; [facility] introduced pt to [facility] in  
tegrated model of care ~[facility] offered active listening  
 and   
supportive feedback; normalized emotions and feelings, also provided pt time to   
process any current stressors ~[facility] discussed potential benefits of counseling 
and   
supported re-engaging, as needed. ~[facility] encouraged pt to continue to make time to
   
implement self-care regimen and use coping methods, as needed  
   
                                                    Last Documented On   
2 4:07PM ; [facility]  
   
                                                    Discussed nutritional needs   
teach healthy choices including fruits and   
vegetables  
   
                                                    Last Documented On   
2 3:15PM ; [facility]  
   
                                                    Patient education about a pr  
oper diet  
   
                                                    Last Documented On   
2 3:15PM ; [facility]  
   
                                                    Inquiry and counseling about  
 medication administration and compliance  
   
                                                    Last Documented On   
2 7:37PM ; [facility]  
   
                                                    Discussed concerns about exe  
rcise : promote physical activity  
   
                                                    Last Documented On   
2 3:15PM ; [facility]  
   
                                                    Patient goals discussed  
   
                                                    Last Documented On   
2 7:37PM ; [facility]  
   
                                                    Ansewred pt's questions re B  
orderlline Personality D/O and Bipolar D/O raised by  
   
psychiatrist at [place]. ~Validated and normalized patient?s feelings while   
assisting to process recent events  
   
                                                    Last Documented On   
1 1:33AM ; [facility]  
   
                                                    Discussed nutritional needs   
teach healthy choices including fruits and   
vegetables  
   
                                                    Last Documented On   
1 2:04PM ; [facility]  
   
                                                    Patient education about a pr  
oper diet  
   
                                                    Last Documented On   
1 2:04PM ; [facility]  
   
                                                    Discussed concerns about exe  
rcise : promote physical activity  
   
                                                    Last Documented On   
1 2:04PM ; [facility]  
   
                                                    [facility] provided active listenin  
g, support and helped patient process through   
current   
symptoms and stressors with ongoing mental health concerns and medication 
changes.   
~[facility] discussed coping skills and supports that patient is implementing.  
discussed   
implementing coping skills as discussed with counseling and attending weekly   
appointments as scheduled with counselor. Patient was encouraged to continue 
writing   
down concerns with medications and discuss with providers. ~P reminded patient
 of   
crisis resources should they be needed. Patient reports having crisis resources 
and   
could return to ER  
   
                                                    Last Documented On   
1 10:17AM ; [facility]  
   
                                                    Patient education about a pr  
oper diet  
   
                                                    Last Documented On   
1 5:17PM ; [facility]  
   
                                                    Patient education about meal  
 planning  
   
                                                    Last Documented On   
1 5:17PM ; [facility]  
   
                                                    Education about changing eat  
ing habits  
   
                                                    Last Documented On   
1 5:17PM ; [facility]  
   
                                                    Patient education about high  
 fiber diet  
   
                                                    Last Documented On   
1 5:17PM ; [facility]  
   
                                                    Patient education about low   
fat diet  
   
                                                    Last Documented On   
1 5:17PM ; [facility]  
   
                                                    Patient education about low   
cholesterol diet  
   
                                                    Last Documented On   
1 5:17PM ; [facility]  
   
                                                    Patient education about low   
carbohydrate diet  
   
                                                    Last Documented On   
1 5:17PM ; [facility]  
   
                                                    Patient education about high  
 protein diet  
   
                                                    Last Documented On   
1 5:17PM ; [facility] offered active and suppo  
rtive listening, normalized emotions and feelings,   
and   
processed current stressors. ~[facility] discussed resources for finding a counselor 
and   
provided list of local resources. ~P discussed patients coping skills and 
supports   
and encouraged patient to continue to implement. ~P reminded patient of crisis
   
resources should they be needed  
   
                                                    Last Documented On  7:15PM ; [facility]  
   
                                                    Discussed nutritional needs   
teach healthy choices including fruits and   
vegetables  
   
                                                    Last Documented On  11:38AM ; [facility]  
   
                                                    Patient education about a pr  
oper diet  
   
                                                    Last Documented On  11:38AM ; [facility]  
   
                                                    Patient education about a pr  
oper diet  
   
                                                    Last Documented On  12:19PM ; [facility]  
   
                                                    Patient education about meal  
 planning  
   
                                                    Last Documented On   
1 12:19PM ; [facility]  
   
                                                    Education about changing eat  
ing habits  
   
                                                    Last Documented On  12:19PM ; [facility]  
   
                                                    Patient education about high  
 fiber diet  
   
                                                    Last Documented On   
1 12:19PM ; [facility]  
   
                                                    Patient education about low   
fat diet  
   
                                                    Last Documented On  12:19PM ; [facility]  
   
                                                    Patient education about low   
cholesterol diet  
   
                                                    Last Documented On  12:19PM ; [facility]  
   
                                                    Patient education about low   
carbohydrate diet  
   
                                                    Last Documented On   
1 12:19PM ; [facility]  
   
                                                    Patient education about high  
 protein diet  
   
                                                    Last Documented On   
1 12:19PM ; [facility]  
   
                                                    Discussed concerns about exe  
rcise : promote physical activity  
   
                                                    Last Documented On  11:38AM ; [facility] provided active listenin  
g, support and helped patient process through   
current   
symptoms and stressors related to family conflict. ~[facility] discussed coping skills 
and   
supports with patient that can be implemented and reminded patient of ways to 
access   
additional resources. ~[facility] discussed crisis resources and plan. Patient has 
crisis   
resources still available should they be needed  
   
                                                    Last Documented On 06/10/202  
1 7:04PM ; [facility]  
   
                                                    Discussed nutritional needs   
teach healthy choices including fruits and   
vegetables  
   
                                                    Last Documented On 06/10/202  
1 3:57PM ; [facility]  
   
                                                    Patient education about a pr  
oper diet  
   
                                                    Last Documented On 06/10/202  
1 3:57PM ; [facility]  
   
                                                    Discussed concerns about exe  
rcise : promote physical activity  
   
                                                    Last Documented On 06/10/202  
1 3:57PM [facility] and PCP reassured   
patient of not sharing information with anyone unless she has signed a release 
for us   
to do so. ~P provided active listening, support and helped patient process 
through   
current symptoms and stressors. ~P discussed establishing counseling and   
psychiatry. P discussed EMDR therapy and ways to find provider who does this 
type   
of therapy. ~[facility] discussed crisis resources should mood worsen, [facility] provided 
text   
hotline number for crisis. P reviewed crisis plan with patient and patient is 
able   
to contact positive supports and family when feeling down  
   
                                                    Last Documented On   
1 11:46AM ; [facility]  
   
                                                    Discussed nutritional needs   
teach healthy choices including fruits and   
vegetables  
   
                                                    Last Documented On   
1 2:11PM ; [facility]  
   
                                                    Patient education about a pr  
oper diet  
   
                                                    Last Documented On   
1 2:11PM ; [facility]  
   
                                                    Discussed concerns about exe  
rcise : promote physical activity  
   
                                                    Last Documented On   
1 2:11PM ; [facility]  
Work Phone: [phone]- Evaluation note  
  
Includes: Assessments for all patient encounters  
  
  
  
                                Findings        Encounter       Date  
   
                                        [D64.9 - Anemia, unspecified] anemia Med  
ical Established Patient with Doreen Cabrera LARISSA                              2024  
  
  
  
                                                    Last Documented On   
4 6:51PM ; [facility]  
  
  
  
                                                    [Z68.43 - Body mass index [B  
MI]   
50.0-59.9, adult] assessment of body   
mass index                              Medical Established Patient with   
Doreen Cabrera LARISSA                        2024  
  
  
  
                                                    Last Documented On   
4 6:51PM ; [facility]  
  
  
  
                                                    Bipolar affective disorder,   
current   
episode depressed, mild                 BH Established Patient with Leonie   
Short LISWS                             2024  
  
  
  
                                                    Last Documented On   
4 5:16PM ; [facility]  
  
  
  
                                        Post-traumatic stress disorder BH Establ  
ished Patient with Leonie Short   
LISWS                                   2024  
  
  
  
                                                    Last Documented On   
4 5:16PM ; [facility]  
  
  
  
                                                    Undifferentiated attention d  
eficit   
disorder                                BH Established Patient with   
Leonie Short LISWS                     2024  
  
  
  
                                                    Last Documented On   
4 5:16PM ; [facility]  
  
  
  
                                        Visit for: screening for disorder BH Est  
ablished Patient with Leonie Garrett LISWS                             2024  
  
  
  
                                                    Last Documented On   
4 5:16PM ; [facility]  
  
  
  
                                                    [Z68.43 - Body mass index [B  
MI]   
50.0-59.9, adult] assessment of body   
mass index                              Medical Established Patient with   
Doreen Cabrera CNP                        2024  
  
  
  
                                                    Last Documented On   
4 7:50PM ; [facility]  
  
  
  
                                        Attention-deficit hyperactivity disorder  
 Medical Established Patient with   
Doreen Cabrera CNP                        2024  
  
  
  
                                                    Last Documented On   
4 7:50PM ; [facility]  
  
  
  
                                                    Diabetes Risk Test Score was  
 three   
score 3/27/2024                         Medical Established Patient with Doreen Cabrera CNP                              2024  
  
  
  
                                                    Last Documented On   
4 7:50PM ; [facility]  
  
  
  
                                        Visit for: screening for STD Medical Est  
ablished Patient with Doreen Cabrera   
CNP                                     2024  
  
  
  
                                                    Last Documented On   
4 7:50PM ; [facility]  
  
  
  
                                                    [Z68.32 - Body mass index [B  
MI]   
32.0-32.9, adult] assessment of body   
mass index                              Medical Established Patient with   
Doreen Cabrera CNP                        2023  
  
  
  
                                                    Last Documented On   
3 6:01PM ; [facility]  
  
  
  
                                        Borderline personality disorder Medical   
Established Patient with Doreen Cabrera CNP                              2023  
  
  
  
                                                    Last Documented On   
3 6:01PM ; [facility]  
  
  
  
                                        Screening for diabetes mellitus Medical   
Established Patient with Doreen Cabrera CNP                              2023  
  
  
  
                                                    Last Documented On   
3 6:01PM ; [facility]  
  
  
  
                                        Assessment of body mass index Medical Es  
tablished Patient with Doreen Cabrera CNP                              10/21/2022  
  
  
  
                                                    Last Documented On 10/21/202  
2 2:45PM ; [facility]  
  
  
  
                                                    Bipolar affective disorder,   
current   
episode manic                            [facility] with Haylie Aguilar LPCC-S                        2022  
  
  
  
                                                    Last Documented On   
2 3:22PM ; [facility]  
  
  
  
                                                    Bipolar affective disorder,   
current   
episode manic                            Established Patient with Haylienicanor Aguilar LPCC-S                        2022  
  
  
  
                                                    Last Documented On   
2 2:28PM ; [facility]  
  
  
  
                                                    Bipolar affective disorder,   
current   
episode depressed, severe with   
psychosis                                [facility] with Haylienicanor Aguilar LPCC-S                        2022  
  
  
  
                                                    Last Documented On   
2 3:29PM ; [facility]  
  
  
  
                                                    Assessment of body mass inde  
x [Body   
mass index [BMI] 50.0-59.9, adult]      Open Access - Established with Julieth   
Aliya CNP                               2022  
  
  
  
                                                    Last Documented On   
2 9:36AM ; [facility]  
  
  
  
                                                    Bipolar I disorder, most rec  
ent   
episode, manic                          Open Access - Established with Julieth   
Aliya CNP                               2022  
  
  
  
                                                    Last Documented On   
2 9:36AM ; [facility]  
  
  
  
                                        Post-traumatic stress disorder  Establ  
ished Patient with Haylienicanor Aguilar   
LPCC-S                                  2022  
  
  
  
                                                    Last Documented On   
2 3:20PM ; [facility]  
  
  
  
                                No cough        Medical Established Patient with  
 Doreen Deborah CNP 2022  
  
  
  
                                                    Last Documented On   
2 4:04PM ; [facility]  
  
  
  
                                                    Z68.43 - Body mass index [BM  
I]   
50.0-59.9, adult                        Medical Established Patient with   
Doreen Deborah CNP                        2022  
  
  
  
                                                    Last Documented On   
2 4:04PM ; [facility]  
  
  
  
                                                    Borderline personality disor  
danny Pt   
reported hx of sx/dx                     Established Patient with Haylienicanor Martinerson LPCC-S                        2022  
  
  
  
                                                    Last Documented On   
2 4:08PM ; [facility]  
  
  
  
                                                    Assessment of body mass inde  
x [Body   
mass index [BMI] 50.0-59.9, adult]      [facility] Access - Established with Julieth   
Aliya CNP                               2022  
  
  
  
                                                    Last Documented On   
2 7:41PM ; [facility]  
  
  
  
                                                    Diabetes Risk Test Score was  
 three   
score 2022                         Open Access - Established with Julieth   
Aliya CNP                               2022  
  
  
  
                                                    Last Documented On   
2 7:41PM ; [facility]  
  
  
  
                                                    Bipolar I disorder, most rec  
ent   
episode, manic                           Established Patient with Avis   
Felder LPCC-S                          2021  
  
  
  
                                                    Last Documented On   
1 1:35AM ; [facility]  
  
  
  
                                        Borderline personality disorder  Estab  
lished Patient with Avis   
Felder LPCC-S                          2021  
  
  
  
                                                    Last Documented On   
1 1:35AM ; [facility]  
  
  
  
                                        Post-traumatic stress disorder  Establ  
ished Patient with Avis   
Felder LPCC-S                          2021  
  
  
  
                                                    Last Documented On   
1 1:35AM ; [facility]  
  
  
  
                                                    Assessment of visit for: bhargavi guevaraning for   
human immunodeficiency virus            Medical Established Patient with   
Doreen Deborah CNP                        2021  
  
  
  
                                                    Last Documented On   
1 2:56PM ; [facility]  
  
  
  
                                Nicotine dependence Medical Established Patient   
with Odreen Deborah CNP 2021  
  
  
  
                                                    Last Documented On   
1 2:56PM ; [facility]  
  
  
  
                                Tachycardia     Medical Established Patient with  
 Doreen Deborah CNP 2021  
  
  
  
                                                    Last Documented On   
1 2:56PM ; [facility]  
  
  
  
                                                    Z68.43 - Body mass index [BM  
I]   
50.0-59.9, adult                        Medical Established Patient with   
Doreen Deborah CNP                        2021  
  
  
  
                                                    Last Documented On   
1 2:56PM ; [facility]  
  
  
  
                                                    Bipolar I disorder, most rec  
ent   
episode, manic                           Established Patient with Leonie   
Short LISWS                             2021  
  
  
  
                                                    Last Documented On   
1 10:17AM ; [facility]  
  
  
  
                                                    Borderline personality disor  
danny per   
patient reported history                 Established Patient with Leonie   
Short LISWS                             2021  
  
  
  
                                                    Last Documented On   
1 10:17AM ; [facility]  
  
  
  
                                Nicotine dependence  Established Patient with   
Leonie Short LISWS 2021  
  
  
  
                                                    Last Documented On   
1 10:17AM ; [facility]  
  
  
  
                                        Post-traumatic stress disorder  Establ  
ished Patient with Leonie Short   
LISWS                                   2021  
  
  
  
                                                    Last Documented On   
1 10:17AM ; [facility]  
  
  
  
                                Body mass index Medical Established Patient with  
 Doreen Deborah CNP 2021  
  
  
  
                                                    Last Documented On   
1 5:23PM ; [facility]  
  
  
  
                                Morbid obesity  Medical Established Patient with  
 Doreen Deborah CNP 2021  
  
  
  
                                                    Last Documented On   
1 5:23PM ; [facility]  
  
  
  
                                        Nicotine dependence uncomplicated Medica  
l Established Patient with Doreen   
Deborah CNP                              2021  
  
  
  
                                                    Last Documented On   
1 5:23PM ; [facility]  
  
  
  
                                                    Z68.42 - Body mass index [BM  
I]   
45.0-49.9, adult                        Medical Established Patient with   
Doreen Deborah CNP                        2021  
  
  
  
                                                    Last Documented On   
1 5:23PM ; [facility]  
  
  
  
                                Episodic mood disorders On Call with Pamela edwards CNP 2021  
  
  
  
                                                    Last Documented On   
1 7:49AM ; [facility]  
  
  
  
                                                    Mood disorders, NOS per tabatha  
ent   
reported history                         Established Patient with Leonie   
Short LISWS                             2021  
  
  
  
                                                    Last Documented On   
1 7:15PM ; [facility]  
  
  
  
                                        Post-traumatic stress disorder  Establ  
ished Patient with Leonie Short   
LISWS                                   2021  
  
  
  
                                                    Last Documented On   
1 7:15PM ; [facility]  
  
  
  
                                Morbid obesity  Medical Established Patient with  
 Doreen Deborah CNP 2021  
  
  
  
                                                    Last Documented On   
1 12:31PM ; [facility]  
  
  
  
                                Otitis externa  Medical Established Patient with  
 Doreen Deborah CNP 2021  
  
  
  
                                                    Last Documented On   
1 12:31PM ; [facility]  
  
  
  
                                                    Z68.42 - Body mass index [BM  
I]   
45.0-49.9, adult                        Medical Established Patient with   
Doreen Deborah CNP                        2021  
  
  
  
                                                    Last Documented On   
1 12:31PM ; [facility]  
  
  
  
                                        Post-traumatic stress disorder  Establ  
ished Patient with Leonie Short   
LISWS                                   06/10/2021  
  
  
  
                                                    Last Documented On 06/10/202  
1 10:05PM ; [facility]  
  
  
  
                                Morbid obesity  Medical Established Patient with  
 Doreen Deborah CNP 06/10/2021  
  
  
  
                                                    Last Documented On 06/10/202  
1 4:45PM ; [facility]  
  
  
  
                                                    Z68.42 - Body mass index [BM  
I]   
45.0-49.9, adult                        Medical Established Patient with   
Doreen Deborah CNP                        06/10/2021  
  
  
  
                                                    Last Documented On 06/10/202  
1 4:45PM ; [facility]  
  
  
  
                                        Post-traumatic stress disorder  Establ  
ished Patient with Leonie Short   
LISWS                                   2021  
  
  
  
                                                    Last Documented On   
1 11:59AM ; [facility]  
  
  
  
                                                    Assessment of visit for: bhargavi myles for   
human immunodeficiency virus            Medical New Patient with Doreen   
Deborah CNP                              2021  
  
  
  
                                                    Last Documented On   
1 4:06PM ; [facility]  
  
  
  
                                                    Diabetes Risk Test Score was  
 one score   
2021                               Medical New Patient with Doreen   
Deborah CNP                              2021  
  
  
  
                                                    Last Documented On   
1 4:06PM ; [facility]  
  
  
  
                                Hypertension    Medical New Patient with Doreen C  
cate CNP 2021  
  
  
  
                                                    Last Documented On   
1 4:06PM ; [facility]  
  
  
  
                                Morbid obesity  Medical New Patient with Doreen C  
cate CNP 2021  
  
  
  
                                                    Last Documented On   
1 4:06PM ; [facility]  
  
  
  
                                Post-traumatic stress disorder Medical New Patie  
nt with Doreen Deborah CNP   
2021  
  
  
  
                                                    Last Documented On   
1 4:06PM ; [facility]  
  
  
  
                                                    Z68.42 - Body mass index [BM  
I]   
45.0-49.9, adult                        Medical New Patient with Doreen   
Deborah CNP                              2021  
  
  
  
                                                    Last Documented On   
1 4:06PM ; [facility]  
Work Phone: [phone]- Evaluation note  
  
Includes: Assessments for all patient encounters  
  
  
  
                                Findings        Encounter       Date  
   
                                                    Attention-deficit hyperactiv  
ity   
disorder                                 Established Patient with Leonie   
Short LISWS                             2024  
  
  
  
                                                    Last Documented On   
4 10:10AM ; [facility]  
  
  
  
                                                    Bipolar I disorder, most rec  
ent   
episode, depressed - mild                Established Patient with Leonie   
Short LISWS                             2024  
  
  
  
                                                    Last Documented On   
4 10:10AM ; [facility]  
  
  
  
                                        Post-traumatic stress disorder BH Establ  
ished Patient with Leonie Short   
LISWS                                   2024  
  
  
  
                                                    Last Documented On   
4 10:10AM ; [facility]  
  
  
  
                                        [D64.9 - Anemia, unspecified] anemia Med  
ical Established Patient with   
Doreenpaola Cabrera CNP                        2024  
  
  
  
                                                    Last Documented On   
4 6:51PM ; [facility]  
  
  
  
                                                    [Z68.43 - Body mass index [B  
MI]   
50.0-59.9, adult] assessment of body   
mass index                              Medical Established Patient with   
Doreen Cabrera CNP                        2024  
  
  
  
                                                    Last Documented On   
4 6:51PM ; [facility]  
  
  
  
                                                    Bipolar affective disorder,   
current   
episode depressed, mild                  Established Patient with Leonie   
Short LISWS                             2024  
  
  
  
                                                    Last Documented On   
4 5:16PM ; [facility]  
  
  
  
                                        Post-traumatic stress disorder BH Establ  
ished Patient with Leonie Short   
LISWS                                   2024  
  
  
  
                                                    Last Documented On   
4 5:16PM ; [facility]  
  
  
  
                                                    Undifferentiated attention d  
eficit   
disorder                                 Established Patient with   
Leonie Short LISWS                     2024  
  
  
  
                                                    Last Documented On   
4 5:16PM ; [facility]  
  
  
  
                                        Visit for: screening for disorder  Est  
ablished Patient with Leonie   
Short LISWS                             2024  
  
  
  
                                                    Last Documented On   
4 5:16PM ; [facility]  
  
  
  
                                                    [Z68.43 - Body mass index [B  
MI]   
50.0-59.9, adult] assessment of body   
mass index                              Medical Established Patient with   
Doreen Cabrera CNP                        2024  
  
  
  
                                                    Last Documented On   
4 7:50PM ; [facility]  
  
  
  
                                        Attention-deficit hyperactivity disorder  
 Medical Established Patient with   
Doreenpaola Mccormacken CNP                        2024  
  
  
  
                                                    Last Documented On   
4 7:50PM ; [facility]  
  
  
  
                                                    Diabetes Risk Test Score was  
 three   
score 3/27/2024                         Medical Established Patient with Doreenpaola Cabrera CNP                              2024  
  
  
  
                                                    Last Documented On   
4 7:50PM ; [facility]  
  
  
  
                                        Visit for: screening for STD Medical Est  
ablished Patient with Doreen Cabrera   
CNP                                     2024  
  
  
  
                                                    Last Documented On   
4 7:50PM ; [facility]  
  
  
  
                                                    [Z68.32 - Body mass index [B  
MI]   
32.0-32.9, adult] assessment of body   
mass index                              Medical Established Patient with   
Doreen Cabrera CNP                        2023  
  
  
  
                                                    Last Documented On   
3 6:01PM ; [facility]  
  
  
  
                                        Borderline personality disorder Medical   
Established Patient with Doreen Cabrera CNP                              2023  
  
  
  
                                                    Last Documented On   
3 6:01PM ; [facility]  
  
  
  
                                        Screening for diabetes mellitus Medical   
Established Patient with Doreen Cabrera CNP                              2023  
  
  
  
                                                    Last Documented On   
3 6:01PM ; [facility]  
  
  
  
                                        Assessment of body mass index Medical Es  
tablished Patient with Doreen Cabrera CNP                              10/21/2022  
  
  
  
                                                    Last Documented On 10/21/202  
2 2:45PM ; [facility]  
  
  
  
                                                    Bipolar affective disorder,   
current   
episode manic                            [facility] with Haylie   
Aguilar LPCC-S                        2022  
  
  
  
                                                    Last Documented On   
2 3:22PM ; [facility]  
  
  
  
                                                    Bipolar affective disorder,   
current   
episode manic                            Established Patient with Haylie   
Aguilar LPCC-S                        2022  
  
  
  
                                                    Last Documented On   
2 2:28PM ; [facility]  
  
  
  
                                                    Bipolar affective disorder,   
current   
episode depressed, severe with   
psychosis                                [facility] with Haylie   
Aguilar LPCC-S                        2022  
  
  
  
                                                    Last Documented On   
2 3:29PM ; [facility]  
  
  
  
                                                    Assessment of body mass inde  
x [Body   
mass index [BMI] 50.0-59.9, adult]      Open Access - Established with Julieth   
Aliya CNP                               2022  
  
  
  
                                                    Last Documented On   
2 9:36AM ; [facility]  
  
  
  
                                                    Bipolar I disorder, most rec  
ent   
episode, manic                          Open Access - Established with Julieth   
Aliya CNP                               2022  
  
  
  
                                                    Last Documented On   
2 9:36AM ; [facility]  
  
  
  
                                        Post-traumatic stress disorder BH Establ  
ished Patient with Haylie Aguilar   
LPCC-S                                  2022  
  
  
  
                                                    Last Documented On   
2 3:20PM ; [facility]  
  
  
  
                                No cough        Medical Established Patient with  
 Doreen Deborah CNP 2022  
  
  
  
                                                    Last Documented On   
2 4:04PM ; [facility]  
  
  
  
                                                    Z68.43 - Body mass index [BM  
I]   
50.0-59.9, adult                        Medical Established Patient with   
Doreen Deborah CNP                        2022  
  
  
  
                                                    Last Documented On   
2 4:04PM ; [facility]  
  
  
  
                                                    Borderline personality disor  
danny Pt   
reported hx of sx/dx                     Established Patient with Haylie Aguilar LPCC-S                        2022  
  
  
  
                                                    Last Documented On   
2 4:08PM ; [facility]  
  
  
  
                                                    Assessment of body mass inde  
x [Body   
mass index [BMI] 50.0-59.9, adult]      Open Access - Established with Julieth Pisano CNP                               2022  
  
  
  
                                                    Last Documented On   
2 7:41PM ; [facility]  
  
  
  
                                                    Diabetes Risk Test Score was  
 three   
score 2022                         Open Access - Established with Julieth   
Aliya CNP                               2022  
  
  
  
                                                    Last Documented On   
2 7:41PM ; [facility]  
  
  
  
                                                    Bipolar I disorder, most rec  
ent   
episode, manic                           Established Patient with Avis   
Felder LPCC-S                          2021  
  
  
  
                                                    Last Documented On   
1 1:35AM ; [facility]  
  
  
  
                                        Borderline personality disorder  Estab  
lished Patient with Avis   
Felder LPCC-S                          2021  
  
  
  
                                                    Last Documented On   
1 1:35AM ; [facility]  
  
  
  
                                        Post-traumatic stress disorder  Establ  
ished Patient with Avis   
Felder LPCC-S                          2021  
  
  
  
                                                    Last Documented On   
1 1:35AM ; [facility]  
  
  
  
                                                    Assessment of visit for: scr  
eening for   
human immunodeficiency virus            Medical Established Patient with   
Doreenpaola Mccormacken CNP                        2021  
  
  
  
                                                    Last Documented On   
1 2:56PM ; [facility]  
  
  
  
                                Nicotine dependence Medical Established Patient   
with Doreen Deborah CNP 2021  
  
  
  
                                                    Last Documented On   
1 2:56PM ; [facility]  
  
  
  
                                Tachycardia     Medical Established Patient with  
 Doreen Deborah CNP 2021  
  
  
  
                                                    Last Documented On   
1 2:56PM ; [facility]  
  
  
  
                                                    Z68.43 - Body mass index [BM  
I]   
50.0-59.9, adult                        Medical Established Patient with   
Doreenpaola Cabrera CNP                        2021  
  
  
  
                                                    Last Documented On   
1 2:56PM ; [facility]  
  
  
  
                                                    Bipolar I disorder, most rec  
ent   
episode, manic                           Established Patient with Leonie   
Short LISWS                             2021  
  
  
  
                                                    Last Documented On   
1 10:17AM ; [facility]  
  
  
  
                                                    Borderline personality disor  
danny per   
patient reported history                 Established Patient with Leonie   
Short LISWS                             2021  
  
  
  
                                                    Last Documented On   
1 10:17AM ; [facility]  
  
  
  
                                Nicotine dependence  Established Patient with   
Leonie Short LISWS 2021  
  
  
  
                                                    Last Documented On   
1 10:17AM ; [facility]  
  
  
  
                                        Post-traumatic stress disorder  Establ  
ished Patient with Leonie Short   
LISWS                                   2021  
  
  
  
                                                    Last Documented On   
1 10:17AM ; [facility]  
  
  
  
                                Body mass index Medical Established Patient with  
 Doreen Deborah CNP 2021  
  
  
  
                                                    Last Documented On   
1 5:23PM ; [facility]  
  
  
  
                                Morbid obesity  Medical Established Patient with  
 Doreen Deborah CNP 2021  
  
  
  
                                                    Last Documented On   
1 5:23PM ; [facility]  
  
  
  
                                        Nicotine dependence uncomplicated Medica  
l Established Patient with Doreen   
Deborah CNP                              2021  
  
  
  
                                                    Last Documented On   
1 5:23PM ; [facility]  
  
  
  
                                                    Z68.42 - Body mass index [BM  
I]   
45.0-49.9, adult                        Medical Established Patient with   
Doreen Deborah CNP                        2021  
  
  
  
                                                    Last Documented On   
1 5:23PM ; [facility]  
  
  
  
                                Episodic mood disorders On Call with Pamela edwards CNP 2021  
  
  
  
                                                    Last Documented On   
1 7:49AM ; [facility]  
  
  
  
                                                    Mood disorders, NOS per tabatha  
ent   
reported history                         Established Patient with Leonie   
Short LISWS                             2021  
  
  
  
                                                    Last Documented On   
1 7:15PM ; [facility]  
  
  
  
                                        Post-traumatic stress disorder  Establ  
ished Patient with Leonie Short   
LISWS                                   2021  
  
  
  
                                                    Last Documented On   
1 7:15PM ; [facility]  
  
  
  
                                Morbid obesity  Medical Established Patient with  
 Doreen Deborah CNP 2021  
  
  
  
                                                    Last Documented On   
1 12:31PM ; [facility]  
  
  
  
                                Otitis externa  Medical Established Patient with  
 Doreen Deborah CNP 2021  
  
  
  
                                                    Last Documented On   
1 12:31PM ; [facility]  
  
  
  
                                                    Z68.42 - Body mass index [BM  
I]   
45.0-49.9, adult                        Medical Established Patient with   
Doreen Deborah CNP                        2021  
  
  
  
                                                    Last Documented On   
1 12:31PM ; [facility]  
  
  
  
                                        Post-traumatic stress disorder BH Establ  
ished Patient with Leonie Short   
LISWS                                   06/10/2021  
  
  
  
                                                    Last Documented On 06/10/202  
1 10:05PM ; [facility]  
  
  
  
                                Morbid obesity  Medical Established Patient with  
 Doreen Deborah CNP 06/10/2021  
  
  
  
                                                    Last Documented On 06/10/202  
1 4:45PM ; [facility]  
  
  
  
                                                    Z68.42 - Body mass index [BM  
I]   
45.0-49.9, adult                        Medical Established Patient with   
Doreen Deborah CNP                        06/10/2021  
  
  
  
                                                    Last Documented On 06/10/202  
1 4:45PM ; [facility]  
  
  
  
                                        Post-traumatic stress disorder  Establ  
ished Patient with Leonie Short   
LISWS                                   2021  
  
  
  
                                                    Last Documented On   
1 11:59AM ; [facility]  
  
  
  
                                                    Assessment of visit for: bhargavi guevaraning for   
human immunodeficiency virus            Medical New Patient with Doreenpaola Cabrera CNP                              2021  
  
  
  
                                                    Last Documented On   
1 4:06PM ; [facility]  
  
  
  
                                                    Diabetes Risk Test Score was  
 one score   
2021                               Medical New Patient with Doreen   
Deborah CNP                              2021  
  
  
  
                                                    Last Documented On   
1 4:06PM ; [facility]  
  
  
  
                                Hypertension    Medical New Patient with Doreen DAVID almonteen CNP 2021  
  
  
  
                                                    Last Documented On   
1 4:06PM ; [facility]  
  
  
  
                                Morbid obesity  Medical New Patient with Doreen C  
cate CNP 2021  
  
  
  
                                                    Last Documented On   
1 4:06PM ; [facility]  
  
  
  
                                Post-traumatic stress disorder Medical New Patie  
nt with Doreen Cabrera CNP   
2021  
  
  
  
                                                    Last Documented On   
1 4:06PM ; [facility]  
  
  
  
                                                    Z68.42 - Body mass index [BM  
I]   
45.0-49.9, adult                        Medical New Patient with Doreen Cabrera CNP                              2021  
  
  
  
                                                    Last Documented On   
1 4:06PM ; [facility]  
  
[facility]  
Work Phone: [phone]- History general Narrative - Reported  
  
Includes: Medical History in patient's chart  
  
  
  
                                        Description         Last Updated  
   
                                        POTS                2024  
  
  
  
                                                    Last Documented On   
4 6:51PM ; [facility]  
  
  
  
                                        0 previous live birth(s) 2024  
  
  
  
                                                    Last Documented On   
4 7:50PM ; [facility]  
  
  
  
                                        Previously pregnant 1 time(s) 2024  
  
  
  
                                                    Last Documented On   
4 7:50PM ; [facility]  
  
  
  
                                        Recent immunization for flu 2024  
  
  
  
                                                    Last Documented On   
4 7:50PM ; [facility]  
  
  
  
                                        Has sex without a condom 2022  
  
  
  
                                                    Last Documented On   
2 4:04PM ; [facility]  
  
  
  
                                        Not planning to have a baby in the next   
12 months 2022  
  
  
  
                                                    Last Documented On   
2 4:04PM ; [facility]  
  
  
  
                                        Partners sexually transmitted infection   
status known 2022  
  
  
  
                                                    Last Documented On   
2 4:04PM ; [facility]  
  
  
  
                                        No previous hospitalizations 2022  
  
  
  
                                                    Last Documented On   
2 4:04PM ; [facility]  
  
  
  
                                        Chronic illness     2021  
  
  
  
                                                    Last Documented On   
1 7:49AM ; [facility]  
  
  
  
                                        Exposure to COVID-19 2021  
  
  
  
                                                    Last Documented On   
1 12:31PM ; [facility]  
  
  
  
                                        History of gynecologic disorder 20  
21  
  
  
  
                                                    Last Documented On   
1 4:06PM ; [facility]  
  
  
  
                                        History of Polycystic Ovarian Syndrome (  
PCOS) 2021  
  
  
  
                                                    Last Documented On   
1 4:06PM ; [facility]  
  
  
  
                                        History of anxiety disorder NOS 20  
21  
  
  
  
                                                    Last Documented On   
1 4:06PM ; [facility]  
  
  
  
                                        History of migraine headache 2021  
  
  
  
                                                    Last Documented On   
1 4:06PM ; [facility]  
  
  
  
                                        History of psychiatric disorders biopola  
r disorder 2021  
  
  
  
                                                    Last Documented On   
1 4:06PM ; [facility]  
  
[facility]  
Work Phone: [phone]- History general Narrative - Reported  
  
Includes: Medical History in patient's chart  
  
  
  
                                        Description         Last Updated  
   
                                        POTS                2024  
  
  
  
                                                    Last Documented On   
4 6:51PM ; [facility]  
  
  
  
                                        0 previous live birth(s) 2024  
  
  
  
                                                    Last Documented On   
4 7:50PM ; [facility]  
  
  
  
                                        Previously pregnant 1 time(s) 2024  
  
  
  
                                                    Last Documented On   
4 7:50PM ; [facility]  
  
  
  
                                        Recent immunization for flu 2024  
  
  
  
                                                    Last Documented On   
4 7:50PM ; [facility]  
  
  
  
                                        Has sex without a condom 2022  
  
  
  
                                                    Last Documented On   
2 4:04PM ; [facility]  
  
  
  
                                        Not planning to have a baby in the next   
12 months 2022  
  
  
  
                                                    Last Documented On   
2 4:04PM ; [facility]  
  
  
  
                                        Partners sexually transmitted infection   
status known 2022  
  
  
  
                                                    Last Documented On   
2 4:04PM ; [facility]  
  
  
  
                                        No previous hospitalizations 2022  
  
  
  
                                                    Last Documented On   
2 4:04PM ; [facility]  
  
  
  
                                        Chronic illness     2021  
  
  
  
                                                    Last Documented On   
1 7:49AM ; [facility]  
  
  
  
                                        Exposure to COVID-19 2021  
  
  
  
                                                    Last Documented On   
1 12:31PM ; [facility]  
  
  
  
                                        History of gynecologic disorder 20  
21  
  
  
  
                                                    Last Documented On   
1 4:06PM ; [facility]  
  
  
  
                                        History of Polycystic Ovarian Syndrome (  
PCOS) 2021  
  
  
  
                                                    Last Documented On   
1 4:06PM ; [facility]  
  
  
  
                                        History of anxiety disorder NOS 20  
21  
  
  
  
                                                    Last Documented On   
1 4:06PM ; [facility]  
  
  
  
                                        History of migraine headache 2021  
  
  
  
                                                    Last Documented On   
1 4:06PM ; [facility]  
  
  
  
                                        History of psychiatric disorders biopola  
r disorder 2021  
  
  
  
                                                    Last Documented On   
1 4:06PM ; [facility]  
  
[facility]  
Work Phone: [phone]- History general Narrative - Reported  
  
Includes: Medical History in patient's chart  
  
  
  
                                        Description         Last Updated  
   
                                        POTS                2024  
  
  
  
                                                    Last Documented On   
4 6:51PM ; [facility]  
  
  
  
                                        0 previous live birth(s) 2024  
  
  
  
                                                    Last Documented On   
4 7:50PM ; [facility]  
  
  
  
                                        Previously pregnant 1 time(s) 2024  
  
  
  
                                                    Last Documented On   
4 7:50PM ; [facility]  
  
  
  
                                        Recent immunization for flu 2024  
  
  
  
                                                    Last Documented On   
4 7:50PM ; [facility]  
  
  
  
                                        Has sex without a condom 2022  
  
  
  
                                                    Last Documented On   
2 4:04PM ; [facility]  
  
  
  
                                        Not planning to have a baby in the next   
12 months 2022  
  
  
  
                                                    Last Documented On   
2 4:04PM ; [facility]  
  
  
  
                                        Partners sexually transmitted infection   
status known 2022  
  
  
  
                                                    Last Documented On   
2 4:04PM ; [facility]  
  
  
  
                                        No previous hospitalizations 2022  
  
  
  
                                                    Last Documented On   
2 4:04PM ; [facility]  
  
  
  
                                        Chronic illness     2021  
  
  
  
                                                    Last Documented On   
1 7:49AM ; [facility]  
  
  
  
                                        Exposure to COVID-19 2021  
  
  
  
                                                    Last Documented On   
1 12:31PM ; [facility]  
  
  
  
                                        History of gynecologic disorder 20  
21  
  
  
  
                                                    Last Documented On   
1 4:06PM ; [facility]  
  
  
  
                                        History of Polycystic Ovarian Syndrome (  
PCOS) 2021  
  
  
  
                                                    Last Documented On   
1 4:06PM ; [facility]  
  
  
  
                                        History of anxiety disorder NOS 20  
21  
  
  
  
                                                    Last Documented On   
1 4:06PM ; [facility]  
  
  
  
                                        History of migraine headache 2021  
  
  
  
                                                    Last Documented On   
1 4:06PM ; [facility]  
  
  
  
                                        History of psychiatric disorders biopola  
r disorder 2021  
  
  
  
                                                    Last Documented On   
1 4:06PM ; [facility]  
  
[facility]  
Work Phone: [phone]- History general Narrative - Reported  
  
Includes: Medical History in patient's chart  
  
  
  
                                        Description         Last Updated  
   
                                        POTS                2024  
  
  
  
                                                    Last Documented On   
4 6:51PM ; [facility]  
  
  
  
                                        0 previous live birth(s) 2024  
  
  
  
                                                    Last Documented On   
4 7:50PM ; [facility]  
  
  
  
                                        Previously pregnant 1 time(s) 2024  
  
  
  
                                                    Last Documented On   
4 7:50PM ; [facility]  
  
  
  
                                        Recent immunization for flu 2024  
  
  
  
                                                    Last Documented On   
4 7:50PM ; [facility]  
  
  
  
                                        Has sex without a condom 2022  
  
  
  
                                                    Last Documented On   
2 4:04PM ; [facility]  
  
  
  
                                        Not planning to have a baby in the next   
12 months 2022  
  
  
  
                                                    Last Documented On   
2 4:04PM ; [facility]  
  
  
  
                                        Partners sexually transmitted infection   
status known 2022  
  
  
  
                                                    Last Documented On   
2 4:04PM ; [facility]  
  
  
  
                                        No previous hospitalizations 2022  
  
  
  
                                                    Last Documented On   
2 4:04PM ; [facility]  
  
  
  
                                        Chronic illness     2021  
  
  
  
                                                    Last Documented On   
1 7:49AM ; [facility]  
  
  
  
                                        Exposure to COVID-19 2021  
  
  
  
                                                    Last Documented On   
1 12:31PM ; [facility]  
  
  
  
                                        History of gynecologic disorder 20  
21  
  
  
  
                                                    Last Documented On   
1 4:06PM ; [facility]  
  
  
  
                                        History of Polycystic Ovarian Syndrome (  
PCOS) 2021  
  
  
  
                                                    Last Documented On   
1 4:06PM ; [facility]  
  
  
  
                                        History of anxiety disorder NOS 20  
21  
  
  
  
                                                    Last Documented On   
1 4:06PM ; [facility]  
  
  
  
                                        History of migraine headache 2021  
  
  
  
                                                    Last Documented On   
1 4:06PM ; [facility]  
  
  
  
                                        History of psychiatric disorders biopola  
r disorder 2021  
  
  
  
                                                    Last Documented On   
1 4:06PM ; [facility]  
Work Phone: [phone]- Progress note*   
  
Progress note  
  
  
  
                                Date            Encounter       Last Documented   
by  
   
                                2024      Medical Established Patient Last  
 documented on 2024; 6:51 PM,   
Doreen Cabrera CNP; [facility]  
  
  
  
                                                      
  
  
** Active Problems & Conditions **  
- F90.9 - Attention-deficit Hyperactivity Disorder  
- F31.31 - Bipolar I Disorder, Most Recent Episode, Depressed Mild  
- F43.10 - Post-traumatic Stress Disorder  
  
** Chief Complaint **  
The Chief Complaint is: Patient is not seeing [facility] any longer and wants to
   
discuss metformin. Patient is still taking med, but it is still getting diarrhea
 and   
nausea. Patient is wanting to get into [facility]. She has tried to contact Dr. Patterson 
in   
[place], but has not heard back. Patient provided with phone number for [facility]   
[facility].  
Patient here for 2 month med check. Patient is taking meds as prescribed. 
Patient is   
still feeling anxiousness at times.  
Patient refused HPV and PCV.  
  
** Referred Here **  
Not referred by urgent care clinic and not the emergency room. No prior 
encounters.  
- Data to be reviewed: no clinical lab tests  
  
** History of Present Illness **  
Linnette Beckham is a 24 year old female.  
- Allergy list reviewed - Reviewed Medications - Medication list reviewed  
- Date of last menstruation 2024 - Pregnancy test - would not like a 
pregnancy   
test  
Presents with sig other  Tyesha  for med f/u . wants to get a new gyn . reports 
no   
longer doing counseling r/t  she said I dont need it  labs reviewed from last 
month   
ER visit and I explained her cbc shows anemia , likely r/t heavy periods . I 
would   
like them to get further labs as pt does admit to fatigue / sleeping 14 hours 
per day  
  
** Current Medication **  
- ARIPiprazole 5 MG Oral Tablet take 1 tablet by mouth once daily, 30 days, 5 
refills  
- busPIRone HCl 5 MG Oral Tablet take 1 tablet by mouth twice daily, 30 days, 5   
refills  
- Intuniv 1 MG Oral Tablet Extended Release 24 Hour take 1 tablet by mouth once 
daily   
at bedtime, 30 days, 5 refills  
- lamoTRIgine 100 MG Oral Tablet take 1 tablet by mouth once daily, 30 days, 5   
refills  
- MiraLax 17 GM/SCOOP Oral Powder mix 1 scoop with 8 ounces once daily, 30 days,
 2   
refills  
- Narcan 4 MG/0.1ML Nasal Liquid spray in nostril for symptoms of overdose , may
   
repeat in 2 minutes if symptoms persist, 1 days, 0 refills  
- Prazosin HCl 1 MG Oral Capsule take 1 capsule by mouth twice daily, 30 days, 5
   
refills  
- Sertraline HCl 50 MG Oral Tablet take 1 tablet by mouth once daily, 30 days, 5
   
refills  
- traZODone HCl 100 MG Oral Tablet take 1 tablet by mouth twice daily, 30 days, 
5   
refills  
  
** Past Medical/Surgical History **  
Reported:  
Has sex without a condom.  
Medical: No previous hospitalizations. Chronic illness and Sexually transmitted   
infection Partners sexually transmitted infection status known.  
Immunization History: Recent immunization for flu.  
Exposure: Exposure to COVID-19.  
Pregnancy: Previously pregnant 1 time(s) and para having 0 live birth(s). Not   
planning a pregnancy in the next year.  
Diagnoses:  
Polycystic Ovarian Syndrome (PCOS).  
Migraine headache.  
Psychiatric disorders biopolar disorder  
Anxiety disorder NOS  
POTS.  
Procedural:  
- Insertion of ear pressure equalization tubes in both ears  
Surgical:  
- Tonsillectomy  
- Tonsillectomy with adenoidectomy  
  
** Social History **  
Environmental Exposure: Secondhand cigarette smoke exposure.  
Behavioral: Not a current tobacco user.  
Tobacco use: Using electronic cigarettes/vaping.  
Alcohol: Not using alcohol.  
Drug Use: Not using drugs denied by patient.  
Sexual: Sexually active age of sexual partner is _____ years old 21, sexual   
orientation Lesbian or David, gender identity Other, and birth control is being   
practiced Not Using - In Same Sex Relationship.  
  
** Allergies **  
- Abilify Reaction: groggy  
- Augmentin Reaction: Shock  
- Metformin Reaction: Nausea, Vomiting  
- NO KNOWN ENVIRONMENTAL ALLERGIES  
- NO KNOWN FOOD ALLERGIES  
- Sertraline Reaction: slow/groggy  
- Trazodone Hydrochloride Reaction: Panic attack  
  
** Family History **  
Paternal:  
Systemic hypertension  
Oncologic disorder  
Maternal:  
Systemic hypertension  
Epilepsy and recurrent seizures  
Psychiatric disorders  
Oncologic disorder  
Fraternal:  
Psychiatric disorders  
  
** Review Of Systems **  
Head: No head symptoms.  
Neck: No neck symptoms.  
Eyes: No eye symptoms.  
Otolaryngeal: No ear symptoms, no nasal symptoms, no nose and sinus finding, no   
throat symptoms, no oral cavity symptoms, and no jaw symptoms.  
Breasts: No breast symptoms.  
Cardiovascular: No cardiovascular symptoms and no chest pain or discomfort.  
Pulmonary: No pulmonary symptoms.  
Gastrointestinal: No gastrointestinal symptoms.  
Genitourinary: No genitourinary symptoms. Obstetrics and gynecology: heavy 
periods.  
Musculoskeletal: No musculoskeletal symptoms.  
Neurological: No neurological symptoms.  
Psychological: No psychological symptoms.  
Skin: No skin symptoms.  
Cardiovascular: Edema not present.  
  
** Physical Findings **  
- Vitals taken 2024 04:39 pm  
BP-Sitting L161/94 mmHg  
BP Cuff SizeLarge  
Pulse Rate-Rapmbth05 bpm  
Kliqbh63 in  
Ifdlvv249 lbs  
Body Mass Index54.6 kg/m2  
Body Surface Area2.4 m2  
Oxygen Dpdocixqbm92 %  
  
- Vitals taken 2024 04:50 pm  
BP-Sitting L137/89 mmHg  
BP Cuff SizeLarge  
Pulse Rate-Ttcgazf17 bpm  
  
Vital Signs:  
- Systolic blood pressure 130 - 139 mmHg.  
- Diastolic blood pressure 80-89 mmHg.  
General Appearance:  
- Awake. - Alert. - Well developed. - Well nourished. - In no acute distress.  
Lungs:  
- Respiration rhythm and depth was normal. - Clear to auscultation.  
Cardiovascular:  
Heart Rate And Rhythm: - Normal.  
Heart Sounds: - Normal.  
Murmurs: - No murmurs were heard.  
Abdomen:  
Visual Inspection: - Abdomen was normal on visual inspection.  
Musculoskeletal System:  
General/bilateral: - Normal movement of all extremities.  
Skin:  
- General appearance was normal.  
  
** Tests **  
Laboratory-based Chemistry:  
Other Laboratory Tests:  
Screening for sexually transmitted infections was not performed.  
  
** Assessment **  
- Z68.43 - Body mass index [BMI] 50.0-59.9, adult  
- D64.9 - Anemia, unspecified  
  
** Counseling/Education **  
- Does not want to stop using current contraception  
- Wishing to stop using electronic cigarettes/vaping  
- Discussed nutritional needs teach healthy choices including fruits and 
vegetables  
- Patient education about a proper diet  
- Not requesting contraception  
- Discussed concerns about exercise: promote physical activity  
  
** Plan **  
StartCited- Anemia, unspecified  
Lab: Iron and TIBC  
Lab: Ferritin  
Lab: CBC With Differential/Platelet  
Lab: TSH+T4F+T3Free  
Lab: Thyroid Antibodies  
EndCited  
StartCited- Other  
Follow-up Appointment  
2 month med check  
EndCited  
# given to [facility] , call michael quirino appt asap . labs asap to further 
check   
on anemia.  
  
** Care Team **  
- Doreen Cabrera CNP  
  
** Health Reminders **  
- Assess BMI satisfied 2024.  
- Assess Tobacco Use satisfied 2024.  
- Follow Up Plan BMI Management satisfied 2024.  
  
** User Defined 1 **  
Not planning a pregnancy in the next year.  
  
  
[facility]06- Progress note*   
  
Progress note  
  
  
  
                                Date            Encounter       Last Documented   
by  
   
                                2024       Established Patient Last docu  
mented on 2024; 10:10 AM,   
Leonie HUTCIHNSON; [facility]  
  
  
  
                                                      
  
  
** Active Problems & Conditions **  
- F90.9 - Attention-deficit Hyperactivity Disorder  
- F31.31 - Bipolar I Disorder, Most Recent Episode, Depressed Mild  
- F43.10 - Post-traumatic Stress Disorder  
  
** Chief Complaint **  
The Chief Complaint is: [facility] met with patient to follow-up regarding mood and PHQ
   
score of 2 and ESTHELA score of 6. Patient reports noticing increased anxiety,   
overthinking and catastrophizing recently. Patient reports in the past, summers 
have   
been difficult and noticed a decline in mood around this time of year. Patient   
reports having several positive things ongoing in life at this time. Patient 
reports   
stressors related to physical health. Patient reports no thoughts of harm toward
 self   
or others.  
  
** History of Present Illness **  
Linnette Beckham is a 24 year old female.  
- Normal appetite - Irritability  
- Anxiety with persistent worry - With anticipation of misfortune to self or 
others -   
Sleep disturbances as patient reports sleeping too much (around 14-16 hours) - 
Energy   
level is fair - Feeling guilty - Racing thoughts - No depression - No loss of   
interest in activities - Not easily distracted - No social isolation - No 
impulsive   
behavior  
  
** Current Medication **  
- ARIPiprazole 5 MG Oral Tablet take 1 tablet by mouth once daily, 30 days, 5 
refills  
- busPIRone HCl 5 MG Oral Tablet take 1 tablet by mouth twice daily, 30 days, 5   
refills  
- Intuniv 1 MG Oral Tablet Extended Release 24 Hour take 1 tablet by mouth once 
daily   
at bedtime, 30 days, 5 refills  
- lamoTRIgine 100 MG Oral Tablet take 1 tablet by mouth once daily, 30 days, 5   
refills  
- MiraLax 17 GM/SCOOP Oral Powder mix 1 scoop with 8 ounces once daily, 30 days,
 2   
refills  
- Narcan 4 MG/0.1ML Nasal Liquid spray in nostril for symptoms of overdose , may
   
repeat in 2 minutes if symptoms persist, 1 days, 0 refills  
- Prazosin HCl 1 MG Oral Capsule take 1 capsule by mouth twice daily, 30 days, 5
   
refills  
- Sertraline HCl 50 MG Oral Tablet take 1 tablet by mouth once daily, 30 days, 5
   
refills  
- traZODone HCl 100 MG Oral Tablet take 1 tablet by mouth twice daily, 30 days, 
5   
refills  
  
** Social History **  
Medical: Chronic illness with POTS and PCOS.  
Environmental Exposure: No secondhand cigarette smoke exposure.  
Personal: Family problems and recent emotional stress.  
Behavioral: Not a current tobacco user.  
Tobacco use: Using electronic cigarettes/vaping.  
Alcohol: Not using alcohol.  
Drug Use: Not using drugs.  
  
** Physical Findings **  
General Appearance:  
- Normal Appearance.  
Neurological:  
- Estimated intelligence was normal. - Oriented to time, place, and person. - No
   
hallucinations. - Judgement was not impaired.  
Speech: - Is Normal.  
Psychiatric:  
- Mood is Euthymic. - Attitude Open.  
Demonstrated Behavior: - Motor Activity Normal Activity. - Eye Contact 
Appropriate.  
Affect: - Congruent with the mood.  
Thought Content: - Insight was intact. - No delusions. - No suicidal ideation. -
 No   
Passive thoughts of Death. - No suicidal plans. - No suicidal intent. - No 
homicidal   
ideations. - No homicidal plans. - No homicidal intent.  
Past Medical:  
- No repetitive self injurious behavior.  
  
** Assessment **  
- F90.9 - Attention-deficit hyperactivity disorder, unspecified type  
- F31.31 - Bipolar disorder, current episode depressed, mild  
- F43.10 - Post-traumatic stress disorder, unspecified  
  
** Therapy **  
- Developmental/Behavioral Screening & Testing - PHQ9 and 
Developmental/Behavioral   
Screening & Testing - GAD7.  
- Brief solution-focused therapy.  
- Adherent with medications.  
-  Visit 30 Minutes.  
- Plan - do not modify medication at this time. Patient would like to see if   
addressing physical health concerns helps before changing medications. 
Collaborated   
with patient and provider:  
  
** Counseling/Education **  
BHP offered active and supportive listening and processed current stressors.  
BHP discussed coping skills and supports that can be implemented to manage 
increased   
anxiety and stressors. BHP discussed potential of resuming counseling, even if 
for   
monthly visits to process ongoing stressors.  
BHP encouraged patient to follow-up on referrals as discussed with PCP.  
  
** Plan **  
P to attempt to follow-up with patient via phone in 2 weeks and at next in 
person   
visit as scheduled.  
  
** Care Team **  
- Doreen Cabrera CNP  
  
** Health Reminders **  
- Assess Tobacco Use satisfied 2024.  
- ESTHELA-2 satisfied 2024.  
- PHQ9 / PHQA satisfied 2024.  
  
** User Defined 1 **  
ESTHELA-2 score was three 2024, ESTHELA-7 score was six [ESTHELA-7] Feeling nervous, 
anxious   
or on edge? + 2 pt : More than half the days, [ESTHELA-7] Feeling nervous, anxious 
or on   
edge? + 2 pt : More than half the days, [ESTHELA-7] Not being able to stop or 
control   
worrying? + 1 pt : Several days, [ESTHELA-7] Not being able to stop or control 
worrying?   
+ 1 pt : Several days, [ESTHELA-7] Worrying too much about different things? + 1 pt 
:   
Several days, [ESTHELA-7] Trouble relaxing? + 0 pt : Not at all, [ESTHELA-7] Being so   
restless that it's hard to sit still? + 0 pt : Not at all, [ESTHELA-7] Becoming 
easily   
annoyed or irritable? + 1 pt : Several days, [ESTHELA-7] Feeling afraid as if 
something   
awful might happen? + 1 pt : Several days, Patient Health Questionnaire 9-Item 
total   
score was two 2024 If you checked off problems, how difficult is it for you
to   
do your work? + : Somewhat difficult, [PHQ-9-1] Little interest or pleasure in 
doing   
things? + 0 pt : Not at all, [PHQ-9-2] Feeling down, depressed, or hopeless? + 0
 pt :   
Not at all, [PHQ-9-3] Trouble falling or staying asleep or sleeping too much? + 
1 pt   
: Several days, [PHQ-9-4] Feeling tired or having little energy? + 0 pt : Not at
 all,   
[PHQ-9-5] Poor appetite or overeating? + 0 pt : Not at all, [PHQ-9-6] Feeling 
bad   
about yourself-or that you are a failure + 1 pt : Several days, [PHQ-9-7] 
Trouble   
concentrating on things such as reading the newspaper + 0 pt : Not at all, 
[PHQ-9-8]   
Moving or speaking so slowly that other people have noticed. + 0 pt : Not at 
all,   
[PHQ-9-9] Thoughts that you would be better off dead or hurting yourself? + 0 pt
 :   
Not at all, and ESTHELA If you checked off problems, how difficult is it for you to 
do   
your work, take care of things, or get along? Somewhat difficult.  
  
  
[facility]04- Progress note*   
  
Progress note  
  
  
  
                                Date            Encounter       Last Documented   
by  
   
                                2024      Chart Update    Last documented   
on 2024; 9:32 AM, Doreen Cabrera CNP;   
[facility]  
  
  
  
                                                      
  
  
** Active Problems & Conditions **  
- F90.9 - Attention-deficit Hyperactivity Disorder  
- F31.31 - Bipolar I Disorder, Most Recent Episode, Depressed Mild  
- F43.10 - Post-traumatic Stress Disorder  
  
** Current Medication **  
- ARIPiprazole 5 MG Oral Tablet take 1 tablet by mouth once daily, 30 days, 5 
refills  
- busPIRone HCl 5 MG Oral Tablet take 1 tablet by mouth twice daily, 30 days, 5   
refills  
- Intuniv 1 MG Oral Tablet Extended Release 24 Hour take 1 tablet by mouth once 
daily   
at bedtime, 30 days, 5 refills  
- lamoTRIgine 100 MG Oral Tablet take 1 tablet by mouth once daily, 30 days, 5   
refills  
- metFORMIN HCl 500 MG Oral Tablet AM and PM per GYN/NOMS in [place], 30 days, 0
   
refills  
- MiraLax 17 GM/SCOOP Oral Powder mix 1 scoop with 8 ounces once daily, 30 days,
 2   
refills  
- Narcan 4 MG/0.1ML Nasal Liquid spray in nostril for symptoms of overdose , may
   
repeat in 2 minutes if symptoms persist, 1 days, 0 refills  
- Prazosin HCl 1 MG Oral Capsule take 1 capsule by mouth twice daily, 30 days, 5
   
refills  
- Sertraline HCl 50 MG Oral Tablet take 1 tablet by mouth once daily, 30 days, 5
   
refills  
- traZODone HCl 100 MG Oral Tablet take 1 tablet by mouth twice daily, 30 days, 
5   
refills  
  
** Past Medical/Surgical History **  
Reported:  
Has sex without a condom.  
Medical: No previous hospitalizations. Chronic illness and Sexually transmitted   
infection Partners sexually transmitted infection status known.  
Immunization History: Recent immunization for flu.  
Exposure: Exposure to COVID-19.  
Pregnancy: Previously pregnant 1 time(s) and para having 0 live birth(s). Not   
planning a pregnancy in the next year.  
Diagnoses:  
Polycystic Ovarian Syndrome (PCOS).  
Migraine headache.  
Psychiatric disorders biopolar disorder  
Anxiety disorder NOS  
Procedural:  
- Insertion of ear pressure equalization tubes in both ears  
Surgical:  
- Tonsillectomy  
- Tonsillectomy with adenoidectomy  
  
** Allergies **  
- Abilify Reaction: groggy  
- Augmentin Reaction: Shock  
- metformin Reaction: Nausea, Vomiting  
- trazodone Reaction: Panic attack  
- Zoloft Reaction: slow/ groggy  
  
** Family History **  
Paternal:  
Systemic hypertension  
Oncologic disorder  
Maternal:  
Systemic hypertension  
Epilepsy and recurrent seizures  
Psychiatric disorders  
Oncologic disorder  
Fraternal:  
Psychiatric disorders  
  
** Tests **  
Physician's Services:  
- Outside Chlamydia Test was Negative 3/27/2024  
  
** Care Team **  
- Doreen Cabrera CNP  
  
  
[facility]03- Evaluation note  
  
Includes: Assessments for all patient encounters  
  
  
  
                                Findings        Encounter       Date  
   
                                                    Bipolar affective disorder,   
current   
episode depressed, mild                  Established Patient with Leonie   
Short LISWS                             2024  
  
  
  
                                                    Last Documented On   
4 7:46PM ; [facility]  
  
  
  
                                        Post-traumatic stress disorder  Establ  
ished Patient with Leonie Short   
LISWS                                   2024  
  
  
  
                                                    Last Documented On   
4 7:46PM ; [facility]  
  
  
  
                                                    Undifferentiated attention d  
eficit   
disorder                                 Established Patient with   
Leonie Short LISWS                     2024  
  
  
  
                                                    Last Documented On   
4 7:46PM ; [facility]  
  
  
  
                                        Visit for: screening for disorder BH Est  
ablished Patient with Leonie   
Short LISWS                             2024  
  
  
  
                                                    Last Documented On   
4 7:46PM ; [facility]  
  
  
  
                                                    [Z68.43 - Body mass index [B  
MI]   
50.0-59.9, adult] assessment of body   
mass index                              Medical Established Patient with   
Doreen Cabrera CNP                        2024  
  
  
  
                                                    Last Documented On   
4 7:50PM ; [facility]  
  
  
  
                                        Attention-deficit hyperactivity disorder  
 Medical Established Patient with   
Doreen Cabrera CNP                        2024  
  
  
  
                                                    Last Documented On   
4 7:50PM ; [facility]  
  
  
  
                                                    Diabetes Risk Test Score was  
 three   
score 3/27/2024                         Medical Established Patient with Doreen Cabrera CNP                              2024  
  
  
  
                                                    Last Documented On   
4 7:50PM ; [facility]  
  
  
  
                                        Visit for: screening for STD Medical Est  
ablished Patient with Doreen Cabrera   
CNP                                     2024  
  
  
  
                                                    Last Documented On   
4 7:50PM ; [facility]  
  
  
  
                                                    [Z68.32 - Body mass index [B  
MI]   
32.0-32.9, adult] assessment of body   
mass index                              Medical Established Patient with   
Doreen Cabrera CNP                        2023  
  
  
  
                                                    Last Documented On   
3 6:01PM ; [facility]  
  
  
  
                                        Borderline personality disorder Medical   
Established Patient with Doreen Cabrera CNP                              2023  
  
  
  
                                                    Last Documented On   
3 6:01PM ; [facility]  
  
  
  
                                        Screening for diabetes mellitus Medical   
Established Patient with Doreen Cabrera CNP                              2023  
  
  
  
                                                    Last Documented On   
3 6:01PM ; [facility]  
  
  
  
                                        Assessment of body mass index Medical Es  
tablished Patient with Doreen Cabrera CNP                              10/21/2022  
  
  
  
                                                    Last Documented On 10/21/202  
2 2:45PM ; [facility]  
  
  
  
                                                    Bipolar affective disorder,   
current   
episode manic                            [facility]                        [facility]  
  
  
  
                                                    Last Documented On   
2 3:22PM ; [facility]  
  
  
  
                                                    Bipolar affective disorder,   
current   
episode manic                           BH Established Patient with Haylie Aguilar [facility]                        2022  
  
  
  
                                                    Last Documented On   
2 2:28PM ; [facility]  
  
  
  
                                                    Bipolar affective disorder,   
current   
episode depressed, severe with   
psychosis                                Tele[facility] Health with Haylienicanor Aguilar [facility]                        2022  
  
  
  
                                                    Last Documented On   
2 3:29PM ; [facility]  
  
  
  
                                                    Assessment of body mass inde  
x [Body   
mass index [BMI] 50.0-59.9, adult]      Open Access - Established with Julieth   
Aliya CNP                               2022  
  
  
  
                                                    Last Documented On   
2 9:36AM ; [facility]  
  
  
  
                                                    Bipolar I disorder, most rec  
ent   
episode, manic                          Open Access - Established with Julieth   
Aliya CNP                               2022  
  
  
  
                                                    Last Documented On   
2 9:36AM ; [facility]  
  
  
  
                                        Post-traumatic stress disorder  Establ  
ished Patient with Haylienicanor Aguilar   
LP[facility]S                                  2022  
  
  
  
                                                    Last Documented On   
2 3:20PM ; [facility]  
  
  
  
                                No cough        Medical Established Patient with  
 Doreen Deborah CNP 2022  
  
  
  
                                                    Last Documented On   
2 4:04PM ; [facility]  
  
  
  
                                                    Z68.43 - Body mass index [BM  
I]   
50.0-59.9, adult                        Medical Established Patient with   
Doreen Deborah CNP                        2022  
  
  
  
                                                    Last Documented On   
2 4:04PM ; [facility]  
  
  
  
                                                    Borderline personality disor  
danny Pt   
reported hx of sx/dx                     Established Patient with Haylienicanor Aguilar [facility]                        2022  
  
  
  
                                                    Last Documented On   
2 4:08PM ; [facility]  
  
  
  
                                                    Assessment of body mass inde  
x [Body   
mass index [BMI] 50.0-59.9, adult]      Open Access - Established with Julieth   
Aliya CNP                               2022  
  
  
  
                                                    Last Documented On   
2 7:41PM ; [facility]  
  
  
  
                                                    Diabetes Risk Test Score was  
 three   
score 2022                         Open Access - Established with Julieth   
Aliya CNP                               2022  
  
  
  
                                                    Last Documented On   
2 7:41PM ; [facility]  
  
  
  
                                                    Bipolar I disorder, most rec  
ent   
episode, manic                           Established Patient with Avis Felder [facility]                          2021  
  
  
  
                                                    Last Documented On   
1 1:35AM ; [facility]  
  
  
  
                                        Borderline personality disorder  Estab  
lished Patient with Avis   
Felder LP[facility]S                          2021  
  
  
  
                                                    Last Documented On   
1 1:35AM ; [facility]  
  
  
  
                                        Post-traumatic stress disorder  Establ  
ished Patient with Avis   
Felder LP[facility]-S                          2021  
  
  
  
                                                    Last Documented On   
1 1:35AM ; [facility]  
  
  
  
                                                    Assessment of visit for: bhargavi guevaraning for   
human immunodeficiency virus            Medical Established Patient with   
Doreen Deborah CNP                        2021  
  
  
  
                                                    Last Documented On   
1 2:56PM ; [facility]  
  
  
  
                                Nicotine dependence Medical Established Patient   
with Doreen Deborah CNP 2021  
  
  
  
                                                    Last Documented On   
1 2:56PM ; [facility]  
  
  
  
                                Tachycardia     Medical Established Patient with  
 Doreen Deborah CNP 2021  
  
  
  
                                                    Last Documented On   
1 2:56PM ; [facility]  
  
  
  
                                                    Z68.43 - Body mass index [BM  
I]   
50.0-59.9, adult                        Medical Established Patient with   
Doreen Deborah CNP                        2021  
  
  
  
                                                    Last Documented On   
1 2:56PM ; [facility]  
  
  
  
                                                    Bipolar I disorder, most rec  
ent   
episode, manic                           Established Patient with Leonie   
Short L[facility]WS                             2021  
  
  
  
                                                    Last Documented On   
1 10:17AM ; [facility]  
  
  
  
                                                    Borderline personality disor  
danny per   
patient reported history                 Established Patient with Leonie   
Short L[facility]WS                             2021  
  
  
  
                                                    Last Documented On   
1 10:17AM ; [facility]  
  
  
  
                                Nicotine dependence  Established Patient with   
Leonie Short LISWS 2021  
  
  
  
                                                    Last Documented On   
1 10:17AM ; [facility]  
  
  
  
                                        Post-traumatic stress disorder  Establ  
ished Patient with Leonie Short   
LISWS                                   2021  
  
  
  
                                                    Last Documented On   
1 10:17AM ; [facility]  
  
  
  
                                Body mass index Medical Established Patient with  
 Doreen Deborah CNP 2021  
  
  
  
                                                    Last Documented On   
1 5:23PM ; [facility]  
  
  
  
                                Morbid obesity  Medical Established Patient with  
 Doreen Deborah CNP 2021  
  
  
  
                                                    Last Documented On   
1 5:23PM ; [facility]  
  
  
  
                                        Nicotine dependence uncomplicated Medica  
l Established Patient with Doreen   
Deborah CNP                              2021  
  
  
  
                                                    Last Documented On   
1 5:23PM ; [facility]  
  
  
  
                                                    Z68.42 - Body mass index [BM  
I]   
45.0-49.9, adult                        Medical Established Patient with   
Doreen Deborah CNP                        2021  
  
  
  
                                                    Last Documented On   
1 5:23PM ; [facility]  
  
  
  
                                Episodic mood disorders On Call with Pamela Velezammy edwards CNP 2021  
  
  
  
                                                    Last Documented On   
1 7:49AM ; [facility]  
  
  
  
                                                    Mood disorders, NOS per tabatha  
ent   
reported history                         Established Patient with Leonie   
Short L[facility]WS                             2021  
  
  
  
                                                    Last Documented On   
1 7:15PM ; [facility]  
  
  
  
                                        Post-traumatic stress disorder  Establ  
ished Patient with Leonie Short   
LISWS                                   2021  
  
  
  
                                                    Last Documented On   
1 7:15PM ; [facility]  
  
  
  
                                Morbid obesity  Medical Established Patient with  
 Doreen Deborah CNP 2021  
  
  
  
                                                    Last Documented On   
1 12:31PM ; [facility]  
  
  
  
                                Otitis externa  Medical Established Patient with  
 Doreen Deborah CNP 2021  
  
  
  
                                                    Last Documented On   
1 12:31PM ; [facility]  
  
  
  
                                                    Z68.42 - Body mass index [BM  
I]   
45.0-49.9, adult                        Medical Established Patient with   
Doreen Deborah CNP                        2021  
  
  
  
                                                    Last Documented On   
1 12:31PM ; [facility]  
  
  
  
                                        Post-traumatic stress disorder  Establ  
ished Patient with Leonie Short   
LISWS                                   06/10/2021  
  
  
  
                                                    Last Documented On 06/10/202  
1 10:05PM ; [facility]  
  
  
  
                                Morbid obesity  Medical Established Patient with  
 Doreen Deborah CNP 06/10/2021  
  
  
  
                                                    Last Documented On 06/10/202  
1 4:45PM ; [facility]  
  
  
  
                                                    Z68.42 - Body mass index [BM  
I]   
45.0-49.9, adult                        Medical Established Patient with   
Doreen Deborah CNP                        06/10/2021  
  
  
  
                                                    Last Documented On 06/10/202  
1 4:45PM ; [facility]  
  
  
  
                                        Post-traumatic stress disorder  Establ  
ished Patient with Leonie Short   
LISWS                                   2021  
  
  
  
                                                    Last Documented On   
1 11:59AM ; [facility]  
  
  
  
                                                    Assessment of visit for: scr  
eening for   
human immunodeficiency virus            Medical New Patient with Doreen Cabrera CNP                              2021  
  
  
  
                                                    Last Documented On   
1 4:06PM ; [facility]  
  
  
  
                                                    Diabetes Risk Test Score was  
 one score   
2021                               Medical New Patient with Doreen Cabrera CNP                              2021  
  
  
  
                                                    Last Documented On   
1 4:06PM ; [facility]  
  
  
  
                                Hypertension    Medical New Patient with Doreenpaola esposito CNP 2021  
  
  
  
                                                    Last Documented On   
1 4:06PM ; [facility]  
  
  
  
                                Morbid obesity  Medical New Patient with Doreen DAVID esposito CNP 2021  
  
  
  
                                                    Last Documented On   
1 4:06PM ; [facility]  
  
  
  
                                Post-traumatic stress disorder Medical New Patie  
nt with Doreen Cabrera CNP   
2021  
  
  
  
                                                    Last Documented On   
1 4:06PM ; [facility]  
  
  
  
                                                    Z68.42 - Body mass index [BM  
I]   
45.0-49.9, adult                        Medical New Patient with Doreen Cabrera CNP                              2021  
  
  
  
                                                    Last Documented On   
1 4:06PM ; [facility]  
Work Phone: [phone]- Evaluation note  
  
Includes: Assessments for all patient encounters  
  
  
  
                                Findings        Encounter       Date  
   
                                                    Bipolar affective disorder,   
current   
episode depressed, mild                  Established Patient with Leonie   
Short LISWS                             2024  
  
  
  
                                                    Last Documented On   
4 5:16PM ; [facility]  
  
  
  
                                        Post-traumatic stress disorder  Establ  
ished Patient with Leonie Short   
LISWS                                   2024  
  
  
  
                                                    Last Documented On   
4 5:16PM ; [facility]  
  
  
  
                                                    Undifferentiated attention d  
eficit   
disorder                                 Established Patient with   
Leonie Short LISWS                     2024  
  
  
  
                                                    Last Documented On   
4 5:16PM ; [facility]  
  
  
  
                                        Visit for: screening for disorder  Est  
ablished Patient with Leonie   
Short LISWS                             2024  
  
  
  
                                                    Last Documented On   
4 5:16PM ; [facility]  
  
  
  
                                                    [Z68.43 - Body mass index [B  
MI]   
50.0-59.9, adult] assessment of body   
mass index                              Medical Established Patient with   
Doreen Cabrera CNP                        2024  
  
  
  
                                                    Last Documented On   
4 7:50PM ; [facility]  
  
  
  
                                        Attention-deficit hyperactivity disorder  
 Medical Established Patient with   
Doreen Cabrera CNP                        2024  
  
  
  
                                                    Last Documented On   
4 7:50PM ; [facility]  
  
  
  
                                                    Diabetes Risk Test Score was  
 three   
score 3/27/2024                         Medical Established Patient with Doreen Cabrera CNP                              2024  
  
  
  
                                                    Last Documented On   
4 7:50PM ; [facility]  
  
  
  
                                        Visit for: screening for STD Medical Est  
ablished Patient with Doreen Cabrera   
CNP                                     2024  
  
  
  
                                                    Last Documented On   
4 7:50PM ; [facility]  
  
  
  
                                                    [Z68.32 - Body mass index [B  
MI]   
32.0-32.9, adult] assessment of body   
mass index                              Medical Established Patient with   
Doreen Cabrera CNP                        2023  
  
  
  
                                                    Last Documented On   
3 6:01PM ; [facility]  
  
  
  
                                        Borderline personality disorder Medical   
Established Patient with Doreen Cabrera CNP                              2023  
  
  
  
                                                    Last Documented On   
3 6:01PM ; [facility]  
  
  
  
                                        Screening for diabetes mellitus Medical   
Established Patient with Doreen Cabrera CNP                              2023  
  
  
  
                                                    Last Documented On   
3 6:01PM ; [facility]  
  
  
  
                                        Assessment of body mass index Medical Es  
tablished Patient with Doreen Cabrera CNP                              10/21/2022  
  
  
  
                                                    Last Documented On 10/21/202  
2 2:45PM ; [facility]  
  
  
  
                                                    Bipolar affective disorder,   
current   
episode manic                            Telebehavioral Health with Haylienicanor Aguilar LPCC-S                        2022  
  
  
  
                                                    Last Documented On   
2 3:22PM ; [facility]  
  
  
  
                                                    Bipolar affective disorder,   
current   
episode manic                            Established Patient with Haylienicanor Martinerson LPCC-S                        2022  
  
  
  
                                                    Last Documented On   
2 2:28PM ; [facility]  
  
  
  
                                                    Bipolar affective disorder,   
current   
episode depressed, severe with   
psychosis                                Telebehavioral Health with Haylienicanor Aguilar LPCC-S                        2022  
  
  
  
                                                    Last Documented On   
2 3:29PM ; [facility]  
  
  
  
                                                    Assessment of body mass inde  
x [Body   
mass index [BMI] 50.0-59.9, adult]      Open Access - Established with Julieth   
Aliya CNP                               2022  
  
  
  
                                                    Last Documented On   
2 9:36AM ; [facility]  
  
  
  
                                                    Bipolar I disorder, most rec  
ent   
episode, manic                          Open Access - Established with Julieth   
Aliya CNP                               2022  
  
  
  
                                                    Last Documented On   
2 9:36AM ; [facility]  
  
  
  
                                        Post-traumatic stress disorder BH Establ  
ished Patient with Haylie Aguilar   
LPCC-S                                  2022  
  
  
  
                                                    Last Documented On   
2 3:20PM ; [facility]  
  
  
  
                                No cough        Medical Established Patient with  
 Doreen Deborah CNP 2022  
  
  
  
                                                    Last Documented On   
2 4:04PM ; [facility]  
  
  
  
                                                    Z68.43 - Body mass index [BM  
I]   
50.0-59.9, adult                        Medical Established Patient with   
Doreen Deborah CNP                        2022  
  
  
  
                                                    Last Documented On   
2 4:04PM ; [facility]  
  
  
  
                                                    Borderline personality disor  
danny Pt   
reported hx of sx/dx                    BH Established Patient with Haylie   
Aguilar LPCC-S                        2022  
  
  
  
                                                    Last Documented On   
2 4:08PM ; [facility]  
  
  
  
                                                    Assessment of body mass inde  
x [Body   
mass index [BMI] 50.0-59.9, adult]      Open Access - Established with Julieth Pisano CNP                               2022  
  
  
  
                                                    Last Documented On   
2 7:41PM ; [facility]  
  
  
  
                                                    Diabetes Risk Test Score was  
 three   
score 2022                         Open Access - Established with Julieth   
Aliya CNP                               2022  
  
  
  
                                                    Last Documented On   
2 7:41PM ; [facility]  
  
  
  
                                                    Bipolar I disorder, most rec  
ent   
episode, manic                          BH Established Patient with Avis   
Felder LPCC-S                          2021  
  
  
  
                                                    Last Documented On   
1 1:35AM ; [facility]  
  
  
  
                                        Borderline personality disorder BH Estab  
lished Patient with Avis   
Felder LPCC-S                          2021  
  
  
  
                                                    Last Documented On   
1 1:35AM ; [facility]  
  
  
  
                                        Post-traumatic stress disorder  Establ  
ished Patient with Avis Felder LPCC-S                          2021  
  
  
  
                                                    Last Documented On   
1 1:35AM ; [facility]  
  
  
  
                                                    Assessment of visit for: scr  
eening for   
human immunodeficiency virus            Medical Established Patient with   
Doreen Deborah CNP                        2021  
  
  
  
                                                    Last Documented On   
1 2:56PM ; [facility]  
  
  
  
                                Nicotine dependence Medical Established Patient   
with Doreen Deborah CNP 2021  
  
  
  
                                                    Last Documented On   
1 2:56PM ; [facility]  
  
  
  
                                Tachycardia     Medical Established Patient with  
 Doreen Deborah CNP 2021  
  
  
  
                                                    Last Documented On   
1 2:56PM ; [facility]  
  
  
  
                                                    Z68.43 - Body mass index [BM  
I]   
50.0-59.9, adult                        Medical Established Patient with   
Doreen Deborah CNP                        2021  
  
  
  
                                                    Last Documented On   
1 2:56PM ; [facility]  
  
  
  
                                                    Bipolar I disorder, most rec  
ent   
episode, manic                           Established Patient with Leonie   
Short LISWS                             2021  
  
  
  
                                                    Last Documented On   
1 10:17AM ; [facility]  
  
  
  
                                                    Borderline personality disor  
danny per   
patient reported history                 Established Patient with Leonie   
Short LISWS                             2021  
  
  
  
                                                    Last Documented On   
1 10:17AM ; [facility]  
  
  
  
                                Nicotine dependence  Established Patient with   
Leonie Short LISWS 2021  
  
  
  
                                                    Last Documented On   
1 10:17AM ; [facility]  
  
  
  
                                        Post-traumatic stress disorder  Establ  
ished Patient with Leonie Short   
LISWS                                   2021  
  
  
  
                                                    Last Documented On   
1 10:17AM ; [facility]  
  
  
  
                                Body mass index Medical Established Patient with  
 Doreen Deborah CNP 2021  
  
  
  
                                                    Last Documented On   
1 5:23PM ; [facility]  
  
  
  
                                Morbid obesity  Medical Established Patient with  
 Doreen Deborah CNP 2021  
  
  
  
                                                    Last Documented On   
1 5:23PM ; [facility]  
  
  
  
                                        Nicotine dependence uncomplicated Medica  
l Established Patient with Doreen   
Deborah CNP                              2021  
  
  
  
                                                    Last Documented On   
1 5:23PM ; [facility]  
  
  
  
                                                    Z68.42 - Body mass index [BM  
I]   
45.0-49.9, adult                        Medical Established Patient with   
Doreen Deborah CNP                        2021  
  
  
  
                                                    Last Documented On   
1 5:23PM ; [facility]  
  
  
  
                                Episodic mood disorders On Call with Pamela edwards CNP 2021  
  
  
  
                                                    Last Documented On   
1 7:49AM ; [facility]  
  
  
  
                                                    Mood disorders, NOS per tabatha  
ent   
reported history                         Established Patient with Leonie   
Short LISWS                             2021  
  
  
  
                                                    Last Documented On   
1 7:15PM ; [facility]  
  
  
  
                                        Post-traumatic stress disorder  Establ  
ished Patient with Leonie Short   
LISWS                                   2021  
  
  
  
                                                    Last Documented On   
1 7:15PM ; [facility]  
  
  
  
                                Morbid obesity  Medical Established Patient with  
 Doreen Deborah CNP 2021  
  
  
  
                                                    Last Documented On   
1 12:31PM ; [facility]  
  
  
  
                                Otitis externa  Medical Established Patient with  
 Doreen Deborah CNP 2021  
  
  
  
                                                    Last Documented On   
1 12:31PM ; [facility]  
  
  
  
                                                    Z68.42 - Body mass index [BM  
I]   
45.0-49.9, adult                        Medical Established Patient with   
Doreen Deborah CNP                        2021  
  
  
  
                                                    Last Documented On   
1 12:31PM ; [facility]  
  
  
  
                                        Post-traumatic stress disorder  Establ  
ished Patient with Leonie Short   
LISWS                                   06/10/2021  
  
  
  
                                                    Last Documented On 06/10/202  
1 10:05PM ; [facility]  
  
  
  
                                Morbid obesity  Medical Established Patient with  
 Doreen Deborah CNP 06/10/2021  
  
  
  
                                                    Last Documented On 06/10/202  
1 4:45PM ; [facility]  
  
  
  
                                                    Z68.42 - Body mass index [BM  
I]   
45.0-49.9, adult                        Medical Established Patient with   
Doreen Deborah CNP                        06/10/2021  
  
  
  
                                                    Last Documented On 06/10/202  
1 4:45PM ; [facility]  
  
  
  
                                        Post-traumatic stress disorder  Establ  
ished Patient with Leonie Short   
LISWS                                   2021  
  
  
  
                                                    Last Documented On   
1 11:59AM ; [facility]  
  
  
  
                                                    Assessment of visit for: scr  
eening for   
human immunodeficiency virus            Medical New Patient with Doreen Cabrera CNP                              2021  
  
  
  
                                                    Last Documented On   
1 4:06PM ; [facility]  
  
  
  
                                                    Diabetes Risk Test Score was  
 one score   
2021                               Medical New Patient with Doreen Cabrera CNP                              2021  
  
  
  
                                                    Last Documented On   
1 4:06PM ; [facility]  
  
  
  
                                Hypertension    Medical New Patient with Doreen esposito CNP 2021  
  
  
  
                                                    Last Documented On   
1 4:06PM ; [facility]  
  
  
  
                                Morbid obesity  Medical New Patient with Doreen DAVID esposito CNP 2021  
  
  
  
                                                    Last Documented On   
1 4:06PM ; [facility]  
  
  
  
                                Post-traumatic stress disorder Medical New Patie  
nt with Doreen Cabrera CNP   
2021  
  
  
  
                                                    Last Documented On   
1 4:06PM ; [facility]  
  
  
  
                                                    Z68.42 - Body mass index [BM  
I]   
45.0-49.9, adult                        Medical New Patient with Doreen Cabrera CNP                              2021  
  
  
  
                                                    Last Documented On   
1 4:06PM ; [facility]  
Work Phone: [phone]- Evaluation note  
  
Includes: Assessments for all patient encounters  
  
  
  
                                Findings        Encounter       Date  
   
                                                    Bipolar affective disorder,   
current   
episode depressed, mild                  Established Patient with Leonie   
Short LISWS                             2024  
  
  
  
                                                    Last Documented On   
4 5:16PM ; [facility]  
  
  
  
                                        Post-traumatic stress disorder BH Establ  
ished Patient with Leonie Short   
LISWS                                   2024  
  
  
  
                                                    Last Documented On   
4 5:16PM ; [facility]  
  
  
  
                                                    Undifferentiated attention d  
eficit   
disorder                                 Established Patient with   
Leonie Short LISWS                     2024  
  
  
  
                                                    Last Documented On   
4 5:16PM ; [facility]  
  
  
  
                                        Visit for: screening for disorder BH Est  
ablished Patient with Leonie   
Short LISWS                             2024  
  
  
  
                                                    Last Documented On   
4 5:16PM ; [facility]  
  
  
  
                                                    [Z68.43 - Body mass index [B  
MI]   
50.0-59.9, adult] assessment of body   
mass index                              Medical Established Patient with   
Doreenpaola Cabrera CNP                        2024  
  
  
  
                                                    Last Documented On   
4 7:50PM ; [facility]  
  
  
  
                                        Attention-deficit hyperactivity disorder  
 Medical Established Patient with   
Doreen Cabrera CNP                        2024  
  
  
  
                                                    Last Documented On   
4 7:50PM ; [facility]  
  
  
  
                                                    Diabetes Risk Test Score was  
 three   
score 3/27/2024                         Medical Established Patient with Doreen Cabrera CNP                              2024  
  
  
  
                                                    Last Documented On   
4 7:50PM ; [facility]  
  
  
  
                                        Visit for: screening for STD Medical Est  
ablished Patient with Doreen Cabrera   
CNP                                     2024  
  
  
  
                                                    Last Documented On   
4 7:50PM ; [facility]  
  
  
  
                                                    [Z68.32 - Body mass index [B  
MI]   
32.0-32.9, adult] assessment of body   
mass index                              Medical Established Patient with   
Doreen Cabrera CNP                        2023  
  
  
  
                                                    Last Documented On   
3 6:01PM ; [facility]  
  
  
  
                                        Borderline personality disorder Medical   
Established Patient with Doreen Cabrera CNP                              2023  
  
  
  
                                                    Last Documented On   
3 6:01PM ; [facility]  
  
  
  
                                        Screening for diabetes mellitus Medical   
Established Patient with Doreen Cabrera CNP                              2023  
  
  
  
                                                    Last Documented On   
3 6:01PM ; [facility]  
  
  
  
                                        Assessment of body mass index Medical Es  
tablished Patient with Doreen Cabrera CNP                              10/21/2022  
  
  
  
                                                    Last Documented On 10/21/202  
2 2:45PM ; [facility]  
  
  
  
                                                    Bipolar affective disorder,   
current   
episode manic                            Telebehavioral Health with Hyalie Aguilar [facility] [facility]                        2022  
  
  
  
                                                    Last Documented On   
2 3:22PM ; [facility]  
  
  
  
                                                    Bipolar affective disorder,   
current   
episode manic                            Established Patient with Haylie Martinerson LPCC-S                        2022  
  
  
  
                                                    Last Documented On   
2 2:28PM ; [facility]  
  
  
  
                                                    Bipolar affective disorder,   
current   
episode depressed, severe with   
psychosis                                Telebehavioral Health with Haylie Martinerson [facility]                        2022  
  
  
  
                                                    Last Documented On   
2 3:29PM ; [facility]  
  
  
  
                                                    Assessment of body mass inde  
x [Body   
mass index [BMI] 50.0-59.9, adult]      Open Access - Established with Julieth   
Aliya CNP                               2022  
  
  
  
                                                    Last Documented On   
2 9:36AM ; [facility]  
  
  
  
                                                    Bipolar I disorder, most rec  
ent   
episode, manic                          Open Access - Established with Julieth   
Aliya CNP                               2022  
  
  
  
                                                    Last Documented On   
2 9:36AM ; [facility]  
  
  
  
                                        Post-traumatic stress disorder BH Establ  
ished Patient with Haylie Aguilar   
LP[facility]                                  2022  
  
  
  
                                                    Last Documented On   
2 3:20PM ; [facility]  
  
  
  
                                No cough        Medical Established Patient with  
 Doreen Deborah CNP 2022  
  
  
  
                                                    Last Documented On   
2 4:04PM ; [facility]  
  
  
  
                                                    Z68.43 - Body mass index [BM  
I]   
50.0-59.9, adult                        Medical Established Patient with   
Doreen Deborah CNP                        2022  
  
  
  
                                                    Last Documented On   
2 4:04PM ; [facility]  
  
  
  
                                                    Borderline personality disor  
danny Pt   
reported hx of sx/dx                    BH Established Patient with Hayliencianor Martinerson LPCC-S                        2022  
  
  
  
                                                    Last Documented On   
2 4:08PM ; [facility]  
  
  
  
                                                    Assessment of body mass inde  
x [Body   
mass index [BMI] 50.0-59.9, adult]      Open Access - Established with Julieth   
Aliya CNP                               2022  
  
  
  
                                                    Last Documented On   
2 7:41PM ; [facility]  
  
  
  
                                                    Diabetes Risk Test Score was  
 three   
score 2022                         Open Access - Established with Julieth   
Aliya CNP                               2022  
  
  
  
                                                    Last Documented On   
2 7:41PM ; [facility]  
  
  
  
                                                    Bipolar I disorder, most rec  
ent   
episode, manic                          BH Established Patient with Avis   
Felder LPCC-S                          2021  
  
  
  
                                                    Last Documented On   
1 1:35AM ; [facility]  
  
  
  
                                        Borderline personality disorder BH Estab  
lished Patient with Avis   
Felder LPCC-S                          2021  
  
  
  
                                                    Last Documented On   
1 1:35AM ; [facility]  
  
  
  
                                        Post-traumatic stress disorder BH Establ  
ished Patient with Avis   
Felder LPCC-S                          2021  
  
  
  
                                                    Last Documented On   
1 1:35AM ; [facility]  
  
  
  
                                                    Assessment of visit for: bhargavi myles for   
human immunodeficiency virus            Medical Established Patient with   
Doreen Deborah CNP                        2021  
  
  
  
                                                    Last Documented On   
1 2:56PM ; [facility]  
  
  
  
                                Nicotine dependence Medical Established Patient   
with Doreen Deborah CNP 2021  
  
  
  
                                                    Last Documented On   
1 2:56PM ; [facility]  
  
  
  
                                Tachycardia     Medical Established Patient with  
 Doreen Deborah CNP 2021  
  
  
  
                                                    Last Documented On   
1 2:56PM ; [facility]  
  
  
  
                                                    Z68.43 - Body mass index [BM  
I]   
50.0-59.9, adult                        Medical Established Patient with   
Doreen Deborah CNP                        2021  
  
  
  
                                                    Last Documented On   
1 2:56PM ; [facility]  
  
  
  
                                                    Bipolar I disorder, most rec  
ent   
episode, manic                           Established Patient with Leonie   
Short L[facility]WS                             2021  
  
  
  
                                                    Last Documented On   
1 10:17AM ; [facility]  
  
  
  
                                                    Borderline personality disor  
danny per   
patient reported history                 Established Patient with Leonie   
Short [facility]WS                             2021  
  
  
  
                                                    Last Documented On   
1 10:17AM ; [facility]  
  
  
  
                                Nicotine dependence  Established Patient with   
Loenie Short LISWS 2021  
  
  
  
                                                    Last Documented On   
1 10:17AM ; [facility]  
  
  
  
                                        Post-traumatic stress disorder BH Establ  
ished Patient with Leonie Short   
LISWS                                   2021  
  
  
  
                                                    Last Documented On   
1 10:17AM ; [facility]  
  
  
  
                                Body mass index Medical Established Patient with  
 Doreen Deborah CNP 2021  
  
  
  
                                                    Last Documented On   
1 5:23PM ; [facility]  
  
  
  
                                Morbid obesity  Medical Established Patient with  
 Doreen Deborah CNP 2021  
  
  
  
                                                    Last Documented On   
1 5:23PM ; [facility]  
  
  
  
                                        Nicotine dependence uncomplicated Medica  
l Established Patient with Doreen   
Deborah CNP                              2021  
  
  
  
                                                    Last Documented On   
1 5:23PM ; [facility]  
  
  
  
                                                    Z68.42 - Body mass index [BM  
I]   
45.0-49.9, adult                        Medical Established Patient with   
Doreen Deborah CNP                        2021  
  
  
  
                                                    Last Documented On   
1 5:23PM ; [facility]  
  
  
  
                                Episodic mood disorders On Call with Pamela edwards CNP 2021  
  
  
  
                                                    Last Documented On   
1 7:49AM ; [facility]  
  
  
  
                                                    Mood disorders, NOS per tabatha  
ent   
reported history                         Established Patient with Leonie   
Short LISWS                             2021  
  
  
  
                                                    Last Documented On   
1 7:15PM ; [facility]  
  
  
  
                                        Post-traumatic stress disorder BH Establ  
ished Patient with Leonie Short   
LISWS                                   2021  
  
  
  
                                                    Last Documented On   
1 7:15PM ; [facility]  
  
  
  
                                Morbid obesity  Medical Established Patient with  
 Doreen Deborah CNP 2021  
  
  
  
                                                    Last Documented On   
1 12:31PM ; [facility]  
  
  
  
                                Otitis externa  Medical Established Patient with  
 Doreen Deborah CNP 2021  
  
  
  
                                                    Last Documented On   
1 12:31PM ; [facility]  
  
  
  
                                                    Z68.42 - Body mass index [BM  
I]   
45.0-49.9, adult                        Medical Established Patient with   
Doreen Deborah CNP                        2021  
  
  
  
                                                    Last Documented On   
1 12:31PM ; [facility]  
  
  
  
                                        Post-traumatic stress disorder  Establ  
ished Patient with Leonie Short   
LISWS                                   06/10/2021  
  
  
  
                                                    Last Documented On 06/10/202  
1 10:05PM ; [facility]  
  
  
  
                                Morbid obesity  Medical Established Patient with  
 Doreen Deborah CNP 06/10/2021  
  
  
  
                                                    Last Documented On 06/10/202  
1 4:45PM ; [facility]  
  
  
  
                                                    Z68.42 - Body mass index [BM  
I]   
45.0-49.9, adult                        Medical Established Patient with   
Doreen Deborah CNP                        06/10/2021  
  
  
  
                                                    Last Documented On 06/10/202  
1 4:45PM ; [facility]  
  
  
  
                                        Post-traumatic stress disorder  Establ  
ished Patient with Leonie Short   
LISWS                                   2021  
  
  
  
                                                    Last Documented On   
1 11:59AM ; [facility]  
  
  
  
                                                    Assessment of visit for: scr  
eening for   
human immunodeficiency virus            Medical New Patient with Doreen   
Deborah CNP                              2021  
  
  
  
                                                    Last Documented On   
1 4:06PM ; [facility]  
  
  
  
                                                    Diabetes Risk Test Score was  
 one score   
2021                               Medical New Patient with Doreen Cabrera CNP                              2021  
  
  
  
                                                    Last Documented On   
1 4:06PM ; [facility]  
  
  
  
                                Hypertension    Medical New Patient with Doreen esposito CNP 2021  
  
  
  
                                                    Last Documented On   
1 4:06PM ; [facility]  
  
  
  
                                Morbid obesity  Medical New Patient with Doreen esposito CNP 2021  
  
  
  
                                                    Last Documented On   
1 4:06PM ; [facility]  
  
  
  
                                Post-traumatic stress disorder Medical New Patie  
nt with Doreen Cabrera CNP   
2021  
  
  
  
                                                    Last Documented On   
1 4:06PM ; [facility]  
  
  
  
                                                    Z68.42 - Body mass index [BM  
I]   
45.0-49.9, adult                        Medical New Patient with Doreen Cabrera CNP                              2021  
  
  
  
                                                    Last Documented On   
1 4:06PM ; [facility]  
Work Phone: [phone]- History general Narrative - Reported  
  
Includes: Medical History in patient's chart  
  
  
  
                                        Description         Last Updated  
   
                                        0 previous live birth(s) 2024  
  
  
  
                                                    Last Documented On   
4 7:50PM ; [facility]  
  
  
  
                                        Previously pregnant 1 time(s) 2024  
  
  
  
                                                    Last Documented On   
4 7:50PM ; [facility]  
  
  
  
                                        Recent immunization for flu 2024  
  
  
  
                                                    Last Documented On   
4 7:50PM ; [facility]  
  
  
  
                                        Has sex without a condom 2022  
  
  
  
                                                    Last Documented On   
2 4:04PM ; [facility]  
  
  
  
                                        Not planning to have a baby in the next   
12 months 2022  
  
  
  
                                                    Last Documented On   
2 4:04PM ; [facility]  
  
  
  
                                        Partners sexually transmitted infection   
status known 2022  
  
  
  
                                                    Last Documented On   
2 4:04PM ; [facility]  
  
  
  
                                        No previous hospitalizations 2022  
  
  
  
                                                    Last Documented On   
2 4:04PM ; [facility]  
  
  
  
                                        Chronic illness     2021  
  
  
  
                                                    Last Documented On   
1 7:49AM ; [facility]  
  
  
  
                                        Exposure to COVID-19 2021  
  
  
  
                                                    Last Documented On   
1 12:31PM ; [facility]  
  
  
  
                                        History of gynecologic disorder 20  
21  
  
  
  
                                                    Last Documented On   
1 4:06PM ; [facility]  
  
  
  
                                        History of Polycystic Ovarian Syndrome (  
PCOS) 2021  
  
  
  
                                                    Last Documented On   
1 4:06PM ; [facility]  
  
  
  
                                        History of anxiety disorder NOS 20  
21  
  
  
  
                                                    Last Documented On   
1 4:06PM ; [facility]  
  
  
  
                                        History of migraine headache 2021  
  
  
  
                                                    Last Documented On   
1 4:06PM ; [facility]  
  
  
  
                                        History of psychiatric disorders biopola  
r disorder 2021  
  
  
  
                                                    Last Documented On   
1 4:06PM ; [facility]  
Work Phone: [phone]- History general Narrative - Reported  
  
Includes: Medical History in patient's chart  
  
  
  
                                        Description         Last Updated  
   
                                        0 previous live birth(s) 2024  
  
  
  
                                                    Last Documented On   
4 7:50PM ; [facility]  
  
  
  
                                        Previously pregnant 1 time(s) 2024  
  
  
  
                                                    Last Documented On   
4 7:50PM ; [facility]  
  
  
  
                                        Recent immunization for flu 2024  
  
  
  
                                                    Last Documented On   
4 7:50PM ; [facility]  
  
  
  
                                        Has sex without a condom 2022  
  
  
  
                                                    Last Documented On   
2 4:04PM ; [facility]  
  
  
  
                                        Not planning to have a baby in the next   
12 months 2022  
  
  
  
                                                    Last Documented On   
2 4:04PM ; [facility]  
  
  
  
                                        Partners sexually transmitted infection   
status known 2022  
  
  
  
                                                    Last Documented On   
2 4:04PM ; [facility]  
  
  
  
                                        No previous hospitalizations 2022  
  
  
  
                                                    Last Documented On   
2 4:04PM ; [facility]  
  
  
  
                                        Chronic illness     2021  
  
  
  
                                                    Last Documented On   
1 7:49AM ; [facility]  
  
  
  
                                        Exposure to COVID-19 2021  
  
  
  
                                                    Last Documented On   
1 12:31PM ; [facility]  
  
  
  
                                        History of gynecologic disorder 20  
21  
  
  
  
                                                    Last Documented On   
1 4:06PM ; [facility]  
  
  
  
                                        History of Polycystic Ovarian Syndrome (  
PCOS) 2021  
  
  
  
                                                    Last Documented On   
1 4:06PM ; [facility]  
  
  
  
                                        History of anxiety disorder NOS 20  
21  
  
  
  
                                                    Last Documented On   
1 4:06PM ; [facility]  
  
  
  
                                        History of migraine headache 2021  
  
  
  
                                                    Last Documented On   
1 4:06PM ; [facility]  
  
  
  
                                        History of psychiatric disorders biopola  
r disorder 2021  
  
  
  
                                                    Last Documented On   
1 4:06PM ; [facility]  
Work Phone: [phone]- History general Narrative - Reported  
  
Includes: Medical History in patient's chart  
  
  
  
                                        Description         Last Updated  
   
                                        0 previous live birth(s) 2024  
  
  
  
                                                    Last Documented On   
4 7:50PM ; [facility]  
  
  
  
                                        Previously pregnant 1 time(s) 2024  
  
  
  
                                                    Last Documented On   
4 7:50PM ; [facility]  
  
  
  
                                        Recent immunization for flu 2024  
  
  
  
                                                    Last Documented On   
4 7:50PM ; [facility]  
  
  
  
                                        Has sex without a condom 2022  
  
  
  
                                                    Last Documented On   
2 4:04PM ; [facility]  
  
  
  
                                        Not planning to have a baby in the next   
12 months 2022  
  
  
  
                                                    Last Documented On   
2 4:04PM ; [facility]  
  
  
  
                                        Partners sexually transmitted infection   
status known 2022  
  
  
  
                                                    Last Documented On   
2 4:04PM ; [facility]  
  
  
  
                                        No previous hospitalizations 2022  
  
  
  
                                                    Last Documented On   
2 4:04PM ; [facility]  
  
  
  
                                        Chronic illness     2021  
  
  
  
                                                    Last Documented On   
1 7:49AM ; [facility]  
  
  
  
                                        Exposure to COVID-19 2021  
  
  
  
                                                    Last Documented On   
1 12:31PM ; [facility]  
  
  
  
                                        History of gynecologic disorder 20  
21  
  
  
  
                                                    Last Documented On   
1 4:06PM ; [facility]  
  
  
  
                                        History of Polycystic Ovarian Syndrome (  
PCOS) 2021  
  
  
  
                                                    Last Documented On   
1 4:06PM ; [facility]  
  
  
  
                                        History of anxiety disorder NOS 20  
21  
  
  
  
                                                    Last Documented On   
1 4:06PM ; [facility]  
  
  
  
                                        History of migraine headache 2021  
  
  
  
                                                    Last Documented On   
1 4:06PM ; [facility]  
  
  
  
                                        History of psychiatric disorders biopola  
r disorder 2021  
  
  
  
                                                    Last Documented On   
1 4:06PM ; [facility]  
Work Phone: [phone]- Progress note*   
  
Progress note  
  
  
  
                                Date            Encounter       Last Documented   
by  
   
                                2024      Medical Established Patient Last  
 documented on 2024; 7:50 PM,   
Doreen Cabrera CNP; [facility]  
  
  
  
                                                      
  
  
** Active Problems & Conditions **  
- F90.9 - Attention-deficit Hyperactivity Disorder  
- F31.31 - Bipolar I Disorder, Most Recent Episode, Depressed Mild  
- F43.10 - Post-traumatic Stress Disorder  
  
** Chief Complaint **  
The Chief Complaint is: Patient was D/C'd from [facility] in 
[place]   
for no call no show. Patient needs all meds refilled. Ran out 3 days ago.  
  
** Referred Here **  
No prior encounters.  
  
** History of Present Illness **  
Linnette Beckham is a 23 year old female.  
- Allergy list reviewed - Reviewed Medications - Medication list reviewed  
- Date of last menstruation 2024 - Pregnancy test - would not like a 
pregnancy   
test  
Presents to see if we can take over psych meds , got dismissed from [facility] r/t no 
shows.   
has felt stable on meds x 11 months  
  
** Current Medication **  
- ARIPiprazole 5 MG Oral Tablet once daily, 90 days, 0 refills  
- busPIRone HCl 5 MG Oral Tablet one tablet twice daily, 90 days, 0 refills  
- lamoTRIgine 100 MG Oral Tablet once daily, 30 days, 0 refills  
- metFORMIN HCl 500 MG Oral Tablet AM and PM per GYN/[facility] in [place], 30 days, 0
   
refills  
- MiraLax 17 GM/SCOOP Oral Powder mix 1 scoop with 8 ounces once daily, 30 days,
 2   
refills  
- Narcan 4 MG/0.1ML Nasal Liquid spray in nostril for symptoms of overdose , may
   
repeat in 2 minutes if symptoms persist, 1 days, 0 refills  
- Prazosin HCl 1 MG Oral Capsule twice daily, 90 days, 0 refills  
- Sertraline HCl 50 MG Oral Tablet Once daily, 30 days, 0 refills  
- traZODone HCl 100 MG Oral Tablet twice daily, 30 days, 0 refills  
  
** Past Medical/Surgical History **  
Reported:  
Has sex without a condom.  
Medical: No previous hospitalizations. Chronic illness and Sexually transmitted   
infection Partners sexually transmitted infection status known.  
Immunization History: Recent immunization for flu.  
Exposure: Exposure to COVID-19.  
Pregnancy: Previously pregnant 1 time(s) and para having 0 live birth(s). Not   
planning a pregnancy in the next year.  
Diagnoses:  
Polycystic Ovarian Syndrome (PCOS).  
Migraine headache.  
Psychiatric disorders biopolar disorder  
Anxiety disorder NOS  
Procedural:  
- Insertion of ear pressure equalization tubes in both ears  
Surgical:  
- Tonsillectomy  
- Tonsillectomy with adenoidectomy  
  
** Social History **  
Environmental Exposure: No secondhand cigarette smoke exposure.  
Behavioral: Not a current tobacco user.  
Tobacco use: Using electronic cigarettes/vaping.  
Alcohol: Not using alcohol.  
Drug Use: Not using drugs denied by patient.  
Sexual: Sexually active age of sexual partner is _____ years old 21, sexual   
orientation Lesbian or David, gender identity Other, and birth control is being   
practiced Not Using - In Same Sex Relationship.  
  
** Allergies **  
- Abilify Reaction: groggy  
- Augmentin Reaction: Shock  
- metformin Reaction: Nausea, Vomiting  
- trazodone Reaction: Panic attack  
- Zoloft Reaction: slow/ groggy  
  
** Family History **  
Paternal:  
Systemic hypertension  
Oncologic disorder  
Maternal:  
Systemic hypertension  
Epilepsy and recurrent seizures  
Psychiatric disorders  
Oncologic disorder  
Fraternal:  
Psychiatric disorders  
  
** Review Of Systems **  
Head: No head symptoms.  
Neck: No neck symptoms.  
Eyes: No eye symptoms.  
Otolaryngeal: No ear symptoms, no nasal symptoms, no nose and sinus finding, no   
throat symptoms, no oral cavity symptoms, and no jaw symptoms.  
Breasts: No breast symptoms.  
Cardiovascular: No cardiovascular symptoms and no chest pain or discomfort.  
Pulmonary: No pulmonary symptoms.  
Gastrointestinal: No gastrointestinal symptoms.  
Genitourinary: No genitourinary symptoms.  
Musculoskeletal: No musculoskeletal symptoms.  
Neurological: No neurological symptoms.  
Psychological: Easily distracted.  
Skin: No skin symptoms.  
  
** Physical Findings **  
- Vitals taken 2024 07:09 pm  
BP-Sitting R134/88 mmHg  
BP Cuff SizeLarge  
Pulse Rate-Zjwazoc971 bpm  
Vmdenq30 in  
Dcotmm350 lbs  
Body Mass Index52.7 kg/m2  
Body Surface Area2.3 m2  
Oxygen Dygitrbsjh99 %  
  
Vital Signs:  
- Systolic blood pressure 130 - 139 mmHg.  
- Diastolic blood pressure 80-89 mmHg.  
General Appearance:  
- Awake. - Alert. - Well developed. - Well nourished. - In no acute distress.  
Lungs:  
- Respiration rhythm and depth was normal. - Clear to auscultation.  
Cardiovascular:  
Heart Rate And Rhythm: - Normal.  
Heart Sounds: - Normal.  
Murmurs: - No murmurs were heard.  
Abdomen:  
Visual Inspection: - Abdomen was normal on visual inspection.  
Musculoskeletal System:  
General/bilateral: - Normal movement of all extremities.  
Skin:  
- General appearance was normal.  
  
** Tests **  
Blood Analysis:  
Hemoglobin Studies: ValueDate  
Blood hemoglobin A1c 5.5%3/27/2024  
Hemoglobin A1c level < 7.0%.  
Blood Endocrine Laboratory Tests: Value  
Blood glucose level by fingerstick Non-fasting 96 mg/dl  
Laboratory-based Chemistry:  
Other Laboratory Tests:  
Screening for sexually transmitted infections was performed.  
  
** Assessment **  
- Z11.3 - Encounter for screening for infections with a predominantly sexual 
mode of   
transmission  
- Z68.43 - Body mass index [BMI] 50.0-59.9, adult  
- Z13.1 - Encounter for screening for diabetes mellitus  
- F90.9 - Attention-deficit hyperactivity disorder, unspecified type  
  
** Vaccinations **  
- 1st Dose PFIZER COVID-19 Vaccine Dose #1 Status: Prev Hist Date: 2021  
- 1st Dose PFIZER COVID-19 Vaccine Dose #2 Status: Prev Hist Date: 2021  
- DTP Dose #1 Status: Prev Hist Date: 1999  
- Hep B, adolescent or pediatric (Engerix-B) Dose #1 Status: Prev Hist Date:   
1999  
- Hep B, adolescent or pediatric (Engerix-B) Dose #2 Status: Prev Hist Date:   
1999  
- Hib-Hep B (Comvax) Dose #1 Status: Prev Hist Date: 2000  
- IPV (IPOL) Dose #1 Status: Prev Hist Date: 1999  
- IPV (IPOL) Dose #2 Status: Prev Hist Date: 2000  
- IPV (IPOL) Dose #3 Status: Prev Hist Date: 2004  
- MMR (MMR-II) Dose #1 Status: Prev Hist Date: 2000  
- MMR (MMR-II) Dose #2 Status: Prev Hist Date: 2004  
- Meningococcal MCV4O Dose #1 Status: Prev Hist Date: 2016  
- OPV Dose #1 Status: Prev Hist Date: 1999  
- Tdap (Boostrix) Dose #1 Status: Prev Hist Date: 2010  
  
- Received dose of Reported: Patient has received the COVID Vaccine  
  
** Counseling/Education **  
- Wishing to stop using electronic cigarettes/vaping  
- Discussed nutritional needs teach healthy choices including fruits and 
vegetables  
- Patient education about a proper diet  
- Not requesting contraception  
- Discussed concerns about exercise: promote physical activity  
  
** Plan **  
StartCited- Attention-deficit hyperactivity disorder, unspecified type  
Intuniv 1 MG tablet take 1 tablet by mouth once daily at bedtime, 30 days, 5 
refills  
EndCited  
StartCited- Encntr screen for infections w sexl mode of transmiss  
Outside Labs/Microbiology: All 3 Urine Test Gonorrhea-Chlamydia-Trichomonas  
EndCited  
StartCited- Other  
Follow-up Appointment  
2 month med check  
ARIPiprazole 5 MG tablet take 1 tablet by mouth once daily, 30 days, 5 refills  
busPIRone HCl 5 MG tablet take 1 tablet by mouth twice daily, 30 days, 5 refills  
lamoTRIgine 100 MG tablet take 1 tablet by mouth once daily, 30 days, 5 refills  
Prazosin HCl 1 MG capsule take 1 capsule by mouth twice daily, 30 days, 5 
refills  
Sertraline HCl 50 MG tablet take 1 tablet by mouth once daily, 30 days, 5 
refills  
traZODone HCl 100 MG tablet take 1 tablet by mouth twice daily, 30 days, 5 
refills  
EndCited  
** Care Team **  
- Doreen Cabrera CNP  
  
** Health Reminders **  
- Assess BMI satisfied 2024.  
- Assess Tobacco Use satisfied 2024.  
- Diabetes Risk Screening Needed satisfied 2024.  
- Follow Up Plan BMI Management satisfied 2024.  
- Smoking & Tobacco Cessation Intervention and Counseling satisfied 2024.  
  
** User Defined 1 **  
Not planning a pregnancy in the next year.  
No (0 points) [Pre-DM]: No, patient has not been diagnosed with high blood 
pressure,   
No (0 points) [Pre-DM]: Patient has not been diagnosed with gestational diabetes
or   
given birth to a baby weighing 9 pounds or more, No (0 points) [Pre-DM]: No, 
mother,   
father, sister, or brother does not have DM, No (1 point) [Pre-DM]: No, not   
physically active, and Woman (0 Points) [Pre-DM].  
Less than 40 years (0 points) [Pre-DM].  
  
  
[facility]03- Progress note*   
  
Progress note  
  
  
  
                                Date            Encounter       Last Documented   
by  
   
                                2024       Established Patient Last docu  
mented on 2024; 5:16 PM,   
Leonie HUTCHINSON; [facility]  
  
  
  
                                                      
  
  
** Active Problems & Conditions **  
- F90.9 - Attention-deficit Hyperactivity Disorder  
- F31.31 - Bipolar I Disorder, Most Recent Episode, Depressed Mild  
- F43.10 - Post-traumatic Stress Disorder  
  
** Chief Complaint **  
The Chief Complaint is: [facility] met with patient to follow-up regarding mood and   
medications and discuss PHQ score of 2. Patient reports recently getting 
dismissed   
from services at a [facility] in [place] due to missing 
appointments.   
Patient reports being on a good medication regimen and feels that moods have 
been   
stable, sober from drugs for 6-7 months, in a healthy relationship and engaging 
with   
family and friends. Patient reports being diagnosed with ADHD but not treated at
 this   
time and would like to be due to concentration and focus issues. Patient reports
 no   
thoughts of harm toward self or others.  
  
** History of Present Illness **  
Linnette Beckham is a 23 year old female.  
- No Irritability - Normal appetite  
- Anxiety - Energy level is good - Easily distracted - Racing thoughts - No   
depression - No sleep disturbances - No loss of interest in activities - Not 
feeling   
guilty - No social isolation - No impulsive behavior - No high involvement in   
pleasurable activities  
  
** Current Medication **  
- ARIPiprazole 5 MG Oral Tablet take 1 tablet by mouth once daily, 30 days, 5 
refills  
- ARIPiprazole 5 MG Oral Tablet once daily, 90 days, 0 refills  
- busPIRone HCl 5 MG Oral Tablet take 1 tablet by mouth twice daily, 30 days, 5   
refills  
- busPIRone HCl 5 MG Oral Tablet one tablet twice daily, 90 days, 0 refills  
- Intuniv 1 MG Oral Tablet Extended Release 24 Hour take 1 tablet by mouth once 
daily   
at bedtime, 30 days, 5 refills  
- lamoTRIgine 100 MG Oral Tablet take 1 tablet by mouth once daily, 30 days, 5   
refills  
- lamoTRIgine 100 MG Oral Tablet once daily, 30 days, 0 refills  
- metFORMIN HCl 500 MG Oral Tablet AM and PM per GYN/[facility] in [place], 30 days, 0
   
refills  
- MiraLax 17 GM/SCOOP Oral Powder mix 1 scoop with 8 ounces once daily, 30 days,
 2   
refills  
- Narcan 4 MG/0.1ML Nasal Liquid spray in nostril for symptoms of overdose , may
   
repeat in 2 minutes if symptoms persist, 1 days, 0 refills  
- Prazosin HCl 1 MG Oral Capsule take 1 capsule by mouth twice daily, 30 days, 5
   
refills  
- Prazosin HCl 1 MG Oral Capsule twice daily, 90 days, 0 refills  
- Sertraline HCl 50 MG Oral Tablet take 1 tablet by mouth once daily, 30 days, 5
   
refills  
- Sertraline HCl 50 MG Oral Tablet Once daily, 30 days, 0 refills  
- traZODone HCl 100 MG Oral Tablet take 1 tablet by mouth twice daily, 30 days, 
5   
refills  
- traZODone HCl 100 MG Oral Tablet twice daily, 30 days, 0 refills  
  
** Social History **  
Environmental Exposure: No secondhand cigarette smoke exposure.  
Personal: Recent emotional stress due to running out of medications recently.  
Behavioral: Not a current tobacco user.  
Tobacco use: Using electronic cigarettes/vaping.  
Alcohol: Not using alcohol.  
Drug Use: Recovering from drug addiction. Not using drugs.  
  
** Physical Findings **  
General Appearance:  
- Normal Appearance.  
Neurological:  
- Estimated intelligence was normal. - Oriented to time, place, and person. - No
   
hallucinations. - Judgement was not impaired.  
Speech: - Is Normal.  
Psychiatric:  
- Mood is Euthymic. - Attitude Open.  
Demonstrated Behavior: - Motor Activity Normal Activity. - Eye Contact 
Appropriate.  
Affect: - Congruent with the mood.  
Thought Content: - Insight was intact. - No delusions. - No suicidal ideation. -
 No   
Passive thoughts of Death. - No suicidal plans. - No suicidal intent. - No 
homicidal   
ideations. - No homicidal plans. - No homicidal intent.  
Past Medical:  
- No repetitive self injurious behavior.  
  
** Assessment **  
- Z13.89 - Encounter for screening for other disorder  
- F90.9 - Attention-deficit hyperactivity disorder, unspecified type  
- F31.31 - Bipolar disorder, current episode depressed, mild  
- F43.10 - Post-traumatic stress disorder, unspecified  
  
** Therapy **  
- Developmental/Behavioral Screening & Testing - PHQ9.  
- SBIRT Full Screen Neg.  
- Brief solution-focused therapy.  
-  Visit 30 Minutes.  
- Plan - PCP to continue current medications and start Intuniv 1 mg daily. 
Patient   
agreeable to plan and Collaborated with patient and provider:  
  
** Counseling/Education **  
[facility] offered active and supportive listening and processed current stressors 
related   
to getting medications.  
[facility] discussed coping skills and supports to implement in daily routine.  
[facility] discussed progress patient has felt they have made recently and encouraged   
continued follow-up with providers to address health.  
  
** Plan **  
Patient to take medications as prescribed and contact the office with any 
questions   
or concerns.  
Patient to implement coping skills and positive supports as discussed.  
[facility] to attempt to follow-up with patient via phone in 2 weeks and at next in 
person   
visit as scheduled.  
  
** Care Team **  
- Doreen Cabrera CNP  
  
** Health Reminders **  
- Assess Tobacco Use satisfied 2024.  
- ESTHELA-2 satisfied 2024.  
- PHQ9 / PHQA satisfied 2024.  
- SBIRT satisfied 2024.  
  
** User Defined 1 **  
Not afraid of someone you have a relationship with No.  
Has lack of transportation kept patient from medical appointments or from 
getting   
medications: No and lack of transportation has kept patient from beneficial   
non-medical activities: No.  
She has not had 4 or more drinks in a day within the past year. Do you feel 
stress -   
tense, restless, nervous, or anxious, or unable to sleep at night because your 
mind   
is troubled all the time - these days? A little bit. No misuse of prescription 
only   
drugs. Illicit drug use and Does patient feel physically and emotionally safe 
where   
he/she lives: Yes. Is patient is worried about losing housing: No. What is the   
highest grade or level of school you have completed or the highest degree you 
have   
received? More than high school. In the past year, patient or family members in   
household were unable to get needed clothing: No, unable to get needed child 
care:   
No, unable to get other needs No, unable to get needed phone: No, unable to get   
needed utilities: No, unable to get needed Medicine or Health Care: No, and In 
the   
past year, patient or family members in household were unable to get needed 
food: No.   
How often does patient see or talk to people that that he/she cares about and 
feels   
close to: 5 or more times a week.  
ESTHELA-2 score was two 3/27/2024, ESTHELA-7 score [ESTHELA-7] Feeling nervous, anxious or 
on   
edge? + 2 pt : More than half the days, [ESTHELA-7] Not being able to stop or 
control   
worrying? + 0 pt : Not at all, Patient Health Questionnaire 9-Item total score 
was   
two 3/27/2024 If you checked off problems, how difficult is it for you to do 
your   
work? + : Somewhat difficult, [PHQ-9-1] Little interest or pleasure in doing 
things?   
+ 0 pt : Not at all, [PHQ-9-2] Feeling down, depressed, or hopeless? + 0 pt : 
Not at   
all, [PHQ-9-3] Trouble falling or staying asleep or sleeping too much? + 0 pt : 
Not   
at all, [PHQ-9-4] Feeling tired or having little energy? + 0 pt : Not at all,   
[PHQ-9-5] Poor appetite or overeating? + 0 pt : Not at all, [PHQ-9-6] Feeling 
bad   
about yourself-or that you are a failure + 0 pt : Not at all, [PHQ-9-7] Trouble   
concentrating on things such as reading the newspaper + 2 pt : More than half 
the   
days, [PHQ-9-8] Moving or speaking so slowly that other people have noticed. + 0
 pt :   
Not at all, and [PHQ-9-9] Thoughts that you would be better off dead or hurting   
yourself? + 0 pt : Not at all.  
  
  
[facility]07- Progress note*   
  
Progress note  
  
  
  
                                Date            Encounter       Last Documented   
by  
   
                                2023      Medical Established Patient Last  
 documented on 2023; 6:01 PM,   
Doreen Cabrera CNP; [facility]  
  
  
  
                                                      
  
  
** Active Problems & Conditions **  
- F31.10 - Bipolar I Disorder, Most Recent Episode, Manic  
- F60.3 - Borderline Personality Disorder  
- F43.10 - Post-traumatic Stress Disorder  
  
** Chief Complaint **  
The Chief Complaint is: Yearly follow up/would like referral to psychiatry (not 
Dr. Sullivan).  
  
** Referred Here **  
No prior encounters.  
  
** History of Present Illness **  
Linnette Beckham is a 23 year old female.  
- Allergy list reviewed - Reviewed Medications not taking any medications -   
Medication list reviewed  
Presents to get psych referral  anyone but dr sullivan  we had tried dr alonso in 
the   
past who was not accepting pts at that time. bh not in house and pt felt she 
didnt   
need to speak to the outside provider covering   I have a counselor   
  
Yearly follow up, would like a referral to psychiatry/not Dr. Sullivan, she does see
   
Haylie Almendarez a counselor at [facility]  
Currently only taking Metformin d/t PCOS  
  
** Current Medication **  
- Aldactazide 50-50 MG Oral Tablet take 1 tablet by mouth once daily, 30 days, 
11   
refills  
- metFORMIN HCl 500 MG Oral Tablet AM and PM per GYN/[facility] in [place], 30 days, 0
   
refills  
- MiraLax 17 GM/SCOOP Oral Powder mix 1 scoop with 8 ounces once daily, 30 days,
 2   
refills  
- Narcan 4 MG/0.1ML Nasal Liquid spray in nostril for symptoms of overdose , may
   
repeat in 2 minutes if symptoms persist, 1 days, 0 refills  
  
** Past Medical/Surgical History **  
Reported:  
Has sex without a condom.  
Medical: No previous hospitalizations. Chronic illness and Sexually transmitted   
infection Partners sexually transmitted infection status known.  
Exposure: Exposure to COVID-19.  
Pregnancy: Previously pregnant 0 time(s). Not planning to have a baby in the 
next 12   
months.  
Diagnoses:  
Polycystic Ovarian Syndrome (PCOS).  
Migraine headache.  
Psychiatric disorders biopolar disorder  
Anxiety disorder NOS  
Procedural:  
- Insertion of ear pressure equalization tubes in both ears  
Surgical:  
- Tonsillectomy  
- Tonsillectomy with adenoidectomy  
  
** Social History **  
Environmental Exposure: No secondhand cigarette smoke exposure.  
Behavioral: Not a current tobacco user.  
Tobacco use: Not using electronic cigarettes/vaping.  
Alcohol: Not using alcohol.  
Drug Use: Using marijuana.  
Sexual: Sexual orientation Other and gender identity Other.  
  
** Allergies **  
- Abilify Reaction: groggy  
- Augmentin Reaction: Shock  
- metformin Reaction: Nausea, Vomiting  
- trazodone Reaction: Panic attack  
- Zoloft Reaction: slow/ groggy  
  
** Family History **  
Paternal:  
Systemic hypertension  
Oncologic disorder  
Maternal:  
Systemic hypertension  
Epilepsy and recurrent seizures  
Psychiatric disorders  
Oncologic disorder  
Fraternal:  
Psychiatric disorders  
  
** Review Of Systems **  
Head: No head symptoms.  
Neck: No neck symptoms.  
Eyes: No eye symptoms.  
Otolaryngeal: No ear symptoms, no nasal symptoms, no nose and sinus finding, no   
throat symptoms, no oral cavity symptoms, and no jaw symptoms.  
Breasts: No breast symptoms.  
Cardiovascular: No cardiovascular symptoms and no chest pain or discomfort.  
Pulmonary: No pulmonary symptoms.  
Gastrointestinal: No gastrointestinal symptoms.  
Genitourinary: No genitourinary symptoms.  
Musculoskeletal: No musculoskeletal symptoms.  
Neurological: No neurological symptoms.  
Psychological: No psychological symptoms.  
Skin: No skin symptoms.  
Cardiovascular: Edema not present.  
  
** Physical Findings **  
- Vitals taken 2023 05:08 pm  
BP-Sitting L111/76 mmHg  
BP Cuff SizeLarge  
Pulse Rate-Ygacowb68 bpm  
Temp-Oral98.2 F  
Pcquij69 in  
Kmkhaz531 lbs  
Body Mass Index32.8 kg/m2  
Body Surface Area1.9 m2  
Oxygen Rfdoazwcxa27 %  
  
Vital Signs:  
- Systolic blood pressure < 130 mmHg.  
- Diastolic Blood Pressure < 80 mmHg.  
General Appearance:  
- Awake. - Alert. - Well developed. - Well nourished. - In no acute distress.  
Lungs:  
- Respiration rhythm and depth was normal. - Clear to auscultation.  
Cardiovascular:  
Heart Rate And Rhythm: - Normal.  
Heart Sounds: - Normal.  
Murmurs: - No murmurs were heard.  
Thrill: - No thrill.  
Abdomen:  
Visual Inspection: - Abdomen was normal on visual inspection.  
Musculoskeletal System:  
General/bilateral: - Abnormal movement of all extremities.  
Skin:  
- General appearance was normal.  
  
** Tests **  
Blood Analysis:  
Hemoglobin Studies: ValueDate  
Blood hemoglobin A1c 5.3%2023  
Hemoglobin A1c level < 7.0%.  
Blood Endocrine Laboratory Tests: Value  
Blood glucose level by fingerstick Non-fasting 114 mg/dl  
  
** Assessment **  
- Z68.32 - Body mass index [BMI] 32.0-32.9, adult  
- F60.3 - Borderline personality disorder  
- Z13.1 - Encounter for screening for diabetes mellitus  
  
** Therapy **  
- Patient has agreed to receive flu vaccine today.  
  
** Vaccinations **  
- Received dose of Reported: Patient has received the COVID Vaccine  
  
** Counseling/Education **  
- Discussed nutritional needs teach healthy choices including fruits and 
vegetables  
- Patient education about a proper diet  
- Discussed concerns about exercise: promote physical activity  
  
** Plan **  
StartCited- Borderline personality disorder  
Referrals: Psychiatry  
Instructions: Please make a referral to: see if dr alonso accepting now, 
otherwise ok   
with arm[facility] or [facility]  
EndCited  
StartCited- Essential (primary) hypertension  
Aldactazide 50-50 MG tablet take 1 tablet by mouth once daily, 30 days, 11 
refills  
EndCited  
StartCited- Other  
Follow-up Appointment  
f/u phone call 1 month  
EndCited  
** Health Reminders **  
- Assess BMI satisfied 2023.  
- Assess Tobacco Use satisfied 2023.  
- Follow Up Plan BMI Management satisfied 2023.  
  
  
[facility]  
  
Includes: Assessments for all patient encounters  
  
  
  
                                Findings        Encounter       Date  
   
                                        Assessment of body mass index Medical Es  
tablished Patient with   
Doreen Cabrera CNP                        10/21/2022  
   
                                                    Bipolar affective disorder,   
current   
episode manic                            Telebehavioral Health with Haylie Aguilar [facility]                        2022  
   
                                                    Bipolar affective disorder,   
current   
episode manic                            Established Patient with Haylie Aguilar [facility]                        2022  
   
                                                    Bipolar affective disorder,   
current   
episode depressed, severe with   
psychosis                                Telebehavioral Health with Haylie Aguilar [facility]                        2022  
   
                                                    Assessment of body mass inde  
x [Body   
mass index [BMI] 50.0-59.9, adult]      Open Access - Established with   
Julieth Pisano CNP                        2022  
   
                                                    Bipolar I disorder, most rec  
ent   
episode, manic                          Open Access - Established with   
Julieth Pisano CNP                        2022  
   
                                        Post-traumatic stress disorder  Establ  
ished Patient with [facility]lie [facility]                        2022  
   
                                        No cough            Medical Established   
Patient with   
Doreen Mccormacken CNP                        2022  
   
                                                    Z68.43 - Body mass index [BM  
I]   
50.0-59.9, adult                        Medical Established Patient with   
Doreen Deborah CNP                        2022  
   
                                                    Borderline personality disor  
danny Pt   
reported hx of sx/dx                     Established Patient with [facility]lienicanor [facility]                        2022  
   
                                                    Assessment of body mass inde  
x [Body   
mass index [BMI] 50.0-59.9, adult]      Open Access - Established with   
Julieth Aliya CNP                        2022  
   
                                                    Diabetes Risk Test Score was  
 three   
score 2022                         Open Access - Established with   
Julieth Aliya CNP                        2022  
   
                                                    Bipolar I disorder, most rec  
ent   
episode, manic                           Established Patient with   
Avis Felder [facility]                2021  
   
                                        Borderline personality disorder  Estab  
lished Patient with   
Avis Felder [facility]                2021  
   
                                        Post-traumatic stress disorder  Establ  
ished Patient with   
Avis Felder [facility]                2021  
   
                                                    Assessment of visit for: bhargavi myles for   
human immunodeficiency virus            Medical Established Patient with   
Doreen Deborah CNP                        2021  
   
                                        Nicotine dependence Medical Established   
Patient with   
Doreen Deborah CNP                        2021  
   
                                        Tachycardia         Medical Established   
Patient with   
Doreen Deborah CNP                        2021  
   
                                                    Z68.43 - Body mass index [BM  
I]   
50.0-59.9, adult                        Medical Established Patient with   
Doreen Deborah CNP                        2021  
   
                                                    Bipolar I disorder, most rec  
ent   
episode, manic                           Established Patient with Leonie   
[facility]                             2021  
   
                                                    Borderline personality disor  
danny per   
patient reported history                BH Established Patient with Leonie   
[facility]                             2021  
   
                                        Nicotine dependence  Established Patie  
nt with Leonie   
[facility]                             2021  
   
                                        Post-traumatic stress disorder  Establ  
ished Patient with Leonie   
[facility]                             2021  
   
                                        Body mass index     Medical Established   
Patient with   
Doreen Deborah CNP                        2021  
   
                                        Morbid obesity      Medical Established   
Patient with   
Doreen Deborah CNP                        2021  
   
                                        Nicotine dependence uncomplicated Medica  
l Established Patient with   
Doreen Deborah CNP                        2021  
   
                                                    Z68.42 - Body mass index [BM  
I]   
45.0-49.9, adult                        Medical Established Patient with   
Doreen Deborah CNP                        2021  
   
                                Episodic mood disorders On Call with Pamela edwards CNP 2021  
   
                                                    Mood disorders, NOS per tabatha  
ent   
reported history                        BH Established Patient with Leonie   
[facility]                             2021  
   
                                        Post-traumatic stress disorder BH Establ  
ished Patient with Leonie   
[facility]                             2021  
   
                                        Morbid obesity      Medical Established   
Patient with   
Doreen Deborah CNP                        2021  
   
                                        Otitis externa      Medical Established   
Patient with   
Doreen Deborah CNP                        2021  
   
                                                    Z68.42 - Body mass index [BM  
I]   
45.0-49.9, adult                        Medical Established Patient with   
Doreen Deborah CNP                        2021  
   
                                        Post-traumatic stress disorder BH Establ  
ished Patient with Leonie   
[facility]                             06/10/2021  
   
                                        Morbid obesity      Medical Established   
Patient with   
Doreen Deborah [facility]                        06/10/2021  
   
                                                    Z68.42 - Body mass index [BM  
I]   
45.0-49.9, adult                        Medical Established Patient with   
Doreen Deborah [facility]                        06/10/2021  
   
                                        Post-traumatic stress disorder  Establ  
ished Patient with Leonie   
[facility]                             2021  
   
                                                    Assessment of visit for: bhargavi myles for   
human immunodeficiency virus            Medical New Patient with Doreen   
Deborah [facility]                              2021  
   
                                                    Diabetes Risk Test Score was  
 one score   
2021                               Medical New Patient with Doreen   
Deborah [facility]                              2021  
   
                                        Hypertension        Medical New Patient   
with Doreen   
Deborah [facility]                              2021  
   
                                        Morbid obesity      Medical New Patient   
with Doreen   
Deborah [facility]                              2021  
   
                                        Post-traumatic stress disorder Medical N  
ew Patient with Doreen   
Deborah [facility]                              2021  
   
                                                    Z68.42 - Body mass index [BM  
I]   
45.0-49.9, adult                        Medical New Patient with Doreen   
Deborah [facility]                              2021  
  
[facility]  
Work Phone: [phone]- Evaluation note  
  
Includes: Assessments for all patient encounters  
  
  
  
                                Findings        Encounter       Date  
   
                                                    Bipolar affective disorder,   
current   
episode manic                            Telebehavioral Health with Haylie Aguilar [facility]                        2022  
   
                                                    Bipolar affective disorder,   
current   
episode manic                            Established Patient with Haylienicanor Aguilar [facility]                        2022  
   
                                                    Bipolar affective disorder,   
current   
episode depressed, severe with   
psychosis                                Telebehavioral Health with Haylienicanor Aguilar [facility]                        2022  
   
                                                    Assessment of body mass inde  
x [Body   
mass index [BMI] 50.0-59.9, adult]      Open Access - Established with   
Julieth Aliya CNP                        2022  
   
                                                    Bipolar I disorder, most rec  
ent   
episode, manic                          Open Access - Established with   
Julieth Aliya CNP                        2022  
   
                                        Post-traumatic stress disorder  Establ  
ished Patient with Haylienicanor Aguilar [facility]                        2022  
   
                                        No cough            Medical Established   
Patient with   
Doreen Deborah CNP                        2022  
   
                                                    Z68.43 - Body mass index [BM  
I]   
50.0-59.9, adult                        Medical Established Patient with   
Doreen Deborah CNP                        2022  
   
                                                    Borderline personality disor  
danny Pt   
reported hx of sx/dx                     Established Patient with Haylie Aguilar [facility]                        2022  
   
                                                    Assessment of body mass inde  
x [Body   
mass index [BMI] 50.0-59.9, adult]      Open Access - Established with   
Julieth Aliya CNP                        2022  
   
                                                    Diabetes Risk Test Score was  
 three   
score 2022                         Open Access - Established with   
Julieth Aliya CNP                        2022  
   
                                                    Bipolar I disorder, most rec  
ent   
episode, manic                           Established Patient with   
Avis Felder [facility]                2021  
   
                                        Borderline personality disorder  Estab  
lished Patient with   
Avis Felder [facility]                2021  
   
                                        Post-traumatic stress disorder  Establ  
ished Patient with   
Avis Felder [facility]                2021  
   
                                                    Assessment of visit for: bhargavi myles for   
human immunodeficiency virus            Medical Established Patient with   
Doreen Deborah CNP                        2021  
   
                                        Nicotine dependence Medical Established   
Patient with   
Doreen Deborah CNP                        2021  
   
                                        Tachycardia         Medical Established   
Patient with   
Doreen Deborah CNP                        2021  
   
                                                    Z68.43 - Body mass index [BM  
I]   
50.0-59.9, adult                        Medical Established Patient with   
Doreen Deborah CNP                        2021  
   
                                                    Bipolar I disorder, most rec  
ent   
episode, manic                           Established Patient with Leonie   
[facility]                             2021  
   
                                                    Borderline personality disor  
danny per   
patient reported history                BH Established Patient with Leonie   
[facility]                             2021  
   
                                        Nicotine dependence BH Established Patie  
nt with Leonie   
[facility]                             2021  
   
                                        Post-traumatic stress disorder BH Establ  
ished Patient with Leonie   
[facility]                             2021  
   
                                        Body mass index     Medical Established   
Patient with   
Doreen Deborah CNP                        2021  
   
                                        Morbid obesity      Medical Established   
Patient with   
Doreen Deborah CNP                        2021  
   
                                        Nicotine dependence uncomplicated Medica  
l Established Patient with   
Doreen Deborah CNP                        2021  
   
                                                    Z68.42 - Body mass index [BM  
I]   
45.0-49.9, adult                        Medical Established Patient with   
Doreen Deborah CNP                        2021  
   
                                Episodic mood disorders On Call with Pamela edwards CNP 2021  
   
                                                    Mood disorders, NOS per tabatha  
ent   
reported history                        BH Established Patient with Leonie   
[facility]                             2021  
   
                                        Post-traumatic stress disorder BH Establ  
ished Patient with Leonie   
[facility]                             2021  
   
                                        Morbid obesity      Medical Established   
Patient with   
Doreen Deborah CNP                        2021  
   
                                        Otitis externa      Medical Established   
Patient with   
Doreen Deborah CNP                        2021  
   
                                                    Z68.42 - Body mass index [BM  
I]   
45.0-49.9, adult                        Medical Established Patient with   
Doreen Deborah CNP                        2021  
   
                                        Post-traumatic stress disorder BH Establ  
ished Patient with Leonie   
[facility]                             06/10/2021  
   
                                        Morbid obesity      Medical Established   
Patient with   
Doreen Deborah CNP                        06/10/2021  
   
                                                    Z68.42 - Body mass index [BM  
I]   
45.0-49.9, adult                        Medical Established Patient with   
Doreen Deborah CNP                        06/10/2021  
   
                                        Post-traumatic stress disorder BH Establ  
ished Patient with Leonie   
[facility]                             2021  
   
                                                    Assessment of visit for: bhargavi myles for   
human immunodeficiency virus            Medical New Patient with Doreen   
Deborah CNP                              2021  
   
                                                    Diabetes Risk Test Score was  
 one score   
2021                               Medical New Patient with Doreen   
Deborah CNP                              2021  
   
                                        Hypertension        Medical New Patient   
with Doreen   
Deborah CNP                              2021  
   
                                        Morbid obesity      Medical New Patient   
with Doreen   
Deborah [facility]                              2021  
   
                                        Post-traumatic stress disorder Medical N  
ew Patient with Doreen   
Deborah CNP                              2021  
   
                                                    Z68.42 - Body mass index [BM  
I]   
45.0-49.9, adult                        Medical New Patient with Doreenpaola Cabrera [facility]                              2021  
  
[facility]  
Work Phone: [phone]- Evaluation note  
  
Includes: Assessments for all patient encounters  
  
  
  
                                Findings        Encounter       Date  
   
                                                    Bipolar affective disorder,   
current   
episode depressed, severe with   
psychosis                                Telebehavioral Health with Haylienicanor Martinerson [facility]                        2022  
   
                                                    Assessment of body mass inde  
x [Body   
mass index [BMI] 50.0-59.9, adult]      Open Access - Established with   
Julieth Aliya CNP                        2022  
   
                                        Post-traumatic stress disorder  Establ  
ished Patient with Haylie   
Aguilar [facility]                        2022  
   
                                        No cough            Medical Established   
Patient with   
Doreen Deborah [facility]                        2022  
   
                                                    Z68.43 - Body mass index [BM  
I]   
50.0-59.9, adult                        Medical Established Patient with   
Doreen Deborah [facility]                        2022  
   
                                                    Borderline personality disor  
danny Pt   
reported hx of sx/dx                     Established Patient with Haylie   
Aguilar [facility]                        2022  
   
                                                    Assessment of body mass inde  
x [Body   
mass index [BMI] 50.0-59.9, adult]      Open Access - Established with   
Julieth Aliya CNP                        2022  
   
                                                    Diabetes Risk Test Score was  
 three   
score 2022                         Open Access - Established with   
Julieth Aliya CNP                        2022  
   
                                                    Bipolar I disorder, most rec  
ent   
episode, manic                           Established Patient with   
Avis Felder [facility]                2021  
   
                                        Borderline personality disorder  Estab  
lished Patient with   
Avis Felder [facility]                2021  
   
                                        Post-traumatic stress disorder  Establ  
ished Patient with   
Avis Felder [facility]                2021  
   
                                                    Assessment of visit for: bhargavi myles for   
human immunodeficiency virus            Medical Established Patient with   
Doreen Deborah [facility]                        2021  
   
                                        Nicotine dependence Medical Established   
Patient with   
Doreen Deborah [facility]                        2021  
   
                                        Tachycardia         Medical Established   
Patient with   
Doreen Deborah [facility]                        2021  
   
                                                    Z68.43 - Body mass index [BM  
I]   
50.0-59.9, adult                        Medical Established Patient with   
Doreen Deborah CNP                        2021  
   
                                                    Bipolar I disorder, most rec  
ent   
episode, manic                           Established Patient with Leonie   
[facility]                             2021  
   
                                                    Borderline personality disor  
danny per   
patient reported history                BH Established Patient with Leonie   
[facility]                             2021  
   
                                        Nicotine dependence BH Established Patie  
nt with Leonie   
[facility]                             2021  
   
                                        Post-traumatic stress disorder  Establ  
ished Patient with Leonie   
[facility]                             2021  
   
                                        Body mass index     Medical Established   
Patient with   
Doreen Deborah CNP                        2021  
   
                                        Morbid obesity      Medical Established   
Patient with   
Doreen Deborah CNP                        2021  
   
                                        Nicotine dependence uncomplicated Medica  
l Established Patient with   
Doreen Deborah CNP                        2021  
   
                                                    Z68.42 - Body mass index [BM  
I]   
45.0-49.9, adult                        Medical Established Patient with   
Doreen Deborah CNP                        2021  
   
                                Episodic mood disorders On Call with Pamela edwards CNP 2021  
   
                                                    Mood disorders, NOS per tabatha  
ent   
reported history                         Established Patient with Leonie   
[facility]                             2021  
   
                                        Post-traumatic stress disorder  Establ  
ished Patient with Leonie   
[facility]                             2021  
   
                                        Morbid obesity      Medical Established   
Patient with   
Doreen Deborah CNP                        2021  
   
                                        Otitis externa      Medical Established   
Patient with   
Doreen Deborah CNP                        2021  
   
                                                    Z68.42 - Body mass index [BM  
I]   
45.0-49.9, adult                        Medical Established Patient with   
Doreen Deborah CNP                        2021  
   
                                        Post-traumatic stress disorder  Establ  
ished Patient with Leonie   
[facility]                             06/10/2021  
   
                                        Morbid obesity      Medical Established   
Patient with   
Doreen Deborah CNP                        06/10/2021  
   
                                                    Z68.42 - Body mass index [BM  
I]   
45.0-49.9, adult                        Medical Established Patient with   
Doreen Deborah CNP                        06/10/2021  
   
                                        Post-traumatic stress disorder BH Establ  
ished Patient with Leonie   
[facility]                             2021  
   
                                                    Assessment of visit for: bhargavi myles for   
human immunodeficiency virus            Medical New Patient with Doreen Cabrera CNP                              2021  
   
                                                    Diabetes Risk Test Score was  
 one score   
2021                               Medical New Patient with Doreen   
Deborah CNP                              2021  
   
                                        Hypertension        Medical New Patient   
with Doreen Cabrera [facility]                              2021  
   
                                        Morbid obesity      Medical New Patient   
with Doreen Cabrera [facility]                              2021  
   
                                        Post-traumatic stress disorder Medical N  
ew Patient with Doreen Cabrera [facility]                              2021  
   
                                                    Z68.42 - Body mass index [BM  
I]   
45.0-49.9, adult                        Medical New Patient with Doreen Cabrera [facility]                              2021  
  
[facility]  
Work Phone: [phone]- Evaluation note  
  
Includes: Assessments for all patient encounters  
  
  
  
                                Findings        Encounter       Date  
   
                                                    Bipolar affective disorder,   
current   
episode depressed, severe with   
psychosis                                Telebehavioral Health with Haylienicanor Martinerson [facility]                        2022  
   
                                                    Assessment of body mass inde  
x [Body   
mass index [BMI] 50.0-59.9, adult]      Open Access - Established with   
Julieth Aliya CNP                        2022  
   
                                                    Bipolar I disorder, most rec  
ent   
episode, manic                          Open Access - Established with   
Julieth Aliya CNP                        2022  
   
                                        Post-traumatic stress disorder  Establ  
ished Patient with Haylienicanor Martinerson [facility]                        2022  
   
                                        No cough            Medical Established   
Patient with   
Doreen Cabrera [facility]                        2022  
   
                                                    Z68.43 - Body mass index [BM  
I]   
50.0-59.9, adult                        Medical Established Patient with   
Doreen Cabrera [facility]                        2022  
   
                                                    Borderline personality disor  
danny Pt   
reported hx of sx/dx                     [facility]                        [facility]  
   
                                                    Assessment of body mass inde  
x [Body   
mass index [BMI] 50.0-59.9, adult]      Open Access - Established with   
Julieth Aliya CNP                        2022  
   
                                                    Diabetes Risk Test Score was  
 three   
score 2022                         Open Access - Established with   
Julieth Aliya CNP                        2022  
   
                                                    Bipolar I disorder, most rec  
ent   
episode, manic                           Established Patient with   
Avissharmila Mcgees [facility]                2021  
   
                                        Borderline personality disorder  Estab  
lished Patient with   
Avis Felder [facility]                2021  
   
                                        Post-traumatic stress disorder  Establ  
ished Patient with   
Avis Felder [facility]                2021  
   
                                                    Assessment of visit for: bhargavi myles for   
human immunodeficiency virus            Medical Established Patient with   
Doreen Deborah CNP                        2021  
   
                                        Nicotine dependence Medical Established   
Patient with   
Doreen Deborah CNP                        2021  
   
                                        Tachycardia         Medical Established   
Patient with   
Doreen Deborah CNP                        2021  
   
                                                    Z68.43 - Body mass index [BM  
I]   
50.0-59.9, adult                        Medical Established Patient with   
Doreen Deborah CNP                        2021  
   
                                                    Bipolar I disorder, most rec  
ent   
episode, manic                           Established Patient with Leonie   
Short [facility]                             2021  
   
                                                    Borderline personality disor  
danny per   
patient reported history                 Established Patient with Leonie   
Short [facility]                             2021  
   
                                        Nicotine dependence BH Established Patie  
nt with Leonie   
Short [facility]                             2021  
   
                                        Post-traumatic stress disorder  Establ  
ished Patient with Leonie   
Short [facility]                             2021  
   
                                        Body mass index     Medical Established   
Patient with   
Doreenpaola Mccormacken CNP                        2021  
   
                                        Morbid obesity      Medical Established   
Patient with   
Doreen Deborah CNP                        2021  
   
                                        Nicotine dependence uncomplicated Medica  
l Established Patient with   
Doreen Deborah CNP                        2021  
   
                                                    Z68.42 - Body mass index [BM  
I]   
45.0-49.9, adult                        Medical Established Patient with   
Doreen Deborah CNP                        2021  
   
                                Episodic mood disorders On Call with Pamela edwards CNP 2021  
   
                                                    Mood disorders, NOS per tabatha  
ent   
reported history                         Established Patient with Leonie   
Short [facility]                             2021  
   
                                        Post-traumatic stress disorder  Establ  
ished Patient with Leonie   
Short [facility]                             2021  
   
                                        Morbid obesity      Medical Established   
Patient with   
Doreen Deborah CNP                        2021  
   
                                        Otitis externa      Medical Established   
Patient with   
Doreen Deborah CNP                        2021  
   
                                                    Z68.42 - Body mass index [BM  
I]   
45.0-49.9, adult                        Medical Established Patient with   
Doreen Deborah CNP                        2021  
   
                                        Post-traumatic stress disorder  Establ  
ished Patient with Leonie   
Short [facility]                             06/10/2021  
   
                                        Morbid obesity      Medical Established   
Patient with   
Doreen Deborah CNP                        06/10/2021  
   
                                                    Z68.42 - Body mass index [BM  
I]   
45.0-49.9, adult                        Medical Established Patient with   
Doreen Deborah CNP                        06/10/2021  
   
                                        Post-traumatic stress disorder  Establ  
ished Patient with Leonie   
Short [facility]                             2021  
   
                                                    Assessment of visit for: bhargavi myles for   
human immunodeficiency virus            Medical New Patient with Doreen Cabrera LARISSA                              2021  
   
                                                    Diabetes Risk Test Score was  
 one score   
2021                               Medical New Patient with Doreen   
Deborah DIAS                              2021  
   
                                        Hypertension        Medical New Patient   
with Doreen Cabrera LARISSA                              2021  
   
                                        Morbid obesity      Medical New Patient   
with Doreen   
Deborah DIAS                              2021  
   
                                        Post-traumatic stress disorder Medical N  
ew Patient with Doreen Cabrera [facility]                              2021  
   
                                                    Z68.42 - Body mass index [BM  
I]   
45.0-49.9, adult                        Medical New Patient with Doreen Cabrera [facility]                              2021  
  
[facility]  
Work Phone: [phone]- Evaluation note  
  
Includes: Assessments for all patient encounters  
  
  
  
                                Findings        Encounter       Date  
   
                                                    Bipolar affective disorder,   
current   
episode manic                            [facility]  
   
                                                    Bipolar affective disorder,   
current   
episode depressed, severe with   
psychosis                                Telebehavioral Health with Haylienicanor Martinerson [facility]                        2022  
   
                                                    Assessment of body mass inde  
x [Body   
mass index [BMI] 50.0-59.9, adult]      Open Access - Established with   
Julieth Pisano CNP                        2022  
   
                                                    Bipolar I disorder, most rec  
ent   
episode, manic                          Open Access - Established with   
Julieth Aliya CNP                        2022  
   
                                        Post-traumatic stress disorder BH Establ  
ished Patient [facility]                        [facility]  
   
                                        No cough            Medical Established   
Patient with   
Doreen Deborah [facility]                        2022  
   
                                                    Z68.43 - Body mass index [BM  
I]   
50.0-59.9, adult                        Medical Established Patient with   
Doreen Deborah [facility]                        2022  
   
                                                    Borderline personality disor  
danny Pt   
reported hx of sx/dx                     [facility]  
   
                                                    Assessment of body mass inde  
x [Body   
mass index [BMI] 50.0-59.9, adult]      Open Access - Established with   
Julieth Aliyasteven DIAS                        2022  
   
                                                    Diabetes Risk Test Score was  
 three   
score 2022                         Open Access - Established with   
Julieth Aliya CNP                        2022  
   
                                                    Bipolar I disorder, most rec  
ent   
episode, manic                          BH Established Patient with   
Avis Felder [facility]                2021  
   
                                        Borderline personality disorder BH Estab  
lished Patient with   
Avis Felder [facility]                2021  
   
                                        Post-traumatic stress disorder BH Establ  
ished Patient with   
Avis Felder [facility]                2021  
   
                                                    Assessment of visit for: bhargavi myles for   
human immunodeficiency virus            Medical Established Patient with   
Doreen Deborah CNP                        2021  
   
                                        Nicotine dependence Medical Established   
Patient with   
Doreen Deborah CNP                        2021  
   
                                        Tachycardia         Medical Established   
Patient with   
Doreen Deborah CNP                        2021  
   
                                                    Z68.43 - Body mass index [BM  
I]   
50.0-59.9, adult                        Medical Established Patient with   
Doreen Deborah CNP                        2021  
   
                                                    Bipolar I disorder, most rec  
ent   
episode, manic                           Established Patient with Leonie   
Short [facility]                             2021  
   
                                                    Borderline personality disor  
danny per   
patient reported history                BH Established Patient with Leonie   
Short [facility]                             2021  
   
                                        Nicotine dependence BH Established Patie  
nt with Leonie   
Short L[facility]WS                             2021  
   
                                        Post-traumatic stress disorder BH Establ  
ished Patient with Leonie   
Short [facility]                             2021  
   
                                        Body mass index     Medical Established   
Patient with   
Doreen Deborah CNP                        2021  
   
                                        Morbid obesity      Medical Established   
Patient with   
Doreen Deborah CNP                        2021  
   
                                        Nicotine dependence uncomplicated Medica  
l Established Patient with   
Doreen Deborah CNP                        2021  
   
                                                    Z68.42 - Body mass index [BM  
I]   
45.0-49.9, adult                        Medical Established Patient with   
Doreen Deborah CNP                        2021  
   
                                Episodic mood disorders On Call with Pamela edwards CNP 2021  
   
                                                    Mood disorders, NOS per tabatha  
ent   
reported history                        BH Established Patient with Leonie   
Short [facility]                             2021  
   
                                        Post-traumatic stress disorder BH Establ  
ished Patient with Leonie   
Short [facility]                             2021  
   
                                        Morbid obesity      Medical Established   
Patient with   
Doreen Deborah CNP                        2021  
   
                                        Otitis externa      Medical Established   
Patient with   
Doreen Deborah CNP                        2021  
   
                                                    Z68.42 - Body mass index [BM  
I]   
45.0-49.9, adult                        Medical Established Patient with   
Doreen Deborah CNP                        2021  
   
                                        Post-traumatic stress disorder BH Establ  
ished Patient with Leonie   
Short LISWS                             06/10/2021  
   
                                        Morbid obesity      Medical Established   
Patient with   
Doreen Deborah CNP                        06/10/2021  
   
                                                    Z68.42 - Body mass index [BM  
I]   
45.0-49.9, adult                        Medical Established Patient with   
Doreen Deborah CNP                        06/10/2021  
   
                                        Post-traumatic stress disorder BH Establ  
ished Patient with Leonie   
Short [facility]                             2021  
   
                                                    Assessment of visit for: bhargavi myles for   
human immunodeficiency virus            Medical New Patient with Doreen   
Deborah CNP                              2021  
   
                                                    Diabetes Risk Test Score was  
 one score   
2021                               Medical New Patient with Doreen   
Deborah CNP                              2021  
   
                                        Hypertension        Medical New Patient   
with Doreen   
Deborah CNP                              2021  
   
                                        Morbid obesity      Medical New Patient   
with Doreen   
Deborah CNP                              2021  
   
                                        Post-traumatic stress disorder Medical N  
ew Patient with Doreen   
Deborah CNP                              2021  
   
                                                    Z68.42 - Body mass index [BM  
I]   
45.0-49.9, adult                        Medical New Patient with Doreen   
Deborah CNP                              2021  
  
[facility]  
Work Phone: [phone]- Evaluation note  
  
Includes: Assessments for all patient encounters  
  
  
  
                                Findings        Encounter       Date  
   
                                        Post-traumatic stress disorder BH Establ  
ished Patient with Haylienicanor Martinerson [facility]                        2022  
   
                                        No cough            Medical Established   
Patient with   
Doreen Deborah CNP                        2022  
   
                                                    Z68.43 - Body mass index [BM  
I]   
50.0-59.9, adult                        Medical Established Patient with   
Doreen Deborah CNP                        2022  
   
                                                    Borderline personality disor  
danny Pt   
reported hx of sx/dx                     Established Patient with Haylie   
Aguilar [facility]S                        2022  
   
                                                    Assessment of body mass inde  
x [Body mass   
index [BMI] 50.0-59.9, adult]           Open Access - Established with   
Julieth Pisano CNP                        2022  
   
                                                    Diabetes Risk Test Score was  
 three score   
2022                               Open Access - Established with   
Julieth Aliya CNP                        2022  
   
                                                    Bipolar I disorder, most rec  
ent episode,   
manic                                    Established Patient with   
Avis Felder [facility]                2021  
   
                                        Borderline personality disorder  Estab  
lished Patient with   
Avissharmila Felder [facility]S                2021  
   
                                        Post-traumatic stress disorder BH Establ  
ished Patient with   
Avis Felder [facility]                2021  
   
                                                    Assessment of visit for: bhargavi myels for   
human immunodeficiency virus            Medical Established Patient with   
Doreen Deborah CNP                        2021  
   
                                        Nicotine dependence Medical Established   
Patient with   
Doreen Deborah CNP                        2021  
   
                                        Tachycardia         Medical Established   
Patient with   
Doreen Deborah CNP                        2021  
   
                                                    Z68.43 - Body mass index [BM  
I]   
50.0-59.9, adult                        Medical Established Patient with   
Doreen Deborah CNP                        2021  
   
                                                    Bipolar I disorder, most rec  
ent episode,   
manic                                    Established Patient with   
Leonie Short LISWS                     2021  
   
                                                    Borderline personality disor  
danny per   
patient reported history                BH Established Patient with   
Leonie Short LISWS                     2021  
   
                                        Nicotine dependence BH Established Patie  
nt with   
Leonie Short LISWS                     2021  
   
                                        Post-traumatic stress disorder BH Establ  
ished Patient with   
Leonie Short LISWS                     2021  
   
                                        Body mass index     Medical Established   
Patient with   
Doreen Deborah CNP                        2021  
   
                                        Morbid obesity      Medical Established   
Patient with   
Doreen Deborah CNP                        2021  
   
                                        Nicotine dependence uncomplicated Medica  
l Established Patient with   
Doreen Deborah CNP                        2021  
   
                                                    Z68.42 - Body mass index [BM  
I]   
45.0-49.9, adult                        Medical Established Patient with   
Doreen Deborah CNP                        2021  
   
                                Episodic mood disorders On Call with Pamela edwards CNP 2021  
   
                                                    Mood disorders, NOS per tabatha  
ent reported   
history                                 BH Established Patient with   
Leonie Short LISWS                     2021  
   
                                        Post-traumatic stress disorder BH Establ  
ished Patient with   
Leonie Short LISWS                     2021  
   
                                        Morbid obesity      Medical Established   
Patient with   
Doreen Deborah CNP                        2021  
   
                                        Otitis externa      Medical Established   
Patient with   
Doreen Deborah CNP                        2021  
   
                                                    Z68.42 - Body mass index [BM  
I]   
45.0-49.9, adult                        Medical Established Patient with   
Doreen Deborah CNP                        2021  
   
                                        Post-traumatic stress disorder BH Establ  
ished Patient with   
Leonie Short LISWS                     06/10/2021  
   
                                        Morbid obesity      Medical Established   
Patient with   
Doreen Deborah CNP                        06/10/2021  
   
                                                    Z68.42 - Body mass index [BM  
I]   
45.0-49.9, adult                        Medical Established Patient with   
Doreen Cabrera [facility]                        06/10/2021  
   
                                        Post-traumatic stress disorder BH Establ  
ished Patient with   
Leonie Garrett [facility]                     2021  
   
                                                    Assessment of visit for: bhargavi guevaraning for   
human immunodeficiency virus            Medical New Patient with Doreen Cabrera [facility]                              2021  
   
                                                    Diabetes Risk Test Score was  
 one score   
2021                               Medical New Patient with Doreen Cabrera [facility]                              2021  
   
                                        Hypertension        Medical New Patient   
with Doreen Cabrera [facility]                              2021  
   
                                        Morbid obesity      Medical New Patient   
with Doreen Cabrera [facility]                              2021  
   
                                        Post-traumatic stress disorder Medical N  
ew Patient with Doreen Cabrera [facility]                              2021  
   
                                                    Z68.42 - Body mass index [BM  
I]   
45.0-49.9, adult                        Medical New Patient with Doreen Cabrera [facility]                              2021  
  
[facility]  
Work Phone: [phone]- History general Narrative - Reported  
  
Includes: Medical History in patient's chart  
  
  
  
                                        Description         Last Updated  
   
                                        Has sex without a condom 2022  
   
                                        Not planning to have a baby in the next   
12 months 2022  
   
                                        Partners sexually transmitted infection   
status known 2022  
   
                                        Previously pregnant 0 time(s) 2022  
   
                                        No previous hospitalizations 2022  
   
                                        Chronic illness     2021  
   
                                        Exposure to COVID-19 2021  
   
                                        History of gynecologic disorder 20  
21  
   
                                        History of Polycystic Ovarian Syndrome (  
PCOS) 2021  
   
                                        History of anxiety disorder NOS 20  
21  
   
                                        History of migraine headache 2021  
   
                                        History of psychiatric disorders biopola  
r disorder 2021  
  
[facility]  
Work Phone: [phone]- Evaluation note  
  
Includes: Assessments for all patient encounters  
  
  
  
                                Findings        Encounter       Date  
   
                                                    Borderline personality disor  
danny Pt   
reported hx of sx/dx                    BH Established Patient with Haylie Aguilar [facility]                        2022  
   
                                                    Assessment of body mass inde  
x [Body mass   
index [BMI] 50.0-59.9, adult]           Open Access - Established with   
Julieth Pisano CNP                        2022  
   
                                                    Diabetes Risk Test Score was  
 three score   
2022                               Open Access - Established with   
Julieth Pisano CNP                        2022  
   
                                                    Bipolar I disorder, most rec  
ent episode,   
manic                                    Established Patient with   
Avis Felder [facility]S                2021  
   
                                        Borderline personality disorder BH Estab  
lished Patient with   
Avis Felder LPCC-S                2021  
   
                                        Post-traumatic stress disorder BH Establ  
ished Patient with   
Avis Felder LPCC-S                2021  
   
                                                    Assessment of visit for: bhargavi myles for   
human immunodeficiency virus            Medical Established Patient with   
Doreen Deborah CNP                        2021  
   
                                        Nicotine dependence Medical Established   
Patient with   
Doreen Deborah CNP                        2021  
   
                                        Tachycardia         Medical Established   
Patient with   
Doreen Deborah CNP                        2021  
   
                                                    Z68.43 - Body mass index [BM  
I]   
50.0-59.9, adult                        Medical Established Patient with   
Doreen Deborah CNP                        2021  
   
                                                    Bipolar I disorder, most rec  
ent episode,   
manic                                    Established Patient with   
Leonie Short LISWS                     2021  
   
                                                    Borderline personality disor  
danny per   
patient reported history                BH Established Patient with   
Leonie Short LISWS                     2021  
   
                                        Nicotine dependence BH Established Patie  
nt with   
Leonie Short LISWS                     2021  
   
                                        Post-traumatic stress disorder  Establ  
ished Patient with   
Leonie Short LISWS                     2021  
   
                                        Body mass index     Medical Established   
Patient with   
Doreen Deborah CNP                        2021  
   
                                        Morbid obesity      Medical Established   
Patient with   
Doreen Deborah CNP                        2021  
   
                                        Nicotine dependence uncomplicated Medica  
l Established Patient with   
Doreen Deborah CNP                        2021  
   
                                                    Z68.42 - Body mass index [BM  
I]   
45.0-49.9, adult                        Medical Established Patient with   
Doreen Deborah CNP                        2021  
   
                                Episodic mood disorders On Call with Pamela edwards CNP 2021  
   
                                                    Mood disorders, NOS per tabatha  
ent reported   
history                                 BH Established Patient with   
Leonie Short LISWS                     2021  
   
                                        Post-traumatic stress disorder BH Establ  
ished Patient with   
Leonie Short LISWS                     2021  
   
                                        Morbid obesity      Medical Established   
Patient with   
Doreen Deborah CNP                        2021  
   
                                        Otitis externa      Medical Established   
Patient with   
Doreen Deborah CNP                        2021  
   
                                                    Z68.42 - Body mass index [BM  
I]   
45.0-49.9, adult                        Medical Established Patient with   
Doreen Cabrera CNP                        2021  
   
                                        Post-traumatic stress disorder BH Establ  
ished Patient with   
Leonie Short LISWS                     06/10/2021  
   
                                        Morbid obesity      Medical Established   
Patient with   
Doreenpaola Cabrera CNP                        06/10/2021  
   
                                                    Z68.42 - Body mass index [BM  
I]   
45.0-49.9, adult                        Medical Established Patient with   
Doreen Cabrera CNP                        06/10/2021  
   
                                        Post-traumatic stress disorder BH Establ  
ished Patient with   
Leonie Short LISWS                     2021  
   
                                                    Assessment of visit for: bhargavi myles for   
human immunodeficiency virus            Medical New Patient with Doreenpaola Mccormacken CNP                              2021  
   
                                                    Diabetes Risk Test Score was  
 one score   
2021                               Medical New Patient with Doreenpaola Cabrera CNP                              2021  
   
                                        Hypertension        Medical New Patient   
with Doreen   
Deborah CNP                              2021  
   
                                        Morbid obesity      Medical New Patient   
with Doreen Cabrera CNP                              2021  
   
                                        Post-traumatic stress disorder Medical N  
ew Patient with Doreen   
Deborah CNP                              2021  
   
                                                    Z68.42 - Body mass index [BM  
I]   
45.0-49.9, adult                        Medical New Patient with Doreenpaola Cabrera CNP                              2021  
  
[facility]  
Work Phone: [phone]- Reason for referral (narrative)*   
  
                          Date         Encounter Description Provider     Reason  
 for Referral  
   
                          22      Established Patient Haylie Aguilar GRIFFIN  
CC-S Referral To Mental Health  
 Team  
   
                          21     Medical New Patient Doreen Cabrera CNP Refe  
rral To Mental Health Team  
  
  
[facility]  
Work Phone: [phone]- History of Present illness Narrative* Rosie Goldberg - 10/04/2021 1:30 PM EDT  
  
Formatting of this note might be different from the original.  
Explained Holter monitor and diary.  
  
  
  
Electronically signed by Rosie Goldberg at 10/04/2021 2:23 PM EDT  
  
  
[facility]  
[facility] Phone: [phone]- Evaluation note  
  
Includes: Assessments for all patient encounters  
  
  
  
                                Findings        Encounter       Date  
   
                                                    Bipolar I disorder, most rec  
ent episode,   
manic                                   BH Established Patient with   
Avis Felder LPCC-S                2021  
   
                                        Borderline personality disorder BH Estab  
lished Patient with   
Avis Bandamons LPCC-S                2021  
   
                                                    Assessment of visit for: bhargavi myles for   
human immunodeficiency virus            Medical Established Patient with   
Doreen Deborah CNP                        2021  
   
                                        Nicotine dependence Medical Established   
Patient with   
Doreen Deborah CNP                        2021  
   
                                        Tachycardia         Medical Established   
Patient with   
Doreen Deborah CNP                        2021  
   
                                                    Z68.43 - Body mass index [BM  
I]   
50.0-59.9, adult                        Medical Established Patient with   
Doreen Deborah CNP                        2021  
   
                                                    Bipolar I disorder, most rec  
ent episode,   
manic                                    Established Patient with   
Leonie Short LISWS                     2021  
   
                                                    Borderline personality disor  
danny per   
patient reported history                 Established Patient with   
Leonie Short LISWS                     2021  
   
                                        Nicotine dependence BH Established Patie  
nt with   
Leonie Short LISWS                     2021  
   
                                        Post-traumatic stress disorder  Establ  
ished Patient with   
Leonie Short LISWS                     2021  
   
                                        Body mass index     Medical Established   
Patient with   
Doreen Deborah CNP                        2021  
   
                                        Morbid obesity      Medical Established   
Patient with   
Doreen Deborah CNP                        2021  
   
                                        Nicotine dependence uncomplicated Medica  
l Established Patient with   
Doreen Deborah CNP                        2021  
   
                                                    Z68.42 - Body mass index [BM  
I]   
45.0-49.9, adult                        Medical Established Patient with   
Doreen Deborah CNP                        2021  
   
                                Episodic mood disorders On Call with Pamela edwards CNP 2021  
   
                                                    Mood disorders, NOS per tabatha  
ent reported   
history                                  Established Patient with   
Leonie Short LISWS                     2021  
   
                                        Post-traumatic stress disorder  Establ  
ished Patient with   
Leonie Short LISWS                     2021  
   
                                        Morbid obesity      Medical Established   
Patient with   
Doreen Deborah CNP                        2021  
   
                                        Otitis externa      Medical Established   
Patient with   
Doreen Deborah CNP                        2021  
   
                                                    Z68.42 - Body mass index [BM  
I]   
45.0-49.9, adult                        Medical Established Patient with   
Doreen Deborah CNP                        2021  
   
                                        Post-traumatic stress disorder BH Establ  
ished Patient with   
Leonie Short LISWS                     06/10/2021  
   
                                        Morbid obesity      Medical Established   
Patient with   
Doreen Deborah CNP                        06/10/2021  
   
                                                    Z68.42 - Body mass index [BM  
I]   
45.0-49.9, adult                        Medical Established Patient with   
Doreen Deborah CNP                        06/10/2021  
   
                                        Post-traumatic stress disorder  Establ  
ished Patient with   
Leonie Short LISWS                     2021  
   
                                                    Assessment of visit for: scr  
eening for   
human immunodeficiency virus            Medical New Patient with Doreenpaola Mccormacken CNP                              2021  
   
                                                    Diabetes Risk Test Score was  
 one score   
2021                               Medical New Patient with Doreen   
Deborah CNP                              2021  
   
                                        Hypertension        Medical New Patient   
with Doreen   
Deborah CNP                              2021  
   
                                        Morbid obesity      Medical New Patient   
with Doreen   
Deborah CNP                              2021  
   
                                        Post-traumatic stress disorder Medical N  
ew Patient with Doreen   
Deborah CNP                              2021  
   
                                                    Z68.42 - Body mass index [BM  
I]   
45.0-49.9, adult                        Medical New Patient with Doreen   
Deborah CNP                              2021  
  
[facility]  
Work Phone: [phone]- Evaluation note  
  
Includes: Assessments for all patient encounters  
  
  
  
                                Findings        Encounter       Date  
   
                                                    Bipolar I disorder, most rec  
ent episode,   
manic                                    Established Patient with   
Avis Felder LPCC-S                2021  
   
                                        Borderline personality disorder  Estab  
lished Patient with   
Avis Felder LPCC-S                2021  
   
                                        Post-traumatic stress disorder  Establ  
ished Patient with   
Avis Felder LPCC-S                2021  
   
                                                    Assessment of visit for: scr  
eening for   
human immunodeficiency virus            Medical Established Patient with   
Doreen Deborah CNP                        2021  
   
                                        Nicotine dependence Medical Established   
Patient with   
Doreen Deborah CNP                        2021  
   
                                        Tachycardia         Medical Established   
Patient with   
Doreen Deborah CNP                        2021  
   
                                                    Z68.43 - Body mass index [BM  
I]   
50.0-59.9, adult                        Medical Established Patient with   
Doreen Deborah CNP                        2021  
   
                                                    Bipolar I disorder, most rec  
ent episode,   
manic                                    Established Patient with   
Leonie Short LISWS                     2021  
   
                                                    Borderline personality disor  
danny per   
patient reported history                BH Established Patient with   
Leonie Short LISWS                     2021  
   
                                        Nicotine dependence  Established Patie  
nt with   
Leonie Short LISWS                     2021  
   
                                        Post-traumatic stress disorder BH Establ  
ished Patient with   
Leonie Short LISWS                     2021  
   
                                        Body mass index     Medical Established   
Patient with   
Doreen Deborah CNP                        2021  
   
                                        Morbid obesity      Medical Established   
Patient with   
Doreen Deborah CNP                        2021  
   
                                        Nicotine dependence uncomplicated Medica  
l Established Patient with   
Doreen Deborah CNP                        2021  
   
                                                    Z68.42 - Body mass index [BM  
I]   
45.0-49.9, adult                        Medical Established Patient with   
Doreen Deborah CNP                        2021  
   
                                Episodic mood disorders On Call with Pamela edwards CNP 2021  
   
                                                    Mood disorders, NOS per tabatha  
ent reported   
history                                 BH Established Patient with   
Leonie Short LISWS                     2021  
   
                                        Post-traumatic stress disorder BH Establ  
ished Patient with   
Leonie Short LISWS                     2021  
   
                                        Morbid obesity      Medical Established   
Patient with   
Doreen Deborah CNP                        2021  
   
                                        Otitis externa      Medical Established   
Patient with   
Doreen Deborah CNP                        2021  
   
                                                    Z68.42 - Body mass index [BM  
I]   
45.0-49.9, adult                        Medical Established Patient with   
Doreen Deborah CNP                        2021  
   
                                        Post-traumatic stress disorder BH Establ  
ished Patient with   
Leonie Short LISWS                     06/10/2021  
   
                                        Morbid obesity      Medical Established   
Patient with   
Doreen Deborah CNP                        06/10/2021  
   
                                                    Z68.42 - Body mass index [BM  
I]   
45.0-49.9, adult                        Medical Established Patient with   
Doreen Deborah CNP                        06/10/2021  
   
                                        Post-traumatic stress disorder BH Establ  
ished Patient with   
Leonie Short LISWS                     2021  
   
                                                    Assessment of visit for: bhargavi myles for   
human immunodeficiency virus            Medical New Patient with Doreen   
Deborah CNP                              2021  
   
                                                    Diabetes Risk Test Score was  
 one score   
2021                               Medical New Patient with Doreen   
Deborah CNP                              2021  
   
                                        Hypertension        Medical New Patient   
with Doreen   
Deborah CNP                              2021  
   
                                        Morbid obesity      Medical New Patient   
with Doreen   
Deborah [facility]                              2021  
   
                                        Post-traumatic stress disorder Medical N  
ew Patient with Doreen   
Deborah CNP                              2021  
   
                                                    Z68.42 - Body mass index [BM  
I]   
45.0-49.9, adult                        Medical New Patient with Doreen   
Deborah [facility]                              2021  
  
[facility]  
Work Phone: [phone]- Evaluation note  
  
Includes: Assessments for all patient encounters  
  
  
  
                                Findings        Encounter       Date  
   
                                                    Bipolar I disorder, most rec  
ent episode,   
manic                                    Established Patient with   
Leonie Short LISWS                     2021  
   
                                                    Borderline personality disor  
danny per   
patient reported history                 Established Patient with   
Leonie Short LISWS                     2021  
   
                                        Nicotine dependence  Established Patie  
nt with   
Leonie Short LISWS                     2021  
   
                                        Post-traumatic stress disorder  Establ  
ished Patient with   
Leonie Short LISWS                     2021  
   
                                        Body mass index     Medical Established   
Patient with   
Doreen Deborah CNP                        2021  
   
                                        Morbid obesity      Medical Established   
Patient with   
Doreen Deborah CNP                        2021  
   
                                        Nicotine dependence uncomplicated Medica  
l Established Patient with   
Doreen Deborah CNP                        2021  
   
                                                    Z68.42 - Body mass index [BM  
I]   
45.0-49.9, adult                        Medical Established Patient with   
Doreen Deborah CNP                        2021  
   
                                Episodic mood disorders On Call with Pamela edwards CNP 2021  
   
                                                    Mood disorders, NOS per tabatha  
ent reported   
history                                  Established Patient with   
Leonie Short LISWS                     2021  
   
                                        Post-traumatic stress disorder  Establ  
ished Patient with   
Leonie Short LISWS                     2021  
   
                                        Morbid obesity      Medical Established   
Patient with   
Doreen Deborah CNP                        2021  
   
                                        Otitis externa      Medical Established   
Patient with   
Doreen Deborah CNP                        2021  
   
                                                    Z68.42 - Body mass index [BM  
I]   
45.0-49.9, adult                        Medical Established Patient with   
Doreen Deborah CNP                        2021  
   
                                        Post-traumatic stress disorder  Establ  
ished Patient with   
Leonie Short LISWS                     06/10/2021  
   
                                        Morbid obesity      Medical Established   
Patient with   
Doreen Deborah CNP                        06/10/2021  
   
                                                    Z68.42 - Body mass index [BM  
I]   
45.0-49.9, adult                        Medical Established Patient with   
Doreen Deborah CNP                        06/10/2021  
   
                                        Post-traumatic stress disorder  Establ  
ished Patient with   
Leonie Short LISWS                     2021  
   
                                                    Assessment of visit for: bhargavi myles for   
human immunodeficiency virus            Medical New Patient with Doreen   
Deborah CNP                              2021  
   
                                                    Diabetes Risk Test Score was  
 one score   
2021                               Medical New Patient with Doreen Cabrera CNP                              2021  
   
                                        Hypertension        Medical New Patient   
with Doreen Cabrera CNP                              2021  
   
                                        Morbid obesity      Medical New Patient   
with Doreenpaola Mccormacken CNP                              2021  
   
                                        Post-traumatic stress disorder Medical N  
ew Patient with Doreen   
Deborah CNP                              2021  
   
                                                    Z68.42 - Body mass index [BM  
I]   
45.0-49.9, adult                        Medical New Patient with Doreen Cabrera CNP                              2021  
  
[facility]  
Work Phone: [phone]- History general Narrative - Reported  
  
Includes: Medical History in patient's chart  
  
  
  
                                        Description         Last Updated  
   
                                        Chronic illness     2021  
   
                                        Exposure to COVID-19 2021  
   
                                        Previous hospitalizations 2021  
   
                                        History of gynecologic disorder 20  
21  
   
                                        History of Polycystic Ovarian Syndrome (  
PCOS) 2021  
   
                                        History of anxiety disorder NOS 20  
21  
   
                                        History of migraine headache 2021  
   
                                        History of psychiatric disorders biopola  
r disorder 2021  
  
[facility]  
Work Phone: [phone]- Evaluation note  
  
Includes: Assessments for all patient encounters  
  
  
  
                                Findings        Encounter       Date  
   
                                Episodic mood disorders On Call with Pamela edwards CNP 2021  
   
                                                    Mood disorders, NOS per tabatha  
ent reported   
history                                  Established Patient with   
Loenie Short LISWS                     2021  
   
                                        Post-traumatic stress disorder  Establ  
ished Patient with   
Lenoie Short LISWS                     2021  
   
                                        Morbid obesity      Medical Established   
Patient with   
Doreenpaola Mccormacken CNP                        2021  
   
                                        Otitis externa      Medical Established   
Patient with   
Doreen Deborah CNP                        2021  
   
                                                    Z68.42 - Body mass index [BM  
I]   
45.0-49.9, adult                        Medical Established Patient with   
Doreen Deborah CNP                        2021  
   
                                        Post-traumatic stress disorder BH Establ  
ished Patient with   
Leonie Short LISWS                     06/10/2021  
   
                                        Morbid obesity      Medical Established   
Patient with   
Doreen Deborah CNP                        06/10/2021  
   
                                                    Z68.42 - Body mass index [BM  
I]   
45.0-49.9, adult                        Medical Established Patient with   
Doreen Deborah CNP                        06/10/2021  
   
                                        Post-traumatic stress disorder BH Establ  
ished Patient with   
Leonie Short LISWS                     2021  
   
                                                    Assessment of visit for: bhargavi myles for   
human immunodeficiency virus            Medical New Patient with Doreen   
Deborah CNP                              2021  
   
                                                    Diabetes Risk Test Score was  
 one score   
2021                               Medical New Patient with Doreen   
Deborah CNP                              2021  
   
                                        Hypertension        Medical New Patient   
with Doreen   
Deborah CNP                              2021  
   
                                        Morbid obesity      Medical New Patient   
with Doreen   
Deborah CNP                              2021  
   
                                        Post-traumatic stress disorder Medical N  
ew Patient with Doreen   
Deborah CNP                              2021  
   
                                                    Z68.42 - Body mass index [BM  
I]   
45.0-49.9, adult                        Medical New Patient with Doreen   
Deborah CNP                              2021  
  
[facility]  
Work Phone: [phone]- Evaluation note  
  
Includes: Assessments for all patient encounters  
  
  
  
                                Findings        Encounter       Date  
   
                                                    Mood disorders, NOS per tabatha  
ent reported   
history                                 BH Established Patient with   
Leonie Short LISWS                     2021  
   
                                        Post-traumatic stress disorder BH Establ  
ished Patient with   
Leonie Short LISWS                     2021  
   
                                        Morbid obesity      Medical Established   
Patient with   
Doreen Deborah CNP                        2021  
   
                                        Otitis externa      Medical Established   
Patient with   
Doreen Deborah CNP                        2021  
   
                                                    Z68.42 - Body mass index [BM  
I]   
45.0-49.9, adult                        Medical Established Patient with   
Doreen Deborah CNP                        2021  
   
                                        Post-traumatic stress disorder BH Establ  
ished Patient with   
Leonie Short LISWS                     06/10/2021  
   
                                        Morbid obesity      Medical Established   
Patient with   
Doreen Deborah CNP                        06/10/2021  
   
                                                    Z68.42 - Body mass index [BM  
I]   
45.0-49.9, adult                        Medical Established Patient with   
Doreen Deborah CNP                        06/10/2021  
   
                                        Post-traumatic stress disorder BH Establ  
ished Patient with   
Leonie Short LISWS                     2021  
   
                                                    Assessment of visit for: bhargavi myles for   
human immunodeficiency virus            Medical New Patient with Doreen   
Deborah CNP                              2021  
   
                                                    Diabetes Risk Test Score was  
 one score   
2021                               Medical New Patient with Doreen   
Deborah CNP                              2021  
   
                                        Hypertension        Medical New Patient   
with Doreen   
Deborah CNP                              2021  
   
                                        Morbid obesity      Medical New Patient   
with Doreen   
Deborah CNP                              2021  
   
                                        Post-traumatic stress disorder Medical N  
ew Patient with Doreen   
Deborah CNP                              2021  
   
                                                    Z68.42 - Body mass index [BM  
I]   
45.0-49.9, adult                        Medical New Patient with Doreen Cabrera CNP                              2021  
  
[facility]  
Work Phone: [phone]- History general Narrative - Reported  
  
Includes: Medical History in patient's chart  
  
  
  
                                        Description         Last Updated  
   
                                        Exposure to COVID-19 2021  
   
                                        Previous hospitalizations 2021  
   
                                        History of gynecologic disorder 20  
21  
   
                                        History of Polycystic Ovarian Syndrome (  
PCOS) 2021  
   
                                        History of anxiety disorder NOS 20  
21  
   
                                        History of migraine headache 2021  
   
                                        History of psychiatric disorders biopola  
r disorder 2021  
  
[facility]  
Work Phone: [phone]- History general Narrative - Reported  
  
Includes: Medical History in patient's chart  
  
  
  
                                        Description         Last Updated  
   
                                        Chronic illness     2021  
   
                                        Exposure to COVID-19 2021  
   
                                        Previous hospitalizations 2021  
   
                                        History of gynecologic disorder 20  
21  
   
                                        History of Polycystic Ovarian Syndrome (  
PCOS) 2021  
   
                                        History of anxiety disorder NOS 20  
21  
   
                                        History of migraine headache 2021  
   
                                        History of psychiatric disorders biopola  
r disorder 2021  
  
[facility]  
Work Phone: [phone]- Evaluation note  
  
Includes: Assessments for all patient encounters  
  
  
  
                                Findings        Encounter       Date  
   
                                        Morbid obesity      Medical Established   
Patient with   
Doreen Cabrera CNP                        06/10/2021  
   
                                                    Z68.42 - Body mass index [BM  
I]   
45.0-49.9, adult                        Medical Established Patient with   
Doreen Cabrera CNP                        06/10/2021  
   
                                        Post-traumatic stress disorder  Establ  
ished Patient with   
Leonie Short LISWS                     2021  
   
                                                    Assessment of visit for: bhargavi myles for   
human immunodeficiency virus            Medical New Patient with Doreen Cabrera CNP                              2021  
   
                                                    Diabetes Risk Test Score was  
 one score   
2021                               Medical New Patient with Doreen Cabrera CNP                              2021  
   
                                        Hypertension        Medical New Patient   
with Doreen Cabrera CNP                              2021  
   
                                        Morbid obesity      Medical New Patient   
with Doreen Cabrera CNP                              2021  
   
                                        Post-traumatic stress disorder Medical N  
ew Patient with Doreen Cabrera CNP                              2021  
   
                                                    Z68.42 - Body mass index [BM  
I]   
45.0-49.9, adult                        Medical New Patient with Doreen Cabrera CNP                              2021  
  
[facility]  
Work Phone: [phone]- Evaluation note  
  
Includes: Assessments for all patient encounters  
  
  
  
                                Findings        Encounter       Date  
   
                                        Post-traumatic stress disorder BH Establ  
ished Patient with   
Leonie Short LISWS                     2021  
   
                                                    Assessment of visit for: bhargavi myles for   
human immunodeficiency virus            Medical New Patient with Doreen Cabrera CNP                              2021  
   
                                                    Diabetes Risk Test Score was  
 one score   
2021                               Medical New Patient with Doreen Cabrera CNP                              2021  
   
                                        Hypertension        Medical New Patient   
with Doreen Cabrera CNP                              2021  
   
                                        Morbid obesity      Medical New Patient   
with Doreen Cabrera CNP                              2021  
   
                                        Post-traumatic stress disorder Medical N  
ew Patient with Doreen Cabrera CNP                              2021  
   
                                                    Z68.42 - Body mass index [BM  
I]   
45.0-49.9, adult                        Medical New Patient with Doreen Cabrera CNP                              2021  
  
[facility]  
Work Phone: [phone]- History general Narrative - Reported  
  
Includes: Medical History in patient's chart  
  
  
  
                                        Description         Last Updated  
   
                                        History of gynecologic disorder 20  
21  
   
                                        History of Polycystic Ovarian Syndrome (  
PCOS) 2021  
   
                                        History of anxiety disorder NOS 20  
21  
   
                                        History of migraine headache 2021  
   
                                        History of psychiatric disorders biopola  
r disorder 2021  
  
[facility]  
Work Phone: [phone]- History general Narrative - Reported  
  
Includes: Medical History in patient's chart  
  
  
  
                                        Description         Last Updated  
   
                                        Exposure to COVID-19 2021  
   
                                        Previous hospitalizations 2021  
   
                                        History of gynecologic disorder 20  
21  
   
                                        History of Polycystic Ovarian Syndrome (  
PCOS) 2021  
   
                                        History of anxiety disorder NOS 20  
21  
   
                                        History of migraine headache 2021  
   
                                        History of psychiatric disorders biopola  
r disorder 2021  
  
[facility]  
Work Phone: [phone] note*   
  
                                                    Diagnosis  
   
                                                      
  
  
Depression with suicidal ideation- Primary  
  
documented in this encounter  
[employer] Phone: [phone] note  
  
Includes: Assessments for all patient encounters  
  
  
  
                                Findings        Encounter       Date  
   
                                        Morbid obesity      Medical Established   
Patient with   
Doreen Deborah CNP                        2021  
   
                                        Otitis externa      Medical Established   
Patient with   
Doreen Deborah CNP                        2021  
   
                                                    Z68.42 - Body mass index [BM  
I]   
45.0-49.9, adult                        Medical Established Patient with   
Doreen Deborah CNP                        2021  
   
                                        Post-traumatic stress disorder BH Establ  
ished Patient with   
Leonie Short LISWS                     06/10/2021  
   
                                        Morbid obesity      Medical Established   
Patient with   
Doreen Deborah CNP                        06/10/2021  
   
                                                    Z68.42 - Body mass index [BM  
I]   
45.0-49.9, adult                        Medical Established Patient with   
Doreen Deborah CNP                        06/10/2021  
   
                                        Post-traumatic stress disorder BH Establ  
ished Patient with   
Leonie Short LISWS                     2021  
   
                                                    Assessment of visit for: bhargavi myles for   
human immunodeficiency virus            Medical New Patient with Doreen   
Deborah CNP                              2021  
   
                                                    Diabetes Risk Test Score was  
 one score   
2021                               Medical New Patient with Doreen   
Deborah CNP                              2021  
   
                                        Hypertension        Medical New Patient   
with Doreen   
Deborah CNP                              2021  
   
                                        Morbid obesity      Medical New Patient   
with Doreen   
Deborah CNP                              2021  
   
                                        Post-traumatic stress disorder Medical N  
ew Patient with Doreen   
Deborah CNP                              2021  
   
                                                    Z68.42 - Body mass index [BM  
I]   
45.0-49.9, adult                        Medical New Patient with Doreen   
Deborah CNP                              2021  
  
[facility]  
Work Phone: [phone] note*   
  
                                                    Diagnosis  
   
                                                      
  
  
Anxiety state- Primary  
  
  
Anxiety state, unspecified  
  
documented in this encounter  
[employer] Phone: [phone] note*   
  
                                                    Diagnosis  
   
                                                      
  
  
Bipolar 1 disorder (HCC)- Primary  
  
  
Bipolar I disorder, most recent episode (or current) unspecified  
   
                                                      
  
  
Homicidal ideation  
  
documented in this encounter  
[employer] Phone: [phone] note*   
  
                                                    Diagnosis  
   
                                                      
  
  
Tachycardia  
  
  
Tachycardia, unspecified  
  
documented in this encounter  
[employer] Phone: [phone] note  
  
Includes: Assessments for all patient encounters  
  
  
  
                                Findings        Encounter       Date  
   
                                                    [D50.9 - Iron deficiency ane  
jennifer,   
unspecified] iron deficiency anemia Chart Update with Doreen Cabrera CNP   
10/07/2024  
  
  
  
                                                    Last Documented On 10/07/202  
4 12:07PM ; [facility]  
  
  
  
                                        Attention-deficit hyperactivity disorder  
  Established Patient with Radha Young LSW                               2024  
  
  
  
                                                    Last Documented On   
4 4:15PM ; [facility]  
  
  
  
                                                    Bipolar I disorder, most rec  
ent   
episode, depressed - mild               BH Established Patient with Radha   
Young LSW                               2024  
  
  
  
                                                    Last Documented On   
4 4:15PM ; [facility]  
  
  
  
                                Nicotine dependence  Established Patient with   
Radha Young W 2024  
  
  
  
                                                    Last Documented On   
4 4:15PM ; [facility]  
  
  
  
                                        Post-traumatic stress disorder  Establ  
ished Patient with Radha Young   
W                                     2024  
  
  
  
                                                    Last Documented On   
4 4:15PM ; [facility]  
  
  
  
                                                    [Z68.43 - Body mass index [B  
MI]   
50.0-59.9, adult] assessment of body   
mass index                              Medical Established Patient with   
Doreen Cabrera CNP                        2024  
  
  
  
                                                    Last Documented On 10/04/202  
4 1:56PM ; [facility]  
  
  
  
                                                    Bipolar I disorder, most rec  
ent   
episode, depressed - mild               Medical Established Patient with Doreenpaola Cabrera CNP                              2024  
  
  
  
                                                    Last Documented On 10/04/202  
4 1:56PM ; [facility]  
  
  
  
                                Caries          Medical Established Patient with  
 Doreen Cabrera CNP 2024  
  
  
  
                                                    Last Documented On 10/04/202  
4 1:56PM ; [facility]  
  
  
  
                                        Encounter for Immunization Medical Estab  
lished Patient with Doreen Cabrera   
CNP                                     2024  
  
  
  
                                                    Last Documented On 10/04/202  
4 1:56PM ; [facility]  
  
  
  
                                                    Type 2 diabetes mellitus wit  
hout   
complication                            Medical Established Patient with   
Doreenpaola Mccormacken CNP                        2024  
  
  
  
                                                    Last Documented On 10/04/202  
4 1:56PM ; [facility]  
  
  
  
                                        Attention-deficit hyperactivity disorder  
  Established Patient with   
Leonie Short LISWS                     2024  
  
  
  
                                                    Last Documented On   
4 3:28PM ; [facility]  
  
  
  
                                                    Bipolar I disorder, most rec  
ent   
episode, depressed - mild                Established Patient with Leonie   
Short LISWS                             2024  
  
  
  
                                                    Last Documented On   
4 3:28PM ; [facility]  
  
  
  
                                        Post-traumatic stress disorder BH Establ  
ished Patient with Leonie Short   
LISWS                                   2024  
  
  
  
                                                    Last Documented On   
4 3:28PM ; [facility]  
  
  
  
                                                    [E11.9 - Type 2 diabetes lobito  
litus   
without complications] type 2 diabetes   
mellitus                                Medical Established Patient with   
Doreen Cabrera CNP                        2024  
  
  
  
                                                    Last Documented On   
4 7:36PM ; [facility]  
  
  
  
                                                    [F41.1 - Generalized anxiety  
   
disorder] generalized anxiety   
disorder                                Medical Established Patient with   
Doreen Cabrera CNP                        2024  
  
  
  
                                                    Last Documented On   
4 7:36PM ; [facility]  
  
  
  
                                                    [I10 - Essential (primary)   
hypertension] essential hypertension    Medical Established Patient with   
Doreen Cabrera CNP                        2024  
  
  
  
                                                    Last Documented On   
4 7:36PM ; [facility]  
  
  
  
                                                    [N94.6 - Dysmenorrhea, unspe  
cified]   
dysmenorrhea                            Medical Established Patient with   
Doreen Cabrera CNP                        2024  
  
  
  
                                                    Last Documented On   
4 7:36PM ; [facility]  
  
  
  
                                                    [Z68.43 - Body mass index [B  
MI]   
50.0-59.9, adult] assessment of body   
mass index                              Medical Established Patient with   
Doreen Cabrera CNP                        2024  
  
  
  
                                                    Last Documented On   
4 7:36PM ; [facility]  
  
  
  
                                        Encounter for Immunization Medical Estab  
lished Patient with Doreen Cabrera   
CNP                                     2024  
  
  
  
                                                    Last Documented On   
4 7:36PM ; [facility]  
  
  
  
                                        Venipuncture was performed Medical Estab  
lished Patient with Doreen Cabrera   
CNP                                     2024  
  
  
  
                                                    Last Documented On   
4 7:36PM ; [facility]  
  
  
  
                                        Attention-deficit hyperactivity disorder  
  Established Patient with   
Leonie Short LISWS                     2024  
  
  
  
                                                    Last Documented On   
4 10:10AM ; [facility]  
  
  
  
                                                    Bipolar I disorder, most rec  
ent   
episode, depressed - mild                Established Patient with Leonie   
Short LISWS                             2024  
  
  
  
                                                    Last Documented On   
4 10:10AM ; [facility]  
  
  
  
                                        Post-traumatic stress disorder  Establ  
ished Patient with Leonie Short   
LISWS                                   2024  
  
  
  
                                                    Last Documented On   
4 10:10AM ; [facility]  
  
  
  
                                        [D64.9 - Anemia, unspecified] anemia Med  
ical Established Patient with   
Doreenpaola Cabrera CNP                        2024  
  
  
  
                                                    Last Documented On   
4 6:51PM ; [facility]  
  
  
  
                                                    [Z68.43 - Body mass index [B  
MI]   
50.0-59.9, adult] assessment of body   
mass index                              Medical Established Patient with   
Doreen Cabrera CNP                        2024  
  
  
  
                                                    Last Documented On   
4 6:51PM ; [facility]  
  
  
  
                                                    Bipolar affective disorder,   
current   
episode depressed, mild                  Established Patient with Leonie   
Short LISWS                             2024  
  
  
  
                                                    Last Documented On   
4 5:16PM ; [facility]  
  
  
  
                                        Post-traumatic stress disorder  Establ  
ished Patient with Leonie Short   
LISWS                                   2024  
  
  
  
                                                    Last Documented On   
4 5:16PM ; [facility]  
  
  
  
                                                    Undifferentiated attention d  
eficit   
disorder                                 Established Patient with   
Leonie Short LISWS                     2024  
  
  
  
                                                    Last Documented On   
4 5:16PM ; [facility]  
  
  
  
                                        Visit for: screening for disorder  Est  
ablished Patient with Leonie   
Short LISWS                             2024  
  
  
  
                                                    Last Documented On   
4 5:16PM ; [facility]  
  
  
  
                                                    [Z68.43 - Body mass index [B  
MI]   
50.0-59.9, adult] assessment of body   
mass index                              Medical Established Patient with   
Doreen Cabrera CNP                        2024  
  
  
  
                                                    Last Documented On   
4 7:50PM ; [facility]  
  
  
  
                                        Attention-deficit hyperactivity disorder  
 Medical Established Patient with   
Doreenpaola Mccormacken CNP                        2024  
  
  
  
                                                    Last Documented On   
4 7:50PM ; [facility]  
  
  
  
                                                    Diabetes Risk Test Score was  
 three   
score 3/27/2024                         Medical Established Patient with Doreenpaola Cabrera CNP                              2024  
  
  
  
                                                    Last Documented On   
4 7:50PM ; [facility]  
  
  
  
                                        Visit for: screening for STD Medical Est  
ablished Patient with Doreenpaola Cabrera   
CNP                                     2024  
  
  
  
                                                    Last Documented On   
4 7:50PM ; [facility]  
  
  
  
                                                    [Z68.32 - Body mass index [B  
MI]   
32.0-32.9, adult] assessment of body   
mass index                              Medical Established Patient with   
Doreen Cabrera CNP                        2023  
  
  
  
                                                    Last Documented On   
3 6:01PM ; [facility]  
  
  
  
                                        Borderline personality disorder Medical   
Established Patient with Doreenpaola Cabrera CNP                              2023  
  
  
  
                                                    Last Documented On   
3 6:01PM ; [facility]  
  
  
  
                                        Screening for diabetes mellitus Medical   
Established Patient with Doreenpaola Mccormacken CNP                              2023  
  
  
  
                                                    Last Documented On   
3 6:01PM ; [facility]  
  
  
  
                                        Assessment of body mass index Medical Es  
tablished Patient with Doreen Cabrera CNP                              10/21/2022  
  
  
  
                                                    Last Documented On 10/21/202  
2 2:45PM ; [facility]  
  
  
  
                                                    Bipolar affective disorder,   
current   
episode manic                            Telebehavioral Health with Haylie   
Aguilar LPCC-S                        2022  
  
  
  
                                                    Last Documented On   
2 3:22PM ; [facility]  
  
  
  
                                                    Bipolar affective disorder,   
current   
episode manic                            Established Patient with Haylie   
Aguilar LPCC-S                        2022  
  
  
  
                                                    Last Documented On   
2 2:28PM ; [facility]  
  
  
  
                                                    Bipolar affective disorder,   
current   
episode depressed, severe with   
psychosis                                Telebehavioral Health with Haylie   
Aguilar LPCC-S                        2022  
  
  
  
                                                    Last Documented On   
2 3:29PM ; [facility]  
  
  
  
                                                    Assessment of body mass inde  
x [Body   
mass index [BMI] 50.0-59.9, adult]      Open Access - Established with Julieth   
Aliya CNP                               2022  
  
  
  
                                                    Last Documented On   
2 9:36AM ; [facility]  
  
  
  
                                                    Bipolar I disorder, most rec  
ent   
episode, manic                          Open Access - Established with Julieth   
Aliya CNP                               2022  
  
  
  
                                                    Last Documented On   
2 9:36AM ; [facility]  
  
  
  
                                        Post-traumatic stress disorder  Establ  
ished Patient with Haylie Aguilar   
LPCC-S                                  2022  
  
  
  
                                                    Last Documented On   
2 3:20PM ; [facility]  
  
  
  
                                No cough        Medical Established Patient with  
 Doreenpaola Cabrera CNP 2022  
  
  
  
                                                    Last Documented On   
2 4:04PM ; [facility]  
  
  
  
                                                    Z68.43 - Body mass index [BM  
I]   
50.0-59.9, adult                        Medical Established Patient with   
Doreenpaola Cabrera [facility]                        2022  
  
  
  
                                                    Last Documented On   
2 4:04PM ; [facility]  
  
  
  
                                                    Borderline personality disor  
danny Pt   
reported hx of sx/dx                     Established Patient with Haylie Aguilar LPCC-S                        2022  
  
  
  
                                                    Last Documented On   
2 4:08PM ; [facility]  
  
  
  
                                                    Assessment of body mass inde  
x [Body   
mass index [BMI] 50.0-59.9, adult]      Open Access - Established with Julieth Pisano CNP                               2022  
  
  
  
                                                    Last Documented On   
2 7:41PM ; [facility]  
  
  
  
                                                    Diabetes Risk Test Score was  
 three   
score 2022                         Open Access - Established with Julieth   
Aliya CNP                               2022  
  
  
  
                                                    Last Documented On   
2 7:41PM ; [facility]  
  
  
  
                                                    Bipolar I disorder, most rec  
ent   
episode, manic                           [facility]                          [facility]  
  
  
  
                                                    Last Documented On   
1 1:35AM ; [facility]  
  
  
  
                                        Borderline personality disorder  Estab  
lished Patient with Avis   
Felder LPCC-S                          2021  
  
  
  
                                                    Last Documented On   
1 1:35AM ; [facility]  
  
  
  
                                        Post-traumatic stress disorder  Establ  
ished Patient [facility]   
[facility]                          20[facility]  
  
  
  
                                                    Last Documented On   
1 1:35AM ; [facility]  
  
  
  
                                                    Assessment of visit for: bhargavi myles for   
human immunodeficiency virus            Medical Established Patient with   
Doreenpaola Mccormacken [facility]                        2021  
  
  
  
                                                    Last Documented On   
1 2:56PM ; [facility]  
  
  
  
                                Nicotine dependence Medical Established Patient   
with Doreen Deborah CNP 2021  
  
  
  
                                                    Last Documented On   
1 2:56PM ; [facility]  
  
  
  
                                Tachycardia     Medical Established Patient with  
 Doreen Deborah CNP 2021  
  
  
  
                                                    Last Documented On   
1 2:56PM ; [facility]  
  
  
  
                                                    Z68.43 - Body mass index [BM  
I]   
50.0-59.9, adult                        Medical Established Patient with   
Doreen Cabrera CNP                        2021  
  
  
  
                                                    Last Documented On   
1 2:56PM ; [facility]  
  
  
  
                                                    Bipolar I disorder, most rec  
ent   
episode, manic                           Established Patient with Leonie   
Short LISWS                             2021  
  
  
  
                                                    Last Documented On   
1 10:17AM ; [facility]  
  
  
  
                                                    Borderline personality disor  
danny per   
patient reported history                 Established Patient with Leonie   
Short LISWS                             2021  
  
  
  
                                                    Last Documented On   
1 10:17AM ; [facility]  
  
  
  
                                Nicotine dependence  Established Patient with   
Elonie Short LISWS 2021  
  
  
  
                                                    Last Documented On   
1 10:17AM ; [facility]  
  
  
  
                                        Post-traumatic stress disorder  Establ  
ished Patient with Leonie Short   
LISWS                                   2021  
  
  
  
                                                    Last Documented On   
1 10:17AM ; [facility]  
  
  
  
                                Body mass index Medical Established Patient with  
 Doreen Cabrera CNP 2021  
  
  
  
                                                    Last Documented On   
1 5:23PM ; [facility]  
  
  
  
                                Morbid obesity  Medical Established Patient with  
 Doreen Deborah CNP 2021  
  
  
  
                                                    Last Documented On   
1 5:23PM ; [facility]  
  
  
  
                                        Nicotine dependence uncomplicated Medica  
l Established Patient with Doreenpaola Cabrera CNP                              2021  
  
  
  
                                                    Last Documented On   
1 5:23PM ; [facility]  
  
  
  
                                                    Z68.42 - Body mass index [BM  
I]   
45.0-49.9, adult                        Medical Established Patient with   
Doreen Cabrera CNP                        2021  
  
  
  
                                                    Last Documented On   
1 5:23PM ; [facility]  
  
  
  
                                Episodic mood disorders On Call with Pamela edwards CNP 2021  
  
  
  
                                                    Last Documented On   
1 7:49AM ; [facility]  
  
  
  
                                                    Mood disorders, NOS per tabatha  
ent   
reported history                         Established Patient with Leonie   
Short LISWS                             2021  
  
  
  
                                                    Last Documented On   
1 7:15PM ; [facility]  
  
  
  
                                        Post-traumatic stress disorder  Establ  
ished Patient with Leonie Short   
LISWS                                   2021  
  
  
  
                                                    Last Documented On   
1 7:15PM ; [facility]  
  
  
  
                                Morbid obesity  Medical Established Patient with  
 Doreen Deborah CNP 2021  
  
  
  
                                                    Last Documented On  12:31PM ; [facility]  
  
  
  
                                Otitis externa  Medical Established Patient with  
 Doreen Deborah CNP 2021  
  
  
  
                                                    Last Documented On  12:31PM ; [facility]  
  
  
  
                                                    Z68.42 - Body mass index [BM  
I]   
45.0-49.9, adult                        Medical Established Patient with   
Doreen Deborah CNP                        2021  
  
  
  
                                                    Last Documented On   
1 12:31PM ; [facility]  
  
  
  
                                        Post-traumatic stress disorder  Establ  
ished Patient with Leonie Short   
LISWS                                   06/10/2021  
  
  
  
                                                    Last Documented On 06/10/202  
1 10:05PM ; [facility]  
  
  
  
                                Morbid obesity  Medical Established Patient with  
 Doreen Deborah CNP 06/10/2021  
  
  
  
                                                    Last Documented On 06/10/202  
1 4:45PM ; [facility]  
  
  
  
                                                    Z68.42 - Body mass index [BM  
I]   
45.0-49.9, adult                        Medical Established Patient with   
Doreen Deborah CNP                        06/10/2021  
  
  
  
                                                    Last Documented On 06/10/202  
1 4:45PM ; [facility]  
  
  
  
                                        Post-traumatic stress disorder  Establ  
ished Patient with Leonie Short   
LISWS                                   2021  
  
  
  
                                                    Last Documented On   
1 11:59AM ; [facility]  
  
  
  
                                                    Assessment of visit for: scr  
eening for   
human immunodeficiency virus            Medical New Patient with Doreen   
Deborah CNP                              2021  
  
  
  
                                                    Last Documented On   
1 4:06PM ; [facility]  
  
  
  
                                                    Diabetes Risk Test Score was  
 one score   
2021                               Medical New Patient with Doreen   
Deborah CNP                              2021  
  
  
  
                                                    Last Documented On   
1 4:06PM ; [facility]  
  
  
  
                                Hypertension    Medical New Patient with Doreen C  
cate CNP 2021  
  
  
  
                                                    Last Documented On   
1 4:06PM ; [facility]  
  
  
  
                                Morbid obesity  Medical New Patient with Doreen C  
cate CNP 2021  
  
  
  
                                                    Last Documented On   
1 4:06PM ; [facility]  
  
  
  
                                Post-traumatic stress disorder Medical New Patie  
nt with Doreen Deborah CNP   
2021  
  
  
  
                                                    Last Documented On   
1 4:06PM ; [facility]  
  
  
  
                                                    Z68.42 - Body mass index [BM  
I]   
45.0-49.9, adult                        Medical New Patient with Doreen Cabrera CNP                              2021  
  
  
  
                                                    Last Documented On   
1 4:06PM ; [facility]  
Work Phone: [phone] of Present illness Narrative  
  
History of Present Illness not supported for this document type  
  
No History of Present Illness [facility]edHealth [facility]  
Work Phone: [phone] Discharge instructions* Instructions*   
  
Malika Shepherd MD - 2021  
  
  
  
Formatting of this note might be different from the original.  
Please take all medications as prescribed.  
  
Please follow up with your primary care physician by calling today, or as soon 
as possible, for thefirst available appointment. If you do not have a primary 
care physician, please contact a physician or clinic listed below today to 
establish care.  
  
Please return to the emergency department IMMEDIATELY if you develop 
uncontrolled fevers, uncontrolled vomiting, change in symptoms, worsening of 
symptoms, or ANY other concerns.  
  
  
  
  
* Attachments  
  
The following attachments cannot be sent through [facility] Everywhere.  
  
* Anxiety Disorder (English)  
  
[facility]  
Work Phone: [phone]  
  
Instructions not supported for this document type  
  
[facility]ed[facility]  
Work Phone: [phone]  
  
Includes: Instructions for all patient encounters  
  
  
  
                                                    Education and Decision Aids   
were provided during visit for:  
   
                                                    Counseling/education [Use fo  
r free text]  
   
                                                    Last Documented On   
4 7:40PM ; [facility]  
   
                                                    Discussed nutritional needs   
teach healthy choices including fruits and   
vegetables  
   
                                                    Last Documented On   
4 7:14PM ; [facility]  
   
                                                    Patient education about a pr  
oper diet  
   
                                                    Last Documented On   
4 7:14PM ; [facility]  
   
                                                    Discussed concerns about exe  
rcise : promote physical activity  
   
                                                    Last Documented On   
4 7:14PM ; [facility]  
   
                                                    Not requesting contraception  
   
                                                    Last Documented On   
4 7:14PM ; [facility]  
   
                                                    Discussed nutritional needs   
teach healthy choices including fruits and   
vegetables  
   
                                                    Last Documented On   
3 5:15PM ; [facility]  
   
                                                    Patient education about a pr  
oper diet  
   
                                                    Last Documented On   
3 5:15PM ; [facility]  
   
                                                    Discussed concerns about exe  
rcise : promote physical activity  
   
                                                    Last Documented On   
3 5:15PM ; [facility]  
   
                                                    Discussed nutritional needs   
teach healthy choices including fruits and   
vegetables  
   
                                                    Last Documented On 10/21/202  
2 1:42PM ; [facility]  
   
                                                    Patient education about a pr  
oper diet  
   
                                                    Last Documented On 10/21/202  
2 1:42PM ; [facility]  
   
                                                    Discussed concerns about exe  
rcise : promote physical activity  
   
                                                    Last Documented On 10/21/202  
2 1:42PM [facility]  
 and supportive feedback; normalized emotions and   
feelings, also provided pt time to process any current stressors. ~Promoted and   
encouraged follow-through with scheduling psychiatric services  
   
                                                    Last Documented On   
2 3:21PM ; [facility] provided supportive, empa  
thic listening and reflective feedback. ~Explored,   
encouraged, and supported the pt to discuss current sx/mood, assess risk for   
harm/need, coping mechanisms, support network and safety planning. ~Supported 
pt's   
plan to f/up with Dr. Alonso, as planned at [place], [place]. ~Encouraged 
pt to   
use safety plan, if needed to ensure she remains safe  
   
                                                    Last Documented On   
2 2:27PM ; [facility]  
   
                                                    Discussed nutritional needs   
teach healthy choices including fruits and   
vegetables  
   
                                                    Last Documented On   
2 3:20PM ; [facility]  
   
                                                    Patient education about a pr  
oper diet  
   
                                                    Last Documented On   
2 3:20PM ; [facility]  
   
                                                    Discussed concerns about exe  
rcise : promote physical activity ~ ~Will restart   
trazodone and prazosin ~ ~Patient is planning to have brother stay with her for 
a few   
days for emotional support ~ ~Follow up with PCP at next scheduled visit ~ ~Call
  
psychiatrist office to schedule appt ~ ~Call counselor  
   
                                                    Last Documented On   
2 9:35AM ; [facility]  
   
                                                    Provided supportive listenin  
g and empathic feedback; encouraged, explored, and   
supported the pt as she processed current symptoms, Issues, and concerns. 
~Discussed   
past tx and explored current needs/options. Acknowledged and validated pt's 
thoughts   
and emotions. ~Explored coping mechanisms and support network; utilized 
opportunity   
for safety planning; promoted seeking positive support and seeking help, as 
needed  
   
                                                    Last Documented On   
2 3:28PM ; [facility] provided active listenin  
g, support and helped pt process though current   
symptoms   
and stressor(s). Discussed and explored past effectiveness of medication; 
identified   
objectives and future goals; promoted use of healthy coping mechanisms, and 
self-care   
practices  
   
                                                    Last Documented On   
2 3:19PM ; [facility]  
   
                                                    Reviewed side effects and Ri  
sks/Benefits analysis  
   
                                                    Last Documented On   
2 3:19PM ; [facility]  
   
                                                    Discussed nutritional needs   
teach healthy choices including fruits and   
vegetables  
   
                                                    Last Documented On   
2 3:15PM ; [facility]  
   
                                                    Patient education about a pr  
oper diet  
   
                                                    Last Documented On   
2 3:15PM ; [facility]  
   
                                                    Discussed concerns about exe  
rcise : promote physical activity  
   
                                                    Last Documented On   
2 3:15PM ; [facility] introduced pt to [facility]WO in  
tegrated model of care ~[facility] offered active listening  
and   
supportive feedback; normalized emotions and feelings, also provided pt time to   
process any current stressors ~[facility] discussed potential benefits of counseling 
and   
supported re-engaging, as needed. ~[facility] encouraged pt to continue to make time to
  
implement self-care regimen and use coping methods, as needed  
   
                                                    Last Documented On   
2 4:07PM ; [facility]  
   
                                                    Discussed nutritional needs   
teach healthy choices including fruits and   
vegetables  
   
                                                    Last Documented On   
2 3:15PM ; [facility]  
   
                                                    Patient education about a pr  
oper diet  
   
                                                    Last Documented On   
2 3:15PM ; [facility]  
   
                                                    Inquiry and counseling about  
 medication administration and compliance  
   
                                                    Last Documented On   
2 7:37PM ; [facility]  
   
                                                    Discussed concerns about exe  
rcise : promote physical activity  
   
                                                    Last Documented On   
2 3:15PM ; [facility]  
   
                                                    Patient goals discussed  
   
                                                    Last Documented On   
2 7:37PM ; [facility]  
   
                                                    Ansewred pt's questions re B  
orderlline Personality D/O and Bipolar D/O raised by  
  
psychiatrist at [place]. ~Validated and normalized patient?s feelings while   
assisting to process recent events  
   
                                                    Last Documented On   
1 1:33AM ; [facility]  
   
                                                    Discussed nutritional needs   
teach healthy choices including fruits and   
vegetables  
   
                                                    Last Documented On   
1 2:04PM ; [facility]  
   
                                                    Patient education about a pr  
oper diet  
   
                                                    Last Documented On   
1 2:04PM ; [facility]  
   
                                                    Discussed concerns about exe  
rcise : promote physical activity  
   
                                                    Last Documented On   
1 2:04PM ; [facility] provided active listenin  
g, support and helped patient process through   
current   
symptoms and stressors with ongoing mental health concerns and medication 
changes.   
~[facility] discussed coping skills and supports that patient is implementing.  
discussed   
implementing coping skills as discussed with counseling and attending weekly   
appointments as scheduled with counselor. Patient was encouraged to continue 
writing   
down concerns with medications and discuss with providers. ~P reminded patient
of   
crisis resources should they be needed. Patient reports having crisis resources 
and   
could return to ER  
   
                                                    Last Documented On   
1 10:17AM ; [facility]  
   
                                                    Patient education about a pr  
oper diet  
   
                                                    Last Documented On   
1 5:17PM ; [facility]  
   
                                                    Patient education about meal  
 planning  
   
                                                    Last Documented On   
1 5:17PM ; [facility]  
   
                                                    Education about changing eat  
ing habits  
   
                                                    Last Documented On   
1 5:17PM ; [facility]  
   
                                                    Patient education about high  
 fiber diet  
   
                                                    Last Documented On   
1 5:17PM ; [facility]  
   
                                                    Patient education about low   
fat diet  
   
                                                    Last Documented On   
1 5:17PM ; [facility]  
   
                                                    Patient education about low   
cholesterol diet  
   
                                                    Last Documented On   
1 5:17PM ; [facility]  
   
                                                    Patient education about low   
carbohydrate diet  
   
                                                    Last Documented On   
1 5:17PM ; [facility]  
   
                                                    Patient education about high  
 protein diet  
   
                                                    Last Documented On   
1 5:17PM ; [facility] offered active and suppo  
rtive listening, normalized emotions and feelings,   
and   
processed current stressors. ~[facility] discussed resources for finding a counselor 
and   
provided list of local resources. ~P discussed patients coping skills and 
supports   
and encouraged patient to continue to implement. ~[facility] reminded patient of crisis
  
resources should they be needed  
   
                                                    Last Documented On   
1 7:15PM ; [facility]  
   
                                                    Discussed nutritional needs   
teach healthy choices including fruits and   
vegetables  
   
                                                    Last Documented On   
1 11:38AM ; [facility]  
   
                                                    Patient education about a pr  
oper diet  
   
                                                    Last Documented On   
1 11:38AM ; [facility]  
   
                                                    Patient education about a pr  
oper diet  
   
                                                    Last Documented On   
1 12:19PM ; [facility]  
   
                                                    Patient education about meal  
 planning  
   
                                                    Last Documented On   
1 12:19PM ; [facility]  
   
                                                    Education about changing eat  
ing habits  
   
                                                    Last Documented On   
1 12:19PM ; [facility]  
   
                                                    Patient education about high  
 fiber diet  
   
                                                    Last Documented On  12:19PM ; [facility]  
   
                                                    Patient education about low   
fat diet  
   
                                                    Last Documented On   
1 12:19PM ; [facility]  
   
                                                    Patient education about low   
cholesterol diet  
   
                                                    Last Documented On   
1 12:19PM ; [facility]  
   
                                                    Patient education about low   
carbohydrate diet  
   
                                                    Last Documented On   
1 12:19PM ; [facility]  
   
                                                    Patient education about high  
 protein diet  
   
                                                    Last Documented On   
1 12:19PM ; [facility]  
   
                                                    Discussed concerns about exe  
rcise : promote physical activity  
   
                                                    Last Documented On   
1 11:38AM ; [facility] provided active listenin  
g, support and helped patient process through   
current   
symptoms and stressors related to family conflict. ~[facility] discussed coping skills 
and   
supports with patient that can be implemented and reminded patient of ways to 
access   
additional resources. ~[facility] discussed crisis resources and plan. Patient has 
crisis   
resources still available should they be needed  
   
                                                    Last Documented On 06/10/202  
1 7:04PM ; [facility]  
   
                                                    Discussed nutritional needs   
teach healthy choices including fruits and   
vegetables  
   
                                                    Last Documented On 06/10/202  
1 3:57PM ; [facility]  
   
                                                    Patient education about a pr  
oper diet  
   
                                                    Last Documented On 06/10/202  
1 3:57PM ; [facility]  
   
                                                    Discussed concerns about exe  
rcise : promote physical activity  
   
                                                    Last Documented On 06/10/202  
1 3:57PM [facility] and PCP reassured   
patient of not sharing information with anyone unless she has signed a release 
for us   
to do so. ~[facility] provided active listening, support and helped patient process 
through   
current symptoms and stressors. ~[facility] discussed establishing counseling and   
psychiatry. [facility] discussed EMDR therapy and ways to find provider who does this 
type   
of therapy. ~[facility] discussed crisis resources should mood worsen, [facility] provided 
text   
hotline number for crisis. [facility] reviewed crisis plan with patient and patient is 
able   
to contact positive supports and family when feeling down  
   
                                                    Last Documented On   
1 11:46AM ; [facility]  
   
                                                    Discussed nutritional needs   
teach healthy choices including fruits and   
vegetables  
   
                                                    Last Documented On   
1 2:11PM ; [facility]  
   
                                                    Patient education about a pr  
oper diet  
   
                                                    Last Documented On   
1 2:11PM ; [facility]  
   
                                                    Discussed concerns about exe  
rcise : promote physical activity  
   
                                                    Last Documented On   
1 2:11PM ; [facility]  
Work Phone: [phone]  
  
Includes: Instructions for all patient encounters  
  
  
  
                                                    Education and Decision Aids   
were provided during visit for:  
   
                                                    [facility] offered active and suppo  
rtive listening and processed current stressors   
related   
to getting medications. ~[facility] discussed coping skills and supports to implement 
in   
daily routine. ~[facility] discussed progress patient has felt they have made recently 
and   
encouraged continued follow-up with providers to address health  
   
                                                    Last Documented On   
4 5:16PM ; [facility]  
   
                                                    Discussed nutritional needs   
teach healthy choices including fruits and   
vegetables  
   
                                                    Last Documented On   
4 7:14PM ; [facility]  
   
                                                    Patient education about a pr  
oper diet  
   
                                                    Last Documented On   
4 7:14PM ; [facility]  
   
                                                    Discussed concerns about exe  
rcise : promote physical activity  
   
                                                    Last Documented On   
4 7:14PM ; [facility]  
   
                                                    Not requesting contraception  
   
                                                    Last Documented On   
4 7:14PM ; [facility]  
   
                                                    Discussed nutritional needs   
teach healthy choices including fruits and   
vegetables  
   
                                                    Last Documented On   
3 5:15PM ; [facility]  
   
                                                    Patient education about a pr  
oper diet  
   
                                                    Last Documented On   
3 5:15PM ; [facility]  
   
                                                    Discussed concerns about exe  
rcise : promote physical activity  
   
                                                    Last Documented On   
3 5:15PM ; [facility]  
   
                                                    Discussed nutritional needs   
teach healthy choices including fruits and   
vegetables  
   
                                                    Last Documented On 10/21/202  
2 1:42PM ; [facility]  
   
                                                    Patient education about a pr  
oper diet  
   
                                                    Last Documented On 10/21/202  
2 1:42PM ; [facility]  
   
                                                    Discussed concerns about exe  
rcise : promote physical activity  
   
                                                    Last Documented On 10/21/202  
2 1:42PM ; [facility] offered active listening  
 and supportive feedback; normalized emotions and   
feelings, also provided pt time to process any current stressors. ~Promoted and   
encouraged follow-through with scheduling psychiatric services  
   
                                                    Last Documented On   
2 3:21PM ; [facility] provided supportive, empa  
thic listening and reflective feedback. ~Explored,   
encouraged, and supported the pt to discuss current sx/mood, assess risk for   
harm/need, coping mechanisms, support network and safety planning. ~Supported 
pt's   
plan to f/up with Dr. Alonso, as planned at [place], [place]. ~Encouraged 
pt to   
use safety plan, if needed to ensure she remains safe  
   
                                                    Last Documented On   
2 2:27PM ; [facility]  
   
                                                    Discussed nutritional needs   
teach healthy choices including fruits and   
vegetables  
   
                                                    Last Documented On   
2 3:20PM ; [facility]  
   
                                                    Patient education about a pr  
oper diet  
   
                                                    Last Documented On   
2 3:20PM ; [facility]  
   
                                                    Discussed concerns about exe  
rcise : promote physical activity ~ ~Will restart   
trazodone and prazosin ~ ~Patient is planning to have brother stay with her for 
a few   
days for emotional support ~ ~Follow up with PCP at next scheduled visit ~ ~Call
  
psychiatrist office to schedule appt ~ ~Call counselor  
   
                                                    Last Documented On   
2 9:35AM ; [facility]  
   
                                                    Provided supportive listenin  
g and empathic feedback; encouraged, explored, and   
supported the pt as she processed current symptoms, Issues, and concerns. 
~Discussed   
past tx and explored current needs/options. Acknowledged and validated pt's 
thoughts   
and emotions. ~Explored coping mechanisms and support network; utilized 
opportunity   
for safety planning; promoted seeking positive support and seeking help, as 
needed  
   
                                                    Last Documented On   
2 3:28PM ; [facility] provided active listenin  
g, support and helped pt process though current   
symptoms   
and stressor(s). Discussed and explored past effectiveness of medication; 
identified   
objectives and future goals; promoted use of healthy coping mechanisms, and 
self-care   
practices  
   
                                                    Last Documented On   
2 3:19PM ; [facility]  
   
                                                    Reviewed side effects and Ri  
sks/Benefits analysis  
   
                                                    Last Documented On   
2 3:19PM ; [facility]  
   
                                                    Discussed nutritional needs   
teach healthy choices including fruits and   
vegetables  
   
                                                    Last Documented On   
2 3:15PM ; [facility]  
   
                                                    Patient education about a pr  
oper diet  
   
                                                    Last Documented On   
2 3:15PM ; [facility]  
   
                                                    Discussed concerns about exe  
rcise : promote physical activity  
   
                                                    Last Documented On   
2 3:15PM ; [facility] introduced pt to [facility] in  
tegrated model of care ~[facility] offered active listening  
and   
supportive feedback; normalized emotions and feelings, also provided pt time to   
process any current stressors ~[facility] discussed potential benefits of counseling 
and   
supported re-engaging, as needed. ~[facility] encouraged pt to continue to make time to
  
implement self-care regimen and use coping methods, as needed  
   
                                                    Last Documented On   
2 4:07PM ; [facility]  
   
                                                    Discussed nutritional needs   
teach healthy choices including fruits and   
vegetables  
   
                                                    Last Documented On   
2 3:15PM ; [facility]  
   
                                                    Patient education about a pr  
oper diet  
   
                                                    Last Documented On   
2 3:15PM ; [facility]  
   
                                                    Inquiry and counseling about  
 medication administration and compliance  
   
                                                    Last Documented On   
2 7:37PM ; [facility]  
   
                                                    Discussed concerns about exe  
rcise : promote physical activity  
   
                                                    Last Documented On   
2 3:15PM ; [facility]  
   
                                                    Patient goals discussed  
   
                                                    Last Documented On   
2 7:37PM ; [facility]  
   
                                                    Ansewred pt's questions re B  
orderlline Personality D/O and Bipolar D/O raised by  
  
psychiatrist at [facility]. ~Validated and normalized patient?s feelings while   
assisting to process recent events  
   
                                                    Last Documented On   
1 1:33AM ; [facility]  
   
                                                    Discussed nutritional needs   
teach healthy choices including fruits and   
vegetables  
   
                                                    Last Documented On   
1 2:04PM ; [facility]  
   
                                                    Patient education about a pr  
oper diet  
   
                                                    Last Documented On   
1 2:04PM ; [facility]  
   
                                                    Discussed concerns about exe  
rcise : promote physical activity  
   
                                                    Last Documented On   
1 2:04PM ; [facility] provided active listenin  
g, support and helped patient process through   
current   
symptoms and stressors with ongoing mental health concerns and medication 
changes.   
[facility] discussed coping skills and supports that patient is implementing.  
discussed   
implementing coping skills as discussed with counseling and attending weekly   
appointments as scheduled with counselor. Patient was encouraged to continue 
writing   
down concerns with medications and discuss with providers. [facility] reminded patient
of   
crisis resources should they be needed. Patient reports having crisis resources 
and   
could return to ER  
   
                                                    Last Documented On   
1 10:17AM ; [facility]  
   
                                                    Patient education about a pr  
oper diet  
   
                                                    Last Documented On   
1 5:17PM ; [facility]  
   
                                                    Patient education about meal  
 planning  
   
                                                    Last Documented On   
1 5:17PM ; [facility]  
   
                                                    Education about changing eat  
ing habits  
   
                                                    Last Documented On   
1 5:17PM ; [facility]  
   
                                                    Patient education about high  
 fiber diet  
   
                                                    Last Documented On   
1 5:17PM ; [facility]  
   
                                                    Patient education about low   
fat diet  
   
                                                    Last Documented On   
1 5:17PM ; [facility]  
   
                                                    Patient education about low   
cholesterol diet  
   
                                                    Last Documented On   
1 5:17PM ; [facility]  
   
                                                    Patient education about low   
carbohydrate diet  
   
                                                    Last Documented On   
1 5:17PM ; [facility]  
   
                                                    Patient education about high  
 protein diet  
   
                                                    Last Documented On   
1 5:17PM ; [facility] offered active and suppo  
rtive listening, normalized emotions and feelings,   
and   
processed current stressors. [facility] discussed resources for finding a counselor 
and   
provided list of local resources. [facility] discussed patients coping skills and 
supports   
and encouraged patient to continue to implement. [facility] reminded patient of crisis
  
resources should they be needed  
   
                                                    Last Documented On   
1 7:15PM ; [facility]  
   
                                                    Discussed nutritional needs   
teach healthy choices including fruits and   
vegetables  
   
                                                    Last Documented On   
1 11:38AM ; [facility]  
   
                                                    Patient education about a pr  
oper diet  
   
                                                    Last Documented On   
1 11:38AM ; [facility]  
   
                                                    Patient education about a pr  
oper diet  
   
                                                    Last Documented On   
1 12:19PM ; [facility]  
   
                                                    Patient education about meal  
 planning  
   
                                                    Last Documented On   
1 12:19PM ; [facility]  
   
                                                    Education about changing eat  
ing habits  
   
                                                    Last Documented On   
1 12:19PM ; [facility]  
   
                                                    Patient education about high  
 fiber diet  
   
                                                    Last Documented On   
1 12:19PM ; [facility]  
   
                                                    Patient education about low   
fat diet  
   
                                                    Last Documented On   
1 12:19PM ; [facility]  
   
                                                    Patient education about low   
cholesterol diet  
   
                                                    Last Documented On   
1 12:19PM ; [facility]  
   
                                                    Patient education about low   
carbohydrate diet  
   
                                                    Last Documented On   
1 12:19PM ; [facility]  
   
                                                    Patient education about high  
 protein diet  
   
                                                    Last Documented On   
1 12:19PM ; [facility]  
   
                                                    Discussed concerns about exe  
rcise : promote physical activity  
   
                                                    Last Documented On   
1 11:38AM ; [facility]  
[facility], support and helped patient process through   
current   
symptoms and stressors related to family conflict. ~P discussed coping skills 
and   
supports with patient that can be implemented and reminded patient of ways to 
access   
additional resources. ~P discussed crisis resources and plan. Patient has 
crisis   
resources still available should they be needed  
   
                                                    Last Documented On 06/10/202  
1 7:04PM ; [facility]  
   
                                                    Discussed nutritional needs   
teach healthy choices including fruits and   
vegetables  
   
                                                    Last Documented On 06/10/202  
1 3:57PM ; [facility]  
   
                                                    Patient education about a pr  
oper diet  
   
                                                    Last Documented On 06/10/202  
1 3:57PM ; [facility]  
   
                                                    Discussed concerns about exe  
rcise : promote physical activity  
   
                                                    Last Documented On 06/10/202  
1 3:57PM ; [facility]. [facility] and PCP reassured   
patient of not sharing information with anyone unless she has signed a release 
for us   
to do so. ~P provided active listening, support and helped patient process 
through   
current symptoms and stressors. ~P discussed establishing counseling and   
psychiatry. [facility] discussed EMDR therapy and ways to find provider who does this 
type   
of therapy. ~P discussed crisis resources should mood worsen, [facility] provided 
text   
hotline number for crisis. [facility] reviewed crisis plan with patient and patient is 
able   
to contact positive supports and family when feeling down  
   
                                                    Last Documented On   
1 11:46AM ; [facility]  
   
                                                    Discussed nutritional needs   
teach healthy choices including fruits and   
vegetables  
   
                                                    Last Documented On   
1 2:11PM ; [facility]  
   
                                                    Patient education about a pr  
oper diet  
   
                                                    Last Documented On   
1 2:11PM ; [facility]  
   
                                                    Discussed concerns about exe  
rcise : promote physical activity  
   
                                                    Last Documented On   
1 2:11PM ; [facility]  
Work Phone: [phone]  
  
Includes: Instructions for all patient encounters  
  
  
  
                                                    Education and Decision Aids   
were provided during visit for:  
   
                                                    [facility] offered active and suppo  
rtive listening and processed current stressors   
related   
to getting medications. ~[facility] discussed coping skills and supports to implement 
in   
daily routine. ~[facility] discussed progress patient has felt they have made recently 
and   
encouraged continued follow-up with providers to address health  
   
                                                    Last Documented On   
4 5:16PM ; [facility]  
   
                                                    Discussed nutritional needs   
teach healthy choices including fruits and   
vegetables  
   
                                                    Last Documented On   
4 7:14PM ; [facility]  
   
                                                    Patient education about a pr  
oper diet  
   
                                                    Last Documented On   
4 7:14PM ; [facility]  
   
                                                    Discussed concerns about exe  
rcise : promote physical activity  
   
                                                    Last Documented On   
4 7:14PM ; [facility]  
   
                                                    Not requesting contraception  
   
                                                    Last Documented On   
4 7:14PM ; [facility]  
   
                                                    Discussed nutritional needs   
teach healthy choices including fruits and   
vegetables  
   
                                                    Last Documented On   
3 5:15PM ; [facility]  
   
                                                    Patient education about a pr  
oper diet  
   
                                                    Last Documented On   
3 5:15PM ; [facility]  
   
                                                    Discussed concerns about exe  
rcise : promote physical activity  
   
                                                    Last Documented On   
3 5:15PM ; [facility]  
   
                                                    Discussed nutritional needs   
teach healthy choices including fruits and   
vegetables  
   
                                                    Last Documented On 10/21/202  
2 1:42PM ; [facility]  
   
                                                    Patient education about a pr  
oper diet  
   
                                                    Last Documented On 10/21/202  
2 1:42PM ; [facility]  
   
                                                    Discussed concerns about exe  
rcise : promote physical activity  
   
                                                    Last Documented On 10/21/202  
2 1:42PM ; [facility] offered active listening  
 and supportive feedback; normalized emotions and   
feelings, also provided pt time to process any current stressors. ~Promoted and   
encouraged follow-through with scheduling psychiatric services  
   
                                                    Last Documented On   
2 3:21PM ; [facility] provided supportive, empa  
thic listening and reflective feedback. ~Explored,   
encouraged, and supported the pt to discuss current sx/mood, assess risk for   
harm/need, coping mechanisms, support network and safety planning. ~Supported 
pt's   
plan to f/up with Dr. Alonso, as planned at [place], [place]. ~Encouraged 
pt to   
use safety plan, if needed to ensure she remains safe  
   
                                                    Last Documented On   
2 2:27PM ; [facility]  
   
                                                    Discussed nutritional needs   
teach healthy choices including fruits and   
vegetables  
   
                                                    Last Documented On   
2 3:20PM ; [facility]  
   
                                                    Patient education about a pr  
oper diet  
   
                                                    Last Documented On   
2 3:20PM ; [facility]  
   
                                                    Discussed concerns about exe  
rcise : promote physical activity ~ ~Will restart   
trazodone and prazosin ~ ~Patient is planning to have brother stay with her for 
a few   
days for emotional support ~ ~Follow up with PCP at next scheduled visit ~ ~Call
  
psychiatrist office to schedule appt ~ ~Call counselor  
   
                                                    Last Documented On   
2 9:35AM ; [facility]  
   
                                                    Provided supportive listenin  
g and empathic feedback; encouraged, explored, and   
supported the pt as she processed current symptoms, Issues, and concerns. 
~Discussed   
past tx and explored current needs/options. Acknowledged and validated pt's 
thoughts   
and emotions. ~Explored coping mechanisms and support network; utilized 
opportunity   
for safety planning; promoted seeking positive support and seeking help, as 
needed  
   
                                                    Last Documented On   
2 3:28PM ; [facility] provided active listenin  
g, support and helped pt process though current   
symptoms   
and stressor(s). Discussed and explored past effectiveness of medication; 
identified   
objectives and future goals; promoted use of healthy coping mechanisms, and 
self-care   
practices  
   
                                                    Last Documented On   
2 3:19PM ; [facility]  
   
                                                    Reviewed side effects and Ri  
sks/Benefits analysis  
   
                                                    Last Documented On   
2 3:19PM ; [facility]  
   
                                                    Discussed nutritional needs   
teach healthy choices including fruits and   
vegetables  
   
                                                    Last Documented On   
2 3:15PM ; [facility]  
   
                                                    Patient education about a pr  
oper diet  
   
                                                    Last Documented On   
2 3:15PM ; [facility]  
   
                                                    Discussed concerns about exe  
rcise : promote physical activity  
   
                                                    Last Documented On   
2 3:15PM ; [facility] introduced pt to [facility] in  
tegrated model of care ~[facility] offered active listening  
and   
supportive feedback; normalized emotions and feelings, also provided pt time to   
process any current stressors ~[facility] discussed potential benefits of counseling 
and   
supported re-engaging, as needed. ~[facility] encouraged pt to continue to make time to
  
implement self-care regimen and use coping methods, as needed  
   
                                                    Last Documented On   
2 4:07PM ; [facility]  
   
                                                    Discussed nutritional needs   
teach healthy choices including fruits and   
vegetables  
   
                                                    Last Documented On   
2 3:15PM ; [facility]  
   
                                                    Patient education about a pr  
oper diet  
   
                                                    Last Documented On   
2 3:15PM ; [facility]  
   
                                                    Inquiry and counseling about  
 medication administration and compliance  
   
                                                    Last Documented On   
2 7:37PM ; [facility]  
   
                                                    Discussed concerns about exe  
rcise : promote physical activity  
   
                                                    Last Documented On   
2 3:15PM ; [facility]  
   
                                                    Patient goals discussed  
   
                                                    Last Documented On   
2 7:37PM ; [facility]  
   
                                                    Ansewred pt's questions re B  
orderlline Personality D/O and Bipolar D/O raised by  
  
psychiatrist at [place]. ~Validated and normalized patient?s feelings while   
assisting to process recent events  
   
                                                    Last Documented On   
1 1:33AM ; [facility]  
   
                                                    Discussed nutritional needs   
teach healthy choices including fruits and   
vegetables  
   
                                                    Last Documented On   
1 2:04PM ; [facility]  
   
                                                    Patient education about a pr  
oper diet  
   
                                                    Last Documented On   
1 2:04PM ; [facility]  
   
                                                    Discussed concerns about exe  
rcise : promote physical activity  
   
                                                    Last Documented On   
1 2:04PM ; [facility] provided active listenin  
g, support and helped patient process through   
current   
symptoms and stressors with ongoing mental health concerns and medication 
changes.   
[facility] discussed coping skills and supports that patient is implementing.  
discussed   
implementing coping skills as discussed with counseling and attending weekly   
appointments as scheduled with counselor. Patient was encouraged to continue 
writing   
down concerns with medications and discuss with providers. [facility] reminded patient
of   
crisis resources should they be needed. Patient reports having crisis resources 
and   
could return to ER  
   
                                                    Last Documented On   
1 10:17AM ; [facility]  
   
                                                    Patient education about a pr  
oper diet  
   
                                                    Last Documented On  5:17PM ; [facility]  
   
                                                    Patient education about meal  
 planning  
   
                                                    Last Documented On  5:17PM ; [facility]  
   
                                                    Education about changing eat  
ing habits  
   
                                                    Last Documented On  5:17PM ; [facility]  
   
                                                    Patient education about high  
 fiber diet  
   
                                                    Last Documented On  5:17PM ; [facility]  
   
                                                    Patient education about low   
fat diet  
   
                                                    Last Documented On  5:17PM ; [facility]  
   
                                                    Patient education about low   
cholesterol diet  
   
                                                    Last Documented On  5:17PM ; [facility]  
   
                                                    Patient education about low   
carbohydrate diet  
   
                                                    Last Documented On  5:17PM ; [facility]  
   
                                                    Patient education about high  
 protein diet  
   
                                                    Last Documented On  5:17PM ; [facility] offered active and suppo  
rtive listening, normalized emotions and feelings,   
and   
processed current stressors. [facility] discussed resources for finding a counselor 
and   
provided list of local resources. [facility] discussed patients coping skills and 
supports   
and encouraged patient to continue to implement. [facility] reminded patient of crisis
  
resources should they be needed  
   
                                                    Last Documented On   
1 7:15PM ; [facility]  
   
                                                    Discussed nutritional needs   
teach healthy choices including fruits and   
vegetables  
   
                                                    Last Documented On   
1 11:38AM ; [facility]  
   
                                                    Patient education about a pr  
oper diet  
   
                                                    Last Documented On   
1 11:38AM ; [facility]  
   
                                                    Patient education about a pr  
oper diet  
   
                                                    Last Documented On  12:19PM ; [facility]  
   
                                                    Patient education about meal  
 planning  
   
                                                    Last Documented On  12:19PM ; [facility]  
   
                                                    Education about changing eat  
ing habits  
   
                                                    Last Documented On   
1 12:19PM ; [facility]  
   
                                                    Patient education about high  
 fiber diet  
   
                                                    Last Documented On   
1 12:19PM ; [facility]  
   
                                                    Patient education about low   
fat diet  
   
                                                    Last Documented On   
1 12:19PM ; [facility]  
   
                                                    Patient education about low   
cholesterol diet  
   
                                                    Last Documented On   
1 12:19PM ; [facility]  
   
                                                    Patient education about low   
carbohydrate diet  
   
                                                    Last Documented On   
1 12:19PM ; [facility]  
   
                                                    Patient education about high  
 protein diet  
   
                                                    Last Documented On   
1 12:19PM ; [facility]  
   
                                                    Discussed concerns about exe  
rcise : promote physical activity  
   
                                                    Last Documented On   
1 11:38AM ; [facility] provided active listenin  
g, support and helped patient process through   
current   
symptoms and stressors related to family conflict. ~P discussed coping skills 
and   
supports with patient that can be implemented and reminded patient of ways to 
access   
additional resources. ~[facility] discussed crisis resources and plan. Patient has 
crisis   
resources still available should they be needed  
   
                                                    Last Documented On 06/10/202  
1 7:04PM ; [facility]  
   
                                                    Discussed nutritional needs   
teach healthy choices including fruits and   
vegetables  
   
                                                    Last Documented On 06/10/202  
1 3:57PM ; [facility]  
   
                                                    Patient education about a pr  
oper diet  
   
                                                    Last Documented On 06/10/202  
1 3:57PM ; [facility]  
   
                                                    Discussed concerns about exe  
rcise : promote physical activity  
   
                                                    Last Documented On 06/10/202  
1 3:57PM ; [facility]. [facility] and PCP reassured   
patient of not sharing information with anyone unless she has signed a release 
for us   
to do so. ~P provided active listening, support and helped patient process 
through   
current symptoms and stressors. ~P discussed establishing counseling and   
psychiatry. P discussed EMDR therapy and ways to find provider who does this 
type   
of therapy. ~[facility] discussed crisis resources should mood worsen, P provided 
text   
hotline number for crisis. P reviewed crisis plan with patient and patient is 
able   
to contact positive supports and family when feeling down  
   
                                                    Last Documented On   
1 11:46AM ; [facility]  
   
                                                    Discussed nutritional needs   
teach healthy choices including fruits and   
vegetables  
   
                                                    Last Documented On   
1 2:11PM ; [facility]  
   
                                                    Patient education about a pr  
oper diet  
   
                                                    Last Documented On   
1 2:11PM ; [facility]  
   
                                                    Discussed concerns about exe  
rcise : promote physical activity  
   
                                                    Last Documented On   
1 2:11PM ; [facility]  
Work Phone: [phone]  
  
Includes: Instructions for all patient encounters  
  
  
  
                                                    Education and Decision Aids   
were provided during visit for:  
   
                                                    [facility] offered active and suppo  
rtive listening and processed current stressors.   
~[facility]   
discussed coping skills and supports that can be implemented to manage increased
  
anxiety and stressors. [facility] discussed potential of resuming counseling, even if 
for   
monthly visits to process ongoing stressors. ~[facility] encouraged patient to follow-
up on   
referrals as discussed with PCP  
   
                                                    Last Documented On   
4 10:08AM ; [facility]  
   
                                                    Discussed nutritional needs   
teach healthy choices including fruits and   
vegetables  
   
                                                    Last Documented On   
4 4:46PM ; [facility]  
   
                                                    Patient education about a pr  
oper diet  
   
                                                    Last Documented On   
4 4:46PM ; [facility]  
   
                                                    Discussed concerns about exe  
rcise : promote physical activity  
   
                                                    Last Documented On   
4 4:46PM ; [facility]  
   
                                                    Not requesting contraception  
   
                                                    Last Documented On   
4 4:46PM ; [facility] offered active and suppo  
rtive listening and processed current stressors   
related   
to getting medications. ~[facility] discussed coping skills and supports to implement 
in   
daily routine. ~[facility] discussed progress patient has felt they have made recently 
and   
encouraged continued follow-up with providers to address health  
   
                                                    Last Documented On   
4 5:16PM ; [facility]  
   
                                                    Discussed nutritional needs   
teach healthy choices including fruits and   
vegetables  
   
                                                    Last Documented On   
4 7:14PM ; [facility]  
   
                                                    Patient education about a pr  
oper diet  
   
                                                    Last Documented On   
4 7:14PM ; [facility]  
   
                                                    Discussed concerns about exe  
rcise : promote physical activity  
   
                                                    Last Documented On   
4 7:14PM ; [facility]  
   
                                                    Not requesting contraception  
   
                                                    Last Documented On   
4 7:14PM ; [facility]  
   
                                                    Discussed nutritional needs   
teach healthy choices including fruits and   
vegetables  
   
                                                    Last Documented On   
3 5:15PM ; [facility]  
   
                                                    Patient education about a pr  
oper diet  
   
                                                    Last Documented On   
3 5:15PM ; [facility]  
   
                                                    Discussed concerns about exe  
rcise : promote physical activity  
   
                                                    Last Documented On   
3 5:15PM ; [facility]  
   
                                                    Discussed nutritional needs   
teach healthy choices including fruits and   
vegetables  
   
                                                    Last Documented On 10/21/202  
2 1:42PM ; [facility]  
   
                                                    Patient education about a pr  
oper diet  
   
                                                    Last Documented On 10/21/202  
2 1:42PM ; [facility]  
   
                                                    Discussed concerns about exe  
rcise : promote physical activity  
   
                                                    Last Documented On 10/21/202  
2 1:42PM ; [facility]  
 and supportive feedback; normalized emotions and   
feelings, also provided pt time to process any current stressors. ~Promoted and   
encouraged follow-through with scheduling psychiatric services  
   
                                                    Last Documented On   
2 3:21PM ; [facility] provided supportive, empa  
thic listening and reflective feedback. ~Explored,   
encouraged, and supported the pt to discuss current sx/mood, assess risk for   
harm/need, coping mechanisms, support network and safety planning. ~Supported 
pt's   
plan to f/up with Dr. Alonso, as planned at [place], [place]. ~Encouraged 
pt to   
use safety plan, if needed to ensure she remains safe  
   
                                                    Last Documented On   
2 2:27PM ; [facility]  
   
                                                    Discussed nutritional needs   
teach healthy choices including fruits and   
vegetables  
   
                                                    Last Documented On   
2 3:20PM ; [facility]  
   
                                                    Patient education about a pr  
oper diet  
   
                                                    Last Documented On   
2 3:20PM ; [facility]  
   
                                                    Discussed concerns about exe  
rcise : promote physical activity ~ ~Will restart   
trazodone and prazosin ~ ~Patient is planning to have brother stay with her for 
a few   
days for emotional support ~ ~Follow up with PCP at next scheduled visit ~ ~Call
  
psychiatrist office to schedule appt ~ ~Call counselor  
   
                                                    Last Documented On   
2 9:35AM ; [facility]  
   
                                                    Provided supportive listenin  
g and empathic feedback; encouraged, explored, and   
supported the pt as she processed current symptoms, Issues, and concerns. 
~Discussed   
past tx and explored current needs/options. Acknowledged and validated pt's 
thoughts   
and emotions. ~Explored coping mechanisms and support network; utilized 
opportunity   
for safety planning; promoted seeking positive support and seeking help, as 
needed  
   
                                                    Last Documented On   
2 3:28PM ; [facility] provided active listenin  
g, support and helped pt process though current   
symptoms   
and stressor(s). Discussed and explored past effectiveness of medication; 
identified   
objectives and future goals; promoted use of healthy coping mechanisms, and 
self-care   
practices  
   
                                                    Last Documented On   
2 3:19PM ; [facility]  
   
                                                    Reviewed side effects and Ri  
sks/Benefits analysis  
   
                                                    Last Documented On   
2 3:19PM ; [facility]  
   
                                                    Discussed nutritional needs   
teach healthy choices including fruits and   
vegetables  
   
                                                    Last Documented On   
2 3:15PM ; [facility]  
   
                                                    Patient education about a pr  
oper diet  
   
                                                    Last Documented On   
2 3:15PM ; [facility]  
   
                                                    Discussed concerns about exe  
rcise : promote physical activity  
   
                                                    Last Documented On   
2 3:15PM ; [facility] introduced pt to [facility] in  
tegrated model of care ~[facility] offered active listening  
and   
supportive feedback; normalized emotions and feelings, also provided pt time to   
process any current stressors ~[facility] discussed potential benefits of counseling 
and   
supported re-engaging, as needed. ~[facility] encouraged pt to continue to make time to
  
implement self-care regimen and use coping methods, as needed  
   
                                                    Last Documented On   
2 4:07PM ; [facility]  
   
                                                    Discussed nutritional needs   
teach healthy choices including fruits and   
vegetables  
   
                                                    Last Documented On   
2 3:15PM ; [facility]  
   
                                                    Patient education about a pr  
oper diet  
   
                                                    Last Documented On   
2 3:15PM ; [facility]  
   
                                                    Inquiry and counseling about  
 medication administration and compliance  
   
                                                    Last Documented On   
2 7:37PM ; [facility]  
   
                                                    Discussed concerns about exe  
rcise : promote physical activity  
   
                                                    Last Documented On   
2 3:15PM ; [facility]  
   
                                                    Patient goals discussed  
   
                                                    Last Documented On   
2 7:37PM ; [facility]  
   
                                                    Ansewred pt's questions re B  
orderlline Personality D/O and Bipolar D/O raised by  
  
psychiatrist at [place]. ~Validated and normalized patient?s feelings while   
assisting to process recent events  
   
                                                    Last Documented On   
1 1:33AM ; [facility]  
   
                                                    Discussed nutritional needs   
teach healthy choices including fruits and   
vegetables  
   
                                                    Last Documented On   
1 2:04PM ; [facility]  
   
                                                    Patient education about a pr  
oper diet  
   
                                                    Last Documented On   
1 2:04PM ; [facility]  
   
                                                    Discussed concerns about exe  
rcise : promote physical activity  
   
                                                    Last Documented On   
1 2:04PM ; [facility] provided active listenin  
g, support and helped patient process through   
current   
symptoms and stressors with ongoing mental health concerns and medication 
changes.   
~[facility] discussed coping skills and supports that patient is implementing.  
discussed   
implementing coping skills as discussed with counseling and attending weekly   
appointments as scheduled with counselor. Patient was encouraged to continue 
writing   
down concerns with medications and discuss with providers. ~P reminded patient
of   
crisis resources should they be needed. Patient reports having crisis resources 
and   
could return to ER  
   
                                                    Last Documented On   
1 10:17AM ; [facility]  
   
                                                    Patient education about a pr  
oper diet  
   
                                                    Last Documented On   
1 5:17PM ; [facility]  
   
                                                    Patient education about meal  
 planning  
   
                                                    Last Documented On   
1 5:17PM ; [facility]  
   
                                                    Education about changing eat  
ing habits  
   
                                                    Last Documented On   
1 5:17PM ; [facility]  
   
                                                    Patient education about high  
 fiber diet  
   
                                                    Last Documented On   
1 5:17PM ; [facility]  
   
                                                    Patient education about low   
fat diet  
   
                                                    Last Documented On   
1 5:17PM ; [facility]  
   
                                                    Patient education about low   
cholesterol diet  
   
                                                    Last Documented On   
1 5:17PM ; [facility]  
   
                                                    Patient education about low   
carbohydrate diet  
   
                                                    Last Documented On   
1 5:17PM ; [facility]  
   
                                                    Patient education about high  
 protein diet  
   
                                                    Last Documented On   
1 5:17PM ; [facility] offered active and suppo  
rtive listening, normalized emotions and feelings,   
and   
processed current stressors. ~[facility] discussed resources for finding a counselor 
and   
provided list of local resources. ~P discussed patients coping skills and 
supports   
and encouraged patient to continue to implement. ~[facility] reminded patient of crisis
  
resources should they be needed  
   
                                                    Last Documented On   
1 7:15PM ; [facility]  
   
                                                    Discussed nutritional needs   
teach healthy choices including fruits and   
vegetables  
   
                                                    Last Documented On   
1 11:38AM ; [facility]  
   
                                                    Patient education about a pr  
oper diet  
   
                                                    Last Documented On   
1 11:38AM ; [facility]  
   
                                                    Patient education about a pr  
oper diet  
   
                                                    Last Documented On   
1 12:19PM ; [facility]  
   
                                                    Patient education about meal  
 planning  
   
                                                    Last Documented On   
1 12:19PM ; [facility]  
   
                                                    Education about changing eat  
ing habits  
   
                                                    Last Documented On   
1 12:19PM ; [facility]  
   
                                                    Patient education about high  
 fiber diet  
   
                                                    Last Documented On   
1 12:19PM ; [facility]  
   
                                                    Patient education about low   
fat diet  
   
                                                    Last Documented On   
1 12:19PM ; [facility]  
   
                                                    Patient education about low   
cholesterol diet  
   
                                                    Last Documented On   
1 12:19PM ; [facility]  
   
                                                    Patient education about low   
carbohydrate diet  
   
                                                    Last Documented On   
1 12:19PM ; [facility]  
   
                                                    Patient education about high  
 protein diet  
   
                                                    Last Documented On   
1 12:19PM ; [facility]  
   
                                                    Discussed concerns about exe  
rcise : promote physical activity  
   
                                                    Last Documented On   
1 11:38AM ; [facility] provided active listenin  
g, support and helped patient process through   
current   
symptoms and stressors related to family conflict. ~[facility] discussed coping skills 
and   
supports with patient that can be implemented and reminded patient of ways to 
access   
additional resources. ~[facility] discussed crisis resources and plan. Patient has 
crisis   
resources still available should they be needed  
   
                                                    Last Documented On 06/10/202  
1 7:04PM ; [facility]  
   
                                                    Discussed nutritional needs   
teach healthy choices including fruits and   
vegetables  
   
                                                    Last Documented On 06/10/202  
1 3:57PM ; [facility]  
   
                                                    Patient education about a pr  
oper diet  
   
                                                    Last Documented On 06/10/202  
1 3:57PM ; [facility]  
   
                                                    Discussed concerns about exe  
rcise : promote physical activity  
   
                                                    Last Documented On 06/10/202  
1 3:57PM ; [facility]. [facility] and PCP reassured   
patient of not sharing information with anyone unless she has signed a release 
for us   
to do so. ~[facility] provided active listening, support and helped patient process 
through   
current symptoms and stressors. ~[facility] discussed establishing counseling and   
psychiatry. P discussed EMDR therapy and ways to find provider who does this 
type   
of therapy. ~[facility] discussed crisis resources should mood worsen, [facility] provided 
text   
hotline number for crisis. [facility] reviewed crisis plan with patient and patient is 
able   
to contact positive supports and family when feeling down  
   
                                                    Last Documented On   
1 11:46AM ; [facility]  
   
                                                    Discussed nutritional needs   
teach healthy choices including fruits and   
vegetables  
   
                                                    Last Documented On   
1 2:11PM ; [facility]  
   
                                                    Patient education about a pr  
oper diet  
   
                                                    Last Documented On   
1 2:11PM ; [facility]  
   
                                                    Discussed concerns about exe  
rcise : promote physical activity  
   
                                                    Last Documented On   
1 2:11PM ; [facility]  
Work Phone: [phone]  
  
Includes: Instructions for all patient encounters  
  
  
  
                                                    Education and Decision Aids   
were provided during visit for:  
   
                                                    Counseling/education [Use fo  
r free text]  
   
                                                    Last Documented On   
4 6:27PM ; [facility]  
   
                                                    Discussed nutritional needs   
teach healthy choices including fruits and   
vegetables  
   
                                                    Last Documented On   
4 4:51PM ; [facility]  
   
                                                    Patient education about a pr  
oper diet  
   
                                                    Last Documented On   
4 4:51PM ; [facility]  
   
                                                    Discussed concerns about exe  
rcise : promote physical activity  
   
                                                    Last Documented On   
4 4:51PM ; [facility]  
   
                                                    Referred Patient to a [facility]  
es Self-Management Program  
   
                                                    Last Documented On   
4 5:22PM ; [facility] offered active and suppo  
rtive listening and processed current stressors.   
~[facility]   
discussed coping skills and supports that can be implemented to manage increased
  
anxiety and stressors. [facility] discussed potential of resuming counseling, even if 
for   
monthly visits to process ongoing stressors. ~[facility] encouraged patient to follow-
up on   
referrals as discussed with PCP  
   
                                                    Last Documented On   
4 10:08AM ; [facility]  
   
                                                    Discussed nutritional needs   
teach healthy choices including fruits and   
vegetables  
   
                                                    Last Documented On   
4 4:46PM ; [facility]  
   
                                                    Patient education about a pr  
oper diet  
   
                                                    Last Documented On   
4 4:46PM ; [facility]  
   
                                                    Discussed concerns about exe  
rcise : promote physical activity  
   
                                                    Last Documented On   
4 4:46PM ; [facility]  
   
                                                    Not requesting contraception  
   
                                                    Last Documented On   
4 4:46PM ; [facility] offered active and suppo  
rtive listening and processed current stressors   
related   
to getting medications. ~P discussed coping skills and supports to implement 
in   
daily routine. ~P discussed progress patient has felt they have made recently 
and   
encouraged continued follow-up with providers to address health  
   
                                                    Last Documented On   
4 5:16PM ; [facility]  
   
                                                    Discussed nutritional needs   
teach healthy choices including fruits and   
vegetables  
   
                                                    Last Documented On   
4 7:14PM ; [facility]  
   
                                                    Patient education about a pr  
oper diet  
   
                                                    Last Documented On   
4 7:14PM ; [facility]  
   
                                                    Discussed concerns about exe  
rcise : promote physical activity  
   
                                                    Last Documented On   
4 7:14PM ; [facility]  
   
                                                    Not requesting contraception  
   
                                                    Last Documented On   
4 7:14PM ; [facility]  
   
                                                    Discussed nutritional needs   
teach healthy choices including fruits and   
vegetables  
   
                                                    Last Documented On   
3 5:15PM ; [facility]  
   
                                                    Patient education about a pr  
oper diet  
   
                                                    Last Documented On   
3 5:15PM ; [facility]  
   
                                                    Discussed concerns about exe  
rcise : promote physical activity  
   
                                                    Last Documented On   
3 5:15PM ; [facility]  
   
                                                    Discussed nutritional needs   
teach healthy choices including fruits and   
vegetables  
   
                                                    Last Documented On 10/21/202  
2 1:42PM ; [facility]  
   
                                                    Patient education about a pr  
oper diet  
   
                                                    Last Documented On 10/21/202  
2 1:42PM ; [facility]  
   
                                                    Discussed concerns about exe  
rcise : promote physical activity  
   
                                                    Last Documented On 10/21/202  
2 1:42PM ; [facility] offered active listening  
 and supportive feedback; normalized emotions and   
feelings, also provided pt time to process any current stressors. ~Promoted and   
encouraged follow-through with scheduling psychiatric services  
   
                                                    Last Documented On   
2 3:21PM ; [facility] provided supportive, empa  
thic listening and reflective feedback. ~Explored,   
encouraged, and supported the pt to discuss current sx/mood, assess risk for   
harm/need, coping mechanisms, support network and safety planning. ~Supported 
pt's   
plan to f/up with Dr. Alonso, as planned at [place], [place]. ~Encouraged 
pt to   
use safety plan, if needed to ensure she remains safe  
   
                                                    Last Documented On   
2 2:27PM ; [facility]  
   
                                                    Discussed nutritional needs   
teach healthy choices including fruits and   
vegetables  
   
                                                    Last Documented On   
2 3:20PM ; [facility]  
   
                                                    Patient education about a pr  
oper diet  
   
                                                    Last Documented On   
2 3:20PM ; [facility]  
   
                                                    Discussed concerns about exe  
rcise : promote physical activity ~ ~Will restart   
trazodone and prazosin ~ ~Patient is planning to have brother stay with her for 
a few   
days for emotional support ~ ~Follow up with PCP at next scheduled visit ~ ~Call
  
psychiatrist office to schedule appt ~ ~Call counselor  
   
                                                    Last Documented On   
2 9:35AM ; [facility]  
   
                                                    Provided supportive listenin  
g and empathic feedback; encouraged, explored, and   
supported the pt as she processed current symptoms, Issues, and concerns. 
~Discussed   
past tx and explored current needs/options. Acknowledged and validated pt's 
thoughts   
and emotions. ~Explored coping mechanisms and support network; utilized 
opportunity   
for safety planning; promoted seeking positive support and seeking help, as 
needed  
   
                                                    Last Documented On   
2 3:28PM ; [facility] provided active listenin  
g, support and helped pt process though current   
symptoms   
and stressor(s). Discussed and explored past effectiveness of medication; 
identified   
objectives and future goals; promoted use of healthy coping mechanisms, and 
self-care   
practices  
   
                                                    Last Documented On   
2 3:19PM ; [facility]  
   
                                                    Reviewed side effects and Ri  
sks/Benefits analysis  
   
                                                    Last Documented On   
2 3:19PM ; [facility]  
   
                                                    Discussed nutritional needs   
teach healthy choices including fruits and   
vegetables  
   
                                                    Last Documented On   
2 3:15PM ; [facility]  
   
                                                    Patient education about a pr  
oper diet  
   
                                                    Last Documented On   
2 3:15PM ; [facility]  
   
                                                    Discussed concerns about exe  
rcise : promote physical activity  
   
                                                    Last Documented On   
2 3:15PM ; [facility] introduced pt to [facility] in  
tegrated model of care ~P offered active listening  
and   
supportive feedback; normalized emotions and feelings, also provided pt time to   
process any current stressors ~P discussed potential benefits of counseling 
and   
supported re-engaging, as needed. ~P encouraged pt to continue to make time to
  
implement self-care regimen and use coping methods, as needed  
   
                                                    Last Documented On   
2 4:07PM ; [facility]  
   
                                                    Discussed nutritional needs   
teach healthy choices including fruits and   
vegetables  
   
                                                    Last Documented On   
2 3:15PM ; [facility]  
   
                                                    Patient education about a pr  
oper diet  
   
                                                    Last Documented On   
2 3:15PM ; [facility]  
   
                                                    Inquiry and counseling about  
 medication administration and compliance  
   
                                                    Last Documented On   
2 7:37PM ; [facility]  
   
                                                    Discussed concerns about exe  
rcise : promote physical activity  
   
                                                    Last Documented On   
2 3:15PM ; [facility]  
   
                                                    Patient goals discussed  
   
                                                    Last Documented On   
2 7:37PM ; [facility]  
   
                                                    Ansewred pt's questions re B  
orderlline Personality D/O and Bipolar D/O raised by  
  
psychiatrist at [place]. ~Validated and normalized patient?s feelings while   
assisting to process recent events  
   
                                                    Last Documented On   
1 1:33AM ; [facility]  
   
                                                    Discussed nutritional needs   
teach healthy choices including fruits and   
vegetables  
   
                                                    Last Documented On   
1 2:04PM ; [facility]  
   
                                                    Patient education about a pr  
oper diet  
   
                                                    Last Documented On   
1 2:04PM ; [facility]  
   
                                                    Discussed concerns about exe  
rcise : promote physical activity  
   
                                                    Last Documented On   
1 2:04PM ; [facility] provided active listenin  
g, support and helped patient process through   
current   
symptoms and stressors with ongoing mental health concerns and medication 
changes.   
~[facility] discussed coping skills and supports that patient is implementing.  
discussed   
implementing coping skills as discussed with counseling and attending weekly   
appointments as scheduled with counselor. Patient was encouraged to continue 
writing   
down concerns with medications and discuss with providers. ~[facility] reminded patient
of   
crisis resources should they be needed. Patient reports having crisis resources 
and   
could return to ER  
   
                                                    Last Documented On   
1 10:17AM ; [facility]  
   
                                                    Patient education about a pr  
oper diet  
   
                                                    Last Documented On   
1 5:17PM ; [facility]  
   
                                                    Patient education about meal  
 planning  
   
                                                    Last Documented On  5:17PM ; [facility]  
   
                                                    Education about changing eat  
ing habits  
   
                                                    Last Documented On  5:17PM ; [facility]  
   
                                                    Patient education about high  
 fiber diet  
   
                                                    Last Documented On  5:17PM ; [facility]  
   
                                                    Patient education about low   
fat diet  
   
                                                    Last Documented On   
1 5:17PM ; [facility]  
   
                                                    Patient education about low   
cholesterol diet  
   
                                                    Last Documented On  5:17PM ; [facility]  
   
                                                    Patient education about low   
carbohydrate diet  
   
                                                    Last Documented On  5:17PM ; [facility]  
   
                                                    Patient education about high  
 protein diet  
   
                                                    Last Documented On  5:17PM ; [facility] offered active and suppo  
rtive listening, normalized emotions and feelings,   
and   
processed current stressors. ~[facility] discussed resources for finding a counselor 
and   
provided list of local resources. ~[facility] discussed patients coping skills and 
supports   
and encouraged patient to continue to implement. ~[facility] reminded patient of crisis
  
resources should they be needed  
   
                                                    Last Documented On  7:15PM ; [facility]  
   
                                                    Discussed nutritional needs   
teach healthy choices including fruits and   
vegetables  
   
                                                    Last Documented On  11:38AM ; [facility]  
   
                                                    Patient education about a pr  
oper diet  
   
                                                    Last Documented On  11:38AM ; [facility]  
   
                                                    Patient education about a pr  
oper diet  
   
                                                    Last Documented On   
1 12:19PM ; [facility]  
   
                                                    Patient education about meal  
 planning  
   
                                                    Last Documented On   
1 12:19PM ; [facility]  
   
                                                    Education about changing eat  
ing habits  
   
                                                    Last Documented On  12:19PM ; [facility]  
   
                                                    Patient education about high  
 fiber diet  
   
                                                    Last Documented On  12:19PM ; [facility]  
   
                                                    Patient education about low   
fat diet  
   
                                                    Last Documented On  12:19PM ; [facility]  
   
                                                    Patient education about low   
cholesterol diet  
   
                                                    Last Documented On  12:19PM ; [facility]  
   
                                                    Patient education about low   
carbohydrate diet  
   
                                                    Last Documented On   
1 12:19PM ; [facility]  
   
                                                    Patient education about high  
 protein diet  
   
                                                    Last Documented On   
1 12:19PM ; [facility]  
   
                                                    Discussed concerns about exe  
rcise : promote physical activity  
   
                                                    Last Documented On   
1 11:38AM ; [facility] provided active listenin  
g, support and helped patient process through   
current   
symptoms and stressors related to family conflict. ~[facility] discussed coping skills 
and   
supports with patient that can be implemented and reminded patient of ways to 
access   
additional resources. ~[facility] discussed crisis resources and plan. Patient has 
crisis   
resources still available should they be needed  
   
                                                    Last Documented On 06/10/202  
1 7:04PM ; [facility]  
   
                                                    Discussed nutritional needs   
teach healthy choices including fruits and   
vegetables  
   
                                                    Last Documented On 06/10/202  
1 3:57PM ; [facility]  
   
                                                    Patient education about a pr  
oper diet  
   
                                                    Last Documented On 06/10/202  
1 3:57PM ; [facility]  
   
                                                    Discussed concerns about exe  
rcise : promote physical activity  
   
                                                    Last Documented On 06/10/202  
1 3:57PM ; [facility] introduced patient to [facility] integrated model of care. P and PCP reassured   
patient of not sharing information with anyone unless she has signed a release 
for us   
to do so. ~[facility] provided active listening, support and helped patient process 
through   
current symptoms and stressors. ~[facility] discussed establishing counseling and   
psychiatry. [facility] discussed EMDR therapy and ways to find provider who does this 
type   
of therapy. ~[facility] discussed crisis resources should mood worsen, [facility] provided 
text   
hotline number for crisis. [facility] reviewed crisis plan with patient and patient is 
able   
to contact positive supports and family when feeling down  
   
                                                    Last Documented On   
1 11:46AM ; [facility]  
   
                                                    Discussed nutritional needs   
teach healthy choices including fruits and   
vegetables  
   
                                                    Last Documented On   
1 2:11PM ; [facility]  
   
                                                    Patient education about a pr  
oper diet  
   
                                                    Last Documented On   
1 2:11PM ; [facility]  
   
                                                    Discussed concerns about exe  
rcise : promote physical activity  
   
                                                    Last Documented On   
1 2:11PM ; [facility]  
Work Phone: [phone]  
  
Includes: Instructions for all patient encounters  
  
  
  
                                                    Education and Decision Aids   
were provided during visit for:  
   
                                                    P offered active and suppo  
rtive listening and processed recent stressors. ~P  
  
discussed coping skills and supports to implement in managing increased anxiety 
such   
as tools learned previously in therapy. ~P discussed sleep hygiene strategies 
that   
can be implemented. ~P encouraged patient to follow-up with specialists as   
scheduled  
   
                                                    Last Documented On   
4 3:26PM ; [facility]  
   
                                                    Discussed nutritional needs   
teach healthy choices including fruits and   
vegetables  
   
                                                    Last Documented On   
4 4:51PM ; [facility]  
   
                                                    Patient education about a pr  
oper diet  
   
                                                    Last Documented On   
4 4:51PM ; [facility]  
   
                                                    Discussed concerns about exe  
rcise : promote physical activity  
   
                                                    Last Documented On   
4 4:51PM ; [facility]  
   
                                                    Referred Patient to a [facility]  
es Self-Management Program  
   
                                                    Last Documented On   
4 5:22PM ; [facility] offered active and suppo  
rtive listening and processed current stressors.   
~[facility]   
discussed coping skills and supports that can be implemented to manage increased
  
anxiety and stressors. [facility] discussed potential of resuming counseling, even if 
for   
monthly visits to process ongoing stressors. ~[facility] encouraged patient to follow-
up on   
referrals as discussed with PCP  
   
                                                    Last Documented On   
4 10:08AM ; [facility]  
   
                                                    Discussed nutritional needs   
teach healthy choices including fruits and   
vegetables  
   
                                                    Last Documented On   
4 4:46PM ; [facility]  
   
                                                    Patient education about a pr  
oper diet  
   
                                                    Last Documented On   
4 4:46PM ; [facility]  
   
                                                    Discussed concerns about exe  
rcise : promote physical activity  
   
                                                    Last Documented On   
4 4:46PM ; [facility]  
   
                                                    Not requesting contraception  
   
                                                    Last Documented On   
4 4:46PM ; [facility] offered active and suppo  
rtive listening and processed current stressors   
related   
to getting medications. ~P discussed coping skills and supports to implement 
in   
daily routine. ~P discussed progress patient has felt they have made recently 
and   
encouraged continued follow-up with providers to address health  
   
                                                    Last Documented On   
4 5:16PM ; [facility]  
   
                                                    Discussed nutritional needs   
teach healthy choices including fruits and   
vegetables  
   
                                                    Last Documented On   
4 7:14PM ; [facility]  
   
                                                    Patient education about a pr  
oper diet  
   
                                                    Last Documented On   
4 7:14PM ; [facility]  
   
                                                    Discussed concerns about exe  
rcise : promote physical activity  
   
                                                    Last Documented On   
4 7:14PM ; [facility]  
   
                                                    Not requesting contraception  
   
                                                    Last Documented On   
4 7:14PM ; [facility]  
   
                                                    Discussed nutritional needs   
teach healthy choices including fruits and   
vegetables  
   
                                                    Last Documented On   
3 5:15PM ; [facility]  
   
                                                    Patient education about a pr  
oper diet  
   
                                                    Last Documented On   
3 5:15PM ; [facility]  
   
                                                    Discussed concerns about exe  
rcise : promote physical activity  
   
                                                    Last Documented On   
3 5:15PM ; [facility]  
   
                                                    Discussed nutritional needs   
teach healthy choices including fruits and   
vegetables  
   
                                                    Last Documented On 10/21/202  
2 1:42PM ; [facility]  
   
                                                    Patient education about a pr  
oper diet  
   
                                                    Last Documented On 10/21/202  
2 1:42PM ; [facility]  
   
                                                    Discussed concerns about exe  
rcise : promote physical activity  
   
                                                    Last Documented On 10/21/202  
2 1:42PM [facility]  
 and supportive feedback; normalized emotions and   
feelings, also provided pt time to process any current stressors. ~Promoted and   
encouraged follow-through with scheduling psychiatric services  
   
                                                    Last Documented On   
2 3:21PM ; [facility] provided supportive, empa  
thic listening and reflective feedback. ~Explored,   
encouraged, and supported the pt to discuss current sx/mood, assess risk for   
harm/need, coping mechanisms, support network and safety planning. ~Supported 
pt's   
plan to f/up with Dr. Alonso, as planned at [place], [place]. ~Encouraged 
pt to   
use safety plan, if needed to ensure she remains safe  
   
                                                    Last Documented On   
2 2:27PM ; [facility]  
   
                                                    Discussed nutritional needs   
teach healthy choices including fruits and   
vegetables  
   
                                                    Last Documented On   
2 3:20PM ; [facility]  
   
                                                    Patient education about a pr  
oper diet  
   
                                                    Last Documented On   
2 3:20PM ; [facility]  
   
                                                    Discussed concerns about exe  
rcise : promote physical activity ~ ~Will restart   
trazodone and prazosin ~ ~Patient is planning to have brother stay with her for 
a few   
days for emotional support ~ ~Follow up with PCP at next scheduled visit ~ ~Call
  
psychiatrist office to schedule appt ~ ~Call counselor  
   
                                                    Last Documented On   
2 9:35AM ; [facility]  
   
                                                    Provided supportive listenin  
g and empathic feedback; encouraged, explored, and   
supported the pt as she processed current symptoms, Issues, and concerns. 
~Discussed   
past tx and explored current needs/options. Acknowledged and validated pt's 
thoughts   
and emotions. ~Explored coping mechanisms and support network; utilized 
opportunity   
for safety planning; promoted seeking positive support and seeking help, as 
needed  
   
                                                    Last Documented On   
2 3:28PM ; [facility] provided active listenin  
g, support and helped pt process though current   
symptoms   
and stressor(s). Discussed and explored past effectiveness of medication; 
identified   
objectives and future goals; promoted use of healthy coping mechanisms, and 
self-care   
practices  
   
                                                    Last Documented On   
2 3:19PM ; [facility]  
   
                                                    Reviewed side effects and Ri  
sks/Benefits analysis  
   
                                                    Last Documented On   
2 3:19PM ; [facility]  
   
                                                    Discussed nutritional needs   
teach healthy choices including fruits and   
vegetables  
   
                                                    Last Documented On   
2 3:15PM ; [facility]  
   
                                                    Patient education about a pr  
oper diet  
   
                                                    Last Documented On   
2 3:15PM ; [facility]  
   
                                                    Discussed concerns about exe  
rcise : promote physical activity  
   
                                                    Last Documented On   
2 3:15PM ; [facility] introduced pt to [facility] in  
tegrated model of care ~[facility] offered active listening  
and   
supportive feedback; normalized emotions and feelings, also provided pt time to   
process any current stressors ~[facility] discussed potential benefits of counseling 
and   
supported re-engaging, as needed. ~[facility] encouraged pt to continue to make time to
  
implement self-care regimen and use coping methods, as needed  
   
                                                    Last Documented On   
2 4:07PM ; [facility]  
   
                                                    Discussed nutritional needs   
teach healthy choices including fruits and   
vegetables  
   
                                                    Last Documented On   
2 3:15PM ; [facility]  
   
                                                    Patient education about a pr  
oper diet  
   
                                                    Last Documented On   
2 3:15PM ; [facility]  
   
                                                    Inquiry and counseling about  
 medication administration and compliance  
   
                                                    Last Documented On   
2 7:37PM ; [facility]  
   
                                                    Discussed concerns about exe  
rcise : promote physical activity  
   
                                                    Last Documented On   
2 3:15PM ; [facility]  
   
                                                    Patient goals discussed  
   
                                                    Last Documented On   
2 7:37PM ; [facility]  
   
                                                    Ansewred pt's questions re B  
orderlline Personality D/O and Bipolar D/O raised by  
  
psychiatrist at [place]. ~Validated and normalized patient?s feelings while   
assisting to process recent events  
   
                                                    Last Documented On   
1 1:33AM ; [facility]  
   
                                                    Discussed nutritional needs   
teach healthy choices including fruits and   
vegetables  
   
                                                    Last Documented On   
1 2:04PM ; [facility]  
   
                                                    Patient education about a pr  
oper diet  
   
                                                    Last Documented On   
1 2:04PM ; [facility]  
   
                                                    Discussed concerns about exe  
rcise : promote physical activity  
   
                                                    Last Documented On   
1 2:04PM ; [facility]  
   
                                                    [facility] provided active listenin  
g, support and helped patient process through   
current   
symptoms and stressors with ongoing mental health concerns and medication 
changes.   
~[facility] discussed coping skills and supports that patient is implementing.  
discussed   
implementing coping skills as discussed with counseling and attending weekly   
appointments as scheduled with counselor. Patient was encouraged to continue 
writing   
down concerns with medications and discuss with providers. ~[facility] reminded patient
of   
crisis resources should they be needed. Patient reports having crisis resources 
and   
could return to ER  
   
                                                    Last Documented On   
1 10:17AM ; [facility]  
   
                                                    Patient education about a pr  
oper diet  
   
                                                    Last Documented On   
1 5:17PM ; [facility]  
   
                                                    Patient education about meal  
 planning  
   
                                                    Last Documented On   
1 5:17PM ; [facility]  
   
                                                    Education about changing eat  
ing habits  
   
                                                    Last Documented On   
1 5:17PM ; [facility]  
   
                                                    Patient education about high  
 fiber diet  
   
                                                    Last Documented On   
1 5:17PM ; [facility]  
   
                                                    Patient education about low   
fat diet  
   
                                                    Last Documented On   
1 5:17PM ; [facility]  
   
                                                    Patient education about low   
cholesterol diet  
   
                                                    Last Documented On   
1 5:17PM ; [facility]  
   
                                                    Patient education about low   
carbohydrate diet  
   
                                                    Last Documented On   
1 5:17PM ; [facility]  
   
                                                    Patient education about high  
 protein diet  
   
                                                    Last Documented On   
1 5:17PM ; [facility] offered active and suppo  
rtive listening, normalized emotions and feelings,   
and   
processed current stressors. ~[facility] discussed resources for finding a counselor 
and   
provided list of local resources. ~[facility] discussed patients coping skills and 
supports   
and encouraged patient to continue to implement. ~[facility] reminded patient of crisis
  
resources should they be needed  
   
                                                    Last Documented On   
1 7:15PM ; [facility]  
   
                                                    Discussed nutritional needs   
teach healthy choices including fruits and   
vegetables  
   
                                                    Last Documented On   
1 11:38AM ; [facility]  
   
                                                    Patient education about a pr  
oper diet  
   
                                                    Last Documented On   
1 11:38AM ; [facility]  
   
                                                    Patient education about a pr  
oper diet  
   
                                                    Last Documented On   
1 12:19PM ; [facility]  
   
                                                    Patient education about meal  
 planning  
   
                                                    Last Documented On  12:19PM ; [facility]  
   
                                                    Education about changing eat  
ing habits  
   
                                                    Last Documented On   
1 12:19PM ; [facility]  
   
                                                    Patient education about high  
 fiber diet  
   
                                                    Last Documented On   
1 12:19PM ; [facility]  
   
                                                    Patient education about low   
fat diet  
   
                                                    Last Documented On   
1 12:19PM ; [facility]  
   
                                                    Patient education about low   
cholesterol diet  
   
                                                    Last Documented On   
1 12:19PM ; [facility]  
   
                                                    Patient education about low   
carbohydrate diet  
   
                                                    Last Documented On   
1 12:19PM ; [facility]  
   
                                                    Patient education about high  
 protein diet  
   
                                                    Last Documented On   
1 12:19PM ; [facility]  
   
                                                    Discussed concerns about exe  
rcise : promote physical activity  
   
                                                    Last Documented On   
1 11:38AM ; [facility] provided active listenin  
g, support and helped patient process through   
current   
symptoms and stressors related to family conflict. ~[facility] discussed coping skills 
and   
supports with patient that can be implemented and reminded patient of ways to 
access   
additional resources. ~[facility] discussed crisis resources and plan. Patient has 
crisis   
resources still available should they be needed  
   
                                                    Last Documented On 06/10/202  
1 7:04PM ; [facility]  
   
                                                    Discussed nutritional needs   
teach healthy choices including fruits and   
vegetables  
   
                                                    Last Documented On 06/10/202  
1 3:57PM ; [facility]  
   
                                                    Patient education about a pr  
oper diet  
   
                                                    Last Documented On 06/10/202  
1 3:57PM ; [facility]  
   
                                                    Discussed concerns about exe  
rcise : promote physical activity  
   
                                                    Last Documented On 06/10/202  
1 3:57PM ; [facility] introduced patient to [facility] integrated model of care. [facility] and PCP reassured   
patient of not sharing information with anyone unless she has signed a release 
for us   
to do so. ~P provided active listening, support and helped patient process 
through   
current symptoms and stressors. ~P discussed establishing counseling and   
psychiatry. P discussed EMDR therapy and ways to find provider who does this 
type   
of therapy. ~[facility] discussed crisis resources should mood worsen, [facility] provided 
text   
hotline number for crisis. [facility] reviewed crisis plan with patient and patient is 
able   
to contact positive supports and family when feeling down  
   
                                                    Last Documented On   
1 11:46AM ; [facility]  
   
                                                    Discussed nutritional needs   
teach healthy choices including fruits and   
vegetables  
   
                                                    Last Documented On   
1 2:11PM ; [facility]  
   
                                                    Patient education about a pr  
oper diet  
   
                                                    Last Documented On   
1 2:11PM ; [facility]  
   
                                                    Discussed concerns about exe  
rcise : promote physical activity  
   
                                                    Last Documented On   
1 2:11PM ; [facility]  
Work Phone: [phone]  
  
Includes: Instructions for all patient encounters  
  
  
  
                                                    Education and Decision Aids   
were provided during visit for:  
   
                                                    ~*[facility] offered active and sup  
portive listening, normalized emotions and feelings,  
and   
processed ~current stressors. ~*Discussed engageing in counseling and looking 
into   
EMDR to address PTSD and increased nightmares  
   
                                                    Last Documented On   
4 4:14PM ; [facility]  
   
                                                    Discussed nutritional needs   
teach healthy choices including fruits and   
vegetables  
   
                                                    Last Documented On   
4 4:33PM ; [facility]  
   
                                                    Patient education about a pr  
oper diet  
   
                                                    Last Documented On   
4 4:33PM ; [facility]  
   
                                                    Discussed concerns about exe  
rcise : promote physical activity  
   
                                                    Last Documented On   
4 4:33PM ; [facility] offered active and suppo  
rtive listening and processed recent stressors. ~[facility]  
  
discussed coping skills and supports to implement in managing increased anxiety 
such   
as tools learned previously in therapy. ~P discussed sleep hygiene strategies 
that   
can be implemented. ~P encouraged patient to follow-up with specialists as   
scheduled  
   
                                                    Last Documented On   
4 3:26PM ; [facility]  
   
                                                    Discussed nutritional needs   
teach healthy choices including fruits and   
vegetables  
   
                                                    Last Documented On   
4 4:51PM ; [facility]  
   
                                                    Patient education about a pr  
oper diet  
   
                                                    Last Documented On   
4 4:51PM ; [facility]  
   
                                                    Discussed concerns about exe  
rcise : promote physical activity  
   
                                                    Last Documented On   
4 4:51PM ; [facility]  
   
                                                    Referred Patient to a [facility]  
es Self-Management Program  
   
                                                    Last Documented On   
4 5:22PM [facility]  
rtive listening and processed current stressors.   
~P   
discussed coping skills and supports that can be implemented to manage increased
  
anxiety and stressors. P discussed potential of resuming counseling, even if 
for   
monthly visits to process ongoing stressors. ~[facility] encouraged patient to follow-
up on   
referrals as discussed with PCP  
   
                                                    Last Documented On   
4 10:08AM ; [facility]  
   
                                                    Discussed nutritional needs   
teach healthy choices including fruits and   
vegetables  
   
                                                    Last Documented On   
4 4:46PM ; [facility]  
   
                                                    Patient education about a pr  
oper diet  
   
                                                    Last Documented On   
4 4:46PM ; [facility]  
   
                                                    Discussed concerns about exe  
rcise : promote physical activity  
   
                                                    Last Documented On   
4 4:46PM ; [facility]  
   
                                                    Not requesting contraception  
   
                                                    Last Documented On   
4 4:46PM ; [facility] offered active and suppo  
rtive listening and processed current stressors   
related   
to getting medications. ~P discussed coping skills and supports to implement 
in   
daily routine. ~P discussed progress patient has felt they have made recently 
and   
encouraged continued follow-up with providers to address health  
   
                                                    Last Documented On   
4 5:16PM ; [facility]  
   
                                                    Discussed nutritional needs   
teach healthy choices including fruits and   
vegetables  
   
                                                    Last Documented On   
4 7:14PM ; [facility]  
   
                                                    Patient education about a pr  
oper diet  
   
                                                    Last Documented On   
4 7:14PM ; [facility]  
   
                                                    Discussed concerns about exe  
rcise : promote physical activity  
   
                                                    Last Documented On   
4 7:14PM ; [facility]  
   
                                                    Not requesting contraception  
   
                                                    Last Documented On   
4 7:14PM ; [facility]  
   
                                                    Discussed nutritional needs   
teach healthy choices including fruits and   
vegetables  
   
                                                    Last Documented On   
3 5:15PM ; [facility]  
   
                                                    Patient education about a pr  
oper diet  
   
                                                    Last Documented On   
3 5:15PM ; [facility]  
   
                                                    Discussed concerns about exe  
rcise : promote physical activity  
   
                                                    Last Documented On   
3 5:15PM ; [facility]  
   
                                                    Discussed nutritional needs   
teach healthy choices including fruits and   
vegetables  
   
                                                    Last Documented On 10/21/202  
2 1:42PM ; [facility]  
   
                                                    Patient education about a pr  
oper diet  
   
                                                    Last Documented On 10/21/202  
2 1:42PM ; [facility]  
   
                                                    Discussed concerns about exe  
rcise : promote physical activity  
   
                                                    Last Documented On 10/21/202  
2 1:42PM ; [facility]  
 and supportive feedback; normalized emotions and   
feelings, also provided pt time to process any current stressors. ~Promoted and   
encouraged follow-through with scheduling psychiatric services  
   
                                                    Last Documented On   
2 3:21PM ; [facility] provided supportive, empa  
thic listening and reflective feedback. ~Explored,   
encouraged, and supported the pt to discuss current sx/mood, assess risk for   
harm/need, coping mechanisms, support network and safety planning. ~Supported 
pt's   
plan to f/up with Dr. Alonso, as planned at [place], [place]. ~Encouraged 
pt to   
use safety plan, if needed to ensure she remains safe  
   
                                                    Last Documented On   
2 2:27PM ; [facility]  
   
                                                    Discussed nutritional needs   
teach healthy choices including fruits and   
vegetables  
   
                                                    Last Documented On   
2 3:20PM ; [facility]  
   
                                                    Patient education about a pr  
oper diet  
   
                                                    Last Documented On   
2 3:20PM ; [facility]  
   
                                                    Discussed concerns about exe  
rcise : promote physical activity ~ ~Will restart   
trazodone and prazosin ~ ~Patient is planning to have brother stay with her for 
a few   
days for emotional support ~ ~Follow up with PCP at next scheduled visit ~ ~Call
  
psychiatrist office to schedule appt ~ ~Call counselor  
   
                                                    Last Documented On   
2 9:35AM ; [facility]  
   
                                                    Provided supportive listenin  
g and empathic feedback; encouraged, explored, and   
supported the pt as she processed current symptoms, Issues, and concerns. 
~Discussed   
past tx and explored current needs/options. Acknowledged and validated pt's 
thoughts   
and emotions. ~Explored coping mechanisms and support network; utilized 
opportunity   
for safety planning; promoted seeking positive support and seeking help, as 
needed  
   
                                                    Last Documented On   
2 3:28PM ; [facility] provided active listenin  
g, support and helped pt process though current   
symptoms   
and stressor(s). Discussed and explored past effectiveness of medication; 
identified   
objectives and future goals; promoted use of healthy coping mechanisms, and 
self-care   
practices  
   
                                                    Last Documented On   
2 3:19PM ; [facility]  
   
                                                    Reviewed side effects and Ri  
sks/Benefits analysis  
   
                                                    Last Documented On   
2 3:19PM ; [facility]  
   
                                                    Discussed nutritional needs   
teach healthy choices including fruits and   
vegetables  
   
                                                    Last Documented On   
2 3:15PM ; [facility]  
   
                                                    Patient education about a pr  
oper diet  
   
                                                    Last Documented On   
2 3:15PM ; [facility]  
   
                                                    Discussed concerns about exe  
rcise : promote physical activity  
   
                                                    Last Documented On   
2 3:15PM ; [facility] introduced pt to [facility] in  
tegrated model of care ~[facility] offered active listening  
and   
supportive feedback; normalized emotions and feelings, also provided pt time to   
process any current stressors ~[facility] discussed potential benefits of counseling 
and   
supported re-engaging, as needed. ~[facility] encouraged pt to continue to make time to
  
implement self-care regimen and use coping methods, as needed  
   
                                                    Last Documented On   
2 4:07PM ; [facility]  
   
                                                    Discussed nutritional needs   
teach healthy choices including fruits and   
vegetables  
   
                                                    Last Documented On   
2 3:15PM ; [facility]  
   
                                                    Patient education about a pr  
oper diet  
   
                                                    Last Documented On   
2 3:15PM ; [facility]  
   
                                                    Inquiry and counseling about  
 medication administration and compliance  
   
                                                    Last Documented On   
2 7:37PM ; [facility]  
   
                                                    Discussed concerns about exe  
rcise : promote physical activity  
   
                                                    Last Documented On   
2 3:15PM ; [facility]  
   
                                                    Patient goals discussed  
   
                                                    Last Documented On   
2 7:37PM ; [facility]  
   
                                                    Ansewred pt's questions re B  
orderlline Personality D/O and Bipolar D/O raised by  
  
psychiatrist at [place]. ~Validated and normalized patient?s feelings while   
assisting to process recent events  
   
                                                    Last Documented On   
1 1:33AM ; [facility]  
   
                                                    Discussed nutritional needs   
teach healthy choices including fruits and   
vegetables  
   
                                                    Last Documented On   
1 2:04PM ; [facility]  
   
                                                    Patient education about a pr  
oper diet  
   
                                                    Last Documented On   
1 2:04PM ; [facility]  
   
                                                    Discussed concerns about exe  
rcise : promote physical activity  
   
                                                    Last Documented On   
1 2:04PM ; [facility]  
   
                                                    [facility] provided active listenin  
g, support and helped patient process through   
current   
symptoms and stressors with ongoing mental health concerns and medication 
changes.   
~P discussed coping skills and supports that patient is implementing.  
discussed   
implementing coping skills as discussed with counseling and attending weekly   
appointments as scheduled with counselor. Patient was encouraged to continue 
writing   
down concerns with medications and discuss with providers. ~P reminded patient
of   
crisis resources should they be needed. Patient reports having crisis resources 
and   
could return to ER  
   
                                                    Last Documented On   
1 10:17AM ; [facility]  
   
                                                    Patient education about a pr  
oper diet  
   
                                                    Last Documented On   
1 5:17PM ; [facility]  
   
                                                    Patient education about meal  
 planning  
   
                                                    Last Documented On   
1 5:17PM ; [facility]  
   
                                                    Education about changing eat  
ing habits  
   
                                                    Last Documented On   
1 5:17PM ; [facility]  
   
                                                    Patient education about high  
 fiber diet  
   
                                                    Last Documented On   
1 5:17PM ; [facility]  
   
                                                    Patient education about low   
fat diet  
   
                                                    Last Documented On   
1 5:17PM ; [facility]  
   
                                                    Patient education about low   
cholesterol diet  
   
                                                    Last Documented On   
1 5:17PM ; [facility]  
   
                                                    Patient education about low   
carbohydrate diet  
   
                                                    Last Documented On   
1 5:17PM ; [facility]  
   
                                                    Patient education about high  
 protein diet  
   
                                                    Last Documented On   
1 5:17PM ; [facility] offered active and suppo  
rtive listening, normalized emotions and feelings,   
and   
processed current stressors. ~[facility] discussed resources for finding a counselor 
and   
provided list of local resources. ~[facility] discussed patients coping skills and 
supports   
and encouraged patient to continue to implement. ~[facility] reminded patient of crisis
  
resources should they be needed  
   
                                                    Last Documented On   
1 7:15PM ; [facility]  
   
                                                    Discussed nutritional needs   
teach healthy choices including fruits and   
vegetables  
   
                                                    Last Documented On   
1 11:38AM ; [facility]  
   
                                                    Patient education about a pr  
oper diet  
   
                                                    Last Documented On   
1 11:38AM ; [facility]  
   
                                                    Patient education about a pr  
oper diet  
   
                                                    Last Documented On   
1 12:19PM ; [facility]  
   
                                                    Patient education about meal  
 planning  
   
                                                    Last Documented On   
1 12:19PM ; [facility]  
   
                                                    Education about changing eat  
ing habits  
   
                                                    Last Documented On  12:19PM ; [facility]  
   
                                                    Patient education about high  
 fiber diet  
   
                                                    Last Documented On   
1 12:19PM ; [facility]  
   
                                                    Patient education about low   
fat diet  
   
                                                    Last Documented On   
1 12:19PM ; [facility]  
   
                                                    Patient education about low   
cholesterol diet  
   
                                                    Last Documented On   
1 12:19PM ; [facility]  
   
                                                    Patient education about low   
carbohydrate diet  
   
                                                    Last Documented On   
1 12:19PM ; [facility]  
   
                                                    Patient education about high  
 protein diet  
   
                                                    Last Documented On   
1 12:19PM ; [facility]  
   
                                                    Discussed concerns about exe  
rcise : promote physical activity  
   
                                                    Last Documented On   
1 11:38AM ; [facility] provided active listenin  
g, support and helped patient process through   
current   
symptoms and stressors related to family conflict. ~[facility] discussed coping skills 
and   
supports with patient that can be implemented and reminded patient of ways to 
access   
additional resources. ~[facility] discussed crisis resources and plan. Patient has 
crisis   
resources still available should they be needed  
   
                                                    Last Documented On 06/10/202  
1 7:04PM ; [facility]  
   
                                                    Discussed nutritional needs   
teach healthy choices including fruits and   
vegetables  
   
                                                    Last Documented On 06/10/202  
1 3:57PM ; [facility]  
   
                                                    Patient education about a pr  
oper diet  
   
                                                    Last Documented On 06/10/202  
1 3:57PM ; [facility]  
   
                                                    Discussed concerns about exe  
rcise : promote physical activity  
   
                                                    Last Documented On 06/10/202  
1 3:57PM ; [facility] introduced patient to [facility] integrated model of care. [facility] and PCP reassured   
patient of not sharing information with anyone unless she has signed a release 
for us   
to do so. ~P provided active listening, support and helped patient process 
through   
current symptoms and stressors. ~P discussed establishing counseling and   
psychiatry. P discussed EMDR therapy and ways to find provider who does this 
type   
of therapy. ~P discussed crisis resources should mood worsen, [facility] provided 
text   
hotline number for crisis. [facility] reviewed crisis plan with patient and patient is 
able   
to contact positive supports and family when feeling down  
   
                                                    Last Documented On   
1 11:46AM ; [facility]  
   
                                                    Discussed nutritional needs   
teach healthy choices including fruits and   
vegetables  
   
                                                    Last Documented On   
1 2:11PM ; [facility]  
   
                                                    Patient education about a pr  
oper diet  
   
                                                    Last Documented On   
1 2:11PM ; [facility]  
   
                                                    Discussed concerns about exe  
rcise : promote physical activity  
   
                                                    Last Documented On   
1 2:11PM ; [facility]  
Work Phone: [phone]  
  
Includes: Instructions for all patient encounters  
  
  
  
                                                    Education and Decision Aids   
were provided during visit for:  
   
                                                    ~*[facility] offered active and sup  
portive listening, normalized emotions and feelings,  
and   
processed ~current stressors. ~*Discussed engageing in counseling and looking 
into   
EMDR to address PTSD and increased nightmares  
   
                                                    Last Documented On   
4 4:14PM ; [facility]  
   
                                                    Discussed nutritional needs   
teach healthy choices including fruits and   
vegetables  
   
                                                    Last Documented On   
4 4:33PM ; [facility]  
   
                                                    Patient education about a pr  
oper diet  
   
                                                    Last Documented On   
4 4:33PM ; [facility]  
   
                                                    Discussed concerns about exe  
rcise : promote physical activity  
   
                                                    Last Documented On   
4 4:33PM ; [facility] offered active and suppo  
rtive listening and processed recent stressors. ~P  
  
discussed coping skills and supports to implement in managing increased anxiety 
such   
as tools learned previously in therapy. ~P discussed sleep hygiene strategies 
that   
can be implemented. ~[facility] encouraged patient to follow-up with specialists as   
scheduled  
   
                                                    Last Documented On   
4 3:26PM ; [facility]  
   
                                                    Discussed nutritional needs   
teach healthy choices including fruits and   
vegetables  
   
                                                    Last Documented On   
4 4:51PM ; [facility]  
   
                                                    Patient education about a pr  
oper diet  
   
                                                    Last Documented On   
4 4:51PM ; [facility]  
   
                                                    Discussed concerns about exe  
rcise : promote physical activity  
   
                                                    Last Documented On   
4 4:51PM ; [facility]  
   
                                                    Referred Patient to a [facility]  
es Self-Management Program  
   
                                                    Last Documented On   
4 5:22PM [facility]  
rtive listening and processed current stressors.   
~P   
discussed coping skills and supports that can be implemented to manage increased
  
anxiety and stressors. [facility] discussed potential of resuming counseling, even if 
for   
monthly visits to process ongoing stressors. ~[facility] encouraged patient to follow-
up on   
referrals as discussed with PCP  
   
                                                    Last Documented On   
4 10:08AM ; [facility]  
   
                                                    Discussed nutritional needs   
teach healthy choices including fruits and   
vegetables  
   
                                                    Last Documented On   
4 4:46PM ; [facility]  
   
                                                    Patient education about a pr  
oper diet  
   
                                                    Last Documented On   
4 4:46PM ; [facility]  
   
                                                    Discussed concerns about exe  
rcise : promote physical activity  
   
                                                    Last Documented On   
4 4:46PM ; [facility]  
   
                                                    Not requesting contraception  
   
                                                    Last Documented On   
4 4:46PM ; [facility] offered active and suppo  
rtive listening and processed current stressors   
related   
to getting medications. ~P discussed coping skills and supports to implement 
in   
daily routine. ~P discussed progress patient has felt they have made recently 
and   
encouraged continued follow-up with providers to address health  
   
                                                    Last Documented On   
4 5:16PM ; [facility]  
   
                                                    Discussed nutritional needs   
teach healthy choices including fruits and   
vegetables  
   
                                                    Last Documented On   
4 7:14PM ; [facility]  
   
                                                    Patient education about a pr  
oper diet  
   
                                                    Last Documented On   
4 7:14PM ; [facility]  
   
                                                    Discussed concerns about exe  
rcise : promote physical activity  
   
                                                    Last Documented On   
4 7:14PM ; [facility]  
   
                                                    Not requesting contraception  
   
                                                    Last Documented On   
4 7:14PM ; [facility]  
   
                                                    Discussed nutritional needs   
teach healthy choices including fruits and   
vegetables  
   
                                                    Last Documented On   
3 5:15PM ; [facility]  
   
                                                    Patient education about a pr  
oper diet  
   
                                                    Last Documented On   
3 5:15PM ; [facility]  
   
                                                    Discussed concerns about exe  
rcise : promote physical activity  
   
                                                    Last Documented On   
3 5:15PM ; [facility]  
   
                                                    Discussed nutritional needs   
teach healthy choices including fruits and   
vegetables  
   
                                                    Last Documented On 10/21/202  
2 1:42PM ; [facility]  
   
                                                    Patient education about a pr  
oper diet  
   
                                                    Last Documented On 10/21/202  
2 1:42PM ; [facility]  
   
                                                    Discussed concerns about exe  
rcise : promote physical activity  
   
                                                    Last Documented On 10/21/202  
2 1:42PM ; [facility]  
 and supportive feedback; normalized emotions and   
feelings, also provided pt time to process any current stressors. ~Promoted and   
encouraged follow-through with scheduling psychiatric services  
   
                                                    Last Documented On   
2 3:21PM ; [facility] provided supportive, empa  
thic listening and reflective feedback. ~Explored,   
encouraged, and supported the pt to discuss current sx/mood, assess risk for   
harm/need, coping mechanisms, support network and safety planning. ~Supported 
pt's   
plan to f/up with Dr. Alonso, as planned at [place], [place]. ~Encouraged 
pt to   
use safety plan, if needed to ensure she remains safe  
   
                                                    Last Documented On   
2 2:27PM ; [facility]  
   
                                                    Discussed nutritional needs   
teach healthy choices including fruits and   
vegetables  
   
                                                    Last Documented On   
2 3:20PM ; [facility]  
   
                                                    Patient education about a pr  
oper diet  
   
                                                    Last Documented On   
2 3:20PM ; [facility]  
   
                                                    Discussed concerns about exe  
rcise : promote physical activity ~ ~Will restart   
trazodone and prazosin ~ ~Patient is planning to have brother stay with her for 
a few   
days for emotional support ~ ~Follow up with PCP at next scheduled visit ~ ~Call
  
psychiatrist office to schedule appt ~ ~Call counselor  
   
                                                    Last Documented On   
2 9:35AM ; [facility]  
   
                                                    Provided supportive listenin  
g and empathic feedback; encouraged, explored, and   
supported the pt as she processed current symptoms, Issues, and concerns. 
~Discussed   
past tx and explored current needs/options. Acknowledged and validated pt's 
thoughts   
and emotions. ~Explored coping mechanisms and support network; utilized 
opportunity   
for safety planning; promoted seeking positive support and seeking help, as 
needed  
   
                                                    Last Documented On   
2 3:28PM ; [facility] provided active listenin  
g, support and helped pt process though current   
symptoms   
and stressor(s). Discussed and explored past effectiveness of medication; 
identified   
objectives and future goals; promoted use of healthy coping mechanisms, and 
self-care   
practices  
   
                                                    Last Documented On   
2 3:19PM ; [facility]  
   
                                                    Reviewed side effects and Ri  
sks/Benefits analysis  
   
                                                    Last Documented On   
2 3:19PM ; [facility]  
   
                                                    Discussed nutritional needs   
teach healthy choices including fruits and   
vegetables  
   
                                                    Last Documented On   
2 3:15PM ; [facility]  
   
                                                    Patient education about a pr  
oper diet  
   
                                                    Last Documented On   
2 3:15PM ; [facility]  
   
                                                    Discussed concerns about exe  
rcise : promote physical activity  
   
                                                    Last Documented On   
2 3:15PM ; [facility] introduced pt to [facility] in  
tegrated model of care ~[facility] offered active listening  
and   
supportive feedback; normalized emotions and feelings, also provided pt time to   
process any current stressors ~[facility] discussed potential benefits of counseling 
and   
supported re-engaging, as needed. ~[facility] encouraged pt to continue to make time to
  
implement self-care regimen and use coping methods, as needed  
   
                                                    Last Documented On   
2 4:07PM ; [facility]  
   
                                                    Discussed nutritional needs   
teach healthy choices including fruits and   
vegetables  
   
                                                    Last Documented On   
2 3:15PM ; [facility]  
   
                                                    Patient education about a pr  
oper diet  
   
                                                    Last Documented On   
2 3:15PM ; [facility]  
   
                                                    Inquiry and counseling about  
 medication administration and compliance  
   
                                                    Last Documented On   
2 7:37PM ; [facility]  
   
                                                    Discussed concerns about exe  
rcise : promote physical activity  
   
                                                    Last Documented On   
2 3:15PM ; [facility]  
   
                                                    Patient goals discussed  
   
                                                    Last Documented On   
2 7:37PM ; [facility]  
   
                                                    Ansewred pt's questions re B  
orderlline Personality D/O and Bipolar D/O raised by  
  
psychiatrist at [place]. ~Validated and normalized patient?s feelings while   
assisting to process recent events  
   
                                                    Last Documented On   
1 1:33AM ; [facility]  
   
                                                    Discussed nutritional needs   
teach healthy choices including fruits and   
vegetables  
   
                                                    Last Documented On   
1 2:04PM ; [facility]  
   
                                                    Patient education about a pr  
oper diet  
   
                                                    Last Documented On   
1 2:04PM ; [facility]  
   
                                                    Discussed concerns about exe  
rcise : promote physical activity  
   
                                                    Last Documented On   
1 2:04PM ; [facility] provided active listenin  
g, support and helped patient process through   
current   
symptoms and stressors with ongoing mental health concerns and medication 
changes.   
~P discussed coping skills and supports that patient is implementing.  
discussed   
implementing coping skills as discussed with counseling and attending weekly   
appointments as scheduled with counselor. Patient was encouraged to continue 
writing   
down concerns with medications and discuss with providers. ~P reminded patient
of   
crisis resources should they be needed. Patient reports having crisis resources 
and   
could return to ER  
   
                                                    Last Documented On   
1 10:17AM ; [facility]  
   
                                                    Patient education about a pr  
oper diet  
   
                                                    Last Documented On   
1 5:17PM ; [facility]  
   
                                                    Patient education about meal  
 planning  
   
                                                    Last Documented On   
1 5:17PM ; [facility]  
   
                                                    Education about changing eat  
ing habits  
   
                                                    Last Documented On   
1 5:17PM ; [facility]  
   
                                                    Patient education about high  
 fiber diet  
   
                                                    Last Documented On   
1 5:17PM ; [facility]  
   
                                                    Patient education about low   
fat diet  
   
                                                    Last Documented On   
1 5:17PM ; [facility]  
   
                                                    Patient education about low   
cholesterol diet  
   
                                                    Last Documented On   
1 5:17PM ; [facility]  
   
                                                    Patient education about low   
carbohydrate diet  
   
                                                    Last Documented On   
1 5:17PM ; [facility]  
   
                                                    Patient education about high  
 protein diet  
   
                                                    Last Documented On   
1 5:17PM ; [facility] offered active and suppo  
rtive listening, normalized emotions and feelings,   
and   
processed current stressors. ~P discussed resources for finding a counselor 
and   
provided list of local resources. ~[facility] discussed patients coping skills and 
supports   
and encouraged patient to continue to implement. [facility] reminded patient of crisis
  
resources should they be needed  
   
                                                    Last Documented On   
1 7:15PM ; [facility]  
   
                                                    Discussed nutritional needs   
teach healthy choices including fruits and   
vegetables  
   
                                                    Last Documented On   
1 11:38AM ; [facility]  
   
                                                    Patient education about a pr  
oper diet  
   
                                                    Last Documented On   
1 11:38AM ; [facility]  
   
                                                    Patient education about a pr  
oper diet  
   
                                                    Last Documented On   
1 12:19PM ; [facility]  
   
                                                    Patient education about meal  
 planning  
   
                                                    Last Documented On   
1 12:19PM ; [facility]  
   
                                                    Education about changing eat  
ing habits  
   
                                                    Last Documented On   
1 12:19PM ; [facility]  
   
                                                    Patient education about high  
 fiber diet  
   
                                                    Last Documented On   
1 12:19PM ; [facility]  
   
                                                    Patient education about low   
fat diet  
   
                                                    Last Documented On   
1 12:19PM ; [facility]  
   
                                                    Patient education about low   
cholesterol diet  
   
                                                    Last Documented On   
1 12:19PM ; [facility]  
   
                                                    Patient education about low   
carbohydrate diet  
   
                                                    Last Documented On   
1 12:19PM ; [facility]  
   
                                                    Patient education about high  
 protein diet  
   
                                                    Last Documented On   
1 12:19PM ; [facility]  
   
                                                    Discussed concerns about exe  
rcise : promote physical activity  
   
                                                    Last Documented On   
1 11:38AM ; [facility] provided active listenin  
g, support and helped patient process through   
current   
symptoms and stressors related to family conflict. [facility] discussed coping skills 
and   
supports with patient that can be implemented and reminded patient of ways to 
access   
additional resources. [facility] discussed crisis resources and plan. Patient has 
crisis   
resources still available should they be needed  
   
                                                    Last Documented On 06/10/202  
1 7:04PM ; [facility]  
   
                                                    Discussed nutritional needs   
teach healthy choices including fruits and   
vegetables  
   
                                                    Last Documented On 06/10/202  
1 3:57PM ; [facility]  
   
                                                    Patient education about a pr  
oper diet  
   
                                                    Last Documented On 06/10/202  
1 3:57PM ; [facility]  
   
                                                    Discussed concerns about exe  
rcise : promote physical activity  
   
                                                    Last Documented On 06/10/202  
1 3:57PM [facility] and PCP reassured   
patient of not sharing information with anyone unless she has signed a release 
for us   
to do so. ~P provided active listening, support and helped patient process 
through   
current symptoms and stressors. ~P discussed establishing counseling and   
psychiatry. P discussed EMDR therapy and ways to find provider who does this 
type   
of therapy. ~P discussed crisis resources should mood worsen, [facility] provided 
text   
hotline number for crisis. [facility] reviewed crisis plan with patient and patient is 
able   
to contact positive supports and family when feeling down  
   
                                                    Last Documented On   
1 11:46AM ; [facility]  
   
                                                    Discussed nutritional needs   
teach healthy choices including fruits and   
vegetables  
   
                                                    Last Documented On   
1 2:11PM ; [facility]  
   
                                                    Patient education about a pr  
oper diet  
   
                                                    Last Documented On   
1 2:11PM ; [facility]  
   
                                                    Discussed concerns about exe  
rcise : promote physical activity  
   
                                                    Last Documented On   
1 2:11PM ; [facility]  
Work Phone: [phone]  
  
Includes: Instructions for all patient encounters  
  
  
  
                                                    Education and Decision Aids   
were provided during visit for:  
   
                                                    ~*[facility] offered active and sup  
portive listening, normalized emotions and feelings,  
and   
processed ~current stressors. ~*Discussed engageing in counseling and looking 
into   
EMDR to address PTSD and increased nightmares  
   
                                                    Last Documented On   
4 4:14PM ; [facility]  
   
                                                    Discussed nutritional needs   
teach healthy choices including fruits and   
vegetables  
   
                                                    Last Documented On   
4 4:33PM ; [facility]  
   
                                                    Patient education about a pr  
oper diet  
   
                                                    Last Documented On   
4 4:33PM ; [facility]  
   
                                                    Discussed concerns about exe  
rcise : promote physical activity  
   
                                                    Last Documented On   
4 4:33PM ; [facility] offered active and suppo  
rtive listening and processed recent stressors. ~P  
  
discussed coping skills and supports to implement in managing increased anxiety 
such   
as tools learned previously in therapy. ~P discussed sleep hygiene strategies 
that   
can be implemented. ~P encouraged patient to follow-up with specialists as   
scheduled  
   
                                                    Last Documented On   
4 3:26PM ; [facility]  
   
                                                    Discussed nutritional needs   
teach healthy choices including fruits and   
vegetables  
   
                                                    Last Documented On   
4 4:51PM ; [facility]  
   
                                                    Patient education about a pr  
oper diet  
   
                                                    Last Documented On   
4 4:51PM ; [facility]  
   
                                                    Discussed concerns about exe  
rcise : promote physical activity  
   
                                                    Last Documented On   
4 4:51PM ; [facility]  
   
                                                    Referred Patient to a [facility]  
es Self-Management Program  
   
                                                    Last Documented On   
4 5:22PM ; [facility]o  
rtive listening and processed current stressors.   
~[facility]   
discussed coping skills and supports that can be implemented to manage increased
  
anxiety and stressors. P discussed potential of resuming counseling, even if 
for   
monthly visits to process ongoing stressors. ~[facility] encouraged patient to follow-
up on   
referrals as discussed with PCP  
   
                                                    Last Documented On   
4 10:08AM ; [facility]  
   
                                                    Discussed nutritional needs   
teach healthy choices including fruits and   
vegetables  
   
                                                    Last Documented On   
4 4:46PM ; [facility]  
   
                                                    Patient education about a pr  
oper diet  
   
                                                    Last Documented On   
4 4:46PM ; [facility]  
   
                                                    Discussed concerns about exe  
rcise : promote physical activity  
   
                                                    Last Documented On   
4 4:46PM ; [facility]  
   
                                                    Not requesting contraception  
   
                                                    Last Documented On   
4 4:46PM ; [facility] offered active and suppo  
rtive listening and processed current stressors   
related   
to getting medications. ~[facility] discussed coping skills and supports to implement 
in   
daily routine. ~[facility] discussed progress patient has felt they have made recently 
and   
encouraged continued follow-up with providers to address health  
   
                                                    Last Documented On   
4 5:16PM ; [facility]  
   
                                                    Discussed nutritional needs   
teach healthy choices including fruits and   
vegetables  
   
                                                    Last Documented On   
4 7:14PM ; [facility]  
   
                                                    Patient education about a pr  
oper diet  
   
                                                    Last Documented On   
4 7:14PM ; [facility]  
   
                                                    Discussed concerns about exe  
rcise : promote physical activity  
   
                                                    Last Documented On   
4 7:14PM ; [facility]  
   
                                                    Not requesting contraception  
   
                                                    Last Documented On   
4 7:14PM ; [facility]  
   
                                                    Discussed nutritional needs   
teach healthy choices including fruits and   
vegetables  
   
                                                    Last Documented On   
3 5:15PM ; [facility]  
   
                                                    Patient education about a pr  
oper diet  
   
                                                    Last Documented On   
3 5:15PM ; [facility]  
   
                                                    Discussed concerns about exe  
rcise : promote physical activity  
   
                                                    Last Documented On   
3 5:15PM ; [facility]  
   
                                                    Discussed nutritional needs   
teach healthy choices including fruits and   
vegetables  
   
                                                    Last Documented On 10/21/202  
2 1:42PM ; [facility]  
   
                                                    Patient education about a pr  
oper diet  
   
                                                    Last Documented On 10/21/202  
2 1:42PM ; [facility]  
   
                                                    Discussed concerns about exe  
rcise : promote physical activity  
   
                                                    Last Documented On 10/21/202  
2 1:42PM ; [facility]  
 and supportive feedback; normalized emotions and   
feelings, also provided pt time to process any current stressors. ~Promoted and   
encouraged follow-through with scheduling psychiatric services  
   
                                                    Last Documented On   
2 3:21PM ; [facility] provided supportive, empa  
thic listening and reflective feedback. ~Explored,   
encouraged, and supported the pt to discuss current sx/mood, assess risk for   
harm/need, coping mechanisms, support network and safety planning. ~Supported 
pt's   
plan to f/up with Dr. Alonso, as planned at [place], [place]. ~Encouraged 
pt to   
use safety plan, if needed to ensure she remains safe  
   
                                                    Last Documented On   
2 2:27PM ; [facility]  
   
                                                    Discussed nutritional needs   
teach healthy choices including fruits and   
vegetables  
   
                                                    Last Documented On   
2 3:20PM ; [facility]  
   
                                                    Patient education about a pr  
oper diet  
   
                                                    Last Documented On   
2 3:20PM ; [facility]  
   
                                                    Discussed concerns about exe  
rcise : promote physical activity ~ ~Will restart   
trazodone and prazosin ~ ~Patient is planning to have brother stay with her for 
a few   
days for emotional support ~ ~Follow up with PCP at next scheduled visit ~ ~Call
  
psychiatrist office to schedule appt ~ ~Call counselor  
   
                                                    Last Documented On   
2 9:35AM ; [facility]  
   
                                                    Provided supportive listenin  
g and empathic feedback; encouraged, explored, and   
supported the pt as she processed current symptoms, Issues, and concerns. 
~Discussed   
past tx and explored current needs/options. Acknowledged and validated pt's 
thoughts   
and emotions. ~Explored coping mechanisms and support network; utilized 
opportunity   
for safety planning; promoted seeking positive support and seeking help, as 
needed  
   
                                                    Last Documented On   
2 3:28PM ; [facility] provided active listenin  
g, support and helped pt process though current   
symptoms   
and stressor(s). Discussed and explored past effectiveness of medication; 
identified   
objectives and future goals; promoted use of healthy coping mechanisms, and 
self-care   
practices  
   
                                                    Last Documented On   
2 3:19PM ; [facility]  
   
                                                    Reviewed side effects and Ri  
sks/Benefits analysis  
   
                                                    Last Documented On   
2 3:19PM ; [facility]  
   
                                                    Discussed nutritional needs   
teach healthy choices including fruits and   
vegetables  
   
                                                    Last Documented On   
2 3:15PM ; [facility]  
   
                                                    Patient education about a pr  
oper diet  
   
                                                    Last Documented On   
2 3:15PM ; [facility]  
   
                                                    Discussed concerns about exe  
rcise : promote physical activity  
   
                                                    Last Documented On   
2 3:15PM ; [facility] introduced pt to [facility] in  
tegrated model of care ~[facility] offered active listening  
and   
supportive feedback; normalized emotions and feelings, also provided pt time to   
process any current stressors ~[facility] discussed potential benefits of counseling 
and   
supported re-engaging, as needed. ~[facility] encouraged pt to continue to make time to
  
implement self-care regimen and use coping methods, as needed  
   
                                                    Last Documented On   
2 4:07PM ; [facility]  
   
                                                    Discussed nutritional needs   
teach healthy choices including fruits and   
vegetables  
   
                                                    Last Documented On   
2 3:15PM ; [facility]  
   
                                                    Patient education about a pr  
oper diet  
   
                                                    Last Documented On   
2 3:15PM ; [facility]  
   
                                                    Inquiry and counseling about  
 medication administration and compliance  
   
                                                    Last Documented On   
2 7:37PM ; [facility]  
   
                                                    Discussed concerns about exe  
rcise : promote physical activity  
   
                                                    Last Documented On   
2 3:15PM ; [facility]  
   
                                                    Patient goals discussed  
   
                                                    Last Documented On   
2 7:37PM ; [facility]  
   
                                                    Ansewred pt's questions re B  
orderlline Personality D/O and Bipolar D/O raised by  
  
psychiatrist at [facility]. ~Validated and normalized patient?s feelings while   
assisting to process recent events  
   
                                                    Last Documented On   
1 1:33AM ; [facility]  
   
                                                    Discussed nutritional needs   
teach healthy choices including fruits and   
vegetables  
   
                                                    Last Documented On   
1 2:04PM ; [facility]  
   
                                                    Patient education about a pr  
oper diet  
   
                                                    Last Documented On   
1 2:04PM ; [facility]  
   
                                                    Discussed concerns about exe  
rcise : promote physical activity  
   
                                                    Last Documented On   
1 2:04PM ; [facility] provided active listenin  
g, support and helped patient process through   
current   
symptoms and stressors with ongoing mental health concerns and medication 
changes.   
~P discussed coping skills and supports that patient is implementing.  
discussed   
implementing coping skills as discussed with counseling and attending weekly   
appointments as scheduled with counselor. Patient was encouraged to continue 
writing   
down concerns with medications and discuss with providers. ~[facility] reminded patient
of   
crisis resources should they be needed. Patient reports having crisis resources 
and   
could return to ER  
   
                                                    Last Documented On   
1 10:17AM ; [facility]  
   
                                                    Patient education about a pr  
oper diet  
   
                                                    Last Documented On   
1 5:17PM ; [facility]  
   
                                                    Patient education about meal  
 planning  
   
                                                    Last Documented On   
1 5:17PM ; [facility]  
   
                                                    Education about changing eat  
ing habits  
   
                                                    Last Documented On   
1 5:17PM ; [facility]  
   
                                                    Patient education about high  
 fiber diet  
   
                                                    Last Documented On   
1 5:17PM ; [facility]  
   
                                                    Patient education about low   
fat diet  
   
                                                    Last Documented On   
1 5:17PM ; [facility]  
   
                                                    Patient education about low   
cholesterol diet  
   
                                                    Last Documented On   
1 5:17PM ; [facility]  
   
                                                    Patient education about low   
carbohydrate diet  
   
                                                    Last Documented On   
1 5:17PM ; [facility]  
   
                                                    Patient education about high  
 protein diet  
   
                                                    Last Documented On   
1 5:17PM ; [facility] offered active and suppo  
rtive listening, normalized emotions and feelings,   
and   
processed current stressors. ~[facility] discussed resources for finding a counselor 
and   
provided list of local resources. ~[facility] discussed patients coping skills and 
supports   
and encouraged patient to continue to implement. [facility] reminded patient of crisis
  
resources should they be needed  
   
                                                    Last Documented On   
1 7:15PM ; [facility]  
   
                                                    Discussed nutritional needs   
teach healthy choices including fruits and   
vegetables  
   
                                                    Last Documented On   
1 11:38AM ; [facility]  
   
                                                    Patient education about a pr  
oper diet  
   
                                                    Last Documented On   
1 11:38AM ; [facility]  
   
                                                    Patient education about a pr  
oper diet  
   
                                                    Last Documented On   
1 12:19PM ; [facility]  
   
                                                    Patient education about meal  
 planning  
   
                                                    Last Documented On   
1 12:19PM ; [facility]  
   
                                                    Education about changing eat  
ing habits  
   
                                                    Last Documented On   
1 12:19PM ; [facility]  
   
                                                    Patient education about high  
 fiber diet  
   
                                                    Last Documented On   
1 12:19PM ; [facility]  
   
                                                    Patient education about low   
fat diet  
   
                                                    Last Documented On   
1 12:19PM ; [facility]  
   
                                                    Patient education about low   
cholesterol diet  
   
                                                    Last Documented On   
1 12:19PM ; [facility]  
   
                                                    Patient education about low   
carbohydrate diet  
   
                                                    Last Documented On   
1 12:19PM ; [facility]  
   
                                                    Patient education about high  
 protein diet  
   
                                                    Last Documented On   
1 12:19PM ; [facility]  
   
                                                    Discussed concerns about exe  
rcise : promote physical activity  
   
                                                    Last Documented On   
1 11:38AM ; [facility] provided active listenin  
g, support and helped patient process through   
current   
symptoms and stressors related to family conflict. [facility] discussed coping skills 
and   
supports with patient that can be implemented and reminded patient of ways to 
access   
additional resources. [facility] discussed crisis resources and plan. Patient has 
crisis   
resources still available should they be needed  
   
                                                    Last Documented On 06/10/202  
1 7:04PM ; [facility]  
   
                                                    Discussed nutritional needs   
teach healthy choices including fruits and   
vegetables  
   
                                                    Last Documented On 06/10/202  
1 3:57PM ; [facility]  
   
                                                    Patient education about a pr  
oper diet  
   
                                                    Last Documented On 06/10/202  
1 3:57PM ; [facility]  
   
                                                    Discussed concerns about exe  
rcise : promote physical activity  
   
                                                    Last Documented On 06/10/202  
1 3:57PM ; [facility] introduced patient to HP  
WO integrated model of care. [facility] and PCP reassured   
patient of not sharing information with anyone unless she has signed a release 
for us   
to do so. ~P provided active listening, support and helped patient process 
through   
current symptoms and stressors. ~P discussed establishing counseling and   
psychiatry. [facility] discussed EMDR therapy and ways to find provider who does this 
type   
of therapy. ~P discussed crisis resources should mood worsen, [facility] provided 
text   
hotline number for crisis. P reviewed crisis plan with patient and patient is 
able   
to contact positive supports and family when feeling down  
   
                                                    Last Documented On   
1 11:46AM ; [facility]  
   
                                                    Discussed nutritional needs   
teach healthy choices including fruits and   
vegetables  
   
                                                    Last Documented On   
1 2:11PM ; [facility]  
   
                                                    Patient education about a pr  
oper diet  
   
                                                    Last Documented On   
1 2:11PM ; [facility]  
   
                                                    Discussed concerns about exe  
rcise : promote physical activity  
   
                                                    Last Documented On   
1 2:11PM ; [facility]  
Work Phone: [phone]  
  
Includes: Instructions for all patient encounters  
  
  
  
                                                    Education and Decision Aids   
were provided during visit for:  
   
                                                    ~*[facility] offered active and sup  
portive listening, normalized emotions and feelings,  
and   
processed ~current stressors. ~*Discussed engageing in counseling and looking 
into   
EMDR to address PTSD and increased nightmares  
   
                                                    Last Documented On   
4 4:14PM ; [facility]  
   
                                                    Discussed nutritional needs   
teach healthy choices including fruits and   
vegetables  
   
                                                    Last Documented On   
4 4:33PM ; [facility]  
   
                                                    Patient education about a pr  
oper diet  
   
                                                    Last Documented On   
4 4:33PM ; [facility]  
   
                                                    Discussed concerns about exe  
rcise : promote physical activity  
   
                                                    Last Documented On   
4 4:33PM ; [facility] offered active and suppo  
rtive listening and processed recent stressors. ~P  
  
discussed coping skills and supports to implement in managing increased anxiety 
such   
as tools learned previously in therapy. ~P discussed sleep hygiene strategies 
that   
can be implemented. ~[facility] encouraged patient to follow-up with specialists as   
scheduled  
   
                                                    Last Documented On   
4 3:26PM ; [facility]  
   
                                                    Discussed nutritional needs   
teach healthy choices including fruits and   
vegetables  
   
                                                    Last Documented On   
4 4:51PM ; [facility]  
   
                                                    Patient education about a pr  
oper diet  
   
                                                    Last Documented On   
4 4:51PM ; [facility]  
   
                                                    Discussed concerns about exe  
rcise : promote physical activity  
   
                                                    Last Documented On   
4 4:51PM ; [facility]  
   
                                                    Referred Patient to a [facility]  
es Self-Management Program  
   
                                                    Last Documented On   
4 5:22PM ; [facility] offered active and suppo  
rtive listening and processed current stressors.   
~P   
discussed coping skills and supports that can be implemented to manage increased
  
anxiety and stressors. P discussed potential of resuming counseling, even if 
for   
monthly visits to process ongoing stressors. ~[facility] encouraged patient to follow-
up on   
referrals as discussed with PCP  
   
                                                    Last Documented On   
4 10:08AM ; [facility]  
   
                                                    Discussed nutritional needs   
teach healthy choices including fruits and   
vegetables  
   
                                                    Last Documented On   
4 4:46PM ; [facility]  
   
                                                    Patient education about a pr  
oper diet  
   
                                                    Last Documented On   
4 4:46PM ; [facility]  
   
                                                    Discussed concerns about exe  
rcise : promote physical activity  
   
                                                    Last Documented On   
4 4:46PM ; [facility]  
   
                                                    Not requesting contraception  
   
                                                    Last Documented On   
4 4:46PM ; [facility] offered active and suppo  
rtive listening and processed current stressors   
related   
to getting medications. ~[facility] discussed coping skills and supports to implement 
in   
daily routine. ~[facility] discussed progress patient has felt they have made recently 
and   
encouraged continued follow-up with providers to address health  
   
                                                    Last Documented On   
4 5:16PM ; [facility]  
   
                                                    Discussed nutritional needs   
teach healthy choices including fruits and   
vegetables  
   
                                                    Last Documented On   
4 7:14PM ; [facility]  
   
                                                    Patient education about a pr  
oper diet  
   
                                                    Last Documented On   
4 7:14PM ; [facility]  
   
                                                    Discussed concerns about exe  
rcise : promote physical activity  
   
                                                    Last Documented On   
4 7:14PM ; [facility]  
   
                                                    Not requesting contraception  
   
                                                    Last Documented On   
4 7:14PM ; [facility]  
   
                                                    Discussed nutritional needs   
teach healthy choices including fruits and   
vegetables  
   
                                                    Last Documented On   
3 5:15PM ; [facility]  
   
                                                    Patient education about a pr  
oper diet  
   
                                                    Last Documented On   
3 5:15PM ; [facility]  
   
                                                    Discussed concerns about exe  
rcise : promote physical activity  
   
                                                    Last Documented On   
3 5:15PM ; [facility]  
   
                                                    Discussed nutritional needs   
teach healthy choices including fruits and   
vegetables  
   
                                                    Last Documented On 10/21/202  
2 1:42PM ; [facility]  
   
                                                    Patient education about a pr  
oper diet  
   
                                                    Last Documented On 10/21/202  
2 1:42PM ; [facility]  
   
                                                    Discussed concerns about exe  
rcise : promote physical activity  
   
                                                    Last Documented On 10/21/202  
2 1:42PM ; [facility]  
 and supportive feedback; normalized emotions and   
feelings, also provided pt time to process any current stressors. ~Promoted and   
encouraged follow-through with scheduling psychiatric services  
   
                                                    Last Documented On   
2 3:21PM ; [facility] provided supportive, empa  
thic listening and reflective feedback. ~Explored,   
encouraged, and supported the pt to discuss current sx/mood, assess risk for   
harm/need, coping mechanisms, support network and safety planning. ~Supported 
pt's   
plan to f/up with Dr. Alonso, as planned at [place], [place]. ~Encouraged 
pt to   
use safety plan, if needed to ensure she remains safe  
   
                                                    Last Documented On   
2 2:27PM ; [facility]  
   
                                                    Discussed nutritional needs   
teach healthy choices including fruits and   
vegetables  
   
                                                    Last Documented On   
2 3:20PM ; [facility]  
   
                                                    Patient education about a pr  
oper diet  
   
                                                    Last Documented On   
2 3:20PM ; [facility]  
   
                                                    Discussed concerns about exe  
rcise : promote physical activity ~ ~Will restart   
trazodone and prazosin ~ ~Patient is planning to have brother stay with her for 
a few   
days for emotional support ~ ~Follow up with PCP at next scheduled visit ~ ~Call
  
psychiatrist office to schedule appt ~ ~Call counselor  
   
                                                    Last Documented On   
2 9:35AM ; [facility]  
   
                                                    Provided supportive listenin  
g and empathic feedback; encouraged, explored, and   
supported the pt as she processed current symptoms, Issues, and concerns. 
~Discussed   
past tx and explored current needs/options. Acknowledged and validated pt's 
thoughts   
and emotions. ~Explored coping mechanisms and support network; utilized 
opportunity   
for safety planning; promoted seeking positive support and seeking help, as 
needed  
   
                                                    Last Documented On   
2 3:28PM ; [facility] provided active listenin  
g, support and helped pt process though current   
symptoms   
and stressor(s). Discussed and explored past effectiveness of medication; 
identified   
objectives and future goals; promoted use of healthy coping mechanisms, and 
self-care   
practices  
   
                                                    Last Documented On   
2 3:19PM ; [facility]  
   
                                                    Reviewed side effects and Ri  
sks/Benefits analysis  
   
                                                    Last Documented On   
2 3:19PM ; [facility]  
   
                                                    Discussed nutritional needs   
teach healthy choices including fruits and   
vegetables  
   
                                                    Last Documented On   
2 3:15PM ; [facility]  
   
                                                    Patient education about a pr  
oper diet  
   
                                                    Last Documented On   
2 3:15PM ; [facility]  
   
                                                    Discussed concerns about exe  
rcise : promote physical activity  
   
                                                    Last Documented On   
2 3:15PM ; [facility] introduced pt to [facility]WO in  
tegrated model of care ~[facility] offered active listening  
and   
supportive feedback; normalized emotions and feelings, also provided pt time to   
process any current stressors ~[facility] discussed potential benefits of counseling 
and   
supported re-engaging, as needed. ~[facility] encouraged pt to continue to make time to
  
implement self-care regimen and use coping methods, as needed  
   
                                                    Last Documented On   
2 4:07PM ; [facility]  
   
                                                    Discussed nutritional needs   
teach healthy choices including fruits and   
vegetables  
   
                                                    Last Documented On   
2 3:15PM ; [facility]  
   
                                                    Patient education about a pr  
oper diet  
   
                                                    Last Documented On   
2 3:15PM ; [facility]  
   
                                                    Inquiry and counseling about  
 medication administration and compliance  
   
                                                    Last Documented On   
2 7:37PM ; [facility]  
   
                                                    Discussed concerns about exe  
rcise : promote physical activity  
   
                                                    Last Documented On   
2 3:15PM ; [facility]  
   
                                                    Patient goals discussed  
   
                                                    Last Documented On   
2 7:37PM ; [facility]  
   
                                                    Ansewred pt's questions re B  
orderlline Personality D/O and Bipolar D/O raised by  
  
psychiatrist at [place]. ~Validated and normalized patient?s feelings while   
assisting to process recent events  
   
                                                    Last Documented On   
1 1:33AM ; [facility]  
   
                                                    Discussed nutritional needs   
teach healthy choices including fruits and   
vegetables  
   
                                                    Last Documented On   
1 2:04PM ; [facility]  
   
                                                    Patient education about a pr  
oper diet  
   
                                                    Last Documented On   
1 2:04PM ; [facility]  
   
                                                    Discussed concerns about exe  
rcise : promote physical activity  
   
                                                    Last Documented On   
1 2:04PM ; [facility] provided active listenin  
g, support and helped patient process through   
current   
symptoms and stressors with ongoing mental health concerns and medication 
changes.   
~[facility] discussed coping skills and supports that patient is implementing.  
discussed   
implementing coping skills as discussed with counseling and attending weekly   
appointments as scheduled with counselor. Patient was encouraged to continue 
writing   
down concerns with medications and discuss with providers. ~P reminded patient
of   
crisis resources should they be needed. Patient reports having crisis resources 
and   
could return to ER  
   
                                                    Last Documented On   
1 10:17AM ; [facility]  
   
                                                    Patient education about a pr  
oper diet  
   
                                                    Last Documented On   
1 5:17PM ; [facility]  
   
                                                    Patient education about meal  
 planning  
   
                                                    Last Documented On   
1 5:17PM ; [facility]  
   
                                                    Education about changing eat  
ing habits  
   
                                                    Last Documented On   
1 5:17PM ; [facility]  
   
                                                    Patient education about high  
 fiber diet  
   
                                                    Last Documented On   
1 5:17PM ; [facility]  
   
                                                    Patient education about low   
fat diet  
   
                                                    Last Documented On   
1 5:17PM ; [facility]  
   
                                                    Patient education about low   
cholesterol diet  
   
                                                    Last Documented On   
1 5:17PM ; [facility]  
   
                                                    Patient education about low   
carbohydrate diet  
   
                                                    Last Documented On   
1 5:17PM ; [facility]  
   
                                                    Patient education about high  
 protein diet  
   
                                                    Last Documented On   
1 5:17PM ; [facility] offered active and suppo  
rtive listening, normalized emotions and feelings,   
and   
processed current stressors. ~[facility] discussed resources for finding a counselor 
and   
provided list of local resources. ~P discussed patients coping skills and 
supports   
and encouraged patient to continue to implement. ~[facility] reminded patient of crisis
  
resources should they be needed  
   
                                                    Last Documented On   
1 7:15PM ; [facility]  
   
                                                    Discussed nutritional needs   
teach healthy choices including fruits and   
vegetables  
   
                                                    Last Documented On   
1 11:38AM ; [facility]  
   
                                                    Patient education about a pr  
oper diet  
   
                                                    Last Documented On   
1 11:38AM ; [facility]  
   
                                                    Patient education about a pr  
oper diet  
   
                                                    Last Documented On   
1 12:19PM ; [facility]  
   
                                                    Patient education about meal  
 planning  
   
                                                    Last Documented On   
1 12:19PM ; [facility]  
   
                                                    Education about changing eat  
ing habits  
   
                                                    Last Documented On   
1 12:19PM ; [facility]  
   
                                                    Patient education about high  
 fiber diet  
   
                                                    Last Documented On   
1 12:19PM ; [facility]  
   
                                                    Patient education about low   
fat diet  
   
                                                    Last Documented On   
1 12:19PM ; [facility]  
   
                                                    Patient education about low   
cholesterol diet  
   
                                                    Last Documented On   
1 12:19PM ; [facility]  
   
                                                    Patient education about low   
carbohydrate diet  
   
                                                    Last Documented On   
1 12:19PM ; [facility]  
   
                                                    Patient education about high  
 protein diet  
   
                                                    Last Documented On   
1 12:19PM ; [facility]  
   
                                                    Discussed concerns about exe  
rcise : promote physical activity  
   
                                                    Last Documented On   
1 11:38AM ; [facility] provided active listenin  
g, support and helped patient process through   
current   
symptoms and stressors related to family conflict. ~[facility] discussed coping skills 
and   
supports with patient that can be implemented and reminded patient of ways to 
access   
additional resources. ~[facility] discussed crisis resources and plan. Patient has 
crisis   
resources still available should they be needed  
   
                                                    Last Documented On 06/10/202  
1 7:04PM ; [facility]  
   
                                                    Discussed nutritional needs   
teach healthy choices including fruits and   
vegetables  
   
                                                    Last Documented On 06/10/202  
1 3:57PM ; [facility]  
   
                                                    Patient education about a pr  
oper diet  
   
                                                    Last Documented On 06/10/202  
1 3:57PM ; [facility]  
   
                                                    Discussed concerns about exe  
rcise : promote physical activity  
   
                                                    Last Documented On 06/10/202  
1 3:57PM ; [facility]. [facility] and PCP reassured   
patient of not sharing information with anyone unless she has signed a release 
for us   
to do so. ~[facility] provided active listening, support and helped patient process 
through   
current symptoms and stressors. ~[facility] discussed establishing counseling and   
psychiatry. [facility] discussed EMDR therapy and ways to find provider who does this 
type   
of therapy. ~[facility] discussed crisis resources should mood worsen, [facility] provided 
text   
hotline number for crisis. [facility] reviewed crisis plan with patient and patient is 
able   
to contact positive supports and family when feeling down  
   
                                                    Last Documented On   
1 11:46AM ; [facility]  
   
                                                    Discussed nutritional needs   
teach healthy choices including fruits and   
vegetables  
   
                                                    Last Documented On   
1 2:11PM ; [facility]  
   
                                                    Patient education about a pr  
oper diet  
   
                                                    Last Documented On   
1 2:11PM ; [facility]  
   
                                                    Discussed concerns about exe  
rcise : promote physical activity  
   
                                                    Last Documented On   
1 2:11PM ; [facility]  
Work Phone: [phone]  
  
Includes: Instructions for all patient encounters  
  
  
  
                                                    Education and Decision Aids   
were provided during visit for:  
   
                                                    ~*[facility] offered active and sup  
portive listening, normalized emotions and feelings,  
and   
processed ~current stressors. ~*Discussed engageing in counseling and looking 
into   
EMDR to address PTSD and increased nightmares  
   
                                                    Last Documented On   
4 4:14PM ; [facility]  
   
                                                    Discussed nutritional needs   
teach healthy choices including fruits and   
vegetables  
   
                                                    Last Documented On   
4 4:33PM ; [facility]  
   
                                                    Patient education about a pr  
oper diet  
   
                                                    Last Documented On   
4 4:33PM ; [facility]  
   
                                                    Discussed concerns about exe  
rcise : promote physical activity  
   
                                                    Last Documented On   
4 4:33PM ; [facility] offered active and suppo  
rtive listening and processed recent stressors. ~[facility]  
  
discussed coping skills and supports to implement in managing increased anxiety 
such   
as tools learned previously in therapy. ~[facility] discussed sleep hygiene strategies 
that   
can be implemented. ~[facility] encouraged patient to follow-up with specialists as   
scheduled  
   
                                                    Last Documented On   
4 3:26PM ; [facility]  
   
                                                    Discussed nutritional needs   
teach healthy choices including fruits and   
vegetables  
   
                                                    Last Documented On   
4 4:51PM ; [facility]  
   
                                                    Patient education about a pr  
oper diet  
   
                                                    Last Documented On   
4 4:51PM ; [facility]  
   
                                                    Discussed concerns about exe  
rcise : promote physical activity  
   
                                                    Last Documented On   
4 4:51PM ; [facility]  
   
                                                    Referred Patient to a [facility]  
es Self-Management Program  
   
                                                    Last Documented On   
4 5:22PM ; [facility]  
rtive listening and processed current stressors.   
~P   
discussed coping skills and supports that can be implemented to manage increased
  
anxiety and stressors. P discussed potential of resuming counseling, even if 
for   
monthly visits to process ongoing stressors. ~[facility] encouraged patient to follow-
up on   
referrals as discussed with PCP  
   
                                                    Last Documented On   
4 10:08AM ; [facility]  
   
                                                    Discussed nutritional needs   
teach healthy choices including fruits and   
vegetables  
   
                                                    Last Documented On   
4 4:46PM ; [facility]  
   
                                                    Patient education about a pr  
oper diet  
   
                                                    Last Documented On   
4 4:46PM ; [facility]  
   
                                                    Discussed concerns about exe  
rcise : promote physical activity  
   
                                                    Last Documented On   
4 4:46PM ; [facility]  
   
                                                    Not requesting contraception  
   
                                                    Last Documented On   
4 4:46PM ; [facility] offered active and suppo  
rtive listening and processed current stressors   
related   
to getting medications. ~[facility] discussed coping skills and supports to implement 
in   
daily routine. ~[facility] discussed progress patient has felt they have made recently 
and   
encouraged continued follow-up with providers to address health  
   
                                                    Last Documented On   
4 5:16PM ; [facility]  
   
                                                    Discussed nutritional needs   
teach healthy choices including fruits and   
vegetables  
   
                                                    Last Documented On   
4 7:14PM ; [facility]  
   
                                                    Patient education about a pr  
oper diet  
   
                                                    Last Documented On   
4 7:14PM ; [facility]  
   
                                                    Discussed concerns about exe  
rcise : promote physical activity  
   
                                                    Last Documented On   
4 7:14PM ; [facility]  
   
                                                    Not requesting contraception  
   
                                                    Last Documented On   
4 7:14PM ; [facility]  
   
                                                    Discussed nutritional needs   
teach healthy choices including fruits and   
vegetables  
   
                                                    Last Documented On   
3 5:15PM ; [facility]  
   
                                                    Patient education about a pr  
oper diet  
   
                                                    Last Documented On   
3 5:15PM ; [facility]  
   
                                                    Discussed concerns about exe  
rcise : promote physical activity  
   
                                                    Last Documented On   
3 5:15PM ; [facility]  
   
                                                    Discussed nutritional needs   
teach healthy choices including fruits and   
vegetables  
   
                                                    Last Documented On 10/21/202  
2 1:42PM ; [facility]  
   
                                                    Patient education about a pr  
oper diet  
   
                                                    Last Documented On 10/21/202  
2 1:42PM ; [facility]  
   
                                                    Discussed concerns about exe  
rcise : promote physical activity  
   
                                                    Last Documented On 10/21/202  
2 1:42PM ; [facility] offered active listening  
 and supportive feedback; normalized emotions and   
feelings, also provided pt time to process any current stressors. ~Promoted and   
encouraged follow-through with scheduling psychiatric services  
   
                                                    Last Documented On   
2 3:21PM ; [facility] provided supportive, empa  
thic listening and reflective feedback. ~Explored,   
encouraged, and supported the pt to discuss current sx/mood, assess risk for   
harm/need, coping mechanisms, support network and safety planning. ~Supported 
pt's   
plan to f/up with Dr. Alonso, as planned at [place], [place]. ~Encouraged 
pt to   
use safety plan, if needed to ensure she remains safe  
   
                                                    Last Documented On   
2 2:27PM ; [facility]  
   
                                                    Discussed nutritional needs   
teach healthy choices including fruits and   
vegetables  
   
                                                    Last Documented On   
2 3:20PM ; [facility]  
   
                                                    Patient education about a pr  
oper diet  
   
                                                    Last Documented On   
2 3:20PM ; [facility]  
   
                                                    Discussed concerns about exe  
rcise : promote physical activity ~ ~Will restart   
trazodone and prazosin ~ ~Patient is planning to have brother stay with her for 
a few   
days for emotional support ~ ~Follow up with PCP at next scheduled visit ~ ~Call
  
psychiatrist office to schedule appt ~ ~Call counselor  
   
                                                    Last Documented On   
2 9:35AM ; [facility]  
   
                                                    Provided supportive listenin  
g and empathic feedback; encouraged, explored, and   
supported the pt as she processed current symptoms, Issues, and concerns. 
~Discussed   
past tx and explored current needs/options. Acknowledged and validated pt's 
thoughts   
and emotions. ~Explored coping mechanisms and support network; utilized 
opportunity   
for safety planning; promoted seeking positive support and seeking help, as 
needed  
   
                                                    Last Documented On   
2 3:28PM ; [facility] provided active listenin  
g, support and helped pt process though current   
symptoms   
and stressor(s). Discussed and explored past effectiveness of medication; 
identified   
objectives and future goals; promoted use of healthy coping mechanisms, and 
self-care   
practices  
   
                                                    Last Documented On   
2 3:19PM ; [facility]  
   
                                                    Reviewed side effects and Ri  
sks/Benefits analysis  
   
                                                    Last Documented On   
2 3:19PM ; [facility]  
   
                                                    Discussed nutritional needs   
teach healthy choices including fruits and   
vegetables  
   
                                                    Last Documented On   
2 3:15PM ; [facility]  
   
                                                    Patient education about a pr  
oper diet  
   
                                                    Last Documented On   
2 3:15PM ; [facility]  
   
                                                    Discussed concerns about exe  
rcise : promote physical activity  
   
                                                    Last Documented On   
2 3:15PM ; [facility] introduced pt to [facility] in  
tegrated model of care ~[facility] offered active listening  
and   
supportive feedback; normalized emotions and feelings, also provided pt time to   
process any current stressors ~[facility] discussed potential benefits of counseling 
and   
supported re-engaging, as needed. ~[facility] encouraged pt to continue to make time to
  
implement self-care regimen and use coping methods, as needed  
   
                                                    Last Documented On   
2 4:07PM ; [facility]  
   
                                                    Discussed nutritional needs   
teach healthy choices including fruits and   
vegetables  
   
                                                    Last Documented On   
2 3:15PM ; [facility]  
   
                                                    Patient education about a pr  
oper diet  
   
                                                    Last Documented On   
2 3:15PM ; [facility]  
   
                                                    Inquiry and counseling about  
 medication administration and compliance  
   
                                                    Last Documented On   
2 7:37PM ; [facility]  
   
                                                    Discussed concerns about exe  
rcise : promote physical activity  
   
                                                    Last Documented On   
2 3:15PM ; [facility]  
   
                                                    Patient goals discussed  
   
                                                    Last Documented On   
2 7:37PM ; [facility]  
   
                                                    Ansewred pt's questions re B  
orderlline Personality D/O and Bipolar D/O raised by  
  
psychiatrist at [place]. ~Validated and normalized patient?s feelings while   
assisting to process recent events  
   
                                                    Last Documented On   
1 1:33AM ; [facility]  
   
                                                    Discussed nutritional needs   
teach healthy choices including fruits and   
vegetables  
   
                                                    Last Documented On   
1 2:04PM ; [facility]  
   
                                                    Patient education about a pr  
oper diet  
   
                                                    Last Documented On   
1 2:04PM ; [facility]  
   
                                                    Discussed concerns about exe  
rcise : promote physical activity  
   
                                                    Last Documented On   
1 2:04PM ; [facility] provided active listenin  
g, support and helped patient process through   
current   
symptoms and stressors with ongoing mental health concerns and medication 
changes.   
~[facility] discussed coping skills and supports that patient is implementing.  
discussed   
implementing coping skills as discussed with counseling and attending weekly   
appointments as scheduled with counselor. Patient was encouraged to continue 
writing   
down concerns with medications and discuss with providers. ~P reminded patient
of   
crisis resources should they be needed. Patient reports having crisis resources 
and   
could return to ER  
   
                                                    Last Documented On   
1 10:17AM ; [facility]  
   
                                                    Patient education about a pr  
oper diet  
   
                                                    Last Documented On   
1 5:17PM ; [facility]  
   
                                                    Patient education about meal  
 planning  
   
                                                    Last Documented On   
1 5:17PM ; [facility]  
   
                                                    Education about changing eat  
ing habits  
   
                                                    Last Documented On   
1 5:17PM ; [facility]  
   
                                                    Patient education about high  
 fiber diet  
   
                                                    Last Documented On   
1 5:17PM ; [facility]  
   
                                                    Patient education about low   
fat diet  
   
                                                    Last Documented On   
1 5:17PM ; [facility]  
   
                                                    Patient education about low   
cholesterol diet  
   
                                                    Last Documented On   
1 5:17PM ; [facility]  
   
                                                    Patient education about low   
carbohydrate diet  
   
                                                    Last Documented On   
1 5:17PM ; [facility]  
   
                                                    Patient education about high  
 protein diet  
   
                                                    Last Documented On   
1 5:17PM ; [facility] offered active and suppo  
rtive listening, normalized emotions and feelings,   
and   
processed current stressors. ~[facility] discussed resources for finding a counselor 
and   
provided list of local resources. ~P discussed patients coping skills and 
supports   
and encouraged patient to continue to implement. ~[facility] reminded patient of crisis
  
resources should they be needed  
   
                                                    Last Documented On   
1 7:15PM ; [facility]  
   
                                                    Discussed nutritional needs   
teach healthy choices including fruits and   
vegetables  
   
                                                    Last Documented On   
1 11:38AM ; [facility]  
   
                                                    Patient education about a pr  
oper diet  
   
                                                    Last Documented On   
1 11:38AM ; [facility]  
   
                                                    Patient education about a pr  
oper diet  
   
                                                    Last Documented On   
1 12:19PM ; [facility]  
   
                                                    Patient education about meal  
 planning  
   
                                                    Last Documented On   
1 12:19PM ; [facility]  
   
                                                    Education about changing eat  
ing habits  
   
                                                    Last Documented On   
1 12:19PM ; [facility]  
   
                                                    Patient education about high  
 fiber diet  
   
                                                    Last Documented On   
1 12:19PM ; [facility]  
   
                                                    Patient education about low   
fat diet  
   
                                                    Last Documented On  12:19PM ; [facility]  
   
                                                    Patient education about low   
cholesterol diet  
   
                                                    Last Documented On   
1 12:19PM ; [facility]  
   
                                                    Patient education about low   
carbohydrate diet  
   
                                                    Last Documented On   
1 12:19PM ; [facility]  
   
                                                    Patient education about high  
 protein diet  
   
                                                    Last Documented On   
1 12:19PM ; [facility]  
   
                                                    Discussed concerns about exe  
rcise : promote physical activity  
   
                                                    Last Documented On   
1 11:38AM [facility]  
[facility], support and helped patient process through   
current   
symptoms and stressors related to family conflict. ~P discussed coping skills 
and   
supports with patient that can be implemented and reminded patient of ways to 
access   
additional resources. ~P discussed crisis resources and plan. Patient has 
crisis   
resources still available should they be needed  
   
                                                    Last Documented On 06/10/202  
1 7:04PM ; [facility]  
   
                                                    Discussed nutritional needs   
teach healthy choices including fruits and   
vegetables  
   
                                                    Last Documented On 06/10/202  
1 3:57PM ; [facility]  
   
                                                    Patient education about a pr  
oper diet  
   
                                                    Last Documented On 06/10/202  
1 3:57PM ; [facility]  
   
                                                    Discussed concerns about exe  
rcise : promote physical activity  
   
                                                    Last Documented On 06/10/202  
1 3:57PM ; [facility]. [facility] and PCP reassured   
patient of not sharing information with anyone unless she has signed a release 
for us   
to do so. ~P provided active listening, support and helped patient process 
through   
current symptoms and stressors. ~[facility] discussed establishing counseling and   
psychiatry. P discussed EMDR therapy and ways to find provider who does this 
type   
of therapy. ~[facility] discussed crisis resources should mood worsen, [facility] provided 
text   
hotline number for crisis. [facility] reviewed crisis plan with patient and patient is 
able   
to contact positive supports and family when feeling down  
   
                                                    Last Documented On   
1 11:46AM ; [facility]  
   
                                                    Discussed nutritional needs   
teach healthy choices including fruits and   
vegetables  
   
                                                    Last Documented On   
1 2:11PM ; [facility]  
   
                                                    Patient education about a pr  
oper diet  
   
                                                    Last Documented On   
1 2:11PM ; [facility]  
   
                                                    Discussed concerns about exe  
rcise : promote physical activity  
   
                                                    Last Documented On   
1 2:11PM ; [facility]  
Work Phone: [phone] problem outcome Narrative  
  
Includes: Evaluations & Outcomes for active Goals  
  
No [facility]ed[facility]  
Work Phone: [phone] note*   
  
Progress note  
  
  
  
                                Date            Encounter       Last Documented   
by  
   
                                10/07/2024      Chart Update    Last documented   
on 10/07/2024; 12:07 PM, Doreen Cabrera CNP;   
[facility]  
  
  
  
                                                      
  
  
** Active Problems & Conditions **  
- F90.9 - Attention-deficit Hyperactivity Disorder  
- F31.31 - Bipolar I Disorder, Most Recent Episode, Depressed Mild  
- E11.9 - Diabetes Mellitus Type 2 Without Complication  
- N94.6 - Dysmenorrhea  
- F43.10 - Post-traumatic Stress Disorder  
  
** Current Medication **  
- Alcohol Pads 70% use to cleanse skin prior to checking blood sugars, 90 days, 
3   
refills  
- ARIPiprazole 5 MG Oral Tablet take 1 tablet by mouth once daily, 30 days, 5 
refills  
- Atorvastatin Calcium 20 MG Oral Tablet take 1 tablet by mouth once daily at   
bedtime, 30 days, 5 refills  
- Blood Glucose System Sriram Kit use to check sugars once daily (use what is 
covered),   
30 days, 0 refills  
- busPIRone HCl 10 MG Oral Tablet take 1 tablet by mouth twice daily (d/c 5 mg 
rx),   
30 days, 5 refills  
- CareSens Lancets Miscellaneous use to check sugars once daily (dispense what 
is   
covered), 90 days, 3 refills  
- Colace 100 MG Oral Capsule take 1 capsule by mouth once daily at bedtime as 
needed   
for constipation, 30 days, 5 refills  
- [employer] Iron 325 (65 Fe) MG Oral Tablet take 1 tablet by mouth once daily, 30 
days, 5   
refills  
- Intuniv 1 MG Oral Tablet Extended Release 24 Hour take 1 tablet by mouth once 
daily   
at bedtime, 30 days, 5 refills  
- Januvia 50 MG Oral Tablet take 1 tablet by mouth once daily, 30 days, 5 
refills  
- lamoTRIgine 100 MG Oral Tablet take 1 tablet by mouth once daily, 30 days, 5   
refills  
- Lisinopril 5 MG Oral Tablet take 1 tablet by mouth once daily, 30 days, 5 
refills  
- MiraLax 17 GM/SCOOP Oral Powder mix 1 scoop with 8 ounces once daily, 30 days,
2   
refills  
- Narcan 4 MG/0.1ML Nasal Liquid spray in nostril for symptoms of overdose , may
  
repeat in 2 minutes if symptoms persist, 1 days, 0 refills  
- OneTouch Ultra In Vitro Strip USE ONE TEST STRIP TO CHECK BLOOD SUGARS ONCE 
DAILY,   
90 days, 3 refills  
- Prazosin HCl 1 MG Oral Capsule take 1 capsule by mouth twice daily, 30 days, 5
  
refills  
- SEROquel 50 MG Oral Tablet take 1 tablet by mouth once daily at bedtime (d/c   
trazodone), 30 days, 1 refills  
- Sertraline HCl 50 MG Oral Tablet take 1 tablet by mouth once daily, 30 days, 5
  
refills  
- Slynd 4 MG Oral Tablet take 1 tablet by mouth at the same time everyday to 
help   
regulate your period, 28 days, 2 refills  
  
** Past Medical/Surgical History **  
Reported:  
No Safety Measures. Has sex without a condom.  
Medical: No previous hospitalizations. Chronic illness and Sexually transmitted   
infection Partners sexually transmitted infection status known.  
Immunization History: Recent immunization for flu.  
Exposure: Exposure to COVID-19.  
Pregnancy: Previously pregnant 1 time(s) and para having 0 live birth(s). Not   
planning a pregnancy in the next year.  
Legal Documents: Consent form on file for procedure.  
Diagnoses:  
Polycystic Ovarian Syndrome (PCOS).  
Migraine headache.  
Psychiatric disorders biopolar disorder  
Anxiety disorder  
POTS.  
Procedural:  
- Insertion of ear pressure equalization tubes in both ears  
Surgical:  
- Tonsillectomy  
- Tonsillectomy with adenoidectomy  
  
** Allergies **  
- Abilify Reaction: groggy  
- Augmentin Reaction: Shock  
- Metformin Reaction: Nausea, Vomiting  
- NO KNOWN ENVIRONMENTAL ALLERGIES  
- NO KNOWN FOOD ALLERGIES  
- Sertraline Reaction: slow/groggy  
- Trazodone Hydrochloride Reaction: Panic attack  
  
** Family History **  
Paternal:  
Systemic hypertension  
Oncologic disorder  
Maternal:  
Systemic hypertension  
Epilepsy and recurrent seizures  
Psychiatric disorders  
Oncologic disorder  
Fraternal:  
Psychiatric disorders  
  
** Assessment **  
- D50.9 - Iron deficiency anemia, unspecified  
  
** Plan **  
StartCited- Iron deficiency anemia, unspecified  
Lab: Iron and TIBC  
Lab: CBC With Differential/Platelet  
EndCited  
** Care Team **  
- Doreen Cabrera CNP  
  
  
[facility]No Reason for 
Referral [facility]ed[facility]  
Work Phone: [phone] of systems Narrative - Reported  
  
Review of Systems not supported for this document type  
  
No Review of Systems [facility]edBridge[facility]  
Work Phone: [phone]  
  
Summary Purpose  
  
  
                                                      
  
  
  
Family History  
No Family History Records Found  
  
                                        Description         Last Updated  
   
                                        Maternal history of epilepsy and recurre  
nt seizures 2021  
   
                                        Fraternal history of psychiatric disorde  
rs 2021  
   
                                        Maternal history of hypertension   
021  
   
                                        Maternal history of oncologic disorder 0  
2021  
   
                                        Maternal history of psychiatric disorder  
s 2021  
   
                                        Paternal history of hypertension   
021  
   
                                        Paternal history of oncologic disorder 0  
2021  
  
  
  
                                        Description         Last Updated  
   
                                        Maternal history of epilepsy and recurre  
nt seizures 2021  
  
  
  
                                                    Last Documented On   
1 4:06PM ; [facility]  
  
  
  
                                        Fraternal history of psychiatric disorde  
rs 2021  
   
                                        Maternal history of hypertension   
021  
   
                                        Maternal history of oncologic disorder 0  
2021  
   
                                        Maternal history of psychiatric disorder  
s 2021  
   
                                        Paternal history of hypertension   
021  
   
                                        Paternal history of oncologic disorder 0  
2021  
  
  
  
                                        Description         Last Updated  
   
                                        Maternal history of epilepsy and recurre  
nt seizures 2021  
  
  
  
                                                    Last Documented On   
1 4:06PM ; [facility]  
  
  
  
                                        Fraternal history of psychiatric disorde  
rs 2021  
   
                                        Maternal history of hypertension   
021  
   
                                        Maternal history of oncologic disorder 0  
2021  
   
                                        Maternal history of psychiatric disorder  
s 2021  
   
                                        Paternal history of hypertension   
021  
   
                                        Paternal history of oncologic disorder 0  
2021  
  
  
  
                                        Description         Last Updated  
   
                                        Maternal history of epilepsy and recurre  
nt seizures 2021  
  
  
  
                                                    Last Documented On   
1 4:06PM ; [facility]  
  
  
  
                                        Fraternal history of psychiatric disorde  
rs 2021  
   
                                        Maternal history of hypertension   
021  
   
                                        Maternal history of oncologic disorder 0  
2021  
   
                                        Maternal history of psychiatric disorder  
s 2021  
   
                                        Paternal history of hypertension   
021  
   
                                        Paternal history of oncologic disorder 0  
2021  
  
  
  
                                        Description         Last Updated  
   
                                        Maternal history of epilepsy and recurre  
nt seizures 2021  
  
  
  
                                                    Last Documented On   
1 4:06PM ; [facility]  
  
  
  
                                        Fraternal history of psychiatric disorde  
rs 2021  
   
                                        Maternal history of hypertension   
021  
   
                                        Maternal history of oncologic disorder 0  
2021  
   
                                        Maternal history of psychiatric disorder  
s 2021  
   
                                        Paternal history of hypertension   
021  
   
                                        Paternal history of oncologic disorder 0  
2021  
  
  
  
                                        Description         Last Updated  
   
                                        Maternal history of epilepsy and recurre  
nt seizures 2021  
  
  
  
                                                    Last Documented On   
1 4:06PM ; [facility]  
  
  
  
                                        Fraternal history of psychiatric disorde  
rs 2021  
   
                                        Maternal history of hypertension   
021  
   
                                        Maternal history of oncologic disorder 0  
2021  
   
                                        Maternal history of psychiatric disorder  
s 2021  
   
                                        Paternal history of hypertension   
021  
   
                                        Paternal history of oncologic disorder 0  
2021  
  
  
  
                                        Description         Last Updated  
   
                                        Maternal history of epilepsy and recurre  
nt seizures 2021  
  
  
  
                                                    Last Documented On   
1 4:06PM ; [facility]  
  
  
  
                                        Fraternal history of psychiatric disorde  
rs 2021  
   
                                        Maternal history of hypertension   
021  
   
                                        Maternal history of oncologic disorder 0  
2021  
   
                                        Maternal history of psychiatric disorder  
s 2021  
   
                                        Paternal history of hypertension   
021  
   
                                        Paternal history of oncologic disorder 0  
2021  
  
  
  
                                        Description         Last Updated  
   
                                        Maternal history of epilepsy and recurre  
nt seizures 2021  
  
  
  
                                                    Last Documented On   
1 4:06PM ; [facility]  
  
  
  
                                        Fraternal history of psychiatric disorde  
rs 2021  
   
                                        Maternal history of hypertension   
021  
   
                                        Maternal history of oncologic disorder 0  
2021  
   
                                        Maternal history of psychiatric disorder  
s 2021  
   
                                        Paternal history of hypertension   
021  
   
                                        Paternal history of oncologic disorder 0  
2021  
  
  
  
                                        Description         Last Updated  
   
                                        Maternal history of epilepsy and recurre  
nt seizures 2021  
  
  
  
                                                    Last Documented On   
1 4:06PM ; [facility]  
  
  
  
                                        Fraternal history of psychiatric disorde  
rs 2021  
   
                                        Maternal history of hypertension   
021  
   
                                        Maternal history of oncologic disorder 0  
2021  
   
                                        Maternal history of psychiatric disorder  
s 2021  
   
                                        Paternal history of hypertension   
021  
   
                                        Paternal history of oncologic disorder 0  
2021  
  
  
  
                                        Description         Last Updated  
   
                                        Maternal history of epilepsy and recurre  
nt seizures 2021  
  
  
  
                                                    Last Documented On   
1 4:06PM ; [facility]  
  
  
  
                                        Fraternal history of psychiatric disorde  
rs 2021  
   
                                        Maternal history of hypertension   
021  
   
                                        Maternal history of oncologic disorder 0  
2021  
   
                                        Maternal history of psychiatric disorder  
s 2021  
   
                                        Paternal history of hypertension   
021  
   
                                        Paternal history of oncologic disorder 0  
2021  
  
  
  
                                        Description         Last Updated  
   
                                        Maternal history of epilepsy and recurre  
nt seizures 2021  
  
  
  
                                                    Last Documented On   
1 4:06PM ; [facility]  
  
  
  
                                        Fraternal history of psychiatric disorde  
rs 2021  
   
                                        Maternal history of hypertension   
021  
   
                                        Maternal history of oncologic disorder 0  
2021  
   
                                        Maternal history of psychiatric disorder  
s 2021  
   
                                        Paternal history of hypertension   
021  
   
                                        Paternal history of oncologic disorder 0  
2021  
  
  
  
                                        Description         Last Updated  
   
                                        Maternal history of epilepsy and recurre  
nt seizures 2021  
  
  
  
                                                    Last Documented On   
1 4:06PM ; [facility]  
  
  
  
                                        Fraternal history of psychiatric disorde  
rs 2021  
   
                                        Maternal history of hypertension   
021  
   
                                        Maternal history of oncologic disorder 0  
2021  
   
                                        Maternal history of psychiatric disorder  
s 2021  
   
                                        Paternal history of hypertension   
021  
   
                                        Paternal history of oncologic disorder 0  
2021  
  
  
  
                                        Description         Last Updated  
   
                                        Maternal history of epilepsy and recurre  
nt seizures 2021  
  
  
  
                                                    Last Documented On  4:06PM ; [facility]  
  
  
  
                                        Fraternal history of psychiatric disorde  
rs 2021  
   
                                        Maternal history of hypertension   
021  
   
                                        Maternal history of oncologic disorder 0  
2021  
   
                                        Maternal history of psychiatric disorder  
s 2021  
   
                                        Paternal history of hypertension   
021  
   
                                        Paternal history of oncologic disorder 0  
2021  
  
  
  
Advance Directives  
No Advanced Directives Records FoundDocuments on File  
  
                          Type         Date Recorded Patient Representative Expl  
anation  
   
                          ACP-Advance Directive                             
   
                          ACP-Power of                              
  
                                Documents on File  
  
                          Type         Date Recorded Patient Representative Expl  
anation  
   
                          ACP-Advance Directive                             
   
                          ACP-Power of                              
  
  
  
Physical Exam  
  
  
Physical Exam not supported for this document type  
  
No Physical Exam Recorded  
  
Physical Exam not supported for this document type  
  
No Physical Exam Recorded  
  
Physical Exam not supported for this document type  
  
No Physical Exam Recorded  
  
Physical Exam not supported for this document type  
  
No Physical Exam Recorded  
  
Physical Exam not supported for this document type  
  
No Physical Exam Recorded  
  
Physical Exam not supported for this document type  
  
No Physical Exam Recorded  
  
Physical Exam not supported for this document type  
  
No Physical Exam Recorded  
  
Physical Exam not supported for this document type  
  
No Physical Exam Recorded  
  
Physical Exam not supported for this document type  
  
No Physical Exam Recorded  
  
Physical Exam not supported for this document type  
  
No Physical Exam Recorded  
  
Physical Exam not supported for this document type  
  
No Physical Exam Recorded  
  
Physical Exam not supported for this document type  
  
No Physical Exam Recorded  
  
Physical Exam not supported for this document type  
  
No Physical Exam Recorded  
  
Physical Exam not supported for this document type  
  
No Physical Exam Recorded  
  
Physical Exam not supported for this document type  
  
No Physical Exam Recorded  
  
Physical Exam not supported for this document type  
  
No Physical Exam Recorded  
  
Physical Exam not supported for this document type  
  
No Physical Exam Recorded  
  
Physical Exam not supported for this document type  
  
No Physical Exam Recorded  
  
Physical Exam not supported for this document type  
  
No Physical Exam Recorded  
  
Physical Exam not supported for this document type  
  
No Physical Exam Recorded  
  
Physical Exam not supported for this document type  
  
No Physical Exam Recorded  
  
Physical Exam not supported for this document type  
  
No Physical Exam Recorded  
  
Physical Exam not supported for this document type  
  
No Physical Exam Recorded  
  
Physical Exam not supported for this document type  
  
No Physical Exam Recorded  
  
Physical Exam not supported for this document type  
  
No Physical Exam Recorded  
  
Physical Exam not supported for this document type  
  
No Physical Exam Recorded  
  
Physical Exam not supported for this document type  
  
No Physical Exam Recorded  
  
Physical Exam not supported for this document type  
  
No Physical Exam Recorded  
  
Physical Exam not supported for this document type  
  
No Physical Exam Recorded  
  
Reason for Referral  
  
  
                          Status       Reason       Specialty    Diagnoses /   
Procedures                              Referred By   
Contact                                 Referred To   
Contact  
   
                          Pending Review                             
  
  
Diagnoses  
  
  
Tachycardia  
  
  
  
Procedures  
  
  
Holter Monitor 48   
Hour                                      
  
  
Doreen Cabrera APRN - CNP  
  
  
[address]  
  
  
[place]   
[address]  
  
  
Phone:   
[phone]  
  
  
Fax: [phone]                         
  
  
  
  
  
Additional Source Comments  
  
  
  
                                                    INFORMATION SOURCE (unrecogn  
ized section and content)  
   
                                          
  
  
  
                                        DATE CREATED        AUTHOR  
   
                                2018                      [facility]  
  
  
  
                                DATE CREATED    AUTHOR          AUTHOR'S ORGANIZ  
ATION  
   
                                2021                      [facility]  
  
  
  
                                DATE CREATED    AUTHOR          AUTHOR'S ORGANIZ  
ATION  
   
                                2021                      [facility]  
  
  
  
                                DATE CREATED    AUTHOR          AUTHOR'S ORGANIZ  
ATION  
   
                                2021                      [facility]  
  
  
  
                                DATE CREATED    AUTHOR          AUTHOR'S ORGANIZ  
ATION  
   
                                10/05/2021                      [facility]  
  
  
  
                                DATE CREATED    AUTHOR          AUTHOR'S ORGANIZ  
ATION  
   
                                10/25/2022                      [facility]  
  
  
  
                                DATE CREATED    AUTHOR          AUTHOR'S ORGANIZ  
ATION  
   
                                05/10/2024                      [facility]  
  
  
  
                                DATE CREATED    AUTHOR          AUTHOR'S ORGANIZ  
ATION  
   
                                10/22/2024                      [facility]  
[facility]  
  
  
  
  
  
                                                    Reason for Visit (unrecogniz  
ed section and content)  
   
                                          
  
  
  
                                        Reason              Comments  
   
                                        Suicidal            with a plan of overd  
osing on zoloft  
  
  
  
                                        Reason              Comments  
   
                                        Mental Health Problem patient states she  
 is feeling homicidal and wants meds   
adjusted, doesn't feel they are working  
   
                                        Panic Attack          
  
  
  
                                        Reason              Comments  
   
                                        Homicidal           pt states she is on   
a  donw slope  of her bipolar, pt states  I want   
to   
kill anyone who pisses me off   
  
  
  
                          Status       Reason       Specialty    Diagnoses /   
Procedures                              Referred By   
Contact                                 Referred To   
Contact  
   
                          Pending Review                             
  
  
Diagnoses  
  
  
Tachycardia  
  
  
  
Procedures  
  
  
Holter Monitor 48   
Hour                                      
  
  
Doreen Cabrera,   
APRN - CNP  
  
  
[address] [place]   
[address]  
  
  
Phone:   
[phone]  
  
  
Fax: [phone]                         
  
  
  
  
  
                                    Scheduled  
  
                                                    Active and Recently Administ  
ered Medications (unrecognized section and content)  
   
                                          
  
  
  
                          Medication Order 2021  
   
                                                      
  
  
ALPRAZolam (XANAX) tablet 0.5 mg   
(COMPLETED)  
0.5 mg, Oral, ONCE, On Sat   
21 at 2100, For 1 dose  
                                                    2109 (Given - Provider: Dottie Barcenas RN)  
                                          
  
                                       PRN  
  
                          Medication Order 2021  
   
                                                      
  
  
hydrOXYzine (VISTARIL) capsule   
50 mg  
50 mg, Oral, 3 TIMES DAILY PRN,   
Itching, Starting on 21   
at 2147  
                                                            2212 (Given - Provid  
er: Kareem Montiel RN)  
  
  
  
  
  
  
                                                    Care Teams (unrecognized sec  
tion and content)  
   
                                          
  
  
  
                      Team Member Relationship Specialty  Start Date End Date  
   
                                                      
  
  
Doreen Cabrera, APRN - CNP  
  
  
[address] [place] [id number] [phone] (Work)  
  
  
[phone] (Fax) PCP - General   Family Medicine 21           
  
  
FOR RECORDS PERTAINING TO PATIENTS WHO ARE OR HAVE BEEN ENROLLED IN A CHEMICAL 
DEPENDENCY/SUBSTANCEABUSE PROGRAM, SOME INFORMATION MAY BE OMITTED. This 
clinical summary was aggregated from multiple sources. Caution should be 
exercised in using it in the provision of clinical care. This summary normalizes
information from multiple sources, and as a consequence, information in this 
document may materially change the coding, format and clinical context of 
patient data. In addition, data may be omitted in some cases. CLINICAL DECISIONS
SHOULD BE BASED ON THE PRIMARY CLINICAL RECORDS. [employer] provides 
no warranty or guarantee of the accuracy or completeness of information in this 
document.

## 2024-11-15 NOTE — PM.ONB
Brief Operative Note
Date of procedure: 10/25/24
Pre-op diagnosis general: acute blood loss anemia, menorrhagia
Post-op diagnosis: same as pre-op
Procedure: 
NAME OF PROCEDURE: [ D&c hysteroscopy with myosure]
 

PROCEDURE:

The patient was taken back to the Operating Room where she was prepped and draped in normal sterile fashion after being placed under general anesthesia without difficulty. She was also placed in the dorsal lithotomy position. A weighted speculum was 
placed in the patient?s vagina. The anterior lip of the cervix was identified and grasped with a single tooth tenaculum. The patient?s uterus was then sounded roughly to [? 8] cm. The patient was then gently dilated using Hegar dilators. The 
hysteroscope was passed through the patient?s cervix into the uterus. Both ostia were identified. fluffy appearing endometrium. No gross evidence of malignancy, no gross evidence of polyps or fibroids.  The myosure apparatus was placed through the 
scope, The myosure was engaged and endometrial curretting were removed along with endometrial polyp, The hysteroscope was then removed from the uterus. 
 The endometrial curettings were sent out to pathology. The single tooth tenaculum was then removed from the patient's anterior lip of the cervix where excellent hemostasis was noted. All instruments were removed from the patient?s vagina. The 
patient tolerated the procedure well. Sponge, lap and needle counts were correct times two. The patient was taken to the Recovery Room in stable condition.Room in stable condition.  
Anesthesia: JOSE
Surgeon: Braxton Patterson
Estimated blood loss (mL): 10
Pathology: other (endometrial currettings)
Condition: stable
Disposition: PACU

Urinary Catheter Management
Urinary Catheter Management
Urethral: 
      Cath placed during this visit: no

## 2024-12-10 ENCOUNTER — HOSPITAL ENCOUNTER
Age: 24
Discharge: HOME | End: 2024-12-10
Payer: COMMERCIAL

## 2024-12-10 VITALS
OXYGEN SATURATION: 99 % | DIASTOLIC BLOOD PRESSURE: 75 MMHG | SYSTOLIC BLOOD PRESSURE: 123 MMHG | HEART RATE: 67 BPM | TEMPERATURE: 97.7 F

## 2024-12-10 DIAGNOSIS — R10.2: ICD-10-CM

## 2024-12-10 DIAGNOSIS — N92.0: ICD-10-CM

## 2024-12-10 DIAGNOSIS — Z01.812: Primary | ICD-10-CM

## 2024-12-10 DIAGNOSIS — N93.9: ICD-10-CM

## 2024-12-10 LAB
ADD MANUAL DIFF: NO
HCT VFR BLD CALC: 34.3 % (ref 36–48)
HEMATOCRIT: 34.3 % (ref 36–48)
HEMOGLOBIN: 10.6 G/DL (ref 12–16)
IMMATURE GRANULOCYTES ABS AUTO: 0.08 10^3/UL (ref 0–0.03)
IMMATURE GRANULOCYTES PCT AUTO: 0.7 % (ref 0–0.5)
LYMPHOCYTES  ABSOLUTE AUTO: 2.4 10^3/UL (ref 1.2–3.8)
MCV RBC: 80.3 FL (ref 81–99)
MEAN CORPUSCULAR HEMOGLOBIN: 24.8 PG (ref 26.7–34)
MEAN CORPUSCULAR HGB CONC: 30.9 G/DL (ref 29.9–35.2)
MEAN CORPUSCULAR VOLUME: 80.3 FL (ref 81–99)
PLATELET # BLD: 270 10^3/UL (ref 150–450)
PLATELET COUNT: 270 10^3/UL (ref 150–450)
RED BLOOD COUNT: 4.27 10^6/UL (ref 4.2–5.4)
WBC # BLD: 12 10^3/UL (ref 4–11)
WHITE BLOOD COUNT: 12 10^3/UL (ref 4–11)

## 2024-12-10 PROCEDURE — 86900 BLOOD TYPING SEROLOGIC ABO: CPT

## 2024-12-10 PROCEDURE — 86850 RBC ANTIBODY SCREEN: CPT

## 2024-12-10 PROCEDURE — 86901 BLOOD TYPING SEROLOGIC RH(D): CPT

## 2024-12-10 PROCEDURE — 85025 COMPLETE CBC W/AUTO DIFF WBC: CPT

## 2024-12-10 PROCEDURE — 36415 COLL VENOUS BLD VENIPUNCTURE: CPT

## 2024-12-18 ENCOUNTER — HOSPITAL ENCOUNTER (OUTPATIENT)
Age: 24
Discharge: HOME | End: 2024-12-18
Payer: COMMERCIAL

## 2024-12-18 VITALS — DIASTOLIC BLOOD PRESSURE: 96 MMHG | HEART RATE: 97 BPM | SYSTOLIC BLOOD PRESSURE: 136 MMHG | OXYGEN SATURATION: 96 %

## 2024-12-18 VITALS — DIASTOLIC BLOOD PRESSURE: 88 MMHG | HEART RATE: 88 BPM | SYSTOLIC BLOOD PRESSURE: 155 MMHG | OXYGEN SATURATION: 99 %

## 2024-12-18 VITALS — SYSTOLIC BLOOD PRESSURE: 142 MMHG | DIASTOLIC BLOOD PRESSURE: 91 MMHG | OXYGEN SATURATION: 97 % | HEART RATE: 98 BPM

## 2024-12-18 VITALS
HEART RATE: 96 BPM | OXYGEN SATURATION: 96 % | SYSTOLIC BLOOD PRESSURE: 112 MMHG | TEMPERATURE: 97.88 F | DIASTOLIC BLOOD PRESSURE: 69 MMHG

## 2024-12-18 VITALS — OXYGEN SATURATION: 97 % | HEART RATE: 92 BPM | SYSTOLIC BLOOD PRESSURE: 127 MMHG | DIASTOLIC BLOOD PRESSURE: 89 MMHG

## 2024-12-18 VITALS — BODY MASS INDEX: 56.6 KG/M2

## 2024-12-18 VITALS
SYSTOLIC BLOOD PRESSURE: 149 MMHG | TEMPERATURE: 98.1 F | OXYGEN SATURATION: 97 % | DIASTOLIC BLOOD PRESSURE: 63 MMHG | HEART RATE: 104 BPM

## 2024-12-18 VITALS — DIASTOLIC BLOOD PRESSURE: 97 MMHG | OXYGEN SATURATION: 96 % | SYSTOLIC BLOOD PRESSURE: 153 MMHG | HEART RATE: 95 BPM

## 2024-12-18 VITALS — HEART RATE: 92 BPM | SYSTOLIC BLOOD PRESSURE: 137 MMHG | DIASTOLIC BLOOD PRESSURE: 95 MMHG | OXYGEN SATURATION: 97 %

## 2024-12-18 DIAGNOSIS — G47.33: ICD-10-CM

## 2024-12-18 DIAGNOSIS — E66.01: ICD-10-CM

## 2024-12-18 DIAGNOSIS — I10: ICD-10-CM

## 2024-12-18 DIAGNOSIS — N92.0: Primary | ICD-10-CM

## 2024-12-18 DIAGNOSIS — D50.9: ICD-10-CM

## 2024-12-18 DIAGNOSIS — N93.8: ICD-10-CM

## 2024-12-18 DIAGNOSIS — Z88.1: ICD-10-CM

## 2024-12-18 DIAGNOSIS — R10.2: ICD-10-CM

## 2024-12-18 DIAGNOSIS — F31.9: ICD-10-CM

## 2024-12-18 DIAGNOSIS — Z87.891: ICD-10-CM

## 2024-12-18 DIAGNOSIS — Z79.899: ICD-10-CM

## 2024-12-18 DIAGNOSIS — Z88.8: ICD-10-CM

## 2024-12-18 DIAGNOSIS — F43.10: ICD-10-CM

## 2024-12-18 DIAGNOSIS — K21.9: ICD-10-CM

## 2024-12-18 DIAGNOSIS — F41.9: ICD-10-CM

## 2024-12-18 DIAGNOSIS — Z79.84: ICD-10-CM

## 2024-12-18 LAB
ADD MANUAL DIFF: NO
HCT VFR BLD CALC: 37.4 % (ref 36–48)
HEMATOCRIT: 37.4 % (ref 36–48)
HEMOGLOBIN: 11.5 G/DL (ref 12–16)
IMMATURE GRANULOCYTES ABS AUTO: 0.09 10^3/UL (ref 0–0.03)
IMMATURE GRANULOCYTES PCT AUTO: 0.6 % (ref 0–0.5)
LYMPHOCYTES  ABSOLUTE AUTO: 3.2 10^3/UL (ref 1.2–3.8)
MCV RBC: 80.4 FL (ref 81–99)
MEAN CORPUSCULAR HEMOGLOBIN: 24.7 PG (ref 26.7–34)
MEAN CORPUSCULAR HGB CONC: 30.7 G/DL (ref 29.9–35.2)
MEAN CORPUSCULAR VOLUME: 80.4 FL (ref 81–99)
PLATELET # BLD: 288 10^3/UL (ref 150–450)
PLATELET COUNT: 288 10^3/UL (ref 150–450)
RED BLOOD COUNT: 4.65 10^6/UL (ref 4.2–5.4)
WBC # BLD: 14.4 10^3/UL (ref 4–11)
WHITE BLOOD COUNT: 14.4 10^3/UL (ref 4–11)

## 2024-12-18 PROCEDURE — 85025 COMPLETE CBC W/AUTO DIFF WBC: CPT

## 2024-12-18 PROCEDURE — 84702 CHORIONIC GONADOTROPIN TEST: CPT

## 2024-12-18 PROCEDURE — 82948 REAGENT STRIP/BLOOD GLUCOSE: CPT

## 2024-12-18 PROCEDURE — 36415 COLL VENOUS BLD VENIPUNCTURE: CPT

## 2024-12-18 PROCEDURE — 58563 HYSTEROSCOPY ABLATION: CPT

## 2024-12-18 NOTE — PM.ONB
Brief Operative Note
Date of procedure: 12/18/24
Pre-op diagnosis general: menorrhagia
Post-op diagnosis: same as pre-op
Procedure: 
NAME OF PROCEDURE: [ ] Bisi endometrial ablation with hysteroscopy.


PROCEDURE: 
The patient was taken back to the OR where she was prepped and draped in the normal sterile fashion after being placed in the dorsal lithotomy position, after being placed under general anesthesia without difficulty.? A weighted speculum was placed 
into the vagina. The anterior lip was grasped with a single tooth tenaculum. The patient was then sounded to approximated 8cm. The patient?s cervix was gently dilated using hegardilators. The hysteroscope was passed through the cervix into the 
uterus where both ostia were seen. No gross evidence of polyps, fibroids or malignancy. The cervical length was noted to be 4 cm. The total cavity length is 4cm.? The Bisi ablation apparatus was set to approximately 4cm in length. This was placed 
through the cervix and into the uterus. After the seal was tested, at that time the total ablation of 120 seconds was performed with the Bisi withoutdifficulty. All instruments were removed from the vagina. Excellent hemostasis noted.? Sponge and 
lap count correct times 2.? Patient taken to recovery in stable condition. 

Anesthesia: GETA
Surgeon: Braxton Patterson
Estimated blood loss (mL): 5
Pathology: none sent
Condition: stable
Disposition: PACU

Urinary Catheter Management
Urinary Catheter Management
Urethral: 
      Cath placed during this visit: no

## 2024-12-18 NOTE — PC.NURSE
0920: pt reports cramping,requesting tylenol. order received from  to give pt 1,000mg Tylenol PO one time.

## 2025-02-01 ENCOUNTER — HOSPITAL ENCOUNTER (EMERGENCY)
Age: 25
Discharge: HOME | End: 2025-02-01
Payer: COMMERCIAL

## 2025-02-01 VITALS
DIASTOLIC BLOOD PRESSURE: 77 MMHG | HEART RATE: 120 BPM | TEMPERATURE: 98.7 F | OXYGEN SATURATION: 97 % | SYSTOLIC BLOOD PRESSURE: 109 MMHG

## 2025-02-01 VITALS — BODY MASS INDEX: 50.8 KG/M2

## 2025-02-01 DIAGNOSIS — K64.4: Primary | ICD-10-CM

## 2025-02-01 DIAGNOSIS — F17.290: ICD-10-CM

## 2025-02-01 PROCEDURE — 99283 EMERGENCY DEPT VISIT LOW MDM: CPT

## 2025-02-01 NOTE — XMS_ITS
Comprehensive CCD (C-CDA v2.1)  
  
                          Created on: 2025  
  
  
ANETA MCPHERSONLISIMONE CAMPO~ANETA  
External Reference #: CDR,PersonID:[id number]  
: 2000  
Sex: Female  
  
Demographics  
  
  
                                        Address             [address] [place] [address]  [address]  
   
                                        Home Phone          [phone]  
   
                                        Home Phone          [phone]  
   
                                        Home Phone          [phone]  
   
                                        Home Phone          [phone]  
   
                                        Home Phone          [phone]  
   
                                        Preferred Language  en  
   
                                        Marital Status      Single  
   
                                        Christian Affiliation Unknown  
   
                                        Race                Unknown  
   
                                        Ethnic Group        Not  or Lati  
no  
  
  
Author  
  
  
                                        Organization        [facility]  
  
  
Care Team Providers  
  
  
                                Care Team Member Name Role            Phone  
   
                                STANLEY NERI MD Unavailable     Unavailab  
STANLEY Ramirez MD Unavailable     Unavailab  
nilda  
   
                                NONE            Unavailable     Unavailable  
   
                                ANYA OLIVARES Attending       Unavailable  
   
                                ANYA OLIVARES Consulting      Unavailable  
   
                                ANYA OLIVARES Admitting       Unavailable  
   
                                Deborah LARISSA, Doreen Primary Care Provider [phone]  
   
                                Deborah APRN - LARISSA, Doreen M Primary Care Provider  
 [phone]  
   
                                MOLLY SALEH C Admitting       Unavailable  
   
                                MOLLY SALEH C Attending       Unavailable  
   
                                ISMAEL ORTEGA Referring       Unavailable  
   
                                DEBORAH, DOREEN M Primary Care    Unavailable  
   
                                MALIKA SHEPHERD Attending       Unavailable  
   
                                DEBORAH, DOREEN M Primary Care    Unavailable  
   
                                ISMAEL ORTEGA Attending       Unavailable  
   
                                DEBORAH, DOREEN M Primary Care    Unavailable  
   
                                RAFAEL GALLAGHER   Attending       Unavailable  
   
                                DEBORAH, ODREEN M Primary Care    Unavailable  
   
                                DEBORAH, DOREEN M Referring       Unavailable  
   
                                DEBORAH, DROEEN M Primary Care    Unavailable  
   
                                Deborah APRN - CNP, Doreen M Primary Care Provider  
 [phone]  
   
                                DEBORAH, DOREEN M Referring       Unavailable  
   
                                DEBORAH, DOREEN M Primary Care    Unavailable  
   
                                Deborah CNP, Doreen Primary Care Provider [phone]  
   
                                DEBORAH, DOREEN M Primary Care    Unavailable  
   
                                ABRAHAM MARIE Attending       Unavailable  
   
                                ABRAHAM MARIE Attending       Unavailable  
   
                                ABRAHAM MARIE Referring       Unavailable  
   
                                DEBORAH, DOREEN M Primary Care    Unavailable  
   
                                Deborah CNP, Doreen Unavailable     [phone]  
   
                                DO Braxton Patterson Attending Provider [phone]  
   
                                Braxton Patterson    Attending       Unavailable  
   
                                Braxton Patterson    Admitting       Unavailable  
   
                                BRAXTON PATTERSON    Attending       Unavailable  
   
                                IGNACIO CAMPBELL      Attending       Unavailable  
   
                                BRAXTON PATTERSON    Attending       Unavailable  
  
  
  
Allergies  
  
  
                                                    Allergy   
Classification                          Reported   
Allergen(s)               Allergy Type              Date of   
Onset                     Reaction(s)               Facility  
   
                                                    Amoxicillin /   
Clavulanate  
(6 sources)                             Amoxicillin /   
Clavulanate;   
Translations:   
[Augmentin   
500-125 MG Oral   
Tablet]                   Drug Allergy                
1                         [facility]  
   
                                                    ARIPiprazole  
(6 sources)                             ARIPiprazole;   
Translations:   
[Abilify]                 Drug Allergy                
1                         [facility]  
   
                                                    metFORMIN  
(6 sources)                             metFORMIN;   
Translations:   
[metformin]               Drug Allergy                
1                                       Nausea,   
Vomiting                                [facility]  
   
                                                    Serotonin Reuptake   
Inhibitors (SSRIs)  
(7 sources)                             Sertraline;   
Translations:   
[trazodone   
hydrochloride]            Drug Allergy                
1                                       Panic attack,   
slow/[facility]  
   
                                                    Unclassified  
(6 sources)                             Amoxicillin-Pot   
Clavulanate;   
Translations:   
[AMOXICILLIN-PO  
T CLAVULANATE]                          Propensity to   
adverse   
reactions to   
drug                                      
7                                                   [facility]  
   
                                                      
(1 source)                              Amoxicillin /   
Clavulanate               Drug Allergy                
4                                                   [facility]   
Repository  
   
                                                      
(20 sources)                            Amoxicillin /   
Clavulanate;   
Translations:   
[Augmentin   
500-125 MG Oral   
Tablet]                   Drug Allergy                
1                         [facility]  
   
                                                      
(20 sources)                            ARIPiprazole;   
Translations:   
[Abilify]                 Drug Allergy                
1                         [facility]  
   
                                                      
(20 sources)                            metFORMIN;   
Translations:   
[METFORMIN]               Drug Allergy                
0                                       Nausea,   
Vomiting                                [facility]  
   
                                                      
(15 sources)        Sertraline          Drug Allergy          
1                         [facility]/ [facility]              [facility]  
   
                                                      
(4 sources)         traZODone           Drug Allergy        10-  
2                         Panic attack              [facility]  
   
                                                      
(3 sources)                             nickel;   
Translations:   
[NICKEL]                  Drug Allergy                
7                         Nausea                    [facility]   
Repository  
   
                                                      
(1 source)                              CARBONIC   
ANHYDRASE   
INHIBITORS;   
Translations:   
[CARBONIC   
ANHYDRASE   
INHIBITORS]                             Propensity to   
adverse   
reactions to   
drug   
(disorder)                                
7                                                   [facility]   
Repository  
   
                                                      
(9 sources)         Sertraline          Drug Allergy          
4                         slow/[facility]               [facility]  
   
                                                      
(9 sources)         traZODone           Drug Allergy          
4                         Panic attack              [facility]  
   
                                                      
(2 sources)                             Amoxicillin;   
Translations:   
[amoxicillin]             Drug Allergy                
0                         Anaphylaxis               [facility]  
   
                                                      
(2 sources)                             Clavulanate;   
Translations:   
[clavulanic   
acid]                     Drug Allergy                
0                         Anaphylaxis               [facility]  
   
                                                      
(2 sources)                             diphenhydrAMINE  
; Translations:   
[diphenhydramin  
e]                        Drug Allergy                
0                         [facility]  
   
                                                      
(2 sources)                             petrolatum,whit  
e;   
Translations:   
[petrolatum,whi  
te]                                     Propensity to   
adverse   
reactions                                 
0                         [facility]  
  
  
  
Medications  
Current Medications  
  
  
  
                      Medication Drug Class(es) Dates      Sig (Normalized) Sig   
(Original)  
   
                                                    Alcohol Pads 70%  
(8 sources)                                         Start:   
2024                                          Alcohol Pads 70%   
2024   
Provider: Doreen Cabrera CNP  
   
                                                    ARIPiprazole 5 mg   
oral tablet  
(20 sources)                            Atypical   
Antipsychotic                           Start:   
10-  
End:   
2024                                          ARIPiprazole 5 MG   
Oral Tablet   
2024   
Provider: Doreen Cabrera CNP  
  
  
  
                                        Start: 2020   take 2 mg by mouth o  
nce daily   
at bedtime                              Aripiprazole Active 2 MG PO Daily at   
bedtime    
12:00am  
  
  
  
                                                    atorvastatin 20 mg oral   
tablet  
(8 sources)                             HMG-CoA Reductase   
Inhibitor           Start: 2024                       Atorvastatin Calcium  
   
20 MG Oral Tablet   
2024 Provider:   
Doreen Cabrera CNP  
   
                                                    Blood Glucose System Sriram   
Kit  
(8 sources)                     Start: 2024                 Blood Glucose   
System   
Sriram Kit 2024   
Provider: Doreen Cabrera CNP  
   
                                                    busPIRone hydrochloride   
10 mg oral tablet  
(20 sources)                    Start: 2024                 busPIRone HCl   
10 MG   
Oral Tablet 2024   
Provider: Doreen Cabrera CNP  
  
  
  
                                                    Start: 2023  
End: 2024                                     busPIRone HCl 5 MG Oral Tabl  
et 2024 - 2024   
Provider:   
Doreen Cabrera CNP  
  
  
  
                                                    docusate sodium   
100 mg oral   
capsule  
(6 sources)                     Start: 2024                 Colace 100 MG   
Oral Capsule   
2024   
Provider: Doreen Cabrera CNP  
   
                                                    Norgestimate-Ethin  
yl Estradiol  
(1 source)          Progestin, Estrogen Start: 2020   take 1 tablet   
by mouth once   
daily                                   Norgestimate-Ethi  
nyl Estradiol   
(Mono-Linyah)   
0.25-35 mg-mcg   
tablet Active 1   
TAB PO Daily   
2020   
12:00am  
   
                                                    24 hr guanFACINE 1   
mg extended   
release oral   
tablet  
(19 sources)                            Central alpha-2   
Adrenergic Agonist                      Start: 2024  
End: 2024                                     Intuniv 1 MG Oral   
Tablet Extended   
Release 24 Hour   
2024   
Provider: Doreen Cabrera CNP  
   
                                                    hydrOXYzine   
pamoate 50 mg oral   
capsule  
(2 sources)     Antihistamine   Start: 2021                 hydrOXYzine   
(VISTARIL)   
capsule 50 mg  
  
  
  
                                                take 1 tablet by mouth once verito  
y hydrOXYzine (ATARAX) 10 MG tablet Take 10 mg   
by   
mouth nightly 0 Active  
  
  
  
                                                    ibuprofen 400 mg   
oral tablet  
(2 sources)                             Nonsteroidal   
Anti-inflammatory Drug                  Start:   
2021                              take 1 tablet   
by mouth   
every six   
hours as   
needed for   
pain                                    ibuprofen   
(ADVIL;MOTRIN) 400   
MG tablet Take 1   
tablet by mouth   
every 6 hours as   
needed for Pain   
120 tablet 0   
2021 Active  
   
                                                    Iron  
(6 sources)                                         Start:   
2024                                          [employer] Iron 325 (65   
Fe) MG Oral Tablet   
2024   
Provider: Doreen Cabrera CNP  
   
                                                    lisinopril 5 mg   
oral tablet  
(8 sources)                             Angiotensin Converting   
Enzyme Inhibitor                        Start:   
2024                                          Lisinopril 5 MG   
Oral Tablet   
2024   
Provider: Doreen Cabrera CNP  
   
                                                    LORazepam 0.5 mg   
oral tablet  
(3 sources)         Benzodiazepine                          take 0.5 mg   
by mouth once   
daily as   
needed                                  LORazepam (ATIVAN   
PO) Take 0.5 mg by   
mouth daily as   
needed 0 Active  
   
                                                    meclizine   
hydrochloride 25   
mg oral tablet  
(3 sources)               Antiemetic                Start:   
2017                              take 1 tablet   
by mouth   
three times   
daily as   
needed for   
dizziness                               meclizine   
(ANTIVERT) 25 MG   
tablet Take 1   
tablet by mouth 3   
times daily as   
needed for   
Dizziness 30   
tablet 0   
2017 Active  
   
                                                    metoprolol   
tartrate 25 mg   
oral tablet  
(1 source)                              beta-Adrenergic   
Blocker                                 Start:   
2020                              take 25 mg by   
mouth twice   
daily                                   Metoprolol   
Tartrate Active 25   
MG PO Twice daily   
2020   
12:00am  
   
                                                    naloxone   
hydrochloride 40   
mg/ml nasal spray  
(20 sources)              Opioid Antagonist         Start:   
2021  
End:   
2021                                          Narcan 4 MG/0.1ML   
Nasal Liquid   
2021   
Provider: Doreen Cabrera CNP  
   
                                                    Naproxen  
(3 sources)                             Nonsteroidal   
Anti-inflammatory Drug                                         Naproxen Sodium   
(ALEVE PO) Take 1   
tablet by mouth as   
needed 0 Active  
   
                                                    polyethylene   
glycol [id number] [id number]   
mg powder for oral   
solution  
(20 sources)              Osmotic Laxative          Start:   
2021                                          MiraLax 17   
GM/SCOOP Oral   
Powder 2021   
Provider: Doreen Cabrera CNP  
   
                                                    SITagliptin 50 mg   
oral tablet  
(11 sources)                            Dipeptidyl Peptidase 4   
Inhibitor                               Start:   
2024                                          Januvia 50 MG Oral   
Tablet 2024   
Provider: Doreen Cabrera CNP  
  
  
  
                                                take 500 mg by mouth once daily   
SITagliptin Phosphate (JANUVIA PO) Take 500 mg   
by   
mouth daily 0 Active  
  
  
  
                                                    topiramate 25 mg oral   
tablet  
(3 sources)                                                 take 25 mg by mouth   
twice daily                             Topiramate (TOPAMAX PO)   
Take 25 mg by mouth 2   
times daily 0 Active  
   
                                                    {24 (drospirenone 4 MG   
Oral Tablet) / 4 (inert   
ingredients 1 MG Oral   
Tablet) } Pack [Slynd]  
(8 sources)                                         Start:   
2024                                          Slynd 4 MG Oral Tablet   
2024 Provider:   
Doreen Cabrera CNP  
  
  
  
Completed/Discontinued Medications  
  
  
  
                      Medication Drug Class(es) Dates      Sig (Normalized) Sig   
(Original)  
   
                                                    ALPRAZolam 0.5 mg oral   
tablet  
(1 source)                Benzodiazepine            Start:   
2021  
End:   
2021                                          ALPRAZolam   
(XANAX) tablet   
0.5 mg  
   
                                                    azithromycin 250 mg oral   
tablet  
(20 sources)                            Macrolide   
Antimicrobial                           Start:   
2021  
End:   
2021                                          Azithromycin 250   
MG Oral Tablet   
2021 -   
2021   
Provider:  
   
                                                    brompheniramine maleate   
0.4 mg/ml /   
dextromethorphan   
hydrobromide 2 mg/ml /   
pseudoephedrine   
hydrochloride 6 mg/ml   
oral solution  
(20 sources)                            alpha-Adrenergic   
Agonist,   
Uncompetitive   
N-methyl-D-aspartat  
e Receptor   
Antagonist, Sigma-1   
Agonist                                 Start:   
2021  
End:   
2021                                          Pseudoeph-Bromph  
en-DM 30-2-10   
MG/5ML Oral   
Syrup 2021   
- 2021   
Provider:  
   
                                                    escitalopram 5 mg oral   
tablet  
(20 sources)                            Serotonin Reuptake   
Inhibitor                               Start:   
2021  
End:   
10-                                          Lexapro 5 MG   
Oral Tablet   
2021 -   
2021   
Provider:  
   
                                                    hydroCHLOROthiazide 25   
mg / spironolactone 25   
mg oral tablet  
(20 sources)                            Thiazide Diuretic,   
Aldosterone   
Antagonist                              Start:   
2023  
End:   
2023                                          Spironolactone-H  
CTZ 25-25 MG   
Oral Tablet   
2023 -   
2023   
Provider: Doreen Cabrera CNP  
  
  
  
                                                    Start: 2021  
End: 2023                                     Aldactazide 50-50 MG Oral Ta  
blet 06/10/2021 - 2021   
Provider: Doreen Cabrera CNP  
  
  
  
                                                    hydrocortisone 10   
mg/ml / neomycin 3.5   
mg/ml / polymyxin b   
06685 unt/ml otic   
suspension  
(20 sources)                            Aminoglycoside   
Antibacterial,   
Polymyxin-class   
Antibacterial,   
Corticosteroid                          Start:   
2021  
End: 2021                                     Neomycin-Polymyxin-HC   
[id number] Otic   
Suspension 2021 -   
2021 Provider: Doreen Cabrera CNP  
   
                                                    ketorolac   
tromethamine 10 mg   
oral tablet  
(20 sources)                            Nonsteroidal   
Anti-inflammatory   
Drug, Cyclooxygenase   
Inhibitor                               Start:   
2022  
End: 2022                                     Ketorolac Tromethamine 10   
MG Oral Tablet 2022   
- 2022 Provider:   
Julieth Pisano CNP  
   
                                                    lamoTRIgine 100 mg   
oral tablet  
(20 sources)                            Mood Stabilizer,   
Anti-epileptic Agent                    Start:   
2024  
End: 2024                                     lamoTRIgine 100 MG Oral   
Tablet 2024 -   
2024 Provider: Doreen Cabrera CNP  
   
                                                    metFORMIN   
hydrochloride 500 mg   
oral tablet  
(20 sources)              Biguanide                 Start:   
07-  
End: 2024                                     metFORMIN HCl 500 MG Oral   
Tablet 07/15/2023 -   
2024 Provider:  
  
  
  
                                                    Start: 2021  
End: 10-                         take 1 tablet by mouth every   
twenty-four hours                       metFORMIN HCl  MG Oral   
Tablet Extended Release 24 Hour   
2021 - 10/21/2022 Provider:   
Doreen Cabrera CNP  
  
  
  
                                                    methylPREDNISolone 4 mg   
oral tablet  
(20 sources)              Corticosteroid            Start:   
2021  
End: 2021                                     methylPREDNISolone 4 MG   
Oral Tablet Therapy   
Pack 2021 -   
2021 Provider:  
   
                                                    prazosin 1 mg oral   
capsule  
(20 sources)                            alpha-Adrenergic   
Blocker                                 Start:   
2023  
End: 2024                                     Prazosin HCl 1 MG Oral   
Capsule 2024 -   
2024 Provider:   
Doreen Cabrera CNP  
  
  
  
                                                    Start: 2022  
End: 2023                                     Prazosin HCl 1 MG Oral Capsu  
le   
2022 - 2023 Provider:   
Doreen Cabrera CNP  
   
                                        Start: 2021   take 3 capsules by m  
outh once   
daily                                   prazosin (MINIPRESS) 1 MG capsule   
Take 3 capsules by mouth nightly   
90 capsule 0 2021 Active  
   
                                                    Start: 2021  
End: 10-                                     Prazosin HCl 1 MG Oral Capsu  
le   
06/10/2021 - 2021 Provider:   
Doreen Cabrera CNP  
  
  
  
                                                    promethazine   
hydrochloride 12.5 mg   
oral tablet  
(20 sources)              Phenothiazine             Start: 2022  
End: 2023                                     Promethazine HCl 12.5   
MG Oral Tablet   
2022 -   
2023 Provider:   
Julieth Pisano CNP  
   
                                                    QUEtiapine 50 mg oral   
tablet  
(6 sources)               Atypical Antipsychotic    Start: 2024  
End: 11-                                     SEROquel 50 MG Oral   
Tablet 2024 -   
11/15/2024 Provider:   
Doreen Cabrera CNP  
   
                                                    sertraline 50 mg oral   
tablet  
(20 sources)                            Serotonin Reuptake   
Inhibitor                               Start: 2024  
End: 2024                                     Sertraline HCl 50 MG   
Oral Tablet   
2024 -   
2024 Provider:   
Doreen Cabrera CNP  
  
  
  
                                                    Start: 2020  
End: 2021                                     Zoloft 50 MG Oral Tablet  - 2021 Provider:  
  
  
  
                                                    traZODone hydrochloride   
100 mg oral tablet  
(20 sources)                            Serotonin Reuptake   
Inhibitor                               Start: 2023  
End: 2024                                     traZODone HCl 100 MG   
Oral Tablet   
2024 -   
2024 Provider:   
Doreen Cabrera CNP  
  
  
  
                                                    Start: 2021  
End: 10-                                     traZODone HCl 50 MG Oral Tab  
let 2021 - 2021   
Provider:  
  
  
  
                                                    ziprasidone 80 mg oral   
capsule  
(20 sources)              Atypical Antipsychotic    Start: 2021  
End: 10-                                     Geodon 80 MG Oral   
Capsule 2021 -   
2021 Provider:  
  
  
  
                                                    Start: 2021  
End: 2021                                     Geodon 20 MG Oral Capsule   
2021 - 2021 Provider:  
   
                                                            take 4 capsules by m  
outh once   
daily                                   ziprasidone (GEODON) 20 MG capsule   
Take 80 mg by mouth nightly 0   
Active  
  
  
  
Problems  
Active Problems  
  
  
                                                    Problem   
Classification  Problem         Date            Documented Date Episodic/Chronic  
   
                                                    Anxiety disorders  
(20 sources)                            Posttraumatic stress   
disorder;   
Translations:   
[Post-traumatic stress   
disorder, unspecified]                  Onset:   
2021                                          Chronic  
   
                                                    Attention-deficit,   
conduct, and   
disruptive behavior   
disorders  
(20 sources)                            Attention deficit   
hyperactivity   
disorder;   
Translations:   
[Attention-deficit   
hyperactivity   
disorder, unspecified   
type]                                   Onset:   
2024                Chronic  
   
                                                    Attention-deficit,   
conduct, and   
disruptive behavior   
disorders  
(13 sources)                            Undifferentiated   
attention deficit   
disorder;   
Translations:   
[Attention deficit   
disorder with   
hyperactivity]                          Onset:   
2024                                          Chronic  
   
                                                    Deficiency and other   
anemia  
(4 sources)                             Iron deficiency   
anemia; Translations:   
[Iron deficiency   
anemia, unspecified]                    Onset:   
10-                                          Episodic  
   
                                                    Diabetes mellitus   
without complication  
(20 sources)                            Type 2 diabetes   
mellitus without   
complication;   
Translations: [Type 2   
diabetes mellitus   
without complications]                  Onset:   
2024                Chronic  
   
                                                    Diseases of white   
blood cells  
(1 source)                              Leukocytosis;   
Translations:   
[Elevated white blood   
cell count,   
unspecified]                            2020          Chronic  
   
                                                    Disorders of teeth   
and jaw  
(6 sources)                             Dental caries;   
Translations: [Dental   
caries, unspecified]                    Onset:   
2024                                          Episodic  
   
                                                    Essential   
hypertension  
(18 sources)                            Hypertensive disorder;   
Translations:   
[Unspecified essential   
hypertension]                           Onset:   
2021                                          Chronic  
   
                                                    Genitourinary   
symptoms and   
ill-defined   
conditions  
(1 source)                              Dysuria; Translations:   
[Dysuria]                               2020          Episodic  
   
                                                    Immunizations and   
screening for   
infectious disease  
(20 sources)                            Contact with and   
(suspected) exposure   
to other viral   
communicable diseases;   
Translations: [HIV   
screening]                              Onset:   
08-                                          Episodic  
   
                                                    Impulse control   
disorders, NEC  
(1 source)                              Homicidal thoughts;   
Translations:   
[Homicidal ideations]                                         Episodic  
   
                                                    Menstrual disorders  
(16 sources)                            Dysmenorrhea;   
Translations:   
[Dysmenorrhea,   
unspecified]                            Onset:   
2024                Chronic  
   
                                                    Mood disorders  
(20 sources)                            Depressive disorder;   
Translations: [Major   
depressive disorder,   
single episode,   
unspecified]                            Onset:   
2021                                          Chronic  
   
                                                    Nonspecific chest   
pain  
(3 sources)                             Other chest pain;   
Translations: [Chest   
pain]                                   Onset:   
2024                                          Episodic  
   
                                                    Nutritional   
deficiencies  
(1 source)                              Vitamin D deficiency;   
Translations: [Vitamin   
D deficiency,   
unspecified]                            2020          Chronic  
   
                                                    Other ear and sense   
organ disorders  
(7 sources)                             Otitis externa;   
Translations:   
[Infective otitis   
externa, unspecified]                   Onset:   
2021                                          Chronic  
   
                                                    Other lower   
respiratory disease  
(6 sources)                             Cough; Translations:   
[Cough]                                 Onset:   
2022                                          Episodic  
   
                                                    Other nutritional;   
endocrine; and   
metabolic disorders  
(20 sources)                            Finding of body mass   
index; Translations:   
[Body mass index   
(observable entity)]                    Onset:   
2021                                          Chronic  
   
                                                    Other nutritional;   
endocrine; and   
metabolic disorders  
(20 sources)                            Morbid obesity;   
Translations: [Morbid   
obesity]                                Onset:   
2021                                          Chronic  
   
                                                    Personality   
disorders  
(20 sources)                            Borderline personality   
disorder;   
Translations:   
[Borderline   
personality disorder]                   Onset:   
2021  
Resolved:   
2024                                          Chronic  
   
                                                    Substance-related   
disorders  
(20 sources)                            Tobacco dependence   
syndrome;   
Translations:   
[Nicotine dependence,   
unspecified,   
uncomplicated]                          Onset:   
2021  
Resolved:   
2023                                          Chronic  
   
                                                    Suicide and   
intentional   
self-inflicted   
injury  
(1 source)                              Suicidal thoughts;   
Translations:   
[Suicidal ideations]                     2020          Episodic  
  
  
Past or Other Problems  
  
  
                      Problem Classification Problem    Date       Documented Da  
te Episodic/Chronic  
   
                                                    Cardiac dysrhythmias  
(10 sources)                            Tachycardia;   
Translations:   
[Tachycardia,   
unspecified]                            Onset:   
2021                                          Episodic  
   
                                                    Conditions associated   
with dizziness or   
vertigo  
(5 sources)                             Dizziness;   
Translations:   
[Dizziness and   
giddiness]                              Onset:   
2017                Episodic  
   
                                                    Deficiency and other   
anemia  
(10 sources)                            Anemia;   
Translations:   
[Anemia,   
unspecified]                            Onset:   
2024                                          Episodic  
   
                                                    Other screening for   
suspected conditions   
(not mental disorders   
or infectious disease)  
(20 sources)                            Encounter for   
screening for   
diabetes mellitus;   
Translations:   
[Diabetes Risk Test   
Score]                                  Onset:   
2021                                          Episodic  
   
                                                    Otitis media and   
related conditions  
(5 sources)                             Acute suppurative   
otitis media   
without spontaneous   
rupture of ear   
drum; Translations:   
[Acute suppurative   
otitis media   
without spontaneous   
rupture of ear   
drum, right ear]                        Onset:   
2017                Episodic  
  
  
  
Results  
  
  
                          Test Name    Value        Interpretation Reference   
Range                                   Facility  
   
                                                    [facility] 10-   
   
                                        L                   Specimen: [id number]   
Received: 10/28/   
Status: KRISHAN Hernandez Num:   
[id number]  
Spec Type: Surgical Subm   
Dr: Braxton Patterson  
  
Tissues: A Endometrium -   
Curettings (ENDOMETRIAL   
CURETTINGS)  
Procedures: HE/8,   
Gross/Micro L4  
  
  
Age/  
Patient Birth Sex   
Location Account   
Attending Physician  
  
  
Linnette Beckham 24/   
LABELL [id number] Braxton Patterson  
  
  
  
SPEC NUM: [id number] RECD:   
10/28/ STATUS:   
KRISHAN HERNANDEZ NUM: [id number]  
BISI: 10/25/ SUBM   
DR: Braxton Patterson  
  
ENTERED: 10/28/   
[facility] DR: [place],Lab  
  
SPEC TYPE: Surgical DEPT:   
LANDAVERDE CARYL  
ENTERED BY: [id number] RECV   
BY: [id number]  
  
ORDERED: HE/8,   
Gross/Micro L4  
ORDERED: HE/8,   
Gross/Micro L4  
  
Pathological Diagnosis  
  
Endometrium, curettage:  
- Benign endometrium with   
inactive endometrium and   
decidualized stroma   
consistent with  
exogenous progesterone  
hormonal effect.  
- Fragments of benign   
endocervical mucosa.  
- No evidence of   
hyperplasia or malignancy   
identified.  
  
Clinical Information  
  
Dysfunctional uterine   
bleeding  
  
Gross Description  
  
The specimen is received   
in formalin with the   
patient's name and   
 endometrial curettings   
and consists of multiple   
tan hemorrhagic tissue   
fragments measuring 5.0 x   
5.0 x 1.0 cm in  
aggregate. The specimen   
is entirely submitted in   
cassettes A1 to A4.  
  
Microscopic Description  
  
Microscopic examination   
is performed.  
  
  
  
  
  
-------------------------  
-----------------  
Specimen: [id number]   
Received: 10/28/   
Status: KRISHAN Hernandez Num:   
[id number]  
Spec Type: Surgical Subm   
Dr: Braxton Patterson  
  
Tissues: A Endometrium -   
Curettings (ENDOMETRIAL   
CURETTINGS)  
Procedures: Carlos/Shobha L4  
-------------------------  
-----------------  
Patient: Linnette Beckham   
[id number] (Continued)  
-------------------------  
-----------------  
  
  
Specimen: [id number]   
Received: 10/28/   
(Continued)  
  
  
Signed   
__________(signature on   
file)___________ Presley Ahumada MD 10/30/24 1120  
  
  
-------------------------  
-----------------  
Specimen: [id number]   
Received: 10/28/   
Status: KRISHAN Hernandez Num:   
[id number]  
Spec Type: Surgical Subm   
Dr: Braxton Patterson  
  
Tissues: A Endometrium -   
Curettings (ENDOMETRIAL   
CURETTINGS)  
Procedures: ,   
Gross/Micro L4  
-------------------------  
-----------------  
Patient: Linnette Beckham   
[id number] (Continued)  
-------------------------  
-----------------  
  
  
Specimen: [id number]   
Received: 10/28/   
(Continued)  
  
  
CPT Codes  
  
92750  
-------------------------  
-----------------  
  
  
  
  
  
  
  
  
  
  
  
  
  
  
  
  
  
  
  
  
  
  
  
  
  
  
  
  
  
  
  
  
  
  
  
  
  
  
  
-------------------------  
-----------------  
Specimen: [id number]   
Received: 10/28/   
Status: KRISHAN Hernandez Num:   
[id number]  
Spec Type: Surgical Subm   
Dr: Braxton Patterson  
  
Tissues: A Endometrium -   
Curettings (ENDOMETRIAL   
CURETTINGS)  
Procedures: HE/Jeison,   
Carlos/Shobha L4  
-------------------------  
-----------------  
Patient: RosannaLinnette zelaya   
[id number] (Continued)  
-------------------------  
-----------------  
  
  
Signed   
__________(signature on   
file)___________ Presley Ahumada MD 10/30/24 1120 Normal                                  The   
[facility]   
Physician   
Group  
   
                                                    Laboratory - Chemistry and C  
hemistry - challengeon 2024   
   
                      Ferritin [Mass/Vol] 118 ng/mL             ( )  [facility]  
[facility]  
   
                      Free T3 [Mass/Vol] 3.2 pg/mL             (2.0-4.4 ) [facility]  
   
                          Free T4 [Mass/Vol] 1.13 ng/dL                (0.82-1.7  
7   
)                                       [facility]  
   
                      Iron [Mass/Vol] 52 ug/dL              ( )  [facility]  
   
                                                    Iron binding capacity   
[Mass/Vol]      396 ug/dL                       (250-450 )      [facility]  
   
                                                    Iron binding   
capacity.unsaturated   
[Mass/Vol]      344 ug/dL                       (131-425 )      [facility]  
   
                                                    Iron saturation [Mass   
fraction]       13 %            Low             (15-55 )        [facility]  
   
                          TSH Qn       2.930 uIU/mL              (0.450-4.50  
0 )                                     [facility]  
   
                                                    Laboratory - Hematology and   
Cell countson 2024   
   
                      Basophils (Bld) [#/Vol] 0.1 10*3/uL            (0.0-0.2 )   
[facility]   
[facility]  
   
                          Basophils/100 WBC (Bld) 0 %                       (Not  
 Estab.   
)                                       [facility]   
[facility]  
   
                                                    Eosinophils (Bld)   
[#/Vol]         0.2 10*3/uL                     (0.0-0.4 )      [facility]  
   
                                                    Eosinophils/100 WBC   
(Bld)               1 %                                     (Not Estab.   
)                                       [facility]  
   
                                                    Erythrocyte   
distribution width   
(RBC) [Ratio]       15.7 %              High                (11.7-15.4   
)                                       [facility]   
[facility]  
   
                                                    Hematocrit (Bld)   
[Volume fraction]   41.3 %                                  (34.0-46.6   
)                                       [facility]   
[facility]  
   
                                                    Hemoglobin (Bld)   
[Mass/Vol]          12.9 g/dL                               (11.1-15.9   
)                                       [facility]   
[facility]  
   
                                                    Immature granulocytes   
(Bld) [#/Vol]   0.1 10*3/uL                     (0.0-0.1 )      [facility]   
[facility]  
   
                                                    Immature   
granulocytes/100 WBC   
(Bld)               1 %                                     (Not Estab.   
)                                       [facility]  
   
                                                    Lymphocytes (Bld)   
[#/Vol]         2.4 10*3/uL                     (0.7-3.1 )      [facility]   
[facility]  
   
                                                    Lymphocytes/100 WBC   
(Bld)               18 %                                    (Not Estab.   
)                                       [facility]   
[facility]  
   
                                                    MCH (RBC) [Entitic   
mass]               24.3 pg             Low                 (26.6-33.0   
)                                       [facility]   
[facility]  
   
                          MCHC (RBC) [Mass/Vol] 31.2 g/dL    Low          (31.5-  
35.7   
)                                       [facility]   
[facility]  
   
                      MCV (RBC) [Entitic vol] 78 fL      Low        (79-97 )   [facility]  
[facility]   
[facility]   
[facility]  
   
                      Monocytes (Bld) [#/Vol] 0.6 10*3/uL            (0.1-0.9 )   
[facility]   
[facility]  
   
                          Monocytes/100 WBC (Bld) 5 %                       (Not  
 Estab.   
)                                       [facility]  
   
                                                    Morphology Gagandeep (Bld)   
[Interp]        NP                                              [facility]  
   
                                                    Neutrophils (Bld)   
[#/Vol]         9.7 10*3/uL     High            (1.4-7.0 )      [facility]   
[facility]  
   
                                                    Neutrophils/100 WBC   
(Bld)               75 %                                    (Not Estab.   
)                                       [facility]  
   
                                                    Nucleated RBC/100 WBC   
(Bld) [Ratio]   NP                                              [facility]  
   
                      Platelets (Bld) [#/Vol] 308 10*3/uL            (150-450 )   
[facility]  
   
                          RBC (Bld) [#/Vol] 5.31 10*6/uL High         (3.77-5.28  
   
)                                       [facility]  
   
                      WBC (Bld) [#/Vol] 13.1 10*3/uL High       (3.4-10.8 ) [facility]  
   
                                                    Laboratory - Serology - non-  
microon 2024   
   
                      Thyroglobulin Ab Qn [IU]/mL               (0.0-0.9 ) [facility]  
   
                                        Comment on above:   Note: Thyroglobulin   
Antibody measured by [facility]   
[employer] .It should be noted that the presence of   
thyroglobulinantibodies may not be pathogenic nor diagnostic,   
especiallyat very low levels. The assay  has found   
thatfour percent of individuals without evidence of   
thyroiddisease or autoimmunity will have positive TgAb levels   
upto 4 IU/mL.   
   
                      TPO Ab Qn  14 [IU]/mL            (0-34 )    [facility]  
   
                                                    No Panel Informationon    
   
                      Immature Cells NP                               [facility]  
   
                      Reported Physicians See Note                         [facility]  
   
                                        Comment on above:   Note: Reported Physi  
cians:Ordering: Doreen Cabrera   
   
                                                    Troponin I.cardiac High sens  
itivity method [Mass/Vol]on 2024   
   
                                                    1 HOUR TROP I, HIGH   
SENSITIVITY     <2              Normal          <16             [facility]  
   
                                        Comment on above:   Performed By: #### 8  
[id number] ####  
[facility] ([id number])  
[address] [place] [address], [place] [address]   
   
                                                    BASIC METABOLIC PANLon    
   
                      Anion gap [Moles/Vol] 7 mmol/L   Normal     5-15       [facility]  
[facility]  
   
                                        Comment on above:   Performed By: #### C  
BCA, BMP, 69169-1, 54543-7, 29706-4 ####  
[facility] ([id number])  
[address] [place] [address], [place] [address]   
   
                      Calcium [Mass/Vol] 8.9 mg/dL  Normal     8.5-10.5   [facility]  
   
                                        Comment on above:   Performed By: #### C  
BCA, BMP, 05391-3, 91390-9, 01030-5 ####  
[facility] ([id number])  
[address] [place] [address]   
   
                      Chloride [Moles/Vol] 101 mmol/L Normal          [facility]  
   
                                        Comment on above:   Performed By: #### C  
BCA, BMP, 10470-9, 55845-1, 97191-2 ####  
[facility] ([id number])  
[address] [place] [address]   
   
                      CO2 [Moles/Vol] 28 mmol/L  Normal     22-32      [facility]  
   
                                        Comment on above:   Performed By: #### C  
BCA, BMP, 78342-9, 39515-6, 04506-9 ####  
[facility] ([id number])  
[address] [place] [address]   
   
                      Creatinine [Mass/Vol] 0.91 mg/dL Normal     0.40-1.00  [facility]  
   
                                        Comment on above:   Result Comment: METH  
OD TRACEABLE TO IDMS STANDARD   
   
                                                            Performed By: #### C  
REBECCA, BMP, 76966-9, 40269-7, 38848-0 ####  
[facility] ([id number])  
[address] [place] [address]   
   
                                                    eGFR (CKD-EPI) NON-RACE   
DEPENDENT       >90             Normal          >59             [facility]  
   
                                        Comment on above:   Result Comment:  
Reported eGFR is based on the  
CKD-EPI 2021 equation that does  
not use a race coefficient.   
   
                                                            Performed By: #### C  
BCA, BMP, 59489-3, 86792-0, 74093-7 ####  
[facility] ([id number])  
[address] [place] [address]   
   
                      Glucose [Mass/Vol] 107 mg/dL  High       65-99      [facility]  
   
                                        Comment on above:   Performed By: #### C  
BCA, BMP, 33026-4, 32938-3, 56106-0 ####  
[facility] ([id number])  
[address] [place] [address]   
   
                      Potassium [Moles/Vol] 3.5 mmol/L Normal     3.5-5.0    [facility]  
   
                                        Comment on above:   Performed By: #### C  
REBECCA, BMP, , 53945-7, 49606-3 ####  
[facility] ([id number])  
[address] [place] [address]   
   
                      Sodium [Moles/Vol] 136 mmol/L Normal     134-146    [facility]  
   
                                        Comment on above:   Performed By: #### C  
REBECCA, BMP, , 81652-8, 20212-4 ####  
[facility] ([id number])  
[address] [place] [address]   
   
                                                    Urea nitrogen   
[Mass/Vol]      10 mg/dL        Normal          5-23            [facility]  
   
                                        Comment on above:   Performed By: #### C  
REBECCA, BMP, , 67423-1, 55193-1 ####  
[facility] ([id number])  
[address] [place] [address]   
   
                                                    CBC AND AUTO DIFFon 20  
24   
   
                      ABSOLUTE BASOPHIL 0.2 X10E9/L Normal     0.0-0.2    [facility]  
   
                                        Comment on above:   Performed By: #### C  
REBECCA, BMP, , 78288-3, 46643-8 ####  
[facility] ([id number])  
[address] [place] [address]   
   
                      ABSOLUTE NEUTROPHIL 10.7 X10E9/L High       1.5-6.6    [facility]  
   
                                        Comment on above:   Performed By: #### C  
BCA, BMP, , 15400-9, 08771-1 ####  
[facility] ([id number])  
[address] [place] [address]   
   
                      Basophils/100 WBC (Bld) 1.1 %      Normal                [facility]  
   
                                        Comment on above:   Performed By: #### C  
BCA, BMP, 61008-8, 48899-4, 64757-1 ####  
[facility] ([id number])  
[address] [place] [address]   
   
                                                    Eosinophils (Bld)   
[#/Vol]         0.2 10*3/uL     Normal          0.0-0.4         [facility]  
   
                                        Comment on above:   Performed By: #### C  
BCA, BMP, , 94695-7, 28213-7 ####  
[facility] ([id number])  
[address] [place] [address]   
   
                                                    Eosinophils/100 WBC   
(Bld)           1.3 %           Normal                          [facility]  
   
                                        Comment on above:   Performed By: #### C  
BCA, BMP, , 11498-4, 46875-1 ####  
[facility] ([id number])  
[address] [place] [address]   
   
                                                    Erythrocyte   
distribution width   
(RBC) [Ratio]   19.4 %          High            11.5-15.0       [facility]  
   
                                        Comment on above:   Performed By: #### C  
BCA, BMP, , 51281-6, 40246-8 ####  
[facility] ([id number])  
[address] [place] [address]   
   
                                                    Hematocrit (Bld)   
[Volume fraction] 33.5 %          Low             35-47           [facility]  
   
                                        Comment on above:   Performed By: #### C  
BCA, BMP, , 69281-6, 87129-2 ####  
[facility] ([id number])  
[address] [place] [address]   
   
                                                    Hemoglobin (Bld)   
[Mass/Vol]      10.6 g/dL       Low             11.7-15.5       [facility]  
   
                                        Comment on above:   Performed By: #### C  
BCA, BMP, , 22142-5, 66278-7 ####  
[facility] ([id number])  
[address] [place] [address]   
   
                                                    Lymphocytes (Bld)   
[#/Vol]         2.4 10*3/uL     Normal          1.0-3.5         [facility]  
   
                                        Comment on above:   Performed By: #### C  
REBECCA, BMP, , 41862-3, 12257-4 ####  
[facility] ([id number])  
[address] [place] [address]   
   
                                                    Lymphocytes/100 WBC   
(Bld)           17.2 %          Normal                          [facility]  
   
                                        Comment on above:   Performed By: #### C  
REBECCA, BMP, , 08702-9, 46401-0 ####  
[facility] ([id number])  
[address] [place] [address]   
   
                                                    MCH (RBC) [Entitic   
mass]           21.7 pg         Low             27-34           [facility]  
   
                                        Comment on above:   Performed By: #### C  
REBECCA, BMP, , 16822-0, 50817-7 ####  
[facility] ([id number])  
[address] [place] [address]   
   
                      MCHC (RBC) [Mass/Vol] 31.6 g/dL  Low        32-36      Pro  
[facility]  
   
                                        Comment on above:   Performed By: #### C  
REBECCA, BMP, , 35816-4, 80636-8 ####  
[facility] ([id number])  
[address] [place] [address]   
   
                      MCV (RBC) [Entitic vol] 69 fL      Low             P  
[facility]  
   
                                        Comment on above:   Performed By: #### C  
REBECCA, BMP, , 28850-1, 00638-4 ####  
[facility] ([id number])  
[address] [place] [address]   
   
                      Monocytes (Bld) [#/Vol] 0.7 10*3/uL Normal     0-0.9        
[facility]  
   
                                        Comment on above:   Performed By: #### C  
BCA, BMP, 39283-8, 26684-7, 62983-4 ####  
[facility] ([id number])  
[address] [place] [address]   
   
                      Monocytes/100 WBC (Bld) 4.8 %      Normal                [facility]  
   
                                        Comment on above:   Performed By: #### C  
BCA, BMP, 32291-8, 14725-6, 25266-7 ####  
[facility] ([id number])  
[address] [place] [address]   
   
                                                    Neutrophils/100 WBC   
(Bld)           75.6 %          Normal                          [facility]  
   
                                        Comment on above:   Performed By: #### C  
BCA, BMP, 06197-8, 27824-0, 29332-9 ####  
[facility] ([id number])  
[address] [place] [address]   
   
                                                    Platelet mean volume   
(Bld) [Entitic vol] 6.6 fL          Low             7-12            [facility]  
   
                                        Comment on above:   Performed By: #### C  
BCA, BMP, 74137-0, 49134-7, 61368-6 ####  
[facility] ([id number])  
[address] [place] [address]   
   
                      Platelets (Bld) [#/Vol] 333 10*3/uL Normal     150-450      
[facility]  
   
                                        Comment on above:   Performed By: #### C  
BCA, BMP, 98224-9, 32438-6, 41226-0 ####  
[facility] ([id number])  
[address] [place] [address]   
   
                      RBC COUNT  4.88 X10E12/L Normal     3.80-5.20  [facility]  
   
                                        Comment on above:   Performed By: #### DAVID  
BCA, BMP, 80638-1, 59091-4, 56506-3 ####  
[facility] ([id number])  
[address] [place] [address]   
   
                                                    RBC morphology finding   
Nom (Bld)       REVIEWED        Normal                          [facility]  
   
                                        Comment on above:   Performed By: #### C  
REBECCA BISHOP, , 31728-0, 47429-5 ####  
[facility] ([id number])  
[address] [place] [address]   
   
                      WBC (Bld) [#/Vol] 14.1 10*3/uL High       4.0-11.0   [facility]  
[facility]  
   
                                        Comment on above:   Performed By: #### C  
REBECCA, BISHOP, , 44452-8, 08587-8 ####  
[facility] ([id number])  
[address] [place] [address]   
   
                                                    Fibrin D-dimer DDU (PPP) [Ma  
ss/Vol]on 2024   
   
                      D DIMER    <150       Normal     <255       [facility]  
   
                                        Comment on above:   Result Comment:  
Results <255 ng/mL DDU: The presence of a  
VTE can safely be excluded with a negative  
D-Dimer result and Wells score. A negative  
result doesn't exclude the possibility of DIC.  
The test be repeated along with other  
diagnostic tests if the patient's symptoms  
persist or worsen.  
[email or web address]/[email or web address]  
[email or web address]   
   
                                                            Performed By: #### C  
REBECCA, BISHOP, , 20286-2, 75404-8 ####  
[facility] ([id number])  
[address] [place] [address]   
   
                                                    MAGNESIUMon 2024   
   
                      Magnesium [Mass/Vol] 1.8 mg/dL  Normal     1.8-2.6    [facility]  
   
                                        Comment on above:   Performed By: #### C  
REBECCA, BISHOP, , 47432-9, 88469-8 ####  
[facility] ([id number])  
[address] [place] [address]   
   
                                                    Troponin I.cardiac High sens  
itivity method [Mass/Vol]on 2024   
   
                                                    TROPONIN I, HIGH   
SENSITIVITY     <2              Normal          <16             [facility]  
   
                                        Comment on above:   Performed By: #### C  
BISHOP FERNANDEZ, 22692-3, 69139-4, 26494-1 ####  
[facility] ([id number])  
[address], [address] [place]  
[place] [address]   
   
                                                    XR CHEST 2 VWSon 2024   
   
                                        XR CHEST 2 VWS      XR CHEST 2 VWS  
XR CHEST 2 VWS  
History: . chest   
discomfort.  
Comparison: 2020  
Impression:  
No acute pulmonary   
process. No pneumothorax   
or pleural effusion.  
Nonenlarged heart.  
Workstation:[id number]  
Finalized by Dejon Khan MD on 2024   
9:49 PM             Normal                                  [facility]  
   
                                                    Chlamydia/GC DNA, Uron 10-24  
-2022   
   
                      Chlamydia Probe, Ur Negative   Normal     NEG        [facility]  
   
                                        Comment on above:   Result Comment: CHLA  
MYDIA TRACHOMATIS DNA not detected by   
nucleic acid amplification.  
This test is intended for medical purposes only and is not   
valid for the  
evaluation of suspected sexual abuse or for other forensic   
purposes.  
In certain contexts, culture may be required to meet   
applicable laws and  
regulations for diagnosis of C. trachomatis and N. gonorrhoeae   
infections.  
Per 2014 CDC recommendations, this test does not include   
confirmation of  
positive results by an alternative nucleic acid target.   
   
                                                            Performed By: #### T  
RCCHRIS, UC ####  
[facility]  
[address] [place] [address] [id number] [phone]  
: Carson Santizo MD   
   
                      Gonorrhea Probe, Ur Negative   Normal     NEG        [facility]  
   
                                        Comment on above:   Result Comment: NEIS  
SERIA GONORRHOEAE DNA not detected by   
nucleic acid amplification.  
This test is intended for medical purposes only and is not   
valid for the  
evaluation of suspected sexual abuse or for other forensic   
purposes.  
In certain contexts, culture may be required to meet   
applicable laws and  
regulations for diagnosis of C. trachomatis and N. gonorrhoeae   
infections.  
Per 2014 CDC recommendations, this test does not include   
confirmation of  
positive results by an alternative nucleic acid target.   
   
                                                            Performed By: #### T  
RCMOROJELIO, UC ####  
[facility]  
[address] [place] [address]  
[phone]  
: Carson Santizo MD   
   
                                                    Trich Vag, Molecularon 10-22  
-2022   
   
                      Trich Vag, Molecular Negative   Normal     NEG        [facility]  
   
                                        Comment on above:   Result Comment: T. v  
aginalis DNA not detected  
Results should be interpreted in conjunction with other   
clinical data.  
This test is intended for medical purposes only and is not   
valid for the  
evaluation of suspected sexual abuse or for other forensic   
purposes.  
This test has not been evaluated in pregnant women or in   
patients less than 16  
years of age.   
   
                                                            Performed By: #### T  
RCMOL, UCGP ####  
[address] [address]  
[phone]  
: Carson Santizo MD   
   
                      Source:    .URINE     Normal                [facility]  
   
                                        Comment on above:   Performed By: #### T  
RCMOL, UCGP ####  
[address] [address]  
[phone]  
: Carson Santizo MD   
   
                                                    Acetaminophenon 2021   
   
                                                    Acetaminophen   
[Mass/Vol]      ug/mL           Low             10-30           [facility]  
   
                                        Comment on above:   Performed By: #### C  
OVRB ####  
[facility]  
[address] [place] [address] [phone]  
: Haresh Zimmer MD   
   
                                                    Acetaminophen LevelOrdered B  
y: Seth Rahman on 2021   
   
                          Acetaminophen Level <5           Low          10 - 30   
ug/mL                                   [facility]   
Phone:   
[phone]  
   
                                                    Interpretation and   
review of laboratory   
results         Abnormal                                        [facility]   
Phone:   
[phone]  
   
                                                                  [facility]   
Phone:   
[phone]  
   
                                                    CBC Auto DifferentialOrdered  
 By: Seth Rahman on 2021   
   
                      Absolute Eos # 0.62       High                  [facility]   
Phone:   
[phone]  
   
                                                    Absolute Immature   
Granulocyte     0.07                                            [facility]   
Phone:   
[phone]  
   
                      Absolute Lymph # 3.20                             [facility]   
Phone:   
[phone]  
   
                      Absolute Mono # 0.89                             [facility]   
Phone:   
[phone]  
   
                      Basophils (Bld) [#/Vol] 0.10 10*3/uL                        
 [facility]   
Phone:   
[phone]  
   
                      Basophils/100 WBC (Bld) 1 %                   0 - 2 %    M  
[facility]   
Phone:   
[phone]  
   
                      Differential Type NOT REPORTED                       [facility]   
Phone:   
[phone]  
   
                                                    Eosinophils/100 WBC   
(Bld)           5 %             High            1 - 4 %         [facility]   
Phone:   
[phone]  
   
                                                    Hematocrit (Bld)   
[Volume fraction]   43.4 %                                  36.3 - 47.1   
%                                       [facility]   
Phone:   
[phone]  
   
                                                    Hemoglobin.gastrointest  
inal spec 1 Ql (Stl) 14.6 g/dL                               11.9 - 15.1   
g/dL                                    [facility]   
Phone:   
[phone]  
   
                                                    Immature   
granulocytes/100 WBC   
(Bld)           1 %             High            0               [facility]   
Phone:   
[phone]  
   
                                                    Interpretation and   
review of laboratory   
results         Abnormal                                        [facility]   
Phone:   
[phone]  
   
                                                    Lymphocytes/100 WBC   
(Bld)           26 %                            24 - 43 %       [facility]   
Phone:   
[phone]  
   
                                                    MCH (RBC) [Entitic   
mass]               28.3 pg                                 25.2 - 33.5   
pg                                      [facility]   
Phone:   
[phone]  
   
                          MCHC (RBC) [Mass/Vol] 33.6 g/dL                 28.4 -  
 34.8   
g/dL                                    [facility]   
Phone:   
[phone]  
   
                          MCV (RBC) [Entitic vol] 84.3 fL                   82.6  
 -   
102.9 fL                                [facility]   
Phone:   
[phone]  
   
                      Monocytes/100 WBC (Bld) 7 %                   3 - 12 %   M  
[facility]   
Phone:   
[phone]  
   
                          NRBC Automated 0.0                       0.0 per 100   
WBC                                     [facility]   
Phone:   
[phone]  
   
                                                    Platelet distribution   
width (Bld) [Ratio] 12.3 %                                  11.8 - 14.4   
%                                       [facility]   
Phone:   
[phone]  
   
                      Platelet Estimate NOT REPORTED                       [facility]   
Phone:   
[phone]  
   
                                                    Platelet mean volume   
(Bld) [Entitic vol] 8.7 fL                                  8.1 - 13.5   
fL                                      [facility]   
Phone:   
[phone]  
   
                      Platelets (Bld) [#/Vol] 273 10*3/uL                         
[facility]   
Phone:   
[phone]  
   
                          RBC (Bld) [#/Vol] 5.15 10*6/uL High         3.95 - 5.1  
1   
m/uL                                    [facility]   
Phone:   
[phone]  
   
                      RBC (Bld) [#/Vol] NOT REPORTED                       [facility]   
Phone:   
[phone]  
   
                                                    Segmented   
neutrophils/100 WBC   
(Bld)           60 %                            36 - 65 %       [facility]   
Phone:   
[phone]  
   
                      Segs Absolute 7.61                             [facility]   
Phone:   
[phone]  
   
                      WBC (Bld) [#/Vol] 12.5 10*3/uL High                  [facility]   
Phone:   
[phone]  
   
                      WBC (Bld) [#/Vol] NOT REPORTED                       [facility]   
Phone:   
[phone]  
   
                                                                  [facility]   
Phone:   
[phone]  
   
                                                    CBC with Diffon 2021   
   
                      Abs. Basophil 0.10 k/uL  Normal     0.00-0.20  [facility]  
   
                                        Comment on above:   Performed By: #### D  
AU ####  
[facility]  
[address] [place] [address] [place] [address] [phone]  
: Haresh Zimmer MD   
   
                      Abs.Imm.Granulocyte 0.07 k/uL  Normal     0.00-0.30  [facility]  
   
                                        Comment on above:   Performed By: #### D  
AU ####  
[facility]  
[address] [place] [address] [place] [address] [phone]  
: Haresh Zimmer MD   
   
                      Abs.Neutrophil (Seg) 7.61 k/uL  Normal     1.50-8.10  [facility]  
   
                                        Comment on above:   Performed By: #### D  
AU ####  
[facility]  
[address] [place], [place] [address] [phone]  
: Haresh Zimmer MD   
   
                      Basophils/100 WBC (Bld) 1 %        Normal     0-2        M  
[facility]  
   
                                        Comment on above:   Performed By: #### D  
AU ####  
[facility]  
[address] [place] [address] [address] [place], [place] 3859483 [phone]  
: Haresh Zimmer MD   
   
                                                    Eosinophils (Bld)   
[#/Vol]         0.62 10*3/uL    High            0.00-0.44       [facility]  
   
                                        Comment on above:   Performed By: #### D  
AU ####  
[address] [place], [place] [address] [phone]  
: Haresh Zimmer MD   
   
                                                    Eosinophils/100 WBC   
(Bld)           5 %             High            1-4             [facility]  
   
                                        Comment on above:   Performed By: #### D  
AU ####  
[address] [place], [place] [address] [phone]  
: Haresh Zimmer MD   
   
                                                    Erythrocyte   
distribution width   
(RBC) [Ratio]   12.3 %          Normal          11.8-14.4       [facility]  
   
                                        Comment on above:   Performed By: #### D  
AU ####  
[address] [place], [place] 7[address] [phone]  
: Haresh Zimmer MD   
   
                                                    Hematocrit (Bld)   
[Volume fraction] 43.4 %          Normal          36.3-47.1       [facility]  
   
                                        Comment on above:   Performed By: #### D  
AU ####  
[address] [place], [place] 0[address]483 [phone]  
: Haresh Zimmer MD   
   
                                                    Hemoglobin (Bld)   
[Mass/Vol]      14.6 g/dL       Normal          11.9-15.1       [facility]  
   
                                        Comment on above:   Performed By: #### D  
AU ####  
[address] [place], [place] 47[address]183 [phone]  
: Haresh Zimmer MD   
   
                                                    Immature   
granulocytes/100 WBC   
(Bld)           1 %             High            0               [facility]  
   
                                        Comment on above:   Performed By: #### D  
AU ####  
[facility]  
[address] [place] [address] [phone]  
: Haresh Zimmer MD   
   
                                                    Lymphocytes (Bld)   
[#/Vol]         3.20 10*3/uL    Normal          1.10-3.70       [facility]  
   
                                        Comment on above:   Performed By: #### D  
AU ####  
[facility]  
[address] [place] [address] [place] [address] [phone]  
: Haresh Zimmer MD   
   
                                                    Lymphocytes/100 WBC   
(Bld)           26 %            Normal          24-43           [facility]  
   
                                        Comment on above:   Performed By: #### D  
AU ####  
[facility]  
[address] [place] [address] [place] [place], [place] [address] [phone]  
: Haresh Zimmer MD   
   
                                                    MCH (RBC) [Entitic   
mass]           28.3 pg         Normal          25.2-33.5       [facility]  
   
                                        Comment on above:   Performed By: #### D  
AU ####  
[address] [place], [place] [address] [phone]  
: Haresh Zimmer MD   
   
                      MCHC (RBC) [Mass/Vol] 33.6 g/dL  Normal     28.4-34.8  [facility]  
   
                                        Comment on above:   Performed By: #### D  
AU ####  
[facility]  
[address] [place] [address] [address] [place], [place] [address]283 [phone]  
: Haresh Zimmer MD   
   
                      MCV (RBC) [Entitic vol] 84.3 fL    Normal     82.6-102.9 M  
[facility]  
   
                                        Comment on above:   Performed By: #### D  
AU ####  
[address] [place] [address] [phone]  
: Haresh Zimmer MD   
   
                      Monocytes (Bld) [#/Vol] 0.89 10*3/uL Normal     0.10-1.20   
 [facility]  
   
                                        Comment on above:   Performed By: #### D  
AU ####  
[facility]  
[address] [place], [place] [address] [phone]  
: Haresh Zimmer MD   
   
                      Monocytes/100 WBC (Bld) 7 %        Normal     3-12       M  
[facility]  
   
                                        Comment on above:   Performed By: #### D  
AU ####  
[facility]  
[address] [place], [facility]83 [phone]  
: Haresh Zimmer MD   
   
                      Neutrophil (Seg) 60 %       Normal     36-65      [facility]  
   
                                        Comment on above:   Performed By: #### D  
AU ####  
[facility]  
[address] [place], [facility] [phone]  
: Haresh Zimmer MD   
   
                      NRBC Automated 0.0 per 100 WBC Normal     0.0        [facility]  
   
                                        Comment on above:   Performed By: #### D  
AU ####  
[facility]  
[address] [place], [facility]83 [phone]  
: Haresh Zimmer MD   
   
                                                    Platelet mean volume   
(Bld) [Entitic vol] 8.7 fL          Normal          8.1-13.5        [facility]  
   
                                        Comment on above:   Performed By: #### D  
AU ####  
[address] [place], [facility]83 [phone]  
: Haresh Zimmer MD   
   
                      Platelets (Bld) [#/Vol] 273 10*3/uL Normal     138-453      
[facility]  
   
                                        Comment on above:   Performed By: #### D  
AU ####  
[facility]  
[address] [place], [facility]83 [phone]  
: Haresh Zimmer MD   
   
                      RBC (Bld) [#/Vol] 5.15 10*6/uL High       3.95-5.11  [facility]  
   
                                        Comment on above:   Performed By: #### D  
AU ####  
[facility]  
[address] [place], [facility]83 [phone]  
: Haresh Zimmer MD   
   
                      WBC (Bld) [#/Vol] 12.5 10*3/uL High       3.5-11.3   [facility]  
   
                                        Comment on above:   Performed By: #### D  
AU ####  
[facility]  
[address] [place] [address]  
[phone]  
: Haresh Zimmer MD   
   
                      Auto Diff Performed NOT REPORTED Normal                [facility]  
   
                                        Comment on above:   Performed By: #### D  
AU ####  
[facility]  
[address] [place] [address]  
[phone]  
: Haresh Zimmer MD   
   
                      Platelet Estimate NOT REPORTED Normal                [facility]  
   
                                        Comment on above:   Performed By: #### D  
AU ####  
[facility]  
[address] [place] [address] [place] [address]  
[phone]  
: Haresh Zimmer MD   
   
                                                    RBC morphology finding   
Nom (Bld)       NOT REPORTED    Normal                          [facility]  
   
                                        Comment on above:   Performed By: #### D  
AU ####  
[facility]  
[address] [place] [address] [place] [address]  
[phone]  
: Haresh Zimmer MD   
   
                      WBC Morphology NOT REPORTED Normal                [facility]  
   
                                        Comment on above:   Performed By: #### D  
AU ####  
[facility]  
[address] [place] [address] [place] [address]  
[phone]  
: Haresh Zimmer MD   
   
                                                    COVID-19, RapidOrdered By: DAVID Rahman on 2021   
   
                                                    SARS-CoV-2 (COVID-19)   
RNA PAMELA+probe Ql (Unsp   
spec)               Not detected                            Not   
Detected                                [facility]  
Work   
Phone:   
[phone]  
   
                                        Comment on above:     
Rapid NAAT: The specimen is NEGATIVE for SARS-CoV-2, the novel   
coronavirus associated with  
COVID-19.  
  
The ID NOW COVID-19 assay is designed to detect the virus that   
causes COVID-19 in patients  
with signs and symptoms of infection who are suspected of   
COVID-19.  
An individual without symptoms of COVID-19 and who is not   
shedding SARS-CoV-2 virus would  
expect to have a negative (not detected) result in this assay.  
Negative results should be treated as presumptive and, if   
inconsistent with clinical signs  
and symptoms or necessary for patient management,  
should be tested with an alternative molecular assay. Negative   
results do not preclude  
SARS-CoV-2 infection and  
should not be used as the sole basis for patient management   
decisions.  
  
Fact sheet for Healthcare Providers:   
https://www.fda.gov/media/630784/download  
Fact sheet for Patients:   
https://www.fda.gov/media/427546/download  
  
Methodology: Isothermal Nucleic Acid Amplification  
  
   
   
                      Specimen Description .NASOPHARYNGEAL SWAB                   
      [facility]  
Work   
Phone:   
[phone]  
   
                                                                  [facility]  
[facility]   
Phone:   
[phone]  
   
                                                    Comp Metabolic Profon 2021   
   
                      (cont.)               Normal                [facility]  
   
                                        Comment on above:   Result Comment: Aver  
age GFR for 20-29 years old:  
116 mL/min/1.73sq m  
Chronic Kidney Disease:  
<60 mL/min/1.73sq m  
Kidney failure:  
<15 mL/min/1.73sq m  
eGFR calculated using average adult body mass. Additional eGFR   
calculator  
available at:  
http://www.[employer]/multiple_crcl_2012.htm   
   
                                                            Performed By: #### D  
AU ####  
[facility]  
[address] [place] [address] [place] [address] [phone]  
: Haresh Zimmer MD   
   
                      Albumin [Mass/Vol] 4.2 g/dL   Normal     3.5-5.2    [facility]  
   
                                        Comment on above:   Performed By: #### D  
AU ####  
[facility]  
[address] [place] [address] [phone]  
: Haresh Zimmer MD   
   
                      Albumin/Glob Ratio 1.4        Normal     1.0-2.5    [facility]  
   
                                        Comment on above:   Performed By: #### D  
AU ####  
[facility]  
[address] [place] [address] [phone]  
: Haresh Zimmer MD   
   
                      Alkaline Phos 74 U/L     Normal          [facility]  
   
                                        Comment on above:   Performed By: #### D  
AU ####  
[facility]  
[address] [place], [place] [address] [phone]  
: Haresh Zimmer MD   
   
                                                    ALT [Catalytic   
activity/Vol]   31 U/L          Normal          5-33            [facility]  
   
                                        Comment on above:   Performed By: #### D  
AU ####  
[facility]  
[address] [place], [place] [address] [phone]  
: Haresh Zimmer MD   
   
                      Anion gap [Moles/Vol] 13 mmol/L  Normal     9-17       [facility]  
   
                                        Comment on above:   Performed By: #### D  
AU ####  
[facility]  
[address] [place], [place] [address] [phone]  
: Haresh Zimmer MD   
   
                                                    AST [Catalytic   
activity/Vol]   19 U/L          Normal          <32             [facility]  
   
                                        Comment on above:   Performed By: #### D  
AU ####  
[facility]  
[address] [place], [place] [address]83 [phone]  
: Haresh Zimmer MD   
   
                      Bilirubin [Mass/Vol] 0.17 mg/dL Low        0.3-1.2    [facility]  
   
                                        Comment on above:   Performed By: #### D  
AU ####  
[facility]  
[address] [place], [place] [address] [phone]  
: Haresh Zimmer MD   
   
                      BUN/CRE Ratio 7          Low        9-20       [facility]  
   
                                        Comment on above:   Performed By: #### D  
AU ####  
[facility]  
[address] [place], [place] [address]83 [phone]  
: Haresh Zimmer MD   
   
                      Calcium [Mass/Vol] 9.2 mg/dL  Normal     8.6-10.4   [facility]  
   
                                        Comment on above:   Performed By: #### D  
AU ####  
[facility]  
[address] [place], [place] [address] [phone]  
: Haresh Zimmer MD   
   
                      Chloride [Moles/Vol] 104 mmol/L Normal          [facility]  
   
                                        Comment on above:   Performed By: #### D  
AU ####  
[facility]  
[address] [place], [place] [address]83 [phone]  
: Haresh Zimmer MD   
   
                      CO2 [Moles/Vol] 20 mmol/L  Normal     20-31      [facility]  
   
                                        Comment on above:   Performed By: #### D  
AU ####  
[facility]  
[address] [place] [address]  
[phone]  
: Haresh Zimmer MD   
   
                      Creatinine [Mass/Vol] 0.82 mg/dL Normal     0.50-0.90  [facility]  
   
                                        Comment on above:   Performed By: #### D  
AU ####  
[facility]  
[address] [place], [place] [address] [phone]  
: Haresh Zimmer MD   
   
                      GFR, Amer >60        Normal     >60        [facility]  
   
                                        Comment on above:   Performed By: #### D  
AU ####  
[facility]  
[address] [place], [place] [address] [phone]  
: Haresh Zimmer MD   
   
                      GFR,non  Amer >60        Normal     >60        [facility]  
   
                                        Comment on above:   Performed By: #### D  
AU ####  
[facility]  
[address] [place], [place] [address] [phone]  
: Haresh Zimmer MD   
   
                      Glucose [Mass/Vol] 100 mg/dL  High       70-99      [facility]  
   
                                        Comment on above:   Performed By: #### D  
AU ####  
[facility]  
[address] [place] [address]  
[phone]  
: Haresh Zimmer MD   
   
                      Potassium [Moles/Vol] 4.0 mmol/L Normal     3.7-5.3    [facility]  
   
                                        Comment on above:   Performed By: #### D  
AU ####  
[facility]  
[address] [place], [place] [address] [phone]  
: Haresh Zimmer MD   
   
                      Protein [Mass/Vol] 7.2 g/dL   Normal     6.4-8.3    [facility]  
   
                                        Comment on above:   Performed By: #### D  
AU ####  
[facility]  
[address] [place] [address] [phone]  
: Haresh Zimmer MD   
   
                      Sodium [Moles/Vol] 137 mmol/L Normal     135-144    [facility]  
   
                                        Comment on above:   Performed By: #### D  
AU ####  
[facility]  
[address] [place] [address] [phone]  
: Haresh Zimmer MD   
   
                      Staging:              Normal                [facility]  
   
                                        Comment on above:   Result Comment: Stag  
e 1: Some kidney damage normal GFR  
Stage 2: Mild kidney damage GFR 60-89  
Stage 3: Moderate kidney damage GFR 30-59  
Stage 4: Severe kidney damage GFR 15-29  
Stage 5: Severe kidney damage GFR <15  
ESRD - chronic treatment by dialysis or transplant   
   
                                                            Performed By: #### D  
AU ####  
[facility]  
[address] [place] [address] [phone]  
: Haresh Zimmer MD   
   
                                                    Urea nitrogen   
[Mass/Vol]      6 mg/dL         Normal          6-20            [facility]  
   
                                        Comment on above:   Performed By: #### D  
AU ####  
[facility]  
[address] [place] [address] [phone]  
: Haresh Zimmer MD   
   
                                                    Comprehensive Metabolic Pane  
lOrdered By: Seth Rahman on 2021   
   
                          Albumin [Mass/Vol] 4.2 g/dL                  3.5 - 5.2  
   
g/dL                                    [facility]   
Phone:   
[phone]  
   
                                                    Albumin/Globulin [Mass   
ratio]          1.4 {ratio}                                     [facility]   
[facility]  
[facility]   
Phone:   
[phone]  
   
                                                    ALP (Bld) [Catalytic   
activity/Vol]       74 U/L                                  35 - 104   
U/L                                     [facility]   
Phone:   
[phone]  
   
                                                    ALT [Catalytic   
activity/Vol]   31 U/L                          5 - 33 U/L      [facility]   
Phone:   
[phone]  
   
                          Anion gap [Moles/Vol] 13 mmol/L                 9 - 17  
   
mmol/L                                  [facility]   
Phone:   
[phone]  
   
                                                    AST [Catalytic   
activity/Vol]   19 U/L                          <32             [facility]   
Phone:   
[phone]  
   
                          Bilirubin [Mass/Vol] 0.17 mg/dL   Low          0.3 - 1  
.2   
mg/dL                                   [facility]   
Phone:   
[phone]  
   
                          Calcium [Mass/Vol] 9.2 mg/dL                 8.6 - 10.  
4   
mg/dL                                   [facility]   
Phone:   
[phone]  
   
                          Chloride [Moles/Vol] 104 mmol/L                98 - 10  
7   
mmol/L                                  [facility]   
Phone:   
[phone]  
   
                          CO2 [Moles/Vol] 20 mmol/L                 20 - 31   
mmol/L                                  [facility]   
Phone:   
[phone]  
   
                          Creatinine [Mass/Vol] 0.82 mg/dL                0.50 -  
 0.90   
mg/dL                                   [facility]   
Phone:   
[phone]  
   
                                                    Free PSA/Total PSA   
[Mass fraction]     7.2 g/dL                                6.4 - 8.3   
g/dL                                    [facility]   
Phone:   
[phone]  
   
                      GFR  >60                   >60 mL/min [facility]  
y   
Health  
Work   
Phone:   
[phone]  
   
                                                    GFR Non-   
American        >60                             >60 mL/min      [facility]   
Phone:   
[phone]  
   
                          Glucose [Mass/Vol] 100 mg/dL    High         70 - 99   
mg/dL                                   [facility]   
Phone:   
[phone]  
   
                          Potassium [Moles/Vol] 4.0 mmol/L                3.7 -   
5.3   
mmol/L                                  [facility]   
Phone:   
[phone]  
   
                          Sodium [Moles/Vol] 137 mmol/L                135 - 144  
   
mmol/L                                  [facility]   
Phone:   
[phone]  
   
                                                    Urea nitrogen (BldV)   
[Mass/Vol]          6 mg/dL                                 6 - 20   
mg/dL                                   [facility]   
Phone:   
[phone]  
   
                                                    Urea   
nitrogen/Creatinine   
(Bld) [Mass ratio] 7               Low                             [facility]   
Phone:   
[phone]  
   
                                                    Drug Scr, Abuse, Uron 2021   
   
                      Amphetamine(s),Ur Negative   Normal     NEG        [facility]  
   
                                        Comment on above:   Performed By: #### D  
AU ####  
[facility]  
[address] [place], [place] [address] [phone]  
: Haresh Zimmer MD   
   
                      Barbiturate(s),Ur Negative   Normal     [facility]  
   
                                        Comment on above:   Performed By: #### D  
AU ####  
[facility]  
[address] [place], [facility]83 [phone]  
: Haresh Zimmer MD   
   
                      Benzodiazepine(s) Negative   Normal     [facility]  
   
                                        Comment on above:   Performed By: #### D  
AU ####  
[facility]  
[address] [place] [address] [phone]  
: Haresh Zimmer MD   
   
                      Buprenorphrine, Ur Negative   Normal     [facility]  
   
                                        Comment on above:   Performed By: #### D  
AU ####  
[facility]  
[address] [place], [facility]83 [phone]  
: Haresh Zimmer MD   
   
                      Cannabinoid(s),Ur Positive   Abnormal   [facility]  
   
                                        Comment on above:   Performed By: #### D  
AU ####  
[facility]  
[address] [place] [address] [place] [address] [phone]  
: Haresh Zimmer MD   
   
                      Cocaine Metabolite Negative   Normal     [facility]  
   
                                        Comment on above:   Performed By: #### D  
AU ####  
[facility]  
[address] [place], [facility]83 [phone]  
: Haresh Zimmer MD   
   
                      Methadone Ql (U) Negative   Normal     [facility]  
   
                                        Comment on above:   Performed By: #### D  
AU ####  
[facility]  
[address] [place] [address] [phone]  
: Haresh Zimmer MD   
   
                      Methamphetamine, Ur Negative   Normal     [facility]  
   
                                        Comment on above:   Performed By: #### D  
AU ####  
[facility]  
[address] [place] [address] [phone]  
: Haresh Zimmer MD   
   
                      Opiate(s), Ur Negative   Normal     NEG        [facility]  
   
                                        Comment on above:   Performed By: #### D  
AU ####  
[facility]  
[address] [place] [address] [address] [place], [place] [address] [phone]  
: Haresh Zimmer MD   
   
                      Oxycodone, Urine Negative   Normal     NEG        [facility]  
   
                                        Comment on above:   Performed By: #### D  
AU ####  
[facility]  
[address] [place] [address] [address] [place], [place] [address] [phone]  
: Haresh Zimmer MD   
   
                      Phencyclidine, Ur Negative   Normal     NEG        [facility]  
   
                                        Comment on above:   Performed By: #### D  
AU ####  
[facility]  
[address] [place] [address] [place] [address] [phone]  
: Haresh Zimmer MD   
   
                      Propoxyphene,Urine Negative   Normal     NEG        [facility]  
   
                                        Comment on above:   Performed By: #### D  
AU ####  
[facility]  
[address] [place] [address] [place] [address] [phone]  
: Haresh Zimmer MD   
   
                                                    Tricyclic   
antidepressants Screen   
Ql (U)          Negative        Normal          NEG             [facility]  
   
                                        Comment on above:   Result Comment: Drug  
 screen results are to be used for medical  
  
purposes only. All positive  
results are unconfirmed. Testing for employment or legal uses   
should be sent  
to a reference laboratory for confirmation.   
   
                                                            Performed By: #### D  
AU ####  
[facility]  
[address] [place] [address] [place] [address] [phone]  
: Haresh Zimmer MD   
   
                      Interpretive Info NOT REPORTED Normal                [facility]  
   
                                        Comment on above:   Performed By: #### D  
AU ####  
[facility]  
[address] [place] [address] [place] [address] [phone]  
: Haresh Zimmer MD   
   
                      MDMA, Urine NOT REPORTED Normal     NEG        [facility]  
   
                                        Comment on above:   Performed By: #### D  
AU ####  
[facility]  
[address] [place] [address] [place] [address] [phone]  
: Haresh Zimmer MD   
   
                                                    EthanolOrdered By: Seth purvis on 2021   
   
                      Ethanol [Mass/Vol] mg/dL                 <10 mg/dL  [facility]   
Phone:   
[phone]  
   
                      Ethanol percent <0.010                <0.010 %   [facility]   
Phone:   
[phone]  
   
                                                                  [facility]   
Phone:   
[phone]  
   
                                                    Ethanol Alcoholon 2021  
   
   
                      Ethanol [Mass/Vol] mg/dL      Normal     <10        [facility]  
   
                                        Comment on above:   Performed By: #### C  
OVRB ####  
[facility]  
[address] [place] [address] [phone]  
: Haresh Zimmer MD   
   
                      Ethanol percent <0.010     Normal     <0.010     [facility]  
   
                                        Comment on above:   Performed By: #### C  
OVRB ####  
[facility]  
[address] [place] [address] [phone]  
: Haresh Zimmer MD   
   
                                                    HCG Qualitative, SerumOrdere  
d By: Seth Rahman on 2021   
   
                      hCG Qual   Negative              NEGATIVE   [facility]   
Phone:   
[phone]  
   
                                        Comment on above:   Specimens with hCG l  
evels near the threshold of the test (25   
mIU/mL) may give a negative or  
indeterminate result. In such cases, another test should be   
performed with a new specimen  
in 48-72 hours. If early pregnancy is suspected clinically in   
this setting, correlation  
with quantitative serum b-hCG level is suggested.  
  
[employer] has confirmed the use of plasma for this   
test. This has not been cleared  
or approved by the U.S. Food and Drug Administration. The FDA   
has determined that such  
clearance is not necessary.  
  
   
   
                                                                  [facility]   
Phone:   
[phone]  
   
                                                    HCG Screen, Bloodon 20  
21   
   
                      HCG Screen, Blood Negative   Normal     NEG        [facility]  
   
                                        Comment on above:   Result Comment: Spec  
imens with hCG levels near the threshold   
of the test (25 mIU/mL) may give a  
negative or indeterminate result. In such cases, another test   
should be  
performed with a new specimen in 48-72 hours. If early   
pregnancy is suspected  
clinically in this setting, correlation with quantitative   
serum b-hCG level is  
suggested.  
[facility] the use of plasma for this   
test. This has not  
been cleared or approved by the U.S. Food and Drug   
Administration. The FDA has  
determined that such clearance is not necessary.   
   
                                                            Performed By: #### D  
AU ####  
[facility]  
[address] [place] [address]  
[phone]  
: Haresh Zimmer MD   
   
                                                    Laboratory - Chemistry and C  
hemistry - challengeOrdered By: Seth Rahman on   
2021   
   
                                                    GFR/1.73 sq M.predicted   
MDRD (S/P/Bld) [Vol   
rate/Area]                                                      [facility]   
Phone:   
[phone]  
   
                                        Comment on above:   Average GFR for 20-2  
9 years old:  
116 mL/min/1.73sq m  
Chronic Kidney Disease:  
<60 mL/min/1.73sq m  
Kidney failure:  
<15 mL/min/1.73sq m  
  
  
eGFR calculated using average adult body mass. Additional eGFR   
calculator available at:  
  
http://www.[employer]/multiple_crcl_2012.htm  
  
  
   
   
                                                            Stage 1: Some kidney  
 damage normal GFR  
Stage 2: Mild kidney damage GFR 60-89  
Stage 3: Moderate kidney damage GFR 30-59  
Stage 4: Severe kidney damage GFR 15-29  
Stage 5: Severe kidney damage GFR <15  
ESRD - chronic treatment by dialysis or transplant  
  
  
   
   
                                                    No Panel InformationOrdered   
By: Seth Rahman on 2021   
   
                                                    Interpretation and   
review of laboratory   
results         Abnormal                                        [facility]   
Phone:   
[phone]  
   
                                                                  [facility]   
Phone:   
[phone]  
   
                                                    SARS-CoV-2on 2021   
   
                                                    SARS-CoV-2 (COVID-19)   
RNA PAMELA+probe Ql (Unsp   
spec)           Not detected    Normal          NOTDET          [facility]  
   
                                        Comment on above:   Result Comment:  
Rapid NAAT: The specimen is NEGATIVE for SARS-CoV-2, the novel   
coronavirus  
associated with COVID-19.  
The ID NOW COVID-19 assay is designed to detect the virus that   
causes COVID-19  
in patients with signs and symptoms of infection who are   
suspected of COVID-19.  
An individual without symptoms of COVID-19 and who is not   
shedding SARS-CoV-2  
virus would expect to have a negative (not detected) result in   
this assay.  
Negative results should be treated as presumptive and, if   
inconsistent with  
clinical signs and symptoms or necessary for patient   
management,  
should be tested with an alternative molecular assay. Negative   
results do not  
preclude SARS-CoV-2 infection and  
should not be used as the sole basis for patient management   
decisions.  
Fact sheet for Healthcare Providers:   
https://www.fda.gov/media/380764/download  
Fact sheet for Patients:   
https://www.fda.gov/media/896574/download  
Methodology: Isothermal Nucleic Acid Amplification   
   
                                                            Performed By: #### C  
OVRB ####  
[address] [place] [address] [phone]  
: Haresh Zimmer MD   
   
                                                    Salicylateon 2021   
   
                      Salicylate <1         Low        3-10       [facility]  
   
                                        Comment on above:   Performed By: #### D  
AU ####  
[address] [place] [address] [phone]  
: Haresh Zimmer MD   
   
                                                    SalicylateOrdered By: Meghna Rahman on 2021   
   
                          Salicylate Lvl <1           Low          3 - 10   
mg/dL                                   [facility]  
Work   
Phone:   
[phone]  
   
                                                    Urinalysis w/ Microon 2021   
   
                      -----                 Normal                [facility]  
   
                                        Comment on above:   Performed By: #### C  
OVRB ####  
[address] [place] [address] [phone]  
: Haresh Zimmer MD   
   
                      Bacteria   1+         Abnormal   NONE       [facility]  
   
                                        Comment on above:   Performed By: #### C  
OVRB ####  
[address] [place] [address] [phone]  
: Haresh Zimmer MD   
   
                      Bilirubin, SemiQt,Ur Negative   Normal     NEG        [facility]  
   
                                        Comment on above:   Performed By: #### C  
OVRB ####  
[address] [place] [address] [phone]  
: Haresh Zimmer MD   
   
                      Blood, Urine Negative   Normal     NEG        [facility]  
   
                                        Comment on above:   Performed By: #### C  
OVRB ####  
[facility]  
[address] [place], [place] [address] [phone]  
: Haresh Zimmer MD   
   
                      Clarity (U) CLEAR      Normal     CLEAR      [facility]  
   
                                        Comment on above:   Performed By: #### C  
OVRB ####  
[facility]  
[address] [place], [place] [address] [phone]  
: Haresh Zimmer MD   
   
                      Color (U)  YELLOW     Normal     YEL        [facility]  
   
                                        Comment on above:   Performed By: #### C  
OVRB ####  
[facility]  
[address] [place], [place] [address] [phone]  
: Haresh Zimmer MD   
   
                                                    Epithelial cells LM Ql   
(Urine sed)     5 TO 10         Normal          0-25            [facility]  
   
                                        Comment on above:   Performed By: #### C  
OVRB ####  
[facility]  
[address] [place], [place] [address] [phone]  
: Haresh Zimmer MD   
   
                      Glucose Ql (U) Negative   Normal     NEG        [facility]  
   
                                        Comment on above:   Performed By: #### C  
OVRB ####  
[facility]  
[address] [place], [place] [address] [phone]  
: Haresh Zimmer MD   
   
                      Ketones Ql (U) Negative   Normal     NEG        [facility]  
   
                                        Comment on above:   Performed By: #### C  
OVRB ####  
[facility]  
[address] [place], [place] [address] [phone]  
: Haresh Zimmer MD   
   
                                                    Leukocyte esterase Test   
strip Ql (U)    Negative        Normal          NEG             [facility]  
   
                                        Comment on above:   Performed By: #### C  
OVRB ####  
[facility]  
[address] [place], [place] [address] [phone]  
: Haresh Zimmer MD   
   
                      Mucus Strands 1+         Abnormal   NONE       [facility]  
   
                                        Comment on above:   Performed By: #### C  
OVRB ####  
[facility]  
[address] [place], [place] [address] [phone]  
: Haresh Zimmer MD   
   
                      Nitrite,Ur Negative   Normal     NEG        [facility]  
   
                                        Comment on above:   Performed By: #### C  
OVRB ####  
[facility]  
[address] [place], [place] [address] [phone]  
: Haresh Zimmer MD   
   
                      PH,Ur      6.5        Normal     5.0-9.0    [facility]  
   
                                        Comment on above:   Performed By: #### C  
OVRB ####  
[facility]  
[address] [place], [place] [address] [phone]  
: Haresh Zimmer MD   
   
                      Protein Ql (U) Negative   Normal     NEG        [facility]  
   
                                        Comment on above:   Performed By: #### C  
OVRB ####  
[facility]  
[address] [place], [facility] [phone]  
: Haresh Zimmer MD   
   
                      Spec. Gravity,Ur 1.020      Normal     1.010-1.020 [facility]  
   
                                        Comment on above:   Performed By: #### C  
OVRB ####  
[facility]  
[address] [place], [place] [address] [phone]  
: Haresh Zimmer MD   
   
                      Urine RBC's 0 TO 2     Normal     0-2        [facility]  
   
                                        Comment on above:   Performed By: #### C  
OVRB ####  
[facility]  
[address] [place], [place] [address] [phone]  
: Haresh Zimmer MD   
   
                      Urine WBC's 0 TO 2     Normal     0-5        [facility]  
   
                                        Comment on above:   Performed By: #### C  
OVRB ####  
[facility]  
[address] [place], [place] [address] [phone]  
: Haresh Zimmer MD   
   
                      Urobilinogen,Ur Normal     Normal     NORM       [facility]  
   
                                        Comment on above:   Performed By: #### C  
OVRB ####  
[facility]  
[address] [place] [address]  
[phone]  
: Haresh Zimmer MD   
   
                                                    Amorphous sediment LM   
Ql (Urine sed)  NOT REPORTED    Normal          NONE            [facility]  
   
                                        Comment on above:   Performed By: #### C  
OVRB ####  
[facility]  
[address] [place], [place] [address] [phone]  
: Haresh Zimmer MD   
   
                      Casts      NOT REPORTED Normal                [facility]  
   
                                        Comment on above:   Performed By: #### C  
OVRB ####  
[facility]  
[address] [place], [place] [address] [phone]  
: Haresh Zimmer MD   
   
                      Comment    NOT REPORTED Normal                [facility]  
   
                                        Comment on above:   Performed By: #### C  
OVRB ####  
[facility]  
[address] [place], [place] [address] [phone]  
: Haresh Zimmer MD   
   
                                                    Crystals LM Nom (Urine   
sed)            NOT REPORTED    Normal          [facility]  
   
                                        Comment on above:   Performed By: #### C  
OVRB ####  
[facility]  
[address] [place], [place] [address] [phone]  
: Haresh Zimmer MD   
   
                      Epithelial, Renal NOT REPORTED Normal     0          [facility]  
   
                                        Comment on above:   Performed By: #### C  
OVRB ####  
[facility]  
[address] [place], [place] [address] [phone]  
: Haresh Zimmer MD   
   
                      Other Observations NOT REPORTED Normal     NREQ       [facility]  
   
                                        Comment on above:   Performed By: #### C  
OVRB ####  
[facility]  
[address] [place], [place] [address] [phone]  
: Haresh Zimmer MD   
   
                      Trichomonas NOT REPORTED Normal     [facility]  
   
                                        Comment on above:   Performed By: #### C  
OVRB ####  
[facility]  
[address] [place], [place] [address] [phone]  
: Haresh Zimmer MD   
   
                      Yeast      NOT REPORTED Normal     [facility]  
   
                                        Comment on above:   Performed By: #### C  
OVRB ####  
[facility]  
[address] [place], [place] [address] [phone]  
: Haresh Zimmer MD   
   
                                                    Urinalysis with microscopicO  
rdered By: Seth Rahman on 2021   
   
                      -                                           "[employer]  
Work   
Phone:   
[phone]  
   
                      Amorphous, UA NOT REPORTED            None       "[employer]  
Work   
Phone:   
[phone]  
   
                      Bacteria, UA 1+         Abnormal   None       "[employer]  
Work   
Phone:   
[phone]  
   
                      Bilirubin Urine Negative              NEGATIVE   [facility]   
Phone:   
[phone]  
   
                      Casts UA   NOT REPORTED            /LPF       "[employer]  
Work   
Phone:   
[phone]  
   
                      Color, UA  YELLOW                YELLOW     "[employer]  
Work   
Phone:   
[phone]  
   
                      Crystals, UA NOT REPORTED            None /HPF  "[employer]  
Work   
Phone:   
[phone]  
   
                      Epithelial Cells UA 5 TO 10                          "[employer]  
Work   
Phone:   
[phone]  
   
                      Glucose, Ur Negative              NEGATIVE   [facility]   
Phone:   
[phone]  
   
                                                    Interpretation and   
review of laboratory   
results         Abnormal                                        "[employer]  
Work   
Phone:   
[phone]  
   
                      Ketones Ql (U) Negative              NEGATIVE   [facility]   
Phone:   
[phone]  
   
                                                    Leukocyte esterase Test   
strip Ql (U)    Negative                        NEGATIVE        [facility]   
Phone:   
[phone]  
   
                      Mucus, UA  1+         Abnormal   None       [facility]   
Phone:   
[phone]  
   
                      Nitrite, Urine Negative              NEGATIVE   [facility]   
Phone:   
[phone]  
   
                      Other Observations UA NOT REPORTED            NOT REQ.   [facility]  
[facility]  
Work   
Phone:   
[phone]  
   
                      pH, UA     6.5                              "[employer]  
Work   
Phone:   
[phone]  
   
                      Protein, UA Negative              NEGATIVE   [facility]   
Phone:   
[phone]  
   
                      RBC, UA    0 TO 2                           "[employer]  
Work   
Phone:   
[phone]  
   
                      Renal Epithelial, UA NOT REPORTED            0 /HPF     [facility]  
[facility]  
Work   
Phone:   
[phone]  
   
                      Specific Gravity, UA 1.020                            [facility]  
[facility]   
Health  
Work   
Phone:   
[phone]  
   
                      Trichomonas, UA NOT REPORTED            None       [facility]  
Work   
Phone:   
[phone]  
   
                      Turbidity UA CLEAR                 CLEAR      [facility]  
Work   
Phone:   
[phone]  
   
                      Urinalysis Comments NOT REPORTED                       [facility]   
Health  
Work   
Phone:   
[phone]  
   
                      Urine Hgb  Negative              NEGATIVE   [facility]   
[facility]  
Work   
Phone:   
[phone]  
   
                      Urobilinogen, Urine Normal                Normal     [facility]  
   
Health  
Work   
Phone:   
[phone]  
   
                      WBC, UA    0 TO 2                           [facility]   
[facility]  
Work   
Phone:   
[phone]  
   
                      Yeast, UA  NOT REPORTED            None       [facility]  
Work   
Phone:   
[phone]  
   
                                                                  [facility]   
[facility]  
Work   
Phone:   
[phone]  
   
                                                    Urine Drug ScreenOrdered By:  
 Seth Rahman on 2021   
   
                      Amphetamine Screen, Ur Negative              NEGATIVE   [facility]  
[facility]   
Health  
Work   
Phone:   
[phone]  
   
                      Barbiturate Screen, Ur Negative              NEGATIVE   [facility]  
[facility]   
Health  
Work   
Phone:   
[phone]  
   
                                                    Benzodiazepine Screen,   
Urine           Negative                        NEGATIVE        [facility]  
Work   
Phone:   
[phone]  
   
                      Buprenorphine Urine Negative              NEGATIVE   [facility]  
   
Health  
Work   
Phone:   
[phone]  
   
                      Cannabinoid Scrn, Ur Positive   Abnormal   NEGATIVE   [facility]  
[facility]   
Health  
Work   
Phone:   
[phone]  
   
                                                    Cocaine Metabolite,   
Urine           Negative                        NEGATIVE        [facility]  
Work   
Phone:   
[phone]  
   
                                                    Interpretation and   
review of laboratory   
results         Abnormal                                        [facility]   
[facility]  
Work   
Phone:   
[phone]  
   
                      MDMA, Urine NOT REPORTED            NEGATIVE   [facility]  
Work   
Phone:   
[phone]  
   
                      Methadone Screen, Urine Negative              NEGATIVE   [facility]  
[facility]   
[facility]  
Work   
Phone:   
[phone]  
   
                      Methamphetamine, Urine Negative              NEGATIVE   [facility]  
[facility]   
Health  
Work   
Phone:   
[phone]  
   
                      Opiates, Urine Negative              NEGATIVE   [facility]   
[facility]  
Work   
Phone:   
[phone]  
   
                      Oxycodone Screen, Ur Negative              NEGATIVE   [facility]  
[facility]   
Health  
Work   
Phone:   
[phone]  
   
                      Phencyclidine, Urine Negative              NEGATIVE   [facility]  
y   
Health  
Work   
Phone:   
[phone]  
   
                      Propoxyphene, Urine Negative              NEGATIVE   [facility]   
Phone:   
[phone]  
   
                      Test Information NOT REPORTED                       [facility]   
Phone:   
[phone]  
   
                                                    Tricyclic   
Antidepressants, Urine Negative                        NEGATIVE        [facility]   
Phone:   
[phone]  
   
                                        Comment on above:   Drug screen results   
are to be used for medical purposes only.   
All positive results are  
unconfirmed. Testing for employment or legal uses should be   
sent to a reference laboratory  
for confirmation.  
  
   
   
                                                                  [facility]   
Phone:   
[phone]  
   
                                                    Acetaminophenon 2021   
   
                                                    Acetaminophen   
[Mass/Vol]      ug/mL           Low             10-30           [facility]  
   
                                        Comment on above:   Performed By: #### D  
AU ####  
[facility]  
[address] [place] [address] [phone]  
: Haresh Zimmer MD   
   
                                                    Acetaminophen levelOrdered B  
y: Malika Chengsain on 2021   
   
                          Acetaminophen Level <5           Low          10 - 30   
ug/mL                                   [facility]   
Phone:   
[phone]  
   
                                                    Interpretation and   
review of laboratory   
results         Abnormal                                        [facility]   
Phone:   
[phone]  
   
                                                                  [facility]   
Phone:   
[phone]  
   
                                                    CBC auto differentialOrdered  
 By: Malika Chengsain on 2021   
   
                      Absolute Eos # 0.18                             [facility]   
Phone:   
[phone]  
   
                                                    Absolute Immature   
Granulocyte     0.06                                            [facility]   
Phone:   
[phone]  
   
                      Absolute Lymph # 2.41                             [facility]   
Phone:   
[phone]  
   
                      Absolute Mono # 0.83                             [facility]   
Phone:   
[phone]  
   
                      Basophils (Bld) [#/Vol] 0.07 10*3/uL                        
 [facility]   
Phone:   
[phone]  
   
                      Basophils/100 WBC (Bld) 1 %                   0 - 2 %    M  
[facility]   
Phone:   
[phone]  
   
                      Differential Type NOT REPORTED                       [facility]   
Phone:   
[phone]  
   
                                                    Eosinophils/100 WBC   
(Bld)           1 %                             1 - 4 %         [facility]   
Phone:   
[phone]  
   
                                                    Hematocrit (Bld)   
[Volume fraction]   45.1 %                                  36.3 - 47.1   
%                                       [facility]   
Phone:   
[phone]  
   
                                                    Hemoglobin.gastrointest  
inal spec 1 Ql (Stl) 15.0 g/dL                               11.9 - 15.1   
g/dL                                    [facility]   
Phone:   
[phone]  
   
                                                    Immature   
granulocytes/100 WBC   
(Bld)           1 %             High            0               [facility]   
Phone:   
[phone]  
   
                                                    Interpretation and   
review of laboratory   
results         Abnormal                                        [facility]   
Phone:   
[phone]  
   
                                                    Lymphocytes/100 WBC   
(Bld)           18 %            Low             24 - 43 %       [facility]   
Phone:   
[phone]  
   
                                                    MCH (RBC) [Entitic   
mass]               28.4 pg                                 25.2 - 33.5   
pg                                      [facility]   
Phone:   
[phone]  
   
                          MCHC (RBC) [Mass/Vol] 33.3 g/dL                 28.4 -  
 34.8   
g/dL                                    [facility]   
Phone:   
[phone]  
   
                          MCV (RBC) [Entitic vol] 85.4 fL                   82.6  
 -   
102.9 fL                                [facility]   
Phone:   
[phone]  
   
                      Monocytes/100 WBC (Bld) 6 %                   3 - 12 %   M  
[facility]   
Phone:   
[phone]  
   
                          NRBC Automated 0.0                       0.0 per 100   
WBC                                     [facility]   
Phone:   
[phone]  
   
                                                    Platelet distribution   
width (Bld) [Ratio] 13.0 %                                  11.8 - 14.4   
%                                       [facility]   
Phone:   
[phone]  
   
                      Platelet Estimate NOT REPORTED                       [facility]   
Phone:   
[phone]  
   
                                                    Platelet mean volume   
(Bld) [Entitic vol] 8.3 fL                                  8.1 - 13.5   
fL                                      [facility]   
Phone:   
[phone]  
   
                      Platelets (Bld) [#/Vol] 301 10*3/uL                         
[facility]   
Phone:   
[phone]  
   
                          RBC (Bld) [#/Vol] 5.28 10*6/uL High         3.95 - 5.1  
1   
m/uL                                    [facility]   
Phone:   
[phone]  
   
                      RBC (Bld) [#/Vol] NOT REPORTED                       [facility]   
Phone:   
[phone]  
   
                                                    Segmented   
neutrophils/100 WBC   
(Bld)           73 %            High            36 - 65 %       [facility]   
Phone:   
[phone]  
   
                      Segs Absolute 9.71       High                  [facility]   
Phone:   
[phone]  
   
                      WBC (Bld) [#/Vol] 13.3 10*3/uL High                  [facility]   
Phone:   
[phone]  
   
                      WBC (Bld) [#/Vol] NOT REPORTED                       [facility]   
Phone:   
[phone]  
   
                                                                  [facility]   
Phone:   
[phone]  
   
                                                    CBC with Diffon 2021   
   
                      Abs. Basophil 0.07 k/uL  Normal     0.00-0.20  [facility]  
   
                                        Comment on above:   Performed By: #### C  
VERONICA, WEN, CP, HCG ####  
[facility]  
[address] [place] [address] [address] [place], [place] [address]  
[phone]  
: Haresh Zimmer MD   
   
                      Abs.Imm.Granulocyte 0.06 k/uL  Normal     0.00-0.30  [facility]  
   
                                        Comment on above:   Performed By: #### C  
WEN MARTIN, CP, HCG ####  
[facility]  
[address] [place], [facility]83 [phone]  
: Haresh Zimmer MD   
   
                      Abs.Neutrophil (Seg) 9.71 k/uL  High       1.50-8.10  [facility]  
   
                                        Comment on above:   Performed By: #### C  
WEN MARTIN, CP, HCG ####  
[facility]  
[address] [place] [address] [phone]  
: Haresh Zimmer MD   
   
                      Basophils/100 WBC (Bld) 1 %        Normal     0-2        M  
[facility]  
   
                                        Comment on above:   Performed By: #### C  
DP, WEN, CP, HCG ####  
[facility]  
[address] [place], [place] [address]  
[phone]  
: Haresh Zimmer MD   
   
                                                    Eosinophils (Bld)   
[#/Vol]         0.18 10*3/uL    Normal          0.00-0.44       [facility]  
   
                                        Comment on above:   Performed By: #### C  
DP, SALI, CP, HCG ####  
[address] [place], [place] [address]  
[phone]  
: Haresh Zimmer MD   
   
                                                    Eosinophils/100 WBC   
(Bld)           1 %             Normal          1-4             [facility]  
   
                                        Comment on above:   Performed By: #### C  
DP, SALI, CP, HCG ####  
[address] [place] [address]  
[phone]  
: Haresh Zimmer MD   
   
                                                    Erythrocyte   
distribution width   
(RBC) [Ratio]   13.0 %          Normal          11.8-14.4       [facility]  
   
                                        Comment on above:   Performed By: #### C  
DP, SALI, CP, HCG ####  
[address] [place], [place] [address]  
[phone]  
: Haresh Zimmer MD   
   
                                                    Hematocrit (Bld)   
[Volume fraction] 45.1 %          Normal          36.3-47.1       [facility]  
   
                                        Comment on above:   Performed By: #### C  
DP, SALI, CP, HCG ####  
[address] [place], [place] [address]  
[phone]  
: Haresh Zimmer MD   
   
                                                    Hemoglobin (Bld)   
[Mass/Vol]      15.0 g/dL       Normal          11.9-15.1       [facility]  
   
                                        Comment on above:   Performed By: #### C  
DP, SALI, CP, HCG ####  
[address] [place], [facility]83 [phone]  
: Haresh Zimmer MD   
   
                                                    Immature   
granulocytes/100 WBC   
(Bld)           1 %             High            0               [facility]  
   
                                        Comment on above:   Performed By: #### C  
DP, SALI, CP, HCG ####  
[facility]  
[address] [place], [place] [address] [phone]  
: Haresh Zimmer MD   
   
                                                    Lymphocytes (Bld)   
[#/Vol]         2.41 10*3/uL    Normal          1.10-3.70       [facility]  
   
                                        Comment on above:   Performed By: #### C  
DP, SALI, CP, HCG ####  
[facility]  
[address] [place], [place] [address]  
[phone]  
: Haresh Zimmer MD   
   
                                                    Lymphocytes/100 WBC   
(Bld)           18 %            Low             24-43           [facility]  
   
                                        Comment on above:   Performed By: #### C  
DP, SALI, CP, HCG ####  
[address] [place], [facility]97 [phone]  
: Haresh Zimmer MD   
   
                                                    MCH (RBC) [Entitic   
mass]           28.4 pg         Normal          25.2-33.5       [facility]  
   
                                        Comment on above:   Performed By: #### C  
DP, SALI, CP, HCG ####  
[address] [place], [place] [address]  
[phone]  
: Haresh Zimmer MD   
   
                      MCHC (RBC) [Mass/Vol] 33.3 g/dL  Normal     28.4-34.8  [facility]  
   
                                        Comment on above:   Performed By: #### C  
DP, SALI, CP, HCG ####  
[address] [place], [place] [address]  
[phone]  
: Haresh Zimmer MD   
   
                      MCV (RBC) [Entitic vol] 85.4 fL    Normal     82.6-102.9 M  
[facility]  
   
                                        Comment on above:   Performed By: #### C  
DP, SALI, CP, HCG ####  
[address] [place] [address] [phone]  
: Haresh Zimmer MD   
   
                      Monocytes (Bld) [#/Vol] 0.83 10*3/uL Normal     0.10-1.20   
 [facility]  
   
                                        Comment on above:   Performed By: #### C  
DP, SALI, CP, HCG ####  
[facility]  
[address] [place] [address]  
[phone]  
: Haresh Zimmer MD   
   
                      Monocytes/100 WBC (Bld) 6 %        Normal     3-12       M  
[facility]  
   
                                        Comment on above:   Performed By: #### C  
DP, SALI, CP, HCG ####  
[facility]  
[address] [place] [address]  
[phone]  
: Haresh Zimmer MD   
   
                      Neutrophil (Seg) 73 %       High       36-65      [facility]  
   
                                        Comment on above:   Performed By: #### C  
DP, SALI, CP, HCG ####  
[address] [place], [place] [address]  
[phone]  
: Haresh Zimmer MD   
   
                      NRBC Automated 0.0 per 100 WBC Normal     0.0        [facility]  
   
                                        Comment on above:   Performed By: #### C  
DP, SALI, CP, HCG ####  
[address] [place], [facility]83 [phone]  
: Haresh Zimmer MD   
   
                                                    Platelet mean volume   
(Bld) [Entitic vol] 8.3 fL          Normal          8.1-13.5        [facility]  
   
                                        Comment on above:   Performed By: #### C  
DP, SALI, CP, HCG ####  
[address] [place] [address]  
[phone]  
: Haresh Zimmer MD   
   
                      Platelets (Bld) [#/Vol] 301 10*3/uL Normal     138-453      
[facility]  
   
                                        Comment on above:   Performed By: #### C  
DP, SALI, CP, HCG ####  
[address] [place] [address]  
[phone]  
: Haresh Zimmer MD   
   
                      RBC (Bld) [#/Vol] 5.28 10*6/uL High       3.95-5.11  [facility]  
   
                                        Comment on above:   Performed By: #### C  
DP, SALI, CP, HCG ####  
[address] [place] [address]  
[phone]  
: Haresh Zimmer MD   
   
                      WBC (Bld) [#/Vol] 13.3 10*3/uL High       3.5-11.3   [facility]  
   
                                        Comment on above:   Performed By: #### C  
DP, SALI, CP, HCG ####  
[address] [place] [address]  
[phone]  
: Haresh Zimmer MD   
   
                      Auto Diff Performed NOT REPORTED Normal                [facility]  
   
                                        Comment on above:   Performed By: #### C  
DP, SALI, CP, HCG ####  
[address] [place] [address]  
[phone]  
: Haresh Zimmer MD   
   
                      Platelet Estimate NOT REPORTED Normal                [facility]  
   
                                        Comment on above:   Performed By: #### C  
DP, SALI, CP, HCG ####  
[address] [place], [facility]83  
[phone]  
: Haersh Zimmer MD   
   
                                                    RBC morphology finding   
Nom (Bld)       NOT REPORTED    Normal                          [facility]  
   
                                        Comment on above:   Performed By: #### C  
DP, SALI, CP, HCG ####  
[address] [place] [address]  
[phone]  
: Haresh Zimmer MD   
   
                      WBC Morphology NOT REPORTED Normal                [facility]  
   
                                        Comment on above:   Performed By: #### C  
DP, SALI, CP, HCG ####  
[address] [place], [facility]83  
[phone]  
: Haresh Zimmer MD   
   
                                                    COVID-19, RapidOrdered By: SIMONE Shepherd on 2021   
   
                                                    SARS-CoV-2 (COVID-19)   
RNA PAMELA+probe Ql (Unsp   
spec)               Not detected                            Not   
Detected                                [facility]  
Work   
Phone:   
[phone]  
   
                                        Comment on above:     
Rapid NAAT: The specimen is NEGATIVE for SARS-CoV-2, the novel   
coronavirus associated with  
COVID-19.  
  
The ID NOW COVID-19 assay is designed to detect the virus that   
causes COVID-19 in patients  
with signs and symptoms of infection who are suspected of   
COVID-19.  
An individual without symptoms of COVID-19 and who is not   
shedding SARS-CoV-2 virus would  
expect to have a negative (not detected) result in this assay.  
Negative results should be treated as presumptive and, if   
inconsistent with clinical signs  
and symptoms or necessary for patient management,  
should be tested with an alternative molecular assay. Negative   
results do not preclude  
SARS-CoV-2 infection and  
should not be used as the sole basis for patient management   
decisions.  
  
Fact sheet for Healthcare Providers:   
https://www.fda.gov/media/873777/download  
Fact sheet for Patients:   
https://www.fda.gov/media/700085/download  
  
Methodology: Isothermal Nucleic Acid Amplification  
  
   
   
                      Specimen Description .NASOPHARYNGEAL SWAB                   
      [facility]  
Work   
Phone:   
[phone]  
   
                                                                  [facility]  
[facility]   
Phone:   
[phone]  
   
                                                    Comp Metabolic Profon 2021   
   
                      (cont.)               Normal                [facility]  
   
                                        Comment on above:   Result Comment: Aver  
age GFR for 20-29 years old:  
116 mL/min/1.73sq m  
Chronic Kidney Disease:  
<60 mL/min/1.73sq m  
Kidney failure:  
<15 mL/min/1.73sq m  
eGFR calculated using average adult body mass. Additional eGFR   
calculator  
available at:  
http://www.[employer]like/multiple_crcl_2012.htm   
   
                                                            Performed By: #### C  
WEN MARTIN, CP, HCG ####  
[facility]  
[address] [place] [address] [place] [address] [phone]  
: Haresh Zimmer MD   
   
                      Albumin [Mass/Vol] 4.4 g/dL   Normal     3.5-5.2    [facility]  
   
                                        Comment on above:   Performed By: #### C  
WEN MARTIN CP, HCG ####  
[facility]  
[address] [place] [address] [phone]  
: Haresh Zimmer MD   
   
                      Albumin/Glob Ratio 1.3        Normal     1.0-2.5    [facility]  
   
                                        Comment on above:   Performed By: #### C  
WEN MARTIN, CP, HCG ####  
[facility]  
[address] [place], [place] [address] [phone]  
: Haresh Zimmer MD   
   
                      Alkaline Phos 82 U/L     Normal          [facility]  
   
                                        Comment on above:   Performed By: #### C  
DP, SALI, CP, HCG ####  
[facility]  
[address] [place], [place] [address] [phone]  
: Haresh Zimmer MD   
   
                                                    ALT [Catalytic   
activity/Vol]   40 U/L          High            5-33            [facility]  
   
                                        Comment on above:   Performed By: #### C  
DP, SALI, CP, HCG ####  
[facility]  
[address] [place], [place] [address] [phone]  
: Haresh Zimmer MD   
   
                      Anion gap [Moles/Vol] 13 mmol/L  Normal     9-17       [facility]  
   
                                        Comment on above:   Performed By: #### C  
DP, SALI, CP, HCG ####  
[facility]  
[address] [place], [place] [address] [phone]  
: Haresh Zimmer MD   
   
                                                    AST [Catalytic   
activity/Vol]   26 U/L          Normal          <32             [facility]  
   
                                        Comment on above:   Performed By: #### C  
DP, SALI, CP, HCG ####  
[facility]  
[address] [place], [place] [address] [phone]  
: Haresh Zimmer MD   
   
                      Bilirubin [Mass/Vol] 0.39 mg/dL Normal     0.3-1.2    [facility]  
   
                                        Comment on above:   Performed By: #### C  
DP, SALI, CP, HCG ####  
[facility]  
[address] [place], [place] [address] [phone]  
: Haresh Zimmer MD   
   
                      BUN/CRE Ratio 12         Normal     9-20       [facility]  
   
                                        Comment on above:   Performed By: #### C  
DP, SALI, CP, HCG ####  
[facility]  
[address] [place], [place] [address] [phone]  
: Haresh Zimmer MD   
   
                      Calcium [Mass/Vol] 9.9 mg/dL  Normal     8.6-10.4   [facility]  
   
                                        Comment on above:   Performed By: #### C  
DP, SALI, CP, HCG ####  
[facility]  
[address] [place], [place] [address] [phone]  
: Haresh Zimmer MD   
   
                      Chloride [Moles/Vol] 99 mmol/L  Normal          [facility]  
   
                                        Comment on above:   Performed By: #### C  
DP, SALI, CP, HCG ####  
[facility]  
[address] [place], [place] [address] [phone]  
: Haresh Zimmer MD   
   
                      CO2 [Moles/Vol] 25 mmol/L  Normal     20-31      [facility]  
   
                                        Comment on above:   Performed By: #### C  
DP, SALI, CP, HCG ####  
[facility]  
[address] [place], [place] [address] [phone]  
: Haresh Zimmer MD   
   
                      Creatinine [Mass/Vol] 0.81 mg/dL Normal     0.50-0.90  [facility]  
   
                                        Comment on above:   Performed By: #### C  
DP, SALI, CP, HCG ####  
[facility]  
[address] [place], [place] [address] [phone]  
: Haresh Zimmer MD   
   
                      GFR, Amer >60        Normal     >60        [facility]  
   
                                        Comment on above:   Performed By: #### C  
DP, SALI, CP, HCG ####  
[facility]  
[address] [place], [place] [address] [phone]  
: Haresh Zimmer MD   
   
                      GFR,non  Amer >60        Normal     >60        [facility]  
   
                                        Comment on above:   Performed By: #### C  
DP, SALI, CP, HCG ####  
[facility]  
[address] [place], [place] [address] [phone]  
: Haresh Zimmer MD   
   
                      Glucose [Mass/Vol] 86 mg/dL   Normal     70-99      [facility]  
   
                                        Comment on above:   Performed By: #### C  
DP, SALI, CP, HCG ####  
[facility]  
[address] [place], [place] [address] [phone]  
: Haresh Zimmer MD   
   
                      Potassium [Moles/Vol] 3.7 mmol/L Normal     3.7-5.3    [facility]  
   
                                        Comment on above:   Performed By: #### C  
DP, SALI, CP, HCG ####  
[facility]  
[address] [place] [address] [address] [place], [place] [address] [phone]  
: Haresh Zimmer MD   
   
                      Protein [Mass/Vol] 7.8 g/dL   Normal     6.4-8.3    [facility]  
   
                                        Comment on above:   Performed By: #### C  
DP, SALI, CP, HCG ####  
[address] [place], [place] [address] [phone]  
: Haresh Zimmer MD   
   
                      Sodium [Moles/Vol] 137 mmol/L Normal     135-144    [facility]  
   
                                        Comment on above:   Performed By: #### C  
DP, SALI, CP, HCG ####  
[address] [place], [place] [address] [phone]  
: Haresh Zimmer MD   
   
                      Staging:              Normal                [facility]  
   
                                        Comment on above:   Result Comment: Stag  
e 1: Some kidney damage normal GFR  
Stage 2: Mild kidney damage GFR 60-89  
Stage 3: Moderate kidney damage GFR 30-59  
Stage 4: Severe kidney damage GFR 15-29  
Stage 5: Severe kidney damage GFR <15  
ESRD - chronic treatment by dialysis or transplant   
   
                                                            Performed By: #### C  
DP, SALI, CP, HCG ####  
[facility]  
[address] [place] [address] [place] [place], [place] [address] [phone]  
: Haresh Zimmer MD   
   
                                                    Urea nitrogen   
[Mass/Vol]      10 mg/dL        Normal          6-20            [facility]  
   
                                        Comment on above:   Performed By: #### C  
DP, SALI, CP, HCG ####  
[facility]  
[address] [place] [address] [place] [address] [phone]  
: Haresh Zimmer MD   
   
                                                    Comprehensive Metabolic Pane  
lOrdered By: Malika Spicerin on 2021   
   
                          Albumin [Mass/Vol] 4.4 g/dL                  3.5 - 5.2  
   
g/dL                                    [facility]   
Phone:   
[phone]  
   
                                                    Albumin/Globulin [Mass   
ratio]          1.3 {ratio}                                     [facility]   
Phone:   
[phone]  
   
                                                    ALP (Bld) [Catalytic   
activity/Vol]       82 U/L                                  35 - 104   
U/L                                     [facility]   
Phone:   
[phone]  
   
                                                    ALT [Catalytic   
activity/Vol]   40 U/L          High            5 - 33 U/L      [facility]   
Phone:   
[phone]  
   
                          Anion gap [Moles/Vol] 13 mmol/L                 9 - 17  
   
mmol/L                                  [facility]   
Phone:   
[phone]  
   
                                                    AST [Catalytic   
activity/Vol]   26 U/L                          <32             [facility]   
Phone:   
[phone]  
   
                          Bilirubin [Mass/Vol] 0.39 mg/dL                0.3 - 1  
.2   
mg/dL                                   [facility]   
Phone:   
[phone]  
   
                          Calcium [Mass/Vol] 9.9 mg/dL                 8.6 - 10.  
4   
mg/dL                                   [facility]   
Phone:   
[phone]  
   
                          Chloride [Moles/Vol] 99 mmol/L                 98 - 10  
7   
mmol/L                                  [facility]   
Phone:   
[phone]  
   
                          CO2 [Moles/Vol] 25 mmol/L                 20 - 31   
mmol/L                                  [facility]   
Phone:   
[phone]  
   
                          Creatinine [Mass/Vol] 0.81 mg/dL                0.50 -  
 0.90   
mg/dL                                   [facility]   
Phone:   
[phone]  
   
                                                    Free PSA/Total PSA   
[Mass fraction]     7.8 g/dL                                6.4 - 8.3   
g/dL                                    [facility]   
Phone:   
[phone]  
   
                      GFR  >60                   >60 mL/min [facility]  
y   
Health  
Work   
Phone:   
[phone]  
   
                                                    GFR Non-   
American        >60                             >60 mL/min      [facility]   
Phone:   
[phone]  
   
                          Glucose [Mass/Vol] 86 mg/dL                  70 - 99   
mg/dL                                   [facility]  
[facility]   
Phone:   
[phone]  
   
                          Potassium [Moles/Vol] 3.7 mmol/L                3.7 -   
5.3   
mmol/L                                  [facility]   
Phone:   
[phone]  
   
                          Sodium [Moles/Vol] 137 mmol/L                135 - 144  
   
mmol/L                                  [facility]   
Phone:   
[phone]  
   
                                                    Urea nitrogen (BldV)   
[Mass/Vol]          10 mg/dL                                6 - 20   
mg/dL                                   [facility]   
Phone:   
[phone]  
   
                                                    Urea   
nitrogen/Creatinine   
(Bld) [Mass ratio] 12                                              [facility]   
Phone:   
[phone]  
   
                                                    Drug Scr, Abuse, Uron 2021   
   
                      Amphetamine(s),Ur Negative   Normal     NEG        [facility]  
   
                                        Comment on above:   Performed By: #### C  
OVRB ####  
[facility]  
[address] [place] [address] [phone]  
: Haresh Zimmer MD   
   
                      Barbiturate(s),Ur Negative   Normal     NEG        [facility]  
   
                                        Comment on above:   Performed By: #### C  
OVRB ####  
[facility]  
[address] [place] [address] [phone]  
: Haresh Zimmer MD   
   
                      Benzodiazepine(s) Negative   Normal     [facility]  
   
                                        Comment on above:   Performed By: #### C  
OVRB ####  
[facility]  
[address] [place] [address] [phone]  
: Haresh Zimmer MD   
   
                      Buprenorphrine, Ur Negative   Normal     NEG        [facility]  
   
                                        Comment on above:   Performed By: #### C  
OVRB ####  
[facility]  
[address] [place] [address] [phone]  
: Haresh Zimmer MD   
   
                      Cannabinoid(s),Ur Positive   Abnormal   NEG        [facility]  
   
                                        Comment on above:   Performed By: #### C  
OVRB ####  
[facility]  
[address] [place] [address] [phone]  
: Haresh Zimmer MD   
   
                      Cocaine Metabolite Negative   Normal     [facility]  
   
                                        Comment on above:   Performed By: #### C  
OVRB ####  
[facility]  
[address] [place], [place] [address] [phone]  
: Haresh Zimmer MD   
   
                      Methadone Ql (U) Negative   Normal     [facility]  
   
                                        Comment on above:   Performed By: #### C  
OVRB ####  
[facility]  
[address] [place], [place] [address] [phone]  
: Haresh Zimmer MD   
   
                      Methamphetamine, Ur Negative   Normal     [facility]  
   
                                        Comment on above:   Performed By: #### C  
OVRB ####  
[facility]  
[address] [place], [place] [address] [phone]  
: Haresh Zimmer MD   
   
                      Opiate(s), Ur Negative   Normal     [facility]  
   
                                        Comment on above:   Performed By: #### C  
OVRB ####  
[facility]  
[address] [place], [place] [address] [phone]  
: Haresh Zimmer MD   
   
                      Oxycodone, Urine Negative   Normal     [facility]  
   
                                        Comment on above:   Performed By: #### C  
OVRB ####  
[facility]  
[address] [place], [place] [address] [phone]  
: Haresh Zimmer MD   
   
                      Phencyclidine, Ur Negative   Normal     [facility]  
   
                                        Comment on above:   Performed By: #### C  
OVRB ####  
[facility]  
[address] [place], [place] [address] [phone]  
: Haresh Zimmer MD   
   
                      Propoxyphene,Urine Negative   Normal     [facility]  
   
                                        Comment on above:   Performed By: #### C  
OVRB ####  
[facility]  
[address] [place], [place] [address] [phone]  
: Haresh Zimmer MD   
   
                                                    Tricyclic   
antidepressants Screen   
Ql (U)          Negative        Normal          [facility]  
   
                                        Comment on above:   Result Comment: Drug  
 screen results are to be used for medical  
  
purposes only. All positive  
results are unconfirmed. Testing for employment or legal uses   
should be sent  
to a reference laboratory for confirmation.   
   
                                                            Performed By: #### C  
OVRB ####  
[address] [place] [address] [phone]  
: Haresh Zimmer MD   
   
                      Interpretive Info NOT REPORTED Normal                [facility]  
   
                                        Comment on above:   Performed By: #### C  
OVRB ####  
[address] [place], [facility] [phone]  
: Haresh Zimmer MD   
   
                      MDMA, Urine NOT REPORTED Normal     NEG        [facility]  
   
                                        Comment on above:   Performed By: #### C  
OVRB ####  
[address] [place], [facility] [phone]  
: Haresh Zimmer MD   
   
                                                    EthanolOrdered By: Malika hernadez on 2021   
   
                      Ethanol [Mass/Vol] mg/dL                 <10 mg/dL  [facility]   
Phone:   
[phone]  
   
                      Ethanol percent <0.010                <0.010 %   [facility]  
[facility]   
Phone:   
[phone]  
   
                                                                  [facility]  
[facility]   
Phone:   
[phone]  
   
                                                    Ethanol Alcoholon 2021  
   
   
                      Ethanol [Mass/Vol] mg/dL      Normal     <10        [facility]  
   
                                        Comment on above:   Performed By: #### D  
AU ####  
[address] [place], [facility] [phone]  
: Haresh Zimmer MD   
   
                      Ethanol percent <0.010     Normal     <0.010     [facility]  
   
                                        Comment on above:   Performed By: #### D  
AU ####  
[address] [place] [address] [phone]  
: Haresh Zimmer MD   
   
                                                    HCG Qualitative, SerumOrdere  
d By: Malika Shepherd on 2021   
   
                      hCG Qual   Negative              NEGATIVE   [facility]  
[facility]   
Phone:   
[phone]  
   
                                        Comment on above:   Specimens with hCG l  
evels near the threshold of the test (25   
mIU/mL) may give a negative or  
indeterminate result. In such cases, another test should be   
performed with a new specimen  
in 48-72 hours. If early pregnancy is suspected clinically in   
this setting, correlation  
with quantitative serum b-hCG level is suggested.  
  
[employer] has confirmed the use of plasma for this   
test. This has not been cleared  
or approved by the U.S. Food and Drug Administration. The FDA   
has determined that such  
clearance is not necessary.  
  
   
   
                                                                  [facility]   
Phone:   
[phone]  
   
                                                    HCG Screen, Bloodon 20  
21   
   
                      HCG Screen, Blood Negative   Normal     NEG        [facility]  
   
                                        Comment on above:   Result Comment: Spec  
imens with hCG levels near the threshold   
of the test (25 mIU/mL) may give a  
negative or indeterminate result. In such cases, another test   
should be  
performed with a new specimen in 48-72 hours. If early   
pregnancy is suspected  
clinically in this setting, correlation with quantitative   
serum b-hCG level is  
suggested.  
[employer] has confirmed the use of plasma for this   
test. This has not  
been cleared or approved by the U.S. Food and Drug   
Administration. The FDA has  
determined that such clearance is not necessary.   
   
                                                            Performed By: #### C  
DP, SALI, CP, HCG ####  
[facility]  
[address] [place] [address] [place] [address] [phone]  
: Haresh Zimmer MD   
   
                                                    Laboratory - Chemistry and C  
hemistry - challengeOrdered By: Malika Shepherd on   
2021   
   
                                                    GFR/1.73 sq M.predicted   
MDRD (S/P/Bld) [Vol   
rate/Area]                                                      [facility]   
Phone:   
[phone]  
   
                                        Comment on above:   Average GFR for 20-2  
9 years old:  
116 mL/min/1.73sq m  
Chronic Kidney Disease:  
<60 mL/min/1.73sq m  
Kidney failure:  
<15 mL/min/1.73sq m  
  
  
eGFR calculated using average adult body mass. Additional eGFR   
calculator available at:  
  
http://www.[employer]/multiple_crcl_2012.htm  
  
  
   
   
                                                            Stage 1: Some kidney  
 damage normal GFR  
Stage 2: Mild kidney damage GFR 60-89  
Stage 3: Moderate kidney damage GFR 30-59  
Stage 4: Severe kidney damage GFR 15-29  
Stage 5: Severe kidney damage GFR <15  
ESRD - chronic treatment by dialysis or transplant  
  
  
   
   
                                                    Microscopic UrinalysisOrdere  
d By: Malika Shepherd on 2021   
   
                      -                                           "[employer]  
Work   
Phone:   
[phone]  
   
                      Amorphous, UA NOT REPORTED            None       "[employer]  
Work   
Phone:   
[phone]  
   
                      Bacteria, UA TRACE      Abnormal   None       [facility]   
Phone:   
[phone]  
   
                      Casts UA   NOT REPORTED            /LPF       [facility]  
Work   
Phone:   
[phone]  
   
                      Crystals, UA NOT REPORTED            None /HPF  [facility]  
Work   
Phone:   
[phone]  
   
                      Epithelial Cells UA 2 TO 5                           [facility]   
Phone:   
[phone]  
   
                                                    Interpretation and   
review of laboratory   
results         Abnormal                                        [facility]   
Phone:   
[phone]  
   
                      Mucus, UA  TRACE      Abnormal   None       [facility]   
Phone:   
[phone]  
   
                      Other Observations UA NOT REPORTED            NOT REQ.   M  
[facility]   
[facility]  
Work   
Phone:   
[phone]  
   
                      RBC, UA    0 TO 2                           "[employer]  
Work   
Phone:   
[phone]  
   
                      Renal Epithelial, UA NOT REPORTED            0 /HPF     Me  
[facility]   
[employer]  
Work   
Phone:   
[phone]  
   
                      Trichomonas, UA NOT REPORTED            None       [facility]   
Phone:   
[phone]  
   
                      WBC, UA    0 TO 2                           [facility]   
Phone:   
[phone]  
   
                      Yeast, UA  NOT REPORTED            None       [facility]   
Phone:   
[phone]  
   
                                                                  [facility]  
Work   
Phone:   
[phone]  
   
                                                    No Panel InformationOrdered   
By: Malika Shepherd on 2021   
   
                                                    Interpretation and   
review of laboratory   
results         Abnormal                                        [facility]   
Phone:   
[phone]  
   
                                                                  [facility]   
Phone:   
[phone]  
   
                                                    SARS-CoV-2on 2021   
   
                                                    SARS-CoV-2 (COVID-19)   
RNA PAMELA+probe Ql (Unsp   
spec)           Not detected    Normal          [facility]  
   
                                        Comment on above:   Result Comment:  
Rapid NAAT: The specimen is NEGATIVE for SARS-CoV-2, the novel   
coronavirus  
associated with COVID-19.  
The ID NOW COVID-19 assay is designed to detect the virus that   
causes COVID-19  
in patients with signs and symptoms of infection who are   
suspected of COVID-19.  
An individual without symptoms of COVID-19 and who is not   
shedding SARS-CoV-2  
virus would expect to have a negative (not detected) result in   
this assay.  
Negative results should be treated as presumptive and, if   
inconsistent with  
clinical signs and symptoms or necessary for patient   
management,  
should be tested with an alternative molecular assay. Negative   
results do not  
preclude SARS-CoV-2 infection and  
should not be used as the sole basis for patient management   
decisions.  
Fact sheet for Healthcare Providers:   
https://www.fda.gov/media/674490/download  
Fact sheet for Patients:   
https://www.fda.gov/media/046060/download  
Methodology: Isothermal Nucleic Acid Amplification   
   
                                                            Performed By: #### C  
OVRB ####  
[address] [place] [address] [phone]  
: Haresh Zimmer MD   
   
                                                    Salicylateon 4   
   
                      Salicylate <1         Low        3-10       [facility]  
   
                                        Comment on above:   Performed By: #### C  
DP, SALI, CP, HCG ####  
[address] [place] [address] [phone]  
: Haresh Zimmer MD   
   
                                                    SalicylateOrdered By: Malika matta on 2021   
   
                          Salicylate Lvl <1           Low          3 - 10   
mg/dL                                   [facility]  
Work   
Phone:   
[phone]  
   
                                                    UA w/Reflex Cultureon 2021   
   
                      Bilirubin, SemiQt,Ur Negative   Normal     NEG        [facility]  
   
                                        Comment on above:   Performed By: #### C  
OVRB ####  
[address] [place] [place] [address] [phone]  
: Haresh Zimmer MD   
   
                      Blood, Urine Negative   Normal     NEG        [facility]  
   
                                        Comment on above:   Performed By: #### C  
OVRB ####  
[address] [place] [place] [address] [phone]  
: Haresh Zimmer MD   
   
                      Clarity (U) CLEAR      Normal     CLEAR      [facility]  
   
                                        Comment on above:   Performed By: #### C  
OVRB ####  
[facility]  
[address] [place] [address] [phone]  
: Haresh Zimmer MD   
   
                      Color (U)  YELLOW     Normal     YEL        [facility]  
   
                                        Comment on above:   Performed By: #### C  
OVRB ####  
[facility]  
[address] [place] [address] [phone]  
: Haresh Zimmer MD   
   
                      Glucose Ql (U) Negative   Normal     NEG        [facility]  
   
                                        Comment on above:   Performed By: #### C  
OVRB ####  
[facility]  
[address] [place], [place] [address] [phone]  
: Haresh iZmmer MD   
   
                      Ketones Ql (U) Negative   Normal     NEG        [facility]  
   
                                        Comment on above:   Performed By: #### C  
OVRB ####  
[facility]  
[address] [place], [place] [address] [phone]  
: Haresh Zimmer MD   
   
                                                    Leukocyte esterase Test   
strip Ql (U)    Negative        Normal          NEG             [facility]  
   
                                        Comment on above:   Performed By: #### C  
OVRB ####  
[address] [place], [place] [address] [phone]  
: Haresh Zimmer MD   
   
                      Nitrite,Ur Negative   Normal     NEG        [facility]  
   
                                        Comment on above:   Performed By: #### C  
OVRB ####  
[facility]  
[address] [place], [place] [address] [phone]  
: Haresh Zimmer MD   
   
                      PH,Ur      8.5        Normal     5.0-9.0    [facility]  
   
                                        Comment on above:   Performed By: #### C  
OVRB ####  
[facility]  
[address] [place] [address] [phone]  
: Haresh Zimmer MD   
   
                      Protein Ql (U) Negative   Normal     [facility]  
   
                                        Comment on above:   Performed By: #### C  
OVRB ####  
[facility]  
[address] [place], [place] [address] [phone]  
: Haresh Zimmer MD   
   
                      Spec. Gravity,Ur 1.015      Normal     1.010-1.020 [facility]  
   
                                        Comment on above:   Performed By: #### C  
OVRB ####  
[facility]  
[address] [place], [place] [address] [phone]  
: Haresh Zimmer MD   
   
                      Urobilinogen,Ur Normal     Normal     NORM       [facility]  
   
                                        Comment on above:   Performed By: #### C  
OVRB ####  
[facility]  
[address] [place], [place] [address] [phone]  
: Haresh Zimmer MD   
   
                      Comment    NOT REPORTED Normal                [facility]  
   
                                        Comment on above:   Performed By: #### C  
OVRB ####  
[facility]  
[address] [place], [place] [address] [phone]  
: Haresh Zimmer MD   
   
                                                    Urinalysis Reflex to Culture  
Ordered By: Malika Shepherd on 2021   
   
                      Bilirubin Urine Negative              NEGATIVE   [facility]   
[employer]  
Work   
Phone:   
[phone]  
   
                      Color, UA  YELLOW                YELLOW     [facility]   
[employer]  
Work   
Phone:   
[phone]  
   
                      Glucose, Ur Negative              NEGATIVE   [facility]   
[employer]  
Work   
Phone:   
[phone]  
   
                      Ketones Ql (U) Negative              NEGATIVE   [facility]   
[employer]  
Work   
Phone:   
[phone]  
   
                                                    Leukocyte esterase Test   
strip Ql (U)    Negative                        NEGATIVE        [facility]   
[employer]  
Work   
Phone:   
[phone]  
   
                      Nitrite, Urine Negative              NEGATIVE   [facility]   
[employer]  
Work   
Phone:   
[phone]  
   
                      pH, UA     8.5                              [facility]   
[employer]  
Work   
Phone:   
[phone]  
   
                      Protein, UA Negative              NEGATIVE   [facility]   
[employer]  
Work   
Phone:   
[phone]  
   
                      Specific Gerry, UA 1.015                            [facility]   
[employer]  
Work   
Phone:   
[phone]  
   
                      Turbidity UA CLEAR                 CLEAR      [facility]   
[employer]  
Work   
Phone:   
[phone]  
   
                      Urinalysis Comments NOT REPORTED                       [facility]   
[employer]  
Work   
Phone:   
[phone]  
   
                      Urine Hgb  Negative              NEGATIVE   [facility]  
Work   
Phone:   
[phone]  
   
                      Urobilinogen, Urine Normal                Normal     [facility]  
Work   
Phone:   
[phone]  
   
                                                                  [facility]  
Work   
Phone:   
[phone]  
   
                                                    Urinalysis,Microon   
1   
   
                      -----                 Normal                [facility]  
   
                                        Comment on above:   Performed By: #### C  
OVRB ####  
[facility]  
[address] [place] [address] [address] [place], [place] [address] [phone]  
: Haresh Zimmer MD   
   
                      Bacteria   TRACE      Abnormal   [facility]  
   
                                        Comment on above:   Performed By: #### C  
OVRB ####  
[facility]  
[address] [place] [address] [address] [place], [place] [address] [phone]  
: Haresh Zimmer MD   
   
                                                    Epithelial cells LM Ql   
(Urine sed)     2 TO 5          Normal          0-25            [facility]  
   
                                        Comment on above:   Performed By: #### C  
OVRB ####  
[facility]  
[address] [place] [address] [address] [place], [place] [address] [phone]  
: Haresh Zimmer MD   
   
                      Mucus Strands TRACE      Abnormal   [facility]  
   
                                        Comment on above:   Performed By: #### C  
OVRB ####  
[address] [place] [address]  
[phone]  
: Haresh Zimmer MD   
   
                      Urine RBC's 0 TO 2     Normal     0-2        [facility]  
   
                                        Comment on above:   Performed By: #### C  
OVRB ####  
[facility]  
[address] [place] [address] [place] [address]  
[phone]  
: Haresh Zimmer MD   
   
                      Urine WBC's 0 TO 2     Normal     0-5        [facility]  
   
                                        Comment on above:   Performed By: #### C  
OVRB ####  
[facility]  
[address] [place] [address] [address] [place], [place] [address]83 [phone]  
: Haresh Zimmer MD   
   
                                                    Amorphous sediment LM   
Ql (Urine sed)  NOT REPORTED    Normal          [facility]  
   
                                        Comment on above:   Performed By: #### C  
OVRB ####  
[facility]  
[address] [place], [place] [address] [phone]  
: Haresh Zimmer MD   
   
                      Casts      NOT REPORTED Normal                [facility]  
   
                                        Comment on above:   Performed By: #### C  
OVRB ####  
[facility]  
[address] [place], [place] [address] [phone]  
: Haresh Zimmer MD   
   
                                                    Crystals LM Nom (Urine   
sed)            NOT REPORTED    Normal          [facility]  
   
                                        Comment on above:   Performed By: #### C  
OVRB ####  
[facility]  
[address] [place], [place] [address] [phone]  
: Haresh Zimmer MD   
   
                      Epithelial, Renal NOT REPORTED Normal     0          [facility]  
   
                                        Comment on above:   Performed By: #### C  
OVRB ####  
[facility]  
[address] [place], [place] [address] [phone]  
: Haresh Zimmer MD   
   
                      Other Observations NOT REPORTED Normal     NREQ       [facility]  
   
                                        Comment on above:   Performed By: #### C  
OVRB ####  
[facility]  
[address] [place], [place] [address] [phone]  
: Haresh Zimmer MD   
   
                      Trichomonas NOT REPORTED Normal     [facility]  
   
                                        Comment on above:   Performed By: #### C  
OVRB ####  
[facility]  
[address] [place], [place] [address] [phone]  
: Haresh Zimmer MD   
   
                      Yeast      NOT REPORTED Normal     NONE       [facility]  
   
                                        Comment on above:   Performed By: #### C  
OVRB ####  
[facility]  
[address] [place] [address] [phone]  
: Haresh Zimmer MD   
   
                                                    Urine Drug ScreenOrdered By:  
 Malika Shepherd on 2021   
   
                      Amphetamine Screen, Ur Negative              NEGATIVE   Me  
[facility]   
Health  
Work   
Phone:   
[phone]  
   
                      Barbiturate Screen, Ur Negative              NEGATIVE   Me  
[facility]   
Health  
Work   
Phone:   
[phone]  
   
                                                    Benzodiazepine Screen,   
Urine           Negative                        NEGATIVE        [facility]   
Health  
Work   
Phone:   
[phone]  
   
                      Buprenorphine Urine Negative              NEGATIVE   [facility]  
   
Health  
Work   
Phone:   
[phone]  
   
                      Cannabinoid Scrn, Ur Positive   Abnormal   NEGATIVE   [facility]  
y   
Health  
Work   
Phone:   
[phone]  
   
                                                    Cocaine Metabolite,   
Urine           Negative                        NEGATIVE        [facility]   
[employer]  
Work   
Phone:   
[phone]  
   
                                                    Interpretation and   
review of laboratory   
results         Abnormal                                        [facility]   
[employer]  
Work   
Phone:   
[phone]  
   
                      MDMA, Urine NOT REPORTED            NEGATIVE   [facility]   
[employer]  
Work   
Phone:   
[phone]  
   
                      Methadone Screen, Urine Negative              NEGATIVE   M  
[facility]   
Health  
Work   
Phone:   
[phone]  
   
                      Methamphetamine, Urine Negative              NEGATIVE   Me  
y   
[employer]  
Work   
Phone:   
[phone]  
   
                      Opiates, Urine Negative              NEGATIVE   [facility]   
[employer]  
Work   
Phone:   
[phone]  
   
                      Oxycodone Screen, Ur Negative              NEGATIVE   [facility]  
y   
Health  
Work   
Phone:   
[phone]  
   
                      Phencyclidine, Urine Negative              NEGATIVE   [facility]  
y   
Health  
Work   
Phone:   
[phone]  
   
                      Propoxyphene, Urine Negative              NEGATIVE   [facility]  
   
[employer]  
Work   
Phone:   
[phone]  
   
                      Test Information NOT REPORTED                       [facility]   
[employer]  
Work   
Phone:   
[phone]  
   
                                                    Tricyclic   
Antidepressants, Urine Negative                        NEGATIVE        [facility]   
[employer]  
Work   
Phone:   
[phone]  
   
                                        Comment on above:   Drug screen results   
are to be used for medical purposes only.   
All positive results are  
unconfirmed. Testing for employment or legal uses should be   
sent to a reference laboratory  
for confirmation.  
  
   
   
                                                                  "[facility] [facility]  
Work   
Phone:   
[phone]  
   
                                                    Laboratory - Microbiology an  
d Antimicrobial susceptibilityOrdered By: Doreen Cabrera on   
2021   
   
                                                    SARS-CoV-2 (COVID-19)   
RNA PAMELA+probe Ql (Resp) Not detected                            (Not   
Detected )                              Health   
[facility]   
[facility]  
Work   
Phone:   
[phone]  
   
                                        Comment on above:   Note: This nucleic a  
mallorie amplification test was developed and   
itsperformance characteristics determined by   
[facility]. Nucleic acid amplification tests include   
RT-PCR and TMA. This test has not been FDA cleared orapproved.   
This test has been authorized by FDA under anEmergency Use   
Authorization (EUA). This test is onlyauthorized for the   
duration of time the declaration thatcircumstances exist   
justifying the authorization of theemergency use of in vitro   
diagnostic tests for detection [id number] virus and/or   
diagnosis of COVID-19 infectionunder section 564(b)(1) of the   
Act, 21 U.S.C. 360bbb-3(b)(1), unless the authorization is   
terminated or revokedsooner.When diagnostic testing is   
negative, the possibility of afalse negative result should be   
considered in the contextof a patient's recent exposures and   
the presence ofclinical signs and symptoms consistent with   
COVID-19. Anindividual without symptoms of COVID-19 and who is   
notshedding SARS-CoV-2 virus would expect to have a   
negative(not detected) result in this assay.   
   
                                                    SARS-CoV-2 (COVID-19)   
RNA PAMELA+probe Ql (Unsp   
spec)           Performed                                       Health   
[place]   
[facility]  
Work   
Phone:   
[phone]  
   
                                                    Vitamin Don 2021   
   
                      Vitamin D  14.7 ng/mL Low        30.0-100.0 [facility]  
   
                                        Comment on above:   Result Comment: (<20  
 ng/mL) Deficiency  
This assay accurately quantifies the sum of vitamin D3,   
25-Hydroxy and  
vitamin D2, 25-Hyroxy.   
   
                                                            Performed By: #### V  
ITD ####  
[facility]  
[address] [place] [address]  
[phone]   
   
                                                    CBC With Platelet No Differe  
ntialon 2021   
   
                                                    Erythrocyte   
distribution width   
(RBC) [Ratio]   13.6 %          Normal          11.5-14.5       [facility]  
   
                                        Comment on above:   Performed By: #### C  
BCND ####  
[facility]  
[address] [place] [address]  
[phone]   
   
                                                    Hematocrit (Bld)   
[Volume fraction] 43.5 %          Normal          37.0-47.0       [facility]  
   
                                        Comment on above:   Performed By: #### C  
BCND ####  
[facility]  
[address] [place] [address]  
[phone]   
   
                                                    Hemoglobin (Bld)   
[Mass/Vol]      14.6 g/dL       Normal          12.0-16.0       [facility]  
   
                                        Comment on above:   Performed By: #### C  
BCND ####  
[facility]  
[address] [place] [address]  
[phone]   
   
                                                    MCH (RBC) [Entitic   
mass]           28.6 pg         Normal          27.0-31.3       [facility]  
   
                                        Comment on above:   Performed By: #### C  
BCND ####  
[facility]  
[address] [place] [address]  
[phone]   
   
                      MCHC       33.5 %     Normal     33.0-37.0  [facility]  
   
                                        Comment on above:   Performed By: #### C  
BCND ####  
[facility]  
[address] [place] [address]  
[phone]   
   
                      MCV (RBC) [Entitic vol] 85.4 fL    Normal     82.0-100.0 M  
[facility]  
   
                                        Comment on above:   Performed By: #### C  
BCND ####  
[facility]  
[address] [place] [address]  
[phone]   
   
                      Platelets (Bld) [#/Vol] 284 10*3/uL Normal     130-400      
[facility]  
   
                                        Comment on above:   Performed By: #### C  
BCND ####  
[facility]  
[address] [place] [address]  
[phone]   
   
                      RBC (Bld) [#/Vol] 5.10 10*6/uL Normal     4.20-5.40  [facility]  
   
                                        Comment on above:   Performed By: #### C  
BCND ####  
[facility]  
[address] [place] [address]  
[phone]   
   
                      WBC (Bld) [#/Vol] 11.1 10*3/uL Critically high 4.8-10.8     
[facility]  
   
                                        Comment on above:   Performed By: #### C  
BCND ####  
[facility]  
[address] [place] [address]  
[phone]   
   
                                                    Folateon 2021   
   
                      Folate     15.3 ng/mL Normal     7.3-26.1   [facility]  
   
                                        Comment on above:   Result Comment: As o  
f 8/10/16, the methodology has changed.   
Results from  
this methodology should not be compared with results from  
previous methodology.   
   
                                                            Performed By: #### F  
OLAT ####  
[facility]  
[address] [place] [address]  
[phone]   
   
                                                    Lipid Panelon 2021   
   
                      Cholesterol [Mass/Vol] 127 mg/dL  Normal     0-199      Me  
[facility]  
   
                                        Comment on above:   Result Comment: ATP   
III Cholesterol classification is   
Desirable.   
   
                                                            Performed By: #### L  
IPID ####  
[facility]  
[address] [place] [address]  
[phone]   
   
                                                    Cholesterol in HDL   
[Mass/Vol]      32 mg/dL        Low             40-59           [facility]  
   
                                        Comment on above:   Result Comment: ATP   
III HDL Cholestrol Classification is low.  
Expected Values:  
Males: >55 = No Risk  
35-55 = Moderate Risk  
<35 = High Risk  
Females: >65 = No Risk  
45-65 = Moderate Risk  
<45 = High Risk  
NCEP Guidelines: Third Report May 2001  
>59 = negative risk factor for CHD  
<40 = major risk factor for CHD   
   
                                                            Performed By: #### L  
IPID ####  
[facility]  
[address] [place] [address]  
[phone]   
   
                                                    Cholesterol in LDL   
[Mass/Vol]      75 mg/dL        Normal          0-129           [facility]  
   
                                        Comment on above:   Result Comment: ATP   
III LDL Classification is Optimal.   
   
                                                            Performed By: #### L  
IPID ####  
[facility]  
[address] [place] [address]  
[phone]   
   
                      Triglyceride [Mass/Vol] 100 mg/dL  Normal     0-150      M  
[facility]  
   
                                        Comment on above:   Result Comment: ATP   
III Triglycerides Classification is   
Normal.   
   
                                                            Performed By: #### L  
IPID ####  
[facility]  
[address] [place] [address]  
[phone]   
   
                                                    TSH w/out Reflexon    
   
                      TSH w/out Reflex 1.020 uIU/mL Normal     0.440-3.86 [facility]  
   
                                        Comment on above:   Performed By: #### T  
SH ####  
[facility]  
[address] [place] [address]  
[phone]   
   
                                                    Vitamin B12on 2021   
   
                                                    Cobalamin (Vitamin B12)   
[Mass/Vol]      1050 pg/mL      Normal          232-1245        [facility]  
   
                                        Comment on above:   Performed By: #### B  
12 ####  
[facility]  
[address] [place] [address] [phone]   
   
                                                    EKG 12 LeadOrdered By: Cuca Gallagher on 2021   
   
                      Atrial Rate 75                    BPM        [facility]   
Phone:   
[phone]  
   
                      P Axis     24                    degrees    [facility]   
Phone:   
[phone]  
   
                      P-R Interval 140 ms                           [facility]   
Phone:   
[phone]  
   
                      Q-T Interval 384 ms                           [facility]   
Phone:   
[phone]  
   
                      QRS Duration 90 ms                            [facility]   
Phone:   
[phone]  
   
                                                    QTc Calculation   
(Bazett)        414 ms                                          [facility]   
Phone:   
[phone]  
   
                      R Axis     29                    degrees    [facility]   
Phone:   
[phone]  
   
                      T Axis     20                    degrees    [facility]   
Phone:   
[phone]  
   
                      Ventricular Rate 70                    BPM        [facility]   
Phone:   
[phone]  
   
                                                            Normal sinus rhythm   
with   
sinus arrhythmia  
Possible Anterior infarct   
, age undetermined  
Abnormal ECG  
No previous ECGs   
available  
Confirmed by KRANTIH SU ([id number]) on   
2021 12:40:27 AM  
                                                            [facility]   
Phone:   
[phone]  
   
                                                            [place] Incoming E  
kg   
Results From [facility] [place] -   
2021 12:40 AM EDT   
Formatting of this note   
might be different from   
the original.  
Normal sinus rhythm with   
sinus arrhythmia  
Possible Anterior infarct   
, age undetermined  
Abnormal ECG  
No previous ECGs   
available  
Confirmed by KRANTHI SU ([id number]) on   
2021 12:40:27 AM                                         [facility]   
Phone:   
[phone]  
   
                                                                  [facility]   
Phone:   
[phone]  
   
                                                    Acetaminophenon 2021   
   
                                                    Acetaminophen   
[Mass/Vol]      ug/mL           Low             10-30           [facility]  
   
                                        Comment on above:   Performed By: #### A  
LCB, ACET ####  
[facility]  
[address] [place] [address] [phone]  
: Haresh Zimmer MD   
   
                                                    Acetaminophen LevelOrdered B  
y: Rafael Gallagher on 2021   
   
                          Acetaminophen Level <5           Low          10 - 30   
ug/mL                                   [facility]   
Phone:   
[phone]  
   
                                                    Interpretation and   
review of laboratory   
results         Abnormal                                        [facility]   
Phone:   
[phone]  
   
                                                                  [facility]   
Phone:   
[phone]  
   
                                                    CBC Auto DifferentialOrdered  
 By: Rafael Gallagher on 2021   
   
                      Absolute Eos # 0.07                             [facility]   
Phone:   
[phone]  
   
                                                    Absolute Immature   
Granulocyte     0.03                                            [facility]   
Phone:   
[phone]  
   
                      Absolute Lymph # 2.20                             [facility]   
Phone:   
[phone]  
   
                      Absolute Mono # 0.76                             [facility]   
Phone:   
[phone]  
   
                      Basophils (Bld) [#/Vol] 0.05 10*3/uL                        
 [facility]   
Phone:   
[phone]  
   
                      Basophils/100 WBC (Bld) 0 %                   0 - 2 %    M  
[facility]   
Phone:   
[phone]  
   
                      Differential Type NOT REPORTED                       [facility]   
Phone:   
[phone]  
   
                                                    Eosinophils/100 WBC   
(Bld)           1 %                             1 - 4 %         [facility]   
Phone:   
[phone]  
   
                                                    Hematocrit (Bld)   
[Volume fraction]   45.2 %                                  36.3 - 47.1   
%                                       [facility]   
Phone:   
[phone]  
   
                                                    Hemoglobin.gastrointest  
inal spec 1 Ql (Stl) 15.0 g/dL                               11.9 - 15.1   
g/dL                                    [facility]   
Phone:   
[phone]  
   
                                                    Immature   
granulocytes/100 WBC   
(Bld)           0 %                             0               [facility]   
Phone:   
[phone]  
   
                                                    Interpretation and   
review of laboratory   
results         Abnormal                                        [facility]   
Phone:   
[phone]  
   
                                                    Lymphocytes/100 WBC   
(Bld)           18 %            Low             24 - 43 %       [facility]   
Phone:   
[phone]  
   
                                                    MCH (RBC) [Entitic   
mass]               28.6 pg                                 25.2 - 33.5   
pg                                      [facility]   
Phone:   
[phone]  
   
                          MCHC (RBC) [Mass/Vol] 33.2 g/dL                 28.4 -  
 34.8   
g/dL                                    [facility]   
Phone:   
[phone]  
   
                          MCV (RBC) [Entitic vol] 86.3 fL                   82.6  
 -   
102.9 fL                                [facility]   
Phone:   
[phone]  
   
                      Monocytes/100 WBC (Bld) 6 %                   3 - 12 %   M  
[facility]   
Phone:   
[phone]  
   
                          NRBC Automated 0.0                       0.0 per 100   
WBC                                     [facility]   
Phone:   
[phone]  
   
                                                    Platelet distribution   
width (Bld) [Ratio] 13.0 %                                  11.8 - 14.4   
%                                       [facility]   
Phone:   
[phone]  
   
                      Platelet Estimate NOT REPORTED                       [facility]   
Phone:   
[phone]  
   
                                                    Platelet mean volume   
(Bld) [Entitic vol] 9.1 fL                                  8.1 - 13.5   
fL                                      [facility]   
Phone:   
[phone]  
   
                      Platelets (Bld) [#/Vol] 296 10*3/uL                         
[facility]   
Phone:   
[phone]  
   
                          RBC (Bld) [#/Vol] 5.24 10*6/uL High         3.95 - 5.1  
1   
m/uL                                    [facility]   
Phone:   
[phone]  
   
                      RBC (Bld) [#/Vol] NOT REPORTED                       [facility]   
Phone:   
[phone]  
   
                                                    Segmented   
neutrophils/100 WBC   
(Bld)           75 %            High            36 - 65 %       [facility]   
Phone:   
[phone]  
   
                      Segs Absolute 9.07       High                  [facility]   
Phone:   
[phone]  
   
                      WBC (Bld) [#/Vol] 12.2 10*3/uL High                  [facility]   
Phone:   
[phone]  
   
                      WBC (Bld) [#/Vol] NOT REPORTED                       [facility]   
Phone:   
[phone]  
   
                                                                  [facility]   
Phone:   
[phone]  
   
                                                    CBC with Diffon 2021   
   
                      Abs. Basophil 0.05 k/uL  Normal     0.00-0.20  [facility]  
   
                                        Comment on above:   Performed By: #### C  
OVRB ####  
[facility]  
[address] [place], [facility]83 [phone]  
: Haresh Zimmer MD   
   
                      Abs.Imm.Granulocyte 0.03 k/uL  Normal     0.00-0.30  [facility]  
   
                                        Comment on above:   Performed By: #### C  
OVRB ####  
[facility]  
[address] [place], [place] [address]  
[phone]  
: Haresh Zimmer MD   
   
                      Abs.Neutrophil (Seg) 9.07 k/uL  High       1.50-8.10  [facility]  
   
                                        Comment on above:   Performed By: #### C  
OVRB ####  
[address] [place], [facility]83 [phone]  
: Haresh Zimmer MD   
   
                      Basophils/100 WBC (Bld) 0 %        Normal     0-2        [facility]  
   
                                        Comment on above:   Performed By: #### C  
OVRB ####  
[address] [place], [place] [address]  
[phone]  
: Haresh Zimmer MD   
   
                                                    Eosinophils (Bld)   
[#/Vol]         0.07 10*3/uL    Normal          0.00-0.44       [facility]  
   
                                        Comment on above:   Performed By: #### C  
OVRB ####  
[facility]  
[address] [place], [place] [address]  
[phone]  
: Haresh Zimmer MD   
   
                                                    Eosinophils/100 WBC   
(Bld)           1 %             Normal          1-4             [facility]  
   
                                        Comment on above:   Performed By: #### C  
OVRB ####  
[address] [place], [facility]83 [phone]  
: Haresh Zimmer MD   
   
                                                    Erythrocyte   
distribution width   
(RBC) [Ratio]   13.0 %          Normal          11.8-14.4       [facility]  
   
                                        Comment on above:   Performed By: #### C  
OVRB ####  
[facility]  
[address] [place], [place] [address] [phone]  
: Haresh Zimmer MD   
   
                                                    Hematocrit (Bld)   
[Volume fraction] 45.2 %          Normal          36.3-47.1       [facility]  
   
                                        Comment on above:   Performed By: #### C  
OVRB ####  
[facility]  
[address] [place] [facility]83 [phone]  
: Haresh Zimmer MD   
   
                                                    Hemoglobin (Bld)   
[Mass/Vol]      15.0 g/dL       Normal          11.9-15.1       [facility]  
   
                                        Comment on above:   Performed By: #### C  
OVRB ####  
[address] [place] [facility]83 [phone]  
: Haresh Zimmer MD   
   
                                                    Immature   
granulocytes/100 WBC   
(Bld)           0 %             Normal          0               [facility]  
   
                                        Comment on above:   Performed By: #### C  
OVRB ####  
[address] [place] [facility]83 [phone]  
: Haresh Zimmer MD   
   
                                                    Lymphocytes (Bld)   
[#/Vol]         2.20 10*3/uL    Normal          1.10-3.70       [facility]  
   
                                        Comment on above:   Performed By: #### C  
OVRB ####  
[facility]  
[address] [place] [address] [address] [place] [facility]83 [phone]  
: Haresh Zimmer MD   
   
                                                    Lymphocytes/100 WBC   
(Bld)           18 %            Low             24-43           [facility]  
   
                                        Comment on above:   Performed By: #### C  
OVRB ####  
[facility]  
[address] [place] [address] [address] [place] [facility]83 [phone]  
: Haresh Zimmer MD   
   
                                                    MCH (RBC) [Entitic   
mass]           28.6 pg         Normal          25.2-33.5       [facility]  
   
                                        Comment on above:   Performed By: #### C  
OVRB ####  
[facility]  
[address] [place] [address] [address] [place] [facility]83 [phone]  
: Haresh Zimmer MD   
   
                      MCHC (RBC) [Mass/Vol] 33.2 g/dL  Normal     28.4-34.8  [facility]  
   
                                        Comment on above:   Performed By: #### C  
OVRB ####  
[address] [place], [place] [address] [phone]  
: Haresh Zimmer MD   
   
                      MCV (RBC) [Entitic vol] 86.3 fL    Normal     82.6-102.9 [facility]  
   
                                        Comment on above:   Performed By: #### C  
OVRB ####  
[address] [place], [place] [address] [phone]  
: Haresh Zimmer MD   
   
                      Monocytes (Bld) [#/Vol] 0.76 10*3/uL Normal     0.10-1.20   
 [facility]  
   
                                        Comment on above:   Performed By: #### C  
OVRB ####  
[address] [place], [place] [address] [phone]  
: Haresh Zimmer MD   
   
                      Monocytes/100 WBC (Bld) 6 %        Normal     3-12       [facility]  
   
                                        Comment on above:   Performed By: #### C  
OVRB ####  
[address] [place], [place] [address]483 [phone]  
: Haresh Zimmer MD   
   
                      Neutrophil (Seg) 75 %       High       36-65      [facility]  
   
                                        Comment on above:   Performed By: #### C  
OVRB ####  
[facility]  
[address] [place] [address] [address] [place], [place] [address] [phone]  
: Haresh Zimmer MD   
   
                      NRBC Automated 0.0 per 100 WBC Normal     0.0        [facility]  
   
                                        Comment on above:   Performed By: #### C  
OVRB ####  
[address] [place], [place] [address] [phone]  
: Haresh Zimmer MD   
   
                                                    Platelet mean volume   
(Bld) [Entitic vol] 9.1 fL          Normal          8.1-13.5        [facility]  
   
                                        Comment on above:   Performed By: #### C  
OVRB ####  
[facility]  
[address] [place], [place] [address] [phone]  
: Haresh Zimmer MD   
   
                      Platelets (Bld) [#/Vol] 296 10*3/uL Normal     138-453      
[facility]  
   
                                        Comment on above:   Performed By: #### C  
OVRB ####  
[facility]  
[address] [place], [place] [address] [phone]  
: Haresh Zimmer MD   
   
                      RBC (Bld) [#/Vol] 5.24 10*6/uL High       3.95-5.11  [facility]  
   
                                        Comment on above:   Performed By: #### C  
OVRB ####  
[facility]  
[address] [place] [address]  
[phone]  
: Haresh Zimmer MD   
   
                      WBC (Bld) [#/Vol] 12.2 10*3/uL High       3.5-11.3   [facility]  
   
                                        Comment on above:   Performed By: #### C  
OVRB ####  
[facility]  
[address] [place], [place] [address] [phone]  
: Haresh Zimmer MD   
   
                      Auto Diff Performed NOT REPORTED Normal                [facility]  
   
                                        Comment on above:   Performed By: #### C  
OVRB ####  
[facility]  
[address] [place], [place] [address] [phone]  
: Haresh Zimmer MD   
   
                      Platelet Estimate NOT REPORTED Normal                [facility]  
   
                                        Comment on above:   Performed By: #### C  
OVRB ####  
[facility]  
[address] [place], [place] [address] [phone]  
: Haresh Zimmer MD   
   
                                                    RBC morphology finding   
Nom (Bld)       NOT REPORTED    Normal                          [facility]  
   
                                        Comment on above:   Performed By: #### C  
OVRB ####  
[facility]  
[address] [place], [place] [address] [phone]  
: Haresh Zimmer MD   
   
                      WBC Morphology NOT REPORTED [facility]  
   
                                        Comment on above:   Performed By: #### C  
OVRB ####  
[facility]  
[address] [place] [address] [phone]  
: Haresh Zimmer MD   
   
                                                    COVID-19, RapidOrdered By: PIPO Gallagher on 2021   
   
                                                    SARS-CoV-2 (COVID-19)   
RNA PAMELA+probe Ql (Unsp   
spec)               Not detected                            Not   
Detected                                [facility]   
Phone:   
[phone]  
   
                                        Comment on above:     
Rapid NAAT: The specimen is NEGATIVE for SARS-CoV-2, the novel   
coronavirus associated with  
COVID-19.  
  
The ID NOW COVID-19 assay is designed to detect the virus that   
causes COVID-19 in patients  
with signs and symptoms of infection who are suspected of   
COVID-19.  
An individual without symptoms of COVID-19 and who is not   
shedding SARS-CoV-2 virus would  
expect to have a negative (not detected) result in this assay.  
Negative results should be treated as presumptive and, if   
inconsistent with clinical signs  
and symptoms or necessary for patient management,  
should be tested with an alternative molecular assay. Negative   
results do not preclude  
SARS-CoV-2 infection and  
should not be used as the sole basis for patient management   
decisions.  
  
Fact sheet for Healthcare Providers:   
https://www.fda.gov/media/998024/download  
Fact sheet for Patients:   
https://www.fda.gov/media/505300/download  
  
Methodology: Isothermal Nucleic Acid Amplification  
  
   
   
                      Specimen Description .NASOPHARYNGEAL SWAB                   
      [facility]   
Phone:   
[phone]  
   
                                                                  [facility]  
[facility]MP   
Phone:   
[phone]  
   
                                                    Comp Metabolic Profon 2021   
   
                      (cont.)               Normal                [facility]  
   
                                        Comment on above:   Result Comment: Aver  
age GFR for 20-29 years old:  
116 mL/min/1.73sq m  
Chronic Kidney Disease:  
<60 mL/min/1.73sq m  
Kidney failure:  
<15 mL/min/1.73sq m  
eGFR calculated using average adult body mass. Additional eGFR   
calculator  
available at:  
http://www.[employer]/multiple_crcl_2012.htm   
   
                                                            Performed By: #### D  
AU ####  
[facility]  
[address] [place], [place] [address] [phone]  
: Haresh Zimmer MD   
   
                      Albumin [Mass/Vol] 4.5 g/dL   Normal     3.5-5.2    [facility]  
   
                                        Comment on above:   Performed By: #### D  
AU ####  
[facility]  
[address] [place], [place] [address] [phone]  
: Haresh Zimmer MD   
   
                      Albumin/Glob Ratio 1.6        Normal     1.0-2.5    [facility]  
   
                                        Comment on above:   Performed By: #### D  
AU ####  
[facility]  
[address] [place], [place] [address] [phone]  
: Haresh Zimmer MD   
   
                      Alkaline Phos 72 U/L     Normal          [facility]  
   
                                        Comment on above:   Performed By: #### D  
AU ####  
[facility]  
[address] [place], [place] [address] [phone]  
: Haresh Zimmer MD   
   
                                                    ALT [Catalytic   
activity/Vol]   71 U/L          High            5-33            [facility]  
   
                                        Comment on above:   Performed By: #### D  
AU ####  
[facility]  
[address] [place] [address] [place] [place], [place] [address] [phone]  
: Haresh Zimmer MD   
   
                      Anion gap [Moles/Vol] 10 mmol/L  Normal     9-17       [facility]  
   
                                        Comment on above:   Performed By: #### D  
AU ####  
[facility]  
[address] [place], [place] [address] [phone]  
: Haresh Zimmer MD   
   
                                                    AST [Catalytic   
activity/Vol]   35 U/L          High            <32             [facility]  
   
                                        Comment on above:   Performed By: #### D  
AU ####  
[facility]  
[address] [place], [place] [address] [phone]  
: Haresh Zimmer MD   
   
                      Bilirubin [Mass/Vol] 0.34 mg/dL Normal     0.3-1.2    [facility]  
   
                                        Comment on above:   Performed By: #### D  
AU ####  
[facility]  
[address] [place], [place] [address] [phone]  
: Haresh Zimmer MD   
   
                      BUN/CRE Ratio 14         Normal     9-20       [facility]  
   
                                        Comment on above:   Performed By: #### D  
AU ####  
[facility]  
[address] [place], [place] [address] [phone]  
: Haresh Zimmer MD   
   
                      Calcium [Mass/Vol] 10.3 mg/dL Normal     8.6-10.4   [facility]  
   
                                        Comment on above:   Performed By: #### D  
AU ####  
[facility]  
[address] [place], [place] [address] [phone]  
: Haresh Zimmer MD   
   
                      Chloride [Moles/Vol] 103 mmol/L Normal          [facility]  
   
                                        Comment on above:   Performed By: #### D  
AU ####  
[facility]  
[address] [place], [place] [address] [phone]  
: Haresh Zimmer MD   
   
                      CO2 [Moles/Vol] 23 mmol/L  Normal     20-31      [facility]  
   
                                        Comment on above:   Performed By: #### D  
AU ####  
[facility]  
[address] [place], [place] [address] [phone]  
: Haresh Zimmer MD   
   
                      Creatinine [Mass/Vol] 0.65 mg/dL Normal     0.50-0.90  [facility]  
   
                                        Comment on above:   Performed By: #### D  
AU ####  
[facility]  
[address] [place], [place] [address] [phone]  
: Haresh Zimmer MD   
   
                      GFR, Amer >60        Normal     >60        [facility]  
   
                                        Comment on above:   Performed By: #### D  
AU ####  
[facility]  
[address] [place], [place] [address] [phone]  
: Haresh Zimmer MD   
   
                      GFR,non  Amer >60        Normal     >60        [facility]  
   
                                        Comment on above:   Performed By: #### D  
AU ####  
[facility]  
[address] [place], [place] [address] [phone]  
: Haresh Zimmer MD   
   
                      Glucose [Mass/Vol] 84 mg/dL   Normal     70-99      [facility]  
   
                                        Comment on above:   Performed By: #### D  
AU ####  
[facility]  
[address] [place], [place] [address] [phone]  
: Haresh Zimmer MD   
   
                      Potassium [Moles/Vol] 4.2 mmol/L Normal     3.7-5.3    [facility]  
   
                                        Comment on above:   Performed By: #### D  
AU ####  
[facility]  
[address] [place] [address] [place] [place], [place] [address] [phone]  
: Haresh Zimmer MD   
   
                      Protein [Mass/Vol] 7.4 g/dL   Normal     6.4-8.3    [facility]  
   
                                        Comment on above:   Performed By: #### D  
AU ####  
[address] [place], [place] [address] [phone]  
: Haresh Zimmer MD   
   
                      Sodium [Moles/Vol] 136 mmol/L Normal     135-144    [facility]  
   
                                        Comment on above:   Performed By: #### D  
AU ####  
[address] [place], [place] [address]83 [phone]  
: Haresh Zimmer MD   
   
                      Staging:              Normal                [facility]  
   
                                        Comment on above:   Result Comment: Stag  
e 1: Some kidney damage normal GFR  
Stage 2: Mild kidney damage GFR 60-89  
Stage 3: Moderate kidney damage GFR 30-59  
Stage 4: Severe kidney damage GFR 15-29  
Stage 5: Severe kidney damage GFR <15  
ESRD - chronic treatment by dialysis or transplant   
   
                                                            Performed By: #### D  
AU ####  
[facility]  
[address] [place] [address] [place] [place], [place] [address] [phone]  
: Haresh Zimmer MD   
   
                                                    Urea nitrogen   
[Mass/Vol]      9 mg/dL         Normal          6-20            [facility]  
   
                                        Comment on above:   Performed By: #### D  
AU ####  
[facility]  
[address] [place] [address] [place] [address] [phone]  
: Haresh Zimmer MD   
   
                                                    Comprehensive Metabolic Pane  
lOrdered By: Rafael Gallagher on 2021   
   
                          Albumin [Mass/Vol] 4.5 g/dL                  3.5 - 5.2  
   
g/dL                                    [facility]   
Phone:   
[phone]  
   
                                                    Albumin/Globulin [Mass   
ratio]          1.6 {ratio}                                     [facility]   
Phone:   
[phone]  
   
                                                    ALP (Bld) [Catalytic   
activity/Vol]       72 U/L                                  35 - 104   
U/L                                     [facility]   
Phone:   
[phone]  
   
                                                    ALT [Catalytic   
activity/Vol]   71 U/L          High            5 - 33 U/L      [facility]   
Phone:   
[phone]  
   
                          Anion gap [Moles/Vol] 10 mmol/L                 9 - 17  
   
mmol/L                                  [facility]   
Phone:   
[phone]  
   
                                                    AST [Catalytic   
activity/Vol]   35 U/L          High            <32             [facility]   
Phone:   
[phone]  
   
                          Bilirubin [Mass/Vol] 0.34 mg/dL                0.3 - 1  
.2   
mg/dL                                   [facility]   
Phone:   
[phone]  
   
                          Calcium [Mass/Vol] 10.3 mg/dL                8.6 - 10.  
4   
mg/dL                                   [facility]   
Phone:   
[phone]  
   
                          Chloride [Moles/Vol] 103 mmol/L                98 - 10  
7   
mmol/L                                  [facility]   
Phone:   
[phone]  
   
                          CO2 [Moles/Vol] 23 mmol/L                 20 - 31   
mmol/L                                  [facility]   
Phone:   
[phone]  
   
                          Creatinine [Mass/Vol] 0.65 mg/dL                0.50 -  
 0.90   
mg/dL                                   [facility]   
Phone:   
[phone]  
   
                                                    Free PSA/Total PSA   
[Mass fraction]     7.4 g/dL                                6.4 - 8.3   
g/dL                                    [facility]   
Phone:   
[phone]  
   
                      GFR  >60                   >60 mL/min [facility]  
y   
Health  
Work   
Phone:   
[phone]  
   
                                                    GFR Non-   
American        >60                             >60 mL/min      [facility]   
Phone:   
[phone]  
   
                          Glucose [Mass/Vol] 84 mg/dL                  70 - 99   
mg/dL                                   [facility]   
Phone:   
[phone]  
   
                          Potassium [Moles/Vol] 4.2 mmol/L                3.7 -   
5.3   
mmol/L                                  [facility]   
Phone:   
[phone]  
   
                          Sodium [Moles/Vol] 136 mmol/L                135 - 144  
   
mmol/L                                  [facility]   
Phone:   
[phone]  
   
                                                    Urea nitrogen (BldV)   
[Mass/Vol]          9 mg/dL                                 6 - 20   
mg/dL                                   [facility]   
Phone:   
[phone]  
   
                                                    Urea   
nitrogen/Creatinine   
(Bld) [Mass ratio] 14                                              [facility]   
Phone:   
[phone]  
   
                                                    Drug Scr, Abuse, Uron 2021   
   
                      Amphetamine(s),Ur Negative   Normal     NEG        [facility]  
   
                                        Comment on above:   Performed By: #### C  
OVRB ####  
[facility]  
[address] [place] [address] [place] [address] [phone]  
: Haresh Zimmer MD   
   
                      Barbiturate(s),Ur Negative   Normal     [facility]  
   
                                        Comment on above:   Performed By: #### C  
OVRB ####  
[address] [place] [address] [phone]  
: Haresh Zimmer MD   
   
                      Benzodiazepine(s) Negative   Normal     NEG        [facility]  
   
                                        Comment on above:   Performed By: #### C  
OVRB ####  
[facility]  
[address] [place], [place] [address] [phone]  
: Haresh Zimmer MD   
   
                      Buprenorphrine, Ur Negative   Normal     NEG        [facility]  
   
                                        Comment on above:   Performed By: #### C  
OVRB ####  
[facility]  
[address] [place] [address] [phone]  
: Haresh Zimmer MD   
   
                      Cannabinoid(s),Ur Positive   Abnormal   NEG        [facility]  
   
                                        Comment on above:   Performed By: #### C  
OVRB ####  
[facility]  
[address] [place] [address]  
[phone]  
: Haresh Zimmer MD   
   
                      Cocaine Metabolite Negative   Normal     [facility]  
   
                                        Comment on above:   Performed By: #### C  
OVRB ####  
[facility]  
[address] [place] [address]  
[phone]  
: Haresh Zimmer MD   
   
                      Methadone Ql (U) Negative   Normal     NEG        [facility]  
   
                                        Comment on above:   Performed By: #### C  
OVRB ####  
[facility]  
[address] [place], [place] [address] [phone]  
: Haersh Zimmer MD   
   
                      Methamphetamine, Ur Negative   Normal     NEG        [facility]  
   
                                        Comment on above:   Performed By: #### C  
OVRB ####  
[facility]  
[address] [place], [place] [address] [phone]  
: Haresh Zimmer MD   
   
                      Opiate(s), Ur Negative   Normal     [facility]  
   
                                        Comment on above:   Performed By: #### C  
OVRB ####  
[facility]  
[address] [place] [address]  
[phone]  
: Haresh Zimmer MD   
   
                      Oxycodone, Urine Negative   Normal     [facility]  
   
                                        Comment on above:   Performed By: #### C  
OVRB ####  
[facility]  
[address] [place], [place] [address] [phone]  
: Haresh Zimmer MD   
   
                      Phencyclidine, Ur Negative   Normal     NEG        [facility]  
   
                                        Comment on above:   Performed By: #### C  
OVRB ####  
[facility]  
[address] [place], [place] [address] [phone]  
: Haresh Zimmer MD   
   
                      Propoxyphene,Urine Negative   Normal     NEG        [facility]  
   
                                        Comment on above:   Performed By: #### C  
OVRB ####  
[facility]  
[address] [place], [place] [address] [phone]  
: Haresh Zimmer MD   
   
                                                    Tricyclic   
antidepressants Screen   
Ql (U)          Negative        Normal          NEG             [facility]  
   
                                        Comment on above:   Result Comment: Drug  
 screen results are to be used for medical  
  
purposes only. All positive  
results are unconfirmed. Testing for employment or legal uses   
should be sent  
to a reference laboratory for confirmation.   
   
                                                            Performed By: #### C  
OVRB ####  
[facility]  
[address] [place] [address] [place] [address] [phone]  
: Haresh Zimmer MD   
   
                      Interpretive Info NOT REPORTED Normal                [facility]  
   
                                        Comment on above:   Performed By: #### C  
OVRB ####  
[facility]  
[address] [place] [address] [place] [address] [phone]  
: Haresh Zimmer MD   
   
                                                    Drug Scr, Abuse, UrOrdered B  
y: Rafaelpeter Gallagher on 2021   
   
                      MDMA, Urine NOT REPORTED Normal     NEG        [facility]  
[facility]   
Phone:   
[phone]  
   
                                        Comment on above:   Performed By: #### C  
OVRB ####  
[address] [place] [address] [phone]  
: Haresh Zimmer MD   
   
                                                    EthanolOrdered By: Rafael valdivia on 2021   
   
                      Ethanol [Mass/Vol] mg/dL                 <10 mg/dL  [facility]  
[facility]   
Phone:   
[phone]  
   
                      Ethanol percent <0.010                <0.010 %   [facility]   
[facility]   
Phone:   
[phone]  
   
                                                                  [facility]   
Phone:   
[phone]  
   
                                                    Ethanol Alcoholon 2021  
   
   
                      Ethanol [Mass/Vol] mg/dL      Normal     <10        [facility]  
   
                                        Comment on above:   Performed By: #### A  
[facility], ACET ####  
[address] [place] [address] [phone]  
: Haresh Zimmer MD   
   
                      Ethanol percent <0.010     Normal     <0.010     [facility]  
   
                                        Comment on above:   Performed By: #### A  
[facility] ####  
[address] [place] [address] [phone]  
: Haresh Zimmer MD   
   
                                                    HCG Qualitative, SerumOrdere  
d By: Rafael Gallagher on 2021   
   
                      hCG Qual   Negative              NEGATIVE   [facility]   
Phone:   
[phone]  
   
                                        Comment on above:   Specimens with hCG l  
evels near the threshold of the test (25   
mIU/mL) may give a negative or  
indeterminate result. In such cases, another test should be   
performed with a new specimen  
in 48-72 hours. If early pregnancy is suspected clinically in   
this setting, correlation  
with quantitative serum b-hCG level is suggested.  
  
[employer] [employer] has confirmed the use of plasma for this   
test. This has not been cleared  
or approved by the U.S. Food and Drug Administration. The FDA   
has determined that such  
clearance is not necessary.  
  
   
   
                                                                  [facility]   
Phone:   
[phone]  
   
                                                    HCG Screen, Bloodon 20  
21   
   
                      HCG Screen, Blood Negative   Normal     NEG        [facility]  
   
                                        Comment on above:   Result Comment: Spec  
imens with hCG levels near the threshold   
of the test (25 mIU/mL) may give a  
negative or indeterminate result. In such cases, another test   
should be  
performed with a new specimen in 48-72 hours. If early   
pregnancy is suspected  
clinically in this setting, correlation with quantitative   
serum b-hCG level is  
suggested.  
[employer] [employer] has confirmed the use of plasma for this   
test. This has not  
been cleared or approved by the U.S. Food and Drug   
Administration. The FDA has  
determined that such clearance is not necessary.   
   
                                                            Performed By: #### D  
AU ####  
[facility]  
[address] [place] [address] [place] [address]  
[phone]  
: Haresh Zimmer MD   
   
                                                    Laboratory - Chemistry and C  
hemistry - challengeOrdered By: Rafael Gallagher on   
2021   
   
                                                    GFR/1.73 sq M.predicted   
MDRD (S/P/Bld) [Vol   
rate/Area]                                                      [facility]   
Phone:   
[phone]  
   
                                        Comment on above:   Average GFR for 20-2  
9 years old:  
116 mL/min/1.73sq m  
Chronic Kidney Disease:  
<60 mL/min/1.73sq m  
Kidney failure:  
<15 mL/min/1.73sq m  
  
  
eGFR calculated using average adult body mass. Additional eGFR   
calculator available at:  
  
http://www.[employer]/multiple_crcl_2012.htm  
  
  
   
   
                                                            Stage 1: Some kidney  
 damage normal GFR  
Stage 2: Mild kidney damage GFR 60-89  
Stage 3: Moderate kidney damage GFR 30-59  
Stage 4: Severe kidney damage GFR 15-29  
Stage 5: Severe kidney damage GFR <15  
ESRD - chronic treatment by dialysis or transplant  
  
  
   
   
                                                    No Panel InformationOrdered   
By: Rafael Gallagher on 2021   
   
                                                    Interpretation and   
review of laboratory   
results         Abnormal                                        [facility]   
Phone:   
[phone]  
   
                                                                  [facility]   
Phone:   
[phone]  
   
                                                    SARS-CoV-2on 2021   
   
                                                    SARS-CoV-2 (COVID-19)   
RNA PAMELA+probe Ql (Unsp   
spec)           Not detected    Normal          [facility]  
   
                                        Comment on above:   Result Comment:  
Rapid NAAT: The specimen is NEGATIVE for SARS-CoV-2, the novel   
coronavirus  
associated with COVID-19.  
The ID NOW COVID-19 assay is designed to detect the virus that   
causes COVID-19  
in patients with signs and symptoms of infection who are   
suspected of COVID-19.  
An individual without symptoms of COVID-19 and who is not   
shedding SARS-CoV-2  
virus would expect to have a negative (not detected) result in   
this assay.  
Negative results should be treated as presumptive and, if   
inconsistent with  
clinical signs and symptoms or necessary for patient   
management,  
should be tested with an alternative molecular assay. Negative   
results do not  
preclude SARS-CoV-2 infection and  
should not be used as the sole basis for patient management   
decisions.  
Fact sheet for Healthcare Providers:   
https://www.fda.gov/media/642461/download  
Fact sheet for Patients:   
https://www.fda.gov/media/900584/download  
Methodology: Isothermal Nucleic Acid Amplification   
   
                                                            Performed By: #### C  
OVRB ####  
[address] [place] [address] [phone]  
: Haresh Zimmer MD   
   
                                                    SPECIMEN REJECTIONOrdered By  
: Rafael Gallagher on 2021   
   
                      -          NOT REPORTED                       [facility]   
[facility]   
Phone:   
[phone]  
   
                      Ordered Test CDP                              [facility]  
[facility]   
Phone:   
[phone]  
[phone]  
   
                                        Reason for Rejection Unable to perform   
testing: No specimen   
received.                                                   [facility]  
Work   
Phone:   
[phone]  
   
                                                    Specimen source Nom   
(Unsp spec)     .BLOOD                                          [facility]  
Work   
Phone:   
[phone]  
   
                                                                  [facility]  
Work   
Phone:   
[phone]  
   
                                                    Salicylateon 2021   
   
                      Salicylate <1         Low        3-10       [facility]  
   
                                        Comment on above:   Performed By: #### D  
AU ####  
[facility]  
[address] [place] [address] [phone]  
: Haresh Zimmer MD   
   
                                                    SalicylateOrdered By: Rafael Gallagher on 2021   
   
                          Salicylate Lvl <1           Low          3 - 10   
mg/dL                                   [facility]  
[facility]   
Phone:   
[phone]  
   
                                                    Specimen Rejectionon   
021   
   
                                        Reason for rejection Unable to perform   
testing: No specimen   
received.           Normal                                  [facility]  
   
                                        Comment on above:   Performed By: #### C  
OVRB ####  
[facility]  
[address] [place] [address] [place] [place], [place] [address] [phone]  
: Haresh Zimmer MD   
   
                      Source of sample .BLOOD     Normal                [facility]  
   
                                        Comment on above:   Performed By: #### C  
OVRB ####  
[address] [place] [address] [phone]  
: Haresh Zimmer MD   
   
                      Test ordered CDP        [facility]  
   
                                        Comment on above:   Performed By: #### C  
OVRB ####  
[facility]  
[address] [place] [address] [phone]  
: Haresh Zimmer MD   
   
                      -----      NOT REPORTED [facility]  
   
                                        Comment on above:   Performed By: #### C  
OVRB ####  
[facility]  
[address] [place] [address] [phone]  
: Haresh Zimmer MD   
   
                                                    Urinalysis w/ Microon 2021   
   
                      -----                 Normal                [facility]  
   
                                        Comment on above:   Performed By: #### C  
OVRB ####  
[facility]  
[address] [place], [place] [address] [phone]  
: Haresh Zimmer MD   
   
                      Bacteria   1+         Abnormal   NONE       [facility]  
   
                                        Comment on above:   Performed By: #### C  
OVRB ####  
[facility]  
[address] [place], [place] [address] [phone]  
: Haresh Zimmer MD   
   
                      Bilirubin, SemiQt,Ur Negative   Normal     NEG        [facility]  
   
                                        Comment on above:   Performed By: #### C  
OVRB ####  
[facility]  
[address] [place], [place] [address] [phone]  
: Haresh Zimmer MD   
   
                      Blood, Urine Negative   Normal     NEG        [facility]  
   
                                        Comment on above:   Performed By: #### C  
OVRB ####  
[facility]  
[address] [place], [place] [address] [phone]  
: Haresh Zimmer MD   
   
                      Clarity (U) CLEAR      Normal     CLEAR      [facility]  
   
                                        Comment on above:   Performed By: #### C  
OVRB ####  
[facility]  
[address] [place], [place] [address] [phone]  
: Haresh Zimmer MD   
   
                      Color (U)  YELLOW     Normal     YEL        [facility]  
   
                                        Comment on above:   Performed By: #### C  
OVRB ####  
[facility]  
[address] [place], [place] [address] [phone]  
: Haresh Zimmer MD   
   
                                                    Epithelial cells LM Ql   
(Urine sed)     5 TO 10         Normal          0-25            [facility]  
   
                                        Comment on above:   Performed By: #### C  
OVRB ####  
[facility]  
[address] [place] [address] [phone]  
: Haresh Zimmer MD   
   
                      Glucose Ql (U) Negative   Normal     NEG        [facility]  
   
                                        Comment on above:   Performed By: #### C  
OVRB ####  
[facility]  
[address] [place] [address] [phone]  
: Haresh Zimmer MD   
   
                      Ketones Ql (U) Negative   Normal     NEG        [facility]  
   
                                        Comment on above:   Performed By: #### C  
OVRB ####  
[facility]  
[address] [place] [address] [phone]  
: Haresh Zimmer MD   
   
                                                    Leukocyte esterase Test   
strip Ql (U)    Negative        Normal          NEG             [facility]  
   
                                        Comment on above:   Performed By: #### C  
OVRB ####  
[facility]  
[address] [place], [place] [address] [phone]  
: Haresh Zimmer MD   
   
                      Nitrite,Ur Negative   Normal     NEG        [facility]  
   
                                        Comment on above:   Performed By: #### C  
OVRB ####  
[facility]  
[address] [place] [address] [place] [address] [phone]  
: Haresh Zimmer MD   
   
                      PH,Ur      5.5        Normal     5.0-9.0    [facility]  
   
                                        Comment on above:   Performed By: #### C  
OVRB ####  
[facility]  
[address] [place], [place] [address] [phone]  
: Haresh Zimmer MD   
   
                      Protein Ql (U) Negative   Normal     NEG        [facility]  
   
                                        Comment on above:   Performed By: #### C  
OVRB ####  
[facility]  
[address] [place], [place] [address] [phone]  
: Haresh Zimmer MD   
   
                      Spec. Gravity,Ur 1.025      High       1.010-1.020 [facility]  
   
                                        Comment on above:   Performed By: #### C  
OVRB ####  
[facility]  
[address] [place], [place] [address] [phone]  
: Haresh Zimmer MD   
   
                      Urine RBC's None       Normal     0-2        [facility]  
   
                                        Comment on above:   Performed By: #### C  
OVRB ####  
[facility]  
[address] [place] [address] [phone]  
: Haresh Zimmer MD   
   
                      Urine WBC's 0 TO 2     Normal     0-5        [facility]  
   
                                        Comment on above:   Performed By: #### C  
OVRB ####  
[facility]  
[address] [place], [place] [address] [phone]  
: Haresh Zimmer MD   
   
                      Urobilinogen,Ur Normal     Normal     NORM       [facility]  
   
                                        Comment on above:   Performed By: #### C  
OVRB ####  
[facility]  
[address] [place], [place] [address] [phone]  
: Haresh Zimmer MD   
   
                                                    Amorphous sediment LM   
Ql (Urine sed)  NOT REPORTED    Normal          NONE            [facility]  
   
                                        Comment on above:   Performed By: #### C  
OVRB ####  
[facility]  
[address] [place], [place] [address] [phone]  
: Haresh Zimmer MD   
   
                      Casts      NOT REPORTED Normal                [facility]  
   
                                        Comment on above:   Performed By: #### C  
OVRB ####  
[facility]  
[address] [place], [place] [address] [phone]  
: Haresh Zimmer MD   
   
                      Comment    NOT REPORTED Normal                [facility]  
   
                                        Comment on above:   Performed By: #### C  
OVRB ####  
[facility]  
[address] [place], [place] [address] [phone]  
: Haresh Zimmer MD   
   
                                                    Crystals LM Nom (Urine   
sed)            NOT REPORTED    Normal          NONE            [facility]  
   
                                        Comment on above:   Performed By: #### C  
OVRB ####  
[facility]  
[address] [place], [place] [address] [phone]  
: Haresh Zimmer MD   
   
                      Epithelial, Renal NOT REPORTED Normal     0          [facility]  
   
                                        Comment on above:   Performed By: #### C  
OVRB ####  
[facility]  
[address] [place], [place] [address] [phone]  
: Haresh Zimmer MD   
   
                      Mucus Strands NOT REPORTED Normal     NONE       [facility]  
   
                                        Comment on above:   Performed By: #### C  
OVRB ####  
[facility]  
[address] [place], [place] [address] [phone]  
: Haresh Zimmer MD   
   
                      Other Observations NOT REPORTED Normal     NREQ       [facility]  
   
                                        Comment on above:   Performed By: #### C  
OVRB ####  
[facility]  
[address] [place] [address] [phone]  
: Haresh Zimmer MD   
   
                      Trichomonas NOT REPORTED Normal     [facility]  
   
                                        Comment on above:   Performed By: #### C  
OVRB ####  
[facility]  
[address] [place] [address] [phone]  
: Haresh Zimmer MD   
   
                      Yeast      NOT REPORTED Normal     [facility]  
   
                                        Comment on above:   Performed By: #### C  
OVRB ####  
[facility]  
[address] [place] [address] [phone]  
: Haresh Zimmer MD   
   
                                                    Urinalysis with microscopicO  
rdered By: Seth Rahman on 2021   
   
                      -                                           [facility]   
Phone:   
[phone]  
   
                      Amorphous, UA NOT REPORTED            None       [facility]   
Phone:   
[phone]  
   
                      Bacteria, UA 1+         Abnormal   None       [facility]   
Phone:   
[phone]  
   
                      Bilirubin Urine Negative              NEGATIVE   [facility]   
Phone:   
[phone]  
   
                      Casts UA   NOT REPORTED            /LPF       [facility]   
Phone:   
[phone]  
   
                      Color, UA  YELLOW                YELLOW     [facility]   
Phone:   
[phone]  
   
                      Crystals, UA NOT REPORTED            None /HPF  [facility]   
Phone:   
[phone]  
   
                      Epithelial Cells UA 5 TO 10                          [facility]   
Phone:   
[phone]  
   
                      Glucose, Ur Negative              NEGATIVE   [facility]   
Phone:   
[phone]  
   
                                                    Interpretation and   
review of laboratory   
results         Abnormal                                        [facility]   
Phone:   
[phone]  
   
                      Ketones Ql (U) Negative              NEGATIVE   [facility]   
Phone:   
[phone]  
   
                                                    Leukocyte esterase Test   
strip Ql (U)    Negative                        NEGATIVE        [facility]   
Phone:   
[phone]  
   
                      Mucus, UA  NOT REPORTED            None       [facility]   
Phone:   
[phone]  
   
                      Nitrite, Urine Negative              NEGATIVE   [facility]   
[employer]  
Work   
Phone:   
[phone]  
   
                      Other Observations UA NOT REPORTED            NOT REQ.   [facility]  
[facility]   
[employer]  
Work   
Phone:   
[phone]  
   
                      pH, UA     5.5                              [facility]   
[employer]  
Work   
Phone:   
[phone]  
   
                      Protein, UA Negative              NEGATIVE   [facility]   
[employer]  
Work   
Phone:   
[phone]  
   
                      RBC, UA    None                             [facility]  
Work   
Phone:   
[phone]  
   
                      Renal Epithelial, UA NOT REPORTED            0 /HPF     Me  
y   
[employer]  
Work   
Phone:   
[phone]  
   
                      Specific Gerry, UA 1.025      High                  [facility]  
[facility]  
Work   
Phone:   
[phone]  
   
                      Trichomonas, UA NOT REPORTED            None       [facility]  
Work   
Phone:   
[phone]  
   
                      Turbidity UA CLEAR                 CLEAR      [facility]  
Work   
Phone:   
[phone]  
   
                      Urinalysis Comments NOT REPORTED                       [facility]   
[employer]  
Work   
Phone:   
[phone]  
   
                      Urine Hgb  Negative              NEGATIVE   [facility]   
[employer]  
Work   
Phone:   
[phone]  
   
                      Urobilinogen, Urine Normal                Normal     [facility]  
   
[employer]  
Work   
Phone:   
[phone]  
   
                      WBC, UA    0 TO 2                           [facility]  
Work   
Phone:   
[phone]  
   
                      Yeast, UA  NOT REPORTED            None       [facility]   
[employer]  
Work   
Phone:   
[phone]  
   
                                                                  [facility]  
Work   
Phone:   
[phone]  
   
                                                    Urine Drug ScreenOrdered By:  
 Rafael Gallagher on 2021   
   
                      Amphetamine Screen, Ur Negative              NEGATIVE   [facility]   
[employer]  
Work   
Phone:   
[phone]  
   
                      Barbiturate Screen, Ur Negative              NEGATIVE   [facility]   
[employer]  
Work   
Phone:   
[phone]  
   
                                                    Benzodiazepine Screen,   
Urine           Negative                        NEGATIVE        [facility]  
Work   
Phone:   
[phone]  
   
                      Buprenorphine Urine Negative              NEGATIVE   [facility]  
   
[employer]  
Work   
Phone:   
[phone]  
   
                      Cannabinoid Scrn, Ur Positive   Abnormal   NEGATIVE   [facility]  
y   
Health  
Work   
Phone:   
[phone]  
   
                                                    Cocaine Metabolite,   
Urine           Negative                        NEGATIVE        [facility]  
Work   
Phone:   
[phone]  
   
                                                    Interpretation and   
review of laboratory   
results         Abnormal                                        [facility]   
Phone:   
[phone]  
   
                      Methadone Screen, Urine Negative              NEGATIVE   M  
[facility]   
[facility]   
Phone:   
[phone]  
   
                      Methamphetamine, Urine Negative              NEGATIVE   Me  
[facility]   
[employer]  
Work   
Phone:   
[phone]  
   
                      Opiates, Urine Negative              NEGATIVE   [facility]   
Phone:   
[phone]  
   
                      Oxycodone Screen, Ur Negative              NEGATIVE   [facility]  
y   
Health  
Work   
Phone:   
[phone]  
   
                      Phencyclidine, Urine Negative              NEGATIVE   [facility]  
y   
Health  
Work   
Phone:   
[phone]  
   
                      Propoxyphene, Urine Negative              NEGATIVE   [facility]   
Phone:   
[phone]  
   
                      Test Information NOT REPORTED                       [facility]   
Phone:   
[phone]  
   
                                                    Tricyclic   
Antidepressants, Urine Negative                        NEGATIVE        [facility]   
Phone:   
[phone]  
   
                                        Comment on above:   Drug screen results   
are to be used for medical purposes only.   
All positive results are  
unconfirmed. Testing for employment or legal uses should be   
sent to a reference laboratory  
for confirmation.  
  
   
   
                                                                  [facility]   
Phone:   
[phone]  
   
                                                    COVID-19 PCRon 2020   
   
                          SARS-CoV-2, PAMELA Not Detected Normal       Not   
Detected                                The   
[facility]  
   
                                        Comment on above:   Result Comment: This  
 test was developed and its performance   
characteristics determined  
by [employer] This test has not been FDA cleared or  
approved. This test has been authorized by FDA under an   
Emergency Use  
Authorization (EUA). This test is only authorized for the   
duration of  
time the declaration that circumstances exist justifying the  
authorization of the emergency use of in vitro diagnostic   
tests for  
detection of SARS-CoV-2 virus and/or diagnosis of COVID-19   
infection  
under section 564(b)(1) of the Act, 21 U.S.C. 360bbb-3(b)(1),   
unless  
the authorization is terminated or revoked sooner.  
When diagnostic testing is negative, the possibility of a   
false  
negative result should be considered in the context of a   
patient's  
recent exposures and the presence of clinical signs and   
symptoms  
consistent with COVID-19. An individual without symptoms of   
COVID-19  
and who is not shedding SARS-CoV-2 virus would expect to have   
a  
negative (not detected) result in this assay.   
   
                                                            Performed By: #### C  
VDPCR ####  
[facility]  
[address], [place] [address]  
Sosa Multani   
   
                                                    CBC/PLATELET & AUTO DIFFEREN  
[place] 2017   
   
                                                    Basophils Auto #/vol   
(Bld)           0.0 10*3/uL     Normal          0.0-0.1         [facility]  
   
                                                    Basophils/100 WBC Auto   
(Bld)           0 %             Normal          0-1             [facility]  
   
                                                    CBC/PLATELET & AUTO   
DIFFERENTIAL    0.0 10*3/uL     Normal          -               [facility]  
   
                                        Comment on above:   Result Comment: er 7  
   
   
                      Eosinophils 0.0 10*3/uL Normal     0.0-0.4    [facility]  
   
                                                    Eosinophils/100   
leukocytes      0 %             Normal          0-5             [facility]  
   
                                                    Erythrocyte   
distribution width Auto   
Ratio (RBC)     12.9 %          Normal          11.7-14.4       [facility]  
   
                      Erythrocytes (RBC) 4.79 10*6/uL Normal     4.20-5.40  [facility]  
   
                      Hematocrit (HCT) 40.0 %     Normal     37.0-47.0  [facility]  
   
                                                    Hemoglobin mass conc   
(Bld)           14.0 g/dL       Normal          11.5-16.0       [facility]  
   
                      Lymphocytes 1.8 10*3/uL Normal     0.9-2.5    [facility]  
   
                                                    Lymphocytes/100   
leukocytes      10 %            Low             13-44           [facility]  
   
                      MCH        29.2 pg    Normal     27.0-31.0  [facility]  
   
                      MCHC mass conc (RBC) 35.0 g/dL  Normal     31.5-36.0  [facility]  
   
                      MCV        83.5 fL    Normal     82.0-101.0 [facility]  
   
                      Monocytes  1.0 10*3/uL Normal     0.1-1.0    [facility]  
   
                                                    Monocytes/100   
leukocytes      6 %             Normal          5-9             [facility]  
   
                      Neutrophils 14.5 10*3/uL High       2.1-6.5    [facility]  
   
                                                    Neutrophils/100   
leukocytes      83 %            High            39-75           [facility]  
   
                      Nucleated erythrocytes 0 %        Normal     -          [facility]  
   
                                                    Platelet mean volume   
(PMV)           8.5 fL          Normal          8.2-11.4        [facility]  
   
                      Platelets  239.0 10*3/uL Normal     130.0-400.0 [facility]  
   
                      WBC (Leukocytes) 17.6 10*3/uL High       4.4-10.2   [facility]  
   
                                                    CHEST PORTABLEon 2017   
   
                                        CHEST PORTABLE      EXAM: Portable   
chest.CLINICAL STATEMENT:   
Syncopal episode   
today.COMPARISON:   
2017.TECHNIQUE:   
Single mobile AP upright   
view chest at 3:21   
PM.FINDINGS: Heart size   
within normal limits.   
There is no mediastinal   
widening.The lung fields   
and pleural spaces are   
clear.IMPRESSION:No acute   
change.Dictated   
Physician: Je Bell.Dictated On: 2017   
15:30Interpreted By:   
Je BellTranscribed By: Gunnar Belligned By: Je BellSigned On:   
2017 15:30    Normal                                  [facility]  
   
                                                    HCG URINEon 2017   
   
                                                    HCG.beta subunit   
(pregnancy test) Ql (U) Negative        Normal          Negative        [facility]  
   
                                                    HCG.beta subunit   
(pregnancy test) Ql (U) SEE NOTE        Normal          -               [facility]  
   
                                        Comment on above:   Result Comment: er 7  
   
   
                                                    M I PANELon 2017   
   
                      Anion gap  12.8 mmol/L Normal     [facility]  
   
                      BUN/Creatinine Ratio 10.2 mg/mg Normal     [facility]  
   
                      Calcium    8.8 mg/dL  Normal     8.5-10.1   [facility]  
   
                      Chloride   107 mmol/L Normal          [facility]  
   
                      CO2        27.1 mmol/L Normal     21.0-32.0  [facility]  
   
                      Creatinine 0.98 mg/dL Normal     0.55-1.02  [facility]  
   
                      eGFR (non-black) mL/min/{1.73_m2} Normal     >60        [facility]  
   
                      Glucose mass conc 87 mg/dL   Normal          [facility]  
   
                      M I PANEL  SEE NOTE   Normal     [facility]  
   
                                        Comment on above:   Result Comment: er 7  
   
   
                      Magnesium  1.9 mg/dL  Normal     1.8-2.4    [facility]  
   
                      Osmolality 283 mosm/kg [facility]  
   
                      Potassium molar conc 3.9 mmol/L Normal     3.5-5.1    [facility]  
   
                      Sodium     143 mmol/L Normal     136-145    [facility]  
   
                                                    Troponin I.cardiac mass   
conc            ng/mL           Normal          0.000-0.056     [facility]  
   
                      Urea nitrogen 10 mg/dL   Normal     7-18       [facility]  
   
                                                    PT PROTIMEon 2017   
   
                      INR Coag RelTime (PPP) 1.0 {INR}  Normal     -          [facility]  
   
                                                    Prothrombin time (PT)   
Coag time (PPP) 10.5 s          Normal          9.3-11.7        [facility]  
   
                      PT PROTIME SEE NOTE   Normal     -          [facility]  
   
                                        Comment on above:   Result Comment: er 7  
   
   
                                                    PTTon 2017   
   
                      aPTT       24.8 s     Normal     24.5-32.7  [facility]  
   
                                        Comment on above:   Result Comment: er 7  
   
   
                                                    URINALYSIS W/ MICROSCOPIC if  
 indicatedon 2017   
   
                      Bilirubin Ql (U) Negative   Normal     Negative   [facility]  
   
                      Blood      Moderate   Abnormal   Negative   [facility]  
   
                      Blood product type Clean catch Normal     -          [facility]  
   
                      Glucose mass conc Normal     Normal     Normal     [facility]  
   
                      Protein    Moderate   Abnormal   Negative   [facility]  
   
                      Sq. Epithelial Cells 3 /[HPF]   Abnormal   None seen  [facility]  
   
                                                    URINALYSIS W/   
MICROSCOPIC if   
indicated       0 /[HPF]        Normal          0-2             [facility]  
   
                                        Comment on above:   Result Comment: er 7  
   
   
                      Urine, appearance CLEAR      Normal     Clear      [facility]  
   
                                                    Urine, bacteria in   
sediment        1 /[HPF]        Abnormal        None seen       [facility]  
   
                      Urine, color YELLOW     Normal     -          [facility]  
   
                      Urine, ketones presence Negative   Normal     Negative   H  
[facility]  
   
                      Urine, nitrite presence Positive   Abnormal   Negative   H  
[facility]  
   
                      Urine, pH  7.0 [pH]   Normal     5.0 - 9.0  [facility]  
   
                          Urine, specific gravity 1.010        Normal       1.01  
0 -   
1.030                                   [facility]  
   
                      Urine, urobilinogen Normal     Normal     Normal     [facility]  
   
                      WBC (Leukocytes) 6 /[HPF]   Abnormal   0-5        [facility]  
   
                      WBC (Leukocytes) Moderate   Abnormal   Negative   [facility]  
  
  
  
Vital Signs  
  
  
                          Date Time    Vital Sign   Value        Performing   
Clinician                               Facility  
   
                                                    11-   
16:                              Diastolic blood   
pressure                  83 mm[Hg]                 Doreen Cabrera CNP  
Work Phone:   
[phone]                          [facility]  
Work Phone:   
[phone]  
   
                                                    11-   
16:                              Systolic blood   
pressure                  149 mm[Hg]                Doreen Cabrera CNP  
Work Phone:   
[phone]                          [facility]  
Work Phone:   
[phone]  
   
                                                    11-   
16:          Body height         162.56 cm           Doreen Cabrera CNP  
Work Phone:   
[phone]                          [facility]  
Work Phone:   
[phone]  
   
                                                    11-   
16:                              Body mass index   
(BMI) [Ratio]             56.5 kg/m2                Doreen Cabrera CNP  
Work Phone:   
[phone]                          [facility]  
Work Phone:   
[phone]  
   
                                                    11-   
16:                              Body surface area   
Derived from formula      2.4 m2                    Doreen Cabrera CNP  
Work Phone:   
[phone]                          [facility]  
Work Phone:   
[phone]  
   
                                                    11-   
16:          Body temperature    98.3 [degF]         Doreen Cabrera CNP  
Work Phone:   
[phone]                          [facility]  
Work Phone:   
[phone]  
   
                                                    11-   
16:          Body weight         149.42 kg           Doreen Cabrera CNP  
Work Phone:   
[phone]                          [facility]  
Work Phone:   
[phone]  
   
                                                    11-   
16:                              Diastolic blood   
pressure                  84 mm[Hg]                 Doreen Cabrera CNP  
Work Phone:   
[phone]                          [facility]  
Work Phone:   
[phone]  
   
                                                    11-   
16:          Heart rate          100 /min            Doreen Cabrera CNP  
Work Phone:   
[phone]                          [facility]  
Work Phone:   
[phone]  
   
                                                    11-   
16:          Respiratory rate    18 /min             Doreen Cabrera CNP  
Work Phone:   
[phone]                          [facility]  
Work Phone:   
[phone]  
   
                                                    11-   
16:                              SaO2% (BldA) [Mass   
fraction]                 97 %                      Doreen Cabrera CNP  
Work Phone:   
[phone]                          [facility]  
Work Phone:   
[phone]  
   
                                                    11-   
16:                              Systolic blood   
pressure                  120 mm[Hg]                Doreen Cabrera CNP  
Work Phone:   
[phone]                          [facility]  
Work Phone:   
[phone]  
   
                                                    2024   
16:          Body height         162.56 cm           Doreen Cabrera CNP  
Work Phone:   
[phone]                          [facility]  
Work Phone:   
[phone]  
   
                                                    2024   
16:                              Body mass index   
(BMI) [Ratio]             57.2 kg/m2                Doreen Cabrera CNP  
Work Phone:   
[phone]                          [facility]  
Work Phone:   
[phone]  
   
                                                    2024   
16:                              Body surface area   
Derived from formula      2.4 m2                    Doreen Cabrera CNP  
Work Phone:   
[phone]                          [facility]  
Work Phone:   
[phone]  
   
                                                    2024   
16:          Body temperature    97.6 [degF]         Doreen Cabrera CNP  
Work Phone:   
[phone]                          [facility]  
Work Phone:   
[phone]  
   
                                                    2024   
16:          Body weight         151.05 kg           Doreen Cabrera CNP  
Work Phone:   
[phone]                          [facility]  
Work Phone:   
[phone]  
   
                                                    2024   
16:                              Diastolic blood   
pressure                  86 mm[Hg]                 Doreen Cabrera CNP  
Work Phone:   
[phone]                          [facility]  
Work Phone:   
[phone]  
   
                                                    2024   
16:          Heart rate          103 /min            Doreen Cabrera CNP  
Work Phone:   
[phone]                          [facility]  
Work Phone:   
[phone]  
   
                                                    2024   
16:          Respiratory rate    18 /min             Doreen Cabrera CNP  
Work Phone:   
[phone]                          [facility]  
Work Phone:   
[phone]  
   
                                                    2024   
16:                              SaO2% (BldA) [Mass   
fraction]                 95 %                      Doreen Cabrera CNP  
Work Phone:   
[phone]                          [facility]  
Work Phone:   
[phone]  
   
                                                    2024   
16:                              Systolic blood   
pressure                  135 mm[Hg]                Doreen Cabrera CNP  
Work Phone:   
[phone]                          [facility]  
Work Phone:   
[phone]  
   
                                                    2024   
17:                              Diastolic blood   
pressure                  84 mm[Hg]                 Doreen Cabrera CNP  
Work Phone:   
[phone]                          [facility]  
   
                                        Comment on above:   manual large   
   
                                                    2024   
17:                              Systolic blood   
pressure                  144 mm[Hg]                Doreen Cabrera CNP  
Work Phone:   
[phone]                          [facility]  
   
                                        Comment on above:   manual large   
   
                                                    2024   
17:                              Diastolic blood   
pressure                  86 mm[Hg]                 Doreen Cabrera CNP  
Work Phone:   
[phone]                          [facility]  
Work Phone:   
[phone]  
   
                                                    2024   
17:                              Systolic blood   
pressure                  154 mm[Hg]                Doreen Cabrera CNP  
Work Phone:   
[phone]                          [facility]  
Work Phone:   
[phone]  
   
                                                    2024   
16:          Body height         162.56 cm           Doreen Cabrera CNP  
Work Phone:   
[phone]                          [facility]  
Work Phone:   
[phone]  
   
                                                    2024   
16:                              Body mass index   
(BMI) [Ratio]             57.9 kg/m2                Doreen Cabrera CNP  
Work Phone:   
[phone]                          [facility]  
Work Phone:   
[phone]  
   
                                                    2024   
16:                              Body surface area   
Derived from formula      2.4 m2                    Doreen Cabrera CNP  
Work Phone:   
[phone]                          [facility]  
Work Phone:   
[phone]  
   
                                                    2024   
16:          Body weight         153.14 kg           Doreen Cabrera CNP  
Work Phone:   
[phone]                          [facility]  
Work Phone:   
[phone]  
   
                                                    2024   
16:                              Diastolic blood   
pressure                  98 mm[Hg]                 Doreen Cabrera CNP  
Work Phone:   
[phone]                          [facility]  
Work Phone:   
[phone]  
   
                                                    2024   
16:          Heart rate          103 /min            Doreen Cabrera CNP  
Work Phone:   
[phone]                          [facility]  
Work Phone:   
[phone]  
   
                                                    2024   
16:                              SaO2% (BldA) [Mass   
fraction]                 96 %                      Doreen Cabrera CNP  
Work Phone:   
[phone]                          [facility]  
Work Phone:   
[phone]  
   
                                                    2024   
16:                              Systolic blood   
pressure                  151 mm[Hg]                Doreen Cabrera CNP  
Work Phone:   
[phone]                          [facility]  
Work Phone:   
[phone]  
   
                                                    2024   
16:                              Diastolic blood   
pressure                  89 mm[Hg]                 Doreen Cabrera CNP  
Work Phone:   
[phone]                          [facility]  
   
                                                    2024   
16:          Heart rate          75 /min             Doreen Cabrera CNP  
Work Phone:   
[phone]                          [facility]  
   
                                                    2024   
16:                              Systolic blood   
pressure                  137 mm[Hg]                Doreen Cabrera CNP  
Work Phone:   
[phone]                          [facility]  
   
                                                    2024   
16:          Body height         162.56 cm           Doreen Cabrera CNP  
Work Phone:   
[phone]                          [facility]  
   
                                                    2024   
16:                              Body mass index   
(BMI) [Ratio]             54.6 kg/m2                Doreen Cabrera CNP  
Work Phone:   
[phone]                          [facility]  
   
                                                    2024   
16:                              Body surface area   
Derived from formula      2.4 m2                    Doreen Cabrera CNP  
Work Phone:   
[phone]                          [facility]  
   
                                                    2024   
16:          Body weight         144.24 kg           Doreen Cabrera CNP  
Work Phone:   
[phone]                          [facility]  
   
                                                    2024   
16:                              Diastolic blood   
pressure                  94 mm[Hg]                 Doreen Cabrera CNP  
Work Phone:   
[phone]                          [facility]  
   
                                                    2024   
16:          Heart rate          75 /min             Doreen Cabrera CNP  
Work Phone:   
[phone]                          [facility]  
   
                                                    2024   
16:                              SaO2% (BldA) [Mass   
fraction]                 98 %                      Doreen Cabrera CNP  
Work Phone:   
[phone]                          [facility]  
   
                                                    2024   
16:                              Systolic blood   
pressure                  161 mm[Hg]                Doreen Cabrera CNP  
Work Phone:   
[phone]                          [facility]  
   
                                                    2024   
19:          Body height         162.56 cm           Doreen Cabrera CNP  
Work Phone:   
[phone]                          [facility]  
   
                                                    2024   
19:                              Body mass index   
(BMI) [Ratio]             52.7 kg/m2                Doreen Cabrera CNP  
Work Phone:   
[phone]                          [facility]  
   
                                                    2024   
19:                              Body surface area   
Derived from formula      2.3 m2                    Doreen Cabrera CNP  
Work Phone:   
[phone]                          [facility]  
   
                                                    2024   
19:          Body weight         139.26 kg           Doreen Cabrera CNP  
Work Phone:   
[phone]                          [facility]  
   
                                                    2024   
19:                              Diastolic blood   
pressure                  88 mm[Hg]                 Doreen Cabrera CNP  
Work Phone:   
[phone]                          [facility]  
   
                                                    2024   
19:          Heart rate          106 /min            Doreen Cabrera CNP  
Work Phone:   
[phone]                          [facility]  
   
                                                    2024   
19:                              SaO2% (BldA) [Mass   
fraction]                 97 %                      Doreen Cabrera CNP  
Work Phone:   
[phone]                          [facility]  
   
                                                    2024   
19:                              Systolic blood   
pressure                  134 mm[Hg]                Doreen Cabrera CNP  
Work Phone:   
[phone]                          [facility]  
   
                                                    2023   
15:                              Diastolic blood   
pressure                  69 mm[Hg]                 Doreen Cabrera CNP  
Work Phone:   
[phone]                          [facility]  
   
                                                    2023   
15:                              Systolic blood   
pressure                  116 mm[Hg]                Doreen Cabrera CNP  
Work Phone:   
[phone]                          [facility]  
   
                                                    2023   
15:                              Diastolic blood   
pressure                  78 mm[Hg]                 Doreen Cabrera CNP  
Work Phone:   
[phone]                          [facility]  
Work Phone:   
[phone]  
   
                                                    2023   
15:                              Systolic blood   
pressure                  137 mm[Hg]                Doreen Cabrera CNP  
Work Phone:   
[phone]                          [facility]  
Work Phone:   
[phone]  
   
                                                    2023   
17:          Body height         162.56 cm           Doreen Cabrera CNP  
Work Phone:   
[phone]                          [facility]  
Work Phone:   
[phone]  
   
                                                    2023   
17:                              Body mass index   
(BMI) [Ratio]             32.8 kg/m2                Doreen Cabrera CNP  
Work Phone:   
[phone]                          [facility]  
Work Phone:   
[phone]  
   
                                                    2023   
17:                              Body surface area   
Derived from formula      1.9 m2                    Doreen Cabrera CNP  
Work Phone:   
[phone]                          [facility]  
Work Phone:   
[phone]  
   
                                                    2023   
17:          Body temperature    98.2 [degF]         Doreen Cabrera CNP  
Work Phone:   
[phone]                          [facility]  
Work Phone:   
[phone]  
   
                                                    2023   
17:          Body weight         86.64 kg            Doreen Cabrera CNP  
Work Phone:   
[phone]                          [facility]  
Work Phone:   
[phone]  
   
                                                    2023   
17:                              Diastolic blood   
pressure                  76 mm[Hg]                 Doreen Cabrera CNP  
Work Phone:   
[phone]                          [facility]  
Work Phone:   
[phone]  
   
                                                    2023   
17:          Heart rate          97 /min             Doreen Cabrera CNP  
Work Phone:   
[phone]                          [facility]  
Work Phone:   
[phone]  
   
                                                    2023   
17:                              SaO2% (BldA) [Mass   
fraction]                 97 %                      Doreen Cabrera CNP  
Work Phone:   
[phone]                          [facility]  
Work Phone:   
[phone]  
   
                                                    2023   
17:                              Systolic blood   
pressure                  111 mm[Hg]                Doreen Cabrera CNP  
Work Phone:   
[phone]                          [facility]  
Work Phone:   
[phone]  
   
                                                    07-   
14:                              Diastolic blood   
pressure                  76 mm[Hg]                 Doreen Cabrera CNP  
Work Phone:   
[phone]                          [facility]  
   
                                                    07-   
14:          Heart rate          94 /min             Doreen Cabrera CNP  
Work Phone:   
[phone]                          [facility]  
   
                                                    07-   
14:                              Systolic blood   
pressure                  124 mm[Hg]                Doreen Cabrera CNP  
Work Phone:   
[phone]                          [facility]  
   
                                                    10-   
13:          Body height         162.56 cm           Doreen Cabrera CNP  
Work Phone:   
[phone]                          [facility]  
Work Phone:   
[phone]  
   
                                                    10-   
13:                              Body mass index   
(BMI) [Ratio]             52.5 kg/m2                Doreen Cabrera CNP  
Work Phone:   
[phone]                          [facility]  
Work Phone:   
[phone]  
   
                                                    10-   
13:                              Body surface area   
Derived from formula      2.3 m2                    Doreen Cabrera CNP  
Work Phone:   
[phone]                          [facility]  
Work Phone:   
[phone]  
   
                                                    10-   
13:          Body temperature    99.3 [degF]         Doreen Cabrera CNP  
Work Phone:   
[phone]                          [facility]  
Work Phone:   
[phone]  
   
                                                    10-   
13:          Body weight         138.8 kg            Doreen Cabrera CNP  
Work Phone:   
[phone]                          [facility]  
Work Phone:   
[phone]  
   
                                                    10-   
13:                              Diastolic blood   
pressure                  86 mm[Hg]                 Doreen Cabrera CNP  
Work Phone:   
[phone]                          [facility]  
Work Phone:   
[phone]  
   
                                                    10-   
13:          Heart rate          96 /min             Doreen Cabrera CNP  
Work Phone:   
[phone]                          [facility]  
Work Phone:   
[phone]  
   
                                                    10-   
13:          Heart Rate Rhythm   1 1                 Doreen Cabrera CNP  
Work Phone:   
[phone]                          [facility]  
Work Phone:   
[phone]  
   
                                                    10-   
13:                              SaO2% (BldA) [Mass   
fraction]                 97 %                      Doreen Cabrera CNP  
Work Phone:   
[phone]                          [facility]  
Work Phone:   
[phone]  
   
                                                    10-   
13:                              Systolic blood   
pressure                  124 mm[Hg]                Doreen Cabrera CNP  
Work Phone:   
[phone]                          [facility]  
Work Phone:   
[phone]  
   
                                                    2022   
15:          Body height         162.56 cm           Doreen Cabrera CNP  
Work Phone:   
[phone]                          [facility]  
Work Phone:   
[phone]  
   
                                                    2022   
15:                              Body mass index   
(BMI) [Ratio]             53.6 kg/m2                Doreen Cabrera CNP  
Work Phone:   
[phone]                          [facility]  
Work Phone:   
[phone]  
   
                                                    2022   
15:                              Body surface area   
Derived from formula      2.36 m2                   Doreen Cabrera CNP  
Work Phone:   
[phone]                          [facility]  
Work Phone:   
[phone]  
   
                                                    2022   
15:                              Body surface area   
Derived from formula      2.4 m2                    Doreen Cabrera CNP  
Work Phone:   
[phone]                          [facility]  
Work Phone:   
[phone]  
   
                                                    2022   
15:          Body temperature    97.4 [degF]         Doreen Cabrera CNP  
Work Phone:   
[phone]                          [facility]  
Work Phone:   
[phone]  
   
                                                    2022   
15:          Body weight         141.52 kg           Doreen Cabrera CNP  
Work Phone:   
[phone]                          [facility]  
Work Phone:   
[phone]  
   
                                                    2022   
15:                              Diastolic blood   
pressure                  82 mm[Hg]                 Doreen Cabrera CNP  
Work Phone:   
[phone]                          [facility]  
Work Phone:   
[phone]  
   
                                                    2022   
15:          Heart rate          86 /min             Doreen Cabrera CNP  
Work Phone:   
[phone]                          [facility]  
Work Phone:   
[phone]  
   
                                                    2022   
15:                              SaO2% (BldA) [Mass   
fraction]                 98 %                      Doreen Cabrera CNP  
Work Phone:   
[phone]                          [facility]  
Work Phone:   
[phone]  
   
                                                    2022   
15:                              Systolic blood   
pressure                  124 mm[Hg]                Doreen Cabrera CNP  
Work Phone:   
[phone]                          [facility]  
Work Phone:   
[phone]  
   
                                                    2022   
15:          Body height         162.56 cm           Doreen Cabrera CNP  
Work Phone:   
[phone]                          [facility]  
Work Phone:   
[phone]  
   
                                                    2022   
15:                              Body mass index   
(BMI) [Ratio]             52.2 kg/m2                Doreen Cabrera CNP  
Work Phone:   
[phone]                          [facility]  
Work Phone:   
[phone]  
   
                                                    2022   
15:                              Body surface area   
Derived from formula      2.34 m2                   Doreen Cabrera CNP  
Work Phone:   
[phone]                          [facility]  
Work Phone:   
[phone]  
   
                                                    2022   
15:                              Body surface area   
Derived from formula      2.3 m2                    Doreen Cabrera CNP  
Work Phone:   
[phone]                          [facility]  
Work Phone:   
[phone]  
   
                                                    2022   
15:          Body temperature    99.3 [degF]         Doreen Cabrera CNP  
Work Phone:   
[phone]                          [facility]  
Work Phone:   
[phone]  
   
                                                    2022   
15:          Body weight         137.89 kg           Doreen Cabrera CNP  
Work Phone:   
[phone]                          [facility]  
Work Phone:   
[phone]  
   
                                                    2022   
15:                              Diastolic blood   
pressure                  94 mm[Hg]                 Doreen Cabrera CNP  
Work Phone:   
[phone]                          [facility]  
Work Phone:   
[phone]  
   
                                                    2022   
15:          Heart rate          105 /min            Doreen Cabrera CNP  
Work Phone:   
[phone]                          [facility]  
Work Phone:   
[phone]  
   
                                                    2022   
15:          Heart Rate Rhythm   1 1                 Doreen Cabrera CNP  
Work Phone:   
[phone]                          [facility]  
Work Phone:   
[phone]  
   
                                                    2022   
15:                              SaO2% (BldA) [Mass   
fraction]                 98 %                      Doreen Cabrera CNP  
Work Phone:   
[phone]                          [facility]  
Work Phone:   
[phone]  
   
                                                    2022   
15:                              Systolic blood   
pressure                  126 mm[Hg]                Doreen Cabrera CNP  
Work Phone:   
[phone]                          [facility]  
Work Phone:   
[phone]  
   
                                                    2022   
16:                              Diastolic blood   
pressure                  102 mm[Hg]                Doreen Cabrera CNP  
Work Phone:   
[phone]                          [facility]  
Work Phone:   
[phone]  
   
                                                    2022   
16:                              Systolic blood   
pressure                  150 mm[Hg]                Doreen Cabrera CNP  
Work Phone:   
[phone]                          [facility]  
Work Phone:   
[phone]  
   
                                                    2022   
15:          Body height         162.56 cm           Doreen Cabrera CNP  
Work Phone:   
[phone]                          [facility]  
Work Phone:   
[phone]  
   
                                                    2022   
15:                              Body mass index   
(BMI) [Ratio]             52.6 kg/m2                Doreen Cabrera CNP  
Work Phone:   
[phone]                          [facility]  
Work Phone:   
[phone]  
   
                                                    2022   
15:                              Body surface area   
Derived from formula      2.34 m2                   Doreen Cabrera CNP  
Work Phone:   
[phone]                          [facility]  
Work Phone:   
[phone]  
   
                                                    2022   
15:                              Body surface area   
Derived from formula      2.3 m2                    Doreen Cabrera CNP  
Work Phone:   
[phone]                          [facility]  
Work Phone:   
[phone]  
   
                                                    2022   
15:          Body temperature    98.5 [degF]         Doreen Cabrera CNP  
Work Phone:   
[phone]                          [facility]  
Work Phone:   
[phone]  
   
                                                    2022   
15:          Body weight         138.98 kg           Doreen Cabrera CNP  
Work Phone:   
[phone]                          [facility]  
Work Phone:   
[phone]  
   
                                                    2022   
15:                              Diastolic blood   
pressure                  108 mm[Hg]                Doreen Cabrera CNP  
Work Phone:   
[phone]                          [facility]  
Work Phone:   
[phone]  
   
                                                    2022   
15:          Heart rate          89 /min             Doreen Cabrera CNP  
Work Phone:   
[phone]                          [facility]  
Work Phone:   
[phone]  
   
                                                    2022   
15:                              SaO2% (BldA) [Mass   
fraction]                 99 %                      Doreen Cabrera CNP  
Work Phone:   
[phone]                          [facility]  
Work Phone:   
[phone]  
   
                                                    2022   
15:                              Systolic blood   
pressure                  152 mm[Hg]                Doreen Cabrera CNP  
Work Phone:   
[phone]                          [facility]  
Work Phone:   
[phone]  
   
                                                    2021   
13:          Body height         162.56 cm           Doreen Cabrera CNP  
Work Phone:   
[phone]                          [facility]  
Work Phone:   
[phone]  
   
                                                    2021   
13:                              Body mass index   
(BMI) [Ratio]             50.1 kg/m2                Doreen Cabrera CNP  
Work Phone:   
[phone]                          [facility]  
Work Phone:   
[phone]  
   
                                                    2021   
13:                              Body surface area   
Derived from formula      2.3 m2                    Doreen Cabrera CNP  
Work Phone:   
[phone]                          [facility]  
Work Phone:   
[phone]  
   
                                                    2021   
13:          Body temperature    96.8 [degF]         Doreen Cabrera CNP  
Work Phone:   
[phone]                          [facility]  
Work Phone:   
[phone]  
   
                                                    2021   
13:          Body weight         132.45 kg           Doreen Cabrera CNP  
Work Phone:   
[phone]                          [facility]  
Work Phone:   
[phone]  
   
                                                    2021   
13:                              Diastolic blood   
pressure                  86 mm[Hg]                 Doreen Cabrera CNP  
Work Phone:   
[phone]                          [facility]  
Work Phone:   
[phone]  
   
                                                    2021   
13:          Heart rate          86 /min             Doreen Cabrera CNP  
Work Phone:   
[phone]                          [facility]  
Work Phone:   
[phone]  
   
                                                    2021   
13:          Respiratory rate    18 /min             Doreen Cabrera CNP  
Work Phone:   
[phone]                          [facility]  
Work Phone:   
[phone]  
   
                                                    2021   
13:                              SaO2% (BldA) [Mass   
fraction]                 96 %                      Doreen Cabrera CNP  
Work Phone:   
[phone]                          [facility]  
Work Phone:   
[phone]  
   
                                                    2021   
13:                              Systolic blood   
pressure                  132 mm[Hg]                Doreen Cabrera CNP  
Work Phone:   
[phone]                          [facility]  
Work Phone:   
[phone]  
   
                                                    2021   
23:          Heart rate          100 /min            Seth Rahman MD  
Work Phone:   
[phone]                          "[employer]  
Work Phone:   
[phone]  
   
                                                    2021   
23:                              Diastolic blood   
pressure                  94 mm[Hg]                 Seth Rahman MD  
Work Phone:   
[phone]                          "[employer]  
Work Phone:   
[phone]  
   
                                                    2021   
23:                              Systolic blood   
pressure                  146 mm[Hg]                Seth Rahman MD  
Work Phone:   
[phone]                          "[employer]  
Work Phone:   
[phone]  
   
                                                    2021   
22:                              SaO2% (BldA) [Mass   
fraction]                 97 %                      Seth Rahman MD  
Work Phone:   
[phone]                          "[employer]  
Work Phone:   
[phone]  
   
                                                    2021   
14:                              Body mass index   
(BMI) [Ratio]             47.72 kg/m2               Seth Rahman MD  
Work Phone:   
[phone]                          "[employer]  
Work Phone:   
[phone]  
   
                                                    2021   
14:          Body temperature    98.2 [degF]         Seth Rahman MD  
Work Phone:   
[phone]                          "[employer]  
Work Phone:   
[phone]  
   
                                                    2021   
14:          Body weight         126.1 kg            Seth Rahman MD  
Work Phone:   
[phone]                          "[employer]  
Work Phone:   
[phone]  
   
                                                    2021   
14:          Respiratory rate    16 /min             Seth Rahman MD  
Work Phone:   
[phone]                          "[employer]  
Work Phone:   
[phone]  
   
                                                    2021   
16:          Body height         162.56 cm           Doreen Cabrera CNP  
Work Phone:   
[phone]                          [facility]  
Work Phone:   
[phone]  
   
                                                    2021   
16:                              Body mass index   
(BMI) [Ratio]             49.3 kg/m2                Doreen Cabrera LARISSA  
Work Phone:   
[phone]                          [facility]  
Work Phone:   
[phone]  
   
                                                    2021   
16:                              Body surface area   
Derived from formula      2.28 m2                   Doreen Deborah DIAS  
Work Phone:   
[phone]                          [facility]  
Work Phone:   
[phone]  
   
                                                    2021   
16:          Body temperature    97.4 [degF]         Doreen Cabrera CNP  
Work Phone:   
[phone]                          [facility]  
Work Phone:   
[phone]  
   
                                                    2021   
16:          Body weight         130.18 kg           Doreen Cabrera CNP  
Work Phone:   
[phone]                          [facility]  
Work Phone:   
[phone]  
   
                                                    2021   
16:                              Diastolic blood   
pressure                  98 mm[Hg]                 Doreen Cabrera CNP  
Work Phone:   
[phone]                          [facility]  
Work Phone:   
[phone]  
   
                                                    2021   
16:          Heart rate          85 /min             Doreen Cabrera CNP  
Work Phone:   
[phone]                          [facility]  
Work Phone:   
[phone]  
   
                                                    2021   
16:          Respiratory rate    18 /min             Doreen Cabrera CNP  
Work Phone:   
[phone]                          [facility]  
Work Phone:   
[phone]  
   
                                                    2021   
16:                              SaO2% (BldA) [Mass   
fraction]                 97 %                      Doreen Cabrera CNP  
Work Phone:   
[phone]                          [facility]  
Work Phone:   
[phone]  
   
                                                    2021   
16:                              Systolic blood   
pressure                  144 mm[Hg]                Doreen Cabrera CNP  
Work Phone:   
[phone]                          [facility]  
Work Phone:   
[phone]  
   
                                                    2021   
02:                              Diastolic blood   
pressure                  93 mm[Hg]                 Malika Shepherd MD  
Work Phone:   
[phone]                          "[employer]  
Work Phone:   
[phone]  
   
                                                    2021   
02:          Heart rate          75 /min             Malika Shepherd MD  
Work Phone:   
[phone]                          "[employer]  
Work Phone:   
[phone]  
   
                                                    2021   
02:          Respiratory rate    20 /min             Malika Shepherd MD  
Work Phone:   
[phone]                          "[employer]  
Work Phone:   
[phone]  
   
                                                    2021   
02:                              SaO2% (BldA) [Mass   
fraction]                 100 %                     Malika Shepherd MD  
Work Phone:   
[phone]                          "[employer]  
Work Phone:   
[phone]  
   
                                                       
02:                              Systolic blood   
pressure                  150 mm[Hg]                Malika Shepherd MD  
Work Phone:   
[phone]                          "[employer]  
Work Phone:   
[phone]  
   
                                                    2021   
20:          Body temperature    99.1 [degF]         Malika Shepherd MD  
Work Phone:   
[phone]                          "[employer]  
Work Phone:   
[phone]  
   
                                                    2021   
11:          Body height         162.56 cm           Doreen Cabrera CNP  
Work Phone:   
[phone]                          [facility]  
Work Phone:   
[phone]  
   
                                                    2021   
11:                              Body mass index   
(BMI) [Ratio]             48.9 kg/m2                Doreenpaola Cabrera LARISSA  
Work Phone:   
[phone]                          [facility]  
Work Phone:   
[phone]  
   
                                                    2021   
11:                              Body surface area   
Derived from formula      2.27 m2                   Doreen Deborah LARISSA  
Work Phone:   
[phone]                          [facility]  
Work Phone:   
[phone]  
   
                                                    2021   
11:          Body temperature    98.2 [degF]         Doreen Deborah DIAS  
Work Phone:   
[phone]                          [facility]  
Work Phone:   
[phone]  
   
                                                    2021   
11:          Body weight         129.28 kg           Doreen Cabrera CNP  
Work Phone:   
[phone]                          [facility]  
Work Phone:   
[phone]  
   
                                                    2021   
11:                              Diastolic blood   
pressure                  86 mm[Hg]                 Doreen Cabrera CNP  
Work Phone:   
[phone]                          [facility]  
Work Phone:   
[phone]  
   
                                                    2021   
11:          Heart rate          101 /min            Doreen Cabrera CNP  
Work Phone:   
[phone]                          [facility]  
Work Phone:   
[phone]  
   
                                                    2021   
11:                              SaO2% (BldA) [Mass   
fraction]                 98 %                      Doreen Cabrera CNP  
Work Phone:   
[phone]                          [facility]  
Work Phone:   
[phone]  
   
                                                    2021   
11:                              Systolic blood   
pressure                  124 mm[Hg]                Doreen Cabrera CNP  
Work Phone:   
[phone]                          [facility]  
Work Phone:   
[phone]  
   
                                                    2021   
16:          Body height         162.6 cm            Rafael Andes DO  
Work Phone:   
[phone]                          "[employer]  
Work Phone:   
[phone]  
   
                                                    2021   
16:                              Body mass index   
(BMI) [Ratio]             47.2 kg/m2                Rafael Andes DO  
Work Phone:   
[phone]                          "[employer]  
Work Phone:   
[phone]  
   
                                                    2021   
16:          Body temperature    98.8 [degF]         Rafael Andes DO  
Work Phone:   
[phone]                          "[employer]  
Work Phone:   
[phone]  
   
                                                    2021   
16:          Body weight         124.74 kg           Rafael Andes DO  
Work Phone:   
[phone]                          "[employer]  
Work Phone:   
[phone]  
   
                                                    2021   
16:                              Diastolic blood   
pressure                  88 mm[Hg]                 Rafael Andes DO  
Work Phone:   
[phone]                          "[employer]  
Work Phone:   
[phone]  
   
                                                    2021   
16:          Heart rate          78 /min             Rafael Andes DO  
Work Phone:   
[phone]                          "[employer]  
Work Phone:   
[phone]  
   
                                                    2021   
16:          Respiratory rate    18 /min             Rafael Andes DO  
Work Phone:   
[phone]                          "[employer]  
Work Phone:   
[phone]  
   
                                                    2021   
16:                              SaO2% (BldA) [Mass   
fraction]                 96 %                      Rafael Andes DO  
Work Phone:   
[phone]                          "[employer]  
Work Phone:   
[phone]  
   
                                                    2021   
16:                              Systolic blood   
pressure                  138 mm[Hg]                Rafael Andes DO  
Work Phone:   
[phone]                          "[employer]  
Work Phone:   
[phone]  
   
                                                    06-   
15:          Body height         162.56 cm           Doreen Cabrera CNP  
Work Phone:   
[phone]                          [employer]   
[facility]  
Work Phone:   
[phone]  
   
                                                    06-   
15:                              Body mass index   
(BMI) [Ratio]             49.5 kg/m2                Doreen Cabrera CNP  
Work Phone:   
[phone]                          [facility]  
Work Phone:   
[phone]  
   
                                                    06-   
15:                              Body surface area   
Derived from formula      2.29 m2                   Doreen Cabrera CNP  
Work Phone:   
[phone]                          [facility]  
Work Phone:   
[phone]  
   
                                                    06-   
15:          Body temperature    97.6 [degF]         Doreen Cabrera CNP  
Work Phone:   
[phone]                          [facility]  
Work Phone:   
[phone]  
   
                                                    06-   
15:          Body weight         130.82 kg           Doreen Cabrera CNP  
Work Phone:   
[phone]                          [facility]  
Work Phone:   
[phone]  
   
                                                    06-   
15:                              Diastolic blood   
pressure                  88 mm[Hg]                 Doreen Cabrera CNP  
Work Phone:   
[phone]                          [facility]  
Work Phone:   
[phone]  
   
                                                    06-   
15:          Heart rate          83 /min             Doreen Cabrera CNP  
Work Phone:   
[phone]                          [facility]  
Work Phone:   
[phone]  
   
                                                    06-   
15:          Respiratory rate    20 /min             Doreen Cabrera CNP  
Work Phone:   
[phone]                          [facility]  
Work Phone:   
[phone]  
   
                                                    06-   
15:                              SaO2% (BldA) [Mass   
fraction]                 98 %                      Doreen Cabrera CNP  
Work Phone:   
[phone]                          [facility]  
Work Phone:   
[phone]  
   
                                                    06-   
15:                              Systolic blood   
pressure                  130 mm[Hg]                Doreen Cabrera CNP  
Work Phone:   
[phone]                          [facility]  
Work Phone:   
[phone]  
   
                                                    2021   
15:                              Diastolic blood   
pressure                  108 mm[Hg]                Doreen Cabrera CNP  
Work Phone:   
[phone]                          [facility]  
Work Phone:   
[phone]  
   
                                                    2021   
15:                              Systolic blood   
pressure                  134 mm[Hg]                Doreen Cabrera CNP  
Work Phone:   
[phone]                          [facility]  
Work Phone:   
[phone]  
   
                                                    2021   
14:          Body height         162.56 cm           Doreen Cabrera CNP  
Work Phone:   
[phone]                          [facility]  
Work Phone:   
[phone]  
   
                                                    2021   
14:                              Body mass index   
(BMI) [Ratio]             49.8 kg/m2                Doreen Cabrera CNP  
Work Phone:   
[phone]                          [facility]  
Work Phone:   
[phone]  
   
                                                    2021   
14:                              Body surface area   
Derived from formula      2.29 m2                   Doreen Cabrera CNP  
Work Phone:   
[phone]                          [facility]  
Work Phone:   
[phone]  
   
                                                    2021   
14:          Body temperature    96.4 [degF]         Doreen Cabrera CNP  
Work Phone:   
[phone]                          [facility]  
Work Phone:   
[phone]  
   
                                                    2021   
14:          Body weight         131.54 kg           Doreen Cabrrea CNP  
Work Phone:   
[phone]                          [facility]  
Work Phone:   
[phone]  
   
                                                    2021   
14:                              Diastolic blood   
pressure                  108 mm[Hg]                Doreen Cabrera CNP  
Work Phone:   
[phone]                          [facility]  
Work Phone:   
[phone]  
   
                                                    2021   
14:          Heart rate          97 /min             Doreen Cabrera CNP  
Work Phone:   
[phone]                          [facility]  
Work Phone:   
[phone]  
   
                                                    2021   
14:          Respiratory rate    18 /min             Doreen Cabrera CNP  
Work Phone:   
[phone]                          [facility]  
Work Phone:   
[phone]  
   
                                                    2021   
14:                              SaO2% (BldA) [Mass   
fraction]                 98 %                      Doreen Cabrera CNP  
Work Phone:   
[phone]                          [facility]  
Work Phone:   
[phone]  
   
                                                    2021   
14:                              Systolic blood   
pressure                  150 mm[Hg]                Doreen Cabrera CNP  
Work Phone:   
[phone]                          [facility]  
Work Phone:   
[phone]  
  
  
  
Encounters  
  
  
                          Encounter Date Encounter Type Care Provider Facility  
   
                                                    Start: 2024  
End: 2024     ambulatory          BRAXTON PATTERSON         Not Available  
   
                                                    Start: 11-  
End: 11-           [facility] visit, estab pt      Doreen Cabrera CNP  
Work Phone:   
[phone]                          [facility]  
Work Phone:   
[phone]  
   
                                                    Start: 2024  
End: 2024     ambulatory          IGNACIO CAMPBELL           Not Available  
   
                                                    Start: 10-  
End: 10-     ambulatory          Braxton Yesenia         [facility]  
Work Phone:   
[phone]  
   
                                                    Start: 10-  
End: 10-           Departed Referred         DO Braxton Yesenia  
Work Phone:   
[phone]                          [facility]   
[facility]  
   
                                                    Start: 10-  
End: 10-     ambulatory          BRAXTON YESENIA         Not Available  
   
                                Start: 10- Non-patient / Non-visit DO Cor  
ey Yesenia  
Work Phone:   
[phone]                          [facility]   
[facility]  
Work Phone:   
[phone]  
   
                                                    Start: 2024  
End: 2024           [facility] visit, estab pt      Radha Young LSW  
Work Phone:   
[phone]                          [facility]  
Work Phone:   
[phone]  
   
                                                    Start: 2024  
End: 2024           [facility] visit, estab pt      Leonie Short LISWS  
Work Phone:   
[phone]                          [facility]  
Work Phone:   
[phone]  
   
                                                    Start: 2024  
End: 2024                         Encounter for   
preprocedural laboratory   
examination                             Doreen Cabrera CNP  
Work Phone:   
[phone]                          [facility]  
Work Phone:   
[phone]  
   
                                                    Start: 2024  
End: 2024           [facility] visit, estab pt      Doreen Cabrera CNP  
Work Phone:   
[phone]                          [facility]  
Work Phone:   
[phone]  
   
                                                    Start: 2024  
End: 2024           [facility] visit, estab pt      Leonie Short LISWS  
Work Phone:   
[phone]                          [facility]  
Work Phone:   
[phone]  
   
                                                    Start: 2024  
End: 2024           FQHC visit, estab pt      Leonie Short LISWS  
Work Phone:   
[phone]                          [facility]  
Work Phone:   
[phone]  
   
                                                    Start: 2024  
End: 2024                         Emergency department   
patient visit             ABRAHAM MARIE          [facility]  
   
                                                    Start: 2024  
End: 2024           FQHC visit, estab pt      Leonie Gerry HUTCHINSON  
Work Phone:   
[phone]                          [facility]  
Work Phone:   
[phone]  
   
                                                    Start: 2024  
End: 2024           General                   Doreen Cabrera CNP  
Work Phone:   
[phone]                          [facility]  
Work Phone:   
[phone]  
   
                                                    Start: 2023  
End: 2023           General                   Brandy Beaulieu DDS  
Work Phone:   
[phone]                          [facility]  
Work Phone:   
[phone]  
   
                                                    Start: 2023  
End: 2023           General                   Brandy Beaulieu DDS  
Work Phone:   
[phone]                          [facility]  
Work Phone:   
[phone]  
   
                                                    Start: 2023  
End: 2023           FQHC visit, estab pt      Doreen Cabrera CNP  
Work Phone:   
[phone]                          [facility]  
Work Phone:   
[phone]  
   
                                        Start: 07-   comprehensive oral   
evaluation - new or   
established patient                     Brandy Beaulieu DDS  
Work Phone:   
[phone]                          [facility]  
   
                                                    Start: 07-  
End: 07-           General                   Yue Ronquillo RDH  
Work Phone:   
[phone]                          [facility]  
Work Phone:   
[phone]  
   
                                                    Start: 10-  
End: 10-     ambulatory          DOREEN CABRERA      [facility]  
   
                                                    Start: 10-  
End: 10-                         Subsequent hospital visit   
by physician                            Doreen Cabrera APRN -   
CNP  
Work Phone:   
[phone]                          [facility]   
[facility] [place]   
[facility]  
   
                                                    Start: 10-  
End: 10-           FQHC visit, estab pt      Doreen Cabrera CNP  
Work Phone:   
[phone]                          [facility]  
Work Phone:   
[phone]  
   
                                                    Start: 2022  
End: 2022           General                   Haylie Aguilar   
[facility]  
Work Phone:   
[phone]                          [facility]  
Work Phone:   
[phone]  
   
                                                    Start: 2022  
End: 2022           ambulatory                Julieth Aliya CNP  
Work Phone:   
[phone]                          [facility]  
Work Phone:   
[phone]  
   
                                                    Start: 2022  
End: 2022           General                   Julieth Aliya CNP  
Work Phone:   
[phone]                          [facility]  
Work Phone:   
[phone]  
   
                                                    Start: 2022  
End: 2022           FQHC visit, estab pt      Haylie Aguilar   
[facility]  
Work Phone:   
[phone]                          [facility]  
Work Phone:   
[phone]  
   
                                                    Start: 2022  
End: 2022           FQHC visit, estab pt      Haylie Aguilar   
[facility]  
Work Phone:   
[phone]                          [facility]  
Work Phone:   
[phone]  
   
                                                    Start: 2022  
End: 2022           ambulatory                Julieth Aliya CNP  
Work Phone:   
[phone]                          [facility]  
Work Phone:   
[phone]  
   
                                                    Start: 2022  
End: 2021           General                   Julieth Aliya CNP  
Work Phone:   
[phone]                          [facility]  
Work Phone:   
[phone]  
   
                                                    Start: 10-  
End: 10-     ambulatory          DOREEN Akers [facility]  
l  
   
                                                    Start: 10-  
End: 10-                         Subsequent hospital visit   
by physician              [facility] Ekg Monitor [facility] EKG  
   
                                        Comment on above:   Tachycardia   
   
                                                    Start: 2021  
End: 2021           FQHC visit, estab pt      Avis Felder   
[facility]  
Work Phone:   
[phone]                          [facility]  
Work Phone:   
[phone]  
   
                                                    Start: 2021  
End: 2021           FQHC visit, estab pt      Avis Felder   
[facility]  
Work Phone:   
[phone]                          [facility]  
Work Phone:   
[phone]  
   
                                                    Start: 09-  
End: 2021                         Evaluation and management   
of inpatient              DAVID BARNES [facility]  
   
                                                    Start: 2021  
End: 09-                         Emergency department   
patient visit             ISMAEL LANDACHIRAG          [facility]  
   
                                                    Start: 2021  
End: 2021                         Emergency department   
patient visit                           Seth Rahman MD  
Work Phone:   
[phone]                          [facility]   
ED  
   
                                        Comment on above:   Bipolar 1 disorder (  
HCC) (Primary Dx);  
Homicidal ideation   
   
                                                    Start: 2021  
End: 2021           FQ visit, estab pt      Leonie HUTCHINSON  
Work Phone:   
[phone]                          [facility]  
Work Phone:   
[phone]  
   
                                                    Start: 2021  
End: 2021                         Emergency department   
patient visit             MALIKA DARNELL [facility]  
   
                                                    Start: 2021  
End: 2021                         Emergency department   
patient visit                           Malika Shepherd MD  
Work Phone:   
[phone]                          [facility]   
ED  
   
                                        Comment on above:   Anxiety state (Prima  
ry Dx)   
   
                                                    Start: 2021  
End: 2021           ambulatory                Pamela Maguire CNP  
Work Phone:   
[phone]                          [facility]  
Work Phone:   
[phone]  
   
                                                    Start: 2021  
End: 2021           General                   Pamela Maguire CNP  
Work Phone:   
[phone]                          [facility]  
Work Phone:   
[phone]  
   
                                                    Start: 2021  
End: 2021           FQHC visit, estab pt      Leonie Short LISWS  
Work Phone:   
[phone]                          [facility]  
Work Phone:   
[phone]  
   
                                                    Start: 2021  
End: 2021           FQHC visit, estab pt      Leonie Short LISWS  
Work Phone:   
[phone]                          [facility]  
Work Phone:   
[phone]  
   
                                                    Start: 2021  
End: 2021                         Emergency department   
patient visit             [facility]  
   
                                                    Start: 2021  
End: 2021                         Emergency department   
patient visit                           [facility] DO  
Work Phone:   
[phone]                          [facility]   
ED  
   
                                        Comment on above:   Depression with suic  
idal ideation (Primary Dx)   
   
                                                    Start: 06-  
End: 06-           FQHC visit, estab pt      Loenie Short LISWS  
Work Phone:   
[phone]                          [facility]  
Work Phone:   
[phone]  
   
                                                    Start: 06-  
End: 06-           FQHC visit, estab pt      Leonie Short LISWS  
Work Phone:   
[phone]                          [facility]  
Work Phone:   
[phone]  
   
                                                    Start: 2021  
End: 2021           FQHC visit, estab pt      Leonie Short LISWS  
Work Phone:   
[phone]                          [facility]  
Work Phone:   
[phone]  
   
                                                    Start: 2021  
End: 2021           FQHC visit new patient    Doreen Cabrera CNP  
Work Phone:   
[phone]                          [facility]  
Work Phone:   
[phone]  
   
                                                    Start: 08-  
End: 08-                         Patient encounter   
procedure                 ANYA GOLDBERG MARSHALL           Facility:  
   
                                                    Start: 2017  
End: 2017                         Emergency department   
patient visit             MD STANLEY NERI     Facility:[facility]  
  
  
  
Procedures  
  
  
                          Date         Procedure    Procedure Detail Performing   
Clinician  
   
                                        Start: 11-   Current tobacco non-  
user   
cad cap copd pv dm                                  Doreen Cabrera CNP  
Work Phone:   
[phone]  
   
                          Start: 11- Dilation and curettage                
Doreen Cabrera CNP  
Work Phone:   
[phone]  
   
                                        Start: 11-   Hemoglobin glycosyla  
geraldine   
a1c                                                 Doreen Cabrera CNP  
Work Phone:   
[phone]  
   
                                        Start: 11-   Most recent diastoli  
c   
blood pressure 80-89 mm   
hg                                                  Doreen Cabrera CNP  
Work Phone:   
[phone]  
   
                                        Start: 11-   Most recent hg a1c>e  
qual   
to 7.0%&<8.0%                                       Doreen Cabrera CNP  
Work Phone:   
[phone]  
   
                                        Start: 11-   Most recent systolic  
   
blood pressure <130 mm hg                           Doreen Cabrera CNP  
Work Phone:   
[phone]  
   
                                        Start: 11-   Urine albumin   
quantitative                                        Doreen Cabrera CNP  
Work Phone:   
[phone]  
   
                                        Start: 2024   Application of silve  
r   
diamine fluoride by   
physician                                           Doreen Cabrera CNP  
Work Phone:   
[phone]  
   
                                        Start: 2024   Behav assmt w/score   
&   
docd/stand instrument                               Radha Young LSW  
Work Phone:   
[phone]  
   
                                        Start: 2024   Eye img vld mtch dx   
7   
stnd fld w/o evc rtnopthy                           Doreen Cabrera CNP  
Work Phone:   
[phone]  
   
                                        Start: 2024   Foot examination   
performed                                           Doreen Cabrera CNP  
Work Phone:   
[phone]  
   
                                        Start: 2024   Fundus photography   
w/interpretation & report                           Doreen Cabrera CNP  
Work Phone:   
[phone]  
   
                                        Start: 2024   Gluc bld gluc mntr d  
ev   
cleared fda spec home use                           Doreen Cabrera CNP  
Work Phone:   
[phone]  
   
                                        Start: 2024   Most recent diastoli  
c   
blood pressure 80-89 mm   
hg                                                  Doreen Cabrera CNP  
Work Phone:   
[phone]  
   
                                        Start: 2024   Most recent systolic  
   
blood press 130-139mm hg                            Doreen Cabrera CNP  
Work Phone:   
[phone]  
   
                                        Start: 2024   Collection venous bl  
ood   
venipuncture                                        Doreen Cabrera CNP  
Work Phone:   
[phone]  
   
                                        Start: 2024   Current tobacco non-  
user   
cad cap copd pv dm                                  Doreen Cabrera CNP  
Work Phone:   
[phone]  
   
                                        Start: 2024   Gluc bld gluc mntr d  
ev   
cleared fda spec home use                           Doreen Cabrera CNP  
Work Phone:   
[phone]  
   
                                        Start: 2024   Most recent hg a1c>e  
qual   
to 7.0%&<8.0%                                       Doreen Cabrera CNP  
Work Phone:   
[phone]  
   
                          Start: 2024 Pneumococcal Prevnar 20               
 Doreen Cabrera CNP  
Work Phone:   
[phone]  
   
                                        Start: 2024   Psychotherapy w/tabatha  
ent   
30 minutes                                          Leonie Garrett Telderi  
Work Phone:   
[phone]  
   
                                        Start: 2024   Behav assmt w/score   
&   
docd/stand instrument                               Leonie Gerry Telderi  
Work Phone:   
[phone]  
   
                                        Start: 2024   Current tobacco non-  
user   
cad cap copd pv dm                                  Doreen Cabrera CNP  
Work Phone:   
[phone]  
   
                                        Start: 2024   Most recent diastoli  
c   
blood pressure 80-89 mm   
hg                                                  Doreen Cabrera CNP  
Work Phone:   
[phone]  
   
                                        Start: 2024   Most recent systolic  
   
blood press 130-139mm hg                            Doreen Cabrera CNP  
Work Phone:   
[phone]  
   
                                        Start: 2024   Pt scrnd tobacco use  
 rcvd   
tobacco cessation talk                              Doreen Cabrera CNP  
Work Phone:   
[phone]  
   
                                        Start: 2024   Current tobacco non-  
user   
cad cap copd pv dm                                  Doreen Cabrera CNP  
Work Phone:   
[phone]  
   
                                        Start: 2024   Gluc bld gluc mntr d  
ev   
cleared fda spec home use                           Doreen Cabrera CNP  
Work Phone:   
[phone]  
   
                                        Start: 2024   Most recent diastoli  
c   
blood pressure 80-89 mm   
hg                                                  Doreen Cabrera CNP  
Work Phone:   
[phone]  
   
                                        Start: 2024   Most recent hemoglob  
in   
a1c level < 7.0%                                    Doreen Cabrera CNP  
Work Phone:   
[phone]  
   
                                        Start: 2024   Most recent systolic  
   
blood press 130-139mm hg                            Doreen Cabrera CNP  
Work Phone:   
[phone]  
   
                                        Start: 2024   Psychotherapy w/tabatha  
ent   
30 minutes                                          Leonie HUTCHINSON  
Work Phone:   
[phone]  
   
                                        Start: 2024   Pt scrnd tobacco use  
 rcvd   
tobacco cessation talk                              Doreen Cabrera CNP  
Work Phone:   
[phone]  
   
                                Start: 2024 Screening for disorder Visit F  
or: Screening   
For Disorder                            Leonie HUTCHINSON  
Work Phone:   
[phone]  
   
                                        Start: 2024   Venereal disease   
screening                               Visit For: Screening   
Exam Std                                Doreen Cabrera CNP  
Work Phone:   
[phone]  
   
                                        Start: 2023   resin-based composit  
e -   
one surface, posterior                              Tazeen Beaulieu DDS  
Work Phone:   
[phone]  
   
                                        Start: 2023   resin-based composit  
e -   
one surface, posterior                              Tazeen Beaulieu DDS  
Work Phone:   
[phone]  
   
                                        Start: 2023   Hemoglobin glycosyla  
geraldine   
a1c                                                 Doreen Cabrera CNP  
Work Phone:   
[phone]  
   
                                        Start: 2023   Most recent diastoli  
c   
blood pressure < 80 mm hg                           Doreen Cabrera CNP  
Work Phone:   
[phone]  
   
                                        Start: 2023   Most recent hemoglob  
in   
a1c level < 7.0%                                    Doreen Cabrera CNP  
Work Phone:   
[phone]  
   
                                        Start: 2023   Most recent systolic  
   
blood pressure <130 mm hg                           Doreen Cabrera CNP  
Work Phone:   
[phone]  
   
                                        Start: 07-   caries risk assessme  
nt   
and documentation, with a   
finding of high risk                                Yue Ronquillo RD  
Work Phone:   
[phone]  
   
                                        Start: 07-   intraoral - complete  
   
series of radiographic   
images                                              Yue Ronquillo RD  
Work Phone:   
[phone]  
   
                                        Start: 07-   panoramic radiograph  
ic   
image                                               Yue Ronquillo RD  
Work Phone:   
[phone]  
   
                          Start: 07- prophylaxis - adult              Tommy Ronquillo [facility]  
Work Phone:   
[phone]  
   
                                        Start: 10-   Most recent diastoli  
c   
blood pressure 80-89 mm   
hg                                                  Doreenpaola Mccormacken CNP  
Work Phone:   
[phone]  
   
                                        Start: 10-   Most recent systolic  
   
blood pressure <130 mm hg                           Doreen Deborah CNP  
Work Phone:   
[phone]  
   
                                        Start: 2022   Psychotherapy w/tabatha  
ent   
30 minutes                                          Haylie Aguilar LPCC[facility]S  
Work Phone:   
[phone]  
   
                                        Start: 2022   Most recent diastoli  
c   
blood pressure 80-89 mm   
hg                                                  Julieth Aliya CNP  
Work Phone:   
[phone]  
   
                                        Start: 2022   Most recent systolic  
   
blood pressure <130 mm hg                           Julieth Aliya CNP  
Work Phone:   
[phone]  
   
                                        Start: 2022   Psychotherapy w/tabatha  
ent   
30 minutes                                          Haylie Aguilar LP[facility]S  
Work Phone:   
[phone]  
   
                                        Start: 2022   Most recent diastol   
blood   
pres >/equal 90 mm hg                               Doreen Mccormacken CNP  
Work Phone:   
[phone]  
   
                                        Start: 2022   Most recent systolic  
   
blood pressure <130 mm hg                           Doreen Cabrera CNP  
Work Phone:   
[phone]  
   
                                        Start: 2022   Psychotherapy w/tabatha  
ent   
30 minutes                                          Haylie Aguilar LP[facility]  
Work Phone:   
[phone]  
   
                                        Start: 2022   Hemoglobin glycosyla  
geraldine   
a1c                                                 Julieth Aliya CNP  
Work Phone:   
[phone]  
   
                                        Start: 2022   Most recent diastol   
blood   
pres >/equal 90 mm hg                               Julieth Aliya CNP  
Work Phone:   
[phone]  
   
                                        Start: 2022   Most recent hemoglob  
in   
a1c level < 7.0%                                    Julieth Aliya CNP  
Work Phone:   
[phone]  
   
                                        Start: 2022   Most recent systolic  
   
blood pres>/equal 140 mm   
hg                                                  Julieth Aliya CNP  
Work Phone:   
[phone]  
   
                                        Start: 2022   Psychotherapy w/tabatha  
ent   
30 minutes                                          Haylie Martinerson [facility]  
Work Phone:   
[phone]  
   
                                        Start: 2021   Antibody hiv-1&hiv-2  
   
single result                                       Doreen Cabrera [facility]  
Work Phone:   
[phone]  
   
                                        Start: 2021   Most recent diastoli  
c   
blood pressure 80-89 mm   
hg                                                  Doreen Cabrera CNP  
Work Phone:   
[phone]  
   
                                        Start: 2021   Most recent systolic  
   
blood press 130-139mm hg                            Doreen Cabrera [facility]  
Work Phone:   
[phone]  
   
                                        Start: 2021   Psychotherapy w/tabatha  
ent   
30 minutes                                          Avis Felder   
[facility]  
Work Phone:   
[phone]  
   
                                        Start: 2021   Pt scrnd tobacco use  
 rcvd   
tobacco cessation talk                              Doreen Cabrera [facility]  
Work Phone:   
[phone]  
   
                          Start: 2021 COVID-19, RAPID              Seth Rahman MD  
Work Phone:   
[phone]  
   
                          Start: 2021 Drug screen class list a              
  Seth Rahman MD  
Work Phone:   
[phone]  
   
                                        Start: 2021   Urnls dip stick/tabl  
et   
reagent auto microscopy                             Seth Rahman MD  
Work Phone:   
[phone]  
   
                          Start: 2021 Assay of acetaminophen                
Seth Rahman MD  
Work Phone:   
[phone]  
   
                          Start: 2021 Assay of ethanol              Meghna Rahman MD  
Work Phone:   
[phone]  
   
                          Start: 2021 Assay of salicylate              Cip  
alysia Rahman MD  
Work Phone:   
[phone]  
   
                                        Start: 2021   Comprehensive metabo  
lic   
panel                                               Seth Rahman MD  
Work Phone:   
[phone]  
   
                                        Start: 2021   Ecg routine ecg w/le  
ast   
12 lds w/i&r                                        Seth Rahman MD  
Work Phone:   
[phone]  
   
                                        Start: 2021   Most recent diastoli  
c   
blood pressure < 80 mm hg                           Doreen Cabrera CNP  
Work Phone:   
[phone]  
   
                                        Start: 2021   Most recent systolic  
   
blood pressure <130 mm hg                           Doreen Cabrera CNP  
Work Phone:   
[phone]  
   
                                        Start: 2021   Psychotherapy w/tabatha  
ent   
30 minutes                                          Leonie Short LISWS  
Work Phone:   
[phone]  
   
                          Start: 2021 COVID-19, RAPID              Malika Shepherd MD  
Work Phone:   
[phone]  
   
                          Start: 2021 Assay of acetaminophen                
Malika Shepherd MD  
Work Phone:   
[phone]  
   
                          Start: 2021 Assay of ethanol              Malika Shepherd MD  
Work Phone:   
[phone]  
   
                          Start: 2021 Assay of salicylate              Maykel Shepherd MD  
Work Phone:   
[phone]  
   
                                        Start: 2021   Comprehensive metabo  
lic   
panel                                               Malika Shepherd MD  
Work Phone:   
[phone]  
   
                          Start: 2021 Drug screen class list a              
  Malika Shepherd MD  
Work Phone:   
[phone]  
   
                                        Start: 2021   Urinalysis microscop  
ic   
only                                                Malika Shepherd MD  
Work Phone:   
[phone]  
   
                                        Start: 2021   Urnls dip stick/tabl  
et   
rgnt auto w/o microscopy                            Malika Shepherd MD  
Work Phone:   
[phone]  
   
                                        Start: 2021   Most recent diastoli  
c   
blood pressure 80-89 mm   
hg                                                  Doreen Cabrera CNP  
Work Phone:   
[phone]  
   
                                        Start: 2021   Most recent systolic  
   
blood pressure <130 mm hg                           Doreen Cabrera CNP  
Work Phone:   
[phone]  
   
                                        Start: 2021   Psychotherapy w/tabatha  
ent   
30 minutes                                          Leonie Short LISWS  
Work Phone:   
[phone]  
   
                                        Start: 2021   Ecg routine ecg w/le  
ast   
12 lds w/i&r                                        Rafael Gallagher DO  
Work Phone:   
[phone]  
   
                          Start: 2021 Assay of acetaminophen                
Rafael Andes DO  
Work Phone:   
[phone]  
   
                          Start: 2021 Assay of ethanol              Rafael  
 Andes DO  
Work Phone:   
[phone]  
   
                          Start: 2021 Assay of salicylate              Jus  
tin Andes DO  
Work Phone:   
[phone]  
   
                                        Start: 2021   Comprehensive metabo  
lic   
panel                                               Rafael Andes DO  
Work Phone:   
[phone]  
   
                          Start: 2021 SPECIMEN REJECTION              Just  
in Andes DO  
Work Phone:   
[phone]  
   
                          Start: 2021 COVID-19, RAPID              Rafael   
Andes DO  
Work Phone:   
[phone]  
   
                          Start: 2021 Drug screen class list a              
  Rafael Andes DO  
Work Phone:   
[phone]  
   
                                        Start: 2021   Urnls dip stick/tabl  
et   
reagent auto microscopy                             Seth Rahman MD  
Work Phone:   
[phone]  
   
                                        Start: 06-   Most recent diastoli  
c   
blood pressure 80-89 mm   
hg                                                  Doreen Cabrera [facility]  
Work Phone:   
[phone]  
   
                                        Start: 06-   Most recent systolic  
   
blood pressure <130 mm hg                           Doreen Cabrera [facility]  
Work Phone:   
[phone]  
   
                                        Start: 06-   Psychotherapy w/tabatha  
ent   
30 minutes                                          Leonie Short LISWS  
Work Phone:   
[phone]  
   
                                        Start: 2021   Ear Pressure Equaliz  
ation   
Tube, Insertion,   
Bilaterally                                         Doreen Cabrera [facility]  
Work Phone:   
[phone]  
   
                                        Start: 2021   Most recent diastol   
blood   
pres >/equal 90 mm hg                               Doreen Cabrera [facility]  
Work Phone:   
[phone]  
   
                                        Start: 2021   Most recent systolic  
   
blood pres>/equal 140 mm   
hg                                                  Doreen Cabrera [facility]  
Work Phone:   
[phone]  
   
                                        Start: 2021   Pt-focused hlth risk  
   
assmt score doc stnd   
instrm                                              Doreen Cabrera [facility]  
Work Phone:   
[phone]  
   
                          Start: 2021 Tonsillectomy              Doreen Cot  
ten LARISSA  
Work Phone:   
[phone]  
  
  
  
Plan of Treatment  
  
  
                          Date         Care Activity Detail       Author  
   
                                Start: 2025 [facility] visit, estab pt Medical E  
stablished   
Patient                                 [facility]  
Work Phone:   
[phone]  
   
                                                    Start: 11-  
End: 11-                         Patient education based   
on identified need                                  [facility]  
   
                                Start: 10- [facility] visit, estab pt Medical E  
stablished   
Patient                                 [facility]  
Work Phone:   
[phone]  
   
                                        Start: 10-   CBC W Auto Different  
ial   
panel - Blood                                       [facility]  
   
                                Start: 2024 [facility] visit, estab pt Medical E  
stablished   
Patient                                 [facility]  
Work Phone:   
[phone]  
   
                                                    Start: 2024  
End: 2024                         Patient education based   
on identified need                                  [facility]  
   
                          Start: 2024              US Transvaginal ([id number])  
 [facility]  
   
                                                    Start: 2024  
End: 2024                         Patient education based   
on identified need                                  [facility]  
   
                                Start: 2024 [facility] visit, estab pt Medical E  
stablished   
Patient                                 [facility]  
Work Phone:   
[phone]  
   
                                                    Start: 2024  
End: 2024                         Patient education based   
on identified need                                  [facility]  
   
                                        Start: 2024   Ferritin [Mass/volum  
e]   
in Serum or Plasma                                  [facility]  
   
                                                    Start: 2024  
End: 2024                         Patient education based   
on identified need                                  [facility]  
   
                                Start: 2024 [facility] visit, estab pt Medical E  
stablished   
Patient                                 [facility]  
Work Phone:   
[phone]  
   
                                Start: 2024                 All 3 Urine Te  
st   
Gonorrhea-Chlamydia-Tri  
[facility]  
   
                                                    Start: 2024  
End: 2024                         Patient education based   
on identified need                                  [facility]  
   
                                                    Start: 2023  
End: 2023                         Patient education based   
on identified need                                  [facility]  
   
                          Start: 2023              Psychiatry   [facility]  
Work Phone:   
[phone]  
   
                                        Comment on above:   Note: Please make a   
referral to: see if dr alonso accepting   
now,   
otherwise ok with arminda or anjelica   
   
                                Start: 2023 [facility] visit, estab pt Medical E  
stablished   
Patient                                 [facility]  
Work Phone:   
[phone]  
   
                                                    Start: 10-  
End: 10-                         Patient education based   
on identified need                                  [facility]  
   
                                Start: 2022                 All 3 Urine Te  
st   
Gonorrhea-Chlamydia-Tri  
chomonas                                [facility]  
   
                                Start: 09- [facility] visit, estab pt Medical E  
stablished   
Patient                                 [facility]  
Work Phone:   
[phone]  
   
                                                    Start: 2022  
End: 2022                         Patient education based   
on identified need                      [facility] offered active   
listening and   
supportive feedback;   
normalized emotions and   
feelings, also provided   
pt time to process any   
current stressors.   
~Promoted and   
encouraged   
follow-through with   
scheduling psychiatric   
services                                [facility]  
   
                                                    Start: 2022  
End: 2022                         Patient education based   
on identified need                                  [facility]  
   
                          Start: 2022                           [facility]  
   
                          Start: 2022 [facility] visit, estab pt              Ti  
[facility]  
Work Phone:   
[phone]  
   
                                        Comment on above:   Note: Please make a   
referral to: request dr alonso   
   
                                                    Start: 2022  
End: 2022                         Patient education based   
on identified need                                  [facility]  
   
                                                    Start: 2022  
End: 2022                         Patient education based   
on identified need                      [facility] introduced pt to   
[facility] integrated model   
of care ~[facility] offered   
active listening and   
supportive feedback;   
normalized emotions and   
feelings, also provided   
pt time to process any   
current stressors ~[facility]   
discussed potential   
benefits of counseling   
and supported   
re-engaging, as needed.   
~[facility] encouraged pt to   
continue to make time   
to implement self-care   
regimen and use coping   
methods, as needed                      [facility]  
   
                                        Start: 2022   CBC W Auto Different  
ial   
panel - Blood                                       [facility]  
   
                                        Start: 2022   Lipid 1996 panel - S  
wayne   
or Plasma                 LIPID PROFILE             [facility]  
   
                                                    Start: 2022  
End: 2022                         Patient education based   
on identified need                                  [facility]  
   
                          Start: 2022 Influenza vaccination Flu vaccine (#  
1) [facility]  
   
                                        Start: 2021   COVID-19 Vaccine (3   
-   
Booster for Pfizer   
series)                                 COVID-19 Vaccine (3 -   
Booster for Pfizer   
series)                                 [facility]  
   
                          Start: 10-                           [facility]  
   
                                Start: 2021 [facility]HC visit, estab pt Medical E  
stablished   
Patient                                 [facility]  
Work Phone:   
[phone]  
   
                          Start: 2021                           [facility]  
Work Phone:   
[phone]  
   
                                        Comment on above:   Note: Please make a   
referral to: [address] [id number]: [phone]:   
[phone]   
   
                                                            Note: Please make a   
referral to: [facility] , no longer wants to   
see vinny   
   
                                                    Start: 2021  
End: 2021                         Patient education based   
on identified need                                  [facility]  
   
                          Start: 2021 Influenza vaccination              M  
[facility]  
Work Phone:   
[phone]  
   
                                                    Start: 2021  
End: 2021                         Patient education based   
on identified need                                  [facility]  
   
                          Start: 2021              SARS-CoV-2, [facility]   
[facility]  
   
                                                    Start: 2021  
End: 2021                         Patient education based   
on identified need                                  [facility]  
   
                                                    Start: 06-  
End: 06-                         Patient education based   
on identified need                                  [facility]  
   
                                                    Start: 2021  
End: 2021                         Patient education based   
on identified need                                  [facility]  
   
                                        Start: 2020   DTaP/Tdap/Td vaccine  
 (7   
- Td or Tdap)                           DTaP/Tdap/Td vaccine (7   
- Td or Tdap)                           [facility]  
   
                                        Start: 2020   Screening for malign  
ant   
neoplasm of cervix                                  [facility]  
   
                                        Start: 2018   DTaP/Tdap/Td vaccine  
 (1   
- Tdap)                                 DTaP/Tdap/Td vaccine (1   
- Tdap)                                 [facility]  
Work Phone:   
[phone]  
   
                          Start: 2017 Hepatitis C screening Hepatitis C sc  
reen [facility]  
   
                                        Start: 2015   Screening for Chlamy  
argelia   
trachomatis                                         [facility]  
   
                          Start: 2014 HIV screening HIV screen   [facility]  
Work Phone:   
[phone]  
   
                          Start: 2011 COVID-19 Vaccine (1) COVID-19 Vaccin  
e (1) [facility]  
Work Phone:   
[phone]  
   
                          Start: 2011 [facility]  
[facility]  
   
                                        Start: 2010   HPV vaccine (1 - 2-d  
ose   
series)                                 HPV vaccine (1 - 2-dose   
series)                                 [facility]  
   
                                        Start: 2000   Varicella vaccine (1  
 of   
2 - 2-dose childhood   
series)                                 Varicella vaccine (1 of   
2 - 2-dose childhood   
series)                                 [facility]  
   
                          Start: 1999 Hepatitis C screening Hepatitis C [facility]  
Work Phone:   
[phone]  
   
                                                      
End: 10-                         C.trachomatis   
N.gonorrhoeae DNA, Urine                            [facility]  
Work Phone:   
[phone]  
   
                                        Comment on above:   Once for 1 Occurrenc  
es starting 10/21/2022 until 10/21/2022   
   
                                                EKG 12 Lead     EKG 12 Lead ECG   
STAT   
2021 3:18 PM EDT                  [facility]  
Work Phone:   
[phone]  
   
                                                      
End: 10-                         Trichomonas Vaginali,   
Molecular                                           [facility]  
Work Phone:   
[phone]  
   
                                        Comment on above:   Once for 1 Occurrenc  
es starting 10/21/2022 until 10/21/2022   
  
  
  
Immunizations  
  
  
                      Immunization Date Immunization Notes      Care Provider Mandeep deshpande  
   
                                        11-          Human Papillomavirus  
   
9-valent vaccine;   
Translations: [GARDASIL   
9]                                                  Doreen Cabrera [facility]  
Work Phone:   
[phone]                          [facility]  
   
                                        Comment on above:   Note: Patient tolera  
geraldine well. No signs or symptoms of adverse   
reactions. Patient waited a minimum of 15 minutes.   
   
                                        11-          Imm.Admin. over 18 y  
rs   
Any Route FIRST Injection                           Doreen Cabrera LARISSA  
Work Phone:   
[phone]                          [facility]  
   
                                        2024          influenza, injectabl  
e,   
quadrivalent,   
preservative free;   
Translations: [Fluarix]                             Doreen Cabrera [facility]  
Work Phone:   
[phone]                          [facility]  
   
                                        Comment on above:   Note: Patient tolera  
geraldine well. No signs or symptoms of adverse   
reactions. Patient waited a minimum of 15 minutes.   
   
                                        2024          influenza, seasonal,  
   
injectable, preservative   
free; Translations:   
[Fluarix]                                           Doreen Cabrera [facility]  
Work Phone:   
[phone]                          [facility]  
   
                                        Comment on above:   Note: Patient tolera  
geraldine well. No signs or symptoms of adverse   
reactions. Patient waited a minimum of 15 minutes.   
   
                                        2024          Imm.Admin. over 18 y  
rs   
Any Route FIRST Injection   
(Rendering physician   
modifier)                                           Doreen Cabrera [facility]  
Work Phone:   
[phone]                          [facility]  
   
                                        2024          Human Papillomavirus  
   
9-valent vaccine;   
Translations: [GARDASIL   
9]                                                  Doreen Cabrera [facility]  
Work Phone:   
[phone]                          [facility]  
   
                                        2024          pneumococcal vaccine  
,   
unspecified formulation                             Doreen Cabrera [facility]  
Work Phone:   
[phone]                          [facility]  
   
                                        Comment on above:   Note: Patient tolera  
geraldine well. No signs or symptoms of adverse   
reactions. Patient waited a minimum of 15 minutes.   
   
                                        2024          Imm.Admin.Through 17  
 yrs   
Any Route FIRST Injection                           Doreen Deborah [facility]  
Work Phone:   
[phone]                          [facility]  
   
                                        2024          Ea.Addnl.Imm.Admin.T  
hroug  
h 17 yrs Any Route                                  Doreen Deborah [facility]  
Work Phone:   
[phone]                          [facility]  
   
                                        2024          VFC Imm. Admin. thro  
ugh   
17 yrs Any Route per VFC   
Injection (Signi/Sep   
Eval. & Man.)                                       Doreen Deborah DIAS  
Work Phone:   
[phone]                          [facility]  
   
                                        10-          influenza, injectabl  
e,   
quadrivalent,   
preservative free;   
Translations: [Fluarix]                             Doreen Cabrera [facility]  
Work Phone:   
[phone]                          [facility]  
   
                                        Comment on above:   Note: Patient tolera  
geraldine well. No signs or symptoms of adverse   
reactions. Patient waited a minimum of 15 minutes.   
   
                                        10-          influenza, seasonal,  
   
injectable, preservative   
free; Translations:   
[Fluarix]                                           Doreen Cabrera [facility]  
Work Phone:   
[phone]                          [facility]  
   
                                        Comment on above:   Note: Patient tolera  
geraldine well. No signs or symptoms of adverse   
reactions. Patient waited a minimum of 15 minutes.   
   
                                        10-          Imm.Admin. over 17 y  
rs   
Any Route FIRST Injection                           Doreen Cabrera [facility]  
Work Phone:   
[phone]                          [facility]  
   
                                        2021          1st Dose PFIZER COVI  
D-19   
Vaccine                                             Doreen Cabrera [facility]  
Work Phone:   
[phone]                          [facility]  
   
                                        2021          1st Dose PFIZER COVI  
D-19   
Vaccine                                             Doreen Cabrera [facility]  
Work Phone:   
[phone]                          [facility]  
   
                                        2016          meningococcal   
oligosaccharide (groups   
A, C, Y and W-135)   
diphtheria toxoid   
conjugate vaccine (MCV4O)                           Doreen Cabrera [facility]  
Work Phone:   
[phone]                          [facility]  
   
                                        2010          tetanus toxoid, redu  
kyleigh   
diphtheria toxoid, and   
acellular pertussis   
vaccine, adsorbed                                   Doreen Cabrera [facility]  
Work Phone:   
[phone]                          [facility]  
   
                                        2004          measles, mumps and   
rubella virus vaccine                               Doreen Cabrera [facility]  
Work Phone:   
[phone]                          [facility]  
   
                                        2004          poliovirus vaccine,   
inactivated                                         Doreen Cabrera [facility]  
Work Phone:   
[phone]                          [facility]  
   
                                        2000          measles, mumps and   
rubella virus vaccine                               Doreen Cabrera [facility]  
Work Phone:   
[phone]                          [facility]  
   
                                        2000          poliovirus vaccine,   
inactivated                                         Doreen Cabrera [facility]  
Work Phone:   
[phone]                          [facility]  
   
                                        2000          haemophilus influenz  
ae   
type b conjugate and   
Hepatitis B vaccine                                 Doreen Cabrera [facility]  
Work Phone:   
[phone]                          [facility]  
   
                                        1999          poliovirus vaccine,   
inactivated                                         Doreen Cabrera [facility]  
Work Phone:   
[phone]                          [facility]  
   
                                        1999          diphtheria, tetanus   
toxoids and pertussis   
vaccine                                             Doreen Cabrera [facility]  
Work Phone:   
[phone]                          [facility]  
   
                                        1999          trivalent poliovirus  
   
vaccine, live, oral                                 Doreen Cabrera [facility]  
Work Phone:   
[phone]                          [facility]  
   
                                        1999          hepatitis B vaccine,  
   
pediatric or   
pediatric/adolescent   
dosage                                              Doreen Cabrera CNP  
Work Phone:   
[phone]                          [facility]  
   
                                        1999          hepatitis B vaccine,  
   
pediatric or   
pediatric/adolescent   
dosage                                              Doreen Cabrera CNP  
Work Phone:   
[phone]                          [facility]  
  
  
  
Payers  
  
  
                          Date         Payer Category Payer        Policy ID  
   
                          10-   Self-pay                  [id number]  
[id number]  
   
                          2021   Unknown                   [id number]   
[id number]  
   
                          1999   Unknown                   [id number] 2.16.84  
[id number]  
   
                          1999   Unknown                   [id number] 2.16.8  
[id number]  
   
                          1999   Unknown                   [id number] 2.16.8  
[id number]  
   
                          1999   Unknown                   [id number] 2.16.8  
[id number]  
   
                          1999   Unknown                   [id number] 2.16.8  
[id number]  
   
                          1999   Unknown                   [id number] 2.16.8  
[id number]  
   
                          1999   Unknown                   [id number] 2.16.  
[id number]  
   
                          1999   Unknown                   [id number] 2.16.8  
[id number]  
   
                          1999   Unknown                   [id number] 2.16.8  
[id number]  
   
                          1999   Unknown                   [id number] 2.16.84  
[id number]  
   
                          1999   Unknown                   [id number] 2.16.84  
[id number]  
   
                          1999   Unknown                   [id number] 2.16.84  
[id number]  
   
                          1960   Private Health Insurance              [id number]  
   
                                       Unknown                   [id number]  
   
                                       Unknown      O          [id number]   
[id number]  
   
                                       Unknown                   [id number] 2.16.8  
[id number]  
  
  
  
Social History  
  
  
                          Date         Type         Detail       Facility  
   
                                                Assertion       Family problems   
(finding)                               [facility]  
   
                                                [facility]       Problem situatio  
n   
relating to social and   
personal history   
(finding)                               [facility]  
   
                                                [facility]       Emotional stress  
   
(finding)                               [facility]  
   
                                                [facility]       Lives with paren  
ts   
(finding)                               [facility]  
   
                                       [facility]    Social drinker (finding) Hea  
[facility]  
   
                                                Assertion       Benzodiazepine m  
isuse   
(finding)                               [facility]  
   
                                                [facility]       Sexually active   
(finding)                               [facility]  
   
                                       [facility]                 [facility]   
[facility]  
   
                                                [facility]       Gender identity   
finding   
(finding)                               [facility]  
   
                                                [facility]       Finding of sexua  
l   
orientation (finding)                   [facility]  
   
                                                            Tobacco smoking   
status                    Unknown if ever smoked    [facility]  
Work Phone:   
[phone]  
   
                                                    Start: 10-  
End: 2021                         Tobacco smoking   
status NHIS               Never smoker              [employer]  
Work Phone:   
[phone]  
   
                                                    Start: 10-  
End: 2021                         Tobacco use and   
exposure                  Never used                [employer]  
   
                                                    Start: 2021  
End: 2021     Alcohol intake      Ex-drinker (finding) [employer] Phone:   
[phone]  
   
                                        Start: 2021   History SDOH Alcohol  
   
Frequency                 1                         [employer] Phone:   
[phone]  
   
                                        Start: 1999   Sex Assigned At   
Birth                     Not on file               [employer] Phone:   
[phone]  
   
                                                            Exposure to   
SARS-CoV-2 (event)        Not sure                  [facility]  
   
                                                Assertion       Smoking monitori  
ng   
status (finding)                        [facility]  
Work Phone:   
[phone]  
   
                                                Assertion       Cigarette smoker  
   
(finding)                               [facility]  
   
                                                Assertion       Light cigarette   
smoker   
(1-9 cigs/day) (finding)                [facility]  
   
                                                Assertion       Exposure to poll  
ution   
(event)                                 [facility]  
   
                          Start: 2020 Assertion    Smoker (finding) Health  
 [facility]  
   
                                                Assertion       Finding relating  
 to   
sexual activity   
(finding)                               [facility]  
   
                                                Assertion       Homosexual behav  
ior   
(finding)                               [facility]  
   
                                                Assertion       Currently not se  
xually   
active (finding)                        [facility]  
   
                                                Assertion       Details of drug   
misuse   
behavior (observable   
entity)                                 [facility]  
   
                                                Assertion       History of disor  
danny   
(situation)                             [facility]  
   
                                        Start: 1999   Sex Assigned At   
Birth                     Female                    [facility]  
   
                                                    NEGATED: Highlighted   
row                       Assertion                 Current drinker of   
alcohol (finding)                       [facility]  
   
                                                    NEGATED: Highlighted   
row                       Assertion                 Finding relating to drug   
misuse behavior   
(finding)                               [facility]  
   
                                                    NEGATED: Highlighted   
row                 Assertion                               [facility]  
   
                                                    NEGATED: Highlighted   
row                       Assertion                 Exposure to pollution   
(event)                                 [facility]  
  
  
  
Medical Equipment  
  
  
                                Procedure Code  Equipment Code  Equipment Origin  
al   
Text                                    Equipment   
Identifier                              Dates  
   
                                                                Blood Glucose Te  
st In   
Vitro Strip               [id number]                  Start:   
2024  
End:   
2024  
   
                                                                [employer] Lancets  
   
Miscellaneous             [id number]                  Start:   
2024  
   
                                                                OneTouch Ultra I  
n   
Vitro Strip               [id number]                  Start:   
2024  
End:   
2025  
  
  
  
Mental Status  
  
  
                          Date         Assessment   Result       Facility  
   
                                2020      Cognitive function A previous haider  
icide attempt    
with hospitalization at [address] [place] [address]  
Work Phone:   
[phone]  
   
                                                Cognitive function Cognitive fun  
ctioning was   
normal Cognitive function   
finding (finding)                       [facility]  
Work Phone:   
[phone]  
  
  
  
Clinical Notes 2021 to 11-  
  
  
                                Note Date & Type Note            Facility  
  
  
  
                                        11- Evaluation note   
  
  
Includes: Assessments for all patient encounters  
  
  
  
                                                    Findings  
   
                                                    [Z68.43 - Body mass index   
[BMI] 50.0-59.9, adult]   
assessment of body mass index           Medical Established Patient   
with Doreen Cabrera CNP                   11/15/2024  
  
  
  
                                                    Last Documented On 11/15/202  
4 5:25PM ; [facility]  
  
  
  
                                        Encounter for Immunization Medical Estab  
lished Patient with Doreen Cabrera   
CNP                                     11/15/2024  
  
  
  
                                                    Last Documented On 11/15/202  
4 5:25PM ; [facility]  
  
  
  
                                                    Type 2 diabetes mellitus wit  
hout   
complication                            Medical Established Patient with   
Doreenpaola Cabrera CNP                        11/15/2024  
  
  
  
                                                    Last Documented On 11/15/202  
4 5:25PM ; [facility]  
  
  
  
                                                    [D50.9 - Iron deficiency ane  
jennifer,   
unspecified] iron deficiency anemia Chart Update with Doreen Cabrera CNP   
10/07/2024  
  
  
  
                                                    Last Documented On 10/09/202  
4 2:06PM ; [facility]  
  
  
  
                                        Attention-deficit hyperactivity disorder  
  Established Patient with Radha   
Young LSW                               2024  
  
  
  
                                                    Last Documented On   
4 4:15PM ; [facility]  
  
  
  
                                                    Bipolar I disorder, most rec  
ent   
episode, depressed - mild               BH Established Patient with Radha   
Young LSW                               2024  
  
  
  
                                                    Last Documented On   
4 4:15PM ; [facility]  
  
  
  
                                Nicotine dependence BH Established Patient with   
Radha Young LSW 2024  
  
  
  
                                                    Last Documented On   
4 4:15PM ; [facility]  
  
  
  
                                        Post-traumatic stress disorder BH Establ  
ished Patient with Radha Young   
LSW                                     2024  
  
  
  
                                                    Last Documented On   
4 4:15PM ; [facility]  
  
  
  
                                                    [Z68.43 - Body mass index [B  
MI]   
50.0-59.9, adult] assessment of body   
mass index                              Medical Established Patient with   
Doreen Cabrera CNP                        2024  
  
  
  
                                                    Last Documented On 10/09/202  
4 2:09PM ; [facility]  
  
  
  
                                                    Bipolar I disorder, most rec  
ent   
episode, depressed - mild               Medical Established Patient with Doreen Cabrera CNP                              2024  
  
  
  
                                                    Last Documented On 10/09/202  
4 2:09PM ; [facility]  
  
  
  
                                [place]          Medical Established Patient with  
 Doreen Deborah CNP 2024  
  
  
  
                                                    Last Documented On 10/09/202  
4 2:09PM ; [facility]  
  
  
  
                                        Encounter for Immunization Medical Estab  
lished Patient with Doreen Deborah   
CNP                                     2024  
  
  
  
                                                    Last Documented On 10/09/202  
4 2:09PM ; [facility]  
  
  
  
                                                    Type 2 diabetes mellitus wit  
hout   
complication                            Medical Established Patient with   
Doreen Deborah CNP                        2024  
  
  
  
                                                    Last Documented On 10/09/202  
4 2:09PM ; [facility]  
  
  
  
                                        Attention-deficit hyperactivity disorder  
 BH Established Patient with   
Leonie Short LISWS                     2024  
  
  
  
                                                    Last Documented On   
4 3:28PM ; [facility]  
  
  
  
                                                    Bipolar I disorder, most rec  
ent   
episode, depressed - mild               BH Established Patient with Leonie   
Short LISWS                             2024  
  
  
  
                                                    Last Documented On   
4 3:28PM ; [facility]  
  
  
  
                                        Post-traumatic stress disorder BH Establ  
ished Patient with Leonie Short   
LISWS                                   2024  
  
  
  
                                                    Last Documented On   
4 3:28PM ; [facility]  
  
  
  
                                                    [E11.9 - Type 2 diabetes lobito  
litus   
without complications] type 2 diabetes   
mellitus                                Medical Established Patient with   
Doreen Cabrera CNP                        2024  
  
  
  
                                                    Last Documented On   
4 7:36PM ; [facility]  
  
  
  
                                                    [F41.1 - Generalized anxiety  
   
disorder] generalized anxiety   
disorder                                Medical Established Patient with   
Doreen Cabrera CNP                        2024  
  
  
  
                                                    Last Documented On   
4 7:36PM ; [facility]  
  
  
  
                                                    [I10 - Essential (primary)   
hypertension] essential hypertension    Medical Established Patient with   
Doreen Deborah CNP                        2024  
  
  
  
                                                    Last Documented On   
4 7:36PM ; [facility]  
  
  
  
                                                    [N94.6 - Dysmenorrhea, unspe  
cified]   
dysmenorrhea                            Medical Established Patient with   
Doreen Deborah CNP                        2024  
  
  
  
                                                    Last Documented On   
4 7:36PM ; [facility]  
  
  
  
                                                    [Z68.43 - Body mass index [B  
MI]   
50.0-59.9, adult] assessment of body   
mass index                              Medical Established Patient with   
Doreenpaola Cabrera CNP                        2024  
  
  
  
                                                    Last Documented On   
4 7:36PM ; [facility]  
  
  
  
                                        Encounter for Immunization Medical Estab  
lished Patient with Doreen Cabrera   
CNP                                     2024  
  
  
  
                                                    Last Documented On   
4 7:36PM ; [facility]  
  
  
  
                                        Venipuncture was performed Medical Estab  
lished Patient with Doreen Cabrera   
CNP                                     2024  
  
  
  
                                                    Last Documented On   
4 7:36PM ; [facility]  
  
  
  
                                        Attention-deficit hyperactivity disorder  
  Established Patient with   
Leonie Short LISWS                     2024  
  
  
  
                                                    Last Documented On   
4 10:10AM ; [facility]  
  
  
  
                                                    Bipolar I disorder, most rec  
ent   
episode, depressed - mild                Established Patient with Leonie   
Short LISWS                             2024  
  
  
  
                                                    Last Documented On   
4 10:10AM ; [facility]  
  
  
  
                                        Post-traumatic stress disorder  Establ  
ished Patient with Leonie Short   
LISWS                                   2024  
  
  
  
                                                    Last Documented On   
4 10:10AM ; [facility]  
  
  
  
                                        [D64.9 - Anemia, unspecified] anemia Med  
ical Established Patient with   
Doreen Cabrera CNP                        2024  
  
  
  
                                                    Last Documented On   
4 6:51PM ; [facility]  
  
  
  
                                                    [Z68.43 - Body mass index [B  
MI]   
50.0-59.9, adult] assessment of body   
mass index                              Medical Established Patient with   
Doreen Cabrera CNP                        2024  
  
  
  
                                                    Last Documented On   
4 6:51PM ; [facility]  
  
  
  
                                                    Bipolar affective disorder,   
current   
episode depressed, mild                  Established Patient with Leonie   
Short LISWS                             2024  
  
  
  
                                                    Last Documented On   
4 5:16PM ; [facility]  
  
  
  
                                        Post-traumatic stress disorder  Establ  
ished Patient with Leonie Short   
LISWS                                   2024  
  
  
  
                                                    Last Documented On   
4 5:16PM ; [facility]  
  
  
  
                                                    Undifferentiated attention d  
eficit   
disorder                                 Established Patient with   
Leonie Short LISWS                     2024  
  
  
  
                                                    Last Documented On   
4 5:16PM ; [facility]  
  
  
  
                                        Visit for: screening for disorder  Est  
ablished Patient with Leonie   
Short LISWS                             2024  
  
  
  
                                                    Last Documented On   
4 5:16PM ; [facility]  
  
  
  
                                                    [Z68.43 - Body mass index [B  
MI]   
50.0-59.9, adult] assessment of body   
mass index                              Medical Established Patient with   
Doreen Cabrera CNP                        2024  
  
  
  
                                                    Last Documented On   
4 7:50PM ; [facility]  
  
  
  
                                        Attention-deficit hyperactivity disorder  
 Medical Established Patient with   
Doreen Cabrera CNP                        2024  
  
  
  
                                                    Last Documented On   
4 7:50PM ; [facility]  
  
  
  
                                                    Diabetes Risk Test Score was  
 three   
score 3/27/2024                         Medical Established Patient with Doreen Cabrera CNP                              2024  
  
  
  
                                                    Last Documented On   
4 7:50PM ; [facility]  
  
  
  
                                        Visit for: screening for STD Medical Est  
ablished Patient with Doreen Cabrera   
CNP                                     2024  
  
  
  
                                                    Last Documented On   
4 7:50PM ; [facility]  
  
  
  
                                                    [Z68.32 - Body mass index [B  
MI]   
32.0-32.9, adult] assessment of body   
mass index                              Medical Established Patient with   
Doreen Cabrera CNP                        2023  
  
  
  
                                                    Last Documented On   
3 6:01PM ; [facility]  
  
  
  
                                        Borderline personality disorder Medical   
Established Patient with Doreen Cabrera CNP                              2023  
  
  
  
                                                    Last Documented On   
3 6:01PM ; [facility]  
  
  
  
                                        Screening for diabetes mellitus Medical   
Established Patient with Doreen Cabrera CNP                              2023  
  
  
  
                                                    Last Documented On   
3 6:01PM ; [facility]  
  
  
  
                                        Assessment of body mass index Medical Es  
tablished Patient with Doreen Cabrera CNP                              10/21/2022  
  
  
  
                                                    Last Documented On 10/21/202  
2 2:45PM ; [facility]  
  
  
  
                                                    Bipolar affective disorder,   
current   
episode manic                            Tele[facility] with Haylienicanor Martinerson LPCC-S                        2022  
  
  
  
                                                    Last Documented On   
2 3:22PM ; [facility]  
  
  
  
                                                    Bipolar affective disorder,   
current   
episode manic                           BH Established Patient with Haylie   
Aguilar LPCC-S                        2022  
  
  
  
                                                    Last Documented On   
2 2:28PM ; [facility]  
  
  
  
                                                    Bipolar affective disorder,   
current   
episode depressed, severe with   
psychosis                                Telebehavioral Health with Haylienicanor Aguilar LPCC-S                        2022  
  
  
  
                                                    Last Documented On   
2 3:29PM ; [facility]  
  
  
  
                                                    Assessment of body mass inde  
x [Body   
mass index [BMI] 50.0-59.9, adult]      Open Access - Established with Julieth   
Aliya CNP                               2022  
  
  
  
                                                    Last Documented On   
2 9:36AM ; [facility]  
  
  
  
                                                    Bipolar I disorder, most rec  
ent   
episode, manic                          Open Access - Established with Julieth   
Aliya CNP                               2022  
  
  
  
                                                    Last Documented On   
2 9:36AM ; [facility]  
  
  
  
                                        Post-traumatic stress disorder BH Establ  
ished Patient with Haylie Aguilar   
LPCC-S                                  2022  
  
  
  
                                                    Last Documented On   
2 3:20PM ; [facility]  
  
  
  
                                No cough        Medical Established Patient with  
 Doreen Deborah CNP 2022  
  
  
  
                                                    Last Documented On   
2 4:04PM ; [facility]  
  
  
  
                                                    Z68.43 - Body mass index [BM  
I]   
50.0-59.9, adult                        Medical Established Patient with   
Doreen Deborah CNP                        2022  
  
  
  
                                                    Last Documented On   
2 4:04PM ; [facility]  
  
  
  
                                                    Borderline personality disor  
danny Pt   
reported hx of sx/dx                     Established Patient with Haylienicanor Martinerson LPCC-S                        2022  
  
  
  
                                                    Last Documented On   
2 4:08PM ; [facility]  
  
  
  
                                                    Assessment of body mass inde  
x [Body   
mass index [BMI] 50.0-59.9, adult]      Open Access - Established with Julieth   
Aliya CNP                               2022  
  
  
  
                                                    Last Documented On   
2 7:41PM ; [facility]  
  
  
  
                                                    Diabetes Risk Test Score was  
 three   
score 2022                         Open Access - Established with Julieth   
Aliya CNP                               2022  
  
  
  
                                                    Last Documented On   
2 7:41PM ; [facility]  
  
  
  
                                                    Bipolar I disorder, most rec  
ent   
episode, manic                          BH Established Patient with Avis   
Felder LPCC-S                          2021  
  
  
  
                                                    Last Documented On   
1 1:35AM ; [facility]  
  
  
  
                                        Borderline personality disorder BH Estab  
lished Patient with Avis   
Felder LPCC-S                          2021  
  
  
  
                                                    Last Documented On   
1 1:35AM ; [facility]  
  
  
  
                                        Post-traumatic stress disorder  Establ  
ished Patient with Avis Bandamons LPCC-S                          2021  
  
  
  
                                                    Last Documented On   
1 1:35AM ; [facility]  
  
  
  
                                                    Assessment of visit for: scr  
eening for   
human immunodeficiency virus            Medical Established Patient with   
Doreen Deborah CNP                        2021  
  
  
  
                                                    Last Documented On   
1 2:56PM ; [facility]  
  
  
  
                                Nicotine dependence Medical Established Patient   
with Doreen Deborah CNP 2021  
  
  
  
                                                    Last Documented On   
1 2:56PM ; [facility]  
  
  
  
                                Tachycardia     Medical Established Patient with  
 Doreen Deborah CNP 2021  
  
  
  
                                                    Last Documented On   
1 2:56PM ; [facility]  
  
  
  
                                                    Z68.43 - Body mass index [BM  
I]   
50.0-59.9, adult                        Medical Established Patient with   
Doreen Deborah CNP                        2021  
  
  
  
                                                    Last Documented On   
1 2:56PM ; [facility]  
  
  
  
                                                    Bipolar I disorder, most rec  
ent   
episode, manic                           Established Patient with Leonie   
Short LISWS                             2021  
  
  
  
                                                    Last Documented On   
1 10:17AM ; [facility]  
  
  
  
                                                    Borderline personality disor  
danny per   
patient reported history                 Established Patient with Leonie   
Short LISWS                             2021  
  
  
  
                                                    Last Documented On   
1 10:17AM ; [facility]  
  
  
  
                                Nicotine dependence  Established Patient with   
Leonie Short LISWS 2021  
  
  
  
                                                    Last Documented On   
1 10:17AM ; [facility]  
  
  
  
                                        Post-traumatic stress disorder  Establ  
ished Patient with Leonie Short   
LISWS                                   2021  
  
  
  
                                                    Last Documented On   
1 10:17AM ; [facility]  
  
  
  
                                Body mass index Medical Established Patient with  
 Doreen Deborah CNP 2021  
  
  
  
                                                    Last Documented On   
1 5:23PM ; [facility]  
  
  
  
                                Morbid obesity  Medical Established Patient with  
 Doreen Deborah CNP 2021  
  
  
  
                                                    Last Documented On   
1 5:23PM ; [facility]  
  
  
  
                                        Nicotine dependence uncomplicated Medica  
l Established Patient with Doreen   
Deborah CNP                              2021  
  
  
  
                                                    Last Documented On   
1 5:23PM ; [facility]  
  
  
  
                                                    Z68.42 - Body mass index [BM  
I]   
45.0-49.9, adult                        Medical Established Patient with   
Doreen Deborah CNP                        2021  
  
  
  
                                                    Last Documented On   
1 5:23PM ; [facility]  
  
  
  
                                Episodic mood disorders On Call with Pamela edwards CNP 2021  
  
  
  
                                                    Last Documented On   
1 7:49AM ; [facility]  
  
  
  
                                                    Mood disorders, NOS per tabatha  
ent   
reported history                         Established Patient with Leonie   
Short LISWS                             2021  
  
  
  
                                                    Last Documented On   
1 7:15PM ; [facility]  
  
  
  
                                        Post-traumatic stress disorder  Establ  
ished Patient with Leonie Short   
LISWS                                   2021  
  
  
  
                                                    Last Documented On   
1 7:15PM ; [facility]  
  
  
  
                                Morbid obesity  Medical Established Patient with  
 Doreen Deborah CNP 2021  
  
  
  
                                                    Last Documented On   
1 12:31PM ; [facility]  
  
  
  
                                Otitis externa  Medical Established Patient with  
 Doreen Deborah CNP 2021  
  
  
  
                                                    Last Documented On   
1 12:31PM ; [facility]  
  
  
  
                                                    Z68.42 - Body mass index [BM  
I]   
45.0-49.9, adult                        Medical Established Patient with   
Doreen Deborah CNP                        2021  
  
  
  
                                                    Last Documented On   
1 12:31PM ; [facility]  
  
  
  
                                        Post-traumatic stress disorder  Establ  
ished Patient with Leonie Short   
LISWS                                   06/10/2021  
  
  
  
                                                    Last Documented On 06/10/202  
1 10:05PM ; [facility]  
  
  
  
                                Morbid obesity  Medical Established Patient with  
 Doreen Deborah CNP 06/10/2021  
  
  
  
                                                    Last Documented On 06/10/202  
1 4:45PM ; [facility]  
  
  
  
                                                    Z68.42 - Body mass index [BM  
I]   
45.0-49.9, adult                        Medical Established Patient with   
Doreen Deborah CNP                        06/10/2021  
  
  
  
                                                    Last Documented On 06/10/202  
1 4:45PM ; [facility]  
  
  
  
                                        Post-traumatic stress disorder  Establ  
ished Patient with Leonie Short   
LISWS                                   2021  
  
  
  
                                                    Last Documented On   
1 11:59AM ; [facility]  
  
  
  
                                                    Assessment of visit for: scr  
eening for   
human immunodeficiency virus            Medical New Patient with Doreen Cabrera CNP                              2021  
  
  
  
                                                    Last Documented On   
1 4:06PM ; [facility]  
  
  
  
                                                    Diabetes Risk Test Score was  
 one score   
2021                               Medical New Patient with Doreen Cabrera CNP                              2021  
  
  
  
                                                    Last Documented On   
1 4:06PM ; [facility]  
  
  
  
                                Hypertension    Medical New Patient with Doreen esposito CNP 2021  
  
  
  
                                                    Last Documented On   
1 4:06PM ; [facility]  
  
  
  
                                Morbid obesity  Medical New Patient with Doreen esposito CNP 2021  
  
  
  
                                                    Last Documented On   
1 4:06PM ; [facility]  
  
  
  
                                Post-traumatic stress disorder Medical New Patie  
nt with Doreen Cabrera CNP   
2021  
  
  
  
                                                    Last Documented On   
1 4:06PM ; [facility]  
  
  
  
                                                    Z68.42 - Body mass index [BM  
I]   
45.0-49.9, adult                        Medical New Patient with Doreen Cabrera CNP                              2021  
  
  
  
                                                    Last Documented On   
1 4:06PM ; [facility]  
Work Phone: [phone]- History general Narrative - Reported  
  
Includes: Medical History in patient's chart  
  
  
  
                                        Description         Last Updated  
   
                                        History of gynecologic disorder endometr  
iosis 11/15/2024  
  
  
  
                                                    Last Documented On 11/15/202  
4 5:25PM ; [facility]  
  
  
  
                                        No Safety Measures  2024  
  
  
  
                                                    Last Documented On   
4 4:15PM ; [facility]  
  
  
  
                                        Consent form on file for procedure   
  
  
  
                                                    Last Documented On 10/09/202  
4 2:09PM ; [facility]  
  
  
  
                                        POTS                2024  
  
  
  
                                                    Last Documented On   
4 6:51PM ; [facility]  
  
  
  
                                        0 previous live birth(s) 2024  
  
  
  
                                                    Last Documented On   
4 7:50PM ; [facility]  
  
  
  
                                        Previously pregnant 1 time(s) 2024  
  
  
  
                                                    Last Documented On   
4 7:50PM ; [facility]  
  
  
  
                                        Recent immunization for flu 2024  
  
  
  
                                                    Last Documented On   
4 7:50PM ; [facility]  
  
  
  
                                        Has sex without a condom 2022  
  
  
  
                                                    Last Documented On   
2 4:04PM ; [facility]  
  
  
  
                                        Not planning to have a baby in the next   
12 months 2022  
  
  
  
                                                    Last Documented On   
2 4:04PM ; [facility]  
  
  
  
                                        Partners sexually transmitted infection   
status known 2022  
  
  
  
                                                    Last Documented On   
2 4:04PM ; [facility]  
  
  
  
                                        No previous hospitalizations 2022  
  
  
  
                                                    Last Documented On   
2 4:04PM ; [facility]  
  
  
  
                                        Chronic illness     2021  
  
  
  
                                                    Last Documented On   
1 7:49AM ; [facility]  
  
  
  
                                        Exposure to COVID-19 2021  
  
  
  
                                                    Last Documented On   
1 12:31PM ; [facility]  
  
  
  
                                        History of Polycystic Ovarian Syndrome (  
PCOS) 2021  
  
  
  
                                                    Last Documented On   
1 4:06PM ; [facility]  
  
  
  
                                        History of anxiety disorder NOS 20  
21  
  
  
  
                                                    Last Documented On   
1 4:06PM ; [facility]  
  
  
  
                                        History of migraine headache 2021  
  
  
  
                                                    Last Documented On   
1 4:06PM ; [facility]  
  
  
  
                                        History of psychiatric disorders biopola  
r disorder 2021  
  
  
  
                                                    Last Documented On   
1 4:06PM ; [facility]  
Work Phone: [phone]- Progress note*   
  
Progress note  
  
  
  
                                Date            Encounter       Last Documented   
by  
   
                                11/15/2024      Medical Established Patient Last  
 documented on 11/15/2024; 5:25 PM,   
Doreen Cabrera CNP; [facility]  
  
  
  
                                                      
  
  
** Active Problems & Conditions **  
- F90.9 - Attention-deficit Hyperactivity Disorder  
- F31.31 - Bipolar I Disorder, Most Recent Episode, Depressed Mild  
- E11.9 - Diabetes Mellitus Type 2 Without Complication  
- N94.6 - Dysmenorrhea  
- F43.10 - Post-traumatic Stress Disorder  
  
** Chief Complaint **  
The Chief Complaint is: One month med check. Patient reports seroquel is working
 to   
well. Would like to decrease dosage.  
  
** Referred Here **  
Not referred by urgent care clinic and not the emergency room. Prior encounters.  
- Data to be reviewed: no clinical lab tests  
  
** History of Present Illness **  
Linnette Beckham is a 24 year old female.  
- Allergy list reviewed - Reviewed Medications - Medication list reviewed  
- Date of last menstruation 2024 - Pregnancy test - would not like a 
pregnancy   
test  
Presents s/p procedure with dr patterson , feeling much better now and has repeat 
labs in   
2 weeks again , pre op on , wants seroquel decreased r/t sleeping 10-13 
hours  
  
** Current Medication **  
- Alcohol Pads 70% use to cleanse skin prior to checking blood sugars, 90 days, 
3   
refills  
- ARIPiprazole 5 MG Oral Tablet take 1 tablet by mouth once daily, 30 days, 5 
refills  
- Atorvastatin Calcium 20 MG Oral Tablet take 1 tablet by mouth once daily at   
bedtime, 30 days, 5 refills  
- Blood Glucose System Sriram Kit use to check sugars once daily (use what is 
covered),   
30 days, 0 refills  
- busPIRone HCl 10 MG Oral Tablet take 1 tablet by mouth twice daily (d/c 5 mg 
rx),   
30 days, 5 refills  
- CareSens Lancets Miscellaneous use to check sugars once daily (dispense what 
is   
covered), 90 days, 3 refills  
- Colace 100 MG Oral Capsule take 1 capsule by mouth once daily at bedtime as 
needed   
for constipation, 30 days, 5 refills  
- [employer] Iron 325 (65 Fe) MG Oral Tablet take 1 tablet by mouth once daily, 30 
days, 5   
refills  
- Intuniv 1 MG Oral Tablet Extended Release 24 Hour take 1 tablet by mouth once 
daily   
at bedtime, 30 days, 5 refills  
- Januvia 50 MG Oral Tablet take 1 tablet by mouth once daily, 30 days, 5 
refills  
- lamoTRIgine 100 MG Oral Tablet take 1 tablet by mouth once daily, 30 days, 5   
refills  
- Lisinopril 5 MG Oral Tablet take 1 tablet by mouth once daily, 30 days, 5 
refills  
- MiraLax 17 GM/SCOOP Oral Powder mix 1 scoop with 8 ounces once daily, 30 days,
 2   
refills  
- Narcan 4 MG/0.1ML Nasal Liquid spray in nostril for symptoms of overdose , may
   
repeat in 2 minutes if symptoms persist, 1 days, 0 refills  
- OneTouch Ultra In Vitro Strip USE ONE TEST STRIP TO CHECK BLOOD SUGARS ONCE 
DAILY,   
90 days, 3 refills  
- Prazosin HCl 1 MG Oral Capsule take 1 capsule by mouth twice daily, 30 days, 5
   
refills  
- Sertraline HCl 50 MG Oral Tablet take 1 tablet by mouth once daily, 30 days, 5
   
refills  
- Slynd 4 MG Oral Tablet take 1 tablet by mouth at the same time everyday to 
help   
regulate your period, 28 days, 2 refills  
  
** Past Medical/Surgical History **  
Reported:  
No Safety Measures. Has sex without a condom.  
Medical: No previous hospitalizations. Chronic illness and Sexually transmitted   
infection Partners sexually transmitted infection status known.  
Immunization History: Recent immunization for flu.  
Exposure: Exposure to COVID-19.  
Pregnancy: Previously pregnant 1 time(s) and para having 0 live birth(s). Not   
planning a pregnancy in the next year.  
Legal Documents: Consent form on file for procedure.  
Diagnoses:  
Gynecologic disorder endometriosis  
Polycystic Ovarian Syndrome (PCOS).  
Migraine headache.  
Psychiatric disorders biopolar disorder  
Anxiety disorder  
POTS.  
Procedural:  
- Insertion of ear pressure equalization tubes in both ears  
Surgical:  
- Tonsillectomy  
- Tonsillectomy with adenoidectomy  
- Dilation and curettage 10/25/24 Dr. Patterson  
  
** Social History **  
Environmental Exposure: Secondhand cigarette smoke exposure.  
Behavioral: Not a current tobacco user.  
Tobacco use: Using electronic cigarettes/vaping.  
Alcohol: Not using alcohol.  
Drug Use: Not using drugs denied by patient.  
Sexual: Sexually active, sexual orientation Lesbian or David, and gender identity   
Other. No report of birth control being practiced.  
  
** Allergies **  
- Abilify Reaction: groggy  
- Augmentin Reaction: Shock  
- Metformin Reaction: Nausea, Vomiting  
- NO KNOWN ENVIRONMENTAL ALLERGIES  
- NO KNOWN FOOD ALLERGIES  
- Sertraline Reaction: slow/groggy  
- Trazodone Hydrochloride Reaction: Panic attack  
  
** Family History **  
Paternal:  
Systemic hypertension  
Oncologic disorder  
Maternal:  
Systemic hypertension  
Epilepsy and recurrent seizures  
Psychiatric disorders  
Oncologic disorder  
Fraternal:  
Psychiatric disorders  
  
** Review Of Systems **  
Head: No head symptoms.  
Neck: No neck symptoms.  
Eyes: No eye symptoms.  
Otolaryngeal: No ear symptoms, no nasal symptoms, no nose and sinus finding, no   
throat symptoms, no oral cavity symptoms, and no jaw symptoms.  
Breasts: No breast symptoms.  
Cardiovascular: No cardiovascular symptoms and no chest pain or discomfort.  
Pulmonary: No pulmonary symptoms.  
Gastrointestinal: No gastrointestinal symptoms.  
Genitourinary: No genitourinary symptoms.  
Musculoskeletal: No musculoskeletal symptoms.  
Neurological: No neurological symptoms.  
Psychological: No psychological symptoms.  
Skin: No skin symptoms.  
  
** Physical Findings **  
- Vitals taken 11/15/2024 04:36 pm  
BP-Sitting R120/84 mmHg  
BP Cuff SizeLarge  
Pulse Rate-Bjedeig147 bpm  
Respiration Rate18 per min  
Temp-Oral98.3 F  
Bqwuwt74 in  
Lsynov529 lbs 6.4 oz  
Body Mass Index56.5 kg/m2  
Body Surface Area2.4 m2  
Oxygen Velvsoxsps69 %  
  
- Vitals taken 11/15/2024 04:58 pm  
BP-Sitting R149/83 mmHg  
BP Cuff SizeLarge  
  
Vital Signs:  
- Systolic blood pressure < 130 mmHg.  
- Diastolic blood pressure 80-89 mmHg.  
General Appearance:  
- Awake. - Alert. - Well developed. - Well nourished. - In no acute distress.  
Lungs:  
- Respiration rhythm and depth was normal. - Clear to auscultation.  
Cardiovascular:  
Heart Rate And Rhythm: - Normal.  
Heart Sounds: - Normal.  
Murmurs: - No murmurs were heard.  
Abdomen:  
Visual Inspection: - Abdomen was normal on visual inspection.  
Musculoskeletal System:  
General/bilateral: - Abnormal movement of all extremities.  
Skin:  
- General appearance of skin normal.  
  
** Tests **  
Blood Analysis:  
Hemoglobin Studies: ValueDate  
Blood hemoglobin A1c 7.4%11/15/2024  
Blood Endocrine Laboratory Tests: Value  
Blood glucose level by fingerstick Non-fasting 163 mg/dl  
Laboratory-based Chemistry:  
Urine Tests: Value  
Urine microalbumin dipstick test was 80 +  
Urine albumin/creatinine by test strip .  
Other Laboratory Tests:  
Screening for sexually transmitted infections was not performed.  
  
** Assessment **  
- Z68.43 - Body mass index [BMI] 50.0-59.9, adult  
- Z23 - Encounter for immunization  
- E11.9 - Type 2 diabetes mellitus without complications  
  
** Previous Tests **  
Laboratory Studies:  
Renal Function:  
Glomerular filtration rate 10/22/2024 >60.  
  
** Therapy **  
- Immunization administration, by injection, one vaccine.  
  
** Vaccinations **  
- HPV-Gardasil 9 Dose #2 Status: Administered Date: 11/15/2024  
  
- Received dose of human papilloma virus vaccine  
  
** Counseling/Education **  
- Does not want to stop using current contraception  
- Wishing to stop using electronic cigarettes/vaping  
- Discussed nutritional needs teach healthy choices including fruits and 
vegetables  
- Patient education about a proper diet  
- Not requesting contraception  
- Discussed concerns about exercise: promote physical activity  
  
** Plan **  
StartCited- Other  
Follow-up Appointment  
 for hpv #3  
EndCited  
Keep all upcoming appts , report to ER for any emergent symptoms.  
  
** Practice Management **  
Hemoglobin A1c level >=7.0 and < 8.0%.  
  
** Care Team **  
- Doreen Cabrera CNP  
  
** Health Reminders **  
- Assess BMI satisfied 11/15/2024.  
- Assess Tobacco Use satisfied 11/15/2024.  
- Follow Up Plan BMI Management satisfied 11/15/2024.  
- Hemoglobin A1c satisfied 11/15/2024.  
- Urine Protein Screening (microalbumin) satisfied 11/15/2024.  
  
** User Defined 1 **  
Not planning a pregnancy in the next year.  
  
  
[facility]- Evaluation note  
  
Includes: Assessments for all patient encounters  
  
  
  
                                Findings        Encounter       Date  
   
                                                    [D50.9 - Iron deficiency ane  
jennifer,   
unspecified] iron deficiency anemia Chart Update with Doreen Cabrera CNP   
10/07/2024  
  
  
  
                                                    Last Documented On 10/09/202  
4 2:06PM ; [facility]  
  
  
  
                                        Attention-deficit hyperactivity disorder  
  Established Patient with [facility]                               2024  
  
  
  
                                                    Last Documented On   
4 4:15PM ; [facility]  
  
  
  
                                                    Bipolar I disorder, most rec  
ent   
episode, depressed - mild                Established Patient with [facility]                               2024  
  
  
  
                                                    Last Documented On   
4 4:15PM ; [facility]  
  
  
  
                                Nicotine dependence  Established Patient with   
[facility] 2024  
  
  
  
                                                    Last Documented On   
4 4:15PM ; [facility]  
  
  
  
                                        Post-traumatic stress disorder  Establ  
ished Patient with Radha Hector   
[facility]                                     2024  
  
  
  
                                                    Last Documented On   
4 4:15PM ; [facility]  
  
  
  
                                                    [Z68.43 - Body mass index [B  
MI]   
50.0-59.9, adult] assessment of body   
mass index                              Medical Established Patient with   
Doreen Cabrera CNP                        2024  
  
  
  
                                                    Last Documented On 10/09/202  
4 2:09PM ; [facility]  
  
  
  
                                                    Bipolar I disorder, most rec  
ent   
episode, depressed - mild               Medical Established Patient with Doreen   
Deborah CNP                              2024  
  
  
  
                                                    Last Documented On 10/09/202  
4 2:09PM ; [facility]  
  
  
  
                                Caries          Medical Established Patient with  
 Doreen Deborah CNP 2024  
  
  
  
                                                    Last Documented On 10/09/202  
4 2:09PM ; [facility]  
  
  
  
                                        Encounter for Immunization Medical Estab  
lished Patient with Doreen Deborah   
CNP                                     2024  
  
  
  
                                                    Last Documented On 10/09/202  
4 2:09PM ; [facility]  
  
  
  
                                                    Type 2 diabetes mellitus wit  
hout   
complication                            Medical Established Patient with   
Doreen Deborah CNP                        2024  
  
  
  
                                                    Last Documented On 10/09/202  
4 2:09PM ; [facility]  
  
  
  
                                        Attention-deficit hyperactivity disorder  
  Established Patient with   
[facility]ie [facility]                     2024  
  
  
  
                                                    Last Documented On   
4 3:28PM ; [facility]  
  
  
  
                                                    Bipolar I disorder, most rec  
ent   
episode, depressed - mild               BH Established Patient with Leonie   
[facility]                             2024  
  
  
  
                                                    Last Documented On   
4 3:28PM ; [facility]  
  
  
  
                                        Post-traumatic stress disorder BH Establ  
ished Patient with Leonie Short   
[facility]                                   2024  
  
  
  
                                                    Last Documented On   
4 3:28PM ; [facility]  
  
  
  
                                                    [E11.9 - Type 2 diabetes lobito  
litus   
without complications] type 2 diabetes   
mellitus                                Medical Established Patient with   
Doreen Deborah CNP                        2024  
  
  
  
                                                    Last Documented On   
4 7:36PM ; [facility]  
  
  
  
                                                    [F41.1 - Generalized anxiety  
   
disorder] generalized anxiety   
disorder                                Medical Established Patient with   
Doreen Deborah CNP                        2024  
  
  
  
                                                    Last Documented On   
4 7:36PM ; [facility]  
  
  
  
                                                    [I10 - Essential (primary)   
hypertension] essential hypertension    Medical Established Patient with   
Doreen Deborah CNP                        2024  
  
  
  
                                                    Last Documented On   
4 7:36PM ; [facility]  
  
  
  
                                                    [N94.6 - Dysmenorrhea, unspe  
cified]   
dysmenorrhea                            Medical Established Patient with   
Doreen Deborah CNP                        2024  
  
  
  
                                                    Last Documented On   
4 7:36PM ; [facility]  
  
  
  
                                                    [Z68.43 - Body mass index [B  
MI]   
50.0-59.9, adult] assessment of body   
mass index                              Medical Established Patient with   
Doreen Cabrera CNP                        2024  
  
  
  
                                                    Last Documented On   
4 7:36PM ; [facility]  
  
  
  
                                        Encounter for Immunization Medical Estab  
lished Patient with Doreen Cabrera   
CNP                                     2024  
  
  
  
                                                    Last Documented On   
4 7:36PM ; [facility]  
  
  
  
                                        Venipuncture was performed Medical Estab  
lished Patient with Doreen Cabrera   
CNP                                     2024  
  
  
  
                                                    Last Documented On   
4 7:36PM ; [facility]  
  
  
  
                                        Attention-deficit hyperactivity disorder  
  Established Patient with   
[facility]ie [facility]                     2024  
  
  
  
                                                    Last Documented On   
4 10:10AM ; [facility]  
  
  
  
                                                    Bipolar I disorder, most rec  
ent   
episode, depressed - mild                Established Patient with Leonie   
[facility]                             2024  
  
  
  
                                                    Last Documented On   
4 10:10AM ; [facility]  
  
  
  
                                        Post-traumatic stress disorder  Establ  
ished Patient with Leonie Short   
[facility]                                   2024  
  
  
  
                                                    Last Documented On   
4 10:10AM ; [facility]  
  
  
  
                                        [D64.9 - Anemia, unspecified] anemia Med  
ical Established Patient with   
Doreen Cabrera CNP                        2024  
  
  
  
                                                    Last Documented On   
4 6:51PM ; [facility]  
  
  
  
                                                    [Z68.43 - Body mass index [B  
MI]   
50.0-59.9, adult] assessment of body   
mass index                              Medical Established Patient with   
Doreen Cabrera CNP                        2024  
  
  
  
                                                    Last Documented On   
4 6:51PM ; [facility]  
  
  
  
                                                    Bipolar affective disorder,   
current   
episode depressed, mild                  Established Patient with Leonie   
Short [facility]                             2024  
  
  
  
                                                    Last Documented On   
4 5:16PM ; [facility]  
  
  
  
                                        Post-traumatic stress disorder  Establ  
ished Patient with Leonie Short   
[facility]                                   2024  
  
  
  
                                                    Last Documented On   
4 5:16PM ; [facility]  
  
  
  
                                                    Undifferentiated attention d  
eficit   
disorder                                 Established Patient with   
[facility]ie [facility]                     2024  
  
  
  
                                                    Last Documented On   
4 5:16PM ; [facility]  
  
  
  
                                        Visit for: screening for disorder BH Est  
ablished Patient with Leonie Garrett [facility]                             2024  
  
  
  
                                                    Last Documented On   
4 5:16PM ; [facility]  
  
  
  
                                                    [Z68.43 - Body mass index [B  
MI]   
50.0-59.9, adult] assessment of body   
mass index                              Medical Established Patient with   
Doreen Cabrera CNP                        2024  
  
  
  
                                                    Last Documented On   
4 7:50PM ; [facility]  
  
  
  
                                        Attention-deficit hyperactivity disorder  
 Medical Established Patient with   
Doreen Cabrera CNP                        2024  
  
  
  
                                                    Last Documented On   
4 7:50PM ; [facility]  
  
  
  
                                                    Diabetes Risk Test Score was  
 three   
score 3/27/2024                         Medical Established Patient with Doreen Cabrera CNP                              2024  
  
  
  
                                                    Last Documented On   
4 7:50PM ; [facility]  
  
  
  
                                        Visit for: screening for STD Medical Est  
ablished Patient with Doreen Cabrera   
CNP                                     2024  
  
  
  
                                                    Last Documented On   
4 7:50PM ; [facility]  
  
  
  
                                                    [Z68.32 - Body mass index [B  
MI]   
32.0-32.9, adult] assessment of body   
mass index                              Medical Established Patient with   
Doreen Cabrera CNP                        2023  
  
  
  
                                                    Last Documented On   
3 6:01PM ; [facility]  
  
  
  
                                        Borderline personality disorder Medical   
Established Patient with Doreen Cabrera CNP                              2023  
  
  
  
                                                    Last Documented On   
3 6:01PM ; [facility]  
  
  
  
                                        Screening for diabetes mellitus Medical   
Established Patient with Doreen Cabrera CNP                              2023  
  
  
  
                                                    Last Documented On   
3 6:01PM ; [facility]  
  
  
  
                                        Assessment of body mass index Medical Es  
tablished Patient with Doreen Cabrera CNP                              10/21/2022  
  
  
  
                                                    Last Documented On 10/21/202  
2 2:45PM ; [facility]  
  
  
  
                                                    Bipolar affective disorder,   
current   
episode manic                            [facility] with Haylie Aguilar [facility]                        2022  
  
  
  
                                                    Last Documented On   
2 3:22PM ; [facility]  
  
  
  
                                                    Bipolar affective disorder,   
current   
episode manic                            Established Patient with Haylie Aguilar [facility]                        2022  
  
  
  
                                                    Last Documented On   
2 2:28PM ; [facility]  
  
  
  
                                                    Bipolar affective disorder,   
current   
episode depressed, severe with   
psychosis                                [facility] with Haylie Aguilar [facility]                        2022  
  
  
  
                                                    Last Documented On   
2 3:29PM ; [facility]  
  
  
  
                                                    Assessment of body mass inde  
x [Body   
mass index [BMI] 50.0-59.9, adult]      Open Access - Established with Julieth   
Aliya CNP                               2022  
  
  
  
                                                    Last Documented On   
2 9:36AM ; [facility]  
  
  
  
                                                    Bipolar I disorder, most rec  
ent   
episode, manic                          Open Access - Established with Julieth   
Aliya CNP                               2022  
  
  
  
                                                    Last Documented On   
2 9:36AM ; [facility]  
  
  
  
                                        Post-traumatic stress disorder  Establ  
ished Patient with Haylienicanor Aguilar   
[facility]                                  2022  
  
  
  
                                                    Last Documented On   
2 3:20PM ; [facility]  
  
  
  
                                No cough        Medical Established Patient with  
 Doreenpaola Mccormacken CNP 2022  
  
  
  
                                                    Last Documented On   
2 4:04PM ; [facility]  
  
  
  
                                                    Z68.43 - Body mass index [BM  
I]   
50.0-59.9, adult                        Medical Established Patient with   
Doreen Deborah CNP                        2022  
  
  
  
                                                    Last Documented On   
2 4:04PM ; [facility]  
  
  
  
                                                    Borderline personality disor  
danny Pt   
reported hx of sx/dx                     Established Patient with Haylie Aguilar [facility]                        2022  
  
  
  
                                                    Last Documented On   
2 4:08PM ; [facility]  
  
  
  
                                                    Assessment of body mass inde  
x [Body   
mass index [BMI] 50.0-59.9, adult]      Open Access - Established with Julieth   
Aliya CNP                               2022  
  
  
  
                                                    Last Documented On   
2 7:41PM ; [facility]  
  
  
  
                                                    Diabetes Risk Test Score was  
 three   
score 2022                         Open Access - Established with Julieth   
Aliya CNP                               2022  
  
  
  
                                                    Last Documented On   
2 7:41PM ; [facility]  
  
  
  
                                                    Bipolar I disorder, most rec  
ent   
episode, manic                           Established Patient with Avis Felder [facility]                          2021  
  
  
  
                                                    Last Documented On   
1 1:35AM ; [facility]  
  
  
  
                                        Borderline personality disorder BH Estab  
lished Patient with Avis   
Felder [facility]                          2021  
  
  
  
                                                    Last Documented On   
1 1:35AM ; [facility]  
  
  
  
                                        Post-traumatic stress disorder  Establ  
ished Patient with Avis   
Felder [facility]                          2021  
  
  
  
                                                    Last Documented On   
1 1:35AM ; [facility]  
  
  
  
                                                    Assessment of visit for: bhargavi myles for   
human immunodeficiency virus            Medical Established Patient with   
Doreen Deborah CNP                        2021  
  
  
  
                                                    Last Documented On   
1 2:56PM ; [facility]  
  
  
  
                                Nicotine dependence Medical Established Patient   
with Doreen Deborah CNP 2021  
  
  
  
                                                    Last Documented On   
1 2:56PM ; [facility]  
  
  
  
                                Tachycardia     Medical Established Patient with  
 Doreen Deborah CNP 2021  
  
  
  
                                                    Last Documented On   
1 2:56PM ; [facility]  
  
  
  
                                                    Z68.43 - Body mass index [BM  
I]   
50.0-59.9, adult                        Medical Established Patient with   
Doreen Deborah CNP                        2021  
  
  
  
                                                    Last Documented On   
1 2:56PM ; [facility]  
  
  
  
                                                    Bipolar I disorder, most rec  
ent   
episode, manic                           Established Patient with Leonie   
Short [facility]                             2021  
  
  
  
                                                    Last Documented On   
1 10:17AM ; [facility]  
  
  
  
                                                    Borderline personality disor  
danny per   
patient reported history                 Established Patient with Leonie   
Short [facility]                             2021  
  
  
  
                                                    Last Documented On   
1 10:17AM ; [facility]  
  
  
  
                                Nicotine dependence  Established Patient with   
Leonie Short [facility] 2021  
  
  
  
                                                    Last Documented On   
1 10:17AM ; [facility]  
  
  
  
                                        Post-traumatic stress disorder  Establ  
ished Patient with Leonie Short   
[facility]                                   2021  
  
  
  
                                                    Last Documented On   
1 10:17AM ; [facility]  
  
  
  
                                Body mass index Medical Established Patient with  
 Doreen Deborah CNP 2021  
  
  
  
                                                    Last Documented On   
1 5:23PM ; [facility]  
  
  
  
                                Morbid obesity  Medical Established Patient with  
 Doreen Deborah CNP 2021  
  
  
  
                                                    Last Documented On   
1 5:23PM ; [facility]  
  
  
  
                                        Nicotine dependence uncomplicated Medica  
l Established Patient with Doreen   
Deborah CNP                              2021  
  
  
  
                                                    Last Documented On   
1 5:23PM ; [facility]  
  
  
  
                                                    Z68.42 - Body mass index [BM  
I]   
45.0-49.9, adult                        Medical Established Patient with   
Doreen Deborah CNP                        2021  
  
  
  
                                                    Last Documented On   
1 5:23PM ; [facility]  
  
  
  
                                Episodic mood disorders On Call with Pamela Velezammy edwards CNP 2021  
  
  
  
                                                    Last Documented On   
1 7:49AM ; [facility]  
  
  
  
                                                    Mood disorders, NOS per tabatha  
ent   
reported history                         Established Patient with Leonie   
Short LISWS                             2021  
  
  
  
                                                    Last Documented On   
1 7:15PM ; [facility]  
  
  
  
                                        Post-traumatic stress disorder  Establ  
ished Patient with Leonie Short   
LISWS                                   2021  
  
  
  
                                                    Last Documented On   
1 7:15PM ; [facility]  
  
  
  
                                Morbid obesity  Medical Established Patient with  
 Doreen Deborah CNP 2021  
  
  
  
                                                    Last Documented On   
1 12:31PM ; [facility]  
  
  
  
                                Otitis externa  Medical Established Patient with  
 Doreen Deborah CNP 2021  
  
  
  
                                                    Last Documented On   
1 12:31PM ; [facility]  
  
  
  
                                                    Z68.42 - Body mass index [BM  
I]   
45.0-49.9, adult                        Medical Established Patient with   
Doreen Deborah CNP                        2021  
  
  
  
                                                    Last Documented On   
1 12:31PM ; [facility]  
  
  
  
                                        Post-traumatic stress disorder  Establ  
ished Patient with Leonie Short   
LISWS                                   06/10/2021  
  
  
  
                                                    Last Documented On 06/10/202  
1 10:05PM ; [facility]  
  
  
  
                                Morbid obesity  Medical Established Patient with  
 Doreen Deborah CNP 06/10/2021  
  
  
  
                                                    Last Documented On 06/10/202  
1 4:45PM ; [facility]  
  
  
  
                                                    Z68.42 - Body mass index [BM  
I]   
45.0-49.9, adult                        Medical Established Patient with   
Doreen Deborah CNP                        06/10/2021  
  
  
  
                                                    Last Documented On 06/10/202  
1 4:45PM ; [facility]  
  
  
  
                                        Post-traumatic stress disorder  Establ  
ished Patient with Leonie Short   
LISWS                                   2021  
  
  
  
                                                    Last Documented On   
1 11:59AM ; [facility]  
  
  
  
                                                    Assessment of visit for: bhargavi myles for   
human immunodeficiency virus            Medical New Patient with Doreen Cabrera CNP                              2021  
  
  
  
                                                    Last Documented On   
1 4:06PM ; [facility]  
  
  
  
                                                    Diabetes Risk Test Score was  
 one score   
2021                               Medical New Patient with Doreen Cabrera CNP                              2021  
  
  
  
                                                    Last Documented On   
1 4:06PM ; [facility]  
  
  
  
                                Hypertension    Medical New Patient with Doreen esposito CNP 2021  
  
  
  
                                                    Last Documented On   
1 4:06PM ; [facility]  
  
  
  
                                Morbid obesity  Medical New Patient with Doreen esposito CNP 2021  
  
  
  
                                                    Last Documented On   
1 4:06PM ; [facility]  
  
  
  
                                Post-traumatic stress disorder Medical New Patie  
nt with Doreen Cabrera CNP   
2021  
  
  
  
                                                    Last Documented On   
1 4:06PM ; [facility]  
  
  
  
                                                    Z68.42 - Body mass index [BM  
I]   
45.0-49.9, adult                        Medical New Patient with Doreen Cabrera CNP                              2021  
  
  
  
                                                    Last Documented On   
1 4:06PM ; [facility]  
Work Phone: [phone]- Evaluation note  
  
Includes: Assessments for all patient encounters  
  
  
  
                                Findings        Encounter       Date  
   
                                                    [D50.9 - Iron deficiency ane  
jennifer,   
unspecified] iron deficiency anemia Chart Update with Doreen Cabrera CNP   
10/07/2024  
  
  
  
                                                    Last Documented On 10/09/202  
4 2:06PM ; [facility]  
  
  
  
                                        Attention-deficit hyperactivity disorder  
  Established Patient with Radha Young LSW                               2024  
  
  
  
                                                    Last Documented On   
4 4:15PM ; [facility]  
  
  
  
                                                    Bipolar I disorder, most rec  
ent   
episode, depressed - mild                Established Patient with Radha Young LSW                               2024  
  
  
  
                                                    Last Documented On   
4 4:15PM ; [facility]  
  
  
  
                                Nicotine dependence  Established Patient with   
Radha Young LSW 2024  
  
  
  
                                                    Last Documented On   
4 4:15PM ; [facility]  
  
  
  
                                        Post-traumatic stress disorder BH Establ  
ished Patient with Radha Young   
LSW                                     2024  
  
  
  
                                                    Last Documented On   
4 4:15PM ; [facility]  
  
  
  
                                                    [Z68.43 - Body mass index [B  
MI]   
50.0-59.9, adult] assessment of body   
mass index                              Medical Established Patient with   
Doreenpaola Mccormacken CNP                        2024  
  
  
  
                                                    Last Documented On 10/04/202  
4 1:56PM ; [facility]  
  
  
  
                                                    Bipolar I disorder, most rec  
ent   
episode, depressed - mild               Medical Established Patient with Doreen   
Deborah CNP                              2024  
  
  
  
                                                    Last Documented On 10/04/202  
4 1:56PM ; [facility]  
  
  
  
                                Caries          Medical Established Patient with  
 Doreen Deborah CNP 2024  
  
  
  
                                                    Last Documented On 10/04/202  
4 1:56PM ; [facility]  
  
  
  
                                        Encounter for Immunization Medical Estab  
lished Patient with Doreen Deborah   
CNP                                     2024  
  
  
  
                                                    Last Documented On 10/04/202  
4 1:56PM ; [facility]  
  
  
  
                                                    Type 2 diabetes mellitus wit  
hout   
complication                            Medical Established Patient with   
Doreen Deborah CNP                        2024  
  
  
  
                                                    Last Documented On 10/04/202  
4 1:56PM ; [facility]  
  
  
  
                                        Attention-deficit hyperactivity disorder  
  Established Patient with   
Leonie Short LISWS                     2024  
  
  
  
                                                    Last Documented On   
4 3:28PM ; [facility]  
  
  
  
                                                    Bipolar I disorder, most rec  
ent   
episode, depressed - mild                Established Patient with Leonie   
Short LISWS                             2024  
  
  
  
                                                    Last Documented On   
4 3:28PM ; [facility]  
  
  
  
                                        Post-traumatic stress disorder  Establ  
ished Patient with Leonie Short   
LISWS                                   2024  
  
  
  
                                                    Last Documented On   
4 3:28PM ; [facility]  
  
  
  
                                                    [E11.9 - Type 2 diabetes lobito  
litus   
without complications] type 2 diabetes   
mellitus                                Medical Established Patient with   
Doreen Deborah CNP                        2024  
  
  
  
                                                    Last Documented On   
4 7:36PM ; [facility]  
  
  
  
                                                    [F41.1 - Generalized anxiety  
   
disorder] generalized anxiety   
disorder                                Medical Established Patient with   
Doreen Deborah CNP                        2024  
  
  
  
                                                    Last Documented On   
4 7:36PM ; [facility]  
  
  
  
                                                    [I10 - Essential (primary)   
hypertension] essential hypertension    Medical Established Patient with   
Doreen Cabrera CNP                        2024  
  
  
  
                                                    Last Documented On   
4 7:36PM ; [facility]  
  
  
  
                                                    [N94.6 - Dysmenorrhea, unspe  
cified]   
dysmenorrhea                            Medical Established Patient with   
Doreen Cabrera CNP                        2024  
  
  
  
                                                    Last Documented On   
4 7:36PM ; [facility]  
  
  
  
                                                    [Z68.43 - Body mass index [B  
MI]   
50.0-59.9, adult] assessment of body   
mass index                              Medical Established Patient with   
Doreen Cabrera CNP                        2024  
  
  
  
                                                    Last Documented On   
4 7:36PM ; [facility]  
  
  
  
                                        Encounter for Immunization Medical Estab  
lished Patient with Doreen Cabrera   
CNP                                     2024  
  
  
  
                                                    Last Documented On   
4 7:36PM ; [facility]  
  
  
  
                                        Venipuncture was performed Medical Estab  
lished Patient with Doreen Cabrera   
CNP                                     2024  
  
  
  
                                                    Last Documented On   
4 7:36PM ; [facility]  
  
  
  
                                        Attention-deficit hyperactivity disorder  
  Established Patient with   
Leonie Short LISWS                     2024  
  
  
  
                                                    Last Documented On   
4 10:10AM ; [facility]  
  
  
  
                                                    Bipolar I disorder, most rec  
ent   
episode, depressed - mild               BH Established Patient with Leonie   
Short LISWS                             2024  
  
  
  
                                                    Last Documented On   
4 10:10AM ; [facility]  
  
  
  
                                        Post-traumatic stress disorder BH Establ  
ished Patient with Leonie Short   
LISWS                                   2024  
  
  
  
                                                    Last Documented On   
4 10:10AM ; [facility]  
  
  
  
                                        [D64.9 - Anemia, unspecified] anemia Med  
ical Established Patient with   
Doreen Cabrera CNP                        2024  
  
  
  
                                                    Last Documented On   
4 6:51PM ; [facility]  
  
  
  
                                                    [Z68.43 - Body mass index [B  
MI]   
50.0-59.9, adult] assessment of body   
mass index                              Medical Established Patient with   
Doreen Cabrera CNP                        2024  
  
  
  
                                                    Last Documented On   
4 6:51PM ; [facility]  
  
  
  
                                                    Bipolar affective disorder,   
current   
episode depressed, mild                 BH Established Patient with Leonie Garrett LISWS                             2024  
  
  
  
                                                    Last Documented On   
4 5:16PM ; [facility]  
  
  
  
                                        Post-traumatic stress disorder BH Establ  
ished Patient with Leonie Short   
LISWS                                   2024  
  
  
  
                                                    Last Documented On   
4 5:16PM ; [facility]  
  
  
  
                                                    Undifferentiated attention d  
eficit   
disorder                                 Established Patient with   
Leonie Short LISWS                     2024  
  
  
  
                                                    Last Documented On   
4 5:16PM ; [facility]  
  
  
  
                                        Visit for: screening for disorder BH Est  
ablished Patient with Leonie Garrett LISWS                             2024  
  
  
  
                                                    Last Documented On   
4 5:16PM ; [facility]  
  
  
  
                                                    [Z68.43 - Body mass index [B  
MI]   
50.0-59.9, adult] assessment of body   
mass index                              Medical Established Patient with   
Doreen Cabrera CNP                        2024  
  
  
  
                                                    Last Documented On   
4 7:50PM ; [facility]  
  
  
  
                                        Attention-deficit hyperactivity disorder  
 Medical Established Patient with   
Doreen Cabrera CNP                        2024  
  
  
  
                                                    Last Documented On   
4 7:50PM ; [facility]  
  
  
  
                                                    Diabetes Risk Test Score was  
 three   
score 3/27/2024                         Medical Established Patient with Doreen Cabrera CNP                              2024  
  
  
  
                                                    Last Documented On   
4 7:50PM ; [facility]  
  
  
  
                                        Visit for: screening for STD Medical Est  
ablished Patient with Doreen Cabrera   
CNP                                     2024  
  
  
  
                                                    Last Documented On   
4 7:50PM ; [facility]  
  
  
  
                                                    [Z68.32 - Body mass index [B  
MI]   
32.0-32.9, adult] assessment of body   
mass index                              Medical Established Patient with   
Doreen Cabrera CNP                        2023  
  
  
  
                                                    Last Documented On   
3 6:01PM ; [facility]  
  
  
  
                                        Borderline personality disorder Medical   
Established Patient with Doreen Cabrera CNP                              2023  
  
  
  
                                                    Last Documented On   
3 6:01PM ; [facility]  
  
  
  
                                        Screening for diabetes mellitus Medical   
Established Patient with Doreen Cabrera CNP                              2023  
  
  
  
                                                    Last Documented On   
3 6:01PM ; [facility]  
  
  
  
                                        Assessment of body mass index Medical Es  
tablished Patient with Doreenpaola Cabrera CNP                              10/21/2022  
  
  
  
                                                    Last Documented On 10/21/202  
2 2:45PM ; [facility]  
  
  
  
                                                    Bipolar affective disorder,   
current   
episode manic                            [facility] with Haylienicanor Aguilar LPCC-S                        2022  
  
  
  
                                                    Last Documented On   
2 3:22PM ; [facility]  
  
  
  
                                                    Bipolar affective disorder,   
current   
episode manic                           BH Established Patient with Haylie   
Aguilar LPCC-S                        2022  
  
  
  
                                                    Last Documented On   
2 2:28PM ; [facility]  
  
  
  
                                                    Bipolar affective disorder,   
current   
episode depressed, severe with   
psychosis                                [facility] with Haylienicanor Aguilar LPCC-S                        2022  
  
  
  
                                                    Last Documented On   
2 3:29PM ; [facility]  
  
  
  
                                                    Assessment of body mass inde  
x [Body   
mass index [BMI] 50.0-59.9, adult]      Open [facility] - Established with Julieth Pisano CNP                               2022  
  
  
  
                                                    Last Documented On   
2 9:36AM ; [facility]  
  
  
  
                                                    Bipolar I disorder, most rec  
ent   
episode, manic                          Open [facility] - Established with Julieth   
Aliya CNP                               2022  
  
  
  
                                                    Last Documented On   
2 9:36AM ; [facility]  
  
  
  
                                        Post-traumatic stress disorder  Establ  
ished Patient with Haylienicanor Aguilar   
LPCC-S                                  2022  
  
  
  
                                                    Last Documented On   
2 3:20PM ; [facility]  
  
  
  
                                No cough        Medical Established Patient with  
 Doreenpaola Cabrera CNP 2022  
  
  
  
                                                    Last Documented On   
2 4:04PM ; [facility]  
  
  
  
                                                    Z68.43 - Body mass index [BM  
I]   
50.0-59.9, adult                        Medical Established Patient with   
Doreen Deborah CNP                        2022  
  
  
  
                                                    Last Documented On   
2 4:04PM ; [facility]  
  
  
  
                                                    Borderline personality disor  
danny Pt   
reported hx of sx/dx                    BH Established Patient with Haylienicanor Aguilar LPCC-S                        2022  
  
  
  
                                                    Last Documented On   
2 4:08PM ; [facility]  
  
  
  
                                                    Assessment of body mass inde  
x [Body   
mass index [BMI] 50.0-59.9, adult]      [facility] - Established with Julieth Menaer CNP                               2022  
  
  
  
                                                    Last Documented On   
2 7:41PM ; [facility]  
  
  
  
                                                    Diabetes Risk Test Score was  
 three   
score 2022                         Open Access - Established with Julieth   
Aliya CNP                               2022  
  
  
  
                                                    Last Documented On   
2 7:41PM ; [facility]  
  
  
  
                                                    Bipolar I disorder, most rec  
ent   
episode, manic                           Established Patient with Avis   
Felder LPCC-S                          2021  
  
  
  
                                                    Last Documented On   
1 1:35AM ; [facility]  
  
  
  
                                        Borderline personality disorder  Estab  
lished Patient with Avis   
Felder LPCC-S                          2021  
  
  
  
                                                    Last Documented On   
1 1:35AM ; [facility]  
  
  
  
                                        Post-traumatic stress disorder  Establ  
ished Patient with Avis   
Felder LPCC-S                          2021  
  
  
  
                                                    Last Documented On   
1 1:35AM ; [facility]  
  
  
  
                                                    Assessment of visit for: bhargavi myles for   
human immunodeficiency virus            Medical Established Patient with   
Doreen Deborah [facility]                        2021  
  
  
  
                                                    Last Documented On   
1 2:56PM ; [facility]  
  
  
  
                                Nicotine dependence Medical Established Patient   
with Doreen Deborah CNP 2021  
  
  
  
                                                    Last Documented On   
1 2:56PM ; [facility]  
  
  
  
                                Tachycardia     Medical Established Patient with  
 Doreen Deborah CNP 2021  
  
  
  
                                                    Last Documented On   
1 2:56PM ; [facility]  
  
  
  
                                                    Z68.43 - Body mass index [BM  
I]   
50.0-59.9, adult                        Medical Established Patient with   
Doreen Deborah CNP                        2021  
  
  
  
                                                    Last Documented On   
1 2:56PM ; [facility]  
  
  
  
                                                    Bipolar I disorder, most rec  
ent   
episode, manic                           Established Patient with Leonie   
Short LISWS                             2021  
  
  
  
                                                    Last Documented On   
1 10:17AM ; [facility]  
  
  
  
                                                    Borderline personality disor  
danny per   
patient reported history                 Established Patient with Leonie   
Short LISWS                             2021  
  
  
  
                                                    Last Documented On   
1 10:17AM ; [facility]  
  
  
  
                                Nicotine dependence  Established Patient with   
Leonie Short LISWS 2021  
  
  
  
                                                    Last Documented On   
1 10:17AM ; [facility]  
  
  
  
                                        Post-traumatic stress disorder  Establ  
ished Patient with Leonie Short   
LISWS                                   2021  
  
  
  
                                                    Last Documented On   
1 10:17AM ; [facility]  
  
  
  
                                Body mass index Medical Established Patient with  
 Doreen Deborah CNP 2021  
  
  
  
                                                    Last Documented On   
1 5:23PM ; [facility]  
  
  
  
                                Morbid obesity  Medical Established Patient with  
 Doreen Deborah CNP 2021  
  
  
  
                                                    Last Documented On   
1 5:23PM ; [facility]  
  
  
  
                                        Nicotine dependence uncomplicated Medica  
l Established Patient with Doreen   
Deborah CNP                              2021  
  
  
  
                                                    Last Documented On   
1 5:23PM ; [facility]  
  
  
  
                                                    Z68.42 - Body mass index [BM  
I]   
45.0-49.9, adult                        Medical Established Patient with   
Doreen Deborah CNP                        2021  
  
  
  
                                                    Last Documented On   
1 5:23PM ; [facility]  
  
  
  
                                Episodic mood disorders On Call with Pamela edwards CNP 2021  
  
  
  
                                                    Last Documented On   
1 7:49AM ; [facility]  
  
  
  
                                                    Mood disorders, NOS per tabatha  
ent   
reported history                         Established Patient with Leonie   
Short LISWS                             2021  
  
  
  
                                                    Last Documented On   
1 7:15PM ; [facility]  
  
  
  
                                        Post-traumatic stress disorder  Establ  
ished Patient with Leonie Short   
LISWS                                   2021  
  
  
  
                                                    Last Documented On   
1 7:15PM ; [facility]  
  
  
  
                                Morbid obesity  Medical Established Patient with  
 Doreen Deborah CNP 2021  
  
  
  
                                                    Last Documented On   
1 12:31PM ; [facility]  
  
  
  
                                Otitis externa  Medical Established Patient with  
 Doreen Deborah CNP 2021  
  
  
  
                                                    Last Documented On   
1 12:31PM ; [facility]  
  
  
  
                                                    Z68.42 - Body mass index [BM  
I]   
45.0-49.9, adult                        Medical Established Patient with   
Doreen Deborah CNP                        2021  
  
  
  
                                                    Last Documented On   
1 12:31PM ; [facility]  
  
  
  
                                        Post-traumatic stress disorder  Establ  
ished Patient with Leonie Short   
LISWS                                   06/10/2021  
  
  
  
                                                    Last Documented On 06/10/202  
1 10:05PM ; [facility]  
  
  
  
                                Morbid obesity  Medical Established Patient with  
 Doreenpaola Cabrera CNP 06/10/2021  
  
  
  
                                                    Last Documented On 06/10/202  
1 4:45PM ; [facility]  
  
  
  
                                                    Z68.42 - Body mass index [BM  
I]   
45.0-49.9, adult                        Medical Established Patient with   
Doreen Deborah CNP                        06/10/2021  
  
  
  
                                                    Last Documented On 06/10/202  
1 4:45PM ; [facility]  
  
  
  
                                        Post-traumatic stress disorder BH Establ  
ished Patient with Leonie Short   
LISWS                                   2021  
  
  
  
                                                    Last Documented On   
1 11:59AM ; [facility]  
  
  
  
                                                    Assessment of visit for: bhargavi myles for   
human immunodeficiency virus            Medical New Patient with Doreen   
Deborah CNP                              2021  
  
  
  
                                                    Last Documented On   
1 4:06PM ; [facility]  
  
  
  
                                                    Diabetes Risk Test Score was  
 one score   
2021                               Medical New Patient with Doreen   
Deborah CNP                              2021  
  
  
  
                                                    Last Documented On   
1 4:06PM ; [facility]  
  
  
  
                                Hypertension    Medical New Patient with Doreen C  
cate CNP 2021  
  
  
  
                                                    Last Documented On   
1 4:06PM ; [facility]  
  
  
  
                                Morbid obesity  Medical New Patient with Doreen C  
cate CNP 2021  
  
  
  
                                                    Last Documented On   
1 4:06PM ; [facility]  
  
  
  
                                Post-traumatic stress disorder Medical New Patie  
nt with Doreen Deborah CNP   
2021  
  
  
  
                                                    Last Documented On   
1 4:06PM ; [facility]  
  
  
  
                                                    Z68.42 - Body mass index [BM  
I]   
45.0-49.9, adult                        Medical New Patient with Doreen   
Deborah CNP                              2021  
  
  
  
                                                    Last Documented On   
1 4:06PM ; [facility]  
Work Phone: [phone]- Progress note*   
  
Progress note  
  
  
  
                                Date            Encounter       Last Documented   
by  
   
                                10/07/2024      Chart Update    Last documented   
on 10/09/2024; 2:06 PM, Doreen Cabrera CNP;   
[facility]  
  
  
  
                                                      
  
  
** Active Problems & Conditions **  
- F90.9 - Attention-deficit Hyperactivity Disorder  
- F31.31 - Bipolar I Disorder, Most Recent Episode, Depressed Mild  
- E11.9 - Diabetes Mellitus Type 2 Without Complication  
- N94.6 - Dysmenorrhea  
- F43.10 - Post-traumatic Stress Disorder  
  
** Current Medication **  
- Alcohol Pads 70% use to cleanse skin prior to checking blood sugars, 90 days, 
3   
refills  
- ARIPiprazole 5 MG Oral Tablet take 1 tablet by mouth once daily, 30 days, 5 
refills  
- Atorvastatin Calcium 20 MG Oral Tablet take 1 tablet by mouth once daily at   
bedtime, 30 days, 5 refills  
- Blood Glucose System Sriram Kit use to check sugars once daily (use what is 
covered),   
30 days, 0 refills  
- busPIRone HCl 10 MG Oral Tablet take 1 tablet by mouth twice daily (d/c 5 mg 
rx),   
30 days, 5 refills  
- CareSens Lancets Miscellaneous use to check sugars once daily (dispense what 
is   
covered), 90 days, 3 refills  
- Colace 100 MG Oral Capsule take 1 capsule by mouth once daily at bedtime as 
needed   
for constipation, 30 days, 5 refills  
- [employer] Iron 325 (65 Fe) MG Oral Tablet take 1 tablet by mouth once daily, 30 
days, 5   
refills  
- Intuniv 1 MG Oral Tablet Extended Release 24 Hour take 1 tablet by mouth once 
daily   
at bedtime, 30 days, 5 refills  
- Januvia 50 MG Oral Tablet take 1 tablet by mouth once daily, 30 days, 5 
refills  
- lamoTRIgine 100 MG Oral Tablet take 1 tablet by mouth once daily, 30 days, 5   
refills  
- Lisinopril 5 MG Oral Tablet take 1 tablet by mouth once daily, 30 days, 5 
refills  
- MiraLax 17 GM/SCOOP Oral Powder mix 1 scoop with 8 ounces once daily, 30 days,
 2   
refills  
- Narcan 4 MG/0.1ML Nasal Liquid spray in nostril for symptoms of overdose , may
   
repeat in 2 minutes if symptoms persist, 1 days, 0 refills  
- OneTouch Ultra In Vitro Strip USE ONE TEST STRIP TO CHECK BLOOD SUGARS ONCE 
DAILY,   
90 days, 3 refills  
- Prazosin HCl 1 MG Oral Capsule take 1 capsule by mouth twice daily, 30 days, 5
   
refills  
- SEROquel 50 MG Oral Tablet take 1 tablet by mouth once daily at bedtime (d/c   
trazodone), 30 days, 1 refills  
- Sertraline HCl 50 MG Oral Tablet take 1 tablet by mouth once daily, 30 days, 5
   
refills  
- Slynd 4 MG Oral Tablet take 1 tablet by mouth at the same time everyday to 
help   
regulate your period, 28 days, 2 refills  
  
** Past Medical/Surgical History **  
Reported:  
No Safety Measures. Has sex without a condom.  
Medical: No previous hospitalizations. Chronic illness and Sexually transmitted   
infection Partners sexually transmitted infection status known.  
Immunization History: Recent immunization for flu.  
Exposure: Exposure to COVID-19.  
Pregnancy: Previously pregnant 1 time(s) and para having 0 live birth(s). Not   
planning a pregnancy in the next year.  
Legal Documents: Consent form on file for procedure.  
Diagnoses:  
Polycystic Ovarian Syndrome (PCOS).  
Migraine headache.  
Psychiatric disorders biopolar disorder  
Anxiety disorder  
POTS.  
Procedural:  
- Insertion of ear pressure equalization tubes in both ears  
Surgical:  
- Tonsillectomy  
- Tonsillectomy with adenoidectomy  
  
** Allergies **  
- Abilify Reaction: groggy  
- Augmentin Reaction: Shock  
- Metformin Reaction: Nausea, Vomiting  
- NO KNOWN ENVIRONMENTAL ALLERGIES  
- NO KNOWN FOOD ALLERGIES  
- Sertraline Reaction: slow/groggy  
- Trazodone Hydrochloride Reaction: Panic attack  
  
** Family History **  
Paternal:  
Systemic hypertension  
Oncologic disorder  
Maternal:  
Systemic hypertension  
Epilepsy and recurrent seizures  
Psychiatric disorders  
Oncologic disorder  
Fraternal:  
Psychiatric disorders  
  
** Assessment **  
- D50.9 - Iron deficiency anemia, unspecified  
  
** Plan **  
StartCited- Iron deficiency anemia, unspecified  
Lab: Iron and TIBC  
Lab: CBC With Differential/Platelet  
EndCited  
** Care Team **  
- Doreen Cabrera CNP  
  
  
[facility]- Progress note*   
  
Progress note  
  
  
  
                                Date            Encounter       Last Documented   
by  
   
                                10/01/2024      Chart Update    Last documented   
on 10/07/2024; 12:06 PM, Doreen Cabrera CNP;   
[facility]  
  
  
  
                                                      
  
  
** Active Problems & Conditions **  
- F90.9 - Attention-deficit Hyperactivity Disorder  
- F31.31 - Bipolar I Disorder, Most Recent Episode, Depressed Mild  
- E11.9 - Diabetes Mellitus Type 2 Without Complication  
- N94.6 - Dysmenorrhea  
- F43.10 - Post-traumatic Stress Disorder  
  
** Current Medication **  
- Alcohol Pads 70% use to cleanse skin prior to checking blood sugars, 90 days, 
3   
refills  
- ARIPiprazole 5 MG Oral Tablet take 1 tablet by mouth once daily, 30 days, 5 
refills  
- Atorvastatin Calcium 20 MG Oral Tablet take 1 tablet by mouth once daily at   
bedtime, 30 days, 5 refills  
- Blood Glucose System Sriram Kit use to check sugars once daily (use what is 
covered),   
30 days, 0 refills  
- busPIRone HCl 10 MG Oral Tablet take 1 tablet by mouth twice daily (d/c 5 mg 
rx),   
30 days, 5 refills  
- CareSens Lancets Miscellaneous use to check sugars once daily (dispense what 
is   
covered), 90 days, 3 refills  
- Colace 100 MG Oral Capsule take 1 capsule by mouth once daily at bedtime as 
needed   
for constipation, 30 days, 5 refills  
- CVS Iron 325 (65 Fe) MG Oral Tablet take 1 tablet by mouth once daily, 30 
days, 5   
refills  
- Intuniv 1 MG Oral Tablet Extended Release 24 Hour take 1 tablet by mouth once 
daily   
at bedtime, 30 days, 5 refills  
- Januvia 50 MG Oral Tablet take 1 tablet by mouth once daily, 30 days, 5 
refills  
- lamoTRIgine 100 MG Oral Tablet take 1 tablet by mouth once daily, 30 days, 5   
refills  
- Lisinopril 5 MG Oral Tablet take 1 tablet by mouth once daily, 30 days, 5 
refills  
- MiraLax 17 GM/SCOOP Oral Powder mix 1 scoop with 8 ounces once daily, 30 days,
 2   
refills  
- Narcan 4 MG/0.1ML Nasal Liquid spray in nostril for symptoms of overdose , may
   
repeat in 2 minutes if symptoms persist, 1 days, 0 refills  
- OneTouch Ultra In Vitro Strip USE ONE TEST STRIP TO CHECK BLOOD SUGARS ONCE 
DAILY,   
90 days, 3 refills  
- Prazosin HCl 1 MG Oral Capsule take 1 capsule by mouth twice daily, 30 days, 5
   
refills  
- SEROquel 50 MG Oral Tablet take 1 tablet by mouth once daily at bedtime (d/c   
trazodone), 30 days, 1 refills  
- Sertraline HCl 50 MG Oral Tablet take 1 tablet by mouth once daily, 30 days, 5
   
refills  
- Slynd 4 MG Oral Tablet take 1 tablet by mouth at the same time everyday to 
help   
regulate your period, 28 days, 2 refills  
  
** Past Medical/Surgical History **  
Reported:  
No Safety Measures. Has sex without a condom.  
Medical: No previous hospitalizations. Chronic illness and Sexually transmitted   
infection Partners sexually transmitted infection status known.  
Immunization History: Recent immunization for flu.  
Exposure: Exposure to COVID-19.  
Pregnancy: Previously pregnant 1 time(s) and para having 0 live birth(s). Not   
planning a pregnancy in the next year.  
Legal Documents: Consent form on file for procedure.  
Diagnoses:  
Polycystic Ovarian Syndrome (PCOS).  
Migraine headache.  
Psychiatric disorders biopolar disorder  
Anxiety disorder  
POTS.  
Procedural:  
- Insertion of ear pressure equalization tubes in both ears  
Surgical:  
- Tonsillectomy  
- Tonsillectomy with adenoidectomy  
  
** Allergies **  
- Abilify Reaction: groggy  
- Augmentin Reaction: Shock  
- Metformin Reaction: Nausea, Vomiting  
- NO KNOWN ENVIRONMENTAL ALLERGIES  
- NO KNOWN FOOD ALLERGIES  
- Sertraline Reaction: slow/groggy  
- Trazodone Hydrochloride Reaction: Panic attack  
  
** Family History **  
Paternal:  
Systemic hypertension  
Oncologic disorder  
Maternal:  
Systemic hypertension  
Epilepsy and recurrent seizures  
Psychiatric disorders  
Oncologic disorder  
Fraternal:  
Psychiatric disorders  
  
** Care Team **  
- Doreen Cabrera CNP  
  
  
[facility]- History general Narrative - Reported  
  
Includes: Medical History in patient's chart  
  
  
  
                                        Description         Last Updated  
   
                                        No Safety Measures  2024  
  
  
  
                                                    Last Documented On   
4 4:15PM ; [facility]  
  
  
  
                                        POTS                2024  
  
  
  
                                                    Last Documented On   
4 6:51PM ; [facility]  
  
  
  
                                        0 previous live birth(s) 2024  
  
  
  
                                                    Last Documented On   
4 7:50PM ; [facility]  
  
  
  
                                        Previously pregnant 1 time(s) 2024  
  
  
  
                                                    Last Documented On   
4 7:50PM ; [facility]  
  
  
  
                                        Recent immunization for flu 2024  
  
  
  
                                                    Last Documented On   
4 7:50PM ; [facility]  
  
  
  
                                        Has sex without a condom 2022  
  
  
  
                                                    Last Documented On   
2 4:04PM ; [facility]  
  
  
  
                                        Not planning to have a baby in the next   
12 months 2022  
  
  
  
                                                    Last Documented On   
2 4:04PM ; [facility]  
  
  
  
                                        Partners sexually transmitted infection   
status known 2022  
  
  
  
                                                    Last Documented On   
2 4:04PM ; [facility]  
  
  
  
                                        No previous hospitalizations 2022  
  
  
  
                                                    Last Documented On   
2 4:04PM ; [facility]  
  
  
  
                                        Chronic illness     2021  
  
  
  
                                                    Last Documented On   
1 7:49AM ; [facility]  
  
  
  
                                        Exposure to COVID-19 2021  
  
  
  
                                                    Last Documented On   
1 12:31PM ; [facility]  
  
  
  
                                        History of gynecologic disorder 20  
21  
  
  
  
                                                    Last Documented On   
1 4:06PM ; [facility]  
  
  
  
                                        History of Polycystic Ovarian Syndrome (  
PCOS) 2021  
  
  
  
                                                    Last Documented On   
1 4:06PM ; [facility]  
  
  
  
                                        History of anxiety disorder NOS 20  
21  
  
  
  
                                                    Last Documented On   
1 4:06PM ; [facility]  
  
  
  
                                        History of migraine headache 2021  
  
  
  
                                                    Last Documented On   
1 4:06PM ; [facility]  
  
  
  
                                        History of psychiatric disorders biopola  
r disorder 2021  
  
  
  
                                                    Last Documented On   
1 4:06PM ; [facility]  
Work Phone: [phone]- History general Narrative - Reported  
  
Includes: Medical History in patient's chart  
  
  
  
                                        Description         Last Updated  
   
                                        No Safety Measures  2024  
  
  
  
                                                    Last Documented On   
4 4:15PM ; [facility]  
  
  
  
                                        Consent form on file for procedure   
  
  
  
                                                    Last Documented On 10/04/202  
4 1:56PM ; [facility]  
  
  
  
                                        POTS                2024  
  
  
  
                                                    Last Documented On   
4 6:51PM ; [facility]  
  
  
  
                                        0 previous live birth(s) 2024  
  
  
  
                                                    Last Documented On   
4 7:50PM ; [facility]  
  
  
  
                                        Previously pregnant 1 time(s) 2024  
  
  
  
                                                    Last Documented On   
4 7:50PM ; [facility]  
  
  
  
                                        Recent immunization for flu 2024  
  
  
  
                                                    Last Documented On   
4 7:50PM ; [facility]  
  
  
  
                                        Has sex without a condom 2022  
  
  
  
                                                    Last Documented On   
2 4:04PM ; [facility]  
  
  
  
                                        Not planning to have a baby in the next   
12 months 2022  
  
  
  
                                                    Last Documented On   
2 4:04PM ; [facility]  
  
  
  
                                        Partners sexually transmitted infection   
status known 2022  
  
  
  
                                                    Last Documented On   
2 4:04PM ; [facility]  
  
  
  
                                        No previous hospitalizations 2022  
  
  
  
                                                    Last Documented On   
2 4:04PM ; [facility]  
  
  
  
                                        Chronic illness     2021  
  
  
  
                                                    Last Documented On   
1 7:49AM ; [facility]  
  
  
  
                                        Exposure to COVID-19 2021  
  
  
  
                                                    Last Documented On   
1 12:31PM ; [facility]  
  
  
  
                                        History of gynecologic disorder 20  
21  
  
  
  
                                                    Last Documented On   
1 4:06PM ; [facility]  
  
  
  
                                        History of Polycystic Ovarian Syndrome (  
PCOS) 2021  
  
  
  
                                                    Last Documented On   
1 4:06PM ; [facility]  
  
  
  
                                        History of anxiety disorder NOS 20  
21  
  
  
  
                                                    Last Documented On   
1 4:06PM ; [facility]  
  
  
  
                                        History of migraine headache 2021  
  
  
  
                                                    Last Documented On   
1 4:06PM ; [facility]  
  
  
  
                                        History of psychiatric disorders biopola  
r disorder 2021  
  
  
  
                                                    Last Documented On   
1 4:06PM ; [facility]  
Work Phone: [phone]- History general Narrative - Reported  
  
Includes: Medical History in patient's chart  
  
  
  
                                        Description         Last Updated  
   
                                        No Safety Measures  2024  
  
  
  
                                                    Last Documented On   
4 4:15PM ; [facility]  
  
  
  
                                        Consent form on file for procedure   
  
  
  
                                                    Last Documented On 10/04/202  
4 1:56PM ; [facility]  
  
  
  
                                        POTS                2024  
  
  
  
                                                    Last Documented On   
4 6:51PM ; [facility]  
  
  
  
                                        0 previous live birth(s) 2024  
  
  
  
                                                    Last Documented On   
4 7:50PM ; [facility]  
  
  
  
                                        Previously pregnant 1 time(s) 2024  
  
  
  
                                                    Last Documented On   
4 7:50PM ; [facility]  
  
  
  
                                        Recent immunization for flu 2024  
  
  
  
                                                    Last Documented On   
4 7:50PM ; [facility]  
  
  
  
                                        Has sex without a condom 2022  
  
  
  
                                                    Last Documented On   
2 4:04PM ; [facility]  
  
  
  
                                        Not planning to have a baby in the next   
12 months 2022  
  
  
  
                                                    Last Documented On   
2 4:04PM ; [facility]  
  
  
  
                                        Partners sexually transmitted infection   
status known 2022  
  
  
  
                                                    Last Documented On   
2 4:04PM ; [facility]  
  
  
  
                                        No previous hospitalizations 2022  
  
  
  
                                                    Last Documented On   
2 4:04PM ; [facility]  
  
  
  
                                        Chronic illness     2021  
  
  
  
                                                    Last Documented On   
1 7:49AM ; [facility]  
  
  
  
                                        Exposure to COVID-19 2021  
  
  
  
                                                    Last Documented On   
1 12:31PM ; [facility]  
  
  
  
                                        History of gynecologic disorder 20  
21  
  
  
  
                                                    Last Documented On   
1 4:06PM ; [facility]  
  
  
  
                                        History of Polycystic Ovarian Syndrome (  
PCOS) 2021  
  
  
  
                                                    Last Documented On   
1 4:06PM ; [facility]  
  
  
  
                                        History of anxiety disorder NOS 20  
21  
  
  
  
                                                    Last Documented On   
1 4:06PM ; [facility]  
  
  
  
                                        History of migraine headache 2021  
  
  
  
                                                    Last Documented On   
1 4:06PM ; [facility]  
  
  
  
                                        History of psychiatric disorders biopola  
r disorder 2021  
  
  
  
                                                    Last Documented On   
1 4:06PM ; [facility]  
Work Phone: [phone]- History general Narrative - Reported  
  
Includes: Medical History in patient's chart  
  
  
  
                                        Description         Last Updated  
   
                                        No Safety Measures  2024  
  
  
  
                                                    Last Documented On   
4 4:15PM ; [facility]  
  
  
  
                                        Consent form on file for procedure   
  
  
  
                                                    Last Documented On 10/09/202  
4 2:09PM ; [facility]  
  
  
  
                                        POTS                2024  
  
  
  
                                                    Last Documented On   
4 6:51PM ; [facility]  
  
  
  
                                        0 previous live birth(s) 2024  
  
  
  
                                                    Last Documented On   
4 7:50PM ; [facility]  
  
  
  
                                        Previously pregnant 1 time(s) 2024  
  
  
  
                                                    Last Documented On   
4 7:50PM ; [facility]  
  
  
  
                                        Recent immunization for flu 2024  
  
  
  
                                                    Last Documented On   
4 7:50PM ; [facility]  
  
  
  
                                        Has sex without a condom 2022  
  
  
  
                                                    Last Documented On   
2 4:04PM ; [facility]  
  
  
  
                                        Not planning to have a baby in the next   
12 months 2022  
  
  
  
                                                    Last Documented On   
2 4:04PM ; [facility]  
  
  
  
                                        Partners sexually transmitted infection   
status known 2022  
  
  
  
                                                    Last Documented On   
2 4:04PM ; [facility]  
  
  
  
                                        No previous hospitalizations 2022  
  
  
  
                                                    Last Documented On   
2 4:04PM ; [facility]  
  
  
  
                                        Chronic illness     2021  
  
  
  
                                                    Last Documented On   
1 7:49AM ; [facility]  
  
  
  
                                        Exposure to COVID-19 2021  
  
  
  
                                                    Last Documented On   
1 12:31PM ; [facility]  
  
  
  
                                        History of gynecologic disorder 20  
21  
  
  
  
                                                    Last Documented On   
1 4:06PM ; [facility]  
  
  
  
                                        History of Polycystic Ovarian Syndrome (  
PCOS) 2021  
  
  
  
                                                    Last Documented On   
1 4:06PM ; [facility]  
  
  
  
                                        History of anxiety disorder NOS 20  
21  
  
  
  
                                                    Last Documented On   
1 4:06PM ; [facility]  
  
  
  
                                        History of migraine headache 2021  
  
  
  
                                                    Last Documented On   
1 4:06PM ; [facility]  
  
  
  
                                        History of psychiatric disorders biopola  
r disorder 2021  
  
  
  
                                                    Last Documented On   
1 4:06PM ; [facility]  
Work Phone: [phone]- History general Narrative - Reported  
  
Includes: Medical History in patient's chart  
  
  
  
                                        Description         Last Updated  
   
                                        No Safety Measures  2024  
  
  
  
                                                    Last Documented On   
4 4:15PM ; [facility]  
  
  
  
                                        Consent form on file for procedure   
  
  
  
                                                    Last Documented On 10/04/202  
4 1:56PM ; [facility]  
  
  
  
                                        POTS                2024  
  
  
  
                                                    Last Documented On   
4 6:51PM ; [facility]  
  
  
  
                                        0 previous live birth(s) 2024  
  
  
  
                                                    Last Documented On   
4 7:50PM ; [facility]  
  
  
  
                                        Previously pregnant 1 time(s) 2024  
  
  
  
                                                    Last Documented On   
4 7:50PM ; [facility]  
  
  
  
                                        Recent immunization for flu 2024  
  
  
  
                                                    Last Documented On   
4 7:50PM ; [facility]  
  
  
  
                                        Has sex without a condom 2022  
  
  
  
                                                    Last Documented On   
2 4:04PM ; [facility]  
  
  
  
                                        Not planning to have a baby in the next   
12 months 2022  
  
  
  
                                                    Last Documented On   
2 4:04PM ; [facility]  
  
  
  
                                        Partners sexually transmitted infection   
status known 2022  
  
  
  
                                                    Last Documented On   
2 4:04PM ; [facility]  
  
  
  
                                        No previous hospitalizations 2022  
  
  
  
                                                    Last Documented On   
2 4:04PM ; [facility]  
  
  
  
                                        Chronic illness     2021  
  
  
  
                                                    Last Documented On   
1 7:49AM ; [facility]  
  
  
  
                                        Exposure to COVID-19 2021  
  
  
  
                                                    Last Documented On   
1 12:31PM ; [facility]  
  
  
  
                                        History of gynecologic disorder 20  
21  
  
  
  
                                                    Last Documented On   
1 4:06PM ; [facility]  
  
  
  
                                        History of Polycystic Ovarian Syndrome (  
PCOS) 2021  
  
  
  
                                                    Last Documented On   
1 4:06PM ; [facility]  
  
  
  
                                        History of anxiety disorder NOS 20  
21  
  
  
  
                                                    Last Documented On   
1 4:06PM ; [facility]  
  
  
  
                                        History of migraine headache 2021  
  
  
  
                                                    Last Documented On   
1 4:06PM ; [facility]  
  
  
  
                                        History of psychiatric disorders biopola  
r disorder 2021  
  
  
  
                                                    Last Documented On   
1 4:06PM ; [facility]  
Work Phone: [phone]- Evaluation note  
  
Includes: Assessments for all patient encounters  
  
  
  
                                Findings        Encounter       Date  
   
                                                    Attention-deficit hyperactiv  
ity   
disorder                                 Established Patient with Radha Young LSW                               2024  
  
  
  
                                                    Last Documented On   
4 4:15PM ; [facility]  
  
  
  
                                                    Bipolar I disorder, most rec  
ent   
episode, depressed - mild                Established Patient with Radha Young LSW                               2024  
  
  
  
                                                    Last Documented On   
4 4:15PM ; [facility]  
  
  
  
                                Nicotine dependence  Established Patient with   
Radhaleslie Young LSW 2024  
  
  
  
                                                    Last Documented On   
4 4:15PM ; [facility]  
  
  
  
                                        Post-traumatic stress disorder  Establ  
ished Patient with Radha Young   
LSW                                     2024  
  
  
  
                                                    Last Documented On   
4 4:15PM ; [facility]  
  
  
  
                                                    [Z68.43 - Body mass index [B  
MI]   
50.0-59.9, adult] assessment of body   
mass index                              Medical Established Patient with   
Doreen Cabrera CNP                        2024  
  
  
  
                                                    Last Documented On   
4 5:46PM ; [facility]  
  
  
  
                                                    Bipolar I disorder, most rec  
ent   
episode, depressed - mild               Medical Established Patient with Doreen   
Deborah CNP                              2024  
  
  
  
                                                    Last Documented On   
4 5:46PM ; [facility]  
  
  
  
                                Caries          Medical Established Patient with  
 Doreen Deborah CNP 2024  
  
  
  
                                                    Last Documented On   
4 5:46PM ; [facility]  
  
  
  
                                        Encounter for Immunization Medical Estab  
lished Patient with Doreen Deborah   
CNP                                     2024  
  
  
  
                                                    Last Documented On   
4 5:46PM ; [facility]  
  
  
  
                                                    Type 2 diabetes mellitus wit  
hout   
complication                            Medical Established Patient with   
Doreen Deborah CNP                        2024  
  
  
  
                                                    Last Documented On   
4 5:46PM ; [facility]  
  
  
  
                                        Attention-deficit hyperactivity disorder  
  Established Patient with   
Leonie Short LISWS                     2024  
  
  
  
                                                    Last Documented On   
4 3:28PM ; [facility]  
  
  
  
                                                    Bipolar I disorder, most rec  
ent   
episode, depressed - mild                Established Patient with Leonie   
Short LISWS                             2024  
  
  
  
                                                    Last Documented On   
4 3:28PM ; [facility]  
  
  
  
                                        Post-traumatic stress disorder  Establ  
ished Patient with Leonie Short   
LISWS                                   2024  
  
  
  
                                                    Last Documented On   
4 3:28PM ; [facility]  
  
  
  
                                                    [E11.9 - Type 2 diabetes lobito  
litus   
without complications] type 2 diabetes   
mellitus                                Medical Established Patient with   
Doreen Cabrera CNP                        2024  
  
  
  
                                                    Last Documented On   
4 7:36PM ; [facility]  
  
  
  
                                                    [F41.1 - Generalized anxiety  
   
disorder] generalized anxiety   
disorder                                Medical Established Patient with   
Doreen Cabrera CNP                        2024  
  
  
  
                                                    Last Documented On   
4 7:36PM ; [facility]  
  
  
  
                                                    [I10 - Essential (primary)   
hypertension] essential hypertension    Medical Established Patient with   
Doreen Cabrera CNP                        2024  
  
  
  
                                                    Last Documented On   
4 7:36PM ; [facility]  
  
  
  
                                                    [N94.6 - Dysmenorrhea, unspe  
cified]   
dysmenorrhea                            Medical Established Patient with   
Doreen Cabrera CNP                        2024  
  
  
  
                                                    Last Documented On   
4 7:36PM ; [facility]  
  
  
  
                                                    [Z68.43 - Body mass index [B  
MI]   
50.0-59.9, adult] assessment of body   
mass index                              Medical Established Patient with   
Doreen Cabrera CNP                        2024  
  
  
  
                                                    Last Documented On   
4 7:36PM ; [facility]  
  
  
  
                                        Encounter for Immunization Medical Estab  
lished Patient with Doreen Cabrera   
CNP                                     2024  
  
  
  
                                                    Last Documented On   
4 7:36PM ; [facility]  
  
  
  
                                        Venipuncture was performed Medical Estab  
lished Patient with Doreen Cabrera   
CNP                                     2024  
  
  
  
                                                    Last Documented On   
4 7:36PM ; [facility]  
  
  
  
                                        Attention-deficit hyperactivity disorder  
  Established Patient with   
Leonie Short LISWS                     2024  
  
  
  
                                                    Last Documented On   
4 10:10AM ; [facility]  
  
  
  
                                                    Bipolar I disorder, most rec  
ent   
episode, depressed - mild                Established Patient with Leonie   
Short LISWS                             2024  
  
  
  
                                                    Last Documented On   
4 10:10AM ; [facility]  
  
  
  
                                        Post-traumatic stress disorder  Establ  
ished Patient with Leonie Short   
LISWS                                   2024  
  
  
  
                                                    Last Documented On   
4 10:10AM ; [facility]  
  
  
  
                                        [D64.9 - Anemia, unspecified] anemia Med  
ical Established Patient with   
Doreen Cabrera CNP                        2024  
  
  
  
                                                    Last Documented On   
4 6:51PM ; [facility]  
  
  
  
                                                    [Z68.43 - Body mass index [B  
MI]   
50.0-59.9, adult] assessment of body   
mass index                              Medical Established Patient with   
Doreen Cabrera CNP                        2024  
  
  
  
                                                    Last Documented On   
4 6:51PM ; [facility]  
  
  
  
                                                    Bipolar affective disorder,   
current   
episode depressed, mild                  Established Patient with Leonie   
Short LISWS                             2024  
  
  
  
                                                    Last Documented On   
4 5:16PM ; [facility]  
  
  
  
                                        Post-traumatic stress disorder  Establ  
ished Patient with Leonie Short   
LISWS                                   2024  
  
  
  
                                                    Last Documented On   
4 5:16PM ; [facility]  
  
  
  
                                                    Undifferentiated attention d  
eficit   
disorder                                 Established Patient with   
Leonie Short LISWS                     2024  
  
  
  
                                                    Last Documented On   
4 5:16PM ; [facility]  
  
  
  
                                        Visit for: screening for disorder BH Est  
ablished Patient with Leonie   
Short LISWS                             2024  
  
  
  
                                                    Last Documented On   
4 5:16PM ; [facility]  
  
  
  
                                                    [Z68.43 - Body mass index [B  
MI]   
50.0-59.9, adult] assessment of body   
mass index                              Medical Established Patient with   
Doreen Cabrera CNP                        2024  
  
  
  
                                                    Last Documented On   
4 7:50PM ; [facility]  
  
  
  
                                        Attention-deficit hyperactivity disorder  
 Medical Established Patient with   
Doreen Cabrera CNP                        2024  
  
  
  
                                                    Last Documented On   
4 7:50PM ; [facility]  
  
  
  
                                                    Diabetes Risk Test Score was  
 three   
score 3/27/2024                         Medical Established Patient with Doreen Cabrera CNP                              2024  
  
  
  
                                                    Last Documented On   
4 7:50PM ; [facility]  
  
  
  
                                        Visit for: screening for STD Medical Est  
ablished Patient with Doreen Cabrera   
CNP                                     2024  
  
  
  
                                                    Last Documented On   
4 7:50PM ; [facility]  
  
  
  
                                                    [Z68.32 - Body mass index [B  
MI]   
32.0-32.9, adult] assessment of body   
mass index                              Medical Established Patient with   
Doreen Cabrera CNP                        2023  
  
  
  
                                                    Last Documented On   
3 6:01PM ; [facility]  
  
  
  
                                        Borderline personality disorder Medical   
Established Patient with Doreenpaola Cabrera CNP                              2023  
  
  
  
                                                    Last Documented On   
3 6:01PM ; [facility]  
  
  
  
                                        Screening for diabetes mellitus Medical   
Established Patient with Doreenpaola Cabrera CNP                              2023  
  
  
  
                                                    Last Documented On   
3 6:01PM ; [facility]  
  
  
  
                                        Assessment of body mass index Medical Es  
tablished Patient with Doreenpaola Cabrera CNP                              10/21/2022  
  
  
  
                                                    Last Documented On 10/21/202  
2 2:45PM ; [facility]  
  
  
  
                                                    Bipolar affective disorder,   
current   
episode manic                            [facility] with Haylie   
Aguilar [facility]                        2022  
  
  
  
                                                    Last Documented On   
2 3:22PM ; [facility]  
  
  
  
                                                    Bipolar affective disorder,   
current   
episode manic                            Established Patient with Haylie   
Aguilar LP[facility]S                        2022  
  
  
  
                                                    Last Documented On   
2 2:28PM ; [facility]  
  
  
  
                                                    Bipolar affective disorder,   
current   
episode depressed, severe with   
psychosis                                [facility] with Haylie   
Aguilar [facility]                        2022  
  
  
  
                                                    Last Documented On   
2 3:29PM ; [facility]  
  
  
  
                                                    Assessment of body mass inde  
x [Body   
mass index [BMI] 50.0-59.9, adult]      Open Access - Established with Julieth   
Aliya CNP                               2022  
  
  
  
                                                    Last Documented On   
2 9:36AM ; [facility]  
  
  
  
                                                    Bipolar I disorder, most rec  
ent   
episode, manic                          [facility] - Established with Julieth   
Aliya CNP                               2022  
  
  
  
                                                    Last Documented On   
2 9:36AM ; [facility]  
  
  
  
                                        Post-traumatic stress disorder BH Establ  
ished Patient with Haylie Aguilar   
[facility]                                  2022  
  
  
  
                                                    Last Documented On   
2 3:20PM ; [facility]  
  
  
  
                                No cough        Medical Established Patient with  
 Doreenpaola Mccormacken CNP 2022  
  
  
  
                                                    Last Documented On   
2 4:04PM ; [facility]  
  
  
  
                                                    Z68.43 - Body mass index [BM  
I]   
50.0-59.9, adult                        Medical Established Patient with   
Doreen Deborah [facility]                        2022  
  
  
  
                                                    Last Documented On   
2 4:04PM ; [facility]  
  
  
  
                                                    Borderline personality disor  
danny Pt   
reported hx of sx/dx                     [facility]                        [facility]  
  
  
  
                                                    Last Documented On   
2 4:08PM ; [facility]  
  
  
  
                                                    Assessment of body mass inde  
x [Body   
mass index [BMI] 50.0-59.9, adult]      Open Access - Established with Julieth   
Aliya CNP                               2022  
  
  
  
                                                    Last Documented On   
2 7:41PM ; [facility]  
  
  
  
                                                    Diabetes Risk Test Score was  
 three   
score 2022                         Open Access - Established with Julieth   
Aliya CNP                               2022  
  
  
  
                                                    Last Documented On   
2 7:41PM ; [facility]  
  
  
  
                                                    Bipolar I disorder, most rec  
ent   
episode, manic                           [facility]  
  
  
  
                                                    Last Documented On   
1 1:35AM ; [facility]  
  
  
  
                                        Borderline personality disorder  Estab  
lished Patient [facility]   
[facility]                          [facility]  
  
  
  
                                                    Last Documented On   
1 1:35AM ; [facility]  
  
  
  
                                        Post-traumatic stress disorder  Establ  
ished Patient with [facility]vis   
[facility]                          [facility]  
  
  
  
                                                    Last Documented On   
1 1:35AM ; [facility]  
  
  
  
                                                    Assessment of visit for: bhargavi  
eening for   
human immunodeficiency virus            Medical Established Patient with   
Doreen Deborah [facility]                        2021  
  
  
  
                                                    Last Documented On   
1 2:56PM ; [facility]  
  
  
  
                                Nicotine dependence Medical Established Patient   
with Doreen Deborah CNP 2021  
  
  
  
                                                    Last Documented On   
1 2:56PM ; [facility]  
  
  
  
                                Tachycardia     Medical Established Patient with  
 Doreen Deborah CNP 2021  
  
  
  
                                                    Last Documented On   
1 2:56PM ; [facility]  
  
  
  
                                                    Z68.43 - Body mass index [BM  
I]   
50.0-59.9, adult                        Medical Established Patient with   
Doreen Deborah [facility]                        2021  
  
  
  
                                                    Last Documented On   
1 2:56PM ; [facility]  
  
  
  
                                                    Bipolar I disorder, most rec  
ent   
episode, manic                           Established Patient with Leonie   
Short [facility]                             2021  
  
  
  
                                                    Last Documented On   
1 10:17AM ; [facility]  
  
  
  
                                                    Borderline personality disor  
danny per   
patient reported history                 Established Patient with Leonie   
Short [facility]                             2021  
  
  
  
                                                    Last Documented On   
1 10:17AM ; [facility]  
  
  
  
                                Nicotine dependence BH Established Patient with   
Leonie Short [facility] 2021  
  
  
  
                                                    Last Documented On   
1 10:17AM ; [facility]  
  
  
  
                                        Post-traumatic stress disorder  Establ  
ished Patient with Leonie Short   
LISWS                                   2021  
  
  
  
                                                    Last Documented On   
1 10:17AM ; [facility]  
  
  
  
                                Body mass index Medical Established Patient with  
 Doreen Deborah CNP 2021  
  
  
  
                                                    Last Documented On   
1 5:23PM ; [facility]  
  
  
  
                                Morbid obesity  Medical Established Patient with  
 Doreen Deborah CNP 2021  
  
  
  
                                                    Last Documented On   
1 5:23PM ; [facility]  
  
  
  
                                        Nicotine dependence uncomplicated Medica  
l Established Patient with Doreen   
Deborah CNP                              2021  
  
  
  
                                                    Last Documented On   
1 5:23PM ; [facility]  
  
  
  
                                                    Z68.42 - Body mass index [BM  
I]   
45.0-49.9, adult                        Medical Established Patient with   
Doreen Deborah CNP                        2021  
  
  
  
                                                    Last Documented On   
1 5:23PM ; [facility]  
  
  
  
                                Episodic mood disorders On Call with Pamela edwards CNP 2021  
  
  
  
                                                    Last Documented On   
1 7:49AM ; [facility]  
  
  
  
                                                    Mood disorders, NOS per tabatha  
ent   
reported history                         Established Patient with Leonie   
Short [facility]                             2021  
  
  
  
                                                    Last Documented On   
1 7:15PM ; [facility]  
  
  
  
                                        Post-traumatic stress disorder  Establ  
ished Patient with Leonie Short   
LISWS                                   2021  
  
  
  
                                                    Last Documented On   
1 7:15PM ; [facility]  
  
  
  
                                Morbid obesity  Medical Established Patient with  
 Doreen Deborah CNP 2021  
  
  
  
                                                    Last Documented On   
1 12:31PM ; [facility]  
  
  
  
                                Otitis externa  Medical Established Patient with  
 Doreen Deborah CNP 2021  
  
  
  
                                                    Last Documented On   
1 12:31PM ; [facility]  
  
  
  
                                                    Z68.42 - Body mass index [BM  
I]   
45.0-49.9, adult                        Medical Established Patient with   
Doreen Deborah CNP                        2021  
  
  
  
                                                    Last Documented On   
1 12:31PM ; [facility]  
  
  
  
                                        Post-traumatic stress disorder  Establ  
ished Patient with Leonie Short   
LISWS                                   06/10/2021  
  
  
  
                                                    Last Documented On 06/10/202  
1 10:05PM ; [facility]  
  
  
  
                                Morbid obesity  Medical Established Patient with  
 Doreen Deborah CNP 06/10/2021  
  
  
  
                                                    Last Documented On 06/10/202  
1 4:45PM ; [facility]  
  
  
  
                                                    Z68.42 - Body mass index [BM  
I]   
45.0-49.9, adult                        Medical Established Patient with   
Doreen Deborah CNP                        06/10/2021  
  
  
  
                                                    Last Documented On 06/10/202  
1 4:45PM ; [facility]  
  
  
  
                                        Post-traumatic stress disorder BH Establ  
ished Patient with Leonie Short   
LISWS                                   2021  
  
  
  
                                                    Last Documented On   
1 11:59AM ; [facility]  
  
  
  
                                                    Assessment of visit for: bhargavi myles for   
human immunodeficiency virus            Medical New Patient with Doreen   
Deborah CNP                              2021  
  
  
  
                                                    Last Documented On   
1 4:06PM ; [facility]  
  
  
  
                                                    Diabetes Risk Test Score was  
 one score   
2021                               Medical New Patient with Doreen   
Deborah CNP                              2021  
  
  
  
                                                    Last Documented On   
1 4:06PM ; [facility]  
  
  
  
                                Hypertension    Medical New Patient with Doreen C  
cate CNP 2021  
  
  
  
                                                    Last Documented On   
1 4:06PM ; [facility]  
  
  
  
                                Morbid obesity  Medical New Patient with Doreen C  
cate CNP 2021  
  
  
  
                                                    Last Documented On   
1 4:06PM ; [facility]  
  
  
  
                                Post-traumatic stress disorder Medical New Patie  
nt with Doreen Deborah CNP   
2021  
  
  
  
                                                    Last Documented On   
1 4:06PM ; [facility]  
  
  
  
                                                    Z68.42 - Body mass index [BM  
I]   
45.0-49.9, adult                        Medical New Patient with Doreen Cabrera CNP                              2021  
  
  
  
                                                    Last Documented On   
1 4:06PM ; [facility]  
Work Phone: [phone]- Evaluation note  
  
Includes: Assessments for all patient encounters  
  
  
  
                                Findings        Encounter       Date  
   
                                                    Attention-deficit hyperactiv  
ity   
disorder                                 Established Patient with Radhaleslie Young LSW                               2024  
  
  
  
                                                    Last Documented On   
4 4:15PM ; [facility]  
  
  
  
                                                    Bipolar I disorder, most rec  
ent   
episode, depressed - mild                Established Patient with Radha   
Young LSW                               2024  
  
  
  
                                                    Last Documented On   
4 4:15PM ; [facility]  
  
  
  
                                Nicotine dependence  Established Patient with   
Radha Young W 2024  
  
  
  
                                                    Last Documented On   
4 4:15PM ; [facility]  
  
  
  
                                        Post-traumatic stress disorder  Establ  
ished Patient with Radha Young   
W                                     2024  
  
  
  
                                                    Last Documented On   
4 4:15PM ; [facility]  
  
  
  
                                                    [Z68.43 - Body mass index [B  
MI]   
50.0-59.9, adult] assessment of body   
mass index                              Medical Established Patient with   
Doreen Cabrera CNP                        2024  
  
  
  
                                                    Last Documented On 10/04/202  
4 1:56PM ; [facility]  
  
  
  
                                                    Bipolar I disorder, most rec  
ent   
episode, depressed - mild               Medical Established Patient with Doreen   
Deborah CNP                              2024  
  
  
  
                                                    Last Documented On 10/04/202  
4 1:56PM ; [facility]  
  
  
  
                                Caries          Medical Established Patient with  
 Doreen Deborah CNP 2024  
  
  
  
                                                    Last Documented On 10/04/202  
4 1:56PM ; [facility]  
  
  
  
                                        Encounter for Immunization Medical Estab  
lished Patient with Doreen Deborah   
CNP                                     2024  
  
  
  
                                                    Last Documented On 10/04/202  
4 1:56PM ; [facility]  
  
  
  
                                                    Type 2 diabetes mellitus wit  
hout   
complication                            Medical Established Patient with   
Doreen Deborah CNP                        2024  
  
  
  
                                                    Last Documented On 10/04/202  
4 1:56PM ; [facility]  
  
  
  
                                        Attention-deficit hyperactivity disorder  
  Established Patient with   
Leonie Short [facility]                     2024  
  
  
  
                                                    Last Documented On   
4 3:28PM ; [facility]  
  
  
  
                                                    Bipolar I disorder, most rec  
ent   
episode, depressed - mild               BH Established Patient with Leonie   
Short [facility]                             2024  
  
  
  
                                                    Last Documented On   
4 3:28PM ; [facility]  
  
  
  
                                        Post-traumatic stress disorder BH Establ  
ished Patient with Leonie Short   
LISWS                                   2024  
  
  
  
                                                    Last Documented On   
4 3:28PM ; [facility]  
  
  
  
                                                    [E11.9 - Type 2 diabetes lobito  
litus   
without complications] type 2 diabetes   
mellitus                                Medical Established Patient with   
Doreen Cabrera CNP                        2024  
  
  
  
                                                    Last Documented On   
4 7:36PM ; [facility]  
  
  
  
                                                    [F41.1 - Generalized anxiety  
   
disorder] generalized anxiety   
disorder                                Medical Established Patient with   
Doreen Cabrera CNP                        2024  
  
  
  
                                                    Last Documented On   
4 7:36PM ; [facility]  
  
  
  
                                                    [I10 - Essential (primary)   
hypertension] essential hypertension    Medical Established Patient with   
Doreen Cabrera CNP                        2024  
  
  
  
                                                    Last Documented On   
4 7:36PM ; [facility]  
  
  
  
                                                    [N94.6 - Dysmenorrhea, unspe  
cified]   
dysmenorrhea                            Medical Established Patient with   
Doreen Cabrera CNP                        2024  
  
  
  
                                                    Last Documented On   
4 7:36PM ; [facility]  
  
  
  
                                                    [Z68.43 - Body mass index [B  
MI]   
50.0-59.9, adult] assessment of body   
mass index                              Medical Established Patient with   
Doreen Cabrera CNP                        2024  
  
  
  
                                                    Last Documented On   
4 7:36PM ; [facility]  
  
  
  
                                        Encounter for Immunization Medical Estab  
lished Patient with Doreen Deborah   
CNP                                     2024  
  
  
  
                                                    Last Documented On   
4 7:36PM ; [facility]  
  
  
  
                                        Venipuncture was performed Medical Estab  
lished Patient with Doreen Deborah   
CNP                                     2024  
  
  
  
                                                    Last Documented On   
4 7:36PM ; [facility]  
  
  
  
                                        Attention-deficit hyperactivity disorder  
  Established Patient with   
Leonie Short [facility]                     2024  
  
  
  
                                                    Last Documented On   
4 10:10AM ; [facility]  
  
  
  
                                                    Bipolar I disorder, most rec  
ent   
episode, depressed - mild                Established Patient with Leonie   
Short LISWS                             2024  
  
  
  
                                                    Last Documented On   
4 10:10AM ; [facility]  
  
  
  
                                        Post-traumatic stress disorder BH Establ  
ished Patient with Leonie Short   
LISWS                                   2024  
  
  
  
                                                    Last Documented On   
4 10:10AM ; [facility]  
  
  
  
                                        [D64.9 - Anemia, unspecified] anemia Med  
ical Established Patient with   
Doreen Cabrera CNP                        2024  
  
  
  
                                                    Last Documented On   
4 6:51PM ; [facility]  
  
  
  
                                                    [Z68.43 - Body mass index [B  
MI]   
50.0-59.9, adult] assessment of body   
mass index                              Medical Established Patient with   
Doreen Cabrera CNP                        2024  
  
  
  
                                                    Last Documented On   
4 6:51PM ; [facility]  
  
  
  
                                                    Bipolar affective disorder,   
current   
episode depressed, mild                  Established Patient with Leonie   
Short LISWS                             2024  
  
  
  
                                                    Last Documented On   
4 5:16PM ; [facility]  
  
  
  
                                        Post-traumatic stress disorder  Establ  
ished Patient with Leonie Short   
LISWS                                   2024  
  
  
  
                                                    Last Documented On   
4 5:16PM ; [facility]  
  
  
  
                                                    Undifferentiated attention d  
eficit   
disorder                                 Established Patient with   
Leonie Short [facility]                     2024  
  
  
  
                                                    Last Documented On   
4 5:16PM ; [facility]  
  
  
  
                                        Visit for: screening for disorder BH Est  
ablished Patient with Leonie   
Short LISWS                             2024  
  
  
  
                                                    Last Documented On   
4 5:16PM ; [facility]  
  
  
  
                                                    [Z68.43 - Body mass index [B  
MI]   
50.0-59.9, adult] assessment of body   
mass index                              Medical Established Patient with   
Doreen Cabrera CNP                        2024  
  
  
  
                                                    Last Documented On   
4 7:50PM ; [facility]  
  
  
  
                                        Attention-deficit hyperactivity disorder  
 Medical Established Patient with   
Doreen Cabrera CNP                        2024  
  
  
  
                                                    Last Documented On   
4 7:50PM ; [facility]  
  
  
  
                                                    Diabetes Risk Test Score was  
 three   
score 3/27/2024                         Medical Established Patient with Doreen Cabrera CNP                              2024  
  
  
  
                                                    Last Documented On   
4 7:50PM ; [facility]  
  
  
  
                                        Visit for: screening for STD Medical Est  
ablished Patient with Doreen Cabrera   
CNP                                     2024  
  
  
  
                                                    Last Documented On   
4 7:50PM ; [facility]  
  
  
  
                                                    [Z68.32 - Body mass index [B  
MI]   
32.0-32.9, adult] assessment of body   
mass index                              Medical Established Patient with   
Doreen Cabrera CNP                        2023  
  
  
  
                                                    Last Documented On   
3 6:01PM ; [facility]  
  
  
  
                                        Borderline personality disorder Medical   
Established Patient with Doreen Cabrera CNP                              2023  
  
  
  
                                                    Last Documented On   
3 6:01PM ; [facility]  
  
  
  
                                        Screening for diabetes mellitus Medical   
Established Patient with Doreen Cabrera CNP                              2023  
  
  
  
                                                    Last Documented On   
3 6:01PM ; [facility]  
  
  
  
                                        Assessment of body mass index Medical Es  
tablished Patient with Doreen Cabrera CNP                              10/21/2022  
  
  
  
                                                    Last Documented On 10/21/202  
2 2:45PM ; [facility]  
  
  
  
                                                    Bipolar affective disorder,   
current   
episode manic                            Tele[facility] with Haylie   
Aguilar LP[facility]-S                        2022  
  
  
  
                                                    Last Documented On   
2 3:22PM ; [facility]  
  
  
  
                                                    Bipolar affective disorder,   
current   
episode manic                            Established Patient with Haylie   
Aguilar LPCC-S                        2022  
  
  
  
                                                    Last Documented On   
2 2:28PM ; [facility]  
  
  
  
                                                    Bipolar affective disorder,   
current   
episode depressed, severe with   
psychosis                                Tele[facility] with Haylie   
Aguilar LPCC-S                        2022  
  
  
  
                                                    Last Documented On   
2 3:29PM ; [facility]  
  
  
  
                                                    Assessment of body mass inde  
x [Body   
mass index [BMI] 50.0-59.9, adult]      Open Access - Established with Julieth   
Aliya CNP                               2022  
  
  
  
                                                    Last Documented On   
2 9:36AM ; [facility]  
  
  
  
                                                    Bipolar I disorder, most rec  
ent   
episode, manic                          Open Access - Established with Julieth   
Aliya CNP                               2022  
  
  
  
                                                    Last Documented On   
2 9:36AM ; [facility]  
  
  
  
                                        Post-traumatic stress disorder BH Establ  
ished Patient with Haylie Aguilar   
LPCC-S                                  2022  
  
  
  
                                                    Last Documented On   
2 3:20PM ; [facility]  
  
  
  
                                No cough        Medical Established Patient with  
 Doreen Deborah CNP 2022  
  
  
  
                                                    Last Documented On   
2 4:04PM ; [facility]  
  
  
  
                                                    Z68.43 - Body mass index [BM  
I]   
50.0-59.9, adult                        Medical Established Patient with   
Doreen Deborah [facility]                        2022  
  
  
  
                                                    Last Documented On   
2 4:04PM ; [facility]  
  
  
  
                                                    Borderline personality disor  
danny Pt   
reported hx of sx/dx                     Established Patient with Haylie Aguilar LPCC-S                        2022  
  
  
  
                                                    Last Documented On   
2 4:08PM ; [facility]  
  
  
  
                                                    Assessment of body mass inde  
x [Body   
mass index [BMI] 50.0-59.9, adult]      Open Access - Established with Juliethmaikel Pisano CNP                               2022  
  
  
  
                                                    Last Documented On   
2 7:41PM ; [facility]  
  
  
  
                                                    Diabetes Risk Test Score was  
 three   
score 2022                         Open Access - Established with Julieth   
Aliya CNP                               2022  
  
  
  
                                                    Last Documented On   
2 7:41PM ; [facility]  
  
  
  
                                                    Bipolar I disorder, most rec  
ent   
episode, manic                           Established Patient with Avis   
Felder LPCC-S                          2021  
  
  
  
                                                    Last Documented On   
1 1:35AM ; [facility]  
  
  
  
                                        Borderline personality disorder  Estab  
lished Patient with Avis   
Felder LPCC-S                          2021  
  
  
  
                                                    Last Documented On   
1 1:35AM ; [facility]  
  
  
  
                                        Post-traumatic stress disorder  Establ  
ished Patient with Avis   
Felder LPCC-S                          2021  
  
  
  
                                                    Last Documented On   
1 1:35AM ; [facility]  
  
  
  
                                                    Assessment of visit for: scr  
eening for   
human immunodeficiency virus            Medical Established Patient with   
Doreenpaola Mccormacken [facility]                        2021  
  
  
  
                                                    Last Documented On   
1 2:56PM ; [facility]  
  
  
  
                                Nicotine dependence Medical Established Patient   
with Doreen Deborah CNP 2021  
  
  
  
                                                    Last Documented On   
1 2:56PM ; [facility]  
  
  
  
                                Tachycardia     Medical Established Patient with  
 Doreen Deborah CNP 2021  
  
  
  
                                                    Last Documented On   
1 2:56PM ; [facility]  
  
  
  
                                                    Z68.43 - Body mass index [BM  
I]   
50.0-59.9, adult                        Medical Established Patient with   
Doreenpaola Cabrera CNP                        2021  
  
  
  
                                                    Last Documented On   
1 2:56PM ; [facility]  
  
  
  
                                                    Bipolar I disorder, most rec  
ent   
episode, manic                           Established Patient with Leonie   
Short [facility]                             2021  
  
  
  
                                                    Last Documented On   
1 10:17AM ; [facility]  
  
  
  
                                                    Borderline personality disor  
danny per   
patient reported history                 Established Patient with Leonie   
Short [facility]                             2021  
  
  
  
                                                    Last Documented On   
1 10:17AM ; [facility]  
  
  
  
                                Nicotine dependence BH Established Patient with   
Leonie Short LISWS 2021  
  
  
  
                                                    Last Documented On   
1 10:17AM ; [facility]  
  
  
  
                                        Post-traumatic stress disorder  Establ  
ished Patient with Leonie Short   
LISWS                                   2021  
  
  
  
                                                    Last Documented On   
1 10:17AM ; [facility]  
  
  
  
                                Body mass index Medical Established Patient with  
 Doreen Deborah CNP 2021  
  
  
  
                                                    Last Documented On   
1 5:23PM ; [facility]  
  
  
  
                                Morbid obesity  Medical Established Patient with  
 Doreen Deborah CNP 2021  
  
  
  
                                                    Last Documented On   
1 5:23PM ; [facility]  
  
  
  
                                        Nicotine dependence uncomplicated Medica  
l Established Patient with Doreen   
Deborah CNP                              2021  
  
  
  
                                                    Last Documented On   
1 5:23PM ; [facility]  
  
  
  
                                                    Z68.42 - Body mass index [BM  
I]   
45.0-49.9, adult                        Medical Established Patient with   
Doreen Deborah CNP                        2021  
  
  
  
                                                    Last Documented On   
1 5:23PM ; [facility]  
  
  
  
                                Episodic mood disorders On Call with Pamela edwards CNP 2021  
  
  
  
                                                    Last Documented On   
1 7:49AM ; [facility]  
  
  
  
                                                    Mood disorders, NOS per tabatha  
ent   
reported history                         Established Patient with Leonie   
Short LISWS                             2021  
  
  
  
                                                    Last Documented On   
1 7:15PM ; [facility]  
  
  
  
                                        Post-traumatic stress disorder  Establ  
ished Patient with Leonie Short   
LISWS                                   2021  
  
  
  
                                                    Last Documented On   
1 7:15PM ; [facility]  
  
  
  
                                Morbid obesity  Medical Established Patient with  
 Doreen Deborah CNP 2021  
  
  
  
                                                    Last Documented On   
1 12:31PM ; [facility]  
  
  
  
                                Otitis externa  Medical Established Patient with  
 Doreen Deborah CNP 2021  
  
  
  
                                                    Last Documented On   
1 12:31PM ; [facility]  
  
  
  
                                                    Z68.42 - Body mass index [BM  
I]   
45.0-49.9, adult                        Medical Established Patient with   
Doreen Deborah CNP                        2021  
  
  
  
                                                    Last Documented On   
1 12:31PM ; [facility]  
  
  
  
                                        Post-traumatic stress disorder  Establ  
ished Patient with Leonie Short   
LISWS                                   06/10/2021  
  
  
  
                                                    Last Documented On 06/10/202  
1 10:05PM ; [facility]  
  
  
  
                                Morbid obesity  Medical Established Patient with  
 Doreen Deborah CNP 06/10/2021  
  
  
  
                                                    Last Documented On 06/10/202  
1 4:45PM ; [facility]  
  
  
  
                                                    Z68.42 - Body mass index [BM  
I]   
45.0-49.9, adult                        Medical Established Patient with   
Doreen Deborah CNP                        06/10/2021  
  
  
  
                                                    Last Documented On 06/10/202  
1 4:45PM ; [facility]  
  
  
  
                                        Post-traumatic stress disorder  Establ  
ished Patient with Leonie Short   
LISWS                                   2021  
  
  
  
                                                    Last Documented On   
1 11:59AM ; [facility]  
  
  
  
                                                    Assessment of visit for: scr  
eening for   
human immunodeficiency virus            Medical New Patient with Doreen Cabrera CNP                              2021  
  
  
  
                                                    Last Documented On   
1 4:06PM ; [facility]  
  
  
  
                                                    Diabetes Risk Test Score was  
 one score   
2021                               Medical New Patient with Doreenpaola Cabrera CNP                              2021  
  
  
  
                                                    Last Documented On   
1 4:06PM ; [facility]  
  
  
  
                                Hypertension    Medical New Patient with Doreen DAVID almonteen CNP 2021  
  
  
  
                                                    Last Documented On   
1 4:06PM ; [facility]  
  
  
  
                                Morbid obesity  Medical New Patient with Doreen C  
cate CNP 2021  
  
  
  
                                                    Last Documented On   
1 4:06PM ; [facility]  
  
  
  
                                Post-traumatic stress disorder Medical New Patie  
nt with Doreen Cabrera CNP   
2021  
  
  
  
                                                    Last Documented On   
1 4:06PM ; [facility]  
  
  
  
                                                    Z68.42 - Body mass index [BM  
I]   
45.0-49.9, adult                        Medical New Patient with Doreen Cabrera CNP                              2021  
  
  
  
                                                    Last Documented On   
1 4:06PM ; [facility]  
Work Phone: [phone]- Progress note*   
  
Progress note  
  
  
  
                                Date            Encounter       Last Documented   
by  
   
                                2024       Established Patient Last docu  
mented on 2024; 4:15 PM, Radha MONTERO; [facility]  
  
  
  
                                                      
  
  
** Active Problems & Conditions **  
- F90.9 - Attention-deficit Hyperactivity Disorder  
- F31.31 - Bipolar I Disorder, Most Recent Episode, Depressed Mild  
- E11.9 - Diabetes Mellitus Type 2 Without Complication  
- N94.6 - Dysmenorrhea  
- F43.10 - Post-traumatic Stress Disorder  
  
** Subjective **  
[facility] met with patient for mood and medications.  
PHQ9 score 18 ESTHELA score 21  
Patient reports that her depression and anxiety are not good at the moment.  
She is not sleeping well and does not feel that the trazedone is helping. She 
would   
like to start seroquel.  
Patient would like to go back to counseling and was provided resources.  
Pt is reporting that she is haivng nightmares and they are from her past.  
Discussed EMDR therapy for Pt to address her PTSD.  
Encouraged patient to engage in more physical activity to help with sleep. She   
reports that she just not tired to be able to sleep but feels tired. . Pt would 
like   
to walk around a track but is fearful that she will see her father.  
Pt was present with her mother who agreed to go with her walking.  
Discussed [facility] patient the benefits of seeing a psychiatrist for evaluation for 
ADHD.  
  
** Chief Complaint **  
The Chief Complaint is: Follow up for mood and medications.  
  
** History of Present Illness **  
Linnette Beckham is a 24 year old female.  
- Appetite not normal - Irritability  
- Anxiety - Depression severe - Sleep disturbances Will be starting seroquel - 
Energy   
level is good - Easily distracted  
  
** Current Medication **  
- Alcohol Pads 70% use to cleanse skin prior to checking blood sugars, 90 days, 
3   
refills  
- ARIPiprazole 5 MG Oral Tablet take 1 tablet by mouth once daily, 30 days, 5 
refills  
- Atorvastatin Calcium 20 MG Oral Tablet take 1 tablet by mouth once daily at   
bedtime, 30 days, 5 refills  
- Blood Glucose System Sriram Kit use to check sugars once daily (use what is 
covered),   
30 days, 0 refills  
- busPIRone HCl 10 MG Oral Tablet take 1 tablet by mouth twice daily (d/c 5 mg 
rx),   
30 days, 5 refills  
- CareSens Lancets Miscellaneous use to check sugars once daily (dispense what 
is   
covered), 90 days, 3 refills  
- Colace 100 MG Oral Capsule take 1 capsule by mouth once daily at bedtime as 
needed   
for constipation, 30 days, 5 refills  
- [employer] Iron 325 (65 Fe) MG Oral Tablet take 1 tablet by mouth once daily, 30 
days, 5   
refills  
- Intuniv 1 MG Oral Tablet Extended Release 24 Hour take 1 tablet by mouth once 
daily   
at bedtime, 30 days, 5 refills  
- Intuniv 1 MG Oral Tablet Extended Release 24 Hour take 1 tablet by mouth once 
daily   
at bedtime, 30 days, 5 refills  
- Januvia 50 MG Oral Tablet take 1 tablet by mouth once daily, 30 days, 5 
refills  
- lamoTRIgine 100 MG Oral Tablet take 1 tablet by mouth once daily, 30 days, 5   
refills  
- lamoTRIgine 100 MG Oral Tablet take 1 tablet by mouth once daily, 30 days, 5   
refills  
- Lisinopril 5 MG Oral Tablet take 1 tablet by mouth once daily, 30 days, 5 
refills  
- MiraLax 17 GM/SCOOP Oral Powder mix 1 scoop with 8 ounces once daily, 30 days,
 2   
refills  
- Narcan 4 MG/0.1ML Nasal Liquid spray in nostril for symptoms of overdose , may
   
repeat in 2 minutes if symptoms persist, 1 days, 0 refills  
- OneTouch Ultra In Vitro Strip USE ONE TEST STRIP TO CHECK BLOOD SUGARS ONCE 
DAILY,   
90 days, 3 refills  
- Prazosin HCl 1 MG Oral Capsule take 1 capsule by mouth twice daily, 30 days, 5
   
refills  
- Prazosin HCl 1 MG Oral Capsule take 1 capsule by mouth twice daily, 30 days, 5
   
refills  
- SEROquel 50 MG Oral Tablet take 1 tablet by mouth once daily at bedtime (d/c   
trazodone), 30 days, 1 refills  
- Sertraline HCl 50 MG Oral Tablet take 1 tablet by mouth once daily, 30 days, 5
   
refills  
- Sertraline HCl 50 MG Oral Tablet take 1 tablet by mouth once daily, 30 days, 5
   
refills  
- Slynd 4 MG Oral Tablet take 1 tablet by mouth at the same time everyday to 
help   
regulate your period, 28 days, 2 refills  
  
** Past Medical/Surgical History **  
Reported:  
No Safety Measures. Has sex without a condom.  
Medical: No previous hospitalizations. Chronic illness and Sexually transmitted   
infection Partners sexually transmitted infection status known.  
Immunization History: Recent immunization for flu.  
Exposure: Exposure to COVID-19.  
Pregnancy: Previously pregnant 1 time(s) and para having 0 live birth(s). Not   
planning a pregnancy in the next year.  
Diagnoses:  
Polycystic Ovarian Syndrome (PCOS).  
Migraine headache.  
Psychiatric disorders biopolar disorder  
Anxiety disorder  
POTS.  
Procedural:  
- Insertion of ear pressure equalization tubes in both ears  
Surgical:  
- Tonsillectomy  
- Tonsillectomy with adenoidectomy  
  
** Social History **  
Environmental Exposure: Secondhand cigarette smoke exposure.  
Personal: Recent emotional stress.  
Tobacco use: Using electronic cigarettes/vaping.  
Alcohol: Not using alcohol.  
Drug Use: Not using drugs.  
Housing And Economic Circumstances: Lives with parents.  
Sexual: Sexual orientation Lesbian or David and gender identity Other.  
  
** Allergies **  
- Abilify Reaction: groggy  
- Augmentin Reaction: Shock  
- Metformin Reaction: Nausea, Vomiting  
- NO KNOWN ENVIRONMENTAL ALLERGIES  
- NO KNOWN FOOD ALLERGIES  
- Sertraline Reaction: slow/groggy  
- Trazodone Hydrochloride Reaction: Panic attack  
  
** Family History **  
Paternal:  
Systemic hypertension  
Oncologic disorder  
Maternal:  
Systemic hypertension  
Epilepsy and recurrent seizures  
Psychiatric disorders  
Oncologic disorder  
Fraternal:  
Psychiatric disorders  
  
** Physical Findings **  
General Appearance:  
- Normal Appearance.  
Neurological:  
- Cognitive Functions was Normal. - Oriented to time, place, and person. - No   
hallucinations. - Judgement was not impaired.  
Speech: - Is Normal. - No articulation abnormalities.  
Psychiatric:  
- Mood is Euthymic. - Attitude Open.  
Appearance: - Normal.  
Demonstrated Behavior: - Motor Activity Normal Activity. - Eye Contact 
Appropriate.  
Affect: - Congruent with the mood.  
Thought Processes: - Not impaired.  
Thought Content: - Revealed no impairment. - Insight was intact. - No delusions.
 - No   
suicidal ideation. - No Passive thoughts of Death. - No suicidal plans. - No 
suicidal   
intent. - No homicidal ideations. - No homicidal plans. - No homicidal intent.  
Past Medical:  
- No repetitive self injurious behavior.  
  
** Assessment **  
- Attention-deficit hyperactivity disorder [F90.9 - Attention-deficit 
hyperactivity   
disorder, unspecified type]  
- Nicotine dependence [F17.200 - Nicotine dependence, unspecified, 
uncomplicated]  
- Bipolar I disorder, most recent episode, depressed - mild [F31.31 - Bipolar   
disorder, current episode depressed, mild]  
- Post-traumatic stress disorder [F43.10 - Post-traumatic stress disorder,   
unspecified]  
  
** Therapy **  
- Developmental/Behavioral Screening & Testing - PHQ9 and 
Developmental/Behavioral   
Screening & Testing - GAD7.  
- Brief solution-focused.  
- Adherent with medications.  
-  Visit 30 Minutes.  
- Referral to mental health team.  
- Plan - Begin taking the seroquel at bedtime and Collaborated with patient and   
provider:  
  
** Counseling/Education **  
*[facility] offered active and supportive listening, normalized emotions and feelings, 
and   
processed  
current stressors.  
*Discussed engageing in counseling and looking into EMDR to address PTSD and   
increased nightmares.  
  
** Plan **  
*Continue to take medication(s) as prescribed.  
*[facility] to follow-up with patient at next visit as scheduled.  
*Patient to follow up as needed/scheduled.  
  
** Care Team **  
- Doreen Cabrera CNP  
  
** Health Reminders **  
- Assess Tobacco Use satisfied 2024.  
- ESTHELA-2 satisfied 2024.  
- PHQ9 / PHQA satisfied 2024.  
  
** User Defined 1 **  
ESTHELA-2 score was six 2024, ESTEHLA-7 score was 21 [ESTHELA-7] Feeling nervous, 
anxious or   
on edge? + 3 pt : Nearly every day, [ESTHELA-7] Feeling nervous, anxious or on edge?
+ 3   
pt : Nearly every day, [ESTHELA-7] Not being able to stop or control worrying? + 3 
pt :   
Nearly every day, [ESTHELA-7] Not being able to stop or control worrying? + 3 pt : 
Nearly   
every day, [ESTHELA-7] Worrying too much about different things? + 3 pt : Nearly 
every   
day, [ESTHELA-7] Trouble relaxing? + 3 pt : Nearly every day, [ESTHELA-7] Being so 
restless   
that it's hard to sit still? + 3 pt : Nearly every day, [ESTHELA-7] Becoming easily   
annoyed or irritable? + 3 pt : Nearly every day, [ESTHELA-7] Feeling afraid as if   
something awful might happen? + 3 pt : Nearly every day, Patient Health 
Questionnaire   
9-Item total score was 18 2024 If you checked off problems, how difficult 
is it   
for you to do your work? + : Very difficult, [PHQ-9-1] Little interest or 
pleasure in   
doing things? + 3 pt : Nearly every day, [PHQ-9-2] Feeling down, depressed, or   
hopeless? + 3 pt : Nearly every day, [PHQ-9-3] Trouble falling or staying asleep
 or   
sleeping too much? + 3 pt : Nearly every day, [PHQ-9-4] Feeling tired or having   
little energy? + 3 pt : Nearly every day, [PHQ-9-5] Poor appetite or overeating?
 + 3   
pt : Nearly every day, [PHQ-9-6] Feeling bad about yourself-or that you are a 
failure   
+ 0 pt : Not at all, [PHQ-9-7] Trouble concentrating on things such as reading 
the   
newspaper + 3 pt : Nearly every day, [PHQ-9-8] Moving or speaking so slowly that
   
other people have noticed. + 0 pt : Not at all, [PHQ-9-9] Thoughts that you 
would be   
better off dead or hurting yourself? + 0 pt : Not at all, and ESTHELA If you checked
 off   
problems, how difficult is it for you to do your work, take care of things, or 
get   
along? Very difficult.  
  
  
[facility]09- Progress note*   
  
Progress note  
  
  
  
                                Date            Encounter       Last Documented   
by  
   
                                2024      Medical Established Patient Last  
 documented on 10/04/2024; 1:56 PM,   
Doreen Cabrera CNP; [facility]  
  
  
  
                                                      
  
  
** Active Problems & Conditions **  
- F90.9 - Attention-deficit Hyperactivity Disorder  
- F31.31 - Bipolar I Disorder, Most Recent Episode, Depressed Mild  
- E11.9 - Diabetes Mellitus Type 2 Without Complication  
- N94.6 - Dysmenorrhea  
- F43.10 - Post-traumatic Stress Disorder  
  
** Chief Complaint **  
The Chief Complaint is: Patient reports her mental health is a problem. Patient 
isn't   
sure the zoloft is working for her. Patient said she is feeling very down. 
Patient is   
not sleeping, but has not picked up seroquel. Patient has been still taking the   
trazodone.  
  
** Referred Here **  
Not referred by urgent care clinic and not the emergency room. No prior 
encounters.  
- Data to be reviewed: no clinical lab tests  
  
** History of Present Illness **  
Linnette Beckham is a 24 year old female.  
- Allergy list reviewed - Reviewed Medications - Medication list reviewed  
- Date of last menstruation 2024 - Pregnancy test - would not like a 
pregnancy   
test  
Presents for med follow up , did not know seroquel was ready at the pharmacy so 
hasnt   
been taking . we do not have any hpv vaccines here today , needs 2nd dose. is 
going   
thru a vape about every 3 days , wants to quit but aware it would be better to 
wait   
until mental health is better , advised on the harm of nicotine / vapes  
  
** Current Medication **  
- Alcohol Pads 70% use to cleanse skin prior to checking blood sugars, 90 days, 
3   
refills  
- ARIPiprazole 5 MG Oral Tablet take 1 tablet by mouth once daily, 30 days, 5 
refills  
- Atorvastatin Calcium 20 MG Oral Tablet take 1 tablet by mouth once daily at   
bedtime, 30 days, 5 refills  
- Blood Glucose System Sriram Kit use to check sugars once daily (use what is 
covered),   
30 days, 0 refills  
- busPIRone HCl 10 MG Oral Tablet take 1 tablet by mouth twice daily (d/c 5 mg 
rx),   
30 days, 5 refills  
- CareSens Lancets Miscellaneous use to check sugars once daily (dispense what 
is   
covered), 90 days, 3 refills  
- Colace 100 MG Oral Capsule take 1 capsule by mouth once daily at bedtime as 
needed   
for constipation, 30 days, 5 refills  
- [employer] Iron 325 (65 Fe) MG Oral Tablet take 1 tablet by mouth once daily, 30 
days, 5   
refills  
- Intuniv 1 MG Oral Tablet Extended Release 24 Hour take 1 tablet by mouth once 
daily   
at bedtime, 30 days, 5 refills  
- Januvia 50 MG Oral Tablet take 1 tablet by mouth once daily, 30 days, 5 
refills  
- lamoTRIgine 100 MG Oral Tablet take 1 tablet by mouth once daily, 30 days, 5   
refills  
- Lisinopril 5 MG Oral Tablet take 1 tablet by mouth once daily, 30 days, 5 
refills  
- MiraLax 17 GM/SCOOP Oral Powder mix 1 scoop with 8 ounces once daily, 30 days,
 2   
refills  
- Narcan 4 MG/0.1ML Nasal Liquid spray in nostril for symptoms of overdose , may
   
repeat in 2 minutes if symptoms persist, 1 days, 0 refills  
- OneTouch Ultra In Vitro Strip USE ONE TEST STRIP TO CHECK BLOOD SUGARS ONCE 
DAILY,   
90 days, 3 refills  
- Prazosin HCl 1 MG Oral Capsule take 1 capsule by mouth twice daily, 30 days, 5
   
refills  
- SEROquel 50 MG Oral Tablet take 1 tablet by mouth once daily at bedtime (d/c   
trazodone), 30 days, 1 refills  
- Sertraline HCl 50 MG Oral Tablet take 1 tablet by mouth once daily, 30 days, 5
   
refills  
- Slynd 4 MG Oral Tablet take 1 tablet by mouth at the same time everyday to 
help   
regulate your period, 28 days, 2 refills  
  
** Past Medical/Surgical History **  
Reported:  
Has sex without a condom.  
Medical: No previous hospitalizations. Chronic illness and Sexually transmitted   
infection Partners sexually transmitted infection status known.  
Exposure: Exposure to COVID-19.  
Pregnancy: Previously pregnant 1 time(s) and para having 0 live birth(s). Not   
planning a pregnancy in the next year.  
Legal Documents: Consent form on file for procedure.  
Diagnoses:  
Polycystic Ovarian Syndrome (PCOS).  
Migraine headache.  
Psychiatric disorders biopolar disorder  
Anxiety disorder  
POTS.  
Procedural:  
- Insertion of ear pressure equalization tubes in both ears  
Surgical:  
- Tonsillectomy  
- Tonsillectomy with adenoidectomy  
  
** Social History **  
Environmental Exposure: Secondhand cigarette smoke exposure.  
Tobacco use: Using electronic cigarettes/vaping.  
Alcohol: Not using alcohol.  
Drug Use: Not using drugs denied by patient.  
Sexual: Sexually active, sexual orientation Lesbian or David, gender identity 
Other,   
and birth control is being practiced Pills.  
  
** Allergies **  
- Abilify Reaction: groggy  
- Augmentin Reaction: Shock  
- Metformin Reaction: Nausea, Vomiting  
- NO KNOWN ENVIRONMENTAL ALLERGIES  
- NO KNOWN FOOD ALLERGIES  
- Sertraline Reaction: slow/groggy  
- Trazodone Hydrochloride Reaction: Panic attack  
  
** Family History **  
Paternal:  
Systemic hypertension  
Oncologic disorder  
Maternal:  
Systemic hypertension  
Epilepsy and recurrent seizures  
Psychiatric disorders  
Oncologic disorder  
Fraternal:  
Psychiatric disorders  
  
** Review Of Systems **  
Head: No head symptoms.  
Neck: No neck symptoms.  
Eyes: No eye symptoms.  
Otolaryngeal: No ear symptoms, no nasal symptoms, no nose and sinus finding, no   
throat symptoms, no oral cavity symptoms, and no jaw symptoms.  
Breasts: No breast symptoms.  
Cardiovascular: No cardiovascular symptoms and no chest pain or discomfort.  
Pulmonary: No pulmonary symptoms.  
Gastrointestinal: No gastrointestinal symptoms.  
Genitourinary: No genitourinary symptoms.  
Musculoskeletal: No musculoskeletal symptoms.  
Neurological: No neurological symptoms.  
Psychological: No psychological symptoms.  
Skin: No skin symptoms.  
Cardiovascular: Edema not present.  
  
** Physical Findings **  
- Vitals taken 2024 04:27 pm  
BP-Sitting L135/86 mmHg  
BP Cuff SizeLarge  
Pulse Rate-Bbqcxpk279 bpm  
Respiration Rate18 per min  
Temp-Oral97.6 F  
Hrdwqh07 in  
Utrswj858 lbs  
Body Mass Index57.2 kg/m2  
Body Surface Area2.4 m2  
Oxygen Ljtsxbixqc07 %  
  
Vital Signs:  
- Systolic blood pressure 130 - 139 mmHg.  
- Diastolic blood pressure 80-89 mmHg.  
General Appearance:  
- Awake. - Alert. - Well developed. - Well nourished. - In no acute distress.  
Lungs:  
- Respiration rhythm and depth was normal. - Clear to auscultation.  
Cardiovascular:  
Heart Rate And Rhythm: - Normal.  
Heart Sounds: - Normal.  
Murmurs: - No murmurs were heard.  
Musculoskeletal System:  
General/bilateral: - Normal movement of all extremities.  
Foot:  
General/bilateral: - Normal 10-g monofilament exam of right foot. - Normal 10-g   
monofilament exam of left foot.  
Skin:  
- General appearance was normal.  
Physician's Services:  
- Diabetic Foot Exam Abnormal  
  
** Tests **  
Blood Analysis:  
Blood Endocrine Laboratory Tests: Value  
Blood glucose level by fingerstick Non-fasting 122 mg/dl  
Laboratory-based Chemistry:  
Other Laboratory Tests:  
Screening for sexually transmitted infections was not performed.  
Imaging:  
Ophthalmoscopy:  
Optomap completed Both eyes (OU) and with physician / healthcare professional   
interpretation and report.  
  
** Assessment **  
- Z68.43 - Body mass index [BMI] 50.0-59.9, adult  
- Z23 - Encounter for immunization  
- K02.9 - Dental caries, unspecified  
- E11.9 - Type 2 diabetes mellitus without complications  
- F31.31 - Bipolar disorder, current episode depressed, mild  
  
** Previous Tests **  
Laboratory Studies:  
Renal Function:  
Glomerular filtration rate 2024 >60.  
  
** Therapy **  
- Patient has agreed to receive flu vaccine today.  
- Silver diamine fluoride 38% application by physician or other QHP 10 Teeth.  
- Immunization administration, by injection, one vaccine.  
  
** Vaccinations **  
- Fluarix 0.5mL for 6 months and older Dose #2 Status: Administered Date: 
2024  
  
- Received dose of Fluarix 0.5mL (6 months and up)  
  
** Counseling/Education **  
- Wishing to stop using electronic cigarettes/vaping  
- Discussed nutritional needs teach healthy choices including fruits and 
vegetables  
- Patient education about a proper diet  
- Discussed concerns about exercise: promote physical activity  
  
** Plan **  
StartCited- Attention-deficit hyperactivity disorder, unspecified type  
Intuniv 1 MG tablet take 1 tablet by mouth once daily at bedtime, 30 days, 5 
refills  
EndCited  
StartCited- Other  
Follow-up Appointment  
1 month med check  
lamoTRIgine 100 MG tablet take 1 tablet by mouth once daily, 30 days, 5 refills  
Prazosin HCl 1 MG capsule take 1 capsule by mouth twice daily, 30 days, 5 
refills  
Sertraline HCl 50 MG tablet take 1 tablet by mouth once daily, 30 days, 5 
refills  
EndCited  
** Care Team **  
- Doreen Cabrera CNP  
  
** Health Reminders **  
- Assess BMI satisfied 2024.  
- Assess Tobacco Use satisfied 2024.  
- Eye Exam satisfied 2024.  
- Follow Up Plan BMI Management satisfied 2024.  
- Foot Exam satisfied 2024.  
  
** User Defined 1 **  
Not planning a pregnancy in the next year.  
  
** Bottom of Document **  
Illustration  
  
  
[facility]09- Progress note*   
  
Progress note  
  
  
  
                                Date            Encounter       Last Documented   
by  
   
                                2024      Medical Established Patient Last  
 documented on 10/09/2024; 2:09 PM,   
Doreen Cabrera CNP; [facility]  
  
  
  
                                                      
  
  
** Active Problems & Conditions **  
- F90.9 - Attention-deficit Hyperactivity Disorder  
- F31.31 - Bipolar I Disorder, Most Recent Episode, Depressed Mild  
- E11.9 - Diabetes Mellitus Type 2 Without Complication  
- N94.6 - Dysmenorrhea  
- F43.10 - Post-traumatic Stress Disorder  
  
** Chief Complaint **  
The Chief Complaint is: Patient reports her mental health is a problem. Patient 
isn't   
sure the zoloft is working for her. Patient said she is feeling very down. 
Patient is   
not sleeping, but has not picked up seroquel. Patient has been still taking the   
trazodone.  
  
** Referred Here **  
Not referred by urgent care clinic and not the emergency room. No prior 
encounters.  
- Data to be reviewed: no clinical lab tests  
  
** History of Present Illness **  
Linnette Beckham is a 24 year old female.  
- Allergy list reviewed - Reviewed Medications - Medication list reviewed  
- Date of last menstruation 2024 - Pregnancy test - would not like a 
pregnancy   
test  
Presents for med follow up , did not know seroquel was ready at the pharmacy so 
hasnt   
been taking . we do not have any hpv vaccines here today , needs 2nd dose. is 
going   
thru a vape about every 3 days , wants to quit but aware it would be better to 
wait   
until mental health is better , advised on the harm of nicotine / vapes  
  
** Current Medication **  
- Alcohol Pads 70% use to cleanse skin prior to checking blood sugars, 90 days, 
3   
refills  
- ARIPiprazole 5 MG Oral Tablet take 1 tablet by mouth once daily, 30 days, 5 
refills  
- Atorvastatin Calcium 20 MG Oral Tablet take 1 tablet by mouth once daily at   
bedtime, 30 days, 5 refills  
- Blood Glucose System Sriram Kit use to check sugars once daily (use what is 
covered),   
30 days, 0 refills  
- busPIRone HCl 10 MG Oral Tablet take 1 tablet by mouth twice daily (d/c 5 mg 
rx),   
30 days, 5 refills  
- CareSens Lancets Miscellaneous use to check sugars once daily (dispense what 
is   
covered), 90 days, 3 refills  
- Colace 100 MG Oral Capsule take 1 capsule by mouth once daily at bedtime as 
needed   
for constipation, 30 days, 5 refills  
- [facility] Iron 325 (65 Fe) MG Oral Tablet take 1 tablet by mouth once daily, 30 
days, 5   
refills  
- Intuniv 1 MG Oral Tablet Extended Release 24 Hour take 1 tablet by mouth once 
daily   
at bedtime, 30 days, 5 refills  
- Januvia 50 MG Oral Tablet take 1 tablet by mouth once daily, 30 days, 5 
refills  
- lamoTRIgine 100 MG Oral Tablet take 1 tablet by mouth once daily, 30 days, 5   
refills  
- Lisinopril 5 MG Oral Tablet take 1 tablet by mouth once daily, 30 days, 5 
refills  
- MiraLax 17 GM/SCOOP Oral Powder mix 1 scoop with 8 ounces once daily, 30 days,
 2   
refills  
- Narcan 4 MG/0.1ML Nasal Liquid spray in nostril for symptoms of overdose , may
   
repeat in 2 minutes if symptoms persist, 1 days, 0 refills  
- OneTouch Ultra In Vitro Strip USE ONE TEST STRIP TO CHECK BLOOD SUGARS ONCE 
DAILY,   
90 days, 3 refills  
- Prazosin HCl 1 MG Oral Capsule take 1 capsule by mouth twice daily, 30 days, 5
   
refills  
- SEROquel 50 MG Oral Tablet take 1 tablet by mouth once daily at bedtime (d/c   
trazodone), 30 days, 1 refills  
- Sertraline HCl 50 MG Oral Tablet take 1 tablet by mouth once daily, 30 days, 5
   
refills  
- Slynd 4 MG Oral Tablet take 1 tablet by mouth at the same time everyday to 
help   
regulate your period, 28 days, 2 refills  
  
** Past Medical/Surgical History **  
Reported:  
Has sex without a condom.  
Medical: No previous hospitalizations. Chronic illness and Sexually transmitted   
infection Partners sexually transmitted infection status known.  
Exposure: Exposure to COVID-19.  
Pregnancy: Previously pregnant 1 time(s) and para having 0 live birth(s). Not   
planning a pregnancy in the next year.  
Legal Documents: Consent form on file for procedure.  
Diagnoses:  
Polycystic Ovarian Syndrome (PCOS).  
Migraine headache.  
Psychiatric disorders biopolar disorder  
Anxiety disorder  
POTS.  
Procedural:  
- Insertion of ear pressure equalization tubes in both ears  
Surgical:  
- Tonsillectomy  
- Tonsillectomy with adenoidectomy  
  
** Social History **  
Environmental Exposure: Secondhand cigarette smoke exposure.  
Tobacco use: Using electronic cigarettes/vaping.  
Alcohol: Not using alcohol.  
Drug Use: Not using drugs denied by patient.  
Sexual: Sexually active, sexual orientation Lesbian or David, gender identity 
Other,   
and birth control is being practiced Pills.  
  
** Allergies **  
- Abilify Reaction: groggy  
- Augmentin Reaction: Shock  
- Metformin Reaction: Nausea, Vomiting  
- NO KNOWN ENVIRONMENTAL ALLERGIES  
- NO KNOWN FOOD ALLERGIES  
- Sertraline Reaction: slow/groggy  
- Trazodone Hydrochloride Reaction: Panic attack  
  
** Family History **  
Paternal:  
Systemic hypertension  
Oncologic disorder  
Maternal:  
Systemic hypertension  
Epilepsy and recurrent seizures  
Psychiatric disorders  
Oncologic disorder  
Fraternal:  
Psychiatric disorders  
  
** Review Of Systems **  
Head: No head symptoms.  
Neck: No neck symptoms.  
Eyes: No eye symptoms.  
Otolaryngeal: No ear symptoms, no nasal symptoms, no nose and sinus finding, no   
throat symptoms, no oral cavity symptoms, and no jaw symptoms.  
Breasts: No breast symptoms.  
Cardiovascular: No cardiovascular symptoms and no chest pain or discomfort.  
Pulmonary: No pulmonary symptoms.  
Gastrointestinal: No gastrointestinal symptoms.  
Genitourinary: No genitourinary symptoms.  
Musculoskeletal: No musculoskeletal symptoms.  
Neurological: No neurological symptoms.  
Psychological: No psychological symptoms.  
Skin: No skin symptoms.  
Cardiovascular: Edema not present.  
  
** Physical Findings **  
- Vitals taken 2024 04:27 pm  
BP-Sitting L135/86 mmHg  
BP Cuff SizeLarge  
Pulse Rate-Sonhrlv998 bpm  
Respiration Rate18 per min  
Temp-Oral97.6 F  
Exthry92 in  
Jpuztk290 lbs  
Body Mass Index57.2 kg/m2  
Body Surface Area2.4 m2  
Oxygen Qdzcmppenq86 %  
  
Vital Signs:  
- Systolic blood pressure 130 - 139 mmHg.  
- Diastolic blood pressure 80-89 mmHg.  
General Appearance:  
- Awake. - Alert. - Well developed. - Well nourished. - In no acute distress.  
Lungs:  
- Respiration rhythm and depth was normal. - Clear to auscultation.  
Cardiovascular:  
Heart Rate And Rhythm: - Normal.  
Heart Sounds: - Normal.  
Murmurs: - No murmurs were heard.  
Musculoskeletal System:  
General/bilateral: - Normal movement of all extremities.  
Foot:  
General/bilateral: - Normal 10-g monofilament exam of right foot. - Normal 10-g   
monofilament exam of left foot.  
Skin:  
- General appearance was normal.  
Physician's Services:  
- Diabetic Foot Exam Abnormal  
  
** Tests **  
Blood Analysis:  
Blood Endocrine Laboratory Tests: Value  
Blood glucose level by fingerstick Non-fasting 122 mg/dl  
Laboratory-based Chemistry:  
Other Laboratory Tests:  
Screening for sexually transmitted infections was not performed.  
Imaging:  
Ophthalmoscopy:  
No apparent diabetic retinopathy.  
Optomap completed Both eyes (OU) and with physician / healthcare professional   
interpretation and report.  
  
** Assessment **  
- Z68.43 - Body mass index [BMI] 50.0-59.9, adult  
- Z23 - Encounter for immunization  
- K02.9 - Dental caries, unspecified  
- E11.9 - Type 2 diabetes mellitus without complications  
- F31.31 - Bipolar disorder, current episode depressed, mild  
  
** Previous Tests **  
Laboratory Studies:  
Renal Function:  
Glomerular filtration rate 2024 >60.  
  
** Therapy **  
- Patient has agreed to receive flu vaccine today.  
- Silver diamine fluoride 38% application by physician or other QHP 10 Teeth.  
- Immunization administration, by injection, one vaccine.  
  
** Vaccinations **  
- Fluarix 0.5mL for 6 months and older Dose #2 Status: Administered Date: 
2024  
  
- Received dose of Fluarix 0.5mL (6 months and up)  
  
** Counseling/Education **  
- Wishing to stop using electronic cigarettes/vaping  
- Discussed nutritional needs teach healthy choices including fruits and 
vegetables  
- Patient education about a proper diet  
- Discussed concerns about exercise: promote physical activity  
  
** Plan **  
StartCited- Attention-deficit hyperactivity disorder, unspecified type  
Intuniv 1 MG tablet take 1 tablet by mouth once daily at bedtime, 30 days, 5 
refills  
EndCited  
StartCited- Other  
Follow-up Appointment  
1 month med check  
lamoTRIgine 100 MG tablet take 1 tablet by mouth once daily, 30 days, 5 refills  
Prazosin HCl 1 MG capsule take 1 capsule by mouth twice daily, 30 days, 5 
refills  
Sertraline HCl 50 MG tablet take 1 tablet by mouth once daily, 30 days, 5 
refills  
EndCited  
** Care Team **  
- Doreen Cabrera CNP  
  
** Health Reminders **  
- Assess BMI satisfied 2024.  
- Assess Tobacco Use satisfied 2024.  
- Eye Exam satisfied 10/09/2024.  
- Follow Up Plan BMI Management satisfied 2024.  
- Foot Exam satisfied 2024.  
  
** User Defined 1 **  
Not planning a pregnancy in the next year.  
  
** Bottom of Document **  
[facility]  
  
  
[facility]09- Reason for referral (narrative)*   
  
                          Date         Encounter Description Provider     Reason  
 for Referral  
   
                          24      Established Patient Radha Young LUW R  
eferral To Mental Health Team  
   
                          22      Established Patient Haylie Aguilar GRIFFIN  
CC-S Referral To Mental Health  
 Team  
   
                          21     Medical New Patient Doreen Cabrera CNP Refe  
rral To Mental Health Team  
  
  
[facility]  
Work Phone: [phone]- Evaluation note  
  
Includes: Assessments for all patient encounters  
  
  
  
                                Findings        Encounter       Date  
   
                                                    Attention-deficit hyperactiv  
ity   
disorder                                 Established Patient with Leonie   
Short LISWS                             2024  
  
  
  
                                                    Last Documented On   
4 6:28PM ; [facility]  
  
  
  
                                                    Bipolar I disorder, most rec  
ent   
episode, depressed - mild                Established Patient with Leonie   
Short LISWS                             2024  
  
  
  
                                                    Last Documented On   
4 6:28PM ; [facility]  
  
  
  
                                        Post-traumatic stress disorder  Establ  
ished Patient with Leonie Short   
LISWS                                   2024  
  
  
  
                                                    Last Documented On   
4 6:28PM ; [facility]  
  
  
  
                                                    [E11.9 - Type 2 diabetes lobito  
litus   
without complications] type 2 diabetes   
mellitus                                Medical Established Patient with   
Doreen Cabrera CNP                        2024  
  
  
  
                                                    Last Documented On   
4 7:36PM ; [facility]  
  
  
  
                                                    [F41.1 - Generalized anxiety  
   
disorder] generalized anxiety   
disorder                                Medical Established Patient with   
Doreen Cabrera CNP                        2024  
  
  
  
                                                    Last Documented On   
4 7:36PM ; [facility]  
  
  
  
                                                    [I10 - Essential (primary)   
hypertension] essential hypertension    Medical Established Patient with   
Doreen Cabrera CNP                        2024  
  
  
  
                                                    Last Documented On   
4 7:36PM ; [facility]  
  
  
  
                                                    [N94.6 - Dysmenorrhea, unspe  
cified]   
dysmenorrhea                            Medical Established Patient with   
Doreen Cabrera CNP                        2024  
  
  
  
                                                    Last Documented On   
4 7:36PM ; [facility]  
  
  
  
                                                    [Z68.43 - Body mass index [B  
MI]   
50.0-59.9, adult] assessment of body   
mass index                              Medical Established Patient with   
Doreen Cabrera CNP                        2024  
  
  
  
                                                    Last Documented On   
4 7:36PM ; [facility]  
  
  
  
                                        Encounter for Immunization Medical Estab  
lished Patient with Doreen Cabrera   
CNP                                     2024  
  
  
  
                                                    Last Documented On   
4 7:36PM ; [facility]  
  
  
  
                                        Venipuncture was performed Medical Estab  
lished Patient with Doreen Cabrera   
CNP                                     2024  
  
  
  
                                                    Last Documented On   
4 7:36PM ; [facility]  
  
  
  
                                        Attention-deficit hyperactivity disorder  
  Established Patient with   
Leonie Short LISWS                     2024  
  
  
  
                                                    Last Documented On   
4 10:10AM ; [facility]  
  
  
  
                                                    Bipolar I disorder, most rec  
ent   
episode, depressed - mild                Established Patient with Leonie   
Short LISWS                             2024  
  
  
  
                                                    Last Documented On   
4 10:10AM ; [facility]  
  
  
  
                                        Post-traumatic stress disorder  Establ  
ished Patient with Leonie Short   
LISWS                                   2024  
  
  
  
                                                    Last Documented On   
4 10:10AM ; [facility]  
  
  
  
                                        [D64.9 - Anemia, unspecified] anemia Med  
ical Established Patient with   
Doreen Cabrera CNP                        2024  
  
  
  
                                                    Last Documented On   
4 6:51PM ; [facility]  
  
  
  
                                                    [Z68.43 - Body mass index [B  
MI]   
50.0-59.9, adult] assessment of body   
mass index                              Medical Established Patient with   
Doreen Cabrera CNP                        2024  
  
  
  
                                                    Last Documented On   
4 6:51PM ; [facility]  
  
  
  
                                                    Bipolar affective disorder,   
current   
episode depressed, mild                  Established Patient with Leonie   
Short LISWS                             2024  
  
  
  
                                                    Last Documented On   
4 5:16PM ; [facility]  
  
  
  
                                        Post-traumatic stress disorder  Establ  
ished Patient with Leonie Short   
LISWS                                   2024  
  
  
  
                                                    Last Documented On   
4 5:16PM ; [facility]  
  
  
  
                                                    Undifferentiated attention d  
eficit   
disorder                                 Established Patient with   
Leonie Short LISWS                     2024  
  
  
  
                                                    Last Documented On   
4 5:16PM ; [facility]  
  
  
  
                                        Visit for: screening for disorder BH Est  
ablished Patient with Leonie   
Short LISWS                             2024  
  
  
  
                                                    Last Documented On   
4 5:16PM ; [facility]  
  
  
  
                                                    [Z68.43 - Body mass index [B  
MI]   
50.0-59.9, adult] assessment of body   
mass index                              Medical Established Patient with   
Doreen Cabrera CNP                        2024  
  
  
  
                                                    Last Documented On   
4 7:50PM ; [facility]  
  
  
  
                                        Attention-deficit hyperactivity disorder  
 Medical Established Patient with   
Doreen Cabrera CNP                        2024  
  
  
  
                                                    Last Documented On   
4 7:50PM ; [facility]  
  
  
  
                                                    Diabetes Risk Test Score was  
 three   
score 3/27/2024                         Medical Established Patient with Doreen Cabrera CNP                              2024  
  
  
  
                                                    Last Documented On   
4 7:50PM ; [facility]  
  
  
  
                                        Visit for: screening for STD Medical Est  
ablished Patient with Doreen Cabrera   
CNP                                     2024  
  
  
  
                                                    Last Documented On   
4 7:50PM ; [facility]  
  
  
  
                                                    [Z68.32 - Body mass index [B  
MI]   
32.0-32.9, adult] assessment of body   
mass index                              Medical Established Patient with   
Doreen Cabrera CNP                        2023  
  
  
  
                                                    Last Documented On   
3 6:01PM ; [facility]  
  
  
  
                                        Borderline personality disorder Medical   
Established Patient with Doreen Cabrera CNP                              2023  
  
  
  
                                                    Last Documented On   
3 6:01PM ; [facility]  
  
  
  
                                        Screening for diabetes mellitus Medical   
Established Patient with Doreen Cabrera CNP                              2023  
  
  
  
                                                    Last Documented On   
3 6:01PM ; [facility]  
  
  
  
                                        Assessment of body mass index Medical Es  
tablished Patient with Doreen Cabrera CNP                              10/21/2022  
  
  
  
                                                    Last Documented On 10/21/202  
2 2:45PM ; [facility]  
  
  
  
                                                    Bipolar affective disorder,   
current   
episode manic                            Telebehavioral Health with Haylienicanor Aguilar LPCC-S                        2022  
  
  
  
                                                    Last Documented On   
2 3:22PM ; [facility]  
  
  
  
                                                    Bipolar affective disorder,   
current   
episode manic                            Established Patient with Haylienicanor Martinerson LPCC-S                        2022  
  
  
  
                                                    Last Documented On   
2 2:28PM ; [facility]  
  
  
  
                                                    Bipolar affective disorder,   
current   
episode depressed, severe with   
psychosis                                Telebehavioral Health with Haylienicanor Martinerson LPCC-S                        2022  
  
  
  
                                                    Last Documented On   
2 3:29PM ; [facility]  
  
  
  
                                                    Assessment of body mass inde  
x [Body   
mass index [BMI] 50.0-59.9, adult]      Open Access - Established with Julieth Pisano CNP                               2022  
  
  
  
                                                    Last Documented On   
2 9:36AM ; [facility]  
  
  
  
                                                    Bipolar I disorder, most rec  
ent   
episode, manic                          Open Access - Established with Julieth   
Aliya CNP                               2022  
  
  
  
                                                    Last Documented On   
2 9:36AM ; [facility]  
  
  
  
                                        Post-traumatic stress disorder BH Establ  
ished Patient with Haylienicanor MartinAguilar   
LPCC-S                                  2022  
  
  
  
                                                    Last Documented On   
2 3:20PM ; [facility]  
  
  
  
                                No cough        Medical Established Patient with  
 Doreen Deborah CNP 2022  
  
  
  
                                                    Last Documented On   
2 4:04PM ; [facility]  
  
  
  
                                                    Z68.43 - Body mass index [BM  
I]   
50.0-59.9, adult                        Medical Established Patient with   
Doreen Deborah CNP                        2022  
  
  
  
                                                    Last Documented On   
2 4:04PM ; [facility]  
  
  
  
                                                    Borderline personality disor  
danny Pt   
reported hx of sx/dx                     Established Patient with Haylienicanor Martinerson LPCC-S                        2022  
  
  
  
                                                    Last Documented On   
2 4:08PM ; [facility]  
  
  
  
                                                    Assessment of body mass inde  
x [Body   
mass index [BMI] 50.0-59.9, adult]      Open Access - Established with Julieth Pisano CNP                               2022  
  
  
  
                                                    Last Documented On   
2 7:41PM ; [facility]  
  
  
  
                                                    Diabetes Risk Test Score was  
 three   
score 2022                         Open Access - Established with Julieth   
Aliya CNP                               2022  
  
  
  
                                                    Last Documented On   
2 7:41PM ; [facility]  
  
  
  
                                                    Bipolar I disorder, most rec  
ent   
episode, manic                          BH Established Patient with Avis   
Felder LPCC-S                          2021  
  
  
  
                                                    Last Documented On   
1 1:35AM ; [facility]  
  
  
  
                                        Borderline personality disorder  Estab  
lished Patient with Avis   
Felder LPCC-S                          2021  
  
  
  
                                                    Last Documented On   
1 1:35AM ; [facility]  
  
  
  
                                        Post-traumatic stress disorder  Establ  
ished Patient with Avis Bandamons LPCC-S                          2021  
  
  
  
                                                    Last Documented On   
1 1:35AM ; [facility]  
  
  
  
                                                    Assessment of visit for: bhargavi myles for   
human immunodeficiency virus            Medical Established Patient with   
Doreen Deborah CNP                        2021  
  
  
  
                                                    Last Documented On   
1 2:56PM ; [facility]  
  
  
  
                                Nicotine dependence Medical Established Patient   
with Doreen Deborah CNP 2021  
  
  
  
                                                    Last Documented On   
1 2:56PM ; [facility]  
  
  
  
                                Tachycardia     Medical Established Patient with  
 Doreen Deborah CNP 2021  
  
  
  
                                                    Last Documented On   
1 2:56PM ; [facility]  
  
  
  
                                                    Z68.43 - Body mass index [BM  
I]   
50.0-59.9, adult                        Medical Established Patient with   
Doreen Deborah CNP                        2021  
  
  
  
                                                    Last Documented On   
1 2:56PM ; [facility]  
  
  
  
                                                    Bipolar I disorder, most rec  
ent   
episode, manic                           Established Patient with Leonie   
Short LISWS                             2021  
  
  
  
                                                    Last Documented On   
1 10:17AM ; [facility]  
  
  
  
                                                    Borderline personality disor  
danny per   
patient reported history                 Established Patient with Leonie   
Short LISWS                             2021  
  
  
  
                                                    Last Documented On   
1 10:17AM ; [facility]  
  
  
  
                                Nicotine dependence  Established Patient with   
Leonie Short LISWS 2021  
  
  
  
                                                    Last Documented On   
1 10:17AM ; [facility]  
  
  
  
                                        Post-traumatic stress disorder  Establ  
ished Patient with Leonie Short   
LISWS                                   2021  
  
  
  
                                                    Last Documented On   
1 10:17AM ; [facility]  
  
  
  
                                Body mass index Medical Established Patient with  
 Doreen Deborah CNP 2021  
  
  
  
                                                    Last Documented On   
1 5:23PM ; [facility]  
  
  
  
                                Morbid obesity  Medical Established Patient with  
 Doreen Deborah CNP 2021  
  
  
  
                                                    Last Documented On   
1 5:23PM ; [facility]  
  
  
  
                                        Nicotine dependence uncomplicated Medica  
l Established Patient with Doreen   
Deborah CNP                              2021  
  
  
  
                                                    Last Documented On   
1 5:23PM ; [facility]  
  
  
  
                                                    Z68.42 - Body mass index [BM  
I]   
45.0-49.9, adult                        Medical Established Patient with   
Doreenpaola Cabrera CNP                        2021  
  
  
  
                                                    Last Documented On   
1 5:23PM ; [facility]  
  
  
  
                                Episodic mood disorders On Call with Pamela Velezammy edwards CNP 2021  
  
  
  
                                                    Last Documented On   
1 7:49AM ; [facility]  
  
  
  
                                                    Mood disorders, NOS per tabatha  
ent   
reported history                         Established Patient with Leonie   
Short LISWS                             2021  
  
  
  
                                                    Last Documented On   
1 7:15PM ; [facility]  
  
  
  
                                        Post-traumatic stress disorder  Establ  
ished Patient with Leonie Short   
LISWS                                   2021  
  
  
  
                                                    Last Documented On   
1 7:15PM ; [facility]  
  
  
  
                                Morbid obesity  Medical Established Patient with  
 Doreen Deborah CNP 2021  
  
  
  
                                                    Last Documented On   
1 12:31PM ; [facility]  
  
  
  
                                Otitis externa  Medical Established Patient with  
 Doreen Deborah CNP 2021  
  
  
  
                                                    Last Documented On   
1 12:31PM ; [facility]  
  
  
  
                                                    Z68.42 - Body mass index [BM  
I]   
45.0-49.9, adult                        Medical Established Patient with   
Doreen Deborah CNP                        2021  
  
  
  
                                                    Last Documented On   
1 12:31PM ; [facility]  
  
  
  
                                        Post-traumatic stress disorder  Establ  
ished Patient with Leonie Short   
LISWS                                   06/10/2021  
  
  
  
                                                    Last Documented On 06/10/202  
1 10:05PM ; [facility]  
  
  
  
                                Morbid obesity  Medical Established Patient with  
 Doreen Deborah CNP 06/10/2021  
  
  
  
                                                    Last Documented On 06/10/202  
1 4:45PM ; [facility]  
  
  
  
                                                    Z68.42 - Body mass index [BM  
I]   
45.0-49.9, adult                        Medical Established Patient with   
Doreen Deborah CNP                        06/10/2021  
  
  
  
                                                    Last Documented On 06/10/202  
1 4:45PM ; [facility]  
  
  
  
                                        Post-traumatic stress disorder  Establ  
ished Patient with Leonie Short   
LISWS                                   2021  
  
  
  
                                                    Last Documented On   
1 11:59AM ; [facility]  
  
  
  
                                                    Assessment of visit for: scr  
eening for   
human immunodeficiency virus            Medical New Patient with Doreen Cabrera CNP                              2021  
  
  
  
                                                    Last Documented On   
1 4:06PM ; [facility]  
  
  
  
                                                    Diabetes Risk Test Score was  
 one score   
2021                               Medical New Patient with Doreen Cabrera CNP                              2021  
  
  
  
                                                    Last Documented On   
1 4:06PM ; [facility]  
  
  
  
                                Hypertension    Medical New Patient with Doreen esposito CNP 2021  
  
  
  
                                                    Last Documented On   
1 4:06PM ; [facility]  
  
  
  
                                Morbid obesity  Medical New Patient with Doreen esposito CNP 2021  
  
  
  
                                                    Last Documented On   
1 4:06PM ; [facility]  
  
  
  
                                Post-traumatic stress disorder Medical New Patie  
nt with Doreen Cabrera CNP   
2021  
  
  
  
                                                    Last Documented On   
1 4:06PM ; [facility]  
  
  
  
                                                    Z68.42 - Body mass index [BM  
I]   
45.0-49.9, adult                        Medical New Patient with Doreen Cabrera CNP                              2021  
  
  
  
                                                    Last Documented On   
1 4:06PM ; [facility]  
Work Phone: [phone]- Evaluation note  
  
Includes: Assessments for all patient encounters  
  
  
  
                                Findings        Encounter       Date  
   
                                                    Attention-deficit hyperactiv  
ity   
disorder                                 Established Patient with Leonie   
Short LISWS                             2024  
  
  
  
                                                    Last Documented On   
4 3:28PM ; [facility]  
  
  
  
                                                    Bipolar I disorder, most rec  
ent   
episode, depressed - mild                Established Patient with Leonie   
Short LISWS                             2024  
  
  
  
                                                    Last Documented On   
4 3:28PM ; [facility]  
  
  
  
                                        Post-traumatic stress disorder  Establ  
ished Patient with Leonie Short   
LISWS                                   2024  
  
  
  
                                                    Last Documented On   
4 3:28PM ; [facility]  
  
  
  
                                                    [E11.9 - Type 2 diabetes lobito  
litus   
without complications] type 2 diabetes   
mellitus                                Medical Established Patient with   
Doreen Cabrera CNP                        2024  
  
  
  
                                                    Last Documented On   
4 7:36PM ; [facility]  
  
  
  
                                                    [F41.1 - Generalized anxiety  
   
disorder] generalized anxiety   
disorder                                Medical Established Patient with   
Doreen Cabrera CNP                        2024  
  
  
  
                                                    Last Documented On   
4 7:36PM ; [facility]  
  
  
  
                                                    [I10 - Essential (primary)   
hypertension] essential hypertension    Medical Established Patient with   
Doreen Cabrera CNP                        2024  
  
  
  
                                                    Last Documented On   
4 7:36PM ; [facility]  
  
  
  
                                                    [N94.6 - Dysmenorrhea, unspe  
cified]   
dysmenorrhea                            Medical Established Patient with   
Doreen Cabrera CNP                        2024  
  
  
  
                                                    Last Documented On   
4 7:36PM ; [facility]  
  
  
  
                                                    [Z68.43 - Body mass index [B  
MI]   
50.0-59.9, adult] assessment of body   
mass index                              Medical Established Patient with   
Doreen Cabrera CNP                        2024  
  
  
  
                                                    Last Documented On   
4 7:36PM ; [facility]  
  
  
  
                                        Encounter for Immunization Medical Estab  
lished Patient with Doreen Cabrera   
CNP                                     2024  
  
  
  
                                                    Last Documented On   
4 7:36PM ; [facility]  
  
  
  
                                        Venipuncture was performed Medical Estab  
lished Patient with Doreen Cabrera   
CNP                                     2024  
  
  
  
                                                    Last Documented On   
4 7:36PM ; [facility]  
  
  
  
                                        Attention-deficit hyperactivity disorder  
  Established Patient with   
Leonie Short LISWS                     2024  
  
  
  
                                                    Last Documented On   
4 10:10AM ; [facility]  
  
  
  
                                                    Bipolar I disorder, most rec  
ent   
episode, depressed - mild               BH Established Patient with Leonie   
Short LISWS                             2024  
  
  
  
                                                    Last Documented On   
4 10:10AM ; [facility]  
  
  
  
                                        Post-traumatic stress disorder BH Establ  
ished Patient with Leonie Short   
LISWS                                   2024  
  
  
  
                                                    Last Documented On   
4 10:10AM ; [facility]  
  
  
  
                                        [D64.9 - Anemia, unspecified] anemia Med  
ical Established Patient with   
Doreen Cabrera CNP                        2024  
  
  
  
                                                    Last Documented On   
4 6:51PM ; [facility]  
  
  
  
                                                    [Z68.43 - Body mass index [B  
MI]   
50.0-59.9, adult] assessment of body   
mass index                              Medical Established Patient with   
Doreen Cabrera CNP                        2024  
  
  
  
                                                    Last Documented On   
4 6:51PM ; [facility]  
  
  
  
                                                    Bipolar affective disorder,   
current   
episode depressed, mild                 BH Established Patient with Leonie Garrett LISWS                             2024  
  
  
  
                                                    Last Documented On   
4 5:16PM ; [facility]  
  
  
  
                                        Post-traumatic stress disorder BH Establ  
ished Patient with Leonie Short   
LISWS                                   2024  
  
  
  
                                                    Last Documented On   
4 5:16PM ; [facility]  
  
  
  
                                                    Undifferentiated attention d  
eficit   
disorder                                BH Established Patient with   
Leonie Short LISWS                     2024  
  
  
  
                                                    Last Documented On   
4 5:16PM ; [facility]  
  
  
  
                                        Visit for: screening for disorder BH Est  
ablished Patient with Leonie Garrett LISWS                             2024  
  
  
  
                                                    Last Documented On   
4 5:16PM ; [facility]  
  
  
  
                                                    [Z68.43 - Body mass index [B  
MI]   
50.0-59.9, adult] assessment of body   
mass index                              Medical Established Patient with   
Doreen Cabrera CNP                        2024  
  
  
  
                                                    Last Documented On   
4 7:50PM ; [facility]  
  
  
  
                                        Attention-deficit hyperactivity disorder  
 Medical Established Patient with   
Doreen Cabrera CNP                        2024  
  
  
  
                                                    Last Documented On   
4 7:50PM ; [facility]  
  
  
  
                                                    Diabetes Risk Test Score was  
 three   
score 3/27/2024                         Medical Established Patient with Doreen Cabrera CNP                              2024  
  
  
  
                                                    Last Documented On   
4 7:50PM ; [facility]  
  
  
  
                                        Visit for: screening for STD Medical Est  
ablished Patient with Doreen Cabrera   
CNP                                     2024  
  
  
  
                                                    Last Documented On   
4 7:50PM ; [facility]  
  
  
  
                                                    [Z68.32 - Body mass index [B  
MI]   
32.0-32.9, adult] assessment of body   
mass index                              Medical Established Patient with   
Doreen Cabrera CNP                        2023  
  
  
  
                                                    Last Documented On   
3 6:01PM ; [facility]  
  
  
  
                                        Borderline personality disorder Medical   
Established Patient with Doreen Cabrera CNP                              2023  
  
  
  
                                                    Last Documented On   
3 6:01PM ; [facility]  
  
  
  
                                        Screening for diabetes mellitus Medical   
Established Patient with Doreen Cabrera CNP                              2023  
  
  
  
                                                    Last Documented On   
3 6:01PM ; [facility]  
  
  
  
                                        Assessment of body mass index Medical Es  
tablished Patient with Doreenpaola Cabrera CNP                              10/21/2022  
  
  
  
                                                    Last Documented On 10/21/202  
2 2:45PM ; [facility]  
  
  
  
                                                    Bipolar affective disorder,   
current   
episode manic                            Tele[facility] with Haylienicanor Aguilar LPCC-S                        2022  
  
  
  
                                                    Last Documented On   
2 3:22PM ; [facility]  
  
  
  
                                                    Bipolar affective disorder,   
current   
episode manic                           BH Established Patient with Haylie   
Aguilar LPCC-S                        2022  
  
  
  
                                                    Last Documented On   
2 2:28PM ; [facility]  
  
  
  
                                                    Bipolar affective disorder,   
current   
episode depressed, severe with   
psychosis                                Telebehavioral Health with Haylienicanor Aguilar LPCC-S                        2022  
  
  
  
                                                    Last Documented On   
2 3:29PM ; [facility]  
  
  
  
                                                    Assessment of body mass inde  
x [Body   
mass index [BMI] 50.0-59.9, adult]      Open Access - Established with Julieth Pisano CNP                               2022  
  
  
  
                                                    Last Documented On   
2 9:36AM ; [facility]  
  
  
  
                                                    Bipolar I disorder, most rec  
ent   
episode, manic                          Open Access - Established with Julieth Pisano CNP                               2022  
  
  
  
                                                    Last Documented On   
2 9:36AM ; [facility]  
  
  
  
                                        Post-traumatic stress disorder  Establ  
ished Patient with Haylienicanor Aguilar   
LPCC-S                                  2022  
  
  
  
                                                    Last Documented On   
2 3:20PM ; [facility]  
  
  
  
                                No cough        Medical Established Patient with  
 Doreenpaola Cabrera CNP 2022  
  
  
  
                                                    Last Documented On   
2 4:04PM ; [facility]  
  
  
  
                                                    Z68.43 - Body mass index [BM  
I]   
50.0-59.9, adult                        Medical Established Patient with   
Doreenpaola Mccormacken CNP                        2022  
  
  
  
                                                    Last Documented On   
2 4:04PM ; [facility]  
  
  
  
                                                    Borderline personality disor  
danny Pt   
reported hx of sx/dx                     Established Patient with Haylienicanor Aguilar LPCC-S                        2022  
  
  
  
                                                    Last Documented On   
2 4:08PM ; [facility]  
  
  
  
                                                    Assessment of body mass inde  
x [Body   
mass index [BMI] 50.0-59.9, adult]      Open Access - Established with Julieth Psiano CNP                               2022  
  
  
  
                                                    Last Documented On   
2 7:41PM ; [facility]  
  
  
  
                                                    Diabetes Risk Test Score was  
 three   
score 2022                         Open Access - Established with Julieth   
Aliya CNP                               2022  
  
  
  
                                                    Last Documented On   
2 7:41PM ; [facility]  
  
  
  
                                                    Bipolar I disorder, most rec  
ent   
episode, manic                           Established Patient with Avis   
Felder LPCC-S                          2021  
  
  
  
                                                    Last Documented On   
1 1:35AM ; [facility]  
  
  
  
                                        Borderline personality disorder  Estab  
lished Patient with Avis   
Feldre LPCC-S                          2021  
  
  
  
                                                    Last Documented On   
1 1:35AM ; [facility]  
  
  
  
                                        Post-traumatic stress disorder  Establ  
ished Patient with Avis   
Felder LPCC-S                          2021  
  
  
  
                                                    Last Documented On   
1 1:35AM ; [facility]  
  
  
  
                                                    Assessment of visit for: bhargavi myles for   
human immunodeficiency virus            Medical Established Patient with   
Doreen Deborah CNP                        2021  
  
  
  
                                                    Last Documented On   
1 2:56PM ; [facility]  
  
  
  
                                Nicotine dependence Medical Established Patient   
with Doreen Deborah CNP 2021  
  
  
  
                                                    Last Documented On   
1 2:56PM ; [facility]  
  
  
  
                                Tachycardia     Medical Established Patient with  
 Doreen Deborah CNP 2021  
  
  
  
                                                    Last Documented On   
1 2:56PM ; [facility]  
  
  
  
                                                    Z68.43 - Body mass index [BM  
I]   
50.0-59.9, adult                        Medical Established Patient with   
Doreen Deborah CNP                        2021  
  
  
  
                                                    Last Documented On   
1 2:56PM ; [facility]  
  
  
  
                                                    Bipolar I disorder, most rec  
ent   
episode, manic                           Established Patient with Leonie   
Short LISWS                             2021  
  
  
  
                                                    Last Documented On   
1 10:17AM ; [facility]  
  
  
  
                                                    Borderline personality disor  
danny per   
patient reported history                 Established Patient with Leonie   
Short LISWS                             2021  
  
  
  
                                                    Last Documented On   
1 10:17AM ; [facility]  
  
  
  
                                Nicotine dependence  Established Patient with   
Leonie Short LISWS 2021  
  
  
  
                                                    Last Documented On   
1 10:17AM ; [facility]  
  
  
  
                                        Post-traumatic stress disorder  Establ  
ished Patient with Leonie Short   
LISWS                                   2021  
  
  
  
                                                    Last Documented On   
1 10:17AM ; [facility]  
  
  
  
                                Body mass index Medical Established Patient with  
 Doreen Deborah CNP 2021  
  
  
  
                                                    Last Documented On   
1 5:23PM ; [facility]  
  
  
  
                                Morbid obesity  Medical Established Patient with  
 Doreen Deborah CNP 2021  
  
  
  
                                                    Last Documented On   
1 5:23PM ; [facility]  
  
  
  
                                        Nicotine dependence uncomplicated Medica  
l Established Patient with Doreen   
Deborah CNP                              2021  
  
  
  
                                                    Last Documented On   
1 5:23PM ; [facility]  
  
  
  
                                                    Z68.42 - Body mass index [BM  
I]   
45.0-49.9, adult                        Medical Established Patient with   
Doreen Deborah CNP                        2021  
  
  
  
                                                    Last Documented On   
1 5:23PM ; [facility]  
  
  
  
                                Episodic mood disorders On Call with Pamela edwards CNP 2021  
  
  
  
                                                    Last Documented On   
1 7:49AM ; [facility]  
  
  
  
                                                    Mood disorders, NOS per tabatha  
ent   
reported history                         Established Patient with Leonie   
Short LISWS                             2021  
  
  
  
                                                    Last Documented On   
1 7:15PM ; [facility]  
  
  
  
                                        Post-traumatic stress disorder  Establ  
ished Patient with Leonie Short   
LISWS                                   2021  
  
  
  
                                                    Last Documented On   
1 7:15PM ; [facility]  
  
  
  
                                Morbid obesity  Medical Established Patient with  
 Doreen Deborah CNP 2021  
  
  
  
                                                    Last Documented On   
1 12:31PM ; [facility]  
  
  
  
                                Otitis externa  Medical Established Patient with  
 Doreen Deborah CNP 2021  
  
  
  
                                                    Last Documented On   
1 12:31PM ; [facility]  
  
  
  
                                                    Z68.42 - Body mass index [BM  
I]   
45.0-49.9, adult                        Medical Established Patient with   
Doreen Deborah CNP                        2021  
  
  
  
                                                    Last Documented On   
1 12:31PM ; [facility]  
  
  
  
                                        Post-traumatic stress disorder  Establ  
ished Patient with Leonie Short   
LISWS                                   06/10/2021  
  
  
  
                                                    Last Documented On 06/10/202  
1 10:05PM ; [facility]  
  
  
  
                                Morbid obesity  Medical Established Patient with  
 Doreenpaola Cabrera CNP 06/10/2021  
  
  
  
                                                    Last Documented On 06/10/202  
1 4:45PM ; [facility]  
  
  
  
                                                    Z68.42 - Body mass index [BM  
I]   
45.0-49.9, adult                        Medical Established Patient with   
Doreen Deborah CNP                        06/10/2021  
  
  
  
                                                    Last Documented On 06/10/202  
1 4:45PM ; [facility]  
  
  
  
                                        Post-traumatic stress disorder BH Establ  
ished Patient with Leonie Short   
LISWS                                   2021  
  
  
  
                                                    Last Documented On   
1 11:59AM ; [facility]  
  
  
  
                                                    Assessment of visit for: bhargavi myles for   
human immunodeficiency virus            Medical New Patient with Doreenpaola Cabrera CNP                              2021  
  
  
  
                                                    Last Documented On   
1 4:06PM ; [facility]  
  
  
  
                                                    Diabetes Risk Test Score was  
 one score   
2021                               Medical New Patient with Doreenpaola Cabrera CNP                              2021  
  
  
  
                                                    Last Documented On   
1 4:06PM ; [facility]  
  
  
  
                                Hypertension    Medical New Patient with Doreen C  
cate CNP 2021  
  
  
  
                                                    Last Documented On   
1 4:06PM ; [facility]  
  
  
  
                                Morbid obesity  Medical New Patient with Doreen C  
cate CNP 2021  
  
  
  
                                                    Last Documented On   
1 4:06PM ; [facility]  
  
  
  
                                Post-traumatic stress disorder Medical New Patie  
nt with Doreen Deborah CNP   
2021  
  
  
  
                                                    Last Documented On   
1 4:06PM ; [facility]  
  
  
  
                                                    Z68.42 - Body mass index [BM  
I]   
45.0-49.9, adult                        Medical New Patient with Doreen   
Deborah CNP                              2021  
  
  
  
                                                    Last Documented On   
1 4:06PM ; [facility]  
Work Phone: [phone]- Progress note*   
  
Progress note  
  
  
  
                                Date            Encounter       Last Documented   
by  
   
                                2024      Medical Established Patient Last  
 documented on 2024; 7:36 PM,   
Doreen Cabrera CNP; [facility]  
  
  
  
                                                      
  
  
** Active Problems & Conditions **  
- F90.9 - Attention-deficit Hyperactivity Disorder  
- F31.31 - Bipolar I Disorder, Most Recent Episode, Depressed Mild  
- E11.9 - Diabetes Mellitus Type 2 Without Complication  
- N94.6 - Dysmenorrhea  
- F43.10 - Post-traumatic Stress Disorder  
  
** Chief Complaint **  
The Chief Complaint is: F/u on mental health. Patient stopped metformin and has   
gained weight. Patient has appointment scheduled with Dr. Patterson. Patient has 
been   
menstruating since stopping the metformin 4-5 months. Does not think buspirone 
is   
working because of increased anxiety.  
Patient did not get blood work completed.  
  
** Referred Here **  
Not referred by urgent care clinic and not the emergency room. No prior 
encounters.  
- Data to be reviewed: no clinical lab tests  
  
** History of Present Illness **  
Linnette Beckham is a 24 year old female.  
- Allergy list reviewed - Reviewed Medications - Medication list reviewed  
- Date of last menstruation patient unsure of date - Pregnancy test - would not 
like   
a pregnancy test  
Presents with sig other , anxiety is  not good  and has been bleeding ever since
   
stopping metformin r/t intolerance 3 months ago , has appt with gyn but not til   
october agreeable to progesterone ocp to help get bleeding to stop  
  
** Current Medication **  
- ARIPiprazole 5 MG Oral Tablet take 1 tablet by mouth once daily, 30 days, 5 
refills  
- Intuniv 1 MG Oral Tablet Extended Release 24 Hour take 1 tablet by mouth once 
daily   
at bedtime, 30 days, 5 refills  
- lamoTRIgine 100 MG Oral Tablet take 1 tablet by mouth once daily, 30 days, 5   
refills  
- MiraLax 17 GM/SCOOP Oral Powder mix 1 scoop with 8 ounces once daily, 30 days,
 2   
refills  
- Narcan 4 MG/0.1ML Nasal Liquid spray in nostril for symptoms of overdose , may
   
repeat in 2 minutes if symptoms persist, 1 days, 0 refills  
- Prazosin HCl 1 MG Oral Capsule take 1 capsule by mouth twice daily, 30 days, 5
   
refills  
- Sertraline HCl 50 MG Oral Tablet take 1 tablet by mouth once daily, 30 days, 5
   
refills  
- traZODone HCl 100 MG Oral Tablet take 1 tablet by mouth twice daily, 30 days, 
5   
refills  
  
** Past Medical/Surgical History **  
Reported:  
Has sex without a condom.  
Medical: No previous hospitalizations. Chronic illness and Sexually transmitted   
infection Partners sexually transmitted infection status known.  
Immunization History: Recent immunization for flu.  
Exposure: Exposure to COVID-19.  
Pregnancy: Previously pregnant 1 time(s) and para having 0 live birth(s). Not   
planning a pregnancy in the next year.  
Diagnoses:  
Polycystic Ovarian Syndrome (PCOS).  
Migraine headache.  
Psychiatric disorders biopolar disorder  
Anxiety disorder  
POTS.  
Procedural:  
- Insertion of ear pressure equalization tubes in both ears  
Surgical:  
- Tonsillectomy  
- Tonsillectomy with adenoidectomy  
  
** Social History **  
Environmental Exposure: Secondhand cigarette smoke exposure.  
Behavioral: Not a current tobacco user.  
Tobacco use: Using electronic cigarettes/vaping.  
Alcohol: A social drinker.  
Drug Use: Not using drugs denied by patient.  
Sexual: Sexually active age of sexual partner is _____ years old 21, sexual   
orientation Lesbian or David, gender identity Other, and birth control is being   
practiced Not Using - In Same Sex Relationship.  
  
** Allergies **  
- Abilify Reaction: groggy  
- Augmentin Reaction: Shock  
- Metformin Reaction: Nausea, Vomiting  
- NO KNOWN ENVIRONMENTAL ALLERGIES  
- NO KNOWN FOOD ALLERGIES  
- Sertraline Reaction: slow/groggy  
- Trazodone Hydrochloride Reaction: Panic attack  
  
** Family History **  
Paternal:  
Systemic hypertension  
Oncologic disorder  
Maternal:  
Systemic hypertension  
Epilepsy and recurrent seizures  
Psychiatric disorders  
Oncologic disorder  
Fraternal:  
Psychiatric disorders  
  
** Review Of Systems **  
Head: No head symptoms.  
Neck: No neck symptoms.  
Eyes: No eye symptoms.  
Otolaryngeal: No ear symptoms, no nasal symptoms, no nose and sinus finding, no   
throat symptoms, no oral cavity symptoms, and no jaw symptoms.  
Breasts: No breast symptoms.  
Cardiovascular: No cardiovascular symptoms and no chest pain or discomfort.  
Pulmonary: No pulmonary symptoms.  
Gastrointestinal: No gastrointestinal symptoms.  
Genitourinary: No genitourinary symptoms. Vaginal discharge has been on period x
 3   
months , not heavy and no clotting.  
Musculoskeletal: No musculoskeletal symptoms.  
Neurological: No neurological symptoms.  
Psychological: Anxiety, depression, and sleep.  
Skin: No skin symptoms.  
Cardiovascular: Edema not present.  
  
** Physical Findings **  
- Vitals taken 2024 04:45 pm  
BP-Sitting R151/98 mmHg  
BP Cuff SizeLarge  
Pulse Rate-Lrszamu031 bpm  
Ylgwux52 in  
Zjvrrd090 lbs 9.6 oz  
Body Mass Index57.9 kg/m2  
Body Surface Area2.4 m2  
Oxygen Lkfeyffdsv62 %  
  
- Vitals taken 2024 05:21 pm  
BP-Sitting R154/86 mmHg  
BP Cuff SizeLarge  
  
- Vitals taken 2024 05:46 pm  
manual large  
BP-Sitting R144/84 mmHg  
BP Cuff SizeLarge  
  
Vital Signs:  
- Systolic Blood Pressure > or = 140 mmHg.  
- Diastolic blood pressure 80-89 mmHg.  
General Appearance:  
- Awake. - Alert. - Well developed. - Well nourished. - In no acute distress.  
Lungs:  
- Respiration rhythm and depth was normal. - Clear to auscultation.  
Cardiovascular:  
Heart Rate And Rhythm: - Normal.  
Heart Sounds: - Normal.  
Murmurs: - No murmurs were heard.  
Abdomen:  
Visual Inspection: - Abdomen was normal on visual inspection.  
Musculoskeletal System:  
General/bilateral: - Normal movement of all extremities.  
Skin:  
- General appearance was normal.  
  
** Tests **  
Blood Analysis:  
Hemoglobin Studies: ValueDate  
Blood hemoglobin A1c 7.1%2024  
Blood Endocrine Laboratory Tests: Value  
Blood glucose level by fingerstick Non-fasting 112 mg/dl  
Laboratory-based Chemistry:  
Other Laboratory Tests:  
Screening for sexually transmitted infections was not performed.  
Laboratory Studies:  
Vascular Procedures:  
Right Antecubital Space.  
Left Antecubital Space.  
  
** Assessment **  
- Z68.43 - Body mass index [BMI] 50.0-59.9, adult  
- Z01.812 - Encounter for preprocedural laboratory examination  
- Z23 - Encounter for immunization  
- I10 - Essential (primary) hypertension  
- N94.6 - Dysmenorrhea, unspecified  
- E11.9 - Type 2 diabetes mellitus without complications  
- F41.1 - Generalized anxiety disorder  
  
** Therapy **  
- Immunization administration age 18 or younger, with counseling, first / only   
vaccine component and age 18 or younger, with counseling, each additional 
vaccine   
component.  
  
** Vaccinations **  
- HPV-Gardasil 9 Dose #1 Status: Administered Date: 2024  
- PCV (Prevnar-20) Dose #1 Status: Administered Date: 2024  
  
- Received dose of human papilloma virus vaccine  
- Received dose of pneumococcal conjugate vaccine, 20-valent, IM use  
  
** Counseling/Education **  
- No not wishing to stop using electronic cigarettes/vaping  
- Discussed nutritional needs teach healthy choices including fruits and 
vegetables  
- Patient education about a proper diet  
- Referred Patient to a Diabetes Self-Management Program  
- Discussed concerns about exercise: promote physical activity  
  
** Plan **  
StartCited- Dysmenorrhea, unspecified  
Outside Diagn Tests/Ultrasound: US Transvaginal ([id number])  
Instructions: [place]  
Slynd 4 MG tablet take 1 tablet by mouth at the same time everyday to help 
regulate   
your period, 28 days, 2 refills  
EndCited  
StartCited- Generalized anxiety disorder  
busPIRone HCl 10 MG tablet take 1 tablet by mouth twice daily (d/c 5 mg rx), 30 
days,   
5 refills  
EndCited  
StartCited- Other  
Follow-up Appointment  
1 month med f/u and 2nd hpv  
EndCited  
StartCited- Type 2 diabetes mellitus without complications  
Januvia 50 MG tablet take 1 tablet by mouth once daily, 30 days, 5 refills  
Lisinopril 5 MG tablet take 1 tablet by mouth once daily, 30 days, 5 refills  
Atorvastatin Calcium 20 MG tablet take 1 tablet by mouth once daily at bedtime, 
30   
days, 5 refills  
Blood Glucose System Sriram Kit use to check sugars once daily (use what is 
covered), 30   
days, 0 refills  
Blood Glucose Test strip use to cehck sugars (dispense what is covered), 90 
days, 3   
refills  
Alcohol Pads 70% pad use to cleanse skin prior to checking blood sugars, 90 
days, 3   
refills  
CareSens Lancets each use to check sugars once daily (dispense what is covered),
 90   
days, 3 refills  
EndCited  
Modify diet , limit starchy carbs such as breads / pastas / sweets.  
  
** Other **  
- Patient tolerated venipuncture well; - Number of attempts for venipuncture two  
  
** Practice Management **  
Hemoglobin A1c level >=7.0 and < 8.0%.  
  
** Care Team **  
- Doreen Cabrera CNP  
  
** Health Reminders **  
- Assess BMI satisfied 2024.  
- Assess Tobacco Use satisfied 2024.  
- Follow Up Plan BMI Management satisfied 2024.  
- Hemoglobin A1c satisfied 2024.  
- Statin Therapy Needed satisfied 2024.  
  
** User Defined 1 **  
Not planning a pregnancy in the next year.  
  
  
[facility]08- Progress note*   
  
Progress note  
  
  
  
                                Date            Encounter       Last Documented   
by  
   
                                2024       Established Patient Last docu  
mented on 2024; 3:28 PM,   
Leonie HUTCHINSON; [facility]  
  
  
  
                                                      
  
  
** Active Problems & Conditions **  
- F90.9 - Attention-deficit Hyperactivity Disorder  
- F31.31 - Bipolar I Disorder, Most Recent Episode, Depressed Mild  
- E11.9 - Diabetes Mellitus Type 2 Without Complication  
- N94.6 - Dysmenorrhea  
- F43.10 - Post-traumatic Stress Disorder  
  
** Chief Complaint **  
The Chief Complaint is: P met with patient to follow-up regarding mood and   
medications. Patient had requested to meet with a different [facility] in office but 
was not   
avialable and agreeable to meet with this  provider. Patient reports noticing 
that   
anxiety has continued to feel increased recently. Patient reports continued 
stressors   
related to physical health. Patient has scheduled with specialists, but 
appointment   
is not for several weeks still. Patient reports that Buspar is not helpful with   
increased anxiety. Patient reports experiencing nightmares and vivid dreams 
again and   
waking up with increased anxiety. Patient reports no thoughts of harm toward 
self or   
others.  
  
** History of Present Illness **  
Linnette Beckham is a 24 year old female.  
- Appetite not normal - Irritability  
- Anxiety - Nightmares - Energy level is fair - Feeling guilty - Easily 
distracted -   
Racing thoughts - No depression - No loss of interest in activities - No social   
isolation  
  
** Current Medication **  
- Alcohol Pads 70% use to cleanse skin prior to checking blood sugars, 90 days, 
3   
refills  
- ARIPiprazole 5 MG Oral Tablet take 1 tablet by mouth once daily, 30 days, 5 
refills  
- Atorvastatin Calcium 20 MG Oral Tablet take 1 tablet by mouth once daily at   
bedtime, 30 days, 5 refills  
- Blood Glucose System Sriram Kit use to check sugars once daily (use what is 
covered),   
30 days, 0 refills  
- Blood Glucose Test In Vitro Strip use to cehck sugars (dispense what is 
covered),   
90 days, 3 refills  
- busPIRone HCl 10 MG Oral Tablet take 1 tablet by mouth twice daily (d/c 5 mg 
rx),   
30 days, 5 refills  
- CareSens Lancets Miscellaneous use to check sugars once daily (dispense what 
is   
covered), 90 days, 3 refills  
- Intuniv 1 MG Oral Tablet Extended Release 24 Hour take 1 tablet by mouth once 
daily   
at bedtime, 30 days, 5 refills  
- Januvia 50 MG Oral Tablet take 1 tablet by mouth once daily, 30 days, 5 
refills  
- lamoTRIgine 100 MG Oral Tablet take 1 tablet by mouth once daily, 30 days, 5   
refills  
- Lisinopril 5 MG Oral Tablet take 1 tablet by mouth once daily, 30 days, 5 
refills  
- MiraLax 17 GM/SCOOP Oral Powder mix 1 scoop with 8 ounces once daily, 30 days,
 2   
refills  
- Narcan 4 MG/0.1ML Nasal Liquid spray in nostril for symptoms of overdose , may
   
repeat in 2 minutes if symptoms persist, 1 days, 0 refills  
- Prazosin HCl 1 MG Oral Capsule take 1 capsule by mouth twice daily, 30 days, 5
   
refills  
- Sertraline HCl 50 MG Oral Tablet take 1 tablet by mouth once daily, 30 days, 5
   
refills  
- Slynd 4 MG Oral Tablet take 1 tablet by mouth at the same time everyday to 
help   
regulate your period, 28 days, 2 refills  
- traZODone HCl 100 MG Oral Tablet take 1 tablet by mouth twice daily, 30 days, 
5   
refills  
  
** Social History **  
Medical: Chronic illness.  
Environmental Exposure: No secondhand cigarette smoke exposure.  
Personal: Recent emotional stress.  
Behavioral: Not a current tobacco user.  
Tobacco use: Using electronic cigarettes/vaping.  
Alcohol: Not using alcohol.  
Drug Use: Not using drugs.  
  
** Physical Findings **  
General Appearance:  
- Normal Appearance.  
Neurological:  
- Estimated intelligence was normal. - Oriented to time, place, and person. - No
   
hallucinations. - Judgement was not impaired.  
Speech: - Is Normal.  
Psychiatric:  
- Mood was anxious. - Mood was concerned. - Attitude Open.  
Demonstrated Behavior: - Motor Activity Normal Activity. - Eye Contact 
Appropriate.  
Affect: - Congruent with the mood.  
Thought Content: - Insight was intact. - No delusions. - No suicidal ideation. -
 No   
Passive thoughts of Death. - No suicidal plans. - No suicidal intent. - No 
homicidal   
ideations. - No homicidal plans. - No homicidal intent.  
Past Medical:  
- No repetitive self injurious behavior.  
  
** Assessment **  
- F90.9 - Attention-deficit hyperactivity disorder, unspecified type  
- F31.31 - Bipolar disorder, current episode depressed, mild  
- F43.10 - Post-traumatic stress disorder, unspecified  
  
** Therapy **  
- Brief solution-focused therapy.  
- Adherent with medications.  
-  Visit 30 Minutes.  
- Plan - PCP to have patient increase Buspar to 10mg twice daily and continue   
Prazosin 1mg in the morning and increase to 2 mg at bedtime. Patient agreeable 
to   
plan and Collaborated with patient and provider:  
  
** Counseling/Education **  
P offered active and supportive listening and processed recent stressors.  
[facility] discussed coping skills and supports to implement in managing increased 
anxiety   
such as tools learned previously in therapy.  
[facility] discussed sleep hygiene strategies that can be implemented.  
[facility] encouraged patient to follow-up with specialists as scheduled.  
  
** Plan **  
P to task other  provider to follow-up with patient regarding medication 
changes.  
  
** Care Team **  
- Doreen Cabrera CNP  
  
** Health Reminders **  
- Assess Tobacco Use satisfied 2024.  
  
  
[facility]06- Instructions  
  
Includes: Instructions for all patient encounters  
  
  
  
                                                    Education and Decision Aids   
were provided during visit for:  
   
                                                    Discussed nutritional needs   
teach healthy choices including fruits and   
vegetables  
   
                                                    Last Documented On   
4 4:46PM ; [facility]  
   
                                                    Patient education about a pr  
oper diet  
   
                                                    Last Documented On   
4 4:46PM ; [facility]  
   
                                                    Discussed concerns about exe  
rcise : promote physical activity  
   
                                                    Last Documented On   
4 4:46PM ; [facility]  
   
                                                    Not requesting contraception  
   
                                                    Last Documented On   
4 4:46PM ; [facility] offered active and suppo  
rtive listening and processed current stressors   
related   
to getting medications. ~P discussed coping skills and supports to implement 
in   
daily routine. ~[facility] discussed progress patient has felt they have made recently 
and   
encouraged continued follow-up with providers to address health  
   
                                                    Last Documented On   
4 5:16PM ; [facility]  
   
                                                    Discussed nutritional needs   
teach healthy choices including fruits and   
vegetables  
   
                                                    Last Documented On   
4 7:14PM ; [facility]  
   
                                                    Patient education about a pr  
oper diet  
   
                                                    Last Documented On   
4 7:14PM ; [facility]  
   
                                                    Discussed concerns about exe  
rcise : promote physical activity  
   
                                                    Last Documented On   
4 7:14PM ; [facility]  
   
                                                    Not requesting contraception  
   
                                                    Last Documented On   
4 7:14PM ; [facility]  
   
                                                    Discussed nutritional needs   
teach healthy choices including fruits and   
vegetables  
   
                                                    Last Documented On   
3 5:15PM ; [facility]  
   
                                                    Patient education about a pr  
oper diet  
   
                                                    Last Documented On   
3 5:15PM ; [facility]  
   
                                                    Discussed concerns about exe  
rcise : promote physical activity  
   
                                                    Last Documented On   
3 5:15PM ; [facility]  
   
                                                    Discussed nutritional needs   
teach healthy choices including fruits and   
vegetables  
   
                                                    Last Documented On 10/21/202  
2 1:42PM ; [facility]  
   
                                                    Patient education about a pr  
oper diet  
   
                                                    Last Documented On 10/21/202  
2 1:42PM ; [facility]  
   
                                                    Discussed concerns about exe  
rcise : promote physical activity  
   
                                                    Last Documented On 10/21/202  
2 1:42PM ; [facility]  
 and supportive feedback; normalized emotions and   
feelings, also provided pt time to process any current stressors. ~Promoted and   
encouraged follow-through with scheduling psychiatric services  
   
                                                    Last Documented On   
2 3:21PM ; [facility] provided supportive, empa  
thic listening and reflective feedback. ~Explored,   
encouraged, and supported the pt to discuss current sx/mood, assess risk for   
harm/need, coping mechanisms, support network and safety planning. ~Supported 
pt's   
plan to f/up with Dr. Alonso, as planned at [place], [place]. ~Encouraged 
pt to   
use safety plan, if needed to ensure she remains safe  
   
                                                    Last Documented On   
2 2:27PM ; [facility]  
   
                                                    Discussed nutritional needs   
teach healthy choices including fruits and   
vegetables  
   
                                                    Last Documented On   
2 3:20PM ; [facility]  
   
                                                    Patient education about a pr  
oper diet  
   
                                                    Last Documented On   
2 3:20PM ; [facility]  
   
                                                    Discussed concerns about exe  
rcise : promote physical activity ~ ~Will restart   
trazodone and prazosin ~ ~Patient is planning to have brother stay with her for 
a few   
days for emotional support ~ ~Follow up with PCP at next scheduled visit ~ ~Call
   
psychiatrist office to schedule appt ~ ~Call counselor  
   
                                                    Last Documented On   
2 9:35AM ; [facility]  
   
                                                    Provided supportive listenin  
g and empathic feedback; encouraged, explored, and   
supported the pt as she processed current symptoms, Issues, and concerns. 
~Discussed   
past tx and explored current needs/options. Acknowledged and validated pt's 
thoughts   
and emotions. ~Explored coping mechanisms and support network; utilized 
opportunity   
for safety planning; promoted seeking positive support and seeking help, as 
needed  
   
                                                    Last Documented On   
2 3:28PM ; [facility] provided active listenin  
g, support and helped pt process though current   
symptoms   
and stressor(s). Discussed and explored past effectiveness of medication; 
identified   
objectives and future goals; promoted use of healthy coping mechanisms, and 
self-care   
practices  
   
                                                    Last Documented On   
2 3:19PM ; [facility]  
   
                                                    Reviewed side effects and Ri  
sks/Benefits analysis  
   
                                                    Last Documented On   
2 3:19PM ; [facility]  
   
                                                    Discussed nutritional needs   
teach healthy choices including fruits and   
vegetables  
   
                                                    Last Documented On   
2 3:15PM ; [facility]  
   
                                                    Patient education about a pr  
oper diet  
   
                                                    Last Documented On   
2 3:15PM ; [facility]  
   
                                                    Discussed concerns about exe  
rcise : promote physical activity  
   
                                                    Last Documented On   
2 3:15PM ; [facility] introduced pt to [facility] in  
tegrated model of care ~[facility] offered active listening  
 and   
supportive feedback; normalized emotions and feelings, also provided pt time to   
process any current stressors ~[facility] discussed potential benefits of counseling 
and   
supported re-engaging, as needed. ~[facility] encouraged pt to continue to make time to
   
implement self-care regimen and use coping methods, as needed  
   
                                                    Last Documented On   
2 4:07PM ; [facility]  
   
                                                    Discussed nutritional needs   
teach healthy choices including fruits and   
vegetables  
   
                                                    Last Documented On   
2 3:15PM ; [facility]  
   
                                                    Patient education about a pr  
oper diet  
   
                                                    Last Documented On   
2 3:15PM ; [facility]  
   
                                                    Inquiry and counseling about  
 medication administration and compliance  
   
                                                    Last Documented On   
2 7:37PM ; [facility]  
   
                                                    Discussed concerns about exe  
rcise : promote physical activity  
   
                                                    Last Documented On   
2 3:15PM ; [facility]  
   
                                                    Patient goals discussed  
   
                                                    Last Documented On   
2 7:37PM ; [facility]  
   
                                                    Ansewred pt's questions re B  
orderlline Personality D/O and Bipolar D/O raised by  
   
psychiatrist at [place]. ~Validated and normalized patient?s feelings while   
assisting to process recent events  
   
                                                    Last Documented On   
1 1:33AM ; [facility]  
   
                                                    Discussed nutritional needs   
teach healthy choices including fruits and   
vegetables  
   
                                                    Last Documented On   
1 2:04PM ; [facility]  
   
                                                    Patient education about a pr  
oper diet  
   
                                                    Last Documented On   
1 2:04PM ; [facility]  
   
                                                    Discussed concerns about exe  
rcise : promote physical activity  
   
                                                    Last Documented On   
1 2:04PM ; [facility] provided active listenin  
g, support and helped patient process through   
current   
symptoms and stressors with ongoing mental health concerns and medication 
changes.   
~P discussed coping skills and supports that patient is implementing.  
discussed   
implementing coping skills as discussed with counseling and attending weekly   
appointments as scheduled with counselor. Patient was encouraged to continue 
writing   
down concerns with medications and discuss with providers. ~P reminded patient
 of   
crisis resources should they be needed. Patient reports having crisis resources 
and   
could return to ER  
   
                                                    Last Documented On   
1 10:17AM ; [facility]  
   
                                                    Patient education about a pr  
oper diet  
   
                                                    Last Documented On   
1 5:17PM ; [facility]  
   
                                                    Patient education about meal  
 planning  
   
                                                    Last Documented On   
1 5:17PM ; [facility]  
   
                                                    Education about changing eat  
ing habits  
   
                                                    Last Documented On   
1 5:17PM ; [facility]  
   
                                                    Patient education about high  
 fiber diet  
   
                                                    Last Documented On   
1 5:17PM ; [facility]  
   
                                                    Patient education about low   
fat diet  
   
                                                    Last Documented On   
1 5:17PM ; [facility]  
   
                                                    Patient education about low   
cholesterol diet  
   
                                                    Last Documented On   
1 5:17PM ; [facility]  
   
                                                    Patient education about low   
carbohydrate diet  
   
                                                    Last Documented On   
1 5:17PM ; [facility]  
   
                                                    Patient education about high  
 protein diet  
   
                                                    Last Documented On   
1 5:17PM ; [facility] offered active and suppo  
rtive listening, normalized emotions and feelings,   
and   
processed current stressors. ~[facility] discussed resources for finding a counselor 
and   
provided list of local resources. ~[facility] discussed patients coping skills and 
supports   
and encouraged patient to continue to implement. ~[facility] reminded patient of crisis
   
resources should they be needed  
   
                                                    Last Documented On   
1 7:15PM ; [facility]  
   
                                                    Discussed nutritional needs   
teach healthy choices including fruits and   
vegetables  
   
                                                    Last Documented On   
1 11:38AM ; [facility]  
   
                                                    Patient education about a pr  
oper diet  
   
                                                    Last Documented On   
1 11:38AM ; [facility]  
   
                                                    Patient education about a pr  
oper diet  
   
                                                    Last Documented On   
1 12:19PM ; [facility]  
   
                                                    Patient education about meal  
 planning  
   
                                                    Last Documented On   
1 12:19PM ; [facility]  
   
                                                    Education about changing eat  
ing habits  
   
                                                    Last Documented On   
1 12:19PM ; [facility]  
   
                                                    Patient education about high  
 fiber diet  
   
                                                    Last Documented On  12:19PM ; [facility]  
   
                                                    Patient education about low   
fat diet  
   
                                                    Last Documented On   
1 12:19PM ; [facility]  
   
                                                    Patient education about low   
cholesterol diet  
   
                                                    Last Documented On   
1 12:19PM ; [facility]  
   
                                                    Patient education about low   
carbohydrate diet  
   
                                                    Last Documented On   
1 12:19PM ; [facility]  
   
                                                    Patient education about high  
 protein diet  
   
                                                    Last Documented On   
1 12:19PM ; [facility]  
   
                                                    Discussed concerns about exe  
rcise : promote physical activity  
   
                                                    Last Documented On   
1 11:38AM ; [facility] provided active listenin  
g, support and helped patient process through   
current   
symptoms and stressors related to family conflict. ~[facility] discussed coping skills 
and   
supports with patient that can be implemented and reminded patient of ways to 
access   
additional resources. ~[facility] discussed crisis resources and plan. Patient has 
crisis   
resources still available should they be needed  
   
                                                    Last Documented On 06/10/202  
1 7:04PM ; [facility]  
   
                                                    Discussed nutritional needs   
teach healthy choices including fruits and   
vegetables  
   
                                                    Last Documented On 06/10/202  
1 3:57PM ; [facility]  
   
                                                    Patient education about a pr  
oper diet  
   
                                                    Last Documented On 06/10/202  
1 3:57PM ; [facility]  
   
                                                    Discussed concerns about exe  
rcise : promote physical activity  
   
                                                    Last Documented On 06/10/202  
1 3:57PM ; [facility] of [facility]. [facility] and PCP reassured   
patient of not sharing information with anyone unless she has signed a release 
for us   
to do so. ~P provided active listening, support and helped patient process 
through   
current symptoms and stressors. ~P discussed establishing counseling and   
psychiatry. P discussed EMDR therapy and ways to find provider who does this 
type   
of therapy. ~[facility] discussed crisis resources should mood worsen, [facility] provided 
text   
hotline number for crisis. [facility] reviewed crisis plan with patient and patient is 
able   
to contact positive supports and family when feeling down  
   
                                                    Last Documented On   
1 11:46AM ; [facility]  
   
                                                    Discussed nutritional needs   
teach healthy choices including fruits and   
vegetables  
   
                                                    Last Documented On   
1 2:11PM ; [facility]  
   
                                                    Patient education about a pr  
oper diet  
   
                                                    Last Documented On   
1 2:11PM ; [facility]  
   
                                                    Discussed concerns about exe  
rcise : promote physical activity  
   
                                                    Last Documented On   
1 2:11PM ; [facility]  
Work Phone: [phone]- Evaluation note  
  
Includes: Assessments for all patient encounters  
  
  
  
                                Findings        Encounter       Date  
   
                                        [D64.9 - Anemia, unspecified] anemia Med  
ical Established Patient with Doreen Cabrera CNP                              2024  
  
  
  
                                                    Last Documented On   
4 6:51PM ; [facility]  
  
  
  
                                                    [Z68.43 - Body mass index [B  
MI]   
50.0-59.9, adult] assessment of body   
mass index                              Medical Established Patient with   
Doreen Cabrera CNP                        2024  
  
  
  
                                                    Last Documented On   
4 6:51PM ; [facility]  
  
  
  
                                                    Bipolar affective disorder,   
current   
episode depressed, mild                  Established Patient with Leonie   
Short LISWS                             2024  
  
  
  
                                                    Last Documented On   
4 5:16PM ; [facility]  
  
  
  
                                        Post-traumatic stress disorder  Establ  
ished Patient with Leonie Short   
LISWS                                   2024  
  
  
  
                                                    Last Documented On   
4 5:16PM ; [facility]  
  
  
  
                                                    Undifferentiated attention d  
eficit   
disorder                                 Established Patient with   
Leonie Short LISWS                     2024  
  
  
  
                                                    Last Documented On   
4 5:16PM ; [facility]  
  
  
  
                                        Visit for: screening for disorder BH Est  
ablished Patient with Leonie Garrett LISWS                             2024  
  
  
  
                                                    Last Documented On   
4 5:16PM ; [facility]  
  
  
  
                                                    [Z68.43 - Body mass index [B  
MI]   
50.0-59.9, adult] assessment of body   
mass index                              Medical Established Patient with   
Doreen Cabrera CNP                        2024  
  
  
  
                                                    Last Documented On   
4 7:50PM ; [facility]  
  
  
  
                                        Attention-deficit hyperactivity disorder  
 Medical Established Patient with   
Doreen Cabrera CNP                        2024  
  
  
  
                                                    Last Documented On   
4 7:50PM ; [facility]  
  
  
  
                                                    Diabetes Risk Test Score was  
 three   
score 3/27/2024                         Medical Established Patient with Doreen Cabrera CNP                              2024  
  
  
  
                                                    Last Documented On   
4 7:50PM ; [facility]  
  
  
  
                                        Visit for: screening for STD Medical Est  
ablished Patient with Doreen Cabrera   
CNP                                     2024  
  
  
  
                                                    Last Documented On   
4 7:50PM ; [facility]  
  
  
  
                                                    [Z68.32 - Body mass index [B  
MI]   
32.0-32.9, adult] assessment of body   
mass index                              Medical Established Patient with   
Doreen Cabrera CNP                        2023  
  
  
  
                                                    Last Documented On   
3 6:01PM ; [facility]  
  
  
  
                                        Borderline personality disorder Medical   
Established Patient with Doreen Cabrera CNP                              2023  
  
  
  
                                                    Last Documented On   
3 6:01PM ; [facility]  
  
  
  
                                        Screening for diabetes mellitus Medical   
Established Patient with Doreen Cabrera CNP                              2023  
  
  
  
                                                    Last Documented On   
3 6:01PM ; [facility]  
  
  
  
                                        Assessment of body mass index Medical Es  
tablished Patient with Doreen Cabrera CNP                              10/21/2022  
  
  
  
                                                    Last Documented On 10/21/202  
2 2:45PM ; [facility]  
  
  
  
                                                    Bipolar affective disorder,   
current   
episode manic                            [facility]                        [facility]  
  
  
  
                                                    Last Documented On   
2 3:22PM ; [facility]  
  
  
  
                                                    Bipolar affective disorder,   
current   
episode manic                            Established Patient with Haylie Aguilar LPCC-S                        2022  
  
  
  
                                                    Last Documented On   
2 2:28PM ; [facility]  
  
  
  
                                                    Bipolar affective disorder,   
current   
episode depressed, severe with   
psychosis                                Telebehavioral Health with Haylie Aguilar LPCC-S                        2022  
  
  
  
                                                    Last Documented On   
2 3:29PM ; [facility]  
  
  
  
                                                    Assessment of body mass inde  
x [Body   
mass index [BMI] 50.0-59.9, adult]      Open Access - Established with Julieth   
Aliya CNP                               2022  
  
  
  
                                                    Last Documented On   
2 9:36AM ; [facility]  
  
  
  
                                                    Bipolar I disorder, most rec  
ent   
episode, manic                          Open Access - Established with Julieth   
Aliya CNP                               2022  
  
  
  
                                                    Last Documented On   
2 9:36AM ; [facility]  
  
  
  
                                        Post-traumatic stress disorder  Establ  
ished Patient with Haylie Aguilar   
LPCC-S                                  2022  
  
  
  
                                                    Last Documented On   
2 3:20PM ; [facility]  
  
  
  
                                No cough        Medical Established Patient with  
 Doreenpaola Mccormacken CNP 2022  
  
  
  
                                                    Last Documented On   
2 4:04PM ; [facility]  
  
  
  
                                                    Z68.43 - Body mass index [BM  
I]   
50.0-59.9, adult                        Medical Established Patient with   
Doreen Cabrera CNP                        2022  
  
  
  
                                                    Last Documented On   
2 4:04PM ; [facility]  
  
  
  
                                                    Borderline personality disor  
danny Pt   
reported hx of sx/dx                     Established Patient with Haylie Aguilar LPCC-S                        2022  
  
  
  
                                                    Last Documented On   
2 4:08PM ; [facility]  
  
  
  
                                                    Assessment of body mass inde  
x [Body   
mass index [BMI] 50.0-59.9, adult]      Open Access - Established with Julieth   
Aliya CNP                               2022  
  
  
  
                                                    Last Documented On   
2 7:41PM ; [facility]  
  
  
  
                                                    Diabetes Risk Test Score was  
 three   
score 2022                         Open Access - Established with Julieth   
Aliya CNP                               2022  
  
  
  
                                                    Last Documented On   
2 7:41PM ; [facility]  
  
  
  
                                                    Bipolar I disorder, most rec  
ent   
episode, manic                           Established Patient with Avis Felder LPCC-S                          2021  
  
  
  
                                                    Last Documented On   
1 1:35AM ; [facility]  
  
  
  
                                        Borderline personality disorder  Estab  
lished Patient with Avis   
Felder LPCC-S                          2021  
  
  
  
                                                    Last Documented On   
1 1:35AM ; [facility]  
  
  
  
                                        Post-traumatic stress disorder  Establ  
ished Patient with Avis   
Felder LPCC-S                          2021  
  
  
  
                                                    Last Documented On   
1 1:35AM ; [facility]  
  
  
  
                                                    Assessment of visit for: bhargavi myles for   
human immunodeficiency virus            Medical Established Patient with   
Doreen Deborah CNP                        2021  
  
  
  
                                                    Last Documented On   
1 2:56PM ; [facility]  
  
  
  
                                Nicotine dependence Medical Established Patient   
with Doreen Deborah CNP 2021  
  
  
  
                                                    Last Documented On   
1 2:56PM ; [facility]  
  
  
  
                                Tachycardia     Medical Established Patient with  
 Doreen Deborah CNP 2021  
  
  
  
                                                    Last Documented On   
1 2:56PM ; [facility]  
  
  
  
                                                    Z68.43 - Body mass index [BM  
I]   
50.0-59.9, adult                        Medical Established Patient with   
Doreen Deborah CNP                        2021  
  
  
  
                                                    Last Documented On   
1 2:56PM ; [facility]  
  
  
  
                                                    Bipolar I disorder, most rec  
ent   
episode, manic                           Established Patient with Leonie   
Short LISWS                             2021  
  
  
  
                                                    Last Documented On   
1 10:17AM ; [facility]  
  
  
  
                                                    Borderline personality disor  
danny per   
patient reported history                 Established Patient with Leonie   
Short LISWS                             2021  
  
  
  
                                                    Last Documented On   
1 10:17AM ; [facility]  
  
  
  
                                Nicotine dependence  Established Patient with   
Leonie Short LISWS 2021  
  
  
  
                                                    Last Documented On   
1 10:17AM ; [facility]  
  
  
  
                                        Post-traumatic stress disorder  Establ  
ished Patient with Leonie Short   
LISWS                                   2021  
  
  
  
                                                    Last Documented On   
1 10:17AM ; [facility]  
  
  
  
                                Body mass index Medical Established Patient with  
 Doreen Deborah CNP 2021  
  
  
  
                                                    Last Documented On   
1 5:23PM ; [facility]  
  
  
  
                                Morbid obesity  Medical Established Patient with  
 Doreen Deborah CNP 2021  
  
  
  
                                                    Last Documented On   
1 5:23PM ; [facility]  
  
  
  
                                        Nicotine dependence uncomplicated Medica  
l Established Patient with Doreen   
Deborah CNP                              2021  
  
  
  
                                                    Last Documented On   
1 5:23PM ; [facility]  
  
  
  
                                                    Z68.42 - Body mass index [BM  
I]   
45.0-49.9, adult                        Medical Established Patient with   
Doreen Deborah CNP                        2021  
  
  
  
                                                    Last Documented On   
1 5:23PM ; [facility]  
  
  
  
                                Episodic mood disorders On Call with Pamela Velezammy edwards CNP 2021  
  
  
  
                                                    Last Documented On   
1 7:49AM ; [facility]  
  
  
  
                                                    Mood disorders, NOS per tabatha  
ent   
reported history                         Established Patient with Leonie   
Short LISWS                             2021  
  
  
  
                                                    Last Documented On   
1 7:15PM ; [facility]  
  
  
  
                                        Post-traumatic stress disorder  Establ  
ished Patient with Leonie Short   
LISWS                                   2021  
  
  
  
                                                    Last Documented On   
1 7:15PM ; [facility]  
  
  
  
                                Morbid obesity  Medical Established Patient with  
 Doreen Deborah CNP 2021  
  
  
  
                                                    Last Documented On   
1 12:31PM ; [facility]  
  
  
  
                                Otitis externa  Medical Established Patient with  
 Doreen Deborah CNP 2021  
  
  
  
                                                    Last Documented On   
1 12:31PM ; [facility]  
  
  
  
                                                    Z68.42 - Body mass index [BM  
I]   
45.0-49.9, adult                        Medical Established Patient with   
Doreen Deborah CNP                        2021  
  
  
  
                                                    Last Documented On   
1 12:31PM ; [facility]  
  
  
  
                                        Post-traumatic stress disorder  Establ  
ished Patient with Leonie Short   
LISWS                                   06/10/2021  
  
  
  
                                                    Last Documented On 06/10/202  
1 10:05PM ; [facility]  
  
  
  
                                Morbid obesity  Medical Established Patient with  
 Doreen Deborah CNP 06/10/2021  
  
  
  
                                                    Last Documented On 06/10/202  
1 4:45PM ; [facility]  
  
  
  
                                                    Z68.42 - Body mass index [BM  
I]   
45.0-49.9, adult                        Medical Established Patient with   
Doreen Deborah CNP                        06/10/2021  
  
  
  
                                                    Last Documented On 06/10/202  
1 4:45PM ; [facility]  
  
  
  
                                        Post-traumatic stress disorder  Establ  
ished Patient with Leonie Short   
LISWS                                   2021  
  
  
  
                                                    Last Documented On   
1 11:59AM ; [facility]  
  
  
  
                                                    Assessment of visit for: bhargavi myles for   
human immunodeficiency virus            Medical New Patient with Doreenpaola Cabrera CNP                              2021  
  
  
  
                                                    Last Documented On   
1 4:06PM ; [facility]  
  
  
  
                                                    Diabetes Risk Test Score was  
 one score   
2021                               Medical New Patient with Doreen Cabrera CNP                              2021  
  
  
  
                                                    Last Documented On   
1 4:06PM ; [facility]  
  
  
  
                                Hypertension    Medical New Patient with Doreenpaola esposito CNP 2021  
  
  
  
                                                    Last Documented On   
1 4:06PM ; [facility]  
  
  
  
                                Morbid obesity  Medical New Patient with Doreen DAVID esposito CNP 2021  
  
  
  
                                                    Last Documented On   
1 4:06PM ; [facility]  
  
  
  
                                Post-traumatic stress disorder Medical New Patie  
nt with Doreen Cabrera CNP   
2021  
  
  
  
                                                    Last Documented On   
1 4:06PM ; [facility]  
  
  
  
                                                    Z68.42 - Body mass index [BM  
I]   
45.0-49.9, adult                        Medical New Patient with Doreen Cabrera CNP                              2021  
  
  
  
                                                    Last Documented On   
1 4:06PM ; [facility]  
Work Phone: [phone]- Evaluation note  
  
Includes: Assessments for all patient encounters  
  
  
  
                                Findings        Encounter       Date  
   
                                                    Attention-deficit hyperactiv  
ity   
disorder                                 Established Patient with Leonie   
Short LISWS                             2024  
  
  
  
                                                    Last Documented On   
4 10:10AM ; [facility]  
  
  
  
                                                    Bipolar I disorder, most rec  
ent   
episode, depressed - mild               BH Established Patient with Leonie   
Short LISWS                             2024  
  
  
  
                                                    Last Documented On   
4 10:10AM ; [facility]  
  
  
  
                                        Post-traumatic stress disorder  Establ  
ished Patient with Leonie Short   
LISWS                                   2024  
  
  
  
                                                    Last Documented On   
4 10:10AM ; [facility]  
  
  
  
                                        [D64.9 - Anemia, unspecified] anemia Med  
ical Established Patient with   
Doreen Deborah CNP                        2024  
  
  
  
                                                    Last Documented On   
4 6:51PM ; [facility]  
  
  
  
                                                    [Z68.43 - Body mass index [B  
MI]   
50.0-59.9, adult] assessment of body   
mass index                              Medical Established Patient with   
Doreen Cabrera CNP                        2024  
  
  
  
                                                    Last Documented On   
4 6:51PM ; [facility]  
  
  
  
                                                    Bipolar affective disorder,   
current   
episode depressed, mild                  Established Patient with Leonie   
Short LISWS                             2024  
  
  
  
                                                    Last Documented On   
4 5:16PM ; [facility]  
  
  
  
                                        Post-traumatic stress disorder BH Establ  
ished Patient with Leonie Short   
LISWS                                   2024  
  
  
  
                                                    Last Documented On   
4 5:16PM ; [facility]  
  
  
  
                                                    Undifferentiated attention d  
eficit   
disorder                                 Established Patient with   
Leonie Short LISWS                     2024  
  
  
  
                                                    Last Documented On   
4 5:16PM ; [facility]  
  
  
  
                                        Visit for: screening for disorder BH Est  
ablished Patient with Leonie   
Short LISWS                             2024  
  
  
  
                                                    Last Documented On   
4 5:16PM ; [facility]  
  
  
  
                                                    [Z68.43 - Body mass index [B  
MI]   
50.0-59.9, adult] assessment of body   
mass index                              Medical Established Patient with   
Doreen Cabrera CNP                        2024  
  
  
  
                                                    Last Documented On   
4 7:50PM ; [facility]  
  
  
  
                                        Attention-deficit hyperactivity disorder  
 Medical Established Patient with   
Doreen Cabrera CNP                        2024  
  
  
  
                                                    Last Documented On   
4 7:50PM ; [facility]  
  
  
  
                                                    Diabetes Risk Test Score was  
 three   
score 3/27/2024                         Medical Established Patient with Doreen Cabrera CNP                              2024  
  
  
  
                                                    Last Documented On   
4 7:50PM ; [facility]  
  
  
  
                                        Visit for: screening for STD Medical Est  
ablished Patient with Doreenpaola Mccormacken   
CNP                                     2024  
  
  
  
                                                    Last Documented On   
4 7:50PM ; [facility]  
  
  
  
                                                    [Z68.32 - Body mass index [B  
MI]   
32.0-32.9, adult] assessment of body   
mass index                              Medical Established Patient with   
Doreen Cabrera CNP                        2023  
  
  
  
                                                    Last Documented On   
3 6:01PM ; [facility]  
  
  
  
                                        Borderline personality disorder Medical   
Established Patient with Doreen   
Deborah CNP                              2023  
  
  
  
                                                    Last Documented On   
3 6:01PM ; [facility]  
  
  
  
                                        Screening for diabetes mellitus Medical   
Established Patient with Doreenpaola Mccormacken CNP                              2023  
  
  
  
                                                    Last Documented On   
3 6:01PM ; [facility]  
  
  
  
                                        Assessment of body mass index Medical Es  
tablished Patient with Doreenpaola Cabrera CNP                              10/21/2022  
  
  
  
                                                    Last Documented On 10/21/202  
2 2:45PM ; [facility]  
  
  
  
                                                    Bipolar affective disorder,   
current   
episode manic                            Telebehavioral Health with Haylie   
Aguilar LP[facility]-S                        2022  
  
  
  
                                                    Last Documented On   
2 3:22PM ; [facility]  
  
  
  
                                                    Bipolar affective disorder,   
current   
episode manic                           BH Established Patient with Haylie   
Aguilar LPCC-S                        2022  
  
  
  
                                                    Last Documented On   
2 2:28PM ; [facility]  
  
  
  
                                                    Bipolar affective disorder,   
current   
episode depressed, severe with   
psychosis                                Telebehavioral Health with Haylie   
Aguilar LPCC-S                        2022  
  
  
  
                                                    Last Documented On   
2 3:29PM ; [facility]  
  
  
  
                                                    Assessment of body mass inde  
x [Body   
mass index [BMI] 50.0-59.9, adult]      Open Access - Established with Julieth   
Aliya CNP                               2022  
  
  
  
                                                    Last Documented On   
2 9:36AM ; [facility]  
  
  
  
                                                    Bipolar I disorder, most rec  
ent   
episode, manic                          Open Access - Established with Julieth   
Aliya CNP                               2022  
  
  
  
                                                    Last Documented On   
2 9:36AM ; [facility]  
  
  
  
                                        Post-traumatic stress disorder BH Establ  
ished Patient with Haylie Aguilar   
[facility]                                  2022  
  
  
  
                                                    Last Documented On   
2 3:20PM ; [facility]  
  
  
  
                                No cough        Medical Established Patient with  
 Doreenpaola Mccormacken CNP 2022  
  
  
  
                                                    Last Documented On   
2 4:04PM ; [facility]  
  
  
  
                                                    Z68.43 - Body mass index [BM  
I]   
50.0-59.9, adult                        Medical Established Patient with   
Doreen Deborah CNP                        2022  
  
  
  
                                                    Last Documented On   
2 4:04PM ; [facility]  
  
  
  
                                                    Borderline personality disor  
danny Pt   
reported hx of sx/dx                     [facility]                        20[facility]  
  
  
  
                                                    Last Documented On   
2 4:08PM ; [facility]  
  
  
  
                                                    Assessment of body mass inde  
x [Body   
mass index [BMI] 50.0-59.9, adult]      Open Access - Established with Julieth Pisano CNP                               2022  
  
  
  
                                                    Last Documented On   
2 7:41PM ; [facility]  
  
  
  
                                                    Diabetes Risk Test Score was  
 three   
score 2022                         Open Access - Established with Julieth Pisano CNP                               2022  
  
  
  
                                                    Last Documented On   
2 7:41PM ; [facility]  
  
  
  
                                                    Bipolar I disorder, most rec  
ent   
episode, manic                           Established Patient with Avis   
[facility]                          20[facility]  
  
  
  
                                                    Last Documented On   
1 1:35AM ; [facility]  
  
  
  
                                        Borderline personality disorder  Estab  
lished Patient with Avis   
[facility]                          2021  
  
  
  
                                                    Last Documented On   
1 1:35AM ; [facility]  
  
  
  
                                        Post-traumatic stress disorder  Establ  
ished Patient with Avis   
[facility]                          2021  
  
  
  
                                                    Last Documented On   
1 1:35AM ; [facility]  
  
  
  
                                                    Assessment of visit for: scr  
eening for   
human immunodeficiency virus            Medical Established Patient with   
Doreen Deborah [facility]                        2021  
  
  
  
                                                    Last Documented On   
1 2:56PM ; [facility]  
  
  
  
                                Nicotine dependence Medical Established Patient   
with Doreen Deborah CNP 2021  
  
  
  
                                                    Last Documented On   
1 2:56PM ; [facility]  
  
  
  
                                Tachycardia     Medical Established Patient with  
 Doreen Deborah CNP 2021  
  
  
  
                                                    Last Documented On   
1 2:56PM ; [facility]  
  
  
  
                                                    Z68.43 - Body mass index [BM  
I]   
50.0-59.9, adult                        Medical Established Patient with   
Doreen Deborah [facility]                        2021  
  
  
  
                                                    Last Documented On   
1 2:56PM ; [facility]  
  
  
  
                                                    Bipolar I disorder, most rec  
ent   
episode, manic                           Established Patient with Leonie   
Short [facility]                             2021  
  
  
  
                                                    Last Documented On   
1 10:17AM ; [facility]  
  
  
  
                                                    Borderline personality disor  
danny per   
patient reported history                 Established Patient with Leonie   
Short [facility]                             2021  
  
  
  
                                                    Last Documented On   
1 10:17AM ; [facility]  
  
  
  
                                Nicotine dependence BH Established Patient with   
Leonie Short [facility] 2021  
  
  
  
                                                    Last Documented On   
1 10:17AM ; [facility]  
  
  
  
                                        Post-traumatic stress disorder  Establ  
ished Patient with Leonie Short   
LISWS                                   2021  
  
  
  
                                                    Last Documented On   
1 10:17AM ; [facility]  
  
  
  
                                Body mass index Medical Established Patient with  
 Doreen Deborah CNP 2021  
  
  
  
                                                    Last Documented On   
1 5:23PM ; [facility]  
  
  
  
                                Morbid obesity  Medical Established Patient with  
 Doreen Deborah CNP 2021  
  
  
  
                                                    Last Documented On   
1 5:23PM ; [facility]  
  
  
  
                                        Nicotine dependence uncomplicated Medica  
l Established Patient with Doreen   
Deborah CNP                              2021  
  
  
  
                                                    Last Documented On   
1 5:23PM ; [facility]  
  
  
  
                                                    Z68.42 - Body mass index [BM  
I]   
45.0-49.9, adult                        Medical Established Patient with   
Doreen Deborah CNP                        2021  
  
  
  
                                                    Last Documented On   
1 5:23PM ; [facility]  
  
  
  
                                Episodic mood disorders On Call with aPmela edwards CNP 2021  
  
  
  
                                                    Last Documented On   
1 7:49AM ; [facility]  
  
  
  
                                                    Mood disorders, NOS per tabatha  
ent   
reported history                         Established Patient with Leonie   
Short [facility]                             2021  
  
  
  
                                                    Last Documented On   
1 7:15PM ; [facility]  
  
  
  
                                        Post-traumatic stress disorder  Establ  
ished Patient with Leonie Short   
LISWS                                   2021  
  
  
  
                                                    Last Documented On   
1 7:15PM ; [facility]  
  
  
  
                                Morbid obesity  Medical Established Patient with  
 Doreen Deborah CNP 2021  
  
  
  
                                                    Last Documented On   
1 12:31PM ; [facility]  
  
  
  
                                Otitis externa  Medical Established Patient with  
 Doreen Deborah CNP 2021  
  
  
  
                                                    Last Documented On   
1 12:31PM ; [facility]  
  
  
  
                                                    Z68.42 - Body mass index [BM  
I]   
45.0-49.9, adult                        Medical Established Patient with   
Doreen Deborah CNP                        2021  
  
  
  
                                                    Last Documented On   
1 12:31PM ; [facility]  
  
  
  
                                        Post-traumatic stress disorder  Establ  
ished Patient with Leonie Short   
LISWS                                   06/10/2021  
  
  
  
                                                    Last Documented On 06/10/202  
1 10:05PM ; [facility]  
  
  
  
                                Morbid obesity  Medical Established Patient with  
 Doreen Deborah CNP 06/10/2021  
  
  
  
                                                    Last Documented On 06/10/202  
1 4:45PM ; [facility]  
  
  
  
                                                    Z68.42 - Body mass index [BM  
I]   
45.0-49.9, adult                        Medical Established Patient with   
Doreen Deborah CNP                        06/10/2021  
  
  
  
                                                    Last Documented On 06/10/202  
1 4:45PM ; [facility]  
  
  
  
                                        Post-traumatic stress disorder  Establ  
ished Patient with Leonie Short   
LISWS                                   2021  
  
  
  
                                                    Last Documented On   
1 11:59AM ; [facility]  
  
  
  
                                                    Assessment of visit for: scr  
eening for   
human immunodeficiency virus            Medical New Patient with Doreen   
Deborah CNP                              2021  
  
  
  
                                                    Last Documented On   
1 4:06PM ; [facility]  
  
  
  
                                                    Diabetes Risk Test Score was  
 one score   
2021                               Medical New Patient with Doreen   
Deborah CNP                              2021  
  
  
  
                                                    Last Documented On   
1 4:06PM ; [facility]  
  
  
  
                                Hypertension    Medical New Patient with Doreen C  
cate CNP 2021  
  
  
  
                                                    Last Documented On   
1 4:06PM ; [facility]  
  
  
  
                                Morbid obesity  Medical New Patient with Doreen C  
cate CNP 2021  
  
  
  
                                                    Last Documented On   
1 4:06PM ; [facility]  
  
  
  
                                Post-traumatic stress disorder Medical New Patie  
nt with Doreen Deborah CNP   
2021  
  
  
  
                                                    Last Documented On   
1 4:06PM ; [facility]  
  
  
  
                                                    Z68.42 - Body mass index [BM  
I]   
45.0-49.9, adult                        Medical New Patient with Doreen   
Deborah CNP                              2021  
  
  
  
                                                    Last Documented On   
1 4:06PM ; [facility]  
Work Phone: [phone]- History general Narrative - Reported  
  
Includes: Medical History in patient's chart  
  
  
  
                                        Description         Last Updated  
   
                                        POTS                2024  
  
  
  
                                                    Last Documented On   
4 6:51PM ; [facility]  
  
  
  
                                        0 previous live birth(s) 2024  
  
  
  
                                                    Last Documented On   
4 7:50PM ; [facility]  
  
  
  
                                        Previously pregnant 1 time(s) 2024  
  
  
  
                                                    Last Documented On   
4 7:50PM ; [facility]  
  
  
  
                                        Recent immunization for flu 2024  
  
  
  
                                                    Last Documented On   
4 7:50PM ; [facility]  
  
  
  
                                        Has sex without a condom 2022  
  
  
  
                                                    Last Documented On   
2 4:04PM ; [facility]  
  
  
  
                                        Not planning to have a baby in the next   
12 months 2022  
  
  
  
                                                    Last Documented On   
2 4:04PM ; [facility]  
  
  
  
                                        Partners sexually transmitted infection   
status known 2022  
  
  
  
                                                    Last Documented On   
2 4:04PM ; [facility]  
  
  
  
                                        No previous hospitalizations 2022  
  
  
  
                                                    Last Documented On   
2 4:04PM ; [facility]  
  
  
  
                                        Chronic illness     2021  
  
  
  
                                                    Last Documented On   
1 7:49AM ; [facility]  
  
  
  
                                        Exposure to COVID-19 2021  
  
  
  
                                                    Last Documented On   
1 12:31PM ; [facility]  
  
  
  
                                        History of gynecologic disorder 20  
21  
  
  
  
                                                    Last Documented On   
1 4:06PM ; [facility]  
  
  
  
                                        History of Polycystic Ovarian Syndrome (  
PCOS) 2021  
  
  
  
                                                    Last Documented On   
1 4:06PM ; [facility]  
  
  
  
                                        History of anxiety disorder NOS 20  
21  
  
  
  
                                                    Last Documented On   
1 4:06PM ; [facility]  
  
  
  
                                        History of migraine headache 2021  
  
  
  
                                                    Last Documented On   
1 4:06PM ; [facility]  
  
  
  
                                        History of psychiatric disorders biopola  
r disorder 2021  
  
  
  
                                                    Last Documented On   
1 4:06PM ; [facility]  
Work Phone: [phone]- History general Narrative - Reported  
  
Includes: Medical History in patient's chart  
  
  
  
                                        Description         Last Updated  
   
                                        POTS                2024  
  
  
  
                                                    Last Documented On   
4 6:51PM ; [facility]  
  
  
  
                                        0 previous live birth(s) 2024  
  
  
  
                                                    Last Documented On   
4 7:50PM ; [facility]  
  
  
  
                                        Previously pregnant 1 time(s) 2024  
  
  
  
                                                    Last Documented On   
4 7:50PM ; [facility]  
  
  
  
                                        Recent immunization for flu 2024  
  
  
  
                                                    Last Documented On   
4 7:50PM ; [facility]  
  
  
  
                                        Has sex without a condom 2022  
  
  
  
                                                    Last Documented On   
2 4:04PM ; [facility]  
  
  
  
                                        Not planning to have a baby in the next   
12 months 2022  
  
  
  
                                                    Last Documented On   
2 4:04PM ; [facility]  
  
  
  
                                        Partners sexually transmitted infection   
status known 2022  
  
  
  
                                                    Last Documented On   
2 4:04PM ; [facility]  
  
  
  
                                        No previous hospitalizations 2022  
  
  
  
                                                    Last Documented On   
2 4:04PM ; [facility]  
  
  
  
                                        Chronic illness     2021  
  
  
  
                                                    Last Documented On   
1 7:49AM ; [facility]  
  
  
  
                                        Exposure to COVID-19 2021  
  
  
  
                                                    Last Documented On   
1 12:31PM ; [facility]  
  
  
  
                                        History of gynecologic disorder 20  
21  
  
  
  
                                                    Last Documented On   
1 4:06PM ; [facility]  
  
  
  
                                        History of Polycystic Ovarian Syndrome (  
PCOS) 2021  
  
  
  
                                                    Last Documented On   
1 4:06PM ; [facility]  
  
  
  
                                        History of anxiety disorder NOS 20  
21  
  
  
  
                                                    Last Documented On   
1 4:06PM ; [facility]  
  
  
  
                                        History of migraine headache 2021  
  
  
  
                                                    Last Documented On   
1 4:06PM ; [facility]  
  
  
  
                                        History of psychiatric disorders biopola  
r disorder 2021  
  
  
  
                                                    Last Documented On   
1 4:06PM ; [facility]  
Work Phone: [phone]- History general Narrative - Reported  
  
Includes: Medical History in patient's chart  
  
  
  
                                        Description         Last Updated  
   
                                        POTS                2024  
  
  
  
                                                    Last Documented On   
4 6:51PM ; [facility]  
  
  
  
                                        0 previous live birth(s) 2024  
  
  
  
                                                    Last Documented On   
4 7:50PM ; [facility]  
  
  
  
                                        Previously pregnant 1 time(s) 2024  
  
  
  
                                                    Last Documented On   
4 7:50PM ; [facility]  
  
  
  
                                        Recent immunization for flu 2024  
  
  
  
                                                    Last Documented On   
4 7:50PM ; [facility]  
  
  
  
                                        Has sex without a condom 2022  
  
  
  
                                                    Last Documented On   
2 4:04PM ; [facility]  
  
  
  
                                        Not planning to have a baby in the next   
12 months 2022  
  
  
  
                                                    Last Documented On   
2 4:04PM ; [facility]  
  
  
  
                                        Partners sexually transmitted infection   
status known 2022  
  
  
  
                                                    Last Documented On   
2 4:04PM ; [facility]  
  
  
  
                                        No previous hospitalizations 2022  
  
  
  
                                                    Last Documented On   
2 4:04PM ; [facility]  
  
  
  
                                        Chronic illness     2021  
  
  
  
                                                    Last Documented On   
1 7:49AM ; [facility]  
  
  
  
                                        Exposure to COVID-19 2021  
  
  
  
                                                    Last Documented On   
1 12:31PM ; [facility]  
  
  
  
                                        History of gynecologic disorder 20  
21  
  
  
  
                                                    Last Documented On   
1 4:06PM ; [facility]  
  
  
  
                                        History of Polycystic Ovarian Syndrome (  
PCOS) 2021  
  
  
  
                                                    Last Documented On   
1 4:06PM ; [facility]  
  
  
  
                                        History of anxiety disorder NOS 20  
21  
  
  
  
                                                    Last Documented On   
1 4:06PM ; [facility]  
  
  
  
                                        History of migraine headache 2021  
  
  
  
                                                    Last Documented On   
1 4:06PM ; [facility]  
  
  
  
                                        History of psychiatric disorders biopola  
r disorder 2021  
  
  
  
                                                    Last Documented On   
1 4:06PM ; [facility]  
Work Phone: [phone]- History general Narrative - Reported  
  
Includes: Medical History in patient's chart  
  
  
  
                                        Description         Last Updated  
   
                                        POTS                2024  
  
  
  
                                                    Last Documented On   
4 6:51PM ; [facility]  
  
  
  
                                        0 previous live birth(s) 2024  
  
  
  
                                                    Last Documented On   
4 7:50PM ; [facility]  
  
  
  
                                        Previously pregnant 1 time(s) 2024  
  
  
  
                                                    Last Documented On   
4 7:50PM ; [facility]  
  
  
  
                                        Recent immunization for flu 2024  
  
  
  
                                                    Last Documented On   
4 7:50PM ; [facility]  
  
  
  
                                        Has sex without a condom 2022  
  
  
  
                                                    Last Documented On   
2 4:04PM ; [facility]  
  
  
  
                                        Not planning to have a baby in the next   
12 months 2022  
  
  
  
                                                    Last Documented On   
2 4:04PM ; [facility]  
  
  
  
                                        Partners sexually transmitted infection   
status known 2022  
  
  
  
                                                    Last Documented On   
2 4:04PM ; [facility]  
  
  
  
                                        No previous hospitalizations 2022  
  
  
  
                                                    Last Documented On   
2 4:04PM ; [facility]  
  
  
  
                                        Chronic illness     2021  
  
  
  
                                                    Last Documented On   
1 7:49AM ; [facility]  
  
  
  
                                        Exposure to COVID-19 2021  
  
  
  
                                                    Last Documented On   
1 12:31PM ; [facility]  
  
  
  
                                        History of gynecologic disorder 20  
21  
  
  
  
                                                    Last Documented On   
1 4:06PM ; [facility]  
  
  
  
                                        History of Polycystic Ovarian Syndrome (  
PCOS) 2021  
  
  
  
                                                    Last Documented On   
1 4:06PM ; [facility]  
  
  
  
                                        History of anxiety disorder NOS 20  
21  
  
  
  
                                                    Last Documented On   
1 4:06PM ; [facility]  
  
  
  
                                        History of migraine headache 2021  
  
  
  
                                                    Last Documented On   
1 4:06PM ; [facility]  
  
  
  
                                        History of psychiatric disorders biopola  
r disorder 2021  
  
  
  
                                                    Last Documented On   
1 4:06PM ; [facility]  
Work Phone: [phone]- Progress note*   
  
Progress note  
  
  
  
                                Date            Encounter       Last Documented   
by  
   
                                2024      Medical Established Patient Last  
 documented on 2024; 6:51 PM,   
Doreen Cabrera CNP; [facility]  
  
  
  
                                                      
  
  
** Active Problems & Conditions **  
- F90.9 - Attention-deficit Hyperactivity Disorder  
- F31.31 - Bipolar I Disorder, Most Recent Episode, Depressed Mild  
- F43.10 - Post-traumatic Stress Disorder  
  
** Chief Complaint **  
The Chief Complaint is: Patient is not seeing [facility] any longer and wants to
   
discuss metformin. Patient is still taking med, but it is still getting diarrhea
 and   
nausea. Patient is wanting to get into [facility]. She has tried to contact Dr. Patterson 
in   
[place], but has not heard back. Patient provided with phone number for [facility].  
Patient here for 2 month med check. Patient is taking meds as prescribed. 
Patient is   
still feeling anxiousness at times.  
Patient refused HPV and PCV.  
  
** Referred Here **  
Not referred by urgent care clinic and not the emergency room. No prior 
encounters.  
- Data to be reviewed: no clinical lab tests  
  
** History of Present Illness **  
Linnette Beckham is a 24 year old female.  
- Allergy list reviewed - Reviewed Medications - Medication list reviewed  
- Date of last menstruation 2024 - Pregnancy test - would not like a 
pregnancy   
test  
Presents with sig other  Tyesha  for med f/u . wants to get a new gyn . reports 
no   
longer doing counseling r/t  she said I dont need it  labs reviewed from last 
month   
ER visit and I explained her cbc shows anemia , likely r/t heavy periods . I 
would   
like them to get further labs as pt does admit to fatigue / sleeping 14 hours 
per day  
  
** Current Medication **  
- ARIPiprazole 5 MG Oral Tablet take 1 tablet by mouth once daily, 30 days, 5 
refills  
- busPIRone HCl 5 MG Oral Tablet take 1 tablet by mouth twice daily, 30 days, 5   
refills  
- Intuniv 1 MG Oral Tablet Extended Release 24 Hour take 1 tablet by mouth once 
daily   
at bedtime, 30 days, 5 refills  
- lamoTRIgine 100 MG Oral Tablet take 1 tablet by mouth once daily, 30 days, 5   
refills  
- MiraLax 17 GM/SCOOP Oral Powder mix 1 scoop with 8 ounces once daily, 30 days,
 2   
refills  
- Narcan 4 MG/0.1ML Nasal Liquid spray in nostril for symptoms of overdose , may
   
repeat in 2 minutes if symptoms persist, 1 days, 0 refills  
- Prazosin HCl 1 MG Oral Capsule take 1 capsule by mouth twice daily, 30 days, 5
   
refills  
- Sertraline HCl 50 MG Oral Tablet take 1 tablet by mouth once daily, 30 days, 5
   
refills  
- traZODone HCl 100 MG Oral Tablet take 1 tablet by mouth twice daily, 30 days, 
5   
refills  
  
** Past Medical/Surgical History **  
Reported:  
Has sex without a condom.  
Medical: No previous hospitalizations. Chronic illness and Sexually transmitted   
infection Partners sexually transmitted infection status known.  
Immunization History: Recent immunization for flu.  
Exposure: Exposure to COVID-19.  
Pregnancy: Previously pregnant 1 time(s) and para having 0 live birth(s). Not   
planning a pregnancy in the next year.  
Diagnoses:  
Polycystic Ovarian Syndrome (PCOS).  
Migraine headache.  
Psychiatric disorders biopolar disorder  
Anxiety disorder NOS  
POTS.  
Procedural:  
- Insertion of ear pressure equalization tubes in both ears  
Surgical:  
- Tonsillectomy  
- Tonsillectomy with adenoidectomy  
  
** Social History **  
Environmental Exposure: Secondhand cigarette smoke exposure.  
Behavioral: Not a current tobacco user.  
Tobacco use: Using electronic cigarettes/vaping.  
Alcohol: Not using alcohol.  
Drug Use: Not using drugs denied by patient.  
Sexual: Sexually active age of sexual partner is _____ years old 21, sexual   
orientation Lesbian or David, gender identity Other, and birth control is being   
practiced Not Using - In Same Sex Relationship.  
  
** Allergies **  
- Abilify Reaction: groggy  
- Augmentin Reaction: Shock  
- Metformin Reaction: Nausea, Vomiting  
- NO KNOWN ENVIRONMENTAL ALLERGIES  
- NO KNOWN FOOD ALLERGIES  
- Sertraline Reaction: slow/groggy  
- Trazodone Hydrochloride Reaction: Panic attack  
  
** Family History **  
Paternal:  
Systemic hypertension  
Oncologic disorder  
Maternal:  
Systemic hypertension  
Epilepsy and recurrent seizures  
Psychiatric disorders  
Oncologic disorder  
Fraternal:  
Psychiatric disorders  
  
** Review Of Systems **  
Head: No head symptoms.  
Neck: No neck symptoms.  
Eyes: No eye symptoms.  
Otolaryngeal: No ear symptoms, no nasal symptoms, no nose and sinus finding, no   
throat symptoms, no oral cavity symptoms, and no jaw symptoms.  
Breasts: No breast symptoms.  
Cardiovascular: No cardiovascular symptoms and no chest pain or discomfort.  
Pulmonary: No pulmonary symptoms.  
Gastrointestinal: No gastrointestinal symptoms.  
Genitourinary: No genitourinary symptoms. Obstetrics and gynecology: heavy 
periods.  
Musculoskeletal: No musculoskeletal symptoms.  
Neurological: No neurological symptoms.  
Psychological: No psychological symptoms.  
Skin: No skin symptoms.  
Cardiovascular: Edema not present.  
  
** Physical Findings **  
- Vitals taken 2024 04:39 pm  
BP-Sitting L161/94 mmHg  
BP Cuff SizeLarge  
Pulse Rate-Qzvwfhc75 bpm  
Xlslba82 in  
Unrdje666 lbs  
Body Mass Index54.6 kg/m2  
Body Surface Area2.4 m2  
Oxygen Gashkaaxmf51 %  
  
- Vitals taken 2024 04:50 pm  
BP-Sitting L137/89 mmHg  
BP Cuff SizeLarge  
Pulse Rate-Iabcgwm08 bpm  
  
Vital Signs:  
- Systolic blood pressure 130 - 139 mmHg.  
- Diastolic blood pressure 80-89 mmHg.  
General Appearance:  
- Awake. - Alert. - Well developed. - Well nourished. - In no acute distress.  
Lungs:  
- Respiration rhythm and depth was normal. - Clear to auscultation.  
Cardiovascular:  
Heart Rate And Rhythm: - Normal.  
Heart Sounds: - Normal.  
Murmurs: - No murmurs were heard.  
Abdomen:  
Visual Inspection: - Abdomen was normal on visual inspection.  
Musculoskeletal System:  
General/bilateral: - Normal movement of all extremities.  
Skin:  
- General appearance was normal.  
  
** Tests **  
Laboratory-based Chemistry:  
Other Laboratory Tests:  
Screening for sexually transmitted infections was not performed.  
  
** Assessment **  
- Z68.43 - Body mass index [BMI] 50.0-59.9, adult  
- D64.9 - Anemia, unspecified  
  
** Counseling/Education **  
- Does not want to stop using current contraception  
- Wishing to stop using electronic cigarettes/vaping  
- Discussed nutritional needs teach healthy choices including fruits and 
vegetables  
- Patient education about a proper diet  
- Not requesting contraception  
- Discussed concerns about exercise: promote physical activity  
  
** Plan **  
StartCited- Anemia, unspecified  
Lab: Iron and TIBC  
Lab: Ferritin  
Lab: CBC With Differential/Platelet  
Lab: TSH+T4F+T3Free  
Lab: Thyroid Antibodies  
EndCited  
StartCited- Other  
Follow-up Appointment  
2 month med check  
EndCited  
# given to [facility] , call michael win appt asap . labs asap to further 
check   
on anemia.  
  
** Care Team **  
- Doreen Cabrera CNP  
  
** Health Reminders **  
- Assess BMI satisfied 2024.  
- Assess Tobacco Use satisfied 2024.  
- Follow Up Plan BMI Management satisfied 2024.  
  
** User Defined 1 **  
Not planning a pregnancy in the next year.  
  
  
[facility]06- Progress note*   
  
Progress note  
  
  
  
                                Date            Encounter       Last Documented   
by  
   
                                2024       Established Patient Last docu  
mented on 2024; 10:10 AM,   
Leonie HUTCHINSON; [facility]  
  
  
  
                                                      
  
  
** Active Problems & Conditions **  
- F90.9 - Attention-deficit Hyperactivity Disorder  
- F31.31 - Bipolar I Disorder, Most Recent Episode, Depressed Mild  
- F43.10 - Post-traumatic Stress Disorder  
  
** Chief Complaint **  
The Chief Complaint is: [facility] met with patient to follow-up regarding mood and PHQ
   
score of 2 and ESTHELA score of 6. Patient reports noticing increased anxiety,   
overthinking and catastrophizing recently. Patient reports in the past, summers 
have   
been difficult and noticed a decline in mood around this time of year. Patient   
reports having several positive things ongoing in life at this time. Patient 
reports   
stressors related to physical health. Patient reports no thoughts of harm toward
 self   
or others.  
  
** History of Present Illness **  
Linnette Beckham is a 24 year old female.  
- Normal appetite - Irritability  
- Anxiety with persistent worry - With anticipation of misfortune to self or 
others -   
Sleep disturbances as patient reports sleeping too much (around 14-16 hours) - 
Energy   
level is fair - Feeling guilty - Racing thoughts - No depression - No loss of   
interest in activities - Not easily distracted - No social isolation - No 
impulsive   
behavior  
  
** Current Medication **  
- ARIPiprazole 5 MG Oral Tablet take 1 tablet by mouth once daily, 30 days, 5 
refills  
- busPIRone HCl 5 MG Oral Tablet take 1 tablet by mouth twice daily, 30 days, 5   
refills  
- Intuniv 1 MG Oral Tablet Extended Release 24 Hour take 1 tablet by mouth once 
daily   
at bedtime, 30 days, 5 refills  
- lamoTRIgine 100 MG Oral Tablet take 1 tablet by mouth once daily, 30 days, 5   
refills  
- MiraLax 17 GM/SCOOP Oral Powder mix 1 scoop with 8 ounces once daily, 30 days,
 2   
refills  
- Narcan 4 MG/0.1ML Nasal Liquid spray in nostril for symptoms of overdose , may
   
repeat in 2 minutes if symptoms persist, 1 days, 0 refills  
- Prazosin HCl 1 MG Oral Capsule take 1 capsule by mouth twice daily, 30 days, 5
   
refills  
- Sertraline HCl 50 MG Oral Tablet take 1 tablet by mouth once daily, 30 days, 5
   
refills  
- traZODone HCl 100 MG Oral Tablet take 1 tablet by mouth twice daily, 30 days, 
5   
refills  
  
** Social History **  
Medical: Chronic illness with POTS and PCOS.  
Environmental Exposure: No secondhand cigarette smoke exposure.  
Personal: Family problems and recent emotional stress.  
Behavioral: Not a current tobacco user.  
Tobacco use: Using electronic cigarettes/vaping.  
Alcohol: Not using alcohol.  
Drug Use: Not using drugs.  
  
** Physical Findings **  
General Appearance:  
- Normal Appearance.  
Neurological:  
- Estimated intelligence was normal. - Oriented to time, place, and person. - No
   
hallucinations. - Judgement was not impaired.  
Speech: - Is Normal.  
Psychiatric:  
- Mood is Euthymic. - Attitude Open.  
Demonstrated Behavior: - Motor Activity Normal Activity. - Eye Contact 
Appropriate.  
Affect: - Congruent with the mood.  
Thought Content: - Insight was intact. - No delusions. - No suicidal ideation. -
 No   
Passive thoughts of Death. - No suicidal plans. - No suicidal intent. - No 
homicidal   
ideations. - No homicidal plans. - No homicidal intent.  
Past Medical:  
- No repetitive self injurious behavior.  
  
** Assessment **  
- F90.9 - Attention-deficit hyperactivity disorder, unspecified type  
- F31.31 - Bipolar disorder, current episode depressed, mild  
- F43.10 - Post-traumatic stress disorder, unspecified  
  
** Therapy **  
- Developmental/Behavioral Screening & Testing - PHQ9 and 
Developmental/Behavioral   
Screening & Testing - GAD7.  
- Brief solution-focused therapy.  
- Adherent with medications.  
-  Visit 30 Minutes.  
- Plan - do not modify medication at this time. Patient would like to see if   
addressing physical health concerns helps before changing medications. 
Collaborated   
with patient and provider:  
  
** Counseling/Education **  
P offered active and supportive listening and processed current stressors.  
BHP discussed coping skills and supports that can be implemented to manage 
increased   
anxiety and stressors. BHP discussed potential of resuming counseling, even if 
for   
monthly visits to process ongoing stressors.  
BHP encouraged patient to follow-up on referrals as discussed with PCP.  
  
** Plan **  
BHP to attempt to follow-up with patient via phone in 2 weeks and at next in 
person   
visit as scheduled.  
  
** Care Team **  
- Doreen Cabrera CNP  
  
** Health Reminders **  
- Assess Tobacco Use satisfied 2024.  
- ESTHELA-2 satisfied 2024.  
- PHQ9 / PHQA satisfied 2024.  
  
** User Defined 1 **  
ESTHELA-2 score was three 2024, ESTHELA-7 score was six [ESTHELA-7] Feeling nervous, 
anxious   
or on edge? + 2 pt : More than half the days, [ESTHELA-7] Feeling nervous, anxious 
or on   
edge? + 2 pt : More than half the days, [ESTHELA-7] Not being able to stop or 
control   
worrying? + 1 pt : Several days, [ESTHELA-7] Not being able to stop or control 
worrying?   
+ 1 pt : Several days, [ESTHELA-7] Worrying too much about different things? + 1 pt 
:   
Several days, [ESTHELA-7] Trouble relaxing? + 0 pt : Not at all, [ESTHELA-7] Being so   
restless that it's hard to sit still? + 0 pt : Not at all, [ESTHELA-7] Becoming 
easily   
annoyed or irritable? + 1 pt : Several days, [ESTHELA-7] Feeling afraid as if 
something   
awful might happen? + 1 pt : Several days, Patient Health Questionnaire 9-Item 
total   
score was two 2024 If you checked off problems, how difficult is it for you
to   
do your work? + : Somewhat difficult, [PHQ-9-1] Little interest or pleasure in 
doing   
things? + 0 pt : Not at all, [PHQ-9-2] Feeling down, depressed, or hopeless? + 0
 pt :   
Not at all, [PHQ-9-3] Trouble falling or staying asleep or sleeping too much? + 
1 pt   
: Several days, [PHQ-9-4] Feeling tired or having little energy? + 0 pt : Not at
 all,   
[PHQ-9-5] Poor appetite or overeating? + 0 pt : Not at all, [PHQ-9-6] Feeling 
bad   
about yourself-or that you are a failure + 1 pt : Several days, [PHQ-9-7] 
Trouble   
concentrating on things such as reading the newspaper + 0 pt : Not at all, 
[PHQ-9-8]   
Moving or speaking so slowly that other people have noticed. + 0 pt : Not at 
all,   
[PHQ-9-9] Thoughts that you would be better off dead or hurting yourself? + 0 pt
 :   
Not at all, and ESTHELA If you checked off problems, how difficult is it for you to 
do   
your work, take care of things, or get along? Somewhat difficult.  
  
  
[facility]04- Progress note*   
  
Progress note  
  
  
  
                                Date            Encounter       Last Documented   
by  
   
                                2024      Chart Update    Last documented   
on 2024; 9:32 AM, Doreen Cabrera CNP;   
[facility]  
  
  
  
                                                      
  
  
** Active Problems & Conditions **  
- F90.9 - Attention-deficit Hyperactivity Disorder  
- F31.31 - Bipolar I Disorder, Most Recent Episode, Depressed Mild  
- F43.10 - Post-traumatic Stress Disorder  
  
** Current Medication **  
- ARIPiprazole 5 MG Oral Tablet take 1 tablet by mouth once daily, 30 days, 5 
refills  
- busPIRone HCl 5 MG Oral Tablet take 1 tablet by mouth twice daily, 30 days, 5   
refills  
- Intuniv 1 MG Oral Tablet Extended Release 24 Hour take 1 tablet by mouth once 
daily   
at bedtime, 30 days, 5 refills  
- lamoTRIgine 100 MG Oral Tablet take 1 tablet by mouth once daily, 30 days, 5   
refills  
- metFORMIN HCl 500 MG Oral Tablet AM and PM per GYN[facility] in [place], 30 days, 0
   
refills  
- MiraLax 17 GM/SCOOP Oral Powder mix 1 scoop with 8 ounces once daily, 30 days,
 2   
refills  
- Narcan 4 MG/0.1ML Nasal Liquid spray in nostril for symptoms of overdose , may
   
repeat in 2 minutes if symptoms persist, 1 days, 0 refills  
- Prazosin HCl 1 MG Oral Capsule take 1 capsule by mouth twice daily, 30 days, 5
   
refills  
- Sertraline HCl 50 MG Oral Tablet take 1 tablet by mouth once daily, 30 days, 5
   
refills  
- traZODone HCl 100 MG Oral Tablet take 1 tablet by mouth twice daily, 30 days, 
5   
refills  
  
** Past Medical/Surgical History **  
Reported:  
Has sex without a condom.  
Medical: No previous hospitalizations. Chronic illness and Sexually transmitted   
infection Partners sexually transmitted infection status known.  
Immunization History: Recent immunization for flu.  
Exposure: Exposure to COVID-19.  
Pregnancy: Previously pregnant 1 time(s) and para having 0 live birth(s). Not   
planning a pregnancy in the next year.  
Diagnoses:  
Polycystic Ovarian Syndrome (PCOS).  
Migraine headache.  
Psychiatric disorders biopolar disorder  
Anxiety disorder NOS  
Procedural:  
- Insertion of ear pressure equalization tubes in both ears  
Surgical:  
- Tonsillectomy  
- Tonsillectomy with adenoidectomy  
  
** Allergies **  
- Abilify Reaction: groggy  
- Augmentin Reaction: Shock  
- metformin Reaction: Nausea, Vomiting  
- trazodone Reaction: Panic attack  
- Zoloft Reaction: slow/ groggy  
  
** Family History **  
Paternal:  
Systemic hypertension  
Oncologic disorder  
Maternal:  
Systemic hypertension  
Epilepsy and recurrent seizures  
Psychiatric disorders  
Oncologic disorder  
Fraternal:  
Psychiatric disorders  
  
** Tests **  
Physician's Services:  
- Outside Chlamydia Test was Negative 3/27/2024  
  
** Care Team **  
- Doreen Cabrera CNP  
  
  
[facility]03- Evaluation note  
  
Includes: Assessments for all patient encounters  
  
  
  
                                Findings        Encounter       Date  
   
                                                    Bipolar affective disorder,   
current   
episode depressed, mild                  Established Patient with Leonie   
Short [facility]                             2024  
  
  
  
                                                    Last Documented On   
4 7:46PM ; [facility]  
  
  
  
                                        Post-traumatic stress disorder  Establ  
ished Patient with Leonie Short   
LISWS                                   2024  
  
  
  
                                                    Last Documented On   
4 7:46PM ; [facility]  
  
  
  
                                                    Undifferentiated attention d  
eficit   
disorder                                 Established Patient with   
Leonie Short L[facility]WS                     2024  
  
  
  
                                                    Last Documented On   
4 7:46PM ; [facility]  
  
  
  
                                        Visit for: screening for disorder  Est  
ablished Patient with Leonie   
Short LISWS                             2024  
  
  
  
                                                    Last Documented On   
4 7:46PM ; [facility]  
  
  
  
                                                    [Z68.43 - Body mass index [B  
MI]   
50.0-59.9, adult] assessment of body   
mass index                              Medical Established Patient with   
Doreen Cabrera CNP                        2024  
  
  
  
                                                    Last Documented On   
4 7:50PM ; [facility]  
  
  
  
                                        Attention-deficit hyperactivity disorder  
 Medical Established Patient with   
Doreen Cabrera CNP                        2024  
  
  
  
                                                    Last Documented On   
4 7:50PM ; [facility]  
  
  
  
                                                    Diabetes Risk Test Score was  
 three   
score 3/27/2024                         Medical Established Patient with Doreen Cabrera CNP                              2024  
  
  
  
                                                    Last Documented On   
4 7:50PM ; [facility]  
  
  
  
                                        Visit for: screening for STD Medical Est  
ablished Patient with Doreen Cabrera   
CNP                                     2024  
  
  
  
                                                    Last Documented On   
4 7:50PM ; [facility]  
  
  
  
                                                    [Z68.32 - Body mass index [B  
MI]   
32.0-32.9, adult] assessment of body   
mass index                              Medical Established Patient with   
Doreen Cabrera CNP                        2023  
  
  
  
                                                    Last Documented On   
3 6:01PM ; [facility]  
  
  
  
                                        Borderline personality disorder Medical   
Established Patient with Doreen Cabrera CNP                              2023  
  
  
  
                                                    Last Documented On   
3 6:01PM ; [facility]  
  
  
  
                                        Screening for diabetes mellitus Medical   
Established Patient with Doreen Cabrera CNP                              2023  
  
  
  
                                                    Last Documented On   
3 6:01PM ; [facility]  
  
  
  
                                        Assessment of body mass index Medical Es  
tablished Patient with Doreen Cabrera CNP                              10/21/2022  
  
  
  
                                                    Last Documented On 10/21/202  
2 2:45PM ; [facility]  
  
  
  
                                                    Bipolar affective disorder,   
current   
episode manic                            [facility]                        [facility]  
  
  
  
                                                    Last Documented On   
2 3:22PM ; [facility]  
  
  
  
                                                    Bipolar affective disorder,   
current   
episode manic                            Established Patient with [facility]lie [facility]                        2022  
  
  
  
                                                    Last Documented On   
2 2:28PM ; [facility]  
  
  
  
                                                    Bipolar affective disorder,   
current   
episode depressed, severe with   
psychosis                                [facility]behavioral Health with Haylie [facility]                        2022  
  
  
  
                                                    Last Documented On   
2 3:29PM ; [facility] [facility]  
  
  
  
                                                    Assessment of body mass inde  
x [Body   
mass index [BMI] 50.0-59.9, adult]      Open Access - Established with Julieth   
Aliya CNP                               2022  
  
  
  
                                                    Last Documented On   
2 9:36AM ; [facility]  
  
  
  
                                                    Bipolar I disorder, most rec  
ent   
episode, manic                          Open Access - Established with Julieth   
Aliya CNP                               2022  
  
  
  
                                                    Last Documented On   
2 9:36AM ; [facility]  
  
  
  
                                        Post-traumatic stress disorder BH Establ  
ished Patient with Haylie Aguilar   
LPCC-S                                  2022  
  
  
  
                                                    Last Documented On   
2 3:20PM ; [facility]  
  
  
  
                                No cough        Medical Established Patient with  
 Doreen Deborah CNP 2022  
  
  
  
                                                    Last Documented On   
2 4:04PM ; [facility]  
  
  
  
                                                    Z68.43 - Body mass index [BM  
I]   
50.0-59.9, adult                        Medical Established Patient with   
Doreen Deborah CNP                        2022  
  
  
  
                                                    Last Documented On   
2 4:04PM ; [facility]  
  
  
  
                                                    Borderline personality disor  
danny Pt   
reported hx of sx/dx                    BH Established Patient with Haylie   
Aguilar LPCC-S                        2022  
  
  
  
                                                    Last Documented On   
2 4:08PM ; [facility]  
  
  
  
                                                    Assessment of body mass inde  
x [Body   
mass index [BMI] 50.0-59.9, adult]      Open Access - Established with Julieth   
Aliya CNP                               2022  
  
  
  
                                                    Last Documented On   
2 7:41PM ; [facility]  
  
  
  
                                                    Diabetes Risk Test Score was  
 three   
score 2022                         Open Access - Established with Julieth   
Aliya CNP                               2022  
  
  
  
                                                    Last Documented On   
2 7:41PM ; [facility]  
  
  
  
                                                    Bipolar I disorder, most rec  
ent   
episode, manic                          BH Established Patient with Avis   
Felder LPCC-S                          2021  
  
  
  
                                                    Last Documented On   
1 1:35AM ; [facility]  
  
  
  
                                        Borderline personality disorder BH Estab  
lished Patient with Avis   
Felder LPCC-S                          2021  
  
  
  
                                                    Last Documented On   
1 1:35AM ; [facility]  
  
  
  
                                        Post-traumatic stress disorder BH Establ  
ished Patient with Avis   
Felder LPCC-S                          2021  
  
  
  
                                                    Last Documented On   
1 1:35AM ; [facility]  
  
  
  
                                                    Assessment of visit for: bhargavi guevaraning for   
human immunodeficiency virus            Medical Established Patient with   
Doreen Deborah CNP                        2021  
  
  
  
                                                    Last Documented On   
1 2:56PM ; [facility]  
  
  
  
                                Nicotine dependence Medical Established Patient   
with Doreen Deborah CNP 2021  
  
  
  
                                                    Last Documented On   
1 2:56PM ; [facility]  
  
  
  
                                Tachycardia     Medical Established Patient with  
 Doreen Deborah CNP 2021  
  
  
  
                                                    Last Documented On   
1 2:56PM ; [facility]  
  
  
  
                                                    Z68.43 - Body mass index [BM  
I]   
50.0-59.9, adult                        Medical Established Patient with   
Doreen Deborah CNP                        2021  
  
  
  
                                                    Last Documented On   
1 2:56PM ; [facility]  
  
  
  
                                                    Bipolar I disorder, most rec  
ent   
episode, manic                           Established Patient with Leonie   
Short LISWS                             2021  
  
  
  
                                                    Last Documented On   
1 10:17AM ; [facility]  
  
  
  
                                                    Borderline personality disor  
danny per   
patient reported history                 Established Patient with Leonie   
Short LISWS                             2021  
  
  
  
                                                    Last Documented On   
1 10:17AM ; [facility]  
  
  
  
                                Nicotine dependence  Established Patient with   
Leonie Short LISWS 2021  
  
  
  
                                                    Last Documented On   
1 10:17AM ; [facility]  
  
  
  
                                        Post-traumatic stress disorder BH Establ  
ished Patient with Leonie Short   
LISWS                                   2021  
  
  
  
                                                    Last Documented On   
1 10:17AM ; [facility]  
  
  
  
                                Body mass index Medical Established Patient with  
 Doreen Deborah CNP 2021  
  
  
  
                                                    Last Documented On   
1 5:23PM ; [facility]  
  
  
  
                                Morbid obesity  Medical Established Patient with  
 Doreen Deborah CNP 2021  
  
  
  
                                                    Last Documented On   
1 5:23PM ; [facility]  
  
  
  
                                        Nicotine dependence uncomplicated Medica  
l Established Patient with Doreen   
Deborah CNP                              2021  
  
  
  
                                                    Last Documented On   
1 5:23PM ; [facility]  
  
  
  
                                                    Z68.42 - Body mass index [BM  
I]   
45.0-49.9, adult                        Medical Established Patient with   
Doreen Deborah CNP                        2021  
  
  
  
                                                    Last Documented On   
1 5:23PM ; [facility]  
  
  
  
                                Episodic mood disorders On Call with Pamela Becky edwards CNP 2021  
  
  
  
                                                    Last Documented On   
1 7:49AM ; [facility]  
  
  
  
                                                    Mood disorders, NOS per tabatha  
ent   
reported history                         Established Patient with Leonie   
Short LISWS                             2021  
  
  
  
                                                    Last Documented On   
1 7:15PM ; [facility]  
  
  
  
                                        Post-traumatic stress disorder BH Establ  
ished Patient with Leonie Short   
LISWS                                   2021  
  
  
  
                                                    Last Documented On   
1 7:15PM ; [facility]  
  
  
  
                                Morbid obesity  Medical Established Patient with  
 Doreen Deborah CNP 2021  
  
  
  
                                                    Last Documented On   
1 12:31PM ; [facility]  
  
  
  
                                Otitis externa  Medical Established Patient with  
 Doreen Deborah CNP 2021  
  
  
  
                                                    Last Documented On   
1 12:31PM ; [facility]  
  
  
  
                                                    Z68.42 - Body mass index [BM  
I]   
45.0-49.9, adult                        Medical Established Patient with   
Doreen Deborah CNP                        2021  
  
  
  
                                                    Last Documented On   
1 12:31PM ; [facility]  
  
  
  
                                        Post-traumatic stress disorder  Establ  
ished Patient with Leonie Short   
LISWS                                   06/10/2021  
  
  
  
                                                    Last Documented On 06/10/202  
1 10:05PM ; [facility]  
  
  
  
                                Morbid obesity  Medical Established Patient with  
 Doreen Deborah CNP 06/10/2021  
  
  
  
                                                    Last Documented On 06/10/202  
1 4:45PM ; [facility]  
  
  
  
                                                    Z68.42 - Body mass index [BM  
I]   
45.0-49.9, adult                        Medical Established Patient with   
Doreen Deborah CNP                        06/10/2021  
  
  
  
                                                    Last Documented On 06/10/202  
1 4:45PM ; [facility]  
  
  
  
                                        Post-traumatic stress disorder  Establ  
ished Patient with Leonie Short   
LISWS                                   2021  
  
  
  
                                                    Last Documented On   
1 11:59AM ; [facility]  
  
  
  
                                                    Assessment of visit for: scr  
eening for   
human immunodeficiency virus            Medical New Patient with Doreen   
Deborah CNP                              2021  
  
  
  
                                                    Last Documented On   
1 4:06PM ; [facility]  
  
  
  
                                                    Diabetes Risk Test Score was  
 one score   
2021                               Medical New Patient with Doreen Cabrera CNP                              2021  
  
  
  
                                                    Last Documented On   
1 4:06PM ; [facility]  
  
  
  
                                Hypertension    Medical New Patient with Doreenpaola esposito CNP 2021  
  
  
  
                                                    Last Documented On   
1 4:06PM ; [facility]  
  
  
  
                                Morbid obesity  Medical New Patient with Doreen DAVID esposito CNP 2021  
  
  
  
                                                    Last Documented On   
1 4:06PM ; [facility]  
  
  
  
                                Post-traumatic stress disorder Medical New Patie  
nt with Doreenpaola Mccormacken CNP   
2021  
  
  
  
                                                    Last Documented On   
1 4:06PM ; [facility]  
  
  
  
                                                    Z68.42 - Body mass index [BM  
I]   
45.0-49.9, adult                        Medical New Patient with Doreenpaola Cabrera CNP                              2021  
  
  
  
                                                    Last Documented On   
1 4:06PM ; [facility]  
Work Phone: [phone]- Evaluation note  
  
Includes: Assessments for all patient encounters  
  
  
  
                                Findings        Encounter       Date  
   
                                                    Bipolar affective disorder,   
current   
episode depressed, mild                  Established Patient with Leonie   
Short LISWS                             2024  
  
  
  
                                                    Last Documented On   
4 5:16PM ; [facility]  
  
  
  
                                        Post-traumatic stress disorder  Establ  
ished Patient with Leonie Short   
LISWS                                   2024  
  
  
  
                                                    Last Documented On   
4 5:16PM ; [facility]  
  
  
  
                                                    Undifferentiated attention d  
eficit   
disorder                                 Established Patient with   
Leonie Short LISWS                     2024  
  
  
  
                                                    Last Documented On   
4 5:16PM ; [facility]  
  
  
  
                                        Visit for: screening for disorder  Est  
ablished Patient with Leonie   
Short LISWS                             2024  
  
  
  
                                                    Last Documented On   
4 5:16PM ; [facility]  
  
  
  
                                                    [Z68.43 - Body mass index [B  
MI]   
50.0-59.9, adult] assessment of body   
mass index                              Medical Established Patient with   
Doreen Deborah CNP                        2024  
  
  
  
                                                    Last Documented On   
4 7:50PM ; [facility]  
  
  
  
                                        Attention-deficit hyperactivity disorder  
 Medical Established Patient with   
Doreen Deborah CNP                        2024  
  
  
  
                                                    Last Documented On   
4 7:50PM ; [facility]  
  
  
  
                                                    Diabetes Risk Test Score was  
 three   
score 3/27/2024                         Medical Established Patient with Doreen Cabrera CNP                              2024  
  
  
  
                                                    Last Documented On   
4 7:50PM ; [facility]  
  
  
  
                                        Visit for: screening for STD Medical Est  
ablished Patient with Doreen Cabrera   
CNP                                     2024  
  
  
  
                                                    Last Documented On   
4 7:50PM ; [facility]  
  
  
  
                                                    [Z68.32 - Body mass index [B  
MI]   
32.0-32.9, adult] assessment of body   
mass index                              Medical Established Patient with   
Doreen Cabrera CNP                        2023  
  
  
  
                                                    Last Documented On   
3 6:01PM ; [facility]  
  
  
  
                                        Borderline personality disorder Medical   
Established Patient with Doreen Cabrera [facility]                              2023  
  
  
  
                                                    Last Documented On   
3 6:01PM ; [facility]  
  
  
  
                                        Screening for diabetes mellitus Medical   
Established Patient with Doreen Cabrera [facility]                              2023  
  
  
  
                                                    Last Documented On   
3 6:01PM ; [facility]  
  
  
  
                                        Assessment of body mass index Medical Es  
tablished Patient with Doreen Cabrera CNP                              10/21/2022  
  
  
  
                                                    Last Documented On 10/21/202  
2 2:45PM ; [facility]  
  
  
  
                                                    Bipolar affective disorder,   
current   
episode manic                            [facility] with Haylienicanor Aguilar LPCC-S                        2022  
  
  
  
                                                    Last Documented On   
2 3:22PM ; [facility]  
  
  
  
                                                    Bipolar affective disorder,   
current   
episode manic                            Established Patient with Haylie   
Aguilar LPCC-S                        2022  
  
  
  
                                                    Last Documented On   
2 2:28PM ; [facility]  
  
  
  
                                                    Bipolar affective disorder,   
current   
episode depressed, severe with   
psychosis                                [facility] with Haylie   
Aguilar LPCC-S                        2022  
  
  
  
                                                    Last Documented On   
2 3:29PM ; [facility]  
  
  
  
                                                    Assessment of body mass inde  
x [Body   
mass index [BMI] 50.0-59.9, adult]      Open Access - Established with Julieth Pisano CNP                               2022  
  
  
  
                                                    Last Documented On   
2 9:36AM ; [facility]  
  
  
  
                                                    Bipolar I disorder, most rec  
ent   
episode, manic                          Open Access - Established with Julieth   
Aliya CNP                               2022  
  
  
  
                                                    Last Documented On   
2 9:36AM ; [facility]  
  
  
  
                                        Post-traumatic stress disorder  Establ  
ished Patient with Haylienicanor MartinAguilar   
LPCC-S                                  2022  
  
  
  
                                                    Last Documented On   
2 3:20PM ; [facility]  
  
  
  
                                No cough        Medical Established Patient with  
 Doreen Deborah CNP 2022  
  
  
  
                                                    Last Documented On   
2 4:04PM ; [facility]  
  
  
  
                                                    Z68.43 - Body mass index [BM  
I]   
50.0-59.9, adult                        Medical Established Patient with   
Doreen Deborah CNP                        2022  
  
  
  
                                                    Last Documented On   
2 4:04PM ; [facility]  
  
  
  
                                                    Borderline personality disor  
danny Pt   
reported hx of sx/dx                     Established Patient with Haylie   
Aguilar LPCC-S                        2022  
  
  
  
                                                    Last Documented On   
2 4:08PM ; [facility]  
  
  
  
                                                    Assessment of body mass inde  
x [Body   
mass index [BMI] 50.0-59.9, adult]      Open Access - Established with Julieth   
Aliya CNP                               2022  
  
  
  
                                                    Last Documented On   
2 7:41PM ; [facility]  
  
  
  
                                                    Diabetes Risk Test Score was  
 three   
score 2022                         Open Access - Established with Julieth   
Aliya CNP                               2022  
  
  
  
                                                    Last Documented On   
2 7:41PM ; [facility]  
  
  
  
                                                    Bipolar I disorder, most rec  
ent   
episode, manic                           Established Patient with Avis   
Felder LPCC-S                          2021  
  
  
  
                                                    Last Documented On   
1 1:35AM ; [facility]  
  
  
  
                                        Borderline personality disorder  Estab  
lished Patient with Avis   
Felder LPCC-S                          2021  
  
  
  
                                                    Last Documented On   
1 1:35AM ; [facility]  
  
  
  
                                        Post-traumatic stress disorder  Establ  
ished Patient with Avis   
Felder LPCC-S                          2021  
  
  
  
                                                    Last Documented On   
1 1:35AM ; [facility]  
  
  
  
                                                    Assessment of visit for: scr  
eening for   
human immunodeficiency virus            Medical Established Patient with   
Doreen Deborah CNP                        2021  
  
  
  
                                                    Last Documented On   
1 2:56PM ; [facility]  
  
  
  
                                Nicotine dependence Medical Established Patient   
with Doreen Deborah CNP 2021  
  
  
  
                                                    Last Documented On   
1 2:56PM ; [facility]  
  
  
  
                                Tachycardia     Medical Established Patient with  
 Doreen Deborah CNP 2021  
  
  
  
                                                    Last Documented On   
1 2:56PM ; [facility]  
  
  
  
                                                    Z68.43 - Body mass index [BM  
I]   
50.0-59.9, adult                        Medical Established Patient with   
Doreen Deborah CNP                        2021  
  
  
  
                                                    Last Documented On   
1 2:56PM ; [facility]  
  
  
  
                                                    Bipolar I disorder, most rec  
ent   
episode, manic                           Established Patient with Leonie   
Short LISWS                             2021  
  
  
  
                                                    Last Documented On   
1 10:17AM ; [facility]  
  
  
  
                                                    Borderline personality disor  
danny per   
patient reported history                 Established Patient with Leonie   
Short LISWS                             2021  
  
  
  
                                                    Last Documented On   
1 10:17AM ; [facility]  
  
  
  
                                Nicotine dependence  Established Patient with   
Leonie Short LISWS 2021  
  
  
  
                                                    Last Documented On   
1 10:17AM ; [facility]  
  
  
  
                                        Post-traumatic stress disorder BH Establ  
ished Patient with Leonie Short   
LISWS                                   2021  
  
  
  
                                                    Last Documented On   
1 10:17AM ; [facility]  
  
  
  
                                Body mass index Medical Established Patient with  
 Doreen Deborah CNP 2021  
  
  
  
                                                    Last Documented On   
1 5:23PM ; [facility]  
  
  
  
                                Morbid obesity  Medical Established Patient with  
 Doreen Deborah CNP 2021  
  
  
  
                                                    Last Documented On   
1 5:23PM ; [facility]  
  
  
  
                                        Nicotine dependence uncomplicated Medica  
l Established Patient with Doreen   
Deborah CNP                              2021  
  
  
  
                                                    Last Documented On   
1 5:23PM ; [facility]  
  
  
  
                                                    Z68.42 - Body mass index [BM  
I]   
45.0-49.9, adult                        Medical Established Patient with   
Doreen Deborah CNP                        2021  
  
  
  
                                                    Last Documented On   
1 5:23PM ; [facility]  
  
  
  
                                Episodic mood disorders On Call with Pamela edwards CNP 2021  
  
  
  
                                                    Last Documented On   
1 7:49AM ; [facility]  
  
  
  
                                                    Mood disorders, NOS per tabatha  
ent   
reported history                         Established Patient with Leonie   
Short LISWS                             2021  
  
  
  
                                                    Last Documented On   
1 7:15PM ; [facility]  
  
  
  
                                        Post-traumatic stress disorder  Establ  
ished Patient with Leonie Short   
LISWS                                   2021  
  
  
  
                                                    Last Documented On   
1 7:15PM ; [facility]  
  
  
  
                                Morbid obesity  Medical Established Patient with  
 Doreen Deborah CNP 2021  
  
  
  
                                                    Last Documented On   
1 12:31PM ; [facility]  
  
  
  
                                Otitis externa  Medical Established Patient with  
 Doreen Deborah CNP 2021  
  
  
  
                                                    Last Documented On   
1 12:31PM ; [facility]  
  
  
  
                                                    Z68.42 - Body mass index [BM  
I]   
45.0-49.9, adult                        Medical Established Patient with   
Doreen Deborah CNP                        2021  
  
  
  
                                                    Last Documented On   
1 12:31PM ; [facility]  
  
  
  
                                        Post-traumatic stress disorder  Establ  
ished Patient with Leonie Short   
LISWS                                   06/10/2021  
  
  
  
                                                    Last Documented On 06/10/202  
1 10:05PM ; [facility]  
  
  
  
                                Morbid obesity  Medical Established Patient with  
 Doreen Deborah CNP 06/10/2021  
  
  
  
                                                    Last Documented On 06/10/202  
1 4:45PM ; [facility]  
  
  
  
                                                    Z68.42 - Body mass index [BM  
I]   
45.0-49.9, adult                        Medical Established Patient with   
Doreen Deborah CNP                        06/10/2021  
  
  
  
                                                    Last Documented On 06/10/202  
1 4:45PM ; [facility]  
  
  
  
                                        Post-traumatic stress disorder  Establ  
ished Patient with Leonie Short   
LISWS                                   2021  
  
  
  
                                                    Last Documented On   
1 11:59AM ; [facility]  
  
  
  
                                                    Assessment of visit for: bhargavi myles for   
human immunodeficiency virus            Medical New Patient with Doreen Cabrera CNP                              2021  
  
  
  
                                                    Last Documented On   
1 4:06PM ; [facility]  
  
  
  
                                                    Diabetes Risk Test Score was  
 one score   
2021                               Medical New Patient with Doreenpaola Cabrera CNP                              2021  
  
  
  
                                                    Last Documented On   
1 4:06PM ; [facility]  
  
  
  
                                Hypertension    Medical New Patient with Doreen esposito CNP 2021  
  
  
  
                                                    Last Documented On   
1 4:06PM ; [facility]  
  
  
  
                                Morbid obesity  Medical New Patient with Doreenpaola esposito CNP 2021  
  
  
  
                                                    Last Documented On   
1 4:06PM ; [facility]  
  
  
  
                                Post-traumatic stress disorder Medical New Patie  
nt with Doreenpaola Cabrera CNP   
2021  
  
  
  
                                                    Last Documented On   
1 4:06PM ; [facility]  
  
  
  
                                                    Z68.42 - Body mass index [BM  
I]   
45.0-49.9, adult                        Medical New Patient with Doreenpaola Cabrera CNP                              2021  
  
  
  
                                                    Last Documented On   
1 4:06PM ; [facility]  
Work Phone: [phone]- Evaluation note  
  
Includes: Assessments for all patient encounters  
  
  
  
                                Findings        Encounter       Date  
   
                                                    Bipolar affective disorder,   
current   
episode depressed, mild                  Established Patient with Leonie   
Short LISWS                             2024  
  
  
  
                                                    Last Documented On   
4 5:16PM ; [facility]  
  
  
  
                                        Post-traumatic stress disorder BH Establ  
ished Patient with Leonie Short   
LISWS                                   2024  
  
  
  
                                                    Last Documented On   
4 5:16PM ; [facility]  
  
  
  
                                                    Undifferentiated attention d  
eficit   
disorder                                 Established Patient with   
Leonie Short LISWS                     2024  
  
  
  
                                                    Last Documented On   
4 5:16PM ; [facility]  
  
  
  
                                        Visit for: screening for disorder  Est  
ablished Patient with Leonie   
Short LISWS                             2024  
  
  
  
                                                    Last Documented On   
4 5:16PM ; [facility]  
  
  
  
                                                    [Z68.43 - Body mass index [B  
MI]   
50.0-59.9, adult] assessment of body   
mass index                              Medical Established Patient with   
Doreenpaola Cabrera CNP                        2024  
  
  
  
                                                    Last Documented On   
4 7:50PM ; [facility]  
  
  
  
                                        Attention-deficit hyperactivity disorder  
 Medical Established Patient with   
Doreen Deborah CNP                        2024  
  
  
  
                                                    Last Documented On   
4 7:50PM ; [facility]  
  
  
  
                                                    Diabetes Risk Test Score was  
 three   
score 3/27/2024                         Medical Established Patient with Doreen   
Deborah CNP                              2024  
  
  
  
                                                    Last Documented On   
4 7:50PM ; [facility]  
  
  
  
                                        Visit for: screening for STD Medical Est  
ablished Patient with Doreen Cabrera   
CNP                                     2024  
  
  
  
                                                    Last Documented On   
4 7:50PM ; [facility]  
  
  
  
                                                    [Z68.32 - Body mass index [B  
MI]   
32.0-32.9, adult] assessment of body   
mass index                              Medical Established Patient with   
Doreen Cabrera [facility]                        2023  
  
  
  
                                                    Last Documented On   
3 6:01PM ; [facility]  
  
  
  
                                        Borderline personality disorder Medical   
Established Patient with Doreen Cabrera CNP                              2023  
  
  
  
                                                    Last Documented On   
3 6:01PM ; [facility]  
  
  
  
                                        Screening for diabetes mellitus Medical   
Established Patient with Doreen Cabrera CNP                              2023  
  
  
  
                                                    Last Documented On   
3 6:01PM ; [facility]  
  
  
  
                                        Assessment of body mass index Medical Es  
tablished Patient with Doreen Cabrera [facility]                              10/21/2022  
  
  
  
                                                    Last Documented On 10/21/202  
2 2:45PM ; [facility]  
  
  
  
                                                    Bipolar affective disorder,   
current   
episode manic                            [facility] with Haylienicanor Aguilar LPCC-S                        2022  
  
  
  
                                                    Last Documented On   
2 3:22PM ; [facility]  
  
  
  
                                                    Bipolar affective disorder,   
current   
episode manic                            Established Patient with Haylienicanor Aguilar LPCC-S                        2022  
  
  
  
                                                    Last Documented On   
2 2:28PM ; [facility]  
  
  
  
                                                    Bipolar affective disorder,   
current   
episode depressed, severe with   
psychosis                                Tele[facility] with Haylienicanor Martinerson LPCC-S                        2022  
  
  
  
                                                    Last Documented On   
2 3:29PM ; [facility]  
  
  
  
                                                    Assessment of body mass inde  
x [Body   
mass index [BMI] 50.0-59.9, adult]      Open Access - Established with Julieth Pisano CNP                               2022  
  
  
  
                                                    Last Documented On   
2 9:36AM ; [facility]  
  
  
  
                                                    Bipolar I disorder, most rec  
ent   
episode, manic                          Open Access - Established with Julieth   
Aliya CNP                               2022  
  
  
  
                                                    Last Documented On   
2 9:36AM ; [facility]  
  
  
  
                                        Post-traumatic stress disorder  Establ  
ished Patient with Haylie Aguilar   
LPCC-S                                  2022  
  
  
  
                                                    Last Documented On   
2 3:20PM ; [facility]  
  
  
  
                                No cough        Medical Established Patient with  
 Doreenpaola Cabrera CNP 2022  
  
  
  
                                                    Last Documented On   
2 4:04PM ; [facility]  
  
  
  
                                                    Z68.43 - Body mass index [BM  
I]   
50.0-59.9, adult                        Medical Established Patient with   
Doreen Cabrera CNP                        2022  
  
  
  
                                                    Last Documented On   
2 4:04PM ; [facility]  
  
  
  
                                                    Borderline personality disor  
danny Pt   
reported hx of sx/dx                     Established Patient with Haylienicanor Martinerson LPCC-S                        2022  
  
  
  
                                                    Last Documented On   
2 4:08PM ; [facility]  
  
  
  
                                                    Assessment of body mass inde  
x [Body   
mass index [BMI] 50.0-59.9, adult]      Open Access - Established with Julieth Pisano CNP                               2022  
  
  
  
                                                    Last Documented On   
2 7:41PM ; [facility]  
  
  
  
                                                    Diabetes Risk Test Score was  
 three   
score 2022                         Open Access - Established with Julieth   
Aliya CNP                               2022  
  
  
  
                                                    Last Documented On   
2 7:41PM ; [facility]  
  
  
  
                                                    Bipolar I disorder, most rec  
ent   
episode, manic                           Established Patient with Avis   
Felder LPCC-S                          2021  
  
  
  
                                                    Last Documented On   
1 1:35AM ; [facility]  
  
  
  
                                        Borderline personality disorder  Estab  
lished Patient with Avis   
Felder LPCC-S                          2021  
  
  
  
                                                    Last Documented On   
1 1:35AM ; [facility]  
  
  
  
                                        Post-traumatic stress disorder  Establ  
ished Patient with Avis   
Felder LPCC-S                          2021  
  
  
  
                                                    Last Documented On   
1 1:35AM ; [facility]  
  
  
  
                                                    Assessment of visit for: bhargavi myles for   
human immunodeficiency virus            Medical Established Patient with   
Doreen Cabrera CNP                        2021  
  
  
  
                                                    Last Documented On   
1 2:56PM ; [facility]  
  
  
  
                                Nicotine dependence Medical Established Patient   
with Doreenpaola Cabrera CNP 2021  
  
  
  
                                                    Last Documented On   
1 2:56PM ; [facility]  
  
  
  
                                Tachycardia     Medical Established Patient with  
 Doreen Deborah CNP 2021  
  
  
  
                                                    Last Documented On   
1 2:56PM ; [facility]  
  
  
  
                                                    Z68.43 - Body mass index [BM  
I]   
50.0-59.9, adult                        Medical Established Patient with   
Doreen Deborah CNP                        2021  
  
  
  
                                                    Last Documented On   
1 2:56PM ; [facility]  
  
  
  
                                                    Bipolar I disorder, most rec  
ent   
episode, manic                           Established Patient with Leonie   
Short LISWS                             2021  
  
  
  
                                                    Last Documented On   
1 10:17AM ; [facility]  
  
  
  
                                                    Borderline personality disor  
danny per   
patient reported history                 Established Patient with Leonie   
Short LISWS                             2021  
  
  
  
                                                    Last Documented On   
1 10:17AM ; [facility]  
  
  
  
                                Nicotine dependence  Established Patient with   
Leonie Short LISWS 2021  
  
  
  
                                                    Last Documented On   
1 10:17AM ; [facility]  
  
  
  
                                        Post-traumatic stress disorder  Establ  
ished Patient with Leonie Short   
LISWS                                   2021  
  
  
  
                                                    Last Documented On   
1 10:17AM ; [facility]  
  
  
  
                                Body mass index Medical Established Patient with  
 Doreen Deborah CNP 2021  
  
  
  
                                                    Last Documented On   
1 5:23PM ; [facility]  
  
  
  
                                Morbid obesity  Medical Established Patient with  
 Doreen Deborah CNP 2021  
  
  
  
                                                    Last Documented On   
1 5:23PM ; [facility]  
  
  
  
                                        Nicotine dependence uncomplicated Medica  
l Established Patient with Doreen   
Deborah CNP                              2021  
  
  
  
                                                    Last Documented On   
1 5:23PM ; [facility]  
  
  
  
                                                    Z68.42 - Body mass index [BM  
I]   
45.0-49.9, adult                        Medical Established Patient with   
Doreen Deborah CNP                        2021  
  
  
  
                                                    Last Documented On   
1 5:23PM ; [facility]  
  
  
  
                                Episodic mood disorders On Call with Pamela edwards CNP 2021  
  
  
  
                                                    Last Documented On   
1 7:49AM ; [facility]  
  
  
  
                                                    Mood disorders, NOS per tabatha  
ent   
reported history                         Established Patient with Leonie   
Short LISWS                             2021  
  
  
  
                                                    Last Documented On   
1 7:15PM ; [facility]  
  
  
  
                                        Post-traumatic stress disorder  Establ  
ished Patient with Leonie Short   
LISWS                                   2021  
  
  
  
                                                    Last Documented On   
1 7:15PM ; [facility]  
  
  
  
                                Morbid obesity  Medical Established Patient with  
 Doreen Deborah CNP 2021  
  
  
  
                                                    Last Documented On   
1 12:31PM ; [facility]  
  
  
  
                                Otitis externa  Medical Established Patient with  
 Doreen Deborah CNP 2021  
  
  
  
                                                    Last Documented On   
1 12:31PM ; [facility]  
  
  
  
                                                    Z68.42 - Body mass index [BM  
I]   
45.0-49.9, adult                        Medical Established Patient with   
Doreen Deborah CNP                        2021  
  
  
  
                                                    Last Documented On   
1 12:31PM ; [facility]  
  
  
  
                                        Post-traumatic stress disorder  Establ  
ished Patient with Leonie Short   
LISWS                                   06/10/2021  
  
  
  
                                                    Last Documented On 06/10/202  
1 10:05PM ; [facility]  
  
  
  
                                Morbid obesity  Medical Established Patient with  
 Doreen Deborah CNP 06/10/2021  
  
  
  
                                                    Last Documented On 06/10/202  
1 4:45PM ; [facility]  
  
  
  
                                                    Z68.42 - Body mass index [BM  
I]   
45.0-49.9, adult                        Medical Established Patient with   
Doreen Deborah CNP                        06/10/2021  
  
  
  
                                                    Last Documented On 06/10/202  
1 4:45PM ; [facility]  
  
  
  
                                        Post-traumatic stress disorder  Establ  
ished Patient with Leonie Short   
LISWS                                   2021  
  
  
  
                                                    Last Documented On   
1 11:59AM ; [facility]  
  
  
  
                                                    Assessment of visit for: bhargavi myles for   
human immunodeficiency virus            Medical New Patient with Doreen Cabrera CNP                              2021  
  
  
  
                                                    Last Documented On   
1 4:06PM ; [facility]  
  
  
  
                                                    Diabetes Risk Test Score was  
 one score   
2021                               Medical New Patient with Doreenpaola Cabrera CNP                              2021  
  
  
  
                                                    Last Documented On   
1 4:06PM ; [facility]  
  
  
  
                                Hypertension    Medical New Patient with Doreenpaola almonteen CNP 2021  
  
  
  
                                                    Last Documented On   
1 4:06PM ; [facility]  
  
  
  
                                Morbid obesity  Medical New Patient with Doreen C  
cate CNP 2021  
  
  
  
                                                    Last Documented On   
1 4:06PM ; [facility]  
  
  
  
                                Post-traumatic stress disorder Medical New Patie  
nt with Doreen Cabrera CNP   
2021  
  
  
  
                                                    Last Documented On   
1 4:06PM ; [facility]  
  
  
  
                                                    Z68.42 - Body mass index [BM  
I]   
45.0-49.9, adult                        Medical New Patient with Doreen Cabrera CNP                              2021  
  
  
  
                                                    Last Documented On   
1 4:06PM ; [facility]  
  
[facility]  
Work Phone: [phone]- History general Narrative - Reported  
  
Includes: Medical History in patient's chart  
  
  
  
                                        Description         Last Updated  
   
                                        0 previous live birth(s) 2024  
  
  
  
                                                    Last Documented On   
4 7:50PM ; [facility]  
  
  
  
                                        Previously pregnant 1 time(s) 2024  
  
  
  
                                                    Last Documented On   
4 7:50PM ; [facility]  
  
  
  
                                        Recent immunization for flu 2024  
  
  
  
                                                    Last Documented On   
4 7:50PM ; [facility]  
  
  
  
                                        Has sex without a condom 2022  
  
  
  
                                                    Last Documented On   
2 4:04PM ; [facility]  
  
  
  
                                        Not planning to have a baby in the next   
12 months 2022  
  
  
  
                                                    Last Documented On   
2 4:04PM ; [facility]  
  
  
  
                                        Partners sexually transmitted infection   
status known 2022  
  
  
  
                                                    Last Documented On   
2 4:04PM ; [facility]  
  
  
  
                                        No previous hospitalizations 2022  
  
  
  
                                                    Last Documented On   
2 4:04PM ; [facility]  
  
  
  
                                        Chronic illness     2021  
  
  
  
                                                    Last Documented On   
1 7:49AM ; [facility]  
  
  
  
                                        Exposure to COVID-19 2021  
  
  
  
                                                    Last Documented On   
1 12:31PM ; [facility]  
  
  
  
                                        History of gynecologic disorder 20  
21  
  
  
  
                                                    Last Documented On   
1 4:06PM ; [facility]  
  
  
  
                                        History of Polycystic Ovarian Syndrome (  
PCOS) 2021  
  
  
  
                                                    Last Documented On   
1 4:06PM ; [facility]  
  
  
  
                                        History of anxiety disorder NOS 20  
21  
  
  
  
                                                    Last Documented On   
1 4:06PM ; [facility]  
  
  
  
                                        History of migraine headache 2021  
  
  
  
                                                    Last Documented On   
1 4:06PM ; [facility]  
  
  
  
                                        History of psychiatric disorders biopola  
r disorder 2021  
  
  
  
                                                    Last Documented On   
1 4:06PM ; [facility]  
  
Health [facility]  
Work Phone: [phone]- History general Narrative - Reported  
  
Includes: Medical History in patient's chart  
  
  
  
                                        Description         Last Updated  
   
                                        0 previous live birth(s) 2024  
  
  
  
                                                    Last Documented On   
4 7:50PM ; [facility]  
  
  
  
                                        Previously pregnant 1 time(s) 2024  
  
  
  
                                                    Last Documented On   
4 7:50PM ; [facility]  
  
  
  
                                        Recent immunization for flu 2024  
  
  
  
                                                    Last Documented On   
4 7:50PM ; [facility]  
  
  
  
                                        Has sex without a condom 2022  
  
  
  
                                                    Last Documented On   
2 4:04PM ; [facility]  
  
  
  
                                        Not planning to have a baby in the next   
12 months 2022  
  
  
  
                                                    Last Documented On   
2 4:04PM ; [facility]  
  
  
  
                                        Partners sexually transmitted infection   
status known 2022  
  
  
  
                                                    Last Documented On   
2 4:04PM ; [facility]  
  
  
  
                                        No previous hospitalizations 2022  
  
  
  
                                                    Last Documented On   
2 4:04PM ; [facility]  
  
  
  
                                        Chronic illness     2021  
  
  
  
                                                    Last Documented On   
1 7:49AM ; [facility]  
  
  
  
                                        Exposure to COVID-19 2021  
  
  
  
                                                    Last Documented On   
1 12:31PM ; [facility]  
  
  
  
                                        History of gynecologic disorder 20  
21  
  
  
  
                                                    Last Documented On   
1 4:06PM ; [facility]  
  
  
  
                                        History of Polycystic Ovarian Syndrome (  
PCOS) 2021  
  
  
  
                                                    Last Documented On   
1 4:06PM ; [facility]  
  
  
  
                                        History of anxiety disorder NOS 20  
21  
  
  
  
                                                    Last Documented On   
1 4:06PM ; [facility]  
  
  
  
                                        History of migraine headache 2021  
  
  
  
                                                    Last Documented On   
1 4:06PM ; [facility]  
  
  
  
                                        History of psychiatric disorders biopola  
r disorder 2021  
  
  
  
                                                    Last Documented On   
1 4:06PM ; [facility]  
  
Health [facility]  
Work Phone: [phone]- History general Narrative - Reported  
  
Includes: Medical History in patient's chart  
  
  
  
                                        Description         Last Updated  
   
                                        0 previous live birth(s) 2024  
  
  
  
                                                    Last Documented On   
4 7:50PM ; [facility]  
  
  
  
                                        Previously pregnant 1 time(s) 2024  
  
  
  
                                                    Last Documented On   
4 7:50PM ; [facility]  
  
  
  
                                        Recent immunization for flu 2024  
  
  
  
                                                    Last Documented On   
4 7:50PM ; [facility]  
  
  
  
                                        Has sex without a condom 2022  
  
  
  
                                                    Last Documented On   
2 4:04PM ; [facility]  
  
  
  
                                        Not planning to have a baby in the next   
12 months 2022  
  
  
  
                                                    Last Documented On   
2 4:04PM ; [facility]  
  
  
  
                                        Partners sexually transmitted infection   
status known 2022  
  
  
  
                                                    Last Documented On   
2 4:04PM ; [facility]  
  
  
  
                                        No previous hospitalizations 2022  
  
  
  
                                                    Last Documented On   
2 4:04PM ; [facility]  
  
  
  
                                        Chronic illness     2021  
  
  
  
                                                    Last Documented On   
1 7:49AM ; [facility]  
  
  
  
                                        Exposure to COVID-19 2021  
  
  
  
                                                    Last Documented On   
1 12:31PM ; [facility]  
  
  
  
                                        History of gynecologic disorder 20  
21  
  
  
  
                                                    Last Documented On   
1 4:06PM ; [facility]  
  
  
  
                                        History of Polycystic Ovarian Syndrome (  
PCOS) 2021  
  
  
  
                                                    Last Documented On   
1 4:06PM ; [facility]  
  
  
  
                                        History of anxiety disorder NOS 20  
21  
  
  
  
                                                    Last Documented On   
1 4:06PM ; [facility]  
  
  
  
                                        History of migraine headache 2021  
  
  
  
                                                    Last Documented On   
1 4:06PM ; [facility]  
  
  
  
                                        History of psychiatric disorders biopola  
r disorder 2021  
  
  
  
                                                    Last Documented On   
1 4:06PM ; [facility]  
Work Phone: [phone]- Progress note*   
  
Progress note  
  
  
  
                                Date            Encounter       Last Documented   
by  
   
                                2024      Medical Established Patient Last  
 documented on 2024; 7:50 PM,   
Doreen Cabrera CNP; [facility]  
  
  
  
                                                      
  
  
** Active Problems & Conditions **  
- F90.9 - Attention-deficit Hyperactivity Disorder  
- F31.31 - Bipolar I Disorder, Most Recent Episode, Depressed Mild  
- F43.10 - Post-traumatic Stress Disorder  
  
** Chief Complaint **  
The Chief Complaint is: Patient was D/C'd from [facility] in 
[place]   
for no call no show. Patient needs all meds refilled. Ran out 3 days ago.  
  
** Referred Here **  
No prior encounters.  
  
** History of Present Illness **  
Linnette Beckham is a 23 year old female.  
- Allergy list reviewed - Reviewed Medications - Medication list reviewed  
- Date of last menstruation 2024 - Pregnancy test - would not like a 
pregnancy   
test  
Presents to see if we can take over psych meds , got dismissed from [facility] r/t no 
shows.   
has felt stable on meds x 11 months  
  
** Current Medication **  
- ARIPiprazole 5 MG Oral Tablet once daily, 90 days, 0 refills  
- busPIRone HCl 5 MG Oral Tablet one tablet twice daily, 90 days, 0 refills  
- lamoTRIgine 100 MG Oral Tablet once daily, 30 days, 0 refills  
- metFORMIN HCl 500 MG Oral Tablet AM and PM per GYN/[facility] in [place], 30 days, 0
   
refills  
- MiraLax 17 GM/SCOOP Oral Powder mix 1 scoop with 8 ounces once daily, 30 days,
 2   
refills  
- Narcan 4 MG/0.1ML Nasal Liquid spray in nostril for symptoms of overdose , may
   
repeat in 2 minutes if symptoms persist, 1 days, 0 refills  
- Prazosin HCl 1 MG Oral Capsule twice daily, 90 days, 0 refills  
- Sertraline HCl 50 MG Oral Tablet Once daily, 30 days, 0 refills  
- traZODone HCl 100 MG Oral Tablet twice daily, 30 days, 0 refills  
  
** Past Medical/Surgical History **  
Reported:  
Has sex without a condom.  
Medical: No previous hospitalizations. Chronic illness and Sexually transmitted   
infection Partners sexually transmitted infection status known.  
Immunization History: Recent immunization for flu.  
Exposure: Exposure to COVID-19.  
Pregnancy: Previously pregnant 1 time(s) and para having 0 live birth(s). Not   
planning a pregnancy in the next year.  
Diagnoses:  
Polycystic Ovarian Syndrome (PCOS).  
Migraine headache.  
Psychiatric disorders biopolar disorder  
Anxiety disorder NOS  
Procedural:  
- Insertion of ear pressure equalization tubes in both ears  
Surgical:  
- Tonsillectomy  
- Tonsillectomy with adenoidectomy  
  
** Social History **  
Environmental Exposure: No secondhand cigarette smoke exposure.  
Behavioral: Not a current tobacco user.  
Tobacco use: Using electronic cigarettes/vaping.  
Alcohol: Not using alcohol.  
Drug Use: Not using drugs denied by patient.  
Sexual: Sexually active age of sexual partner is _____ years old 21, sexual   
orientation Lesbian or David, gender identity Other, and birth control is being   
practiced Not Using - In Same Sex Relationship.  
  
** Allergies **  
- Abilify Reaction: groggy  
- Augmentin Reaction: Shock  
- metformin Reaction: Nausea, Vomiting  
- trazodone Reaction: Panic attack  
- Zoloft Reaction: slow/ groggy  
  
** Family History **  
Paternal:  
Systemic hypertension  
Oncologic disorder  
Maternal:  
Systemic hypertension  
Epilepsy and recurrent seizures  
Psychiatric disorders  
Oncologic disorder  
Fraternal:  
Psychiatric disorders  
  
** Review Of Systems **  
Head: No head symptoms.  
Neck: No neck symptoms.  
Eyes: No eye symptoms.  
Otolaryngeal: No ear symptoms, no nasal symptoms, no nose and sinus finding, no   
throat symptoms, no oral cavity symptoms, and no jaw symptoms.  
Breasts: No breast symptoms.  
Cardiovascular: No cardiovascular symptoms and no chest pain or discomfort.  
Pulmonary: No pulmonary symptoms.  
Gastrointestinal: No gastrointestinal symptoms.  
Genitourinary: No genitourinary symptoms.  
Musculoskeletal: No musculoskeletal symptoms.  
Neurological: No neurological symptoms.  
Psychological: Easily distracted.  
Skin: No skin symptoms.  
  
** Physical Findings **  
- Vitals taken 2024 07:09 pm  
BP-Sitting R134/88 mmHg  
BP Cuff SizeLarge  
Pulse Rate-Izpaglw419 bpm  
Hdyhuz25 in  
Wjwnez874 lbs  
Body Mass Index52.7 kg/m2  
Body Surface Area2.3 m2  
Oxygen Ycgwkcmsab12 %  
  
Vital Signs:  
- Systolic blood pressure 130 - 139 mmHg.  
- Diastolic blood pressure 80-89 mmHg.  
General Appearance:  
- Awake. - Alert. - Well developed. - Well nourished. - In no acute distress.  
Lungs:  
- Respiration rhythm and depth was normal. - Clear to auscultation.  
Cardiovascular:  
Heart Rate And Rhythm: - Normal.  
Heart Sounds: - Normal.  
Murmurs: - No murmurs were heard.  
Abdomen:  
Visual Inspection: - Abdomen was normal on visual inspection.  
Musculoskeletal System:  
General/bilateral: - Normal movement of all extremities.  
Skin:  
- General appearance was normal.  
  
** Tests **  
Blood Analysis:  
Hemoglobin Studies: ValueDate  
Blood hemoglobin A1c 5.5%3/27/2024  
Hemoglobin A1c level < 7.0%.  
Blood Endocrine Laboratory Tests: Value  
Blood glucose level by fingerstick Non-fasting 96 mg/dl  
Laboratory-based Chemistry:  
Other Laboratory Tests:  
Screening for sexually transmitted infections was performed.  
  
** Assessment **  
- Z11.3 - Encounter for screening for infections with a predominantly sexual 
mode of   
transmission  
- Z68.43 - Body mass index [BMI] 50.0-59.9, adult  
- Z13.1 - Encounter for screening for diabetes mellitus  
- F90.9 - Attention-deficit hyperactivity disorder, unspecified type  
  
** Vaccinations **  
- 1st Dose PFIZER COVID-19 Vaccine Dose #1 Status: Prev Hist Date: 2021  
- 1st Dose PFIZER COVID-19 Vaccine Dose #2 Status: Prev Hist Date: 2021  
- DTP Dose #1 Status: Prev Hist Date: 1999  
- Hep B, adolescent or pediatric (Engerix-B) Dose #1 Status: Prev Hist Date:   
1999  
- Hep B, adolescent or pediatric (Engerix-B) Dose #2 Status: Prev Hist Date:   
1999  
- Hib-Hep B (Comvax) Dose #1 Status: Prev Hist Date: 2000  
- IPV (IPOL) Dose #1 Status: Prev Hist Date: 1999  
- IPV (IPOL) Dose #2 Status: Prev Hist Date: 2000  
- IPV (IPOL) Dose #3 Status: Prev Hist Date: 2004  
- MMR (MMR-II) Dose #1 Status: Prev Hist Date: 2000  
- MMR (MMR-II) Dose #2 Status: Prev Hist Date: 2004  
- Meningococcal MCV4O Dose #1 Status: Prev Hist Date: 2016  
- OPV Dose #1 Status: Prev Hist Date: 1999  
- Tdap (Boostrix) Dose #1 Status: Prev Hist Date: 2010  
  
- Received dose of Reported: Patient has received the COVID Vaccine  
  
** Counseling/Education **  
- Wishing to stop using electronic cigarettes/vaping  
- Discussed nutritional needs teach healthy choices including fruits and 
vegetables  
- Patient education about a proper diet  
- Not requesting contraception  
- Discussed concerns about exercise: promote physical activity  
  
** Plan **  
StartCited- Attention-deficit hyperactivity disorder, unspecified type  
Intuniv 1 MG tablet take 1 tablet by mouth once daily at bedtime, 30 days, 5 
refills  
EndCited  
StartCited- Encntr screen for infections w sexl mode of transmiss  
Outside Labs/Microbiology: All 3 Urine Test Gonorrhea-Chlamydia-Trichomonas  
EndCited  
StartCited- Other  
Follow-up Appointment  
2 month med check  
ARIPiprazole 5 MG tablet take 1 tablet by mouth once daily, 30 days, 5 refills  
busPIRone HCl 5 MG tablet take 1 tablet by mouth twice daily, 30 days, 5 refills  
lamoTRIgine 100 MG tablet take 1 tablet by mouth once daily, 30 days, 5 refills  
Prazosin HCl 1 MG capsule take 1 capsule by mouth twice daily, 30 days, 5 
refills  
Sertraline HCl 50 MG tablet take 1 tablet by mouth once daily, 30 days, 5 
refills  
traZODone HCl 100 MG tablet take 1 tablet by mouth twice daily, 30 days, 5 
refills  
EndCited  
** Care Team **  
- Doreen Cabrera CNP  
  
** Health Reminders **  
- Assess BMI satisfied 2024.  
- Assess Tobacco Use satisfied 2024.  
- Diabetes Risk Screening Needed satisfied 2024.  
- Follow Up Plan BMI Management satisfied 2024.  
- Smoking & Tobacco Cessation Intervention and Counseling satisfied 2024.  
  
** User Defined 1 **  
Not planning a pregnancy in the next year.  
No (0 points) [Pre-DM]: No, patient has not been diagnosed with high blood 
pressure,   
No (0 points) [Pre-DM]: Patient has not been diagnosed with gestational diabetes
or   
given birth to a baby weighing 9 pounds or more, No (0 points) [Pre-DM]: No, 
mother,   
father, sister, or brother does not have DM, No (1 point) [Pre-DM]: No, not   
physically active, and Woman (0 Points) [Pre-DM].  
Less than 40 years (0 points) [Pre-DM].  
  
  
[facility]03- Progress note*   
  
Progress note  
  
  
  
                                Date            Encounter       Last Documented   
by  
   
                                2024       Established Patient Last docu  
mented on 2024; 5:16 PM,   
Leonie HUTCHINSON; [facility]  
  
  
  
                                                      
  
  
** Active Problems & Conditions **  
- F90.9 - Attention-deficit Hyperactivity Disorder  
- F31.31 - Bipolar I Disorder, Most Recent Episode, Depressed Mild  
- F43.10 - Post-traumatic Stress Disorder  
  
** Chief Complaint **  
The Chief Complaint is: [facility] met with patient to follow-up regarding mood and   
medications and discuss PHQ score of 2. Patient reports recently getting 
dismissed   
from services at a [facility] in [place] due to missing 
appointments.   
Patient reports being on a good medication regimen and feels that moods have 
been   
stable, sober from drugs for 6-7 months, in a healthy relationship and engaging 
with   
family and friends. Patient reports being diagnosed with ADHD but not treated at
 this   
time and would like to be due to concentration and focus issues. Patient reports
 no   
thoughts of harm toward self or others.  
  
** History of Present Illness **  
Linnette Beckham is a 23 year old female.  
- No Irritability - Normal appetite  
- Anxiety - Energy level is good - Easily distracted - Racing thoughts - No   
depression - No sleep disturbances - No loss of interest in activities - Not 
feeling   
guilty - No social isolation - No impulsive behavior - No high involvement in   
pleasurable activities  
  
** Current Medication **  
- ARIPiprazole 5 MG Oral Tablet take 1 tablet by mouth once daily, 30 days, 5 
refills  
- ARIPiprazole 5 MG Oral Tablet once daily, 90 days, 0 refills  
- busPIRone HCl 5 MG Oral Tablet take 1 tablet by mouth twice daily, 30 days, 5   
refills  
- busPIRone HCl 5 MG Oral Tablet one tablet twice daily, 90 days, 0 refills  
- Intuniv 1 MG Oral Tablet Extended Release 24 Hour take 1 tablet by mouth once 
daily   
at bedtime, 30 days, 5 refills  
- lamoTRIgine 100 MG Oral Tablet take 1 tablet by mouth once daily, 30 days, 5   
refills  
- lamoTRIgine 100 MG Oral Tablet once daily, 30 days, 0 refills  
- metFORMIN HCl 500 MG Oral Tablet AM and PM per GYN/[facility] in [place], 30 days, 0
   
refills  
- MiraLax 17 GM/SCOOP Oral Powder mix 1 scoop with 8 ounces once daily, 30 days,
 2   
refills  
- Narcan 4 MG/0.1ML Nasal Liquid spray in nostril for symptoms of overdose , may
   
repeat in 2 minutes if symptoms persist, 1 days, 0 refills  
- Prazosin HCl 1 MG Oral Capsule take 1 capsule by mouth twice daily, 30 days, 5
   
refills  
- Prazosin HCl 1 MG Oral Capsule twice daily, 90 days, 0 refills  
- Sertraline HCl 50 MG Oral Tablet take 1 tablet by mouth once daily, 30 days, 5
   
refills  
- Sertraline HCl 50 MG Oral Tablet Once daily, 30 days, 0 refills  
- traZODone HCl 100 MG Oral Tablet take 1 tablet by mouth twice daily, 30 days, 
5   
refills  
- traZODone HCl 100 MG Oral Tablet twice daily, 30 days, 0 refills  
  
** Social History **  
Environmental Exposure: No secondhand cigarette smoke exposure.  
Personal: Recent emotional stress due to running out of medications recently.  
Behavioral: Not a current tobacco user.  
Tobacco use: Using electronic cigarettes/vaping.  
Alcohol: Not using alcohol.  
Drug Use: Recovering from drug addiction. Not using drugs.  
  
** Physical Findings **  
General Appearance:  
- Normal Appearance.  
Neurological:  
- Estimated intelligence was normal. - Oriented to time, place, and person. - No
   
hallucinations. - Judgement was not impaired.  
Speech: - Is Normal.  
Psychiatric:  
- Mood is Euthymic. - Attitude Open.  
Demonstrated Behavior: - Motor Activity Normal Activity. - Eye Contact 
Appropriate.  
Affect: - Congruent with the mood.  
Thought Content: - Insight was intact. - No delusions. - No suicidal ideation. -
 No   
Passive thoughts of Death. - No suicidal plans. - No suicidal intent. - No 
homicidal   
ideations. - No homicidal plans. - No homicidal intent.  
Past Medical:  
- No repetitive self injurious behavior.  
  
** Assessment **  
- Z13.89 - Encounter for screening for other disorder  
- F90.9 - Attention-deficit hyperactivity disorder, unspecified type  
- F31.31 - Bipolar disorder, current episode depressed, mild  
- F43.10 - Post-traumatic stress disorder, unspecified  
  
** Therapy **  
- Developmental/Behavioral Screening & Testing - PHQ9.  
- SBIRT Full Screen Neg.  
- Brief solution-focused therapy.  
-  Visit 30 Minutes.  
- Plan - PCP to continue current medications and start Intuniv 1 mg daily. 
Patient   
agreeable to plan and Collaborated with patient and provider:  
  
** Counseling/Education **  
P offered active and supportive listening and processed current stressors 
related   
to getting medications.  
P discussed coping skills and supports to implement in daily routine.  
P discussed progress patient has felt they have made recently and encouraged   
continued follow-up with providers to address health.  
  
** Plan **  
Patient to take medications as prescribed and contact the office with any 
questions   
or concerns.  
Patient to implement coping skills and positive supports as discussed.  
P to attempt to follow-up with patient via phone in 2 weeks and at next in 
person   
visit as scheduled.  
  
** Care Team **  
- Doreen Cabrera CNP  
  
** Health Reminders **  
- Assess Tobacco Use satisfied 2024.  
- ESTHELA-2 satisfied 2024.  
- PHQ9 / PHQA satisfied 2024.  
- SBIRT satisfied 2024.  
  
** User Defined 1 **  
Not afraid of someone you have a relationship with No.  
Has lack of transportation kept patient from medical appointments or from 
getting   
medications: No and lack of transportation has kept patient from beneficial   
non-medical activities: No.  
She has not had 4 or more drinks in a day within the past year. Do you feel 
stress -   
tense, restless, nervous, or anxious, or unable to sleep at night because your 
mind   
is troubled all the time - these days? A little bit. No misuse of prescription 
only   
drugs. Illicit drug use and Does patient feel physically and emotionally safe 
where   
he/she lives: Yes. Is patient is worried about losing housing: No. What is the   
highest grade or level of school you have completed or the highest degree you 
have   
received? More than high school. In the past year, patient or family members in   
household were unable to get needed clothing: No, unable to get needed child 
care:   
No, unable to get other needs No, unable to get needed phone: No, unable to get   
needed utilities: No, unable to get needed Medicine or Health Care: No, and In 
the   
past year, patient or family members in household were unable to get needed 
food: No.   
How often does patient see or talk to people that that he/she cares about and 
feels   
close to: 5 or more times a week.  
ESTHELA-2 score was two 3/27/2024, ESTHELA-7 score [ESTHELA-7] Feeling nervous, anxious or 
on   
edge? + 2 pt : More than half the days, [ESTHELA-7] Not being able to stop or 
control   
worrying? + 0 pt : Not at all, Patient Health Questionnaire 9-Item total score 
was   
two 3/27/2024 If you checked off problems, how difficult is it for you to do 
your   
work? + : Somewhat difficult, [PHQ-9-1] Little interest or pleasure in doing 
things?   
+ 0 pt : Not at all, [PHQ-9-2] Feeling down, depressed, or hopeless? + 0 pt : 
Not at   
all, [PHQ-9-3] Trouble falling or staying asleep or sleeping too much? + 0 pt : 
Not   
at all, [PHQ-9-4] Feeling tired or having little energy? + 0 pt : Not at all,   
[PHQ-9-5] Poor appetite or overeating? + 0 pt : Not at all, [PHQ-9-6] Feeling 
bad   
about yourself-or that you are a failure + 0 pt : Not at all, [PHQ-9-7] Trouble   
concentrating on things such as reading the newspaper + 2 pt : More than half 
the   
days, [PHQ-9-8] Moving or speaking so slowly that other people have noticed. + 0
 pt :   
Not at all, and [PHQ-9-9] Thoughts that you would be better off dead or hurting   
yourself? + 0 pt : Not at all.  
  
  
[facility]07- Progress note*   
  
Progress note  
  
  
  
                                Date            Encounter       Last Documented   
by  
   
                                2023      Medical Established Patient Last  
 documented on 2023; 6:01 PM,   
Doreen Cabrera CNP; [facility]  
  
  
  
                                                      
  
  
** Active Problems & Conditions **  
- F31.10 - Bipolar I Disorder, Most Recent Episode, Manic  
- F60.3 - Borderline Personality Disorder  
- F43.10 - Post-traumatic Stress Disorder  
  
** Chief Complaint **  
The Chief Complaint is: Yearly follow up/would like referral to psychiatry (not 
Dr. Sullivan).  
  
** Referred Here **  
No prior encounters.  
  
** History of Present Illness **  
Linnette Beckham is a 23 year old female.  
- Allergy list reviewed - Reviewed Medications not taking any medications -   
Medication list reviewed  
Presents to get psych referral  anyone but dr sullivan  we had tried dr alonso in 
the   
past who was not accepting pts at that time. bh not in house and pt felt she 
didnt   
need to speak to the outside provider covering   I have a counselor   
  
Yearly follow up, would like a referral to psychiatry/not Dr. Sullivan, she does see
   
Haylie Almendarez a counselor at [facility]  
Currently only taking Metformin d/t PCOS  
  
** Current Medication **  
- Aldactazide 50-50 MG Oral Tablet take 1 tablet by mouth once daily, 30 days, 
11   
refills  
- metFORMIN HCl 500 MG Oral Tablet AM and PM per GYN/[facility] in [place], 30 days, 0
   
refills  
- MiraLax 17 GM/SCOOP Oral Powder mix 1 scoop with 8 ounces once daily, 30 days,
 2   
refills  
- Narcan 4 MG/0.1ML Nasal Liquid spray in nostril for symptoms of overdose , may
   
repeat in 2 minutes if symptoms persist, 1 days, 0 refills  
  
** Past Medical/Surgical History **  
Reported:  
Has sex without a condom.  
Medical: No previous hospitalizations. Chronic illness and Sexually transmitted   
infection Partners sexually transmitted infection status known.  
Exposure: Exposure to COVID-19.  
Pregnancy: Previously pregnant 0 time(s). Not planning to have a baby in the 
next 12   
months.  
Diagnoses:  
Polycystic Ovarian Syndrome (PCOS).  
Migraine headache.  
Psychiatric disorders biopolar disorder  
Anxiety disorder NOS  
Procedural:  
- Insertion of ear pressure equalization tubes in both ears  
Surgical:  
- Tonsillectomy  
- Tonsillectomy with adenoidectomy  
  
** Social History **  
Environmental Exposure: No secondhand cigarette smoke exposure.  
Behavioral: Not a current tobacco user.  
Tobacco use: Not using electronic cigarettes/vaping.  
Alcohol: Not using alcohol.  
Drug Use: Using marijuana.  
Sexual: Sexual orientation Other and gender identity Other.  
  
** Allergies **  
- Abilify Reaction: groggy  
- Augmentin Reaction: Shock  
- metformin Reaction: Nausea, Vomiting  
- trazodone Reaction: Panic attack  
- Zoloft Reaction: slow/ groggy  
  
** Family History **  
Paternal:  
Systemic hypertension  
Oncologic disorder  
Maternal:  
Systemic hypertension  
Epilepsy and recurrent seizures  
Psychiatric disorders  
Oncologic disorder  
Fraternal:  
Psychiatric disorders  
  
** Review Of Systems **  
Head: No head symptoms.  
Neck: No neck symptoms.  
Eyes: No eye symptoms.  
Otolaryngeal: No ear symptoms, no nasal symptoms, no nose and sinus finding, no   
throat symptoms, no oral cavity symptoms, and no jaw symptoms.  
Breasts: No breast symptoms.  
Cardiovascular: No cardiovascular symptoms and no chest pain or discomfort.  
Pulmonary: No pulmonary symptoms.  
Gastrointestinal: No gastrointestinal symptoms.  
Genitourinary: No genitourinary symptoms.  
Musculoskeletal: No musculoskeletal symptoms.  
Neurological: No neurological symptoms.  
Psychological: No psychological symptoms.  
Skin: No skin symptoms.  
Cardiovascular: Edema not present.  
  
** Physical Findings **  
- Vitals taken 2023 05:08 pm  
BP-Sitting L111/76 mmHg  
BP Cuff SizeLarge  
Pulse Rate-Gowtjtu91 bpm  
Temp-Oral98.2 F  
Izslrf11 in  
Hxgkje427 lbs  
Body Mass Index32.8 kg/m2  
Body Surface Area1.9 m2  
Oxygen Tilvwmkayw79 %  
  
Vital Signs:  
- Systolic blood pressure < 130 mmHg.  
- Diastolic Blood Pressure < 80 mmHg.  
General Appearance:  
- Awake. - Alert. - Well developed. - Well nourished. - In no acute distress.  
Lungs:  
- Respiration rhythm and depth was normal. - Clear to auscultation.  
Cardiovascular:  
Heart Rate And Rhythm: - Normal.  
Heart Sounds: - Normal.  
Murmurs: - No murmurs were heard.  
Thrill: - No thrill.  
Abdomen:  
Visual Inspection: - Abdomen was normal on visual inspection.  
Musculoskeletal System:  
General/bilateral: - Abnormal movement of all extremities.  
Skin:  
- General appearance was normal.  
  
** Tests **  
Blood Analysis:  
Hemoglobin Studies: ValueDate  
Blood hemoglobin A1c 5.3%2023  
Hemoglobin A1c level < 7.0%.  
Blood Endocrine Laboratory Tests: Value  
Blood glucose level by fingerstick Non-fasting 114 mg/dl  
  
** Assessment **  
- Z68.32 - Body mass index [BMI] 32.0-32.9, adult  
- F60.3 - Borderline personality disorder  
- Z13.1 - Encounter for screening for diabetes mellitus  
  
** Therapy **  
- Patient has agreed to receive flu vaccine today.  
  
** Vaccinations **  
- Received dose of Reported: Patient has received the COVID Vaccine  
  
** Counseling/Education **  
- Discussed nutritional needs teach healthy choices including fruits and 
vegetables  
- Patient education about a proper diet  
- Discussed concerns about exercise: promote physical activity  
  
** Plan **  
StartCited- Borderline personality disorder  
Referrals: Psychiatry  
Instructions: Please make a referral to: see if dr alonso accepting now, 
otherwise ok   
with arminda or anjelica  
EndCited  
StartCited- Essential (primary) hypertension  
Aldactazide 50-50 MG tablet take 1 tablet by mouth once daily, 30 days, 11 
refills  
EndCited  
StartCited- Other  
Follow-up Appointment  
f/u phone call 1 month  
EndCited  
** Health Reminders **  
- Assess BMI satisfied 2023.  
- Assess Tobacco Use satisfied 2023.  
- Follow Up Plan BMI Management satisfied 2023.  
  
  
[facility]- Evaluation note  
  
Includes: Assessments for all patient encounters  
  
  
  
                                Findings        Encounter       Date  
   
                                        Assessment of body mass index Medical Es  
tablished Patient with   
Doreen Deborah [facility]                        10/21/2022  
   
                                                    Bipolar affective disorder,   
current   
episode manic                            Telebehavioral Health with Haylie   
Aguilar [facility]                        2022  
   
                                                    Bipolar affective disorder,   
current   
episode manic                            Established Patient with Haylie Martinerson [facility]                        2022  
   
                                                    Bipolar affective disorder,   
current   
episode depressed, severe with   
psychosis                                Telebehavioral Health with Haylie Aguilar [facility]                        2022  
   
                                                    Assessment of body mass inde  
x [Body   
mass index [BMI] 50.0-59.9, adult]      [facility] - Established with   
Julieth Pisano CNP                        2022  
   
                                                    Bipolar I disorder, most rec  
ent   
episode, manic                          Open Access - Established with   
Julieth Pisano CNP                        2022  
   
                                        Post-traumatic stress disorder  Establ  
ished Patient with Haylienicanor Martinerson [facility]                        2022  
   
                                        No cough            Medical Established   
Patient with   
Doreen Mccormacken [facility]                        2022  
   
                                                    Z68.43 - Body mass index [BM  
I]   
50.0-59.9, adult                        Medical Established Patient with   
Doreen Deborah [facility]                        2022  
   
                                                    Borderline personality disor  
danny Pt   
reported hx of sx/dx                     Established Patient with Haylie [facility]                        2022  
   
                                                    Assessment of body mass inde  
x [Body   
mass index [BMI] 50.0-59.9, adult]      [facility] - Established with   
Julieth Aliya CNP                        2022  
   
                                                    Diabetes Risk Test Score was  
 three   
score 2022                         Open Access - Established with   
Julieth Aliya CNP                        2022  
   
                                                    Bipolar I disorder, most rec  
ent   
episode, manic                           Established Patient with   
Avis Felder [facility]                2021  
   
                                        Borderline personality disorder  Estab  
lished Patient with   
Avis Felder [facility]                2021  
   
                                        Post-traumatic stress disorder  Establ  
ished Patient with   
Avis Felder [facility]                2021  
   
                                                    Assessment of visit for: bhargavi myles for   
human immunodeficiency virus            Medical Established Patient with   
Doreen Deborah CNP                        2021  
   
                                        Nicotine dependence Medical Established   
Patient with   
Doreen Deborah CNP                        2021  
   
                                        Tachycardia         Medical Established   
Patient with   
Doreen Deborah [facility]                        2021  
   
                                                    Z68.43 - Body mass index [BM  
I]   
50.0-59.9, adult                        Medical Established Patient with   
Doreen Deborah CNP                        2021  
   
                                                    Bipolar I disorder, most rec  
ent   
episode, manic                           Established Patient with Leonie   
[facility]                             2021  
   
                                                    Borderline personality disor  
danny per   
patient reported history                 Established Patient with Leonie   
[facility]                             2021  
   
                                        Nicotine dependence  Established Patie  
nt with Leonie   
[facility]                             2021  
   
                                        Post-traumatic stress disorder  Establ  
ished Patient with Leonie   
[facility]                             2021  
   
                                        Body mass index     Medical Established   
Patient with   
Doreen Deborah CNP                        2021  
   
                                        Morbid obesity      Medical Established   
Patient with   
Doreen Deborah CNP                        2021  
   
                                        Nicotine dependence uncomplicated Medica  
l Established Patient with   
Doreen Deborah [facility]                        2021  
   
                                                    Z68.42 - Body mass index [BM  
I]   
45.0-49.9, adult                        Medical Established Patient with   
Doreen Deborah CNP                        2021  
   
                                Episodic mood disorders On Call with Pamela edwards CNP 2021  
   
                                                    Mood disorders, NOS per tabatha  
ent   
reported history                         Established Patient with Leonie   
[facility]                             2021  
   
                                        Post-traumatic stress disorder BH Establ  
ished Patient with Leonie   
[facility]                             2021  
   
                                        Morbid obesity      Medical Established   
Patient with   
Doreen Deborah CNP                        2021  
   
                                        Otitis externa      Medical Established   
Patient with   
Doreen Deborah CNP                        2021  
   
                                                    Z68.42 - Body mass index [BM  
I]   
45.0-49.9, adult                        Medical Established Patient with   
Doreen Deborah CNP                        2021  
   
                                        Post-traumatic stress disorder BH Establ  
ished Patient with Leonie   
[facility]                             06/10/2021  
   
                                        Morbid obesity      Medical Established   
Patient with   
Doreen Deborah CNP                        06/10/2021  
   
                                                    Z68.42 - Body mass index [BM  
I]   
45.0-49.9, adult                        Medical Established Patient with   
Doreen Deborah CNP                        06/10/2021  
   
                                        Post-traumatic stress disorder BH Establ  
ished Patient with Leonie   
[facility]                             2021  
   
                                                    Assessment of visit for: bhargavi myles for   
human immunodeficiency virus            Medical New Patient with Doreen   
Deborah [facility]                              2021  
   
                                                    Diabetes Risk Test Score was  
 one score   
2021                               Medical New Patient with Doreen   
Deborah CNP                              2021  
   
                                        Hypertension        Medical New Patient   
with Doreen   
Deborah [facility]                              2021  
   
                                        Morbid obesity      Medical New Patient   
with Doreen   
Deborah [facility]                              2021  
   
                                        Post-traumatic stress disorder Medical N  
ew Patient with Doreen   
Deborah CNP                              2021  
   
                                                    Z68.42 - Body mass index [BM  
I]   
45.0-49.9, adult                        Medical New Patient with Doreen   
Deborah [facility]                              2021  
  
[facility]  
Work Phone: [phone]- Evaluation note  
  
Includes: Assessments for all patient encounters  
  
  
  
                                Findings        Encounter       Date  
   
                                                    Bipolar affective disorder,   
current   
episode manic                            Telebehavioral Health with Haylie Aguilar [facility]                        2022  
   
                                                    Bipolar affective disorder,   
current   
episode manic                            Established Patient with Haylienicanor Aguilar [facility]                        2022  
   
                                                    Bipolar affective disorder,   
current   
episode depressed, severe with   
psychosis                                Telebehavioral Health with Haylie Aguilar [facility]                        2022  
   
                                                    Assessment of body mass inde  
x [Body   
mass index [BMI] 50.0-59.9, adult]      Open Access - Established with   
Julieth Pisano CNP                        2022  
   
                                                    Bipolar I disorder, most rec  
ent   
episode, manic                          Open Access - Established with   
Julieth Aliya CNP                        2022  
   
                                        Post-traumatic stress disorder  Establ  
ished Patient with Haylienicanor [facility]                        2022  
   
                                        No cough            Medical Established   
Patient with   
Doreenpaola Cabrera CNP                        2022  
   
                                                    Z68.43 - Body mass index [BM  
I]   
50.0-59.9, adult                        Medical Established Patient with   
Doreen Deborah CNP                        2022  
   
                                                    Borderline personality disor  
danny Pt   
reported hx of sx/dx                     Established Patient with Haylie [facility]                        2022  
   
                                                    Assessment of body mass inde  
x [Body   
mass index [BMI] 50.0-59.9, adult]      [facility] Access - Established with   
Julieth Aliya CNP                        2022  
   
                                                    Diabetes Risk Test Score was  
 three   
score 2022                         Open Access - Established with   
Julieth Aliya CNP                        2022  
   
                                                    Bipolar I disorder, most rec  
ent   
episode, manic                           Established Patient with   
Avis Felder [facility]                2021  
   
                                        Borderline personality disorder  Estab  
lished Patient with   
Avis Felder [facility]                2021  
   
                                        Post-traumatic stress disorder  Establ  
ished Patient with   
Avis Felder [facility]                2021  
   
                                                    Assessment of visit for: bhargavi myles for   
human immunodeficiency virus            Medical Established Patient with   
Doreen Deborah CNP                        2021  
   
                                        Nicotine dependence Medical Established   
Patient with   
Doreen Deborah CNP                        2021  
   
                                        Tachycardia         Medical Established   
Patient with   
Doreen Deborah [facility]                        2021  
   
                                                    Z68.43 - Body mass index [BM  
I]   
50.0-59.9, adult                        Medical Established Patient with   
Doreen Deborah CNP                        2021  
   
                                                    Bipolar I disorder, most rec  
ent   
episode, manic                           Established Patient with Leonie   
[facility]                             2021  
   
                                                    Borderline personality disor  
danny per   
patient reported history                BH Established Patient with Leonie   
[facility]                             2021  
   
                                        Nicotine dependence  Established Patie  
nt with Leonie   
[facility]                             2021  
   
                                        Post-traumatic stress disorder  Establ  
ished Patient with Leonie   
[facility]                             2021  
   
                                        Body mass index     Medical Established   
Patient with   
Dorene Deborah CNP                        2021  
   
                                        Morbid obesity      Medical Established   
Patient with   
Doreen Deborah CNP                        2021  
   
                                        Nicotine dependence uncomplicated Medica  
l Established Patient with   
Doreen Deborah CNP                        2021  
   
                                                    Z68.42 - Body mass index [BM  
I]   
45.0-49.9, adult                        Medical Established Patient with   
Doreen Deborah CNP                        2021  
   
                                Episodic mood disorders On Call with Pamela edwards CNP 2021  
   
                                                    Mood disorders, NOS per tabatha  
ent   
reported history                        BH Established Patient with Leonie   
[facility]                             2021  
   
                                        Post-traumatic stress disorder BH Establ  
ished Patient with Leonie   
[facility]                             2021  
   
                                        Morbid obesity      Medical Established   
Patient with   
Doreen Deborah CNP                        2021  
   
                                        Otitis externa      Medical Established   
Patient with   
Doreen Deborah CNP                        2021  
   
                                                    Z68.42 - Body mass index [BM  
I]   
45.0-49.9, adult                        Medical Established Patient with   
Doreen Deborah CNP                        2021  
   
                                        Post-traumatic stress disorder BH Establ  
ished Patient with Leonie   
[facility]                             06/10/2021  
   
                                        Morbid obesity      Medical Established   
Patient with   
Doreen Deborah CNP                        06/10/2021  
   
                                                    Z68.42 - Body mass index [BM  
I]   
45.0-49.9, adult                        Medical Established Patient with   
Doreen Deborah CNP                        06/10/2021  
   
                                        Post-traumatic stress disorder BH Establ  
ished Patient with Leonie   
[facility]                             2021  
   
                                                    Assessment of visit for: bhargavi myles for   
human immunodeficiency virus            Medical New Patient with Doreen   
Deborah [facility]                              2021  
   
                                                    Diabetes Risk Test Score was  
 one score   
2021                               Medical New Patient with Doreen   
Deborah CNP                              2021  
   
                                        Hypertension        Medical New Patient   
with Doreen   
Deborah CNP                              2021  
   
                                        Morbid obesity      Medical New Patient   
with Doreen   
Deborah CNP                              2021  
   
                                        Post-traumatic stress disorder Medical N  
ew Patient with Doreen   
Deborah CNP                              2021  
   
                                                    Z68.42 - Body mass index [BM  
I]   
45.0-49.9, adult                        Medical New Patient with Doreen   
Deborah CNP                              2021  
  
[facility]  
Work Phone: [phone]- Evaluation note  
  
Includes: Assessments for all patient encounters  
  
  
  
                                Findings        Encounter       Date  
   
                                                    Bipolar affective disorder,   
current   
episode depressed, severe with   
psychosis                                Telebehavioral Health with Haylienicanor [facility]                        2022  
   
                                                    Assessment of body mass inde  
x [Body   
mass index [BMI] 50.0-59.9, adult]      Open Access - Established with   
Julieth Aliya CNP                        2022  
   
                                        Post-traumatic stress disorder  Establ  
ished Patient with Haylie   
Aguilar LP[facility]-S                        2022  
   
                                        No cough            Medical Established   
Patient with   
Doreenpaola Mccormacken CNP                        2022  
   
                                                    Z68.43 - Body mass index [BM  
I]   
50.0-59.9, adult                        Medical Established Patient with   
Doreen Deborah CNP                        2022  
   
                                                    Borderline personality disor  
danny Pt   
reported hx of sx/dx                     Established Patient with Haylienicanor Aguilar LPCC-S                        2022  
   
                                                    Assessment of body mass inde  
x [Body   
mass index [BMI] 50.0-59.9, adult]      Open Access - Established with   
Julieth Aliya CNP                        2022  
   
                                                    Diabetes Risk Test Score was  
 three   
score 2022                         Open Access - Established with   
Julieth Aliya CNP                        2022  
   
                                                    Bipolar I disorder, most rec  
ent   
episode, manic                           Established Patient with   
Avis Felder LPCC-S                2021  
   
                                        Borderline personality disorder  Estab  
lished Patient with   
Avis Felder LPCC-S                2021  
   
                                        Post-traumatic stress disorder  Establ  
ished Patient with   
Avis Felder LPCC-S                2021  
   
                                                    Assessment of visit for: bhargavi myles for   
human immunodeficiency virus            Medical Established Patient with   
Doreen Deborah CNP                        2021  
   
                                        Nicotine dependence Medical Established   
Patient with   
Doreen Deborah CNP                        2021  
   
                                        Tachycardia         Medical Established   
Patient with   
Doreen Deborah CNP                        2021  
   
                                                    Z68.43 - Body mass index [BM  
I]   
50.0-59.9, adult                        Medical Established Patient with   
Doreen Deborah CNP                        2021  
   
                                                    Bipolar I disorder, most rec  
ent   
episode, manic                           Established Patient with Leonie   
Short [facility]                             2021  
   
                                                    Borderline personality disor  
danny per   
patient reported history                 Established Patient with Leonie   
Short [facility]                             2021  
   
                                        Nicotine dependence  Established Patie  
nt with Leonie   
Short L[facility]                             2021  
   
                                        Post-traumatic stress disorder BH Establ  
ished Patient with Leonie   
Short [facility]                             2021  
   
                                        Body mass index     Medical Established   
Patient with   
Doreen Deborah CNP                        2021  
   
                                        Morbid obesity      Medical Established   
Patient with   
Doreen Deborah CNP                        2021  
   
                                        Nicotine dependence uncomplicated Medica  
l Established Patient with   
Doreen Deborah CNP                        2021  
   
                                                    Z68.42 - Body mass index [BM  
I]   
45.0-49.9, adult                        Medical Established Patient with   
Doreen Deborah CNP                        2021  
   
                                Episodic mood disorders On Call with Pamela edwards CNP 2021  
   
                                                    Mood disorders, NOS per tabatha  
ent   
reported history                        BH Established Patient with Leonie   
Short [facility]                             2021  
   
                                        Post-traumatic stress disorder BH Establ  
ished Patient with Leonie   
Short [facility]                             2021  
   
                                        Morbid obesity      Medical Established   
Patient with   
Doreen Deborah CNP                        2021  
   
                                        Otitis externa      Medical Established   
Patient with   
Doreen Deborah CNP                        2021  
   
                                                    Z68.42 - Body mass index [BM  
I]   
45.0-49.9, adult                        Medical Established Patient with   
Doreen Deborah CNP                        2021  
   
                                        Post-traumatic stress disorder BH Establ  
ished Patient with Leonie   
Short LISWS                             06/10/2021  
   
                                        Morbid obesity      Medical Established   
Patient with   
Doreen Deborah CNP                        06/10/2021  
   
                                                    Z68.42 - Body mass index [BM  
I]   
45.0-49.9, adult                        Medical Established Patient with   
Doreen Deborah CNP                        06/10/2021  
   
                                        Post-traumatic stress disorder BH Establ  
ished Patient with Leonie   
Short [facility]                             2021  
   
                                                    Assessment of visit for: bhargavi myles for   
human immunodeficiency virus            Medical New Patient with Doreen   
Deborah CNP                              2021  
   
                                                    Diabetes Risk Test Score was  
 one score   
2021                               Medical New Patient with Doreen   
Deborah CNP                              2021  
   
                                        Hypertension        Medical New Patient   
with Doreen   
Deborah CNP                              2021  
   
                                        Morbid obesity      Medical New Patient   
with Doreen   
Deborah CNP                              2021  
   
                                        Post-traumatic stress disorder Medical N  
ew Patient with Doreen   
Deborah CNP                              2021  
   
                                                    Z68.42 - Body mass index [BM  
I]   
45.0-49.9, adult                        Medical New Patient with Doreen   
Deborah CNP                              2021  
  
[facility]  
Work Phone: [phone]- Evaluation note  
  
Includes: Assessments for all patient encounters  
  
  
  
                                Findings        Encounter       Date  
   
                                                    Bipolar affective disorder,   
current   
episode depressed, severe with   
psychosis                                [facility]  
   
                                                    Assessment of body mass inde  
x [Body   
mass index [BMI] 50.0-59.9, adult]      Open Access - Established with   
Julieth Aliya CNP                        2022  
   
                                                    Bipolar I disorder, most rec  
ent   
episode, manic                          Open Access - Established with   
Julieth Aliya CNP                        2022  
   
                                        Post-traumatic stress disorder  Establ  
ished Patient [facility]  
   
                                        No cough            Medical Established   
Patient with   
Doreen Deborah [facility]                        2022  
   
                                                    Z68.43 - Body mass index [BM  
I]   
50.0-59.9, adult                        Medical Established Patient with   
Doreenpaola Cabrera [facility]                        2022  
   
                                                    Borderline personality disor  
danny Pt   
reported hx of sx/dx                     Establ[facility]ican[facility]  
   
                                                    Assessment of body mass inde  
x [Body   
mass index [BMI] 50.0-59.9, adult]      Open Access - Established with   
Julieth Aliya CNP                        2022  
   
                                                    Diabetes Risk Test Score was  
 three   
score 2022                         Open Access - Established with   
Julieth Aliya CNP                        2022  
   
                                                    Bipolar I disorder, most rec  
ent   
episode, manic                           Established Patient with   
Avis Felder LPCC-S                2021  
   
                                        Borderline personality disorder  Estab  
lished Patient with   
Avis Felder LPCC-S                2021  
   
                                        Post-traumatic stress disorder  Establ  
ished Patient with   
Avis Felder LP[facility]-S                2021  
   
                                                    Assessment of visit for: bhargavi myles for   
human immunodeficiency virus            Medical Established Patient with   
Doreenpaola Cabrera [facility]                        2021  
   
                                        Nicotine dependence Medical Established   
Patient with   
Doreenpaola Cabrera [facility]                        2021  
   
                                        Tachycardia         Medical Established   
Patient with   
Doreenpaola Cabrera [facility]                        2021  
   
                                                    Z68.43 - Body mass index [BM  
I]   
50.0-59.9, adult                        Medical Established Patient with   
Doreenpaola Cabrera [facility]                        2021  
   
                                                    Bipolar I disorder, most rec  
ent   
episode, manic                           Established Patient with Leonie   
Short [facility]                             2021  
   
                                                    Borderline personality disor  
danny per   
patient reported history                BH Established Patient with Leonie   
Short [facility]                             2021  
   
                                        Nicotine dependence BH Established Patie  
nt with Leonie   
Short LISWS                             2021  
   
                                        Post-traumatic stress disorder BH Establ  
ished Patient with Leonie   
Short [facility]                             2021  
   
                                        Body mass index     Medical Established   
Patient with   
Doreen Deborah CNP                        2021  
   
                                        Morbid obesity      Medical Established   
Patient with   
Doreen Deborah CNP                        2021  
   
                                        Nicotine dependence uncomplicated Medica  
l Established Patient with   
Doreen Deborah CNP                        2021  
   
                                                    Z68.42 - Body mass index [BM  
I]   
45.0-49.9, adult                        Medical Established Patient with   
Doreen Deborah CNP                        2021  
   
                                Episodic mood disorders On Call with Pamela edwards CNP 2021  
   
                                                    Mood disorders, NOS per tabatha  
ent   
reported history                         Established Patient with Leonie   
Short [facility]                             2021  
   
                                        Post-traumatic stress disorder  Establ  
ished Patient with Leonie   
Short [facility]                             2021  
   
                                        Morbid obesity      Medical Established   
Patient with   
Doreen Deborah CNP                        2021  
   
                                        Otitis externa      Medical Established   
Patient with   
Doreen Deborah CNP                        2021  
   
                                                    Z68.42 - Body mass index [BM  
I]   
45.0-49.9, adult                        Medical Established Patient with   
Doreen Deborah CNP                        2021  
   
                                        Post-traumatic stress disorder BH Establ  
ished Patient with Leonie   
Short LISWS                             06/10/2021  
   
                                        Morbid obesity      Medical Established   
Patient with   
Doreen Deborah CNP                        06/10/2021  
   
                                                    Z68.42 - Body mass index [BM  
I]   
45.0-49.9, adult                        Medical Established Patient with   
Doreen Deborah CNP                        06/10/2021  
   
                                        Post-traumatic stress disorder BH Establ  
ished Patient with Leonie   
Short L[facility]                             2021  
   
                                                    Assessment of visit for: bhargavi myles for   
human immunodeficiency virus            Medical New Patient with Doreen   
Deborah CNP                              2021  
   
                                                    Diabetes Risk Test Score was  
 one score   
2021                               Medical New Patient with Doreen   
Dbeorah CNP                              2021  
   
                                        Hypertension        Medical New Patient   
with Doreen   
Deborah CNP                              2021  
   
                                        Morbid obesity      Medical New Patient   
with Doreen   
Deborah CNP                              2021  
   
                                        Post-traumatic stress disorder Medical N  
ew Patient with Doreenpaola Cabrera [facility]                              2021  
   
                                                    Z68.42 - Body mass index [BM  
I]   
45.0-49.9, adult                        Medical New Patient with Doreen Cabrera CNP                              2021  
  
[facility]  
Work Phone: [phone]- Evaluation note  
  
Includes: Assessments for all patient encounters  
  
  
  
                                Findings        Encounter       Date  
   
                                                    Bipolar affective disorder,   
current   
episode manic                            Established Patient with Haylie   
Aguilar [facility]S                        2022  
   
                                                    Bipolar affective disorder,   
current   
episode depressed, severe with   
psychosis                                Telebehavioral Health with Haylienicanor Aguilar LPCC-S                        2022  
   
                                                    Assessment of body mass inde  
x [Body   
mass index [BMI] 50.0-59.9, adult]      Open Access - Established with   
Julieth Aliya CNP                        2022  
   
                                                    Bipolar I disorder, most rec  
ent   
episode, manic                          Open Access - Established with   
Julieth Aliya CNP                        2022  
   
                                        Post-traumatic stress disorder  Establ  
ished Patient with Haylienicanor Aguilar LPCC-S                        2022  
   
                                        No cough            Medical Established   
Patient with   
Doreenpaola Cabrera [facility]                        2022  
   
                                                    Z68.43 - Body mass index [BM  
I]   
50.0-59.9, adult                        Medical Established Patient with   
Doreenpaola Cabrera [facility]                        2022  
   
                                                    Borderline personality disor  
danny Pt   
reported hx of sx/dx                     Established Patient with Haylienicanor Aguilar LPCC-S                        2022  
   
                                                    Assessment of body mass inde  
x [Body   
mass index [BMI] 50.0-59.9, adult]      Open Access - Established with   
Julieth Aliya CNP                        2022  
   
                                                    Diabetes Risk Test Score was  
 three   
score 2022                         Open Access - Established with   
Julieth Aliya CNP                        2022  
   
                                                    Bipolar I disorder, most rec  
ent   
episode, manic                           Established Patient with   
Avissharmila Mcgees LPCC-S                2021  
   
                                        Borderline personality disorder  Estab  
lished Patient with   
Avis Felder LPCC-S                2021  
   
                                        Post-traumatic stress disorder  Establ  
ished Patient with   
Avis Felder [facility]-S                2021  
   
                                                    Assessment of visit for: bhargavi myles for   
human immunodeficiency virus            Medical Established Patient with   
Doreen Deborah CNP                        2021  
   
                                        Nicotine dependence Medical Established   
Patient with   
Doreen Deborah CNP                        2021  
   
                                        Tachycardia         Medical Established   
Patient with   
Doreen Deborah CNP                        2021  
   
                                                    Z68.43 - Body mass index [BM  
I]   
50.0-59.9, adult                        Medical Established Patient with   
Doreen Deborah CNP                        2021  
   
                                                    Bipolar I disorder, most rec  
ent   
episode, manic                           Established Patient with Leonie   
Short LISWS                             2021  
   
                                                    Borderline personality disor  
danny per   
patient reported history                 Established Patient with Leonie   
Short LISWS                             2021  
   
                                        Nicotine dependence BH Established Patie  
nt with Leonie   
Short LISWS                             2021  
   
                                        Post-traumatic stress disorder  Establ  
ished Patient with Leonie   
Short LISWS                             2021  
   
                                        Body mass index     Medical Established   
Patient with   
Doreen Deborah CNP                        2021  
   
                                        Morbid obesity      Medical Established   
Patient with   
Doreen Deborah CNP                        2021  
   
                                        Nicotine dependence uncomplicated Medica  
l Established Patient with   
Doreen Deborah CNP                        2021  
   
                                                    Z68.42 - Body mass index [BM  
I]   
45.0-49.9, adult                        Medical Established Patient with   
Doreen Deborah CNP                        2021  
   
                                Episodic mood disorders On Call with Pamela edwards CNP 2021  
   
                                                    Mood disorders, NOS per tabatha  
ent   
reported history                         Established Patient with Leonie   
Short [facility]WS                             2021  
   
                                        Post-traumatic stress disorder  Establ  
ished Patient with Leonie   
Short LISWS                             2021  
   
                                        Morbid obesity      Medical Established   
Patient with   
Doreen Deborah CNP                        2021  
   
                                        Otitis externa      Medical Established   
Patient with   
Doreen Deborah CNP                        2021  
   
                                                    Z68.42 - Body mass index [BM  
I]   
45.0-49.9, adult                        Medical Established Patient with   
Doreen Deborah CNP                        2021  
   
                                        Post-traumatic stress disorder BH Establ  
ished Patient with Leonie   
Short LISWS                             06/10/2021  
   
                                        Morbid obesity      Medical Established   
Patient with   
Doreen Deborah CNP                        06/10/2021  
   
                                                    Z68.42 - Body mass index [BM  
I]   
45.0-49.9, adult                        Medical Established Patient with   
Doreen Deborah CNP                        06/10/2021  
   
                                        Post-traumatic stress disorder BH Establ  
ished Patient with Leonie   
Short LISWS                             2021  
   
                                                    Assessment of visit for: bhargavi myles for   
human immunodeficiency virus            Medical New Patient with Doreen   
Deborah CNP                              2021  
   
                                                    Diabetes Risk Test Score was  
 one score   
2021                               Medical New Patient with Doreen   
Deborah CNP                              2021  
   
                                        Hypertension        Medical New Patient   
with Doreen   
Deborah CNP                              2021  
   
                                        Morbid obesity      Medical New Patient   
with Doreen   
Deborah CNP                              2021  
   
                                        Post-traumatic stress disorder Medical N  
ew Patient with Doreen   
Deborah CNP                              2021  
   
                                                    Z68.42 - Body mass index [BM  
I]   
45.0-49.9, adult                        Medical New Patient with Doreen   
Deborah CNP                              2021  
  
[facility]  
Work Phone: [phone]- Evaluation note  
  
Includes: Assessments for all patient encounters  
  
  
  
                                Findings        Encounter       Date  
   
                                        Post-traumatic stress disorder BH Establ  
ished Patient [facility]  
   
                                        No cough            Medical Established   
Patient with   
Doreen Deborah CNP                        2022  
   
                                                    Z68.43 - Body mass index [BM  
I]   
50.0-59.9, adult                        Medical Established Patient with   
Doreen Deborah CNP                        2022  
   
                                                    Borderline personality disor  
danny Pt   
reported hx of sx/dx                     Established Patient with Haylie   
Aguilar LPCC-S                        2022  
   
                                                    Assessment of body mass inde  
x [Body mass   
index [BMI] 50.0-59.9, adult]           Open Access - Established with   
Julieth Pisano CNP                        2022  
   
                                                    Diabetes Risk Test Score was  
 three score   
2022                               Open Access - Established with   
Julieth Aliya CNP                        2022  
   
                                                    Bipolar I disorder, most rec  
ent episode,   
manic                                    Established Patient with   
Avissharmila Mcgees LPCC-S                2021  
   
                                        Borderline personality disorder BH Estab  
lished Patient with   
Avis Felder LPCC-S                2021  
   
                                        Post-traumatic stress disorder BH Establ  
ished Patient with   
Avis Felder LPCC-S                2021  
   
                                                    Assessment of visit for: bhargavi myles for   
human immunodeficiency virus            Medical Established Patient with   
Doreen Deborah CNP                        2021  
   
                                        Nicotine dependence Medical Established   
Patient with   
Doreen Deborah CNP                        2021  
   
                                        Tachycardia         Medical Established   
Patient with   
Doreen Deborah CNP                        2021  
   
                                                    Z68.43 - Body mass index [BM  
I]   
50.0-59.9, adult                        Medical Established Patient with   
Doreen Deborah CNP                        2021  
   
                                                    Bipolar I disorder, most rec  
ent episode,   
manic                                    Established Patient with   
Leonie Short LISWS                     2021  
   
                                                    Borderline personality disor  
danny per   
patient reported history                BH Established Patient with   
Leonie Short LISWS                     2021  
   
                                        Nicotine dependence BH Established Patie  
nt with   
Leonie Short LISWS                     2021  
   
                                        Post-traumatic stress disorder  Establ  
ished Patient with   
Leonie Short LISWS                     2021  
   
                                        Body mass index     Medical Established   
Patient with   
Doreen Deborah CNP                        2021  
   
                                        Morbid obesity      Medical Established   
Patient with   
Doreen Deborah CNP                        2021  
   
                                        Nicotine dependence uncomplicated Medica  
l Established Patient with   
Doreen Deborah CNP                        2021  
   
                                                    Z68.42 - Body mass index [BM  
I]   
45.0-49.9, adult                        Medical Established Patient with   
Doreen Deborah CNP                        2021  
   
                                Episodic mood disorders On Call with Paemla edwards CNP 2021  
   
                                                    Mood disorders, NOS per tabatha  
ent reported   
history                                  Established Patient with   
Leonie Short LISWS                     2021  
   
                                        Post-traumatic stress disorder  Establ  
ished Patient with   
Leonie Short LISWS                     2021  
   
                                        Morbid obesity      Medical Established   
Patient with   
Doreen Deborah CNP                        2021  
   
                                        Otitis externa      Medical Established   
Patient with   
Doreen Deborah CNP                        2021  
   
                                                    Z68.42 - Body mass index [BM  
I]   
45.0-49.9, adult                        Medical Established Patient with   
Doreen Deborah CNP                        2021  
   
                                        Post-traumatic stress disorder BH Establ  
ished Patient with   
Leonie Short LISWS                     06/10/2021  
   
                                        Morbid obesity      Medical Established   
Patient with   
Doreen Deborah CNP                        06/10/2021  
   
                                                    Z68.42 - Body mass index [BM  
I]   
45.0-49.9, adult                        Medical Established Patient with   
Doreen Deborah CNP                        06/10/2021  
   
                                        Post-traumatic stress disorder BH Establ  
ished Patient with   
Leonie Short LISWS                     2021  
   
                                                    Assessment of visit for: bhargavi myles for   
human immunodeficiency virus            Medical New Patient with Doreen   
Deborah CNP                              2021  
   
                                                    Diabetes Risk Test Score was  
 one score   
2021                               Medical New Patient with Doreen Cabrera LARISSA                              2021  
   
                                        Hypertension        Medical New Patient   
with Doreen Cbarera LARISSA                              2021  
   
                                        Morbid obesity      Medical New Patient   
with Doreen Cabrera [facility]                              2021  
   
                                        Post-traumatic stress disorder Medical N  
ew Patient with Doreen Cabrera CNP                              2021  
   
                                                    Z68.42 - Body mass index [BM  
I]   
45.0-49.9, adult                        Medical New Patient with Doreen Cabrera CNP                              2021  
  
[facility]  
Work Phone: [phone]- History general Narrative - Reported  
  
Includes: Medical History in patient's chart  
  
  
  
                                        Description         Last Updated  
   
                                        Has sex without a condom 2022  
   
                                        Not planning to have a baby in the next   
12 months 2022  
   
                                        Partners sexually transmitted infection   
status known 2022  
   
                                        Previously pregnant 0 time(s) 2022  
   
                                        No previous hospitalizations 2022  
   
                                        Chronic illness     2021  
   
                                        Exposure to COVID-19 2021  
   
                                        History of gynecologic disorder 20  
21  
   
                                        History of Polycystic Ovarian Syndrome (  
PCOS) 2021  
   
                                        History of anxiety disorder NOS 20  
21  
   
                                        History of migraine headache 2021  
   
                                        History of psychiatric disorders biopola  
r disorder 2021  
  
[facility]  
Work Phone: [phone]- Evaluation note  
  
Includes: Assessments for all patient encounters  
  
  
  
                                Findings        Encounter       Date  
   
                                                    Borderline personality disor  
danny Pt   
reported hx of sx/dx                     Established Patient with Haylie Aguilar LPCC-S                        2022  
   
                                                    Assessment of body mass inde  
x [Body mass   
index [BMI] 50.0-59.9, adult]           Open Access - Established with   
Julieth Pisano CNP                        2022  
   
                                                    Diabetes Risk Test Score was  
 three score   
2022                               Open Access - Established with   
Julieth Pisano CNP                        2022  
   
                                                    Bipolar I disorder, most rec  
ent episode,   
manic                                    Established Patient with   
Avis Felder LPCC-S                2021  
   
                                        Borderline personality disorder  Estab  
lished Patient with   
Avissharmila Mcgees LPCC-S                2021  
   
                                        Post-traumatic stress disorder  Establ  
ished Patient with   
Avis Bandamons LPCC-S                2021  
   
                                                    Assessment of visit for: bhargavi myles for   
human immunodeficiency virus            Medical Established Patient with   
Doreen Deborah CNP                        2021  
   
                                        Nicotine dependence Medical Established   
Patient with   
Doreen Deborah CNP                        2021  
   
                                        Tachycardia         Medical Established   
Patient with   
Doreen Deborah CNP                        2021  
   
                                                    Z68.43 - Body mass index [BM  
I]   
50.0-59.9, adult                        Medical Established Patient with   
Doreen Deborah CNP                        2021  
   
                                                    Bipolar I disorder, most rec  
ent episode,   
manic                                    Established Patient with   
Leonie Short LISWS                     2021  
   
                                                    Borderline personality disor  
danny per   
patient reported history                 Established Patient with   
Leonie Short LISWS                     2021  
   
                                        Nicotine dependence  Established Patie  
nt with   
Leonie Short LISWS                     2021  
   
                                        Post-traumatic stress disorder  Establ  
ished Patient with   
Leonie Short LISWS                     2021  
   
                                        Body mass index     Medical Established   
Patient with   
Doreen Deborah CNP                        2021  
   
                                        Morbid obesity      Medical Established   
Patient with   
Doreen Deborah CNP                        2021  
   
                                        Nicotine dependence uncomplicated Medica  
l Established Patient with   
Doreen Deborah CNP                        2021  
   
                                                    Z68.42 - Body mass index [BM  
I]   
45.0-49.9, adult                        Medical Established Patient with   
Doreen Deborah CNP                        2021  
   
                                Episodic mood disorders On Call with Pamela edwards CNP 2021  
   
                                                    Mood disorders, NOS per tabatha  
ent reported   
history                                  Established Patient with   
Leonie Short LISWS                     2021  
   
                                        Post-traumatic stress disorder  Establ  
ished Patient with   
Leonie Short LISWS                     2021  
   
                                        Morbid obesity      Medical Established   
Patient with   
Doreen Deborah CNP                        2021  
   
                                        Otitis externa      Medical Established   
Patient with   
Doreen Deborah CNP                        2021  
   
                                                    Z68.42 - Body mass index [BM  
I]   
45.0-49.9, adult                        Medical Established Patient with   
Doreen Deborah CNP                        2021  
   
                                        Post-traumatic stress disorder BH Establ  
ished Patient with   
Leonie Short LISWS                     06/10/2021  
   
                                        Morbid obesity      Medical Established   
Patient with   
Doreen Deborah CNP                        06/10/2021  
   
                                                    Z68.42 - Body mass index [BM  
I]   
45.0-49.9, adult                        Medical Established Patient with   
Doreen Cabrera CNP                        06/10/2021  
   
                                        Post-traumatic stress disorder BH Establ  
ished Patient with   
Leonie Short LISWS                     2021  
   
                                                    Assessment of visit for: scr  
eening for   
human immunodeficiency virus            Medical New Patient with Doreen Cabrera CNP                              2021  
   
                                                    Diabetes Risk Test Score was  
 one score   
2021                               Medical New Patient with Doreen Cabrera CNP                              2021  
   
                                        Hypertension        Medical New Patient   
with Doreen Cabrera CNP                              2021  
   
                                        Morbid obesity      Medical New Patient   
with Doreen Cabrera CNP                              2021  
   
                                        Post-traumatic stress disorder Medical N  
ew Patient with Doreen Cabrera CNP                              2021  
   
                                                    Z68.42 - Body mass index [BM  
I]   
45.0-49.9, adult                        Medical New Patient with Doreen Cabrera CNP                              2021  
  
[facility]  
Work Phone: [phone]- Reason for referral (narrative)*   
  
                          Date         Encounter Description Provider     Reason  
 for Referral  
   
                          22      Established Patient Haylie Jeff MOYA  
CC-S Referral To Mental Health  
 Team  
   
                          21     Medical New Patient Doreen Cabrera CNP Refe  
rral To Mental Health Team  
  
  
[facility]  
Work Phone: [phone]- History of Present illness Narrative* Rosie Goldberg - 10/04/2021 1:30 PM EDT  
  
Formatting of this note might be different from the original.  
Explained Holter monitor and diary.  
  
  
  
Electronically signed by Rosie Goldberg at 10/04/2021 2:23 PM EDT  
  
  
[facility]  
[facility] Phone: [phone]- Evaluation note  
  
Includes: Assessments for all patient encounters  
  
  
  
                                Findings        Encounter       Date  
   
                                                    Bipolar I disorder, most rec  
ent episode,   
manic                                    Established Patient with   
Avis Felder [facility]                2021  
   
                                        Borderline personality disorder  Estab  
lished Patient with   
Avis Felder [facility]                2021  
   
                                                    Assessment of visit for: scr  
eening for   
human immunodeficiency virus            Medical Established Patient with   
Doreen Cabrera LARISSA                        2021  
   
                                        Nicotine dependence Medical Established   
Patient with   
Doreen Cabrera LARISSA                        2021  
   
                                        Tachycardia         Medical Established   
Patient with   
Doreen Deborah CNP                        2021  
   
                                                    Z68.43 - Body mass index [BM  
I]   
50.0-59.9, adult                        Medical Established Patient with   
Doreen Deborah CNP                        2021  
   
                                                    Bipolar I disorder, most rec  
ent episode,   
manic                                    Established Patient with   
Leonie Short LISWS                     2021  
   
                                                    Borderline personality disor  
danny per   
patient reported history                 Established Patient with   
Leonie Short LISWS                     2021  
   
                                        Nicotine dependence BH Established Patie  
nt with   
Leonie Short LISWS                     2021  
   
                                        Post-traumatic stress disorder  Establ  
ished Patient with   
Leonie Short LISWS                     2021  
   
                                        Body mass index     Medical Established   
Patient with   
Doreen Deborah CNP                        2021  
   
                                        Morbid obesity      Medical Established   
Patient with   
Doreen Deborah CNP                        2021  
   
                                        Nicotine dependence uncomplicated Medica  
l Established Patient with   
Doreen Deborah CNP                        2021  
   
                                                    Z68.42 - Body mass index [BM  
I]   
45.0-49.9, adult                        Medical Established Patient with   
Doreen Deborah CNP                        2021  
   
                                Episodic mood disorders On Call with Pamela edwards CNP 2021  
   
                                                    Mood disorders, NOS per tabatha  
ent reported   
history                                  Established Patient with   
Leonie Short LISWS                     2021  
   
                                        Post-traumatic stress disorder  Establ  
ished Patient with   
Leonie Short LISWS                     2021  
   
                                        Morbid obesity      Medical Established   
Patient with   
Doreen Deborah CNP                        2021  
   
                                        Otitis externa      Medical Established   
Patient with   
Doreen Deborah CNP                        2021  
   
                                                    Z68.42 - Body mass index [BM  
I]   
45.0-49.9, adult                        Medical Established Patient with   
Doreen Deborah CNP                        2021  
   
                                        Post-traumatic stress disorder BH Establ  
ished Patient with   
Leonie Short LISWS                     06/10/2021  
   
                                        Morbid obesity      Medical Established   
Patient with   
Doreen Deborah CNP                        06/10/2021  
   
                                                    Z68.42 - Body mass index [BM  
I]   
45.0-49.9, adult                        Medical Established Patient with   
Doreen Deborah CNP                        06/10/2021  
   
                                        Post-traumatic stress disorder BH Establ  
ished Patient with   
Leonie Short LISWS                     2021  
   
                                                    Assessment of visit for: bhargavi myles for   
human immunodeficiency virus            Medical New Patient with Doreen   
Deborah CNP                              2021  
   
                                                    Diabetes Risk Test Score was  
 one score   
2021                               Medical New Patient with Doreen Cabrera CNP                              2021  
   
                                        Hypertension        Medical New Patient   
with Doreenpaola Mccormacken CNP                              2021  
   
                                        Morbid obesity      Medical New Patient   
with Doreen   
Deborah CNP                              2021  
   
                                        Post-traumatic stress disorder Medical N  
ew Patient with Doreen   
Deborah CNP                              2021  
   
                                                    Z68.42 - Body mass index [BM  
I]   
45.0-49.9, adult                        Medical New Patient with Doreenpaola Cabrera CNP                              2021  
  
[facility]  
Work Phone: [phone]- Evaluation note  
  
Includes: Assessments for all patient encounters  
  
  
  
                                Findings        Encounter       Date  
   
                                                    Bipolar I disorder, most rec  
ent episode,   
manic                                   BH Established Patient with   
Avis Felder LPCC-S                2021  
   
                                        Borderline personality disorder BH Estab  
lished Patient with   
Avis Felder LPCC-S                2021  
   
                                        Post-traumatic stress disorder  Establ  
ished Patient with   
Avis Felder LPCC-S                2021  
   
                                                    Assessment of visit for: bhargavi myles for   
human immunodeficiency virus            Medical Established Patient with   
Doreen Deborah CNP                        2021  
   
                                        Nicotine dependence Medical Established   
Patient with   
Doreen Deborah CNP                        2021  
   
                                        Tachycardia         Medical Established   
Patient with   
Doreen Deborah CNP                        2021  
   
                                                    Z68.43 - Body mass index [BM  
I]   
50.0-59.9, adult                        Medical Established Patient with   
Doreen Deborah CNP                        2021  
   
                                                    Bipolar I disorder, most rec  
ent episode,   
manic                                    Established Patient with   
Leonie Short LISWS                     2021  
   
                                                    Borderline personality disor  
danny per   
patient reported history                BH Established Patient with   
Leonie Short LISWS                     2021  
   
                                        Nicotine dependence  Established Patie  
nt with   
Leonie Short LISWS                     2021  
   
                                        Post-traumatic stress disorder  Establ  
ished Patient with   
Leonie Short LISWS                     2021  
   
                                        Body mass index     Medical Established   
Patient with   
Doreen Deborah CNP                        2021  
   
                                        Morbid obesity      Medical Established   
Patient with   
Doreen Deborah CNP                        2021  
   
                                        Nicotine dependence uncomplicated Medica  
l Established Patient with   
Doreen Deborah CNP                        2021  
   
                                                    Z68.42 - Body mass index [BM  
I]   
45.0-49.9, adult                        Medical Established Patient with   
Doreen Deborah CNP                        2021  
   
                                Episodic mood disorders On Call with Pamela edwards CNP 2021  
   
                                                    Mood disorders, NOS per tabatha  
ent reported   
history                                 BH Established Patient with   
Leonie Short LISWS                     2021  
   
                                        Post-traumatic stress disorder  Establ  
ished Patient with   
Leonie Short LISWS                     2021  
   
                                        Morbid obesity      Medical Established   
Patient with   
Doreen Deborah CNP                        2021  
   
                                        Otitis externa      Medical Established   
Patient with   
Doreen Deborah CNP                        2021  
   
                                                    Z68.42 - Body mass index [BM  
I]   
45.0-49.9, adult                        Medical Established Patient with   
Doreen Deborah CNP                        2021  
   
                                        Post-traumatic stress disorder  Establ  
ished Patient with   
Leonie Short LISWS                     06/10/2021  
   
                                        Morbid obesity      Medical Established   
Patient with   
Doreen Deborah CNP                        06/10/2021  
   
                                                    Z68.42 - Body mass index [BM  
I]   
45.0-49.9, adult                        Medical Established Patient with   
Doreen Deborah CNP                        06/10/2021  
   
                                        Post-traumatic stress disorder  Establ  
ished Patient with   
Leonie Short LISWS                     2021  
   
                                                    Assessment of visit for: bhargavi myles for   
human immunodeficiency virus            Medical New Patient with Doreen   
Deborah [facility]                              2021  
   
                                                    Diabetes Risk Test Score was  
 one score   
2021                               Medical New Patient with Doreen   
Deborah [facility]                              2021  
   
                                        Hypertension        Medical New Patient   
with Doreen   
Deborah CNP                              2021  
   
                                        Morbid obesity      Medical New Patient   
with Doreen   
Deborah [facility]                              2021  
   
                                        Post-traumatic stress disorder Medical N  
ew Patient with Doreen   
Deborah [facility]                              2021  
   
                                                    Z68.42 - Body mass index [BM  
I]   
45.0-49.9, adult                        Medical New Patient with Doreen   
Deborah [facility]                              2021  
  
[facility]  
Work Phone: [phone]- Evaluation note  
  
Includes: Assessments for all patient encounters  
  
  
  
                                Findings        Encounter       Date  
   
                                                    Bipolar I disorder, most rec  
ent episode,   
manic                                    Established Patient with   
Leonie Short LISWS                     2021  
   
                                                    Borderline personality disor  
danny per   
patient reported history                 Established Patient with   
Leonie Short LISWS                     2021  
   
                                        Nicotine dependence  Established Patie  
nt with   
Leonie Short LISWS                     2021  
   
                                        Post-traumatic stress disorder BH Establ  
ished Patient with   
Leonie Short LISWS                     2021  
   
                                        Body mass index     Medical Established   
Patient with   
Doreen Deborah CNP                        2021  
   
                                        Morbid obesity      Medical Established   
Patient with   
Doreen Deborah CNP                        2021  
   
                                        Nicotine dependence uncomplicated Medica  
l Established Patient with   
Doreen Deborah CNP                        2021  
   
                                                    Z68.42 - Body mass index [BM  
I]   
45.0-49.9, adult                        Medical Established Patient with   
Doreen Deborah CNP                        2021  
   
                                Episodic mood disorders On Call with Pamela edwards CNP 2021  
   
                                                    Mood disorders, NOS per tabatha  
ent reported   
history                                 BH Established Patient with   
Leonie Short LISWS                     2021  
   
                                        Post-traumatic stress disorder BH Establ  
ished Patient with   
Leonie Short LISWS                     2021  
   
                                        Morbid obesity      Medical Established   
Patient with   
Doreen Deborah CNP                        2021  
   
                                        Otitis externa      Medical Established   
Patient with   
Doreen Deborah CNP                        2021  
   
                                                    Z68.42 - Body mass index [BM  
I]   
45.0-49.9, adult                        Medical Established Patient with   
Doreen Deborah CNP                        2021  
   
                                        Post-traumatic stress disorder BH Establ  
ished Patient with   
Leonie Short LISWS                     06/10/2021  
   
                                        Morbid obesity      Medical Established   
Patient with   
Doreen Deborah CNP                        06/10/2021  
   
                                                    Z68.42 - Body mass index [BM  
I]   
45.0-49.9, adult                        Medical Established Patient with   
Doreen Deborah CNP                        06/10/2021  
   
                                        Post-traumatic stress disorder  Establ  
ished Patient with   
Leonie Short LISWS                     2021  
   
                                                    Assessment of visit for: bhargavi myles for   
human immunodeficiency virus            Medical New Patient with Doreen   
Deborah CNP                              2021  
   
                                                    Diabetes Risk Test Score was  
 one score   
2021                               Medical New Patient with Doreen   
Deborah CNP                              2021  
   
                                        Hypertension        Medical New Patient   
with Doreen   
Deborah CNP                              2021  
   
                                        Morbid obesity      Medical New Patient   
with Doreen   
Deborah CNP                              2021  
   
                                        Post-traumatic stress disorder Medical N  
ew Patient with Doreen   
Deborah CNP                              2021  
   
                                                    Z68.42 - Body mass index [BM  
I]   
45.0-49.9, adult                        Medical New Patient with Doreen   
Deborah CNP                              2021  
  
[facility]  
Work Phone: [phone]- History general Narrative - Reported  
  
Includes: Medical History in patient's chart  
  
  
  
                                        Description         Last Updated  
   
                                        Chronic illness     2021  
   
                                        Exposure to COVID-19 2021  
   
                                        Previous hospitalizations 2021  
   
                                        History of gynecologic disorder 20  
21  
   
                                        History of Polycystic Ovarian Syndrome (  
PCOS) 2021  
   
                                        History of anxiety disorder NOS 20  
21  
   
                                        History of migraine headache 2021  
   
                                        History of psychiatric disorders biopola  
r disorder 2021  
  
[facility]  
Work Phone: [phone]- Evaluation note  
  
Includes: Assessments for all patient encounters  
  
  
  
                                Findings        Encounter       Date  
   
                                Episodic mood disorders On Call with Pamela edwards CNP 2021  
   
                                                    Mood disorders, NOS per tabatha  
ent reported   
history                                  Established Patient with   
Leonie Short LISWS                     2021  
   
                                        Post-traumatic stress disorder  Establ  
ished Patient with   
Leonie Short LISWS                     2021  
   
                                        Morbid obesity      Medical Established   
Patient with   
Doreen Deborah CNP                        2021  
   
                                        Otitis externa      Medical Established   
Patient with   
Doreen Deborah CNP                        2021  
   
                                                    Z68.42 - Body mass index [BM  
I]   
45.0-49.9, adult                        Medical Established Patient with   
Doreen Deborah CNP                        2021  
   
                                        Post-traumatic stress disorder  Establ  
ished Patient with   
Leonie Short LISWS                     06/10/2021  
   
                                        Morbid obesity      Medical Established   
Patient with   
Doreen Deborah CNP                        06/10/2021  
   
                                                    Z68.42 - Body mass index [BM  
I]   
45.0-49.9, adult                        Medical Established Patient with   
Doreen Deborah CNP                        06/10/2021  
   
                                        Post-traumatic stress disorder  Establ  
ished Patient with   
Leonie Short LISWS                     2021  
   
                                                    Assessment of visit for: bhargavi myles for   
human immunodeficiency virus            Medical New Patient with Doreen   
Deborah CNP                              2021  
   
                                                    Diabetes Risk Test Score was  
 one score   
2021                               Medical New Patient with Doreen   
Deborah CNP                              2021  
   
                                        Hypertension        Medical New Patient   
with Doreen   
Deborah CNP                              2021  
   
                                        Morbid obesity      Medical New Patient   
with Doreen   
Deborah CNP                              2021  
   
                                        Post-traumatic stress disorder Medical N  
ew Patient with Doreen   
Deborah CNP                              2021  
   
                                                    Z68.42 - Body mass index [BM  
I]   
45.0-49.9, adult                        Medical New Patient with Doreen   
Deborah CNP                              2021  
  
[facility]  
Work Phone: [phone]- Evaluation note  
  
Includes: Assessments for all patient encounters  
  
  
  
                                Findings        Encounter       Date  
   
                                                    Mood disorders, NOS per tabatha  
ent reported   
history                                 BH Established Patient with   
Leonie Short LISWS                     2021  
   
                                        Post-traumatic stress disorder  Establ  
ished Patient with   
Leonie Short LISWS                     2021  
   
                                        Morbid obesity      Medical Established   
Patient with   
Doreen Deborah CNP                        2021  
   
                                        Otitis externa      Medical Established   
Patient with   
Doreen Deborah CNP                        2021  
   
                                                    Z68.42 - Body mass index [BM  
I]   
45.0-49.9, adult                        Medical Established Patient with   
Doreen Deborah CNP                        2021  
   
                                        Post-traumatic stress disorder BH Establ  
ished Patient with   
Leonie Short LISWS                     06/10/2021  
   
                                        Morbid obesity      Medical Established   
Patient with   
Doreen Deborah CNP                        06/10/2021  
   
                                                    Z68.42 - Body mass index [BM  
I]   
45.0-49.9, adult                        Medical Established Patient with   
Doreen Deborah CNP                        06/10/2021  
   
                                        Post-traumatic stress disorder BH Establ  
ished Patient with   
Leonie Short LISWS                     2021  
   
                                                    Assessment of visit for: bhargavi myles for   
human immunodeficiency virus            Medical New Patient with Doreen   
Deborah CNP                              2021  
   
                                                    Diabetes Risk Test Score was  
 one score   
2021                               Medical New Patient with Doreen   
Deborah CNP                              2021  
   
                                        Hypertension        Medical New Patient   
with Doreen   
Deborah CNP                              2021  
   
                                        Morbid obesity      Medical New Patient   
with Doreen   
Deborah CNP                              2021  
   
                                        Post-traumatic stress disorder Medical N  
ew Patient with Doreen   
Deborah CNP                              2021  
   
                                                    Z68.42 - Body mass index [BM  
I]   
45.0-49.9, adult                        Medical New Patient with Doreen   
Deborah CNP                              2021  
  
[facility]  
Work Phone: [phone]- History general Narrative - Reported  
  
Includes: Medical History in patient's chart  
  
  
  
                                        Description         Last Updated  
   
                                        Exposure to COVID-19 2021  
   
                                        Previous hospitalizations 2021  
   
                                        History of gynecologic disorder 20  
21  
   
                                        History of Polycystic Ovarian Syndrome (  
PCOS) 2021  
   
                                        History of anxiety disorder NOS 20  
21  
   
                                        History of migraine headache 2021  
   
                                        History of psychiatric disorders biopola  
r disorder 2021  
  
[facility]  
Work Phone: [phone]- History general Narrative - Reported  
  
Includes: Medical History in patient's chart  
  
  
  
                                        Description         Last Updated  
   
                                        Chronic illness     2021  
   
                                        Exposure to COVID-19 2021  
   
                                        Previous hospitalizations 2021  
   
                                        History of gynecologic disorder 20  
21  
   
                                        History of Polycystic Ovarian Syndrome (  
PCOS) 2021  
   
                                        History of anxiety disorder NOS 20  
21  
   
                                        History of migraine headache 2021  
   
                                        History of psychiatric disorders biopola  
r disorder 2021  
  
[facility]  
Work Phone: [phone]- Evaluation note  
  
Includes: Assessments for all patient encounters  
  
  
  
                                Findings        Encounter       Date  
   
                                        Morbid obesity      Medical Established   
Patient with   
Doreen Deborah CNP                        06/10/2021  
   
                                                    Z68.42 - Body mass index [BM  
I]   
45.0-49.9, adult                        Medical Established Patient with   
Doreen Deborah CNP                        06/10/2021  
   
                                        Post-traumatic stress disorder BH Establ  
ished Patient with   
Leonie Short LISWS                     2021  
   
                                                    Assessment of visit for: bhargavi myles for   
human immunodeficiency virus            Medical New Patient with Doreen   
Deborah CNP                              2021  
   
                                                    Diabetes Risk Test Score was  
 one score   
2021                               Medical New Patient with Doreen   
Deborah CNP                              2021  
   
                                        Hypertension        Medical New Patient   
with Doreen   
Deborah CNP                              2021  
   
                                        Morbid obesity      Medical New Patient   
with Doreen   
Deborah CNP                              2021  
   
                                        Post-traumatic stress disorder Medical N  
ew Patient with Doreen   
Deborah CNP                              2021  
   
                                                    Z68.42 - Body mass index [BM  
I]   
45.0-49.9, adult                        Medical New Patient with Doreen   
Deborah CNP                              2021  
  
[facility]  
Work Phone: [phone]- Evaluation note  
  
Includes: Assessments for all patient encounters  
  
  
  
                                Findings        Encounter       Date  
   
                                        Post-traumatic stress disorder BH Establ  
ished Patient with   
Leonie Short LISWS                     2021  
   
                                                    Assessment of visit for: bhargavi myles for   
human immunodeficiency virus            Medical New Patient with Doreen Cabrera CNP                              2021  
   
                                                    Diabetes Risk Test Score was  
 one score   
2021                               Medical New Patient with Doreen Cabrera CNP                              2021  
   
                                        Hypertension        Medical New Patient   
with Doreen Cabrera CNP                              2021  
   
                                        Morbid obesity      Medical New Patient   
with Doreen Cabrera CNP                              2021  
   
                                        Post-traumatic stress disorder Medical N  
ew Patient with Doreen Cabrera CNP                              2021  
   
                                                    Z68.42 - Body mass index [BM  
I]   
45.0-49.9, adult                        Medical New Patient with Doreen Cabrera CNP                              2021  
  
[facility]  
Work Phone: [phone]- History general Narrative - Reported  
  
Includes: Medical History in patient's chart  
  
  
  
                                        Description         Last Updated  
   
                                        History of gynecologic disorder 20  
21  
   
                                        History of Polycystic Ovarian Syndrome (  
PCOS) 2021  
   
                                        History of anxiety disorder NOS 20  
21  
   
                                        History of migraine headache 2021  
   
                                        History of psychiatric disorders biopola  
r disorder 2021  
  
[employer] [facility]  
Work Phone: [phone]- History general Narrative - Reported  
  
Includes: Medical History in patient's chart  
  
  
  
                                        Description         Last Updated  
   
                                        Exposure to COVID-19 2021  
   
                                        Previous hospitalizations 2021  
   
                                        History of gynecologic disorder 20  
21  
   
                                        History of Polycystic Ovarian Syndrome (  
PCOS) 2021  
   
                                        History of anxiety disorder NOS 20  
21  
   
                                        History of migraine headache 2021  
   
                                        History of psychiatric disorders biopola  
r disorder 2021  
  
[facility]  
Work Phone: [phone] note*   
  
                                                    Diagnosis  
   
                                                      
  
  
Depression with suicidal ideation- Primary  
  
documented in this encounter  
[facility] [phone] note  
  
Includes: Assessments for all patient encounters  
  
  
  
                                Findings        Encounter       Date  
   
                                        Morbid obesity      Medical Established   
Patient with   
Doreen Cabrera CNP                        2021  
   
                                        Otitis externa      Medical Established   
Patient with   
Doreen Cabrera CNP                        2021  
   
                                                    Z68.42 - Body mass index [BM  
I]   
45.0-49.9, adult                        Medical Established Patient with   
Doreen Cabrera CNP                        2021  
   
                                        Post-traumatic stress disorder BH Establ  
ished Patient with   
Leonie Short LISWS                     06/10/2021  
   
                                        Morbid obesity      Medical Established   
Patient with   
Doreen Deborah CNP                        06/10/2021  
   
                                                    Z68.42 - Body mass index [BM  
I]   
45.0-49.9, adult                        Medical Established Patient with   
Doreen Deborah CNP                        06/10/2021  
   
                                        Post-traumatic stress disorder BH Establ  
ished Patient with   
Leonie Short LISWS                     2021  
   
                                                    Assessment of visit for: bhargavi myles for   
human immunodeficiency virus            Medical New Patient with Doreen   
Deborah CNP                              2021  
   
                                                    Diabetes Risk Test Score was  
 one score   
2021                               Medical New Patient with Doreen   
Deborah CNP                              2021  
   
                                        Hypertension        Medical New Patient   
with Doreen   
Deborah CNP                              2021  
   
                                        Morbid obesity      Medical New Patient   
with Doreen   
Deborah CNP                              2021  
   
                                        Post-traumatic stress disorder Medical N  
ew Patient with Doreen   
Deborah CNP                              2021  
   
                                                    Z68.42 - Body mass index [BM  
I]   
45.0-49.9, adult                        Medical New Patient with Doreen   
Deborah [facility]                              2021  
  
[facility]  
Work Phone: [phone] note*   
  
                                                    Diagnosis  
   
                                                      
  
  
Anxiety state- Primary  
  
  
Anxiety state, unspecified  
  
documented in this encounter  
[employer] Phone: [phone] note*   
  
                                                    Diagnosis  
   
                                                      
  
  
Bipolar 1 disorder (HCC)- Primary  
  
  
Bipolar I disorder, most recent episode (or current) unspecified  
   
                                                      
  
  
Homicidal ideation  
  
documented in this encounter  
[employer] Phone: [phone] note*   
  
                                                    Diagnosis  
   
                                                      
  
  
Tachycardia  
  
  
Tachycardia, unspecified  
  
documented in this encounter  
[employer] Phone: [phone] note  
  
Includes: Assessments for all patient encounters  
  
  
  
                                Findings        Encounter       Date  
   
                                                    [D50.9 - Iron deficiency ane  
jennifer,   
unspecified] iron deficiency anemia Chart Update with Doreen Cabrera CNP   
10/07/2024  
  
  
  
                                                    Last Documented On 10/07/202  
4 12:07PM ; [facility]  
  
  
  
                                        Attention-deficit hyperactivity disorder  
  Established Patient with Radha Young LSW                               2024  
  
  
  
                                                    Last Documented On   
4 4:15PM ; [facility]  
  
  
  
                                                    Bipolar I disorder, most rec  
ent   
episode, depressed - mild                Established Patient with Radha Young LSW                               2024  
  
  
  
                                                    Last Documented On   
4 4:15PM ; [facility]  
  
  
  
                                Nicotine dependence  Established Patient with   
Radhaleslie Young LSW 2024  
  
  
  
                                                    Last Documented On   
4 4:15PM ; [facility]  
  
  
  
                                        Post-traumatic stress disorder  Establ  
ished Patient with Radha Young   
LSW                                     2024  
  
  
  
                                                    Last Documented On   
4 4:15PM ; [facility]  
  
  
  
                                                    [Z68.43 - Body mass index [B  
MI]   
50.0-59.9, adult] assessment of body   
mass index                              Medical Established Patient with   
Doreen Cabrera CNP                        2024  
  
  
  
                                                    Last Documented On 10/04/202  
4 1:56PM ; [facility]  
  
  
  
                                                    Bipolar I disorder, most rec  
ent   
episode, depressed - mild               Medical Established Patient with Doreenpaola Mccormacken CNP                              2024  
  
  
  
                                                    Last Documented On 10/04/202  
4 1:56PM ; [facility]  
  
  
  
                                Caries          Medical Established Patient with  
 Doreen Deborah CNP 2024  
  
  
  
                                                    Last Documented On 10/04/202  
4 1:56PM ; [facility]  
  
  
  
                                        Encounter for Immunization Medical Estab  
lished Patient with Doreen Deborah   
CNP                                     2024  
  
  
  
                                                    Last Documented On 10/04/202  
4 1:56PM ; [facility]  
  
  
  
                                                    Type 2 diabetes mellitus wit  
hout   
complication                            Medical Established Patient with   
Doreen Deborah CNP                        2024  
  
  
  
                                                    Last Documented On 10/04/202  
4 1:56PM ; [facility]  
  
  
  
                                        Attention-deficit hyperactivity disorder  
  Established Patient with   
Leonie Short LISWS                     2024  
  
  
  
                                                    Last Documented On   
4 3:28PM ; [facility]  
  
  
  
                                                    Bipolar I disorder, most rec  
ent   
episode, depressed - mild                Established Patient with Leonie   
Short LISWS                             2024  
  
  
  
                                                    Last Documented On   
4 3:28PM ; [facility]  
  
  
  
                                        Post-traumatic stress disorder  Establ  
ished Patient with Leonie Short   
LISWS                                   2024  
  
  
  
                                                    Last Documented On   
4 3:28PM ; [facility]  
  
  
  
                                                    [E11.9 - Type 2 diabetes olbito  
litus   
without complications] type 2 diabetes   
mellitus                                Medical Established Patient with   
Doreen Cabrera CNP                        2024  
  
  
  
                                                    Last Documented On   
4 7:36PM ; [facility]  
  
  
  
                                                    [F41.1 - Generalized anxiety  
   
disorder] generalized anxiety   
disorder                                Medical Established Patient with   
Doreen Cabrera CNP                        2024  
  
  
  
                                                    Last Documented On   
4 7:36PM ; [facility]  
  
  
  
                                                    [I10 - Essential (primary)   
hypertension] essential hypertension    Medical Established Patient with   
Doreen Cabrera CNP                        2024  
  
  
  
                                                    Last Documented On   
4 7:36PM ; [facility]  
  
  
  
                                                    [N94.6 - Dysmenorrhea, unspe  
cified]   
dysmenorrhea                            Medical Established Patient with   
Doreen Cabrera CNP                        2024  
  
  
  
                                                    Last Documented On   
4 7:36PM ; [facility]  
  
  
  
                                                    [Z68.43 - Body mass index [B  
MI]   
50.0-59.9, adult] assessment of body   
mass index                              Medical Established Patient with   
Doreen Cabrera CNP                        2024  
  
  
  
                                                    Last Documented On   
4 7:36PM ; [facility]  
  
  
  
                                        Encounter for Immunization Medical Estab  
lished Patient with Doreen Cabrera   
CNP                                     2024  
  
  
  
                                                    Last Documented On   
4 7:36PM ; [facility]  
  
  
  
                                        Venipuncture was performed Medical Estab  
lished Patient with Doreen Cabrera   
CNP                                     2024  
  
  
  
                                                    Last Documented On   
4 7:36PM ; [facility]  
  
  
  
                                        Attention-deficit hyperactivity disorder  
  Established Patient with   
Leonie Short LISWS                     2024  
  
  
  
                                                    Last Documented On   
4 10:10AM ; [facility]  
  
  
  
                                                    Bipolar I disorder, most rec  
ent   
episode, depressed - mild                Established Patient with Leonie   
Short LISWS                             2024  
  
  
  
                                                    Last Documented On   
4 10:10AM ; [facility]  
  
  
  
                                        Post-traumatic stress disorder  Establ  
ished Patient with Leonie Short   
LISWS                                   2024  
  
  
  
                                                    Last Documented On   
4 10:10AM ; [facility]  
  
  
  
                                        [D64.9 - Anemia, unspecified] anemia Med  
ical Established Patient with   
Doreen Cabrera CNP                        2024  
  
  
  
                                                    Last Documented On   
4 6:51PM ; [facility]  
  
  
  
                                                    [Z68.43 - Body mass index [B  
MI]   
50.0-59.9, adult] assessment of body   
mass index                              Medical Established Patient with   
Doreen Cabrera CNP                        2024  
  
  
  
                                                    Last Documented On   
4 6:51PM ; [facility]  
  
  
  
                                                    Bipolar affective disorder,   
current   
episode depressed, mild                 BH Established Patient with Leonie   
Short LISWS                             2024  
  
  
  
                                                    Last Documented On   
4 5:16PM ; [facility]  
  
  
  
                                        Post-traumatic stress disorder BH Establ  
ished Patient with Leonie Short   
LISWS                                   2024  
  
  
  
                                                    Last Documented On   
4 5:16PM ; [facility]  
  
  
  
                                                    Undifferentiated attention d  
eficit   
disorder                                 Established Patient with   
Leonie Short LISWS                     2024  
  
  
  
                                                    Last Documented On   
4 5:16PM ; [facility]  
  
  
  
                                        Visit for: screening for disorder BH Est  
ablished Patient with Leonie   
Short LISWS                             2024  
  
  
  
                                                    Last Documented On   
4 5:16PM ; [facility]  
  
  
  
                                                    [Z68.43 - Body mass index [B  
MI]   
50.0-59.9, adult] assessment of body   
mass index                              Medical Established Patient with   
Doreen Cabrera CNP                        2024  
  
  
  
                                                    Last Documented On   
4 7:50PM ; [facility]  
  
  
  
                                        Attention-deficit hyperactivity disorder  
 Medical Established Patient with   
Doreen Cabrera CNP                        2024  
  
  
  
                                                    Last Documented On   
4 7:50PM ; [facility]  
  
  
  
                                                    Diabetes Risk Test Score was  
 three   
score 3/27/2024                         Medical Established Patient with Doreen Cabrera CNP                              2024  
  
  
  
                                                    Last Documented On   
4 7:50PM ; [facility]  
  
  
  
                                        Visit for: screening for STD Medical Est  
ablished Patient with Doreen Cabrera   
CNP                                     2024  
  
  
  
                                                    Last Documented On   
4 7:50PM ; [facility]  
  
  
  
                                                    [Z68.32 - Body mass index [B  
MI]   
32.0-32.9, adult] assessment of body   
mass index                              Medical Established Patient with   
Doreen Cabrera CNP                        2023  
  
  
  
                                                    Last Documented On   
3 6:01PM ; [facility]  
  
  
  
                                        Borderline personality disorder Medical   
Established Patient with Doreen   
Deborah CNP                              2023  
  
  
  
                                                    Last Documented On   
3 6:01PM ; [facility]  
  
  
  
                                        Screening for diabetes mellitus Medical   
Established Patient with Doreenpaola Cabrera CNP                              2023  
  
  
  
                                                    Last Documented On   
3 6:01PM ; [facility]  
  
  
  
                                        Assessment of body mass index Medical Es  
tablished Patient with Doreenpaola Cabrera CNP                              10/21/2022  
  
  
  
                                                    Last Documented On 10/21/202  
2 2:45PM ; [facility]  
  
  
  
                                                    Bipolar affective disorder,   
current   
episode manic                            [facility] with Haylienicanor Aguilar LPCC-S                        2022  
  
  
  
                                                    Last Documented On   
2 3:22PM ; [facility]  
  
  
  
                                                    Bipolar affective disorder,   
current   
episode manic                           BH Established Patient with Haylie   
Aguilar LPCC-S                        2022  
  
  
  
                                                    Last Documented On   
2 2:28PM ; [facility]  
  
  
  
                                                    Bipolar affective disorder,   
current   
episode depressed, severe with   
psychosis                                [facility] with Haylie   
Aguilar LPCC-S                        2022  
  
  
  
                                                    Last Documented On   
2 3:29PM ; [facility]  
  
  
  
                                                    Assessment of body mass inde  
x [Body   
mass index [BMI] 50.0-59.9, adult]      Open Access - Established with Julieth   
Aliya CNP                               2022  
  
  
  
                                                    Last Documented On   
2 9:36AM ; [facility]  
  
  
  
                                                    Bipolar I disorder, most rec  
ent   
episode, manic                          Open Access - Established with Julieth   
Aliya CNP                               2022  
  
  
  
                                                    Last Documented On   
2 9:36AM ; [facility]  
  
  
  
                                        Post-traumatic stress disorder BH Establ  
ished Patient with Haylie Aguilar   
LPCC-S                                  2022  
  
  
  
                                                    Last Documented On   
2 3:20PM ; [facility]  
  
  
  
                                No cough        Medical Established Patient with  
 Doreenpaola Cabrera CNP 2022  
  
  
  
                                                    Last Documented On   
2 4:04PM ; [facility]  
  
  
  
                                                    Z68.43 - Body mass index [BM  
I]   
50.0-59.9, adult                        Medical Established Patient with   
Doreenpaola Mccormacken CNP                        2022  
  
  
  
                                                    Last Documented On   
2 4:04PM ; [facility]  
  
  
  
                                                    Borderline personality disor  
danny Pt   
reported hx of sx/dx                    BH Established Patient with Haylie Aguilar LPCC-S                        2022  
  
  
  
                                                    Last Documented On   
2 4:08PM ; [facility]  
  
  
  
                                                    Assessment of body mass inde  
x [Body   
mass index [BMI] 50.0-59.9, adult]      Open Access - Established with Julieth Menaer CNP                               2022  
  
  
  
                                                    Last Documented On   
2 7:41PM ; [facility]  
  
  
  
                                                    Diabetes Risk Test Score was  
 three   
score 2022                         Open Access - Established with Julieth   
Aliya CNP                               2022  
  
  
  
                                                    Last Documented On   
2 7:41PM ; [facility]  
  
  
  
                                                    Bipolar I disorder, most rec  
ent   
episode, manic                           Established Patient with Avis   
Felder LPCC-S                          2021  
  
  
  
                                                    Last Documented On   
1 1:35AM ; [facility]  
  
  
  
                                        Borderline personality disorder  Estab  
lished Patient with Avis   
Felder LPCC-S                          2021  
  
  
  
                                                    Last Documented On   
1 1:35AM ; [facility]  
  
  
  
                                        Post-traumatic stress disorder  Establ  
ished Patient with Avis   
Felder LPCC-S                          2021  
  
  
  
                                                    Last Documented On   
1 1:35AM ; [facility]  
  
  
  
                                                    Assessment of visit for: bhargavi myles for   
human immunodeficiency virus            Medical Established Patient with   
Doreenpaola Mccormacken [facility]                        2021  
  
  
  
                                                    Last Documented On   
1 2:56PM ; [facility]  
  
  
  
                                Nicotine dependence Medical Established Patient   
with Doreen Deborah CNP 2021  
  
  
  
                                                    Last Documented On   
1 2:56PM ; [facility]  
  
  
  
                                Tachycardia     Medical Established Patient with  
 Doreen Deborah CNP 2021  
  
  
  
                                                    Last Documented On   
1 2:56PM ; [facility]  
  
  
  
                                                    Z68.43 - Body mass index [BM  
I]   
50.0-59.9, adult                        Medical Established Patient with   
Doreen Deborah CNP                        2021  
  
  
  
                                                    Last Documented On   
1 2:56PM ; [facility]  
  
  
  
                                                    Bipolar I disorder, most rec  
ent   
episode, manic                           Established Patient with Leonie   
Short LISWS                             2021  
  
  
  
                                                    Last Documented On   
1 10:17AM ; [facility]  
  
  
  
                                                    Borderline personality disor  
danny per   
patient reported history                 Established Patient with Leonie   
Short LISWS                             2021  
  
  
  
                                                    Last Documented On   
1 10:17AM ; [facility]  
  
  
  
                                Nicotine dependence BH Established Patient with   
Leonie Short LISWS 2021  
  
  
  
                                                    Last Documented On   
1 10:17AM ; [facility]  
  
  
  
                                        Post-traumatic stress disorder  Establ  
ished Patient with Leonie Short   
LISWS                                   2021  
  
  
  
                                                    Last Documented On   
1 10:17AM ; [facility]  
  
  
  
                                Body mass index Medical Established Patient with  
 Doreen Deborah CNP 2021  
  
  
  
                                                    Last Documented On   
1 5:23PM ; [facility]  
  
  
  
                                Morbid obesity  Medical Established Patient with  
 Doreen Deborah CNP 2021  
  
  
  
                                                    Last Documented On   
1 5:23PM ; [facility]  
  
  
  
                                        Nicotine dependence uncomplicated Medica  
l Established Patient with Doreen   
Deborah CNP                              2021  
  
  
  
                                                    Last Documented On   
1 5:23PM ; [facility]  
  
  
  
                                                    Z68.42 - Body mass index [BM  
I]   
45.0-49.9, adult                        Medical Established Patient with   
Doreen Deborah CNP                        2021  
  
  
  
                                                    Last Documented On   
1 5:23PM ; [facility]  
  
  
  
                                Episodic mood disorders On Call with Pamela edwards CNP 2021  
  
  
  
                                                    Last Documented On   
1 7:49AM ; [facility]  
  
  
  
                                                    Mood disorders, NOS per tabatha  
ent   
reported history                         Established Patient with Leonie   
Short LISWS                             2021  
  
  
  
                                                    Last Documented On   
1 7:15PM ; [facility]  
  
  
  
                                        Post-traumatic stress disorder  Establ  
ished Patient with Leonie Short   
LISWS                                   2021  
  
  
  
                                                    Last Documented On   
1 7:15PM ; [facility]  
  
  
  
                                Morbid obesity  Medical Established Patient with  
 Doreen Deborah CNP 2021  
  
  
  
                                                    Last Documented On   
1 12:31PM ; [facility]  
  
  
  
                                Otitis externa  Medical Established Patient with  
 Doreen Deborah CNP 2021  
  
  
  
                                                    Last Documented On   
1 12:31PM ; [facility]  
  
  
  
                                                    Z68.42 - Body mass index [BM  
I]   
45.0-49.9, adult                        Medical Established Patient with   
Doreen Deborah CNP                        2021  
  
  
  
                                                    Last Documented On   
1 12:31PM ; [facility]  
  
  
  
                                        Post-traumatic stress disorder  Establ  
ished Patient with Leonie Short   
LISWS                                   06/10/2021  
  
  
  
                                                    Last Documented On 06/10/202  
1 10:05PM ; [facility]  
  
  
  
                                Morbid obesity  Medical Established Patient with  
 Doreen Deborah CNP 06/10/2021  
  
  
  
                                                    Last Documented On 06/10/202  
1 4:45PM ; [facility]  
  
  
  
                                                    Z68.42 - Body mass index [BM  
I]   
45.0-49.9, adult                        Medical Established Patient with   
Doreen Deborah CNP                        06/10/2021  
  
  
  
                                                    Last Documented On 06/10/202  
1 4:45PM ; [facility]  
  
  
  
                                        Post-traumatic stress disorder BH Establ  
ished Patient with Leonie Short   
LISWS                                   2021  
  
  
  
                                                    Last Documented On   
1 11:59AM ; [facility]  
  
  
  
                                                    Assessment of visit for: bhargavi myles for   
human immunodeficiency virus            Medical New Patient with Doreen   
Deborah CNP                              2021  
  
  
  
                                                    Last Documented On   
1 4:06PM ; [facility]  
  
  
  
                                                    Diabetes Risk Test Score was  
 one score   
2021                               Medical New Patient with Doreen   
Deborah CNP                              2021  
  
  
  
                                                    Last Documented On   
1 4:06PM ; [facility]  
  
  
  
                                Hypertension    Medical New Patient with Doreen C  
cate CNP 2021  
  
  
  
                                                    Last Documented On   
1 4:06PM ; [facility]  
  
  
  
                                Morbid obesity  Medical New Patient with Doreen C  
cate CNP 2021  
  
  
  
                                                    Last Documented On   
1 4:06PM ; [facility]  
  
  
  
                                Post-traumatic stress disorder Medical New Patie  
nt with Doreen Deborah CNP   
2021  
  
  
  
                                                    Last Documented On   
1 4:06PM ; [facility]  
  
  
  
                                                    Z68.42 - Body mass index [BM  
I]   
45.0-49.9, adult                        Medical New Patient with Doreen   
Deborah CNP                              2021  
  
  
  
                                                    Last Documented On   
1 4:06PM ; [facility]  
Work Phone: [phone] noteNo assessment information available
[facility]  
Work Phone: [phone] of Present illness Narrative  
  
History of Present Illness not supported for this document type  
  
No History of Present Illness [facility]  
Work Phone: [phone] Discharge instructions* Instructions*   
  
Malika Shepherd MD - 2021  
  
  
  
Formatting of this note might be different from the original.  
Please take all medications as prescribed.  
  
Please follow up with your primary care physician by calling today, or as soon 
as possible, for thefirst available appointment. If you do not have a primary 
care physician, please contact a physician or clinic listed below today to 
establish care.  
  
Please return to the emergency department IMMEDIATELY if you develop 
uncontrolled fevers, uncontrolled vomiting, change in symptoms, worsening of 
symptoms, or ANY other concerns.  
  
  
  
  
* Attachments  
  
The following attachments cannot be sent through Care Everywhere.  
  
* Anxiety Disorder (English)  
  
documented in this [facility]  
Work Phone: [phone]  
  
Instructions not supported for this document type  
  
No Instructions [facility]  
Work Phone: [phone]  
  
Includes: Instructions for all patient encounters  
  
  
  
                                                    Education and Decision Aids   
were provided during visit for:  
   
                                                    Counseling/education [Use fo  
r free text]  
   
                                                    Last Documented On   
4 7:40PM ; [facility]  
   
                                                    Discussed nutritional needs   
teach healthy choices including fruits and   
vegetables  
   
                                                    Last Documented On   
4 7:14PM ; [facility]  
   
                                                    Patient education about a pr  
oper diet  
   
                                                    Last Documented On   
4 7:14PM ; [facility]  
   
                                                    Discussed concerns about exe  
rcise : promote physical activity  
   
                                                    Last Documented On   
4 7:14PM ; [facility]  
   
                                                    Not requesting contraception  
   
                                                    Last Documented On   
4 7:14PM ; [facility]  
   
                                                    Discussed nutritional needs   
teach healthy choices including fruits and   
vegetables  
   
                                                    Last Documented On   
3 5:15PM ; [facility]  
   
                                                    Patient education about a pr  
oper diet  
   
                                                    Last Documented On   
3 5:15PM ; [facility]  
   
                                                    Discussed concerns about exe  
rcise : promote physical activity  
   
                                                    Last Documented On   
3 5:15PM ; [facility]  
   
                                                    Discussed nutritional needs   
teach healthy choices including fruits and   
vegetables  
   
                                                    Last Documented On 10/21/202  
2 1:42PM ; [facility]  
   
                                                    Patient education about a pr  
oper diet  
   
                                                    Last Documented On 10/21/202  
2 1:42PM ; [facility]  
   
                                                    Discussed concerns about exe  
rcise : promote physical activity  
   
                                                    Last Documented On 10/21/202  
2 1:42PM ; [facility] offered active listening  
 and supportive feedback; normalized emotions and   
feelings, also provided pt time to process any current stressors. ~Promoted and   
encouraged follow-through with scheduling psychiatric services  
   
                                                    Last Documented On   
2 3:21PM ; [facility] provided supportive, empa  
thic listening and reflective feedback. ~Explored,   
encouraged, and supported the pt to discuss current sx/mood, assess risk for   
harm/need, coping mechanisms, support network and safety planning. ~Supported 
pt's   
plan to f/up with Dr. Alonso, as planned at [place], [place]. ~Encouraged 
pt to   
use safety plan, if needed to ensure she remains safe  
   
                                                    Last Documented On   
2 2:27PM ; [facility]  
   
                                                    Discussed nutritional needs   
teach healthy choices including fruits and   
vegetables  
   
                                                    Last Documented On   
2 3:20PM ; [facility]  
   
                                                    Patient education about a pr  
oper diet  
   
                                                    Last Documented On   
2 3:20PM ; [facility]  
   
                                                    Discussed concerns about exe  
rcise : promote physical activity ~ ~Will restart   
trazodone and prazosin ~ ~Patient is planning to have brother stay with her for 
a few   
days for emotional support ~ ~Follow up with PCP at next scheduled visit ~ ~Call
  
psychiatrist office to schedule appt ~ ~Call counselor  
   
                                                    Last Documented On   
2 9:35AM ; [facility]  
   
                                                    Provided supportive listenin  
g and empathic feedback; encouraged, explored, and   
supported the pt as she processed current symptoms, Issues, and concerns. 
~Discussed   
past tx and explored current needs/options. Acknowledged and validated pt's 
thoughts   
and emotions. ~Explored coping mechanisms and support network; utilized 
opportunity   
for safety planning; promoted seeking positive support and seeking help, as 
needed  
   
                                                    Last Documented On   
2 3:28PM ; [facility] provided active listenin  
g, support and helped pt process though current   
symptoms   
and stressor(s). Discussed and explored past effectiveness of medication; 
identified   
objectives and future goals; promoted use of healthy coping mechanisms, and 
self-care   
practices  
   
                                                    Last Documented On   
2 3:19PM ; [facility]  
   
                                                    Reviewed side effects and Ri  
sks/Benefits analysis  
   
                                                    Last Documented On   
2 3:19PM ; [facility]  
   
                                                    Discussed nutritional needs   
teach healthy choices including fruits and   
vegetables  
   
                                                    Last Documented On   
2 3:15PM ; [facility]  
   
                                                    Patient education about a pr  
oper diet  
   
                                                    Last Documented On   
2 3:15PM ; [facility]  
   
                                                    Discussed concerns about exe  
rcise : promote physical activity  
   
                                                    Last Documented On   
2 3:15PM ; [facility] introduced pt to [facility] in  
tegrated model of care ~[facility] offered active listening  
and   
supportive feedback; normalized emotions and feelings, also provided pt time to   
process any current stressors ~[facility] discussed potential benefits of counseling 
and   
supported re-engaging, as needed. ~[facility] encouraged pt to continue to make time to
  
implement self-care regimen and use coping methods, as needed  
   
                                                    Last Documented On   
2 4:07PM ; [facility]  
   
                                                    Discussed nutritional needs   
teach healthy choices including fruits and   
vegetables  
   
                                                    Last Documented On   
2 3:15PM ; [facility]  
   
                                                    Patient education about a pr  
oper diet  
   
                                                    Last Documented On   
2 3:15PM ; [facility]  
   
                                                    Inquiry and counseling about  
 medication administration and compliance  
   
                                                    Last Documented On   
2 7:37PM ; [facility]  
   
                                                    Discussed concerns about exe  
rcise : promote physical activity  
   
                                                    Last Documented On   
2 3:15PM ; [facility]  
   
                                                    Patient goals discussed  
   
                                                    Last Documented On   
2 7:37PM ; [facility]  
   
                                                    Ansewred pt's questions re B  
orderlline Personality D/O and Bipolar D/O raised by  
  
psychiatrist at [place]. ~Validated and normalized patient?s feelings while   
assisting to process recent events  
   
                                                    Last Documented On   
1 1:33AM ; [facility]  
   
                                                    Discussed nutritional needs   
teach healthy choices including fruits and   
vegetables  
   
                                                    Last Documented On   
1 2:04PM ; [facility]  
   
                                                    Patient education about a pr  
oper diet  
   
                                                    Last Documented On   
1 2:04PM ; [facility]  
   
                                                    Discussed concerns about exe  
rcise : promote physical activity  
   
                                                    Last Documented On   
1 2:04PM ; [facility] provided active listenin  
g, support and helped patient process through   
current   
symptoms and stressors with ongoing mental health concerns and medication 
changes.   
[facility] discussed coping skills and supports that patient is implementing.  
discussed   
implementing coping skills as discussed with counseling and attending weekly   
appointments as scheduled with counselor. Patient was encouraged to continue 
writing   
down concerns with medications and discuss with providers. [facility] reminded patient
of   
crisis resources should they be needed. Patient reports having crisis resources 
and   
could return to ER  
   
                                                    Last Documented On   
1 10:17AM ; [facility]  
   
                                                    Patient education about a pr  
oper diet  
   
                                                    Last Documented On   
1 5:17PM ; [facility]  
   
                                                    Patient education about meal  
 planning  
   
                                                    Last Documented On   
1 5:17PM ; [facility]  
   
                                                    Education about changing eat  
ing habits  
   
                                                    Last Documented On   
1 5:17PM ; [facility]  
   
                                                    Patient education about high  
 fiber diet  
   
                                                    Last Documented On   
1 5:17PM ; [facility]  
   
                                                    Patient education about low   
fat diet  
   
                                                    Last Documented On   
1 5:17PM ; [facility]  
   
                                                    Patient education about low   
cholesterol diet  
   
                                                    Last Documented On   
1 5:17PM ; [facility]  
   
                                                    Patient education about low   
carbohydrate diet  
   
                                                    Last Documented On   
1 5:17PM ; [facility]  
   
                                                    Patient education about high  
 protein diet  
   
                                                    Last Documented On   
1 5:17PM ; [facility] offered active and suppo  
rtive listening, normalized emotions and feelings,   
and   
processed current stressors. [facility] discussed resources for finding a counselor 
and   
provided list of local resources. [facility] discussed patients coping skills and 
supports   
and encouraged patient to continue to implement. [facility] reminded patient of crisis
  
resources should they be needed  
   
                                                    Last Documented On   
1 7:15PM ; [facility]  
   
                                                    Discussed nutritional needs   
teach healthy choices including fruits and   
vegetables  
   
                                                    Last Documented On   
1 11:38AM ; [facility]  
   
                                                    Patient education about a pr  
oper diet  
   
                                                    Last Documented On   
1 11:38AM ; [facility]  
   
                                                    Patient education about a pr  
oper diet  
   
                                                    Last Documented On   
1 12:19PM ; [facility]  
   
                                                    Patient education about meal  
 planning  
   
                                                    Last Documented On   
1 12:19PM ; [facility]  
   
                                                    Education about changing eat  
ing habits  
   
                                                    Last Documented On   
1 12:19PM ; [facility]  
   
                                                    Patient education about high  
 fiber diet  
   
                                                    Last Documented On   
1 12:19PM ; [facility]  
   
                                                    Patient education about low   
fat diet  
   
                                                    Last Documented On   
1 12:19PM ; [facility]  
   
                                                    Patient education about low   
cholesterol diet  
   
                                                    Last Documented On   
1 12:19PM ; [facility]  
   
                                                    Patient education about low   
carbohydrate diet  
   
                                                    Last Documented On   
1 12:19PM ; [facility]  
   
                                                    Patient education about high  
 protein diet  
   
                                                    Last Documented On   
1 12:19PM ; [facility]  
   
                                                    Discussed concerns about exe  
rcise : promote physical activity  
   
                                                    Last Documented On   
1 11:38AM ; [facility]  
g, support and helped patient process through   
current   
symptoms and stressors related to family conflict. ~P discussed coping skills 
and   
supports with patient that can be implemented and reminded patient of ways to 
access   
additional resources. ~P discussed crisis resources and plan. Patient has 
crisis   
resources still available should they be needed  
   
                                                    Last Documented On 06/10/202  
1 7:04PM ; [facility]  
   
                                                    Discussed nutritional needs   
teach healthy choices including fruits and   
vegetables  
   
                                                    Last Documented On 06/10/202  
1 3:57PM ; [facility]  
   
                                                    Patient education about a pr  
oper diet  
   
                                                    Last Documented On 06/10/202  
1 3:57PM ; [facility]  
   
                                                    Discussed concerns about exe  
rcise : promote physical activity  
   
                                                    Last Documented On 06/10/202  
1 3:57PM ; [facility] model of care. [facility] and PCP reassured   
patient of not sharing information with anyone unless she has signed a release 
for us   
to do so. ~P provided active listening, support and helped patient process 
through   
current symptoms and stressors. ~P discussed establishing counseling and   
psychiatry. [facility] discussed EMDR therapy and ways to find provider who does this 
type   
of therapy. ~P discussed crisis resources should mood worsen, [facility] provided 
text   
hotline number for crisis. [facility] reviewed crisis plan with patient and patient is 
able   
to contact positive supports and family when feeling down  
   
                                                    Last Documented On   
1 11:46AM ; [facility]  
   
                                                    Discussed nutritional needs   
teach healthy choices including fruits and   
vegetables  
   
                                                    Last Documented On   
1 2:11PM ; [facility]  
   
                                                    Patient education about a pr  
oper diet  
   
                                                    Last Documented On   
1 2:11PM ; [facility]  
   
                                                    Discussed concerns about exe  
rcise : promote physical activity  
   
                                                    Last Documented On   
1 2:11PM ; [facility]  
Work Phone: [phone]  
  
Includes: Instructions for all patient encounters  
  
  
  
                                                    Education and Decision Aids   
were provided during visit for:  
   
                                                    [facility] offered active and suppo  
rtive listening and processed current stressors   
related   
to getting medications. ~[facility] discussed coping skills and supports to implement 
in   
daily routine. ~[facility] discussed progress patient has felt they have made recently 
and   
encouraged continued follow-up with providers to address health  
   
                                                    Last Documented On   
4 5:16PM ; [facility]  
   
                                                    Discussed nutritional needs   
teach healthy choices including fruits and   
vegetables  
   
                                                    Last Documented On   
4 7:14PM ; [facility]  
   
                                                    Patient education about a pr  
oper diet  
   
                                                    Last Documented On   
4 7:14PM ; [facility]  
   
                                                    Discussed concerns about exe  
rcise : promote physical activity  
   
                                                    Last Documented On   
4 7:14PM ; [facility]  
   
                                                    Not requesting contraception  
   
                                                    Last Documented On   
4 7:14PM ; [facility]  
   
                                                    Discussed nutritional needs   
teach healthy choices including fruits and   
vegetables  
   
                                                    Last Documented On   
3 5:15PM ; [facility]  
   
                                                    Patient education about a pr  
oper diet  
   
                                                    Last Documented On   
3 5:15PM ; [facility]  
   
                                                    Discussed concerns about exe  
rcise : promote physical activity  
   
                                                    Last Documented On   
3 5:15PM ; [facility]  
   
                                                    Discussed nutritional needs   
teach healthy choices including fruits and   
vegetables  
   
                                                    Last Documented On 10/21/202  
2 1:42PM ; [facility]  
   
                                                    Patient education about a pr  
oper diet  
   
                                                    Last Documented On 10/21/202  
2 1:42PM ; [facility]  
   
                                                    Discussed concerns about exe  
rcise : promote physical activity  
   
                                                    Last Documented On 10/21/202  
2 1:42PM ; [facility] offered active listening  
 and supportive feedback; normalized emotions and   
feelings, also provided pt time to process any current stressors. ~Promoted and   
encouraged follow-through with scheduling psychiatric services  
   
                                                    Last Documented On   
2 3:21PM ; [facility] provided supportive, empa  
thic listening and reflective feedback. ~Explored,   
encouraged, and supported the pt to discuss current sx/mood, assess risk for   
harm/need, coping mechanisms, support network and safety planning. ~Supported 
pt's   
plan to f/up with Dr. Alonso, as planned at [place], [place]. ~Encouraged 
pt to   
use safety plan, if needed to ensure she remains safe  
   
                                                    Last Documented On   
2 2:27PM ; [facility]  
   
                                                    Discussed nutritional needs   
teach healthy choices including fruits and   
vegetables  
   
                                                    Last Documented On   
2 3:20PM ; [facility]  
   
                                                    Patient education about a pr  
oper diet  
   
                                                    Last Documented On   
2 3:20PM ; [facility]  
   
                                                    Discussed concerns about exe  
rcise : promote physical activity ~ ~Will restart   
trazodone and prazosin ~ ~Patient is planning to have brother stay with her for 
a few   
days for emotional support ~ ~Follow up with PCP at next scheduled visit ~ ~Call
  
psychiatrist office to schedule appt ~ ~Call counselor  
   
                                                    Last Documented On   
2 9:35AM ; [facility]  
   
                                                    Provided supportive listenin  
g and empathic feedback; encouraged, explored, and   
supported the pt as she processed current symptoms, Issues, and concerns. 
~Discussed   
past tx and explored current needs/options. Acknowledged and validated pt's 
thoughts   
and emotions. ~Explored coping mechanisms and support network; utilized 
opportunity   
for safety planning; promoted seeking positive support and seeking help, as 
needed  
   
                                                    Last Documented On   
2 3:28PM ; [facility] provided active listenin  
g, support and helped pt process though current   
symptoms   
and stressor(s). Discussed and explored past effectiveness of medication; 
identified   
objectives and future goals; promoted use of healthy coping mechanisms, and 
self-care   
practices  
   
                                                    Last Documented On   
2 3:19PM ; [facility]  
   
                                                    Reviewed side effects and Ri  
sks/Benefits analysis  
   
                                                    Last Documented On   
2 3:19PM ; [facility]  
   
                                                    Discussed nutritional needs   
teach healthy choices including fruits and   
vegetables  
   
                                                    Last Documented On   
2 3:15PM ; [facility]  
   
                                                    Patient education about a pr  
oper diet  
   
                                                    Last Documented On   
2 3:15PM ; [facility]  
   
                                                    Discussed concerns about exe  
rcise : promote physical activity  
   
                                                    Last Documented On   
2 3:15PM ; [facility] introduced pt to [facility] in  
tegrated model of care ~[facility] offered active listening  
and   
supportive feedback; normalized emotions and feelings, also provided pt time to   
process any current stressors ~[facility] discussed potential benefits of counseling 
and   
supported re-engaging, as needed. ~[facility] encouraged pt to continue to make time to
  
implement self-care regimen and use coping methods, as needed  
   
                                                    Last Documented On   
2 4:07PM ; [facility]  
   
                                                    Discussed nutritional needs   
teach healthy choices including fruits and   
vegetables  
   
                                                    Last Documented On   
2 3:15PM ; [facility]  
   
                                                    Patient education about a pr  
oper diet  
   
                                                    Last Documented On   
2 3:15PM ; [facility]  
   
                                                    Inquiry and counseling about  
 medication administration and compliance  
   
                                                    Last Documented On   
2 7:37PM ; [facility]  
   
                                                    Discussed concerns about exe  
rcise : promote physical activity  
   
                                                    Last Documented On   
2 3:15PM ; [facility]  
   
                                                    Patient goals discussed  
   
                                                    Last Documented On   
2 7:37PM ; [facility]  
   
                                                    Ansewred pt's questions re B  
orderlline Personality D/O and Bipolar D/O raised by  
  
psychiatrist at [place]. ~Validated and normalized patient?s feelings while   
assisting to process recent events  
   
                                                    Last Documented On   
1 1:33AM ; [facility]  
   
                                                    Discussed nutritional needs   
teach healthy choices including fruits and   
vegetables  
   
                                                    Last Documented On   
1 2:04PM ; [facility]  
   
                                                    Patient education about a pr  
oper diet  
   
                                                    Last Documented On   
1 2:04PM ; [facility]  
   
                                                    Discussed concerns about exe  
rcise : promote physical activity  
   
                                                    Last Documented On   
1 2:04PM ; [facility] provided active listenin  
g, support and helped patient process through   
current   
symptoms and stressors with ongoing mental health concerns and medication 
changes.   
[facility] discussed coping skills and supports that patient is implementing.  
discussed   
implementing coping skills as discussed with counseling and attending weekly   
appointments as scheduled with counselor. Patient was encouraged to continue 
writing   
down concerns with medications and discuss with providers. [facility] reminded patient
of   
crisis resources should they be needed. Patient reports having crisis resources 
and   
could return to ER  
   
                                                    Last Documented On   
1 10:17AM ; [facility]  
   
                                                    Patient education about a pr  
oper diet  
   
                                                    Last Documented On  5:17PM ; [facility]  
   
                                                    Patient education about meal  
 planning  
   
                                                    Last Documented On  5:17PM ; [facility]  
   
                                                    Education about changing eat  
ing habits  
   
                                                    Last Documented On  5:17PM ; [facility]  
   
                                                    Patient education about high  
 fiber diet  
   
                                                    Last Documented On  5:17PM ; [facility]  
   
                                                    Patient education about low   
fat diet  
   
                                                    Last Documented On  5:17PM ; [facility]  
   
                                                    Patient education about low   
cholesterol diet  
   
                                                    Last Documented On  5:17PM ; [facility]  
   
                                                    Patient education about low   
carbohydrate diet  
   
                                                    Last Documented On  5:17PM ; [facility]  
   
                                                    Patient education about high  
 protein diet  
   
                                                    Last Documented On  5:17PM ; [facility] offered active and suppo  
rtive listening, normalized emotions and feelings,   
and   
processed current stressors. [facility] discussed resources for finding a counselor 
and   
provided list of local resources. ~[facility] discussed patients coping skills and 
supports   
and encouraged patient to continue to implement. [facility] reminded patient of crisis
  
resources should they be needed  
   
                                                    Last Documented On   
1 7:15PM ; [facility]  
   
                                                    Discussed nutritional needs   
teach healthy choices including fruits and   
vegetables  
   
                                                    Last Documented On  11:38AM ; [facility]  
   
                                                    Patient education about a pr  
oper diet  
   
                                                    Last Documented On  11:38AM ; [facility]  
   
                                                    Patient education about a pr  
oper diet  
   
                                                    Last Documented On  12:19PM ; [facility]  
   
                                                    Patient education about meal  
 planning  
   
                                                    Last Documented On  12:19PM ; [facility]  
   
                                                    Education about changing eat  
ing habits  
   
                                                    Last Documented On   
1 12:19PM ; [facility]  
   
                                                    Patient education about high  
 fiber diet  
   
                                                    Last Documented On   
1 12:19PM ; [facility]  
   
                                                    Patient education about low   
fat diet  
   
                                                    Last Documented On   
1 12:19PM ; [facility]  
   
                                                    Patient education about low   
cholesterol diet  
   
                                                    Last Documented On   
1 12:19PM ; [facility]  
   
                                                    Patient education about low   
carbohydrate diet  
   
                                                    Last Documented On   
1 12:19PM ; [facility]  
   
                                                    Patient education about high  
 protein diet  
   
                                                    Last Documented On   
1 12:19PM ; [facility]  
   
                                                    Discussed concerns about exe  
rcise : promote physical activity  
   
                                                    Last Documented On   
1 11:38AM ; [facility] provided active listenin  
g, support and helped patient process through   
current   
symptoms and stressors related to family conflict. ~P discussed coping skills 
and   
supports with patient that can be implemented and reminded patient of ways to 
access   
additional resources. ~[facility] discussed crisis resources and plan. Patient has 
crisis   
resources still available should they be needed  
   
                                                    Last Documented On 06/10/202  
1 7:04PM ; [facility]  
   
                                                    Discussed nutritional needs   
teach healthy choices including fruits and   
vegetables  
   
                                                    Last Documented On 06/10/202  
1 3:57PM ; [facility]  
   
                                                    Patient education about a pr  
oper diet  
   
                                                    Last Documented On 06/10/202  
1 3:57PM ; [facility]  
   
                                                    Discussed concerns about exe  
rcise : promote physical activity  
   
                                                    Last Documented On 06/10/202  
1 3:57PM ; [facility]. P and PCP reassured   
patient of not sharing information with anyone unless she has signed a release 
for us   
to do so. ~P provided active listening, support and helped patient process 
through   
current symptoms and stressors. ~P discussed establishing counseling and   
psychiatry. P discussed EMDR therapy and ways to find provider who does this 
type   
of therapy. ~[facility] discussed crisis resources should mood worsen, P provided 
text   
hotline number for crisis. P reviewed crisis plan with patient and patient is 
able   
to contact positive supports and family when feeling down  
   
                                                    Last Documented On   
1 11:46AM ; [facility]  
   
                                                    Discussed nutritional needs   
teach healthy choices including fruits and   
vegetables  
   
                                                    Last Documented On   
1 2:11PM ; [facility]  
   
                                                    Patient education about a pr  
oper diet  
   
                                                    Last Documented On   
1 2:11PM ; [facility]  
   
                                                    Discussed concerns about exe  
rcise : promote physical activity  
   
                                                    Last Documented On   
1 2:11PM ; [facility]  
Work Phone: [phone]  
  
Includes: Instructions for all patient encounters  
  
  
  
                                                    Education and Decision Aids   
were provided during visit for:  
   
                                                    [facility] offered active and suppo  
rtive listening and processed current stressors   
related   
to getting medications. ~[facility] discussed coping skills and supports to implement 
in   
daily routine. ~P discussed progress patient has felt they have made recently 
and   
encouraged continued follow-up with providers to address health  
   
                                                    Last Documented On   
4 5:16PM ; [facility]  
   
                                                    Discussed nutritional needs   
teach healthy choices including fruits and   
vegetables  
   
                                                    Last Documented On   
4 7:14PM ; [facility]  
   
                                                    Patient education about a pr  
oper diet  
   
                                                    Last Documented On   
4 7:14PM ; [facility]  
   
                                                    Discussed concerns about exe  
rcise : promote physical activity  
   
                                                    Last Documented On   
4 7:14PM ; [facility]  
   
                                                    Not requesting contraception  
   
                                                    Last Documented On   
4 7:14PM ; [facility]  
   
                                                    Discussed nutritional needs   
teach healthy choices including fruits and   
vegetables  
   
                                                    Last Documented On   
3 5:15PM ; [facility]  
   
                                                    Patient education about a pr  
oper diet  
   
                                                    Last Documented On   
3 5:15PM ; [facility]  
   
                                                    Discussed concerns about exe  
rcise : promote physical activity  
   
                                                    Last Documented On   
3 5:15PM ; [facility]  
   
                                                    Discussed nutritional needs   
teach healthy choices including fruits and   
vegetables  
   
                                                    Last Documented On 10/21/202  
2 1:42PM ; [facility]  
   
                                                    Patient education about a pr  
oper diet  
   
                                                    Last Documented On 10/21/202  
2 1:42PM ; [facility]  
   
                                                    Discussed concerns about exe  
rcise : promote physical activity  
   
                                                    Last Documented On 10/21/202  
2 1:42PM ; [facility] offered active listening  
 and supportive feedback; normalized emotions and   
feelings, also provided pt time to process any current stressors. ~Promoted and   
encouraged follow-through with scheduling psychiatric services  
   
                                                    Last Documented On   
2 3:21PM ; [facility] provided supportive, empa  
thic listening and reflective feedback. ~Explored,   
encouraged, and supported the pt to discuss current sx/mood, assess risk for   
harm/need, coping mechanisms, support network and safety planning. ~Supported 
pt's   
plan to f/up with Dr. Alonso, as planned at [place], [place]. ~Encouraged 
pt to   
use safety plan, if needed to ensure she remains safe  
   
                                                    Last Documented On   
2 2:27PM ; [facility]  
   
                                                    Discussed nutritional needs   
teach healthy choices including fruits and   
vegetables  
   
                                                    Last Documented On   
2 3:20PM ; [facility]  
   
                                                    Patient education about a pr  
oper diet  
   
                                                    Last Documented On   
2 3:20PM ; [facility]  
   
                                                    Discussed concerns about exe  
rcise : promote physical activity ~ ~Will restart   
trazodone and prazosin ~ ~Patient is planning to have brother stay with her for 
a few   
days for emotional support ~ ~Follow up with PCP at next scheduled visit ~ ~Call
  
psychiatrist office to schedule appt ~ ~Call counselor  
   
                                                    Last Documented On   
2 9:35AM ; [facility]  
   
                                                    Provided supportive listenin  
g and empathic feedback; encouraged, explored, and   
supported the pt as she processed current symptoms, Issues, and concerns. 
~Discussed   
past tx and explored current needs/options. Acknowledged and validated pt's 
thoughts   
and emotions. ~Explored coping mechanisms and support network; utilized 
opportunity   
for safety planning; promoted seeking positive support and seeking help, as 
needed  
   
                                                    Last Documented On   
2 3:28PM ; [facility] provided active listenin  
g, support and helped pt process though current   
symptoms   
and stressor(s). Discussed and explored past effectiveness of medication; 
identified   
objectives and future goals; promoted use of healthy coping mechanisms, and 
self-care   
practices  
   
                                                    Last Documented On   
2 3:19PM ; [facility]  
   
                                                    Reviewed side effects and Ri  
sks/Benefits analysis  
   
                                                    Last Documented On   
2 3:19PM ; [facility]  
   
                                                    Discussed nutritional needs   
teach healthy choices including fruits and   
vegetables  
   
                                                    Last Documented On   
2 3:15PM ; [facility]  
   
                                                    Patient education about a pr  
oper diet  
   
                                                    Last Documented On   
2 3:15PM ; [facility]  
   
                                                    Discussed concerns about exe  
rcise : promote physical activity  
   
                                                    Last Documented On   
2 3:15PM ; [facility] in  
tegrated model of care ~P offered active listening  
and   
supportive feedback; normalized emotions and feelings, also provided pt time to   
process any current stressors ~P discussed potential benefits of counseling 
and   
supported re-engaging, as needed. ~P encouraged pt to continue to make time to
  
implement self-care regimen and use coping methods, as needed  
   
                                                    Last Documented On   
2 4:07PM ; [facility]  
   
                                                    Discussed nutritional needs   
teach healthy choices including fruits and   
vegetables  
   
                                                    Last Documented On   
2 3:15PM ; [facility]  
   
                                                    Patient education about a pr  
oper diet  
   
                                                    Last Documented On   
2 3:15PM ; [facility]  
   
                                                    Inquiry and counseling about  
 medication administration and compliance  
   
                                                    Last Documented On   
2 7:37PM ; [facility]  
   
                                                    Discussed concerns about exe  
rcise : promote physical activity  
   
                                                    Last Documented On   
2 3:15PM ; [facility]  
   
                                                    Patient goals discussed  
   
                                                    Last Documented On   
2 7:37PM ; [facility]  
   
                                                    Ansewred pt's questions re B  
orderlline Personality D/O and Bipolar D/O raised by  
  
psychiatrist at [place]. ~Validated and normalized patient?s feelings while   
assisting to process recent events  
   
                                                    Last Documented On   
1 1:33AM ; [facility]  
   
                                                    Discussed nutritional needs   
teach healthy choices including fruits and   
vegetables  
   
                                                    Last Documented On   
1 2:04PM ; [facility] [facility]  
   
                                                    Patient education about a pr  
oper diet  
   
                                                    Last Documented On   
1 2:04PM ; [facility]  
   
                                                    Discussed concerns about exe  
rcise : promote physical activity  
   
                                                    Last Documented On   
1 2:04PM ; [facility]  
g, support and helped patient process through   
current   
symptoms and stressors with ongoing mental health concerns and medication 
changes.   
~P discussed coping skills and supports that patient is implementing.  
discussed   
implementing coping skills as discussed with counseling and attending weekly   
appointments as scheduled with counselor. Patient was encouraged to continue 
writing   
down concerns with medications and discuss with providers. ~P reminded patient
of   
crisis resources should they be needed. Patient reports having crisis resources 
and   
could return to ER  
   
                                                    Last Documented On   
1 10:17AM ; [facility]  
   
                                                    Patient education about a pr  
oper diet  
   
                                                    Last Documented On  5:17PM ; [facility]  
   
                                                    Patient education about meal  
 planning  
   
                                                    Last Documented On  5:17PM ; [facility]  
   
                                                    Education about changing eat  
ing habits  
   
                                                    Last Documented On  5:17PM ; [facility]  
   
                                                    Patient education about high  
 fiber diet  
   
                                                    Last Documented On   
1 5:17PM ; [facility]  
   
                                                    Patient education about low   
fat diet  
   
                                                    Last Documented On  5:17PM ; [facility]  
   
                                                    Patient education about low   
cholesterol diet  
   
                                                    Last Documented On  5:17PM ; [facility]  
   
                                                    Patient education about low   
carbohydrate diet  
   
                                                    Last Documented On  5:17PM ; [facility]  
   
                                                    Patient education about high  
 protein diet  
   
                                                    Last Documented On  5:17PM ; [facility] offered active and suppo  
rtive listening, normalized emotions and feelings,   
and   
processed current stressors. ~[facility] discussed resources for finding a counselor 
and   
provided list of local resources. ~[facility] discussed patients coping skills and 
supports   
and encouraged patient to continue to implement. ~[facility] reminded patient of crisis
  
resources should they be needed  
   
                                                    Last Documented On   
1 7:15PM ; [facility]  
   
                                                    Discussed nutritional needs   
teach healthy choices including fruits and   
vegetables  
   
                                                    Last Documented On  11:38AM ; [facility]  
   
                                                    Patient education about a pr  
oper diet  
   
                                                    Last Documented On   
1 11:38AM ; [facility]  
   
                                                    Patient education about a pr  
oper diet  
   
                                                    Last Documented On   
1 12:19PM ; [facility]  
   
                                                    Patient education about meal  
 planning  
   
                                                    Last Documented On  12:19PM ; [facility]  
   
                                                    Education about changing eat  
ing habits  
   
                                                    Last Documented On  12:19PM ; [facility]  
   
                                                    Patient education about high  
 fiber diet  
   
                                                    Last Documented On  12:19PM ; [facility]  
   
                                                    Patient education about low   
fat diet  
   
                                                    Last Documented On  12:19PM ; [facility]  
   
                                                    Patient education about low   
cholesterol diet  
   
                                                    Last Documented On   
1 12:19PM ; [facility]  
   
                                                    Patient education about low   
carbohydrate diet  
   
                                                    Last Documented On   
1 12:19PM ; [facility]  
   
                                                    Patient education about high  
 protein diet  
   
                                                    Last Documented On   
1 12:19PM ; [facility]  
   
                                                    Discussed concerns about exe  
rcise : promote physical activity  
   
                                                    Last Documented On   
1 11:38AM ; [facility] provided active listenin  
g, support and helped patient process through   
current   
symptoms and stressors related to family conflict. ~[facility] discussed coping skills 
and   
supports with patient that can be implemented and reminded patient of ways to 
access   
additional resources. ~[facility] discussed crisis resources and plan. Patient has 
crisis   
resources still available should they be needed  
   
                                                    Last Documented On 06/10/202  
1 7:04PM ; [facility]  
   
                                                    Discussed nutritional needs   
teach healthy choices including fruits and   
vegetables  
   
                                                    Last Documented On 06/10/202  
1 3:57PM ; [facility]  
   
                                                    Patient education about a pr  
oper diet  
   
                                                    Last Documented On 06/10/202  
1 3:57PM ; [facility]  
   
                                                    Discussed concerns about exe  
rcise : promote physical activity  
   
                                                    Last Documented On 06/10/202  
1 3:57PM ; [facility] model of [facility]. P and PCP reassured   
patient of not sharing information with anyone unless she has signed a release 
for us   
to do so. ~[facility] provided active listening, support and helped patient process 
through   
current symptoms and stressors. ~[facility] discussed establishing counseling and   
psychiatry. [facility] discussed EMDR therapy and ways to find provider who does this 
type   
of therapy. ~[facility] discussed crisis resources should mood worsen, [facility] provided 
text   
hotline number for crisis. [facility] reviewed crisis plan with patient and patient is 
able   
to contact positive supports and family when feeling down  
   
                                                    Last Documented On   
1 11:46AM ; [facility]  
   
                                                    Discussed nutritional needs   
teach healthy choices including fruits and   
vegetables  
   
                                                    Last Documented On   
1 2:11PM ; [facility]  
   
                                                    Patient education about a pr  
oper diet  
   
                                                    Last Documented On   
1 2:11PM ; [facility]  
   
                                                    Discussed concerns about exe  
rcise : promote physical activity  
   
                                                    Last Documented On   
1 2:11PM ; [facility]  
Work Phone: [phone]  
  
Includes: Instructions for all patient encounters  
  
  
  
                                                    Education and Decision Aids   
were provided during visit for:  
   
                                                    [facility] offered active and suppo  
rtive listening and processed current stressors.   
~[facility]   
discussed coping skills and supports that can be implemented to manage increased
  
anxiety and stressors. P discussed potential of resuming counseling, even if 
for   
monthly visits to process ongoing stressors. ~[facility] encouraged patient to follow-
up on   
referrals as discussed with PCP  
   
                                                    Last Documented On   
4 10:08AM ; [facility]  
   
                                                    Discussed nutritional needs   
teach healthy choices including fruits and   
vegetables  
   
                                                    Last Documented On   
4 4:46PM ; [facility]  
   
                                                    Patient education about a pr  
oper diet  
   
                                                    Last Documented On   
4 4:46PM ; [facility]  
   
                                                    Discussed concerns about exe  
rcise : promote physical activity  
   
                                                    Last Documented On   
4 4:46PM ; [facility]  
   
                                                    Not requesting contraception  
   
                                                    Last Documented On   
4 4:46PM ; [facility] offered active and suppo  
rtive listening and processed current stressors   
related   
to getting medications. ~[facility] discussed coping skills and supports to implement 
in   
daily routine. ~[facility] discussed progress patient has felt they have made recently 
and   
encouraged continued follow-up with providers to address health  
   
                                                    Last Documented On   
4 5:16PM ; [facility]  
   
                                                    Discussed nutritional needs   
teach healthy choices including fruits and   
vegetables  
   
                                                    Last Documented On   
4 7:14PM ; [facility]  
   
                                                    Patient education about a pr  
oper diet  
   
                                                    Last Documented On   
4 7:14PM ; [facility]  
   
                                                    Discussed concerns about exe  
rcise : promote physical activity  
   
                                                    Last Documented On   
4 7:14PM ; [facility]  
   
                                                    Not requesting contraception  
   
                                                    Last Documented On   
4 7:14PM ; [facility]  
   
                                                    Discussed nutritional needs   
teach healthy choices including fruits and   
vegetables  
   
                                                    Last Documented On   
3 5:15PM ; [facility]  
   
                                                    Patient education about a pr  
oper diet  
   
                                                    Last Documented On   
3 5:15PM ; [facility]  
   
                                                    Discussed concerns about exe  
rcise : promote physical activity  
   
                                                    Last Documented On   
3 5:15PM ; [facility]  
   
                                                    Discussed nutritional needs   
teach healthy choices including fruits and   
vegetables  
   
                                                    Last Documented On 10/21/202  
2 1:42PM ; [facility]  
   
                                                    Patient education about a pr  
oper diet  
   
                                                    Last Documented On 10/21/202  
2 1:42PM ; [facility]  
   
                                                    Discussed concerns about exe  
rcise : promote physical activity  
   
                                                    Last Documented On 10/21/202  
2 1:42PM ; [facility] offered active listening  
 and supportive feedback; normalized emotions and   
feelings, also provided pt time to process any current stressors. ~Promoted and   
encouraged follow-through with scheduling psychiatric services  
   
                                                    Last Documented On   
2 3:21PM ; [facility] provided supportive, empa  
thic listening and reflective feedback. ~Explored,   
encouraged, and supported the pt to discuss current sx/mood, assess risk for   
harm/need, coping mechanisms, support network and safety planning. ~Supported 
pt's   
plan to f/up with Dr. Alonso, as planned at [place], [place]. ~Encouraged 
pt to   
use safety plan, if needed to ensure she remains safe  
   
                                                    Last Documented On   
2 2:27PM ; [facility]  
   
                                                    Discussed nutritional needs   
teach healthy choices including fruits and   
vegetables  
   
                                                    Last Documented On   
2 3:20PM ; [facility]  
   
                                                    Patient education about a pr  
oper diet  
   
                                                    Last Documented On   
2 3:20PM ; [facility]  
   
                                                    Discussed concerns about exe  
rcise : promote physical activity ~ ~Will restart   
trazodone and prazosin ~ ~Patient is planning to have brother stay with her for 
a few   
days for emotional support ~ ~Follow up with PCP at next scheduled visit ~ ~Call
  
psychiatrist office to schedule appt ~ ~Call counselor  
   
                                                    Last Documented On   
2 9:35AM ; [facility]  
   
                                                    Provided supportive listenin  
g and empathic feedback; encouraged, explored, and   
supported the pt as she processed current symptoms, Issues, and concerns. 
~Discussed   
past tx and explored current needs/options. Acknowledged and validated pt's 
thoughts   
and emotions. ~Explored coping mechanisms and support network; utilized 
opportunity   
for safety planning; promoted seeking positive support and seeking help, as 
needed  
   
                                                    Last Documented On   
2 3:28PM ; [facility] provided active listenin  
g, support and helped pt process though current   
symptoms   
and stressor(s). Discussed and explored past effectiveness of medication; 
identified   
objectives and future goals; promoted use of healthy coping mechanisms, and 
self-care   
practices  
   
                                                    Last Documented On   
2 3:19PM ; [facility]  
   
                                                    Reviewed side effects and Ri  
sks/Benefits analysis  
   
                                                    Last Documented On   
2 3:19PM ; [facility]  
   
                                                    Discussed nutritional needs   
teach healthy choices including fruits and   
vegetables  
   
                                                    Last Documented On   
2 3:15PM ; [facility]  
   
                                                    Patient education about a pr  
oper diet  
   
                                                    Last Documented On   
2 3:15PM ; [facility]  
   
                                                    Discussed concerns about exe  
rcise : promote physical activity  
   
                                                    Last Documented On   
2 3:15PM ; [facility] introduced pt to [facility] in  
tegrated model of care ~[facility] offered active listening  
and   
supportive feedback; normalized emotions and feelings, also provided pt time to   
process any current stressors ~[facility] discussed potential benefits of counseling 
and   
supported re-engaging, as needed. ~[facility] encouraged pt to continue to make time to
  
implement self-care regimen and use coping methods, as needed  
   
                                                    Last Documented On   
2 4:07PM ; [facility]  
   
                                                    Discussed nutritional needs   
teach healthy choices including fruits and   
vegetables  
   
                                                    Last Documented On   
2 3:15PM ; [facility]  
   
                                                    Patient education about a pr  
oper diet  
   
                                                    Last Documented On   
2 3:15PM ; [facility]  
   
                                                    Inquiry and counseling about  
 medication administration and compliance  
   
                                                    Last Documented On   
2 7:37PM ; [facility]  
   
                                                    Discussed concerns about exe  
rcise : promote physical activity  
   
                                                    Last Documented On   
2 3:15PM ; [facility]  
   
                                                    Patient goals discussed  
   
                                                    Last Documented On   
2 7:37PM ; [facility]  
   
                                                    Ansewred pt's questions re B  
orderlline Personality D/O and Bipolar D/O raised by  
  
psychiatrist at [place]. ~Validated and normalized patient?s feelings while   
assisting to process recent events  
   
                                                    Last Documented On   
1 1:33AM ; [facility]  
   
                                                    Discussed nutritional needs   
teach healthy choices including fruits and   
vegetables  
   
                                                    Last Documented On   
1 2:04PM ; [facility]  
   
                                                    Patient education about a pr  
oper diet  
   
                                                    Last Documented On   
1 2:04PM ; [facility]  
   
                                                    Discussed concerns about exe  
rcise : promote physical activity  
   
                                                    Last Documented On   
1 2:04PM ; [facility] provided active listenin  
g, support and helped patient process through   
current   
symptoms and stressors with ongoing mental health concerns and medication 
changes.   
[facility] discussed coping skills and supports that patient is implementing.  
discussed   
implementing coping skills as discussed with counseling and attending weekly   
appointments as scheduled with counselor. Patient was encouraged to continue 
writing   
down concerns with medications and discuss with providers. [facility] reminded patient
of   
crisis resources should they be needed. Patient reports having crisis resources 
and   
could return to ER  
   
                                                    Last Documented On   
1 10:17AM ; [facility]  
   
                                                    Patient education about a pr  
oper diet  
   
                                                    Last Documented On   
1 5:17PM ; [facility]  
   
                                                    Patient education about meal  
 planning  
   
                                                    Last Documented On  5:17PM ; [facility]  
   
                                                    Education about changing eat  
ing habits  
   
                                                    Last Documented On   
1 5:17PM ; [facility]  
   
                                                    Patient education about high  
 fiber diet  
   
                                                    Last Documented On   
1 5:17PM ; [facility]  
   
                                                    Patient education about low   
fat diet  
   
                                                    Last Documented On   
1 5:17PM ; [facility]  
   
                                                    Patient education about low   
cholesterol diet  
   
                                                    Last Documented On   
1 5:17PM ; [facility]  
   
                                                    Patient education about low   
carbohydrate diet  
   
                                                    Last Documented On   
1 5:17PM ; [facility]  
   
                                                    Patient education about high  
 protein diet  
   
                                                    Last Documented On   
1 5:17PM ; [facility] offered active and suppo  
rtive listening, normalized emotions and feelings,   
and   
processed current stressors. [facility] discussed resources for finding a counselor 
and   
provided list of local resources. [facility] discussed patients coping skills and 
supports   
and encouraged patient to continue to implement. [facility] reminded patient of crisis
  
resources should they be needed  
   
                                                    Last Documented On   
1 7:15PM ; [facility]  
   
                                                    Discussed nutritional needs   
teach healthy choices including fruits and   
vegetables  
   
                                                    Last Documented On   
1 11:38AM ; [facility]  
   
                                                    Patient education about a pr  
oper diet  
   
                                                    Last Documented On   
1 11:38AM ; [facility]  
   
                                                    Patient education about a pr  
oper diet  
   
                                                    Last Documented On   
1 12:19PM ; [facility]  
   
                                                    Patient education about meal  
 planning  
   
                                                    Last Documented On   
1 12:19PM ; [facility]  
   
                                                    Education about changing eat  
ing habits  
   
                                                    Last Documented On   
1 12:19PM ; [facility]  
   
                                                    Patient education about high  
 fiber diet  
   
                                                    Last Documented On   
1 12:19PM ; [facility]  
   
                                                    Patient education about low   
fat diet  
   
                                                    Last Documented On   
1 12:19PM ; [facility]  
   
                                                    Patient education about low   
cholesterol diet  
   
                                                    Last Documented On   
1 12:19PM ; [facility]  
   
                                                    Patient education about low   
carbohydrate diet  
   
                                                    Last Documented On   
1 12:19PM ; [facility]  
   
                                                    Patient education about high  
 protein diet  
   
                                                    Last Documented On   
1 12:19PM ; [facility]  
   
                                                    Discussed concerns about exe  
rcise : promote physical activity  
   
                                                    Last Documented On   
1 11:38AM ; [facility]  
g, support and helped patient process through   
current   
symptoms and stressors related to family conflict. ~P discussed coping skills 
and   
supports with patient that can be implemented and reminded patient of ways to 
access   
additional resources. ~P discussed crisis resources and plan. Patient has 
crisis   
resources still available should they be needed  
   
                                                    Last Documented On 06/10/202  
1 7:04PM ; [facility]  
   
                                                    Discussed nutritional needs   
teach healthy choices including fruits and   
vegetables  
   
                                                    Last Documented On 06/10/202  
1 3:57PM ; [facility]  
   
                                                    Patient education about a pr  
oper diet  
   
                                                    Last Documented On 06/10/202  
1 3:57PM ; [facility]  
   
                                                    Discussed concerns about exe  
rcise : promote physical activity  
   
                                                    Last Documented On 06/10/202  
1 3:57PM ; [facility] model of care. [facility] and PCP reassured   
patient of not sharing information with anyone unless she has signed a release 
for us   
to do so. ~P provided active listening, support and helped patient process 
through   
current symptoms and stressors. ~P discussed establishing counseling and   
psychiatry. [facility] discussed EMDR therapy and ways to find provider who does this 
type   
of therapy. ~P discussed crisis resources should mood worsen, [facility] provided 
text   
hotline number for crisis. [facility] reviewed crisis plan with patient and patient is 
able   
to contact positive supports and family when feeling down  
   
                                                    Last Documented On   
1 11:46AM ; [facility]  
   
                                                    Discussed nutritional needs   
teach healthy choices including fruits and   
vegetables  
   
                                                    Last Documented On   
1 2:11PM ; [facility]  
   
                                                    Patient education about a pr  
oper diet  
   
                                                    Last Documented On   
1 2:11PM ; [facility]  
   
                                                    Discussed concerns about exe  
rcise : promote physical activity  
   
                                                    Last Documented On   
1 2:11PM ; [facility]  
Work Phone: [phone]  
  
Includes: Instructions for all patient encounters  
  
  
  
                                                    Education and Decision Aids   
were provided during visit for:  
   
                                                    Counseling/education [Use fo  
r free text]  
   
                                                    Last Documented On   
4 6:27PM ; [facility]  
   
                                                    Discussed nutritional needs   
teach healthy choices including fruits and   
vegetables  
   
                                                    Last Documented On   
4 4:51PM ; [facility]  
   
                                                    Patient education about a pr  
oper diet  
   
                                                    Last Documented On   
4 4:51PM ; [facility]  
   
                                                    Discussed concerns about exe  
rcise : promote physical activity  
   
                                                    Last Documented On   
4 4:51PM ; [facility]  
   
                                                    Referred Patient to a [facility]  
es Self-Management Program  
   
                                                    Last Documented On   
4 5:22PM ; [facility] offered active and suppo  
rtive listening and processed current stressors.   
~[facility]   
discussed coping skills and supports that can be implemented to manage increased
  
anxiety and stressors. [facility] discussed potential of resuming counseling, even if 
for   
monthly visits to process ongoing stressors. ~[facility] encouraged patient to follow-
up on   
referrals as discussed with PCP  
   
                                                    Last Documented On   
4 10:08AM ; [facility]  
   
                                                    Discussed nutritional needs   
teach healthy choices including fruits and   
vegetables  
   
                                                    Last Documented On   
4 4:46PM ; [facility]  
   
                                                    Patient education about a pr  
oper diet  
   
                                                    Last Documented On   
4 4:46PM ; [facility]  
   
                                                    Discussed concerns about exe  
rcise : promote physical activity  
   
                                                    Last Documented On   
4 4:46PM ; [facility]  
   
                                                    Not requesting contraception  
   
                                                    Last Documented On   
4 4:46PM ; [facility] offered active and suppo  
rtive listening and processed current stressors   
related   
to getting medications. ~P discussed coping skills and supports to implement 
in   
daily routine. ~P discussed progress patient has felt they have made recently 
and   
encouraged continued follow-up with providers to address health  
   
                                                    Last Documented On   
4 5:16PM ; [facility]  
   
                                                    Discussed nutritional needs   
teach healthy choices including fruits and   
vegetables  
   
                                                    Last Documented On   
4 7:14PM ; [facility]  
   
                                                    Patient education about a pr  
oper diet  
   
                                                    Last Documented On   
4 7:14PM ; [facility]  
   
                                                    Discussed concerns about exe  
rcise : promote physical activity  
   
                                                    Last Documented On   
4 7:14PM ; [facility]  
   
                                                    Not requesting contraception  
   
                                                    Last Documented On   
4 7:14PM ; [facility]  
   
                                                    Discussed nutritional needs   
teach healthy choices including fruits and   
vegetables  
   
                                                    Last Documented On   
3 5:15PM ; [facility]  
   
                                                    Patient education about a pr  
oper diet  
   
                                                    Last Documented On   
3 5:15PM ; [facility]  
   
                                                    Discussed concerns about exe  
rcise : promote physical activity  
   
                                                    Last Documented On   
3 5:15PM ; [facility]  
   
                                                    Discussed nutritional needs   
teach healthy choices including fruits and   
vegetables  
   
                                                    Last Documented On 10/21/202  
2 1:42PM ; [facility]  
   
                                                    Patient education about a pr  
oper diet  
   
                                                    Last Documented On 10/21/202  
2 1:42PM ; [facility]  
   
                                                    Discussed concerns about exe  
rcise : promote physical activity  
   
                                                    Last Documented On 10/21/202  
2 1:42PM ; [facility] offered active listening  
 and supportive feedback; normalized emotions and   
feelings, also provided pt time to process any current stressors. ~Promoted and   
encouraged follow-through with scheduling psychiatric services  
   
                                                    Last Documented On   
2 3:21PM ; [facility] provided supportive, empa  
thic listening and reflective feedback. ~Explored,   
encouraged, and supported the pt to discuss current sx/mood, assess risk for   
harm/need, coping mechanisms, support network and safety planning. ~Supported 
pt's   
plan to f/up with Dr. Alonso, as planned at [place], [place]. ~Encouraged 
pt to   
use safety plan, if needed to ensure she remains safe  
   
                                                    Last Documented On   
2 2:27PM ; [facility]  
   
                                                    Discussed nutritional needs   
teach healthy choices including fruits and   
vegetables  
   
                                                    Last Documented On   
2 3:20PM ; [facility]  
   
                                                    Patient education about a pr  
oper diet  
   
                                                    Last Documented On   
2 3:20PM ; [facility]  
   
                                                    Discussed concerns about exe  
rcise : promote physical activity ~ ~Will restart   
trazodone and prazosin ~ ~Patient is planning to have brother stay with her for 
a few   
days for emotional support ~ ~Follow up with PCP at next scheduled visit ~ ~Call
  
psychiatrist office to schedule appt ~ ~Call counselor  
   
                                                    Last Documented On   
2 9:35AM ; [facility]  
   
                                                    Provided supportive listenin  
g and empathic feedback; encouraged, explored, and   
supported the pt as she processed current symptoms, Issues, and concerns. 
~Discussed   
past tx and explored current needs/options. Acknowledged and validated pt's 
thoughts   
and emotions. ~Explored coping mechanisms and support network; utilized 
opportunity   
for safety planning; promoted seeking positive support and seeking help, as 
needed  
   
                                                    Last Documented On   
2 3:28PM ; [facility] provided active listenin  
g, support and helped pt process though current   
symptoms   
and stressor(s). Discussed and explored past effectiveness of medication; 
identified   
objectives and future goals; promoted use of healthy coping mechanisms, and 
self-care   
practices  
   
                                                    Last Documented On   
2 3:19PM ; [facility]  
   
                                                    Reviewed side effects and Ri  
sks/Benefits analysis  
   
                                                    Last Documented On   
2 3:19PM ; [facility]  
   
                                                    Discussed nutritional needs   
teach healthy choices including fruits and   
vegetables  
   
                                                    Last Documented On   
2 3:15PM ; [facility]  
   
                                                    Patient education about a pr  
oper diet  
   
                                                    Last Documented On   
2 3:15PM ; [facility]  
   
                                                    Discussed concerns about exe  
rcise : promote physical activity  
   
                                                    Last Documented On   
2 3:15PM ; [facility] introduced pt to [facility] in  
tegrated model of care ~[facility] offered active listening  
and   
supportive feedback; normalized emotions and feelings, also provided pt time to   
process any current stressors ~[facility] discussed potential benefits of counseling 
and   
supported re-engaging, as needed. ~[facility] encouraged pt to continue to make time to
  
implement self-care regimen and use coping methods, as needed  
   
                                                    Last Documented On   
2 4:07PM ; [facility]  
   
                                                    Discussed nutritional needs   
teach healthy choices including fruits and   
vegetables  
   
                                                    Last Documented On   
2 3:15PM ; [facility]  
   
                                                    Patient education about a pr  
oper diet  
   
                                                    Last Documented On   
2 3:15PM ; [facility]  
   
                                                    Inquiry and counseling about  
 medication administration and compliance  
   
                                                    Last Documented On   
2 7:37PM ; [facility]  
   
                                                    Discussed concerns about exe  
rcise : promote physical activity  
   
                                                    Last Documented On   
2 3:15PM ; [facility]  
   
                                                    Patient goals discussed  
   
                                                    Last Documented On   
2 7:37PM ; [facility]  
   
                                                    Ansewred pt's questions re B  
orderlline Personality D/O and Bipolar D/O raised by  
  
psychiatrist at [place]. ~Validated and normalized patient?s feelings while   
assisting to process recent events  
   
                                                    Last Documented On   
1 1:33AM ; [facility]  
   
                                                    Discussed nutritional needs   
teach healthy choices including fruits and   
vegetables  
   
                                                    Last Documented On   
1 2:04PM ; [facility]  
   
                                                    Patient education about a pr  
oper diet  
   
                                                    Last Documented On   
1 2:04PM ; [facility]  
   
                                                    Discussed concerns about exe  
rcise : promote physical activity  
   
                                                    Last Documented On   
1 2:04PM ; [facility] provided active listenin  
g, support and helped patient process through   
current   
symptoms and stressors with ongoing mental health concerns and medication 
changes.   
[facility] discussed coping skills and supports that patient is implementing.  
discussed   
implementing coping skills as discussed with counseling and attending weekly   
appointments as scheduled with counselor. Patient was encouraged to continue 
writing   
down concerns with medications and discuss with providers. [facility] reminded patient
of   
crisis resources should they be needed. Patient reports having crisis resources 
and   
could return to ER  
   
                                                    Last Documented On   
1 10:17AM ; [facility]  
   
                                                    Patient education about a pr  
oper diet  
   
                                                    Last Documented On   
1 5:17PM ; [facility]  
   
                                                    Patient education about meal  
 planning  
   
                                                    Last Documented On   
1 5:17PM ; [facility]  
   
                                                    Education about changing eat  
ing habits  
   
                                                    Last Documented On  5:17PM ; [facility]  
   
                                                    Patient education about high  
 fiber diet  
   
                                                    Last Documented On  5:17PM ; [facility]  
   
                                                    Patient education about low   
fat diet  
   
                                                    Last Documented On  5:17PM ; [facility]  
   
                                                    Patient education about low   
cholesterol diet  
   
                                                    Last Documented On  5:17PM ; [facility]  
   
                                                    Patient education about low   
carbohydrate diet  
   
                                                    Last Documented On  5:17PM ; [facility]  
   
                                                    Patient education about high  
 protein diet  
   
                                                    Last Documented On  5:17PM ; [facility] offered active and suppo  
rtive listening, normalized emotions and feelings,   
and   
processed current stressors. ~[facility] discussed resources for finding a counselor 
and   
provided list of local resources. ~[facility] discussed patients coping skills and 
supports   
and encouraged patient to continue to implement. ~[facility] reminded patient of crisis
  
resources should they be needed  
   
                                                    Last Documented On  7:15PM ; [facility]  
   
                                                    Discussed nutritional needs   
teach healthy choices including fruits and   
vegetables  
   
                                                    Last Documented On  11:38AM ; [facility]  
   
                                                    Patient education about a pr  
oper diet  
   
                                                    Last Documented On  11:38AM ; [facility]  
   
                                                    Patient education about a pr  
oper diet  
   
                                                    Last Documented On  12:19PM ; [facility]  
   
                                                    Patient education about meal  
 planning  
   
                                                    Last Documented On  12:19PM ; [facility]  
   
                                                    Education about changing eat  
ing habits  
   
                                                    Last Documented On  12:19PM ; [facility]  
   
                                                    Patient education about high  
 fiber diet  
   
                                                    Last Documented On  12:19PM ; [facility]  
   
                                                    Patient education about low   
fat diet  
   
                                                    Last Documented On  12:19PM ; [facility]  
   
                                                    Patient education about low   
cholesterol diet  
   
                                                    Last Documented On  12:19PM ; [facility]  
   
                                                    Patient education about low   
carbohydrate diet  
   
                                                    Last Documented On  12:19PM ; [facility]  
   
                                                    Patient education about high  
 protein diet  
   
                                                    Last Documented On  12:19PM ; [facility]  
   
                                                    Discussed concerns about exe  
rcise : promote physical activity  
   
                                                    Last Documented On   
1 11:38AM ; [facility] provided active listenin  
g, support and helped patient process through   
current   
symptoms and stressors related to family conflict. ~P discussed coping skills 
and   
supports with patient that can be implemented and reminded patient of ways to 
access   
additional resources. ~P discussed crisis resources and plan. Patient has 
crisis   
resources still available should they be needed  
   
                                                    Last Documented On 06/10/202  
1 7:04PM ; [facility]  
   
                                                    Discussed nutritional needs   
teach healthy choices including fruits and   
vegetables  
   
                                                    Last Documented On 06/10/202  
1 3:57PM ; [facility]  
   
                                                    Patient education about a pr  
oper diet  
   
                                                    Last Documented On 06/10/202  
1 3:57PM ; [facility]  
   
                                                    Discussed concerns about exe  
rcise : promote physical activity  
   
                                                    Last Documented On 06/10/202  
1 3:57PM ; [facility] model of [facility]. [facility] and PCP reassured   
patient of not sharing information with anyone unless she has signed a release 
for us   
to do so. ~[facility] provided active listening, support and helped patient process 
through   
current symptoms and stressors. ~P discussed establishing counseling and   
psychiatry. P discussed EMDR therapy and ways to find provider who does this 
type   
of therapy. ~[facility] discussed crisis resources should mood worsen, [facility] provided 
text   
hotline number for crisis. [facility] reviewed crisis plan with patient and patient is 
able   
to contact positive supports and family when feeling down  
   
                                                    Last Documented On   
1 11:46AM ; [facility]  
   
                                                    Discussed nutritional needs   
teach healthy choices including fruits and   
vegetables  
   
                                                    Last Documented On   
1 2:11PM ; [facility]  
   
                                                    Patient education about a pr  
oper diet  
   
                                                    Last Documented On   
1 2:11PM ; [facility]  
   
                                                    Discussed concerns about exe  
rcise : promote physical activity  
   
                                                    Last Documented On   
1 2:11PM ; [facility]  
Work Phone: [phone]  
  
Includes: Instructions for all patient encounters  
  
  
  
                                                    Education and Decision Aids   
were provided during visit for:  
   
                                                    [facility] offered active and suppo  
rtive listening and processed recent stressors. ~P  
  
discussed coping skills and supports to implement in managing increased anxiety 
such   
as tools learned previously in therapy. ~P discussed sleep hygiene strategies 
that   
can be implemented. ~[facility] encouraged patient to follow-up with specialists as   
scheduled  
   
                                                    Last Documented On   
4 3:26PM ; [facility]  
   
                                                    Discussed nutritional needs   
teach healthy choices including fruits and   
vegetables  
   
                                                    Last Documented On   
4 4:51PM ; [facility]  
   
                                                    Patient education about a pr  
oper diet  
   
                                                    Last Documented On   
4 4:51PM ; [facility]  
   
                                                    Discussed concerns about exe  
rcise : promote physical activity  
   
                                                    Last Documented On   
4 4:51PM ; [facility]  
   
                                                    Referred Patient to a [facility]  
es Self-Management Program  
   
                                                    Last Documented On   
4 5:22PM ; [facility] active and suppo  
rtive listening and processed current stressors.   
~P   
discussed coping skills and supports that can be implemented to manage increased
  
anxiety and stressors. [facility] discussed potential of resuming counseling, even if 
for   
monthly visits to process ongoing stressors. ~P encouraged patient to follow-
up on   
referrals as discussed with PCP  
   
                                                    Last Documented On   
4 10:08AM ; [facility]  
   
                                                    Discussed nutritional needs   
teach healthy choices including fruits and   
vegetables  
   
                                                    Last Documented On   
4 4:46PM ; [facility]  
   
                                                    Patient education about a pr  
oper diet  
   
                                                    Last Documented On   
4 4:46PM ; [facility]  
   
                                                    Discussed concerns about exe  
rcise : promote physical activity  
   
                                                    Last Documented On   
4 4:46PM ; [facility]  
   
                                                    Not requesting contraception  
   
                                                    Last Documented On   
4 4:46PM ; [facility] offered active and suppo  
rtive listening and processed current stressors   
related   
to getting medications. ~P discussed coping skills and supports to implement 
in   
daily routine. ~P discussed progress patient has felt they have made recently 
and   
encouraged continued follow-up with providers to address health  
   
                                                    Last Documented On   
4 5:16PM ; [facility]  
   
                                                    Discussed nutritional needs   
teach healthy choices including fruits and   
vegetables  
   
                                                    Last Documented On   
4 7:14PM ; [facility]  
   
                                                    Patient education about a pr  
oper diet  
   
                                                    Last Documented On   
4 7:14PM ; [facility]  
   
                                                    Discussed concerns about exe  
rcise : promote physical activity  
   
                                                    Last Documented On   
4 7:14PM ; [facility]  
   
                                                    Not requesting contraception  
   
                                                    Last Documented On   
4 7:14PM ; [facility]  
   
                                                    Discussed nutritional needs   
teach healthy choices including fruits and   
vegetables  
   
                                                    Last Documented On   
3 5:15PM ; [facility]  
   
                                                    Patient education about a pr  
oper diet  
   
                                                    Last Documented On   
3 5:15PM ; [facility]  
   
                                                    Discussed concerns about exe  
rcise : promote physical activity  
   
                                                    Last Documented On   
3 5:15PM ; [facility]  
   
                                                    Discussed nutritional needs   
teach healthy choices including fruits and   
vegetables  
   
                                                    Last Documented On 10/21/202  
2 1:42PM ; [facility]  
   
                                                    Patient education about a pr  
oper diet  
   
                                                    Last Documented On 10/21/202  
2 1:42PM ; [facility]  
   
                                                    Discussed concerns about exe  
rcise : promote physical activity  
   
                                                    Last Documented On 10/21/202  
2 1:42PM ; [facility] offered active listening  
 and supportive feedback; normalized emotions and   
feelings, also provided pt time to process any current stressors. ~Promoted and   
encouraged follow-through with scheduling psychiatric services  
   
                                                    Last Documented On   
2 3:21PM ; [facility] provided supportive, empa  
thic listening and reflective feedback. ~Explored,   
encouraged, and supported the pt to discuss current sx/mood, assess risk for   
harm/need, coping mechanisms, support network and safety planning. ~Supported 
pt's   
plan to f/up with Dr. Alonso, as planned at [place], [place]. ~Encouraged 
pt to   
use safety plan, if needed to ensure she remains safe  
   
                                                    Last Documented On   
2 2:27PM ; [facility]  
   
                                                    Discussed nutritional needs   
teach healthy choices including fruits and   
vegetables  
   
                                                    Last Documented On   
2 3:20PM ; [facility]  
   
                                                    Patient education about a pr  
oper diet  
   
                                                    Last Documented On   
2 3:20PM ; [facility]  
   
                                                    Discussed concerns about exe  
rcise : promote physical activity ~ ~Will restart   
trazodone and prazosin ~ ~Patient is planning to have brother stay with her for 
a few   
days for emotional support ~ ~Follow up with PCP at next scheduled visit ~ ~Call
  
psychiatrist office to schedule appt ~ ~Call counselor  
   
                                                    Last Documented On   
2 9:35AM ; [facility]  
   
                                                    Provided supportive listenin  
g and empathic feedback; encouraged, explored, and   
supported the pt as she processed current symptoms, Issues, and concerns. 
~Discussed   
past tx and explored current needs/options. Acknowledged and validated pt's 
thoughts   
and emotions. ~Explored coping mechanisms and support network; utilized 
opportunity   
for safety planning; promoted seeking positive support and seeking help, as 
needed  
   
                                                    Last Documented On   
2 3:28PM ; [facility] provided active listenin  
g, support and helped pt process though current   
symptoms   
and stressor(s). Discussed and explored past effectiveness of medication; 
identified   
objectives and future goals; promoted use of healthy coping mechanisms, and 
self-care   
practices  
   
                                                    Last Documented On   
2 3:19PM ; [facility]  
   
                                                    Reviewed side effects and Ri  
sks/Benefits analysis  
   
                                                    Last Documented On   
2 3:19PM ; [facility]  
   
                                                    Discussed nutritional needs   
teach healthy choices including fruits and   
vegetables  
   
                                                    Last Documented On   
2 3:15PM ; [facility]  
   
                                                    Patient education about a pr  
oper diet  
   
                                                    Last Documented On   
2 3:15PM ; [facility]  
   
                                                    Discussed concerns about exe  
rcise : promote physical activity  
   
                                                    Last Documented On   
2 3:15PM ; [facility] introduced pt to [facility] in  
tegrated model of care ~[facility] offered active listening  
and   
supportive feedback; normalized emotions and feelings, also provided pt time to   
process any current stressors ~[facility] discussed potential benefits of counseling 
and   
supported re-engaging, as needed. ~[facility] encouraged pt to continue to make time to
  
implement self-care regimen and use coping methods, as needed  
   
                                                    Last Documented On   
2 4:07PM ; [facility]  
   
                                                    Discussed nutritional needs   
teach healthy choices including fruits and   
vegetables  
   
                                                    Last Documented On   
2 3:15PM ; [facility]  
   
                                                    Patient education about a pr  
oper diet  
   
                                                    Last Documented On   
2 3:15PM ; [facility]  
   
                                                    Inquiry and counseling about  
 medication administration and compliance  
   
                                                    Last Documented On   
2 7:37PM ; [facility]  
   
                                                    Discussed concerns about exe  
rcise : promote physical activity  
   
                                                    Last Documented On   
2 3:15PM ; [facility]  
   
                                                    Patient goals discussed  
   
                                                    Last Documented On   
2 7:37PM ; [facility]  
   
                                                    Ansewred pt's questions re B  
orderlline Personality D/O and Bipolar D/O raised by  
  
psychiatrist at [place]. ~Validated and normalized patient?s feelings while   
assisting to process recent events  
   
                                                    Last Documented On   
1 1:33AM ; [facility]  
   
                                                    Discussed nutritional needs   
teach healthy choices including fruits and   
vegetables  
   
                                                    Last Documented On   
1 2:04PM ; [facility]  
   
                                                    Patient education about a pr  
oper diet  
   
                                                    Last Documented On   
1 2:04PM ; [facility]  
   
                                                    Discussed concerns about exe  
rcise : promote physical activity  
   
                                                    Last Documented On   
1 2:04PM ; [facility] provided active listenin  
g, support and helped patient process through   
current   
symptoms and stressors with ongoing mental health concerns and medication 
changes.   
~P discussed coping skills and supports that patient is implementing.  
discussed   
implementing coping skills as discussed with counseling and attending weekly   
appointments as scheduled with counselor. Patient was encouraged to continue 
writing   
down concerns with medications and discuss with providers. ~[facility] reminded patient
of   
crisis resources should they be needed. Patient reports having crisis resources 
and   
could return to ER  
   
                                                    Last Documented On   
1 10:17AM ; [facility]  
   
                                                    Patient education about a pr  
oper diet  
   
                                                    Last Documented On   
1 5:17PM ; [facility]  
   
                                                    Patient education about meal  
 planning  
   
                                                    Last Documented On   
1 5:17PM ; [facility]  
   
                                                    Education about changing eat  
ing habits  
   
                                                    Last Documented On   
1 5:17PM ; [facility]  
   
                                                    Patient education about high  
 fiber diet  
   
                                                    Last Documented On   
1 5:17PM ; [facility]  
   
                                                    Patient education about low   
fat diet  
   
                                                    Last Documented On   
1 5:17PM ; [facility]  
   
                                                    Patient education about low   
cholesterol diet  
   
                                                    Last Documented On   
1 5:17PM ; [facility]  
   
                                                    Patient education about low   
carbohydrate diet  
   
                                                    Last Documented On   
1 5:17PM ; [facility]  
   
                                                    Patient education about high  
 protein diet  
   
                                                    Last Documented On   
1 5:17PM ; [facility] offered active and suppo  
rtive listening, normalized emotions and feelings,   
and   
processed current stressors. ~[facility] discussed resources for finding a counselor 
and   
provided list of local resources. ~[facility] discussed patients coping skills and 
supports   
and encouraged patient to continue to implement. [facility] reminded patient of crisis
  
resources should they be needed  
   
                                                    Last Documented On   
1 7:15PM ; [facility]  
   
                                                    Discussed nutritional needs   
teach healthy choices including fruits and   
vegetables  
   
                                                    Last Documented On   
1 11:38AM ; [facility]  
   
                                                    Patient education about a pr  
oper diet  
   
                                                    Last Documented On   
1 11:38AM ; [facility]  
   
                                                    Patient education about a pr  
oper diet  
   
                                                    Last Documented On   
1 12:19PM ; [facility]  
   
                                                    Patient education about meal  
 planning  
   
                                                    Last Documented On   
1 12:19PM ; [facility]  
   
                                                    Education about changing eat  
ing habits  
   
                                                    Last Documented On   
1 12:19PM ; [facility]  
   
                                                    Patient education about high  
 fiber diet  
   
                                                    Last Documented On   
1 12:19PM ; [facility]  
   
                                                    Patient education about low   
fat diet  
   
                                                    Last Documented On   
1 12:19PM ; [facility]  
   
                                                    Patient education about low   
cholesterol diet  
   
                                                    Last Documented On   
1 12:19PM ; [facility]  
   
                                                    Patient education about low   
carbohydrate diet  
   
                                                    Last Documented On   
1 12:19PM ; [facility]  
   
                                                    Patient education about high  
 protein diet  
   
                                                    Last Documented On   
1 12:19PM ; [facility]  
   
                                                    Discussed concerns about exe  
rcise : promote physical activity  
   
                                                    Last Documented On   
1 11:38AM ; [facility] provided active listenin  
g, support and helped patient process through   
current   
symptoms and stressors related to family conflict. [facility] discussed coping skills 
and   
supports with patient that can be implemented and reminded patient of ways to 
access   
additional resources. ~[facility] discussed crisis resources and plan. Patient has 
crisis   
resources still available should they be needed  
   
                                                    Last Documented On 06/10/202  
1 7:04PM ; [facility]  
   
                                                    Discussed nutritional needs   
teach healthy choices including fruits and   
vegetables  
   
                                                    Last Documented On 06/10/202  
1 3:57PM ; [facility]  
   
                                                    Patient education about a pr  
oper diet  
   
                                                    Last Documented On 06/10/202  
1 3:57PM ; [facility]  
   
                                                    Discussed concerns about exe  
rcise : promote physical activity  
   
                                                    Last Documented On 06/10/202  
1 3:57PM ; [facility] introduced patient to [facility] integrated model of care. [facility] and PCP reassured   
patient of not sharing information with anyone unless she has signed a release 
for us   
to do so. ~P provided active listening, support and helped patient process 
through   
current symptoms and stressors. ~P discussed establishing counseling and   
psychiatry. [facility] discussed EMDR therapy and ways to find provider who does this 
type   
of therapy. ~[facility] discussed crisis resources should mood worsen, [facility] provided 
text   
hotline number for crisis. [facility] reviewed crisis plan with patient and patient is 
able   
to contact positive supports and family when feeling down  
   
                                                    Last Documented On   
1 11:46AM ; [facility]  
   
                                                    Discussed nutritional needs   
teach healthy choices including fruits and   
vegetables  
   
                                                    Last Documented On   
1 2:11PM ; [facility]  
   
                                                    Patient education about a pr  
oper diet  
   
                                                    Last Documented On   
1 2:11PM ; [facility]  
   
                                                    Discussed concerns about exe  
rcise : promote physical activity  
   
                                                    Last Documented On   
1 2:11PM ; [facility]  
Work Phone: [phone]  
  
Includes: Instructions for all patient encounters  
  
  
  
                                                    Education and Decision Aids   
were provided during visit for:  
   
                                                    ~*[facility] offered active and sup  
portive listening, normalized emotions and feelings,  
and   
processed ~current stressors. ~*Discussed engageing in counseling and looking 
into   
EMDR to address PTSD and increased nightmares  
   
                                                    Last Documented On   
4 4:14PM ; [facility]  
   
                                                    Discussed nutritional needs   
teach healthy choices including fruits and   
vegetables  
   
                                                    Last Documented On   
4 4:33PM ; [facility]  
   
                                                    Patient education about a pr  
oper diet  
   
                                                    Last Documented On   
4 4:33PM ; [facility]  
   
                                                    Discussed concerns about exe  
rcise : promote physical activity  
   
                                                    Last Documented On   
4 4:33PM ; [facility] offered active and suppo  
rtive listening and processed recent stressors. ~P  
  
discussed coping skills and supports to implement in managing increased anxiety 
such   
as tools learned previously in therapy. ~P discussed sleep hygiene strategies 
that   
can be implemented. ~[facility] encouraged patient to follow-up with specialists as   
scheduled  
   
                                                    Last Documented On   
4 3:26PM ; [facility]  
   
                                                    Discussed nutritional needs   
teach healthy choices including fruits and   
vegetables  
   
                                                    Last Documented On   
4 4:51PM ; [facility]  
   
                                                    Patient education about a pr  
oper diet  
   
                                                    Last Documented On   
4 4:51PM ; [facility]  
   
                                                    Discussed concerns about exe  
rcise : promote physical activity  
   
                                                    Last Documented On   
4 4:51PM ; [facility]  
   
                                                    Referred Patient to a [facility]  
es Self-Management Program  
   
                                                    Last Documented On   
4 5:22PM ; [facility]o  
rtive listening and processed current stressors.   
~P   
discussed coping skills and supports that can be implemented to manage increased
  
anxiety and stressors. P discussed potential of resuming counseling, even if 
for   
monthly visits to process ongoing stressors. ~[facility] encouraged patient to follow-
up on   
referrals as discussed with PCP  
   
                                                    Last Documented On   
4 10:08AM ; [facility]  
   
                                                    Discussed nutritional needs   
teach healthy choices including fruits and   
vegetables  
   
                                                    Last Documented On   
4 4:46PM ; [facility]  
   
                                                    Patient education about a pr  
oper diet  
   
                                                    Last Documented On   
4 4:46PM ; [facility]  
   
                                                    Discussed concerns about exe  
rcise : promote physical activity  
   
                                                    Last Documented On   
4 4:46PM ; [facility]  
   
                                                    Not requesting contraception  
   
                                                    Last Documented On   
4 4:46PM ; [facility] offered active and suppo  
rtive listening and processed current stressors   
related   
to getting medications. ~[facility] discussed coping skills and supports to implement 
in   
daily routine. ~[facility] discussed progress patient has felt they have made recently 
and   
encouraged continued follow-up with providers to address health  
   
                                                    Last Documented On   
4 5:16PM ; [facility]  
   
                                                    Discussed nutritional needs   
teach healthy choices including fruits and   
vegetables  
   
                                                    Last Documented On   
4 7:14PM ; [facility]  
   
                                                    Patient education about a pr  
oper diet  
   
                                                    Last Documented On   
4 7:14PM ; [facility]  
   
                                                    Discussed concerns about exe  
rcise : promote physical activity  
   
                                                    Last Documented On   
4 7:14PM ; [facility]  
   
                                                    Not requesting contraception  
   
                                                    Last Documented On   
4 7:14PM ; [facility]  
   
                                                    Discussed nutritional needs   
teach healthy choices including fruits and   
vegetables  
   
                                                    Last Documented On   
3 5:15PM ; [facility]  
   
                                                    Patient education about a pr  
oper diet  
   
                                                    Last Documented On   
3 5:15PM ; [facility]  
   
                                                    Discussed concerns about exe  
rcise : promote physical activity  
   
                                                    Last Documented On   
3 5:15PM ; [facility]  
   
                                                    Discussed nutritional needs   
teach healthy choices including fruits and   
vegetables  
   
                                                    Last Documented On 10/21/202  
2 1:42PM ; [facility]  
   
                                                    Patient education about a pr  
oper diet  
   
                                                    Last Documented On 10/21/202  
2 1:42PM ; [facility]  
   
                                                    Discussed concerns about exe  
rcise : promote physical activity  
   
                                                    Last Documented On 10/21/202  
2 1:42PM ; [facility]  
 and supportive feedback; normalized emotions and   
feelings, also provided pt time to process any current stressors. ~Promoted and   
encouraged follow-through with scheduling psychiatric services  
   
                                                    Last Documented On   
2 3:21PM ; [facility] provided supportive, empa  
thic listening and reflective feedback. ~Explored,   
encouraged, and supported the pt to discuss current sx/mood, assess risk for   
harm/need, coping mechanisms, support network and safety planning. ~Supported 
pt's   
plan to f/up with Dr. Alonso, as planned at [place], [place]. ~Encouraged 
pt to   
use safety plan, if needed to ensure she remains safe  
   
                                                    Last Documented On   
2 2:27PM ; [facility]  
   
                                                    Discussed nutritional needs   
teach healthy choices including fruits and   
vegetables  
   
                                                    Last Documented On   
2 3:20PM ; [facility]  
   
                                                    Patient education about a pr  
oper diet  
   
                                                    Last Documented On   
2 3:20PM ; [facility]  
   
                                                    Discussed concerns about exe  
rcise : promote physical activity ~ ~Will restart   
trazodone and prazosin ~ ~Patient is planning to have brother stay with her for 
a few   
days for emotional support ~ ~Follow up with PCP at next scheduled visit ~ ~Call
  
psychiatrist office to schedule appt ~ ~Call counselor  
   
                                                    Last Documented On   
2 9:35AM ; [facility]  
   
                                                    Provided supportive listenin  
g and empathic feedback; encouraged, explored, and   
supported the pt as she processed current symptoms, Issues, and concerns. 
~Discussed   
past tx and explored current needs/options. Acknowledged and validated pt's 
thoughts   
and emotions. ~Explored coping mechanisms and support network; utilized 
opportunity   
for safety planning; promoted seeking positive support and seeking help, as 
needed  
   
                                                    Last Documented On   
2 3:28PM ; [facility] provided active listenin  
g, support and helped pt process though current   
symptoms   
and stressor(s). Discussed and explored past effectiveness of medication; 
identified   
objectives and future goals; promoted use of healthy coping mechanisms, and 
self-care   
practices  
   
                                                    Last Documented On   
2 3:19PM ; [facility]  
   
                                                    Reviewed side effects and Ri  
sks/Benefits analysis  
   
                                                    Last Documented On   
2 3:19PM ; [facility]  
   
                                                    Discussed nutritional needs   
teach healthy choices including fruits and   
vegetables  
   
                                                    Last Documented On   
2 3:15PM ; [facility]  
   
                                                    Patient education about a pr  
oper diet  
   
                                                    Last Documented On   
2 3:15PM ; [facility]  
   
                                                    Discussed concerns about exe  
rcise : promote physical activity  
   
                                                    Last Documented On   
2 3:15PM ; [facility] introduced pt to [facility] in  
tegrated model of care ~[facility] offered active listening  
and   
supportive feedback; normalized emotions and feelings, also provided pt time to   
process any current stressors ~[facility] discussed potential benefits of counseling 
and   
supported re-engaging, as needed. ~[facility] encouraged pt to continue to make time to
  
implement self-care regimen and use coping methods, as needed  
   
                                                    Last Documented On   
2 4:07PM ; [facility]  
   
                                                    Discussed nutritional needs   
teach healthy choices including fruits and   
vegetables  
   
                                                    Last Documented On   
2 3:15PM ; [facility]  
   
                                                    Patient education about a pr  
oper diet  
   
                                                    Last Documented On   
2 3:15PM ; [facility]  
   
                                                    Inquiry and counseling about  
 medication administration and compliance  
   
                                                    Last Documented On   
2 7:37PM ; [facility]  
   
                                                    Discussed concerns about exe  
rcise : promote physical activity  
   
                                                    Last Documented On   
2 3:15PM ; [facility]  
   
                                                    Patient goals discussed  
   
                                                    Last Documented On   
2 7:37PM ; [facility]  
   
                                                    Ansewred pt's questions re B  
orderlline Personality D/O and Bipolar D/O raised by  
  
psychiatrist at [place]. ~Validated and normalized patient?s feelings while   
assisting to process recent events  
   
                                                    Last Documented On   
1 1:33AM ; [facility]  
   
                                                    Discussed nutritional needs   
teach healthy choices including fruits and   
vegetables  
   
                                                    Last Documented On   
1 2:04PM ; [facility]  
   
                                                    Patient education about a pr  
oper diet  
   
                                                    Last Documented On   
1 2:04PM ; [facility]  
   
                                                    Discussed concerns about exe  
rcise : promote physical activity  
   
                                                    Last Documented On   
1 2:04PM ; [facility] provided active listenin  
g, support and helped patient process through   
current   
symptoms and stressors with ongoing mental health concerns and medication 
changes.   
~P discussed coping skills and supports that patient is implementing.  
discussed   
implementing coping skills as discussed with counseling and attending weekly   
appointments as scheduled with counselor. Patient was encouraged to continue 
writing   
down concerns with medications and discuss with providers. ~P reminded patient
of   
crisis resources should they be needed. Patient reports having crisis resources 
and   
could return to ER  
   
                                                    Last Documented On   
1 10:17AM ; [facility]  
   
                                                    Patient education about a pr  
oper diet  
   
                                                    Last Documented On   
1 5:17PM ; [facility]  
   
                                                    Patient education about meal  
 planning  
   
                                                    Last Documented On  5:17PM ; [facility]  
   
                                                    Education about changing eat  
ing habits  
   
                                                    Last Documented On   
1 5:17PM ; [facility]  
   
                                                    Patient education about high  
 fiber diet  
   
                                                    Last Documented On   
1 5:17PM ; [facility]  
   
                                                    Patient education about low   
fat diet  
   
                                                    Last Documented On   
1 5:17PM ; [facility]  
   
                                                    Patient education about low   
cholesterol diet  
   
                                                    Last Documented On   
1 5:17PM ; [facility]  
   
                                                    Patient education about low   
carbohydrate diet  
   
                                                    Last Documented On   
1 5:17PM ; [facility]  
   
                                                    Patient education about high  
 protein diet  
   
                                                    Last Documented On   
1 5:17PM ; [facility] offered active and suppo  
rtive listening, normalized emotions and feelings,   
and   
processed current stressors. ~P discussed resources for finding a counselor 
and   
provided list of local resources. ~P discussed patients coping skills and 
supports   
and encouraged patient to continue to implement. ~[facility] reminded patient of crisis
  
resources should they be needed  
   
                                                    Last Documented On   
1 7:15PM ; [facility]  
   
                                                    Discussed nutritional needs   
teach healthy choices including fruits and   
vegetables  
   
                                                    Last Documented On   
1 11:38AM ; [facility]  
   
                                                    Patient education about a pr  
oper diet  
   
                                                    Last Documented On   
1 11:38AM ; [facility]  
   
                                                    Patient education about a pr  
oper diet  
   
                                                    Last Documented On   
1 12:19PM ; [facility]  
   
                                                    Patient education about meal  
 planning  
   
                                                    Last Documented On   
1 12:19PM ; [facility]  
   
                                                    Education about changing eat  
ing habits  
   
                                                    Last Documented On   
1 12:19PM ; [facility]  
   
                                                    Patient education about high  
 fiber diet  
   
                                                    Last Documented On   
1 12:19PM ; [facility]  
   
                                                    Patient education about low   
fat diet  
   
                                                    Last Documented On   
1 12:19PM ; [facility]  
   
                                                    Patient education about low   
cholesterol diet  
   
                                                    Last Documented On   
1 12:19PM ; [facility]  
   
                                                    Patient education about low   
carbohydrate diet  
   
                                                    Last Documented On   
1 12:19PM ; [facility]  
   
                                                    Patient education about high  
 protein diet  
   
                                                    Last Documented On   
1 12:19PM ; [facility]  
   
                                                    Discussed concerns about exe  
rcise : promote physical activity  
   
                                                    Last Documented On   
1 11:38AM ; [facility] provided active listenin  
g, support and helped patient process through   
current   
symptoms and stressors related to family conflict. ~[facility] discussed coping skills 
and   
supports with patient that can be implemented and reminded patient of ways to 
access   
additional resources. ~[facility] discussed crisis resources and plan. Patient has 
crisis   
resources still available should they be needed  
   
                                                    Last Documented On 06/10/202  
1 7:04PM ; [facility]  
   
                                                    Discussed nutritional needs   
teach healthy choices including fruits and   
vegetables  
   
                                                    Last Documented On 06/10/202  
1 3:57PM ; [facility]  
   
                                                    Patient education about a pr  
oper diet  
   
                                                    Last Documented On 06/10/202  
1 3:57PM ; [facility]  
   
                                                    Discussed concerns about exe  
rcise : promote physical activity  
   
                                                    Last Documented On 06/10/202  
1 3:57PM ; [facility]. [facility] and PCP reassured   
patient of not sharing information with anyone unless she has signed a release 
for us   
to do so. ~P provided active listening, support and helped patient process 
through   
current symptoms and stressors. ~P discussed establishing counseling and   
psychiatry. [facility] discussed EMDR therapy and ways to find provider who does this 
type   
of therapy. ~P discussed crisis resources should mood worsen, [facility] provided 
text   
hotline number for crisis. P reviewed crisis plan with patient and patient is 
able   
to contact positive supports and family when feeling down  
   
                                                    Last Documented On   
1 11:46AM ; [facility]  
   
                                                    Discussed nutritional needs   
teach healthy choices including fruits and   
vegetables  
   
                                                    Last Documented On   
1 2:11PM ; [facility]  
   
                                                    Patient education about a pr  
oper diet  
   
                                                    Last Documented On   
1 2:11PM ; [facility]  
   
                                                    Discussed concerns about exe  
rcise : promote physical activity  
   
                                                    Last Documented On   
1 2:11PM ; [facility]  
Work Phone: [phone]  
  
Includes: Instructions for all patient encounters  
  
  
  
                                                    Education and Decision Aids   
were provided during visit for:  
   
                                                    ~*[facility] offered active and sup  
portive listening, normalized emotions and feelings,  
and   
processed ~current stressors. ~*Discussed engageing in counseling and looking 
into   
EMDR to address PTSD and increased nightmares  
   
                                                    Last Documented On   
4 4:14PM ; [facility]  
   
                                                    Discussed nutritional needs   
teach healthy choices including fruits and   
vegetables  
   
                                                    Last Documented On   
4 4:33PM ; [facility]  
   
                                                    Patient education about a pr  
oper diet  
   
                                                    Last Documented On   
4 4:33PM ; [facility]  
   
                                                    Discussed concerns about exe  
rcise : promote physical activity  
   
                                                    Last Documented On   
4 4:33PM ; [facility] offered active and suppo  
rtive listening and processed recent stressors. ~P  
  
discussed coping skills and supports to implement in managing increased anxiety 
such   
as tools learned previously in therapy. ~P discussed sleep hygiene strategies 
that   
can be implemented. ~[facility] encouraged patient to follow-up with specialists as   
scheduled  
   
                                                    Last Documented On   
4 3:26PM ; [facility]  
   
                                                    Discussed nutritional needs   
teach healthy choices including fruits and   
vegetables  
   
                                                    Last Documented On   
4 4:51PM ; [facility]  
   
                                                    Patient education about a pr  
oper diet  
   
                                                    Last Documented On   
4 4:51PM ; [facility]  
   
                                                    Discussed concerns about exe  
rcise : promote physical activity  
   
                                                    Last Documented On   
4 4:51PM ; [facility]  
   
                                                    Referred Patient to a [facility]  
es Self-Management Program  
   
                                                    Last Documented On   
4 5:22PM ; [facility] offered active and suppo  
rtive listening and processed current stressors.   
~P   
discussed coping skills and supports that can be implemented to manage increased
  
anxiety and stressors. P discussed potential of resuming counseling, even if 
for   
monthly visits to process ongoing stressors. ~[facility] encouraged patient to follow-
up on   
referrals as discussed with PCP  
   
                                                    Last Documented On   
4 10:08AM ; [facility]  
   
                                                    Discussed nutritional needs   
teach healthy choices including fruits and   
vegetables  
   
                                                    Last Documented On   
4 4:46PM ; [facility]  
   
                                                    Patient education about a pr  
oper diet  
   
                                                    Last Documented On   
4 4:46PM ; [facility]  
   
                                                    Discussed concerns about exe  
rcise : promote physical activity  
   
                                                    Last Documented On   
4 4:46PM ; [facility]  
   
                                                    Not requesting contraception  
   
                                                    Last Documented On   
4 4:46PM ; [facility] offered active and suppo  
rtive listening and processed current stressors   
related   
to getting medications. ~P discussed coping skills and supports to implement 
in   
daily routine. ~[facility] discussed progress patient has felt they have made recently 
and   
encouraged continued follow-up with providers to address health  
   
                                                    Last Documented On   
4 5:16PM ; [facility]  
   
                                                    Discussed nutritional needs   
teach healthy choices including fruits and   
vegetables  
   
                                                    Last Documented On   
4 7:14PM ; [facility]  
   
                                                    Patient education about a pr  
oper diet  
   
                                                    Last Documented On   
4 7:14PM ; [facility]  
   
                                                    Discussed concerns about exe  
rcise : promote physical activity  
   
                                                    Last Documented On   
4 7:14PM ; [facility]  
   
                                                    Not requesting contraception  
   
                                                    Last Documented On   
4 7:14PM ; [facility]  
   
                                                    Discussed nutritional needs   
teach healthy choices including fruits and   
vegetables  
   
                                                    Last Documented On   
3 5:15PM ; [facility]  
   
                                                    Patient education about a pr  
oper diet  
   
                                                    Last Documented On   
3 5:15PM ; [facility]  
   
                                                    Discussed concerns about exe  
rcise : promote physical activity  
   
                                                    Last Documented On   
3 5:15PM ; [facility]  
   
                                                    Discussed nutritional needs   
teach healthy choices including fruits and   
vegetables  
   
                                                    Last Documented On 10/21/202  
2 1:42PM ; [facility]  
   
                                                    Patient education about a pr  
oper diet  
   
                                                    Last Documented On 10/21/202  
2 1:42PM ; [facility]  
   
                                                    Discussed concerns about exe  
rcise : promote physical activity  
   
                                                    Last Documented On 10/21/202  
2 1:42PM ; [facility]  
 and supportive feedback; normalized emotions and   
feelings, also provided pt time to process any current stressors. ~Promoted and   
encouraged follow-through with scheduling psychiatric services  
   
                                                    Last Documented On   
2 3:21PM ; [facility] provided supportive, empa  
thic listening and reflective feedback. ~Explored,   
encouraged, and supported the pt to discuss current sx/mood, assess risk for   
harm/need, coping mechanisms, support network and safety planning. ~Supported 
pt's   
plan to f/up with Dr. Alonso, as planned at [place], [place]. ~Encouraged 
pt to   
use safety plan, if needed to ensure she remains safe  
   
                                                    Last Documented On   
2 2:27PM ; [facility]  
   
                                                    Discussed nutritional needs   
teach healthy choices including fruits and   
vegetables  
   
                                                    Last Documented On   
2 3:20PM ; [facility]  
   
                                                    Patient education about a pr  
oper diet  
   
                                                    Last Documented On   
2 3:20PM ; [facility]  
   
                                                    Discussed concerns about exe  
rcise : promote physical activity ~ ~Will restart   
trazodone and prazosin ~ ~Patient is planning to have brother stay with her for 
a few   
days for emotional support ~ ~Follow up with PCP at next scheduled visit ~ ~Call
  
psychiatrist office to schedule appt ~ ~Call counselor  
   
                                                    Last Documented On   
2 9:35AM ; [facility]  
   
                                                    Provided supportive listenin  
g and empathic feedback; encouraged, explored, and   
supported the pt as she processed current symptoms, Issues, and concerns. 
~Discussed   
past tx and explored current needs/options. Acknowledged and validated pt's 
thoughts   
and emotions. ~Explored coping mechanisms and support network; utilized 
opportunity   
for safety planning; promoted seeking positive support and seeking help, as 
needed  
   
                                                    Last Documented On   
2 3:28PM ; [facility] provided active listenin  
g, support and helped pt process though current   
symptoms   
and stressor(s). Discussed and explored past effectiveness of medication; 
identified   
objectives and future goals; promoted use of healthy coping mechanisms, and 
self-care   
practices  
   
                                                    Last Documented On   
2 3:19PM ; [facility]  
   
                                                    Reviewed side effects and Ri  
sks/Benefits analysis  
   
                                                    Last Documented On   
2 3:19PM ; [facility]  
   
                                                    Discussed nutritional needs   
teach healthy choices including fruits and   
vegetables  
   
                                                    Last Documented On   
2 3:15PM ; [facility]  
   
                                                    Patient education about a pr  
oper diet  
   
                                                    Last Documented On   
2 3:15PM ; [facility]  
   
                                                    Discussed concerns about exe  
rcise : promote physical activity  
   
                                                    Last Documented On   
2 3:15PM ; [facility] introduced pt to [facility] in  
tegrated model of care ~[facility] offered active listening  
and   
supportive feedback; normalized emotions and feelings, also provided pt time to   
process any current stressors ~[facility] discussed potential benefits of counseling 
and   
supported re-engaging, as needed. ~[facility] encouraged pt to continue to make time to
  
implement self-care regimen and use coping methods, as needed  
   
                                                    Last Documented On   
2 4:07PM ; [facility]  
   
                                                    Discussed nutritional needs   
teach healthy choices including fruits and   
vegetables  
   
                                                    Last Documented On   
2 3:15PM ; [facility]  
   
                                                    Patient education about a pr  
oper diet  
   
                                                    Last Documented On   
2 3:15PM ; [facility]  
   
                                                    Inquiry and counseling about  
 medication administration and compliance  
   
                                                    Last Documented On   
2 7:37PM ; [facility]  
   
                                                    Discussed concerns about exe  
rcise : promote physical activity  
   
                                                    Last Documented On   
2 3:15PM ; [facility]  
   
                                                    Patient goals discussed  
   
                                                    Last Documented On   
2 7:37PM ; [facility]  
   
                                                    Ansewred pt's questions re B  
orderlline Personality D/O and Bipolar D/O raised by  
  
psychiatrist at [place]. ~Validated and normalized patient?s feelings while   
assisting to process recent events  
   
                                                    Last Documented On   
1 1:33AM ; [facility]  
   
                                                    Discussed nutritional needs   
teach healthy choices including fruits and   
vegetables  
   
                                                    Last Documented On   
1 2:04PM ; [facility]  
   
                                                    Patient education about a pr  
oper diet  
   
                                                    Last Documented On   
1 2:04PM ; [facility]  
   
                                                    Discussed concerns about exe  
rcise : promote physical activity  
   
                                                    Last Documented On   
1 2:04PM ; [facility] provided active listenin  
g, support and helped patient process through   
current   
symptoms and stressors with ongoing mental health concerns and medication 
changes.   
~[facility] discussed coping skills and supports that patient is implementing.  
discussed   
implementing coping skills as discussed with counseling and attending weekly   
appointments as scheduled with counselor. Patient was encouraged to continue 
writing   
down concerns with medications and discuss with providers. ~[facility] reminded patient
of   
crisis resources should they be needed. Patient reports having crisis resources 
and   
could return to ER  
   
                                                    Last Documented On   
1 10:17AM ; [facility]  
   
                                                    Patient education about a pr  
oper diet  
   
                                                    Last Documented On   
1 5:17PM ; [facility]  
   
                                                    Patient education about meal  
 planning  
   
                                                    Last Documented On   
1 5:17PM ; [facility]  
   
                                                    Education about changing eat  
ing habits  
   
                                                    Last Documented On   
1 5:17PM ; [facility]  
   
                                                    Patient education about high  
 fiber diet  
   
                                                    Last Documented On   
1 5:17PM ; [facility]  
   
                                                    Patient education about low   
fat diet  
   
                                                    Last Documented On   
1 5:17PM ; [facility]  
   
                                                    Patient education about low   
cholesterol diet  
   
                                                    Last Documented On   
1 5:17PM ; [facility]  
   
                                                    Patient education about low   
carbohydrate diet  
   
                                                    Last Documented On   
1 5:17PM ; [facility]  
   
                                                    Patient education about high  
 protein diet  
   
                                                    Last Documented On   
1 5:17PM ; [facility] offered active and suppo  
rtive listening, normalized emotions and feelings,   
and   
processed current stressors. ~[facility] discussed resources for finding a counselor 
and   
provided list of local resources. ~[facility] discussed patients coping skills and 
supports   
and encouraged patient to continue to implement. ~[facility] reminded patient of crisis
  
resources should they be needed  
   
                                                    Last Documented On   
1 7:15PM ; [facility]  
   
                                                    Discussed nutritional needs   
teach healthy choices including fruits and   
vegetables  
   
                                                    Last Documented On   
1 11:38AM ; [facility]  
   
                                                    Patient education about a pr  
oper diet  
   
                                                    Last Documented On   
1 11:38AM ; [facility]  
   
                                                    Patient education about a pr  
oper diet  
   
                                                    Last Documented On   
1 12:19PM ; [facility]  
   
                                                    Patient education about meal  
 planning  
   
                                                    Last Documented On  12:19PM ; [facility]  
   
                                                    Education about changing eat  
ing habits  
   
                                                    Last Documented On   
1 12:19PM ; [facility]  
   
                                                    Patient education about high  
 fiber diet  
   
                                                    Last Documented On   
1 12:19PM ; [facility]  
   
                                                    Patient education about low   
fat diet  
   
                                                    Last Documented On  12:19PM ; [facility]  
   
                                                    Patient education about low   
cholesterol diet  
   
                                                    Last Documented On  12:19PM ; [facility]  
   
                                                    Patient education about low   
carbohydrate diet  
   
                                                    Last Documented On  12:19PM ; [facility]  
   
                                                    Patient education about high  
 protein diet  
   
                                                    Last Documented On  12:19PM ; [facility]  
   
                                                    Discussed concerns about exe  
rcise : promote physical activity  
   
                                                    Last Documented On   
1 11:38AM ; [facility]  
g, support and helped patient process through   
current   
symptoms and stressors related to family conflict. ~P discussed coping skills 
and   
supports with patient that can be implemented and reminded patient of ways to 
access   
additional resources. ~[facility] discussed crisis resources and plan. Patient has 
crisis   
resources still available should they be needed  
   
                                                    Last Documented On 06/10/202  
1 7:04PM ; [facility]  
   
                                                    Discussed nutritional needs   
teach healthy choices including fruits and   
vegetables  
   
                                                    Last Documented On 06/10/202  
1 3:57PM ; [facility]  
   
                                                    Patient education about a pr  
oper diet  
   
                                                    Last Documented On 06/10/202  
1 3:57PM ; [facility]  
   
                                                    Discussed concerns about exe  
rcise : promote physical activity  
   
                                                    Last Documented On 06/10/202  
1 3:57PM ; [facility] model of care. [facility] and PCP reassured   
patient of not sharing information with anyone unless she has signed a release 
for us   
to do so. ~[facility] provided active listening, support and helped patient process 
through   
current symptoms and stressors. ~[facility] discussed establishing counseling and   
psychiatry. P discussed EMDR therapy and ways to find provider who does this 
type   
of therapy. ~[facility] discussed crisis resources should mood worsen, [facility] provided 
text   
hotline number for crisis. P reviewed crisis plan with patient and patient is 
able   
to contact positive supports and family when feeling down  
   
                                                    Last Documented On   
1 11:46AM ; [facility]  
   
                                                    Discussed nutritional needs   
teach healthy choices including fruits and   
vegetables  
   
                                                    Last Documented On   
1 2:11PM ; [facility]  
   
                                                    Patient education about a pr  
oper diet  
   
                                                    Last Documented On   
1 2:11PM ; [facility]  
   
                                                    Discussed concerns about exe  
rcise : promote physical activity  
   
                                                    Last Documented On   
1 2:11PM ; [facility]  
Work Phone: [phone]  
  
Includes: Instructions for all patient encounters  
  
  
  
                                                    Education and Decision Aids   
were provided during visit for:  
   
                                                    ~*[facility] offered active and sup  
portive listening, normalized emotions and feelings,  
and   
processed ~current stressors. ~*Discussed engageing in counseling and looking 
into   
EMDR to address PTSD and increased nightmares  
   
                                                    Last Documented On   
4 4:14PM ; [facility]  
   
                                                    Discussed nutritional needs   
teach healthy choices including fruits and   
vegetables  
   
                                                    Last Documented On   
4 4:33PM ; [facility]  
   
                                                    Patient education about a pr  
oper diet  
   
                                                    Last Documented On   
4 4:33PM ; [facility]  
   
                                                    Discussed concerns about exe  
rcise : promote physical activity  
   
                                                    Last Documented On   
4 4:33PM ; [facility] offered active and suppo  
rtive listening and processed recent stressors. ~[facility]  
  
discussed coping skills and supports to implement in managing increased anxiety 
such   
as tools learned previously in therapy. ~[facility] discussed sleep hygiene strategies 
that   
can be implemented. ~[facility] encouraged patient to follow-up with specialists as   
scheduled  
   
                                                    Last Documented On   
4 3:26PM ; [facility]  
   
                                                    Discussed nutritional needs   
teach healthy choices including fruits and   
vegetables  
   
                                                    Last Documented On   
4 4:51PM ; [facility]  
   
                                                    Patient education about a pr  
oper diet  
   
                                                    Last Documented On   
4 4:51PM ; [facility]  
   
                                                    Discussed concerns about exe  
rcise : promote physical activity  
   
                                                    Last Documented On   
4 4:51PM ; [facility]  
   
                                                    Referred Patient to a [facility]  
es Self-Management Program  
   
                                                    Last Documented On   
4 5:22PM ; [facility] offered active and suppo  
rtive listening and processed current stressors.   
~P   
discussed coping skills and supports that can be implemented to manage increased
  
anxiety and stressors. P discussed potential of resuming counseling, even if 
for   
monthly visits to process ongoing stressors. ~[facility] encouraged patient to follow-
up on   
referrals as discussed with PCP  
   
                                                    Last Documented On   
4 10:08AM ; [facility]  
   
                                                    Discussed nutritional needs   
teach healthy choices including fruits and   
vegetables  
   
                                                    Last Documented On   
4 4:46PM ; [facility]  
   
                                                    Patient education about a pr  
oper diet  
   
                                                    Last Documented On   
4 4:46PM ; [facility]  
   
                                                    Discussed concerns about exe  
rcise : promote physical activity  
   
                                                    Last Documented On   
4 4:46PM ; [facility]  
   
                                                    Not requesting contraception  
   
                                                    Last Documented On   
4 4:46PM ; [facility] offered active and suppo  
rtive listening and processed current stressors   
related   
to getting medications. ~[facility] discussed coping skills and supports to implement 
in   
daily routine. ~[facility] discussed progress patient has felt they have made recently 
and   
encouraged continued follow-up with providers to address health  
   
                                                    Last Documented On   
4 5:16PM ; [facility]  
   
                                                    Discussed nutritional needs   
teach healthy choices including fruits and   
vegetables  
   
                                                    Last Documented On   
4 7:14PM ; [facility]  
   
                                                    Patient education about a pr  
oper diet  
   
                                                    Last Documented On   
4 7:14PM ; [facility]  
   
                                                    Discussed concerns about exe  
rcise : promote physical activity  
   
                                                    Last Documented On   
4 7:14PM ; [facility]  
   
                                                    Not requesting contraception  
   
                                                    Last Documented On   
4 7:14PM ; [facility]  
   
                                                    Discussed nutritional needs   
teach healthy choices including fruits and   
vegetables  
   
                                                    Last Documented On   
3 5:15PM ; [facility]  
   
                                                    Patient education about a pr  
oper diet  
   
                                                    Last Documented On   
3 5:15PM ; [facility]  
   
                                                    Discussed concerns about exe  
rcise : promote physical activity  
   
                                                    Last Documented On   
3 5:15PM ; [facility]  
   
                                                    Discussed nutritional needs   
teach healthy choices including fruits and   
vegetables  
   
                                                    Last Documented On 10/21/202  
2 1:42PM ; [facility]  
   
                                                    Patient education about a pr  
oper diet  
   
                                                    Last Documented On 10/21/202  
2 1:42PM ; [facility]  
   
                                                    Discussed concerns about exe  
rcise : promote physical activity  
   
                                                    Last Documented On 10/21/202  
2 1:42PM ; [facility] offered active listening  
 and supportive feedback; normalized emotions and   
feelings, also provided pt time to process any current stressors. ~Promoted and   
encouraged follow-through with scheduling psychiatric services  
   
                                                    Last Documented On   
2 3:21PM ; [facility] provided supportive, empa  
thic listening and reflective feedback. ~Explored,   
encouraged, and supported the pt to discuss current sx/mood, assess risk for   
harm/need, coping mechanisms, support network and safety planning. ~Supported 
pt's   
plan to f/up with Dr. Alonso, as planned at [place], [place]. ~Encouraged 
pt to   
use safety plan, if needed to ensure she remains safe  
   
                                                    Last Documented On   
2 2:27PM ; [facility]  
   
                                                    Discussed nutritional needs   
teach healthy choices including fruits and   
vegetables  
   
                                                    Last Documented On   
2 3:20PM ; [facility]  
   
                                                    Patient education about a pr  
oper diet  
   
                                                    Last Documented On   
2 3:20PM ; [facility]  
   
                                                    Discussed concerns about exe  
rcise : promote physical activity ~ ~Will restart   
trazodone and prazosin ~ ~Patient is planning to have brother stay with her for 
a few   
days for emotional support ~ ~Follow up with PCP at next scheduled visit ~ ~Call
  
psychiatrist office to schedule appt ~ ~Call counselor  
   
                                                    Last Documented On   
2 9:35AM ; [facility]  
   
                                                    Provided supportive listenin  
g and empathic feedback; encouraged, explored, and   
supported the pt as she processed current symptoms, Issues, and concerns. 
~Discussed   
past tx and explored current needs/options. Acknowledged and validated pt's 
thoughts   
and emotions. ~Explored coping mechanisms and support network; utilized 
opportunity   
for safety planning; promoted seeking positive support and seeking help, as 
needed  
   
                                                    Last Documented On   
2 3:28PM ; [facility] provided active listenin  
g, support and helped pt process though current   
symptoms   
and stressor(s). Discussed and explored past effectiveness of medication; 
identified   
objectives and future goals; promoted use of healthy coping mechanisms, and 
self-care   
practices  
   
                                                    Last Documented On   
2 3:19PM ; [facility]  
   
                                                    Reviewed side effects and Ri  
sks/Benefits analysis  
   
                                                    Last Documented On   
2 3:19PM ; [facility]  
   
                                                    Discussed nutritional needs   
teach healthy choices including fruits and   
vegetables  
   
                                                    Last Documented On   
2 3:15PM ; [facility]  
   
                                                    Patient education about a pr  
oper diet  
   
                                                    Last Documented On   
2 3:15PM ; [facility]  
   
                                                    Discussed concerns about exe  
rcise : promote physical activity  
   
                                                    Last Documented On   
2 3:15PM ; [facility] introduced pt to [facility] in  
tegrated model of care ~[facility] offered active listening  
and   
supportive feedback; normalized emotions and feelings, also provided pt time to   
process any current stressors ~[facility] discussed potential benefits of counseling 
and   
supported re-engaging, as needed. ~[facility] encouraged pt to continue to make time to
  
implement self-care regimen and use coping methods, as needed  
   
                                                    Last Documented On   
2 4:07PM ; [facility]  
   
                                                    Discussed nutritional needs   
teach healthy choices including fruits and   
vegetables  
   
                                                    Last Documented On   
2 3:15PM ; [facility]  
   
                                                    Patient education about a pr  
oper diet  
   
                                                    Last Documented On   
2 3:15PM ; [facility]  
   
                                                    Inquiry and counseling about  
 medication administration and compliance  
   
                                                    Last Documented On   
2 7:37PM ; [facility]  
   
                                                    Discussed concerns about exe  
rcise : promote physical activity  
   
                                                    Last Documented On   
2 3:15PM ; [facility]  
   
                                                    Patient goals discussed  
   
                                                    Last Documented On   
2 7:37PM ; [facility]  
   
                                                    Ansewred pt's questions re B  
orderlline Personality D/O and Bipolar D/O raised by  
  
psychiatrist at [place]. ~Validated and normalized patient?s feelings while   
assisting to process recent events  
   
                                                    Last Documented On   
1 1:33AM ; [facility]  
   
                                                    Discussed nutritional needs   
teach healthy choices including fruits and   
vegetables  
   
                                                    Last Documented On   
1 2:04PM ; [facility]  
   
                                                    Patient education about a pr  
oper diet  
   
                                                    Last Documented On   
1 2:04PM ; [facility]  
   
                                                    Discussed concerns about exe  
rcise : promote physical activity  
   
                                                    Last Documented On   
1 2:04PM ; [facility] provided active listenin  
g, support and helped patient process through   
current   
symptoms and stressors with ongoing mental health concerns and medication 
changes.   
~[facility] discussed coping skills and supports that patient is implementing.  
discussed   
implementing coping skills as discussed with counseling and attending weekly   
appointments as scheduled with counselor. Patient was encouraged to continue 
writing   
down concerns with medications and discuss with providers. ~[facility] reminded patient
of   
crisis resources should they be needed. Patient reports having crisis resources 
and   
could return to ER  
   
                                                    Last Documented On   
1 10:17AM ; [facility]  
   
                                                    Patient education about a pr  
oper diet  
   
                                                    Last Documented On   
1 5:17PM ; [facility]  
   
                                                    Patient education about meal  
 planning  
   
                                                    Last Documented On   
1 5:17PM ; [facility]  
   
                                                    Education about changing eat  
ing habits  
   
                                                    Last Documented On   
1 5:17PM ; [facility]  
   
                                                    Patient education about high  
 fiber diet  
   
                                                    Last Documented On   
1 5:17PM ; [facility]  
   
                                                    Patient education about low   
fat diet  
   
                                                    Last Documented On   
1 5:17PM ; [facility]  
   
                                                    Patient education about low   
cholesterol diet  
   
                                                    Last Documented On   
1 5:17PM ; [facility]  
   
                                                    Patient education about low   
carbohydrate diet  
   
                                                    Last Documented On   
1 5:17PM ; [facility]  
   
                                                    Patient education about high  
 protein diet  
   
                                                    Last Documented On   
1 5:17PM ; [facility] offered active and suppo  
rtive listening, normalized emotions and feelings,   
and   
processed current stressors. ~[facility] discussed resources for finding a counselor 
and   
provided list of local resources. ~[facility] discussed patients coping skills and 
supports   
and encouraged patient to continue to implement. [facility] reminded patient of crisis
  
resources should they be needed  
   
                                                    Last Documented On   
1 7:15PM ; [facility]  
   
                                                    Discussed nutritional needs   
teach healthy choices including fruits and   
vegetables  
   
                                                    Last Documented On   
1 11:38AM ; [facility]  
   
                                                    Patient education about a pr  
oper diet  
   
                                                    Last Documented On   
1 11:38AM ; [facility]  
   
                                                    Patient education about a pr  
oper diet  
   
                                                    Last Documented On   
1 12:19PM ; [facility]  
   
                                                    Patient education about meal  
 planning  
   
                                                    Last Documented On   
1 12:19PM ; [facility]  
   
                                                    Education about changing eat  
ing habits  
   
                                                    Last Documented On   
1 12:19PM ; [facility]  
   
                                                    Patient education about high  
 fiber diet  
   
                                                    Last Documented On   
1 12:19PM ; [facility]  
   
                                                    Patient education about low   
fat diet  
   
                                                    Last Documented On   
1 12:19PM ; [facility]  
   
                                                    Patient education about low   
cholesterol diet  
   
                                                    Last Documented On   
1 12:19PM ; [facility]  
   
                                                    Patient education about low   
carbohydrate diet  
   
                                                    Last Documented On   
1 12:19PM ; [facility]  
   
                                                    Patient education about high  
 protein diet  
   
                                                    Last Documented On   
1 12:19PM ; [facility]  
   
                                                    Discussed concerns about exe  
rcise : promote physical activity  
   
                                                    Last Documented On   
1 11:38AM ; [facility]  
g, support and helped patient process through   
current   
symptoms and stressors related to family conflict. ~P discussed coping skills 
and   
supports with patient that can be implemented and reminded patient of ways to 
access   
additional resources. ~P discussed crisis resources and plan. Patient has 
crisis   
resources still available should they be needed  
   
                                                    Last Documented On 06/10/202  
1 7:04PM ; [facility]  
   
                                                    Discussed nutritional needs   
teach healthy choices including fruits and   
vegetables  
   
                                                    Last Documented On 06/10/202  
1 3:57PM ; [facility]  
   
                                                    Patient education about a pr  
oper diet  
   
                                                    Last Documented On 06/10/202  
1 3:57PM ; [facility]  
   
                                                    Discussed concerns about exe  
rcise : promote physical activity  
   
                                                    Last Documented On 06/10/202  
1 3:57PM ; [facility] model of care. [facility] and PCP reassured   
patient of not sharing information with anyone unless she has signed a release 
for us   
to do so. ~P provided active listening, support and helped patient process 
through   
current symptoms and stressors. ~P discussed establishing counseling and   
psychiatry. [facility] discussed EMDR therapy and ways to find provider who does this 
type   
of therapy. ~[facility] discussed crisis resources should mood worsen, [facility] provided 
text   
hotline number for crisis. [facility] reviewed crisis plan with patient and patient is 
able   
to contact positive supports and family when feeling down  
   
                                                    Last Documented On   
1 11:46AM ; [facility]  
   
                                                    Discussed nutritional needs   
teach healthy choices including fruits and   
vegetables  
   
                                                    Last Documented On   
1 2:11PM ; [facility]  
   
                                                    Patient education about a pr  
oper diet  
   
                                                    Last Documented On   
1 2:11PM ; [facility]  
   
                                                    Discussed concerns about exe  
rcise : promote physical activity  
   
                                                    Last Documented On   
1 2:11PM ; [facility]  
Work Phone: [phone]  
  
Includes: Instructions for all patient encounters  
  
  
  
                                                    Education and Decision Aids   
were provided during visit for:  
   
                                                    ~*[facility] offered active and sup  
portive listening, normalized emotions and feelings,  
and   
processed ~current stressors. ~*Discussed engageing in counseling and looking 
into   
EMDR to address PTSD and increased nightmares  
   
                                                    Last Documented On   
4 4:14PM ; [facility]  
   
                                                    Discussed nutritional needs   
teach healthy choices including fruits and   
vegetables  
   
                                                    Last Documented On   
4 4:33PM ; [facility]  
   
                                                    Patient education about a pr  
oper diet  
   
                                                    Last Documented On   
4 4:33PM ; [facility]  
   
                                                    Discussed concerns about exe  
rcise : promote physical activity  
   
                                                    Last Documented On   
4 4:33PM ; [facility] offered active and suppo  
rtive listening and processed recent stressors. ~[facility]  
  
discussed coping skills and supports to implement in managing increased anxiety 
such   
as tools learned previously in therapy. ~[facility] discussed sleep hygiene strategies 
that   
can be implemented. ~[facility] encouraged patient to follow-up with specialists as   
scheduled  
   
                                                    Last Documented On   
4 3:26PM ; [facility]  
   
                                                    Discussed nutritional needs   
teach healthy choices including fruits and   
vegetables  
   
                                                    Last Documented On   
4 4:51PM ; [facility]  
   
                                                    Patient education about a pr  
oper diet  
   
                                                    Last Documented On   
4 4:51PM ; [facility]  
   
                                                    Discussed concerns about exe  
rcise : promote physical activity  
   
                                                    Last Documented On   
4 4:51PM ; [facility]  
   
                                                    Referred Patient to a [facility]  
es Self-Management Program  
   
                                                    Last Documented On   
4 5:22PM ; [facility] offered active and suppo  
rtive listening and processed current stressors.   
~P   
discussed coping skills and supports that can be implemented to manage increased
  
anxiety and stressors. [facility] discussed potential of resuming counseling, even if 
for   
monthly visits to process ongoing stressors. ~[facility] encouraged patient to follow-
up on   
referrals as discussed with PCP  
   
                                                    Last Documented On   
4 10:08AM ; [facility]  
   
                                                    Discussed nutritional needs   
teach healthy choices including fruits and   
vegetables  
   
                                                    Last Documented On   
4 4:46PM ; [facility]  
   
                                                    Patient education about a pr  
oper diet  
   
                                                    Last Documented On   
4 4:46PM ; [facility]  
   
                                                    Discussed concerns about exe  
rcise : promote physical activity  
   
                                                    Last Documented On   
4 4:46PM ; [facility]  
   
                                                    Not requesting contraception  
   
                                                    Last Documented On   
4 4:46PM ; [facility] and suppo  
rtive listening and processed current stressors   
related   
to getting medications. ~[facility] discussed coping skills and supports to implement 
in   
daily routine. ~[facility] discussed progress patient has felt they have made recently 
and   
encouraged continued follow-up with providers to address health  
   
                                                    Last Documented On   
4 5:16PM ; [facility]  
   
                                                    Discussed nutritional needs   
teach healthy choices including fruits and   
vegetables  
   
                                                    Last Documented On   
4 7:14PM ; [facility]  
   
                                                    Patient education about a pr  
oper diet  
   
                                                    Last Documented On   
4 7:14PM ; [facility]  
   
                                                    Discussed concerns about exe  
rcise : promote physical activity  
   
                                                    Last Documented On   
4 7:14PM ; [facility]  
   
                                                    Not requesting contraception  
   
                                                    Last Documented On   
4 7:14PM ; [facility]  
   
                                                    Discussed nutritional needs   
teach healthy choices including fruits and   
vegetables  
   
                                                    Last Documented On   
3 5:15PM ; [facility]  
   
                                                    Patient education about a pr  
oper diet  
   
                                                    Last Documented On   
3 5:15PM ; [facility]  
   
                                                    Discussed concerns about exe  
rcise : promote physical activity  
   
                                                    Last Documented On   
3 5:15PM ; [facility]  
   
                                                    Discussed nutritional needs   
teach healthy choices including fruits and   
vegetables  
   
                                                    Last Documented On 10/21/202  
2 1:42PM ; [facility]  
   
                                                    Patient education about a pr  
oper diet  
   
                                                    Last Documented On 10/21/202  
2 1:42PM ; [facility]  
   
                                                    Discussed concerns about exe  
rcise : promote physical activity  
   
                                                    Last Documented On 10/21/202  
2 1:42PM ; [facility] offered active listening  
 and supportive feedback; normalized emotions and   
feelings, also provided pt time to process any current stressors. ~Promoted and   
encouraged follow-through with scheduling psychiatric services  
   
                                                    Last Documented On   
2 3:21PM ; [facility] provided supportive, empa  
thic listening and reflective feedback. ~Explored,   
encouraged, and supported the pt to discuss current sx/mood, assess risk for   
harm/need, coping mechanisms, support network and safety planning. ~Supported 
pt's   
plan to f/up with Dr. Alonso, as planned at [place], [place]. ~Encouraged 
pt to   
use safety plan, if needed to ensure she remains safe  
   
                                                    Last Documented On   
2 2:27PM ; [facility]  
   
                                                    Discussed nutritional needs   
teach healthy choices including fruits and   
vegetables  
   
                                                    Last Documented On   
2 3:20PM ; [facility]  
   
                                                    Patient education about a pr  
oper diet  
   
                                                    Last Documented On   
2 3:20PM ; [facility]  
   
                                                    Discussed concerns about exe  
rcise : promote physical activity ~ ~Will restart   
trazodone and prazosin ~ ~Patient is planning to have brother stay with her for 
a few   
days for emotional support ~ ~Follow up with PCP at next scheduled visit ~ ~Call
  
psychiatrist office to schedule appt ~ ~Call counselor  
   
                                                    Last Documented On   
2 9:35AM ; [facility]  
   
                                                    Provided supportive listenin  
g and empathic feedback; encouraged, explored, and   
supported the pt as she processed current symptoms, Issues, and concerns. 
~Discussed   
past tx and explored current needs/options. Acknowledged and validated pt's 
thoughts   
and emotions. ~Explored coping mechanisms and support network; utilized 
opportunity   
for safety planning; promoted seeking positive support and seeking help, as 
needed  
   
                                                    Last Documented On   
2 3:28PM ; [facility] provided active listenin  
g, support and helped pt process though current   
symptoms   
and stressor(s). Discussed and explored past effectiveness of medication; 
identified   
objectives and future goals; promoted use of healthy coping mechanisms, and 
self-care   
practices  
   
                                                    Last Documented On   
2 3:19PM ; [facility]  
   
                                                    Reviewed side effects and Ri  
sks/Benefits analysis  
   
                                                    Last Documented On   
2 3:19PM ; [facility]  
   
                                                    Discussed nutritional needs   
teach healthy choices including fruits and   
vegetables  
   
                                                    Last Documented On   
2 3:15PM ; [facility]  
   
                                                    Patient education about a pr  
oper diet  
   
                                                    Last Documented On   
2 3:15PM ; [facility]  
   
                                                    Discussed concerns about exe  
rcise : promote physical activity  
   
                                                    Last Documented On   
2 3:15PM ; [facility] introduced pt to [facility] in  
tegrated model of care ~[facility] offered active listening  
and   
supportive feedback; normalized emotions and feelings, also provided pt time to   
process any current stressors ~[facility] discussed potential benefits of counseling 
and   
supported re-engaging, as needed. ~[facility] encouraged pt to continue to make time to
  
implement self-care regimen and use coping methods, as needed  
   
                                                    Last Documented On   
2 4:07PM ; [facility]  
   
                                                    Discussed nutritional needs   
teach healthy choices including fruits and   
vegetables  
   
                                                    Last Documented On   
2 3:15PM ; [facility]  
   
                                                    Patient education about a pr  
oper diet  
   
                                                    Last Documented On   
2 3:15PM ; [facility]  
   
                                                    Inquiry and counseling about  
 medication administration and compliance  
   
                                                    Last Documented On   
2 7:37PM ; [facility]  
   
                                                    Discussed concerns about exe  
rcise : promote physical activity  
   
                                                    Last Documented On   
2 3:15PM ; [facility]  
   
                                                    Patient goals discussed  
   
                                                    Last Documented On   
2 7:37PM ; [facility]  
   
                                                    Ansewred pt's questions re B  
orderlline Personality D/O and Bipolar D/O raised by  
  
psychiatrist at [place]. ~Validated and normalized patient?s feelings while   
assisting to process recent events  
   
                                                    Last Documented On   
1 1:33AM ; [facility]  
   
                                                    Discussed nutritional needs   
teach healthy choices including fruits and   
vegetables  
   
                                                    Last Documented On   
1 2:04PM ; [facility]  
   
                                                    Patient education about a pr  
oper diet  
   
                                                    Last Documented On   
1 2:04PM ; [facility]  
   
                                                    Discussed concerns about exe  
rcise : promote physical activity  
   
                                                    Last Documented On   
1 2:04PM ; [facility] provided active listenin  
g, support and helped patient process through   
current   
symptoms and stressors with ongoing mental health concerns and medication 
changes.   
[facility] discussed coping skills and supports that patient is implementing.  
discussed   
implementing coping skills as discussed with counseling and attending weekly   
appointments as scheduled with counselor. Patient was encouraged to continue 
writing   
down concerns with medications and discuss with providers. [facility] reminded patient
of   
crisis resources should they be needed. Patient reports having crisis resources 
and   
could return to ER  
   
                                                    Last Documented On   
1 10:17AM ; [facility]  
   
                                                    Patient education about a pr  
oper diet  
   
                                                    Last Documented On   
1 5:17PM ; [facility]  
   
                                                    Patient education about meal  
 planning  
   
                                                    Last Documented On   
1 5:17PM ; [facility]  
   
                                                    Education about changing eat  
ing habits  
   
                                                    Last Documented On   
1 5:17PM ; [facility]  
   
                                                    Patient education about high  
 fiber diet  
   
                                                    Last Documented On   
1 5:17PM ; [facility]  
   
                                                    Patient education about low   
fat diet  
   
                                                    Last Documented On   
1 5:17PM ; [facility]  
   
                                                    Patient education about low   
cholesterol diet  
   
                                                    Last Documented On   
1 5:17PM ; [facility]  
   
                                                    Patient education about low   
carbohydrate diet  
   
                                                    Last Documented On   
1 5:17PM ; [facility]  
   
                                                    Patient education about high  
 protein diet  
   
                                                    Last Documented On   
1 5:17PM ; [facility] offered active and suppo  
rtive listening, normalized emotions and feelings,   
and   
processed current stressors. [facility] discussed resources for finding a counselor 
and   
provided list of local resources. [facility] discussed patients coping skills and 
supports   
and encouraged patient to continue to implement. [facility] reminded patient of crisis
  
resources should they be needed  
   
                                                    Last Documented On   
1 7:15PM ; [facility]  
   
                                                    Discussed nutritional needs   
teach healthy choices including fruits and   
vegetables  
   
                                                    Last Documented On   
1 11:38AM ; [facility]  
   
                                                    Patient education about a pr  
oper diet  
   
                                                    Last Documented On   
1 11:38AM ; [facility]  
   
                                                    Patient education about a pr  
oper diet  
   
                                                    Last Documented On   
1 12:19PM ; [facility]  
   
                                                    Patient education about meal  
 planning  
   
                                                    Last Documented On   
1 12:19PM ; [facility]  
   
                                                    Education about changing eat  
ing habits  
   
                                                    Last Documented On   
1 12:19PM ; [facility]  
   
                                                    Patient education about high  
 fiber diet  
   
                                                    Last Documented On   
1 12:19PM ; [facility]  
   
                                                    Patient education about low   
fat diet  
   
                                                    Last Documented On   
1 12:19PM ; [facility]  
   
                                                    Patient education about low   
cholesterol diet  
   
                                                    Last Documented On   
1 12:19PM ; [facility]  
   
                                                    Patient education about low   
carbohydrate diet  
   
                                                    Last Documented On   
1 12:19PM ; [facility]  
   
                                                    Patient education about high  
 protein diet  
   
                                                    Last Documented On   
1 12:19PM ; [facility]  
   
                                                    Discussed concerns about exe  
rcise : promote physical activity  
   
                                                    Last Documented On   
1 11:38AM ; [facility]  
g, support and helped patient process through   
current   
symptoms and stressors related to family conflict. ~P discussed coping skills 
and   
supports with patient that can be implemented and reminded patient of ways to 
access   
additional resources. ~P discussed crisis resources and plan. Patient has 
crisis   
resources still available should they be needed  
   
                                                    Last Documented On 06/10/202  
1 7:04PM ; [facility]  
   
                                                    Discussed nutritional needs   
teach healthy choices including fruits and   
vegetables  
   
                                                    Last Documented On 06/10/202  
1 3:57PM ; [facility]  
   
                                                    Patient education about a pr  
oper diet  
   
                                                    Last Documented On 06/10/202  
1 3:57PM ; [facility]  
   
                                                    Discussed concerns about exe  
rcise : promote physical activity  
   
                                                    Last Documented On 06/10/202  
1 3:57PM ; [facility] model of [facility]. [facility] and PCP reassured   
patient of not sharing information with anyone unless she has signed a release 
for us   
to do so. ~P provided active listening, support and helped patient process 
through   
current symptoms and stressors. ~P discussed establishing counseling and   
psychiatry. [facility] discussed EMDR therapy and ways to find provider who does this 
type   
of therapy. ~P discussed crisis resources should mood worsen, [facility] provided 
text   
hotline number for crisis. [facility] reviewed crisis plan with patient and patient is 
able   
to contact positive supports and family when feeling down  
   
                                                    Last Documented On   
1 11:46AM ; [facility]  
   
                                                    Discussed nutritional needs   
teach healthy choices including fruits and   
vegetables  
   
                                                    Last Documented On   
1 2:11PM ; [facility]  
   
                                                    Patient education about a pr  
oper diet  
   
                                                    Last Documented On   
1 2:11PM ; [facility]  
   
                                                    Discussed concerns about exe  
rcise : promote physical activity  
   
                                                    Last Documented On   
1 2:11PM ; [facility]  
Work Phone: [phone]  
  
Includes: Instructions for all patient encounters  
  
  
  
                                                    Education and Decision Aids   
were provided during visit for:  
   
                                                    ~*[facility] offered active and sup  
portive listening, normalized emotions and feelings,  
and   
processed ~current stressors. ~*Discussed engageing in counseling and looking 
into   
EMDR to address PTSD and increased nightmares  
   
                                                    Last Documented On   
4 4:14PM ; [facility]  
   
                                                    Discussed nutritional needs   
teach healthy choices including fruits and   
vegetables  
   
                                                    Last Documented On   
4 4:33PM ; [facility]  
   
                                                    Patient education about a pr  
oper diet  
   
                                                    Last Documented On   
4 4:33PM ; [facility]  
   
                                                    Discussed concerns about exe  
rcise : promote physical activity  
   
                                                    Last Documented On   
4 4:33PM ; [facility] offered active and suppo  
rtive listening and processed recent stressors. ~[facility]  
  
discussed coping skills and supports to implement in managing increased anxiety 
such   
as tools learned previously in therapy. ~[facility] discussed sleep hygiene strategies 
that   
can be implemented. ~[facility] encouraged patient to follow-up with specialists as   
scheduled  
   
                                                    Last Documented On   
4 3:26PM ; [facility]  
   
                                                    Discussed nutritional needs   
teach healthy choices including fruits and   
vegetables  
   
                                                    Last Documented On   
4 4:51PM ; [facility]  
   
                                                    Patient education about a pr  
oper diet  
   
                                                    Last Documented On   
4 4:51PM ; [facility]  
   
                                                    Discussed concerns about exe  
rcise : promote physical activity  
   
                                                    Last Documented On   
4 4:51PM ; [facility]  
   
                                                    Referred Patient to a [facility]  
es Self-Management Program  
   
                                                    Last Documented On   
4 5:22PM ; [facility] offered active and suppo  
rtive listening and processed current stressors.   
~[facility]   
discussed coping skills and supports that can be implemented to manage increased
  
anxiety and stressors. P discussed potential of resuming counseling, even if 
for   
monthly visits to process ongoing stressors. ~[facility] encouraged patient to follow-
up on   
referrals as discussed with PCP  
   
                                                    Last Documented On   
4 10:08AM ; [facility]  
   
                                                    Discussed nutritional needs   
teach healthy choices including fruits and   
vegetables  
   
                                                    Last Documented On   
4 4:46PM ; [facility]  
   
                                                    Patient education about a pr  
oper diet  
   
                                                    Last Documented On   
4 4:46PM ; [facility]  
   
                                                    Discussed concerns about exe  
rcise : promote physical activity  
   
                                                    Last Documented On   
4 4:46PM ; [facility]  
   
                                                    Not requesting contraception  
   
                                                    Last Documented On   
4 4:46PM ; [facility] offered active and suppo  
rtive listening and processed current stressors   
related   
to getting medications. ~[facility] discussed coping skills and supports to implement 
in   
daily routine. ~[facility] discussed progress patient has felt they have made recently 
and   
encouraged continued follow-up with providers to address health  
   
                                                    Last Documented On   
4 5:16PM ; [facility]  
   
                                                    Discussed nutritional needs   
teach healthy choices including fruits and   
vegetables  
   
                                                    Last Documented On   
4 7:14PM ; [facility]  
   
                                                    Patient education about a pr  
oper diet  
   
                                                    Last Documented On   
4 7:14PM ; [facility]  
   
                                                    Discussed concerns about exe  
rcise : promote physical activity  
   
                                                    Last Documented On   
4 7:14PM ; [facility]  
   
                                                    Not requesting contraception  
   
                                                    Last Documented On   
4 7:14PM ; [facility]  
   
                                                    Discussed nutritional needs   
teach healthy choices including fruits and   
vegetables  
   
                                                    Last Documented On   
3 5:15PM ; [facility]  
   
                                                    Patient education about a pr  
oper diet  
   
                                                    Last Documented On   
3 5:15PM ; [facility]  
   
                                                    Discussed concerns about exe  
rcise : promote physical activity  
   
                                                    Last Documented On   
3 5:15PM ; [facility]  
   
                                                    Discussed nutritional needs   
teach healthy choices including fruits and   
vegetables  
   
                                                    Last Documented On 10/21/202  
2 1:42PM ; [facility]  
   
                                                    Patient education about a pr  
oper diet  
   
                                                    Last Documented On 10/21/202  
2 1:42PM ; [facility]  
   
                                                    Discussed concerns about exe  
rcise : promote physical activity  
   
                                                    Last Documented On 10/21/202  
2 1:42PM ; [facility] offered active listening  
 and supportive feedback; normalized emotions and   
feelings, also provided pt time to process any current stressors. ~Promoted and   
encouraged follow-through with scheduling psychiatric services  
   
                                                    Last Documented On   
2 3:21PM ; [facility] provided supportive, empa  
thic listening and reflective feedback. ~Explored,   
encouraged, and supported the pt to discuss current sx/mood, assess risk for   
harm/need, coping mechanisms, support network and safety planning. ~Supported 
pt's   
plan to f/up with Dr. Alonso, as planned at [place], [place]. ~Encouraged 
pt to   
use safety plan, if needed to ensure she remains safe  
   
                                                    Last Documented On   
2 2:27PM ; [facility]  
   
                                                    Discussed nutritional needs   
teach healthy choices including fruits and   
vegetables  
   
                                                    Last Documented On   
2 3:20PM ; [facility]  
   
                                                    Patient education about a pr  
oper diet  
   
                                                    Last Documented On   
2 3:20PM ; [facility]  
   
                                                    Discussed concerns about exe  
rcise : promote physical activity ~ ~Will restart   
trazodone and prazosin ~ ~Patient is planning to have brother stay with her for 
a few   
days for emotional support ~ ~Follow up with PCP at next scheduled visit ~ ~Call
  
psychiatrist office to schedule appt ~ ~Call counselor  
   
                                                    Last Documented On   
2 9:35AM ; [facility]  
   
                                                    Provided supportive listenin  
g and empathic feedback; encouraged, explored, and   
supported the pt as she processed current symptoms, Issues, and concerns. 
~Discussed   
past tx and explored current needs/options. Acknowledged and validated pt's 
thoughts   
and emotions. ~Explored coping mechanisms and support network; utilized 
opportunity   
for safety planning; promoted seeking positive support and seeking help, as 
needed  
   
                                                    Last Documented On   
2 3:28PM ; [facility] provided active listenin  
g, support and helped pt process though current   
symptoms   
and stressor(s). Discussed and explored past effectiveness of medication; 
identified   
objectives and future goals; promoted use of healthy coping mechanisms, and 
self-care   
practices  
   
                                                    Last Documented On   
2 3:19PM ; [facility]  
   
                                                    Reviewed side effects and Ri  
sks/Benefits analysis  
   
                                                    Last Documented On   
2 3:19PM ; [facility]  
   
                                                    Discussed nutritional needs   
teach healthy choices including fruits and   
vegetables  
   
                                                    Last Documented On   
2 3:15PM ; [facility]  
   
                                                    Patient education about a pr  
oper diet  
   
                                                    Last Documented On   
2 3:15PM ; [facility]  
   
                                                    Discussed concerns about exe  
rcise : promote physical activity  
   
                                                    Last Documented On   
2 3:15PM ; [facility] introduced pt to [facility] in  
tegrated model of care ~P offered active listening  
and   
supportive feedback; normalized emotions and feelings, also provided pt time to   
process any current stressors ~P discussed potential benefits of counseling 
and   
supported re-engaging, as needed. ~P encouraged pt to continue to make time to
  
implement self-care regimen and use coping methods, as needed  
   
                                                    Last Documented On   
2 4:07PM ; [facility]  
   
                                                    Discussed nutritional needs   
teach healthy choices including fruits and   
vegetables  
   
                                                    Last Documented On   
2 3:15PM ; [facility]  
   
                                                    Patient education about a pr  
oper diet  
   
                                                    Last Documented On   
2 3:15PM ; [facility]  
   
                                                    Inquiry and counseling about  
 medication administration and compliance  
   
                                                    Last Documented On   
2 7:37PM ; [facility]  
   
                                                    Discussed concerns about exe  
rcise : promote physical activity  
   
                                                    Last Documented On   
2 3:15PM ; [facility]  
   
                                                    Patient goals discussed  
   
                                                    Last Documented On   
2 7:37PM ; [facility]  
   
                                                    Ansewred pt's questions re B  
orderlline Personality D/O and Bipolar D/O raised by  
  
psychiatrist at [place]. ~Validated and normalized patient?s feelings while   
assisting to process recent events  
   
                                                    Last Documented On   
1 1:33AM ; [facility]  
   
                                                    Discussed nutritional needs   
teach healthy choices including fruits and   
vegetables  
   
                                                    Last Documented On   
1 2:04PM ; [facility]  
   
                                                    Patient education about a pr  
oper diet  
   
                                                    Last Documented On   
1 2:04PM ; [facility]  
   
                                                    Discussed concerns about exe  
rcise : promote physical activity  
   
                                                    Last Documented On   
1 2:04PM ; [facility] provided active listenin  
g, support and helped patient process through   
current   
symptoms and stressors with ongoing mental health concerns and medication 
changes.   
[facility] discussed coping skills and supports that patient is implementing.  
discussed   
implementing coping skills as discussed with counseling and attending weekly   
appointments as scheduled with counselor. Patient was encouraged to continue 
writing   
down concerns with medications and discuss with providers. [facility] reminded patient
of   
crisis resources should they be needed. Patient reports having crisis resources 
and   
could return to ER  
   
                                                    Last Documented On   
1 10:17AM ; [facility]  
   
                                                    Patient education about a pr  
oper diet  
   
                                                    Last Documented On   
1 5:17PM ; [facility]  
   
                                                    Patient education about meal  
 planning  
   
                                                    Last Documented On   
1 5:17PM ; [facility]  
   
                                                    Education about changing eat  
ing habits  
   
                                                    Last Documented On   
1 5:17PM ; [facility]  
   
                                                    Patient education about high  
 fiber diet  
   
                                                    Last Documented On   
1 5:17PM ; [facility]  
   
                                                    Patient education about low   
fat diet  
   
                                                    Last Documented On   
1 5:17PM ; [facility]  
   
                                                    Patient education about low   
cholesterol diet  
   
                                                    Last Documented On   
1 5:17PM ; [facility]  
   
                                                    Patient education about low   
carbohydrate diet  
   
                                                    Last Documented On   
1 5:17PM ; [facility]  
   
                                                    Patient education about high  
 protein diet  
   
                                                    Last Documented On   
1 5:17PM ; [facility] offered active and suppo  
rtive listening, normalized emotions and feelings,   
and   
processed current stressors. [facility] discussed resources for finding a counselor 
and   
provided list of local resources. [facility] discussed patients coping skills and 
supports   
and encouraged patient to continue to implement. [facility] reminded patient of crisis
  
resources should they be needed  
   
                                                    Last Documented On   
1 7:15PM ; [facility]  
   
                                                    Discussed nutritional needs   
teach healthy choices including fruits and   
vegetables  
   
                                                    Last Documented On   
1 11:38AM ; [facility]  
   
                                                    Patient education about a pr  
oper diet  
   
                                                    Last Documented On   
1 11:38AM ; [facility]  
   
                                                    Patient education about a pr  
oper diet  
   
                                                    Last Documented On   
1 12:19PM ; [facility]  
   
                                                    Patient education about meal  
 planning  
   
                                                    Last Documented On   
1 12:19PM ; [facility]  
   
                                                    Education about changing eat  
ing habits  
   
                                                    Last Documented On   
1 12:19PM ; [facility]  
   
                                                    Patient education about high  
 fiber diet  
   
                                                    Last Documented On   
1 12:19PM ; [facility]  
   
                                                    Patient education about low   
fat diet  
   
                                                    Last Documented On   
1 12:19PM ; [facility]  
   
                                                    Patient education about low   
cholesterol diet  
   
                                                    Last Documented On   
1 12:19PM ; [facility]  
   
                                                    Patient education about low   
carbohydrate diet  
   
                                                    Last Documented On   
1 12:19PM ; [facility]  
   
                                                    Patient education about high  
 protein diet  
   
                                                    Last Documented On   
1 12:19PM ; [facility]  
   
                                                    Discussed concerns about exe  
rcise : promote physical activity  
   
                                                    Last Documented On   
1 11:38AM ; [facility]  
g, support and helped patient process through   
current   
symptoms and stressors related to family conflict. ~P discussed coping skills 
and   
supports with patient that can be implemented and reminded patient of ways to 
access   
additional resources. ~P discussed crisis resources and plan. Patient has 
crisis   
resources still available should they be needed  
   
                                                    Last Documented On 06/10/202  
1 7:04PM ; [facility]  
   
                                                    Discussed nutritional needs   
teach healthy choices including fruits and   
vegetables  
   
                                                    Last Documented On 06/10/202  
1 3:57PM ; [facility]  
   
                                                    Patient education about a pr  
oper diet  
   
                                                    Last Documented On 06/10/202  
1 3:57PM ; [facility]  
   
                                                    Discussed concerns about exe  
rcise : promote physical activity  
   
                                                    Last Documented On 06/10/202  
1 3:57PM ; [facility] model of [facility]. [facility] and PCP reassured   
patient of not sharing information with anyone unless she has signed a release 
for us   
to do so. ~P provided active listening, support and helped patient process 
through   
current symptoms and stressors. ~P discussed establishing counseling and   
psychiatry. [facility] discussed EMDR therapy and ways to find provider who does this 
type   
of therapy. ~P discussed crisis resources should mood worsen, P provided 
text   
hotline number for crisis. P reviewed crisis plan with patient and patient is 
able   
to contact positive supports and family when feeling down  
   
                                                    Last Documented On   
1 11:46AM ; [facility]  
   
                                                    Discussed nutritional needs   
teach healthy choices including fruits and   
vegetables  
   
                                                    Last Documented On   
1 2:11PM ; [facility]  
   
                                                    Patient education about a pr  
oper diet  
   
                                                    Last Documented On   
1 2:11PM ; [facility]  
   
                                                    Discussed concerns about exe  
rcise : promote physical activity  
   
                                                    Last Documented On   
1 2:11PM ; [facility]  
Work Phone: [phone]  
  
Includes: Instructions for all patient encounters  
  
  
  
                                                    Education and Decision Aids   
were provided during visit for:  
   
                                                    Discussed nutritional needs   
teach healthy choices including fruits and   
vegetables  
   
                                                    Last Documented On 11/15/202  
4 4:41PM ; [facility]  
   
                                                    Patient education about a pr  
oper diet  
   
                                                    Last Documented On 11/15/202  
4 4:41PM ; [facility]  
   
                                                    Discussed concerns about exe  
rcise : promote physical activity  
   
                                                    Last Documented On 11/15/202  
4 4:41PM ; [facility]  
   
                                                    Not requesting contraception  
   
                                                    Last Documented On 11/15/202  
4 4:41PM ; [facility]  
   
                                                    ~*[facility] offered active and sup  
portive listening, normalized emotions and feelings,  
and   
processed ~current stressors. ~*Discussed engageing in counseling and looking 
into   
EMDR to address PTSD and increased nightmares  
   
                                                    Last Documented On   
4 4:14PM ; [facility]  
   
                                                    Discussed nutritional needs   
teach healthy choices including fruits and   
vegetables  
   
                                                    Last Documented On   
4 4:33PM ; [facility]  
   
                                                    Patient education about a pr  
oper diet  
   
                                                    Last Documented On   
4 4:33PM ; [facility]  
   
                                                    Discussed concerns about exe  
rcise : promote physical activity  
   
                                                    Last Documented On   
4 4:33PM ; [facility] offered active and suppo  
rtive listening and processed recent stressors. ~P  
  
discussed coping skills and supports to implement in managing increased anxiety 
such   
as tools learned previously in therapy. ~P discussed sleep hygiene strategies 
that   
can be implemented. ~[facility] encouraged patient to follow-up with specialists as   
scheduled  
   
                                                    Last Documented On   
4 3:26PM ; [facility]  
   
                                                    Discussed nutritional needs   
teach healthy choices including fruits and   
vegetables  
   
                                                    Last Documented On   
4 4:51PM ; [facility]  
   
                                                    Patient education about a pr  
oper diet  
   
                                                    Last Documented On   
4 4:51PM ; [facility]  
   
                                                    Discussed concerns about exe  
rcise : promote physical activity  
   
                                                    Last Documented On   
4 4:51PM ; [facility]  
   
                                                    Referred Patient to a [facility]  
es Self-Management Program  
   
                                                    Last Documented On   
4 5:22PM ; [facility] offered active and suppo  
rtive listening and processed current stressors.   
~P   
discussed coping skills and supports that can be implemented to manage increased
  
anxiety and stressors. P discussed potential of resuming counseling, even if 
for   
monthly visits to process ongoing stressors. ~[facility] encouraged patient to follow-
up on   
referrals as discussed with PCP  
   
                                                    Last Documented On   
4 10:08AM ; [facility]  
   
                                                    Discussed nutritional needs   
teach healthy choices including fruits and   
vegetables  
   
                                                    Last Documented On   
4 4:46PM ; [facility]  
   
                                                    Patient education about a pr  
oper diet  
   
                                                    Last Documented On   
4 4:46PM ; [facility]  
   
                                                    Discussed concerns about exe  
rcise : promote physical activity  
   
                                                    Last Documented On   
4 4:46PM ; [facility]  
   
                                                    Not requesting contraception  
   
                                                    Last Documented On   
4 4:46PM ; [facility] offered active and suppo  
rtive listening and processed current stressors   
related   
to getting medications. ~[facility] discussed coping skills and supports to implement 
in   
daily routine. ~[facility] discussed progress patient has felt they have made recently 
and   
encouraged continued follow-up with providers to address health  
   
                                                    Last Documented On   
4 5:16PM ; [facility]  
   
                                                    Discussed nutritional needs   
teach healthy choices including fruits and   
vegetables  
   
                                                    Last Documented On   
4 7:14PM ; [facility]  
   
                                                    Patient education about a pr  
oper diet  
   
                                                    Last Documented On   
4 7:14PM ; [facility]  
   
                                                    Discussed concerns about exe  
rcise : promote physical activity  
   
                                                    Last Documented On   
4 7:14PM ; [facility]  
   
                                                    Not requesting contraception  
   
                                                    Last Documented On   
4 7:14PM ; [facility]  
   
                                                    Discussed nutritional needs   
teach healthy choices including fruits and   
vegetables  
   
                                                    Last Documented On   
3 5:15PM ; [facility]  
   
                                                    Patient education about a pr  
oper diet  
   
                                                    Last Documented On   
3 5:15PM ; [facility]  
   
                                                    Discussed concerns about exe  
rcise : promote physical activity  
   
                                                    Last Documented On   
3 5:15PM ; [facility]  
   
                                                    Discussed nutritional needs   
teach healthy choices including fruits and   
vegetables  
   
                                                    Last Documented On 10/21/202  
2 1:42PM ; [facility]  
   
                                                    Patient education about a pr  
oper diet  
   
                                                    Last Documented On 10/21/202  
2 1:42PM ; [facility]  
   
                                                    Discussed concerns about exe  
rcise : promote physical activity  
   
                                                    Last Documented On 10/21/202  
2 1:42PM ; [facility]  
 and supportive feedback; normalized emotions and   
feelings, also provided pt time to process any current stressors. ~Promoted and   
encouraged follow-through with scheduling psychiatric services  
   
                                                    Last Documented On   
2 3:21PM ; [facility] provided supportive, empa  
thic listening and reflective feedback. ~Explored,   
encouraged, and supported the pt to discuss current sx/mood, assess risk for   
harm/need, coping mechanisms, support network and safety planning. ~Supported 
pt's   
plan to f/up with Dr. Alonso, as planned at [place], [place]. ~Encouraged 
pt to   
use safety plan, if needed to ensure she remains safe  
   
                                                    Last Documented On   
2 2:27PM ; [facility]  
   
                                                    Discussed nutritional needs   
teach healthy choices including fruits and   
vegetables  
   
                                                    Last Documented On   
2 3:20PM ; [facility]  
   
                                                    Patient education about a pr  
oper diet  
   
                                                    Last Documented On   
2 3:20PM ; [facility]  
   
                                                    Discussed concerns about exe  
rcise : promote physical activity ~ ~Will restart   
trazodone and prazosin ~ ~Patient is planning to have brother stay with her for 
a few   
days for emotional support ~ ~Follow up with PCP at next scheduled visit ~ ~Call
  
psychiatrist office to schedule appt ~ ~Call counselor  
   
                                                    Last Documented On   
2 9:35AM ; [facility]  
   
                                                    Provided supportive listenin  
g and empathic feedback; encouraged, explored, and   
supported the pt as she processed current symptoms, Issues, and concerns. 
~Discussed   
past tx and explored current needs/options. Acknowledged and validated pt's 
thoughts   
and emotions. ~Explored coping mechanisms and support network; utilized 
opportunity   
for safety planning; promoted seeking positive support and seeking help, as 
needed  
   
                                                    Last Documented On   
2 3:28PM ; [facility] provided active listenin  
g, support and helped pt process though current   
symptoms   
and stressor(s). Discussed and explored past effectiveness of medication; 
identified   
objectives and future goals; promoted use of healthy coping mechanisms, and 
self-care   
practices  
   
                                                    Last Documented On   
2 3:19PM ; [facility]  
   
                                                    Reviewed side effects and Ri  
sks/Benefits analysis  
   
                                                    Last Documented On   
2 3:19PM ; [facility]  
   
                                                    Discussed nutritional needs   
teach healthy choices including fruits and   
vegetables  
   
                                                    Last Documented On   
2 3:15PM ; [facility]  
   
                                                    Patient education about a pr  
oper diet  
   
                                                    Last Documented On   
2 3:15PM ; [facility]  
   
                                                    Discussed concerns about exe  
rcise : promote physical activity  
   
                                                    Last Documented On   
2 3:15PM ; [facility] introduced pt to [facility] in  
tegrated model of care ~[facility] offered active listening  
and   
supportive feedback; normalized emotions and feelings, also provided pt time to   
process any current stressors ~[facility] discussed potential benefits of counseling 
and   
supported re-engaging, as needed. ~[facility] encouraged pt to continue to make time to
  
implement self-care regimen and use coping methods, as needed  
   
                                                    Last Documented On   
2 4:07PM ; [facility]  
   
                                                    Discussed nutritional needs   
teach healthy choices including fruits and   
vegetables  
   
                                                    Last Documented On   
2 3:15PM ; [facility]  
   
                                                    Patient education about a pr  
oper diet  
   
                                                    Last Documented On   
2 3:15PM ; [facility]  
   
                                                    Inquiry and counseling about  
 medication administration and compliance  
   
                                                    Last Documented On   
2 7:37PM ; [facility]  
   
                                                    Discussed concerns about exe  
rcise : promote physical activity  
   
                                                    Last Documented On   
2 3:15PM ; [facility]  
   
                                                    Patient goals discussed  
   
                                                    Last Documented On   
2 7:37PM ; [facility]  
   
                                                    Ansewred pt's questions re B  
orderlline Personality D/O and Bipolar D/O raised by  
  
psychiatrist at [place]. ~Validated and normalized patient?s feelings while   
assisting to process recent events  
   
                                                    Last Documented On   
1 1:33AM ; [facility]  
   
                                                    Discussed nutritional needs   
teach healthy choices including fruits and   
vegetables  
   
                                                    Last Documented On   
1 2:04PM ; [facility]  
   
                                                    Patient education about a pr  
oper diet  
   
                                                    Last Documented On   
1 2:04PM ; [facility]  
   
                                                    Discussed concerns about exe  
rcise : promote physical activity  
   
                                                    Last Documented On   
1 2:04PM ; [facility] provided active listenin  
g, support and helped patient process through   
current   
symptoms and stressors with ongoing mental health concerns and medication 
changes.   
~[facility] discussed coping skills and supports that patient is implementing.  
discussed   
implementing coping skills as discussed with counseling and attending weekly   
appointments as scheduled with counselor. Patient was encouraged to continue 
writing   
down concerns with medications and discuss with providers. ~P reminded patient
of   
crisis resources should they be needed. Patient reports having crisis resources 
and   
could return to ER  
   
                                                    Last Documented On   
1 10:17AM ; [facility]  
   
                                                    Patient education about a pr  
oper diet  
   
                                                    Last Documented On   
1 5:17PM ; [facility]  
   
                                                    Patient education about meal  
 planning  
   
                                                    Last Documented On   
1 5:17PM ; [facility]  
   
                                                    Education about changing eat  
ing habits  
   
                                                    Last Documented On   
1 5:17PM ; [facility]  
   
                                                    Patient education about high  
 fiber diet  
   
                                                    Last Documented On   
1 5:17PM ; [facility]  
   
                                                    Patient education about low   
fat diet  
   
                                                    Last Documented On   
1 5:17PM ; [facility]  
   
                                                    Patient education about low   
cholesterol diet  
   
                                                    Last Documented On   
1 5:17PM ; [facility]  
   
                                                    Patient education about low   
carbohydrate diet  
   
                                                    Last Documented On   
1 5:17PM ; [facility]  
   
                                                    Patient education about high  
 protein diet  
   
                                                    Last Documented On   
1 5:17PM ; [facility] offered active and suppo  
rtive listening, normalized emotions and feelings,   
and   
processed current stressors. ~[facility] discussed resources for finding a counselor 
and   
provided list of local resources. ~P discussed patients coping skills and 
supports   
and encouraged patient to continue to implement. ~[facility] reminded patient of crisis
  
resources should they be needed  
   
                                                    Last Documented On   
1 7:15PM ; [facility]  
   
                                                    Discussed nutritional needs   
teach healthy choices including fruits and   
vegetables  
   
                                                    Last Documented On   
1 11:38AM ; [facility]  
   
                                                    Patient education about a pr  
oper diet  
   
                                                    Last Documented On   
1 11:38AM ; [facility]  
   
                                                    Patient education about a pr  
oper diet  
   
                                                    Last Documented On   
1 12:19PM ; [facility]  
   
                                                    Patient education about meal  
 planning  
   
                                                    Last Documented On   
1 12:19PM ; [facility]  
   
                                                    Education about changing eat  
ing habits  
   
                                                    Last Documented On   
1 12:19PM ; [facility]  
   
                                                    Patient education about high  
 fiber diet  
   
                                                    Last Documented On   
1 12:19PM ; [facility]  
   
                                                    Patient education about low   
fat diet  
   
                                                    Last Documented On   
1 12:19PM ; [facility]  
   
                                                    Patient education about low   
cholesterol diet  
   
                                                    Last Documented On   
1 12:19PM ; [facility]  
   
                                                    Patient education about low   
carbohydrate diet  
   
                                                    Last Documented On   
1 12:19PM ; [facility]  
   
                                                    Patient education about high  
 protein diet  
   
                                                    Last Documented On   
1 12:19PM ; [facility]  
   
                                                    Discussed concerns about exe  
rcise : promote physical activity  
   
                                                    Last Documented On   
1 11:38AM ; [facility] provided active listenin  
g, support and helped patient process through   
current   
symptoms and stressors related to family conflict. ~P discussed coping skills 
and   
supports with patient that can be implemented and reminded patient of ways to 
access   
additional resources. ~[facility] discussed crisis resources and plan. Patient has 
crisis   
resources still available should they be needed  
   
                                                    Last Documented On 06/10/202  
1 7:04PM ; [facility]  
   
                                                    Discussed nutritional needs   
teach healthy choices including fruits and   
vegetables  
   
                                                    Last Documented On 06/10/202  
1 3:57PM ; [facility]  
   
                                                    Patient education about a pr  
oper diet  
   
                                                    Last Documented On 06/10/202  
1 3:57PM ; [facility]  
   
                                                    Discussed concerns about exe  
rcise : promote physical activity  
   
                                                    Last Documented On 06/10/202  
1 3:57PM ; [facility]. [facility] and PCP reassured   
patient of not sharing information with anyone unless she has signed a release 
for us   
to do so. ~[facility] provided active listening, support and helped patient process 
through   
current symptoms and stressors. ~[facility] discussed establishing counseling and   
psychiatry. [facility] discussed EMDR therapy and ways to find provider who does this 
type   
of therapy. ~[facility] discussed crisis resources should mood worsen, [facility] provided 
text   
hotline number for crisis. [facility] reviewed crisis plan with patient and patient is 
able   
to contact positive supports and family when feeling down  
   
                                                    Last Documented On   
1 11:46AM ; [facility]  
   
                                                    Discussed nutritional needs   
teach healthy choices including fruits and   
vegetables  
   
                                                    Last Documented On   
1 2:11PM ; [facility]  
   
                                                    Patient education about a pr  
oper diet  
   
                                                    Last Documented On   
1 2:11PM ; [facility]  
   
                                                    Discussed concerns about exe  
rcise : promote physical activity  
   
                                                    Last Documented On   
1 2:11PM ; [facility]  
Work Phone: [phone] problem outcome Narrative  
  
Includes: Evaluations & Outcomes for active Goals  
  
No Outcomes RecordedFitchburg General Hospital  
Work Phone: [phone] note*   
  
Progress note  
  
  
  
                                Date            Encounter       Last Documented   
by  
   
                                10/07/2024      Chart Update    Last documented   
on 10/07/2024; 12:07 PM, Doreen Cabrera CNP;   
[facility]  
  
  
  
                                                      
  
  
** Active Problems & Conditions **  
- F90.9 - Attention-deficit Hyperactivity Disorder  
- F31.31 - Bipolar I Disorder, Most Recent Episode, Depressed Mild  
- E11.9 - Diabetes Mellitus Type 2 Without Complication  
- N94.6 - Dysmenorrhea  
- F43.10 - Post-traumatic Stress Disorder  
  
** Current Medication **  
- Alcohol Pads 70% use to cleanse skin prior to checking blood sugars, 90 days, 
3   
refills  
- ARIPiprazole 5 MG Oral Tablet take 1 tablet by mouth once daily, 30 days, 5 
refills  
- Atorvastatin Calcium 20 MG Oral Tablet take 1 tablet by mouth once daily at   
bedtime, 30 days, 5 refills  
- Blood Glucose System Sriram Kit use to check sugars once daily (use what is 
covered),   
30 days, 0 refills  
- busPIRone HCl 10 MG Oral Tablet take 1 tablet by mouth twice daily (d/c 5 mg 
rx),   
30 days, 5 refills  
- CareSens Lancets Miscellaneous use to check sugars once daily (dispense what 
is   
covered), 90 days, 3 refills  
- Colace 100 MG Oral Capsule take 1 capsule by mouth once daily at bedtime as 
needed   
for constipation, 30 days, 5 refills  
- [employer] Iron 325 (65 Fe) MG Oral Tablet take 1 tablet by mouth once daily, 30 
days, 5   
refills  
- Intuniv 1 MG Oral Tablet Extended Release 24 Hour take 1 tablet by mouth once 
daily   
at bedtime, 30 days, 5 refills  
- Januvia 50 MG Oral Tablet take 1 tablet by mouth once daily, 30 days, 5 
refills  
- lamoTRIgine 100 MG Oral Tablet take 1 tablet by mouth once daily, 30 days, 5   
refills  
- Lisinopril 5 MG Oral Tablet take 1 tablet by mouth once daily, 30 days, 5 
refills  
- MiraLax 17 GM/SCOOP Oral Powder mix 1 scoop with 8 ounces once daily, 30 days,
2   
refills  
- Narcan 4 MG/0.1ML Nasal Liquid spray in nostril for symptoms of overdose , may
  
repeat in 2 minutes if symptoms persist, 1 days, 0 refills  
- OneTouch Ultra In Vitro Strip USE ONE TEST STRIP TO CHECK BLOOD SUGARS ONCE 
DAILY,   
90 days, 3 refills  
- Prazosin HCl 1 MG Oral Capsule take 1 capsule by mouth twice daily, 30 days, 5
  
refills  
- SEROquel 50 MG Oral Tablet take 1 tablet by mouth once daily at bedtime (d/c   
trazodone), 30 days, 1 refills  
- Sertraline HCl 50 MG Oral Tablet take 1 tablet by mouth once daily, 30 days, 5
  
refills  
- Slynd 4 MG Oral Tablet take 1 tablet by mouth at the same time everyday to 
help   
regulate your period, 28 days, 2 refills  
  
** Past Medical/Surgical History **  
Reported:  
No Safety Measures. Has sex without a condom.  
Medical: No previous hospitalizations. Chronic illness and Sexually transmitted   
infection Partners sexually transmitted infection status known.  
Immunization History: Recent immunization for flu.  
Exposure: Exposure to COVID-19.  
Pregnancy: Previously pregnant 1 time(s) and para having 0 live birth(s). Not   
planning a pregnancy in the next year.  
Legal Documents: Consent form on file for procedure.  
Diagnoses:  
Polycystic Ovarian Syndrome (PCOS).  
Migraine headache.  
Psychiatric disorders biopolar disorder  
Anxiety disorder  
POTS.  
Procedural:  
- Insertion of ear pressure equalization tubes in both ears  
Surgical:  
- Tonsillectomy  
- Tonsillectomy with adenoidectomy  
  
** Allergies **  
- Abilify Reaction: groggy  
- Augmentin Reaction: Shock  
- Metformin Reaction: Nausea, Vomiting  
- NO KNOWN ENVIRONMENTAL ALLERGIES  
- NO KNOWN FOOD ALLERGIES  
- Sertraline Reaction: slow/groggy  
- Trazodone Hydrochloride Reaction: Panic attack  
  
** Family History **  
Paternal:  
Systemic hypertension  
Oncologic disorder  
Maternal:  
Systemic hypertension  
Epilepsy and recurrent seizures  
Psychiatric disorders  
Oncologic disorder  
Fraternal:  
Psychiatric disorders  
  
** Assessment **  
- D50.9 - Iron deficiency anemia, unspecified  
  
** Plan **  
StartCited- Iron deficiency anemia, unspecified  
Lab: Iron and TIBC  
Lab: CBC With Differential/Platelet  
EndCited  
** Care Team **  
- Doreen Cabrera CNP  
  
  
[facility])No Reason for 
Referral RecordedF[facility]burg [facility] Hospital  
Work Phone: [phone] of systems Narrative - Reported  
  
Review of Systems not supported for this document type  
  
No Review of Systems RecordedFitchburg General Hospital  
Work Phone: [phone]  
  
Summary Purpose  
  
  
                                                      
  
  
  
Family History  
No Family History Records Found  
  
                                        Description         Last Updated  
   
                                        Maternal history of epilepsy and recurre  
nt seizures 2021  
   
                                        Fraternal history of psychiatric disorde  
rs 2021  
   
                                        Maternal history of hypertension   
021  
   
                                        Maternal history of oncologic disorder 0  
2021  
   
                                        Maternal history of psychiatric disorder  
s 2021  
   
                                        Paternal history of hypertension   
021  
   
                                        Paternal history of oncologic disorder 0  
2021  
  
  
  
                                        Description         Last Updated  
   
                                        Maternal history of epilepsy and recurre  
nt seizures 2021  
  
  
  
                                                    Last Documented On   
1 4:06PM ; [facility]  
  
  
  
                                        Fraternal history of psychiatric disorde  
rs 2021  
   
                                        Maternal history of hypertension   
021  
   
                                        Maternal history of oncologic disorder 0  
2021  
   
                                        Maternal history of psychiatric disorder  
s 2021  
   
                                        Paternal history of hypertension   
021  
   
                                        Paternal history of oncologic disorder 0  
2021  
  
  
  
                                        Description         Last Updated  
   
                                        Maternal history of epilepsy and recurre  
nt seizures 2021  
  
  
  
                                                    Last Documented On   
1 4:06PM ; [facility]  
  
  
  
                                        Fraternal history of psychiatric disorde  
rs 2021  
   
                                        Maternal history of hypertension   
021  
   
                                        Maternal history of oncologic disorder 0  
2021  
   
                                        Maternal history of psychiatric disorder  
s 2021  
   
                                        Paternal history of hypertension   
021  
   
                                        Paternal history of oncologic disorder 0  
2021  
  
  
  
                                        Description         Last Updated  
   
                                        Maternal history of epilepsy and recurre  
nt seizures 2021  
  
  
  
                                                    Last Documented On  4:06PM ; [facility]  
  
  
  
                                        Fraternal history of psychiatric disorde  
rs 2021  
   
                                        Maternal history of hypertension   
021  
   
                                        Maternal history of oncologic disorder 0  
2021  
   
                                        Maternal history of psychiatric disorder  
s 2021  
   
                                        Paternal history of hypertension   
021  
   
                                        Paternal history of oncologic disorder 0  
2021  
  
  
  
                                        Description         Last Updated  
   
                                        Maternal history of epilepsy and recurre  
nt seizures 2021  
  
  
  
                                                    Last Documented On   
1 4:06PM ; [facility]  
  
  
  
                                        Fraternal history of psychiatric disorde  
rs 2021  
   
                                        Maternal history of hypertension   
021  
   
                                        Maternal history of oncologic disorder 0  
2021  
   
                                        Maternal history of psychiatric disorder  
s 2021  
   
                                        Paternal history of hypertension   
021  
   
                                        Paternal history of oncologic disorder 0  
2021  
  
  
  
                                        Description         Last Updated  
   
                                        Maternal history of epilepsy and recurre  
nt seizures 2021  
  
  
  
                                                    Last Documented On   
1 4:06PM ; [facility]  
  
  
  
                                        Fraternal history of psychiatric disorde  
rs 2021  
   
                                        Maternal history of hypertension   
021  
   
                                        Maternal history of oncologic disorder 0  
2021  
   
                                        Maternal history of psychiatric disorder  
s 2021  
   
                                        Paternal history of hypertension   
021  
   
                                        Paternal history of oncologic disorder 0  
2021  
  
  
  
                                        Description         Last Updated  
   
                                        Maternal history of epilepsy and recurre  
nt seizures 2021  
  
  
  
                                                    Last Documented On   
1 4:06PM ; [facility]  
  
  
  
                                        Fraternal history of psychiatric disorde  
rs 2021  
   
                                        Maternal history of hypertension   
021  
   
                                        Maternal history of oncologic disorder 0  
2021  
   
                                        Maternal history of psychiatric disorder  
s 2021  
   
                                        Paternal history of hypertension   
021  
   
                                        Paternal history of oncologic disorder 0  
2021  
  
  
  
                                        Description         Last Updated  
   
                                        Maternal history of epilepsy and recurre  
nt seizures 2021  
  
  
  
                                                    Last Documented On   
1 4:06PM ; [facility]  
  
  
  
                                        Fraternal history of psychiatric disorde  
rs 2021  
   
                                        Maternal history of hypertension   
021  
   
                                        Maternal history of oncologic disorder 0  
2021  
   
                                        Maternal history of psychiatric disorder  
s 2021  
   
                                        Paternal history of hypertension   
021  
   
                                        Paternal history of oncologic disorder 0  
2021  
  
  
  
                                        Description         Last Updated  
   
                                        Maternal history of epilepsy and recurre  
nt seizures 2021  
  
  
  
                                                    Last Documented On   
1 4:06PM ; [facility]  
  
  
  
                                        Fraternal history of psychiatric disorde  
rs 2021  
   
                                        Maternal history of hypertension   
021  
   
                                        Maternal history of oncologic disorder 0  
2021  
   
                                        Maternal history of psychiatric disorder  
s 2021  
   
                                        Paternal history of hypertension   
021  
   
                                        Paternal history of oncologic disorder 0  
2021  
  
  
  
                                        Description         Last Updated  
   
                                        Maternal history of epilepsy and recurre  
nt seizures 2021  
  
  
  
                                                    Last Documented On   
1 4:06PM ; [facility]  
  
  
  
                                        Fraternal history of psychiatric disorde  
rs 2021  
   
                                        Maternal history of hypertension   
021  
   
                                        Maternal history of oncologic disorder 0  
2021  
   
                                        Maternal history of psychiatric disorder  
s 2021  
   
                                        Paternal history of hypertension   
021  
   
                                        Paternal history of oncologic disorder 0  
2021  
  
  
  
                                        Description         Last Updated  
   
                                        Maternal history of epilepsy and recurre  
nt seizures 2021  
  
  
  
                                                    Last Documented On   
1 4:06PM ; [facility]  
  
  
  
                                        Fraternal history of psychiatric disorde  
rs 2021  
   
                                        Maternal history of hypertension   
021  
   
                                        Maternal history of oncologic disorder 0  
2021  
   
                                        Maternal history of psychiatric disorder  
s 2021  
   
                                        Paternal history of hypertension   
021  
   
                                        Paternal history of oncologic disorder 0  
2021  
  
  
  
                                        Description         Last Updated  
   
                                        Maternal history of epilepsy and recurre  
nt seizures 2021  
  
  
  
                                                    Last Documented On   
1 4:06PM ; [facility]  
  
  
  
                                        Fraternal history of psychiatric disorde  
rs 2021  
   
                                        Maternal history of hypertension   
021  
   
                                        Maternal history of oncologic disorder 0  
2021  
   
                                        Maternal history of psychiatric disorder  
s 2021  
   
                                        Paternal history of hypertension   
021  
   
                                        Paternal history of oncologic disorder 0  
2021  
  
  
  
                                        Description         Last Updated  
   
                                        Maternal history of epilepsy and recurre  
nt seizures 2021  
  
  
  
                                                    Last Documented On   
1 4:06PM ; [facility]  
  
  
  
                                        Fraternal history of psychiatric disorde  
rs 2021  
   
                                        Maternal history of hypertension   
021  
   
                                        Maternal history of oncologic disorder 0  
2021  
   
                                        Maternal history of psychiatric disorder  
s 2021  
   
                                        Paternal history of hypertension   
021  
   
                                        Paternal history of oncologic disorder 0  
2021  
  
  
  
                          Relationship Condition    Age at Onset Recorded Date/T  
elisabeth  
   
                          mother       Anxiety      Unknown        
   
                                       Depression   Unknown        
   
                                       Bipolar affective disorder Unknown        
   
                                       Epilepsy     Unknown        
   
                          father       Hypertension Unknown        
   
                          father       Anxiety      Unknown        
   
                          brother      Anxiety      Unknown        
   
                          brother      Depression   Unknown        
  
  
  
                                        Description         Last Updated  
   
                                        Maternal history of epilepsy and recurre  
nt seizures 2021  
  
  
  
                                                    Last Documented On   
1 4:06PM ; [facility]  
  
  
  
                                        Fraternal history of psychiatric disorde  
rs 2021  
   
                                        Maternal history of hypertension   
021  
   
                                        Maternal history of oncologic disorder 0  
2021  
   
                                        Maternal history of psychiatric disorder  
s 2021  
   
                                        Paternal history of hypertension   
021  
   
                                        Paternal history of oncologic disorder 0  
2021  
  
  
  
Advance Directives  
No Advanced Directives Records FoundDocuments on File  
  
                          Type         Date Recorded Patient Representative Expl  
anation  
   
                          ACP-Advance Directive                             
   
                          ACP-Power of                              
  
                                Documents on File  
  
                          Type         Date Recorded Patient Representative Expl  
anation  
   
                          ACP-Advance Directive                             
   
                          ACP-Power of                              
  
  
  
                                Advance Directive Response        Recorded Date/  
Time  
   
                                Advance Directives No              2020 3:30pm  
  
  
  
Physical Exam  
  
  
Physical Exam not supported for this document type  
  
No Physical Exam Recorded  
  
Physical Exam not supported for this document type  
  
No Physical Exam Recorded  
  
Physical Exam not supported for this document type  
  
No Physical Exam Recorded  
  
Physical Exam not supported for this document type  
  
No Physical Exam Recorded  
  
Physical Exam not supported for this document type  
  
No Physical Exam Recorded  
  
Physical Exam not supported for this document type  
  
No Physical Exam Recorded  
  
Physical Exam not supported for this document type  
  
No Physical Exam Recorded  
  
Physical Exam not supported for this document type  
  
No Physical Exam Recorded  
  
Physical Exam not supported for this document type  
  
No Physical Exam Recorded  
  
Physical Exam not supported for this document type  
  
No Physical Exam Recorded  
  
Physical Exam not supported for this document type  
  
No Physical Exam Recorded  
  
Physical Exam not supported for this document type  
  
No Physical Exam Recorded  
  
Physical Exam not supported for this document type  
  
No Physical Exam Recorded  
  
Physical Exam not supported for this document type  
  
No Physical Exam Recorded  
  
Physical Exam not supported for this document type  
  
No Physical Exam Recorded  
  
Physical Exam not supported for this document type  
  
No Physical Exam Recorded  
  
Physical Exam not supported for this document type  
  
No Physical Exam Recorded  
  
Physical Exam not supported for this document type  
  
No Physical Exam Recorded  
  
Physical Exam not supported for this document type  
  
No Physical Exam Recorded  
  
Physical Exam not supported for this document type  
  
No Physical Exam Recorded  
  
Physical Exam not supported for this document type  
  
No Physical Exam Recorded  
  
Physical Exam not supported for this document type  
  
No Physical Exam Recorded  
  
Physical Exam not supported for this document type  
  
No Physical Exam Recorded  
  
Physical Exam not supported for this document type  
  
No Physical Exam Recorded  
  
Physical Exam not supported for this document type  
  
No Physical Exam Recorded  
  
Physical Exam not supported for this document type  
  
No Physical Exam Recorded  
  
Physical Exam not supported for this document type  
  
No Physical Exam Recorded  
  
Physical Exam not supported for this document type  
  
No Physical Exam Recorded  
  
Physical Exam not supported for this document type  
  
No Physical Exam Recorded  
  
Physical Exam not supported for this document type  
  
No Physical Exam Recorded  
  
Reason for Referral  
  
  
                          Status       Reason       Specialty    Diagnoses /   
Procedures                              Referred By   
Contact                                 Referred To   
Contact  
   
                          Pending Review                             
  
  
Diagnoses  
  
  
Tachycardia  
  
  
  
Procedures  
  
  
Holter Monitor 48   
Hour                                      
  
  
Doreen Cabrera,   
APRN - CNP  
  
  
[address] [place]   
[address]  
  
  
Phone:   
[phone]  
  
  
Fax: [phone]                         
  
  
  
  
  
Chief Complaint and Reason for Visit  
  
  
                                        Chief Complaint     dysfunctional uterin  
e bleeding  
  
  
  
Additional Source Comments  
  
  
  
                                                    INFORMATION SOURCE (unrecogn  
ized section and content)  
   
                                          
  
  
  
                                        DATE CREATED        AUTHOR  
   
                                2018                      [facility]  
  
  
  
                                DATE CREATED    AUTHOR          AUTHOR'S ORGANIZ  
ATION  
   
                                2021                      [facility]  
  
  
  
                                DATE CREATED    AUTHOR          AUTHOR'S ORGANIZ  
ATION  
   
                                2021                      [facility]  
  
  
  
                                DATE CREATED    AUTHOR          AUTHOR'S ORGANIZ  
ATION  
   
                                2021                      [facility]  
  
  
  
                                DATE CREATED    AUTHOR          AUTHOR'S ORGANIZ  
ATION  
   
                                10/05/2021                      [facility]  
  
  
  
                                DATE CREATED    AUTHOR          AUTHOR'S ORGANIZ  
ATION  
   
                                10/25/2022                      [facility]  
  
  
  
                                DATE CREATED    AUTHOR          AUTHOR'S ORGANIZ  
ATION  
   
                                05/10/2024                      [facility]  
  
  
  
                                DATE CREATED    AUTHOR          AUTHOR'S ORGANIZ  
ATION  
   
                                2024                      [facility]  
[facility]  
  
  
  
                                DATE CREATED    AUTHOR          AUTHOR'S ORGANIZ  
ATION  
   
                                2024                      [facility]  
[facility]  
  
  
  
  
  
                                                    Reason for Visit (unrecogniz  
ed section and content)  
   
                                          
  
  
  
                                        Reason              Comments  
   
                                        Suicidal            with a plan of overd  
osing on zoloft  
  
  
  
                                        Reason              Comments  
   
                                        Mental Health Problem patient states she  
 is feeling homicidal and wants meds   
adjusted, doesn't feel they are working  
   
                                        Panic Attack          
  
  
  
                                        Reason              Comments  
   
                                        Homicidal           pt states she is on   
a  donw slope  of her bipolar, pt states  I want   
to   
kill anyone who pisses me off   
  
  
  
                          Status       Reason       Specialty    Diagnoses /   
Procedures                              Referred By   
Contact                                 Referred To   
Contact  
   
                          Pending Review                             
  
  
Diagnoses  
  
  
Tachycardia  
  
  
  
Procedures  
  
  
Holter Monitor 48   
Hour                                      
  
  
Doreen Cabrera,   
APRN - [facility]  
  
  
[address] [place]   
[address]  
  
  
Phone:   
[phone]  
  
  
Fax: [phone]                         
  
  
  
  
  
                                    Scheduled  
  
                                                    Active and Recently Administ  
ered Medications (unrecognized section and content)  
   
                                          
  
  
  
                          Medication Order 2021  
   
                                                      
  
  
ALPRAZolam (XANAX) tablet 0.5 mg   
(COMPLETED)  
0.5 mg, Oral, ONCE, On Sat   
21 at 2100, For 1 dose  
                                                    2109 (Given - Provider: Dottie Barcenas RN)  
                                          
  
                                       PRN  
  
                          Medication Order 2021  
   
                                                      
  
  
hydrOXYzine (VISTARIL) capsule   
50 mg  
50 mg, Oral, 3 TIMES DAILY PRN,   
Itching, Starting on 21   
at 2148  
                                                            2212 (Given - Provid  
er: Kareem Montiel RN)  
  
  
  
  
  
  
                                                    Care Teams (unrecognized sec  
tion and content)  
   
                                          
  
  
  
                      Team Member Relationship Specialty  Start Date End Date  
   
                                                      
  
  
Doreen Cabrera, APRN - [facility]  
  
  
[address]  
  
  
[place] [id number] [phone] (Work)  
  
  
[phone] (Fax) PCP - General   Family Medicine 21           
  
  
  
                                                    Team Status: Active   
   
                          Member       Role         Status       Dates  
   
                          Eric Cardoso DO Attending Provider Active       Sta  
rt: 2024  
  
  
  
  
                                                    Team Status: Inactive   
   
                          Member       Role         Status       Dates  
   
                          Braxton Patterson DO Attending Provider Active       Start  
: 2024  
End: 2024  
  
  
  
  
  
                                                    Goals (unrecognized section   
and content)  
   
                                                    Goals may be documented in a  
n alternate section  
  
FOR RECORDS PERTAINING TO PATIENTS WHO ARE OR HAVE BEEN ENROLLED IN A CHEMICAL 
DEPENDENCY/SUBSTANCEABUSE PROGRAM, SOME INFORMATION MAY BE OMITTED. This 
clinical summary was aggregated from multiple sources. Caution should be 
exercised in using it in the provision of clinical care. This summary normalizes
information from multiple sources, and as a consequence, information in this 
document may materially change the coding, format and clinical context of 
patient data. In addition, data may be omitted in some cases. CLINICAL DECISIONS
SHOULD BE BASED ON THE PRIMARY CLINICAL RECORDS. [facility] provides 
no warranty or guarantee of the accuracy or completeness of information in this 
document.

## 2025-02-01 NOTE — ED.GENADUL1
HPI
HPI - General Adult
General
Chief complaint: GI Bleed
Stated complaint: blood in stool
Time Seen by Provider: 02/01/25 04:58
Source: patient
Mode of arrival: walk-in
Limitations: no limitations
History of Present Illness
HPI narrative: 
24-year-old female presents to the emergency department for a chief complaint of rectal bleeding.  This started about 24 hours ago and has happened 4 times.  She states that when she sits on the toilet and tries to have a bowel movement blood comes 
out and she is concerned she has a hemorrhoid.  She feels something there.  No fever or vomiting.
Related Data
Home Medications

?Medication ?Instructions ?Recorded ?Confirmed
aripiprazole 5 mg tablet 5 mg PO QPM 10/17/24 02/01/25
atorvastatin 20 mg tablet 20 mg PO .QHS 10/17/24 02/01/25
blood sugar diagnostic (OneTouch  10/17/24 10/17/24
Ultra Test strips)   
blood-glucose meter (OneTouch  10/17/24 10/17/24
Ultra2 Meter)   
buspirone 10 mg tablet 10 mg PO BID 10/17/24 02/01/25
docusate sodium 100 mg capsule 100 mg PO .QHS PRN constipation 10/17/24 02/01/25
ferrous sulfate 325 mg (65 mg 325 mg PO DAILY 10/17/24 02/01/25
iron) tablet (FeroSul)   
guanfacine 1 mg tablet,extended 1 mg PO QPM 10/17/24 02/01/25
release 24 hr   
lamotrigine 100 mg tablet 100 mg PO DAILY 10/17/24 02/01/25
lisinopril 5 mg tablet 5 mg PO DAILY 10/17/24 02/01/25
prazosin 1 mg capsule 2 mg PO QPM 10/17/24 02/01/25
quetiapine 50 mg tablet 50 mg PO .QHS 10/17/24 02/01/25
sertraline 50 mg tablet 50 mg PO DAILY 10/17/24 02/01/25
sitagliptin phosphate 50 mg tablet 50 mg PO DAILY 10/17/24 02/01/25
(Januvia)   

Previous Rx's

?Medication ?Instructions ?Recorded
promethazine 25 mg tablet 25 mg PO Q6H PRN nausea and 10/19/24
 vomiting #12 tabs 
ibuprofen 800 mg tablet 800 mg PO Q8H PRN pain 14 days #40 12/18/24
 tabs 
hydrocortisone-pramoxine 1 %-1 % 1 applic WA TID #30 grams 02/01/25
rectal cream  


Allergies

Allergy/AdvReac Type Severity Reaction Status Date / Time
amoxicillin (From Augmentin) Allergy Severe Anaphylaxis Verified 02/01/25 04:51
clavulanic acid (From Allergy Severe Anaphylaxis Verified 02/01/25 04:51
Augmentin)     
Penicillins Allergy Severe Anaphylaxis Verified 02/01/25 04:51
bee venom protein (honey bee) Allergy  Anaphylaxis Verified 02/01/25 04:51
metformin Allergy  Nausea Verified 02/01/25 04:51
nickel Allergy  Hives Verified 02/01/25 04:51



Opioid HPI
Opioid Management
Most Recent Opioid Data: 
      Last Pain Scale 6 02/01/25 04:58 02/01/25
      Last ED Pain Assessment 02/01/25 04:58  
      Last ORT Total Score 11 10/17/24 17:06 10/17/24
      Last ORT Risk Category High Risk 10/17/24 17:06 10/17/24


Review of Systems
ROS  
 Narrative A ten point review of systems is negative except as noted above.   

[facility]
Medical History (Updated 02/01/25 @ 05:06 by Jonathon Ramirez MD)

Pelvic pain
 ?R10.2 - Pelvic and perineal pain (ICD-10)
Menorrhagia
 ?N92.0 - Excessive and frequent menstruation with regular cycle (ICD-10)
Abnormal uterine bleeding
 ?N93.9 - Abnormal uterine and vaginal bleeding, unspecified (ICD-10)
Postoperative nausea and vomiting (10/25/24)
 ?R11.2 - Nausea with vomiting, unspecified (ICD-10)
 ?Z98.890 - Other specified postprocedural states (ICD-10)
History of blood transfusion (10/17/18)
 ?Z92.89 - Personal history of other medical treatment (ICD-10)
Night terror
 ?F51.4 - Sleep terrors [night terrors] (ICD-10)
Sleep paralysis
 ?G47.8 - Other sleep disorders (ICD-10)
Insomnia
 ?G47.00 - Insomnia, unspecified (ICD-10)
Panic attacks
 ?F41.0 - Panic disorder [episodic paroxysmal anxiety] (ICD-10)
Snores
 ?R06.83 - Snoring (ICD-10)
Syncope
 ?R55 - Syncope and collapse (ICD-10)
Metabolic syndrome
 ?E88.810 - Metabolic syndrome (ICD-10)
Dysfunctional uterine bleeding
 ?N93.8 - Other specified abnormal uterine and vaginal bleeding (ICD-10)
Anemia requiring transfusions
 ?D64.9 - Anemia, unspecified (ICD-10)
PTSD (post-traumatic stress disorder)
 ?F43.10 - Post-traumatic stress disorder, unspecified (ICD-10)
Bipolar 1 disorder
 ?F31.9 - Bipolar disorder, unspecified (ICD-10)
Anxiety
 ?F41.9 - Anxiety disorder, unspecified (ICD-10)
Depression
 ?F32.A - Depression, unspecified (ICD-10)
PCOS (polycystic ovarian syndrome)
 ?E28.2 - Polycystic ovarian syndrome (ICD-10)
Insulin resistance
 ?E88.819 - Insulin resistance, unspecified (ICD-10)
Mastocytosis
 ?D47.09 - Other mast cell neoplasms of uncertain behavior (ICD-10)
POTS (postural orthostatic tachycardia syndrome)
 ?G90.A - Postural orthostatic tachycardia syndrome [POTS] (ICD-10)


Surgical History (Updated 12/10/24 @ 11:18 by Gay Duke NP)

H/O dilation and curettage (10/25/24)
 ?Z98.890 - Other specified postprocedural states (ICD-10)
History of myringotomy
 ?Z98.890 - Other specified postprocedural states (ICD-10)
H/O esophagogastroduodenoscopy
 ?Z98.890 - Other specified postprocedural states (ICD-10)
Hx of tonsillectomy
 ?Z90.89 - Acquired absence of other organs (ICD-10)


Family History (Updated 10/22/24 @ 11:25 by Gay Duke NP)
Grandmother
 Family history of hypertension
 Family history of CHF (congestive heart failure)
 Family history of COPD (chronic obstructive pulmonary disease)
Aunt
 Family history of cancer
 Family history of diabetes mellitus
Grandfather
 Family history of cancer
 Family history of diabetes mellitus
 Family history of hypertension
Uncle
 Family history of cancer
 Family history of diabetes mellitus
Father
 Family history of hypertension
Other
 Family history of DVT
 Family history of seizures



Social History (Updated 12/10/24 @ 11:27 by Gay Duke NP)
Within the past year, how often did you have a drink containing alcohol:  never 
Score interpretation:  A score less than 3 is consistent with normal alcohol consumption. 
Do you use any of these nicotine containing products:  vaping products 
Non-prescribed substance use:  denies use and former substance user 
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 
Previous occupational history:  self employed 
Highest level of school completed/degree received:  some college, no degree 
Are you now , , , , never  or living with a partner:  living with partner 
Little interest or pleasure in doing things:  not at all 
Feeling down, depressed, or hopeless:  not at all 
Feel stressed/tense/nervous/anxious/difficulty sleeping:  only a little 
Gender Identity:  female 



Exam
Narrative
Exam Narrative: 
Nurses note and vital signs reviewed and patient is not hypoxic.

General:  The patient appears well and in no apparent distress.  Patient is resting comfortably on cart.
Skin:  Warm, dry, no pallor noted.  There is no rash noted.
Head:  Normocephalic, atraumatic
Eye: Normal conjunctiva, no drainage
Ears, Nose, Mouth, and Throat: oral mucosa is moist. Nares patent. 
Cardiovascular:  Regular Rate and Rhythm
Respiratory:  Patient is in no distress, no accessory muscle use, lungs are clear to auscultation, no wheezing, rales or rhonchi
Back:  non-tender
GI: Obese and nontender.  Perianal examination shows a nonthrombosed external hemorrhoid, no active bleeding
Musculoskeletal: The patient has no evidence of calf tenderness, no pitting edema, symmetrical pulses noted bilaterally
Neurological:  A&O, normal speech
Psychiatric:  Cooperative
Constitutional
Vital Signs, click to edit/add: 

Last Vital Signs

Temp  98.7 F   02/01/25 04:44
Pulse  120 H  02/01/25 04:44
Resp  16   02/01/25 04:44
BP  109/77   02/01/25 04:44
Pulse Ox  97   02/01/25 04:44
O2 Del Method  Room Air  02/01/25 04:44




Course
Vital Signs
Vital signs: 

Vital Signs

Temperature  98.7 F   02/01/25 04:44
Pulse Rate  120 H  02/01/25 04:44
Respiratory Rate  16   02/01/25 04:44
Blood Pressure  109/77   02/01/25 04:44
Pulse Oximetry  97   02/01/25 04:44
Oxygen Delivery Method  Room Air  02/01/25 04:44



Temperature  98.7 F   02/01/25 04:44
Pulse Rate  120 H  02/01/25 04:44
Respiratory Rate  16   02/01/25 04:44
Blood Pressure  109/77   02/01/25 04:44
Pulse Oximetry  97   02/01/25 04:44
Oxygen Delivery Method  Room Air  02/01/25 04:44




Medical Decision Making
MDM Narrative
Medical decision making narrative: 
My clinical impression is that she has an external hemorrhoid.  She is prescribed ProctoCream and referred to general surgery for follow-up.  Treatment diagnosis and follow-up were discussed with the patient.
Differential Diagnosis
Differential Diagnosis: Internal hemorrhoid, external hemorrhoid

Discharge Plan
Discharge
Chief Complaint: GI Bleed

Clinical Impression:
 External hemorrhoid


Patient Disposition: Home, Self-Care

Time of Disposition Decision: 05:06

Condition: Good

Mode of Transportation: Private Vehicle

Prescriptions / Home Meds:
New
  hydrocortisone-pramoxine 1-1 % cream 
   1 applic WA TID Qty: 30 0RF

No Action
  promethazine 25 mg tablet 
   25 mg PO Q6H PRN (Reason: nausea and vomiting) Qty: 12 0RF
  ibuprofen 800 mg tablet 
   800 mg PO Q8H PRN (Reason: pain) 14 Days Qty: 40 0RF
  atorvastatin 20 mg tablet 
   20 mg PO .QHS 
  (DME) blood-glucose meter [OneTouch Ultra2 Meter]  Misc 
     MISCELLANEOUS  
  (DME) OneTouch Ultra Test  Strip 
     MISCELLANEOUS  
  buspirone 10 mg tablet 
   10 mg PO BID 
  docusate sodium 100 mg capsule 
   100 mg PO .QHS PRN (Reason: constipation) 
  ferrous sulfate [FeroSul] 325 mg (65 mg iron) tablet 
   325 mg PO DAILY 
  guanfacine 1 mg tablet extended release 24 hr 
   1 mg PO QPM 
  lamotrigine 100 mg tablet 
   100 mg PO DAILY 
  lisinopril 5 mg tablet 
   5 mg PO DAILY 
  sertraline 50 mg tablet 
   50 mg PO DAILY 
  Januvia 50 mg tablet 
   50 mg PO DAILY 
  aripiprazole 5 mg tablet 
   5 mg PO QPM 
  prazosin 1 mg capsule 
   2 mg PO QPM 
  quetiapine 50 mg tablet 
   50 mg PO .QHS 

Print Language: English

Instructions:  Hemorrhoids (ED)

Referrals:
Zachery Pinzon MD [Physician] - 1 week
Jacquie Bacon NP [Primary Care Provider] - 1 week

## 2025-02-01 NOTE — ED_ITS
HPI    
HPI - General Adult    
General    
Chief complaint: GI Bleed    
Stated complaint: blood in stool    
Time Seen by Provider: 02/01/25 04:58    
Source: patient    
Mode of arrival: walk-in    
Limitations: no limitations    
History of Present Illness    
HPI narrative:     
24-year-old female presents to the emergency department for a chief complaint of  
rectal bleeding.  This started about 24 hours ago and has happened 4 times.  She  
states that when she sits on the toilet and tries to have a bowel movement blood  
comes out and she is concerned she has a hemorrhoid.  She feels something there.  
 No fever or vomiting.    
Related Data    
                                Home Medications    
    
    
    
?Medication ?Instructions ?Recorded ?Confirmed    
     
aripiprazole 5 mg tablet 5 mg PO QPM 10/17/24 02/01/25    
     
atorvastatin 20 mg tablet 20 mg PO .QHS 10/17/24 02/01/25    
     
blood sugar diagnostic (OneTouch  10/17/24 10/17/24    
    
Ultra Test strips)       
     
blood-glucose meter (OneTouch  10/17/24 10/17/24    
    
Ultra2 Meter)       
     
buspirone 10 mg tablet 10 mg PO BID 10/17/24 02/01/25    
     
docusate sodium 100 mg capsule 100 mg PO .QHS PRN constipation 10/17/24 02/01/25    
     
ferrous sulfate 325 mg (65 mg 325 mg PO DAILY 10/17/24 02/01/25    
    
iron) tablet (FeroSul)       
     
guanfacine 1 mg tablet,extended 1 mg PO QPM 10/17/24 02/01/25    
    
release 24 hr       
     
lamotrigine 100 mg tablet 100 mg PO DAILY 10/17/24 02/01/25    
     
lisinopril 5 mg tablet 5 mg PO DAILY 10/17/24 02/01/25    
     
prazosin 1 mg capsule 2 mg PO QPM 10/17/24 02/01/25    
     
quetiapine 50 mg tablet 50 mg PO .QHS 10/17/24 02/01/25    
     
sertraline 50 mg tablet 50 mg PO DAILY 10/17/24 02/01/25    
     
sitagliptin phosphate 50 mg tablet 50 mg PO DAILY 10/17/24 02/01/25    
    
(Januvia)       
    
    
                                  Previous Rx's    
    
    
    
?Medication ?Instructions ?Recorded    
     
promethazine 25 mg tablet 25 mg PO Q6H PRN nausea and 10/19/24    
    
 vomiting #12 tabs     
     
ibuprofen 800 mg tablet 800 mg PO Q8H PRN pain 14 days #40 12/18/24    
    
 tabs     
     
hydrocortisone-pramoxine 1 %-1 % 1 applic [place] TID #30 grams 02/01/25    
    
rectal cream      
    
    
    
                                    Allergies    
    
    
    
Allergy/AdvReac Type Severity Reaction Status Date / Time    
     
amoxicillin (From Augmentin) Allergy Severe Anaphylaxis Verified 02/01/25 04:51    
     
clavulanic acid (From Allergy Severe Anaphylaxis Verified 02/01/25 04:51    
    
Augmentin)         
     
Penicillins Allergy Severe Anaphylaxis Verified 02/01/25 04:51    
     
bee venom protein (honey bee) Allergy  Anaphylaxis Verified 02/01/25 04:51    
     
metformin Allergy  Nausea Verified 02/01/25 04:51    
     
nickel Allergy  Hives Verified 02/01/25 04:51    
    
    
    
    
Opioid HPI    
Opioid Management    
Most Recent Opioid Data:     
    
    
                Last Pain Scale 6               02/01/25 04:58  02/01/25    
     
                          Last ED Pain Assessment   02/01/25 04:58    
     
                Last ORT Total Score 11              10/17/24 17:06  10/17/24    
     
                Last ORT Risk Category High Risk       10/17/24 17:06  10/17/24    
    
    
    
Review of Systems    
    
    
ROS      
    
 Narrative A ten point review of systems is negative except as noted above.       
    
    
[facility]    
Medical History (Updated 02/01/25 @ 05:06 by Jonathon Ramirez MD)    
    
Pelvic pain    
   ?R10.2 - Pelvic and perineal pain (ICD-10)    
Menorrhagia    
   ?N92.0 - Excessive and frequent menstruation with regular cycle (ICD-10)    
Abnormal uterine bleeding    
   ?N93.9 - Abnormal uterine and vaginal bleeding, unspecified (ICD-10)    
Postoperative nausea and vomiting (10/25/24)    
   ?R11.2 - Nausea with vomiting, unspecified (ICD-10)    
   ?Z98.890 - Other specified postprocedural states (ICD-10)    
History of blood transfusion (10/17/18)    
   ?Z92.89 - Personal history of other medical treatment (ICD-10)    
Night terror    
   ?F51.4 - Sleep terrors [night terrors] (ICD-10)    
Sleep paralysis    
   ?G47.8 - Other sleep disorders (ICD-10)    
Insomnia    
   ?G47.00 - Insomnia, unspecified (ICD-10)    
Panic attacks    
   ?F41.0 - Panic disorder [episodic paroxysmal anxiety] (ICD-10)    
Snores    
   ?R06.83 - Snoring (ICD-10)    
Syncope    
   ?R55 - Syncope and collapse (ICD-10)    
Metabolic syndrome    
   ?E88.810 - Metabolic syndrome (ICD-10)    
Dysfunctional uterine bleeding    
   ?N93.8 - Other specified abnormal uterine and vaginal bleeding (ICD-10)    
Anemia requiring transfusions    
   ?D64.9 - Anemia, unspecified (ICD-10)    
PTSD (post-traumatic stress disorder)    
   ?F43.10 - Post-traumatic stress disorder, unspecified (ICD-10)    
Bipolar 1 disorder    
   ?F31.9 - Bipolar disorder, unspecified (ICD-10)    
Anxiety    
   ?F41.9 - Anxiety disorder, unspecified (ICD-10)    
Depression    
   ?F32.A - Depression, unspecified (ICD-10)    
PCOS (polycystic ovarian syndrome)    
   ?E28.2 - Polycystic ovarian syndrome (ICD-10)    
Insulin resistance    
   ?E88.819 - Insulin resistance, unspecified (ICD-10)    
Mastocytosis    
   ?D47.09 - Other mast cell neoplasms of uncertain behavior (ICD-10)    
POTS (postural orthostatic tachycardia syndrome)    
   ?G90.A - Postural orthostatic tachycardia syndrome [POTS] (ICD-10)    
    
    
Surgical History (Updated 12/10/24 @ 11:18 by Gay Duke NP)    
    
H/O dilation and curettage (10/25/24)    
   ?Z98.890 - Other specified postprocedural states (ICD-10)    
History of myringotomy    
   ?Z98.890 - Other specified postprocedural states (ICD-10)    
H/O esophagogastroduodenoscopy    
   ?Z98.890 - Other specified postprocedural states (ICD-10)    
Hx of tonsillectomy    
   ?Z90.89 - Acquired absence of other organs (ICD-10)    
    
    
Family History (Updated 10/22/24 @ 11:25 by Gay Duke NP)    
Grandmother   Family history of hypertension    
   Family history of CHF (congestive heart failure)    
   Family history of COPD (chronic obstructive pulmonary disease)    
Aunt   Family history of cancer    
   Family history of diabetes mellitus    
Grandfather   Family history of cancer    
   Family history of diabetes mellitus    
   Family history of hypertension    
Uncle   Family history of cancer    
   Family history of diabetes mellitus    
Father   Family history of hypertension    
Other   Family history of DVT    
   Family history of seizures    
    
    
    
Social History (Updated 12/10/24 @ 11:27 by Gay Duke NP)    
Within the past year, how often did you have a drink containing alcohol:  never     
Score interpretation:  A score less than 3 is consistent with normal alcohol   
consumption.     
Do you use any of these nicotine containing products:  vaping products     
Non-prescribed substance use:  denies use and former substance user     
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     
Previous occupational history:  self employed     
Highest level of school completed/degree received:  some college, no degree     
Are you now , , , , never  or living with   
a partner:  living with partner     
Little interest or pleasure in doing things:  not at all     
Feeling down, depressed, or hopeless:  not at all     
Feel stressed/tense/nervous/anxious/difficulty sleeping:  only a little     
Gender Identity:  female     
    
    
    
Exam    
Narrative    
Exam Narrative:     
Nurses note and vital signs reviewed and patient is not hypoxic.    
    
General:  The patient appears well and in no apparent distress.  Patient is   
resting comfortably on cart.    
Skin:  Warm, dry, no pallor noted.  There is no rash noted.    
Head:  Normocephalic, atraumatic    
Eye: Normal conjunctiva, no drainage    
Ears, Nose, Mouth, and Throat: oral mucosa is moist. Nares patent.     
Cardiovascular:  Regular Rate and Rhythm    
Respiratory:  Patient is in no distress, no accessory muscle use, lungs are   
clear to auscultation, no wheezing, rales or rhonchi    
Back:  non-tender    
GI: Obese and nontender.  Perianal examination shows a nonthrombosed external   
hemorrhoid, no active bleeding    
Musculoskeletal: The patient has no evidence of calf tenderness, no pitting   
edema, symmetrical pulses noted bilaterally    
Neurological:  A&O, normal speech    
Psychiatric:  Cooperative    
Constitutional    
Vital Signs, click to edit/add:     
    
                                Last Vital Signs    
    
    
    
Temp  98.7 F   02/01/25 04:44    
     
Pulse  120 H  02/01/25 04:44    
     
Resp  16   02/01/25 04:44    
     
BP  109/77   02/01/25 04:44    
     
Pulse Ox  97   02/01/25 04:44    
     
O2 Del Method  Room Air  02/01/25 04:44    
    
    
    
    
    
Course    
Vital Signs    
Vital signs:     
    
                                   Vital Signs    
    
    
    
Temperature  98.7 F   02/01/25 04:44    
     
Pulse Rate  120 H  02/01/25 04:44    
     
Respiratory Rate  16   02/01/25 04:44    
     
Blood Pressure  109/77   02/01/25 04:44    
     
Pulse Oximetry  97   02/01/25 04:44    
     
Oxygen Delivery Method  Room Air  02/01/25 04:44    
    
    
                                            
    
    
    
Temperature  98.7 F   02/01/25 04:44    
     
Pulse Rate  120 H  02/01/25 04:44    
     
Respiratory Rate  16   02/01/25 04:44    
     
Blood Pressure  109/77   02/01/25 04:44    
     
Pulse Oximetry  97   02/01/25 04:44    
     
Oxygen Delivery Method  Room Air  02/01/25 04:44    
    
    
    
    
    
Medical Decision Making    
MDM Narrative    
Medical decision making narrative:     
My clinical impression is that she has an external hemorrhoid.  She is   
prescribed ProctoCream and referred to general surgery for follow-up.  Treatment  
 diagnosis and follow-up were discussed with the patient.    
Differential Diagnosis    
Differential Diagnosis: Internal hemorrhoid, external hemorrhoid    
    
Discharge Plan    
Discharge    
Chief Complaint: GI Bleed    
    
Clinical Impression:    
 External hemorrhoid    
    
    
Patient Disposition: Home, Self-Care    
    
Time of Disposition Decision: 05:06    
    
Condition: Good    
    
Mode of Transportation: Private Vehicle    
    
Prescriptions / Home Meds:    
New    
  hydrocortisone-pramoxine 1-1 % cream     
   1 applic TX TID Qty: 30 0RF    
    
No Action    
  promethazine 25 mg tablet     
   25 mg PO Q6H PRN (Reason: nausea and vomiting) Qty: 12 0RF    
  ibuprofen 800 mg tablet     
   800 mg PO Q8H PRN (Reason: pain) 14 Days Qty: 40 0RF    
  atorvastatin 20 mg tablet     
   20 mg PO .QHS     
  (DME) blood-glucose meter [OneTouch Ultra2 Meter]  Misc     
     MISCELLANEOUS      
  (DME) OneTouch Ultra Test  Strip     
     MISCELLANEOUS      
  buspirone 10 mg tablet     
   10 mg PO BID     
  docusate sodium 100 mg capsule     
   100 mg PO .QHS PRN (Reason: constipation)     
  ferrous sulfate [FeroSul] 325 mg (65 mg iron) tablet     
   325 mg PO DAILY     
  guanfacine 1 mg tablet extended release 24 hr     
   1 mg PO QPM     
  lamotrigine 100 mg tablet     
   100 mg PO DAILY     
  lisinopril 5 mg tablet     
   5 mg PO DAILY     
  sertraline 50 mg tablet     
   50 mg PO DAILY     
  Januvia 50 mg tablet     
   50 mg PO DAILY     
  aripiprazole 5 mg tablet     
   5 mg PO QPM     
  prazosin 1 mg capsule     
   2 mg PO QPM     
  quetiapine 50 mg tablet     
   50 mg PO .QHS     
    
Print Language: English    
    
Instructions:  Hemorrhoids (ED)    
    
Referrals:    
Zachery Pinzon MD [Physician] - 1 week    
Jacquie Bacon NP [Primary Care Provider] - 1 week